# Patient Record
Sex: FEMALE | Race: BLACK OR AFRICAN AMERICAN | NOT HISPANIC OR LATINO
[De-identification: names, ages, dates, MRNs, and addresses within clinical notes are randomized per-mention and may not be internally consistent; named-entity substitution may affect disease eponyms.]

---

## 2015-06-03 RX ORDER — ATORVASTATIN CALCIUM 80 MG/1
1 TABLET, FILM COATED ORAL
Qty: 0 | Refills: 0 | COMMUNITY
Start: 2015-06-03

## 2015-11-05 RX ORDER — METFORMIN HYDROCHLORIDE 850 MG/1
0 TABLET ORAL
Qty: 0 | Refills: 0 | COMMUNITY
Start: 2015-11-05

## 2015-11-05 RX ORDER — ISOSORBIDE MONONITRATE 60 MG/1
1 TABLET, EXTENDED RELEASE ORAL
Qty: 0 | Refills: 0 | COMMUNITY
Start: 2015-11-05

## 2017-03-08 ENCOUNTER — APPOINTMENT (OUTPATIENT)
Dept: ORTHOPEDIC SURGERY | Facility: CLINIC | Age: 62
End: 2017-03-08

## 2017-04-13 ENCOUNTER — APPOINTMENT (OUTPATIENT)
Dept: CARDIOLOGY | Facility: CLINIC | Age: 62
End: 2017-04-13

## 2017-04-29 ENCOUNTER — APPOINTMENT (OUTPATIENT)
Dept: ORTHOPEDIC SURGERY | Facility: CLINIC | Age: 62
End: 2017-04-29

## 2017-04-29 VITALS
WEIGHT: 220 LBS | SYSTOLIC BLOOD PRESSURE: 151 MMHG | DIASTOLIC BLOOD PRESSURE: 78 MMHG | HEIGHT: 65 IN | BODY MASS INDEX: 36.65 KG/M2 | HEART RATE: 70 BPM

## 2017-04-29 DIAGNOSIS — M47.816 SPONDYLOSIS W/OUT MYELOPATHY OR RADICULOPATHY, LUMBAR REGION: ICD-10-CM

## 2017-04-29 DIAGNOSIS — I63.9 CEREBRAL INFARCTION, UNSPECIFIED: ICD-10-CM

## 2017-06-09 ENCOUNTER — APPOINTMENT (OUTPATIENT)
Dept: ORTHOPEDIC SURGERY | Facility: CLINIC | Age: 62
End: 2017-06-09

## 2017-06-23 ENCOUNTER — OTHER (OUTPATIENT)
Age: 62
End: 2017-06-23

## 2017-06-30 ENCOUNTER — APPOINTMENT (OUTPATIENT)
Dept: ORTHOPEDIC SURGERY | Facility: CLINIC | Age: 62
End: 2017-06-30

## 2017-06-30 VITALS
HEIGHT: 65 IN | WEIGHT: 220 LBS | BODY MASS INDEX: 36.65 KG/M2 | DIASTOLIC BLOOD PRESSURE: 93 MMHG | SYSTOLIC BLOOD PRESSURE: 183 MMHG | HEART RATE: 81 BPM

## 2017-06-30 DIAGNOSIS — M48.06 SPINAL STENOSIS, LUMBAR REGION: ICD-10-CM

## 2017-06-30 RX ORDER — ISOPROPYL ALCOHOL 0.7 ML/1
70 SWAB TOPICAL
Qty: 100 | Refills: 0 | Status: ACTIVE | COMMUNITY
Start: 2016-09-30

## 2017-07-23 ENCOUNTER — EMERGENCY (EMERGENCY)
Facility: HOSPITAL | Age: 62
LOS: 1 days | Discharge: HOME | End: 2017-07-23
Attending: EMERGENCY MEDICINE
Payer: COMMERCIAL

## 2017-07-23 VITALS
RESPIRATION RATE: 20 BRPM | OXYGEN SATURATION: 98 % | SYSTOLIC BLOOD PRESSURE: 180 MMHG | WEIGHT: 233.03 LBS | TEMPERATURE: 99 F | HEART RATE: 82 BPM | DIASTOLIC BLOOD PRESSURE: 95 MMHG

## 2017-07-23 PROCEDURE — 96372 THER/PROPH/DIAG INJ SC/IM: CPT

## 2017-07-23 PROCEDURE — 99284 EMERGENCY DEPT VISIT MOD MDM: CPT | Mod: 25

## 2017-07-23 PROCEDURE — 99284 EMERGENCY DEPT VISIT MOD MDM: CPT

## 2017-07-23 RX ORDER — HYDROMORPHONE HYDROCHLORIDE 2 MG/ML
1 INJECTION INTRAMUSCULAR; INTRAVENOUS; SUBCUTANEOUS ONCE
Qty: 0 | Refills: 0 | Status: DISCONTINUED | OUTPATIENT
Start: 2017-07-23 | End: 2017-07-23

## 2017-07-23 RX ADMIN — HYDROMORPHONE HYDROCHLORIDE 1 MILLIGRAM(S): 2 INJECTION INTRAMUSCULAR; INTRAVENOUS; SUBCUTANEOUS at 20:39

## 2017-07-23 NOTE — ED STATDOCS - ATTENDING CONTRIBUTION TO CARE
I, Alejandro Perera, performed the initial face to face bedside interview with this patient regarding history of present illness, review of symptoms and relevant past medical, social and family history.  I completed an independent physical examination.  I was the initial provider who evaluated this patient. I have signed out the follow up of any pending tests (i.e. labs, radiological studies) to the ACP.  I have communicated the patient’s plan of care and disposition with the ACP.

## 2017-07-23 NOTE — ED ADULT NURSE NOTE - OBJECTIVE STATEMENT
Pt axox3 c/o running out of her percocet and states she needs a referral from her pmd to go see her main mgt md. Pt presenting with / upper/lower back pain with radiation to her left leg. Pt ambulates with personal cane which is in her possession in the ED.

## 2017-07-23 NOTE — ED ADULT TRIAGE NOTE - CHIEF COMPLAINT QUOTE
lt side body pain x 2 years from neuropathy and pinched nerve. PMD aware and pt states he is not doing anything.

## 2017-07-23 NOTE — ED STATDOCS - PROGRESS NOTE DETAILS
PA NOTE: Pt seen by intake physician and HPI/ROS/PE/MDM reviewed. Discussed plan and any resulted studies at this time. Pt is feeling much better after pain medications. Pt admits to hx of chronic back pain/sciatica; for which she sees Dr. Colon. She ran out of pain medication and is in the ER today for pain control. She denies new falls, leg weakness, fever, chills, abd pain, urinary symptoms, paresthesias, saddle anesthesia, nausea, vomiting, sob, cp, hp, bb incontinence. MS: pt with left paraspinal lumbar pain and pain to buttock with palpation, negative straight leg raise, no midline tenderness, no step offs, from/strength/sensation of lower extremities; including dorsi flexion and planter flexion against resistance. Gait intact with cane. DP intact. Skin: no rash Plan: Pt feeling better after medications here. F/u with pain management. FS WNL.

## 2017-07-23 NOTE — ED STATDOCS - OBJECTIVE STATEMENT
63 yo F with hx of DM presents to ED c/o worsening left sided back pain radiating to left leg. Pt follows pain mgmt Dr. Colon for chronic back pain. No relief with oxycodone, Lyrica 100mg bid, and Tylenol. Pt ambulates with a cane. Increased pain with walking. Pt also complains "my toes feel like they are going to burst". Denies falls and trauma. BSL was 79 this morning.

## 2017-07-23 NOTE — ED ADULT NURSE NOTE - PMH
Diabetes    Glaucoma    Herniated disc, cervical    History of CEA (carotid endarterectomy)  Right  History of peripheral vascular disease    History of retinal detachment    Hyperlipidemia    Hypertension    Legally blind    PAD (peripheral artery disease)

## 2017-08-11 ENCOUNTER — APPOINTMENT (OUTPATIENT)
Dept: ORTHOPEDIC SURGERY | Facility: CLINIC | Age: 62
End: 2017-08-11

## 2017-08-29 ENCOUNTER — APPOINTMENT (OUTPATIENT)
Dept: CARDIOLOGY | Facility: CLINIC | Age: 62
End: 2017-08-29
Payer: MEDICARE

## 2017-08-29 VITALS — DIASTOLIC BLOOD PRESSURE: 86 MMHG | SYSTOLIC BLOOD PRESSURE: 162 MMHG

## 2017-08-29 VITALS
DIASTOLIC BLOOD PRESSURE: 82 MMHG | BODY MASS INDEX: 39.65 KG/M2 | HEIGHT: 65 IN | SYSTOLIC BLOOD PRESSURE: 155 MMHG | OXYGEN SATURATION: 96 % | WEIGHT: 238 LBS | HEART RATE: 84 BPM

## 2017-08-29 DIAGNOSIS — E11.40 TYPE 2 DIABETES MELLITUS WITH DIABETIC NEUROPATHY, UNSPECIFIED: ICD-10-CM

## 2017-08-29 DIAGNOSIS — M79.605 PAIN IN LEFT LEG: ICD-10-CM

## 2017-08-29 PROCEDURE — 93000 ELECTROCARDIOGRAM COMPLETE: CPT

## 2017-08-29 PROCEDURE — 99215 OFFICE O/P EST HI 40 MIN: CPT

## 2017-09-18 ENCOUNTER — FORM ENCOUNTER (OUTPATIENT)
Age: 62
End: 2017-09-18

## 2017-09-19 ENCOUNTER — OUTPATIENT (OUTPATIENT)
Dept: OUTPATIENT SERVICES | Facility: HOSPITAL | Age: 62
LOS: 1 days | End: 2017-09-19
Payer: COMMERCIAL

## 2017-09-19 DIAGNOSIS — I25.10 ATHEROSCLEROTIC HEART DISEASE OF NATIVE CORONARY ARTERY WITHOUT ANGINA PECTORIS: ICD-10-CM

## 2017-09-19 PROCEDURE — 93306 TTE W/DOPPLER COMPLETE: CPT | Mod: 26

## 2017-09-19 PROCEDURE — 93306 TTE W/DOPPLER COMPLETE: CPT

## 2017-10-18 ENCOUNTER — APPOINTMENT (OUTPATIENT)
Dept: MRI IMAGING | Facility: CLINIC | Age: 62
End: 2017-10-18
Payer: MEDICARE

## 2017-10-18 ENCOUNTER — OUTPATIENT (OUTPATIENT)
Dept: OUTPATIENT SERVICES | Facility: HOSPITAL | Age: 62
LOS: 1 days | End: 2017-10-18
Payer: COMMERCIAL

## 2017-10-18 DIAGNOSIS — M79.605 PAIN IN LEFT LEG: ICD-10-CM

## 2017-10-18 PROCEDURE — C8902: CPT

## 2017-10-18 PROCEDURE — C8914: CPT

## 2017-10-18 PROCEDURE — 82565 ASSAY OF CREATININE: CPT

## 2017-10-18 PROCEDURE — 74185 MRA ABD W OR W/O CNTRST: CPT | Mod: 26

## 2017-10-18 PROCEDURE — 73725 MR ANG LWR EXT W OR W/O DYE: CPT | Mod: 26,50

## 2017-10-18 PROCEDURE — A9585: CPT

## 2017-12-14 ENCOUNTER — APPOINTMENT (OUTPATIENT)
Dept: CARDIOLOGY | Facility: CLINIC | Age: 62
End: 2017-12-14

## 2018-08-20 ENCOUNTER — APPOINTMENT (OUTPATIENT)
Dept: INTERNAL MEDICINE | Facility: CLINIC | Age: 63
End: 2018-08-20

## 2018-11-20 ENCOUNTER — APPOINTMENT (OUTPATIENT)
Dept: CARDIOLOGY | Facility: CLINIC | Age: 63
End: 2018-11-20
Payer: MEDICARE

## 2018-11-20 ENCOUNTER — APPOINTMENT (OUTPATIENT)
Dept: CARDIOLOGY | Facility: CLINIC | Age: 63
End: 2018-11-20

## 2018-11-20 VITALS
RESPIRATION RATE: 16 BRPM | HEIGHT: 65 IN | WEIGHT: 232 LBS | OXYGEN SATURATION: 94 % | HEART RATE: 82 BPM | SYSTOLIC BLOOD PRESSURE: 160 MMHG | DIASTOLIC BLOOD PRESSURE: 91 MMHG | BODY MASS INDEX: 38.65 KG/M2

## 2018-11-20 VITALS — DIASTOLIC BLOOD PRESSURE: 70 MMHG | SYSTOLIC BLOOD PRESSURE: 130 MMHG

## 2018-11-20 DIAGNOSIS — R07.89 OTHER CHEST PAIN: ICD-10-CM

## 2018-11-20 PROCEDURE — 99214 OFFICE O/P EST MOD 30 MIN: CPT

## 2018-11-20 PROCEDURE — 93000 ELECTROCARDIOGRAM COMPLETE: CPT

## 2018-11-20 RX ORDER — PEN NEEDLE, DIABETIC 29 G X1/2"
31G X 8 MM NEEDLE, DISPOSABLE MISCELLANEOUS
Refills: 0 | Status: ACTIVE | COMMUNITY
Start: 2016-09-30

## 2018-11-20 RX ORDER — DIAZEPAM 5 MG/1
5 TABLET ORAL
Qty: 2 | Refills: 0 | Status: DISCONTINUED | COMMUNITY
Start: 2017-02-10 | End: 2018-11-20

## 2018-11-20 RX ORDER — GABAPENTIN 100 MG/1
100 CAPSULE ORAL TWICE DAILY
Refills: 0 | Status: ACTIVE | COMMUNITY
Start: 2018-11-20

## 2018-11-20 RX ORDER — PREGABALIN 100 MG/1
100 CAPSULE ORAL
Qty: 60 | Refills: 0 | Status: DISCONTINUED | COMMUNITY
Start: 2016-11-23 | End: 2018-11-20

## 2018-11-20 RX ORDER — TRIAMCINOLONE ACETONIDE 1 MG/ML
0.1 LOTION TOPICAL
Qty: 60 | Refills: 0 | Status: DISCONTINUED | COMMUNITY
Start: 2017-01-17 | End: 2018-11-20

## 2018-11-20 RX ORDER — INSULIN ASPART 100 [IU]/ML
(70-30) 100 INJECTION, SUSPENSION SUBCUTANEOUS
Refills: 0 | Status: ACTIVE | COMMUNITY
Start: 2017-01-23

## 2018-11-20 RX ORDER — BLOOD SUGAR DIAGNOSTIC
STRIP MISCELLANEOUS
Refills: 0 | Status: ACTIVE | COMMUNITY
Start: 2016-09-30

## 2018-11-25 ENCOUNTER — INPATIENT (INPATIENT)
Facility: HOSPITAL | Age: 63
LOS: 30 days | Discharge: HOME | DRG: 286 | End: 2018-12-26
Attending: HOSPITALIST | Admitting: HOSPITALIST
Payer: COMMERCIAL

## 2018-11-25 VITALS
TEMPERATURE: 98 F | RESPIRATION RATE: 22 BRPM | HEART RATE: 76 BPM | SYSTOLIC BLOOD PRESSURE: 174 MMHG | DIASTOLIC BLOOD PRESSURE: 108 MMHG | HEIGHT: 65 IN | WEIGHT: 214.95 LBS

## 2018-11-25 DIAGNOSIS — I50.33 ACUTE ON CHRONIC DIASTOLIC (CONGESTIVE) HEART FAILURE: ICD-10-CM

## 2018-11-25 DIAGNOSIS — E87.5 HYPERKALEMIA: ICD-10-CM

## 2018-11-25 DIAGNOSIS — I25.10 ATHEROSCLEROTIC HEART DISEASE OF NATIVE CORONARY ARTERY WITHOUT ANGINA PECTORIS: ICD-10-CM

## 2018-11-25 LAB
ALBUMIN SERPL ELPH-MCNC: 3.5 G/DL — SIGNIFICANT CHANGE UP (ref 3.3–5.2)
ALP SERPL-CCNC: 276 U/L — HIGH (ref 40–120)
ALT FLD-CCNC: 49 U/L — HIGH
ANION GAP SERPL CALC-SCNC: 6 MMOL/L — SIGNIFICANT CHANGE UP (ref 5–17)
APTT BLD: 28.9 SEC — SIGNIFICANT CHANGE UP (ref 27.5–36.3)
AST SERPL-CCNC: 41 U/L — HIGH
BILIRUB SERPL-MCNC: 0.9 MG/DL — SIGNIFICANT CHANGE UP (ref 0.4–2)
BUN SERPL-MCNC: 31 MG/DL — HIGH (ref 8–20)
CALCIUM SERPL-MCNC: 8.6 MG/DL — SIGNIFICANT CHANGE UP (ref 8.6–10.2)
CHLORIDE SERPL-SCNC: 96 MMOL/L — LOW (ref 98–107)
CO2 SERPL-SCNC: 30 MMOL/L — HIGH (ref 22–29)
CREAT SERPL-MCNC: 0.98 MG/DL — SIGNIFICANT CHANGE UP (ref 0.5–1.3)
GLUCOSE SERPL-MCNC: 173 MG/DL — HIGH (ref 70–115)
HCT VFR BLD CALC: 31.3 % — LOW (ref 37–47)
HGB BLD-MCNC: 10.7 G/DL — LOW (ref 12–16)
INR BLD: 1.22 RATIO — HIGH (ref 0.88–1.16)
MAGNESIUM SERPL-MCNC: 2.3 MG/DL — SIGNIFICANT CHANGE UP (ref 1.6–2.6)
MCHC RBC-ENTMCNC: 28.2 PG — SIGNIFICANT CHANGE UP (ref 27–31)
MCHC RBC-ENTMCNC: 34.2 G/DL — SIGNIFICANT CHANGE UP (ref 32–36)
MCV RBC AUTO: 82.6 FL — SIGNIFICANT CHANGE UP (ref 81–99)
NT-PROBNP SERPL-SCNC: 3460 PG/ML — HIGH (ref 0–300)
PLATELET # BLD AUTO: 237 K/UL — SIGNIFICANT CHANGE UP (ref 150–400)
POTASSIUM SERPL-MCNC: 5.7 MMOL/L — HIGH (ref 3.5–5.3)
POTASSIUM SERPL-SCNC: 5.7 MMOL/L — HIGH (ref 3.5–5.3)
PROT SERPL-MCNC: 7.8 G/DL — SIGNIFICANT CHANGE UP (ref 6.6–8.7)
PROTHROM AB SERPL-ACNC: 14.1 SEC — HIGH (ref 10–12.9)
RBC # BLD: 3.79 M/UL — LOW (ref 4.4–5.2)
RBC # FLD: 20.4 % — HIGH (ref 11–15.6)
SODIUM SERPL-SCNC: 132 MMOL/L — LOW (ref 135–145)
TROPONIN T SERPL-MCNC: 0.04 NG/ML — SIGNIFICANT CHANGE UP (ref 0–0.06)
WBC # BLD: 7.4 K/UL — SIGNIFICANT CHANGE UP (ref 4.8–10.8)
WBC # FLD AUTO: 7.4 K/UL — SIGNIFICANT CHANGE UP (ref 4.8–10.8)

## 2018-11-25 PROCEDURE — 99285 EMERGENCY DEPT VISIT HI MDM: CPT

## 2018-11-25 PROCEDURE — 99223 1ST HOSP IP/OBS HIGH 75: CPT

## 2018-11-25 PROCEDURE — 71046 X-RAY EXAM CHEST 2 VIEWS: CPT | Mod: 26

## 2018-11-25 PROCEDURE — 74176 CT ABD & PELVIS W/O CONTRAST: CPT | Mod: 26

## 2018-11-25 PROCEDURE — 93010 ELECTROCARDIOGRAM REPORT: CPT

## 2018-11-25 RX ORDER — NITROGLYCERIN 6.5 MG
1 CAPSULE, EXTENDED RELEASE ORAL DAILY
Qty: 0 | Refills: 0 | Status: DISCONTINUED | OUTPATIENT
Start: 2018-11-25 | End: 2018-12-10

## 2018-11-25 RX ORDER — HYDROCHLOROTHIAZIDE 25 MG
12.5 TABLET ORAL ONCE
Qty: 0 | Refills: 0 | Status: COMPLETED | OUTPATIENT
Start: 2018-11-25 | End: 2018-11-25

## 2018-11-25 RX ORDER — ISOSORBIDE MONONITRATE 60 MG/1
60 TABLET, EXTENDED RELEASE ORAL ONCE
Qty: 0 | Refills: 0 | Status: COMPLETED | OUTPATIENT
Start: 2018-11-25 | End: 2018-11-25

## 2018-11-25 RX ORDER — FUROSEMIDE 40 MG
40 TABLET ORAL ONCE
Qty: 0 | Refills: 0 | Status: COMPLETED | OUTPATIENT
Start: 2018-11-25 | End: 2018-11-25

## 2018-11-25 RX ORDER — KETOROLAC TROMETHAMINE 30 MG/ML
15 SYRINGE (ML) INJECTION ONCE
Qty: 0 | Refills: 0 | Status: DISCONTINUED | OUTPATIENT
Start: 2018-11-25 | End: 2018-11-25

## 2018-11-25 RX ORDER — CARVEDILOL PHOSPHATE 80 MG/1
25 CAPSULE, EXTENDED RELEASE ORAL ONCE
Qty: 0 | Refills: 0 | Status: COMPLETED | OUTPATIENT
Start: 2018-11-25 | End: 2018-11-25

## 2018-11-25 RX ADMIN — CARVEDILOL PHOSPHATE 25 MILLIGRAM(S): 80 CAPSULE, EXTENDED RELEASE ORAL at 22:07

## 2018-11-25 RX ADMIN — Medication 15 MILLIGRAM(S): at 22:51

## 2018-11-25 RX ADMIN — ISOSORBIDE MONONITRATE 60 MILLIGRAM(S): 60 TABLET, EXTENDED RELEASE ORAL at 22:07

## 2018-11-25 RX ADMIN — Medication 40 MILLIGRAM(S): at 19:32

## 2018-11-25 RX ADMIN — Medication 12.5 MILLIGRAM(S): at 22:07

## 2018-11-25 RX ADMIN — Medication 1 PATCH: at 19:33

## 2018-11-25 RX ADMIN — Medication 15 MILLIGRAM(S): at 21:04

## 2018-11-25 NOTE — ED PROVIDER NOTE - PSH
After cataract    Atherosclerosis of right carotid artery     delivery delivered    Detached retina    S/P CABG x 1    Uveitic glaucoma of both eyes

## 2018-11-25 NOTE — ED PROVIDER NOTE - PHYSICAL EXAMINATION
VITAL SIGNS: I have reviewed nursing notes and confirm.  CONSTITUTIONAL: Well-developed; well-nourished; in mild respiratory  distress.  SKIN: Skin exam is warm and dry, no acute rash.  HEAD: Normocephalic; atraumatic.  EYES: PERRL, EOM intact; conjunctiva and sclera clear.  ENT: No nasal discharge; airway clear. Throat clear.  NECK: Supple; non tender.  No lymphadenopathy.  CARD: S1, S2 normal; no murmurs, gallops, or rubs. Regular rate and rhythm.  RESP: No wheezes,  (+) b/l crakle of base R>L  ABD: Normal bowel sounds;(+) distended  non-tender; no hepatosplenomegaly.  EXT: Normal ROM. No clubbing, cyanosis or edema.  NEURO: Alert, oriented. Grossly unremarkable. No focal deficits. no facial droop, moves all extremities,    PSYCH: Cooperative, appropriate.

## 2018-11-25 NOTE — ED PROVIDER NOTE - OBJECTIVE STATEMENT
62 yo F hx of CAD s/p CABG, DM, HDL, vascular insufficiency, HTN p/w exertional dyspnea and abdominal swelling, worse over the past 2 weeks, Patient has been complaints with her medication. No fever, no cough, no chest pain. She denies paracentisis of the abdomen.     cards: Dr. Gramajo ([facility])

## 2018-11-25 NOTE — ED ADULT TRIAGE NOTE - CHIEF COMPLAINT QUOTE
pt BIBA with reports of SOB x 2 weeks, states "I am retaining water." pt with no chest pain, AOX3, no obvious resp distress.

## 2018-11-25 NOTE — ED ADULT NURSE NOTE - NSIMPLEMENTINTERV_GEN_ALL_ED
Implemented All Fall Risk Interventions:  [place] to call system. Call bell, personal items and telephone within reach. Instruct patient to call for assistance. Room bathroom lighting operational. Non-slip footwear when patient is off stretcher. Physically safe environment: no spills, clutter or unnecessary equipment. Stretcher in lowest position, wheels locked, appropriate side rails in place. Provide visual cue, wrist band, yellow gown, etc. Monitor gait and stability. Monitor for mental status changes and reorient to person, place, and time. Review medications for side effects contributing to fall risk. Reinforce activity limits and safety measures with patient and family.

## 2018-11-25 NOTE — CONSULT NOTE ADULT - ATTENDING COMMENTS
Unclear etiology for CHF. ? of diabetic, diastolic dysfunction.     Aggressive diuresis needed. 40 mg IV bid is appropriate.     Plan for TTE and nuclear stress test.

## 2018-11-25 NOTE — ED ADULT NURSE NOTE - OBJECTIVE STATEMENT
pt c.o shortness of breath and left flank pain x2 weeks. pt saw primary physician, had blood work but nothing else was done. patient is a&ox3, no apparent respiratory distress, denies chest pain. pt's abdomen is swollen and nontender with the exception of her left flank.  pt reports decreased urination. normal sinus on monitor, hypertensive in 200s, administered nitro patch as ordered.

## 2018-11-25 NOTE — H&P ADULT - ASSESSMENT
64 y/o female with PMHx of CAD s/p CABG x1 Stent placement x2,  PAD s/p balloon angioplasty, DM2, HTN, HLD came to the ED complaining of worsening dyspnea with minimal exertion and increasing abdominal girth over the past 2 weeks. She usually ambulate with walker since R foot heel & 3 toes amputation last December without difficulty but lately has been having difficulty ambulating due to shortness of breath. She also reported generalized pain in her abdomen radiating to the left flank due to swelling in her abdomen. She has no chest pain, palpitation, orthopnea, cough, fever, chills, change in bowel/urinary habit, nausea, vomiting, recent travel, calf pain. Patient endorsed compliance to medication and diet.     Shortness of breath likely due to CHF exacerbation   Admit to telemetry   BNP: 3460   Troponin: 0.04   Lasix 40mg once given in the ED; will continue Lasix 40mg BID  Cardiology on board; recommendation noted   Will continue home medications     Hyperkalemia   K: 5.7 no hemolysis   Kayexalate 15mg once   Monitor BMP     CAD s/p CABG and stent   Continue Plavix 75mg and Aspirin 325mg     PAD s/p balloon angioplasty  Continue Plavix 75mg and Aspirin 325mg     HTN/HLD  Continue home medications   HbA1C and lipid profile in AM     DM-2  On metformin 850mg BID will hold   Insulin sliding scale     Supportive  DVT prophylaxis: Lovenox 40mg daily  GI prophylaxis: PPI 40mg home dose  Diet: DASH/TLC

## 2018-11-25 NOTE — ED PROVIDER NOTE - NS ED ROS FT
Review of Systems  •	CONSTITUTIONAL - no  fever, no diaphoresis, no weight change  •	SKIN - no rash  •	HEMATOLOGIC - no bleeding, no bruising  •	EYES - no eye pain, no blurred vision  •	ENT - no change in hearing, no pain  •	RESPIRATORY -  (+)  shortness of breath, no cough  •	CARDIAC - no chest pain, no palpitations  •	GI -(+) abd pain, no nausea, no vomiting, no diarrhea, no constipation, no bleeding  •	GENITO-URINARY - no discharge, no dysuria; no hematuria,   •	ENDO - no polydypsia, no polyurea, no heat/no cold intolerance  •	MUSCULOSKELETAL - no joint pain, no swelling, no redness  •	NEUROLOGIC - no weakness, no headache, no anesthesia, no paresthesias  •	PSYCH - no anxiety, non suicidal, non homicidal, no hallucination, no depression

## 2018-11-25 NOTE — H&P ADULT - FAMILY HISTORY
Father  Still living? No  Family history of essential hypertension, Age at diagnosis: Age Unknown  Family history of diabetes mellitus, Age at diagnosis: Age Unknown     Mother  Still living? No  Family history of essential hypertension, Age at diagnosis: Age Unknown  Family history of diabetes mellitus, Age at diagnosis: Age Unknown

## 2018-11-25 NOTE — H&P ADULT - HISTORY OF PRESENT ILLNESS
62 y/o female with PMHx of CAD s/p CABG x1 Stent placement x2,  PAD s/p balloon angioplasty, DM2, HTN, HLD came to the ED complaining of worsening dyspnea with minimal exertion and increasing abdominal girth over the past 2 weeks. She usually ambulate with walker since R foot heel & 3 toes amputation last December without difficulty but lately has been having difficulty ambulating due to shortness of breath. She also reported generalized pain in her abdomen radiating to the left flank due to swelling in her abdomen. She has no chest pain, palpitation, orthopnea, cough, fever, chills, change in bowel/urinary habit, nausea, vomiting, recent travel, calf pain. Patient endorsed compliance to medication and diet.

## 2018-11-26 DIAGNOSIS — I50.9 HEART FAILURE, UNSPECIFIED: ICD-10-CM

## 2018-11-26 LAB
ANION GAP SERPL CALC-SCNC: 6 MMOL/L — SIGNIFICANT CHANGE UP (ref 5–17)
BUN SERPL-MCNC: 32 MG/DL — HIGH (ref 8–20)
CALCIUM SERPL-MCNC: 8.6 MG/DL — SIGNIFICANT CHANGE UP (ref 8.6–10.2)
CHLORIDE SERPL-SCNC: 100 MMOL/L — SIGNIFICANT CHANGE UP (ref 98–107)
CHOLEST SERPL-MCNC: 116 MG/DL — SIGNIFICANT CHANGE UP (ref 110–199)
CO2 SERPL-SCNC: 31 MMOL/L — HIGH (ref 22–29)
CREAT SERPL-MCNC: 0.98 MG/DL — SIGNIFICANT CHANGE UP (ref 0.5–1.3)
GLUCOSE BLDC GLUCOMTR-MCNC: 111 MG/DL — HIGH (ref 70–99)
GLUCOSE BLDC GLUCOMTR-MCNC: 116 MG/DL — HIGH (ref 70–99)
GLUCOSE BLDC GLUCOMTR-MCNC: 226 MG/DL — HIGH (ref 70–99)
GLUCOSE BLDC GLUCOMTR-MCNC: 243 MG/DL — HIGH (ref 70–99)
GLUCOSE SERPL-MCNC: 110 MG/DL — SIGNIFICANT CHANGE UP (ref 70–115)
HBA1C BLD-MCNC: 7.3 % — HIGH (ref 4–5.6)
HDLC SERPL-MCNC: 38 MG/DL — LOW
LIPID PNL WITH DIRECT LDL SERPL: 65 MG/DL — SIGNIFICANT CHANGE UP
MAGNESIUM SERPL-MCNC: 2.1 MG/DL — SIGNIFICANT CHANGE UP (ref 1.6–2.6)
PHOSPHATE SERPL-MCNC: 4.3 MG/DL — SIGNIFICANT CHANGE UP (ref 2.4–4.7)
POTASSIUM SERPL-MCNC: 5.3 MMOL/L — SIGNIFICANT CHANGE UP (ref 3.5–5.3)
POTASSIUM SERPL-SCNC: 5.3 MMOL/L — SIGNIFICANT CHANGE UP (ref 3.5–5.3)
SODIUM SERPL-SCNC: 137 MMOL/L — SIGNIFICANT CHANGE UP (ref 135–145)
TOTAL CHOLESTEROL/HDL RATIO MEASUREMENT: 3 RATIO — LOW (ref 3.3–7.1)
TRIGL SERPL-MCNC: 65 MG/DL — SIGNIFICANT CHANGE UP (ref 10–200)
TROPONIN T SERPL-MCNC: 0.06 NG/ML — SIGNIFICANT CHANGE UP (ref 0–0.06)

## 2018-11-26 PROCEDURE — 99233 SBSQ HOSP IP/OBS HIGH 50: CPT

## 2018-11-26 PROCEDURE — 99223 1ST HOSP IP/OBS HIGH 75: CPT

## 2018-11-26 RX ORDER — ASPIRIN/CALCIUM CARB/MAGNESIUM 324 MG
325 TABLET ORAL DAILY
Qty: 0 | Refills: 0 | Status: DISCONTINUED | OUTPATIENT
Start: 2018-11-26 | End: 2018-11-30

## 2018-11-26 RX ORDER — DEXTROSE 50 % IN WATER 50 %
25 SYRINGE (ML) INTRAVENOUS ONCE
Qty: 0 | Refills: 0 | Status: DISCONTINUED | OUTPATIENT
Start: 2018-11-26 | End: 2018-12-26

## 2018-11-26 RX ORDER — KETOROLAC TROMETHAMINE 30 MG/ML
15 SYRINGE (ML) INJECTION ONCE
Qty: 0 | Refills: 0 | Status: DISCONTINUED | OUTPATIENT
Start: 2018-11-26 | End: 2018-11-26

## 2018-11-26 RX ORDER — OXYCODONE HYDROCHLORIDE 5 MG/1
5 TABLET ORAL ONCE
Qty: 0 | Refills: 0 | Status: DISCONTINUED | OUTPATIENT
Start: 2018-11-26 | End: 2018-11-26

## 2018-11-26 RX ORDER — ATORVASTATIN CALCIUM 80 MG/1
80 TABLET, FILM COATED ORAL AT BEDTIME
Qty: 0 | Refills: 0 | Status: DISCONTINUED | OUTPATIENT
Start: 2018-11-26 | End: 2018-12-10

## 2018-11-26 RX ORDER — ISOSORBIDE MONONITRATE 60 MG/1
60 TABLET, EXTENDED RELEASE ORAL DAILY
Qty: 0 | Refills: 0 | Status: DISCONTINUED | OUTPATIENT
Start: 2018-11-26 | End: 2018-12-26

## 2018-11-26 RX ORDER — ACETAMINOPHEN 500 MG
650 TABLET ORAL EVERY 6 HOURS
Qty: 0 | Refills: 0 | Status: DISCONTINUED | OUTPATIENT
Start: 2018-11-26 | End: 2018-11-26

## 2018-11-26 RX ORDER — SODIUM POLYSTYRENE SULFONATE 4.1 MEQ/G
15 POWDER, FOR SUSPENSION ORAL ONCE
Qty: 0 | Refills: 0 | Status: COMPLETED | OUTPATIENT
Start: 2018-11-26 | End: 2018-11-26

## 2018-11-26 RX ORDER — DEXTROSE 50 % IN WATER 50 %
12.5 SYRINGE (ML) INTRAVENOUS ONCE
Qty: 0 | Refills: 0 | Status: DISCONTINUED | OUTPATIENT
Start: 2018-11-26 | End: 2018-12-26

## 2018-11-26 RX ORDER — GABAPENTIN 400 MG/1
100 CAPSULE ORAL THREE TIMES A DAY
Qty: 0 | Refills: 0 | Status: DISCONTINUED | OUTPATIENT
Start: 2018-11-26 | End: 2018-12-26

## 2018-11-26 RX ORDER — CARVEDILOL PHOSPHATE 80 MG/1
25 CAPSULE, EXTENDED RELEASE ORAL EVERY 12 HOURS
Qty: 0 | Refills: 0 | Status: DISCONTINUED | OUTPATIENT
Start: 2018-11-26 | End: 2018-12-26

## 2018-11-26 RX ORDER — FUROSEMIDE 40 MG
40 TABLET ORAL EVERY 12 HOURS
Qty: 0 | Refills: 0 | Status: DISCONTINUED | OUTPATIENT
Start: 2018-11-26 | End: 2018-11-28

## 2018-11-26 RX ORDER — INSULIN LISPRO 100/ML
VIAL (ML) SUBCUTANEOUS
Qty: 0 | Refills: 0 | Status: DISCONTINUED | OUTPATIENT
Start: 2018-11-26 | End: 2018-12-26

## 2018-11-26 RX ORDER — SODIUM CHLORIDE 9 MG/ML
1000 INJECTION, SOLUTION INTRAVENOUS
Qty: 0 | Refills: 0 | Status: DISCONTINUED | OUTPATIENT
Start: 2018-11-26 | End: 2018-12-26

## 2018-11-26 RX ORDER — DEXTROSE 50 % IN WATER 50 %
15 SYRINGE (ML) INTRAVENOUS ONCE
Qty: 0 | Refills: 0 | Status: DISCONTINUED | OUTPATIENT
Start: 2018-11-26 | End: 2018-12-26

## 2018-11-26 RX ORDER — PANTOPRAZOLE SODIUM 20 MG/1
40 TABLET, DELAYED RELEASE ORAL
Qty: 0 | Refills: 0 | Status: DISCONTINUED | OUTPATIENT
Start: 2018-11-26 | End: 2018-12-26

## 2018-11-26 RX ORDER — DULOXETINE HYDROCHLORIDE 30 MG/1
60 CAPSULE, DELAYED RELEASE ORAL DAILY
Qty: 0 | Refills: 0 | Status: DISCONTINUED | OUTPATIENT
Start: 2018-11-26 | End: 2018-12-26

## 2018-11-26 RX ORDER — INFLUENZA VIRUS VACCINE 15; 15; 15; 15 UG/.5ML; UG/.5ML; UG/.5ML; UG/.5ML
0.5 SUSPENSION INTRAMUSCULAR ONCE
Qty: 0 | Refills: 0 | Status: COMPLETED | OUTPATIENT
Start: 2018-11-26 | End: 2018-11-27

## 2018-11-26 RX ORDER — ACETAMINOPHEN 500 MG
650 TABLET ORAL EVERY 6 HOURS
Qty: 0 | Refills: 0 | Status: DISCONTINUED | OUTPATIENT
Start: 2018-11-26 | End: 2018-12-26

## 2018-11-26 RX ORDER — ENOXAPARIN SODIUM 100 MG/ML
40 INJECTION SUBCUTANEOUS DAILY
Qty: 0 | Refills: 0 | Status: DISCONTINUED | OUTPATIENT
Start: 2018-11-26 | End: 2018-12-06

## 2018-11-26 RX ORDER — GLUCAGON INJECTION, SOLUTION 0.5 MG/.1ML
1 INJECTION, SOLUTION SUBCUTANEOUS ONCE
Qty: 0 | Refills: 0 | Status: DISCONTINUED | OUTPATIENT
Start: 2018-11-26 | End: 2018-12-26

## 2018-11-26 RX ORDER — LOSARTAN POTASSIUM 100 MG/1
100 TABLET, FILM COATED ORAL DAILY
Qty: 0 | Refills: 0 | Status: DISCONTINUED | OUTPATIENT
Start: 2018-11-26 | End: 2018-11-28

## 2018-11-26 RX ORDER — HYDROCHLOROTHIAZIDE 25 MG
12.5 TABLET ORAL DAILY
Qty: 0 | Refills: 0 | Status: DISCONTINUED | OUTPATIENT
Start: 2018-11-26 | End: 2018-11-29

## 2018-11-26 RX ORDER — DOCUSATE SODIUM 100 MG
100 CAPSULE ORAL
Qty: 0 | Refills: 0 | Status: DISCONTINUED | OUTPATIENT
Start: 2018-11-26 | End: 2018-12-26

## 2018-11-26 RX ORDER — OXYCODONE HYDROCHLORIDE 5 MG/1
5 TABLET ORAL EVERY 6 HOURS
Qty: 0 | Refills: 0 | Status: DISCONTINUED | OUTPATIENT
Start: 2018-11-26 | End: 2018-12-02

## 2018-11-26 RX ORDER — CLOPIDOGREL BISULFATE 75 MG/1
75 TABLET, FILM COATED ORAL DAILY
Qty: 0 | Refills: 0 | Status: DISCONTINUED | OUTPATIENT
Start: 2018-11-26 | End: 2018-12-26

## 2018-11-26 RX ADMIN — ENOXAPARIN SODIUM 40 MILLIGRAM(S): 100 INJECTION SUBCUTANEOUS at 11:43

## 2018-11-26 RX ADMIN — Medication 1 PATCH: at 08:00

## 2018-11-26 RX ADMIN — GABAPENTIN 100 MILLIGRAM(S): 400 CAPSULE ORAL at 21:19

## 2018-11-26 RX ADMIN — Medication 2: at 17:25

## 2018-11-26 RX ADMIN — Medication 1 PATCH: at 15:03

## 2018-11-26 RX ADMIN — CLOPIDOGREL BISULFATE 75 MILLIGRAM(S): 75 TABLET, FILM COATED ORAL at 11:43

## 2018-11-26 RX ADMIN — Medication 15 MILLIGRAM(S): at 05:47

## 2018-11-26 RX ADMIN — PANTOPRAZOLE SODIUM 40 MILLIGRAM(S): 20 TABLET, DELAYED RELEASE ORAL at 05:11

## 2018-11-26 RX ADMIN — ISOSORBIDE MONONITRATE 60 MILLIGRAM(S): 60 TABLET, EXTENDED RELEASE ORAL at 11:43

## 2018-11-26 RX ADMIN — OXYCODONE HYDROCHLORIDE 5 MILLIGRAM(S): 5 TABLET ORAL at 12:32

## 2018-11-26 RX ADMIN — Medication 325 MILLIGRAM(S): at 11:43

## 2018-11-26 RX ADMIN — GABAPENTIN 100 MILLIGRAM(S): 400 CAPSULE ORAL at 15:03

## 2018-11-26 RX ADMIN — DULOXETINE HYDROCHLORIDE 60 MILLIGRAM(S): 30 CAPSULE, DELAYED RELEASE ORAL at 11:43

## 2018-11-26 RX ADMIN — Medication 40 MILLIGRAM(S): at 05:11

## 2018-11-26 RX ADMIN — Medication 15 MILLIGRAM(S): at 05:17

## 2018-11-26 RX ADMIN — Medication 40 MILLIGRAM(S): at 18:48

## 2018-11-26 RX ADMIN — Medication 12.5 MILLIGRAM(S): at 05:11

## 2018-11-26 RX ADMIN — CARVEDILOL PHOSPHATE 25 MILLIGRAM(S): 80 CAPSULE, EXTENDED RELEASE ORAL at 18:48

## 2018-11-26 RX ADMIN — SODIUM POLYSTYRENE SULFONATE 15 GRAM(S): 4.1 POWDER, FOR SUSPENSION ORAL at 03:54

## 2018-11-26 RX ADMIN — ATORVASTATIN CALCIUM 80 MILLIGRAM(S): 80 TABLET, FILM COATED ORAL at 21:19

## 2018-11-26 RX ADMIN — Medication 1 PATCH: at 07:00

## 2018-11-26 RX ADMIN — GABAPENTIN 100 MILLIGRAM(S): 400 CAPSULE ORAL at 05:10

## 2018-11-26 RX ADMIN — LOSARTAN POTASSIUM 100 MILLIGRAM(S): 100 TABLET, FILM COATED ORAL at 05:11

## 2018-11-26 RX ADMIN — CARVEDILOL PHOSPHATE 25 MILLIGRAM(S): 80 CAPSULE, EXTENDED RELEASE ORAL at 05:11

## 2018-11-26 RX ADMIN — OXYCODONE HYDROCHLORIDE 5 MILLIGRAM(S): 5 TABLET ORAL at 13:32

## 2018-11-26 NOTE — PROGRESS NOTE ADULT - SUBJECTIVE AND OBJECTIVE BOX
JOSEFINA KUN Female 63y [id number]    Patient is a 63y old  Female who presents with a chief complaint of CHF (25 Nov 2018 23:06)      On Service note:  Pt seen/ examined at bedside and charts reviewed, no over night event reported by night staff. Pt reports she came to hospital due to fluid overload- now feeling little better, does reports some lower back pain.     Review of system:  No fever, chills, nausea, vomiting, headache, dizziness.    PHYSICAL EXAM:    Vital Signs Last 24 Hrs  T(C): 36.5 (26 Nov 2018 05:08), Max: 36.7 (25 Nov 2018 18:12)  T(F): 97.7 (26 Nov 2018 05:08), Max: 98 (25 Nov 2018 18:12)  HR: 65 (26 Nov 2018 09:38) (61 - 76)  BP: 139/65 (26 Nov 2018 09:38) (116/54 - 210/85)  BP(mean): --  RR: 18 (26 Nov 2018 05:08) (18 - 22)  SpO2: 100% (26 Nov 2018 05:08) (87% - 100%)    GENERAL: Pt lying comfortably, NAD.  CHEST/LUNG: Clear to auscultation bilaterally; No wheezing.  HEART: S1S2+, Regular rate and rhythm; No murmurs.  ABDOMEN: Soft, Nontender, Nondistended; Bowel sounds present.  Extremities: No pedal edema, right toe amputation.   NEURO: AAOX3, no focal deficits, no motor r sensory loss.  PSYCH: normal mood.          MEDICATIONS  (STANDING):  aspirin 325 milliGRAM(s) Oral daily  atorvastatin 80 milliGRAM(s) Oral at bedtime  carvedilol 25 milliGRAM(s) Oral every 12 hours  clopidogrel Tablet 75 milliGRAM(s) Oral daily  dextrose 5%. 1000 milliLiter(s) (50 mL/Hr) IV Continuous <Continuous>  dextrose 50% Injectable 12.5 Gram(s) IV Push once  dextrose 50% Injectable 25 Gram(s) IV Push once  dextrose 50% Injectable 25 Gram(s) IV Push once  docusate sodium 100 milliGRAM(s) Oral two times a day  DULoxetine 60 milliGRAM(s) Oral daily  enoxaparin Injectable 40 milliGRAM(s) SubCutaneous daily  furosemide   Injectable 40 milliGRAM(s) IV Push every 12 hours  gabapentin 100 milliGRAM(s) Oral three times a day  hydrochlorothiazide 12.5 milliGRAM(s) Oral daily  influenza   Vaccine 0.5 milliLiter(s) IntraMuscular once  insulin lispro (HumaLOG) corrective regimen sliding scale   SubCutaneous three times a day before meals  isosorbide   mononitrate ER Tablet (IMDUR) 60 milliGRAM(s) Oral daily  losartan 100 milliGRAM(s) Oral daily  nitroglycerin    Patch 0.4 mG/Hr(s) 1 patch Transdermal daily  pantoprazole    Tablet 40 milliGRAM(s) Oral before breakfast    MEDICATIONS  (PRN):  acetaminophen   Tablet .. 650 milliGRAM(s) Oral every 6 hours PRN Moderate Pain (4 - 6)  dextrose 40% Gel 15 Gram(s) Oral once PRN Blood Glucose LESS THAN 70 milliGRAM(s)/deciliter  glucagon  Injectable 1 milliGRAM(s) IntraMuscular once PRN Glucose LESS THAN 70 milligrams/deciliter        Labs:  LABS:                        10.7   7.4   )-----------( 237      ( 25 Nov 2018 19:39 )             31.3     11-26    137  |  100  |  32.0<H>  ----------------------------<  110  5.3   |  31.0<H>  |  0.98    Ca    8.6      26 Nov 2018 11:32  Phos  4.3     11-26  Mg     2.1     11-26    TPro  7.8  /  Alb  3.5  /  TBili  0.9  /  DBili  x   /  AST  41<H>  /  ALT  49<H>  /  AlkPhos  276<H>  11-25    PT/INR - ( 25 Nov 2018 19:39 )   PT: 14.1 sec;   INR: 1.22 ratio         PTT - ( 25 Nov 2018 19:39 )  PTT:28.9 sec    LIVER FUNCTIONS - ( 25 Nov 2018 19:39 )  Alb: 3.5 g/dL / Pro: 7.8 g/dL / ALK PHOS: 276 U/L / ALT: 49 U/L / AST: 41 U/L / GGT: x               CARDIAC MARKERS ( 26 Nov 2018 11:32 )  x     / 0.06 ng/mL / x     / x     / x      CARDIAC MARKERS ( 25 Nov 2018 19:39 )  x     / 0.04 ng/mL / x     / x     / x

## 2018-11-26 NOTE — PROGRESS NOTE ADULT - ASSESSMENT
Pt is 62 y/o female with PMHx of CAD s/p CABG x1, Stent placement x2,  PAD s/p balloon angioplasty, DM2, HTN, HLD came to the ED with c/o  worsening dyspnea with minimal exertion, increasing abdominal girth and limited ambulation due to SOB. Found to have elevated proBNP and congestion on CXR- seen by cardiology in ED and admitted to medicine for acute on chronic HFpEF.       Shortness of breath likely due to HFpEF:   - Clinically feeling better.   - C/w CHF pathway, I/Os, daily weight.   - C/w IV lasix and other home meds.  - Cardiology on board; recommendation noted   - TTE pending.      >Hyperkalemia- Resolved.   >H/o CAD s/p CABG and stent- Stable, no CP, C/w ASA/ Plavix/ Statins/ Coreg. Cardio on board- nuclear stress test once volume status better.  >H/o PAD- s/p balloon angioplasty, C/w ASA/ Plavix/ Lipitor.   >H/o HTN /HLD- Continue home medications   >H/o DM-2 - C/w LSS + FS monitoring. Hold home metformin.   >Chronic lower backache- Pain meds prn.  >DVT ppx- Lovenox.

## 2018-11-27 ENCOUNTER — NON-APPOINTMENT (OUTPATIENT)
Age: 63
End: 2018-11-27

## 2018-11-27 DIAGNOSIS — I25.10 ATHEROSCLEROTIC HEART DISEASE OF NATIVE CORONARY ARTERY WITHOUT ANGINA PECTORIS: ICD-10-CM

## 2018-11-27 LAB
GLUCOSE BLDC GLUCOMTR-MCNC: 199 MG/DL — HIGH (ref 70–99)
GLUCOSE BLDC GLUCOMTR-MCNC: 212 MG/DL — HIGH (ref 70–99)
GLUCOSE BLDC GLUCOMTR-MCNC: 232 MG/DL — HIGH (ref 70–99)
HCT VFR BLD CALC: 27.9 % — LOW (ref 37–47)
HGB BLD-MCNC: 9.6 G/DL — LOW (ref 12–16)
MCHC RBC-ENTMCNC: 28.5 PG — SIGNIFICANT CHANGE UP (ref 27–31)
MCHC RBC-ENTMCNC: 34.4 G/DL — SIGNIFICANT CHANGE UP (ref 32–36)
MCV RBC AUTO: 82.8 FL — SIGNIFICANT CHANGE UP (ref 81–99)
PLATELET # BLD AUTO: 208 K/UL — SIGNIFICANT CHANGE UP (ref 150–400)
RBC # BLD: 3.37 M/UL — LOW (ref 4.4–5.2)
RBC # FLD: 19.9 % — HIGH (ref 11–15.6)
WBC # BLD: 4.3 K/UL — LOW (ref 4.8–10.8)
WBC # FLD AUTO: 4.3 K/UL — LOW (ref 4.8–10.8)

## 2018-11-27 PROCEDURE — 93016 CV STRESS TEST SUPVJ ONLY: CPT

## 2018-11-27 PROCEDURE — 78452 HT MUSCLE IMAGE SPECT MULT: CPT | Mod: 26

## 2018-11-27 PROCEDURE — 99232 SBSQ HOSP IP/OBS MODERATE 35: CPT

## 2018-11-27 PROCEDURE — 99233 SBSQ HOSP IP/OBS HIGH 50: CPT

## 2018-11-27 PROCEDURE — 93018 CV STRESS TEST I&R ONLY: CPT

## 2018-11-27 RX ORDER — INSULIN GLARGINE 100 [IU]/ML
7 INJECTION, SOLUTION SUBCUTANEOUS AT BEDTIME
Qty: 0 | Refills: 0 | Status: DISCONTINUED | OUTPATIENT
Start: 2018-11-27 | End: 2018-12-26

## 2018-11-27 RX ADMIN — Medication 325 MILLIGRAM(S): at 11:15

## 2018-11-27 RX ADMIN — GABAPENTIN 100 MILLIGRAM(S): 400 CAPSULE ORAL at 14:30

## 2018-11-27 RX ADMIN — Medication 1 PATCH: at 22:01

## 2018-11-27 RX ADMIN — INFLUENZA VIRUS VACCINE 0.5 MILLILITER(S): 15; 15; 15; 15 SUSPENSION INTRAMUSCULAR at 11:41

## 2018-11-27 RX ADMIN — DULOXETINE HYDROCHLORIDE 60 MILLIGRAM(S): 30 CAPSULE, DELAYED RELEASE ORAL at 11:15

## 2018-11-27 RX ADMIN — CLOPIDOGREL BISULFATE 75 MILLIGRAM(S): 75 TABLET, FILM COATED ORAL at 11:15

## 2018-11-27 RX ADMIN — Medication 40 MILLIGRAM(S): at 05:31

## 2018-11-27 RX ADMIN — OXYCODONE HYDROCHLORIDE 5 MILLIGRAM(S): 5 TABLET ORAL at 20:14

## 2018-11-27 RX ADMIN — OXYCODONE HYDROCHLORIDE 5 MILLIGRAM(S): 5 TABLET ORAL at 21:00

## 2018-11-27 RX ADMIN — CARVEDILOL PHOSPHATE 25 MILLIGRAM(S): 80 CAPSULE, EXTENDED RELEASE ORAL at 05:30

## 2018-11-27 RX ADMIN — GABAPENTIN 100 MILLIGRAM(S): 400 CAPSULE ORAL at 05:30

## 2018-11-27 RX ADMIN — OXYCODONE HYDROCHLORIDE 5 MILLIGRAM(S): 5 TABLET ORAL at 12:20

## 2018-11-27 RX ADMIN — LOSARTAN POTASSIUM 100 MILLIGRAM(S): 100 TABLET, FILM COATED ORAL at 05:30

## 2018-11-27 RX ADMIN — Medication 100 MILLIGRAM(S): at 05:30

## 2018-11-27 RX ADMIN — Medication 12.5 MILLIGRAM(S): at 05:30

## 2018-11-27 RX ADMIN — GABAPENTIN 100 MILLIGRAM(S): 400 CAPSULE ORAL at 22:02

## 2018-11-27 RX ADMIN — Medication 1: at 11:14

## 2018-11-27 RX ADMIN — ATORVASTATIN CALCIUM 80 MILLIGRAM(S): 80 TABLET, FILM COATED ORAL at 22:02

## 2018-11-27 RX ADMIN — ENOXAPARIN SODIUM 40 MILLIGRAM(S): 100 INJECTION SUBCUTANEOUS at 22:03

## 2018-11-27 RX ADMIN — Medication 40 MILLIGRAM(S): at 17:14

## 2018-11-27 RX ADMIN — Medication 1 PATCH: at 05:42

## 2018-11-27 RX ADMIN — PANTOPRAZOLE SODIUM 40 MILLIGRAM(S): 20 TABLET, DELAYED RELEASE ORAL at 05:31

## 2018-11-27 RX ADMIN — Medication 100 MILLIGRAM(S): at 17:13

## 2018-11-27 RX ADMIN — OXYCODONE HYDROCHLORIDE 5 MILLIGRAM(S): 5 TABLET ORAL at 11:39

## 2018-11-27 RX ADMIN — Medication 2: at 17:11

## 2018-11-27 RX ADMIN — Medication 1 PATCH: at 11:42

## 2018-11-27 RX ADMIN — ISOSORBIDE MONONITRATE 60 MILLIGRAM(S): 60 TABLET, EXTENDED RELEASE ORAL at 11:15

## 2018-11-27 RX ADMIN — CARVEDILOL PHOSPHATE 25 MILLIGRAM(S): 80 CAPSULE, EXTENDED RELEASE ORAL at 17:13

## 2018-11-27 RX ADMIN — INSULIN GLARGINE 7 UNIT(S): 100 INJECTION, SOLUTION SUBCUTANEOUS at 22:02

## 2018-11-27 NOTE — PROGRESS NOTE ADULT - ASSESSMENT
Pt is 62 y/o female with PMHx of CAD s/p CABG x1, Stent placement x2, PAD s/p balloon angioplasty, DM2, HTN, HLD came to the ED with c/o  worsening dyspnea with minimal exertion, increasing abdominal girth and limited ambulation due to SOB. Found to have elevated proBNP and congestion on CXR- seen by cardiology in ED and admitted to medicine for acute on chronic HFpEF in consultation with cardiology. Pt kept on IV diuresis.        Shortness of breath likely due to HFpEF:   - Clinically feeling better.   - Normal LV function, poor RV function on TTE  - C/w CHF pathway, I/Os, daily weight.   - C/w IV Lasix and other home meds.  - Cardiology on board; recommendation noted       >Hyperkalemia- Resolved.   >H/o CAD s/p CABG and stent- Stable, no CP, C/w ASA/ Plavix/ Statins/ Coreg. Cardio on board- nuclear stress test done today- result pending. Cardio on board.  >H/o PAD- s/p balloon angioplasty, C/w ASA/ Plavix/ Lipitor.   >H/o HTN /HLD- Continue home medications   >H/o DM-2 - C/w LSS + FS monitoring. Hold home metformin.   >Chronic lower backache- Pain meds prn.  >DVT ppx- Lovenox.

## 2018-11-27 NOTE — REVIEW OF SYSTEMS
[Joint Pain] : joint pain [Negative] : Heme/Lymph [Chest  Pressure] : chest pressure [Lower Ext Edema] : lower extremity edema

## 2018-11-27 NOTE — REASON FOR VISIT
[Follow-Up - Clinic] : a clinic follow-up of [Coronary Artery Disease] : coronary artery disease [Dyspnea] : dyspnea [Hypertension] : hypertension [FreeTextEntry1] : chest pressure

## 2018-11-27 NOTE — PHYSICAL EXAM
[General Appearance - Well Developed] : well developed [Normal Conjunctiva] : the conjunctiva exhibited no abnormalities [Normal Oral Mucosa] : normal oral mucosa [Normal Oropharynx] : normal oropharynx [Normal Jugular Venous V Waves Present] : normal jugular venous V waves present [] : no respiratory distress [Respiration, Rhythm And Depth] : normal respiratory rhythm and effort [Heart Rate And Rhythm] : heart rate and rhythm were normal [Bowel Sounds] : normal bowel sounds [Nail Clubbing] : no clubbing of the fingernails [Skin Color & Pigmentation] : normal skin color and pigmentation [Oriented To Time, Place, And Person] : oriented to person, place, and time [FreeTextEntry1] : Limited gait.

## 2018-11-27 NOTE — PROGRESS NOTE ADULT - SUBJECTIVE AND OBJECTIVE BOX
JOSEFINA KUN Female 63y [id number]    Patient is a 63y old  Female who presents with a chief complaint of Shortness of breath (27 Nov 2018 08:37)      Subjective/objective:  Pt seen and examined at bedside before noon time, no over night event reported by night staff. She complaints of chronic back pain well controlled with current pain medicine, denied any more SOB, she states I feel like water has been taken out of my body.     Review of system:  No fever, chills, nausea, vomiting, headache, dizziness, chest pain,  or palpitation.      PHYSICAL EXAM:    Vital Signs Last 24 Hrs  T(C): 36.4 (27 Nov 2018 05:26), Max: 37.2 (26 Nov 2018 21:49)  T(F): 97.6 (27 Nov 2018 05:26), Max: 98.9 (26 Nov 2018 21:49)  HR: 58 (27 Nov 2018 07:34) (58 - 69)  BP: 152/64 (27 Nov 2018 05:26) (96/63 - 152/64)  BP(mean): --  RR: 16 (27 Nov 2018 07:34) (16 - 18)  SpO2: 97% (27 Nov 2018 07:34) (87% - 98%)      GENERAL: Pt lying comfortably, NAD.  CHEST/LUNG: Clear to auscultation bilaterally; No wheezing.  HEART: S1S2+, Regular rate and rhythm; No murmurs.  ABDOMEN: Soft, Nontender, Nondistended; Bowel sounds present.  Extremities: No pedal edema, right toe amputation.   NEURO: AAOX3, no focal deficits, no motor r sensory loss.  PSYCH: normal mood.      MEDICATIONS  (STANDING):  aspirin 325 milliGRAM(s) Oral daily  atorvastatin 80 milliGRAM(s) Oral at bedtime  carvedilol 25 milliGRAM(s) Oral every 12 hours  clopidogrel Tablet 75 milliGRAM(s) Oral daily  dextrose 5%. 1000 milliLiter(s) (50 mL/Hr) IV Continuous <Continuous>  dextrose 50% Injectable 12.5 Gram(s) IV Push once  dextrose 50% Injectable 25 Gram(s) IV Push once  dextrose 50% Injectable 25 Gram(s) IV Push once  docusate sodium 100 milliGRAM(s) Oral two times a day  DULoxetine 60 milliGRAM(s) Oral daily  enoxaparin Injectable 40 milliGRAM(s) SubCutaneous daily  furosemide   Injectable 40 milliGRAM(s) IV Push every 12 hours  gabapentin 100 milliGRAM(s) Oral three times a day  hydrochlorothiazide 12.5 milliGRAM(s) Oral daily  insulin lispro (HumaLOG) corrective regimen sliding scale   SubCutaneous three times a day before meals  isosorbide   mononitrate ER Tablet (IMDUR) 60 milliGRAM(s) Oral daily  losartan 100 milliGRAM(s) Oral daily  nitroglycerin    Patch 0.4 mG/Hr(s) 1 patch Transdermal daily  pantoprazole    Tablet 40 milliGRAM(s) Oral before breakfast    MEDICATIONS  (PRN):  acetaminophen   Tablet .. 650 milliGRAM(s) Oral every 6 hours PRN Temp greater or equal to 38C (100.4F), Mild Pain (1 - 3)  dextrose 40% Gel 15 Gram(s) Oral once PRN Blood Glucose LESS THAN 70 milliGRAM(s)/deciliter  glucagon  Injectable 1 milliGRAM(s) IntraMuscular once PRN Glucose LESS THAN 70 milligrams/deciliter  oxyCODONE    IR 5 milliGRAM(s) Oral every 6 hours PRN Moderate Pain (4 - 6)        Labs:  LABS:                        9.6    4.3   )-----------( 208      ( 27 Nov 2018 08:34 )             27.9     11-26    137  |  100  |  32.0<H>  ----------------------------<  110  5.3   |  31.0<H>  |  0.98    Ca    8.6      26 Nov 2018 11:32  Phos  4.3     11-26  Mg     2.1     11-26    TPro  7.8  /  Alb  3.5  /  TBili  0.9  /  DBili  x   /  AST  41<H>  /  ALT  49<H>  /  AlkPhos  276<H>  11-25    PT/INR - ( 25 Nov 2018 19:39 )   PT: 14.1 sec;   INR: 1.22 ratio         PTT - ( 25 Nov 2018 19:39 )  PTT:28.9 sec    LIVER FUNCTIONS - ( 25 Nov 2018 19:39 )  Alb: 3.5 g/dL / Pro: 7.8 g/dL / ALK PHOS: 276 U/L / ALT: 49 U/L / AST: 41 U/L / GGT: x               CARDIAC MARKERS ( 26 Nov 2018 11:32 )  x     / 0.06 ng/mL / x     / x     / x      CARDIAC MARKERS ( 25 Nov 2018 19:39 )  x     / 0.04 ng/mL / x     / x     / x

## 2018-11-27 NOTE — PROGRESS NOTE ADULT - SUBJECTIVE AND OBJECTIVE BOX
[place] [facility]                                                        Office: [address] [address]                                                       Telephone: [phone]. Fax: [phone]      CC: Shortness of breath    INTERVAL HISTORY: Improved.     MEDICATIONS  (STANDING):  aspirin 325 milliGRAM(s) Oral daily  atorvastatin 80 milliGRAM(s) Oral at bedtime  carvedilol 25 milliGRAM(s) Oral every 12 hours  clopidogrel Tablet 75 milliGRAM(s) Oral daily  dextrose 5%. 1000 milliLiter(s) (50 mL/Hr) IV Continuous <Continuous>  dextrose 50% Injectable 12.5 Gram(s) IV Push once  dextrose 50% Injectable 25 Gram(s) IV Push once  dextrose 50% Injectable 25 Gram(s) IV Push once  docusate sodium 100 milliGRAM(s) Oral two times a day  DULoxetine 60 milliGRAM(s) Oral daily  enoxaparin Injectable 40 milliGRAM(s) SubCutaneous daily  furosemide   Injectable 40 milliGRAM(s) IV Push every 12 hours  gabapentin 100 milliGRAM(s) Oral three times a day  hydrochlorothiazide 12.5 milliGRAM(s) Oral daily  influenza   Vaccine 0.5 milliLiter(s) IntraMuscular once  insulin lispro (HumaLOG) corrective regimen sliding scale   SubCutaneous three times a day before meals  isosorbide   mononitrate ER Tablet (IMDUR) 60 milliGRAM(s) Oral daily  losartan 100 milliGRAM(s) Oral daily  nitroglycerin    Patch 0.4 mG/Hr(s) 1 patch Transdermal daily  pantoprazole    Tablet 40 milliGRAM(s) Oral before breakfast    ROS: All others negative     PHYSICAL EXAM:  T(C): 36.4 (11-27-18 @ 05:26), Max: 37.2 (11-26-18 @ 21:49)  HR: 58 (11-27-18 @ 07:34) (58 - 69)  BP: 152/64 (11-27-18 @ 05:26) (96/63 - 152/64)  RR: 16 (11-27-18 @ 07:34) (16 - 18)  SpO2: 97% (11-27-18 @ 07:34) (87% - 98%)  Wt(kg): --  I&O's Summary    26 Nov 2018 07:01  -  27 Nov 2018 07:00  --------------------------------------------------------  IN: 0 mL / OUT: 1 mL / NET: -1 mL      Appearance: Normal	  HEENT:   limited vision.   Lymphatic: No lymphadenopathy  Cardiovascular: Normal S1 S2, No JVD, No murmurs, No edema  Respiratory: Lungs clear to auscultation	  Psychiatry: A & O x 3, Mood & affect appropriate  Gastrointestinal:  Soft, Non-tender, + BS	  Skin: No rashes, No ecchymoses, No cyanosis  Neurologic: Non-focal  Extremities: Normal range of motion, No clubbing, cyanosis or edema  Vascular: decreased pulses.     TELEMETRY: 	      LABS:	 	                        10.7   7.4   )-----------( 237      ( 25 Nov 2018 19:39 )             31.3     11-26    137  |  100  |  32.0<H>  ----------------------------<  110  5.3   |  31.0<H>  |  0.98    Ca    8.6      26 Nov 2018 11:32  Phos  4.3     11-26  Mg     2.1     11-26    TPro  7.8  /  Alb  3.5  /  TBili  0.9  /  DBili  x   /  AST  41<H>  /  ALT  49<H>  /  AlkPhos  276<H>  11-25

## 2018-11-27 NOTE — HISTORY OF PRESENT ILLNESS
[FreeTextEntry1] : Patient is a 60-year-old female with history of diabetes, CAD, previous CVA and PAD. Patient initially presented with symptoms of chest pain abnormal EKG and underwent cardiac catheterization at outside hospital. She was noted to have multivessel CAD and underwent bypass. Due to lack of conduits and poor targets patient only underwent LIMA to LAD. She presents today for assessment of residual CAD. She has had episodes of chest pain which are classified as class II. Patient also has significant claudication symptoms with left lower extremity being worse than right lower extremity.\par 10/2015- Underwent PCI to Circumflex. Medical management of RCA. \par 11/2015- PCI to left lower extremity - MINDA used. \par 8/2017- Presents with left hip pain. \par \par 11/20/2018\par Has been having chest pressure (6/10) every time  she walks, relieved with rest, has been going on for quiet a while; associated exertional shortness of breath. Has painful legs (9/10) even at rest with tight skin b/l. Will see Dr. Bone (Vascular) next week.

## 2018-11-27 NOTE — PROGRESS NOTE ADULT - PROBLEM SELECTOR PLAN 1
On lasix 40 mg IV bid. No labs this am yet.   Continue current. Normal LV function. Poor RV function. Most likely related to hypertension/sleep apnea.

## 2018-11-27 NOTE — DISCUSSION/SUMMARY
[___ Month(s)] : [unfilled] month(s) [With ___] : with [unfilled] [FreeTextEntry1] : Has been having chest pressure (6/10) every time  she walks, relieved with rest, has been going on for quiet a while; associated exertional shortness of breath. Has painful legs (9/10) even at rest with tight skin b/l. Will see Dr. Bone (Vascular) next week. \par \par A/P:\par \par 1. CAD, s/p CABG x 1 (LIMA to LAD) 5/2015. Has been exertional chest pressure going on for a while now, relieved with rest. Nonspecific T changes on EKG. On aspirin and plavix. Will add isosorbide ER 30 mg QD. Will schedule for Pharmacological (Lexiscan) Nuclear stress test\par 2. Exertional shortness of breath - will schedule for TTE\par 3. PAD - has painful legs. Will see Dr. Bone next week\par 4. Tight skin of legs, feels hard. Will schedule to US venous b/l LE\par 5. Continue other meds\par 6. Follow up in 1 month. \par \par Raza Memoracion, NP

## 2018-11-28 DIAGNOSIS — R18.8 OTHER ASCITES: ICD-10-CM

## 2018-11-28 LAB
ANION GAP SERPL CALC-SCNC: 10 MMOL/L — SIGNIFICANT CHANGE UP (ref 5–17)
BUN SERPL-MCNC: 44 MG/DL — HIGH (ref 8–20)
CALCIUM SERPL-MCNC: 8.1 MG/DL — LOW (ref 8.6–10.2)
CHLORIDE SERPL-SCNC: 97 MMOL/L — LOW (ref 98–107)
CO2 SERPL-SCNC: 25 MMOL/L — SIGNIFICANT CHANGE UP (ref 22–29)
CREAT SERPL-MCNC: 1.04 MG/DL — SIGNIFICANT CHANGE UP (ref 0.5–1.3)
GLUCOSE BLDC GLUCOMTR-MCNC: 155 MG/DL — HIGH (ref 70–99)
GLUCOSE BLDC GLUCOMTR-MCNC: 167 MG/DL — HIGH (ref 70–99)
GLUCOSE BLDC GLUCOMTR-MCNC: 198 MG/DL — HIGH (ref 70–99)
GLUCOSE BLDC GLUCOMTR-MCNC: 200 MG/DL — HIGH (ref 70–99)
GLUCOSE SERPL-MCNC: 164 MG/DL — HIGH (ref 70–115)
HCT VFR BLD CALC: 26.3 % — LOW (ref 37–47)
HGB BLD-MCNC: 9.3 G/DL — LOW (ref 12–16)
MCHC RBC-ENTMCNC: 27.7 PG — SIGNIFICANT CHANGE UP (ref 27–31)
MCHC RBC-ENTMCNC: 35.4 G/DL — SIGNIFICANT CHANGE UP (ref 32–36)
MCV RBC AUTO: 78.3 FL — LOW (ref 81–99)
PLATELET # BLD AUTO: 201 K/UL — SIGNIFICANT CHANGE UP (ref 150–400)
POTASSIUM SERPL-MCNC: 5.8 MMOL/L — HIGH (ref 3.5–5.3)
POTASSIUM SERPL-SCNC: 5.8 MMOL/L — HIGH (ref 3.5–5.3)
RBC # BLD: 3.36 M/UL — LOW (ref 4.4–5.2)
RBC # FLD: 18.6 % — HIGH (ref 11–15.6)
SODIUM SERPL-SCNC: 132 MMOL/L — LOW (ref 135–145)
WBC # BLD: 4.5 K/UL — LOW (ref 4.8–10.8)
WBC # FLD AUTO: 4.5 K/UL — LOW (ref 4.8–10.8)

## 2018-11-28 PROCEDURE — 99232 SBSQ HOSP IP/OBS MODERATE 35: CPT

## 2018-11-28 PROCEDURE — 93970 EXTREMITY STUDY: CPT | Mod: 26,59

## 2018-11-28 PROCEDURE — 93970 EXTREMITY STUDY: CPT | Mod: 26

## 2018-11-28 RX ORDER — FUROSEMIDE 40 MG
60 TABLET ORAL EVERY 12 HOURS
Qty: 0 | Refills: 0 | Status: DISCONTINUED | OUTPATIENT
Start: 2018-11-28 | End: 2018-11-29

## 2018-11-28 RX ORDER — SODIUM POLYSTYRENE SULFONATE 4.1 MEQ/G
30 POWDER, FOR SUSPENSION ORAL ONCE
Qty: 0 | Refills: 0 | Status: COMPLETED | OUTPATIENT
Start: 2018-11-28 | End: 2018-11-28

## 2018-11-28 RX ORDER — CARVEDILOL PHOSPHATE 80 MG/1
12.5 CAPSULE, EXTENDED RELEASE ORAL ONCE
Qty: 0 | Refills: 0 | Status: COMPLETED | OUTPATIENT
Start: 2018-11-28 | End: 2018-11-28

## 2018-11-28 RX ORDER — LOSARTAN POTASSIUM 100 MG/1
50 TABLET, FILM COATED ORAL DAILY
Qty: 0 | Refills: 0 | Status: DISCONTINUED | OUTPATIENT
Start: 2018-11-28 | End: 2018-12-06

## 2018-11-28 RX ORDER — SODIUM POLYSTYRENE SULFONATE 4.1 MEQ/G
15 POWDER, FOR SUSPENSION ORAL ONCE
Qty: 0 | Refills: 0 | Status: DISCONTINUED | OUTPATIENT
Start: 2018-11-28 | End: 2018-11-28

## 2018-11-28 RX ADMIN — ENOXAPARIN SODIUM 40 MILLIGRAM(S): 100 INJECTION SUBCUTANEOUS at 22:06

## 2018-11-28 RX ADMIN — Medication 1 PATCH: at 15:10

## 2018-11-28 RX ADMIN — GABAPENTIN 100 MILLIGRAM(S): 400 CAPSULE ORAL at 15:10

## 2018-11-28 RX ADMIN — CLOPIDOGREL BISULFATE 75 MILLIGRAM(S): 75 TABLET, FILM COATED ORAL at 12:47

## 2018-11-28 RX ADMIN — Medication 1: at 12:46

## 2018-11-28 RX ADMIN — Medication 60 MILLIGRAM(S): at 16:41

## 2018-11-28 RX ADMIN — INSULIN GLARGINE 7 UNIT(S): 100 INJECTION, SOLUTION SUBCUTANEOUS at 22:07

## 2018-11-28 RX ADMIN — LOSARTAN POTASSIUM 100 MILLIGRAM(S): 100 TABLET, FILM COATED ORAL at 06:10

## 2018-11-28 RX ADMIN — Medication 1 PATCH: at 22:12

## 2018-11-28 RX ADMIN — GABAPENTIN 100 MILLIGRAM(S): 400 CAPSULE ORAL at 06:10

## 2018-11-28 RX ADMIN — Medication 325 MILLIGRAM(S): at 12:47

## 2018-11-28 RX ADMIN — Medication 100 MILLIGRAM(S): at 16:41

## 2018-11-28 RX ADMIN — Medication 40 MILLIGRAM(S): at 06:10

## 2018-11-28 RX ADMIN — ATORVASTATIN CALCIUM 80 MILLIGRAM(S): 80 TABLET, FILM COATED ORAL at 22:06

## 2018-11-28 RX ADMIN — Medication 12.5 MILLIGRAM(S): at 06:10

## 2018-11-28 RX ADMIN — Medication 1: at 17:27

## 2018-11-28 RX ADMIN — Medication 100 MILLIGRAM(S): at 06:10

## 2018-11-28 RX ADMIN — Medication 1 PATCH: at 00:16

## 2018-11-28 RX ADMIN — CARVEDILOL PHOSPHATE 25 MILLIGRAM(S): 80 CAPSULE, EXTENDED RELEASE ORAL at 06:10

## 2018-11-28 RX ADMIN — DULOXETINE HYDROCHLORIDE 60 MILLIGRAM(S): 30 CAPSULE, DELAYED RELEASE ORAL at 12:47

## 2018-11-28 RX ADMIN — OXYCODONE HYDROCHLORIDE 5 MILLIGRAM(S): 5 TABLET ORAL at 06:50

## 2018-11-28 RX ADMIN — PANTOPRAZOLE SODIUM 40 MILLIGRAM(S): 20 TABLET, DELAYED RELEASE ORAL at 06:10

## 2018-11-28 RX ADMIN — GABAPENTIN 100 MILLIGRAM(S): 400 CAPSULE ORAL at 22:07

## 2018-11-28 RX ADMIN — Medication 1: at 08:53

## 2018-11-28 RX ADMIN — CARVEDILOL PHOSPHATE 12.5 MILLIGRAM(S): 80 CAPSULE, EXTENDED RELEASE ORAL at 17:00

## 2018-11-28 RX ADMIN — OXYCODONE HYDROCHLORIDE 5 MILLIGRAM(S): 5 TABLET ORAL at 06:10

## 2018-11-28 RX ADMIN — ISOSORBIDE MONONITRATE 60 MILLIGRAM(S): 60 TABLET, EXTENDED RELEASE ORAL at 12:47

## 2018-11-28 RX ADMIN — SODIUM POLYSTYRENE SULFONATE 30 GRAM(S): 4.1 POWDER, FOR SUSPENSION ORAL at 15:10

## 2018-11-28 NOTE — PROGRESS NOTE ADULT - SUBJECTIVE AND OBJECTIVE BOX
[place] [facility]  [facility]/[place]  [address] [place] [address]  P   F     REASON FOR VISIT: Follow up on chf  UPDATE:    Telemetry monitoring  Sinus batsheva no ectopy  NST done results are pending    ROS:  CV:  + sob with exertion, no chest pain  Resp:  no cough no mucus production  Gi:  No abd pain    Intake 240  Output 375    MEDICATIONS  (STANDING):  aspirin 325 milliGRAM(s) Oral daily  atorvastatin 80 milliGRAM(s) Oral at bedtime  carvedilol 25 milliGRAM(s) Oral every 12 hours  clopidogrel Tablet 75 milliGRAM(s) Oral daily  docusate sodium 100 milliGRAM(s) Oral two times a day  DULoxetine 60 milliGRAM(s) Oral daily  enoxaparin Injectable 40 milliGRAM(s) SubCutaneous daily  furosemide   Injectable 40 milliGRAM(s) IV Push every 12 hours  gabapentin 100 milliGRAM(s) Oral three times a day  hydrochlorothiazide 12.5 milliGRAM(s) Oral daily  insulin glargine Injectable (LANTUS) 7 Unit(s) SubCutaneous at bedtime  insulin lispro (HumaLOG) corrective regimen sliding scale   SubCutaneous three times a day before meals  isosorbide   mononitrate ER Tablet (IMDUR) 60 milliGRAM(s) Oral daily  losartan 100 milliGRAM(s) Oral daily  nitroglycerin    Patch 0.4 mG/Hr(s) 1 patch Transdermal daily  pantoprazole    Tablet 40 milliGRAM(s) Oral before breakfast    Vital Signs Last 24 Hrs  T(C): 36.1 (28 Nov 2018 09:38), Max: 36.7 (27 Nov 2018 15:54)  T(F): 97 (28 Nov 2018 09:38), Max: 98.1 (27 Nov 2018 15:54)  HR: 55 (28 Nov 2018 09:38) (55 - 62)  BP: 113/58 (28 Nov 2018 09:38) (113/58 - 136/74)  BP(mean): --  RR: 18 (28 Nov 2018 06:02) (18 - 18)  SpO2: 98% (28 Nov 2018 09:38) (98% - 100%)    Sitting in chair + Mildly dyspneic with movement  HEENT: No carotid bruitts   CV:  s1&s2 regular  RESP:  crackles in left base  GI:  obese soft non tender  EXT:  ++ edema  NEURO:  Alert and oriented      LABS:  CBC Full  -  ( 28 Nov 2018 06:13 )  WBC Count : 4.5 K/uL  Hemoglobin : 9.3 g/dL  Hematocrit : 26.3 %  Platelet Count - Automated : 201 K/uL        11-28    132<L>  |  97<L>  |  44.0<H>  ----------------------------<  164<H>  5.8<H>   |  25.0  |  1.04    Ca    8.1<L>      28 Nov 2018 06:13  Phos  4.3     11-26  Mg     2.1     11-26      CARDIAC MARKERS ( 26 Nov 2018 11:32 )  x     / 0.06 ng/mL / x     / x     / x        Ct of abdomen  1. No evidence of intestinal obstruction.  2. Diffuse body wall edema. Small to moderate amount of ascites. Findings   are nonspecific and can be seen in the setting of   3rd spacing.   3. Partially visualized left femoral bypass graft. There is a 4.3 x 2.9 x   4.1   cm fluid collection adjacent to the surgical clips. This may represent a   postoperative seroma/hematoma. Please correlate clinically for an   infected   collection.   4. Findings which may be related to pulmonary congestion including   bilateral   pleural effusions, right greater than left. Superimposed infection not   excluded.   5. Other findings as above.      Summary:   1. Left ventricular ejection fraction, by visual estimation, is 65 to   70%.   2. Normal global left ventricular systolic function.   3. There is mild concentric left ventricular hypertrophy.   4. Moderately enlarged right ventricle.   5. Moderately reduced RV systolic function.   6. There is no evidence of pericardial effusion.   7. Moderate mitral valve regurgitation.   8. Thickening of the anterior and posterior mitral valve leaflets.   9. Moderate tricuspid regurgitation.  10. Sclerotic aortic valve with normal opening.  11. Trace pulmonic valve regurgitation.  12. Estimated pulmonary artery systolic pressure is 45.7 mmHg assuming a   right atrial pressure of 8 mmHg, which is consistent with mild pulmonary   hypertension. [place] [facility]  [facility]/[place]  [address] [place] [address]  P   F     REASON FOR VISIT: Follow up on chf  UPDATE:    Telemetry monitoring  Sinus batsheva no ectopy  NST done results are pending    ROS:  CV:  + sob with exertion, no chest pain  Resp:  no cough no mucus production  Gi:  No abd pain    Intake 240  Output 375   this am  WEight today 247  baseline weight not obtained on admission    MEDICATIONS  (STANDING):  aspirin 325 milliGRAM(s) Oral daily  atorvastatin 80 milliGRAM(s) Oral at bedtime  carvedilol 25 milliGRAM(s) Oral every 12 hours  clopidogrel Tablet 75 milliGRAM(s) Oral daily  docusate sodium 100 milliGRAM(s) Oral two times a day  DULoxetine 60 milliGRAM(s) Oral daily  enoxaparin Injectable 40 milliGRAM(s) SubCutaneous daily  furosemide   Injectable 40 milliGRAM(s) IV Push every 12 hours  gabapentin 100 milliGRAM(s) Oral three times a day  hydrochlorothiazide 12.5 milliGRAM(s) Oral daily  insulin glargine Injectable (LANTUS) 7 Unit(s) SubCutaneous at bedtime  insulin lispro (HumaLOG) corrective regimen sliding scale   SubCutaneous three times a day before meals  isosorbide   mononitrate ER Tablet (IMDUR) 60 milliGRAM(s) Oral daily  losartan 100 milliGRAM(s) Oral daily  nitroglycerin    Patch 0.4 mG/Hr(s) 1 patch Transdermal daily  pantoprazole    Tablet 40 milliGRAM(s) Oral before breakfast    Vital Signs Last 24 Hrs  T(C): 36.1 (28 Nov 2018 09:38), Max: 36.7 (27 Nov 2018 15:54)  T(F): 97 (28 Nov 2018 09:38), Max: 98.1 (27 Nov 2018 15:54)  HR: 55 (28 Nov 2018 09:38) (55 - 62)  BP: 113/58 (28 Nov 2018 09:38) (113/58 - 136/74)  BP(mean): --  RR: 18 (28 Nov 2018 06:02) (18 - 18)  SpO2: 98% (28 Nov 2018 09:38) (98% - 100%)    Sitting in chair + Mildly dyspneic with movement  HEENT: No carotid bruitts   CV:  s1&s2 regular  RESP:  crackles in left base  GI:  obese soft non tender  EXT:  ++ edema  NEURO:  Alert and oriented    LABS:  CBC Full  -  ( 28 Nov 2018 06:13 )  WBC Count : 4.5 K/uL  Hemoglobin : 9.3 g/dL  Hematocrit : 26.3 %  Platelet Count - Automated : 201 K/uL    11-28    132<L>  |  97<L>  |  44.0<H>  ----------------------------<  164<H>  5.8<H>   |  25.0  |  1.04    Ca    8.1<L>      28 Nov 2018 06:13  Phos  4.3     11-26  Mg     2.1     11-26      CARDIAC MARKERS ( 26 Nov 2018 11:32 )  x     / 0.06 ng/mL / x     / x     / x        Ct of abdomen  1. No evidence of intestinal obstruction.  2. Diffuse body wall edema. Small to moderate amount of ascites. Findings   are nonspecific and can be seen in the setting of   3rd spacing.   3. Partially visualized left femoral bypass graft. There is a 4.3 x 2.9 x   4.1   cm fluid collection adjacent to the surgical clips. This may represent a   postoperative seroma/hematoma. Please correlate clinically for an   infected   collection.   4. Findings which may be related to pulmonary congestion including   bilateral   pleural effusions, right greater than left. Superimposed infection not   excluded.   5. Other findings as above.      Summary:   1. Left ventricular ejection fraction, by visual estimation, is 65 to   70%.   2. Normal global left ventricular systolic function.   3. There is mild concentric left ventricular hypertrophy.   4. Moderately enlarged right ventricle.   5. Moderately reduced RV systolic function.   6. There is no evidence of pericardial effusion.   7. Moderate mitral valve regurgitation.   8. Thickening of the anterior and posterior mitral valve leaflets.   9. Moderate tricuspid regurgitation.  10. Sclerotic aortic valve with normal opening.  11. Trace pulmonic valve regurgitation.  12. Estimated pulmonary artery systolic pressure is 45.7 mmHg assuming a   right atrial pressure of 8 mmHg, which is consistent with mild pulmonary   hypertension.

## 2018-11-28 NOTE — PROGRESS NOTE ADULT - ASSESSMENT
64 y/o female with PMHx of CAD s/p CABG x1 Stent placement x2,  PAD s/p balloon angioplasty, DM2, HTN, HLD came to the ED complaining of worsening dyspnea with minimal exertion and increasing abdominal girth over the past 2 weeks.  Echo with ef 65%, mild lvh, mod reduced rv function rsvp (mild pulm htn) presents with Cook, leg edema  Cat scan abdomen with small to moderate ascites (liver normal size  albumin normal) 64 y/o female with PMHx of CAD s/p CABG x1 Stent placement x2,  PAD s/p balloon angioplasty, DM2, HTN, HLD came to the ED complaining of worsening dyspnea with minimal exertion and increasing abdominal girth over the past 2 weeks.  Echo with ef 65%, mild lvh, mod reduced rv function rsvp (mild pulm htn) presents with Cook, leg edema  Cat scan abdomen with small to moderate ascites (liver normal size albumin normal).

## 2018-11-28 NOTE — PROGRESS NOTE ADULT - PROBLEM SELECTOR PLAN 1
Await results of NST  c/w  Coreg, asa, plavix and lipitor  Lft just midly elevated  would repeat and if worsens hold lipitor Await results of NST  c/w  Coreg, asa, Plavix and Lipitor  Lft just mildly elevated  would repeat and if worsens hold Lipitor Stress test positive study in inferior wall  Patient will need cath  c/w  Coreg, asa, Plavix and Lipitor  Lft just mildly elevated  would repeat and if worsens hold Lipitor Stress test mildly positive study in inferior wall  Medical treatment for cad  c/w  Coreg, asa, Plavix and Lipitor  Lft just mildly elevated  would repeat and if worsens hold Lipitor

## 2018-11-28 NOTE — PROGRESS NOTE ADULT - SUBJECTIVE AND OBJECTIVE BOX
JOSEFINAKUN Female 63y [id number]    Patient is a 63y old  Female who presents with a chief complaint of Shortness of breath (28 Nov 2018 10:56)      Off Service note:  Pt seen and examined at bedside- here for CHF, no over night event reported by night staff. Pt reports SOB improving, had chronic lower back pain controlled with pain meds.     Review of system:  No fever, chills, nausea, vomiting, headache, dizziness, chest pain, SOB or palpitation.      PHYSICAL EXAM:    Vital Signs Last 24 Hrs  T(C): 36.6 (28 Nov 2018 15:44), Max: 36.7 (27 Nov 2018 20:05)  T(F): 97.8 (28 Nov 2018 15:44), Max: 98.1 (28 Nov 2018 06:02)  HR: 58 (28 Nov 2018 15:44) (55 - 62)  BP: 106/60 (28 Nov 2018 15:44) (106/60 - 136/74)  BP(mean): --  RR: 17 (28 Nov 2018 15:44) (17 - 18)  SpO2: 100% (28 Nov 2018 15:44) (98% - 100%)    GENERAL: Pt lying comfortably, NAD.  CHEST/LUNG: Clear to auscultation bilaterally; No wheezing.  HEART: S1S2+, Regular rate and rhythm; No murmurs.  ABDOMEN: Soft, Nontender, Nondistended; Bowel sounds present.  Extremities: No pedal edema, right toe amputation. old foot wound.  NEURO: AAOX3, no focal deficits, no motor r sensory loss.  PSYCH: normal mood.        MEDICATIONS  (STANDING):  aspirin 325 milliGRAM(s) Oral daily  atorvastatin 80 milliGRAM(s) Oral at bedtime  carvedilol 25 milliGRAM(s) Oral every 12 hours  clopidogrel Tablet 75 milliGRAM(s) Oral daily  dextrose 5%. 1000 milliLiter(s) (50 mL/Hr) IV Continuous <Continuous>  dextrose 50% Injectable 12.5 Gram(s) IV Push once  dextrose 50% Injectable 25 Gram(s) IV Push once  dextrose 50% Injectable 25 Gram(s) IV Push once  docusate sodium 100 milliGRAM(s) Oral two times a day  DULoxetine 60 milliGRAM(s) Oral daily  enoxaparin Injectable 40 milliGRAM(s) SubCutaneous daily  furosemide   Injectable 60 milliGRAM(s) IV Push every 12 hours  gabapentin 100 milliGRAM(s) Oral three times a day  hydrochlorothiazide 12.5 milliGRAM(s) Oral daily  insulin glargine Injectable (LANTUS) 7 Unit(s) SubCutaneous at bedtime  insulin lispro (HumaLOG) corrective regimen sliding scale   SubCutaneous three times a day before meals  isosorbide   mononitrate ER Tablet (IMDUR) 60 milliGRAM(s) Oral daily  losartan 50 milliGRAM(s) Oral daily  nitroglycerin    Patch 0.4 mG/Hr(s) 1 patch Transdermal daily  pantoprazole    Tablet 40 milliGRAM(s) Oral before breakfast  sodium polystyrene sulfonate Suspension 15 Gram(s) Oral once    MEDICATIONS  (PRN):  acetaminophen   Tablet .. 650 milliGRAM(s) Oral every 6 hours PRN Temp greater or equal to 38C (100.4F), Mild Pain (1 - 3)  dextrose 40% Gel 15 Gram(s) Oral once PRN Blood Glucose LESS THAN 70 milliGRAM(s)/deciliter  glucagon  Injectable 1 milliGRAM(s) IntraMuscular once PRN Glucose LESS THAN 70 milligrams/deciliter  oxyCODONE    IR 5 milliGRAM(s) Oral every 6 hours PRN Moderate Pain (4 - 6)        Labs:  LABS:                        9.3    4.5   )-----------( 201      ( 28 Nov 2018 06:13 )             26.3     11-28    132<L>  |  97<L>  |  44.0<H>  ----------------------------<  164<H>  5.8<H>   |  25.0  |  1.04    Ca    8.1<L>      28 Nov 2018 06:13

## 2018-11-28 NOTE — PROGRESS NOTE ADULT - PROBLEM SELECTOR PLAN 3
with normal creat  decrease losartan to 50mg qd  Kayexelate x 1  repeat bun creat in the am with normal creat  decrease losartan to 50mg qd  Kayexalate x 1  repeat bun creat in the am With normal creat  decrease losartan to 50mg qd  Kayexalate x 1  repeat bun creat in the am

## 2018-11-28 NOTE — PROGRESS NOTE ADULT - ASSESSMENT
Pt is 64 y/o female with PMHx of CAD s/p CABG x1, Stent placement x2, PAD s/p balloon angioplasty, DM2, HTN, HLD came to the ED with c/o  worsening dyspnea with minimal exertion, increasing abdominal girth and limited ambulation due to SOB. Found to have elevated proBNP and congestion on CXR- seen by cardiology in ED and admitted to medicine for acute on chronic HFpEF in consultation with cardiology. Pt kept on IV diuresis, clinically improving.        Shortness of breath likely due to acute on chronic HFpEF:   - Clinically feeling better.   - Normal LV function, poor RV function on TTE  - C/w CHF pathway, I/Os, daily weight.   - C/w IV Lasix and other home meds ARBs/ Coreg  - Cardiology on board; recommendation noted       >Hyperkalemia- Resolved.   >H/o CAD s/p CABG and stent- Stable, no CP, nuclear stress positive.  C/w ASA/ Plavix/ Statins/ Imdur/ Coreg. Cardio on board- further plan per cardiology.   >H/o PAD- s/p balloon angioplasty, C/w ASA/ Plavix/ Lipitor.   >H/o HTN /HLD- Continue home medications   >H/o DM-2 - C/w Lantus, LSS + FS monitoring. Hold home metformin.   >Chronic lower backache- Pain meds prn.  >DVT ppx- Lovenox.

## 2018-11-28 NOTE — PROGRESS NOTE ADULT - ATTENDING COMMENTS
Unclear etiology for hyperkalemia. Has increasing BUN but all other indices do not point towards KEITH. Will monitor on higher dose of lasix.   RV dysfunction- most likely related to sleep apnea/obesity.   Nuclear stress test reviewed- inferior ischemia. Coronary angiogram reviewed. Non revascularizable CAD. No intervention. Not the cause of CHF.

## 2018-11-28 NOTE — PROGRESS NOTE ADULT - PROBLEM SELECTOR PLAN 2
Echo with EF 65%  LVH no sig valvular disease mild pulm htn  Diastolic dysfunction with Right sided heart failure  increase lasix and watch bun/creat closely  daily weights and intake and out put  Low na diet  check venous dopplers Echo with EF 65%  LVH no sig valvular disease mild pulm htn  Diastolic dysfunction with Right sided heart failure  increase Lasix and watch bun/creat closely  daily weights and intake and out put  Low na diet  check venous dopplers Echo with EF 65%  LVH no sig valvular disease mild pulm htn  Diastolic dysfunction with Right sided heart failure  increase Lasix and watch bun/creat closely  Cath planned for positive stress test  daily weights and intake and out put  Low na diet  check venous dopplers

## 2018-11-29 LAB
ANION GAP SERPL CALC-SCNC: 8 MMOL/L — SIGNIFICANT CHANGE UP (ref 5–17)
BUN SERPL-MCNC: 49 MG/DL — HIGH (ref 8–20)
CALCIUM SERPL-MCNC: 8.3 MG/DL — LOW (ref 8.6–10.2)
CHLORIDE SERPL-SCNC: 96 MMOL/L — LOW (ref 98–107)
CO2 SERPL-SCNC: 30 MMOL/L — HIGH (ref 22–29)
CREAT SERPL-MCNC: 1.14 MG/DL — SIGNIFICANT CHANGE UP (ref 0.5–1.3)
GLUCOSE BLDC GLUCOMTR-MCNC: 124 MG/DL — HIGH (ref 70–99)
GLUCOSE BLDC GLUCOMTR-MCNC: 174 MG/DL — HIGH (ref 70–99)
GLUCOSE BLDC GLUCOMTR-MCNC: 182 MG/DL — HIGH (ref 70–99)
GLUCOSE BLDC GLUCOMTR-MCNC: 184 MG/DL — HIGH (ref 70–99)
GLUCOSE SERPL-MCNC: 134 MG/DL — HIGH (ref 70–115)
HCT VFR BLD CALC: 27.3 % — LOW (ref 37–47)
HGB BLD-MCNC: 9.4 G/DL — LOW (ref 12–16)
MCHC RBC-ENTMCNC: 27 PG — SIGNIFICANT CHANGE UP (ref 27–31)
MCHC RBC-ENTMCNC: 34.4 G/DL — SIGNIFICANT CHANGE UP (ref 32–36)
MCV RBC AUTO: 78.4 FL — LOW (ref 81–99)
PLATELET # BLD AUTO: 227 K/UL — SIGNIFICANT CHANGE UP (ref 150–400)
POTASSIUM SERPL-MCNC: 4.9 MMOL/L — SIGNIFICANT CHANGE UP (ref 3.5–5.3)
POTASSIUM SERPL-SCNC: 4.9 MMOL/L — SIGNIFICANT CHANGE UP (ref 3.5–5.3)
RBC # BLD: 3.48 M/UL — LOW (ref 4.4–5.2)
RBC # FLD: 18.5 % — HIGH (ref 11–15.6)
SODIUM SERPL-SCNC: 134 MMOL/L — LOW (ref 135–145)
WBC # BLD: 5.3 K/UL — SIGNIFICANT CHANGE UP (ref 4.8–10.8)
WBC # FLD AUTO: 5.3 K/UL — SIGNIFICANT CHANGE UP (ref 4.8–10.8)

## 2018-11-29 PROCEDURE — 99232 SBSQ HOSP IP/OBS MODERATE 35: CPT

## 2018-11-29 RX ADMIN — OXYCODONE HYDROCHLORIDE 5 MILLIGRAM(S): 5 TABLET ORAL at 13:10

## 2018-11-29 RX ADMIN — OXYCODONE HYDROCHLORIDE 5 MILLIGRAM(S): 5 TABLET ORAL at 22:50

## 2018-11-29 RX ADMIN — CARVEDILOL PHOSPHATE 25 MILLIGRAM(S): 80 CAPSULE, EXTENDED RELEASE ORAL at 17:38

## 2018-11-29 RX ADMIN — DULOXETINE HYDROCHLORIDE 60 MILLIGRAM(S): 30 CAPSULE, DELAYED RELEASE ORAL at 13:04

## 2018-11-29 RX ADMIN — GABAPENTIN 100 MILLIGRAM(S): 400 CAPSULE ORAL at 21:46

## 2018-11-29 RX ADMIN — Medication 1 PATCH: at 03:10

## 2018-11-29 RX ADMIN — Medication 1 PATCH: at 21:48

## 2018-11-29 RX ADMIN — Medication 12.5 MILLIGRAM(S): at 06:56

## 2018-11-29 RX ADMIN — ATORVASTATIN CALCIUM 80 MILLIGRAM(S): 80 TABLET, FILM COATED ORAL at 21:46

## 2018-11-29 RX ADMIN — LOSARTAN POTASSIUM 50 MILLIGRAM(S): 100 TABLET, FILM COATED ORAL at 06:56

## 2018-11-29 RX ADMIN — Medication 1: at 17:38

## 2018-11-29 RX ADMIN — Medication 325 MILLIGRAM(S): at 13:04

## 2018-11-29 RX ADMIN — OXYCODONE HYDROCHLORIDE 5 MILLIGRAM(S): 5 TABLET ORAL at 14:00

## 2018-11-29 RX ADMIN — INSULIN GLARGINE 7 UNIT(S): 100 INJECTION, SOLUTION SUBCUTANEOUS at 21:47

## 2018-11-29 RX ADMIN — Medication 1: at 13:03

## 2018-11-29 RX ADMIN — GABAPENTIN 100 MILLIGRAM(S): 400 CAPSULE ORAL at 06:56

## 2018-11-29 RX ADMIN — GABAPENTIN 100 MILLIGRAM(S): 400 CAPSULE ORAL at 13:04

## 2018-11-29 RX ADMIN — ENOXAPARIN SODIUM 40 MILLIGRAM(S): 100 INJECTION SUBCUTANEOUS at 13:04

## 2018-11-29 RX ADMIN — CARVEDILOL PHOSPHATE 25 MILLIGRAM(S): 80 CAPSULE, EXTENDED RELEASE ORAL at 06:56

## 2018-11-29 RX ADMIN — CLOPIDOGREL BISULFATE 75 MILLIGRAM(S): 75 TABLET, FILM COATED ORAL at 13:04

## 2018-11-29 RX ADMIN — Medication 1 PATCH: at 13:04

## 2018-11-29 RX ADMIN — Medication 60 MILLIGRAM(S): at 06:56

## 2018-11-29 RX ADMIN — PANTOPRAZOLE SODIUM 40 MILLIGRAM(S): 20 TABLET, DELAYED RELEASE ORAL at 06:56

## 2018-11-29 RX ADMIN — Medication 100 MILLIGRAM(S): at 06:56

## 2018-11-29 RX ADMIN — ISOSORBIDE MONONITRATE 60 MILLIGRAM(S): 60 TABLET, EXTENDED RELEASE ORAL at 13:04

## 2018-11-29 RX ADMIN — Medication 20 MILLIGRAM(S): at 17:38

## 2018-11-29 RX ADMIN — OXYCODONE HYDROCHLORIDE 5 MILLIGRAM(S): 5 TABLET ORAL at 21:53

## 2018-11-29 RX ADMIN — Medication 100 MILLIGRAM(S): at 17:38

## 2018-11-29 NOTE — PROGRESS NOTE ADULT - SUBJECTIVE AND OBJECTIVE BOX
KUN ROCHA    [id number]    63y      Female    INTERVAL HPI/OVERNIGHT EVENTS:    patient being seen for acute on chronic diastolic hf and med management. patient seen at bedside and states feeling well.       REVIEW OF SYSTEMS:    CONSTITUTIONAL: No fever, weight loss, or fatigue  RESPIRATORY: No cough, wheezing, hemoptysis; No shortness of breath  CARDIOVASCULAR: No chest pain, palpitations  GASTROINTESTINAL: No abdominal or epigastric pain. No nausea, vomiting  NEUROLOGICAL: No headaches, memory loss, loss of strength.  MISCELLANEOUS:      Vital Signs Last 24 Hrs  T(C): 37.2 (29 Nov 2018 10:15), Max: 37.2 (29 Nov 2018 10:15)  T(F): 98.9 (29 Nov 2018 10:15), Max: 98.9 (29 Nov 2018 10:15)  HR: 65 (29 Nov 2018 10:15) (58 - 65)  BP: 121/68 (29 Nov 2018 10:15) (106/60 - 136/56)  BP(mean): --  RR: 16 (29 Nov 2018 10:15) (16 - 17)  SpO2: 96% (29 Nov 2018 10:15) (96% - 100%)    PHYSICAL EXAM:    GENERAL: NAD, well-groomed  HEENT: PERRL, +EOMI  NECK: soft, Supple, No JVD,   CHEST/LUNG: Clear to auscultation bilaterally; No wheezing  HEART: S1S2+, Regular rate and rhythm; No murmurs, rubs, or gallops  ABDOMEN: Soft, Nontender, Nondistended; Bowel sounds present  EXTREMITIES:  2+ Peripheral Pulses, No clubbing, cyanosis, or edema  SKIN: No rashes or lesions  NEURO: AAOX3, no focal deficits, no motor r sensory loss  PSYCH: normal mood      LABS:                        9.4    5.3   )-----------( 227      ( 29 Nov 2018 06:05 )             27.3     11-29    134<L>  |  96<L>  |  49.0<H>  ----------------------------<  134<H>  4.9   |  30.0<H>  |  1.14    Ca    8.3<L>      29 Nov 2018 06:05              MEDICATIONS  (STANDING):  aspirin 325 milliGRAM(s) Oral daily  atorvastatin 80 milliGRAM(s) Oral at bedtime  carvedilol 25 milliGRAM(s) Oral every 12 hours  clopidogrel Tablet 75 milliGRAM(s) Oral daily  dextrose 5%. 1000 milliLiter(s) (50 mL/Hr) IV Continuous <Continuous>  dextrose 50% Injectable 12.5 Gram(s) IV Push once  dextrose 50% Injectable 25 Gram(s) IV Push once  dextrose 50% Injectable 25 Gram(s) IV Push once  docusate sodium 100 milliGRAM(s) Oral two times a day  DULoxetine 60 milliGRAM(s) Oral daily  enoxaparin Injectable 40 milliGRAM(s) SubCutaneous daily  gabapentin 100 milliGRAM(s) Oral three times a day  insulin glargine Injectable (LANTUS) 7 Unit(s) SubCutaneous at bedtime  insulin lispro (HumaLOG) corrective regimen sliding scale   SubCutaneous three times a day before meals  isosorbide   mononitrate ER Tablet (IMDUR) 60 milliGRAM(s) Oral daily  losartan 50 milliGRAM(s) Oral daily  nitroglycerin    Patch 0.4 mG/Hr(s) 1 patch Transdermal daily  pantoprazole    Tablet 40 milliGRAM(s) Oral before breakfast  torsemide 20 milliGRAM(s) Oral every 12 hours    MEDICATIONS  (PRN):  acetaminophen   Tablet .. 650 milliGRAM(s) Oral every 6 hours PRN Temp greater or equal to 38C (100.4F), Mild Pain (1 - 3)  dextrose 40% Gel 15 Gram(s) Oral once PRN Blood Glucose LESS THAN 70 milliGRAM(s)/deciliter  glucagon  Injectable 1 milliGRAM(s) IntraMuscular once PRN Glucose LESS THAN 70 milligrams/deciliter  oxyCODONE    IR 5 milliGRAM(s) Oral every 6 hours PRN Moderate Pain (4 - 6)      RADIOLOGY & ADDITIONAL TESTS: KUN ROCHA    [id number]    63y      Female    INTERVAL HPI/OVERNIGHT EVENTS:    patient being seen for acute on chronic diastolic hf and med management. patient seen at bedside and states feeling well.       REVIEW OF SYSTEMS:    CONSTITUTIONAL: No fever, weight loss, or fatigue  RESPIRATORY: No cough, wheezing, hemoptysis; No shortness of breath  CARDIOVASCULAR: No chest pain, palpitations  GASTROINTESTINAL: No abdominal or epigastric pain. No nausea, vomiting  NEUROLOGICAL: No headaches, memory loss, loss of strength.  MISCELLANEOUS:      Vital Signs Last 24 Hrs  T(C): 37.2 (29 Nov 2018 10:15), Max: 37.2 (29 Nov 2018 10:15)  T(F): 98.9 (29 Nov 2018 10:15), Max: 98.9 (29 Nov 2018 10:15)  HR: 65 (29 Nov 2018 10:15) (58 - 65)  BP: 121/68 (29 Nov 2018 10:15) (106/60 - 136/56)  BP(mean): --  RR: 16 (29 Nov 2018 10:15) (16 - 17)  SpO2: 96% (29 Nov 2018 10:15) (96% - 100%)    PHYSICAL EXAM:      GENERAL: Pt lying comfortably, NAD.  CHEST/LUNG: Clear to auscultation bilaterally; No wheezing.  HEART: S1S2+, Regular rate and rhythm; No murmurs.  ABDOMEN: Soft, Nontender, Nondistended; Bowel sounds present.  Extremities: trace pedal edema, right toe amputation. old foot wound.  NEURO: AAOX3, no focal deficits, no motor r sensory loss.  PSYCH: normal mood.      LABS:                        9.4    5.3   )-----------( 227      ( 29 Nov 2018 06:05 )             27.3     11-29    134<L>  |  96<L>  |  49.0<H>  ----------------------------<  134<H>  4.9   |  30.0<H>  |  1.14    Ca    8.3<L>      29 Nov 2018 06:05              MEDICATIONS  (STANDING):  aspirin 325 milliGRAM(s) Oral daily  atorvastatin 80 milliGRAM(s) Oral at bedtime  carvedilol 25 milliGRAM(s) Oral every 12 hours  clopidogrel Tablet 75 milliGRAM(s) Oral daily  dextrose 5%. 1000 milliLiter(s) (50 mL/Hr) IV Continuous <Continuous>  dextrose 50% Injectable 12.5 Gram(s) IV Push once  dextrose 50% Injectable 25 Gram(s) IV Push once  dextrose 50% Injectable 25 Gram(s) IV Push once  docusate sodium 100 milliGRAM(s) Oral two times a day  DULoxetine 60 milliGRAM(s) Oral daily  enoxaparin Injectable 40 milliGRAM(s) SubCutaneous daily  gabapentin 100 milliGRAM(s) Oral three times a day  insulin glargine Injectable (LANTUS) 7 Unit(s) SubCutaneous at bedtime  insulin lispro (HumaLOG) corrective regimen sliding scale   SubCutaneous three times a day before meals  isosorbide   mononitrate ER Tablet (IMDUR) 60 milliGRAM(s) Oral daily  losartan 50 milliGRAM(s) Oral daily  nitroglycerin    Patch 0.4 mG/Hr(s) 1 patch Transdermal daily  pantoprazole    Tablet 40 milliGRAM(s) Oral before breakfast  torsemide 20 milliGRAM(s) Oral every 12 hours    MEDICATIONS  (PRN):  acetaminophen   Tablet .. 650 milliGRAM(s) Oral every 6 hours PRN Temp greater or equal to 38C (100.4F), Mild Pain (1 - 3)  dextrose 40% Gel 15 Gram(s) Oral once PRN Blood Glucose LESS THAN 70 milliGRAM(s)/deciliter  glucagon  Injectable 1 milliGRAM(s) IntraMuscular once PRN Glucose LESS THAN 70 milligrams/deciliter  oxyCODONE    IR 5 milliGRAM(s) Oral every 6 hours PRN Moderate Pain (4 - 6)      RADIOLOGY & ADDITIONAL TESTS:

## 2018-11-29 NOTE — PROGRESS NOTE ADULT - ASSESSMENT
Pt is 64 y/o female with PMHx of CAD s/p CABG x1, Stent placement x2, PAD s/p balloon angioplasty, DM2, HTN, HLD came to the ED with c/o  worsening dyspnea with minimal exertion, increasing abdominal girth and limited ambulation due to SOB. Found to have elevated proBNP and congestion on CXR- seen by cardiology in ED and admitted to medicine for acute on chronic HFpEF in consultation with cardiology.        1) Shortness of breath likely due to acute on chronic HFpEF:   --> spoke to cardio dc iv lasix and start torsemide bid    2) Ameena with elevated bun --> worsened  --> dc iv lasix , start torsemide    3)_Hyperkalemia- Resolved.     4) H/o CAD s/p CABG and stent- Stable, no CP, nuclear stress positive.  C/w ASA/ Plavix/ Statins/ Imdur/ Coreg. Cardio on board- further plan per cardiology.     5) H/o PAD- s/p balloon angioplasty, C/w ASA/ Plavix/ Lipitor.     6) H/o HTN /HLD- Continue home medications     7) H/o DM-2 - C/w Lantus, LSS + FS monitoring. Hold home metformin.     8) Chronic lower backache- Pain meds prn.    9) DVT ppx- Lovenox.     dispo dc tomorrow

## 2018-11-29 NOTE — PROGRESS NOTE ADULT - SUBJECTIVE AND OBJECTIVE BOX
Patient with improved volume status and symptoms of shortness of breath.     Increasing BUN/creatinine.    Change to torsemide 20 mg po bid.     If labs stable tomorrow, consider dc planning.

## 2018-11-30 ENCOUNTER — TRANSCRIPTION ENCOUNTER (OUTPATIENT)
Age: 63
End: 2018-11-30

## 2018-11-30 LAB
ANION GAP SERPL CALC-SCNC: 6 MMOL/L — SIGNIFICANT CHANGE UP (ref 5–17)
BUN SERPL-MCNC: 48 MG/DL — HIGH (ref 8–20)
CALCIUM SERPL-MCNC: 8.5 MG/DL — LOW (ref 8.6–10.2)
CHLORIDE SERPL-SCNC: 96 MMOL/L — LOW (ref 98–107)
CO2 SERPL-SCNC: 33 MMOL/L — HIGH (ref 22–29)
CREAT SERPL-MCNC: 1.06 MG/DL — SIGNIFICANT CHANGE UP (ref 0.5–1.3)
GLUCOSE BLDC GLUCOMTR-MCNC: 116 MG/DL — HIGH (ref 70–99)
GLUCOSE BLDC GLUCOMTR-MCNC: 155 MG/DL — HIGH (ref 70–99)
GLUCOSE BLDC GLUCOMTR-MCNC: 221 MG/DL — HIGH (ref 70–99)
GLUCOSE SERPL-MCNC: 165 MG/DL — HIGH (ref 70–115)
MAGNESIUM SERPL-MCNC: 2.1 MG/DL — SIGNIFICANT CHANGE UP (ref 1.6–2.6)
POTASSIUM SERPL-MCNC: 4.8 MMOL/L — SIGNIFICANT CHANGE UP (ref 3.5–5.3)
POTASSIUM SERPL-SCNC: 4.8 MMOL/L — SIGNIFICANT CHANGE UP (ref 3.5–5.3)
SODIUM SERPL-SCNC: 135 MMOL/L — SIGNIFICANT CHANGE UP (ref 135–145)

## 2018-11-30 PROCEDURE — 71045 X-RAY EXAM CHEST 1 VIEW: CPT | Mod: 26

## 2018-11-30 PROCEDURE — 99232 SBSQ HOSP IP/OBS MODERATE 35: CPT

## 2018-11-30 RX ORDER — DULOXETINE HYDROCHLORIDE 30 MG/1
1 CAPSULE, DELAYED RELEASE ORAL
Qty: 0 | Refills: 0 | COMMUNITY
Start: 2018-11-30

## 2018-11-30 RX ORDER — PANTOPRAZOLE SODIUM 20 MG/1
1 TABLET, DELAYED RELEASE ORAL
Qty: 0 | Refills: 0 | COMMUNITY

## 2018-11-30 RX ORDER — TELMISARTAN AND HYDROCHLOROTHIAZIDE 40; 12.5 MG/1; MG/1
1 TABLET ORAL
Qty: 0 | Refills: 0 | COMMUNITY

## 2018-11-30 RX ORDER — PANTOPRAZOLE SODIUM 20 MG/1
1 TABLET, DELAYED RELEASE ORAL
Qty: 0 | Refills: 0 | COMMUNITY
Start: 2018-11-30

## 2018-11-30 RX ORDER — DULOXETINE HYDROCHLORIDE 30 MG/1
1 CAPSULE, DELAYED RELEASE ORAL
Qty: 0 | Refills: 0 | COMMUNITY

## 2018-11-30 RX ORDER — ATORVASTATIN CALCIUM 80 MG/1
1 TABLET, FILM COATED ORAL
Qty: 0 | Refills: 0 | COMMUNITY
Start: 2018-11-30

## 2018-11-30 RX ORDER — GABAPENTIN 400 MG/1
1 CAPSULE ORAL
Qty: 0 | Refills: 0 | COMMUNITY
Start: 2018-11-30

## 2018-11-30 RX ORDER — LOSARTAN POTASSIUM 100 MG/1
1 TABLET, FILM COATED ORAL
Qty: 0 | Refills: 0 | COMMUNITY
Start: 2018-11-30

## 2018-11-30 RX ORDER — ISOSORBIDE MONONITRATE 60 MG/1
1 TABLET, EXTENDED RELEASE ORAL
Qty: 0 | Refills: 0 | COMMUNITY

## 2018-11-30 RX ORDER — DOCUSATE SODIUM 100 MG
1 CAPSULE ORAL
Qty: 0 | Refills: 0 | COMMUNITY
Start: 2018-11-30

## 2018-11-30 RX ORDER — CLOPIDOGREL BISULFATE 75 MG/1
1 TABLET, FILM COATED ORAL
Qty: 0 | Refills: 0 | COMMUNITY
Start: 2018-11-30

## 2018-11-30 RX ORDER — LOSARTAN POTASSIUM 100 MG/1
1 TABLET, FILM COATED ORAL
Qty: 30 | Refills: 0 | OUTPATIENT
Start: 2018-11-30 | End: 2018-12-29

## 2018-11-30 RX ORDER — GABAPENTIN 400 MG/1
1 CAPSULE ORAL
Qty: 0 | Refills: 0 | COMMUNITY

## 2018-11-30 RX ORDER — CARVEDILOL PHOSPHATE 80 MG/1
1 CAPSULE, EXTENDED RELEASE ORAL
Qty: 0 | Refills: 0 | COMMUNITY
Start: 2018-11-30

## 2018-11-30 RX ORDER — DOCUSATE SODIUM 100 MG
1 CAPSULE ORAL
Qty: 0 | Refills: 0 | COMMUNITY

## 2018-11-30 RX ORDER — CARVEDILOL PHOSPHATE 80 MG/1
1 CAPSULE, EXTENDED RELEASE ORAL
Qty: 0 | Refills: 0 | COMMUNITY

## 2018-11-30 RX ORDER — ASPIRIN/CALCIUM CARB/MAGNESIUM 324 MG
81 TABLET ORAL DAILY
Qty: 0 | Refills: 0 | Status: DISCONTINUED | OUTPATIENT
Start: 2018-11-30 | End: 2018-12-26

## 2018-11-30 RX ADMIN — CLOPIDOGREL BISULFATE 75 MILLIGRAM(S): 75 TABLET, FILM COATED ORAL at 12:48

## 2018-11-30 RX ADMIN — Medication 100 MILLIGRAM(S): at 05:29

## 2018-11-30 RX ADMIN — OXYCODONE HYDROCHLORIDE 5 MILLIGRAM(S): 5 TABLET ORAL at 21:40

## 2018-11-30 RX ADMIN — PANTOPRAZOLE SODIUM 40 MILLIGRAM(S): 20 TABLET, DELAYED RELEASE ORAL at 05:29

## 2018-11-30 RX ADMIN — GABAPENTIN 100 MILLIGRAM(S): 400 CAPSULE ORAL at 21:20

## 2018-11-30 RX ADMIN — INSULIN GLARGINE 7 UNIT(S): 100 INJECTION, SOLUTION SUBCUTANEOUS at 21:20

## 2018-11-30 RX ADMIN — GABAPENTIN 100 MILLIGRAM(S): 400 CAPSULE ORAL at 05:29

## 2018-11-30 RX ADMIN — Medication 100 MILLIGRAM(S): at 17:45

## 2018-11-30 RX ADMIN — OXYCODONE HYDROCHLORIDE 5 MILLIGRAM(S): 5 TABLET ORAL at 20:47

## 2018-11-30 RX ADMIN — Medication 1 PATCH: at 01:06

## 2018-11-30 RX ADMIN — ISOSORBIDE MONONITRATE 60 MILLIGRAM(S): 60 TABLET, EXTENDED RELEASE ORAL at 12:48

## 2018-11-30 RX ADMIN — Medication 20 MILLIGRAM(S): at 05:29

## 2018-11-30 RX ADMIN — CARVEDILOL PHOSPHATE 25 MILLIGRAM(S): 80 CAPSULE, EXTENDED RELEASE ORAL at 05:29

## 2018-11-30 RX ADMIN — CARVEDILOL PHOSPHATE 25 MILLIGRAM(S): 80 CAPSULE, EXTENDED RELEASE ORAL at 17:45

## 2018-11-30 RX ADMIN — Medication 1: at 12:49

## 2018-11-30 RX ADMIN — DULOXETINE HYDROCHLORIDE 60 MILLIGRAM(S): 30 CAPSULE, DELAYED RELEASE ORAL at 12:48

## 2018-11-30 RX ADMIN — GABAPENTIN 100 MILLIGRAM(S): 400 CAPSULE ORAL at 15:49

## 2018-11-30 RX ADMIN — Medication 325 MILLIGRAM(S): at 12:48

## 2018-11-30 RX ADMIN — ENOXAPARIN SODIUM 40 MILLIGRAM(S): 100 INJECTION SUBCUTANEOUS at 21:20

## 2018-11-30 RX ADMIN — LOSARTAN POTASSIUM 50 MILLIGRAM(S): 100 TABLET, FILM COATED ORAL at 05:29

## 2018-11-30 RX ADMIN — Medication 2: at 17:45

## 2018-11-30 RX ADMIN — ATORVASTATIN CALCIUM 80 MILLIGRAM(S): 80 TABLET, FILM COATED ORAL at 21:21

## 2018-11-30 RX ADMIN — Medication 20 MILLIGRAM(S): at 17:45

## 2018-11-30 NOTE — DISCHARGE NOTE ADULT - MEDICATION SUMMARY - MEDICATIONS TO TAKE
I will START or STAY ON the medications listed below when I get home from the hospital:    aspirin 325 mg oral delayed release tablet  -- 1 tab(s) by mouth once a day  -- Indication: For CAD (coronary artery disease)    losartan 50 mg oral tablet  -- 1 tab(s) by mouth once a day  -- Indication: For Htn    isosorbide mononitrate 60 mg oral tablet, extended release  -- 1 tab(s) by mouth once a day (in the morning)  -- Indication: For Htn    gabapentin 100 mg oral capsule  -- 1 cap(s) by mouth 3 times a day  -- Indication: For pain    DULoxetine 60 mg oral delayed release capsule  -- 1 cap(s) by mouth once a day  -- Indication: For depression    metFORMIN 850 mg oral tablet  -- orally 2 times a day (with meals)  -- Indication: For dm2    atorvastatin 80 mg oral tablet  -- 1 tab(s) by mouth once a day (at bedtime)  -- Indication: For Hyperlipidmiea    clopidogrel 75 mg oral tablet  -- 1 tab(s) by mouth once a day  -- Indication: For CAD (coronary artery disease)    carvedilol 25 mg oral tablet  -- 1 tab(s) by mouth every 12 hours  -- Indication: For CAD (coronary artery disease)    torsemide 20 mg oral tablet  -- 1 tab(s) by mouth every 12 hours  -- Indication: For diastolic hf    docusate sodium 100 mg oral capsule  -- 1 cap(s) by mouth 2 times a day  -- Indication: For Constipaton    magnesium oxide 400 mg (240 mg elemental magnesium) oral tablet  -- 1 tab(s) by mouth once a day  -- Indication: For replacemetns    pantoprazole 40 mg oral delayed release tablet  -- 1 tab(s) by mouth once a day (before a meal)  -- Indication: For gerd I will START or STAY ON the medications listed below when I get home from the hospital:    aspirin 81 mg oral tablet, chewable  -- 1 tab(s) by mouth once a day  -- Indication: For CAD    losartan 50 mg oral tablet  -- 1 tab(s) by mouth once a day  -- Indication: For Htn    isosorbide mononitrate 60 mg oral tablet, extended release  -- 1 tab(s) by mouth once a day (in the morning)  -- Indication: For Htn    gabapentin 100 mg oral capsule  -- 1 cap(s) by mouth 3 times a day  -- Indication: For pain    DULoxetine 60 mg oral delayed release capsule  -- 1 cap(s) by mouth once a day  -- Indication: For depression    metFORMIN 850 mg oral tablet  -- orally 2 times a day (with meals)  -- Indication: For dm2    atorvastatin 80 mg oral tablet  -- 1 tab(s) by mouth once a day (at bedtime)  -- Indication: For Hyperlipidmiea    clopidogrel 75 mg oral tablet  -- 1 tab(s) by mouth once a day  -- Indication: For CAD (coronary artery disease)    carvedilol 25 mg oral tablet  -- 1 tab(s) by mouth every 12 hours  -- Indication: For CAD (coronary artery disease)    torsemide 20 mg oral tablet  -- 1 tab(s) by mouth every 12 hours  -- Indication: For diastolic hf    docusate sodium 100 mg oral capsule  -- 1 cap(s) by mouth 2 times a day  -- Indication: For Constipaton    magnesium oxide 400 mg (240 mg elemental magnesium) oral tablet  -- 1 tab(s) by mouth once a day  -- Indication: For replacemetns    pantoprazole 40 mg oral delayed release tablet  -- 1 tab(s) by mouth once a day (before a meal)  -- Indication: For gerd

## 2018-11-30 NOTE — PROGRESS NOTE ADULT - ASSESSMENT
Pt is 64 y/o female with PMHx of CAD s/p CABG x1, Stent placement x2, PAD s/p balloon angioplasty, DM2, HTN, HLD came to the ED with c/o  worsening dyspnea with minimal exertion, increasing abdominal girth and limited ambulation due to SOB. Found to have elevated proBNP and congestion on CXR- seen by cardiology in ED and admitted to medicine for acute on chronic HFpEF in consultation with cardiology.      1) Shortness of breath likely due to acute on chronic HFpEF:   --> c/ torsemide  --> as per nurse patient deasturated will get xray, hold dc to set up home o2 tomorrow     2) Ameena with elevated bun -->imrpoved    3)_Hyperkalemia- Resolved.     4) H/o CAD s/p CABG and stent- Stable, no CP, nuclear stress positive.  C/w ASA/ Plavix/ Statins/ Imdur/ Coreg. Cardio on board- further plan per cardiology.     5) H/o PAD- s/p balloon angioplasty, C/w ASA/ Plavix/ Lipitor.     6) H/o HTN /HLD- Continue home medications     7) H/o DM-2 - C/w Lantus, LSS + FS monitoring. Hold home metformin.     8) Chronic lower backache- Pain meds prn.    dispo --> hold dc   --> chest xray, may need home o2 on dc

## 2018-11-30 NOTE — DISCHARGE NOTE ADULT - HOME CARE AGENCY
[facility] [phone]  START OF CARE 12/1/2018 [facility] [phone]  START OF CARE 12/27/2018

## 2018-11-30 NOTE — DISCHARGE NOTE ADULT - PATIENT PORTAL LINK FT
You can access the [email or web address], offered by [facility], by registering with the following website: [email or web address]

## 2018-11-30 NOTE — DISCHARGE NOTE ADULT - HOSPITAL COURSE
62 y/o female with PMHx of CAD s/p CABG x1 Stent placement x2,  PAD s/p balloon angioplasty, DM2, HTN, HLD came to the ED complaining of worsening dyspnea with minimal exertion and increasing abdominal girth over the past 2 weeks. She usually ambulate with walker since R foot heel & 3 toes amputation last December without difficulty but lately has been having difficulty ambulating due to shortness of breath.     patient admitted to [facility] and seen by cardio in consult. patient started on iv diuretics and had tte which showed preserved ef and diastolic hf. patient transtioned to po torsemide bid and is now stable for dc home. patient advised better dietary compliance.    time spent on dc 32 mins 64 y/o female with CAD s/p CABG and PCI, PAD s/p balloon angioplasty, diabetes mellitus, hypertension, hyperlipidemia admitted with complaints of worsening exertional shortness of breath. She was evaluated by cardiology and started on IV diuretics, for acute on chronic diastolic CHF. Stress test was done, showed inferior ischemia, medical management advised. She was noted to have CO2 narcosis, placed on NIV. Cardiology planned for right heart catheterisation given RV dysfunction. Dobutamine was given for RV dysfunction. She was noted to have contrast induced nephropathy, diuretics were held. Nephrology consulted, Losartan was held. IR was consulted for pleural effusion, underwent right thoracentesis. RRT was called, for lethargy, related to hypercapnia, improved with NIV. Diuretics were reinstated given worsening symptoms. Her renal function improved. Venofer was added for anemia. PT consulted, advised SINGH. Given improvement in respiratory status, she was started on oral diuretics. Her CXR showed opacification of right lung. Metolazone was added by nephrology for fluid overload. IR consulted for right thoracentesis, she underwent thoracentesis on 12/12, 1500 cc of fluid was removed. Her renal function normalized. 64 y/o female with CAD s/p CABG and PCI, PAD s/p balloon angioplasty, diabetes mellitus, hypertension, hyperlipidemia admitted with complaints of worsening exertional shortness of breath. She was evaluated by cardiology and started on IV diuretics, for acute on chronic diastolic CHF. Stress test was done, showed inferior ischemia, medical management advised. She was noted to have CO2 narcosis, placed on NIV. Cardiology planned for right heart catheterisation given RV dysfunction. Dobutamine was given for RV dysfunction. She was noted to have contrast induced nephropathy, diuretics were held. Nephrology consulted, Losartan was held. IR was consulted for pleural effusion, underwent right thoracentesis. RRT was called, for lethargy, related to hypercapnia, improved with NIV. Diuretics were reinstated given worsening symptoms. Her renal function improved. Venofer was added for anemia. PT consulted, advised SINGH. Given improvement in respiratory status, she was started on oral diuretics. Her CXR showed opacification of right lung. Metolazone was added by nephrology for fluid overload. IR consulted for right thoracentesis, she underwent thoracentesis on 12/12, 1500 cc of fluid was removed. Her renal function normalized.    On 12/17,found unresponsive, code blue was called CPR initiated. Patient regained consciousness.  ABG following the event showed  compensated respiratory acidosis, with severe hypercapnia. Patient again transfer to ICU placed on AVAPs. Syncope work up completed; EEG: indicative of mild generalized background slowing.  No epileptiform or seizure activity during this monitoring period.  CT head: non-remarkable. Clinical correlation advised. Patient respiratory status improved, now downgraded back to medical floor. Hospital course complicated by syncope x 2, Acute on chronic diastolic failure & Hypercapneic/ hypoxic respiratory failure    The patient had improvement in her dyspnea and lower extremity edema with diuresis. She was encouraged to be compliant with the use of Bipap. The chest tube remained in place and output was monitored. Repeat chest Xray noted resolution of the pleural effusion. The patient was seen by Thoracic Surgery and the chest tube was removed. The patient had no further dyspnea. Transfer to a rehabilitation facility was discussed with the patient but she requested returning home upon discharge from the hospital. Instructions were given to follow up with her primary care physician and cardiologist for further management.    39 minutes total time

## 2018-11-30 NOTE — PROGRESS NOTE ADULT - SUBJECTIVE AND OBJECTIVE BOX
KUN ROCHA    [id number]    63y      Female    INTERVAL HPI/OVERNIGHT EVENTS:    patient being seen for acute on chronic diastolic hf and med management. patient seen at bedside and deneis any complaints      REVIEW OF SYSTEMS:    CONSTITUTIONAL: No fever, weight loss, or fatigue  RESPIRATORY: No cough, wheezing, hemoptysis; No shortness of breath  CARDIOVASCULAR: No chest pain, palpitations  GASTROINTESTINAL: No abdominal or epigastric pain. No nausea, vomiting  NEUROLOGICAL: No headaches, memory loss, loss of strength.  MISCELLANEOUS:      Vital Signs Last 24 Hrs  T(C): 36.9 (30 Nov 2018 15:14), Max: 36.9 (30 Nov 2018 15:14)  T(F): 98.4 (30 Nov 2018 15:14), Max: 98.4 (30 Nov 2018 15:14)  HR: 67 (30 Nov 2018 15:14) (60 - 67)  BP: 124/68 (30 Nov 2018 15:14) (122/60 - 140/75)  BP(mean): --  RR: 18 (30 Nov 2018 15:14) (17 - 19)  SpO2: 99% (30 Nov 2018 15:14) (98% - 100%)    PHYSICAL EXAM:    GENERAL: Pt lying comfortably, NAD.  CHEST/LUNG: diminished  HEART: S1S2+, Regular rate and rhythm; No murmurs.  ABDOMEN: Soft, Nontender, Nondistended; Bowel sounds present.  Extremities: trace pedal edema, right toe amputation. old foot wound.  NEURO: AAOX3, no focal deficits, no motor r sensory loss.  PSYCH: normal mood.        LABS:                        9.4    5.3   )-----------( 227      ( 29 Nov 2018 06:05 )             27.3     11-30    135  |  96<L>  |  48.0<H>  ----------------------------<  165<H>  4.8   |  33.0<H>  |  1.06    Ca    8.5<L>      30 Nov 2018 11:20  Mg     2.1     11-30              MEDICATIONS  (STANDING):  aspirin 325 milliGRAM(s) Oral daily  atorvastatin 80 milliGRAM(s) Oral at bedtime  carvedilol 25 milliGRAM(s) Oral every 12 hours  clopidogrel Tablet 75 milliGRAM(s) Oral daily  dextrose 5%. 1000 milliLiter(s) (50 mL/Hr) IV Continuous <Continuous>  dextrose 50% Injectable 12.5 Gram(s) IV Push once  dextrose 50% Injectable 25 Gram(s) IV Push once  dextrose 50% Injectable 25 Gram(s) IV Push once  docusate sodium 100 milliGRAM(s) Oral two times a day  DULoxetine 60 milliGRAM(s) Oral daily  enoxaparin Injectable 40 milliGRAM(s) SubCutaneous daily  gabapentin 100 milliGRAM(s) Oral three times a day  insulin glargine Injectable (LANTUS) 7 Unit(s) SubCutaneous at bedtime  insulin lispro (HumaLOG) corrective regimen sliding scale   SubCutaneous three times a day before meals  isosorbide   mononitrate ER Tablet (IMDUR) 60 milliGRAM(s) Oral daily  losartan 50 milliGRAM(s) Oral daily  nitroglycerin    Patch 0.4 mG/Hr(s) 1 patch Transdermal daily  pantoprazole    Tablet 40 milliGRAM(s) Oral before breakfast  torsemide 20 milliGRAM(s) Oral every 12 hours    MEDICATIONS  (PRN):  acetaminophen   Tablet .. 650 milliGRAM(s) Oral every 6 hours PRN Temp greater or equal to 38C (100.4F), Mild Pain (1 - 3)  dextrose 40% Gel 15 Gram(s) Oral once PRN Blood Glucose LESS THAN 70 milliGRAM(s)/deciliter  glucagon  Injectable 1 milliGRAM(s) IntraMuscular once PRN Glucose LESS THAN 70 milligrams/deciliter  oxyCODONE    IR 5 milliGRAM(s) Oral every 6 hours PRN Moderate Pain (4 - 6)      RADIOLOGY & ADDITIONAL TESTS:    chest xray ordered

## 2018-11-30 NOTE — DISCHARGE NOTE ADULT - CARE PROVIDER_API CALL
[place]), Cardiovascular Disease; Internal Medicine; Interventional Cardiology  [address] [place] [address]  Phone: [phone]  Fax: [phone]    pc[facility],   Phone: (   )    -  Fax: (   )    -

## 2018-11-30 NOTE — DISCHARGE NOTE ADULT - CARE PLAN
Principal Discharge DX:	Acute on chronic diastolic congestive heart failure  Goal:	resolved  Assessment and plan of treatment:	torsemide increased to bid  follow up with cardio  Secondary Diagnosis:	CAD (coronary artery disease)  Goal:	home meds  Secondary Diagnosis:	S/P CABG x 1  Goal:	follow up with cardo  Secondary Diagnosis:	Hypertension  Goal:	home meds  Assessment and plan of treatment:	low salt diet  Secondary Diagnosis:	Hyperlipidemia  Goal:	home meds  Secondary Diagnosis:	Diabetes  Goal:	home metformin  Assessment and plan of treatment:	a1c 7.3  Secondary Diagnosis:	KEITH (acute kidney injury)  Goal:	resolved Principal Discharge DX:	Acute on chronic diastolic congestive heart failure  Goal:	resolved  Assessment and plan of treatment:	Continue on torsemide. Follow up with Cardiology within one week.  Secondary Diagnosis:	CAD (coronary artery disease)  Goal:	.  Assessment and plan of treatment:	Follow up with Cardiology for further management.  Secondary Diagnosis:	S/P CABG x 1  Goal:	follow up with cardo  Secondary Diagnosis:	Hypertension  Goal:	home meds  Assessment and plan of treatment:	low salt diet  Secondary Diagnosis:	Hyperlipidemia  Goal:	home meds  Secondary Diagnosis:	Diabetes  Goal:	home metformin  Assessment and plan of treatment:	a1c 7.3  Secondary Diagnosis:	KEITH (acute kidney injury)  Goal:	resolved

## 2018-11-30 NOTE — DISCHARGE NOTE ADULT - MEDICATION SUMMARY - MEDICATIONS TO CHANGE
I will SWITCH the dose or number of times a day I take the medications listed below when I get home from the hospital:    torsemide 20 mg oral tablet  -- 1 tab(s) by mouth once a day    isosorbide mononitrate 30 mg oral tablet, extended release  -- 1 tab(s) by mouth once a day (in the morning)

## 2018-11-30 NOTE — DISCHARGE NOTE ADULT - MEDICATION SUMMARY - MEDICATIONS TO STOP TAKING
I will STOP taking the medications listed below when I get home from the hospital:    hydrochlorothiazide-telmisartan 12.5 mg-80 mg oral tablet  -- 1 tab(s) by mouth once a day    telmisartan-hydrochlorothiazide 80mg-12.5mg oral tablet  -- 1 tab(s) by mouth once a day

## 2018-11-30 NOTE — DISCHARGE NOTE ADULT - SECONDARY DIAGNOSIS.
CAD (coronary artery disease) S/P CABG x 1 Hypertension Hyperlipidemia Diabetes KEITH (acute kidney injury)

## 2018-12-01 LAB
ANION GAP SERPL CALC-SCNC: 6 MMOL/L — SIGNIFICANT CHANGE UP (ref 5–17)
BUN SERPL-MCNC: 51 MG/DL — HIGH (ref 8–20)
CALCIUM SERPL-MCNC: 8.6 MG/DL — SIGNIFICANT CHANGE UP (ref 8.6–10.2)
CHLORIDE SERPL-SCNC: 96 MMOL/L — LOW (ref 98–107)
CO2 SERPL-SCNC: 33 MMOL/L — HIGH (ref 22–29)
CREAT SERPL-MCNC: 1.03 MG/DL — SIGNIFICANT CHANGE UP (ref 0.5–1.3)
GLUCOSE BLDC GLUCOMTR-MCNC: 109 MG/DL — HIGH (ref 70–99)
GLUCOSE BLDC GLUCOMTR-MCNC: 127 MG/DL — HIGH (ref 70–99)
GLUCOSE BLDC GLUCOMTR-MCNC: 83 MG/DL — SIGNIFICANT CHANGE UP (ref 70–99)
GLUCOSE BLDC GLUCOMTR-MCNC: 93 MG/DL — SIGNIFICANT CHANGE UP (ref 70–99)
GLUCOSE SERPL-MCNC: 129 MG/DL — HIGH (ref 70–115)
MAGNESIUM SERPL-MCNC: 2 MG/DL — SIGNIFICANT CHANGE UP (ref 1.6–2.6)
POTASSIUM SERPL-MCNC: 4.6 MMOL/L — SIGNIFICANT CHANGE UP (ref 3.5–5.3)
POTASSIUM SERPL-SCNC: 4.6 MMOL/L — SIGNIFICANT CHANGE UP (ref 3.5–5.3)
SODIUM SERPL-SCNC: 135 MMOL/L — SIGNIFICANT CHANGE UP (ref 135–145)

## 2018-12-01 PROCEDURE — 99232 SBSQ HOSP IP/OBS MODERATE 35: CPT

## 2018-12-01 PROCEDURE — 71260 CT THORAX DX C+: CPT | Mod: 26

## 2018-12-01 PROCEDURE — 74177 CT ABD & PELVIS W/CONTRAST: CPT | Mod: 26

## 2018-12-01 RX ORDER — FUROSEMIDE 40 MG
40 TABLET ORAL EVERY 12 HOURS
Qty: 0 | Refills: 0 | Status: DISCONTINUED | OUTPATIENT
Start: 2018-12-01 | End: 2018-12-05

## 2018-12-01 RX ADMIN — Medication 100 MILLIGRAM(S): at 19:07

## 2018-12-01 RX ADMIN — Medication 100 MILLIGRAM(S): at 06:40

## 2018-12-01 RX ADMIN — GABAPENTIN 100 MILLIGRAM(S): 400 CAPSULE ORAL at 06:40

## 2018-12-01 RX ADMIN — Medication 1 PATCH: at 12:58

## 2018-12-01 RX ADMIN — LOSARTAN POTASSIUM 50 MILLIGRAM(S): 100 TABLET, FILM COATED ORAL at 06:40

## 2018-12-01 RX ADMIN — CLOPIDOGREL BISULFATE 75 MILLIGRAM(S): 75 TABLET, FILM COATED ORAL at 12:58

## 2018-12-01 RX ADMIN — ISOSORBIDE MONONITRATE 60 MILLIGRAM(S): 60 TABLET, EXTENDED RELEASE ORAL at 12:58

## 2018-12-01 RX ADMIN — DULOXETINE HYDROCHLORIDE 60 MILLIGRAM(S): 30 CAPSULE, DELAYED RELEASE ORAL at 12:58

## 2018-12-01 RX ADMIN — PANTOPRAZOLE SODIUM 40 MILLIGRAM(S): 20 TABLET, DELAYED RELEASE ORAL at 06:40

## 2018-12-01 RX ADMIN — GABAPENTIN 100 MILLIGRAM(S): 400 CAPSULE ORAL at 23:25

## 2018-12-01 RX ADMIN — OXYCODONE HYDROCHLORIDE 5 MILLIGRAM(S): 5 TABLET ORAL at 19:38

## 2018-12-01 RX ADMIN — Medication 40 MILLIGRAM(S): at 14:39

## 2018-12-01 RX ADMIN — GABAPENTIN 100 MILLIGRAM(S): 400 CAPSULE ORAL at 14:38

## 2018-12-01 RX ADMIN — CARVEDILOL PHOSPHATE 25 MILLIGRAM(S): 80 CAPSULE, EXTENDED RELEASE ORAL at 06:40

## 2018-12-01 RX ADMIN — INSULIN GLARGINE 7 UNIT(S): 100 INJECTION, SOLUTION SUBCUTANEOUS at 23:25

## 2018-12-01 RX ADMIN — ATORVASTATIN CALCIUM 80 MILLIGRAM(S): 80 TABLET, FILM COATED ORAL at 23:25

## 2018-12-01 RX ADMIN — OXYCODONE HYDROCHLORIDE 5 MILLIGRAM(S): 5 TABLET ORAL at 06:59

## 2018-12-01 RX ADMIN — Medication 20 MILLIGRAM(S): at 06:40

## 2018-12-01 RX ADMIN — ENOXAPARIN SODIUM 40 MILLIGRAM(S): 100 INJECTION SUBCUTANEOUS at 23:25

## 2018-12-01 RX ADMIN — Medication 81 MILLIGRAM(S): at 12:58

## 2018-12-01 RX ADMIN — CARVEDILOL PHOSPHATE 25 MILLIGRAM(S): 80 CAPSULE, EXTENDED RELEASE ORAL at 19:27

## 2018-12-01 RX ADMIN — Medication 1 PATCH: at 18:27

## 2018-12-01 RX ADMIN — OXYCODONE HYDROCHLORIDE 5 MILLIGRAM(S): 5 TABLET ORAL at 07:42

## 2018-12-01 NOTE — PROGRESS NOTE ADULT - ASSESSMENT
Pt is 62 y/o female with PMHx of CAD s/p CABG x1, Stent placement x2, PAD s/p balloon angioplasty, DM2, HTN, HLD came to the ED with c/o  worsening dyspnea with minimal exertion, increasing abdominal girth and limited ambulation due to SOB. Found to have elevated proBNP and congestion on CXR- seen by cardiology in ED and admitted to medicine for acute on chronic HFpEF in consultation with cardiology.      1) Shortness of breath likely due to acute on chronic HFpEF:   --> xray as above is worsened  --> cardio following  --> will change back to iv lasix as per cardio     2) Ameena with elevated bun -->stable    3) H/o CAD s/p CABG and stent- Stable, no CP, nuclear stress positive.  C/w ASA/ Plavix/ Statins/ Imdur/ Coreg. Cardio on board- further plan per cardiology.     4) H/o PAD- s/p balloon angioplasty, C/w ASA/ Plavix/ Lipitor.     5) H/o HTN /HLD- Continue home medications     6) H/o DM-2 - C/w Lantus, LSS + FS monitoring. Hold home metformin.     7) Chronic lower backache- Pain meds prn.    dispo --> spoke to cardio  --> ct chest and abd pelvis with iv contrast ordered

## 2018-12-01 NOTE — PROGRESS NOTE ADULT - SUBJECTIVE AND OBJECTIVE BOX
KUN ROCHA    [id number]    63y      Female    INTERVAL HPI/OVERNIGHT EVENTS:  patient being seen for acute on chronic diastolic hf and med management. patient seen at bedside and states feeling slightly sob.     patients dc held yesterday due to hypoxia, delaying her discharge     REVIEW OF SYSTEMS:    CONSTITUTIONAL: No fever, weight loss, or fatigue  RESPIRATORY: SOB  CARDIOVASCULAR: No chest pain, palpitations  GASTROINTESTINAL: No abdominal or epigastric pain. No nausea, vomiting  NEUROLOGICAL: No headaches, memory loss, loss of strength.  MISCELLANEOUS:      Vital Signs Last 24 Hrs  T(C): 36.7 (01 Dec 2018 06:34), Max: 36.9 (30 Nov 2018 15:14)  T(F): 98 (01 Dec 2018 06:34), Max: 98.4 (30 Nov 2018 15:14)  HR: 67 (01 Dec 2018 06:34) (64 - 77)  BP: 134/74 (01 Dec 2018 06:34) (124/68 - 142/82)  BP(mean): --  RR: 18 (01 Dec 2018 06:34) (18 - 18)  SpO2: 100% (01 Dec 2018 06:34) (74% - 100%)    PHYSICAL EXAM:    GENERAL: Pt lying comfortably, NAD.  CHEST/LUNG: diminished, crackles  HEART: S1S2+, Regular rate and rhythm; No murmurs.  ABDOMEN: Soft, Nontender, Nondistended; Bowel sounds present.  Extremities: trace pedal edema, right toe amputation. old foot wound.  NEURO: AAOX3, no focal deficits, no motor r sensory loss.  PSYCH: normal mood.        LABS:    12-01    135  |  96<L>  |  51.0<H>  ----------------------------<  129<H>  4.6   |  33.0<H>  |  1.03    Ca    8.6      01 Dec 2018 06:28  Mg     2.0     12-01              MEDICATIONS  (STANDING):  aspirin  chewable 81 milliGRAM(s) Oral daily  atorvastatin 80 milliGRAM(s) Oral at bedtime  carvedilol 25 milliGRAM(s) Oral every 12 hours  clopidogrel Tablet 75 milliGRAM(s) Oral daily  dextrose 5%. 1000 milliLiter(s) (50 mL/Hr) IV Continuous <Continuous>  dextrose 50% Injectable 12.5 Gram(s) IV Push once  dextrose 50% Injectable 25 Gram(s) IV Push once  dextrose 50% Injectable 25 Gram(s) IV Push once  docusate sodium 100 milliGRAM(s) Oral two times a day  DULoxetine 60 milliGRAM(s) Oral daily  enoxaparin Injectable 40 milliGRAM(s) SubCutaneous daily  gabapentin 100 milliGRAM(s) Oral three times a day  insulin glargine Injectable (LANTUS) 7 Unit(s) SubCutaneous at bedtime  insulin lispro (HumaLOG) corrective regimen sliding scale   SubCutaneous three times a day before meals  isosorbide   mononitrate ER Tablet (IMDUR) 60 milliGRAM(s) Oral daily  losartan 50 milliGRAM(s) Oral daily  nitroglycerin    Patch 0.4 mG/Hr(s) 1 patch Transdermal daily  pantoprazole    Tablet 40 milliGRAM(s) Oral before breakfast  torsemide 20 milliGRAM(s) Oral every 12 hours    MEDICATIONS  (PRN):  acetaminophen   Tablet .. 650 milliGRAM(s) Oral every 6 hours PRN Temp greater or equal to 38C (100.4F), Mild Pain (1 - 3)  dextrose 40% Gel 15 Gram(s) Oral once PRN Blood Glucose LESS THAN 70 milliGRAM(s)/deciliter  glucagon  Injectable 1 milliGRAM(s) IntraMuscular once PRN Glucose LESS THAN 70 milligrams/deciliter  oxyCODONE    IR 5 milliGRAM(s) Oral every 6 hours PRN Moderate Pain (4 - 6)      RADIOLOGY & ADDITIONAL TESTS:      chest xray -   IMPRESSION: Lateral pleural effusions noted, right greater than left.   Underlying infiltrate and/or atelectasis cannot be excluded. Status post   CABG.

## 2018-12-01 NOTE — PROGRESS NOTE ADULT - ASSESSMENT
64 y/o female with PMHx of CAD s/p CABG x1 Stent placement x2,  PAD s/p balloon angioplasty, DM2, HTN, HLD came to the ED complaining of worsening dyspnea with minimal exertion and increasing abdominal girth over the past 2 weeks.  Echo with ef 65%, mild lvh, mod reduced rv function rsvp (mild pulm htn) presents with Cook, leg edema  Cat scan abdomen with small to moderate ascites (liver normal size albumin normal).     Problem/Plan - 1:  ·  Problem: Coronary artery disease involving native coronary artery of native heart without angina pectoris.  Plan: Stress test mildly positive study in inferior wall  Medical treatment for cad  c/w  Coreg, asa, Plavix and Lipitor  Lft just mildly elevated  would repeat and if worsens hold Lipitor.      Problem/Plan - 2:  ·  Problem: Acute on chronic diastolic congestive heart failure.  Plan: Echo with EF 65%  LVH no sig valvular disease mild pulm htn  Diastolic dysfunction with Right sided heart failure  increase Lasix and watch bun/creat closely  daily weights and intake and out put  Low na diet  check venous dopplers.      Problem/Plan - 3:  ·  Problem: Hyperkalemia.  Plan: With normal creat  decrease losartan to 50mg qd  Kayexalate x 1  repeat bun creat in the am.      Problem/Plan - 4:  ·  Problem: Ascites.  Plan: Likely secondary to right sided heart failure  consider tap if no improvement to differentiate ? Cardiac cirrhosis vs malignancy.     Hyperkalemia resolved:     RV dysfunction- most likely related to sleep apnea/obesity.   Nuclear stress test reviewed- inferior ischemia. Coronary angiogram reviewed. Non revascularized CAD. No intervention. Not the cause of CHF.    If diuresis limited may benefit from low dose  currently stable

## 2018-12-01 NOTE — PROGRESS NOTE ADULT - SUBJECTIVE AND OBJECTIVE BOX
Events noted: No acute complaints .Sao2 98% on RA bp near goal.      TELE: Not on tele    MEDICATIONS  (STANDING):  aspirin  chewable 81 milliGRAM(s) Oral daily  atorvastatin 80 milliGRAM(s) Oral at bedtime  carvedilol 25 milliGRAM(s) Oral every 12 hours  clopidogrel Tablet 75 milliGRAM(s) Oral daily  dextrose 5%. 1000 milliLiter(s) (50 mL/Hr) IV Continuous <Continuous>  dextrose 50% Injectable 12.5 Gram(s) IV Push once  dextrose 50% Injectable 25 Gram(s) IV Push once  dextrose 50% Injectable 25 Gram(s) IV Push once  docusate sodium 100 milliGRAM(s) Oral two times a day  DULoxetine 60 milliGRAM(s) Oral daily  enoxaparin Injectable 40 milliGRAM(s) SubCutaneous daily  gabapentin 100 milliGRAM(s) Oral three times a day  insulin glargine Injectable (LANTUS) 7 Unit(s) SubCutaneous at bedtime  insulin lispro (HumaLOG) corrective regimen sliding scale   SubCutaneous three times a day before meals  isosorbide   mononitrate ER Tablet (IMDUR) 60 milliGRAM(s) Oral daily  losartan 50 milliGRAM(s) Oral daily  nitroglycerin    Patch 0.4 mG/Hr(s) 1 patch Transdermal daily  pantoprazole    Tablet 40 milliGRAM(s) Oral before breakfast  torsemide 20 milliGRAM(s) Oral every 12 hours    MEDICATIONS  (PRN):  acetaminophen   Tablet .. 650 milliGRAM(s) Oral every 6 hours PRN Temp greater or equal to 38C (100.4F), Mild Pain (1 - 3)  dextrose 40% Gel 15 Gram(s) Oral once PRN Blood Glucose LESS THAN 70 milliGRAM(s)/deciliter  glucagon  Injectable 1 milliGRAM(s) IntraMuscular once PRN Glucose LESS THAN 70 milligrams/deciliter  oxyCODONE    IR 5 milliGRAM(s) Oral every 6 hours PRN Moderate Pain (4 - 6)      Allergies    Accupril (Other)    Intolerances      PAST MEDICAL & SURGICAL HISTORY:  Herniated disc, cervical  PAD (peripheral artery disease)  Legally blind  History of peripheral vascular disease  History of retinal detachment  History of CEA (carotid endarterectomy): Right  Hyperlipidemia  Glaucoma  Hypertension  Diabetes  S/P CABG x 1  After cataract  Uveitic glaucoma of both eyes  Detached retina  Atherosclerosis of right carotid artery   delivery delivered      Vital Signs Last 24 Hrs  T(C): 36.7 (01 Dec 2018 06:34), Max: 36.9 (2018 15:14)  T(F): 98 (01 Dec 2018 06:34), Max: 98.4 (2018 15:14)  HR: 67 (01 Dec 2018 06:34) (64 - 77)  BP: 134/74 (01 Dec 2018 06:34) (124/68 - 142/82)  BP(mean): --  RR: 18 (01 Dec 2018 06:34) (18 - 18)  SpO2: 100% (01 Dec 2018 06:34) (74% - 100%)    Physical Exam:  Constitutional: NAD, AAOx3  Cardiovascular: +S1S2 RRR  Pulmonary: CTA b/l, unlabored  Abd: soft NTND +BS  Groins: C/D/I bilaterally; no bleeding, hematoma, edema  Extremities: no pedal edema, +distal pulses b/l  Neuro: non focal, GAN x4    LABS:        135  |  96<L>  |  51.0<H>  ----------------------------<  129<H>  4.6   |  33.0<H>  |  1.03    Ca    8.6      01 Dec 2018 06:28  Mg     2.0                   Plan:     Access site care and activity limitations reviewed w/ pt.   Outpt f/up in 4-6 weeks.

## 2018-12-02 LAB
ANION GAP SERPL CALC-SCNC: 7 MMOL/L — SIGNIFICANT CHANGE UP (ref 5–17)
BASE EXCESS BLDA CALC-SCNC: 7.8 MMOL/L — HIGH (ref -2–2)
BLOOD GAS COMMENTS ARTERIAL: SIGNIFICANT CHANGE UP
BLOOD GAS COMMENTS ARTERIAL: SIGNIFICANT CHANGE UP
BUN SERPL-MCNC: 48 MG/DL — HIGH (ref 8–20)
CALCIUM SERPL-MCNC: 8.6 MG/DL — SIGNIFICANT CHANGE UP (ref 8.6–10.2)
CHLORIDE SERPL-SCNC: 97 MMOL/L — LOW (ref 98–107)
CO2 SERPL-SCNC: 34 MMOL/L — HIGH (ref 22–29)
CREAT SERPL-MCNC: 0.98 MG/DL — SIGNIFICANT CHANGE UP (ref 0.5–1.3)
GAS PNL BLDA: SIGNIFICANT CHANGE UP
GAS PNL BLDA: SIGNIFICANT CHANGE UP
GLUCOSE BLDC GLUCOMTR-MCNC: 121 MG/DL — HIGH (ref 70–99)
GLUCOSE BLDC GLUCOMTR-MCNC: 133 MG/DL — HIGH (ref 70–99)
GLUCOSE BLDC GLUCOMTR-MCNC: 174 MG/DL — HIGH (ref 70–99)
GLUCOSE BLDC GLUCOMTR-MCNC: 97 MG/DL — SIGNIFICANT CHANGE UP (ref 70–99)
GLUCOSE SERPL-MCNC: 113 MG/DL — SIGNIFICANT CHANGE UP (ref 70–115)
HCO3 BLDA-SCNC: 32 MMOL/L — HIGH (ref 20–26)
HOROWITZ INDEX BLDA+IHG-RTO: 0.21 — SIGNIFICANT CHANGE UP
HOROWITZ INDEX BLDA+IHG-RTO: 0.35 — SIGNIFICANT CHANGE UP
MAGNESIUM SERPL-MCNC: 1.9 MG/DL — SIGNIFICANT CHANGE UP (ref 1.8–2.6)
PCO2 BLDA: 65 MMHG — HIGH (ref 35–45)
PCO2 BLDA: 69 MMHG — HIGH (ref 35–45)
PH BLDA: 7.31 — LOW (ref 7.35–7.45)
PH BLDA: 7.34 — LOW (ref 7.35–7.45)
PHOSPHATE SERPL-MCNC: 3.7 MG/DL — SIGNIFICANT CHANGE UP (ref 2.4–4.7)
PO2 BLDA: 44 MMHG — CRITICAL LOW (ref 83–108)
PO2 BLDA: 91 MMHG — SIGNIFICANT CHANGE UP (ref 83–108)
POTASSIUM SERPL-MCNC: 4.4 MMOL/L — SIGNIFICANT CHANGE UP (ref 3.5–5.3)
POTASSIUM SERPL-SCNC: 4.4 MMOL/L — SIGNIFICANT CHANGE UP (ref 3.5–5.3)
SAO2 % BLDA: 74 % — LOW (ref 95–99)
SAO2 % BLDA: 97 % — SIGNIFICANT CHANGE UP (ref 95–99)
SODIUM SERPL-SCNC: 138 MMOL/L — SIGNIFICANT CHANGE UP (ref 135–145)

## 2018-12-02 PROCEDURE — 99233 SBSQ HOSP IP/OBS HIGH 50: CPT

## 2018-12-02 RX ADMIN — Medication 40 MILLIGRAM(S): at 02:58

## 2018-12-02 RX ADMIN — ISOSORBIDE MONONITRATE 60 MILLIGRAM(S): 60 TABLET, EXTENDED RELEASE ORAL at 15:22

## 2018-12-02 RX ADMIN — CARVEDILOL PHOSPHATE 25 MILLIGRAM(S): 80 CAPSULE, EXTENDED RELEASE ORAL at 06:01

## 2018-12-02 RX ADMIN — CARVEDILOL PHOSPHATE 25 MILLIGRAM(S): 80 CAPSULE, EXTENDED RELEASE ORAL at 17:20

## 2018-12-02 RX ADMIN — GABAPENTIN 100 MILLIGRAM(S): 400 CAPSULE ORAL at 06:01

## 2018-12-02 RX ADMIN — INSULIN GLARGINE 7 UNIT(S): 100 INJECTION, SOLUTION SUBCUTANEOUS at 21:42

## 2018-12-02 RX ADMIN — OXYCODONE HYDROCHLORIDE 5 MILLIGRAM(S): 5 TABLET ORAL at 08:44

## 2018-12-02 RX ADMIN — GABAPENTIN 100 MILLIGRAM(S): 400 CAPSULE ORAL at 21:42

## 2018-12-02 RX ADMIN — GABAPENTIN 100 MILLIGRAM(S): 400 CAPSULE ORAL at 14:29

## 2018-12-02 RX ADMIN — Medication 100 MILLIGRAM(S): at 06:01

## 2018-12-02 RX ADMIN — Medication 1 PATCH: at 19:40

## 2018-12-02 RX ADMIN — LOSARTAN POTASSIUM 50 MILLIGRAM(S): 100 TABLET, FILM COATED ORAL at 06:02

## 2018-12-02 RX ADMIN — CLOPIDOGREL BISULFATE 75 MILLIGRAM(S): 75 TABLET, FILM COATED ORAL at 14:29

## 2018-12-02 RX ADMIN — Medication 1 PATCH: at 12:16

## 2018-12-02 RX ADMIN — Medication 81 MILLIGRAM(S): at 14:29

## 2018-12-02 RX ADMIN — ATORVASTATIN CALCIUM 80 MILLIGRAM(S): 80 TABLET, FILM COATED ORAL at 21:42

## 2018-12-02 RX ADMIN — Medication 40 MILLIGRAM(S): at 14:29

## 2018-12-02 RX ADMIN — DULOXETINE HYDROCHLORIDE 60 MILLIGRAM(S): 30 CAPSULE, DELAYED RELEASE ORAL at 15:22

## 2018-12-02 RX ADMIN — OXYCODONE HYDROCHLORIDE 5 MILLIGRAM(S): 5 TABLET ORAL at 09:44

## 2018-12-02 RX ADMIN — OXYCODONE HYDROCHLORIDE 5 MILLIGRAM(S): 5 TABLET ORAL at 02:58

## 2018-12-02 RX ADMIN — OXYCODONE HYDROCHLORIDE 5 MILLIGRAM(S): 5 TABLET ORAL at 04:00

## 2018-12-02 RX ADMIN — Medication 1 PATCH: at 00:20

## 2018-12-02 RX ADMIN — Medication 100 MILLIGRAM(S): at 17:20

## 2018-12-02 RX ADMIN — PANTOPRAZOLE SODIUM 40 MILLIGRAM(S): 20 TABLET, DELAYED RELEASE ORAL at 06:01

## 2018-12-02 RX ADMIN — ENOXAPARIN SODIUM 40 MILLIGRAM(S): 100 INJECTION SUBCUTANEOUS at 12:16

## 2018-12-02 NOTE — PROGRESS NOTE ADULT - ASSESSMENT
1) Acute metabolic encephalopathy --> secondary to CO2 narcosis  --> will place on bipap   --> repeat abc at 3pm  --> spoke to daughter and was diagnosed with KYREE in the past but never followed up for a sleep study  --> will place pulmonary consult for am   --> may need triology on dc    2) acute on chronic diastolic heart failure --> cardio following  --> iv lasix bid    3) Ameena with elevated bun -->stable    4) H/o CAD s/p CABG and stent- Stable, no CP, nuclear stress positive.  C/w ASA/ Plavix/ Statins/ Imdur/ Coreg. Cardio on board- further plan per cardiology.     5) H/o PAD- s/p balloon angioplasty, C/w ASA/ Plavix/ Lipitor.     6) H/o HTN /HLD- Continue home medications     7) H/o DM-2 - C/w Lantus, LSS + FS monitoring. Hold home metformin.     8) Chronic lower backache- Pain meds prn.    9) dvt prop --> lovenox sub q    spoke to daughter about the plan       3)

## 2018-12-02 NOTE — DIETITIAN INITIAL EVALUATION ADULT. - OTHER INFO
Per EMR pt has been with good po intake, completing 100% of meals however pt is on BIPAP this morning and did not eat her breakfast tray.

## 2018-12-02 NOTE — PROGRESS NOTE ADULT - SUBJECTIVE AND OBJECTIVE BOX
KUN ROCHA    [id number]    63y      Female    INTERVAL HPI/OVERNIGHT EVENTS:    patient being seen for acute on chronic diastolic hf, pleural effusions and med management. patient seen at bedside and is more lethargic but able to answer questions     abg completed and placed on bipap.      REVIEW OF SYSTEMS:    CONSTITUTIONAL: No fever, weight loss, or fatigue  RESPIRATORY: No cough, wheezing, hemoptysis; No shortness of breath  CARDIOVASCULAR: No chest pain, palpitations  GASTROINTESTINAL: No abdominal or epigastric pain. No nausea, vomiting  NEUROLOGICAL: No headaches, memory loss, loss of strength.  MISCELLANEOUS:      Vital Signs Last 24 Hrs  T(C): 36.5 (02 Dec 2018 05:10), Max: 36.8 (01 Dec 2018 16:12)  T(F): 97.7 (02 Dec 2018 05:10), Max: 98.2 (01 Dec 2018 16:12)  HR: 66 (02 Dec 2018 11:32) (64 - 77)  BP: 136/84 (02 Dec 2018 11:32) (100/66 - 172/84)  BP(mean): --  RR: 16 (02 Dec 2018 11:32) (16 - 18)  SpO2: 100% (02 Dec 2018 11:32) (93% - 100%)    PHYSICAL EXAM:    GENERAL: lethargic but awake,   CHEST/LUNG: diminished,   HEART: S1S2+, Regular rate and rhythm; No murmurs.  ABDOMEN: Soft, Nontender, Nondistended; Bowel sounds present.  Extremities: trace pedal edema, right toe amputation. old foot wound.  NEURO: AAOX3,   PSYCH: normal mood.    LABS:    12-02    138  |  97<L>  |  48.0<H>  ----------------------------<  113  4.4   |  34.0<H>  |  0.98    Ca    8.6      02 Dec 2018 08:54  Phos  3.7     12-02  Mg     1.9     12-02        MEDICATIONS  (STANDING):  aspirin  chewable 81 milliGRAM(s) Oral daily  atorvastatin 80 milliGRAM(s) Oral at bedtime  carvedilol 25 milliGRAM(s) Oral every 12 hours  clopidogrel Tablet 75 milliGRAM(s) Oral daily  dextrose 5%. 1000 milliLiter(s) (50 mL/Hr) IV Continuous <Continuous>  dextrose 50% Injectable 12.5 Gram(s) IV Push once  dextrose 50% Injectable 25 Gram(s) IV Push once  dextrose 50% Injectable 25 Gram(s) IV Push once  docusate sodium 100 milliGRAM(s) Oral two times a day  DULoxetine 60 milliGRAM(s) Oral daily  enoxaparin Injectable 40 milliGRAM(s) SubCutaneous daily  furosemide   Injectable 40 milliGRAM(s) IV Push every 12 hours  gabapentin 100 milliGRAM(s) Oral three times a day  insulin glargine Injectable (LANTUS) 7 Unit(s) SubCutaneous at bedtime  insulin lispro (HumaLOG) corrective regimen sliding scale   SubCutaneous three times a day before meals  isosorbide   mononitrate ER Tablet (IMDUR) 60 milliGRAM(s) Oral daily  losartan 50 milliGRAM(s) Oral daily  nitroglycerin    Patch 0.4 mG/Hr(s) 1 patch Transdermal daily  pantoprazole    Tablet 40 milliGRAM(s) Oral before breakfast    MEDICATIONS  (PRN):  acetaminophen   Tablet .. 650 milliGRAM(s) Oral every 6 hours PRN Temp greater or equal to 38C (100.4F), Mild Pain (1 - 3)  dextrose 40% Gel 15 Gram(s) Oral once PRN Blood Glucose LESS THAN 70 milliGRAM(s)/deciliter  glucagon  Injectable 1 milliGRAM(s) IntraMuscular once PRN Glucose LESS THAN 70 milligrams/deciliter  oxyCODONE    IR 5 milliGRAM(s) Oral every 6 hours PRN Moderate Pain (4 - 6)      RADIOLOGY & ADDITIONAL TESTS:    7.31/69/44

## 2018-12-03 LAB
ANION GAP SERPL CALC-SCNC: 8 MMOL/L — SIGNIFICANT CHANGE UP (ref 5–17)
BASE EXCESS BLDA CALC-SCNC: 10.8 MMOL/L — HIGH (ref -2–2)
BLOOD GAS COMMENTS ARTERIAL: SIGNIFICANT CHANGE UP
BUN SERPL-MCNC: 52 MG/DL — HIGH (ref 8–20)
CALCIUM SERPL-MCNC: 8.6 MG/DL — SIGNIFICANT CHANGE UP (ref 8.6–10.2)
CHLORIDE SERPL-SCNC: 97 MMOL/L — LOW (ref 98–107)
CO2 SERPL-SCNC: 34 MMOL/L — HIGH (ref 22–29)
CREAT SERPL-MCNC: 1.06 MG/DL — SIGNIFICANT CHANGE UP (ref 0.5–1.3)
GAS PNL BLDA: SIGNIFICANT CHANGE UP
GLUCOSE BLDC GLUCOMTR-MCNC: 128 MG/DL — HIGH (ref 70–99)
GLUCOSE BLDC GLUCOMTR-MCNC: 174 MG/DL — HIGH (ref 70–99)
GLUCOSE BLDC GLUCOMTR-MCNC: 213 MG/DL — HIGH (ref 70–99)
GLUCOSE BLDC GLUCOMTR-MCNC: 238 MG/DL — HIGH (ref 70–99)
GLUCOSE SERPL-MCNC: 120 MG/DL — HIGH (ref 70–115)
HCO3 BLDA-SCNC: 34 MMOL/L — HIGH (ref 20–26)
HOROWITZ INDEX BLDA+IHG-RTO: SIGNIFICANT CHANGE UP
MAGNESIUM SERPL-MCNC: 2 MG/DL — SIGNIFICANT CHANGE UP (ref 1.8–2.6)
PCO2 BLDA: 66 MMHG — HIGH (ref 35–45)
PH BLDA: 7.37 — SIGNIFICANT CHANGE UP (ref 7.35–7.45)
PO2 BLDA: 76 MMHG — LOW (ref 83–108)
POTASSIUM SERPL-MCNC: 4.8 MMOL/L — SIGNIFICANT CHANGE UP (ref 3.5–5.3)
POTASSIUM SERPL-SCNC: 4.8 MMOL/L — SIGNIFICANT CHANGE UP (ref 3.5–5.3)
SAO2 % BLDA: 95 % — SIGNIFICANT CHANGE UP (ref 95–99)
SODIUM SERPL-SCNC: 139 MMOL/L — SIGNIFICANT CHANGE UP (ref 135–145)

## 2018-12-03 PROCEDURE — 99222 1ST HOSP IP/OBS MODERATE 55: CPT

## 2018-12-03 PROCEDURE — 99232 SBSQ HOSP IP/OBS MODERATE 35: CPT

## 2018-12-03 RX ADMIN — GABAPENTIN 100 MILLIGRAM(S): 400 CAPSULE ORAL at 21:21

## 2018-12-03 RX ADMIN — CARVEDILOL PHOSPHATE 25 MILLIGRAM(S): 80 CAPSULE, EXTENDED RELEASE ORAL at 06:16

## 2018-12-03 RX ADMIN — Medication 100 MILLIGRAM(S): at 06:16

## 2018-12-03 RX ADMIN — Medication 1: at 11:36

## 2018-12-03 RX ADMIN — Medication 100 MILLIGRAM(S): at 17:11

## 2018-12-03 RX ADMIN — Medication 1 PATCH: at 21:19

## 2018-12-03 RX ADMIN — ISOSORBIDE MONONITRATE 60 MILLIGRAM(S): 60 TABLET, EXTENDED RELEASE ORAL at 11:36

## 2018-12-03 RX ADMIN — ENOXAPARIN SODIUM 40 MILLIGRAM(S): 100 INJECTION SUBCUTANEOUS at 11:37

## 2018-12-03 RX ADMIN — INSULIN GLARGINE 7 UNIT(S): 100 INJECTION, SOLUTION SUBCUTANEOUS at 21:21

## 2018-12-03 RX ADMIN — PANTOPRAZOLE SODIUM 40 MILLIGRAM(S): 20 TABLET, DELAYED RELEASE ORAL at 06:16

## 2018-12-03 RX ADMIN — Medication 1 PATCH: at 00:11

## 2018-12-03 RX ADMIN — Medication 1 PATCH: at 12:32

## 2018-12-03 RX ADMIN — Medication 2: at 17:11

## 2018-12-03 RX ADMIN — Medication 40 MILLIGRAM(S): at 14:58

## 2018-12-03 RX ADMIN — CARVEDILOL PHOSPHATE 25 MILLIGRAM(S): 80 CAPSULE, EXTENDED RELEASE ORAL at 17:11

## 2018-12-03 RX ADMIN — ATORVASTATIN CALCIUM 80 MILLIGRAM(S): 80 TABLET, FILM COATED ORAL at 21:20

## 2018-12-03 RX ADMIN — Medication 40 MILLIGRAM(S): at 01:57

## 2018-12-03 RX ADMIN — GABAPENTIN 100 MILLIGRAM(S): 400 CAPSULE ORAL at 14:58

## 2018-12-03 RX ADMIN — LOSARTAN POTASSIUM 50 MILLIGRAM(S): 100 TABLET, FILM COATED ORAL at 06:16

## 2018-12-03 RX ADMIN — GABAPENTIN 100 MILLIGRAM(S): 400 CAPSULE ORAL at 06:16

## 2018-12-03 RX ADMIN — Medication 81 MILLIGRAM(S): at 11:36

## 2018-12-03 RX ADMIN — CLOPIDOGREL BISULFATE 75 MILLIGRAM(S): 75 TABLET, FILM COATED ORAL at 11:36

## 2018-12-03 RX ADMIN — DULOXETINE HYDROCHLORIDE 60 MILLIGRAM(S): 30 CAPSULE, DELAYED RELEASE ORAL at 11:36

## 2018-12-03 NOTE — PROGRESS NOTE ADULT - PROBLEM SELECTOR PLAN 1
RV dysfunction. Unclear volume status. Will plan for RHC tomorrow and measure wedge pressure to get understanding of RV pressures and filling pressures.   Can continue IV lasix for now.

## 2018-12-03 NOTE — PROGRESS NOTE ADULT - ASSESSMENT
1) Acute metabolic encephalopathy --> secondary to CO2 narcosis  -->improved  --> pulm consult appreciated  --> spoke to daughter and was diagnosed with KYREE in the past but never followed up for a sleep study    2) acute on chronic diastolic heart failure --> cardio following  --> iv lasix bid    3) Ameena with elevated bun -->stable    4) H/o CAD s/p CABG and stent- Stable, no CP, nuclear stress positive.  C/w ASA/ Plavix/ Statins/ Imdur/ Coreg. Cardio on board- further plan per cardiology.     5) H/o PAD- s/p balloon angioplasty, C/w ASA/ Plavix/ Lipitor.     6) H/o HTN /HLD- Continue home medications     7) H/o DM-2 - C/w Lantus, LSS + FS monitoring. Hold home metformin.     8) Chronic lower backache- Pain meds prn.    9) dvt prop --> lovenox sub q

## 2018-12-03 NOTE — PROGRESS NOTE ADULT - SUBJECTIVE AND OBJECTIVE BOX
KUN ROCHA    [id number]    63y      Female    INTERVAL HPI/OVERNIGHT EVENTS:    patient being seen for acute on chronic diastolic hf, pleural effusions and med management. patient seen at bedside and is much more awake and alert.     as per nurse, patient used bipap last night and states feeling more energetic and feels less sob.       REVIEW OF SYSTEMS:    CONSTITUTIONAL: No fever, weight loss, or fatigue  RESPIRATORY: No cough, wheezing, hemoptysis; No shortness of breath  CARDIOVASCULAR: No chest pain, palpitations  GASTROINTESTINAL: No abdominal or epigastric pain. No nausea, vomiting  NEUROLOGICAL: No headaches, memory loss, loss of strength.  MISCELLANEOUS:      Vital Signs Last 24 Hrs  T(C): 36.6 (03 Dec 2018 11:34), Max: 37 (02 Dec 2018 14:23)  T(F): 97.9 (03 Dec 2018 11:34), Max: 98.6 (02 Dec 2018 14:23)  HR: 62 (03 Dec 2018 11:34) (61 - 70)  BP: 132/66 (03 Dec 2018 11:34) (122/56 - 145/72)  BP(mean): --  RR: 16 (03 Dec 2018 11:34) (16 - 16)  SpO2: 100% (03 Dec 2018 11:47) (98% - 100%)    PHYSICAL EXAM:    GENERAL: more awake,   CHEST/LUNG: diminished   HEART: S1S2+, Regular rate and rhythm; No murmurs.  ABDOMEN: Soft, Nontender, Nondistended; Bowel sounds present.  Extremities: trace pedal edema, right toe amputation. old foot wound.  NEURO: AAOX3,   PSYCH: normal mood.      LABS:    12-03    139  |  97<L>  |  52.0<H>  ----------------------------<  120<H>  4.8   |  34.0<H>  |  1.06    Ca    8.6      03 Dec 2018 07:10  Phos  3.7     12-02  Mg     2.0     12-03              MEDICATIONS  (STANDING):  aspirin  chewable 81 milliGRAM(s) Oral daily  atorvastatin 80 milliGRAM(s) Oral at bedtime  carvedilol 25 milliGRAM(s) Oral every 12 hours  clopidogrel Tablet 75 milliGRAM(s) Oral daily  dextrose 5%. 1000 milliLiter(s) (50 mL/Hr) IV Continuous <Continuous>  dextrose 50% Injectable 12.5 Gram(s) IV Push once  dextrose 50% Injectable 25 Gram(s) IV Push once  dextrose 50% Injectable 25 Gram(s) IV Push once  docusate sodium 100 milliGRAM(s) Oral two times a day  DULoxetine 60 milliGRAM(s) Oral daily  enoxaparin Injectable 40 milliGRAM(s) SubCutaneous daily  furosemide   Injectable 40 milliGRAM(s) IV Push every 12 hours  gabapentin 100 milliGRAM(s) Oral three times a day  insulin glargine Injectable (LANTUS) 7 Unit(s) SubCutaneous at bedtime  insulin lispro (HumaLOG) corrective regimen sliding scale   SubCutaneous three times a day before meals  isosorbide   mononitrate ER Tablet (IMDUR) 60 milliGRAM(s) Oral daily  losartan 50 milliGRAM(s) Oral daily  nitroglycerin    Patch 0.4 mG/Hr(s) 1 patch Transdermal daily  pantoprazole    Tablet 40 milliGRAM(s) Oral before breakfast    MEDICATIONS  (PRN):  acetaminophen   Tablet .. 650 milliGRAM(s) Oral every 6 hours PRN Temp greater or equal to 38C (100.4F), Mild Pain (1 - 3)  dextrose 40% Gel 15 Gram(s) Oral once PRN Blood Glucose LESS THAN 70 milliGRAM(s)/deciliter  glucagon  Injectable 1 milliGRAM(s) IntraMuscular once PRN Glucose LESS THAN 70 milligrams/deciliter  oxyCODONE    IR 5 milliGRAM(s) Oral every 6 hours PRN Moderate Pain (4 - 6)      RADIOLOGY & ADDITIONAL TESTS:

## 2018-12-03 NOTE — PROGRESS NOTE ADULT - SUBJECTIVE AND OBJECTIVE BOX
[facility]                                                        Office: [address] [address]                                                       Telephone: [phone]. Fax: [phone]      CC: Shortness of breath    INTERVAL HISTORY: improved shortness of breath.     MEDICATIONS  (STANDING):  aspirin  chewable 81 milliGRAM(s) Oral daily  atorvastatin 80 milliGRAM(s) Oral at bedtime  carvedilol 25 milliGRAM(s) Oral every 12 hours  clopidogrel Tablet 75 milliGRAM(s) Oral daily  dextrose 5%. 1000 milliLiter(s) (50 mL/Hr) IV Continuous <Continuous>  dextrose 50% Injectable 12.5 Gram(s) IV Push once  dextrose 50% Injectable 25 Gram(s) IV Push once  dextrose 50% Injectable 25 Gram(s) IV Push once  docusate sodium 100 milliGRAM(s) Oral two times a day  DULoxetine 60 milliGRAM(s) Oral daily  enoxaparin Injectable 40 milliGRAM(s) SubCutaneous daily  furosemide   Injectable 40 milliGRAM(s) IV Push every 12 hours  gabapentin 100 milliGRAM(s) Oral three times a day  insulin glargine Injectable (LANTUS) 7 Unit(s) SubCutaneous at bedtime  insulin lispro (HumaLOG) corrective regimen sliding scale   SubCutaneous three times a day before meals  isosorbide   mononitrate ER Tablet (IMDUR) 60 milliGRAM(s) Oral daily  losartan 50 milliGRAM(s) Oral daily  nitroglycerin    Patch 0.4 mG/Hr(s) 1 patch Transdermal daily  pantoprazole    Tablet 40 milliGRAM(s) Oral before breakfast    ROS: All others negative     PHYSICAL EXAM:  T(C): 36.6 (12-03-18 @ 11:34), Max: 36.6 (12-03-18 @ 11:34)  HR: 62 (12-03-18 @ 11:34) (61 - 66)  BP: 132/66 (12-03-18 @ 11:34) (122/56 - 144/69)  RR: 16 (12-03-18 @ 11:34) (16 - 16)  SpO2: 100% (12-03-18 @ 11:47) (98% - 100%)  Wt(kg): --  I&O's Summary    02 Dec 2018 07:01  -  03 Dec 2018 07:00  --------------------------------------------------------  IN: 200 mL / OUT: 0 mL / NET: 200 mL    03 Dec 2018 07:01  -  03 Dec 2018 14:50  --------------------------------------------------------  IN: 720 mL / OUT: 2 mL / NET: 718 mL      Appearance: Normal	  HEENT:   limited vision.   Lymphatic: No lymphadenopathy  Cardiovascular: Normal S1 S2, No JVD, No murmurs, No edema  Respiratory: Lungs clear to auscultation	  Psychiatry: A & O x 3, Mood & affect appropriate  Gastrointestinal:  Soft, Non-tender, + BS	  Skin: No rashes, No ecchymoses, No cyanosis  Neurologic: Non-focal  Extremities: Normal range of motion, No clubbing, cyanosis or edema  Vascular: Peripheral pulses palpable 2+ bilaterally    TELEMETRY: 	      LABS:	 	    12-03    139  |  97<L>  |  52.0<H>  ----------------------------<  120<H>  4.8   |  34.0<H>  |  1.06    Ca    8.6      03 Dec 2018 07:10  Phos  3.7     12-02  Mg     2.0     12-03

## 2018-12-04 LAB
GLUCOSE BLDC GLUCOMTR-MCNC: 154 MG/DL — HIGH (ref 70–99)
GLUCOSE BLDC GLUCOMTR-MCNC: 171 MG/DL — HIGH (ref 70–99)
GLUCOSE BLDC GLUCOMTR-MCNC: 193 MG/DL — HIGH (ref 70–99)
GLUCOSE BLDC GLUCOMTR-MCNC: 197 MG/DL — HIGH (ref 70–99)

## 2018-12-04 PROCEDURE — 99232 SBSQ HOSP IP/OBS MODERATE 35: CPT | Mod: 25

## 2018-12-04 PROCEDURE — 99232 SBSQ HOSP IP/OBS MODERATE 35: CPT

## 2018-12-04 PROCEDURE — 76937 US GUIDE VASCULAR ACCESS: CPT | Mod: 26

## 2018-12-04 PROCEDURE — 93451 RIGHT HEART CATH: CPT | Mod: 26

## 2018-12-04 RX ORDER — DOBUTAMINE HCL 250MG/20ML
1 VIAL (ML) INTRAVENOUS
Qty: 500 | Refills: 0 | Status: DISCONTINUED | OUTPATIENT
Start: 2018-12-04 | End: 2018-12-07

## 2018-12-04 RX ORDER — DOBUTAMINE HCL 250MG/20ML
3 VIAL (ML) INTRAVENOUS
Qty: 500 | Refills: 0 | Status: DISCONTINUED | OUTPATIENT
Start: 2018-12-04 | End: 2018-12-04

## 2018-12-04 RX ORDER — LACTULOSE 10 G/15ML
10 SOLUTION ORAL THREE TIMES A DAY
Qty: 0 | Refills: 0 | Status: DISCONTINUED | OUTPATIENT
Start: 2018-12-04 | End: 2018-12-26

## 2018-12-04 RX ADMIN — LACTULOSE 10 GRAM(S): 10 SOLUTION ORAL at 10:39

## 2018-12-04 RX ADMIN — CARVEDILOL PHOSPHATE 25 MILLIGRAM(S): 80 CAPSULE, EXTENDED RELEASE ORAL at 05:37

## 2018-12-04 RX ADMIN — LOSARTAN POTASSIUM 50 MILLIGRAM(S): 100 TABLET, FILM COATED ORAL at 05:37

## 2018-12-04 RX ADMIN — Medication 1: at 16:52

## 2018-12-04 RX ADMIN — Medication 1 PATCH: at 21:30

## 2018-12-04 RX ADMIN — Medication 1 PATCH: at 00:30

## 2018-12-04 RX ADMIN — Medication 100 MILLIGRAM(S): at 05:37

## 2018-12-04 RX ADMIN — CLOPIDOGREL BISULFATE 75 MILLIGRAM(S): 75 TABLET, FILM COATED ORAL at 11:34

## 2018-12-04 RX ADMIN — OXYCODONE HYDROCHLORIDE 5 MILLIGRAM(S): 5 TABLET ORAL at 00:47

## 2018-12-04 RX ADMIN — GABAPENTIN 100 MILLIGRAM(S): 400 CAPSULE ORAL at 21:54

## 2018-12-04 RX ADMIN — ISOSORBIDE MONONITRATE 60 MILLIGRAM(S): 60 TABLET, EXTENDED RELEASE ORAL at 11:34

## 2018-12-04 RX ADMIN — CARVEDILOL PHOSPHATE 25 MILLIGRAM(S): 80 CAPSULE, EXTENDED RELEASE ORAL at 17:53

## 2018-12-04 RX ADMIN — DULOXETINE HYDROCHLORIDE 60 MILLIGRAM(S): 30 CAPSULE, DELAYED RELEASE ORAL at 11:34

## 2018-12-04 RX ADMIN — GABAPENTIN 100 MILLIGRAM(S): 400 CAPSULE ORAL at 05:37

## 2018-12-04 RX ADMIN — Medication 1: at 07:58

## 2018-12-04 RX ADMIN — Medication 81 MILLIGRAM(S): at 11:36

## 2018-12-04 RX ADMIN — Medication 40 MILLIGRAM(S): at 02:48

## 2018-12-04 RX ADMIN — INSULIN GLARGINE 7 UNIT(S): 100 INJECTION, SOLUTION SUBCUTANEOUS at 21:54

## 2018-12-04 RX ADMIN — ENOXAPARIN SODIUM 40 MILLIGRAM(S): 100 INJECTION SUBCUTANEOUS at 11:35

## 2018-12-04 RX ADMIN — Medication 1 PATCH: at 11:34

## 2018-12-04 RX ADMIN — Medication 40 MILLIGRAM(S): at 16:42

## 2018-12-04 RX ADMIN — ATORVASTATIN CALCIUM 80 MILLIGRAM(S): 80 TABLET, FILM COATED ORAL at 21:54

## 2018-12-04 RX ADMIN — Medication 1: at 11:34

## 2018-12-04 RX ADMIN — Medication 1 PATCH: at 19:30

## 2018-12-04 RX ADMIN — Medication 2.92 MICROGRAM(S)/KG/MIN: at 18:32

## 2018-12-04 RX ADMIN — PANTOPRAZOLE SODIUM 40 MILLIGRAM(S): 20 TABLET, DELAYED RELEASE ORAL at 05:37

## 2018-12-04 NOTE — PROGRESS NOTE ADULT - SUBJECTIVE AND OBJECTIVE BOX
Nurse Practitioner Progress note:   s/p RHC revealing Wedge pressure 30 and PAP's 70's (verbal report from Dr Recio)      Patient feels well.  Denies chest pain, shortness of breath, dizziness or palpitations at this time    Patient A&O x 3  Lungs distant  S1S2   Tele shows SR  Right IJ site with DSD in place.  Procedure site CDI, no bleeding, no hematoma.      T(C): 36.9 (12-04-18 @ 11:13), Max: 36.9 (12-04-18 @ 11:13)  HR: 63 (12-04-18 @ 13:20) (60 - 89)  BP: 118/60 (12-04-18 @ 13:20) (118/60 - 127/65)  RR: 18 (12-04-18 @ 13:20) (18 - 18)  SpO2: 96% (12-04-18 @ 13:20) (95% - 100%)    12-03    139  |  97<L>  |  52.0<H>  ----------------------------<  120<H>  4.8   |  34.0<H>  |  1.06    Ca    8.6      03 Dec 2018 07:10  Mg     2.0     12-03              MEDICATIONS  (STANDING):  aspirin  chewable 81 milliGRAM(s) Oral daily  atorvastatin 80 milliGRAM(s) Oral at bedtime  carvedilol 25 milliGRAM(s) Oral every 12 hours  clopidogrel Tablet 75 milliGRAM(s) Oral daily  dextrose 5%. 1000 milliLiter(s) (50 mL/Hr) IV Continuous <Continuous>  dextrose 50% Injectable 12.5 Gram(s) IV Push once  dextrose 50% Injectable 25 Gram(s) IV Push once  dextrose 50% Injectable 25 Gram(s) IV Push once  docusate sodium 100 milliGRAM(s) Oral two times a day  DULoxetine 60 milliGRAM(s) Oral daily  enoxaparin Injectable 40 milliGRAM(s) SubCutaneous daily  furosemide   Injectable 40 milliGRAM(s) IV Push every 12 hours  gabapentin 100 milliGRAM(s) Oral three times a day  insulin glargine Injectable (LANTUS) 7 Unit(s) SubCutaneous at bedtime  insulin lispro (HumaLOG) corrective regimen sliding scale   SubCutaneous three times a day before meals  isosorbide   mononitrate ER Tablet (IMDUR) 60 milliGRAM(s) Oral daily  losartan 50 milliGRAM(s) Oral daily  nitroglycerin    Patch 0.4 mG/Hr(s) 1 patch Transdermal daily  pantoprazole    Tablet 40 milliGRAM(s) Oral before breakfast    MEDICATIONS  (PRN):  acetaminophen   Tablet .. 650 milliGRAM(s) Oral every 6 hours PRN Temp greater or equal to 38C (100.4F), Mild Pain (1 - 3)  bisacodyl Suppository 10 milliGRAM(s) Rectal daily PRN Constipation  dextrose 40% Gel 15 Gram(s) Oral once PRN Blood Glucose LESS THAN 70 milliGRAM(s)/deciliter  glucagon  Injectable 1 milliGRAM(s) IntraMuscular once PRN Glucose LESS THAN 70 milligrams/deciliter  lactulose Syrup 10 Gram(s) Oral three times a day PRN colnstipaiton        HPI:  62 y/o female with PMHx of CAD s/p CABG x1 Stent placement x2,  PAD s/p balloon angioplasty, DM2, HTN, HLD came to the ED complaining of worsening dyspnea with minimal exertion and increasing abdominal girth over the past 2 weeks. She usually ambulate with walker since R foot heel & 3 toes amputation last December without difficulty but lately has been having difficulty ambulating due to shortness of breath. She also reported generalized pain in her abdomen radiating to the left flank due to swelling in her abdomen. She has no chest pain, palpitation, orthopnea, cough, fever, chills, change in bowel/urinary habit, nausea, vomiting, recent travel, calf pain. Patient endorsed compliance to medication and diet. (25 Nov 2018 22:56)    now s/p [facility]     ASSESSMENT/PLAN:    CHF   -Admit to 4 tower  -VS, labs, diet, activity as per PCI orders  -Dobutamine at 3 mcg  -Lasix 40 mg BID

## 2018-12-04 NOTE — PROGRESS NOTE ADULT - SUBJECTIVE AND OBJECTIVE BOX
Patient seen at bedside  Patient alert and orientated x 3  Lungs CTA   Mallampati III, ASA 3  S1S2  Tele: Sinus Rhythm    Plan   RHC to assess for right heart pressures

## 2018-12-04 NOTE — PROGRESS NOTE ADULT - ASSESSMENT
Assess    Chronic Hypercarbic Ventilatory failure related to restrictive pulmonary impairment  Inadequate response to Bipap  Related diastolic dysfunction with pulmonary hypertension  The patient will require NIV at home to lower her PCO2 and to avoid hospitalizations    Plan    Bipap for now  02  Discussed NIV with discharge planning  SINGH or Placement likely required so need for home NIV unclear Assess    Chronic Hypercarbic Ventilatory failure related to restrictive pulmonary impairment  Inadequate response to Bipap  Related diastolic dysfunction with pulmonary hypertension  The patient will require NIV at home to lower her PCO2 and to avoid hospitalizations    Plan    Bipap for now  02  Discussed NIV with discharge planning  Cath per cardio  SINGH or Placement likely required so need for home NIV unclear

## 2018-12-04 NOTE — PROGRESS NOTE ADULT - ASSESSMENT
1) Acute metabolic encephalopathy --> secondary to CO2 narcosis  -->resolved  --> pulm following  --> spoke to daughter and was diagnosed with KYREE in the past but never followed up for a sleep study  --> will need triology on dc     2) acute on chronic diastolic heart failure --> cardio following  --> iv lasix bid  --> for rhc today     3) Ameena with elevated bun -->stable    4) H/o CAD s/p CABG and stent- Stable,    5) H/o PAD- s/p balloon angioplasty in the past  --> , C/w ASA/ Plavix/ Lipitor.     6) H/o HTN /HLD- Continue home medications     7) H/o DM-2 - C/w Lantus, LSS + FS monitoring. Hold home metformin.     8) Chronic lower backache- Pain meds prn.    9) dvt prop --> lovenox sub q

## 2018-12-04 NOTE — PROGRESS NOTE ADULT - SUBJECTIVE AND OBJECTIVE BOX
KUN ROCHA    [id number]    63y      Female    INTERVAL HPI/OVERNIGHT EVENTS:    64 y/o female with PMHx of CAD s/p CABG x1 Stent placement x2,  PAD s/p balloon angioplasty, DM2, HTN, HLD came to the ED complaining of worsening dyspnea with minimal exertion and increasing abdominal girth over the past 2 weeks prior to admission. She usually ambulate with walker since R foot heel & 3 toes amputation last December without difficulty but lately has been having difficulty ambulating due to shortness of breath.     patient admitted to [facility] and seen by cardio in consult. patient started on iv diuretics and had tte which showed preserved ef and diastolic hf. patient transitioned to po torsemide bid and was going to be discharged but desaturated and discharge was postponed. Patient then became confused and abg performed which showed elevated co2 and placed on bipap with improvement of mentation. Pulmonary was consulted and sugar calderon need triology on dc    Patient is planned for rhc today. Patient seen at bedside and denies any complaints and states feeling less sob.       REVIEW OF SYSTEMS:    CONSTITUTIONAL: No fever, weight loss, or fatigue  RESPIRATORY: No cough, wheezing, hemoptysis; No shortness of breath  CARDIOVASCULAR: No chest pain, palpitations  GASTROINTESTINAL: No abdominal or epigastric pain. No nausea, vomiting  NEUROLOGICAL: No headaches, memory loss, loss of strength.  MISCELLANEOUS:      Vital Signs Last 24 Hrs  T(C): 36.4 (04 Dec 2018 04:55), Max: 36.6 (03 Dec 2018 11:34)  T(F): 97.6 (04 Dec 2018 04:55), Max: 97.9 (03 Dec 2018 11:34)  HR: 89 (04 Dec 2018 04:55) (60 - 89)  BP: 119/63 (04 Dec 2018 04:55) (119/63 - 132/66)  BP(mean): --  RR: 18 (04 Dec 2018 04:55) (16 - 18)  SpO2: 100% (04 Dec 2018 07:35) (95% - 100%)    PHYSICAL EXAM:    GENERAL: NAD   CHEST/LUNG: diminished   HEART: S1S2+, Regular rate and rhythm; No murmurs.  ABDOMEN: Soft, Nontender, Nondistended; Bowel sounds present.  Extremities: trace pedal edema, right toe amputation. old foot wound.  NEURO: AAOX3,   PSYCH: normal mood.        LABS:    12-03    139  |  97<L>  |  52.0<H>  ----------------------------<  120<H>  4.8   |  34.0<H>  |  1.06    Ca    8.6      03 Dec 2018 07:10  Mg     2.0     12-03              MEDICATIONS  (STANDING):  aspirin  chewable 81 milliGRAM(s) Oral daily  atorvastatin 80 milliGRAM(s) Oral at bedtime  carvedilol 25 milliGRAM(s) Oral every 12 hours  clopidogrel Tablet 75 milliGRAM(s) Oral daily  dextrose 5%. 1000 milliLiter(s) (50 mL/Hr) IV Continuous <Continuous>  dextrose 50% Injectable 12.5 Gram(s) IV Push once  dextrose 50% Injectable 25 Gram(s) IV Push once  dextrose 50% Injectable 25 Gram(s) IV Push once  docusate sodium 100 milliGRAM(s) Oral two times a day  DULoxetine 60 milliGRAM(s) Oral daily  enoxaparin Injectable 40 milliGRAM(s) SubCutaneous daily  furosemide   Injectable 40 milliGRAM(s) IV Push every 12 hours  gabapentin 100 milliGRAM(s) Oral three times a day  insulin glargine Injectable (LANTUS) 7 Unit(s) SubCutaneous at bedtime  insulin lispro (HumaLOG) corrective regimen sliding scale   SubCutaneous three times a day before meals  isosorbide   mononitrate ER Tablet (IMDUR) 60 milliGRAM(s) Oral daily  losartan 50 milliGRAM(s) Oral daily  nitroglycerin    Patch 0.4 mG/Hr(s) 1 patch Transdermal daily  pantoprazole    Tablet 40 milliGRAM(s) Oral before breakfast    MEDICATIONS  (PRN):  acetaminophen   Tablet .. 650 milliGRAM(s) Oral every 6 hours PRN Temp greater or equal to 38C (100.4F), Mild Pain (1 - 3)  bisacodyl Suppository 10 milliGRAM(s) Rectal daily PRN Constipation  dextrose 40% Gel 15 Gram(s) Oral once PRN Blood Glucose LESS THAN 70 milliGRAM(s)/deciliter  glucagon  Injectable 1 milliGRAM(s) IntraMuscular once PRN Glucose LESS THAN 70 milligrams/deciliter  lactulose Syrup 10 Gram(s) Oral three times a day PRN colnstipaiton      RADIOLOGY & ADDITIONAL TESTS:

## 2018-12-04 NOTE — PROGRESS NOTE ADULT - SUBJECTIVE AND OBJECTIVE BOX
PULMONARY Progress NOTE      RANJIT [id number]    Patient is a 63y old  Female who presents with a chief complaint of Shortness of breath (02 Dec 2018 14:09)      HISTORY OF PRESENT ILLNESS:    Hypercarbia  Obesity  Restrictive pulmonary impairment  Diastolic HF  Effusions    MEDICATIONS  (STANDING):  aspirin  chewable 81 milliGRAM(s) Oral daily  atorvastatin 80 milliGRAM(s) Oral at bedtime  carvedilol 25 milliGRAM(s) Oral every 12 hours  clopidogrel Tablet 75 milliGRAM(s) Oral daily  dextrose 5%. 1000 milliLiter(s) (50 mL/Hr) IV Continuous <Continuous>  dextrose 50% Injectable 12.5 Gram(s) IV Push once  dextrose 50% Injectable 25 Gram(s) IV Push once  dextrose 50% Injectable 25 Gram(s) IV Push once  docusate sodium 100 milliGRAM(s) Oral two times a day  DULoxetine 60 milliGRAM(s) Oral daily  enoxaparin Injectable 40 milliGRAM(s) SubCutaneous daily  furosemide   Injectable 40 milliGRAM(s) IV Push every 12 hours  gabapentin 100 milliGRAM(s) Oral three times a day  insulin glargine Injectable (LANTUS) 7 Unit(s) SubCutaneous at bedtime  insulin lispro (HumaLOG) corrective regimen sliding scale   SubCutaneous three times a day before meals  isosorbide   mononitrate ER Tablet (IMDUR) 60 milliGRAM(s) Oral daily  losartan 50 milliGRAM(s) Oral daily  nitroglycerin    Patch 0.4 mG/Hr(s) 1 patch Transdermal daily  pantoprazole    Tablet 40 milliGRAM(s) Oral before breakfast      MEDICATIONS  (PRN):  acetaminophen   Tablet .. 650 milliGRAM(s) Oral every 6 hours PRN Temp greater or equal to 38C (100.4F), Mild Pain (1 - 3)  dextrose 40% Gel 15 Gram(s) Oral once PRN Blood Glucose LESS THAN 70 milliGRAM(s)/deciliter  glucagon  Injectable 1 milliGRAM(s) IntraMuscular once PRN Glucose LESS THAN 70 milligrams/deciliter  oxyCODONE    IR 5 milliGRAM(s) Oral every 6 hours PRN Moderate Pain (4 - 6)      Allergies    Accupril (Other)    Intolerances        PAST MEDICAL & SURGICAL HISTORY:  Herniated disc, cervical  PAD (peripheral artery disease)  Legally blind  History of peripheral vascular disease  History of retinal detachment  History of CEA (carotid endarterectomy): Right  Hyperlipidemia  Glaucoma  Hypertension  Diabetes  S/P CABG x 1  After cataract  Uveitic glaucoma of both eyes  Detached retina  Atherosclerosis of right carotid artery   delivery delivered      FAMILY HISTORY:  Family history of diabetes mellitus (Father, Mother)  Family history of essential hypertension (Father, Mother)      SOCIAL HISTORY  Smoking History:     REVIEW OF SYSTEMS:    CONSTITUTIONAL:  No fevers, chills, sweats    HEENT:  Eyes:  No diplopia or blurred vision. ENT:  No earache, sore throat or runny nose.    CARDIOVASCULAR:  No pressure, squeezing, tightness, or heaviness about the chest; no palpitations.    RESPIRATORY:  No cough,  PND or orthopnea. Mild SOBOE    GASTROINTESTINAL:  No abdominal pain, nausea, vomiting or diarrhea.    GENITOURINARY:  No dysuria, frequency or urgency.    NEUROLOGIC:  No paresthesias, fasciculations, seizures or weakness.    PSYCHIATRIC:  No disorder of thought or mood.    Vital Signs Last 24 Hrs  T(C): 36.6 (03 Dec 2018 11:34), Max: 37 (02 Dec 2018 14:23)  T(F): 97.9 (03 Dec 2018 11:34), Max: 98.6 (02 Dec 2018 14:23)  HR: 62 (03 Dec 2018 11:34) (61 - 70)  BP: 132/66 (03 Dec 2018 11:34) (122/56 - 145/72)  BP(mean): --  RR: 16 (03 Dec 2018 11:34) (16 - 16)  SpO2: 100% (03 Dec 2018 11:47) (98% - 100%)    PHYSICAL EXAMINATION:    GENERAL: The patient is a well-developed, well-nourished _____in no apparent distress.     HEENT: Head is normocephalic and atraumatic. Extraocular muscles are intact. Mucous membranes are moist.     NECK: Supple.     LUNGS: Diminished to auscultation without wheezing, rales, or rhonchi. Respirations unlabored    HEART: Regular rate and rhythm without murmur.    ABDOMEN: Soft, nontender, and nondistended.  No hepatosplenomegaly is noted.    EXTREMITIES: Without any cyanosis, clubbing, rash, lesions or edema.    NEUROLOGIC: Grossly intact.      LABS:        139  |  97<L>  |  52.0<H>  ----------------------------<  120<H>  4.8   |  34.0<H>  |  1.06    Ca    8.6      03 Dec 2018 07:10  Phos  3.7     12-  Mg     2.0     12-          ABG - ( 03 Dec 2018 10:49 )  pH, Arterial: 7.37  pH, Blood: x     /  pCO2: 66    /  pO2: 76    / HCO3: 34    / Base Excess: 10.8  /  SaO2: 95            RADIOLOGY & ADDITIONAL STUDIES:    CTA on   IMPRESSION:   1. Moderate right pleural effusion/atelectasis.Very small left pleural   effusion/atelectasis.   2. Bilateral perihilar groundglass opacities may represent pulmonary   edema.      TATE PRESTON M.D., ATTENDING RADIOLOGIST  This document has been electronically signed. Dec  2 2018  7:59AM      Echo on 18   1. Left ventricular ejection fraction, by visual estimation, is 65 to   70%.   2. Normal global left ventricular systolic function.   3. There is mild concentric left ventricular hypertrophy.   4. Moderately enlarged right ventricle.   5. Moderately reduced RV systolic function.   6. There is no evidence of pericardial effusion.   7. Moderate mitral valve regurgitation.   8. Thickening of the anterior and posterior mitral valve leaflets.   9. Moderate tricuspid regurgitation.  10. Sclerotic aortic valve with normal opening.  11. Trace pulmonic valve regurgitation.  12. Estimated pulmonary artery systolic pressure is 45.7 mmHg assuming a   right atrial pressure of 8 mmHg, which is consistent with mild pulmonary   hypertension.    [id number] Phoenix Alvarado MD, Electronically signed on 2018 at 8:26:51   PM

## 2018-12-05 LAB
ANION GAP SERPL CALC-SCNC: 6 MMOL/L — SIGNIFICANT CHANGE UP (ref 5–17)
ANION GAP SERPL CALC-SCNC: 7 MMOL/L — SIGNIFICANT CHANGE UP (ref 5–17)
APTT BLD: 33.4 SEC — SIGNIFICANT CHANGE UP (ref 27.5–36.3)
BASOPHILS # BLD AUTO: 0 K/UL — SIGNIFICANT CHANGE UP (ref 0–0.2)
BASOPHILS NFR BLD AUTO: 0.2 % — SIGNIFICANT CHANGE UP (ref 0–2)
BUN SERPL-MCNC: 68 MG/DL — HIGH (ref 8–20)
BUN SERPL-MCNC: 73 MG/DL — HIGH (ref 8–20)
CALCIUM SERPL-MCNC: 8.5 MG/DL — LOW (ref 8.6–10.2)
CALCIUM SERPL-MCNC: 8.5 MG/DL — LOW (ref 8.6–10.2)
CHLORIDE SERPL-SCNC: 95 MMOL/L — LOW (ref 98–107)
CHLORIDE SERPL-SCNC: 95 MMOL/L — LOW (ref 98–107)
CO2 SERPL-SCNC: 31 MMOL/L — HIGH (ref 22–29)
CO2 SERPL-SCNC: 32 MMOL/L — HIGH (ref 22–29)
CREAT SERPL-MCNC: 2.1 MG/DL — HIGH (ref 0.5–1.3)
CREAT SERPL-MCNC: 2.59 MG/DL — HIGH (ref 0.5–1.3)
EOSINOPHIL # BLD AUTO: 0.1 K/UL — SIGNIFICANT CHANGE UP (ref 0–0.5)
EOSINOPHIL NFR BLD AUTO: 2.1 % — SIGNIFICANT CHANGE UP (ref 0–6)
GLUCOSE BLDC GLUCOMTR-MCNC: 138 MG/DL — HIGH (ref 70–99)
GLUCOSE BLDC GLUCOMTR-MCNC: 154 MG/DL — HIGH (ref 70–99)
GLUCOSE BLDC GLUCOMTR-MCNC: 178 MG/DL — HIGH (ref 70–99)
GLUCOSE BLDC GLUCOMTR-MCNC: 206 MG/DL — HIGH (ref 70–99)
GLUCOSE BLDC GLUCOMTR-MCNC: 208 MG/DL — HIGH (ref 70–99)
GLUCOSE SERPL-MCNC: 144 MG/DL — HIGH (ref 70–115)
GLUCOSE SERPL-MCNC: 202 MG/DL — HIGH (ref 70–115)
HCT VFR BLD CALC: 25.3 % — LOW (ref 37–47)
HCT VFR BLD CALC: 26.1 % — LOW (ref 37–47)
HGB BLD-MCNC: 8.7 G/DL — LOW (ref 12–16)
HGB BLD-MCNC: 8.9 G/DL — LOW (ref 12–16)
INR BLD: 1.26 RATIO — HIGH (ref 0.88–1.16)
LYMPHOCYTES # BLD AUTO: 1.4 K/UL — SIGNIFICANT CHANGE UP (ref 1–4.8)
LYMPHOCYTES # BLD AUTO: 30.4 % — SIGNIFICANT CHANGE UP (ref 20–55)
MCHC RBC-ENTMCNC: 27.2 PG — SIGNIFICANT CHANGE UP (ref 27–31)
MCHC RBC-ENTMCNC: 27.3 PG — SIGNIFICANT CHANGE UP (ref 27–31)
MCHC RBC-ENTMCNC: 34.1 G/DL — SIGNIFICANT CHANGE UP (ref 32–36)
MCHC RBC-ENTMCNC: 34.4 G/DL — SIGNIFICANT CHANGE UP (ref 32–36)
MCV RBC AUTO: 79.3 FL — LOW (ref 81–99)
MCV RBC AUTO: 79.8 FL — LOW (ref 81–99)
MONOCYTES # BLD AUTO: 0.4 K/UL — SIGNIFICANT CHANGE UP (ref 0–0.8)
MONOCYTES NFR BLD AUTO: 9.4 % — SIGNIFICANT CHANGE UP (ref 3–10)
NEUTROPHILS # BLD AUTO: 2.8 K/UL — SIGNIFICANT CHANGE UP (ref 1.8–8)
NEUTROPHILS NFR BLD AUTO: 57.9 % — SIGNIFICANT CHANGE UP (ref 37–73)
PLATELET # BLD AUTO: 138 K/UL — LOW (ref 150–400)
PLATELET # BLD AUTO: 142 K/UL — LOW (ref 150–400)
POTASSIUM SERPL-MCNC: 5.2 MMOL/L — SIGNIFICANT CHANGE UP (ref 3.5–5.3)
POTASSIUM SERPL-MCNC: 5.8 MMOL/L — HIGH (ref 3.5–5.3)
POTASSIUM SERPL-SCNC: 5.2 MMOL/L — SIGNIFICANT CHANGE UP (ref 3.5–5.3)
POTASSIUM SERPL-SCNC: 5.8 MMOL/L — HIGH (ref 3.5–5.3)
PROTHROM AB SERPL-ACNC: 14.6 SEC — HIGH (ref 10–12.9)
RBC # BLD: 3.19 M/UL — LOW (ref 4.4–5.2)
RBC # BLD: 3.27 M/UL — LOW (ref 4.4–5.2)
RBC # FLD: 18.4 % — HIGH (ref 11–15.6)
RBC # FLD: 18.5 % — HIGH (ref 11–15.6)
SODIUM SERPL-SCNC: 132 MMOL/L — LOW (ref 135–145)
SODIUM SERPL-SCNC: 134 MMOL/L — LOW (ref 135–145)
WBC # BLD: 4.8 K/UL — SIGNIFICANT CHANGE UP (ref 4.8–10.8)
WBC # BLD: 5.6 K/UL — SIGNIFICANT CHANGE UP (ref 4.8–10.8)
WBC # FLD AUTO: 4.8 K/UL — SIGNIFICANT CHANGE UP (ref 4.8–10.8)
WBC # FLD AUTO: 5.6 K/UL — SIGNIFICANT CHANGE UP (ref 4.8–10.8)

## 2018-12-05 PROCEDURE — 99233 SBSQ HOSP IP/OBS HIGH 50: CPT

## 2018-12-05 PROCEDURE — 99232 SBSQ HOSP IP/OBS MODERATE 35: CPT

## 2018-12-05 PROCEDURE — 93010 ELECTROCARDIOGRAM REPORT: CPT

## 2018-12-05 PROCEDURE — 76775 US EXAM ABDO BACK WALL LIM: CPT | Mod: 26

## 2018-12-05 RX ORDER — FUROSEMIDE 40 MG
40 TABLET ORAL DAILY
Qty: 0 | Refills: 0 | Status: DISCONTINUED | OUTPATIENT
Start: 2018-12-05 | End: 2018-12-05

## 2018-12-05 RX ORDER — INSULIN HUMAN 100 [IU]/ML
10 INJECTION, SOLUTION SUBCUTANEOUS ONCE
Qty: 0 | Refills: 0 | Status: COMPLETED | OUTPATIENT
Start: 2018-12-05 | End: 2018-12-05

## 2018-12-05 RX ORDER — DEXTROSE 50 % IN WATER 50 %
50 SYRINGE (ML) INTRAVENOUS ONCE
Qty: 0 | Refills: 0 | Status: COMPLETED | OUTPATIENT
Start: 2018-12-05 | End: 2018-12-05

## 2018-12-05 RX ORDER — SODIUM POLYSTYRENE SULFONATE 4.1 MEQ/G
30 POWDER, FOR SUSPENSION ORAL ONCE
Qty: 0 | Refills: 0 | Status: COMPLETED | OUTPATIENT
Start: 2018-12-05 | End: 2018-12-05

## 2018-12-05 RX ADMIN — ENOXAPARIN SODIUM 40 MILLIGRAM(S): 100 INJECTION SUBCUTANEOUS at 21:41

## 2018-12-05 RX ADMIN — ATORVASTATIN CALCIUM 80 MILLIGRAM(S): 80 TABLET, FILM COATED ORAL at 21:41

## 2018-12-05 RX ADMIN — CARVEDILOL PHOSPHATE 25 MILLIGRAM(S): 80 CAPSULE, EXTENDED RELEASE ORAL at 17:20

## 2018-12-05 RX ADMIN — INSULIN HUMAN 10 UNIT(S): 100 INJECTION, SOLUTION SUBCUTANEOUS at 17:39

## 2018-12-05 RX ADMIN — Medication 81 MILLIGRAM(S): at 12:23

## 2018-12-05 RX ADMIN — INSULIN GLARGINE 7 UNIT(S): 100 INJECTION, SOLUTION SUBCUTANEOUS at 21:41

## 2018-12-05 RX ADMIN — LOSARTAN POTASSIUM 50 MILLIGRAM(S): 100 TABLET, FILM COATED ORAL at 05:28

## 2018-12-05 RX ADMIN — PANTOPRAZOLE SODIUM 40 MILLIGRAM(S): 20 TABLET, DELAYED RELEASE ORAL at 05:28

## 2018-12-05 RX ADMIN — CLOPIDOGREL BISULFATE 75 MILLIGRAM(S): 75 TABLET, FILM COATED ORAL at 12:23

## 2018-12-05 RX ADMIN — Medication 100 MILLIGRAM(S): at 17:20

## 2018-12-05 RX ADMIN — Medication 1: at 12:22

## 2018-12-05 RX ADMIN — GABAPENTIN 100 MILLIGRAM(S): 400 CAPSULE ORAL at 14:32

## 2018-12-05 RX ADMIN — Medication 1 PATCH: at 12:22

## 2018-12-05 RX ADMIN — GABAPENTIN 100 MILLIGRAM(S): 400 CAPSULE ORAL at 05:28

## 2018-12-05 RX ADMIN — Medication 2: at 17:20

## 2018-12-05 RX ADMIN — Medication 40 MILLIGRAM(S): at 05:29

## 2018-12-05 RX ADMIN — Medication 50 MILLILITER(S): at 17:39

## 2018-12-05 RX ADMIN — SODIUM POLYSTYRENE SULFONATE 30 GRAM(S): 4.1 POWDER, FOR SUSPENSION ORAL at 21:42

## 2018-12-05 RX ADMIN — DULOXETINE HYDROCHLORIDE 60 MILLIGRAM(S): 30 CAPSULE, DELAYED RELEASE ORAL at 12:23

## 2018-12-05 RX ADMIN — CARVEDILOL PHOSPHATE 25 MILLIGRAM(S): 80 CAPSULE, EXTENDED RELEASE ORAL at 05:28

## 2018-12-05 RX ADMIN — GABAPENTIN 100 MILLIGRAM(S): 400 CAPSULE ORAL at 21:41

## 2018-12-05 RX ADMIN — Medication 1 PATCH: at 19:44

## 2018-12-05 RX ADMIN — ISOSORBIDE MONONITRATE 60 MILLIGRAM(S): 60 TABLET, EXTENDED RELEASE ORAL at 12:23

## 2018-12-05 RX ADMIN — Medication 100 MILLIGRAM(S): at 05:29

## 2018-12-05 NOTE — PROGRESS NOTE ADULT - SUBJECTIVE AND OBJECTIVE BOX
PULMONARY PROGRESS NOTE      RANJIT [id number]    Patient is a 63y old  Female who presents with a chief complaint of Shortness of breath (05 Dec 2018 11:03)      INTERVAL HPI/OVERNIGHT EVENTS:  No new complaints  On dobutamine>hasn't changed picture  Right heart cath noted>high PCW.  Not diuresing  Review of CT of chest >c/w CHF.  Large right effusion with significant RLL compressive atelectasis.  Small left effusion  Tolerating nocturnal BIPAP    MEDICATIONS  (STANDING):  aspirin  chewable 81 milliGRAM(s) Oral daily  atorvastatin 80 milliGRAM(s) Oral at bedtime  carvedilol 25 milliGRAM(s) Oral every 12 hours  clopidogrel Tablet 75 milliGRAM(s) Oral daily  dextrose 5%. 1000 milliLiter(s) (50 mL/Hr) IV Continuous <Continuous>  dextrose 50% Injectable 12.5 Gram(s) IV Push once  dextrose 50% Injectable 25 Gram(s) IV Push once  dextrose 50% Injectable 25 Gram(s) IV Push once  DOBUTamine Infusion 1 MICROgram(s)/kG/Min (2.925 mL/Hr) IV Continuous <Continuous>  docusate sodium 100 milliGRAM(s) Oral two times a day  DULoxetine 60 milliGRAM(s) Oral daily  enoxaparin Injectable 40 milliGRAM(s) SubCutaneous daily  gabapentin 100 milliGRAM(s) Oral three times a day  insulin glargine Injectable (LANTUS) 7 Unit(s) SubCutaneous at bedtime  insulin lispro (HumaLOG) corrective regimen sliding scale   SubCutaneous three times a day before meals  isosorbide   mononitrate ER Tablet (IMDUR) 60 milliGRAM(s) Oral daily  losartan 50 milliGRAM(s) Oral daily  nitroglycerin    Patch 0.4 mG/Hr(s) 1 patch Transdermal daily  pantoprazole    Tablet 40 milliGRAM(s) Oral before breakfast      MEDICATIONS  (PRN):  acetaminophen   Tablet .. 650 milliGRAM(s) Oral every 6 hours PRN Temp greater or equal to 38C (100.4F), Mild Pain (1 - 3)  bisacodyl Suppository 10 milliGRAM(s) Rectal daily PRN Constipation  dextrose 40% Gel 15 Gram(s) Oral once PRN Blood Glucose LESS THAN 70 milliGRAM(s)/deciliter  glucagon  Injectable 1 milliGRAM(s) IntraMuscular once PRN Glucose LESS THAN 70 milligrams/deciliter  lactulose Syrup 10 Gram(s) Oral three times a day PRN colnstipaiton      Allergies    Accupril (Other)    Intolerances        PAST MEDICAL & SURGICAL HISTORY:  Herniated disc, cervical  PAD (peripheral artery disease)  Legally blind  History of peripheral vascular disease  History of retinal detachment  History of CEA (carotid endarterectomy): Right  Hyperlipidemia  Glaucoma  Hypertension  Diabetes  S/P CABG x 1  After cataract  Uveitic glaucoma of both eyes  Detached retina  Atherosclerosis of right carotid artery   delivery delivered      SOCIAL HISTORY  Smoking History:       REVIEW OF SYSTEMS:    CONSTITUTIONAL:  No distress    HEENT:  Eyes:  No diplopia or blurred vision. ENT:  No earache, sore throat or runny nose.    CARDIOVASCULAR:  No pressure, squeezing, tightness, heaviness or aching about the chest; no palpitations.    RESPIRATORY: +BOONE, denies cough or chest pain    GASTROINTESTINAL:  No nausea, vomiting or diarrhea.    GENITOURINARY:  No dysuria, frequency or urgency.    MUSCULOSKELETAL:  No joint pain    SKIN:  No new lesions.    NEUROLOGIC:  No paresthesias, fasciculations, seizures or weakness.    PSYCHIATRIC:  No disorder of thought or mood.    ENDOCRINE:  No heat or cold intolerance, polyuria or polydipsia.    HEMATOLOGICAL:  No easy bruising or bleeding.     Vital Signs Last 24 Hrs  T(C): 37 (05 Dec 2018 10:34), Max: 37 (05 Dec 2018 10:34)  T(F): 98.6 (05 Dec 2018 10:34), Max: 98.6 (05 Dec 2018 10:34)  HR: 65 (05 Dec 2018 12:35) (63 - 716)  BP: 122/64 (05 Dec 2018 12:35) (110/46 - 202/96)  BP(mean): --  RR: 18 (05 Dec 2018 10:34) (17 - 21)  SpO2: 98% (05 Dec 2018 10:34) (96% - 100%)    PHYSICAL EXAMINATION:    GENERAL: The patient is awake and alert in no apparent distress.     HEENT: Head is normocephalic and atraumatic. Extraocular muscles are intact. Mucous membranes are moist.    NECK: Supple.    LUNGS: Clear to auscultation without wheezing, rales or rhonchi; respirations unlabored; decreased breath sounds RLL approximately 1/3 up    HEART: Regular rate and rhythm without murmur.    ABDOMEN: Soft, nontender, and nondistended.      EXTREMITIES: Without any cyanosis, clubbing, rash, lesions; + edema.    NEUROLOGIC: Grossly intact.    SKIN: No ulceration or induration present.      LABS:                        8.7    4.8   )-----------( 142      ( 05 Dec 2018 06:06 )             25.3     12-05    134<L>  |  95<L>  |  68.0<H>  ----------------------------<  144<H>  5.2   |  32.0<H>  |  2.10<H>    Ca    8.5<L>      05 Dec 2018 06:06      PT/INR - ( 05 Dec 2018 06:03 )   PT: 14.6 sec;   INR: 1.26 ratio         PTT - ( 05 Dec 2018 06:03 )  PTT:33.4 sec                    MICROBIOLOGY:    RADIOLOGY & ADDITIONAL STUDIES:< from: CT Chest w/ IV Cont (18 @ 18:25) >     EXAM:  CT CHEST IC                          PROCEDURE DATE:  2018          INTERPRETATION:  [facility] RADIOLOGIST PRELIMINARY REPORT  Reviewed by VASILIY Fonseca M.D.  EXAM:    CT Chest With Contrast     EXAM DATE/TIME:    2018 6:13 PM     CLINICAL HISTORY:    63 years old, female; Pain; Abdominal pain; Generalized; Chest pain     TECHNIQUE:    Axial computed tomography images of the chest with intravenous contrast.    Coronal reformatted images were created and reviewed.     COMPARISON:   CT Abdomen^ABDOMEN PELVIS NC (Adult) 2018 9:40 PM     FINDINGS:    Lungs:  Bilateral perihilar groundglass opacities may represent   pulmonary   edema.    Pleural space:  Moderate right pleural effusion/atelectasis.Very small   left   pleural effusion/atelectasis.    Heart:  Cardiomegaly.    Aorta: Normal. No aortic aneurysm.    Lymph nodes: Unremarkable. No enlarged lymph nodes.    Bones/joints: Unremarkable. No acute fracture.    Soft tissues: Body wall anasarca.   Liver:  Hepatic steatosis.    Kidneys and ureters:  Right nephrolithiasis.    Intraperitoneal space:  Perihepatic and perisplenic ascites.     IMPRESSION:   1. Moderate right pleural effusion/atelectasis.Very small left pleural   effusion/atelectasis.   2. Bilateral perihilar groundglass opacities may represent pulmonary   edema.                TATE FONSECA M.D., ATTENDING RADIOLOGIST  This document has been electronically signed. Dec  2 2018  7:59AM        < end of copied text >

## 2018-12-05 NOTE — PROGRESS NOTE ADULT - SUBJECTIVE AND OBJECTIVE BOX
SUBJECTIVE:  Cardiology NP F/U note:  SP: RHC revealing Wedge pressure 30 and PAP's 70's  denies complaints of chest pain/sob/dizziness/palps overnight   tolerating diet / OOB chair     	  MEDICATIONS:  carvedilol 25 milliGRAM(s) Oral every 12 hours  DOBUTamine Infusion 1 MICROgram(s)/kG/Min IV Continuous <Continuous>  furosemide   Injectable 40 milliGRAM(s) IV Push every 12 hours  isosorbide   mononitrate ER Tablet (IMDUR) 60 milliGRAM(s) Oral daily  losartan 50 milliGRAM(s) Oral daily  nitroglycerin    Patch 0.4 mG/Hr(s) 1 patch Transdermal daily  acetaminophen   Tablet .. 650 milliGRAM(s) Oral every 6 hours PRN  DULoxetine 60 milliGRAM(s) Oral daily  gabapentin 100 milliGRAM(s) Oral three times a day  bisacodyl Suppository 10 milliGRAM(s) Rectal daily PRN  docusate sodium 100 milliGRAM(s) Oral two times a day  lactulose Syrup 10 Gram(s) Oral three times a day PRN  pantoprazole    Tablet 40 milliGRAM(s) Oral before breakfast  atorvastatin 80 milliGRAM(s) Oral at bedtime  dextrose 40% Gel 15 Gram(s) Oral once PRN  dextrose 50% Injectable 12.5 Gram(s) IV Push once  dextrose 50% Injectable 25 Gram(s) IV Push once  dextrose 50% Injectable 25 Gram(s) IV Push once  glucagon  Injectable 1 milliGRAM(s) IntraMuscular once PRN  insulin glargine Injectable (LANTUS) 7 Unit(s) SubCutaneous at bedtime  insulin lispro (HumaLOG) corrective regimen sliding scale   SubCutaneous three times a day before meals  aspirin  chewable 81 milliGRAM(s) Oral daily  clopidogrel Tablet 75 milliGRAM(s) Oral daily  dextrose 5%. 1000 milliLiter(s) IV Continuous <Continuous>  enoxaparin Injectable 40 milliGRAM(s) SubCutaneous daily        PHYSICAL EXAM:    T(C): 36.3 (18 @ 05:22), Max: 36.9 (18 @ 11:13)  HR: 65 (18 @ 09:05) (63 - 716)  BP: 127/42 (18 @ 05:22) (110/46 - 202/96)  RR: 20 (18 @ 09:05) (17 - 21)  SpO2: 97% (18 @ 09:05) (96% - 100%)  Wt(kg): --    I&O's Summary    04 Dec 2018 07:01  -  05 Dec 2018 07:00  --------------------------------------------------------  IN: 154.8 mL / OUT: 0 mL / NET: 154.8 mL        Daily     Daily Weight in k (05 Dec 2018 04:41)    Appearance: Normal	  HEENT:   Normal oral mucosa, / right neck slight swelling noted no pain or drainage   Lymphatic: No lymphadenopathy  Cardiovascular: Normal S1 S2RRR 60s No JVD, No murmurs, No edema  Respiratory: Lungs clear to auscultation	  Psychiatry: A & O x 3, Mood & affect appropriate  Gastrointestinal:  firm  Non-tender, + BS	  Skin: warm and dry  Neurologic: Non-focal  Extremities: Normal range of motion,:  Right IJ site slight swelling noted no hematoma   Vascular: Peripheral pulses palpable 2+ bilaterally    TELEMETRY: 	RSR 60s no events overnight     ECG:  	  RADIOLOGY:   DIAGNOSTIC TESTING:  [ X] Echocardiogram:  < from: TTE Echo Complete w/Doppler (18 @ 08:52) >  Left ventricular ejection fraction, by visual estimation, is 65 to   70%.   2. Normal global left ventricular systolic function.   3. There is mild concentric left ventricular hypertrophy.   4. Moderately enlarged right ventricle.   5. Moderately reduced RV systolic function.   6. There is no evidence of pericardial effusion.   7. Moderate mitral valve regurgitation.   8. Thickening of the anterior and posterior mitral valve leaflets.   9. Moderate tricuspid regurgitation.  10. Sclerotic aortic valve with normal opening.  11. Trace pulmonic valve regurgitation.  12. Estimated pulmonary artery systolic pressure is 45.7 mmHg assuming a   right atrial pressure of 8 mmHg, which is consistent with mild pulmonary   hypertension.      [ X]  Catheterization:    s/p RHC revealing Wedge pressure 30 and PAP's 70's official pending   [ ] Stress Test:    OTHER: 	    LABS:	 	    CARDIAC MARKERS:                                  8.7    4.8   )-----------( 142      ( 05 Dec 2018 06:06 )             25.3     12-05    134<L>  |  95<L>  |  68.0<H>  ----------------------------<  144<H>  5.2   |  32.0<H>  |  2.10<H>    Ca    8.5<L>      05 Dec 2018 06:06       ASSESSMENT:  63 f admitted with increasing BOONE /abd swelling/   Acute on chronic diastolic CHF- improved with diuresis   HX:DM2/ HTN/Carotid disease/ HLD/ PAD/ CAD/ CABG/ glaucoma/ legally blind  SP RHC: Wedge pressure 30 and PAP's 70's / Pulm HTN/ hypercarbic resp fail requiring BIPAP   maintained on Dobutamine at 1Mcg   pulm F/u noted   creat rising  2.1  /     Plan:  Continue current meds/ ASA Plavix statin coreg/ ARB /Doubutamine  Continue monitoring   decrease lasix to daily   follow labs in am   PT/ ? SINGH SUBJECTIVE:  Cardiology NP F/U note:  SP: RHC revealing Wedge pressure 30 and PAP's 70's  denies complaints of chest pain/sob/dizziness/palps overnight   tolerating diet / OOB chair     	  MEDICATIONS:  carvedilol 25 milliGRAM(s) Oral every 12 hours  DOBUTamine Infusion 1 MICROgram(s)/kG/Min IV Continuous <Continuous>  furosemide   Injectable 40 milliGRAM(s) IV Push every 12 hours  isosorbide   mononitrate ER Tablet (IMDUR) 60 milliGRAM(s) Oral daily  losartan 50 milliGRAM(s) Oral daily  nitroglycerin    Patch 0.4 mG/Hr(s) 1 patch Transdermal daily  acetaminophen   Tablet .. 650 milliGRAM(s) Oral every 6 hours PRN  DULoxetine 60 milliGRAM(s) Oral daily  gabapentin 100 milliGRAM(s) Oral three times a day  bisacodyl Suppository 10 milliGRAM(s) Rectal daily PRN  docusate sodium 100 milliGRAM(s) Oral two times a day  lactulose Syrup 10 Gram(s) Oral three times a day PRN  pantoprazole    Tablet 40 milliGRAM(s) Oral before breakfast  atorvastatin 80 milliGRAM(s) Oral at bedtime  dextrose 40% Gel 15 Gram(s) Oral once PRN  dextrose 50% Injectable 12.5 Gram(s) IV Push once  dextrose 50% Injectable 25 Gram(s) IV Push once  dextrose 50% Injectable 25 Gram(s) IV Push once  glucagon  Injectable 1 milliGRAM(s) IntraMuscular once PRN  insulin glargine Injectable (LANTUS) 7 Unit(s) SubCutaneous at bedtime  insulin lispro (HumaLOG) corrective regimen sliding scale   SubCutaneous three times a day before meals  aspirin  chewable 81 milliGRAM(s) Oral daily  clopidogrel Tablet 75 milliGRAM(s) Oral daily  dextrose 5%. 1000 milliLiter(s) IV Continuous <Continuous>  enoxaparin Injectable 40 milliGRAM(s) SubCutaneous daily        PHYSICAL EXAM:    T(C): 36.3 (18 @ 05:22), Max: 36.9 (18 @ 11:13)  HR: 65 (18 @ 09:05) (63 - 716)  BP: 127/42 (18 @ 05:22) (110/46 - 202/96)  RR: 20 (18 @ 09:05) (17 - 21)  SpO2: 97% (18 @ 09:05) (96% - 100%)  Wt(kg): --    I&O's Summary    04 Dec 2018 07:01  -  05 Dec 2018 07:00  --------------------------------------------------------  IN: 154.8 mL / OUT: 0 mL / NET: 154.8 mL        Daily     Daily Weight in k (05 Dec 2018 04:41)    Appearance: Normal	  HEENT:   Normal oral mucosa, / right neck slight swelling noted no pain or drainage   Lymphatic: No lymphadenopathy  Cardiovascular: Normal S1 S2RRR 60s No JVD, No murmurs, No edema  Respiratory: Lungs clear to auscultation	  Psychiatry: A & O x 3, Mood & affect appropriate  Gastrointestinal:  firm  Non-tender, + BS	  Skin: warm and dry  Neurologic: Non-focal  Extremities: Normal range of motion,:  Right IJ site slight swelling noted no hematoma   Vascular: Peripheral pulses palpable 2+ bilaterally    TELEMETRY: 	RSR 60s no events overnight     ECG:  	  RADIOLOGY:   DIAGNOSTIC TESTING:  [ X] Echocardiogram:  < from: TTE Echo Complete w/Doppler (18 @ 08:52) >  Left ventricular ejection fraction, by visual estimation, is 65 to   70%.   2. Normal global left ventricular systolic function.   3. There is mild concentric left ventricular hypertrophy.   4. Moderately enlarged right ventricle.   5. Moderately reduced RV systolic function.   6. There is no evidence of pericardial effusion.   7. Moderate mitral valve regurgitation.   8. Thickening of the anterior and posterior mitral valve leaflets.   9. Moderate tricuspid regurgitation.  10. Sclerotic aortic valve with normal opening.  11. Trace pulmonic valve regurgitation.  12. Estimated pulmonary artery systolic pressure is 45.7 mmHg assuming a   right atrial pressure of 8 mmHg, which is consistent with mild pulmonary   hypertension.      [ X]  Catheterization:    s/p RHC revealing Wedge pressure 30 and PAP's 70's official pending   [ ] Stress Test:    OTHER: 	    LABS:	 	    CARDIAC MARKERS:                                  8.7    4.8   )-----------( 142      ( 05 Dec 2018 06:06 )             25.3     12-05    134<L>  |  95<L>  |  68.0<H>  ----------------------------<  144<H>  5.2   |  32.0<H>  |  2.10<H>    Ca    8.5<L>      05 Dec 2018 06:06       ASSESSMENT:  63 f admitted with increasing BOONE /abd swelling/   Acute on chronic diastolic CHF- improved with diuresis   HX:DM2/ HTN/Carotid disease/ HLD/ PAD/ CAD/ CABG/ glaucoma/ legally blind  SP RHC: Wedge pressure 30 and PAP's 70's / Pulm HTN/ hypercarbic resp fail requiring BIPAP   maintained on Dobutamine at 1Mcg   pulm F/u noted   creat rising  2.1  /     Plan:  Continue current meds/ ASA Plavix statin coreg/ ARB /Doubutamine  Continue monitoring   D/C Lasix/ ? ARF or MOLLY   follow labs in am   PT/ ? SINGH   Suggest renal eval   D/W Dr. Orozco and Dr. Recio

## 2018-12-05 NOTE — PROGRESS NOTE ADULT - ASSESSMENT
Pt is 64 y/o female with PMHx of CAD s/p CABG x1, Stent placement x2, PAD s/p balloon angioplasty, DM2, HTN, HLD came to the ED with c/o  worsening dyspnea with minimal exertion, increasing abdominal girth and limited ambulation due to SOB. Found to have elevated proBNP and congestion on CXR- seen by cardiology in ED and admitted to medicine for acute on chronic HFpEF in consultation with cardiology. Pt kept on IV diuresis, echo showed diastolic dysfunction.  Course complicated by metabolic encephalopathy, abg performed which showed elevated co2 and placed on bipap with improvement of mentation. Pulmonary on board and will need NIV on discharge.     1) Acute metabolic encephalopathy- Resolved.   --> secondary to CO2 narcosis.   --> pulm following  --> Previous hospitalist spoke to daughter and was diagnosed with KYREE in the past but never followed up for a sleep study  --> will need trilogy on dc     2) acute on chronic diastolic heart failure:  --> Reduced RV function on echo.  --. S/p RHC with elevated pressure  Wedge pressure 30 and PAP's 70's   --> cardio following  --> iv Lasix held by cardio due to jump in creatinine.    --> On dobutamine per cardio.     3) KEITH -->Creat up trend noted, ? lab error- will repeat stat BMP. Lasix held, Monitor BMP. Consider renal consult if not improving.     4) H/o CAD s/p CABG and stent- Stable,  C/w ASA/ Plavix/ Statins/ Imdur/ Coreg. Cardio on board. Had positive nuclear test on this admission       5) H/o PAD- s/p balloon angioplasty in the past  --> , C/w ASA/ Plavix/ Lipitor.     6) H/o HTN /HLD- Continue home medications     7) H/o DM-2 - C/w Lantus, LSS + FS monitoring. Hold home metformin.     8) Chronic lower backache- Pain meds prn.    9) dvt prop --> Lovenox sub q

## 2018-12-05 NOTE — PROGRESS NOTE ADULT - ATTENDING COMMENTS
Dobutamine for right heart dysfunction.   WEdge pressure elevated. Given diuresis but rise in creatinine. Most likely secondary to MOLLY form CT? Do not think this is secondary to poor forward flow.   Hold diuretics.   Consider thoracentesis.

## 2018-12-05 NOTE — PROGRESS NOTE ADULT - SUBJECTIVE AND OBJECTIVE BOX
JOSEFINA KUN Female 63y [id number]    Patient is a 63y old  Female who presents with a chief complaint of Shortness of breath (05 Dec 2018 10:25)      Subjective/objective:  Pt seen and examined at bedside, no over night event reported by night staff. Pt reports having dizziness and feeling weak, she has no SOB or chest pain. Per RN Pt very weak and could not even stand on her own.     Review of system:  No fever, chills, nausea, vomiting, headache, chest pain, SOB or palpitation.      PHYSICAL EXAM:    Vital Signs Last 24 Hrs  T(C): 37 (05 Dec 2018 10:34), Max: 37 (05 Dec 2018 10:34)  T(F): 98.6 (05 Dec 2018 10:34), Max: 98.6 (05 Dec 2018 10:34)  HR: 64 (05 Dec 2018 10:34) (63 - 716)  BP: 123/75 (05 Dec 2018 10:34) (110/46 - 202/96)  BP(mean): --  RR: 18 (05 Dec 2018 10:34) (17 - 21)  SpO2: 98% (05 Dec 2018 10:34) (96% - 100%)    GENERAL: Pt lying comfortably, NAD.  CHEST/LUNG: Clear to auscultation bilaterally; No wheezing.  HEART: S1S2+, Regular rate and rhythm; No murmurs.  ABDOMEN: Soft, Nontender, Nondistended; Bowel sounds present.  Extremities:  Trace pedal edema, right toe amputation. old foot wound.  NEURO: AAOX3, no focal deficits, no motor r sensory loss.  PSYCH: normal mood.          MEDICATIONS  (STANDING):  aspirin  chewable 81 milliGRAM(s) Oral daily  atorvastatin 80 milliGRAM(s) Oral at bedtime  carvedilol 25 milliGRAM(s) Oral every 12 hours  clopidogrel Tablet 75 milliGRAM(s) Oral daily  dextrose 5%. 1000 milliLiter(s) (50 mL/Hr) IV Continuous <Continuous>  dextrose 50% Injectable 12.5 Gram(s) IV Push once  dextrose 50% Injectable 25 Gram(s) IV Push once  dextrose 50% Injectable 25 Gram(s) IV Push once  DOBUTamine Infusion 1 MICROgram(s)/kG/Min (2.925 mL/Hr) IV Continuous <Continuous>  docusate sodium 100 milliGRAM(s) Oral two times a day  DULoxetine 60 milliGRAM(s) Oral daily  enoxaparin Injectable 40 milliGRAM(s) SubCutaneous daily  furosemide   Injectable 40 milliGRAM(s) IV Push daily  gabapentin 100 milliGRAM(s) Oral three times a day  insulin glargine Injectable (LANTUS) 7 Unit(s) SubCutaneous at bedtime  insulin lispro (HumaLOG) corrective regimen sliding scale   SubCutaneous three times a day before meals  isosorbide   mononitrate ER Tablet (IMDUR) 60 milliGRAM(s) Oral daily  losartan 50 milliGRAM(s) Oral daily  nitroglycerin    Patch 0.4 mG/Hr(s) 1 patch Transdermal daily  pantoprazole    Tablet 40 milliGRAM(s) Oral before breakfast    MEDICATIONS  (PRN):  acetaminophen   Tablet .. 650 milliGRAM(s) Oral every 6 hours PRN Temp greater or equal to 38C (100.4F), Mild Pain (1 - 3)  bisacodyl Suppository 10 milliGRAM(s) Rectal daily PRN Constipation  dextrose 40% Gel 15 Gram(s) Oral once PRN Blood Glucose LESS THAN 70 milliGRAM(s)/deciliter  glucagon  Injectable 1 milliGRAM(s) IntraMuscular once PRN Glucose LESS THAN 70 milligrams/deciliter  lactulose Syrup 10 Gram(s) Oral three times a day PRN colnstipaiton        Labs:  LABS:                        8.7    4.8   )-----------( 142      ( 05 Dec 2018 06:06 )             25.3     12-05    134<L>  |  95<L>  |  68.0<H>  ----------------------------<  144<H>  5.2   |  32.0<H>  |  2.10<H>    Ca    8.5<L>      05 Dec 2018 06:06      PT/INR - ( 05 Dec 2018 06:03 )   PT: 14.6 sec;   INR: 1.26 ratio         PTT - ( 05 Dec 2018 06:03 )  PTT:33.4 sec

## 2018-12-05 NOTE — PROGRESS NOTE ADULT - ASSESSMENT
Patient with hypercarbic compensated respiratory failure  Diastolic dysfunction and secondary pulmonary hypertension>additional potential issues include untreated KYREE, renal dysfunction.  RV dysfunction may have a primary component to it but unlikely.      Plan:  Patient may benefit from thoracentesis and recruitment of lung to improve ventilation and thus reduce hypercapnea  Continue nocturnal BIPAP  Discussed with cardiology  Will need outpatient sleep study  F/u ABG prior to d/c.  Have contacted IR re:thoracentesis.

## 2018-12-06 LAB
ANION GAP SERPL CALC-SCNC: 10 MMOL/L — SIGNIFICANT CHANGE UP (ref 5–17)
ANION GAP SERPL CALC-SCNC: 9 MMOL/L — SIGNIFICANT CHANGE UP (ref 5–17)
BUN SERPL-MCNC: 74 MG/DL — HIGH (ref 8–20)
BUN SERPL-MCNC: 77 MG/DL — HIGH (ref 8–20)
CALCIUM SERPL-MCNC: 8 MG/DL — LOW (ref 8.6–10.2)
CALCIUM SERPL-MCNC: 8.2 MG/DL — LOW (ref 8.6–10.2)
CHLORIDE SERPL-SCNC: 94 MMOL/L — LOW (ref 98–107)
CHLORIDE SERPL-SCNC: 95 MMOL/L — LOW (ref 98–107)
CO2 SERPL-SCNC: 27 MMOL/L — SIGNIFICANT CHANGE UP (ref 22–29)
CO2 SERPL-SCNC: 30 MMOL/L — HIGH (ref 22–29)
CREAT SERPL-MCNC: 2.77 MG/DL — HIGH (ref 0.5–1.3)
CREAT SERPL-MCNC: 2.83 MG/DL — HIGH (ref 0.5–1.3)
GLUCOSE BLDC GLUCOMTR-MCNC: 109 MG/DL — HIGH (ref 70–99)
GLUCOSE BLDC GLUCOMTR-MCNC: 110 MG/DL — HIGH (ref 70–99)
GLUCOSE BLDC GLUCOMTR-MCNC: 203 MG/DL — HIGH (ref 70–99)
GLUCOSE BLDC GLUCOMTR-MCNC: 97 MG/DL — SIGNIFICANT CHANGE UP (ref 70–99)
GLUCOSE SERPL-MCNC: 89 MG/DL — SIGNIFICANT CHANGE UP (ref 70–115)
GLUCOSE SERPL-MCNC: 89 MG/DL — SIGNIFICANT CHANGE UP (ref 70–115)
HCT VFR BLD CALC: 24 % — LOW (ref 37–47)
HGB BLD-MCNC: 8.1 G/DL — LOW (ref 12–16)
MAGNESIUM SERPL-MCNC: 2.1 MG/DL — SIGNIFICANT CHANGE UP (ref 1.6–2.6)
MCHC RBC-ENTMCNC: 26.9 PG — LOW (ref 27–31)
MCHC RBC-ENTMCNC: 33.8 G/DL — SIGNIFICANT CHANGE UP (ref 32–36)
MCV RBC AUTO: 79.7 FL — LOW (ref 81–99)
PLATELET # BLD AUTO: 112 K/UL — LOW (ref 150–400)
POTASSIUM SERPL-MCNC: 5.4 MMOL/L — HIGH (ref 3.5–5.3)
POTASSIUM SERPL-MCNC: 6.6 MMOL/L — CRITICAL HIGH (ref 3.5–5.3)
POTASSIUM SERPL-SCNC: 5.4 MMOL/L — HIGH (ref 3.5–5.3)
POTASSIUM SERPL-SCNC: 6.6 MMOL/L — CRITICAL HIGH (ref 3.5–5.3)
RBC # BLD: 3.01 M/UL — LOW (ref 4.4–5.2)
RBC # FLD: 18.6 % — HIGH (ref 11–15.6)
SODIUM SERPL-SCNC: 132 MMOL/L — LOW (ref 135–145)
SODIUM SERPL-SCNC: 133 MMOL/L — LOW (ref 135–145)
WBC # BLD: 5.2 K/UL — SIGNIFICANT CHANGE UP (ref 4.8–10.8)
WBC # FLD AUTO: 5.2 K/UL — SIGNIFICANT CHANGE UP (ref 4.8–10.8)

## 2018-12-06 PROCEDURE — 99232 SBSQ HOSP IP/OBS MODERATE 35: CPT

## 2018-12-06 PROCEDURE — 71045 X-RAY EXAM CHEST 1 VIEW: CPT | Mod: 26

## 2018-12-06 PROCEDURE — 93010 ELECTROCARDIOGRAM REPORT: CPT

## 2018-12-06 RX ORDER — CALCIUM GLUCONATE 100 MG/ML
2 VIAL (ML) INTRAVENOUS ONCE
Qty: 0 | Refills: 0 | Status: COMPLETED | OUTPATIENT
Start: 2018-12-06 | End: 2018-12-06

## 2018-12-06 RX ORDER — SODIUM CHLORIDE 9 MG/ML
1000 INJECTION INTRAMUSCULAR; INTRAVENOUS; SUBCUTANEOUS
Qty: 0 | Refills: 0 | Status: DISCONTINUED | OUTPATIENT
Start: 2018-12-06 | End: 2018-12-09

## 2018-12-06 RX ORDER — SODIUM POLYSTYRENE SULFONATE 4.1 MEQ/G
30 POWDER, FOR SUSPENSION ORAL ONCE
Qty: 0 | Refills: 0 | Status: COMPLETED | OUTPATIENT
Start: 2018-12-06 | End: 2018-12-06

## 2018-12-06 RX ADMIN — Medication 100 MILLIGRAM(S): at 05:31

## 2018-12-06 RX ADMIN — Medication 1 PATCH: at 13:35

## 2018-12-06 RX ADMIN — GABAPENTIN 100 MILLIGRAM(S): 400 CAPSULE ORAL at 05:31

## 2018-12-06 RX ADMIN — GABAPENTIN 100 MILLIGRAM(S): 400 CAPSULE ORAL at 13:41

## 2018-12-06 RX ADMIN — CARVEDILOL PHOSPHATE 25 MILLIGRAM(S): 80 CAPSULE, EXTENDED RELEASE ORAL at 17:30

## 2018-12-06 RX ADMIN — Medication 100 MILLIGRAM(S): at 17:30

## 2018-12-06 RX ADMIN — ISOSORBIDE MONONITRATE 60 MILLIGRAM(S): 60 TABLET, EXTENDED RELEASE ORAL at 11:55

## 2018-12-06 RX ADMIN — SODIUM POLYSTYRENE SULFONATE 30 GRAM(S): 4.1 POWDER, FOR SUSPENSION ORAL at 11:55

## 2018-12-06 RX ADMIN — PANTOPRAZOLE SODIUM 40 MILLIGRAM(S): 20 TABLET, DELAYED RELEASE ORAL at 05:31

## 2018-12-06 RX ADMIN — CARVEDILOL PHOSPHATE 25 MILLIGRAM(S): 80 CAPSULE, EXTENDED RELEASE ORAL at 05:31

## 2018-12-06 RX ADMIN — SODIUM CHLORIDE 100 MILLILITER(S): 9 INJECTION INTRAMUSCULAR; INTRAVENOUS; SUBCUTANEOUS at 12:23

## 2018-12-06 RX ADMIN — DULOXETINE HYDROCHLORIDE 60 MILLIGRAM(S): 30 CAPSULE, DELAYED RELEASE ORAL at 11:55

## 2018-12-06 RX ADMIN — GABAPENTIN 100 MILLIGRAM(S): 400 CAPSULE ORAL at 23:13

## 2018-12-06 RX ADMIN — ATORVASTATIN CALCIUM 80 MILLIGRAM(S): 80 TABLET, FILM COATED ORAL at 23:13

## 2018-12-06 RX ADMIN — INSULIN GLARGINE 7 UNIT(S): 100 INJECTION, SOLUTION SUBCUTANEOUS at 23:13

## 2018-12-06 RX ADMIN — LOSARTAN POTASSIUM 50 MILLIGRAM(S): 100 TABLET, FILM COATED ORAL at 05:31

## 2018-12-06 RX ADMIN — Medication 1 PATCH: at 00:05

## 2018-12-06 RX ADMIN — Medication 200 GRAM(S): at 11:55

## 2018-12-06 NOTE — PROGRESS NOTE ADULT - SUBJECTIVE AND OBJECTIVE BOX
SUBJECTIVE:  Cardiology NP F/U note:  Dr. Recio/  awake alert OOB chair/ zoe meals   denies complaints of chest pain/sob/dizziness/palps comfortable   awaiting thoracentesis today.      	  MEDICATIONS:  carvedilol 25 milliGRAM(s) Oral every 12 hours  DOBUTamine Infusion 1 MICROgram(s)/kG/Min IV Continuous <Continuous>  isosorbide   mononitrate ER Tablet (IMDUR) 60 milliGRAM(s) Oral daily  nitroglycerin    Patch 0.4 mG/Hr(s) 1 patch Transdermal daily  acetaminophen   Tablet .. 650 milliGRAM(s) Oral every 6 hours PRN  DULoxetine 60 milliGRAM(s) Oral daily  gabapentin 100 milliGRAM(s) Oral three times a day  bisacodyl Suppository 10 milliGRAM(s) Rectal daily PRN  docusate sodium 100 milliGRAM(s) Oral two times a day  lactulose Syrup 10 Gram(s) Oral three times a day PRN  pantoprazole    Tablet 40 milliGRAM(s) Oral before breakfast  atorvastatin 80 milliGRAM(s) Oral at bedtime  dextrose 40% Gel 15 Gram(s) Oral once PRN  dextrose 50% Injectable 12.5 Gram(s) IV Push once  dextrose 50% Injectable 25 Gram(s) IV Push once  dextrose 50% Injectable 25 Gram(s) IV Push once  glucagon  Injectable 1 milliGRAM(s) IntraMuscular once PRN  insulin glargine Injectable (LANTUS) 7 Unit(s) SubCutaneous at bedtime  insulin lispro (HumaLOG) corrective regimen sliding scale   SubCutaneous three times a day before meals  aspirin  chewable 81 milliGRAM(s) Oral daily  clopidogrel Tablet 75 milliGRAM(s) Oral daily  dextrose 5%. 1000 milliLiter(s) IV Continuous <Continuous>  enoxaparin Injectable 40 milliGRAM(s) SubCutaneous daily  sodium chloride 0.9%. 1000 milliLiter(s) IV Continuous <Continuous>        PHYSICAL EXAM:    T(C): 36.8 (18 @ 10:18), Max: 37 (18 @ 21:37)  HR: 65 (18 @ 10:18) (65 - 70)  BP: 134/76 (18 @ 10:18) (110/65 - 136/63)  RR: 18 (18 @ 10:18) (18 - 24)  SpO2: 100% (18 @ 10:18) (95% - 100%)  Wt(kg): --    I&O's Summary    05 Dec 2018 07:01  -  06 Dec 2018 07:00  --------------------------------------------------------  IN: 514.6 mL / OUT: 0 mL / NET: 514.6 mL        Daily     Daily Weight in k.5 (06 Dec 2018 05:05)    Appearance: Normal	  HEENT:   Normal oral mucosa,   Lymphatic: No lymphadenopathy  Cardiovascular: Normal S1 S2,RRR 64 No JVD, 3/6 BENTON LSB to neck, No edema  Respiratory: diminished Right base   Psychiatry: A & O x 3, Mood & affect appropriate  Gastrointestinal:  Soft, Non-tender, + BS	  Skin: warm and dry  Neurologic: Non-focal  Extremities: Normal range of motion,:  Vascular: Peripheral pulses palpable 2+ bilaterally    TELEMETRY: 	RSR 60's no events on tele    ECG:  	  RADIOLOGY:   DIAGNOSTIC TESTING:  [X] Echocardiogram:    < from: TTE Echo Complete w/Doppler (18 @ 08:52) >  Left ventricular ejection fraction, by visual estimation, is 65 to   70%.   2. Normal global left ventricular systolic function.   3. There is mild concentric left ventricular hypertrophy.   4. Moderately enlarged right ventricle.   5. Moderately reduced RV systolic function.   6. There is no evidence of pericardial effusion.   7. Moderate mitral valve regurgitation.   8. Thickening of the anterior and posterior mitral valve leaflets.   9. Moderate tricuspid regurgitation.  10. Sclerotic aortic valve with normal opening.  11. Trace pulmonic valve regurgitation.  12. Estimated pulmonary artery systolic pressure is 45.7 mmHg assuming a   right atrial pressure of 8 mmHg, which is consistent with mild pulmonary   hypertension.    < end of copied text >  [ X]  Catheterization:    < from: Cardiac Cath Lab - Adult (18 @ 15:00) >  DIAGNOSTIC IMPRESSIONS: Severe pulmonary hypertension.  Severely elevated wedge pressure.  Poor cardiac output with low mixed venous saturation.    < end of copied text >  [ ] Stress Test:    OTHER: 	    LABS:	 	    CARDIAC MARKERS:                                  8.1    5.2   )-----------( 112      ( 06 Dec 2018 06:41 )             24.0     12-    133<L>  |  94<L>  |  77.0<H>  ----------------------------<  89  5.4<H>   |  30.0<H>  |  2.77<H>    Ca    8.2<L>      06 Dec 2018 09:24  Mg     2.1     12-      ASSESSMENT:  63 f admitted with increasing BOONE /abd swelling/   Acute on chronic diastolic CHF- improved with diuresis   HX:DM2/ HTN/Carotid disease/ HLD/ PAD/ CAD/ CABG/ glaucoma/ legally blind  SP RHC: Wedge pressure 30 and PAP's 70's / Pulm HTN/ hypercarbic resp fail requiring BIPAP   maintained on Dobutamine at 1Mcg   pulm F/u noted   pleural effusions R> L    KEITH/ creat rising despite d/c lasix and ARB  Hyperkalemia 5.4/  renal consult noted       Plan:  Continue current meds/ ASA Plavix statin coreg/ ARB /Doubutamine  Continue monitoring  for Thoracentesis today  IVF / hydration per renal recommendation / Kayexelate / Ca gluc SUBJECTIVE:  Cardiology NP F/U note:  Dr. Recio/  awake alert OOB chair/ zoe meals   denies complaints of chest pain/sob/dizziness/palps comfortable   awaiting thoracentesis today.      	  MEDICATIONS:  carvedilol 25 milliGRAM(s) Oral every 12 hours  DOBUTamine Infusion 1 MICROgram(s)/kG/Min IV Continuous <Continuous>  isosorbide   mononitrate ER Tablet (IMDUR) 60 milliGRAM(s) Oral daily  nitroglycerin    Patch 0.4 mG/Hr(s) 1 patch Transdermal daily  acetaminophen   Tablet .. 650 milliGRAM(s) Oral every 6 hours PRN  DULoxetine 60 milliGRAM(s) Oral daily  gabapentin 100 milliGRAM(s) Oral three times a day  bisacodyl Suppository 10 milliGRAM(s) Rectal daily PRN  docusate sodium 100 milliGRAM(s) Oral two times a day  lactulose Syrup 10 Gram(s) Oral three times a day PRN  pantoprazole    Tablet 40 milliGRAM(s) Oral before breakfast  atorvastatin 80 milliGRAM(s) Oral at bedtime  dextrose 40% Gel 15 Gram(s) Oral once PRN  dextrose 50% Injectable 12.5 Gram(s) IV Push once  dextrose 50% Injectable 25 Gram(s) IV Push once  dextrose 50% Injectable 25 Gram(s) IV Push once  glucagon  Injectable 1 milliGRAM(s) IntraMuscular once PRN  insulin glargine Injectable (LANTUS) 7 Unit(s) SubCutaneous at bedtime  insulin lispro (HumaLOG) corrective regimen sliding scale   SubCutaneous three times a day before meals  aspirin  chewable 81 milliGRAM(s) Oral daily  clopidogrel Tablet 75 milliGRAM(s) Oral daily  dextrose 5%. 1000 milliLiter(s) IV Continuous <Continuous>  enoxaparin Injectable 40 milliGRAM(s) SubCutaneous daily  sodium chloride 0.9%. 1000 milliLiter(s) IV Continuous <Continuous>        PHYSICAL EXAM:    T(C): 36.8 (18 @ 10:18), Max: 37 (18 @ 21:37)  HR: 65 (18 @ 10:18) (65 - 70)  BP: 134/76 (18 @ 10:18) (110/65 - 136/63)  RR: 18 (18 @ 10:18) (18 - 24)  SpO2: 100% (18 @ 10:18) (95% - 100%)  Wt(kg): --    I&O's Summary    05 Dec 2018 07:01  -  06 Dec 2018 07:00  --------------------------------------------------------  IN: 514.6 mL / OUT: 0 mL / NET: 514.6 mL        Daily     Daily Weight in k.5 (06 Dec 2018 05:05)    Appearance: Normal	  HEENT:   Normal oral mucosa,   Lymphatic: No lymphadenopathy  Cardiovascular: Normal S1 S2,RRR 64 No JVD, 3/6 BENT[place] LSB to neck, No edema  Respiratory: diminished Right base   Psychiatry: A & O x 3, Mood & affect appropriate  Gastrointestinal:  Soft, Non-tender, + BS	  Skin: warm and dry  Neurologic: Non-focal  Extremities: Normal range of motion,:  Vascular: Peripheral pulses palpable 2+ bilaterally    TELEMETRY: 	RSR 60's no events on tele    ECG:  	RSR 64/ flipped T's I AVL V4-V6  II II AVF unchanged from previous.   RADIOLOGY:   DIAGNOSTIC TESTING:  [X] Echocardiogram:    < from: TTE Echo Complete w/Doppler (18 @ 08:52) >  Left ventricular ejection fraction, by visual estimation, is 65 to   70%.   2. Normal global left ventricular systolic function.   3. There is mild concentric left ventricular hypertrophy.   4. Moderately enlarged right ventricle.   5. Moderately reduced RV systolic function.   6. There is no evidence of pericardial effusion.   7. Moderate mitral valve regurgitation.   8. Thickening of the anterior and posterior mitral valve leaflets.   9. Moderate tricuspid regurgitation.  10. Sclerotic aortic valve with normal opening.  11. Trace pulmonic valve regurgitation.  12. Estimated pulmonary artery systolic pressure is 45.7 mmHg assuming a   right atrial pressure of 8 mmHg, which is consistent with mild pulmonary   hypertension.    < end of copied text >  [ X]  Catheterization:    < from: Cardiac Cath Lab - Adult (18 @ 15:00) >  DIAGNOSTIC IMPRESSIONS: Severe pulmonary hypertension.  Severely elevated wedge pressure.  Poor cardiac output with low mixed venous saturation.    < end of copied text >  [ ] Stress Test:    OTHER: 	    LABS:	 	    CARDIAC MARKERS:                                  8.1    5.2   )-----------( 112      ( 06 Dec 2018 06:41 )             24.0     12-    133<L>  |  94<L>  |  77.0<H>  ----------------------------<  89  5.4<H>   |  30.0<H>  |  2.77<H>    Ca    8.2<L>      06 Dec 2018 09:24  Mg     2.1     12-      ASSESSMENT:  63 f admitted with increasing BOONE /abd swelling/   Acute on chronic diastolic CHF- improved with diuresis   HX:DM2/ HTN/Carotid disease/ HLD/ PAD/ CAD/ CABG/ glaucoma/ legally blind  SP RHC: Wedge pressure 30 and PAP's 70's / Pulm HTN/ hypercarbic resp fail requiring BIPAP   maintained on Dobutamine at 1Mcg   pulm F/u noted   pleural effusions R> L    KEITH/ creat rising despite d/c lasix and ARB  Hyperkalemia 5.4/ EKG noted  renal consult noted       Plan:  Continue current meds/ ASA Plavix statin coreg/ ARB /Doubutamine  Continue monitoring  for Thoracentesis today  IVF / hydration per renal recommendation / Kayexelate / Ca gluc   continue to monitor labs.

## 2018-12-06 NOTE — PROGRESS NOTE ADULT - SUBJECTIVE AND OBJECTIVE BOX
KUN ROCHA    [id number]    63y      Female    INTERVAL HPI/OVERNIGHT EVENTS:    64 y/o female with PMHx of CAD s/p CABG x1 Stent placement x2,  PAD s/p balloon angioplasty, DM2, HTN, HLD came to the ED complaining of worsening dyspnea with minimal exertion and increasing abdominal girth over the past 2 weeks prior to admission. She usually ambulate with walker since R foot heel & 3 toes amputation last December without difficulty but lately has been having difficulty ambulating due to shortness of breath.     patient admitted to [facility] and seen by cardio in consult. patient started on iv diuretics and had tte which showed preserved ef and diastolic hf. patient transitioned to po torsemide bid and was going to be discharged but desaturated and discharge was postponed. Patient then became confused and abg performed which showed elevated co2 and placed on bipap with improvement of mentation. Pulmonary was consulted and sugar calderon need triology on dc    Patient had a rhc on 12/4 which showed elevated pressures. Patient is now devleoping ARF.     patient seen at bedside and states feeling ok      REVIEW OF SYSTEMS:    CONSTITUTIONAL: No fever, weight loss, or fatigue  RESPIRATORY: No cough, wheezing, hemoptysis; No shortness of breath  CARDIOVASCULAR: No chest pain, palpitations  GASTROINTESTINAL: No abdominal or epigastric pain. No nausea, vomiting  NEUROLOGICAL: No headaches, memory loss, loss of strength.  MISCELLANEOUS:      Vital Signs Last 24 Hrs  T(C): 36.8 (06 Dec 2018 10:18), Max: 37 (05 Dec 2018 21:37)  T(F): 98.3 (06 Dec 2018 10:18), Max: 98.6 (05 Dec 2018 21:37)  HR: 65 (06 Dec 2018 10:18) (65 - 70)  BP: 134/76 (06 Dec 2018 10:18) (110/65 - 136/63)  BP(mean): --  RR: 18 (06 Dec 2018 10:18) (18 - 24)  SpO2: 100% (06 Dec 2018 10:18) (95% - 100%)    PHYSICAL EXAM:    GENERAL: NAD   CHEST/LUNG: diminished   HEART: S1S2+, Regular rate and rhythm; No murmurs.  ABDOMEN: Soft, Nontender, Nondistended; Bowel sounds present.  Extremities: trace pedal edema, right toe amputation. old foot wound.  NEURO: AAOX3,   PSYCH: normal mood.      LABS:                        8.1    5.2   )-----------( 112      ( 06 Dec 2018 06:41 )             24.0     12-06    133<L>  |  94<L>  |  77.0<H>  ----------------------------<  89  5.4<H>   |  30.0<H>  |  2.77<H>    Ca    8.2<L>      06 Dec 2018 09:24  Mg     2.1     12-06      PT/INR - ( 05 Dec 2018 06:03 )   PT: 14.6 sec;   INR: 1.26 ratio         PTT - ( 05 Dec 2018 06:03 )  PTT:33.4 sec        MEDICATIONS  (STANDING):  aspirin  chewable 81 milliGRAM(s) Oral daily  atorvastatin 80 milliGRAM(s) Oral at bedtime  carvedilol 25 milliGRAM(s) Oral every 12 hours  clopidogrel Tablet 75 milliGRAM(s) Oral daily  dextrose 5%. 1000 milliLiter(s) (50 mL/Hr) IV Continuous <Continuous>  dextrose 50% Injectable 12.5 Gram(s) IV Push once  dextrose 50% Injectable 25 Gram(s) IV Push once  dextrose 50% Injectable 25 Gram(s) IV Push once  DOBUTamine Infusion 1 MICROgram(s)/kG/Min (2.925 mL/Hr) IV Continuous <Continuous>  docusate sodium 100 milliGRAM(s) Oral two times a day  DULoxetine 60 milliGRAM(s) Oral daily  enoxaparin Injectable 40 milliGRAM(s) SubCutaneous daily  gabapentin 100 milliGRAM(s) Oral three times a day  insulin glargine Injectable (LANTUS) 7 Unit(s) SubCutaneous at bedtime  insulin lispro (HumaLOG) corrective regimen sliding scale   SubCutaneous three times a day before meals  isosorbide   mononitrate ER Tablet (IMDUR) 60 milliGRAM(s) Oral daily  nitroglycerin    Patch 0.4 mG/Hr(s) 1 patch Transdermal daily  pantoprazole    Tablet 40 milliGRAM(s) Oral before breakfast  sodium chloride 0.9%. 1000 milliLiter(s) (100 mL/Hr) IV Continuous <Continuous>    MEDICATIONS  (PRN):  acetaminophen   Tablet .. 650 milliGRAM(s) Oral every 6 hours PRN Temp greater or equal to 38C (100.4F), Mild Pain (1 - 3)  bisacodyl Suppository 10 milliGRAM(s) Rectal daily PRN Constipation  dextrose 40% Gel 15 Gram(s) Oral once PRN Blood Glucose LESS THAN 70 milliGRAM(s)/deciliter  glucagon  Injectable 1 milliGRAM(s) IntraMuscular once PRN Glucose LESS THAN 70 milligrams/deciliter  lactulose Syrup 10 Gram(s) Oral three times a day PRN colnstipaiton      RADIOLOGY & ADDITIONAL TESTS:

## 2018-12-06 NOTE — PROGRESS NOTE ADULT - SUBJECTIVE AND OBJECTIVE BOX
Interventional Radiology Brief- Operative Note    Procedure: Right thoracentesis    Operators: Julio Rolle M.D.    Anesthesia (type): local    Contrast:  none    EBL: none    Findings/Follow up Plan of Care: moderate to large R effusion. 1260 cc fluid removed.     Specimens Removed: none    Implants: none    Complications: none    Condition/Disposition: to floor    Please call Interventional Radiology with any questions, concerns, or issues.

## 2018-12-06 NOTE — PROGRESS NOTE ADULT - ASSESSMENT
1) Acute metabolic encephalopathy --> secondary to CO2 narcosis  -->resolved  --> pulm following  --> spoke to daughter and was diagnosed with KYREE in the past but never followed up for a sleep study  --> will need triology on dc     2) acute on chronic diastolic heart failure --> cardio following  --> lasix on hold due to arf    3) ARF --> renal consult appreciated  --> hold all nephrotoxic agents    4) H/o CAD s/p CABG and stent- Stable,    5) H/o PAD- s/p balloon angioplasty in the past  --> , C/w ASA/ Plavix/ Lipitor.     6) H/o HTN /HLD- Continue home medications     7) H/o DM-2 - C/w Lantus, LSS + FS monitoring. Hold home metformin.     8) pul htn and right heart dysfunction --> agree with dobutamine  -->c ardio following     9) hyperkalemia -> kayexelate    10) thrombocytopenia --> will send hit panel   --> dc lovenox   --> scds

## 2018-12-06 NOTE — PROGRESS NOTE ADULT - ATTENDING COMMENTS
ARF- most likely MOLLY.   Continue dobutamine for now. Will consider stopping tomorrow. Losartan Dc'ed.

## 2018-12-07 ENCOUNTER — MOBILE ON CALL (OUTPATIENT)
Age: 63
End: 2018-12-07

## 2018-12-07 DIAGNOSIS — N17.9 ACUTE KIDNEY FAILURE, UNSPECIFIED: ICD-10-CM

## 2018-12-07 LAB
ANION GAP SERPL CALC-SCNC: 9 MMOL/L — SIGNIFICANT CHANGE UP (ref 5–17)
BLD GP AB SCN SERPL QL: SIGNIFICANT CHANGE UP
BUN SERPL-MCNC: 77 MG/DL — HIGH (ref 8–20)
CALCIUM SERPL-MCNC: 8.2 MG/DL — LOW (ref 8.6–10.2)
CHLORIDE SERPL-SCNC: 96 MMOL/L — LOW (ref 98–107)
CO2 SERPL-SCNC: 31 MMOL/L — HIGH (ref 22–29)
CREAT SERPL-MCNC: 2.38 MG/DL — HIGH (ref 0.5–1.3)
GAS PNL BLDA: SIGNIFICANT CHANGE UP
GLUCOSE BLDC GLUCOMTR-MCNC: 151 MG/DL — HIGH (ref 70–99)
GLUCOSE BLDC GLUCOMTR-MCNC: 152 MG/DL — HIGH (ref 70–99)
GLUCOSE BLDC GLUCOMTR-MCNC: 158 MG/DL — HIGH (ref 70–99)
GLUCOSE BLDC GLUCOMTR-MCNC: 165 MG/DL — HIGH (ref 70–99)
GLUCOSE BLDC GLUCOMTR-MCNC: 284 MG/DL — HIGH (ref 70–99)
GLUCOSE SERPL-MCNC: 154 MG/DL — HIGH (ref 70–115)
HCT VFR BLD CALC: 24 % — LOW (ref 37–47)
HGB BLD-MCNC: 8.2 G/DL — LOW (ref 12–16)
MAGNESIUM SERPL-MCNC: 2.2 MG/DL — SIGNIFICANT CHANGE UP (ref 1.6–2.6)
MCHC RBC-ENTMCNC: 27.2 PG — SIGNIFICANT CHANGE UP (ref 27–31)
MCHC RBC-ENTMCNC: 34.2 G/DL — SIGNIFICANT CHANGE UP (ref 32–36)
MCV RBC AUTO: 79.5 FL — LOW (ref 81–99)
PF4 HEPARIN CMPLX AB SER-ACNC: NEGATIVE — SIGNIFICANT CHANGE UP
PF4 HEPARIN CMPLX AB SERPL QL IA: 0.19 ABS — SIGNIFICANT CHANGE UP
PLATELET # BLD AUTO: 115 K/UL — LOW (ref 150–400)
POTASSIUM SERPL-MCNC: 4.3 MMOL/L — SIGNIFICANT CHANGE UP (ref 3.5–5.3)
POTASSIUM SERPL-SCNC: 4.3 MMOL/L — SIGNIFICANT CHANGE UP (ref 3.5–5.3)
RBC # BLD: 3.02 M/UL — LOW (ref 4.4–5.2)
RBC # FLD: 18.3 % — HIGH (ref 11–15.6)
SODIUM SERPL-SCNC: 136 MMOL/L — SIGNIFICANT CHANGE UP (ref 135–145)
TYPE + AB SCN PNL BLD: SIGNIFICANT CHANGE UP
WBC # BLD: 4.2 K/UL — LOW (ref 4.8–10.8)
WBC # FLD AUTO: 4.2 K/UL — LOW (ref 4.8–10.8)

## 2018-12-07 PROCEDURE — 99232 SBSQ HOSP IP/OBS MODERATE 35: CPT

## 2018-12-07 PROCEDURE — 76775 US EXAM ABDO BACK WALL LIM: CPT | Mod: 26

## 2018-12-07 PROCEDURE — 93010 ELECTROCARDIOGRAM REPORT: CPT

## 2018-12-07 PROCEDURE — 99291 CRITICAL CARE FIRST HOUR: CPT

## 2018-12-07 PROCEDURE — 71045 X-RAY EXAM CHEST 1 VIEW: CPT | Mod: 26

## 2018-12-07 RX ORDER — ERYTHROPOIETIN 10000 [IU]/ML
20000 INJECTION, SOLUTION INTRAVENOUS; SUBCUTANEOUS ONCE
Qty: 0 | Refills: 0 | Status: COMPLETED | OUTPATIENT
Start: 2018-12-07 | End: 2018-12-07

## 2018-12-07 RX ORDER — FUROSEMIDE 40 MG
40 TABLET ORAL
Qty: 0 | Refills: 0 | Status: DISCONTINUED | OUTPATIENT
Start: 2018-12-07 | End: 2018-12-11

## 2018-12-07 RX ADMIN — Medication 100 MILLIGRAM(S): at 19:53

## 2018-12-07 RX ADMIN — Medication 1 PATCH: at 19:29

## 2018-12-07 RX ADMIN — GABAPENTIN 100 MILLIGRAM(S): 400 CAPSULE ORAL at 13:06

## 2018-12-07 RX ADMIN — Medication 1 PATCH: at 02:00

## 2018-12-07 RX ADMIN — Medication 81 MILLIGRAM(S): at 11:15

## 2018-12-07 RX ADMIN — Medication 1: at 09:43

## 2018-12-07 RX ADMIN — ATORVASTATIN CALCIUM 80 MILLIGRAM(S): 80 TABLET, FILM COATED ORAL at 21:51

## 2018-12-07 RX ADMIN — Medication 40 MILLIGRAM(S): at 19:52

## 2018-12-07 RX ADMIN — CLOPIDOGREL BISULFATE 75 MILLIGRAM(S): 75 TABLET, FILM COATED ORAL at 11:16

## 2018-12-07 RX ADMIN — Medication 1: at 12:56

## 2018-12-07 RX ADMIN — GABAPENTIN 100 MILLIGRAM(S): 400 CAPSULE ORAL at 21:51

## 2018-12-07 RX ADMIN — CARVEDILOL PHOSPHATE 25 MILLIGRAM(S): 80 CAPSULE, EXTENDED RELEASE ORAL at 11:17

## 2018-12-07 RX ADMIN — GABAPENTIN 100 MILLIGRAM(S): 400 CAPSULE ORAL at 11:17

## 2018-12-07 RX ADMIN — DULOXETINE HYDROCHLORIDE 60 MILLIGRAM(S): 30 CAPSULE, DELAYED RELEASE ORAL at 12:56

## 2018-12-07 RX ADMIN — Medication 1 PATCH: at 12:57

## 2018-12-07 RX ADMIN — ISOSORBIDE MONONITRATE 60 MILLIGRAM(S): 60 TABLET, EXTENDED RELEASE ORAL at 11:17

## 2018-12-07 RX ADMIN — ERYTHROPOIETIN 20000 UNIT(S): 10000 INJECTION, SOLUTION INTRAVENOUS; SUBCUTANEOUS at 13:01

## 2018-12-07 RX ADMIN — Medication 100 MILLIGRAM(S): at 11:17

## 2018-12-07 RX ADMIN — INSULIN GLARGINE 7 UNIT(S): 100 INJECTION, SOLUTION SUBCUTANEOUS at 21:51

## 2018-12-07 RX ADMIN — CARVEDILOL PHOSPHATE 25 MILLIGRAM(S): 80 CAPSULE, EXTENDED RELEASE ORAL at 19:48

## 2018-12-07 RX ADMIN — PANTOPRAZOLE SODIUM 40 MILLIGRAM(S): 20 TABLET, DELAYED RELEASE ORAL at 11:16

## 2018-12-07 NOTE — PROGRESS NOTE ADULT - PROBLEM SELECTOR PLAN 1
Now with increasing symptoms of shortness of breath.   KEITH due to MOLLY most likely.   Discussed with Nephrology. Will restart diuretics. Lasix 40 mg IV bid.  Blood pressure elevated now but has been stable for the past few days. No changes in meds at this time.

## 2018-12-07 NOTE — PROGRESS NOTE ADULT - SUBJECTIVE AND OBJECTIVE BOX
KUN ROCHA    [id number]    63y      Female    INTERVAL HPI/OVERNIGHT EVENTS:    off service note:  64 y/o female with PMHx of CAD s/p CABG x1 Stent placement x2,  PAD s/p balloon angioplasty, DM2, HTN, HLD came to the ED complaining of worsening dyspnea with minimal exertion and increasing abdominal girth over the past 2 weeks prior to admission. She usually ambulate with walker since R foot heel & 3 toes amputation last December without difficulty but lately has been having difficulty ambulating due to shortness of breath.     patient admitted to medicine and seen by cardio, renal, pulmonary in consult. patient started on iv diuretics and had tte which showed preserved ef and diastolic hf. patient transitioned to po torsemide bid and was going to be discharged but desaturated and discharge was postponed. Patient then became confused and abg performed which showed elevated co2 and placed on bipap with improvement of mentation. Patient had a ct chest, abd and pelvis with IV contrast which showed pleural effusions.     Patient then had a RHC 12/4 which showed severe rv dysfunction and started on dobutamine. Patient then had a thoracentesis by IR yesterday with removal of 1260 cc fluid removed from the right lung.     Ealry this am patient was a rapid response for unresponsiveness and placed back on bipap. Patient seen at bedside and denies any complaints.       REVIEW OF SYSTEMS:    CONSTITUTIONAL: No fever, weight loss, or fatigue  RESPIRATORY: No cough, wheezing, hemoptysis; No shortness of breath  CARDIOVASCULAR: No chest pain, palpitations  GASTROINTESTINAL: No abdominal or epigastric pain. No nausea, vomiting  NEUROLOGICAL: No headaches, memory loss, loss of strength.  MISCELLANEOUS:      Vital Signs Last 24 Hrs  T(C): 36.4 (07 Dec 2018 10:19), Max: 36.7 (06 Dec 2018 17:17)  T(F): 97.6 (07 Dec 2018 10:19), Max: 98 (06 Dec 2018 17:17)  HR: 77 (07 Dec 2018 11:04) (64 - 77)  BP: 178/76 (07 Dec 2018 10:19) (120/70 - 178/76)  BP(mean): --  RR: 16 (07 Dec 2018 10:19) (16 - 18)  SpO2: 100% (07 Dec 2018 11:04) (96% - 100%)    PHYSICAL EXAM:    GENERAL: NAD, on bipap, awake and alert   NECK: soft, Supple, No JVD,   CHEST/LUNG: diminished, no wheezing   HEART: S1S2+, Regular rate and rhythm; No murmurs  ABDOMEN: Soft, Nontender, obese  Extremities: trace pedal edema, right toe amputation. old foot wound.  NEURO: AAOX3,   PSYCH: normal mood.      LABS:                        8.2    4.2   )-----------( 115      ( 07 Dec 2018 05:48 )             24.0     12-07    136  |  96<L>  |  77.0<H>  ----------------------------<  154<H>  4.3   |  31.0<H>  |  2.38<H>    Ca    8.2<L>      07 Dec 2018 05:48  Mg     2.2     12-07        MEDICATIONS  (STANDING):  aspirin  chewable 81 milliGRAM(s) Oral daily  atorvastatin 80 milliGRAM(s) Oral at bedtime  carvedilol 25 milliGRAM(s) Oral every 12 hours  clopidogrel Tablet 75 milliGRAM(s) Oral daily  dextrose 5%. 1000 milliLiter(s) (50 mL/Hr) IV Continuous <Continuous>  dextrose 50% Injectable 12.5 Gram(s) IV Push once  dextrose 50% Injectable 25 Gram(s) IV Push once  dextrose 50% Injectable 25 Gram(s) IV Push once  DOBUTamine Infusion 1 MICROgram(s)/kG/Min (2.925 mL/Hr) IV Continuous <Continuous>  docusate sodium 100 milliGRAM(s) Oral two times a day  DULoxetine 60 milliGRAM(s) Oral daily  epoetin sara Injectable 30698 Unit(s) SubCutaneous once  gabapentin 100 milliGRAM(s) Oral three times a day  insulin glargine Injectable (LANTUS) 7 Unit(s) SubCutaneous at bedtime  insulin lispro (HumaLOG) corrective regimen sliding scale   SubCutaneous three times a day before meals  isosorbide   mononitrate ER Tablet (IMDUR) 60 milliGRAM(s) Oral daily  nitroglycerin    Patch 0.4 mG/Hr(s) 1 patch Transdermal daily  pantoprazole    Tablet 40 milliGRAM(s) Oral before breakfast  sodium chloride 0.9%. 1000 milliLiter(s) (100 mL/Hr) IV Continuous <Continuous>    MEDICATIONS  (PRN):  acetaminophen   Tablet .. 650 milliGRAM(s) Oral every 6 hours PRN Temp greater or equal to 38C (100.4F), Mild Pain (1 - 3)  bisacodyl Suppository 10 milliGRAM(s) Rectal daily PRN Constipation  dextrose 40% Gel 15 Gram(s) Oral once PRN Blood Glucose LESS THAN 70 milliGRAM(s)/deciliter  glucagon  Injectable 1 milliGRAM(s) IntraMuscular once PRN Glucose LESS THAN 70 milligrams/deciliter  lactulose Syrup 10 Gram(s) Oral three times a day PRN colnstipaiton      RADIOLOGY & ADDITIONAL TESTS:

## 2018-12-07 NOTE — PROGRESS NOTE ADULT - SUBJECTIVE AND OBJECTIVE BOX
[place] CARDIOLOGY-[facility] Faculty Practice                                                        Office: [address] [address]                                                       Telephone: [phone]. Fax: [phone]      CC: Shortness of breath    INTERVAL HISTORY: Improved shortness of breath.     MEDICATIONS  (STANDING):  aspirin  chewable 81 milliGRAM(s) Oral daily  atorvastatin 80 milliGRAM(s) Oral at bedtime  carvedilol 25 milliGRAM(s) Oral every 12 hours  clopidogrel Tablet 75 milliGRAM(s) Oral daily  dextrose 5%. 1000 milliLiter(s) (50 mL/Hr) IV Continuous <Continuous>  dextrose 50% Injectable 12.5 Gram(s) IV Push once  dextrose 50% Injectable 25 Gram(s) IV Push once  dextrose 50% Injectable 25 Gram(s) IV Push once  DOBUTamine Infusion 1 MICROgram(s)/kG/Min (2.925 mL/Hr) IV Continuous <Continuous>  docusate sodium 100 milliGRAM(s) Oral two times a day  DULoxetine 60 milliGRAM(s) Oral daily  gabapentin 100 milliGRAM(s) Oral three times a day  insulin glargine Injectable (LANTUS) 7 Unit(s) SubCutaneous at bedtime  insulin lispro (HumaLOG) corrective regimen sliding scale   SubCutaneous three times a day before meals  isosorbide   mononitrate ER Tablet (IMDUR) 60 milliGRAM(s) Oral daily  nitroglycerin    Patch 0.4 mG/Hr(s) 1 patch Transdermal daily  pantoprazole    Tablet 40 milliGRAM(s) Oral before breakfast  sodium chloride 0.9%. 1000 milliLiter(s) (100 mL/Hr) IV Continuous <Continuous>    ROS: All others negative     PHYSICAL EXAM:  T(C): 36.4 (12-07-18 @ 10:19), Max: 36.7 (12-06-18 @ 17:17)  HR: 77 (12-07-18 @ 11:04) (64 - 77)  BP: 178/76 (12-07-18 @ 10:19) (120/70 - 178/76)  RR: 16 (12-07-18 @ 10:19) (16 - 18)  SpO2: 100% (12-07-18 @ 11:04) (96% - 100%)  Wt(kg): --  I&O's Summary    06 Dec 2018 07:01  -  07 Dec 2018 07:00  --------------------------------------------------------  IN: 0 mL / OUT: 300 mL / NET: -300 mL    07 Dec 2018 07:01  -  07 Dec 2018 14:52  --------------------------------------------------------  IN: 420 mL / OUT: 300 mL / NET: 120 mL      Appearance: Normal	  HEENT:   limited vision.   Lymphatic: No lymphadenopathy  Cardiovascular: Normal S1 S2, No JVD, No murmurs, No edema  Respiratory: decreased bilaterally.   Psychiatry: Alert  Gastrointestinal:  Soft, Non-tender, + BS	  Skin: No rashes, No ecchymoses, No cyanosis  Neurologic: Non-focal  Extremities: Normal range of motion, No clubbing, cyanosis. + edema.   Vascular: Peripheral pulses palpable 2+ bilaterally    TELEMETRY: 	      LABS:	 	                        8.2    4.2   )-----------( 115      ( 07 Dec 2018 05:48 )             24.0     12-07    136  |  96<L>  |  77.0<H>  ----------------------------<  154<H>  4.3   |  31.0<H>  |  2.38<H>    Ca    8.2<L>      07 Dec 2018 05:48  Mg     2.2     12-07

## 2018-12-07 NOTE — PROGRESS NOTE ADULT - SUBJECTIVE AND OBJECTIVE BOX
PULMONARY PROGRESS NOTE      RANJIT ROCHA-[id number]    Patient is a 63y old  Female who presents with a chief complaint of Shortness of breath (06 Dec 2018 17:39)      INTERVAL HPI/OVERNIGHT EVENTS:Events of last night noted.  Currently on BiPAP awake alert.    MEDICATIONS  (STANDING):  aspirin  chewable 81 milliGRAM(s) Oral daily  atorvastatin 80 milliGRAM(s) Oral at bedtime  carvedilol 25 milliGRAM(s) Oral every 12 hours  clopidogrel Tablet 75 milliGRAM(s) Oral daily  dextrose 5%. 1000 milliLiter(s) (50 mL/Hr) IV Continuous <Continuous>  dextrose 50% Injectable 12.5 Gram(s) IV Push once  dextrose 50% Injectable 25 Gram(s) IV Push once  dextrose 50% Injectable 25 Gram(s) IV Push once  DOBUTamine Infusion 1 MICROgram(s)/kG/Min (2.925 mL/Hr) IV Continuous <Continuous>  docusate sodium 100 milliGRAM(s) Oral two times a day  DULoxetine 60 milliGRAM(s) Oral daily  gabapentin 100 milliGRAM(s) Oral three times a day  insulin glargine Injectable (LANTUS) 7 Unit(s) SubCutaneous at bedtime  insulin lispro (HumaLOG) corrective regimen sliding scale   SubCutaneous three times a day before meals  isosorbide   mononitrate ER Tablet (IMDUR) 60 milliGRAM(s) Oral daily  nitroglycerin    Patch 0.4 mG/Hr(s) 1 patch Transdermal daily  pantoprazole    Tablet 40 milliGRAM(s) Oral before breakfast  sodium chloride 0.9%. 1000 milliLiter(s) (100 mL/Hr) IV Continuous <Continuous>      MEDICATIONS  (PRN):  acetaminophen   Tablet .. 650 milliGRAM(s) Oral every 6 hours PRN Temp greater or equal to 38C (100.4F), Mild Pain (1 - 3)  bisacodyl Suppository 10 milliGRAM(s) Rectal daily PRN Constipation  dextrose 40% Gel 15 Gram(s) Oral once PRN Blood Glucose LESS THAN 70 milliGRAM(s)/deciliter  glucagon  Injectable 1 milliGRAM(s) IntraMuscular once PRN Glucose LESS THAN 70 milligrams/deciliter  lactulose Syrup 10 Gram(s) Oral three times a day PRN colnstipaiton      Allergies    Accupril (Other)    Intolerances        PAST MEDICAL & SURGICAL HISTORY:  Herniated disc, cervical  PAD (peripheral artery disease)  Legally blind  History of peripheral vascular disease  History of retinal detachment  History of CEA (carotid endarterectomy): Right  Hyperlipidemia  Glaucoma  Hypertension  Diabetes  S/P CABG x 1  After cataract  Uveitic glaucoma of both eyes  Detached retina  Atherosclerosis of right carotid artery   delivery delivered      SOCIAL HISTORY  Smoking History:       REVIEW OF SYSTEMS:    CONSTITUTIONAL:  No distress    HEENT:  Eyes:  No diplopia or blurred vision. ENT:  No earache, sore throat or runny nose.    CARDIOVASCULAR:  No pressure, squeezing, tightness, heaviness or aching about the chest; no palpitations.    RESPIRATORY:  No cough, shortness of breath, PND or orthopnea. Mild SOBOE    GASTROINTESTINAL:  No nausea, vomiting or diarrhea.    GENITOURINARY:  No dysuria, frequency or urgency.    MUSCULOSKELETAL:  No joint pain    SKIN:  No new lesions.    NEUROLOGIC:  No paresthesias, fasciculations, seizures or weakness.    PSYCHIATRIC:  No disorder of thought or mood.    ENDOCRINE:  No heat or cold intolerance, polyuria or polydipsia.    HEMATOLOGICAL:  No easy bruising or bleeding.     Vital Signs Last 24 Hrs  T(C): 36.6 (06 Dec 2018 22:52), Max: 36.8 (06 Dec 2018 10:18)  T(F): 97.9 (06 Dec 2018 22:52), Max: 98.3 (06 Dec 2018 10:18)  HR: 65 (07 Dec 2018 05:05) (64 - 65)  BP: 142/68 (07 Dec 2018 05:05) (120/70 - 148/70)  BP(mean): --  RR: 17 (07 Dec 2018 05:05) (16 - 18)  SpO2: 99% (07 Dec 2018 05:05) (97% - 100%)    PHYSICAL EXAMINATION:    GENERAL: The patient is awake and alert on BiPAP    HEENT: Head is normocephalic and atraumatic. Extraocular muscles are intact. Mucous membranes are moist.    NECK: Supple.    LUNGS: Clear to auscultation without wheezing, rales or rhonchi; respirations unlabored    HEART: Regular rate and rhythm without murmur.    ABDOMEN: Soft, nontender, and nondistended.      EXTREMITIES: Without any cyanosis, clubbing, rash, lesions or edema.    NEUROLOGIC: Grossly intact.    SKIN: No ulceration or induration present.      LABS:                        8.2    4.2   )-----------( 115      ( 07 Dec 2018 05:48 )             24.0         136  |  96<L>  |  77.0<H>  ----------------------------<  154<H>  4.3   |  31.0<H>  |  2.38<H>    Ca    8.2<L>      07 Dec 2018 05:48  Mg     2.2               ABG - ( 07 Dec 2018 04:52 )  pH, Arterial: 7.36  pH, Blood: x     /  pCO2: 62    /  pO2: 85    / HCO3: 32    / Base Excess: 8.4   /  SaO2: 97    (BiPAP 14/7)                          MICROBIOLOGY:    RADIOLOGY & ADDITIONAL STUDIES:< from: Xray Chest 1 View- PORTABLE-Urgent (18 @ 06:49) >  FINDINGS:    Single frontal view of the chest demonstrates mild to moderate CHF. Small   to moderate right-sided pleural effusion. Mediastinal sternotomy wires.   The cardiomediastinal silhouette is enlarged. No acute osseous   abnormalities. Overlying EKG leads and wires are noted.    IMPRESSION: Mild to moderate CHF.      < end of copied text >  < from: IR US Guided Needle Placement (18 @ 13:45) >  Description of procedure/findings:     Informed consent was obtained. Ultrasound images of the right hemithorax   were obtained and saved to PACS. There is a moderate to large right   pleural effusion. Under direct ultrasound guidance, a 5 Spanish   singlestick catheter was advanced into the ascites. 1260 cc clear reynaldo   fluid was removed.    Impression:    Right thoracentesis (1260 cc fluid removed).      < end of copied text >

## 2018-12-07 NOTE — PROGRESS NOTE ADULT - ASSESSMENT
Imp--resp failure unresponsiveness resolved with BiPAP.  ?due to fluid shifts after thoracentesis  Plan--will need nocturnal BiPAP or NIPPV at home.  Can try off BiPAP for now during day.  Will need sleep studies.

## 2018-12-07 NOTE — PROGRESS NOTE ADULT - ASSESSMENT
ARF: likely renal hypoperfusion due to diuretics  CHF (EF= 70%; mild pulm HTN)==> diastolic dysfunction  Renal sono suboptimal study  - avoid potential nephrotoxins  - check urine studies  - daily weights  - will need diuretics restarted at some point  - repeat renal sonogram  - trend labs    Anemia:   - check Fe stores and serum immunofixation  - add QUENTIN  - trend labs

## 2018-12-07 NOTE — RESPIRATORY CARE EMERGENCY NOTE - [facility] RESPIRATORY COMMENTS
pt reuesting to come off BiPAP at 0400 and wanting to go to the bathroom. Pt noted to become unresponsive about 7 minutes later and was not breathing. Pt given 100% oxygen with ambu bag. Pt started breathing and talking about 30 seconds later. Placed back on BiPAP 14/7 .40 FiO2 as per TAMANNA Skelton. ABG performed and results in [place]..Will continue to monitor pt requesting to come off BiPAP at 0400 and wanting to go to the bathroom. Pt noted to become unresponsive about 7 minutes later and was not breathing. Pt given 100% oxygen with ambu bag. Pt started breathing and talking about 30 seconds later. Placed back on BiPAP 14/7 .40 FiO2 as per TAMANNA Skelton. ABG performed and results in [place]..Will continue to monitor pt requesting to come off BiPAP at 0400 and wanting to go to the bathroom. Pt noted to become lethargic then unresponsive after ambulating to restroom with aid.  Pt given 100% oxygen with ambu bag. Pt started breathing and talking about 30 seconds later. Placed back on BiPAP 14/7 .40 FiO2 as per TAMANNA Skelton. ABG performed and results in [place]..Will continue to monitor

## 2018-12-07 NOTE — PROGRESS NOTE ADULT - SUBJECTIVE AND OBJECTIVE BOX
NEPHROLOGY INTERVAL HPI/OVERNIGHT EVENTS:  pt comfortable when seen earlier  no acute distress    MEDICATIONS  (STANDING):  aspirin  chewable 81 milliGRAM(s) Oral daily  atorvastatin 80 milliGRAM(s) Oral at bedtime  carvedilol 25 milliGRAM(s) Oral every 12 hours  clopidogrel Tablet 75 milliGRAM(s) Oral daily  dextrose 5%. 1000 milliLiter(s) (50 mL/Hr) IV Continuous <Continuous>  dextrose 50% Injectable 12.5 Gram(s) IV Push once  dextrose 50% Injectable 25 Gram(s) IV Push once  dextrose 50% Injectable 25 Gram(s) IV Push once  DOBUTamine Infusion 1 MICROgram(s)/kG/Min (2.925 mL/Hr) IV Continuous <Continuous>  docusate sodium 100 milliGRAM(s) Oral two times a day  DULoxetine 60 milliGRAM(s) Oral daily  gabapentin 100 milliGRAM(s) Oral three times a day  insulin glargine Injectable (LANTUS) 7 Unit(s) SubCutaneous at bedtime  insulin lispro (HumaLOG) corrective regimen sliding scale   SubCutaneous three times a day before meals  isosorbide   mononitrate ER Tablet (IMDUR) 60 milliGRAM(s) Oral daily  nitroglycerin    Patch 0.4 mG/Hr(s) 1 patch Transdermal daily  pantoprazole    Tablet 40 milliGRAM(s) Oral before breakfast  sodium chloride 0.9%. 1000 milliLiter(s) (100 mL/Hr) IV Continuous <Continuous>    MEDICATIONS  (PRN):  acetaminophen   Tablet .. 650 milliGRAM(s) Oral every 6 hours PRN Temp greater or equal to 38C (100.4F), Mild Pain (1 - 3)  bisacodyl Suppository 10 milliGRAM(s) Rectal daily PRN Constipation  dextrose 40% Gel 15 Gram(s) Oral once PRN Blood Glucose LESS THAN 70 milliGRAM(s)/deciliter  glucagon  Injectable 1 milliGRAM(s) IntraMuscular once PRN Glucose LESS THAN 70 milligrams/deciliter  lactulose Syrup 10 Gram(s) Oral three times a day PRN colnstipaiton      Allergies    Accupril (Other)    Intolerances        Vital Signs Last 24 Hrs  T(C): 36.6 (06 Dec 2018 22:52), Max: 36.8 (06 Dec 2018 10:18)  T(F): 97.9 (06 Dec 2018 22:52), Max: 98.3 (06 Dec 2018 10:18)  HR: 65 (07 Dec 2018 05:05) (64 - 65)  BP: 142/68 (07 Dec 2018 05:05) (120/70 - 148/70)  BP(mean): --  RR: 17 (07 Dec 2018 05:05) (16 - 18)  SpO2: 99% (07 Dec 2018 05:05) (97% - 100%)    PHYSICAL EXAM:  GENERAL: NAD  NECK: Supple, +JVD  NERVOUS SYSTEM:  Alert & Oriented X3  CHEST/LUNG: Diminished bilateral BS  HEART: Regular rate and rhythm; no rub  ABDOMEN: Soft, Nontender,+ distention; +BS  EXTREMITIES:  + LE edema    LABS:                        8.2    4.2   )-----------( 115      ( 07 Dec 2018 05:48 )             24.0     Hemoglobin: 8.1 g/dL (..18 @ 06:41)            136  |  96<L>  |  77.0<H>  ----------------------------<  154<H>  4.3   |  31.0<H>  |  2.38<H>    Albumin, Serum: 3.5 g/dL (11.25.18 @ 19:39)    Creatinine, Serum: 1.06 mg/dL (12.03.18 @ 07:10)    Creatinine, Serum: 0.98 mg/dL (12.02.18 @ 08:54)    Ca    8.2<L>      07 Dec 2018 05:48  Mg     2.2               RADIOLOGY & ADDITIONAL TESTS:  < from: Xray Chest 1 View- PORTABLE-Urgent (18 @ 06:49) >   EXAM:  XR CHEST PORTABLE URGENT 1V                          PROCEDURE DATE:  2018          INTERPRETATION:  TECHNIQUE: Single portable view of the chest.    COMPARISON: 2018    CLINICAL HISTORY: Shortness of breath, food with altered mental status.     FINDINGS:    Single frontal view of the chest demonstrates mild to moderate CHF. Small   to moderate right-sided pleural effusion. Mediastinal sternotomy wires.   The cardiomediastinal silhouette is enlarged. No acute osseous   abnormalities. Overlying EKG leads and wires are noted.    IMPRESSION: Mild to moderate CHF.    < end of copied text >      < from: US Renal (18 @ 21:10) >     EXAM:  US KIDNEY(S)                          PROCEDURE DATE:  2018          INTERPRETATION:  Clinical indication: KEITH.    Technique: Ultrasound of the kidneys and bladder was performed.      Comparison:  CT 2018.    Findings: The study was suboptimal due to patient's body habitus, soft   tissue edema and bowel gas.    Right kidney: Measures 10.5 cm in length. No hydronephrosis. Right renal   stones noted on the CT are not well seen on this study.  Left kidney: Not well visualized, especially in the upper pole.   Bladder: Partially distended.  Additional: Bilateral pleural effusions again noted.    Impression:      Suboptimal study as described.    < end of copied text >    < from: TTE Echo Complete w/Doppler (18 @ 08:52) >    EXAM:  ECHO TRANSTHORACIC COMP W DOPP      PROCEDURE DATE:  2018   .      INTERPRETATION:  REPORT:    TRANSTHORACIC ECHOCARDIOGRAM REPORT         Patient Name:   KUN ROCHA Patient Location: Inpatient  Medical Rec #:  [id number]         Accession #:      [id number]  Account #:      [id number]            Height:           65.0 in 165.1 cm  YOB: 1955             Weight:           214.9 lb 97.50 kg  Patient Age:    63 years              BSA:              2.04 m²  Patient Gender: F                     BP:               137/68 mmHg       Date of Exam:        2018 8:52:40 AM  Sonographer:         Say Nayak  Referring Physician: Lilli Durham MD    Procedure:     2D Echo/Doppler/Color Doppler Complete.  Indications:   Dyspnea, unspecified - R06.00  Diagnosis:     Dyspnea, unspecified - R06.00  Study Details: Technically limited study.         2D AND M-MODE MEASUREMENTS (normal ranges within parentheses):  Left                Normal    Aorta/Left     Normal  Ventricle:                    Atrium:  IVSd (2D):    1.20  (0.7-1.1) Left Atrium  4.19 cm (1.9-4.0)                cm              (2D):  LVPWd (2D):   1.11  (0.7-1.1) LA Volume    45.1                cm              Index        ml/m²  LVIDd (2D):   4.43  (3.4-5.7) Right Ventricle:                cm              RVd (2D):        3.70 cm  LVIDs (2D):   2.69            TAPSE:           1.30 cm                cm  LV FS (2D):   39.3  (>25%)                %  Relative Wall 0.50  (<0.42)  Thickness    LV SYSTOLIC FUNCTION BY 2D PLANIMETRY (MOD):  EF-Biplane: 68 %    LV DIASTOLIC FUNCTION:  MV Peak E: 1.03 m/s e', MV Heather: 0.09 m/s  MV Peak A: 0.64 m/s E/e' Ratio: 11.98  E/A Ratio: 1.62     Decel Time: 187 msec    SPECTRAL DOPPLER ANALYSIS (where applicable):  Mitral Valve:  MV P1/2 Time: 54.12 msec  MV Area, PHT: 4.06 cm²    Aortic Valve: AoV Max Marcial: 1.40 m/s AoV Peak P.9 mmHg AoV Mean PG:   3.2 mmHg    LVOT Vmax: 1.04 m/s LVOT VTI: 0.250 m LVOT Diameter:    AoV Area, Vmax:  AoV Area, VTI:  AoV Area, Vmn:  Ao VTI: 0.348  Tricuspid Valve and PA/RV Systolic Pressure: TR Max Velocity: 3.07 m/s RA   Pressure: 8 mmHg RVSP/PASP: 45.7 mmHg       PHYSICIAN INTERPRETATION:  Left Ventricle: The left ventricular internal cavity size is normal.  Global LV systolic function was normal. Left ventricular ejection   fraction, by visual estimation, is 65 to 70%. The interventricular septum   is flattened in systole and diastole, consistent with right ventricular   pressure and volume overload. Spectral Doppler shows normal pattern of LV   diastolic filling.  Right Ventricle: The right ventricular size is moderately enlarged. RV   systolic function is moderately reduced.  Left Atrium: Severely enlarged left atrium.  Right Atrium: Moderately enlarged right atrium.  Pericardium: There is no evidence of pericardial effusion. There is a   small pleural effusion in both left and right lateral regions.  Mitral Valve: Thickening of the anterior and posterior mitral valve   leaflets. Moderate mitral valve regurgitation is seen.  Tricuspid Valve: Moderate tricuspid regurgitation is visualized.   Estimated pulmonary artery systolic pressure is 45.7 mmHg assuming a   right atrial pressure of 8 mmHg, which is consistent with mild pulmonary   hypertension.  Aortic Valve: The aortic valve is trileaflet. Sclerotic aortic valve with   normal opening. Peak Av velocity is 1.40 m/s, mean transaortic gradient   equals 3.2 mmHg. No evidence of aortic valve regurgitation is seen.  Pulmonic Valve: Structurally normal pulmonic valve, with normal leaflet   excursion. Trace pulmonic valve regurgitation.  Aorta: The aortic root is normal in size and structure.  Pulmonary Artery: The main pulmonary artery is normal in size.  Venous: The inferior vena cava was dilated, with respiratory size   variation less than 50%.       Summary:   1. Left ventricular ejection fraction, by visual estimation, is 65 to   70%.   2. Normal global left ventricular systolic function.   3. There is mild concentric left ventricular hypertrophy.   4. Moderately enlarged right ventricle.   5. Moderately reduced RV systolic function.   6. There is no evidence of pericardial effusion.   7. Moderate mitral valve regurgitation.   8. Thickening of the anterior and posterior mitral valve leaflets.   9. Moderate tricuspid regurgitation.  10. Sclerotic aortic valve with normal opening.  11. Trace pulmonic valve regurgitation.  12. Estimated pulmonary artery systolic pressure is 45.7 mmHg assuming a   right atrial pressure of 8 mmHg, which is consistent with mild pulmonary   hypertension.    < end of copied text >

## 2018-12-07 NOTE — PROGRESS NOTE ADULT - ASSESSMENT
1) Acute metabolic encephalopathy --> secondary to CO2 narcosis  -->resolved  --> pulm following  --> spoke to daughter and was diagnosed with KYREE in the past but never followed up for a sleep study  --> will need triology on dc     2) acute on chronic diastolic heart failure --> cardio following  --> lasix on hold due to arf  -> c.w dobutamine     3) ARF --> renal following  --> likley secondary to iv contrast  --> renal us negative  --> hold all nephrotoxic agents  --> fluids    4) H/o CAD s/p CABG and stent- Stable,    5) H/o PAD- s/p balloon angioplasty in the past  --> , C/w ASA/ Plavix/ Lipitor.     6) H/o HTN /HLD- Continue home medications     7) H/o DM-2 - C/w Lantus, LSS + FS monitoring. Hold home metformin.     8) pul htn and right heart dysfunction -->dobutamine   -->c ardio following     9) hyperkalemia -> resolved    10) thrombocytopenia --> improved  --> follow up HIT panel   --> scds    prognosis guarded  --> full code  will need triology on dc

## 2018-12-08 DIAGNOSIS — I10 ESSENTIAL (PRIMARY) HYPERTENSION: ICD-10-CM

## 2018-12-08 DIAGNOSIS — J96.92 RESPIRATORY FAILURE, UNSPECIFIED WITH HYPERCAPNIA: ICD-10-CM

## 2018-12-08 DIAGNOSIS — E11.9 TYPE 2 DIABETES MELLITUS WITHOUT COMPLICATIONS: ICD-10-CM

## 2018-12-08 LAB
ANION GAP SERPL CALC-SCNC: 9 MMOL/L — SIGNIFICANT CHANGE UP (ref 5–17)
APPEARANCE UR: CLEAR — SIGNIFICANT CHANGE UP
BACTERIA # UR AUTO: ABNORMAL
BILIRUB UR-MCNC: NEGATIVE — SIGNIFICANT CHANGE UP
BUN SERPL-MCNC: 75 MG/DL — HIGH (ref 8–20)
CALCIUM SERPL-MCNC: 8.3 MG/DL — LOW (ref 8.6–10.2)
CHLORIDE SERPL-SCNC: 94 MMOL/L — LOW (ref 98–107)
CO2 SERPL-SCNC: 34 MMOL/L — HIGH (ref 22–29)
COLOR SPEC: YELLOW — SIGNIFICANT CHANGE UP
CREAT SERPL-MCNC: 1.89 MG/DL — HIGH (ref 0.5–1.3)
DIFF PNL FLD: ABNORMAL
EPI CELLS # UR: SIGNIFICANT CHANGE UP
FERRITIN SERPL-MCNC: 116 NG/ML — SIGNIFICANT CHANGE UP (ref 15–150)
GLUCOSE BLDC GLUCOMTR-MCNC: 163 MG/DL — HIGH (ref 70–99)
GLUCOSE BLDC GLUCOMTR-MCNC: 163 MG/DL — HIGH (ref 70–99)
GLUCOSE BLDC GLUCOMTR-MCNC: 189 MG/DL — HIGH (ref 70–99)
GLUCOSE BLDC GLUCOMTR-MCNC: 231 MG/DL — HIGH (ref 70–99)
GLUCOSE SERPL-MCNC: 183 MG/DL — HIGH (ref 70–115)
GLUCOSE UR QL: NEGATIVE MG/DL — SIGNIFICANT CHANGE UP
HCT VFR BLD CALC: 24.8 % — LOW (ref 37–47)
HGB BLD-MCNC: 8.5 G/DL — LOW (ref 12–16)
IRON SATN MFR SERPL: 10 % — LOW (ref 14–50)
IRON SATN MFR SERPL: 36 UG/DL — LOW (ref 37–145)
KETONES UR-MCNC: NEGATIVE — SIGNIFICANT CHANGE UP
LEUKOCYTE ESTERASE UR-ACNC: ABNORMAL
MAGNESIUM SERPL-MCNC: 2.3 MG/DL — SIGNIFICANT CHANGE UP (ref 1.6–2.6)
MCHC RBC-ENTMCNC: 27.3 PG — SIGNIFICANT CHANGE UP (ref 27–31)
MCHC RBC-ENTMCNC: 34.3 G/DL — SIGNIFICANT CHANGE UP (ref 32–36)
MCV RBC AUTO: 79.7 FL — LOW (ref 81–99)
NITRITE UR-MCNC: NEGATIVE — SIGNIFICANT CHANGE UP
PH UR: 5 — SIGNIFICANT CHANGE UP (ref 5–8)
PLATELET # BLD AUTO: 135 K/UL — LOW (ref 150–400)
POTASSIUM SERPL-MCNC: 4.2 MMOL/L — SIGNIFICANT CHANGE UP (ref 3.5–5.3)
POTASSIUM SERPL-SCNC: 4.2 MMOL/L — SIGNIFICANT CHANGE UP (ref 3.5–5.3)
PROT UR-MCNC: 30 MG/DL
RBC # BLD: 3.11 M/UL — LOW (ref 4.4–5.2)
RBC # FLD: 18.3 % — HIGH (ref 11–15.6)
RBC CASTS # UR COMP ASSIST: SIGNIFICANT CHANGE UP /HPF (ref 0–4)
SODIUM SERPL-SCNC: 137 MMOL/L — SIGNIFICANT CHANGE UP (ref 135–145)
SP GR SPEC: 1.01 — SIGNIFICANT CHANGE UP (ref 1.01–1.02)
TIBC SERPL-MCNC: 352 UG/DL — SIGNIFICANT CHANGE UP (ref 220–430)
TRANSFERRIN SERPL-MCNC: 246 MG/DL — SIGNIFICANT CHANGE UP (ref 192–382)
UROBILINOGEN FLD QL: 4 MG/DL
WBC # BLD: 4.1 K/UL — LOW (ref 4.8–10.8)
WBC # FLD AUTO: 4.1 K/UL — LOW (ref 4.8–10.8)
WBC UR QL: ABNORMAL

## 2018-12-08 PROCEDURE — 71045 X-RAY EXAM CHEST 1 VIEW: CPT | Mod: 26

## 2018-12-08 PROCEDURE — 99232 SBSQ HOSP IP/OBS MODERATE 35: CPT

## 2018-12-08 PROCEDURE — 99233 SBSQ HOSP IP/OBS HIGH 50: CPT

## 2018-12-08 RX ORDER — ERYTHROPOIETIN 10000 [IU]/ML
40000 INJECTION, SOLUTION INTRAVENOUS; SUBCUTANEOUS
Qty: 0 | Refills: 0 | Status: DISCONTINUED | OUTPATIENT
Start: 2018-12-08 | End: 2018-12-26

## 2018-12-08 RX ADMIN — CARVEDILOL PHOSPHATE 25 MILLIGRAM(S): 80 CAPSULE, EXTENDED RELEASE ORAL at 16:57

## 2018-12-08 RX ADMIN — PANTOPRAZOLE SODIUM 40 MILLIGRAM(S): 20 TABLET, DELAYED RELEASE ORAL at 06:17

## 2018-12-08 RX ADMIN — Medication 100 MILLIGRAM(S): at 06:17

## 2018-12-08 RX ADMIN — Medication 1 PATCH: at 11:38

## 2018-12-08 RX ADMIN — Medication 1: at 08:02

## 2018-12-08 RX ADMIN — Medication 1 PATCH: at 23:10

## 2018-12-08 RX ADMIN — Medication 40 MILLIGRAM(S): at 06:17

## 2018-12-08 RX ADMIN — Medication 100 MILLIGRAM(S): at 16:57

## 2018-12-08 RX ADMIN — Medication 1 PATCH: at 19:18

## 2018-12-08 RX ADMIN — CARVEDILOL PHOSPHATE 25 MILLIGRAM(S): 80 CAPSULE, EXTENDED RELEASE ORAL at 08:02

## 2018-12-08 RX ADMIN — INSULIN GLARGINE 7 UNIT(S): 100 INJECTION, SOLUTION SUBCUTANEOUS at 23:11

## 2018-12-08 RX ADMIN — GABAPENTIN 100 MILLIGRAM(S): 400 CAPSULE ORAL at 11:39

## 2018-12-08 RX ADMIN — ATORVASTATIN CALCIUM 80 MILLIGRAM(S): 80 TABLET, FILM COATED ORAL at 23:11

## 2018-12-08 RX ADMIN — GABAPENTIN 100 MILLIGRAM(S): 400 CAPSULE ORAL at 06:17

## 2018-12-08 RX ADMIN — Medication 1: at 12:05

## 2018-12-08 RX ADMIN — CLOPIDOGREL BISULFATE 75 MILLIGRAM(S): 75 TABLET, FILM COATED ORAL at 11:39

## 2018-12-08 RX ADMIN — Medication 1 PATCH: at 00:02

## 2018-12-08 RX ADMIN — Medication 81 MILLIGRAM(S): at 11:39

## 2018-12-08 RX ADMIN — Medication 2: at 16:56

## 2018-12-08 RX ADMIN — ERYTHROPOIETIN 40000 UNIT(S): 10000 INJECTION, SOLUTION INTRAVENOUS; SUBCUTANEOUS at 12:53

## 2018-12-08 RX ADMIN — ISOSORBIDE MONONITRATE 60 MILLIGRAM(S): 60 TABLET, EXTENDED RELEASE ORAL at 11:38

## 2018-12-08 RX ADMIN — Medication 40 MILLIGRAM(S): at 16:57

## 2018-12-08 RX ADMIN — GABAPENTIN 100 MILLIGRAM(S): 400 CAPSULE ORAL at 23:11

## 2018-12-08 RX ADMIN — DULOXETINE HYDROCHLORIDE 60 MILLIGRAM(S): 30 CAPSULE, DELAYED RELEASE ORAL at 11:39

## 2018-12-08 NOTE — PROGRESS NOTE ADULT - PROBLEM SELECTOR PLAN 1
- SOB improving since lasix has been restarted.   - Urine output recorded only 300 cc since yesterday, may benefit from fontenot insertion for 24 hours to monitor UOP.  - Repeat CXR to evaluate pulmonary vascular congestion,  - Discussed with Nephrology at this time, defer lasix gtt, continue lasix 40mg IV BID.   - KEITH due to MOLLY most likely.   - No changes in meds at this time

## 2018-12-08 NOTE — PROGRESS NOTE ADULT - ASSESSMENT
ARF: likely renal hypoperfusion due to diuretics  CHF (EF= 70%; mild pulm HTN)==> diastolic dysfunction  Renal sono repeated and unremarkable  Kidney function improved off diuretics but now has developed PVC and worsened edema==> diuretics restarted  - avoid potential nephrotoxins  - await urine studies  - daily weights  - will need to achieve and maintain azotemia; may eventually need dialysis    Anemia:   - await Fe stores and serum immunofixation  - added QUENTIN  - trend labs

## 2018-12-08 NOTE — PROGRESS NOTE ADULT - ASSESSMENT
62 y/o female with CAD s/p CABG and PCI, PAD s/p balloon angioplasty, diabetes mellitus, hypertension, hyperlipidemia admitted with complaints of worsening exertional shortness of breath. She was evaluated by cardiology and started on IV diuretics, for acute on chronic diastolic CHF. Stress test was done, showed inferior ischemia, medical management advised. She was noted to have CO2 narcosis, placed on IV. Cardiology planned for right heart catheterisation given RV dysfunction. Dobutamine was given for RV dysfunction. She was noted to have contrast induced nephropathy, diuretics were held. Nephrology consulted, Losartan was held. IR was consulted for pleural effusion, underwent right thoracentesis. RRT was called, for lethargy, related to hypercapnia, improved with NIV. Diuretics were reinstated given worsening symptoms.

## 2018-12-08 NOTE — PROGRESS NOTE ADULT - ASSESSMENT
Imp--resp status improved  Prob KYREE/OHS.  Restrictive disease from obesity.  Plan--needs noct BiPAP at home  Needs sleep studies.

## 2018-12-08 NOTE — PROGRESS NOTE ADULT - SUBJECTIVE AND OBJECTIVE BOX
NEPHROLOGY INTERVAL HPI/OVERNIGHT EVENTS:  pt seated in chair when seen earlier  No acute distress  no cp, sob, n/v/d    MEDICATIONS  (STANDING):  aspirin  chewable 81 milliGRAM(s) Oral daily  atorvastatin 80 milliGRAM(s) Oral at bedtime  carvedilol 25 milliGRAM(s) Oral every 12 hours  clopidogrel Tablet 75 milliGRAM(s) Oral daily  dextrose 5%. 1000 milliLiter(s) (50 mL/Hr) IV Continuous <Continuous>  dextrose 50% Injectable 12.5 Gram(s) IV Push once  dextrose 50% Injectable 25 Gram(s) IV Push once  dextrose 50% Injectable 25 Gram(s) IV Push once  docusate sodium 100 milliGRAM(s) Oral two times a day  DULoxetine 60 milliGRAM(s) Oral daily  furosemide   Injectable 40 milliGRAM(s) IV Push two times a day  gabapentin 100 milliGRAM(s) Oral three times a day  insulin glargine Injectable (LANTUS) 7 Unit(s) SubCutaneous at bedtime  insulin lispro (HumaLOG) corrective regimen sliding scale   SubCutaneous three times a day before meals  isosorbide   mononitrate ER Tablet (IMDUR) 60 milliGRAM(s) Oral daily  nitroglycerin    Patch 0.4 mG/Hr(s) 1 patch Transdermal daily  pantoprazole    Tablet 40 milliGRAM(s) Oral before breakfast  sodium chloride 0.9%. 1000 milliLiter(s) (100 mL/Hr) IV Continuous <Continuous>    MEDICATIONS  (PRN):  acetaminophen   Tablet .. 650 milliGRAM(s) Oral every 6 hours PRN Temp greater or equal to 38C (100.4F), Mild Pain (1 - 3)  bisacodyl Suppository 10 milliGRAM(s) Rectal daily PRN Constipation  dextrose 40% Gel 15 Gram(s) Oral once PRN Blood Glucose LESS THAN 70 milliGRAM(s)/deciliter  glucagon  Injectable 1 milliGRAM(s) IntraMuscular once PRN Glucose LESS THAN 70 milligrams/deciliter  lactulose Syrup 10 Gram(s) Oral three times a day PRN colnstipaiton      Allergies    Accupril (Other)    Intolerances      Vital Signs Last 24 Hrs  T(C): 36.9 (08 Dec 2018 06:31), Max: 36.9 (07 Dec 2018 21:43)  T(F): 98.4 (08 Dec 2018 06:31), Max: 98.4 (07 Dec 2018 21:43)  HR: 66 (08 Dec 2018 06:31) (63 - 77)  BP: 115/78 (08 Dec 2018 06:31) (100/49 - 178/76)  BP(mean): --  RR: 18 (08 Dec 2018 00:56) (16 - 18)  SpO2: 96% (08 Dec 2018 06:31) (96% - 100%)    PHYSICAL EXAM:  GENERAL: Obese  HEENT: opacified R eye  NECK: Supple, +JVD  NERVOUS SYSTEM:  Alert & Oriented X3  CHEST/LUNG: Diminished bilateral BS  HEART: Regular rate and rhythm; no rub  ABDOMEN: Soft, Nontender,+ distention; +BS; + flank edema  EXTREMITIES:  + LE edema    LABS:                        8.2    4.2   )-----------( 115      ( 07 Dec 2018 05:48 )             24.0     Total Hemoglobin, Calculated: 8.8 g/dL (18 @ 04:52)            136  |  96<L>  |  77.0<H>  ----------------------------<  154<H>  4.3   |  31.0<H>  |  2.38<H>    Creatinine, Serum: 2.77 mg/dL (18 @ 09:24)        Ca    8.2<L>      07 Dec 2018 05:48  Mg     2.2                   RADIOLOGY & ADDITIONAL TESTS:  < from: US Renal (18 @ 18:08) >   EXAM:  US KIDNEY(S)                          PROCEDURE DATE:  2018          INTERPRETATION:  CLINICAL HISTORY: Acute renal failure.     TECHNIQUE: Sonogram of the kidneys  COMPARISON: 2018    FINDINGS: The right kidney is normal in echogenicity and size, measuring   12.9 cm in longitudinal dimension. No right hydronephrosis, mass or   calculi is visualized.     The left kidney is normal in echogenicity and size, measuring 11.4 cm in   longitudinal dimension. No left hydronephrosis, mass or calculi is   visualized.       IMPRESSION:  Unremarkable renal sonography.     < end of copied text >      < from: TTE Echo Complete w/Doppler (18 @ 08:52) >    EXAM:  ECHO TRANSTHORACIC COMP W DOPP      PROCEDURE DATE:  2018   .      INTERPRETATION:  REPORT:    TRANSTHORACIC ECHOCARDIOGRAM REPORT         Patient Name:   KUN ROCHA Patient Location: [facility] Rec #:  [id number]         Accession #:      [id number]  Account #:      [id number]            Height:           65.0 in 165.1 cm  YOB: 1955             Weight:           214.9 lb 97.50 kg  Patient Age:    63 years              BSA:              2.04 m²  Patient Gender: F                     BP:               137/68 mmHg       Date of Exam:        2018 8:52:40 AM  Sonographer:         Say Nayak  Referring Physician: Lilli Durham MD    Procedure:     2D Echo/Doppler/Color Doppler Complete.  Indications:   Dyspnea, unspecified - R06.00  Diagnosis:     Dyspnea, unspecified - R06.00  Study Details: Technically limited study.         2D AND M-MODE MEASUREMENTS (normal ranges within parentheses):  Left                Normal    Aorta/Left     Normal  Ventricle:                    Atrium:  IVSd (2D):    1.20  (0.7-1.1) Left Atrium  4.19 cm (1.9-4.0)                cm              (2D):  LVPWd (2D):   1.11  (0.7-1.1) LA Volume    45.1                cm              Index        ml/m²  LVIDd (2D):   4.43  (3.4-5.7) Right Ventricle:                cm              RVd (2D):        3.70 cm  LVIDs (2D):   2.69            TAPSE:           1.30 cm                cm  LV FS (2D):   39.3  (>25%)                %  Relative Wall 0.50  (<0.42)  Thickness    LV SYSTOLIC FUNCTION BY 2D PLANIMETRY (MOD):  EF-Biplane: 68 %    LV DIASTOLIC FUNCTION:  MV Peak E: 1.03 m/s e', MV Heather: 0.09 m/s  MV Peak A: 0.64 m/s E/e' Ratio: 11.98  E/A Ratio: 1.62     Decel Time: 187 msec    SPECTRAL DOPPLER ANALYSIS (where applicable):  Mitral Valve:  MV P1/2 Time: 54.12 msec  MV Area, PHT: 4.06 cm²    Aortic Valve: AoV Max Marcial: 1.40 m/s AoV Peak P.9 mmHg AoV Mean PG:   3.2 mmHg    LVOT Vmax: 1.04 m/s LVOT VTI: 0.250 m LVOT Diameter:    AoV Area, Vmax:  AoV Area, VTI:  AoV Area, Vmn:  Ao VTI: 0.348  Tricuspid Valve and PA/RV Systolic Pressure: TR Max Velocity: 3.07 m/s RA   Pressure: 8 mmHg RVSP/PASP: 45.7 mmHg       PHYSICIAN INTERPRETATION:  Left Ventricle: The left ventricular internal cavity size is normal.  Global LV systolic function was normal. Left ventricular ejection   fraction, by visual estimation, is 65 to 70%. The interventricular septum   is flattened in systole and diastole, consistent with right ventricular   pressure and volume overload. Spectral Doppler shows normal pattern of LV   diastolic filling.  Right Ventricle: The right ventricular size is moderately enlarged. RV   systolic function is moderately reduced.  Left Atrium: Severely enlarged left atrium.  Right Atrium: Moderately enlarged right atrium.  Pericardium: There is no evidence of pericardial effusion. There is a   small pleural effusion in both left and right lateral regions.  Mitral Valve: Thickening of the anterior and posterior mitral valve   leaflets. Moderate mitral valve regurgitation is seen.  Tricuspid Valve: Moderate tricuspid regurgitation is visualized.   Estimated pulmonary artery systolic pressure is 45.7 mmHg assuming a   right atrial pressure of 8 mmHg, which is consistent with mild pulmonary   hypertension.  Aortic Valve: The aortic valve is trileaflet. Sclerotic aortic valve with   normal opening. Peak Av velocity is 1.40 m/s, mean transaortic gradient   equals 3.2 mmHg. No evidence of aortic valve regurgitation is seen.  Pulmonic Valve: Structurally normal pulmonic valve, with normal leaflet   excursion. Trace pulmonic valve regurgitation.  Aorta: The aortic root is normal in size and structure.  Pulmonary Artery: The main pulmonary artery is normal in size.  Venous: The inferior vena cava was dilated, with respiratory size   variation less than 50%.       Summary:   1. Left ventricular ejection fraction, by visual estimation, is 65 to   70%.   2. Normal global left ventricular systolic function.   3. There is mild concentric left ventricular hypertrophy.   4. Moderately enlarged right ventricle.   5. Moderately reduced RV systolic function.   6. There is no evidence of pericardial effusion.   7. Moderate mitral valve regurgitation.   8. Thickening of the anterior and posterior mitral valve leaflets.   9. Moderate tricuspid regurgitation.  10. Sclerotic aortic valve with normal opening.  11. Trace pulmonic valve regurgitation.  12. Estimated pulmonary artery systolic pressure is 45.7 mmHg assuming a   right atrial pressure of 8 mmHg, which is consistent with mild pulmonary   hypertension.    [id number] Phoenix Alvarado MD, Electronically signed on 2018 at 8:26:51   PM    < end of copied text >

## 2018-12-08 NOTE — PROGRESS NOTE ADULT - SUBJECTIVE AND OBJECTIVE BOX
INTERVAL HPI/OVERNIGHT EVENTS:    CC:    Chart and course reviewed. No overnight events, denies complaints.    Vital Signs Last 24 Hrs  T(C): 36.9 (08 Dec 2018 06:31), Max: 36.9 (07 Dec 2018 21:43)  T(F): 98.4 (08 Dec 2018 06:31), Max: 98.4 (07 Dec 2018 21:43)  HR: 66 (08 Dec 2018 06:31) (63 - 73)  BP: 115/78 (08 Dec 2018 06:31) (100/49 - 118/60)  BP(mean): --  RR: 18 (08 Dec 2018 00:56) (18 - 18)  SpO2: 96% (08 Dec 2018 06:31) (96% - 100%)    PHYSICAL EXAM:    GENERAL: Not in distress, obese female, sitting in chair  CHEST/LUNG: b/l air entry, coarse in bases  HEART: Regular   ABDOMEN: Soft, BS+  EXTREMITIES:  no edema, tenderness.    MEDICATIONS  (STANDING):  aspirin  chewable 81 milliGRAM(s) Oral daily  atorvastatin 80 milliGRAM(s) Oral at bedtime  carvedilol 25 milliGRAM(s) Oral every 12 hours  clopidogrel Tablet 75 milliGRAM(s) Oral daily  dextrose 5%. 1000 milliLiter(s) (50 mL/Hr) IV Continuous <Continuous>  dextrose 50% Injectable 12.5 Gram(s) IV Push once  dextrose 50% Injectable 25 Gram(s) IV Push once  dextrose 50% Injectable 25 Gram(s) IV Push once  docusate sodium 100 milliGRAM(s) Oral two times a day  DULoxetine 60 milliGRAM(s) Oral daily  epoetin sara Injectable 51081 Unit(s) SubCutaneous <User Schedule>  furosemide   Injectable 40 milliGRAM(s) IV Push two times a day  gabapentin 100 milliGRAM(s) Oral three times a day  insulin glargine Injectable (LANTUS) 7 Unit(s) SubCutaneous at bedtime  insulin lispro (HumaLOG) corrective regimen sliding scale   SubCutaneous three times a day before meals  isosorbide   mononitrate ER Tablet (IMDUR) 60 milliGRAM(s) Oral daily  nitroglycerin    Patch 0.4 mG/Hr(s) 1 patch Transdermal daily  pantoprazole    Tablet 40 milliGRAM(s) Oral before breakfast  sodium chloride 0.9%. 1000 milliLiter(s) (100 mL/Hr) IV Continuous <Continuous>    MEDICATIONS  (PRN):  acetaminophen   Tablet .. 650 milliGRAM(s) Oral every 6 hours PRN Temp greater or equal to 38C (100.4F), Mild Pain (1 - 3)  bisacodyl Suppository 10 milliGRAM(s) Rectal daily PRN Constipation  dextrose 40% Gel 15 Gram(s) Oral once PRN Blood Glucose LESS THAN 70 milliGRAM(s)/deciliter  glucagon  Injectable 1 milliGRAM(s) IntraMuscular once PRN Glucose LESS THAN 70 milligrams/deciliter  lactulose Syrup 10 Gram(s) Oral three times a day PRN colnstipaiton      Allergies    Accupril (Other)    Intolerances          LABS:                          8.5    4.1   )-----------( 135      ( 08 Dec 2018 09:38 )             24.8     12-08    137  |  94<L>  |  75.0<H>  ----------------------------<  183<H>  4.2   |  34.0<H>  |  1.89<H>    Ca    8.3<L>      08 Dec 2018 09:38  Mg     2.3     12-08            RADIOLOGY & ADDITIONAL TESTS:

## 2018-12-08 NOTE — PROGRESS NOTE ADULT - SUBJECTIVE AND OBJECTIVE BOX
PULMONARY PROGRESS NOTE      RANJIT ROCHA-[id number]    Patient is a 63y old  Female who presents with a chief complaint of Shortness of breath (08 Dec 2018 10:43)      INTERVAL HPI/OVERNIGHT EVENTS:awake alert comfortable.  on noct BiPAP    MEDICATIONS  (STANDING):  aspirin  chewable 81 milliGRAM(s) Oral daily  atorvastatin 80 milliGRAM(s) Oral at bedtime  carvedilol 25 milliGRAM(s) Oral every 12 hours  clopidogrel Tablet 75 milliGRAM(s) Oral daily  dextrose 5%. 1000 milliLiter(s) (50 mL/Hr) IV Continuous <Continuous>  dextrose 50% Injectable 12.5 Gram(s) IV Push once  dextrose 50% Injectable 25 Gram(s) IV Push once  dextrose 50% Injectable 25 Gram(s) IV Push once  docusate sodium 100 milliGRAM(s) Oral two times a day  DULoxetine 60 milliGRAM(s) Oral daily  epoetin sara Injectable 99206 Unit(s) SubCutaneous <User Schedule>  furosemide   Injectable 40 milliGRAM(s) IV Push two times a day  gabapentin 100 milliGRAM(s) Oral three times a day  insulin glargine Injectable (LANTUS) 7 Unit(s) SubCutaneous at bedtime  insulin lispro (HumaLOG) corrective regimen sliding scale   SubCutaneous three times a day before meals  isosorbide   mononitrate ER Tablet (IMDUR) 60 milliGRAM(s) Oral daily  nitroglycerin    Patch 0.4 mG/Hr(s) 1 patch Transdermal daily  pantoprazole    Tablet 40 milliGRAM(s) Oral before breakfast  sodium chloride 0.9%. 1000 milliLiter(s) (100 mL/Hr) IV Continuous <Continuous>      MEDICATIONS  (PRN):  acetaminophen   Tablet .. 650 milliGRAM(s) Oral every 6 hours PRN Temp greater or equal to 38C (100.4F), Mild Pain (1 - 3)  bisacodyl Suppository 10 milliGRAM(s) Rectal daily PRN Constipation  dextrose 40% Gel 15 Gram(s) Oral once PRN Blood Glucose LESS THAN 70 milliGRAM(s)/deciliter  glucagon  Injectable 1 milliGRAM(s) IntraMuscular once PRN Glucose LESS THAN 70 milligrams/deciliter  lactulose Syrup 10 Gram(s) Oral three times a day PRN colnstipaiton      Allergies    Accupril (Other)    Intolerances        PAST MEDICAL & SURGICAL HISTORY:  Herniated disc, cervical  PAD (peripheral artery disease)  Legally blind  History of peripheral vascular disease  History of retinal detachment  History of CEA (carotid endarterectomy): Right  Hyperlipidemia  Glaucoma  Hypertension  Diabetes  S/P CABG x 1  After cataract  Uveitic glaucoma of both eyes  Detached retina  Atherosclerosis of right carotid artery   delivery delivered      SOCIAL HISTORY  Smoking History:       REVIEW OF SYSTEMS:    CONSTITUTIONAL:  No distress    HEENT:  Eyes:  No diplopia or blurred vision. ENT:  No earache, sore throat or runny nose.    CARDIOVASCULAR:  No pressure, squeezing, tightness, heaviness or aching about the chest; no palpitations.    RESPIRATORY:  No cough, shortness of breath, PND or orthopnea. Mild SOBOE    GASTROINTESTINAL:  No nausea, vomiting or diarrhea.    GENITOURINARY:  No dysuria, frequency or urgency.    MUSCULOSKELETAL:  No joint pain    SKIN:  No new lesions.    NEUROLOGIC:  No paresthesias, fasciculations, seizures or weakness.    PSYCHIATRIC:  No disorder of thought or mood.    ENDOCRINE:  No heat or cold intolerance, polyuria or polydipsia.    HEMATOLOGICAL:  No easy bruising or bleeding.     Vital Signs Last 24 Hrs  T(C): 36.9 (08 Dec 2018 06:31), Max: 36.9 (07 Dec 2018 21:43)  T(F): 98.4 (08 Dec 2018 06:31), Max: 98.4 (07 Dec 2018 21:43)  HR: 66 (08 Dec 2018 06:31) (63 - 73)  BP: 115/78 (08 Dec 2018 06:31) (100/49 - 118/60)  BP(mean): --  RR: 18 (08 Dec 2018 00:56) (18 - 18)  SpO2: 96% (08 Dec 2018 06:31) (96% - 100%)    PHYSICAL EXAMINATION:    GENERAL: The patient is awake and alert in no apparent distress.     HEENT: Head is normocephalic and atraumatic. Extraocular muscles are intact. Mucous membranes are moist.    NECK: Supple.    LUNGS: Clear to auscultation without wheezing, rales or rhonchi; respirations unlabored    HEART: Regular rate and rhythm without murmur.    ABDOMEN: Soft, nontender, and nondistended.      EXTREMITIES: Without any cyanosis, clubbing, rash, lesions or edema.    NEUROLOGIC: Grossly intact.    SKIN: No ulceration or induration present.      LABS:                        8.5    4.1   )-----------( 135      ( 08 Dec 2018 09:38 )             24.8         137  |  94<L>  |  75.0<H>  ----------------------------<  183<H>  4.2   |  34.0<H>  |  1.89<H>    Ca    8.3<L>      08 Dec 2018 09:38  Mg     2.3               ABG - ( 07 Dec 2018 04:52 )  pH, Arterial: 7.36  pH, Blood: x     /  pCO2: 62    /  pO2: 85    / HCO3: 32    / Base Excess: 8.4   /  SaO2: 97                              MICROBIOLOGY:    RADIOLOGY & ADDITIONAL STUDIES:

## 2018-12-09 LAB
ANION GAP SERPL CALC-SCNC: 10 MMOL/L — SIGNIFICANT CHANGE UP (ref 5–17)
BUN SERPL-MCNC: 77 MG/DL — HIGH (ref 8–20)
CALCIUM SERPL-MCNC: 8.4 MG/DL — LOW (ref 8.6–10.2)
CHLORIDE SERPL-SCNC: 94 MMOL/L — LOW (ref 98–107)
CO2 SERPL-SCNC: 32 MMOL/L — HIGH (ref 22–29)
CREAT SERPL-MCNC: 1.69 MG/DL — HIGH (ref 0.5–1.3)
GLUCOSE BLDC GLUCOMTR-MCNC: 112 MG/DL — HIGH (ref 70–99)
GLUCOSE BLDC GLUCOMTR-MCNC: 195 MG/DL — HIGH (ref 70–99)
GLUCOSE BLDC GLUCOMTR-MCNC: 235 MG/DL — HIGH (ref 70–99)
GLUCOSE BLDC GLUCOMTR-MCNC: 236 MG/DL — HIGH (ref 70–99)
GLUCOSE SERPL-MCNC: 98 MG/DL — SIGNIFICANT CHANGE UP (ref 70–115)
HCT VFR BLD CALC: 24.7 % — LOW (ref 37–47)
HGB BLD-MCNC: 8.5 G/DL — LOW (ref 12–16)
MAGNESIUM SERPL-MCNC: 2.2 MG/DL — SIGNIFICANT CHANGE UP (ref 1.6–2.6)
MCHC RBC-ENTMCNC: 27.4 PG — SIGNIFICANT CHANGE UP (ref 27–31)
MCHC RBC-ENTMCNC: 34.4 G/DL — SIGNIFICANT CHANGE UP (ref 32–36)
MCV RBC AUTO: 79.7 FL — LOW (ref 81–99)
PLATELET # BLD AUTO: 142 K/UL — LOW (ref 150–400)
POTASSIUM SERPL-MCNC: 4.7 MMOL/L — SIGNIFICANT CHANGE UP (ref 3.5–5.3)
POTASSIUM SERPL-SCNC: 4.7 MMOL/L — SIGNIFICANT CHANGE UP (ref 3.5–5.3)
RBC # BLD: 3.1 M/UL — LOW (ref 4.4–5.2)
RBC # FLD: 18.2 % — HIGH (ref 11–15.6)
SODIUM SERPL-SCNC: 136 MMOL/L — SIGNIFICANT CHANGE UP (ref 135–145)
WBC # BLD: 5.4 K/UL — SIGNIFICANT CHANGE UP (ref 4.8–10.8)
WBC # FLD AUTO: 5.4 K/UL — SIGNIFICANT CHANGE UP (ref 4.8–10.8)

## 2018-12-09 PROCEDURE — 99232 SBSQ HOSP IP/OBS MODERATE 35: CPT

## 2018-12-09 RX ORDER — IRON SUCROSE 20 MG/ML
250 INJECTION, SOLUTION INTRAVENOUS
Qty: 0 | Refills: 0 | Status: COMPLETED | OUTPATIENT
Start: 2018-12-09 | End: 2018-12-12

## 2018-12-09 RX ADMIN — CARVEDILOL PHOSPHATE 25 MILLIGRAM(S): 80 CAPSULE, EXTENDED RELEASE ORAL at 06:01

## 2018-12-09 RX ADMIN — ISOSORBIDE MONONITRATE 60 MILLIGRAM(S): 60 TABLET, EXTENDED RELEASE ORAL at 12:32

## 2018-12-09 RX ADMIN — Medication 1 PATCH: at 18:04

## 2018-12-09 RX ADMIN — GABAPENTIN 100 MILLIGRAM(S): 400 CAPSULE ORAL at 21:38

## 2018-12-09 RX ADMIN — CLOPIDOGREL BISULFATE 75 MILLIGRAM(S): 75 TABLET, FILM COATED ORAL at 12:32

## 2018-12-09 RX ADMIN — IRON SUCROSE 112.5 MILLIGRAM(S): 20 INJECTION, SOLUTION INTRAVENOUS at 12:33

## 2018-12-09 RX ADMIN — GABAPENTIN 100 MILLIGRAM(S): 400 CAPSULE ORAL at 06:01

## 2018-12-09 RX ADMIN — Medication 100 MILLIGRAM(S): at 17:51

## 2018-12-09 RX ADMIN — Medication 2: at 16:35

## 2018-12-09 RX ADMIN — INSULIN GLARGINE 7 UNIT(S): 100 INJECTION, SOLUTION SUBCUTANEOUS at 21:37

## 2018-12-09 RX ADMIN — CARVEDILOL PHOSPHATE 25 MILLIGRAM(S): 80 CAPSULE, EXTENDED RELEASE ORAL at 17:51

## 2018-12-09 RX ADMIN — DULOXETINE HYDROCHLORIDE 60 MILLIGRAM(S): 30 CAPSULE, DELAYED RELEASE ORAL at 12:32

## 2018-12-09 RX ADMIN — PANTOPRAZOLE SODIUM 40 MILLIGRAM(S): 20 TABLET, DELAYED RELEASE ORAL at 06:01

## 2018-12-09 RX ADMIN — GABAPENTIN 100 MILLIGRAM(S): 400 CAPSULE ORAL at 13:29

## 2018-12-09 RX ADMIN — Medication 81 MILLIGRAM(S): at 12:32

## 2018-12-09 RX ADMIN — Medication 1: at 11:27

## 2018-12-09 RX ADMIN — ATORVASTATIN CALCIUM 80 MILLIGRAM(S): 80 TABLET, FILM COATED ORAL at 21:38

## 2018-12-09 RX ADMIN — Medication 40 MILLIGRAM(S): at 06:01

## 2018-12-09 RX ADMIN — Medication 40 MILLIGRAM(S): at 17:51

## 2018-12-09 RX ADMIN — Medication 1 PATCH: at 12:33

## 2018-12-09 RX ADMIN — Medication 100 MILLIGRAM(S): at 06:01

## 2018-12-09 NOTE — PROGRESS NOTE ADULT - SUBJECTIVE AND OBJECTIVE BOX
CARDIOLOGY PROGRESS NOTE   ([facility])                                                                                                        Subjective:  feels better, sitting in the chair, denied CP, walked to BR, feels improved    Vitals:  T(C): 36.8 (12-09-18 @ 08:33), Max: 37 (12-08-18 @ 23:09)  HR: 65 (12-09-18 @ 08:33) (61 - 65)  BP: 114/71 (12-09-18 @ 08:33) (114/71 - 135/78)  RR: 18 (12-09-18 @ 08:33) (18 - 18)  SpO2: 96% (12-09-18 @ 08:33) (96% - 99%)  Wt(kg): --  I&O's Summary        PHYSICAL EXAM:  Appearance: Normal	  HEENT:   Atraumatic  Cardiovascular: Normal S1 S2, No JVD, No murmurs, No edema  Respiratory: Lungs clear to auscultation	  Gastrointestinal:  Soft, Non-tender, + BS	  Skin: No rashes, No ecchymoses, No cyanosis  Neurologic: Alert and awake, able to move extremities  Extremities: +bl ankle edema  	      CURRENT MEDICATIONS:  carvedilol 25 milliGRAM(s) Oral every 12 hours  furosemide   Injectable 40 milliGRAM(s) IV Push two times a day  isosorbide   mononitrate ER Tablet (IMDUR) 60 milliGRAM(s) Oral daily  nitroglycerin    Patch 0.4 mG/Hr(s) 1 patch Transdermal daily        acetaminophen   Tablet .. 650 milliGRAM(s) Oral every 6 hours PRN  DULoxetine 60 milliGRAM(s) Oral daily  gabapentin 100 milliGRAM(s) Oral three times a day    bisacodyl Suppository 10 milliGRAM(s) Rectal daily PRN  docusate sodium 100 milliGRAM(s) Oral two times a day  lactulose Syrup 10 Gram(s) Oral three times a day PRN  pantoprazole    Tablet 40 milliGRAM(s) Oral before breakfast    atorvastatin 80 milliGRAM(s) Oral at bedtime  dextrose 40% Gel 15 Gram(s) Oral once PRN  dextrose 50% Injectable 12.5 Gram(s) IV Push once  dextrose 50% Injectable 25 Gram(s) IV Push once  dextrose 50% Injectable 25 Gram(s) IV Push once  glucagon  Injectable 1 milliGRAM(s) IntraMuscular once PRN  insulin glargine Injectable (LANTUS) 7 Unit(s) SubCutaneous at bedtime  insulin lispro (HumaLOG) corrective regimen sliding scale   SubCutaneous three times a day before meals    aspirin  chewable 81 milliGRAM(s) Oral daily  clopidogrel Tablet 75 milliGRAM(s) Oral daily  dextrose 5%. 1000 milliLiter(s) IV Continuous <Continuous>  epoetin sara Injectable 34698 Unit(s) SubCutaneous <User Schedule>  iron sucrose IVPB 250 milliGRAM(s) IV Intermittent <User Schedule>      LABS:	 	    CARDIAC MARKERS:  Troponin I:   CPK total =  CK MB=   CKMB index=                           8.5    5.4   )-----------( 142      ( 09 Dec 2018 09:05 )             24.7     12-09    136  |  94<L>  |  77.0<H>  ----------------------------<  98  4.7   |  32.0<H>  |  1.69<H>    Ca    8.4<L>      09 Dec 2018 09:05  Mg     2.2     12-09      proBNP:   Lipid Profile:   HgA1c:   TSH:

## 2018-12-09 NOTE — PROGRESS NOTE ADULT - SUBJECTIVE AND OBJECTIVE BOX
NEPHROLOGY INTERVAL HPI/OVERNIGHT EVENTS:  pt lying in bed, comfortable  no acute distress    MEDICATIONS  (STANDING):  aspirin  chewable 81 milliGRAM(s) Oral daily  atorvastatin 80 milliGRAM(s) Oral at bedtime  carvedilol 25 milliGRAM(s) Oral every 12 hours  clopidogrel Tablet 75 milliGRAM(s) Oral daily  dextrose 5%. 1000 milliLiter(s) (50 mL/Hr) IV Continuous <Continuous>  dextrose 50% Injectable 12.5 Gram(s) IV Push once  dextrose 50% Injectable 25 Gram(s) IV Push once  dextrose 50% Injectable 25 Gram(s) IV Push once  docusate sodium 100 milliGRAM(s) Oral two times a day  DULoxetine 60 milliGRAM(s) Oral daily  epoetin sara Injectable 19530 Unit(s) SubCutaneous <User Schedule>  furosemide   Injectable 40 milliGRAM(s) IV Push two times a day  gabapentin 100 milliGRAM(s) Oral three times a day  insulin glargine Injectable (LANTUS) 7 Unit(s) SubCutaneous at bedtime  insulin lispro (HumaLOG) corrective regimen sliding scale   SubCutaneous three times a day before meals  isosorbide   mononitrate ER Tablet (IMDUR) 60 milliGRAM(s) Oral daily  nitroglycerin    Patch 0.4 mG/Hr(s) 1 patch Transdermal daily  pantoprazole    Tablet 40 milliGRAM(s) Oral before breakfast  sodium chloride 0.9%. 1000 milliLiter(s) (100 mL/Hr) IV Continuous <Continuous>    MEDICATIONS  (PRN):  acetaminophen   Tablet .. 650 milliGRAM(s) Oral every 6 hours PRN Temp greater or equal to 38C (100.4F), Mild Pain (1 - 3)  bisacodyl Suppository 10 milliGRAM(s) Rectal daily PRN Constipation  dextrose 40% Gel 15 Gram(s) Oral once PRN Blood Glucose LESS THAN 70 milliGRAM(s)/deciliter  glucagon  Injectable 1 milliGRAM(s) IntraMuscular once PRN Glucose LESS THAN 70 milligrams/deciliter  lactulose Syrup 10 Gram(s) Oral three times a day PRN colnstipaiton      Allergies    Accupril (Other)          Vital Signs Last 24 Hrs  T(C): 36.8 (09 Dec 2018 08:33), Max: 37 (08 Dec 2018 23:09)  T(F): 98.2 (09 Dec 2018 08:33), Max: 98.6 (08 Dec 2018 23:09)  HR: 65 (09 Dec 2018 08:33) (61 - 65)  BP: 114/71 (09 Dec 2018 08:33) (114/71 - 135/78)  BP(mean): --  RR: 18 (09 Dec 2018 08:33) (18 - 18)  SpO2: 96% (09 Dec 2018 08:33) (96% - 99%)    PHYSICAL EXAM:  GENERAL: Obese; comfortable  HEENT: opacified R eye  NECK: Supple, +JVD  NERVOUS SYSTEM:  Alert & Oriented X3  CHEST/LUNG: Diminished bilateral BS  HEART: Regular rate and rhythm; no rub  ABDOMEN: Soft, Nontender,+ distention; +BS; + flank edema  EXTREMITIES:  + LE edema    LABS:                        8.5    5.4   )-----------( 142      ( 09 Dec 2018 09:05 )             24.7     12    137  |  94<L>  |  75.0<H>  ----------------------------<  183<H>  4.2   |  34.0<H>  |  1.89<H>    Creatinine, Serum: 2.38 mg/dL (..18 @ 05:48)    Carbon Dioxide, Serum: 31.0 mmol/L (12..18 @ 05:48)          Ca    8.3<L>      08 Dec 2018 09:38  Mg     2.3               Color: Yellow / Appearance: Clear / S.015 / pH: x  Gluc: x / Ketone: Negative  / Bili: Negative / Urobili: 4 mg/dL   Blood: x / Protein: 30 mg/dL / Nitrite: Negative   Leuk Esterase: Moderate / RBC: 0-2 /HPF / WBC 11-25   Sq Epi: x / Non Sq Epi: Few / Bacteria: Occasional      Magnesium, Serum: 2.3 mg/dL ( @ 09:38)      RADIOLOGY & ADDITIONAL TESTS:

## 2018-12-09 NOTE — PROGRESS NOTE ADULT - ASSESSMENT
62 y/o female with CAD s/p CABG and PCI, PAD s/p balloon angioplasty, diabetes mellitus, hypertension, hyperlipidemia admitted with complaints of worsening exertional shortness of breath. She was evaluated by cardiology and started on IV diuretics, for acute on chronic diastolic CHF. Stress test was done, showed inferior ischemia, medical management advised. She was noted to have CO2 narcosis, placed on IV. Cardiology planned for right heart catheterisation given RV dysfunction. Dobutamine was given for RV dysfunction. She was noted to have contrast induced nephropathy, diuretics were held. Nephrology consulted, Losartan was held. IR was consulted for pleural effusion, underwent right thoracentesis. RRT was called, for lethargy, related to hypercapnia, improved with NIV. Diuretics were reinstated given worsening symptoms. Her renal function improved. Venofer was added for anemia. PT consulted.

## 2018-12-09 NOTE — PROGRESS NOTE ADULT - ASSESSMENT
A/P    Acute on chronic diastolic congestive heart failure  SOB improving since lasix has been restarted.  Discussed with Nephrology, defer lasix gtt, continue lasix 40mg IV BID.  CXR 12/7: Resolution of pulmonary vascular congestion and perihilar pulmonary edema..      ARF  Likely renal hypoperfusion due to diuretics  Renal sono repeated and unremarkable  Kidney function improved off diuretics but developed PVC and worsened edema then diuretics now restarted  Already improved on diuretics  cr 1.69 (12/9)    Anemia   Add Venofer  Follow CBC    CAD  Continue aspirin, plavix.     Hypercapnic respiratory failure  resp status improved  Prob KYREE/OHS.  Restrictive disease from obesity.  noct BiPAP at home, sleep studies    Hypertension  Controlled on current regimen        CARDIO MEDS  carvedilol 25 milliGRAM(s) Oral every 12 hours  furosemide   Injectable 40 milliGRAM(s) IV Push two times a day  isosorbide   mononitrate ER Tablet (IMDUR) 60 milliGRAM(s) Oral daily  nitroglycerin    Patch 0.4 mG/Hr(s) 1 patch Transdermal daily  atorvastatin 80 milliGRAM(s) Oral at bedtime  aspirin  chewable 81 milliGRAM(s) Oral daily  clopidogrel Tablet 75 milliGRAM(s) Oral daily

## 2018-12-09 NOTE — PROGRESS NOTE ADULT - ASSESSMENT
ARF: likely renal hypoperfusion due to diuretics  CHF (EF= 70%; mild pulm HTN)==> diastolic dysfunction  Renal sono repeated and unremarkable  Kidney function improved off diuretics but developed PVC and worsened edema==> diuretics now restarted  - avoid potential nephrotoxins  - daily weights  - will need to tolerate azotemia; monitor alkalosis    Anemia: % Saturation, Iron: 10 % (12.08.18 @ 09:38); Ferritin, Serum: 116 ng/mL (12.08.18 @ 09:38)  - cont QUENTIN  - add Venofer  - trend labs

## 2018-12-09 NOTE — PROGRESS NOTE ADULT - SUBJECTIVE AND OBJECTIVE BOX
INTERVAL HPI/OVERNIGHT EVENTS:    CC: acute on chronic hypercapnic respiratory failure, diastolic heart failure, KEITH improving, diabetes mellitus    No overnight events, denies complaints.    Vital Signs Last 24 Hrs  T(C): 36.8 (09 Dec 2018 08:33), Max: 37 (08 Dec 2018 23:09)  T(F): 98.2 (09 Dec 2018 08:33), Max: 98.6 (08 Dec 2018 23:09)  HR: 65 (09 Dec 2018 08:33) (61 - 65)  BP: 114/71 (09 Dec 2018 08:33) (114/71 - 135/78)  BP(mean): --  RR: 18 (09 Dec 2018 08:33) (18 - 18)  SpO2: 96% (09 Dec 2018 08:33) (96% - 99%)    PHYSICAL EXAM:    GENERAL: Not in distress, sitting in chair  CHEST/LUNG: b/l air entry, decreased in bases  HEART: Regular   ABDOMEN: Soft, BS+  EXTREMITIES: No edema, tenderness.    MEDICATIONS  (STANDING):  aspirin  chewable 81 milliGRAM(s) Oral daily  atorvastatin 80 milliGRAM(s) Oral at bedtime  carvedilol 25 milliGRAM(s) Oral every 12 hours  clopidogrel Tablet 75 milliGRAM(s) Oral daily  dextrose 5%. 1000 milliLiter(s) (50 mL/Hr) IV Continuous <Continuous>  dextrose 50% Injectable 12.5 Gram(s) IV Push once  dextrose 50% Injectable 25 Gram(s) IV Push once  dextrose 50% Injectable 25 Gram(s) IV Push once  docusate sodium 100 milliGRAM(s) Oral two times a day  DULoxetine 60 milliGRAM(s) Oral daily  epoetin sara Injectable 14419 Unit(s) SubCutaneous <User Schedule>  furosemide   Injectable 40 milliGRAM(s) IV Push two times a day  gabapentin 100 milliGRAM(s) Oral three times a day  insulin glargine Injectable (LANTUS) 7 Unit(s) SubCutaneous at bedtime  insulin lispro (HumaLOG) corrective regimen sliding scale   SubCutaneous three times a day before meals  iron sucrose IVPB 250 milliGRAM(s) IV Intermittent <User Schedule>  isosorbide   mononitrate ER Tablet (IMDUR) 60 milliGRAM(s) Oral daily  nitroglycerin    Patch 0.4 mG/Hr(s) 1 patch Transdermal daily  pantoprazole    Tablet 40 milliGRAM(s) Oral before breakfast    MEDICATIONS  (PRN):  acetaminophen   Tablet .. 650 milliGRAM(s) Oral every 6 hours PRN Temp greater or equal to 38C (100.4F), Mild Pain (1 - 3)  bisacodyl Suppository 10 milliGRAM(s) Rectal daily PRN Constipation  dextrose 40% Gel 15 Gram(s) Oral once PRN Blood Glucose LESS THAN 70 milliGRAM(s)/deciliter  glucagon  Injectable 1 milliGRAM(s) IntraMuscular once PRN Glucose LESS THAN 70 milligrams/deciliter  lactulose Syrup 10 Gram(s) Oral three times a day PRN colnstipaiton      Allergies    Accupril (Other)    Intolerances          LABS:                          8.5    5.4   )-----------( 142      ( 09 Dec 2018 09:05 )             24.7     12-    136  |  94<L>  |  77.0<H>  ----------------------------<  98  4.7   |  32.0<H>  |  1.69<H>    Ca    8.4<L>      09 Dec 2018 09:05  Mg     2.2     12-        Urinalysis Basic - ( 08 Dec 2018 15:02 )    Color: Yellow / Appearance: Clear / S.015 / pH: x  Gluc: x / Ketone: Negative  / Bili: Negative / Urobili: 4 mg/dL   Blood: x / Protein: 30 mg/dL / Nitrite: Negative   Leuk Esterase: Moderate / RBC: 0-2 /HPF / WBC 11-25   Sq Epi: x / Non Sq Epi: Few / Bacteria: Occasional        RADIOLOGY & ADDITIONAL TESTS:

## 2018-12-09 NOTE — PROGRESS NOTE ADULT - PROBLEM SELECTOR PLAN 1
Continue IV Lasix, monitor I/O, cardiology and renal following. Respiratory status improving. Cardiology follow up noted.

## 2018-12-10 LAB
ANION GAP SERPL CALC-SCNC: 12 MMOL/L — SIGNIFICANT CHANGE UP (ref 5–17)
BASE EXCESS BLDA CALC-SCNC: 8.7 MMOL/L — HIGH (ref -2–2)
BUN SERPL-MCNC: 77 MG/DL — HIGH (ref 8–20)
CALCIUM SERPL-MCNC: 8.5 MG/DL — LOW (ref 8.6–10.2)
CHLORIDE SERPL-SCNC: 95 MMOL/L — LOW (ref 98–107)
CO2 SERPL-SCNC: 29 MMOL/L — SIGNIFICANT CHANGE UP (ref 22–29)
CREAT SERPL-MCNC: 1.63 MG/DL — HIGH (ref 0.5–1.3)
GAS PNL BLDA: SIGNIFICANT CHANGE UP
GLUCOSE BLDC GLUCOMTR-MCNC: 160 MG/DL — HIGH (ref 70–99)
GLUCOSE BLDC GLUCOMTR-MCNC: 197 MG/DL — HIGH (ref 70–99)
GLUCOSE BLDC GLUCOMTR-MCNC: 209 MG/DL — HIGH (ref 70–99)
GLUCOSE BLDC GLUCOMTR-MCNC: 342 MG/DL — HIGH (ref 70–99)
GLUCOSE SERPL-MCNC: 189 MG/DL — HIGH (ref 70–115)
HCO3 BLDA-SCNC: 32 MMOL/L — HIGH (ref 20–26)
HOROWITZ INDEX BLDA+IHG-RTO: SIGNIFICANT CHANGE UP
IGA FLD-MCNC: 132 MG/DL — SIGNIFICANT CHANGE UP (ref 84–499)
IGG FLD-MCNC: 2659 MG/DL — HIGH (ref 610–1660)
IGM SERPL-MCNC: 38 MG/DL — SIGNIFICANT CHANGE UP (ref 35–242)
KAPPA LC SER QL IFE: 51.96 MG/DL — HIGH (ref 0.33–1.94)
KAPPA/LAMBDA FREE LIGHT CHAIN RATIO, SERUM: 10.19 RATIO — HIGH (ref 0.26–1.65)
LAMBDA LC SER QL IFE: 5.1 MG/DL — HIGH (ref 0.57–2.63)
MAGNESIUM SERPL-MCNC: 2.3 MG/DL — SIGNIFICANT CHANGE UP (ref 1.6–2.6)
PCO2 BLDA: 63 MMHG — HIGH (ref 35–45)
PH BLDA: 7.36 — SIGNIFICANT CHANGE UP (ref 7.35–7.45)
PHOSPHATE SERPL-MCNC: 3.9 MG/DL — SIGNIFICANT CHANGE UP (ref 2.4–4.7)
PO2 BLDA: 71 MMHG — LOW (ref 83–108)
POTASSIUM SERPL-MCNC: 5 MMOL/L — SIGNIFICANT CHANGE UP (ref 3.5–5.3)
POTASSIUM SERPL-SCNC: 5 MMOL/L — SIGNIFICANT CHANGE UP (ref 3.5–5.3)
SAO2 % BLDA: 94 % — LOW (ref 95–99)
SODIUM SERPL-SCNC: 136 MMOL/L — SIGNIFICANT CHANGE UP (ref 135–145)

## 2018-12-10 PROCEDURE — 99232 SBSQ HOSP IP/OBS MODERATE 35: CPT

## 2018-12-10 PROCEDURE — 99233 SBSQ HOSP IP/OBS HIGH 50: CPT

## 2018-12-10 RX ORDER — ATORVASTATIN CALCIUM 80 MG/1
40 TABLET, FILM COATED ORAL AT BEDTIME
Qty: 0 | Refills: 0 | Status: DISCONTINUED | OUTPATIENT
Start: 2018-12-10 | End: 2018-12-26

## 2018-12-10 RX ORDER — INSULIN LISPRO 100/ML
3 VIAL (ML) SUBCUTANEOUS ONCE
Qty: 0 | Refills: 0 | Status: COMPLETED | OUTPATIENT
Start: 2018-12-10 | End: 2018-12-10

## 2018-12-10 RX ADMIN — Medication 1: at 08:07

## 2018-12-10 RX ADMIN — Medication 2: at 11:54

## 2018-12-10 RX ADMIN — DULOXETINE HYDROCHLORIDE 60 MILLIGRAM(S): 30 CAPSULE, DELAYED RELEASE ORAL at 11:54

## 2018-12-10 RX ADMIN — Medication 100 MILLIGRAM(S): at 17:44

## 2018-12-10 RX ADMIN — GABAPENTIN 100 MILLIGRAM(S): 400 CAPSULE ORAL at 06:09

## 2018-12-10 RX ADMIN — CLOPIDOGREL BISULFATE 75 MILLIGRAM(S): 75 TABLET, FILM COATED ORAL at 11:54

## 2018-12-10 RX ADMIN — GABAPENTIN 100 MILLIGRAM(S): 400 CAPSULE ORAL at 22:50

## 2018-12-10 RX ADMIN — Medication 1: at 16:46

## 2018-12-10 RX ADMIN — Medication 40 MILLIGRAM(S): at 06:09

## 2018-12-10 RX ADMIN — CARVEDILOL PHOSPHATE 25 MILLIGRAM(S): 80 CAPSULE, EXTENDED RELEASE ORAL at 17:44

## 2018-12-10 RX ADMIN — Medication 1 PATCH: at 06:10

## 2018-12-10 RX ADMIN — ISOSORBIDE MONONITRATE 60 MILLIGRAM(S): 60 TABLET, EXTENDED RELEASE ORAL at 11:54

## 2018-12-10 RX ADMIN — Medication 81 MILLIGRAM(S): at 11:54

## 2018-12-10 RX ADMIN — Medication 100 MILLIGRAM(S): at 06:09

## 2018-12-10 RX ADMIN — CARVEDILOL PHOSPHATE 25 MILLIGRAM(S): 80 CAPSULE, EXTENDED RELEASE ORAL at 06:09

## 2018-12-10 RX ADMIN — Medication 40 MILLIGRAM(S): at 17:44

## 2018-12-10 RX ADMIN — IRON SUCROSE 112.5 MILLIGRAM(S): 20 INJECTION, SOLUTION INTRAVENOUS at 09:54

## 2018-12-10 RX ADMIN — PANTOPRAZOLE SODIUM 40 MILLIGRAM(S): 20 TABLET, DELAYED RELEASE ORAL at 06:09

## 2018-12-10 RX ADMIN — INSULIN GLARGINE 7 UNIT(S): 100 INJECTION, SOLUTION SUBCUTANEOUS at 22:51

## 2018-12-10 RX ADMIN — GABAPENTIN 100 MILLIGRAM(S): 400 CAPSULE ORAL at 14:33

## 2018-12-10 RX ADMIN — ATORVASTATIN CALCIUM 40 MILLIGRAM(S): 80 TABLET, FILM COATED ORAL at 22:50

## 2018-12-10 RX ADMIN — Medication 3 UNIT(S): at 22:51

## 2018-12-10 NOTE — CHART NOTE - NSCHARTNOTEFT_GEN_A_CORE
Source: Patient [ ]  Family [ ]   other [ ]    Current Diet:   Diet, Consistent Carbohydrate/No Snacks:   DASH/TLC {Sodium & Cholesteral Restricted} (11-27-18 @ 11:35)    Patient reports [ ] nausea  [ ] vomiting [ ] diarrhea [ ] constipation  [ ]chewing problems [ ] swallowing issues  [ ] other:     PO intake:  < 50% [ ]   50-75%  [ ]   %  [X]  other :    Source for PO intake [ ] Patient [ ] family [X] chart [ ] staff [ ] other    Current Weight:   (12/8)  240lbs  (12/2)  215lbs    % Weight Change: 25lb weight gain noted    Pertinent Medications: MEDICATIONS  (STANDING):  aspirin  chewable 81 milliGRAM(s) Oral daily  atorvastatin 40 milliGRAM(s) Oral at bedtime  carvedilol 25 milliGRAM(s) Oral every 12 hours  clopidogrel Tablet 75 milliGRAM(s) Oral daily  dextrose 5%. 1000 milliLiter(s) (50 mL/Hr) IV Continuous <Continuous>  dextrose 50% Injectable 12.5 Gram(s) IV Push once  dextrose 50% Injectable 25 Gram(s) IV Push once  dextrose 50% Injectable 25 Gram(s) IV Push once  docusate sodium 100 milliGRAM(s) Oral two times a day  DULoxetine 60 milliGRAM(s) Oral daily  epoetin sara Injectable 74728 Unit(s) SubCutaneous <User Schedule>  furosemide   Injectable 40 milliGRAM(s) IV Push two times a day  gabapentin 100 milliGRAM(s) Oral three times a day  insulin glargine Injectable (LANTUS) 7 Unit(s) SubCutaneous at bedtime  insulin lispro (HumaLOG) corrective regimen sliding scale   SubCutaneous three times a day before meals  iron sucrose IVPB 250 milliGRAM(s) IV Intermittent <User Schedule>  isosorbide   mononitrate ER Tablet (IMDUR) 60 milliGRAM(s) Oral daily  pantoprazole    Tablet 40 milliGRAM(s) Oral before breakfast    MEDICATIONS  (PRN):  acetaminophen   Tablet .. 650 milliGRAM(s) Oral every 6 hours PRN Temp greater or equal to 38C (100.4F), Mild Pain (1 - 3)  bisacodyl Suppository 10 milliGRAM(s) Rectal daily PRN Constipation  dextrose 40% Gel 15 Gram(s) Oral once PRN Blood Glucose LESS THAN 70 milliGRAM(s)/deciliter  glucagon  Injectable 1 milliGRAM(s) IntraMuscular once PRN Glucose LESS THAN 70 milligrams/deciliter  lactulose Syrup 10 Gram(s) Oral three times a day PRN colnstipaiton    Pertinent Labs: CBC Full  -  ( 09 Dec 2018 09:05 )  WBC Count : 5.4 K/uL  Hemoglobin : 8.5 g/dL  Hematocrit : 24.7 %  Platelet Count - Automated : 142 K/uL  Mean Cell Volume : 79.7 fl  Mean Cell Hemoglobin : 27.4 pg  Mean Cell Hemoglobin Concentration : 34.4 g/dL  12-10 Na136 mmol/L Glu 189 mg/dL<H> K+ 5.0 mmol/L Cr  1.63 mg/dL<H> BUN 77.0 mg/dL<H> Phos 3.9 mg/dL Alb n/a   PAB n/a       Skin: R. heel unstageable; surgical incision to back    Nutrition focused physical exam conducted - found signs of malnutrition [ ]absent [ ]present    Subcutaneous fat loss: [ ] Orbital fat pads region, [ ]Buccal fat region, [ ]Triceps region,  [ ]Ribs region    Muscle wasting: [ ]Temples region, [ ]Clavicle region, [ ]Shoulder region, [ ]Scapula region, [ ]Interosseous region,  [ ]thigh region, [ ]Calf region    Estimated Needs:   [X] no change since previous assessment  [ ] recalculated:     Current Nutrition Diagnosis:    Recommendations:     Monitoring and Evaluation:   [ ] PO intake [ ] Tolerance to diet prescription [X] Weights  [X] Follow up per protocol [X] Labs: Source: Patient [X]  Family [ ]   other [ ]    Current Diet:   Diet, Consistent Carbohydrate/No Snacks:   DASH/TLC {Sodium & Cholesteral Restricted} (11-27-18 @ 11:35)    Patient reports [ ] nausea  [ ] vomiting [ ] diarrhea [ ] constipation  [ ]chewing problems [ ] swallowing issues  [ ] other:     PO intake:  < 50% [ ]   50-75%  [ ]   %  [X]  other :    Source for PO intake [ ] Patient [ ] family [X] chart [ ] staff [ ] other    Current Weight:   (12/8)  240lbs  (12/2)  215lbs    % Weight Change: 25lb weight gain noted; Question accuracy of weights. Pt states UBW PTA is 220lbs, though Pt appears heavier.     Pertinent Medications: MEDICATIONS  (STANDING):  aspirin  chewable 81 milliGRAM(s) Oral daily  atorvastatin 40 milliGRAM(s) Oral at bedtime  carvedilol 25 milliGRAM(s) Oral every 12 hours  clopidogrel Tablet 75 milliGRAM(s) Oral daily  dextrose 5%. 1000 milliLiter(s) (50 mL/Hr) IV Continuous <Continuous>  dextrose 50% Injectable 12.5 Gram(s) IV Push once  dextrose 50% Injectable 25 Gram(s) IV Push once  dextrose 50% Injectable 25 Gram(s) IV Push once  docusate sodium 100 milliGRAM(s) Oral two times a day  DULoxetine 60 milliGRAM(s) Oral daily  epoetin sara Injectable 20903 Unit(s) SubCutaneous <User Schedule>  furosemide   Injectable 40 milliGRAM(s) IV Push two times a day  gabapentin 100 milliGRAM(s) Oral three times a day  insulin glargine Injectable (LANTUS) 7 Unit(s) SubCutaneous at bedtime  insulin lispro (HumaLOG) corrective regimen sliding scale   SubCutaneous three times a day before meals  iron sucrose IVPB 250 milliGRAM(s) IV Intermittent <User Schedule>  isosorbide   mononitrate ER Tablet (IMDUR) 60 milliGRAM(s) Oral daily  pantoprazole    Tablet 40 milliGRAM(s) Oral before breakfast    MEDICATIONS  (PRN):  acetaminophen   Tablet .. 650 milliGRAM(s) Oral every 6 hours PRN Temp greater or equal to 38C (100.4F), Mild Pain (1 - 3)  bisacodyl Suppository 10 milliGRAM(s) Rectal daily PRN Constipation  dextrose 40% Gel 15 Gram(s) Oral once PRN Blood Glucose LESS THAN 70 milliGRAM(s)/deciliter  glucagon  Injectable 1 milliGRAM(s) IntraMuscular once PRN Glucose LESS THAN 70 milligrams/deciliter  lactulose Syrup 10 Gram(s) Oral three times a day PRN colnstipaiton    Pertinent Labs: CBC Full  -  ( 09 Dec 2018 09:05 )  WBC Count : 5.4 K/uL  Hemoglobin : 8.5 g/dL  Hematocrit : 24.7 %  Platelet Count - Automated : 142 K/uL  Mean Cell Volume : 79.7 fl  Mean Cell Hemoglobin : 27.4 pg  Mean Cell Hemoglobin Concentration : 34.4 g/dL  12-10 Na136 mmol/L Glu 189 mg/dL<H> K+ 5.0 mmol/L Cr  1.63 mg/dL<H> BUN 77.0 mg/dL<H> Phos 3.9 mg/dL Alb n/a   PAB n/a       Skin: R. heel unstageable; surgical incision to back    Nutrition focused physical exam conducted - found signs of malnutrition [X]absent [ ]present    Subcutaneous fat loss: [ ] Orbital fat pads region, [ ]Buccal fat region, [ ]Triceps region,  [ ]Ribs region    Muscle wasting: [ ]Temples region, [ ]Clavicle region, [ ]Shoulder region, [ ]Scapula region, [ ]Interosseous region,  [ ]thigh region, [ ]Calf region    Estimated Needs:   [X] no change since previous assessment  [ ] recalculated:     Current Nutrition Diagnosis: Pt remains at nutrition risk secondary to Increased nutrient needs related to heart failure, + BIPAP as evidenced by increased estimated nutrient needs to maintain optimal nutritional status. Pt now with R. heel unstageable wound. Pt reports good appetite at this time, declines nutrition education for weight management at this time. RD to remain available.     Recommendations:   Consider MVI daily to facilitate wound healing     Monitoring and Evaluation:   [X] PO intake [X] Tolerance to diet prescription [X] Weights  [X] Follow up per protocol [X] Labs: Source: Patient [X]  Family [ ]   other [ ]    Current Diet:   Diet, Consistent Carbohydrate/No Snacks:   DASH/TLC {Sodium & Cholesteral Restricted} (11-27-18 @ 11:35)    Patient reports [ ] nausea  [ ] vomiting [ ] diarrhea [ ] constipation  [ ]chewing problems [ ] swallowing issues  [ ] other:     PO intake:  < 50% [ ]   50-75%  [ ]   %  [X]  other :    Source for PO intake [ ] Patient [ ] family [X] chart [ ] staff [ ] other    Current Weight:   (12/8)  240lbs  (12/2)  215lbs    % Weight Change: 25lb weight gain noted; Question accuracy of weights. Pt states UBW PTA is 220lbs, though Pt appears heavier.     Pertinent Medications: MEDICATIONS  (STANDING):  aspirin  chewable 81 milliGRAM(s) Oral daily  atorvastatin 40 milliGRAM(s) Oral at bedtime  carvedilol 25 milliGRAM(s) Oral every 12 hours  clopidogrel Tablet 75 milliGRAM(s) Oral daily  dextrose 5%. 1000 milliLiter(s) (50 mL/Hr) IV Continuous <Continuous>  dextrose 50% Injectable 12.5 Gram(s) IV Push once  dextrose 50% Injectable 25 Gram(s) IV Push once  dextrose 50% Injectable 25 Gram(s) IV Push once  docusate sodium 100 milliGRAM(s) Oral two times a day  DULoxetine 60 milliGRAM(s) Oral daily  epoetin sara Injectable 77460 Unit(s) SubCutaneous <User Schedule>  furosemide   Injectable 40 milliGRAM(s) IV Push two times a day  gabapentin 100 milliGRAM(s) Oral three times a day  insulin glargine Injectable (LANTUS) 7 Unit(s) SubCutaneous at bedtime  insulin lispro (HumaLOG) corrective regimen sliding scale   SubCutaneous three times a day before meals  iron sucrose IVPB 250 milliGRAM(s) IV Intermittent <User Schedule>  isosorbide   mononitrate ER Tablet (IMDUR) 60 milliGRAM(s) Oral daily  pantoprazole    Tablet 40 milliGRAM(s) Oral before breakfast    MEDICATIONS  (PRN):  acetaminophen   Tablet .. 650 milliGRAM(s) Oral every 6 hours PRN Temp greater or equal to 38C (100.4F), Mild Pain (1 - 3)  bisacodyl Suppository 10 milliGRAM(s) Rectal daily PRN Constipation  dextrose 40% Gel 15 Gram(s) Oral once PRN Blood Glucose LESS THAN 70 milliGRAM(s)/deciliter  glucagon  Injectable 1 milliGRAM(s) IntraMuscular once PRN Glucose LESS THAN 70 milligrams/deciliter  lactulose Syrup 10 Gram(s) Oral three times a day PRN colnstipaiton    Pertinent Labs: CBC Full  -  ( 09 Dec 2018 09:05 )  WBC Count : 5.4 K/uL  Hemoglobin : 8.5 g/dL  Hematocrit : 24.7 %  Platelet Count - Automated : 142 K/uL  Mean Cell Volume : 79.7 fl  Mean Cell Hemoglobin : 27.4 pg  Mean Cell Hemoglobin Concentration : 34.4 g/dL  12-10 Na136 mmol/L Glu 189 mg/dL<H> K+ 5.0 mmol/L Cr  1.63 mg/dL<H> BUN 77.0 mg/dL<H> Phos 3.9 mg/dL Alb n/a   PAB n/a       Skin: R. heel unstageable; surgical incision to back    Nutrition focused physical exam conducted - found signs of malnutrition [X]absent [ ]present    Subcutaneous fat loss: [ ] Orbital fat pads region, [ ]Buccal fat region, [ ]Triceps region,  [ ]Ribs region    Muscle wasting: [ ]Temples region, [ ]Clavicle region, [ ]Shoulder region, [ ]Scapula region, [ ]Interosseous region,  [ ]thigh region, [ ]Calf region    Estimated Needs:   [X] no change since previous assessment  [ ] recalculated:     Current Nutrition Diagnosis: Pt remains at nutrition risk secondary to Increased nutrient needs related to heart failure, + BIPAP as evidenced by increased estimated nutrient needs to maintain optimal nutritional status. Pt now with R. heel unstageable wound. Pt reports good appetite at this time, declines nutrition education for weight management at this time. RD to remain available.     Recommendations:   Consider MVI daily to facilitate wound healing   Monitor daily weights    Monitoring and Evaluation:   [X] PO intake [X] Tolerance to diet prescription [X] Weights  [X] Follow up per protocol [X] Labs:

## 2018-12-10 NOTE — PROGRESS NOTE ADULT - SUBJECTIVE AND OBJECTIVE BOX
INTERVAL HPI/OVERNIGHT EVENTS:    CC: acute on chronic hypercapnic respiratory failure, diastolic heart failure, KEITH improving, diabetes mellitus      No overnight events, denies complaints, sitting in chair, improving shortness of breath    Vital Signs Last 24 Hrs  T(C): 36.8 (10 Dec 2018 08:29), Max: 37 (09 Dec 2018 23:35)  T(F): 98.2 (10 Dec 2018 08:29), Max: 98.6 (09 Dec 2018 23:35)  HR: 64 (10 Dec 2018 08:) (59 - 64)  BP: 118/71 (10 Dec 2018 08:29) (115/78 - 129/69)  BP(mean): --  RR: 18 (10 Dec 2018 08:) (18 - 18)  SpO2: 96% (10 Dec 2018 08:) (95% - 98%)    PHYSICAL EXAM:    GENERAL: Not in distress, alert, sitting in chair  CHEST/LUNG: b/l air entry, decreased in bases  HEART: Regular  ABDOMEN: Soft, BS+  EXTREMITIES:  No edema, tenderness.     MEDICATIONS  (STANDING):  aspirin  chewable 81 milliGRAM(s) Oral daily  atorvastatin 40 milliGRAM(s) Oral at bedtime  carvedilol 25 milliGRAM(s) Oral every 12 hours  clopidogrel Tablet 75 milliGRAM(s) Oral daily  dextrose 5%. 1000 milliLiter(s) (50 mL/Hr) IV Continuous <Continuous>  dextrose 50% Injectable 12.5 Gram(s) IV Push once  dextrose 50% Injectable 25 Gram(s) IV Push once  dextrose 50% Injectable 25 Gram(s) IV Push once  docusate sodium 100 milliGRAM(s) Oral two times a day  DULoxetine 60 milliGRAM(s) Oral daily  epoetin sara Injectable 94422 Unit(s) SubCutaneous <User Schedule>  furosemide   Injectable 40 milliGRAM(s) IV Push two times a day  gabapentin 100 milliGRAM(s) Oral three times a day  insulin glargine Injectable (LANTUS) 7 Unit(s) SubCutaneous at bedtime  insulin lispro (HumaLOG) corrective regimen sliding scale   SubCutaneous three times a day before meals  iron sucrose IVPB 250 milliGRAM(s) IV Intermittent <User Schedule>  isosorbide   mononitrate ER Tablet (IMDUR) 60 milliGRAM(s) Oral daily  pantoprazole    Tablet 40 milliGRAM(s) Oral before breakfast    MEDICATIONS  (PRN):  acetaminophen   Tablet .. 650 milliGRAM(s) Oral every 6 hours PRN Temp greater or equal to 38C (100.4F), Mild Pain (1 - 3)  bisacodyl Suppository 10 milliGRAM(s) Rectal daily PRN Constipation  dextrose 40% Gel 15 Gram(s) Oral once PRN Blood Glucose LESS THAN 70 milliGRAM(s)/deciliter  glucagon  Injectable 1 milliGRAM(s) IntraMuscular once PRN Glucose LESS THAN 70 milligrams/deciliter  lactulose Syrup 10 Gram(s) Oral three times a day PRN colnstipaiton      Allergies    Accupril (Other)    Intolerances          LABS:                          8.5    5.4   )-----------( 142      ( 09 Dec 2018 09:05 )             24.7     12-10    136  |  95<L>  |  77.0<H>  ----------------------------<  189<H>  5.0   |  29.0  |  1.63<H>    Ca    8.5<L>      10 Dec 2018 09:32  Phos  3.9     12-10  Mg     2.3     12-10        Urinalysis Basic - ( 08 Dec 2018 15:02 )    Color: Yellow / Appearance: Clear / S.015 / pH: x  Gluc: x / Ketone: Negative  / Bili: Negative / Urobili: 4 mg/dL   Blood: x / Protein: 30 mg/dL / Nitrite: Negative   Leuk Esterase: Moderate / RBC: 0-2 /HPF / WBC 11-25   Sq Epi: x / Non Sq Epi: Few / Bacteria: Occasional        RADIOLOGY & ADDITIONAL TESTS:

## 2018-12-10 NOTE — PROGRESS NOTE ADULT - ASSESSMENT
64 y/o female with CAD s/p CABG and PCI, PAD s/p balloon angioplasty, diabetes mellitus, hypertension, hyperlipidemia admitted with complaints of worsening exertional shortness of breath. She was evaluated by cardiology and started on IV diuretics, for acute on chronic diastolic CHF. Stress test was done, showed inferior ischemia, medical management advised. She was noted to have CO2 narcosis, placed on IV. Cardiology planned for right heart catheterisation given RV dysfunction. Dobutamine was given for RV dysfunction. She was noted to have contrast induced nephropathy, diuretics were held. Nephrology consulted, Losartan was held. IR was consulted for pleural effusion, underwent right thoracentesis. RRT was called, for lethargy, related to hypercapnia, improved with NIV. Diuretics were reinstated given worsening symptoms. Her renal function improved. Venofer was added for anemia. PT consulted.

## 2018-12-10 NOTE — PROGRESS NOTE ADULT - SUBJECTIVE AND OBJECTIVE BOX
[facility]                                                        Office: [address] [address]                                                       Telephone: [phone]. Fax: [phone]      CC: Shortness of breath    INTERVAL HISTORY: Stable shortness of breath.     MEDICATIONS  (STANDING):  aspirin  chewable 81 milliGRAM(s) Oral daily  atorvastatin 80 milliGRAM(s) Oral at bedtime  carvedilol 25 milliGRAM(s) Oral every 12 hours  clopidogrel Tablet 75 milliGRAM(s) Oral daily  dextrose 5%. 1000 milliLiter(s) (50 mL/Hr) IV Continuous <Continuous>  dextrose 50% Injectable 12.5 Gram(s) IV Push once  dextrose 50% Injectable 25 Gram(s) IV Push once  dextrose 50% Injectable 25 Gram(s) IV Push once  docusate sodium 100 milliGRAM(s) Oral two times a day  DULoxetine 60 milliGRAM(s) Oral daily  epoetin sara Injectable 21754 Unit(s) SubCutaneous <User Schedule>  furosemide   Injectable 40 milliGRAM(s) IV Push two times a day  gabapentin 100 milliGRAM(s) Oral three times a day  insulin glargine Injectable (LANTUS) 7 Unit(s) SubCutaneous at bedtime  insulin lispro (HumaLOG) corrective regimen sliding scale   SubCutaneous three times a day before meals  iron sucrose IVPB 250 milliGRAM(s) IV Intermittent <User Schedule>  isosorbide   mononitrate ER Tablet (IMDUR) 60 milliGRAM(s) Oral daily  nitroglycerin    Patch 0.4 mG/Hr(s) 1 patch Transdermal daily  pantoprazole    Tablet 40 milliGRAM(s) Oral before breakfast    ROS: All others negative     PHYSICAL EXAM:  T(C): 36.8 (12-10-18 @ 08:29), Max: 37 (12-09-18 @ 23:35)  HR: 64 (12-10-18 @ 08:29) (59 - 64)  BP: 118/71 (12-10-18 @ 08:29) (115/78 - 129/69)  RR: 18 (12-10-18 @ 08:29) (18 - 18)  SpO2: 96% (12-10-18 @ 08:29) (95% - 98%)  Wt(kg): --  I&O's Summary    09 Dec 2018 07:01  -  10 Dec 2018 07:00  --------------------------------------------------------  IN: 100 mL / OUT: 0 mL / NET: 100 mL      Appearance: Normal	  HEENT:   limited vision.   Lymphatic: No lymphadenopathy  Cardiovascular: Normal S1 S2, No JVD, No murmurs, No edema  Respiratory: Lungs clear to auscultation	  Psychiatry: somnolent  Gastrointestinal:  Soft, Non-tender, + BS	  Skin: No rashes, No ecchymoses, No cyanosis  Neurologic: Non-focal  Extremities: Normal range of motion, No clubbing, cyanosis or edema  Vascular: Peripheral pulses palpable 2+ bilaterally    TELEMETRY: 	      LABS:	 	                        8.5    5.4   )-----------( 142      ( 09 Dec 2018 09:05 )             24.7     12-09    136  |  94<L>  |  77.0<H>  ----------------------------<  98  4.7   |  32.0<H>  |  1.69<H>    Ca    8.4<L>      09 Dec 2018 09:05  Mg     2.2     12-09

## 2018-12-10 NOTE — PROGRESS NOTE ADULT - SUBJECTIVE AND OBJECTIVE BOX
PULMONARY PROGRESS NOTE      RANJIT ROCHA-[id number]    Patient is a 63y old  Female who presents with a chief complaint of Shortness of breath (10 Dec 2018 12:53)  his is 64 y/o female with hx of CAD, s/p CABG x1 (lima->LAD 2015), MADHAVI x2-> LCX & OM1 (2015), PAD s/p balloon angioplasty, DM2, HTN, HLD who presents with worsening dyspnea and increasing abdominal girth over the past 2 weeks. Pt is mostly bedridden, with limited mobility since R foot heel & 3 toes amputation last December. Pt had a prolonged hospital stay last year ([place]), followed by several months in rehab.   She denies chest pain, palpitations or syncope, uses several pillows to sleep. Pt states she's been compliant with her medications and adheres to low-salt diet. She follows up with Dr. Recio and was referred for TTE and stress test on the last visit but hasn't completed these tests yet.   In the Er pt was found to have pro-BNP 3460 and received Lasix 40 mg IVP. Troponin was negative. EKG shows SR with inverted T-waves in inferior leads.      INTERVAL HPI/OVERNIGHT EVENTS:    MEDICATIONS  (STANDING):  aspirin  chewable 81 milliGRAM(s) Oral daily  atorvastatin 40 milliGRAM(s) Oral at bedtime  carvedilol 25 milliGRAM(s) Oral every 12 hours  clopidogrel Tablet 75 milliGRAM(s) Oral daily  dextrose 5%. 1000 milliLiter(s) (50 mL/Hr) IV Continuous <Continuous>  dextrose 50% Injectable 12.5 Gram(s) IV Push once  dextrose 50% Injectable 25 Gram(s) IV Push once  dextrose 50% Injectable 25 Gram(s) IV Push once  docusate sodium 100 milliGRAM(s) Oral two times a day  DULoxetine 60 milliGRAM(s) Oral daily  epoetin sara Injectable 37875 Unit(s) SubCutaneous <User Schedule>  furosemide   Injectable 40 milliGRAM(s) IV Push two times a day  gabapentin 100 milliGRAM(s) Oral three times a day  insulin glargine Injectable (LANTUS) 7 Unit(s) SubCutaneous at bedtime  insulin lispro (HumaLOG) corrective regimen sliding scale   SubCutaneous three times a day before meals  iron sucrose IVPB 250 milliGRAM(s) IV Intermittent <User Schedule>  isosorbide   mononitrate ER Tablet (IMDUR) 60 milliGRAM(s) Oral daily  pantoprazole    Tablet 40 milliGRAM(s) Oral before breakfast      MEDICATIONS  (PRN):  acetaminophen   Tablet .. 650 milliGRAM(s) Oral every 6 hours PRN Temp greater or equal to 38C (100.4F), Mild Pain (1 - 3)  bisacodyl Suppository 10 milliGRAM(s) Rectal daily PRN Constipation  dextrose 40% Gel 15 Gram(s) Oral once PRN Blood Glucose LESS THAN 70 milliGRAM(s)/deciliter  glucagon  Injectable 1 milliGRAM(s) IntraMuscular once PRN Glucose LESS THAN 70 milligrams/deciliter  lactulose Syrup 10 Gram(s) Oral three times a day PRN colnstipaiton      Allergies    Accupril (Other)    Intolerances        PAST MEDICAL & SURGICAL HISTORY:  Herniated disc, cervical  PAD (peripheral artery disease)  Legally blind  History of peripheral vascular disease  History of retinal detachment  History of CEA (carotid endarterectomy): Right  Hyperlipidemia  Glaucoma  Hypertension  Diabetes  S/P CABG x 1  After cataract  Uveitic glaucoma of both eyes  Detached retina  Atherosclerosis of right carotid artery   delivery delivered      SOCIAL HISTORY  Smoking History:       REVIEW OF SYSTEMS:    CONSTITUTIONAL:  No distress    HEENT:  Eyes:  No diplopia or blurred vision. ENT:  No earache, sore throat or runny nose.    CARDIOVASCULAR:  No pressure, squeezing, tightness, heaviness or aching about the chest; no palpitations.    RESPIRATORY:  No cough, shortness of breath, PND or orthopnea. Mild SOBOE    GASTROINTESTINAL:  No nausea, vomiting or diarrhea.    GENITOURINARY:  No dysuria, frequency or urgency.    NEUROLOGIC:  No paresthesias, fasciculations, seizures or weakness.    PSYCHIATRIC:  No disorder of thought or mood.    Vital Signs Last 24 Hrs  T(C): 36.3 (10 Dec 2018 15:48), Max: 37 (09 Dec 2018 23:35)  T(F): 97.4 (10 Dec 2018 15:48), Max: 98.6 (09 Dec 2018 23:35)  HR: 66 (10 Dec 2018 15:48) (59 - 66)  BP: 144/75 (10 Dec 2018 15:48) (115/78 - 144/75)  BP(mean): --  RR: 18 (10 Dec 2018 15:48) (18 - 18)  SpO2: 95% (10 Dec 2018 15:48) (95% - 98%)    PHYSICAL EXAMINATION:    GENERAL: The patient is awake and alert in no apparent distress.     HEENT: Head is normocephalic and atraumatic. Extraocular muscles are intact. Mucous membranes are moist.    NECK: Supple.    LUNGS: Clear to auscultation without wheezing, rales or rhonchi; respirations unlabored    HEART: Regular rate and rhythm without murmur.    ABDOMEN: Soft, nontender, and nondistended.      EXTREMITIES: Without any cyanosis, clubbing, rash, lesions or edema.    NEUROLOGIC: Grossly intact.    LABS:                        8.5    5.4   )-----------( 142      ( 09 Dec 2018 09:05 )             24.7     12-10    136  |  95<L>  |  77.0<H>  ----------------------------<  189<H>  5.0   |  29.0  |  1.63<H>    Ca    8.5<L>      10 Dec 2018 09:32  Phos  3.9     12-10  Mg     2.3     12-10          ABG - ( 10 Dec 2018 12:26 )  pH, Arterial: 7.36  pH, Blood: x     /  pCO2: 63    /  pO2: 71    / HCO3: 32    / Base Excess: 8.7   /  SaO2: 94                              MICROBIOLOGY:    RADIOLOGY & ADDITIONAL STUDIES:\  cxr and CT scan reviewed PULMONARY PROGRESS NOTE      RANJIT ROCHA-[id number]    Patient is a 63y old  Female who presents with a chief complaint of Shortness of breath (10 Dec 2018 12:53)  his is 64 y/o female with hx of CAD, s/p CABG x1 ([place]->LAD 2015), MADHAVI x2-> LCX & OM1 (2015), PAD s/p balloon angioplasty, DM2, HTN, HLD who presents with worsening dyspnea and increasing abdominal girth over the past 2 weeks. Pt is mostly bedridden, with limited mobility since R foot heel & 3 toes amputation last December. Pt had a prolonged hospital stay last year ([place]), followed by several months in rehab.   She denies chest pain, palpitations or syncope, uses several pillows to sleep. Pt states she's been compliant with her medications and adheres to low-salt diet. She follows up with Dr. Recio and was referred for TTE and stress test on the last visit but hasn't completed these tests yet.   In the Er pt was found to have pro-BNP 3460 and received Lasix 40 mg IVP. Troponin was negative. EKG shows SR with inverted T-waves in inferior leads.      INTERVAL HPI/OVERNIGHT EVENTS:  feels better from admission  MEDICATIONS  (STANDING):  aspirin  chewable 81 milliGRAM(s) Oral daily  atorvastatin 40 milliGRAM(s) Oral at bedtime  carvedilol 25 milliGRAM(s) Oral every 12 hours  clopidogrel Tablet 75 milliGRAM(s) Oral daily  dextrose 5%. 1000 milliLiter(s) (50 mL/Hr) IV Continuous <Continuous>  dextrose 50% Injectable 12.5 Gram(s) IV Push once  dextrose 50% Injectable 25 Gram(s) IV Push once  dextrose 50% Injectable 25 Gram(s) IV Push once  docusate sodium 100 milliGRAM(s) Oral two times a day  DULoxetine 60 milliGRAM(s) Oral daily  epoetin sara Injectable 20085 Unit(s) SubCutaneous <User Schedule>  furosemide   Injectable 40 milliGRAM(s) IV Push two times a day  gabapentin 100 milliGRAM(s) Oral three times a day  insulin glargine Injectable (LANTUS) 7 Unit(s) SubCutaneous at bedtime  insulin lispro (HumaLOG) corrective regimen sliding scale   SubCutaneous three times a day before meals  iron sucrose IVPB 250 milliGRAM(s) IV Intermittent <User Schedule>  isosorbide   mononitrate ER Tablet (IMDUR) 60 milliGRAM(s) Oral daily  pantoprazole    Tablet 40 milliGRAM(s) Oral before breakfast      MEDICATIONS  (PRN):  acetaminophen   Tablet .. 650 milliGRAM(s) Oral every 6 hours PRN Temp greater or equal to 38C (100.4F), Mild Pain (1 - 3)  bisacodyl Suppository 10 milliGRAM(s) Rectal daily PRN Constipation  dextrose 40% Gel 15 Gram(s) Oral once PRN Blood Glucose LESS THAN 70 milliGRAM(s)/deciliter  glucagon  Injectable 1 milliGRAM(s) IntraMuscular once PRN Glucose LESS THAN 70 milligrams/deciliter  lactulose Syrup 10 Gram(s) Oral three times a day PRN colnstipaiton      Allergies    Accupril (Other)    Intolerances        PAST MEDICAL & SURGICAL HISTORY:  Herniated disc, cervical  PAD (peripheral artery disease)  Legally blind  History of peripheral vascular disease  History of retinal detachment  History of CEA (carotid endarterectomy): Right  Hyperlipidemia  Glaucoma  Hypertension  Diabetes  S/P CABG x 1  After cataract  Uveitic glaucoma of both eyes  Detached retina  Atherosclerosis of right carotid artery   delivery delivered      SOCIAL HISTORY  Smoking History:       REVIEW OF SYSTEMS:    CONSTITUTIONAL:  No distress    HEENT:  Eyes:  No diplopia or blurred vision. ENT:  No earache, sore throat or runny nose.    CARDIOVASCULAR:  No pressure, squeezing, tightness, heaviness or aching about the chest; no palpitations.    RESPIRATORY:  see hpi    GASTROINTESTINAL:  No nausea, vomiting or diarrhea.    GENITOURINARY:  No dysuria, frequency or urgency.    NEUROLOGIC:  No paresthesias, fasciculations, seizures or weakness.    PSYCHIATRIC:  No disorder of thought or mood.    Vital Signs Last 24 Hrs  T(C): 36.3 (10 Dec 2018 15:48), Max: 37 (09 Dec 2018 23:35)  T(F): 97.4 (10 Dec 2018 15:48), Max: 98.6 (09 Dec 2018 23:35)  HR: 66 (10 Dec 2018 15:48) (59 - 66)  BP: 144/75 (10 Dec 2018 15:48) (115/78 - 144/75)  BP(mean): --  RR: 18 (10 Dec 2018 15:48) (18 - 18)  SpO2: 95% (10 Dec 2018 15:48) (95% - 98%)    PHYSICAL EXAMINATION:    GENERAL: The patient is awake and alert in no apparent distress.     HEENT: Head is normocephalic and atraumatic. Extraocular muscles are intact. Mucous membranes are moist.    NECK: Supple.    LUNGS: moderate  air entry bilat  without wheezing, rales or rhonchi; respirations unlabored    HEART: Regular rate and rhythm without murmur.    ABDOMEN: Soft, nontender, and nondistended.      EXTREMITIES: With 1plus  edema.    NEUROLOGIC: Grossly intact.    LABS:                        8.5    5.4   )-----------( 142      ( 09 Dec 2018 09:05 )             24.7     12-10    136  |  95<L>  |  77.0<H>  ----------------------------<  189<H>  5.0   |  29.0  |  1.63<H>    Ca    8.5<L>      10 Dec 2018 09:32  Phos  3.9     12-10  Mg     2.3     12-10          ABG - ( 10 Dec 2018 12:26 )  pH, Arterial: 7.36  pH, Blood: x     /  pCO2: 63    /  pO2: 71    / HCO3: 32    / Base Excess: 8.7   /  SaO2: 94                              MICROBIOLOGY:    RADIOLOGY & ADDITIONAL STUDIES:\  cxr and CT scan reviewed

## 2018-12-10 NOTE — PROGRESS NOTE ADULT - PROBLEM SELECTOR PLAN 1
Cardiology follow up noted, transition to PO diuretics in am, continue statin. Monitor BMP. PT consulted.

## 2018-12-10 NOTE — CDI QUERY NOTE - NSCDIOTHERTXTBX_GEN_ALL_CORE_HH
Clinical documentation indicates that this patient has KEITH/acute kidney failure due to MOLLY.  Pt. had a CT of the Chest and Abdomen/Pelvis with IV contrast on 12/1/18.    Can the patient's renal status be further clarified?    1.) KEITH likely acute tubular necrosis (ATN) from contrast induced nephropathy  2.) other (please specify)  3.) not clinically significant       Supporting Documentation and/or Clinical Evidence:    12/6/18 CARDIOLOGY NOTE:   KEITH/ creat rising despite d/c lasix and ARB  Attending Attestation:   ARF- most likely MOLLY.     12/8 HOSPITALIST NOTE:  Problem/Plan - 2:  Problem: KEITH (acute kidney injury).  Plan: Improving, likely sec to MOLLY.     CATH REPORT:  < from: Cardiac Cath Lab - Adult (12.04.18 @ 15:00) >  Consider MOLLY after CTA imaging as possible cause of renal dysfunction.    < end of copied text >      Creatinine Trend:  Creatinine, Serum: 1.63 mg/dL <H> [0.50 - 1.30] (12-10-18)  Creatinine, Serum: 1.69 mg/dL <H> [0.50 - 1.30] (12-09-18)  Creatinine, Serum: 1.89 mg/dL <H> [0.50 - 1.30] (12-08-18)  Creatinine, Serum: 2.38 mg/dL <H> [0.50 - 1.30] (12-07-18)  Creatinine, Serum: 2.77 mg/dL <H> [0.50 - 1.30] (12-06-18)  Creatinine, Serum: 2.83 mg/dL <H> [0.50 - 1.30] (12-06-18)  Creatinine, Serum: 2.59 mg/dL <H> [0.50 - 1.30] (12-05-18)  Creatinine, Serum: 2.10 mg/dL <H> [0.50 - 1.30] (12-05-18)  Creatinine, Serum: 1.06 mg/dL [0.50 - 1.30] (12-03-18)  Creatinine, Serum: 0.98 mg/dL [0.50 - 1.30] (12-02-18)

## 2018-12-10 NOTE — PROGRESS NOTE ADULT - PROBLEM SELECTOR PLAN 1
On lasix 40 mg IV bid. No daily weights in chart and urine output not adequately monitored.   - daily weights.   will change lasix iV to torsemide 40 mg po bid tomorrow.   -Unclear why patient is on a NTG patch when she is on imdur. will stop patch. if needs better blood pressure control, can add other agents.

## 2018-12-10 NOTE — PROGRESS NOTE ADULT - SUBJECTIVE AND OBJECTIVE BOX
NEPHROLOGY INTERVAL HPI/OVERNIGHT EVENTS:  pt seated in chair  no acute distres  no cp, sob , n/v/d; tolerated breakfast    MEDICATIONS  (STANDING):  aspirin  chewable 81 milliGRAM(s) Oral daily  atorvastatin 40 milliGRAM(s) Oral at bedtime  carvedilol 25 milliGRAM(s) Oral every 12 hours  clopidogrel Tablet 75 milliGRAM(s) Oral daily  dextrose 5%. 1000 milliLiter(s) (50 mL/Hr) IV Continuous <Continuous>  dextrose 50% Injectable 12.5 Gram(s) IV Push once  dextrose 50% Injectable 25 Gram(s) IV Push once  dextrose 50% Injectable 25 Gram(s) IV Push once  docusate sodium 100 milliGRAM(s) Oral two times a day  DULoxetine 60 milliGRAM(s) Oral daily  epoetin sara Injectable 90845 Unit(s) SubCutaneous <User Schedule>  furosemide   Injectable 40 milliGRAM(s) IV Push two times a day  gabapentin 100 milliGRAM(s) Oral three times a day  insulin glargine Injectable (LANTUS) 7 Unit(s) SubCutaneous at bedtime  insulin lispro (HumaLOG) corrective regimen sliding scale   SubCutaneous three times a day before meals  iron sucrose IVPB 250 milliGRAM(s) IV Intermittent <User Schedule>  isosorbide   mononitrate ER Tablet (IMDUR) 60 milliGRAM(s) Oral daily  pantoprazole    Tablet 40 milliGRAM(s) Oral before breakfast    MEDICATIONS  (PRN):  acetaminophen   Tablet .. 650 milliGRAM(s) Oral every 6 hours PRN Temp greater or equal to 38C (100.4F), Mild Pain (1 - 3)  bisacodyl Suppository 10 milliGRAM(s) Rectal daily PRN Constipation  dextrose 40% Gel 15 Gram(s) Oral once PRN Blood Glucose LESS THAN 70 milliGRAM(s)/deciliter  glucagon  Injectable 1 milliGRAM(s) IntraMuscular once PRN Glucose LESS THAN 70 milligrams/deciliter  lactulose Syrup 10 Gram(s) Oral three times a day PRN colnstipaiton      Allergies    Accupril (Other)          Vital Signs Last 24 Hrs  T(C): 36.8 (10 Dec 2018 08:29), Max: 37 (09 Dec 2018 23:35)  T(F): 98.2 (10 Dec 2018 08:29), Max: 98.6 (09 Dec 2018 23:35)  HR: 64 (10 Dec 2018 08:29) (59 - 64)  BP: 118/71 (10 Dec 2018 08:29) (115/78 - 129/69)  BP(mean): --  RR: 18 (10 Dec 2018 08:29) (18 - 18)  SpO2: 96% (10 Dec 2018 08:29) (95% - 98%)    PHYSICAL EXAM:  GENERAL: Obese  HEENT: opacified R eye  NECK: Supple, +JVD  NERVOUS SYSTEM:  Alert & Oriented X3  CHEST/LUNG: Diminished bilateral BS  HEART: Regular rate and rhythm; no rub  ABDOMEN: Soft, Nontender,+ distention; +BS; + flank edema  EXTREMITIES:  + LE edema    LABS:                        8.5    5.4   )-----------( 142      ( 09 Dec 2018 09:05 )             24.7     12-10    136  |  95<L>  |  77.0<H>  ----------------------------<  189<H>  5.0   |  29.0  |  1.63<H>    Creatinine, Serum: 1.69 mg/dL (18 @ 09:05)        Ca    8.5<L>      10 Dec 2018 09:32  Phos  3.9     1210  Mg     2.3     12-10        Urinalysis Basic - ( 08 Dec 2018 15:02 )    Color: Yellow / Appearance: Clear / S.015 / pH: x  Gluc: x / Ketone: Negative  / Bili: Negative / Urobili: 4 mg/dL   Blood: x / Protein: 30 mg/dL / Nitrite: Negative   Leuk Esterase: Moderate / RBC: 0-2 /HPF / WBC 11-25   Sq Epi: x / Non Sq Epi: Few / Bacteria: Occasional      Phosphorus Level, Serum: 3.9 mg/dL (12-10 @ 09:32)  Magnesium, Serum: 2.3 mg/dL (12-10 @ 09:32)      RADIOLOGY & ADDITIONAL TESTS:

## 2018-12-10 NOTE — PROGRESS NOTE ADULT - PROBLEM SELECTOR PLAN 2
Stable renal function.   End point for this patient is going to be challenging considering very poor physical status. Once volume status is stable, will have to consider dc planning to rehab.

## 2018-12-10 NOTE — PROGRESS NOTE ADULT - ASSESSMENT
ARF: likely renal hypoperfusion due to diuretics  CHF (EF= 70%; mild pulm HTN)==> diastolic dysfunction  Renal sono repeated and unremarkable  CHF now compensated  - continue diuretics to keep azotemic  - daily weights    Anemia: % Saturation, Iron: 10 % (12.08.18 @ 09:38); Ferritin, Serum: 116 ng/mL (12.08.18 @ 09:38)  - cont QUENTIN  - added Venofer  - trend labs; target Hgb=10.0

## 2018-12-10 NOTE — PROGRESS NOTE ADULT - ASSESSMENT
chronic hypercapnic vent failure  OHS/KYREE  CHFpEF, CAD, KEITH  needs nocturnal bipap  repeat CXR  mobilize  02 chronic hypercapnic vent failure, CHFpEF, CAD, KEITH  restrictive physiologic impairment OHS/KYREE  needs nocturnal bipap given persistent hypercapnia to decrease re admissions  and improve quality of life  repeat CXR  mobilize  02 chronic hypercapnic vent failure, CHFpEF, CAD, KEITH  restrictive physiologic impairment OHS/KYREE  despite  nocturnal bipap persistent hypercapnia   Needs AVAPS / trilogy to decrease hypercaapnia,  re admissions  and improve quality of life  repeat CXR  mobilize  02

## 2018-12-11 LAB
ANION GAP SERPL CALC-SCNC: 8 MMOL/L — SIGNIFICANT CHANGE UP (ref 5–17)
BUN SERPL-MCNC: 80 MG/DL — HIGH (ref 8–20)
CALCIUM SERPL-MCNC: 8.5 MG/DL — LOW (ref 8.6–10.2)
CHLORIDE SERPL-SCNC: 96 MMOL/L — LOW (ref 98–107)
CO2 SERPL-SCNC: 32 MMOL/L — HIGH (ref 22–29)
CREAT SERPL-MCNC: 1.68 MG/DL — HIGH (ref 0.5–1.3)
GLUCOSE BLDC GLUCOMTR-MCNC: 151 MG/DL — HIGH (ref 70–99)
GLUCOSE BLDC GLUCOMTR-MCNC: 151 MG/DL — HIGH (ref 70–99)
GLUCOSE BLDC GLUCOMTR-MCNC: 172 MG/DL — HIGH (ref 70–99)
GLUCOSE BLDC GLUCOMTR-MCNC: 174 MG/DL — HIGH (ref 70–99)
GLUCOSE SERPL-MCNC: 153 MG/DL — HIGH (ref 70–115)
NT-PROBNP SERPL-SCNC: 5901 PG/ML — HIGH (ref 0–300)
POTASSIUM SERPL-MCNC: 5.2 MMOL/L — SIGNIFICANT CHANGE UP (ref 3.5–5.3)
POTASSIUM SERPL-SCNC: 5.2 MMOL/L — SIGNIFICANT CHANGE UP (ref 3.5–5.3)
SODIUM SERPL-SCNC: 136 MMOL/L — SIGNIFICANT CHANGE UP (ref 135–145)

## 2018-12-11 PROCEDURE — 99232 SBSQ HOSP IP/OBS MODERATE 35: CPT

## 2018-12-11 PROCEDURE — 71046 X-RAY EXAM CHEST 2 VIEWS: CPT | Mod: 26

## 2018-12-11 RX ADMIN — DULOXETINE HYDROCHLORIDE 60 MILLIGRAM(S): 30 CAPSULE, DELAYED RELEASE ORAL at 11:39

## 2018-12-11 RX ADMIN — CARVEDILOL PHOSPHATE 25 MILLIGRAM(S): 80 CAPSULE, EXTENDED RELEASE ORAL at 06:30

## 2018-12-11 RX ADMIN — GABAPENTIN 100 MILLIGRAM(S): 400 CAPSULE ORAL at 06:30

## 2018-12-11 RX ADMIN — Medication 40 MILLIGRAM(S): at 06:29

## 2018-12-11 RX ADMIN — INSULIN GLARGINE 7 UNIT(S): 100 INJECTION, SOLUTION SUBCUTANEOUS at 22:26

## 2018-12-11 RX ADMIN — Medication 81 MILLIGRAM(S): at 11:39

## 2018-12-11 RX ADMIN — GABAPENTIN 100 MILLIGRAM(S): 400 CAPSULE ORAL at 22:26

## 2018-12-11 RX ADMIN — ATORVASTATIN CALCIUM 40 MILLIGRAM(S): 80 TABLET, FILM COATED ORAL at 22:26

## 2018-12-11 RX ADMIN — GABAPENTIN 100 MILLIGRAM(S): 400 CAPSULE ORAL at 14:59

## 2018-12-11 RX ADMIN — Medication 100 MILLIGRAM(S): at 18:07

## 2018-12-11 RX ADMIN — PANTOPRAZOLE SODIUM 40 MILLIGRAM(S): 20 TABLET, DELAYED RELEASE ORAL at 06:30

## 2018-12-11 RX ADMIN — CLOPIDOGREL BISULFATE 75 MILLIGRAM(S): 75 TABLET, FILM COATED ORAL at 11:39

## 2018-12-11 RX ADMIN — CARVEDILOL PHOSPHATE 25 MILLIGRAM(S): 80 CAPSULE, EXTENDED RELEASE ORAL at 18:07

## 2018-12-11 RX ADMIN — IRON SUCROSE 112.5 MILLIGRAM(S): 20 INJECTION, SOLUTION INTRAVENOUS at 11:42

## 2018-12-11 RX ADMIN — Medication 1: at 07:57

## 2018-12-11 RX ADMIN — Medication 1: at 11:39

## 2018-12-11 RX ADMIN — Medication 100 MILLIGRAM(S): at 06:30

## 2018-12-11 RX ADMIN — Medication 1: at 16:43

## 2018-12-11 RX ADMIN — ISOSORBIDE MONONITRATE 60 MILLIGRAM(S): 60 TABLET, EXTENDED RELEASE ORAL at 11:39

## 2018-12-11 RX ADMIN — Medication 40 MILLIGRAM(S): at 18:07

## 2018-12-11 NOTE — PROGRESS NOTE ADULT - SUBJECTIVE AND OBJECTIVE BOX
INTERVAL HPI/OVERNIGHT EVENTS:    CC: acute on chronic hypercapnic respiratory failure improving, diastolic heart failure, KEITH improving, diabetes mellitus      No overnight events, no short of breath. has cough with white sputum on occasion. No fever.    Vital Signs Last 24 Hrs  T(C): 36.4 (10 Dec 2018 23:50), Max: 36.4 (10 Dec 2018 23:50)  T(F): 97.6 (10 Dec 2018 23:50), Max: 97.6 (10 Dec 2018 23:50)  HR: 67 (11 Dec 2018 11:51) (65 - 67)  BP: 126/69 (10 Dec 2018 23:50) (126/69 - 144/75)  BP(mean): --  RR: 18 (10 Dec 2018 23:50) (18 - 18)  SpO2: 94% (11 Dec 2018 11:51) (94% - 98%)    PHYSICAL EXAM:    GENERAL: morbidly obese, not in distress, alert  CHEST/LUNG: b/l air entry, decreased in bases  HEART: Regular   ABDOMEN: Soft, BS+  EXTREMITIES: No edema, tenderness.    MEDICATIONS  (STANDING):  aspirin  chewable 81 milliGRAM(s) Oral daily  atorvastatin 40 milliGRAM(s) Oral at bedtime  carvedilol 25 milliGRAM(s) Oral every 12 hours  clopidogrel Tablet 75 milliGRAM(s) Oral daily  dextrose 5%. 1000 milliLiter(s) (50 mL/Hr) IV Continuous <Continuous>  dextrose 50% Injectable 12.5 Gram(s) IV Push once  dextrose 50% Injectable 25 Gram(s) IV Push once  dextrose 50% Injectable 25 Gram(s) IV Push once  docusate sodium 100 milliGRAM(s) Oral two times a day  DULoxetine 60 milliGRAM(s) Oral daily  epoetin sara Injectable 89561 Unit(s) SubCutaneous <User Schedule>  gabapentin 100 milliGRAM(s) Oral three times a day  insulin glargine Injectable (LANTUS) 7 Unit(s) SubCutaneous at bedtime  insulin lispro (HumaLOG) corrective regimen sliding scale   SubCutaneous three times a day before meals  iron sucrose IVPB 250 milliGRAM(s) IV Intermittent <User Schedule>  isosorbide   mononitrate ER Tablet (IMDUR) 60 milliGRAM(s) Oral daily  pantoprazole    Tablet 40 milliGRAM(s) Oral before breakfast  torsemide 40 milliGRAM(s) Oral two times a day    MEDICATIONS  (PRN):  acetaminophen   Tablet .. 650 milliGRAM(s) Oral every 6 hours PRN Temp greater or equal to 38C (100.4F), Mild Pain (1 - 3)  bisacodyl Suppository 10 milliGRAM(s) Rectal daily PRN Constipation  dextrose 40% Gel 15 Gram(s) Oral once PRN Blood Glucose LESS THAN 70 milliGRAM(s)/deciliter  glucagon  Injectable 1 milliGRAM(s) IntraMuscular once PRN Glucose LESS THAN 70 milligrams/deciliter  lactulose Syrup 10 Gram(s) Oral three times a day PRN colnstipaiton      Allergies    Accupril (Other)    Intolerances          LABS:      12-11    136  |  96<L>  |  80.0<H>  ----------------------------<  153<H>  5.2   |  32.0<H>  |  1.68<H>    Ca    8.5<L>      11 Dec 2018 11:33  Phos  3.9     12-10  Mg     2.3     12-10            RADIOLOGY & ADDITIONAL TESTS:

## 2018-12-11 NOTE — PROGRESS NOTE ADULT - PROBLEM SELECTOR PLAN 2
Started on oral diuretics.  Monitor renal function. Started on oral diuretics.  Monitor renal function.  Likely etiology ATN secondary to contrast induced nephropathy.

## 2018-12-11 NOTE — PROGRESS NOTE ADULT - ATTENDING COMMENTS
Discussed with patient and RN.  If renal function and respiratory status remains stable, anticipate discharge in 24-48 hrs.

## 2018-12-11 NOTE — PROGRESS NOTE ADULT - ASSESSMENT
62 y/o female with CAD s/p CABG and PCI, PAD s/p balloon angioplasty, diabetes mellitus, hypertension, hyperlipidemia admitted with complaints of worsening exertional shortness of breath. She was evaluated by cardiology and started on IV diuretics, for acute on chronic diastolic CHF. Stress test was done, showed inferior ischemia, medical management advised. She was noted to have CO2 narcosis, placed on IV. Cardiology planned for right heart catheterisation given RV dysfunction. Dobutamine was given for RV dysfunction. She was noted to have contrast induced nephropathy, diuretics were held. Nephrology consulted, Losartan was held. IR was consulted for pleural effusion, underwent right thoracentesis. RRT was called, for lethargy, related to hypercapnia, improved with NIV. Diuretics were reinstated given worsening symptoms. Her renal function improved. Venofer was added for anemia. PT consulted, advised SINGH. Given improvement in respiratory status, she was started on oral diuretics.

## 2018-12-11 NOTE — PROGRESS NOTE ADULT - ASSESSMENT
ARF: likely renal hypoperfusion due to diuretics  CHF (EF= 70%; mild pulm HTN)==> diastolic dysfunction  Renal sono repeated and unremarkable  CHF now compensated  - continue diuretics to keep azotemic  - daily weights  - follow labs    Anemia: % Saturation, Iron: 10 % (12.08.18 @ 09:38); Ferritin, Serum: 116 ng/mL (12.08.18 @ 09:38)  - cont QUENTIN  - cont Venofer  - trend labs; target Hgb=10.0

## 2018-12-11 NOTE — PROGRESS NOTE ADULT - SUBJECTIVE AND OBJECTIVE BOX
NEPHROLOGY INTERVAL HPI/OVERNIGHT EVENTS:  pt comfortable when seen earlier today    MEDICATIONS  (STANDING):  aspirin  chewable 81 milliGRAM(s) Oral daily  atorvastatin 40 milliGRAM(s) Oral at bedtime  carvedilol 25 milliGRAM(s) Oral every 12 hours  clopidogrel Tablet 75 milliGRAM(s) Oral daily  dextrose 5%. 1000 milliLiter(s) (50 mL/Hr) IV Continuous <Continuous>  dextrose 50% Injectable 12.5 Gram(s) IV Push once  dextrose 50% Injectable 25 Gram(s) IV Push once  dextrose 50% Injectable 25 Gram(s) IV Push once  docusate sodium 100 milliGRAM(s) Oral two times a day  DULoxetine 60 milliGRAM(s) Oral daily  epoetin sara Injectable 50320 Unit(s) SubCutaneous <User Schedule>  gabapentin 100 milliGRAM(s) Oral three times a day  insulin glargine Injectable (LANTUS) 7 Unit(s) SubCutaneous at bedtime  insulin lispro (HumaLOG) corrective regimen sliding scale   SubCutaneous three times a day before meals  iron sucrose IVPB 250 milliGRAM(s) IV Intermittent <User Schedule>  isosorbide   mononitrate ER Tablet (IMDUR) 60 milliGRAM(s) Oral daily  pantoprazole    Tablet 40 milliGRAM(s) Oral before breakfast  torsemide 40 milliGRAM(s) Oral two times a day    MEDICATIONS  (PRN):  acetaminophen   Tablet .. 650 milliGRAM(s) Oral every 6 hours PRN Temp greater or equal to 38C (100.4F), Mild Pain (1 - 3)  bisacodyl Suppository 10 milliGRAM(s) Rectal daily PRN Constipation  dextrose 40% Gel 15 Gram(s) Oral once PRN Blood Glucose LESS THAN 70 milliGRAM(s)/deciliter  glucagon  Injectable 1 milliGRAM(s) IntraMuscular once PRN Glucose LESS THAN 70 milligrams/deciliter  lactulose Syrup 10 Gram(s) Oral three times a day PRN colnstipaiton      Allergies    Accupril (Other)        Vital Signs Last 24 Hrs  T(C): 36.4 (10 Dec 2018 23:50), Max: 36.4 (10 Dec 2018 23:50)  T(F): 97.6 (10 Dec 2018 23:50), Max: 97.6 (10 Dec 2018 23:50)  HR: 65 (10 Dec 2018 23:50) (65 - 66)  BP: 126/69 (10 Dec 2018 23:50) (126/69 - 144/75)  BP(mean): --  RR: 18 (10 Dec 2018 23:50) (18 - 18)  SpO2: 98% (11 Dec 2018 03:26) (95% - 98%)    PHYSICAL EXAM:  GENERAL: NAD  HEENT: opacified R eye  NECK: Supple, +JVD  NERVOUS SYSTEM:  Alert & Oriented X3  CHEST/LUNG: Diminished bilateral BS  HEART: Regular rate and rhythm; no rub  ABDOMEN: Soft, Nontender,+ distention; +BS; + flank edema  EXTREMITIES:  + LE edema    LABS:    12-10    136  |  95<L>  |  77.0<H>  ----------------------------<  189<H>  5.0   |  29.0  |  1.63<H>    Ca    8.5<L>      10 Dec 2018 09:32  Phos  3.9     12-10  Mg     2.3     12-10              RADIOLOGY & ADDITIONAL TESTS:

## 2018-12-12 LAB
ANION GAP SERPL CALC-SCNC: 8 MMOL/L — SIGNIFICANT CHANGE UP (ref 5–17)
BUN SERPL-MCNC: 81 MG/DL — HIGH (ref 8–20)
CALCIUM SERPL-MCNC: 8.6 MG/DL — SIGNIFICANT CHANGE UP (ref 8.6–10.2)
CHLORIDE SERPL-SCNC: 94 MMOL/L — LOW (ref 98–107)
CO2 SERPL-SCNC: 34 MMOL/L — HIGH (ref 22–29)
CREAT SERPL-MCNC: 1.58 MG/DL — HIGH (ref 0.5–1.3)
GLUCOSE BLDC GLUCOMTR-MCNC: 144 MG/DL — HIGH (ref 70–99)
GLUCOSE BLDC GLUCOMTR-MCNC: 245 MG/DL — HIGH (ref 70–99)
GLUCOSE BLDC GLUCOMTR-MCNC: 245 MG/DL — HIGH (ref 70–99)
GLUCOSE BLDC GLUCOMTR-MCNC: 257 MG/DL — HIGH (ref 70–99)
GLUCOSE SERPL-MCNC: 129 MG/DL — HIGH (ref 70–115)
HCT VFR BLD CALC: 23.5 % — LOW (ref 37–47)
HGB BLD-MCNC: 8 G/DL — LOW (ref 12–16)
INTERPRETATION SERPL IFE-IMP: SIGNIFICANT CHANGE UP
MAGNESIUM SERPL-MCNC: 2.3 MG/DL — SIGNIFICANT CHANGE UP (ref 1.8–2.6)
MCHC RBC-ENTMCNC: 27.3 PG — SIGNIFICANT CHANGE UP (ref 27–31)
MCHC RBC-ENTMCNC: 34 G/DL — SIGNIFICANT CHANGE UP (ref 32–36)
MCV RBC AUTO: 80.2 FL — LOW (ref 81–99)
PHOSPHATE SERPL-MCNC: 3.5 MG/DL — SIGNIFICANT CHANGE UP (ref 2.4–4.7)
PLATELET # BLD AUTO: 183 K/UL — SIGNIFICANT CHANGE UP (ref 150–400)
POTASSIUM SERPL-MCNC: 4.8 MMOL/L — SIGNIFICANT CHANGE UP (ref 3.5–5.3)
POTASSIUM SERPL-SCNC: 4.8 MMOL/L — SIGNIFICANT CHANGE UP (ref 3.5–5.3)
RBC # BLD: 2.93 M/UL — LOW (ref 4.4–5.2)
RBC # FLD: 18.3 % — HIGH (ref 11–15.6)
SODIUM SERPL-SCNC: 136 MMOL/L — SIGNIFICANT CHANGE UP (ref 135–145)
WBC # BLD: 5.7 K/UL — SIGNIFICANT CHANGE UP (ref 4.8–10.8)
WBC # FLD AUTO: 5.7 K/UL — SIGNIFICANT CHANGE UP (ref 4.8–10.8)

## 2018-12-12 PROCEDURE — 99233 SBSQ HOSP IP/OBS HIGH 50: CPT

## 2018-12-12 PROCEDURE — 32555 ASPIRATE PLEURA W/ IMAGING: CPT | Mod: RT

## 2018-12-12 PROCEDURE — 71045 X-RAY EXAM CHEST 1 VIEW: CPT | Mod: 26

## 2018-12-12 RX ADMIN — Medication 3: at 18:10

## 2018-12-12 RX ADMIN — PANTOPRAZOLE SODIUM 40 MILLIGRAM(S): 20 TABLET, DELAYED RELEASE ORAL at 06:05

## 2018-12-12 RX ADMIN — CARVEDILOL PHOSPHATE 25 MILLIGRAM(S): 80 CAPSULE, EXTENDED RELEASE ORAL at 06:06

## 2018-12-12 RX ADMIN — Medication 100 MILLIGRAM(S): at 06:05

## 2018-12-12 RX ADMIN — DULOXETINE HYDROCHLORIDE 60 MILLIGRAM(S): 30 CAPSULE, DELAYED RELEASE ORAL at 15:18

## 2018-12-12 RX ADMIN — CLOPIDOGREL BISULFATE 75 MILLIGRAM(S): 75 TABLET, FILM COATED ORAL at 15:18

## 2018-12-12 RX ADMIN — INSULIN GLARGINE 7 UNIT(S): 100 INJECTION, SOLUTION SUBCUTANEOUS at 21:31

## 2018-12-12 RX ADMIN — GABAPENTIN 100 MILLIGRAM(S): 400 CAPSULE ORAL at 15:20

## 2018-12-12 RX ADMIN — IRON SUCROSE 112.5 MILLIGRAM(S): 20 INJECTION, SOLUTION INTRAVENOUS at 19:09

## 2018-12-12 RX ADMIN — CARVEDILOL PHOSPHATE 25 MILLIGRAM(S): 80 CAPSULE, EXTENDED RELEASE ORAL at 18:01

## 2018-12-12 RX ADMIN — ATORVASTATIN CALCIUM 40 MILLIGRAM(S): 80 TABLET, FILM COATED ORAL at 21:31

## 2018-12-12 RX ADMIN — Medication 40 MILLIGRAM(S): at 18:00

## 2018-12-12 RX ADMIN — Medication 40 MILLIGRAM(S): at 06:05

## 2018-12-12 RX ADMIN — ISOSORBIDE MONONITRATE 60 MILLIGRAM(S): 60 TABLET, EXTENDED RELEASE ORAL at 15:18

## 2018-12-12 RX ADMIN — Medication 100 MILLIGRAM(S): at 18:01

## 2018-12-12 RX ADMIN — Medication 81 MILLIGRAM(S): at 15:18

## 2018-12-12 RX ADMIN — GABAPENTIN 100 MILLIGRAM(S): 400 CAPSULE ORAL at 21:31

## 2018-12-12 RX ADMIN — GABAPENTIN 100 MILLIGRAM(S): 400 CAPSULE ORAL at 06:05

## 2018-12-12 NOTE — PROGRESS NOTE ADULT - SUBJECTIVE AND OBJECTIVE BOX
INTERVAL HPI/OVERNIGHT EVENTS:    CC: acute on chronic hypercapnic respiratory failure improving, diastolic heart failure, KEITH improving, diabetes mellitus    Feels weak, no shortness of breath. No overnight events.    Vital Signs Last 24 Hrs  T(C): 36.9 (12 Dec 2018 08:17), Max: 36.9 (11 Dec 2018 16:35)  T(F): 98.4 (12 Dec 2018 08:17), Max: 98.5 (11 Dec 2018 23:26)  HR: 72 (12 Dec 2018 08:17) (69 - 78)  BP: 110/65 (12 Dec 2018 08:17) (110/65 - 151/74)  BP(mean): --  RR: 18 (12 Dec 2018 08:17) (18 - 18)  SpO2: 97% (12 Dec 2018 08:17) (94% - 98%)    PHYSICAL EXAM:    GENERAL: Not in distress, alert, obese  CHEST/LUNG: decreased in right, decreased in bases  HEART: Regular  ABDOMEN: Soft, BS+  EXTREMITIES:  2+edema, non tender    MEDICATIONS  (STANDING):  aspirin  chewable 81 milliGRAM(s) Oral daily  atorvastatin 40 milliGRAM(s) Oral at bedtime  carvedilol 25 milliGRAM(s) Oral every 12 hours  clopidogrel Tablet 75 milliGRAM(s) Oral daily  dextrose 5%. 1000 milliLiter(s) (50 mL/Hr) IV Continuous <Continuous>  dextrose 50% Injectable 12.5 Gram(s) IV Push once  dextrose 50% Injectable 25 Gram(s) IV Push once  dextrose 50% Injectable 25 Gram(s) IV Push once  docusate sodium 100 milliGRAM(s) Oral two times a day  DULoxetine 60 milliGRAM(s) Oral daily  epoetin sara Injectable 60179 Unit(s) SubCutaneous <User Schedule>  gabapentin 100 milliGRAM(s) Oral three times a day  insulin glargine Injectable (LANTUS) 7 Unit(s) SubCutaneous at bedtime  insulin lispro (HumaLOG) corrective regimen sliding scale   SubCutaneous three times a day before meals  iron sucrose IVPB 250 milliGRAM(s) IV Intermittent <User Schedule>  isosorbide   mononitrate ER Tablet (IMDUR) 60 milliGRAM(s) Oral daily  metolazone 5 milliGRAM(s) Oral daily  pantoprazole    Tablet 40 milliGRAM(s) Oral before breakfast  torsemide 40 milliGRAM(s) Oral two times a day    MEDICATIONS  (PRN):  acetaminophen   Tablet .. 650 milliGRAM(s) Oral every 6 hours PRN Temp greater or equal to 38C (100.4F), Mild Pain (1 - 3)  bisacodyl Suppository 10 milliGRAM(s) Rectal daily PRN Constipation  dextrose 40% Gel 15 Gram(s) Oral once PRN Blood Glucose LESS THAN 70 milliGRAM(s)/deciliter  glucagon  Injectable 1 milliGRAM(s) IntraMuscular once PRN Glucose LESS THAN 70 milligrams/deciliter  lactulose Syrup 10 Gram(s) Oral three times a day PRN colnstipaiton      Allergies    Accupril (Other)    Intolerances          LABS:                          8.0    5.7   )-----------( 183      ( 12 Dec 2018 08:15 )             23.5     12-12    136  |  94<L>  |  81.0<H>  ----------------------------<  129<H>  4.8   |  34.0<H>  |  1.58<H>    Ca    8.6      12 Dec 2018 08:15  Phos  3.5     12-12  Mg     2.3     12-12            RADIOLOGY & ADDITIONAL TESTS:

## 2018-12-12 NOTE — PROGRESS NOTE ADULT - PROBLEM SELECTOR PLAN 2
Started on oral diuretics. Creatinine better, increasing BUN, continue to monitor. Still with fluid overload, discussed with renal, ? need for HD.

## 2018-12-12 NOTE — PROGRESS NOTE ADULT - SUBJECTIVE AND OBJECTIVE BOX
Vascular & Interventional Radiology Pre-Procedure Note    Procedure Name: right thoracentesis    HPI: 63y Female with recurrent R effusion    Allergies: Accupril (Other)    Medications (Abx/Cardiac/Anticoagulation/Blood Products)  aspirin  chewable: 81 milliGRAM(s) Oral (12-11 @ 11:39)  carvedilol: 25 milliGRAM(s) Oral (12-12 @ 06:06)  clopidogrel Tablet: 75 milliGRAM(s) Oral (12-11 @ 11:39)  furosemide   Injectable: 40 milliGRAM(s) IV Push (12-11 @ 06:29)  isosorbide   mononitrate ER Tablet (IMDUR): 60 milliGRAM(s) Oral (12-11 @ 11:39)  torsemide: 40 milliGRAM(s) Oral (12-12 @ 06:05)    Data:    T(C): 36.9  HR: 72  BP: 110/65  RR: 18  SpO2: 97%    -WBC 5.7 / HgB 8.0 / Hct 23.5 / Plt 183  -Na 136 / Cl 94 / BUN 81.0 / Glucose 129  -K 4.8 / CO2 34.0 / Cr 1.58  -ALT -- / Alk Phos -- / T.Bili --    Imaging: CT and CXRs reviewed demonstrated recurrent R effusion

## 2018-12-12 NOTE — PROGRESS NOTE ADULT - PROBLEM SELECTOR PLAN 1
agree with current therapy. Now on Metolazone.   needs daily weights to assess status.   Patient with very poor long term prognosis.

## 2018-12-12 NOTE — PROGRESS NOTE ADULT - PROBLEM SELECTOR PLAN 1
Started on Metolazone in addition to Torsemide, monitor daily weights, IR consulted for thoracentesis.

## 2018-12-12 NOTE — PROGRESS NOTE ADULT - SUBJECTIVE AND OBJECTIVE BOX
NEPHROLOGY INTERVAL HPI/OVERNIGHT EVENTS:  pt complains of weakness.    MEDICATIONS  (STANDING):  aspirin  chewable 81 milliGRAM(s) Oral daily  atorvastatin 40 milliGRAM(s) Oral at bedtime  carvedilol 25 milliGRAM(s) Oral every 12 hours  clopidogrel Tablet 75 milliGRAM(s) Oral daily  dextrose 5%. 1000 milliLiter(s) (50 mL/Hr) IV Continuous <Continuous>  dextrose 50% Injectable 12.5 Gram(s) IV Push once  dextrose 50% Injectable 25 Gram(s) IV Push once  dextrose 50% Injectable 25 Gram(s) IV Push once  docusate sodium 100 milliGRAM(s) Oral two times a day  DULoxetine 60 milliGRAM(s) Oral daily  epoetin sara Injectable 54999 Unit(s) SubCutaneous <User Schedule>  gabapentin 100 milliGRAM(s) Oral three times a day  insulin glargine Injectable (LANTUS) 7 Unit(s) SubCutaneous at bedtime  insulin lispro (HumaLOG) corrective regimen sliding scale   SubCutaneous three times a day before meals  iron sucrose IVPB 250 milliGRAM(s) IV Intermittent <User Schedule>  isosorbide   mononitrate ER Tablet (IMDUR) 60 milliGRAM(s) Oral daily  pantoprazole    Tablet 40 milliGRAM(s) Oral before breakfast  torsemide 40 milliGRAM(s) Oral two times a day    MEDICATIONS  (PRN):  acetaminophen   Tablet .. 650 milliGRAM(s) Oral every 6 hours PRN Temp greater or equal to 38C (100.4F), Mild Pain (1 - 3)  bisacodyl Suppository 10 milliGRAM(s) Rectal daily PRN Constipation  dextrose 40% Gel 15 Gram(s) Oral once PRN Blood Glucose LESS THAN 70 milliGRAM(s)/deciliter  glucagon  Injectable 1 milliGRAM(s) IntraMuscular once PRN Glucose LESS THAN 70 milligrams/deciliter  lactulose Syrup 10 Gram(s) Oral three times a day PRN colnstipaiton      Allergies    Accupril (Other)        Vital Signs Last 24 Hrs    T(C): 36.9 (12 Dec 2018 08:17), Max: 36.9 (11 Dec 2018 16:35)  T(F): 98.4 (12 Dec 2018 08:17), Max: 98.5 (11 Dec 2018 23:26)  HR: 72 (12 Dec 2018 08:17) (67 - 78)  BP: 110/65 (12 Dec 2018 08:17) (110/65 - 151/74)  BP(mean): --  RR: 18 (12 Dec 2018 08:17) (18 - 18)  SpO2: 97% (12 Dec 2018 08:17) (94% - 98%)  T(C): 36.4 (10 Dec 2018 23:50), Max: 36.4 (10 Dec 2018 23:50)  T(F): 97.6 (10 Dec 2018 23:50), Max: 97.6 (10 Dec 2018 23:50)  HR: 65 (10 Dec 2018 23:50) (65 - 66)  BP: 126/69 (10 Dec 2018 23:50) (126/69 - 144/75)  BP(mean): --  RR: 18 (10 Dec 2018 23:50) (18 - 18)  SpO2: 98% (11 Dec 2018 03:26) (95% - 98%)    PHYSICAL EXAM:  GENERAL: NAD  HEENT: same  NECK: Supple, +JVD  NERVOUS SYSTEM:  Alert & Oriented in bed  CHEST/LUNG: Diminished bilateral BS  HEART: Regular rate and rhythm; no rub  ABDOMEN: Soft, Nontender,+ distention; +BS; + flank edema  EXTREMITIES:  pitting leg edema    LABS:    12-10    136  |  95<L>  |  77.0<H>  ----------------------------<  189<H>  5.0   |  29.0  |  1.63<H>    Ca    8.5<L>      10 Dec 2018 09:32  Phos  3.9     12-10  Mg     2.3     12-10              RADIOLOGY & ADDITIONAL TESTS:

## 2018-12-12 NOTE — PROGRESS NOTE ADULT - SUBJECTIVE AND OBJECTIVE BOX
[place] [facility]                                                        Office: [address] [address]                                                       Telephone: [phone]. Fax: [phone]      CC: Shortness of breath    INTERVAL HISTORY: Stable shortness of breath.     MEDICATIONS  (STANDING):  aspirin  chewable 81 milliGRAM(s) Oral daily  atorvastatin 40 milliGRAM(s) Oral at bedtime  carvedilol 25 milliGRAM(s) Oral every 12 hours  clopidogrel Tablet 75 milliGRAM(s) Oral daily  dextrose 5%. 1000 milliLiter(s) (50 mL/Hr) IV Continuous <Continuous>  dextrose 50% Injectable 12.5 Gram(s) IV Push once  dextrose 50% Injectable 25 Gram(s) IV Push once  dextrose 50% Injectable 25 Gram(s) IV Push once  docusate sodium 100 milliGRAM(s) Oral two times a day  DULoxetine 60 milliGRAM(s) Oral daily  epoetin sara Injectable 87675 Unit(s) SubCutaneous <User Schedule>  gabapentin 100 milliGRAM(s) Oral three times a day  insulin glargine Injectable (LANTUS) 7 Unit(s) SubCutaneous at bedtime  insulin lispro (HumaLOG) corrective regimen sliding scale   SubCutaneous three times a day before meals  iron sucrose IVPB 250 milliGRAM(s) IV Intermittent <User Schedule>  isosorbide   mononitrate ER Tablet (IMDUR) 60 milliGRAM(s) Oral daily  metolazone 5 milliGRAM(s) Oral daily  pantoprazole    Tablet 40 milliGRAM(s) Oral before breakfast  torsemide 40 milliGRAM(s) Oral two times a day    ROS: All others negative     PHYSICAL EXAM:  T(C): 36.9 (12-12-18 @ 08:17), Max: 36.9 (12-11-18 @ 16:35)  HR: 72 (12-12-18 @ 08:17) (69 - 78)  BP: 110/65 (12-12-18 @ 08:17) (110/65 - 151/74)  RR: 18 (12-12-18 @ 08:17) (18 - 18)  SpO2: 97% (12-12-18 @ 08:17) (94% - 98%)  Wt(kg): --  I&O's Summary    11 Dec 2018 07:01  -  12 Dec 2018 07:00  --------------------------------------------------------  IN: 240 mL / OUT: 175 mL / NET: 65 mL      Appearance: Normal	  HEENT:  limited vision.   Lymphatic: No lymphadenopathy  Cardiovascular: Normal S1 S2, No JVD, No murmurs. + edema.   Respiratory: Lungs clear to auscultation	  Psychiatry: A & O x 3, Mood & affect appropriate  Gastrointestinal:  Soft, Non-tender, + BS	  Skin: No rashes, No ecchymoses, No cyanosis  Neurologic: Non-focal  Extremities: + edema.   Vascular: Peripheral pulses palpable 2+ bilaterally    TELEMETRY: 	      LABS:	 	                        8.0    5.7   )-----------( 183      ( 12 Dec 2018 08:15 )             23.5     12-12    136  |  94<L>  |  81.0<H>  ----------------------------<  129<H>  4.8   |  34.0<H>  |  1.58<H>    Ca    8.6      12 Dec 2018 08:15  Phos  3.5     12-12  Mg     2.3     12-12

## 2018-12-12 NOTE — PROGRESS NOTE ADULT - ASSESSMENT
62 y/o female with CAD s/p CABG and PCI, PAD s/p balloon angioplasty, diabetes mellitus, hypertension, hyperlipidemia admitted with complaints of worsening exertional shortness of breath. She was evaluated by cardiology and started on IV diuretics, for acute on chronic diastolic CHF. Stress test was done, showed inferior ischemia, medical management advised. She was noted to have CO2 narcosis, placed on NIV. Cardiology planned for right heart catheterisation given RV dysfunction. Dobutamine was given for RV dysfunction. She was noted to have contrast induced nephropathy, diuretics were held. Nephrology consulted, Losartan was held. IR was consulted for pleural effusion, underwent right thoracentesis. RRT was called, for lethargy, related to hypercapnia, improved with NIV. Diuretics were reinstated given worsening symptoms. Her renal function improved. Venofer was added for anemia. PT consulted, advised SINGH. Given improvement in respiratory status, she was started on oral diuretics. Her CXR showed opacification of right lung. Metolazone was added by nephrology for fluid overload. IR consulted for right thoracentesis.

## 2018-12-13 DIAGNOSIS — J90 PLEURAL EFFUSION, NOT ELSEWHERE CLASSIFIED: ICD-10-CM

## 2018-12-13 LAB
GLUCOSE BLDC GLUCOMTR-MCNC: 191 MG/DL — HIGH (ref 70–99)
GLUCOSE BLDC GLUCOMTR-MCNC: 200 MG/DL — HIGH (ref 70–99)
GLUCOSE BLDC GLUCOMTR-MCNC: 225 MG/DL — HIGH (ref 70–99)
GLUCOSE BLDC GLUCOMTR-MCNC: 245 MG/DL — HIGH (ref 70–99)

## 2018-12-13 PROCEDURE — 99232 SBSQ HOSP IP/OBS MODERATE 35: CPT

## 2018-12-13 PROCEDURE — 99233 SBSQ HOSP IP/OBS HIGH 50: CPT

## 2018-12-13 RX ADMIN — Medication 40 MILLIGRAM(S): at 05:44

## 2018-12-13 RX ADMIN — PANTOPRAZOLE SODIUM 40 MILLIGRAM(S): 20 TABLET, DELAYED RELEASE ORAL at 05:44

## 2018-12-13 RX ADMIN — Medication 40 MILLIGRAM(S): at 18:27

## 2018-12-13 RX ADMIN — Medication 1: at 07:59

## 2018-12-13 RX ADMIN — Medication 100 MILLIGRAM(S): at 05:44

## 2018-12-13 RX ADMIN — ISOSORBIDE MONONITRATE 60 MILLIGRAM(S): 60 TABLET, EXTENDED RELEASE ORAL at 11:43

## 2018-12-13 RX ADMIN — INSULIN GLARGINE 7 UNIT(S): 100 INJECTION, SOLUTION SUBCUTANEOUS at 21:09

## 2018-12-13 RX ADMIN — GABAPENTIN 100 MILLIGRAM(S): 400 CAPSULE ORAL at 05:44

## 2018-12-13 RX ADMIN — GABAPENTIN 100 MILLIGRAM(S): 400 CAPSULE ORAL at 21:09

## 2018-12-13 RX ADMIN — ATORVASTATIN CALCIUM 40 MILLIGRAM(S): 80 TABLET, FILM COATED ORAL at 21:09

## 2018-12-13 RX ADMIN — Medication 81 MILLIGRAM(S): at 11:43

## 2018-12-13 RX ADMIN — Medication 100 MILLIGRAM(S): at 18:27

## 2018-12-13 RX ADMIN — CARVEDILOL PHOSPHATE 25 MILLIGRAM(S): 80 CAPSULE, EXTENDED RELEASE ORAL at 05:44

## 2018-12-13 RX ADMIN — DULOXETINE HYDROCHLORIDE 60 MILLIGRAM(S): 30 CAPSULE, DELAYED RELEASE ORAL at 11:43

## 2018-12-13 RX ADMIN — CARVEDILOL PHOSPHATE 25 MILLIGRAM(S): 80 CAPSULE, EXTENDED RELEASE ORAL at 18:27

## 2018-12-13 RX ADMIN — GABAPENTIN 100 MILLIGRAM(S): 400 CAPSULE ORAL at 14:16

## 2018-12-13 RX ADMIN — Medication 2: at 16:50

## 2018-12-13 RX ADMIN — CLOPIDOGREL BISULFATE 75 MILLIGRAM(S): 75 TABLET, FILM COATED ORAL at 11:43

## 2018-12-13 RX ADMIN — Medication 1: at 11:43

## 2018-12-13 NOTE — PROGRESS NOTE ADULT - ASSESSMENT
62 y/o female with CAD s/p CABG and PCI, PAD s/p balloon angioplasty, diabetes mellitus, hypertension, hyperlipidemia admitted with complaints of worsening exertional shortness of breath. She was evaluated by cardiology and started on IV diuretics, for acute on chronic diastolic CHF. Stress test was done, showed inferior ischemia, medical management advised. She was noted to have CO2 narcosis, placed on NIV. Cardiology planned for right heart catheterisation given RV dysfunction. Dobutamine was given for RV dysfunction. She was noted to have contrast induced nephropathy, diuretics were held. Nephrology consulted, Losartan was held. IR was consulted for pleural effusion, underwent right thoracentesis. RRT was called, for lethargy, related to hypercapnia, improved with NIV. Diuretics were reinstated given worsening symptoms. Her renal function improved. Venofer was added for anemia. PT consulted, advised SINGH. Given improvement in respiratory status, she was started on oral diuretics. Her CXR showed opacification of right lung. Metolazone was added by nephrology for fluid overload. IR consulted for right thoracentesis, she underwent thoracentesis on 12/12, 1500 cc of fluid was removed.

## 2018-12-13 NOTE — PROGRESS NOTE ADULT - SUBJECTIVE AND OBJECTIVE BOX
INTERVAL HPI/OVERNIGHT EVENTS:  63yFemale    Vital Signs Last 24 Hrs  T(C): 36.8 (13 Dec 2018 08:06), Max: 36.8 (13 Dec 2018 08:06)  T(F): 98.2 (13 Dec 2018 08:06), Max: 98.2 (13 Dec 2018 08:06)  HR: 74 (13 Dec 2018 08:06) (68 - 80)  BP: 131/67 (13 Dec 2018 08:06) (126/68 - 131/67)  BP(mean): --  RR: 18 (13 Dec 2018 08:06) (18 - 20)  SpO2: 94% (13 Dec 2018 09:38) (94% - 100%)    PHYSICAL EXAM:    GENERAL: NAD, well-groomed, well-developed  HEAD:  Atraumatic, Normocephalic  EYES: EOMI, PERRLA, conjunctiva and sclera clear  ENMT: Moist mucous membranes  NECK: Supple, No JVD  NERVOUS SYSTEM:  Alert & Oriented X3, Motor Strength 5/5 B/L upper and lower extremities; DTRs 2+ intact and symmetric  CHEST/LUNG: Clear to auscultation bilaterally; No rales, rhonchi, wheezing, or rubs  HEART: Regular rate and rhythm; No murmurs, rubs, or gallops  ABDOMEN: Soft, Nontender, Nondistended; Bowel sounds present  EXTREMITIES:  2+ Peripheral Pulses, No clubbing, cyanosis, or edema    MEDICATIONS  (STANDING):  aspirin  chewable 81 milliGRAM(s) Oral daily  atorvastatin 40 milliGRAM(s) Oral at bedtime  carvedilol 25 milliGRAM(s) Oral every 12 hours  clopidogrel Tablet 75 milliGRAM(s) Oral daily  dextrose 5%. 1000 milliLiter(s) (50 mL/Hr) IV Continuous <Continuous>  dextrose 50% Injectable 12.5 Gram(s) IV Push once  dextrose 50% Injectable 25 Gram(s) IV Push once  dextrose 50% Injectable 25 Gram(s) IV Push once  docusate sodium 100 milliGRAM(s) Oral two times a day  DULoxetine 60 milliGRAM(s) Oral daily  epoetin sara Injectable 83367 Unit(s) SubCutaneous <User Schedule>  gabapentin 100 milliGRAM(s) Oral three times a day  insulin glargine Injectable (LANTUS) 7 Unit(s) SubCutaneous at bedtime  insulin lispro (HumaLOG) corrective regimen sliding scale   SubCutaneous three times a day before meals  isosorbide   mononitrate ER Tablet (IMDUR) 60 milliGRAM(s) Oral daily  metolazone 2.5 milliGRAM(s) Oral daily  pantoprazole    Tablet 40 milliGRAM(s) Oral before breakfast  torsemide 40 milliGRAM(s) Oral two times a day    MEDICATIONS  (PRN):  acetaminophen   Tablet .. 650 milliGRAM(s) Oral every 6 hours PRN Temp greater or equal to 38C (100.4F), Mild Pain (1 - 3)  bisacodyl Suppository 10 milliGRAM(s) Rectal daily PRN Constipation  dextrose 40% Gel 15 Gram(s) Oral once PRN Blood Glucose LESS THAN 70 milliGRAM(s)/deciliter  glucagon  Injectable 1 milliGRAM(s) IntraMuscular once PRN Glucose LESS THAN 70 milligrams/deciliter  lactulose Syrup 10 Gram(s) Oral three times a day PRN colnstipaiton      Allergies    Accupril (Other)    Intolerances          LABS:                          8.0    5.7   )-----------( 183      ( 12 Dec 2018 08:15 )             23.5     12-12    136  |  94<L>  |  81.0<H>  ----------------------------<  129<H>  4.8   |  34.0<H>  |  1.58<H>    Ca    8.6      12 Dec 2018 08:15  Phos  3.5     12-12  Mg     2.3     12-12            RADIOLOGY & ADDITIONAL TESTS: INTERVAL HPI/OVERNIGHT EVENTS:    CC:  acute on chronic hypercapnic respiratory failure improving, diastolic heart failure, KEITH improving, diabetes mellitus          Vital Signs Last 24 Hrs  T(C): 36.8 (13 Dec 2018 08:06), Max: 36.8 (13 Dec 2018 08:06)  T(F): 98.2 (13 Dec 2018 08:06), Max: 98.2 (13 Dec 2018 08:06)  HR: 74 (13 Dec 2018 08:06) (68 - 80)  BP: 131/67 (13 Dec 2018 08:06) (126/68 - 131/67)  BP(mean): --  RR: 18 (13 Dec 2018 08:06) (18 - 20)  SpO2: 94% (13 Dec 2018 09:38) (94% - 100%)    PHYSICAL EXAM:    GENERAL: NAD, well-groomed, well-developed  HEAD:  Atraumatic, Normocephalic  EYES: EOMI, PERRLA, conjunctiva and sclera clear  ENMT: Moist mucous membranes  NECK: Supple, No JVD  NERVOUS SYSTEM:  Alert & Oriented X3, Motor Strength 5/5 B/L upper and lower extremities; DTRs 2+ intact and symmetric  CHEST/LUNG: Clear to auscultation bilaterally; No rales, rhonchi, wheezing, or rubs  HEART: Regular rate and rhythm; No murmurs, rubs, or gallops  ABDOMEN: Soft, Nontender, Nondistended; Bowel sounds present  EXTREMITIES:  2+ Peripheral Pulses, No clubbing, cyanosis, or edema    MEDICATIONS  (STANDING):  aspirin  chewable 81 milliGRAM(s) Oral daily  atorvastatin 40 milliGRAM(s) Oral at bedtime  carvedilol 25 milliGRAM(s) Oral every 12 hours  clopidogrel Tablet 75 milliGRAM(s) Oral daily  dextrose 5%. 1000 milliLiter(s) (50 mL/Hr) IV Continuous <Continuous>  dextrose 50% Injectable 12.5 Gram(s) IV Push once  dextrose 50% Injectable 25 Gram(s) IV Push once  dextrose 50% Injectable 25 Gram(s) IV Push once  docusate sodium 100 milliGRAM(s) Oral two times a day  DULoxetine 60 milliGRAM(s) Oral daily  epoetin sara Injectable 20655 Unit(s) SubCutaneous <User Schedule>  gabapentin 100 milliGRAM(s) Oral three times a day  insulin glargine Injectable (LANTUS) 7 Unit(s) SubCutaneous at bedtime  insulin lispro (HumaLOG) corrective regimen sliding scale   SubCutaneous three times a day before meals  isosorbide   mononitrate ER Tablet (IMDUR) 60 milliGRAM(s) Oral daily  metolazone 2.5 milliGRAM(s) Oral daily  pantoprazole    Tablet 40 milliGRAM(s) Oral before breakfast  torsemide 40 milliGRAM(s) Oral two times a day    MEDICATIONS  (PRN):  acetaminophen   Tablet .. 650 milliGRAM(s) Oral every 6 hours PRN Temp greater or equal to 38C (100.4F), Mild Pain (1 - 3)  bisacodyl Suppository 10 milliGRAM(s) Rectal daily PRN Constipation  dextrose 40% Gel 15 Gram(s) Oral once PRN Blood Glucose LESS THAN 70 milliGRAM(s)/deciliter  glucagon  Injectable 1 milliGRAM(s) IntraMuscular once PRN Glucose LESS THAN 70 milligrams/deciliter  lactulose Syrup 10 Gram(s) Oral three times a day PRN colnstipaiton      Allergies    Accupril (Other)    Intolerances          LABS:                          8.0    5.7   )-----------( 183      ( 12 Dec 2018 08:15 )             23.5     12-12    136  |  94<L>  |  81.0<H>  ----------------------------<  129<H>  4.8   |  34.0<H>  |  1.58<H>    Ca    8.6      12 Dec 2018 08:15  Phos  3.5     12-12  Mg     2.3     12-12            RADIOLOGY & ADDITIONAL TESTS: INTERVAL HPI/OVERNIGHT EVENTS:    CC:  acute on chronic hypercapnic respiratory failure improving, diastolic heart failure, KEITH improving, diabetes mellitus        Feels better since fluid removal, no shortness of breath          Vital Signs Last 24 Hrs  T(C): 36.8 (13 Dec 2018 08:06), Max: 36.8 (13 Dec 2018 08:06)  T(F): 98.2 (13 Dec 2018 08:06), Max: 98.2 (13 Dec 2018 08:06)  HR: 74 (13 Dec 2018 08:06) (68 - 80)  BP: 131/67 (13 Dec 2018 08:06) (126/68 - 131/67)  BP(mean): --  RR: 18 (13 Dec 2018 08:06) (18 - 20)  SpO2: 94% (13 Dec 2018 09:38) (94% - 100%)    PHYSICAL EXAM:    GENERAL: Not in distress, alert  CHEST/LUNG: b/l air entry  HEART: Regular   ABDOMEN: Soft, BS+  EXTREMITIES:  2+ edema, non tender    MEDICATIONS  (STANDING):  aspirin  chewable 81 milliGRAM(s) Oral daily  atorvastatin 40 milliGRAM(s) Oral at bedtime  carvedilol 25 milliGRAM(s) Oral every 12 hours  clopidogrel Tablet 75 milliGRAM(s) Oral daily  dextrose 5%. 1000 milliLiter(s) (50 mL/Hr) IV Continuous <Continuous>  dextrose 50% Injectable 12.5 Gram(s) IV Push once  dextrose 50% Injectable 25 Gram(s) IV Push once  dextrose 50% Injectable 25 Gram(s) IV Push once  docusate sodium 100 milliGRAM(s) Oral two times a day  DULoxetine 60 milliGRAM(s) Oral daily  epoetin sara Injectable 64117 Unit(s) SubCutaneous <User Schedule>  gabapentin 100 milliGRAM(s) Oral three times a day  insulin glargine Injectable (LANTUS) 7 Unit(s) SubCutaneous at bedtime  insulin lispro (HumaLOG) corrective regimen sliding scale   SubCutaneous three times a day before meals  isosorbide   mononitrate ER Tablet (IMDUR) 60 milliGRAM(s) Oral daily  metolazone 2.5 milliGRAM(s) Oral daily  pantoprazole    Tablet 40 milliGRAM(s) Oral before breakfast  torsemide 40 milliGRAM(s) Oral two times a day    MEDICATIONS  (PRN):  acetaminophen   Tablet .. 650 milliGRAM(s) Oral every 6 hours PRN Temp greater or equal to 38C (100.4F), Mild Pain (1 - 3)  bisacodyl Suppository 10 milliGRAM(s) Rectal daily PRN Constipation  dextrose 40% Gel 15 Gram(s) Oral once PRN Blood Glucose LESS THAN 70 milliGRAM(s)/deciliter  glucagon  Injectable 1 milliGRAM(s) IntraMuscular once PRN Glucose LESS THAN 70 milligrams/deciliter  lactulose Syrup 10 Gram(s) Oral three times a day PRN colnstipaiton      Allergies    Accupril (Other)    Intolerances          LABS:                          8.0    5.7   )-----------( 183      ( 12 Dec 2018 08:15 )             23.5     12-12    136  |  94<L>  |  81.0<H>  ----------------------------<  129<H>  4.8   |  34.0<H>  |  1.58<H>    Ca    8.6      12 Dec 2018 08:15  Phos  3.5     12-12  Mg     2.3     12-12            RADIOLOGY & ADDITIONAL TESTS:

## 2018-12-13 NOTE — PROGRESS NOTE ADULT - SUBJECTIVE AND OBJECTIVE BOX
NEPHROLOGY INTERVAL HPI/OVERNIGHT EVENTS:  pt feels better since  thoracentesis.    MEDICATIONS  (STANDING):  aspirin  chewable 81 milliGRAM(s) Oral daily  atorvastatin 40 milliGRAM(s) Oral at bedtime  carvedilol 25 milliGRAM(s) Oral every 12 hours  clopidogrel Tablet 75 milliGRAM(s) Oral daily  dextrose 5%. 1000 milliLiter(s) (50 mL/Hr) IV Continuous <Continuous>  dextrose 50% Injectable 12.5 Gram(s) IV Push once  dextrose 50% Injectable 25 Gram(s) IV Push once  dextrose 50% Injectable 25 Gram(s) IV Push once  docusate sodium 100 milliGRAM(s) Oral two times a day  DULoxetine 60 milliGRAM(s) Oral daily  epoetin sara Injectable 80715 Unit(s) SubCutaneous <User Schedule>  gabapentin 100 milliGRAM(s) Oral three times a day  insulin glargine Injectable (LANTUS) 7 Unit(s) SubCutaneous at bedtime  insulin lispro (HumaLOG) corrective regimen sliding scale   SubCutaneous three times a day before meals  iron sucrose IVPB 250 milliGRAM(s) IV Intermittent <User Schedule>  isosorbide   mononitrate ER Tablet (IMDUR) 60 milliGRAM(s) Oral daily  pantoprazole    Tablet 40 milliGRAM(s) Oral before breakfast  torsemide 40 milliGRAM(s) Oral two times a day    MEDICATIONS  (PRN):  acetaminophen   Tablet .. 650 milliGRAM(s) Oral every 6 hours PRN Temp greater or equal to 38C (100.4F), Mild Pain (1 - 3)  bisacodyl Suppository 10 milliGRAM(s) Rectal daily PRN Constipation  dextrose 40% Gel 15 Gram(s) Oral once PRN Blood Glucose LESS THAN 70 milliGRAM(s)/deciliter  glucagon  Injectable 1 milliGRAM(s) IntraMuscular once PRN Glucose LESS THAN 70 milligrams/deciliter  lactulose Syrup 10 Gram(s) Oral three times a day PRN colnstipaiton      Allergies    Accupril (Other)        Vital Signs Last 24 HrsVital Signs Last 24 Hrs  T(C): 36.8 (13 Dec 2018 08:06), Max: 36.8 (13 Dec 2018 08:06)  T(F): 98.2 (13 Dec 2018 08:06), Max: 98.2 (13 Dec 2018 08:06)  HR: 74 (13 Dec 2018 08:06) (68 - 80)  BP: 131/67 (13 Dec 2018 08:06) (126/68 - 131/67)  BP(mean): --  RR: 18 (13 Dec 2018 08:06) (18 - 20)  SpO2: 99% (13 Dec 2018 08:06) (95% - 100%)    T(C): 36.9 (12 Dec 2018 08:17), Max: 36.9 (11 Dec 2018 16:35)  T(F): 98.4 (12 Dec 2018 08:17), Max: 98.5 (11 Dec 2018 23:26)  HR: 72 (12 Dec 2018 08:17) (67 - 78)  BP: 110/65 (12 Dec 2018 08:17) (110/65 - 151/74)  BP(mean): --  RR: 18 (12 Dec 2018 08:17) (18 - 18)  SpO2: 97% (12 Dec 2018 08:17) (94% - 98%)  T(C): 36.4 (10 Dec 2018 23:50), Max: 36.4 (10 Dec 2018 23:50)  T(F): 97.6 (10 Dec 2018 23:50), Max: 97.6 (10 Dec 2018 23:50)  HR: 65 (10 Dec 2018 23:50) (65 - 66)  BP: 126/69 (10 Dec 2018 23:50) (126/69 - 144/75)  BP(mean): --  RR: 18 (10 Dec 2018 23:50) (18 - 18)  SpO2: 98% (11 Dec 2018 03:26) (95% - 98%)    PHYSICAL EXAM:  GENERAL: NAD  HEENT: same  NECK: Supple, +JVD  NERVOUS SYSTEM:  Alert & Oriented in bed  CHEST/LUNG: Diminished BS right side, no wheezes  HEART: Regular rate and rhythm; no rub  ABDOMEN: Soft, Nontender,+ distention; +BS; + flank edema  EXTREMITIES:   leg edema better   neg    LABS:    12-12    136  |  94<L>  |  81.0<H>  ----------------------------<  129<H>  4.8   |  34.0<H>  |  1.58<H>    Ca    8.6      12 Dec 2018 08:15  Phos  3.5     12-12  Mg     2.3     12-12        12-10    136  |  95<L>  |  77.0<H>  ----------------------------<  189<H>  5.0   |  29.0  |  1.63<H>    Ca    8.5<L>      10 Dec 2018 09:32  Phos  3.9     12-10  Mg     2.3     12-10              RADIOLOGY & ADDITIONAL TESTS:

## 2018-12-13 NOTE — PROGRESS NOTE ADULT - ASSESSMENT
Chronic Hypercapneic vent failure  CHF  Known KYREE/OHS  Requires continued nocturnal vent support  Repeat thor yesterday;   thor was without event    She is OOB in chair without any distress    Baseline pulmonary congestion noted on the CXR    Continue nocturnal vent support, noninvasive

## 2018-12-13 NOTE — PROGRESS NOTE ADULT - SUBJECTIVE AND OBJECTIVE BOX
PULMONARY PROGRESS NOTE      RANJIT [id number]    Patient is a 63y old  Female who presents with a chief complaint of Shortness of breath (13 Dec 2018 09:43)      INTERVAL HPI/OVERNIGHT EVENTS:    MEDICATIONS  (STANDING):  aspirin  chewable 81 milliGRAM(s) Oral daily  atorvastatin 40 milliGRAM(s) Oral at bedtime  carvedilol 25 milliGRAM(s) Oral every 12 hours  clopidogrel Tablet 75 milliGRAM(s) Oral daily  dextrose 5%. 1000 milliLiter(s) (50 mL/Hr) IV Continuous <Continuous>  dextrose 50% Injectable 12.5 Gram(s) IV Push once  dextrose 50% Injectable 25 Gram(s) IV Push once  dextrose 50% Injectable 25 Gram(s) IV Push once  docusate sodium 100 milliGRAM(s) Oral two times a day  DULoxetine 60 milliGRAM(s) Oral daily  epoetin sara Injectable 30738 Unit(s) SubCutaneous <User Schedule>  gabapentin 100 milliGRAM(s) Oral three times a day  insulin glargine Injectable (LANTUS) 7 Unit(s) SubCutaneous at bedtime  insulin lispro (HumaLOG) corrective regimen sliding scale   SubCutaneous three times a day before meals  isosorbide   mononitrate ER Tablet (IMDUR) 60 milliGRAM(s) Oral daily  metolazone 2.5 milliGRAM(s) Oral daily  pantoprazole    Tablet 40 milliGRAM(s) Oral before breakfast  torsemide 40 milliGRAM(s) Oral two times a day      MEDICATIONS  (PRN):  acetaminophen   Tablet .. 650 milliGRAM(s) Oral every 6 hours PRN Temp greater or equal to 38C (100.4F), Mild Pain (1 - 3)  bisacodyl Suppository 10 milliGRAM(s) Rectal daily PRN Constipation  dextrose 40% Gel 15 Gram(s) Oral once PRN Blood Glucose LESS THAN 70 milliGRAM(s)/deciliter  glucagon  Injectable 1 milliGRAM(s) IntraMuscular once PRN Glucose LESS THAN 70 milligrams/deciliter  lactulose Syrup 10 Gram(s) Oral three times a day PRN colnstipaiton      Allergies    Accupril (Other)    Intolerances        PAST MEDICAL & SURGICAL HISTORY:  Herniated disc, cervical  PAD (peripheral artery disease)  Legally blind  History of peripheral vascular disease  History of retinal detachment  History of CEA (carotid endarterectomy): Right  Hyperlipidemia  Glaucoma  Hypertension  Diabetes  S/P CABG x 1  After cataract  Uveitic glaucoma of both eyes  Detached retina  Atherosclerosis of right carotid artery   delivery delivered      SOCIAL HISTORY  Smoking History:       REVIEW OF SYSTEMS:    CONSTITUTIONAL:  No distress    HEENT:  Eyes:  No diplopia or blurred vision. ENT:  No earache, sore throat or runny nose.    CARDIOVASCULAR:  No pressure, squeezing, tightness, heaviness or aching about the chest; no palpitations.    RESPIRATORY:  No cough, shortness of breath, PND or orthopnea. Mild SOBOE    GASTROINTESTINAL:  No nausea, vomiting or diarrhea.    GENITOURINARY:  No dysuria, frequency or urgency.    MUSCULOSKELETAL:  No joint pain    SKIN:  No new lesions.    NEUROLOGIC:  No paresthesias, fasciculations, seizures or weakness.    PSYCHIATRIC:  No disorder of thought or mood.    ENDOCRINE:  No heat or cold intolerance, polyuria or polydipsia.    HEMATOLOGICAL:  No easy bruising or bleeding.     Vital Signs Last 24 Hrs  T(C): 36.8 (13 Dec 2018 08:06), Max: 36.8 (13 Dec 2018 08:06)  T(F): 98.2 (13 Dec 2018 08:06), Max: 98.2 (13 Dec 2018 08:06)  HR: 74 (13 Dec 2018 08:06) (68 - 80)  BP: 131/67 (13 Dec 2018 08:06) (126/68 - 131/67)  BP(mean): --  RR: 18 (13 Dec 2018 08:06) (18 - 20)  SpO2: 94% (13 Dec 2018 09:38) (94% - 100%)    PHYSICAL EXAMINATION:    GENERAL: The patient is awake and alert in no apparent distress.     HEENT: Head is normocephalic and atraumatic. Extraocular muscles are intact. Mucous membranes are moist.    NECK: Supple.    LUNGS: Clear to auscultation without wheezing, rales or rhonchi; respirations unlabored    HEART: Regular rate and rhythm without murmur.    ABDOMEN: Soft, nontender, and nondistended.      EXTREMITIES: Without any cyanosis, clubbing, rash, lesions or edema.    NEUROLOGIC: Grossly intact.    SKIN: No ulceration or induration present.      LABS:                        8.0    5.7   )-----------( 183      ( 12 Dec 2018 08:15 )             23.5     12-12    136  |  94<L>  |  81.0<H>  ----------------------------<  129<H>  4.8   |  34.0<H>  |  1.58<H>    Ca    8.6      12 Dec 2018 08:15  Phos  3.5     -  Mg     2.3                       Serum Pro-Brain Natriuretic Peptide: 5901 pg/mL (18 @ 11:32)          MICROBIOLOGY:    RADIOLOGY & ADDITIONAL STUDIES:< from: Xray Chest 1 View-PORTABLE IMMEDIATE (18 @ 13:16) >    INTERPRETATION:  INDICATION: R effusion s/p thoracentesis  COMPARISON: Prior studies including radiograph dated 2018    COMMENTS:  Portable AP view of the chest    FINDINGS/   IMPRESSION:  Interval marked improvement in aeration of the right lung field with   persistent small right pleural effusion and right basilar atelectasis.   Unchanged pulmonary vascular congestion. No appreciable pneumothorax.    Cardiomediastinal contours are unchanged. Status post median sternotomy.      < end of copied text >

## 2018-12-14 LAB
ANION GAP SERPL CALC-SCNC: 10 MMOL/L — SIGNIFICANT CHANGE UP (ref 5–17)
BUN SERPL-MCNC: 66 MG/DL — HIGH (ref 8–20)
CALCIUM SERPL-MCNC: 8.9 MG/DL — SIGNIFICANT CHANGE UP (ref 8.6–10.2)
CHLORIDE SERPL-SCNC: 95 MMOL/L — LOW (ref 98–107)
CO2 SERPL-SCNC: 35 MMOL/L — HIGH (ref 22–29)
CREAT SERPL-MCNC: 1.21 MG/DL — SIGNIFICANT CHANGE UP (ref 0.5–1.3)
GLUCOSE BLDC GLUCOMTR-MCNC: 177 MG/DL — HIGH (ref 70–99)
GLUCOSE BLDC GLUCOMTR-MCNC: 182 MG/DL — HIGH (ref 70–99)
GLUCOSE BLDC GLUCOMTR-MCNC: 250 MG/DL — HIGH (ref 70–99)
GLUCOSE BLDC GLUCOMTR-MCNC: 279 MG/DL — HIGH (ref 70–99)
GLUCOSE SERPL-MCNC: 153 MG/DL — HIGH (ref 70–115)
HCT VFR BLD CALC: 23.6 % — LOW (ref 37–47)
HGB BLD-MCNC: 8.1 G/DL — LOW (ref 12–16)
MCHC RBC-ENTMCNC: 27.3 PG — SIGNIFICANT CHANGE UP (ref 27–31)
MCHC RBC-ENTMCNC: 34.3 G/DL — SIGNIFICANT CHANGE UP (ref 32–36)
MCV RBC AUTO: 79.5 FL — LOW (ref 81–99)
PLATELET # BLD AUTO: 244 K/UL — SIGNIFICANT CHANGE UP (ref 150–400)
POTASSIUM SERPL-MCNC: 4.2 MMOL/L — SIGNIFICANT CHANGE UP (ref 3.5–5.3)
POTASSIUM SERPL-SCNC: 4.2 MMOL/L — SIGNIFICANT CHANGE UP (ref 3.5–5.3)
RBC # BLD: 2.97 M/UL — LOW (ref 4.4–5.2)
RBC # FLD: 17.7 % — HIGH (ref 11–15.6)
SODIUM SERPL-SCNC: 140 MMOL/L — SIGNIFICANT CHANGE UP (ref 135–145)
WBC # BLD: 6.6 K/UL — SIGNIFICANT CHANGE UP (ref 4.8–10.8)
WBC # FLD AUTO: 6.6 K/UL — SIGNIFICANT CHANGE UP (ref 4.8–10.8)

## 2018-12-14 PROCEDURE — 99232 SBSQ HOSP IP/OBS MODERATE 35: CPT

## 2018-12-14 RX ADMIN — Medication 100 MILLIGRAM(S): at 05:40

## 2018-12-14 RX ADMIN — CARVEDILOL PHOSPHATE 25 MILLIGRAM(S): 80 CAPSULE, EXTENDED RELEASE ORAL at 17:42

## 2018-12-14 RX ADMIN — Medication 100 MILLIGRAM(S): at 17:42

## 2018-12-14 RX ADMIN — Medication 1: at 11:32

## 2018-12-14 RX ADMIN — GABAPENTIN 100 MILLIGRAM(S): 400 CAPSULE ORAL at 05:40

## 2018-12-14 RX ADMIN — ATORVASTATIN CALCIUM 40 MILLIGRAM(S): 80 TABLET, FILM COATED ORAL at 21:24

## 2018-12-14 RX ADMIN — Medication 1: at 07:48

## 2018-12-14 RX ADMIN — ISOSORBIDE MONONITRATE 60 MILLIGRAM(S): 60 TABLET, EXTENDED RELEASE ORAL at 11:33

## 2018-12-14 RX ADMIN — INSULIN GLARGINE 7 UNIT(S): 100 INJECTION, SOLUTION SUBCUTANEOUS at 21:24

## 2018-12-14 RX ADMIN — DULOXETINE HYDROCHLORIDE 60 MILLIGRAM(S): 30 CAPSULE, DELAYED RELEASE ORAL at 11:33

## 2018-12-14 RX ADMIN — Medication 40 MILLIGRAM(S): at 05:40

## 2018-12-14 RX ADMIN — Medication 81 MILLIGRAM(S): at 11:33

## 2018-12-14 RX ADMIN — CARVEDILOL PHOSPHATE 25 MILLIGRAM(S): 80 CAPSULE, EXTENDED RELEASE ORAL at 05:40

## 2018-12-14 RX ADMIN — GABAPENTIN 100 MILLIGRAM(S): 400 CAPSULE ORAL at 21:24

## 2018-12-14 RX ADMIN — PANTOPRAZOLE SODIUM 40 MILLIGRAM(S): 20 TABLET, DELAYED RELEASE ORAL at 05:40

## 2018-12-14 RX ADMIN — Medication 2: at 16:46

## 2018-12-14 RX ADMIN — GABAPENTIN 100 MILLIGRAM(S): 400 CAPSULE ORAL at 13:48

## 2018-12-14 RX ADMIN — Medication 40 MILLIGRAM(S): at 17:42

## 2018-12-14 RX ADMIN — CLOPIDOGREL BISULFATE 75 MILLIGRAM(S): 75 TABLET, FILM COATED ORAL at 11:34

## 2018-12-14 NOTE — PROGRESS NOTE ADULT - SUBJECTIVE AND OBJECTIVE BOX
INTERVAL HPI/OVERNIGHT EVENTS:    CC: acute on chronic hypercapnic respiratory failure improving, diastolic heart failure, KEITH resolved, diabetes mellitus      No overnight events, feels less short of breath. Denies complaints. Wants to go home upon discharge, explained need for SINGH, given deconditioning, multiple medical problems, need for NIV    Vital Signs Last 24 Hrs  T(C): 36.9 (14 Dec 2018 07:43), Max: 36.9 (14 Dec 2018 07:43)  T(F): 98.4 (14 Dec 2018 07:43), Max: 98.4 (14 Dec 2018 07:43)  HR: 58 (14 Dec 2018 07:43) (58 - 66)  BP: 123/62 (14 Dec 2018 07:43) (123/62 - 131/72)  BP(mean): --  RR: 18 (14 Dec 2018 07:43) (18 - 18)  SpO2: 97% (14 Dec 2018 07:43) (97% - 100%)    PHYSICAL EXAM:    GENERAL: Not in distress, alert  CHEST/LUNG: b/l air entry, decreased in bases  HEART: Regular   ABDOMEN: Soft, BS+  EXTREMITIES:  1+ edema, non tender    MEDICATIONS  (STANDING):  aspirin  chewable 81 milliGRAM(s) Oral daily  atorvastatin 40 milliGRAM(s) Oral at bedtime  carvedilol 25 milliGRAM(s) Oral every 12 hours  clopidogrel Tablet 75 milliGRAM(s) Oral daily  dextrose 5%. 1000 milliLiter(s) (50 mL/Hr) IV Continuous <Continuous>  dextrose 50% Injectable 12.5 Gram(s) IV Push once  dextrose 50% Injectable 25 Gram(s) IV Push once  dextrose 50% Injectable 25 Gram(s) IV Push once  docusate sodium 100 milliGRAM(s) Oral two times a day  DULoxetine 60 milliGRAM(s) Oral daily  epoetin sara Injectable 80502 Unit(s) SubCutaneous <User Schedule>  gabapentin 100 milliGRAM(s) Oral three times a day  insulin glargine Injectable (LANTUS) 7 Unit(s) SubCutaneous at bedtime  insulin lispro (HumaLOG) corrective regimen sliding scale   SubCutaneous three times a day before meals  isosorbide   mononitrate ER Tablet (IMDUR) 60 milliGRAM(s) Oral daily  metolazone 2.5 milliGRAM(s) Oral daily  pantoprazole    Tablet 40 milliGRAM(s) Oral before breakfast  torsemide 40 milliGRAM(s) Oral two times a day    MEDICATIONS  (PRN):  acetaminophen   Tablet .. 650 milliGRAM(s) Oral every 6 hours PRN Temp greater or equal to 38C (100.4F), Mild Pain (1 - 3)  bisacodyl Suppository 10 milliGRAM(s) Rectal daily PRN Constipation  dextrose 40% Gel 15 Gram(s) Oral once PRN Blood Glucose LESS THAN 70 milliGRAM(s)/deciliter  glucagon  Injectable 1 milliGRAM(s) IntraMuscular once PRN Glucose LESS THAN 70 milligrams/deciliter  lactulose Syrup 10 Gram(s) Oral three times a day PRN colnstipaiton      Allergies    Accupril (Other)    Intolerances          LABS:                          8.1    6.6   )-----------( 244      ( 14 Dec 2018 08:27 )             23.6     12-14    140  |  95<L>  |  66.0<H>  ----------------------------<  153<H>  4.2   |  35.0<H>  |  1.21    Ca    8.9      14 Dec 2018 08:27            RADIOLOGY & ADDITIONAL TESTS:

## 2018-12-14 NOTE — PROGRESS NOTE ADULT - ASSESSMENT
KEITH: likely renal hypoperfusion due to diuretics  Cr 1.2 improved electrolytes ok  CHF (EF= 70%; mild pulm HTN)==> diastolic dysfunction  Renal sono repeated and unremarkable  CHF now compensated  - would continue diuretics to keep azotemic  - daily weights    Will follow

## 2018-12-14 NOTE — PROGRESS NOTE ADULT - ASSESSMENT
64 y/o female with CAD s/p CABG and PCI, PAD s/p balloon angioplasty, diabetes mellitus, hypertension, hyperlipidemia admitted with complaints of worsening exertional shortness of breath. She was evaluated by cardiology and started on IV diuretics, for acute on chronic diastolic CHF. Stress test was done, showed inferior ischemia, medical management advised. She was noted to have CO2 narcosis, placed on NIV. Cardiology planned for right heart catheterisation given RV dysfunction. Dobutamine was given for RV dysfunction. She was noted to have contrast induced nephropathy, diuretics were held. Nephrology consulted, Losartan was held. IR was consulted for pleural effusion, underwent right thoracentesis. RRT was called, for lethargy, related to hypercapnia, improved with NIV. Diuretics were reinstated given worsening symptoms. Her renal function improved. Venofer was added for anemia. PT consulted, advised SINGH. Given improvement in respiratory status, she was started on oral diuretics. Her CXR showed opacification of right lung. Metolazone was added by nephrology for fluid overload. IR consulted for right thoracentesis, she underwent thoracentesis on 12/12, 1500 cc of fluid was removed. Her renal function normalized.

## 2018-12-14 NOTE — PROGRESS NOTE ADULT - ASSESSMENT
Assess    OHS/KYREE/Diastolic HF with hypercarbia and recurrent effusion  CAD  Restrictive pulmonary impairment with chronic hypercarbic respiratory failure with progressive impairment      Plan    Cardiac regiment  Nocturnal Bipap  02 as needed  Pleurex drainage if effusion continues to recur  NIV on discharge to lower PC02 and to prevent recurrent hospitalization  Bipap was considered but rejected due to the progressive nature of the patient's condition

## 2018-12-14 NOTE — PROGRESS NOTE ADULT - SUBJECTIVE AND OBJECTIVE BOX
PULMONARY PROGRESS NOTE      RANJIT [id number]    Patient is a 63y old  Female who presents with a chief complaint of Shortness of breath (14 Dec 2018 11:37)  Acute on chronic diastolic HF  KEITH  DM  HTN  CAD  OHS/KYREE with hypercarbic respiratory failure  Last IR drainage of pleural effusion on     INTERVAL HPI/OVERNIGHT EVENTS:  Comfortable    MEDICATIONS  (STANDING):  aspirin  chewable 81 milliGRAM(s) Oral daily  atorvastatin 40 milliGRAM(s) Oral at bedtime  carvedilol 25 milliGRAM(s) Oral every 12 hours  clopidogrel Tablet 75 milliGRAM(s) Oral daily  dextrose 5%. 1000 milliLiter(s) (50 mL/Hr) IV Continuous <Continuous>  dextrose 50% Injectable 12.5 Gram(s) IV Push once  dextrose 50% Injectable 25 Gram(s) IV Push once  dextrose 50% Injectable 25 Gram(s) IV Push once  docusate sodium 100 milliGRAM(s) Oral two times a day  DULoxetine 60 milliGRAM(s) Oral daily  epoetin sara Injectable 25439 Unit(s) SubCutaneous <User Schedule>  gabapentin 100 milliGRAM(s) Oral three times a day  insulin glargine Injectable (LANTUS) 7 Unit(s) SubCutaneous at bedtime  insulin lispro (HumaLOG) corrective regimen sliding scale   SubCutaneous three times a day before meals  isosorbide   mononitrate ER Tablet (IMDUR) 60 milliGRAM(s) Oral daily  metolazone 2.5 milliGRAM(s) Oral daily  pantoprazole    Tablet 40 milliGRAM(s) Oral before breakfast  torsemide 40 milliGRAM(s) Oral two times a day      MEDICATIONS  (PRN):  acetaminophen   Tablet .. 650 milliGRAM(s) Oral every 6 hours PRN Temp greater or equal to 38C (100.4F), Mild Pain (1 - 3)  bisacodyl Suppository 10 milliGRAM(s) Rectal daily PRN Constipation  dextrose 40% Gel 15 Gram(s) Oral once PRN Blood Glucose LESS THAN 70 milliGRAM(s)/deciliter  glucagon  Injectable 1 milliGRAM(s) IntraMuscular once PRN Glucose LESS THAN 70 milligrams/deciliter  lactulose Syrup 10 Gram(s) Oral three times a day PRN colnstipaiton      Allergies    Accupril (Other)    Intolerances        PAST MEDICAL & SURGICAL HISTORY:  Herniated disc, cervical  PAD (peripheral artery disease)  Legally blind  History of peripheral vascular disease  History of retinal detachment  History of CEA (carotid endarterectomy): Right  Hyperlipidemia  Glaucoma  Hypertension  Diabetes  S/P CABG x 1  After cataract  Uveitic glaucoma of both eyes  Detached retina  Atherosclerosis of right carotid artery   delivery delivered      SOCIAL HISTORY  Smoking History:       REVIEW OF SYSTEMS:    CONSTITUTIONAL:  No distress    HEENT:  Eyes:  No diplopia or blurred vision. ENT:  No earache, sore throat or runny nose.    CARDIOVASCULAR:  No pressure, squeezing, tightness, heaviness or aching about the chest; no palpitations.    RESPIRATORY:  No cough, shortness of breath, PND or orthopnea. Mild SOBOE    GASTROINTESTINAL:  No nausea, vomiting or diarrhea.    GENITOURINARY:  No dysuria, frequency or urgency.    NEUROLOGIC:  No paresthesias, fasciculations, seizures or weakness.    PSYCHIATRIC:  No disorder of thought or mood.    Vital Signs Last 24 Hrs  T(C): 36.9 (14 Dec 2018 07:43), Max: 36.9 (14 Dec 2018 07:43)  T(F): 98.4 (14 Dec 2018 07:43), Max: 98.4 (14 Dec 2018 07:43)  HR: 58 (14 Dec 2018 07:43) (58 - 66)  BP: 123/62 (14 Dec 2018 07:43) (123/62 - 131/72)  BP(mean): --  RR: 18 (14 Dec 2018 07:43) (18 - 18)  SpO2: 97% (14 Dec 2018 07:43) (97% - 100%)    PHYSICAL EXAMINATION:    GENERAL: The patient is awake and alert in no apparent distress.     HEENT: Head is normocephalic and atraumatic. Extraocular muscles are intact. Mucous membranes are moist.    NECK: Supple.    LUNGS: Clear to auscultation without wheezing, rales or rhonchi; respirations unlabored    HEART: Regular rate and rhythm without murmur.    ABDOMEN: Soft, nontender, and nondistended.      EXTREMITIES: Without any cyanosis, clubbing, rash, lesions or edema.    NEUROLOGIC: Grossly intact.    LABS:                        8.1    6.6   )-----------( 244      ( 14 Dec 2018 08:27 )             23.6     12-14    140  |  95<L>  |  66.0<H>  ----------------------------<  153<H>  4.2   |  35.0<H>  |  1.21    Ca    8.9      14 Dec 2018 08:27        RADIOLOGY & ADDITIONAL STUDIES:    CXR on   IMPRESSION:  Interval marked improvement in aeration of the right lung field with   persistent small right pleural effusion and right basilar atelectasis.   Unchanged pulmonary vascular congestion. No appreciable pneumothorax.    Cardiomediastinal contours are unchanged. Status post median sternotomy.    CHIARA BREWER M.D., ATTENDING RADIOLOGIST  This document has been electronically signed. Dec 12 2018  1:19PM      Echo on    1. Left ventricular ejection fraction, by visual estimation, is 65 to   70%.   2. Normal global left ventricular systolic function.   3. There is mild concentric left ventricular hypertrophy.   4. Moderately enlarged right ventricle.   5. Moderately reduced RV systolic function.   6. There is no evidence of pericardial effusion.   7. Moderate mitral valve regurgitation.   8. Thickening of the anterior and posterior mitral valve leaflets.   9. Moderate tricuspid regurgitation.  10. Sclerotic aortic valve with normal opening.  11. Trace pulmonic valve regurgitation.  12. Estimated pulmonary artery systolic pressure is 45.7 mmHg assuming a   right atrial pressure of 8 mmHg, which is consistent with mild pulmonary   hypertension.    [id number] Phoenix Alvarado MD, Electronically signed on 2018 at 8:26:51   PM

## 2018-12-14 NOTE — PROGRESS NOTE ADULT - SUBJECTIVE AND OBJECTIVE BOX
NEPHROLOGY INTERVAL HPI/OVERNIGHT EVENTS:    Examined earlier    MEDICATIONS  (STANDING):  aspirin  chewable 81 milliGRAM(s) Oral daily  atorvastatin 40 milliGRAM(s) Oral at bedtime  carvedilol 25 milliGRAM(s) Oral every 12 hours  clopidogrel Tablet 75 milliGRAM(s) Oral daily  dextrose 5%. 1000 milliLiter(s) (50 mL/Hr) IV Continuous <Continuous>  dextrose 50% Injectable 12.5 Gram(s) IV Push once  dextrose 50% Injectable 25 Gram(s) IV Push once  dextrose 50% Injectable 25 Gram(s) IV Push once  docusate sodium 100 milliGRAM(s) Oral two times a day  DULoxetine 60 milliGRAM(s) Oral daily  epoetin sara Injectable 71113 Unit(s) SubCutaneous <User Schedule>  gabapentin 100 milliGRAM(s) Oral three times a day  insulin glargine Injectable (LANTUS) 7 Unit(s) SubCutaneous at bedtime  insulin lispro (HumaLOG) corrective regimen sliding scale   SubCutaneous three times a day before meals  isosorbide   mononitrate ER Tablet (IMDUR) 60 milliGRAM(s) Oral daily  metolazone 2.5 milliGRAM(s) Oral daily  pantoprazole    Tablet 40 milliGRAM(s) Oral before breakfast  torsemide 40 milliGRAM(s) Oral two times a day    MEDICATIONS  (PRN):  acetaminophen   Tablet .. 650 milliGRAM(s) Oral every 6 hours PRN Temp greater or equal to 38C (100.4F), Mild Pain (1 - 3)  bisacodyl Suppository 10 milliGRAM(s) Rectal daily PRN Constipation  dextrose 40% Gel 15 Gram(s) Oral once PRN Blood Glucose LESS THAN 70 milliGRAM(s)/deciliter  glucagon  Injectable 1 milliGRAM(s) IntraMuscular once PRN Glucose LESS THAN 70 milligrams/deciliter  lactulose Syrup 10 Gram(s) Oral three times a day PRN colnstipaiton      Allergies    Accupril (Other)    Intolerances        Vital Signs Last 24 Hrs  T(C): 36.7 (14 Dec 2018 16:40), Max: 36.9 (14 Dec 2018 07:43)  T(F): 98 (14 Dec 2018 16:40), Max: 98.4 (14 Dec 2018 07:43)  HR: 66 (14 Dec 2018 16:40) (58 - 66)  BP: 120/61 (14 Dec 2018 16:40) (120/61 - 131/72)  BP(mean): --  RR: 18 (14 Dec 2018 16:40) (18 - 18)  SpO2: 99% (14 Dec 2018 09:12) (97% - 100%)  Daily     Daily     PHYSICAL EXAM:  GENERAL: NAD  HEENT: same  NECK: Supple, +JVD  NERVOUS SYSTEM:  Alert & Oriented in bed  CHEST/LUNG: Diminished BS right side, no wheezes  HEART: Regular rate and rhythm; no rub  ABDOMEN: Soft, Nontender,+ distention; +BS; + flank edema  EXTREMITIES:   leg edema better   neg        LABS:                        8.1    6.6   )-----------( 244      ( 14 Dec 2018 08:27 )             23.6     12-14    140  |  95<L>  |  66.0<H>  ----------------------------<  153<H>  4.2   |  35.0<H>  |  1.21    Ca    8.9      14 Dec 2018 08:27                  RADIOLOGY & ADDITIONAL TESTS:

## 2018-12-15 DIAGNOSIS — J90 PLEURAL EFFUSION, NOT ELSEWHERE CLASSIFIED: ICD-10-CM

## 2018-12-15 DIAGNOSIS — D64.9 ANEMIA, UNSPECIFIED: ICD-10-CM

## 2018-12-15 LAB
GLUCOSE BLDC GLUCOMTR-MCNC: 198 MG/DL — HIGH (ref 70–99)
GLUCOSE BLDC GLUCOMTR-MCNC: 218 MG/DL — HIGH (ref 70–99)
GLUCOSE BLDC GLUCOMTR-MCNC: 224 MG/DL — HIGH (ref 70–99)

## 2018-12-15 PROCEDURE — 99232 SBSQ HOSP IP/OBS MODERATE 35: CPT

## 2018-12-15 PROCEDURE — 99233 SBSQ HOSP IP/OBS HIGH 50: CPT

## 2018-12-15 PROCEDURE — 93971 EXTREMITY STUDY: CPT | Mod: 26,LT

## 2018-12-15 RX ADMIN — DULOXETINE HYDROCHLORIDE 60 MILLIGRAM(S): 30 CAPSULE, DELAYED RELEASE ORAL at 12:03

## 2018-12-15 RX ADMIN — Medication 2: at 17:31

## 2018-12-15 RX ADMIN — CLOPIDOGREL BISULFATE 75 MILLIGRAM(S): 75 TABLET, FILM COATED ORAL at 12:03

## 2018-12-15 RX ADMIN — Medication 100 MILLIGRAM(S): at 05:19

## 2018-12-15 RX ADMIN — Medication 650 MILLIGRAM(S): at 12:05

## 2018-12-15 RX ADMIN — ERYTHROPOIETIN 40000 UNIT(S): 10000 INJECTION, SOLUTION INTRAVENOUS; SUBCUTANEOUS at 15:30

## 2018-12-15 RX ADMIN — Medication 2: at 11:57

## 2018-12-15 RX ADMIN — PANTOPRAZOLE SODIUM 40 MILLIGRAM(S): 20 TABLET, DELAYED RELEASE ORAL at 05:46

## 2018-12-15 RX ADMIN — Medication 100 MILLIGRAM(S): at 17:32

## 2018-12-15 RX ADMIN — CARVEDILOL PHOSPHATE 25 MILLIGRAM(S): 80 CAPSULE, EXTENDED RELEASE ORAL at 05:19

## 2018-12-15 RX ADMIN — ISOSORBIDE MONONITRATE 60 MILLIGRAM(S): 60 TABLET, EXTENDED RELEASE ORAL at 12:03

## 2018-12-15 RX ADMIN — Medication 40 MILLIGRAM(S): at 17:32

## 2018-12-15 RX ADMIN — GABAPENTIN 100 MILLIGRAM(S): 400 CAPSULE ORAL at 15:29

## 2018-12-15 RX ADMIN — Medication 40 MILLIGRAM(S): at 05:19

## 2018-12-15 RX ADMIN — GABAPENTIN 100 MILLIGRAM(S): 400 CAPSULE ORAL at 05:19

## 2018-12-15 RX ADMIN — CARVEDILOL PHOSPHATE 25 MILLIGRAM(S): 80 CAPSULE, EXTENDED RELEASE ORAL at 17:32

## 2018-12-15 RX ADMIN — Medication 650 MILLIGRAM(S): at 12:40

## 2018-12-15 RX ADMIN — Medication 1: at 07:52

## 2018-12-15 RX ADMIN — Medication 81 MILLIGRAM(S): at 12:04

## 2018-12-15 NOTE — PROGRESS NOTE ADULT - PROBLEM SELECTOR PLAN 8
Patient with normal iron and TIBC, ferritin, low iron% saturation, anemia looks like multifactorial could be related to CKD, has been started on epo, some component of anemia of chronic disease as well, will continue to monitor CBC.

## 2018-12-15 NOTE — PROGRESS NOTE ADULT - PROBLEM SELECTOR PLAN 2
torsemide 40 milliGRAM(s) Oral two times a day (was on 20mg bid prior to hospital)  metolazone qd  Low na diet   Fluid restrictions  I< o  diursing well  still 6 pounds heavier than admission weight torsemide 40 milliGRAM(s) Oral two times a day (was on 20mg bid prior to hospital)  metolazone qd  would continue current dose of diuresis  patient is diuresing well  Low na diet   Fluid restrictions  I< o  diursing well  still 6 pounds heavier than admission weight

## 2018-12-15 NOTE — PROGRESS NOTE ADULT - ASSESSMENT
62 y/o female with PMHx of CAD s/p CABG x1 Stent placement x2,  PAD s/p balloon angioplasty, DM2, HTN, HLD came to the ED complaining of worsening dyspnea with minimal exertion and increasing abdominal girth over the past 2 weeks.  Echo with ef 65%, mild lvh, mod reduced rv function rsvp (mild pulm htn) presents with Cook, leg edema  anasarca  Echo ef 65%  right sys pressure 38, stress test c/w inferior ischemic with review of cath poorly vascularized region so no intervention, and right heart cath revealing right and left heart failure.  Patient was diuresed and developed atn.  Diuretics held patient developed hypercapnic resp failure, right pleural effusion s/p thoracentsis  Had repeat thoracentesis today 62 y/o female with PMHx of CAD s/p CABG x1 Stent placement x2,  PAD s/p balloon angioplasty, DM2, HTN, HLD came to the ED complaining of worsening dyspnea with minimal exertion and increasing abdominal girth over the past 2 weeks.  Echo with ef 65%, mild lvh, mod reduced rv function rsvp (mild pulm htn) presents with Cook, leg edema  anasarca  Echo ef 65%  right sys pressure 38, stress test c/w inferior ischemic with review of cath poorly vascularized region so no intervention, and right heart cath revealing right and left heart failure.  Patient was diuresed and developed atn.  Diuretics held patient developed hypercapnic resp failure, right pleural effusion s/p thoracentsis

## 2018-12-15 NOTE — PROGRESS NOTE ADULT - SUBJECTIVE AND OBJECTIVE BOX
PULMONARY PROGRESS NOTE      RANJIT ROCHA-[id number]    Patient is a 63y old  Female who presents with a chief complaint of Shortness of breath (14 Dec 2018 11:37)  Acute on chronic diastolic HF  KEITH  DM  HTN  CAD  OHS/KYREE with hypercarbic respiratory failure  Last IR drainage of pleural effusion on     INTERVAL HPI/OVERNIGHT EVENTS:  Comfortable    MEDICATIONS  (STANDING):  aspirin  chewable 81 milliGRAM(s) Oral daily  atorvastatin 40 milliGRAM(s) Oral at bedtime  carvedilol 25 milliGRAM(s) Oral every 12 hours  clopidogrel Tablet 75 milliGRAM(s) Oral daily  dextrose 5%. 1000 milliLiter(s) (50 mL/Hr) IV Continuous <Continuous>  dextrose 50% Injectable 12.5 Gram(s) IV Push once  dextrose 50% Injectable 25 Gram(s) IV Push once  dextrose 50% Injectable 25 Gram(s) IV Push once  docusate sodium 100 milliGRAM(s) Oral two times a day  DULoxetine 60 milliGRAM(s) Oral daily  epoetin sara Injectable 13957 Unit(s) SubCutaneous <User Schedule>  gabapentin 100 milliGRAM(s) Oral three times a day  insulin glargine Injectable (LANTUS) 7 Unit(s) SubCutaneous at bedtime  insulin lispro (HumaLOG) corrective regimen sliding scale   SubCutaneous three times a day before meals  isosorbide   mononitrate ER Tablet (IMDUR) 60 milliGRAM(s) Oral daily  metolazone 2.5 milliGRAM(s) Oral daily  pantoprazole    Tablet 40 milliGRAM(s) Oral before breakfast  torsemide 40 milliGRAM(s) Oral two times a day      MEDICATIONS  (PRN):  acetaminophen   Tablet .. 650 milliGRAM(s) Oral every 6 hours PRN Temp greater or equal to 38C (100.4F), Mild Pain (1 - 3)  bisacodyl Suppository 10 milliGRAM(s) Rectal daily PRN Constipation  dextrose 40% Gel 15 Gram(s) Oral once PRN Blood Glucose LESS THAN 70 milliGRAM(s)/deciliter  glucagon  Injectable 1 milliGRAM(s) IntraMuscular once PRN Glucose LESS THAN 70 milligrams/deciliter  lactulose Syrup 10 Gram(s) Oral three times a day PRN colnstipaiton      Allergies    Accupril (Other)    Intolerances        PAST MEDICAL & SURGICAL HISTORY:  Herniated disc, cervical  PAD (peripheral artery disease)  Legally blind  History of peripheral vascular disease  History of retinal detachment  History of CEA (carotid endarterectomy): Right  Hyperlipidemia  Glaucoma  Hypertension  Diabetes  S/P CABG x 1  After cataract  Uveitic glaucoma of both eyes  Detached retina  Atherosclerosis of right carotid artery   delivery delivered      SOCIAL HISTORY  Smoking History:       REVIEW OF SYSTEMS:    CONSTITUTIONAL:  No distress    HEENT:  Eyes:  No diplopia or blurred vision. ENT:  No earache, sore throat or runny nose.    CARDIOVASCULAR:  No pressure, squeezing, tightness, heaviness or aching about the chest; no palpitations.    RESPIRATORY:  No cough, shortness of breath, PND or orthopnea. Mild SOBOE    GASTROINTESTINAL:  No nausea, vomiting or diarrhea.    GENITOURINARY:  No dysuria, frequency or urgency.    NEUROLOGIC:  No paresthesias, fasciculations, seizures or weakness.    PSYCHIATRIC:  No disorder of thought or mood.    Vital Signs Last 24 Hrs  T(C): 36.9 (14 Dec 2018 07:43), Max: 36.9 (14 Dec 2018 07:43)  T(F): 98.4 (14 Dec 2018 07:43), Max: 98.4 (14 Dec 2018 07:43)  HR: 58 (14 Dec 2018 07:43) (58 - 66)  BP: 123/62 (14 Dec 2018 07:43) (123/62 - 131/72)  BP(mean): --  RR: 18 (14 Dec 2018 07:43) (18 - 18)  SpO2: 97% (14 Dec 2018 07:43) (97% - 100%)    PHYSICAL EXAMINATION:    GENERAL: The patient is awake and alert in no apparent distress.     HEENT: Head is normocephalic and atraumatic. Extraocular muscles are intact. Mucous membranes are moist.    NECK: Supple.    LUNGS: Clear to auscultation without wheezing, rales or rhonchi; respirations unlabored    HEART: Regular rate and rhythm without murmur.    ABDOMEN: Soft, nontender, and nondistended.      EXTREMITIES: Without any cyanosis, clubbing, rash, lesions or edema.    NEUROLOGIC: Grossly intact.    LABS:                        8.1    6.6   )-----------( 244      ( 14 Dec 2018 08:27 )             23.6     12-14    140  |  95<L>  |  66.0<H>  ----------------------------<  153<H>  4.2   |  35.0<H>  |  1.21    Ca    8.9      14 Dec 2018 08:27        RADIOLOGY & ADDITIONAL STUDIES:    CXR on   IMPRESSION:  Interval marked improvement in aeration of the right lung field with   persistent small right pleural effusion and right basilar atelectasis.   Unchanged pulmonary vascular congestion. No appreciable pneumothorax.    Cardiomediastinal contours are unchanged. Status post median sternotomy.    CHIARA BREWER M.D., ATTENDING RADIOLOGIST  This document has been electronically signed. Dec 12 2018  1:19PM      Echo on    1. Left ventricular ejection fraction, by visual estimation, is 65 to   70%.   2. Normal global left ventricular systolic function.   3. There is mild concentric left ventricular hypertrophy.   4. Moderately enlarged right ventricle.   5. Moderately reduced RV systolic function.   6. There is no evidence of pericardial effusion.   7. Moderate mitral valve regurgitation.   8. Thickening of the anterior and posterior mitral valve leaflets.   9. Moderate tricuspid regurgitation.  10. Sclerotic aortic valve with normal opening.  11. Trace pulmonic valve regurgitation.  12. Estimated pulmonary artery systolic pressure is 45.7 mmHg assuming a   right atrial pressure of 8 mmHg, which is consistent with mild pulmonary   hypertension.    [id number] Phoenix Alvarado MD, Electronically signed on 2018 at 8:26:51   PM

## 2018-12-15 NOTE — PROGRESS NOTE ADULT - PROBLEM SELECTOR PLAN 1
aspirin  chewable 81 milliGRAM(s) Oral daily  atorvastatin 40 milliGRAM(s) Oral at bedtime  carvedilol 25 milliGRAM(s) Oral every 12 hours  clopidogrel Tablet 75 milliGRAM(s) Oral daily  torsemide 40 milliGRAM(s) Oral two times a day

## 2018-12-15 NOTE — PROGRESS NOTE ADULT - SUBJECTIVE AND OBJECTIVE BOX
KUN ROCHA    [id number]    63y      Female    Patient is a 63y old  Female who presents with a chief complaint of Shortness of breath (15 Dec 2018 11:33)      INTERVAL HPI/OVERNIGHT EVENTS:    Patient SOB is better then before still require supplemental oxygen, denies fever, chills, chest pain, nausea, vomiting, she is complaining of left leg pain.     REVIEW OF SYSTEMS:    CONSTITUTIONAL: No fever, has fatigue  RESPIRATORY: No cough, some shortness of breath  CARDIOVASCULAR: No chest pain, palpitations  GASTROINTESTINAL: No abdominal, No nausea, vomiting  NEUROLOGICAL: No headaches,  loss of strength.  MISCELLANEOUS: No joint swelling or pain       Vital Signs Last 24 Hrs  T(C): 36.7 (14 Dec 2018 23:44), Max: 36.7 (14 Dec 2018 16:40)  T(F): 98.1 (14 Dec 2018 23:44), Max: 98.1 (14 Dec 2018 23:44)  HR: 69 (15 Dec 2018 08:11) (63 - 69)  BP: 129/63 (14 Dec 2018 23:44) (120/61 - 129/63)  RR: 18 (14 Dec 2018 23:44) (18 - 18)  SpO2: 99% (15 Dec 2018 08:11) (95% - 100%)    PHYSICAL EXAM:    GENERAL: Middle age female looking comfortable.   HEENT: Right eye Corneal scaring and right eye blindness                                                                             NECK: soft, Supple, No JVD,   CHEST/LUNG: Decrease air entry and some basal crackles bilaterally; No wheezing  HEART: S1S2+, Regular rate and rhythm; No murmurs  ABDOMEN: Soft, Nontender, Nondistended; Bowel sounds present  EXTREMITIES:  1+ Peripheral Pulses, +1 edema b/l, left leg has previous surgical scar.   SKIN: No rashes or lesions  NEURO: AAOX3, no focal deficits, no motor r sensory loss  PSYCH: normal mood      LABS:                        8.1    6.6   )-----------( 244      ( 14 Dec 2018 08:27 )             23.6     12-14    140  |  95<L>  |  66.0<H>  ----------------------------<  153<H>  4.2   |  35.0<H>  |  1.21    Ca    8.9      14 Dec 2018 08:27              I&O's Summary    14 Dec 2018 07:01  -  15 Dec 2018 07:00  --------------------------------------------------------  IN: 360 mL / OUT: 900 mL / NET: -540 mL    15 Dec 2018 07:01  -  15 Dec 2018 11:44  --------------------------------------------------------  IN: 0 mL / OUT: 475 mL / NET: -475 mL        MEDICATIONS  (STANDING):  aspirin  chewable 81 milliGRAM(s) Oral daily  atorvastatin 40 milliGRAM(s) Oral at bedtime  carvedilol 25 milliGRAM(s) Oral every 12 hours  clopidogrel Tablet 75 milliGRAM(s) Oral daily  dextrose 5%. 1000 milliLiter(s) (50 mL/Hr) IV Continuous <Continuous>  dextrose 50% Injectable 12.5 Gram(s) IV Push once  dextrose 50% Injectable 25 Gram(s) IV Push once  dextrose 50% Injectable 25 Gram(s) IV Push once  docusate sodium 100 milliGRAM(s) Oral two times a day  DULoxetine 60 milliGRAM(s) Oral daily  epoetin sara Injectable [id number] Unit(s) SubCutaneous <User Schedule>  gabapentin 100 milliGRAM(s) Oral three times a day  insulin glargine Injectable (LANTUS) 7 Unit(s) SubCutaneous at bedtime  insulin lispro (HumaLOG) corrective regimen sliding scale   SubCutaneous three times a day before meals  isosorbide   mononitrate ER Tablet (IMDUR) 60 milliGRAM(s) Oral daily  metolazone 2.5 milliGRAM(s) Oral daily  pantoprazole    Tablet 40 milliGRAM(s) Oral before breakfast  torsemide 40 milliGRAM(s) Oral two times a day    MEDICATIONS  (PRN):  acetaminophen   Tablet .. 650 milliGRAM(s) Oral every 6 hours PRN Temp greater or equal to 38C (100.4F), Mild Pain (1 - 3)  bisacodyl Suppository 10 milliGRAM(s) Rectal daily PRN Constipation  dextrose 40% Gel 15 Gram(s) Oral once PRN Blood Glucose LESS THAN 70 milliGRAM(s)/deciliter  glucagon  Injectable 1 milliGRAM(s) IntraMuscular once PRN Glucose LESS THAN 70 milligrams/deciliter  lactulose Syrup 10 Gram(s) Oral three times a day PRN colnstipaiton

## 2018-12-15 NOTE — PROGRESS NOTE ADULT - SUBJECTIVE AND OBJECTIVE BOX
[place] [facility]  [facility]/[place]/[facility]  [address] [place] [address]  P   F     REASON FOR VISIT:  Follow up on chf  UPDATE:    MEDICATIONS  (STANDING):  isosorbide   mononitrate ER Tablet (IMDUR) 60 milliGRAM(s) Oral daily  metolazone 2.5 milliGRAM(s) Oral daily  aspirin  chewable 81 milliGRAM(s) Oral daily  atorvastatin 40 milliGRAM(s) Oral at bedtime  carvedilol 25 milliGRAM(s) Oral every 12 hours  clopidogrel Tablet 75 milliGRAM(s) Oral daily  torsemide 40 milliGRAM(s) Oral two times a day    docusate sodium 100 milliGRAM(s) Oral two times a day  DULoxetine 60 milliGRAM(s) Oral daily  epoetin sara Injectable 83569 Unit(s) SubCutaneous <User Schedule>  gabapentin 100 milliGRAM(s) Oral three times a day  insulin glargine Injectable (LANTUS) 7 Unit(s) SubCutaneous at bedtime  insulin lispro (HumaLOG) corrective regimen sliding scale   SubCutaneous three times a day before meals  pantoprazole    Tablet 40 milliGRAM(s) Oral before breakfast    Weight 11/25/2018 214  Now 220      Vital Signs Last 24 Hrs  T(C): 36.7 (15 Dec 2018 15:10), Max: 36.7 (14 Dec 2018 16:40)  T(F): 98.1 (15 Dec 2018 15:10), Max: 98.1 (14 Dec 2018 23:44)  HR: 67 (15 Dec 2018 15:10) (63 - 69)  BP: 131/62 (15 Dec 2018 15:10) (120/61 - 131/62)  BP(mean): --  RR: 18 (15 Dec 2018 15:10) (18 - 18)  SpO2: 97% (15 Dec 2018 15:10) (95% - 100%)    HEENT  CV  RESP  GI  EXT   NEURO      LABS:  CBC Full  -  ( 14 Dec 2018 08:27 )  WBC Count : 6.6 K/uL  Hemoglobin : 8.1 g/dL  Hematocrit : 23.6 %  Platelet Count - Automated : 244 K/uL      12-14    140  |  95<L>  |  66.0<H>  ----------------------------<  153<H>  4.2   |  35.0<H>  |  1.21    Ca    8.9      14 Dec 2018 08:27    Summary:   1. Left ventricular ejection fraction, by visual estimation, is 65 to   70%.   2. Normal global left ventricular systolic function.   3. There is mild concentric left ventricular hypertrophy.   4. Moderately enlarged right ventricle.   5. Moderately reduced RV systolic function.   6. There is no evidence of pericardial effusion.   7. Moderate mitral valve regurgitation.   8. Thickening of the anterior and posterior mitral valve leaflets.   9. Moderate tricuspid regurgitation.  10. Sclerotic aortic valve with normal opening.  11. Trace pulmonic valve regurgitation.  12. Estimated pulmonary artery systolic pressure is 45.7 mmHg assuming a   right atrial pressure of 8 mmHg, which is consistent with mild pulmonary   hypertension.    NUCLEAR FINDINGS:  Review of raw images demonstrated increased gut uptake.  Dense breast tissue noted anterolaterally.  LV cavity size  is normal.  There janae a medium sized mild defect involving  the basal to distal inferior segments on stress imaging.  Rest imaging demonstrated normalization of the inferior  defect.  Gated SPECT imaging demonstrated normal wall  motion with a post stress calculated LVEF of 67%.  ------------------------------------------------------------------------    IMPRESSIONS:    Single vessel, inferior ischemia involving the RCA  territory is present.  Intermediate risk scan.  Normal wall motion, LVEF 67% [place] [facility]  [facility]/[place]/[facility]ACT[facility]  [address] [place] [address]  P   F     REASON FOR VISIT:  Follow up on chf  UPDATE:    MEDICATIONS  (STANDING):  isosorbide   mononitrate ER Tablet (IMDUR) 60 milliGRAM(s) Oral daily  metolazone 2.5 milliGRAM(s) Oral daily  aspirin  chewable 81 milliGRAM(s) Oral daily  atorvastatin 40 milliGRAM(s) Oral at bedtime  carvedilol 25 milliGRAM(s) Oral every 12 hours  clopidogrel Tablet 75 milliGRAM(s) Oral daily  torsemide 40 milliGRAM(s) Oral two times a day    docusate sodium 100 milliGRAM(s) Oral two times a day  DULoxetine 60 milliGRAM(s) Oral daily  epoetin sara Injectable 85206 Unit(s) SubCutaneous <User Schedule>  gabapentin 100 milliGRAM(s) Oral three times a day  insulin glargine Injectable (LANTUS) 7 Unit(s) SubCutaneous at bedtime  insulin lispro (HumaLOG) corrective regimen sliding scale   SubCutaneous three times a day before meals  pantoprazole    Tablet 40 milliGRAM(s) Oral before breakfast    Weight 11/25/2018 214  Now 220      Vital Signs Last 24 Hrs  T(C): 36.7 (15 Dec 2018 15:10), Max: 36.7 (14 Dec 2018 16:40)  T(F): 98.1 (15 Dec 2018 15:10), Max: 98.1 (14 Dec 2018 23:44)  HR: 67 (15 Dec 2018 15:10) (63 - 69)  BP: 131/62 (15 Dec 2018 15:10) (120/61 - 131/62)  BP(mean): --  RR: 18 (15 Dec 2018 15:10) (18 - 18)  SpO2: 97% (15 Dec 2018 15:10) (95% - 100%)    HEENT  CV  RESP  GI  EXT   NEURO      LABS:  CBC Full  -  ( 14 Dec 2018 08:27 )  WBC Count : 6.6 K/uL  Hemoglobin : 8.1 g/dL  Hematocrit : 23.6 %  Platelet Count - Automated : 244 K/uL      12-14    140  |  95<L>  |  66.0<H>  ----------------------------<  153<H>  4.2   |  35.0<H>  |  1.21    Ca    8.9      14 Dec 2018 08:27    Summary:   1. Left ventricular ejection fraction, by visual estimation, is 65 to   70%.   2. Normal global left ventricular systolic function.   3. There is mild concentric left ventricular hypertrophy.   4. Moderately enlarged right ventricle.   5. Moderately reduced RV systolic function.   6. There is no evidence of pericardial effusion.   7. Moderate mitral valve regurgitation.   8. Thickening of the anterior and posterior mitral valve leaflets.   9. Moderate tricuspid regurgitation.  10. Sclerotic aortic valve with normal opening.  11. Trace pulmonic valve regurgitation.  12. Estimated pulmonary artery systolic pressure is 45.7 mmHg assuming a   right atrial pressure of 8 mmHg, which is consistent with mild pulmonary   hypertension.    NUCLEAR FINDINGS:  Review of raw images demonstrated increased gut uptake.  Dense breast tissue noted anterolaterally.  LV cavity size  is normal.  There janae a medium sized mild defect involving  the basal to distal inferior segments on stress imaging.  Rest imaging demonstrated normalization of the inferior  defect.  Gated SPECT imaging demonstrated normal wall  motion with a post stress calculated LVEF of 67%.  ------------------------------------------------------------------------    IMPRESSIONS:    Single vessel, inferior ischemia involving the RCA  territory is present.  Intermediate risk scan.  Normal wall motion, LVEF 67%    right heart cath    Pressures:  -- Pulmonary Artery (S/D/M): 74/32/46  Pressures:  -- Pulmonary Capillary Wedge: 38/41/30  Pressures:  -- Right Atrium (a/v/M): 30/36/26  Pressures:  -- Right Ventricle (s/edp): 79/28/-- [place] [facility]  [facility] PRACT[facility]  [address] [place] [address]  P   F     REASON FOR VISIT:  Follow up on chf  UPDATE:  Left lower sonogram negative for dvt  patient still with edema     Denies any cp sob or palp  Resp  no cough no dyspnea  Gi no abd pain    MEDICATIONS  (STANDING):  isosorbide   mononitrate ER Tablet (IMDUR) 60 milliGRAM(s) Oral daily  metolazone 2.5 milliGRAM(s) Oral daily  aspirin  chewable 81 milliGRAM(s) Oral daily  atorvastatin 40 milliGRAM(s) Oral at bedtime  carvedilol 25 milliGRAM(s) Oral every 12 hours  clopidogrel Tablet 75 milliGRAM(s) Oral daily  torsemide 40 milliGRAM(s) Oral two times a day    docusate sodium 100 milliGRAM(s) Oral two times a day  DULoxetine 60 milliGRAM(s) Oral daily  epoetin sara Injectable 11858 Unit(s) SubCutaneous <User Schedule>  gabapentin 100 milliGRAM(s) Oral three times a day  insulin glargine Injectable (LANTUS) 7 Unit(s) SubCutaneous at bedtime  insulin lispro (HumaLOG) corrective regimen sliding scale   SubCutaneous three times a day before meals  pantoprazole    Tablet 40 milliGRAM(s) Oral before breakfast    Weight 11/25/2018 214  Now 220      Vital Signs Last 24 Hrs  T(C): 36.7 (15 Dec 2018 15:10), Max: 36.7 (14 Dec 2018 16:40)  T(F): 98.1 (15 Dec 2018 15:10), Max: 98.1 (14 Dec 2018 23:44)  HR: 67 (15 Dec 2018 15:10) (63 - 69)  BP: 131/62 (15 Dec 2018 15:10) (120/61 - 131/62)  BP(mean): --  RR: 18 (15 Dec 2018 15:10) (18 - 18)  SpO2: 97% (15 Dec 2018 15:10) (95% - 100%)    HEENT  CV  RESP  GI  EXT   NEURO      LABS:  CBC Full  -  ( 14 Dec 2018 08:27 )  WBC Count : 6.6 K/uL  Hemoglobin : 8.1 g/dL  Hematocrit : 23.6 %  Platelet Count - Automated : 244 K/uL      12-14    140  |  95<L>  |  66.0<H>  ----------------------------<  153<H>  4.2   |  35.0<H>  |  1.21    Ca    8.9      14 Dec 2018 08:27    Summary:   1. Left ventricular ejection fraction, by visual estimation, is 65 to   70%.   2. Normal global left ventricular systolic function.   3. There is mild concentric left ventricular hypertrophy.   4. Moderately enlarged right ventricle.   5. Moderately reduced RV systolic function.   6. There is no evidence of pericardial effusion.   7. Moderate mitral valve regurgitation.   8. Thickening of the anterior and posterior mitral valve leaflets.   9. Moderate tricuspid regurgitation.  10. Sclerotic aortic valve with normal opening.  11. Trace pulmonic valve regurgitation.  12. Estimated pulmonary artery systolic pressure is 45.7 mmHg assuming a   right atrial pressure of 8 mmHg, which is consistent with mild pulmonary   hypertension.    NUCLEAR FINDINGS:  Review of raw images demonstrated increased gut uptake.  Dense breast tissue noted anterolaterally.  LV cavity size  is normal.  There janae a medium sized mild defect involving  the basal to distal inferior segments on stress imaging.  Rest imaging demonstrated normalization of the inferior  defect.  Gated SPECT imaging demonstrated normal wall  motion with a post stress calculated LVEF of 67%.  ------------------------------------------------------------------------    IMPRESSIONS:    Single vessel, inferior ischemia involving the RCA  territory is present.  Intermediate risk scan.  Normal wall motion, LVEF 67%    right heart cath    Pressures:  -- Pulmonary Artery (S/D/M): 74/32/46  Pressures:  -- Pulmonary Capillary Wedge: 38/41/30  Pressures:  -- Right Atrium (a/v/M): 30/36/26  Pressures:  -- Right Ventricle (s/edp): 79/28/-- [place] [facility]  [facility]ACT[facility]  [address] [place] [address]  P   F     REASON FOR VISIT:  Follow up on chf  UPDATE:  Left lower sonogram negative for dvt  patient still with edema     Denies any cp sob or palp  Resp  no cough no dyspnea  Gi no abd pain    MEDICATIONS  (STANDING):  isosorbide   mononitrate ER Tablet (IMDUR) 60 milliGRAM(s) Oral daily  metolazone 2.5 milliGRAM(s) Oral daily  aspirin  chewable 81 milliGRAM(s) Oral daily  atorvastatin 40 milliGRAM(s) Oral at bedtime  carvedilol 25 milliGRAM(s) Oral every 12 hours  clopidogrel Tablet 75 milliGRAM(s) Oral daily  torsemide 40 milliGRAM(s) Oral two times a day    docusate sodium 100 milliGRAM(s) Oral two times a day  DULoxetine 60 milliGRAM(s) Oral daily  epoetin sara Injectable 45514 Unit(s) SubCutaneous <User Schedule>  gabapentin 100 milliGRAM(s) Oral three times a day  insulin glargine Injectable (LANTUS) 7 Unit(s) SubCutaneous at bedtime  insulin lispro (HumaLOG) corrective regimen sliding scale   SubCutaneous three times a day before meals  pantoprazole    Tablet 40 milliGRAM(s) Oral before breakfast    Weight 11/25/2018 214  Now 220      Vital Signs Last 24 Hrs  T(C): 36.7 (15 Dec 2018 15:10), Max: 36.7 (14 Dec 2018 16:40)  T(F): 98.1 (15 Dec 2018 15:10), Max: 98.1 (14 Dec 2018 23:44)  HR: 67 (15 Dec 2018 15:10) (63 - 69)  BP: 131/62 (15 Dec 2018 15:10) (120/61 - 131/62)  BP(mean): --  RR: 18 (15 Dec 2018 15:10) (18 - 18)  SpO2: 97% (15 Dec 2018 15:10) (95% - 100%)    HEENT  Unremarkable  CV s1&s2 regular  RESP  decreased bs  GI  soft non tender  EXT + edema  NEURO alert oriented      LABS:  CBC Full  -  ( 14 Dec 2018 08:27 )  WBC Count : 6.6 K/uL  Hemoglobin : 8.1 g/dL  Hematocrit : 23.6 %  Platelet Count - Automated : 244 K/uL      12-14    140  |  95<L>  |  66.0<H>  ----------------------------<  153<H>  4.2   |  35.0<H>  |  1.21    Ca    8.9      14 Dec 2018 08:27    Summary:   1. Left ventricular ejection fraction, by visual estimation, is 65 to   70%.   2. Normal global left ventricular systolic function.   3. There is mild concentric left ventricular hypertrophy.   4. Moderately enlarged right ventricle.   5. Moderately reduced RV systolic function.   6. There is no evidence of pericardial effusion.   7. Moderate mitral valve regurgitation.   8. Thickening of the anterior and posterior mitral valve leaflets.   9. Moderate tricuspid regurgitation.  10. Sclerotic aortic valve with normal opening.  11. Trace pulmonic valve regurgitation.  12. Estimated pulmonary artery systolic pressure is 45.7 mmHg assuming a   right atrial pressure of 8 mmHg, which is consistent with mild pulmonary   hypertension.    NUCLEAR FINDINGS:  Review of raw images demonstrated increased gut uptake.  Dense breast tissue noted anterolaterally.  LV cavity size  is normal.  There janae a medium sized mild defect involving  the basal to distal inferior segments on stress imaging.  Rest imaging demonstrated normalization of the inferior  defect.  Gated SPECT imaging demonstrated normal wall  motion with a post stress calculated LVEF of 67%.  ------------------------------------------------------------------------    IMPRESSIONS:    Single vessel, inferior ischemia involving the RCA  territory is present.  Intermediate risk scan.  Normal wall motion, LVEF 67%    right heart cath    Pressures:  -- Pulmonary Artery (S/D/M): 74/32/46  Pressures:  -- Pulmonary Capillary Wedge: 38/41/30  Pressures:  -- Right Atrium (a/v/M): 30/36/26  Pressures:  -- Right Ventricle (s/edp): 79/28/--

## 2018-12-15 NOTE — PROGRESS NOTE ADULT - PROBLEM SELECTOR PLAN 1
Continue Metolazone and Torsemide, improved symptoms since fluid removal, she has sever pulmonary HTN on cardiac cath, will continue with diuretics, Renal function stable.

## 2018-12-15 NOTE — PROGRESS NOTE ADULT - SUBJECTIVE AND OBJECTIVE BOX
NEPHROLOGY INTERVAL HPI/OVERNIGHT EVENTS:    Examined earlier    MEDICATIONS  (STANDING):  aspirin  chewable 81 milliGRAM(s) Oral daily  atorvastatin 40 milliGRAM(s) Oral at bedtime  carvedilol 25 milliGRAM(s) Oral every 12 hours  clopidogrel Tablet 75 milliGRAM(s) Oral daily  dextrose 5%. 1000 milliLiter(s) (50 mL/Hr) IV Continuous <Continuous>  dextrose 50% Injectable 12.5 Gram(s) IV Push once  dextrose 50% Injectable 25 Gram(s) IV Push once  dextrose 50% Injectable 25 Gram(s) IV Push once  docusate sodium 100 milliGRAM(s) Oral two times a day  DULoxetine 60 milliGRAM(s) Oral daily  epoetin sara Injectable 76855 Unit(s) SubCutaneous <User Schedule>  gabapentin 100 milliGRAM(s) Oral three times a day  insulin glargine Injectable (LANTUS) 7 Unit(s) SubCutaneous at bedtime  insulin lispro (HumaLOG) corrective regimen sliding scale   SubCutaneous three times a day before meals  isosorbide   mononitrate ER Tablet (IMDUR) 60 milliGRAM(s) Oral daily  metolazone 2.5 milliGRAM(s) Oral daily  pantoprazole    Tablet 40 milliGRAM(s) Oral before breakfast  torsemide 40 milliGRAM(s) Oral two times a day    MEDICATIONS  (PRN):  acetaminophen   Tablet .. 650 milliGRAM(s) Oral every 6 hours PRN Temp greater or equal to 38C (100.4F), Mild Pain (1 - 3)  bisacodyl Suppository 10 milliGRAM(s) Rectal daily PRN Constipation  dextrose 40% Gel 15 Gram(s) Oral once PRN Blood Glucose LESS THAN 70 milliGRAM(s)/deciliter  glucagon  Injectable 1 milliGRAM(s) IntraMuscular once PRN Glucose LESS THAN 70 milligrams/deciliter  lactulose Syrup 10 Gram(s) Oral three times a day PRN colnstipaiton      Allergies    Accupril (Other)    Intolerances        Vital Signs Last 24 Hrs  T(C): 36.7 (14 Dec 2018 23:44), Max: 36.7 (14 Dec 2018 16:40)  T(F): 98.1 (14 Dec 2018 23:44), Max: 98.1 (14 Dec 2018 23:44)  HR: 69 (15 Dec 2018 08:11) (63 - 69)  BP: 129/63 (14 Dec 2018 23:44) (120/61 - 129/63)  BP(mean): --  RR: 18 (14 Dec 2018 23:44) (18 - 18)  SpO2: 99% (15 Dec 2018 08:11) (95% - 100%)  Daily     Daily Weight in k.8 (15 Dec 2018 09:38)    PHYSICAL EXAM:  GENERAL: NAD  HEENT: same  NECK: Supple, +JVD  NERVOUS SYSTEM:  Alert & Oriented in bed  CHEST/LUNG: Diminished BS right side, no wheezes  HEART: Regular rate and rhythm; no rub  ABDOMEN: Soft, Nontender,+ distention; +BS; + flank edema  EXTREMITIES:   leg edema better    LABS:                        8.1    6.6   )-----------( 244      ( 14 Dec 2018 08:27 )             23.6     12-14    140  |  95<L>  |  66.0<H>  ----------------------------<  153<H>  4.2   |  35.0<H>  |  1.21    Ca    8.9      14 Dec 2018 08:27                  RADIOLOGY & ADDITIONAL TESTS:

## 2018-12-15 NOTE — PROGRESS NOTE ADULT - ASSESSMENT
KEITH-  better  Cr 1.2 yest awaiting new labs   Improved electrolytes ok  CHF (EF= 70%; mild pulm HTN)==> diastolic dysfunction  Renal sono repeated and unremarkable  CHF now compensated  - would continue diuretics to keep azotemic  - daily weights  - Pleural effusion may need to be drained   pulm f/u noted    Will follow

## 2018-12-16 DIAGNOSIS — R55 SYNCOPE AND COLLAPSE: ICD-10-CM

## 2018-12-16 LAB
ANION GAP SERPL CALC-SCNC: 12 MMOL/L — SIGNIFICANT CHANGE UP (ref 5–17)
BASE EXCESS BLDA CALC-SCNC: 13.1 MMOL/L — HIGH (ref -2–2)
BLOOD GAS COMMENTS ARTERIAL: SIGNIFICANT CHANGE UP
BUN SERPL-MCNC: 62 MG/DL — HIGH (ref 8–20)
CALCIUM SERPL-MCNC: 9 MG/DL — SIGNIFICANT CHANGE UP (ref 8.6–10.2)
CHLORIDE SERPL-SCNC: 91 MMOL/L — LOW (ref 98–107)
CO2 SERPL-SCNC: 39 MMOL/L — HIGH (ref 22–29)
CREAT SERPL-MCNC: 1.37 MG/DL — HIGH (ref 0.5–1.3)
GAS PNL BLDA: SIGNIFICANT CHANGE UP
GLUCOSE BLDC GLUCOMTR-MCNC: 204 MG/DL — HIGH (ref 70–99)
GLUCOSE BLDC GLUCOMTR-MCNC: 209 MG/DL — HIGH (ref 70–99)
GLUCOSE BLDC GLUCOMTR-MCNC: 234 MG/DL — HIGH (ref 70–99)
GLUCOSE BLDC GLUCOMTR-MCNC: 235 MG/DL — HIGH (ref 70–99)
GLUCOSE BLDC GLUCOMTR-MCNC: 236 MG/DL — HIGH (ref 70–99)
GLUCOSE BLDC GLUCOMTR-MCNC: 258 MG/DL — HIGH (ref 70–99)
GLUCOSE SERPL-MCNC: 204 MG/DL — HIGH (ref 70–115)
HCO3 BLDA-SCNC: 37 MMOL/L — HIGH (ref 20–26)
HCT VFR BLD CALC: 27.3 % — LOW (ref 37–47)
HGB BLD-MCNC: 9.1 G/DL — LOW (ref 12–16)
HOROWITZ INDEX BLDA+IHG-RTO: SIGNIFICANT CHANGE UP
MCHC RBC-ENTMCNC: 27.2 PG — SIGNIFICANT CHANGE UP (ref 27–31)
MCHC RBC-ENTMCNC: 33.3 G/DL — SIGNIFICANT CHANGE UP (ref 32–36)
MCV RBC AUTO: 81.7 FL — SIGNIFICANT CHANGE UP (ref 81–99)
PCO2 BLDA: 66 MMHG — HIGH (ref 35–45)
PH BLDA: 7.39 — SIGNIFICANT CHANGE UP (ref 7.35–7.45)
PLATELET # BLD AUTO: 258 K/UL — SIGNIFICANT CHANGE UP (ref 150–400)
PO2 BLDA: 55 MMHG — LOW (ref 83–108)
POTASSIUM SERPL-MCNC: 4.2 MMOL/L — SIGNIFICANT CHANGE UP (ref 3.5–5.3)
POTASSIUM SERPL-SCNC: 4.2 MMOL/L — SIGNIFICANT CHANGE UP (ref 3.5–5.3)
RBC # BLD: 3.34 M/UL — LOW (ref 4.4–5.2)
RBC # FLD: 20.2 % — HIGH (ref 11–15.6)
SAO2 % BLDA: 86 % — LOW (ref 95–99)
SODIUM SERPL-SCNC: 142 MMOL/L — SIGNIFICANT CHANGE UP (ref 135–145)
WBC # BLD: 12.6 K/UL — HIGH (ref 4.8–10.8)
WBC # FLD AUTO: 12.6 K/UL — HIGH (ref 4.8–10.8)

## 2018-12-16 PROCEDURE — 99232 SBSQ HOSP IP/OBS MODERATE 35: CPT

## 2018-12-16 PROCEDURE — 99233 SBSQ HOSP IP/OBS HIGH 50: CPT

## 2018-12-16 PROCEDURE — 71045 X-RAY EXAM CHEST 1 VIEW: CPT | Mod: 26

## 2018-12-16 PROCEDURE — 93010 ELECTROCARDIOGRAM REPORT: CPT

## 2018-12-16 RX ORDER — ONDANSETRON 8 MG/1
4 TABLET, FILM COATED ORAL ONCE
Qty: 0 | Refills: 0 | Status: COMPLETED | OUTPATIENT
Start: 2018-12-16 | End: 2018-12-16

## 2018-12-16 RX ORDER — IPRATROPIUM/ALBUTEROL SULFATE 18-103MCG
3 AEROSOL WITH ADAPTER (GRAM) INHALATION ONCE
Qty: 0 | Refills: 0 | Status: COMPLETED | OUTPATIENT
Start: 2018-12-16 | End: 2018-12-16

## 2018-12-16 RX ADMIN — Medication 100 MILLIGRAM(S): at 18:10

## 2018-12-16 RX ADMIN — Medication 40 MILLIGRAM(S): at 07:00

## 2018-12-16 RX ADMIN — GABAPENTIN 100 MILLIGRAM(S): 400 CAPSULE ORAL at 00:12

## 2018-12-16 RX ADMIN — Medication 100 MILLIGRAM(S): at 07:01

## 2018-12-16 RX ADMIN — CARVEDILOL PHOSPHATE 25 MILLIGRAM(S): 80 CAPSULE, EXTENDED RELEASE ORAL at 18:10

## 2018-12-16 RX ADMIN — ATORVASTATIN CALCIUM 40 MILLIGRAM(S): 80 TABLET, FILM COATED ORAL at 22:43

## 2018-12-16 RX ADMIN — ONDANSETRON 4 MILLIGRAM(S): 8 TABLET, FILM COATED ORAL at 15:53

## 2018-12-16 RX ADMIN — INSULIN GLARGINE 7 UNIT(S): 100 INJECTION, SOLUTION SUBCUTANEOUS at 00:13

## 2018-12-16 RX ADMIN — GABAPENTIN 100 MILLIGRAM(S): 400 CAPSULE ORAL at 15:53

## 2018-12-16 RX ADMIN — Medication 2: at 18:09

## 2018-12-16 RX ADMIN — ISOSORBIDE MONONITRATE 60 MILLIGRAM(S): 60 TABLET, EXTENDED RELEASE ORAL at 12:53

## 2018-12-16 RX ADMIN — DULOXETINE HYDROCHLORIDE 60 MILLIGRAM(S): 30 CAPSULE, DELAYED RELEASE ORAL at 12:53

## 2018-12-16 RX ADMIN — GABAPENTIN 100 MILLIGRAM(S): 400 CAPSULE ORAL at 22:43

## 2018-12-16 RX ADMIN — PANTOPRAZOLE SODIUM 40 MILLIGRAM(S): 20 TABLET, DELAYED RELEASE ORAL at 07:01

## 2018-12-16 RX ADMIN — Medication 81 MILLIGRAM(S): at 12:53

## 2018-12-16 RX ADMIN — Medication 3: at 08:48

## 2018-12-16 RX ADMIN — Medication 2: at 12:53

## 2018-12-16 RX ADMIN — CARVEDILOL PHOSPHATE 25 MILLIGRAM(S): 80 CAPSULE, EXTENDED RELEASE ORAL at 07:00

## 2018-12-16 RX ADMIN — GABAPENTIN 100 MILLIGRAM(S): 400 CAPSULE ORAL at 07:01

## 2018-12-16 RX ADMIN — ATORVASTATIN CALCIUM 40 MILLIGRAM(S): 80 TABLET, FILM COATED ORAL at 00:12

## 2018-12-16 RX ADMIN — Medication 3 MILLILITER(S): at 10:34

## 2018-12-16 RX ADMIN — CLOPIDOGREL BISULFATE 75 MILLIGRAM(S): 75 TABLET, FILM COATED ORAL at 12:53

## 2018-12-16 RX ADMIN — Medication 40 MILLIGRAM(S): at 18:10

## 2018-12-16 RX ADMIN — INSULIN GLARGINE 7 UNIT(S): 100 INJECTION, SOLUTION SUBCUTANEOUS at 22:43

## 2018-12-16 NOTE — PROGRESS NOTE ADULT - ASSESSMENT
62 y/o female with PMHx of CAD s/p CABG x1 Stent placement x2,  PAD s/p balloon angioplasty, DM2, HTN, HLD came to the ED complaining of worsening dyspnea with minimal exertion and increasing abdominal girth over the past 2 weeks.  Echo with ef 65%, mild lvh, mod reduced rv function rsvp (mild pulm htn) presents with Cook, leg edema  anasarca  Echo ef 65%  right sys pressure 38, stress test c/w inferior ischemic with review of cath poorly vascularized region so no intervention, and right heart cath revealing right and left heart failure.  Patient was diuresed and developed atn.  Diuretics held patient developed hypercapnic resp failure, right pleural effusion s/p thoracentsis

## 2018-12-16 NOTE — PROGRESS NOTE ADULT - SUBJECTIVE AND OBJECTIVE BOX
NEPHROLOGY INTERVAL HPI/OVERNIGHT EVENTS:    Examined earlier    MEDICATIONS  (STANDING):  aspirin  chewable 81 milliGRAM(s) Oral daily  atorvastatin 40 milliGRAM(s) Oral at bedtime  carvedilol 25 milliGRAM(s) Oral every 12 hours  clopidogrel Tablet 75 milliGRAM(s) Oral daily  dextrose 5%. 1000 milliLiter(s) (50 mL/Hr) IV Continuous <Continuous>  dextrose 50% Injectable 12.5 Gram(s) IV Push once  dextrose 50% Injectable 25 Gram(s) IV Push once  dextrose 50% Injectable 25 Gram(s) IV Push once  docusate sodium 100 milliGRAM(s) Oral two times a day  DULoxetine 60 milliGRAM(s) Oral daily  epoetin sara Injectable 33166 Unit(s) SubCutaneous <User Schedule>  gabapentin 100 milliGRAM(s) Oral three times a day  insulin glargine Injectable (LANTUS) 7 Unit(s) SubCutaneous at bedtime  insulin lispro (HumaLOG) corrective regimen sliding scale   SubCutaneous three times a day before meals  isosorbide   mononitrate ER Tablet (IMDUR) 60 milliGRAM(s) Oral daily  metolazone 2.5 milliGRAM(s) Oral daily  ondansetron Injectable 4 milliGRAM(s) IV Push once  pantoprazole    Tablet 40 milliGRAM(s) Oral before breakfast  torsemide 40 milliGRAM(s) Oral two times a day    MEDICATIONS  (PRN):  acetaminophen   Tablet .. 650 milliGRAM(s) Oral every 6 hours PRN Temp greater or equal to 38C (100.4F), Mild Pain (1 - 3)  bisacodyl Suppository 10 milliGRAM(s) Rectal daily PRN Constipation  dextrose 40% Gel 15 Gram(s) Oral once PRN Blood Glucose LESS THAN 70 milliGRAM(s)/deciliter  glucagon  Injectable 1 milliGRAM(s) IntraMuscular once PRN Glucose LESS THAN 70 milligrams/deciliter  lactulose Syrup 10 Gram(s) Oral three times a day PRN colnstipaiton      Allergies    Accupril (Other)    Intolerances        Vital Signs Last 24 Hrs  T(C): 36.9 (16 Dec 2018 10:15), Max: 36.9 (16 Dec 2018 10:15)  T(F): 98.5 (16 Dec 2018 10:15), Max: 98.5 (16 Dec 2018 10:15)  HR: 67 (16 Dec 2018 10:15) (63 - 68)  BP: 141/68 (16 Dec 2018 10:15) (98/59 - 141/68)  BP(mean): --  RR: 17 (16 Dec 2018 10:15) (17 - 19)  SpO2: 100% (16 Dec 2018 10:15) (96% - 100%)  Daily     Daily     PHYSICAL EXAM:  GENERAL: NAD  HEENT: same  NECK: Supple, +JVD  NERVOUS SYSTEM:  Alert & Oriented in bed  CHEST/LUNG: Diminished BS right side, no wheezes  HEART: Regular rate and rhythm; no rub  ABDOMEN: Soft, Nontender,+ distention; +BS; + flank edema  EXTREMITIES:   leg edema better    LABS:                ABG - ( 16 Dec 2018 09:29 )  pH, Arterial: 7.39  pH, Blood: x     /  pCO2: 66    /  pO2: 55    / HCO3: 37    / Base Excess: 13.1  /  SaO2: 86                    RADIOLOGY & ADDITIONAL TESTS:

## 2018-12-16 NOTE — CHART NOTE - NSCHARTNOTEFT_GEN_A_CORE
Rapid Response PGY 3 Note    [id number]  KUN KATEBARTOLOCLARA    Rapid Response was called on a 63y year old Female patient for unresponsiveness.    RRT arrive and assisted Dr. Hendrix who was at bedside.  Patient had a brief episode of syncope followed by bilious non-bloody emesis. Her mental status was returning to baseline as we arrived.      Allergies    Accupril (Other)    Intolerances        PAST MEDICAL & SURGICAL HISTORY:  Herniated disc, cervical  PAD (peripheral artery disease)  Legally blind  History of peripheral vascular disease  History of retinal detachment  History of CEA (carotid endarterectomy): Right  Hyperlipidemia  Glaucoma  Hypertension  Diabetes  S/P CABG x 1  After cataract  Uveitic glaucoma of both eyes  Detached retina  Atherosclerosis of right carotid artery   delivery delivered      Vital Signs Last 24 Hrs  T(C): 36.9 (16 Dec 2018 10:15), Max: 36.9 (16 Dec 2018 10:15)  T(F): 98.5 (16 Dec 2018 10:15), Max: 98.5 (16 Dec 2018 10:15)  HR: 67 (16 Dec 2018 10:15) (63 - 68)  BP: 141/68 (16 Dec 2018 10:15) (98/59 - 141/68)  RR: 17 (16 Dec 2018 10:15) (17 - 19)  SpO2: 100% (16 Dec 2018 10:15) (96% - 100%)          PHYSICAL EXAM:    GENERAL: obese elderly female in NAD  HEAD:  Atraumatic, Normocephalic  EYES: EOMI, PERRLA, conjunctiva and sclera clear  ENMT: patent airway, moist mucous membranes  NECK: Supple, No JVD, Normal thyroid  NERVOUS SYSTEM:  Alert & Oriented X3  CHEST/LUNG: decreased breath sounds at lung bases  HEART: Regular rate and rhythm; No murmurs, rubs, or gallops  ABDOMEN: Soft, Nontender, Nondistended; Bowel sounds present, obese  EXTREMITIES: no calf tenderness      12-15 @ 07:01  -  12-16 @ 07:00  --------------------------------------------------------  IN: 0 mL / OUT: 1600 mL / NET: -1600 mL                ABG - ( 16 Dec 2018 09:29 )  pH, Arterial: 7.39  pH, Blood: x     /  pCO2: 66    /  pO2: 55    / HCO3: 37    / Base Excess: 13.1  /  SaO2: 86                            Vital Signs Last 24 Hrs*       Assessment- Rapid Response called for 63y year old Female for syncope, most likely vasovagal.    Plan-  - ABG and EKG obtained. +hypercarbia, although likely compensated.  - As mental status had returned to baseline and no further episodes of nausea or vomiting, it was felt safe to continue with PRN BIPAP nocturnally and while sleeping  - CBC, CMP, mag, phos ordered  - Dr Hendrix at bedside throughout event

## 2018-12-16 NOTE — PROGRESS NOTE ADULT - PROBLEM SELECTOR PLAN 1
Continue Metolazone and Torsemide, improved symptoms since fluid removal, she has sever pulmonary HTN on cardiac cath, will continue with diuretics, Renal function stable, her repeat CXR is same as before.

## 2018-12-16 NOTE — PROGRESS NOTE ADULT - ASSESSMENT
Assess    OHS/KYREE/Diastolic HF with hypercarbia and recurrent effusion  CAD  Restrictive pulmonary impairment with chronic hypercarbic respiratory failure with progressive impairment  Vasovagal syncopy  Possible aspiration      Plan    Cardiac regiment  Nocturnal and prn Bipap  02 as needed - HiFlo if needed  Pleurex drainage if effusion continues to recur  NIV on discharge to lower PC02 and to prevent recurrent hospitalization  Bipap was considered but rejected due to the progressive nature of the patient's condition

## 2018-12-16 NOTE — PROGRESS NOTE ADULT - PROBLEM SELECTOR PLAN 9
This morning RRT was called as patient was found to have vomitus all over her cloths and she lost consciousness, as per nursing staff she was ok before she vomited, whole team arrived and she was not responsive for few minutes but she was breathing and was noted have pulse and recordable BP and was WNL, she was given sternal rub she woke up and then she started responding to verbal and tactile stimulus, she was noted to have low O2 sat and was put on Venti mask and oxygen level came up, her CXR done showed no change compare to previous XR, her EKG done showed some t waves changes, same as previous EKG, labs order and ABG done abg showed some hypercapnia, she became alert oriented X3 and was started on BiPAP, EKG and patient's situation discuss with cardiology, she is being transferred to monitored bed will cycle trop, will give her zofran PRN for nausea and vomiting, her syncope looks like related to Vasovagal episode. 02-Sep-2018 02:54

## 2018-12-16 NOTE — PROGRESS NOTE ADULT - PROBLEM SELECTOR PLAN 3
torsemide 40 milliGRAM(s) Oral two times a day (was on 20mg bid prior to hospital)  metolazone qd  would continue current dose of diuresis  patient is diuresing well  Low na diet   Fluid restrictions  I< o  diursing well

## 2018-12-16 NOTE — PROGRESS NOTE ADULT - PROBLEM SELECTOR PROBLEM 6
Hypercapnic respiratory failure

## 2018-12-16 NOTE — PROGRESS NOTE ADULT - SUBJECTIVE AND OBJECTIVE BOX
KUN ROCHA    [id number]    63y      Female    Patient is a 63y old  Female who presents with a chief complaint of Shortness of breath (16 Dec 2018 11:00)      INTERVAL HPI/OVERNIGHT EVENTS:    This morning RRT was called as patient was found to have vomtus all over her cloths and she lost conciousness, as per nursing staff she was ok, before she vomited, she was out for few minutes then she started responding to verbal and tactile stimulus, she was noted to have low O2 sat and was put on Venti mask and oxygen level came up, she is alert oriented X3 now, CXR, labs and ekg done, she denies chest pain, palpitation, weakness or numbness of any part of the body, she also denies fever, chills.     REVIEW OF SYSTEMS:    CONSTITUTIONAL: No fever, has fatigue  RESPIRATORY: No cough, some shortness of breath  CARDIOVASCULAR: No chest pain, palpitations  GASTROINTESTINAL: No abdominal, No nausea, vomiting  NEUROLOGICAL: No headaches,  loss of strength.  MISCELLANEOUS: No joint swelling or pain       Vital Signs Last 24 Hrs  T(C): 36.9 (16 Dec 2018 13:10), Max: 36.9 (16 Dec 2018 10:15)  T(F): 98.4 (16 Dec 2018 13:10), Max: 98.5 (16 Dec 2018 10:15)  HR: 70 (16 Dec 2018 13:10) (63 - 70)  BP: 142/70 (16 Dec 2018 13:10) (98/59 - 146/70)  RR: 18 (16 Dec 2018 13:10) (17 - 19)  SpO2: 99% (16 Dec 2018 13:10) (96% - 100%)    PHYSICAL EXAM:    GENERAL: Middle age female looking comfortable.   HEENT: Right eye Corneal scaring and right eye blindness                                                                             NECK: soft, Supple, No JVD,   CHEST/LUNG: Decrease air entry and some basal crackles bilaterally; No wheezing  HEART: S1S2+, Regular rate and rhythm; No murmurs  ABDOMEN: Soft, Nontender, Nondistended; Bowel sounds present  EXTREMITIES:  1+ Peripheral Pulses, +1 edema b/l, both legs has previous surgical scar.   SKIN: No rashes or lesions  NEURO: AAOX3, no focal deficits, no motor r sensory loss  PSYCH: normal mood        LABS:                        9.1    12.6  )-----------( 258      ( 16 Dec 2018 12:13 )             27.3     12-16    142  |  91<L>  |  62.0<H>  ----------------------------<  204<H>  4.2   |  39.0<H>  |  1.37<H>    Ca    9.0      16 Dec 2018 12:13              I&O's Summary    15 Dec 2018 07:01  -  16 Dec 2018 07:00  --------------------------------------------------------  IN: 0 mL / OUT: 1600 mL / NET: -1600 mL        MEDICATIONS  (STANDING):  aspirin  chewable 81 milliGRAM(s) Oral daily  atorvastatin 40 milliGRAM(s) Oral at bedtime  carvedilol 25 milliGRAM(s) Oral every 12 hours  clopidogrel Tablet 75 milliGRAM(s) Oral daily  dextrose 5%. 1000 milliLiter(s) (50 mL/Hr) IV Continuous <Continuous>  dextrose 50% Injectable 12.5 Gram(s) IV Push once  dextrose 50% Injectable 25 Gram(s) IV Push once  dextrose 50% Injectable 25 Gram(s) IV Push once  docusate sodium 100 milliGRAM(s) Oral two times a day  DULoxetine 60 milliGRAM(s) Oral daily  epoetin sara Injectable 29020 Unit(s) SubCutaneous <User Schedule>  gabapentin 100 milliGRAM(s) Oral three times a day  insulin glargine Injectable (LANTUS) 7 Unit(s) SubCutaneous at bedtime  insulin lispro (HumaLOG) corrective regimen sliding scale   SubCutaneous three times a day before meals  isosorbide   mononitrate ER Tablet (IMDUR) 60 milliGRAM(s) Oral daily  metolazone 2.5 milliGRAM(s) Oral daily  ondansetron Injectable 4 milliGRAM(s) IV Push once  pantoprazole    Tablet 40 milliGRAM(s) Oral before breakfast  torsemide 40 milliGRAM(s) Oral two times a day    MEDICATIONS  (PRN):  acetaminophen   Tablet .. 650 milliGRAM(s) Oral every 6 hours PRN Temp greater or equal to 38C (100.4F), Mild Pain (1 - 3)  bisacodyl Suppository 10 milliGRAM(s) Rectal daily PRN Constipation  dextrose 40% Gel 15 Gram(s) Oral once PRN Blood Glucose LESS THAN 70 milliGRAM(s)/deciliter  glucagon  Injectable 1 milliGRAM(s) IntraMuscular once PRN Glucose LESS THAN 70 milligrams/deciliter  lactulose Syrup 10 Gram(s) Oral three times a day PRN colnstipaiton

## 2018-12-16 NOTE — PROGRESS NOTE ADULT - SUBJECTIVE AND OBJECTIVE BOX
[facility]  [facility]  [address] [place] [address]  P   F     REASON FOR VISIT:  Follow up on chf  UPDATE:  Patient had multiple episodes of nausea and vomiting. syncopal episode.       Denies any cp sob or palp  Resp  no cough no dyspnea  Gi no abd pain + nause and vomiting.     MEDICATIONS  (STANDING):  MEDICATIONS  (STANDING):  carvedilol 25 milliGRAM(s) Oral every 12 hours  isosorbide   mononitrate ER Tablet (IMDUR) 60 milliGRAM(s) Oral daily  metolazone 2.5 milliGRAM(s) Oral daily  torsemide 40 milliGRAM(s) Oral two times a day    docusate sodium  pantoprazole    Tablet  aspirin  chewable  atorvastatin  clopidogrel Tablet  dextrose 5%.  dextrose 50% Injectable  dextrose 50% Injectable  dextrose 50% Injectable  DULoxetine  epoetin sara Injectable  gabapentin  insulin glargine Injectable (LANTUS)  insulin lispro (HumaLOG) corrective regimen sliding scale    PRN: acetaminophen   Tablet .. PRN  bisacodyl Suppository PRN  dextrose 40% Gel PRN  glucagon  Injectable PRN  lactulose Syrup PRN    Now 220      Vital Signs Last 24 Hrs  Vital Signs Last 24 Hrs  T(C): 36.8 (12-16-18 @ 16:13), Max: 36.9 (12-16-18 @ 10:15)  T(F): 98.3 (12-16-18 @ 16:13), Max: 98.5 (12-16-18 @ 10:15)  HR: 75 (12-16-18 @ 16:13) (63 - 75)  BP: 133/63 (12-16-18 @ 16:13) (98/59 - 146/70)  BP(mean): --  RR: 20 (12-16-18 @ 16:13) (17 - 20)  SpO2: 99% (12-16-18 @ 13:10) (96% - 100%)    PHYSICAL EXAM:  Appearance: Comfortable. No acute distress  HEENT:  Head and neck: Atraumatic. Normocephalic.  Normal oral mucosa, PERRL, Neck is supple. No JVD, No carotid bruit.   Neurologic: A & O x 3, no focal deficits. EOMI , Cranial nerves are intact.  Lymphatic: No cervical lymphadenopathy  Cardiovascular: Normal S1 S2, No murmur, rubs/gallops. No JVD, No edema  Respiratory: Lungs clear to auscultation  Gastrointestinal:  Soft, Non-tender, + BS  Lower Extremities: No edema  Psychiatry: Patient is calm. No agitation. Mood & affect appropriate  Skin: No rashes/ ecchymoses/cyanosis/ulcers visualized on the face, hands or feet.      LABS:  CBC Full  -  ( 14 Dec 2018 08:27 )  WBC Count : 6.6 K/uL  Hemoglobin : 8.1 g/dL  Hematocrit : 23.6 %  Platelet Count - Automated : 244 K/uL      1                      9.1    12.6  )-----------( 258      ( 16 Dec 2018 12:13 )             27.3   N=x    ; L=x        16 Dec 2018 12:13    142    |  91     |  62.0   ----------------------------<  204    4.2     |  39.0   |  1.37     Ca    9.0        16 Dec 2018 12:13    Ca    8.9      14 Dec 2018 08:27    Summary:   1. Left ventricular ejection fraction, by visual estimation, is 65 to   70%.   2. Normal global left ventricular systolic function.   3. There is mild concentric left ventricular hypertrophy.   4. Moderately enlarged right ventricle.   5. Moderately reduced RV systolic function.   6. There is no evidence of pericardial effusion.   7. Moderate mitral valve regurgitation.   8. Thickening of the anterior and posterior mitral valve leaflets.   9. Moderate tricuspid regurgitation.  10. Sclerotic aortic valve with normal opening.  11. Trace pulmonic valve regurgitation.  12. Estimated pulmonary artery systolic pressure is 45.7 mmHg assuming a   right atrial pressure of 8 mmHg, which is consistent with mild pulmonary   hypertension.    NUCLEAR FINDINGS:  Review of raw images demonstrated increased gut uptake.  Dense breast tissue noted anterolaterally.  LV cavity size  is normal.  There janae a medium sized mild defect involving  the basal to distal inferior segments on stress imaging.  Rest imaging demonstrated normalization of the inferior  defect.  Gated SPECT imaging demonstrated normal wall  motion with a post stress calculated LVEF of 67%.  ------------------------------------------------------------------------    IMPRESSIONS:    Single vessel, inferior ischemia involving the RCA  territory is present.  Intermediate risk scan.  Normal wall motion, LVEF 67%    right heart cath    Pressures:  -- Pulmonary Artery (S/D/M): 74/32/46  Pressures:  -- Pulmonary Capillary Wedge: 38/41/30  Pressures:  -- Right Atrium (a/v/M): 30/36/26  Pressures:  -- Right Ventricle (s/edp): 79/28/--

## 2018-12-16 NOTE — PROGRESS NOTE ADULT - ASSESSMENT
KEITH-  better  Last Cr 1.2 awaiting new labs   Improved electrolytes ok on last set of labs    CHF (EF= 70%; mild pulm HTN)==> diastolic dysfunction  Renal sono repeated and unremarkable  CHF now compensated  - would continue diuretics to keep azotemic  - daily weights  - Pleural effusion may need to be drained     Will follow

## 2018-12-16 NOTE — CHART NOTE - NSCHARTNOTEFT_GEN_A_CORE
RRT Follow-up    Patient seen and examined. She is feeling well. No further episodes of syncope or emesis.    Vital Signs Last 24 Hrs  T(C): 36.9 (16 Dec 2018 13:10), Max: 36.9 (16 Dec 2018 10:15)  T(F): 98.4 (16 Dec 2018 13:10), Max: 98.5 (16 Dec 2018 10:15)  HR: 70 (16 Dec 2018 13:10) (63 - 70)  BP: 142/70 (16 Dec 2018 13:10) (98/59 - 146/70)  RR: 18 (16 Dec 2018 13:10) (17 - 19)  SpO2: 99% (16 Dec 2018 13:10) (96% - 100%)    Gen- NAD, resting comfortably  Neuro- awake, alert, oriented, non-focal    A/P: appears stable at this time  - further care per primary team

## 2018-12-16 NOTE — PROGRESS NOTE ADULT - SUBJECTIVE AND OBJECTIVE BOX
PULMONARY PROGRESS NOTE      RANJIT ROCHA-[id number]    Patient is a 63y old  Female who presents with a chief complaint of Shortness of breath (14 Dec 2018 11:37)  Acute on chronic diastolic HF  KEITH  DM  HTN  CAD  OHS/KYREE with hypercarbic respiratory failure  Last IR drainage of pleural effusion on     INTERVAL HPI/OVERNIGHT EVENTS:  Vasovagal event with vomiting and desat  Rapid response  Back on Bipap and alert  To Step Down for monitoring    MEDICATIONS  (STANDING):  aspirin  chewable 81 milliGRAM(s) Oral daily  atorvastatin 40 milliGRAM(s) Oral at bedtime  carvedilol 25 milliGRAM(s) Oral every 12 hours  clopidogrel Tablet 75 milliGRAM(s) Oral daily  dextrose 5%. 1000 milliLiter(s) (50 mL/Hr) IV Continuous <Continuous>  dextrose 50% Injectable 12.5 Gram(s) IV Push once  dextrose 50% Injectable 25 Gram(s) IV Push once  dextrose 50% Injectable 25 Gram(s) IV Push once  docusate sodium 100 milliGRAM(s) Oral two times a day  DULoxetine 60 milliGRAM(s) Oral daily  epoetin sara Injectable 40689 Unit(s) SubCutaneous <User Schedule>  gabapentin 100 milliGRAM(s) Oral three times a day  insulin glargine Injectable (LANTUS) 7 Unit(s) SubCutaneous at bedtime  insulin lispro (HumaLOG) corrective regimen sliding scale   SubCutaneous three times a day before meals  isosorbide   mononitrate ER Tablet (IMDUR) 60 milliGRAM(s) Oral daily  metolazone 2.5 milliGRAM(s) Oral daily  pantoprazole    Tablet 40 milliGRAM(s) Oral before breakfast  torsemide 40 milliGRAM(s) Oral two times a day      MEDICATIONS  (PRN):  acetaminophen   Tablet .. 650 milliGRAM(s) Oral every 6 hours PRN Temp greater or equal to 38C (100.4F), Mild Pain (1 - 3)  bisacodyl Suppository 10 milliGRAM(s) Rectal daily PRN Constipation  dextrose 40% Gel 15 Gram(s) Oral once PRN Blood Glucose LESS THAN 70 milliGRAM(s)/deciliter  glucagon  Injectable 1 milliGRAM(s) IntraMuscular once PRN Glucose LESS THAN 70 milligrams/deciliter  lactulose Syrup 10 Gram(s) Oral three times a day PRN colnstipaiton      Allergies    Accupril (Other)    Intolerances        PAST MEDICAL & SURGICAL HISTORY:  Herniated disc, cervical  PAD (peripheral artery disease)  Legally blind  History of peripheral vascular disease  History of retinal detachment  History of CEA (carotid endarterectomy): Right  Hyperlipidemia  Glaucoma  Hypertension  Diabetes  S/P CABG x 1  After cataract  Uveitic glaucoma of both eyes  Detached retina  Atherosclerosis of right carotid artery   delivery delivered      SOCIAL HISTORY  Smoking History:       REVIEW OF SYSTEMS:    CONSTITUTIONAL:  No distress    HEENT:  Eyes:  No diplopia or blurred vision. ENT:  No earache, sore throat or runny nose.    CARDIOVASCULAR:  No pressure, squeezing, tightness, heaviness or aching about the chest; no palpitations.    RESPIRATORY:  No cough, shortness of breath, PND or orthopnea. Mild SOBOE    GASTROINTESTINAL:  No nausea, vomiting or diarrhea.    GENITOURINARY:  No dysuria, frequency or urgency.    NEUROLOGIC:  No paresthesias, fasciculations, seizures or weakness.    PSYCHIATRIC:  No disorder of thought or mood.    Vital Signs Last 24 Hrs  T(C): 36.9 (14 Dec 2018 07:43), Max: 36.9 (14 Dec 2018 07:43)  T(F): 98.4 (14 Dec 2018 07:43), Max: 98.4 (14 Dec 2018 07:43)  HR: 58 (14 Dec 2018 07:43) (58 - 66)  BP: 123/62 (14 Dec 2018 07:43) (123/62 - 131/72)  BP(mean): --  RR: 18 (14 Dec 2018 07:43) (18 - 18)  SpO2: 97% (14 Dec 2018 07:43) (97% - 100%)    PHYSICAL EXAMINATION:    GENERAL: The patient is awake and alert in no apparent distress.     HEENT: Head is normocephalic and atraumatic. Extraocular muscles are intact. Mucous membranes are moist.    NECK: Supple.    LUNGS: Clear to auscultation without wheezing, rales or rhonchi; respirations unlabored    HEART: Regular rate and rhythm without murmur.    ABDOMEN: Soft, nontender, and nondistended.      EXTREMITIES: Without any cyanosis, clubbing, rash, lesions or edema.    NEUROLOGIC: Grossly intact.    LABS:                        8.1    6.6   )-----------( 244      ( 14 Dec 2018 08:27 )             23.6     12-14    140  |  95<L>  |  66.0<H>  ----------------------------<  153<H>  4.2   |  35.0<H>  |  1.21    Ca    8.9      14 Dec 2018 08:27        RADIOLOGY & ADDITIONAL STUDIES:    CXR on   IMPRESSION:  Interval marked improvement in aeration of the right lung field with   persistent small right pleural effusion and right basilar atelectasis.   Unchanged pulmonary vascular congestion. No appreciable pneumothorax.    Cardiomediastinal contours are unchanged. Status post median sternotomy.    CHIARA BREWER M.D., ATTENDING RADIOLOGIST  This document has been electronically signed. Dec 12 2018  1:19PM      Echo on    1. Left ventricular ejection fraction, by visual estimation, is 65 to   70%.   2. Normal global left ventricular systolic function.   3. There is mild concentric left ventricular hypertrophy.   4. Moderately enlarged right ventricle.   5. Moderately reduced RV systolic function.   6. There is no evidence of pericardial effusion.   7. Moderate mitral valve regurgitation.   8. Thickening of the anterior and posterior mitral valve leaflets.   9. Moderate tricuspid regurgitation.  10. Sclerotic aortic valve with normal opening.  11. Trace pulmonic valve regurgitation.  12. Estimated pulmonary artery systolic pressure is 45.7 mmHg assuming a   right atrial pressure of 8 mmHg, which is consistent with mild pulmonary   hypertension.    [id number] Phoenix Alvarado MD, Electronically signed on 2018 at 8:26:51   PM

## 2018-12-16 NOTE — PROGRESS NOTE ADULT - PROBLEM SELECTOR PLAN 6
Needs non invasive ventilation at night.
Improving on NIV, will need the same on discharge.
NIV as ordered by pulmonary, acute on chronic.
NIV as ordered by pulmonary, acute on chronic. Will need NIV at home prior to discharge.
NIV as ordered by pulmonary, acute on chronic. Will need NIV at home prior to discharge.
Needs non invasive ventilation at night, will need upon discharge.
Needs non invasive ventilation at night.

## 2018-12-17 DIAGNOSIS — E66.2 MORBID (SEVERE) OBESITY WITH ALVEOLAR HYPOVENTILATION: ICD-10-CM

## 2018-12-17 DIAGNOSIS — I46.9 CARDIAC ARREST, CAUSE UNSPECIFIED: ICD-10-CM

## 2018-12-17 DIAGNOSIS — G47.33 OBSTRUCTIVE SLEEP APNEA (ADULT) (PEDIATRIC): ICD-10-CM

## 2018-12-17 DIAGNOSIS — I27.20 PULMONARY HYPERTENSION, UNSPECIFIED: ICD-10-CM

## 2018-12-17 LAB
ANION GAP SERPL CALC-SCNC: 6 MMOL/L — SIGNIFICANT CHANGE UP (ref 5–17)
ANION GAP SERPL CALC-SCNC: 7 MMOL/L — SIGNIFICANT CHANGE UP (ref 5–17)
ANISOCYTOSIS BLD QL: SLIGHT — SIGNIFICANT CHANGE UP
APTT BLD: 33.8 SEC — SIGNIFICANT CHANGE UP (ref 27.5–36.3)
BASOPHILS # BLD AUTO: 0 K/UL — SIGNIFICANT CHANGE UP (ref 0–0.2)
BASOPHILS NFR BLD AUTO: 0.2 % — SIGNIFICANT CHANGE UP (ref 0–2)
BUN SERPL-MCNC: 61 MG/DL — HIGH (ref 8–20)
BUN SERPL-MCNC: 61 MG/DL — HIGH (ref 8–20)
CALCIUM SERPL-MCNC: 8.4 MG/DL — LOW (ref 8.6–10.2)
CALCIUM SERPL-MCNC: 8.8 MG/DL — SIGNIFICANT CHANGE UP (ref 8.6–10.2)
CHLORIDE SERPL-SCNC: 91 MMOL/L — LOW (ref 98–107)
CHLORIDE SERPL-SCNC: 92 MMOL/L — LOW (ref 98–107)
CK SERPL-CCNC: 40 U/L — SIGNIFICANT CHANGE UP (ref 25–170)
CK SERPL-CCNC: 45 U/L — SIGNIFICANT CHANGE UP (ref 25–170)
CO2 SERPL-SCNC: 42 MMOL/L — HIGH (ref 22–29)
CO2 SERPL-SCNC: 43 MMOL/L — HIGH (ref 22–29)
CREAT SERPL-MCNC: 1.43 MG/DL — HIGH (ref 0.5–1.3)
CREAT SERPL-MCNC: 1.46 MG/DL — HIGH (ref 0.5–1.3)
EOSINOPHIL # BLD AUTO: 0.2 K/UL — SIGNIFICANT CHANGE UP (ref 0–0.5)
EOSINOPHIL NFR BLD AUTO: 1.7 % — SIGNIFICANT CHANGE UP (ref 0–6)
GAS PNL BLDA: SIGNIFICANT CHANGE UP
GAS PNL BLDA: SIGNIFICANT CHANGE UP
GLUCOSE BLDC GLUCOMTR-MCNC: 154 MG/DL — HIGH (ref 70–99)
GLUCOSE BLDC GLUCOMTR-MCNC: 197 MG/DL — HIGH (ref 70–99)
GLUCOSE BLDC GLUCOMTR-MCNC: 211 MG/DL — HIGH (ref 70–99)
GLUCOSE BLDC GLUCOMTR-MCNC: 324 MG/DL — HIGH (ref 70–99)
GLUCOSE SERPL-MCNC: 142 MG/DL — HIGH (ref 70–115)
GLUCOSE SERPL-MCNC: 161 MG/DL — HIGH (ref 70–115)
HCT VFR BLD CALC: 23.6 % — LOW (ref 37–47)
HCT VFR BLD CALC: 24.4 % — LOW (ref 37–47)
HGB BLD-MCNC: 8 G/DL — LOW (ref 12–16)
HGB BLD-MCNC: 8.3 G/DL — LOW (ref 12–16)
HYPOCHROMIA BLD QL: SLIGHT — SIGNIFICANT CHANGE UP
INR BLD: 1.5 RATIO — HIGH (ref 0.88–1.16)
IRON SATN MFR SERPL: 12 % — LOW (ref 14–50)
IRON SATN MFR SERPL: 37 UG/DL — SIGNIFICANT CHANGE UP (ref 37–145)
LYMPHOCYTES # BLD AUTO: 1.1 K/UL — SIGNIFICANT CHANGE UP (ref 1–4.8)
LYMPHOCYTES # BLD AUTO: 10 % — LOW (ref 20–55)
MAGNESIUM SERPL-MCNC: 1.7 MG/DL — SIGNIFICANT CHANGE UP (ref 1.6–2.6)
MCHC RBC-ENTMCNC: 27.8 PG — SIGNIFICANT CHANGE UP (ref 27–31)
MCHC RBC-ENTMCNC: 28.3 PG — SIGNIFICANT CHANGE UP (ref 27–31)
MCHC RBC-ENTMCNC: 33.9 G/DL — SIGNIFICANT CHANGE UP (ref 32–36)
MCHC RBC-ENTMCNC: 34 G/DL — SIGNIFICANT CHANGE UP (ref 32–36)
MCV RBC AUTO: 81.9 FL — SIGNIFICANT CHANGE UP (ref 81–99)
MCV RBC AUTO: 83.3 FL — SIGNIFICANT CHANGE UP (ref 81–99)
MONOCYTES # BLD AUTO: 0.4 K/UL — SIGNIFICANT CHANGE UP (ref 0–0.8)
MONOCYTES NFR BLD AUTO: 3.6 % — SIGNIFICANT CHANGE UP (ref 3–10)
NEUTROPHILS # BLD AUTO: 9.1 K/UL — HIGH (ref 1.8–8)
NEUTROPHILS NFR BLD AUTO: 84.2 % — HIGH (ref 37–73)
NT-PROBNP SERPL-SCNC: 9880 PG/ML — HIGH (ref 0–300)
PHOSPHATE SERPL-MCNC: 2.5 MG/DL — SIGNIFICANT CHANGE UP (ref 2.4–4.7)
PLAT MORPH BLD: NORMAL — SIGNIFICANT CHANGE UP
PLATELET # BLD AUTO: 223 K/UL — SIGNIFICANT CHANGE UP (ref 150–400)
PLATELET # BLD AUTO: 224 K/UL — SIGNIFICANT CHANGE UP (ref 150–400)
POIKILOCYTOSIS BLD QL AUTO: SLIGHT — SIGNIFICANT CHANGE UP
POTASSIUM SERPL-MCNC: 3.9 MMOL/L — SIGNIFICANT CHANGE UP (ref 3.5–5.3)
POTASSIUM SERPL-MCNC: 4 MMOL/L — SIGNIFICANT CHANGE UP (ref 3.5–5.3)
POTASSIUM SERPL-SCNC: 3.9 MMOL/L — SIGNIFICANT CHANGE UP (ref 3.5–5.3)
POTASSIUM SERPL-SCNC: 4 MMOL/L — SIGNIFICANT CHANGE UP (ref 3.5–5.3)
PROTHROM AB SERPL-ACNC: 17.5 SEC — HIGH (ref 10–12.9)
RBC # BLD: 2.88 M/UL — LOW (ref 4.4–5.2)
RBC # BLD: 2.93 M/UL — LOW (ref 4.4–5.2)
RBC # FLD: 20.1 % — HIGH (ref 11–15.6)
RBC # FLD: 20.4 % — HIGH (ref 11–15.6)
RBC BLD AUTO: ABNORMAL
SODIUM SERPL-SCNC: 140 MMOL/L — SIGNIFICANT CHANGE UP (ref 135–145)
SODIUM SERPL-SCNC: 141 MMOL/L — SIGNIFICANT CHANGE UP (ref 135–145)
TARGETS BLD QL SMEAR: SIGNIFICANT CHANGE UP
TIBC SERPL-MCNC: 317 UG/DL — SIGNIFICANT CHANGE UP (ref 220–430)
TRANSFERRIN SERPL-MCNC: 222 MG/DL — SIGNIFICANT CHANGE UP (ref 192–382)
TROPONIN T SERPL-MCNC: 0.08 NG/ML — HIGH (ref 0–0.06)
TROPONIN T SERPL-MCNC: 0.09 NG/ML — HIGH (ref 0–0.06)
TROPONIN T SERPL-MCNC: 0.09 NG/ML — HIGH (ref 0–0.06)
WBC # BLD: 10.6 K/UL — SIGNIFICANT CHANGE UP (ref 4.8–10.8)
WBC # BLD: 10.8 K/UL — SIGNIFICANT CHANGE UP (ref 4.8–10.8)
WBC # FLD AUTO: 10.6 K/UL — SIGNIFICANT CHANGE UP (ref 4.8–10.8)
WBC # FLD AUTO: 10.8 K/UL — SIGNIFICANT CHANGE UP (ref 4.8–10.8)

## 2018-12-17 PROCEDURE — 93010 ELECTROCARDIOGRAM REPORT: CPT

## 2018-12-17 PROCEDURE — 95819 EEG AWAKE AND ASLEEP: CPT | Mod: 26

## 2018-12-17 PROCEDURE — 70450 CT HEAD/BRAIN W/O DYE: CPT | Mod: 26

## 2018-12-17 PROCEDURE — 99233 SBSQ HOSP IP/OBS HIGH 50: CPT

## 2018-12-17 RX ORDER — MAGNESIUM SULFATE 500 MG/ML
2 VIAL (ML) INJECTION ONCE
Qty: 0 | Refills: 0 | Status: COMPLETED | OUTPATIENT
Start: 2018-12-17 | End: 2018-12-17

## 2018-12-17 RX ADMIN — Medication 40 MILLIGRAM(S): at 18:37

## 2018-12-17 RX ADMIN — DULOXETINE HYDROCHLORIDE 60 MILLIGRAM(S): 30 CAPSULE, DELAYED RELEASE ORAL at 13:49

## 2018-12-17 RX ADMIN — CARVEDILOL PHOSPHATE 25 MILLIGRAM(S): 80 CAPSULE, EXTENDED RELEASE ORAL at 18:37

## 2018-12-17 RX ADMIN — ISOSORBIDE MONONITRATE 60 MILLIGRAM(S): 60 TABLET, EXTENDED RELEASE ORAL at 13:49

## 2018-12-17 RX ADMIN — Medication 4: at 16:40

## 2018-12-17 RX ADMIN — GABAPENTIN 100 MILLIGRAM(S): 400 CAPSULE ORAL at 05:47

## 2018-12-17 RX ADMIN — Medication 2: at 12:29

## 2018-12-17 RX ADMIN — Medication 650 MILLIGRAM(S): at 22:48

## 2018-12-17 RX ADMIN — CARVEDILOL PHOSPHATE 25 MILLIGRAM(S): 80 CAPSULE, EXTENDED RELEASE ORAL at 05:47

## 2018-12-17 RX ADMIN — Medication 50 GRAM(S): at 10:29

## 2018-12-17 RX ADMIN — PANTOPRAZOLE SODIUM 40 MILLIGRAM(S): 20 TABLET, DELAYED RELEASE ORAL at 05:47

## 2018-12-17 RX ADMIN — Medication 1: at 09:08

## 2018-12-17 RX ADMIN — Medication 100 MILLIGRAM(S): at 05:47

## 2018-12-17 RX ADMIN — CLOPIDOGREL BISULFATE 75 MILLIGRAM(S): 75 TABLET, FILM COATED ORAL at 13:49

## 2018-12-17 RX ADMIN — Medication 650 MILLIGRAM(S): at 22:28

## 2018-12-17 RX ADMIN — Medication 81 MILLIGRAM(S): at 13:50

## 2018-12-17 RX ADMIN — Medication 40 MILLIGRAM(S): at 05:47

## 2018-12-17 RX ADMIN — INSULIN GLARGINE 7 UNIT(S): 100 INJECTION, SOLUTION SUBCUTANEOUS at 22:29

## 2018-12-17 RX ADMIN — Medication 100 MILLIGRAM(S): at 18:37

## 2018-12-17 RX ADMIN — GABAPENTIN 100 MILLIGRAM(S): 400 CAPSULE ORAL at 13:49

## 2018-12-17 RX ADMIN — GABAPENTIN 100 MILLIGRAM(S): 400 CAPSULE ORAL at 22:28

## 2018-12-17 RX ADMIN — ATORVASTATIN CALCIUM 40 MILLIGRAM(S): 80 TABLET, FILM COATED ORAL at 22:28

## 2018-12-17 NOTE — CHART NOTE - NSCHARTNOTEFT_GEN_A_CORE
Rapid response/Code blue PGY 1 Note    Rapid response team called because the patient was unresponsive   Patient was seen and examined at the bedside by the rapid response team. Nursing team states the patient was responsive, was given 1 unit Insulin, tried to move her up the bed and then she was unresponsive to the aid.   At 09:18 CPR was started  At 09:20 Pulse check without response, CPR resumed  At 09:21 1 mg IV epinephrine was given  At 09:22 Pulse check, pt verbal, CPR resumed for 30 sec more, then stopped with Sinus rhythym on monitor  Pt c/o Lt shoulder pain, no acute chest pain, SOB, palpitations. Pt wanted to update health care proxy  about the plan.    Allergies Accupril (Other)    PAST MEDICAL & SURGICAL HISTORY:  Herniated disc, cervical  PAD (peripheral artery disease)  Legally blind  History of peripheral vascular disease  History of retinal detachment  History of CEA (carotid endarterectomy): Right  Hyperlipidemia  Glaucoma  Hypertension  Diabetes  S/P CABG x 1  After cataract  Uveitic glaucoma of both eyes  Detached retina  Atherosclerosis of right carotid artery   delivery delivered      Vital Signs Last 24 Hrs  VS: HR 69; /75; SpO2 99% 5 L NC,     GENERAL: The patient is awake and alert in no apparent distress.   HEENT: Head is normocephalic and atraumatic. Extraocular muscles are intact. Rt eye cataracts; Lt eye wnl  LUNGS: Clear to auscultation BL without wheezing, rales or rhonchi; respirations unlabored  HEART: Regular rate and rhythm ,+S1/+S2, no murmurs, rubs, gallops  ABDOMEN: Soft, nontender, and nondistended, no rebound, guarding rigidity, bowel sounds in all 4 quadrants  VASCULAR: Radial and Dorsal pedal pulses palpable BL  NEUROLOGIC: Grossly intact, no acute defecits  PSYCH: not agitated    12-16 @ 07:01  -  - @ 07:00  --------------------------------------------------------  IN: 360 mL / OUT: 0 mL / NET: 360 mL                              8.0    10.6  )-----------( 224      ( 17 Dec 2018 08:14 )             23.6     12-17    141  |  92<L>  |  61.0<H>  ----------------------------<  142<H>  3.9   |  43.0<H>  |  1.43<H>    Ca    8.8      17 Dec 2018 08:14  Phos  2.5       Mg     1.7           ABG - ( 16 Dec 2018 09:29 )  pH, Arterial: 7.39  pH, Blood: x     /  pCO2: 66    /  pO2: 55    / HCO3: 37    / Base Excess: 13.1  /  SaO2: 86        PT/INR - ( 17 Dec 2018 03:48 )   PT: 17.5 sec;   INR: 1.50 ratio         PTT - ( 17 Dec 2018 03:48 )  PTT:33.8 sec    MEDICATIONS  (STANDING):  aspirin  chewable 81 milliGRAM(s) Oral daily  atorvastatin 40 milliGRAM(s) Oral at bedtime  carvedilol 25 milliGRAM(s) Oral every 12 hours  clopidogrel Tablet 75 milliGRAM(s) Oral daily  dextrose 5%. 1000 milliLiter(s) (50 mL/Hr) IV Continuous <Continuous>  dextrose 50% Injectable 12.5 Gram(s) IV Push once  dextrose 50% Injectable 25 Gram(s) IV Push once  dextrose 50% Injectable 25 Gram(s) IV Push once  docusate sodium 100 milliGRAM(s) Oral two times a day  DULoxetine 60 milliGRAM(s) Oral daily  epoetin sara Injectable 53842 Unit(s) SubCutaneous <User Schedule>  gabapentin 100 milliGRAM(s) Oral three times a day  insulin glargine Injectable (LANTUS) 7 Unit(s) SubCutaneous at bedtime  insulin lispro (HumaLOG) corrective regimen sliding scale   SubCutaneous three times a day before meals  isosorbide   mononitrate ER Tablet (IMDUR) 60 milliGRAM(s) Oral daily  metolazone 2.5 milliGRAM(s) Oral daily  pantoprazole    Tablet 40 milliGRAM(s) Oral before breakfast  torsemide 40 milliGRAM(s) Oral two times a day    MEDICATIONS  (PRN):  acetaminophen   Tablet .. 650 milliGRAM(s) Oral every 6 hours PRN Temp greater or equal to 38C (100.4F), Mild Pain (1 - 3)  bisacodyl Suppository 10 milliGRAM(s) Rectal daily PRN Constipation  dextrose 40% Gel 15 Gram(s) Oral once PRN Blood Glucose LESS THAN 70 milliGRAM(s)/deciliter  glucagon  Injectable 1 milliGRAM(s) IntraMuscular once PRN Glucose LESS THAN 70 milligrams/deciliter  lactulose Syrup 10 Gram(s) Oral three times a day PRN colnstipaiton      Assessment- Rapid Response called for unresponsiveness on a 64 y/o female with CAD s/p CABG and PCI, PAD s/p balloon angioplasty, diabetes mellitus, hypertension, hyperlipidemia admitted with complaints of worsening exertional shortness of breath.  Plan   - ROSC after CPR x 2 and Epi x 1  - transfer in level of care to ICU per Dr. Anne  - f/u 12 lead EKG  - Mg 2g x 1 ordered  - continue care per primary team  - SROC Dr. Talamantes, ICU Dr. Anen, PMD Dr. Parish aware of the plan  - Debriefing with the medical team done at bedside.

## 2018-12-17 NOTE — PROGRESS NOTE ADULT - ASSESSMENT
KEITH- slight rise likely 2/2 diuretics  would cont torsemide and metolazone  May need to tolerate some degree of azotemia  RR w CPR ROSC being trf to MICU    CHF (EF= 70%; mild pulm HTN)==> diastolic dysfunction  Renal sono repeated and unremarkable  CHF now compensated  - daily weights    Will follow

## 2018-12-17 NOTE — PROGRESS NOTE ADULT - ASSESSMENT
Echo reveals mild pulm HTN, mod MR  OHS/KYREE/Diastolic HF with hypercarbia and recurrent effusion  CAD  Restrictive pulmonary impairment with chronic hypercarbic respiratory failure with progressive impairment  Vasovagal syncopy  Possible aspiration  Tx to ICU    Plan    [place] as able  Optimize cardiac function  Nocturnal and prn Bipap  02 as needed - HiFlo if needed though not required currently  Pleurex drainage if effusion continues to recur  NIV with Trilogy on discharge to lower PC02 and to prevent recurrent hospitalization  Bipap was considered but rejected due to the progressive nature of the patient's condition   Management per ICU for now

## 2018-12-17 NOTE — PROGRESS NOTE ADULT - SUBJECTIVE AND OBJECTIVE BOX
PULMONARY PROGRESS NOTE      KUN ROCHA  MRN-[id number]    Patient is a 63y old  Female who presents with a chief complaint of Shortness of breath/Syncope (17 Dec 2018 18:06)      INTERVAL HPI/OVERNIGHT EVENTS:    63F with KYREE/OHV, Morbi dobesity, Chronic Hypercapnia, Severe Pulmonary HTN, CAD, PVD, S/p CABG, on Home Bipap (Non-compliant) admitted for Syncope. Noted to have a witnessed syncopal event this morning. UNclear if lost pulse. CPR initiated. Upon my arrival CPR was in progress. Pulse detected and rhythm noted on monitor as Sinus Tach.  BP noted to be 170.  Upon review of telemetry strips, the patient did not lose her rhythm. BG normal. O2 sats 95%. EKG without ischemic change and no complaints of Chest pain. The patient re-gained consciousness and is now A+O x 3. ABG following the event showed and compensated respiratory acidosis, with severe hypercapnia. This was immediately following the patient receiving BMV by the RT. Of note, the patient refused wearing her Bipap overnight. She is now significantly improved and does not fully recall the event.    MEDICATIONS  (STANDING):  aspirin  chewable 81 milliGRAM(s) Oral daily  atorvastatin 40 milliGRAM(s) Oral at bedtime  carvedilol 25 milliGRAM(s) Oral every 12 hours  clopidogrel Tablet 75 milliGRAM(s) Oral daily  dextrose 5%. 1000 milliLiter(s) (50 mL/Hr) IV Continuous <Continuous>  dextrose 50% Injectable 12.5 Gram(s) IV Push once  dextrose 50% Injectable 25 Gram(s) IV Push once  dextrose 50% Injectable 25 Gram(s) IV Push once  docusate sodium 100 milliGRAM(s) Oral two times a day  DULoxetine 60 milliGRAM(s) Oral daily  epoetin sara Injectable 04259 Unit(s) SubCutaneous <User Schedule>  gabapentin 100 milliGRAM(s) Oral three times a day  insulin glargine Injectable (LANTUS) 7 Unit(s) SubCutaneous at bedtime  insulin lispro (HumaLOG) corrective regimen sliding scale   SubCutaneous three times a day before meals  isosorbide   mononitrate ER Tablet (IMDUR) 60 milliGRAM(s) Oral daily  metolazone 2.5 milliGRAM(s) Oral daily  pantoprazole    Tablet 40 milliGRAM(s) Oral before breakfast  torsemide 40 milliGRAM(s) Oral two times a day      MEDICATIONS  (PRN):  acetaminophen   Tablet .. 650 milliGRAM(s) Oral every 6 hours PRN Temp greater or equal to 38C (100.4F), Mild Pain (1 - 3)  bisacodyl Suppository 10 milliGRAM(s) Rectal daily PRN Constipation  dextrose 40% Gel 15 Gram(s) Oral once PRN Blood Glucose LESS THAN 70 milliGRAM(s)/deciliter  glucagon  Injectable 1 milliGRAM(s) IntraMuscular once PRN Glucose LESS THAN 70 milligrams/deciliter  lactulose Syrup 10 Gram(s) Oral three times a day PRN colnstipaiton      Allergies    Accupril (Other)    Intolerances        PAST MEDICAL & SURGICAL HISTORY:  Herniated disc, cervical  PAD (peripheral artery disease)  Legally blind  History of peripheral vascular disease  History of retinal detachment  History of CEA (carotid endarterectomy): Right  Hyperlipidemia  Glaucoma  Hypertension  Diabetes  S/P CABG x 1  After cataract  Uveitic glaucoma of both eyes  Detached retina  Atherosclerosis of right carotid artery   delivery delivered        REVIEW OF SYSTEMS:    CONSTITUTIONAL:  No distress    HEENT:  Eyes:  No diplopia or blurred vision. ENT:  No earache, sore throat or runny nose.    CARDIOVASCULAR:  No pressure, squeezing, tightness, heaviness or aching about the chest; no palpitations.    RESPIRATORY:  No cough, improved shortness of breath, PND and orthopnea. Mild SOBOE    GASTROINTESTINAL:  No nausea, vomiting or diarrhea.    GENITOURINARY:  No dysuria, frequency or urgency.    NEUROLOGIC:  No paresthesias, fasciculations, seizures or weakness.    PSYCHIATRIC:  No disorder of thought or mood.    Vital Signs Last 24 Hrs  T(C): 37.1 (17 Dec 2018 18:05), Max: 37.1 (17 Dec 2018 18:05)  T(F): 98.7 (17 Dec 2018 18:05), Max: 98.7 (17 Dec 2018 18:05)  HR: 69 (17 Dec 2018 19:00) (62 - 69)  BP: 140/61 (17 Dec 2018 19:00) (110/53 - 145/63)  BP(mean): 88 (17 Dec 2018 19:00) (77 - 93)  RR: 15 (17 Dec 2018 19:00) (15 - 27)  SpO2: 100% (17 Dec 2018 19:00) (93% - 100%)    PHYSICAL EXAMINATION:    GENERAL: The patient is awake and alert in no apparent distress. Obese    HEENT: Head is normocephalic and atraumatic. Extraocular muscles are intact. Mucous membranes are moist.    NECK: Supple.    LUNGS: Decreased BS b/l otherwise clear to auscultation without wheezing, rales or rhonchi; respirations unlabored    HEART: Regular rate and rhythm without murmur.    ABDOMEN: Soft, nontender, and nondistended.      EXTREMITIES: Without any cyanosis, clubbing, rash, lesions or edema.    NEUROLOGIC: Grossly intact.    LABS:                        8.0    10.6  )-----------( 224      ( 17 Dec 2018 08:14 )             23.6         141  |  92<L>  |  61.0<H>  ----------------------------<  142<H>  3.9   |  43.0<H>  |  1.43<H>    Ca    8.8      17 Dec 2018 08:14  Phos  2.5       Mg     1.7           PT/INR - ( 17 Dec 2018 03:48 )   PT: 17.5 sec;   INR: 1.50 ratio         PTT - ( 17 Dec 2018 03:48 )  PTT:33.8 sec    ABG - ( 17 Dec 2018 14:18 )  pH, Arterial: 7.45  pH, Blood: x     /  pCO2: 70    /  pO2: 62    / HCO3: 46    / Base Excess: 21.4  /  SaO2: 91                CARDIAC MARKERS ( 17 Dec 2018 08:15 )  x     / 0.08 ng/mL / 40 U/L / x     / x      CARDIAC MARKERS ( 17 Dec 2018 08:14 )  x     / 0.09 ng/mL / x     / x     / x      CARDIAC MARKERS ( 17 Dec 2018 03:48 )  x     / 0.09 ng/mL / 45 U/L / x     / x            Serum Pro-Brain Natriuretic Peptide: 9880 pg/mL (18 @ 08:15)      RADIOLOGY & ADDITIONAL STUDIES:       EXAM:  CT BRAIN                          PROCEDURE DATE:  2018          INTERPRETATION:  CLINICAL HISTORY: Syncope    COMPARISON: CT head dated 2011    TECHNIQUE: Noncontrast CT of the head. Multiplanar reformations are   submitted.    FINDINGS:  Old left thalamic lacunar infarct. Old right thalamic lacunar infarct.  There is periventricular and subcortical white matter hypodensity without   mass effect, nonspecific, likely representing moderate chronic   microvascular ischemic changes. There is no compelling evidence for an   acute transcortical infarction. There is no evidence of mass, mass   effect, midline shift or extra-axial fluid collection. The lateral   ventricles and cortical sulci are age-appropriate in size and   configuration.     Status post silicone injection of the bilateral ocular globes. The   patient is status post bilateral ocular lens replacement surgery. Status   post bilateral ocular globe banding. The orbits, mastoid air cells and   visualized paranasal sinuses are otherwise unremarkable. The calvarium is   intact.    IMPRESSION:  Moderate chronic microvascular changes without evidence of   an acute transcortical infarction or hemorrhage. MR is a more sensitive   imaging modality for the evaluation of an acute infarction.       LIZA JIMENEZ M.D., ATTENDING RADIOLOGIST  This document has been electronically signed. Dec 17 2018  1:13PM           EXAM:  XR CHEST PORTABLE IMMED 1V                          PROCEDURE DATE:  2018          INTERPRETATION:  Chest radiograph (one view)     CPT 63745    CLINICAL INFORMATION:       Moderate shortness of breath since today.    TECHNIQUE:  Single frontal view of the chest was obtained.    FINDINGS:  Prior study dated 2018 was available for review.    The lungs demonstrate a moderate-sized right pleural effusion. This is   shown an increase in size from prior study. Underlying infiltrate and/or   atelectasis cannot be excluded. The heart is prominent in size.  Patient   is status post median sternotomy and coronary artery bypass graft.      IMPRESSION: Prominent cardiac silhouette.   Increasing right pleural   effusion when compared to prior study dated 2018.                 MADAN CRAVEN M.D., ATTENDING RADIOLOGIST  This document has been electronically signed. Dec 16 2018 12:01PM        ECHO:      Summary:   1. Left ventricular ejection fraction, by visual estimation, is 65 to   70%.   2. Normal global left ventricular systolic function.   3. There is mild concentric left ventricular hypertrophy.   4. Moderately enlarged right ventricle.   5. Moderately reduced RV systolic function.   6. There is no evidence of pericardial effusion.   7. Moderate mitral valve regurgitation.   8. Thickening of the anterior and posterior mitral valve leaflets.   9. Moderate tricuspid regurgitation.  10. Sclerotic aortic valve with normal opening.  11. Trace pulmonic valve regurgitation.  12. Estimated pulmonary artery systolic pressure is 45.7 mmHg assuming a   right atrial pressure of 8 mmHg, which is consistent with mild pulmonary   hypertension.    [id number] Phoenxi Alvarado MD, Electronically signed on 2018 at 8:26:51   PM

## 2018-12-17 NOTE — PROGRESS NOTE ADULT - SUBJECTIVE AND OBJECTIVE BOX
NEPHROLOGY INTERVAL HPI/OVERNIGHT EVENTS:    Examined earlier  RR noted    MEDICATIONS  (STANDING):  aspirin  chewable 81 milliGRAM(s) Oral daily  atorvastatin 40 milliGRAM(s) Oral at bedtime  carvedilol 25 milliGRAM(s) Oral every 12 hours  clopidogrel Tablet 75 milliGRAM(s) Oral daily  dextrose 5%. 1000 milliLiter(s) (50 mL/Hr) IV Continuous <Continuous>  dextrose 50% Injectable 12.5 Gram(s) IV Push once  dextrose 50% Injectable 25 Gram(s) IV Push once  dextrose 50% Injectable 25 Gram(s) IV Push once  docusate sodium 100 milliGRAM(s) Oral two times a day  DULoxetine 60 milliGRAM(s) Oral daily  epoetin sara Injectable 56291 Unit(s) SubCutaneous <User Schedule>  gabapentin 100 milliGRAM(s) Oral three times a day  insulin glargine Injectable (LANTUS) 7 Unit(s) SubCutaneous at bedtime  insulin lispro (HumaLOG) corrective regimen sliding scale   SubCutaneous three times a day before meals  isosorbide   mononitrate ER Tablet (IMDUR) 60 milliGRAM(s) Oral daily  metolazone 2.5 milliGRAM(s) Oral daily  pantoprazole    Tablet 40 milliGRAM(s) Oral before breakfast  torsemide 40 milliGRAM(s) Oral two times a day    MEDICATIONS  (PRN):  acetaminophen   Tablet .. 650 milliGRAM(s) Oral every 6 hours PRN Temp greater or equal to 38C (100.4F), Mild Pain (1 - 3)  bisacodyl Suppository 10 milliGRAM(s) Rectal daily PRN Constipation  dextrose 40% Gel 15 Gram(s) Oral once PRN Blood Glucose LESS THAN 70 milliGRAM(s)/deciliter  glucagon  Injectable 1 milliGRAM(s) IntraMuscular once PRN Glucose LESS THAN 70 milligrams/deciliter  lactulose Syrup 10 Gram(s) Oral three times a day PRN colnstipaiton      Allergies    Accupril (Other)    Intolerances        Vital Signs Last 24 Hrs  T(C): 36.7 (17 Dec 2018 04:42), Max: 36.8 (16 Dec 2018 16:13)  T(F): 98 (17 Dec 2018 04:42), Max: 98.3 (16 Dec 2018 16:13)  HR: 65 (17 Dec 2018 16:00) (62 - 75)  BP: 125/60 (17 Dec 2018 16:00) (110/53 - 145/63)  BP(mean): 87 (17 Dec 2018 16:00) (77 - 93)  RR: 19 (17 Dec 2018 16:00) (18 - 27)  SpO2: 100% (17 Dec 2018 16:00) (98% - 100%)  Daily     Daily     PHYSICAL EXAM:  GENERAL: NAD  HEENT: same  NECK: Supple, +JVD  NERVOUS SYSTEM:  Alert & Oriented in bed  CHEST/LUNG: Diminished BS right side, no wheezes  HEART: Regular rate and rhythm; no rub  ABDOMEN: Soft, Nontender,+ distention; +BS; + flank edema  EXTREMITIES:   leg edema better    LABS:                        8.0    10.6  )-----------( 224      ( 17 Dec 2018 08:14 )             23.6     12-17    141  |  92<L>  |  61.0<H>  ----------------------------<  142<H>  3.9   |  43.0<H>  |  1.43<H>    Ca    8.8      17 Dec 2018 08:14  Phos  2.5     12-17  Mg     1.7     12-17      PT/INR - ( 17 Dec 2018 03:48 )   PT: 17.5 sec;   INR: 1.50 ratio         PTT - ( 17 Dec 2018 03:48 )  PTT:33.8 sec    Magnesium, Serum: 1.7 mg/dL (12-17 @ 03:48)  Phosphorus Level, Serum: 2.5 mg/dL (12-17 @ 03:48)    ABG - ( 17 Dec 2018 14:18 )  pH, Arterial: 7.45  pH, Blood: x     /  pCO2: 70    /  pO2: 62    / HCO3: 46    / Base Excess: 21.4  /  SaO2: 91                    RADIOLOGY & ADDITIONAL TESTS:

## 2018-12-18 LAB
ANION GAP SERPL CALC-SCNC: 8 MMOL/L — SIGNIFICANT CHANGE UP (ref 5–17)
BUN SERPL-MCNC: 55 MG/DL — HIGH (ref 8–20)
CALCIUM SERPL-MCNC: 8.6 MG/DL — SIGNIFICANT CHANGE UP (ref 8.6–10.2)
CHLORIDE SERPL-SCNC: 91 MMOL/L — LOW (ref 98–107)
CO2 SERPL-SCNC: 43 MMOL/L — HIGH (ref 22–29)
CREAT SERPL-MCNC: 1.36 MG/DL — HIGH (ref 0.5–1.3)
GLUCOSE BLDC GLUCOMTR-MCNC: 208 MG/DL — HIGH (ref 70–99)
GLUCOSE BLDC GLUCOMTR-MCNC: 241 MG/DL — HIGH (ref 70–99)
GLUCOSE BLDC GLUCOMTR-MCNC: 265 MG/DL — HIGH (ref 70–99)
GLUCOSE BLDC GLUCOMTR-MCNC: 312 MG/DL — HIGH (ref 70–99)
GLUCOSE SERPL-MCNC: 173 MG/DL — HIGH (ref 70–115)
HCT VFR BLD CALC: 24.1 % — LOW (ref 37–47)
HGB BLD-MCNC: 8 G/DL — LOW (ref 12–16)
MAGNESIUM SERPL-MCNC: 1.9 MG/DL — SIGNIFICANT CHANGE UP (ref 1.6–2.6)
MCHC RBC-ENTMCNC: 27.6 PG — SIGNIFICANT CHANGE UP (ref 27–31)
MCHC RBC-ENTMCNC: 33.2 G/DL — SIGNIFICANT CHANGE UP (ref 32–36)
MCV RBC AUTO: 83.1 FL — SIGNIFICANT CHANGE UP (ref 81–99)
PHOSPHATE SERPL-MCNC: 2.2 MG/DL — LOW (ref 2.4–4.7)
PLATELET # BLD AUTO: 226 K/UL — SIGNIFICANT CHANGE UP (ref 150–400)
POTASSIUM SERPL-MCNC: 3.8 MMOL/L — SIGNIFICANT CHANGE UP (ref 3.5–5.3)
POTASSIUM SERPL-SCNC: 3.8 MMOL/L — SIGNIFICANT CHANGE UP (ref 3.5–5.3)
RBC # BLD: 2.9 M/UL — LOW (ref 4.4–5.2)
RBC # FLD: 20.5 % — HIGH (ref 11–15.6)
SODIUM SERPL-SCNC: 142 MMOL/L — SIGNIFICANT CHANGE UP (ref 135–145)
WBC # BLD: 6.8 K/UL — SIGNIFICANT CHANGE UP (ref 4.8–10.8)
WBC # FLD AUTO: 6.8 K/UL — SIGNIFICANT CHANGE UP (ref 4.8–10.8)

## 2018-12-18 PROCEDURE — 99232 SBSQ HOSP IP/OBS MODERATE 35: CPT

## 2018-12-18 RX ORDER — POTASSIUM CHLORIDE 20 MEQ
40 PACKET (EA) ORAL ONCE
Qty: 0 | Refills: 0 | Status: COMPLETED | OUTPATIENT
Start: 2018-12-18 | End: 2018-12-18

## 2018-12-18 RX ORDER — MAGNESIUM SULFATE 500 MG/ML
2 VIAL (ML) INJECTION ONCE
Qty: 0 | Refills: 0 | Status: COMPLETED | OUTPATIENT
Start: 2018-12-18 | End: 2018-12-18

## 2018-12-18 RX ORDER — POTASSIUM PHOSPHATE, MONOBASIC POTASSIUM PHOSPHATE, DIBASIC 236; 224 MG/ML; MG/ML
15 INJECTION, SOLUTION INTRAVENOUS ONCE
Qty: 0 | Refills: 0 | Status: COMPLETED | OUTPATIENT
Start: 2018-12-18 | End: 2018-12-18

## 2018-12-18 RX ADMIN — Medication 2: at 07:52

## 2018-12-18 RX ADMIN — CARVEDILOL PHOSPHATE 25 MILLIGRAM(S): 80 CAPSULE, EXTENDED RELEASE ORAL at 17:31

## 2018-12-18 RX ADMIN — GABAPENTIN 100 MILLIGRAM(S): 400 CAPSULE ORAL at 22:18

## 2018-12-18 RX ADMIN — Medication 100 MILLIGRAM(S): at 17:31

## 2018-12-18 RX ADMIN — Medication 2: at 11:02

## 2018-12-18 RX ADMIN — GABAPENTIN 100 MILLIGRAM(S): 400 CAPSULE ORAL at 06:53

## 2018-12-18 RX ADMIN — Medication 40 MILLIGRAM(S): at 06:53

## 2018-12-18 RX ADMIN — DULOXETINE HYDROCHLORIDE 60 MILLIGRAM(S): 30 CAPSULE, DELAYED RELEASE ORAL at 11:05

## 2018-12-18 RX ADMIN — CARVEDILOL PHOSPHATE 25 MILLIGRAM(S): 80 CAPSULE, EXTENDED RELEASE ORAL at 06:54

## 2018-12-18 RX ADMIN — CLOPIDOGREL BISULFATE 75 MILLIGRAM(S): 75 TABLET, FILM COATED ORAL at 11:03

## 2018-12-18 RX ADMIN — Medication 50 GRAM(S): at 10:53

## 2018-12-18 RX ADMIN — Medication 40 MILLIGRAM(S): at 17:31

## 2018-12-18 RX ADMIN — Medication 81 MILLIGRAM(S): at 11:07

## 2018-12-18 RX ADMIN — Medication 100 MILLIGRAM(S): at 06:53

## 2018-12-18 RX ADMIN — POTASSIUM PHOSPHATE, MONOBASIC POTASSIUM PHOSPHATE, DIBASIC 62.5 MILLIMOLE(S): 236; 224 INJECTION, SOLUTION INTRAVENOUS at 10:55

## 2018-12-18 RX ADMIN — ATORVASTATIN CALCIUM 40 MILLIGRAM(S): 80 TABLET, FILM COATED ORAL at 22:18

## 2018-12-18 RX ADMIN — INSULIN GLARGINE 7 UNIT(S): 100 INJECTION, SOLUTION SUBCUTANEOUS at 22:18

## 2018-12-18 RX ADMIN — Medication 40 MILLIEQUIVALENT(S): at 17:31

## 2018-12-18 RX ADMIN — Medication 3: at 17:30

## 2018-12-18 RX ADMIN — GABAPENTIN 100 MILLIGRAM(S): 400 CAPSULE ORAL at 13:34

## 2018-12-18 RX ADMIN — Medication 650 MILLIGRAM(S): at 18:20

## 2018-12-18 RX ADMIN — ISOSORBIDE MONONITRATE 60 MILLIGRAM(S): 60 TABLET, EXTENDED RELEASE ORAL at 11:03

## 2018-12-18 RX ADMIN — PANTOPRAZOLE SODIUM 40 MILLIGRAM(S): 20 TABLET, DELAYED RELEASE ORAL at 06:53

## 2018-12-18 RX ADMIN — Medication 650 MILLIGRAM(S): at 17:31

## 2018-12-18 NOTE — PROGRESS NOTE ADULT - ASSESSMENT
KEITH- cr electrolytes stable  would cont torsemide and metolazone  May need to tolerate some degree of azotemia  yest ->RR w CPR ROSC being trf to MICU-> down grade to 4CTU    CHF (EF= 70%; mild pulm HTN)==> diastolic dysfunction  Renal sono no hydro  CHF now compensated  - daily weights/ I&Os    Will follow

## 2018-12-18 NOTE — PROGRESS NOTE ADULT - SUBJECTIVE AND OBJECTIVE BOX
PULMONARY PROGRESS NOTE      KUN ROCHA  MRN-[id number]    Patient is a 63y old  Female who presents with a chief complaint of Shortness of breath (18 Dec 2018 15:23)      INTERVAL HPI/OVERNIGHT EVENTS:    Patient is awake and alert  Comfortable  Tx'd out of ICU  Willing to use AVAPS/BiPAP    MEDICATIONS  (STANDING):  aspirin  chewable 81 milliGRAM(s) Oral daily  atorvastatin 40 milliGRAM(s) Oral at bedtime  carvedilol 25 milliGRAM(s) Oral every 12 hours  clopidogrel Tablet 75 milliGRAM(s) Oral daily  dextrose 5%. 1000 milliLiter(s) (50 mL/Hr) IV Continuous <Continuous>  dextrose 50% Injectable 12.5 Gram(s) IV Push once  dextrose 50% Injectable 25 Gram(s) IV Push once  dextrose 50% Injectable 25 Gram(s) IV Push once  docusate sodium 100 milliGRAM(s) Oral two times a day  DULoxetine 60 milliGRAM(s) Oral daily  epoetin sara Injectable 50074 Unit(s) SubCutaneous <User Schedule>  gabapentin 100 milliGRAM(s) Oral three times a day  insulin glargine Injectable (LANTUS) 7 Unit(s) SubCutaneous at bedtime  insulin lispro (HumaLOG) corrective regimen sliding scale   SubCutaneous three times a day before meals  isosorbide   mononitrate ER Tablet (IMDUR) 60 milliGRAM(s) Oral daily  metolazone 2.5 milliGRAM(s) Oral daily  pantoprazole    Tablet 40 milliGRAM(s) Oral before breakfast  potassium chloride    Tablet ER 40 milliEquivalent(s) Oral once  torsemide 40 milliGRAM(s) Oral two times a day      MEDICATIONS  (PRN):  acetaminophen   Tablet .. 650 milliGRAM(s) Oral every 6 hours PRN Temp greater or equal to 38C (100.4F), Mild Pain (1 - 3)  bisacodyl Suppository 10 milliGRAM(s) Rectal daily PRN Constipation  dextrose 40% Gel 15 Gram(s) Oral once PRN Blood Glucose LESS THAN 70 milliGRAM(s)/deciliter  glucagon  Injectable 1 milliGRAM(s) IntraMuscular once PRN Glucose LESS THAN 70 milligrams/deciliter  lactulose Syrup 10 Gram(s) Oral three times a day PRN colnstipaiton      Allergies    Accupril (Other)    Intolerances        PAST MEDICAL & SURGICAL HISTORY:  Herniated disc, cervical  PAD (peripheral artery disease)  Legally blind  History of peripheral vascular disease  History of retinal detachment  History of CEA (carotid endarterectomy): Right  Hyperlipidemia  Glaucoma  Hypertension  Diabetes  S/P CABG x 1  After cataract  Uveitic glaucoma of both eyes  Detached retina  Atherosclerosis of right carotid artery   delivery delivered        REVIEW OF SYSTEMS:    CONSTITUTIONAL:  No distress    HEENT:  Eyes:  No diplopia or blurred vision. ENT:  No earache, sore throat or runny nose.    CARDIOVASCULAR:  No pressure, squeezing, tightness, heaviness or aching about the chest; no palpitations.    RESPIRATORY:  No cough, improved shortness of breath, no PND or orthopnea. Mild SOBOE    GASTROINTESTINAL:  No nausea, vomiting or diarrhea.    GENITOURINARY:  No dysuria, frequency or urgency.    NEUROLOGIC:  No paresthesias, fasciculations, seizures or weakness.    PSYCHIATRIC:  No disorder of thought or mood.    Vital Signs Last 24 Hrs  T(C): 36.7 (18 Dec 2018 14:30), Max: 37.1 (17 Dec 2018 18:05)  T(F): 98.1 (18 Dec 2018 14:30), Max: 98.7 (17 Dec 2018 18:05)  HR: 67 (18 Dec 2018 14:30) (62 - 69)  BP: 120/58 (18 Dec 2018 14:30) (101/53 - 150/68)  BP(mean): 82 (18 Dec 2018 13:00) (67 - 98)  RR: 20 (18 Dec 2018 14:30) (12 - 37)  SpO2: 97% (18 Dec 2018 14:30) (92% - 100%)    PHYSICAL EXAMINATION:    GENERAL: The patient is awake and alert in no apparent distress.     HEENT: Head is normocephalic and atraumatic. Extraocular muscles are intact. Mucous membranes are moist.    NECK: Supple.    LUNGS: Clear to auscultation without wheezing, rales or rhonchi; respirations unlabored    HEART: Regular rate and rhythm with mild murmur.    ABDOMEN: Soft, nontender, and nondistended.      EXTREMITIES: Without any cyanosis, clubbing, rash, with trace edema.    NEUROLOGIC: Grossly intact.    LABS:                        8.0    6.8   )-----------( 226      ( 18 Dec 2018 06:12 )             24.1     12-18    142  |  91<L>  |  55.0<H>  ----------------------------<  173<H>  3.8   |  43.0<H>  |  1.36<H>    Ca    8.6      18 Dec 2018 06:12  Phos  2.2       Mg     1.9     18      PT/INR - ( 17 Dec 2018 03:48 )   PT: 17.5 sec;   INR: 1.50 ratio         PTT - ( 17 Dec 2018 03:48 )  PTT:33.8 sec    ABG - ( 17 Dec 2018 14:18 )  pH, Arterial: 7.45  pH, Blood: x     /  pCO2: 70    /  pO2: 62    / HCO3: 46    / Base Excess: 21.4  /  SaO2: 91                CARDIAC MARKERS ( 17 Dec 2018 08:15 )  x     / 0.08 ng/mL / 40 U/L / x     / x      CARDIAC MARKERS ( 17 Dec 2018 08:14 )  x     / 0.09 ng/mL / x     / x     / x      CARDIAC MARKERS ( 17 Dec 2018 03:48 )  x     / 0.09 ng/mL / 45 U/L / x     / x            Serum Pro-Brain Natriuretic Peptide: 9880 pg/mL (18 @ 08:15)      RADIOLOGY & ADDITIONAL STUDIES:       EXAM:  XR CHEST PORTABLE IMMED 1V                          PROCEDURE DATE:  2018          INTERPRETATION:  Chest radiograph (one view)     CPT 79323    CLINICAL INFORMATION:       Moderate shortness of breath since today.    TECHNIQUE:  Single frontal view of the chest was obtained.    FINDINGS:  Prior study dated 2018 was available for review.    The lungs demonstrate a moderate-sized right pleural effusion. This is   shown an increase in size from prior study. Underlying infiltrate and/or   atelectasis cannot be excluded. The heart is prominent in size.  Patient   is status post median sternotomy and coronary artery bypass graft.      IMPRESSION: Prominent cardiac silhouette.   Increasing right pleural   effusion when compared to prior study dated 2018.           MADAN CRAVEN M.D., ATTENDING RADIOLOGIST  This document has been electronically signed. Dec 16 2018 12:01PM            ECHO:      Summary:   1. Left ventricular ejection fraction, by visual estimation, is 65 to   70%.   2. Normal global left ventricular systolic function.   3. There is mild concentric left ventricular hypertrophy.   4. Moderately enlarged right ventricle.   5. Moderately reduced RV systolic function.   6. There is no evidence of pericardial effusion.   7. Moderate mitral valve regurgitation.   8. Thickening of the anterior and posterior mitral valve leaflets.   9. Moderate tricuspid regurgitation.  10. Sclerotic aortic valve with normal opening.  11. Trace pulmonic valve regurgitation.  12. Estimated pulmonary artery systolic pressure is 45.7 mmHg assuming a   right atrial pressure of 8 mmHg, which is consistent with mild pulmonary   hypertension.    [id number] Phoenix Alvarado MD, Electronically signed on 2018 at 8:26:51   PM

## 2018-12-18 NOTE — PROGRESS NOTE ADULT - ASSESSMENT
64 y/o female with CAD s/p CABG and PCI, PAD s/p balloon angioplasty, diabetes mellitus, hypertension, hyperlipidemia admitted with complaints of worsening exertional shortness of breath. She was evaluated by cardiology and started on IV diuretics, for acute on chronic diastolic CHF. Stress test was done, showed inferior ischemia, medical management advised. She was noted to have CO2 narcosis, placed on NIV. Cardiology planned for right heart catheterisation given RV dysfunction. Dobutamine was given for RV dysfunction. She was noted to have contrast induced nephropathy, diuretics were held. Nephrology consulted, Losartan was held. IR was consulted for pleural effusion, underwent right thoracentesis. RRT was called, for lethargy, related to hypercapnia, improved with NIV. Diuretics were reinstated given worsening symptoms. Her renal function improved. Venofer was added for anemia. PT consulted, advised SINGH. Given improvement in respiratory status, she was started on oral diuretics. Her CXR showed opacification of right lung. Metolazone was added by nephrology for fluid overload. IR consulted for right thoracentesis, she underwent thoracentesis on 12/12, 1500 cc of fluid was removed. On 12/17,found unresponsive, code blue was called CPR initiated. Patient regained consciousness.  ABG following the event showed  compensated respiratory acidosis, with severe hypercapnia. Patient again transfer to ICU placed on AVAPs. Syncope work up completed; EEG: indicative of mild generalized background   slowing.  No epileptiform or seizure activity during this monitoring period.  CT head: non-remarkable. Clinical correlation advised. Patient respiratory status improved, now downgraded back to medical floor.     Syncope and collapse   syncope work up done     Hypercapnic respiratory failure.   On  AVAPS  Compliant with nocturnal NIPPV re-enforced   Pulmonary on board     KYREE/ OHS   Plan as above     CAD s/p CABG and stent   Continue Plavix 75mg and Aspirin 325mg     PAD s/p balloon angioplasty  Continue Plavix 75mg and Aspirin 325mg     HTN/HLD  Continue home medications   HbA1C and lipid profile in AM     DM-2  On metformin 850mg BID will hold   Insulin sliding scale     Supportive  DVT prophylaxis: CSD  Diet: DASH/TLC

## 2018-12-18 NOTE — PROGRESS NOTE ADULT - ASSESSMENT
Echo reveals mild pulm HTN, mod MR  OHS/KYREE/Diastolic HF with hypercarbia and recurrent effusion  CAD  Restrictive pulmonary impairment with chronic hypercarbic respiratory failure with progressive impairment  Vasovagal syncope  Possible aspiration  Tx out of ICU    Plan    Wayne as able  Optimize cardiac function  Nocturnal and prn Bipap vs AVAPS  02 as needed - off HFO2; now on NCO2  Pleurex drainage if effusion continues to recur  NIV with Trilogy on discharge to lower PC02 and to prevent recurrent hospitalization  Bipap was considered but rejected due to the progressive nature of the patient's condition   PSG and PFTs as outpt

## 2018-12-18 NOTE — CHART NOTE - NSCHARTNOTEFT_GEN_A_CORE
Source: Patient [x ]  Family [ ]   other [ ]    Current Diet: Diet, Consistent Carbohydrate/No Snacks:   DASH/TLC {Sodium & Cholesteral Restricted} (11-27-18 @ 11:35)      PO intake:  < 50% [ ]   50-75%  [ ]   %  [ x]  other :    Source for PO intake [x ] Patient [ ] family [ ] chart [ ] staff [ ] other    Current Weight:   (12/16) 213lbs   (12/11) 231lbs  (12/8)  240lbs  (12/2)  215lbs    % Weight Change (unsure of accuracy of weights, will continue to monitor trends)     Pertinent Medications: MEDICATIONS  (STANDING):  aspirin  chewable 81 milliGRAM(s) Oral daily  atorvastatin 40 milliGRAM(s) Oral at bedtime  carvedilol 25 milliGRAM(s) Oral every 12 hours  clopidogrel Tablet 75 milliGRAM(s) Oral daily  dextrose 5%. 1000 milliLiter(s) (50 mL/Hr) IV Continuous <Continuous>  dextrose 50% Injectable 12.5 Gram(s) IV Push once  dextrose 50% Injectable 25 Gram(s) IV Push once  dextrose 50% Injectable 25 Gram(s) IV Push once  docusate sodium 100 milliGRAM(s) Oral two times a day  DULoxetine 60 milliGRAM(s) Oral daily  epoetin sara Injectable 11456 Unit(s) SubCutaneous <User Schedule>  gabapentin 100 milliGRAM(s) Oral three times a day  insulin glargine Injectable (LANTUS) 7 Unit(s) SubCutaneous at bedtime  insulin lispro (HumaLOG) corrective regimen sliding scale   SubCutaneous three times a day before meals  isosorbide   mononitrate ER Tablet (IMDUR) 60 milliGRAM(s) Oral daily  magnesium sulfate  IVPB 2 Gram(s) IV Intermittent once  metolazone 2.5 milliGRAM(s) Oral daily  pantoprazole    Tablet 40 milliGRAM(s) Oral before breakfast  potassium phosphate IVPB 15 milliMole(s) IV Intermittent once  torsemide 40 milliGRAM(s) Oral two times a day    MEDICATIONS  (PRN):  acetaminophen   Tablet .. 650 milliGRAM(s) Oral every 6 hours PRN Temp greater or equal to 38C (100.4F), Mild Pain (1 - 3)  bisacodyl Suppository 10 milliGRAM(s) Rectal daily PRN Constipation  dextrose 40% Gel 15 Gram(s) Oral once PRN Blood Glucose LESS THAN 70 milliGRAM(s)/deciliter  glucagon  Injectable 1 milliGRAM(s) IntraMuscular once PRN Glucose LESS THAN 70 milligrams/deciliter  lactulose Syrup 10 Gram(s) Oral three times a day PRN colnstipaiton    Pertinent Labs: CBC Full  -  ( 18 Dec 2018 06:12 )  WBC Count : 6.8 K/uL  Hemoglobin : 8.0 g/dL  Hematocrit : 24.1 %  Platelet Count - Automated : 226 K/uL  Mean Cell Volume : 83.1 fl  Mean Cell Hemoglobin : 27.6 pg  Mean Cell Hemoglobin Concentration : 33.2 g/dL  Auto Neutrophil # : x  Auto Lymphocyte # : x  Auto Monocyte # : x  Auto Eosinophil # : x  Auto Basophil # : x  Auto Neutrophil % : x  Auto Lymphocyte % : x  Auto Monocyte % : x  Auto Eosinophil % : x  Auto Basophil % : x        Skin: R heel unstageable ulcer (Diabetic ulcer)     Nutrition focused physical exam conducted - found signs of malnutrition [ x]absent [ ]present    Subcutaneous fat loss: [ ] Orbital fat pads region, [ ]Buccal fat region, [ ]Triceps region,  [ ]Ribs region    Muscle wasting: [ ]Temples region, [ ]Clavicle region, [ ]Shoulder region, [ ]Scapula region, [ ]Interosseous region,  [ ]thigh region, [ ]Calf region    Estimated Needs:   [ x] no change since previous assessment  [ ] recalculated:     Current Nutrition Diagnosis:  Pt remains at nutrition risk secondary to altered nutrition related lab values related to DM, KEITH, pulmonary HTN as evidenced by low H/H, elevated glucose levels, elevated BUN/Cr. Pt s/p rapid response 12/17 transferred to ICU for being unresponsive. Pt today awake and alert, reports having a good appetite, tolerating diet well. Recommendations below:     Recommendations:   1. Rx: MVI and Vit. C daily (500mg)   2. Check wt daily to monitor trends   3. Continue with diet Rx   4. monitor renal labs     Monitoring and Evaluation:   [x ] PO intake [x ] Tolerance to diet prescription [X] Weights  [X] Follow up per protocol [X] Labs:

## 2018-12-18 NOTE — PROGRESS NOTE ADULT - SUBJECTIVE AND OBJECTIVE BOX
NEPHROLOGY INTERVAL HPI/OVERNIGHT EVENTS:    Examined earlier    MEDICATIONS  (STANDING):  aspirin  chewable 81 milliGRAM(s) Oral daily  atorvastatin 40 milliGRAM(s) Oral at bedtime  carvedilol 25 milliGRAM(s) Oral every 12 hours  clopidogrel Tablet 75 milliGRAM(s) Oral daily  dextrose 5%. 1000 milliLiter(s) (50 mL/Hr) IV Continuous <Continuous>  dextrose 50% Injectable 12.5 Gram(s) IV Push once  dextrose 50% Injectable 25 Gram(s) IV Push once  dextrose 50% Injectable 25 Gram(s) IV Push once  docusate sodium 100 milliGRAM(s) Oral two times a day  DULoxetine 60 milliGRAM(s) Oral daily  epoetin sara Injectable 41671 Unit(s) SubCutaneous <User Schedule>  gabapentin 100 milliGRAM(s) Oral three times a day  insulin glargine Injectable (LANTUS) 7 Unit(s) SubCutaneous at bedtime  insulin lispro (HumaLOG) corrective regimen sliding scale   SubCutaneous three times a day before meals  isosorbide   mononitrate ER Tablet (IMDUR) 60 milliGRAM(s) Oral daily  metolazone 2.5 milliGRAM(s) Oral daily  pantoprazole    Tablet 40 milliGRAM(s) Oral before breakfast  potassium chloride    Tablet ER 40 milliEquivalent(s) Oral once  torsemide 40 milliGRAM(s) Oral two times a day    MEDICATIONS  (PRN):  acetaminophen   Tablet .. 650 milliGRAM(s) Oral every 6 hours PRN Temp greater or equal to 38C (100.4F), Mild Pain (1 - 3)  bisacodyl Suppository 10 milliGRAM(s) Rectal daily PRN Constipation  dextrose 40% Gel 15 Gram(s) Oral once PRN Blood Glucose LESS THAN 70 milliGRAM(s)/deciliter  glucagon  Injectable 1 milliGRAM(s) IntraMuscular once PRN Glucose LESS THAN 70 milligrams/deciliter  lactulose Syrup 10 Gram(s) Oral three times a day PRN colnstipaiton      Allergies    Accupril (Other)    Intolerances        Vital Signs Last 24 Hrs  T(C): 36.7 (18 Dec 2018 14:30), Max: 37.1 (17 Dec 2018 18:05)  T(F): 98.1 (18 Dec 2018 14:30), Max: 98.7 (17 Dec 2018 18:05)  HR: 67 (18 Dec 2018 14:30) (62 - 69)  BP: 120/58 (18 Dec 2018 14:30) (101/53 - 150/68)  BP(mean): 82 (18 Dec 2018 13:00) (67 - 98)  RR: 20 (18 Dec 2018 14:30) (12 - 37)  SpO2: 97% (18 Dec 2018 14:30) (92% - 100%)  Daily     Daily     PHYSICAL EXAM:  GENERAL: NAD  HEENT: same  NECK: Supple, +JVD  NERVOUS SYSTEM:  Alert & Oriented in bed  CHEST/LUNG: Diminished BS right side, no wheezes  HEART: Regular rate and rhythm; no rub  ABDOMEN: Soft, Nontender,+ distention; +BS; + flank edema  EXTREMITIES:   leg edema better    LABS:                        8.0    6.8   )-----------( 226      ( 18 Dec 2018 06:12 )             24.1     12-18    142  |  91<L>  |  55.0<H>  ----------------------------<  173<H>  3.8   |  43.0<H>  |  1.36<H>    Ca    8.6      18 Dec 2018 06:12  Phos  2.2     12-18  Mg     1.9     12-18      PT/INR - ( 17 Dec 2018 03:48 )   PT: 17.5 sec;   INR: 1.50 ratio         PTT - ( 17 Dec 2018 03:48 )  PTT:33.8 sec    Magnesium, Serum: 1.9 mg/dL (12-18 @ 06:12)  Phosphorus Level, Serum: 2.2 mg/dL (12-18 @ 06:12)    ABG - ( 17 Dec 2018 14:18 )  pH, Arterial: 7.45  pH, Blood: x     /  pCO2: 70    /  pO2: 62    / HCO3: 46    / Base Excess: 21.4  /  SaO2: 91                    RADIOLOGY & ADDITIONAL TESTS:

## 2018-12-18 NOTE — PROVIDER CONTACT NOTE (EICU) - SITUATION
As per local ICU electrolytes repletion guidelines, the electrolyte deficiencies were replenished.  Potassium Phosphate 15mmol & magnesium sulfate 2grams IV

## 2018-12-18 NOTE — PROGRESS NOTE ADULT - SUBJECTIVE AND OBJECTIVE BOX
64 y/o female with CAD s/p CABG and PCI, PAD s/p balloon angioplasty, diabetes mellitus, hypertension, hyperlipidemia admitted with complaints of worsening exertional shortness of breath. She was evaluated by cardiology and started on IV diuretics, for acute on chronic diastolic CHF. Stress test was done, showed inferior ischemia, medical management advised. She was noted to have CO2 narcosis, placed on NIV. Cardiology planned for right heart catheterisation given RV dysfunction. Dobutamine was given for RV dysfunction. She was noted to have contrast induced nephropathy, diuretics were held. Nephrology consulted, Losartan was held. IR was consulted for pleural effusion, underwent right thoracentesis. RRT was called, for lethargy, related to hypercapnia, improved with NIV. Diuretics were reinstated given worsening symptoms. Her renal function improved. Venofer was added for anemia. PT consulted, advised SINGH. Given improvement in respiratory status, she was started on oral diuretics. Her CXR showed opacification of right lung. Metolazone was added by nephrology for fluid overload. IR consulted for right thoracentesis, she underwent thoracentesis on , 1500 cc of fluid was removed. On ,found unresponsive, code blue was called CPR initiated. Patient regained consciousness.  ABG following the event showed  compensated respiratory acidosis, with severe hypercapnia. Patient again transfer to ICU placed on AVAPs. Patient respiratory status improved, now downgraded back to medical floor.     PAST MEDICAL & SURGICAL HISTORY:  Herniated disc, cervical  PAD (peripheral artery disease)  Legally blind  History of peripheral vascular disease  History of retinal detachment  History of CEA (carotid endarterectomy): Right  Hyperlipidemia  Glaucoma  Hypertension  Diabetes  S/P CABG x 1  After cataract  Uveitic glaucoma of both eyes  Detached retina  Atherosclerosis of right carotid artery   delivery delivered    Allergies: Accupril     FAMILY HISTORY:  Family history of diabetes mellitus (Father, Mother)  Family history of essential hypertension (Father, Mother)    Social History: FOrmer smoker No ETOH     Medications:  carvedilol 25 milliGRAM(s) Oral every 12 hours  isosorbide   mononitrate ER Tablet (IMDUR) 60 milliGRAM(s) Oral daily  metolazone 2.5 milliGRAM(s) Oral daily  torsemide 40 milliGRAM(s) Oral two times a day  acetaminophen   Tablet .. 650 milliGRAM(s) Oral every 6 hours PRN  DULoxetine 60 milliGRAM(s) Oral daily  gabapentin 100 milliGRAM(s) Oral three times a day  aspirin  chewable 81 milliGRAM(s) Oral daily  clopidogrel Tablet 75 milliGRAM(s) Oral daily  bisacodyl Suppository 10 milliGRAM(s) Rectal daily PRN  docusate sodium 100 milliGRAM(s) Oral two times a day  lactulose Syrup 10 Gram(s) Oral three times a day PRN  pantoprazole    Tablet 40 milliGRAM(s) Oral before breakfast  atorvastatin 40 milliGRAM(s) Oral at bedtime  dextrose 40% Gel 15 Gram(s) Oral once PRN  insulin glargine Injectable (LANTUS) 7 Unit(s) SubCutaneous at bedtime  insulin lispro (HumaLOG) corrective regimen sliding scale   SubCutaneous three times a day before meals  epoetin sara Injectable 13846 Unit(s) SubCutaneous <User Schedule>      Physical Examination:  VS: Temp: 97.9; HR: 66; BP: 146/67; RR: 27   GENERAL: Awake and responsive, not in acute distress   EYES: Pupils equal, reactive to light.  Symmetric.  EARS, NOSE, THROAT: Normal; supple neck, no lymphadenopathy; trachea midline  Lungs: Clear to auscultation bilaterally  CVS: Regular rate and rhythm, s1, s2  GI: Soft, nondistended, nontender, normoactive bowel sounds, no masses, no guarding  EXTREMITIES: No edema  SKIN: Warm and well perfused, no rashes noted.  NEURO: Alert, oriented, respond to verbal command      Labs:   Na: 142; K: 3.8; Cl: 91; CO2: 43.1; BUN: 55; Cr. 1.36; Glu: 173  WBC: 6.8; H/H: 8/24.1; Plt: 226 62 y/o female with CAD s/p CABG and PCI, PAD s/p balloon angioplasty, diabetes mellitus, hypertension, hyperlipidemia admitted with complaints of worsening exertional shortness of breath. She was evaluated by cardiology and started on IV diuretics, for acute on chronic diastolic CHF. Stress test was done, showed inferior ischemia, medical management advised. She was noted to have CO2 narcosis, placed on NIV. Cardiology planned for right heart catheterisation given RV dysfunction. Dobutamine was given for RV dysfunction. She was noted to have contrast induced nephropathy, diuretics were held. Nephrology consulted, Losartan was held. IR was consulted for pleural effusion, underwent right thoracentesis. RRT was called, for lethargy, related to hypercapnia, improved with NIV. Diuretics were reinstated given worsening symptoms. Her renal function improved. Venofer was added for anemia. PT consulted, advised SINGH. Given improvement in respiratory status, she was started on oral diuretics. Her CXR showed opacification of right lung. Metolazone was added by nephrology for fluid overload. IR consulted for right thoracentesis, she underwent thoracentesis on , 1500 cc of fluid was removed. On ,found unresponsive, code blue was called CPR initiated. Patient regained consciousness.  ABG following the event showed  compensated respiratory acidosis, with severe hypercapnia. Patient again transfer to ICU placed on AVAPs. Patient respiratory status improved, now downgraded back to medical floor.     Today, patient seen and examined at bed side. Not in acute distress. She noted improvement in her respiratory status. She has not acute complaint.     PAST MEDICAL & SURGICAL HISTORY:  Herniated disc, cervical  PAD (peripheral artery disease)  Legally blind  History of peripheral vascular disease  History of retinal detachment  History of CEA (carotid endarterectomy): Right  Hyperlipidemia  Glaucoma  Hypertension  Diabetes  S/P CABG x 1  After cataract  Uveitic glaucoma of both eyes  Detached retina  Atherosclerosis of right carotid artery   delivery delivered    Allergies: Accupril     FAMILY HISTORY:  Family history of diabetes mellitus (Father, Mother)  Family history of essential hypertension (Father, Mother)    Social History: FOrmer smoker No ETOH     Medications:  carvedilol 25 milliGRAM(s) Oral every 12 hours  isosorbide   mononitrate ER Tablet (IMDUR) 60 milliGRAM(s) Oral daily  metolazone 2.5 milliGRAM(s) Oral daily  torsemide 40 milliGRAM(s) Oral two times a day  acetaminophen   Tablet .. 650 milliGRAM(s) Oral every 6 hours PRN  DULoxetine 60 milliGRAM(s) Oral daily  gabapentin 100 milliGRAM(s) Oral three times a day  aspirin  chewable 81 milliGRAM(s) Oral daily  clopidogrel Tablet 75 milliGRAM(s) Oral daily  bisacodyl Suppository 10 milliGRAM(s) Rectal daily PRN  docusate sodium 100 milliGRAM(s) Oral two times a day  lactulose Syrup 10 Gram(s) Oral three times a day PRN  pantoprazole    Tablet 40 milliGRAM(s) Oral before breakfast  atorvastatin 40 milliGRAM(s) Oral at bedtime  dextrose 40% Gel 15 Gram(s) Oral once PRN  insulin glargine Injectable (LANTUS) 7 Unit(s) SubCutaneous at bedtime  insulin lispro (HumaLOG) corrective regimen sliding scale   SubCutaneous three times a day before meals  epoetin sara Injectable 01580 Unit(s) SubCutaneous <User Schedule>      Physical Examination:  VS: Temp: 97.9; HR: 66; BP: 146/67; RR: 27   GENERAL: Awake and responsive, not in acute distress   EYES: Pupils equal, reactive to light.  Symmetric.  EARS, NOSE, THROAT: Normal; supple neck, no lymphadenopathy; trachea midline  Lungs: Clear to auscultation bilaterally  CVS: Regular rate and rhythm, s1, s2  GI: Soft, nondistended, nontender, normoactive bowel sounds, no masses, no guarding  EXTREMITIES: No edema  SKIN: Warm and well perfused, no rashes noted.  NEURO: Alert, oriented, respond to verbal command      Labs:   Na: 142; K: 3.8; Cl: 91; CO2: 43.1; BUN: 55; Cr. 1.36; Glu: 173  WBC: 6.8; H/H: 8/24.1; Plt: 226

## 2018-12-19 LAB
ANION GAP SERPL CALC-SCNC: 9 MMOL/L — SIGNIFICANT CHANGE UP (ref 5–17)
BUN SERPL-MCNC: 57 MG/DL — HIGH (ref 8–20)
CALCIUM SERPL-MCNC: 7.6 MG/DL — LOW (ref 8.6–10.2)
CHLORIDE SERPL-SCNC: 91 MMOL/L — LOW (ref 98–107)
CO2 SERPL-SCNC: 42 MMOL/L — HIGH (ref 22–29)
CREAT SERPL-MCNC: 1.23 MG/DL — SIGNIFICANT CHANGE UP (ref 0.5–1.3)
GLUCOSE BLDC GLUCOMTR-MCNC: 217 MG/DL — HIGH (ref 70–99)
GLUCOSE BLDC GLUCOMTR-MCNC: 260 MG/DL — HIGH (ref 70–99)
GLUCOSE BLDC GLUCOMTR-MCNC: 274 MG/DL — HIGH (ref 70–99)
GLUCOSE BLDC GLUCOMTR-MCNC: 279 MG/DL — HIGH (ref 70–99)
GLUCOSE SERPL-MCNC: 242 MG/DL — HIGH (ref 70–115)
POTASSIUM SERPL-MCNC: 3.8 MMOL/L — SIGNIFICANT CHANGE UP (ref 3.5–5.3)
POTASSIUM SERPL-SCNC: 3.8 MMOL/L — SIGNIFICANT CHANGE UP (ref 3.5–5.3)
SODIUM SERPL-SCNC: 142 MMOL/L — SIGNIFICANT CHANGE UP (ref 135–145)

## 2018-12-19 PROCEDURE — 99233 SBSQ HOSP IP/OBS HIGH 50: CPT

## 2018-12-19 PROCEDURE — 99232 SBSQ HOSP IP/OBS MODERATE 35: CPT

## 2018-12-19 RX ORDER — INSULIN LISPRO 100/ML
3 VIAL (ML) SUBCUTANEOUS
Qty: 0 | Refills: 0 | Status: DISCONTINUED | OUTPATIENT
Start: 2018-12-19 | End: 2018-12-26

## 2018-12-19 RX ADMIN — CLOPIDOGREL BISULFATE 75 MILLIGRAM(S): 75 TABLET, FILM COATED ORAL at 12:14

## 2018-12-19 RX ADMIN — CARVEDILOL PHOSPHATE 25 MILLIGRAM(S): 80 CAPSULE, EXTENDED RELEASE ORAL at 05:59

## 2018-12-19 RX ADMIN — Medication 100 MILLIGRAM(S): at 06:01

## 2018-12-19 RX ADMIN — PANTOPRAZOLE SODIUM 40 MILLIGRAM(S): 20 TABLET, DELAYED RELEASE ORAL at 06:08

## 2018-12-19 RX ADMIN — Medication 3: at 12:58

## 2018-12-19 RX ADMIN — GABAPENTIN 100 MILLIGRAM(S): 400 CAPSULE ORAL at 12:59

## 2018-12-19 RX ADMIN — GABAPENTIN 100 MILLIGRAM(S): 400 CAPSULE ORAL at 05:59

## 2018-12-19 RX ADMIN — Medication 40 MILLIGRAM(S): at 05:58

## 2018-12-19 RX ADMIN — Medication 100 MILLIGRAM(S): at 17:20

## 2018-12-19 RX ADMIN — LACTULOSE 10 GRAM(S): 10 SOLUTION ORAL at 06:00

## 2018-12-19 RX ADMIN — DULOXETINE HYDROCHLORIDE 60 MILLIGRAM(S): 30 CAPSULE, DELAYED RELEASE ORAL at 12:14

## 2018-12-19 RX ADMIN — ISOSORBIDE MONONITRATE 60 MILLIGRAM(S): 60 TABLET, EXTENDED RELEASE ORAL at 12:14

## 2018-12-19 RX ADMIN — Medication 3 UNIT(S): at 12:58

## 2018-12-19 RX ADMIN — Medication 650 MILLIGRAM(S): at 13:10

## 2018-12-19 RX ADMIN — Medication 81 MILLIGRAM(S): at 12:14

## 2018-12-19 RX ADMIN — Medication 650 MILLIGRAM(S): at 12:15

## 2018-12-19 RX ADMIN — CARVEDILOL PHOSPHATE 25 MILLIGRAM(S): 80 CAPSULE, EXTENDED RELEASE ORAL at 17:20

## 2018-12-19 RX ADMIN — Medication 40 MILLIGRAM(S): at 17:20

## 2018-12-19 RX ADMIN — ATORVASTATIN CALCIUM 40 MILLIGRAM(S): 80 TABLET, FILM COATED ORAL at 22:14

## 2018-12-19 RX ADMIN — INSULIN GLARGINE 7 UNIT(S): 100 INJECTION, SOLUTION SUBCUTANEOUS at 22:14

## 2018-12-19 RX ADMIN — Medication 3 UNIT(S): at 17:40

## 2018-12-19 RX ADMIN — Medication 3: at 08:50

## 2018-12-19 RX ADMIN — GABAPENTIN 100 MILLIGRAM(S): 400 CAPSULE ORAL at 22:14

## 2018-12-19 RX ADMIN — Medication 2: at 17:40

## 2018-12-19 NOTE — PROGRESS NOTE ADULT - SUBJECTIVE AND OBJECTIVE BOX
CHIEF COMPLAINT/INTERVAL HISTORY:    Patient is a 63y old  Female who presents with a chief complaint of Shortness of breath (19 Dec 2018 10:39)      HPI:  64 y/o female with PMHx of CAD s/p CABG x1 Stent placement x2,  PAD s/p balloon angioplasty, DM2, HTN, HLD came to the ED complaining of worsening dyspnea with minimal exertion and increasing abdominal girth over the past 2 weeks. She usually ambulate with walker since R foot heel & 3 toes amputation last December without difficulty but lately has been having difficulty ambulating due to shortness of breath. She also reported generalized pain in her abdomen radiating to the left flank due to swelling in her abdomen. She has no chest pain, palpitation, orthopnea, cough, fever, chills, change in bowel/urinary habit, nausea, vomiting, recent travel, calf pain. Patient endorsed compliance to medication and diet. (25 Nov 2018 22:56)      SUBJECTIVE & OBJECTIVE/ ROS: Pt seen and examined at bedside. Patient is awake and alert & appears comfortable.  No chest pain, palpitations, sob, light headedness/dizziness, difficulty breathing/cough, fevers/chills, abdominal pain, n/v, diarrhea/constipation, dysuria or increased urinary frequency.     ICU Vital Signs Last 24 Hrs  T(C): 36.7 (19 Dec 2018 10:15), Max: 36.9 (19 Dec 2018 05:54)  T(F): 98.1 (19 Dec 2018 10:15), Max: 98.4 (19 Dec 2018 05:54)  HR: 75 (19 Dec 2018 10:15) (60 - 75)  BP: 144/70 (19 Dec 2018 10:15) (130/58 - 144/70)  BP(mean): --  ABP: --  ABP(mean): --  RR: 16 (19 Dec 2018 05:54) (16 - 18)  SpO2: 94% (19 Dec 2018 10:15) (94% - 98%)        MEDICATIONS  (STANDING):  aspirin  chewable 81 milliGRAM(s) Oral daily  atorvastatin 40 milliGRAM(s) Oral at bedtime  carvedilol 25 milliGRAM(s) Oral every 12 hours  clopidogrel Tablet 75 milliGRAM(s) Oral daily  dextrose 5%. 1000 milliLiter(s) (50 mL/Hr) IV Continuous <Continuous>  dextrose 50% Injectable 12.5 Gram(s) IV Push once  dextrose 50% Injectable 25 Gram(s) IV Push once  dextrose 50% Injectable 25 Gram(s) IV Push once  docusate sodium 100 milliGRAM(s) Oral two times a day  DULoxetine 60 milliGRAM(s) Oral daily  epoetin sara Injectable 63328 Unit(s) SubCutaneous <User Schedule>  gabapentin 100 milliGRAM(s) Oral three times a day  insulin glargine Injectable (LANTUS) 7 Unit(s) SubCutaneous at bedtime  insulin lispro (HumaLOG) corrective regimen sliding scale   SubCutaneous three times a day before meals  insulin lispro Injectable (HumaLOG) 3 Unit(s) SubCutaneous three times a day with meals  isosorbide   mononitrate ER Tablet (IMDUR) 60 milliGRAM(s) Oral daily  metolazone 2.5 milliGRAM(s) Oral daily  pantoprazole    Tablet 40 milliGRAM(s) Oral before breakfast  torsemide 40 milliGRAM(s) Oral two times a day    MEDICATIONS  (PRN):  acetaminophen   Tablet .. 650 milliGRAM(s) Oral every 6 hours PRN Temp greater or equal to 38C (100.4F), Mild Pain (1 - 3)  bisacodyl Suppository 10 milliGRAM(s) Rectal daily PRN Constipation  dextrose 40% Gel 15 Gram(s) Oral once PRN Blood Glucose LESS THAN 70 milliGRAM(s)/deciliter  glucagon  Injectable 1 milliGRAM(s) IntraMuscular once PRN Glucose LESS THAN 70 milligrams/deciliter  lactulose Syrup 10 Gram(s) Oral three times a day PRN colnstipaiton      LABS:                        8.0    6.8   )-----------( 226      ( 18 Dec 2018 06:12 )             24.1     12-19    142  |  91<L>  |  57.0<H>  ----------------------------<  242<H>  3.8   |  42.0<H>  |  1.23    Ca    7.6<L>      19 Dec 2018 06:10  Phos  2.2     12-18  Mg     1.9     12-18            CAPILLARY BLOOD GLUCOSE      POCT Blood Glucose.: 279 mg/dL (19 Dec 2018 12:32)  POCT Blood Glucose.: 260 mg/dL (19 Dec 2018 08:22)  POCT Blood Glucose.: 312 mg/dL (18 Dec 2018 21:53)  POCT Blood Glucose.: 265 mg/dL (18 Dec 2018 16:56)      RECENT CULTURES:      RADIOLOGY & ADDITIONAL TESTS:      PHYSICAL EXAM:    GENERAL: Awake and responsive, not in acute distress, morbidly obese   EYES: Pupils equal, reactive to light.  Symmetric.  EARS, NOSE, THROAT: Normal; supple neck, no lymphadenopathy; trachea midline  Lungs: Clear to auscultation bilaterally  CVS: Regular rate and rhythm, s1, s2  GI: Soft, nondistended, nontender, normoactive bowel sounds, no masses, no guarding  EXTREMITIES: No edema  SKIN: Warm and well perfused, no rashes noted.  NEURO: Alert, oriented, respond to verbal command

## 2018-12-19 NOTE — PROGRESS NOTE ADULT - ASSESSMENT
Improved KEITH   Continue the current diuretics   CHF (EF= 70%; mild pulm HTN)==> diastolic dysfunction  Renal sono no hydro  Renal fcn stable with diuresis

## 2018-12-19 NOTE — PROGRESS NOTE ADULT - ASSESSMENT
64 y/o female with CAD s/p CABG and PCI, PAD s/p balloon angioplasty, diabetes mellitus, hypertension, hyperlipidemia admitted with complaints of worsening exertional shortness of breath. She was evaluated by cardiology and started on IV diuretics, for acute on chronic diastolic CHF. Stress test was done, showed inferior ischemia, medical management advised. She was noted to have CO2 narcosis, placed on NIV. Cardiology planned for right heart catheterisation given RV dysfunction. Dobutamine was given for RV dysfunction. She was noted to have contrast induced nephropathy, diuretics were held. Nephrology consulted, Losartan was held. IR was consulted for pleural effusion, underwent right thoracentesis. RRT was called, for lethargy, related to hypercapnia, improved with NIV. Diuretics were reinstated given worsening symptoms. Her renal function improved. Venofer was added for anemia. PT consulted, advised SINGH. Given improvement in respiratory status, she was started on oral diuretics. Her CXR showed opacification of right lung. Metolazone was added by nephrology for fluid overload. IR consulted for right thoracentesis, she underwent thoracentesis on 12/12, 1500 cc of fluid was removed. On 12/17,found unresponsive, code blue was called CPR initiated. Patient regained consciousness.  ABG following the event showed  compensated respiratory acidosis, with severe hypercapnia. Patient again transfer to ICU placed on AVAPs. Syncope work up completed; EEG: indicative of mild generalized background   slowing.  No epileptiform or seizure activity during this monitoring period.  CT head: non-remarkable. Clinical correlation advised. Patient respiratory status improved, now downgraded back to medical floor.     Syncope and collapse   syncope work up done     Hypercapnic respiratory failure.   Nocturnal and prn Bipap - NIV preferred due to progressive nature of her disease and to lower PCO2 and to avoid recurrent hospitalization  02 as needed - off HFO2; now on NCO2  Pleurex drainage if effusion continues to recur  NIV with Trilogy rather than Bipap on discharge due to the progressive nature of her condition to lower PC02 and to prevent recurrent hospitalization  Bipap was considered but rejected due to the progressive nature of the patient's condition   PSG and PFTs as outpt  Pulmonary on board     KYREE/ OHS   Plan as above     CAD s/p CABG and stent   Continue Plavix 75mg and Aspirin 325mg     PAD s/p balloon angioplasty  Continue Plavix 75mg and Aspirin 325mg     HTN/HLD  Continue home medications   HbA1C and lipid profile in AM     DM-2  On metformin 850mg BID will hold   Insulin sliding scale     Supportive  DVT prophylaxis: CSD  Diet: DASH/TLC

## 2018-12-19 NOTE — PROGRESS NOTE ADULT - ASSESSMENT
Assess    Echo reveals mild pulm HTN, mod MR  OHS/KYREE/Diastolic HF with hypercarbia and recurrent effusion  CAD  Restrictive pulmonary impairment with chronic hypercarbic respiratory failure with progressive impairment  Vasovagal syncope  Possible aspiration  Tx'd out of ICU    Plan    [place] as able  Optimize cardiac function  Nocturnal and prn Bipap - NIV preferred due to progressive nature of her disease and to lower PCO2 and to avoid recurrent hospitalization  02 as needed - off HFO2; now on NCO2  Pleurex drainage if effusion continues to recur  NIV with Trilogy rather than Bipap on discharge due to the progressive nature of her condition to lower PC02 and to prevent recurrent hospitalization  Bipap was considered but rejected due to the progressive nature of the patient's condition   PSG and PFTs as outpt

## 2018-12-19 NOTE — PROGRESS NOTE ADULT - SUBJECTIVE AND OBJECTIVE BOX
NEPHROLOGY INTERVAL HPI/OVERNIGHT EVENTS:    Feels better w/ diuresis   Less edema     MEDICATIONS  (STANDING):  aspirin  chewable 81 milliGRAM(s) Oral daily  atorvastatin 40 milliGRAM(s) Oral at bedtime  carvedilol 25 milliGRAM(s) Oral every 12 hours  clopidogrel Tablet 75 milliGRAM(s) Oral daily  dextrose 5%. 1000 milliLiter(s) (50 mL/Hr) IV Continuous <Continuous>  dextrose 50% Injectable 12.5 Gram(s) IV Push once  dextrose 50% Injectable 25 Gram(s) IV Push once  dextrose 50% Injectable 25 Gram(s) IV Push once  docusate sodium 100 milliGRAM(s) Oral two times a day  DULoxetine 60 milliGRAM(s) Oral daily  epoetin sara Injectable 63333 Unit(s) SubCutaneous <User Schedule>  gabapentin 100 milliGRAM(s) Oral three times a day  insulin glargine Injectable (LANTUS) 7 Unit(s) SubCutaneous at bedtime  insulin lispro (HumaLOG) corrective regimen sliding scale   SubCutaneous three times a day before meals  insulin lispro Injectable (HumaLOG) 3 Unit(s) SubCutaneous three times a day with meals  isosorbide   mononitrate ER Tablet (IMDUR) 60 milliGRAM(s) Oral daily  metolazone 2.5 milliGRAM(s) Oral daily  pantoprazole    Tablet 40 milliGRAM(s) Oral before breakfast  torsemide 40 milliGRAM(s) Oral two times a day    MEDICATIONS  (PRN):  acetaminophen   Tablet .. 650 milliGRAM(s) Oral every 6 hours PRN Temp greater or equal to 38C (100.4F), Mild Pain (1 - 3)  bisacodyl Suppository 10 milliGRAM(s) Rectal daily PRN Constipation  dextrose 40% Gel 15 Gram(s) Oral once PRN Blood Glucose LESS THAN 70 milliGRAM(s)/deciliter  glucagon  Injectable 1 milliGRAM(s) IntraMuscular once PRN Glucose LESS THAN 70 milligrams/deciliter  lactulose Syrup 10 Gram(s) Oral three times a day PRN colnstipaiton      Allergies    Accupril (Other)    Intolerances          Vital Signs Last 24 Hrs  T(C): 36.7 (19 Dec 2018 10:15), Max: 36.9 (19 Dec 2018 05:54)  T(F): 98.1 (19 Dec 2018 10:15), Max: 98.4 (19 Dec 2018 05:54)  HR: 75 (19 Dec 2018 10:15) (60 - 75)  BP: 144/70 (19 Dec 2018 10:15) (130/58 - 144/70)  BP(mean): --  RR: 16 (19 Dec 2018 05:54) (16 - 18)  SpO2: 94% (19 Dec 2018 10:15) (94% - 98%)  Daily     Daily   I&O's Detail    18 Dec 2018 07:01  -  19 Dec 2018 07:00  --------------------------------------------------------  IN:    Oral Fluid: 840 mL    Solution: 125 mL    Solution: 50 mL  Total IN: 1015 mL    OUT:    Voided: 2600 mL  Total OUT: 2600 mL    Total NET: -1585 mL      19 Dec 2018 07:01  -  19 Dec 2018 15:40  --------------------------------------------------------  IN:    Oral Fluid: 480 mL  Total IN: 480 mL    OUT:  Total OUT: 0 mL    Total NET: 480 mL        I&O's Summary    18 Dec 2018 07:01  -  19 Dec 2018 07:00  --------------------------------------------------------  IN: 1015 mL / OUT: 2600 mL / NET: -1585 mL    19 Dec 2018 07:01  -  19 Dec 2018 15:40  --------------------------------------------------------  IN: 480 mL / OUT: 0 mL / NET: 480 mL        PHYSICAL EXAM:  GENERAL: NAD  HEENT: same  NECK: Supple, +JVD  NERVOUS SYSTEM:  Alert & Oriented in bed  CHEST/LUNG: Diminished BS right side, no wheezes  HEART: Regular rate and rhythm; no rub  ABDOMEN: Soft, Nontender,+ distention; +BS; + flank edema  EXTREMITIES:   leg edema better  LABS:                        8.0    6.8   )-----------( 226      ( 18 Dec 2018 06:12 )             24.1     12-19    142  |  91<L>  |  57.0<H>  ----------------------------<  242<H>  3.8   |  42.0<H>  |  1.23    Ca    7.6<L>      19 Dec 2018 06:10  Phos  2.2     12-18  Mg     1.9     12-18                  RADIOLOGY & ADDITIONAL TESTS:

## 2018-12-19 NOTE — PROGRESS NOTE ADULT - SUBJECTIVE AND OBJECTIVE BOX
PULMONARY PROGRESS NOTE      KUN ROCHA  MRN-[id number]    Patient is a 63y old  Female who presents with a chief complaint of Shortness of breath (18 Dec 2018 15:23)  Acute on chronic diastolic HF  KEITH - resolved  DM  HTN  CAD  OHS/KYREE with hypercarbic respiratory failure  Last IR drainage of pleural effusion on       INTERVAL HPI/OVERNIGHT EVENTS:    Patient is awake and alert  Comfortable  Tx'd out of ICU  Willing to use AVAPS/BiPAP - Doesn't like the noise from Bipap    MEDICATIONS  (STANDING):  aspirin  chewable 81 milliGRAM(s) Oral daily  atorvastatin 40 milliGRAM(s) Oral at bedtime  carvedilol 25 milliGRAM(s) Oral every 12 hours  clopidogrel Tablet 75 milliGRAM(s) Oral daily  dextrose 5%. 1000 milliLiter(s) (50 mL/Hr) IV Continuous <Continuous>  dextrose 50% Injectable 12.5 Gram(s) IV Push once  dextrose 50% Injectable 25 Gram(s) IV Push once  dextrose 50% Injectable 25 Gram(s) IV Push once  docusate sodium 100 milliGRAM(s) Oral two times a day  DULoxetine 60 milliGRAM(s) Oral daily  epoetin sara Injectable 08873 Unit(s) SubCutaneous <User Schedule>  gabapentin 100 milliGRAM(s) Oral three times a day  insulin glargine Injectable (LANTUS) 7 Unit(s) SubCutaneous at bedtime  insulin lispro (HumaLOG) corrective regimen sliding scale   SubCutaneous three times a day before meals  isosorbide   mononitrate ER Tablet (IMDUR) 60 milliGRAM(s) Oral daily  metolazone 2.5 milliGRAM(s) Oral daily  pantoprazole    Tablet 40 milliGRAM(s) Oral before breakfast  potassium chloride    Tablet ER 40 milliEquivalent(s) Oral once  torsemide 40 milliGRAM(s) Oral two times a day      MEDICATIONS  (PRN):  acetaminophen   Tablet .. 650 milliGRAM(s) Oral every 6 hours PRN Temp greater or equal to 38C (100.4F), Mild Pain (1 - 3)  bisacodyl Suppository 10 milliGRAM(s) Rectal daily PRN Constipation  dextrose 40% Gel 15 Gram(s) Oral once PRN Blood Glucose LESS THAN 70 milliGRAM(s)/deciliter  glucagon  Injectable 1 milliGRAM(s) IntraMuscular once PRN Glucose LESS THAN 70 milligrams/deciliter  lactulose Syrup 10 Gram(s) Oral three times a day PRN colnstipaiton      Allergies    Accupril (Other)    Intolerances        PAST MEDICAL & SURGICAL HISTORY:  Herniated disc, cervical  PAD (peripheral artery disease)  Legally blind  History of peripheral vascular disease  History of retinal detachment  History of CEA (carotid endarterectomy): Right  Hyperlipidemia  Glaucoma  Hypertension  Diabetes  S/P CABG x 1  After cataract  Uveitic glaucoma of both eyes  Detached retina  Atherosclerosis of right carotid artery   delivery delivered        REVIEW OF SYSTEMS:    CONSTITUTIONAL:  No distress    HEENT:  Eyes:  No diplopia or blurred vision. ENT:  No earache, sore throat or runny nose.    CARDIOVASCULAR:  No pressure, squeezing, tightness, heaviness or aching about the chest; no palpitations.    RESPIRATORY:  No cough, improved shortness of breath, no PND or orthopnea. Mild SOBOE    GASTROINTESTINAL:  No nausea, vomiting or diarrhea.    GENITOURINARY:  No dysuria, frequency or urgency.    NEUROLOGIC:  No paresthesias, fasciculations, seizures or weakness.    PSYCHIATRIC:  No disorder of thought or mood.    Vital Signs Last 24 Hrs  T(C): 36.7 (18 Dec 2018 14:30), Max: 37.1 (17 Dec 2018 18:05)  T(F): 98.1 (18 Dec 2018 14:30), Max: 98.7 (17 Dec 2018 18:05)  HR: 67 (18 Dec 2018 14:30) (62 - 69)  BP: 120/58 (18 Dec 2018 14:30) (101/53 - 150/68)  BP(mean): 82 (18 Dec 2018 13:00) (67 - 98)  RR: 20 (18 Dec 2018 14:30) (12 - 37)  SpO2: 97% (18 Dec 2018 14:30) (92% - 100%)    PHYSICAL EXAMINATION:    GENERAL: The patient is awake and alert in no apparent distress.     HEENT: Head is normocephalic and atraumatic. Extraocular muscles are intact. Mucous membranes are moist.    NECK: Supple.    LUNGS: Clear to auscultation without wheezing, rales or rhonchi; respirations unlabored    HEART: Regular rate and rhythm with mild murmur.    ABDOMEN: Soft, nontender, and nondistended.      EXTREMITIES: Without any cyanosis, clubbing, rash, with trace edema.    NEUROLOGIC: Grossly intact.    LABS:                        8.0    6.8   )-----------( 226      ( 18 Dec 2018 06:12 )             24.1         142  |  91<L>  |  55.0<H>  ----------------------------<  173<H>  3.8   |  43.0<H>  |  1.36<H>    Ca    8.6      18 Dec 2018 06:12  Phos  2.2       Mg     1.9           PT/INR - ( 17 Dec 2018 03:48 )   PT: 17.5 sec;   INR: 1.50 ratio         PTT - ( 17 Dec 2018 03:48 )  PTT:33.8 sec    ABG - ( 17 Dec 2018 14:18 )  pH, Arterial: 7.45  pH, Blood: x     /  pCO2: 70    /  pO2: 62    / HCO3: 46    / Base Excess: 21.4  /  SaO2: 91                CARDIAC MARKERS ( 17 Dec 2018 08:15 )  x     / 0.08 ng/mL / 40 U/L / x     / x      CARDIAC MARKERS ( 17 Dec 2018 08:14 )  x     / 0.09 ng/mL / x     / x     / x      CARDIAC MARKERS ( 17 Dec 2018 03:48 )  x     / 0.09 ng/mL / 45 U/L / x     / x            Serum Pro-Brain Natriuretic Peptide: 9880 pg/mL (18 @ 08:15)      RADIOLOGY & ADDITIONAL STUDIES:       EXAM:  XR CHEST PORTABLE IMMED 1V                          PROCEDURE DATE:  2018          INTERPRETATION:  Chest radiograph (one view)     CPT 85789    CLINICAL INFORMATION:       Moderate shortness of breath since today.    TECHNIQUE:  Single frontal view of the chest was obtained.    FINDINGS:  Prior study dated 2018 was available for review.    The lungs demonstrate a moderate-sized right pleural effusion. This is   shown an increase in size from prior study. Underlying infiltrate and/or   atelectasis cannot be excluded. The heart is prominent in size.  Patient   is status post median sternotomy and coronary artery bypass graft.      IMPRESSION: Prominent cardiac silhouette.   Increasing right pleural   effusion when compared to prior study dated 2018.           MADAN CRAVEN M.D., ATTENDING RADIOLOGIST  This document has been electronically signed. Dec 16 2018 12:01PM            ECHO:      Summary:   1. Left ventricular ejection fraction, by visual estimation, is 65 to   70%.   2. Normal global left ventricular systolic function.   3. There is mild concentric left ventricular hypertrophy.   4. Moderately enlarged right ventricle.   5. Moderately reduced RV systolic function.   6. There is no evidence of pericardial effusion.   7. Moderate mitral valve regurgitation.   8. Thickening of the anterior and posterior mitral valve leaflets.   9. Moderate tricuspid regurgitation.  10. Sclerotic aortic valve with normal opening.  11. Trace pulmonic valve regurgitation.  12. Estimated pulmonary artery systolic pressure is 45.7 mmHg assuming a   right atrial pressure of 8 mmHg, which is consistent with mild pulmonary   hypertension.    [id number] hPoenix Alvarado MD, Electronically signed on 2018 at 8:26:51   PM

## 2018-12-20 ENCOUNTER — APPOINTMENT (OUTPATIENT)
Dept: CARDIOLOGY | Facility: CLINIC | Age: 63
End: 2018-12-20

## 2018-12-20 DIAGNOSIS — I50.9 HEART FAILURE, UNSPECIFIED: ICD-10-CM

## 2018-12-20 DIAGNOSIS — Z51.5 ENCOUNTER FOR PALLIATIVE CARE: ICD-10-CM

## 2018-12-20 LAB
ANION GAP SERPL CALC-SCNC: 6 MMOL/L — SIGNIFICANT CHANGE UP (ref 5–17)
B PERT IGG+IGM PNL SER: ABNORMAL
BASOPHILS # BLD AUTO: 0 K/UL — SIGNIFICANT CHANGE UP (ref 0–0.2)
BASOPHILS NFR BLD AUTO: 0.3 % — SIGNIFICANT CHANGE UP (ref 0–2)
BUN SERPL-MCNC: 51 MG/DL — HIGH (ref 8–20)
CALCIUM SERPL-MCNC: 9 MG/DL — SIGNIFICANT CHANGE UP (ref 8.6–10.2)
CHLORIDE SERPL-SCNC: 90 MMOL/L — LOW (ref 98–107)
CO2 SERPL-SCNC: 46 MMOL/L — CRITICAL HIGH (ref 22–29)
COLOR FLD: ABNORMAL
CREAT SERPL-MCNC: 1.15 MG/DL — SIGNIFICANT CHANGE UP (ref 0.5–1.3)
EOSINOPHIL # BLD AUTO: 0.3 K/UL — SIGNIFICANT CHANGE UP (ref 0–0.5)
EOSINOPHIL NFR BLD AUTO: 4.3 % — SIGNIFICANT CHANGE UP (ref 0–6)
FLUID INTAKE SUBSTANCE CLASS: SIGNIFICANT CHANGE UP
FLUID SEGMENTED GRANULOCYTES: 23 % — SIGNIFICANT CHANGE UP
GAS PNL BLDA: SIGNIFICANT CHANGE UP
GLUCOSE BLDC GLUCOMTR-MCNC: 142 MG/DL — HIGH (ref 70–99)
GLUCOSE BLDC GLUCOMTR-MCNC: 158 MG/DL — HIGH (ref 70–99)
GLUCOSE BLDC GLUCOMTR-MCNC: 180 MG/DL — HIGH (ref 70–99)
GLUCOSE BLDC GLUCOMTR-MCNC: 206 MG/DL — HIGH (ref 70–99)
GLUCOSE SERPL-MCNC: 179 MG/DL — HIGH (ref 70–115)
GRAM STN FLD: SIGNIFICANT CHANGE UP
HCT VFR BLD CALC: 24.9 % — LOW (ref 37–47)
HGB BLD-MCNC: 8.2 G/DL — LOW (ref 12–16)
INR BLD: 1.22 RATIO — HIGH (ref 0.88–1.16)
LYMPHOCYTES # BLD AUTO: 1.2 K/UL — SIGNIFICANT CHANGE UP (ref 1–4.8)
LYMPHOCYTES # BLD AUTO: 18.6 % — LOW (ref 20–55)
LYMPHOCYTES # FLD: 38 % — SIGNIFICANT CHANGE UP
MAGNESIUM SERPL-MCNC: 1.9 MG/DL — SIGNIFICANT CHANGE UP (ref 1.8–2.6)
MCHC RBC-ENTMCNC: 27.6 PG — SIGNIFICANT CHANGE UP (ref 27–31)
MCHC RBC-ENTMCNC: 32.9 G/DL — SIGNIFICANT CHANGE UP (ref 32–36)
MCV RBC AUTO: 83.8 FL — SIGNIFICANT CHANGE UP (ref 81–99)
MESOTHL CELL # FLD: 15 % — SIGNIFICANT CHANGE UP
MONOCYTES # BLD AUTO: 0.5 K/UL — SIGNIFICANT CHANGE UP (ref 0–0.8)
MONOCYTES NFR BLD AUTO: 8 % — SIGNIFICANT CHANGE UP (ref 3–10)
MONOS+MACROS # FLD: 24 % — SIGNIFICANT CHANGE UP
NEUTROPHILS # BLD AUTO: 4.6 K/UL — SIGNIFICANT CHANGE UP (ref 1.8–8)
NEUTROPHILS NFR BLD AUTO: 68.7 % — SIGNIFICANT CHANGE UP (ref 37–73)
PH FLD: 8.5 — SIGNIFICANT CHANGE UP
PHOSPHATE SERPL-MCNC: 2.2 MG/DL — LOW (ref 2.4–4.7)
PLATELET # BLD AUTO: 210 K/UL — SIGNIFICANT CHANGE UP (ref 150–400)
POTASSIUM SERPL-MCNC: 4.1 MMOL/L — SIGNIFICANT CHANGE UP (ref 3.5–5.3)
POTASSIUM SERPL-SCNC: 4.1 MMOL/L — SIGNIFICANT CHANGE UP (ref 3.5–5.3)
PROTHROM AB SERPL-ACNC: 14.1 SEC — HIGH (ref 10–12.9)
RBC # BLD: 2.97 M/UL — LOW (ref 4.4–5.2)
RBC # FLD: 20.6 % — HIGH (ref 11–15.6)
RCV VOL RI: HIGH /UL (ref 0–5)
SODIUM SERPL-SCNC: 142 MMOL/L — SIGNIFICANT CHANGE UP (ref 135–145)
SPECIMEN SOURCE: SIGNIFICANT CHANGE UP
TOTAL NUCLEATED CELL COUNT, BODY FLUID: 60 /UL — HIGH (ref 0–5)
TUBE TYPE: SIGNIFICANT CHANGE UP
WBC # BLD: 6.7 K/UL — SIGNIFICANT CHANGE UP (ref 4.8–10.8)
WBC # FLD AUTO: 6.7 K/UL — SIGNIFICANT CHANGE UP (ref 4.8–10.8)

## 2018-12-20 PROCEDURE — 99223 1ST HOSP IP/OBS HIGH 75: CPT

## 2018-12-20 PROCEDURE — 99497 ADVNCD CARE PLAN 30 MIN: CPT

## 2018-12-20 PROCEDURE — 71045 X-RAY EXAM CHEST 1 VIEW: CPT | Mod: 26,76

## 2018-12-20 PROCEDURE — 32554 ASPIRATE PLEURA W/O IMAGING: CPT | Mod: RT

## 2018-12-20 PROCEDURE — 93010 ELECTROCARDIOGRAM REPORT: CPT

## 2018-12-20 PROCEDURE — 99233 SBSQ HOSP IP/OBS HIGH 50: CPT

## 2018-12-20 PROCEDURE — 99232 SBSQ HOSP IP/OBS MODERATE 35: CPT

## 2018-12-20 PROCEDURE — 99221 1ST HOSP IP/OBS SF/LOW 40: CPT | Mod: 24

## 2018-12-20 RX ORDER — MAGNESIUM SULFATE 500 MG/ML
1 VIAL (ML) INJECTION ONCE
Qty: 0 | Refills: 0 | Status: COMPLETED | OUTPATIENT
Start: 2018-12-20 | End: 2018-12-20

## 2018-12-20 RX ADMIN — Medication 100 MILLIGRAM(S): at 05:15

## 2018-12-20 RX ADMIN — DULOXETINE HYDROCHLORIDE 60 MILLIGRAM(S): 30 CAPSULE, DELAYED RELEASE ORAL at 12:46

## 2018-12-20 RX ADMIN — ISOSORBIDE MONONITRATE 60 MILLIGRAM(S): 60 TABLET, EXTENDED RELEASE ORAL at 12:46

## 2018-12-20 RX ADMIN — Medication 100 GRAM(S): at 10:25

## 2018-12-20 RX ADMIN — INSULIN GLARGINE 7 UNIT(S): 100 INJECTION, SOLUTION SUBCUTANEOUS at 21:12

## 2018-12-20 RX ADMIN — Medication 81 MILLIGRAM(S): at 12:46

## 2018-12-20 RX ADMIN — GABAPENTIN 100 MILLIGRAM(S): 400 CAPSULE ORAL at 21:12

## 2018-12-20 RX ADMIN — ATORVASTATIN CALCIUM 40 MILLIGRAM(S): 80 TABLET, FILM COATED ORAL at 21:12

## 2018-12-20 RX ADMIN — Medication 40 MILLIGRAM(S): at 05:17

## 2018-12-20 RX ADMIN — Medication 3 UNIT(S): at 12:44

## 2018-12-20 RX ADMIN — Medication 2: at 09:17

## 2018-12-20 RX ADMIN — GABAPENTIN 100 MILLIGRAM(S): 400 CAPSULE ORAL at 05:16

## 2018-12-20 RX ADMIN — CARVEDILOL PHOSPHATE 25 MILLIGRAM(S): 80 CAPSULE, EXTENDED RELEASE ORAL at 05:16

## 2018-12-20 RX ADMIN — Medication 1: at 12:45

## 2018-12-20 RX ADMIN — Medication 100 MILLIGRAM(S): at 17:48

## 2018-12-20 RX ADMIN — Medication 3 UNIT(S): at 09:18

## 2018-12-20 RX ADMIN — Medication 3 UNIT(S): at 17:46

## 2018-12-20 RX ADMIN — CARVEDILOL PHOSPHATE 25 MILLIGRAM(S): 80 CAPSULE, EXTENDED RELEASE ORAL at 17:47

## 2018-12-20 RX ADMIN — PANTOPRAZOLE SODIUM 40 MILLIGRAM(S): 20 TABLET, DELAYED RELEASE ORAL at 05:16

## 2018-12-20 RX ADMIN — CLOPIDOGREL BISULFATE 75 MILLIGRAM(S): 75 TABLET, FILM COATED ORAL at 12:46

## 2018-12-20 RX ADMIN — Medication 40 MILLIGRAM(S): at 17:47

## 2018-12-20 NOTE — PROGRESS NOTE ADULT - PROBLEM SELECTOR PLAN 1
Patient most likely volume depleted at this time. Which could be the cause of syncopal event.   Will change to torsemide 40 mg qd.

## 2018-12-20 NOTE — CHART NOTE - NSCHARTNOTEFT_GEN_A_CORE
Rapid Response PGY 1 Note  Patient is a 63y old  Female         admitted for   Rapid response team called because patient was found unresponsive on the toilet. Patient was seen and examined at the bedside by the rapid response team. Nurse reported patient asked to go to the bathroom, was on the commode, found to be unresponsive, then transferred to chair and then bed. Patient was placed on supplemental O2 via bag mask then placed on bipap max pressure 26/6 40% fio2 with . Patient started opening her eyes and responding to verbal commands. patient has no acute complaints. pt denies SOB, CP, palpitations, abd pain.     Allergies: Accupril (Other)    Intolerances    PAST MEDICAL & SURGICAL HISTORY:  Herniated disc, cervical  PAD (peripheral artery disease)  Legally blind  History of peripheral vascular disease  History of retinal detachment  History of CEA (carotid endarterectomy): Right  Hyperlipidemia  Glaucoma  Hypertension  Diabetes  S/P CABG x 1  After cataract  Uveitic glaucoma of both eyes  Detached retina  Atherosclerosis of right carotid artery   delivery delivered      Vital Signs Last 24 Hrs    GENERAL: The patient is awake, alert, oriented to name, place and time.   HEENT: Head is normocephalic and atraumatic. Extraocular muscles are intact. Rt eye cataract. Mucous membranes are moist. No throat erythema/exudates no lymphadenopathy, no JVD,   LUNGS: Clear to auscultation BL without wheezing, rales or rhonchi; respirations unlabored  HEART: Regular rate and rhythm ,+S1/+S2, no murmurs, rubs, gallops  ABDOMEN: Soft, nontender, and nondistended, no rebound, guarding rigidity, bowel sounds in all 4 quadrants  EXTREMITIES: Without any cyanosis, clubbing, rash, lesions or edema.  SKIN: No new rashes or lesions.  MSK: strength equal BL  VASCULAR: Radial and Dorsal pedal pulses palpable BL  NEUROLOGIC: Grossly intact.  PSYCH: No new changes.    - @ 07:01  -  12- @ 07:00  --------------------------------------------------------  IN: 480 mL / OUT: 0 mL / NET: 480 mL                              8.2    6.7   )-----------( 210      ( 20 Dec 2018 06:32 )             24.9     12-20    142  |  90<L>  |  51.0<H>  ----------------------------<  179<H>  4.1   |  46.0<HH>  |  1.15    Ca    9.0      20 Dec 2018 06:32  Phos  2.2     12-20  Mg     1.9     12-20                    MEDICATIONS  (STANDING):  aspirin  chewable 81 milliGRAM(s) Oral daily  atorvastatin 40 milliGRAM(s) Oral at bedtime  carvedilol 25 milliGRAM(s) Oral every 12 hours  clopidogrel Tablet 75 milliGRAM(s) Oral daily  dextrose 5%. 1000 milliLiter(s) (50 mL/Hr) IV Continuous <Continuous>  dextrose 50% Injectable 12.5 Gram(s) IV Push once  dextrose 50% Injectable 25 Gram(s) IV Push once  dextrose 50% Injectable 25 Gram(s) IV Push once  docusate sodium 100 milliGRAM(s) Oral two times a day  DULoxetine 60 milliGRAM(s) Oral daily  epoetin sara Injectable 94512 Unit(s) SubCutaneous <User Schedule>  gabapentin 100 milliGRAM(s) Oral three times a day  insulin glargine Injectable (LANTUS) 7 Unit(s) SubCutaneous at bedtime  insulin lispro (HumaLOG) corrective regimen sliding scale   SubCutaneous three times a day before meals  insulin lispro Injectable (HumaLOG) 3 Unit(s) SubCutaneous three times a day with meals  isosorbide   mononitrate ER Tablet (IMDUR) 60 milliGRAM(s) Oral daily  metolazone 2.5 milliGRAM(s) Oral daily  pantoprazole    Tablet 40 milliGRAM(s) Oral before breakfast  torsemide 40 milliGRAM(s) Oral two times a day    MEDICATIONS  (PRN):  acetaminophen   Tablet .. 650 milliGRAM(s) Oral every 6 hours PRN Temp greater or equal to 38C (100.4F), Mild Pain (1 - 3)  bisacodyl Suppository 10 milliGRAM(s) Rectal daily PRN Constipation  dextrose 40% Gel 15 Gram(s) Oral once PRN Blood Glucose LESS THAN 70 milliGRAM(s)/deciliter  glucagon  Injectable 1 milliGRAM(s) IntraMuscular once PRN Glucose LESS THAN 70 milligrams/deciliter  lactulose Syrup 10 Gram(s) Oral three times a day PRN colnstipaiton      Assessment- Rapid Response called for 63y year old Female with a past medical history of     Plan- Rapid Response PGY 1 Note  Patient is a 63y old female with CAD s/p CABG and PCI, PAD s/p balloon angioplasty, diabetes mellitus, hypertension, hyperlipidemia admitted with complaints of worsening exertional shortness of breath.   Rapid response team called because patient was found unresponsive on the toilet. Patient was seen and examined at the bedside by the rapid response team and Dr. Coronado. Nurse reported patient asked to go to the bathroom, was on the commode, found to be unresponsive, then transferred to chair and then bed. Patient was placed on supplemental O2 via bag mask then placed on bipap max pressure 26/6 40% fio2 with . Patient started opening her eyes and responding to verbal commands. patient has no acute complaints. pt denies SOB, CP, palpitations, abd pain.       Allergies: Accupril (Other)    Intolerances    PAST MEDICAL & SURGICAL HISTORY:  Herniated disc, cervical  PAD (peripheral artery disease)  Legally blind  History of peripheral vascular disease  History of retinal detachment  History of CEA (carotid endarterectomy): Right  Hyperlipidemia  Glaucoma  Hypertension  Diabetes  S/P CABG x 1  After cataract  Uveitic glaucoma of both eyes  Detached retina  Atherosclerosis of right carotid artery   delivery delivered      Vital Signs:  08:15 HR 84; SR; RR 20; 82% O2  08:25 /60  08:30 /59; HR 76; SR; RR 22; 100% O2    GENERAL: The patient is awake, alert, oriented to name, place and time.   HEENT: Head is normocephalic and atraumatic. Rt eye cataract.  LUNGS: Clear to auscultation BL without wheezing, rales or rhonchi; respirations unlabored, difficult exam due to body habitus  HEART: Regular rate and rhythm ,+S1/+S2, no murmurs, rubs, gallops  ABDOMEN: Soft, nontender, and nondistended, no rebound, guarding rigidity, bowel sounds in all 4 quadrants  EXTREMITIES: Without any cyanosis, clubbing, rash, lesions or edema.  SKIN: No new rashes or lesions.  NEUROLOGIC: Grossly intact. not agitated, AAOx3    - @ 07:01  -   @ 07:00  --------------------------------------------------------  IN: 480 mL / OUT: 0 mL / NET: 480 mL                              8.2    6.7   )-----------( 210      ( 20 Dec 2018 06:32 )             24.9     12-20    142  |  90<L>  |  51.0<H>  ----------------------------<  179<H>  4.1   |  46.0<HH>  |  1.15    Ca    9.0      20 Dec 2018 06:32  Phos  2.2     12-20  Mg     1.9     12-20                    MEDICATIONS  (STANDING):  aspirin  chewable 81 milliGRAM(s) Oral daily  atorvastatin 40 milliGRAM(s) Oral at bedtime  carvedilol 25 milliGRAM(s) Oral every 12 hours  clopidogrel Tablet 75 milliGRAM(s) Oral daily  dextrose 5%. 1000 milliLiter(s) (50 mL/Hr) IV Continuous <Continuous>  dextrose 50% Injectable 12.5 Gram(s) IV Push once  dextrose 50% Injectable 25 Gram(s) IV Push once  dextrose 50% Injectable 25 Gram(s) IV Push once  docusate sodium 100 milliGRAM(s) Oral two times a day  DULoxetine 60 milliGRAM(s) Oral daily  epoetin sara Injectable 26733 Unit(s) SubCutaneous <User Schedule>  gabapentin 100 milliGRAM(s) Oral three times a day  insulin glargine Injectable (LANTUS) 7 Unit(s) SubCutaneous at bedtime  insulin lispro (HumaLOG) corrective regimen sliding scale   SubCutaneous three times a day before meals  insulin lispro Injectable (HumaLOG) 3 Unit(s) SubCutaneous three times a day with meals  isosorbide   mononitrate ER Tablet (IMDUR) 60 milliGRAM(s) Oral daily  metolazone 2.5 milliGRAM(s) Oral daily  pantoprazole    Tablet 40 milliGRAM(s) Oral before breakfast  torsemide 40 milliGRAM(s) Oral two times a day    MEDICATIONS  (PRN):  acetaminophen   Tablet .. 650 milliGRAM(s) Oral every 6 hours PRN Temp greater or equal to 38C (100.4F), Mild Pain (1 - 3)  bisacodyl Suppository 10 milliGRAM(s) Rectal daily PRN Constipation  dextrose 40% Gel 15 Gram(s) Oral once PRN Blood Glucose LESS THAN 70 milliGRAM(s)/deciliter  glucagon  Injectable 1 milliGRAM(s) IntraMuscular once PRN Glucose LESS THAN 70 milligrams/deciliter  lactulose Syrup 10 Gram(s) Oral three times a day PRN colnstipaiton      Assessment:  Patient is a 63y old female with CAD s/p CABG and PCI, PAD s/p balloon angioplasty, diabetes mellitus, hypertension, hyperlipidemia admitted with complaints of worsening exertional shortness of breath. Rapid response called for unresponsiveness possibly secondary to vasovagal. Patient is responsive without any acute neurological changes, hemodynamically stable, afebrile without any respiratory distress    Plan  - continue supplemental O2 per primary team  - f/u ABG with lytes, CXR  - EKG unchanged from previous EKG, inverted T-waves  - monitor VS and RR  - continue care per primary team  - c/w SROC Dr. Talamantes, Dr. Coronado  - debriefing done with medical team

## 2018-12-20 NOTE — PROGRESS NOTE ADULT - ASSESSMENT
Assess    Echo reveals mild pulm HTN, mod MR  OHS/KYREE/Diastolic HF and significant MR with hypercarbia and recurrent effusion  CAD  Restrictive pulmonary impairment with chronic hypercarbic respiratory failure with progressive impairment  Vasovagal syncope  Possible aspiration  Tx'd out of ICU      Plan    Avoid xs hydration  Optimize cardiac function  Nocturnal and prn NIV due to progressive nature of her disease and to lower PCO2 and to avoid recurrent hospitalization  02 as needed   Pleurex drainage   NIV with Trilogy rather than Bipap on discharge due to the progressive nature of her condition to lower PC02 and to prevent recurrent hospitalization  Bipap was considered but rejected due to the progressive nature of the patient's condition   PSG and PFTs as outpt Assess    Echo reveals mild pulm HTN, mod MR  OHS/KYREE/Diastolic HF and significant MR with hypercarbia and recurrent effusion  CAD  Restrictive pulmonary impairment with chronic hypercarbic respiratory failure with progressive impairment  Vasovagal syncope  Possible aspiration  Tx'd out of ICU      Plan    Avoid xs hydration  Optimize cardiac function  Nocturnal and prn NIV due to progressive nature of her disease and to lower PCO2 and to avoid recurrent hospitalization  02 as needed   Pleurex drainage evaluation  NIV with Trilogy rather than Bipap on discharge due to the progressive nature of her condition to lower PC02 and to prevent recurrent hospitalization  Bipap was considered but rejected due to the progressive nature of the patient's condition   Palliative input advised  PSG and PFTs as outpt

## 2018-12-20 NOTE — PROGRESS NOTE ADULT - SUBJECTIVE AND OBJECTIVE BOX
PULMONARY PROGRESS NOTE      KUN ROCHA  MRN-[id number]    Patient is a 63y old  Female who presents with a chief complaint of Shortness of breath (18 Dec 2018 15:23)  Acute on chronic diastolic HF  KEITH - resolved  DM  HTN  CAD  OHS/KYREE with hypercarbic respiratory failure  Last IR drainage of pleural effusion on       INTERVAL HPI/OVERNIGHT EVENTS:    Events noted  Recurrent respiratory distress  Recurrent effusions  Willing to use AVAPS/BiPAP - Doesn't like the noise from Bipap  Progressive, recurrent nature of her condition makes Bipap inadequate    MEDICATIONS  (STANDING):  aspirin  chewable 81 milliGRAM(s) Oral daily  atorvastatin 40 milliGRAM(s) Oral at bedtime  carvedilol 25 milliGRAM(s) Oral every 12 hours  clopidogrel Tablet 75 milliGRAM(s) Oral daily  dextrose 5%. 1000 milliLiter(s) (50 mL/Hr) IV Continuous <Continuous>  dextrose 50% Injectable 12.5 Gram(s) IV Push once  dextrose 50% Injectable 25 Gram(s) IV Push once  dextrose 50% Injectable 25 Gram(s) IV Push once  docusate sodium 100 milliGRAM(s) Oral two times a day  DULoxetine 60 milliGRAM(s) Oral daily  epoetin sara Injectable 62606 Unit(s) SubCutaneous <User Schedule>  gabapentin 100 milliGRAM(s) Oral three times a day  insulin glargine Injectable (LANTUS) 7 Unit(s) SubCutaneous at bedtime  insulin lispro (HumaLOG) corrective regimen sliding scale   SubCutaneous three times a day before meals  isosorbide   mononitrate ER Tablet (IMDUR) 60 milliGRAM(s) Oral daily  metolazone 2.5 milliGRAM(s) Oral daily  pantoprazole    Tablet 40 milliGRAM(s) Oral before breakfast  potassium chloride    Tablet ER 40 milliEquivalent(s) Oral once  torsemide 40 milliGRAM(s) Oral two times a day      MEDICATIONS  (PRN):  acetaminophen   Tablet .. 650 milliGRAM(s) Oral every 6 hours PRN Temp greater or equal to 38C (100.4F), Mild Pain (1 - 3)  bisacodyl Suppository 10 milliGRAM(s) Rectal daily PRN Constipation  dextrose 40% Gel 15 Gram(s) Oral once PRN Blood Glucose LESS THAN 70 milliGRAM(s)/deciliter  glucagon  Injectable 1 milliGRAM(s) IntraMuscular once PRN Glucose LESS THAN 70 milligrams/deciliter  lactulose Syrup 10 Gram(s) Oral three times a day PRN colnstipaiton      Allergies    Accupril (Other)    Intolerances        PAST MEDICAL & SURGICAL HISTORY:  Herniated disc, cervical  PAD (peripheral artery disease)  Legally blind  History of peripheral vascular disease  History of retinal detachment  History of CEA (carotid endarterectomy): Right  Hyperlipidemia  Glaucoma  Hypertension  Diabetes  S/P CABG x 1  After cataract  Uveitic glaucoma of both eyes  Detached retina  Atherosclerosis of right carotid artery   delivery delivered        REVIEW OF SYSTEMS:    CONSTITUTIONAL:  No distress    HEENT:  Eyes:  No diplopia or blurred vision. ENT:  No earache, sore throat or runny nose.    CARDIOVASCULAR:  No pressure, squeezing, tightness, heaviness or aching about the chest; no palpitations.    RESPIRATORY:  No cough, improved shortness of breath, no PND or orthopnea. Mild SOBOE    GASTROINTESTINAL:  No nausea, vomiting or diarrhea.    GENITOURINARY:  No dysuria, frequency or urgency.    NEUROLOGIC:  No paresthesias, fasciculations, seizures or weakness.    PSYCHIATRIC:  No disorder of thought or mood.    Vital Signs Last 24 Hrs  T(C): 36.7 (18 Dec 2018 14:30), Max: 37.1 (17 Dec 2018 18:05)  T(F): 98.1 (18 Dec 2018 14:30), Max: 98.7 (17 Dec 2018 18:05)  HR: 67 (18 Dec 2018 14:30) (62 - 69)  BP: 120/58 (18 Dec 2018 14:30) (101/53 - 150/68)  BP(mean): 82 (18 Dec 2018 13:00) (67 - 98)  RR: 20 (18 Dec 2018 14:30) (12 - 37)  SpO2: 97% (18 Dec 2018 14:30) (92% - 100%)    PHYSICAL EXAMINATION:    GENERAL: The patient is awake and alert in no apparent distress.     HEENT: Head is normocephalic and atraumatic. Extraocular muscles are intact. Mucous membranes are moist.    NECK: Supple.    LUNGS: Clear to auscultation without wheezing, rales or rhonchi; respirations unlabored    HEART: Regular rate and rhythm with mild murmur.    ABDOMEN: Soft, nontender, and nondistended.      EXTREMITIES: Without any cyanosis, clubbing, rash, with trace edema.    NEUROLOGIC: Grossly intact.    LABS:                        8.0    6.8   )-----------( 226      ( 18 Dec 2018 06:12 )             24.1     18    142  |  91<L>  |  55.0<H>  ----------------------------<  173<H>  3.8   |  43.0<H>  |  1.36<H>    Ca    8.6      18 Dec 2018 06:12  Phos  2.2       Mg     1.9     18      PT/INR - ( 17 Dec 2018 03:48 )   PT: 17.5 sec;   INR: 1.50 ratio         PTT - ( 17 Dec 2018 03:48 )  PTT:33.8 sec    ABG - ( 17 Dec 2018 14:18 )  pH, Arterial: 7.45  pH, Blood: x     /  pCO2: 70    /  pO2: 62    / HCO3: 46    / Base Excess: 21.4  /  SaO2: 91                CARDIAC MARKERS ( 17 Dec 2018 08:15 )  x     / 0.08 ng/mL / 40 U/L / x     / x      CARDIAC MARKERS ( 17 Dec 2018 08:14 )  x     / 0.09 ng/mL / x     / x     / x      CARDIAC MARKERS ( 17 Dec 2018 03:48 )  x     / 0.09 ng/mL / 45 U/L / x     / x            Serum Pro-Brain Natriuretic Peptide: 9880 pg/mL (18 @ 08:15)      RADIOLOGY & ADDITIONAL STUDIES:       EXAM:  XR CHEST PORTABLE IMMED 1V                          PROCEDURE DATE:  2018          INTERPRETATION:  Chest radiograph (one view)     CPT 03628    CLINICAL INFORMATION:       Moderate shortness of breath since today.    TECHNIQUE:  Single frontal view of the chest was obtained.    FINDINGS:  Prior study dated 2018 was available for review.    The lungs demonstrate a moderate-sized right pleural effusion. This is   shown an increase in size from prior study. Underlying infiltrate and/or   atelectasis cannot be excluded. The heart is prominent in size.  Patient   is status post median sternotomy and coronary artery bypass graft.      IMPRESSION: Prominent cardiac silhouette.   Increasing right pleural   effusion when compared to prior study dated 2018.           MADAN CRAVEN M.D., ATTENDING RADIOLOGIST  This document has been electronically signed. Dec 16 2018 12:01PM            ECHO:      Summary:   1. Left ventricular ejection fraction, by visual estimation, is 65 to   70%.   2. Normal global left ventricular systolic function.   3. There is mild concentric left ventricular hypertrophy.   4. Moderately enlarged right ventricle.   5. Moderately reduced RV systolic function.   6. There is no evidence of pericardial effusion.   7. Moderate mitral valve regurgitation.   8. Thickening of the anterior and posterior mitral valve leaflets.   9. Moderate tricuspid regurgitation.  10. Sclerotic aortic valve with normal opening.  11. Trace pulmonic valve regurgitation.  12. Estimated pulmonary artery systolic pressure is 45.7 mmHg assuming a   right atrial pressure of 8 mmHg, which is consistent with mild pulmonary   hypertension.    [id number] Phoenix Alvarado MD, Electronically signed on 2018 at 8:26:51   PM

## 2018-12-20 NOTE — CONSULT NOTE ADULT - ASSESSMENT
KEITH h/o HF was on HCTZ and toresemide prior to coming to hosp  Had dec appetite as well suspect some degree of hypoperfusion  Will give trial of IVF but she will ultimately need diuretics  suspect some degree of underlying CKD from HTN/ DM   Renal sono noted L kidney not well visualized/ R side no hydronephrosis ( suboptimal study)  HyperKalemia 2/2 KEITH will give kayexalate/ Ca Gluc IV  Pleural effusion plans for thoracentesis    Will follow
63 yr woman,  with CAD s/p CABG and PCI, PAD s/p balloon angioplasty, KYREE on BiPap admitted with hypercapnic respiratory failure related to  CHF, KYREE/OHS.
62 y/o female presents with worsening dyspnea and increased abdominal girth, found to have elevated pro-BNP and CXR with increased vascular markings.
62 y/o female with PMHx of CAD s/p CABG x1 Stent placement x2,  PAD s/p balloon angioplasty, DM2, HTN, HLD came to the ED complaining of worsening dyspnea with minimal exertion and increasing abdominal girth over the past 2 weeks. IR was consulted for pleural effusion, underwent right thoracentesis 12/12, 1500cc removed. On 12/17, found unresponsive, code blue was called CPR initiated. Patient regained consciousness. Patient again transfered to ICU. Syncope work up completed.  CT head: non-remarkable. Pt downgraded back to medical floor. 12/20 RRT called for pt unresponsive on the toilet. Patient placed on supplemental O2 via bag mask then placed on bipap and began responding and following command. Thoracic surgery consulted for drainage of right sided pleural effusion seen on AM CXR.
Assess    Chronic Hypercarbic Ventilatory failure related to restrictive pulmonary impairment  Inadequate response to Bipap  Related diastolic dysfunction with pulmonary hypertension  The patient will require NIV at home to lower her PCO2 and to avoid hospitalizations    Plan    Bipap for now  02  Discussed NIV with discharge planning
63F with multiple co-morbidities, including KYREE, OHV, Severe Pulmonary HTN, Chronic hypercapnia, non-compliance with home Bipap, CAD, no presents after what appears to be another syncopal episode. During this event however, her pulse was difficult to detect and CPR was initiated briefly. Of most concern is the severity of her pulmonary HTN, Cor Pulmonale and severe chronic hypercapnia. This syncopal event may be related to the hypercapnia, especially in the face of not wearing her Bipap. Which the patient refused overnight.  There were no events on the telemetry monitor of note. Will complete syncope evaluation.  with Head CT and EEG today.

## 2018-12-20 NOTE — CONSULT NOTE ADULT - PROBLEM SELECTOR RECOMMENDATION 2
Cardiology consulted.   s/p thoracentesis - plan for Pleurex
-continue ASA/Plavix  -continue Coreg/Lipitor/Imdur  -Hgb A1C, lipid panel in am  -nuclear stress test when euvolemic
Check ABG and monitore ETCO2  Needs to be compliant with nocturnal NIPPV. Would opt for AVAPS now.  Pulmonary Follow-up  Mobilization  Minimize sedating medication

## 2018-12-20 NOTE — CONSULT NOTE ADULT - PROBLEM SELECTOR RECOMMENDATION 5
Called - he will be here later this afternoon. Patient with long hospitalization with 2 RR . It appears, patient is easily tipped given her multiple medical issues, especially related to her pulm/cardiac condition.  Discussed with Dr. Coronado- will try to arrange family meeting tomorrow

## 2018-12-20 NOTE — CONSULT NOTE ADULT - CONSULT REQUESTED DATE/TIME
20-Dec-2018 13:49
03-Dec-2018 13:04
17-Dec-2018 09:30
20-Dec-2018 11:30
25-Nov-2018 21:00
06-Dec-2018 10:52

## 2018-12-20 NOTE — PROGRESS NOTE ADULT - SUBJECTIVE AND OBJECTIVE BOX
CHIEF COMPLAINT/INTERVAL HISTORY:    Patient is a 63y old  Female who presents with a chief complaint of Shortness of breath (20 Dec 2018 14:57)      HPI:  62 y/o female with PMHx of CAD s/p CABG x1 Stent placement x2,  PAD s/p balloon angioplasty, DM2, HTN, HLD came to the ED complaining of worsening dyspnea with minimal exertion and increasing abdominal girth over the past 2 weeks. She usually ambulate with walker since R foot heel & 3 toes amputation last December without difficulty but lately has been having difficulty ambulating due to shortness of breath. She also reported generalized pain in her abdomen radiating to the left flank due to swelling in her abdomen. She has no chest pain, palpitation, orthopnea, cough, fever, chills, change in bowel/urinary habit, nausea, vomiting, recent travel, calf pain. Patient endorsed compliance to medication and diet. (25 Nov 2018 22:56)      SUBJECTIVE & OBJECTIVE/ ROS: Pt seen and examined at bedside. Rapid response this am for syncope during BM  & hypoxia. Pt seen during the rapid. Regained consiousness quickly with no confusion. Currently AAO x 3 moving all extremities. Junctional rhythm prior to the rapid but not during. Pt desaturates to 83% during the event. Rest vitals stable. No chest pain, palpitations, sob, light headedness/dizziness, difficulty breathing/cough, fevers/chills, abdominal pain, n/v, diarrhea/constipation, dysuria or increased urinary frequency.      ICU Vital Signs Last 24 Hrs  T(C): 36.4 (20 Dec 2018 15:09), Max: 37.1 (20 Dec 2018 05:13)  T(F): 97.6 (20 Dec 2018 15:09), Max: 98.7 (20 Dec 2018 05:13)  HR: 69 (20 Dec 2018 15:09) (68 - 85)  BP: 92/53 (20 Dec 2018 15:09) (92/53 - 154/62)  BP(mean): --  ABP: --  ABP(mean): --  RR: 18 (20 Dec 2018 15:09) (16 - 20)  SpO2: 100% (20 Dec 2018 15:09) (97% - 100%)        MEDICATIONS  (STANDING):  aspirin  chewable 81 milliGRAM(s) Oral daily  atorvastatin 40 milliGRAM(s) Oral at bedtime  carvedilol 25 milliGRAM(s) Oral every 12 hours  clopidogrel Tablet 75 milliGRAM(s) Oral daily  dextrose 5%. 1000 milliLiter(s) (50 mL/Hr) IV Continuous <Continuous>  dextrose 50% Injectable 12.5 Gram(s) IV Push once  dextrose 50% Injectable 25 Gram(s) IV Push once  dextrose 50% Injectable 25 Gram(s) IV Push once  docusate sodium 100 milliGRAM(s) Oral two times a day  DULoxetine 60 milliGRAM(s) Oral daily  epoetin sara Injectable [id number] Unit(s) SubCutaneous <User Schedule>  gabapentin 100 milliGRAM(s) Oral three times a day  insulin glargine Injectable (LANTUS) 7 Unit(s) SubCutaneous at bedtime  insulin lispro (HumaLOG) corrective regimen sliding scale   SubCutaneous three times a day before meals  insulin lispro Injectable (HumaLOG) 3 Unit(s) SubCutaneous three times a day with meals  isosorbide   mononitrate ER Tablet (IMDUR) 60 milliGRAM(s) Oral daily  pantoprazole    Tablet 40 milliGRAM(s) Oral before breakfast  torsemide 40 milliGRAM(s) Oral daily    MEDICATIONS  (PRN):  acetaminophen   Tablet .. 650 milliGRAM(s) Oral every 6 hours PRN Temp greater or equal to 38C (100.4F), Mild Pain (1 - 3)  bisacodyl Suppository 10 milliGRAM(s) Rectal daily PRN Constipation  dextrose 40% Gel 15 Gram(s) Oral once PRN Blood Glucose LESS THAN 70 milliGRAM(s)/deciliter  glucagon  Injectable 1 milliGRAM(s) IntraMuscular once PRN Glucose LESS THAN 70 milligrams/deciliter  lactulose Syrup 10 Gram(s) Oral three times a day PRN colnstipaiton      LABS:                        8.2    6.7   )-----------( 210      ( 20 Dec 2018 06:32 )             24.9     12-20    142  |  90<L>  |  51.0<H>  ----------------------------<  179<H>  4.1   |  46.0<HH>  |  1.15    Ca    9.0      20 Dec 2018 06:32  Phos  2.2     12-20  Mg     1.9     12-20      PT/INR - ( 20 Dec 2018 13:35 )   PT: 14.1 sec;   INR: 1.22 ratio               CAPILLARY BLOOD GLUCOSE      POCT Blood Glucose.: 158 mg/dL (20 Dec 2018 12:12)  POCT Blood Glucose.: 206 mg/dL (20 Dec 2018 08:18)  POCT Blood Glucose.: 274 mg/dL (19 Dec 2018 20:49)  POCT Blood Glucose.: 217 mg/dL (19 Dec 2018 17:01)      RECENT CULTURES:      RADIOLOGY & ADDITIONAL TESTS:      PHYSICAL EXAM:    GENERAL: Awake and responsive, not in acute distress, morbidly obese   EYES: Pupils equal, reactive to light.  Symmetric.  EARS, NOSE, THROAT: Normal; supple neck, no lymphadenopathy; trachea midline  Lungs: Clear to auscultation bilaterally  CVS: Regular rate and rhythm, s1, s2  GI: Soft, nondistended, nontender, normoactive bowel sounds, no masses, no guarding  EXTREMITIES: No edema  SKIN: Warm and well perfused, no rashes noted.  NEURO: Alert, oriented, respond to verbal command

## 2018-12-20 NOTE — PROGRESS NOTE ADULT - ASSESSMENT
Improved KEITH   Continue the current diuretics   CHF (EF= 70%; mild pulm HTN)==> diastolic dysfunction  Renal sono no hydro  Renal fcn stable with diuresis   Torsemide reduced   Will d/c Metolazone   Right pigtail for effusion today Improved KEITH   Continue the current diuretics   CHF (EF= 70%; mild pulm HTN)==> diastolic dysfunction  Renal sono no hydro  Renal fcn stable with diuresis   Worsening contraction alkalosis on chronic hypercarbia   Torsemide reduced   Will d/c Metolazone   Right pigtail for effusion today   Labs in am

## 2018-12-20 NOTE — CONSULT NOTE ADULT - SUBJECTIVE AND OBJECTIVE BOX
Palliative Medicine Initial Consultation Note    HPI:  History from chart- patient on Bipap  62 y/o female with PMHx of CAD s/p CABG x1 Stent placement x2,  PAD s/p balloon angioplasty, DM2, HTN, HLD came to the ED complaining of worsening dyspnea with minimal exertion and increasing abdominal girth over the past 2 weeks.   Hospital course:  Patient  admitted with CHF, found to have inferior ischemia on stress test. Issue of CO2 narcosis needing NIV.  Hospital course complicated by Rapid Response felt related to syncope. Patient s/p thoracentesis    PERTINENT PMH REVIEWED:  [x ] YES [ ] NO          Past Medical History:  Diabetes    Glaucoma    Herniated disc, cervical    History of CEA (carotid endarterectomy)  Right  History of peripheral vascular disease    History of retinal detachment    Hyperlipidemia    Hypertension    Legally blind    PAD (peripheral artery disease).     Past Surgical History:  After cataract    Atherosclerosis of right carotid artery     delivery delivered    Detached retina    S/P CABG x 1    Uveitic glaucoma of both eyes.    SOCIAL HISTORY:  EtOH [ ] Yes  [x ] No                              Drugs [ ] Yes [ x] No                             [ ] smoker [ x] nonsmoker                              Admitted from: [ ] home [ ] SNF _________ [ ] [facility] ________    Surrogate/HCP/Guardian: [x] YES [ ] NO                                Phone#:    FAMILY HISTORY:  Family history of diabetes mellitus (Father, Mother)  Family history of essential hypertension (Father, Mother)      Baseline ADLs (prior to admission):  Unable to obtain - patient on Bipap,   Independent [ ] moderately [ ] fully   Dependent   [ ] moderately [ ]fully    MEDICATIONS  (STANDING):  aspirin  chewable 81 milliGRAM(s) Oral daily  atorvastatin 40 milliGRAM(s) Oral at bedtime  carvedilol 25 milliGRAM(s) Oral every 12 hours  clopidogrel Tablet 75 milliGRAM(s) Oral daily  dextrose 5%. 1000 milliLiter(s) (50 mL/Hr) IV Continuous <Continuous>  dextrose 50% Injectable 12.5 Gram(s) IV Push once  dextrose 50% Injectable 25 Gram(s) IV Push once  dextrose 50% Injectable 25 Gram(s) IV Push once  docusate sodium 100 milliGRAM(s) Oral two times a day  DULoxetine 60 milliGRAM(s) Oral daily  epoetin sara Injectable 26992 Unit(s) SubCutaneous <User Schedule>  gabapentin 100 milliGRAM(s) Oral three times a day  insulin glargine Injectable (LANTUS) 7 Unit(s) SubCutaneous at bedtime  insulin lispro (HumaLOG) corrective regimen sliding scale   SubCutaneous three times a day before meals  insulin lispro Injectable (HumaLOG) 3 Unit(s) SubCutaneous three times a day with meals  isosorbide   mononitrate ER Tablet (IMDUR) 60 milliGRAM(s) Oral daily  metolazone 2.5 milliGRAM(s) Oral daily  pantoprazole    Tablet 40 milliGRAM(s) Oral before breakfast  torsemide 40 milliGRAM(s) Oral daily    MEDICATIONS  (PRN):  acetaminophen   Tablet .. 650 milliGRAM(s) Oral every 6 hours PRN Temp greater or equal to 38C (100.4F), Mild Pain (1 - 3)  bisacodyl Suppository 10 milliGRAM(s) Rectal daily PRN Constipation  dextrose 40% Gel 15 Gram(s) Oral once PRN Blood Glucose LESS THAN 70 milliGRAM(s)/deciliter  glucagon  Injectable 1 milliGRAM(s) IntraMuscular once PRN Glucose LESS THAN 70 milligrams/deciliter  lactulose Syrup 10 Gram(s) Oral three times a day PRN colnstipaiton      Allergies    Accupril (Other)    Intolerances      REVIEW OF SYSTEMS   - see HPI    [x ] Unable to obtain due to poor mentation     Karnofsky Performance Score/Palliative Performance Status Version 2: 30  %    Vital Signs Last 24 Hrs  T(C): 36.9 (20 Dec 2018 10:30), Max: 37.1 (20 Dec 2018 05:13)  T(F): 98.5 (20 Dec 2018 10:30), Max: 98.7 (20 Dec 2018 05:13)  HR: 79 (20 Dec 2018 11:40) (68 - 85)  BP: 141/65 (20 Dec 2018 10:30) (137/58 - 154/62)  BP(mean): --  RR: 18 (20 Dec 2018 10:30) (16 - 20)  SpO2: 99% (20 Dec 2018 11:40) (97% - 100%)    PHYSICAL EXAM:    General: Obese woman, on Bipap - sleepy wakes up with communicaiton  HEENT: [x ] normal  [ ] dry mouth  [ ] ET tube/trach    Lungs: [ x] comfortable on Bipap  CV: [ x] normal  [ ] tachycardia    GI: [ x] normal  [ ] distended  [ ] tender  [ ] no BS               [ ] PEG/NG/OG tube    : [x ] normal  [ ] incontinent  [ ] oliguria/anuria  [ ] fontenot    MSK: [ ] normal  [ x] weakness  [ ] edema             [ ] ambulatory  [ ] bedbound/wheelchair bound    Skin: [ ] normal  [ ] pressure ulcers- Stage_____  [x ] no rash    LABS:                        8.2    6.7   )-----------( 210      ( 20 Dec 2018 06:32 )             24.9     12    142  |  90<L>  |  51.0<H>  ----------------------------<  179<H>  4.1   |  46.0<HH>  |  1.15    Ca    9.0      20 Dec 2018 06:32  Phos  2.2     12  Mg     1.9               I&O's Summary    19 Dec 2018 07:  -  20 Dec 2018 07:00  --------------------------------------------------------  IN: 480 mL / OUT: 0 mL / NET: 480 mL    20 Dec 2018 07:  -  20 Dec 2018 13:49  --------------------------------------------------------  IN: 400 mL / OUT: 0 mL / NET: 400 mL    RADIOLOGY & ADDITIONAL STUDIES:  < from: CT Chest w/ IV Cont (18 @ 18:25) >  INTERPRETATION:  [facility] RADIOLOGIST PRELIMINARY REPORT  Reviewed by VASILIY Fonseca M.D.  EXAM:    CT Chest With Contrast     EXAM DATE/TIME:    2018 6:13 PM     CLINICAL HISTORY:    63 years old, female; Pain; Abdominal pain; Generalized; Chest pain     TECHNIQUE:    Axial computed tomography images of the chest with intravenous contrast.    Coronal reformatted images were created and reviewed.     COMPARISON:   CT Abdomen^ABDOMEN PELVIS NC (Adult) 2018 9:40 PM     FINDINGS:    Lungs:  Bilateral perihilar groundglass opacities may represent   pulmonary   edema.    Pleural space:  Moderate right pleural effusion/atelectasis.Very small   left   pleural effusion/atelectasis.    Heart:  Cardiomegaly.    Aorta: Normal. No aortic aneurysm.    Lymph nodes: Unremarkable. No enlarged lymph nodes.    Bones/joints: Unremarkable. No acute fracture.    Soft tissues: Body wall anasarca.   Liver:  Hepatic steatosis.    Kidneys and ureters:  Right nephrolithiasis.    Intraperitoneal space:  Perihepatic and perisplenic ascites.     IMPRESSION:   1. Moderate right pleural effusion/atelectasis.Very small left pleural   effusion/atelectasis.   2. Bilateral perihilar groundglass opacities may represent pulmonary   edema.      < end of copied text >    < from: CT Head No Cont (18 @ 12:59) >   EXAM:  CT BRAIN                          PROCEDURE DATE:  2018          INTERPRETATION:  CLINICAL HISTORY: Syncope    COMPARISON: CT head dated 2011    TECHNIQUE: Noncontrast CT of the head. Multiplanar reformations are   submitted.    FINDINGS:  Old left thalamic lacunar infarct. Old right thalamic lacunar infarct.  There is periventricular and subcortical white matter hypodensity without   mass effect, nonspecific, likely representing moderate chronic   microvascular ischemic changes. There is no compelling evidence for an   acute transcortical infarction. There is no evidence of mass, mass   effect, midline shift or extra-axial fluid collection. The lateral   ventricles and cortical sulci are age-appropriate in size and   configuration.     Status post silicone injection of the bilateral ocular globes. The   patient is status post bilateral ocular lens replacement surgery. Status   post bilateral ocular globe banding. The orbits, mastoid air cells and   visualized paranasal sinuses are otherwise unremarkable. The calvarium is   intact.    IMPRESSION:  Moderate chronic microvascular changes without evidence of   an acute transcortical infarction or hemorrhage. MR is a more sensitive   imaging modality for the evaluation of an acute infarction.             < end of copied text >        ADVANCE DIRECTIVES:  [ ] YES [x ] NO   DNR [ ] YES [ x] NO  Completed on:                     MOLST  [ ] YES [x ] NO   Completed on:  Living Will  [ ] YES [ x] NO   Completed on:    Thank you for the opportunity to assist with the care of this patient.   [facility] Consult [facility] [phone].
History obtained by: Patient and medical record     obtained: No    Chief complaint:  "I can't breathe"    HPI:  This is 64 y/o female with hx of CAD, s/p CABG x1 ([facility]->LAD 2015), MADHAVI x2-> LCX & OM1 (2015), PAD s/p balloon angioplasty, DM2, HTN, HLD who presents with worsening dyspnea and increasing abdominal girth over the past 2 weeks. Pt is mostly bedridden, with limited mobility since R foot heel & 3 toes amputation last December. Pt had a prolonged hospital stay last year ([facility]), followed by several months in rehab.   She denies chest pain, palpitations or syncope, uses several pillows to sleep. Pt states she's been compliant with her medications and adheres to low-salt diet. She follows up with Dr. Recio and was referred for TTE and stress test on the last visit but hasn't completed these tests yet.   In the Er pt was found to have pro-BNP 3460 and received Lasix 40 mg IVP. Troponin was negative. EKG shows SR with inverted T-waves in inferior leads.      REVIEW OF SYMPTOMS:   Cardiovascular: as per HPI  Respiratory:  c/o dyspnea,  denies cough,     Genitourinary:  No dysuria, no hematuria;   Gastrointestinal:   No dark color stool, no melena, no diarrhea, no constipation, no abdominal pain;   Neurological: No headache, no dizziness, no slurred speech;    Psychiatric: No agitation, no anxiety.  ALL OTHER REVIEW OF SYSTEMS ARE NEGATIVE.    MEDICATIONS  (home):  Micardis 80-12.5 mg  ASA 81 mg  Plavix 75 mg  Metformin 850 mg bid  Duloxetine 60 mg  Coreg 25 mg bid  Imdur 30 mg  Magnesium oxide 400 mg  Pantoprazole 40 mg bid  Lipitor 80 mg  Lasix 30 mg  Gabapentin 100 mg tid        PAST MEDICAL & SURGICAL HISTORY:  Herniated disc, cervical  PAD (peripheral artery disease)  Legally blind  History of peripheral vascular disease  History of retinal detachment  History of CEA (carotid endarterectomy): Right  Hyperlipidemia  Glaucoma  Hypertension  Diabetes  S/P CABG x 1  After cataract  Uveitic glaucoma of both eyes  Detached retina  Atherosclerosis of right carotid artery   delivery delivered      FAMILY HISTORY:  No pertinent family history in first degree relatives      SOCIAL HISTORY: lives with family    CIGARETTES: never smoked    ALCOHOL: denies    DRUGS: denies    Vital Signs Last 24 Hrs  T(C): 36.7 (2018 18:12), Max: 36.7 (2018 18:12)  T(F): 98 (2018 18:12), Max: 98 (2018 18:12)  HR: 70 (2018 22:04) (70 - 76)  BP: 162/69 (2018 22:04) (162/69 - 210/85)  BP(mean): --  RR: 20 (2018 19:37) (20 - 22)  SpO2: 100% (2018 21:06) (87% - 100%)    PHYSICAL EXAM:  General: WN/WD NAD  Neurology: A&Ox3, nonfocal, GAN x 4  Eyes: L pupil RRL/ R pupil white, Gross vision intact  ENT/Neck: Neck supple, trachea midline, No JVD, Gross hearing intact  Respiratory: diminished, scattered basilar rales  CV: RRR, S1S2, no murmurs  Abdominal: distended, nontender  Extremities: poor peripheral pulses, R foot with 3 amputated toes  Skin: No Rashes, Hematoma, Ecchymosis        INTERPRETATION OF TELEMETRY: SR 60's-70's    ECG: low voltage, SR 70 bpm, inverted T-waves in inferior leads      I&O's Detail      LABS:                        10.7   7.4   )-----------( 237      ( 2018 19:39 )             31.3     11-    132<L>  |  96<L>  |  31.0<H>  ----------------------------<  173<H>  5.7<H>   |  30.0<H>  |  0.98    Ca    8.6      2018 19:39  Mg     2.3         TPro  7.8  /  Alb  3.5  /  TBili  0.9  /  DBili  x   /  AST  41<H>  /  ALT  49<H>  /  AlkPhos  276<H>      CARDIAC MARKERS ( 2018 19:39 )  x     / 0.04 ng/mL / x     / x     / x          PT/INR - ( 2018 19:39 )   PT: 14.1 sec;   INR: 1.22 ratio         PTT - ( 2018 19:39 )  PTT:28.9 sec    I&O's Summary      RADIOLOGY & ADDITIONAL STUDIES:  X-ray:    PREVIOUS DIAGNOSTIC TESTING:      ECHO  < from: TTE Echo Complete w/Doppler (17 @ 08:32) >   Summary:   1. The left atrium is normal in size.   2. There is mild concentric left ventricular hypertrophy.   3. Left ventricular ejection fraction, by visual estimation, is 70 to   75%. Grade I diastolic dysfunction.   4. The right atrium is normal in size.   5. RV not well visualized. Mildy enlarged RV. Moderately reduced RV   systolic function.   6. Mild mitral valve regurgitation.   7. There is no evidence of pericardial effusion.   8. Repeat study with definity and complete RV evaluation.     Ely Silva MD Electronically signed on 2017 at 10:25:15 PM    < end of copied text >      STRESS        CATHETERIZATION  < from: Cardiac Cath Lab - Adult (08.21.15 @ 09:46) >  VENTRICLES: No LV gram was performed; however, a recent echocardiogram  demonstrated an EF of 55 %.  CORONARY VESSELS: The coronary circulation is right dominant.  LM:   --  Distal left main: There was a 50 % stenosis.  LAD:   --  LAD: There was a 100 % stenosis. With competitive flow noted.  CX:   --  Proximal circumflex: There was a 80 % stenosis.  --  OM1: There was a 80 % stenosis.  RI:   --  Ramus intermedius: There was a 60 % stenosis.  RCA:   --  Mid RCA: There was a 80 % stenosis.  --  Distal RCA: There was a 90 % stenosis.  --  RPDA: Small caliber, diffusely diseased vessel.  LEFT LOWER EXTREMITY VESSELS: Left lower extremity angiography reveals  severe atherosclerosis. Left superficial femoral: There was a diffuse 100  % stenosis. Left anterior tibial: Normal. Left posterior tibial: There was  a 100 % stenosis. Left peroneal: Normal.  COMPLICATIONS: No complications occurred during the cath lab visit.  SUMMARY:  LEFT LOWER EXTREMITY VESSELS: Left lower extremity angiography reveals  severe atherosclerosis.  Summary: Addendum 2015: Case reconfirmedto add Diagnostic Cath  procedures to Diagnostic report and add Dr Recio as Diagnostic  Cardiologist  DIAGNOSTIC IMPRESSIONS: Severe native circumflex and RCA disease. PCI  performed to circumflex. RCA too diffusley diseased with inadequate  outflow.  Severe L SFA disease.  DIAGNOSTIC RECOMMENDATIONS: Medical management of RCA disease.  Plan for staged left leg intervention in 2-3 weeks.  INTERVENTIONAL IMPRESSIONS: Severe native circumflex and RCA disease. PCI  performed to circumflex. RCA too diffusley diseased with inadequate  outflow.  Severe L SFA disease.  INTERVENTIONAL RECOMMENDATIONS: Medical management of RCA disease.  Plan for staged left leg intervention in 2-3 weeks.  Prepared and signed by  Los Recio MD  Signed 2015 12:28:07    < end of copied text >
History of Present Illness:  64 y/o female with PMHx of CAD s/p CABG x1 Stent placement x2,  PAD s/p balloon angioplasty, DM2, HTN, HLD came to the ED complaining of worsening dyspnea with minimal exertion and increasing abdominal girth over the past 2 weeks. She usually ambulates with walker without difficulty but lately has been having difficulty ambulating due to shortness of breath.  Cardiology following patient for acute on chronic diastolic CHF. Stress test, showed inferior ischemia, medical management advised and right heart cath showed RV dysfunction. Nephrology consulted, for contrast induced nephropathy. IR was consulted for pleural effusion, underwent right thoracentesis , 1500cc removed. On , found unresponsive, code blue was called CPR initiated. Patient regained consciousness.  ABG following the event showed compensated respiratory acidosis, with severe hypercapnia. Patient again transfered to ICU. Syncope work up completed. EEG with No epileptiform or seizure activity during this monitoring period. CT head: non-remarkable. Pt downgraded back to medical floor.  RRT called for pt unresponsive on the toilet. Patient placed on supplemental O2 via bag mask then placed on bipap and began responding and following command. Thoracic surgery consulted for drainage of right sided pleural effusion seen on AM CXR.     Upon bedside assessment pt currently lying flat on BiPAP 26/6 40% FiO2% with  tolerating it well. She is AOx3, denies CP, orthopnea, SOB, NVD.     Antiplatelet therapy: None    Past Medical History  Herniated disc, cervical  PAD (peripheral artery disease)  Legally blind  History of peripheral vascular disease  History of retinal detachment  History of CEA (carotid endarterectomy): Right  Hyperlipidemia  Glaucoma  Hypertension  Diabetes      Past Surgical History  S/P CABG x 1  After cataract  Uveitic glaucoma of both eyes  Detached retina  Atherosclerosis of right carotid artery   delivery delivered      MEDICATIONS  (STANDING):  aspirin  chewable 81 milliGRAM(s) Oral daily  atorvastatin 40 milliGRAM(s) Oral at bedtime  carvedilol 25 milliGRAM(s) Oral every 12 hours  clopidogrel Tablet 75 milliGRAM(s) Oral daily  dextrose 5%. 1000 milliLiter(s) (50 mL/Hr) IV Continuous <Continuous>  dextrose 50% Injectable 12.5 Gram(s) IV Push once  dextrose 50% Injectable 25 Gram(s) IV Push once  dextrose 50% Injectable 25 Gram(s) IV Push once  docusate sodium 100 milliGRAM(s) Oral two times a day  DULoxetine 60 milliGRAM(s) Oral daily  epoetin sara Injectable 55735 Unit(s) SubCutaneous <User Schedule>  gabapentin 100 milliGRAM(s) Oral three times a day  insulin glargine Injectable (LANTUS) 7 Unit(s) SubCutaneous at bedtime  insulin lispro (HumaLOG) corrective regimen sliding scale   SubCutaneous three times a day before meals  insulin lispro Injectable (HumaLOG) 3 Unit(s) SubCutaneous three times a day with meals  isosorbide   mononitrate ER Tablet (IMDUR) 60 milliGRAM(s) Oral daily  metolazone 2.5 milliGRAM(s) Oral daily  pantoprazole    Tablet 40 milliGRAM(s) Oral before breakfast  torsemide 40 milliGRAM(s) Oral two times a day    MEDICATIONS  (PRN):  acetaminophen   Tablet .. 650 milliGRAM(s) Oral every 6 hours PRN Temp greater or equal to 38C (100.4F), Mild Pain (1 - 3)  bisacodyl Suppository 10 milliGRAM(s) Rectal daily PRN Constipation  dextrose 40% Gel 15 Gram(s) Oral once PRN Blood Glucose LESS THAN 70 milliGRAM(s)/deciliter  glucagon  Injectable 1 milliGRAM(s) IntraMuscular once PRN Glucose LESS THAN 70 milligrams/deciliter  lactulose Syrup 10 Gram(s) Oral three times a day PRN colnstipaiton                                Allergies: Accupril (Other)      Social History:   Non smoker   Uses walker to ambulate    Relevant Family History  FAMILY HISTORY:  Family history of diabetes mellitus (Father, Mother)  Family history of essential hypertension (Father, Mother)      Review of Systems  GENERAL:  Fevers[] chills[] sweats[] fatigue[] weight loss[] weight gain []                                       [x] NEGATIVE  NEURO:  parathesias[] seizures []  syncope [x]confusion []                                                                 [] NEGATIVE                 EYES: glasses[]  blurry vision[]  discharge[] pain[] glaucoma []                                                           [x] NEGATIVE                            CV:  chest pain[] palpitations[] BOONE [] diaphoresis [] edema[x                                                           [ ] NEGATIVE                                 RESPIRATORY:  wheezing[] SOB[x cough [] sputum[] hemoptysis[]                                                   [ ] NEGATIVE                                           MUSKULOSKELETAL:  arthritis[ ]  joint swelling [ ] muscle weakness [ ]                                           [x] NEGATIVE                     SKIN/BREAST:  rash[ ] itching [ ]  hair loss[ ] masses[ ]                                                                         [x NEGATIVE                                         HEME/LYMPH:  bruises easily[ ] enlarged lymph nodes[ ] tender lymph nodes[ ]                          [ xNEGATIVE                      ENDOCRINE:  cold intolerance[ ] heat intolerance[ ] polydipsia[ ]                                                     [x] NEGATIVE                        Physical Exam:   Vital Signs Last 24 Hrs  T(C): 36.9 (20 Dec 2018 10:30), Max: 37.1 (20 Dec 2018 05:13)  T(F): 98.5 (20 Dec 2018 10:30), Max: 98.7 (20 Dec 2018 05:13)  HR: 79 (20 Dec 2018 11:40) (68 - 85)  BP: 141/65 (20 Dec 2018 10:30) (137/58 - 154/62)  BP(mean): --  RR: 18 (20 Dec 2018 10:30) (16 - 20)  SpO2: 99% (20 Dec 2018 11:40) (97% - 100%)      General: Well nourished, well developed, no acute distress.                                                                      ENT: Normal exam of nasal/oral mucosa with absence of cyanosis.   Neck: Normal exam of jugular veins, trachea & thyroid.   Chest: Right lung base diminished with good air movement bilaterally.  CV:  Regular rate and rhythm, without gallops, clicks or murmurs, no carotid bruits noted.                                                                                                                                                      Extremities: Normal no evidence of cyanosis or deformity, 2+demal.  Lower Extremities: Positive bilateral LE pulses, without varicosities  SKIN : Normal exam to inspection & palpation. Old sternal incision noted.                                                          Labs:                         8.2    6.7   )-----------( 210      ( 20 Dec 2018 06:32 )             24.9     12-20    142  |  90<L>  |  51.0<H>  ----------------------------<  179<H>  4.1   |  46.0<HH>  |  1.15    Ca    9.0      20 Dec 2018 06:32  Phos  2.2     12-20  Mg     1.9     12-20      CXR:   < from: Xray Chest 1 View-PORTABLE IMMEDIATE (.18 @ 11:48) >    IMPRESSION: Prominent cardiac silhouette.   Increasing right pleural   effusion when compared to prior study dated 2018.     < end of copied text >
PULMONARY CONSULT NOTE      RICARDO ROCHACHEPE-[id number]    Patient is a 63y old  Female who presents with a chief complaint of Shortness of breath (02 Dec 2018 14:09)      HISTORY OF PRESENT ILLNESS:    Hypercarbia  Obesity  Restrictive pulmonary impairment  Diastolic HF  Effusions    MEDICATIONS  (STANDING):  aspirin  chewable 81 milliGRAM(s) Oral daily  atorvastatin 80 milliGRAM(s) Oral at bedtime  carvedilol 25 milliGRAM(s) Oral every 12 hours  clopidogrel Tablet 75 milliGRAM(s) Oral daily  dextrose 5%. 1000 milliLiter(s) (50 mL/Hr) IV Continuous <Continuous>  dextrose 50% Injectable 12.5 Gram(s) IV Push once  dextrose 50% Injectable 25 Gram(s) IV Push once  dextrose 50% Injectable 25 Gram(s) IV Push once  docusate sodium 100 milliGRAM(s) Oral two times a day  DULoxetine 60 milliGRAM(s) Oral daily  enoxaparin Injectable 40 milliGRAM(s) SubCutaneous daily  furosemide   Injectable 40 milliGRAM(s) IV Push every 12 hours  gabapentin 100 milliGRAM(s) Oral three times a day  insulin glargine Injectable (LANTUS) 7 Unit(s) SubCutaneous at bedtime  insulin lispro (HumaLOG) corrective regimen sliding scale   SubCutaneous three times a day before meals  isosorbide   mononitrate ER Tablet (IMDUR) 60 milliGRAM(s) Oral daily  losartan 50 milliGRAM(s) Oral daily  nitroglycerin    Patch 0.4 mG/Hr(s) 1 patch Transdermal daily  pantoprazole    Tablet 40 milliGRAM(s) Oral before breakfast      MEDICATIONS  (PRN):  acetaminophen   Tablet .. 650 milliGRAM(s) Oral every 6 hours PRN Temp greater or equal to 38C (100.4F), Mild Pain (1 - 3)  dextrose 40% Gel 15 Gram(s) Oral once PRN Blood Glucose LESS THAN 70 milliGRAM(s)/deciliter  glucagon  Injectable 1 milliGRAM(s) IntraMuscular once PRN Glucose LESS THAN 70 milligrams/deciliter  oxyCODONE    IR 5 milliGRAM(s) Oral every 6 hours PRN Moderate Pain (4 - 6)      Allergies    Accupril (Other)    Intolerances        PAST MEDICAL & SURGICAL HISTORY:  Herniated disc, cervical  PAD (peripheral artery disease)  Legally blind  History of peripheral vascular disease  History of retinal detachment  History of CEA (carotid endarterectomy): Right  Hyperlipidemia  Glaucoma  Hypertension  Diabetes  S/P CABG x 1  After cataract  Uveitic glaucoma of both eyes  Detached retina  Atherosclerosis of right carotid artery   delivery delivered      FAMILY HISTORY:  Family history of diabetes mellitus (Father, Mother)  Family history of essential hypertension (Father, Mother)      SOCIAL HISTORY  Smoking History:     REVIEW OF SYSTEMS:    CONSTITUTIONAL:  No fevers, chills, sweats    HEENT:  Eyes:  No diplopia or blurred vision. ENT:  No earache, sore throat or runny nose.    CARDIOVASCULAR:  No pressure, squeezing, tightness, or heaviness about the chest; no palpitations.    RESPIRATORY:  No cough,  PND or orthopnea. Mild SOBOE    GASTROINTESTINAL:  No abdominal pain, nausea, vomiting or diarrhea.    GENITOURINARY:  No dysuria, frequency or urgency.    NEUROLOGIC:  No paresthesias, fasciculations, seizures or weakness.    PSYCHIATRIC:  No disorder of thought or mood.    Vital Signs Last 24 Hrs  T(C): 36.6 (03 Dec 2018 11:34), Max: 37 (02 Dec 2018 14:23)  T(F): 97.9 (03 Dec 2018 11:34), Max: 98.6 (02 Dec 2018 14:23)  HR: 62 (03 Dec 2018 11:34) (61 - 70)  BP: 132/66 (03 Dec 2018 11:34) (122/56 - 145/72)  BP(mean): --  RR: 16 (03 Dec 2018 11:34) (16 - 16)  SpO2: 100% (03 Dec 2018 11:47) (98% - 100%)    PHYSICAL EXAMINATION:    GENERAL: The patient is a well-developed, well-nourished _____in no apparent distress.     HEENT: Head is normocephalic and atraumatic. Extraocular muscles are intact. Mucous membranes are moist.     NECK: Supple.     LUNGS: Diminished to auscultation without wheezing, rales, or rhonchi. Respirations unlabored    HEART: Regular rate and rhythm without murmur.    ABDOMEN: Soft, nontender, and nondistended.  No hepatosplenomegaly is noted.    EXTREMITIES: Without any cyanosis, clubbing, rash, lesions or edema.    NEUROLOGIC: Grossly intact.      LABS:        139  |  97<L>  |  52.0<H>  ----------------------------<  120<H>  4.8   |  34.0<H>  |  1.06    Ca    8.6      03 Dec 2018 07:10  Phos  3.7     12-  Mg     2.0     12-          ABG - ( 03 Dec 2018 10:49 )  pH, Arterial: 7.37  pH, Blood: x     /  pCO2: 66    /  pO2: 76    / HCO3: 34    / Base Excess: 10.8  /  SaO2: 95            RADIOLOGY & ADDITIONAL STUDIES:    CTA on   IMPRESSION:   1. Moderate right pleural effusion/atelectasis.Very small left pleural   effusion/atelectasis.   2. Bilateral perihilar groundglass opacities may represent pulmonary   edema.      TATE PRESTON M.D., ATTENDING RADIOLOGIST  This document has been electronically signed. Dec  2 2018  7:59AM      Echo on 18   1. Left ventricular ejection fraction, by visual estimation, is 65 to   70%.   2. Normal global left ventricular systolic function.   3. There is mild concentric left ventricular hypertrophy.   4. Moderately enlarged right ventricle.   5. Moderately reduced RV systolic function.   6. There is no evidence of pericardial effusion.   7. Moderate mitral valve regurgitation.   8. Thickening of the anterior and posterior mitral valve leaflets.   9. Moderate tricuspid regurgitation.  10. Sclerotic aortic valve with normal opening.  11. Trace pulmonic valve regurgitation.  12. Estimated pulmonary artery systolic pressure is 45.7 mmHg assuming a   right atrial pressure of 8 mmHg, which is consistent with mild pulmonary   hypertension.    [id number] Phoenix Alvarado MD, Electronically signed on 2018 at 8:26:51   PM
Patient is a 63y old  Female who presents with a chief complaint of Shortness of breath (17 Dec 2018 16:05)      BRIEF HOSPITAL COURSE: 63F with KYREE/OHV, Morbi dobesity, Chronic Hypercapnia, Severe Pulmonary HTN, CAD, PVD, S/p CABG, on Home Bipap (Non-compliant) admitted for Syncope. Noted to have a witnessed syncopal event this morning. UNclear if lost pulse. CPR initiated. Upon my arrival CPR was in progress. Pulse detected and rhythm noted on monitor as Sinus Tach.  BP noted to be 170.  Upon review of telemetry strips, the patient did not lose her rhythm. BG normal.   O2 sats 95%. EKG without ischemic change and no compliants of Chest pain. The patient re-gain consciousness and was oriented x 2. ABG following the event showed and compensated respiratory acidosis, with severe hypercapnia. This was immediately following the patient receiving BMV by the RT. Of note, the patient refused wearing her Bipap overnight.      PAST MEDICAL & SURGICAL HISTORY:  Herniated disc, cervical  PAD (peripheral artery disease)  Legally blind  History of peripheral vascular disease  History of retinal detachment  History of CEA (carotid endarterectomy): Right  Hyperlipidemia  Glaucoma  Hypertension  Diabetes  S/P CABG x 1  After cataract  Uveitic glaucoma of both eyes  Detached retina  Atherosclerosis of right carotid artery   delivery delivered    Allergies    Accupril (Other)    Intolerances      FAMILY HISTORY:  Family history of diabetes mellitus (Father, Mother)  Family history of essential hypertension (Father, Mother)      Family history otherwise noncontributory.    Social History: FOrmer smoker No ETOH   Review of Systems:    ALL OTHER REVIEW OF SYSTEMS EXCEPT PER HPI NEGATIVE.      Medications:    carvedilol 25 milliGRAM(s) Oral every 12 hours  isosorbide   mononitrate ER Tablet (IMDUR) 60 milliGRAM(s) Oral daily  metolazone 2.5 milliGRAM(s) Oral daily  torsemide 40 milliGRAM(s) Oral two times a day      acetaminophen   Tablet .. 650 milliGRAM(s) Oral every 6 hours PRN  DULoxetine 60 milliGRAM(s) Oral daily  gabapentin 100 milliGRAM(s) Oral three times a day      aspirin  chewable 81 milliGRAM(s) Oral daily  clopidogrel Tablet 75 milliGRAM(s) Oral daily    bisacodyl Suppository 10 milliGRAM(s) Rectal daily PRN  docusate sodium 100 milliGRAM(s) Oral two times a day  lactulose Syrup 10 Gram(s) Oral three times a day PRN  pantoprazole    Tablet 40 milliGRAM(s) Oral before breakfast      atorvastatin 40 milliGRAM(s) Oral at bedtime  dextrose 40% Gel 15 Gram(s) Oral once PRN  dextrose 50% Injectable 12.5 Gram(s) IV Push once  dextrose 50% Injectable 25 Gram(s) IV Push once  dextrose 50% Injectable 25 Gram(s) IV Push once  glucagon  Injectable 1 milliGRAM(s) IntraMuscular once PRN  insulin glargine Injectable (LANTUS) 7 Unit(s) SubCutaneous at bedtime  insulin lispro (HumaLOG) corrective regimen sliding scale   SubCutaneous three times a day before meals    dextrose 5%. 1000 milliLiter(s) IV Continuous <Continuous>    epoetin sara Injectable 60079 Unit(s) SubCutaneous <User Schedule>              ICU Vital Signs Last 24 Hrs  T(C): 36.7 (17 Dec 2018 04:42), Max: 36.7 (16 Dec 2018 22:48)  T(F): 98 (17 Dec 2018 04:42), Max: 98 (16 Dec 2018 22:48)  HR: 67 (17 Dec 2018 18:00) (62 - 67)  BP: 129/60 (17 Dec 2018 18:00) (110/53 - 145/63)  BP(mean): 87 (17 Dec 2018 18:00) (77 - 93)  ABP: --  ABP(mean): --  RR: 22 (17 Dec 2018 18:00) (18 - 27)  SpO2: 94% (17 Dec 2018 18:00) (93% - 100%)    Vital Signs Last 24 Hrs  T(C): 36.7 (17 Dec 2018 04:42), Max: 36.7 (16 Dec 2018 22:48)  T(F): 98 (17 Dec 2018 04:42), Max: 98 (16 Dec 2018 22:48)  HR: 67 (17 Dec 2018 18:00) (62 - 67)  BP: 129/60 (17 Dec 2018 18:00) (110/53 - 145/63)  BP(mean): 87 (17 Dec 2018 18:00) (77 - 93)  RR: 22 (17 Dec 2018 18:00) (18 - 27)  SpO2: 94% (17 Dec 2018 18:00) (93% - 100%)    ABG - ( 17 Dec 2018 14:18 )  pH, Arterial: 7.45  pH, Blood: x     /  pCO2: 70    /  pO2: 62    / HCO3: 46    / Base Excess: 21.4  /  SaO2: 91                  I&O's Detail    16 Dec 2018 07:01  -  17 Dec 2018 07:00  --------------------------------------------------------  IN:    Oral Fluid: 360 mL  Total IN: 360 mL    OUT:  Total OUT: 0 mL    Total NET: 360 mL      17 Dec 2018 07:01  -  17 Dec 2018 18:07  --------------------------------------------------------  IN:    Oral Fluid: 240 mL    Solution: 50 mL  Total IN: 290 mL    OUT:  Total OUT: 0 mL    Total NET: 290 mL            LABS:                        8.0    10.6  )-----------( 224      ( 17 Dec 2018 08:14 )             23.6     12-17    141  |  92<L>  |  61.0<H>  ----------------------------<  142<H>  3.9   |  43.0<H>  |  1.43<H>    Ca    8.8      17 Dec 2018 08:14  Phos  2.5     -  Mg     1.7     12-17        CARDIAC MARKERS ( 17 Dec 2018 08:15 )  x     / 0.08 ng/mL / 40 U/L / x     / x      CARDIAC MARKERS ( 17 Dec 2018 08:14 )  x     / 0.09 ng/mL / x     / x     / x      CARDIAC MARKERS ( 17 Dec 2018 03:48 )  x     / 0.09 ng/mL / 45 U/L / x     / x          CAPILLARY BLOOD GLUCOSE      POCT Blood Glucose.: 324 mg/dL (17 Dec 2018 16:37)    PT/INR - ( 17 Dec 2018 03:48 )   PT: 17.5 sec;   INR: 1.50 ratio         PTT - ( 17 Dec 2018 03:48 )  PTT:33.8 sec    CULTURES:      Physical Examination:    GENERAL: Awake and responsive,. breathing comfortably, No acute distress.      EYES: Pupils equal, reactive to light.  Symmetric.    EARS, NOSE, THROAT: Normal; supple neck, no lymphadenopathy; trachea midline    PULM: Clear to auscultation bilaterally, no significant sputum production    CVS: Regular rate and rhythm, no murmurs, rubs, or gallops    GI: Soft, nondistended, nontender, normoactive bowel sounds, no masses, no guarding    EXTREMITIES: No edema    SKIN: Warm and well perfused, no rashes noted.    NEURO: Alert, oriented, interactive, nonfocal    DEVICES:     RADIOLOGY: Head CT:   IMPRESSION:  Moderate chronic microvascular changes without evidence of   an acute transcortical infarction or hemorrhage. MR is a more sensitive   imaging modality for the evaluation of an acute infarction.           CRITICAL CARE TIME SPENT:  45 mins of time was spent from 0940-10:25 evaluating and treating this critical patient, including speaking with the staff, and reviewing all documentation
HPI:  62 y/o female with PMHx of CAD s/p CABG x1 Stent placement x2,  PAD s/p balloon angioplasty, DM2, HTN, HLD came to the ED complaining of worsening dyspnea with minimal exertion and increasing abdominal girth over the past 2 weeks. She usually ambulate with walker since R foot heel & 3 toes amputation last December without difficulty but lately has been having difficulty ambulating due to shortness of breath. She also reported generalized pain in her abdomen radiating to the left flank due to swelling in her abdomen. She has no chest pain, palpitation, orthopnea, cough, fever, chills, change in bowel/urinary habit, nausea, vomiting  ROS as above    PAST MEDICAL & SURGICAL HISTORY:  Herniated disc, cervical  PAD (peripheral artery disease)  Legally blind  History of peripheral vascular disease  History of retinal detachment  History of CEA (carotid endarterectomy): Right  Hyperlipidemia  Glaucoma  Hypertension  Diabetes  S/P CABG x 1  After cataract  Uveitic glaucoma of both eyes  Detached retina  Atherosclerosis of right carotid artery   delivery delivered      FAMILY HISTORY:  Family history of diabetes mellitus (Father, Mother)  Family history of essential hypertension (Father, Mother)  NC    Social History:Non smoker    MEDICATIONS  (STANDING):  aspirin  chewable 81 milliGRAM(s) Oral daily  atorvastatin 80 milliGRAM(s) Oral at bedtime  calcium gluconate IVPB 2 Gram(s) IV Intermittent once  carvedilol 25 milliGRAM(s) Oral every 12 hours  clopidogrel Tablet 75 milliGRAM(s) Oral daily  dextrose 5%. 1000 milliLiter(s) (50 mL/Hr) IV Continuous <Continuous>  dextrose 50% Injectable 12.5 Gram(s) IV Push once  dextrose 50% Injectable 25 Gram(s) IV Push once  dextrose 50% Injectable 25 Gram(s) IV Push once  DOBUTamine Infusion 1 MICROgram(s)/kG/Min (2.925 mL/Hr) IV Continuous <Continuous>  docusate sodium 100 milliGRAM(s) Oral two times a day  DULoxetine 60 milliGRAM(s) Oral daily  enoxaparin Injectable 40 milliGRAM(s) SubCutaneous daily  gabapentin 100 milliGRAM(s) Oral three times a day  insulin glargine Injectable (LANTUS) 7 Unit(s) SubCutaneous at bedtime  insulin lispro (HumaLOG) corrective regimen sliding scale   SubCutaneous three times a day before meals  isosorbide   mononitrate ER Tablet (IMDUR) 60 milliGRAM(s) Oral daily  nitroglycerin    Patch 0.4 mG/Hr(s) 1 patch Transdermal daily  pantoprazole    Tablet 40 milliGRAM(s) Oral before breakfast  sodium polystyrene sulfonate Suspension 30 Gram(s) Oral once    MEDICATIONS  (PRN):  acetaminophen   Tablet .. 650 milliGRAM(s) Oral every 6 hours PRN Temp greater or equal to 38C (100.4F), Mild Pain (1 - 3)  bisacodyl Suppository 10 milliGRAM(s) Rectal daily PRN Constipation  dextrose 40% Gel 15 Gram(s) Oral once PRN Blood Glucose LESS THAN 70 milliGRAM(s)/deciliter  glucagon  Injectable 1 milliGRAM(s) IntraMuscular once PRN Glucose LESS THAN 70 milligrams/deciliter  lactulose Syrup 10 Gram(s) Oral three times a day PRN colnstipaiton   Meds reviewed    Allergies    Accupril (Other)        Vital Signs Last 24 Hrs  T(C): 37 (05 Dec 2018 21:37), Max: 37 (05 Dec 2018 21:37)  T(F): 98.6 (05 Dec 2018 21:37), Max: 98.6 (05 Dec 2018 21:37)  HR: 68 (06 Dec 2018 08:38) (65 - 70)  BP: 110/65 (06 Dec 2018 08:38) (110/65 - 136/63)  BP(mean): --  RR: 18 (06 Dec 2018 05:05) (18 - 24)  SpO2: 100% (06 Dec 2018 05:05) (95% - 100%)  Daily     Daily Weight in k.5 (06 Dec 2018 05:05)    PHYSICAL EXAM:    GENERAL: appears chronically ill  HEAD:  NCAT  EYES: EOMI  NECK: Supple, neck  veins full  NERVOUS SYSTEM:  Alert & Oriented X3  CHEST/LUNG: dec BS B/L R>L  HEART: Regular rate and rhythm  ABDOMEN: Soft, Nontender,+ distention; Bowel sounds present  EXTREMITIES:  + LE edema      LABS:                        8.1    5.2   )-----------( 112      ( 06 Dec 2018 06:41 )             24.0     12-06    133<L>  |  94<L>  |  77.0<H>  ----------------------------<  89  5.4<H>   |  30.0<H>  |  2.77<H>    Ca    8.2<L>      06 Dec 2018 09:24  Mg     2.1           PT/INR - ( 05 Dec 2018 06:03 )   PT: 14.6 sec;   INR: 1.26 ratio         PTT - ( 05 Dec 2018 06:03 )  PTT:33.4 sec    Magnesium, Serum: 2.1 mg/dL ( @ 06:45)          RADIOLOGY & ADDITIONAL TESTS:

## 2018-12-20 NOTE — PROGRESS NOTE ADULT - ASSESSMENT
64 y/o female with CAD s/p CABG and PCI, PAD s/p balloon angioplasty, diabetes mellitus, hypertension, hyperlipidemia admitted with complaints of worsening exertional shortness of breath. She was evaluated by cardiology and started on IV diuretics, for acute on chronic diastolic CHF. Stress test was done, showed inferior ischemia, medical management advised. She was noted to have CO2 narcosis, placed on NIV. Cardiology planned for right heart catheterisation given RV dysfunction. Dobutamine was given for RV dysfunction. She was noted to have contrast induced nephropathy, diuretics were held. Nephrology consulted, Losartan was held. IR was consulted for pleural effusion, underwent right thoracentesis. RRT was called, for lethargy, related to hypercapnia, improved with NIV. Diuretics were reinstated given worsening symptoms. Her renal function improved. Venofer was added for anemia. PT consulted, advised SINGH. Given improvement in respiratory status, she was started on oral diuretics. Her CXR showed opacification of right lung. Metolazone was added by nephrology for fluid overload. IR consulted for right thoracentesis, she underwent thoracentesis on 12/12, 1500 cc of fluid was removed. On 12/17,found unresponsive, code blue was called CPR initiated. Patient regained consciousness.  ABG following the event showed  compensated respiratory acidosis, with severe hypercapnia. Patient again transfer to ICU placed on AVAPs. Syncope work up completed; EEG: indicative of mild generalized background   slowing.  No epileptiform or seizure activity during this monitoring period.  CT head: non-remarkable. Clinical correlation advised. Patient respiratory status improved, now downgraded back to medical floor.       Syncope and collapse x 2  Likely Vasovagal  Will monitor    Acute on chronic diastolic congestive heart failure  Decrease diuretics due to contraction alkalosis  Will change to torsemide 40 mg qd.   CXR with worsening pleural effusion. CTS contacted for possible drainage & pleurex to prevent recurrence  c/w coreg  Add ACE when tolerated  Cardio appreciated    Hypercapnic & Hypoxic respiratory failure/ OHS/ KYREE.   Nocturnal and prn Bipap - NIV preferred due to progressive nature of her disease and to lower PCO2 and to avoid recurrent hospitalization  02 as needed - off HFO2; now on NCO2  Pleurex drainage if effusion continues to recur  NIV with Trilogy rather than Bipap on discharge due to the progressive nature of her condition to lower PC02 and to prevent recurrent hospitalization  Bipap was considered but rejected due to the progressive nature of the patient's condition   PSG and PFTs as outpt  Pulmonary on board   Hypoxia during rapid today. ABG with mixed metabolic alkalosis & resp acidosis. Placed back on BiPAP. Decrease diuretics due to contraction alkalosis  CXR with worsening pleural effusion. CTS contacted for possible drainage & pleurex to prevent recurrence    KYREE/ OHS   Plan as above     CAD s/p CABG and stent   Continue Plavix 75mg and Aspirin 325mg     PAD s/p balloon angioplasty  Continue Plavix 75mg and Aspirin 325mg     HTN/HLD  Continue home medications   HbA1C and lipid profile in AM     DM-2  On metformin 850mg BID will hold   Insulin sliding scale     Supportive  DVT prophylaxis: CSD  Diet: DASH/TLC

## 2018-12-20 NOTE — PROCEDURE NOTE - NSPROCDETAILS_GEN_ALL_CORE
location identified, draped/prepped, sterile technique used, needle inserted/introduced/all materials/supplies accounted for at end of procedure
location identified, draped/prepped, sterile technique used, needle inserted/introduced/Seldinger technique/ultrasound assessment of effusion (localization)

## 2018-12-20 NOTE — CONSULT NOTE ADULT - PROBLEM SELECTOR PROBLEM 1
Hypercapnic respiratory failure
Acute on chronic diastolic congestive heart failure
Pleural effusion, right
Cardiac arrest

## 2018-12-20 NOTE — PROGRESS NOTE ADULT - ATTENDING COMMENTS
Discussed with daughter Cas [phone] about patient guarded prognosis. She states she wants her mom to be full code. WIll talk to pt's HCP

## 2018-12-20 NOTE — PROGRESS NOTE ADULT - SUBJECTIVE AND OBJECTIVE BOX
NEPHROLOGY INTERVAL HPI/OVERNIGHT EVENTS:    Events of am noted   feels better  now   Cardio follow up noted   loop diuretic reduced   net balance (-) 5.8 L   Currently getting right pigtail drain of effusion     MEDICATIONS  (STANDING):  aspirin  chewable 81 milliGRAM(s) Oral daily  atorvastatin 40 milliGRAM(s) Oral at bedtime  carvedilol 25 milliGRAM(s) Oral every 12 hours  clopidogrel Tablet 75 milliGRAM(s) Oral daily  dextrose 5%. 1000 milliLiter(s) (50 mL/Hr) IV Continuous <Continuous>  dextrose 50% Injectable 12.5 Gram(s) IV Push once  dextrose 50% Injectable 25 Gram(s) IV Push once  dextrose 50% Injectable 25 Gram(s) IV Push once  docusate sodium 100 milliGRAM(s) Oral two times a day  DULoxetine 60 milliGRAM(s) Oral daily  epoetin sara Injectable 52948 Unit(s) SubCutaneous <User Schedule>  gabapentin 100 milliGRAM(s) Oral three times a day  insulin glargine Injectable (LANTUS) 7 Unit(s) SubCutaneous at bedtime  insulin lispro (HumaLOG) corrective regimen sliding scale   SubCutaneous three times a day before meals  insulin lispro Injectable (HumaLOG) 3 Unit(s) SubCutaneous three times a day with meals  isosorbide   mononitrate ER Tablet (IMDUR) 60 milliGRAM(s) Oral daily  metolazone 2.5 milliGRAM(s) Oral daily  pantoprazole    Tablet 40 milliGRAM(s) Oral before breakfast  torsemide 40 milliGRAM(s) Oral daily    MEDICATIONS  (PRN):  acetaminophen   Tablet .. 650 milliGRAM(s) Oral every 6 hours PRN Temp greater or equal to 38C (100.4F), Mild Pain (1 - 3)  bisacodyl Suppository 10 milliGRAM(s) Rectal daily PRN Constipation  dextrose 40% Gel 15 Gram(s) Oral once PRN Blood Glucose LESS THAN 70 milliGRAM(s)/deciliter  glucagon  Injectable 1 milliGRAM(s) IntraMuscular once PRN Glucose LESS THAN 70 milligrams/deciliter  lactulose Syrup 10 Gram(s) Oral three times a day PRN colnstipaiton      Allergies    Accupril (Other)    Intolerances          Vital Signs Last 24 Hrs  T(C): 36.9 (20 Dec 2018 10:30), Max: 37.1 (20 Dec 2018 05:13)  T(F): 98.5 (20 Dec 2018 10:30), Max: 98.7 (20 Dec 2018 05:13)  HR: 79 (20 Dec 2018 11:40) (68 - 85)  BP: 141/65 (20 Dec 2018 10:30) (137/58 - 154/62)  BP(mean): --  RR: 18 (20 Dec 2018 10:30) (16 - 20)  SpO2: 99% (20 Dec 2018 11:40) (97% - 100%)  Daily     Daily   I&O's Detail    19 Dec 2018 07:01  -  20 Dec 2018 07:00  --------------------------------------------------------  IN:    Oral Fluid: 480 mL  Total IN: 480 mL    OUT:  Total OUT: 0 mL    Total NET: 480 mL      20 Dec 2018 07:01  -  20 Dec 2018 14:57  --------------------------------------------------------  IN:    Oral Fluid: 800 mL  Total IN: 800 mL    OUT:  Total OUT: 0 mL    Total NET: 800 mL        I&O's Summary    19 Dec 2018 07:01  -  20 Dec 2018 07:00  --------------------------------------------------------  IN: 480 mL / OUT: 0 mL / NET: 480 mL    20 Dec 2018 07:01  -  20 Dec 2018 14:57  --------------------------------------------------------  IN: 800 mL / OUT: 0 mL / NET: 800 mL        PHYSICAL EXAM:  GENERAL: NAD  HEENT: same  NECK: Supple, +JVD  NERVOUS SYSTEM:  Alert & Oriented in bed  CHEST/LUNG: Diminished BS right side, no wheezes  HEART: Regular rate and rhythm; no rub  ABDOMEN: Soft, Nontender,+ distention; +BS; + flank edema  EXTREMITIES:   leg edema better    LABS:                        8.2    6.7   )-----------( 210      ( 20 Dec 2018 06:32 )             24.9     12-20    142  |  90<L>  |  51.0<H>  ----------------------------<  179<H>  4.1   |  46.0<HH>  |  1.15    Ca    9.0      20 Dec 2018 06:32  Phos  2.2     12-20  Mg     1.9     12-20      PT/INR - ( 20 Dec 2018 13:35 )   PT: 14.1 sec;   INR: 1.22 ratio          EXAM:  XR CHEST PORTABLE URGENT 1V                          PROCEDURE DATE:  12/20/2018          INTERPRETATION:  Portable chest radiograph        CLINICAL INFORMATION:   Unresponsive . assess for congestive heart   failure.    TECHNIQUE:  Portable  AP view of the chest was obtained.    COMPARISON: 12/16/2018 available for review.    FINDINGS:   The lungs  show RIGHT lower lobe airspace consolidation and moderate   RIGHT effusion layering along posterior RIGHT thoracic wall. LEFT lung   clear.        The  heart is enlarged in transverse diameter. No hilar mass. Trachea   midline.       . Status post coronary artery bypass graft procedure. Visualized osseous   structures are intact.        IMPRESSION:   Cardiomegaly.  RIGHT lower lobe airspace consolidation and effusion..                  PROCEDURE DATE:  12/01/2018          INTERPRETATION:  CT ABDOMEN AND PELVIS IC    HISTORY:  ascites    Technique: CT of the abdomen and pelvis is performed without oral with   intravenous contrast. Axial images are supplemented with coronal and   sagittal reformations. This study was performed using automatic exposure   control (radiation dose reduction software) to obtain a diagnostic image   quality scan with patient dose as low as reasonably achievable.    Contrast: 95 cc Omnipaque 300    Comparison: CT abdomen and pelvis 11/25/2018    Findings:  LIVER: Heterogeneous enhancement with diffusely low attenuation may be on   the basis of steatosis and/or passive congestion from right-sided heart   failure.  SPLEEN: Normal.  PANCREAS: Normal.  GALLBLADDER/BILIARY TREE: Nondilated. Normal gallbladder.  ADRENALS: Normal.  KIDNEYS: Nonobstructing right renal stones.  LYMPHADENOPATHY/RETROPERITONEUM: No adenopathy.  VASCULATURE: Aortoiliac atherosclerosis without aneurysm. Left   femoral-distal bypass graft is patent in the visualized portions.   Overlying postoperative collection again noted.  BOWEL: No bowel-related abnormality. Specifically, no bowel obstruction.  PELVIC VISCERA: No uterine or adnexal abnormality.  PELVIC LYMPH NODES: No pelvic adenopathy.  PERITONEUM/ABDOMINAL WALL: Small ascites. No free air. Diffuse body wall   edema.  SKELETAL: No acute bony abnormality.  LUNG BASES: Please refer to separately reported chest CT.    IMPRESSION:     Heterogeneous enhancement of the liver with diffusely low attenuation may   be on the basis of steatosis and/or passive congestion from right-sided   heart failure.    Anasarca characterized by small volume ascites, diffusebody wall edema,   and bilateral pleural effusions.            Magnesium, Serum: 1.9 mg/dL (12-20 @ 06:32)  Phosphorus Level, Serum: 2.2 mg/dL (12-20 @ 06:32)    ABG - ( 20 Dec 2018 08:37 )  pH, Arterial: 7.47  pH, Blood: x     /  pCO2: 65    /  pO2: 64    / HCO3: 46    / Base Excess: 21.2  /  SaO2: 93                    RADIOLOGY & ADDITIONAL TESTS:

## 2018-12-20 NOTE — CONSULT NOTE ADULT - REASON FOR ADMISSION
Shortness of breath
Shortness of breath/Syncope
Shortness of breath
Shortness of breath
CHF
Shortness of breath

## 2018-12-20 NOTE — PROGRESS NOTE ADULT - SUBJECTIVE AND OBJECTIVE BOX
[place] [facility] Faculty Practice                                                        Office: [address] [address]                                                       Telephone: [phone]. Fax: [phone]      CC: Shortness of breath.     INTERVAL HISTORY: Had an RRT. Rhythm- Junctional. Then sinus.     MEDICATIONS  (STANDING):  aspirin  chewable 81 milliGRAM(s) Oral daily  atorvastatin 40 milliGRAM(s) Oral at bedtime  carvedilol 25 milliGRAM(s) Oral every 12 hours  clopidogrel Tablet 75 milliGRAM(s) Oral daily  dextrose 5%. 1000 milliLiter(s) (50 mL/Hr) IV Continuous <Continuous>  dextrose 50% Injectable 12.5 Gram(s) IV Push once  dextrose 50% Injectable 25 Gram(s) IV Push once  dextrose 50% Injectable 25 Gram(s) IV Push once  docusate sodium 100 milliGRAM(s) Oral two times a day  DULoxetine 60 milliGRAM(s) Oral daily  epoetin sara Injectable 74048 Unit(s) SubCutaneous <User Schedule>  gabapentin 100 milliGRAM(s) Oral three times a day  insulin glargine Injectable (LANTUS) 7 Unit(s) SubCutaneous at bedtime  insulin lispro (HumaLOG) corrective regimen sliding scale   SubCutaneous three times a day before meals  insulin lispro Injectable (HumaLOG) 3 Unit(s) SubCutaneous three times a day with meals  isosorbide   mononitrate ER Tablet (IMDUR) 60 milliGRAM(s) Oral daily  metolazone 2.5 milliGRAM(s) Oral daily  pantoprazole    Tablet 40 milliGRAM(s) Oral before breakfast  torsemide 40 milliGRAM(s) Oral two times a day    ROS: All others negative     PHYSICAL EXAM:  T(C): 36.9 (12-20-18 @ 10:30), Max: 37.1 (12-20-18 @ 05:13)  HR: 79 (12-20-18 @ 11:40) (68 - 85)  BP: 141/65 (12-20-18 @ 10:30) (137/58 - 154/62)  RR: 18 (12-20-18 @ 10:30) (16 - 20)  SpO2: 99% (12-20-18 @ 11:40) (97% - 100%)  Wt(kg): --  I&O's Summary    19 Dec 2018 07:01  -  20 Dec 2018 07:00  --------------------------------------------------------  IN: 480 mL / OUT: 0 mL / NET: 480 mL    20 Dec 2018 07:01  -  20 Dec 2018 12:18  --------------------------------------------------------  IN: 400 mL / OUT: 0 mL / NET: 400 mL      Appearance: Normal	  HEENT:   Normal oral mucosa, PERRL, EOMI	  Lymphatic: No lymphadenopathy  Cardiovascular: Normal S1 S2, No JVD, No murmurs, No edema  Respiratory: decreased breath sounds bilaterally.   Psychiatry: A & O x 3, Mood & affect appropriate  Gastrointestinal:  Soft, Non-tender, + BS	  Skin: No rashes, No ecchymoses, No cyanosis  Neurologic: Non-focal  Extremities: Normal range of motion, No clubbing, cyanosis or edema  Vascular: Peripheral pulses palpable 2+ bilaterally    TELEMETRY: 	      LABS:	 	                        8.2    6.7   )-----------( 210      ( 20 Dec 2018 06:32 )             24.9     12-20    142  |  90<L>  |  51.0<H>  ----------------------------<  179<H>  4.1   |  46.0<HH>  |  1.15    Ca    9.0      20 Dec 2018 06:32  Phos  2.2     12-20  Mg     1.9     12-20

## 2018-12-20 NOTE — CONSULT NOTE ADULT - PROBLEM SELECTOR RECOMMENDATION 9
Now on Bipap.  Pulmonary following- rec Trilogy
-Lasix 40 mg Q12  -continue Micardis  -daily weights  -strict I&Os  -echocardiogram
Recurrent Right sided pleural effusion s/p thoracentesis by IR 12/12 12/20 CXR  with Right pleural effusion   Pt aox3, on Bipap, oxygen saturation 100%  will do right sided pigtail catheter insertion for symptomatic relief  monitor oxygen saturation  daily CXR   will send fluid cytology, culture  Continue care per primary team  Will continue to follow  Plan discussed with Dr. Escudero
Unlikely cardiac arrest after telemetry review and more likely syncope, related to the severe hypercapnia. EKG is not ischemic and their Is no chest pain.   F/u EKG and cardiology

## 2018-12-20 NOTE — CHART NOTE - NSCHARTNOTEFT_GEN_A_CORE
Pt with right sided pleural effusion, s/p right PTC placement with 1000cc serosanguinous drainage. Post procedure CXR reviewed, no pneumothorax noted with improvement of effusion.

## 2018-12-20 NOTE — PROCEDURE NOTE - NSSITEPREP_SKIN_A_CORE
povidone-iodine ( under 2 weeks of age or 1500 grams)/chlorhexidine/Adherence to aseptic technique: hand hygiene prior to donning barriers (gown, gloves), don cap and mask, sterile drape over patient
alcohol/Adherence to aseptic technique: hand hygiene prior to donning barriers (gown, gloves), don cap and mask, sterile drape over patient

## 2018-12-20 NOTE — CONSULT NOTE ADULT - PROBLEM SELECTOR RECOMMENDATION 3
Plan for Trilogy
-Lasix IV  -Kayexalate 15 mg  -repeat BMP
As above  Check Head CT and EEG, although doubt this is non-convulsive Seizure activity  Neuro evaluation

## 2018-12-20 NOTE — RESPIRATORY CARE EMERGENCY NOTE - CAC CALL TYPE
CAC
rapid response
Normal rate, regular rhythm.  Heart sounds S1, S2.  No murmurs, rubs or gallops.

## 2018-12-21 ENCOUNTER — RESULT REVIEW (OUTPATIENT)
Age: 63
End: 2018-12-21

## 2018-12-21 LAB
ALBUMIN SERPL ELPH-MCNC: 2.6 G/DL — LOW (ref 3.3–5.2)
ANION GAP SERPL CALC-SCNC: 8 MMOL/L — SIGNIFICANT CHANGE UP (ref 5–17)
ANISOCYTOSIS BLD QL: SIGNIFICANT CHANGE UP
BASOPHILS # BLD AUTO: 0 K/UL — SIGNIFICANT CHANGE UP (ref 0–0.2)
BASOPHILS NFR BLD AUTO: 0.3 % — SIGNIFICANT CHANGE UP (ref 0–2)
BUN SERPL-MCNC: 44 MG/DL — HIGH (ref 8–20)
CALCIUM SERPL-MCNC: 8.5 MG/DL — LOW (ref 8.6–10.2)
CHLORIDE SERPL-SCNC: 94 MMOL/L — LOW (ref 98–107)
CO2 SERPL-SCNC: 40 MMOL/L — HIGH (ref 22–29)
CREAT SERPL-MCNC: 0.95 MG/DL — SIGNIFICANT CHANGE UP (ref 0.5–1.3)
ELLIPTOCYTES BLD QL SMEAR: SLIGHT — SIGNIFICANT CHANGE UP
EOSINOPHIL # BLD AUTO: 0.3 K/UL — SIGNIFICANT CHANGE UP (ref 0–0.5)
EOSINOPHIL NFR BLD AUTO: 4.2 % — SIGNIFICANT CHANGE UP (ref 0–6)
GLUCOSE BLDC GLUCOMTR-MCNC: 158 MG/DL — HIGH (ref 70–99)
GLUCOSE BLDC GLUCOMTR-MCNC: 219 MG/DL — HIGH (ref 70–99)
GLUCOSE BLDC GLUCOMTR-MCNC: 224 MG/DL — HIGH (ref 70–99)
GLUCOSE BLDC GLUCOMTR-MCNC: 247 MG/DL — HIGH (ref 70–99)
GLUCOSE BLDC GLUCOMTR-MCNC: 255 MG/DL — HIGH (ref 70–99)
GLUCOSE SERPL-MCNC: 140 MG/DL — HIGH (ref 70–115)
HCT VFR BLD CALC: 25.2 % — LOW (ref 37–47)
HGB BLD-MCNC: 8.4 G/DL — LOW (ref 12–16)
HYPOCHROMIA BLD QL: SLIGHT — SIGNIFICANT CHANGE UP
LDH SERPL L TO P-CCNC: 139 U/L — SIGNIFICANT CHANGE UP
LYMPHOCYTES # BLD AUTO: 1.3 K/UL — SIGNIFICANT CHANGE UP (ref 1–4.8)
LYMPHOCYTES # BLD AUTO: 16.1 % — LOW (ref 20–55)
MACROCYTES BLD QL: SLIGHT — SIGNIFICANT CHANGE UP
MAGNESIUM SERPL-MCNC: 1.9 MG/DL — SIGNIFICANT CHANGE UP (ref 1.6–2.6)
MCHC RBC-ENTMCNC: 27.4 PG — SIGNIFICANT CHANGE UP (ref 27–31)
MCHC RBC-ENTMCNC: 33.3 G/DL — SIGNIFICANT CHANGE UP (ref 32–36)
MCV RBC AUTO: 82.1 FL — SIGNIFICANT CHANGE UP (ref 81–99)
MICROCYTES BLD QL: SLIGHT — SIGNIFICANT CHANGE UP
MONOCYTES # BLD AUTO: 0.5 K/UL — SIGNIFICANT CHANGE UP (ref 0–0.8)
MONOCYTES NFR BLD AUTO: 6.2 % — SIGNIFICANT CHANGE UP (ref 3–10)
NEUTROPHILS # BLD AUTO: 5.8 K/UL — SIGNIFICANT CHANGE UP (ref 1.8–8)
NEUTROPHILS NFR BLD AUTO: 73.2 % — HIGH (ref 37–73)
NT-PROBNP SERPL-SCNC: 4676 PG/ML — HIGH (ref 0–300)
PHOSPHATE SERPL-MCNC: 2.4 MG/DL — SIGNIFICANT CHANGE UP (ref 2.4–4.7)
PLAT MORPH BLD: NORMAL — SIGNIFICANT CHANGE UP
PLATELET # BLD AUTO: 218 K/UL — SIGNIFICANT CHANGE UP (ref 150–400)
POIKILOCYTOSIS BLD QL AUTO: SLIGHT — SIGNIFICANT CHANGE UP
POLYCHROMASIA BLD QL SMEAR: SLIGHT — SIGNIFICANT CHANGE UP
POTASSIUM SERPL-MCNC: 4.3 MMOL/L — SIGNIFICANT CHANGE UP (ref 3.5–5.3)
POTASSIUM SERPL-SCNC: 4.3 MMOL/L — SIGNIFICANT CHANGE UP (ref 3.5–5.3)
PROT FLD-MCNC: 3.4 G/DL — SIGNIFICANT CHANGE UP
RBC # BLD: 3.07 M/UL — LOW (ref 4.4–5.2)
RBC # FLD: 20.6 % — HIGH (ref 11–15.6)
RBC BLD AUTO: ABNORMAL
SODIUM SERPL-SCNC: 142 MMOL/L — SIGNIFICANT CHANGE UP (ref 135–145)
TARGETS BLD QL SMEAR: SIGNIFICANT CHANGE UP
WBC # BLD: 7.9 K/UL — SIGNIFICANT CHANGE UP (ref 4.8–10.8)
WBC # FLD AUTO: 7.9 K/UL — SIGNIFICANT CHANGE UP (ref 4.8–10.8)

## 2018-12-21 PROCEDURE — 99232 SBSQ HOSP IP/OBS MODERATE 35: CPT

## 2018-12-21 PROCEDURE — 88342 IMHCHEM/IMCYTCHM 1ST ANTB: CPT | Mod: 26

## 2018-12-21 PROCEDURE — 88112 CYTOPATH CELL ENHANCE TECH: CPT | Mod: 26

## 2018-12-21 PROCEDURE — 71045 X-RAY EXAM CHEST 1 VIEW: CPT | Mod: 26,76

## 2018-12-21 PROCEDURE — 88305 TISSUE EXAM BY PATHOLOGIST: CPT | Mod: 26

## 2018-12-21 PROCEDURE — 88341 IMHCHEM/IMCYTCHM EA ADD ANTB: CPT | Mod: 26

## 2018-12-21 RX ORDER — ENOXAPARIN SODIUM 100 MG/ML
40 INJECTION SUBCUTANEOUS DAILY
Qty: 0 | Refills: 0 | Status: DISCONTINUED | OUTPATIENT
Start: 2018-12-21 | End: 2018-12-26

## 2018-12-21 RX ADMIN — ENOXAPARIN SODIUM 40 MILLIGRAM(S): 100 INJECTION SUBCUTANEOUS at 22:52

## 2018-12-21 RX ADMIN — ISOSORBIDE MONONITRATE 60 MILLIGRAM(S): 60 TABLET, EXTENDED RELEASE ORAL at 14:46

## 2018-12-21 RX ADMIN — Medication 3 UNIT(S): at 08:37

## 2018-12-21 RX ADMIN — Medication 1: at 08:37

## 2018-12-21 RX ADMIN — PANTOPRAZOLE SODIUM 40 MILLIGRAM(S): 20 TABLET, DELAYED RELEASE ORAL at 05:34

## 2018-12-21 RX ADMIN — GABAPENTIN 100 MILLIGRAM(S): 400 CAPSULE ORAL at 22:52

## 2018-12-21 RX ADMIN — Medication 100 MILLIGRAM(S): at 05:34

## 2018-12-21 RX ADMIN — Medication 2: at 12:42

## 2018-12-21 RX ADMIN — CARVEDILOL PHOSPHATE 25 MILLIGRAM(S): 80 CAPSULE, EXTENDED RELEASE ORAL at 05:34

## 2018-12-21 RX ADMIN — INSULIN GLARGINE 7 UNIT(S): 100 INJECTION, SOLUTION SUBCUTANEOUS at 22:52

## 2018-12-21 RX ADMIN — Medication 81 MILLIGRAM(S): at 12:42

## 2018-12-21 RX ADMIN — Medication 2: at 17:14

## 2018-12-21 RX ADMIN — CARVEDILOL PHOSPHATE 25 MILLIGRAM(S): 80 CAPSULE, EXTENDED RELEASE ORAL at 17:13

## 2018-12-21 RX ADMIN — Medication 3 UNIT(S): at 17:13

## 2018-12-21 RX ADMIN — ATORVASTATIN CALCIUM 40 MILLIGRAM(S): 80 TABLET, FILM COATED ORAL at 22:52

## 2018-12-21 RX ADMIN — GABAPENTIN 100 MILLIGRAM(S): 400 CAPSULE ORAL at 14:46

## 2018-12-21 RX ADMIN — CLOPIDOGREL BISULFATE 75 MILLIGRAM(S): 75 TABLET, FILM COATED ORAL at 12:42

## 2018-12-21 RX ADMIN — DULOXETINE HYDROCHLORIDE 60 MILLIGRAM(S): 30 CAPSULE, DELAYED RELEASE ORAL at 14:46

## 2018-12-21 RX ADMIN — Medication 3 UNIT(S): at 12:42

## 2018-12-21 RX ADMIN — GABAPENTIN 100 MILLIGRAM(S): 400 CAPSULE ORAL at 05:34

## 2018-12-21 RX ADMIN — Medication 40 MILLIGRAM(S): at 05:34

## 2018-12-21 NOTE — PROGRESS NOTE ADULT - SUBJECTIVE AND OBJECTIVE BOX
CHIEF COMPLAINT/INTERVAL HISTORY:    Patient is a 63y old  Female who presents with a chief complaint of Shortness of breath (20 Dec 2018 14:57)      HPI:  62 y/o female with PMHx of CAD s/p CABG x1 Stent placement x2,  PAD s/p balloon angioplasty, DM2, HTN, HLD came to the ED complaining of worsening dyspnea with minimal exertion and increasing abdominal girth over the past 2 weeks. She usually ambulate with walker since R foot heel & 3 toes amputation last December without difficulty but lately has been having difficulty ambulating due to shortness of breath. She also reported generalized pain in her abdomen radiating to the left flank due to swelling in her abdomen. She has no chest pain, palpitation, orthopnea, cough, fever, chills, change in bowel/urinary habit, nausea, vomiting, recent travel, calf pain. Patient endorsed compliance to medication and diet. (25 Nov 2018 22:56)      SUBJECTIVE & OBJECTIVE/ ROS: Pt seen and examined at bedside. Pt doing well today. On 5L NC. No veents on tele. She was on Bipap overnight,. No chest pain, palpitations, sob, light headedness/dizziness, difficulty breathing/cough, fevers/chills, abdominal pain, n/v, diarrhea/constipation, dysuria or increased urinary frequency.      ICU Vital Signs Last 24 Hrs  T(C): 36.4 (20 Dec 2018 15:09), Max: 37.1 (20 Dec 2018 05:13)  T(F): 97.6 (20 Dec 2018 15:09), Max: 98.7 (20 Dec 2018 05:13)  HR: 69 (20 Dec 2018 15:09) (68 - 85)  BP: 92/53 (20 Dec 2018 15:09) (92/53 - 154/62)  BP(mean): --  ABP: --  ABP(mean): --  RR: 18 (20 Dec 2018 15:09) (16 - 20)  SpO2: 100% (20 Dec 2018 15:09) (97% - 100%)        MEDICATIONS  (STANDING):  aspirin  chewable 81 milliGRAM(s) Oral daily  atorvastatin 40 milliGRAM(s) Oral at bedtime  carvedilol 25 milliGRAM(s) Oral every 12 hours  clopidogrel Tablet 75 milliGRAM(s) Oral daily  dextrose 5%. 1000 milliLiter(s) (50 mL/Hr) IV Continuous <Continuous>  dextrose 50% Injectable 12.5 Gram(s) IV Push once  dextrose 50% Injectable 25 Gram(s) IV Push once  dextrose 50% Injectable 25 Gram(s) IV Push once  docusate sodium 100 milliGRAM(s) Oral two times a day  DULoxetine 60 milliGRAM(s) Oral daily  epoetin sara Injectable 56600 Unit(s) SubCutaneous <User Schedule>  gabapentin 100 milliGRAM(s) Oral three times a day  insulin glargine Injectable (LANTUS) 7 Unit(s) SubCutaneous at bedtime  insulin lispro (HumaLOG) corrective regimen sliding scale   SubCutaneous three times a day before meals  insulin lispro Injectable (HumaLOG) 3 Unit(s) SubCutaneous three times a day with meals  isosorbide   mononitrate ER Tablet (IMDUR) 60 milliGRAM(s) Oral daily  pantoprazole    Tablet 40 milliGRAM(s) Oral before breakfast  torsemide 40 milliGRAM(s) Oral daily    MEDICATIONS  (PRN):  acetaminophen   Tablet .. 650 milliGRAM(s) Oral every 6 hours PRN Temp greater or equal to 38C (100.4F), Mild Pain (1 - 3)  bisacodyl Suppository 10 milliGRAM(s) Rectal daily PRN Constipation  dextrose 40% Gel 15 Gram(s) Oral once PRN Blood Glucose LESS THAN 70 milliGRAM(s)/deciliter  glucagon  Injectable 1 milliGRAM(s) IntraMuscular once PRN Glucose LESS THAN 70 milligrams/deciliter  lactulose Syrup 10 Gram(s) Oral three times a day PRN colnstipaiton      LABS:                        8.2    6.7   )-----------( 210      ( 20 Dec 2018 06:32 )             24.9     12-20    142  |  90<L>  |  51.0<H>  ----------------------------<  179<H>  4.1   |  46.0<HH>  |  1.15    Ca    9.0      20 Dec 2018 06:32  Phos  2.2     12-20  Mg     1.9     12-20      PT/INR - ( 20 Dec 2018 13:35 )   PT: 14.1 sec;   INR: 1.22 ratio               CAPILLARY BLOOD GLUCOSE      POCT Blood Glucose.: 158 mg/dL (20 Dec 2018 12:12)  POCT Blood Glucose.: 206 mg/dL (20 Dec 2018 08:18)  POCT Blood Glucose.: 274 mg/dL (19 Dec 2018 20:49)  POCT Blood Glucose.: 217 mg/dL (19 Dec 2018 17:01)      RECENT CULTURES:      RADIOLOGY & ADDITIONAL TESTS:      PHYSICAL EXAM:    GENERAL: not in acute distress, morbidly obese   EYES: Pupils equal, reactive to light.  Symmetric.  EARS, NOSE, THROAT: Normal; supple neck, no lymphadenopathy; trachea midline  Lungs: Clear to auscultation bilaterally  CVS: Regular rate and rhythm, s1, s2  GI: Soft, nondistended, nontender, normoactive bowel sounds, no masses, no guarding  EXTREMITIES: No edema  SKIN: Warm and well perfused, no rashes noted.  NEURO: AAO x 3, moving all extremities CHIEF COMPLAINT/INTERVAL HISTORY:    Patient is a 63y old  Female who presents with a chief complaint of Shortness of breath (20 Dec 2018 14:57)      HPI:  64 y/o female with PMHx of CAD s/p CABG x1 Stent placement x2,  PAD s/p balloon angioplasty, DM2, HTN, HLD came to the ED complaining of worsening dyspnea with minimal exertion and increasing abdominal girth over the past 2 weeks. She usually ambulate with walker since R foot heel & 3 toes amputation last December without difficulty but lately has been having difficulty ambulating due to shortness of breath. She also reported generalized pain in her abdomen radiating to the left flank due to swelling in her abdomen. She has no chest pain, palpitation, orthopnea, cough, fever, chills, change in bowel/urinary habit, nausea, vomiting, recent travel, calf pain. Patient endorsed compliance to medication and diet. (25 Nov 2018 22:56)      SUBJECTIVE & OBJECTIVE/ ROS: Pt seen and examined at bedside. Pt doing well today. On 5L NC. No events on tele. She was on Bipap overnight,. No chest pain, palpitations, sob, light headedness/dizziness, difficulty breathing/cough, fevers/chills, abdominal pain, n/v, diarrhea/constipation, dysuria or increased urinary frequency.      ICU Vital Signs Last 24 Hrs  T(C): 36.4 (20 Dec 2018 15:09), Max: 37.1 (20 Dec 2018 05:13)  T(F): 97.6 (20 Dec 2018 15:09), Max: 98.7 (20 Dec 2018 05:13)  HR: 69 (20 Dec 2018 15:09) (68 - 85)  BP: 92/53 (20 Dec 2018 15:09) (92/53 - 154/62)  BP(mean): --  ABP: --  ABP(mean): --  RR: 18 (20 Dec 2018 15:09) (16 - 20)  SpO2: 100% (20 Dec 2018 15:09) (97% - 100%)        MEDICATIONS  (STANDING):  aspirin  chewable 81 milliGRAM(s) Oral daily  atorvastatin 40 milliGRAM(s) Oral at bedtime  carvedilol 25 milliGRAM(s) Oral every 12 hours  clopidogrel Tablet 75 milliGRAM(s) Oral daily  dextrose 5%. 1000 milliLiter(s) (50 mL/Hr) IV Continuous <Continuous>  dextrose 50% Injectable 12.5 Gram(s) IV Push once  dextrose 50% Injectable 25 Gram(s) IV Push once  dextrose 50% Injectable 25 Gram(s) IV Push once  docusate sodium 100 milliGRAM(s) Oral two times a day  DULoxetine 60 milliGRAM(s) Oral daily  epoetin sara Injectable 55616 Unit(s) SubCutaneous <User Schedule>  gabapentin 100 milliGRAM(s) Oral three times a day  insulin glargine Injectable (LANTUS) 7 Unit(s) SubCutaneous at bedtime  insulin lispro (HumaLOG) corrective regimen sliding scale   SubCutaneous three times a day before meals  insulin lispro Injectable (HumaLOG) 3 Unit(s) SubCutaneous three times a day with meals  isosorbide   mononitrate ER Tablet (IMDUR) 60 milliGRAM(s) Oral daily  pantoprazole    Tablet 40 milliGRAM(s) Oral before breakfast  torsemide 40 milliGRAM(s) Oral daily    MEDICATIONS  (PRN):  acetaminophen   Tablet .. 650 milliGRAM(s) Oral every 6 hours PRN Temp greater or equal to 38C (100.4F), Mild Pain (1 - 3)  bisacodyl Suppository 10 milliGRAM(s) Rectal daily PRN Constipation  dextrose 40% Gel 15 Gram(s) Oral once PRN Blood Glucose LESS THAN 70 milliGRAM(s)/deciliter  glucagon  Injectable 1 milliGRAM(s) IntraMuscular once PRN Glucose LESS THAN 70 milligrams/deciliter  lactulose Syrup 10 Gram(s) Oral three times a day PRN colnstipaiton      LABS:                        8.2    6.7   )-----------( 210      ( 20 Dec 2018 06:32 )             24.9     12-20    142  |  90<L>  |  51.0<H>  ----------------------------<  179<H>  4.1   |  46.0<HH>  |  1.15    Ca    9.0      20 Dec 2018 06:32  Phos  2.2     12-20  Mg     1.9     12-20      PT/INR - ( 20 Dec 2018 13:35 )   PT: 14.1 sec;   INR: 1.22 ratio               CAPILLARY BLOOD GLUCOSE      POCT Blood Glucose.: 158 mg/dL (20 Dec 2018 12:12)  POCT Blood Glucose.: 206 mg/dL (20 Dec 2018 08:18)  POCT Blood Glucose.: 274 mg/dL (19 Dec 2018 20:49)  POCT Blood Glucose.: 217 mg/dL (19 Dec 2018 17:01)      RECENT CULTURES:      RADIOLOGY & ADDITIONAL TESTS:      PHYSICAL EXAM:    GENERAL: not in acute distress, morbidly obese   EYES: Pupils equal, reactive to light.  Symmetric.  EARS, NOSE, THROAT: Normal; supple neck, no lymphadenopathy; trachea midline  Lungs: Clear to auscultation bilaterally  CVS: Regular rate and rhythm, s1, s2  GI: Soft, nondistended, nontender, normoactive bowel sounds, no masses, no guarding  EXTREMITIES: No edema  SKIN: Warm and well perfused, no rashes noted.  NEURO: AAO x 3, moving all extremities

## 2018-12-21 NOTE — PROGRESS NOTE ADULT - ATTENDING COMMENTS
Discussed with daughter Cas [phone] about patient guarded prognosis. She states she wants her mom to be Full code. Tried calling pt's HCP  Pito [phone], unable to reach him. Palliative consult called

## 2018-12-21 NOTE — PROGRESS NOTE ADULT - SUBJECTIVE AND OBJECTIVE BOX
NEPHROLOGY INTERVAL HPI/OVERNIGHT EVENTS:    No new events.    MEDICATIONS  (STANDING):  aspirin  chewable 81 milliGRAM(s) Oral daily  atorvastatin 40 milliGRAM(s) Oral at bedtime  carvedilol 25 milliGRAM(s) Oral every 12 hours  clopidogrel Tablet 75 milliGRAM(s) Oral daily  dextrose 5%. 1000 milliLiter(s) (50 mL/Hr) IV Continuous <Continuous>  dextrose 50% Injectable 12.5 Gram(s) IV Push once  dextrose 50% Injectable 25 Gram(s) IV Push once  dextrose 50% Injectable 25 Gram(s) IV Push once  docusate sodium 100 milliGRAM(s) Oral two times a day  DULoxetine 60 milliGRAM(s) Oral daily  epoetin sara Injectable 93865 Unit(s) SubCutaneous <User Schedule>  gabapentin 100 milliGRAM(s) Oral three times a day  insulin glargine Injectable (LANTUS) 7 Unit(s) SubCutaneous at bedtime  insulin lispro (HumaLOG) corrective regimen sliding scale   SubCutaneous three times a day before meals  insulin lispro Injectable (HumaLOG) 3 Unit(s) SubCutaneous three times a day with meals  isosorbide   mononitrate ER Tablet (IMDUR) 60 milliGRAM(s) Oral daily  metolazone 2.5 milliGRAM(s) Oral daily  pantoprazole    Tablet 40 milliGRAM(s) Oral before breakfast  torsemide 40 milliGRAM(s) Oral daily    MEDICATIONS  (PRN):  acetaminophen   Tablet .. 650 milliGRAM(s) Oral every 6 hours PRN Temp greater or equal to 38C (100.4F), Mild Pain (1 - 3)  bisacodyl Suppository 10 milliGRAM(s) Rectal daily PRN Constipation  dextrose 40% Gel 15 Gram(s) Oral once PRN Blood Glucose LESS THAN 70 milliGRAM(s)/deciliter  glucagon  Injectable 1 milliGRAM(s) IntraMuscular once PRN Glucose LESS THAN 70 milligrams/deciliter  lactulose Syrup 10 Gram(s) Oral three times a day PRN colnstipaiton      Allergies    Accupril (Other)          Vital Signs Last 24 Hrs    T(C): 36.6 (21 Dec 2018 05:26), Max: 36.9 (20 Dec 2018 10:30)  T(F): 97.8 (21 Dec 2018 05:26), Max: 98.5 (20 Dec 2018 10:30)  HR: 76 (21 Dec 2018 05:26) (69 - 80)  BP: 132/68 (21 Dec 2018 05:26) (92/53 - 141/65)  BP(mean): --  RR: 18 (21 Dec 2018 05:26) (16 - 18)  SpO2: 100% (21 Dec 2018 05:26) (98% - 100%)  T(C): 36.9 (20 Dec 2018 10:30), Max: 37.1 (20 Dec 2018 05:13)  T(F): 98.5 (20 Dec 2018 10:30), Max: 98.7 (20 Dec 2018 05:13)  HR: 79 (20 Dec 2018 11:40) (68 - 85)  BP: 141/65 (20 Dec 2018 10:30) (137/58 - 154/62)  BP(mean): --  RR: 18 (20 Dec 2018 10:30) (16 - 20)          PHYSICAL EXAM:    GENERAL: Appears  chronically ill, in bed.  HEENT: same  NECK: Supple, +JVD  NERVOUS SYSTEM:  Alert & Oriented in bed  CHEST/LUNG: Right chest tube with pink drainage  HEART: Regular rate and rhythm; no rub  ABDOMEN: Soft, Nontender,+ distention; +BS; + flank edema  EXTREMITIES:   legs same    LABS:                        8.2    6.7   )-----------( 210      ( 20 Dec 2018 06:32 )             24.9     12-20    142  |  90<L>  |  51.0<H>  ----------------------------<  179<H>  4.1   |  46.0<HH>  |  1.15    Ca    9.0      20 Dec 2018 06:32  Phos  2.2     12-20  Mg     1.9     12-20      PT/INR - ( 20 Dec 2018 13:35 )   PT: 14.1 sec;   INR: 1.22 ratio          EXAM:  XR CHEST PORTABLE URGENT 1V                          PROCEDURE DATE:  12/20/2018          INTERPRETATION:  Portable chest radiograph        CLINICAL INFORMATION:   Unresponsive . assess for congestive heart   failure.    TECHNIQUE:  Portable  AP view of the chest was obtained.    COMPARISON: 12/16/2018 available for review.    FINDINGS:   The lungs  show RIGHT lower lobe airspace consolidation and moderate   RIGHT effusion layering along posterior RIGHT thoracic wall. LEFT lung   clear.        The  heart is enlarged in transverse diameter. No hilar mass. Trachea   midline.       . Status post coronary artery bypass graft procedure. Visualized osseous   structures are intact.        IMPRESSION:   Cardiomegaly.  RIGHT lower lobe airspace consolidation and effusion..                  PROCEDURE DATE:  12/01/2018          INTERPRETATION:  CT ABDOMEN AND PELVIS IC    HISTORY:  ascites    Technique: CT of the abdomen and pelvis is performed without oral with   intravenous contrast. Axial images are supplemented with coronal and   sagittal reformations. This study was performed using automatic exposure   control (radiation dose reduction software) to obtain a diagnostic image   quality scan with patient dose as low as reasonably achievable.    Contrast: 95 cc Omnipaque 300    Comparison: CT abdomen and pelvis 11/25/2018    Findings:  LIVER: Heterogeneous enhancement with diffusely low attenuation may be on   the basis of steatosis and/or passive congestion from right-sided heart   failure.  SPLEEN: Normal.  PANCREAS: Normal.  GALLBLADDER/BILIARY TREE: Nondilated. Normal gallbladder.  ADRENALS: Normal.  KIDNEYS: Nonobstructing right renal stones.  LYMPHADENOPATHY/RETROPERITONEUM: No adenopathy.  VASCULATURE: Aortoiliac atherosclerosis without aneurysm. Left   femoral-distal bypass graft is patent in the visualized portions.   Overlying postoperative collection again noted.  BOWEL: No bowel-related abnormality. Specifically, no bowel obstruction.  PELVIC VISCERA: No uterine or adnexal abnormality.  PELVIC LYMPH NODES: No pelvic adenopathy.  PERITONEUM/ABDOMINAL WALL: Small ascites. No free air. Diffuse body wall   edema.  SKELETAL: No acute bony abnormality.  LUNG BASES: Please refer to separately reported chest CT.    IMPRESSION:     Heterogeneous enhancement of the liver with diffusely low attenuation may   be on the basis of steatosis and/or passive congestion from right-sided   heart failure.    Anasarca characterized by small volume ascites, diffusebody wall edema,   and bilateral pleural effusions.            Magnesium, Serum: 1.9 mg/dL (12-20 @ 06:32)  Phosphorus Level, Serum: 2.2 mg/dL (12-20 @ 06:32)    ABG - ( 20 Dec 2018 08:37 )  pH, Arterial: 7.47  pH, Blood: x     /  pCO2: 65    /  pO2: 64    / HCO3: 46    / Base Excess: 21.2  /  SaO2: 93                    RADIOLOGY & ADDITIONAL TESTS:

## 2018-12-21 NOTE — CHART NOTE - NSCHARTNOTEFT_GEN_A_CORE
Patient was seen at the bedside. Right pigtail cathter to water seal remains in place. No air leak appreciated. Currently, still draining sero-sanguinous fluid. Will repeat CXR in AM.

## 2018-12-21 NOTE — PROGRESS NOTE ADULT - ASSESSMENT
63 yr woman,  with CAD s/p CABG and PCI, PAD s/p balloon angioplasty, KYREE on BiPap admitted with hypercapnic respiratory failure related to  CHF, KYREE/OHS.

## 2018-12-21 NOTE — PROGRESS NOTE ADULT - PROBLEM SELECTOR PLAN 1
Based on exam, BNP and other labs, patient most likely is intravascularly euvolemic at this time.   Would continue current therapy with torsemide.   Her RV function is poor secondary to

## 2018-12-21 NOTE — PROGRESS NOTE ADULT - SUBJECTIVE AND OBJECTIVE BOX
CC:  follow up GOC  INTERVAL HPI/OVERNIGHT EVENTS:  off Bipap   Plan for Trilogy    PRESENT SYMPTOMS: SOURCE:  Patient/Family/Team    PAIN SCALE:  0 = none  1 = mild   2 = moderate  3 = severe    Pain: appears comfortable    Dyspnea:  [ ] YES [x ] NO  Anxiety:  [ ] YES [ x] NO  Fatigue: [ x] YES [ ] NO  Nausea: [ ] YES [ x] NO  Loss of Appetite: [ x] YES [ ] NO  Other symptoms: __________    MEDICATIONS  (STANDING):  aspirin  chewable 81 milliGRAM(s) Oral daily  atorvastatin 40 milliGRAM(s) Oral at bedtime  carvedilol 25 milliGRAM(s) Oral every 12 hours  clopidogrel Tablet 75 milliGRAM(s) Oral daily  dextrose 5%. 1000 milliLiter(s) (50 mL/Hr) IV Continuous <Continuous>  dextrose 50% Injectable 12.5 Gram(s) IV Push once  dextrose 50% Injectable 25 Gram(s) IV Push once  dextrose 50% Injectable 25 Gram(s) IV Push once  docusate sodium 100 milliGRAM(s) Oral two times a day  DULoxetine 60 milliGRAM(s) Oral daily  enoxaparin Injectable 40 milliGRAM(s) SubCutaneous daily  epoetin sara Injectable 55105 Unit(s) SubCutaneous <User Schedule>  gabapentin 100 milliGRAM(s) Oral three times a day  insulin glargine Injectable (LANTUS) 7 Unit(s) SubCutaneous at bedtime  insulin lispro (HumaLOG) corrective regimen sliding scale   SubCutaneous three times a day before meals  insulin lispro Injectable (HumaLOG) 3 Unit(s) SubCutaneous three times a day with meals  isosorbide   mononitrate ER Tablet (IMDUR) 60 milliGRAM(s) Oral daily  pantoprazole    Tablet 40 milliGRAM(s) Oral before breakfast  torsemide 40 milliGRAM(s) Oral daily    MEDICATIONS  (PRN):  acetaminophen   Tablet .. 650 milliGRAM(s) Oral every 6 hours PRN Temp greater or equal to 38C (100.4F), Mild Pain (1 - 3)  bisacodyl Suppository 10 milliGRAM(s) Rectal daily PRN Constipation  dextrose 40% Gel 15 Gram(s) Oral once PRN Blood Glucose LESS THAN 70 milliGRAM(s)/deciliter  glucagon  Injectable 1 milliGRAM(s) IntraMuscular once PRN Glucose LESS THAN 70 milligrams/deciliter  lactulose Syrup 10 Gram(s) Oral three times a day PRN colnstipaiton      Allergies    Accupril (Other)    Intolerances      Karnofsky Performance Score/Palliative Performance Status Version 2:   30   %    Vital Signs Last 24 Hrs  T(C): 37.3 (21 Dec 2018 15:20), Max: 37.3 (21 Dec 2018 15:20)  T(F): 99.2 (21 Dec 2018 15:20), Max: 99.2 (21 Dec 2018 15:20)  HR: 72 (21 Dec 2018 15:20) (71 - 80)  BP: 119/58 (21 Dec 2018 15:20) (102/53 - 132/76)  BP(mean): --  RR: 18 (21 Dec 2018 15:20) (16 - 18)  SpO2: 100% (21 Dec 2018 15:20) (98% - 100%)    PHYSICAL EXAM:    General:  Obese, NAD  HEENT: [ x] normal  [ ] dry mouth  [ ] ET tube/trach    Lungs: [ x] comfortable  - nasal canula  CV: [x ] normal  [ ] tachycardia    GI: [ x] normal  [ ] distended  [ ] tender  [ ] no BS               [ ] PEG/NG/OG tube    : x[ ] normal  [ ] incontinent  [ ] oliguria/anuria  [ ] fontenot    MSK: [ ] normal  [ x] weakness  [ ] edema             [ ] ambulatory  [ ] bedbound/wheelchair bound    Skin: [ ] normal  [ ] pressure ulcers- Stage_____  [x ] no rash    LABS:                        8.4    7.9   )-----------( 218      ( 21 Dec 2018 05:56 )             25.2     12-21    142  |  94<L>  |  44.0<H>  ----------------------------<  140<H>  4.3   |  40.0<H>  |  0.95    Ca    8.5<L>      21 Dec 2018 05:56  Phos  2.4     12-21  Mg     1.9     12-21    TPro  x   /  Alb  2.6<L>  /  TBili  x   /  DBili  x   /  AST  x   /  ALT  x   /  AlkPhos  x   12-21    PT/INR - ( 20 Dec 2018 13:35 )   PT: 14.1 sec;   INR: 1.22 ratio             I&O's Summary    20 Dec 2018 07:01  -  21 Dec 2018 07:00  --------------------------------------------------------  IN: 900 mL / OUT: 1660 mL / NET: -760 mL        Thank you for the opportunity to assist with the care of this patient.   [facility] [phone].

## 2018-12-21 NOTE — PROGRESS NOTE ADULT - NSHPATTENDINGPLANDISCUSS_GEN_ALL_CORE
Pt and RN
pt, rn
pt, rn
pt, rn, cardio, pulm, CTS
pt and family at bedside
RN, Patient
RN, Patient

## 2018-12-21 NOTE — PROGRESS NOTE ADULT - ASSESSMENT
62 y/o female with CAD s/p CABG and PCI, PAD s/p balloon angioplasty, diabetes mellitus, hypertension, hyperlipidemia admitted with complaints of worsening exertional shortness of breath. She was evaluated by cardiology and started on IV diuretics, for acute on chronic diastolic CHF. Stress test was done, showed inferior ischemia, medical management advised. She was noted to have CO2 narcosis, placed on NIV. Cardiology planned for right heart catheterisation given RV dysfunction. Dobutamine was given for RV dysfunction. She was noted to have contrast induced nephropathy, diuretics were held. Nephrology consulted, Losartan was held. IR was consulted for pleural effusion, underwent right thoracentesis. RRT was called, for lethargy, related to hypercapnia, improved with NIV. Diuretics were reinstated given worsening symptoms. Her renal function improved. Venofer was added for anemia. PT consulted, advised SINGH. Given improvement in respiratory status, she was started on oral diuretics. Her CXR showed opacification of right lung. Metolazone was added by nephrology for fluid overload. IR consulted for right thoracentesis, she underwent thoracentesis on 12/12, 1500 cc of fluid was removed. On 12/17,found unresponsive, code blue was called CPR initiated. Patient regained consciousness.  ABG following the event showed  compensated respiratory acidosis, with severe hypercapnia. Patient again transfer to ICU placed on AVAPs. Syncope work up completed; EEG: indicative of mild generalized background   slowing.  No epileptiform or seizure activity during this monitoring period.  CT head: non-remarkable. Clinical correlation advised. Patient respiratory status improved, now downgraded back to medical floor.       Syncope and collapse x 2  Likely Vasovagal  Will monitor    Acute on chronic diastolic congestive heart failure  Decrease diuretics due to contraction alkalosis  Will change to torsemide 40 mg qd from bid  CXR with worsening pleural effusion on 12/20. CTS contacted for possible drainage, s/p right PTC placement with 1000cc serosanguinous drainage. CXR reviewed, no pneumothorax noted with improvement of effusion. CTS to decide about ?pleurex to prevent recurrent effusion  c/w coreg  Add ACE when tolerated  Cardio appreciated    Hypercapnic & Hypoxic respiratory failure/ OHS/ KYREE.   Nocturnal and prn Bipap - NIV preferred due to progressive nature of her disease and to lower PCO2 and to avoid recurrent hospitalization  02 as needed - off HFO2; now on NCO2  Pleurex drainage if effusion continues to recur  NIV with Trilogy rather than Bipap on discharge due to the progressive nature of her condition to lower PC02 and to prevent recurrent hospitalization  Bipap was considered but rejected due to the progressive nature of the patient's condition   PSG and PFTs as outpt  Pulmonary on board   Hypoxia during rapid yesterday. ABG with mixed metabolic alkalosis & resp acidosis. Placed back on BiPAP noct & prn. Decrease diuretics due to contraction alkalosis  CXR with worsening pleural effusion on 12/20. CTS contacted for possible drainage, s/p right PTC placement with 1000cc serosanguinous drainage. CXR reviewed, no pneumothorax noted with improvement of effusion. CTS to decide about ?pleurex to prevent recurrent effusion      KYREE/ OHS   Plan as above     CAD s/p CABG and stent   Continue Plavix 75mg and Aspirin 325mg     PAD s/p balloon angioplasty  Continue Plavix 75mg and Aspirin 325mg     HTN/HLD  Continue home medications   HbA1C and lipid profile in AM     DM-2  On metformin 850mg BID will hold   Insulin sliding scale     Supportive  DVT prophylaxis: CSD  Diet: DASH/TLC 64 y/o female with CAD s/p CABG and PCI, PAD s/p balloon angioplasty, diabetes mellitus, hypertension, hyperlipidemia admitted with complaints of worsening exertional shortness of breath. She was evaluated by cardiology and started on IV diuretics, for acute on chronic diastolic CHF. Stress test was done, showed inferior ischemia, medical management advised. She was noted to have CO2 narcosis, placed on NIV. Cardiology planned for right heart catheterisation given RV dysfunction. Dobutamine was given for RV dysfunction. She was noted to have contrast induced nephropathy, diuretics were held. Nephrology consulted, Losartan was held. IR was consulted for pleural effusion, underwent right thoracentesis. RRT was called, for lethargy, related to hypercapnia, improved with NIV. Diuretics were reinstated given worsening symptoms. Her renal function improved. Venofer was added for anemia. PT consulted, advised SINGH. Given improvement in respiratory status, she was started on oral diuretics. Her CXR showed opacification of right lung. Metolazone was added by nephrology for fluid overload. IR consulted for right thoracentesis, she underwent thoracentesis on 12/12, 1500 cc of fluid was removed. On 12/17,found unresponsive, code blue was called CPR initiated. Patient regained consciousness.  ABG following the event showed  compensated respiratory acidosis, with severe hypercapnia. Patient again transfer to ICU placed on AVAPs. Syncope work up completed; EEG: indicative of mild generalized background   slowing.  No epileptiform or seizure activity during this monitoring period.  CT head: non-remarkable. Clinical correlation advised. Patient respiratory status improved, now downgraded back to medical floor.       Syncope and collapse x 2  Likely Vasovagal  Will monitor    Acute on chronic diastolic congestive heart failure  Decrease diuretics due to contraction alkalosis  Will change to torsemide 40 mg qd from bid  CXR with worsening pleural effusion on 12/20. CTS contacted for possible drainage, s/p right PTC placement with 1000cc serosanguinous drainage. CXR reviewed, no pneumothorax noted with improvement of effusion. CTS to decide about ?pleurex to prevent recurrent effusion  c/w coreg  Add ACE when tolerated  Cardio appreciated    Hypercapnic & Hypoxic respiratory failure/ OHS/ KYREE.   Nocturnal and prn Bipap - NIV preferred due to progressive nature of her disease and to lower PCO2 and to avoid recurrent hospitalization  02 as needed - off HFO2; now on NCO2  Pleurex drainage if effusion continues to recur  NIV with Trilogy rather than Bipap on discharge due to the progressive nature of her condition to lower PC02 and to prevent recurrent hospitalization  Bipap was considered but rejected due to the progressive nature of the patient's condition   PSG and PFTs as outpt  Pulmonary on board   Hypoxia during rapid yesterday. ABG with mixed metabolic alkalosis & resp acidosis. Placed back on BiPAP noct & prn. Decrease diuretics due to contraction alkalosis  CXR with worsening pleural effusion on 12/20. CTS contacted for possible drainage, s/p right PTC placement with 1000cc serosanguinous drainage. CXR reviewed, no pneumothorax noted with improvement of effusion. CTS to decide about ?pleurex to prevent recurrent effusion      KYREE/ OHS   Plan as above     CAD s/p CABG and stent   Continue Plavix 75mg and Aspirin 325mg     PAD s/p balloon angioplasty  Continue Plavix 75mg and Aspirin 325mg     HTN/HLD  Continue home medications   HbA1C and lipid profile in AM     DM-2  On metformin 850mg BID will hold   Insulin sliding scale     Pt complained of "red sputum" but none seen by RN. H/H with improvement since yesterday. Pt on ASA, plavix, DVT ppx. Will not hold until further evidence    Supportive  DVT prophylaxis: CSD  Diet: DASH/TLC 64 y/o female with CAD s/p CABG and PCI, PAD s/p balloon angioplasty, diabetes mellitus, hypertension, hyperlipidemia admitted with complaints of worsening exertional shortness of breath. She was evaluated by cardiology and started on IV diuretics, for acute on chronic diastolic CHF. Stress test was done, showed inferior ischemia, medical management advised. She was noted to have CO2 narcosis, placed on NIV. Cardiology planned for right heart catheterisation given RV dysfunction. Dobutamine was given for RV dysfunction. She was noted to have contrast induced nephropathy, diuretics were held. Nephrology consulted, Losartan was held. IR was consulted for pleural effusion, underwent right thoracentesis. RRT was called, for lethargy, related to hypercapnia, improved with NIV. Diuretics were reinstated given worsening symptoms. Her renal function improved. Venofer was added for anemia. PT consulted, advised SINGH. Given improvement in respiratory status, she was started on oral diuretics. Her CXR showed opacification of right lung. Metolazone was added by nephrology for fluid overload. IR consulted for right thoracentesis, she underwent thoracentesis on 12/12, 1500 cc of fluid was removed. On 12/17,found unresponsive, code blue was called CPR initiated. Patient regained consciousness.  ABG following the event showed  compensated respiratory acidosis, with severe hypercapnia. Patient again transfer to ICU placed on AVAPs. Syncope work up completed; EEG: indicative of mild generalized background   slowing.  No epileptiform or seizure activity during this monitoring period.  CT head: non-remarkable. Clinical correlation advised. Patient respiratory status improved, now downgraded back to medical floor. Hospital course complicated by syncope x 2, Acute on chronic diastolic failure & Hypercapneic/ hypoxic respiratory failure      Syncope and collapse x 2  Likely Vasovagal  Will monitor    Acute on chronic diastolic congestive heart failure  Decrease diuretics due to contraction alkalosis  Will change to torsemide 40 mg qd from bid  CXR with worsening pleural effusion on 12/20. CTS contacted for possible drainage, s/p right PTC placement with 1000cc serosanguinous drainage. CXR reviewed, no pneumothorax noted with improvement of effusion. CTS to decide about ?pleurex to prevent recurrent effusion  c/w coreg  Add ACE when tolerated  Cardio appreciated    Hypercapnic & Hypoxic respiratory failure/ OHS/ KYREE.   Nocturnal and prn Bipap - NIV preferred due to progressive nature of her disease and to lower PCO2 and to avoid recurrent hospitalization  02 as needed - off HFO2; now on NCO2  Pleurex drainage if effusion continues to recur  NIV with Trilogy rather than Bipap on discharge due to the progressive nature of her condition to lower PC02 and to prevent recurrent hospitalization  Bipap was considered but rejected due to the progressive nature of the patient's condition   PSG and PFTs as outpt  Pulmonary on board   Hypoxia during rapid yesterday. ABG with mixed metabolic alkalosis & resp acidosis. Placed back on BiPAP noct & prn. Decrease diuretics due to contraction alkalosis  CXR with worsening pleural effusion on 12/20. CTS contacted for possible drainage, s/p right PTC placement with 1000cc serosanguinous drainage. CXR reviewed, no pneumothorax noted with improvement of effusion. CTS to decide about ?pleurex to prevent recurrent effusion      KYREE/ OHS   Plan as above     CAD s/p CABG and stent   Continue Plavix 75mg and Aspirin 325mg     PAD s/p balloon angioplasty  Continue Plavix 75mg and Aspirin 325mg     HTN/HLD  Continue home medications   HbA1C and lipid profile in AM     DM-2  On metformin 850mg BID will hold   Insulin sliding scale     Pt complained of "red sputum" but none seen by RN. H/H with improvement since yesterday. Pt on ASA, plavix, DVT ppx. Will not hold until further evidence    Supportive  DVT prophylaxis: CSD  Diet: DASH/TLC

## 2018-12-21 NOTE — PROGRESS NOTE ADULT - SUBJECTIVE AND OBJECTIVE BOX
[place] [facility]                                                        Office: [address] [address]                                                       Telephone: [phone]. Fax: [phone]      CC: Shortness of breath    INTERVAL HISTORY: stable shortness of breath.     MEDICATIONS  (STANDING):  aspirin  chewable 81 milliGRAM(s) Oral daily  atorvastatin 40 milliGRAM(s) Oral at bedtime  carvedilol 25 milliGRAM(s) Oral every 12 hours  clopidogrel Tablet 75 milliGRAM(s) Oral daily  dextrose 5%. 1000 milliLiter(s) (50 mL/Hr) IV Continuous <Continuous>  dextrose 50% Injectable 12.5 Gram(s) IV Push once  dextrose 50% Injectable 25 Gram(s) IV Push once  dextrose 50% Injectable 25 Gram(s) IV Push once  docusate sodium 100 milliGRAM(s) Oral two times a day  DULoxetine 60 milliGRAM(s) Oral daily  enoxaparin Injectable 40 milliGRAM(s) SubCutaneous daily  epoetin sara Injectable 10457 Unit(s) SubCutaneous <User Schedule>  gabapentin 100 milliGRAM(s) Oral three times a day  insulin glargine Injectable (LANTUS) 7 Unit(s) SubCutaneous at bedtime  insulin lispro (HumaLOG) corrective regimen sliding scale   SubCutaneous three times a day before meals  insulin lispro Injectable (HumaLOG) 3 Unit(s) SubCutaneous three times a day with meals  isosorbide   mononitrate ER Tablet (IMDUR) 60 milliGRAM(s) Oral daily  pantoprazole    Tablet 40 milliGRAM(s) Oral before breakfast  torsemide 40 milliGRAM(s) Oral daily    ROS: All others negative     PHYSICAL EXAM:  T(C): 37.3 (12-21-18 @ 15:20), Max: 37.3 (12-21-18 @ 15:20)  HR: 72 (12-21-18 @ 17:08) (72 - 80)  BP: 108/49 (12-21-18 @ 17:08) (102/53 - 132/68)  RR: 18 (12-21-18 @ 15:20) (16 - 18)  SpO2: 100% (12-21-18 @ 15:20) (98% - 100%)  Wt(kg): --  I&O's Summary    20 Dec 2018 07:01  -  21 Dec 2018 07:00  --------------------------------------------------------  IN: 900 mL / OUT: 1660 mL / NET: -760 mL    21 Dec 2018 07:01  -  21 Dec 2018 19:14  --------------------------------------------------------  IN: 0 mL / OUT: 160 mL / NET: -160 mL      Appearance: Normal	  HEENT:  limited vision.   Lymphatic: No lymphadenopathy  Cardiovascular: Normal S1 S2, No JVD, No murmurs,   Respiratory: Lungs clear to auscultation	  Psychiatry: not alert.   Gastrointestinal:  Soft, Non-tender, + BS	  Skin: No rashes, No ecchymoses, No cyanosis  Neurologic: Non-focal  Extremities: Normal range of motion, No clubbing, cyanosis.    TELEMETRY: 	      LABS:	 	                        8.4    7.9   )-----------( 218      ( 21 Dec 2018 05:56 )             25.2     12-21    142  |  94<L>  |  44.0<H>  ----------------------------<  140<H>  4.3   |  40.0<H>  |  0.95    Ca    8.5<L>      21 Dec 2018 05:56  Phos  2.4     12-21  Mg     1.9     12-21    TPro  x   /  Alb  2.6<L>  /  TBili  x   /  DBili  x   /  AST  x   /  ALT  x   /  AlkPhos  x   12-21

## 2018-12-22 DIAGNOSIS — I25.118 ATHEROSCLEROTIC HEART DISEASE OF NATIVE CORONARY ARTERY WITH OTHER FORMS OF ANGINA PECTORIS: ICD-10-CM

## 2018-12-22 LAB
ANION GAP SERPL CALC-SCNC: 8 MMOL/L — SIGNIFICANT CHANGE UP (ref 5–17)
ANISOCYTOSIS BLD QL: SIGNIFICANT CHANGE UP
BASOPHILS # BLD AUTO: 0 K/UL — SIGNIFICANT CHANGE UP (ref 0–0.2)
BASOPHILS NFR BLD AUTO: 0.2 % — SIGNIFICANT CHANGE UP (ref 0–2)
BUN SERPL-MCNC: 43 MG/DL — HIGH (ref 8–20)
CALCIUM SERPL-MCNC: 8.1 MG/DL — LOW (ref 8.6–10.2)
CHLORIDE SERPL-SCNC: 94 MMOL/L — LOW (ref 98–107)
CO2 SERPL-SCNC: 39 MMOL/L — HIGH (ref 22–29)
CREAT SERPL-MCNC: 0.97 MG/DL — SIGNIFICANT CHANGE UP (ref 0.5–1.3)
EOSINOPHIL # BLD AUTO: 0.3 K/UL — SIGNIFICANT CHANGE UP (ref 0–0.5)
EOSINOPHIL NFR BLD AUTO: 5.6 % — SIGNIFICANT CHANGE UP (ref 0–6)
GLUCOSE BLDC GLUCOMTR-MCNC: 153 MG/DL — HIGH (ref 70–99)
GLUCOSE BLDC GLUCOMTR-MCNC: 208 MG/DL — HIGH (ref 70–99)
GLUCOSE BLDC GLUCOMTR-MCNC: 282 MG/DL — HIGH (ref 70–99)
GLUCOSE BLDC GLUCOMTR-MCNC: 283 MG/DL — HIGH (ref 70–99)
GLUCOSE SERPL-MCNC: 174 MG/DL — HIGH (ref 70–115)
HCT VFR BLD CALC: 24.3 % — LOW (ref 37–47)
HGB BLD-MCNC: 7.9 G/DL — LOW (ref 12–16)
HYPOCHROMIA BLD QL: SLIGHT — SIGNIFICANT CHANGE UP
LYMPHOCYTES # BLD AUTO: 1.3 K/UL — SIGNIFICANT CHANGE UP (ref 1–4.8)
LYMPHOCYTES # BLD AUTO: 20.8 % — SIGNIFICANT CHANGE UP (ref 20–55)
MACROCYTES BLD QL: SLIGHT — SIGNIFICANT CHANGE UP
MAGNESIUM SERPL-MCNC: 1.8 MG/DL — SIGNIFICANT CHANGE UP (ref 1.6–2.6)
MCHC RBC-ENTMCNC: 27.5 PG — SIGNIFICANT CHANGE UP (ref 27–31)
MCHC RBC-ENTMCNC: 32.5 G/DL — SIGNIFICANT CHANGE UP (ref 32–36)
MCV RBC AUTO: 84.7 FL — SIGNIFICANT CHANGE UP (ref 81–99)
MICROCYTES BLD QL: SLIGHT — SIGNIFICANT CHANGE UP
MONOCYTES # BLD AUTO: 0.4 K/UL — SIGNIFICANT CHANGE UP (ref 0–0.8)
MONOCYTES NFR BLD AUTO: 7.2 % — SIGNIFICANT CHANGE UP (ref 3–10)
NEUTROPHILS # BLD AUTO: 4 K/UL — SIGNIFICANT CHANGE UP (ref 1.8–8)
NEUTROPHILS NFR BLD AUTO: 65.9 % — SIGNIFICANT CHANGE UP (ref 37–73)
NIGHT BLUE STAIN TISS: SIGNIFICANT CHANGE UP
PHOSPHATE SERPL-MCNC: 2.5 MG/DL — SIGNIFICANT CHANGE UP (ref 2.4–4.7)
PLAT MORPH BLD: NORMAL — SIGNIFICANT CHANGE UP
PLATELET # BLD AUTO: 197 K/UL — SIGNIFICANT CHANGE UP (ref 150–400)
POIKILOCYTOSIS BLD QL AUTO: SLIGHT — SIGNIFICANT CHANGE UP
POTASSIUM SERPL-MCNC: 3.8 MMOL/L — SIGNIFICANT CHANGE UP (ref 3.5–5.3)
POTASSIUM SERPL-SCNC: 3.8 MMOL/L — SIGNIFICANT CHANGE UP (ref 3.5–5.3)
RBC # BLD: 2.87 M/UL — LOW (ref 4.4–5.2)
RBC # FLD: 20.6 % — HIGH (ref 11–15.6)
RBC BLD AUTO: ABNORMAL
SODIUM SERPL-SCNC: 141 MMOL/L — SIGNIFICANT CHANGE UP (ref 135–145)
SPECIMEN SOURCE: SIGNIFICANT CHANGE UP
TARGETS BLD QL SMEAR: SIGNIFICANT CHANGE UP
WBC # BLD: 6.1 K/UL — SIGNIFICANT CHANGE UP (ref 4.8–10.8)
WBC # FLD AUTO: 6.1 K/UL — SIGNIFICANT CHANGE UP (ref 4.8–10.8)

## 2018-12-22 PROCEDURE — 99232 SBSQ HOSP IP/OBS MODERATE 35: CPT

## 2018-12-22 RX ADMIN — GABAPENTIN 100 MILLIGRAM(S): 400 CAPSULE ORAL at 05:29

## 2018-12-22 RX ADMIN — GABAPENTIN 100 MILLIGRAM(S): 400 CAPSULE ORAL at 23:46

## 2018-12-22 RX ADMIN — INSULIN GLARGINE 7 UNIT(S): 100 INJECTION, SOLUTION SUBCUTANEOUS at 23:48

## 2018-12-22 RX ADMIN — Medication 650 MILLIGRAM(S): at 06:10

## 2018-12-22 RX ADMIN — DULOXETINE HYDROCHLORIDE 60 MILLIGRAM(S): 30 CAPSULE, DELAYED RELEASE ORAL at 11:34

## 2018-12-22 RX ADMIN — CARVEDILOL PHOSPHATE 25 MILLIGRAM(S): 80 CAPSULE, EXTENDED RELEASE ORAL at 17:29

## 2018-12-22 RX ADMIN — Medication 81 MILLIGRAM(S): at 11:34

## 2018-12-22 RX ADMIN — ERYTHROPOIETIN 40000 UNIT(S): 10000 INJECTION, SOLUTION INTRAVENOUS; SUBCUTANEOUS at 11:35

## 2018-12-22 RX ADMIN — Medication 650 MILLIGRAM(S): at 14:50

## 2018-12-22 RX ADMIN — CARVEDILOL PHOSPHATE 25 MILLIGRAM(S): 80 CAPSULE, EXTENDED RELEASE ORAL at 05:29

## 2018-12-22 RX ADMIN — Medication 3 UNIT(S): at 17:29

## 2018-12-22 RX ADMIN — CLOPIDOGREL BISULFATE 75 MILLIGRAM(S): 75 TABLET, FILM COATED ORAL at 11:34

## 2018-12-22 RX ADMIN — GABAPENTIN 100 MILLIGRAM(S): 400 CAPSULE ORAL at 13:43

## 2018-12-22 RX ADMIN — ISOSORBIDE MONONITRATE 60 MILLIGRAM(S): 60 TABLET, EXTENDED RELEASE ORAL at 11:34

## 2018-12-22 RX ADMIN — ATORVASTATIN CALCIUM 40 MILLIGRAM(S): 80 TABLET, FILM COATED ORAL at 23:47

## 2018-12-22 RX ADMIN — Medication 650 MILLIGRAM(S): at 16:32

## 2018-12-22 RX ADMIN — LACTULOSE 10 GRAM(S): 10 SOLUTION ORAL at 23:47

## 2018-12-22 RX ADMIN — Medication 650 MILLIGRAM(S): at 05:28

## 2018-12-22 RX ADMIN — Medication 100 MILLIGRAM(S): at 05:29

## 2018-12-22 RX ADMIN — Medication 100 MILLIGRAM(S): at 17:29

## 2018-12-22 RX ADMIN — Medication 3 UNIT(S): at 12:47

## 2018-12-22 RX ADMIN — Medication 650 MILLIGRAM(S): at 23:47

## 2018-12-22 RX ADMIN — Medication 40 MILLIGRAM(S): at 05:29

## 2018-12-22 RX ADMIN — Medication 3: at 12:44

## 2018-12-22 RX ADMIN — PANTOPRAZOLE SODIUM 40 MILLIGRAM(S): 20 TABLET, DELAYED RELEASE ORAL at 05:29

## 2018-12-22 RX ADMIN — Medication 3 UNIT(S): at 08:41

## 2018-12-22 RX ADMIN — ENOXAPARIN SODIUM 40 MILLIGRAM(S): 100 INJECTION SUBCUTANEOUS at 23:48

## 2018-12-22 RX ADMIN — Medication 3: at 17:29

## 2018-12-22 RX ADMIN — Medication 1: at 08:42

## 2018-12-22 NOTE — PROGRESS NOTE ADULT - SUBJECTIVE AND OBJECTIVE BOX
[place] [facility]                                                        Office: [address] [address]                                                       Telephone: [phone]. Fax: [phone]      CC: Shortness of breath.     INTERVAL HISTORY: Improved symptoms of shortness of breath.     MEDICATIONS  (STANDING):  aspirin  chewable 81 milliGRAM(s) Oral daily  atorvastatin 40 milliGRAM(s) Oral at bedtime  carvedilol 25 milliGRAM(s) Oral every 12 hours  clopidogrel Tablet 75 milliGRAM(s) Oral daily  dextrose 5%. 1000 milliLiter(s) (50 mL/Hr) IV Continuous <Continuous>  dextrose 50% Injectable 12.5 Gram(s) IV Push once  dextrose 50% Injectable 25 Gram(s) IV Push once  dextrose 50% Injectable 25 Gram(s) IV Push once  docusate sodium 100 milliGRAM(s) Oral two times a day  DULoxetine 60 milliGRAM(s) Oral daily  enoxaparin Injectable 40 milliGRAM(s) SubCutaneous daily  epoetin sara Injectable 90890 Unit(s) SubCutaneous <User Schedule>  gabapentin 100 milliGRAM(s) Oral three times a day  insulin glargine Injectable (LANTUS) 7 Unit(s) SubCutaneous at bedtime  insulin lispro (HumaLOG) corrective regimen sliding scale   SubCutaneous three times a day before meals  insulin lispro Injectable (HumaLOG) 3 Unit(s) SubCutaneous three times a day with meals  isosorbide   mononitrate ER Tablet (IMDUR) 60 milliGRAM(s) Oral daily  pantoprazole    Tablet 40 milliGRAM(s) Oral before breakfast  torsemide 40 milliGRAM(s) Oral daily    ROS: All others negative     PHYSICAL EXAM:  T(C): 36.8 (12-22-18 @ 16:29), Max: 37.1 (12-21-18 @ 22:47)  HR: 70 (12-22-18 @ 17:00) (68 - 78)  BP: 120/58 (12-22-18 @ 17:00) (110/70 - 144/70)  RR: 16 (12-22-18 @ 16:29) (16 - 18)  SpO2: 100% (12-22-18 @ 16:29) (96% - 100%)  Wt(kg): --  I&O's Summary    21 Dec 2018 07:01  -  22 Dec 2018 07:00  --------------------------------------------------------  IN: 0 mL / OUT: 960 mL / NET: -960 mL    22 Dec 2018 07:01  -  22 Dec 2018 17:38  --------------------------------------------------------  IN: 360 mL / OUT: 930 mL / NET: -570 mL      Appearance: Normal	  HEENT:   Limited vision.   Lymphatic: No lymphadenopathy  Cardiovascular: Normal S1 S2, No JVD, No murmurs, No edema  Respiratory: Lungs clear to auscultation	  Psychiatry: A & O x 3, Mood & affect appropriate  Gastrointestinal:  Soft, Non-tender, + BS	  Skin: No rashes, No ecchymoses, No cyanosis  Neurologic: Non-focal  Extremities: Normal range of motion, No clubbing, cyanosis or edema  Vascular: Peripheral pulses palpable 2+ bilaterally    TELEMETRY: 	      LABS:	 	                        7.9    6.1   )-----------( 197      ( 22 Dec 2018 05:36 )             24.3     12-22    141  |  94<L>  |  43.0<H>  ----------------------------<  174<H>  3.8   |  39.0<H>  |  0.97    Ca    8.1<L>      22 Dec 2018 05:36  Phos  2.5     12-22  Mg     1.8     12-22    TPro  x   /  Alb  2.6<L>  /  TBili  x   /  DBili  x   /  AST  x   /  ALT  x   /  AlkPhos  x   12-21

## 2018-12-22 NOTE — PROGRESS NOTE ADULT - SUBJECTIVE AND OBJECTIVE BOX
KUN KATEGINAMBERDELCLARA  ----------------------------------------  The patient was seen and evaluated for heart failure.  The patient is in no acute distress.  Denied any chest pain, palpitations, dyspnea, or abdominal pain.  Reports feeling better.    Vital Signs Last 24 Hrs  T(C): 36.8 (22 Dec 2018 10:00), Max: 37.3 (21 Dec 2018 15:20)  T(F): 98.2 (22 Dec 2018 10:00), Max: 99.2 (21 Dec 2018 15:20)  HR: 74 (22 Dec 2018 10:00) (68 - 78)  BP: 120/60 (22 Dec 2018 11:36) (108/49 - 144/70)  BP(mean): --  RR: 18 (22 Dec 2018 10:00) (16 - 18)  SpO2: 99% (22 Dec 2018 09:23) (96% - 100%)    CAPILLARY BLOOD GLUCOSE  POCT Blood Glucose.: 282 mg/dL (22 Dec 2018 12:06)  POCT Blood Glucose.: 153 mg/dL (22 Dec 2018 08:10)  POCT Blood Glucose.: 224 mg/dL (21 Dec 2018 22:48)  POCT Blood Glucose.: 255 mg/dL (21 Dec 2018 21:30)  POCT Blood Glucose.: 219 mg/dL (21 Dec 2018 17:02)    PHYSICAL EXAMINATION:  ----------------------------------------  General appearance: No acute distress, Awake, Alert  HEENT: Normocephalic, Atraumatic, Conjunctiva clear, EOMI  Neck: Supple, No JVD, No tenderness  Lungs: Clear to auscultation, Breath sound equal bilaterally, No wheezes, No rales, Chest tube in place  Cardiovascular: S1S2, Regular rhythm  Abdomen: Soft, Nontender, Nondistended, No guarding/rebound, Positive bowel sounds  Extremities: No clubbing, No cyanosis, No edema, No calf tenderness  Neuro: Strength equal bilaterally, No tremors  Psychiatric: Appropriate mood, Normal affect    LABORATORY STUDIES:  ----------------------------------------             7.9    6.1   )-----------( 197      ( 22 Dec 2018 05:36 )             24.3     12-22    141  |  94<L>  |  43.0<H>  ----------------------------<  174<H>  3.8   |  39.0<H>  |  0.97    Ca    8.1<L>      22 Dec 2018 05:36  Phos  2.5     12-22  Mg     1.8     12-22    TPro  x   /  Alb  2.6<L>  /  TBili  x   /  DBili  x   /  AST  x   /  ALT  x   /  AlkPhos  x   12-21    LIVER FUNCTIONS - ( 21 Dec 2018 12:03 )  Alb: 2.6 g/dL / Pro: x     / ALK PHOS: x     / ALT: x     / AST: x     / GGT: x           PT/INR - ( 20 Dec 2018 13:35 )   PT: 14.1 sec;   INR: 1.22 ratio      Culture - Acid Fast - Body Fluid w/Smear (collected 21 Dec 2018 17:38)  Source: .Body Fluid    Culture - Body Fluid with Gram Stain (collected 20 Dec 2018 15:33)  Source: .Body Fluid  Gram Stain (20 Dec 2018 16:47):    Few White blood cells    No organisms seen  Preliminary Report (21 Dec 2018 17:27):    No growth at 1 day.    Culture in progress    MEDICATIONS  (STANDING):  aspirin  chewable 81 milliGRAM(s) Oral daily  atorvastatin 40 milliGRAM(s) Oral at bedtime  carvedilol 25 milliGRAM(s) Oral every 12 hours  clopidogrel Tablet 75 milliGRAM(s) Oral daily  dextrose 5%. 1000 milliLiter(s) (50 mL/Hr) IV Continuous <Continuous>  dextrose 50% Injectable 12.5 Gram(s) IV Push once  dextrose 50% Injectable 25 Gram(s) IV Push once  dextrose 50% Injectable 25 Gram(s) IV Push once  docusate sodium 100 milliGRAM(s) Oral two times a day  DULoxetine 60 milliGRAM(s) Oral daily  enoxaparin Injectable 40 milliGRAM(s) SubCutaneous daily  epoetin sara Injectable 47293 Unit(s) SubCutaneous <User Schedule>  gabapentin 100 milliGRAM(s) Oral three times a day  insulin glargine Injectable (LANTUS) 7 Unit(s) SubCutaneous at bedtime  insulin lispro (HumaLOG) corrective regimen sliding scale   SubCutaneous three times a day before meals  insulin lispro Injectable (HumaLOG) 3 Unit(s) SubCutaneous three times a day with meals  isosorbide   mononitrate ER Tablet (IMDUR) 60 milliGRAM(s) Oral daily  pantoprazole    Tablet 40 milliGRAM(s) Oral before breakfast  torsemide 40 milliGRAM(s) Oral daily    MEDICATIONS  (PRN):  acetaminophen   Tablet .. 650 milliGRAM(s) Oral every 6 hours PRN Temp greater or equal to 38C (100.4F), Mild Pain (1 - 3)  bisacodyl Suppository 10 milliGRAM(s) Rectal daily PRN Constipation  dextrose 40% Gel 15 Gram(s) Oral once PRN Blood Glucose LESS THAN 70 milliGRAM(s)/deciliter  glucagon  Injectable 1 milliGRAM(s) IntraMuscular once PRN Glucose LESS THAN 70 milligrams/deciliter  lactulose Syrup 10 Gram(s) Oral three times a day PRN colnstipaiton      ASSESSMENT / PLAN:  ----------------------------------------  Acute on chronic diastolic heart failure - On carvedilol, isosorbide, and torsemide. Edema resolved.    Pleural effusion - Chest tube in place. Monitoring output, total of 560ml yesterday.    Acute on chronic hypercapnic and hypoxic respiratory failure / Obstructive sleep apnea - Supplemental oxygen. Encouraged the use of Bipap.    CAD / PAD - On aspirin, clopidogrel, and atorvastatin.    Diabetes - Insulin coverage, close monitoring of blood glucose levels.    Anemia - On epoetin sara. CBC to be followed.

## 2018-12-22 NOTE — CHART NOTE - NSCHARTNOTEFT_GEN_A_CORE
Thoracic Surgery    Pigtail catheter noted to continue to drain (> 500cc in 24 hours). Plan to keep in for now. S/P increased Demadex dosing and yesterday's xray appears to have increased left side effusion. Will continue to follow. CXR ordered for tomorrow morning. JUVENAL Escudero

## 2018-12-22 NOTE — PROGRESS NOTE ADULT - SUBJECTIVE AND OBJECTIVE BOX
NEPHROLOGY INTERVAL HPI/OVERNIGHT EVENTS:    No new events.    MEDICATIONS  (STANDING):  aspirin  chewable 81 milliGRAM(s) Oral daily  atorvastatin 40 milliGRAM(s) Oral at bedtime  carvedilol 25 milliGRAM(s) Oral every 12 hours  clopidogrel Tablet 75 milliGRAM(s) Oral daily  dextrose 5%. 1000 milliLiter(s) (50 mL/Hr) IV Continuous <Continuous>  dextrose 50% Injectable 12.5 Gram(s) IV Push once  dextrose 50% Injectable 25 Gram(s) IV Push once  dextrose 50% Injectable 25 Gram(s) IV Push once  docusate sodium 100 milliGRAM(s) Oral two times a day  DULoxetine 60 milliGRAM(s) Oral daily  epoetin sara Injectable 21268 Unit(s) SubCutaneous <User Schedule>  gabapentin 100 milliGRAM(s) Oral three times a day  insulin glargine Injectable (LANTUS) 7 Unit(s) SubCutaneous at bedtime  insulin lispro (HumaLOG) corrective regimen sliding scale   SubCutaneous three times a day before meals  insulin lispro Injectable (HumaLOG) 3 Unit(s) SubCutaneous three times a day with meals  isosorbide   mononitrate ER Tablet (IMDUR) 60 milliGRAM(s) Oral daily  metolazone 2.5 milliGRAM(s) Oral daily  pantoprazole    Tablet 40 milliGRAM(s) Oral before breakfast  torsemide 40 milliGRAM(s) Oral daily    MEDICATIONS  (PRN):  acetaminophen   Tablet .. 650 milliGRAM(s) Oral every 6 hours PRN Temp greater or equal to 38C (100.4F), Mild Pain (1 - 3)  bisacodyl Suppository 10 milliGRAM(s) Rectal daily PRN Constipation  dextrose 40% Gel 15 Gram(s) Oral once PRN Blood Glucose LESS THAN 70 milliGRAM(s)/deciliter  glucagon  Injectable 1 milliGRAM(s) IntraMuscular once PRN Glucose LESS THAN 70 milligrams/deciliter  lactulose Syrup 10 Gram(s) Oral three times a day PRN colnstipaiton      Allergies    Accupril (Other)          Vital Signs Last 24 HrsVital Signs Last 24 Hrs  T(C): 36.9 (22 Dec 2018 05:17), Max: 37.3 (21 Dec 2018 15:20)  T(F): 98.5 (22 Dec 2018 05:17), Max: 99.2 (21 Dec 2018 15:20)  HR: 73 (22 Dec 2018 05:17) (72 - 78)  BP: 144/70 (22 Dec 2018 05:17) (102/53 - 144/70)  BP(mean): --  RR: 16 (22 Dec 2018 05:17) (16 - 18)  SpO2: 100% (22 Dec 2018 05:17) (96% - 100%)    T(C): 36.6 (21 Dec 2018 05:26), Max: 36.9 (20 Dec 2018 10:30)  T(F): 97.8 (21 Dec 2018 05:26), Max: 98.5 (20 Dec 2018 10:30)  HR: 76 (21 Dec 2018 05:26) (69 - 80)  BP: 132/68 (21 Dec 2018 05:26) (92/53 - 141/65)  BP(mean): --  RR: 18 (21 Dec 2018 05:26) (16 - 18)  SpO2: 100% (21 Dec 2018 05:26) (98% - 100%)  T(C): 36.9 (20 Dec 2018 10:30), Max: 37.1 (20 Dec 2018 05:13)  T(F): 98.5 (20 Dec 2018 10:30), Max: 98.7 (20 Dec 2018 05:13)  HR: 79 (20 Dec 2018 11:40) (68 - 85)  BP: 141/65 (20 Dec 2018 10:30) (137/58 - 154/62)  BP(mean): --  RR: 18 (20 Dec 2018 10:30) (16 - 20)          PHYSICAL EXAM:    GENERAL: Appears  chronically ill, in bed.  HEENT: same  NECK: Supple, +JVD  NERVOUS SYSTEM:  Alert & Oriented in bed  CHEST/LUNG: Right chest tube with pink drainage; nasal 02  HEART: Regular rate and rhythm; no rub  ABDOMEN: Soft, Nontender,+ distention; +BS; + flank edema  EXTREMITIES:   legs same    LABS:                        8.2    6.7   )-----------( 210      ( 20 Dec 2018 06:32 )             24.9     12-20    142  |  90<L>  |  51.0<H>  ----------------------------<  179<H>  4.1   |  46.0<HH>  |  1.15    Ca    9.0      20 Dec 2018 06:32  Phos  2.2     12-20  Mg     1.9     12-20      PT/INR - ( 20 Dec 2018 13:35 )   PT: 14.1 sec;   INR: 1.22 ratio          EXAM:  XR CHEST PORTABLE URGENT 1V                          PROCEDURE DATE:  12/20/2018          INTERPRETATION:  Portable chest radiograph        CLINICAL INFORMATION:   Unresponsive . assess for congestive heart   failure.    TECHNIQUE:  Portable  AP view of the chest was obtained.    COMPARISON: 12/16/2018 available for review.    FINDINGS:   The lungs  show RIGHT lower lobe airspace consolidation and moderate   RIGHT effusion layering along posterior RIGHT thoracic wall. LEFT lung   clear.        The  heart is enlarged in transverse diameter. No hilar mass. Trachea   midline.       . Status post coronary artery bypass graft procedure. Visualized osseous   structures are intact.        IMPRESSION:   Cardiomegaly.  RIGHT lower lobe airspace consolidation and effusion..                  PROCEDURE DATE:  12/01/2018          INTERPRETATION:  CT ABDOMEN AND PELVIS IC    HISTORY:  ascites    Technique: CT of the abdomen and pelvis is performed without oral with   intravenous contrast. Axial images are supplemented with coronal and   sagittal reformations. This study was performed using automatic exposure   control (radiation dose reduction software) to obtain a diagnostic image   quality scan with patient dose as low as reasonably achievable.    Contrast: 95 cc Omnipaque 300    Comparison: CT abdomen and pelvis 11/25/2018    Findings:  LIVER: Heterogeneous enhancement with diffusely low attenuation may be on   the basis of steatosis and/or passive congestion from right-sided heart   failure.  SPLEEN: Normal.  PANCREAS: Normal.  GALLBLADDER/BILIARY TREE: Nondilated. Normal gallbladder.  ADRENALS: Normal.  KIDNEYS: Nonobstructing right renal stones.  LYMPHADENOPATHY/RETROPERITONEUM: No adenopathy.  VASCULATURE: Aortoiliac atherosclerosis without aneurysm. Left   femoral-distal bypass graft is patent in the visualized portions.   Overlying postoperative collection again noted.  BOWEL: No bowel-related abnormality. Specifically, no bowel obstruction.  PELVIC VISCERA: No uterine or adnexal abnormality.  PELVIC LYMPH NODES: No pelvic adenopathy.  PERITONEUM/ABDOMINAL WALL: Small ascites. No free air. Diffuse body wall   edema.  SKELETAL: No acute bony abnormality.  LUNG BASES: Please refer to separately reported chest CT.    IMPRESSION:     Heterogeneous enhancement of the liver with diffusely low attenuation may   be on the basis of steatosis and/or passive congestion from right-sided   heart failure.    Anasarca characterized by small volume ascites, diffusebody wall edema,   and bilateral pleural effusions.            Magnesium, Serum: 1.9 mg/dL (12-20 @ 06:32)  Phosphorus Level, Serum: 2.2 mg/dL (12-20 @ 06:32)    ABG - ( 20 Dec 2018 08:37 )  pH, Arterial: 7.47  pH, Blood: x     /  pCO2: 65    /  pO2: 64    / HCO3: 46    / Base Excess: 21.2  /  SaO2: 93                    RADIOLOGY & ADDITIONAL TESTS:

## 2018-12-22 NOTE — PROGRESS NOTE ADULT - PROBLEM SELECTOR PLAN 1
Patient with improved volume status. On torsemide. Continue.  No further workup.   Chest tube still draining. Will monitor.

## 2018-12-23 LAB
ALBUMIN SERPL ELPH-MCNC: 2.4 G/DL — LOW (ref 3.3–5.2)
ALP SERPL-CCNC: 146 U/L — HIGH (ref 40–120)
ALT FLD-CCNC: 17 U/L — SIGNIFICANT CHANGE UP
ANION GAP SERPL CALC-SCNC: 6 MMOL/L — SIGNIFICANT CHANGE UP (ref 5–17)
AST SERPL-CCNC: 23 U/L — SIGNIFICANT CHANGE UP
BILIRUB SERPL-MCNC: 0.7 MG/DL — SIGNIFICANT CHANGE UP (ref 0.4–2)
BUN SERPL-MCNC: 42 MG/DL — HIGH (ref 8–20)
CALCIUM SERPL-MCNC: 8.1 MG/DL — LOW (ref 8.6–10.2)
CHLORIDE SERPL-SCNC: 97 MMOL/L — LOW (ref 98–107)
CO2 SERPL-SCNC: 38 MMOL/L — HIGH (ref 22–29)
CREAT SERPL-MCNC: 0.92 MG/DL — SIGNIFICANT CHANGE UP (ref 0.5–1.3)
GLUCOSE BLDC GLUCOMTR-MCNC: 139 MG/DL — HIGH (ref 70–99)
GLUCOSE BLDC GLUCOMTR-MCNC: 172 MG/DL — HIGH (ref 70–99)
GLUCOSE BLDC GLUCOMTR-MCNC: 254 MG/DL — HIGH (ref 70–99)
GLUCOSE BLDC GLUCOMTR-MCNC: 269 MG/DL — HIGH (ref 70–99)
GLUCOSE SERPL-MCNC: 134 MG/DL — HIGH (ref 70–115)
HCT VFR BLD CALC: 23.6 % — LOW (ref 37–47)
HGB BLD-MCNC: 8 G/DL — LOW (ref 12–16)
MCHC RBC-ENTMCNC: 27.7 PG — SIGNIFICANT CHANGE UP (ref 27–31)
MCHC RBC-ENTMCNC: 33.9 G/DL — SIGNIFICANT CHANGE UP (ref 32–36)
MCV RBC AUTO: 81.7 FL — SIGNIFICANT CHANGE UP (ref 81–99)
PLATELET # BLD AUTO: 210 K/UL — SIGNIFICANT CHANGE UP (ref 150–400)
POTASSIUM SERPL-MCNC: 3.8 MMOL/L — SIGNIFICANT CHANGE UP (ref 3.5–5.3)
POTASSIUM SERPL-SCNC: 3.8 MMOL/L — SIGNIFICANT CHANGE UP (ref 3.5–5.3)
PROT SERPL-MCNC: 6.5 G/DL — LOW (ref 6.6–8.7)
RBC # BLD: 2.89 M/UL — LOW (ref 4.4–5.2)
RBC # FLD: 20.3 % — HIGH (ref 11–15.6)
SODIUM SERPL-SCNC: 141 MMOL/L — SIGNIFICANT CHANGE UP (ref 135–145)
WBC # BLD: 6.1 K/UL — SIGNIFICANT CHANGE UP (ref 4.8–10.8)
WBC # FLD AUTO: 6.1 K/UL — SIGNIFICANT CHANGE UP (ref 4.8–10.8)

## 2018-12-23 PROCEDURE — 99232 SBSQ HOSP IP/OBS MODERATE 35: CPT

## 2018-12-23 RX ADMIN — DULOXETINE HYDROCHLORIDE 60 MILLIGRAM(S): 30 CAPSULE, DELAYED RELEASE ORAL at 11:36

## 2018-12-23 RX ADMIN — Medication 1: at 12:20

## 2018-12-23 RX ADMIN — Medication 650 MILLIGRAM(S): at 00:17

## 2018-12-23 RX ADMIN — PANTOPRAZOLE SODIUM 40 MILLIGRAM(S): 20 TABLET, DELAYED RELEASE ORAL at 06:51

## 2018-12-23 RX ADMIN — ENOXAPARIN SODIUM 40 MILLIGRAM(S): 100 INJECTION SUBCUTANEOUS at 11:36

## 2018-12-23 RX ADMIN — CARVEDILOL PHOSPHATE 25 MILLIGRAM(S): 80 CAPSULE, EXTENDED RELEASE ORAL at 06:51

## 2018-12-23 RX ADMIN — Medication 3 UNIT(S): at 12:19

## 2018-12-23 RX ADMIN — Medication 650 MILLIGRAM(S): at 23:17

## 2018-12-23 RX ADMIN — Medication 40 MILLIGRAM(S): at 06:51

## 2018-12-23 RX ADMIN — ISOSORBIDE MONONITRATE 60 MILLIGRAM(S): 60 TABLET, EXTENDED RELEASE ORAL at 11:36

## 2018-12-23 RX ADMIN — CLOPIDOGREL BISULFATE 75 MILLIGRAM(S): 75 TABLET, FILM COATED ORAL at 11:36

## 2018-12-23 RX ADMIN — GABAPENTIN 100 MILLIGRAM(S): 400 CAPSULE ORAL at 06:51

## 2018-12-23 RX ADMIN — Medication 3: at 18:13

## 2018-12-23 RX ADMIN — Medication 100 MILLIGRAM(S): at 06:53

## 2018-12-23 RX ADMIN — INSULIN GLARGINE 7 UNIT(S): 100 INJECTION, SOLUTION SUBCUTANEOUS at 23:17

## 2018-12-23 RX ADMIN — Medication 3 UNIT(S): at 08:38

## 2018-12-23 RX ADMIN — GABAPENTIN 100 MILLIGRAM(S): 400 CAPSULE ORAL at 13:02

## 2018-12-23 RX ADMIN — CARVEDILOL PHOSPHATE 25 MILLIGRAM(S): 80 CAPSULE, EXTENDED RELEASE ORAL at 18:49

## 2018-12-23 RX ADMIN — GABAPENTIN 100 MILLIGRAM(S): 400 CAPSULE ORAL at 23:36

## 2018-12-23 RX ADMIN — Medication 100 MILLIGRAM(S): at 18:14

## 2018-12-23 RX ADMIN — Medication 3 UNIT(S): at 18:13

## 2018-12-23 RX ADMIN — ATORVASTATIN CALCIUM 40 MILLIGRAM(S): 80 TABLET, FILM COATED ORAL at 23:17

## 2018-12-23 RX ADMIN — Medication 81 MILLIGRAM(S): at 11:36

## 2018-12-23 NOTE — PHYSICAL THERAPY INITIAL EVALUATION ADULT - PLANNED THERAPY INTERVENTIONS, PT EVAL
gait training/bed mobility training/transfer training
strengthening/gait training/transfer training/bed mobility training

## 2018-12-23 NOTE — PHYSICAL THERAPY INITIAL EVALUATION ADULT - IMPAIRMENTS FOUND, PT EVAL
gait, locomotion, and balance
gait, locomotion, and balance/aerobic capacity/endurance/muscle strength

## 2018-12-23 NOTE — PHYSICAL THERAPY INITIAL EVALUATION ADULT - GENERAL OBSERVATIONS, REHAB EVAL
Pt. greeted resting OOB in chair (+) O2 via NC, (+) IV lock, (+) cardiac monitor, agreeable to PT
Pt. rec'd in bed (+) O2 via NC, (+) IV lock, (+) cardiac monitor, (+) chest tube and  agreeable to PT

## 2018-12-23 NOTE — PROGRESS NOTE ADULT - SUBJECTIVE AND OBJECTIVE BOX
KUN PERESDELCLARA  ----------------------------------------  The patient was seen and evaluated for pleural effusion.  The patient is in no acute distress.  Denied any chest pain, palpitations, dyspnea, or abdominal pain.  Feels better.    Vital Signs Last 24 Hrs  T(C): 36.7 (23 Dec 2018 10:00), Max: 36.8 (22 Dec 2018 16:29)  T(F): 98 (23 Dec 2018 10:00), Max: 98.3 (22 Dec 2018 16:29)  HR: 66 (23 Dec 2018 10:00) (65 - 72)  BP: 115/62 (23 Dec 2018 10:00) (112/60 - 122/60)  BP(mean): --  RR: 18 (23 Dec 2018 10:00) (16 - 18)  SpO2: 100% (23 Dec 2018 06:00) (100% - 100%)    CAPILLARY BLOOD GLUCOSE  POCT Blood Glucose.: 172 mg/dL (23 Dec 2018 12:04)  POCT Blood Glucose.: 139 mg/dL (23 Dec 2018 08:13)  POCT Blood Glucose.: 208 mg/dL (22 Dec 2018 21:45)  POCT Blood Glucose.: 283 mg/dL (22 Dec 2018 17:14)    PHYSICAL EXAMINATION:  ----------------------------------------  General appearance: No acute distress, Awake, Alert  HEENT: Normocephalic, Atraumatic, Conjunctiva clear, EOMI  Neck: Supple, No JVD, No tenderness  Lungs: Clear to auscultation, Breath sound equal bilaterally, No wheezes, No rales, Chest tube in place  Cardiovascular: S1S2, Regular rhythm  Abdomen: Soft, Nontender, Nondistended, No guarding/rebound, Positive bowel sounds  Extremities: No clubbing, No cyanosis, No edema, No calf tenderness  Neuro: Strength equal bilaterally, No tremors    LABORATORY STUDIES:  ----------------------------------------             8.0    6.1   )-----------( 210      ( 23 Dec 2018 05:45 )             23.6     12-23    141  |  97<L>  |  42.0<H>  ----------------------------<  134<H>  3.8   |  38.0<H>  |  0.92    Ca    8.1<L>      23 Dec 2018 05:45  Phos  2.5     12-22  Mg     1.8     12-22    TPro  6.5<L>  /  Alb  2.4<L>  /  TBili  0.7  /  DBili  x   /  AST  23  /  ALT  17  /  AlkPhos  146<H>  12-23    LIVER FUNCTIONS - ( 23 Dec 2018 05:45 )  Alb: 2.4 g/dL / Pro: 6.5 g/dL / ALK PHOS: 146 U/L / ALT: 17 U/L / AST: 23 U/L / GGT: x           Culture - Acid Fast - Body Fluid w/Smear (collected 21 Dec 2018 17:38)  Source: .Body Fluid    Culture - Body Fluid with Gram Stain (collected 20 Dec 2018 15:33)  Source: .Body Fluid  Gram Stain (20 Dec 2018 16:47):    Few White blood cells    No organisms seen  Preliminary Report (22 Dec 2018 15:25):    No growth at 2 days.    Culture in progress    MEDICATIONS  (STANDING):  aspirin  chewable 81 milliGRAM(s) Oral daily  atorvastatin 40 milliGRAM(s) Oral at bedtime  carvedilol 25 milliGRAM(s) Oral every 12 hours  clopidogrel Tablet 75 milliGRAM(s) Oral daily  dextrose 5%. 1000 milliLiter(s) (50 mL/Hr) IV Continuous <Continuous>  dextrose 50% Injectable 12.5 Gram(s) IV Push once  dextrose 50% Injectable 25 Gram(s) IV Push once  dextrose 50% Injectable 25 Gram(s) IV Push once  docusate sodium 100 milliGRAM(s) Oral two times a day  DULoxetine 60 milliGRAM(s) Oral daily  enoxaparin Injectable 40 milliGRAM(s) SubCutaneous daily  epoetin sara Injectable 62341 Unit(s) SubCutaneous <User Schedule>  gabapentin 100 milliGRAM(s) Oral three times a day  insulin glargine Injectable (LANTUS) 7 Unit(s) SubCutaneous at bedtime  insulin lispro (HumaLOG) corrective regimen sliding scale   SubCutaneous three times a day before meals  insulin lispro Injectable (HumaLOG) 3 Unit(s) SubCutaneous three times a day with meals  isosorbide   mononitrate ER Tablet (IMDUR) 60 milliGRAM(s) Oral daily  pantoprazole    Tablet 40 milliGRAM(s) Oral before breakfast  torsemide 40 milliGRAM(s) Oral daily    MEDICATIONS  (PRN):  acetaminophen   Tablet .. 650 milliGRAM(s) Oral every 6 hours PRN Temp greater or equal to 38C (100.4F), Mild Pain (1 - 3)  bisacodyl Suppository 10 milliGRAM(s) Rectal daily PRN Constipation  dextrose 40% Gel 15 Gram(s) Oral once PRN Blood Glucose LESS THAN 70 milliGRAM(s)/deciliter  glucagon  Injectable 1 milliGRAM(s) IntraMuscular once PRN Glucose LESS THAN 70 milligrams/deciliter  lactulose Syrup 10 Gram(s) Oral three times a day PRN colnstipaiton      ASSESSMENT / PLAN:  ----------------------------------------  Acute on chronic diastolic heart failure - On carvedilol, isosorbide, and torsemide.    Pleural effusion - Chest tube in place. Monitoring output, total of 2700ml yesterday.    Acute on chronic hypercapnic and hypoxic respiratory failure - On supplemental oxygen.    CAD / PAD - On aspirin, clopidogrel, and atorvastatin.    Diabetes - Insulin coverage, close monitoring of blood glucose levels.    Anemia - On epoetin sara.

## 2018-12-23 NOTE — PHYSICAL THERAPY INITIAL EVALUATION ADULT - DISCHARGE DISPOSITION, PT EVAL
home w home PT vs [facility] pending progress/stairs
home c home PT vs. [facility] (pending PT progress, stair assessment)

## 2018-12-23 NOTE — PHYSICAL THERAPY INITIAL EVALUATION ADULT - PERTINENT HX OF CURRENT PROBLEM, REHAB EVAL
Pt is 62 y/o female with PMHx of CAD s/p CABG x1, Stent placement x2, PAD s/p balloon angioplasty, DM2, HTN, HLD came to the ED with c/o  worsening dyspnea with minimal exertion, increasing abdominal girth and limited ambulation due to SOB.
Acute on chronic diastolic heart failure, SOB. Pt is 62 y/o female with PMHx of CAD s/p CABG x1, Stent placement x2, PAD s/p balloon angioplasty, DM2, HTN, HLD came to the ED with c/o  worsening dyspnea with minimal exertion, increasing abdominal girth and limited ambulation due to SOB.

## 2018-12-23 NOTE — PHYSICAL THERAPY INITIAL EVALUATION ADULT - ADDITIONAL COMMENTS
Pt. lives in a house with her family with 3 DAVID and one rail and 10-15 inside to second story with one rail. Pt. reporting nobody is at home with her at all times, but her family is able to assist her on and off throughout the day. Pt. was modified independent with SAC and required assistance on the stairs PTA. Pt. also owns a SAC, RW and shower chair.
Pt. lives in a house with her family with 3 DAVID and one rail and 10-15 inside to second story with one rail. Pt. reporting nobody is at home with her at all times, but her family is able to assist her on and off throughout the day. Pt. reporting they will not be able to set up 24/7 supervision/assist. Pt. was modified independent with RW and required assistance on the stairs PTA. Pt. also owns a SAC and shower chair.

## 2018-12-24 LAB
GLUCOSE BLDC GLUCOMTR-MCNC: 129 MG/DL — HIGH (ref 70–99)
GLUCOSE BLDC GLUCOMTR-MCNC: 172 MG/DL — HIGH (ref 70–99)
GLUCOSE BLDC GLUCOMTR-MCNC: 207 MG/DL — HIGH (ref 70–99)
GLUCOSE BLDC GLUCOMTR-MCNC: 271 MG/DL — HIGH (ref 70–99)

## 2018-12-24 PROCEDURE — 71045 X-RAY EXAM CHEST 1 VIEW: CPT | Mod: 26,77

## 2018-12-24 PROCEDURE — 99231 SBSQ HOSP IP/OBS SF/LOW 25: CPT

## 2018-12-24 PROCEDURE — 99232 SBSQ HOSP IP/OBS MODERATE 35: CPT

## 2018-12-24 PROCEDURE — 71045 X-RAY EXAM CHEST 1 VIEW: CPT | Mod: 26

## 2018-12-24 RX ADMIN — Medication 81 MILLIGRAM(S): at 11:29

## 2018-12-24 RX ADMIN — ENOXAPARIN SODIUM 40 MILLIGRAM(S): 100 INJECTION SUBCUTANEOUS at 11:29

## 2018-12-24 RX ADMIN — GABAPENTIN 100 MILLIGRAM(S): 400 CAPSULE ORAL at 06:55

## 2018-12-24 RX ADMIN — Medication 3: at 12:55

## 2018-12-24 RX ADMIN — DULOXETINE HYDROCHLORIDE 60 MILLIGRAM(S): 30 CAPSULE, DELAYED RELEASE ORAL at 11:29

## 2018-12-24 RX ADMIN — Medication 3 UNIT(S): at 08:58

## 2018-12-24 RX ADMIN — CARVEDILOL PHOSPHATE 25 MILLIGRAM(S): 80 CAPSULE, EXTENDED RELEASE ORAL at 06:55

## 2018-12-24 RX ADMIN — Medication 3 UNIT(S): at 17:39

## 2018-12-24 RX ADMIN — ATORVASTATIN CALCIUM 40 MILLIGRAM(S): 80 TABLET, FILM COATED ORAL at 21:31

## 2018-12-24 RX ADMIN — Medication 1: at 17:39

## 2018-12-24 RX ADMIN — INSULIN GLARGINE 7 UNIT(S): 100 INJECTION, SOLUTION SUBCUTANEOUS at 21:32

## 2018-12-24 RX ADMIN — PANTOPRAZOLE SODIUM 40 MILLIGRAM(S): 20 TABLET, DELAYED RELEASE ORAL at 06:55

## 2018-12-24 RX ADMIN — GABAPENTIN 100 MILLIGRAM(S): 400 CAPSULE ORAL at 12:55

## 2018-12-24 RX ADMIN — GABAPENTIN 100 MILLIGRAM(S): 400 CAPSULE ORAL at 21:31

## 2018-12-24 RX ADMIN — CARVEDILOL PHOSPHATE 25 MILLIGRAM(S): 80 CAPSULE, EXTENDED RELEASE ORAL at 17:39

## 2018-12-24 RX ADMIN — ISOSORBIDE MONONITRATE 60 MILLIGRAM(S): 60 TABLET, EXTENDED RELEASE ORAL at 11:29

## 2018-12-24 RX ADMIN — Medication 100 MILLIGRAM(S): at 06:55

## 2018-12-24 RX ADMIN — CLOPIDOGREL BISULFATE 75 MILLIGRAM(S): 75 TABLET, FILM COATED ORAL at 11:29

## 2018-12-24 RX ADMIN — Medication 40 MILLIGRAM(S): at 06:55

## 2018-12-24 RX ADMIN — Medication 3 UNIT(S): at 12:54

## 2018-12-24 NOTE — PROGRESS NOTE ADULT - PROBLEM SELECTOR PROBLEM 1
Acute on chronic diastolic congestive heart failure
Hypercapnic respiratory failure
Obesity hypoventilation syndrome
Pleural effusion, right
Syncope and collapse
CAD (coronary artery disease)
Coronary artery disease involving native coronary artery of native heart without angina pectoris
Acute on chronic diastolic congestive heart failure

## 2018-12-24 NOTE — PROGRESS NOTE ADULT - SUBJECTIVE AND OBJECTIVE BOX
Pigtail removed.   Patient stable.   Improved volume status.   Stable blood pressure.   No further cardiac workup.     Consider dc planning.

## 2018-12-24 NOTE — PROGRESS NOTE ADULT - SUBJECTIVE AND OBJECTIVE BOX
CC: follow up GOC  INTERVAL HPI/OVERNIGHT EVENTS:  Ct remains  Patient states ambulating to bathroom  Patient  had been in rehab for 10 months, after toe ampuations earlier this year. Home for 2 months before this hospitalization   PRESENT SYMPTOMS: SOURCE:  Patient/Family/Team    PAIN SCALE:  0 = none  1 = mild   2 = moderate  3 = severe    Pain: denies    Dyspnea:  [ ] YES [x ] NO  Anxiety:  [ ] YES [ x] NO  Fatigue: [x ] YES [ ] NO  Nausea: [ ] YES [ x] NO  Loss of Appetite: [ ] YES [ x] NO  Other symptoms: __________    MEDICATIONS  (STANDING):  aspirin  chewable 81 milliGRAM(s) Oral daily  atorvastatin 40 milliGRAM(s) Oral at bedtime  carvedilol 25 milliGRAM(s) Oral every 12 hours  clopidogrel Tablet 75 milliGRAM(s) Oral daily  dextrose 5%. 1000 milliLiter(s) (50 mL/Hr) IV Continuous <Continuous>  dextrose 50% Injectable 12.5 Gram(s) IV Push once  dextrose 50% Injectable 25 Gram(s) IV Push once  dextrose 50% Injectable 25 Gram(s) IV Push once  docusate sodium 100 milliGRAM(s) Oral two times a day  DULoxetine 60 milliGRAM(s) Oral daily  enoxaparin Injectable 40 milliGRAM(s) SubCutaneous daily  epoetin sara Injectable 74236 Unit(s) SubCutaneous <User Schedule>  gabapentin 100 milliGRAM(s) Oral three times a day  insulin glargine Injectable (LANTUS) 7 Unit(s) SubCutaneous at bedtime  insulin lispro (HumaLOG) corrective regimen sliding scale   SubCutaneous three times a day before meals  insulin lispro Injectable (HumaLOG) 3 Unit(s) SubCutaneous three times a day with meals  isosorbide   mononitrate ER Tablet (IMDUR) 60 milliGRAM(s) Oral daily  pantoprazole    Tablet 40 milliGRAM(s) Oral before breakfast  torsemide 40 milliGRAM(s) Oral daily    MEDICATIONS  (PRN):  acetaminophen   Tablet .. 650 milliGRAM(s) Oral every 6 hours PRN Temp greater or equal to 38C (100.4F), Mild Pain (1 - 3)  bisacodyl Suppository 10 milliGRAM(s) Rectal daily PRN Constipation  dextrose 40% Gel 15 Gram(s) Oral once PRN Blood Glucose LESS THAN 70 milliGRAM(s)/deciliter  glucagon  Injectable 1 milliGRAM(s) IntraMuscular once PRN Glucose LESS THAN 70 milligrams/deciliter  lactulose Syrup 10 Gram(s) Oral three times a day PRN colnstipaiton      Allergies    Accupril (Other)    Intolerances      Karnofsky Performance Score/Palliative Performance Status Version 2:   50     %    Vital Signs Last 24 Hrs  T(C): 36.5 (24 Dec 2018 10:26), Max: 36.8 (24 Dec 2018 06:00)  T(F): 97.7 (24 Dec 2018 10:26), Max: 98.3 (24 Dec 2018 06:00)  HR: 69 (24 Dec 2018 11:26) (67 - 74)  BP: 110/46 (24 Dec 2018 11:26) (107/53 - 126/60)  BP(mean): --  RR: 17 (24 Dec 2018 11:26) (16 - 24)  SpO2: 100% (24 Dec 2018 11:26) (98% - 100%)    PHYSICAL EXAM:    General: AO NAD  HEENT: [x ] normal  [ ] dry mouth  [ ] ET tube/trach    Lungs: [x ] comfortable  R CT    CV: [x ] normal  [ ] tachycardia    GI: [x ] normal  [ ] distended  [ ] tender  [ ] no BS               [ ] PEG/NG/OG tube    : [ x] normal  [ ] incontinent  [ ] oliguria/anuria  [ ] fontenot    MSK: [ x] normal  [ ] weakness  [ ] edema             [ ] ambulatory  [ ] bedbound/wheelchair bound    Skin: [ ] normal  [ ] pressure ulcers- Stage_____  [ x] no rash    LABS:                        8.0    6.1   )-----------( 210      ( 23 Dec 2018 05:45 )             23.6     12-23    141  |  97<L>  |  42.0<H>  ----------------------------<  134<H>  3.8   |  38.0<H>  |  0.92    Ca    8.1<L>      23 Dec 2018 05:45    TPro  6.5<L>  /  Alb  2.4<L>  /  TBili  0.7  /  DBili  x   /  AST  23  /  ALT  17  /  AlkPhos  146<H>  12-23        I&O's Summary    23 Dec 2018 07:01  -  24 Dec 2018 07:00  --------------------------------------------------------  IN: 720 mL / OUT: 1270 mL / NET: -550 mL    24 Dec 2018 07:01  -  24 Dec 2018 11:56  --------------------------------------------------------  IN: 420 mL / OUT: 360 mL / NET: 60 mL        ADVANCE DIRECTIVES:  [ ] YES [ x] NO    DNR: [ ] YES [x ] NO      Date Completed:                    SILVIA [ ] YES [x ] NO   Date Completed:         Thank you for the opportunity to assist with the care of this patient.   [facility] Consult [facility] [phone].

## 2018-12-24 NOTE — PROGRESS NOTE ADULT - SUBJECTIVE AND OBJECTIVE BOX
Subjective: no c/o incisional pain at this time. Denies CP, SOB, palpitations, N/V, other c/o.    T(C): 36.5 (18 @ 10:26), Max: 36.8 (18 @ 06:00)  HR: 69 (18 @ 11:26) (67 - 74)  BP: 110/46 (18 @ 11:26) (107/53 - 126/60)  ABP: --  ABP(mean): --  RR: 17 (18 @ 11:26) (16 - 24)  SpO2: 100% (18 @ 11:26) (98% - 100%)  Wt(kg): --  CVP(mm Hg): --  CO: --  CI: --  PA: --       I&O's Detail    23 Dec 2018 07:01  -  24 Dec 2018 07:00  --------------------------------------------------------  IN:    Oral Fluid: 720 mL  Total IN: 720 mL    OUT:    Chest Tube: 70 mL    Voided: 1200 mL  Total OUT: 1270 mL    Total NET: -550 mL      24 Dec 2018 07:01  -  24 Dec 2018 12:11  --------------------------------------------------------  IN:    Oral Fluid: 420 mL  Total IN: 420 mL    OUT:    Chest Tube: 10 mL    Voided: 350 mL  Total OUT: 360 mL    Total NET: 60 mL          LABS: All Lab data reviewed and analyzed                        8.0    6.1   )-----------( 210      ( 23 Dec 2018 05:45 )             23.6     -    141  |  97<L>  |  42.0<H>  ----------------------------<  134<H>  3.8   |  38.0<H>  |  0.92    Ca    8.1<L>      23 Dec 2018 05:45    TPro  6.5<L>  /  Alb  2.4<L>  /  TBili  0.7  /  DBili  x   /  AST  23  /  ALT  17  /  AlkPhos  146<H>  12-23            CAPILLARY BLOOD GLUCOSE      POCT Blood Glucose.: 129 mg/dL (24 Dec 2018 08:05)  POCT Blood Glucose.: 269 mg/dL (23 Dec 2018 21:43)  POCT Blood Glucose.: 254 mg/dL (23 Dec 2018 17:31)           RADIOLOGY: - Reviewed and analyzed   CXR: pending    HOSPITAL MEDICATIONS: All medications reviewed and analyzed  MEDICATIONS  (STANDING):  aspirin  chewable 81 milliGRAM(s) Oral daily  atorvastatin 40 milliGRAM(s) Oral at bedtime  carvedilol 25 milliGRAM(s) Oral every 12 hours  clopidogrel Tablet 75 milliGRAM(s) Oral daily  dextrose 5%. 1000 milliLiter(s) (50 mL/Hr) IV Continuous <Continuous>  dextrose 50% Injectable 12.5 Gram(s) IV Push once  dextrose 50% Injectable 25 Gram(s) IV Push once  dextrose 50% Injectable 25 Gram(s) IV Push once  docusate sodium 100 milliGRAM(s) Oral two times a day  DULoxetine 60 milliGRAM(s) Oral daily  enoxaparin Injectable 40 milliGRAM(s) SubCutaneous daily  epoetin sara Injectable 24778 Unit(s) SubCutaneous <User Schedule>  gabapentin 100 milliGRAM(s) Oral three times a day  insulin glargine Injectable (LANTUS) 7 Unit(s) SubCutaneous at bedtime  insulin lispro (HumaLOG) corrective regimen sliding scale   SubCutaneous three times a day before meals  insulin lispro Injectable (HumaLOG) 3 Unit(s) SubCutaneous three times a day with meals  isosorbide   mononitrate ER Tablet (IMDUR) 60 milliGRAM(s) Oral daily  pantoprazole    Tablet 40 milliGRAM(s) Oral before breakfast  torsemide 40 milliGRAM(s) Oral daily    MEDICATIONS  (PRN):  acetaminophen   Tablet .. 650 milliGRAM(s) Oral every 6 hours PRN Temp greater or equal to 38C (100.4F), Mild Pain (1 - 3)  bisacodyl Suppository 10 milliGRAM(s) Rectal daily PRN Constipation  dextrose 40% Gel 15 Gram(s) Oral once PRN Blood Glucose LESS THAN 70 milliGRAM(s)/deciliter  glucagon  Injectable 1 milliGRAM(s) IntraMuscular once PRN Glucose LESS THAN 70 milligrams/deciliter  lactulose Syrup 10 Gram(s) Oral three times a day PRN colnstipaiton        Physical Exam  Neuro: A+O x 3, non-focal, speech clear and intact  HEENT:  NCAT, PERRL, EOMI. No conjuctival edema or iIcterus, no thrush.  No ETT or NGT/OGT  Neck:  ROM intact, no JVD, no nodes or masses palpable, trachea midline, no tracheostomy  Pulm: CTA, good air entry, equal bilaterally, no rales/rhonchi/wheezing, no accessory muscle use noted  -previous right pigtail cathter dressing site c/d/i   CV: RRR, S1, S2. No murmurs, rubs, or gallops noted.  Abd: +BSx4. Soft, nontender, nondistended.  : normal genitalia   Ext: GAN x 4, 1+ edmea, no cyanosis or clubbing, distal motor/neuro/circ intact  Skin: warm, dry, no rashes          Case including assessment/plan of care discussed with  thoracic surgery attending.        63yFemale with PMH         PAST MEDICAL & SURGICAL HISTORY:  Herniated disc, cervical  PAD (peripheral artery disease)  Legally blind  History of peripheral vascular disease  History of retinal detachment  History of CEA (carotid endarterectomy): Right  Hyperlipidemia  Glaucoma  Hypertension  Diabetes  S/P CABG x 1  After cataract  Uveitic glaucoma of both eyes  Detached retina  Atherosclerosis of right carotid artery   delivery delivered

## 2018-12-24 NOTE — PROGRESS NOTE ADULT - PROBLEM SELECTOR PROBLEM 4
Hypertension
Hypertension
Pleural effusion
Encounter for palliative care
Hypertension
KEITH (acute kidney injury)
Hypercapnic respiratory failure
Ascites

## 2018-12-24 NOTE — PROGRESS NOTE ADULT - PROBLEM SELECTOR PROBLEM 3
Diabetes
Diabetes
Acute on chronic diastolic congestive heart failure
Hypertension
Acute on chronic diastolic congestive heart failure
Diabetes
Pleural effusion
KEITH (acute kidney injury)
Hyperkalemia

## 2018-12-24 NOTE — PROGRESS NOTE ADULT - ASSESSMENT
64 y/o female with PMHx of CAD s/p CABG x1 Stent placement x2,  PAD s/p balloon angioplasty, DM2, HTN, HLD came to the ED complaining of worsening dyspnea with minimal exertion and increasing abdominal girth over the past 2 weeks. IR was consulted for pleural effusion, underwent right thoracentesis 12/12, 1500cc removed. On 12/17, found unresponsive, code blue was called CPR initiated. Patient regained consciousness. Patient again transfered to ICU. Syncope work up completed.  CT head: non-remarkable. Pt downgraded back to medical floor. 12/20 RRT called for pt unresponsive on the toilet. Patient placed on supplemental O2 via bag mask then placed on bipap and began responding and following command. Thoracic surgery consulted for drainage of right sided pleural effusion seen on AM CXR.       12/20 right pigtail chather placed with 1000cc drained  12/24 right pigtail catheter removed

## 2018-12-24 NOTE — PROGRESS NOTE ADULT - PROBLEM SELECTOR PLAN 4
Continue current regimen.
CT in place  monitor output
Continue current regimen as above.
Continue current regimen as above.
Continue current regimen.
Improved
Patient sleepy during my visit.  Bipap off, tolerating nasal canula.   Patient with long hospitalization with 2 RR .  Plan to discuss GOC when patient less sleepy and can participate in discussion.
s/p bipap stable resp status
Likely secondary to right sided heart failure  consider tap if no improvement to differentiate ? Cardiac cirrhosis vs malignancy

## 2018-12-24 NOTE — PROGRESS NOTE ADULT - PROBLEM SELECTOR PLAN 5
Continue aspirin, Plavix.
Plan for Trilogy at home which  could help with  respiratory issues, given Bipap inadequate. Would not deem patient hospice appropriate at this point.  Discussed advance directives.   She names her  as main surrogate.  For now, she would want interventions as CPR.  Patient comfortable, symptoms managed.   PC team to sign off- please call PRN
Continue aspirin, Plavix.
s/p bipap stable resp status

## 2018-12-24 NOTE — PROGRESS NOTE ADULT - PROBLEM SELECTOR PROBLEM 5
CAD (coronary artery disease)
Pleural effusion, right
Encounter for palliative care
CAD (coronary artery disease)
Hypercapnic respiratory failure

## 2018-12-24 NOTE — PROGRESS NOTE ADULT - SUBJECTIVE AND OBJECTIVE BOX
KUN ROCHA  ----------------------------------------  The patient was seen and evaluated for pleural effusion.  The patient is in no acute distress.  Denied any chest pain, palpitations, dyspnea, or abdominal pain.  Offers no complaints.    Vital Signs Last 24 Hrs  T(C): 36.5 (24 Dec 2018 10:26), Max: 36.8 (24 Dec 2018 06:00)  T(F): 97.7 (24 Dec 2018 10:26), Max: 98.3 (24 Dec 2018 06:00)  HR: 70 (24 Dec 2018 10:26) (67 - 74)  BP: 107/53 (24 Dec 2018 10:26) (107/53 - 126/60)  BP(mean): --  RR: 18 (24 Dec 2018 10:26) (16 - 24)  SpO2: 98% (24 Dec 2018 10:26) (98% - 100%)    CAPILLARY BLOOD GLUCOSE  POCT Blood Glucose.: 129 mg/dL (24 Dec 2018 08:05)  POCT Blood Glucose.: 269 mg/dL (23 Dec 2018 21:43)  POCT Blood Glucose.: 254 mg/dL (23 Dec 2018 17:31)  POCT Blood Glucose.: 172 mg/dL (23 Dec 2018 12:04)    PHYSICAL EXAMINATION:  ----------------------------------------  General appearance: No acute distress, Awake, Alert  HEENT: Normocephalic, Atraumatic, Conjunctiva clear, EOMI  Neck: Supple, No JVD, No tenderness  Lungs: Clear to auscultation, Breath sound equal bilaterally, No wheezes, No rales, Chest tube in place  Cardiovascular: S1S2, Regular rhythm  Abdomen: Soft, Nontender, Nondistended, No guarding/rebound, Positive bowel sounds  Extremities: No clubbing, No cyanosis, No edema, No calf tenderness  Neuro: Strength equal bilaterally, No tremors    LABORATORY STUDIES:  ----------------------------------------             8.0    6.1   )-----------( 210      ( 23 Dec 2018 05:45 )             23.6     12-23    141  |  97<L>  |  42.0<H>  ----------------------------<  134<H>  3.8   |  38.0<H>  |  0.92    Ca    8.1<L>      23 Dec 2018 05:45    TPro  6.5<L>  /  Alb  2.4<L>  /  TBili  0.7  /  DBili  x   /  AST  23  /  ALT  17  /  AlkPhos  146<H>  12-23    LIVER FUNCTIONS - ( 23 Dec 2018 05:45 )  Alb: 2.4 g/dL / Pro: 6.5 g/dL / ALK PHOS: 146 U/L / ALT: 17 U/L / AST: 23 U/L / GGT: x           Culture - Acid Fast - Body Fluid w/Smear (collected 21 Dec 2018 17:38)  Source: .Body Fluid    MEDICATIONS  (STANDING):  aspirin  chewable 81 milliGRAM(s) Oral daily  atorvastatin 40 milliGRAM(s) Oral at bedtime  carvedilol 25 milliGRAM(s) Oral every 12 hours  clopidogrel Tablet 75 milliGRAM(s) Oral daily  dextrose 5%. 1000 milliLiter(s) (50 mL/Hr) IV Continuous <Continuous>  dextrose 50% Injectable 12.5 Gram(s) IV Push once  dextrose 50% Injectable 25 Gram(s) IV Push once  dextrose 50% Injectable 25 Gram(s) IV Push once  docusate sodium 100 milliGRAM(s) Oral two times a day  DULoxetine 60 milliGRAM(s) Oral daily  enoxaparin Injectable 40 milliGRAM(s) SubCutaneous daily  epoetin sara Injectable 12085 Unit(s) SubCutaneous <User Schedule>  gabapentin 100 milliGRAM(s) Oral three times a day  insulin glargine Injectable (LANTUS) 7 Unit(s) SubCutaneous at bedtime  insulin lispro (HumaLOG) corrective regimen sliding scale   SubCutaneous three times a day before meals  insulin lispro Injectable (HumaLOG) 3 Unit(s) SubCutaneous three times a day with meals  isosorbide   mononitrate ER Tablet (IMDUR) 60 milliGRAM(s) Oral daily  pantoprazole    Tablet 40 milliGRAM(s) Oral before breakfast  torsemide 40 milliGRAM(s) Oral daily    MEDICATIONS  (PRN):  acetaminophen   Tablet .. 650 milliGRAM(s) Oral every 6 hours PRN Temp greater or equal to 38C (100.4F), Mild Pain (1 - 3)  bisacodyl Suppository 10 milliGRAM(s) Rectal daily PRN Constipation  dextrose 40% Gel 15 Gram(s) Oral once PRN Blood Glucose LESS THAN 70 milliGRAM(s)/deciliter  glucagon  Injectable 1 milliGRAM(s) IntraMuscular once PRN Glucose LESS THAN 70 milligrams/deciliter  lactulose Syrup 10 Gram(s) Oral three times a day PRN colnstipaiton      ASSESSMENT / PLAN:  ----------------------------------------  Acute on chronic diastolic heart failure - On carvedilol, isosorbide, and torsemide.    Pleural effusion - Chest tube in place. Output improved. Chest Xray noted resolution of the pleural effusion.    Acute on chronic hypercapnic and hypoxic respiratory failure - On supplemental oxygen.    CAD / PAD - On aspirin, clopidogrel, and atorvastatin.    Diabetes - Insulin coverage, close monitoring of blood glucose levels.    Anemia - On epoetin sara.

## 2018-12-24 NOTE — PROGRESS NOTE ADULT - PROBLEM SELECTOR PROBLEM 2
Acute on chronic diastolic congestive heart failure
Coronary artery disease involving native coronary artery of native heart without angina pectoris
Coronary artery disease with other form of angina pectoris
KEITH (acute kidney injury)
Vasovagal syncope
CAD (coronary artery disease)
KEITH (acute kidney injury)
KYREE (obstructive sleep apnea)
KYREE (obstructive sleep apnea)
Acute on chronic diastolic congestive heart failure
Acute on chronic diastolic congestive heart failure
KEITH (acute kidney injury)

## 2018-12-25 LAB
ANION GAP SERPL CALC-SCNC: 8 MMOL/L — SIGNIFICANT CHANGE UP (ref 5–17)
BUN SERPL-MCNC: 49 MG/DL — HIGH (ref 8–20)
CALCIUM SERPL-MCNC: 8.2 MG/DL — LOW (ref 8.6–10.2)
CHLORIDE SERPL-SCNC: 97 MMOL/L — LOW (ref 98–107)
CO2 SERPL-SCNC: 33 MMOL/L — HIGH (ref 22–29)
CREAT SERPL-MCNC: 1.22 MG/DL — SIGNIFICANT CHANGE UP (ref 0.5–1.3)
CULTURE RESULTS: SIGNIFICANT CHANGE UP
GLUCOSE BLDC GLUCOMTR-MCNC: 135 MG/DL — HIGH (ref 70–99)
GLUCOSE BLDC GLUCOMTR-MCNC: 208 MG/DL — HIGH (ref 70–99)
GLUCOSE BLDC GLUCOMTR-MCNC: 245 MG/DL — HIGH (ref 70–99)
GLUCOSE BLDC GLUCOMTR-MCNC: 266 MG/DL — HIGH (ref 70–99)
GLUCOSE SERPL-MCNC: 145 MG/DL — HIGH (ref 70–115)
HCT VFR BLD CALC: 22.5 % — LOW (ref 37–47)
HGB BLD-MCNC: 7.7 G/DL — LOW (ref 12–16)
MCHC RBC-ENTMCNC: 27.8 PG — SIGNIFICANT CHANGE UP (ref 27–31)
MCHC RBC-ENTMCNC: 34.2 G/DL — SIGNIFICANT CHANGE UP (ref 32–36)
MCV RBC AUTO: 81.2 FL — SIGNIFICANT CHANGE UP (ref 81–99)
PLATELET # BLD AUTO: 210 K/UL — SIGNIFICANT CHANGE UP (ref 150–400)
POTASSIUM SERPL-MCNC: 4.3 MMOL/L — SIGNIFICANT CHANGE UP (ref 3.5–5.3)
POTASSIUM SERPL-SCNC: 4.3 MMOL/L — SIGNIFICANT CHANGE UP (ref 3.5–5.3)
RBC # BLD: 2.77 M/UL — LOW (ref 4.4–5.2)
RBC # FLD: 20 % — HIGH (ref 11–15.6)
SODIUM SERPL-SCNC: 138 MMOL/L — SIGNIFICANT CHANGE UP (ref 135–145)
SPECIMEN SOURCE: SIGNIFICANT CHANGE UP
WBC # BLD: 5.7 K/UL — SIGNIFICANT CHANGE UP (ref 4.8–10.8)
WBC # FLD AUTO: 5.7 K/UL — SIGNIFICANT CHANGE UP (ref 4.8–10.8)

## 2018-12-25 PROCEDURE — 99232 SBSQ HOSP IP/OBS MODERATE 35: CPT

## 2018-12-25 RX ADMIN — GABAPENTIN 100 MILLIGRAM(S): 400 CAPSULE ORAL at 05:33

## 2018-12-25 RX ADMIN — CLOPIDOGREL BISULFATE 75 MILLIGRAM(S): 75 TABLET, FILM COATED ORAL at 12:41

## 2018-12-25 RX ADMIN — ENOXAPARIN SODIUM 40 MILLIGRAM(S): 100 INJECTION SUBCUTANEOUS at 22:31

## 2018-12-25 RX ADMIN — Medication 650 MILLIGRAM(S): at 22:29

## 2018-12-25 RX ADMIN — CARVEDILOL PHOSPHATE 25 MILLIGRAM(S): 80 CAPSULE, EXTENDED RELEASE ORAL at 18:30

## 2018-12-25 RX ADMIN — Medication 40 MILLIGRAM(S): at 05:32

## 2018-12-25 RX ADMIN — INSULIN GLARGINE 7 UNIT(S): 100 INJECTION, SOLUTION SUBCUTANEOUS at 22:32

## 2018-12-25 RX ADMIN — Medication 650 MILLIGRAM(S): at 23:15

## 2018-12-25 RX ADMIN — Medication 81 MILLIGRAM(S): at 12:40

## 2018-12-25 RX ADMIN — CARVEDILOL PHOSPHATE 25 MILLIGRAM(S): 80 CAPSULE, EXTENDED RELEASE ORAL at 05:33

## 2018-12-25 RX ADMIN — ATORVASTATIN CALCIUM 40 MILLIGRAM(S): 80 TABLET, FILM COATED ORAL at 22:29

## 2018-12-25 RX ADMIN — Medication 3 UNIT(S): at 17:39

## 2018-12-25 RX ADMIN — PANTOPRAZOLE SODIUM 40 MILLIGRAM(S): 20 TABLET, DELAYED RELEASE ORAL at 05:33

## 2018-12-25 RX ADMIN — Medication 3: at 12:41

## 2018-12-25 RX ADMIN — Medication 3 UNIT(S): at 08:00

## 2018-12-25 RX ADMIN — Medication 3 UNIT(S): at 12:41

## 2018-12-25 RX ADMIN — ISOSORBIDE MONONITRATE 60 MILLIGRAM(S): 60 TABLET, EXTENDED RELEASE ORAL at 12:40

## 2018-12-25 RX ADMIN — GABAPENTIN 100 MILLIGRAM(S): 400 CAPSULE ORAL at 12:40

## 2018-12-25 RX ADMIN — Medication 2: at 17:39

## 2018-12-25 RX ADMIN — DULOXETINE HYDROCHLORIDE 60 MILLIGRAM(S): 30 CAPSULE, DELAYED RELEASE ORAL at 12:40

## 2018-12-25 RX ADMIN — GABAPENTIN 100 MILLIGRAM(S): 400 CAPSULE ORAL at 22:29

## 2018-12-25 NOTE — PROGRESS NOTE ADULT - ASSESSMENT
KUN PERESDLECLAAR  ----------------------------------------  The patient was seen and evaluated for heart failure.  The patient is in no acute distress.  Denied any chest pain, palpitations, dyspnea, or abdominal pain.  Feels well.    Vital Signs Last 24 Hrs  T(C): 36.7 (25 Dec 2018 09:56), Max: 36.9 (24 Dec 2018 21:25)  T(F): 98.1 (25 Dec 2018 09:56), Max: 98.4 (24 Dec 2018 21:25)  HR: 67 (25 Dec 2018 09:56) (67 - 73)  BP: 112/49 (25 Dec 2018 09:56) (112/49 - 132/60)  BP(mean): --  RR: 20 (25 Dec 2018 09:56) (17 - 20)  SpO2: 100% (25 Dec 2018 09:56) (97% - 100%)    CAPILLARY BLOOD GLUCOSE  POCT Blood Glucose.: 266 mg/dL (25 Dec 2018 12:10)  POCT Blood Glucose.: 135 mg/dL (25 Dec 2018 08:25)  POCT Blood Glucose.: 207 mg/dL (24 Dec 2018 21:08)  POCT Blood Glucose.: 172 mg/dL (24 Dec 2018 17:14)    PHYSICAL EXAMINATION:  ----------------------------------------  General appearance: No acute distress, Awake, Alert  HEENT: Normocephalic, Atraumatic, Conjunctiva clear, EOMI  Neck: Supple, No JVD, No tenderness  Lungs: Clear to auscultation, Breath sound equal bilaterally, No wheezes, No rales  Cardiovascular: S1S2, Regular rhythm  Abdomen: Soft, Nontender, Nondistended, No guarding/rebound, Positive bowel sounds  Extremities: No clubbing, No cyanosis, No edema, No calf tenderness  Neuro: Strength equal bilaterally, No tremors    LABORATORY STUDIES:  ----------------------------------------             7.7    5.7   )-----------( 210      ( 25 Dec 2018 05:46 )             22.5     12-25    138  |  97<L>  |  49.0<H>  ----------------------------<  145<H>  4.3   |  33.0<H>  |  1.22    Ca    8.2<L>      25 Dec 2018 05:46    MEDICATIONS  (STANDING):  aspirin  chewable 81 milliGRAM(s) Oral daily  atorvastatin 40 milliGRAM(s) Oral at bedtime  carvedilol 25 milliGRAM(s) Oral every 12 hours  clopidogrel Tablet 75 milliGRAM(s) Oral daily  dextrose 5%. 1000 milliLiter(s) (50 mL/Hr) IV Continuous <Continuous>  dextrose 50% Injectable 12.5 Gram(s) IV Push once  dextrose 50% Injectable 25 Gram(s) IV Push once  dextrose 50% Injectable 25 Gram(s) IV Push once  docusate sodium 100 milliGRAM(s) Oral two times a day  DULoxetine 60 milliGRAM(s) Oral daily  enoxaparin Injectable 40 milliGRAM(s) SubCutaneous daily  epoetin sara Injectable 58515 Unit(s) SubCutaneous <User Schedule>  gabapentin 100 milliGRAM(s) Oral three times a day  insulin glargine Injectable (LANTUS) 7 Unit(s) SubCutaneous at bedtime  insulin lispro (HumaLOG) corrective regimen sliding scale   SubCutaneous three times a day before meals  insulin lispro Injectable (HumaLOG) 3 Unit(s) SubCutaneous three times a day with meals  isosorbide   mononitrate ER Tablet (IMDUR) 60 milliGRAM(s) Oral daily  pantoprazole    Tablet 40 milliGRAM(s) Oral before breakfast  torsemide 40 milliGRAM(s) Oral daily    MEDICATIONS  (PRN):  acetaminophen   Tablet .. 650 milliGRAM(s) Oral every 6 hours PRN Temp greater or equal to 38C (100.4F), Mild Pain (1 - 3)  bisacodyl Suppository 10 milliGRAM(s) Rectal daily PRN Constipation  dextrose 40% Gel 15 Gram(s) Oral once PRN Blood Glucose LESS THAN 70 milliGRAM(s)/deciliter  glucagon  Injectable 1 milliGRAM(s) IntraMuscular once PRN Glucose LESS THAN 70 milligrams/deciliter  lactulose Syrup 10 Gram(s) Oral three times a day PRN colnstipaiton      ASSESSMENT / PLAN:  ----------------------------------------  Acute on chronic diastolic heart failure - On carvedilol, isosorbide, and torsemide. Dyspnea improved.    Pleural effusion - Chest Xray noted resolution of the pleural effusion and the chest tube was removed yesterday.    Acute on chronic hypercapnic and hypoxic respiratory failure - On supplemental oxygen.    CAD / PAD - On aspirin, clopidogrel, and atorvastatin.    Diabetes - Insulin coverage, close monitoring of blood glucose levels.    Anemia - On epoetin sara. CBC to be repeated. On iron supplementation.

## 2018-12-26 VITALS
RESPIRATION RATE: 20 BRPM | TEMPERATURE: 98 F | SYSTOLIC BLOOD PRESSURE: 130 MMHG | OXYGEN SATURATION: 98 % | DIASTOLIC BLOOD PRESSURE: 60 MMHG | HEART RATE: 70 BPM

## 2018-12-26 LAB
GLUCOSE BLDC GLUCOMTR-MCNC: 144 MG/DL — HIGH (ref 70–99)
GLUCOSE BLDC GLUCOMTR-MCNC: 253 MG/DL — HIGH (ref 70–99)
HCT VFR BLD CALC: 24 % — LOW (ref 37–47)
HGB BLD-MCNC: 8.1 G/DL — LOW (ref 12–16)
MCHC RBC-ENTMCNC: 27.7 PG — SIGNIFICANT CHANGE UP (ref 27–31)
MCHC RBC-ENTMCNC: 33.8 G/DL — SIGNIFICANT CHANGE UP (ref 32–36)
MCV RBC AUTO: 82.2 FL — SIGNIFICANT CHANGE UP (ref 81–99)
PLATELET # BLD AUTO: 237 K/UL — SIGNIFICANT CHANGE UP (ref 150–400)
RBC # BLD: 2.92 M/UL — LOW (ref 4.4–5.2)
RBC # FLD: 20.3 % — HIGH (ref 11–15.6)
WBC # BLD: 6.8 K/UL — SIGNIFICANT CHANGE UP (ref 4.8–10.8)
WBC # FLD AUTO: 6.8 K/UL — SIGNIFICANT CHANGE UP (ref 4.8–10.8)

## 2018-12-26 PROCEDURE — 85014 HEMATOCRIT: CPT

## 2018-12-26 PROCEDURE — 84157 ASSAY OF PROTEIN OTHER: CPT

## 2018-12-26 PROCEDURE — 81001 URINALYSIS AUTO W/SCOPE: CPT

## 2018-12-26 PROCEDURE — 83880 ASSAY OF NATRIURETIC PEPTIDE: CPT

## 2018-12-26 PROCEDURE — 82803 BLOOD GASES ANY COMBINATION: CPT

## 2018-12-26 PROCEDURE — A9500: CPT

## 2018-12-26 PROCEDURE — 93971 EXTREMITY STUDY: CPT

## 2018-12-26 PROCEDURE — 97110 THERAPEUTIC EXERCISES: CPT

## 2018-12-26 PROCEDURE — 95819 EEG AWAKE AND ASLEEP: CPT

## 2018-12-26 PROCEDURE — 84466 ASSAY OF TRANSFERRIN: CPT

## 2018-12-26 PROCEDURE — 85027 COMPLETE CBC AUTOMATED: CPT

## 2018-12-26 PROCEDURE — 80061 LIPID PANEL: CPT

## 2018-12-26 PROCEDURE — 87075 CULTR BACTERIA EXCEPT BLOOD: CPT

## 2018-12-26 PROCEDURE — 71046 X-RAY EXAM CHEST 2 VIEWS: CPT

## 2018-12-26 PROCEDURE — C1729: CPT

## 2018-12-26 PROCEDURE — 82435 ASSAY OF BLOOD CHLORIDE: CPT

## 2018-12-26 PROCEDURE — 84100 ASSAY OF PHOSPHORUS: CPT

## 2018-12-26 PROCEDURE — 94640 AIRWAY INHALATION TREATMENT: CPT

## 2018-12-26 PROCEDURE — 99239 HOSP IP/OBS DSCHRG MGMT >30: CPT

## 2018-12-26 PROCEDURE — C1894: CPT

## 2018-12-26 PROCEDURE — 93306 TTE W/DOPPLER COMPLETE: CPT

## 2018-12-26 PROCEDURE — 76775 US EXAM ABDO BACK WALL LIM: CPT

## 2018-12-26 PROCEDURE — 83986 ASSAY PH BODY FLUID NOS: CPT

## 2018-12-26 PROCEDURE — 87206 SMEAR FLUORESCENT/ACID STAI: CPT

## 2018-12-26 PROCEDURE — 86850 RBC ANTIBODY SCREEN: CPT

## 2018-12-26 PROCEDURE — 93451 RIGHT HEART CATH: CPT

## 2018-12-26 PROCEDURE — 93017 CV STRESS TEST TRACING ONLY: CPT

## 2018-12-26 PROCEDURE — 99285 EMERGENCY DEPT VISIT HI MDM: CPT | Mod: 25

## 2018-12-26 PROCEDURE — 82330 ASSAY OF CALCIUM: CPT

## 2018-12-26 PROCEDURE — 97530 THERAPEUTIC ACTIVITIES: CPT

## 2018-12-26 PROCEDURE — 85610 PROTHROMBIN TIME: CPT

## 2018-12-26 PROCEDURE — 71045 X-RAY EXAM CHEST 1 VIEW: CPT

## 2018-12-26 PROCEDURE — 84484 ASSAY OF TROPONIN QUANT: CPT

## 2018-12-26 PROCEDURE — 83605 ASSAY OF LACTIC ACID: CPT

## 2018-12-26 PROCEDURE — 36415 COLL VENOUS BLD VENIPUNCTURE: CPT

## 2018-12-26 PROCEDURE — 83036 HEMOGLOBIN GLYCOSYLATED A1C: CPT

## 2018-12-26 PROCEDURE — 97163 PT EVAL HIGH COMPLEX 45 MIN: CPT

## 2018-12-26 PROCEDURE — 85730 THROMBOPLASTIN TIME PARTIAL: CPT

## 2018-12-26 PROCEDURE — 74176 CT ABD & PELVIS W/O CONTRAST: CPT

## 2018-12-26 PROCEDURE — 82040 ASSAY OF SERUM ALBUMIN: CPT

## 2018-12-26 PROCEDURE — 82550 ASSAY OF CK (CPK): CPT

## 2018-12-26 PROCEDURE — 86900 BLOOD TYPING SEROLOGIC ABO: CPT

## 2018-12-26 PROCEDURE — 82962 GLUCOSE BLOOD TEST: CPT

## 2018-12-26 PROCEDURE — 86334 IMMUNOFIX E-PHORESIS SERUM: CPT

## 2018-12-26 PROCEDURE — 82947 ASSAY GLUCOSE BLOOD QUANT: CPT

## 2018-12-26 PROCEDURE — 84295 ASSAY OF SERUM SODIUM: CPT

## 2018-12-26 PROCEDURE — 88341 IMHCHEM/IMCYTCHM EA ADD ANTB: CPT

## 2018-12-26 PROCEDURE — 90686 IIV4 VACC NO PRSV 0.5 ML IM: CPT

## 2018-12-26 PROCEDURE — 88112 CYTOPATH CELL ENHANCE TECH: CPT

## 2018-12-26 PROCEDURE — 83735 ASSAY OF MAGNESIUM: CPT

## 2018-12-26 PROCEDURE — 88342 IMHCHEM/IMCYTCHM 1ST ANTB: CPT

## 2018-12-26 PROCEDURE — 76942 ECHO GUIDE FOR BIOPSY: CPT

## 2018-12-26 PROCEDURE — 84132 ASSAY OF SERUM POTASSIUM: CPT

## 2018-12-26 PROCEDURE — 87070 CULTURE OTHR SPECIMN AEROBIC: CPT

## 2018-12-26 PROCEDURE — C1889: CPT

## 2018-12-26 PROCEDURE — 74177 CT ABD & PELVIS W/CONTRAST: CPT

## 2018-12-26 PROCEDURE — 96375 TX/PRO/DX INJ NEW DRUG ADDON: CPT

## 2018-12-26 PROCEDURE — 87015 SPECIMEN INFECT AGNT CONCNTJ: CPT

## 2018-12-26 PROCEDURE — 80053 COMPREHEN METABOLIC PANEL: CPT

## 2018-12-26 PROCEDURE — 87205 SMEAR GRAM STAIN: CPT

## 2018-12-26 PROCEDURE — 83615 LACTATE (LD) (LDH) ENZYME: CPT

## 2018-12-26 PROCEDURE — 76937 US GUIDE VASCULAR ACCESS: CPT

## 2018-12-26 PROCEDURE — 96374 THER/PROPH/DIAG INJ IV PUSH: CPT

## 2018-12-26 PROCEDURE — 97116 GAIT TRAINING THERAPY: CPT

## 2018-12-26 PROCEDURE — 86022 PLATELET ANTIBODIES: CPT

## 2018-12-26 PROCEDURE — 80048 BASIC METABOLIC PNL TOTAL CA: CPT

## 2018-12-26 PROCEDURE — 83550 IRON BINDING TEST: CPT

## 2018-12-26 PROCEDURE — 94660 CPAP INITIATION&MGMT: CPT

## 2018-12-26 PROCEDURE — 94760 N-INVAS EAR/PLS OXIMETRY 1: CPT

## 2018-12-26 PROCEDURE — 71260 CT THORAX DX C+: CPT

## 2018-12-26 PROCEDURE — 93970 EXTREMITY STUDY: CPT

## 2018-12-26 PROCEDURE — 87116 MYCOBACTERIA CULTURE: CPT

## 2018-12-26 PROCEDURE — 86901 BLOOD TYPING SEROLOGIC RH(D): CPT

## 2018-12-26 PROCEDURE — 93005 ELECTROCARDIOGRAM TRACING: CPT

## 2018-12-26 PROCEDURE — 70450 CT HEAD/BRAIN W/O DYE: CPT

## 2018-12-26 PROCEDURE — 78452 HT MUSCLE IMAGE SPECT MULT: CPT

## 2018-12-26 PROCEDURE — 82728 ASSAY OF FERRITIN: CPT

## 2018-12-26 PROCEDURE — 36600 WITHDRAWAL OF ARTERIAL BLOOD: CPT

## 2018-12-26 PROCEDURE — 88305 TISSUE EXAM BY PATHOLOGIST: CPT

## 2018-12-26 PROCEDURE — 83540 ASSAY OF IRON: CPT

## 2018-12-26 PROCEDURE — 82784 ASSAY IGA/IGD/IGG/IGM EACH: CPT

## 2018-12-26 PROCEDURE — 89051 BODY FLUID CELL COUNT: CPT

## 2018-12-26 RX ORDER — ASPIRIN/CALCIUM CARB/MAGNESIUM 324 MG
1 TABLET ORAL
Qty: 0 | Refills: 0 | COMMUNITY
Start: 2018-12-26

## 2018-12-26 RX ADMIN — ISOSORBIDE MONONITRATE 60 MILLIGRAM(S): 60 TABLET, EXTENDED RELEASE ORAL at 11:37

## 2018-12-26 RX ADMIN — Medication 3 UNIT(S): at 12:36

## 2018-12-26 RX ADMIN — Medication 3 UNIT(S): at 08:48

## 2018-12-26 RX ADMIN — GABAPENTIN 100 MILLIGRAM(S): 400 CAPSULE ORAL at 05:22

## 2018-12-26 RX ADMIN — Medication 3: at 12:36

## 2018-12-26 RX ADMIN — CARVEDILOL PHOSPHATE 25 MILLIGRAM(S): 80 CAPSULE, EXTENDED RELEASE ORAL at 05:22

## 2018-12-26 RX ADMIN — GABAPENTIN 100 MILLIGRAM(S): 400 CAPSULE ORAL at 13:25

## 2018-12-26 RX ADMIN — Medication 40 MILLIGRAM(S): at 05:23

## 2018-12-26 RX ADMIN — DULOXETINE HYDROCHLORIDE 60 MILLIGRAM(S): 30 CAPSULE, DELAYED RELEASE ORAL at 11:38

## 2018-12-26 RX ADMIN — Medication 81 MILLIGRAM(S): at 11:37

## 2018-12-26 RX ADMIN — CLOPIDOGREL BISULFATE 75 MILLIGRAM(S): 75 TABLET, FILM COATED ORAL at 11:38

## 2018-12-26 RX ADMIN — PANTOPRAZOLE SODIUM 40 MILLIGRAM(S): 20 TABLET, DELAYED RELEASE ORAL at 05:23

## 2018-12-26 NOTE — PROGRESS NOTE ADULT - SUBJECTIVE AND OBJECTIVE BOX
KUN ROCHA  ----------------------------------------  The patient was seen and evaluated for heart failure.  The patient is in no acute distress.  Denied any chest pain, palpitations, dyspnea, or abdominal pain.  Reports feeling well.    Vital Signs Last 24 Hrs  T(C): 36.5 (26 Dec 2018 10:08), Max: 36.8 (25 Dec 2018 15:33)  T(F): 97.7 (26 Dec 2018 10:08), Max: 98.3 (25 Dec 2018 15:33)  HR: 69 (26 Dec 2018 10:08) (68 - 72)  BP: 123/53 (26 Dec 2018 10:08) (119/55 - 128/58)  BP(mean): --  RR: 18 (26 Dec 2018 10:08) (17 - 19)  SpO2: 98% (26 Dec 2018 10:08) (94% - 100%)    CAPILLARY BLOOD GLUCOSE  POCT Blood Glucose.: 144 mg/dL (26 Dec 2018 08:15)  POCT Blood Glucose.: 208 mg/dL (25 Dec 2018 22:00)  POCT Blood Glucose.: 245 mg/dL (25 Dec 2018 17:26)  POCT Blood Glucose.: 266 mg/dL (25 Dec 2018 12:10)    PHYSICAL EXAMINATION:  ----------------------------------------  General appearance: No acute distress, Awake, Alert  HEENT: Normocephalic, Atraumatic, Conjunctiva clear, EOMI  Neck: Supple, No JVD, No tenderness  Lungs: Clear to auscultation, Breath sound equal bilaterally, No wheezes, No rales  Cardiovascular: S1S2, Regular rhythm  Abdomen: Soft, Nontender, Nondistended, No guarding/rebound, Positive bowel sounds  Extremities: No clubbing, No cyanosis, No edema, No calf tenderness  Neuro: Strength equal bilaterally, No tremors    LABORATORY STUDIES:  ----------------------------------------             8.1    6.8   )-----------( 237      ( 26 Dec 2018 05:55 )             24.0     12-25    138  |  97<L>  |  49.0<H>  ----------------------------<  145<H>  4.3   |  33.0<H>  |  1.22    Ca    8.2<L>      25 Dec 2018 05:46    MEDICATIONS  (STANDING):  aspirin  chewable 81 milliGRAM(s) Oral daily  atorvastatin 40 milliGRAM(s) Oral at bedtime  carvedilol 25 milliGRAM(s) Oral every 12 hours  clopidogrel Tablet 75 milliGRAM(s) Oral daily  dextrose 5%. 1000 milliLiter(s) (50 mL/Hr) IV Continuous <Continuous>  dextrose 50% Injectable 12.5 Gram(s) IV Push once  dextrose 50% Injectable 25 Gram(s) IV Push once  dextrose 50% Injectable 25 Gram(s) IV Push once  docusate sodium 100 milliGRAM(s) Oral two times a day  DULoxetine 60 milliGRAM(s) Oral daily  enoxaparin Injectable 40 milliGRAM(s) SubCutaneous daily  epoetin sara Injectable 51705 Unit(s) SubCutaneous <User Schedule>  gabapentin 100 milliGRAM(s) Oral three times a day  insulin glargine Injectable (LANTUS) 7 Unit(s) SubCutaneous at bedtime  insulin lispro (HumaLOG) corrective regimen sliding scale   SubCutaneous three times a day before meals  insulin lispro Injectable (HumaLOG) 3 Unit(s) SubCutaneous three times a day with meals  isosorbide   mononitrate ER Tablet (IMDUR) 60 milliGRAM(s) Oral daily  pantoprazole    Tablet 40 milliGRAM(s) Oral before breakfast  torsemide 40 milliGRAM(s) Oral daily    MEDICATIONS  (PRN):  acetaminophen   Tablet .. 650 milliGRAM(s) Oral every 6 hours PRN Temp greater or equal to 38C (100.4F), Mild Pain (1 - 3)  bisacodyl Suppository 10 milliGRAM(s) Rectal daily PRN Constipation  dextrose 40% Gel 15 Gram(s) Oral once PRN Blood Glucose LESS THAN 70 milliGRAM(s)/deciliter  glucagon  Injectable 1 milliGRAM(s) IntraMuscular once PRN Glucose LESS THAN 70 milligrams/deciliter  lactulose Syrup 10 Gram(s) Oral three times a day PRN colnstipaiton      ASSESSMENT / PLAN:  ----------------------------------------  Acute on chronic diastolic heart failure - No dyspnea on examination. On carvedilol, isosorbide, and torsemide.    Pleural effusion - Chest Xray noted resolution of the pleural effusion and the chest tube was removed two days ago.    Acute on chronic hypercapnic and hypoxic respiratory failure - On supplemental oxygen.    CAD / PAD - On aspirin, clopidogrel, and atorvastatin.    Diabetes - Insulin coverage, close monitoring of blood glucose levels.    Anemia - On epoetin sara. Improved on repeat CBC.    The possibility of pursuing transfer to a rehabilitation facility was discussed with the patient, but she requested that she return home upon discharge from the hospital.

## 2018-12-26 NOTE — CHART NOTE - NSCHARTNOTEFT_GEN_A_CORE
Source: Patient [x ]  Family [ ]   other [ ]    Current Diet: Diet, Consistent Carbohydrate/No Snacks:   DASH/TLC {Sodium & Cholesteral Restricted} (11-27-18 @ 11:35)    PO intake:  < 50% [ ]   50-75%  [ ]   %  [x ]  other :    Source for PO intake [x ] Patient [ ] family [ ] chart [ ] staff [ ] other    Current Weight:   (12/24) 180#  (12/16) 213lbs   (12/11) 231lbs  (12/8)  240lbs  (12/2)  215lbs      % Weight Change: 33# wt change in 8 days, unsure of accuracy of wt's will continue to monitor trends      Pertinent Medications: MEDICATIONS  (STANDING):  aspirin  chewable 81 milliGRAM(s) Oral daily  atorvastatin 40 milliGRAM(s) Oral at bedtime  carvedilol 25 milliGRAM(s) Oral every 12 hours  clopidogrel Tablet 75 milliGRAM(s) Oral daily  dextrose 5%. 1000 milliLiter(s) (50 mL/Hr) IV Continuous <Continuous>  dextrose 50% Injectable 12.5 Gram(s) IV Push once  dextrose 50% Injectable 25 Gram(s) IV Push once  dextrose 50% Injectable 25 Gram(s) IV Push once  docusate sodium 100 milliGRAM(s) Oral two times a day  DULoxetine 60 milliGRAM(s) Oral daily  enoxaparin Injectable 40 milliGRAM(s) SubCutaneous daily  epoetin sara Injectable 51265 Unit(s) SubCutaneous <User Schedule>  gabapentin 100 milliGRAM(s) Oral three times a day  insulin glargine Injectable (LANTUS) 7 Unit(s) SubCutaneous at bedtime  insulin lispro (HumaLOG) corrective regimen sliding scale   SubCutaneous three times a day before meals  insulin lispro Injectable (HumaLOG) 3 Unit(s) SubCutaneous three times a day with meals  isosorbide   mononitrate ER Tablet (IMDUR) 60 milliGRAM(s) Oral daily  pantoprazole    Tablet 40 milliGRAM(s) Oral before breakfast  torsemide 40 milliGRAM(s) Oral daily    MEDICATIONS  (PRN):  acetaminophen   Tablet .. 650 milliGRAM(s) Oral every 6 hours PRN Temp greater or equal to 38C (100.4F), Mild Pain (1 - 3)  bisacodyl Suppository 10 milliGRAM(s) Rectal daily PRN Constipation  dextrose 40% Gel 15 Gram(s) Oral once PRN Blood Glucose LESS THAN 70 milliGRAM(s)/deciliter  glucagon  Injectable 1 milliGRAM(s) IntraMuscular once PRN Glucose LESS THAN 70 milligrams/deciliter  lactulose Syrup 10 Gram(s) Oral three times a day PRN colnstipaiton    Pertinent Labs: CBC Full  -  ( 26 Dec 2018 05:55 )  WBC Count : 6.8 K/uL  Hemoglobin : 8.1 g/dL  Hematocrit : 24.0 %  Platelet Count - Automated : 237 K/uL  Mean Cell Volume : 82.2 fl  Mean Cell Hemoglobin : 27.7 pg  Mean Cell Hemoglobin Concentration : 33.8 g/dL  Auto Neutrophil # : x  Auto Lymphocyte # : x  Auto Monocyte # : x  Auto Eosinophil # : x  Auto Basophil # : x  Auto Neutrophil % : x  Auto Lymphocyte % : x  Auto Monocyte % : x  Auto Eosinophil % : x  Auto Basophil % : x        Skin: R heel unstageable     Nutrition focused physical exam conducted - found signs of malnutrition [x ]absent [ ]present    Subcutaneous fat loss: [ ] Orbital fat pads region, [ ]Buccal fat region, [ ]Triceps region,  [ ]Ribs region    Muscle wasting: [ ]Temples region, [ ]Clavicle region, [ ]Shoulder region, [ ]Scapula region, [ ]Interosseous region,  [ ]thigh region, [ ]Calf region    Estimated Needs:   [x ] no change since previous assessment  [ ] recalculated:     Current Nutrition Diagnosis   Pt remains at nutrition risk secondary to altered nutrition related lab values related to DM, KEITH, pulmonary HTN, +Pleural effusion, s/p chest tube as evidenced by low H/H, elevated glucose levels, elevated BUN. Pt reports taking and tolerating diet well, eating % at meals. Recommendations below:     Recommendations:   1. Rx: MVI and Vit. C daily (500mg)   2. Check wt daily to monitor trends  3. Continue with diet Rx   4. monitor renal labs       Monitoring and Evaluation:   [x ] PO intake [ x] Tolerance to diet prescription [X] Weights  [X] Follow up per protocol [X] Labs:

## 2018-12-26 NOTE — PROGRESS NOTE ADULT - ASSESSMENT
CRF: CHF (EF= 70%; mild pulm HTN)==> diastolic dysfunction  Renal sono unremarkable  CHF now compensated  - continue diuretics to keep azotemic; track metabolic alkalosis  - daily weights  - follow labs    Anemia: s/p Venofer  - cont QUENTIN  - trend labs; target Hgb=10.0

## 2018-12-26 NOTE — PROGRESS NOTE ADULT - REASON FOR ADMISSION

## 2018-12-26 NOTE — PROGRESS NOTE ADULT - PROVIDER SPECIALTY LIST ADULT
Cardiology
Hospitalist
Internal Medicine
Intervent Radiology
Intervent Radiology
Nephrology
Palliative Care
Palliative Care
Pulmonology
Thoracic Surgery
Cardiology
Hospitalist
Internal Medicine
Nephrology
Hospitalist
Hospitalist

## 2018-12-26 NOTE — PROGRESS NOTE ADULT - SUBJECTIVE AND OBJECTIVE BOX
NEPHROLOGY INTERVAL HPI/OVERNIGHT EVENTS:  pt comfortable  no acute distress noted    MEDICATIONS  (STANDING):  aspirin  chewable 81 milliGRAM(s) Oral daily  atorvastatin 40 milliGRAM(s) Oral at bedtime  carvedilol 25 milliGRAM(s) Oral every 12 hours  clopidogrel Tablet 75 milliGRAM(s) Oral daily  dextrose 5%. 1000 milliLiter(s) (50 mL/Hr) IV Continuous <Continuous>  dextrose 50% Injectable 12.5 Gram(s) IV Push once  dextrose 50% Injectable 25 Gram(s) IV Push once  dextrose 50% Injectable 25 Gram(s) IV Push once  docusate sodium 100 milliGRAM(s) Oral two times a day  DULoxetine 60 milliGRAM(s) Oral daily  enoxaparin Injectable 40 milliGRAM(s) SubCutaneous daily  epoetin sara Injectable 35941 Unit(s) SubCutaneous <User Schedule>  gabapentin 100 milliGRAM(s) Oral three times a day  insulin glargine Injectable (LANTUS) 7 Unit(s) SubCutaneous at bedtime  insulin lispro (HumaLOG) corrective regimen sliding scale   SubCutaneous three times a day before meals  insulin lispro Injectable (HumaLOG) 3 Unit(s) SubCutaneous three times a day with meals  isosorbide   mononitrate ER Tablet (IMDUR) 60 milliGRAM(s) Oral daily  pantoprazole    Tablet 40 milliGRAM(s) Oral before breakfast  torsemide 40 milliGRAM(s) Oral daily    MEDICATIONS  (PRN):  acetaminophen   Tablet .. 650 milliGRAM(s) Oral every 6 hours PRN Temp greater or equal to 38C (100.4F), Mild Pain (1 - 3)  bisacodyl Suppository 10 milliGRAM(s) Rectal daily PRN Constipation  dextrose 40% Gel 15 Gram(s) Oral once PRN Blood Glucose LESS THAN 70 milliGRAM(s)/deciliter  glucagon  Injectable 1 milliGRAM(s) IntraMuscular once PRN Glucose LESS THAN 70 milligrams/deciliter  lactulose Syrup 10 Gram(s) Oral three times a day PRN colnstipaiton      Allergies    Accupril (Other)    Intolerances      Vital Signs Last 24 Hrs  T(C): 36.5 (26 Dec 2018 10:08), Max: 36.8 (25 Dec 2018 15:33)  T(F): 97.7 (26 Dec 2018 10:08), Max: 98.3 (25 Dec 2018 15:33)  HR: 70 (26 Dec 2018 11:36) (68 - 72)  BP: 124/54 (26 Dec 2018 11:36) (119/55 - 128/58)  BP(mean): --  RR: 18 (26 Dec 2018 11:36) (17 - 19)  SpO2: 98% (26 Dec 2018 11:36) (94% - 100%)    PHYSICAL EXAM:  GENERAL: NAD  HEENT: opacified R eye  NECK: Supple, +JVD  NERVOUS SYSTEM:  Alert & Oriented X3  CHEST/LUNG: Diminished bilateral BS  HEART: Regular rate and rhythm; no rub  ABDOMEN: Soft, Nontender,+ distention; +BS  EXTREMITIES:  + LE edema improved    LABS:                        8.1    6.8   )-----------( 237      ( 26 Dec 2018 05:55 )             24.0     12-25    138  |  97<L>  |  49.0<H>  ----------------------------<  145<H>  4.3   |  33.0<H>  |  1.22    Ca    8.2<L>      25 Dec 2018 05:46              RADIOLOGY & ADDITIONAL TESTS:

## 2018-12-31 LAB — NON-GYNECOLOGICAL CYTOLOGY STUDY: SIGNIFICANT CHANGE UP

## 2019-01-13 ENCOUNTER — INPATIENT (INPATIENT)
Facility: HOSPITAL | Age: 64
LOS: 4 days | Discharge: HOME | DRG: 378 | End: 2019-01-18
Attending: STUDENT IN AN ORGANIZED HEALTH CARE EDUCATION/TRAINING PROGRAM | Admitting: STUDENT IN AN ORGANIZED HEALTH CARE EDUCATION/TRAINING PROGRAM
Payer: MEDICARE

## 2019-01-13 ENCOUNTER — TRANSCRIPTION ENCOUNTER (OUTPATIENT)
Age: 64
End: 2019-01-13

## 2019-01-13 VITALS
OXYGEN SATURATION: 100 % | RESPIRATION RATE: 18 BRPM | WEIGHT: 179.9 LBS | HEART RATE: 70 BPM | HEIGHT: 65 IN | SYSTOLIC BLOOD PRESSURE: 115 MMHG | TEMPERATURE: 98 F | DIASTOLIC BLOOD PRESSURE: 76 MMHG

## 2019-01-13 DIAGNOSIS — K92.2 GASTROINTESTINAL HEMORRHAGE, UNSPECIFIED: ICD-10-CM

## 2019-01-13 DIAGNOSIS — K62.5 HEMORRHAGE OF ANUS AND RECTUM: ICD-10-CM

## 2019-01-13 LAB
ALBUMIN SERPL ELPH-MCNC: 3.1 G/DL — LOW (ref 3.3–5.2)
ALP SERPL-CCNC: 85 U/L — SIGNIFICANT CHANGE UP (ref 40–120)
ALT FLD-CCNC: 20 U/L — SIGNIFICANT CHANGE UP
ANION GAP SERPL CALC-SCNC: 11 MMOL/L — SIGNIFICANT CHANGE UP (ref 5–17)
ANION GAP SERPL CALC-SCNC: 12 MMOL/L — SIGNIFICANT CHANGE UP (ref 5–17)
ANION GAP SERPL CALC-SCNC: 12 MMOL/L — SIGNIFICANT CHANGE UP (ref 5–17)
ANION GAP SERPL CALC-SCNC: 14 MMOL/L — SIGNIFICANT CHANGE UP (ref 5–17)
ANION GAP SERPL CALC-SCNC: 9 MMOL/L — SIGNIFICANT CHANGE UP (ref 5–17)
ANISOCYTOSIS BLD QL: SIGNIFICANT CHANGE UP
AST SERPL-CCNC: 29 U/L — SIGNIFICANT CHANGE UP
BASE EXCESS BLDA CALC-SCNC: -1.4 MMOL/L — SIGNIFICANT CHANGE UP (ref -2–2)
BASOPHILS # BLD AUTO: 0 K/UL — SIGNIFICANT CHANGE UP (ref 0–0.2)
BASOPHILS # BLD AUTO: 0 K/UL — SIGNIFICANT CHANGE UP (ref 0–0.2)
BASOPHILS NFR BLD AUTO: 0.1 % — SIGNIFICANT CHANGE UP (ref 0–2)
BASOPHILS NFR BLD AUTO: 0.3 % — SIGNIFICANT CHANGE UP (ref 0–2)
BILIRUB SERPL-MCNC: 0.6 MG/DL — SIGNIFICANT CHANGE UP (ref 0.4–2)
BLOOD GAS COMMENTS ARTERIAL: SIGNIFICANT CHANGE UP
BUN SERPL-MCNC: 52 MG/DL — HIGH (ref 8–20)
BUN SERPL-MCNC: 53 MG/DL — HIGH (ref 8–20)
BUN SERPL-MCNC: 54 MG/DL — HIGH (ref 8–20)
BUN SERPL-MCNC: 55 MG/DL — HIGH (ref 8–20)
BUN SERPL-MCNC: 56 MG/DL — HIGH (ref 8–20)
CALCIUM SERPL-MCNC: 7.7 MG/DL — LOW (ref 8.6–10.2)
CALCIUM SERPL-MCNC: 7.9 MG/DL — LOW (ref 8.6–10.2)
CALCIUM SERPL-MCNC: 8.1 MG/DL — LOW (ref 8.6–10.2)
CHLORIDE SERPL-SCNC: 101 MMOL/L — SIGNIFICANT CHANGE UP (ref 98–107)
CHLORIDE SERPL-SCNC: 102 MMOL/L — SIGNIFICANT CHANGE UP (ref 98–107)
CHLORIDE SERPL-SCNC: 106 MMOL/L — SIGNIFICANT CHANGE UP (ref 98–107)
CO2 SERPL-SCNC: 19 MMOL/L — LOW (ref 22–29)
CO2 SERPL-SCNC: 20 MMOL/L — LOW (ref 22–29)
CO2 SERPL-SCNC: 22 MMOL/L — SIGNIFICANT CHANGE UP (ref 22–29)
CO2 SERPL-SCNC: 23 MMOL/L — SIGNIFICANT CHANGE UP (ref 22–29)
CO2 SERPL-SCNC: 25 MMOL/L — SIGNIFICANT CHANGE UP (ref 22–29)
CREAT SERPL-MCNC: 1.15 MG/DL — SIGNIFICANT CHANGE UP (ref 0.5–1.3)
CREAT SERPL-MCNC: 1.23 MG/DL — SIGNIFICANT CHANGE UP (ref 0.5–1.3)
CREAT SERPL-MCNC: 1.27 MG/DL — SIGNIFICANT CHANGE UP (ref 0.5–1.3)
CREAT SERPL-MCNC: 1.27 MG/DL — SIGNIFICANT CHANGE UP (ref 0.5–1.3)
CREAT SERPL-MCNC: 1.32 MG/DL — HIGH (ref 0.5–1.3)
ELLIPTOCYTES BLD QL SMEAR: SLIGHT — SIGNIFICANT CHANGE UP
EOSINOPHIL # BLD AUTO: 0 K/UL — SIGNIFICANT CHANGE UP (ref 0–0.5)
EOSINOPHIL # BLD AUTO: 0 K/UL — SIGNIFICANT CHANGE UP (ref 0–0.5)
EOSINOPHIL NFR BLD AUTO: 0.1 % — SIGNIFICANT CHANGE UP (ref 0–6)
EOSINOPHIL NFR BLD AUTO: 0.2 % — SIGNIFICANT CHANGE UP (ref 0–6)
GAS PNL BLDA: SIGNIFICANT CHANGE UP
GLUCOSE SERPL-MCNC: 192 MG/DL — HIGH (ref 70–115)
GLUCOSE SERPL-MCNC: 197 MG/DL — HIGH (ref 70–115)
GLUCOSE SERPL-MCNC: 207 MG/DL — HIGH (ref 70–115)
GLUCOSE SERPL-MCNC: 216 MG/DL — HIGH (ref 70–115)
GLUCOSE SERPL-MCNC: 223 MG/DL — HIGH (ref 70–115)
HCO3 BLDA-SCNC: 23 MMOL/L — SIGNIFICANT CHANGE UP (ref 20–26)
HCT VFR BLD CALC: 18 % — CRITICAL LOW (ref 37–47)
HCT VFR BLD CALC: 19.2 % — CRITICAL LOW (ref 37–47)
HGB BLD-MCNC: 6.4 G/DL — CRITICAL LOW (ref 12–16)
HGB BLD-MCNC: 6.8 G/DL — CRITICAL LOW (ref 12–16)
HOROWITZ INDEX BLDA+IHG-RTO: SIGNIFICANT CHANGE UP
HYPOCHROMIA BLD QL: SLIGHT — SIGNIFICANT CHANGE UP
INR BLD: 1.54 RATIO — HIGH (ref 0.88–1.16)
LYMPHOCYTES # BLD AUTO: 1.4 K/UL — SIGNIFICANT CHANGE UP (ref 1–4.8)
LYMPHOCYTES # BLD AUTO: 1.6 K/UL — SIGNIFICANT CHANGE UP (ref 1–4.8)
LYMPHOCYTES # BLD AUTO: 20.3 % — SIGNIFICANT CHANGE UP (ref 20–55)
LYMPHOCYTES # BLD AUTO: 22 % — SIGNIFICANT CHANGE UP (ref 20–55)
MACROCYTES BLD QL: SLIGHT — SIGNIFICANT CHANGE UP
MAGNESIUM SERPL-MCNC: 2.2 MG/DL — SIGNIFICANT CHANGE UP (ref 1.6–2.6)
MCHC RBC-ENTMCNC: 29.8 PG — SIGNIFICANT CHANGE UP (ref 27–31)
MCHC RBC-ENTMCNC: 30 PG — SIGNIFICANT CHANGE UP (ref 27–31)
MCHC RBC-ENTMCNC: 35.4 G/DL — SIGNIFICANT CHANGE UP (ref 32–36)
MCHC RBC-ENTMCNC: 35.6 G/DL — SIGNIFICANT CHANGE UP (ref 32–36)
MCV RBC AUTO: 84.2 FL — SIGNIFICANT CHANGE UP (ref 81–99)
MCV RBC AUTO: 84.5 FL — SIGNIFICANT CHANGE UP (ref 81–99)
MICROCYTES BLD QL: SLIGHT — SIGNIFICANT CHANGE UP
MONOCYTES # BLD AUTO: 0.5 K/UL — SIGNIFICANT CHANGE UP (ref 0–0.8)
MONOCYTES # BLD AUTO: 0.5 K/UL — SIGNIFICANT CHANGE UP (ref 0–0.8)
MONOCYTES NFR BLD AUTO: 5.8 % — SIGNIFICANT CHANGE UP (ref 3–10)
MONOCYTES NFR BLD AUTO: 7.3 % — SIGNIFICANT CHANGE UP (ref 3–10)
NEUTROPHILS # BLD AUTO: 4.6 K/UL — SIGNIFICANT CHANGE UP (ref 1.8–8)
NEUTROPHILS # BLD AUTO: 5.9 K/UL — SIGNIFICANT CHANGE UP (ref 1.8–8)
NEUTROPHILS NFR BLD AUTO: 70 % — SIGNIFICANT CHANGE UP (ref 37–73)
NEUTROPHILS NFR BLD AUTO: 73.6 % — HIGH (ref 37–73)
PCO2 BLDA: 37 MMHG — SIGNIFICANT CHANGE UP (ref 35–45)
PH BLDA: 7.41 — SIGNIFICANT CHANGE UP (ref 7.35–7.45)
PHOSPHATE SERPL-MCNC: 4.7 MG/DL — SIGNIFICANT CHANGE UP (ref 2.4–4.7)
PLAT MORPH BLD: NORMAL — SIGNIFICANT CHANGE UP
PLATELET # BLD AUTO: 104 K/UL — LOW (ref 150–400)
PLATELET # BLD AUTO: 116 K/UL — LOW (ref 150–400)
PO2 BLDA: 67 MMHG — LOW (ref 83–108)
POIKILOCYTOSIS BLD QL AUTO: SLIGHT — SIGNIFICANT CHANGE UP
POTASSIUM SERPL-MCNC: 5.1 MMOL/L — SIGNIFICANT CHANGE UP (ref 3.5–5.3)
POTASSIUM SERPL-MCNC: 5.6 MMOL/L — HIGH (ref 3.5–5.3)
POTASSIUM SERPL-MCNC: 5.6 MMOL/L — HIGH (ref 3.5–5.3)
POTASSIUM SERPL-MCNC: 6 MMOL/L — HIGH (ref 3.5–5.3)
POTASSIUM SERPL-MCNC: 7.2 MMOL/L — CRITICAL HIGH (ref 3.5–5.3)
POTASSIUM SERPL-SCNC: 5.1 MMOL/L — SIGNIFICANT CHANGE UP (ref 3.5–5.3)
POTASSIUM SERPL-SCNC: 5.6 MMOL/L — HIGH (ref 3.5–5.3)
POTASSIUM SERPL-SCNC: 5.6 MMOL/L — HIGH (ref 3.5–5.3)
POTASSIUM SERPL-SCNC: 6 MMOL/L — HIGH (ref 3.5–5.3)
POTASSIUM SERPL-SCNC: 7.2 MMOL/L — CRITICAL HIGH (ref 3.5–5.3)
PROT SERPL-MCNC: 6.1 G/DL — LOW (ref 6.6–8.7)
PROTHROM AB SERPL-ACNC: 18 SEC — HIGH (ref 10–12.9)
RBC # BLD: 2.13 M/UL — LOW (ref 4.4–5.2)
RBC # BLD: 2.28 M/UL — LOW (ref 4.4–5.2)
RBC # FLD: 16.3 % — HIGH (ref 11–15.6)
RBC # FLD: 17 % — HIGH (ref 11–15.6)
RBC BLD AUTO: ABNORMAL
SAO2 % BLDA: 95 % — SIGNIFICANT CHANGE UP (ref 95–99)
SODIUM SERPL-SCNC: 133 MMOL/L — LOW (ref 135–145)
SODIUM SERPL-SCNC: 135 MMOL/L — SIGNIFICANT CHANGE UP (ref 135–145)
SODIUM SERPL-SCNC: 136 MMOL/L — SIGNIFICANT CHANGE UP (ref 135–145)
SODIUM SERPL-SCNC: 136 MMOL/L — SIGNIFICANT CHANGE UP (ref 135–145)
SODIUM SERPL-SCNC: 140 MMOL/L — SIGNIFICANT CHANGE UP (ref 135–145)
TARGETS BLD QL SMEAR: SIGNIFICANT CHANGE UP
WBC # BLD: 6.6 K/UL — SIGNIFICANT CHANGE UP (ref 4.8–10.8)
WBC # BLD: 8 K/UL — SIGNIFICANT CHANGE UP (ref 4.8–10.8)
WBC # FLD AUTO: 6.6 K/UL — SIGNIFICANT CHANGE UP (ref 4.8–10.8)
WBC # FLD AUTO: 8 K/UL — SIGNIFICANT CHANGE UP (ref 4.8–10.8)

## 2019-01-13 PROCEDURE — 75774 ARTERY X-RAY EACH VESSEL: CPT | Mod: 26,76

## 2019-01-13 PROCEDURE — 71045 X-RAY EXAM CHEST 1 VIEW: CPT | Mod: 26

## 2019-01-13 PROCEDURE — 93010 ELECTROCARDIOGRAM REPORT: CPT

## 2019-01-13 PROCEDURE — 36248 INS CATH ABD/L-EXT ART ADDL: CPT | Mod: 59

## 2019-01-13 PROCEDURE — 71045 X-RAY EXAM CHEST 1 VIEW: CPT | Mod: 26,77

## 2019-01-13 PROCEDURE — 76937 US GUIDE VASCULAR ACCESS: CPT | Mod: 26

## 2019-01-13 PROCEDURE — 99223 1ST HOSP IP/OBS HIGH 75: CPT

## 2019-01-13 PROCEDURE — 36246 INS CATH ABD/L-EXT ART 2ND: CPT

## 2019-01-13 PROCEDURE — 74174 CTA ABD&PLVS W/CONTRAST: CPT | Mod: 26

## 2019-01-13 PROCEDURE — 75726 ARTERY X-RAYS ABDOMEN: CPT | Mod: 26

## 2019-01-13 PROCEDURE — 99291 CRITICAL CARE FIRST HOUR: CPT

## 2019-01-13 RX ORDER — GABAPENTIN 400 MG/1
100 CAPSULE ORAL THREE TIMES A DAY
Qty: 0 | Refills: 0 | Status: DISCONTINUED | OUTPATIENT
Start: 2019-01-13 | End: 2019-01-15

## 2019-01-13 RX ORDER — SODIUM BICARBONATE 1 MEQ/ML
50 SYRINGE (ML) INTRAVENOUS ONCE
Qty: 0 | Refills: 0 | Status: COMPLETED | OUTPATIENT
Start: 2019-01-13 | End: 2019-01-13

## 2019-01-13 RX ORDER — DEXTROSE 50 % IN WATER 50 %
25 SYRINGE (ML) INTRAVENOUS ONCE
Qty: 0 | Refills: 0 | Status: DISCONTINUED | OUTPATIENT
Start: 2019-01-13 | End: 2019-01-13

## 2019-01-13 RX ORDER — DEXTROSE 50 % IN WATER 50 %
50 SYRINGE (ML) INTRAVENOUS ONCE
Qty: 0 | Refills: 0 | Status: COMPLETED | OUTPATIENT
Start: 2019-01-13 | End: 2019-01-13

## 2019-01-13 RX ORDER — SODIUM CHLORIDE 9 MG/ML
1000 INJECTION, SOLUTION INTRAVENOUS
Qty: 0 | Refills: 0 | Status: DISCONTINUED | OUTPATIENT
Start: 2019-01-13 | End: 2019-01-16

## 2019-01-13 RX ORDER — ACETAMINOPHEN 500 MG
1000 TABLET ORAL ONCE
Qty: 0 | Refills: 0 | Status: COMPLETED | OUTPATIENT
Start: 2019-01-13 | End: 2019-01-14

## 2019-01-13 RX ORDER — DEXTROSE 50 % IN WATER 50 %
25 SYRINGE (ML) INTRAVENOUS ONCE
Qty: 0 | Refills: 0 | Status: DISCONTINUED | OUTPATIENT
Start: 2019-01-13 | End: 2019-01-15

## 2019-01-13 RX ORDER — GLUCAGON INJECTION, SOLUTION 0.5 MG/.1ML
1 INJECTION, SOLUTION SUBCUTANEOUS ONCE
Qty: 0 | Refills: 0 | Status: DISCONTINUED | OUTPATIENT
Start: 2019-01-13 | End: 2019-01-15

## 2019-01-13 RX ORDER — INSULIN LISPRO 100/ML
VIAL (ML) SUBCUTANEOUS
Qty: 0 | Refills: 0 | Status: DISCONTINUED | OUTPATIENT
Start: 2019-01-13 | End: 2019-01-14

## 2019-01-13 RX ORDER — INSULIN HUMAN 100 [IU]/ML
10 INJECTION, SOLUTION SUBCUTANEOUS ONCE
Qty: 0 | Refills: 0 | Status: COMPLETED | OUTPATIENT
Start: 2019-01-13 | End: 2019-01-13

## 2019-01-13 RX ORDER — PANTOPRAZOLE SODIUM 20 MG/1
40 TABLET, DELAYED RELEASE ORAL
Qty: 0 | Refills: 0 | Status: DISCONTINUED | OUTPATIENT
Start: 2019-01-13 | End: 2019-01-18

## 2019-01-13 RX ORDER — SODIUM CHLORIDE 9 MG/ML
1000 INJECTION, SOLUTION INTRAVENOUS
Qty: 0 | Refills: 0 | Status: DISCONTINUED | OUTPATIENT
Start: 2019-01-13 | End: 2019-01-15

## 2019-01-13 RX ORDER — MORPHINE SULFATE 50 MG/1
2 CAPSULE, EXTENDED RELEASE ORAL ONCE
Qty: 0 | Refills: 0 | Status: DISCONTINUED | OUTPATIENT
Start: 2019-01-13 | End: 2019-01-13

## 2019-01-13 RX ORDER — DULOXETINE HYDROCHLORIDE 30 MG/1
60 CAPSULE, DELAYED RELEASE ORAL DAILY
Qty: 0 | Refills: 0 | Status: DISCONTINUED | OUTPATIENT
Start: 2019-01-13 | End: 2019-01-15

## 2019-01-13 RX ORDER — DEXTROSE 50 % IN WATER 50 %
50 SYRINGE (ML) INTRAVENOUS ONCE
Qty: 0 | Refills: 0 | Status: DISCONTINUED | OUTPATIENT
Start: 2019-01-13 | End: 2019-01-13

## 2019-01-13 RX ORDER — FUROSEMIDE 40 MG
60 TABLET ORAL ONCE
Qty: 0 | Refills: 0 | Status: COMPLETED | OUTPATIENT
Start: 2019-01-13 | End: 2019-01-13

## 2019-01-13 RX ORDER — SOD SULF/SODIUM/NAHCO3/KCL/PEG
4000 SOLUTION, RECONSTITUTED, ORAL ORAL ONCE
Qty: 0 | Refills: 0 | Status: COMPLETED | OUTPATIENT
Start: 2019-01-13 | End: 2019-01-14

## 2019-01-13 RX ORDER — CHLORHEXIDINE GLUCONATE 213 G/1000ML
1 SOLUTION TOPICAL DAILY
Qty: 0 | Refills: 0 | Status: DISCONTINUED | OUTPATIENT
Start: 2019-01-13 | End: 2019-01-18

## 2019-01-13 RX ORDER — INSULIN LISPRO 100/ML
10 VIAL (ML) SUBCUTANEOUS ONCE
Qty: 0 | Refills: 0 | Status: DISCONTINUED | OUTPATIENT
Start: 2019-01-13 | End: 2019-01-13

## 2019-01-13 RX ORDER — CALCIUM GLUCONATE 100 MG/ML
1 VIAL (ML) INTRAVENOUS ONCE
Qty: 0 | Refills: 0 | Status: COMPLETED | OUTPATIENT
Start: 2019-01-13 | End: 2019-01-13

## 2019-01-13 RX ORDER — SODIUM POLYSTYRENE SULFONATE 4.1 MEQ/G
30 POWDER, FOR SUSPENSION ORAL ONCE
Qty: 0 | Refills: 0 | Status: COMPLETED | OUTPATIENT
Start: 2019-01-13 | End: 2019-01-13

## 2019-01-13 RX ORDER — DEXTROSE 50 % IN WATER 50 %
12.5 SYRINGE (ML) INTRAVENOUS ONCE
Qty: 0 | Refills: 0 | Status: DISCONTINUED | OUTPATIENT
Start: 2019-01-13 | End: 2019-01-15

## 2019-01-13 RX ORDER — CARVEDILOL PHOSPHATE 80 MG/1
25 CAPSULE, EXTENDED RELEASE ORAL EVERY 12 HOURS
Qty: 0 | Refills: 0 | Status: DISCONTINUED | OUTPATIENT
Start: 2019-01-13 | End: 2019-01-18

## 2019-01-13 RX ORDER — SODIUM CHLORIDE 9 MG/ML
1000 INJECTION INTRAMUSCULAR; INTRAVENOUS; SUBCUTANEOUS
Qty: 0 | Refills: 0 | Status: DISCONTINUED | OUTPATIENT
Start: 2019-01-13 | End: 2019-01-13

## 2019-01-13 RX ORDER — INSULIN HUMAN 100 [IU]/ML
6 INJECTION, SOLUTION SUBCUTANEOUS ONCE
Qty: 0 | Refills: 0 | Status: COMPLETED | OUTPATIENT
Start: 2019-01-13 | End: 2019-01-13

## 2019-01-13 RX ORDER — DEXTROSE 50 % IN WATER 50 %
15 SYRINGE (ML) INTRAVENOUS ONCE
Qty: 0 | Refills: 0 | Status: DISCONTINUED | OUTPATIENT
Start: 2019-01-13 | End: 2019-01-15

## 2019-01-13 RX ORDER — INSULIN HUMAN 100 [IU]/ML
6 INJECTION, SOLUTION SUBCUTANEOUS ONCE
Qty: 0 | Refills: 0 | Status: DISCONTINUED | OUTPATIENT
Start: 2019-01-13 | End: 2019-01-13

## 2019-01-13 RX ADMIN — INSULIN HUMAN 10 UNIT(S): 100 INJECTION, SOLUTION SUBCUTANEOUS at 16:42

## 2019-01-13 RX ADMIN — MORPHINE SULFATE 2 MILLIGRAM(S): 50 CAPSULE, EXTENDED RELEASE ORAL at 12:53

## 2019-01-13 RX ADMIN — Medication 50 MILLILITER(S): at 13:36

## 2019-01-13 RX ADMIN — INSULIN HUMAN 6 UNIT(S): 100 INJECTION, SOLUTION SUBCUTANEOUS at 13:37

## 2019-01-13 RX ADMIN — Medication 200 GRAM(S): at 13:37

## 2019-01-13 RX ADMIN — Medication 50 MILLILITER(S): at 16:44

## 2019-01-13 RX ADMIN — Medication 50 MILLIEQUIVALENT(S): at 13:36

## 2019-01-13 RX ADMIN — Medication 60 MILLIGRAM(S): at 15:05

## 2019-01-13 RX ADMIN — SODIUM CHLORIDE 60 MILLILITER(S): 9 INJECTION, SOLUTION INTRAVENOUS at 22:50

## 2019-01-13 RX ADMIN — SODIUM POLYSTYRENE SULFONATE 30 GRAM(S): 4.1 POWDER, FOR SUSPENSION ORAL at 13:52

## 2019-01-13 NOTE — ED PROVIDER NOTE - PHYSICAL EXAMINATION
Constitutional - well-developed; well nourished. Head - NCAT. Airway patent. Eyes - L eye reactive.  R eye unreactive CV - RRR. no murmur. no edema. Pulm - CTAB. Abd - soft, nt. no rebound. no guarding. Neuro - A&Ox3. strength 5/5 x4. sensation intact x4. normal gait. Skin - No rash. MSK - normal ROM.

## 2019-01-13 NOTE — ED PROVIDER NOTE - CARE PLAN
Principal Discharge DX:	Gastrointestinal hemorrhage, unspecified gastrointestinal hemorrhage type  Secondary Diagnosis:	Hyperkalemia

## 2019-01-13 NOTE — PROCEDURE NOTE - NSPROCDETAILS_GEN_ALL_CORE
sterile technique, catheter placed/guidewire recovered/lumen(s) aspirated and flushed/sterile dressing applied/ultrasound guidance
positive blood return obtained via catheter/sutured in place/location identified, draped/prepped, sterile technique used, needle inserted/introduced/connected to a pressurized flush line/all materials/supplies accounted for at end of procedure/Seldinger technique

## 2019-01-13 NOTE — ED ADULT NURSE REASSESSMENT NOTE - NS ED NURSE REASSESS COMMENT FT1
2nd unit of PRBCs completed w/o incident. Pt tolerated well. ICU TAMANNA Kim at bedside inserting triple lumen.

## 2019-01-13 NOTE — PROCEDURE NOTE - NSINDICATIONS_GEN_A_CORE
venous access/critical illness/volume resuscitation/emergency venous access
monitoring purposes/arterial puncture to obtain ABG's/critical patient

## 2019-01-13 NOTE — ED ADULT NURSE REASSESSMENT NOTE - NS ED NURSE REASSESS COMMENT FT1
Assuming care from previous RN, pt A&O x's 4, resp even and unlabored, color good, showing NSR on monitor, Rosa NOLEN at bedside placing radial arterial line in left wrist, pt with right IJ in place receiving third unit of PRBC, pt offers no complaints, denies pain, pt placed on transport cardiac monitor and taken to IR, report called to Silverio SZYMANSKI in SICU

## 2019-01-13 NOTE — CONSULT NOTE ADULT - PROBLEM SELECTOR RECOMMENDATION 9
- + bleed in transverse on CTA  - needs  3 units PRBC  - check post transfusion hemoglobin. WIll need lasix between each unit as pt has hx of CHF.   - will need cardiac and pulmonary clearance for colonoscopy  - needs correction of K  - check ABG- is lethargic- hx of hypercapnea  - Will order MOVI prep. If pt does not drink it all before midnight, she will need NGT placement and prep to be given via NGT  - Surgery has been called and pt is going to be evaluated for ICU  - vital signs are currently stable . IF pt becomes unstable, then please call IR for embolization

## 2019-01-13 NOTE — ED ADULT NURSE REASSESSMENT NOTE - NS ED NURSE REASSESS COMMENT FT1
Dr Lilli Rosenthal GI at bedside, rectal exam performed pt to have colonoscopy/endoscopy tomorrow. Pt made aware.

## 2019-01-13 NOTE — ED PROVIDER NOTE - OBJECTIVE STATEMENT
Pt is a 62 yo F BIBEMS for rectal bleeding.  PMHx significant for htn, hld, cad, chf, dm.  Pt was recenty discharged from the hospital and was doing well. Yesterday patient started to have multiple episodes of black stool.  Pt states that the stool is loose.  no n/v no fever/chills. no abd pain. no cp. no lightheadedness. no modifying factors.  episodes are intermittent and unchanged.

## 2019-01-13 NOTE — ED ADULT NURSE REASSESSMENT NOTE - NS ED NURSE REASSESS COMMENT FT1
First unit of PRBCs transfused w/o incident. Pt tolerated well. 2nd unit on the way from blood bank.

## 2019-01-13 NOTE — ED ADULT TRIAGE NOTE - CHIEF COMPLAINT QUOTE
Pt bought to ED by ambulance from home for c/o bright red blood per rectum x3 days. Pt denies abd pain, denies n/v. Abd tender to palpation of left abd. Pt on Plavix and ASA

## 2019-01-13 NOTE — ED PROVIDER NOTE - NS ED ROS FT
No fever/chills, No photophobia/eye pain/changes in vision, No ear pain/sore throat/dysphagia, No chest pain/palpitations, no SOB/cough/wheeze/stridor, No abdominal pain, No N/V/D, no dysuria/frequency/discharge, No neck/back pain, no rash, no changes in neurological status/function.    +rectal bleeding

## 2019-01-13 NOTE — ED PROVIDER NOTE - MEDICAL DECISION MAKING DETAILS
labs and imaging reviewed. Pt presented with lower GI bleed Sx on plavix and ASA. Hgb came back at 5.0.  3 Units PRBCs ordered immediately. Pt also found to be hyperkalemic to 7.2 with no significant EKG changes and normal renal function. HyperK cocktail ordered and renal consulted.  Dr. Rosenthal to see patient from GI. Surgical team notified for admission and will admit to SICU. IR consulted and will plan to embolize if potassium is below 6.0.  [facility] consulted.

## 2019-01-13 NOTE — H&P ADULT - HISTORY OF PRESENT ILLNESS
ACUTE CARE SURGERY SICU H&P    HPI: 63y Female present to ED with 3 days abdominal pain and bloody red BM. States never had this before. Patient denies fevers/chills, denies lightheadedness/dizziness, denies SOB/chest pain, denies nausea/vomiting, denies constipation/diarrhea. Hungry.    ROS:   General: denies fatigue, unintended weight loss or night sweats  Neuro: denies focal weakness, numbness or tingling  CV: denies chest pain, chest pressure or palpitations  Resp: denies shortness of breath, pleuritic pain, cough, or wheezing  GI: denies changes in bowel movement, melena or BRBPR  : denies dysuria, hematuria, urinary frequency or urinary urgency  MSK: denies myalgias      PAST MEDICAL & SURGICAL HISTORY:  Herniated disc, cervical  PAD (peripheral artery disease)  Legally blind  History of peripheral vascular disease  History of retinal detachment  History of CEA (carotid endarterectomy): Right  Hyperlipidemia  Glaucoma  Hypertension  Diabetes  S/P CABG x 1  After cataract  Uveitic glaucoma of both eyes  Detached retina  Atherosclerosis of right carotid artery   delivery delivered    FAMILY HISTORY:  Family history of diabetes mellitus  Family history of essential hypertension    [x] Family history not pertinent as reviewed with the patient and family    SOCIAL HISTORY:  denies toxic habits    ALLERGIES: NKA Accupril (Other)    Home Medications:  aspirin 81 mg oral tablet, chewable: 1 tab(s) orally once a day (26 Dec 2018 11:22)  atorvastatin 80 mg oral tablet: 1 tab(s) orally once a day (at bedtime) (26 Dec 2018 11:18)  carvedilol 25 mg oral tablet: 1 tab(s) orally every 12 hours (26 Dec 2018 11:18)  clopidogrel 75 mg oral tablet: 1 tab(s) orally once a day (26 Dec 2018 11:18)  docusate sodium 100 mg oral capsule: 1 cap(s) orally 2 times a day (26 Dec 2018 11:18)  DULoxetine 60 mg oral delayed release capsule: 1 cap(s) orally once a day (26 Dec 2018 11:18)  gabapentin 100 mg oral capsule: 1 cap(s) orally 3 times a day (26 Dec 2018 11:18)  isosorbide mononitrate 60 mg oral tablet, extended release: 1 tab(s) orally once a day (in the morning) (26 Dec 2018 11:18)  magnesium oxide 400 mg (240 mg elemental magnesium) oral tablet: 1 tab(s) orally once a day (26 Dec 2018 11:18)  metFORMIN 850 mg oral tablet: orally 2 times a day (with meals) (26 Dec 2018 11:18)  pantoprazole 40 mg oral delayed release tablet: 1 tab(s) orally once a day (before a meal) (26 Dec 2018 11:18)      --------------------------------------------------------------------------------------------    PHYSICAL EXAM:  General: NAD, lethargic  Neuro: A+Ox2  HEENT: NC/AT, EOMI  Neck: Soft, supple  Cardio: RRR, nml S1/S2  Resp: Good effort, CTA b/l  GI/Abd: Soft, NT/ND, no rebound/guarding, no masses palpated  Vascular: All 4 extremities warm.  Skin: Intact, no breakdown  --------------------------------------------------------------------------------------------    LABS                 6.8    6.6    )----------(  104       ( 2019 22:53 )               19.2      140    |  106    |  56.0   ----------------------------<  223        ( 2019 22:53 )  5.1     |  25.0   |  1.15     Ca    8.1        ( 2019 22:53 )  Phos  4.7       ( 2019 22:53 )  Mg     2.2       ( 2019 22:53 )    TPro  6.1    /  Alb  3.1    /  TBili  0.6    /  DBili  x      /  AST  29     /  ALT  20     /  AlkPhos  85     ( 2019 12:19 )    LIVER FUNCTIONS - ( 2019 12:19 )  Alb: 3.1 g/dL / Pro: 6.1 g/dL / ALK PHOS: 85 U/L / ALT: 20 U/L / AST: 29 U/L / GGT: x           PT/INR -  18.0 sec / 1.54 ratio   ( 2019 22:53 )    ABG - ( 2019 15:13 )  pH: 7.41  /  pCO2: 37    /  pO2: 67    / HCO3: 23    / Base Excess: -1.4  /  SaO2: 95          --------------------------------------------------------------------------------------------  IMAGING  CTA active extrav in proximal transverse colon    ASSESSMENT: Patient is a 63y old female with active LGIB and hyperkalemia for less invasive treatment prior to possibility of extended right hemicolectomy    PLAN:    - Admit to SICU  - q6hr CBC, transfuse PRN  - monitor vitals  - IR for embolization  - GI for colonoscopy , bowel prep  - correct hyperkalemia  - cardiac clearance  - pulmonary per GI  - f/u renal  - NPO  - IVF  - pain control  - DVT ppx  - OOB/ambulate  - strict I/Os  - Patient seen/examined with attending.  - Plan discussed with Attending, Dr. Worrell

## 2019-01-13 NOTE — H&P ADULT - ATTENDING COMMENTS
pt's family states bloody bm for 3-4days. presents with a cta +in colon at hepatic flexure . hh was 5 .K+-7.2 with no change in ekg. abd soft on exam but is obese . plan to treat K+, consult IR for embolization and gi or c/scope, transfuse prbc , admit to sicu

## 2019-01-13 NOTE — BRIEF OPERATIVE NOTE - OPERATION/FINDINGS
1) SMA and its branches supplying the ascending and proximal transverse colon were selected and multiple angiographic images were obtained  2) No active arterial extravasation noted  3) R CFA closed

## 2019-01-13 NOTE — ED ADULT NURSE REASSESSMENT NOTE - NS ED NURSE REASSESS COMMENT FT1
MD from IR at bedside pt will have K+ level re-checked before going to IR and will remain in ED until resulted. Pt to go to IR prior to ICU bed contingent on lab results.

## 2019-01-14 ENCOUNTER — RESULT REVIEW (OUTPATIENT)
Age: 64
End: 2019-01-14

## 2019-01-14 DIAGNOSIS — I73.9 PERIPHERAL VASCULAR DISEASE, UNSPECIFIED: ICD-10-CM

## 2019-01-14 DIAGNOSIS — E11.9 TYPE 2 DIABETES MELLITUS WITHOUT COMPLICATIONS: ICD-10-CM

## 2019-01-14 DIAGNOSIS — E66.01 MORBID (SEVERE) OBESITY DUE TO EXCESS CALORIES: ICD-10-CM

## 2019-01-14 DIAGNOSIS — Z98.890 OTHER SPECIFIED POSTPROCEDURAL STATES: ICD-10-CM

## 2019-01-14 DIAGNOSIS — H54.8 LEGAL BLINDNESS, AS DEFINED IN USA: ICD-10-CM

## 2019-01-14 DIAGNOSIS — E87.5 HYPERKALEMIA: ICD-10-CM

## 2019-01-14 DIAGNOSIS — I10 ESSENTIAL (PRIMARY) HYPERTENSION: ICD-10-CM

## 2019-01-14 DIAGNOSIS — K92.2 GASTROINTESTINAL HEMORRHAGE, UNSPECIFIED: ICD-10-CM

## 2019-01-14 LAB
ANION GAP SERPL CALC-SCNC: 10 MMOL/L — SIGNIFICANT CHANGE UP (ref 5–17)
ANION GAP SERPL CALC-SCNC: 12 MMOL/L — SIGNIFICANT CHANGE UP (ref 5–17)
APTT BLD: 27.4 SEC — LOW (ref 27.5–36.3)
APTT BLD: 27.8 SEC — SIGNIFICANT CHANGE UP (ref 27.5–36.3)
BASOPHILS # BLD AUTO: 0 K/UL — SIGNIFICANT CHANGE UP (ref 0–0.2)
BASOPHILS NFR BLD AUTO: 0.2 % — SIGNIFICANT CHANGE UP (ref 0–2)
BUN SERPL-MCNC: 40 MG/DL — HIGH (ref 8–20)
BUN SERPL-MCNC: 47 MG/DL — HIGH (ref 8–20)
CALCIUM SERPL-MCNC: 8.1 MG/DL — LOW (ref 8.6–10.2)
CALCIUM SERPL-MCNC: 8.2 MG/DL — LOW (ref 8.6–10.2)
CHLORIDE SERPL-SCNC: 105 MMOL/L — SIGNIFICANT CHANGE UP (ref 98–107)
CHLORIDE SERPL-SCNC: 106 MMOL/L — SIGNIFICANT CHANGE UP (ref 98–107)
CO2 SERPL-SCNC: 25 MMOL/L — SIGNIFICANT CHANGE UP (ref 22–29)
CO2 SERPL-SCNC: 26 MMOL/L — SIGNIFICANT CHANGE UP (ref 22–29)
CREAT SERPL-MCNC: 1.01 MG/DL — SIGNIFICANT CHANGE UP (ref 0.5–1.3)
CREAT SERPL-MCNC: 1.03 MG/DL — SIGNIFICANT CHANGE UP (ref 0.5–1.3)
EOSINOPHIL # BLD AUTO: 0 K/UL — SIGNIFICANT CHANGE UP (ref 0–0.5)
EOSINOPHIL NFR BLD AUTO: 0.2 % — SIGNIFICANT CHANGE UP (ref 0–6)
GAS PNL BLDA: SIGNIFICANT CHANGE UP
GLUCOSE BLDC GLUCOMTR-MCNC: 157 MG/DL — HIGH (ref 70–99)
GLUCOSE BLDC GLUCOMTR-MCNC: 180 MG/DL — HIGH (ref 70–99)
GLUCOSE BLDC GLUCOMTR-MCNC: 234 MG/DL — HIGH (ref 70–99)
GLUCOSE BLDC GLUCOMTR-MCNC: 93 MG/DL — SIGNIFICANT CHANGE UP (ref 70–99)
GLUCOSE SERPL-MCNC: 178 MG/DL — HIGH (ref 70–115)
GLUCOSE SERPL-MCNC: 92 MG/DL — SIGNIFICANT CHANGE UP (ref 70–115)
HCT VFR BLD CALC: 21.5 % — LOW (ref 37–47)
HCT VFR BLD CALC: 25.8 % — LOW (ref 37–47)
HCT VFR BLD CALC: 26.9 % — LOW (ref 37–47)
HGB BLD-MCNC: 7.5 G/DL — LOW (ref 12–16)
HGB BLD-MCNC: 9 G/DL — LOW (ref 12–16)
HGB BLD-MCNC: 9.1 G/DL — LOW (ref 12–16)
INR BLD: 1.34 RATIO — HIGH (ref 0.88–1.16)
INR BLD: 1.41 RATIO — HIGH (ref 0.88–1.16)
LYMPHOCYTES # BLD AUTO: 1.1 K/UL — SIGNIFICANT CHANGE UP (ref 1–4.8)
LYMPHOCYTES # BLD AUTO: 18.2 % — LOW (ref 20–55)
MAGNESIUM SERPL-MCNC: 2.3 MG/DL — SIGNIFICANT CHANGE UP (ref 1.6–2.6)
MAGNESIUM SERPL-MCNC: 2.3 MG/DL — SIGNIFICANT CHANGE UP (ref 1.6–2.6)
MCHC RBC-ENTMCNC: 29 PG — SIGNIFICANT CHANGE UP (ref 27–31)
MCHC RBC-ENTMCNC: 29.1 PG — SIGNIFICANT CHANGE UP (ref 27–31)
MCHC RBC-ENTMCNC: 29.5 PG — SIGNIFICANT CHANGE UP (ref 27–31)
MCHC RBC-ENTMCNC: 33.8 G/DL — SIGNIFICANT CHANGE UP (ref 32–36)
MCHC RBC-ENTMCNC: 34.9 G/DL — SIGNIFICANT CHANGE UP (ref 32–36)
MCHC RBC-ENTMCNC: 34.9 G/DL — SIGNIFICANT CHANGE UP (ref 32–36)
MCV RBC AUTO: 83.3 FL — SIGNIFICANT CHANGE UP (ref 81–99)
MCV RBC AUTO: 84.6 FL — SIGNIFICANT CHANGE UP (ref 81–99)
MCV RBC AUTO: 85.7 FL — SIGNIFICANT CHANGE UP (ref 81–99)
MONOCYTES # BLD AUTO: 0.6 K/UL — SIGNIFICANT CHANGE UP (ref 0–0.8)
MONOCYTES NFR BLD AUTO: 9.7 % — SIGNIFICANT CHANGE UP (ref 3–10)
NEUTROPHILS # BLD AUTO: 4.3 K/UL — SIGNIFICANT CHANGE UP (ref 1.8–8)
NEUTROPHILS NFR BLD AUTO: 71.5 % — SIGNIFICANT CHANGE UP (ref 37–73)
PHOSPHATE SERPL-MCNC: 3.9 MG/DL — SIGNIFICANT CHANGE UP (ref 2.4–4.7)
PHOSPHATE SERPL-MCNC: 4.3 MG/DL — SIGNIFICANT CHANGE UP (ref 2.4–4.7)
PLATELET # BLD AUTO: 113 K/UL — LOW (ref 150–400)
PLATELET # BLD AUTO: 123 K/UL — LOW (ref 150–400)
PLATELET # BLD AUTO: 99 K/UL — LOW (ref 150–400)
POTASSIUM SERPL-MCNC: 4 MMOL/L — SIGNIFICANT CHANGE UP (ref 3.5–5.3)
POTASSIUM SERPL-MCNC: 4.8 MMOL/L — SIGNIFICANT CHANGE UP (ref 3.5–5.3)
POTASSIUM SERPL-SCNC: 4 MMOL/L — SIGNIFICANT CHANGE UP (ref 3.5–5.3)
POTASSIUM SERPL-SCNC: 4.8 MMOL/L — SIGNIFICANT CHANGE UP (ref 3.5–5.3)
PROTHROM AB SERPL-ACNC: 15.5 SEC — HIGH (ref 10–12.9)
PROTHROM AB SERPL-ACNC: 16.4 SEC — HIGH (ref 10–12.9)
RBC # BLD: 2.58 M/UL — LOW (ref 4.4–5.2)
RBC # BLD: 3.05 M/UL — LOW (ref 4.4–5.2)
RBC # BLD: 3.14 M/UL — LOW (ref 4.4–5.2)
RBC # FLD: 16.4 % — HIGH (ref 11–15.6)
RBC # FLD: 16.8 % — HIGH (ref 11–15.6)
RBC # FLD: 17 % — HIGH (ref 11–15.6)
SODIUM SERPL-SCNC: 141 MMOL/L — SIGNIFICANT CHANGE UP (ref 135–145)
SODIUM SERPL-SCNC: 143 MMOL/L — SIGNIFICANT CHANGE UP (ref 135–145)
WBC # BLD: 6 K/UL — SIGNIFICANT CHANGE UP (ref 4.8–10.8)
WBC # BLD: 6.2 K/UL — SIGNIFICANT CHANGE UP (ref 4.8–10.8)
WBC # BLD: 6.2 K/UL — SIGNIFICANT CHANGE UP (ref 4.8–10.8)
WBC # FLD AUTO: 6 K/UL — SIGNIFICANT CHANGE UP (ref 4.8–10.8)
WBC # FLD AUTO: 6.2 K/UL — SIGNIFICANT CHANGE UP (ref 4.8–10.8)
WBC # FLD AUTO: 6.2 K/UL — SIGNIFICANT CHANGE UP (ref 4.8–10.8)

## 2019-01-14 PROCEDURE — 93306 TTE W/DOPPLER COMPLETE: CPT | Mod: 26

## 2019-01-14 PROCEDURE — 88305 TISSUE EXAM BY PATHOLOGIST: CPT | Mod: 26

## 2019-01-14 PROCEDURE — 99231 SBSQ HOSP IP/OBS SF/LOW 25: CPT

## 2019-01-14 PROCEDURE — 99233 SBSQ HOSP IP/OBS HIGH 50: CPT | Mod: 25

## 2019-01-14 PROCEDURE — 99223 1ST HOSP IP/OBS HIGH 75: CPT

## 2019-01-14 RX ORDER — INSULIN LISPRO 100/ML
VIAL (ML) SUBCUTANEOUS EVERY 6 HOURS
Qty: 0 | Refills: 0 | Status: DISCONTINUED | OUTPATIENT
Start: 2019-01-14 | End: 2019-01-14

## 2019-01-14 RX ORDER — INSULIN LISPRO 100/ML
VIAL (ML) SUBCUTANEOUS EVERY 6 HOURS
Qty: 0 | Refills: 0 | Status: DISCONTINUED | OUTPATIENT
Start: 2019-01-14 | End: 2019-01-15

## 2019-01-14 RX ORDER — INSULIN LISPRO 100/ML
2 VIAL (ML) SUBCUTANEOUS ONCE
Qty: 0 | Refills: 0 | Status: COMPLETED | OUTPATIENT
Start: 2019-01-14 | End: 2019-01-14

## 2019-01-14 RX ORDER — INSULIN LISPRO 100/ML
VIAL (ML) SUBCUTANEOUS
Qty: 0 | Refills: 0 | Status: DISCONTINUED | OUTPATIENT
Start: 2019-01-14 | End: 2019-01-14

## 2019-01-14 RX ORDER — CALCIUM GLUCONATE 100 MG/ML
1 VIAL (ML) INTRAVENOUS ONCE
Qty: 0 | Refills: 0 | Status: COMPLETED | OUTPATIENT
Start: 2019-01-14 | End: 2019-01-14

## 2019-01-14 RX ADMIN — PANTOPRAZOLE SODIUM 40 MILLIGRAM(S): 20 TABLET, DELAYED RELEASE ORAL at 06:13

## 2019-01-14 RX ADMIN — Medication 2 UNIT(S): at 00:52

## 2019-01-14 RX ADMIN — CHLORHEXIDINE GLUCONATE 1 APPLICATION(S): 213 SOLUTION TOPICAL at 11:10

## 2019-01-14 RX ADMIN — GABAPENTIN 100 MILLIGRAM(S): 400 CAPSULE ORAL at 22:34

## 2019-01-14 RX ADMIN — GABAPENTIN 100 MILLIGRAM(S): 400 CAPSULE ORAL at 06:13

## 2019-01-14 RX ADMIN — Medication 1: at 11:34

## 2019-01-14 RX ADMIN — CARVEDILOL PHOSPHATE 25 MILLIGRAM(S): 80 CAPSULE, EXTENDED RELEASE ORAL at 06:13

## 2019-01-14 RX ADMIN — Medication 400 MILLIGRAM(S): at 11:19

## 2019-01-14 RX ADMIN — HYDROMORPHONE HYDROCHLORIDE 1 MILLIGRAM(S): 2 INJECTION INTRAMUSCULAR; INTRAVENOUS; SUBCUTANEOUS at 23:45

## 2019-01-14 RX ADMIN — Medication 200 GRAM(S): at 10:42

## 2019-01-14 RX ADMIN — SODIUM CHLORIDE 60 MILLILITER(S): 9 INJECTION, SOLUTION INTRAVENOUS at 11:35

## 2019-01-14 RX ADMIN — Medication 4000 MILLILITER(S): at 05:30

## 2019-01-14 RX ADMIN — Medication 1000 MILLIGRAM(S): at 11:30

## 2019-01-14 RX ADMIN — Medication 1: at 05:56

## 2019-01-14 NOTE — PROGRESS NOTE ADULT - SUBJECTIVE AND OBJECTIVE BOX
NEPHROLOGY INTERVAL HPI/OVERNIGHT EVENTS:    Examined earlier    MEDICATIONS  (STANDING):  carvedilol 25 milliGRAM(s) Oral every 12 hours  chlorhexidine 2% Cloths 1 Application(s) Topical daily  dextrose 5%. 1000 milliLiter(s) (50 mL/Hr) IV Continuous <Continuous>  dextrose 50% Injectable 12.5 Gram(s) IV Push once  dextrose 50% Injectable 25 Gram(s) IV Push once  dextrose 50% Injectable 25 Gram(s) IV Push once  DULoxetine 60 milliGRAM(s) Oral daily  gabapentin 100 milliGRAM(s) Oral three times a day  insulin lispro (HumaLOG) corrective regimen sliding scale   SubCutaneous every 6 hours  multiple electrolytes Injection Type 1 1000 milliLiter(s) (60 mL/Hr) IV Continuous <Continuous>  pantoprazole    Tablet 40 milliGRAM(s) Oral before breakfast    MEDICATIONS  (PRN):  dextrose 40% Gel 15 Gram(s) Oral once PRN Blood Glucose LESS THAN 70 milliGRAM(s)/deciliter  glucagon  Injectable 1 milliGRAM(s) IntraMuscular once PRN Glucose LESS THAN 70 milligrams/deciliter      Allergies    Accupril (Other)    Intolerances        Vital Signs Last 24 Hrs  T(C): 36.6 (2019 12:00), Max: 37.2 (2019 00:00)  T(F): 97.9 (2019 12:00), Max: 99 (2019 00:00)  HR: 66 (2019 13:00) (64 - 79)  BP: 100/55 (2019 09:00) (100/55 - 131/42)  BP(mean): 71 (2019 09:00) (71 - 71)  RR: 16 (2019 13:00) (12 - 23)  SpO2: 100% (2019 13:00) (92% - 100%)  Daily     Daily Weight in k.8 (2019 13:00)    PHYSICAL EXAM:  Appearance: Normal	  HEENT:  EOMI, + R catarct 	  Lymphatic: No cervical lymphadenopathy  Cardiovascular: Normal S1 S2, No JVD, systolic murmur   Respiratory: Decreased BS at bases 	  Neuro: A & O x 3  Gastrointestinal:  Soft, Non-tender, + BS	  Extremities: trace LE edema    LABS:                        9.0    6.2   )-----------( 113      ( 2019 12:40 )             25.8         141  |  106  |  47.0<H>  ----------------------------<  178<H>  4.8   |  25.0  |  1.01    Ca    8.1<L>      2019 05:33  Phos  4.3       Mg     2.3         TPro  6.1<L>  /  Alb  3.1<L>  /  TBili  0.6  /  DBili  x   /  AST  29  /  ALT  20  /  AlkPhos  85      PT/INR - ( 2019 12:40 )   PT: 15.5 sec;   INR: 1.34 ratio         PTT - ( 2019 12:40 )  PTT:27.8 sec    Magnesium, Serum: 2.3 mg/dL ( @ 05:33)  Phosphorus Level, Serum: 4.3 mg/dL ( @ 05:33)  Magnesium, Serum: 2.2 mg/dL ( @ 22:53)  Phosphorus Level, Serum: 4.7 mg/dL ( @ 22:53)    ABG - ( 2019 08:47 )  pH, Arterial: 7.43  pH, Blood: x     /  pCO2: 41    /  pO2: 124   / HCO3: 27    / Base Excess: 2.7   /  SaO2: 100                   RADIOLOGY & ADDITIONAL TESTS:

## 2019-01-14 NOTE — CONSULT NOTE ADULT - ASSESSMENT
A/P: 64 y/o F PMHx of CAD, s/p CABG x1 (lima->LAD 5/2015), MADHAVI x2-> LCX & OM1 (8/2015), Nuclear stress 11/2018 with mildly positive ischemia in RCA (medically managed), Cardiac arrest due to hypercapnea, HFpEF (11/2018 EF 65-70%), RV dysfunction and severe pulmonary HTN, PAD s/p balloon angioplasty, DM2, HTN, HLD who presents to the ED with abdominal pain and bloody BMs. EKG with unchanged interfolateral T wave inversions.    1. Radha-operative Cardiac Risk Stratification   - RCRI risk of 6.6%  - Recent stress test with mild inferior ischemia  - Severe pulmonary HTN and RV failure  - Patient is at an elevated risk of cardiac complication radha-operatively   - Will discuss clearance with Dr. Recio    2. HFpEF, Severe Pulmonary HTN  - Patient received multiple units of blood products  - Received Lasix 60mg IV yesterday  - On Torsemide 20mg PO BID  -    3. CAD  - Hx of CABG and MADHAVI  - Recent stress test mildly positive for RCA disease  - Medically managed  - ASA and Plavix on hold due to GI bleed, would advise restarting ASA when able  - Restart Losartan and Imdur when BP allows     Preliminary evaluation, please await official recommendations by Dr. Recio A/P: 62 y/o F PMHx of CAD, s/p CABG x1 ([facility]->LAD 5/2015), MADHAVI x2-> LCX & OM1 (8/2015), Nuclear stress 11/2018 with mildly positive ischemia in RCA (medically managed), Cardiac arrest due to hypercapnea, HFpEF (11/2018 EF 65-70%), RV dysfunction and severe pulmonary HTN, PAD s/p balloon angioplasty, DM2, HTN, HLD who presents to the ED with abdominal pain and bloody BMs. EKG with unchanged interfolateral T wave inversions.    1. Radha-operative Cardiac Risk Stratification   - RCRI risk of 6.6%  - Recent stress test with mild inferior ischemia  - Severe pulmonary HTN and RV failure  - Patient is at an elevated risk of cardiac complication radha-operatively, however patient is currently optimized so no absolute cardiac contraindications to the planned procedure.     2. HFpEF, Severe Pulmonary HTN  - Patient received multiple units of blood products  - Received Lasix 60mg IV yesterday  - On Torsemide 20mg PO BID at home.  - Initiate Lasix 40mg IV daily.     3. CAD  - Hx of CABG and MADHAVI  - Recent stress test mildly positive for RCA disease  - Medically managed  - ASA and Plavix on hold due to GI bleed, would advise restarting ASA when able  - Restart Losartan and Imdur when BP allows     Preliminary evaluation, please await official recommendations by Dr. Recio

## 2019-01-14 NOTE — BRIEF OPERATIVE NOTE - OPERATION/FINDINGS
1 cm polyp @ hep flexure   no old blood or active bleeding  colon unremarkable otherwise  Unable to intubate TI but yellow stool seen exiting the IC valve

## 2019-01-14 NOTE — PROGRESS NOTE ADULT - ASSESSMENT
A/p: 63y F admitted for LGIB, found in proximal transverse colon, s/p IR angiography without signs of extrav or active bleeding, plan for scope by GI 1/14    Neuro: Adequate pain A/p: 63y F admitted for LGIB, found in proximal transverse colon, s/p IR angiography without signs of extrav or active bleeding, plan for scope by GI 1/14    Neuro: Adequate pain control.    Card: Continue hemodynamic monitoring via arterial line. F/u repeat cbc for adequate response to PRBC    Pulm: Cont NC, Pulmonary toileting, IS    GI: Plan for bowel prep 1/14 for colonoscopy w/ GI. NPO.  Refusing NGT at this time    : Russo in place, replete labs prn.  Hyperkalemia improved.     Endo: ISS    Hem/DVT: SCDs    ID: None    Lines/Tubes: RIJ cordis, L rad jaswinder    Skin: R groin checks post op    Dispo: SICU

## 2019-01-14 NOTE — PROGRESS NOTE ADULT - SUBJECTIVE AND OBJECTIVE BOX
HISTORY  63y Female    24 HOUR EVENTS: Pt continues to be hemodynamically well, unresponsive to 3 units PRBC given in ED.  Transported to IR for possible embolization however, no signs of active bleeding at that time.  No other bowel movements since admitted to SICU.  Repeat labs hgb unresponsive, given 1 PRBC, 2 FFP, will repeat in AM.  Patient denies N/V/ CP, SOB, dyspnea, hematemsis, melena or BRBPR.  Patient continues to be altered, responds to questions appropriately however, falls asleep mid conversation.  Patient will attempt to take po bowel prep for planned colonoscopy in AM by GI.    SUBJECTIVE/ROS:  [ ] A ten-point review of systems was otherwise negative except as noted.  [ ] Due to altered mental status/intubation, subjective information were not able to be obtained from the patient. History was obtained, to the extent possible, from review of the chart and collateral sources of information.      NEURO  RASS:     GCS:     CAM ICU:  Exam:   Meds: acetaminophen  IVPB .. 1000 milliGRAM(s) IV Intermittent once  DULoxetine 60 milliGRAM(s) Oral daily  gabapentin 100 milliGRAM(s) Oral three times a day    [x] Adequacy of sedation and pain control has been assessed and adjusted      RESPIRATORY  RR: 18 (01-14-19 @ 03:00) (18 - 23)  SpO2: 100% (01-14-19 @ 03:00) (92% - 100%)  Wt(kg): --  Exam: unlabored, clear to auscultation bilaterally  Mechanical Ventilation:   ABG - ( 13 Jan 2019 15:13 )  pH: 7.41  /  pCO2: 37    /  pO2: 67    / HCO3: 23    / Base Excess: -1.4  /  SaO2: 95      Lactate: x                [ ] Extubation Readiness Assessed  Meds:       CARDIOVASCULAR  HR: 68 (01-14-19 @ 03:00) (68 - 82)  BP: 131/42 (01-13-19 @ 19:45) (102/55 - 131/42)  BP(mean): --  ABP: 116/46 (01-14-19 @ 03:00) (116/46 - 143/57)  ABP(mean): 70 (01-14-19 @ 03:00) (70 - 89)  Wt(kg): --  CVP(cm H2O): --      Exam:  Cardiac Rhythm:  Perfusion     [ ]Adequate   [ ]Inadequate  Mentation   [ ]Normal       [ ]Reduced  Extremities  [ ]Warm         [ ]Cool  Volume Status [ ]Hypervolemic [ ]Euvolemic [ ]Hypovolemic  Meds: carvedilol 25 milliGRAM(s) Oral every 12 hours        GI/NUTRITION  Exam:  Diet:  Meds: pantoprazole    Tablet 40 milliGRAM(s) Oral before breakfast  polyethylene glycol/electrolyte Solution. 4000 milliLiter(s) Oral once      GENITOURINARY  I&O's Detail    01-13 @ 07:01  -  01-14 @ 04:31  --------------------------------------------------------  IN:    multiple electrolytes Injection Type 1: 300 mL    Packed Red Blood Cells: 350 mL  Total IN: 650 mL    OUT:    Indwelling Catheter - Urethral: 1005 mL  Total OUT: 1005 mL    Total NET: -355 mL        Weight (kg): 84.9 (01-14 @ 00:00)  01-13    140  |  106  |  56.0<H>  ----------------------------<  223<H>  5.1   |  25.0  |  1.15    Ca    8.1<L>      13 Jan 2019 22:53  Phos  4.7     01-13  Mg     2.2     01-13    TPro  6.1<L>  /  Alb  3.1<L>  /  TBili  0.6  /  DBili  x   /  AST  29  /  ALT  20  /  AlkPhos  85  01-13    [ ] Russo catheter, indication: N/A  Meds: dextrose 5%. 1000 milliLiter(s) IV Continuous <Continuous>  multiple electrolytes Injection Type 1 1000 milliLiter(s) IV Continuous <Continuous>        HEMATOLOGIC  Meds:   [x] VTE Prophylaxis                        6.8    6.6   )-----------( 104      ( 13 Jan 2019 22:53 )             19.2     PT/INR - ( 13 Jan 2019 22:53 )   PT: 18.0 sec;   INR: 1.54 ratio         PTT - ( 13 Jan 2019 09:14 )  PTT:29.9 sec  Transfusion     [ ] PRBC   [ ] Platelets   [ ] FFP   [ ] Cryoprecipitate      INFECTIOUS DISEASES  T(C): 37.2 (01-14-19 @ 02:00), Max: 37.2 (01-14-19 @ 00:00)  Wt(kg): --  WBC Count: 6.6 K/uL (01-13 @ 22:53)  WBC Count: 8.0 K/uL (01-13 @ 18:56)  WBC Count: 7.1 K/uL (01-13 @ 09:14)    Recent Cultures:    Meds:       ENDOCRINE  Capillary Blood Glucose    Meds: dextrose 40% Gel 15 Gram(s) Oral once PRN  dextrose 50% Injectable 12.5 Gram(s) IV Push once  dextrose 50% Injectable 25 Gram(s) IV Push once  dextrose 50% Injectable 25 Gram(s) IV Push once  glucagon  Injectable 1 milliGRAM(s) IntraMuscular once PRN  insulin lispro (HumaLOG) corrective regimen sliding scale   SubCutaneous every 6 hours        ACCESS DEVICES:  [ ] Peripheral IV  [ ] Central Venous Line	[ ] R	[ ] L	[ ] IJ	[ ] Fem	[ ] SC	Placed:   [ ] Arterial Line		[ ] R	[ ] L	[ ] Fem	[ ] Rad	[ ] Ax	Placed:   [ ] PICC:					[ ] Mediport  [ ] Urinary Catheter, Date Placed:   [ ] Necessity of urinary, arterial, and venous catheters discussed    OTHER MEDICATIONS:  chlorhexidine 2% Cloths 1 Application(s) Topical daily      CODE STATUS:     IMAGING: HISTORY  63y Female    24 HOUR EVENTS: Pt continues to be hemodynamically well, unresponsive to 3 units PRBC given in ED.  Transported to IR for possible embolization however, no signs of active bleeding at that time.  No other bowel movements since admitted to SICU.  Repeat labs hgb unresponsive, given 1 PRBC, 2 FFP, will repeat in AM.  Patient denies N/V/ CP, SOB, dyspnea, hematemsis, melena or BRBPR.  Patient continues to be drowsy however, responds to questions appropriately when prompted.  Patient will attempt to take po bowel prep for planned colonoscopy in AM by GI.     SUBJECTIVE/ROS:  [ x] A ten-point review of systems was otherwise negative except as noted.  [ ] Due to altered mental status/intubation, subjective information were not able to be obtained from the patient. History was obtained, to the extent possible, from review of the chart and collateral sources of information.      NEURO  RASS: -1     GCS: 15/14    CAM ICU: neg  Exam: Patient drowsy, AAOx3, waxes and wanes, follows commands, GAN   Meds: acetaminophen  IVPB .. 1000 milliGRAM(s) IV Intermittent once  DULoxetine 60 milliGRAM(s) Oral daily  gabapentin 100 milliGRAM(s) Oral three times a day    [x] Adequacy of sedation and pain control has been assessed and adjusted      RESPIRATORY  RR: 18 (01-14-19 @ 03:00) (18 - 23)  SpO2: 100% (01-14-19 @ 03:00) (92% - 100%)  Wt(kg): --  Exam: unlabored, clear to auscultation bilaterally  Mechanical Ventilation:   ABG - ( 13 Jan 2019 15:13 )  pH: 7.41  /  pCO2: 37    /  pO2: 67    / HCO3: 23    / Base Excess: -1.4  /  SaO2: 95      Lactate: x                [ ] Extubation Readiness Assessed  Meds:       CARDIOVASCULAR  HR: 68 (01-14-19 @ 03:00) (68 - 82)  BP: 131/42 (01-13-19 @ 19:45) (102/55 - 131/42)  BP(mean): --  ABP: 116/46 (01-14-19 @ 03:00) (116/46 - 143/57)  ABP(mean): 70 (01-14-19 @ 03:00) (70 - 89)  Wt(kg): --  CVP(cm H2O): --      Exam: RRR   Cardiac Rhythm: NSR  Perfusion     [x ]Adequate   [ ]Inadequate  Mentation   [x ]Normal       [ ]Reduced  Extremities  [x ]Warm         [ ]Cool  Volume Status [ ]Hypervolemic [x ]Euvolemic [ ]Hypovolemic  Meds: carvedilol 25 milliGRAM(s) Oral every 12 hours        GI/NUTRITION  Exam: obese abd, soft, nttp, nondistended  Diet: NPO   Meds: pantoprazole    Tablet 40 milliGRAM(s) Oral before breakfast  polyethylene glycol/electrolyte Solution. 4000 milliLiter(s) Oral once      GENITOURINARY  I&O's Detail    01-13 @ 07:01  -  01-14 @ 04:31  --------------------------------------------------------  IN:    multiple electrolytes Injection Type 1: 300 mL    Packed Red Blood Cells: 350 mL  Total IN: 650 mL    OUT:    Indwelling Catheter - Urethral: 1005 mL  Total OUT: 1005 mL    Total NET: -355 mL        Weight (kg): 84.9 (01-14 @ 00:00)  01-13    140  |  106  |  56.0<H>  ----------------------------<  223<H>  5.1   |  25.0  |  1.15    Ca    8.1<L>      13 Jan 2019 22:53  Phos  4.7     01-13  Mg     2.2     01-13    TPro  6.1<L>  /  Alb  3.1<L>  /  TBili  0.6  /  DBili  x   /  AST  29  /  ALT  20  /  AlkPhos  85  01-13    [x] Rusos catheter, indication: critically ill, strict i/o's   Meds: dextrose 5%. 1000 milliLiter(s) IV Continuous <Continuous>  multiple electrolytes Injection Type 1 1000 milliLiter(s) IV Continuous <Continuous>        HEMATOLOGIC  Meds:   [x] VTE Prophylaxis                        6.8    6.6   )-----------( 104      ( 13 Jan 2019 22:53 )             19.2     PT/INR - ( 13 Jan 2019 22:53 )   PT: 18.0 sec;   INR: 1.54 ratio         PTT - ( 13 Jan 2019 09:14 )  PTT:29.9 sec  Transfusion     [ ] PRBC   [ ] Platelets   [ ] FFP   [ ] Cryoprecipitate      INFECTIOUS DISEASES  T(C): 37.2 (01-14-19 @ 02:00), Max: 37.2 (01-14-19 @ 00:00)  Wt(kg): --  WBC Count: 6.6 K/uL (01-13 @ 22:53)  WBC Count: 8.0 K/uL (01-13 @ 18:56)  WBC Count: 7.1 K/uL (01-13 @ 09:14)    Recent Cultures:    Meds:       ENDOCRINE  Capillary Blood Glucose    Meds: dextrose 40% Gel 15 Gram(s) Oral once PRN  dextrose 50% Injectable 12.5 Gram(s) IV Push once  dextrose 50% Injectable 25 Gram(s) IV Push once  dextrose 50% Injectable 25 Gram(s) IV Push once  glucagon  Injectable 1 milliGRAM(s) IntraMuscular once PRN  insulin lispro (HumaLOG) corrective regimen sliding scale   SubCutaneous every 6 hours        ACCESS DEVICES:  [x ] Peripheral IV  [x ] Central Venous Line	[x ] R	[ ] L	[x ] IJ	[ ] Fem	[ ] SC	Placed:   [x ] Arterial Line		[ ] R	[ x] L	[ ] Fem	[ x] Rad	[ ] Ax	Placed:   [ ] PICC:					[ ] Mediport  [ ] Urinary Catheter, Date Placed:   [ ] Necessity of urinary, arterial, and venous catheters discussed    OTHER MEDICATIONS:  chlorhexidine 2% Cloths 1 Application(s) Topical daily      CODE STATUS:     IMAGING: HISTORY  63y Female    24 HOUR EVENTS: Pt continues to be hemodynamically well, unresponsive to 3 units PRBC given in ED.  Transported to IR for possible embolization however, no signs of active bleeding at that time.  No other bowel movements since admitted to SICU.  Repeat labs hgb unresponsive, given 1 PRBC, 2 FFP, will repeat in AM.  Patient denies N/V/ CP, SOB, dyspnea, hematemsis, melena or BRBPR.  Patient continues to be drowsy however, responds to questions appropriately when prompted.  Patient will attempt to take po bowel prep for planned colonoscopy in AM by GI. Hyperkalemia improved repeat K 5.1.    SUBJECTIVE/ROS:  [ x] A ten-point review of systems was otherwise negative except as noted.  [ ] Due to altered mental status/intubation, subjective information were not able to be obtained from the patient. History was obtained, to the extent possible, from review of the chart and collateral sources of information.      NEURO  RASS: -1     GCS: 15/14    CAM ICU: neg  Exam: Patient drowsy, AAOx3, waxes and wanes, follows commands, GAN   Meds: acetaminophen  IVPB .. 1000 milliGRAM(s) IV Intermittent once  DULoxetine 60 milliGRAM(s) Oral daily  gabapentin 100 milliGRAM(s) Oral three times a day    [x] Adequacy of sedation and pain control has been assessed and adjusted      RESPIRATORY  RR: 18 (01-14-19 @ 03:00) (18 - 23)  SpO2: 100% (01-14-19 @ 03:00) (92% - 100%)  Wt(kg): --  Exam: unlabored, clear to auscultation bilaterally  Mechanical Ventilation:   ABG - ( 13 Jan 2019 15:13 )  pH: 7.41  /  pCO2: 37    /  pO2: 67    / HCO3: 23    / Base Excess: -1.4  /  SaO2: 95      Lactate: x                [ ] Extubation Readiness Assessed  Meds:       CARDIOVASCULAR  HR: 68 (01-14-19 @ 03:00) (68 - 82)  BP: 131/42 (01-13-19 @ 19:45) (102/55 - 131/42)  BP(mean): --  ABP: 116/46 (01-14-19 @ 03:00) (116/46 - 143/57)  ABP(mean): 70 (01-14-19 @ 03:00) (70 - 89)  Wt(kg): --  CVP(cm H2O): --      Exam: RRR   Cardiac Rhythm: NSR  Perfusion     [x ]Adequate   [ ]Inadequate  Mentation   [x ]Normal       [ ]Reduced  Extremities  [x ]Warm         [ ]Cool  Volume Status [ ]Hypervolemic [x ]Euvolemic [ ]Hypovolemic  Meds: carvedilol 25 milliGRAM(s) Oral every 12 hours        GI/NUTRITION  Exam: obese abd, soft, nttp, nondistended  Diet: NPO   Meds: pantoprazole    Tablet 40 milliGRAM(s) Oral before breakfast  polyethylene glycol/electrolyte Solution. 4000 milliLiter(s) Oral once      GENITOURINARY  I&O's Detail    01-13 @ 07:01  -  01-14 @ 04:31  --------------------------------------------------------  IN:    multiple electrolytes Injection Type 1: 300 mL    Packed Red Blood Cells: 350 mL  Total IN: 650 mL    OUT:    Indwelling Catheter - Urethral: 1005 mL  Total OUT: 1005 mL    Total NET: -355 mL        Weight (kg): 84.9 (01-14 @ 00:00)  01-13    140  |  106  |  56.0<H>  ----------------------------<  223<H>  5.1   |  25.0  |  1.15    Ca    8.1<L>      13 Jan 2019 22:53  Phos  4.7     01-13  Mg     2.2     01-13    TPro  6.1<L>  /  Alb  3.1<L>  /  TBili  0.6  /  DBili  x   /  AST  29  /  ALT  20  /  AlkPhos  85  01-13    [x] Russo catheter, indication: critically ill, strict i/o's   Meds: dextrose 5%. 1000 milliLiter(s) IV Continuous <Continuous>  multiple electrolytes Injection Type 1 1000 milliLiter(s) IV Continuous <Continuous>        HEMATOLOGIC  Meds:   [x] VTE Prophylaxis                        6.8    6.6   )-----------( 104      ( 13 Jan 2019 22:53 )             19.2     PT/INR - ( 13 Jan 2019 22:53 )   PT: 18.0 sec;   INR: 1.54 ratio         PTT - ( 13 Jan 2019 09:14 )  PTT:29.9 sec  Transfusion     [ ] PRBC   [ ] Platelets   [ ] FFP   [ ] Cryoprecipitate      INFECTIOUS DISEASES  T(C): 37.2 (01-14-19 @ 02:00), Max: 37.2 (01-14-19 @ 00:00)  Wt(kg): --  WBC Count: 6.6 K/uL (01-13 @ 22:53)  WBC Count: 8.0 K/uL (01-13 @ 18:56)  WBC Count: 7.1 K/uL (01-13 @ 09:14)    Recent Cultures:    Meds:       ENDOCRINE  Capillary Blood Glucose    Meds: dextrose 40% Gel 15 Gram(s) Oral once PRN  dextrose 50% Injectable 12.5 Gram(s) IV Push once  dextrose 50% Injectable 25 Gram(s) IV Push once  dextrose 50% Injectable 25 Gram(s) IV Push once  glucagon  Injectable 1 milliGRAM(s) IntraMuscular once PRN  insulin lispro (HumaLOG) corrective regimen sliding scale   SubCutaneous every 6 hours        ACCESS DEVICES:  [x ] Peripheral IV  [x ] Central Venous Line	[x ] R	[ ] L	[x ] IJ	[ ] Fem	[ ] SC	Placed:   [x ] Arterial Line		[ ] R	[ x] L	[ ] Fem	[ x] Rad	[ ] Ax	Placed:   [ ] PICC:					[ ] Mediport  [ ] Urinary Catheter, Date Placed:   [ ] Necessity of urinary, arterial, and venous catheters discussed    OTHER MEDICATIONS:  chlorhexidine 2% Cloths 1 Application(s) Topical daily      CODE STATUS:     IMAGING:

## 2019-01-14 NOTE — BRIEF OPERATIVE NOTE - PROCEDURE
<<-----Click on this checkbox to enter Procedure Colonoscopy with polypectomy  01/14/2019    Active  [email or web address]

## 2019-01-14 NOTE — PROGRESS NOTE ADULT - PROBLEM SELECTOR PLAN 1
- bleeding from transverse colon   - pt to have NGT for remaing golytely   - planning for colonoscopy today.   - please get cardiac clearance

## 2019-01-14 NOTE — CONSULT NOTE ADULT - ATTENDING COMMENTS
IV lasix, kayexalate, follow up labs.
Patient stable from cardiac standpoint for planned GI procedures/surgery.   Stop aspirin and plavix.   Restart aspirin after GI procedures.

## 2019-01-14 NOTE — PROVIDER CONTACT NOTE (OTHER) - ACTION/TREATMENT ORDERED:
Transfuse 2 u pRBC stat as fast as possible, 2 u FFP following 2 units of PRBC as fast as possible, continue encouraging PO intake of gavilyte. If she can't tolerate PO intake, insert NG tube.

## 2019-01-14 NOTE — CONSULT NOTE ADULT - SUBJECTIVE AND OBJECTIVE BOX
Patient is a 63y old  Female who presents with a chief complaint of     HPI:   Patient is poor historian- HTN, CAD, CHF, PVD hypercapnia. As per Er note (  was here earlier), Pt had dark, loose stools X 1 day. Unclear if she had abdominal pain, nausea or vomiting. pt denies theses symptoms to me now. Denies hxof PUD, neverhad colonoscopy.  Pt is on ASA and plavix. I reviewed old records from  admission. Patient  has chronic anemia ( baseline is mid 8 range in dec ,  Was 10.7 in  . Today Hgb is 5.0. Kis 7.2. Bun 55. CTA as per Er + bleed  in the transverse colon.       I reviewed last admission in - admitted with CHF. Required chest tube.   Has hx of hypercapnia and hypoxia restrictive pulmonary impairment.     REVIEW OF SYSTEMS:  Constitutional: No fever, weight loss or fatigue  ENMT:  No difficulty hearing, tinnitus, vertigo; No sinus or throat pain  Respiratory: No cough, wheezing, chills or hemoptysis  Cardiovascular: No chest pain, palpitations, dizziness or leg swelling  Gastrointestinal: No abdominal or epigastric pain. No nausea, vomiting or hematemesis; No diarrhea or constipation.+  hematochezia.  Skin: No itching, burning, rashes or lesions   Musculoskeletal: No joint pain or swelling; No muscle, back or extremity pain    PAST MEDICAL & SURGICAL HISTORY:  Herniated disc, cervical  PAD (peripheral artery disease)  Legally blind  History of peripheral vascular disease  History of retinal detachment  History of CEA (carotid endarterectomy): Right  Hyperlipidemia  Glaucoma  Hypertension  Diabetes  S/P CABG x 1  After cataract  Uveitic glaucoma of both eyes  Detached retina  Atherosclerosis of right carotid artery   delivery delivered      FAMILY HISTORY:  Family history of diabetes mellitus (Father, Mother)  Family history of essential hypertension (Father, Mother)      SOCIAL HISTORY:  Smoking Status: [ ] Current, [ ] Former, [ ] Never  Pack Years:  [  ] EtOH-no  [  ] IVDA-no    MEDICATIONS:  MEDICATIONS  (STANDING):  furosemide   Injectable 60 milliGRAM(s) IV Push Once    MEDICATIONS  (PRN):      Allergies    Accupril (Other)    Intolerances        Vital Signs Last 24 Hrs  T(C): 36.7 (2019 13:45), Max: 36.8 (2019 08:09)  T(F): 98.1 (2019 13:45), Max: 98.2 (2019 08:09)  HR: 82 (2019 13:45) (70 - 82)  BP: 112/50 (2019 13:45) (108/51 - 118/56)  BP(mean): --  RR: 20 (2019 13:45) (18 - 22)  SpO2: 100% (2019 13:45) (99% - 100%)        PHYSICAL EXAM:    General: somewhat lethargic  M, conjunctiva and sclera clear  H- RRR  L- CTA  Gastrointestinal: Soft, non-tender non-distended; Normal bowel sounds; No rebound or guarding  Extremities: Normal range of motion, No clubbing, cyanosis or edema  Neurological: Alert and oriented x3  Skin: Warm and dry. No obvious rash  rectal- small amount of blood in rectal vault      LABS:                        5.0    7.1   )-----------( 154      ( 2019 09:14 )             14.6     2019 12:19    133    |  102    |  55.0   ----------------------------<  197    7.2     |  19.0   |  1.27     Ca    7.7        2019 12:19    TPro  6.1    /  Alb  3.1    /  TBili  0.6    /  DBili  x      /  AST  29     /  ALT  20     /  AlkPhos  85     / Amylase x      /Lipase x      2019 12:19              RADIOLOGY & ADDITIONAL STUDIES:     < from: CT Angio Abdomen and Pelvis w/ IV Cont (19 @ 13:30) >  mpression:    Active lower GI bleed with intraluminal extravasation of contrast seen   within the redundant proximal transverse colon as described above. This   was reported to Dr. Flanagan on 2019 at 2:00 PM.    Additional findings as described.      < end of copied text >
Patient is a 63y old  Female who presents with a chief complaint of rectal bleeding (2019 07:38)      HPI: 62 y/o F PMHx of CAD, s/p CABG x1 (lima->LAD 2015), MADHAVI x2-> LCX & OM1 (2015), Nuclear stress 2018 with mildly positive ischemia in RCA (medically managed), Cardiac arrest due to hypercapnea, HFpEF (2018 EF 65-70%), RV dysfunction and severe pulmonary HTN, PAD s/p balloon angioplasty, DM2, HTN, HLD who presents to the ED with abdominal pain and bloody BMs. Patient states that she had been feeling well until 3 days ago when she began having bloody diarrhea with lower abdominal pain. Patient states she had no other symptoms, and was found upon admission with low Hgb, hyperkalemia, and CTA with active LGIB. Patient now s/p IR embolization, with colonoscopy pending. Patient states she is still having some mild abdominal pain, but denies fevers, chills, CP, SOB, LE swelling, orthopnea, N/V/D, abdominal pain, headache, dizziness, syncope, or near syncope.         PAST MEDICAL & SURGICAL HISTORY:  Herniated disc, cervical  PAD (peripheral artery disease)  Legally blind  History of peripheral vascular disease  History of retinal detachment  History of CEA (carotid endarterectomy): Right  Hyperlipidemia  Glaucoma  Hypertension  Diabetes  S/P CABG x 1  After cataract  Uveitic glaucoma of both eyes  Detached retina  Atherosclerosis of right carotid artery   delivery delivered      PREVIOUS DIAGNOSTIC TESTING:      ECHO  FINDINGS:  < from: TTE Echo Complete w/Doppler (18 @ 08:52) >  PHYSICIAN INTERPRETATION:  Left Ventricle: The left ventricular internal cavity size is normal.  Global LV systolic function was normal. Left ventricular ejection   fraction, by visual estimation, is 65 to 70%. The interventricular septum   is flattened in systole and diastole, consistent with right ventricular   pressure and volume overload. Spectral Doppler shows normal pattern of LV   diastolic filling.  Right Ventricle: The right ventricular size is moderately enlarged. RV   systolic function is moderately reduced.  Left Atrium: Severely enlarged left atrium.  Right Atrium: Moderately enlarged right atrium.  Pericardium: There is no evidence of pericardial effusion. There is a   small pleural effusion in both left and right lateral regions.  Mitral Valve: Thickening of the anterior and posterior mitral valve   leaflets. Moderate mitral valve regurgitation is seen.  Tricuspid Valve: Moderate tricuspid regurgitation is visualized.   Estimated pulmonary artery systolic pressure is 45.7 mmHg assuming a   right atrial pressure of 8 mmHg, which is consistent with mild pulmonary   hypertension.  Aortic Valve: The aortic valve is trileaflet. Sclerotic aortic valve with   normal opening. Peak Av velocity is 1.40 m/s, mean transaortic gradient   equals 3.2 mmHg. No evidence of aortic valve regurgitation is seen.  Pulmonic Valve: Structurally normal pulmonic valve, with normal leaflet   excursion. Trace pulmonic valve regurgitation.  Aorta: The aortic root is normal in size and structure.  Pulmonary Artery: The main pulmonary artery is normal in size.  Venous: The inferior vena cava was dilated, with respiratory size   variation less than 50%.       Summary:   1. Left ventricular ejection fraction, by visual estimation, is 65 to   70%.   2. Normal global left ventricular systolic function.   3. There is mild concentric left ventricular hypertrophy.   4. Moderately enlarged right ventricle.   5. Moderately reduced RV systolic function.   6. There is no evidence of pericardial effusion.   7. Moderate mitral valve regurgitation.   8. Thickening of the anterior and posterior mitral valve leaflets.   9. Moderate tricuspid regurgitation.  10. Sclerotic aortic valve with normal opening.  11. Trace pulmonic valve regurgitation.  12. Estimated pulmonary artery systolic pressure is 45.7 mmHg assuming a   right atrial pressure of 8 mmHg, which is consistent with mild pulmonary   hypertension.    [id number] Phoenix Alvarado MD, Electronically signed on 2018 at 8:26:51   PM      STRESS  FINDINGS:  < from: Nuclear Stress Test-Pharmacologic (11.27.18 @ 08:24) >  NUCLEAR FINDINGS:  Review of raw images demonstrated increased gut uptake.  Dense breast tissue noted anterolaterally.  LV cavity size  is normal.  There janae a medium sized mild defect involving  the basal to distal inferior segments on stress imaging.  Rest imaging demonstrated normalization of the inferior  defect.  Gated SPECT imaging demonstrated normal wall  motion with a post stress calculated LVEF of 67%.  ------------------------------------------------------------------------    IMPRESSIONS:  Positive study.    Single vessel, inferior ischemia involving the RCA  territory is present.  Intermediate risk scan.  Normal wall motion, LVEF 67%  ------------------------------------------------------------------------      ------------------------------------------------------------------------    Confirmed on  2018 - 12:15:31 by Em Drew   Cardiology Fellow: Galo LOUPKRISTI  ------------------------------------------------------------------------    < end of copied text >      CATHETERIZATION  FINDINGS:  < from: Cardiac Cath Lab - Adult (18 @ 15:00) >  DIAGNOSTIC IMPRESSIONS: Severe pulmonary hypertension.  Severely elevated wedge pressure.  Poor cardiac output with low mixed venous saturation.  DIAGNOSTIC RECOMMENDATIONS: Dobutamine to support RV.  Diuresis.  Consider MOLLY after CTA imaging as possible cause of renal dysfunction.  Prepared and signed by  Los Recio MD  Signed 2018 14:34:57    < end of copied text >      Allergies    Accupril (Other)    Intolerances        MEDICATIONS  (STANDING):  acetaminophen  IVPB .. 1000 milliGRAM(s) IV Intermittent once  calcium gluconate IVPB 1 Gram(s) IV Intermittent once  carvedilol 25 milliGRAM(s) Oral every 12 hours  chlorhexidine 2% Cloths 1 Application(s) Topical daily  dextrose 5%. 1000 milliLiter(s) (50 mL/Hr) IV Continuous <Continuous>  dextrose 50% Injectable 12.5 Gram(s) IV Push once  dextrose 50% Injectable 25 Gram(s) IV Push once  dextrose 50% Injectable 25 Gram(s) IV Push once  DULoxetine 60 milliGRAM(s) Oral daily  gabapentin 100 milliGRAM(s) Oral three times a day  insulin lispro (HumaLOG) corrective regimen sliding scale   SubCutaneous every 6 hours  multiple electrolytes Injection Type 1 1000 milliLiter(s) (60 mL/Hr) IV Continuous <Continuous>  pantoprazole    Tablet 40 milliGRAM(s) Oral before breakfast    MEDICATIONS  (PRN):  dextrose 40% Gel 15 Gram(s) Oral once PRN Blood Glucose LESS THAN 70 milliGRAM(s)/deciliter  glucagon  Injectable 1 milliGRAM(s) IntraMuscular once PRN Glucose LESS THAN 70 milligrams/deciliter    Home Medications:  aspirin 81 mg oral tablet, chewable: 1 tab(s) orally once a day (26 Dec 2018 11:22)  atorvastatin 80 mg oral tablet: 1 tab(s) orally once a day (at bedtime) (26 Dec 2018 11:18)  carvedilol 25 mg oral tablet: 1 tab(s) orally every 12 hours (26 Dec 2018 11:18)  clopidogrel 75 mg oral tablet: 1 tab(s) orally once a day (26 Dec 2018 11:18)  docusate sodium 100 mg oral capsule: 1 cap(s) orally 2 times a day (26 Dec 2018 11:18)  DULoxetine 60 mg oral delayed release capsule: 1 cap(s) orally once a day (26 Dec 2018 11:18)  gabapentin 100 mg oral capsule: 1 cap(s) orally 3 times a day (26 Dec 2018 11:18)  isosorbide mononitrate 60 mg oral tablet, extended release: 1 tab(s) orally once a day (in the morning) (26 Dec 2018 11:18)  magnesium oxide 400 mg (240 mg elemental magnesium) oral tablet: 1 tab(s) orally once a day (26 Dec 2018 11:18)  metFORMIN 850 mg oral tablet: orally 2 times a day (with meals) (26 Dec 2018 11:18)  pantoprazole 40 mg oral delayed release tablet: 1 tab(s) orally once a day (before a meal) (26 Dec 2018 11:18)  Torsemide 20mg PO BID     FAMILY HISTORY:  Family history of diabetes mellitus  Family history of essential hypertension      SOCIAL HISTORY:    CIGARETTES: Never smoked    ALCOHOL: Denies    REVIEW OF SYMPTOMS:   Cardiovascular:     chest pain,  dyspnea,  syncope,    palpitaitons,      dizziness,    Orthopnea,      Paroxsymal nocturnal dyspnea;    Respiratory:    Dyspnea,   cough,   ;   Genitourinary:  No dysuria, no hematuria;   Gastrointestinal:   No dark color stool, no melena, no diarrhea, no constipation, no abdominal pain;   Neurological: No headache, no dizziness, no slurred speech;    Psychiatric: No agitation, no anxiety.    ALL OTHER REVIEW OF SYSTEMS ARE NEGATIVE.      Vital Signs Last 24 Hrs  T(C): 36.7 (2019 08:00), Max: 37.2 (2019 00:00)  T(F): 98.1 (2019 08:00), Max: 99 (2019 00:00)  HR: 68 (2019 10:00) (64 - 82)  BP: 100/55 (2019 09:00) (100/55 - 131/42)  BP(mean): 71 (2019 09:00) (71 - 71)  RR: 12 (2019 10:00) (12 - 23)  SpO2: 100% (2019 10:00) (92% - 100%)      PHYSICAL EXAM:  Appearance: Normal, well nourished	  HEENT:   Normal oral mucosa, PERRL, EOMI, +right catarct 	  Lymphatic: No cervical lymphadenopathy  Cardiovascular: Normal S1 S2, No JVD, II/VI systolic murmur apex, No carotid bruits, Trace LE edema  Respiratory: Decreased BS at bases anteriorly	  Psychiatry: A & O x 3, Mood & affect appropriate  Gastrointestinal:  Soft, Non-tender, + BS, no bruits	  Skin: No rashes, No ecchymoses, No cyanosis  Neurologic: Grossly non-focal with full strength in all four extremities  Extremities: Normal range of motion, No clubbing, cyanosis, trace LE edema  Vascular: Peripheral pulses palpable 2+ bilaterally      INTERPRETATION OF TELEMETRY: NSR    ECG: NSR with inferolateral t wave inversions    LABS:                        7.5    6.0   )-----------( 99       ( 2019 05:33 )             21.5     01-14    141  |  106  |  47.0<H>  ----------------------------<  178<H>  4.8   |  25.0  |  1.01    Ca    8.1<L>      2019 05:33  Phos  4.3     01-14  Mg     2.3     -14    TPro  6.1<L>  /  Alb  3.1<L>  /  TBili  0.6  /  DBili  x   /  AST  29  /  ALT  20  /  AlkPhos  85          PT/INR - ( 2019 05:33 )   PT: 16.4 sec;   INR: 1.41 ratio         PTT - ( 2019 05:33 )  PTT:27.4 sec    I&O's Summary    2019 07:  -  2019 07:00  --------------------------------------------------------  IN: 1233 mL / OUT: 1180 mL / NET: 53 mL    2019 07:01  -  2019 10:07  --------------------------------------------------------  IN: 2860 mL / OUT: 1620 mL / NET: 1240 mL      BNP    RADIOLOGY & ADDITIONAL STUDIES:  < from: CT Angio Abdomen and Pelvis w/ IV Cont (19 @ 13:30) >  Impression:    Active lower GI bleed with intraluminal extravasation of contrast seen   within the redundant proximal transverse colon as described above. This   was reported to Dr. Flanagan on 2019 at 2:00 PM.    Additional findings as described.      < end of copied text >
Patient is a 63y old  Female who presents with a chief complaint of rectal bleeding, recurrent at home..    HPI:   Recentrly at [facility]. Noted recent onset loose dark stools, felt weak, came to ER. Denies any  issues. Multiple prior medical problems listed below.    PAST MEDICAL & SURGICAL HISTORY:  Herniated disc, cervical  PAD (peripheral artery disease)  Legally blind  History of peripheral vascular disease  History of retinal detachment  History of CEA (carotid endarterectomy): Right  Hyperlipidemia  Glaucoma  Hypertension  Diabetes  S/P CABG x 1  After cataract  Uveitic glaucoma of both eyes  Detached retina  Atherosclerosis of right carotid artery   delivery delivered      FAMILY HISTORY:  Family history of diabetes mellitus (Father, Mother)  Family history of essential hypertension (Father, Mother)  NC    Social History: Non smoker    MEDICATIONS  (STANDING):  furosemide   Injectable 60 milliGRAM(s) IV Push Once    MEDICATIONS  (PRN):   Meds reviewed    Allergies    Accupril (Other)        REVIEW OF SYSTEMS:    CONSTITUTIONAL:   feels weak  EYES: No eye pain, blind  ENMT:  No difficulty hearing, tinnitus, vertigo; No sinus or throat pain  NECK: No pain or stiffness  BREASTS: No pain, masses, or nipple discharge  RESPIRATORY: neg  CARDIOVASCULAR: No chest pain, palpitations,    GASTROINTESTINAL: No abdominal or epigastric pain. No nausea, vomiting, or hematemesis; No diarrhea or constipation. + melena   GENITOURINARY: No dysuria, frequency, hematuria, or incontinence  NEUROLOGICAL: No headaches, memory loss, loss of strength, numbness, or tremors  SKIN: neg  LYMPH NODES: No enlarged glands  ENDOCRINE: No heat or cold intolerance; No hair loss  MUSCULOSKELETAL: Chronic edema legs   PSYCHIATRIC: No depression, anxiety, mood swings, or difficulty sleeping  HEME/LYMPH: No easy bruising, or bleeding gums  ALLERY AND IMMUNOLOGIC: No hives or eczema      Vital Signs Last 24 Hrs  T(C): 36.7 (2019 13:45), Max: 36.8 (2019 08:09)  T(F): 98.1 (2019 13:45), Max: 98.2 (2019 08:09)  HR: 82 (2019 13:45) (70 - 82)  BP: 112/50 (2019 13:45) (108/51 - 118/56)  BP(mean): --  RR: 20 (2019 13:45) (18 - 22)  SpO2: 100% (2019 13:45) (99% - 100%)  Daily Height in cm: 165.1 (2019 08:09)        PHYSICAL EXAM:    GENERAL: appears chronically ill, oriented.  HEAD:  Atraumatic, Normocephalic  EYES: Blind with scar right  ENMT: No tonsillar erythema, exudates, or enlargement; Moist mucous membranes, Good dentition, No lesions  NECK: Supple, neck  veins full  NERVOUS SYSTEM:  Alert & Oriented X3, Good concentration; Motor Strength wnl upper and lower extremities;  CHEST/LUNG: Decreasewd bs bases  HEART: Regular rate and rhythm; 1/6 murmur, rubs, or gallops  ABDOMEN: Soft, Nontender, distended; Bowel sounds present, body wall and flank edema  EXTREMITIES:  + 1 edema  LYMPH: No lymphadenopathy noted  SKIN: No rashes or lesions, pale   neg    LABS:                        5.0    7.1   )-----------( 154      ( 2019 09:14 )             14.6     01-13    133<L>  |  102  |  55.0<H>  ----------------------------<  197<H>  7.2<HH>   |  19.0<L>  |  1.27    Ca    7.7<L>      2019 12:19    TPro  6.1<L>  /  Alb  3.1<L>  /  TBili  0.6  /  DBili  x   /  AST  29  /  ALT  20  /  AlkPhos  85  01-13    PT/INR - ( 2019 09:14 )   PT: 16.2 sec;   INR: 1.39 ratio         PTT - ( 2019 09:14 )  PTT:29.9 sec            RADIOLOGY & ADDITIONAL TESTS:
Vascular & Interventional Radiology Pre-Procedure Note    Procedure Name: Visceral angiography, possible embolization    HPI: 63y Female with multiple comorbidities p/w severe blood-loss anemia on admission and hematochezia, CTA showing active extravasation in proximal transverse colon. Incomplete response to PRBCs. Hyperkalemia, now reduced from admission.     Allergies: Accupril (Other)    Medications (Abx/Cardiac/Anticoagulation/Blood Products)  furosemide   Injectable: 60 milliGRAM(s) IV Push (01-13 @ 15:05)    Data:  165.1  81.6  T(C): 36.9  HR: 77  BP: 106/50  RR: 20  SpO2: 100%    Exam  Deferred    -WBC 8.0 / HgB 6.4 / Hct 18.0 / Plt 116  -Na 136 / Cl 101 / BUN 52.0 / Glucose 216  -K 5.6 / CO2 23.0 / Cr 1.27  -ALT -- / Alk Phos -- / T.Bili --  -INR1.39    Imaging: CTA reviewed by me. active extrav, proxima TV colon.     Assessment:  63y Female with multiple comorbidities p/w severe blood-loss anemia on admission and hematochezia, CTA showing active extravasation in proximal transverse colon. Incomplete response to PRBCs. Hyperkalemia, now reduced from admission. Although hemodynamically stable since admission, +CTA and incomplete response to PRBCs, in addition to Plavix+ASA on board (unclear if can stop), angio + possible embo indicated.     Plan:   -63y Female presents for visceral angiography, possible embolization.  -Risks/Benefits/alternatives explained with the patient and/or healthcare proxy and witnessed informed consent obtained.

## 2019-01-14 NOTE — PROGRESS NOTE ADULT - ASSESSMENT
HyperKalemia resolved w medical management  cont to trend   GIB s/p multiple units prbc  h/o HTN DM CKD  Agree w gentle IVF    Will follow

## 2019-01-14 NOTE — PROGRESS NOTE ADULT - SUBJECTIVE AND OBJECTIVE BOX
Patient is a 63y old  Female who presents with a chief complaint of rectal bleeding, anemia (2019 14:58)      HPI:  ACUTE CARE SURGERY SICU H&P    HPI: 63y Female admitted with rectal bleeding. Hgb 5.  Had  4 units PRBC and hemoglobin is 7.5.  As per PA, only one BM overnight.  CTA was + in transverse colon , but repeat angiogram was negative no embolization was done. Patient had 1 liter Golytely so far.  Pt is awake and altert and answering questions appropriately. Hx of hypercapnea- now on 2liters O2 and pulse ox is 100          REVIEW OF SYSTEMS:  Constitutional: No fever, weight loss or fatigue  ENMT:  No difficulty hearing, tinnitus, vertigo; No sinus or throat pain  Respiratory: No cough, wheezing, chills or hemoptysis  Cardiovascular: No chest pain, palpitations, dizziness or leg swelling  Gastrointestinal: No abdominal or epigastric pain. No nausea, vomiting or hematemesis; No diarrhea or constipation. No melena or hematochezia.  Skin: No itching, burning, rashes or lesions   Musculoskeletal: No joint pain or swelling; No muscle, back or extremity pain    PAST MEDICAL & SURGICAL HISTORY:  Herniated disc, cervical  PAD (peripheral artery disease)  Legally blind  History of peripheral vascular disease  History of retinal detachment  History of CEA (carotid endarterectomy): Right  Hyperlipidemia  Glaucoma  Hypertension  Diabetes  S/P CABG x 1  After cataract  Uveitic glaucoma of both eyes  Detached retina  Atherosclerosis of right carotid artery   delivery delivered      FAMILY HISTORY:  Family history of diabetes mellitus  Family history of essential hypertension      SOCIAL HISTORY:  Smoking Status: [ ] Current, [ ] Former, [ ] Never  Pack Years:  [  ] EtOH  [  ] IVDA    MEDICATIONS:  MEDICATIONS  (STANDING):  acetaminophen  IVPB .. 1000 milliGRAM(s) IV Intermittent once  carvedilol 25 milliGRAM(s) Oral every 12 hours  chlorhexidine 2% Cloths 1 Application(s) Topical daily  dextrose 5%. 1000 milliLiter(s) (50 mL/Hr) IV Continuous <Continuous>  dextrose 50% Injectable 12.5 Gram(s) IV Push once  dextrose 50% Injectable 25 Gram(s) IV Push once  dextrose 50% Injectable 25 Gram(s) IV Push once  DULoxetine 60 milliGRAM(s) Oral daily  gabapentin 100 milliGRAM(s) Oral three times a day  insulin lispro (HumaLOG) corrective regimen sliding scale   SubCutaneous every 6 hours  multiple electrolytes Injection Type 1 1000 milliLiter(s) (60 mL/Hr) IV Continuous <Continuous>  pantoprazole    Tablet 40 milliGRAM(s) Oral before breakfast    MEDICATIONS  (PRN):  dextrose 40% Gel 15 Gram(s) Oral once PRN Blood Glucose LESS THAN 70 milliGRAM(s)/deciliter  glucagon  Injectable 1 milliGRAM(s) IntraMuscular once PRN Glucose LESS THAN 70 milligrams/deciliter      Allergies    Accupril (Other)    Intolerances        Vital Signs Last 24 Hrs  T(C): 36.9 (2019 04:00), Max: 37.2 (2019 00:00)  T(F): 98.4 (2019 04:00), Max: 99 (2019 00:00)  HR: 71 (2019 05:00) (68 - 82)  BP: 131/42 (2019 19:45) (102/55 - 131/42)  BP(mean): --  RR: 15 (2019 05:00) (15 - 23)  SpO2: 100% (2019 05:00) (92% - 100%)    -13 @ 07:01  -  -14 @ 07:00  --------------------------------------------------------  IN: 1173 mL / OUT: 1180 mL / NET: -7 mL          PHYSICAL EXAM:    General: Well developed; well nourished; in no acute distress  HEENT: MMM, conjunctiva and sclera clear  H- RRR  L- CTA  Gastrointestinal: Soft, non-tender non-distended; Normal bowel sounds; No rebound or guarding  Extremities: Normal range of motion, No clubbing, cyanosis or edema  Neurological: Alert and oriented x3  Skin: Warm and dry. No obvious rash      LABS:                        7.5    6.0   )-----------( 99       ( 2019 05:33 )             21.5     2019 05:33    141    |  106    |  47.0   ----------------------------<  178    4.8     |  25.0   |  1.01     Ca    8.1        2019 05:33  Phos  4.3       2019 05:33  Mg     2.3       2019 05:33    TPro  6.1    /  Alb  3.1    /  TBili  0.6    /  DBili  x      /  AST  29     /  ALT  20     /  AlkPhos  85     / Amylase x      /Lipase x      2019 12:19              RADIOLOGY & ADDITIONAL STUDIES:     < from: CT Angio Abdomen and Pelvis w/ IV Cont (19 @ 13:30) >  pression:    Active lower GI bleed with intraluminal extravasation of contrast seen   within the redundant proximal transverse colon as described above. This   was reported to Dr. Flanagan on 2019 at 2:00 PM.      < end of copied text >

## 2019-01-14 NOTE — BRIEF OPERATIVE NOTE - POST-OP DX
Lower GI bleed  01/14/2019    Active  Jimenez Lucas  Polyp of ascending colon, unspecified type  01/14/2019    Active  Ward Zavala

## 2019-01-14 NOTE — PROVIDER CONTACT NOTE (OTHER) - ASSESSMENT
Patient had x2 melana moderate amount with bright red clots. VSS, no complaints, neurologically at baseline. Drank thus far 1/2 of gavilyte solution for colonoscopy and tolerating it well PO

## 2019-01-15 DIAGNOSIS — E11.9 TYPE 2 DIABETES MELLITUS WITHOUT COMPLICATIONS: ICD-10-CM

## 2019-01-15 DIAGNOSIS — I10 ESSENTIAL (PRIMARY) HYPERTENSION: ICD-10-CM

## 2019-01-15 LAB
ANION GAP SERPL CALC-SCNC: 12 MMOL/L — SIGNIFICANT CHANGE UP (ref 5–17)
ANION GAP SERPL CALC-SCNC: 9 MMOL/L — SIGNIFICANT CHANGE UP (ref 5–17)
APTT BLD: 26.3 SEC — LOW (ref 27.5–36.3)
BASOPHILS # BLD AUTO: 0 K/UL — SIGNIFICANT CHANGE UP (ref 0–0.2)
BASOPHILS NFR BLD AUTO: 0.3 % — SIGNIFICANT CHANGE UP (ref 0–2)
BUN SERPL-MCNC: 31 MG/DL — HIGH (ref 8–20)
BUN SERPL-MCNC: 32 MG/DL — HIGH (ref 8–20)
CALCIUM SERPL-MCNC: 6.9 MG/DL — LOW (ref 8.6–10.2)
CALCIUM SERPL-MCNC: 8.3 MG/DL — LOW (ref 8.6–10.2)
CHLORIDE SERPL-SCNC: 107 MMOL/L — SIGNIFICANT CHANGE UP (ref 98–107)
CHLORIDE SERPL-SCNC: 113 MMOL/L — HIGH (ref 98–107)
CO2 SERPL-SCNC: 22 MMOL/L — SIGNIFICANT CHANGE UP (ref 22–29)
CO2 SERPL-SCNC: 27 MMOL/L — SIGNIFICANT CHANGE UP (ref 22–29)
CREAT SERPL-MCNC: 0.78 MG/DL — SIGNIFICANT CHANGE UP (ref 0.5–1.3)
CREAT SERPL-MCNC: 0.93 MG/DL — SIGNIFICANT CHANGE UP (ref 0.5–1.3)
EOSINOPHIL # BLD AUTO: 0.3 K/UL — SIGNIFICANT CHANGE UP (ref 0–0.5)
EOSINOPHIL NFR BLD AUTO: 5.1 % — SIGNIFICANT CHANGE UP (ref 0–6)
GLUCOSE BLDC GLUCOMTR-MCNC: 85 MG/DL — SIGNIFICANT CHANGE UP (ref 70–99)
GLUCOSE BLDC GLUCOMTR-MCNC: 86 MG/DL — SIGNIFICANT CHANGE UP (ref 70–99)
GLUCOSE BLDC GLUCOMTR-MCNC: 86 MG/DL — SIGNIFICANT CHANGE UP (ref 70–99)
GLUCOSE BLDC GLUCOMTR-MCNC: 95 MG/DL — SIGNIFICANT CHANGE UP (ref 70–99)
GLUCOSE SERPL-MCNC: 72 MG/DL — SIGNIFICANT CHANGE UP (ref 70–115)
GLUCOSE SERPL-MCNC: 80 MG/DL — SIGNIFICANT CHANGE UP (ref 70–115)
HCT VFR BLD CALC: 25.9 % — LOW (ref 37–47)
HCT VFR BLD CALC: 26.8 % — LOW (ref 37–47)
HGB BLD-MCNC: 8.9 G/DL — LOW (ref 12–16)
HGB BLD-MCNC: 9.1 G/DL — LOW (ref 12–16)
INR BLD: 1.24 RATIO — HIGH (ref 0.88–1.16)
LYMPHOCYTES # BLD AUTO: 1.2 K/UL — SIGNIFICANT CHANGE UP (ref 1–4.8)
LYMPHOCYTES # BLD AUTO: 18.6 % — LOW (ref 20–55)
MAGNESIUM SERPL-MCNC: 1.9 MG/DL — SIGNIFICANT CHANGE UP (ref 1.6–2.6)
MAGNESIUM SERPL-MCNC: 2.3 MG/DL — SIGNIFICANT CHANGE UP (ref 1.6–2.6)
MCHC RBC-ENTMCNC: 29.9 PG — SIGNIFICANT CHANGE UP (ref 27–31)
MCHC RBC-ENTMCNC: 29.9 PG — SIGNIFICANT CHANGE UP (ref 27–31)
MCHC RBC-ENTMCNC: 34 G/DL — SIGNIFICANT CHANGE UP (ref 32–36)
MCHC RBC-ENTMCNC: 34.4 G/DL — SIGNIFICANT CHANGE UP (ref 32–36)
MCV RBC AUTO: 86.9 FL — SIGNIFICANT CHANGE UP (ref 81–99)
MCV RBC AUTO: 88.2 FL — SIGNIFICANT CHANGE UP (ref 81–99)
MONOCYTES # BLD AUTO: 0.5 K/UL — SIGNIFICANT CHANGE UP (ref 0–0.8)
MONOCYTES NFR BLD AUTO: 8.3 % — SIGNIFICANT CHANGE UP (ref 3–10)
NEUTROPHILS # BLD AUTO: 4.4 K/UL — SIGNIFICANT CHANGE UP (ref 1.8–8)
NEUTROPHILS NFR BLD AUTO: 67.5 % — SIGNIFICANT CHANGE UP (ref 37–73)
PHOSPHATE SERPL-MCNC: 3.2 MG/DL — SIGNIFICANT CHANGE UP (ref 2.4–4.7)
PHOSPHATE SERPL-MCNC: 3.5 MG/DL — SIGNIFICANT CHANGE UP (ref 2.4–4.7)
PLATELET # BLD AUTO: 119 K/UL — LOW (ref 150–400)
PLATELET # BLD AUTO: 123 K/UL — LOW (ref 150–400)
POTASSIUM SERPL-MCNC: 3.3 MMOL/L — LOW (ref 3.5–5.3)
POTASSIUM SERPL-MCNC: 4.8 MMOL/L — SIGNIFICANT CHANGE UP (ref 3.5–5.3)
POTASSIUM SERPL-SCNC: 3.3 MMOL/L — LOW (ref 3.5–5.3)
POTASSIUM SERPL-SCNC: 4.8 MMOL/L — SIGNIFICANT CHANGE UP (ref 3.5–5.3)
PROTHROM AB SERPL-ACNC: 14.4 SEC — HIGH (ref 10–12.9)
RBC # BLD: 2.98 M/UL — LOW (ref 4.4–5.2)
RBC # BLD: 3.04 M/UL — LOW (ref 4.4–5.2)
RBC # FLD: 17.2 % — HIGH (ref 11–15.6)
RBC # FLD: 17.5 % — HIGH (ref 11–15.6)
SODIUM SERPL-SCNC: 143 MMOL/L — SIGNIFICANT CHANGE UP (ref 135–145)
SODIUM SERPL-SCNC: 147 MMOL/L — HIGH (ref 135–145)
WBC # BLD: 6.5 K/UL — SIGNIFICANT CHANGE UP (ref 4.8–10.8)
WBC # BLD: 6.6 K/UL — SIGNIFICANT CHANGE UP (ref 4.8–10.8)
WBC # FLD AUTO: 6.5 K/UL — SIGNIFICANT CHANGE UP (ref 4.8–10.8)
WBC # FLD AUTO: 6.6 K/UL — SIGNIFICANT CHANGE UP (ref 4.8–10.8)

## 2019-01-15 PROCEDURE — 43235 EGD DIAGNOSTIC BRUSH WASH: CPT

## 2019-01-15 PROCEDURE — 99231 SBSQ HOSP IP/OBS SF/LOW 25: CPT

## 2019-01-15 RX ORDER — DEXTROSE 50 % IN WATER 50 %
25 SYRINGE (ML) INTRAVENOUS ONCE
Qty: 0 | Refills: 0 | Status: DISCONTINUED | OUTPATIENT
Start: 2019-01-15 | End: 2019-01-18

## 2019-01-15 RX ORDER — DEXTROSE 50 % IN WATER 50 %
12.5 SYRINGE (ML) INTRAVENOUS ONCE
Qty: 0 | Refills: 0 | Status: DISCONTINUED | OUTPATIENT
Start: 2019-01-15 | End: 2019-01-18

## 2019-01-15 RX ORDER — CALCIUM GLUCONATE 100 MG/ML
1 VIAL (ML) INTRAVENOUS ONCE
Qty: 0 | Refills: 0 | Status: COMPLETED | OUTPATIENT
Start: 2019-01-15 | End: 2019-01-15

## 2019-01-15 RX ORDER — HYDROMORPHONE HYDROCHLORIDE 2 MG/ML
1 INJECTION INTRAMUSCULAR; INTRAVENOUS; SUBCUTANEOUS ONCE
Qty: 0 | Refills: 0 | Status: DISCONTINUED | OUTPATIENT
Start: 2019-01-15 | End: 2019-01-14

## 2019-01-15 RX ORDER — DEXTROSE 50 % IN WATER 50 %
15 SYRINGE (ML) INTRAVENOUS ONCE
Qty: 0 | Refills: 0 | Status: DISCONTINUED | OUTPATIENT
Start: 2019-01-15 | End: 2019-01-18

## 2019-01-15 RX ORDER — INSULIN LISPRO 100/ML
VIAL (ML) SUBCUTANEOUS
Qty: 0 | Refills: 0 | Status: DISCONTINUED | OUTPATIENT
Start: 2019-01-15 | End: 2019-01-18

## 2019-01-15 RX ORDER — GLUCAGON INJECTION, SOLUTION 0.5 MG/.1ML
1 INJECTION, SOLUTION SUBCUTANEOUS ONCE
Qty: 0 | Refills: 0 | Status: DISCONTINUED | OUTPATIENT
Start: 2019-01-15 | End: 2019-01-18

## 2019-01-15 RX ORDER — HYDROMORPHONE HYDROCHLORIDE 2 MG/ML
0.5 INJECTION INTRAMUSCULAR; INTRAVENOUS; SUBCUTANEOUS ONCE
Qty: 0 | Refills: 0 | Status: DISCONTINUED | OUTPATIENT
Start: 2019-01-15 | End: 2019-01-15

## 2019-01-15 RX ORDER — POTASSIUM CHLORIDE 20 MEQ
20 PACKET (EA) ORAL
Qty: 0 | Refills: 0 | Status: COMPLETED | OUTPATIENT
Start: 2019-01-15 | End: 2019-01-15

## 2019-01-15 RX ORDER — ACETAMINOPHEN 500 MG
650 TABLET ORAL EVERY 6 HOURS
Qty: 0 | Refills: 0 | Status: DISCONTINUED | OUTPATIENT
Start: 2019-01-15 | End: 2019-01-18

## 2019-01-15 RX ORDER — SODIUM CHLORIDE 9 MG/ML
1000 INJECTION, SOLUTION INTRAVENOUS
Qty: 0 | Refills: 0 | Status: DISCONTINUED | OUTPATIENT
Start: 2019-01-15 | End: 2019-01-16

## 2019-01-15 RX ADMIN — HYDROMORPHONE HYDROCHLORIDE 1 MILLIGRAM(S): 2 INJECTION INTRAMUSCULAR; INTRAVENOUS; SUBCUTANEOUS at 00:00

## 2019-01-15 RX ADMIN — CARVEDILOL PHOSPHATE 25 MILLIGRAM(S): 80 CAPSULE, EXTENDED RELEASE ORAL at 05:30

## 2019-01-15 RX ADMIN — HYDROMORPHONE HYDROCHLORIDE 0.5 MILLIGRAM(S): 2 INJECTION INTRAMUSCULAR; INTRAVENOUS; SUBCUTANEOUS at 08:00

## 2019-01-15 RX ADMIN — GABAPENTIN 100 MILLIGRAM(S): 400 CAPSULE ORAL at 05:30

## 2019-01-15 RX ADMIN — PANTOPRAZOLE SODIUM 40 MILLIGRAM(S): 20 TABLET, DELAYED RELEASE ORAL at 06:55

## 2019-01-15 RX ADMIN — CHLORHEXIDINE GLUCONATE 1 APPLICATION(S): 213 SOLUTION TOPICAL at 14:24

## 2019-01-15 RX ADMIN — Medication 50 MILLIEQUIVALENT(S): at 06:51

## 2019-01-15 RX ADMIN — HYDROMORPHONE HYDROCHLORIDE 0.5 MILLIGRAM(S): 2 INJECTION INTRAMUSCULAR; INTRAVENOUS; SUBCUTANEOUS at 07:41

## 2019-01-15 RX ADMIN — Medication 200 GRAM(S): at 16:40

## 2019-01-15 RX ADMIN — CARVEDILOL PHOSPHATE 25 MILLIGRAM(S): 80 CAPSULE, EXTENDED RELEASE ORAL at 17:33

## 2019-01-15 RX ADMIN — Medication 50 MILLIEQUIVALENT(S): at 08:59

## 2019-01-15 RX ADMIN — Medication 650 MILLIGRAM(S): at 17:00

## 2019-01-15 RX ADMIN — Medication 50 MILLIEQUIVALENT(S): at 10:46

## 2019-01-15 RX ADMIN — Medication 650 MILLIGRAM(S): at 16:32

## 2019-01-15 NOTE — DIETITIAN INITIAL EVALUATION ADULT. - OTHER INFO
Pt admitted with lower GI bleed, s/p colonoscopy, +polyp of ascending colon, s/p polypectomy. Pt remains NPO, pending EGD this morning. Pt reports intentionally losing 60# over past year by following strict low Na+, low sugar diet.

## 2019-01-15 NOTE — DIETITIAN INITIAL EVALUATION ADULT. - PERTINENT LABORATORY DATA
01-15 Na147 mmol/L<H> Glu 72 mg/dL K+ 3.3 mmol/L<L> Cr  0.78 mg/dL BUN 32.0 mg/dL<H> Phos 3.2 mg/dL Alb n/a   PAB n/a

## 2019-01-15 NOTE — PROGRESS NOTE ADULT - ATTENDING COMMENTS
A/p: 63y F LGIB, found in proximal transverse colon, s/p IR angiography without signs of extrav or active bleeding, Csope 1/14 negative for LGIB, nontoxic, HD nl    Neuro: Adequate pain control.    Card: Continue hemodynamic monitoring via arterial line. F/u repeat cbc for adequate response to PRBC    Pulm: Cont NC, Pulmonary toileting, IS    GI: Plan for EGD today    : Russo in place, replete labs prn.     Endo: ISS    Hem/DVT: SCDs, HSQ    ID: None    Lines/Tubes: RIJ cordis, L rad jaswinder    Skin: R groin checks post op    Dispo: SICU  H/H monitoring, HD and H&H stable.  No further bleeding noted.      EGD today.      Is complaining of mild LUQ abd pain with radiation into back and shoulder.  Abd soft and NT.  Will need to observe closely for potential complication from colonoscopy.  Keep in SICU for close monitoring for recurrent bleeding.

## 2019-01-15 NOTE — PROGRESS NOTE ADULT - ASSESSMENT
A/p: 63y F admitted for LGIB, found in proximal transverse colon, s/p IR angiography without signs of extrav or active bleeding, Csope negative for bleeding source, EGD today    Neuro: Adequate pain control.    Card: Continue hemodynamic monitoring via arterial line. F/u repeat cbc for adequate response to PRBC    Pulm: Cont NC, Pulmonary toileting, IS    GI: EGD today    : Russo in place, replete labs prn.    Endo: ISS    Hem/DVT: SCDs, will restart HSQ when bleeding risk decreases    ID: None    Lines/Tubes: RIJ cordis, L rad jaswinder    Skin: No issue      Dispo: SICU

## 2019-01-15 NOTE — PROGRESS NOTE ADULT - SUBJECTIVE AND OBJECTIVE BOX
NEPHROLOGY INTERVAL HPI/OVERNIGHT EVENTS:  pt comfortable when seen earlier  no acute distress noted    MEDICATIONS  (STANDING):  carvedilol 25 milliGRAM(s) Oral every 12 hours  chlorhexidine 2% Cloths 1 Application(s) Topical daily  dextrose 5%. 1000 milliLiter(s) (50 mL/Hr) IV Continuous <Continuous>  dextrose 50% Injectable 12.5 Gram(s) IV Push once  dextrose 50% Injectable 25 Gram(s) IV Push once  dextrose 50% Injectable 25 Gram(s) IV Push once  DULoxetine 60 milliGRAM(s) Oral daily  gabapentin 100 milliGRAM(s) Oral three times a day  insulin lispro (HumaLOG) corrective regimen sliding scale   SubCutaneous every 6 hours  multiple electrolytes Injection Type 1 1000 milliLiter(s) (60 mL/Hr) IV Continuous <Continuous>  pantoprazole    Tablet 40 milliGRAM(s) Oral before breakfast    MEDICATIONS  (PRN):  dextrose 40% Gel 15 Gram(s) Oral once PRN Blood Glucose LESS THAN 70 milliGRAM(s)/deciliter  glucagon  Injectable 1 milliGRAM(s) IntraMuscular once PRN Glucose LESS THAN 70 milligrams/deciliter      Allergies    Accupril (Other)          Vital Signs Last 24 Hrs  T(C): 36.8 (15 Noah 2019 12:00), Max: 37 (15 Noah 2019 00:00)  T(F): 98.2 (15 Noah 2019 12:00), Max: 98.6 (15 Noah 2019 00:00)  HR: 68 (15 Noah 2019 14:00) (62 - 72)  BP: --  BP(mean): --  RR: 13 (15 Noah 2019 14:00) (12 - 21)  SpO2: 93% (15 Noah 2019 14:00) (93% - 100%)    PHYSICAL EXAM:  NAD	  Cardiovascular: Normal S1 S2, No rub  Respiratory: Decreased BS at bases but clear	  Neuro: A & O x 3; non focal  Gastrointestinal:  Soft, Non-tender, + BS	  Extremities: trace LE edema    LABS:                        9.1    6.6   )-----------( 123      ( 15 Noah 2019 14:22 )             26.8     01-15    147<H>  |  113<H>  |  32.0<H>  ----------------------------<  72  3.3<L>   |  22.0  |  0.78    Ca    6.9<L>      15 Noah 2019 04:31  Phos  3.2     01-15  Mg     1.9     01-15  Albumin, Serum: 3.1 g/dL (01.13.19 @ 12:19)          PT/INR - ( 15 Noah 2019 04:31 )   PT: 14.4 sec;   INR: 1.24 ratio         PTT - ( 15 Noah 2019 04:31 )  PTT:26.3 sec    Magnesium, Serum: 1.9 mg/dL (01-15 @ 04:31)  Phosphorus Level, Serum: 3.2 mg/dL (01-15 @ 04:31)  Magnesium, Serum: 2.3 mg/dL (01-14 @ 20:52)  Phosphorus Level, Serum: 3.9 mg/dL (01-14 @ 20:52)      RADIOLOGY & ADDITIONAL TESTS:

## 2019-01-15 NOTE — PROGRESS NOTE ADULT - SUBJECTIVE AND OBJECTIVE BOX
HISTORY  63y F LGIB, found in proximal transverse colon, s/p IR angiography without signs of extrav or active bleeding, Csope 1/14 negative for LGIB, nontoxic, HD nl    24 HOUR EVENTS: No acute events overnight, no bleeding, remained NSR, normotensive, stable Hb 8.9/9.1    SUBJECTIVE/ROS:  [X ] A ten-point review of systems was otherwise negative except as noted.  [ ] Due to altered mental status/intubation, subjective information were not able to be obtained from the patient. History was obtained, to the extent possible, from review of the chart and collateral sources of information.      NEURO  RASS:  0   GCS: 15    CAM ICU: neg  Exam: nonfocal  Meds: DULoxetine 60 milliGRAM(s) Oral daily  gabapentin 100 milliGRAM(s) Oral three times a day    [x] Adequacy of sedation and pain control has been assessed and adjusted      RESPIRATORY  RR: 13 (01-15-19 @ 08:00) (12 - 23)  SpO2: 100% (01-15-19 @ 08:00) (94% - 100%)  Wt(kg): --  Exam: unlabored, clear to auscultation bilaterally  Mechanical Ventilation:   ABG - ( 14 Jan 2019 08:47 )  pH: 7.43  /  pCO2: 41    /  pO2: 124   / HCO3: 27    / Base Excess: 2.7   /  SaO2: 100     Lactate: x                [ ] Extubation Readiness Assessed  Meds:       CARDIOVASCULAR  HR: 64 (01-15-19 @ 08:00) (62 - 77)  BP: 100/55 (01-14-19 @ 09:00) (100/55 - 100/55)  BP(mean): 71 (01-14-19 @ 09:00) (71 - 71)  ABP: 128/53 (01-15-19 @ 08:00) (104/51 - 159/58)  ABP(mean): 77 (01-15-19 @ 08:00) (60 - 91)  Wt(kg): --  CVP(cm H2O): --      Exam: RRR  Cardiac Rhythm: RRR  Perfusion     [X ]Adequate   [ ]Inadequate  Mentation   [X ]Normal       [ ]Reduced  Extremities  [X ]Warm         [ ]Cool  Volume Status [ ]Hypervolemic [ ]Euvolemic [ ]Hypovolemic  Meds: carvedilol 25 milliGRAM(s) Oral every 12 hours        GI/NUTRITION  Exam: soft, ND, NTTP  Diet: NPO  Meds: pantoprazole    Tablet 40 milliGRAM(s) Oral before breakfast      GENITOURINARY  I&O's Detail    01-14 @ 07:01  -  01-15 @ 07:00  --------------------------------------------------------  IN:    IV PiggyBack: 100 mL    multiple electrolytes Injection Type 1: 1380 mL    Oral Fluid: 1460 mL    Packed Red Blood Cells: 661 mL    Plasma: 401 mL    Platelets - Single Donor: 338 mL  Total IN: 4340 mL    OUT:    Indwelling Catheter - Urethral: 1230 mL    Stool: 1850 mL  Total OUT: 3080 mL    Total NET: 1260 mL      01-15 @ 07:01  -  01-15 @ 08:31  --------------------------------------------------------  IN:    IV PiggyBack: 100 mL    multiple electrolytes Injection Type 1: 60 mL  Total IN: 160 mL    OUT:    Indwelling Catheter - Urethral: 75 mL  Total OUT: 75 mL    Total NET: 85 mL          01-15    147<H>  |  113<H>  |  32.0<H>  ----------------------------<  72  3.3<L>   |  22.0  |  0.78    Ca    6.9<L>      15 Noah 2019 04:31  Phos  3.2     01-15  Mg     1.9     01-15    TPro  6.1<L>  /  Alb  3.1<L>  /  TBili  0.6  /  DBili  x   /  AST  29  /  ALT  20  /  AlkPhos  85  01-13    [ ] Russo catheter, indication: N/A  Meds: dextrose 5%. 1000 milliLiter(s) IV Continuous <Continuous>  multiple electrolytes Injection Type 1 1000 milliLiter(s) IV Continuous <Continuous>  potassium chloride  20 mEq/100 mL IVPB 20 milliEquivalent(s) IV Intermittent every 2 hours        HEMATOLOGIC  Meds:   [x] VTE Prophylaxis                        8.9    6.5   )-----------( 119      ( 15 Noah 2019 04:31 )             25.9     PT/INR - ( 15 Noah 2019 04:31 )   PT: 14.4 sec;   INR: 1.24 ratio         PTT - ( 15 Noah 2019 04:31 )  PTT:26.3 sec  Transfusion     [ ] PRBC   [ ] Platelets   [ ] FFP   [ ] Cryoprecipitate      INFECTIOUS DISEASES  T(C): 36.7 (01-15-19 @ 08:00), Max: 37 (01-15-19 @ 00:00)  Wt(kg): --  WBC Count: 6.5 K/uL (01-15 @ 04:31)  WBC Count: 6.2 K/uL (01-14 @ 20:52)  WBC Count: 6.2 K/uL (01-14 @ 12:40)    Recent Cultures:    Meds:       ENDOCRINE  Capillary Blood Glucose    Meds: dextrose 40% Gel 15 Gram(s) Oral once PRN  dextrose 50% Injectable 12.5 Gram(s) IV Push once  dextrose 50% Injectable 25 Gram(s) IV Push once  dextrose 50% Injectable 25 Gram(s) IV Push once  glucagon  Injectable 1 milliGRAM(s) IntraMuscular once PRN  insulin lispro (HumaLOG) corrective regimen sliding scale   SubCutaneous every 6 hours        ACCESS DEVICES:  [X ] Peripheral IV  [X ] Central Venous Line	[ ] R	[ ] L	[ ] IJ	[ ] Fem	[ ] SC	Placed:   [X ] Arterial Line		[ ] R	[ ] L	[ ] Fem	[ ] Rad	[ ] Ax	Placed:   [ ] PICC:					[ ] Mediport  [ ] Urinary Catheter, Date Placed:   [ ] Necessity of urinary, arterial, and venous catheters discussed    OTHER MEDICATIONS:  chlorhexidine 2% Cloths 1 Application(s) Topical daily      CODE STATUS: FULL    IMAGING:

## 2019-01-15 NOTE — CDI QUERY NOTE - NSCDIOTHERTXTBX_GEN_ALL_CORE_HH
Patient is documented with anemia due to blood loss.  Please clarify the type of anemia to capture the highest level of specificity known.      Documentation:  a/w GIB  IR radiology states "severe blood loss anemia"  s/p 6 units PRBC, 3 units plasma, 1 unit platelets    Hemoglobin levels:  Hemoglobin: 8.9 g/dL <L> [12.0 - 16.0] (01-15-19)  Hemoglobin: 9.1 g/dL <L> [12.0 - 16.0] (01-14-19)  Hemoglobin: 9.0 g/dL <L> [12.0 - 16.0] (01-14-19)  Hemoglobin: 7.5 g/dL <L> [12.0 - 16.0] (01-14-19)  Hemoglobin: 6.8 g/dL <LL> [12.0 - 16.0] (01-13-19)  Hemoglobin: 6.4 g/dL <LL> [12.0 - 16.0] (01-13-19)  Hemoglobin: 5.0 g/dL <LL> [12.0 - 16.0] (01-13-19)          Can the type of anemia be clarified?    1.) Acute post-hemorraghic anemia  2.) Acute blood loss anemia  3.) Other (please specify)  4.) Not clinically significant      Thank you.

## 2019-01-15 NOTE — PROGRESS NOTE ADULT - ASSESSMENT
DM with DM nephropathy + proteinuria  ARF (in past) has resolved  - consider restarting ARB if K+ stable for long term renoprotective effects  - avoid potential nephrotoxins  - trend labs    Hypocalcemia: Mg+ ok  - check iPTH and Vit D  - supplement Ca+    Anemia: +GI loss  - GI w/u in progress  - check Fe stores  - add QUENTIN

## 2019-01-16 LAB
ALBUMIN SERPL ELPH-MCNC: 2.9 G/DL — LOW (ref 3.3–5.2)
ALP SERPL-CCNC: 113 U/L — SIGNIFICANT CHANGE UP (ref 40–120)
ALT FLD-CCNC: 34 U/L — HIGH
ANION GAP SERPL CALC-SCNC: 8 MMOL/L — SIGNIFICANT CHANGE UP (ref 5–17)
APTT BLD: 28.2 SEC — SIGNIFICANT CHANGE UP (ref 27.5–36.3)
AST SERPL-CCNC: 37 U/L — HIGH
BASOPHILS # BLD AUTO: 0 K/UL — SIGNIFICANT CHANGE UP (ref 0–0.2)
BASOPHILS NFR BLD AUTO: 0.2 % — SIGNIFICANT CHANGE UP (ref 0–2)
BILIRUB SERPL-MCNC: 1 MG/DL — SIGNIFICANT CHANGE UP (ref 0.4–2)
BUN SERPL-MCNC: 33 MG/DL — HIGH (ref 8–20)
CALCIUM SERPL-MCNC: 8 MG/DL — LOW (ref 8.6–10.2)
CHLORIDE SERPL-SCNC: 105 MMOL/L — SIGNIFICANT CHANGE UP (ref 98–107)
CO2 SERPL-SCNC: 27 MMOL/L — SIGNIFICANT CHANGE UP (ref 22–29)
CREAT SERPL-MCNC: 0.97 MG/DL — SIGNIFICANT CHANGE UP (ref 0.5–1.3)
EOSINOPHIL # BLD AUTO: 0.2 K/UL — SIGNIFICANT CHANGE UP (ref 0–0.5)
EOSINOPHIL NFR BLD AUTO: 3.3 % — SIGNIFICANT CHANGE UP (ref 0–6)
FERRITIN SERPL-MCNC: 200 NG/ML — HIGH (ref 15–150)
GLUCOSE BLDC GLUCOMTR-MCNC: 118 MG/DL — HIGH (ref 70–99)
GLUCOSE BLDC GLUCOMTR-MCNC: 137 MG/DL — HIGH (ref 70–99)
GLUCOSE BLDC GLUCOMTR-MCNC: 165 MG/DL — HIGH (ref 70–99)
GLUCOSE SERPL-MCNC: 156 MG/DL — HIGH (ref 70–115)
HCT VFR BLD CALC: 25.6 % — LOW (ref 37–47)
HGB BLD-MCNC: 8.5 G/DL — LOW (ref 12–16)
INR BLD: 1.16 RATIO — SIGNIFICANT CHANGE UP (ref 0.88–1.16)
IRON SATN MFR SERPL: 11 % — LOW (ref 14–50)
IRON SATN MFR SERPL: 30 UG/DL — LOW (ref 37–145)
LYMPHOCYTES # BLD AUTO: 0.8 K/UL — LOW (ref 1–4.8)
LYMPHOCYTES # BLD AUTO: 15.6 % — LOW (ref 20–55)
MAGNESIUM SERPL-MCNC: 2.4 MG/DL — SIGNIFICANT CHANGE UP (ref 1.6–2.6)
MCHC RBC-ENTMCNC: 29.8 PG — SIGNIFICANT CHANGE UP (ref 27–31)
MCHC RBC-ENTMCNC: 33.2 G/DL — SIGNIFICANT CHANGE UP (ref 32–36)
MCV RBC AUTO: 89.8 FL — SIGNIFICANT CHANGE UP (ref 81–99)
MONOCYTES # BLD AUTO: 0.4 K/UL — SIGNIFICANT CHANGE UP (ref 0–0.8)
MONOCYTES NFR BLD AUTO: 7.6 % — SIGNIFICANT CHANGE UP (ref 3–10)
NEUTROPHILS # BLD AUTO: 3.9 K/UL — SIGNIFICANT CHANGE UP (ref 1.8–8)
NEUTROPHILS NFR BLD AUTO: 73.1 % — HIGH (ref 37–73)
PHOSPHATE SERPL-MCNC: 3.7 MG/DL — SIGNIFICANT CHANGE UP (ref 2.4–4.7)
PLATELET # BLD AUTO: 121 K/UL — LOW (ref 150–400)
POTASSIUM SERPL-MCNC: 4.3 MMOL/L — SIGNIFICANT CHANGE UP (ref 3.5–5.3)
POTASSIUM SERPL-SCNC: 4.3 MMOL/L — SIGNIFICANT CHANGE UP (ref 3.5–5.3)
PROT SERPL-MCNC: 6 G/DL — LOW (ref 6.6–8.7)
PROTHROM AB SERPL-ACNC: 13.4 SEC — HIGH (ref 10–12.9)
RBC # BLD: 2.85 M/UL — LOW (ref 4.4–5.2)
RBC # FLD: 17.4 % — HIGH (ref 11–15.6)
SODIUM SERPL-SCNC: 140 MMOL/L — SIGNIFICANT CHANGE UP (ref 135–145)
SURGICAL PATHOLOGY STUDY: SIGNIFICANT CHANGE UP
TIBC SERPL-MCNC: 285 UG/DL — SIGNIFICANT CHANGE UP (ref 220–430)
TRANSFERRIN SERPL-MCNC: 199 MG/DL — SIGNIFICANT CHANGE UP (ref 192–382)
WBC # BLD: 5.4 K/UL — SIGNIFICANT CHANGE UP (ref 4.8–10.8)
WBC # FLD AUTO: 5.4 K/UL — SIGNIFICANT CHANGE UP (ref 4.8–10.8)

## 2019-01-16 PROCEDURE — 99233 SBSQ HOSP IP/OBS HIGH 50: CPT

## 2019-01-16 PROCEDURE — 74177 CT ABD & PELVIS W/CONTRAST: CPT | Mod: 26

## 2019-01-16 PROCEDURE — 99231 SBSQ HOSP IP/OBS SF/LOW 25: CPT

## 2019-01-16 RX ORDER — IRON SUCROSE 20 MG/ML
250 INJECTION, SOLUTION INTRAVENOUS
Qty: 0 | Refills: 0 | Status: DISCONTINUED | OUTPATIENT
Start: 2019-01-16 | End: 2019-01-18

## 2019-01-16 RX ORDER — LOSARTAN POTASSIUM 100 MG/1
50 TABLET, FILM COATED ORAL DAILY
Qty: 0 | Refills: 0 | Status: DISCONTINUED | OUTPATIENT
Start: 2019-01-16 | End: 2019-01-18

## 2019-01-16 RX ORDER — HEPARIN SODIUM 5000 [USP'U]/ML
5000 INJECTION INTRAVENOUS; SUBCUTANEOUS EVERY 8 HOURS
Qty: 0 | Refills: 0 | Status: DISCONTINUED | OUTPATIENT
Start: 2019-01-16 | End: 2019-01-18

## 2019-01-16 RX ORDER — DULOXETINE HYDROCHLORIDE 30 MG/1
60 CAPSULE, DELAYED RELEASE ORAL DAILY
Qty: 0 | Refills: 0 | Status: DISCONTINUED | OUTPATIENT
Start: 2019-01-16 | End: 2019-01-18

## 2019-01-16 RX ORDER — ACETAMINOPHEN 500 MG
1000 TABLET ORAL ONCE
Qty: 0 | Refills: 0 | Status: DISCONTINUED | OUTPATIENT
Start: 2019-01-16 | End: 2019-01-16

## 2019-01-16 RX ORDER — SODIUM CHLORIDE 9 MG/ML
1000 INJECTION, SOLUTION INTRAVENOUS
Qty: 0 | Refills: 0 | Status: DISCONTINUED | OUTPATIENT
Start: 2019-01-16 | End: 2019-01-17

## 2019-01-16 RX ORDER — LANOLIN ALCOHOL/MO/W.PET/CERES
3 CREAM (GRAM) TOPICAL AT BEDTIME
Qty: 0 | Refills: 0 | Status: DISCONTINUED | OUTPATIENT
Start: 2019-01-16 | End: 2019-01-18

## 2019-01-16 RX ORDER — GABAPENTIN 400 MG/1
100 CAPSULE ORAL EVERY 8 HOURS
Qty: 0 | Refills: 0 | Status: DISCONTINUED | OUTPATIENT
Start: 2019-01-16 | End: 2019-01-18

## 2019-01-16 RX ORDER — DOCUSATE SODIUM 100 MG
100 CAPSULE ORAL
Qty: 0 | Refills: 0 | Status: DISCONTINUED | OUTPATIENT
Start: 2019-01-16 | End: 2019-01-18

## 2019-01-16 RX ORDER — ERYTHROPOIETIN 10000 [IU]/ML
20000 INJECTION, SOLUTION INTRAVENOUS; SUBCUTANEOUS
Qty: 0 | Refills: 0 | Status: DISCONTINUED | OUTPATIENT
Start: 2019-01-16 | End: 2019-01-18

## 2019-01-16 RX ORDER — ATORVASTATIN CALCIUM 80 MG/1
80 TABLET, FILM COATED ORAL AT BEDTIME
Qty: 0 | Refills: 0 | Status: DISCONTINUED | OUTPATIENT
Start: 2019-01-16 | End: 2019-01-18

## 2019-01-16 RX ADMIN — PANTOPRAZOLE SODIUM 40 MILLIGRAM(S): 20 TABLET, DELAYED RELEASE ORAL at 07:40

## 2019-01-16 RX ADMIN — HEPARIN SODIUM 5000 UNIT(S): 5000 INJECTION INTRAVENOUS; SUBCUTANEOUS at 21:45

## 2019-01-16 RX ADMIN — SODIUM CHLORIDE 75 MILLILITER(S): 9 INJECTION, SOLUTION INTRAVENOUS at 07:41

## 2019-01-16 RX ADMIN — Medication 650 MILLIGRAM(S): at 21:46

## 2019-01-16 RX ADMIN — IRON SUCROSE 112.5 MILLIGRAM(S): 20 INJECTION, SOLUTION INTRAVENOUS at 16:31

## 2019-01-16 RX ADMIN — ATORVASTATIN CALCIUM 80 MILLIGRAM(S): 80 TABLET, FILM COATED ORAL at 21:45

## 2019-01-16 RX ADMIN — Medication 650 MILLIGRAM(S): at 09:42

## 2019-01-16 RX ADMIN — CARVEDILOL PHOSPHATE 25 MILLIGRAM(S): 80 CAPSULE, EXTENDED RELEASE ORAL at 07:40

## 2019-01-16 RX ADMIN — CHLORHEXIDINE GLUCONATE 1 APPLICATION(S): 213 SOLUTION TOPICAL at 11:15

## 2019-01-16 RX ADMIN — Medication 650 MILLIGRAM(S): at 20:32

## 2019-01-16 RX ADMIN — Medication 20 MILLIGRAM(S): at 16:32

## 2019-01-16 RX ADMIN — ERYTHROPOIETIN 20000 UNIT(S): 10000 INJECTION, SOLUTION INTRAVENOUS; SUBCUTANEOUS at 21:45

## 2019-01-16 RX ADMIN — HEPARIN SODIUM 5000 UNIT(S): 5000 INJECTION INTRAVENOUS; SUBCUTANEOUS at 16:31

## 2019-01-16 RX ADMIN — CARVEDILOL PHOSPHATE 25 MILLIGRAM(S): 80 CAPSULE, EXTENDED RELEASE ORAL at 17:22

## 2019-01-16 RX ADMIN — Medication 2: at 07:40

## 2019-01-16 RX ADMIN — Medication 650 MILLIGRAM(S): at 08:44

## 2019-01-16 RX ADMIN — LOSARTAN POTASSIUM 50 MILLIGRAM(S): 100 TABLET, FILM COATED ORAL at 16:31

## 2019-01-16 NOTE — PROGRESS NOTE ADULT - ASSESSMENT
DM with DM nephropathy + proteinuria  ARF (in past) has resolved  - will consider restarting ARB if K+ remains stable  - avoid potential nephrotoxins  - follow labs    Hypocalcemia: Mg+ ok  - await iPTH and Vit D  - supplement Ca+ if necessary    Anemia: +GI loss  Low Fe stores  - GI w/u in progress  - add IV Venofer  - added QUENTIN

## 2019-01-16 NOTE — PROGRESS NOTE ADULT - SUBJECTIVE AND OBJECTIVE BOX
24h Events:  EGD negative for acute bleeding or stigmata of bleeding. CT Enterography ordered. No further evidence of GIB.    Subjective:  Pt offers no new complaints but does continue to complain of left chest/LUQ.     ICU Vital Signs Last 24 Hrs  T(C): 37.2 (16 Jan 2019 08:00), Max: 37.7 (16 Jan 2019 00:00)  T(F): 98.9 (16 Jan 2019 08:00), Max: 99.9 (16 Jan 2019 00:00)  HR: 62 (16 Jan 2019 10:00) (62 - 73)  BP: --  BP(mean): --  ABP: 123/51 (16 Jan 2019 10:00) (106/44 - 173/68)  ABP(mean): 73 (16 Jan 2019 10:00) (65 - 101)  RR: 24 (16 Jan 2019 10:00) (6 - 27)  SpO2: 100% (16 Jan 2019 10:00) (93% - 100%)      I&O's Detail    15 Noah 2019 07:01  -  16 Jan 2019 07:00  --------------------------------------------------------  IN:    IV PiggyBack: 350 mL    multiple electrolytes Injection Type 1multiple electrolytes Injection Type 1: 1470 mL    Oral Fluid: 240 mL  Total IN: 2060 mL    OUT:    Indwelling Catheter - Urethral: 1275 mL  Total OUT: 1275 mL    Total NET: 785 mL      16 Jan 2019 07:01  -  16 Jan 2019 10:50  --------------------------------------------------------  IN:    multiple electrolytes Injection Type 1: 150 mL    multiple electrolytes Injection Type 1multiple electrolytes Injection Type 1: 150 mL  Total IN: 300 mL    OUT:    Indwelling Catheter - Urethral: 120 mL  Total OUT: 120 mL    Total NET: 180 mL              MEDICATIONS  (STANDING):  carvedilol 25 milliGRAM(s) Oral every 12 hours  chlorhexidine 2% Cloths 1 Application(s) Topical daily  dextrose 5%. 1000 milliLiter(s) (50 mL/Hr) IV Continuous <Continuous>  dextrose 50% Injectable 12.5 Gram(s) IV Push once  dextrose 50% Injectable 25 Gram(s) IV Push once  dextrose 50% Injectable 25 Gram(s) IV Push once  heparin  Injectable 5000 Unit(s) SubCutaneous every 8 hours  insulin lispro (HumaLOG) corrective regimen sliding scale   SubCutaneous three times a day before meals  multiple electrolytes Injection Type 1 1000 milliLiter(s) (75 mL/Hr) IV Continuous <Continuous>  pantoprazole    Tablet 40 milliGRAM(s) Oral before breakfast    MEDICATIONS  (PRN):  acetaminophen   Tablet .. 650 milliGRAM(s) Oral every 6 hours PRN Mild Pain (1 - 3)  dextrose 40% Gel 15 Gram(s) Oral once PRN Blood Glucose LESS THAN 70 milliGRAM(s)/deciliter  glucagon  Injectable 1 milliGRAM(s) IntraMuscular once PRN Glucose LESS THAN 70 milligrams/deciliter          Physical Exam:    Gen: Resting comfortably in bed    HEENT: PERRL, EOMI    Neurological: Alert and oriented without focal deficit    Neck: Trachea midline, no evidence of JVD, FROM without pain, neck symmetric    Pulmonary: CTAB with decreased breath sounds at the bases    Cardiovascular: S1S2    Gastrointestinal: Soft, non-tender, non-distended    : Russo in place draining yellow urine    Skin: Intact    Musculoskeletal: FROM without pain, no deformity or areas of tenderness    LABS:  CBC Full  -  ( 16 Jan 2019 04:42 )  WBC Count : 5.4 K/uL  Hemoglobin : 8.5 g/dL  Hematocrit : 25.6 %  Platelet Count - Automated : 121 K/uL  Mean Cell Volume : 89.8 fl  Mean Cell Hemoglobin : 29.8 pg  Mean Cell Hemoglobin Concentration : 33.2 g/dL  Auto Neutrophil # : 3.9 K/uL  Auto Lymphocyte # : 0.8 K/uL  Auto Monocyte # : 0.4 K/uL  Auto Eosinophil # : 0.2 K/uL  Auto Basophil # : 0.0 K/uL  Auto Neutrophil % : 73.1 %  Auto Lymphocyte % : 15.6 %  Auto Monocyte % : 7.6 %  Auto Eosinophil % : 3.3 %  Auto Basophil % : 0.2 %    01-16    140  |  105  |  33.0<H>  ----------------------------<  156<H>  4.3   |  27.0  |  0.97    Ca    8.0<L>      16 Jan 2019 04:42  Phos  3.7     01-16  Mg     2.4     01-16    TPro  6.0<L>  /  Alb  2.9<L>  /  TBili  1.0  /  DBili  x   /  AST  37<H>  /  ALT  34<H>  /  AlkPhos  113  01-16    PT/INR - ( 16 Jan 2019 04:42 )   PT: 13.4 sec;   INR: 1.16 ratio         PTT - ( 16 Jan 2019 04:42 )  PTT:28.2 sec    RECENT CULTURES:      LIVER FUNCTIONS - ( 16 Jan 2019 04:42 )  Alb: 2.9 g/dL / Pro: 6.0 g/dL / ALK PHOS: 113 U/L / ALT: 34 U/L / AST: 37 U/L / GGT: x                   ASSESSMENT/PLAN:  63y Female admitted with GIB of unknown origin. Colonoscopy and EGD negative. Awaiting CT enterography. LUQ/chest pain could be due to splenic injury secondary to colonoscopy, will review CT     Neuro: Pain control, delirium precautions, sleep hygiene     CV: Continue non-invasive blood pressure monitoring     Pulm: Encourage incentive spirometry, OOB as tolerated     GI/Nutrition: CT enterography today. Will resume diet afterwards    /Renal: Russo to be removed today    ID: No issue    Endo: ISS    Skin: Repositioning for DTI prevention while in bed    Heme/DVT Prophylaxis: SCDs. Will start SQH if no acute findings on imaging    Lines/Tubes: Will remove cordis today    Dispo: Floors if no acute findings on CT 24h Events:  EGD negative for acute bleeding or stigmata of bleeding. CT Enterography ordered. No further evidence of GIB.    Subjective:  Pt offers no new complaints but does continue to complain of left chest/LUQ.     ICU Vital Signs Last 24 Hrs  T(C): 37.2 (16 Jan 2019 08:00), Max: 37.7 (16 Jan 2019 00:00)  T(F): 98.9 (16 Jan 2019 08:00), Max: 99.9 (16 Jan 2019 00:00)  HR: 62 (16 Jan 2019 10:00) (62 - 73)  BP: --  BP(mean): --  ABP: 123/51 (16 Jan 2019 10:00) (106/44 - 173/68)  ABP(mean): 73 (16 Jan 2019 10:00) (65 - 101)  RR: 24 (16 Jan 2019 10:00) (6 - 27)  SpO2: 100% (16 Jan 2019 10:00) (93% - 100%)      I&O's Detail    15 Noah 2019 07:01  -  16 Jan 2019 07:00  --------------------------------------------------------  IN:    IV PiggyBack: 350 mL    multiple electrolytes Injection Type 1multiple electrolytes Injection Type 1: 1470 mL    Oral Fluid: 240 mL  Total IN: 2060 mL    OUT:    Indwelling Catheter - Urethral: 1275 mL  Total OUT: 1275 mL    Total NET: 785 mL      16 Jan 2019 07:01  -  16 Jan 2019 10:50  --------------------------------------------------------  IN:    multiple electrolytes Injection Type 1: 150 mL    multiple electrolytes Injection Type 1multiple electrolytes Injection Type 1: 150 mL  Total IN: 300 mL    OUT:    Indwelling Catheter - Urethral: 120 mL  Total OUT: 120 mL    Total NET: 180 mL              MEDICATIONS  (STANDING):  carvedilol 25 milliGRAM(s) Oral every 12 hours  chlorhexidine 2% Cloths 1 Application(s) Topical daily  dextrose 5%. 1000 milliLiter(s) (50 mL/Hr) IV Continuous <Continuous>  dextrose 50% Injectable 12.5 Gram(s) IV Push once  dextrose 50% Injectable 25 Gram(s) IV Push once  dextrose 50% Injectable 25 Gram(s) IV Push once  heparin  Injectable 5000 Unit(s) SubCutaneous every 8 hours  insulin lispro (HumaLOG) corrective regimen sliding scale   SubCutaneous three times a day before meals  multiple electrolytes Injection Type 1 1000 milliLiter(s) (75 mL/Hr) IV Continuous <Continuous>  pantoprazole    Tablet 40 milliGRAM(s) Oral before breakfast    MEDICATIONS  (PRN):  acetaminophen   Tablet .. 650 milliGRAM(s) Oral every 6 hours PRN Mild Pain (1 - 3)  dextrose 40% Gel 15 Gram(s) Oral once PRN Blood Glucose LESS THAN 70 milliGRAM(s)/deciliter  glucagon  Injectable 1 milliGRAM(s) IntraMuscular once PRN Glucose LESS THAN 70 milligrams/deciliter          Physical Exam:    Gen: Resting comfortably in bed    HEENT: PERRL, EOMI    Neurological: Alert and oriented without focal deficit    Neck: Trachea midline, no evidence of JVD, FROM without pain, neck symmetric    Pulmonary: CTAB with decreased breath sounds at the bases    Cardiovascular: S1S2    Gastrointestinal: Soft, non-tender, non-distended    : Russo in place draining yellow urine    Skin: Intact    Musculoskeletal: FROM without pain, no deformity or areas of tenderness    LABS:  CBC Full  -  ( 16 Jan 2019 04:42 )  WBC Count : 5.4 K/uL  Hemoglobin : 8.5 g/dL  Hematocrit : 25.6 %  Platelet Count - Automated : 121 K/uL  Mean Cell Volume : 89.8 fl  Mean Cell Hemoglobin : 29.8 pg  Mean Cell Hemoglobin Concentration : 33.2 g/dL  Auto Neutrophil # : 3.9 K/uL  Auto Lymphocyte # : 0.8 K/uL  Auto Monocyte # : 0.4 K/uL  Auto Eosinophil # : 0.2 K/uL  Auto Basophil # : 0.0 K/uL  Auto Neutrophil % : 73.1 %  Auto Lymphocyte % : 15.6 %  Auto Monocyte % : 7.6 %  Auto Eosinophil % : 3.3 %  Auto Basophil % : 0.2 %    01-16    140  |  105  |  33.0<H>  ----------------------------<  156<H>  4.3   |  27.0  |  0.97    Ca    8.0<L>      16 Jan 2019 04:42  Phos  3.7     01-16  Mg     2.4     01-16    TPro  6.0<L>  /  Alb  2.9<L>  /  TBili  1.0  /  DBili  x   /  AST  37<H>  /  ALT  34<H>  /  AlkPhos  113  01-16    PT/INR - ( 16 Jan 2019 04:42 )   PT: 13.4 sec;   INR: 1.16 ratio         PTT - ( 16 Jan 2019 04:42 )  PTT:28.2 sec    RECENT CULTURES:      LIVER FUNCTIONS - ( 16 Jan 2019 04:42 )  Alb: 2.9 g/dL / Pro: 6.0 g/dL / ALK PHOS: 113 U/L / ALT: 34 U/L / AST: 37 U/L / GGT: x                   ASSESSMENT/PLAN:  63y Female admitted with GIB of unknown origin. Colonoscopy and EGD negative. Awaiting CT enterography. LUQ/chest pain could be due to splenic injury secondary to colonoscopy, will review CT     Neuro: Pain control, delirium precautions, sleep hygiene     CV: Continue non-invasive blood pressure monitoring     Pulm: Encourage incentive spirometry, OOB as tolerated     GI/Nutrition: CT enterography today. Will resume diet afterwards    /Renal: Russo to be removed today    ID: No issue    Endo: ISS    Skin: Repositioning for DTI prevention while in bed    Heme/DVT Prophylaxis: Acute blood loss anemia from GI bleed.  Stable, no current hemorrhage./SCDs. Will start SQH if no acute findings on imaging    Lines/Tubes: Will remove cordis today    Dispo: Floors if no acute findings on CT

## 2019-01-16 NOTE — PROGRESS NOTE ADULT - SUBJECTIVE AND OBJECTIVE BOX
NEPHROLOGY INTERVAL HPI/OVERNIGHT EVENTS:  pt remains well  no acute distress    MEDICATIONS  (STANDING):  carvedilol 25 milliGRAM(s) Oral every 12 hours  chlorhexidine 2% Cloths 1 Application(s) Topical daily  dextrose 5%. 1000 milliLiter(s) (50 mL/Hr) IV Continuous <Continuous>  dextrose 50% Injectable 12.5 Gram(s) IV Push once  dextrose 50% Injectable 25 Gram(s) IV Push once  dextrose 50% Injectable 25 Gram(s) IV Push once  heparin  Injectable 5000 Unit(s) SubCutaneous every 8 hours  insulin lispro (HumaLOG) corrective regimen sliding scale   SubCutaneous three times a day before meals  multiple electrolytes Injection Type 1 1000 milliLiter(s) (75 mL/Hr) IV Continuous <Continuous>  pantoprazole    Tablet 40 milliGRAM(s) Oral before breakfast    MEDICATIONS  (PRN):  acetaminophen   Tablet .. 650 milliGRAM(s) Oral every 6 hours PRN Mild Pain (1 - 3)  dextrose 40% Gel 15 Gram(s) Oral once PRN Blood Glucose LESS THAN 70 milliGRAM(s)/deciliter  glucagon  Injectable 1 milliGRAM(s) IntraMuscular once PRN Glucose LESS THAN 70 milligrams/deciliter      Allergies    Accupril (Other)          Vital Signs Last 24 Hrs  T(C): 37.2 (16 Jan 2019 08:00), Max: 37.7 (16 Jan 2019 00:00)  T(F): 98.9 (16 Jan 2019 08:00), Max: 99.9 (16 Jan 2019 00:00)  HR: 62 (16 Jan 2019 10:00) (62 - 73)  BP: --  BP(mean): --  RR: 24 (16 Jan 2019 10:00) (6 - 27)  SpO2: 100% (16 Jan 2019 10:00) (93% - 100%)    PHYSICAL EXAM:  Comfortable  Cardiovascular: Normal S1 S2, No rub  Respiratory: Decreased BS at bases but clear	  Neuro: A & O x 3; non focal  Gastrointestinal:  Soft, Non-tender, + BS	  Extremities: trace LE edema    LABS:                        8.5    5.4   )-----------( 121      ( 16 Jan 2019 04:42 )             25.6     % Saturation, Iron: 11 % (01.16.19 @ 04:42)    Ferritin, Serum: 200 ng/mL (01.16.19 @ 04:42)        01-16    140  |  105  |  33.0<H>  ----------------------------<  156<H>  4.3   |  27.0  |  0.97        Ca    8.0<L>      16 Jan 2019 04:42  Phos  3.7     01-16  Mg     2.4     01-16    TPro  6.0<L>  /  Alb  2.9<L>  /  TBili  1.0  /  DBili  x   /  AST  37<H>  /  ALT  34<H>  /  AlkPhos  113  01-16    PT/INR - ( 16 Jan 2019 04:42 )   PT: 13.4 sec;   INR: 1.16 ratio         PTT - ( 16 Jan 2019 04:42 )  PTT:28.2 sec    Magnesium, Serum: 2.4 mg/dL (01-16 @ 04:42)  Phosphorus Level, Serum: 3.7 mg/dL (01-16 @ 04:42)  Magnesium, Serum: 2.3 mg/dL (01-15 @ 14:22)  Phosphorus Level, Serum: 3.5 mg/dL (01-15 @ 14:22)      RADIOLOGY & ADDITIONAL TESTS:

## 2019-01-16 NOTE — CHART NOTE - NSCHARTNOTEFT_GEN_A_CORE
SICU Downgrade    63yoF downgraded from the SICU after having a LGIB and hyperkalemia. Patient had a visceral angiography on 1/14/19 which showed no active arterial extravasation and R CFA closed. She then had a colonoscopy on 1/14/19 which showed a 1cm polyp at the hepatic flexure but no blood or active bleeding. Patient was seen and examined at bedside. She is complaining of the same pain in her LUQ region that has not changed from baseline - the pain is tolerable. Patient otherwise had her a-line and fontenot removed prior to coming to the floor.    PE:  Gen: obese, nad, a&ox3  HEENT: R eye opaque haze, L eye: PERRLA, EOMI  CV: rrr  resp: nonlabored breathing  gi: obese, soft, nd, mild tenderness to LUQ. patches of ecchymosis on LLQ.  gu: fontenot out, voided 200 with 204 remaining on bladder scan  msk: no c/c/e    A/P:  63y F LGIB, found in proximal transverse colon, s/p IR angiography without signs of extrav or active bleeding, Csope 1/14 negative for LGIB, nontoxic, HD nl  - pain control prn  - serial abdominal exams for possible splenic injury  - encourage IS and OOB  - f/u plan for restarting ASA/plavix  - diabetic diet  - trend Hgb

## 2019-01-16 NOTE — PROGRESS NOTE ADULT - SUBJECTIVE AND OBJECTIVE BOX
Patient is a 63y old  Female who presents with a chief complaint of rectal bleeding (2019 07:38)      HPI:  ACUTE CARE SURGERY SICU H&P    HPI: 63y Female present to ED with 3 days abdominal pain and bloody red BM.   Hgb stable. No further rectal bleeding. No abdominal pain. COlonoscopy showed hepatic flexure polyps. Egd essentially negative.       PAST MEDICAL & SURGICAL HISTORY:  Herniated disc, cervical  PAD (peripheral artery disease)  Legally blind  History of peripheral vascular disease  History of retinal detachment  History of CEA (carotid endarterectomy): Right  Hyperlipidemia  Glaucoma  Hypertension  Diabetes  S/P CABG x 1  After cataract  Uveitic glaucoma of both eyes  Detached retina  Atherosclerosis of right carotid artery   delivery delivered    FAMILY HISTORY:  Family history of diabetes mellitus  Family history of essential hypertension    [x] Family history not pertinent as reviewed with the patient and family    SOCIAL HISTORY:  denies toxic habits    ALLERGIES: NKA Accupril (Other)    Home Medications:  aspirin 81 mg oral tablet, chewable: 1 tab(s) orally once a day (26 Dec 2018 11:22)  atorvastatin 80 mg oral tablet: 1 tab(s) orally once a day (at bedtime) (26 Dec 2018 11:18)  carvedilol 25 mg oral tablet: 1 tab(s) orally every 12 hours (26 Dec 2018 11:18)  clopidogrel 75 mg oral tablet: 1 tab(s) orally once a day (26 Dec 2018 11:18)  docusate sodium 100 mg oral capsule: 1 cap(s) orally 2 times a day (26 Dec 2018 11:18)  DULoxetine 60 mg oral delayed release capsule: 1 cap(s) orally once a day (26 Dec 2018 11:18)  gabapentin 100 mg oral capsule: 1 cap(s) orally 3 times a day (26 Dec 2018 11:18)  isosorbide mononitrate 60 mg oral tablet, extended release: 1 tab(s) orally once a day (in the morning) (26 Dec 2018 11:18)  magnesium oxide 400 mg (240 mg elemental magnesium) oral tablet: 1 tab(s) orally once a day (26 Dec 2018 11:18)  metFORMIN 850 mg oral tablet: orally 2 times a day (with meals) (26 Dec 2018 11:18)  pantoprazole 40 mg oral delayed release tablet: 1 tab(s) orally once a day (before a meal) (26 Dec 2018 11:18)      --------------------------------------------------------------------------------------------    P--------------------------------------------------------------------------------------------    LABS                 6.8    6.6    )----------(  104       ( 2019 22:53 )               19.2      140    |  106    |  56.0   ----------------------------<  223        ( 2019 22:53 )  5.1     |  25.0   |  1.15     Ca    8.1        ( 2019 22:53 )  Phos  4.7       ( 2019 22:53 )  Mg     2.2       ( 2019 22:53 )    TPro  6.1    /  Alb  3.1    /  TBili  0.6    /  DBili  x      /  AST  29     /  ALT  20     /  AlkPhos  85     ( 2019 12:19 )    LIVER FUNCTIONS - ( 2019 12:19 )  Alb: 3.1 g/dL / Pro: 6.1 g/dL / ALK PHOS: 85 U/L / ALT: 20 U/L / AST: 29 U/L / GGT: x           PT/INR -  18.0 sec / 1.54 ratio   ( 2019 22:53 )    ABG - ( 2019 15:13 )  pH: 7.41  /  pCO2: 37    /  pO2: 67    / HCO3: 23    / Base Excess: -1.4  /  SaO2: 95          --------------------------------------------------------------------------------------------  I-    REVIEW OF SYSTEMS:  Constitutional: +fever, weight loss or fatigue  ENMT:  No difficulty hearing, tinnitus, vertigo; No sinus or throat pain  Respiratory: No cough, wheezing, chills or hemoptysis  Cardiovascular: No chest pain, palpitations, dizziness or leg swelling  Gastrointestinal: No abdominal or epigastric pain. No nausea, vomiting or hematemesis; No diarrhea or constipation. No melena or hematochezia.  Skin: No itching, burning, rashes or lesions   Musculoskeletal: No joint pain or swelling; No muscle, back or extremity pain    PAST MEDICAL & SURGICAL HISTORY:  Herniated disc, cervical  PAD (peripheral artery disease)  Legally blind  History of peripheral vascular disease  History of retinal detachment  History of CEA (carotid endarterectomy): Right  Hyperlipidemia  Glaucoma  Hypertension  Diabetes  S/P CABG x 1  After cataract  Uveitic glaucoma of both eyes  Detached retina  Atherosclerosis of right carotid artery   delivery delivered      FAMILY HISTORY:  Family history of diabetes mellitus  Family history of essential hypertension      SOCIAL HISTORY:  Smoking Status: [ ] Current, [ ] Former, [ ] Never  Pack Years:  [  ] EtOH=no  [  ] IVDA    MEDICATIONS:  MEDICATIONS  (STANDING):  carvedilol 25 milliGRAM(s) Oral every 12 hours  chlorhexidine 2% Cloths 1 Application(s) Topical daily  dextrose 5%. 1000 milliLiter(s) (50 mL/Hr) IV Continuous <Continuous>  dextrose 50% Injectable 12.5 Gram(s) IV Push once  dextrose 50% Injectable 25 Gram(s) IV Push once  dextrose 50% Injectable 25 Gram(s) IV Push once  heparin  Injectable 5000 Unit(s) SubCutaneous every 8 hours  insulin lispro (HumaLOG) corrective regimen sliding scale   SubCutaneous three times a day before meals  multiple electrolytes Injection Type 1 1000 milliLiter(s) (60 mL/Hr) IV Continuous <Continuous>  multiple electrolytes Injection Type 1 1000 milliLiter(s) (75 mL/Hr) IV Continuous <Continuous>  pantoprazole    Tablet 40 milliGRAM(s) Oral before breakfast    MEDICATIONS  (PRN):  acetaminophen   Tablet .. 650 milliGRAM(s) Oral every 6 hours PRN Mild Pain (1 - 3)  dextrose 40% Gel 15 Gram(s) Oral once PRN Blood Glucose LESS THAN 70 milliGRAM(s)/deciliter  glucagon  Injectable 1 milliGRAM(s) IntraMuscular once PRN Glucose LESS THAN 70 milligrams/deciliter      Allergies    Accupril (Other)    Intolerances        Vital Signs Last 24 Hrs  T(C): 37.5 (2019 04:00), Max: 37.7 (2019 00:00)  T(F): 99.5 (2019 04:00), Max: 99.9 (2019 00:00)  HR: 66 (2019 08:00) (63 - 73)  BP: --  BP(mean): --  RR: 23 (2019 08:00) (6 - 27)  SpO2: 100% (2019 08:00) (93% - 100%)    01-15 @ 07:16 @ 07:00  --------------------------------------------------------  IN: 2060 mL / OUT: 1275 mL / NET: 785 mL     @ 07:01  16 @ 08:48  --------------------------------------------------------  IN: 150 mL / OUT: 60 mL / NET: 90 mL          PHYSICAL EXAM:    General: frail   HEENT: MMM, conjunctiva and sclera clear  H- RRR  L- CTA  Gastrointestinal: Soft, non-tender non-distended; Normal bowel sounds; No rebound or guarding  Extremities: Normal range of motion, No clubbing, cyanosis or edema  Neurological: Alert and oriented x3  Skin: Warm and dry. No obvious rash      LABS:                        8.5    5.4   )-----------( 121      ( 2019 04:42 )             25.6     2019 04:42    140    |  105    |  33.0   ----------------------------<  156    4.3     |  27.0   |  0.97     Ca    8.0        2019 04:42  Phos  3.7       2019 04:42  Mg     2.4       2019 04:42    TPro  6.0    /  Alb  2.9    /  TBili  1.0    /  DBili  x      /  AST  37     /  ALT  34     /  AlkPhos  113    / Amylase x      /Lipase x      2019 04:42        Iron - Total Binding Capacity.: 285 ug/dL (19 @ 04:42)  Iron Total, Serum: 30 ug/dL (19 @ 04:42)  Ferritin, Serum: 200 ng/mL (19 @ 04:42)        RADIOLOGY & ADDITIONAL STUDIES:     < from: CT Angio Abdomen and Pelvis w/ IV Cont (19 @ 13:30) >  Impression:    Active lower GI bleed with intraluminal extravasation of contrast seen   within the redundant proximal transverse colon as described above. This   was reported to Dr. Flanagan on 2019 at 2:00 PM.    Additional findings as described.    < end of copied text > Patient is a 63y old  Female who presents with a chief complaint of rectal bleeding (2019 07:38)      HPI:  ACUTE CARE SURGERY SICU H&P- see n in SICU    HPI: 63y Female present to ED with 3 days abdominal pain and bloody red BM.   Hgb stable. No further rectal bleeding. No abdominal pain. COlonoscopy showed hepatic flexure polyps. Egd essentially negative.       PAST MEDICAL & SURGICAL HISTORY:  Herniated disc, cervical  PAD (peripheral artery disease)  Legally blind  History of peripheral vascular disease  History of retinal detachment  History of CEA (carotid endarterectomy): Right  Hyperlipidemia  Glaucoma  Hypertension  Diabetes  S/P CABG x 1  After cataract  Uveitic glaucoma of both eyes  Detached retina  Atherosclerosis of right carotid artery   delivery delivered    FAMILY HISTORY:  Family history of diabetes mellitus  Family history of essential hypertension    [x] Family history not pertinent as reviewed with the patient and family    SOCIAL HISTORY:  denies toxic habits    ALLERGIES: NKA Accupril (Other)    Home Medications:  aspirin 81 mg oral tablet, chewable: 1 tab(s) orally once a day (26 Dec 2018 11:22)  atorvastatin 80 mg oral tablet: 1 tab(s) orally once a day (at bedtime) (26 Dec 2018 11:18)  carvedilol 25 mg oral tablet: 1 tab(s) orally every 12 hours (26 Dec 2018 11:18)  clopidogrel 75 mg oral tablet: 1 tab(s) orally once a day (26 Dec 2018 11:18)  docusate sodium 100 mg oral capsule: 1 cap(s) orally 2 times a day (26 Dec 2018 11:18)  DULoxetine 60 mg oral delayed release capsule: 1 cap(s) orally once a day (26 Dec 2018 11:18)  gabapentin 100 mg oral capsule: 1 cap(s) orally 3 times a day (26 Dec 2018 11:18)  isosorbide mononitrate 60 mg oral tablet, extended release: 1 tab(s) orally once a day (in the morning) (26 Dec 2018 11:18)  magnesium oxide 400 mg (240 mg elemental magnesium) oral tablet: 1 tab(s) orally once a day (26 Dec 2018 11:18)  metFORMIN 850 mg oral tablet: orally 2 times a day (with meals) (26 Dec 2018 11:18)  pantoprazole 40 mg oral delayed release tablet: 1 tab(s) orally once a day (before a meal) (26 Dec 2018 11:18)      --------------------------------------------------------------------------------------------    P--------------------------------------------------------------------------------------------    LABS                 6.8    6.6    )----------(  104       ( 2019 22:53 )               19.2      140    |  106    |  56.0   ----------------------------<  223        ( 2019 22:53 )  5.1     |  25.0   |  1.15     Ca    8.1        ( 2019 22:53 )  Phos  4.7       ( 2019 22:53 )  Mg     2.2       ( 2019 22:53 )    TPro  6.1    /  Alb  3.1    /  TBili  0.6    /  DBili  x      /  AST  29     /  ALT  20     /  AlkPhos  85     ( 2019 12:19 )    LIVER FUNCTIONS - ( 2019 12:19 )  Alb: 3.1 g/dL / Pro: 6.1 g/dL / ALK PHOS: 85 U/L / ALT: 20 U/L / AST: 29 U/L / GGT: x           PT/INR -  18.0 sec / 1.54 ratio   ( 2019 22:53 )    ABG - ( 2019 15:13 )  pH: 7.41  /  pCO2: 37    /  pO2: 67    / HCO3: 23    / Base Excess: -1.4  /  SaO2: 95          --------------------------------------------------------------------------------------------  I-    REVIEW OF SYSTEMS:  Constitutional: +fever, weight loss or fatigue  ENMT:  No difficulty hearing, tinnitus, vertigo; No sinus or throat pain  Respiratory: No cough, wheezing, chills or hemoptysis  Cardiovascular: No chest pain, palpitations, dizziness or leg swelling  Gastrointestinal: No abdominal or epigastric pain. No nausea, vomiting or hematemesis; No diarrhea or constipation. No melena or hematochezia.  Skin: No itching, burning, rashes or lesions   Musculoskeletal: No joint pain or swelling; No muscle, back or extremity pain    PAST MEDICAL & SURGICAL HISTORY:  Herniated disc, cervical  PAD (peripheral artery disease)  Legally blind  History of peripheral vascular disease  History of retinal detachment  History of CEA (carotid endarterectomy): Right  Hyperlipidemia  Glaucoma  Hypertension  Diabetes  S/P CABG x 1  After cataract  Uveitic glaucoma of both eyes  Detached retina  Atherosclerosis of right carotid artery   delivery delivered      FAMILY HISTORY:  Family history of diabetes mellitus  Family history of essential hypertension      SOCIAL HISTORY:  Smoking Status: [ ] Current, [ ] Former, [ ] Never  Pack Years:  [  ] EtOH=no  [  ] IVDA    MEDICATIONS:  MEDICATIONS  (STANDING):  carvedilol 25 milliGRAM(s) Oral every 12 hours  chlorhexidine 2% Cloths 1 Application(s) Topical daily  dextrose 5%. 1000 milliLiter(s) (50 mL/Hr) IV Continuous <Continuous>  dextrose 50% Injectable 12.5 Gram(s) IV Push once  dextrose 50% Injectable 25 Gram(s) IV Push once  dextrose 50% Injectable 25 Gram(s) IV Push once  heparin  Injectable 5000 Unit(s) SubCutaneous every 8 hours  insulin lispro (HumaLOG) corrective regimen sliding scale   SubCutaneous three times a day before meals  multiple electrolytes Injection Type 1 1000 milliLiter(s) (60 mL/Hr) IV Continuous <Continuous>  multiple electrolytes Injection Type 1 1000 milliLiter(s) (75 mL/Hr) IV Continuous <Continuous>  pantoprazole    Tablet 40 milliGRAM(s) Oral before breakfast    MEDICATIONS  (PRN):  acetaminophen   Tablet .. 650 milliGRAM(s) Oral every 6 hours PRN Mild Pain (1 - 3)  dextrose 40% Gel 15 Gram(s) Oral once PRN Blood Glucose LESS THAN 70 milliGRAM(s)/deciliter  glucagon  Injectable 1 milliGRAM(s) IntraMuscular once PRN Glucose LESS THAN 70 milligrams/deciliter      Allergies    Accupril (Other)    Intolerances        Vital Signs Last 24 Hrs  T(C): 37.5 (2019 04:00), Max: 37.7 (2019 00:00)  T(F): 99.5 (2019 04:00), Max: 99.9 (2019 00:00)  HR: 66 (2019 08:00) (63 - 73)  BP: --  BP(mean): --  RR: 23 (2019 08:00) (6 - 27)  SpO2: 100% (2019 08:00) (93% - 100%)    01-15 @ 07:01  16 @ 07:00  --------------------------------------------------------  IN: 2060 mL / OUT: 1275 mL / NET: 785 mL     @ 07:01   @ 08:48  --------------------------------------------------------  IN: 150 mL / OUT: 60 mL / NET: 90 mL          PHYSICAL EXAM:    General: frail   HEENT: MMM, conjunctiva and sclera clear  H- RRR  L- CTA  Gastrointestinal: Soft, non-tender non-distended; Normal bowel sounds; No rebound or guarding  Extremities: Normal range of motion, No clubbing, cyanosis or edema  Neurological: Alert and oriented x3  Skin: Warm and dry. No obvious rash      LABS:                        8.5    5.4   )-----------( 121      ( 2019 04:42 )             25.6     2019 04:42    140    |  105    |  33.0   ----------------------------<  156    4.3     |  27.0   |  0.97     Ca    8.0        2019 04:42  Phos  3.7       2019 04:42  Mg     2.4       2019 04:42    TPro  6.0    /  Alb  2.9    /  TBili  1.0    /  DBili  x      /  AST  37     /  ALT  34     /  AlkPhos  113    / Amylase x      /Lipase x      2019 04:42        Iron - Total Binding Capacity.: 285 ug/dL (19 @ 04:42)  Iron Total, Serum: 30 ug/dL (19 @ 04:42)  Ferritin, Serum: 200 ng/mL (19 @ 04:42)        RADIOLOGY & ADDITIONAL STUDIES:     < from: CT Angio Abdomen and Pelvis w/ IV Cont (19 @ 13:30) >  Impression:    Active lower GI bleed with intraluminal extravasation of contrast seen   within the redundant proximal transverse colon as described above. This   was reported to Dr. Flanagan on 2019 at 2:00 PM.    Additional findings as described.    < end of copied text >

## 2019-01-17 LAB
24R-OH-CALCIDIOL SERPL-MCNC: 25.1 NG/ML — LOW (ref 30–80)
BASOPHILS # BLD AUTO: 0 K/UL — SIGNIFICANT CHANGE UP (ref 0–0.2)
BASOPHILS NFR BLD AUTO: 0.2 % — SIGNIFICANT CHANGE UP (ref 0–2)
CALCIUM SERPL-MCNC: 7.9 MG/DL — LOW (ref 8.4–10.5)
CALCIUM SERPL-MCNC: 8.1 MG/DL — LOW (ref 8.4–10.5)
EOSINOPHIL # BLD AUTO: 0.2 K/UL — SIGNIFICANT CHANGE UP (ref 0–0.5)
EOSINOPHIL NFR BLD AUTO: 4.2 % — SIGNIFICANT CHANGE UP (ref 0–6)
GLUCOSE BLDC GLUCOMTR-MCNC: 172 MG/DL — HIGH (ref 70–99)
GLUCOSE BLDC GLUCOMTR-MCNC: 193 MG/DL — HIGH (ref 70–99)
GLUCOSE BLDC GLUCOMTR-MCNC: 220 MG/DL — HIGH (ref 70–99)
GLUCOSE BLDC GLUCOMTR-MCNC: 232 MG/DL — HIGH (ref 70–99)
HCT VFR BLD CALC: 28.3 % — LOW (ref 37–47)
HGB BLD-MCNC: 9 G/DL — LOW (ref 12–16)
LYMPHOCYTES # BLD AUTO: 0.8 K/UL — LOW (ref 1–4.8)
LYMPHOCYTES # BLD AUTO: 14.2 % — LOW (ref 20–55)
MCHC RBC-ENTMCNC: 28.9 PG — SIGNIFICANT CHANGE UP (ref 27–31)
MCHC RBC-ENTMCNC: 31.8 G/DL — LOW (ref 32–36)
MCV RBC AUTO: 91 FL — SIGNIFICANT CHANGE UP (ref 81–99)
MONOCYTES # BLD AUTO: 0.5 K/UL — SIGNIFICANT CHANGE UP (ref 0–0.8)
MONOCYTES NFR BLD AUTO: 8 % — SIGNIFICANT CHANGE UP (ref 3–10)
NEUTROPHILS # BLD AUTO: 4.2 K/UL — SIGNIFICANT CHANGE UP (ref 1.8–8)
NEUTROPHILS NFR BLD AUTO: 73.4 % — HIGH (ref 37–73)
PLATELET # BLD AUTO: 128 K/UL — LOW (ref 150–400)
PTH-INTACT FLD-MCNC: 148 PG/ML — HIGH (ref 15–65)
PTH-INTACT FLD-MCNC: 175 PG/ML — HIGH (ref 15–65)
RBC # BLD: 3.11 M/UL — LOW (ref 4.4–5.2)
RBC # FLD: 18.2 % — HIGH (ref 11–15.6)
WBC # BLD: 5.8 K/UL — SIGNIFICANT CHANGE UP (ref 4.8–10.8)
WBC # FLD AUTO: 5.8 K/UL — SIGNIFICANT CHANGE UP (ref 4.8–10.8)

## 2019-01-17 PROCEDURE — 99233 SBSQ HOSP IP/OBS HIGH 50: CPT

## 2019-01-17 RX ORDER — ASPIRIN/CALCIUM CARB/MAGNESIUM 324 MG
81 TABLET ORAL DAILY
Qty: 0 | Refills: 0 | Status: DISCONTINUED | OUTPATIENT
Start: 2019-01-17 | End: 2019-01-18

## 2019-01-17 RX ORDER — CLOPIDOGREL BISULFATE 75 MG/1
75 TABLET, FILM COATED ORAL DAILY
Qty: 0 | Refills: 0 | Status: DISCONTINUED | OUTPATIENT
Start: 2019-01-17 | End: 2019-01-17

## 2019-01-17 RX ADMIN — Medication 3 MILLIGRAM(S): at 00:28

## 2019-01-17 RX ADMIN — HEPARIN SODIUM 5000 UNIT(S): 5000 INJECTION INTRAVENOUS; SUBCUTANEOUS at 13:56

## 2019-01-17 RX ADMIN — Medication 20 MILLIGRAM(S): at 17:03

## 2019-01-17 RX ADMIN — Medication 650 MILLIGRAM(S): at 16:14

## 2019-01-17 RX ADMIN — Medication 2: at 17:03

## 2019-01-17 RX ADMIN — CLOPIDOGREL BISULFATE 75 MILLIGRAM(S): 75 TABLET, FILM COATED ORAL at 12:19

## 2019-01-17 RX ADMIN — CARVEDILOL PHOSPHATE 25 MILLIGRAM(S): 80 CAPSULE, EXTENDED RELEASE ORAL at 05:10

## 2019-01-17 RX ADMIN — Medication 20 MILLIGRAM(S): at 05:10

## 2019-01-17 RX ADMIN — DULOXETINE HYDROCHLORIDE 60 MILLIGRAM(S): 30 CAPSULE, DELAYED RELEASE ORAL at 12:19

## 2019-01-17 RX ADMIN — Medication 4: at 12:19

## 2019-01-17 RX ADMIN — CHLORHEXIDINE GLUCONATE 1 APPLICATION(S): 213 SOLUTION TOPICAL at 12:24

## 2019-01-17 RX ADMIN — Medication 650 MILLIGRAM(S): at 13:55

## 2019-01-17 RX ADMIN — LOSARTAN POTASSIUM 50 MILLIGRAM(S): 100 TABLET, FILM COATED ORAL at 05:10

## 2019-01-17 RX ADMIN — IRON SUCROSE 112.5 MILLIGRAM(S): 20 INJECTION, SOLUTION INTRAVENOUS at 17:03

## 2019-01-17 RX ADMIN — Medication 100 MILLIGRAM(S): at 17:03

## 2019-01-17 RX ADMIN — PANTOPRAZOLE SODIUM 40 MILLIGRAM(S): 20 TABLET, DELAYED RELEASE ORAL at 05:11

## 2019-01-17 RX ADMIN — Medication 2: at 08:03

## 2019-01-17 RX ADMIN — CARVEDILOL PHOSPHATE 25 MILLIGRAM(S): 80 CAPSULE, EXTENDED RELEASE ORAL at 17:03

## 2019-01-17 RX ADMIN — HEPARIN SODIUM 5000 UNIT(S): 5000 INJECTION INTRAVENOUS; SUBCUTANEOUS at 05:10

## 2019-01-17 RX ADMIN — GABAPENTIN 100 MILLIGRAM(S): 400 CAPSULE ORAL at 21:04

## 2019-01-17 RX ADMIN — ATORVASTATIN CALCIUM 80 MILLIGRAM(S): 80 TABLET, FILM COATED ORAL at 21:04

## 2019-01-17 RX ADMIN — Medication 81 MILLIGRAM(S): at 12:19

## 2019-01-17 RX ADMIN — HEPARIN SODIUM 5000 UNIT(S): 5000 INJECTION INTRAVENOUS; SUBCUTANEOUS at 21:04

## 2019-01-17 RX ADMIN — Medication 100 MILLIGRAM(S): at 05:10

## 2019-01-17 RX ADMIN — GABAPENTIN 100 MILLIGRAM(S): 400 CAPSULE ORAL at 13:56

## 2019-01-17 NOTE — PROGRESS NOTE ADULT - SUBJECTIVE AND OBJECTIVE BOX
HPI/OVERNIGHT EVENTS: 64yo F with GI blood examined at bedside and found in no distress. Patient had no overnight events. Patient is tolerating her diet, passing gas, having non bloody bowel movements, and voiding appropriately. Patient has no active complaints. Denies fever, chills, headache, dizziness, nausea, vomiting, diarrhea, constipation, chest pain, and shortness of breath.     Vital Signs Last 24 Hrs  T(C): 36.5 (17 Jan 2019 11:00), Max: 37.1 (16 Jan 2019 22:34)  T(F): 97.7 (17 Jan 2019 11:00), Max: 98.8 (16 Jan 2019 22:34)  HR: 63 (17 Jan 2019 11:00) (62 - 68)  BP: 108/67 (17 Jan 2019 11:00) (108/67 - 150/71)  BP(mean): 95 (16 Jan 2019 22:00) (89 - 102)  RR: 18 (17 Jan 2019 11:00) (18 - 30)  SpO2: 99% (16 Jan 2019 22:34) (95% - 100%)    I&O's Detail    16 Jan 2019 07:01  -  17 Jan 2019 07:00  --------------------------------------------------------  IN:    multiple electrolytes Injection Type 1multiple electrolytes Injection Type 1: 900 mL    multiple electrolytes Injection Type 1multiple electrolytes Injection Type 1: 150 mL    Solution: 100 mL  Total IN: 1150 mL    OUT:    Indwelling Catheter - Urethral: 1080 mL    Voided: 275 mL  Total OUT: 1355 mL    Total NET: -205 mL      17 Jan 2019 07:01  -  17 Jan 2019 12:02  --------------------------------------------------------  IN:    Oral Fluid: 360 mL  Total IN: 360 mL    OUT:  Total OUT: 0 mL    Total NET: 360 mL        Constitutional: patient resting comfortably in bed, in no acute distress  HEENT: EOMI / PERRL  Neck: No JVD, full ROM without pain  Respiratory: CTAB, respirations are unlabored, no accessory muscle use, no conversational dyspnea  Cardiovascular: regular rate & rhythm  Gastrointestinal: Abdomen soft, non-tender, non-distended, no rebound tenderness / guarding  Rectal: No bleeding per rectum. No blood on stools.     LABS:                        9.0    5.8   )-----------( 128      ( 17 Jan 2019 10:24 )             28.3     01-16    140  |  105  |  33.0<H>  ----------------------------<  156<H>  4.3   |  27.0  |  0.97    Ca    8.0<L>      16 Jan 2019 04:42  Phos  3.7     01-16  Mg     2.4     01-16    TPro  6.0<L>  /  Alb  2.9<L>  /  TBili  1.0  /  DBili  x   /  AST  37<H>  /  ALT  34<H>  /  AlkPhos  113  01-16    PT/INR - ( 16 Jan 2019 04:42 )   PT: 13.4 sec;   INR: 1.16 ratio         PTT - ( 16 Jan 2019 04:42 )  PTT:28.2 sec      MEDICATIONS  (STANDING):  aspirin  chewable 81 milliGRAM(s) Oral daily  atorvastatin 80 milliGRAM(s) Oral at bedtime  carvedilol 25 milliGRAM(s) Oral every 12 hours  chlorhexidine 2% Cloths 1 Application(s) Topical daily  clopidogrel Tablet 75 milliGRAM(s) Oral daily  dextrose 50% Injectable 12.5 Gram(s) IV Push once  dextrose 50% Injectable 25 Gram(s) IV Push once  dextrose 50% Injectable 25 Gram(s) IV Push once  docusate sodium 100 milliGRAM(s) Oral two times a day  DULoxetine 60 milliGRAM(s) Oral daily  epoetin sara Injectable 08512 Unit(s) SubCutaneous <User Schedule>  gabapentin 100 milliGRAM(s) Oral every 8 hours  heparin  Injectable 5000 Unit(s) SubCutaneous every 8 hours  insulin lispro (HumaLOG) corrective regimen sliding scale   SubCutaneous three times a day before meals  iron sucrose IVPB 250 milliGRAM(s) IV Intermittent <User Schedule>  losartan 50 milliGRAM(s) Oral daily  pantoprazole    Tablet 40 milliGRAM(s) Oral before breakfast  torsemide 20 milliGRAM(s) Oral every 12 hours    MEDICATIONS  (PRN):  acetaminophen   Tablet .. 650 milliGRAM(s) Oral every 6 hours PRN Mild Pain (1 - 3)  dextrose 40% Gel 15 Gram(s) Oral once PRN Blood Glucose LESS THAN 70 milliGRAM(s)/deciliter  glucagon  Injectable 1 milliGRAM(s) IntraMuscular once PRN Glucose LESS THAN 70 milligrams/deciliter  melatonin 3 milliGRAM(s) Oral at bedtime PRN Insomnia

## 2019-01-17 NOTE — PROGRESS NOTE ADULT - ASSESSMENT
64yo F w GI bleed that has improved. Yesterday she had a non bloody bowel movement.     - Trend her H&H, if improved then restart her aspirin 81mg and Plavix 75mg/day.   - D/C her IVF  - monitor bowel function and blood in stools. 64yo F w GI bleed that has improved. Yesterday she had a non bloody bowel movement.     - Trend her H&H, if improved then restart her aspirin 81mg and   - D/C her IVF  - monitor bowel function and blood in stools.

## 2019-01-17 NOTE — PROGRESS NOTE ADULT - ASSESSMENT
KEITH has resolved  DM with DM nephropathy + proteinuria  - OK from renal perspective to restart ARB  - avoid potential nephrotoxins    Hypocalcemia:   - iPTH high - 2 HPT   - Vit D pending  - corrected Ca 8.9    Anemia: +GI loss  Low Fe stores  - GI w/u in progress  - cont IV Venofer  - cont QUENTIN

## 2019-01-17 NOTE — PROGRESS NOTE ADULT - SUBJECTIVE AND OBJECTIVE BOX
NEPHROLOGY INTERVAL HPI/OVERNIGHT EVENTS:    Examined earlier    MEDICATIONS  (STANDING):  aspirin  chewable 81 milliGRAM(s) Oral daily  atorvastatin 80 milliGRAM(s) Oral at bedtime  carvedilol 25 milliGRAM(s) Oral every 12 hours  chlorhexidine 2% Cloths 1 Application(s) Topical daily  clopidogrel Tablet 75 milliGRAM(s) Oral daily  dextrose 50% Injectable 12.5 Gram(s) IV Push once  dextrose 50% Injectable 25 Gram(s) IV Push once  dextrose 50% Injectable 25 Gram(s) IV Push once  docusate sodium 100 milliGRAM(s) Oral two times a day  DULoxetine 60 milliGRAM(s) Oral daily  epoetin sara Injectable 91894 Unit(s) SubCutaneous <User Schedule>  gabapentin 100 milliGRAM(s) Oral every 8 hours  heparin  Injectable 5000 Unit(s) SubCutaneous every 8 hours  insulin lispro (HumaLOG) corrective regimen sliding scale   SubCutaneous three times a day before meals  iron sucrose IVPB 250 milliGRAM(s) IV Intermittent <User Schedule>  losartan 50 milliGRAM(s) Oral daily  pantoprazole    Tablet 40 milliGRAM(s) Oral before breakfast  torsemide 20 milliGRAM(s) Oral every 12 hours    MEDICATIONS  (PRN):  acetaminophen   Tablet .. 650 milliGRAM(s) Oral every 6 hours PRN Mild Pain (1 - 3)  dextrose 40% Gel 15 Gram(s) Oral once PRN Blood Glucose LESS THAN 70 milliGRAM(s)/deciliter  glucagon  Injectable 1 milliGRAM(s) IntraMuscular once PRN Glucose LESS THAN 70 milligrams/deciliter  melatonin 3 milliGRAM(s) Oral at bedtime PRN Insomnia      Allergies    Accupril (Other)    Intolerances        Vital Signs Last 24 Hrs  T(C): 36.5 (17 Jan 2019 11:00), Max: 37.1 (16 Jan 2019 22:34)  T(F): 97.7 (17 Jan 2019 11:00), Max: 98.8 (16 Jan 2019 22:34)  HR: 63 (17 Jan 2019 11:00) (62 - 68)  BP: 108/67 (17 Jan 2019 11:00) (108/67 - 150/71)  BP(mean): 95 (16 Jan 2019 22:00) (89 - 102)  RR: 18 (17 Jan 2019 11:00) (18 - 30)  SpO2: 99% (16 Jan 2019 22:34) (95% - 100%)  Daily     Daily     PHYSICAL EXAM:  Comfortable  Cardiovascular: Normal S1 S2, No rub  Respiratory: Decreased BS at bases but clear	  Neuro: A & O x 3; non focal  Gastrointestinal:  Soft, Non-tender, + BS	  Extremities: trace LE edema    LABS:                        9.0    5.8   )-----------( 128      ( 17 Jan 2019 10:24 )             28.3     01-16    140  |  105  |  33.0<H>  ----------------------------<  156<H>  4.3   |  27.0  |  0.97    Ca    8.0<L>      16 Jan 2019 04:42  Phos  3.7     01-16  Mg     2.4     01-16    TPro  6.0<L>  /  Alb  2.9<L>  /  TBili  1.0  /  DBili  x   /  AST  37<H>  /  ALT  34<H>  /  AlkPhos  113  01-16    PT/INR - ( 16 Jan 2019 04:42 )   PT: 13.4 sec;   INR: 1.16 ratio         PTT - ( 16 Jan 2019 04:42 )  PTT:28.2 sec    Intact PTH: 148 pg/mL (01-16 @ 16:55)          RADIOLOGY & ADDITIONAL TESTS:

## 2019-01-17 NOTE — PROGRESS NOTE ADULT - SUBJECTIVE AND OBJECTIVE BOX
INTERVAL HPI/OVERNIGHT EVENTS: No melena or hematochezia. Eager to go home.     ROS wnl     PAST MEDICAL & SURGICAL HISTORY:  Herniated disc, cervical  PAD (peripheral artery disease)  Legally blind  History of peripheral vascular disease  History of retinal detachment  History of CEA (carotid endarterectomy): Right  Hyperlipidemia  Glaucoma  Hypertension  Diabetes  S/P CABG x 1  After cataract  Uveitic glaucoma of both eyes  Detached retina  Atherosclerosis of right carotid artery   delivery delivered      Home Medications:  aspirin 81 mg oral tablet, chewable: 1 tab(s) orally once a day (26 Dec 2018 11:22)  atorvastatin 80 mg oral tablet: 1 tab(s) orally once a day (at bedtime) (26 Dec 2018 11:18)  carvedilol 25 mg oral tablet: 1 tab(s) orally every 12 hours (26 Dec 2018 11:18)  clopidogrel 75 mg oral tablet: 1 tab(s) orally once a day (26 Dec 2018 11:18)  docusate sodium 100 mg oral capsule: 1 cap(s) orally 2 times a day (26 Dec 2018 11:18)  DULoxetine 60 mg oral delayed release capsule: 1 cap(s) orally once a day (26 Dec 2018 11:18)  gabapentin 100 mg oral capsule: 1 cap(s) orally 3 times a day (26 Dec 2018 11:18)  isosorbide mononitrate 60 mg oral tablet, extended release: 1 tab(s) orally once a day (in the morning) (26 Dec 2018 11:18)  magnesium oxide 400 mg (240 mg elemental magnesium) oral tablet: 1 tab(s) orally once a day (26 Dec 2018 11:18)  metFORMIN 850 mg oral tablet: orally 2 times a day (with meals) (26 Dec 2018 11:18)  pantoprazole 40 mg oral delayed release tablet: 1 tab(s) orally once a day (before a meal) (26 Dec 2018 11:18)      MEDICATIONS  (STANDING):  aspirin  chewable 81 milliGRAM(s) Oral daily  atorvastatin 80 milliGRAM(s) Oral at bedtime  carvedilol 25 milliGRAM(s) Oral every 12 hours  chlorhexidine 2% Cloths 1 Application(s) Topical daily  clopidogrel Tablet 75 milliGRAM(s) Oral daily  dextrose 50% Injectable 12.5 Gram(s) IV Push once  dextrose 50% Injectable 25 Gram(s) IV Push once  dextrose 50% Injectable 25 Gram(s) IV Push once  docusate sodium 100 milliGRAM(s) Oral two times a day  DULoxetine 60 milliGRAM(s) Oral daily  epoetin sara Injectable 98545 Unit(s) SubCutaneous <User Schedule>  gabapentin 100 milliGRAM(s) Oral every 8 hours  heparin  Injectable 5000 Unit(s) SubCutaneous every 8 hours  insulin lispro (HumaLOG) corrective regimen sliding scale   SubCutaneous three times a day before meals  iron sucrose IVPB 250 milliGRAM(s) IV Intermittent <User Schedule>  losartan 50 milliGRAM(s) Oral daily  pantoprazole    Tablet 40 milliGRAM(s) Oral before breakfast  torsemide 20 milliGRAM(s) Oral every 12 hours    MEDICATIONS  (PRN):  acetaminophen   Tablet .. 650 milliGRAM(s) Oral every 6 hours PRN Mild Pain (1 - 3)  dextrose 40% Gel 15 Gram(s) Oral once PRN Blood Glucose LESS THAN 70 milliGRAM(s)/deciliter  glucagon  Injectable 1 milliGRAM(s) IntraMuscular once PRN Glucose LESS THAN 70 milligrams/deciliter  melatonin 3 milliGRAM(s) Oral at bedtime PRN Insomnia      Allergies    Accupril (Other)    Intolerances      PHYSICAL EXAM:   Vital Signs:  Vital Signs Last 24 Hrs  T(C): 36.5 (2019 11:00), Max: 37.1 (2019 22:34)  T(F): 97.7 (2019 11:00), Max: 98.8 (2019 22:34)  HR: 63 (2019 11:00) (62 - 68)  BP: 108/67 (2019 11:00) (108/67 - 150/71)  BP(mean): 95 (2019 22:00) (89 - 102)  RR: 18 (2019 11:00) (18 - 30)  SpO2: 99% (2019 22:34) (95% - 100%)  Daily     Daily     GENERAL:  no distress  HEENT:  NC/AT,  anicteric  CHEST:   no increased effort, breath sounds clear  HEART:  Regular rhythm  ABDOMEN:  Soft, non-tender, non-distended, normoactive bowel sounds,  no masses ,no hepato-splenomegaly, no signs of chronic liver disease  EXTEREMITIES:  no cyanosis      LABS:                        9.0    5.8   )-----------( 128      ( 2019 10:24 )             28.3       Hemoglobin: 9.0 g/dL (19 @ 10:24)  Hemoglobin: 8.5 g/dL (19 @ 04:42)  Hemoglobin: 9.1 g/dL (01-15-19 @ 14:22)  Hemoglobin: 8.9 g/dL (01-15-19 @ 04:31)  Hemoglobin: 9.1 g/dL (19 @ 20:52)          140  |  105  |  33.0<H>  ----------------------------<  156<H>  4.3   |  27.0  |  0.97    Ca    8.0<L>      2019 04:42  Phos  3.7       Mg     2.4         TPro  6.0<L>  /  Alb  2.9<L>  /  TBili  1.0  /  DBili  x   /  AST  37<H>  /  ALT  34<H>  /  AlkPhos  113        INR: 1.16 ratio (19 @ 04:42)  INR: 1.24 ratio (01-15-19 @ 04:31)      Alanine Aminotransferase (ALT/SGPT): 34 U/L (19 @ 04:42)  Aspartate Aminotransferase (AST/SGOT): 37 U/L (19 @ 04:42)  Alkaline Phosphatase, Serum: 113 U/L (19 @ 04:42)

## 2019-01-17 NOTE — PHYSICAL THERAPY INITIAL EVALUATION ADULT - ADDITIONAL COMMENTS
per patient she has ~10-15 steps at home with Golden HRs. pt has family with her all the time reporting she is blind and ambulates with a RW. Family present for supervision when she does the stairs.

## 2019-01-18 ENCOUNTER — TRANSCRIPTION ENCOUNTER (OUTPATIENT)
Age: 64
End: 2019-01-18

## 2019-01-18 VITALS
TEMPERATURE: 98 F | DIASTOLIC BLOOD PRESSURE: 60 MMHG | SYSTOLIC BLOOD PRESSURE: 108 MMHG | RESPIRATION RATE: 20 BRPM | HEART RATE: 61 BPM

## 2019-01-18 LAB
24R-OH-CALCIDIOL SERPL-MCNC: 29.2 NG/ML — LOW (ref 30–80)
ANION GAP SERPL CALC-SCNC: 10 MMOL/L — SIGNIFICANT CHANGE UP (ref 5–17)
BASOPHILS # BLD AUTO: 0 K/UL — SIGNIFICANT CHANGE UP (ref 0–0.2)
BASOPHILS NFR BLD AUTO: 0.3 % — SIGNIFICANT CHANGE UP (ref 0–2)
BUN SERPL-MCNC: 34 MG/DL — HIGH (ref 8–20)
CALCIUM SERPL-MCNC: 8.2 MG/DL — LOW (ref 8.6–10.2)
CHLORIDE SERPL-SCNC: 99 MMOL/L — SIGNIFICANT CHANGE UP (ref 98–107)
CO2 SERPL-SCNC: 27 MMOL/L — SIGNIFICANT CHANGE UP (ref 22–29)
CREAT SERPL-MCNC: 1.23 MG/DL — SIGNIFICANT CHANGE UP (ref 0.5–1.3)
EOSINOPHIL # BLD AUTO: 0.3 K/UL — SIGNIFICANT CHANGE UP (ref 0–0.5)
EOSINOPHIL NFR BLD AUTO: 5.4 % — SIGNIFICANT CHANGE UP (ref 0–6)
GLUCOSE BLDC GLUCOMTR-MCNC: 140 MG/DL — HIGH (ref 70–99)
GLUCOSE BLDC GLUCOMTR-MCNC: 178 MG/DL — HIGH (ref 70–99)
GLUCOSE BLDC GLUCOMTR-MCNC: 246 MG/DL — HIGH (ref 70–99)
GLUCOSE SERPL-MCNC: 135 MG/DL — HIGH (ref 70–115)
HCT VFR BLD CALC: 28.1 % — LOW (ref 37–47)
HGB BLD-MCNC: 8.9 G/DL — LOW (ref 12–16)
LYMPHOCYTES # BLD AUTO: 1.1 K/UL — SIGNIFICANT CHANGE UP (ref 1–4.8)
LYMPHOCYTES # BLD AUTO: 17.9 % — LOW (ref 20–55)
MAGNESIUM SERPL-MCNC: 2.2 MG/DL — SIGNIFICANT CHANGE UP (ref 1.6–2.6)
MCHC RBC-ENTMCNC: 29 PG — SIGNIFICANT CHANGE UP (ref 27–31)
MCHC RBC-ENTMCNC: 31.7 G/DL — LOW (ref 32–36)
MCV RBC AUTO: 91.5 FL — SIGNIFICANT CHANGE UP (ref 81–99)
MONOCYTES # BLD AUTO: 0.5 K/UL — SIGNIFICANT CHANGE UP (ref 0–0.8)
MONOCYTES NFR BLD AUTO: 7.7 % — SIGNIFICANT CHANGE UP (ref 3–10)
NEUTROPHILS # BLD AUTO: 4.3 K/UL — SIGNIFICANT CHANGE UP (ref 1.8–8)
NEUTROPHILS NFR BLD AUTO: 68.7 % — SIGNIFICANT CHANGE UP (ref 37–73)
PHOSPHATE SERPL-MCNC: 3.4 MG/DL — SIGNIFICANT CHANGE UP (ref 2.4–4.7)
PLATELET # BLD AUTO: 131 K/UL — LOW (ref 150–400)
POTASSIUM SERPL-MCNC: 4.9 MMOL/L — SIGNIFICANT CHANGE UP (ref 3.5–5.3)
POTASSIUM SERPL-SCNC: 4.9 MMOL/L — SIGNIFICANT CHANGE UP (ref 3.5–5.3)
RBC # BLD: 3.07 M/UL — LOW (ref 4.4–5.2)
RBC # FLD: 18 % — HIGH (ref 11–15.6)
SODIUM SERPL-SCNC: 136 MMOL/L — SIGNIFICANT CHANGE UP (ref 135–145)
WBC # BLD: 6.3 K/UL — SIGNIFICANT CHANGE UP (ref 4.8–10.8)
WBC # FLD AUTO: 6.3 K/UL — SIGNIFICANT CHANGE UP (ref 4.8–10.8)

## 2019-01-18 PROCEDURE — 85014 HEMATOCRIT: CPT

## 2019-01-18 PROCEDURE — 83540 ASSAY OF IRON: CPT

## 2019-01-18 PROCEDURE — 82306 VITAMIN D 25 HYDROXY: CPT

## 2019-01-18 PROCEDURE — 93306 TTE W/DOPPLER COMPLETE: CPT

## 2019-01-18 PROCEDURE — 96374 THER/PROPH/DIAG INJ IV PUSH: CPT | Mod: XU

## 2019-01-18 PROCEDURE — 82803 BLOOD GASES ANY COMBINATION: CPT

## 2019-01-18 PROCEDURE — P9011: CPT

## 2019-01-18 PROCEDURE — 83550 IRON BINDING TEST: CPT

## 2019-01-18 PROCEDURE — 86923 COMPATIBILITY TEST ELECTRIC: CPT

## 2019-01-18 PROCEDURE — 76942 ECHO GUIDE FOR BIOPSY: CPT

## 2019-01-18 PROCEDURE — C1894: CPT

## 2019-01-18 PROCEDURE — 74177 CT ABD & PELVIS W/CONTRAST: CPT

## 2019-01-18 PROCEDURE — 71045 X-RAY EXAM CHEST 1 VIEW: CPT

## 2019-01-18 PROCEDURE — 74174 CTA ABD&PLVS W/CONTRAST: CPT

## 2019-01-18 PROCEDURE — 86985 SPLIT BLOOD OR PRODUCTS: CPT

## 2019-01-18 PROCEDURE — 82330 ASSAY OF CALCIUM: CPT

## 2019-01-18 PROCEDURE — 99291 CRITICAL CARE FIRST HOUR: CPT | Mod: 25

## 2019-01-18 PROCEDURE — 84295 ASSAY OF SERUM SODIUM: CPT

## 2019-01-18 PROCEDURE — 85027 COMPLETE CBC AUTOMATED: CPT

## 2019-01-18 PROCEDURE — 82728 ASSAY OF FERRITIN: CPT

## 2019-01-18 PROCEDURE — 84100 ASSAY OF PHOSPHORUS: CPT

## 2019-01-18 PROCEDURE — 86900 BLOOD TYPING SEROLOGIC ABO: CPT

## 2019-01-18 PROCEDURE — 83605 ASSAY OF LACTIC ACID: CPT

## 2019-01-18 PROCEDURE — P9059: CPT

## 2019-01-18 PROCEDURE — 86850 RBC ANTIBODY SCREEN: CPT

## 2019-01-18 PROCEDURE — C1769: CPT

## 2019-01-18 PROCEDURE — 82947 ASSAY GLUCOSE BLOOD QUANT: CPT

## 2019-01-18 PROCEDURE — 85730 THROMBOPLASTIN TIME PARTIAL: CPT

## 2019-01-18 PROCEDURE — 83735 ASSAY OF MAGNESIUM: CPT

## 2019-01-18 PROCEDURE — 36430 TRANSFUSION BLD/BLD COMPNT: CPT

## 2019-01-18 PROCEDURE — 85610 PROTHROMBIN TIME: CPT

## 2019-01-18 PROCEDURE — 88305 TISSUE EXAM BY PATHOLOGIST: CPT

## 2019-01-18 PROCEDURE — 36415 COLL VENOUS BLD VENIPUNCTURE: CPT

## 2019-01-18 PROCEDURE — 84132 ASSAY OF SERUM POTASSIUM: CPT

## 2019-01-18 PROCEDURE — 93005 ELECTROCARDIOGRAM TRACING: CPT

## 2019-01-18 PROCEDURE — 82962 GLUCOSE BLOOD TEST: CPT

## 2019-01-18 PROCEDURE — 83970 ASSAY OF PARATHORMONE: CPT

## 2019-01-18 PROCEDURE — 82310 ASSAY OF CALCIUM: CPT

## 2019-01-18 PROCEDURE — 84466 ASSAY OF TRANSFERRIN: CPT

## 2019-01-18 PROCEDURE — P9037: CPT

## 2019-01-18 PROCEDURE — 75894 X-RAYS TRANSCATH THERAPY: CPT

## 2019-01-18 PROCEDURE — 96375 TX/PRO/DX INJ NEW DRUG ADDON: CPT

## 2019-01-18 PROCEDURE — 80053 COMPREHEN METABOLIC PANEL: CPT

## 2019-01-18 PROCEDURE — 82435 ASSAY OF BLOOD CHLORIDE: CPT

## 2019-01-18 PROCEDURE — 96376 TX/PRO/DX INJ SAME DRUG ADON: CPT

## 2019-01-18 PROCEDURE — 86901 BLOOD TYPING SEROLOGIC RH(D): CPT

## 2019-01-18 PROCEDURE — C1760: CPT

## 2019-01-18 PROCEDURE — P9016: CPT

## 2019-01-18 PROCEDURE — 80048 BASIC METABOLIC PNL TOTAL CA: CPT

## 2019-01-18 RX ORDER — ERGOCALCIFEROL 1.25 MG/1
50000 CAPSULE ORAL
Qty: 0 | Refills: 0 | Status: DISCONTINUED | OUTPATIENT
Start: 2019-01-18 | End: 2019-01-18

## 2019-01-18 RX ORDER — LANOLIN ALCOHOL/MO/W.PET/CERES
1 CREAM (GRAM) TOPICAL
Qty: 0 | Refills: 0 | DISCHARGE
Start: 2019-01-18

## 2019-01-18 RX ORDER — SODIUM CHLORIDE 9 MG/ML
250 INJECTION, SOLUTION INTRAVENOUS ONCE
Qty: 0 | Refills: 0 | Status: COMPLETED | OUTPATIENT
Start: 2019-01-18 | End: 2019-01-18

## 2019-01-18 RX ADMIN — LOSARTAN POTASSIUM 50 MILLIGRAM(S): 100 TABLET, FILM COATED ORAL at 05:11

## 2019-01-18 RX ADMIN — Medication 20 MILLIGRAM(S): at 05:11

## 2019-01-18 RX ADMIN — Medication 4: at 11:18

## 2019-01-18 RX ADMIN — Medication 100 MILLIGRAM(S): at 05:11

## 2019-01-18 RX ADMIN — HEPARIN SODIUM 5000 UNIT(S): 5000 INJECTION INTRAVENOUS; SUBCUTANEOUS at 14:20

## 2019-01-18 RX ADMIN — CARVEDILOL PHOSPHATE 25 MILLIGRAM(S): 80 CAPSULE, EXTENDED RELEASE ORAL at 17:31

## 2019-01-18 RX ADMIN — GABAPENTIN 100 MILLIGRAM(S): 400 CAPSULE ORAL at 05:11

## 2019-01-18 RX ADMIN — GABAPENTIN 100 MILLIGRAM(S): 400 CAPSULE ORAL at 14:20

## 2019-01-18 RX ADMIN — Medication 650 MILLIGRAM(S): at 14:20

## 2019-01-18 RX ADMIN — DULOXETINE HYDROCHLORIDE 60 MILLIGRAM(S): 30 CAPSULE, DELAYED RELEASE ORAL at 11:18

## 2019-01-18 RX ADMIN — HEPARIN SODIUM 5000 UNIT(S): 5000 INJECTION INTRAVENOUS; SUBCUTANEOUS at 05:10

## 2019-01-18 RX ADMIN — Medication 81 MILLIGRAM(S): at 11:18

## 2019-01-18 RX ADMIN — PANTOPRAZOLE SODIUM 40 MILLIGRAM(S): 20 TABLET, DELAYED RELEASE ORAL at 05:12

## 2019-01-18 RX ADMIN — Medication 2: at 17:31

## 2019-01-18 RX ADMIN — Medication 100 MILLIGRAM(S): at 17:31

## 2019-01-18 RX ADMIN — CARVEDILOL PHOSPHATE 25 MILLIGRAM(S): 80 CAPSULE, EXTENDED RELEASE ORAL at 05:11

## 2019-01-18 RX ADMIN — IRON SUCROSE 112.5 MILLIGRAM(S): 20 INJECTION, SOLUTION INTRAVENOUS at 18:09

## 2019-01-18 RX ADMIN — ERGOCALCIFEROL 50000 UNIT(S): 1.25 CAPSULE ORAL at 18:09

## 2019-01-18 RX ADMIN — Medication 650 MILLIGRAM(S): at 15:04

## 2019-01-18 RX ADMIN — Medication 20 MILLIGRAM(S): at 17:31

## 2019-01-18 RX ADMIN — SODIUM CHLORIDE 500 MILLILITER(S): 9 INJECTION, SOLUTION INTRAVENOUS at 11:18

## 2019-01-18 NOTE — PROGRESS NOTE ADULT - ASSESSMENT
64yo F with GI bleed that was started on aspirin yesterday and had no blood per rectum nor in her BM.    -possibly restart Plavix today  - Monitor blood in stool.   - possible discharge today.

## 2019-01-18 NOTE — DISCHARGE NOTE ADULT - PLAN OF CARE
return to baseline function Please follow up with your PMD, GI and your Cardiologist.  They will decide when you can resume taking Clopidodgrel.  Please return to the ED if any signs of Bleeding again when you have a bowel movement.

## 2019-01-18 NOTE — PROGRESS NOTE ADULT - PROVIDER SPECIALTY LIST ADULT
Gastroenterology
Nephrology
SICU
Surgery
Surgery
Nephrology

## 2019-01-18 NOTE — DISCHARGE NOTE ADULT - PATIENT PORTAL LINK FT
You can access the [facility] Patient Portal, offered by [facility], by registering with the following website: [email or web address]

## 2019-01-18 NOTE — PROGRESS NOTE ADULT - ATTENDING COMMENTS
Tolerating diet. No further bloody bowel movements. Restarted ASA. A bdomen is soft, ND, NT. Hemodynamically stable. Hgb stable. Dispo planning with plans for patient to follow up with cardiology to reinitiate plavix.

## 2019-01-18 NOTE — PROGRESS NOTE ADULT - SUBJECTIVE AND OBJECTIVE BOX
NEPHROLOGY INTERVAL HPI/OVERNIGHT EVENTS:    Examined earlier    MEDICATIONS  (STANDING):  aspirin  chewable 81 milliGRAM(s) Oral daily  atorvastatin 80 milliGRAM(s) Oral at bedtime  carvedilol 25 milliGRAM(s) Oral every 12 hours  chlorhexidine 2% Cloths 1 Application(s) Topical daily  dextrose 50% Injectable 12.5 Gram(s) IV Push once  dextrose 50% Injectable 25 Gram(s) IV Push once  dextrose 50% Injectable 25 Gram(s) IV Push once  docusate sodium 100 milliGRAM(s) Oral two times a day  DULoxetine 60 milliGRAM(s) Oral daily  epoetin sara Injectable 56510 Unit(s) SubCutaneous <User Schedule>  gabapentin 100 milliGRAM(s) Oral every 8 hours  heparin  Injectable 5000 Unit(s) SubCutaneous every 8 hours  insulin lispro (HumaLOG) corrective regimen sliding scale   SubCutaneous three times a day before meals  iron sucrose IVPB 250 milliGRAM(s) IV Intermittent <User Schedule>  losartan 50 milliGRAM(s) Oral daily  pantoprazole    Tablet 40 milliGRAM(s) Oral before breakfast  torsemide 20 milliGRAM(s) Oral every 12 hours    MEDICATIONS  (PRN):  acetaminophen   Tablet .. 650 milliGRAM(s) Oral every 6 hours PRN Mild Pain (1 - 3)  dextrose 40% Gel 15 Gram(s) Oral once PRN Blood Glucose LESS THAN 70 milliGRAM(s)/deciliter  glucagon  Injectable 1 milliGRAM(s) IntraMuscular once PRN Glucose LESS THAN 70 milligrams/deciliter  melatonin 3 milliGRAM(s) Oral at bedtime PRN Insomnia      Allergies    Accupril (Other)    Intolerances        Vital Signs Last 24 Hrs  T(C): 36.7 (2019 11:00), Max: 36.7 (2019 11:00)  T(F): 98.1 (2019 11:00), Max: 98.1 (2019 11:00)  HR: 61 (2019 11:00) (61 - 62)  BP: 109/62 (2019 11:00) (109/62 - 124/66)  BP(mean): 85 (2019 05:08) (85 - 85)  RR: 18 (2019 11:00) (18 - 18)  SpO2: 99% (2019 05:08) (97% - 99%)  Daily     Daily Weight in k.8 (2019 14:59)    PHYSICAL EXAM:  Comfortable  Cardiovascular: Normal S1 S2, No rub  Respiratory: Decreased BS at bases but clear	  Neuro: A & O x 3; non focal  Gastrointestinal:  Soft, Non-tender, + BS	  Extremities: trace LE edema    LABS:                        8.9    6.3   )-----------( 131      ( 2019 07:55 )             28.1         136  |  99  |  34.0<H>  ----------------------------<  135<H>  4.9   |  27.0  |  1.23    Ca    8.2<L>      2019 07:54  Phos  3.4       Mg     2.2               Phosphorus Level, Serum: 3.4 mg/dL ( @ 07:54)  Magnesium, Serum: 2.2 mg/dL ( @ 07:54)          RADIOLOGY & ADDITIONAL TESTS:

## 2019-01-18 NOTE — DISCHARGE NOTE ADULT - CARE PROVIDER_API CALL
Ward Zavala), Gastroenterology; Internal Medicine  [address] [place] [address]  Phone: [phone]  Fax: [phone]    [place]), Cardiovascular Disease; Internal Medicine; Interventional Cardiology  [address]  [place] [address]  Phone: [phone]  Fax: [phone]

## 2019-01-18 NOTE — DISCHARGE NOTE ADULT - MEDICATION SUMMARY - MEDICATIONS TO TAKE
I will START or STAY ON the medications listed below when I get home from the hospital:    aspirin 81 mg oral tablet, chewable  -- 1 tab(s) by mouth once a day  -- Indication: For PAD (peripheral artery disease)    losartan 50 mg oral tablet  -- 1 tab(s) by mouth once a day  -- Indication: For Hypertension    isosorbide mononitrate 60 mg oral tablet, extended release  -- 1 tab(s) by mouth once a day (in the morning)  -- Indication: For Cardiac    gabapentin 100 mg oral capsule  -- 1 cap(s) by mouth 3 times a day  -- Indication: For Neuropathy    DULoxetine 60 mg oral delayed release capsule  -- 1 cap(s) by mouth once a day  -- Indication: For Depression    metFORMIN 850 mg oral tablet  -- orally 2 times a day (with meals)  -- Indication: For Diabetes    atorvastatin 80 mg oral tablet  -- 1 tab(s) by mouth once a day (at bedtime)  -- Indication: For HLD    carvedilol 25 mg oral tablet  -- 1 tab(s) by mouth every 12 hours  -- Indication: For Cardiac    torsemide 20 mg oral tablet  -- 1 tab(s) by mouth every 12 hours  -- Indication: For Cardiac    ferrous sulfate 325 mg (65 mg elemental iron) oral tablet  -- 1 tab(s) by mouth 2 times a day  -- Indication: For Anemia    docusate sodium 100 mg oral capsule  -- 1 cap(s) by mouth 2 times a day  -- Indication: For constipation    magnesium oxide 400 mg (240 mg elemental magnesium) oral tablet  -- 1 tab(s) by mouth once a day  -- Indication: For constipation    melatonin 3 mg oral tablet  -- 1 tab(s) by mouth once a day (at bedtime), As needed, Insomnia  -- Indication: For insomnia    pantoprazole 40 mg oral delayed release tablet  -- 1 tab(s) by mouth once a day (before a meal)  -- Indication: For GERD

## 2019-01-18 NOTE — DISCHARGE NOTE ADULT - HOSPITAL COURSE
63F PMHx 63F PMHx HTN, CAD s/p CABGx1, PVD, HLD, DM, Blind, Retinal Detachment who was admitted with LGIB.  GI was consulted and pt. underwent EGD and Colonoscopy with no source identified.  HGB has beeen stable.  Pt.'s home aspirin dose has been restarted, Plavix remains on hold.  Cardiology has been notified and they will restart the pt. on it as appropriate when she follows up in the office.  She is to be discharged home with homecare at this time.

## 2019-01-18 NOTE — PROGRESS NOTE ADULT - SUBJECTIVE AND OBJECTIVE BOX
HPI/OVERNIGHT EVENTS: 64yo F with GI bleed examined at bedside and found resting in no acute distress. No overnight events. Patient is tolerating regular diet, passing and voiding. Yesterday she had a nonbloody bowel movements.  Patient was started on aspirin yesterday. Patient has no active complaints. Denies fever, chills, nausea, vomiting, diarrhea, constipation, chest pain, and shortness of breath.     Vital Signs Last 24 Hrs  T(C): 36.6 (18 Jan 2019 05:08), Max: 36.8 (17 Jan 2019 16:00)  T(F): 97.8 (18 Jan 2019 05:08), Max: 98.3 (17 Jan 2019 16:00)  HR: 62 (18 Jan 2019 05:08) (61 - 68)  BP: 124/66 (18 Jan 2019 05:08) (108/67 - 126/75)  BP(mean): 85 (18 Jan 2019 05:08) (85 - 85)  RR: 18 (18 Jan 2019 05:08) (18 - 20)  SpO2: 99% (18 Jan 2019 05:08) (97% - 99%)    I&O's Detail    17 Jan 2019 07:01  -  18 Jan 2019 07:00  --------------------------------------------------------  IN:    Oral Fluid: 720 mL  Total IN: 720 mL    OUT:    Stool: 1 mL    Voided: 330 mL  Total OUT: 331 mL    Total NET: 389 mL          Constitutional: patient resting comfortably in bed, in no acute distress  HEENT: EOMI / PERRL   Neck: No JVD, full ROM without pain  Respiratory: CTAB, respirations are unlabored, no accessory muscle use, no conversational dyspnea  Cardiovascular: regular rate & rhythm  Gastrointestinal: Abdomen soft, non-tender, non-distended, no rebound tenderness / guarding    LABS:                        9.0    5.8   )-----------( 128      ( 17 Jan 2019 10:24 )             28.3                 MEDICATIONS  (STANDING):  aspirin  chewable 81 milliGRAM(s) Oral daily  atorvastatin 80 milliGRAM(s) Oral at bedtime  carvedilol 25 milliGRAM(s) Oral every 12 hours  chlorhexidine 2% Cloths 1 Application(s) Topical daily  dextrose 50% Injectable 12.5 Gram(s) IV Push once  dextrose 50% Injectable 25 Gram(s) IV Push once  dextrose 50% Injectable 25 Gram(s) IV Push once  docusate sodium 100 milliGRAM(s) Oral two times a day  DULoxetine 60 milliGRAM(s) Oral daily  epoetin sara Injectable 66578 Unit(s) SubCutaneous <User Schedule>  gabapentin 100 milliGRAM(s) Oral every 8 hours  heparin  Injectable 5000 Unit(s) SubCutaneous every 8 hours  insulin lispro (HumaLOG) corrective regimen sliding scale   SubCutaneous three times a day before meals  iron sucrose IVPB 250 milliGRAM(s) IV Intermittent <User Schedule>  losartan 50 milliGRAM(s) Oral daily  pantoprazole    Tablet 40 milliGRAM(s) Oral before breakfast  torsemide 20 milliGRAM(s) Oral every 12 hours    MEDICATIONS  (PRN):  acetaminophen   Tablet .. 650 milliGRAM(s) Oral every 6 hours PRN Mild Pain (1 - 3)  dextrose 40% Gel 15 Gram(s) Oral once PRN Blood Glucose LESS THAN 70 milliGRAM(s)/deciliter  glucagon  Injectable 1 milliGRAM(s) IntraMuscular once PRN Glucose LESS THAN 70 milligrams/deciliter  melatonin 3 milliGRAM(s) Oral at bedtime PRN Insomnia

## 2019-01-18 NOTE — DISCHARGE NOTE ADULT - CARE PLAN
Principal Discharge DX:	Gastrointestinal hemorrhage, unspecified gastrointestinal hemorrhage type  Goal:	return to baseline function  Assessment and plan of treatment:	Please follow up with your PMD, GI and your Cardiologist.  They will decide when you can resume taking Clopidodgrel.  Please return to the ED if any signs of Bleeding again when you have a bowel movement.

## 2019-01-18 NOTE — PROGRESS NOTE ADULT - ASSESSMENT
KEITH has resolved  DM with DM nephropathy + proteinuria  - OK from renal perspective to restart ARB  - avoid potential nephrotoxins    Hypocalcemia:   - iPTH high - 2 HPT   - Vit D low  will give ergocalciferol 50K units q Wk for 3 mo    Anemia: +GI loss  Low Fe stores  - GI w/u in progress  - cont IV Venofer  - cont QUENTIN

## 2019-02-09 LAB
CULTURE RESULTS: SIGNIFICANT CHANGE UP
SPECIMEN SOURCE: SIGNIFICANT CHANGE UP

## 2019-02-10 ENCOUNTER — INPATIENT (INPATIENT)
Facility: HOSPITAL | Age: 64
LOS: 43 days | Discharge: STILL ADMITTED OR ADMINISTRATIVE | DRG: 291 | End: 2019-03-26
Attending: INTERNAL MEDICINE | Admitting: HOSPITALIST
Payer: MEDICARE

## 2019-02-10 VITALS
WEIGHT: 201.94 LBS | HEIGHT: 64 IN | SYSTOLIC BLOOD PRESSURE: 123 MMHG | OXYGEN SATURATION: 95 % | HEART RATE: 63 BPM | TEMPERATURE: 97 F | RESPIRATION RATE: 26 BRPM | DIASTOLIC BLOOD PRESSURE: 79 MMHG

## 2019-02-10 LAB
ALBUMIN SERPL ELPH-MCNC: 3 G/DL — LOW (ref 3.3–5.2)
ALBUMIN SERPL ELPH-MCNC: 3.1 G/DL — LOW (ref 3.3–5.2)
ALP SERPL-CCNC: 209 U/L — HIGH (ref 40–120)
ALP SERPL-CCNC: 225 U/L — HIGH (ref 40–120)
ALT FLD-CCNC: 34 U/L — HIGH
ALT FLD-CCNC: 36 U/L — HIGH
ANION GAP SERPL CALC-SCNC: 8 MMOL/L — SIGNIFICANT CHANGE UP (ref 5–17)
ANION GAP SERPL CALC-SCNC: 9 MMOL/L — SIGNIFICANT CHANGE UP (ref 5–17)
APTT BLD: 30.4 SEC — SIGNIFICANT CHANGE UP (ref 27.5–36.3)
AST SERPL-CCNC: 41 U/L — HIGH
AST SERPL-CCNC: 46 U/L — HIGH
BASOPHILS # BLD AUTO: 0 K/UL — SIGNIFICANT CHANGE UP (ref 0–0.2)
BASOPHILS NFR BLD AUTO: 0.2 % — SIGNIFICANT CHANGE UP (ref 0–2)
BILIRUB SERPL-MCNC: 0.5 MG/DL — SIGNIFICANT CHANGE UP (ref 0.4–2)
BILIRUB SERPL-MCNC: 0.6 MG/DL — SIGNIFICANT CHANGE UP (ref 0.4–2)
BLD GP AB SCN SERPL QL: SIGNIFICANT CHANGE UP
BUN SERPL-MCNC: 55 MG/DL — HIGH (ref 8–20)
BUN SERPL-MCNC: 55 MG/DL — HIGH (ref 8–20)
CALCIUM SERPL-MCNC: 8.2 MG/DL — LOW (ref 8.6–10.2)
CALCIUM SERPL-MCNC: 8.3 MG/DL — LOW (ref 8.6–10.2)
CHLORIDE SERPL-SCNC: 103 MMOL/L — SIGNIFICANT CHANGE UP (ref 98–107)
CHLORIDE SERPL-SCNC: 104 MMOL/L — SIGNIFICANT CHANGE UP (ref 98–107)
CO2 SERPL-SCNC: 24 MMOL/L — SIGNIFICANT CHANGE UP (ref 22–29)
CO2 SERPL-SCNC: 24 MMOL/L — SIGNIFICANT CHANGE UP (ref 22–29)
CREAT SERPL-MCNC: 1.55 MG/DL — HIGH (ref 0.5–1.3)
CREAT SERPL-MCNC: 1.7 MG/DL — HIGH (ref 0.5–1.3)
EOSINOPHIL # BLD AUTO: 0.1 K/UL — SIGNIFICANT CHANGE UP (ref 0–0.5)
EOSINOPHIL NFR BLD AUTO: 2.4 % — SIGNIFICANT CHANGE UP (ref 0–6)
GLUCOSE SERPL-MCNC: 126 MG/DL — HIGH (ref 70–115)
GLUCOSE SERPL-MCNC: 128 MG/DL — HIGH (ref 70–115)
HCT VFR BLD CALC: 30.8 % — LOW (ref 37–47)
HGB BLD-MCNC: 10 G/DL — LOW (ref 12–16)
INR BLD: 1.28 RATIO — HIGH (ref 0.88–1.16)
LIDOCAIN IGE QN: 48 U/L — SIGNIFICANT CHANGE UP (ref 22–51)
LYMPHOCYTES # BLD AUTO: 0.8 K/UL — LOW (ref 1–4.8)
LYMPHOCYTES # BLD AUTO: 16.1 % — LOW (ref 20–55)
MCHC RBC-ENTMCNC: 30.3 PG — SIGNIFICANT CHANGE UP (ref 27–31)
MCHC RBC-ENTMCNC: 32.5 G/DL — SIGNIFICANT CHANGE UP (ref 32–36)
MCV RBC AUTO: 93.3 FL — SIGNIFICANT CHANGE UP (ref 81–99)
MONOCYTES # BLD AUTO: 0.4 K/UL — SIGNIFICANT CHANGE UP (ref 0–0.8)
MONOCYTES NFR BLD AUTO: 7.1 % — SIGNIFICANT CHANGE UP (ref 3–10)
NEUTROPHILS # BLD AUTO: 3.8 K/UL — SIGNIFICANT CHANGE UP (ref 1.8–8)
NEUTROPHILS NFR BLD AUTO: 74.2 % — HIGH (ref 37–73)
NT-PROBNP SERPL-SCNC: 7854 PG/ML — HIGH (ref 0–300)
PLATELET # BLD AUTO: 151 K/UL — SIGNIFICANT CHANGE UP (ref 150–400)
POTASSIUM SERPL-MCNC: 7.8 MMOL/L — CRITICAL HIGH (ref 3.5–5.3)
POTASSIUM SERPL-MCNC: 7.9 MMOL/L — CRITICAL HIGH (ref 3.5–5.3)
POTASSIUM SERPL-SCNC: 7.8 MMOL/L — CRITICAL HIGH (ref 3.5–5.3)
POTASSIUM SERPL-SCNC: 7.9 MMOL/L — CRITICAL HIGH (ref 3.5–5.3)
PROT SERPL-MCNC: 7.3 G/DL — SIGNIFICANT CHANGE UP (ref 6.6–8.7)
PROT SERPL-MCNC: 7.6 G/DL — SIGNIFICANT CHANGE UP (ref 6.6–8.7)
PROTHROM AB SERPL-ACNC: 14.8 SEC — HIGH (ref 10–12.9)
RBC # BLD: 3.3 M/UL — LOW (ref 4.4–5.2)
RBC # FLD: 17.5 % — HIGH (ref 11–15.6)
SODIUM SERPL-SCNC: 136 MMOL/L — SIGNIFICANT CHANGE UP (ref 135–145)
SODIUM SERPL-SCNC: 136 MMOL/L — SIGNIFICANT CHANGE UP (ref 135–145)
TROPONIN T SERPL-MCNC: 0.08 NG/ML — HIGH (ref 0–0.06)
TYPE + AB SCN PNL BLD: SIGNIFICANT CHANGE UP
WBC # BLD: 5.1 K/UL — SIGNIFICANT CHANGE UP (ref 4.8–10.8)
WBC # FLD AUTO: 5.1 K/UL — SIGNIFICANT CHANGE UP (ref 4.8–10.8)

## 2019-02-10 PROCEDURE — 99285 EMERGENCY DEPT VISIT HI MDM: CPT

## 2019-02-10 PROCEDURE — 71045 X-RAY EXAM CHEST 1 VIEW: CPT | Mod: 26

## 2019-02-10 NOTE — ED PROVIDER NOTE - OBJECTIVE STATEMENT
64 y/o F with PMHx of DM, glaucoma, PVD, HLD, HTN, legal blindness and PAD presents to ED c/o shortness of breath, onset 2 months ago, becoming worse over the past 2 days, exacerbated with exertion. She states she feels she is unable to speak secondary to the SOB prompting her to come to the ED tonight. Is also having L sided chest pain 9/10 in severity, that radiates to the L upper back, onset 1 month ago and feels she is "filling up with fluid" specifically in her abdomen and JERRY feet. Has gained 40 pounds over the past 2 months unintentionally. Denies fever. Denies HA or neck pain. No cough. No abd pain or n/v/d. No urinary f/u/d. No back pain. No motor or sensory deficits. Denies illicit drug use. No recent travel. No rash. No other acute issues symptoms or concerns. 62 y/o F with PMHx of DM, glaucoma, PVD, HLD, HTN, legal blindness and PAD presents to ED c/o shortness of breath, onset 2 months ago, becoming worse over the past 2 days, exacerbated with exertion. She states she feels she is unable to speak secondary to the SOB prompting her to come to the ED tonight. Is also having L sided chest pain 9/10 in severity, that radiates to the L upper back, onset 1 month ago and feels she is "filling up with fluid" specifically in her abdomen and JERRY feet. Has gained 40 pounds over the past 2 months unintentionally. Last saw her PMD on Friday, was given Lasix. Denies fever. Denies HA or neck pain. No cough. No abd pain or n/v/d. No urinary f/u/d. No back pain. No motor or sensory deficits. Denies illicit drug use. No recent travel. No rash. No other acute issues symptoms or concerns.

## 2019-02-10 NOTE — ED ADULT NURSE NOTE - NSIMPLEMENTINTERV_GEN_ALL_ED
Implemented All Universal Safety Interventions:  [place]oyd to call system. Call bell, personal items and telephone within reach. Instruct patient to call for assistance. Room bathroom lighting operational. Non-slip footwear when patient is off stretcher. Physically safe environment: no spills, clutter or unnecessary equipment. Stretcher in lowest position, wheels locked, appropriate side rails in place.

## 2019-02-10 NOTE — ED ADULT TRIAGE NOTE - CHIEF COMPLAINT QUOTE
Patient brought in by ambulance A/Ox3, c/o SOB and left side body aches for 2 days, states she is retaining fluid.

## 2019-02-10 NOTE — ED ADULT NURSE NOTE - OBJECTIVE STATEMENT
Pt c/o difficulty breathing and SOB x's two days with pain to left flank, denies N/V/D, denies chest pain, pt with firm and distended abd, B/L lower extremities to be found with non-pitting edema

## 2019-02-10 NOTE — ED PROVIDER NOTE - CARDIAC, MLM
Normal rate, regular rhythm.  Heart sounds distant.  No murmurs, rubs or gallops. 4+ pedal edema to JERRY LE

## 2019-02-11 DIAGNOSIS — I25.10 ATHEROSCLEROTIC HEART DISEASE OF NATIVE CORONARY ARTERY WITHOUT ANGINA PECTORIS: ICD-10-CM

## 2019-02-11 DIAGNOSIS — E11.9 TYPE 2 DIABETES MELLITUS WITHOUT COMPLICATIONS: ICD-10-CM

## 2019-02-11 DIAGNOSIS — I50.9 HEART FAILURE, UNSPECIFIED: ICD-10-CM

## 2019-02-11 DIAGNOSIS — I50.31 ACUTE DIASTOLIC (CONGESTIVE) HEART FAILURE: ICD-10-CM

## 2019-02-11 DIAGNOSIS — E87.5 HYPERKALEMIA: ICD-10-CM

## 2019-02-11 LAB
ANION GAP SERPL CALC-SCNC: 10 MMOL/L — SIGNIFICANT CHANGE UP (ref 5–17)
BUN SERPL-MCNC: 56 MG/DL — HIGH (ref 8–20)
CALCIUM SERPL-MCNC: 8.5 MG/DL — LOW (ref 8.6–10.2)
CHLORIDE SERPL-SCNC: 103 MMOL/L — SIGNIFICANT CHANGE UP (ref 98–107)
CO2 SERPL-SCNC: 26 MMOL/L — SIGNIFICANT CHANGE UP (ref 22–29)
CREAT SERPL-MCNC: 1.66 MG/DL — HIGH (ref 0.5–1.3)
GAS PNL BLDA: SIGNIFICANT CHANGE UP
GLUCOSE BLDC GLUCOMTR-MCNC: 121 MG/DL — HIGH (ref 70–99)
GLUCOSE BLDC GLUCOMTR-MCNC: 135 MG/DL — HIGH (ref 70–99)
GLUCOSE BLDC GLUCOMTR-MCNC: 135 MG/DL — HIGH (ref 70–99)
GLUCOSE BLDC GLUCOMTR-MCNC: 92 MG/DL — SIGNIFICANT CHANGE UP (ref 70–99)
GLUCOSE SERPL-MCNC: 120 MG/DL — HIGH (ref 70–115)
HCV AB S/CO SERPL IA: 0.12 S/CO — SIGNIFICANT CHANGE UP
HCV AB SERPL-IMP: SIGNIFICANT CHANGE UP
POTASSIUM SERPL-MCNC: 6.6 MMOL/L — CRITICAL HIGH (ref 3.5–5.3)
POTASSIUM SERPL-SCNC: 6.6 MMOL/L — CRITICAL HIGH (ref 3.5–5.3)
SODIUM SERPL-SCNC: 139 MMOL/L — SIGNIFICANT CHANGE UP (ref 135–145)
TROPONIN T SERPL-MCNC: 0.1 NG/ML — HIGH (ref 0–0.06)

## 2019-02-11 PROCEDURE — 99223 1ST HOSP IP/OBS HIGH 75: CPT

## 2019-02-11 PROCEDURE — 99232 SBSQ HOSP IP/OBS MODERATE 35: CPT

## 2019-02-11 PROCEDURE — 93010 ELECTROCARDIOGRAM REPORT: CPT | Mod: 77

## 2019-02-11 PROCEDURE — 93010 ELECTROCARDIOGRAM REPORT: CPT

## 2019-02-11 PROCEDURE — 12345: CPT | Mod: NC

## 2019-02-11 RX ORDER — SODIUM BICARBONATE 1 MEQ/ML
0.02 SYRINGE (ML) INTRAVENOUS
Qty: 50 | Refills: 0 | Status: DISCONTINUED | OUTPATIENT
Start: 2019-02-11 | End: 2019-02-11

## 2019-02-11 RX ORDER — FLUDROCORTISONE ACETATE 0.1 MG/1
0.1 TABLET ORAL ONCE
Qty: 0 | Refills: 0 | Status: COMPLETED | OUTPATIENT
Start: 2019-02-11 | End: 2019-02-11

## 2019-02-11 RX ORDER — SODIUM POLYSTYRENE SULFONATE 4.1 MEQ/G
30 POWDER, FOR SUSPENSION ORAL ONCE
Qty: 0 | Refills: 0 | Status: COMPLETED | OUTPATIENT
Start: 2019-02-11 | End: 2019-02-11

## 2019-02-11 RX ORDER — DEXTROSE 50 % IN WATER 50 %
15 SYRINGE (ML) INTRAVENOUS ONCE
Qty: 0 | Refills: 0 | Status: DISCONTINUED | OUTPATIENT
Start: 2019-02-11 | End: 2019-03-26

## 2019-02-11 RX ORDER — FUROSEMIDE 40 MG
40 TABLET ORAL EVERY 12 HOURS
Qty: 0 | Refills: 0 | Status: DISCONTINUED | OUTPATIENT
Start: 2019-02-11 | End: 2019-02-14

## 2019-02-11 RX ORDER — ALBUTEROL 90 UG/1
10 AEROSOL, METERED ORAL ONCE
Qty: 0 | Refills: 0 | Status: DISCONTINUED | OUTPATIENT
Start: 2019-02-11 | End: 2019-03-26

## 2019-02-11 RX ORDER — ATORVASTATIN CALCIUM 80 MG/1
80 TABLET, FILM COATED ORAL AT BEDTIME
Qty: 0 | Refills: 0 | Status: DISCONTINUED | OUTPATIENT
Start: 2019-02-11 | End: 2019-03-26

## 2019-02-11 RX ORDER — MORPHINE SULFATE 50 MG/1
4 CAPSULE, EXTENDED RELEASE ORAL ONCE
Qty: 0 | Refills: 0 | Status: DISCONTINUED | OUTPATIENT
Start: 2019-02-11 | End: 2019-02-11

## 2019-02-11 RX ORDER — DULOXETINE HYDROCHLORIDE 30 MG/1
60 CAPSULE, DELAYED RELEASE ORAL DAILY
Qty: 0 | Refills: 0 | Status: DISCONTINUED | OUTPATIENT
Start: 2019-02-11 | End: 2019-03-26

## 2019-02-11 RX ORDER — FERROUS SULFATE 325(65) MG
325 TABLET ORAL DAILY
Qty: 0 | Refills: 0 | Status: DISCONTINUED | OUTPATIENT
Start: 2019-02-11 | End: 2019-02-21

## 2019-02-11 RX ORDER — INSULIN LISPRO 100/ML
VIAL (ML) SUBCUTANEOUS
Qty: 0 | Refills: 0 | Status: DISCONTINUED | OUTPATIENT
Start: 2019-02-11 | End: 2019-03-05

## 2019-02-11 RX ORDER — MAGNESIUM OXIDE 400 MG ORAL TABLET 241.3 MG
400 TABLET ORAL DAILY
Qty: 0 | Refills: 0 | Status: DISCONTINUED | OUTPATIENT
Start: 2019-02-11 | End: 2019-03-26

## 2019-02-11 RX ORDER — PANTOPRAZOLE SODIUM 20 MG/1
40 TABLET, DELAYED RELEASE ORAL
Qty: 0 | Refills: 0 | Status: DISCONTINUED | OUTPATIENT
Start: 2019-02-11 | End: 2019-03-26

## 2019-02-11 RX ORDER — SODIUM CHLORIDE 9 MG/ML
1000 INJECTION, SOLUTION INTRAVENOUS
Qty: 0 | Refills: 0 | Status: DISCONTINUED | OUTPATIENT
Start: 2019-02-11 | End: 2019-03-26

## 2019-02-11 RX ORDER — ALBUTEROL 90 UG/1
2.5 AEROSOL, METERED ORAL ONCE
Qty: 0 | Refills: 0 | Status: COMPLETED | OUTPATIENT
Start: 2019-02-11 | End: 2019-02-11

## 2019-02-11 RX ORDER — GLUCAGON INJECTION, SOLUTION 0.5 MG/.1ML
1 INJECTION, SOLUTION SUBCUTANEOUS ONCE
Qty: 0 | Refills: 0 | Status: DISCONTINUED | OUTPATIENT
Start: 2019-02-11 | End: 2019-03-26

## 2019-02-11 RX ORDER — FUROSEMIDE 40 MG
20 TABLET ORAL ONCE
Qty: 0 | Refills: 0 | Status: DISCONTINUED | OUTPATIENT
Start: 2019-02-11 | End: 2019-02-11

## 2019-02-11 RX ORDER — DEXTROSE 50 % IN WATER 50 %
12.5 SYRINGE (ML) INTRAVENOUS ONCE
Qty: 0 | Refills: 0 | Status: DISCONTINUED | OUTPATIENT
Start: 2019-02-11 | End: 2019-03-26

## 2019-02-11 RX ORDER — SODIUM CHLORIDE 9 MG/ML
1000 INJECTION, SOLUTION INTRAVENOUS
Qty: 0 | Refills: 0 | Status: DISCONTINUED | OUTPATIENT
Start: 2019-02-11 | End: 2019-02-12

## 2019-02-11 RX ORDER — DEXTROSE 50 % IN WATER 50 %
25 SYRINGE (ML) INTRAVENOUS ONCE
Qty: 0 | Refills: 0 | Status: DISCONTINUED | OUTPATIENT
Start: 2019-02-11 | End: 2019-03-26

## 2019-02-11 RX ORDER — GABAPENTIN 400 MG/1
100 CAPSULE ORAL THREE TIMES A DAY
Qty: 0 | Refills: 0 | Status: DISCONTINUED | OUTPATIENT
Start: 2019-02-11 | End: 2019-03-26

## 2019-02-11 RX ORDER — DOCUSATE SODIUM 100 MG
100 CAPSULE ORAL
Qty: 0 | Refills: 0 | Status: DISCONTINUED | OUTPATIENT
Start: 2019-02-11 | End: 2019-03-26

## 2019-02-11 RX ORDER — CALCIUM GLUCONATE 100 MG/ML
1 VIAL (ML) INTRAVENOUS ONCE
Qty: 0 | Refills: 0 | Status: COMPLETED | OUTPATIENT
Start: 2019-02-11 | End: 2019-02-11

## 2019-02-11 RX ORDER — MORPHINE SULFATE 50 MG/1
2 CAPSULE, EXTENDED RELEASE ORAL ONCE
Qty: 0 | Refills: 0 | Status: DISCONTINUED | OUTPATIENT
Start: 2019-02-11 | End: 2019-02-11

## 2019-02-11 RX ORDER — ISOSORBIDE MONONITRATE 60 MG/1
60 TABLET, EXTENDED RELEASE ORAL DAILY
Qty: 0 | Refills: 0 | Status: DISCONTINUED | OUTPATIENT
Start: 2019-02-11 | End: 2019-03-26

## 2019-02-11 RX ORDER — FUROSEMIDE 40 MG
20 TABLET ORAL ONCE
Qty: 0 | Refills: 0 | Status: COMPLETED | OUTPATIENT
Start: 2019-02-11 | End: 2019-02-11

## 2019-02-11 RX ORDER — INSULIN HUMAN 100 [IU]/ML
10 INJECTION, SOLUTION SUBCUTANEOUS ONCE
Qty: 0 | Refills: 0 | Status: COMPLETED | OUTPATIENT
Start: 2019-02-11 | End: 2019-02-11

## 2019-02-11 RX ORDER — CARVEDILOL PHOSPHATE 80 MG/1
25 CAPSULE, EXTENDED RELEASE ORAL EVERY 12 HOURS
Qty: 0 | Refills: 0 | Status: DISCONTINUED | OUTPATIENT
Start: 2019-02-11 | End: 2019-03-09

## 2019-02-11 RX ORDER — DEXTROSE 50 % IN WATER 50 %
50 SYRINGE (ML) INTRAVENOUS ONCE
Qty: 0 | Refills: 0 | Status: COMPLETED | OUTPATIENT
Start: 2019-02-11 | End: 2019-02-11

## 2019-02-11 RX ORDER — ASPIRIN/CALCIUM CARB/MAGNESIUM 324 MG
81 TABLET ORAL DAILY
Qty: 0 | Refills: 0 | Status: DISCONTINUED | OUTPATIENT
Start: 2019-02-11 | End: 2019-02-20

## 2019-02-11 RX ADMIN — GABAPENTIN 100 MILLIGRAM(S): 400 CAPSULE ORAL at 05:47

## 2019-02-11 RX ADMIN — SODIUM POLYSTYRENE SULFONATE 30 GRAM(S): 4.1 POWDER, FOR SUSPENSION ORAL at 22:26

## 2019-02-11 RX ADMIN — FLUDROCORTISONE ACETATE 0.1 MILLIGRAM(S): 0.1 TABLET ORAL at 22:26

## 2019-02-11 RX ADMIN — Medication 100 MILLIGRAM(S): at 05:47

## 2019-02-11 RX ADMIN — MORPHINE SULFATE 2 MILLIGRAM(S): 50 CAPSULE, EXTENDED RELEASE ORAL at 22:07

## 2019-02-11 RX ADMIN — Medication 100 MILLIGRAM(S): at 16:26

## 2019-02-11 RX ADMIN — Medication 50 MILLILITER(S): at 04:12

## 2019-02-11 RX ADMIN — Medication 200 GRAM(S): at 04:22

## 2019-02-11 RX ADMIN — CARVEDILOL PHOSPHATE 25 MILLIGRAM(S): 80 CAPSULE, EXTENDED RELEASE ORAL at 16:26

## 2019-02-11 RX ADMIN — MORPHINE SULFATE 4 MILLIGRAM(S): 50 CAPSULE, EXTENDED RELEASE ORAL at 01:05

## 2019-02-11 RX ADMIN — MORPHINE SULFATE 2 MILLIGRAM(S): 50 CAPSULE, EXTENDED RELEASE ORAL at 22:30

## 2019-02-11 RX ADMIN — SODIUM POLYSTYRENE SULFONATE 30 GRAM(S): 4.1 POWDER, FOR SUSPENSION ORAL at 11:37

## 2019-02-11 RX ADMIN — Medication 325 MILLIGRAM(S): at 11:37

## 2019-02-11 RX ADMIN — INSULIN HUMAN 10 UNIT(S): 100 INJECTION, SOLUTION SUBCUTANEOUS at 04:12

## 2019-02-11 RX ADMIN — Medication 40 MEQ/KG/HR: at 16:27

## 2019-02-11 RX ADMIN — ALBUTEROL 2.5 MILLIGRAM(S): 90 AEROSOL, METERED ORAL at 22:52

## 2019-02-11 RX ADMIN — DULOXETINE HYDROCHLORIDE 60 MILLIGRAM(S): 30 CAPSULE, DELAYED RELEASE ORAL at 11:37

## 2019-02-11 RX ADMIN — GABAPENTIN 100 MILLIGRAM(S): 400 CAPSULE ORAL at 11:40

## 2019-02-11 RX ADMIN — SODIUM POLYSTYRENE SULFONATE 30 GRAM(S): 4.1 POWDER, FOR SUSPENSION ORAL at 04:10

## 2019-02-11 RX ADMIN — ATORVASTATIN CALCIUM 80 MILLIGRAM(S): 80 TABLET, FILM COATED ORAL at 21:32

## 2019-02-11 RX ADMIN — FLUDROCORTISONE ACETATE 0.1 MILLIGRAM(S): 0.1 TABLET ORAL at 16:25

## 2019-02-11 RX ADMIN — Medication 40 MILLIGRAM(S): at 16:26

## 2019-02-11 RX ADMIN — GABAPENTIN 100 MILLIGRAM(S): 400 CAPSULE ORAL at 21:32

## 2019-02-11 RX ADMIN — MORPHINE SULFATE 4 MILLIGRAM(S): 50 CAPSULE, EXTENDED RELEASE ORAL at 01:55

## 2019-02-11 RX ADMIN — Medication 20 MILLIGRAM(S): at 05:47

## 2019-02-11 RX ADMIN — Medication 81 MILLIGRAM(S): at 11:37

## 2019-02-11 RX ADMIN — MAGNESIUM OXIDE 400 MG ORAL TABLET 400 MILLIGRAM(S): 241.3 TABLET ORAL at 11:37

## 2019-02-11 RX ADMIN — SODIUM CHLORIDE 70 MILLILITER(S): 9 INJECTION, SOLUTION INTRAVENOUS at 22:25

## 2019-02-11 RX ADMIN — ISOSORBIDE MONONITRATE 60 MILLIGRAM(S): 60 TABLET, EXTENDED RELEASE ORAL at 11:37

## 2019-02-11 NOTE — PROGRESS NOTE ADULT - ASSESSMENT
Pt is a 64 yo presenting to ED w/ SOB 2/2 diastolic HF exacerbation and noted to have KEITH and hyperkalemia, PMH CAD s/p CABG and PCI, PAD s/p baloon angioplasty, DM2, HTN, HLD, hx of cardiac arrest, recent GI bleed last month, Diastolic CHF, legally blind, pleural effusion requiring thoracentesis in Dec 2018.     Acute on Chronic Diastolic HF: Exacerbation,   Lasix 40mg iv bid   -[facility] if following   -Previous echo  showed pulm HTN and normal LVEF 60%.     Hyperkalemia: Repeated potassiums elevated, I started her treatment immediately after seeing the patient. No EKG changes, patient asymptomatic.   -will hold Losartan  -consult Nephro -Dr. Dunaway.     KEITH: KEITH on CKD, suspect due to her CHF and likely ARB use.   Holding ARB  -will avoid IVF in setting of volume overload.     HTN:   Coreg 25mg po bid      HLD: continue statin    CAD: s/p CABG and MADHAVI  - Recent stress test mildly positive for RCA disease  - Medically managed  -Losartan on hold and plavix on hold due to recent GIB  -ASA was resumed.   Imdur     PAD: s/p baloon angioplasty. Continue ASA. Hold Plavix due to recent GIB.     Anemia: Hgb at baseline, no signs of active bleeding. Continue to monitor.     Transaminitis: etiology unclear, likely 2/2 to her underlying HF. Will need to monitor and ensure resolution.     Depression: Duloxetine 60mg po daily     Legally blind ;  consult for discharge planning     DVT ppx: SCD's, no pharm dvt ppx due to recent GIB.

## 2019-02-11 NOTE — CONSULT NOTE ADULT - SUBJECTIVE AND OBJECTIVE BOX
Patient is a 63y old  Female who presents with a chief complaint of SOB (2019 09:59)  Found with KEITH,  high K    HPI:  Pt is a 62 yo presenting to ED w/ SOB, PMH CAD s/p CABG and PCI, PAD s/p baloon angioplasty, DM2, HTN, HLD, hx of cardiac arrest, recent GI bleed last month, Diastolic CHF, legally blind, pleural effusion requiring thoracentesis in Dec 2018.   Patient states that she has been having increasing SOB over the last week, she lives at home with her family, denies sick contacts, but has been having more exertional dyspnea. She states she walks with a walker but it is taking less activity for her dyspnea to occur. She has been compliant with all of her medications, she does not recall the names, but states that there have been no changes to her medication regimen since her discharge. She denies any associated chest pain or pressure. She has been taking her tosemide she believes. Denies PND or orthopnea, but she has noted increased LE edema.   Denies headaches, nausea, vomiting, chest pain, palpitations, abdominal pain, constipation, diarrhea, melena, hematochezia, dysuria.   Her SOB has since resolved. She is breathing comfortably, has no complaints.  In ED patient was noted to have hyperkalemia on repeat labs, troponin elevated at 0.09. Her creatinine is also elevated. She did not receive any potassium treatment initially so she was ordered Calcium gluc, kayexelate, Insulin, dextrose, Albuterol. EKG without widened QRS or peaked T waves. (2019 03:58)      PAST MEDICAL & SURGICAL HISTORY:  Herniated disc, cervical  PAD (peripheral artery disease)  Legally blind  History of peripheral vascular disease  History of retinal detachment  History of CEA (carotid endarterectomy): Right  Hyperlipidemia  Glaucoma  Hypertension  Diabetes  S/P CABG x 1  After cataract  Uveitic glaucoma of both eyes  Detached retina  Atherosclerosis of right carotid artery   delivery delivered      FAMILY HISTORY:      Social History:   -smoke   - Alcohol   -    Drugs        REVIEW OF SYSTEMS:  CONSTITUTIONAL: No fever, weight loss, or fatigue  EYES: No eye pain, No visual disturbances,   RESPIRATORY: No cough, hemoptysis; + SOB christianne BOONE  CARDIOVASCULAR: No chest pain, No palpitations,   mild leg swelling  GASTROINTESTINAL: No abdominal , NVD or GIB  GENITOURINARY: No UTI sx/hematuria  NEUROLOGICAL: No HA/wk/numbness  MUSCULOSKELETAL: No joint pain, No swelling      Vital Signs Last 24 Hrs  T(C): 36.5 (2019 11:07), Max: 36.5 (2019 11:07)  T(F): 97.7 (2019 11:07), Max: 97.7 (2019 11:07)  HR: 59 (2019 11:07) (56 - 63)  BP: 116/65 (2019 11:07) (105/60 - 149/65)  BP(mean): --  RR: 18 (2019 11:07) (18 - 26)  SpO2: 99% (2019 11:07) (95% - 100%)    PHYSICAL EXAM:    GENERAL: NAD, Obese Pleasant  EYES:  conjunctiva and sclera clear  NECK: Supple, No JVD/Carotid Bruit -ve   NERVOUS SYSTEM:  A/O x3,   Lungs : No rales, No rhonchi,   HEART:  No murmur,  No rub, No gallops  ABDOMEN: Soft, NT/ND BS+Obese  EXTREMITIES:  + Peripheral Pulses, mild edema  SKIN: No rashes or lesions      LABS:                        9.6    4.0   )-----------( 150      ( 2019 09:25 )             30.0     02-11    139  |  103  |  56.0<H>  ----------------------------<  120<H> K was 7.8  6.6<HH>   |  26.0  |  1.66<H>    Ca    8.5<L>      2019 09:25    TPro  7.6  /  Alb  3.1<L>  /  TBili  0.6  /  DBili  x   /  AST  41<H>  /  ALT  36<H>  /  AlkPhos  225<H>  02-10    PT/INR - ( 10 Feb 2019 22:15 )   PT: 14.8 sec;   INR: 1.28 ratio         PTT - ( 10 Feb 2019 22:15 )  PTT:30.4 sec    BNP 7,500    RADIOLOGY & ADDITIONAL TESTS: CXR -CHF + mild R Pl effuion    MEDICATIONS  (STANDING):  ALBUTerol    0.083%  aspirin  chewable  atorvastatin  carvedilol  dextrose 40% Gel PRN  dextrose 5%.  dextrose 50% Injectable  dextrose 50% Injectable  dextrose 50% Injectable  docusate sodium  DULoxetine  ferrous sulfate Oral Tab/Cap - Peds  furosemide   Injectable  furosemide   Injectable  gabapentin  glucagon  Injectable PRN  insulin lispro (HumaLOG) corrective regimen sliding scale  isosorbide   mononitrate ER Tablet (IMDUR)  magnesium oxide  pantoprazole    Tablet

## 2019-02-11 NOTE — CONSULT NOTE ADULT - SUBJECTIVE AND OBJECTIVE BOX
History obtained by: Patient and medical record     obtained: No    Chief complaint:  "I couldn't breathe"    HPI:  This is 62 y/o female with hx of CAD, s/p CABG x1 (lima->LAD 2015), MADHAVI x2-> LCX & OM1 (2015), Nuclear stress 2018 with mildly positive ischemia in RCA (medically managed), Cardiac arrest due to hypercapnea, HFpEF (2018 EF 65-70%), RV dysfunction and severe pulmonary HTN, PAD s/p balloon angioplasty, DM2, HTN, HLD who presents with dyspnea on exertion. Pt has been feeling SOB for about 2 weeks but yesterday it got worse and she decided to come to the hospital. In the ER she was found to have proBNP 7854, troponin 0.08, KEITH on CKD and K 7.2. Hyperkalemia was treated and pt received Lasix 20 mg IVP x2. Pt denies chest pain or palpitations but c/o increased abdominal girth and LE swelling. She also uses more pillows at night. EKG shows SB 57 bpm with inverted T-waves in inferolateral leads (unchanged from previous). Pt was recently admitted for GI bleed and her Plavix was discontinued. She was prescribed Torsemide at discharge but is not sure if she's been taking it at home. Recent echo shows normal EF, grade II DD and mod-sev MR.      REVIEW OF SYMPTOMS:   Cardiovascular:  as per HPI  Respiratory:  c/o dyspnea, denies cough  Genitourinary:  No dysuria, no hematuria;   Gastrointestinal:   No dark color stool, no melena, no diarrhea, no constipation, no abdominal pain;   Neurological: No headache, no dizziness, no slurred speech;    Psychiatric: No agitation, no anxiety.  ALL OTHER REVIEW OF SYSTEMS ARE NEGATIVE.    MEDICATIONS  (STANDING):  ALBUTerol    0.083% 10 milliGRAM(s) Nebulizer once  aspirin  chewable 81 milliGRAM(s) Oral daily  atorvastatin 80 milliGRAM(s) Oral at bedtime  dextrose 5%. 1000 milliLiter(s) (50 mL/Hr) IV Continuous <Continuous>  dextrose 50% Injectable 12.5 Gram(s) IV Push once  dextrose 50% Injectable 25 Gram(s) IV Push once  dextrose 50% Injectable 25 Gram(s) IV Push once  docusate sodium 100 milliGRAM(s) Oral two times a day  DULoxetine 60 milliGRAM(s) Oral daily  ferrous sulfate Oral Tab/Cap - Peds 325 milliGRAM(s) Oral daily  furosemide   Injectable 20 milliGRAM(s) IV Push once  furosemide   Injectable 20 milliGRAM(s) IV Push once  gabapentin 100 milliGRAM(s) Oral three times a day  insulin lispro (HumaLOG) corrective regimen sliding scale   SubCutaneous three times a day before meals  isosorbide   mononitrate ER Tablet (IMDUR) 60 milliGRAM(s) Oral daily  magnesium oxide 400 milliGRAM(s) Oral daily  pantoprazole    Tablet 40 milliGRAM(s) Oral before breakfast    MEDICATIONS  (PRN):  dextrose 40% Gel 15 Gram(s) Oral once PRN Blood Glucose LESS THAN 70 milliGRAM(s)/deciliter  glucagon  Injectable 1 milliGRAM(s) IntraMuscular once PRN Glucose LESS THAN 70 milligrams/deciliter        PAST MEDICAL & SURGICAL HISTORY:  Herniated disc, cervical  PAD (peripheral artery disease)  Legally blind  History of peripheral vascular disease  History of retinal detachment  History of CEA (carotid endarterectomy): Right  Hyperlipidemia  Glaucoma  Hypertension  Diabetes  S/P CABG x 1  After cataract  Uveitic glaucoma of both eyes  Detached retina  Atherosclerosis of right carotid artery   delivery delivered      FAMILY HISTORY:      SOCIAL HISTORY: lives with  and other family members    CIGARETTES: never smoked    ALCOHOL: denies    DRUGS: denies      Vital Signs Last 24 Hrs  T(C): 36.4 (2019 02:22), Max: 36.4 (2019 02:22)  T(F): 97.6 (2019 02:22), Max: 97.6 (2019 02:22)  HR: 57 (2019 02:22) (57 - 63)  BP: 149/65 (2019 02:22) (123/79 - 149/65)  BP(mean): --  RR: 20 (2019 02:22) (20 - 26)  SpO2: 100% (2019 02:22) (95% - 100%)    PHYSICAL EXAM:  General: WN/WD NAD  Neurology: A&Ox3, nonfocal, GAN x 4  Eyes: PERRL/ EOMI left eye, Gross vision intact  ENT/Neck: Neck supple, trachea midline, No JVD, Gross hearing intact  Respiratory: b/l scattered wheezes  CV: RRR, S1S2, 2/6 systolic murmur  Abdominal: Soft, NT, ND +BS,   Extremities: mild edema, + peripheral pulses  Skin: No Rashes, Hematoma, Ecchymosis      INTERPRETATION OF TELEMETRY:    ECG: SR 57 bpm, inverted T-waves in inferolateral leads      I&O's Detail      LABS:                        10.0   5.1   )-----------( 151      ( 10 Feb 2019 22:15 )             30.8     02-10    136  |  103  |  55.0<H>  ----------------------------<  126<H>  7.9<HH>   |  24.0  |  1.55<H>    Ca    8.3<L>      10 Feb 2019 23:09    TPro  7.6  /  Alb  3.1<L>  /  TBili  0.6  /  DBili  x   /  AST  41<H>  /  ALT  36<H>  /  AlkPhos  225<H>  0210    CARDIAC MARKERS ( 10 Feb 2019 23:09 )  x     / 0.08 ng/mL / x     / x     / x      CARDIAC MARKERS ( 10 Feb 2019 22:14 )  x     / 0.09 ng/mL / 139 U/L / x     / x          PT/INR - ( 10 Feb 2019 22:15 )   PT: 14.8 sec;   INR: 1.28 ratio         PTT - ( 10 Feb 2019 22:15 )  PTT:30.4 sec    I&O's Summary      RADIOLOGY & ADDITIONAL STUDIES:  X-ray:    PREVIOUS DIAGNOSTIC TESTING:      ECHO  < from: TTE Echo Complete w/Doppler (19 @ 13:39) >    Summary:   1. Technically difficult study.   2. Left ventricular ejection fraction, by visual estimation, is 55 to   60%.   3. Normal global left ventricular systolic function.   4. Spectral Doppler shows pseudonormal pattern of left ventricular   myocardial filling (Grade II diastolic dysfunction). Elevated mean left   atrial pressure.   5. Normal left ventricular internal cavity size.   6. Limited views of the RV which appears moderately enlarged with   moderately reduced function.   7. Thickening of the anterior and posterior mitral valve leaflets.   8. Mild Mitral valve prolapse.   9. Moderate to severe mitral valve regurgitation.  10. Normal trileaflet aortic valve with normal opening.  11. Moderate-severe tricuspid regurgitation.  12. Estimated pulmonary artery systolic pressure is 42.0 mmHg assuming a   right atrial pressure of 15 mmHg, which is consistent with mild pulmonary   hypertension.  13. There is no evidence of pericardial effusion.    [id number] Pravin Moscoso MD, Electronically signed on 2019 at 3:19:24   PM    < end of copied text >      STRESS    < from: Nuclear Stress Test-Pharmacologic (18 @ 08:24) >  IMPRESSIONS:  Positive study.    Single vessel, inferior ischemia involving the RCA  territory is present.  Intermediate risk scan.  Normal wall motion, LVEF 67%    < end of copied text >      CATHETERIZATION  < from: Cardiac Cath Lab - Adult (18 @ 15:00) >  PROCEDURE:  --  Right heart catheterization.  --  Sonosite.  TECHNIQUE: The risks and alternatives of the procedures and conscious  sedation were explained to the patient and informed consent was obtained.  Local anestheticgiven. Right internal jugular vein access. Right heart  catheterization. The procedure was performed utilizing a catheter.  Sonosite. RADIATION EXPOSURE: 1.6 min.  MEDICATIONS GIVEN: Dobutamine, infusion rate of 3 mcg/kg/min, IV. 1%  Lidocaine, 10 ml, subcutaneously.  HEMODYNAMICS: There is severe left sided failure. There is severe pulmonary  hypertension.  COMPLICATIONS: No complications occurred during the cath lab visit.  DIAGNOSTIC IMPRESSIONS: Severe pulmonary hypertension.  Severely elevated wedge pressure.  Poor cardiac output with low mixed venous saturation.  DIAGNOSTIC RECOMMENDATIONS: Dobutamine to support RV.  Diuresis.  Consider MOLLY after CTA imaging as possible cause of renal dysfunction.  Prepared and signed by  Los Recio MD  Signed 2018 14:34:57    < end of copied text >

## 2019-02-11 NOTE — CHART NOTE - NSCHARTNOTEFT_GEN_A_CORE
Medicine PA- Cd. @ 2125 for pt. c/o CP "pressure". Pt. admitted w/ SOB, +CHF,  hx CAD, CABGx1. CP is dull ache, non-radiating, has had similar pain in past, no palpitations or N/V. On admission pt. found to have hyperkalemia, last K+-6.8 @ 1713, received kayexalate, also had +troponins x3, last 0.10 @ 0925. Seen by cardiology, MBonifacioD. aware of hyperkalemia and +troponins, which are likely secondary to KEITH. On exam VSS- 128/75-60-% 3L- 97.5 Pt. appears comfortable, in NAD. S1S2 ausc. Lungs- clear bilat. w/ distant BS.  Chest- non-tender Abd- soft, non-tender Ext- 1+ edema  DX-R/O MI,  Stat EKG- NSR @60 w/non-spec. T-waves, similar to EKG earlier 2/11.  Reassurance given, MS 2mg IVP x1, repeat BMP, EKG in a.m. Call PA if status changes.

## 2019-02-11 NOTE — H&P ADULT - HISTORY OF PRESENT ILLNESS
Pt is a 64 yo presenting to ED w/ SOB, PMH CAD s/p CABG and PCI, PAD s/p baloon angioplasty, DM2, HTN, HLD, hx of cardiac arrest, recent GI bleed last month, Diastolic CHF, legally blind, pleural effusion requiring thoracentesis in Dec 2018.   Patient states that she has been having increasing SOB over the last week, she lives at home with her family, denies sick contacts, but has been having more exertional dyspnea. She states she walks with a walker but it is taking less activity for her dyspnea to occur. She has been compliant with all of her medications, she does not recall the names, but states that there have been no changes to her medication regimen since her discharge. She denies any associated chest pain or pressure. She has been taking her tosemide she believes. Denies PND or orthopnea, but she has noted increased LE edema.   Denies headaches, nausea, vomiting, chest pain, palpitations, abdominal pain, constipation, diarrhea, melena, hematochezia, dysuria.   In ED patient was noted to have hyperkalemia on repeat labs, troponin elevated at 0.09. Her creatinine is also elevated. She did not receive any potassium treatment initially so I ordered Calcium gluc, kayexelate, Insulin, dextrose, Albuterol. EKG without widened QRS or peaked T waves. Pt is a 62 yo presenting to ED w/ SOB, PMH CAD s/p CABG and PCI, PAD s/p baloon angioplasty, DM2, HTN, HLD, hx of cardiac arrest, recent GI bleed last month, Diastolic CHF, legally blind, pleural effusion requiring thoracentesis in Dec 2018.   Patient states that she has been having increasing SOB over the last week, she lives at home with her family, denies sick contacts, but has been having more exertional dyspnea. She states she walks with a walker but it is taking less activity for her dyspnea to occur. She has been compliant with all of her medications, she does not recall the names, but states that there have been no changes to her medication regimen since her discharge. She denies any associated chest pain or pressure. She has been taking her tosemide she believes. Denies PND or orthopnea, but she has noted increased LE edema.   Denies headaches, nausea, vomiting, chest pain, palpitations, abdominal pain, constipation, diarrhea, melena, hematochezia, dysuria.   Her SOB has since resolved. She is breathing comfortably, has no complaints.  In ED patient was noted to have hyperkalemia on repeat labs, troponin elevated at 0.09. Her creatinine is also elevated. She did not receive any potassium treatment initially so I ordered Calcium gluc, kayexelate, Insulin, dextrose, Albuterol. EKG without widened QRS or peaked T waves.

## 2019-02-11 NOTE — CONSULT NOTE ADULT - ASSESSMENT
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

## 2019-02-11 NOTE — H&P ADULT - ASSESSMENT
Pt is a 62 yo presenting to ED w/ SOB 2/2 diastolic HF exacerbation and noted to have KEITH and hyperkalemia, PMH CAD s/p CABG and PCI, PAD s/p baloon angioplasty, DM2, HTN, HLD, hx of cardiac arrest, recent GI bleed last month, Diastolic CHF, legally blind, pleural effusion requiring thoracentesis in Dec 2018.     Acute on Chronic Diastolic HF: Exacerbation, will give IV lasix 20mg x1, gentle diuresis in setting of her KEITH and monitor renal fxn.   -consult cardiology, [facility].   -Previous echo reviewed and showed pulm HTN and normal LVEF.     Hyperkalemia: Repeated potassiums elevated, I started her treatment immediately after seeing the patient. No EKG changes, patient asymptomatic.   -will hold Losartan  -see treatment regimen above.   -repeat K 1 hour after meds given.   -monitor on telemetry  -consult Nephro -Dr. Dunaway.     KEITH: KEITH on CKD, suspect due to her CHF and likely ARB use.   -see plan above.   -will avoid IVF in setting of volume overload.     HTN: BP stable and at goal.     HLD: continue statin    CAD: s/p CABG and MADHAVI  - Recent stress test mildly positive for RCA disease  - Medically managed  -Losartan on hold and plavix on hold due to recent GIB  -ASA was resumed. Can discuss with cardio and GI when to restart plavix.   -elevated troponin likely in setting of her KEITH, no EKG changes, will repeat troponin in AM.   -cardiology consult called in.     PAD: s/p baloon angioplasty. Continue ASA. Hold Plavix due to recent GIB.     Anemia: Hgb at baseline, no signs of active bleeding. Continue to monitor.     Transaminitis: etiology unclear, likely 2/2 to her underlying HF. Will need to monitor and ensure resolution.     DVT ppx: SCD's, no pharm dvt ppx due to recent GIB.     Full code. Code status discussed with patient at bedside. Pt is a 62 yo presenting to ED w/ SOB 2/2 diastolic HF exacerbation and noted to have KEITH and hyperkalemia, PMH CAD s/p CABG and PCI, PAD s/p baloon angioplasty, DM2, HTN, HLD, hx of cardiac arrest, recent GI bleed last month, Diastolic CHF, legally blind, pleural effusion requiring thoracentesis in Dec 2018.     Acute on Chronic Diastolic HF: Exacerbation, will give IV lasix 20mg x1, gentle diuresis in setting of her KEITH and monitor renal fxn.   -consult cardiology, [facility].   -Previous echo reviewed and showed pulm HTN and normal LVEF.     Hyperkalemia: Repeated potassiums elevated, I started her treatment immediately after seeing the patient. No EKG changes, patient asymptomatic.   -will hold Losartan  -see treatment regimen above.   -repeat K 1 hour after meds given.   -monitor on telemetry  -consult Nephro -Dr. Dunaway.   -if no improvement of K on repeat labs or if she becomes symptomatic or develops EKG/telemetry changes will call nephrology, if no improvement she may need dialysis and i informed the patient this could be a possibility if no improvement    KEITH: KEITH on CKD, suspect due to her CHF and likely ARB use.   -see plan above.   -will avoid IVF in setting of volume overload.     HTN: BP stable and at goal.     HLD: continue statin    CAD: s/p CABG and MADHAVI  - Recent stress test mildly positive for RCA disease  - Medically managed  -Losartan on hold and plavix on hold due to recent GIB  -ASA was resumed. Can discuss with cardio and GI when to restart plavix.   -elevated troponin likely in setting of her KEITH, no EKG changes, will repeat troponin in AM.   -cardiology consult called in.     PAD: s/p baloon angioplasty. Continue ASA. Hold Plavix due to recent GIB.     Anemia: Hgb at baseline, no signs of active bleeding. Continue to monitor.     Transaminitis: etiology unclear, likely 2/2 to her underlying HF. Will need to monitor and ensure resolution.     DVT ppx: SCD's, no pharm dvt ppx due to recent GIB.     Full code. Code status discussed with patient at bedside.

## 2019-02-11 NOTE — H&P ADULT - NSHPPHYSICALEXAM_GEN_ALL_CORE
T(C): 36.4 (02-11-19 @ 02:22), Max: 36.4 (02-11-19 @ 02:22)  HR: 57 (02-11-19 @ 02:22) (57 - 63)  BP: 149/65 (02-11-19 @ 02:22) (123/79 - 149/65)  RR: 20 (02-11-19 @ 02:22) (20 - 26)  SpO2: 100% (02-11-19 @ 02:22) (95% - 100%)  Wt(kg): --    Physical Exam:   GENERAL: morbidly obese, no distress  HEENT: head NC/AT; R eye discolored, hearing grossly intact, moist mucous membranes  NECK: supple, no JVD  RESPIRATORY: CTA B/L, no wheezing, rales, rhonchi or rubs  CARDIOVASCULAR: S1&S2, RRR, no murmurs or gallops  ABDOMEN: soft, non-tender, non-distended, + Bowel sounds x4 quadrants, no guarding, rebound or rigidity  MUSCULOSKELETAL: b/l LE pitting edema 1-2+  LYMPH: no cervical lymphadenopathy  VASCULAR: Radial pulses 2+ bilaterally, no varicose veins   SKIN: warm and dry, color normal  NEUROLOGIC: AA&O X3, CN2-12 intact w/ no focal deficits, no sensory loss, motor Strength 5/5 in UE & LE B/L  Psych: Normal mood and affect, normal behavior

## 2019-02-11 NOTE — PROGRESS NOTE ADULT - SUBJECTIVE AND OBJECTIVE BOX
CC: SOB resolved.  CHF exac . CAD .   HPI:  Pt is a 62 yo presenting to ED w/ SOB, PMH CAD s/p CABG and PCI, PAD s/p baloon angioplasty, DM2, HTN, HLD, hx of cardiac arrest, recent GI bleed last month, Diastolic CHF, legally blind, pleural effusion requiring thoracentesis in Dec 2018.   Patient states that she has been having increasing SOB over the last week, she lives at home with her family, denies sick contacts, but has been having more exertional dyspnea. She states she walks with a walker but it is taking less activity for her dyspnea to occur. She has been compliant with all of her medications, she does not recall the names, but states that there have been no changes to her medication regimen since her discharge. She denies any associated chest pain or pressure. She has been taking her tosemide she believes. Denies PND or orthopnea, but she has noted increased LE edema.   Denies headaches, nausea, vomiting, chest pain, palpitations, abdominal pain, constipation, diarrhea, melena, hematochezia, dysuria.   Her SOB has since resolved. She is breathing comfortably, has no complaints.  In ED patient was noted to have hyperkalemia on repeat labs, troponin elevated at 0.09. Her creatinine is also elevated. She did not receive any potassium treatment initially so I ordered Calcium gluc, kayexelate, Insulin, dextrose, Albuterol. EKG without widened QRS or peaked T waves. (11 Feb 2019 03:58)    REVIEW OF SYSTEMS:    Patient denied fever, chills, abdominal pain, nausea, vomiting, cough, shortness of breath, chest pain or palpitations    Vital Signs Last 24 Hrs  T(C): 36.4 (11 Feb 2019 07:16), Max: 36.4 (11 Feb 2019 02:22)  T(F): 97.6 (11 Feb 2019 07:16), Max: 97.6 (11 Feb 2019 02:22)  HR: 56 (11 Feb 2019 07:16) (56 - 63)  BP: 105/60 (11 Feb 2019 07:16) (105/60 - 149/65)  BP(mean): --  RR: 18 (11 Feb 2019 07:16) (18 - 26)  SpO2: 99% (11 Feb 2019 07:16) (95% - 100%)I&O's Summary    PHYSICAL EXAM:  GENERAL: Obese   HEENT: PERRL, +EOMI, anicteric, no [place]  NECK: Supple, No JVD   CHEST/LUNG: Bilateral rales   HEART: S1S2 Normal intensity, no murmurs, gallops or rubs noted  ABDOMEN: Soft, BS Normoactive, NT, ND, no HSM noted  EXTREMITIES:  2+ radial and DP pulses noted, no clubbing, cyanosis, or edema noted, Limited mobility   SKIN: No rashes or lesions noted  NEURO: A&O, no focal deficits noted, CN II-XII intact  PSYCH: Depressed  mood and affect; insight/judgement appropriate  LABS:                        10.0   5.1   )-----------( 151      ( 10 Feb 2019 22:15 )             30.8     02-10    136  |  103  |  55.0<H>  ----------------------------<  126<H>  7.9<HH>   |  24.0  |  1.55<H>    Ca    8.3<L>      10 Feb 2019 23:09    TPro  7.6  /  Alb  3.1<L>  /  TBili  0.6  /  DBili  x   /  AST  41<H>  /  ALT  36<H>  /  AlkPhos  225<H>  02-10    PT/INR - ( 10 Feb 2019 22:15 )   PT: 14.8 sec;   INR: 1.28 ratio         PTT - ( 10 Feb 2019 22:15 )  PTT:30.4 sec    RADIOLOGY & ADDITIONAL TESTS:    MEDICATIONS:  MEDICATIONS  (STANDING):  ALBUTerol    0.083% 10 milliGRAM(s) Nebulizer once  aspirin  chewable 81 milliGRAM(s) Oral daily  atorvastatin 80 milliGRAM(s) Oral at bedtime  carvedilol 25 milliGRAM(s) Oral every 12 hours  dextrose 5%. 1000 milliLiter(s) (50 mL/Hr) IV Continuous <Continuous>  dextrose 50% Injectable 12.5 Gram(s) IV Push once  dextrose 50% Injectable 25 Gram(s) IV Push once  dextrose 50% Injectable 25 Gram(s) IV Push once  docusate sodium 100 milliGRAM(s) Oral two times a day  DULoxetine 60 milliGRAM(s) Oral daily  ferrous sulfate Oral Tab/Cap - Peds 325 milliGRAM(s) Oral daily  furosemide   Injectable 20 milliGRAM(s) IV Push once  furosemide   Injectable 40 milliGRAM(s) IV Push every 12 hours  gabapentin 100 milliGRAM(s) Oral three times a day  insulin lispro (HumaLOG) corrective regimen sliding scale   SubCutaneous three times a day before meals  isosorbide   mononitrate ER Tablet (IMDUR) 60 milliGRAM(s) Oral daily  magnesium oxide 400 milliGRAM(s) Oral daily  pantoprazole    Tablet 40 milliGRAM(s) Oral before breakfast    MEDICATIONS  (PRN):  dextrose 40% Gel 15 Gram(s) Oral once PRN Blood Glucose LESS THAN 70 milliGRAM(s)/deciliter  glucagon  Injectable 1 milliGRAM(s) IntraMuscular once PRN Glucose LESS THAN 70 milligrams/deciliter

## 2019-02-11 NOTE — CONSULT NOTE ADULT - PROBLEM SELECTOR RECOMMENDATION 9
-Lasix 40 mg IV bid  -daily weights  -strict I&Os Unclear.   BNP elevated but not significantly.   Will hold diuretics at this time.

## 2019-02-11 NOTE — CONSULT NOTE ADULT - ASSESSMENT
64 y/o female with extensive cardiac hx presents with SOB, now resolved. Pt found to have proBNP 7854, trop 0.08 and hyperkalemia.

## 2019-02-12 DIAGNOSIS — I50.813 ACUTE ON CHRONIC RIGHT HEART FAILURE: ICD-10-CM

## 2019-02-12 LAB
ALBUMIN SERPL ELPH-MCNC: 2.7 G/DL — LOW (ref 3.3–5.2)
ALP SERPL-CCNC: 188 U/L — HIGH (ref 40–120)
ALT FLD-CCNC: 28 U/L — SIGNIFICANT CHANGE UP
ANION GAP SERPL CALC-SCNC: 10 MMOL/L — SIGNIFICANT CHANGE UP (ref 5–17)
ANION GAP SERPL CALC-SCNC: 9 MMOL/L — SIGNIFICANT CHANGE UP (ref 5–17)
AST SERPL-CCNC: 29 U/L — SIGNIFICANT CHANGE UP
BILIRUB SERPL-MCNC: 0.6 MG/DL — SIGNIFICANT CHANGE UP (ref 0.4–2)
BUN SERPL-MCNC: 55 MG/DL — HIGH (ref 8–20)
BUN SERPL-MCNC: 59 MG/DL — HIGH (ref 8–20)
CALCIUM SERPL-MCNC: 7.8 MG/DL — LOW (ref 8.6–10.2)
CALCIUM SERPL-MCNC: 7.8 MG/DL — LOW (ref 8.6–10.2)
CHLORIDE SERPL-SCNC: 103 MMOL/L — SIGNIFICANT CHANGE UP (ref 98–107)
CHLORIDE SERPL-SCNC: 99 MMOL/L — SIGNIFICANT CHANGE UP (ref 98–107)
CO2 SERPL-SCNC: 26 MMOL/L — SIGNIFICANT CHANGE UP (ref 22–29)
CO2 SERPL-SCNC: 26 MMOL/L — SIGNIFICANT CHANGE UP (ref 22–29)
CREAT SERPL-MCNC: 1.4 MG/DL — HIGH (ref 0.5–1.3)
CREAT SERPL-MCNC: 1.45 MG/DL — HIGH (ref 0.5–1.3)
FERRITIN SERPL-MCNC: 279 NG/ML — HIGH (ref 15–150)
GLUCOSE BLDC GLUCOMTR-MCNC: 155 MG/DL — HIGH (ref 70–99)
GLUCOSE BLDC GLUCOMTR-MCNC: 173 MG/DL — HIGH (ref 70–99)
GLUCOSE BLDC GLUCOMTR-MCNC: 203 MG/DL — HIGH (ref 70–99)
GLUCOSE BLDC GLUCOMTR-MCNC: 206 MG/DL — HIGH (ref 70–99)
GLUCOSE SERPL-MCNC: 149 MG/DL — HIGH (ref 70–115)
GLUCOSE SERPL-MCNC: 159 MG/DL — HIGH (ref 70–115)
HBA1C BLD-MCNC: 5.6 % — SIGNIFICANT CHANGE UP (ref 4–5.6)
HCT VFR BLD CALC: 28.3 % — LOW (ref 37–47)
HGB BLD-MCNC: 9.2 G/DL — LOW (ref 12–16)
IRON SATN MFR SERPL: 12 % — LOW (ref 14–50)
IRON SATN MFR SERPL: 38 UG/DL — SIGNIFICANT CHANGE UP (ref 37–145)
MCHC RBC-ENTMCNC: 29.7 PG — SIGNIFICANT CHANGE UP (ref 27–31)
MCHC RBC-ENTMCNC: 32.5 G/DL — SIGNIFICANT CHANGE UP (ref 32–36)
MCV RBC AUTO: 91.3 FL — SIGNIFICANT CHANGE UP (ref 81–99)
PLATELET # BLD AUTO: 133 K/UL — LOW (ref 150–400)
POTASSIUM SERPL-MCNC: 4.5 MMOL/L — SIGNIFICANT CHANGE UP (ref 3.5–5.3)
POTASSIUM SERPL-MCNC: 5.5 MMOL/L — HIGH (ref 3.5–5.3)
POTASSIUM SERPL-SCNC: 4.5 MMOL/L — SIGNIFICANT CHANGE UP (ref 3.5–5.3)
POTASSIUM SERPL-SCNC: 5.5 MMOL/L — HIGH (ref 3.5–5.3)
PROT SERPL-MCNC: 6.9 G/DL — SIGNIFICANT CHANGE UP (ref 6.6–8.7)
RBC # BLD: 3.1 M/UL — LOW (ref 4.4–5.2)
RBC # FLD: 17.1 % — HIGH (ref 11–15.6)
SODIUM SERPL-SCNC: 135 MMOL/L — SIGNIFICANT CHANGE UP (ref 135–145)
SODIUM SERPL-SCNC: 138 MMOL/L — SIGNIFICANT CHANGE UP (ref 135–145)
TIBC SERPL-MCNC: 319 UG/DL — SIGNIFICANT CHANGE UP (ref 220–430)
TRANSFERRIN SERPL-MCNC: 223 MG/DL — SIGNIFICANT CHANGE UP (ref 192–382)
WBC # BLD: 4.1 K/UL — LOW (ref 4.8–10.8)
WBC # FLD AUTO: 4.1 K/UL — LOW (ref 4.8–10.8)

## 2019-02-12 PROCEDURE — 99222 1ST HOSP IP/OBS MODERATE 55: CPT

## 2019-02-12 PROCEDURE — 99233 SBSQ HOSP IP/OBS HIGH 50: CPT

## 2019-02-12 PROCEDURE — 93010 ELECTROCARDIOGRAM REPORT: CPT

## 2019-02-12 RX ORDER — SODIUM POLYSTYRENE SULFONATE 4.1 MEQ/G
30 POWDER, FOR SUSPENSION ORAL ONCE
Qty: 0 | Refills: 0 | Status: COMPLETED | OUTPATIENT
Start: 2019-02-12 | End: 2019-02-12

## 2019-02-12 RX ORDER — IRON SUCROSE 20 MG/ML
200 INJECTION, SOLUTION INTRAVENOUS EVERY 24 HOURS
Qty: 0 | Refills: 0 | Status: COMPLETED | OUTPATIENT
Start: 2019-02-12 | End: 2019-02-15

## 2019-02-12 RX ORDER — INSULIN HUMAN 100 [IU]/ML
1 INJECTION, SOLUTION SUBCUTANEOUS
Qty: 100 | Refills: 0 | Status: DISCONTINUED | OUTPATIENT
Start: 2019-02-12 | End: 2019-02-12

## 2019-02-12 RX ORDER — CALCIUM GLUCONATE 100 MG/ML
2 VIAL (ML) INTRAVENOUS ONCE
Qty: 0 | Refills: 0 | Status: DISCONTINUED | OUTPATIENT
Start: 2019-02-12 | End: 2019-02-12

## 2019-02-12 RX ORDER — DEXTROSE 50 % IN WATER 50 %
50 SYRINGE (ML) INTRAVENOUS ONCE
Qty: 0 | Refills: 0 | Status: DISCONTINUED | OUTPATIENT
Start: 2019-02-12 | End: 2019-02-12

## 2019-02-12 RX ORDER — ACETAMINOPHEN 500 MG
650 TABLET ORAL EVERY 6 HOURS
Qty: 0 | Refills: 0 | Status: DISCONTINUED | OUTPATIENT
Start: 2019-02-12 | End: 2019-03-26

## 2019-02-12 RX ORDER — SODIUM POLYSTYRENE SULFONATE 4.1 MEQ/G
30 POWDER, FOR SUSPENSION ORAL ONCE
Qty: 0 | Refills: 0 | Status: DISCONTINUED | OUTPATIENT
Start: 2019-02-12 | End: 2019-02-12

## 2019-02-12 RX ORDER — SODIUM CHLORIDE 9 MG/ML
500 INJECTION, SOLUTION INTRAVENOUS
Qty: 0 | Refills: 0 | Status: DISCONTINUED | OUTPATIENT
Start: 2019-02-12 | End: 2019-02-12

## 2019-02-12 RX ORDER — INSULIN LISPRO 100/ML
10 VIAL (ML) SUBCUTANEOUS ONCE
Qty: 0 | Refills: 0 | Status: DISCONTINUED | OUTPATIENT
Start: 2019-02-12 | End: 2019-02-12

## 2019-02-12 RX ORDER — INSULIN HUMAN 100 [IU]/ML
10 INJECTION, SOLUTION SUBCUTANEOUS ONCE
Qty: 0 | Refills: 0 | Status: DISCONTINUED | OUTPATIENT
Start: 2019-02-12 | End: 2019-02-12

## 2019-02-12 RX ORDER — SODIUM CHLORIDE 9 MG/ML
1000 INJECTION, SOLUTION INTRAVENOUS
Qty: 0 | Refills: 0 | Status: DISCONTINUED | OUTPATIENT
Start: 2019-02-12 | End: 2019-02-12

## 2019-02-12 RX ADMIN — CARVEDILOL PHOSPHATE 25 MILLIGRAM(S): 80 CAPSULE, EXTENDED RELEASE ORAL at 17:11

## 2019-02-12 RX ADMIN — MAGNESIUM OXIDE 400 MG ORAL TABLET 400 MILLIGRAM(S): 241.3 TABLET ORAL at 12:13

## 2019-02-12 RX ADMIN — Medication 100 MILLIGRAM(S): at 17:11

## 2019-02-12 RX ADMIN — Medication 1: at 12:13

## 2019-02-12 RX ADMIN — PANTOPRAZOLE SODIUM 40 MILLIGRAM(S): 20 TABLET, DELAYED RELEASE ORAL at 05:38

## 2019-02-12 RX ADMIN — ISOSORBIDE MONONITRATE 60 MILLIGRAM(S): 60 TABLET, EXTENDED RELEASE ORAL at 12:26

## 2019-02-12 RX ADMIN — DULOXETINE HYDROCHLORIDE 60 MILLIGRAM(S): 30 CAPSULE, DELAYED RELEASE ORAL at 12:26

## 2019-02-12 RX ADMIN — Medication 2: at 17:50

## 2019-02-12 RX ADMIN — CARVEDILOL PHOSPHATE 25 MILLIGRAM(S): 80 CAPSULE, EXTENDED RELEASE ORAL at 05:38

## 2019-02-12 RX ADMIN — ATORVASTATIN CALCIUM 80 MILLIGRAM(S): 80 TABLET, FILM COATED ORAL at 21:33

## 2019-02-12 RX ADMIN — SODIUM POLYSTYRENE SULFONATE 30 GRAM(S): 4.1 POWDER, FOR SUSPENSION ORAL at 12:13

## 2019-02-12 RX ADMIN — GABAPENTIN 100 MILLIGRAM(S): 400 CAPSULE ORAL at 05:38

## 2019-02-12 RX ADMIN — GABAPENTIN 100 MILLIGRAM(S): 400 CAPSULE ORAL at 21:33

## 2019-02-12 RX ADMIN — Medication 40 MILLIGRAM(S): at 05:38

## 2019-02-12 RX ADMIN — IRON SUCROSE 110 MILLIGRAM(S): 20 INJECTION, SOLUTION INTRAVENOUS at 22:46

## 2019-02-12 RX ADMIN — GABAPENTIN 100 MILLIGRAM(S): 400 CAPSULE ORAL at 17:03

## 2019-02-12 RX ADMIN — Medication 100 MILLIGRAM(S): at 05:38

## 2019-02-12 RX ADMIN — Medication 325 MILLIGRAM(S): at 12:13

## 2019-02-12 RX ADMIN — Medication 81 MILLIGRAM(S): at 12:13

## 2019-02-12 RX ADMIN — Medication 40 MILLIGRAM(S): at 17:12

## 2019-02-12 NOTE — PROGRESS NOTE ADULT - SUBJECTIVE AND OBJECTIVE BOX
[facility]                                                        Office: [address] [address]                                                       Telephone: [phone]. Fax: [phone]      CC: Shortness of breath    INTERVAL HISTORY: Improved shortness of breath.     MEDICATIONS  (STANDING):  ALBUTerol    0.083% 10 milliGRAM(s) Nebulizer once  aspirin  chewable 81 milliGRAM(s) Oral daily  atorvastatin 80 milliGRAM(s) Oral at bedtime  carvedilol 25 milliGRAM(s) Oral every 12 hours  dextrose 5%. 1000 milliLiter(s) (50 mL/Hr) IV Continuous <Continuous>  dextrose 50% Injectable 12.5 Gram(s) IV Push once  dextrose 50% Injectable 25 Gram(s) IV Push once  dextrose 50% Injectable 25 Gram(s) IV Push once  docusate sodium 100 milliGRAM(s) Oral two times a day  DULoxetine 60 milliGRAM(s) Oral daily  ferrous sulfate Oral Tab/Cap - Peds 325 milliGRAM(s) Oral daily  furosemide   Injectable 40 milliGRAM(s) IV Push every 12 hours  gabapentin 100 milliGRAM(s) Oral three times a day  insulin lispro (HumaLOG) corrective regimen sliding scale   SubCutaneous three times a day before meals  isosorbide   mononitrate ER Tablet (IMDUR) 60 milliGRAM(s) Oral daily  magnesium oxide 400 milliGRAM(s) Oral daily  pantoprazole    Tablet 40 milliGRAM(s) Oral before breakfast    ROS: All others negative     PHYSICAL EXAM:  T(C): 36.3 (02-12-19 @ 16:34), Max: 36.6 (02-12-19 @ 10:18)  HR: 63 (02-12-19 @ 16:34) (59 - 72)  BP: 136/69 (02-12-19 @ 16:34) (102/57 - 136/69)  RR: 20 (02-12-19 @ 16:34) (18 - 20)  SpO2: 94% (02-12-19 @ 16:34) (94% - 100%)  Wt(kg): --  I&O's Summary    11 Feb 2019 07:01  -  12 Feb 2019 07:00  --------------------------------------------------------  IN: 420 mL / OUT: 0 mL / NET: 420 mL    12 Feb 2019 07:01  -  12 Feb 2019 18:24  --------------------------------------------------------  IN: 0 mL / OUT: 200 mL / NET: -200 mL      Appearance: Normal	  HEENT:   blind right eye.   Lymphatic: No lymphadenopathy  Cardiovascular: Normal S1 S2, No JVD, No murmurs, + edema.   Respiratory: Lungs clear to auscultation	  Psychiatry: A & O x 3, Mood & affect appropriate  Gastrointestinal:  obese abdomen  Skin: No rashes, No ecchymoses, No cyanosis  Neurologic: Non-focal  Extremities: limited.   Vascular: limited ROM.     TELEMETRY: 	      LABS:	 	                        9.2    4.1   )-----------( 133      ( 12 Feb 2019 07:37 )             28.3     02-12    135  |  99  |  55.0<H>  ----------------------------<  159<H>  4.5   |  26.0  |  1.40<H>    Ca    7.8<L>      12 Feb 2019 17:07    TPro  6.9  /  Alb  2.7<L>  /  TBili  0.6  /  DBili  x   /  AST  29  /  ALT  28  /  AlkPhos  188<H>  02-12

## 2019-02-12 NOTE — PROGRESS NOTE ADULT - SUBJECTIVE AND OBJECTIVE BOX
CC: CHF , acute shortness of breath is resolved.  Chest pain overnight.  Resolved. Conjunctival edema bre .  Legally blind. Hyperkalemia.   HPI:  Pt is a 64 yo presenting to ED w/ SOB, PMH CAD s/p CABG and PCI, PAD s/p baloon angioplasty, DM2, HTN, HLD, hx of cardiac arrest, recent GI bleed last month, Diastolic CHF, legally blind, pleural effusion requiring thoracentesis in Dec 2018.   Patient states that she has been having increasing SOB over the last week, she lives at home with her family, denies sick contacts, but has been having more exertional dyspnea. She states she walks with a walker but it is taking less activity for her dyspnea to occur. She has been compliant with all of her medications, she does not recall the names, but states that there have been no changes to her medication regimen since her discharge. She denies any associated chest pain or pressure. She has been taking her tosemide she believes. Denies PND or orthopnea, but she has noted increased LE edema.   Denies headaches, nausea, vomiting, chest pain, palpitations, abdominal pain, constipation, diarrhea, melena, hematochezia, dysuria.   Her SOB has since resolved. She is breathing comfortably, has no complaints.  In ED patient was noted to have hyperkalemia on repeat labs, troponin elevated at 0.09. Her creatinine is also elevated. She did not receive any potassium treatment initially so I ordered Calcium gluc, kayexelate, Insulin, dextrose, Albuterol. EKG without widened QRS or peaked T waves. (11 Feb 2019 03:58)    REVIEW OF SYSTEMS:    Patient denied fever, chills, abdominal pain, nausea, vomiting, cough, shortness of breath, chest pain or palpitations    Vital Signs Last 24 Hrs  T(C): 36.6 (12 Feb 2019 10:18), Max: 36.6 (12 Feb 2019 10:18)  T(F): 97.9 (12 Feb 2019 10:18), Max: 97.9 (12 Feb 2019 10:18)  HR: 60 (12 Feb 2019 10:18) (58 - 72)  BP: 116/64 (12 Feb 2019 10:18) (102/57 - 140/68)  BP(mean): --  RR: 20 (12 Feb 2019 10:18) (18 - 20)  SpO2: 98% (12 Feb 2019 10:18) (97% - 100%)I&O's Summary    11 Feb 2019 07:01  -  12 Feb 2019 07:00  --------------------------------------------------------  IN: 420 mL / OUT: 0 mL / NET: 420 mL      PHYSICAL EXAM:  GENERAL: Extreme obese   HEENT: PERRL, +EOMI, anicteric, no [place]. Conjunctival edema, legally blind.   NECK: Supple, No JVD   CHEST/LUNG: bilateral rale   HEART: S1S2 Normal intensity, no murmurs, gallops or rubs noted  ABDOMEN: Soft, BS Normoactive, NT, ND, no HSM noted  EXTREMITIES:  2+ radial and DP pulses noted, no clubbing, cyanosis, or edema noted, Bedbound   SKIN: No rashes or lesions noted  NEURO: A&O, no focal deficits noted, CN II-XII intact  PSYCH: Depressed mood and affect; insight/judgement appropriate  LABS:                        9.2    4.1   )-----------( 133      ( 12 Feb 2019 07:37 )             28.3     02-12    138  |  103  |  59.0<H>  ----------------------------<  149<H>  5.5<H>   |  26.0  |  1.45<H>    Ca    7.8<L>      12 Feb 2019 07:37    TPro  6.9  /  Alb  2.7<L>  /  TBili  0.6  /  DBili  x   /  AST  29  /  ALT  28  /  AlkPhos  188<H>  02-12    PT/INR - ( 10 Feb 2019 22:15 )   PT: 14.8 sec;   INR: 1.28 ratio         PTT - ( 10 Feb 2019 22:15 )  PTT:30.4 sec    RADIOLOGY & ADDITIONAL TESTS:    MEDICATIONS:  MEDICATIONS  (STANDING):  ALBUTerol    0.083% 10 milliGRAM(s) Nebulizer once  aspirin  chewable 81 milliGRAM(s) Oral daily  atorvastatin 80 milliGRAM(s) Oral at bedtime  carvedilol 25 milliGRAM(s) Oral every 12 hours  dextrose 5%. 1000 milliLiter(s) (50 mL/Hr) IV Continuous <Continuous>  dextrose 50% Injectable 12.5 Gram(s) IV Push once  dextrose 50% Injectable 25 Gram(s) IV Push once  dextrose 50% Injectable 25 Gram(s) IV Push once  docusate sodium 100 milliGRAM(s) Oral two times a day  DULoxetine 60 milliGRAM(s) Oral daily  ferrous sulfate Oral Tab/Cap - Peds 325 milliGRAM(s) Oral daily  furosemide   Injectable 40 milliGRAM(s) IV Push every 12 hours  gabapentin 100 milliGRAM(s) Oral three times a day  insulin lispro (HumaLOG) corrective regimen sliding scale   SubCutaneous three times a day before meals  isosorbide   mononitrate ER Tablet (IMDUR) 60 milliGRAM(s) Oral daily  magnesium oxide 400 milliGRAM(s) Oral daily  pantoprazole    Tablet 40 milliGRAM(s) Oral before breakfast    MEDICATIONS  (PRN):  dextrose 40% Gel 15 Gram(s) Oral once PRN Blood Glucose LESS THAN 70 milliGRAM(s)/deciliter  glucagon  Injectable 1 milliGRAM(s) IntraMuscular once PRN Glucose LESS THAN 70 milligrams/deciliter

## 2019-02-12 NOTE — PROGRESS NOTE ADULT - PROBLEM SELECTOR PLAN 1
History of Severe right sided heart failure.   On lasix now.   Will convert to torsemide once improved volume status.   Patient states that she was not taking diuretics at home? unclear.   Poor long term prognosis.   Will check BNP for am to assess volume status.

## 2019-02-12 NOTE — PROGRESS NOTE ADULT - ASSESSMENT
Pt is a 62 yo presenting to ED w/ SOB 2/2 diastolic HF exacerbation and noted to have KEITH and hyperkalemia, PMH CAD s/p CABG and PCI, PAD s/p baloon angioplasty, DM2, HTN, HLD, hx of cardiac arrest, recent GI bleed last month, Diastolic CHF, legally blind, pleural effusion requiring thoracentesis in Dec 2018.     Acute on Chronic Diastolic HF: Exacerbation,   Lasix 40mg iv bid   -[facility] if following   -Previous echo  showed pulm HTN and normal LVEF 60%.     Hyperkalemia: Repeated potassiums elevated, I started her treatment immediately after seeing the patient. No EKG changes, patient asymptomatic.   -will hold Losartan  -consult Nephro -Dr. Dunaway.     KEITH: KEITH on CKD, suspect due to her CHF and likely ARB use.   Holding ARB  -will avoid IVF in setting of volume overload.     HTN:   Coreg 25mg po bid      HLD: continue statin    CAD: s/p CABG and MADHAVI  - Recent stress test mildly positive for RCA disease  - Medically managed  -Losartan on hold and plavix on hold due to recent GIB  -ASA was resumed.   Imdur     PAD: s/p baloon angioplasty. Continue ASA. Hold Plavix due to recent GIB.     Anemia: Hgb at baseline, no signs of active bleeding. Continue to monitor.     Transaminitis: etiology unclear, likely 2/2 to her underlying HF. Will need to monitor and ensure resolution.     Depression: Duloxetine 60mg po daily     Legally blind ;  consult for discharge planning     DVT ppx: SCD's, no pharm dvt ppx due to recent GIB.           Electronic Signatures:

## 2019-02-12 NOTE — PROGRESS NOTE ADULT - ASSESSMENT
Hyperkalemia - finally normal; Cause unclear; Need to watch  RHF chr/Ac 0n IV Lasix; Cardiology noted  Dm  Htn  Labs am Hyperkalemia - finally normal; Cause unclear; Need to watch  RHF chr/Ac 0n IV Lasix; Cardiology noted  Anemia Tsat low - Add venofer ; FOBT  Dm  Htn  Labs am

## 2019-02-12 NOTE — PROGRESS NOTE ADULT - SUBJECTIVE AND OBJECTIVE BOX
Patient seen and examined    I&O's Summary    11 Feb 2019 07:01  -  12 Feb 2019 07:00  --------------------------------------------------------  IN: 420 mL / OUT: 0 mL / NET: 420 mL    12 Feb 2019 07:01  -  12 Feb 2019 20:24  --------------------------------------------------------  IN: 0 mL / OUT: 625 mL / NET: -625 mL    Lying comfortably  No loose BM despite 3 doses of Kayexalate    REVIEW OF SYSTEMS:    CONSTITUTIONAL: No F/C; Vision poor  RESPIRATORY: No cough,  No SOB  CARDIOVASCULAR: No CP/palpitations,    GASTROINTESTINAL: No abdominal pain  or NVD   GENITOURINARY: No UTI sx  NEUROLOGICAL: No headaches, NO wk/numbness  MUSCULOSKELETAL:   EXTREMITIES : Chr swelling,    Vital Signs Last 24 Hrs  T(C): 36.3 (12 Feb 2019 16:34), Max: 36.6 (12 Feb 2019 10:18)  T(F): 97.4 (12 Feb 2019 16:34), Max: 97.9 (12 Feb 2019 10:18)  HR: 63 (12 Feb 2019 16:34) (59 - 72)  BP: 136/69 (12 Feb 2019 16:34) (102/57 - 136/69)  BP(mean): --  RR: 20 (12 Feb 2019 16:34) (18 - 20)  SpO2: 94% (12 Feb 2019 16:34) (94% - 100%)    PHYSICAL EXAM:    GENERAL: NAD, Obese Pleasant  EYES:  conjunctiva and sclera clear  NECK: Supple, No JVD/Bruit  NERVOUS SYSTEM:  A/O x3,   CHEST:  No rales or rhonchi  HEART:  RRR, No murmur  ABDOMEN: Soft, NT/ND BS+  EXTREMITIES:  mild Edema;  SKIN: No rashes    LABS:                          9.2    4.1   )-----------( 133      ( 12 Feb 2019 07:37 )             28.3     02-12    135  |  99  |  55.0<H>  ----------------------------<  159<H>  4.5   |  26.0  |  1.40<H>    Ca    7.8<L>      12 Feb 2019 17:07    TPro  6.9  /  Alb  2.7<L>  /  TBili  0.6  /  DBili  x   /  AST  29  /  ALT  28  /  AlkPhos  188<H>  02-12      MEDICATIONS  (STANDING):  acetaminophen   Tablet .. PRN  ALBUTerol    0.083%  aspirin  chewable  atorvastatin  carvedilol  dextrose 40% Gel PRN  dextrose 5%.  dextrose 50% Injectable  dextrose 50% Injectable  dextrose 50% Injectable  docusate sodium  DULoxetine  ferrous sulfate Oral Tab/Cap - Peds  furosemide   Injectable  gabapentin  glucagon  Injectable PRN  insulin lispro (HumaLOG) corrective regimen sliding scale  isosorbide   mononitrate ER Tablet (IMDUR)  magnesium oxide  pantoprazole    Tablet

## 2019-02-13 DIAGNOSIS — I50.9 HEART FAILURE, UNSPECIFIED: ICD-10-CM

## 2019-02-13 LAB
ANION GAP SERPL CALC-SCNC: 11 MMOL/L — SIGNIFICANT CHANGE UP (ref 5–17)
ANION GAP SERPL CALC-SCNC: 9 MMOL/L — SIGNIFICANT CHANGE UP (ref 5–17)
BUN SERPL-MCNC: 56 MG/DL — HIGH (ref 8–20)
BUN SERPL-MCNC: 57 MG/DL — HIGH (ref 8–20)
CALCIUM SERPL-MCNC: 7.9 MG/DL — LOW (ref 8.6–10.2)
CALCIUM SERPL-MCNC: 7.9 MG/DL — LOW (ref 8.6–10.2)
CHLORIDE SERPL-SCNC: 101 MMOL/L — SIGNIFICANT CHANGE UP (ref 98–107)
CHLORIDE SERPL-SCNC: 102 MMOL/L — SIGNIFICANT CHANGE UP (ref 98–107)
CO2 SERPL-SCNC: 28 MMOL/L — SIGNIFICANT CHANGE UP (ref 22–29)
CO2 SERPL-SCNC: 30 MMOL/L — HIGH (ref 22–29)
CREAT SERPL-MCNC: 1.29 MG/DL — SIGNIFICANT CHANGE UP (ref 0.5–1.3)
CREAT SERPL-MCNC: 1.37 MG/DL — HIGH (ref 0.5–1.3)
GLUCOSE BLDC GLUCOMTR-MCNC: 128 MG/DL — HIGH (ref 70–99)
GLUCOSE BLDC GLUCOMTR-MCNC: 153 MG/DL — HIGH (ref 70–99)
GLUCOSE BLDC GLUCOMTR-MCNC: 203 MG/DL — HIGH (ref 70–99)
GLUCOSE BLDC GLUCOMTR-MCNC: 207 MG/DL — HIGH (ref 70–99)
GLUCOSE SERPL-MCNC: 153 MG/DL — HIGH (ref 70–115)
GLUCOSE SERPL-MCNC: 191 MG/DL — HIGH (ref 70–115)
HCT VFR BLD CALC: 28.6 % — LOW (ref 37–47)
HGB BLD-MCNC: 9.4 G/DL — LOW (ref 12–16)
MAGNESIUM SERPL-MCNC: 2.5 MG/DL — SIGNIFICANT CHANGE UP (ref 1.6–2.6)
MCHC RBC-ENTMCNC: 30.2 PG — SIGNIFICANT CHANGE UP (ref 27–31)
MCHC RBC-ENTMCNC: 32.9 G/DL — SIGNIFICANT CHANGE UP (ref 32–36)
MCV RBC AUTO: 92 FL — SIGNIFICANT CHANGE UP (ref 81–99)
NT-PROBNP SERPL-SCNC: 5867 PG/ML — HIGH (ref 0–300)
PLATELET # BLD AUTO: 130 K/UL — LOW (ref 150–400)
POTASSIUM SERPL-MCNC: 4.1 MMOL/L — SIGNIFICANT CHANGE UP (ref 3.5–5.3)
POTASSIUM SERPL-MCNC: 4.5 MMOL/L — SIGNIFICANT CHANGE UP (ref 3.5–5.3)
POTASSIUM SERPL-SCNC: 4.1 MMOL/L — SIGNIFICANT CHANGE UP (ref 3.5–5.3)
POTASSIUM SERPL-SCNC: 4.5 MMOL/L — SIGNIFICANT CHANGE UP (ref 3.5–5.3)
RBC # BLD: 3.11 M/UL — LOW (ref 4.4–5.2)
RBC # FLD: 16.9 % — HIGH (ref 11–15.6)
SODIUM SERPL-SCNC: 140 MMOL/L — SIGNIFICANT CHANGE UP (ref 135–145)
SODIUM SERPL-SCNC: 141 MMOL/L — SIGNIFICANT CHANGE UP (ref 135–145)
WBC # BLD: 3.8 K/UL — LOW (ref 4.8–10.8)
WBC # FLD AUTO: 3.8 K/UL — LOW (ref 4.8–10.8)

## 2019-02-13 PROCEDURE — 99232 SBSQ HOSP IP/OBS MODERATE 35: CPT

## 2019-02-13 PROCEDURE — 99233 SBSQ HOSP IP/OBS HIGH 50: CPT

## 2019-02-13 RX ORDER — ERYTHROPOIETIN 10000 [IU]/ML
20000 INJECTION, SOLUTION INTRAVENOUS; SUBCUTANEOUS
Qty: 0 | Refills: 0 | Status: DISCONTINUED | OUTPATIENT
Start: 2019-02-13 | End: 2019-03-26

## 2019-02-13 RX ADMIN — Medication 650 MILLIGRAM(S): at 12:26

## 2019-02-13 RX ADMIN — ERYTHROPOIETIN 20000 UNIT(S): 10000 INJECTION, SOLUTION INTRAVENOUS; SUBCUTANEOUS at 20:39

## 2019-02-13 RX ADMIN — CARVEDILOL PHOSPHATE 25 MILLIGRAM(S): 80 CAPSULE, EXTENDED RELEASE ORAL at 17:54

## 2019-02-13 RX ADMIN — Medication 2: at 12:27

## 2019-02-13 RX ADMIN — Medication 325 MILLIGRAM(S): at 12:27

## 2019-02-13 RX ADMIN — GABAPENTIN 100 MILLIGRAM(S): 400 CAPSULE ORAL at 12:27

## 2019-02-13 RX ADMIN — Medication 40 MILLIGRAM(S): at 05:19

## 2019-02-13 RX ADMIN — GABAPENTIN 100 MILLIGRAM(S): 400 CAPSULE ORAL at 22:09

## 2019-02-13 RX ADMIN — Medication 100 MILLIGRAM(S): at 17:54

## 2019-02-13 RX ADMIN — Medication 100 MILLIGRAM(S): at 05:19

## 2019-02-13 RX ADMIN — DULOXETINE HYDROCHLORIDE 60 MILLIGRAM(S): 30 CAPSULE, DELAYED RELEASE ORAL at 12:27

## 2019-02-13 RX ADMIN — Medication 81 MILLIGRAM(S): at 12:27

## 2019-02-13 RX ADMIN — CARVEDILOL PHOSPHATE 25 MILLIGRAM(S): 80 CAPSULE, EXTENDED RELEASE ORAL at 05:19

## 2019-02-13 RX ADMIN — IRON SUCROSE 110 MILLIGRAM(S): 20 INJECTION, SOLUTION INTRAVENOUS at 22:10

## 2019-02-13 RX ADMIN — Medication 650 MILLIGRAM(S): at 13:26

## 2019-02-13 RX ADMIN — Medication 1: at 17:54

## 2019-02-13 RX ADMIN — GABAPENTIN 100 MILLIGRAM(S): 400 CAPSULE ORAL at 05:19

## 2019-02-13 RX ADMIN — ISOSORBIDE MONONITRATE 60 MILLIGRAM(S): 60 TABLET, EXTENDED RELEASE ORAL at 12:27

## 2019-02-13 RX ADMIN — PANTOPRAZOLE SODIUM 40 MILLIGRAM(S): 20 TABLET, DELAYED RELEASE ORAL at 05:19

## 2019-02-13 RX ADMIN — Medication 40 MILLIGRAM(S): at 17:54

## 2019-02-13 RX ADMIN — ATORVASTATIN CALCIUM 80 MILLIGRAM(S): 80 TABLET, FILM COATED ORAL at 22:09

## 2019-02-13 RX ADMIN — MAGNESIUM OXIDE 400 MG ORAL TABLET 400 MILLIGRAM(S): 241.3 TABLET ORAL at 12:27

## 2019-02-13 NOTE — PROGRESS NOTE ADULT - SUBJECTIVE AND OBJECTIVE BOX
Heart failure    HPI:  Pt is a 62 yo presenting to ED w/ SOB, PMH CAD s/p CABG and PCI, PAD s/p baloon angioplasty, DM2, HTN, HLD, hx of cardiac arrest, recent GI bleed last month, Diastolic CHF, legally blind, pleural effusion requiring thoracentesis in Dec 2018.   Patient states that she has been having increasing SOB over the last week, she lives at home with her family, denies sick contacts, but has been having more exertional dyspnea. She states she walks with a walker but it is taking less activity for her dyspnea to occur. She has been compliant with all of her medications, she does not recall the names, but states that there have been no changes to her medication regimen since her discharge. She denies any associated chest pain or pressure. She has been taking her tosemide she believes. Denies PND or orthopnea, but she has noted increased LE edema.   Denies headaches, nausea, vomiting, chest pain, palpitations, abdominal pain, constipation, diarrhea, melena, hematochezia, dysuria.   Her SOB has since resolved. She is breathing comfortably, has no complaints.  In ED patient was noted to have hyperkalemia on repeat labs, troponin elevated at 0.09. Her creatinine is also elevated. She did not receive any potassium treatment initially so I ordered Calcium gluc, kayexelate, Insulin, dextrose, Albuterol. EKG without widened QRS or peaked T waves. (11 Feb 2019 03:58)    Interval History:  Patient was seen and examined at bedside around 8 am. Feeling better.   Denies chest pain, palpitations, shortness of breath, headache, dizziness, visual symptoms, nausea, vomiting or abdominal pain.  No overnight issues.    ROS:  As per interval history otherwise unremarkable.    PHYSICAL EXAM:  Vital Signs   T(C): 36.4 (13 Feb 2019 16:13), Max: 36.7 (13 Feb 2019 11:00)  T(F): 97.6 (13 Feb 2019 16:13), Max: 98.1 (13 Feb 2019 11:00)  HR: 58 (13 Feb 2019 16:13) (58 - 62)  BP: 113/58 (13 Feb 2019 16:13) (105/51 - 121/57)  RR: 18 (13 Feb 2019 16:13) (18 - 18)  SpO2: 96% (13 Feb 2019 16:13) (95% - 100%)  General: Well developed. Well nourished. No acute distress  HEENT: Opaque right cornea. EOMI. Clear conjunctivae. Moist mucus membrane  Neck: Supple. No JVD.   Chest: CTA bilaterally - no wheezing, rales or rhonchi.   Heart: Normal S1 & S2. RRR. No murmur.   Abdomen: Soft. Non-tender. Non-distended. + BS  Ext: 3+ pedal edema. No calf tenderness. 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    Neuro: AAO x 3. No focal deficit. No speech disorder  Skin: Warm and Dry  Psychiatry: Normal mood and affect    I&O's Summary    12 Feb 2019 07:01  -  13 Feb 2019 07:00  --------------------------------------------------------  IN: 300 mL / OUT: 625 mL / NET: -325 mL    13 Feb 2019 07:01  -  13 Feb 2019 16:57  --------------------------------------------------------  IN: 480 mL / OUT: 0 mL / NET: 480 mL    MEDICATIONS  (STANDING):  ALBUTerol    0.083% 10 milliGRAM(s) Nebulizer once  aspirin  chewable 81 milliGRAM(s) Oral daily  atorvastatin 80 milliGRAM(s) Oral at bedtime  carvedilol 25 milliGRAM(s) Oral every 12 hours  dextrose 5%. 1000 milliLiter(s) (50 mL/Hr) IV Continuous <Continuous>  dextrose 50% Injectable 12.5 Gram(s) IV Push once  dextrose 50% Injectable 25 Gram(s) IV Push once  dextrose 50% Injectable 25 Gram(s) IV Push once  docusate sodium 100 milliGRAM(s) Oral two times a day  DULoxetine 60 milliGRAM(s) Oral daily  epoetin sara Injectable 75026 Unit(s) SubCutaneous <User Schedule>  ferrous sulfate Oral Tab/Cap - Peds 325 milliGRAM(s) Oral daily  furosemide   Injectable 40 milliGRAM(s) IV Push every 12 hours  gabapentin 100 milliGRAM(s) Oral three times a day  insulin lispro (HumaLOG) corrective regimen sliding scale   SubCutaneous three times a day before meals  iron sucrose IVPB 200 milliGRAM(s) IV Intermittent every 24 hours  isosorbide   mononitrate ER Tablet (IMDUR) 60 milliGRAM(s) Oral daily  magnesium oxide 400 milliGRAM(s) Oral daily  pantoprazole    Tablet 40 milliGRAM(s) Oral before breakfast    MEDICATIONS  (PRN):  acetaminophen   Tablet .. 650 milliGRAM(s) Oral every 6 hours PRN Mild Pain (1 - 3)  dextrose 40% Gel 15 Gram(s) Oral once PRN Blood Glucose LESS THAN 70 milliGRAM(s)/deciliter  glucagon  Injectable 1 milliGRAM(s) IntraMuscular once PRN Glucose LESS THAN 70 milligrams/deciliter      LABS:  CAPILLARY BLOOD GLUCOSE  POCT Blood Glucose.: 207 mg/dL (13 Feb 2019 12:04)  POCT Blood Glucose.: 128 mg/dL (13 Feb 2019 08:18)  POCT Blood Glucose.: 206 mg/dL (12 Feb 2019 21:24)  POCT Blood Glucose.: 203 mg/dL (12 Feb 2019 17:26)                          9.4    3.8   )-----------( 130      ( 13 Feb 2019 05:32 )             28.6     02-13    140  |  101  |  57.0<H>  ----------------------------<  153<H>  4.5   |  28.0  |  1.29    Ca    7.9<L>      13 Feb 2019 05:32    TPro  6.9  /  Alb  2.7<L>  /  TBili  0.6  /  DBili  x   /  AST  29  /  ALT  28  /  AlkPhos  188<H>  02-12      RADIOLOGY & ADDITIONAL STUDIES:  Reviewed

## 2019-02-13 NOTE — PROGRESS NOTE ADULT - ASSESSMENT
Pt is a 62 yo presenting to ED w/ SOB 2/2 diastolic HF exacerbation and noted to have KEITH and hyperkalemia, PMH CAD s/p CABG and PCI, PAD s/p baloon angioplasty, DM2, HTN, HLD, hx of cardiac arrest, recent GI bleed last month, Diastolic CHF, legally blind, pleural effusion requiring thoracentesis in Dec 2018.     1) Acute on Chronic Diastolic Heart Failure  - Daily weight  - Strict intake/output  - Lasix 40 mg IVP BID   - Cardiology input following   2) Acute on chronic kidney injury  - Improving  - Avoid nephrotoxic medications  - Renal input appreciated  3) Hyperkalemia  - Improved  - Losartan on hold   4) CAD / PAD / HLD / HTN  - Continue Aspirin, Imdur, Lipitor and Coreg  - Plavix on hold since last admission due to GI Bleed  5) Anemia  - Chronic  - H&H stable  6) Transaminitis  - Likely secondary to liver congestion  - Improving  7) History of Depression  - Continue Duloxetine 60 mg     DVT Prophylaxis -- IPC Pt is a 64 yo presenting to ED w/ SOB 2/2 diastolic HF exacerbation and noted to have KEITH and hyperkalemia, PMH CAD s/p CABG and PCI, PAD s/p baloon angioplasty, DM2, HTN, HLD, hx of cardiac arrest, recent GI bleed last month, Diastolic CHF, legally blind, pleural effusion requiring thoracentesis in Dec 2018.     1) Acute on Chronic Diastolic Heart Failure  - Daily weight  - Strict intake/output  - Lasix 40 mg IVP BID   - Cardiology input following   2) Acute on chronic kidney injury  - Improving  - Avoid nephrotoxic medications  - Renal input appreciated  3) Hyperkalemia  - Improved  - Losartan on hold   4) CAD / PAD / HLD / HTN  - Continue Aspirin, Imdur, Lipitor and Coreg  - Plavix on hold since last admission due to GI Bleed  5) Anemia  - Chronic  - H&H stable  - Continue Venofer and Procrit  6) Transaminitis  - Likely secondary to liver congestion  - Improving  7) History of Depression  - Continue Duloxetine 60 mg     DVT Prophylaxis -- IPC

## 2019-02-13 NOTE — PROGRESS NOTE ADULT - PROBLEM SELECTOR PLAN 1
BNP improved, 2.2kg weight loss overnight, Physical exam still with s/s of failure  Continue IV Lasix   continue coreg, isosorbide  Potassium 4.5 with hemolysis will repeat as pt on IV lasix  Will put patient on 1L fluid restriction diet   Will instruct staff on strict i/o and daily weight

## 2019-02-13 NOTE — PROGRESS NOTE ADULT - ASSESSMENT
ARF: decompensated CHF (R > L)  Pre renal azotemia  - cont IV Lasix for now  - daily weights and adjust dose accordingly  - monitor labs  - check urine prot:creat    Anemia:   - cont IV Fe  - add QUENTIN  - trend H/H

## 2019-02-13 NOTE — PROGRESS NOTE ADULT - SUBJECTIVE AND OBJECTIVE BOX
NEPHROLOGY INTERVAL HPI/OVERNIGHT EVENTS:  pt comfortable when seen earlier  no acute distress noted  no acute sob, cp, n/v/d    MEDICATIONS  (STANDING):  ALBUTerol    0.083% 10 milliGRAM(s) Nebulizer once  aspirin  chewable 81 milliGRAM(s) Oral daily  atorvastatin 80 milliGRAM(s) Oral at bedtime  carvedilol 25 milliGRAM(s) Oral every 12 hours  dextrose 5%. 1000 milliLiter(s) (50 mL/Hr) IV Continuous <Continuous>  dextrose 50% Injectable 12.5 Gram(s) IV Push once  dextrose 50% Injectable 25 Gram(s) IV Push once  dextrose 50% Injectable 25 Gram(s) IV Push once  docusate sodium 100 milliGRAM(s) Oral two times a day  DULoxetine 60 milliGRAM(s) Oral daily  ferrous sulfate Oral Tab/Cap - Peds 325 milliGRAM(s) Oral daily  furosemide   Injectable 40 milliGRAM(s) IV Push every 12 hours  gabapentin 100 milliGRAM(s) Oral three times a day  insulin lispro (HumaLOG) corrective regimen sliding scale   SubCutaneous three times a day before meals  iron sucrose IVPB 200 milliGRAM(s) IV Intermittent every 24 hours  isosorbide   mononitrate ER Tablet (IMDUR) 60 milliGRAM(s) Oral daily  magnesium oxide 400 milliGRAM(s) Oral daily  pantoprazole    Tablet 40 milliGRAM(s) Oral before breakfast    MEDICATIONS  (PRN):  acetaminophen   Tablet .. 650 milliGRAM(s) Oral every 6 hours PRN Mild Pain (1 - 3)  dextrose 40% Gel 15 Gram(s) Oral once PRN Blood Glucose LESS THAN 70 milliGRAM(s)/deciliter  glucagon  Injectable 1 milliGRAM(s) IntraMuscular once PRN Glucose LESS THAN 70 milligrams/deciliter      Allergies    Accupril (Other)        Vital Signs Last 24 Hrs  T(C): 36.3 (13 Feb 2019 05:16), Max: 36.4 (12 Feb 2019 21:30)  T(F): 97.4 (13 Feb 2019 05:16), Max: 97.5 (12 Feb 2019 21:30)  HR: 60 (13 Feb 2019 05:16) (60 - 63)  BP: 121/57 (13 Feb 2019 05:16) (113/66 - 136/69)  BP(mean): --  RR: 18 (13 Feb 2019 05:16) (18 - 20)  SpO2: 100% (13 Feb 2019 05:16) (94% - 100%)    PHYSICAL EXAM:  NAD  Cardiovascular: Normal S1 S2, No rub  Respiratory: Clear; diminished BS at bases	  Neuro: A & O x 3; non focal  Gastrointestinal:  Soft, Non-tender, + BS	  Extremities: trace LE edema    LABS:                        9.4    3.8   )-----------( 130      ( 13 Feb 2019 05:32 )             28.6     02-13    140  |  101  |  57.0<H>  ----------------------------<  153<H>  4.5   |  28.0  |  1.29    Creatinine, Serum: 1.40 mg/dL (02.12.19 @ 17:07)        Ca    7.9<L>      13 Feb 2019 05:32    TPro  6.9  /  Alb  2.7<L>  /  TBili  0.6  /  DBili  x   /  AST  29  /  ALT  28  /  AlkPhos  188<H>  02-12            RADIOLOGY & ADDITIONAL TESTS:  < from: Xray Chest 1 View AP/PA. (02.10.19 @ 20:31) >     EXAM:  XR CHEST AP OR PA 1V                          PROCEDURE DATE:  02/10/2019          INTERPRETATION:  TECHNIQUE: Single portable view of the chest.    COMPARISON: 1/13/2019    CLINICAL HISTORY: Shortness of breath.     FINDINGS:    Single frontal view of the chest demonstrates moderate CHF. Small   right-sided pleural effusion. Mediastinal sternotomy wires. The   cardiomediastinal silhouette is normal. No acute osseous abnormalities.   Overlying EKG leads and wires are noted.    IMPRESSION: Moderate CHF. Small right-sided pleural effusion.    < end of copied text >

## 2019-02-13 NOTE — PROGRESS NOTE ADULT - ASSESSMENT
Pt is a 64 yo presenting to ED w/ SOB, PMH CAD s/p CABG and PCI, PAD s/p baloon angioplasty, DM2, HTN, HLD, hx of cardiac arrest, recent GI bleed last month, Diastolic CHF, legally blind, pleural effusion requiring thoracentesis in Dec 2018. Pt being followed by Cardiology this admission for SOB. Pt with CHF exacerbation receiving IV furosemide 40mg BID. Physical exam is notable for bibasilar coarse crackles. BNP is improved - [id number]. Patient endorses large amounts of urinary output. Daily weight 2.2 Kg weight loss overnight. telemetry with no events

## 2019-02-13 NOTE — PROGRESS NOTE ADULT - SUBJECTIVE AND OBJECTIVE BOX
CC:  Patient is a 63y old  Female who presents with a chief complaint of SOB (13 Feb 2019 11:25)      HPI:  Pt is a 62 yo presenting to ED w/ SOB, PMH CAD s/p CABG and PCI, PAD s/p baloon angioplasty, DM2, HTN, HLD, hx of cardiac arrest, recent GI bleed last month, Diastolic CHF, legally blind, pleural effusion requiring thoracentesis in Dec 2018.   Patient states that she has been having increasing SOB over the last week, she lives at home with her family, denies sick contacts, but has been having more exertional dyspnea. She states she walks with a walker but it is taking less activity for her dyspnea to occur. She has been compliant with all of her medications, she does not recall the names, but states that there have been no changes to her medication regimen since her discharge. She denies any associated chest pain or pressure. She has been taking her tosemide she believes. Denies PND or orthopnea, but she has noted increased LE edema.   Denies headaches, nausea, vomiting, chest pain, palpitations, abdominal pain, constipation, diarrhea, melena, hematochezia, dysuria.   Her SOB has since resolved. She is breathing comfortably, has no complaints.  In ED patient was noted to have hyperkalemia on repeat labs, troponin elevated at 0.09. Her creatinine is also elevated. She did not receive any potassium treatment initially so I ordered Calcium gluc, kayexelate, Insulin, dextrose, Albuterol. EKG without widened QRS or peaked T waves. (11 Feb 2019 03:58)      ROS: All review of systems negative unless indicated otherwise above.     Lab Results:  CBC  CBC Full  -  ( 13 Feb 2019 05:32 )  WBC Count : 3.8 K/uL  Hemoglobin : 9.4 g/dL  Hematocrit : 28.6 %  Platelet Count - Automated : 130 K/uL  Mean Cell Volume : 92.0 fl  Mean Cell Hemoglobin : 30.2 pg  Mean Cell Hemoglobin Concentration : 32.9 g/dL  Auto Neutrophil # : x  Auto Lymphocyte # : x  Auto Monocyte # : x  Auto Eosinophil # : x  Auto Basophil # : x  Auto Neutrophil % : x  Auto Lymphocyte % : x  Auto Monocyte % : x  Auto Eosinophil % : x  Auto Basophil % : x    .		Differential:	[] Automated		[] Manual  Chemistry                        9.4    3.8   )-----------( 130      ( 13 Feb 2019 05:32 )             28.6     02-13    140  |  101  |  57.0<H>  ----------------------------<  153<H>  4.5   |  28.0  |  1.29    Ca    7.9<L>      13 Feb 2019 05:32    TPro  6.9  /  Alb  2.7<L>  /  TBili  0.6  /  DBili  x   /  AST  29  /  ALT  28  /  AlkPhos  188<H>  02-12    LIVER FUNCTIONS - ( 12 Feb 2019 07:37 )  Alb: 2.7 g/dL / Pro: 6.9 g/dL / ALK PHOS: 188 U/L / ALT: 28 U/L / AST: 29 U/L / GGT: x           MEDICATIONS  (STANDING):  ALBUTerol    0.083% 10 milliGRAM(s) Nebulizer once  aspirin  chewable 81 milliGRAM(s) Oral daily  atorvastatin 80 milliGRAM(s) Oral at bedtime  carvedilol 25 milliGRAM(s) Oral every 12 hours  dextrose 5%. 1000 milliLiter(s) IV Continuous <Continuous>  dextrose 50% Injectable 12.5 Gram(s) IV Push once  dextrose 50% Injectable 25 Gram(s) IV Push once  dextrose 50% Injectable 25 Gram(s) IV Push once  docusate sodium 100 milliGRAM(s) Oral two times a day  DULoxetine 60 milliGRAM(s) Oral daily  epoetin sara Injectable 29247 Unit(s) SubCutaneous <User Schedule>  ferrous sulfate Oral Tab/Cap - Peds 325 milliGRAM(s) Oral daily  furosemide   Injectable 40 milliGRAM(s) IV Push every 12 hours  gabapentin 100 milliGRAM(s) Oral three times a day  insulin lispro (HumaLOG) corrective regimen sliding scale   SubCutaneous three times a day before meals  iron sucrose IVPB 200 milliGRAM(s) IV Intermittent every 24 hours  isosorbide   mononitrate ER Tablet (IMDUR) 60 milliGRAM(s) Oral daily  magnesium oxide 400 milliGRAM(s) Oral daily  pantoprazole    Tablet 40 milliGRAM(s) Oral before breakfast    MEDICATIONS  (PRN):  acetaminophen   Tablet .. 650 milliGRAM(s) Oral every 6 hours PRN Mild Pain (1 - 3)  dextrose 40% Gel 15 Gram(s) Oral once PRN Blood Glucose LESS THAN 70 milliGRAM(s)/deciliter  glucagon  Injectable 1 milliGRAM(s) IntraMuscular once PRN Glucose LESS THAN 70 milligrams/deciliter      PHYSICAL EXAM:  Vital Signs Last 24 Hrs  T(C): 36.7 (13 Feb 2019 11:00), Max: 36.7 (13 Feb 2019 11:00)  T(F): 98.1 (13 Feb 2019 11:00), Max: 98.1 (13 Feb 2019 11:00)  HR: 61 (13 Feb 2019 11:00) (60 - 63)  BP: 105/51 (13 Feb 2019 11:00) (105/51 - 136/69)  BP(mean): --  RR: 18 (13 Feb 2019 11:00) (18 - 20)  SpO2: 95% (13 Feb 2019 11:00) (94% - 100%)    GENERAL: NAD, well-groomed, well-developed  HEAD:  Atraumatic, Normocephalic  NECK: Supple, No JVD  NERVOUS SYSTEM:  Alert & Oriented X3, Good concentration; Motor Strength 5/5 B/L upper and lower extremities, sensation intact  CHEST/LUNG: Diminished to auscultation bilaterally, Bibasilar crackles, RML crackles, Upper lobes clear but diminished.  No rales, rhonchi, wheezing, or rubs  HEART: Regular rate and rhythm; s1 and s2 auscultated, No murmurs, rubs, or gallops  ABDOMEN: Obese, Soft, Nontender, Nondistended; Bowel sounds present and normoactive.   EXTREMITIES:  2+ Peripheral Pulses, No clubbing, cyanosis. Trace pedal edema.   SKIN: B/L LE dry flaky skin.

## 2019-02-14 DIAGNOSIS — G47.33 OBSTRUCTIVE SLEEP APNEA (ADULT) (PEDIATRIC): ICD-10-CM

## 2019-02-14 DIAGNOSIS — J96.21 ACUTE AND CHRONIC RESPIRATORY FAILURE WITH HYPOXIA: ICD-10-CM

## 2019-02-14 DIAGNOSIS — I50.9 HEART FAILURE, UNSPECIFIED: ICD-10-CM

## 2019-02-14 DIAGNOSIS — E66.2 MORBID (SEVERE) OBESITY WITH ALVEOLAR HYPOVENTILATION: ICD-10-CM

## 2019-02-14 DIAGNOSIS — J96.02 ACUTE RESPIRATORY FAILURE WITH HYPERCAPNIA: ICD-10-CM

## 2019-02-14 LAB
ANION GAP SERPL CALC-SCNC: 7 MMOL/L — SIGNIFICANT CHANGE UP (ref 5–17)
BASE EXCESS BLDA CALC-SCNC: 5.9 MMOL/L — HIGH (ref -2–2)
BLOOD GAS COMMENTS ARTERIAL: SIGNIFICANT CHANGE UP
BLOOD GAS COMMENTS ARTERIAL: SIGNIFICANT CHANGE UP
BUN SERPL-MCNC: 54 MG/DL — HIGH (ref 8–20)
CALCIUM SERPL-MCNC: 8.1 MG/DL — LOW (ref 8.6–10.2)
CHLORIDE SERPL-SCNC: 100 MMOL/L — SIGNIFICANT CHANGE UP (ref 98–107)
CO2 SERPL-SCNC: 32 MMOL/L — HIGH (ref 22–29)
CREAT SERPL-MCNC: 1.23 MG/DL — SIGNIFICANT CHANGE UP (ref 0.5–1.3)
GAS PNL BLDA: SIGNIFICANT CHANGE UP
GAS PNL BLDA: SIGNIFICANT CHANGE UP
GLUCOSE BLDC GLUCOMTR-MCNC: 158 MG/DL — HIGH (ref 70–99)
GLUCOSE BLDC GLUCOMTR-MCNC: 167 MG/DL — HIGH (ref 70–99)
GLUCOSE BLDC GLUCOMTR-MCNC: 167 MG/DL — HIGH (ref 70–99)
GLUCOSE BLDC GLUCOMTR-MCNC: 172 MG/DL — HIGH (ref 70–99)
GLUCOSE SERPL-MCNC: 147 MG/DL — HIGH (ref 70–115)
HCO3 BLDA-SCNC: 30 MMOL/L — HIGH (ref 20–26)
HOROWITZ INDEX BLDA+IHG-RTO: SIGNIFICANT CHANGE UP
MAGNESIUM SERPL-MCNC: 2.5 MG/DL — SIGNIFICANT CHANGE UP (ref 1.6–2.6)
PCO2 BLDA: 70 MMHG — CRITICAL HIGH (ref 35–45)
PH BLDA: 7.3 — LOW (ref 7.35–7.45)
PO2 BLDA: 59 MMHG — LOW (ref 83–108)
POTASSIUM SERPL-MCNC: 4 MMOL/L — SIGNIFICANT CHANGE UP (ref 3.5–5.3)
POTASSIUM SERPL-SCNC: 4 MMOL/L — SIGNIFICANT CHANGE UP (ref 3.5–5.3)
SAO2 % BLDA: 90 % — LOW (ref 95–99)
SODIUM SERPL-SCNC: 139 MMOL/L — SIGNIFICANT CHANGE UP (ref 135–145)

## 2019-02-14 PROCEDURE — 99233 SBSQ HOSP IP/OBS HIGH 50: CPT

## 2019-02-14 PROCEDURE — 99232 SBSQ HOSP IP/OBS MODERATE 35: CPT

## 2019-02-14 PROCEDURE — 71045 X-RAY EXAM CHEST 1 VIEW: CPT | Mod: 26

## 2019-02-14 PROCEDURE — 99291 CRITICAL CARE FIRST HOUR: CPT

## 2019-02-14 RX ORDER — FUROSEMIDE 40 MG
40 TABLET ORAL ONCE
Qty: 0 | Refills: 0 | Status: DISCONTINUED | OUTPATIENT
Start: 2019-02-14 | End: 2019-02-14

## 2019-02-14 RX ORDER — FUROSEMIDE 40 MG
5 TABLET ORAL
Qty: 500 | Refills: 0 | Status: DISCONTINUED | OUTPATIENT
Start: 2019-02-14 | End: 2019-02-16

## 2019-02-14 RX ORDER — MORPHINE SULFATE 50 MG/1
2 CAPSULE, EXTENDED RELEASE ORAL ONCE
Qty: 0 | Refills: 0 | Status: DISCONTINUED | OUTPATIENT
Start: 2019-02-14 | End: 2019-02-14

## 2019-02-14 RX ADMIN — IRON SUCROSE 110 MILLIGRAM(S): 20 INJECTION, SOLUTION INTRAVENOUS at 21:56

## 2019-02-14 RX ADMIN — Medication 2.5 MG/HR: at 16:26

## 2019-02-14 RX ADMIN — PANTOPRAZOLE SODIUM 40 MILLIGRAM(S): 20 TABLET, DELAYED RELEASE ORAL at 05:44

## 2019-02-14 RX ADMIN — Medication 100 MILLIGRAM(S): at 05:44

## 2019-02-14 RX ADMIN — GABAPENTIN 100 MILLIGRAM(S): 400 CAPSULE ORAL at 05:45

## 2019-02-14 RX ADMIN — CARVEDILOL PHOSPHATE 25 MILLIGRAM(S): 80 CAPSULE, EXTENDED RELEASE ORAL at 05:44

## 2019-02-14 RX ADMIN — ISOSORBIDE MONONITRATE 60 MILLIGRAM(S): 60 TABLET, EXTENDED RELEASE ORAL at 13:04

## 2019-02-14 RX ADMIN — Medication 650 MILLIGRAM(S): at 03:15

## 2019-02-14 RX ADMIN — MORPHINE SULFATE 2 MILLIGRAM(S): 50 CAPSULE, EXTENDED RELEASE ORAL at 04:36

## 2019-02-14 RX ADMIN — Medication 650 MILLIGRAM(S): at 02:17

## 2019-02-14 RX ADMIN — MAGNESIUM OXIDE 400 MG ORAL TABLET 400 MILLIGRAM(S): 241.3 TABLET ORAL at 13:04

## 2019-02-14 RX ADMIN — Medication 325 MILLIGRAM(S): at 13:03

## 2019-02-14 RX ADMIN — GABAPENTIN 100 MILLIGRAM(S): 400 CAPSULE ORAL at 13:03

## 2019-02-14 RX ADMIN — MORPHINE SULFATE 2 MILLIGRAM(S): 50 CAPSULE, EXTENDED RELEASE ORAL at 05:30

## 2019-02-14 RX ADMIN — Medication 1: at 13:03

## 2019-02-14 RX ADMIN — DULOXETINE HYDROCHLORIDE 60 MILLIGRAM(S): 30 CAPSULE, DELAYED RELEASE ORAL at 13:04

## 2019-02-14 RX ADMIN — Medication 40 MILLIGRAM(S): at 05:44

## 2019-02-14 RX ADMIN — Medication 81 MILLIGRAM(S): at 13:03

## 2019-02-14 NOTE — PROGRESS NOTE ADULT - ASSESSMENT
Pt is a 62 yo presenting to ED w/ SOB 2/2 diastolic HF exacerbation and noted to have KEITH and hyperkalemia, PMH CAD s/p CABG and PCI, PAD s/p baloon angioplasty, DM2, HTN, HLD, hx of cardiac arrest, recent GI bleed last month, Diastolic CHF, legally blind, pleural effusion requiring thoracentesis in Dec 2018.     1) Acute on Chronic Diastolic Heart Failure  - Daily weight  - Strict intake/output  - Lasix 40 mg IVP BID   - Cardiology input following   2) Acute on chronic kidney injury  - Improving  - Avoid nephrotoxic medications  - Renal input appreciated  3) Hyperkalemia  - Improved  - Losartan on hold   4) OHS/KYREE  - Supposed to be on Trilogy at home as per Pulmonary Note from previous admission  - ABG was done around 2 pm for persistent lethargy and she was started on BIPAP. Repeat ABG in 2 hours.  - ICU Consulted  - Pulmonary Consult  5) CAD / PAD / HLD / HTN  - Continue Aspirin, Imdur, Lipitor and Coreg  - Plavix on hold since last admission due to GI Bleed  6) Anemia  - Chronic  - H&H stable  - Continue Venofer and Procrit  - Continue Ferrous Sulfate 325 mg daily  7) Transaminitis  - Likely secondary to liver congestion  - Improving  8) History of Depression  - Continue Duloxetine 60 mg     DVT Prophylaxis -- IPC Pt is a 64 yo presenting to ED w/ SOB 2/2 diastolic HF exacerbation and noted to have KEITH and hyperkalemia, PMH CAD s/p CABG and PCI, PAD s/p baloon angioplasty, DM2, HTN, HLD, hx of cardiac arrest, recent GI bleed last month, Diastolic CHF, legally blind, pleural effusion requiring thoracentesis in Dec 2018.     1) Acute on Chronic Diastolic Heart Failure  - Daily weight  - Strict intake/output  - Lasix 40 mg IVP BID   - Cardiology input following   2) Acute on chronic kidney injury  - Improving  - Avoid nephrotoxic medications  - Renal input appreciated  3) Hyperkalemia  - Improved  - Losartan on hold   4) OHS/KYREE  - Supposed to be on Trilogy at home as per Pulmonary Note from previous admission  - ABG was done around 2 pm for persistent lethargy and she was started on BIPAP. Repeat ABG in 2 hours.  - ICU Consulted  - Pulmonary Consult  - Avoid Narcotics for pain  5) CAD / PAD / HLD / HTN  - Continue Aspirin, Imdur, Lipitor and Coreg  - Plavix on hold since last admission due to GI Bleed  6) Anemia  - Chronic  - H&H stable  - Continue Venofer and Procrit  - Continue Ferrous Sulfate 325 mg daily  7) Transaminitis  - Likely secondary to liver congestion  - Improving  8) History of Depression  - Continue Duloxetine 60 mg     DVT Prophylaxis -- IPC Pt is a 64 yo presenting to ED w/ SOB 2/2 diastolic HF exacerbation and noted to have KEITH and hyperkalemia, PMH CAD s/p CABG and PCI, PAD s/p baloon angioplasty, DM2, HTN, HLD, hx of cardiac arrest, recent GI bleed last month, Diastolic CHF, legally blind, pleural effusion requiring thoracentesis in Dec 2018.     1) Acute on Chronic Diastolic Heart Failure  - Daily weight  - Strict intake/output  - Lasix 40 mg IVP BID   - Cardiology input following   2) Acute on chronic kidney injury  - Improving  - Avoid nephrotoxic medications  - Renal input appreciated  3) Hyperkalemia  - Improved  - Losartan on hold   4) Acute on Chronic Hypercapnic Respiratory Failure  - H/O OHS/KYREE  - Supposed to be on Trilogy at home as per Pulmonary Note from previous admission  - ABG was done around 2 pm for persistent lethargy and she was started on BIPAP. Repeat ABG in 2 hours.  - ICU Consulted  - Pulmonary Consult  - Avoid Narcotics for pain  5) CAD / PAD / HLD / HTN  - Continue Aspirin, Imdur, Lipitor and Coreg  - Plavix on hold since last admission due to GI Bleed  6) Anemia  - Chronic  - H&H stable  - Continue Venofer and Procrit  - Continue Ferrous Sulfate 325 mg daily  7) Transaminitis  - Likely secondary to liver congestion  - Improving  8) History of Depression  - Continue Duloxetine 60 mg     DVT Prophylaxis -- IPC

## 2019-02-14 NOTE — PROGRESS NOTE ADULT - SUBJECTIVE AND OBJECTIVE BOX
CC:  Patient is a 63y old  Female who presents with a chief complaint of SOB (14 Feb 2019 11:53)      HPI:  Pt is a 62 yo presenting to ED w/ SOB, PMH CAD s/p CABG and PCI, PAD s/p baloon angioplasty, DM2, HTN, HLD, hx of cardiac arrest, recent GI bleed last month, Diastolic CHF, legally blind, pleural effusion requiring thoracentesis in Dec 2018.   Patient states that she has been having increasing SOB over the last week, she lives at home with her family, denies sick contacts, but has been having more exertional dyspnea. She states she walks with a walker but it is taking less activity for her dyspnea to occur. She has been compliant with all of her medications, she does not recall the names, but states that there have been no changes to her medication regimen since her discharge. She denies any associated chest pain or pressure. She has been taking her tosemide she believes. Denies PND or orthopnea, but she has noted increased LE edema.   Denies headaches, nausea, vomiting, chest pain, palpitations, abdominal pain, constipation, diarrhea, melena, hematochezia, dysuria.   Her SOB has since resolved. She is breathing comfortably, has no complaints.  In ED patient was noted to have hyperkalemia on repeat labs, troponin elevated at 0.09. Her creatinine is also elevated. She did not receive any potassium treatment initially so I ordered Calcium gluc, kayexelate, Insulin, dextrose, Albuterol. EKG without widened QRS or peaked T waves. (11 Feb 2019 03:58)      ROS: All review of systems negative unless indicated otherwise below.     Lab Results:  CBC  CBC Full  -  ( 13 Feb 2019 05:32 )  WBC Count : 3.8 K/uL  Hemoglobin : 9.4 g/dL  Hematocrit : 28.6 %  Platelet Count - Automated : 130 K/uL  Mean Cell Volume : 92.0 fl  Mean Cell Hemoglobin : 30.2 pg  Mean Cell Hemoglobin Concentration : 32.9 g/dL  Auto Neutrophil # : x  Auto Lymphocyte # : x  Auto Monocyte # : x  Auto Eosinophil # : x  Auto Basophil # : x  Auto Neutrophil % : x  Auto Lymphocyte % : x  Auto Monocyte % : x  Auto Eosinophil % : x  Auto Basophil % : x    .		Differential:	[] Automated		[] Manual  Chemistry                        9.4    3.8   )-----------( 130      ( 13 Feb 2019 05:32 )             28.6     02-14    139  |  100  |  54.0<H>  ----------------------------<  147<H>  4.0   |  32.0<H>  |  1.23    Ca    8.1<L>      14 Feb 2019 07:13  Mg     2.5     02-14    ABG - ( 14 Feb 2019 14:04 )  pH, Arterial: 7.27  pH, Blood: x     /  pCO2: 73    /  pO2: 100   / HCO3: 29    / Base Excess: 5.2   /  SaO2: 98          RADIOLOGY RESULTS: Personally visualized and reviewed  < from: Xray Chest 1 View AP/PA. (02.14.19 @ 14:45) >  IMPRESSION: CHF again noted, possibly somewhat increased from February 10.    < end of copied text >    MEDICATIONS  (STANDING):  ALBUTerol    0.083% 10 milliGRAM(s) Nebulizer once  aspirin  chewable 81 milliGRAM(s) Oral daily  atorvastatin 80 milliGRAM(s) Oral at bedtime  carvedilol 25 milliGRAM(s) Oral every 12 hours  dextrose 5%. 1000 milliLiter(s) IV Continuous <Continuous>  dextrose 50% Injectable 12.5 Gram(s) IV Push once  dextrose 50% Injectable 25 Gram(s) IV Push once  dextrose 50% Injectable 25 Gram(s) IV Push once  docusate sodium 100 milliGRAM(s) Oral two times a day  DULoxetine 60 milliGRAM(s) Oral daily  epoetin sara Injectable 50081 Unit(s) SubCutaneous <User Schedule>  ferrous sulfate Oral Tab/Cap - Peds 325 milliGRAM(s) Oral daily  furosemide   Injectable 40 milliGRAM(s) IV Push every 12 hours  gabapentin 100 milliGRAM(s) Oral three times a day  insulin lispro (HumaLOG) corrective regimen sliding scale   SubCutaneous three times a day before meals  iron sucrose IVPB 200 milliGRAM(s) IV Intermittent every 24 hours  isosorbide   mononitrate ER Tablet (IMDUR) 60 milliGRAM(s) Oral daily  magnesium oxide 400 milliGRAM(s) Oral daily  pantoprazole    Tablet 40 milliGRAM(s) Oral before breakfast    MEDICATIONS  (PRN):  acetaminophen   Tablet .. 650 milliGRAM(s) Oral every 6 hours PRN Mild Pain (1 - 3)  dextrose 40% Gel 15 Gram(s) Oral once PRN Blood Glucose LESS THAN 70 milliGRAM(s)/deciliter  glucagon  Injectable 1 milliGRAM(s) IntraMuscular once PRN Glucose LESS THAN 70 milligrams/deciliter      PHYSICAL EXAM:  Vital Signs Last 24 Hrs  T(C): 36.3 (14 Feb 2019 10:30), Max: 36.4 (13 Feb 2019 16:13)  T(F): 97.4 (14 Feb 2019 10:30), Max: 97.6 (13 Feb 2019 16:13)  HR: 58 (14 Feb 2019 14:22) (55 - 58)  BP: 150/68 (14 Feb 2019 13:25) (113/58 - 150/68)  BP(mean): --  RR: 16 (14 Feb 2019 13:25) (16 - 18)  SpO2: 100% (14 Feb 2019 14:22) (94% - 100%)    GENERAL: lethargic, arousable  HEAD:  Atraumatic, Normocephalic  NECK: Supple, No JVD  NERVOUS SYSTEM:  Alert & Oriented X3, poor concentration; Motor Strength 5/5 B/L upper and lower extremities, sensation intact  CHEST/LUNG: crackles bilaterally   HEART: Regular rate and rhythm; s1 and s2 auscultated, No murmurs, rubs, or gallops  ABDOMEN: morbidly obese, Soft, Nontender, Nondistended; Bowel sounds present and normoactive.   EXTREMITIES:  2+ Peripheral Pulses, No clubbing, cyanosis, nonpitting edema b/l LE

## 2019-02-14 NOTE — PROGRESS NOTE ADULT - ASSESSMENT
Pt is a 64 yo presenting to ED w/ SOB, PMH CAD s/p CABG and PCI, PAD s/p baloon angioplasty, DM2, HTN, HLD, hx of cardiac arrest, recent GI bleed last month, Diastolic CHF, legally blind, pleural effusion requiring thoracentesis in Dec 2018. Pt being followed by Cardiology this admission for SOB. Pt with CHF exacerbation receiving IV furosemide 40mg BID. Patient evaluated at bedside for worsening lethargy, AB.27/73/100/29, Bipap ordered 10/5 40%, Chest Xray ordered showing worsening pulmonary edema and physical exam is notable for bilateral crackles. Patient with 5kg weight gain overnight.

## 2019-02-14 NOTE — PROGRESS NOTE ADULT - SUBJECTIVE AND OBJECTIVE BOX
NEPHROLOGY INTERVAL HPI/OVERNIGHT EVENTS:  pt comfortable when seen earlier  no acute distress noted    MEDICATIONS  (STANDING):  ALBUTerol    0.083% 10 milliGRAM(s) Nebulizer once  aspirin  chewable 81 milliGRAM(s) Oral daily  atorvastatin 80 milliGRAM(s) Oral at bedtime  carvedilol 25 milliGRAM(s) Oral every 12 hours  dextrose 5%. 1000 milliLiter(s) (50 mL/Hr) IV Continuous <Continuous>  dextrose 50% Injectable 12.5 Gram(s) IV Push once  dextrose 50% Injectable 25 Gram(s) IV Push once  dextrose 50% Injectable 25 Gram(s) IV Push once  docusate sodium 100 milliGRAM(s) Oral two times a day  DULoxetine 60 milliGRAM(s) Oral daily  epoetin sara Injectable 52172 Unit(s) SubCutaneous <User Schedule>  ferrous sulfate Oral Tab/Cap - Peds 325 milliGRAM(s) Oral daily  furosemide   Injectable 40 milliGRAM(s) IV Push every 12 hours  gabapentin 100 milliGRAM(s) Oral three times a day  insulin lispro (HumaLOG) corrective regimen sliding scale   SubCutaneous three times a day before meals  iron sucrose IVPB 200 milliGRAM(s) IV Intermittent every 24 hours  isosorbide   mononitrate ER Tablet (IMDUR) 60 milliGRAM(s) Oral daily  magnesium oxide 400 milliGRAM(s) Oral daily  pantoprazole    Tablet 40 milliGRAM(s) Oral before breakfast    MEDICATIONS  (PRN):  acetaminophen   Tablet .. 650 milliGRAM(s) Oral every 6 hours PRN Mild Pain (1 - 3)  dextrose 40% Gel 15 Gram(s) Oral once PRN Blood Glucose LESS THAN 70 milliGRAM(s)/deciliter  glucagon  Injectable 1 milliGRAM(s) IntraMuscular once PRN Glucose LESS THAN 70 milligrams/deciliter      Allergies    Accupril (Other)          Vital Signs Last 24 Hrs  T(C): 36.3 (14 Feb 2019 10:30), Max: 36.4 (13 Feb 2019 16:13)  T(F): 97.4 (14 Feb 2019 10:30), Max: 97.6 (13 Feb 2019 16:13)  HR: 56 (14 Feb 2019 10:30) (56 - 58)  BP: 149/63 (14 Feb 2019 10:30) (113/58 - 149/63)  BP(mean): --  RR: 18 (14 Feb 2019 10:30) (18 - 18)  SpO2: 98% (14 Feb 2019 10:30) (96% - 100%)    PHYSICAL EXAM:  Comfortable  Cardiovascular: Normal S1 S2, No rub  Respiratory: Clear; diminished BS at bases	  Neuro: A & O x 3; non focal  Gastrointestinal:  Soft, Non-tender, + BS	  Extremities: trace LE edema    LABS:                        9.4    3.8   )-----------( 130      ( 13 Feb 2019 05:32 )             28.6     02-14    139  |  100  |  54.0<H>  ----------------------------<  147<H>  4.0   |  32.0<H>  |  1.23    Ca    8.1<L>      14 Feb 2019 07:13  Mg     2.5     02-14          Magnesium, Serum: 2.5 mg/dL (02-14 @ 07:13)  Magnesium, Serum: 2.5 mg/dL (02-13 @ 20:16)      RADIOLOGY & ADDITIONAL TESTS:

## 2019-02-14 NOTE — PROGRESS NOTE ADULT - PROBLEM SELECTOR PLAN 1
5kg Weight gain overnight, chest xray worse.   Will switch from lasix BID IVP to Lasix infusion 5mg/hr  Will insert fontenot for critically ill monitoring.   check bmp/mag q12 hours while on lasix infusion.  Plan discussed with Almita JONES/Eyal Birch MD to call ICU consult. 5kg Weight gain overnight, chest xray worse.   Will switch from lasix BID IVP to Lasix infusion 5mg/hr  Will insert fontenot for critically ill monitoring.   check bmp/mag q12 hours while on lasix infusion.  Plan discussed with Almita JONES/Eyal Birch MD to call ICU/Pulmonary consult.   TTE in AM

## 2019-02-14 NOTE — CONSULT NOTE ADULT - PROBLEM SELECTOR RECOMMENDATION 9
Related to KYREE/OHV which is untreated and now exacerbated by the narcotics.  Adjusted to AVAPS and would contineu with nocturnal support.  F/U ABG  Avoid narcotic and sedating agents  Would also optimize body positioning and mobilize OOB as much as possible or Bed in Chair position to alleviate pressure on her diaphragm

## 2019-02-14 NOTE — CONSULT NOTE ADULT - SUBJECTIVE AND OBJECTIVE BOX
PULMONARY CONSULT NOTE      RANJIT ROCHA-[id number]    Patient is a 63y old  Female who presents with a chief complaint of SOB (2019 16:25)  Pt is a 64 yo presenting to ED w/ SOB, PMH CAD s/p CABG and PCI, PAD s/p baloon angioplasty, DM2, HTN, HLD, hx of cardiac arrest, recent GI bleed last month, Diastolic CHF, legally blind, pleural effusion requiring thoracentesis in Dec 2018.   Patient states that she has been having increasing SOB over the last week, she lives at home with her family, denies sick contacts, but has been having more exertional dyspnea. She states she walks with a walker but it is taking less activity for her dyspnea to occur. She has been compliant with all of her medications, she does not recall the names, but states that there have been no changes to her medication regimen since her discharge. She denies any associated chest pain or pressure. She has been taking her tosemide she believes. Denies PND or orthopnea, but she has noted increased LE edema.   Denies headaches, nausea, vomiting, chest pain, palpitations, abdominal pain, constipation, diarrhea, melena, hematochezia, dysuria.       HISTORY OF PRESENT ILLNESS:    MEDICATIONS  (STANDING):  ALBUTerol    0.083% 10 milliGRAM(s) Nebulizer once  aspirin  chewable 81 milliGRAM(s) Oral daily  atorvastatin 80 milliGRAM(s) Oral at bedtime  carvedilol 25 milliGRAM(s) Oral every 12 hours  dextrose 5%. 1000 milliLiter(s) (50 mL/Hr) IV Continuous <Continuous>  dextrose 50% Injectable 12.5 Gram(s) IV Push once  dextrose 50% Injectable 25 Gram(s) IV Push once  dextrose 50% Injectable 25 Gram(s) IV Push once  docusate sodium 100 milliGRAM(s) Oral two times a day  DULoxetine 60 milliGRAM(s) Oral daily  epoetin sara Injectable 18771 Unit(s) SubCutaneous <User Schedule>  ferrous sulfate Oral Tab/Cap - Peds 325 milliGRAM(s) Oral daily  furosemide Infusion 5 mG/Hr (2.5 mL/Hr) IV Continuous <Continuous>  gabapentin 100 milliGRAM(s) Oral three times a day  insulin lispro (HumaLOG) corrective regimen sliding scale   SubCutaneous three times a day before meals  iron sucrose IVPB 200 milliGRAM(s) IV Intermittent every 24 hours  isosorbide   mononitrate ER Tablet (IMDUR) 60 milliGRAM(s) Oral daily  magnesium oxide 400 milliGRAM(s) Oral daily  pantoprazole    Tablet 40 milliGRAM(s) Oral before breakfast      MEDICATIONS  (PRN):  acetaminophen   Tablet .. 650 milliGRAM(s) Oral every 6 hours PRN Mild Pain (1 - 3)  dextrose 40% Gel 15 Gram(s) Oral once PRN Blood Glucose LESS THAN 70 milliGRAM(s)/deciliter  glucagon  Injectable 1 milliGRAM(s) IntraMuscular once PRN Glucose LESS THAN 70 milligrams/deciliter      Allergies    Accupril (Other)    Intolerances        PAST MEDICAL & SURGICAL HISTORY:  Herniated disc, cervical  PAD (peripheral artery disease)  Legally blind  History of peripheral vascular disease  History of retinal detachment  History of CEA (carotid endarterectomy): Right  Hyperlipidemia  Glaucoma  Hypertension  Diabetes  S/P CABG x 1  After cataract  Uveitic glaucoma of both eyes  Detached retina  Atherosclerosis of right carotid artery   delivery delivered      FAMILY HISTORY:      SOCIAL HISTORY  Smoking History:     REVIEW OF SYSTEMS:    CONSTITUTIONAL:  No fevers, chills, sweats    HEENT:  Eyes:  No diplopia or blurred vision. ENT:  No earache, sore throat or runny nose.    CARDIOVASCULAR:  No pressure, squeezing, tightness, or heaviness about the chest; no palpitations.    RESPIRATORY:  No cough, shortness of breath, PND or orthopnea. Mild SOBOE    GASTROINTESTINAL:  No abdominal pain, nausea, vomiting or diarrhea.    GENITOURINARY:  No dysuria, frequency or urgency.    NEUROLOGIC:  No paresthesias, fasciculations, seizures or weakness.    PSYCHIATRIC:  No disorder of thought or mood.    Vital Signs Last 24 Hrs  T(C): 36.3 (2019 10:30), Max: 36.4 (2019 22:07)  T(F): 97.4 (2019 10:30), Max: 97.6 (2019 22:07)  HR: 58 (2019 16:46) (55 - 58)  BP: 124/70 (2019 15:50) (124/70 - 150/68)  BP(mean): --  RR: 18 (2019 15:50) (16 - 18)  SpO2: 100% (2019 16:46) (94% - 100%)    PHYSICAL EXAMINATION:    GENERAL: The patient is a well-developed, well-nourished _____in no apparent distress.     HEENT: Head is normocephalic and atraumatic. Extraocular muscles are intact. Mucous membranes are moist.     NECK: Supple.     LUNGS: Clear to auscultation without wheezing, rales, or rhonchi. Respirations unlabored    HEART: Regular rate and rhythm without murmur.    ABDOMEN: Soft, nontender, and nondistended.  No hepatosplenomegaly is noted.    EXTREMITIES: Without any cyanosis, clubbing, rash, lesions or edema.    NEUROLOGIC: Grossly intact.      LABS:                        9.4    3.8   )-----------( 130      ( 2019 05:32 )             28.6         139  |  100  |  54.0<H>  ----------------------------<  147<H>  4.0   |  32.0<H>  |  1.23    Ca    8.1<L>      2019 07:13  Mg     2.5               ABG - ( 2019 14:04 )  pH, Arterial: 7.27  pH, Blood: x     /  pCO2: 73    /  pO2: 100   / HCO3: 29    / Base Excess: 5.2   /  SaO2: 98                      Serum Pro-Brain Natriuretic Peptide: 5867 pg/mL (19 @ 05:32)          MICROBIOLOGY:    RADIOLOGY & ADDITIONAL STUDIES: PULMONARY CONSULT NOTE      RANJIT ROCHA-[id number]    Patient is a 63y old  Female who presents with a chief complaint of SOB (2019 16:25)  Pt is a 62 yo presenting to ED w/ SOB, PMH CAD s/p CABG and PCI, PAD s/p baloon angioplasty, DM2, HTN, HLD, hx of cardiac arrest, recent GI bleed last month, Diastolic CHF, legally blind, pleural effusion requiring thoracentesis in Dec 2018.   Patient states that she has been having increasing SOB over the last week, she lives at home with her family, denies sick contacts, but has been having more exertional dyspnea. She states she walks with a walker but it is taking less activity for her dyspnea to occur. She has been compliant with all of her medications, she does not recall the names, but states that there have been no changes to her medication regimen since her discharge. She denies any associated chest pain or pressure. She has been taking her tosemide she believes. Denies PND or orthopnea, but she has noted increased LE edema.   Denies headaches, nausea, vomiting, chest pain, palpitations, abdominal pain, constipation, diarrhea, melena, hematochezia, dysuria.       HISTORY OF PRESENT ILLNESS:  alert and responsive on bipap/avaps  denies chest pain or sputum  MEDICATIONS  (STANDING):  ALBUTerol    0.083% 10 milliGRAM(s) Nebulizer once  aspirin  chewable 81 milliGRAM(s) Oral daily  atorvastatin 80 milliGRAM(s) Oral at bedtime  carvedilol 25 milliGRAM(s) Oral every 12 hours  dextrose 5%. 1000 milliLiter(s) (50 mL/Hr) IV Continuous <Continuous>  dextrose 50% Injectable 12.5 Gram(s) IV Push once  dextrose 50% Injectable 25 Gram(s) IV Push once  dextrose 50% Injectable 25 Gram(s) IV Push once  docusate sodium 100 milliGRAM(s) Oral two times a day  DULoxetine 60 milliGRAM(s) Oral daily  epoetin sara Injectable 27668 Unit(s) SubCutaneous <User Schedule>  ferrous sulfate Oral Tab/Cap - Peds 325 milliGRAM(s) Oral daily  furosemide Infusion 5 mG/Hr (2.5 mL/Hr) IV Continuous <Continuous>  gabapentin 100 milliGRAM(s) Oral three times a day  insulin lispro (HumaLOG) corrective regimen sliding scale   SubCutaneous three times a day before meals  iron sucrose IVPB 200 milliGRAM(s) IV Intermittent every 24 hours  isosorbide   mononitrate ER Tablet (IMDUR) 60 milliGRAM(s) Oral daily  magnesium oxide 400 milliGRAM(s) Oral daily  pantoprazole    Tablet 40 milliGRAM(s) Oral before breakfast      MEDICATIONS  (PRN):  acetaminophen   Tablet .. 650 milliGRAM(s) Oral every 6 hours PRN Mild Pain (1 - 3)  dextrose 40% Gel 15 Gram(s) Oral once PRN Blood Glucose LESS THAN 70 milliGRAM(s)/deciliter  glucagon  Injectable 1 milliGRAM(s) IntraMuscular once PRN Glucose LESS THAN 70 milligrams/deciliter      Allergies    Accupril (Other)    Intolerances        PAST MEDICAL & SURGICAL HISTORY:  Herniated disc, cervical  PAD (peripheral artery disease)  Legally blind  History of peripheral vascular disease  History of retinal detachment  History of CEA (carotid endarterectomy): Right  Hyperlipidemia  Glaucoma  Hypertension  Diabetes  S/P CABG x 1  After cataract  Uveitic glaucoma of both eyes  Detached retina  Atherosclerosis of right carotid artery   delivery delivered      FAMILY HISTORY:      SOCIAL HISTORY  Smoking History:     REVIEW OF SYSTEMS:    CONSTITUTIONAL:  No fevers, chills, sweats    HEENT:  Eyes:  No diplopia or blurred vision. ENT:  No earache, sore throat or runny nose.    CARDIOVASCULAR:  No pressure, squeezing, tightness, or heaviness about the chest; no palpitations.    RESPIRATORY:  see HPI    GASTROINTESTINAL:  No abdominal pain, nausea, vomiting or diarrhea.    GENITOURINARY:  No dysuria, frequency or urgency.    NEUROLOGIC:  No paresthesias, fasciculations, seizures or weakness.    PSYCHIATRIC:  No disorder of thought or mood.    Vital Signs Last 24 Hrs  T(C): 36.3 (2019 10:30), Max: 36.4 (2019 22:07)  T(F): 97.4 (2019 10:30), Max: 97.6 (2019 22:07)  HR: 58 (2019 16:46) (55 - 58)  BP: 124/70 (2019 15:50) (124/70 - 150/68)  BP(mean): --  RR: 18 (2019 15:50) (16 - 18)  SpO2: 100% (2019 16:46) (94% - 100%)    PHYSICAL EXAMINATION:    GENERAL: The patient is a well-developed, well-nourished _in no apparent distress on AVAPS    HEENT: Head is normocephalic and atraumatic. Extraocular muscles are intact. Mucous membranes are moist.     NECK: Supple.     LUNGS: diminished air entry bilat without wheezing, rales, or rhonchi. Respirations unlabored    HEART: Regular rate and rhythm without murmur.    ABDOMEN: Soft, nontender, and nondistended.  No hepatosplenomegaly is noted.    EXTREMITIES: With  edema.    NEUROLOGIC: Grossly intact.      LABS:                        9.4    3.8   )-----------( 130      ( 2019 05:32 )             28.6     02-    139  |  100  |  54.0<H>  ----------------------------<  147<H>  4.0   |  32.0<H>  |  1.23    Ca    8.1<L>      2019 07:13  Mg     2.5     02-14          ABG - ( 2019 14:04 )  pH, Arterial: 7.27  pH, Blood: x     /  pCO2: 73    /  pO2: 100   / HCO3: 29    / Base Excess: 5.2   /  SaO2: 98                      Serum Pro-Brain Natriuretic Peptide: 5867 pg/mL (19 @ 05:32)          MICROBIOLOGY:    RADIOLOGY & ADDITIONAL STUDIES:  cxrs and previous R heart cath, echo reviewed

## 2019-02-14 NOTE — CONSULT NOTE ADULT - ASSESSMENT
acute on chronic hypercapnic vent failure  severe diastolic dysfunction /CHFpEF /valvular heart disease severe MR  component of OHS/KYREE clinically  no fever or leucocytosis, denies sputum or chest pain  more alert on AVAPS, continue NIV, repeat ABG, IV lasix  critical ICU has been called  will need nocturnal NIV support as out side  repeat echocardiogram evaluate MR

## 2019-02-14 NOTE — PROGRESS NOTE ADULT - SUBJECTIVE AND OBJECTIVE BOX
Heart failure    HPI:  Pt is a 62 yo presenting to ED w/ SOB, PMH CAD s/p CABG and PCI, PAD s/p baloon angioplasty, DM2, HTN, HLD, hx of cardiac arrest, recent GI bleed last month, Diastolic CHF, legally blind, pleural effusion requiring thoracentesis in Dec 2018.   Patient states that she has been having increasing SOB over the last week, she lives at home with her family, denies sick contacts, but has been having more exertional dyspnea. She states she walks with a walker but it is taking less activity for her dyspnea to occur. She has been compliant with all of her medications, she does not recall the names, but states that there have been no changes to her medication regimen since her discharge. She denies any associated chest pain or pressure. She has been taking her tosemide she believes. Denies PND or orthopnea, but she has noted increased LE edema.   Denies headaches, nausea, vomiting, chest pain, palpitations, abdominal pain, constipation, diarrhea, melena, hematochezia, dysuria.   Her SOB has since resolved. She is breathing comfortably, has no complaints.  In ED patient was noted to have hyperkalemia on repeat labs, troponin elevated at 0.09. Her creatinine is also elevated. She did not receive any potassium treatment initially so I ordered Calcium gluc, kayexelate, Insulin, dextrose, Albuterol. EKG without widened QRS or peaked T waves. (11 Feb 2019 03:58)    Interval History:  Patient was seen and examined at bedside around 8:15 am. She was very sleepy and lethargic this am likely secondary to Morphine 2 mg which was given around 4 am.    Denies chest pain, palpitations, shortness of breath, headache, dizziness, visual symptoms, nausea, vomiting or abdominal pain.      ROS:  As per interval history otherwise unremarkable.    PHYSICAL EXAM:  Vital Signs   T(C): 36.3 (14 Feb 2019 10:30), Max: 36.4 (13 Feb 2019 16:13)  T(F): 97.4 (14 Feb 2019 10:30), Max: 97.6 (13 Feb 2019 16:13)  HR: 58 (14 Feb 2019 14:22) (55 - 58)  BP: 150/68 (14 Feb 2019 13:25) (113/58 - 150/68)  RR: 16 (14 Feb 2019 13:25) (16 - 18)  SpO2: 100% (14 Feb 2019 14:22) (94% - 100%)  General: Well developed. Well nourished. No acute distress  HEENT: Opaque right cornea. EOMI. Clear conjunctivae. Moist mucus membrane  Neck: Supple. No JVD.   Chest: CTA bilaterally - no wheezing, rales or rhonchi.   Heart: Normal S1 & S2. RRR. No murmur.   Abdomen: Soft. Non-tender. Non-distended. + BS. Abdominal wall edema.   Ext: 2+ pedal edema. No calf tenderness. 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    Neuro: Lethargic but AAO x 3. No focal deficit. No speech disorder  Skin: Warm and Dry  Psychiatry: Normal mood and affect    MEDICATIONS  (STANDING):  ALBUTerol    0.083% 10 milliGRAM(s) Nebulizer once  aspirin  chewable 81 milliGRAM(s) Oral daily  atorvastatin 80 milliGRAM(s) Oral at bedtime  carvedilol 25 milliGRAM(s) Oral every 12 hours  dextrose 5%. 1000 milliLiter(s) (50 mL/Hr) IV Continuous <Continuous>  dextrose 50% Injectable 12.5 Gram(s) IV Push once  dextrose 50% Injectable 25 Gram(s) IV Push once  dextrose 50% Injectable 25 Gram(s) IV Push once  docusate sodium 100 milliGRAM(s) Oral two times a day  DULoxetine 60 milliGRAM(s) Oral daily  epoetin sara Injectable 82013 Unit(s) SubCutaneous <User Schedule>  ferrous sulfate Oral Tab/Cap - Peds 325 milliGRAM(s) Oral daily  furosemide   Injectable 40 milliGRAM(s) IV Push every 12 hours  furosemide   Injectable 40 milliGRAM(s) IV Push once  gabapentin 100 milliGRAM(s) Oral three times a day  insulin lispro (HumaLOG) corrective regimen sliding scale   SubCutaneous three times a day before meals  iron sucrose IVPB 200 milliGRAM(s) IV Intermittent every 24 hours  isosorbide   mononitrate ER Tablet (IMDUR) 60 milliGRAM(s) Oral daily  magnesium oxide 400 milliGRAM(s) Oral daily  pantoprazole    Tablet 40 milliGRAM(s) Oral before breakfast    MEDICATIONS  (PRN):  acetaminophen   Tablet .. 650 milliGRAM(s) Oral every 6 hours PRN Mild Pain (1 - 3)  dextrose 40% Gel 15 Gram(s) Oral once PRN Blood Glucose LESS THAN 70 milliGRAM(s)/deciliter  glucagon  Injectable 1 milliGRAM(s) IntraMuscular once PRN Glucose LESS THAN 70 milligrams/deciliter      LABS:  CAPILLARY BLOOD GLUCOSE  POCT Blood Glucose.: 167 mg/dL (14 Feb 2019 12:21)  POCT Blood Glucose.: 167 mg/dL (14 Feb 2019 08:19)  POCT Blood Glucose.: 203 mg/dL (13 Feb 2019 21:36)  POCT Blood Glucose.: 153 mg/dL (13 Feb 2019 17:35)                        9.4    3.8   )-----------( 130      ( 13 Feb 2019 05:32 )             28.6     02-14    139  |  100  |  54.0<H>  ----------------------------<  147<H>  4.0   |  32.0<H>  |  1.23    Ca    8.1<L>      14 Feb 2019 07:13  Mg     2.5     02-14      RADIOLOGY & ADDITIONAL STUDIES:  Reviewed

## 2019-02-14 NOTE — PROGRESS NOTE ADULT - PROBLEM SELECTOR PLAN 2
Pt with increased lethargy  acute hypercapnic resp failure 7.27/73/100/29  Patient placed on bipap 10/5 40%  repeat ABG at 17:30

## 2019-02-14 NOTE — CONSULT NOTE ADULT - ASSESSMENT
63F with CHF, CRF, KYREE (Non compliant with NIPPV). Developed lethargy today after receiving IV Morphine and not having NIPPV.  Initial setting the BIPAP was set on, were inadequate for her and her body habitus.    At this point, the patient does not require admission to [facility] ICU. If she deteriorates, we can re-evaluate her at that time.    I adjusted the Bipap to AVAPS, which will provide better minute ventilation and recommend that she continue with the AVAPS for a few more hours and then be on in nocturnally as well.

## 2019-02-14 NOTE — CONSULT NOTE ADULT - SUBJECTIVE AND OBJECTIVE BOX
Patient is a 63y old  Female who presents with a chief complaint of SOB (2019 15:11)      BRIEF HOSPITAL COURSE: 63F with an extensive medical history including Diastolic CHF, CRF, DM, PVD, Blindness, KYREE, (who is supposed to be on a Trilogy Vent Nocturnally) Admited with CHF. Now asked to evaluate the patient after being found lethargic. Her ABG was 7.27/73/100/29. She was placed on Bipap by the hospitalist. Of NOte the patient has not been wearing Nocturnal NIPPV and she was given IV Morphine this morning at 0400.    On mu arrival, the patient is breathing comfortably in bed, (noted to be laying flat). There is no distress. She is arousable to her name and oriented x 3. She follows commands. Her HR/BP are normal.      PAST MEDICAL & SURGICAL HISTORY:  Herniated disc, cervical  PAD (peripheral artery disease)  Legally blind  History of peripheral vascular disease  History of retinal detachment  History of CEA (carotid endarterectomy): Right  Hyperlipidemia  Glaucoma  Hypertension  Diabetes  S/P CABG x 1  After cataract  Uveitic glaucoma of both eyes  Detached retina  Atherosclerosis of right carotid artery   delivery delivered    Allergies    Accupril (Other)    Intolerances      FAMILY HISTORY:      Family history otherwise noncontributory.    Social History:  NON-smoker, No ETOH   Review of Systems:    +Dyspnea with exertion,  + Lower back pain  + Lower extremity claudication        Medications:    carvedilol 25 milliGRAM(s) Oral every 12 hours  furosemide Infusion 5 mG/Hr IV Continuous <Continuous>  isosorbide   mononitrate ER Tablet (IMDUR) 60 milliGRAM(s) Oral daily    ALBUTerol    0.083% 10 milliGRAM(s) Nebulizer once    acetaminophen   Tablet .. 650 milliGRAM(s) Oral every 6 hours PRN  DULoxetine 60 milliGRAM(s) Oral daily  gabapentin 100 milliGRAM(s) Oral three times a day      aspirin  chewable 81 milliGRAM(s) Oral daily    docusate sodium 100 milliGRAM(s) Oral two times a day  pantoprazole    Tablet 40 milliGRAM(s) Oral before breakfast      atorvastatin 80 milliGRAM(s) Oral at bedtime  dextrose 40% Gel 15 Gram(s) Oral once PRN  dextrose 50% Injectable 12.5 Gram(s) IV Push once  dextrose 50% Injectable 25 Gram(s) IV Push once  dextrose 50% Injectable 25 Gram(s) IV Push once  glucagon  Injectable 1 milliGRAM(s) IntraMuscular once PRN  insulin lispro (HumaLOG) corrective regimen sliding scale   SubCutaneous three times a day before meals    dextrose 5%. 1000 milliLiter(s) IV Continuous <Continuous>  ferrous sulfate Oral Tab/Cap - Peds 325 milliGRAM(s) Oral daily  iron sucrose IVPB 200 milliGRAM(s) IV Intermittent every 24 hours  magnesium oxide 400 milliGRAM(s) Oral daily    epoetin sara Injectable 82205 Unit(s) SubCutaneous <User Schedule>              ICU Vital Signs Last 24 Hrs  T(C): 36.3 (2019 10:30), Max: 36.4 (2019 22:07)  T(F): 97.4 (2019 10:30), Max: 97.6 (2019 22:07)  HR: 57 (2019 15:50) (55 - 58)  BP: 124/70 (2019 15:50) (124/70 - 150/68)  BP(mean): --  ABP: --  ABP(mean): --  RR: 18 (2019 15:50) (16 - 18)  SpO2: 100% (2019 15:50) (94% - 100%)    Vital Signs Last 24 Hrs  T(C): 36.3 (2019 10:30), Max: 36.4 (2019 22:07)  T(F): 97.4 (2019 10:30), Max: 97.6 (2019 22:07)  HR: 57 (2019 15:50) (55 - 58)  BP: 124/70 (2019 15:50) (124/70 - 150/68)  BP(mean): --  RR: 18 (2019 15:50) (16 - 18)  SpO2: 100% (2019 15:50) (94% - 100%)    ABG - ( 2019 14:04 )  pH, Arterial: 7.27  pH, Blood: x     /  pCO2: 73    /  pO2: 100   / HCO3: 29    / Base Excess: 5.2   /  SaO2: 98                  I&O's Detail    2019 07:01  -  2019 07:00  --------------------------------------------------------  IN:    Oral Fluid: 570 mL  Total IN: 570 mL    OUT:    Voided: 500 mL  Total OUT: 500 mL    Total NET: 70 mL      2019 07:01  -  2019 16:25  --------------------------------------------------------  IN:    Oral Fluid: 240 mL  Total IN: 240 mL    OUT:  Total OUT: 0 mL    Total NET: 240 mL            LABS:                        9.4    3.8   )-----------( 130      ( 2019 05:32 )             28.6     02-14    139  |  100  |  54.0<H>  ----------------------------<  147<H>  4.0   |  32.0<H>  |  1.23    Ca    8.1<L>      2019 07:13  Mg     2.5     -            CAPILLARY BLOOD GLUCOSE      POCT Blood Glucose.: 167 mg/dL (2019 12:21)        CULTURES:      Physical Examination:    GENERAL: No acute distress.  Sleepy, but easily awakens to follow commands. Comfortably breathing     EYES: Pupilst.Symmetric, Opacified ( Blindness)    EARS, NOSE, THROAT: Normal; supple neck, no lymphadenopathy; trachea midline No JVD    PULM: Decreased BS at bases, + Basilar rales, No wheezes     CVS: Regular rate and rhythm, no murmurs, rubs, or gallops    GI: + Obese Soft, nondistended, nontender, normoactive bowel sounds, no masses, no guarding    EXTREMITIES: 1+ edema B/L Lower Extremities. + Multiple Toe amputations     SKIN: Warm and well perfused, no rashes noted + CHronic Vascular skin changes noted. .    NEURO: Alert, oriented, interactive, nonfocal    DEVICES: Bipap    RADIOLOGY: CXR: IMPRESSION: CHF again noted, possibly somewhat increased from February 10.

## 2019-02-14 NOTE — PROGRESS NOTE ADULT - ASSESSMENT
ARF: decompensated CHF (R > L)  - cont IV Lasix for now  - daily weights and adjust dose accordingly==> will need to tolerate some degree of azotemia  - monitor labs  - await urine prot:creat    Anemia:   - cont IV Fe  - added QUENTIN  - trend H/H; target Hgb=10.0

## 2019-02-15 DIAGNOSIS — R07.9 CHEST PAIN, UNSPECIFIED: ICD-10-CM

## 2019-02-15 DIAGNOSIS — Z29.9 ENCOUNTER FOR PROPHYLACTIC MEASURES, UNSPECIFIED: ICD-10-CM

## 2019-02-15 LAB
ANION GAP SERPL CALC-SCNC: 10 MMOL/L — SIGNIFICANT CHANGE UP (ref 5–17)
BASE EXCESS BLDA CALC-SCNC: 11.6 MMOL/L — HIGH (ref -2–2)
BASE EXCESS BLDA CALC-SCNC: 8.7 MMOL/L — HIGH (ref -2–2)
BASOPHILS # BLD AUTO: 0 K/UL — SIGNIFICANT CHANGE UP (ref 0–0.2)
BASOPHILS NFR BLD AUTO: 0.5 % — SIGNIFICANT CHANGE UP (ref 0–2)
BLOOD GAS COMMENTS ARTERIAL: SIGNIFICANT CHANGE UP
BLOOD GAS COMMENTS ARTERIAL: SIGNIFICANT CHANGE UP
BUN SERPL-MCNC: 51 MG/DL — HIGH (ref 8–20)
CALCIUM SERPL-MCNC: 8.2 MG/DL — LOW (ref 8.6–10.2)
CHLORIDE SERPL-SCNC: 101 MMOL/L — SIGNIFICANT CHANGE UP (ref 98–107)
CO2 SERPL-SCNC: 30 MMOL/L — HIGH (ref 22–29)
CREAT ?TM UR-MCNC: 37 MG/DL — SIGNIFICANT CHANGE UP
CREAT SERPL-MCNC: 1.01 MG/DL — SIGNIFICANT CHANGE UP (ref 0.5–1.3)
EOSINOPHIL # BLD AUTO: 0.1 K/UL — SIGNIFICANT CHANGE UP (ref 0–0.5)
EOSINOPHIL NFR BLD AUTO: 1.6 % — SIGNIFICANT CHANGE UP (ref 0–6)
GAS PNL BLDA: SIGNIFICANT CHANGE UP
GAS PNL BLDA: SIGNIFICANT CHANGE UP
GLUCOSE BLDC GLUCOMTR-MCNC: 124 MG/DL — HIGH (ref 70–99)
GLUCOSE BLDC GLUCOMTR-MCNC: 126 MG/DL — HIGH (ref 70–99)
GLUCOSE BLDC GLUCOMTR-MCNC: 131 MG/DL — HIGH (ref 70–99)
GLUCOSE BLDC GLUCOMTR-MCNC: 146 MG/DL — HIGH (ref 70–99)
GLUCOSE SERPL-MCNC: 132 MG/DL — HIGH (ref 70–115)
HCO3 BLDA-SCNC: 32 MMOL/L — HIGH (ref 20–26)
HCO3 BLDA-SCNC: 35 MMOL/L — HIGH (ref 20–26)
HCT VFR BLD CALC: 31.4 % — LOW (ref 37–47)
HGB BLD-MCNC: 10 G/DL — LOW (ref 12–16)
HOROWITZ INDEX BLDA+IHG-RTO: 0.4 — SIGNIFICANT CHANGE UP
HOROWITZ INDEX BLDA+IHG-RTO: 4 — SIGNIFICANT CHANGE UP
LYMPHOCYTES # BLD AUTO: 0.7 K/UL — LOW (ref 1–4.8)
LYMPHOCYTES # BLD AUTO: 16.4 % — LOW (ref 20–55)
MAGNESIUM SERPL-MCNC: 2.4 MG/DL — SIGNIFICANT CHANGE UP (ref 1.6–2.6)
MCHC RBC-ENTMCNC: 29.9 PG — SIGNIFICANT CHANGE UP (ref 27–31)
MCHC RBC-ENTMCNC: 31.8 G/DL — LOW (ref 32–36)
MCV RBC AUTO: 94 FL — SIGNIFICANT CHANGE UP (ref 81–99)
MONOCYTES # BLD AUTO: 0.5 K/UL — SIGNIFICANT CHANGE UP (ref 0–0.8)
MONOCYTES NFR BLD AUTO: 10.7 % — HIGH (ref 3–10)
NEUTROPHILS # BLD AUTO: 3.1 K/UL — SIGNIFICANT CHANGE UP (ref 1.8–8)
NEUTROPHILS NFR BLD AUTO: 70.3 % — SIGNIFICANT CHANGE UP (ref 37–73)
PCO2 BLDA: 50 MMHG — HIGH (ref 35–45)
PCO2 BLDA: 66 MMHG — HIGH (ref 35–45)
PH BLDA: 7.34 — LOW (ref 7.35–7.45)
PH BLDA: 7.47 — HIGH (ref 7.35–7.45)
PLATELET # BLD AUTO: 141 K/UL — LOW (ref 150–400)
PO2 BLDA: 142 MMHG — HIGH (ref 83–108)
PO2 BLDA: 77 MMHG — LOW (ref 83–108)
POTASSIUM SERPL-MCNC: 4.4 MMOL/L — SIGNIFICANT CHANGE UP (ref 3.5–5.3)
POTASSIUM SERPL-SCNC: 4.4 MMOL/L — SIGNIFICANT CHANGE UP (ref 3.5–5.3)
PROT ?TM UR-MCNC: 39 MG/DL — HIGH (ref 0–12)
PROT/CREAT UR-RTO: 1.1 RATIO — SIGNIFICANT CHANGE UP
RBC # BLD: 3.34 M/UL — LOW (ref 4.4–5.2)
RBC # FLD: 16.5 % — HIGH (ref 11–15.6)
SAO2 % BLDA: 100 % — HIGH (ref 95–99)
SAO2 % BLDA: 96 % — SIGNIFICANT CHANGE UP (ref 95–99)
SODIUM SERPL-SCNC: 141 MMOL/L — SIGNIFICANT CHANGE UP (ref 135–145)
WBC # BLD: 4.4 K/UL — LOW (ref 4.8–10.8)
WBC # FLD AUTO: 4.4 K/UL — LOW (ref 4.8–10.8)

## 2019-02-15 PROCEDURE — 99232 SBSQ HOSP IP/OBS MODERATE 35: CPT

## 2019-02-15 PROCEDURE — 99291 CRITICAL CARE FIRST HOUR: CPT

## 2019-02-15 PROCEDURE — 99233 SBSQ HOSP IP/OBS HIGH 50: CPT

## 2019-02-15 PROCEDURE — 93010 ELECTROCARDIOGRAM REPORT: CPT

## 2019-02-15 RX ORDER — ACETAMINOPHEN 500 MG
1000 TABLET ORAL ONCE
Qty: 0 | Refills: 0 | Status: DISCONTINUED | OUTPATIENT
Start: 2019-02-15 | End: 2019-02-20

## 2019-02-15 RX ORDER — PANTOPRAZOLE SODIUM 20 MG/1
40 TABLET, DELAYED RELEASE ORAL ONCE
Qty: 0 | Refills: 0 | Status: COMPLETED | OUTPATIENT
Start: 2019-02-15 | End: 2019-02-15

## 2019-02-15 RX ORDER — HEPARIN SODIUM 5000 [USP'U]/ML
5000 INJECTION INTRAVENOUS; SUBCUTANEOUS EVERY 8 HOURS
Qty: 0 | Refills: 0 | Status: DISCONTINUED | OUTPATIENT
Start: 2019-02-15 | End: 2019-02-20

## 2019-02-15 RX ADMIN — Medication 100 MILLIGRAM(S): at 17:16

## 2019-02-15 RX ADMIN — ISOSORBIDE MONONITRATE 60 MILLIGRAM(S): 60 TABLET, EXTENDED RELEASE ORAL at 17:15

## 2019-02-15 RX ADMIN — ATORVASTATIN CALCIUM 80 MILLIGRAM(S): 80 TABLET, FILM COATED ORAL at 23:19

## 2019-02-15 RX ADMIN — CARVEDILOL PHOSPHATE 25 MILLIGRAM(S): 80 CAPSULE, EXTENDED RELEASE ORAL at 17:15

## 2019-02-15 RX ADMIN — Medication 325 MILLIGRAM(S): at 17:16

## 2019-02-15 RX ADMIN — Medication 650 MILLIGRAM(S): at 23:21

## 2019-02-15 RX ADMIN — HEPARIN SODIUM 5000 UNIT(S): 5000 INJECTION INTRAVENOUS; SUBCUTANEOUS at 10:40

## 2019-02-15 RX ADMIN — Medication 650 MILLIGRAM(S): at 23:45

## 2019-02-15 RX ADMIN — HEPARIN SODIUM 5000 UNIT(S): 5000 INJECTION INTRAVENOUS; SUBCUTANEOUS at 23:19

## 2019-02-15 RX ADMIN — IRON SUCROSE 110 MILLIGRAM(S): 20 INJECTION, SOLUTION INTRAVENOUS at 23:20

## 2019-02-15 RX ADMIN — DULOXETINE HYDROCHLORIDE 60 MILLIGRAM(S): 30 CAPSULE, DELAYED RELEASE ORAL at 17:15

## 2019-02-15 RX ADMIN — MAGNESIUM OXIDE 400 MG ORAL TABLET 400 MILLIGRAM(S): 241.3 TABLET ORAL at 17:16

## 2019-02-15 RX ADMIN — Medication 2.5 MG/HR: at 12:08

## 2019-02-15 RX ADMIN — GABAPENTIN 100 MILLIGRAM(S): 400 CAPSULE ORAL at 23:18

## 2019-02-15 RX ADMIN — GABAPENTIN 100 MILLIGRAM(S): 400 CAPSULE ORAL at 17:16

## 2019-02-15 RX ADMIN — Medication 81 MILLIGRAM(S): at 17:15

## 2019-02-15 RX ADMIN — PANTOPRAZOLE SODIUM 40 MILLIGRAM(S): 20 TABLET, DELAYED RELEASE ORAL at 12:08

## 2019-02-15 NOTE — PROGRESS NOTE ADULT - ASSESSMENT
Pt is a 62 yo presenting to ED w/ SOB, PMH CAD s/p CABG and PCI, PAD s/p baloon angioplasty, DM2, HTN, HLD, hx of cardiac arrest, recent GI bleed last month, Diastolic CHF, legally blind, pleural effusion requiring thoracentesis in Dec 2018. Pt being followed by Cardiology this admission for SOB. Pt with CHF exacerbation receiving IV furosemide 40mg BID. Yesterday 19 Patient evaluated at bedside for worsening lethargy, AB.27/73/100/29, Bipap ordered 10/5 40%, Chest Xray ordered showing worsening pulmonary edema and physical exam was notable for bilateral crackles - Patient with 5kg weight gain overnight. Patient clinically is improving with resolved lethargy, improved lung sounds, ABG improving, urinary output measured via fontenot catheter.

## 2019-02-15 NOTE — PROGRESS NOTE ADULT - ASSESSMENT
Echo with mod to severe MR, mild pulm HTN  Acute on chronic hypercapnic respiratory failure  Decreased mental status may be due hypercarbia as she appears to improve on AVAPS  Likely KYREE/OSH  CAD s/p CABG and PCI  PAD s/p baloon angioplasty  H/o cardiac arrest  CHFpEF  Diastolic dysfunction  Prior pleural effusion s/p drainage  Legally blind    Plan:    Continue AVAPS nocturnal and prn though appears to require continually  Consider tx to SDU or ICU if unable to take patient off of AVAPS during the day  Likely will require chronic NIV  Diurese as tolerates  Optimize cardiac function  Follow ABG as needed though last ABG from this AM revealed improved, compensated hypercarbia  On heparin for DVT prophylaxis  On PPI for GI prophylaxis  Albuterol as needed    > 35 min spent in the evaluation and care of this critically ill patient   D/w cardiology NP Echo with mod to severe MR, mild pulm HTN  Acute on chronic hypercapnic respiratory failure  Decreased mental status may be due hypercarbia as she appears to improve on AVAPS  Likely KYREE/OSH  CAD s/p CABG and PCI  PAD s/p baloon angioplasty  H/o cardiac arrest  CHFpEF  Diastolic dysfunction  Prior pleural effusion s/p drainage  Legally blind    Plan:    Continue AVAPS nocturnal and prn though appears to require continually  Consider tx to SDU or ICU if unable to take patient off of AVAPS during the day  Likely will require chronic NIV  Diurese as tolerates  Optimize cardiac function  Follow ABG as needed though last ABG from this AM revealed improved, compensated hypercarbia  On heparin for DVT prophylaxis  On PPI for GI prophylaxis  Albuterol as needed    > 35 min spent in the evaluation and care of this critically ill patient   D/w cardiology NP  D/w medicine

## 2019-02-15 NOTE — PROGRESS NOTE ADULT - PROBLEM SELECTOR PLAN 2
Patient with resolved lethargy, patient is easily aroused.   Blood gas improving  Pulmonary following Patient with resolved lethargy, patient is easily aroused.   Blood gas improving  Pulmonary following  Trial off bipap today

## 2019-02-15 NOTE — PROGRESS NOTE ADULT - ASSESSMENT
Pt is a 62 yo presenting to ED w/ SOB 2/2 diastolic HF exacerbation and noted to have KEITH and hyperkalemia, PMH CAD s/p CABG and PCI, PAD s/p baloon angioplasty, DM2, HTN, HLD, hx of cardiac arrest, recent GI bleed last month, Diastolic CHF, legally blind, pleural effusion requiring thoracentesis in Dec 2018.     1) Acute on Chronic Diastolic Heart Failure  - Daily weight  - Strict intake/output  - Lasix Infusion as per Cardio  - Cardiology input appreciated   - ECHO pending  2) Acute on Chronic Hypercapnic Respiratory Failure  - H/O OHS/KYREE  - Supposed to be on Trilogy at home as per Pulmonary Note from previous admission  - Continue NIV (AVAPS)  - ICU Consult appreciated  - Pulmonary Consult appreciated  - Avoid Narcotics for pain  3) Acute on chronic kidney injury  - Improving  - Avoid nephrotoxic medications  - Renal input appreciated  4) Hyperkalemia  - Improved  - Losartan on hold   5) CAD / PAD / HLD / HTN  - Continue Aspirin, Imdur, Lipitor and Coreg  - Plavix on hold since last admission due to GI Bleed  6) Anemia  - Chronic  - H&H stable  - Continue Venofer and Procrit  - Continue Ferrous Sulfate 325 mg daily  7) Transaminitis  - Likely secondary to liver congestion  - Improving  8) History of Depression  - Continue Duloxetine 60 mg     DVT Prophylaxis -- IPC Pt is a 64 yo presenting to ED w/ SOB 2/2 diastolic HF exacerbation and noted to have KEITH and hyperkalemia, PMH CAD s/p CABG and PCI, PAD s/p baloon angioplasty, DM2, HTN, HLD, hx of cardiac arrest, recent GI bleed last month, Diastolic CHF, legally blind, pleural effusion requiring thoracentesis in Dec 2018.     1) Acute on Chronic Diastolic Heart Failure  - Daily weight  - Strict intake/output  - Lasix Infusion as per Cardio  - Cardiology input appreciated   - ECHO pending  2) Acute on Chronic Hypercapnic Respiratory Failure  - H/O OHS/KYREE  - Supposed to be on Trilogy at home as per Pulmonary Note from previous admission  - Continue NIV (AVAPS)  - ICU Consult appreciated. Will reconsult as patient is requiring continuos AVAPS.  - Pulmonary Consult appreciated  - Avoid Narcotics for pain  3) Acute on chronic kidney injury  - Improving  - Avoid nephrotoxic medications  - Renal input appreciated  4) Hyperkalemia  - Improved  - Losartan on hold   5) CAD / PAD / HLD / HTN  - Continue Aspirin, Imdur, Lipitor and Coreg  - Plavix on hold since last admission due to GI Bleed  6) Anemia  - Chronic  - H&H stable  - Continue Venofer and Procrit  - Continue Ferrous Sulfate 325 mg daily  7) Transaminitis  - Likely secondary to liver congestion  - Improving  8) History of Depression  - Continue Duloxetine 60 mg     DVT Prophylaxis -- IPC and Heparin 5000 Units

## 2019-02-15 NOTE — PROGRESS NOTE ADULT - SUBJECTIVE AND OBJECTIVE BOX
Heart failure    HPI:  Pt is a 62 yo presenting to ED w/ SOB, PMH CAD s/p CABG and PCI, PAD s/p baloon angioplasty, DM2, HTN, HLD, hx of cardiac arrest, recent GI bleed last month, Diastolic CHF, legally blind, pleural effusion requiring thoracentesis in Dec 2018.   Patient states that she has been having increasing SOB over the last week, she lives at home with her family, denies sick contacts, but has been having more exertional dyspnea. She states she walks with a walker but it is taking less activity for her dyspnea to occur. She has been compliant with all of her medications, she does not recall the names, but states that there have been no changes to her medication regimen since her discharge. She denies any associated chest pain or pressure. She has been taking her tosemide she believes. Denies PND or orthopnea, but she has noted increased LE edema.   Denies headaches, nausea, vomiting, chest pain, palpitations, abdominal pain, constipation, diarrhea, melena, hematochezia, dysuria.   Her SOB has since resolved. She is breathing comfortably, has no complaints.  In ED patient was noted to have hyperkalemia on repeat labs, troponin elevated at 0.09. Her creatinine is also elevated. She did not receive any potassium treatment initially so I ordered Calcium gluc, kayexelate, Insulin, dextrose, Albuterol. EKG without widened QRS or peaked T waves. (11 Feb 2019 03:58)    Interval History:  Patient was seen and examined at bedside around 8:30 am. Resting comfortably in bed on AVAPS.   Denies chest pain, palpitations, shortness of breath, headache, dizziness, visual symptoms, nausea, vomiting or abdominal pain.    ROS:  As per interval history otherwise unremarkable.    PHYSICAL EXAM:  Vital Signs  T(C): 36.5 (15 Feb 2019 10:01), Max: 36.6 (15 Feb 2019 06:35)  T(F): 97.7 (15 Feb 2019 10:01), Max: 97.8 (15 Feb 2019 06:35)  HR: 58 (15 Feb 2019 10:45) (52 - 66)  BP: 138/68 (15 Feb 2019 10:45) (110/60 - 171/74)  RR: 18 (15 Feb 2019 10:01) (18 - 18)  SpO2: 100% (15 Feb 2019 10:01) (99% - 100%)  General: Well developed. Well nourished. No acute distress  HEENT: Opaque right cornea. EOMI. Clear conjunctivae. Moist mucus membrane  Neck: Supple. No JVD.   Chest: Decreased air entry at bases. No wheezing, rales or rhonchi.   Heart: Normal S1 & S2. RRR. No murmur.   Abdomen: Soft. Non-tender. Non-distended. + BS. Abdominal wall edema.   Ext: 2+ pedal edema. No calf tenderness. 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    Neuro: Lethargic but AAO x 3. No focal deficit. No speech disorder  Skin: Warm and Dry  Psychiatry: Normal mood and affect    MEDICATIONS  (STANDING):  acetaminophen  IVPB .. 1000 milliGRAM(s) IV Intermittent once  ALBUTerol    0.083% 10 milliGRAM(s) Nebulizer once  aspirin  chewable 81 milliGRAM(s) Oral daily  atorvastatin 80 milliGRAM(s) Oral at bedtime  carvedilol 25 milliGRAM(s) Oral every 12 hours  dextrose 5%. 1000 milliLiter(s) (50 mL/Hr) IV Continuous <Continuous>  dextrose 50% Injectable 12.5 Gram(s) IV Push once  dextrose 50% Injectable 25 Gram(s) IV Push once  dextrose 50% Injectable 25 Gram(s) IV Push once  docusate sodium 100 milliGRAM(s) Oral two times a day  DULoxetine 60 milliGRAM(s) Oral daily  epoetin sara Injectable 82118 Unit(s) SubCutaneous <User Schedule>  ferrous sulfate Oral Tab/Cap - Peds 325 milliGRAM(s) Oral daily  furosemide Infusion 5 mG/Hr (2.5 mL/Hr) IV Continuous <Continuous>  gabapentin 100 milliGRAM(s) Oral three times a day  heparin  Injectable 5000 Unit(s) SubCutaneous every 8 hours  insulin lispro (HumaLOG) corrective regimen sliding scale   SubCutaneous three times a day before meals  iron sucrose IVPB 200 milliGRAM(s) IV Intermittent every 24 hours  isosorbide   mononitrate ER Tablet (IMDUR) 60 milliGRAM(s) Oral daily  magnesium oxide 400 milliGRAM(s) Oral daily  pantoprazole    Tablet 40 milliGRAM(s) Oral before breakfast    MEDICATIONS  (PRN):  acetaminophen   Tablet .. 650 milliGRAM(s) Oral every 6 hours PRN Mild Pain (1 - 3)  dextrose 40% Gel 15 Gram(s) Oral once PRN Blood Glucose LESS THAN 70 milliGRAM(s)/deciliter  glucagon  Injectable 1 milliGRAM(s) IntraMuscular once PRN Glucose LESS THAN 70 milligrams/deciliter      LABS:  CAPILLARY BLOOD GLUCOSE  POCT Blood Glucose.: 131 mg/dL (15 Feb 2019 12:01)  POCT Blood Glucose.: 124 mg/dL (15 Feb 2019 08:31)  POCT Blood Glucose.: 158 mg/dL (14 Feb 2019 22:29)  POCT Blood Glucose.: 172 mg/dL (14 Feb 2019 17:28)                          10.0   4.4   )-----------( 141      ( 15 Feb 2019 06:23 )             31.4     02-15    141  |  101  |  51.0<H>  ----------------------------<  132<H>  4.4   |  30.0<H>  |  1.01    Ca    8.2<L>      15 Feb 2019 06:23  Mg     2.4     02-15      RADIOLOGY & ADDITIONAL STUDIES:  Reviewed

## 2019-02-15 NOTE — PROGRESS NOTE ADULT - ASSESSMENT
KEITH: decompensated CHF (R > L)  cr better  - cont IV Lasix for now  - daily weights and adjust dose accordingly==> will need to tolerate some degree of azotemia  - monitor labs  - await urine prot: creat    Anemia:   - cont IV Fe  - added QUENTIN  - trend H/H; target Hgb=10.0    Will follow

## 2019-02-15 NOTE — PROGRESS NOTE ADULT - SUBJECTIVE AND OBJECTIVE BOX
CC:  Patient is a 63y old  Female who presents with a chief complaint of SOB (14 Feb 2019 17:21)      HPI:  Pt is a 64 yo presenting to ED w/ SOB, PMH CAD s/p CABG and PCI, PAD s/p baloon angioplasty, DM2, HTN, HLD, hx of cardiac arrest, recent GI bleed last month, Diastolic CHF, legally blind, pleural effusion requiring thoracentesis in Dec 2018.   Patient states that she has been having increasing SOB over the last week, she lives at home with her family, denies sick contacts, but has been having more exertional dyspnea. She states she walks with a walker but it is taking less activity for her dyspnea to occur. She has been compliant with all of her medications, she does not recall the names, but states that there have been no changes to her medication regimen since her discharge. She denies any associated chest pain or pressure. She has been taking her tosemide she believes. Denies PND or orthopnea, but she has noted increased LE edema.   Denies headaches, nausea, vomiting, chest pain, palpitations, abdominal pain, constipation, diarrhea, melena, hematochezia, dysuria.   Her SOB has since resolved. She is breathing comfortably, has no complaints.  In ED patient was noted to have hyperkalemia on repeat labs, troponin elevated at 0.09. Her creatinine is also elevated. She did not receive any potassium treatment initially so I ordered Calcium gluc, kayexelate, Insulin, dextrose, Albuterol. EKG without widened QRS or peaked T waves. (11 Feb 2019 03:58)      ROS: All review of systems negative unless indicated otherwise above.     Lab Results:  CBC  CBC Full  -  ( 15 Feb 2019 06:23 )  WBC Count : 4.4 K/uL  Hemoglobin : 10.0 g/dL  Hematocrit : 31.4 %  Platelet Count - Automated : 141 K/uL  Mean Cell Volume : 94.0 fl  Mean Cell Hemoglobin : 29.9 pg  Mean Cell Hemoglobin Concentration : 31.8 g/dL  Auto Neutrophil # : 3.1 K/uL  Auto Lymphocyte # : 0.7 K/uL  Auto Monocyte # : 0.5 K/uL  Auto Eosinophil # : 0.1 K/uL  Auto Basophil # : 0.0 K/uL  Auto Neutrophil % : 70.3 %  Auto Lymphocyte % : 16.4 %  Auto Monocyte % : 10.7 %  Auto Eosinophil % : 1.6 %  Auto Basophil % : 0.5 %    .		Differential:	[] Automated		[] Manual  Chemistry                        10.0   4.4   )-----------( 141      ( 15 Feb 2019 06:23 )             31.4     02-15    141  |  101  |  51.0<H>  ----------------------------<  132<H>  4.4   |  30.0<H>  |  1.01    Ca    8.2<L>      15 Feb 2019 06:23  Mg     2.4     02-15      ABG - ( 15 Feb 2019 06:54 )  pH, Arterial: 7.34  pH, Blood: x     /  pCO2: 66    /  pO2: 77    / HCO3: 32    / Base Excess: 8.7   /  SaO2: 96          RADIOLOGY RESULTS: Personally visualized and reviewed  < from: Xray Chest 1 View AP/PA. (02.14.19 @ 14:45) >  IMPRESSION: CHF again noted, possibly somewhat increased from February 10.    < end of copied text >    MEDICATIONS  (STANDING):  acetaminophen  IVPB .. 1000 milliGRAM(s) IV Intermittent once  ALBUTerol    0.083% 10 milliGRAM(s) Nebulizer once  aspirin  chewable 81 milliGRAM(s) Oral daily  atorvastatin 80 milliGRAM(s) Oral at bedtime  carvedilol 25 milliGRAM(s) Oral every 12 hours  dextrose 5%. 1000 milliLiter(s) IV Continuous <Continuous>  dextrose 50% Injectable 12.5 Gram(s) IV Push once  dextrose 50% Injectable 25 Gram(s) IV Push once  dextrose 50% Injectable 25 Gram(s) IV Push once  docusate sodium 100 milliGRAM(s) Oral two times a day  DULoxetine 60 milliGRAM(s) Oral daily  epoetin sara Injectable 59625 Unit(s) SubCutaneous <User Schedule>  ferrous sulfate Oral Tab/Cap - Peds 325 milliGRAM(s) Oral daily  furosemide Infusion 5 mG/Hr IV Continuous <Continuous>  gabapentin 100 milliGRAM(s) Oral three times a day  heparin  Injectable 5000 Unit(s) SubCutaneous every 8 hours  insulin lispro (HumaLOG) corrective regimen sliding scale   SubCutaneous three times a day before meals  iron sucrose IVPB 200 milliGRAM(s) IV Intermittent every 24 hours  isosorbide   mononitrate ER Tablet (IMDUR) 60 milliGRAM(s) Oral daily  magnesium oxide 400 milliGRAM(s) Oral daily  pantoprazole    Tablet 40 milliGRAM(s) Oral before breakfast    MEDICATIONS  (PRN):  acetaminophen   Tablet .. 650 milliGRAM(s) Oral every 6 hours PRN Mild Pain (1 - 3)  dextrose 40% Gel 15 Gram(s) Oral once PRN Blood Glucose LESS THAN 70 milliGRAM(s)/deciliter  glucagon  Injectable 1 milliGRAM(s) IntraMuscular once PRN Glucose LESS THAN 70 milligrams/deciliter      PHYSICAL EXAM:  Vital Signs Last 24 Hrs  T(C): 36.5 (15 Feb 2019 10:01), Max: 36.6 (15 Feb 2019 06:35)  T(F): 97.7 (15 Feb 2019 10:01), Max: 97.8 (15 Feb 2019 06:35)  HR: 58 (15 Feb 2019 10:45) (52 - 66)  BP: 138/68 (15 Feb 2019 10:45) (110/60 - 171/74)  BP(mean): --  RR: 18 (15 Feb 2019 10:01) (18 - 18)  SpO2: 100% (15 Feb 2019 10:01) (99% - 100%)    GENERAL: NAD, well-groomed, well-developed  HEAD:  Atraumatic, Normocephalic  NECK: Supple, No JVD  NERVOUS SYSTEM:  Alert & Oriented X3, Good and improved concentration; Motor Strength 5/5 B/L upper and lower extremities, sensation intact  CHEST/LUNG: diminished to auscultation bilaterally, bibasilar crackles improved but persistent. No rales, rhonchi, wheezing, or rubs  HEART: Regular rate and rhythm; s1 and s2 auscultated, No murmurs, rubs, or gallops  ABDOMEN: Morbidly obese, Soft, Nontender, Nondistended; Bowel sounds present and normoactive.   EXTREMITIES:  2+ Peripheral Pulses, No clubbing, cyanosis, or edema

## 2019-02-15 NOTE — PROGRESS NOTE ADULT - PROBLEM SELECTOR PLAN 1
Continue Lasix infusion 5mg/hr  Maintain fontenot for critically ill monitoring.   check bmp/mag q12 hours while on lasix infusion.  0.3kg Weight gain overnight, urine output net negative 2.5L  Pending Echo

## 2019-02-15 NOTE — PROGRESS NOTE ADULT - SUBJECTIVE AND OBJECTIVE BOX
NEPHROLOGY INTERVAL HPI/OVERNIGHT EVENTS:    Examined earlier    MEDICATIONS  (STANDING):  acetaminophen  IVPB .. 1000 milliGRAM(s) IV Intermittent once  ALBUTerol    0.083% 10 milliGRAM(s) Nebulizer once  aspirin  chewable 81 milliGRAM(s) Oral daily  atorvastatin 80 milliGRAM(s) Oral at bedtime  carvedilol 25 milliGRAM(s) Oral every 12 hours  dextrose 5%. 1000 milliLiter(s) (50 mL/Hr) IV Continuous <Continuous>  dextrose 50% Injectable 12.5 Gram(s) IV Push once  dextrose 50% Injectable 25 Gram(s) IV Push once  dextrose 50% Injectable 25 Gram(s) IV Push once  docusate sodium 100 milliGRAM(s) Oral two times a day  DULoxetine 60 milliGRAM(s) Oral daily  epoetin sara Injectable 59596 Unit(s) SubCutaneous <User Schedule>  ferrous sulfate Oral Tab/Cap - Peds 325 milliGRAM(s) Oral daily  furosemide Infusion 5 mG/Hr (2.5 mL/Hr) IV Continuous <Continuous>  gabapentin 100 milliGRAM(s) Oral three times a day  heparin  Injectable 5000 Unit(s) SubCutaneous every 8 hours  insulin lispro (HumaLOG) corrective regimen sliding scale   SubCutaneous three times a day before meals  iron sucrose IVPB 200 milliGRAM(s) IV Intermittent every 24 hours  isosorbide   mononitrate ER Tablet (IMDUR) 60 milliGRAM(s) Oral daily  magnesium oxide 400 milliGRAM(s) Oral daily  pantoprazole    Tablet 40 milliGRAM(s) Oral before breakfast    MEDICATIONS  (PRN):  acetaminophen   Tablet .. 650 milliGRAM(s) Oral every 6 hours PRN Mild Pain (1 - 3)  dextrose 40% Gel 15 Gram(s) Oral once PRN Blood Glucose LESS THAN 70 milliGRAM(s)/deciliter  glucagon  Injectable 1 milliGRAM(s) IntraMuscular once PRN Glucose LESS THAN 70 milligrams/deciliter      Allergies    Accupril (Other)    Intolerances        Vital Signs Last 24 Hrs  T(C): 36.5 (15 Feb 2019 10:01), Max: 36.6 (15 Feb 2019 06:35)  T(F): 97.7 (15 Feb 2019 10:01), Max: 97.8 (15 Feb 2019 06:35)  HR: 64 (15 Feb 2019 13:25) (52 - 66)  BP: 132/68 (15 Feb 2019 13:25) (110/60 - 171/74)  BP(mean): --  RR: 18 (15 Feb 2019 10:01) (18 - 18)  SpO2: 100% (15 Feb 2019 10:01) (99% - 100%)  Daily     Daily Weight in k.1 (15 Feb 2019 03:55)    PHYSICAL EXAM:  Comfortable  Cardiovascular: Normal S1 S2, No rub  Respiratory: Clear; diminished BS at bases	  Neuro: A & O x 3; non focal  Gastrointestinal:  Soft, Non-tender, + BS	  Extremities: trace LE edema      LABS:                        10.0   4.4   )-----------( 141      ( 15 Feb 2019 06:23 )             31.4     02-15    141  |  101  |  51.0<H>  ----------------------------<  132<H>  4.4   |  30.0<H>  |  1.01    Ca    8.2<L>      15 Feb 2019 06:23  Mg     2.4     02-15          Magnesium, Serum: 2.4 mg/dL (02-15 @ 06:23)    ABG - ( 15 Feb 2019 06:54 )  pH, Arterial: 7.34  pH, Blood: x     /  pCO2: 66    /  pO2: 77    / HCO3: 32    / Base Excess: 8.7   /  SaO2: 96                    RADIOLOGY & ADDITIONAL TESTS:

## 2019-02-15 NOTE — PROGRESS NOTE ADULT - SUBJECTIVE AND OBJECTIVE BOX
PULMONARY PROGRESS NOTE      KUN ROCHA  MRN-[id number]    Patient is a 63y old  Female who presents with a chief complaint of SOB (2019 17:21)      INTERVAL HPI/OVERNIGHT EVENTS:    Patient with worsening respiratory status earlier today after trying to come off of AVAPS  Patient is now back on AVAPS with improvement in respiratory distress  Patient reportedly with increased somnolence which has now improved as she is easily arousable though does fall back asleep    MEDICATIONS  (STANDING):  acetaminophen  IVPB .. 1000 milliGRAM(s) IV Intermittent once  ALBUTerol    0.083% 10 milliGRAM(s) Nebulizer once  aspirin  chewable 81 milliGRAM(s) Oral daily  atorvastatin 80 milliGRAM(s) Oral at bedtime  carvedilol 25 milliGRAM(s) Oral every 12 hours  dextrose 5%. 1000 milliLiter(s) (50 mL/Hr) IV Continuous <Continuous>  dextrose 50% Injectable 12.5 Gram(s) IV Push once  dextrose 50% Injectable 25 Gram(s) IV Push once  dextrose 50% Injectable 25 Gram(s) IV Push once  docusate sodium 100 milliGRAM(s) Oral two times a day  DULoxetine 60 milliGRAM(s) Oral daily  epoetin sara Injectable 43510 Unit(s) SubCutaneous <User Schedule>  ferrous sulfate Oral Tab/Cap - Peds 325 milliGRAM(s) Oral daily  furosemide Infusion 5 mG/Hr (2.5 mL/Hr) IV Continuous <Continuous>  gabapentin 100 milliGRAM(s) Oral three times a day  heparin  Injectable 5000 Unit(s) SubCutaneous every 8 hours  insulin lispro (HumaLOG) corrective regimen sliding scale   SubCutaneous three times a day before meals  iron sucrose IVPB 200 milliGRAM(s) IV Intermittent every 24 hours  isosorbide   mononitrate ER Tablet (IMDUR) 60 milliGRAM(s) Oral daily  magnesium oxide 400 milliGRAM(s) Oral daily  pantoprazole    Tablet 40 milliGRAM(s) Oral before breakfast      MEDICATIONS  (PRN):  acetaminophen   Tablet .. 650 milliGRAM(s) Oral every 6 hours PRN Mild Pain (1 - 3)  dextrose 40% Gel 15 Gram(s) Oral once PRN Blood Glucose LESS THAN 70 milliGRAM(s)/deciliter  glucagon  Injectable 1 milliGRAM(s) IntraMuscular once PRN Glucose LESS THAN 70 milligrams/deciliter      Allergies    Accupril (Other)    Intolerances        PAST MEDICAL & SURGICAL HISTORY:  Herniated disc, cervical  PAD (peripheral artery disease)  Legally blind  History of peripheral vascular disease  History of retinal detachment  History of CEA (carotid endarterectomy): Right  Hyperlipidemia  Glaucoma  Hypertension  Diabetes  S/P CABG x 1  After cataract  Uveitic glaucoma of both eyes  Detached retina  Atherosclerosis of right carotid artery   delivery delivered        REVIEW OF SYSTEMS: Pt somnolent therefore unable to provide    Vital Signs Last 24 Hrs  T(C): 36.5 (15 Feb 2019 10:01), Max: 36.6 (15 Feb 2019 06:35)  T(F): 97.7 (15 Feb 2019 10:01), Max: 97.8 (15 Feb 2019 06:35)  HR: 58 (15 Feb 2019 10:45) (52 - 66)  BP: 138/68 (15 Feb 2019 10:45) (110/60 - 171/74)  BP(mean): --  RR: 18 (15 Feb 2019 10:01) (18 - 18)  SpO2: 100% (15 Feb 2019 10:01) (99% - 100%)    PHYSICAL EXAMINATION:    GENERAL: The patient is awake and alert in no apparent distress. Morbidly obese    HEENT: Head is normocephalic and atraumatic. Extraocular muscles are intact.     NECK: Supple.    LUNGS: Clear to auscultation without wheezing, rales or rhonchi; respirations unlabored    HEART: Regular rate and rhythm without murmur.    ABDOMEN: Soft, nontender, and nondistended.      EXTREMITIES: Without any cyanosis, clubbing, rash, lesions or edema.    NEUROLOGIC: Grossly intact.    LABS:                        10.0   4.4   )-----------( 141      ( 15 Feb 2019 06:23 )             31.4     02-15    141  |  101  |  51.0<H>  ----------------------------<  132<H>  4.4   |  30.0<H>  |  1.01    Ca    8.2<L>      15 Feb 2019 06:23  Mg     2.4     02-15          ABG - ( 15 Feb 2019 06:54 )  pH, Arterial: 7.34  pH, Blood: x     /  pCO2: 66    /  pO2: 77    / HCO3: 32    / Base Excess: 8.7   /  SaO2: 96          Serum Pro-Brain Natriuretic Peptide: 5867 pg/mL (19 @ 05:32)      RADIOLOGY & ADDITIONAL STUDIES:       EXAM:  XR CHEST AP OR PA 1V                          PROCEDURE DATE:  2019          INTERPRETATION:  AP semierect chest on 2019 at 2:40 PM.   Patient has hypercapnia.    Poor inspiration crowds the chest. Heart suggest enlargement. Sternotomy   again noted.    There is a mild congestive picture in the lungs which may be slightly   increased from February 10.    IMPRESSION: CHF again noted, possibly somewhat increased from February 10.        TATE COX M.D., ATTENDING RADIOLOGIST  This document has been electronically signed. 2019  2:48PM          ECHO:      Summary:   1. Technically difficult study.   2. Left ventricular ejection fraction, by visual estimation, is 55 to   60%.   3. Normal global left ventricular systolic function.   4. Spectral Doppler shows pseudonormal pattern of left ventricular   myocardial filling (Grade II diastolic dysfunction). Elevated mean left   atrial pressure.   5. Normal left ventricular internal cavity size.   6. Limited views of the RV which appears moderately enlarged with   moderately reduced function.   7. Thickening of the anterior and posterior mitral valve leaflets.   8. Mild Mitral valve prolapse.   9. Moderate to severe mitral valve regurgitation.  10. Normal trileaflet aortic valve with normal opening.  11. Moderate-severe tricuspid regurgitation.  12. Estimated pulmonary artery systolic pressure is 42.0 mmHg assuming a   right atrial pressure of 15 mmHg, which is consistent with mild pulmonary   hypertension.  13. There is no evidence of pericardial effusion.    [id number] Pravin Moscoso MD, Electronically signed on 2019 at 3:19:24   PM

## 2019-02-16 DIAGNOSIS — R62.7 ADULT FAILURE TO THRIVE: ICD-10-CM

## 2019-02-16 LAB
ANION GAP SERPL CALC-SCNC: 9 MMOL/L — SIGNIFICANT CHANGE UP (ref 5–17)
BUN SERPL-MCNC: 36 MG/DL — HIGH (ref 8–20)
CALCIUM SERPL-MCNC: 8.6 MG/DL — SIGNIFICANT CHANGE UP (ref 8.6–10.2)
CHLORIDE SERPL-SCNC: 100 MMOL/L — SIGNIFICANT CHANGE UP (ref 98–107)
CO2 SERPL-SCNC: 36 MMOL/L — HIGH (ref 22–29)
CREAT SERPL-MCNC: 0.75 MG/DL — SIGNIFICANT CHANGE UP (ref 0.5–1.3)
GLUCOSE BLDC GLUCOMTR-MCNC: 111 MG/DL — HIGH (ref 70–99)
GLUCOSE BLDC GLUCOMTR-MCNC: 147 MG/DL — HIGH (ref 70–99)
GLUCOSE BLDC GLUCOMTR-MCNC: 222 MG/DL — HIGH (ref 70–99)
GLUCOSE SERPL-MCNC: 121 MG/DL — HIGH (ref 70–115)
HCT VFR BLD CALC: 31.1 % — LOW (ref 37–47)
HGB BLD-MCNC: 10.4 G/DL — LOW (ref 12–16)
MAGNESIUM SERPL-MCNC: 1.9 MG/DL — SIGNIFICANT CHANGE UP (ref 1.6–2.6)
MCHC RBC-ENTMCNC: 30.3 PG — SIGNIFICANT CHANGE UP (ref 27–31)
MCHC RBC-ENTMCNC: 33.4 G/DL — SIGNIFICANT CHANGE UP (ref 32–36)
MCV RBC AUTO: 90.7 FL — SIGNIFICANT CHANGE UP (ref 81–99)
PLATELET # BLD AUTO: 143 K/UL — LOW (ref 150–400)
POTASSIUM SERPL-MCNC: 3.5 MMOL/L — SIGNIFICANT CHANGE UP (ref 3.5–5.3)
POTASSIUM SERPL-SCNC: 3.5 MMOL/L — SIGNIFICANT CHANGE UP (ref 3.5–5.3)
RBC # BLD: 3.43 M/UL — LOW (ref 4.4–5.2)
RBC # FLD: 17 % — HIGH (ref 11–15.6)
SODIUM SERPL-SCNC: 145 MMOL/L — SIGNIFICANT CHANGE UP (ref 135–145)
WBC # BLD: 4.5 K/UL — LOW (ref 4.8–10.8)
WBC # FLD AUTO: 4.5 K/UL — LOW (ref 4.8–10.8)

## 2019-02-16 PROCEDURE — 99233 SBSQ HOSP IP/OBS HIGH 50: CPT

## 2019-02-16 RX ORDER — POTASSIUM CHLORIDE 20 MEQ
40 PACKET (EA) ORAL ONCE
Qty: 0 | Refills: 0 | Status: COMPLETED | OUTPATIENT
Start: 2019-02-16 | End: 2019-02-16

## 2019-02-16 RX ORDER — POTASSIUM CHLORIDE 20 MEQ
20 PACKET (EA) ORAL DAILY
Qty: 0 | Refills: 0 | Status: DISCONTINUED | OUTPATIENT
Start: 2019-02-17 | End: 2019-02-17

## 2019-02-16 RX ORDER — LOSARTAN POTASSIUM 100 MG/1
25 TABLET, FILM COATED ORAL DAILY
Qty: 0 | Refills: 0 | Status: DISCONTINUED | OUTPATIENT
Start: 2019-02-16 | End: 2019-02-20

## 2019-02-16 RX ORDER — AMLODIPINE BESYLATE 2.5 MG/1
5 TABLET ORAL DAILY
Qty: 0 | Refills: 0 | Status: DISCONTINUED | OUTPATIENT
Start: 2019-02-16 | End: 2019-02-18

## 2019-02-16 RX ADMIN — GABAPENTIN 100 MILLIGRAM(S): 400 CAPSULE ORAL at 21:28

## 2019-02-16 RX ADMIN — Medication 100 MILLIGRAM(S): at 18:16

## 2019-02-16 RX ADMIN — AMLODIPINE BESYLATE 5 MILLIGRAM(S): 2.5 TABLET ORAL at 12:30

## 2019-02-16 RX ADMIN — GABAPENTIN 100 MILLIGRAM(S): 400 CAPSULE ORAL at 06:20

## 2019-02-16 RX ADMIN — CARVEDILOL PHOSPHATE 25 MILLIGRAM(S): 80 CAPSULE, EXTENDED RELEASE ORAL at 06:20

## 2019-02-16 RX ADMIN — GABAPENTIN 100 MILLIGRAM(S): 400 CAPSULE ORAL at 18:16

## 2019-02-16 RX ADMIN — MAGNESIUM OXIDE 400 MG ORAL TABLET 400 MILLIGRAM(S): 241.3 TABLET ORAL at 12:29

## 2019-02-16 RX ADMIN — PANTOPRAZOLE SODIUM 40 MILLIGRAM(S): 20 TABLET, DELAYED RELEASE ORAL at 06:20

## 2019-02-16 RX ADMIN — Medication 81 MILLIGRAM(S): at 12:30

## 2019-02-16 RX ADMIN — HEPARIN SODIUM 5000 UNIT(S): 5000 INJECTION INTRAVENOUS; SUBCUTANEOUS at 18:16

## 2019-02-16 RX ADMIN — DULOXETINE HYDROCHLORIDE 60 MILLIGRAM(S): 30 CAPSULE, DELAYED RELEASE ORAL at 12:30

## 2019-02-16 RX ADMIN — CARVEDILOL PHOSPHATE 25 MILLIGRAM(S): 80 CAPSULE, EXTENDED RELEASE ORAL at 18:16

## 2019-02-16 RX ADMIN — Medication 20 MILLIGRAM(S): at 18:16

## 2019-02-16 RX ADMIN — ISOSORBIDE MONONITRATE 60 MILLIGRAM(S): 60 TABLET, EXTENDED RELEASE ORAL at 12:29

## 2019-02-16 RX ADMIN — HEPARIN SODIUM 5000 UNIT(S): 5000 INJECTION INTRAVENOUS; SUBCUTANEOUS at 21:28

## 2019-02-16 RX ADMIN — Medication 2: at 17:50

## 2019-02-16 RX ADMIN — Medication 100 MILLIGRAM(S): at 06:19

## 2019-02-16 RX ADMIN — Medication 40 MILLIEQUIVALENT(S): at 18:16

## 2019-02-16 RX ADMIN — ATORVASTATIN CALCIUM 80 MILLIGRAM(S): 80 TABLET, FILM COATED ORAL at 21:28

## 2019-02-16 RX ADMIN — HEPARIN SODIUM 5000 UNIT(S): 5000 INJECTION INTRAVENOUS; SUBCUTANEOUS at 06:20

## 2019-02-16 RX ADMIN — LOSARTAN POTASSIUM 25 MILLIGRAM(S): 100 TABLET, FILM COATED ORAL at 18:17

## 2019-02-16 RX ADMIN — Medication 325 MILLIGRAM(S): at 12:30

## 2019-02-16 NOTE — DIETITIAN INITIAL EVALUATION ADULT. - NS FNS SUPPLEMENTS
Glucerna Shake®/TID to optimize po intake and provide an additional 220 kcal, 10g protein per serving

## 2019-02-16 NOTE — PROGRESS NOTE ADULT - ASSESSMENT
Pt is a 62 yo presenting to ED w/ SOB 2/2 diastolic HF exacerbation and noted to have KEITH and hyperkalemia, PMH CAD s/p CABG and PCI, PAD s/p baloon angioplasty, DM2, HTN, HLD, hx of cardiac arrest, recent GI bleed last month, Diastolic CHF, legally blind, pleural effusion requiring thoracentesis in Dec 2018.     1) Acute on Chronic Diastolic Heart Failure  - Daily weight  - Strict intake/output  - Lasix Infusion switched to Demadex 20 mg BID   - Losartan was restarted today  - Cardiology input appreciated   - ECHO shows EF of > 75%. Moderate to Severe TR  2) Acute on Chronic Hypercapnic Respiratory Failure  - H/O OHS/KYREE  - Supposed to be on Trilogy at home as per Pulmonary Note from previous admission  - Continue NIV (AVAPS) Nocturnal and PRN  - ICU Consult appreciated.   - Pulmonary input appreciated  - Avoid Narcotics for pain  3) Acute on chronic kidney injury  - Improved  - Avoid nephrotoxic medications  - Renal input appreciated  4) Hyperkalemia  - Improved  5) CAD / PAD / HLD / HTN  - Continue Aspirin, Imdur, Lipitor, Coreg, Losartan and Norvasc  - Plavix on hold since last admission due to GI Bleed  6) Anemia  - Chronic  - H&H stable  - s/p Venofer   - Continue Procrit and Ferrous Sulfate 325 mg   7) Transaminitis  - Likely secondary to liver congestion  - Improved  8) History of Depression  - Continue Duloxetine 60 mg     DVT Prophylaxis -- IPC and Heparin 5000 Units Pt is a 62 yo presenting to ED w/ SOB 2/2 diastolic HF exacerbation and noted to have KEITH and hyperkalemia, PMH CAD s/p CABG and PCI, PAD s/p baloon angioplasty, DM2, HTN, HLD, hx of cardiac arrest, recent GI bleed last month, Diastolic CHF, legally blind, pleural effusion requiring thoracentesis in Dec 2018.     1) Acute on Chronic Diastolic Heart Failure  - Daily weight  - Strict intake/output  - Lasix Infusion switched to Demadex 20 mg BID   - Losartan was restarted today  - Cardiology input appreciated   - ECHO shows EF of > 75%. Moderate to Severe TR  2) Acute on Chronic Hypercapnic Respiratory Failure  - H/O OHS/KYREE  - Supposed to be on Trilogy at home as per Pulmonary Note from previous admission  - Continue NIV (AVAPS) Nocturnal and PRN  - ICU Consult appreciated.   - Pulmonary input appreciated  - Avoid Narcotics for pain  3) Acute on chronic kidney injury  - Improved  - Avoid nephrotoxic medications  - Renal input appreciated  4) Hyperkalemia  - Improved  5) CAD / PAD / HLD / HTN  - Continue Aspirin, Imdur, Lipitor, Coreg, Losartan and Norvasc  - Plavix on hold since last admission due to GI Bleed  6) Anemia  - Chronic  - H&H stable  - s/p Venofer   - Continue Procrit and Ferrous Sulfate 325 mg   7) Transaminitis  - Likely secondary to liver congestion  - Improved  8) History of Depression  - Continue Duloxetine 60 mg     DVT Prophylaxis -- IPC and Heparin 5000 Units    Dispo: [facility]

## 2019-02-16 NOTE — PROGRESS NOTE ADULT - SUBJECTIVE AND OBJECTIVE BOX
PULMONARY PROGRESS NOTE      KUN ROCHA  MRN-[id number]    Patient is a 63y old  Female who presents with a chief complaint of SOB (15 Feb 2019 16:19)      INTERVAL HPI/OVERNIGHT EVENTS:    Patient feels better  Off of BiPAP  Breathing easier    MEDICATIONS  (STANDING):  acetaminophen  IVPB .. 1000 milliGRAM(s) IV Intermittent once  ALBUTerol    0.083% 10 milliGRAM(s) Nebulizer once  amLODIPine   Tablet 5 milliGRAM(s) Oral daily  aspirin  chewable 81 milliGRAM(s) Oral daily  atorvastatin 80 milliGRAM(s) Oral at bedtime  carvedilol 25 milliGRAM(s) Oral every 12 hours  dextrose 5%. 1000 milliLiter(s) (50 mL/Hr) IV Continuous <Continuous>  dextrose 50% Injectable 12.5 Gram(s) IV Push once  dextrose 50% Injectable 25 Gram(s) IV Push once  dextrose 50% Injectable 25 Gram(s) IV Push once  docusate sodium 100 milliGRAM(s) Oral two times a day  DULoxetine 60 milliGRAM(s) Oral daily  epoetin sara Injectable 02544 Unit(s) SubCutaneous <User Schedule>  ferrous sulfate Oral Tab/Cap - Peds 325 milliGRAM(s) Oral daily  furosemide Infusion 5 mG/Hr (2.5 mL/Hr) IV Continuous <Continuous>  gabapentin 100 milliGRAM(s) Oral three times a day  heparin  Injectable 5000 Unit(s) SubCutaneous every 8 hours  insulin lispro (HumaLOG) corrective regimen sliding scale   SubCutaneous three times a day before meals  isosorbide   mononitrate ER Tablet (IMDUR) 60 milliGRAM(s) Oral daily  magnesium oxide 400 milliGRAM(s) Oral daily  pantoprazole    Tablet 40 milliGRAM(s) Oral before breakfast      MEDICATIONS  (PRN):  acetaminophen   Tablet .. 650 milliGRAM(s) Oral every 6 hours PRN Mild Pain (1 - 3)  dextrose 40% Gel 15 Gram(s) Oral once PRN Blood Glucose LESS THAN 70 milliGRAM(s)/deciliter  glucagon  Injectable 1 milliGRAM(s) IntraMuscular once PRN Glucose LESS THAN 70 milligrams/deciliter      Allergies    Accupril (Other)    Intolerances        PAST MEDICAL & SURGICAL HISTORY:  Herniated disc, cervical  PAD (peripheral artery disease)  Legally blind  History of peripheral vascular disease  History of retinal detachment  History of CEA (carotid endarterectomy): Right  Hyperlipidemia  Glaucoma  Hypertension  Diabetes  S/P CABG x 1  After cataract  Uveitic glaucoma of both eyes  Detached retina  Atherosclerosis of right carotid artery   delivery delivered        REVIEW OF SYSTEMS:    CONSTITUTIONAL:  No distress    HEENT:  Eyes:  No diplopia or blurred vision. ENT:  No earache, sore throat or runny nose.    CARDIOVASCULAR:  No pressure, squeezing, tightness, heaviness or aching about the chest; no palpitations.    RESPIRATORY:  Improved cough, shortness of breath, no PND or orthopnea. Mild SOBOE    GASTROINTESTINAL:  No nausea, vomiting or diarrhea.    GENITOURINARY:  No dysuria, frequency or urgency.    NEUROLOGIC:  No paresthesias, fasciculations, seizures or weakness.    PSYCHIATRIC:  No disorder of thought or mood.    Vital Signs Last 24 Hrs  T(C): 36.8 (2019 10:45), Max: 36.8 (15 Feb 2019 21:22)  T(F): 98.2 (2019 10:45), Max: 98.3 (15 Feb 2019 21:22)  HR: 67 (2019 10:45) (62 - 67)  BP: 143/75 (2019 10:45) (132/68 - 171/69)  BP(mean): --  RR: 20 (2019 10:45) (16 - 20)  SpO2: 99% (2019 10:45) (97% - 100%)    PHYSICAL EXAMINATION:    GENERAL: The patient is awake and alert in no apparent distress. Obese    HEENT: Head is normocephalic and atraumatic. Extraocular muscles are intact. Mucous membranes are moist.    NECK: Supple.    LUNGS: Decreased BS otherwise CTA without wheezing, rales or rhonchi; respirations unlabored    HEART: Regular rate and rhythm without murmur.    ABDOMEN: Soft, nontender, and nondistended.      EXTREMITIES: Without any cyanosis, clubbing, rash, lesions or edema.    NEUROLOGIC: Grossly intact.    LABS:                        10.4   4.5   )-----------( 143      ( 2019 07:08 )             31.1     02-16    145  |  100  |  36.0<H>  ----------------------------<  121<H>  3.5   |  36.0<H>  |  0.75    Ca    8.6      2019 07:08  Mg     1.9     02-16          ABG - ( 15 Feb 2019 19:24 )  pH, Arterial: 7.47  pH, Blood: x     /  pCO2: 50    /  pO2: 142   / HCO3: 35    / Base Excess: 11.6  /  SaO2: 100             RADIOLOGY & ADDITIONAL STUDIES:         EXAM:  XR CHEST AP OR PA 1V                          PROCEDURE DATE:  2019          INTERPRETATION:  AP semierect chest on 2019 at 2:40 PM.   Patient has hypercapnia.    Poor inspiration crowds the chest. Heart suggest enlargement. Sternotomy   again noted.    There is a mild congestive picture in the lungs which may be slightly   increased from February 10.    IMPRESSION: CHF again noted, possibly somewhat increased from February 10.        TATE COX M.D., ATTENDING RADIOLOGIST  This document has been electronically signed. 2019  2:48PM        ECHO 2019:      Summary:   1. Technically suboptimal study.   2. Left ventricular ejection fraction, by visual estimation, is >75%.   3. Hyperdynamic global left ventricular systolic function.   4. There is mild concentric left ventricular hypertrophy.   5. Mild mitral annular calcification.   6. Moderate mitral valve regurgitation.   7. Thickening and calcification of the anterior and posterior mitral   valve leaflets.   8. Moderate-severe tricuspid regurgitation.   9. Estimated pulmonary artery systolic pressure is 55.7 mmHg assuming a   right atrial pressure of 5 mmHg, which is consistent with moderate   pulmonary hypertension.  10. Endocardial visualization was enhanced with intravenous echo contrast.    [id number] Andrew Torres MD, Electronically signed on 2/15/2019 at 6:14:21 PM         Echo 2019:    Summary:   1. Technically difficult study.   2. Left ventricular ejection fraction, by visual estimation, is 55 to   60%.   3. Normal global left ventricular systolic function.   4. Spectral Doppler shows pseudonormal pattern of left ventricular   myocardial filling (Grade II diastolic dysfunction). Elevated mean left   atrial pressure.   5. Normal left ventricular internal cavity size.   6. Limited views of the RV which appears moderately enlarged with   moderately reduced function.   7. Thickening of the anterior and posterior mitral valve leaflets.   8. Mild Mitral valve prolapse.   9. Moderate to severe mitral valve regurgitation.  10. Normal trileaflet aortic valve with normal opening.  11. Moderate-severe tricuspid regurgitation.  12. Estimated pulmonary artery systolic pressure is 42.0 mmHg assuming a   right atrial pressure of 15 mmHg, which is consistent with mild pulmonary   hypertension.  13. There is no evidence of pericardial effusion.    [id number] Pravin Moscoso MD, Electronically signed on 2019 at 3:19:24   PM

## 2019-02-16 NOTE — PROGRESS NOTE ADULT - ASSESSMENT
Echo with mod to severe MR, mild to mod pulm HTN based on last 2 echo  Acute on chronic hypercapnic respiratory failure  Decreased mental status may be due hypercarbia as she appears to improve on AVAPS  Likely KYREE/OSH  CAD s/p CABG and PCI  PAD s/p baloon angioplasty  H/o cardiac arrest  CHFpEF  Diastolic dysfunction  Prior pleural effusion s/p drainage  Legally blind    Plan:    Continue AVAPS nocturnal and prn; now doing well of of NIV  Likely will require chronic NIV  Diurese as tolerates  Optimize cardiac function  Follow ABG as needed though last ABG from this AM revealed improved, compensated hypercarbia  On heparin for DVT prophylaxis  On PPI for GI prophylaxis  Albuterol as needed  Mobilize as able  Likely eventual SINGH  Follow CXR for improvement of CHF; clinically has improved

## 2019-02-16 NOTE — PROGRESS NOTE ADULT - SUBJECTIVE AND OBJECTIVE BOX
[facility]  [address] [address]    REASON FOR VISIT: follow up on chf  UPDATE:  Diuresed well 4500 cc on lasix drip  TELEMETRY MONITORING:  Nsr with apcs        02-16    145  |  100  |  36.0<H>  ----------------------------<  121<H>  3.5   |  36.0<H>  |  0.75    Ca    8.6      16 Feb 2019 07:08  Mg     1.9     02-16          MEDICATIONS  (STANDING):  acetaminophen  IVPB .. 1000 milliGRAM(s) IV Intermittent once  ALBUTerol    0.083% 10 milliGRAM(s) Nebulizer once  amLODIPine   Tablet 5 milliGRAM(s) Oral daily  aspirin  chewable 81 milliGRAM(s) Oral daily  atorvastatin 80 milliGRAM(s) Oral at bedtime  carvedilol 25 milliGRAM(s) Oral every 12 hours  docusate sodium 100 milliGRAM(s) Oral two times a day  DULoxetine 60 milliGRAM(s) Oral daily  epoetin sara Injectable 88925 Unit(s) SubCutaneous <User Schedule>  ferrous sulfate Oral Tab/Cap - Peds 325 milliGRAM(s) Oral daily  furosemide Infusion 5 mG/Hr (2.5 mL/Hr) IV Continuous <Continuous>  gabapentin 100 milliGRAM(s) Oral three times a day  heparin  Injectable 5000 Unit(s) SubCutaneous every 8 hours  insulin lispro (HumaLOG) corrective regimen sliding scale   SubCutaneous three times a day before meals  isosorbide   mononitrate ER Tablet (IMDUR) 60 milliGRAM(s) Oral daily  magnesium oxide 400 milliGRAM(s) Oral daily  pantoprazole    Tablet 40 milliGRAM(s) Oral before breakfast    ROS:    No fever chills  Cardiac  No cp sob or palp  Resp  no cough no mucus production  Gi  no abd pain no melana  Ext no c/c/ edema    Vital Signs Last 24 Hrs  T(C): 36.8 (16 Feb 2019 10:45), Max: 36.8 (15 Feb 2019 21:22)  T(F): 98.2 (16 Feb 2019 10:45), Max: 98.3 (15 Feb 2019 21:22)  HR: 67 (16 Feb 2019 10:45) (62 - 67)  BP: 143/75 (16 Feb 2019 10:45) (143/75 - 171/69)  BP(mean): --  RR: 20 (16 Feb 2019 10:45) (16 - 20)  SpO2: 99% (16 Feb 2019 10:45) (97% - 100%)  T(C): 36.8 (02-16-19 @ 10:45), Max: 36.8 (02-15-19 @ 21:22)  HR: 67 (02-16-19 @ 10:45) (62 - 67)  BP: 143/75 (02-16-19 @ 10:45) (143/75 - 171/69)  RR: 20 (02-16-19 @ 10:45) (16 - 20)  SpO2: 99% (02-16-19 @ 10:45) (97% - 100%)    HEENT No jvd  CV s1&s2 regular  RESP  clear lung fields  GI  Soft non tender  EXT + edema upper and lower  NEURO Arousable but lethargic [facility]  [facility]  [address] [address]    REASON FOR VISIT: follow up on chf  UPDATE:  Diuresed well 4500 cc on lasix drip  TELEMETRY MONITORING:  Nsr with apcs    02-16    145  |  100  |  36.0<H>  ----------------------------<  121<H>  3.5   |  36.0<H>  |  0.75    Ca    8.6      16 Feb 2019 07:08  Mg     1.9     02-16      MEDICATIONS  (STANDING):  acetaminophen  IVPB .. 1000 milliGRAM(s) IV Intermittent once  ALBUTerol    0.083% 10 milliGRAM(s) Nebulizer once  amLODIPine   Tablet 5 milliGRAM(s) Oral daily  aspirin  chewable 81 milliGRAM(s) Oral daily  atorvastatin 80 milliGRAM(s) Oral at bedtime  carvedilol 25 milliGRAM(s) Oral every 12 hours  docusate sodium 100 milliGRAM(s) Oral two times a day  DULoxetine 60 milliGRAM(s) Oral daily  epoetin sara Injectable 85935 Unit(s) SubCutaneous <User Schedule>  ferrous sulfate Oral Tab/Cap - Peds 325 milliGRAM(s) Oral daily  furosemide Infusion 5 mG/Hr (2.5 mL/Hr) IV Continuous <Continuous>  gabapentin 100 milliGRAM(s) Oral three times a day  heparin  Injectable 5000 Unit(s) SubCutaneous every 8 hours  insulin lispro (HumaLOG) corrective regimen sliding scale   SubCutaneous three times a day before meals  isosorbide   mononitrate ER Tablet (IMDUR) 60 milliGRAM(s) Oral daily  magnesium oxide 400 milliGRAM(s) Oral daily  pantoprazole    Tablet 40 milliGRAM(s) Oral before breakfast    ROS:    No fever chills  Cardiac  No cp sob or palp  Resp  no cough no mucus production  Gi  no abd pain no melana  Ext no c/c/ edema    Vital Signs Last 24 Hrs  T(C): 36.8 (16 Feb 2019 10:45), Max: 36.8 (15 Feb 2019 21:22)  T(F): 98.2 (16 Feb 2019 10:45), Max: 98.3 (15 Feb 2019 21:22)  HR: 67 (16 Feb 2019 10:45) (62 - 67)  BP: 143/75 (16 Feb 2019 10:45) (143/75 - 171/69)  BP(mean): --  RR: 20 (16 Feb 2019 10:45) (16 - 20)  SpO2: 99% (16 Feb 2019 10:45) (97% - 100%)  T(C): 36.8 (02-16-19 @ 10:45), Max: 36.8 (02-15-19 @ 21:22)  HR: 67 (02-16-19 @ 10:45) (62 - 67)  BP: 143/75 (02-16-19 @ 10:45) (143/75 - 171/69)  RR: 20 (02-16-19 @ 10:45) (16 - 20)  SpO2: 99% (02-16-19 @ 10:45) (97% - 100%)    HEENT No jvd  CV s1&s2 regular  RESP  clear lung fields  GI  Soft non tender  EXT + edema upper and lower  NEURO Arousable but lethargic

## 2019-02-16 NOTE — PROGRESS NOTE ADULT - ASSESSMENT
64y/o female with obesity, htn, hld, cad s/p cabg islas->LAD 5/2015), MADHAVI x2-> LCX & OM1   nst 11/18  c/w single vessel disease, right heart cath c/w: is severe left sided failure. and severe pulmonary hypertension/rv failure  who presents with sob.  diuresed well with iv lasix

## 2019-02-16 NOTE — DIETITIAN INITIAL EVALUATION ADULT. - OTHER INFO
64 yo presents w/ SOB 2/2 HF exacerbation and noted to have KEITH and hyperkalemia. PMH CAD s/p CABG and PCI, PAD s/p baloon angioplasty, DM2, HTN, HLD, hx of cardiac arrest, recent GI bleed, CHF, legally blind, and pleural effusion requiring thoracentesis. Pt reports good appetite/po intake PTA, however, since admission states appetite has been poor. Pt reports weight loss of 60 lbs over the last year or so; per EMR review, weight increase 12 lbs since last admission one month ago. Reports she follows a low Na, low sugar diet at home. Denies chewing/swallowing difficulty. Pt declined diet education at this time. Family member at bedside assisting pt with breakfast- consumed ~50% at time of interview. Pt needs assistance with meals due to vision loss.

## 2019-02-16 NOTE — DIETITIAN INITIAL EVALUATION ADULT. - PERTINENT LABORATORY DATA
02-16 Na145 mmol/L Glu 121 mg/dL<H> K+ 3.5 mmol/L Cr  0.75 mg/dL BUN 36.0 mg/dL<H> Phos n/a   Alb n/a   PAB n/a
No

## 2019-02-16 NOTE — PROGRESS NOTE ADULT - SUBJECTIVE AND OBJECTIVE BOX
Heart failure    HPI:  Pt is a 62 yo presenting to ED w/ SOB, PMH CAD s/p CABG and PCI, PAD s/p baloon angioplasty, DM2, HTN, HLD, hx of cardiac arrest, recent GI bleed last month, Diastolic CHF, legally blind, pleural effusion requiring thoracentesis in Dec 2018.   Patient states that she has been having increasing SOB over the last week, she lives at home with her family, denies sick contacts, but has been having more exertional dyspnea. She states she walks with a walker but it is taking less activity for her dyspnea to occur. She has been compliant with all of her medications, she does not recall the names, but states that there have been no changes to her medication regimen since her discharge. She denies any associated chest pain or pressure. She has been taking her tosemide she believes. Denies PND or orthopnea, but she has noted increased LE edema.   Denies headaches, nausea, vomiting, chest pain, palpitations, abdominal pain, constipation, diarrhea, melena, hematochezia, dysuria.   Her SOB has since resolved. She is breathing comfortably, has no complaints.  In ED patient was noted to have hyperkalemia on repeat labs, troponin elevated at 0.09. Her creatinine is also elevated. She did not receive any potassium treatment initially so I ordered Calcium gluc, kayexelate, Insulin, dextrose, Albuterol. EKG without widened QRS or peaked T waves. (11 Feb 2019 03:58)    Interval History:  Patient was seen and examined at bedside around 9 am. Lying in bed comfortably.    Denies chest pain, palpitations, shortness of breath, headache, dizziness, visual symptoms, nausea, vomiting or abdominal pain.    ROS:  As per interval history otherwise unremarkable.    PHYSICAL EXAM:  Vital Signs   T(C): 36.8 (16 Feb 2019 15:28), Max: 36.8 (15 Feb 2019 21:22)  T(F): 98.2 (16 Feb 2019 15:28), Max: 98.3 (15 Feb 2019 21:22)  HR: 75 (16 Feb 2019 15:28) (62 - 75)  BP: 171/88 (16 Feb 2019 15:28) (143/75 - 171/88)  RR: 20 (16 Feb 2019 15:28) (16 - 20)  SpO2: 100% (16 Feb 2019 15:28) (98% - 100%)  General: Well developed. Well nourished. No acute distress  HEENT: Opaque right cornea. EOMI. Clear conjunctivae. Moist mucus membrane  Neck: Supple. No JVD.   Chest: Decreased air entry at bases. No wheezing, rales or rhonchi.   Heart: Normal S1 & S2. RRR. No murmur.   Abdomen: Soft. Non-tender. Non-distended. + BS. Abdominal wall edema.   Ext: 2+ pedal edema. No calf tenderness. 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    Neuro: Lethargic but AAO x 3. No focal deficit. No speech disorder  Skin: Warm and Dry  Psychiatry: Normal mood and affect    MEDICATIONS  (STANDING):  acetaminophen  IVPB .. 1000 milliGRAM(s) IV Intermittent once  ALBUTerol    0.083% 10 milliGRAM(s) Nebulizer once  amLODIPine   Tablet 5 milliGRAM(s) Oral daily  aspirin  chewable 81 milliGRAM(s) Oral daily  atorvastatin 80 milliGRAM(s) Oral at bedtime  carvedilol 25 milliGRAM(s) Oral every 12 hours  dextrose 5%. 1000 milliLiter(s) (50 mL/Hr) IV Continuous <Continuous>  dextrose 50% Injectable 12.5 Gram(s) IV Push once  dextrose 50% Injectable 25 Gram(s) IV Push once  dextrose 50% Injectable 25 Gram(s) IV Push once  docusate sodium 100 milliGRAM(s) Oral two times a day  DULoxetine 60 milliGRAM(s) Oral daily  epoetin sara Injectable 03339 Unit(s) SubCutaneous <User Schedule>  ferrous sulfate Oral Tab/Cap - Peds 325 milliGRAM(s) Oral daily  gabapentin 100 milliGRAM(s) Oral three times a day  heparin  Injectable 5000 Unit(s) SubCutaneous every 8 hours  insulin lispro (HumaLOG) corrective regimen sliding scale   SubCutaneous three times a day before meals  isosorbide   mononitrate ER Tablet (IMDUR) 60 milliGRAM(s) Oral daily  losartan 25 milliGRAM(s) Oral daily  magnesium oxide 400 milliGRAM(s) Oral daily  pantoprazole    Tablet 40 milliGRAM(s) Oral before breakfast  potassium chloride    Tablet ER 40 milliEquivalent(s) Oral once  torsemide 20 milliGRAM(s) Oral every 12 hours    MEDICATIONS  (PRN):  acetaminophen   Tablet .. 650 milliGRAM(s) Oral every 6 hours PRN Mild Pain (1 - 3)  dextrose 40% Gel 15 Gram(s) Oral once PRN Blood Glucose LESS THAN 70 milliGRAM(s)/deciliter  glucagon  Injectable 1 milliGRAM(s) IntraMuscular once PRN Glucose LESS THAN 70 milligrams/deciliter    LABS:  CAPILLARY BLOOD GLUCOSE  POCT Blood Glucose.: 147 mg/dL (16 Feb 2019 12:42)  POCT Blood Glucose.: 111 mg/dL (16 Feb 2019 08:51)  POCT Blood Glucose.: 146 mg/dL (15 Feb 2019 21:55)  POCT Blood Glucose.: 126 mg/dL (15 Feb 2019 16:52)                          10.4   4.5   )-----------( 143      ( 16 Feb 2019 07:08 )             31.1     02-16    145  |  100  |  36.0<H>  ----------------------------<  121<H>  3.5   |  36.0<H>  |  0.75    Ca    8.6      16 Feb 2019 07:08  Mg     1.9     02-16      RADIOLOGY & ADDITIONAL STUDIES:  Reviewed

## 2019-02-16 NOTE — PROGRESS NOTE ADULT - PROBLEM SELECTOR PLAN 1
will d/c lasix drip and start demadex will d/c lasix drip and start demadex  b/p controlled with norvasc, coreg  restart arb  Imdur added  Low na diet  fluid restriction

## 2019-02-17 LAB
ANION GAP SERPL CALC-SCNC: 8 MMOL/L — SIGNIFICANT CHANGE UP (ref 5–17)
BUN SERPL-MCNC: 30 MG/DL — HIGH (ref 8–20)
CALCIUM SERPL-MCNC: 8.5 MG/DL — LOW (ref 8.6–10.2)
CHLORIDE SERPL-SCNC: 105 MMOL/L — SIGNIFICANT CHANGE UP (ref 98–107)
CO2 SERPL-SCNC: 37 MMOL/L — HIGH (ref 22–29)
CREAT SERPL-MCNC: 0.82 MG/DL — SIGNIFICANT CHANGE UP (ref 0.5–1.3)
GLUCOSE BLDC GLUCOMTR-MCNC: 172 MG/DL — HIGH (ref 70–99)
GLUCOSE BLDC GLUCOMTR-MCNC: 186 MG/DL — HIGH (ref 70–99)
GLUCOSE BLDC GLUCOMTR-MCNC: 198 MG/DL — HIGH (ref 70–99)
GLUCOSE BLDC GLUCOMTR-MCNC: 258 MG/DL — HIGH (ref 70–99)
GLUCOSE SERPL-MCNC: 186 MG/DL — HIGH (ref 70–115)
MAGNESIUM SERPL-MCNC: 1.7 MG/DL — SIGNIFICANT CHANGE UP (ref 1.6–2.6)
POTASSIUM SERPL-MCNC: 3.8 MMOL/L — SIGNIFICANT CHANGE UP (ref 3.5–5.3)
POTASSIUM SERPL-SCNC: 3.8 MMOL/L — SIGNIFICANT CHANGE UP (ref 3.5–5.3)
SODIUM SERPL-SCNC: 150 MMOL/L — HIGH (ref 135–145)

## 2019-02-17 PROCEDURE — 99232 SBSQ HOSP IP/OBS MODERATE 35: CPT

## 2019-02-17 RX ORDER — MAGNESIUM SULFATE 500 MG/ML
2 VIAL (ML) INJECTION ONCE
Qty: 0 | Refills: 0 | Status: COMPLETED | OUTPATIENT
Start: 2019-02-17 | End: 2019-02-17

## 2019-02-17 RX ORDER — POTASSIUM CHLORIDE 20 MEQ
40 PACKET (EA) ORAL ONCE
Qty: 0 | Refills: 0 | Status: COMPLETED | OUTPATIENT
Start: 2019-02-17 | End: 2019-02-17

## 2019-02-17 RX ORDER — ONDANSETRON 8 MG/1
4 TABLET, FILM COATED ORAL EVERY 6 HOURS
Qty: 0 | Refills: 0 | Status: DISCONTINUED | OUTPATIENT
Start: 2019-02-17 | End: 2019-03-26

## 2019-02-17 RX ADMIN — Medication 20 MILLIGRAM(S): at 05:20

## 2019-02-17 RX ADMIN — ONDANSETRON 4 MILLIGRAM(S): 8 TABLET, FILM COATED ORAL at 12:58

## 2019-02-17 RX ADMIN — MAGNESIUM OXIDE 400 MG ORAL TABLET 400 MILLIGRAM(S): 241.3 TABLET ORAL at 11:20

## 2019-02-17 RX ADMIN — GABAPENTIN 100 MILLIGRAM(S): 400 CAPSULE ORAL at 16:09

## 2019-02-17 RX ADMIN — CARVEDILOL PHOSPHATE 25 MILLIGRAM(S): 80 CAPSULE, EXTENDED RELEASE ORAL at 05:19

## 2019-02-17 RX ADMIN — AMLODIPINE BESYLATE 5 MILLIGRAM(S): 2.5 TABLET ORAL at 05:32

## 2019-02-17 RX ADMIN — HEPARIN SODIUM 5000 UNIT(S): 5000 INJECTION INTRAVENOUS; SUBCUTANEOUS at 16:08

## 2019-02-17 RX ADMIN — HEPARIN SODIUM 5000 UNIT(S): 5000 INJECTION INTRAVENOUS; SUBCUTANEOUS at 05:20

## 2019-02-17 RX ADMIN — Medication 100 MILLIGRAM(S): at 05:20

## 2019-02-17 RX ADMIN — Medication 1: at 17:39

## 2019-02-17 RX ADMIN — Medication 100 MILLIGRAM(S): at 17:39

## 2019-02-17 RX ADMIN — GABAPENTIN 100 MILLIGRAM(S): 400 CAPSULE ORAL at 21:46

## 2019-02-17 RX ADMIN — PANTOPRAZOLE SODIUM 40 MILLIGRAM(S): 20 TABLET, DELAYED RELEASE ORAL at 05:20

## 2019-02-17 RX ADMIN — HEPARIN SODIUM 5000 UNIT(S): 5000 INJECTION INTRAVENOUS; SUBCUTANEOUS at 21:46

## 2019-02-17 RX ADMIN — Medication 50 GRAM(S): at 16:08

## 2019-02-17 RX ADMIN — Medication 1: at 08:29

## 2019-02-17 RX ADMIN — Medication 1: at 12:40

## 2019-02-17 RX ADMIN — Medication 325 MILLIGRAM(S): at 11:20

## 2019-02-17 RX ADMIN — ATORVASTATIN CALCIUM 80 MILLIGRAM(S): 80 TABLET, FILM COATED ORAL at 21:46

## 2019-02-17 RX ADMIN — Medication 81 MILLIGRAM(S): at 11:22

## 2019-02-17 RX ADMIN — GABAPENTIN 100 MILLIGRAM(S): 400 CAPSULE ORAL at 05:20

## 2019-02-17 RX ADMIN — LOSARTAN POTASSIUM 25 MILLIGRAM(S): 100 TABLET, FILM COATED ORAL at 05:20

## 2019-02-17 RX ADMIN — DULOXETINE HYDROCHLORIDE 60 MILLIGRAM(S): 30 CAPSULE, DELAYED RELEASE ORAL at 11:20

## 2019-02-17 RX ADMIN — CARVEDILOL PHOSPHATE 25 MILLIGRAM(S): 80 CAPSULE, EXTENDED RELEASE ORAL at 17:39

## 2019-02-17 RX ADMIN — Medication 40 MILLIEQUIVALENT(S): at 11:24

## 2019-02-17 RX ADMIN — ISOSORBIDE MONONITRATE 60 MILLIGRAM(S): 60 TABLET, EXTENDED RELEASE ORAL at 11:20

## 2019-02-17 NOTE — PROGRESS NOTE ADULT - ASSESSMENT
KEITH: decompensated CHF (R > L)  cr better  - cont IV Lasix for now  - daily weights and adjust dose accordingly==> will need to tolerate some degree of azotemia  - monitor labs  - await urine prot: creat    Anemia:   - cont IV Fe  - added QUENTIN  - trend H/H; target Hgb=10.0    Will follow KEITH: decompensated CHF (R > L)  cr better  - off lasix  - Na rising watch  - monitor labs  - urine prot: creat 1.1 gm proteinuria    Anemia:   - cont IV Fe  - added QUENTIN  - trend H/H; target Hgb=10.0    Will follow

## 2019-02-17 NOTE — PROGRESS NOTE ADULT - PROBLEM SELECTOR PLAN 1
Na is rising and patient still with significant amount of fluid overload  Diuretics need ot be decreased  oral hydration  bmp in the am

## 2019-02-17 NOTE — PROGRESS NOTE ADULT - SUBJECTIVE AND OBJECTIVE BOX
[facility]  [address], [place] [address]    REASON FOR VISIT:  Follow up on chf  UPDATE:  Patient looks much better today.  More alert  moving in the bed  Na is starting to drift up    TELEMETRY MONITORING:  Sinus with pvcs        02-17    150<H>  |  105  |  30.0<H>  ----------------------------<  186<H>  3.8   |  37.0<H>  |  0.82    Ca    8.5<L>      17 Feb 2019 06:52  Mg     1.7     02-17          MEDICATIONS  (STANDING):  acetaminophen  IVPB .. 1000 milliGRAM(s) IV Intermittent once  ALBUTerol    0.083% 10 milliGRAM(s) Nebulizer once  amLODIPine   Tablet 5 milliGRAM(s) Oral daily  aspirin  chewable 81 milliGRAM(s) Oral daily  atorvastatin 80 milliGRAM(s) Oral at bedtime  carvedilol 25 milliGRAM(s) Oral every 12 hours  dextrose 5%. 1000 milliLiter(s) (50 mL/Hr) IV Continuous <Continuous>  dextrose 50% Injectable 12.5 Gram(s) IV Push once  dextrose 50% Injectable 25 Gram(s) IV Push once  dextrose 50% Injectable 25 Gram(s) IV Push once  docusate sodium 100 milliGRAM(s) Oral two times a day  DULoxetine 60 milliGRAM(s) Oral daily  epoetin sara Injectable 13949 Unit(s) SubCutaneous <User Schedule>  ferrous sulfate Oral Tab/Cap - Peds 325 milliGRAM(s) Oral daily  gabapentin 100 milliGRAM(s) Oral three times a day  heparin  Injectable 5000 Unit(s) SubCutaneous every 8 hours  insulin lispro (HumaLOG) corrective regimen sliding scale   SubCutaneous three times a day before meals  isosorbide   mononitrate ER Tablet (IMDUR) 60 milliGRAM(s) Oral daily  losartan 25 milliGRAM(s) Oral daily  magnesium oxide 400 milliGRAM(s) Oral daily  pantoprazole    Tablet 40 milliGRAM(s) Oral before breakfast  potassium chloride    Tablet ER 20 milliEquivalent(s) Oral daily  torsemide 40 milliGRAM(s) Oral two times a day    ROS:  Remains very sedentary  No fever chills  Cardiac  No cp sob or palp  Resp  no cough no mucus production  Gi  no abd pain no melana  Ext + edema    Vital Signs Last 24 Hrs  T(C): 36.8 (17 Feb 2019 10:13), Max: 37.1 (16 Feb 2019 21:36)  T(F): 98.3 (17 Feb 2019 10:13), Max: 98.7 (16 Feb 2019 21:36)  HR: 67 (17 Feb 2019 10:13) (66 - 75)  BP: 146/73 (17 Feb 2019 10:13) (139/76 - 171/88)  BP(mean): --  RR: 20 (17 Feb 2019 10:13) (18 - 20)  SpO2: 98% (17 Feb 2019 10:13) (98% - 100%)  T(C): 36.8 (02-17-19 @ 10:13), Max: 37.1 (02-16-19 @ 21:36)  HR: 67 (02-17-19 @ 10:13) (66 - 75)  BP: 146/73 (02-17-19 @ 10:13) (139/76 - 171/88)  RR: 20 (02-17-19 @ 10:13) (18 - 20)  SpO2: 98% (02-17-19 @ 10:13) (98% - 100%)    HEENT  CV  RESP  GI  EXT  NEURO [facility]  [address], [place] [address]    REASON FOR VISIT:  Follow up on chf  UPDATE:  Patient looks much better today.  More alert  moving in the bed  Na is starting to drift up    TELEMETRY MONITORING:  Sinus with pvcs        02-17    150<H>  |  105  |  30.0<H>  ----------------------------<  186<H>  3.8   |  37.0<H>  |  0.82    Ca    8.5<L>      17 Feb 2019 06:52  Mg     1.7     02-17          MEDICATIONS  (STANDING):  acetaminophen  IVPB .. 1000 milliGRAM(s) IV Intermittent once  ALBUTerol    0.083% 10 milliGRAM(s) Nebulizer once  amLODIPine   Tablet 5 milliGRAM(s) Oral daily  aspirin  chewable 81 milliGRAM(s) Oral daily  atorvastatin 80 milliGRAM(s) Oral at bedtime  carvedilol 25 milliGRAM(s) Oral every 12 hours  dextrose 5%. 1000 milliLiter(s) (50 mL/Hr) IV Continuous <Continuous>  a day  DULoxetine 60 milliGRAM(s) Oral daily  epoetin sara Injectable 64770 Unit(s) SubCutaneous <User Schedule>  ferrous sulfate Oral Tab/Cap - Peds 325 milliGRAM(s) Oral daily  gabapentin 100 milliGRAM(s) Oral three times a day  heparin  Injectable 5000 Unit(s) SubCutaneous every 8 hours  insulin lispro (HumaLOG) corrective regimen sliding scale   SubCutaneous three times a day before meals  isosorbide   mononitrate ER Tablet (IMDUR) 60 milliGRAM(s) Oral daily  losartan 25 milliGRAM(s) Oral daily  magnesium oxide 400 milliGRAM(s) Oral daily  pantoprazole    Tablet 40 milliGRAM(s) Oral before breakfast  potassium chloride    Tablet ER 20 milliEquivalent(s) Oral daily  torsemide 40 milliGRAM(s) Oral two times a day    ROS:  Remains very sedentary  No fever chills  Cardiac  No cp sob or palp  Resp  no cough no mucus production  Gi  no abd pain no melana  Ext + edema    Vital Signs Last 24 Hrs  T(C): 36.8 (17 Feb 2019 10:13), Max: 37.1 (16 Feb 2019 21:36)  T(F): 98.3 (17 Feb 2019 10:13), Max: 98.7 (16 Feb 2019 21:36)  HR: 67 (17 Feb 2019 10:13) (66 - 75)  BP: 146/73 (17 Feb 2019 10:13) (139/76 - 171/88)  BP(mean): --  RR: 20 (17 Feb 2019 10:13) (18 - 20)  SpO2: 98% (17 Feb 2019 10:13) (98% - 100%)  T(C): 36.8 (02-17-19 @ 10:13), Max: 37.1 (02-16-19 @ 21:36)  HR: 67 (02-17-19 @ 10:13) (66 - 75)  BP: 146/73 (02-17-19 @ 10:13) (139/76 - 171/88)  RR: 20 (02-17-19 @ 10:13) (18 - 20)  SpO2: 98% (02-17-19 @ 10:13) (98% - 100%)    Sitting in bed in nad  HEENT  opacity of right eye  o jvd  CV  s1&s2 reg  RESP Clear no rales or rhonchi  GI  Obese soft non tender  EXT  compression devices on  Pitting edema to pelvis  NEURO  Alert verbal oscar

## 2019-02-17 NOTE — PROGRESS NOTE ADULT - SUBJECTIVE AND OBJECTIVE BOX
PULMONARY PROGRESS NOTE      KUN ROCHA  MRN-[id number]    Patient is a 63y old  Female who presents with a chief complaint of SOB (2019 11:05)      INTERVAL HPI/OVERNIGHT EVENTS:    Patient is awake and alert  Feels better  OOB in chair  Less SOB; much improved    MEDICATIONS  (STANDING):  acetaminophen  IVPB .. 1000 milliGRAM(s) IV Intermittent once  ALBUTerol    0.083% 10 milliGRAM(s) Nebulizer once  amLODIPine   Tablet 5 milliGRAM(s) Oral daily  aspirin  chewable 81 milliGRAM(s) Oral daily  atorvastatin 80 milliGRAM(s) Oral at bedtime  carvedilol 25 milliGRAM(s) Oral every 12 hours  dextrose 5%. 1000 milliLiter(s) (50 mL/Hr) IV Continuous <Continuous>  dextrose 50% Injectable 12.5 Gram(s) IV Push once  dextrose 50% Injectable 25 Gram(s) IV Push once  dextrose 50% Injectable 25 Gram(s) IV Push once  docusate sodium 100 milliGRAM(s) Oral two times a day  DULoxetine 60 milliGRAM(s) Oral daily  epoetin sara Injectable 65225 Unit(s) SubCutaneous <User Schedule>  ferrous sulfate Oral Tab/Cap - Peds 325 milliGRAM(s) Oral daily  gabapentin 100 milliGRAM(s) Oral three times a day  heparin  Injectable 5000 Unit(s) SubCutaneous every 8 hours  insulin lispro (HumaLOG) corrective regimen sliding scale   SubCutaneous three times a day before meals  isosorbide   mononitrate ER Tablet (IMDUR) 60 milliGRAM(s) Oral daily  losartan 25 milliGRAM(s) Oral daily  magnesium oxide 400 milliGRAM(s) Oral daily  magnesium sulfate  IVPB 2 Gram(s) IV Intermittent once  pantoprazole    Tablet 40 milliGRAM(s) Oral before breakfast      MEDICATIONS  (PRN):  acetaminophen   Tablet .. 650 milliGRAM(s) Oral every 6 hours PRN Mild Pain (1 - 3)  dextrose 40% Gel 15 Gram(s) Oral once PRN Blood Glucose LESS THAN 70 milliGRAM(s)/deciliter  glucagon  Injectable 1 milliGRAM(s) IntraMuscular once PRN Glucose LESS THAN 70 milligrams/deciliter  ondansetron Injectable 4 milliGRAM(s) IV Push every 6 hours PRN Nausea and/or Vomiting      Allergies    Accupril (Other)    Intolerances        PAST MEDICAL & SURGICAL HISTORY:  Herniated disc, cervical  PAD (peripheral artery disease)  Legally blind  History of peripheral vascular disease  History of retinal detachment  History of CEA (carotid endarterectomy): Right  Hyperlipidemia  Glaucoma  Hypertension  Diabetes  S/P CABG x 1  After cataract  Uveitic glaucoma of both eyes  Detached retina  Atherosclerosis of right carotid artery   delivery delivered        REVIEW OF SYSTEMS:    CONSTITUTIONAL:  No distress    HEENT:  Eyes:  No diplopia or blurred vision. ENT:  No earache, sore throat or runny nose.    CARDIOVASCULAR:  No pressure, squeezing, tightness, heaviness or aching about the chest; no palpitations.    RESPIRATORY:  Improved cough, shortness of breath, PND and orthopnea. Mild SOBOE    GASTROINTESTINAL:  No nausea, vomiting or diarrhea.    GENITOURINARY:  No dysuria, frequency or urgency.    NEUROLOGIC:  No paresthesias, fasciculations, seizures or weakness.    PSYCHIATRIC:  No disorder of thought or mood.    Vital Signs Last 24 Hrs  T(C): 36.8 (2019 10:13), Max: 37.1 (2019 21:36)  T(F): 98.3 (2019 10:13), Max: 98.7 (2019 21:36)  HR: 67 (2019 10:14) (66 - 75)  BP: 136/70 (2019 10:14) (136/70 - 171/88)  BP(mean): --  RR: 18 (2019 10:14) (18 - 20)  SpO2: 97% (2019 10:14) (97% - 100%)    PHYSICAL EXAMINATION:    GENERAL: The patient is awake and alert in no apparent distress.     HEENT: Head is normocephalic and atraumatic. Extraocular muscles are intact. Mucous membranes are moist.    NECK: Supple.    LUNGS: Decreased BS b/l with mild bibasilar rales but no wheezing or rhonchi; respirations unlabored    HEART: Regular rate and rhythm without murmur.    ABDOMEN: Soft, nontender, and nondistended.      EXTREMITIES: Without any cyanosis, clubbing, rash, lesions or edema.    NEUROLOGIC: Grossly intact.    LABS:                        10.4   4.5   )-----------( 143      ( 2019 07:08 )             31.1     02-17    150<H>  |  105  |  30.0<H>  ----------------------------<  186<H>  3.8   |  37.0<H>  |  0.82    Ca    8.5<L>      2019 06:52  Mg     1.7               ABG - ( 15 Feb 2019 19:24 )  pH, Arterial: 7.47  pH, Blood: x     /  pCO2: 50    /  pO2: 142   / HCO3: 35    / Base Excess: 11.6  /  SaO2: 100           RADIOLOGY & ADDITIONAL STUDIES:       EXAM:  XR CHEST AP OR PA 1V                          PROCEDURE DATE:  2019          INTERPRETATION:  AP semierect chest on 2019 at 2:40 PM.   Patient has hypercapnia.    Poor inspiration crowds the chest. Heart suggest enlargement. Sternotomy   again noted.    There is a mild congestive picture in the lungs which may be slightly   increased from February 10.    IMPRESSION: CHF again noted, possibly somewhat increased from February 10.      TATE COX M.D., ATTENDING RADIOLOGIST  This document has been electronically signed. 2019  2:48PM      ECHO:      Summary:   1. Technically suboptimal study.   2. Left ventricular ejection fraction, by visual estimation, is >75%.   3. Hyperdynamic global left ventricular systolic function.   4. There is mild concentric left ventricular hypertrophy.   5. Mild mitral annular calcification.   6. Moderate mitral valve regurgitation.   7. Thickening and calcification of the anterior and posterior mitral   valve leaflets.   8. Moderate-severe tricuspid regurgitation.   9. Estimated pulmonary artery systolic pressure is 55.7 mmHg assuming a   right atrial pressure of 5 mmHg, which is consistent with moderate   pulmonary hypertension.  10. Endocardial visualization was enhanced with intravenous echo contrast.    [id number] Andrew Torres MD, Electronically signed on 2/15/2019 at 6:14:21 PM

## 2019-02-17 NOTE — PROGRESS NOTE ADULT - SUBJECTIVE AND OBJECTIVE BOX
NEPHROLOGY INTERVAL HPI/OVERNIGHT EVENTS:    Examined earlier    MEDICATIONS  (STANDING):  acetaminophen  IVPB .. 1000 milliGRAM(s) IV Intermittent once  ALBUTerol    0.083% 10 milliGRAM(s) Nebulizer once  amLODIPine   Tablet 5 milliGRAM(s) Oral daily  aspirin  chewable 81 milliGRAM(s) Oral daily  atorvastatin 80 milliGRAM(s) Oral at bedtime  carvedilol 25 milliGRAM(s) Oral every 12 hours  dextrose 5%. 1000 milliLiter(s) (50 mL/Hr) IV Continuous <Continuous>  dextrose 50% Injectable 12.5 Gram(s) IV Push once  dextrose 50% Injectable 25 Gram(s) IV Push once  dextrose 50% Injectable 25 Gram(s) IV Push once  docusate sodium 100 milliGRAM(s) Oral two times a day  DULoxetine 60 milliGRAM(s) Oral daily  epoetin sara Injectable 18933 Unit(s) SubCutaneous <User Schedule>  ferrous sulfate Oral Tab/Cap - Peds 325 milliGRAM(s) Oral daily  gabapentin 100 milliGRAM(s) Oral three times a day  heparin  Injectable 5000 Unit(s) SubCutaneous every 8 hours  insulin lispro (HumaLOG) corrective regimen sliding scale   SubCutaneous three times a day before meals  isosorbide   mononitrate ER Tablet (IMDUR) 60 milliGRAM(s) Oral daily  losartan 25 milliGRAM(s) Oral daily  magnesium oxide 400 milliGRAM(s) Oral daily  pantoprazole    Tablet 40 milliGRAM(s) Oral before breakfast  potassium chloride    Tablet ER 20 milliEquivalent(s) Oral daily  torsemide 40 milliGRAM(s) Oral two times a day    MEDICATIONS  (PRN):  acetaminophen   Tablet .. 650 milliGRAM(s) Oral every 6 hours PRN Mild Pain (1 - 3)  dextrose 40% Gel 15 Gram(s) Oral once PRN Blood Glucose LESS THAN 70 milliGRAM(s)/deciliter  glucagon  Injectable 1 milliGRAM(s) IntraMuscular once PRN Glucose LESS THAN 70 milligrams/deciliter      Allergies    Accupril (Other)    Intolerances        Vital Signs Last 24 Hrs  T(C): 36.4 (2019 05:15), Max: 37.1 (2019 21:36)  T(F): 97.5 (2019 05:15), Max: 98.7 (2019 21:36)  HR: 68 (2019 05:15) (66 - 75)  BP: 153/77 (2019 05:15) (139/76 - 171/88)  BP(mean): --  RR: 18 (2019 05:15) (18 - 20)  SpO2: 98% (2019 05:15) (98% - 100%)  Daily     Daily Weight in k.5 (2019 04:16)    PHYSICAL EXAM:  Comfortable  Cardiovascular: Normal S1 S2, No rub  Respiratory: Clear; diminished BS at bases	  Neuro: A & O x 3; non focal  Gastrointestinal:  Soft, Non-tender, + BS	  Extremities: trace LE edema    LABS:                        10.4   4.5   )-----------( 143      ( 2019 07:08 )             31.1         150<H>  |  105  |  30.0<H>  ----------------------------<  186<H>  3.8   |  37.0<H>  |  0.82    Ca    8.5<L>      2019 06:52  Mg     1.7               Magnesium, Serum: 1.7 mg/dL ( @ 06:52)    ABG - ( 15 Feb 2019 19:24 )  pH, Arterial: 7.47  pH, Blood: x     /  pCO2: 50    /  pO2: 142   / HCO3: 35    / Base Excess: 11.6  /  SaO2: 100                   RADIOLOGY & ADDITIONAL TESTS:

## 2019-02-17 NOTE — PROGRESS NOTE ADULT - PROBLEM SELECTOR PLAN 2
Patient has had multiple admission  unlikely she will be able to return home  will need SINGH  Pt consulted

## 2019-02-17 NOTE — PROGRESS NOTE ADULT - ASSESSMENT
Pt is a 62 yo presenting to ED w/ SOB 2/2 diastolic HF exacerbation and noted to have KEITH and hyperkalemia, PMH CAD s/p CABG and PCI, PAD s/p baloon angioplasty, DM2, HTN, HLD, hx of cardiac arrest, recent GI bleed last month, Diastolic CHF, legally blind, pleural effusion requiring thoracentesis in Dec 2018.     1) Acute on Chronic Diastolic Heart Failure  - Daily weight  - Strict intake/output  - Lasix Infusion switched to Demadex 20 mg BID (currently on hold due to hypernatremia likely secondary to overdiuresis)   - Losartan was restarted   - Cardiology input appreciated   - ECHO shows EF of > 75%. Moderate to Severe TR  2) Acute on Chronic Hypercapnic Respiratory Failure  - H/O OHS/KYREE  - Supposed to be on Trilogy at home as per Pulmonary Note from previous admission  - Continue NIV (AVAPS) Nocturnal and PRN  - ICU Consult appreciated.   - Pulmonary input appreciated  - Avoid Narcotics for pain  3) Acute on chronic kidney injury  - Improved  - Avoid nephrotoxic medications  - Renal input appreciated  4) Hyperkalemia  - Improved  5) CAD / PAD / HLD / HTN  - Continue Aspirin, Imdur, Lipitor, Coreg, Losartan and Norvasc  - Plavix on hold since last admission due to GI Bleed  6) Anemia  - Chronic  - H&H stable  - s/p Venofer   - Continue Procrit and Ferrous Sulfate 325 mg   7) Transaminitis  - Likely secondary to liver congestion  - Improved  8) History of Depression  - Continue Duloxetine 60 mg     DVT Prophylaxis -- IPC and Heparin 5000 Units    Dispo: SINGH

## 2019-02-17 NOTE — PROGRESS NOTE ADULT - SUBJECTIVE AND OBJECTIVE BOX
Heart failure    HPI:  Pt is a 62 yo presenting to ED w/ SOB, PMH CAD s/p CABG and PCI, PAD s/p baloon angioplasty, DM2, HTN, HLD, hx of cardiac arrest, recent GI bleed last month, Diastolic CHF, legally blind, pleural effusion requiring thoracentesis in Dec 2018.   Patient states that she has been having increasing SOB over the last week, she lives at home with her family, denies sick contacts, but has been having more exertional dyspnea. She states she walks with a walker but it is taking less activity for her dyspnea to occur. She has been compliant with all of her medications, she does not recall the names, but states that there have been no changes to her medication regimen since her discharge. She denies any associated chest pain or pressure. She has been taking her tosemide she believes. Denies PND or orthopnea, but she has noted increased LE edema.   Denies headaches, nausea, vomiting, chest pain, palpitations, abdominal pain, constipation, diarrhea, melena, hematochezia, dysuria.   Her SOB has since resolved. She is breathing comfortably, has no complaints.  In ED patient was noted to have hyperkalemia on repeat labs, troponin elevated at 0.09. Her creatinine is also elevated. She did not receive any potassium treatment initially so I ordered Calcium gluc, kayexelate, Insulin, dextrose, Albuterol. EKG without widened QRS or peaked T waves. (11 Feb 2019 03:58)    Interval History:  Patient was seen and examined at bedside around 9:15 am. Sitting in bed comfortably.    Denies chest pain, palpitations, shortness of breath, headache, dizziness, visual symptoms, nausea, vomiting or abdominal pain.    ROS:  As per interval history otherwise unremarkable.    PHYSICAL EXAM:  Vital Signs   T(C): 36.8 (17 Feb 2019 10:13), Max: 37.1 (16 Feb 2019 21:36)  T(F): 98.3 (17 Feb 2019 10:13), Max: 98.7 (16 Feb 2019 21:36)  HR: 67 (17 Feb 2019 10:14) (66 - 75)  BP: 136/70 (17 Feb 2019 10:14) (136/70 - 171/88)  RR: 18 (17 Feb 2019 10:14) (18 - 20)  SpO2: 97% (17 Feb 2019 10:14) (97% - 100%)  General: Well developed. Well nourished. No acute distress  HEENT: Opaque right cornea. EOMI. Clear conjunctivae. Moist mucus membrane  Neck: Supple. No JVD.   Chest: Decreased air entry at bases. No wheezing, rales or rhonchi.   Heart: Normal S1 & S2. RRR. No murmur.   Abdomen: Soft. Non-tender. Non-distended. + BS. Abdominal wall edema.   Ext: 2+ pedal edema. No calf tenderness. 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 Old healed scars on left leg.   Neuro: AAO x 3. No focal deficit. No speech disorder  Skin: Warm and Dry  Psychiatry: Normal mood and affect    MEDICATIONS  (STANDING):  acetaminophen  IVPB .. 1000 milliGRAM(s) IV Intermittent once  ALBUTerol    0.083% 10 milliGRAM(s) Nebulizer once  amLODIPine   Tablet 5 milliGRAM(s) Oral daily  aspirin  chewable 81 milliGRAM(s) Oral daily  atorvastatin 80 milliGRAM(s) Oral at bedtime  carvedilol 25 milliGRAM(s) Oral every 12 hours  dextrose 5%. 1000 milliLiter(s) (50 mL/Hr) IV Continuous <Continuous>  dextrose 50% Injectable 12.5 Gram(s) IV Push once  dextrose 50% Injectable 25 Gram(s) IV Push once  dextrose 50% Injectable 25 Gram(s) IV Push once  docusate sodium 100 milliGRAM(s) Oral two times a day  DULoxetine 60 milliGRAM(s) Oral daily  epoetin sara Injectable 52115 Unit(s) SubCutaneous <User Schedule>  ferrous sulfate Oral Tab/Cap - Peds 325 milliGRAM(s) Oral daily  gabapentin 100 milliGRAM(s) Oral three times a day  heparin  Injectable 5000 Unit(s) SubCutaneous every 8 hours  insulin lispro (HumaLOG) corrective regimen sliding scale   SubCutaneous three times a day before meals  isosorbide   mononitrate ER Tablet (IMDUR) 60 milliGRAM(s) Oral daily  losartan 25 milliGRAM(s) Oral daily  magnesium oxide 400 milliGRAM(s) Oral daily  magnesium sulfate  IVPB 2 Gram(s) IV Intermittent once  pantoprazole    Tablet 40 milliGRAM(s) Oral before breakfast    MEDICATIONS  (PRN):  acetaminophen   Tablet .. 650 milliGRAM(s) Oral every 6 hours PRN Mild Pain (1 - 3)  dextrose 40% Gel 15 Gram(s) Oral once PRN Blood Glucose LESS THAN 70 milliGRAM(s)/deciliter  glucagon  Injectable 1 milliGRAM(s) IntraMuscular once PRN Glucose LESS THAN 70 milligrams/deciliter  ondansetron Injectable 4 milliGRAM(s) IV Push every 6 hours PRN Nausea and/or Vomiting    LABS:  CAPILLARY BLOOD GLUCOSE  POCT Blood Glucose.: 186 mg/dL (17 Feb 2019 12:03)  POCT Blood Glucose.: 172 mg/dL (17 Feb 2019 08:07)  POCT Blood Glucose.: 222 mg/dL (16 Feb 2019 16:58)                        10.4   4.5   )-----------( 143      ( 16 Feb 2019 07:08 )             31.1     02-17    150<H>  |  105  |  30.0<H>  ----------------------------<  186<H>  3.8   |  37.0<H>  |  0.82    Ca    8.5<L>      17 Feb 2019 06:52  Mg     1.7     02-17      RADIOLOGY & ADDITIONAL STUDIES:  Reviewed

## 2019-02-17 NOTE — PROGRESS NOTE ADULT - ASSESSMENT
64y/o female with obesity, htn, hld, cad s/p cabg islas->LAD 5/2015), MADHAVI x2-> LCX & OM1   nst 11/18  c/w single vessel disease, right heart cath c/w: elevated pcwp. and severe pulmonary hypertension /rv  failure  who presents with sob.  Diuresed well with iv lasix

## 2019-02-18 LAB
ALBUMIN SERPL ELPH-MCNC: 3 G/DL — LOW (ref 3.3–5.2)
ALP SERPL-CCNC: 158 U/L — HIGH (ref 40–120)
ALT FLD-CCNC: 15 U/L — SIGNIFICANT CHANGE UP
ANION GAP SERPL CALC-SCNC: 7 MMOL/L — SIGNIFICANT CHANGE UP (ref 5–17)
AST SERPL-CCNC: 22 U/L — SIGNIFICANT CHANGE UP
BILIRUB DIRECT SERPL-MCNC: 0.4 MG/DL — HIGH (ref 0–0.3)
BILIRUB INDIRECT FLD-MCNC: 0.6 MG/DL — SIGNIFICANT CHANGE UP (ref 0.2–1)
BILIRUB SERPL-MCNC: 1 MG/DL — SIGNIFICANT CHANGE UP (ref 0.4–2)
BUN SERPL-MCNC: 29 MG/DL — HIGH (ref 8–20)
CALCIUM SERPL-MCNC: 8.5 MG/DL — LOW (ref 8.6–10.2)
CHLORIDE SERPL-SCNC: 101 MMOL/L — SIGNIFICANT CHANGE UP (ref 98–107)
CO2 SERPL-SCNC: 38 MMOL/L — HIGH (ref 22–29)
CREAT SERPL-MCNC: 0.88 MG/DL — SIGNIFICANT CHANGE UP (ref 0.5–1.3)
GLUCOSE BLDC GLUCOMTR-MCNC: 160 MG/DL — HIGH (ref 70–99)
GLUCOSE BLDC GLUCOMTR-MCNC: 219 MG/DL — HIGH (ref 70–99)
GLUCOSE BLDC GLUCOMTR-MCNC: 234 MG/DL — HIGH (ref 70–99)
GLUCOSE SERPL-MCNC: 167 MG/DL — HIGH (ref 70–115)
MAGNESIUM SERPL-MCNC: 2.1 MG/DL — SIGNIFICANT CHANGE UP (ref 1.6–2.6)
POTASSIUM SERPL-MCNC: 4.8 MMOL/L — SIGNIFICANT CHANGE UP (ref 3.5–5.3)
POTASSIUM SERPL-SCNC: 4.8 MMOL/L — SIGNIFICANT CHANGE UP (ref 3.5–5.3)
PROT SERPL-MCNC: 7 G/DL — SIGNIFICANT CHANGE UP (ref 6.6–8.7)
SODIUM SERPL-SCNC: 146 MMOL/L — HIGH (ref 135–145)

## 2019-02-18 PROCEDURE — 99232 SBSQ HOSP IP/OBS MODERATE 35: CPT

## 2019-02-18 RX ORDER — AMLODIPINE BESYLATE 2.5 MG/1
5 TABLET ORAL ONCE
Qty: 0 | Refills: 0 | Status: COMPLETED | OUTPATIENT
Start: 2019-02-18 | End: 2019-02-18

## 2019-02-18 RX ORDER — INSULIN GLARGINE 100 [IU]/ML
5 INJECTION, SOLUTION SUBCUTANEOUS AT BEDTIME
Qty: 0 | Refills: 0 | Status: DISCONTINUED | OUTPATIENT
Start: 2019-02-18 | End: 2019-02-18

## 2019-02-18 RX ORDER — AMLODIPINE BESYLATE 2.5 MG/1
10 TABLET ORAL DAILY
Qty: 0 | Refills: 0 | Status: DISCONTINUED | OUTPATIENT
Start: 2019-02-19 | End: 2019-03-09

## 2019-02-18 RX ADMIN — HEPARIN SODIUM 5000 UNIT(S): 5000 INJECTION INTRAVENOUS; SUBCUTANEOUS at 05:25

## 2019-02-18 RX ADMIN — Medication 100 MILLIGRAM(S): at 05:26

## 2019-02-18 RX ADMIN — HEPARIN SODIUM 5000 UNIT(S): 5000 INJECTION INTRAVENOUS; SUBCUTANEOUS at 22:56

## 2019-02-18 RX ADMIN — GABAPENTIN 100 MILLIGRAM(S): 400 CAPSULE ORAL at 05:26

## 2019-02-18 RX ADMIN — CARVEDILOL PHOSPHATE 25 MILLIGRAM(S): 80 CAPSULE, EXTENDED RELEASE ORAL at 05:26

## 2019-02-18 RX ADMIN — MAGNESIUM OXIDE 400 MG ORAL TABLET 400 MILLIGRAM(S): 241.3 TABLET ORAL at 08:50

## 2019-02-18 RX ADMIN — Medication 2: at 12:40

## 2019-02-18 RX ADMIN — Medication 1: at 08:50

## 2019-02-18 RX ADMIN — Medication 2: at 17:40

## 2019-02-18 RX ADMIN — ATORVASTATIN CALCIUM 80 MILLIGRAM(S): 80 TABLET, FILM COATED ORAL at 22:56

## 2019-02-18 RX ADMIN — DULOXETINE HYDROCHLORIDE 60 MILLIGRAM(S): 30 CAPSULE, DELAYED RELEASE ORAL at 08:51

## 2019-02-18 RX ADMIN — CARVEDILOL PHOSPHATE 25 MILLIGRAM(S): 80 CAPSULE, EXTENDED RELEASE ORAL at 17:40

## 2019-02-18 RX ADMIN — Medication 100 MILLIGRAM(S): at 16:15

## 2019-02-18 RX ADMIN — HEPARIN SODIUM 5000 UNIT(S): 5000 INJECTION INTRAVENOUS; SUBCUTANEOUS at 13:48

## 2019-02-18 RX ADMIN — PANTOPRAZOLE SODIUM 40 MILLIGRAM(S): 20 TABLET, DELAYED RELEASE ORAL at 05:25

## 2019-02-18 RX ADMIN — AMLODIPINE BESYLATE 5 MILLIGRAM(S): 2.5 TABLET ORAL at 05:26

## 2019-02-18 RX ADMIN — LOSARTAN POTASSIUM 25 MILLIGRAM(S): 100 TABLET, FILM COATED ORAL at 05:26

## 2019-02-18 RX ADMIN — AMLODIPINE BESYLATE 5 MILLIGRAM(S): 2.5 TABLET ORAL at 08:50

## 2019-02-18 RX ADMIN — ISOSORBIDE MONONITRATE 60 MILLIGRAM(S): 60 TABLET, EXTENDED RELEASE ORAL at 08:51

## 2019-02-18 RX ADMIN — Medication 81 MILLIGRAM(S): at 08:51

## 2019-02-18 RX ADMIN — GABAPENTIN 100 MILLIGRAM(S): 400 CAPSULE ORAL at 13:47

## 2019-02-18 RX ADMIN — GABAPENTIN 100 MILLIGRAM(S): 400 CAPSULE ORAL at 22:56

## 2019-02-18 RX ADMIN — Medication 325 MILLIGRAM(S): at 08:51

## 2019-02-18 NOTE — PROGRESS NOTE ADULT - SUBJECTIVE AND OBJECTIVE BOX
NEPHROLOGY INTERVAL HPI/OVERNIGHT EVENTS:    Examined earlier    MEDICATIONS  (STANDING):  acetaminophen  IVPB .. 1000 milliGRAM(s) IV Intermittent once  ALBUTerol    0.083% 10 milliGRAM(s) Nebulizer once  aspirin  chewable 81 milliGRAM(s) Oral daily  atorvastatin 80 milliGRAM(s) Oral at bedtime  carvedilol 25 milliGRAM(s) Oral every 12 hours  dextrose 5%. 1000 milliLiter(s) (50 mL/Hr) IV Continuous <Continuous>  dextrose 50% Injectable 12.5 Gram(s) IV Push once  dextrose 50% Injectable 25 Gram(s) IV Push once  dextrose 50% Injectable 25 Gram(s) IV Push once  docusate sodium 100 milliGRAM(s) Oral two times a day  DULoxetine 60 milliGRAM(s) Oral daily  epoetin sara Injectable 29523 Unit(s) SubCutaneous <User Schedule>  ferrous sulfate Oral Tab/Cap - Peds 325 milliGRAM(s) Oral daily  gabapentin 100 milliGRAM(s) Oral three times a day  heparin  Injectable 5000 Unit(s) SubCutaneous every 8 hours  insulin lispro (HumaLOG) corrective regimen sliding scale   SubCutaneous three times a day before meals  isosorbide   mononitrate ER Tablet (IMDUR) 60 milliGRAM(s) Oral daily  losartan 25 milliGRAM(s) Oral daily  magnesium oxide 400 milliGRAM(s) Oral daily  pantoprazole    Tablet 40 milliGRAM(s) Oral before breakfast    MEDICATIONS  (PRN):  acetaminophen   Tablet .. 650 milliGRAM(s) Oral every 6 hours PRN Mild Pain (1 - 3)  dextrose 40% Gel 15 Gram(s) Oral once PRN Blood Glucose LESS THAN 70 milliGRAM(s)/deciliter  glucagon  Injectable 1 milliGRAM(s) IntraMuscular once PRN Glucose LESS THAN 70 milligrams/deciliter  ondansetron Injectable 4 milliGRAM(s) IV Push every 6 hours PRN Nausea and/or Vomiting      Allergies    Accupril (Other)    Intolerances        Vital Signs Last 24 Hrs  T(C): 36.8 (2019 10:31), Max: 36.9 (2019 21:45)  T(F): 98.2 (2019 10:31), Max: 98.4 (2019 21:45)  HR: 89 (2019 10:31) (61 - 89)  BP: 129/56 (2019 10:31) (122/68 - 184/84)  BP(mean): --  RR: 18 (2019 10:31) (16 - 18)  SpO2: 100% (2019 10:31) (99% - 100%)  Daily     Daily Weight in k.8 (2019 07:01)    PHYSICAL EXAM:  Comfortable  Cardiovascular: Normal S1 S2, No rub  Respiratory: Clear; diminished BS at bases	  Neuro: A & O x 3; non focal  Gastrointestinal:  Soft, Non-tender, + BS	  Extremities: trace LE edema      LABS:        146<H>  |  101  |  29.0<H>  ----------------------------<  167<H>  4.8   |  38.0<H>  |  0.88    Ca    8.5<L>      2019 06:15  Mg     2.1         TPro  7.0  /  Alb  3.0<L>  /  TBili  1.0  /  DBili  0.4<H>  /  AST  22  /  ALT  15  /  AlkPhos  158<H>          Magnesium, Serum: 2.1 mg/dL ( @ 06:15)          RADIOLOGY & ADDITIONAL TESTS:

## 2019-02-18 NOTE — PROGRESS NOTE ADULT - ASSESSMENT
KEITH: improved  decompensated CHF (R > L)  cr better  - off diuretics briefly, serum Na better would hold diuretics one more day   - Na better  - monitor labs  - urine prot: creat 1.1 gm proteinuria    Anemia:   - cont IV Fe  - added QUENTIN  - trend H/H; target Hgb=10.0    Will follow

## 2019-02-18 NOTE — PROGRESS NOTE ADULT - SUBJECTIVE AND OBJECTIVE BOX
Heart failure    HPI:  Pt is a 62 yo presenting to ED w/ SOB, PMH CAD s/p CABG and PCI, PAD s/p baloon angioplasty, DM2, HTN, HLD, hx of cardiac arrest, recent GI bleed last month, Diastolic CHF, legally blind, pleural effusion requiring thoracentesis in Dec 2018.   Patient states that she has been having increasing SOB over the last week, she lives at home with her family, denies sick contacts, but has been having more exertional dyspnea. She states she walks with a walker but it is taking less activity for her dyspnea to occur. She has been compliant with all of her medications, she does not recall the names, but states that there have been no changes to her medication regimen since her discharge. She denies any associated chest pain or pressure. She has been taking her tosemide she believes. Denies PND or orthopnea, but she has noted increased LE edema.   Denies headaches, nausea, vomiting, chest pain, palpitations, abdominal pain, constipation, diarrhea, melena, hematochezia, dysuria.   Her SOB has since resolved. She is breathing comfortably, has no complaints.  In ED patient was noted to have hyperkalemia on repeat labs, troponin elevated at 0.09. Her creatinine is also elevated. She did not receive any potassium treatment initially so I ordered Calcium gluc, kayexelate, Insulin, dextrose, Albuterol. EKG without widened QRS or peaked T waves. (11 Feb 2019 03:58)    Interval History:  Patient was seen and examined at bedside around 8 am. Feeling better.     Denies chest pain, palpitations, shortness of breath, headache, dizziness, visual symptoms, nausea, vomiting or abdominal pain.    ROS:  As per interval history otherwise unremarkable.    PHYSICAL EXAM:  Vital Signs   T(C): 36.7 (18 Feb 2019 08:48), Max: 36.9 (17 Feb 2019 21:45)  T(F): 98 (18 Feb 2019 08:48), Max: 98.4 (17 Feb 2019 21:45)  HR: 65 (18 Feb 2019 08:48) (61 - 88)  BP: 150/71 (18 Feb 2019 08:48) (122/68 - 184/84)  RR: 16 (18 Feb 2019 08:48) (16 - 18)  SpO2: 100% (18 Feb 2019 08:48) (99% - 100%)  General: Well developed. Well nourished. No acute distress  HEENT: Opaque right cornea. EOMI. Clear conjunctivae. Moist mucus membrane  MEDICATIONS  (STANDING):  acetaminophen  IVPB .. 1000 milliGRAM(s) IV Intermittent once  ALBUTerol    0.083% 10 milliGRAM(s) Nebulizer once  aspirin  chewable 81 milliGRAM(s) Oral daily  atorvastatin 80 milliGRAM(s) Oral at bedtime  carvedilol 25 milliGRAM(s) Oral every 12 hours  dextrose 5%. 1000 milliLiter(s) (50 mL/Hr) IV Continuous <Continuous>  dextrose 50% Injectable 12.5 Gram(s) IV Push once  dextrose 50% Injectable 25 Gram(s) IV Push once  dextrose 50% Injectable 25 Gram(s) IV Push once  docusate sodium 100 milliGRAM(s) Oral two times a day  DULoxetine 60 milliGRAM(s) Oral daily  epoetin sara Injectable 01788 Unit(s) SubCutaneous <User Schedule>  ferrous sulfate Oral Tab/Cap - Peds 325 milliGRAM(s) Oral daily  gabapentin 100 milliGRAM(s) Oral three times a day  heparin  Injectable 5000 Unit(s) SubCutaneous every 8 hours  insulin lispro (HumaLOG) corrective regimen sliding scale   SubCutaneous three times a day before meals  isosorbide   mononitrate ER Tablet (IMDUR) 60 milliGRAM(s) Oral daily  losartan 25 milliGRAM(s) Oral daily  magnesium oxide 400 milliGRAM(s) Oral daily  pantoprazole    Tablet 40 milliGRAM(s) Oral before breakfast    MEDICATIONS  (PRN):  acetaminophen   Tablet .. 650 milliGRAM(s) Oral every 6 hours PRN Mild Pain (1 - 3)  dextrose 40% Gel 15 Gram(s) Oral once PRN Blood Glucose LESS THAN 70 milliGRAM(s)/deciliter  glucagon  Injectable 1 milliGRAM(s) IntraMuscular once PRN Glucose LESS THAN 70 milligrams/deciliter  ondansetron Injectable 4 milliGRAM(s) IV Push every 6 hours PRN Nausea and/or Vomiting  Neck: Supple. No JVD.   Chest: Decreased air entry at bases. No wheezing, rales or rhonchi.   Heart: Normal S1 & S2. RRR. No murmur.   Abdomen: Soft. Non-tender. Non-distended. + BS. Abdominal wall edema.   Ext: 2+ pedal edema. No calf tenderness. 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 Old healed scars on left leg.   Neuro: AAO x 3. No focal deficit. No speech disorder  Skin: Warm and Dry  Psychiatry: Normal mood and affect    LABS:  CAPILLARY BLOOD GLUCOSE  POCT Blood Glucose.: 160 mg/dL (18 Feb 2019 08:36)  POCT Blood Glucose.: 258 mg/dL (17 Feb 2019 22:11)  POCT Blood Glucose.: 198 mg/dL (17 Feb 2019 17:19)  POCT Blood Glucose.: 186 mg/dL (17 Feb 2019 12:03)    02-18    146<H>  |  101  |  29.0<H>  ----------------------------<  167<H>  4.8   |  38.0<H>  |  0.88    Ca    8.5<L>      18 Feb 2019 06:15  Mg     2.1     02-18    TPro  7.0  /  Alb  3.0<L>  /  TBili  1.0  /  DBili  0.4<H>  /  AST  22  /  ALT  15  /  AlkPhos  158<H>  02-18      RADIOLOGY & ADDITIONAL STUDIES:  Reviewed Heart failure    HPI:  Pt is a 62 yo presenting to ED w/ SOB, PMH CAD s/p CABG and PCI, PAD s/p baloon angioplasty, DM2, HTN, HLD, hx of cardiac arrest, recent GI bleed last month, Diastolic CHF, legally blind, pleural effusion requiring thoracentesis in Dec 2018.   Patient states that she has been having increasing SOB over the last week, she lives at home with her family, denies sick contacts, but has been having more exertional dyspnea. She states she walks with a walker but it is taking less activity for her dyspnea to occur. She has been compliant with all of her medications, she does not recall the names, but states that there have been no changes to her medication regimen since her discharge. She denies any associated chest pain or pressure. She has been taking her tosemide she believes. Denies PND or orthopnea, but she has noted increased LE edema.   Denies headaches, nausea, vomiting, chest pain, palpitations, abdominal pain, constipation, diarrhea, melena, hematochezia, dysuria.   Her SOB has since resolved. She is breathing comfortably, has no complaints.  In ED patient was noted to have hyperkalemia on repeat labs, troponin elevated at 0.09. Her creatinine is also elevated. She did not receive any potassium treatment initially so I ordered Calcium gluc, kayexelate, Insulin, dextrose, Albuterol. EKG without widened QRS or peaked T waves. (11 Feb 2019 03:58)    Interval History:  Patient was seen and examined at bedside around 8 am. Feeling better.     Denies chest pain, palpitations, shortness of breath, headache, dizziness, visual symptoms, nausea, vomiting or abdominal pain.    ROS:  As per interval history otherwise unremarkable.    PHYSICAL EXAM:  Vital Signs   T(C): 36.7 (18 Feb 2019 08:48), Max: 36.9 (17 Feb 2019 21:45)  T(F): 98 (18 Feb 2019 08:48), Max: 98.4 (17 Feb 2019 21:45)  HR: 65 (18 Feb 2019 08:48) (61 - 88)  BP: 150/71 (18 Feb 2019 08:48) (122/68 - 184/84)  RR: 16 (18 Feb 2019 08:48) (16 - 18)  SpO2: 100% (18 Feb 2019 08:48) (99% - 100%)  General: Well developed. Well nourished. No acute distress  HEENT: Opaque right cornea. EOMI. Clear conjunctivae. Moist mucus membrane  Neck: Supple. No JVD.   Chest: Decreased air entry at bases. No wheezing, rales or rhonchi.   Heart: Normal S1 & S2. RRR. No murmur.   Abdomen: Soft. Non-tender. Non-distended. + BS. Abdominal wall edema.   Ext: 2+ pedal edema. No calf tenderness. 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 Old healed scars on left leg.   Neuro: AAO x 3. No focal deficit. No speech disorder  Skin: Warm and Dry  Psychiatry: Normal mood and affect    MEDICATIONS  (STANDING):  acetaminophen  IVPB .. 1000 milliGRAM(s) IV Intermittent once  ALBUTerol    0.083% 10 milliGRAM(s) Nebulizer once  aspirin  chewable 81 milliGRAM(s) Oral daily  atorvastatin 80 milliGRAM(s) Oral at bedtime  carvedilol 25 milliGRAM(s) Oral every 12 hours  dextrose 5%. 1000 milliLiter(s) (50 mL/Hr) IV Continuous <Continuous>  dextrose 50% Injectable 12.5 Gram(s) IV Push once  dextrose 50% Injectable 25 Gram(s) IV Push once  dextrose 50% Injectable 25 Gram(s) IV Push once  docusate sodium 100 milliGRAM(s) Oral two times a day  DULoxetine 60 milliGRAM(s) Oral daily  epoetin sara Injectable 12380 Unit(s) SubCutaneous <User Schedule>  ferrous sulfate Oral Tab/Cap - Peds 325 milliGRAM(s) Oral daily  gabapentin 100 milliGRAM(s) Oral three times a day  heparin  Injectable 5000 Unit(s) SubCutaneous every 8 hours  insulin lispro (HumaLOG) corrective regimen sliding scale   SubCutaneous three times a day before meals  isosorbide   mononitrate ER Tablet (IMDUR) 60 milliGRAM(s) Oral daily  losartan 25 milliGRAM(s) Oral daily  magnesium oxide 400 milliGRAM(s) Oral daily  pantoprazole    Tablet 40 milliGRAM(s) Oral before breakfast    MEDICATIONS  (PRN):  acetaminophen   Tablet .. 650 milliGRAM(s) Oral every 6 hours PRN Mild Pain (1 - 3)  dextrose 40% Gel 15 Gram(s) Oral once PRN Blood Glucose LESS THAN 70 milliGRAM(s)/deciliter  glucagon  Injectable 1 milliGRAM(s) IntraMuscular once PRN Glucose LESS THAN 70 milligrams/deciliter  ondansetron Injectable 4 milliGRAM(s) IV Push every 6 hours PRN Nausea and/or Vomiting    LABS:  CAPILLARY BLOOD GLUCOSE  POCT Blood Glucose.: 160 mg/dL (18 Feb 2019 08:36)  POCT Blood Glucose.: 258 mg/dL (17 Feb 2019 22:11)  POCT Blood Glucose.: 198 mg/dL (17 Feb 2019 17:19)  POCT Blood Glucose.: 186 mg/dL (17 Feb 2019 12:03)    02-18    146<H>  |  101  |  29.0<H>  ----------------------------<  167<H>  4.8   |  38.0<H>  |  0.88    Ca    8.5<L>      18 Feb 2019 06:15  Mg     2.1     02-18    TPro  7.0  /  Alb  3.0<L>  /  TBili  1.0  /  DBili  0.4<H>  /  AST  22  /  ALT  15  /  AlkPhos  158<H>  02-18      RADIOLOGY & ADDITIONAL STUDIES:  Reviewed

## 2019-02-18 NOTE — PROGRESS NOTE ADULT - ASSESSMENT
Pt is a 64 yo presenting to ED w/ SOB 2/2 diastolic HF exacerbation and noted to have KEITH and hyperkalemia, PMH CAD s/p CABG and PCI, PAD s/p baloon angioplasty, DM2, HTN, HLD, hx of cardiac arrest, recent GI bleed last month, Diastolic CHF, legally blind, pleural effusion requiring thoracentesis in Dec 2018.     1) Acute on Chronic Diastolic Heart Failure  - Daily weight  - Strict intake/output  - Lasix Infusion switched to Demadex 20 mg BID (currently on hold due to hypernatremia likely secondary to overdiuresis)   - Losartan was restarted   - Cardiology input appreciated   - ECHO shows EF of > 75%. Moderate to Severe TR  2) Acute on Chronic Hypercapnic Respiratory Failure  - H/O OHS/KYREE  - Supposed to be on Trilogy at home as per Pulmonary Note from previous admission  - Continue NIV (AVAPS) Nocturnal and PRN  - ICU Consult appreciated.   - Pulmonary input appreciated  - Avoid Narcotics for pain  3) Acute on chronic kidney injury  - Improved  - Avoid nephrotoxic medications  - Renal input appreciated  4) Hyperkalemia  - Improved  5) CAD / PAD / HLD / HTN  - Continue Aspirin, Imdur, Lipitor, Coreg, Losartan and Norvasc (increased to 10 mg this am)  - Plavix on hold since last admission due to GI Bleed  6) Anemia  - Chronic  - H&H stable  - s/p Venofer   - Continue Procrit and Ferrous Sulfate 325 mg   7) Transaminitis  - Likely secondary to liver congestion  - Improved  8) History of Depression  - Continue Duloxetine 60 mg     DVT Prophylaxis -- IPC and Heparin 5000 Units    Dispo: [facility] in 24-48 hours Pt is a 62 yo presenting to ED w/ SOB 2/2 diastolic HF exacerbation and noted to have KEITH and hyperkalemia, PMH CAD s/p CABG and PCI, PAD s/p baloon angioplasty, DM2, HTN, HLD, hx of cardiac arrest, recent GI bleed last month, Diastolic CHF, legally blind, pleural effusion requiring thoracentesis in Dec 2018.     1) Acute on Chronic Diastolic Heart Failure  - Daily weight  - Strict intake/output  - Lasix Infusion switched to Demadex 20 mg BID (currently on hold due to hypernatremia likely secondary to overdiuresis)   - Losartan was restarted   - Cardiology input appreciated   - ECHO shows EF of > 75%. Moderate to Severe TR  2) Acute on Chronic Hypercapnic Respiratory Failure  - H/O OHS/KYREE  - Supposed to be on Trilogy at home as per Pulmonary Note from previous admission  - Continue NIV (AVAPS) Nocturnal and PRN  - ICU Consult appreciated.   - Pulmonary input appreciated  - Avoid Narcotics for pain  3) Acute on chronic kidney injury  - Improved  - Avoid nephrotoxic medications  - Renal input appreciated  4) Hyperkalemia  - Improved  5) CAD / PAD / HLD / HTN  - Continue Aspirin, Imdur, Lipitor, Coreg, Losartan and Norvasc (increased to 10 mg this am)  - Plavix on hold since last admission due to GI Bleed  6) DM 2  - HbA1c 5.6  - Accu checks and ISS  7) Anemia  - Chronic  - H&H stable  - s/p Venofer   - Continue Procrit and Ferrous Sulfate 325 mg   8) Transaminitis  - Likely secondary to liver congestion  - Improved  9) History of Depression  - Continue Duloxetine 60 mg     DVT Prophylaxis -- IPC and Heparin 5000 Units    Dispo: [facility] in 24-48 hours

## 2019-02-18 NOTE — PROGRESS NOTE ADULT - PROBLEM SELECTOR PLAN 1
Improved volume status.   Agree with holding diuretics today. If improving hyponatremia, start torsemide 20 mg po bid tomorrow.

## 2019-02-18 NOTE — PROGRESS NOTE ADULT - SUBJECTIVE AND OBJECTIVE BOX
[facility]                                                        Office: [address] [address]                                                       Telephone: [phone]. Fax: [phone]      CC:     INTERVAL HISTORY:    MEDICATIONS  (STANDING):  acetaminophen  IVPB .. 1000 milliGRAM(s) IV Intermittent once  ALBUTerol    0.083% 10 milliGRAM(s) Nebulizer once  aspirin  chewable 81 milliGRAM(s) Oral daily  atorvastatin 80 milliGRAM(s) Oral at bedtime  carvedilol 25 milliGRAM(s) Oral every 12 hours  dextrose 5%. 1000 milliLiter(s) (50 mL/Hr) IV Continuous <Continuous>  dextrose 50% Injectable 12.5 Gram(s) IV Push once  dextrose 50% Injectable 25 Gram(s) IV Push once  dextrose 50% Injectable 25 Gram(s) IV Push once  docusate sodium 100 milliGRAM(s) Oral two times a day  DULoxetine 60 milliGRAM(s) Oral daily  epoetin sara Injectable 59775 Unit(s) SubCutaneous <User Schedule>  ferrous sulfate Oral Tab/Cap - Peds 325 milliGRAM(s) Oral daily  gabapentin 100 milliGRAM(s) Oral three times a day  heparin  Injectable 5000 Unit(s) SubCutaneous every 8 hours  insulin lispro (HumaLOG) corrective regimen sliding scale   SubCutaneous three times a day before meals  isosorbide   mononitrate ER Tablet (IMDUR) 60 milliGRAM(s) Oral daily  losartan 25 milliGRAM(s) Oral daily  magnesium oxide 400 milliGRAM(s) Oral daily  pantoprazole    Tablet 40 milliGRAM(s) Oral before breakfast    ROS: All others negative     PHYSICAL EXAM:  T(C): 36.8 (02-18-19 @ 10:31), Max: 36.9 (02-17-19 @ 21:45)  HR: 89 (02-18-19 @ 10:31) (61 - 89)  BP: 129/56 (02-18-19 @ 10:31) (122/68 - 184/84)  RR: 18 (02-18-19 @ 10:31) (16 - 18)  SpO2: 100% (02-18-19 @ 10:31) (99% - 100%)  Wt(kg): --  I&O's Summary    17 Feb 2019 07:01 - 18 Feb 2019 07:00  --------------------------------------------------------  IN: 360 mL / OUT: 1300 mL / NET: -940 mL    18 Feb 2019 07:01  -  18 Feb 2019 16:06  --------------------------------------------------------  IN: 480 mL / OUT: 0 mL / NET: 480 mL      Appearance: Normal	  HEENT:   blind in right eye.   Lymphatic: No lymphadenopathy  Cardiovascular: Normal S1 S2, No JVD, No murmurs, No edema  Respiratory: Lungs clear to auscultation	  Psychiatry: A & O x 3, Mood & affect appropriate  Gastrointestinal:  Soft, Non-tender, + BS	  Skin: No rashes, No ecchymoses, No cyanosis  Neurologic: Non-focal  Extremities: limited ROM.   Vascular: Peripheral pulses palpable 2+ bilaterally    TELEMETRY: 	      LABS:	 	    02-18    146<H>  |  101  |  29.0<H>  ----------------------------<  167<H>  4.8   |  38.0<H>  |  0.88    Ca    8.5<L>      18 Feb 2019 06:15  Mg     2.1     02-18    TPro  7.0  /  Alb  3.0<L>  /  TBili  1.0  /  DBili  0.4<H>  /  AST  22  /  ALT  15  /  AlkPhos  158<H>  02-18

## 2019-02-19 LAB
ANION GAP SERPL CALC-SCNC: 8 MMOL/L — SIGNIFICANT CHANGE UP (ref 5–17)
BUN SERPL-MCNC: 34 MG/DL — HIGH (ref 8–20)
CALCIUM SERPL-MCNC: 8 MG/DL — LOW (ref 8.6–10.2)
CHLORIDE SERPL-SCNC: 100 MMOL/L — SIGNIFICANT CHANGE UP (ref 98–107)
CO2 SERPL-SCNC: 36 MMOL/L — HIGH (ref 22–29)
CREAT SERPL-MCNC: 1.08 MG/DL — SIGNIFICANT CHANGE UP (ref 0.5–1.3)
GLUCOSE BLDC GLUCOMTR-MCNC: 147 MG/DL — HIGH (ref 70–99)
GLUCOSE BLDC GLUCOMTR-MCNC: 214 MG/DL — HIGH (ref 70–99)
GLUCOSE BLDC GLUCOMTR-MCNC: 274 MG/DL — HIGH (ref 70–99)
GLUCOSE BLDC GLUCOMTR-MCNC: 276 MG/DL — HIGH (ref 70–99)
GLUCOSE SERPL-MCNC: 153 MG/DL — HIGH (ref 70–115)
MAGNESIUM SERPL-MCNC: 2.2 MG/DL — SIGNIFICANT CHANGE UP (ref 1.8–2.6)
POTASSIUM SERPL-MCNC: 4.8 MMOL/L — SIGNIFICANT CHANGE UP (ref 3.5–5.3)
POTASSIUM SERPL-SCNC: 4.8 MMOL/L — SIGNIFICANT CHANGE UP (ref 3.5–5.3)
SODIUM SERPL-SCNC: 144 MMOL/L — SIGNIFICANT CHANGE UP (ref 135–145)

## 2019-02-19 PROCEDURE — 99232 SBSQ HOSP IP/OBS MODERATE 35: CPT

## 2019-02-19 RX ORDER — INSULIN GLARGINE 100 [IU]/ML
5 INJECTION, SOLUTION SUBCUTANEOUS AT BEDTIME
Qty: 0 | Refills: 0 | Status: DISCONTINUED | OUTPATIENT
Start: 2019-02-19 | End: 2019-02-22

## 2019-02-19 RX ADMIN — MAGNESIUM OXIDE 400 MG ORAL TABLET 400 MILLIGRAM(S): 241.3 TABLET ORAL at 10:51

## 2019-02-19 RX ADMIN — DULOXETINE HYDROCHLORIDE 60 MILLIGRAM(S): 30 CAPSULE, DELAYED RELEASE ORAL at 10:52

## 2019-02-19 RX ADMIN — LOSARTAN POTASSIUM 25 MILLIGRAM(S): 100 TABLET, FILM COATED ORAL at 05:56

## 2019-02-19 RX ADMIN — Medication 100 MILLIGRAM(S): at 05:56

## 2019-02-19 RX ADMIN — Medication 81 MILLIGRAM(S): at 10:51

## 2019-02-19 RX ADMIN — Medication 100 MILLIGRAM(S): at 17:14

## 2019-02-19 RX ADMIN — GABAPENTIN 100 MILLIGRAM(S): 400 CAPSULE ORAL at 05:56

## 2019-02-19 RX ADMIN — GABAPENTIN 100 MILLIGRAM(S): 400 CAPSULE ORAL at 23:03

## 2019-02-19 RX ADMIN — Medication 325 MILLIGRAM(S): at 10:51

## 2019-02-19 RX ADMIN — Medication 3: at 17:14

## 2019-02-19 RX ADMIN — ATORVASTATIN CALCIUM 80 MILLIGRAM(S): 80 TABLET, FILM COATED ORAL at 23:03

## 2019-02-19 RX ADMIN — HEPARIN SODIUM 5000 UNIT(S): 5000 INJECTION INTRAVENOUS; SUBCUTANEOUS at 23:03

## 2019-02-19 RX ADMIN — Medication 20 MILLIGRAM(S): at 17:14

## 2019-02-19 RX ADMIN — ISOSORBIDE MONONITRATE 60 MILLIGRAM(S): 60 TABLET, EXTENDED RELEASE ORAL at 10:51

## 2019-02-19 RX ADMIN — Medication 2: at 12:49

## 2019-02-19 RX ADMIN — PANTOPRAZOLE SODIUM 40 MILLIGRAM(S): 20 TABLET, DELAYED RELEASE ORAL at 05:56

## 2019-02-19 RX ADMIN — CARVEDILOL PHOSPHATE 25 MILLIGRAM(S): 80 CAPSULE, EXTENDED RELEASE ORAL at 05:56

## 2019-02-19 RX ADMIN — GABAPENTIN 100 MILLIGRAM(S): 400 CAPSULE ORAL at 13:30

## 2019-02-19 RX ADMIN — HEPARIN SODIUM 5000 UNIT(S): 5000 INJECTION INTRAVENOUS; SUBCUTANEOUS at 05:56

## 2019-02-19 RX ADMIN — AMLODIPINE BESYLATE 10 MILLIGRAM(S): 2.5 TABLET ORAL at 05:56

## 2019-02-19 RX ADMIN — INSULIN GLARGINE 5 UNIT(S): 100 INJECTION, SOLUTION SUBCUTANEOUS at 23:03

## 2019-02-19 NOTE — PROGRESS NOTE ADULT - ASSESSMENT
Pt is a 62 yo presenting to ED w/ SOB 2/2 diastolic HF exacerbation and noted to have KEITH and hyperkalemia, PMH CAD s/p CABG and PCI, PAD s/p baloon angioplasty, DM2, HTN, HLD, hx of cardiac arrest, recent GI bleed last month, Diastolic CHF, legally blind, pleural effusion requiring thoracentesis in Dec 2018.     1) Acute on Chronic Diastolic Heart Failure  - Daily weight  - Strict intake/output  - Lasix Infusion switched to Demadex 20 mg BID (restarted today)   - Losartan was restarted   - Cardiology input appreciated   - ECHO shows EF of > 75%. Moderate to Severe TR  2) Acute on Chronic Hypercapnic Respiratory Failure  - H/O OHS/KYREE  - Continue NIV (AVAPS) Nocturnal and PRN (will need on discharge)  - ICU Consult appreciated.   - Pulmonary input appreciated  - Avoid Narcotics for pain  3) Acute on chronic kidney injury  - Improved  - Avoid nephrotoxic medications  - Renal input appreciated  4) Hyperkalemia  - Improved  5) CAD / PAD / HLD / HTN  - Continue Aspirin, Imdur, Lipitor, Coreg, Losartan and Norvasc (increased to 10 mg this am)  - Plavix on hold since last admission due to GI Bleed  6) DM 2  - HbA1c 5.6  - Accu checks and ISS  - Add Lantus 5 units at bedtime  7) Anemia  - Chronic  - H&H stable  - s/p Venofer   - Continue Procrit and Ferrous Sulfate 325 mg   8) Transaminitis  - Likely secondary to liver congestion  - Improved  9) History of Depression  - Continue Duloxetine 60 mg     DVT Prophylaxis -- IPC and Heparin 5000 Units    Dispo: [facility] in 24 hours. Will need NIV. Pt is a 62 yo presenting to ED w/ SOB 2/2 diastolic HF exacerbation and noted to have KEITH and hyperkalemia, PMH CAD s/p CABG and PCI, PAD s/p baloon angioplasty, DM2, HTN, HLD, hx of cardiac arrest, recent GI bleed last month, Diastolic CHF, legally blind, pleural effusion requiring thoracentesis in Dec 2018.     1) Acute on Chronic Diastolic Heart Failure  - Daily weight  - Strict intake/output  - Lasix Infusion switched to Demadex 20 mg BID (restarted today)   - Losartan was restarted   - Cardiology input appreciated   - ECHO shows EF of > 75%. Moderate to Severe TR  2) Acute on Chronic Hypercapnic Respiratory Failure  - H/O OHS/KYREE  - Continue NIV (AVAPS) Nocturnal and PRN (will need on discharge)  - ICU Consult appreciated.   - Pulmonary input appreciated  - Avoid Narcotics for pain  3) Acute on chronic kidney injury  - Improved  - Avoid nephrotoxic medications  - Renal input appreciated  4) Hyperkalemia  - Improved  5) CAD / PAD / HLD / HTN  - Continue Aspirin, Imdur, Lipitor, Coreg, Losartan and Norvasc (increased to 10 mg this am)  - Plavix on hold since last admission due to GI Bleed  6) DM 2  - HbA1c 5.6  - Accu checks and ISS  - Add Lantus 5 units at bedtime  7) Anemia  - Chronic  - H&H stable  - s/p Venofer   - Continue Procrit and Ferrous Sulfate 325 mg   8) Transaminitis  - Likely secondary to liver congestion  - Improved  9) History of Depression  - Continue Duloxetine 60 mg   10) IV infiltration LUE  - Arm elevation  - Warm compresses     DVT Prophylaxis -- IPC and Heparin 5000 Units    Dispo: [facility] in 24 hours. Will need NIV.

## 2019-02-19 NOTE — PROGRESS NOTE ADULT - SUBJECTIVE AND OBJECTIVE BOX
Heart failure    HPI:  Pt is a 64 yo presenting to ED w/ SOB, PMH CAD s/p CABG and PCI, PAD s/p baloon angioplasty, DM2, HTN, HLD, hx of cardiac arrest, recent GI bleed last month, Diastolic CHF, legally blind, pleural effusion requiring thoracentesis in Dec 2018.   Patient states that she has been having increasing SOB over the last week, she lives at home with her family, denies sick contacts, but has been having more exertional dyspnea. She states she walks with a walker but it is taking less activity for her dyspnea to occur. She has been compliant with all of her medications, she does not recall the names, but states that there have been no changes to her medication regimen since her discharge. She denies any associated chest pain or pressure. She has been taking her tosemide she believes. Denies PND or orthopnea, but she has noted increased LE edema.   Denies headaches, nausea, vomiting, chest pain, palpitations, abdominal pain, constipation, diarrhea, melena, hematochezia, dysuria.   Her SOB has since resolved. She is breathing comfortably, has no complaints.  In ED patient was noted to have hyperkalemia on repeat labs, troponin elevated at 0.09. Her creatinine is also elevated. She did not receive any potassium treatment initially so I ordered Calcium gluc, kayexelate, Insulin, dextrose, Albuterol. EKG without widened QRS or peaked T waves. (11 Feb 2019 03:58)    Interval History:  Patient was seen and examined at bedside around 8:30 am. Doing well.     Denies chest pain, palpitations, shortness of breath, headache, dizziness, visual symptoms, nausea, vomiting or abdominal pain.    ROS:  As per interval history otherwise unremarkable.    PHYSICAL EXAM:  Vital Signs   T(C): 36.6 (19 Feb 2019 16:20), Max: 37.1 (19 Feb 2019 10:35)  T(F): 97.9 (19 Feb 2019 16:20), Max: 98.7 (19 Feb 2019 10:35)  HR: 64 (19 Feb 2019 16:20) (63 - 87)  BP: 102/58 (19 Feb 2019 16:20) (102/58 - 141/80)  RR: 18 (19 Feb 2019 16:20) (18 - 19)  SpO2: 100% (19 Feb 2019 16:20) (98% - 100%)  General: Well developed. Well nourished. No acute distress  HEENT: Opaque right cornea. EOMI. Clear conjunctivae. Moist mucus membrane  Neck: Supple. No JVD.   Chest: Decreased air entry at bases. No wheezing, rales or rhonchi.   Heart: Normal S1 & S2. RRR. No murmur.   Abdomen: Soft. Non-tender. Non-distended. + BS. Abdominal wall edema.   Ext: 2+ pedal edema. No calf tenderness. 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 Old healed scars on left leg.   Neuro: AAO x 3. No focal deficit. No speech disorder  Skin: Warm and Dry  Psychiatry: Normal mood and affect    MEDICATIONS  (STANDING):  acetaminophen  IVPB .. 1000 milliGRAM(s) IV Intermittent once  ALBUTerol    0.083% 10 milliGRAM(s) Nebulizer once  amLODIPine   Tablet 10 milliGRAM(s) Oral daily  aspirin  chewable 81 milliGRAM(s) Oral daily  atorvastatin 80 milliGRAM(s) Oral at bedtime  carvedilol 25 milliGRAM(s) Oral every 12 hours  dextrose 5%. 1000 milliLiter(s) (50 mL/Hr) IV Continuous <Continuous>  dextrose 50% Injectable 12.5 Gram(s) IV Push once  dextrose 50% Injectable 25 Gram(s) IV Push once  dextrose 50% Injectable 25 Gram(s) IV Push once  docusate sodium 100 milliGRAM(s) Oral two times a day  DULoxetine 60 milliGRAM(s) Oral daily  epoetin sara Injectable 76888 Unit(s) SubCutaneous <User Schedule>  ferrous sulfate Oral Tab/Cap - Peds 325 milliGRAM(s) Oral daily  gabapentin 100 milliGRAM(s) Oral three times a day  heparin  Injectable 5000 Unit(s) SubCutaneous every 8 hours  insulin lispro (HumaLOG) corrective regimen sliding scale   SubCutaneous three times a day before meals  isosorbide   mononitrate ER Tablet (IMDUR) 60 milliGRAM(s) Oral daily  losartan 25 milliGRAM(s) Oral daily  magnesium oxide 400 milliGRAM(s) Oral daily  pantoprazole    Tablet 40 milliGRAM(s) Oral before breakfast  torsemide 20 milliGRAM(s) Oral every 12 hours    MEDICATIONS  (PRN):  acetaminophen   Tablet .. 650 milliGRAM(s) Oral every 6 hours PRN Mild Pain (1 - 3)  dextrose 40% Gel 15 Gram(s) Oral once PRN Blood Glucose LESS THAN 70 milliGRAM(s)/deciliter  glucagon  Injectable 1 milliGRAM(s) IntraMuscular once PRN Glucose LESS THAN 70 milligrams/deciliter  ondansetron Injectable 4 milliGRAM(s) IV Push every 6 hours PRN Nausea and/or Vomiting      LABS:  CAPILLARY BLOOD GLUCOSE  POCT Blood Glucose.: 274 mg/dL (19 Feb 2019 16:39)  POCT Blood Glucose.: 214 mg/dL (19 Feb 2019 12:14)  POCT Blood Glucose.: 147 mg/dL (19 Feb 2019 08:02)    02-19    144  |  100  |  34.0<H>  ----------------------------<  153<H>  4.8   |  36.0<H>  |  1.08    Ca    8.0<L>      19 Feb 2019 05:48  Mg     2.2     02-19    TPro  7.0  /  Alb  3.0<L>  /  TBili  1.0  /  DBili  0.4<H>  /  AST  22  /  ALT  15  /  AlkPhos  158<H>  02-18      RADIOLOGY & ADDITIONAL STUDIES:  Reviewed Heart failure    HPI:  Pt is a 62 yo presenting to ED w/ SOB, PMH CAD s/p CABG and PCI, PAD s/p baloon angioplasty, DM2, HTN, HLD, hx of cardiac arrest, recent GI bleed last month, Diastolic CHF, legally blind, pleural effusion requiring thoracentesis in Dec 2018.   Patient states that she has been having increasing SOB over the last week, she lives at home with her family, denies sick contacts, but has been having more exertional dyspnea. She states she walks with a walker but it is taking less activity for her dyspnea to occur. She has been compliant with all of her medications, she does not recall the names, but states that there have been no changes to her medication regimen since her discharge. She denies any associated chest pain or pressure. She has been taking her tosemide she believes. Denies PND or orthopnea, but she has noted increased LE edema.   Denies headaches, nausea, vomiting, chest pain, palpitations, abdominal pain, constipation, diarrhea, melena, hematochezia, dysuria.   Her SOB has since resolved. She is breathing comfortably, has no complaints.  In ED patient was noted to have hyperkalemia on repeat labs, troponin elevated at 0.09. Her creatinine is also elevated. She did not receive any potassium treatment initially so I ordered Calcium gluc, kayexelate, Insulin, dextrose, Albuterol. EKG without widened QRS or peaked T waves. (11 Feb 2019 03:58)    Interval History:  Patient was seen and examined at bedside around 8:30 am. Doing well.     Denies chest pain, palpitations, shortness of breath, headache, dizziness, visual symptoms, nausea, vomiting or abdominal pain.    ROS:  As per interval history otherwise unremarkable.    PHYSICAL EXAM:  Vital Signs   T(C): 36.6 (19 Feb 2019 16:20), Max: 37.1 (19 Feb 2019 10:35)  T(F): 97.9 (19 Feb 2019 16:20), Max: 98.7 (19 Feb 2019 10:35)  HR: 64 (19 Feb 2019 16:20) (63 - 87)  BP: 102/58 (19 Feb 2019 16:20) (102/58 - 141/80)  RR: 18 (19 Feb 2019 16:20) (18 - 19)  SpO2: 100% (19 Feb 2019 16:20) (98% - 100%)  General: Well developed. Well nourished. No acute distress  HEENT: Opaque right cornea. EOMI. Clear conjunctivae. Moist mucus membrane  Neck: Supple. No JVD.   Chest: Decreased air entry at bases. No wheezing, rales or rhonchi.   Heart: Normal S1 & S2. RRR. No murmur.   Abdomen: Soft. Non-tender. Non-distended. + BS. Abdominal wall edema.   Ext: 2+ pedal edema. No calf tenderness. 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 Old healed scars on left leg. Swelling in LUE due to IV infiltration.   Neuro: AAO x 3. No focal deficit. No speech disorder  Skin: Warm and Dry  Psychiatry: Normal mood and affect    MEDICATIONS  (STANDING):  acetaminophen  IVPB .. 1000 milliGRAM(s) IV Intermittent once  ALBUTerol    0.083% 10 milliGRAM(s) Nebulizer once  amLODIPine   Tablet 10 milliGRAM(s) Oral daily  aspirin  chewable 81 milliGRAM(s) Oral daily  atorvastatin 80 milliGRAM(s) Oral at bedtime  carvedilol 25 milliGRAM(s) Oral every 12 hours  dextrose 5%. 1000 milliLiter(s) (50 mL/Hr) IV Continuous <Continuous>  dextrose 50% Injectable 12.5 Gram(s) IV Push once  dextrose 50% Injectable 25 Gram(s) IV Push once  dextrose 50% Injectable 25 Gram(s) IV Push once  docusate sodium 100 milliGRAM(s) Oral two times a day  DULoxetine 60 milliGRAM(s) Oral daily  epoetin sara Injectable 53168 Unit(s) SubCutaneous <User Schedule>  ferrous sulfate Oral Tab/Cap - Peds 325 milliGRAM(s) Oral daily  gabapentin 100 milliGRAM(s) Oral three times a day  heparin  Injectable 5000 Unit(s) SubCutaneous every 8 hours  insulin lispro (HumaLOG) corrective regimen sliding scale   SubCutaneous three times a day before meals  isosorbide   mononitrate ER Tablet (IMDUR) 60 milliGRAM(s) Oral daily  losartan 25 milliGRAM(s) Oral daily  magnesium oxide 400 milliGRAM(s) Oral daily  pantoprazole    Tablet 40 milliGRAM(s) Oral before breakfast  torsemide 20 milliGRAM(s) Oral every 12 hours    MEDICATIONS  (PRN):  acetaminophen   Tablet .. 650 milliGRAM(s) Oral every 6 hours PRN Mild Pain (1 - 3)  dextrose 40% Gel 15 Gram(s) Oral once PRN Blood Glucose LESS THAN 70 milliGRAM(s)/deciliter  glucagon  Injectable 1 milliGRAM(s) IntraMuscular once PRN Glucose LESS THAN 70 milligrams/deciliter  ondansetron Injectable 4 milliGRAM(s) IV Push every 6 hours PRN Nausea and/or Vomiting      LABS:  CAPILLARY BLOOD GLUCOSE  POCT Blood Glucose.: 274 mg/dL (19 Feb 2019 16:39)  POCT Blood Glucose.: 214 mg/dL (19 Feb 2019 12:14)  POCT Blood Glucose.: 147 mg/dL (19 Feb 2019 08:02)    02-19    144  |  100  |  34.0<H>  ----------------------------<  153<H>  4.8   |  36.0<H>  |  1.08    Ca    8.0<L>      19 Feb 2019 05:48  Mg     2.2     02-19    TPro  7.0  /  Alb  3.0<L>  /  TBili  1.0  /  DBili  0.4<H>  /  AST  22  /  ALT  15  /  AlkPhos  158<H>  02-18      RADIOLOGY & ADDITIONAL STUDIES:  Reviewed

## 2019-02-19 NOTE — PROGRESS NOTE ADULT - SUBJECTIVE AND OBJECTIVE BOX
NEPHROLOGY INTERVAL HPI/OVERNIGHT EVENTS:    Examined earlier  Feeling better    MEDICATIONS  (STANDING):  acetaminophen  IVPB .. 1000 milliGRAM(s) IV Intermittent once  ALBUTerol    0.083% 10 milliGRAM(s) Nebulizer once  amLODIPine   Tablet 10 milliGRAM(s) Oral daily  aspirin  chewable 81 milliGRAM(s) Oral daily  atorvastatin 80 milliGRAM(s) Oral at bedtime  carvedilol 25 milliGRAM(s) Oral every 12 hours  dextrose 5%. 1000 milliLiter(s) (50 mL/Hr) IV Continuous <Continuous>  dextrose 50% Injectable 12.5 Gram(s) IV Push once  dextrose 50% Injectable 25 Gram(s) IV Push once  dextrose 50% Injectable 25 Gram(s) IV Push once  docusate sodium 100 milliGRAM(s) Oral two times a day  DULoxetine 60 milliGRAM(s) Oral daily  epoetin sara Injectable 84360 Unit(s) SubCutaneous <User Schedule>  ferrous sulfate Oral Tab/Cap - Peds 325 milliGRAM(s) Oral daily  gabapentin 100 milliGRAM(s) Oral three times a day  heparin  Injectable 5000 Unit(s) SubCutaneous every 8 hours  insulin lispro (HumaLOG) corrective regimen sliding scale   SubCutaneous three times a day before meals  isosorbide   mononitrate ER Tablet (IMDUR) 60 milliGRAM(s) Oral daily  losartan 25 milliGRAM(s) Oral daily  magnesium oxide 400 milliGRAM(s) Oral daily  pantoprazole    Tablet 40 milliGRAM(s) Oral before breakfast  torsemide 20 milliGRAM(s) Oral every 12 hours    MEDICATIONS  (PRN):  acetaminophen   Tablet .. 650 milliGRAM(s) Oral every 6 hours PRN Mild Pain (1 - 3)  dextrose 40% Gel 15 Gram(s) Oral once PRN Blood Glucose LESS THAN 70 milliGRAM(s)/deciliter  glucagon  Injectable 1 milliGRAM(s) IntraMuscular once PRN Glucose LESS THAN 70 milligrams/deciliter  ondansetron Injectable 4 milliGRAM(s) IV Push every 6 hours PRN Nausea and/or Vomiting      Allergies    Accupril (Other)    Intolerances        Vital Signs Last 24 Hrs  T(C): 37.1 (2019 10:35), Max: 37.1 (2019 10:35)  T(F): 98.7 (2019 10:35), Max: 98.7 (2019 10:35)  HR: 64 (2019 10:35) (63 - 90)  BP: 125/65 (2019 10:35) (125/65 - 148/-)  BP(mean): --  RR: 18 (2019 10:35) (18 - 19)  SpO2: 99% (2019 05:50) (99% - 100%)  Daily     Daily Weight in k.3 (2019 05:41)    PHYSICAL EXAM:  Comfortable  Cardiovascular: Normal S1 S2, No rub  Respiratory: Clear; diminished BS at bases	  Neuro: A & O x 3; non focal  Gastrointestinal:  Soft, Non-tender, + BS	  Extremities: trace LE edema      LABS:        144  |  100  |  34.0<H>  ----------------------------<  153<H>  4.8   |  36.0<H>  |  1.08    Ca    8.0<L>      2019 05:48  Mg     2.2         TPro  7.0  /  Alb  3.0<L>  /  TBili  1.0  /  DBili  0.4<H>  /  AST  22  /  ALT  15  /  AlkPhos  158<H>          Magnesium, Serum: 2.2 mg/dL ( @ 05:48)          RADIOLOGY & ADDITIONAL TESTS:

## 2019-02-19 NOTE — PROGRESS NOTE ADULT - ASSESSMENT
KEITH: resolved cr 1.0  decompensated CHF (R > L)  hyperNatremia  - off diuretics briefly, serum Na better - agree w restarting toresmide 20mg Q12  - Na better  - monitor labs    Anemia:   - s/p t IV Fe  - cont QUENTIN    Will follow

## 2019-02-20 LAB
ANION GAP SERPL CALC-SCNC: 7 MMOL/L — SIGNIFICANT CHANGE UP (ref 5–17)
BLD GP AB SCN SERPL QL: SIGNIFICANT CHANGE UP
BUN SERPL-MCNC: 46 MG/DL — HIGH (ref 8–20)
CALCIUM SERPL-MCNC: 7.8 MG/DL — LOW (ref 8.6–10.2)
CHLORIDE SERPL-SCNC: 100 MMOL/L — SIGNIFICANT CHANGE UP (ref 98–107)
CO2 SERPL-SCNC: 35 MMOL/L — HIGH (ref 22–29)
CREAT SERPL-MCNC: 1.09 MG/DL — SIGNIFICANT CHANGE UP (ref 0.5–1.3)
GLUCOSE BLDC GLUCOMTR-MCNC: 205 MG/DL — HIGH (ref 70–99)
GLUCOSE BLDC GLUCOMTR-MCNC: 238 MG/DL — HIGH (ref 70–99)
GLUCOSE BLDC GLUCOMTR-MCNC: 248 MG/DL — HIGH (ref 70–99)
GLUCOSE BLDC GLUCOMTR-MCNC: 269 MG/DL — HIGH (ref 70–99)
GLUCOSE SERPL-MCNC: 201 MG/DL — HIGH (ref 70–115)
HCT VFR BLD CALC: 23.6 % — LOW (ref 37–47)
HGB BLD-MCNC: 7.5 G/DL — LOW (ref 12–16)
MCHC RBC-ENTMCNC: 29.5 PG — SIGNIFICANT CHANGE UP (ref 27–31)
MCHC RBC-ENTMCNC: 31.8 G/DL — LOW (ref 32–36)
MCV RBC AUTO: 92.9 FL — SIGNIFICANT CHANGE UP (ref 81–99)
OB PNL STL: POSITIVE
PLATELET # BLD AUTO: 101 K/UL — LOW (ref 150–400)
POTASSIUM SERPL-MCNC: 5.4 MMOL/L — HIGH (ref 3.5–5.3)
POTASSIUM SERPL-MCNC: 5.6 MMOL/L — HIGH (ref 3.5–5.3)
POTASSIUM SERPL-SCNC: 5.4 MMOL/L — HIGH (ref 3.5–5.3)
POTASSIUM SERPL-SCNC: 5.6 MMOL/L — HIGH (ref 3.5–5.3)
RBC # BLD: 2.54 M/UL — LOW (ref 4.4–5.2)
RBC # FLD: 16.5 % — HIGH (ref 11–15.6)
SODIUM SERPL-SCNC: 142 MMOL/L — SIGNIFICANT CHANGE UP (ref 135–145)
TYPE + AB SCN PNL BLD: SIGNIFICANT CHANGE UP
WBC # BLD: 4.5 K/UL — LOW (ref 4.8–10.8)
WBC # FLD AUTO: 4.5 K/UL — LOW (ref 4.8–10.8)

## 2019-02-20 PROCEDURE — 99233 SBSQ HOSP IP/OBS HIGH 50: CPT

## 2019-02-20 PROCEDURE — 93971 EXTREMITY STUDY: CPT | Mod: 26,LT

## 2019-02-20 RX ORDER — ALBUTEROL 90 UG/1
2.5 AEROSOL, METERED ORAL ONCE
Qty: 0 | Refills: 0 | Status: COMPLETED | OUTPATIENT
Start: 2019-02-20 | End: 2019-02-20

## 2019-02-20 RX ORDER — INSULIN HUMAN 100 [IU]/ML
10 INJECTION, SOLUTION SUBCUTANEOUS ONCE
Qty: 0 | Refills: 0 | Status: COMPLETED | OUTPATIENT
Start: 2019-02-20 | End: 2019-02-20

## 2019-02-20 RX ORDER — FLUDROCORTISONE ACETATE 0.1 MG/1
0.1 TABLET ORAL ONCE
Qty: 0 | Refills: 0 | Status: COMPLETED | OUTPATIENT
Start: 2019-02-20 | End: 2019-02-20

## 2019-02-20 RX ORDER — SODIUM POLYSTYRENE SULFONATE 4.1 MEQ/G
15 POWDER, FOR SUSPENSION ORAL ONCE
Qty: 0 | Refills: 0 | Status: DISCONTINUED | OUTPATIENT
Start: 2019-02-20 | End: 2019-02-20

## 2019-02-20 RX ORDER — DEXTROSE 50 % IN WATER 50 %
50 SYRINGE (ML) INTRAVENOUS ONCE
Qty: 0 | Refills: 0 | Status: COMPLETED | OUTPATIENT
Start: 2019-02-20 | End: 2019-02-20

## 2019-02-20 RX ADMIN — ISOSORBIDE MONONITRATE 60 MILLIGRAM(S): 60 TABLET, EXTENDED RELEASE ORAL at 18:22

## 2019-02-20 RX ADMIN — INSULIN GLARGINE 5 UNIT(S): 100 INJECTION, SOLUTION SUBCUTANEOUS at 22:13

## 2019-02-20 RX ADMIN — CARVEDILOL PHOSPHATE 25 MILLIGRAM(S): 80 CAPSULE, EXTENDED RELEASE ORAL at 18:23

## 2019-02-20 RX ADMIN — ERYTHROPOIETIN 20000 UNIT(S): 10000 INJECTION, SOLUTION INTRAVENOUS; SUBCUTANEOUS at 22:13

## 2019-02-20 RX ADMIN — MAGNESIUM OXIDE 400 MG ORAL TABLET 400 MILLIGRAM(S): 241.3 TABLET ORAL at 18:23

## 2019-02-20 RX ADMIN — AMLODIPINE BESYLATE 10 MILLIGRAM(S): 2.5 TABLET ORAL at 06:48

## 2019-02-20 RX ADMIN — Medication 325 MILLIGRAM(S): at 18:21

## 2019-02-20 RX ADMIN — DULOXETINE HYDROCHLORIDE 60 MILLIGRAM(S): 30 CAPSULE, DELAYED RELEASE ORAL at 18:21

## 2019-02-20 RX ADMIN — LOSARTAN POTASSIUM 25 MILLIGRAM(S): 100 TABLET, FILM COATED ORAL at 06:49

## 2019-02-20 RX ADMIN — GABAPENTIN 100 MILLIGRAM(S): 400 CAPSULE ORAL at 22:13

## 2019-02-20 RX ADMIN — GABAPENTIN 100 MILLIGRAM(S): 400 CAPSULE ORAL at 06:48

## 2019-02-20 RX ADMIN — Medication 2: at 12:41

## 2019-02-20 RX ADMIN — Medication 2: at 06:54

## 2019-02-20 RX ADMIN — Medication 50 MILLILITER(S): at 17:23

## 2019-02-20 RX ADMIN — ATORVASTATIN CALCIUM 80 MILLIGRAM(S): 80 TABLET, FILM COATED ORAL at 22:13

## 2019-02-20 RX ADMIN — Medication 100 MILLIGRAM(S): at 06:47

## 2019-02-20 RX ADMIN — INSULIN HUMAN 10 UNIT(S): 100 INJECTION, SOLUTION SUBCUTANEOUS at 18:26

## 2019-02-20 RX ADMIN — CARVEDILOL PHOSPHATE 25 MILLIGRAM(S): 80 CAPSULE, EXTENDED RELEASE ORAL at 06:48

## 2019-02-20 RX ADMIN — HEPARIN SODIUM 5000 UNIT(S): 5000 INJECTION INTRAVENOUS; SUBCUTANEOUS at 06:49

## 2019-02-20 RX ADMIN — Medication 2: at 18:27

## 2019-02-20 RX ADMIN — GABAPENTIN 100 MILLIGRAM(S): 400 CAPSULE ORAL at 18:21

## 2019-02-20 RX ADMIN — PANTOPRAZOLE SODIUM 40 MILLIGRAM(S): 20 TABLET, DELAYED RELEASE ORAL at 06:48

## 2019-02-20 RX ADMIN — Medication 20 MILLIGRAM(S): at 06:48

## 2019-02-20 RX ADMIN — Medication 20 MILLIGRAM(S): at 18:21

## 2019-02-20 RX ADMIN — FLUDROCORTISONE ACETATE 0.1 MILLIGRAM(S): 0.1 TABLET ORAL at 18:24

## 2019-02-20 NOTE — PROGRESS NOTE ADULT - SUBJECTIVE AND OBJECTIVE BOX
Heart failure    HPI:  Pt is a 62 yo presenting to ED w/ SOB, PMH CAD s/p CABG and PCI, PAD s/p baloon angioplasty, DM2, HTN, HLD, hx of cardiac arrest, recent GI bleed last month, Diastolic CHF, legally blind, pleural effusion requiring thoracentesis in Dec 2018.   Patient states that she has been having increasing SOB over the last week, she lives at home with her family, denies sick contacts, but has been having more exertional dyspnea. She states she walks with a walker but it is taking less activity for her dyspnea to occur. She has been compliant with all of her medications, she does not recall the names, but states that there have been no changes to her medication regimen since her discharge. She denies any associated chest pain or pressure. She has been taking her tosemide she believes. Denies PND or orthopnea, but she has noted increased LE edema.   Denies headaches, nausea, vomiting, chest pain, palpitations, abdominal pain, constipation, diarrhea, melena, hematochezia, dysuria.   Her SOB has since resolved. She is breathing comfortably, has no complaints.  In ED patient was noted to have hyperkalemia on repeat labs, troponin elevated at 0.09. Her creatinine is also elevated. She did not receive any potassium treatment initially so I ordered Calcium gluc, kayexelate, Insulin, dextrose, Albuterol. EKG without widened QRS or peaked T waves. (11 Feb 2019 03:58)    Interval History:  Patient was seen and examined at bedside around 10:15 am. Complaining of pain in LUE.      Denies chest pain, palpitations, shortness of breath, headache, dizziness, visual symptoms, nausea, vomiting or abdominal pain.    ROS:  As per interval history otherwise unremarkable.    PHYSICAL EXAM:  Vital Signs   T(C): 37.1 (20 Feb 2019 15:52), Max: 37.1 (20 Feb 2019 15:52)  T(F): 98.8 (20 Feb 2019 15:52), Max: 98.8 (20 Feb 2019 15:52)  HR: 74 (20 Feb 2019 15:52) (64 - 74)  BP: 133/66 (20 Feb 2019 15:52) (102/58 - 133/66)  RR: 18 (20 Feb 2019 15:52) (18 - 19)  SpO2: 97% (20 Feb 2019 08:13) (97% - 100%)  General: Well developed. Well nourished. No acute distress  HEENT: Opaque right cornea. EOMI. Clear conjunctivae. Moist mucus membrane  Neck: Supple. No JVD.   Chest: Decreased air entry at bases. No wheezing, rales or rhonchi.   Heart: Normal S1 & S2. RRR. No murmur.   Abdomen: Soft. Non-tender. Non-distended. + BS. Abdominal wall edema.   Ext: 2+ pedal edema. No calf tenderness. 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 Old healed scars on left leg. Swelling in LUE due to IV infiltration. Neurovascular intact.   Neuro: Active and alert. No focal deficit. No speech disorder  Skin: Warm and Dry  Psychiatry: Normal mood and affect    MEDICATIONS  (STANDING):  ALBUTerol    0.083% 10 milliGRAM(s) Nebulizer once  ALBUTerol    0.083%. 2.5 milliGRAM(s) Nebulizer once  amLODIPine   Tablet 10 milliGRAM(s) Oral daily  atorvastatin 80 milliGRAM(s) Oral at bedtime  carvedilol 25 milliGRAM(s) Oral every 12 hours  dextrose 5%. 1000 milliLiter(s) (50 mL/Hr) IV Continuous <Continuous>  dextrose 50% Injectable 50 milliLiter(s) IV Push once  dextrose 50% Injectable 12.5 Gram(s) IV Push once  dextrose 50% Injectable 25 Gram(s) IV Push once  dextrose 50% Injectable 25 Gram(s) IV Push once  docusate sodium 100 milliGRAM(s) Oral two times a day  DULoxetine 60 milliGRAM(s) Oral daily  epoetin sara Injectable 46725 Unit(s) SubCutaneous <User Schedule>  ferrous sulfate Oral Tab/Cap - Peds 325 milliGRAM(s) Oral daily  fludroCORTISONE 0.1 milliGRAM(s) Oral once  gabapentin 100 milliGRAM(s) Oral three times a day  insulin glargine Injectable (LANTUS) 5 Unit(s) SubCutaneous at bedtime  insulin lispro (HumaLOG) corrective regimen sliding scale   SubCutaneous three times a day before meals  insulin regular  human recombinant 10 Unit(s) IV Push once  isosorbide   mononitrate ER Tablet (IMDUR) 60 milliGRAM(s) Oral daily  magnesium oxide 400 milliGRAM(s) Oral daily  pantoprazole    Tablet 40 milliGRAM(s) Oral before breakfast  torsemide 20 milliGRAM(s) Oral every 12 hours    MEDICATIONS  (PRN):  acetaminophen   Tablet .. 650 milliGRAM(s) Oral every 6 hours PRN Mild Pain (1 - 3)  dextrose 40% Gel 15 Gram(s) Oral once PRN Blood Glucose LESS THAN 70 milliGRAM(s)/deciliter  glucagon  Injectable 1 milliGRAM(s) IntraMuscular once PRN Glucose LESS THAN 70 milligrams/deciliter  ondansetron Injectable 4 milliGRAM(s) IV Push every 6 hours PRN Nausea and/or Vomiting    LABS:  CAPILLARY BLOOD GLUCOSE  POCT Blood Glucose.: 238 mg/dL (20 Feb 2019 12:37)  POCT Blood Glucose.: 205 mg/dL (20 Feb 2019 06:51)  POCT Blood Glucose.: 276 mg/dL (19 Feb 2019 22:45)  POCT Blood Glucose.: 274 mg/dL (19 Feb 2019 16:39)                        7.5    4.5   )-----------( 101      ( 20 Feb 2019 12:03 )             23.6     02-20    x   |  x   |  x   ----------------------------<  x   5.6<H>   |  x   |  x     Ca    7.8<L>      20 Feb 2019 08:04  Mg     2.2     02-19    RADIOLOGY & ADDITIONAL STUDIES:  Reviewed

## 2019-02-20 NOTE — PROGRESS NOTE ADULT - ASSESSMENT
Pt is a 62 yo presenting to ED w/ SOB 2/2 diastolic HF exacerbation and noted to have KEITH and hyperkalemia, PMH CAD s/p CABG and PCI, PAD s/p baloon angioplasty, DM2, HTN, HLD, hx of cardiac arrest, recent GI bleed last month, Diastolic CHF, legally blind, pleural effusion requiring thoracentesis in Dec 2018.     1) Lower GI Bleed  - Type & Screen  - Transfuse 1 unit of PRBCs  - Hold Aspirin and Heparin (for DVT Prophylaxis)  - GI Consult  2) Hyperkalemia  - Hold Losartan  - Insulin & Dextrose  - Albuterol Neb  - Florinef 0.1 mg   3) Acute on Chronic Diastolic Heart Failure  - Improved   - Lasix Infusion switched to Demadex 20 mg BID    - Hold Losartan due to hyperkalemia   - Cardiology input appreciated   - ECHO shows EF of > 75%. Moderate to Severe TR  4) Acute on Chronic Hypercapnic Respiratory Failure  - H/O OHS/KYREE  - Continue NIV (AVAPS) Nocturnal and PRN (will need on discharge)  - ICU Consult appreciated.   - Pulmonary input appreciated  - Avoid Narcotics for pain  5) Acute on chronic kidney injury  - Improved  - Avoid nephrotoxic medications  - Renal input appreciated  6) CAD / PAD / HLD / HTN  - Continue Aspirin, Imdur, Lipitor, Coreg and Norvasc   - Plavix on hold since last admission due to GI Bleed  - Aspirin held today due to recurrent GI Bleed  7) DM 2  - HbA1c 5.6  - Accu checks and ISS  - Added Lantus 5 units at bedtime  8) Anemia  - Multifactorial (chronic, now complicated with acute bleeding)  - Plan as above  - s/p Venofer   - Continue Procrit and Ferrous Sulfate 325 mg   9) Transaminitis  - Likely secondary to liver congestion  - Improved  10) History of Depression  - Continue Duloxetine 60 mg   11) IV infiltration LUE  - Arm elevation  - Warm compresses   - Doppler LUE    DVT Prophylaxis -- IPC     Dispo: [facility]. Will need NIV.

## 2019-02-20 NOTE — CONSULT NOTE ADULT - ASSESSMENT
73y old Female with a past medical history significant for HTN, CAD, CHF, PVD hypercapnia admitted with hyperkalemia now with lower gi bleeding. Possibly secondary to a colonic AVM vs int hemorrhoids.    - monitor cbc  - transfuse as need  - clear liquids tomorrow   - colonoscopy Friday     Thanks

## 2019-02-20 NOTE — CONSULT NOTE ADULT - SUBJECTIVE AND OBJECTIVE BOX
HISTORY OF PRESENT ILLNESS:  This is a 63y old Female with a past medical history significant for HTN, CAD, CHF, PVD hypercapnia admitted with hyperkalemia now with lower gi bleeding. She has had 3-4 BMs with dark maroon blood. Hgb decreased from 10 to 7. Patient was recently admitted with dark stools and a hgb of 5. She had a CTA with bleeding noted at the transverse colon IR angio was performed no active bleed found. Colonoscopy only revealed a 1 cm polyp and a EGD was unremarkable. Ct enterography in 2019 was also unremarkable.       REVIEW OF SYSTEMS:  Constitutional:  No unintentional weight loss, fevers, chills or night sweats	  Eyes: No eye pain, redness, discharge, or proptosis  ENMT: No sore throat, ear pain, mouth sores, or swollen glands in the neck  Respiratory: No dyspnea, cough or wheezing  Cardiovascular: No chest pain, dyspnea on exertion, or orthopnea  Gastrointestinal:	Please see HPI  Genitourinary: No dysuria or hematuria  Neurological:	 No changes in sleep/wake cycle, convulsions, confusion, dizziness or lightheadedness  Psychiatric: No changes in personality or emotional problems   Hematology: No easy bruising   Endocrine: No hot or cold flashes or deepening of voice	  All other review of systems were completed and were otherwise negative save what is reported in the HPI.    PAST MEDICAL/SURGICAL HISTORY:  Herniated disc, cervical  PAD (peripheral artery disease)  Legally blind  History of peripheral vascular disease  History of retinal detachment  History of CEA (carotid endarterectomy): Right  Hyperlipidemia  Glaucoma  Hypertension  Diabetes  S/P CABG x 1  After cataract  Uveitic glaucoma of both eyes  Detached retina  Atherosclerosis of right carotid artery   delivery delivered    SOCIAL HISTORY:  - TOBACCO: none  - ALCOHOL: none  - ILLICIT DRUG USE: Denies    FAMILY HISTORY:  No known history of gastrointestinal or liver disease;      HOME MEDICATIONS:  aspirin 81 mg oral tablet, chewable: 1 tab(s) orally once a day (2019 03:44)  atorvastatin 80 mg oral tablet: 1 tab(s) orally once a day (at bedtime) (2019 03:44)  carvedilol 25 mg oral tablet: 1 tab(s) orally every 12 hours (2019 03:44)  docusate sodium 100 mg oral capsule: 1 cap(s) orally 2 times a day (2019 03:44)  DULoxetine 60 mg oral delayed release capsule: 1 cap(s) orally once a day (2019 03:44)  ferrous sulfate 325 mg (65 mg elemental iron) oral tablet: 1 tab(s) orally 2 times a day (2019 03:44)  gabapentin 100 mg oral capsule: 1 cap(s) orally 3 times a day (2019 03:44)  isosorbide mononitrate 60 mg oral tablet, extended release: 1 tab(s) orally once a day (in the morning) (2019 03:44)  magnesium oxide 400 mg (240 mg elemental magnesium) oral tablet: 1 tab(s) orally once a day (2019 03:44)  melatonin 3 mg oral tablet: 1 tab(s) orally once a day (at bedtime), As needed, Insomnia (:44)  metFORMIN 850 mg oral tablet: orally 2 times a day (with meals) (2019 03:44)  pantoprazole 40 mg oral delayed release tablet: 1 tab(s) orally once a day (before a meal) (2019 03:44)    INPATIENT MEDICATIONS:  MEDICATIONS  (STANDING):  ALBUTerol    0.083% 10 milliGRAM(s) Nebulizer once  ALBUTerol    0.083%. 2.5 milliGRAM(s) Nebulizer once  amLODIPine   Tablet 10 milliGRAM(s) Oral daily  atorvastatin 80 milliGRAM(s) Oral at bedtime  carvedilol 25 milliGRAM(s) Oral every 12 hours  dextrose 5%. 1000 milliLiter(s) (50 mL/Hr) IV Continuous <Continuous>  dextrose 50% Injectable 50 milliLiter(s) IV Push once  dextrose 50% Injectable 12.5 Gram(s) IV Push once  dextrose 50% Injectable 25 Gram(s) IV Push once  dextrose 50% Injectable 25 Gram(s) IV Push once  docusate sodium 100 milliGRAM(s) Oral two times a day  DULoxetine 60 milliGRAM(s) Oral daily  epoetin sara Injectable 90096 Unit(s) SubCutaneous <User Schedule>  ferrous sulfate Oral Tab/Cap - Peds 325 milliGRAM(s) Oral daily  fludroCORTISONE 0.1 milliGRAM(s) Oral once  gabapentin 100 milliGRAM(s) Oral three times a day  insulin glargine Injectable (LANTUS) 5 Unit(s) SubCutaneous at bedtime  insulin lispro (HumaLOG) corrective regimen sliding scale   SubCutaneous three times a day before meals  insulin regular  human recombinant 10 Unit(s) IV Push once  isosorbide   mononitrate ER Tablet (IMDUR) 60 milliGRAM(s) Oral daily  magnesium oxide 400 milliGRAM(s) Oral daily  pantoprazole    Tablet 40 milliGRAM(s) Oral before breakfast  torsemide 20 milliGRAM(s) Oral every 12 hours    MEDICATIONS  (PRN):  acetaminophen   Tablet .. 650 milliGRAM(s) Oral every 6 hours PRN Mild Pain (1 - 3)  dextrose 40% Gel 15 Gram(s) Oral once PRN Blood Glucose LESS THAN 70 milliGRAM(s)/deciliter  glucagon  Injectable 1 milliGRAM(s) IntraMuscular once PRN Glucose LESS THAN 70 milligrams/deciliter  ondansetron Injectable 4 milliGRAM(s) IV Push every 6 hours PRN Nausea and/or Vomiting    ALLERGIES:  Accupril (Other)    VITAL SIGNS LAST 24 HOURS:  T(C): 37.1 (2019 15:52), Max: 37.1 (2019 15:52)  T(F): 98.8 (2019 15:52), Max: 98.8 (2019 15:52)  HR: 74 (2019 15:52) (67 - 74)  BP: 133/66 (2019 15:52) (113/62 - 133/66)  BP(mean): --  RR: 18 (2019 15:52) (18 - 19)  SpO2: 97% (2019 08:13) (97% - 100%)  Mode: NUPUR reeves 3L    19 @ 07:01  -  19 @ 07:00  --------------------------------------------------------  IN: 720 mL / OUT: 0 mL / NET: 720 mL        19 @ 07:01  -  19 @ 07:00  --------------------------------------------------------  IN: 720 mL / OUT: 0 mL / NET: 720 mL      Mode: standby,NC 3L    PHYSICAL EXAM:  Constitutional: Well-developed, well-nourished, in no apparent distress  Eyes: Sclerae anicteric, conjunctivae normal  ENMT: Mucus membranes moist, no oropharyngeal thrush noted  Neck: No thyroid nodules appreciated, no significant cervical or supraclavicular lymphadenopathy  Respiratory: Breathing nonlabored; clear to auscultation  Cardiovascular: Regular rate and rhythm  Gastrointestinal: Soft, obese, nontender, nondistended, normoactive bowel sounds; no hepatosplenomegaly appreciated; no rebound tenderness or involuntary guarding  Rectal: Perianal exam within normal limits; normal sphincter tone; dark brown stool and dark red blood on glove  Extremities: No clubbing, cyanosis or edema  Neurological: Alert and oriented to person, place and time; no asterixis  Skin: No jaundice  Lymph Nodes: No significant lymphadenopathy  Musculoskeletal: No significant peripheral atrophy  Psychiatric: Affect and mood appropriate      LABS:                        7.5    4.5   )-----------( 101      ( 2019 12:03 )             23.6       02-20    x   |  x   |  x   ----------------------------<  x   5.6<H>   |  x   |  x     Ca    7.8<L>      2019 08:04  Mg     2.2     02-19      IMAGING:  < from: CT Angio Abdomen and Pelvis w/ IV Cont (19 @ 13:30) >  Findings: Precontrast helical axial images through the abdomen and pelvis   followed by postcontrast arterial phase and 2 minute delayed portal   venous phase helical axial images through the abdomen and pelvis   following intravenous contrast injection of 95 cc Omnipaque 350. Sagittal   and coronal reformat images as well as MIP reconstructed images also   provided.     Correlation is made with a prior postcontrast CT abdomen/pelvis 2018.    Active intraluminal extravasation of contrast is seen within the   redundant proximal transverse colon (arterial phase axial series 5 images   # 69 - 72 and 77 and on the 2 minute delayed axial series 12 images # 70   - 86 and 2 minute delayed coronal series 14 images # 28-31).    Images through the lung bases reveals small right pleural effusion,   decreased from prior exam. There is a tiny nodule-like density seen along   the right major fissure. Patient has history of sternotomy and CABG.   Atrial enlargement. Coronary artery calcification. On the arterial phase   images, there is reflux of contrast noted into the hepatic veins.    No focal liver mass. Gallbladder is not dilated. No biliary ductal   dilatation. The spleen is not enlarged. Accessory splenule noted.   Pancreas is unremarkable. No adrenal mass. Few small nonobstructing renal   calculi. Approximate 2 cm cyst posterior mid to lower pole left kidney.   No hydronephrosis of either kidney.     Aorto vascular calcification without aneurysmal dilatation of the   abdominal aorta. Patient has history of left femoropopliteal bypass   graft. An approximate 4.2 x 2.7 cm ovoid fluid collection is again seen   left inguinal region.     The unopacified urinary bladder is unremarkable. The uterus and adnexa   are not enlarged.    Evaluation for wall thickening in the stomach and bowel is limited due to   lack of oral contrast administration/opacification. There is a   nonobstructive bowel gas pattern. The appendix is visualized and is   unremarkable. Moderate amount of stool is seen throughout the right   colon. No free air, fluid collection or significant inflammatory change   in the mesentery seen.    Degenerative changes of the spine.     Diffuse soft tissue edematous changes/anasarca improved from prior study.    Impression:    Active lower GI bleed with intraluminal extravasation of contrast seen   within the redundant proximal transverse colon as described above. This   was reported to Dr. Flanagan on 2019 at 2:00 PM.    Additional findings as described.    < end of copied text >

## 2019-02-20 NOTE — PROGRESS NOTE ADULT - ASSESSMENT
Hyperkalemia - Likely from Losartan + GIB  Had loose BM earlier- avoid Kayexalate  Florief/Albuterol  GI eval Pending  BUN higher but creatinine remains normal  to get PRBC

## 2019-02-20 NOTE — PROGRESS NOTE ADULT - SUBJECTIVE AND OBJECTIVE BOX
Patient seen and examined    Feels fine, Lying quietly in bed  no c/o CP SOB NV Had loose BM in bed  Has some swelling feet    Vital Signs Last 24 Hrs  T(C): 37.1 (20 Feb 2019 15:52), Max: 37.1 (20 Feb 2019 15:52)  T(F): 98.8 (20 Feb 2019 15:52), Max: 98.8 (20 Feb 2019 15:52)  HR: 74 (20 Feb 2019 15:52) (64 - 74)  BP: 133/66 (20 Feb 2019 15:52) (102/58 - 133/66)  BP(mean): --  RR: 18 (20 Feb 2019 15:52) (18 - 19)  SpO2: 97% (20 Feb 2019 08:13) (97% - 100%)    PHYSICAL EXAM    GENERAL: NAD, Obese pleasant  NECK: Supple, No JVD/Bruit  NERVOUS SYSTEM:  A/O x3,   CHEST:  No rales, No rhonchi  HEART:  RRR, No murmur  ABDOMEN: Soft, NT/ND BS+  EXTREMITIES:  + Edema;  SKIN: No rashes    20 Feb 2019 12:03    x      |  x      |  x      ----------------------------<  x      5.6     |  x      |  x        Ca    7.8        20 Feb 2019 08:04  Mg     2.2       19 Feb 2019 05:48                            7.5    4.5   )-----------( 101      ( 20 Feb 2019 12:03 )             23.6         Stool OB +

## 2019-02-21 ENCOUNTER — TRANSCRIPTION ENCOUNTER (OUTPATIENT)
Age: 64
End: 2019-02-21

## 2019-02-21 DIAGNOSIS — K62.5 HEMORRHAGE OF ANUS AND RECTUM: ICD-10-CM

## 2019-02-21 LAB
ANION GAP SERPL CALC-SCNC: 5 MMOL/L — SIGNIFICANT CHANGE UP (ref 5–17)
BASOPHILS # BLD AUTO: 0 K/UL — SIGNIFICANT CHANGE UP (ref 0–0.2)
BASOPHILS NFR BLD AUTO: 0.5 % — SIGNIFICANT CHANGE UP (ref 0–2)
BUN SERPL-MCNC: 55 MG/DL — HIGH (ref 8–20)
CALCIUM SERPL-MCNC: 8.1 MG/DL — LOW (ref 8.6–10.2)
CHLORIDE SERPL-SCNC: 102 MMOL/L — SIGNIFICANT CHANGE UP (ref 98–107)
CO2 SERPL-SCNC: 37 MMOL/L — HIGH (ref 22–29)
CREAT SERPL-MCNC: 1.16 MG/DL — SIGNIFICANT CHANGE UP (ref 0.5–1.3)
EOSINOPHIL # BLD AUTO: 0.1 K/UL — SIGNIFICANT CHANGE UP (ref 0–0.5)
EOSINOPHIL NFR BLD AUTO: 1.8 % — SIGNIFICANT CHANGE UP (ref 0–6)
GLUCOSE BLDC GLUCOMTR-MCNC: 140 MG/DL — HIGH (ref 70–99)
GLUCOSE BLDC GLUCOMTR-MCNC: 154 MG/DL — HIGH (ref 70–99)
GLUCOSE BLDC GLUCOMTR-MCNC: 239 MG/DL — HIGH (ref 70–99)
GLUCOSE BLDC GLUCOMTR-MCNC: 279 MG/DL — HIGH (ref 70–99)
GLUCOSE SERPL-MCNC: 121 MG/DL — HIGH (ref 70–115)
HCT VFR BLD CALC: 24.9 % — LOW (ref 37–47)
HGB BLD-MCNC: 8.1 G/DL — LOW (ref 12–16)
INR BLD: 1.22 RATIO — HIGH (ref 0.88–1.16)
LYMPHOCYTES # BLD AUTO: 0.7 K/UL — LOW (ref 1–4.8)
LYMPHOCYTES # BLD AUTO: 17.4 % — LOW (ref 20–55)
MCHC RBC-ENTMCNC: 29.8 PG — SIGNIFICANT CHANGE UP (ref 27–31)
MCHC RBC-ENTMCNC: 32.5 G/DL — SIGNIFICANT CHANGE UP (ref 32–36)
MCV RBC AUTO: 91.5 FL — SIGNIFICANT CHANGE UP (ref 81–99)
MONOCYTES # BLD AUTO: 0.3 K/UL — SIGNIFICANT CHANGE UP (ref 0–0.8)
MONOCYTES NFR BLD AUTO: 7.6 % — SIGNIFICANT CHANGE UP (ref 3–10)
NEUTROPHILS # BLD AUTO: 2.9 K/UL — SIGNIFICANT CHANGE UP (ref 1.8–8)
NEUTROPHILS NFR BLD AUTO: 72.7 % — SIGNIFICANT CHANGE UP (ref 37–73)
PLATELET # BLD AUTO: 118 K/UL — LOW (ref 150–400)
POTASSIUM SERPL-MCNC: 4.9 MMOL/L — SIGNIFICANT CHANGE UP (ref 3.5–5.3)
POTASSIUM SERPL-SCNC: 4.9 MMOL/L — SIGNIFICANT CHANGE UP (ref 3.5–5.3)
PROTHROM AB SERPL-ACNC: 14.1 SEC — HIGH (ref 10–12.9)
RBC # BLD: 2.72 M/UL — LOW (ref 4.4–5.2)
RBC # FLD: 17 % — HIGH (ref 11–15.6)
SODIUM SERPL-SCNC: 144 MMOL/L — SIGNIFICANT CHANGE UP (ref 135–145)
WBC # BLD: 4 K/UL — LOW (ref 4.8–10.8)
WBC # FLD AUTO: 4 K/UL — LOW (ref 4.8–10.8)

## 2019-02-21 PROCEDURE — 99233 SBSQ HOSP IP/OBS HIGH 50: CPT

## 2019-02-21 PROCEDURE — 99232 SBSQ HOSP IP/OBS MODERATE 35: CPT

## 2019-02-21 RX ORDER — SOD SULF/SODIUM/NAHCO3/KCL/PEG
1000 SOLUTION, RECONSTITUTED, ORAL ORAL
Qty: 0 | Refills: 0 | Status: COMPLETED | OUTPATIENT
Start: 2019-02-21 | End: 2019-02-22

## 2019-02-21 RX ADMIN — ISOSORBIDE MONONITRATE 60 MILLIGRAM(S): 60 TABLET, EXTENDED RELEASE ORAL at 11:03

## 2019-02-21 RX ADMIN — GABAPENTIN 100 MILLIGRAM(S): 400 CAPSULE ORAL at 20:54

## 2019-02-21 RX ADMIN — CARVEDILOL PHOSPHATE 25 MILLIGRAM(S): 80 CAPSULE, EXTENDED RELEASE ORAL at 17:13

## 2019-02-21 RX ADMIN — Medication 20 MILLIGRAM(S): at 17:13

## 2019-02-21 RX ADMIN — Medication 3: at 17:12

## 2019-02-21 RX ADMIN — Medication 1000 MILLILITER(S): at 20:42

## 2019-02-21 RX ADMIN — GABAPENTIN 100 MILLIGRAM(S): 400 CAPSULE ORAL at 05:59

## 2019-02-21 RX ADMIN — PANTOPRAZOLE SODIUM 40 MILLIGRAM(S): 20 TABLET, DELAYED RELEASE ORAL at 05:59

## 2019-02-21 RX ADMIN — DULOXETINE HYDROCHLORIDE 60 MILLIGRAM(S): 30 CAPSULE, DELAYED RELEASE ORAL at 11:03

## 2019-02-21 RX ADMIN — CARVEDILOL PHOSPHATE 25 MILLIGRAM(S): 80 CAPSULE, EXTENDED RELEASE ORAL at 05:59

## 2019-02-21 RX ADMIN — Medication 1: at 07:48

## 2019-02-21 RX ADMIN — ATORVASTATIN CALCIUM 80 MILLIGRAM(S): 80 TABLET, FILM COATED ORAL at 20:54

## 2019-02-21 RX ADMIN — GABAPENTIN 100 MILLIGRAM(S): 400 CAPSULE ORAL at 11:03

## 2019-02-21 RX ADMIN — Medication 10 MILLIGRAM(S): at 11:03

## 2019-02-21 RX ADMIN — AMLODIPINE BESYLATE 10 MILLIGRAM(S): 2.5 TABLET ORAL at 05:59

## 2019-02-21 RX ADMIN — INSULIN GLARGINE 5 UNIT(S): 100 INJECTION, SOLUTION SUBCUTANEOUS at 20:54

## 2019-02-21 RX ADMIN — Medication 100 MILLIGRAM(S): at 17:13

## 2019-02-21 RX ADMIN — MAGNESIUM OXIDE 400 MG ORAL TABLET 400 MILLIGRAM(S): 241.3 TABLET ORAL at 11:03

## 2019-02-21 RX ADMIN — Medication 20 MILLIGRAM(S): at 05:59

## 2019-02-21 NOTE — PROGRESS NOTE ADULT - SUBJECTIVE AND OBJECTIVE BOX
Patient seen and examined    Feels fine, Lying quietly in bed  no c/o CP SOB NV Had loose BM in bed  Has some swelling feet    Vital Signs Last 24 Hrs  T(C): 36.7 (02-21-19 @ 15:10), Max: 37 (02-21-19 @ 08:04)  T(F): 98 (02-21-19 @ 15:10), Max: 98.6 (02-21-19 @ 08:04)  HR: 65 (02-21-19 @ 15:10) (65 - 72)  BP: 123/72 (02-21-19 @ 15:10) (111/62 - 124/68)  BP(mean): --  RR: 108 (02-21-19 @ 15:10) (18 - 108)  SpO2: 97% (02-21-19 @ 15:10) (97% - 99%)    PHYSICAL EXAM    GENERAL: NAD, Obese pleasant  NECK: Supple, No JVD/Bruit  NERVOUS SYSTEM:  A/O x3,   CHEST:  No rales, No rhonchi  HEART:  RRR, No murmur  ABDOMEN: Soft, NT/ND BS+  EXTREMITIES:  + Edema;  SKIN: No rashes    20 Feb 2019 12:03    x      |  x      |  x      ----------------------------<  x      5.6     |  x      |  x        Ca    7.8        20 Feb 2019 08:04  Mg     2.2       19 Feb 2019 05:48    02-21    144  |  102  |  55.0<H>  ----------------------------<  121<H>  4.9   |  37.0<H>  |  1.16    Ca    8.1<L>      21 Feb 2019 15:09                            7.5    4.5   )-----------( 101      ( 20 Feb 2019 12:03 )             23.6                         8.1    4.0   )-----------( 118      ( 21 Feb 2019 15:09 )             24.9         Stool OB +

## 2019-02-21 NOTE — PROGRESS NOTE ADULT - ASSESSMENT
Hyperkalemia - Likely from Losartan + GIB - resolved now   RSecd Florief/Albuterol  GI eval noted - for colonoscopy am  BUN high but creatinine remains normal  Hb sl better with PRBC

## 2019-02-21 NOTE — CHART NOTE - NSCHARTNOTEFT_GEN_A_CORE
Source: Patient []  Family [ ]   other [x] EMR; patient asleep, not easily awoken to verbal stimuli    Current Diet: Diet, Clear Liquid (02-21-19 @ 09:32)  Diet, NPO after Midnight:      NPO Start Date: 21-Feb-2019,   NPO Start Time: 23:59 (02-21-19 @ 09:32)    Patient reports [ ] nausea  [ ] vomiting [ ] diarrhea [ ] constipation  [ ]chewing problems [ ] swallowing issues  [ ] other: denies    PO intake:  < 50% [x]   50-75%  [ ]   %  [ ]  other :    Source for PO intake [] Patient [ ] family [x] chart [ ] staff [ ] other    Current Weight:   (2/19) 245.3#  (2/16) 210#    % Weight Change: Question accuracy of weights.    Pertinent Medications: MEDICATIONS  (STANDING):  ALBUTerol    0.083% 10 milliGRAM(s) Nebulizer once  amLODIPine   Tablet 10 milliGRAM(s) Oral daily  atorvastatin 80 milliGRAM(s) Oral at bedtime  carvedilol 25 milliGRAM(s) Oral every 12 hours  dextrose 5%. 1000 milliLiter(s) (50 mL/Hr) IV Continuous <Continuous>  dextrose 50% Injectable 12.5 Gram(s) IV Push once  dextrose 50% Injectable 25 Gram(s) IV Push once  dextrose 50% Injectable 25 Gram(s) IV Push once  docusate sodium 100 milliGRAM(s) Oral two times a day  DULoxetine 60 milliGRAM(s) Oral daily  epoetin sara Injectable 75311 Unit(s) SubCutaneous <User Schedule>  gabapentin 100 milliGRAM(s) Oral three times a day  insulin glargine Injectable (LANTUS) 5 Unit(s) SubCutaneous at bedtime  insulin lispro (HumaLOG) corrective regimen sliding scale   SubCutaneous three times a day before meals  isosorbide   mononitrate ER Tablet (IMDUR) 60 milliGRAM(s) Oral daily  magnesium oxide 400 milliGRAM(s) Oral daily  pantoprazole    Tablet 40 milliGRAM(s) Oral before breakfast  polyethylene glycol/electrolyte Solution 1000 milliLiter(s) Oral two times a day  torsemide 20 milliGRAM(s) Oral every 12 hours    MEDICATIONS  (PRN):  acetaminophen   Tablet .. 650 milliGRAM(s) Oral every 6 hours PRN Mild Pain (1 - 3)  dextrose 40% Gel 15 Gram(s) Oral once PRN Blood Glucose LESS THAN 70 milliGRAM(s)/deciliter  glucagon  Injectable 1 milliGRAM(s) IntraMuscular once PRN Glucose LESS THAN 70 milligrams/deciliter  ondansetron Injectable 4 milliGRAM(s) IV Push every 6 hours PRN Nausea and/or Vomiting    Pertinent Labs: CBC Full  -  ( 20 Feb 2019 12:03 )  WBC Count : 4.5 K/uL  Hemoglobin : 7.5 g/dL  Hematocrit : 23.6 %  Platelet Count - Automated : 101 K/uL  Mean Cell Volume : 92.9 fl  Mean Cell Hemoglobin : 29.5 pg  Mean Cell Hemoglobin Concentration : 31.8 g/dL    , Hgb 7.5, BUN 46, Glu 201, Alb 3.0    Skin: 2+ general edema, 2+R/L arm, right heel diabetic ulcer    Nutrition focused physical exam NOT conducted     Estimated Needs:   [x] no change since previous assessment  [ ] recalculated:     Current Nutrition Diagnosis:  Pt now presents with moderate acute malnutrition related to inadequate protein-energy intake in setting of poor appetite, and medical intervention requiring intermittent NPO/CLD as evidenced by general and localized fluid accumulation (2+ general edema, 2+R/L arm), meeting <75% of protein-energy needs >7 days.     Pt started on clear liquid diet on 2/21; consuming <25% of meals with assistance. Last BM 2/21. Plan for colonoscopy tomorrow for GI. BUN trending high, will continue to monitor. RD to remain available.      Recommendations:   1) When medically feasible, advanced diet to full liquid-- > DASH/TLC  2) RD to remain available for nutrition education when appropriate    Monitoring and Evaluation:   [x] PO intake [x] Tolerance to diet prescription [X] Weights  [X] Follow up per protocol [X] Labs:

## 2019-02-21 NOTE — PROGRESS NOTE ADULT - SUBJECTIVE AND OBJECTIVE BOX
Heart failure    HPI:  Pt is a 64 yo presenting to ED w/ SOB, PMH CAD s/p CABG and PCI, PAD s/p baloon angioplasty, DM2, HTN, HLD, hx of cardiac arrest, recent GI bleed last month, Diastolic CHF, legally blind, pleural effusion requiring thoracentesis in Dec 2018.   Patient states that she has been having increasing SOB over the last week, she lives at home with her family, denies sick contacts, but has been having more exertional dyspnea. She states she walks with a walker but it is taking less activity for her dyspnea to occur. She has been compliant with all of her medications, she does not recall the names, but states that there have been no changes to her medication regimen since her discharge. She denies any associated chest pain or pressure. She has been taking her tosemide she believes. Denies PND or orthopnea, but she has noted increased LE edema.   Denies headaches, nausea, vomiting, chest pain, palpitations, abdominal pain, constipation, diarrhea, melena, hematochezia, dysuria.   Her SOB has since resolved. She is breathing comfortably, has no complaints.  In ED patient was noted to have hyperkalemia on repeat labs, troponin elevated at 0.09. Her creatinine is also elevated. She did not receive any potassium treatment initially so I ordered Calcium gluc, kayexelate, Insulin, dextrose, Albuterol. EKG without widened QRS or peaked T waves. (11 Feb 2019 03:58)    Interval History:  Patient was seen and examined at bedside around 8:30 am. Feeling better.     Denies chest pain, palpitations, shortness of breath, headache, dizziness, visual symptoms, nausea, vomiting or abdominal pain.    ROS:  As per interval history otherwise unremarkable.    PHYSICAL EXAM:  Vital Signs   T(C): 36.7 (21 Feb 2019 15:10), Max: 37 (21 Feb 2019 08:04)  T(F): 98 (21 Feb 2019 15:10), Max: 98.6 (21 Feb 2019 08:04)  HR: 65 (21 Feb 2019 15:10) (65 - 72)  BP: 123/72 (21 Feb 2019 15:10) (111/62 - 124/68)  RR: 108 (21 Feb 2019 15:10) (18 - 108)  SpO2: 97% (21 Feb 2019 15:10) (97% - 99%)  General: Well developed. Well nourished. No acute distress  HEENT: Opaque right cornea. EOMI. Clear conjunctivae. Moist mucus membrane  Neck: Supple. No JVD.   Chest: Decreased air entry at bases. No wheezing, rales or rhonchi.   Heart: Normal S1 & S2. RRR. No murmur.   Abdomen: Soft. Non-tender. Non-distended. + BS. Abdominal wall edema.   Ext: 2+ pedal edema. No calf tenderness. 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 Old healed scars on left leg. Swelling in LUE due to IV infiltration. Neurovascular intact.   Neuro: Active and alert. No focal deficit. No speech disorder  Skin: Warm and Dry  Psychiatry: Normal mood and affect    MEDICATIONS  (STANDING):  ALBUTerol    0.083% 10 milliGRAM(s) Nebulizer once  amLODIPine   Tablet 10 milliGRAM(s) Oral daily  atorvastatin 80 milliGRAM(s) Oral at bedtime  carvedilol 25 milliGRAM(s) Oral every 12 hours  dextrose 5%. 1000 milliLiter(s) (50 mL/Hr) IV Continuous <Continuous>  dextrose 50% Injectable 12.5 Gram(s) IV Push once  dextrose 50% Injectable 25 Gram(s) IV Push once  dextrose 50% Injectable 25 Gram(s) IV Push once  docusate sodium 100 milliGRAM(s) Oral two times a day  DULoxetine 60 milliGRAM(s) Oral daily  epoetin sara Injectable 17063 Unit(s) SubCutaneous <User Schedule>  gabapentin 100 milliGRAM(s) Oral three times a day  insulin glargine Injectable (LANTUS) 5 Unit(s) SubCutaneous at bedtime  insulin lispro (HumaLOG) corrective regimen sliding scale   SubCutaneous three times a day before meals  isosorbide   mononitrate ER Tablet (IMDUR) 60 milliGRAM(s) Oral daily  magnesium oxide 400 milliGRAM(s) Oral daily  pantoprazole    Tablet 40 milliGRAM(s) Oral before breakfast  polyethylene glycol/electrolyte Solution 1000 milliLiter(s) Oral two times a day  torsemide 20 milliGRAM(s) Oral every 12 hours    MEDICATIONS  (PRN):  acetaminophen   Tablet .. 650 milliGRAM(s) Oral every 6 hours PRN Mild Pain (1 - 3)  dextrose 40% Gel 15 Gram(s) Oral once PRN Blood Glucose LESS THAN 70 milliGRAM(s)/deciliter  glucagon  Injectable 1 milliGRAM(s) IntraMuscular once PRN Glucose LESS THAN 70 milligrams/deciliter  ondansetron Injectable 4 milliGRAM(s) IV Push every 6 hours PRN Nausea and/or Vomiting    LABS:  CAPILLARY BLOOD GLUCOSE  POCT Blood Glucose.: 279 mg/dL (21 Feb 2019 17:02)  POCT Blood Glucose.: 140 mg/dL (21 Feb 2019 12:10)  POCT Blood Glucose.: 154 mg/dL (21 Feb 2019 07:44)  POCT Blood Glucose.: 269 mg/dL (20 Feb 2019 22:10)                          8.1    4.0   )-----------( 118      ( 21 Feb 2019 15:09 )             24.9     02-21    144  |  102  |  55.0<H>  ----------------------------<  121<H>  4.9   |  37.0<H>  |  1.16    Ca    8.1<L>      21 Feb 2019 15:09      PT/INR - ( 21 Feb 2019 15:09 )   PT: 14.1 sec;   INR: 1.22 ratio      RADIOLOGY & ADDITIONAL STUDIES:  Reviewed

## 2019-02-21 NOTE — PROGRESS NOTE ADULT - SUBJECTIVE AND OBJECTIVE BOX
Pt seen and examined f/u for recurrant blood in stool    This morning she has no complaints. Nurses report one BM over night with a small amount of blood in it. She got one unit PRBC yesterday with repeat H/H pending.    REVIEW OF SYSTEMS:    CONSTITUTIONAL: No fever, weight loss, or fatigue  EYES: No eye pain, visual disturbances, or discharge  ENMT:  No difficulty hearing, tinnitus, vertigo; No sinus or throat pain  RESPIRATORY: No cough, wheezing, chills or hemoptysis; No shortness of breath  CARDIOVASCULAR: No chest pain, palpitations, dizziness, or leg swelling  GASTROINTESTINAL: as above    MEDICATIONS:  MEDICATIONS  (STANDING):  ALBUTerol    0.083% 10 milliGRAM(s) Nebulizer once  amLODIPine   Tablet 10 milliGRAM(s) Oral daily  atorvastatin 80 milliGRAM(s) Oral at bedtime  bisacodyl 10 milliGRAM(s) Oral once  carvedilol 25 milliGRAM(s) Oral every 12 hours  dextrose 5%. 1000 milliLiter(s) (50 mL/Hr) IV Continuous <Continuous>  dextrose 50% Injectable 12.5 Gram(s) IV Push once  dextrose 50% Injectable 25 Gram(s) IV Push once  dextrose 50% Injectable 25 Gram(s) IV Push once  docusate sodium 100 milliGRAM(s) Oral two times a day  DULoxetine 60 milliGRAM(s) Oral daily  epoetin sara Injectable 90113 Unit(s) SubCutaneous <User Schedule>  gabapentin 100 milliGRAM(s) Oral three times a day  insulin glargine Injectable (LANTUS) 5 Unit(s) SubCutaneous at bedtime  insulin lispro (HumaLOG) corrective regimen sliding scale   SubCutaneous three times a day before meals  isosorbide   mononitrate ER Tablet (IMDUR) 60 milliGRAM(s) Oral daily  magnesium oxide 400 milliGRAM(s) Oral daily  pantoprazole    Tablet 40 milliGRAM(s) Oral before breakfast  polyethylene glycol/electrolyte Solution 1000 milliLiter(s) Oral two times a day  torsemide 20 milliGRAM(s) Oral every 12 hours    MEDICATIONS  (PRN):  acetaminophen   Tablet .. 650 milliGRAM(s) Oral every 6 hours PRN Mild Pain (1 - 3)  dextrose 40% Gel 15 Gram(s) Oral once PRN Blood Glucose LESS THAN 70 milliGRAM(s)/deciliter  glucagon  Injectable 1 milliGRAM(s) IntraMuscular once PRN Glucose LESS THAN 70 milligrams/deciliter  ondansetron Injectable 4 milliGRAM(s) IV Push every 6 hours PRN Nausea and/or Vomiting      Allergies    Accupril (Other)    Intolerances        Vital Signs Last 24 Hrs  T(C): 37 (21 Feb 2019 08:04), Max: 37.1 (20 Feb 2019 15:52)  T(F): 98.6 (21 Feb 2019 08:04), Max: 98.8 (20 Feb 2019 15:52)  HR: 69 (21 Feb 2019 08:04) (66 - 74)  BP: 118/69 (21 Feb 2019 08:04) (111/62 - 133/66)  BP(mean): --  RR: 18 (21 Feb 2019 08:04) (18 - 18)  SpO2: 97% (21 Feb 2019 08:04) (97% - 99%)    02-20 @ 07:01  -  02-21 @ 07:00  --------------------------------------------------------  IN: 0 mL / OUT: 400 mL / NET: -400 mL        PHYSICAL EXAM:    General: morbidly obese black female; in no acute distress, blind  HEENT: MMM, conjunctiva and sclera clear, right corneal opacification  Gastrointestinal:Abdomen: Soft non-tender non-distended; Normal bowel sounds; No hepatosplenomegaly  Extremities: no cyanosis, clubbing or edema.  Skin: Warm and dry. No obvious rash    LABS:      CBC Full  -  ( 20 Feb 2019 12:03 )  WBC Count : 4.5 K/uL  Hemoglobin : 7.5 g/dL  Hematocrit : 23.6 %  Platelet Count - Automated : 101 K/uL  Mean Cell Volume : 92.9 fl  Mean Cell Hemoglobin : 29.5 pg  Mean Cell Hemoglobin Concentration : 31.8 g/dL  Auto Neutrophil # : x  Auto Lymphocyte # : x  Auto Monocyte # : x  Auto Eosinophil # : x  Auto Basophil # : x  Auto Neutrophil % : x  Auto Lymphocyte % : x  Auto Monocyte % : x  Auto Eosinophil % : x  Auto Basophil % : x    02-20    x   |  x   |  x   ----------------------------<  x   5.6<H>   |  x   |  x     Ca    7.8<L>      20 Feb 2019 08:04

## 2019-02-21 NOTE — PROGRESS NOTE ADULT - ASSESSMENT
Pt is a 62 yo presenting to ED w/ SOB 2/2 diastolic HF exacerbation and noted to have KEITH and hyperkalemia, PMH CAD s/p CABG and PCI, PAD s/p baloon angioplasty, DM2, HTN, HLD, hx of cardiac arrest, recent GI bleed last month, Diastolic CHF, legally blind, pleural effusion requiring thoracentesis in Dec 2018.     1) Lower GI Bleed  - s/p 1 unit of PRBCs  - Hold Aspirin and Heparin (for DVT Prophylaxis)  - GI Consult appreciated  - Colonoscopy in am  2) Hyperkalemia  - Hold Losartan  3) Acute on Chronic Diastolic Heart Failure  - Improved   - Lasix Infusion switched to Demadex 20 mg BID    - Hold Losartan due to hyperkalemia   - Cardiology input appreciated   - ECHO shows EF of > 75%. Moderate to Severe TR  4) Acute on Chronic Hypercapnic Respiratory Failure  - H/O OHS/KYREE  - Continue NIV (AVAPS) Nocturnal and PRN (will need on discharge)  - ICU Consult appreciated.   - Pulmonary input appreciated  - Avoid Narcotics for pain  5) Acute on chronic kidney injury  - Improved  - Avoid nephrotoxic medications  - Renal input appreciated  6) CAD / PAD / HLD / HTN  - Continue Aspirin, Imdur, Lipitor, Coreg and Norvasc   - Plavix on hold since last admission due to GI Bleed  - Aspirin held today due to recurrent GI Bleed  7) DM 2  - HbA1c 5.6  - Accu checks and ISS  - Added Lantus 5 units at bedtime  8) Anemia  - Multifactorial (chronic, now complicated with acute bleeding)  - Plan as above  - s/p Venofer   - Continue Procrit and Ferrous Sulfate 325 mg   9) Transaminitis  - Likely secondary to liver congestion  - Improved  10) History of Depression  - Continue Duloxetine 60 mg   11) IV infiltration LUE  - Arm elevation  - Warm compresses   - No DVT     DVT Prophylaxis -- IPC     Dispo: [facility]. Will need NIV on discharge.

## 2019-02-22 LAB
ANION GAP SERPL CALC-SCNC: 6 MMOL/L — SIGNIFICANT CHANGE UP (ref 5–17)
BUN SERPL-MCNC: 52 MG/DL — HIGH (ref 8–20)
CALCIUM SERPL-MCNC: 7.9 MG/DL — LOW (ref 8.6–10.2)
CHLORIDE SERPL-SCNC: 100 MMOL/L — SIGNIFICANT CHANGE UP (ref 98–107)
CO2 SERPL-SCNC: 37 MMOL/L — HIGH (ref 22–29)
CREAT SERPL-MCNC: 1.07 MG/DL — SIGNIFICANT CHANGE UP (ref 0.5–1.3)
GLUCOSE BLDC GLUCOMTR-MCNC: 167 MG/DL — HIGH (ref 70–99)
GLUCOSE BLDC GLUCOMTR-MCNC: 178 MG/DL — HIGH (ref 70–99)
GLUCOSE BLDC GLUCOMTR-MCNC: 244 MG/DL — HIGH (ref 70–99)
GLUCOSE BLDC GLUCOMTR-MCNC: 310 MG/DL — HIGH (ref 70–99)
GLUCOSE SERPL-MCNC: 268 MG/DL — HIGH (ref 70–115)
HCT VFR BLD CALC: 24.6 % — LOW (ref 37–47)
HGB BLD-MCNC: 7.9 G/DL — LOW (ref 12–16)
MAGNESIUM SERPL-MCNC: 2.2 MG/DL — SIGNIFICANT CHANGE UP (ref 1.6–2.6)
MCHC RBC-ENTMCNC: 29.7 PG — SIGNIFICANT CHANGE UP (ref 27–31)
MCHC RBC-ENTMCNC: 32.1 G/DL — SIGNIFICANT CHANGE UP (ref 32–36)
MCV RBC AUTO: 92.5 FL — SIGNIFICANT CHANGE UP (ref 81–99)
PF4 HEPARIN CMPLX AB SER-ACNC: NEGATIVE — SIGNIFICANT CHANGE UP
PF4 HEPARIN CMPLX AB SERPL QL IA: <0.6 U/ML — SIGNIFICANT CHANGE UP (ref 0–0.9)
PLATELET # BLD AUTO: 136 K/UL — LOW (ref 150–400)
POTASSIUM SERPL-MCNC: 4.4 MMOL/L — SIGNIFICANT CHANGE UP (ref 3.5–5.3)
POTASSIUM SERPL-SCNC: 4.4 MMOL/L — SIGNIFICANT CHANGE UP (ref 3.5–5.3)
RBC # BLD: 2.66 M/UL — LOW (ref 4.4–5.2)
RBC # FLD: 16.6 % — HIGH (ref 11–15.6)
SODIUM SERPL-SCNC: 143 MMOL/L — SIGNIFICANT CHANGE UP (ref 135–145)
WBC # BLD: 4.7 K/UL — LOW (ref 4.8–10.8)
WBC # FLD AUTO: 4.7 K/UL — LOW (ref 4.8–10.8)

## 2019-02-22 PROCEDURE — 45378 DIAGNOSTIC COLONOSCOPY: CPT

## 2019-02-22 PROCEDURE — 12345: CPT | Mod: NC

## 2019-02-22 PROCEDURE — 36000 PLACE NEEDLE IN VEIN: CPT | Mod: 59

## 2019-02-22 PROCEDURE — 36569 INSJ PICC 5 YR+ W/O IMAGING: CPT

## 2019-02-22 PROCEDURE — 99232 SBSQ HOSP IP/OBS MODERATE 35: CPT

## 2019-02-22 PROCEDURE — 76937 US GUIDE VASCULAR ACCESS: CPT | Mod: 26,59

## 2019-02-22 RX ORDER — INSULIN GLARGINE 100 [IU]/ML
8 INJECTION, SOLUTION SUBCUTANEOUS AT BEDTIME
Qty: 0 | Refills: 0 | Status: DISCONTINUED | OUTPATIENT
Start: 2019-02-22 | End: 2019-02-23

## 2019-02-22 RX ADMIN — AMLODIPINE BESYLATE 10 MILLIGRAM(S): 2.5 TABLET ORAL at 04:58

## 2019-02-22 RX ADMIN — CARVEDILOL PHOSPHATE 25 MILLIGRAM(S): 80 CAPSULE, EXTENDED RELEASE ORAL at 17:25

## 2019-02-22 RX ADMIN — GABAPENTIN 100 MILLIGRAM(S): 400 CAPSULE ORAL at 14:09

## 2019-02-22 RX ADMIN — Medication 1000 MILLILITER(S): at 04:48

## 2019-02-22 RX ADMIN — Medication 100 MILLIGRAM(S): at 04:58

## 2019-02-22 RX ADMIN — ATORVASTATIN CALCIUM 80 MILLIGRAM(S): 80 TABLET, FILM COATED ORAL at 21:08

## 2019-02-22 RX ADMIN — ISOSORBIDE MONONITRATE 60 MILLIGRAM(S): 60 TABLET, EXTENDED RELEASE ORAL at 14:09

## 2019-02-22 RX ADMIN — Medication 20 MILLIGRAM(S): at 17:25

## 2019-02-22 RX ADMIN — Medication 100 MILLIGRAM(S): at 17:25

## 2019-02-22 RX ADMIN — Medication 2: at 17:24

## 2019-02-22 RX ADMIN — Medication 20 MILLIGRAM(S): at 04:58

## 2019-02-22 RX ADMIN — MAGNESIUM OXIDE 400 MG ORAL TABLET 400 MILLIGRAM(S): 241.3 TABLET ORAL at 14:09

## 2019-02-22 RX ADMIN — GABAPENTIN 100 MILLIGRAM(S): 400 CAPSULE ORAL at 04:58

## 2019-02-22 RX ADMIN — INSULIN GLARGINE 8 UNIT(S): 100 INJECTION, SOLUTION SUBCUTANEOUS at 21:11

## 2019-02-22 RX ADMIN — DULOXETINE HYDROCHLORIDE 60 MILLIGRAM(S): 30 CAPSULE, DELAYED RELEASE ORAL at 14:09

## 2019-02-22 RX ADMIN — PANTOPRAZOLE SODIUM 40 MILLIGRAM(S): 20 TABLET, DELAYED RELEASE ORAL at 04:59

## 2019-02-22 RX ADMIN — GABAPENTIN 100 MILLIGRAM(S): 400 CAPSULE ORAL at 21:08

## 2019-02-22 RX ADMIN — CARVEDILOL PHOSPHATE 25 MILLIGRAM(S): 80 CAPSULE, EXTENDED RELEASE ORAL at 04:58

## 2019-02-22 NOTE — PROCEDURE NOTE - NSPERFORMEDBY_GEN_A_CORE
4th attempt to contact ALICIA Nevarez RN will forward to Ashlie Garcia CNP, as she will be in office on Wednesday. Perhaps she can call him with abnormal lab result from [facility]: 6/30/17.( see earlier RN entry from today regarding blood work from last Friday's appt. ).....................JUAN MANUEL Shelton     PA/Myself

## 2019-02-22 NOTE — PROGRESS NOTE ADULT - SUBJECTIVE AND OBJECTIVE BOX
The patient is a 63y old female who presents with a complaint of SOB.  She is positive for occult  blood in her stool.  She is scheduled to have a COLONOSCOPY.                 (2019 19:56)      PAST MEDICAL HISTORY:  L.V.E.F. = 75+ TTE  2/15/2019  Acute diastolic congestive heart failure  Severe pulmonary hypertension  Severely elevated wedge pressure  Hypertension x 20 years  Diabetes x 20 years  Herniated cervical disc  Hyperlipidemia  Legally blind  History of retinal detachment  Glaucoma  Obese  BMI = 36.1        PAST SURGICAL HISTORY:  CABG x 1    Cataract  Uveitic glaucoma of both eyes   section  Carotid endarterectomy    MEDICATIONS  (STANDING):  ALBUTerol    0.083% 10 milliGRAM(s) Nebulizer once  amLODIPine   Tablet 10 milliGRAM(s) Oral daily  atorvastatin 80 milliGRAM(s) Oral at bedtime  carvedilol 25 milliGRAM(s) Oral every 12 hours  dextrose 5%. 1000 milliLiter(s) (50 mL/Hr) IV Continuous <Continuous>  dextrose 50% Injectable 12.5 Gram(s) IV Push once  dextrose 50% Injectable 25 Gram(s) IV Push once  dextrose 50% Injectable 25 Gram(s) IV Push once  docusate sodium 100 milliGRAM(s) Oral two times a day  DULoxetine 60 milliGRAM(s) Oral daily  epoetin sara Injectable 63230 Unit(s) SubCutaneous <User Schedule>  gabapentin 100 milliGRAM(s) Oral three times a day  insulin glargine Injectable (LANTUS) 5 Unit(s) SubCutaneous at bedtime  insulin lispro (HumaLOG) corrective regimen sliding scale   SubCutaneous three times a day before meals  isosorbide   mononitrate ER Tablet (IMDUR) 60 milliGRAM(s) Oral daily  magnesium oxide 400 milliGRAM(s) Oral daily  pantoprazole    Tablet 40 milliGRAM(s) Oral before breakfast  torsemide 20 milliGRAM(s) Oral every 12 hours    MEDICATIONS  (PRN):  acetaminophen   Tablet .. 650 milliGRAM(s) Oral every 6 hours PRN Mild Pain (1 - 3)  dextrose 40% Gel 15 Gram(s) Oral once PRN Blood Glucose LESS THAN 70 milliGRAM(s)/deciliter  glucagon  Injectable 1 milliGRAM(s) IntraMuscular once PRN Glucose LESS THAN 70 milligrams/deciliter  ondansetron Injectable 4 milliGRAM(s) IV Push every 6 hours PRN Nausea and/or Vomiting      Allergies:    Accupril (Other)      SOCIAL HISTORY:  The patient does not drink alcohol, smoke or use illicit drugs.                        8.1    4.0   )-----------( 118      ( 2019 15:09 )             24.9     PT/INR - ( 2019 15:09 )   PT: 14.1 sec;   INR: 1.22 ratio         PTT - ( 10 Feb 2019 22:15 )  PTT:30.4 sec        144  |  102  |  55.0<H>  ----------------------------<  121<H>  4.9   |  37.0<H>  |  1.16    Ca    8.1<L>      2019 15:09    EKG:  -  2/15/2019  Normal sinus rhythm  Cannot rule out Anterior infarct , age undetermined    TTE  -  2/15/2019  Summary:   1. Technically suboptimal study.   2. Left ventricular ejection fraction, by visual estimation, is >75%.   3. Hyperdynamic global left ventricular systolic function.   4. There is mild concentric left ventricular hypertrophy.   5. Mild mitral annular calcification.   6. Moderate mitral valve regurgitation.   7. Thickening and calcification of the anterior and posterior mitral        valve leaflets.   8. Moderate-severe tricuspid regurgitation.   9. Estimated pulmonary artery systolic pressure is 55.7 mmHg assuming a        right atrial pressure of 5 mmHg, which is consistent with moderate        pulmonary hypertension.  10. Endocardial visualization was enhanced with intravenous echo contrast.    ASA # = 4 Mallampati # = 3          The patients 02 saturation sitting up at 45 degrees on room                                                       air = 85 to 86%.  On 3 liters oxygen per nasal cannula her                                                        sat is 96%.

## 2019-02-22 NOTE — PROCEDURE NOTE - NSPROCDETAILS_GEN_ALL_CORE
secured in place/blood seen on insertion/flushes easily/dressing applied/sterile technique, catheter placed/ultrasound utilization/location identified, draped/prepped, sterile technique used
supine position/ultrasound assessment/sterile technique, catheter placed/ultrasound guidance/location identified, draped/prepped, sterile technique used/sterile dressing applied

## 2019-02-22 NOTE — PROGRESS NOTE ADULT - SUBJECTIVE AND OBJECTIVE BOX
Heart failure    HPI:  Pt is a 64 yo presenting to ED w/ SOB, PMH CAD s/p CABG and PCI, PAD s/p baloon angioplasty, DM2, HTN, HLD, hx of cardiac arrest, recent GI bleed last month, Diastolic CHF, legally blind, pleural effusion requiring thoracentesis in Dec 2018.   Patient states that she has been having increasing SOB over the last week, she lives at home with her family, denies sick contacts, but has been having more exertional dyspnea. She states she walks with a walker but it is taking less activity for her dyspnea to occur. She has been compliant with all of her medications, she does not recall the names, but states that there have been no changes to her medication regimen since her discharge. She denies any associated chest pain or pressure. She has been taking her tosemide she believes. Denies PND or orthopnea, but she has noted increased LE edema.   Denies headaches, nausea, vomiting, chest pain, palpitations, abdominal pain, constipation, diarrhea, melena, hematochezia, dysuria.   Her SOB has since resolved. She is breathing comfortably, has no complaints.  In ED patient was noted to have hyperkalemia on repeat labs, troponin elevated at 0.09. Her creatinine is also elevated. She did not receive any potassium treatment initially so I ordered Calcium gluc, kayexelate, Insulin, dextrose, Albuterol. EKG without widened QRS or peaked T waves. (11 Feb 2019 03:58)    Interval History:  Patient was seen and examined at bedside around 8 am. Doing well.    Denies chest pain, palpitations, shortness of breath, headache, dizziness, visual symptoms, nausea, vomiting or abdominal pain.    ROS:  As per interval history otherwise unremarkable.    PHYSICAL EXAM:  Vital Signs   T(C): 36.6 (22 Feb 2019 15:43), Max: 36.9 (21 Feb 2019 23:58)  T(F): 97.9 (22 Feb 2019 15:43), Max: 98.5 (21 Feb 2019 23:58)  HR: 69 (22 Feb 2019 15:43) (62 - 69)  BP: 132/68 (22 Feb 2019 15:43) (120/63 - 132/68)  RR: 18 (22 Feb 2019 15:43) (18 - 19)  SpO2: 98% (22 Feb 2019 15:43) (96% - 100%)  General: Well developed. Well nourished. No acute distress  HEENT: Opaque right cornea. EOMI. Clear conjunctivae. Moist mucus membrane  Neck: Supple. No JVD.   Chest: Decreased air entry at bases. No wheezing, rales or rhonchi.   Heart: Normal S1 & S2. RRR. No murmur.   Abdomen: Soft. Non-tender. Non-distended. + BS. Abdominal wall edema.   Ext: 2+ pedal edema. No calf tenderness. 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 Dry scab on right heel. Old healed scars on left leg. Swelling in LUE due to IV infiltration. Neurovascular intact.   Neuro: Active and alert. No focal deficit. No speech disorder  Skin: Warm and Dry  Psychiatry: Normal mood and affect    MEDICATIONS  (STANDING):  ALBUTerol    0.083% 10 milliGRAM(s) Nebulizer once  amLODIPine   Tablet 10 milliGRAM(s) Oral daily  atorvastatin 80 milliGRAM(s) Oral at bedtime  carvedilol 25 milliGRAM(s) Oral every 12 hours  dextrose 5%. 1000 milliLiter(s) (50 mL/Hr) IV Continuous <Continuous>  dextrose 50% Injectable 12.5 Gram(s) IV Push once  dextrose 50% Injectable 25 Gram(s) IV Push once  dextrose 50% Injectable 25 Gram(s) IV Push once  docusate sodium 100 milliGRAM(s) Oral two times a day  DULoxetine 60 milliGRAM(s) Oral daily  epoetin sara Injectable 31944 Unit(s) SubCutaneous <User Schedule>  gabapentin 100 milliGRAM(s) Oral three times a day  insulin glargine Injectable (LANTUS) 5 Unit(s) SubCutaneous at bedtime  insulin lispro (HumaLOG) corrective regimen sliding scale   SubCutaneous three times a day before meals  isosorbide   mononitrate ER Tablet (IMDUR) 60 milliGRAM(s) Oral daily  magnesium oxide 400 milliGRAM(s) Oral daily  pantoprazole    Tablet 40 milliGRAM(s) Oral before breakfast  torsemide 20 milliGRAM(s) Oral every 12 hours    MEDICATIONS  (PRN):  acetaminophen   Tablet .. 650 milliGRAM(s) Oral every 6 hours PRN Mild Pain (1 - 3)  dextrose 40% Gel 15 Gram(s) Oral once PRN Blood Glucose LESS THAN 70 milliGRAM(s)/deciliter  glucagon  Injectable 1 milliGRAM(s) IntraMuscular once PRN Glucose LESS THAN 70 milligrams/deciliter  ondansetron Injectable 4 milliGRAM(s) IV Push every 6 hours PRN Nausea and/or Vomiting    LABS:  CAPILLARY BLOOD GLUCOSE  POCT Blood Glucose.: 244 mg/dL (22 Feb 2019 17:23)  POCT Blood Glucose.: 167 mg/dL (22 Feb 2019 11:44)  POCT Blood Glucose.: 178 mg/dL (22 Feb 2019 07:57)  POCT Blood Glucose.: 239 mg/dL (21 Feb 2019 20:53)                        8.1    4.0   )-----------( 118      ( 21 Feb 2019 15:09 )             24.9     02-21    144  |  102  |  55.0<H>  ----------------------------<  121<H>  4.9   |  37.0<H>  |  1.16    Ca    8.1<L>      21 Feb 2019 15:09      PT/INR - ( 21 Feb 2019 15:09 )   PT: 14.1 sec;   INR: 1.22 ratio         RADIOLOGY & ADDITIONAL STUDIES:  Reviewed

## 2019-02-22 NOTE — PROCEDURE NOTE - NSPOSTCAREGUIDE_GEN_A_CORE
Instructed patient/caregiver regarding signs and symptoms of infection/Instructed patient/caregiver to follow-up with primary care physician/Keep the cast/splint/dressing clean and dry/Verbal/written post procedure instructions were given to patient/caregiver/Care for catheter as per unit/ICU protocols
Verbal/written post procedure instructions were given to patient/caregiver/Instructed patient/caregiver to follow-up with primary care physician/Care for catheter as per unit/ICU protocols/Instructed patient/caregiver regarding signs and symptoms of infection

## 2019-02-22 NOTE — PROGRESS NOTE ADULT - SUBJECTIVE AND OBJECTIVE BOX
NEPHROLOGY INTERVAL HPI/OVERNIGHT EVENTS:  pt comfortable when seen earlier  no acute distress noted    MEDICATIONS  (STANDING):  ALBUTerol    0.083% 10 milliGRAM(s) Nebulizer once  amLODIPine   Tablet 10 milliGRAM(s) Oral daily  atorvastatin 80 milliGRAM(s) Oral at bedtime  carvedilol 25 milliGRAM(s) Oral every 12 hours  dextrose 5%. 1000 milliLiter(s) (50 mL/Hr) IV Continuous <Continuous>  dextrose 50% Injectable 12.5 Gram(s) IV Push once  dextrose 50% Injectable 25 Gram(s) IV Push once  dextrose 50% Injectable 25 Gram(s) IV Push once  docusate sodium 100 milliGRAM(s) Oral two times a day  DULoxetine 60 milliGRAM(s) Oral daily  epoetin sara Injectable 67306 Unit(s) SubCutaneous <User Schedule>  gabapentin 100 milliGRAM(s) Oral three times a day  insulin glargine Injectable (LANTUS) 5 Unit(s) SubCutaneous at bedtime  insulin lispro (HumaLOG) corrective regimen sliding scale   SubCutaneous three times a day before meals  isosorbide   mononitrate ER Tablet (IMDUR) 60 milliGRAM(s) Oral daily  magnesium oxide 400 milliGRAM(s) Oral daily  pantoprazole    Tablet 40 milliGRAM(s) Oral before breakfast  torsemide 20 milliGRAM(s) Oral every 12 hours    MEDICATIONS  (PRN):  acetaminophen   Tablet .. 650 milliGRAM(s) Oral every 6 hours PRN Mild Pain (1 - 3)  dextrose 40% Gel 15 Gram(s) Oral once PRN Blood Glucose LESS THAN 70 milliGRAM(s)/deciliter  glucagon  Injectable 1 milliGRAM(s) IntraMuscular once PRN Glucose LESS THAN 70 milligrams/deciliter  ondansetron Injectable 4 milliGRAM(s) IV Push every 6 hours PRN Nausea and/or Vomiting      Allergies    Accupril (Other)    Intolerances          Vital Signs Last 24 Hrs  T(C): 36.6 (22 Feb 2019 08:23), Max: 36.9 (21 Feb 2019 23:58)  T(F): 97.8 (22 Feb 2019 08:23), Max: 98.5 (21 Feb 2019 23:58)  HR: 62 (22 Feb 2019 08:23) (62 - 65)  BP: 127/78 (22 Feb 2019 08:23) (120/63 - 127/78)  BP(mean): --  RR: 18 (22 Feb 2019 08:23) (18 - 108)  SpO2: 96% (22 Feb 2019 08:23) (96% - 100%)    PHYSICAL EXAM:  Comfortable  Cardiovascular: Normal S1 S2, No rub  Respiratory: Clear; diminished BS at bases	  Neuro: A & O x 3; non focal  Gastrointestinal:  Soft, Non-tender, + BS	  Extremities: trace LE edema    LABS:                        8.1    4.0   )-----------( 118      ( 21 Feb 2019 15:09 )             24.9     02-21    144  |  102  |  55.0<H>  ----------------------------<  121<H>  4.9   |  37.0<H>  |  1.16    Ca    8.1<L>      21 Feb 2019 15:09      PT/INR - ( 21 Feb 2019 15:09 )   PT: 14.1 sec;   INR: 1.22 ratio       Protein/Creatinine Ratio Calculation: 1.1 Ratio (02.15.19 @ 06:02)              RADIOLOGY & ADDITIONAL TESTS:

## 2019-02-22 NOTE — PROGRESS NOTE ADULT - ASSESSMENT
ARF: decompensated CHF (R > L)  1.1g proteinuria  - cont Torsemide  - daily weights and adjust dose accordingly==> will need to tolerate azotemia  - monitor labs    Anemia: +CRF +GIB  s/p IV Fe  - cont QUENTIN  - monitor H/H; target Hgb=10.0  - PRBCs as needed  - colonoscopy planned today

## 2019-02-22 NOTE — PROCEDURE NOTE - PROCEDURE
<<-----Click on this checkbox to enter Procedure Vascular access with ultrasound guidance  02/22/2019  Patent left basilic vein  Active  JSTEELE  Peripheral insertion of midline catheter  02/22/2019  4FR  14CM  length  BARD POWER MIDLINE  ns flush good heme back left basilic vein  Active  JSTEELE

## 2019-02-22 NOTE — BRIEF OPERATIVE NOTE - COMMENTS
Poor prep throughout the colon. Diverticulosis noted in left colon. Prep inadequate to exclude AVM's or small polyps.

## 2019-02-23 DIAGNOSIS — K62.5 HEMORRHAGE OF ANUS AND RECTUM: ICD-10-CM

## 2019-02-23 LAB
ANION GAP SERPL CALC-SCNC: 6 MMOL/L — SIGNIFICANT CHANGE UP (ref 5–17)
BASOPHILS # BLD AUTO: 0 K/UL — SIGNIFICANT CHANGE UP (ref 0–0.2)
BASOPHILS NFR BLD AUTO: 0.5 % — SIGNIFICANT CHANGE UP (ref 0–2)
BUN SERPL-MCNC: 54 MG/DL — HIGH (ref 8–20)
CALCIUM SERPL-MCNC: 7.8 MG/DL — LOW (ref 8.6–10.2)
CHLORIDE SERPL-SCNC: 100 MMOL/L — SIGNIFICANT CHANGE UP (ref 98–107)
CO2 SERPL-SCNC: 37 MMOL/L — HIGH (ref 22–29)
CREAT SERPL-MCNC: 1.04 MG/DL — SIGNIFICANT CHANGE UP (ref 0.5–1.3)
EOSINOPHIL # BLD AUTO: 0.1 K/UL — SIGNIFICANT CHANGE UP (ref 0–0.5)
EOSINOPHIL NFR BLD AUTO: 2 % — SIGNIFICANT CHANGE UP (ref 0–6)
GLUCOSE BLDC GLUCOMTR-MCNC: 187 MG/DL — HIGH (ref 70–99)
GLUCOSE BLDC GLUCOMTR-MCNC: 227 MG/DL — HIGH (ref 70–99)
GLUCOSE BLDC GLUCOMTR-MCNC: 276 MG/DL — HIGH (ref 70–99)
GLUCOSE BLDC GLUCOMTR-MCNC: 315 MG/DL — HIGH (ref 70–99)
GLUCOSE BLDC GLUCOMTR-MCNC: 387 MG/DL — HIGH (ref 70–99)
GLUCOSE SERPL-MCNC: 212 MG/DL — HIGH (ref 70–115)
HCT VFR BLD CALC: 24.7 % — LOW (ref 37–47)
HGB BLD-MCNC: 7.9 G/DL — LOW (ref 12–16)
LYMPHOCYTES # BLD AUTO: 0.6 K/UL — LOW (ref 1–4.8)
LYMPHOCYTES # BLD AUTO: 15.6 % — LOW (ref 20–55)
MAGNESIUM SERPL-MCNC: 2.3 MG/DL — SIGNIFICANT CHANGE UP (ref 1.8–2.6)
MCHC RBC-ENTMCNC: 29.4 PG — SIGNIFICANT CHANGE UP (ref 27–31)
MCHC RBC-ENTMCNC: 32 G/DL — SIGNIFICANT CHANGE UP (ref 32–36)
MCV RBC AUTO: 91.8 FL — SIGNIFICANT CHANGE UP (ref 81–99)
MONOCYTES # BLD AUTO: 0.4 K/UL — SIGNIFICANT CHANGE UP (ref 0–0.8)
MONOCYTES NFR BLD AUTO: 8.6 % — SIGNIFICANT CHANGE UP (ref 3–10)
NEUTROPHILS # BLD AUTO: 3 K/UL — SIGNIFICANT CHANGE UP (ref 1.8–8)
NEUTROPHILS NFR BLD AUTO: 73.3 % — HIGH (ref 37–73)
PHOSPHATE SERPL-MCNC: 2.3 MG/DL — LOW (ref 2.4–4.7)
PLATELET # BLD AUTO: 133 K/UL — LOW (ref 150–400)
POTASSIUM SERPL-MCNC: 4.4 MMOL/L — SIGNIFICANT CHANGE UP (ref 3.5–5.3)
POTASSIUM SERPL-SCNC: 4.4 MMOL/L — SIGNIFICANT CHANGE UP (ref 3.5–5.3)
RBC # BLD: 2.69 M/UL — LOW (ref 4.4–5.2)
RBC # FLD: 16.7 % — HIGH (ref 11–15.6)
SODIUM SERPL-SCNC: 143 MMOL/L — SIGNIFICANT CHANGE UP (ref 135–145)
WBC # BLD: 4 K/UL — LOW (ref 4.8–10.8)
WBC # FLD AUTO: 4 K/UL — LOW (ref 4.8–10.8)

## 2019-02-23 PROCEDURE — 99233 SBSQ HOSP IP/OBS HIGH 50: CPT

## 2019-02-23 PROCEDURE — 93010 ELECTROCARDIOGRAM REPORT: CPT

## 2019-02-23 PROCEDURE — 99232 SBSQ HOSP IP/OBS MODERATE 35: CPT

## 2019-02-23 RX ORDER — SODIUM CHLORIDE 9 MG/ML
1000 INJECTION, SOLUTION INTRAVENOUS
Qty: 0 | Refills: 0 | Status: DISCONTINUED | OUTPATIENT
Start: 2019-02-23 | End: 2019-02-24

## 2019-02-23 RX ADMIN — MAGNESIUM OXIDE 400 MG ORAL TABLET 400 MILLIGRAM(S): 241.3 TABLET ORAL at 11:35

## 2019-02-23 RX ADMIN — Medication 20 MILLIGRAM(S): at 05:13

## 2019-02-23 RX ADMIN — Medication 650 MILLIGRAM(S): at 11:36

## 2019-02-23 RX ADMIN — ISOSORBIDE MONONITRATE 60 MILLIGRAM(S): 60 TABLET, EXTENDED RELEASE ORAL at 11:35

## 2019-02-23 RX ADMIN — CARVEDILOL PHOSPHATE 25 MILLIGRAM(S): 80 CAPSULE, EXTENDED RELEASE ORAL at 18:52

## 2019-02-23 RX ADMIN — GABAPENTIN 100 MILLIGRAM(S): 400 CAPSULE ORAL at 14:05

## 2019-02-23 RX ADMIN — Medication 2: at 11:35

## 2019-02-23 RX ADMIN — SODIUM CHLORIDE 65 MILLILITER(S): 9 INJECTION, SOLUTION INTRAVENOUS at 16:49

## 2019-02-23 RX ADMIN — Medication 650 MILLIGRAM(S): at 12:36

## 2019-02-23 RX ADMIN — GABAPENTIN 100 MILLIGRAM(S): 400 CAPSULE ORAL at 21:09

## 2019-02-23 RX ADMIN — Medication 100 MILLIGRAM(S): at 18:51

## 2019-02-23 RX ADMIN — Medication 1: at 07:56

## 2019-02-23 RX ADMIN — PANTOPRAZOLE SODIUM 40 MILLIGRAM(S): 20 TABLET, DELAYED RELEASE ORAL at 05:13

## 2019-02-23 RX ADMIN — AMLODIPINE BESYLATE 10 MILLIGRAM(S): 2.5 TABLET ORAL at 05:12

## 2019-02-23 RX ADMIN — DULOXETINE HYDROCHLORIDE 60 MILLIGRAM(S): 30 CAPSULE, DELAYED RELEASE ORAL at 11:35

## 2019-02-23 RX ADMIN — Medication 3: at 16:49

## 2019-02-23 RX ADMIN — CARVEDILOL PHOSPHATE 25 MILLIGRAM(S): 80 CAPSULE, EXTENDED RELEASE ORAL at 05:12

## 2019-02-23 RX ADMIN — GABAPENTIN 100 MILLIGRAM(S): 400 CAPSULE ORAL at 05:12

## 2019-02-23 RX ADMIN — SODIUM CHLORIDE 65 MILLILITER(S): 9 INJECTION, SOLUTION INTRAVENOUS at 14:05

## 2019-02-23 RX ADMIN — ATORVASTATIN CALCIUM 80 MILLIGRAM(S): 80 TABLET, FILM COATED ORAL at 21:09

## 2019-02-23 RX ADMIN — Medication 100 MILLIGRAM(S): at 05:13

## 2019-02-23 NOTE — CHART NOTE - NSCHARTNOTEFT_GEN_A_CORE
Rapid Response PGY 1 Note  Patient is a 63y old  Female  PMHx: CAD s/p CABG and PCI, PAD s/p baloon angioplasty, DM2, HTN, HLD, hx of cardiac arrest, recent GI bleed last month, Diastolic CHF, legally blind, pleural effusion requiring thoracentesis in Dec 2018. admitted for Dyspnea/GI Bleed    Rapid response team called because pt was sitting and after standing up pt lost strength, became limp and fell into the nurses arms. Patient does not remember what happened. Rapid recovery of consciousness without any irregular movements or seizure-like activity.    Patient was seen and examined at the bedside by the rapid response team.    Allergies    Accupril (Other)    PAST MEDICAL & SURGICAL HISTORY:  Herniated disc, cervical  PAD (peripheral artery disease)  Legally blind  History of peripheral vascular disease  History of retinal detachment  History of CEA (carotid endarterectomy): Right  Hyperlipidemia  Glaucoma  Hypertension  Diabetes  S/P CABG x 1  After cataract  Uveitic glaucoma of both eyes  Detached retina  Atherosclerosis of right carotid artery   delivery delivered    Vital Signs at start of Rapid (8:39 AM)  BP: 112/58 mmHg  HR: 64 bpm  RR: 16 rpm  Temp: 97.5 F  Glucose: 387 mg/dL    GENERAL: The patient is awake, weak appearance  HEENT: Head is normocephalic and atraumatic. Extraocular muscles are intact. Mucous membranes are dry.   LUNGS: Poor inspiratory effort, Has mild expiratory wheezing with mild rales/crackles at bases. No rhonchi; respirations unlabored  HEART: Distant Heart sounds, regular rate and rhythm ,+S1/+S2, no murmurs, rubs, gallops  ABDOMEN: No evidence of anasarca, Soft, nontender, and nondistended, no rebound, guarding rigidity, bowel sounds in all 4 quadrants  EXTREMITIES: Mild pedal edema +1 up to lower shin, Without any cyanosis, clubbing, rash, lesions. Chronic venous stasis changes noted.  Skin: Mild tugor.   VASCULAR: Radial and Dorsal pedal pulses palpable BL, +1 in all 4  NEUROLOGIC: Grossly intact.  PSYCH: AAOx3, speaking in full sentences and coherent, no evidence of post ictal confusion.      @ 07:01  -  - @ 07:00  --------------------------------------------------------  IN: 0 mL / OUT: 1600 mL / NET: -1600 mL                   7.9    4.0   )-----------( 133      ( 2019 10:02 )             24.7     02-23    143  |  100  |  54.0<H>  ----------------------------<  212<H>  4.4   |  37.0<H>  |  1.04    Ca    7.8<L>      2019 10:02  Phos  2.3     02  Mg     2.3          PT/INR - ( 2019 15:09 )   PT: 14.1 sec;   INR: 1.22 ratio      EKG: No interval changes.      MEDICATIONS  (STANDING):  ALBUTerol    0.083% 10 milliGRAM(s) Nebulizer once  amLODIPine   Tablet 10 milliGRAM(s) Oral daily  atorvastatin 80 milliGRAM(s) Oral at bedtime  carvedilol 25 milliGRAM(s) Oral every 12 hours  dextrose 5%. 1000 milliLiter(s) (50 mL/Hr) IV Continuous <Continuous>  dextrose 50% Injectable 12.5 Gram(s) IV Push once  dextrose 50% Injectable 25 Gram(s) IV Push once  dextrose 50% Injectable 25 Gram(s) IV Push once  docusate sodium 100 milliGRAM(s) Oral two times a day  DULoxetine 60 milliGRAM(s) Oral daily  epoetin sara Injectable 88609 Unit(s) SubCutaneous <User Schedule>  gabapentin 100 milliGRAM(s) Oral three times a day  insulin lispro (HumaLOG) corrective regimen sliding scale   SubCutaneous three times a day before meals  isosorbide   mononitrate ER Tablet (IMDUR) 60 milliGRAM(s) Oral daily  magnesium oxide 400 milliGRAM(s) Oral daily  multiple electrolytes Injection Type 1 1000 milliLiter(s) (65 mL/Hr) IV Continuous <Continuous>  pantoprazole    Tablet 40 milliGRAM(s) Oral before breakfast    MEDICATIONS  (PRN):  acetaminophen   Tablet .. 650 milliGRAM(s) Oral every 6 hours PRN Mild Pain (1 - 3)  dextrose 40% Gel 15 Gram(s) Oral once PRN Blood Glucose LESS THAN 70 milliGRAM(s)/deciliter  glucagon  Injectable 1 milliGRAM(s) IntraMuscular once PRN Glucose LESS THAN 70 milligrams/deciliter  ondansetron Injectable 4 milliGRAM(s) IV Push every 6 hours PRN Nausea and/or Vomiting      Assessment- Rapid Response called for 63y year old Female with a past medical history of CAD s/p CABG and PCI, PAD s/p baloon angioplasty, DM2, HTN, HLD, hx of cardiac arrest, recent GI bleed last month, Diastolic CHF, legally blind, pleural effusion requiring thoracentesis in Dec 2018. admitted for Dyspnea/GI Bleed.   Pt stood up, lost strength and consciousness for a few seconds. Caught by nursing staff, without any trauma. Rapid recovery without post-ictal state.  Soft BP and mild dehydration noted on exam. Hx and physical examination appear syncopal in nature.  will administer gentle fluids, orthostatics and EKG    Case discussed with SROC and Dr. Coronado  Will follow up in 1 hour    -Barney Davidson MD;  PGY-1 Rapid Response PGY 1 Note  Patient is a 63y old  Female  PMHx: CAD s/p CABG and PCI, PAD s/p baloon angioplasty, DM2, HTN, HLD, hx of cardiac arrest, recent GI bleed last month, Diastolic CHF, legally blind, pleural effusion requiring thoracentesis in Dec 2018. admitted for Dyspnea/GI Bleed    Rapid response team called because pt was sitting and after standing up pt lost strength, became limp and fell into the nurses arms. Patient does not remember what happened. Rapid recovery of consciousness without any irregular movements or seizure-like activity.    Patient was seen and examined at the bedside by the rapid response team.    Allergies    Accupril (Other)    PAST MEDICAL & SURGICAL HISTORY:  Herniated disc, cervical  PAD (peripheral artery disease)  Legally blind  History of peripheral vascular disease  History of retinal detachment  History of CEA (carotid endarterectomy): Right  Hyperlipidemia  Glaucoma  Hypertension  Diabetes  S/P CABG x 1  After cataract  Uveitic glaucoma of both eyes  Detached retina  Atherosclerosis of right carotid artery   delivery delivered    Vital Signs at start of Rapid (13:30)  BP: 112/58 mmHg  HR: 64 bpm  RR: 16 rpm  Temp: 97.5 F  Glucose: 387 mg/dL    GENERAL: The patient is awake, weak appearance  HEENT: Head is normocephalic and atraumatic. Extraocular muscles are intact. Mucous membranes are dry.   LUNGS: Poor inspiratory effort, Has mild expiratory wheezing with mild rales/crackles at bases. No rhonchi; respirations unlabored  HEART: Distant Heart sounds, regular rate and rhythm ,+S1/+S2, no murmurs, rubs, gallops  ABDOMEN: No evidence of anasarca, Soft, nontender, and nondistended, no rebound, guarding rigidity, bowel sounds in all 4 quadrants  EXTREMITIES: Mild pedal edema +1 up to lower shin, Without any cyanosis, clubbing, rash, lesions. Chronic venous stasis changes noted.  Skin: Mild tugor.   VASCULAR: Radial and Dorsal pedal pulses palpable BL, +1 in all 4  NEUROLOGIC: Grossly intact.  PSYCH: AAOx3, speaking in full sentences and coherent, no evidence of post ictal confusion.      @ 07:01  -  - @ 07:00  --------------------------------------------------------  IN: 0 mL / OUT: 1600 mL / NET: -1600 mL                   7.9    4.0   )-----------( 133      ( 2019 10:02 )             24.7     02-23    143  |  100  |  54.0<H>  ----------------------------<  212<H>  4.4   |  37.0<H>  |  1.04    Ca    7.8<L>      2019 10:02  Phos  2.3     02  Mg     2.3          PT/INR - ( 2019 15:09 )   PT: 14.1 sec;   INR: 1.22 ratio      EKG: No interval changes.      MEDICATIONS  (STANDING):  ALBUTerol    0.083% 10 milliGRAM(s) Nebulizer once  amLODIPine   Tablet 10 milliGRAM(s) Oral daily  atorvastatin 80 milliGRAM(s) Oral at bedtime  carvedilol 25 milliGRAM(s) Oral every 12 hours  dextrose 5%. 1000 milliLiter(s) (50 mL/Hr) IV Continuous <Continuous>  dextrose 50% Injectable 12.5 Gram(s) IV Push once  dextrose 50% Injectable 25 Gram(s) IV Push once  dextrose 50% Injectable 25 Gram(s) IV Push once  docusate sodium 100 milliGRAM(s) Oral two times a day  DULoxetine 60 milliGRAM(s) Oral daily  epoetin sara Injectable 30972 Unit(s) SubCutaneous <User Schedule>  gabapentin 100 milliGRAM(s) Oral three times a day  insulin lispro (HumaLOG) corrective regimen sliding scale   SubCutaneous three times a day before meals  isosorbide   mononitrate ER Tablet (IMDUR) 60 milliGRAM(s) Oral daily  magnesium oxide 400 milliGRAM(s) Oral daily  multiple electrolytes Injection Type 1 1000 milliLiter(s) (65 mL/Hr) IV Continuous <Continuous>  pantoprazole    Tablet 40 milliGRAM(s) Oral before breakfast    MEDICATIONS  (PRN):  acetaminophen   Tablet .. 650 milliGRAM(s) Oral every 6 hours PRN Mild Pain (1 - 3)  dextrose 40% Gel 15 Gram(s) Oral once PRN Blood Glucose LESS THAN 70 milliGRAM(s)/deciliter  glucagon  Injectable 1 milliGRAM(s) IntraMuscular once PRN Glucose LESS THAN 70 milligrams/deciliter  ondansetron Injectable 4 milliGRAM(s) IV Push every 6 hours PRN Nausea and/or Vomiting      Assessment- Rapid Response called for 63y year old Female with a past medical history of CAD s/p CABG and PCI, PAD s/p baloon angioplasty, DM2, HTN, HLD, hx of cardiac arrest, recent GI bleed last month, Diastolic CHF, legally blind, pleural effusion requiring thoracentesis in Dec 2018. admitted for Dyspnea/GI Bleed.   Pt stood up, lost strength and consciousness for a few seconds. Caught by nursing staff, without any trauma. Rapid recovery without post-ictal state.  Soft BP and mild dehydration noted on exam. Hx and physical examination appear syncopal in nature.  will administer gentle fluids, orthostatics and EKG    Case discussed with SROC and Dr. Coronado  Will follow up in 1 hour    -Barney Davidson MD;  PGY-1

## 2019-02-23 NOTE — CHART NOTE - NSCHARTNOTEFT_GEN_A_CORE
Patient seen and examined for rapid response follow up.  Patient found sitting in chair without any distress.  Repeat /65.    Continue management per primary team.    BRAEDEN PGY3

## 2019-02-23 NOTE — PROGRESS NOTE ADULT - SUBJECTIVE AND OBJECTIVE BOX
NEPHROLOGY INTERVAL HPI/OVERNIGHT EVENTS:  pt resting this AM; no acute issues  tolerated colonoscopy yesterday==>diverticuli but otherwise poor prep    MEDICATIONS  (STANDING):  ALBUTerol    0.083% 10 milliGRAM(s) Nebulizer once  amLODIPine   Tablet 10 milliGRAM(s) Oral daily  atorvastatin 80 milliGRAM(s) Oral at bedtime  carvedilol 25 milliGRAM(s) Oral every 12 hours  dextrose 5%. 1000 milliLiter(s) (50 mL/Hr) IV Continuous <Continuous>  dextrose 50% Injectable 12.5 Gram(s) IV Push once  dextrose 50% Injectable 25 Gram(s) IV Push once  dextrose 50% Injectable 25 Gram(s) IV Push once  docusate sodium 100 milliGRAM(s) Oral two times a day  DULoxetine 60 milliGRAM(s) Oral daily  epoetin sara Injectable 97401 Unit(s) SubCutaneous <User Schedule>  gabapentin 100 milliGRAM(s) Oral three times a day  insulin lispro (HumaLOG) corrective regimen sliding scale   SubCutaneous three times a day before meals  isosorbide   mononitrate ER Tablet (IMDUR) 60 milliGRAM(s) Oral daily  magnesium oxide 400 milliGRAM(s) Oral daily  pantoprazole    Tablet 40 milliGRAM(s) Oral before breakfast  torsemide 20 milliGRAM(s) Oral every 12 hours    MEDICATIONS  (PRN):  acetaminophen   Tablet .. 650 milliGRAM(s) Oral every 6 hours PRN Mild Pain (1 - 3)  dextrose 40% Gel 15 Gram(s) Oral once PRN Blood Glucose LESS THAN 70 milliGRAM(s)/deciliter  glucagon  Injectable 1 milliGRAM(s) IntraMuscular once PRN Glucose LESS THAN 70 milligrams/deciliter  ondansetron Injectable 4 milliGRAM(s) IV Push every 6 hours PRN Nausea and/or Vomiting      Allergies    Accupril (Other)          Vital Signs Last 24 Hrs  T(C): 36.6 (23 Feb 2019 07:33), Max: 36.9 (23 Feb 2019 00:29)  T(F): 97.9 (23 Feb 2019 07:33), Max: 98.5 (23 Feb 2019 00:29)  HR: 80 (23 Feb 2019 07:33) (62 - 82)  BP: 113/66 (23 Feb 2019 07:33) (113/66 - 132/68)  BP(mean): --  RR: 18 (23 Feb 2019 07:33) (18 - 18)  SpO2: 98% (23 Feb 2019 07:33) (98% - 99%)    PHYSICAL EXAM:  NAD  Cardiovascular: Normal S1 S2, No rub  Respiratory: Clear; diminished BS at bases	  Neuro: A & O x 3; non focal  Gastrointestinal:  Soft, Non-tender, + BS	  Extremities: trace LE edema    LABS:                        7.9    4.7   )-----------( 136      ( 22 Feb 2019 23:25 )             24.6     02-22    143  |  100  |  52.0<H>  ----------------------------<  268<H>  4.4   |  37.0<H>  |  1.07    Ca    7.9<L>      22 Feb 2019 23:25  Mg     2.2     02-22          PT/INR - ( 21 Feb 2019 15:09 )   PT: 14.1 sec;   INR: 1.22 ratio             Magnesium, Serum: 2.2 mg/dL (02-22 @ 23:25)      RADIOLOGY & ADDITIONAL TESTS:

## 2019-02-23 NOTE — PROGRESS NOTE ADULT - ASSESSMENT
Pt is a 64 yo presenting to ED w/ SOB 2/2 diastolic HF exacerbation and noted to have KEITH and hyperkalemia, PMH CAD s/p CABG and PCI, PAD s/p baloon angioplasty, DM2, HTN, HLD, hx of cardiac arrest, recent GI bleed last month, Diastolic CHF, legally blind, pleural effusion requiring thoracentesis in Dec 2018.     1) Lower GI Bleed  - s/p 1 unit of PRBCs, Hg 7.9 today  - Aspirin & plavix on hold   - GI Consult appreciated  - s/p Colonoscopy   ~ Poor prep throughout the colon. Diverticulosis seen. Unable to visualize colon sufficiently for poor prep  Await GI plan & decision on resuming ASA/ plavix  Advance diet as tolerated if no plans for further GI w/u    2) Hyperkalemia  - Hold Losartan    3) Acute on Chronic Diastolic Heart Failure  - Improved   - Lasix Infusion switched to Demadex 20 mg BID, c/w coreg & imdur  - Hold Losartan due to hyperkalemia   - Cardiology input appreciated   - ECHO shows EF of > 75%. Moderate to Severe TR    4) Acute on Chronic Hypercapnic Respiratory Failure  - H/O OHS/KYREE  - Continue NIV (AVAPS) Nocturnal and PRN (will need on discharge)  - ICU Consult appreciated.   - Pulmonary input appreciated  - Avoid Narcotics for pain    5) Acute on chronic kidney injury  - Improved  - Avoid nephrotoxic medications  - Renal input appreciated    6) CAD stents in 2014 / PAD / HLD / HTN  - Continue, Imdur, Lipitor, Coreg and Norvasc   - Plavix on hold since last admission due to GI Bleed and now Aspirin on hold as well (resume once cleared by GI)    7) DM 2  - HbA1c 5.6  - Accu checks and ISS  - Increase Lantus to 9 units at bedtime    8) Anemia  - Multifactorial (chronic, now complicated with acute bleeding)  - Plan as above  - s/p Venofer   - Continue Procrit    9) Transaminitis  - Likely secondary to liver congestion  - Improved    10) History of Depression  - Continue Duloxetine 60 mg    11) IV infiltration LUE  - Arm elevation  - Warm compresses   - No DVT     DVT Prophylaxis -- IPC     Dispo: [facility]. Will need NIV on discharge (CM & SW aware). GI Clearance to resume Aspirin and Plavix.

## 2019-02-23 NOTE — PROGRESS NOTE ADULT - SUBJECTIVE AND OBJECTIVE BOX
Pt resting comfortably when seen. Status post limited colonoscopy yesterday to the cecum because of sub-optimal bowel prep throughout the colon. No further hematochezia since admission with a stable Hb of 7.9 grams. She is presently on clear liquids.     MEDICATIONS:  MEDICATIONS  (STANDING):  ALBUTerol    0.083% 10 milliGRAM(s) Nebulizer once  amLODIPine   Tablet 10 milliGRAM(s) Oral daily  atorvastatin 80 milliGRAM(s) Oral at bedtime  carvedilol 25 milliGRAM(s) Oral every 12 hours  dextrose 5%. 1000 milliLiter(s) (50 mL/Hr) IV Continuous <Continuous>  dextrose 50% Injectable 12.5 Gram(s) IV Push once  dextrose 50% Injectable 25 Gram(s) IV Push once  dextrose 50% Injectable 25 Gram(s) IV Push once  docusate sodium 100 milliGRAM(s) Oral two times a day  DULoxetine 60 milliGRAM(s) Oral daily  epoetin asra Injectable 06932 Unit(s) SubCutaneous <User Schedule>  gabapentin 100 milliGRAM(s) Oral three times a day  insulin lispro (HumaLOG) corrective regimen sliding scale   SubCutaneous three times a day before meals  isosorbide   mononitrate ER Tablet (IMDUR) 60 milliGRAM(s) Oral daily  magnesium oxide 400 milliGRAM(s) Oral daily  pantoprazole    Tablet 40 milliGRAM(s) Oral before breakfast  torsemide 20 milliGRAM(s) Oral every 12 hours    MEDICATIONS  (PRN):  acetaminophen   Tablet .. 650 milliGRAM(s) Oral every 6 hours PRN Mild Pain (1 - 3)  dextrose 40% Gel 15 Gram(s) Oral once PRN Blood Glucose LESS THAN 70 milliGRAM(s)/deciliter  glucagon  Injectable 1 milliGRAM(s) IntraMuscular once PRN Glucose LESS THAN 70 milligrams/deciliter  ondansetron Injectable 4 milliGRAM(s) IV Push every 6 hours PRN Nausea and/or Vomiting      Allergies    Accupril (Other)    Intolerances        Vital Signs Last 24 Hrs  T(C): 36.6 (23 Feb 2019 07:33), Max: 36.9 (23 Feb 2019 00:29)  T(F): 97.9 (23 Feb 2019 07:33), Max: 98.5 (23 Feb 2019 00:29)  HR: 80 (23 Feb 2019 07:33) (62 - 82)  BP: 113/66 (23 Feb 2019 07:33) (113/66 - 132/68)  BP(mean): --  RR: 18 (23 Feb 2019 07:33) (18 - 18)  SpO2: 98% (23 Feb 2019 07:33) (98% - 99%)    02-22 @ 07:01  -  02-23 @ 07:00  --------------------------------------------------------  IN: 0 mL / OUT: 1600 mL / NET: -1600 mL        PHYSICAL EXAM:    General: Well developed; obese; in no acute distress  HEENT: MMM, conjunctiva pink and sclera anicteric.  Lungs: clear to auscultation bilaterally.  Cor: RRR S1, S2 only.  Gastrointestinal: Abdomen: Soft obese, non-tender non-distended; Normal bowel sounds; No hepatosplenomegaly.      LABS:  CBC Full  -  ( 23 Feb 2019 10:02 )  WBC Count : 4.0 K/uL  Hemoglobin : 7.9 g/dL  Hematocrit : 24.7 %  Platelet Count - Automated : 133 K/uL  Mean Cell Volume : 91.8 fl  Mean Cell Hemoglobin : 29.4 pg  Mean Cell Hemoglobin Concentration : 32.0 g/dL  Auto Neutrophil # : 3.0 K/uL  Auto Lymphocyte # : 0.6 K/uL  Auto Monocyte # : 0.4 K/uL  Auto Eosinophil # : 0.1 K/uL  Auto Basophil # : 0.0 K/uL  Auto Neutrophil % : 73.3 %  Auto Lymphocyte % : 15.6 %  Auto Monocyte % : 8.6 %  Auto Eosinophil % : 2.0 %  Auto Basophil % : 0.5 %    02-23    143  |  100  |  54.0<H>  ----------------------------<  212<H>  4.4   |  37.0<H>  |  1.04    Ca    7.8<L>      23 Feb 2019 10:02  Phos  2.3     02-23  Mg     2.3     02-23      PT/INR - ( 21 Feb 2019 15:09 )   PT: 14.1 sec;   INR: 1.22 ratio                           RADIOLOGY & ADDITIONAL STUDIES (The following images were personally reviewed):

## 2019-02-23 NOTE — PROGRESS NOTE ADULT - ASSESSMENT
ARF: decompensated CHF (R > L)  Prerenal component  1.1g proteinuria  - cont Torsemide  - daily weights and adjust dose accordingly==> will need to tolerate azotemia  - monitor labs    Anemia: +CRF +GIB==> colonoscopy results noted  s/p IV Fe  - cont QUENTIN  - monitor H/H; target Hgb=10.0  - PRBCs as needed

## 2019-02-23 NOTE — PROGRESS NOTE ADULT - PROBLEM SELECTOR PLAN 1
Resolved. Possibly diverticular in etiology. Will advance diet today to solids and observe for re-bleeding with re-feeding. Repeat labs ordered for the AM.

## 2019-02-23 NOTE — PROGRESS NOTE ADULT - SUBJECTIVE AND OBJECTIVE BOX
Heart failure    HPI:  Pt is a 62 yo presenting to ED w/ SOB, PMH CAD s/p CABG and PCI, PAD s/p baloon angioplasty, DM2, HTN, HLD, hx of cardiac arrest, recent GI bleed last month, Diastolic CHF, legally blind, pleural effusion requiring thoracentesis in Dec 2018.   Patient states that she has been having increasing SOB over the last week, she lives at home with her family, denies sick contacts, but has been having more exertional dyspnea. She states she walks with a walker but it is taking less activity for her dyspnea to occur. She has been compliant with all of her medications, she does not recall the names, but states that there have been no changes to her medication regimen since her discharge. She denies any associated chest pain or pressure. She has been taking her tosemide she believes. Denies PND or orthopnea, but she has noted increased LE edema.   Denies headaches, nausea, vomiting, chest pain, palpitations, abdominal pain, constipation, diarrhea, melena, hematochezia, dysuria.   Her SOB has since resolved. She is breathing comfortably, has no complaints.  In ED patient was noted to have hyperkalemia on repeat labs, troponin elevated at 0.09. Her creatinine is also elevated. She did not receive any potassium treatment initially so I ordered Calcium gluc, kayexelate, Insulin, dextrose, Albuterol. EKG without widened QRS or peaked T waves. (11 Feb 2019 03:58)    Interval History:  Patient was seen and examined at bedside. Doing well. No GIB reported overnight as per RN.    Denies chest pain, palpitations, shortness of breath, headache, dizziness, visual symptoms, nausea, vomiting or abdominal pain.    ROS:  As per interval history otherwise unremarkable.    PHYSICAL EXAM:  Vital Signs   T(C): 36.6 (22 Feb 2019 15:43), Max: 36.9 (21 Feb 2019 23:58)  T(F): 97.9 (22 Feb 2019 15:43), Max: 98.5 (21 Feb 2019 23:58)  HR: 69 (22 Feb 2019 15:43) (62 - 69)  BP: 132/68 (22 Feb 2019 15:43) (120/63 - 132/68)  RR: 18 (22 Feb 2019 15:43) (18 - 19)  SpO2: 98% (22 Feb 2019 15:43) (96% - 100%)    General: Well developed. Well nourished. No acute distress, morbidly obese  HEENT: Opaque right cornea. EOMI. Clear conjunctivae. Moist mucus membrane  Neck: Supple. No JVD.   Chest: b/l mild diffuse wheezing  Heart: Normal S1 & S2. RRR. No murmur.   Abdomen: Soft. Non-tender. Non-distended. + BS. Abdominal wall edema.   Ext: 2+ pedal edema. No calf tenderness. 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 Dry scab on right heel. Old healed scars on left leg. Swelling in LUE due to IV infiltration. Neurovascular intact.   Neuro: Active and alert. No focal deficit. No speech disorder  Skin: Warm and Dry  Psychiatry: Normal mood and affect    MEDICATIONS  (STANDING):  ALBUTerol    0.083% 10 milliGRAM(s) Nebulizer once  amLODIPine   Tablet 10 milliGRAM(s) Oral daily  atorvastatin 80 milliGRAM(s) Oral at bedtime  carvedilol 25 milliGRAM(s) Oral every 12 hours  dextrose 5%. 1000 milliLiter(s) (50 mL/Hr) IV Continuous <Continuous>  dextrose 50% Injectable 12.5 Gram(s) IV Push once  dextrose 50% Injectable 25 Gram(s) IV Push once  dextrose 50% Injectable 25 Gram(s) IV Push once  docusate sodium 100 milliGRAM(s) Oral two times a day  DULoxetine 60 milliGRAM(s) Oral daily  epoetin sara Injectable 38271 Unit(s) SubCutaneous <User Schedule>  gabapentin 100 milliGRAM(s) Oral three times a day  insulin glargine Injectable (LANTUS) 5 Unit(s) SubCutaneous at bedtime  insulin lispro (HumaLOG) corrective regimen sliding scale   SubCutaneous three times a day before meals  isosorbide   mononitrate ER Tablet (IMDUR) 60 milliGRAM(s) Oral daily  magnesium oxide 400 milliGRAM(s) Oral daily  pantoprazole    Tablet 40 milliGRAM(s) Oral before breakfast  torsemide 20 milliGRAM(s) Oral every 12 hours    MEDICATIONS  (PRN):  acetaminophen   Tablet .. 650 milliGRAM(s) Oral every 6 hours PRN Mild Pain (1 - 3)  dextrose 40% Gel 15 Gram(s) Oral once PRN Blood Glucose LESS THAN 70 milliGRAM(s)/deciliter  glucagon  Injectable 1 milliGRAM(s) IntraMuscular once PRN Glucose LESS THAN 70 milligrams/deciliter  ondansetron Injectable 4 milliGRAM(s) IV Push every 6 hours PRN Nausea and/or Vomiting    LABS:  CAPILLARY BLOOD GLUCOSE  POCT Blood Glucose.: 244 mg/dL (22 Feb 2019 17:23)  POCT Blood Glucose.: 167 mg/dL (22 Feb 2019 11:44)  POCT Blood Glucose.: 178 mg/dL (22 Feb 2019 07:57)  POCT Blood Glucose.: 239 mg/dL (21 Feb 2019 20:53)                        8.1    4.0   )-----------( 118      ( 21 Feb 2019 15:09 )             24.9     02-21    144  |  102  |  55.0<H>  ----------------------------<  121<H>  4.9   |  37.0<H>  |  1.16    Ca    8.1<L>      21 Feb 2019 15:09      PT/INR - ( 21 Feb 2019 15:09 )   PT: 14.1 sec;   INR: 1.22 ratio         RADIOLOGY & ADDITIONAL STUDIES:  Reviewed

## 2019-02-24 LAB
ANION GAP SERPL CALC-SCNC: 7 MMOL/L — SIGNIFICANT CHANGE UP (ref 5–17)
BASOPHILS # BLD AUTO: 0 K/UL — SIGNIFICANT CHANGE UP (ref 0–0.2)
BASOPHILS NFR BLD AUTO: 0.4 % — SIGNIFICANT CHANGE UP (ref 0–2)
BUN SERPL-MCNC: 63 MG/DL — HIGH (ref 8–20)
CALCIUM SERPL-MCNC: 7.9 MG/DL — LOW (ref 8.6–10.2)
CHLORIDE SERPL-SCNC: 97 MMOL/L — LOW (ref 98–107)
CO2 SERPL-SCNC: 37 MMOL/L — HIGH (ref 22–29)
CREAT SERPL-MCNC: 0.93 MG/DL — SIGNIFICANT CHANGE UP (ref 0.5–1.3)
EOSINOPHIL # BLD AUTO: 0.1 K/UL — SIGNIFICANT CHANGE UP (ref 0–0.5)
EOSINOPHIL NFR BLD AUTO: 1.7 % — SIGNIFICANT CHANGE UP (ref 0–6)
GLUCOSE BLDC GLUCOMTR-MCNC: 214 MG/DL — HIGH (ref 70–99)
GLUCOSE BLDC GLUCOMTR-MCNC: 219 MG/DL — HIGH (ref 70–99)
GLUCOSE BLDC GLUCOMTR-MCNC: 242 MG/DL — HIGH (ref 70–99)
GLUCOSE BLDC GLUCOMTR-MCNC: 254 MG/DL — HIGH (ref 70–99)
GLUCOSE BLDC GLUCOMTR-MCNC: 263 MG/DL — HIGH (ref 70–99)
GLUCOSE SERPL-MCNC: 213 MG/DL — HIGH (ref 70–115)
HCT VFR BLD CALC: 23.9 % — LOW (ref 37–47)
HGB BLD-MCNC: 7.8 G/DL — LOW (ref 12–16)
LYMPHOCYTES # BLD AUTO: 0.6 K/UL — LOW (ref 1–4.8)
LYMPHOCYTES # BLD AUTO: 12.6 % — LOW (ref 20–55)
MAGNESIUM SERPL-MCNC: 2.2 MG/DL — SIGNIFICANT CHANGE UP (ref 1.6–2.6)
MCHC RBC-ENTMCNC: 29.8 PG — SIGNIFICANT CHANGE UP (ref 27–31)
MCHC RBC-ENTMCNC: 32.6 G/DL — SIGNIFICANT CHANGE UP (ref 32–36)
MCV RBC AUTO: 91.2 FL — SIGNIFICANT CHANGE UP (ref 81–99)
MONOCYTES # BLD AUTO: 0.2 K/UL — SIGNIFICANT CHANGE UP (ref 0–0.8)
MONOCYTES NFR BLD AUTO: 5.1 % — SIGNIFICANT CHANGE UP (ref 3–10)
NEUTROPHILS # BLD AUTO: 3.8 K/UL — SIGNIFICANT CHANGE UP (ref 1.8–8)
NEUTROPHILS NFR BLD AUTO: 80 % — HIGH (ref 37–73)
NT-PROBNP SERPL-SCNC: 7475 PG/ML — HIGH (ref 0–300)
PHOSPHATE SERPL-MCNC: 2.3 MG/DL — LOW (ref 2.4–4.7)
PLATELET # BLD AUTO: 137 K/UL — LOW (ref 150–400)
POTASSIUM SERPL-MCNC: 4.1 MMOL/L — SIGNIFICANT CHANGE UP (ref 3.5–5.3)
POTASSIUM SERPL-SCNC: 4.1 MMOL/L — SIGNIFICANT CHANGE UP (ref 3.5–5.3)
RBC # BLD: 2.62 M/UL — LOW (ref 4.4–5.2)
RBC # FLD: 16.6 % — HIGH (ref 11–15.6)
SODIUM SERPL-SCNC: 141 MMOL/L — SIGNIFICANT CHANGE UP (ref 135–145)
WBC # BLD: 4.7 K/UL — LOW (ref 4.8–10.8)
WBC # FLD AUTO: 4.7 K/UL — LOW (ref 4.8–10.8)

## 2019-02-24 PROCEDURE — 71045 X-RAY EXAM CHEST 1 VIEW: CPT | Mod: 26

## 2019-02-24 PROCEDURE — 93970 EXTREMITY STUDY: CPT | Mod: 26

## 2019-02-24 PROCEDURE — 99232 SBSQ HOSP IP/OBS MODERATE 35: CPT

## 2019-02-24 RX ORDER — FUROSEMIDE 40 MG
40 TABLET ORAL EVERY 12 HOURS
Qty: 0 | Refills: 0 | Status: DISCONTINUED | OUTPATIENT
Start: 2019-02-24 | End: 2019-02-26

## 2019-02-24 RX ORDER — INSULIN GLARGINE 100 [IU]/ML
10 INJECTION, SOLUTION SUBCUTANEOUS AT BEDTIME
Qty: 0 | Refills: 0 | Status: DISCONTINUED | OUTPATIENT
Start: 2019-02-24 | End: 2019-03-14

## 2019-02-24 RX ORDER — IPRATROPIUM/ALBUTEROL SULFATE 18-103MCG
3 AEROSOL WITH ADAPTER (GRAM) INHALATION EVERY 6 HOURS
Qty: 0 | Refills: 0 | Status: DISCONTINUED | OUTPATIENT
Start: 2019-02-24 | End: 2019-03-16

## 2019-02-24 RX ORDER — ASPIRIN/CALCIUM CARB/MAGNESIUM 324 MG
81 TABLET ORAL DAILY
Qty: 0 | Refills: 0 | Status: DISCONTINUED | OUTPATIENT
Start: 2019-02-24 | End: 2019-03-12

## 2019-02-24 RX ADMIN — Medication 3 MILLILITER(S): at 16:27

## 2019-02-24 RX ADMIN — CARVEDILOL PHOSPHATE 25 MILLIGRAM(S): 80 CAPSULE, EXTENDED RELEASE ORAL at 05:03

## 2019-02-24 RX ADMIN — INSULIN GLARGINE 10 UNIT(S): 100 INJECTION, SOLUTION SUBCUTANEOUS at 21:54

## 2019-02-24 RX ADMIN — ISOSORBIDE MONONITRATE 60 MILLIGRAM(S): 60 TABLET, EXTENDED RELEASE ORAL at 12:19

## 2019-02-24 RX ADMIN — Medication 2: at 11:55

## 2019-02-24 RX ADMIN — Medication 100 MILLIGRAM(S): at 05:03

## 2019-02-24 RX ADMIN — DULOXETINE HYDROCHLORIDE 60 MILLIGRAM(S): 30 CAPSULE, DELAYED RELEASE ORAL at 12:19

## 2019-02-24 RX ADMIN — MAGNESIUM OXIDE 400 MG ORAL TABLET 400 MILLIGRAM(S): 241.3 TABLET ORAL at 12:19

## 2019-02-24 RX ADMIN — GABAPENTIN 100 MILLIGRAM(S): 400 CAPSULE ORAL at 12:19

## 2019-02-24 RX ADMIN — ATORVASTATIN CALCIUM 80 MILLIGRAM(S): 80 TABLET, FILM COATED ORAL at 21:32

## 2019-02-24 RX ADMIN — PANTOPRAZOLE SODIUM 40 MILLIGRAM(S): 20 TABLET, DELAYED RELEASE ORAL at 05:03

## 2019-02-24 RX ADMIN — Medication 100 MILLIGRAM(S): at 17:10

## 2019-02-24 RX ADMIN — Medication 3 MILLILITER(S): at 21:10

## 2019-02-24 RX ADMIN — Medication 2: at 16:32

## 2019-02-24 RX ADMIN — GABAPENTIN 100 MILLIGRAM(S): 400 CAPSULE ORAL at 21:32

## 2019-02-24 RX ADMIN — Medication 20 MILLIGRAM(S): at 17:10

## 2019-02-24 RX ADMIN — GABAPENTIN 100 MILLIGRAM(S): 400 CAPSULE ORAL at 05:03

## 2019-02-24 RX ADMIN — AMLODIPINE BESYLATE 10 MILLIGRAM(S): 2.5 TABLET ORAL at 05:03

## 2019-02-24 RX ADMIN — Medication 81 MILLIGRAM(S): at 12:20

## 2019-02-24 RX ADMIN — Medication 40 MILLIGRAM(S): at 17:10

## 2019-02-24 RX ADMIN — Medication 40 MILLIGRAM(S): at 18:10

## 2019-02-24 RX ADMIN — CARVEDILOL PHOSPHATE 25 MILLIGRAM(S): 80 CAPSULE, EXTENDED RELEASE ORAL at 17:10

## 2019-02-24 RX ADMIN — Medication 2: at 07:45

## 2019-02-24 RX ADMIN — Medication 40 MILLIGRAM(S): at 12:16

## 2019-02-24 NOTE — PROGRESS NOTE ADULT - ASSESSMENT
Pt is a 62 yo presenting to ED w/ SOB 2/2 diastolic HF exacerbation and noted to have KEITH and hyperkalemia, PMH CAD s/p CABG and PCI, PAD s/p baloon angioplasty, DM2, HTN, HLD, hx of cardiac arrest, recent GI bleed last month, Diastolic CHF, legally blind, pleural effusion requiring thoracentesis in Dec 2018.     1) Lower GI Bleed  - s/p 1 unit of PRBCs, Hg 7.8 today  - GI Consult appreciated  - s/p Colonoscopy   ~ Poor prep throughout the colon. Diverticulosis seen. Unable to visualize colon sufficiently for poor prep  No further GI w/u recommended, ASA resumed as per GI, plavix yet on hold  Advance diet as tolerated if no plans for further GI w/u    2) Hyperkalemia  - Hold Losartan    3) Acute on Chronic Diastolic Heart Failure  - Improved   - Lasix Infusion switched to Demadex 20 mg BID, c/w coreg & imdur. Pt was presyncopal yesterday so was given gentle IVF x 1 L but now better so resumed torsemide & d/em IVF. Await orthostatic vitals. f/u CXR  - Hold Losartan due to hyperkalemia   - Cardiology input appreciated   - ECHO shows EF of > 75%. Moderate to Severe TR    4) Acute on Chronic Hypercapnic Respiratory Failure  - H/O OHS/KYREE  - Continue NIV (AVAPS) Nocturnal and PRN (will need on discharge). Eventual SINGH with NIV   Pulm recommend chronic NIV for chronic hypercarbic respiratory failure to improve symptoms and to decrease hospitalizations; BiPAP was considered and tried without resolution of hypercarbia  - ICU Consult appreciated.   - Pulmonary input appreciated  - Avoid Narcotics for pain  Low dose steroids added for tight airways, taper off quickly    5) Acute on chronic kidney injury  - Improved  - Avoid nephrotoxic medications  - Renal input appreciated    6) CAD stents in 2014 / PAD / HLD / HTN  - Continue, Imdur, Lipitor, Coreg and Norvasc   - Plavix on hold since last admission due to GI Bleed. ASA resumed as per GI, plavix yet on hold    7) DM 2  - HbA1c 5.6  - Accu checks and ISS  - Increase Lantus to 10 units at bedtime    8) Anemia  - Multifactorial (chronic, now complicated with acute bleeding)  - Plan as above  - s/p Venofer   - Continue Procrit    9) Transaminitis  - Likely secondary to liver congestion  - Improved    10) History of Depression  - Continue Duloxetine 60 mg    11) IV infiltration LUE  - Arm elevation  - Warm compresses   - No DVT     DVT Prophylaxis -- IPC     Dispo: SINGH. Will need NIV on discharge (CM & SW aware).

## 2019-02-24 NOTE — CHART NOTE - NSCHARTNOTEFT_GEN_A_CORE
Source: Patient [X]  Family [X]   other [X] EMR reviewed.    Current Diet: Cho Cons, Renal, DASH/TLC, High Fiber.    PO intake:  < 50% [ ]   50-75%  [ ]   %  [X]  other :    Source for PO intake [X] Patient [ ] family [ ] chart [ ] staff [ ] other    Current Weight:   (2/19) 245 lbs  (2/16) 210 lbs    Pertinent Medications: MEDICATIONS  (STANDING):  ALBUTerol    0.083% 10 milliGRAM(s) Nebulizer once  ALBUTerol/ipratropium for Nebulization 3 milliLiter(s) Nebulizer every 6 hours  amLODIPine   Tablet 10 milliGRAM(s) Oral daily  aspirin enteric coated 81 milliGRAM(s) Oral daily  atorvastatin 80 milliGRAM(s) Oral at bedtime  carvedilol 25 milliGRAM(s) Oral every 12 hours  dextrose 5%. 1000 milliLiter(s) (50 mL/Hr) IV Continuous <Continuous>  dextrose 50% Injectable 12.5 Gram(s) IV Push once  dextrose 50% Injectable 25 Gram(s) IV Push once  dextrose 50% Injectable 25 Gram(s) IV Push once  docusate sodium 100 milliGRAM(s) Oral two times a day  DULoxetine 60 milliGRAM(s) Oral daily  epoetin sara Injectable 18414 Unit(s) SubCutaneous <User Schedule>  gabapentin 100 milliGRAM(s) Oral three times a day  insulin glargine Injectable (LANTUS) 10 Unit(s) SubCutaneous at bedtime  insulin lispro (HumaLOG) corrective regimen sliding scale   SubCutaneous three times a day before meals  isosorbide   mononitrate ER Tablet (IMDUR) 60 milliGRAM(s) Oral daily  magnesium oxide 400 milliGRAM(s) Oral daily  methylPREDNISolone sodium succinate Injectable 40 milliGRAM(s) IV Push every 6 hours  multiple electrolytes Injection Type 1 1000 milliLiter(s) (65 mL/Hr) IV Continuous <Continuous>  pantoprazole    Tablet 40 milliGRAM(s) Oral before breakfast  torsemide 20 milliGRAM(s) Oral two times a day    MEDICATIONS  (PRN):  acetaminophen   Tablet .. 650 milliGRAM(s) Oral every 6 hours PRN Mild Pain (1 - 3)  dextrose 40% Gel 15 Gram(s) Oral once PRN Blood Glucose LESS THAN 70 milliGRAM(s)/deciliter  glucagon  Injectable 1 milliGRAM(s) IntraMuscular once PRN Glucose LESS THAN 70 milligrams/deciliter  ondansetron Injectable 4 milliGRAM(s) IV Push every 6 hours PRN Nausea and/or Vomiting    Pertinent Labs: CBC Full  -  (24 Feb 2019 09:20 )  WBC Count : 4.7 K/uL  Hemoglobin : 7.8 g/dL  Hematocrit : 23.9 %  POCT glucose 219, phos 2.3, BUN 63, glucose 213, Ca 7.9    Skin: Right heel diabetic ulcer.  Abdominal wall and +2 pedal extremity edema noted.    Nutrition focused physical exam NOT conducted - found signs of malnutrition [ ]absent [ ]present    Subcutaneous fat loss: [ ] Orbital fat pads region, [ ]Buccal fat region, [ ]Triceps region,  [ ]Ribs region    Muscle wasting: [ ]Temples region, [ ]Clavicle region, [ ]Shoulder region, [ ]Scapula region, [ ]Interosseous region,  [ ]thigh region, [ ]Calf region    Current Nutrition Diagnosis: Pt at nutritional risk secondary to moderate (acute) protein-calorie malnutrition related to inadequate protein-energy intake in the setting of poor appetite and medical intervention requiring intermittent NPO/CLD as evidenced by edema and pt  meeting <75% of protein-energy needs >7 days. Pt now reports improved po intake completing % of meals and tolerating diet well. Food/menu preferences obtained from pt.    Recommendations: Continue current nutrition plan of care.    Monitoring and Evaluation:   [X ] PO intake [X ] Tolerance to diet prescription [X] Daily Weight  [X] Follow up per protocol [X] Labs: glucose and renal labs.

## 2019-02-24 NOTE — PROGRESS NOTE ADULT - PROBLEM SELECTOR PLAN 1
Suboptimal colonoscopy Friday because of suboptimal prep. Rectal bleeding may be diverticular in etiology and appears to have stopped. Would observe pt. in hospital one more day and if no recurrent hematochezia today consider D/C home tomorrow. Would hold Plavix indefinitely if OK with Cardiology but ASA is OK.

## 2019-02-24 NOTE — PROGRESS NOTE ADULT - SUBJECTIVE AND OBJECTIVE BOX
Pt seen and examined. Condition stable w/o recurrent hematochezia with diet advancement to solids yesterday.    REVIEW OF SYSTEMS:  Constitutional: No fever, weight loss or fatigue  Cardiovascular: No chest pain, palpitations, dizziness or leg swelling  Gastrointestinal: As noted above.  Skin: No itching, burning, rashes or lesions       MEDICATIONS:  MEDICATIONS  (STANDING):  ALBUTerol    0.083% 10 milliGRAM(s) Nebulizer once  ALBUTerol/ipratropium for Nebulization 3 milliLiter(s) Nebulizer every 6 hours  amLODIPine   Tablet 10 milliGRAM(s) Oral daily  aspirin enteric coated 81 milliGRAM(s) Oral daily  atorvastatin 80 milliGRAM(s) Oral at bedtime  carvedilol 25 milliGRAM(s) Oral every 12 hours  dextrose 5%. 1000 milliLiter(s) (50 mL/Hr) IV Continuous <Continuous>  dextrose 50% Injectable 12.5 Gram(s) IV Push once  dextrose 50% Injectable 25 Gram(s) IV Push once  dextrose 50% Injectable 25 Gram(s) IV Push once  docusate sodium 100 milliGRAM(s) Oral two times a day  DULoxetine 60 milliGRAM(s) Oral daily  epoetin sara Injectable 79654 Unit(s) SubCutaneous <User Schedule>  gabapentin 100 milliGRAM(s) Oral three times a day  insulin glargine Injectable (LANTUS) 10 Unit(s) SubCutaneous at bedtime  insulin lispro (HumaLOG) corrective regimen sliding scale   SubCutaneous three times a day before meals  isosorbide   mononitrate ER Tablet (IMDUR) 60 milliGRAM(s) Oral daily  magnesium oxide 400 milliGRAM(s) Oral daily  methylPREDNISolone sodium succinate Injectable 40 milliGRAM(s) IV Push every 6 hours  multiple electrolytes Injection Type 1 1000 milliLiter(s) (65 mL/Hr) IV Continuous <Continuous>  pantoprazole    Tablet 40 milliGRAM(s) Oral before breakfast  torsemide 20 milliGRAM(s) Oral two times a day    MEDICATIONS  (PRN):  acetaminophen   Tablet .. 650 milliGRAM(s) Oral every 6 hours PRN Mild Pain (1 - 3)  dextrose 40% Gel 15 Gram(s) Oral once PRN Blood Glucose LESS THAN 70 milliGRAM(s)/deciliter  glucagon  Injectable 1 milliGRAM(s) IntraMuscular once PRN Glucose LESS THAN 70 milligrams/deciliter  ondansetron Injectable 4 milliGRAM(s) IV Push every 6 hours PRN Nausea and/or Vomiting      Allergies    Accupril (Other)    Intolerances        Vital Signs Last 24 Hrs  T(C): 36.4 (24 Feb 2019 09:10), Max: 36.8 (23 Feb 2019 21:14)  T(F): 97.5 (24 Feb 2019 09:10), Max: 98.2 (23 Feb 2019 21:14)  HR: 64 (24 Feb 2019 09:15) (61 - 66)  BP: 116/61 (24 Feb 2019 09:15) (95/57 - 139/63)  BP(mean): --  RR: 18 (23 Feb 2019 21:14) (18 - 18)  SpO2: 93% (24 Feb 2019 09:10) (93% - 99%)    02-23 @ 07:01  -  02-24 @ 07:00  --------------------------------------------------------  IN: 0 mL / OUT: 300 mL / NET: -300 mL        PHYSICAL EXAM:    General: Well developed; morbidly obese; in no acute distress  HEENT: MMM, conjunctiva pink and sclera anicteric.  Lungs: clear to auscultation bilaterally.  Cor: RRR S1, S2 only.  Gastrointestinal: Abdomen: Soft, obese, non-tender non-distended; Normal bowel sounds; No hepatosplenomegaly.  Extremities: no cyanosis, clubbing or edema.  Skin: Warm and dry. No obvious rash  Neuro: Pt. a + o x 3.    LABS:  CBC Full  -  ( 24 Feb 2019 09:20 )  WBC Count : 4.7 K/uL  Hemoglobin : 7.8 g/dL  Hematocrit : 23.9 %  Platelet Count - Automated : 137 K/uL  Mean Cell Volume : 91.2 fl  Mean Cell Hemoglobin : 29.8 pg  Mean Cell Hemoglobin Concentration : 32.6 g/dL  Auto Neutrophil # : 3.8 K/uL  Auto Lymphocyte # : 0.6 K/uL  Auto Monocyte # : 0.2 K/uL  Auto Eosinophil # : 0.1 K/uL  Auto Basophil # : 0.0 K/uL  Auto Neutrophil % : 80.0 %  Auto Lymphocyte % : 12.6 %  Auto Monocyte % : 5.1 %  Auto Eosinophil % : 1.7 %  Auto Basophil % : 0.4 %    02-24    141  |  97<L>  |  63.0<H>  ----------------------------<  213<H>  4.1   |  37.0<H>  |  0.93    Ca    7.9<L>      24 Feb 2019 09:20  Phos  2.3     02-24  Mg     2.2     02-24                        RADIOLOGY & ADDITIONAL STUDIES (The following images were personally reviewed):

## 2019-02-24 NOTE — PROGRESS NOTE ADULT - SUBJECTIVE AND OBJECTIVE BOX
CHIEF COMPLAINT/INTERVAL HISTORY:    Patient is a 63y old  Female who presents with a chief complaint of SOB (23 Feb 2019 12:54)      HPI:  Pt is a 62 yo presenting to ED w/ SOB, PMH CAD s/p CABG and PCI, PAD s/p baloon angioplasty, DM2, HTN, HLD, hx of cardiac arrest, recent GI bleed last month, Diastolic CHF, legally blind, pleural effusion requiring thoracentesis in Dec 2018.   Patient states that she has been having increasing SOB over the last week, she lives at home with her family, denies sick contacts, but has been having more exertional dyspnea. She states she walks with a walker but it is taking less activity for her dyspnea to occur. She has been compliant with all of her medications, she does not recall the names, but states that there have been no changes to her medication regimen since her discharge. She denies any associated chest pain or pressure. She has been taking her tosemide she believes. Denies PND or orthopnea, but she has noted increased LE edema.   Denies headaches, nausea, vomiting, chest pain, palpitations, abdominal pain, constipation, diarrhea, melena, hematochezia, dysuria.   Her SOB has since resolved. She is breathing comfortably, has no complaints.  In ED patient was noted to have hyperkalemia on repeat labs, troponin elevated at 0.09. Her creatinine is also elevated. She did not receive any potassium treatment initially so I ordered Calcium gluc, kayexelate, Insulin, dextrose, Albuterol. EKG without widened QRS or peaked T waves. (11 Feb 2019 03:58)      SUBJECTIVE & OBJECTIVE/ ROS: Pt seen and examined at bedside. Doing well. No GIB reported overnight as per RN. No further presyncopal episodes. SOB improved as per pt.   No chest pain, palpitations,  light headedness/dizziness, fevers/chills, abdominal pain, n/v, diarrhea/constipation, dysuria or increased urinary frequency.     ICU Vital Signs Last 24 Hrs  T(C): 36.4 (24 Feb 2019 09:10), Max: 36.8 (23 Feb 2019 21:14)  T(F): 97.5 (24 Feb 2019 09:10), Max: 98.2 (23 Feb 2019 21:14)  HR: 64 (24 Feb 2019 09:15) (61 - 66)  BP: 116/61 (24 Feb 2019 09:15) (95/57 - 139/63)  BP(mean): --  ABP: --  ABP(mean): --  RR: 18 (23 Feb 2019 21:14) (18 - 18)  SpO2: 93% (24 Feb 2019 09:10) (93% - 99%)        MEDICATIONS  (STANDING):  ALBUTerol    0.083% 10 milliGRAM(s) Nebulizer once  amLODIPine   Tablet 10 milliGRAM(s) Oral daily  aspirin enteric coated 81 milliGRAM(s) Oral daily  atorvastatin 80 milliGRAM(s) Oral at bedtime  carvedilol 25 milliGRAM(s) Oral every 12 hours  dextrose 5%. 1000 milliLiter(s) (50 mL/Hr) IV Continuous <Continuous>  dextrose 50% Injectable 12.5 Gram(s) IV Push once  dextrose 50% Injectable 25 Gram(s) IV Push once  dextrose 50% Injectable 25 Gram(s) IV Push once  docusate sodium 100 milliGRAM(s) Oral two times a day  DULoxetine 60 milliGRAM(s) Oral daily  epoetin sara Injectable 24624 Unit(s) SubCutaneous <User Schedule>  gabapentin 100 milliGRAM(s) Oral three times a day  insulin lispro (HumaLOG) corrective regimen sliding scale   SubCutaneous three times a day before meals  isosorbide   mononitrate ER Tablet (IMDUR) 60 milliGRAM(s) Oral daily  magnesium oxide 400 milliGRAM(s) Oral daily  multiple electrolytes Injection Type 1 1000 milliLiter(s) (65 mL/Hr) IV Continuous <Continuous>  pantoprazole    Tablet 40 milliGRAM(s) Oral before breakfast    MEDICATIONS  (PRN):  acetaminophen   Tablet .. 650 milliGRAM(s) Oral every 6 hours PRN Mild Pain (1 - 3)  dextrose 40% Gel 15 Gram(s) Oral once PRN Blood Glucose LESS THAN 70 milliGRAM(s)/deciliter  glucagon  Injectable 1 milliGRAM(s) IntraMuscular once PRN Glucose LESS THAN 70 milligrams/deciliter  ondansetron Injectable 4 milliGRAM(s) IV Push every 6 hours PRN Nausea and/or Vomiting      LABS:                        7.8    4.7   )-----------( 137      ( 24 Feb 2019 09:20 )             23.9     02-24    141  |  97<L>  |  63.0<H>  ----------------------------<  213<H>  4.1   |  37.0<H>  |  0.93    Ca    7.9<L>      24 Feb 2019 09:20  Phos  2.3     02-24  Mg     2.2     02-24            CAPILLARY BLOOD GLUCOSE      POCT Blood Glucose.: 214 mg/dL (24 Feb 2019 07:24)  POCT Blood Glucose.: 315 mg/dL (23 Feb 2019 21:42)  POCT Blood Glucose.: 276 mg/dL (23 Feb 2019 16:47)  POCT Blood Glucose.: 387 mg/dL (23 Feb 2019 13:27)      RECENT CULTURES:      RADIOLOGY & ADDITIONAL TESTS:      PHYSICAL EXAM:    General: Well developed. Well nourished. No acute distress, morbidly obese  HEENT: Opaque right cornea. EOMI. Clear conjunctivae. Moist mucus membrane  Neck: Supple. No JVD.   Chest: b/l mild diffuse wheezing  Heart: Normal S1 & S2. RRR. No murmur.   Abdomen: Soft. Non-tender. Non-distended. + BS. Abdominal wall edema.   Ext: 2+ pedal edema. No calf tenderness. 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 Dry scab on right heel. Old healed scars on left leg. Swelling in LUE due to IV infiltration. Neurovascular intact.   Neuro: Active and alert. No focal deficit. No speech disorder  Skin: Warm and Dry  Psychiatry: Normal mood and affect

## 2019-02-25 LAB
ANION GAP SERPL CALC-SCNC: 6 MMOL/L — SIGNIFICANT CHANGE UP (ref 5–17)
BUN SERPL-MCNC: 68 MG/DL — HIGH (ref 8–20)
CALCIUM SERPL-MCNC: 8.3 MG/DL — LOW (ref 8.6–10.2)
CHLORIDE SERPL-SCNC: 96 MMOL/L — LOW (ref 98–107)
CO2 SERPL-SCNC: 36 MMOL/L — HIGH (ref 22–29)
CREAT SERPL-MCNC: 1.14 MG/DL — SIGNIFICANT CHANGE UP (ref 0.5–1.3)
EOSINOPHIL # BLD AUTO: 0 K/UL — SIGNIFICANT CHANGE UP (ref 0–0.5)
EOSINOPHIL NFR BLD AUTO: 0 % — SIGNIFICANT CHANGE UP (ref 0–6)
GLUCOSE BLDC GLUCOMTR-MCNC: 244 MG/DL — HIGH (ref 70–99)
GLUCOSE BLDC GLUCOMTR-MCNC: 305 MG/DL — HIGH (ref 70–99)
GLUCOSE BLDC GLUCOMTR-MCNC: 343 MG/DL — HIGH (ref 70–99)
GLUCOSE BLDC GLUCOMTR-MCNC: 389 MG/DL — HIGH (ref 70–99)
GLUCOSE SERPL-MCNC: 259 MG/DL — HIGH (ref 70–115)
HCT VFR BLD CALC: 24.7 % — LOW (ref 37–47)
HGB BLD-MCNC: 8.2 G/DL — LOW (ref 12–16)
LYMPHOCYTES # BLD AUTO: 0.5 K/UL — LOW (ref 1–4.8)
LYMPHOCYTES # BLD AUTO: 9.6 % — LOW (ref 20–55)
MAGNESIUM SERPL-MCNC: 2.1 MG/DL — SIGNIFICANT CHANGE UP (ref 1.6–2.6)
MCHC RBC-ENTMCNC: 29.8 PG — SIGNIFICANT CHANGE UP (ref 27–31)
MCHC RBC-ENTMCNC: 33.2 G/DL — SIGNIFICANT CHANGE UP (ref 32–36)
MCV RBC AUTO: 89.8 FL — SIGNIFICANT CHANGE UP (ref 81–99)
MONOCYTES # BLD AUTO: 0.2 K/UL — SIGNIFICANT CHANGE UP (ref 0–0.8)
MONOCYTES NFR BLD AUTO: 3.2 % — SIGNIFICANT CHANGE UP (ref 3–10)
NEUTROPHILS # BLD AUTO: 4.6 K/UL — SIGNIFICANT CHANGE UP (ref 1.8–8)
NEUTROPHILS NFR BLD AUTO: 87 % — HIGH (ref 37–73)
PHOSPHATE SERPL-MCNC: 2.7 MG/DL — SIGNIFICANT CHANGE UP (ref 2.4–4.7)
PLATELET # BLD AUTO: 153 K/UL — SIGNIFICANT CHANGE UP (ref 150–400)
POTASSIUM SERPL-MCNC: 4.6 MMOL/L — SIGNIFICANT CHANGE UP (ref 3.5–5.3)
POTASSIUM SERPL-SCNC: 4.6 MMOL/L — SIGNIFICANT CHANGE UP (ref 3.5–5.3)
RBC # BLD: 2.75 M/UL — LOW (ref 4.4–5.2)
RBC # FLD: 16.2 % — HIGH (ref 11–15.6)
SODIUM SERPL-SCNC: 138 MMOL/L — SIGNIFICANT CHANGE UP (ref 135–145)
WBC # BLD: 5.3 K/UL — SIGNIFICANT CHANGE UP (ref 4.8–10.8)
WBC # FLD AUTO: 5.3 K/UL — SIGNIFICANT CHANGE UP (ref 4.8–10.8)

## 2019-02-25 PROCEDURE — 99232 SBSQ HOSP IP/OBS MODERATE 35: CPT

## 2019-02-25 PROCEDURE — 99233 SBSQ HOSP IP/OBS HIGH 50: CPT

## 2019-02-25 RX ORDER — INSULIN LISPRO 100/ML
4 VIAL (ML) SUBCUTANEOUS ONCE
Qty: 0 | Refills: 0 | Status: COMPLETED | OUTPATIENT
Start: 2019-02-25 | End: 2019-02-25

## 2019-02-25 RX ADMIN — Medication 100 MILLIGRAM(S): at 17:55

## 2019-02-25 RX ADMIN — Medication 81 MILLIGRAM(S): at 11:35

## 2019-02-25 RX ADMIN — CARVEDILOL PHOSPHATE 25 MILLIGRAM(S): 80 CAPSULE, EXTENDED RELEASE ORAL at 07:03

## 2019-02-25 RX ADMIN — GABAPENTIN 100 MILLIGRAM(S): 400 CAPSULE ORAL at 07:03

## 2019-02-25 RX ADMIN — Medication 4 UNIT(S): at 21:12

## 2019-02-25 RX ADMIN — Medication 4: at 17:08

## 2019-02-25 RX ADMIN — AMLODIPINE BESYLATE 10 MILLIGRAM(S): 2.5 TABLET ORAL at 07:03

## 2019-02-25 RX ADMIN — CARVEDILOL PHOSPHATE 25 MILLIGRAM(S): 80 CAPSULE, EXTENDED RELEASE ORAL at 17:55

## 2019-02-25 RX ADMIN — ATORVASTATIN CALCIUM 80 MILLIGRAM(S): 80 TABLET, FILM COATED ORAL at 21:12

## 2019-02-25 RX ADMIN — MAGNESIUM OXIDE 400 MG ORAL TABLET 400 MILLIGRAM(S): 241.3 TABLET ORAL at 11:36

## 2019-02-25 RX ADMIN — Medication 40 MILLIGRAM(S): at 07:03

## 2019-02-25 RX ADMIN — Medication 3 MILLILITER(S): at 03:57

## 2019-02-25 RX ADMIN — Medication 4: at 11:31

## 2019-02-25 RX ADMIN — Medication 40 MILLIGRAM(S): at 17:56

## 2019-02-25 RX ADMIN — Medication 40 MILLIGRAM(S): at 11:36

## 2019-02-25 RX ADMIN — INSULIN GLARGINE 10 UNIT(S): 100 INJECTION, SOLUTION SUBCUTANEOUS at 21:11

## 2019-02-25 RX ADMIN — Medication 100 MILLIGRAM(S): at 07:03

## 2019-02-25 RX ADMIN — Medication 3 MILLILITER(S): at 15:14

## 2019-02-25 RX ADMIN — PANTOPRAZOLE SODIUM 40 MILLIGRAM(S): 20 TABLET, DELAYED RELEASE ORAL at 07:03

## 2019-02-25 RX ADMIN — GABAPENTIN 100 MILLIGRAM(S): 400 CAPSULE ORAL at 21:12

## 2019-02-25 RX ADMIN — Medication 40 MILLIGRAM(S): at 17:55

## 2019-02-25 RX ADMIN — DULOXETINE HYDROCHLORIDE 60 MILLIGRAM(S): 30 CAPSULE, DELAYED RELEASE ORAL at 11:35

## 2019-02-25 RX ADMIN — Medication 40 MILLIGRAM(S): at 00:35

## 2019-02-25 RX ADMIN — Medication 2: at 07:31

## 2019-02-25 RX ADMIN — GABAPENTIN 100 MILLIGRAM(S): 400 CAPSULE ORAL at 11:35

## 2019-02-25 RX ADMIN — Medication 40 MILLIGRAM(S): at 07:02

## 2019-02-25 RX ADMIN — ISOSORBIDE MONONITRATE 60 MILLIGRAM(S): 60 TABLET, EXTENDED RELEASE ORAL at 11:36

## 2019-02-25 RX ADMIN — Medication 3 MILLILITER(S): at 22:47

## 2019-02-25 NOTE — PROGRESS NOTE ADULT - PROBLEM SELECTOR PLAN 1
- resolved. Pt with poor prep on colonoscopy, + diverticulosis  - check hemoglobin today  - if remains stable, tolerating diet, Will sign off tomorrow

## 2019-02-25 NOTE — PROGRESS NOTE ADULT - SUBJECTIVE AND OBJECTIVE BOX
[place] [facility]                                                        Office: [address] [address]                                                       Telephone: [phone]. Fax: [phone]      CC: Shortness of breath    INTERVAL HISTORY: improved shortness of breath.     MEDICATIONS  (STANDING):  ALBUTerol    0.083% 10 milliGRAM(s) Nebulizer once  ALBUTerol/ipratropium for Nebulization 3 milliLiter(s) Nebulizer every 6 hours  amLODIPine   Tablet 10 milliGRAM(s) Oral daily  aspirin enteric coated 81 milliGRAM(s) Oral daily  atorvastatin 80 milliGRAM(s) Oral at bedtime  carvedilol 25 milliGRAM(s) Oral every 12 hours  dextrose 5%. 1000 milliLiter(s) (50 mL/Hr) IV Continuous <Continuous>  dextrose 50% Injectable 12.5 Gram(s) IV Push once  dextrose 50% Injectable 25 Gram(s) IV Push once  dextrose 50% Injectable 25 Gram(s) IV Push once  docusate sodium 100 milliGRAM(s) Oral two times a day  DULoxetine 60 milliGRAM(s) Oral daily  epoetin sara Injectable 00375 Unit(s) SubCutaneous <User Schedule>  furosemide   Injectable 40 milliGRAM(s) IV Push every 12 hours  gabapentin 100 milliGRAM(s) Oral three times a day  insulin glargine Injectable (LANTUS) 10 Unit(s) SubCutaneous at bedtime  insulin lispro (HumaLOG) corrective regimen sliding scale   SubCutaneous three times a day before meals  isosorbide   mononitrate ER Tablet (IMDUR) 60 milliGRAM(s) Oral daily  magnesium oxide 400 milliGRAM(s) Oral daily  methylPREDNISolone sodium succinate Injectable 40 milliGRAM(s) IV Push every 12 hours  pantoprazole    Tablet 40 milliGRAM(s) Oral before breakfast    ROS: All others negative     PHYSICAL EXAM:  T(C): 37.2 (02-25-19 @ 07:10), Max: 37.2 (02-25-19 @ 07:10)  HR: 63 (02-25-19 @ 15:16) (63 - 66)  BP: 151/71 (02-25-19 @ 07:10) (117/62 - 151/71)  RR: --  SpO2: 100% (02-25-19 @ 15:16) (97% - 100%)  Wt(kg): --  I&O's Summary    24 Feb 2019 07:01  -  25 Feb 2019 07:00  --------------------------------------------------------  IN: 0 mL / OUT: 750 mL / NET: -750 mL      Appearance: Normal	  HEENT:   Normal oral mucosa, PERRL, EOMI	  Lymphatic: No lymphadenopathy  Cardiovascular: Normal S1 S2, No JVD, No murmurs. + edema.  Respiratory: Lungs clear to auscultation	  Psychiatry: not alert.   Gastrointestinal:  Soft, Non-tender, + BS	  Skin: No rashes, No ecchymoses, No cyanosis  Neurologic: Non-focal  Extremities: Normal range of motion, No clubbing, cyanosis or edema  Vascular: Peripheral pulses palpable 2+ bilaterally    TELEMETRY: 	      LABS:	 	                        8.2    5.3   )-----------( 153      ( 25 Feb 2019 08:16 )             24.7     02-25    138  |  96<L>  |  68.0<H>  ----------------------------<  259<H>  4.6   |  36.0<H>  |  1.14    Ca    8.3<L>      25 Feb 2019 08:16  Phos  2.7     02-25  Mg     2.1     02-25

## 2019-02-25 NOTE — PROGRESS NOTE ADULT - ASSESSMENT
Pt is a 62 yo presenting to ED w/ SOB 2/2 diastolic HF exacerbation and noted to have KEITH and hyperkalemia, PMH CAD s/p CABG and PCI, PAD s/p baloon angioplasty, DM2, HTN, HLD, hx of cardiac arrest, recent GI bleed last month, Diastolic CHF, legally blind, pleural effusion requiring thoracentesis in Dec 2018.     1) Lower GI Bleed likely diverticular  - s/p 1 unit of PRBCs, Hg increased from 7.8 to 8.2 today, clear for d/c tomorrow as per GI  - GI Consult appreciated  - s/p Colonoscopy   ~ Poor prep throughout the colon. Diverticulosis seen. Unable to visualize colon sufficiently for poor prep  No further GI w/u recommended, ASA resumed as per GI, plavix yet on hold  Advance diet as tolerated if no plans for further GI w/u    2) Hyperkalemia  - Hold Losartan    3) Acute on Chronic Diastolic Heart Failure with severe PHTN & biventricular HF  - Improved   - off Lasix Infusion,  c/w coreg & imdur.  CXR with worsening pulm vasc congestion, increasing BNP,  Demadex 20 mg BID switched to lasix 40mg IV q12, diuresing well  - Hold Losartan due to hyperkalemia   - Cardiology input appreciated   - ECHO shows EF of > 75%. Moderate to Severe TR, mod-severe PHTN  Cardio Dr. Piña re-called for follow up, await call back    4) Acute on Chronic Hypercapnic Respiratory Failure  - H/O OHS/KYREE  - Continue NIV (AVAPS) Nocturnal and PRN (will need on discharge). Eventual SINGH with NIV   Pulm recommend chronic NIV for chronic hypercarbic respiratory failure to improve symptoms and to decrease hospitalizations; BiPAP was considered and tried without resolution of hypercarbia  - ICU Consult appreciated.   - Pulmonary input appreciated  - Avoid Narcotics for pain  Low dose steroids added for tight airways, taper off quickly    5) Acute on chronic kidney injury  - Improved  - Avoid nephrotoxic medications  - Renal input appreciated    6) CAD stents in 2014 / PAD / HLD / HTN  - Continue, Imdur, Lipitor, Coreg and Norvasc   - Plavix on hold since last admission due to GI Bleed. ASA resumed as per GI, plavix yet on hold    7) DM 2  - HbA1c 5.6  - Accu checks and ISS  - Increase Lantus to 10 units at bedtime    8) Anemia  - Multifactorial (chronic, now complicated with acute bleeding)  - Plan as above  - s/p Venofer   - Continue Procrit    9) Transaminitis  - Likely secondary to liver congestion  - Improved    10) History of Depression  - Continue Duloxetine 60 mg    11) IV infiltration LUE  - Arm elevation  - Warm compresses   - No DVT     Elevated BUN likely due to steroids    Lethargy- new onset, will check ABG, if no improvement then CT head (as per RN pt was awake all night)    DVT Prophylaxis -- IPC     Dispo: SINGH. Will need NIV on discharge (CM & SW aware). Pt is a 62 yo presenting to ED w/ SOB 2/2 diastolic HF exacerbation and noted to have KEITH and hyperkalemia, PMH CAD s/p CABG and PCI, PAD s/p baloon angioplasty, DM2, HTN, HLD, hx of cardiac arrest, recent GI bleed last month, Diastolic CHF, legally blind, pleural effusion requiring thoracentesis in Dec 2018.     1) Lower GI Bleed likely diverticular  - s/p 1 unit of PRBCs, Hg increased from 7.8 to 8.2 today, clear for d/c tomorrow as per GI  - GI Consult appreciated  - s/p Colonoscopy   ~ Poor prep throughout the colon. Diverticulosis seen. Unable to visualize colon sufficiently for poor prep  No further GI w/u recommended, ASA resumed as per GI, plavix yet on hold  Advance diet as tolerated if no plans for further GI w/u    2) Hyperkalemia  - Hold Losartan    3) Acute on Chronic Diastolic Heart Failure with severe PHTN & biventricular HF- now with worsening   - Was initially on Lasix Infusion which is now off,  CXR with worsening pulm vasc congestion, increasing BNP,  Demadex 20 mg BID was switched to lasix 40mg IV q12 yesterday, diuresing well, Strict I & O, daily weights   c/w coreg & imdur.  - Hold Losartan due to hyperkalemia   - ECHO shows EF of > 75%. Moderate to Severe TR, mod-severe PHTN  Cardio Dr. Piña re-called for follow up, await call back    4) Acute on Chronic Hypercapnic Respiratory Failure  - H/O OHS/KYREE  - Continue NIV (AVAPS) Nocturnal and PRN (will need on discharge). Eventual SINGH with NIV   Pulm recommend chronic NIV for chronic hypercarbic respiratory failure to improve symptoms and to decrease hospitalizations; BiPAP was considered and tried without resolution of hypercarbia  - ICU Consult appreciated.   - Pulmonary input appreciated  - Avoid Narcotics for pain  Low dose steroids added for tight airways, taper off quickly    5) Acute on chronic kidney injury  - Improved  - Avoid nephrotoxic medications  - Renal input appreciated    6) CAD stents in 2014 / PAD / HLD / HTN  - Continue, Imdur, Lipitor, Coreg and Norvasc   - Plavix on hold since last admission due to GI Bleed. ASA resumed as per GI, plavix yet on hold    7) DM 2  - HbA1c 5.6  - Accu checks and ISS  - Increase Lantus to 10 units at bedtime    8) Anemia  - Multifactorial (chronic, now complicated with acute bleeding)  - Plan as above  - s/p Venofer   - Continue Procrit    9) Transaminitis  - Likely secondary to liver congestion  - Improved    10) History of Depression  - Continue Duloxetine 60 mg    11) IV infiltration LUE  - Arm elevation  - Warm compresses   - No DVT     Elevated BUN likely due to steroids    Lethargy- new onset, will check ABG, if no improvement then CT head (as per RN pt was awake all night)    DVT Prophylaxis -- IPC     Dispo: SINGH. Will need NIV on discharge (CM & SW aware).

## 2019-02-25 NOTE — PROGRESS NOTE ADULT - SUBJECTIVE AND OBJECTIVE BOX
CHIEF COMPLAINT/INTERVAL HISTORY:    Patient is a 63y old  Female who presents with a chief complaint of SOB (23 Feb 2019 12:54)      HPI:  Pt is a 62 yo presenting to ED w/ SOB, PMH CAD s/p CABG and PCI, PAD s/p baloon angioplasty, DM2, HTN, HLD, hx of cardiac arrest, recent GI bleed last month, Diastolic CHF, legally blind, pleural effusion requiring thoracentesis in Dec 2018.   Patient states that she has been having increasing SOB over the last week, she lives at home with her family, denies sick contacts, but has been having more exertional dyspnea. She states she walks with a walker but it is taking less activity for her dyspnea to occur. She has been compliant with all of her medications, she does not recall the names, but states that there have been no changes to her medication regimen since her discharge. She denies any associated chest pain or pressure. She has been taking her tosemide she believes. Denies PND or orthopnea, but she has noted increased LE edema.   Denies headaches, nausea, vomiting, chest pain, palpitations, abdominal pain, constipation, diarrhea, melena, hematochezia, dysuria.   Her SOB has since resolved. She is breathing comfortably, has no complaints.  In ED patient was noted to have hyperkalemia on repeat labs, troponin elevated at 0.09. Her creatinine is also elevated. She did not receive any potassium treatment initially so I ordered Calcium gluc, kayexelate, Insulin, dextrose, Albuterol. EKG without widened QRS or peaked T waves. (11 Feb 2019 03:58)      SUBJECTIVE & OBJECTIVE/ ROS: Pt seen and examined at bedside. Doing well. No GIB reported overnight as per RN. No further presyncopal episodes. SOB improved as per pt. Pt appears more lethargic today but is arousable  No chest pain, palpitations,  light headedness/dizziness, fevers/chills, abdominal pain, n/v, diarrhea/constipation, dysuria or increased urinary frequency.     ICU Vital Signs Last 24 Hrs  T(C): 36.4 (24 Feb 2019 09:10), Max: 36.8 (23 Feb 2019 21:14)  T(F): 97.5 (24 Feb 2019 09:10), Max: 98.2 (23 Feb 2019 21:14)  HR: 64 (24 Feb 2019 09:15) (61 - 66)  BP: 116/61 (24 Feb 2019 09:15) (95/57 - 139/63)  BP(mean): --  ABP: --  ABP(mean): --  RR: 18 (23 Feb 2019 21:14) (18 - 18)  SpO2: 93% (24 Feb 2019 09:10) (93% - 99%)        MEDICATIONS  (STANDING):  ALBUTerol    0.083% 10 milliGRAM(s) Nebulizer once  amLODIPine   Tablet 10 milliGRAM(s) Oral daily  aspirin enteric coated 81 milliGRAM(s) Oral daily  atorvastatin 80 milliGRAM(s) Oral at bedtime  carvedilol 25 milliGRAM(s) Oral every 12 hours  dextrose 5%. 1000 milliLiter(s) (50 mL/Hr) IV Continuous <Continuous>  dextrose 50% Injectable 12.5 Gram(s) IV Push once  dextrose 50% Injectable 25 Gram(s) IV Push once  dextrose 50% Injectable 25 Gram(s) IV Push once  docusate sodium 100 milliGRAM(s) Oral two times a day  DULoxetine 60 milliGRAM(s) Oral daily  epoetin sara Injectable 45344 Unit(s) SubCutaneous <User Schedule>  gabapentin 100 milliGRAM(s) Oral three times a day  insulin lispro (HumaLOG) corrective regimen sliding scale   SubCutaneous three times a day before meals  isosorbide   mononitrate ER Tablet (IMDUR) 60 milliGRAM(s) Oral daily  magnesium oxide 400 milliGRAM(s) Oral daily  multiple electrolytes Injection Type 1 1000 milliLiter(s) (65 mL/Hr) IV Continuous <Continuous>  pantoprazole    Tablet 40 milliGRAM(s) Oral before breakfast    MEDICATIONS  (PRN):  acetaminophen   Tablet .. 650 milliGRAM(s) Oral every 6 hours PRN Mild Pain (1 - 3)  dextrose 40% Gel 15 Gram(s) Oral once PRN Blood Glucose LESS THAN 70 milliGRAM(s)/deciliter  glucagon  Injectable 1 milliGRAM(s) IntraMuscular once PRN Glucose LESS THAN 70 milligrams/deciliter  ondansetron Injectable 4 milliGRAM(s) IV Push every 6 hours PRN Nausea and/or Vomiting      LABS:                        7.8    4.7   )-----------( 137      ( 24 Feb 2019 09:20 )             23.9     02-24    141  |  97<L>  |  63.0<H>  ----------------------------<  213<H>  4.1   |  37.0<H>  |  0.93    Ca    7.9<L>      24 Feb 2019 09:20  Phos  2.3     02-24  Mg     2.2     02-24            CAPILLARY BLOOD GLUCOSE      POCT Blood Glucose.: 214 mg/dL (24 Feb 2019 07:24)  POCT Blood Glucose.: 315 mg/dL (23 Feb 2019 21:42)  POCT Blood Glucose.: 276 mg/dL (23 Feb 2019 16:47)  POCT Blood Glucose.: 387 mg/dL (23 Feb 2019 13:27)      RECENT CULTURES:      RADIOLOGY & ADDITIONAL TESTS:      PHYSICAL EXAM:    General: No acute distress, morbidly obese  HEENT: Opaque right cornea. EOMI. Clear conjunctivae. Moist mucus membrane  Neck: Supple. No JVD.   Chest: decreased BS b/l, no wheezing  Heart: Normal S1 & S2. RRR. No murmur.   Abdomen: Soft. Non-tender. Non-distended. + BS. Abdominal wall edema.   Ext: 2+ pedal edema. No calf tenderness. 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 Dry scab on right heel. Old healed scars on left leg. Swelling in LUE due to IV infiltration. Neurovascular intact.   Neuro: lethargic but arousable. No focal deficit.   Skin: Warm and Dry  Psychiatry: Normal mood and affect

## 2019-02-25 NOTE — PROGRESS NOTE ADULT - PROBLEM SELECTOR PLAN 1
Patient with right sided failure. Now on lasix IV again.   Will do another 24 hours of IV lasix. Consider changing to higher dose of torsemide tomorrow. 40 mg bid?  patient needs to be in a structured facility after discharge.

## 2019-02-25 NOTE — PROGRESS NOTE ADULT - SUBJECTIVE AND OBJECTIVE BOX
Patient is a 63y old  Female who presents with a chief complaint of SOB (2019 12:06)      HPI:  Pt is a 64 yo presenting to ED w/ SOB, PMH CAD s/p CABG and PCI, PAD s/p baloon angioplasty, DM2, HTN, HLD, hx of cardiac arrest, recent GI bleed last month, Diastolic CHF, legally blind, pleural effusion requiring thoracentesis in Dec 2018.   Nurse at bedside. Both pt and nurse state pt has not had further rectal bleeding. No abdominal pain. Tolerated solid diet    REVIEW OF SYSTEMS:  Constitutional: No fever, weight loss or fatigue  ENMT:  No difficulty hearing, tinnitus, vertigo; No sinus or throat pain  Respiratory: No cough, wheezing, chills or hemoptysis  Cardiovascular: No chest pain, palpitations, dizziness +leg swelling-  Gastrointestinal: No abdominal or epigastric pain. No nausea, vomiting or hematemesis; No diarrhea or constipation. No melena or hematochezia.  Skin: No itching, burning, rashes or lesions   Musculoskeletal: No joint pain or swelling; No muscle, back or extremity pain    PAST MEDICAL & SURGICAL HISTORY:  Herniated disc, cervical  PAD (peripheral artery disease)  Legally blind  History of peripheral vascular disease  History of retinal detachment  History of CEA (carotid endarterectomy): Right  Hyperlipidemia  Glaucoma  Hypertension  Diabetes  S/P CABG x 1  After cataract  Uveitic glaucoma of both eyes  Detached retina  Atherosclerosis of right carotid artery   delivery delivered      FAMILY HISTORY:      SOCIAL HISTORY:  Smoking Status: [ ] Current, [ ] Former, [ ] Never  Pack Years:  [  ] EtOH-no  [  ] IVDA    MEDICATIONS:  MEDICATIONS  (STANDING):  ALBUTerol    0.083% 10 milliGRAM(s) Nebulizer once  ALBUTerol/ipratropium for Nebulization 3 milliLiter(s) Nebulizer every 6 hours  amLODIPine   Tablet 10 milliGRAM(s) Oral daily  aspirin enteric coated 81 milliGRAM(s) Oral daily  atorvastatin 80 milliGRAM(s) Oral at bedtime  carvedilol 25 milliGRAM(s) Oral every 12 hours  dextrose 5%. 1000 milliLiter(s) (50 mL/Hr) IV Continuous <Continuous>  dextrose 50% Injectable 12.5 Gram(s) IV Push once  dextrose 50% Injectable 25 Gram(s) IV Push once  dextrose 50% Injectable 25 Gram(s) IV Push once  docusate sodium 100 milliGRAM(s) Oral two times a day  DULoxetine 60 milliGRAM(s) Oral daily  epoetin sara Injectable 60325 Unit(s) SubCutaneous <User Schedule>  furosemide   Injectable 40 milliGRAM(s) IV Push every 12 hours  gabapentin 100 milliGRAM(s) Oral three times a day  insulin glargine Injectable (LANTUS) 10 Unit(s) SubCutaneous at bedtime  insulin lispro (HumaLOG) corrective regimen sliding scale   SubCutaneous three times a day before meals  isosorbide   mononitrate ER Tablet (IMDUR) 60 milliGRAM(s) Oral daily  magnesium oxide 400 milliGRAM(s) Oral daily  methylPREDNISolone sodium succinate Injectable 40 milliGRAM(s) IV Push every 6 hours  pantoprazole    Tablet 40 milliGRAM(s) Oral before breakfast    MEDICATIONS  (PRN):  acetaminophen   Tablet .. 650 milliGRAM(s) Oral every 6 hours PRN Mild Pain (1 - 3)  dextrose 40% Gel 15 Gram(s) Oral once PRN Blood Glucose LESS THAN 70 milliGRAM(s)/deciliter  glucagon  Injectable 1 milliGRAM(s) IntraMuscular once PRN Glucose LESS THAN 70 milligrams/deciliter  ondansetron Injectable 4 milliGRAM(s) IV Push every 6 hours PRN Nausea and/or Vomiting      Allergies    Accupril (Other)    Intolerances        Vital Signs Last 24 Hrs  T(C): 37.2 (2019 07:10), Max: 37.2 (2019 07:10)  T(F): 99 (2019 07:10), Max: 99 (2019 07:10)  HR: 66 (2019 07:10) (62 - 66)  BP: 151/71 (2019 07:10) (95/57 - 151/71)  BP(mean): --  RR: --  SpO2: 98% (2019 03:57) (93% - 99%)     @ 07:01  -   @ 07:00  --------------------------------------------------------  IN: 0 mL / OUT: 750 mL / NET: -750 mL      Mode: standby      PHYSICAL EXAM:    General: Well developed; well nourished; in no acute distress  HEENT: MMM, conjunctiva and sclera clear  H- RRR  L- CTA  Gastrointestinal: Soft, non-tender non-distended; Normal bowel sounds; No rebound or guarding  Extremities: Normal range of motion, No clubbing, cyanosis +edema of all extremities( anasarca)  Skin: Warm and dry. No obvious rash      LABS:                        7.8    4.7   )-----------( 137      ( 2019 09:20 )             23.9     2019 09:20    141    |  97     |  63.0   ----------------------------<  213    4.1     |  37.0   |  0.93     Ca    7.9        2019 09:20  Phos  2.3       2019 09:20  Mg     2.2       2019 09:20                RADIOLOGY & ADDITIONAL STUDIES:   < from: Xray Chest 1 View-PORTABLE IMMEDIATE (19 @ 10:07) >  IMPRESSION: Increasing CHF.        < end of copied text >

## 2019-02-25 NOTE — PROGRESS NOTE ADULT - ASSESSMENT
ARF: decompensated CHF (R > L)  Elevated BUN also due to steroids  1.1g proteinuria  - now on IV Lasix==> will need to tolerate azotemia  - monitor labs    Anemia: +CRF +GIB==> colonoscopy results noted  s/p IV Fe  - cont QUENTIN  - monitor H/H; target Hgb=10.0  - PRBCs as needed  - GI follow up noted

## 2019-02-25 NOTE — PROGRESS NOTE ADULT - SUBJECTIVE AND OBJECTIVE BOX
NEPHROLOGY INTERVAL HPI/OVERNIGHT EVENTS:  pt clinically stable  no acute issues noted  no active GI bleeding    MEDICATIONS  (STANDING):  ALBUTerol    0.083% 10 milliGRAM(s) Nebulizer once  ALBUTerol/ipratropium for Nebulization 3 milliLiter(s) Nebulizer every 6 hours  amLODIPine   Tablet 10 milliGRAM(s) Oral daily  aspirin enteric coated 81 milliGRAM(s) Oral daily  atorvastatin 80 milliGRAM(s) Oral at bedtime  carvedilol 25 milliGRAM(s) Oral every 12 hours  dextrose 5%. 1000 milliLiter(s) (50 mL/Hr) IV Continuous <Continuous>  dextrose 50% Injectable 12.5 Gram(s) IV Push once  dextrose 50% Injectable 25 Gram(s) IV Push once  dextrose 50% Injectable 25 Gram(s) IV Push once  docusate sodium 100 milliGRAM(s) Oral two times a day  DULoxetine 60 milliGRAM(s) Oral daily  epoetin sara Injectable 20051 Unit(s) SubCutaneous <User Schedule>  furosemide   Injectable 40 milliGRAM(s) IV Push every 12 hours  gabapentin 100 milliGRAM(s) Oral three times a day  insulin glargine Injectable (LANTUS) 10 Unit(s) SubCutaneous at bedtime  insulin lispro (HumaLOG) corrective regimen sliding scale   SubCutaneous three times a day before meals  isosorbide   mononitrate ER Tablet (IMDUR) 60 milliGRAM(s) Oral daily  magnesium oxide 400 milliGRAM(s) Oral daily  methylPREDNISolone sodium succinate Injectable 40 milliGRAM(s) IV Push every 6 hours  pantoprazole    Tablet 40 milliGRAM(s) Oral before breakfast    MEDICATIONS  (PRN):  acetaminophen   Tablet .. 650 milliGRAM(s) Oral every 6 hours PRN Mild Pain (1 - 3)  dextrose 40% Gel 15 Gram(s) Oral once PRN Blood Glucose LESS THAN 70 milliGRAM(s)/deciliter  glucagon  Injectable 1 milliGRAM(s) IntraMuscular once PRN Glucose LESS THAN 70 milligrams/deciliter  ondansetron Injectable 4 milliGRAM(s) IV Push every 6 hours PRN Nausea and/or Vomiting      Allergies    Accupril (Other)    Intolerances            Vital Signs Last 24 Hrs  T(C): 37.2 (25 Feb 2019 07:10), Max: 37.2 (25 Feb 2019 07:10)  T(F): 99 (25 Feb 2019 07:10), Max: 99 (25 Feb 2019 07:10)  HR: 66 (25 Feb 2019 07:10) (63 - 66)  BP: 151/71 (25 Feb 2019 07:10) (117/62 - 151/71)  BP(mean): --  RR: --  SpO2: 98% (25 Feb 2019 03:57) (97% - 99%)    PHYSICAL EXAM:  Obese  Cardiovascular: Normal S1 S2, No rub  Respiratory: Clear; diminished BS at bases	  Neuro: A & O x 3; non focal  Gastrointestinal:  Soft, Non-tender, + BS	  Extremities: trace LE edema    LABS:                        8.2    5.3   )-----------( 153      ( 25 Feb 2019 08:16 )             24.7     02-25    138  |  96<L>  |  68.0<H>  ----------------------------<  259<H>  4.6   |  36.0<H>  |  1.14    Ca    8.3<L>      25 Feb 2019 08:16  Phos  2.7     02-25  Mg     2.1     02-25          Magnesium, Serum: 2.1 mg/dL (02-25 @ 08:16)  Phosphorus Level, Serum: 2.7 mg/dL (02-25 @ 08:16)      RADIOLOGY & ADDITIONAL TESTS:

## 2019-02-26 LAB
ANION GAP SERPL CALC-SCNC: 6 MMOL/L — SIGNIFICANT CHANGE UP (ref 5–17)
BUN SERPL-MCNC: 77 MG/DL — HIGH (ref 8–20)
CALCIUM SERPL-MCNC: 8.3 MG/DL — LOW (ref 8.6–10.2)
CHLORIDE SERPL-SCNC: 97 MMOL/L — LOW (ref 98–107)
CO2 SERPL-SCNC: 35 MMOL/L — HIGH (ref 22–29)
CREAT SERPL-MCNC: 1.41 MG/DL — HIGH (ref 0.5–1.3)
EOSINOPHIL # BLD AUTO: 0 K/UL — SIGNIFICANT CHANGE UP (ref 0–0.5)
EOSINOPHIL NFR BLD AUTO: 0 % — SIGNIFICANT CHANGE UP (ref 0–6)
GLUCOSE BLDC GLUCOMTR-MCNC: 303 MG/DL — HIGH (ref 70–99)
GLUCOSE BLDC GLUCOMTR-MCNC: 326 MG/DL — HIGH (ref 70–99)
GLUCOSE BLDC GLUCOMTR-MCNC: 338 MG/DL — HIGH (ref 70–99)
GLUCOSE SERPL-MCNC: 288 MG/DL — HIGH (ref 70–115)
HCT VFR BLD CALC: 24.3 % — LOW (ref 37–47)
HGB BLD-MCNC: 8 G/DL — LOW (ref 12–16)
LYMPHOCYTES # BLD AUTO: 0.4 K/UL — LOW (ref 1–4.8)
LYMPHOCYTES # BLD AUTO: 6 % — LOW (ref 20–55)
MAGNESIUM SERPL-MCNC: 2.2 MG/DL — SIGNIFICANT CHANGE UP (ref 1.6–2.6)
MCHC RBC-ENTMCNC: 29.6 PG — SIGNIFICANT CHANGE UP (ref 27–31)
MCHC RBC-ENTMCNC: 32.9 G/DL — SIGNIFICANT CHANGE UP (ref 32–36)
MCV RBC AUTO: 90 FL — SIGNIFICANT CHANGE UP (ref 81–99)
MONOCYTES # BLD AUTO: 0.5 K/UL — SIGNIFICANT CHANGE UP (ref 0–0.8)
MONOCYTES NFR BLD AUTO: 7.5 % — SIGNIFICANT CHANGE UP (ref 3–10)
NEUTROPHILS # BLD AUTO: 5.3 K/UL — SIGNIFICANT CHANGE UP (ref 1.8–8)
NEUTROPHILS NFR BLD AUTO: 86.3 % — HIGH (ref 37–73)
PHOSPHATE SERPL-MCNC: 2.6 MG/DL — SIGNIFICANT CHANGE UP (ref 2.4–4.7)
PLATELET # BLD AUTO: 163 K/UL — SIGNIFICANT CHANGE UP (ref 150–400)
POTASSIUM SERPL-MCNC: 5.1 MMOL/L — SIGNIFICANT CHANGE UP (ref 3.5–5.3)
POTASSIUM SERPL-SCNC: 5.1 MMOL/L — SIGNIFICANT CHANGE UP (ref 3.5–5.3)
RBC # BLD: 2.7 M/UL — LOW (ref 4.4–5.2)
RBC # FLD: 16.5 % — HIGH (ref 11–15.6)
SODIUM SERPL-SCNC: 138 MMOL/L — SIGNIFICANT CHANGE UP (ref 135–145)
WBC # BLD: 6.2 K/UL — SIGNIFICANT CHANGE UP (ref 4.8–10.8)
WBC # FLD AUTO: 6.2 K/UL — SIGNIFICANT CHANGE UP (ref 4.8–10.8)

## 2019-02-26 PROCEDURE — 99233 SBSQ HOSP IP/OBS HIGH 50: CPT

## 2019-02-26 PROCEDURE — 71045 X-RAY EXAM CHEST 1 VIEW: CPT | Mod: 26

## 2019-02-26 PROCEDURE — 71250 CT THORAX DX C-: CPT | Mod: 26

## 2019-02-26 RX ORDER — POLYETHYLENE GLYCOL 3350 17 G/17G
17 POWDER, FOR SOLUTION ORAL DAILY
Qty: 0 | Refills: 0 | Status: DISCONTINUED | OUTPATIENT
Start: 2019-02-26 | End: 2019-03-26

## 2019-02-26 RX ORDER — SENNA PLUS 8.6 MG/1
2 TABLET ORAL AT BEDTIME
Qty: 0 | Refills: 0 | Status: DISCONTINUED | OUTPATIENT
Start: 2019-02-26 | End: 2019-03-26

## 2019-02-26 RX ADMIN — Medication 40 MILLIGRAM(S): at 05:13

## 2019-02-26 RX ADMIN — MAGNESIUM OXIDE 400 MG ORAL TABLET 400 MILLIGRAM(S): 241.3 TABLET ORAL at 11:51

## 2019-02-26 RX ADMIN — Medication 3 MILLILITER(S): at 20:24

## 2019-02-26 RX ADMIN — CARVEDILOL PHOSPHATE 25 MILLIGRAM(S): 80 CAPSULE, EXTENDED RELEASE ORAL at 17:25

## 2019-02-26 RX ADMIN — Medication 100 MILLIGRAM(S): at 17:25

## 2019-02-26 RX ADMIN — POLYETHYLENE GLYCOL 3350 17 GRAM(S): 17 POWDER, FOR SOLUTION ORAL at 11:51

## 2019-02-26 RX ADMIN — Medication 3 MILLILITER(S): at 08:36

## 2019-02-26 RX ADMIN — ATORVASTATIN CALCIUM 80 MILLIGRAM(S): 80 TABLET, FILM COATED ORAL at 21:39

## 2019-02-26 RX ADMIN — INSULIN GLARGINE 10 UNIT(S): 100 INJECTION, SOLUTION SUBCUTANEOUS at 21:39

## 2019-02-26 RX ADMIN — DULOXETINE HYDROCHLORIDE 60 MILLIGRAM(S): 30 CAPSULE, DELAYED RELEASE ORAL at 11:45

## 2019-02-26 RX ADMIN — ISOSORBIDE MONONITRATE 60 MILLIGRAM(S): 60 TABLET, EXTENDED RELEASE ORAL at 11:45

## 2019-02-26 RX ADMIN — Medication 81 MILLIGRAM(S): at 11:45

## 2019-02-26 RX ADMIN — Medication 3 MILLILITER(S): at 15:07

## 2019-02-26 RX ADMIN — Medication 100 MILLIGRAM(S): at 05:13

## 2019-02-26 RX ADMIN — Medication 4: at 07:49

## 2019-02-26 RX ADMIN — Medication 4: at 11:44

## 2019-02-26 RX ADMIN — SENNA PLUS 2 TABLET(S): 8.6 TABLET ORAL at 21:40

## 2019-02-26 RX ADMIN — Medication 40 MILLIGRAM(S): at 17:24

## 2019-02-26 RX ADMIN — AMLODIPINE BESYLATE 10 MILLIGRAM(S): 2.5 TABLET ORAL at 05:13

## 2019-02-26 RX ADMIN — Medication 3 MILLILITER(S): at 03:07

## 2019-02-26 RX ADMIN — PANTOPRAZOLE SODIUM 40 MILLIGRAM(S): 20 TABLET, DELAYED RELEASE ORAL at 05:13

## 2019-02-26 RX ADMIN — CARVEDILOL PHOSPHATE 25 MILLIGRAM(S): 80 CAPSULE, EXTENDED RELEASE ORAL at 05:13

## 2019-02-26 RX ADMIN — GABAPENTIN 100 MILLIGRAM(S): 400 CAPSULE ORAL at 14:12

## 2019-02-26 RX ADMIN — GABAPENTIN 100 MILLIGRAM(S): 400 CAPSULE ORAL at 21:40

## 2019-02-26 RX ADMIN — Medication 4: at 16:15

## 2019-02-26 RX ADMIN — Medication 40 MILLIGRAM(S): at 05:12

## 2019-02-26 RX ADMIN — GABAPENTIN 100 MILLIGRAM(S): 400 CAPSULE ORAL at 05:13

## 2019-02-26 NOTE — PROGRESS NOTE ADULT - ASSESSMENT
63-year-old for whom GI was consulted for rectal bleeding.  Underwent colonoscopy with suboptimal prep, but no bleeding was seen.  Given that she has not moved her bowels and her hemoglobin has been stable, it is very unlikely that she is still bleeding.  No further workup planned.    GI will sign off.  Please reconsult as needed and call with any questions or concerns.

## 2019-02-26 NOTE — PROGRESS NOTE ADULT - SUBJECTIVE AND OBJECTIVE BOX
NEPHROLOGY INTERVAL HPI/OVERNIGHT EVENTS:  pt comfortable lying flat  no acute distress noted    MEDICATIONS  (STANDING):  ALBUTerol    0.083% 10 milliGRAM(s) Nebulizer once  ALBUTerol/ipratropium for Nebulization 3 milliLiter(s) Nebulizer every 6 hours  amLODIPine   Tablet 10 milliGRAM(s) Oral daily  aspirin enteric coated 81 milliGRAM(s) Oral daily  atorvastatin 80 milliGRAM(s) Oral at bedtime  carvedilol 25 milliGRAM(s) Oral every 12 hours  dextrose 5%. 1000 milliLiter(s) (50 mL/Hr) IV Continuous <Continuous>  dextrose 50% Injectable 12.5 Gram(s) IV Push once  dextrose 50% Injectable 25 Gram(s) IV Push once  dextrose 50% Injectable 25 Gram(s) IV Push once  docusate sodium 100 milliGRAM(s) Oral two times a day  DULoxetine 60 milliGRAM(s) Oral daily  epoetin sara Injectable 59913 Unit(s) SubCutaneous <User Schedule>  furosemide   Injectable 40 milliGRAM(s) IV Push every 12 hours  gabapentin 100 milliGRAM(s) Oral three times a day  insulin glargine Injectable (LANTUS) 10 Unit(s) SubCutaneous at bedtime  insulin lispro (HumaLOG) corrective regimen sliding scale   SubCutaneous three times a day before meals  isosorbide   mononitrate ER Tablet (IMDUR) 60 milliGRAM(s) Oral daily  magnesium oxide 400 milliGRAM(s) Oral daily  methylPREDNISolone sodium succinate Injectable 40 milliGRAM(s) IV Push every 12 hours  pantoprazole    Tablet 40 milliGRAM(s) Oral before breakfast    MEDICATIONS  (PRN):  acetaminophen   Tablet .. 650 milliGRAM(s) Oral every 6 hours PRN Mild Pain (1 - 3)  dextrose 40% Gel 15 Gram(s) Oral once PRN Blood Glucose LESS THAN 70 milliGRAM(s)/deciliter  glucagon  Injectable 1 milliGRAM(s) IntraMuscular once PRN Glucose LESS THAN 70 milligrams/deciliter  ondansetron Injectable 4 milliGRAM(s) IV Push every 6 hours PRN Nausea and/or Vomiting      Allergies    Accupril (Other)          Vital Signs Last 24 Hrs  T(C): 36.9 (26 Feb 2019 07:52), Max: 36.9 (26 Feb 2019 07:52)  T(F): 98.4 (26 Feb 2019 07:52), Max: 98.4 (26 Feb 2019 07:52)  HR: 60 (26 Feb 2019 09:02) (60 - 70)  BP: 135/76 (26 Feb 2019 07:52) (116/60 - 140/76)  BP(mean): --  RR: 20 (26 Feb 2019 07:52) (19 - 20)  SpO2: 98% (26 Feb 2019 09:02) (96% - 100%)    PHYSICAL EXAM:  Comfortable  Cardiovascular: Normal S1 S2, No rub  Respiratory: Clear lungs with decreased basilar BS 	  Neuro: A & O x 3; non focal  Gastrointestinal:  Soft, Non-tender, + BS	  Extremities: trace LE edema    LABS:                        8.0    6.2   )-----------( 163      ( 26 Feb 2019 08:13 )             24.3     02-26    138  |  97<L>  |  77.0<H>  ----------------------------<  288<H>  5.1   |  35.0<H>  |  1.41<H>    Ca    8.3<L>      26 Feb 2019 08:13  Phos  2.6     02-26  Mg     2.2     02-26          Magnesium, Serum: 2.2 mg/dL (02-26 @ 08:13)  Phosphorus Level, Serum: 2.6 mg/dL (02-26 @ 08:13)      RADIOLOGY & ADDITIONAL TESTS:  < from: Xray Chest 1 View- PORTABLE-Routine (02.26.19 @ 05:38) >     EXAM:  XR CHEST PORTABLE ROUTINE 1V                          PROCEDURE DATE:  02/26/2019          INTERPRETATION:  CLINICAL STATEMENT: Follow-up chest pain.    TECHNIQUE: AP view of the chest.    COMPARISON: 2/24/2019    FINDINGS/  IMPRESSION:  Sternotomy wires again noted. Mild increased interstitial lung markings   without significant change. Opacity right lung which may represent right   pleural effusion and/or airspace opacity without significant change given   differences in technique. Nonspecific lucencies overlies the right chest.   Repeat chest x-ray recommended.    Heart size cannot be accurately assessed in this projection, but appear   enlarged.    < end of copied text >

## 2019-02-26 NOTE — PROGRESS NOTE ADULT - SUBJECTIVE AND OBJECTIVE BOX
CHIEF COMPLAINT/INTERVAL HISTORY:    Patient is a 63y old  Female who presents with a chief complaint of SOB (23 Feb 2019 12:54)      HPI:  Pt is a 64 yo presenting to ED w/ SOB, PMH CAD s/p CABG and PCI, PAD s/p baloon angioplasty, DM2, HTN, HLD, hx of cardiac arrest, recent GI bleed last month, Diastolic CHF, legally blind, pleural effusion requiring thoracentesis in Dec 2018.   Patient states that she has been having increasing SOB over the last week, she lives at home with her family, denies sick contacts, but has been having more exertional dyspnea. She states she walks with a walker but it is taking less activity for her dyspnea to occur. She has been compliant with all of her medications, she does not recall the names, but states that there have been no changes to her medication regimen since her discharge. She denies any associated chest pain or pressure. She has been taking her tosemide she believes. Denies PND or orthopnea, but she has noted increased LE edema.   Denies headaches, nausea, vomiting, chest pain, palpitations, abdominal pain, constipation, diarrhea, melena, hematochezia, dysuria.   Her SOB has since resolved. She is breathing comfortably, has no complaints.  In ED patient was noted to have hyperkalemia on repeat labs, troponin elevated at 0.09. Her creatinine is also elevated. She did not receive any potassium treatment initially so I ordered Calcium gluc, kayexelate, Insulin, dextrose, Albuterol. EKG without widened QRS or peaked T waves. (11 Feb 2019 03:58)      SUBJECTIVE & OBJECTIVE/ ROS: Pt seen and examined at bedside. Doing well. No GIB reported overnight as per RN. No further presyncopal episodes. SOB improved as per pt. Lethargy resolved. No chest pain, palpitations,  light headedness/dizziness, fevers/chills, abdominal pain, n/v, diarrhea/constipation, dysuria or increased urinary frequency.     ICU Vital Signs Last 24 Hrs  T(C): 36.4 (24 Feb 2019 09:10), Max: 36.8 (23 Feb 2019 21:14)  T(F): 97.5 (24 Feb 2019 09:10), Max: 98.2 (23 Feb 2019 21:14)  HR: 64 (24 Feb 2019 09:15) (61 - 66)  BP: 116/61 (24 Feb 2019 09:15) (95/57 - 139/63)  BP(mean): --  ABP: --  ABP(mean): --  RR: 18 (23 Feb 2019 21:14) (18 - 18)  SpO2: 93% (24 Feb 2019 09:10) (93% - 99%)        MEDICATIONS  (STANDING):  ALBUTerol    0.083% 10 milliGRAM(s) Nebulizer once  amLODIPine   Tablet 10 milliGRAM(s) Oral daily  aspirin enteric coated 81 milliGRAM(s) Oral daily  atorvastatin 80 milliGRAM(s) Oral at bedtime  carvedilol 25 milliGRAM(s) Oral every 12 hours  dextrose 5%. 1000 milliLiter(s) (50 mL/Hr) IV Continuous <Continuous>  dextrose 50% Injectable 12.5 Gram(s) IV Push once  dextrose 50% Injectable 25 Gram(s) IV Push once  dextrose 50% Injectable 25 Gram(s) IV Push once  docusate sodium 100 milliGRAM(s) Oral two times a day  DULoxetine 60 milliGRAM(s) Oral daily  epoetin sara Injectable 46196 Unit(s) SubCutaneous <User Schedule>  gabapentin 100 milliGRAM(s) Oral three times a day  insulin lispro (HumaLOG) corrective regimen sliding scale   SubCutaneous three times a day before meals  isosorbide   mononitrate ER Tablet (IMDUR) 60 milliGRAM(s) Oral daily  magnesium oxide 400 milliGRAM(s) Oral daily  multiple electrolytes Injection Type 1 1000 milliLiter(s) (65 mL/Hr) IV Continuous <Continuous>  pantoprazole    Tablet 40 milliGRAM(s) Oral before breakfast    MEDICATIONS  (PRN):  acetaminophen   Tablet .. 650 milliGRAM(s) Oral every 6 hours PRN Mild Pain (1 - 3)  dextrose 40% Gel 15 Gram(s) Oral once PRN Blood Glucose LESS THAN 70 milliGRAM(s)/deciliter  glucagon  Injectable 1 milliGRAM(s) IntraMuscular once PRN Glucose LESS THAN 70 milligrams/deciliter  ondansetron Injectable 4 milliGRAM(s) IV Push every 6 hours PRN Nausea and/or Vomiting      LABS:                        7.8    4.7   )-----------( 137      ( 24 Feb 2019 09:20 )             23.9     02-24    141  |  97<L>  |  63.0<H>  ----------------------------<  213<H>  4.1   |  37.0<H>  |  0.93    Ca    7.9<L>      24 Feb 2019 09:20  Phos  2.3     02-24  Mg     2.2     02-24            CAPILLARY BLOOD GLUCOSE      POCT Blood Glucose.: 214 mg/dL (24 Feb 2019 07:24)  POCT Blood Glucose.: 315 mg/dL (23 Feb 2019 21:42)  POCT Blood Glucose.: 276 mg/dL (23 Feb 2019 16:47)  POCT Blood Glucose.: 387 mg/dL (23 Feb 2019 13:27)      RECENT CULTURES:      RADIOLOGY & ADDITIONAL TESTS:      PHYSICAL EXAM:    General: No acute distress, morbidly obese  HEENT: Opaque right cornea. EOMI. Clear conjunctivae. Moist mucus membrane  Neck: Supple. No JVD.   Chest: decreased BS b/l, no wheezing  Heart: Normal S1 & S2. RRR. No murmur.   Abdomen: Soft. Non-tender. Non-distended. + BS. Abdominal wall edema.   Ext: 2+ pedal edema. No calf tenderness. 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 Dry scab on right heel. Old healed scars on left leg. Swelling in LUE due to IV infiltration. Neurovascular intact.   Neuro: AAO x 3. No focal deficit.   Skin: Warm and Dry  Psychiatry: Normal mood and affect

## 2019-02-26 NOTE — PROGRESS NOTE ADULT - SUBJECTIVE AND OBJECTIVE BOX
Patient with increasing creatinine. Most likely intravascular depletion. Will hold lasix today. based on labs in am, will restart on torsemide 20 mg po bid.

## 2019-02-26 NOTE — PROGRESS NOTE ADULT - ASSESSMENT
ARF: decompensated CHF (R > L)==>pre renal   Elevated BUN also due to steroids  1.1g proteinuria  - continue IV Lasix and adjust dose as needed  - monitor labs; will need to keep azotemic    Anemia: +CRF +GIB==> s/p colonoscopy   s/p IV Fe  - cont QUENTIN  - monitor H/H; target Hgb=10.0  - may need PRBCs

## 2019-02-26 NOTE — PROGRESS NOTE ADULT - ASSESSMENT
Pt is a 62 yo presenting to ED w/ SOB 2/2 diastolic HF exacerbation and noted to have KEITH and hyperkalemia, PMH CAD s/p CABG and PCI, PAD s/p baloon angioplasty, DM2, HTN, HLD, hx of cardiac arrest, recent GI bleed last month, Diastolic CHF, legally blind, pleural effusion requiring thoracentesis in Dec 2018.     1) Lower GI Bleed likely diverticular  - s/p 1 unit of PRBC  - GI Consult appreciated  - s/p Colonoscopy   ~ Poor prep throughout the colon. Diverticulosis seen. Unable to visualize colon sufficiently for poor prep  No further GI w/u recommended, ASA resumed as per GI, plavix yet on hold  Hg increased from 7.8 to 8.0 today, no further reported bleeding. Clear for d/c as per GI  Advanced diet     2) Hyperkalemia  - Hold Losartan    3) Acute on Chronic Diastolic Heart Failure with severe PHTN  & right ventricular failure- now with worsening   - Was initially on Lasix Infusion which is now off,  CXR on 2/24 with worsening pulm vasc congestion, increasing BNP,  Demadex 20 mg BID was switched to lasix 40mg IV q12 with wheezing improved, urine output was 850cc since yesterday, Strict I & O, daily weights. Repeat CXR  on 2/26 with slight improvement in PVC. Opacity right lung which may represent right pleural effusion and/or airspace opacity. Nonspecific lucencies overlies the right chest. CT chest ordered  c/w coreg & imdur.  - Hold Losartan due to hyperkalemia   - ECHO shows EF of > 75%. Moderate to Severe TR, mod-severe PHTN  Cardio following    4) Acute on Chronic Hypercapnic Respiratory Failure  - H/O OHS/KYREE  - Continue NIV (AVAPS) Nocturnal and PRN (will need on discharge). Eventual [facility] with NIV (pt has AVAPS at home but settings need to be changed, CM working on it)   Pulm recommend chronic NIV for chronic hypercarbic respiratory failure to improve symptoms and to decrease hospitalizations; BiPAP was considered and tried without resolution of hypercarbia  - ICU Consult appreciated.   - Pulmonary input appreciated  - Avoid Narcotics for pain  Low dose steroids added for tight airways, taper off quickly    5) Acute on chronic kidney injury  - Improved  - Avoid nephrotoxic medications  - Renal input appreciated    6) CAD stents in 2014 / PAD / HLD / HTN  - Continue, Imdur, Lipitor, Coreg and Norvasc   - Plavix on hold since last admission due to GI Bleed. ASA resumed as per GI, plavix yet on hold    7) DM 2  - HbA1c 5.6  - Accu checks and ISS  - Increase Lantus to 10 units at bedtime    8) Anemia  - Multifactorial (chronic, now complicated with acute bleeding)  - Plan as above  - s/p Venofer   - Continue Procrit    9) Transaminitis  - Likely secondary to liver congestion  - Improved    10) History of Depression  - Continue Duloxetine 60 mg    11) IV infiltration LUE  - Arm elevation  - Warm compresses   - No DVT     Elevated BUN likely due to steroids    Lethargy- improved. ABG ordered but not done yesterday    DVT Prophylaxis -- IPC     Dispo: [facility]. Will need NIV on discharge (CM & SW aware).

## 2019-02-26 NOTE — PROGRESS NOTE ADULT - SUBJECTIVE AND OBJECTIVE BOX
Chief Complaint: This is a 63y old Female patient being seen for follow-up consultation for rectal bleeding.    HPI:  Patient denies any further rectal bleeding or any bowel movements over the last few days.    ROS: A 14-point review of systems was reviewed and was otherwise negative save what was reported in the HPI.    PAST MEDICAL/SURGICAL HISTORY:  Herniated disc, cervical  PAD (peripheral artery disease)  Legally blind  History of peripheral vascular disease  History of retinal detachment  History of CEA (carotid endarterectomy): Right  Hyperlipidemia  Glaucoma  Hypertension  Diabetes  S/P CABG x 1  After cataract  Uveitic glaucoma of both eyes  Detached retina  Atherosclerosis of right carotid artery   delivery delivered    MEDICATIONS  (STANDING):  ALBUTerol    0.083% 10 milliGRAM(s) Nebulizer once  ALBUTerol/ipratropium for Nebulization 3 milliLiter(s) Nebulizer every 6 hours  amLODIPine   Tablet 10 milliGRAM(s) Oral daily  aspirin enteric coated 81 milliGRAM(s) Oral daily  atorvastatin 80 milliGRAM(s) Oral at bedtime  carvedilol 25 milliGRAM(s) Oral every 12 hours  dextrose 5%. 1000 milliLiter(s) (50 mL/Hr) IV Continuous <Continuous>  dextrose 50% Injectable 12.5 Gram(s) IV Push once  dextrose 50% Injectable 25 Gram(s) IV Push once  dextrose 50% Injectable 25 Gram(s) IV Push once  docusate sodium 100 milliGRAM(s) Oral two times a day  DULoxetine 60 milliGRAM(s) Oral daily  epoetin sara Injectable 25158 Unit(s) SubCutaneous <User Schedule>  furosemide   Injectable 40 milliGRAM(s) IV Push every 12 hours  gabapentin 100 milliGRAM(s) Oral three times a day  insulin glargine Injectable (LANTUS) 10 Unit(s) SubCutaneous at bedtime  insulin lispro (HumaLOG) corrective regimen sliding scale   SubCutaneous three times a day before meals  isosorbide   mononitrate ER Tablet (IMDUR) 60 milliGRAM(s) Oral daily  magnesium oxide 400 milliGRAM(s) Oral daily  methylPREDNISolone sodium succinate Injectable 40 milliGRAM(s) IV Push every 12 hours  pantoprazole    Tablet 40 milliGRAM(s) Oral before breakfast    MEDICATIONS  (PRN):  acetaminophen   Tablet .. 650 milliGRAM(s) Oral every 6 hours PRN Mild Pain (1 - 3)  dextrose 40% Gel 15 Gram(s) Oral once PRN Blood Glucose LESS THAN 70 milliGRAM(s)/deciliter  glucagon  Injectable 1 milliGRAM(s) IntraMuscular once PRN Glucose LESS THAN 70 milligrams/deciliter  ondansetron Injectable 4 milliGRAM(s) IV Push every 6 hours PRN Nausea and/or Vomiting    VITAL SIGNS LAST 24 HOURS:  T(C): 36.9 (2019 07:52), Max: 36.9 (2019 07:52)  T(F): 98.4 (2019 07:52), Max: 98.4 (2019 07:52)  HR: 60 (2019 09:02) (60 - 70)  BP: 135/76 (2019 07:52) (116/60 - 140/76)  BP(mean): --  RR: 20 (2019 07:52) (19 - 20)  SpO2: 98% (2019 09:02) (96% - 100%)  Mode: [facility]    19 @ 07:01  -  19 @ 07:00  --------------------------------------------------------  IN: 0 mL / OUT: 500 mL / NET: -500 mL      PHYSICAL EXAM:  Constitutional: Obese, -American woman in no distress  Eyes: Sclerae anicteric  ENMT: Mucus membranes moist, no oropharyngeal thrush noted  Neck: No thyroid nodules appreciated, no significant cervical or supraclavicular lymphadenopathy  Respiratory: Breathing nonlabored; clear to auscultation  Cardiovascular: Regular rate and rhythm  Gastrointestinal: Soft, nontender, nondistended, normoactive bowel sounds; no hepatosplenomegaly appreciated; no rebound tenderness or involuntary guarding  Extremities: No clubbing, cyanosis or edema  Neurological: Alert and oriented to person, place and time; no asterixis  Skin: No jaundice  Lymph Nodes: No significant lymphadenopathy  Musculoskeletal: No significant peripheral atrophy  Psychiatric: Affect and mood appropriate                            8.0    6.2   )-----------( 163      ( 2019 08:13 )             24.3     02-    138  |  97<L>  |  77.0<H>  ----------------------------<  288<H>  5.1   |  35.0<H>  |  1.41<H>    Ca    8.3<L>      2019 08:13  Phos  2.6       Mg     2.2

## 2019-02-27 DIAGNOSIS — J90 PLEURAL EFFUSION, NOT ELSEWHERE CLASSIFIED: ICD-10-CM

## 2019-02-27 DIAGNOSIS — N17.9 ACUTE KIDNEY FAILURE, UNSPECIFIED: ICD-10-CM

## 2019-02-27 DIAGNOSIS — Z51.5 ENCOUNTER FOR PALLIATIVE CARE: ICD-10-CM

## 2019-02-27 DIAGNOSIS — I10 ESSENTIAL (PRIMARY) HYPERTENSION: ICD-10-CM

## 2019-02-27 DIAGNOSIS — I50.33 ACUTE ON CHRONIC DIASTOLIC (CONGESTIVE) HEART FAILURE: ICD-10-CM

## 2019-02-27 LAB
ANION GAP SERPL CALC-SCNC: 8 MMOL/L — SIGNIFICANT CHANGE UP (ref 5–17)
BASE EXCESS BLDA CALC-SCNC: 10.4 MMOL/L — HIGH (ref -2–2)
BUN SERPL-MCNC: 87 MG/DL — HIGH (ref 8–20)
CALCIUM SERPL-MCNC: 8.3 MG/DL — LOW (ref 8.6–10.2)
CHLORIDE SERPL-SCNC: 96 MMOL/L — LOW (ref 98–107)
CO2 SERPL-SCNC: 35 MMOL/L — HIGH (ref 22–29)
CREAT SERPL-MCNC: 1.72 MG/DL — HIGH (ref 0.5–1.3)
EOSINOPHIL # BLD AUTO: 0.1 K/UL — SIGNIFICANT CHANGE UP (ref 0–0.5)
EOSINOPHIL NFR BLD AUTO: 1.6 % — SIGNIFICANT CHANGE UP (ref 0–6)
GAS PNL BLDA: SIGNIFICANT CHANGE UP
GLUCOSE BLDC GLUCOMTR-MCNC: 209 MG/DL — HIGH (ref 70–99)
GLUCOSE BLDC GLUCOMTR-MCNC: 257 MG/DL — HIGH (ref 70–99)
GLUCOSE BLDC GLUCOMTR-MCNC: 273 MG/DL — HIGH (ref 70–99)
GLUCOSE BLDC GLUCOMTR-MCNC: 284 MG/DL — HIGH (ref 70–99)
GLUCOSE SERPL-MCNC: 188 MG/DL — HIGH (ref 70–115)
HCO3 BLDA-SCNC: 34 MMOL/L — HIGH (ref 20–26)
HCT VFR BLD CALC: 24.4 % — LOW (ref 37–47)
HGB BLD-MCNC: 8.1 G/DL — LOW (ref 12–16)
HOROWITZ INDEX BLDA+IHG-RTO: SIGNIFICANT CHANGE UP
LYMPHOCYTES # BLD AUTO: 0.5 K/UL — LOW (ref 1–4.8)
LYMPHOCYTES # BLD AUTO: 7.5 % — LOW (ref 20–55)
MAGNESIUM SERPL-MCNC: 2.6 MG/DL — SIGNIFICANT CHANGE UP (ref 1.6–2.6)
MCHC RBC-ENTMCNC: 29.8 PG — SIGNIFICANT CHANGE UP (ref 27–31)
MCHC RBC-ENTMCNC: 33.2 G/DL — SIGNIFICANT CHANGE UP (ref 32–36)
MCV RBC AUTO: 89.7 FL — SIGNIFICANT CHANGE UP (ref 81–99)
MONOCYTES # BLD AUTO: 0.5 K/UL — SIGNIFICANT CHANGE UP (ref 0–0.8)
MONOCYTES NFR BLD AUTO: 7.7 % — SIGNIFICANT CHANGE UP (ref 3–10)
NEUTROPHILS # BLD AUTO: 5.1 K/UL — SIGNIFICANT CHANGE UP (ref 1.8–8)
NEUTROPHILS NFR BLD AUTO: 82.9 % — HIGH (ref 37–73)
NT-PROBNP SERPL-SCNC: 8895 PG/ML — HIGH (ref 0–300)
PCO2 BLDA: 66 MMHG — HIGH (ref 35–45)
PH BLDA: 7.36 — SIGNIFICANT CHANGE UP (ref 7.35–7.45)
PHOSPHATE SERPL-MCNC: 2.8 MG/DL — SIGNIFICANT CHANGE UP (ref 2.4–4.7)
PLATELET # BLD AUTO: 181 K/UL — SIGNIFICANT CHANGE UP (ref 150–400)
PO2 BLDA: 76 MMHG — LOW (ref 83–108)
POTASSIUM SERPL-MCNC: 4.9 MMOL/L — SIGNIFICANT CHANGE UP (ref 3.5–5.3)
POTASSIUM SERPL-SCNC: 4.9 MMOL/L — SIGNIFICANT CHANGE UP (ref 3.5–5.3)
RBC # BLD: 2.72 M/UL — LOW (ref 4.4–5.2)
RBC # FLD: 16.4 % — HIGH (ref 11–15.6)
SAO2 % BLDA: 96 % — SIGNIFICANT CHANGE UP (ref 95–99)
SODIUM SERPL-SCNC: 139 MMOL/L — SIGNIFICANT CHANGE UP (ref 135–145)
TROPONIN T SERPL-MCNC: 0.09 NG/ML — HIGH (ref 0–0.06)
WBC # BLD: 6.1 K/UL — SIGNIFICANT CHANGE UP (ref 4.8–10.8)
WBC # FLD AUTO: 6.1 K/UL — SIGNIFICANT CHANGE UP (ref 4.8–10.8)

## 2019-02-27 PROCEDURE — 99233 SBSQ HOSP IP/OBS HIGH 50: CPT

## 2019-02-27 PROCEDURE — 99223 1ST HOSP IP/OBS HIGH 75: CPT

## 2019-02-27 PROCEDURE — 93010 ELECTROCARDIOGRAM REPORT: CPT

## 2019-02-27 PROCEDURE — 71045 X-RAY EXAM CHEST 1 VIEW: CPT | Mod: 26

## 2019-02-27 PROCEDURE — 99497 ADVNCD CARE PLAN 30 MIN: CPT

## 2019-02-27 PROCEDURE — 99232 SBSQ HOSP IP/OBS MODERATE 35: CPT

## 2019-02-27 RX ORDER — SODIUM CHLORIDE 9 MG/ML
250 INJECTION INTRAMUSCULAR; INTRAVENOUS; SUBCUTANEOUS
Qty: 0 | Refills: 0 | Status: COMPLETED | OUTPATIENT
Start: 2019-02-27 | End: 2019-02-27

## 2019-02-27 RX ORDER — HEPARIN SODIUM 5000 [USP'U]/ML
5000 INJECTION INTRAVENOUS; SUBCUTANEOUS EVERY 12 HOURS
Qty: 0 | Refills: 0 | Status: DISCONTINUED | OUTPATIENT
Start: 2019-02-27 | End: 2019-03-12

## 2019-02-27 RX ORDER — INSULIN LISPRO 100/ML
3 VIAL (ML) SUBCUTANEOUS
Qty: 0 | Refills: 0 | Status: DISCONTINUED | OUTPATIENT
Start: 2019-02-27 | End: 2019-03-01

## 2019-02-27 RX ORDER — ALBUMIN HUMAN 25 %
100 VIAL (ML) INTRAVENOUS ONCE
Qty: 0 | Refills: 0 | Status: COMPLETED | OUTPATIENT
Start: 2019-02-27 | End: 2019-02-27

## 2019-02-27 RX ADMIN — Medication 2: at 07:40

## 2019-02-27 RX ADMIN — SODIUM CHLORIDE 30 MILLILITER(S): 9 INJECTION INTRAMUSCULAR; INTRAVENOUS; SUBCUTANEOUS at 12:51

## 2019-02-27 RX ADMIN — Medication 3 MILLILITER(S): at 08:51

## 2019-02-27 RX ADMIN — Medication 3: at 16:23

## 2019-02-27 RX ADMIN — GABAPENTIN 100 MILLIGRAM(S): 400 CAPSULE ORAL at 05:18

## 2019-02-27 RX ADMIN — Medication 100 MILLIGRAM(S): at 17:30

## 2019-02-27 RX ADMIN — Medication 3 UNIT(S): at 16:24

## 2019-02-27 RX ADMIN — ATORVASTATIN CALCIUM 80 MILLIGRAM(S): 80 TABLET, FILM COATED ORAL at 20:56

## 2019-02-27 RX ADMIN — ERYTHROPOIETIN 20000 UNIT(S): 10000 INJECTION, SOLUTION INTRAVENOUS; SUBCUTANEOUS at 20:56

## 2019-02-27 RX ADMIN — MAGNESIUM OXIDE 400 MG ORAL TABLET 400 MILLIGRAM(S): 241.3 TABLET ORAL at 11:42

## 2019-02-27 RX ADMIN — ISOSORBIDE MONONITRATE 60 MILLIGRAM(S): 60 TABLET, EXTENDED RELEASE ORAL at 14:31

## 2019-02-27 RX ADMIN — Medication 100 MILLIGRAM(S): at 05:18

## 2019-02-27 RX ADMIN — GABAPENTIN 100 MILLIGRAM(S): 400 CAPSULE ORAL at 11:44

## 2019-02-27 RX ADMIN — HEPARIN SODIUM 5000 UNIT(S): 5000 INJECTION INTRAVENOUS; SUBCUTANEOUS at 17:29

## 2019-02-27 RX ADMIN — PANTOPRAZOLE SODIUM 40 MILLIGRAM(S): 20 TABLET, DELAYED RELEASE ORAL at 05:19

## 2019-02-27 RX ADMIN — Medication 40 MILLIGRAM(S): at 05:18

## 2019-02-27 RX ADMIN — AMLODIPINE BESYLATE 10 MILLIGRAM(S): 2.5 TABLET ORAL at 05:18

## 2019-02-27 RX ADMIN — Medication 3 MILLILITER(S): at 21:30

## 2019-02-27 RX ADMIN — GABAPENTIN 100 MILLIGRAM(S): 400 CAPSULE ORAL at 20:56

## 2019-02-27 RX ADMIN — CARVEDILOL PHOSPHATE 25 MILLIGRAM(S): 80 CAPSULE, EXTENDED RELEASE ORAL at 17:30

## 2019-02-27 RX ADMIN — Medication 3: at 11:39

## 2019-02-27 RX ADMIN — POLYETHYLENE GLYCOL 3350 17 GRAM(S): 17 POWDER, FOR SOLUTION ORAL at 11:43

## 2019-02-27 RX ADMIN — DULOXETINE HYDROCHLORIDE 60 MILLIGRAM(S): 30 CAPSULE, DELAYED RELEASE ORAL at 11:42

## 2019-02-27 RX ADMIN — SENNA PLUS 2 TABLET(S): 8.6 TABLET ORAL at 20:57

## 2019-02-27 RX ADMIN — Medication 3 UNIT(S): at 11:40

## 2019-02-27 RX ADMIN — Medication 3 MILLILITER(S): at 15:27

## 2019-02-27 RX ADMIN — Medication 50 MILLILITER(S): at 15:02

## 2019-02-27 RX ADMIN — Medication 81 MILLIGRAM(S): at 11:42

## 2019-02-27 RX ADMIN — Medication 100 MILLIGRAM(S): at 07:44

## 2019-02-27 RX ADMIN — INSULIN GLARGINE 10 UNIT(S): 100 INJECTION, SOLUTION SUBCUTANEOUS at 21:14

## 2019-02-27 RX ADMIN — CARVEDILOL PHOSPHATE 25 MILLIGRAM(S): 80 CAPSULE, EXTENDED RELEASE ORAL at 05:18

## 2019-02-27 NOTE — CONSULT NOTE ADULT - SUBJECTIVE AND OBJECTIVE BOX
Palliative Medicine Initial Consultation Note    HPI:  History from chart- patient poor historian  Pt is a 64 yo presenting to ED w/ SOB, PMH CAD s/p CABG and PCI, PAD s/p balloon angioplasty, DM2, HTN, HLD, hx of cardiac arrest, recent GI bleed last month, Diastolic CHF, legally blind, hx  pleural effusion requiring thoracentesis in Dec 2018.   Patient states that she has been having increasing SOB over the last week, she lives at home with her family, denies sick contacts, but has been having more exertional dyspnea. She states she walks with a walker but it is taking less activity for her dyspnea to occur. She has been compliant with all of her medications, she does not recall the names, but states that there have been no changes to her medication regimen since her discharge. She denies any associated chest pain or pressure. She has been taking her tosemide she believes. Denies PND or orthopnea, but she has noted increased LE edema.   Denies headaches, nausea, vomiting, chest pain, palpitations, abdominal pain, constipation, diarrhea, melena, hematochezia, dysuria.    PERTINENT PMH REVIEWED:  [ x] YES [ ] NO          Past Medical History:  Diabetes    Glaucoma    Herniated disc, cervical    History of CEA (carotid endarterectomy)  Right  History of peripheral vascular disease    History of retinal detachment    Hyperlipidemia    Hypertension    Legally blind    PAD (peripheral artery disease).     Past Surgical History:  After cataract    Atherosclerosis of right carotid artery     delivery delivered    Detached retina    S/P CABG x 1    Uveitic glaucoma of both eyes.  SOCIAL HISTORY:  EtOH [ ] Yes  [x ] No                                    Drugs [ ] Yes [ x] No                                   [ ] smoker [ x] nonsmoker                                    Admitted from: [ x] home [ ] SNF _________ [ ] [facility] ________    Surrogate/HCP/Guardian:   surrogate  FAMILY HISTORY:  unable to obtain-  poor MS      Baseline ADLs (prior to admission):  unknown - poor historian  Independent [ ] moderately [ ] fully   Dependent   [ ] moderately [ ]fully    MEDICATIONS  (STANDING):  albumin human 25% IVPB 100 milliLiter(s) IV Intermittent once  ALBUTerol    0.083% 10 milliGRAM(s) Nebulizer once  ALBUTerol/ipratropium for Nebulization 3 milliLiter(s) Nebulizer every 6 hours  amLODIPine   Tablet 10 milliGRAM(s) Oral daily  aspirin enteric coated 81 milliGRAM(s) Oral daily  atorvastatin 80 milliGRAM(s) Oral at bedtime  carvedilol 25 milliGRAM(s) Oral every 12 hours  dextrose 5%. 1000 milliLiter(s) (50 mL/Hr) IV Continuous <Continuous>  dextrose 50% Injectable 12.5 Gram(s) IV Push once  dextrose 50% Injectable 25 Gram(s) IV Push once  dextrose 50% Injectable 25 Gram(s) IV Push once  docusate sodium 100 milliGRAM(s) Oral two times a day  DULoxetine 60 milliGRAM(s) Oral daily  epoetin sara Injectable 41499 Unit(s) SubCutaneous <User Schedule>  gabapentin 100 milliGRAM(s) Oral three times a day  heparin  Injectable 5000 Unit(s) SubCutaneous every 12 hours  insulin glargine Injectable (LANTUS) 10 Unit(s) SubCutaneous at bedtime  insulin lispro (HumaLOG) corrective regimen sliding scale   SubCutaneous three times a day before meals  insulin lispro Injectable (HumaLOG) 3 Unit(s) SubCutaneous three times a day with meals  isosorbide   mononitrate ER Tablet (IMDUR) 60 milliGRAM(s) Oral daily  magnesium oxide 400 milliGRAM(s) Oral daily  pantoprazole    Tablet 40 milliGRAM(s) Oral before breakfast  polyethylene glycol 3350 17 Gram(s) Oral daily  predniSONE   Tablet 40 milliGRAM(s) Oral daily  predniSONE   Tablet   Oral   senna 2 Tablet(s) Oral at bedtime  sodium chloride 0.9%. 250 milliLiter(s) (30 mL/Hr) IV Continuous <Continuous>    MEDICATIONS  (PRN):  acetaminophen   Tablet .. 650 milliGRAM(s) Oral every 6 hours PRN Mild Pain (1 - 3)  dextrose 40% Gel 15 Gram(s) Oral once PRN Blood Glucose LESS THAN 70 milliGRAM(s)/deciliter  glucagon  Injectable 1 milliGRAM(s) IntraMuscular once PRN Glucose LESS THAN 70 milligrams/deciliter  guaiFENesin    Syrup 100 milliGRAM(s) Oral every 6 hours PRN Cough  ondansetron Injectable 4 milliGRAM(s) IV Push every 6 hours PRN Nausea and/or Vomiting      Allergies    Accupril (Other)    Intolerances        REVIEW OF SYSTEMS     See HPI-      [ x] Unable to obtain due to poor historian      Karnofsky Performance Score/Palliative Performance Status Version 2:  30 %    Vital Signs Last 24 Hrs  T(C): 36.8 (2019 07:59), Max: 37.1 (2019 23:48)  T(F): 98.2 (2019 07:59), Max: 98.8 (2019 23:48)  HR: 58 (2019 09:15) (58 - 68)  BP: 130/74 (2019 07:59) (128/71 - 134/76)  BP(mean): --  RR: 19 (2019 07:59) (18 - 19)  SpO2: 95% (2019 09:15) (95% - 95%)    PHYSICAL EXAM:    General: Obese woman, NAD, awake oriented x3   HEENT: [ x] normal  [ ] dry mouth  [ ] ET tube/trach    Lungs: [x ] comfortable -nasal canula  CV: [x ] normal  [ ] tachycardia    GI: [ x] normal  [ ] distended  [ ] tender  [ ] no BS               [ ] PEG/NG/OG tube    : [x ] normal  [ ] incontinent  [ ] oliguria/anuria  [ ] fontenot    MSK: [ ] normal  [ x] weakness  [ ] edema             [ ] ambulatory  [ ] bedbound/wheelchair bound    Skin: [ ] normal  [ ] pressure ulcers- Stage_____  [x ] no rash    Neuro awake, oriented x3, - not engaging in conversation   answers "yes"  "no"     LABS:                        8.1    6.1   )-----------( 181      ( 2019 08:40 )             24.4         139  |  96<L>  |  87.0<H>  ----------------------------<  188<H>  4.9   |  35.0<H>  |  1.72<H>    Ca    8.3<L>      2019 08:40  Phos  2.8       Mg     2.6               I&O's Summary    2019 07:01  -  2019 07:00  --------------------------------------------------------  IN: 0 mL / OUT: 400 mL / NET: -400 mL        RADIOLOGY & ADDITIONAL STUDIES:    < from: CT Chest No Cont (19 @ 11:39) >  IMPRESSION:     Moderate to large right and small left pleural effusions with almost   complete atelectasis of the right lower lobe secondary to the pleural   effusion; right pleural effusion has increased in sizeand left pleural   effusion is new since 2019.    Patchy groundglass opacities in the left lower lobe, either   infectious/inflammatory or secondary to pulmonary edema.    Additional ground glass attenuation in the lungs, likely pulmonary edema.    Diffuse anasarca.    Small amount of ascites within the imaged upper abdomen.              < end of copied text >      < from: TTE Echo Complete w/Doppler (02.15.19 @ 15:29) >    Summary:   1. Technically suboptimal study.   2. Left ventricular ejection fraction, by visual estimation, is >75%.   3. Hyperdynamic global left ventricular systolic function.   4. There is mild concentric left ventricular hypertrophy.   5. Mild mitral annular calcification.   6. Moderate mitral valve regurgitation.   7. Thickening and calcification of the anterior and posterior mitral   valve leaflets.   8. Moderate-severe tricuspid regurgitation.   9. Estimated pulmonary artery systolic pressure is 55.7 mmHg assuming a   right atrial pressure of 5 mmHg, which is consistent with moderate   pulmonary hypertension.  10. Endocardial visualization was enhanced with intravenous echo contrast.      < end of copied text >      ADVANCE DIRECTIVES:  [ ] YES [x ] NO   DNR [ ] YES [x ] NO  Completed on:                     MOLST  [ ] YES x[ ] NO   Completed on:  Living Will  [ ] YES [ x] NO   Completed on:    Thank you for the opportunity to assist with the care of this patient.   [facility] Consult [facility] [phone].

## 2019-02-27 NOTE — PROGRESS NOTE ADULT - SUBJECTIVE AND OBJECTIVE BOX
CHIEF COMPLAINT/INTERVAL HISTORY:    Patient is a 63y old  Female who presents with a chief complaint of SOB (27 Feb 2019 09:44)      HPI:  Pt is a 64 yo presenting to ED w/ SOB, PMH CAD s/p CABG and PCI, PAD s/p baloon angioplasty, DM2, HTN, HLD, hx of cardiac arrest, recent GI bleed last month, Diastolic CHF, legally blind, pleural effusion requiring thoracentesis in Dec 2018.   Patient states that she has been having increasing SOB over the last week, she lives at home with her family, denies sick contacts, but has been having more exertional dyspnea. She states she walks with a walker but it is taking less activity for her dyspnea to occur. She has been compliant with all of her medications, she does not recall the names, but states that there have been no changes to her medication regimen since her discharge. She denies any associated chest pain or pressure. She has been taking her tosemide she believes. Denies PND or orthopnea, but she has noted increased LE edema.   Denies headaches, nausea, vomiting, chest pain, palpitations, abdominal pain, constipation, diarrhea, melena, hematochezia, dysuria.   Her SOB has since resolved. She is breathing comfortably, has no complaints.  In ED patient was noted to have hyperkalemia on repeat labs, troponin elevated at 0.09. Her creatinine is also elevated. She did not receive any potassium treatment initially so I ordered Calcium gluc, kayexelate, Insulin, dextrose, Albuterol. EKG without widened QRS or peaked T waves. (11 Feb 2019 03:58)      SUBJECTIVE & OBJECTIVE/ ROS: Pt seen and examined at bedside. Pt yet on 3L NC (does not user O2 at home) with AVAPS at night. Pt feels her breathing is "better". Appears lethargic today with clinical worsening of her resp status.      ICU Vital Signs Last 24 Hrs  T(C): 36.8 (27 Feb 2019 07:59), Max: 37.1 (26 Feb 2019 23:48)  T(F): 98.2 (27 Feb 2019 07:59), Max: 98.8 (26 Feb 2019 23:48)  HR: 58 (27 Feb 2019 09:15) (58 - 68)  BP: 130/74 (27 Feb 2019 07:59) (128/71 - 134/76)  BP(mean): --  ABP: --  ABP(mean): --  RR: 19 (27 Feb 2019 07:59) (18 - 19)  SpO2: 95% (27 Feb 2019 09:15) (95% - 95%)        MEDICATIONS  (STANDING):  ALBUTerol    0.083% 10 milliGRAM(s) Nebulizer once  ALBUTerol/ipratropium for Nebulization 3 milliLiter(s) Nebulizer every 6 hours  amLODIPine   Tablet 10 milliGRAM(s) Oral daily  aspirin enteric coated 81 milliGRAM(s) Oral daily  atorvastatin 80 milliGRAM(s) Oral at bedtime  carvedilol 25 milliGRAM(s) Oral every 12 hours  dextrose 5%. 1000 milliLiter(s) (50 mL/Hr) IV Continuous <Continuous>  dextrose 50% Injectable 12.5 Gram(s) IV Push once  dextrose 50% Injectable 25 Gram(s) IV Push once  dextrose 50% Injectable 25 Gram(s) IV Push once  docusate sodium 100 milliGRAM(s) Oral two times a day  DULoxetine 60 milliGRAM(s) Oral daily  epoetin sara Injectable 50755 Unit(s) SubCutaneous <User Schedule>  gabapentin 100 milliGRAM(s) Oral three times a day  heparin  Injectable 5000 Unit(s) SubCutaneous every 12 hours  insulin glargine Injectable (LANTUS) 10 Unit(s) SubCutaneous at bedtime  insulin lispro (HumaLOG) corrective regimen sliding scale   SubCutaneous three times a day before meals  insulin lispro Injectable (HumaLOG) 3 Unit(s) SubCutaneous three times a day with meals  isosorbide   mononitrate ER Tablet (IMDUR) 60 milliGRAM(s) Oral daily  magnesium oxide 400 milliGRAM(s) Oral daily  pantoprazole    Tablet 40 milliGRAM(s) Oral before breakfast  polyethylene glycol 3350 17 Gram(s) Oral daily  predniSONE   Tablet 40 milliGRAM(s) Oral daily  predniSONE   Tablet   Oral   senna 2 Tablet(s) Oral at bedtime  sodium chloride 0.9%. 250 milliLiter(s) (30 mL/Hr) IV Continuous <Continuous>    MEDICATIONS  (PRN):  acetaminophen   Tablet .. 650 milliGRAM(s) Oral every 6 hours PRN Mild Pain (1 - 3)  dextrose 40% Gel 15 Gram(s) Oral once PRN Blood Glucose LESS THAN 70 milliGRAM(s)/deciliter  glucagon  Injectable 1 milliGRAM(s) IntraMuscular once PRN Glucose LESS THAN 70 milligrams/deciliter  guaiFENesin    Syrup 100 milliGRAM(s) Oral every 6 hours PRN Cough  ondansetron Injectable 4 milliGRAM(s) IV Push every 6 hours PRN Nausea and/or Vomiting      LABS:                        8.1    6.1   )-----------( 181      ( 27 Feb 2019 08:40 )             24.4     02-27    139  |  96<L>  |  87.0<H>  ----------------------------<  188<H>  4.9   |  35.0<H>  |  1.72<H>    Ca    8.3<L>      27 Feb 2019 08:40  Phos  2.8     02-27  Mg     2.6     02-27            CAPILLARY BLOOD GLUCOSE      POCT Blood Glucose.: 209 mg/dL (27 Feb 2019 07:32)  POCT Blood Glucose.: 338 mg/dL (26 Feb 2019 16:05)  POCT Blood Glucose.: 303 mg/dL (26 Feb 2019 11:17)      RECENT CULTURES:      RADIOLOGY & ADDITIONAL TESTS:      PHYSICAL EXAM:    GENERAL: mild resp distress, 3L NC, , morbidly obese  HEAD:  Atraumatic, Normocephalic  EYES: EOMI,  Opaque right cornea.  ENMT: Moist mucous membranes  NECK: Supple, No JVD  NERVOUS SYSTEM:  Alert & Oriented X3, Motor Strength 5/5 B/L upper and lower extremities; DTRs 2+ intact and symmetric  CHEST/LUNG: decreased BS b/l; No rales, rhonchi, wheezing, or rubs  HEART: Regular rate and rhythm; No murmurs, rubs, or gallops  Ext: 2+ pedal edema. No calf tenderness. 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 Dry scab on right heel. Old healed scars on left leg. Swelling in LUE due to IV infiltration. Neurovascular intact.   Neuro: slightly lethargic but arousable AAO x 3 when awake. No focal deficit.   Skin: Warm and Dry  Psychiatry: Normal mood and affect

## 2019-02-27 NOTE — PROGRESS NOTE ADULT - SUBJECTIVE AND OBJECTIVE BOX
NEPHROLOGY INTERVAL HPI/OVERNIGHT EVENTS:    Interim noted   Meds reviewed   balance  (-) 14   off Cozaar   + prednisone     MEDICATIONS  (STANDING):  albumin human 25% IVPB 100 milliLiter(s) IV Intermittent once  ALBUTerol    0.083% 10 milliGRAM(s) Nebulizer once  ALBUTerol/ipratropium for Nebulization 3 milliLiter(s) Nebulizer every 6 hours  amLODIPine   Tablet 10 milliGRAM(s) Oral daily  aspirin enteric coated 81 milliGRAM(s) Oral daily  atorvastatin 80 milliGRAM(s) Oral at bedtime  carvedilol 25 milliGRAM(s) Oral every 12 hours  dextrose 5%. 1000 milliLiter(s) (50 mL/Hr) IV Continuous <Continuous>  dextrose 50% Injectable 12.5 Gram(s) IV Push once  dextrose 50% Injectable 25 Gram(s) IV Push once  dextrose 50% Injectable 25 Gram(s) IV Push once  docusate sodium 100 milliGRAM(s) Oral two times a day  DULoxetine 60 milliGRAM(s) Oral daily  epoetin sara Injectable 66096 Unit(s) SubCutaneous <User Schedule>  gabapentin 100 milliGRAM(s) Oral three times a day  heparin  Injectable 5000 Unit(s) SubCutaneous every 12 hours  insulin glargine Injectable (LANTUS) 10 Unit(s) SubCutaneous at bedtime  insulin lispro (HumaLOG) corrective regimen sliding scale   SubCutaneous three times a day before meals  insulin lispro Injectable (HumaLOG) 3 Unit(s) SubCutaneous three times a day with meals  isosorbide   mononitrate ER Tablet (IMDUR) 60 milliGRAM(s) Oral daily  magnesium oxide 400 milliGRAM(s) Oral daily  pantoprazole    Tablet 40 milliGRAM(s) Oral before breakfast  polyethylene glycol 3350 17 Gram(s) Oral daily  predniSONE   Tablet 40 milliGRAM(s) Oral daily  predniSONE   Tablet   Oral   senna 2 Tablet(s) Oral at bedtime    MEDICATIONS  (PRN):  acetaminophen   Tablet .. 650 milliGRAM(s) Oral every 6 hours PRN Mild Pain (1 - 3)  dextrose 40% Gel 15 Gram(s) Oral once PRN Blood Glucose LESS THAN 70 milliGRAM(s)/deciliter  glucagon  Injectable 1 milliGRAM(s) IntraMuscular once PRN Glucose LESS THAN 70 milligrams/deciliter  guaiFENesin    Syrup 100 milliGRAM(s) Oral every 6 hours PRN Cough  ondansetron Injectable 4 milliGRAM(s) IV Push every 6 hours PRN Nausea and/or Vomiting      Allergies    Accupril (Other)    Intolerances            Vital Signs Last 24 Hrs  T(C): 36.8 (27 Feb 2019 07:59), Max: 37.1 (26 Feb 2019 23:48)  T(F): 98.2 (27 Feb 2019 07:59), Max: 98.8 (26 Feb 2019 23:48)  HR: 58 (27 Feb 2019 09:15) (58 - 68)  BP: 130/74 (27 Feb 2019 07:59) (128/71 - 134/76)  BP(mean): --  RR: 19 (27 Feb 2019 07:59) (18 - 19)  SpO2: 95% (27 Feb 2019 09:15) (95% - 95%)  Daily     Daily   I&O's Detail    26 Feb 2019 07:01  -  27 Feb 2019 07:00  --------------------------------------------------------  IN:  Total IN: 0 mL    OUT:    Voided: 400 mL  Total OUT: 400 mL    Total NET: -400 mL        I&O's Summary    26 Feb 2019 07:01  -  27 Feb 2019 07:00  --------------------------------------------------------  IN: 0 mL / OUT: 400 mL / NET: -400 mL        PHYSICAL EXAM:  Cardiovascular: Normal S1 S2, No rub  Respiratory: Clear lungs with decreased basilar BS 	  Neuro: A & O x 3; non focal  Gastrointestinal:  Soft, Non-tender, + BS	  Extremities: ++ edema     LABS:                        8.1    6.1   )-----------( 181      ( 27 Feb 2019 08:40 )             24.4     02-27    139  |  96<L>  |  87.0<H>  ----------------------------<  188<H>  4.9   |  35.0<H>  |  1.72<H>    Ca    8.3<L>      27 Feb 2019 08:40  Phos  2.8     02-27  Mg     2.6     02-27      INTERPRETATION:  CLINICAL INFORMATION: Indeterminate chest x-ray.    COMPARISON: Chest x-ray of the same day and CT abdomen/pelvis 1/13/2019    PROCEDURE:   CT of the Chest was performed without intravenous contrast.  Sagittal and coronal reformats were performed.   The examination is mildly degraded by streak artifact secondary to the   patient's body habitus.    FINDINGS:    CHEST:     LUNGS AND LARGE AIRWAYS: Patent central airways. Almost complete   atelectasis of the right lower lobe secondary to a pleural effusion.   Linear atelectasis or scarring is seen in the left lower lobe. There are   patchy ground glass opacities in the left lower lobe, likely either  infectious infectious/inflammatory in etiology or secondary to edema.   There is additional underlying groundglass attenuation within the   remainder of the lungs which is likely secondary to pulmonary edema.  PLEURA: Moderate to large right and small left pleural effusions.  VESSELS: Thoracic aorta normal in caliber with mild calcified plaque.   There are coronary artery calcifications.  HEART: Enlarged. No pericardial effusion.  MEDIASTINUM AND YAMILEX: Limited secondary to artifact. No enlargedlymph   nodes are seen.  CHEST WALL AND LOWER NECK: There is diffuse anasarca. No enlarged   axillary lymph nodes.  VISUALIZED UPPER ABDOMEN: Small amount of upper abdominal ascites within   the imaged upper abdomen, greatest in the perihepatic region.  BONES: Status post sternotomy.    IMPRESSION:     Moderate to large right and small left pleural effusions with almost   complete atelectasis of the right lower lobe secondary to the pleural   effusion; right pleural effusion has increased in sizeand left pleural   effusion is new since 1/13/2019.    Patchy groundglass opacities in the left lower lobe, either   infectious/inflammatory or secondary to pulmonary edema.    Additional ground glass attenuation in the lungs, likely pulmonary edema.    Diffuse anasarca.    Small amount of ascites within the imaged upper abdomen.                  SAPNA GRACE   This document has been electronically signed. Feb 26 2019  1:34PM            Magnesium, Serum: 2.6 mg/dL (02-27 @ 08:40)  Phosphorus Level, Serum: 2.8 mg/dL (02-27 @ 08:40)    ABG - ( 27 Feb 2019 12:01 )  pH, Arterial: 7.36  pH, Blood: x     /  pCO2: 66    /  pO2: 76    / HCO3: 34    / Base Excess: 10.4  /  SaO2: 96                 PHYSICIAN INTERPRETATION:  Left Ventricle: Endocardial visualization was enhanced with intravenous   echo contrast. The left ventricular internal cavity size is normal.  Global LV systolic function was hyperdynamic. Left ventricular ejection   fraction, by visual estimation, is >75%.  Mitral Valve: Thickening and calcification of the anterior and posterior   mitral valve leaflets. There is mild mitral annular calcification.   Moderate mitral valve regurgitation is seen.  Tricuspid Valve: The tricuspid valve is normal. Moderate-severe tricuspid   regurgitation is visualized. Estimated pulmonary artery systolic pressure   is 55.7 mmHg assuming a right atrial pressure of 5 mmHg, which is   consistent with moderate pulmonary hypertension. Unable to obtain   subcostal views.       Summary:   1. Technically suboptimal study.   2. Left ventricular ejection fraction, by visual estimation, is >75%.   3. Hyperdynamic global left ventricular systolic function.   4. There is mild concentric left ventricular hypertrophy.   5. Mild mitral annular calcification.   6. Moderate mitral valve regurgitation.   7. Thickening and calcification of the anterior and posterior mitral   valve leaflets.   8. Moderate-severe tricuspid regurgitation.   9. Estimated pulmonary artery systolic pressure is 55.7 mmHg assuming a   right atrial pressure of 5 mmHg, which is consistent with moderate   pulmonary hypertension.  10. Endocardial visualization was enhanced with intravenous echo contrast.    [id number] Andrew Torres MD, Electronically signed on 2/15/2019 at 6:14:21 PM              *** Final ***            RADIOLOGY & ADDITIONAL TESTS:

## 2019-02-27 NOTE — CONSULT NOTE ADULT - SUBJECTIVE AND OBJECTIVE BOX
Surgeon: Kena    Consult requesting by: Ivonne    HISTORY OF PRESENT ILLNESS:  63y Female with PMH PAD, legally blind right eye, PVD, Right CEA, HTN, HLD, DM, CAD s/p CABG, and glaucoma.  Presented to the ER / c/o SOB and LE edema.  Hyperkalemia on admission, now stable.  Pt also was found to be anemic with rectal bleeding.  Colonoscopy done and no active bleeding noted.  + Diverticulosis.  No further GI workup warranted per GI.  She has persistent oxygen requirement and desaturated this AM to the point where a rapid response was almost called.  CT chest lastnight reveals persistent pleural effusions.  Diuresis attempted per primary team, but creatinine is now elevated so diuresis was stopped.   She has history of thoracentesis in 2018 per medical record.  Pt has anasarca. Pt is  DNR/DNI per medical record.     Upon examination, pt is lying in bed on 3 liters nasal O2.  Lethargic, but arousable to verbal command.  Answers "yes" and "no" but seems disoriented/ a poor historian.  No family at bedside.  Reports that her breathing is "better".  NAD noted.    PAST MEDICAL & SURGICAL HISTORY:  Herniated disc, cervical  PAD (peripheral artery disease)  Legally blind  History of peripheral vascular disease  History of retinal detachment  History of CEA (carotid endarterectomy): Right  Hyperlipidemia  Glaucoma  Hypertension  Diabetes  S/P CABG x 1  After cataract  Uveitic glaucoma of both eyes  Detached retina  Atherosclerosis of right carotid artery   delivery delivered      MEDICATIONS  (STANDING):  ALBUTerol    0.083% 10 milliGRAM(s) Nebulizer once  ALBUTerol/ipratropium for Nebulization 3 milliLiter(s) Nebulizer every 6 hours  amLODIPine   Tablet 10 milliGRAM(s) Oral daily  aspirin enteric coated 81 milliGRAM(s) Oral daily  atorvastatin 80 milliGRAM(s) Oral at bedtime  carvedilol 25 milliGRAM(s) Oral every 12 hours  dextrose 5%. 1000 milliLiter(s) (50 mL/Hr) IV Continuous <Continuous>  dextrose 50% Injectable 12.5 Gram(s) IV Push once  dextrose 50% Injectable 25 Gram(s) IV Push once  dextrose 50% Injectable 25 Gram(s) IV Push once  docusate sodium 100 milliGRAM(s) Oral two times a day  DULoxetine 60 milliGRAM(s) Oral daily  epoetin sara Injectable 19549 Unit(s) SubCutaneous <User Schedule>  gabapentin 100 milliGRAM(s) Oral three times a day  heparin  Injectable 5000 Unit(s) SubCutaneous every 12 hours  insulin glargine Injectable (LANTUS) 10 Unit(s) SubCutaneous at bedtime  insulin lispro (HumaLOG) corrective regimen sliding scale   SubCutaneous three times a day before meals  insulin lispro Injectable (HumaLOG) 3 Unit(s) SubCutaneous three times a day with meals  isosorbide   mononitrate ER Tablet (IMDUR) 60 milliGRAM(s) Oral daily  magnesium oxide 400 milliGRAM(s) Oral daily  pantoprazole    Tablet 40 milliGRAM(s) Oral before breakfast  polyethylene glycol 3350 17 Gram(s) Oral daily  predniSONE   Tablet 40 milliGRAM(s) Oral daily  predniSONE   Tablet   Oral   senna 2 Tablet(s) Oral at bedtime  sodium chloride 0.9%. 250 milliLiter(s) (30 mL/Hr) IV Continuous <Continuous>    MEDICATIONS  (PRN):  acetaminophen   Tablet .. 650 milliGRAM(s) Oral every 6 hours PRN Mild Pain (1 - 3)  dextrose 40% Gel 15 Gram(s) Oral once PRN Blood Glucose LESS THAN 70 milliGRAM(s)/deciliter  glucagon  Injectable 1 milliGRAM(s) IntraMuscular once PRN Glucose LESS THAN 70 milligrams/deciliter  guaiFENesin    Syrup 100 milliGRAM(s) Oral every 6 hours PRN Cough  ondansetron Injectable 4 milliGRAM(s) IV Push every 6 hours PRN Nausea and/or Vomiting    Antiplatelet therapy:                           Last dose/amt:    Allergies    Accupril (Other)    Intolerances        SOCIAL HISTORY:  Smoker: [ ] Yes  [x ] No        PACK YEARS:                         WHEN QUIT?  ETOH use: [ ] Yes  [x ] No              FREQUENCY / QUANTITY:  Ilicit Drug use:  [ ] Yes  [x ] No    Uses a cane per medical record       FAMILY HISTORY:      Review of Systems     Pt is a poor historian> information mostly obtained from medical record.  CONSTITUTIONAL:  Fevers[ ] chills[ ] sweats[ ] fatigue[ ] weight loss[ ] weight gain [x ]                                     NEGATIVE [ ]   NEURO:  parathesias[ ] seizures [ ]  syncope [ ]  confusion [x ]                                                                                NEGATIVE[ ]   EYES: glasses[ ]  blurry vision[ ]  discharge[ ] pain[ ] glaucoma [ ]   Right eye blindness.                                        NEGATIVE[ ]   ENMT:  difficulty hearing [ ]  vertigo[ ]  dysphagia[ ] epistaxis[ ] recent dental work [ ]                                    NEGATIVE[x ]   CV:  chest pain[ ] palpitations[ ] BOONE [ ] diaphoresis [ ]                                                                                           NEGATIVE[x ]   RESPIRATORY:  wheezing[ ] SOB[x ] cough [ ] sputum[ ] hemoptysis[ ]                                                                  NEGATIVE[ ]   GI:  nausea[ ]  vomiting [ ]  diarrhea[ ] constipation [ ] melena [ ]                                                                      NEGATIVE[ x]   : hematuria[ ]  dysuria[ ] urgency[ ] incontinence[ ]                                                                                            NEGATIVE[x ]   MUSKULOSKELETAL:  arthritis[ ]  joint swelling [ ] muscle weakness [ ]                                                                NEGATIVE[x ]   SKIN/BREAST:  rash[ ] itching [ ]  hair loss[ ] masses[ ]                                                                                              NEGATIVE[x ]   PSYCH:  dementia [ ] depresion [ ] anxiety[ ]                                                                                                               NEGATIVE[x ]   HEME/LYMPH:  bruises easily[ ] enlarged lymph nodes[ ] tender lymph nodes[ ]                                               NEGATIVE[x ]   ENDOCRINE:  cold intolerance[ ] heat intolerance[ ] polydipsia[ ]                                                                          NEGATIVE[x ]     PHYSICAL EXAM  Vital Signs Last 24 Hrs  T(C): 36.8 (2019 07:59), Max: 37.1 (2019 23:48)  T(F): 98.2 (2019 07:59), Max: 98.8 (2019 23:48)  HR: 58 (2019 09:15) (58 - 68)  BP: 130/74 (2019 07:59) (128/71 - 134/76)  BP(mean): --  RR: 19 (2019 07:59) (18 - 19)  SpO2: 95% (2019 09:15) (95% - 95%)    CONSTITUTIONAL:                                                                           Neuro: WNL[ ] Normal exam oriented to person/place & time with no focal motor or sensory  deficits. Other  Lethargic but arousable to verbal command.  Falls back to sleep easily.  Oriented to person.  Answers "yes" and "no" but seems to be disoriented.                   Eyes: WNL[ ]   Normal exam of conjunctiva & lids, pupils equally reactive. Other   Right eye blindness  ENT: WNL[x ]    Normal exam of nasal/oral mucosa with absence of cyanosis. Other  Neck: WNL[x ]  Normal exam of jugular veins, trachea & thyroid. Other  Chest: WNL[ ] Normal lung exam with good air movement absence of wheezes, rales, or rhonchi: Other   Scar of OHS noted.  Diminished BLL                                                                             CV:  Auscultation: normal [x ] S3[ ] S4[ ] Irregular [ ] Rub[ ] Clicks[ ]    Murmurs none:[ x]systolic [ ]  diastolic [ ] holosystolic [ ]  Carotids: No Bruits[x ] Other                                                                                               GI:           WNL[ ] Normal exam of abdomen, liver & spleen with no noted masses or tenderness. Other       Obese/softly distended/NT.  +BS.                                                                                                  Extremities: WNL[ ] Normal no evidence of cyanosis or deformity Edema: none[ ]trace[ ]1+[ ]2+[ ]3+[ ]4+[ ]  Lower Extremity Pulses: Right[ ] Left[ ]Varicosities[ ]  Anasarca.  SKIN :WNL[ ] Normal exam to inspection & palation. Other:  Dry                                                          LABS:                        8.1    6.1   )-----------( 181      ( 2019 08:40 )             24.4         139  |  96<L>  |  87.0<H>  ----------------------------<  188<H>  4.9   |  35.0<H>  |  1.72<H>    Ca    8.3<L>      2019 08:40  Phos  2.8       Mg     2.6               < from: Xray Chest 1 View- PORTABLE-Routine (19 @ 05:38) >  EXAM:  XR CHEST PORTABLE ROUTINE 1V                          PROCEDURE DATE:  2019      INTERPRETATION:  CLINICAL STATEMENT: Follow-up chest pain.    TECHNIQUE: AP view of the chest.    COMPARISON: 2019    FINDINGS/  IMPRESSION:  Sternotomy wires again noted. Mild increased interstitial lung markings   without significant change. Opacity right lung which may represent right   pleural effusion and/or airspace opacity without significant change given   differences in technique. Nonspecific lucencies overlies the right chest.   Repeat chest x-ray recommended.    Heart size cannot be accurately assessed in this projection, but appear   enlarged.    BRANDYN WAGGONER M.D., ATTENDING RADIOLOGIST  This document has been electronically signed. 2019  7:11AM    < end of copied text >    < from: TTE Echo Complete w/Doppler (02.15.19 @ 15:29) >  Summary:   1. Technically suboptimal study.   2. Left ventricular ejection fraction, by visual estimation, is >75%.   3. Hyperdynamic global left ventricular systolic function.   4. There is mild concentric left ventricular hypertrophy.   5. Mild mitral annular calcification.   6. Moderate mitral valve regurgitation.   7. Thickening and calcification of the anterior and posterior mitral   valve leaflets.   8. Moderate-severe tricuspid regurgitation.   9. Estimated pulmonary artery systolic pressure is 55.7 mmHg assuming a   right atrial pressure of 5 mmHg, which is consistent with moderate   pulmonary hypertension.  10. Endocardial visualization was enhanced with intravenous echo contrast.    [id number] Mahendra Grey MD, Electronically signed on 2/15/2019 at 6:14:21 PM         *** Final ***    MAHENDRA GREY   This document has beenelectronically signed. Feb 15 2019  3:29PM          < end of copied text >          < from: CT Chest No Cont (19 @ 11:39) >  IMPRESSION:     Moderate to large right and small left pleural effusions with almost   complete atelectasis of the right lower lobe secondary to the pleural   effusion; right pleural effusion has increased in sizeand left pleural   effusion is new since 2019.    Patchy groundglass opacities in the left lower lobe, either   infectious/inflammatory or secondary to pulmonary edema.    Additional ground glass attenuation in the lungs, likely pulmonary edema.    Diffuse anasarca.    Small amount of ascites within the imaged upper abdomen.      SAPNA GRACE   This document has been electronically signed. 2019  1:34PM    < end of copied text >

## 2019-02-27 NOTE — CONSULT NOTE ADULT - ASSESSMENT
63 year old female with PMh as above.  Presented with SOB and LE edema with hyperkalemia and anemia.  Now with persistent pleural effusions and oxygen requirement.  + KEITH.  Diuretics being held at this time.  CTS consulted for possible pigtail placement.

## 2019-02-27 NOTE — PROGRESS NOTE ADULT - SUBJECTIVE AND OBJECTIVE BOX
[facility]                                                        Office: [address] [address]                                                       Telephone: [phone]. Fax: [phone]      CC: I have chest pain when I cough.     INTERVAL HISTORY: Patient with CT chest yesterday that showed moderate to large right pleural effusion, new small left pleural effusion, pulmonary edema, diffuse anasarca, and ascites. Patient desaturated this morning after oxygen was removed and almost required a rapid response. Patient states her breathing is actually better today. BUN/Cr uptrending.     MEDICATIONS  (STANDING):  ALBUTerol    0.083% 10 milliGRAM(s) Nebulizer once  ALBUTerol/ipratropium for Nebulization 3 milliLiter(s) Nebulizer every 6 hours  amLODIPine   Tablet 10 milliGRAM(s) Oral daily  aspirin enteric coated 81 milliGRAM(s) Oral daily  atorvastatin 80 milliGRAM(s) Oral at bedtime  carvedilol 25 milliGRAM(s) Oral every 12 hours  dextrose 5%. 1000 milliLiter(s) (50 mL/Hr) IV Continuous <Continuous>  dextrose 50% Injectable 12.5 Gram(s) IV Push once  dextrose 50% Injectable 25 Gram(s) IV Push once  dextrose 50% Injectable 25 Gram(s) IV Push once  docusate sodium 100 milliGRAM(s) Oral two times a day  DULoxetine 60 milliGRAM(s) Oral daily  epoetin sara Injectable 25832 Unit(s) SubCutaneous <User Schedule>  gabapentin 100 milliGRAM(s) Oral three times a day  insulin glargine Injectable (LANTUS) 10 Unit(s) SubCutaneous at bedtime  insulin lispro (HumaLOG) corrective regimen sliding scale   SubCutaneous three times a day before meals  insulin lispro Injectable (HumaLOG) 3 Unit(s) SubCutaneous three times a day with meals  isosorbide   mononitrate ER Tablet (IMDUR) 60 milliGRAM(s) Oral daily  magnesium oxide 400 milliGRAM(s) Oral daily  pantoprazole    Tablet 40 milliGRAM(s) Oral before breakfast  polyethylene glycol 3350 17 Gram(s) Oral daily  predniSONE   Tablet 40 milliGRAM(s) Oral daily  predniSONE   Tablet   Oral   senna 2 Tablet(s) Oral at bedtime    ROS: All others negative     PHYSICAL EXAM:  T(C): 36.8 (02-27-19 @ 07:59), Max: 37.1 (02-26-19 @ 23:48)  HR: 58 (02-27-19 @ 09:15) (58 - 68)  BP: 130/74 (02-27-19 @ 07:59) (128/71 - 134/76)  RR: 19 (02-27-19 @ 07:59) (18 - 19)  SpO2: 95% (02-27-19 @ 09:15) (95% - 95%)  Wt(kg): --  I&O's Summary    26 Feb 2019 07:01  -  27 Feb 2019 07:00  --------------------------------------------------------  IN: 0 mL / OUT: 400 mL / NET: -400 mL      Appearance: Normal	  HEENT:   Normal oral mucosa, PERRL, EOMI	  Lymphatic: No lymphadenopathy  Cardiovascular: Normal S1 S2, No JVD, No murmurs, trace LE edema  Respiratory: Decreased BS at right base and rales along left side   Psychiatry: A & O x 3, Mood & affect appropriate  Gastrointestinal:  Soft, Non-tender, + BS	  Skin: No rashes, No ecchymoses, No cyanosis  Neurologic: Non-focal  Extremities: Normal range of motion, No clubbing, cyanosis, trace edema     TELEMETRY: 	  Not on telemetry     LABS:	 	                        8.1    6.1   )-----------( 181      ( 27 Feb 2019 08:40 )             24.4     02-27    139  |  96<L>  |  87.0<H>  ----------------------------<  188<H>  4.9   |  35.0<H>  |  1.72<H>    Ca    8.3<L>      27 Feb 2019 08:40  Phos  2.8     02-27  Mg     2.6     02-27

## 2019-02-27 NOTE — CONSULT NOTE ADULT - ASSESSMENT
63 year old woman, with  multiple medical issues- CAD, CABG,  PAD s/p balloon angioplasty, diastolic HF, DM admitted with  with SOB and LE edema with hyperkalemia and anemia.  Patient found to have persistent pleural effusions, with KEITH

## 2019-02-27 NOTE — CONSULT NOTE ADULT - PROBLEM SELECTOR RECOMMENDATION 9
Pt likely intravascularly depleted.  Recommending albumin and diuresis.  Would recommend nephrology follow up/recommendations with elevated creatinine vs diuresis.  Repeat CXR in Am.  Possible IR pigtail in AM if no improvement. (Dr. Coronado to speak to IR)  Discussed above with Dr. Escudero, as well as Dr. Coronado.

## 2019-02-27 NOTE — PROGRESS NOTE ADULT - ASSESSMENT
ARF: decompensated CHF (R > L)==>pre renal   Elevated BUN also due to steroids  1.1g proteinuria  - Lasix held for now   - monitor labs; will need to keep azotemic  Component of increased BUN related to GIB and steroids   Losartan on hold     Anemia: +CRF +GIB==> s/p colonoscopy   s/p IV Fe  - cont QUENTIN  - monitor H/H; target Hgb=10.0

## 2019-02-27 NOTE — CONSULT NOTE ADULT - PROBLEM SELECTOR RECOMMENDATION 5
Consulted for goals of care.  Patient with multiple medical issues with recurrent hospitalizations. Home with  [facility].   Met with patient- answers only " yes " and "no", not engaging in conversation.  Spoke with  on phone. Plan to meet later this afternoon about 3:3O.

## 2019-02-27 NOTE — CONSULT NOTE ADULT - PROBLEM SELECTOR PROBLEM 4
KYREE (obstructive sleep apnea)
KEITH (acute kidney injury)
Obesity hypoventilation syndrome
Type 2 diabetes mellitus without complication, without long-term current use of insulin

## 2019-02-27 NOTE — CONSULT NOTE ADULT - PROBLEM SELECTOR PROBLEM 1
Acute on chronic respiratory failure with hypoxia and hypercapnia
Acute respiratory failure with hypercapnia
Heart failure, acute diastolic
Pleural effusion
Acute on chronic respiratory failure with hypoxia and hypercapnia

## 2019-02-27 NOTE — CONSULT NOTE ADULT - PROBLEM SELECTOR PROBLEM 2
Acute on chronic diastolic (congestive) heart failure
CAD (coronary artery disease)
CHF (congestive heart failure)
Pleural effusion
Acute on chronic congestive heart failure, unspecified heart failure type

## 2019-02-27 NOTE — PROGRESS NOTE ADULT - ASSESSMENT
Pt is a 64 yo presenting to ED w/ SOB 2/2 diastolic HF exacerbation and noted to have KEITH and hyperkalemia, PMH CAD s/p CABG and PCI, PAD s/p baloon angioplasty, DM2, HTN, HLD, hx of cardiac arrest, recent GI bleed last month, Diastolic CHF, legally blind, pleural effusion requiring thoracentesis in Dec 2018.         >Chest pain with hx of CAD & CABG/PCI- check EKG & TnI. Off note, pt known to have chronic angina. c/w Imdur, BB, statin, ASA. Plavix on hold since last admission due to GI Bleed.      1) Lower GI Bleed likely diverticular  - s/p 1 unit of PRBC  - GI Consult appreciated  - s/p Colonoscopy   ~ Poor prep throughout the colon. Diverticulosis seen. Unable to visualize colon sufficiently for poor prep  No further GI w/u recommended, ASA resumed as per GI, plavix yet on hold  Hg increased from 7.8 to 8.0 today, no further reported bleeding. Clear for d/c as per GI  Advanced diet     2) Hyperkalemia  - Hold Losartan    3) Acute on Chronic Diastolic Heart Failure with severe PHTN  & right ventricular failure- now with worsening   - Was initially on Lasix Infusion which is now off  CXR on 2/24 with worsening pulm vasc congestion, increasing BNP,  Demadex 20 mg BID was switched to lasix 40mg IV q12 but is now on hold due to worsening kidney function & metabolic alkalosis from diuresis.   Currently intravascularly volume depleted with 3rd spacing. Will give 250cc IVF & f/u BMP in am as discussed with  Dr. Recio.    Urine output not accurate as pt is incontinent, Strict I & O, daily weights.   Repeat CXR  on 2/26 with no significant improvement in PVC. Opacity right lung which may represent right pleural effusion and/or airspace opacity. Nonspecific lucencies overlies the right chest.     CT chest ordered: Moderate to large right and small left pleural effusions with almost complete atelectasis of the right lower lobe secondary to the pleural effusion; right pleural effusion has increased in size and left pleural effusion is new since 1/13/2019.Patchy groundglass opacities in the left lower lobe, either infectious/inflammatory or secondary to pulmonary edema. Additional ground glass attenuation in the lungs, likely pulmonary edema.Diffuse anasarca.Small amount of ascites within the imaged upper abdomen.    CTS consult called for possible thoracentesis  Afebrile, no leukocytosis. No PNA suspected.   c/w coreg & imdur.  - Hold Losartan due to hyperkalemia & now KEITH  - ECHO shows EF of > 75%. Moderate to Severe TR, mod-severe PHTN  Cardio follow up appreciated  Pt answers "no" when asked about breathing tube & resuscitation but is lethargic so cannot make a decision based on her mental status. Tried reaching  at [phone], left VM. Await call back. Palliative consult called for further [facility] assistance.     4) Acute on Chronic Hypercapnic Respiratory Failure  - H/O OHS/KYREE   Pulm recommend chronic NIV for chronic hypercarbic respiratory failure to improve symptoms and to decrease hospitalizations; BiPAP was considered and tried without resolution of hypercarbia  - Continue NIV (AVAPS) Nocturnal and PRN. Eventual [facility] with NIV (pt has AVAPS at home which will be taken to [facility] on d/c)  - ICU Consult appreciated.   - Pulmonary input appreciated  - Avoid Narcotics for pain  Low dose steroids added for tight airways, taper off quickly    5) Acute on chronic kidney injury- worsened  Hold lasix, ct hold losartan  Gentle IVF as discussed with cardio  f/u BMP in am  - Avoid nephrotoxic medications  - Renal input appreciated    6) CAD stents in 2014 / PAD / HLD / HTN  - Continue, Imdur, Lipitor, Coreg and Norvasc   - Plavix on hold since last admission due to GI Bleed. ASA resumed as per GI, plavix yet on hold    7) DM 2- uncontrolled due to steroids  - HbA1c 5.6  - Accu checks and ISS  - Increase Lantus to 10 units at bedtime, add humalog 3U    8) Anemia  - Multifactorial (chronic, now complicated with acute bleeding)  - Plan as above  - s/p Venofer   - Continue Procrit    9) Transaminitis  - Likely secondary to liver congestion  - Improved    10) History of Depression  - Continue Duloxetine 60 mg    11) IV infiltration LUE  - Arm elevation  - Warm compresses   - No DVT     Elevated BUN- likely due to steroids    Lethargy- ABG ordered, will do CT head if no improvement    DVT Prophylaxis -- heoarin sc resumed, f/u H/H    Dispo: [facility] with AVAPS (has at home) when resp stable

## 2019-02-27 NOTE — PROGRESS NOTE ADULT - PROBLEM SELECTOR PLAN 1
Off IV lasix due to KEITH  CT Chest with worsening pleural effusions and edema  Will discuss with Dr. Recio, but likely restart torsemide 20mg PO BID again.  May need a thoracentesis vs. chest tube  patient needs to be in a structured facility after discharge. Off IV lasix due to KEITH  CT Chest with worsening pleural effusions and edema  Patient third spacing, hold diuretics at this time.   May give gentle hydration as patient is intravascularly depleted.  May need a thoracentesis vs. chest tube by CT surgery vs. IR  Patient needs to be in a structured facility after discharge. Off IV lasix due to KEITH  CT Chest with worsening pleural effusions and edema  Patient third spacing, hold diuretics at this time.   May give gentle hydration as patient is intravascularly depleted.  May need a thoracentesis vs. chest tube by CT surgery vs. IR

## 2019-02-28 ENCOUNTER — RESULT REVIEW (OUTPATIENT)
Age: 64
End: 2019-02-28

## 2019-02-28 LAB
ANION GAP SERPL CALC-SCNC: 7 MMOL/L — SIGNIFICANT CHANGE UP (ref 5–17)
B PERT IGG+IGM PNL SER: ABNORMAL
BUN SERPL-MCNC: 96 MG/DL — HIGH (ref 8–20)
CALCIUM SERPL-MCNC: 8.3 MG/DL — LOW (ref 8.6–10.2)
CHLORIDE SERPL-SCNC: 95 MMOL/L — LOW (ref 98–107)
CO2 SERPL-SCNC: 35 MMOL/L — HIGH (ref 22–29)
COLOR FLD: ABNORMAL
CREAT SERPL-MCNC: 1.79 MG/DL — HIGH (ref 0.5–1.3)
EOSINOPHIL # BLD AUTO: 0.1 K/UL — SIGNIFICANT CHANGE UP (ref 0–0.5)
EOSINOPHIL NFR BLD AUTO: 2.2 % — SIGNIFICANT CHANGE UP (ref 0–6)
FLUID INTAKE SUBSTANCE CLASS: SIGNIFICANT CHANGE UP
FLUID SEGMENTED GRANULOCYTES: 62 % — SIGNIFICANT CHANGE UP
GLUCOSE BLDC GLUCOMTR-MCNC: 150 MG/DL — HIGH (ref 70–99)
GLUCOSE BLDC GLUCOMTR-MCNC: 153 MG/DL — HIGH (ref 70–99)
GLUCOSE BLDC GLUCOMTR-MCNC: 220 MG/DL — HIGH (ref 70–99)
GLUCOSE SERPL-MCNC: 192 MG/DL — HIGH (ref 70–115)
GRAM STN FLD: SIGNIFICANT CHANGE UP
HCT VFR BLD CALC: 24.8 % — LOW (ref 37–47)
HGB BLD-MCNC: 8.1 G/DL — LOW (ref 12–16)
LDH SERPL L TO P-CCNC: 220 U/L — HIGH (ref 98–192)
LYMPHOCYTES # BLD AUTO: 0.6 K/UL — LOW (ref 1–4.8)
LYMPHOCYTES # BLD AUTO: 12.3 % — LOW (ref 20–55)
LYMPHOCYTES # FLD: 35 % — SIGNIFICANT CHANGE UP
MAGNESIUM SERPL-MCNC: 2.7 MG/DL — HIGH (ref 1.6–2.6)
MCHC RBC-ENTMCNC: 29.2 PG — SIGNIFICANT CHANGE UP (ref 27–31)
MCHC RBC-ENTMCNC: 32.7 G/DL — SIGNIFICANT CHANGE UP (ref 32–36)
MCV RBC AUTO: 89.5 FL — SIGNIFICANT CHANGE UP (ref 81–99)
MONOCYTES # BLD AUTO: 0.6 K/UL — SIGNIFICANT CHANGE UP (ref 0–0.8)
MONOCYTES NFR BLD AUTO: 12.1 % — HIGH (ref 3–10)
MONOS+MACROS # FLD: 3 % — SIGNIFICANT CHANGE UP
NEUTROPHILS # BLD AUTO: 3.3 K/UL — SIGNIFICANT CHANGE UP (ref 1.8–8)
NEUTROPHILS NFR BLD AUTO: 73.2 % — HIGH (ref 37–73)
PH FLD: 8 — SIGNIFICANT CHANGE UP
PHOSPHATE SERPL-MCNC: 3.2 MG/DL — SIGNIFICANT CHANGE UP (ref 2.4–4.7)
PLATELET # BLD AUTO: 166 K/UL — SIGNIFICANT CHANGE UP (ref 150–400)
POTASSIUM SERPL-MCNC: 5.2 MMOL/L — SIGNIFICANT CHANGE UP (ref 3.5–5.3)
POTASSIUM SERPL-SCNC: 5.2 MMOL/L — SIGNIFICANT CHANGE UP (ref 3.5–5.3)
PROT SERPL-MCNC: 7 G/DL — SIGNIFICANT CHANGE UP (ref 6.6–8.7)
RBC # BLD: 2.77 M/UL — LOW (ref 4.4–5.2)
RBC # FLD: 16.3 % — HIGH (ref 11–15.6)
RCV VOL RI: 2882 /UL — HIGH (ref 0–5)
SODIUM SERPL-SCNC: 137 MMOL/L — SIGNIFICANT CHANGE UP (ref 135–145)
SPECIMEN SOURCE FLD: SIGNIFICANT CHANGE UP
SPECIMEN SOURCE: SIGNIFICANT CHANGE UP
TOTAL NUCLEATED CELL COUNT, BODY FLUID: 77 /UL — HIGH (ref 0–5)
TUBE TYPE: SIGNIFICANT CHANGE UP
WBC # BLD: 4.5 K/UL — LOW (ref 4.8–10.8)
WBC # FLD AUTO: 4.5 K/UL — LOW (ref 4.8–10.8)

## 2019-02-28 PROCEDURE — 71045 X-RAY EXAM CHEST 1 VIEW: CPT | Mod: 26

## 2019-02-28 PROCEDURE — 99231 SBSQ HOSP IP/OBS SF/LOW 25: CPT

## 2019-02-28 PROCEDURE — 99358 PROLONG SERVICE W/O CONTACT: CPT

## 2019-02-28 PROCEDURE — 99233 SBSQ HOSP IP/OBS HIGH 50: CPT

## 2019-02-28 PROCEDURE — 99232 SBSQ HOSP IP/OBS MODERATE 35: CPT

## 2019-02-28 PROCEDURE — 93010 ELECTROCARDIOGRAM REPORT: CPT

## 2019-02-28 PROCEDURE — 32557 INSERT CATH PLEURA W/ IMAGE: CPT | Mod: RT

## 2019-02-28 RX ORDER — BUMETANIDE 0.25 MG/ML
1 INJECTION INTRAMUSCULAR; INTRAVENOUS EVERY 12 HOURS
Qty: 0 | Refills: 0 | Status: DISCONTINUED | OUTPATIENT
Start: 2019-02-28 | End: 2019-03-01

## 2019-02-28 RX ORDER — ALBUMIN HUMAN 25 %
50 VIAL (ML) INTRAVENOUS EVERY 12 HOURS
Qty: 0 | Refills: 0 | Status: DISCONTINUED | OUTPATIENT
Start: 2019-02-28 | End: 2019-03-01

## 2019-02-28 RX ADMIN — PANTOPRAZOLE SODIUM 40 MILLIGRAM(S): 20 TABLET, DELAYED RELEASE ORAL at 05:00

## 2019-02-28 RX ADMIN — ATORVASTATIN CALCIUM 80 MILLIGRAM(S): 80 TABLET, FILM COATED ORAL at 20:15

## 2019-02-28 RX ADMIN — Medication 650 MILLIGRAM(S): at 21:14

## 2019-02-28 RX ADMIN — AMLODIPINE BESYLATE 10 MILLIGRAM(S): 2.5 TABLET ORAL at 05:01

## 2019-02-28 RX ADMIN — Medication 3 MILLILITER(S): at 04:02

## 2019-02-28 RX ADMIN — Medication 100 MILLIGRAM(S): at 05:00

## 2019-02-28 RX ADMIN — Medication 81 MILLIGRAM(S): at 13:12

## 2019-02-28 RX ADMIN — ONDANSETRON 4 MILLIGRAM(S): 8 TABLET, FILM COATED ORAL at 19:50

## 2019-02-28 RX ADMIN — GABAPENTIN 100 MILLIGRAM(S): 400 CAPSULE ORAL at 05:02

## 2019-02-28 RX ADMIN — Medication 3 UNIT(S): at 15:52

## 2019-02-28 RX ADMIN — ISOSORBIDE MONONITRATE 60 MILLIGRAM(S): 60 TABLET, EXTENDED RELEASE ORAL at 13:13

## 2019-02-28 RX ADMIN — INSULIN GLARGINE 10 UNIT(S): 100 INJECTION, SOLUTION SUBCUTANEOUS at 20:15

## 2019-02-28 RX ADMIN — Medication 650 MILLIGRAM(S): at 20:14

## 2019-02-28 RX ADMIN — Medication 3 MILLILITER(S): at 20:44

## 2019-02-28 RX ADMIN — Medication 2: at 07:49

## 2019-02-28 RX ADMIN — Medication 3 UNIT(S): at 13:11

## 2019-02-28 RX ADMIN — Medication 50 MILLILITER(S): at 17:49

## 2019-02-28 RX ADMIN — GABAPENTIN 100 MILLIGRAM(S): 400 CAPSULE ORAL at 13:12

## 2019-02-28 RX ADMIN — Medication 3 MILLILITER(S): at 14:58

## 2019-02-28 RX ADMIN — POLYETHYLENE GLYCOL 3350 17 GRAM(S): 17 POWDER, FOR SOLUTION ORAL at 13:12

## 2019-02-28 RX ADMIN — GABAPENTIN 100 MILLIGRAM(S): 400 CAPSULE ORAL at 20:15

## 2019-02-28 RX ADMIN — MAGNESIUM OXIDE 400 MG ORAL TABLET 400 MILLIGRAM(S): 241.3 TABLET ORAL at 13:12

## 2019-02-28 RX ADMIN — BUMETANIDE 1 MILLIGRAM(S): 0.25 INJECTION INTRAMUSCULAR; INTRAVENOUS at 20:14

## 2019-02-28 RX ADMIN — HEPARIN SODIUM 5000 UNIT(S): 5000 INJECTION INTRAVENOUS; SUBCUTANEOUS at 15:51

## 2019-02-28 RX ADMIN — Medication 1: at 13:11

## 2019-02-28 RX ADMIN — DULOXETINE HYDROCHLORIDE 60 MILLIGRAM(S): 30 CAPSULE, DELAYED RELEASE ORAL at 13:13

## 2019-02-28 RX ADMIN — Medication 3 UNIT(S): at 07:48

## 2019-02-28 RX ADMIN — Medication 100 MILLIGRAM(S): at 15:53

## 2019-02-28 RX ADMIN — Medication 40 MILLIGRAM(S): at 05:01

## 2019-02-28 NOTE — BRIEF OPERATIVE NOTE - OPERATION/FINDINGS
10 Fr R chest tube placed
Poor prep throughout the colon. Diverticulosis seen. Unable to visualize colon sufficiently for poor prep

## 2019-02-28 NOTE — GOALS OF CARE CONVERSATION - PERSONAL ADVANCE DIRECTIVE - CONVERSATION DETAILS
Sw and palliative physician met with patients spouse and dgt. Patients sister and patients son was on speaker phone. Education regarding medical issues and prognosis of disease addressed. Pts dgt crying at times with reality of severity of condition and states that she ia aware of problems they have been faced with over last few years. Patients son needed clarification regarding cardiac issues and was hopeful that cardiac procedures could be offered. Questions clarified and increase understanding of disease progression identified. Patients sister who is nurse seemed to believe that more could be done and stated patient is young and asked about what we could do to get her better. Palliative physician clarified patients heart failure, lung issues and impact on kidneys. Physician also explained comfort measures to family and education regarding Hospice services. Family initially shocked. Support provided to family to facilitate coping with all news advised in meeting and to consider Hospice care for management of patient needs. Patients spouse states he works and patient would need care at home. Patients dgt reports just buying house and getting . Advance care planning addressed with dgt and spouse. Dgt reports that patient had verbalized to her in past that she would want natural death and not be placed on life support. Patients spouse concurs and states he wants everything possible to be done to help in patients care but if heart/lungs stop allow for natural death. MOLST completed.

## 2019-02-28 NOTE — PROGRESS NOTE ADULT - SUBJECTIVE AND OBJECTIVE BOX
Patient with worsening renal function.     Continue to hold diuretic therapy.     Agree with thoracentesis/pigtail for symptomatic relief.     Very poor prognosis considering RV failure secondary to obesity/pulm hypertension.

## 2019-02-28 NOTE — PROGRESS NOTE ADULT - SUBJECTIVE AND OBJECTIVE BOX
CC:  follow up GOC  INTERVAL HPI/OVERNIGHT EVENTS:    PRESENT SYMPTOMS: SOURCE:  Patient/Family/Team    PAIN SCALE:  0 = none  1 = mild   2 = moderate  3 = severe    Pain:     Dyspnea:  [x ] YES [ ] NO  Anxiety:  [ ] YES [ x] NO  Fatigue: [x ] YES [ ] NO  Nausea: [ ] YES [ ] NO  na  Loss of Appetite: [ x] YES [ ] NO  Other symptoms: __________    MEDICATIONS  (STANDING):  ALBUTerol    0.083% 10 milliGRAM(s) Nebulizer once  ALBUTerol/ipratropium for Nebulization 3 milliLiter(s) Nebulizer every 6 hours  amLODIPine   Tablet 10 milliGRAM(s) Oral daily  aspirin enteric coated 81 milliGRAM(s) Oral daily  atorvastatin 80 milliGRAM(s) Oral at bedtime  carvedilol 25 milliGRAM(s) Oral every 12 hours  dextrose 5%. 1000 milliLiter(s) (50 mL/Hr) IV Continuous <Continuous>  dextrose 50% Injectable 12.5 Gram(s) IV Push once  dextrose 50% Injectable 25 Gram(s) IV Push once  dextrose 50% Injectable 25 Gram(s) IV Push once  docusate sodium 100 milliGRAM(s) Oral two times a day  DULoxetine 60 milliGRAM(s) Oral daily  epoetin sara Injectable 26438 Unit(s) SubCutaneous <User Schedule>  gabapentin 100 milliGRAM(s) Oral three times a day  heparin  Injectable 5000 Unit(s) SubCutaneous every 12 hours  insulin glargine Injectable (LANTUS) 10 Unit(s) SubCutaneous at bedtime  insulin lispro (HumaLOG) corrective regimen sliding scale   SubCutaneous three times a day before meals  insulin lispro Injectable (HumaLOG) 3 Unit(s) SubCutaneous three times a day with meals  isosorbide   mononitrate ER Tablet (IMDUR) 60 milliGRAM(s) Oral daily  magnesium oxide 400 milliGRAM(s) Oral daily  pantoprazole    Tablet 40 milliGRAM(s) Oral before breakfast  polyethylene glycol 3350 17 Gram(s) Oral daily  predniSONE   Tablet   Oral   senna 2 Tablet(s) Oral at bedtime    MEDICATIONS  (PRN):  acetaminophen   Tablet .. 650 milliGRAM(s) Oral every 6 hours PRN Mild Pain (1 - 3)  dextrose 40% Gel 15 Gram(s) Oral once PRN Blood Glucose LESS THAN 70 milliGRAM(s)/deciliter  glucagon  Injectable 1 milliGRAM(s) IntraMuscular once PRN Glucose LESS THAN 70 milligrams/deciliter  guaiFENesin    Syrup 100 milliGRAM(s) Oral every 6 hours PRN Cough  ondansetron Injectable 4 milliGRAM(s) IV Push every 6 hours PRN Nausea and/or Vomiting      Allergies    Accupril (Other)    Intolerances      Karnofsky Performance Score/Palliative Performance Status Version 2:  30 %    Vital Signs Last 24 Hrs  T(C): 36.9 (28 Feb 2019 07:48), Max: 36.9 (28 Feb 2019 07:48)  T(F): 98.4 (28 Feb 2019 07:48), Max: 98.4 (28 Feb 2019 07:48)  HR: 62 (28 Feb 2019 07:48) (55 - 62)  BP: 130/74 (28 Feb 2019 07:48) (127/56 - 138/68)  BP(mean): --  RR: 19 (28 Feb 2019 07:48) (18 - 19)  SpO2: 98% (28 Feb 2019 08:36) (96% - 100%)    PHYSICAL EXAM:    General:   NAD    HEENT: [xormal  [ ] dry mouth  [ ] ET tube/trach    Lungs: [x ] comfortable [ ] tachypnea/labored breathing  [ ] excessive secretions    CV: [x ] normal  [ ] tachycardia    GI: [x ] normal  [ ] distended  [ ] tender  [ ] no BS               [ ] PEG/NG/OG tube    : [ x] normal  [ ] incontinent  [ ] oliguria/anuria  [ ] fontenot    MSK: [ ] normal  [x ] weakness  [ ] edema             [ ] ambulatory  [x ] bedbound/wheelchair bound    Skin: [ ] normal  [ ] pressure ulcers- Stage_____  [x ] no rash    LABS:                        8.1    4.5   )-----------( 166      ( 28 Feb 2019 08:27 )             24.8     02-28    137  |  95<L>  |  96.0<H>  ----------------------------<  192<H>  5.2   |  35.0<H>  |  1.79<H>    Ca    8.3<L>      28 Feb 2019 08:27  Phos  3.2     02-28  Mg     2.7     02-28    TPro  7.0  /  Alb  x   /  TBili  x   /  DBili  x   /  AST  x   /  ALT  x   /  AlkPhos  x   02-28        I&O's Summary    27 Feb 2019 07:01  -  28 Feb 2019 07:00  --------------------------------------------------------  IN: 210 mL / OUT: 300 mL / NET: -90 mL        RADIOLOGY & ADDITIONAL STUDIES:  < from: Xray Chest 1 View- PORTABLE-Urgent (02.27.19 @ 11:11) >     EXAM:  XR CHEST PORTABLE URGENT 1V                          PROCEDURE DATE:  02/27/2019          INTERPRETATION:  TECHNIQUE: Single portable view of the chest.    COMPARISON: 2/26/2019    CLINICAL HISTORY: Pleural effusion, heart failure.     FINDINGS:    Single frontal view of the chest demonstrates moderate to severe CHF.   Moderate right-sided pleural effusion. The cardiomediastinal silhouette   is enlarged. Mediastinal sternotomy wires. No acute osseous   abnormalities. Overlying EKG leadsand wires are noted.    IMPRESSION: Moderate to severe CHF. Moderate right-sided pleural   effusion. Findings are unchanged.                LIZA JIMENEZ M.D., ATTENDING RADIOLOGIST  This document has been electronically signed. Feb 27 2019 12:55PM          < end of copied text >        Thank you for the opportunity to assist with the care of this patient.   [facility] Consult [facility] [phone].

## 2019-02-28 NOTE — PROGRESS NOTE ADULT - ASSESSMENT
63 year old woman, with  multiple medical issues-  OHS, CAD, CABG,  PAD s/p balloon angioplasty, diastolic HF, DM admitted with  with SOB and LE edema with hyperkalemia and anemia.  Patient found to have persistent pleural effusions 2/2 right sided HF

## 2019-02-28 NOTE — PROGRESS NOTE ADULT - SUBJECTIVE AND OBJECTIVE BOX
NEPHROLOGY INTERVAL HPI/OVERNIGHT EVENTS:    Seen earlier   S/P pigtail drainage of effusion   Cardio follow up noted   Diuretics on hold   Worsening Anasarca   now DNR / DNI       MEDICATIONS  (STANDING):  ALBUTerol    0.083% 10 milliGRAM(s) Nebulizer once  ALBUTerol/ipratropium for Nebulization 3 milliLiter(s) Nebulizer every 6 hours  amLODIPine   Tablet 10 milliGRAM(s) Oral daily  aspirin enteric coated 81 milliGRAM(s) Oral daily  atorvastatin 80 milliGRAM(s) Oral at bedtime  carvedilol 25 milliGRAM(s) Oral every 12 hours  dextrose 5%. 1000 milliLiter(s) (50 mL/Hr) IV Continuous <Continuous>  dextrose 50% Injectable 12.5 Gram(s) IV Push once  dextrose 50% Injectable 25 Gram(s) IV Push once  dextrose 50% Injectable 25 Gram(s) IV Push once  docusate sodium 100 milliGRAM(s) Oral two times a day  DULoxetine 60 milliGRAM(s) Oral daily  epoetin sara Injectable 45757 Unit(s) SubCutaneous <User Schedule>  gabapentin 100 milliGRAM(s) Oral three times a day  heparin  Injectable 5000 Unit(s) SubCutaneous every 12 hours  insulin glargine Injectable (LANTUS) 10 Unit(s) SubCutaneous at bedtime  insulin lispro (HumaLOG) corrective regimen sliding scale   SubCutaneous three times a day before meals  insulin lispro Injectable (HumaLOG) 3 Unit(s) SubCutaneous three times a day with meals  isosorbide   mononitrate ER Tablet (IMDUR) 60 milliGRAM(s) Oral daily  magnesium oxide 400 milliGRAM(s) Oral daily  pantoprazole    Tablet 40 milliGRAM(s) Oral before breakfast  polyethylene glycol 3350 17 Gram(s) Oral daily  predniSONE   Tablet   Oral   senna 2 Tablet(s) Oral at bedtime    MEDICATIONS  (PRN):  acetaminophen   Tablet .. 650 milliGRAM(s) Oral every 6 hours PRN Mild Pain (1 - 3)  dextrose 40% Gel 15 Gram(s) Oral once PRN Blood Glucose LESS THAN 70 milliGRAM(s)/deciliter  glucagon  Injectable 1 milliGRAM(s) IntraMuscular once PRN Glucose LESS THAN 70 milligrams/deciliter  guaiFENesin    Syrup 100 milliGRAM(s) Oral every 6 hours PRN Cough  ondansetron Injectable 4 milliGRAM(s) IV Push every 6 hours PRN Nausea and/or Vomiting      Allergies    Accupril (Other)    Intolerances          Vital Signs Last 24 Hrs  T(C): 36.9 (28 Feb 2019 07:48), Max: 36.9 (28 Feb 2019 07:48)  T(F): 98.4 (28 Feb 2019 07:48), Max: 98.4 (28 Feb 2019 07:48)  HR: 62 (28 Feb 2019 07:48) (55 - 62)  BP: 130/74 (28 Feb 2019 07:48) (127/56 - 138/68)  BP(mean): --  RR: 19 (28 Feb 2019 07:48) (18 - 19)  SpO2: 98% (28 Feb 2019 08:36) (96% - 100%)  Daily     Daily   I&O's Detail    27 Feb 2019 07:01  -  28 Feb 2019 07:00  --------------------------------------------------------  IN:    sodium chloride 0.9%: 210 mL  Total IN: 210 mL    OUT:    Voided: 300 mL  Total OUT: 300 mL    Total NET: -90 mL        I&O's Summary    27 Feb 2019 07:01  -  28 Feb 2019 07:00  --------------------------------------------------------  IN: 210 mL / OUT: 300 mL / NET: -90 mL        PHYSICAL EXAM:    CVS : Normal S1 S2, No rub  Respiratory: Dec BS at bases 	  Gastrointestinal:  Soft, Non-tender, + BS	  Extremities: ++ Anasarca   LABS:                        8.1    4.5   )-----------( 166      ( 28 Feb 2019 08:27 )             24.8     02-28    137  |  95<L>  |  96.0<H>  ----------------------------<  192<H>  5.2   |  35.0<H>  |  1.79<H>    Ca    8.3<L>      28 Feb 2019 08:27  Phos  3.2     02-28  Mg     2.7     02-28    TPro  7.0  /  Alb  x   /  TBili  x   /  DBili  x   /  AST  x   /  ALT  x   /  AlkPhos  x   02-28        Magnesium, Serum: 2.7 mg/dL (02-28 @ 08:27)  Phosphorus Level, Serum: 3.2 mg/dL (02-28 @ 08:27)    ABG - ( 27 Feb 2019 12:01 )  pH, Arterial: 7.36  pH, Blood: x     /  pCO2: 66    /  pO2: 76    / HCO3: 34    / Base Excess: 10.4  /  SaO2: 96                    RADIOLOGY & ADDITIONAL TESTS:

## 2019-02-28 NOTE — PROGRESS NOTE ADULT - ASSESSMENT
ARF: decompensated CHF (R > L)==>pre renal   Elevated BUN also due to steroids  1.1g proteinuria  - Lasix held for now   - monitor labs; will need to keep azotemic  Component of increased BUN related to GIB and steroids   Losartan on hold   Worsening Anasarca ---> Will add SPA / Bumex ( see orders )   Palliative Care working with family   Strict I/O     Anemia: +CRF +GIB==> s/p colonoscopy   s/p IV Fe  - cont QUENTIN  - monitor H/H; target Hgb=10.0

## 2019-02-28 NOTE — PROGRESS NOTE ADULT - SUBJECTIVE AND OBJECTIVE BOX
CHIEF COMPLAINT/INTERVAL HISTORY:    Patient is a 63y old  Female who presents with a chief complaint of SOB (27 Feb 2019 13:54)      HPI:  Pt is a 64 yo presenting to ED w/ SOB, PMH CAD s/p CABG and PCI, PAD s/p baloon angioplasty, DM2, HTN, HLD, hx of cardiac arrest, recent GI bleed last month, Diastolic CHF, legally blind, pleural effusion requiring thoracentesis in Dec 2018.   Patient states that she has been having increasing SOB over the last week, she lives at home with her family, denies sick contacts, but has been having more exertional dyspnea. She states she walks with a walker but it is taking less activity for her dyspnea to occur. She has been compliant with all of her medications, she does not recall the names, but states that there have been no changes to her medication regimen since her discharge. She denies any associated chest pain or pressure. She has been taking her tosemide she believes. Denies PND or orthopnea, but she has noted increased LE edema.   Denies headaches, nausea, vomiting, chest pain, palpitations, abdominal pain, constipation, diarrhea, melena, hematochezia, dysuria.   Her SOB has since resolved. She is breathing comfortably, has no complaints.  In ED patient was noted to have hyperkalemia on repeat labs, troponin elevated at 0.09. Her creatinine is also elevated. She did not receive any potassium treatment initially so I ordered Calcium gluc, kayexelate, Insulin, dextrose, Albuterol. EKG without widened QRS or peaked T waves. (11 Feb 2019 03:58)      SUBJECTIVE & OBJECTIVE/ ROS: Pt seen and examined at bedside. Pt yet on 3L NC (does not user O2 at home) with AVAPS at night. Saturating 97% on 3L. Pt feels her breathing is "better". Appears awake today. Await IR for pigtail placement. No other overnight issues reported      ICU Vital Signs Last 24 Hrs  T(C): 36.9 (28 Feb 2019 07:48), Max: 36.9 (28 Feb 2019 07:48)  T(F): 98.4 (28 Feb 2019 07:48), Max: 98.4 (28 Feb 2019 07:48)  HR: 62 (28 Feb 2019 07:48) (55 - 62)  BP: 130/74 (28 Feb 2019 07:48) (127/56 - 138/68)  BP(mean): --  ABP: --  ABP(mean): --  RR: 19 (28 Feb 2019 07:48) (18 - 19)  SpO2: 98% (28 Feb 2019 08:36) (96% - 100%)        MEDICATIONS  (STANDING):  ALBUTerol    0.083% 10 milliGRAM(s) Nebulizer once  ALBUTerol/ipratropium for Nebulization 3 milliLiter(s) Nebulizer every 6 hours  amLODIPine   Tablet 10 milliGRAM(s) Oral daily  aspirin enteric coated 81 milliGRAM(s) Oral daily  atorvastatin 80 milliGRAM(s) Oral at bedtime  carvedilol 25 milliGRAM(s) Oral every 12 hours  dextrose 5%. 1000 milliLiter(s) (50 mL/Hr) IV Continuous <Continuous>  dextrose 50% Injectable 12.5 Gram(s) IV Push once  dextrose 50% Injectable 25 Gram(s) IV Push once  dextrose 50% Injectable 25 Gram(s) IV Push once  docusate sodium 100 milliGRAM(s) Oral two times a day  DULoxetine 60 milliGRAM(s) Oral daily  epoetin sara Injectable 30777 Unit(s) SubCutaneous <User Schedule>  gabapentin 100 milliGRAM(s) Oral three times a day  heparin  Injectable 5000 Unit(s) SubCutaneous every 12 hours  insulin glargine Injectable (LANTUS) 10 Unit(s) SubCutaneous at bedtime  insulin lispro (HumaLOG) corrective regimen sliding scale   SubCutaneous three times a day before meals  insulin lispro Injectable (HumaLOG) 3 Unit(s) SubCutaneous three times a day with meals  isosorbide   mononitrate ER Tablet (IMDUR) 60 milliGRAM(s) Oral daily  magnesium oxide 400 milliGRAM(s) Oral daily  pantoprazole    Tablet 40 milliGRAM(s) Oral before breakfast  polyethylene glycol 3350 17 Gram(s) Oral daily  predniSONE   Tablet   Oral   senna 2 Tablet(s) Oral at bedtime    MEDICATIONS  (PRN):  acetaminophen   Tablet .. 650 milliGRAM(s) Oral every 6 hours PRN Mild Pain (1 - 3)  dextrose 40% Gel 15 Gram(s) Oral once PRN Blood Glucose LESS THAN 70 milliGRAM(s)/deciliter  glucagon  Injectable 1 milliGRAM(s) IntraMuscular once PRN Glucose LESS THAN 70 milligrams/deciliter  guaiFENesin    Syrup 100 milliGRAM(s) Oral every 6 hours PRN Cough  ondansetron Injectable 4 milliGRAM(s) IV Push every 6 hours PRN Nausea and/or Vomiting      LABS:                        8.1    4.5   )-----------( 166      ( 28 Feb 2019 08:27 )             24.8     02-28    137  |  95<L>  |  96.0<H>  ----------------------------<  192<H>  5.2   |  35.0<H>  |  1.79<H>    Ca    8.3<L>      28 Feb 2019 08:27  Phos  3.2     02-28  Mg     2.7     02-28    TPro  7.0  /  Alb  x   /  TBili  x   /  DBili  x   /  AST  x   /  ALT  x   /  AlkPhos  x   02-28          CAPILLARY BLOOD GLUCOSE      POCT Blood Glucose.: 220 mg/dL (28 Feb 2019 07:32)  POCT Blood Glucose.: 273 mg/dL (27 Feb 2019 21:13)  POCT Blood Glucose.: 284 mg/dL (27 Feb 2019 16:15)  POCT Blood Glucose.: 257 mg/dL (27 Feb 2019 11:24)      RECENT CULTURES:      RADIOLOGY & ADDITIONAL TESTS:      PHYSICAL EXAM:    GENERAL: NAD, 3L NC, , morbidly obese  HEAD:  Atraumatic, Normocephalic  EYES: EOMI,  Opaque right cornea.  ENMT: Moist mucous membranes  NECK: Supple, No JVD  NERVOUS SYSTEM:  Alert & Oriented X3, Motor Strength 5/5 B/L upper and lower extremities; DTRs 2+ intact and symmetric  CHEST/LUNG: decreased BS b/l; No rales, rhonchi, wheezing, or rubs  HEART: Regular rate and rhythm; No murmurs, rubs, or gallops  Ext: 2+ pedal edema. No calf tenderness. 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 Dry scab on right heel. Old healed scars on left leg. Swelling in b/l UE   Neuro:  AAO x 3. No focal deficit. Moving all extremities  Skin: Warm and Dry  Psychiatry: Normal mood and affect CHIEF COMPLAINT/INTERVAL HISTORY:    Patient is a 63y old  Female who presents with a chief complaint of SOB (27 Feb 2019 13:54)      HPI:  Pt is a 62 yo presenting to ED w/ SOB, PMH CAD s/p CABG and PCI, PAD s/p baloon angioplasty, DM2, HTN, HLD, hx of cardiac arrest, recent GI bleed last month, Diastolic CHF, legally blind, pleural effusion requiring thoracentesis in Dec 2018.   Patient states that she has been having increasing SOB over the last week, she lives at home with her family, denies sick contacts, but has been having more exertional dyspnea. She states she walks with a walker but it is taking less activity for her dyspnea to occur. She has been compliant with all of her medications, she does not recall the names, but states that there have been no changes to her medication regimen since her discharge. She denies any associated chest pain or pressure. She has been taking her tosemide she believes. Denies PND or orthopnea, but she has noted increased LE edema.   Denies headaches, nausea, vomiting, chest pain, palpitations, abdominal pain, constipation, diarrhea, melena, hematochezia, dysuria.   Her SOB has since resolved. She is breathing comfortably, has no complaints.  In ED patient was noted to have hyperkalemia on repeat labs, troponin elevated at 0.09. Her creatinine is also elevated. She did not receive any potassium treatment initially so I ordered Calcium gluc, kayexelate, Insulin, dextrose, Albuterol. EKG without widened QRS or peaked T waves. (11 Feb 2019 03:58)      SUBJECTIVE & OBJECTIVE/ ROS: Pt seen and examined at bedside. Pt yet on 3L NC (does not user O2 at home) with AVAPS at night. Saturating 97% on 3L. Pt feels her breathing is "better". Appears awake today. Await IR for pigtail placement. No other overnight issues reported      ICU Vital Signs Last 24 Hrs  T(C): 36.9 (28 Feb 2019 07:48), Max: 36.9 (28 Feb 2019 07:48)  T(F): 98.4 (28 Feb 2019 07:48), Max: 98.4 (28 Feb 2019 07:48)  HR: 62 (28 Feb 2019 07:48) (55 - 62)  BP: 130/74 (28 Feb 2019 07:48) (127/56 - 138/68)  BP(mean): --  ABP: --  ABP(mean): --  RR: 19 (28 Feb 2019 07:48) (18 - 19)  SpO2: 98% (28 Feb 2019 08:36) (96% - 100%)        MEDICATIONS  (STANDING):  ALBUTerol    0.083% 10 milliGRAM(s) Nebulizer once  ALBUTerol/ipratropium for Nebulization 3 milliLiter(s) Nebulizer every 6 hours  amLODIPine   Tablet 10 milliGRAM(s) Oral daily  aspirin enteric coated 81 milliGRAM(s) Oral daily  atorvastatin 80 milliGRAM(s) Oral at bedtime  carvedilol 25 milliGRAM(s) Oral every 12 hours  dextrose 5%. 1000 milliLiter(s) (50 mL/Hr) IV Continuous <Continuous>  dextrose 50% Injectable 12.5 Gram(s) IV Push once  dextrose 50% Injectable 25 Gram(s) IV Push once  dextrose 50% Injectable 25 Gram(s) IV Push once  docusate sodium 100 milliGRAM(s) Oral two times a day  DULoxetine 60 milliGRAM(s) Oral daily  epoetin sara Injectable 60593 Unit(s) SubCutaneous <User Schedule>  gabapentin 100 milliGRAM(s) Oral three times a day  heparin  Injectable 5000 Unit(s) SubCutaneous every 12 hours  insulin glargine Injectable (LANTUS) 10 Unit(s) SubCutaneous at bedtime  insulin lispro (HumaLOG) corrective regimen sliding scale   SubCutaneous three times a day before meals  insulin lispro Injectable (HumaLOG) 3 Unit(s) SubCutaneous three times a day with meals  isosorbide   mononitrate ER Tablet (IMDUR) 60 milliGRAM(s) Oral daily  magnesium oxide 400 milliGRAM(s) Oral daily  pantoprazole    Tablet 40 milliGRAM(s) Oral before breakfast  polyethylene glycol 3350 17 Gram(s) Oral daily  predniSONE   Tablet   Oral   senna 2 Tablet(s) Oral at bedtime    MEDICATIONS  (PRN):  acetaminophen   Tablet .. 650 milliGRAM(s) Oral every 6 hours PRN Mild Pain (1 - 3)  dextrose 40% Gel 15 Gram(s) Oral once PRN Blood Glucose LESS THAN 70 milliGRAM(s)/deciliter  glucagon  Injectable 1 milliGRAM(s) IntraMuscular once PRN Glucose LESS THAN 70 milligrams/deciliter  guaiFENesin    Syrup 100 milliGRAM(s) Oral every 6 hours PRN Cough  ondansetron Injectable 4 milliGRAM(s) IV Push every 6 hours PRN Nausea and/or Vomiting      LABS:                        8.1    4.5   )-----------( 166      ( 28 Feb 2019 08:27 )             24.8     02-28    137  |  95<L>  |  96.0<H>  ----------------------------<  192<H>  5.2   |  35.0<H>  |  1.79<H>    Ca    8.3<L>      28 Feb 2019 08:27  Phos  3.2     02-28  Mg     2.7     02-28    TPro  7.0  /  Alb  x   /  TBili  x   /  DBili  x   /  AST  x   /  ALT  x   /  AlkPhos  x   02-28          CAPILLARY BLOOD GLUCOSE      POCT Blood Glucose.: 220 mg/dL (28 Feb 2019 07:32)  POCT Blood Glucose.: 273 mg/dL (27 Feb 2019 21:13)  POCT Blood Glucose.: 284 mg/dL (27 Feb 2019 16:15)  POCT Blood Glucose.: 257 mg/dL (27 Feb 2019 11:24)      RECENT CULTURES:      RADIOLOGY & ADDITIONAL TESTS:      PHYSICAL EXAM:    GENERAL: NAD, 3L NC, , morbidly obese  HEAD:  Atraumatic, Normocephalic  EYES: EOMI,  Opaque right cornea.  ENMT: Moist mucous membranes  NECK: Supple, No JVD  NERVOUS SYSTEM:  Alert & Oriented X3, Motor Strength 5/5 B/L upper and lower extremities; DTRs 2+ intact and symmetric  CHEST/LUNG: decreased BS b/l; No rales, rhonchi, wheezing, or rubs  HEART: Regular rate and rhythm; No murmurs, rubs, or gallops  Abd: sodt, NT, ND, +BS, +Anasarca  Ext: 2+ pedal edema. No calf tenderness. 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 Dry scab on right heel. Old healed scars on left leg.  b/l UE swelling  Neuro:  AAO x 3. No focal deficit. Moving all extremities  Skin: Warm and Dry  Psychiatry: Normal mood and affect

## 2019-02-28 NOTE — BRIEF OPERATIVE NOTE - PRE-OP DX
Pleural effusion  02/28/2019    Active  Hilario Perez  Rectal bleeding  02/22/2019    Active  Maxwell Nava
Rectal bleeding  02/22/2019    Active  Maxwell Nava

## 2019-02-28 NOTE — PROGRESS NOTE ADULT - SUBJECTIVE AND OBJECTIVE BOX
Subjective: "I had to vomit after the tube went in" NAD no current nausea or vomiting. Reports improved breathing. Denies cp.    T(C): 36.8 (02-28-19 @ 18:30), Max: 36.9 (02-28-19 @ 07:48)  HR: 58 (02-28-19 @ 18:30) (55 - 62)  BP: 118/62 (02-28-19 @ 18:30) (114/64 - 138/68)  RR: 18 (02-28-19 @ 18:30) (18 - 19)  SpO2: 98% (02-28-19 @ 18:30) (94% - 100%)    I&O's Detail    27 Feb 2019 07:01  -  28 Feb 2019 07:00  --------------------------------------------------------  IN:    sodium chloride 0.9%: 210 mL  Total IN: 210 mL    OUT:    Voided: 300 mL  Total OUT: 300 mL    Total NET: -90 mL      28 Feb 2019 07:01  -  28 Feb 2019 18:42  --------------------------------------------------------  IN:  Total IN: 0 mL    OUT:    Chest Tube: 450 mL    Voided: 800 mL  Total OUT: 1250 mL    Total NET: -1250 mL          Patient's  laboratory results and current medications reviewed.    Physical Exam:  Gen: obese/WD NAD  Neuro: AAOx3, nonfocal  Pulm: diminished right base  CV: RRR  Tube: draining sersang fluid    Today's CXR:     < from: Xray Chest 1 View- PORTABLE-Urgent (02.28.19 @ 14:46) >    Interval placement of right chest tube with an interval decrease in size   of the previously seen right pleural effusion.    Pulmonary edema.      < end of copied text >

## 2019-02-28 NOTE — PROGRESS NOTE ADULT - PROBLEM SELECTOR PLAN 5
See [facility] note  In summary:  1.  and daughter agreeable to DNR/I. MOLST completed  2. Discussed consideration for hospice.  Family will think about it.

## 2019-02-28 NOTE — PROGRESS NOTE ADULT - ASSESSMENT
63 year old female with PMh as above.  Presented with SOB and LE edema with hyperkalemia and anemia.  Now with persistent pleural effusions and oxygen requirement.  + KEITH.  Diuretics being held at this time.  CTS consulted for pigtail placement which was completed today in IR with 1400+ serosang fluid drained so far. CXR shows good placement with improved aeration. Maintain tube for now.

## 2019-02-28 NOTE — GOALS OF CARE CONVERSATION - PERSONAL ADVANCE DIRECTIVE - TREATMENT GUIDELINE COMMENT
Cont current treatments.  Patient DNR/I.  MOLST done.  Family to think about hospice    Time Start 1:10pm  Time end: 1:55pm
DNR/DNI

## 2019-02-28 NOTE — PROGRESS NOTE ADULT - ASSESSMENT
Pt is a 64 yo presenting to ED w/ SOB 2/2 diastolic HF exacerbation and noted to have KEITH and hyperkalemia, PMH CAD s/p CABG and PCI, PAD s/p baloon angioplasty, DM2, HTN, HLD, hx of cardiac arrest, recent GI bleed last month, Diastolic CHF, legally blind, pleural effusion requiring thoracentesis in Dec 2018.         >Chest pain with hx of CAD & CABG/PCI-  mild TnI elevation likely from demand ischemia & poor clearence. Disccussed with cardio. Will not follow further TnI. c/w Imdur, BB, statin, ASA. Plavix on hold since last admission due to GI Bleed.      1) Lower GI Bleed likely diverticular  - s/p 1 unit of PRBC  - GI Consult appreciated  - s/p Colonoscopy   ~ Poor prep throughout the colon. Diverticulosis seen. Unable to visualize colon sufficiently for poor prep  No further GI w/u recommended, ASA resumed as per GI, plavix yet on hold  Hg increased from 7.8 to 8.0 today, no further reported bleeding. Clear for d/c as per GI  Advanced diet     2) Hyperkalemia  - Hold Losartan    3) Acute on Chronic Diastolic Heart Failure with severe PHTN  & right ventricular failure- now with clinical worsening   - Was initially on Lasix Infusion which is now off  CXR on 2/24 with worsening pulm vasc congestion, increasing BNP,  Demadex 20 mg BID was switched to lasix 40mg IV q12 but is now on hold due to worsening kidney function & metabolic alkalosis from diuresis.   Currently intravascularly volume depleted with 3rd spacing. Will give 250cc IVF & f/u BMP in am as discussed with  Dr. Recio.    Urine output not accurate as pt is incontinent, Strict I & O, daily weights.   Repeat CXR  on 2/26 with no significant improvement in PVC. Opacity right lung which may represent right pleural effusion and/or airspace opacity. Nonspecific lucencies overlies the right chest.     CT chest ordered: Moderate to large right and small left pleural effusions with almost complete atelectasis of the right lower lobe secondary to the pleural effusion; right pleural effusion has increased in size and left pleural effusion is new since 1/13/2019.Patchy groundglass opacities in the left lower lobe, either infectious/inflammatory or secondary to pulmonary edema. Additional ground glass attenuation in the lungs, likely pulmonary edema.Diffuse anasarca.Small amount of ascites within the imaged upper abdomen.    CTS consult called for possible thoracentesis, deferred to IR, IR was called for right sided pigtail to be placed today. NPO for the procedure. Thoracentesis labs ordered  Afebrile, no leukocytosis. No PNA suspected.   c/w coreg & imdur.  - Hold Losartan due to hyperkalemia & now KEITH  - ECHO shows EF of > 75%. Moderate to Severe TR, mod-severe PHTN  Cardio follow up appreciated  Pt answers "no" when asked about breathing tube & resuscitation but is intermittently lethargic so cannot make a decision based on her mental status. Tried reaching  at [phone], left VM. Spoke with daughter who first agreed for DNR/DNI but pt's  is her HCP & is unable to make a decision currently. Palliative to have a family meeting today to assist in GOC. Will meet with family    4) Acute on Chronic Hypercapnic Respiratory Failure  - H/O OHS/KYREE   Pulm recommend chronic NIV for chronic hypercarbic respiratory failure to improve symptoms and to decrease hospitalizations; BiPAP was considered and tried without resolution of hypercarbia  - Continue NIV (AVAPS) Nocturnal and PRN. Eventual SINGH with NIV (pt has AVAPS at home which will be taken to SINGH on d/c)  - ICU Consult appreciated.   - Pulmonary input appreciated  - Avoid Narcotics for pain  Low dose steroids added for tight airways, taper off quickly    5) Acute on chronic kidney injury- worsened  Cr 1.1-->1.71--> 1.79 with worsening GFR from 51--->30  ct hold losartan & lasix  NS 250cc given yesterday as discussed with cardio. Will give another 250 today. Need to tolerate some degree of azotemia  f/u BMP in am  - Avoid nephrotoxic medications  - Renal input appreciated    6) CAD stents in 2014 / PAD / HLD / HTN  - Continue, Imdur, Lipitor, Coreg and Norvasc   - Plavix on hold since last admission due to GI Bleed. ASA resumed as per GI, plavix yet on hold    7) DM 2- uncontrolled due to steroids  - HbA1c 5.6  - Accu checks and ISS  - Increase Lantus to 10 units at bedtime, add humalog 3U    8) Anemia  - Multifactorial (chronic, now complicated with acute bleeding)  - Plan as above  - s/p Venofer   - Continue Procrit    9) Transaminitis  - Likely secondary to liver congestion  - Improved    10) History of Depression  - Continue Duloxetine 60 mg    11) IV infiltration LUE  - Arm elevation  - Warm compresses   - No DVT     Elevated BUN- likely due to steroids    Lethargy- ABG ordered, will do CT head if no improvement    DVT Prophylaxis -- heoarin sc resumed, f/u H/H    Dispo: SINGH with THERESA (has at home) when resp stable Pt is a 62 yo presenting to ED w/ SOB 2/2 diastolic HF exacerbation and noted to have KEITH and hyperkalemia, PMH CAD s/p CABG and PCI, PAD s/p baloon angioplasty, DM2, HTN, HLD, hx of cardiac arrest, recent GI bleed last month, Diastolic CHF, legally blind, pleural effusion requiring thoracentesis in Dec 2018.         >Chest pain with hx of CAD & CABG/PCI-  mild TnI elevation likely from demand ischemia & poor clearence. Disccussed with cardio. Will not follow further TnI. c/w Imdur, BB, statin, ASA. Plavix on hold since last admission due to GI Bleed.      1) Lower GI Bleed likely diverticular  - s/p 1 unit of PRBC  - GI Consult appreciated  - s/p Colonoscopy   ~ Poor prep throughout the colon. Diverticulosis seen. Unable to visualize colon sufficiently for poor prep  No further GI w/u recommended, ASA resumed as per GI, plavix yet on hold  Hg increased from 7.8 to 8.0 today, no further reported bleeding. Clear for d/c as per GI  Advanced diet     2) Hyperkalemia  - Hold Losartan    3) Acute on Chronic Diastolic Heart Failure with severe PHTN  & right ventricular failure- now with clinical worsening   - Was initially on Lasix Infusion which is now off  CXR on 2/24 with worsening pulm vasc congestion, increasing BNP,  Demadex 20 mg BID was switched to lasix 40mg IV q12 but is now on hold due to worsening kidney function & metabolic alkalosis from diuresis.   Currently intravascularly volume depleted with 3rd spacing. Will give 250cc IVF & f/u BMP in am as discussed with  Dr. Recio.    Urine output not accurate as pt is incontinent, Strict I & O, daily weights.   Repeat CXR  on 2/26 with no significant improvement in PVC. Opacity right lung which may represent right pleural effusion and/or airspace opacity. Nonspecific lucencies overlies the right chest.     CT chest ordered: Moderate to large right and small left pleural effusions with almost complete atelectasis of the right lower lobe secondary to the pleural effusion; right pleural effusion has increased in size and left pleural effusion is new since 1/13/2019.Patchy groundglass opacities in the left lower lobe, either infectious/inflammatory or secondary to pulmonary edema. Additional ground glass attenuation in the lungs, likely pulmonary edema.Diffuse anasarca.Small amount of ascites within the imaged upper abdomen.    CTS consult called for possible thoracentesis, deferred to IR, IR was called for right sided pigtail to be placed today. NPO for the procedure. Thoracentesis labs ordered  Afebrile, no leukocytosis. No PNA suspected.   c/w coreg & imdur.  - Hold Losartan due to hyperkalemia & now KEITH  - ECHO shows EF of > 75%. Moderate to Severe TR, mod-severe PHTN  Cardio follow up appreciated  Pt answers "no" when asked about breathing tube & resuscitation but is intermittently lethargic so cannot make a decision based on her mental status. Tried reaching  at [phone], left VM. Spoke with daughter who first agreed for DNR/DNI but pt's  is her HCP & is unable to make a decision currently. Palliative to have a family meeting today to assist in GOC. Will meet with family    4) Acute on Chronic Hypercapnic Respiratory Failure  - H/O OHS/KYREE   Pulm recommend chronic NIV for chronic hypercarbic respiratory failure to improve symptoms and to decrease hospitalizations; BiPAP was considered and tried without resolution of hypercarbia  - Continue NIV (AVAPS) Nocturnal and PRN. Eventual SINGH with NIV (pt has AVAPS at home which will be taken to SING[facility] on d/c)  - ICU Consult appreciated.   - Pulmonary input appreciated  - Avoid Narcotics for pain  Low dose steroids added for tight airways, taper off quickly    5) Acute on chronic kidney injury- worsened  Cr 1.1-->1.71--> 1.79 with worsening GFR from 51--->30  ct hold losartan & lasix  NS 250cc given yesterday without improvement. ?Consider Albumin with lasix. Disccussed with renal & cardio, await plan  f/u BMP in am  - Avoid nephrotoxic medications  - Renal input appreciated    6) CAD stents in 2014 / PAD / HLD / HTN  - Continue, Imdur, Lipitor, Coreg and Norvasc   - Plavix on hold since last admission due to GI Bleed. ASA resumed as per GI, plavix yet on hold    7) DM 2- uncontrolled due to steroids  - HbA1c 5.6  - Accu checks and ISS  - Increase Lantus to 10 units at bedtime, add humalog 3U    8) Anemia  - Multifactorial (chronic, now complicated with acute bleeding)  - Plan as above  - s/p Venofer   - Continue Procrit    9) Transaminitis  - Likely secondary to liver congestion  - Improved    10) History of Depression  - Continue Duloxetine 60 mg    11) IV infiltration LUE  - Arm elevation  - Warm compresses   - No DVT     Elevated BUN- likely due to steroids    Lethargy- ABG ordered, will do CT head if no improvement    DVT Prophylaxis -- heoarin sc resumed, f/u H/H    Dispo: SINGH with AVAPS (has at home) when resp stable

## 2019-02-28 NOTE — BRIEF OPERATIVE NOTE - PROCEDURE
<<-----Click on this checkbox to enter Procedure Chest tube placement with US guidance  02/28/2019    Active  SSEQUEIRA

## 2019-02-28 NOTE — GOALS OF CARE CONVERSATION - PERSONAL ADVANCE DIRECTIVE - CONVERSATION DETAILS
Patient with poor mental status- Does NOT have capacity to make decisions about her health.   Discussed current condition - issue of recurrent pleural effusion from heart failure stemming from pulmonary issues of KYREE/OHS.  Likelihood of recurrent pleural effusion, need for pigtail to offload fluid as medical management with diuretics unsuccessful and worsening renal functions.   Daughter states mother has been  through a lot in the last few years. Acknowledged her multiple hospitalizations for same issue and decline.  She states mother will need more help at home with ADLs.  Discussed consideration for hospice services given patient significantly functionally debilitated. Family to think about it.  Discussed advance directives risks and benefits. Daughter states mother had always said  she would NOT want to be resuscitated or be intubated and placed on machines.    and Daughter agree to DNR/I.    All questions answered. Advised  family to speak to  hospitalist and cardiology if any further questions status. Patient with poor mental status- Does NOT have capacity to make decisions about her health.   Discussed current condition - issue of recurrent pleural effusion from heart failure stemming from pulmonary issues of KYREE/OHS.  Likelihood of recurrent pleural effusion, need for pigtail to offload fluid as medical management with diuretics unsuccessful and worsening renal functions.   Daughter states mother has been  through a lot in the last few years. Acknowledged her multiple hospitalizations for same issue and decline.  She states mother will need more help at home with ADLs.  Discussed consideration for hospice services given patient significantly functionally debilitated. Family to think about it.  Discussed advance directives risks and benefits. Daughter states mother had always said  she would NOT want to be resuscitated or be intubated and placed on machines.    and Daughter agree to DNR/I.    All questions answered. Advised  family to speak to  hospitalist and cardiology if any further questions.

## 2019-02-28 NOTE — BRIEF OPERATIVE NOTE - POST-OP DX
Pleural effusion  02/28/2019    Active  Ana, Hilario MCKEON
Colon, diverticulosis  02/22/2019  Poor prep throughout the colon  Active  Maxwell Nava

## 2019-03-01 DIAGNOSIS — R53.2 FUNCTIONAL QUADRIPLEGIA: ICD-10-CM

## 2019-03-01 LAB
ALBUMIN FLD-MCNC: 0.9 G/DL — SIGNIFICANT CHANGE UP
ANION GAP SERPL CALC-SCNC: 8 MMOL/L — SIGNIFICANT CHANGE UP (ref 5–17)
ANISOCYTOSIS BLD QL: SLIGHT — SIGNIFICANT CHANGE UP
APPEARANCE UR: CLEAR — SIGNIFICANT CHANGE UP
BACTERIA # UR AUTO: ABNORMAL
BASE EXCESS BLDA CALC-SCNC: 11 MMOL/L — HIGH (ref -2–2)
BASE EXCESS BLDA CALC-SCNC: 8.1 MMOL/L — HIGH (ref -2–2)
BILIRUB UR-MCNC: NEGATIVE — SIGNIFICANT CHANGE UP
BLOOD GAS COMMENTS ARTERIAL: SIGNIFICANT CHANGE UP
BLOOD GAS COMMENTS ARTERIAL: SIGNIFICANT CHANGE UP
BUN SERPL-MCNC: 101 MG/DL — HIGH (ref 8–20)
CALCIUM SERPL-MCNC: 7.8 MG/DL — LOW (ref 8.6–10.2)
CHLORIDE SERPL-SCNC: 96 MMOL/L — LOW (ref 98–107)
CO2 SERPL-SCNC: 34 MMOL/L — HIGH (ref 22–29)
COLOR SPEC: YELLOW — SIGNIFICANT CHANGE UP
CREAT SERPL-MCNC: 1.84 MG/DL — HIGH (ref 0.5–1.3)
DIFF PNL FLD: ABNORMAL
ELLIPTOCYTES BLD QL SMEAR: SLIGHT — SIGNIFICANT CHANGE UP
EPI CELLS # UR: SIGNIFICANT CHANGE UP
GAS PNL BLDA: SIGNIFICANT CHANGE UP
GLUCOSE BLDC GLUCOMTR-MCNC: 111 MG/DL — HIGH (ref 70–99)
GLUCOSE BLDC GLUCOMTR-MCNC: 111 MG/DL — HIGH (ref 70–99)
GLUCOSE BLDC GLUCOMTR-MCNC: 114 MG/DL — HIGH (ref 70–99)
GLUCOSE BLDC GLUCOMTR-MCNC: 118 MG/DL — HIGH (ref 70–99)
GLUCOSE BLDC GLUCOMTR-MCNC: 126 MG/DL — HIGH (ref 70–99)
GLUCOSE BLDC GLUCOMTR-MCNC: 161 MG/DL — HIGH (ref 70–99)
GLUCOSE BLDC GLUCOMTR-MCNC: 76 MG/DL — SIGNIFICANT CHANGE UP (ref 70–99)
GLUCOSE FLD-MCNC: 182 MG/DL — SIGNIFICANT CHANGE UP
GLUCOSE SERPL-MCNC: 106 MG/DL — SIGNIFICANT CHANGE UP (ref 70–115)
GLUCOSE UR QL: NEGATIVE MG/DL — SIGNIFICANT CHANGE UP
HCO3 BLDA-SCNC: 32 MMOL/L — HIGH (ref 20–26)
HCO3 BLDA-SCNC: 35 MMOL/L — HIGH (ref 20–26)
HCT VFR BLD CALC: 26.2 % — LOW (ref 37–47)
HGB BLD-MCNC: 8.7 G/DL — LOW (ref 12–16)
HOROWITZ INDEX BLDA+IHG-RTO: 0.5 — SIGNIFICANT CHANGE UP
HOROWITZ INDEX BLDA+IHG-RTO: SIGNIFICANT CHANGE UP
HYPOCHROMIA BLD QL: SLIGHT — SIGNIFICANT CHANGE UP
KETONES UR-MCNC: NEGATIVE — SIGNIFICANT CHANGE UP
LDH SERPL L TO P-CCNC: 102 U/L — SIGNIFICANT CHANGE UP
LEUKOCYTE ESTERASE UR-ACNC: ABNORMAL
LYMPHOCYTES # BLD AUTO: 0.3 K/UL — LOW (ref 1–4.8)
LYMPHOCYTES # BLD AUTO: 5 % — LOW (ref 20–55)
MAGNESIUM SERPL-MCNC: 2.7 MG/DL — HIGH (ref 1.8–2.6)
MCHC RBC-ENTMCNC: 29.6 PG — SIGNIFICANT CHANGE UP (ref 27–31)
MCHC RBC-ENTMCNC: 33.2 G/DL — SIGNIFICANT CHANGE UP (ref 32–36)
MCV RBC AUTO: 89.1 FL — SIGNIFICANT CHANGE UP (ref 81–99)
METAMYELOCYTES # FLD: 1 % — HIGH (ref 0–0)
MONOCYTES # BLD AUTO: 0.2 K/UL — SIGNIFICANT CHANGE UP (ref 0–0.8)
MONOCYTES NFR BLD AUTO: 3 % — SIGNIFICANT CHANGE UP (ref 3–10)
NEUTROPHILS # BLD AUTO: 5.6 K/UL — SIGNIFICANT CHANGE UP (ref 1.8–8)
NEUTROPHILS NFR BLD AUTO: 84 % — HIGH (ref 37–73)
NEUTS BAND # BLD: 7 % — SIGNIFICANT CHANGE UP (ref 0–8)
NIGHT BLUE STAIN TISS: SIGNIFICANT CHANGE UP
NITRITE UR-MCNC: NEGATIVE — SIGNIFICANT CHANGE UP
OVALOCYTES BLD QL SMEAR: SLIGHT — SIGNIFICANT CHANGE UP
PCO2 BLDA: 56 MMHG — HIGH (ref 35–45)
PCO2 BLDA: 59 MMHG — HIGH (ref 35–45)
PH BLDA: 7.38 — SIGNIFICANT CHANGE UP (ref 7.35–7.45)
PH BLDA: 7.42 — SIGNIFICANT CHANGE UP (ref 7.35–7.45)
PH UR: 7 — SIGNIFICANT CHANGE UP (ref 5–8)
PHOSPHATE SERPL-MCNC: 3.7 MG/DL — SIGNIFICANT CHANGE UP (ref 2.4–4.7)
PLAT MORPH BLD: NORMAL — SIGNIFICANT CHANGE UP
PLATELET # BLD AUTO: 176 K/UL — SIGNIFICANT CHANGE UP (ref 150–400)
PO2 BLDA: 105 MMHG — SIGNIFICANT CHANGE UP (ref 83–108)
PO2 BLDA: 64 MMHG — LOW (ref 83–108)
POIKILOCYTOSIS BLD QL AUTO: SLIGHT — SIGNIFICANT CHANGE UP
POTASSIUM SERPL-MCNC: 5.3 MMOL/L — SIGNIFICANT CHANGE UP (ref 3.5–5.3)
POTASSIUM SERPL-SCNC: 5.3 MMOL/L — SIGNIFICANT CHANGE UP (ref 3.5–5.3)
PROT FLD-MCNC: 2.2 G/DL — SIGNIFICANT CHANGE UP
PROT UR-MCNC: 100 MG/DL
RBC # BLD: 2.94 M/UL — LOW (ref 4.4–5.2)
RBC # FLD: 16.4 % — HIGH (ref 11–15.6)
RBC BLD AUTO: ABNORMAL
RBC CASTS # UR COMP ASSIST: ABNORMAL /HPF (ref 0–4)
SAO2 % BLDA: 93 % — LOW (ref 95–99)
SAO2 % BLDA: 99 % — SIGNIFICANT CHANGE UP (ref 95–99)
SCHISTOCYTES BLD QL AUTO: SLIGHT — SIGNIFICANT CHANGE UP
SODIUM SERPL-SCNC: 138 MMOL/L — SIGNIFICANT CHANGE UP (ref 135–145)
SP GR SPEC: 1.01 — SIGNIFICANT CHANGE UP (ref 1.01–1.02)
SPECIMEN SOURCE: SIGNIFICANT CHANGE UP
TARGETS BLD QL SMEAR: SIGNIFICANT CHANGE UP
TROPONIN T SERPL-MCNC: 0.1 NG/ML — HIGH (ref 0–0.06)
UROBILINOGEN FLD QL: 1 MG/DL
WBC # BLD: 6.2 K/UL — SIGNIFICANT CHANGE UP (ref 4.8–10.8)
WBC # FLD AUTO: 6.2 K/UL — SIGNIFICANT CHANGE UP (ref 4.8–10.8)
WBC UR QL: >50

## 2019-03-01 PROCEDURE — 71045 X-RAY EXAM CHEST 1 VIEW: CPT | Mod: 26

## 2019-03-01 PROCEDURE — 99233 SBSQ HOSP IP/OBS HIGH 50: CPT

## 2019-03-01 PROCEDURE — 99223 1ST HOSP IP/OBS HIGH 75: CPT

## 2019-03-01 PROCEDURE — 99232 SBSQ HOSP IP/OBS MODERATE 35: CPT

## 2019-03-01 PROCEDURE — 93010 ELECTROCARDIOGRAM REPORT: CPT

## 2019-03-01 RX ORDER — CEFTRIAXONE 500 MG/1
INJECTION, POWDER, FOR SOLUTION INTRAMUSCULAR; INTRAVENOUS
Qty: 0 | Refills: 0 | Status: DISCONTINUED | OUTPATIENT
Start: 2019-03-01 | End: 2019-03-04

## 2019-03-01 RX ORDER — MIDODRINE HYDROCHLORIDE 2.5 MG/1
5 TABLET ORAL EVERY 8 HOURS
Qty: 0 | Refills: 0 | Status: DISCONTINUED | OUTPATIENT
Start: 2019-03-01 | End: 2019-03-02

## 2019-03-01 RX ORDER — MIDODRINE HYDROCHLORIDE 2.5 MG/1
10 TABLET ORAL EVERY 8 HOURS
Qty: 0 | Refills: 0 | Status: DISCONTINUED | OUTPATIENT
Start: 2019-03-01 | End: 2019-03-01

## 2019-03-01 RX ORDER — BUMETANIDE 0.25 MG/ML
0.5 INJECTION INTRAMUSCULAR; INTRAVENOUS
Qty: 20 | Refills: 0 | Status: DISCONTINUED | OUTPATIENT
Start: 2019-03-01 | End: 2019-03-03

## 2019-03-01 RX ORDER — CEFTRIAXONE 500 MG/1
1 INJECTION, POWDER, FOR SOLUTION INTRAMUSCULAR; INTRAVENOUS EVERY 24 HOURS
Qty: 0 | Refills: 0 | Status: DISCONTINUED | OUTPATIENT
Start: 2019-03-02 | End: 2019-03-04

## 2019-03-01 RX ORDER — CEFTRIAXONE 500 MG/1
1 INJECTION, POWDER, FOR SOLUTION INTRAMUSCULAR; INTRAVENOUS ONCE
Qty: 0 | Refills: 0 | Status: COMPLETED | OUTPATIENT
Start: 2019-03-01 | End: 2019-03-01

## 2019-03-01 RX ADMIN — Medication 125 MILLIGRAM(S): at 10:35

## 2019-03-01 RX ADMIN — Medication 3 MILLILITER(S): at 15:02

## 2019-03-01 RX ADMIN — Medication 3 MILLILITER(S): at 20:53

## 2019-03-01 RX ADMIN — HEPARIN SODIUM 5000 UNIT(S): 5000 INJECTION INTRAVENOUS; SUBCUTANEOUS at 09:46

## 2019-03-01 RX ADMIN — BUMETANIDE 2.5 MG/HR: 0.25 INJECTION INTRAMUSCULAR; INTRAVENOUS at 10:48

## 2019-03-01 RX ADMIN — INSULIN GLARGINE 10 UNIT(S): 100 INJECTION, SOLUTION SUBCUTANEOUS at 21:44

## 2019-03-01 RX ADMIN — CEFTRIAXONE 100 GRAM(S): 500 INJECTION, POWDER, FOR SOLUTION INTRAMUSCULAR; INTRAVENOUS at 12:24

## 2019-03-01 RX ADMIN — Medication 3 MILLILITER(S): at 08:21

## 2019-03-01 RX ADMIN — HEPARIN SODIUM 5000 UNIT(S): 5000 INJECTION INTRAVENOUS; SUBCUTANEOUS at 18:00

## 2019-03-01 RX ADMIN — Medication 3 MILLILITER(S): at 05:55

## 2019-03-01 NOTE — PROGRESS NOTE ADULT - SUBJECTIVE AND OBJECTIVE BOX
NEPHROLOGY INTERVAL HPI/OVERNIGHT EVENTS:    Transferred to MICU after rapid response with lethargy.      MEDICATIONS  (STANDING):  ALBUTerol    0.083% 10 milliGRAM(s) Nebulizer once  ALBUTerol/ipratropium for Nebulization 3 milliLiter(s) Nebulizer every 6 hours  amLODIPine   Tablet 10 milliGRAM(s) Oral daily  aspirin enteric coated 81 milliGRAM(s) Oral daily  atorvastatin 80 milliGRAM(s) Oral at bedtime  carvedilol 25 milliGRAM(s) Oral every 12 hours  dextrose 5%. 1000 milliLiter(s) (50 mL/Hr) IV Continuous <Continuous>  dextrose 50% Injectable 12.5 Gram(s) IV Push once  dextrose 50% Injectable 25 Gram(s) IV Push once  dextrose 50% Injectable 25 Gram(s) IV Push once  docusate sodium 100 milliGRAM(s) Oral two times a day  DULoxetine 60 milliGRAM(s) Oral daily  epoetin sara Injectable 42314 Unit(s) SubCutaneous <User Schedule>  gabapentin 100 milliGRAM(s) Oral three times a day  heparin  Injectable 5000 Unit(s) SubCutaneous every 12 hours  insulin glargine Injectable (LANTUS) 10 Unit(s) SubCutaneous at bedtime  insulin lispro (HumaLOG) corrective regimen sliding scale   SubCutaneous three times a day before meals  insulin lispro Injectable (HumaLOG) 3 Unit(s) SubCutaneous three times a day with meals  isosorbide   mononitrate ER Tablet (IMDUR) 60 milliGRAM(s) Oral daily  magnesium oxide 400 milliGRAM(s) Oral daily  pantoprazole    Tablet 40 milliGRAM(s) Oral before breakfast  polyethylene glycol 3350 17 Gram(s) Oral daily  predniSONE   Tablet   Oral   senna 2 Tablet(s) Oral at bedtime    MEDICATIONS  (PRN):  acetaminophen   Tablet .. 650 milliGRAM(s) Oral every 6 hours PRN Mild Pain (1 - 3)  dextrose 40% Gel 15 Gram(s) Oral once PRN Blood Glucose LESS THAN 70 milliGRAM(s)/deciliter  glucagon  Injectable 1 milliGRAM(s) IntraMuscular once PRN Glucose LESS THAN 70 milligrams/deciliter  guaiFENesin    Syrup 100 milliGRAM(s) Oral every 6 hours PRN Cough  ondansetron Injectable 4 milliGRAM(s) IV Push every 6 hours PRN Nausea and/or Vomiting      Allergies    Accupril (Other)          Vital Signs Last 24 HrsICU     T(C): 35.7 (01 Mar 2019 08:00), Max: 36.9 (28 Feb 2019 23:24)  T(F): 96.3 (01 Mar 2019 08:00), Max: 98.5 (28 Feb 2019 23:24)  HR: 62 (01 Mar 2019 09:44) (56 - 65)  BP: 129/65 (01 Mar 2019 09:27) (100/- - 139/63)  BP(mean): 91 (01 Mar 2019 09:27) (76 - 91)  ABP: --  ABP(mean): --  RR: 17 (01 Mar 2019 09:44) (14 - 20)  SpO2: 93% (01 Mar 2019 09:44) (83% - 100%)    T(C): 36.9 (28 Feb 2019 07:48), Max: 36.9 (28 Feb 2019 07:48)  T(F): 98.4 (28 Feb 2019 07:48), Max: 98.4 (28 Feb 2019 07:48)  HR: 62 (28 Feb 2019 07:48) (55 - 62)  BP: 130/74 (28 Feb 2019 07:48) (127/56 - 138/68)  BP(mean): --  RR: 19 (28 Feb 2019 07:48) (18 - 19)  SpO2: 98% (28 Feb 2019 08:36) (96% - 100%)          PHYSICAL EXAM:    HEENT blind right eye  02 by mask  Lungs right chest tube, poor air motion, no wheeze  CVS no rub  Abdomin obese  Ext. Edema   no fontenot but bladder scan shows 700 cc urine in bladder    LABS: today's labs pending.                        8.1    4.5   )-----------( 166      ( 28 Feb 2019 08:27 )             24.8     02-28    137  |  95<L>  |  96.0<H>  ----------------------------<  192<H>  5.2   |  35.0<H>  |  1.79<H>    Ca    8.3<L>      28 Feb 2019 08:27  Phos  3.2     02-28  Mg     2.7     02-28    TPro  7.0  /  Alb  x   /  TBili  x   /  DBili  x   /  AST  x   /  ALT  x   /  AlkPhos  x   02-28        Magnesium, Serum: 2.7 mg/dL (02-28 @ 08:27)  Phosphorus Level, Serum: 3.2 mg/dL (02-28 @ 08:27)    ABG - ( 27 Feb 2019 12:01 )  pH, Arterial: 7.36  pH, Blood: x     /  pCO2: 66    /  pO2: 76    / HCO3: 34    / Base Excess: 10.4  /  SaO2: 96                    RADIOLOGY & ADDITIONAL TESTS:

## 2019-03-01 NOTE — CHART NOTE - NSCHARTNOTEFT_GEN_A_CORE
Patient was moved to CT ICU for boarding as there is no 4BRK beds.  Patient BP responded after 2L IVF resuscitation.      BP is now 119/63  HR 62    Patient is more awake and responsive.  Patient was likely intravascularly depleted which led to decreased cerebral perfusion and eventual hypoventilation. Will continue on BiPAP at this time.   arrived and confirmed DNR/DNI status but is open to pressors if necessary.  MICU was consulted to evaluate the patient as well if decompensates.    Continue management per primary team.    BRAEDEN PGY3

## 2019-03-01 NOTE — PROGRESS NOTE ADULT - SUBJECTIVE AND OBJECTIVE BOX
PULMONARY PROGRESS NOTE      RANJIT ROCHA-[id number]    Patient is a 63y old  Female who presents with a chief complaint of SOB (01 Mar 2019 10:18)      INTERVAL HPI/OVERNIGHT EVENTS: Above events noted. Now on BPAP; currently continuous. Awake and alert. No resp distress. Pigtail chest tube in R.     MEDICATIONS  (STANDING):  ALBUTerol    0.083% 10 milliGRAM(s) Nebulizer once  ALBUTerol/ipratropium for Nebulization 3 milliLiter(s) Nebulizer every 6 hours  amLODIPine   Tablet 10 milliGRAM(s) Oral daily  aspirin enteric coated 81 milliGRAM(s) Oral daily  atorvastatin 80 milliGRAM(s) Oral at bedtime  buMETAnide Infusion 0.5 mG/Hr (2.5 mL/Hr) IV Continuous <Continuous>  carvedilol 25 milliGRAM(s) Oral every 12 hours  cefTRIAXone   IVPB      cefTRIAXone   IVPB 1 Gram(s) IV Intermittent once  dextrose 5%. 1000 milliLiter(s) (50 mL/Hr) IV Continuous <Continuous>  dextrose 50% Injectable 12.5 Gram(s) IV Push once  dextrose 50% Injectable 25 Gram(s) IV Push once  dextrose 50% Injectable 25 Gram(s) IV Push once  docusate sodium 100 milliGRAM(s) Oral two times a day  DULoxetine 60 milliGRAM(s) Oral daily  epoetin sara Injectable 25471 Unit(s) SubCutaneous <User Schedule>  gabapentin 100 milliGRAM(s) Oral three times a day  heparin  Injectable 5000 Unit(s) SubCutaneous every 12 hours  insulin glargine Injectable (LANTUS) 10 Unit(s) SubCutaneous at bedtime  insulin lispro (HumaLOG) corrective regimen sliding scale   SubCutaneous three times a day before meals  isosorbide   mononitrate ER Tablet (IMDUR) 60 milliGRAM(s) Oral daily  magnesium oxide 400 milliGRAM(s) Oral daily  pantoprazole    Tablet 40 milliGRAM(s) Oral before breakfast  polyethylene glycol 3350 17 Gram(s) Oral daily  senna 2 Tablet(s) Oral at bedtime      MEDICATIONS  (PRN):  acetaminophen   Tablet .. 650 milliGRAM(s) Oral every 6 hours PRN Mild Pain (1 - 3)  dextrose 40% Gel 15 Gram(s) Oral once PRN Blood Glucose LESS THAN 70 milliGRAM(s)/deciliter  glucagon  Injectable 1 milliGRAM(s) IntraMuscular once PRN Glucose LESS THAN 70 milligrams/deciliter  guaiFENesin    Syrup 100 milliGRAM(s) Oral every 6 hours PRN Cough  ondansetron Injectable 4 milliGRAM(s) IV Push every 6 hours PRN Nausea and/or Vomiting      Allergies    Accupril (Other)    Intolerances        PAST MEDICAL & SURGICAL HISTORY:  Herniated disc, cervical  PAD (peripheral artery disease)  Legally blind  History of peripheral vascular disease  History of retinal detachment  History of CEA (carotid endarterectomy): Right  Hyperlipidemia  Glaucoma  Hypertension  Diabetes  S/P CABG x 1  After cataract  Uveitic glaucoma of both eyes  Detached retina  Atherosclerosis of right carotid artery   delivery delivered             REVIEW OF SYSTEMS:    CONSTITUTIONAL:  see above    HEENT:  Eyes:  No diplopia or blurred vision. ENT:  No earache, sore throat or runny nose.    CARDIOVASCULAR:  No pressure, squeezing, tightness, heaviness or aching about the chest; no palpitations.    RESPIRATORY:  per HPI     GASTROINTESTINAL:  No nausea, vomiting or diarrhea.    GENITOURINARY:  No dysuria, frequency or urgency.    NEUROLOGIC:  No paresthesias, fasciculations, seizures or weakness.    PSYCHIATRIC:  anxious    Vital Signs Last 24 Hrs  T(C): 36.1 (01 Mar 2019 11:00), Max: 36.9 (2019 23:24)  T(F): 97 (01 Mar 2019 11:00), Max: 98.5 (2019 23:24)  HR: 64 (01 Mar 2019 11:09) (56 - 65)  BP: 121/60 (01 Mar 2019 11:00) (100/- - 139/63)  BP(mean): 84 (01 Mar 2019 11:00) (76 - 91)  RR: 15 (01 Mar 2019 11:09) (14 - 20)  SpO2: 93% (01 Mar 2019 11:09) (83% - 100%)    PHYSICAL EXAMINATION:    GENERAL: The patient is awake and alert; on bpap.     HEENT: Head is normocephalic and atraumatic.      NECK: Supple.    LUNGS: b/l rales, wheeze, respirations unlabored    HEART: Regular rate and rhythm      ABDOMEN: Soft, nontender, and obese      EXTREMITIES: Without any cyanosis, clubbing, rash, lesions or edema.    NEUROLOGIC: Grossly intact.    LABS:                        8.7    6.2   )-----------( 176      ( 01 Mar 2019 09:30 )             26.2         138  |  96<L>  |  101.0<H>  ----------------------------<  106  5.3   |  34.0<H>  |  1.84<H>    Ca    7.8<L>      01 Mar 2019 09:30  Phos  3.7       Mg     2.7         TPro  7.0  /  Alb  x   /  TBili  x   /  DBili  x   /  AST  x   /  ALT  x   /  AlkPhos  x              ABG - ( 01 Mar 2019 10:46 )  pH, Arterial: 7.38  pH, Blood: x     /  pCO2: 59    /  pO2: 64    / HCO3: 32    / Base Excess: 8.1   /  SaO2: 93                CARDIAC MARKERS ( 01 Mar 2019 09:30 )  x     / 0.10 ng/mL / x     / x     / x      CARDIAC MARKERS ( 2019 13:02 )  x     / 0.09 ng/mL / x     / x     / x            Serum Pro-Brain Natriuretic Peptide: 8895 pg/mL (19 @ 13:02)          MICROBIOLOGY:  Culture - Body Fluid with Gram Stain (19 @ 12:36)    Gram Stain:   No White blood cells  No organisms seen    Specimen Source: .Body Fluid    Culture Results:   No growth at 1 day.  Culture in progress        RADIOLOGY & ADDITIONAL STUDIES:  < from: Xray Chest 1 View- PORTABLE-Routine (19 @ 05:46) >     EXAM:  XR CHEST PORTABLE ROUTINE 1V                          PROCEDURE DATE:  2019          INTERPRETATION:  History: Follow-up of pleural effusion and right chest   tube    Technique:  AP portable    Comparisons:  Chest x-ray dated 2019    Findings:     Diffuse increase in bilateral pulmonary edema. No   significant pleural fluid pigtail catheter is coiled in the right lower   lung field.  . Cardiomegaly. Cannot exclude pericardial effusion. Median   sternotomy.    Impression: Diffuse increase in pulmonary edema.    Enlarged cardiac silhouette. Cannot exclude pericardial effusion                ROSA OLIVEROS M.D., ATTENDING RADIOLOGIST    < end of copied text >

## 2019-03-01 NOTE — CONSULT NOTE ADULT - ATTENDING COMMENTS
Extensive time spent counseling family and coordinating care with other team members.
Pt seen and imaging reviewed.  Rt effusion s/p chest tube.  Will follow

## 2019-03-01 NOTE — PROGRESS NOTE ADULT - SUBJECTIVE AND OBJECTIVE BOX
CHIEF COMPLAINT/INTERVAL HISTORY:    Patient is a 63y old  Female who presents with a chief complaint of SOB (01 Mar 2019 10:06)      HPI:  Pt is a 62 yo presenting to ED w/ SOB, PMH CAD s/p CABG and PCI, PAD s/p baloon angioplasty, DM2, HTN, HLD, hx of cardiac arrest, recent GI bleed last month, Diastolic CHF, legally blind, pleural effusion requiring thoracentesis in Dec 2018.   Patient states that she has been having increasing SOB over the last week, she lives at home with her family, denies sick contacts, but has been having more exertional dyspnea. She states she walks with a walker but it is taking less activity for her dyspnea to occur. She has been compliant with all of her medications, she does not recall the names, but states that there have been no changes to her medication regimen since her discharge. She denies any associated chest pain or pressure. She has been taking her tosemide she believes. Denies PND or orthopnea, but she has noted increased LE edema.   Denies headaches, nausea, vomiting, chest pain, palpitations, abdominal pain, constipation, diarrhea, melena, hematochezia, dysuria.   Her SOB has since resolved. She is breathing comfortably, has no complaints.  In ED patient was noted to have hyperkalemia on repeat labs, troponin elevated at 0.09. Her creatinine is also elevated. She did not receive any potassium treatment initially so I ordered Calcium gluc, kayexelate, Insulin, dextrose, Albuterol. EKG without widened QRS or peaked T waves. (11 Feb 2019 03:58)      SUBJECTIVE & OBJECTIVE/ ROS: Pt seen and examined at bedside.     ICU Vital Signs Last 24 Hrs  T(C): 35.7 (01 Mar 2019 08:00), Max: 36.9 (28 Feb 2019 23:24)  T(F): 96.3 (01 Mar 2019 08:00), Max: 98.5 (28 Feb 2019 23:24)  HR: 62 (01 Mar 2019 09:44) (56 - 65)  BP: 129/65 (01 Mar 2019 09:27) (100/- - 139/63)  BP(mean): 91 (01 Mar 2019 09:27) (76 - 91)  ABP: --  ABP(mean): --  RR: 17 (01 Mar 2019 09:44) (14 - 20)  SpO2: 93% (01 Mar 2019 09:44) (83% - 100%)        MEDICATIONS  (STANDING):  ALBUTerol    0.083% 10 milliGRAM(s) Nebulizer once  ALBUTerol/ipratropium for Nebulization 3 milliLiter(s) Nebulizer every 6 hours  amLODIPine   Tablet 10 milliGRAM(s) Oral daily  aspirin enteric coated 81 milliGRAM(s) Oral daily  atorvastatin 80 milliGRAM(s) Oral at bedtime  buMETAnide Infusion 0.5 mG/Hr (2.5 mL/Hr) IV Continuous <Continuous>  carvedilol 25 milliGRAM(s) Oral every 12 hours  dextrose 5%. 1000 milliLiter(s) (50 mL/Hr) IV Continuous <Continuous>  dextrose 50% Injectable 12.5 Gram(s) IV Push once  dextrose 50% Injectable 25 Gram(s) IV Push once  dextrose 50% Injectable 25 Gram(s) IV Push once  docusate sodium 100 milliGRAM(s) Oral two times a day  DULoxetine 60 milliGRAM(s) Oral daily  epoetin sara Injectable 12394 Unit(s) SubCutaneous <User Schedule>  gabapentin 100 milliGRAM(s) Oral three times a day  heparin  Injectable 5000 Unit(s) SubCutaneous every 12 hours  insulin glargine Injectable (LANTUS) 10 Unit(s) SubCutaneous at bedtime  insulin lispro (HumaLOG) corrective regimen sliding scale   SubCutaneous three times a day before meals  isosorbide   mononitrate ER Tablet (IMDUR) 60 milliGRAM(s) Oral daily  magnesium oxide 400 milliGRAM(s) Oral daily  methylPREDNISolone sodium succinate Injectable 125 milliGRAM(s) IV Push once  pantoprazole    Tablet 40 milliGRAM(s) Oral before breakfast  polyethylene glycol 3350 17 Gram(s) Oral daily  senna 2 Tablet(s) Oral at bedtime    MEDICATIONS  (PRN):  acetaminophen   Tablet .. 650 milliGRAM(s) Oral every 6 hours PRN Mild Pain (1 - 3)  dextrose 40% Gel 15 Gram(s) Oral once PRN Blood Glucose LESS THAN 70 milliGRAM(s)/deciliter  glucagon  Injectable 1 milliGRAM(s) IntraMuscular once PRN Glucose LESS THAN 70 milligrams/deciliter  guaiFENesin    Syrup 100 milliGRAM(s) Oral every 6 hours PRN Cough  ondansetron Injectable 4 milliGRAM(s) IV Push every 6 hours PRN Nausea and/or Vomiting      LABS:                        8.7    6.2   )-----------( 176      ( 01 Mar 2019 09:30 )             26.2     03-01    138  |  96<L>  |  101.0<H>  ----------------------------<  106  5.3   |  34.0<H>  |  1.84<H>    Ca    7.8<L>      01 Mar 2019 09:30  Phos  3.7     03-01  Mg     2.7     03-01    TPro  7.0  /  Alb  x   /  TBili  x   /  DBili  x   /  AST  x   /  ALT  x   /  AlkPhos  x   02-28          CAPILLARY BLOOD GLUCOSE      POCT Blood Glucose.: 111 mg/dL (01 Mar 2019 08:48)  POCT Blood Glucose.: 161 mg/dL (01 Mar 2019 04:56)  POCT Blood Glucose.: 126 mg/dL (01 Mar 2019 04:31)  POCT Blood Glucose.: 76 mg/dL (01 Mar 2019 03:58)  POCT Blood Glucose.: 150 mg/dL (28 Feb 2019 15:50)  POCT Blood Glucose.: 153 mg/dL (28 Feb 2019 13:10)      RECENT CULTURES:      RADIOLOGY & ADDITIONAL TESTS:      PHYSICAL EXAM:    GENERAL: NAD, well-groomed, well-developed  HEAD:  Atraumatic, Normocephalic  EYES: EOMI, PERRLA, conjunctiva and sclera clear  ENMT: Moist mucous membranes  NECK: Supple, No JVD  NERVOUS SYSTEM:  Alert & Oriented X3, Motor Strength 5/5 B/L upper and lower extremities; DTRs 2+ intact and symmetric  CHEST/LUNG: Clear to auscultation bilaterally; No rales, rhonchi, wheezing, or rubs  HEART: Regular rate and rhythm; No murmurs, rubs, or gallops  ABDOMEN: Soft, Nontender, Nondistended; Bowel sounds present  EXTREMITIES:  2+ Peripheral Pulses, No clubbing, cyanosis, or edema        DVT/GI ppx  Discussed with pt @ bedside CHIEF COMPLAINT/INTERVAL HISTORY:    Patient is a 63y old  Female who presents with a chief complaint of SOB (01 Mar 2019 10:06)      HPI:  Pt is a 62 yo presenting to ED w/ SOB, PMH CAD s/p CABG and PCI, PAD s/p baloon angioplasty, DM2, HTN, HLD, hx of cardiac arrest, recent GI bleed last month, Diastolic CHF, legally blind, pleural effusion requiring thoracentesis in Dec 2018.   Patient states that she has been having increasing SOB over the last week, she lives at home with her family, denies sick contacts, but has been having more exertional dyspnea. She states she walks with a walker but it is taking less activity for her dyspnea to occur. She has been compliant with all of her medications, she does not recall the names, but states that there have been no changes to her medication regimen since her discharge. She denies any associated chest pain or pressure. She has been taking her tosemide she believes. Denies PND or orthopnea, but she has noted increased LE edema.   Denies headaches, nausea, vomiting, chest pain, palpitations, abdominal pain, constipation, diarrhea, melena, hematochezia, dysuria.   Her SOB has since resolved. She is breathing comfortably, has no complaints.  In ED patient was noted to have hyperkalemia on repeat labs, troponin elevated at 0.09. Her creatinine is also elevated. She did not receive any potassium treatment initially so I ordered Calcium gluc, kayexelate, Insulin, dextrose, Albuterol. EKG without widened QRS or peaked T waves. (11 Feb 2019 03:58)      SUBJECTIVE & OBJECTIVE/ ROS: Pt seen and examined at bedside. RRT noted this am. Pt became hypotensive after bumex with albumin was given. Pt became increasingly lethargic with ABG showing resp acidosis with hypoxia. CXR with worsening plum edema b/l. Chest tube on right draining 1.1L overnight. Bladder scan showing 700cc urine. Russo placed.  at bedside. Pt more awake now but on BiPAP. BP SBP in 110's. Temp 96.6. Warming blanket in place.     ICU Vital Signs Last 24 Hrs  T(C): 35.7 (01 Mar 2019 08:00), Max: 36.9 (28 Feb 2019 23:24)  T(F): 96.3 (01 Mar 2019 08:00), Max: 98.5 (28 Feb 2019 23:24)  HR: 62 (01 Mar 2019 09:44) (56 - 65)  BP: 129/65 (01 Mar 2019 09:27) (100/- - 139/63)  BP(mean): 91 (01 Mar 2019 09:27) (76 - 91)  ABP: --  ABP(mean): --  RR: 17 (01 Mar 2019 09:44) (14 - 20)  SpO2: 93% (01 Mar 2019 09:44) (83% - 100%)        MEDICATIONS  (STANDING):  ALBUTerol    0.083% 10 milliGRAM(s) Nebulizer once  ALBUTerol/ipratropium for Nebulization 3 milliLiter(s) Nebulizer every 6 hours  amLODIPine   Tablet 10 milliGRAM(s) Oral daily  aspirin enteric coated 81 milliGRAM(s) Oral daily  atorvastatin 80 milliGRAM(s) Oral at bedtime  buMETAnide Infusion 0.5 mG/Hr (2.5 mL/Hr) IV Continuous <Continuous>  carvedilol 25 milliGRAM(s) Oral every 12 hours  dextrose 5%. 1000 milliLiter(s) (50 mL/Hr) IV Continuous <Continuous>  dextrose 50% Injectable 12.5 Gram(s) IV Push once  dextrose 50% Injectable 25 Gram(s) IV Push once  dextrose 50% Injectable 25 Gram(s) IV Push once  docusate sodium 100 milliGRAM(s) Oral two times a day  DULoxetine 60 milliGRAM(s) Oral daily  epoetin sara Injectable 57119 Unit(s) SubCutaneous <User Schedule>  gabapentin 100 milliGRAM(s) Oral three times a day  heparin  Injectable 5000 Unit(s) SubCutaneous every 12 hours  insulin glargine Injectable (LANTUS) 10 Unit(s) SubCutaneous at bedtime  insulin lispro (HumaLOG) corrective regimen sliding scale   SubCutaneous three times a day before meals  isosorbide   mononitrate ER Tablet (IMDUR) 60 milliGRAM(s) Oral daily  magnesium oxide 400 milliGRAM(s) Oral daily  methylPREDNISolone sodium succinate Injectable 125 milliGRAM(s) IV Push once  pantoprazole    Tablet 40 milliGRAM(s) Oral before breakfast  polyethylene glycol 3350 17 Gram(s) Oral daily  senna 2 Tablet(s) Oral at bedtime    MEDICATIONS  (PRN):  acetaminophen   Tablet .. 650 milliGRAM(s) Oral every 6 hours PRN Mild Pain (1 - 3)  dextrose 40% Gel 15 Gram(s) Oral once PRN Blood Glucose LESS THAN 70 milliGRAM(s)/deciliter  glucagon  Injectable 1 milliGRAM(s) IntraMuscular once PRN Glucose LESS THAN 70 milligrams/deciliter  guaiFENesin    Syrup 100 milliGRAM(s) Oral every 6 hours PRN Cough  ondansetron Injectable 4 milliGRAM(s) IV Push every 6 hours PRN Nausea and/or Vomiting      LABS:                        8.7    6.2   )-----------( 176      ( 01 Mar 2019 09:30 )             26.2     03-01    138  |  96<L>  |  101.0<H>  ----------------------------<  106  5.3   |  34.0<H>  |  1.84<H>    Ca    7.8<L>      01 Mar 2019 09:30  Phos  3.7     03-01  Mg     2.7     03-01    TPro  7.0  /  Alb  x   /  TBili  x   /  DBili  x   /  AST  x   /  ALT  x   /  AlkPhos  x   02-28          CAPILLARY BLOOD GLUCOSE      POCT Blood Glucose.: 111 mg/dL (01 Mar 2019 08:48)  POCT Blood Glucose.: 161 mg/dL (01 Mar 2019 04:56)  POCT Blood Glucose.: 126 mg/dL (01 Mar 2019 04:31)  POCT Blood Glucose.: 76 mg/dL (01 Mar 2019 03:58)  POCT Blood Glucose.: 150 mg/dL (28 Feb 2019 15:50)  POCT Blood Glucose.: 153 mg/dL (28 Feb 2019 13:10)      RECENT CULTURES:      RADIOLOGY & ADDITIONAL TESTS:      PHYSICAL EXAM:    GENERAL: mod resp distress, on BiPAP , morbidly obese  HEAD:  Atraumatic, Normocephalic  EYES: EOMI,  Opaque right cornea.  ENMT: Moist mucous membranes  NECK: Supple, No JVD  NERVOUS SYSTEM:  lethargic but arousable, answers questions, non focal  CHEST/LUNG: decreased BS b/l; No rales, rhonchi, wheezing, or rubs  HEART: Regular rate and rhythm; No murmurs, rubs, or gallops  Abd: soft, NT, ND, +BS, +Anasarca  Ext: 2+ pedal edema. No calf tenderness. 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 Dry scab on right heel. Old healed scars on left leg.  b/l ++UE swelling, LUE midline  Skin: Warm and Dry  Psychiatry: anxious

## 2019-03-01 NOTE — PROGRESS NOTE ADULT - ASSESSMENT
Pt is a 62 yo presenting to ED w/ SOB 2/2 diastolic HF exacerbation and noted to have KEITH and hyperkalemia, PMH CAD s/p CABG and PCI, PAD s/p baloon angioplasty, DM2, HTN, HLD, hx of cardiac arrest, recent GI bleed last month, Diastolic CHF, legally blind, pleural effusion requiring thoracentesis in Dec 2018.         >Chest pain with hx of CAD & CABG/PCI-  mild TnI elevation likely from demand ischemia & poor clearence. Disccussed with cardio. Will not follow further TnI. c/w Imdur, BB, statin, ASA. Plavix on hold since last admission due to GI Bleed.      1) Lower GI Bleed likely diverticular  - s/p 1 unit of PRBC  - GI Consult appreciated  - s/p Colonoscopy   ~ Poor prep throughout the colon. Diverticulosis seen. Unable to visualize colon sufficiently for poor prep  No further GI w/u recommended, ASA resumed as per GI, plavix yet on hold  Hg increased from 7.8 to 8.0 today, no further reported bleeding. Clear for d/c as per GI  Advanced diet     2) Hyperkalemia  - Hold Losartan    3) Acute on Chronic Diastolic Heart Failure with severe PHTN  & right ventricular failure from OHS/ KYREE- now with pulm edema & pleural effusions  - Was initially on Lasix Infusion which is now off  c/w coreg & imdur.  - Hold Losartan due to hyperkalemia & now KEITH  - ECHO shows EF of > 75%. Moderate to Severe TR, mod-severe PHTN  Cardio follow up appreciated  CXR on 2/24 with worsening pulm vasc congestion, increasing BNP,  Demadex 20 mg BID was switched to lasix 40mg IV q12 but was held for few days due to worsening kidney function & metabolic alkalosis from diuresis.     Repeat CXR  on 2/26 with no significant improvement in PVC. Opacity right lung which may represent right pleural effusion and/or airspace opacity. Nonspecific lucencies overlies the right chest.     CT chest ordered: Moderate to large right and small left pleural effusions with almost complete atelectasis of the right lower lobe secondary to the pleural effusion; right pleural effusion has increased in size and left pleural effusion is new since 1/13/2019.Patchy groundglass opacities in the left lower lobe, either infectious/inflammatory or secondary to pulmonary edema. Additional ground glass attenuation in the lungs, likely pulmonary edema.Diffuse anasarca.Small amount of ascites within the imaged upper abdomen.    Lasix yet on hold as per cardio due to worsening kidney function    CTS consult called for possible thoracentesis, s/p right sided pigtail by IR. Fluid analysis consistent with transudate      Currently intravascularly volume depleted with 3rd spacing. Extremely labile to fluid shifts. Becomes hypotensive when given diuretics with albumin but also with worsening pulm edema & pleural effusions.     RRT noted on 3/1 for hypotension & lethargy. Pt became hypotensive after bumex with albumin was given. Bumex on hold with IVF given by RRT team. SBP now in 110's so IVF d/em. CXR with worsening plum edema b/l. BiPAP placed. Bumex gtt ordered by renal. ICU consult called for vasopressor support during IV diuresis.   She became increasingly lethargic with ABG showing resp acidosis with hypoxia. Pt has been refusing AVAPS at night as per RT.  PO steroids changed to IV steroids.  Pulm called for re-consult. Pt has been on 4hrs of BiPAP. Repeat ABG ordered.   Chest tube on right draining 1.1L overnight. Maintain chest tube per CTS  Bladder scan showing 700cc urine. Russo placed.    at bedside, updated about recent events & pt's worsening cardiopulmonary status with guarded prognosis. Pt to remain DNR/DNI as per him but everything else to be done. Disccussed with pt's daughter about pt's status & medical futility. Cardio & Palliative to have further discussion with family.     No leukocytosis. No PNA suspected.   Temp 96.6. Warming blanket in place.  UA, Ucx, Blood cx sent      4) Acute on Chronic Hypercapnic Respiratory Failure due to OHS/KYREE   BiPAP was considered and tried without resolution of hypercarbia on admission   Pulm recommend chronic NIV for chronic hypercarbic respiratory failure to improve symptoms and to decrease hospitalizations;- Pt refusing NIV (AVAPS) Nocturnal and PRN as per RT. Eventual SINGH with NIV (pt has AVAPS at home which will be taken to [facility] on d/c)  Pt placed back on BiPAP after RRT on 3/1. ABG showing resp acidosis with hypoxia. Pt has been refusing AVAPS at night as per RT.  PO steroids changed to IV steroids.  Pulm called for re-consult. Pt has been on 4hrs of BiPAP. Repeat ABG ordered.   - Pulmonary consult re-called  - Avoid Narcotics for pain      5) Acute on chronic kidney injury- worsened  Cr 1.1-->1.71--> 1.79-->1.84 with worsening GFR from 51--->30-->22  Will need to tolerate some degree of azotemia  ct hold losartan   lasix infusion as per renal but will need pressor support prior, ICU called  f/u BMP   - Avoid nephrotoxic medications  - Renal input appreciated    6) CAD stents in 2014 / PAD / HLD / HTN  - Continue, Imdur, Lipitor, Coreg and Norvasc   - Plavix on hold since last admission due to GI Bleed. ASA resumed as per GI, plavix yet on hold    7) DM 2- uncontrolled due to steroids  - HbA1c 5.6  - Accu checks and ISS  - Increase Lantus to 10 units at bedtime, add humalog 3U    8) Anemia  - Multifactorial (chronic, now complicated with acute bleeding)  - Plan as above  - s/p Venofer   - Continue Procrit    9) Transaminitis  - Likely secondary to liver congestion  - Improved    10) History of Depression  - Continue Duloxetine 60 mg    11) B/L UE swelling  - Arm elevation  - Warm compresses   - No DVT     Elevated BUN- likely due to steroids    Encephalopathy due to resp acidosis- ABG noted. On BiPAP with improvement in lethargy.     DVT Prophylaxis -- heparin sc resumed, f/u H/H    Guarded prognosis Pt is a 62 yo presenting to ED w/ SOB 2/2 diastolic HF exacerbation and noted to have KEITH and hyperkalemia, PMH CAD s/p CABG and PCI, PAD s/p baloon angioplasty, DM2, HTN, HLD, hx of cardiac arrest, recent GI bleed last month, Diastolic CHF, legally blind, pleural effusion requiring thoracentesis in Dec 2018.         >Chest pain with hx of CAD & CABG/PCI-  mild TnI elevation likely from demand ischemia & poor clearence. Disccussed with cardio. Will not follow further TnI. c/w Imdur, BB, statin, ASA. Plavix on hold since last admission due to GI Bleed.      1) Lower GI Bleed likely diverticular  - s/p 1 unit of PRBC  - GI Consult appreciated  - s/p Colonoscopy   ~ Poor prep throughout the colon. Diverticulosis seen. Unable to visualize colon sufficiently for poor prep  No further GI w/u recommended, ASA resumed as per GI, plavix yet on hold  Hg increased from 7.8 to 8.0 today, no further reported bleeding. Clear for d/c as per GI  Advanced diet     2) Hyperkalemia  - Hold Losartan    3) Acute on Chronic Diastolic Heart Failure with severe PHTN  & right ventricular failure from OHS/ KYREE- now with pulm edema & pleural effusions  - Was initially on Lasix Infusion which is now off  c/w coreg & imdur.  - Hold Losartan due to hyperkalemia & now KEITH  - ECHO shows EF of > 75%. Moderate to Severe TR, mod-severe PHTN  Cardio follow up appreciated  CXR on 2/24 with worsening pulm vasc congestion, increasing BNP,  Demadex 20 mg BID was switched to lasix 40mg IV q12 but was held for few days due to worsening kidney function & metabolic alkalosis from diuresis.     Repeat CXR  on 2/26 with no significant improvement in PVC. Opacity right lung which may represent right pleural effusion and/or airspace opacity. Nonspecific lucencies overlies the right chest.     CT chest ordered: Moderate to large right and small left pleural effusions with almost complete atelectasis of the right lower lobe secondary to the pleural effusion; right pleural effusion has increased in size and left pleural effusion is new since 1/13/2019.Patchy groundglass opacities in the left lower lobe, either infectious/inflammatory or secondary to pulmonary edema. Additional ground glass attenuation in the lungs, likely pulmonary edema.Diffuse anasarca.Small amount of ascites within the imaged upper abdomen.    Lasix yet on hold as per cardio due to worsening kidney function    CTS consult called for possible thoracentesis, s/p right sided pigtail by IR. Fluid analysis consistent with transudate      Currently intravascularly volume depleted with 3rd spacing. Extremely labile to fluid shifts. Becomes hypotensive when given diuretics with albumin but also with worsening pulm edema & pleural effusions.     RRT noted on 3/1 for hypotension & lethargy. Pt became hypotensive after bumex with albumin was given. Bumex on hold with IVF given by RRT team. SBP now in 110's so IVF d/em. CXR with worsening plum edema b/l. BiPAP placed. Bumex gtt ordered by renal. ICU consult called for vasopressor support during IV diuresis. Midodrine ordered  She became increasingly lethargic with ABG showing resp acidosis with hypoxia. Pt has been refusing AVAPS at night as per RT.  PO steroids changed to IV steroids.  Pulm called for re-consult. Pt has been on 4hrs of BiPAP. Repeat ABG ordered.   Chest tube on right draining 1.1L overnight. Maintain chest tube per CTS  Bladder scan showing 700cc urine. Russo placed.    at bedside, updated about recent events & pt's worsening cardiopulmonary status with guarded prognosis. Pt to remain DNR/DNI as per him but everything else to be done. Disccussed with pt's daughter about pt's status & medical futility. Cardio & Palliative to have further discussion with family.         Hypothermia-  No leukocytosis.  No PNA suspected.   Temp 96.6. Warming blanket in place.  UA +ve, Ucx sent, Blood cx sent  Rocephin started      4) Acute on Chronic Hypercapnic Respiratory Failure due to OHS/KYREE   BiPAP was considered and tried without resolution of hypercarbia on admission   Pulm recommend chronic NIV for chronic hypercarbic respiratory failure to improve symptoms and to decrease hospitalizations;- Pt refusing NIV (AVAPS) Nocturnal and PRN as per RT. Eventual SINGH with NIV (pt has AVAPS at home which will be taken to [facility] on d/c)  Pt placed back on BiPAP after RRT on 3/1. ABG showing resp acidosis with hypoxia. Pt has been refusing AVAPS at night as per RT.  PO steroids changed to IV steroids.  Pulm called for re-consult. Pt has been on 4hrs of BiPAP. Repeat ABG ordered.   - Pulmonary consult re-called  - Avoid Narcotics for pain      5) Acute on chronic kidney injury- worsened  Cr 1.1-->1.71--> 1.79-->1.84 with worsening GFR from 51--->30-->22  Will need to tolerate some degree of azotemia  ct hold losartan   lasix infusion as per renal but will need pressor support prior, ICU called  f/u BMP   - Avoid nephrotoxic medications  - Renal input appreciated    6) CAD stents in 2014 / PAD / HLD / HTN  - Continue, Imdur, Lipitor, Coreg and Norvasc   - Plavix on hold since last admission due to GI Bleed. ASA resumed as per GI, plavix yet on hold    7) DM 2- uncontrolled due to steroids  - HbA1c 5.6  - Accu checks and ISS  - Increase Lantus to 10 units at bedtime, add humalog 3U    8) Anemia  - Multifactorial (chronic, now complicated with acute bleeding)  - Plan as above  - s/p Venofer   - Continue Procrit    9) Transaminitis  - Likely secondary to liver congestion  - Improved    10) History of Depression  - Continue Duloxetine 60 mg    11) B/L UE swelling  - Arm elevation  - Warm compresses   - No DVT     Elevated BUN- likely due to steroids    Encephalopathy due to resp acidosis- ABG noted. On BiPAP with improvement in lethargy.     DVT Prophylaxis -- heparin sc resumed, f/u H/H    Guarded prognosis

## 2019-03-01 NOTE — CONSULT NOTE ADULT - ASSESSMENT
64 yo female with morbid obesity, CAD s/p CABG and PCI, PVD, DM, HTN, HLD, prior cardiac arrest, CHFpEF, cor pulmonale, admitted Feb 11 with dyspnea and diastolic CHF exacerbation.  Patient is now on continuous bipap, unable to wean.  Started on bumex infusion.     I spoke with the patient's  Fabio today regarding the seriousness of the patient's condition and goals of care.  He understands how ill the patient is and hopes she improves with conservative therapy, but does not want aggressive life support measures such as intubation, CPR, vasopressors, or dialysis, reiterating to me that he doesn't want her to suffer.    Palliative care team has been heavily involved in her care as well.     At the moment there are no additional interventions we can offer her in the intensive care unit.  Should circumstances change please feel free to re-consult us.

## 2019-03-01 NOTE — PROGRESS NOTE ADULT - PROBLEM SELECTOR PLAN 6
Patient transferred to SDU earlier 2/2 worsening cardio and  respiratory status.   Patient DNR/I, continuing with medical interventions.  Overall prognosis poor given multiple comorbidities.  GOC discussion with family yesterday 2/28.  Hospice was discussed.   Met with patient's brother- he states patient's  was in  hospital earlier, likely be back this afternoon.  PC to follow up with family.

## 2019-03-01 NOTE — PROGRESS NOTE ADULT - ASSESSMENT
-MO with OHS; was doing well on AVAPS  -Pulm edema  -Pleural effusion; s/p pigtail; low albumin, protein.  -Diastolic CHF  -Pulm HTN  -Renal failure  -GI bleed with anemia  -Episode of hypotension  -Hypercarbic resp failure; latest ABG compensated pH; see above for etiologies  -Chronic hypoxic resp failure; see above    Pt DNR    RECC:  Continue bpap for now. Chest tube to drainage. Diurese as BP allows. Renal f/u. Cardiology f/u. ICU eval.    Prognosis poor.

## 2019-03-01 NOTE — PROGRESS NOTE ADULT - PROBLEM SELECTOR PLAN 1
Patient with right sided failure. Pulmonary hypertension. Continues to have waxing and waning.   Agree with bumex drip for now. Adequate urine output.   Discussed at length with patient's  over the phone.

## 2019-03-01 NOTE — CHART NOTE - NSCHARTNOTEFT_GEN_A_CORE
Rapid Response PGY 2/ PGY 3 Note  Patient is a 63y old  Female admitted for acute on chronic hypercapnic respiratory failure, multifactorial from acute on chronic HFpEF in addition to OHS/KYREE poorly compliant with NIPPV at home, complicated with KEITH from diuretic therapy and GI bleed, she is s/p chest tube placement yesterday.  Rapid response team called because bedside nurse was to change the draining system from chest tube and she noticed patient lethargic when her baseline mentation is Alert and oriented x 3.  Patient was seen and examined at the bedside by the rapid response team, patient is unable to give a history due to her clinical condition.    Allergies  Accupril (Other)    PAST MEDICAL & SURGICAL HISTORY:  Herniated disc, cervical  PAD (peripheral artery disease)  Legally blind  History of peripheral vascular disease  History of retinal detachment  History of CEA (carotid endarterectomy): Right  Hyperlipidemia  Glaucoma  Hypertension  Diabetes  S/P CABG x 1  After cataract  Uveitic glaucoma of both eyes  Detached retina  Atherosclerosis of right carotid artery   delivery delivered    Vital Signs Last 24 Hrs  T(C): 36.9 (2019 23:24), Max: 36.9 (2019 07:48)  T(F): 98.5 (2019 23:24), Max: 98.5 (2019 23:24)  HR: 65 (2019 23:24) (56 - 65)  BP: 112/64 (2019 18:47) (112/64 - 138/68)  RR: 18 (2019 23:24) (18 - 19)  SpO2: 99% (2019 23:24) (94% - 100%)    GENERAL: The patient is lethargic in moderate respiratory distress.   HEENT: Head is Normocephalic, Atraumatic; there is a corneal scar noted on the right eye, left eye with round pupil which is reactive to light, dry mucous membranes,   RESPIRATORY: Decreased breath sounds bilaterally with mild expiratory wheezing.  HEART: Regular rate and rhythm ,+S1/+S2, no murmurs, rubs, gallops  GI: Abdomen is obese, soft, nontender, and nondistended.  EXTREMITIES: Distal limbs are noted cold, no cyanosis, no edema.  NEUROLOGIC: Lethargic, patient say yes when asked her name, she intermittently follows commands and therefore difficult to evaluate if there is focalization.    I&O's Summary  2019 07:01  -  01 Mar 2019 04:47  --------------------------------------------------------  IN: 0 mL / OUT: 1250 mL / NET: -1250 mL                        8.1    4.5   )-----------( 166      ( 2019 08:27 )             24.8       137  |  95<L>  |  96.0<H>  ----------------------------<  192<H>  5.2   |  35.0<H>  |  1.79<H>    Ca    8.3<L>      2019 08:27  Phos  3.2       Mg     2.7         TPro  7.0  /  Alb  x   /  TBili  x   /  DBili  x   /  AST  x   /  ALT  x   /  AlkPhos  x       Blood Gas Profile - Arterial (19 @ 12:01)    pH, Arterial: 7.36    pCO2, Arterial: 66 mmHg    pO2, Arterial: 76 mmHg    HCO3, Arterial: 34 mmoL/L    Base Excess, Arterial: 10.4 mmol/L    Oxygen Saturation, Arterial: 96 %    FIO2, Arterial: 4l nc    Blood Gas Source Arterial: Arterial    MEDICATIONS  (STANDING):  albumin human 25% IVPB 50 milliLiter(s) IV Intermittent every 12 hours  ALBUTerol    0.083% 10 milliGRAM(s) Nebulizer once  ALBUTerol/ipratropium for Nebulization 3 milliLiter(s) Nebulizer every 6 hours  amLODIPine   Tablet 10 milliGRAM(s) Oral daily  aspirin enteric coated 81 milliGRAM(s) Oral daily  atorvastatin 80 milliGRAM(s) Oral at bedtime  buMETAnide Injectable 1 milliGRAM(s) IV Push every 12 hours  carvedilol 25 milliGRAM(s) Oral every 12 hours  dextrose 5%. 1000 milliLiter(s) (50 mL/Hr) IV Continuous <Continuous>  dextrose 50% Injectable 12.5 Gram(s) IV Push once  dextrose 50% Injectable 25 Gram(s) IV Push once  dextrose 50% Injectable 25 Gram(s) IV Push once  docusate sodium 100 milliGRAM(s) Oral two times a day  DULoxetine 60 milliGRAM(s) Oral daily  epoetin sara Injectable 19249 Unit(s) SubCutaneous <User Schedule>  gabapentin 100 milliGRAM(s) Oral three times a day  heparin  Injectable 5000 Unit(s) SubCutaneous every 12 hours  insulin glargine Injectable (LANTUS) 10 Unit(s) SubCutaneous at bedtime  insulin lispro (HumaLOG) corrective regimen sliding scale   SubCutaneous three times a day before meals  insulin lispro Injectable (HumaLOG) 3 Unit(s) SubCutaneous three times a day with meals  isosorbide   mononitrate ER Tablet (IMDUR) 60 milliGRAM(s) Oral daily  magnesium oxide 400 milliGRAM(s) Oral daily  pantoprazole    Tablet 40 milliGRAM(s) Oral before breakfast  polyethylene glycol 3350 17 Gram(s) Oral daily  predniSONE   Tablet   Oral   predniSONE   Tablet 30 milliGRAM(s) Oral daily  senna 2 Tablet(s) Oral at bedtime    MEDICATIONS  (PRN):  acetaminophen   Tablet .. 650 milliGRAM(s) Oral every 6 hours PRN Mild Pain (1 - 3)  dextrose 40% Gel 15 Gram(s) Oral once PRN Blood Glucose LESS THAN 70 milliGRAM(s)/deciliter  glucagon  Injectable 1 milliGRAM(s) IntraMuscular once PRN Glucose LESS THAN 70 milligrams/deciliter  guaiFENesin    Syrup 100 milliGRAM(s) Oral every 6 hours PRN Cough  ondansetron Injectable 4 milliGRAM(s) IV Push every 6 hours PRN Nausea and/or Vomiting      Assessment- Rapid Response called for 63y year old Female with history of pulmonary hypertension, HFpEF and KYREE/OHS due to AMS    Plan-  AMS is most likely secondary to hypercapnia from a combination of pulmonary edema, evidenced by acidosis and CO2 accumulation compared with her baseline, will reinstate BiPAP despite AMS since patient is DNI/DNR. Will hold from obtaining new chest imaging, last Chest X-Ray from yesterday afternoon showing pulmonary edema and chest tube is draining appropriately.  Other causes such as infection and a neurological event are less likely. No sedatives given recently. Patient's capillary blood glucose was 76 mg/dL (01 Mar 2019 03:58) which is low for her, she was given half an amp of DW50% with not much improvement.  Patient's blood pressure during the code was low with a SBP in the 60s, this could be due to extensive fluid shift after the chest tube placement, will give IV fluids in the interim and consider vasopressors.  Patient's prognosis is guarded, I contacted the daughter (Cas Alas [phone]) who stated that patient's DNR/DNI status was not definitive but she acknowledge that the person to go for medical decisions if patient is unable to make her own would be patient's  (Fabio Toribio [phone]) who spoke with attending and reiterated the DNR/DNI status, it was also verified that he gave verbal consent for DNI/DNR on the MOLTS for in the patient's chart. He is agreeable to IVF and to BiPAP, he was informed that patient's prognosis is guarded.  Will upgrade to Step down unit and maintain on NIPPV  SROC Dr. Escalera and attending on call Dr. Matta at bedside and agree with plan.

## 2019-03-01 NOTE — PROGRESS NOTE ADULT - SUBJECTIVE AND OBJECTIVE BOX
[place] [facility]                                                        Office: [address] [address]                                                       Telephone: [phone]. Fax: [phone]      CC: Shortness of breath.     INTERVAL HISTORY: Patient now in ICU. BIPAP. Poor mental status.     MEDICATIONS  (STANDING):  ALBUTerol    0.083% 10 milliGRAM(s) Nebulizer once  ALBUTerol/ipratropium for Nebulization 3 milliLiter(s) Nebulizer every 6 hours  amLODIPine   Tablet 10 milliGRAM(s) Oral daily  aspirin enteric coated 81 milliGRAM(s) Oral daily  atorvastatin 80 milliGRAM(s) Oral at bedtime  buMETAnide Infusion 0.5 mG/Hr (2.5 mL/Hr) IV Continuous <Continuous>  carvedilol 25 milliGRAM(s) Oral every 12 hours  cefTRIAXone   IVPB      dextrose 5%. 1000 milliLiter(s) (50 mL/Hr) IV Continuous <Continuous>  dextrose 50% Injectable 12.5 Gram(s) IV Push once  dextrose 50% Injectable 25 Gram(s) IV Push once  dextrose 50% Injectable 25 Gram(s) IV Push once  docusate sodium 100 milliGRAM(s) Oral two times a day  DULoxetine 60 milliGRAM(s) Oral daily  epoetin sara Injectable 82769 Unit(s) SubCutaneous <User Schedule>  gabapentin 100 milliGRAM(s) Oral three times a day  heparin  Injectable 5000 Unit(s) SubCutaneous every 12 hours  insulin glargine Injectable (LANTUS) 10 Unit(s) SubCutaneous at bedtime  insulin lispro (HumaLOG) corrective regimen sliding scale   SubCutaneous three times a day before meals  isosorbide   mononitrate ER Tablet (IMDUR) 60 milliGRAM(s) Oral daily  magnesium oxide 400 milliGRAM(s) Oral daily  midodrine 5 milliGRAM(s) Oral every 8 hours  pantoprazole    Tablet 40 milliGRAM(s) Oral before breakfast  polyethylene glycol 3350 17 Gram(s) Oral daily  senna 2 Tablet(s) Oral at bedtime    ROS: All others negative     PHYSICAL EXAM:  T(C): 36.6 (03-01-19 @ 18:52), Max: 36.9 (02-28-19 @ 23:24)  HR: 65 (03-01-19 @ 18:52) (58 - 65)  BP: 169/70 (03-01-19 @ 18:52) (100/- - 169/70)  RR: 17 (03-01-19 @ 18:52) (14 - 20)  SpO2: 100% (03-01-19 @ 18:52) (78% - 100%)  Wt(kg): --  I&O's Summary    28 Feb 2019 07:01  -  01 Mar 2019 07:00  --------------------------------------------------------  IN: 0 mL / OUT: 1900 mL / NET: -1900 mL    01 Mar 2019 07:01  -  01 Mar 2019 19:03  --------------------------------------------------------  IN: 72.5 mL / OUT: 2275 mL / NET: -2202.5 mL      Appearance: Normal	  HEENT:   BIPAP in place.   Lymphatic: No lymphadenopathy  Cardiovascular: Normal S1 S2, No JVD, No murmurs, No edema  Respiratory: Lungs clear to auscultation	  Psychiatry: not alert.   Gastrointestinal:  Soft, Non-tender, + BS	  Skin: No rashes, No ecchymoses, No cyanosis  Neurologic: Non-focal  Extremities: LROM.   Vascular: Peripheral pulses palpable 2+ bilaterally    TELEMETRY: 	      LABS:	 	                        8.7    6.2   )-----------( 176      ( 01 Mar 2019 09:30 )             26.2     03-01    138  |  96<L>  |  101.0<H>  ----------------------------<  106  5.3   |  34.0<H>  |  1.84<H>    Ca    7.8<L>      01 Mar 2019 09:30  Phos  3.7     03-01  Mg     2.7     03-01    TPro  7.0  /  Alb  x   /  TBili  x   /  DBili  x   /  AST  x   /  ALT  x   /  AlkPhos  x   02-28

## 2019-03-01 NOTE — PROGRESS NOTE ADULT - SUBJECTIVE AND OBJECTIVE BOX
CC:  follow up GOC  INTERVAL HPI/OVERNIGHT EVENTS:    PRESENT SYMPTOMS: SOURCE:  Patient/Family/Team    PAIN SCALE:  0 = none  1 = mild   2 = moderate  3 = severe    Pain: appears comfortable    Dyspnea:  [x ] YES [ ] NO placed on Bipap  Anxiety:  [ ] YES [x ] NO  Fatigue: [ x] YES [ ] NO  Nausea: [ ] YES [ ] NO  na  Loss of Appetite: [ ] YES [ ] NO  na NPO  Other symptoms: __________    MEDICATIONS  (STANDING):  ALBUTerol    0.083% 10 milliGRAM(s) Nebulizer once  ALBUTerol/ipratropium for Nebulization 3 milliLiter(s) Nebulizer every 6 hours  amLODIPine   Tablet 10 milliGRAM(s) Oral daily  aspirin enteric coated 81 milliGRAM(s) Oral daily  atorvastatin 80 milliGRAM(s) Oral at bedtime  buMETAnide Infusion 0.5 mG/Hr (2.5 mL/Hr) IV Continuous <Continuous>  carvedilol 25 milliGRAM(s) Oral every 12 hours  cefTRIAXone   IVPB      cefTRIAXone   IVPB 1 Gram(s) IV Intermittent once  dextrose 5%. 1000 milliLiter(s) (50 mL/Hr) IV Continuous <Continuous>  dextrose 50% Injectable 12.5 Gram(s) IV Push once  dextrose 50% Injectable 25 Gram(s) IV Push once  dextrose 50% Injectable 25 Gram(s) IV Push once  docusate sodium 100 milliGRAM(s) Oral two times a day  DULoxetine 60 milliGRAM(s) Oral daily  epoetin sara Injectable 33358 Unit(s) SubCutaneous <User Schedule>  gabapentin 100 milliGRAM(s) Oral three times a day  heparin  Injectable 5000 Unit(s) SubCutaneous every 12 hours  insulin glargine Injectable (LANTUS) 10 Unit(s) SubCutaneous at bedtime  insulin lispro (HumaLOG) corrective regimen sliding scale   SubCutaneous three times a day before meals  isosorbide   mononitrate ER Tablet (IMDUR) 60 milliGRAM(s) Oral daily  magnesium oxide 400 milliGRAM(s) Oral daily  midodrine 10 milliGRAM(s) Oral every 8 hours  pantoprazole    Tablet 40 milliGRAM(s) Oral before breakfast  polyethylene glycol 3350 17 Gram(s) Oral daily  senna 2 Tablet(s) Oral at bedtime    MEDICATIONS  (PRN):  acetaminophen   Tablet .. 650 milliGRAM(s) Oral every 6 hours PRN Mild Pain (1 - 3)  dextrose 40% Gel 15 Gram(s) Oral once PRN Blood Glucose LESS THAN 70 milliGRAM(s)/deciliter  glucagon  Injectable 1 milliGRAM(s) IntraMuscular once PRN Glucose LESS THAN 70 milligrams/deciliter  guaiFENesin    Syrup 100 milliGRAM(s) Oral every 6 hours PRN Cough  ondansetron Injectable 4 milliGRAM(s) IV Push every 6 hours PRN Nausea and/or Vomiting      Allergies    Accupril (Other)    Intolerances      Karnofsky Performance Score/Palliative Performance Status Version 2: 20  %    Vital Signs Last 24 Hrs  T(C): 36.1 (01 Mar 2019 11:00), Max: 36.9 (2019 23:24)  T(F): 97 (01 Mar 2019 11:00), Max: 98.5 (2019 23:24)  HR: 64 (01 Mar 2019 11:09) (56 - 65)  BP: 121/60 (01 Mar 2019 11:00) (100/- - 139/63)  BP(mean): 84 (01 Mar 2019 11:00) (76 - 91)  RR: 15 (01 Mar 2019 11:09) (14 - 20)  SpO2: 93% (01 Mar 2019 11:09) (83% - 100%)    PHYSICAL EXAM:    General: WD woman, awakens to verbal communication, on Bipap    HEENT: [x ] normal  [ ] dry mouth  [ ] ET tube/trach    Lungs: [x ] comfortable - on Bipap    CV: [x ] normal  [ ] tachycardia    GI: [ ] normal  [ ] distended  [ ] tender  [ ] no BS               [ ] PEG/NG/OG tube    : [x ] normal  [ ] incontinent  [ ] oliguria/anuria  [ ] fontenot    MSK: [ ] normal  [x ] weakness  [ ] edema             [ ] ambulatory  [ x] bedbound/wheelchair bound    Skin: [ ] normal  [ ] pressure ulcers- Stage_____  [x ] no rash    LABS:                        8.7    6.2   )-----------( 176      ( 01 Mar 2019 09:30 )             26.2     03-01    138  |  96<L>  |  101.0<H>  ----------------------------<  106  5.3   |  34.0<H>  |  1.84<H>    Ca    7.8<L>      01 Mar 2019 09:30  Phos  3.7       Mg     2.7         TPro  7.0  /  Alb  x   /  TBili  x   /  DBili  x   /  AST  x   /  ALT  x   /  AlkPhos  x         Urinalysis Basic - ( 01 Mar 2019 10:41 )    Color: Yellow / Appearance: Clear / S.010 / pH: x  Gluc: x / Ketone: Negative  / Bili: Negative / Urobili: 1 mg/dL   Blood: x / Protein: 100 mg/dL / Nitrite: Negative   Leuk Esterase: Moderate / RBC: 6-10 /HPF / WBC >50   Sq Epi: x / Non Sq Epi: Occasional / Bacteria: TNTC      I&O's Summary    2019 07:  -  01 Mar 2019 07:00  --------------------------------------------------------  IN: 0 mL / OUT: 1900 mL / NET: -1900 mL    01 Mar 2019 07:  -  01 Mar 2019 12:11  --------------------------------------------------------  IN: 0 mL / OUT: 610 mL / NET: -610 mL        RADIOLOGY & ADDITIONAL STUDIES:  < from: Xray Chest 1 View- PORTABLE-Routine (19 @ 05:46) >   EXAM:  XR CHEST PORTABLE ROUTINE 1V                          PROCEDURE DATE:  2019          INTERPRETATION:  History: Follow-up of pleural effusion and right chest   tube    Technique:  AP portable    Comparisons:  Chest x-ray dated 2019    Findings:     Diffuse increase in bilateral pulmonary edema. No   significant pleural fluid pigtail catheter is coiled in the right lower   lung field.  . Cardiomegaly. Cannot exclude pericardial effusion. Median   sternotomy.    Impression: Diffuse increase in pulmonary edema.    Enlarged cardiac silhouette. Cannot exclude pericardial effusion    < end of copied text >      ADVANCE DIRECTIVES:  [ x] YES [ ] NO    DNR: [ x] YES [ ] NO      Date Completed:                    MOLST [ x] YES [ ] NO   Date Completed:     COUNSELING:  Advanced Care Planning Discussion - Time Spent ______Minutes.   More than 50% time spent in counseling and coordinating care. ______ Minutes.     Thank you for the opportunity to assist with the care of this patient.   [facility] [phone].

## 2019-03-02 LAB
ALBUMIN SERPL ELPH-MCNC: 2.5 G/DL — LOW (ref 3.3–5.2)
ALP SERPL-CCNC: 123 U/L — HIGH (ref 40–120)
ALT FLD-CCNC: 17 U/L — SIGNIFICANT CHANGE UP
ANION GAP SERPL CALC-SCNC: 10 MMOL/L — SIGNIFICANT CHANGE UP (ref 5–17)
AST SERPL-CCNC: 17 U/L — SIGNIFICANT CHANGE UP
BILIRUB SERPL-MCNC: 1.2 MG/DL — SIGNIFICANT CHANGE UP (ref 0.4–2)
BUN SERPL-MCNC: 95 MG/DL — HIGH (ref 8–20)
CALCIUM SERPL-MCNC: 8.3 MG/DL — LOW (ref 8.6–10.2)
CHLORIDE SERPL-SCNC: 96 MMOL/L — LOW (ref 98–107)
CO2 SERPL-SCNC: 34 MMOL/L — HIGH (ref 22–29)
CREAT SERPL-MCNC: 1.69 MG/DL — HIGH (ref 0.5–1.3)
EOSINOPHIL # BLD AUTO: 0 K/UL — SIGNIFICANT CHANGE UP (ref 0–0.5)
EOSINOPHIL NFR BLD AUTO: 0 % — SIGNIFICANT CHANGE UP (ref 0–6)
GLUCOSE BLDC GLUCOMTR-MCNC: 115 MG/DL — HIGH (ref 70–99)
GLUCOSE BLDC GLUCOMTR-MCNC: 121 MG/DL — HIGH (ref 70–99)
GLUCOSE BLDC GLUCOMTR-MCNC: 242 MG/DL — HIGH (ref 70–99)
GLUCOSE BLDC GLUCOMTR-MCNC: 248 MG/DL — HIGH (ref 70–99)
GLUCOSE SERPL-MCNC: 113 MG/DL — SIGNIFICANT CHANGE UP (ref 70–115)
HCT VFR BLD CALC: 25 % — LOW (ref 37–47)
HGB BLD-MCNC: 8.6 G/DL — LOW (ref 12–16)
LYMPHOCYTES # BLD AUTO: 0.4 K/UL — LOW (ref 1–4.8)
LYMPHOCYTES # BLD AUTO: 4.5 % — LOW (ref 20–55)
MAGNESIUM SERPL-MCNC: 2.6 MG/DL — SIGNIFICANT CHANGE UP (ref 1.6–2.6)
MCHC RBC-ENTMCNC: 30 PG — SIGNIFICANT CHANGE UP (ref 27–31)
MCHC RBC-ENTMCNC: 34.4 G/DL — SIGNIFICANT CHANGE UP (ref 32–36)
MCV RBC AUTO: 87.1 FL — SIGNIFICANT CHANGE UP (ref 81–99)
MONOCYTES # BLD AUTO: 0.3 K/UL — SIGNIFICANT CHANGE UP (ref 0–0.8)
MONOCYTES NFR BLD AUTO: 3 % — SIGNIFICANT CHANGE UP (ref 3–10)
NEUTROPHILS # BLD AUTO: 8.9 K/UL — HIGH (ref 1.8–8)
NEUTROPHILS NFR BLD AUTO: 92.1 % — HIGH (ref 37–73)
PHOSPHATE SERPL-MCNC: 3.3 MG/DL — SIGNIFICANT CHANGE UP (ref 2.4–4.7)
PLATELET # BLD AUTO: 185 K/UL — SIGNIFICANT CHANGE UP (ref 150–400)
POTASSIUM SERPL-MCNC: 5 MMOL/L — SIGNIFICANT CHANGE UP (ref 3.5–5.3)
POTASSIUM SERPL-SCNC: 5 MMOL/L — SIGNIFICANT CHANGE UP (ref 3.5–5.3)
PROT SERPL-MCNC: 6.1 G/DL — LOW (ref 6.6–8.7)
RBC # BLD: 2.87 M/UL — LOW (ref 4.4–5.2)
RBC # FLD: 16.6 % — HIGH (ref 11–15.6)
SODIUM SERPL-SCNC: 140 MMOL/L — SIGNIFICANT CHANGE UP (ref 135–145)
WBC # BLD: 9.7 K/UL — SIGNIFICANT CHANGE UP (ref 4.8–10.8)
WBC # FLD AUTO: 9.7 K/UL — SIGNIFICANT CHANGE UP (ref 4.8–10.8)

## 2019-03-02 PROCEDURE — 99233 SBSQ HOSP IP/OBS HIGH 50: CPT

## 2019-03-02 PROCEDURE — 70450 CT HEAD/BRAIN W/O DYE: CPT | Mod: 26

## 2019-03-02 PROCEDURE — 71045 X-RAY EXAM CHEST 1 VIEW: CPT | Mod: 26

## 2019-03-02 RX ORDER — MIDODRINE HYDROCHLORIDE 2.5 MG/1
2.5 TABLET ORAL THREE TIMES A DAY
Qty: 0 | Refills: 0 | Status: DISCONTINUED | OUTPATIENT
Start: 2019-03-02 | End: 2019-03-09

## 2019-03-02 RX ADMIN — Medication 60 MILLIGRAM(S): at 22:32

## 2019-03-02 RX ADMIN — Medication 3 MILLILITER(S): at 04:01

## 2019-03-02 RX ADMIN — HEPARIN SODIUM 5000 UNIT(S): 5000 INJECTION INTRAVENOUS; SUBCUTANEOUS at 05:25

## 2019-03-02 RX ADMIN — ISOSORBIDE MONONITRATE 60 MILLIGRAM(S): 60 TABLET, EXTENDED RELEASE ORAL at 13:16

## 2019-03-02 RX ADMIN — MAGNESIUM OXIDE 400 MG ORAL TABLET 400 MILLIGRAM(S): 241.3 TABLET ORAL at 13:15

## 2019-03-02 RX ADMIN — INSULIN GLARGINE 10 UNIT(S): 100 INJECTION, SOLUTION SUBCUTANEOUS at 22:33

## 2019-03-02 RX ADMIN — Medication 81 MILLIGRAM(S): at 13:15

## 2019-03-02 RX ADMIN — CEFTRIAXONE 100 GRAM(S): 500 INJECTION, POWDER, FOR SOLUTION INTRAMUSCULAR; INTRAVENOUS at 13:16

## 2019-03-02 RX ADMIN — Medication 60 MILLIGRAM(S): at 13:15

## 2019-03-02 RX ADMIN — GABAPENTIN 100 MILLIGRAM(S): 400 CAPSULE ORAL at 13:15

## 2019-03-02 RX ADMIN — DULOXETINE HYDROCHLORIDE 60 MILLIGRAM(S): 30 CAPSULE, DELAYED RELEASE ORAL at 13:16

## 2019-03-02 RX ADMIN — Medication 3 MILLILITER(S): at 08:40

## 2019-03-02 RX ADMIN — Medication 2: at 16:38

## 2019-03-02 RX ADMIN — Medication 3 MILLILITER(S): at 20:57

## 2019-03-02 RX ADMIN — Medication 3 MILLILITER(S): at 14:37

## 2019-03-02 RX ADMIN — Medication 60 MILLIGRAM(S): at 05:25

## 2019-03-02 RX ADMIN — HEPARIN SODIUM 5000 UNIT(S): 5000 INJECTION INTRAVENOUS; SUBCUTANEOUS at 18:40

## 2019-03-02 RX ADMIN — BUMETANIDE 2.5 MG/HR: 0.25 INJECTION INTRAMUSCULAR; INTRAVENOUS at 22:33

## 2019-03-02 NOTE — PROGRESS NOTE ADULT - ASSESSMENT
62 yo presenting to ED w/ SOB 2/2 diastolic HF exacerbation and noted to have KEITH and hyperkalemia, PMH CAD s/p CABG and PCI, PAD s/p baloon angioplasty, DM2, HTN, HLD, hx of cardiac arrest, recent GI bleed last month, Diastolic CHF, legally blind, pleural effusion requiring thoracentesis in Dec 2018.         >Chest pain with hx of CAD & CABG/PCI-  mild TnI elevation likely from demand ischemia & poor clearence. Disccussed with cardio. Will not follow further TnI. c/w Imdur, BB, statin, ASA. Plavix on hold since last admission due to GI Bleed.  1) Lower GI Bleed likely diverticular  - s/p 1 unit of PRBC  - GI Consult appreciated  - s/p Colonoscopy   ~ Poor prep throughout the colon. Diverticulosis seen. Unable to visualize colon sufficiently for poor prep  No further GI w/u recommended, ASA resumed as per GI, plavix yet on hold  Hg increased from 7.8 to 8.0 today, no further reported bleeding. Clear for d/c as per GI  Advanced diet     2) Hyperkalemia  - Hold Losartan    3) Acute on Chronic Diastolic Heart Failure with severe PHTN  & right ventricular failure from OHS/ KYREE- now with pulm edema & pleural effusions  - Was initially on Lasix Infusion which is now off  c/w coreg & imdur.  - Hold Losartan due to hyperkalemia & now KEITH  - ECHO shows EF of > 75%. Moderate to Severe TR, mod-severe PHTN  Cardio follow up appreciated  CXR on 2/24 with worsening pulm vasc congestion, increasing BNP,  Demadex 20 mg BID was switched to lasix 40mg IV q12 but was held for few days due to worsening kidney function & metabolic alkalosis from diuresis.     RRT noted on 3/1 for hypotension & lethargy. Pt became hypotensive after bumex with albumin was given. Bumex on hold with IVF given by RRT team. SBP now in 110's so IVF d/em. CXR with worsening plum edema b/l. BiPAP placed. Bumex gtt ordered by renal. ICU consult called for vasopressor support during IV diuresis. Midodrine ordered  She became increasingly lethargic with ABG showing resp acidosis with hypoxia. Pt has been refusing AVAPS at night as per RT.  PO steroids changed to IV steroids.  Pulm called for re-consult. Pt has been on 4hrs of BiPAP. Repeat ABG ordered. Chest tube on right draining 1.1L overnight. Maintain chest tube per CTS  Bladder scan showing 700cc urine. Russo placed. Pt to remain DNR/DNI as per him but everything else to be done. Discussed with pt's daughter about pt's status & medical futility. I had a long discussion with family.     Hypothermia-  No leukocytosis.  No PNA suspected.   Temp 96.6. Warming blanket in place.  UA +ve, Ucx sent, Blood cx sent  Rocephin started  4) Acute on Chronic Hypercapnic Respiratory Failure due to OHS/KYREE   BiPAP was considered and tried without resolution of hypercarbia on admission   Pulm recommend chronic NIV for chronic hypercarbic respiratory failure to improve symptoms and to decrease hospitalizations;- Pt refusing NIV (AVAPS) Nocturnal and PRN as per RT. Eventual SINGH with NIV (pt has AVAPS at home which will be taken to SINGH on d/c)  Pt placed back on BiPAP after RRT on 3/1. ABG showing resp acidosis with hypoxia. Pt has been refusing AVAPS at night as per RT.  PO steroids changed to IV steroids.  Pulm called for re-consult. Pt has been on 4hrs of BiPAP. Repeat ABG ordered.   - Pulmonary consult appreciated  - Avoid Narcotics for pain    5) Acute on chronic kidney injury-  ARF with prerenal on steroids; pulmonary Hypertension; Anemia; Chest  tube. Bladder retention. with UTI.  ct hold losartan   f/u BMP     6) CAD stents in 2014 / PAD / HLD / HTN  - Continue, Imdur, Lipitor, Coreg and Norvasc   - Plavix on hold since last admission due to GI Bleed. ASA resumed as per GI, plavix yet on hold    7) DM 2- elevated sugar due to steroids  - HbA1c 5.6  - Accu checks and ISS  - Increase Lantus to 10 units at bedtime, add Humalog 3U    8) Anemia  - Multifactorial (chronic, now complicated with acute bleeding)  - s/p Venofer   - Continue Procrit    9) Transaminitis- improved  - Likely secondary to liver congestion      10) History of Depression  - Continue Duloxetine 60 mg    11) B/L UE swelling  - Arm elevation  - Warm compresses   - No DVT     Elevated BUN- likely due to steroids    Encephalopathy due to resp acidosis- ABG noted. On BiPAP with improvement in lethargy.     DVT Prophylaxis -- heparin sc resumed, f/u H/H    Guarded prognosis

## 2019-03-02 NOTE — PROGRESS NOTE ADULT - SUBJECTIVE AND OBJECTIVE BOX
[facility] RO[facility] Patient is a 63y old  Female who presents with a chief complaint of SOB (02 Mar 2019 12:30)     HPI:  Pt is a 64 yo presenting to ED w/ SOB, PMH CAD s/p CABG and PCI, PAD s/p baloon angioplasty, DM2, HTN, HLD, hx of cardiac arrest, recent GI bleed last month, Diastolic CHF, legally blind, pleural effusion requiring thoracentesis in Dec 2018.   Patient states that she has been having increasing SOB over the last week, she lives at home with her family, denies sick contacts, but has been having more exertional dyspnea. She states she walks with a walker but it is taking less activity for her dyspnea to occur. She has been compliant with all of her medications, she does not recall the names, but states that there have been no changes to her medication regimen since her discharge. She denies any associated chest pain or pressure. She has been taking her tosemide she believes. Denies PND or orthopnea, but she has noted increased LE edema.   Denies headaches, nausea, vomiting, chest pain, palpitations, abdominal pain, constipation, diarrhea, melena, hematochezia, dysuria.   Her SOB has since resolved. She is breathing comfortably, has no complaints.  In ED patient was noted to have hyperkalemia on repeat labs, troponin elevated at 0.09. Her creatinine is also elevated. She did not receive any potassium treatment initially so I ordered Calcium gluc, kayexelate, Insulin, dextrose, Albuterol. EKG without widened QRS or peaked T waves. (2019 03:58)    The patient was seen and evaluated   The patient is in no acute distress.  Denied any fever chest pain, palpitations,  fever, dysuria, cough, edema   Complains of SOB , with BIPAP mask on seen by pulmonary discussed with     I&O's Summary    01 Mar 2019 07:01  -  02 Mar 2019 07:00  --------------------------------------------------------  IN: 102.5 mL / OUT: 5400 mL / NET: -5297.5 mL    02 Mar 2019 07:01  -  02 Mar 2019 17:41  --------------------------------------------------------  IN: 0 mL / OUT: 3000 mL / NET: -3000 mL      Allergies    Accupril (Other)    Intolerances      HEALTH ISSUES - PROBLEM Dx:  Functional quadriplegia: Functional quadriplegia  Encounter for palliative care: Encounter for palliative care  KEITH (acute kidney injury): KEITH (acute kidney injury)  Acute on chronic diastolic (congestive) heart failure: Acute on chronic diastolic (congestive) heart failure  Pleural effusion: Pleural effusion  Hypertension: Hypertension  Rectal bleeding: Rectal bleeding  Blood per rectum: Blood per rectum  Failure to thrive in adult: Failure to thrive in adult  Chest pain: Chest pain  Prophylactic measure: Prophylactic measure  Acute on chronic congestive heart failure, unspecified heart failure type: Acute on chronic congestive heart failure, unspecified heart failure type  Acute on chronic respiratory failure with hypoxia and hypercapnia: Acute on chronic respiratory failure with hypoxia and hypercapnia  Obesity hypoventilation syndrome: Obesity hypoventilation syndrome  KYREE (obstructive sleep apnea): KYREE (obstructive sleep apnea)  Acute respiratory failure with hypercapnia: Acute respiratory failure with hypercapnia  CHF (congestive heart failure): CHF (congestive heart failure)  Acute on chronic right-sided congestive heart failure: Acute on chronic right-sided congestive heart failure  Type 2 diabetes mellitus without complication, without long-term current use of insulin: Type 2 diabetes mellitus without complication, without long-term current use of insulin  Hyperkalemia: Hyperkalemia  CAD (coronary artery disease): CAD (coronary artery disease)  Heart failure, acute diastolic: Heart failure, acute diastolic        PAST MEDICAL & SURGICAL HISTORY:  Herniated disc, cervical  PAD (peripheral artery disease)  Legally blind  History of peripheral vascular disease  History of retinal detachment  History of CEA (carotid endarterectomy): Right  Hyperlipidemia  Glaucoma  Hypertension  Diabetes  S/P CABG x 1  After cataract  Uveitic glaucoma of both eyes  Detached retina  Atherosclerosis of right carotid artery   delivery delivered          Vital Signs Last 24 Hrs  T(C): 36.6 (02 Mar 2019 16:16), Max: 37.1 (02 Mar 2019 05:58)  T(F): 97.9 (02 Mar 2019 16:16), Max: 98.8 (02 Mar 2019 05:58)  HR: 75 (02 Mar 2019 14:37) (63 - 88)  BP: 149/57 (02 Mar 2019 11:43) (143/63 - 169/70)  BP(mean): 88 (02 Mar 2019 05:58) (88 - 121)  RR: 23 (02 Mar 2019 07:42) (7 - 31)  SpO2: 95% (02 Mar 2019 14:37) (95% - 100%)T(C): 36.6 (19 @ 16:16), Max: 37.1 (19 @ 05:58)  HR: 75 (19 @ 14:37) (63 - 88)  BP: 149/57 (19 @ 11:43) (143/63 - 169/70)  RR: 23 (19 @ 07:42) (7 - 31)  SpO2: 95% (19 @ 14:37) (95% - 100%)  Wt(kg): --    PHYSICAL EXAM:    GENERAL: NAD, on BIPAP morbidly obese  HEAD:  Atraumatic, Normocephalic  EYES: EOMI, PERRL  NERVOUS SYSTEM:  Arousable and barely  Moves upper and lower extremities; CNS-II-XII  CHEST/LUNG: Clear to auscultation bilaterally; No rales, rhonchi, wheezing,   HEART: Regular rate and rhythm; No murmurs,   ABDOMEN: Soft, Nontender, Nondistended; Bowel sounds present  EXTREMITIES:  Peripheral Pulses, No  cyanosis, or edema  Psychiatry-cant assess Insight and judgement intact     acetaminophen   Tablet .. 650 milliGRAM(s) Oral every 6 hours PRN  ALBUTerol    0.083% 10 milliGRAM(s) Nebulizer once  ALBUTerol/ipratropium for Nebulization 3 milliLiter(s) Nebulizer every 6 hours  amLODIPine   Tablet 10 milliGRAM(s) Oral daily  aspirin enteric coated 81 milliGRAM(s) Oral daily  atorvastatin 80 milliGRAM(s) Oral at bedtime  buMETAnide Infusion 0.5 mG/Hr IV Continuous <Continuous>  carvedilol 25 milliGRAM(s) Oral every 12 hours  cefTRIAXone   IVPB      cefTRIAXone   IVPB 1 Gram(s) IV Intermittent every 24 hours  dextrose 40% Gel 15 Gram(s) Oral once PRN  dextrose 5%. 1000 milliLiter(s) IV Continuous <Continuous>  dextrose 50% Injectable 12.5 Gram(s) IV Push once  dextrose 50% Injectable 25 Gram(s) IV Push once  dextrose 50% Injectable 25 Gram(s) IV Push once  docusate sodium 100 milliGRAM(s) Oral two times a day  DULoxetine 60 milliGRAM(s) Oral daily  epoetin sara Injectable 46127 Unit(s) SubCutaneous <User Schedule>  gabapentin 100 milliGRAM(s) Oral three times a day  glucagon  Injectable 1 milliGRAM(s) IntraMuscular once PRN  guaiFENesin    Syrup 100 milliGRAM(s) Oral every 6 hours PRN  heparin  Injectable 5000 Unit(s) SubCutaneous every 12 hours  insulin glargine Injectable (LANTUS) 10 Unit(s) SubCutaneous at bedtime  insulin lispro (HumaLOG) corrective regimen sliding scale   SubCutaneous three times a day before meals  isosorbide   mononitrate ER Tablet (IMDUR) 60 milliGRAM(s) Oral daily  magnesium oxide 400 milliGRAM(s) Oral daily  methylPREDNISolone sodium succinate Injectable 60 milliGRAM(s) IV Push every 8 hours  midodrine 2.5 milliGRAM(s) Oral three times a day PRN  ondansetron Injectable 4 milliGRAM(s) IV Push every 6 hours PRN  pantoprazole    Tablet 40 milliGRAM(s) Oral before breakfast  polyethylene glycol 3350 17 Gram(s) Oral daily  senna 2 Tablet(s) Oral at bedtime      LABS:  ABG - ( 01 Mar 2019 16:02 )  pH, Arterial: 7.42  pH, Blood: x     /  pCO2: 56    /  pO2: 105   / HCO3: 35    / Base Excess: 11.0  /  SaO2: 99                                      8.6    9.7   )-----------( 185      ( 02 Mar 2019 03:04 )             25.0     03-02    140  |  96<L>  |  95.0<H>  ----------------------------<  113  5.0   |  34.0<H>  |  1.69<H>    Ca    8.3<L>      02 Mar 2019 03:04  Phos  3.3     03-02  Mg     2.6     03-02    TPro  6.1<L>  /  Alb  2.5<L>  /  TBili  1.2  /  DBili  x   /  AST  17  /  ALT  17  /  AlkPhos  123<H>  03-02    LIVER FUNCTIONS - ( 02 Mar 2019 03:04 )  Alb: 2.5 g/dL / Pro: 6.1 g/dL / ALK PHOS: 123 U/L / ALT: 17 U/L / AST: 17 U/L / GGT: x             CARDIAC MARKERS ( 01 Mar 2019 09:30 )  x     / 0.10 ng/mL / x     / x     / x          Urinalysis Basic - ( 01 Mar 2019 10:41 )    Color: Yellow / Appearance: Clear / S.010 / pH: x  Gluc: x / Ketone: Negative  / Bili: Negative / Urobili: 1 mg/dL   Blood: x / Protein: 100 mg/dL / Nitrite: Negative   Leuk Esterase: Moderate / RBC: 6-10 /HPF / WBC >50   Sq Epi: x / Non Sq Epi: Occasional / Bacteria: TNTC      CAPILLARY BLOOD GLUCOSE      POCT Blood Glucose.: 242 mg/dL (02 Mar 2019 16:35)  POCT Blood Glucose.: 121 mg/dL (02 Mar 2019 11:15)  POCT Blood Glucose.: 115 mg/dL (02 Mar 2019 08:49)  POCT Blood Glucose.: 118 mg/dL (01 Mar 2019 21:39)      RADIOLOGY & ADDITIONAL TESTS:      Consultant notes reviewed    Case discussed with consultant/provider/ family /patient

## 2019-03-02 NOTE — PROGRESS NOTE ADULT - SUBJECTIVE AND OBJECTIVE BOX
RANJIT [id number]    Patient is a 63y old  Female who presents with a chief complaint of SOB (02 Mar 2019 08:20)      HISTORY OF PRESENT ILLNESS: Feeling improved. Less SOB. CT still draining. Off BPAP since this am.     MEDICATIONS  (STANDING):  ALBUTerol    0.083% 10 milliGRAM(s) Nebulizer once  ALBUTerol/ipratropium for Nebulization 3 milliLiter(s) Nebulizer every 6 hours  amLODIPine   Tablet 10 milliGRAM(s) Oral daily  aspirin enteric coated 81 milliGRAM(s) Oral daily  atorvastatin 80 milliGRAM(s) Oral at bedtime  buMETAnide Infusion 0.5 mG/Hr (2.5 mL/Hr) IV Continuous <Continuous>  carvedilol 25 milliGRAM(s) Oral every 12 hours  cefTRIAXone   IVPB      cefTRIAXone   IVPB 1 Gram(s) IV Intermittent every 24 hours  dextrose 5%. 1000 milliLiter(s) (50 mL/Hr) IV Continuous <Continuous>  dextrose 50% Injectable 12.5 Gram(s) IV Push once  dextrose 50% Injectable 25 Gram(s) IV Push once  dextrose 50% Injectable 25 Gram(s) IV Push once  docusate sodium 100 milliGRAM(s) Oral two times a day  DULoxetine 60 milliGRAM(s) Oral daily  epoetin sara Injectable 04054 Unit(s) SubCutaneous <User Schedule>  gabapentin 100 milliGRAM(s) Oral three times a day  heparin  Injectable 5000 Unit(s) SubCutaneous every 12 hours  insulin glargine Injectable (LANTUS) 10 Unit(s) SubCutaneous at bedtime  insulin lispro (HumaLOG) corrective regimen sliding scale   SubCutaneous three times a day before meals  isosorbide   mononitrate ER Tablet (IMDUR) 60 milliGRAM(s) Oral daily  magnesium oxide 400 milliGRAM(s) Oral daily  methylPREDNISolone sodium succinate Injectable 60 milliGRAM(s) IV Push every 8 hours  pantoprazole    Tablet 40 milliGRAM(s) Oral before breakfast  polyethylene glycol 3350 17 Gram(s) Oral daily  senna 2 Tablet(s) Oral at bedtime      MEDICATIONS  (PRN):  acetaminophen   Tablet .. 650 milliGRAM(s) Oral every 6 hours PRN Mild Pain (1 - 3)  dextrose 40% Gel 15 Gram(s) Oral once PRN Blood Glucose LESS THAN 70 milliGRAM(s)/deciliter  glucagon  Injectable 1 milliGRAM(s) IntraMuscular once PRN Glucose LESS THAN 70 milligrams/deciliter  guaiFENesin    Syrup 100 milliGRAM(s) Oral every 6 hours PRN Cough  midodrine 2.5 milliGRAM(s) Oral three times a day PRN systolic < 110  ondansetron Injectable 4 milliGRAM(s) IV Push every 6 hours PRN Nausea and/or Vomiting      Allergies    Accupril (Other)    Intolerances        PAST MEDICAL & SURGICAL HISTORY:  Herniated disc, cervical  PAD (peripheral artery disease)  Legally blind  History of peripheral vascular disease  History of retinal detachment  History of CEA (carotid endarterectomy): Right  Hyperlipidemia  Glaucoma  Hypertension  Diabetes  S/P CABG x 1  After cataract  Uveitic glaucoma of both eyes  Detached retina  Atherosclerosis of right carotid artery   delivery delivered                  REVIEW OF SYSTEMS:    CONSTITUTIONAL:  No fevers, chills, sweats    HEENT:  Eyes:  No diplopia or blurred vision. ENT:  No earache, sore throat or runny nose.    CARDIOVASCULAR:  No pressure, squeezing, tightness, or heaviness about the chest; no palpitations.    RESPIRATORY: per HPI      GASTROINTESTINAL:  No abdominal pain, nausea, vomiting or diarrhea.    GENITOURINARY:  No dysuria, frequency or urgency.    NEUROLOGIC:  No paresthesias, fasciculations, seizures or weakness.    PSYCHIATRIC:  No disorder of thought or mood.    Vital Signs Last 24 Hrs  T(C): 36.8 (02 Mar 2019 08:23), Max: 37.1 (02 Mar 2019 05:58)  T(F): 98.3 (02 Mar 2019 08:23), Max: 98.8 (02 Mar 2019 05:58)  HR: 74 (02 Mar 2019 11:43) (62 - 88)  BP: 149/57 (02 Mar 2019 11:43) (138/64 - 169/70)  BP(mean): 88 (02 Mar 2019 05:58) (88 - 121)  RR: 23 (02 Mar 2019 07:42) (7 - 31)  SpO2: 98% (02 Mar 2019 08:53) (78% - 100%)    PHYSICAL EXAMINATION:    GENERAL: The patient is in no apparent distress.     HEENT: Head is normocephalic and atraumatic.  Mucous membranes are moist.     NECK: Supple.     LUNGS: difficult exam, decreased at bases, rales, no wheeze, respirations unlabored    HEART: Regular rate and rhythm      ABDOMEN: Soft, nontender, and nondistended.      EXTREMITIES: Without any cyanosis, clubbing, rash, lesions or edema.    NEUROLOGIC: Grossly intact.      LABS:                        8.6    9.7   )-----------( 185      ( 02 Mar 2019 03:04 )             25.0     03-02    140  |  96<L>  |  95.0<H>  ----------------------------<  113  5.0   |  34.0<H>  |  1.69<H>    Ca    8.3<L>      02 Mar 2019 03:04  Phos  3.3     03-02  Mg     2.6     03-02    TPro  6.1<L>  /  Alb  2.5<L>  /  TBili  1.2  /  DBili  x   /  AST  17  /  ALT  17  /  AlkPhos  123<H>  03-      Urinalysis Basic - ( 01 Mar 2019 10:41 )    Color: Yellow / Appearance: Clear / S.010 / pH: x  Gluc: x / Ketone: Negative  / Bili: Negative / Urobili: 1 mg/dL   Blood: x / Protein: 100 mg/dL / Nitrite: Negative   Leuk Esterase: Moderate / RBC: 6-10 /HPF / WBC >50   Sq Epi: x / Non Sq Epi: Occasional / Bacteria: TNTC      ABG - ( 01 Mar 2019 16:02 )  pH, Arterial: 7.42  pH, Blood: x     /  pCO2: 56    /  pO2: 105   / HCO3: 35    / Base Excess: 11.0  /  SaO2: 99                CARDIAC MARKERS ( 01 Mar 2019 09:30 )  x     / 0.10 ng/mL / x     / x     / x               RADIOLOGY & ADDITIONAL STUDIES:  < from: Xray Chest 1 View-PORTABLE IMMEDIATE (19 @ 09:54) >     EXAM:  XR CHEST PORTABLE IMMED 1V                          PROCEDURE DATE:  2019          INTERPRETATION:  TECHNIQUE: Single portable view of the chest.    COMPARISON: 3/1/2019    CLINICAL HISTORY: Shortness of breath, bypass.     FINDINGS:    Single frontal view of the chest demonstrates right lower lobe pigtail   catheter. Improved CHF. No large pneumothorax. Mediastinal sternotomy   wires. The cardiomediastinal silhouette is enlarged. No acute osseous   abnormalities. Overlying EKG leads and wires are noted.    IMPRESSION: Improved CHF.                LIZA JIMENEZ M.D., ATTENDING RADIOLOGIST    < end of copied text >

## 2019-03-02 NOTE — PROGRESS NOTE ADULT - ASSESSMENT
-MO with OHS   -Pulm edema; improving  -Pleural effusion; s/p pigtail; low albumin, protein.  -Diastolic CHF  -Pulm HTN  -Renal failure  -GI bleed with anemia  -Episode of hypotension  -Hypercarbic resp failure; latest ABG compensated pH; see above for etiologies  -Chronic hypoxic resp failure; see above    Pt DNR    RECC:  Continue bpap QHS and prn.. Chest tube to drainage. Diurese as BP allows. Renal f/u. Cardiology f/u.      Prognosis poor.

## 2019-03-02 NOTE — PROGRESS NOTE ADULT - SUBJECTIVE AND OBJECTIVE BOX
NEPHROLOGY INTERVAL HPI/OVERNIGHT EVENTS:    Now on medical floor with bipap still. family at bedside.      MEDICATIONS  (STANDING):  ALBUTerol    0.083% 10 milliGRAM(s) Nebulizer once  ALBUTerol/ipratropium for Nebulization 3 milliLiter(s) Nebulizer every 6 hours  amLODIPine   Tablet 10 milliGRAM(s) Oral daily  aspirin enteric coated 81 milliGRAM(s) Oral daily  atorvastatin 80 milliGRAM(s) Oral at bedtime  carvedilol 25 milliGRAM(s) Oral every 12 hours  dextrose 5%. 1000 milliLiter(s) (50 mL/Hr) IV Continuous <Continuous>  dextrose 50% Injectable 12.5 Gram(s) IV Push once  dextrose 50% Injectable 25 Gram(s) IV Push once  dextrose 50% Injectable 25 Gram(s) IV Push once  docusate sodium 100 milliGRAM(s) Oral two times a day  DULoxetine 60 milliGRAM(s) Oral daily  epoetin sara Injectable 42976 Unit(s) SubCutaneous <User Schedule>  gabapentin 100 milliGRAM(s) Oral three times a day  heparin  Injectable 5000 Unit(s) SubCutaneous every 12 hours  insulin glargine Injectable (LANTUS) 10 Unit(s) SubCutaneous at bedtime  insulin lispro (HumaLOG) corrective regimen sliding scale   SubCutaneous three times a day before meals  insulin lispro Injectable (HumaLOG) 3 Unit(s) SubCutaneous three times a day with meals  isosorbide   mononitrate ER Tablet (IMDUR) 60 milliGRAM(s) Oral daily  magnesium oxide 400 milliGRAM(s) Oral daily  pantoprazole    Tablet 40 milliGRAM(s) Oral before breakfast  polyethylene glycol 3350 17 Gram(s) Oral daily  predniSONE   Tablet   Oral   senna 2 Tablet(s) Oral at bedtime    MEDICATIONS  (PRN):  acetaminophen   Tablet .. 650 milliGRAM(s) Oral every 6 hours PRN Mild Pain (1 - 3)  dextrose 40% Gel 15 Gram(s) Oral once PRN Blood Glucose LESS THAN 70 milliGRAM(s)/deciliter  glucagon  Injectable 1 milliGRAM(s) IntraMuscular once PRN Glucose LESS THAN 70 milligrams/deciliter  guaiFENesin    Syrup 100 milliGRAM(s) Oral every 6 hours PRN Cough  ondansetron Injectable 4 milliGRAM(s) IV Push every 6 hours PRN Nausea and/or Vomiting      Allergies    Accupril (Other)          Vital Signs Last 24 Hrs   T(C): 37.1 (02 Mar 2019 05:58), Max: 37.1 (02 Mar 2019 05:58)  T(F): 98.8 (02 Mar 2019 05:58), Max: 98.8 (02 Mar 2019 05:58)  HR: 78 (02 Mar 2019 07:42) (62 - 78)  BP: 146/47 (02 Mar 2019 07:42) (110/60 - 169/70)  BP(mean): 88 (02 Mar 2019 05:58) (79 - 121)  RR: 23 (02 Mar 2019 07:42) (7 - 31)  SpO2: 100% (02 Mar 2019 07:42) (78% - 100%)    T(C): 35.7 (01 Mar 2019 08:00), Max: 36.9 (28 Feb 2019 23:24)  T(F): 96.3 (01 Mar 2019 08:00), Max: 98.5 (28 Feb 2019 23:24)  HR: 62 (01 Mar 2019 09:44) (56 - 65)  BP: 129/65 (01 Mar 2019 09:27) (100/- - 139/63)  BP(mean): 91 (01 Mar 2019 09:27) (76 - 91)  ABP: --  ABP(mean): --  RR: 17 (01 Mar 2019 09:44) (14 - 20)  SpO2: 93% (01 Mar 2019 09:44) (83% - 100%)    T(C): 36.9 (28 Feb 2019 07:48), Max: 36.9 (28 Feb 2019 07:48)  T(F): 98.4 (28 Feb 2019 07:48), Max: 98.4 (28 Feb 2019 07:48)  HR: 62 (28 Feb 2019 07:48) (55 - 62)  BP: 130/74 (28 Feb 2019 07:48) (127/56 - 138/68)  BP(mean): --  RR: 19 (28 Feb 2019 07:48) (18 - 19)  SpO2: 98% (28 Feb 2019 08:36) (96% - 100%)          PHYSICAL EXAM:    HEENT blind right eye  02 by mask  Lungs right chest tube, no wheeze  CVS no rub  Abdomin obese  Ext. Edema ext and body wall   po with I&O noted  Neuro alert, verbal      LABS:     03-02    140  |  96<L>  |  95.0<H>  ----------------------------<  113  5.0   |  34.0<H>  |  1.69<H>    Ca    8.3<L>      02 Mar 2019 03:04  Phos  3.3     03-02  Mg     2.6     03-02    TPro  6.1<L>  /  Alb  2.5<L>  /  TBili  1.2  /  DBili  x   /  AST  17  /  ALT  17  /  AlkPhos  123<H>  03-02                          8.1    4.5   )-----------( 166      ( 28 Feb 2019 08:27 )             24.8     02-28    137  |  95<L>  |  96.0<H>  ----------------------------<  192<H>  5.2   |  35.0<H>  |  1.79<H>    Ca    8.3<L>      28 Feb 2019 08:27  Phos  3.2     02-28  Mg     2.7     02-28    TPro  7.0  /  Alb  x   /  TBili  x   /  DBili  x   /  AST  x   /  ALT  x   /  AlkPhos  x   02-28        Magnesium, Serum: 2.7 mg/dL (02-28 @ 08:27)  Phosphorus Level, Serum: 3.2 mg/dL (02-28 @ 08:27)    ABG - ( 27 Feb 2019 12:01 )  pH, Arterial: 7.36  pH, Blood: x     /  pCO2: 66    /  pO2: 76    / HCO3: 34    / Base Excess: 10.4  /  SaO2: 96                    RADIOLOGY & ADDITIONAL TESTS:

## 2019-03-02 NOTE — PROGRESS NOTE ADULT - ASSESSMENT
ARF with prerenal on steroids; pulmonary Hypertension; Anemia; Chest  tube. Bladder retention. with UTI.

## 2019-03-02 NOTE — PROVIDER CONTACT NOTE (OTHER) - RECOMMENDATIONS
notified MD of changes, and she approved.  also asked if can try on n/c today if patient can tolerate it.  CO2 on abg greatly improved.  Initially was in 90's, last abg in 50's with full compensation.

## 2019-03-02 NOTE — PROVIDER CONTACT NOTE (OTHER) - ASSESSMENT
ST very uncomfortable for patient, with high leakage, on high IPAP settings.  Adjusted mask to small from large, decreased IPAP 20 24.  Leakage improved, Vt's 450-550, as desired.

## 2019-03-03 LAB
-  AMIKACIN: SIGNIFICANT CHANGE UP
-  AMPICILLIN/SULBACTAM: SIGNIFICANT CHANGE UP
-  AMPICILLIN: SIGNIFICANT CHANGE UP
-  AZTREONAM: SIGNIFICANT CHANGE UP
-  CEFAZOLIN: SIGNIFICANT CHANGE UP
-  CEFEPIME: SIGNIFICANT CHANGE UP
-  CEFOXITIN: SIGNIFICANT CHANGE UP
-  CEFTRIAXONE: SIGNIFICANT CHANGE UP
-  CIPROFLOXACIN: SIGNIFICANT CHANGE UP
-  ERTAPENEM: SIGNIFICANT CHANGE UP
-  GENTAMICIN: SIGNIFICANT CHANGE UP
-  IMIPENEM: SIGNIFICANT CHANGE UP
-  LEVOFLOXACIN: SIGNIFICANT CHANGE UP
-  MEROPENEM: SIGNIFICANT CHANGE UP
-  NITROFURANTOIN: SIGNIFICANT CHANGE UP
-  PIPERACILLIN/TAZOBACTAM: SIGNIFICANT CHANGE UP
-  TIGECYCLINE: SIGNIFICANT CHANGE UP
-  TOBRAMYCIN: SIGNIFICANT CHANGE UP
-  TRIMETHOPRIM/SULFAMETHOXAZOLE: SIGNIFICANT CHANGE UP
ALBUMIN SERPL ELPH-MCNC: 2.9 G/DL — LOW (ref 3.3–5.2)
ALP SERPL-CCNC: 180 U/L — HIGH (ref 40–120)
ALT FLD-CCNC: 20 U/L — SIGNIFICANT CHANGE UP
ANION GAP SERPL CALC-SCNC: 12 MMOL/L — SIGNIFICANT CHANGE UP (ref 5–17)
AST SERPL-CCNC: 22 U/L — SIGNIFICANT CHANGE UP
BILIRUB SERPL-MCNC: 1.5 MG/DL — SIGNIFICANT CHANGE UP (ref 0.4–2)
BUN SERPL-MCNC: 95 MG/DL — HIGH (ref 8–20)
CALCIUM SERPL-MCNC: 8.9 MG/DL — SIGNIFICANT CHANGE UP (ref 8.6–10.2)
CHLORIDE SERPL-SCNC: 92 MMOL/L — LOW (ref 98–107)
CO2 SERPL-SCNC: 36 MMOL/L — HIGH (ref 22–29)
CREAT SERPL-MCNC: 1.73 MG/DL — HIGH (ref 0.5–1.3)
CULTURE RESULTS: SIGNIFICANT CHANGE UP
EOSINOPHIL # BLD AUTO: 0 K/UL — SIGNIFICANT CHANGE UP (ref 0–0.5)
EOSINOPHIL NFR BLD AUTO: 0 % — SIGNIFICANT CHANGE UP (ref 0–6)
GLUCOSE BLDC GLUCOMTR-MCNC: 279 MG/DL — HIGH (ref 70–99)
GLUCOSE BLDC GLUCOMTR-MCNC: 281 MG/DL — HIGH (ref 70–99)
GLUCOSE BLDC GLUCOMTR-MCNC: 366 MG/DL — HIGH (ref 70–99)
GLUCOSE BLDC GLUCOMTR-MCNC: 379 MG/DL — HIGH (ref 70–99)
GLUCOSE SERPL-MCNC: 255 MG/DL — HIGH (ref 70–115)
HCT VFR BLD CALC: 25.1 % — LOW (ref 37–47)
HGB BLD-MCNC: 8.6 G/DL — LOW (ref 12–16)
LYMPHOCYTES # BLD AUTO: 0.4 K/UL — LOW (ref 1–4.8)
LYMPHOCYTES # BLD AUTO: 3.3 % — LOW (ref 20–55)
MAGNESIUM SERPL-MCNC: 2.6 MG/DL — SIGNIFICANT CHANGE UP (ref 1.6–2.6)
MCHC RBC-ENTMCNC: 30.6 PG — SIGNIFICANT CHANGE UP (ref 27–31)
MCHC RBC-ENTMCNC: 34.3 G/DL — SIGNIFICANT CHANGE UP (ref 32–36)
MCV RBC AUTO: 89.3 FL — SIGNIFICANT CHANGE UP (ref 81–99)
METHOD TYPE: SIGNIFICANT CHANGE UP
MONOCYTES # BLD AUTO: 0.2 K/UL — SIGNIFICANT CHANGE UP (ref 0–0.8)
MONOCYTES NFR BLD AUTO: 1.6 % — LOW (ref 3–10)
NEUTROPHILS # BLD AUTO: 10.6 K/UL — HIGH (ref 1.8–8)
NEUTROPHILS NFR BLD AUTO: 94.7 % — HIGH (ref 37–73)
ORGANISM # SPEC MICROSCOPIC CNT: SIGNIFICANT CHANGE UP
ORGANISM # SPEC MICROSCOPIC CNT: SIGNIFICANT CHANGE UP
PHOSPHATE SERPL-MCNC: 3.9 MG/DL — SIGNIFICANT CHANGE UP (ref 2.4–4.7)
PLATELET # BLD AUTO: 214 K/UL — SIGNIFICANT CHANGE UP (ref 150–400)
POTASSIUM SERPL-MCNC: 4.7 MMOL/L — SIGNIFICANT CHANGE UP (ref 3.5–5.3)
POTASSIUM SERPL-SCNC: 4.7 MMOL/L — SIGNIFICANT CHANGE UP (ref 3.5–5.3)
PROT SERPL-MCNC: 7 G/DL — SIGNIFICANT CHANGE UP (ref 6.6–8.7)
RBC # BLD: 2.81 M/UL — LOW (ref 4.4–5.2)
RBC # FLD: 16.9 % — HIGH (ref 11–15.6)
SODIUM SERPL-SCNC: 140 MMOL/L — SIGNIFICANT CHANGE UP (ref 135–145)
SPECIMEN SOURCE: SIGNIFICANT CHANGE UP
WBC # BLD: 11.2 K/UL — HIGH (ref 4.8–10.8)
WBC # FLD AUTO: 11.2 K/UL — HIGH (ref 4.8–10.8)

## 2019-03-03 PROCEDURE — 99233 SBSQ HOSP IP/OBS HIGH 50: CPT

## 2019-03-03 PROCEDURE — 99232 SBSQ HOSP IP/OBS MODERATE 35: CPT

## 2019-03-03 RX ORDER — ROBINUL 0.2 MG/ML
0.1 INJECTION INTRAMUSCULAR; INTRAVENOUS EVERY 8 HOURS
Qty: 0 | Refills: 0 | Status: DISCONTINUED | OUTPATIENT
Start: 2019-03-03 | End: 2019-03-26

## 2019-03-03 RX ADMIN — GABAPENTIN 100 MILLIGRAM(S): 400 CAPSULE ORAL at 05:25

## 2019-03-03 RX ADMIN — PANTOPRAZOLE SODIUM 40 MILLIGRAM(S): 20 TABLET, DELAYED RELEASE ORAL at 05:25

## 2019-03-03 RX ADMIN — MAGNESIUM OXIDE 400 MG ORAL TABLET 400 MILLIGRAM(S): 241.3 TABLET ORAL at 11:58

## 2019-03-03 RX ADMIN — ISOSORBIDE MONONITRATE 60 MILLIGRAM(S): 60 TABLET, EXTENDED RELEASE ORAL at 11:58

## 2019-03-03 RX ADMIN — Medication 60 MILLIGRAM(S): at 05:24

## 2019-03-03 RX ADMIN — GABAPENTIN 100 MILLIGRAM(S): 400 CAPSULE ORAL at 13:44

## 2019-03-03 RX ADMIN — SENNA PLUS 2 TABLET(S): 8.6 TABLET ORAL at 21:32

## 2019-03-03 RX ADMIN — Medication 3 MILLILITER(S): at 20:36

## 2019-03-03 RX ADMIN — Medication 100 MILLIGRAM(S): at 05:25

## 2019-03-03 RX ADMIN — HEPARIN SODIUM 5000 UNIT(S): 5000 INJECTION INTRAVENOUS; SUBCUTANEOUS at 05:25

## 2019-03-03 RX ADMIN — Medication 3: at 11:52

## 2019-03-03 RX ADMIN — Medication 3: at 08:08

## 2019-03-03 RX ADMIN — Medication 60 MILLIGRAM(S): at 13:45

## 2019-03-03 RX ADMIN — Medication 60 MILLIGRAM(S): at 21:32

## 2019-03-03 RX ADMIN — Medication 3 MILLILITER(S): at 02:54

## 2019-03-03 RX ADMIN — Medication 650 MILLIGRAM(S): at 18:51

## 2019-03-03 RX ADMIN — Medication 100 MILLIGRAM(S): at 18:21

## 2019-03-03 RX ADMIN — CEFTRIAXONE 100 GRAM(S): 500 INJECTION, POWDER, FOR SOLUTION INTRAMUSCULAR; INTRAVENOUS at 11:53

## 2019-03-03 RX ADMIN — Medication 40 MILLIGRAM(S): at 11:52

## 2019-03-03 RX ADMIN — CARVEDILOL PHOSPHATE 25 MILLIGRAM(S): 80 CAPSULE, EXTENDED RELEASE ORAL at 05:25

## 2019-03-03 RX ADMIN — INSULIN GLARGINE 10 UNIT(S): 100 INJECTION, SOLUTION SUBCUTANEOUS at 21:33

## 2019-03-03 RX ADMIN — CARVEDILOL PHOSPHATE 25 MILLIGRAM(S): 80 CAPSULE, EXTENDED RELEASE ORAL at 18:21

## 2019-03-03 RX ADMIN — Medication 3 MILLILITER(S): at 15:06

## 2019-03-03 RX ADMIN — AMLODIPINE BESYLATE 10 MILLIGRAM(S): 2.5 TABLET ORAL at 05:25

## 2019-03-03 RX ADMIN — DULOXETINE HYDROCHLORIDE 60 MILLIGRAM(S): 30 CAPSULE, DELAYED RELEASE ORAL at 11:57

## 2019-03-03 RX ADMIN — HEPARIN SODIUM 5000 UNIT(S): 5000 INJECTION INTRAVENOUS; SUBCUTANEOUS at 18:21

## 2019-03-03 RX ADMIN — ATORVASTATIN CALCIUM 80 MILLIGRAM(S): 80 TABLET, FILM COATED ORAL at 21:32

## 2019-03-03 RX ADMIN — GABAPENTIN 100 MILLIGRAM(S): 400 CAPSULE ORAL at 21:32

## 2019-03-03 RX ADMIN — Medication 81 MILLIGRAM(S): at 11:53

## 2019-03-03 RX ADMIN — POLYETHYLENE GLYCOL 3350 17 GRAM(S): 17 POWDER, FOR SOLUTION ORAL at 11:58

## 2019-03-03 RX ADMIN — Medication 3 MILLILITER(S): at 08:27

## 2019-03-03 RX ADMIN — Medication 5: at 16:39

## 2019-03-03 RX ADMIN — Medication 650 MILLIGRAM(S): at 18:21

## 2019-03-03 NOTE — CHART NOTE - NSCHARTNOTEFT_GEN_A_CORE
Source: Patient [ ]  Family [ x]   other [ ]    Current Diet: Diet, Consistent Carbohydrate Renal/No Snacks:   High Fiber  DASH/TLC {Sodium & Cholesteral Restricted} (02-23-19 @ 13:01)      Patient reports [ ] nausea  [ ] vomiting [ ] diarrhea [ ] constipation  [ ]chewing problems [ ] swallowing issues  [ ] other:     PO intake:  < 50% [ ]   50-75%  [x ]   %  [ ]  other :    Source for PO intake [ ] Patient [x ] family [ ] chart [ ] staff [ ] other    Enteral /Parenteral Nutrition:     Current Weight:     % Weight Change     Pertinent Medications: MEDICATIONS  (STANDING):  ALBUTerol    0.083% 10 milliGRAM(s) Nebulizer once  ALBUTerol/ipratropium for Nebulization 3 milliLiter(s) Nebulizer every 6 hours  amLODIPine   Tablet 10 milliGRAM(s) Oral daily  aspirin enteric coated 81 milliGRAM(s) Oral daily  atorvastatin 80 milliGRAM(s) Oral at bedtime  carvedilol 25 milliGRAM(s) Oral every 12 hours  cefTRIAXone   IVPB      cefTRIAXone   IVPB 1 Gram(s) IV Intermittent every 24 hours  dextrose 5%. 1000 milliLiter(s) (50 mL/Hr) IV Continuous <Continuous>  dextrose 50% Injectable 12.5 Gram(s) IV Push once  dextrose 50% Injectable 25 Gram(s) IV Push once  dextrose 50% Injectable 25 Gram(s) IV Push once  docusate sodium 100 milliGRAM(s) Oral two times a day  DULoxetine 60 milliGRAM(s) Oral daily  epoetin sara Injectable 02686 Unit(s) SubCutaneous <User Schedule>  gabapentin 100 milliGRAM(s) Oral three times a day  heparin  Injectable 5000 Unit(s) SubCutaneous every 12 hours  insulin glargine Injectable (LANTUS) 10 Unit(s) SubCutaneous at bedtime  insulin lispro (HumaLOG) corrective regimen sliding scale   SubCutaneous three times a day before meals  isosorbide   mononitrate ER Tablet (IMDUR) 60 milliGRAM(s) Oral daily  magnesium oxide 400 milliGRAM(s) Oral daily  methylPREDNISolone sodium succinate Injectable 60 milliGRAM(s) IV Push every 8 hours  pantoprazole    Tablet 40 milliGRAM(s) Oral before breakfast  polyethylene glycol 3350 17 Gram(s) Oral daily  senna 2 Tablet(s) Oral at bedtime  torsemide 40 milliGRAM(s) Oral daily    MEDICATIONS  (PRN):  acetaminophen   Tablet .. 650 milliGRAM(s) Oral every 6 hours PRN Mild Pain (1 - 3)  dextrose 40% Gel 15 Gram(s) Oral once PRN Blood Glucose LESS THAN 70 milliGRAM(s)/deciliter  glucagon  Injectable 1 milliGRAM(s) IntraMuscular once PRN Glucose LESS THAN 70 milligrams/deciliter  glycopyrrolate Injectable 0.02 milliGRAM(s) IV Push every 8 hours PRN secreations  guaiFENesin    Syrup 100 milliGRAM(s) Oral every 6 hours PRN Cough  midodrine 2.5 milliGRAM(s) Oral three times a day PRN systolic < 110  ondansetron Injectable 4 milliGRAM(s) IV Push every 6 hours PRN Nausea and/or Vomiting    Pertinent Labs: CBC Full  -  ( 03 Mar 2019 05:58 )  WBC Count : 11.2 K/uL  Hemoglobin : 8.6 g/dL  Hematocrit : 25.1 %  Platelet Count - Automated : 214 K/uL  Mean Cell Volume : 89.3 fl  Mean Cell Hemoglobin : 30.6 pg  Mean Cell Hemoglobin Concentration : 34.3 g/dL  Auto Neutrophil # : 10.6 K/uL  Auto Lymphocyte # : 0.4 K/uL  Auto Monocyte # : 0.2 K/uL  Auto Eosinophil # : 0.0 K/uL  Auto Basophil # : x  Auto Neutrophil % : 94.7 %  Auto Lymphocyte % : 3.3 %  Auto Monocyte % : 1.6 %  Auto Eosinophil % : 0.0 %  Auto Basophil % : x          Skin:     Nutrition focused physical exam conducted - found signs of malnutrition [ ]absent [ ]present    Subcutaneous fat loss: [ ] Orbital fat pads region, [ ]Buccal fat region, [ ]Triceps region,  [ ]Ribs region    Muscle wasting: [ ]Temples region, [ ]Clavicle region, [ ]Shoulder region, [ ]Scapula region, [ ]Interosseous region,  [ ]thigh region, [ ]Calf region    Estimated Needs:   [x ] no change since previous assessment  [ ] recalculated:     Current Nutrition Diagnosis:  Pt at nutritional risk secondary to moderate (acute) protein-calorie malnutrition related to inadequate protein-energy intake in the setting of poor appetite and medical intervention requiring intermittent NPO/CLD as evidenced by edema and pt  meeting <75% of protein-energy needs >7 days. Pt now reports improved po intake completing % of meals and tolerating diet well.   Recommendations:     Monitoring and Evaluation:   [ ] PO intake [ ] Tolerance to diet prescription [X] Weights  [X] Follow up per protocol [X] Labs:

## 2019-03-03 NOTE — PROGRESS NOTE ADULT - SUBJECTIVE AND OBJECTIVE BOX
PULMONARY PROGRESS NOTE      RANJIT [id number]    Patient is a 63y old  Female who presents with a chief complaint of SOB (03 Mar 2019 08:25)      INTERVAL HPI/OVERNIGHT EVENTS: Feeling better. On NCO2. Using bpap QHS. Less SOB.     MEDICATIONS  (STANDING):  ALBUTerol    0.083% 10 milliGRAM(s) Nebulizer once  ALBUTerol/ipratropium for Nebulization 3 milliLiter(s) Nebulizer every 6 hours  amLODIPine   Tablet 10 milliGRAM(s) Oral daily  aspirin enteric coated 81 milliGRAM(s) Oral daily  atorvastatin 80 milliGRAM(s) Oral at bedtime  carvedilol 25 milliGRAM(s) Oral every 12 hours  cefTRIAXone   IVPB      cefTRIAXone   IVPB 1 Gram(s) IV Intermittent every 24 hours  dextrose 5%. 1000 milliLiter(s) (50 mL/Hr) IV Continuous <Continuous>  dextrose 50% Injectable 12.5 Gram(s) IV Push once  dextrose 50% Injectable 25 Gram(s) IV Push once  dextrose 50% Injectable 25 Gram(s) IV Push once  docusate sodium 100 milliGRAM(s) Oral two times a day  DULoxetine 60 milliGRAM(s) Oral daily  epoetin sara Injectable 42121 Unit(s) SubCutaneous <User Schedule>  gabapentin 100 milliGRAM(s) Oral three times a day  heparin  Injectable 5000 Unit(s) SubCutaneous every 12 hours  insulin glargine Injectable (LANTUS) 10 Unit(s) SubCutaneous at bedtime  insulin lispro (HumaLOG) corrective regimen sliding scale   SubCutaneous three times a day before meals  isosorbide   mononitrate ER Tablet (IMDUR) 60 milliGRAM(s) Oral daily  magnesium oxide 400 milliGRAM(s) Oral daily  methylPREDNISolone sodium succinate Injectable 60 milliGRAM(s) IV Push every 8 hours  pantoprazole    Tablet 40 milliGRAM(s) Oral before breakfast  polyethylene glycol 3350 17 Gram(s) Oral daily  senna 2 Tablet(s) Oral at bedtime  torsemide 40 milliGRAM(s) Oral daily      MEDICATIONS  (PRN):  acetaminophen   Tablet .. 650 milliGRAM(s) Oral every 6 hours PRN Mild Pain (1 - 3)  dextrose 40% Gel 15 Gram(s) Oral once PRN Blood Glucose LESS THAN 70 milliGRAM(s)/deciliter  glucagon  Injectable 1 milliGRAM(s) IntraMuscular once PRN Glucose LESS THAN 70 milligrams/deciliter  guaiFENesin    Syrup 100 milliGRAM(s) Oral every 6 hours PRN Cough  midodrine 2.5 milliGRAM(s) Oral three times a day PRN systolic < 110  ondansetron Injectable 4 milliGRAM(s) IV Push every 6 hours PRN Nausea and/or Vomiting      Allergies    Accupril (Other)    Intolerances        PAST MEDICAL & SURGICAL HISTORY:  Herniated disc, cervical  PAD (peripheral artery disease)  Legally blind  History of peripheral vascular disease  History of retinal detachment  History of CEA (carotid endarterectomy): Right  Hyperlipidemia  Glaucoma  Hypertension  Diabetes  S/P CABG x 1  After cataract  Uveitic glaucoma of both eyes  Detached retina  Atherosclerosis of right carotid artery   delivery delivered                  Vital Signs Last 24 Hrs  T(C): 36.6 (03 Mar 2019 08:41), Max: 36.9 (03 Mar 2019 00:00)  T(F): 97.9 (03 Mar 2019 08:41), Max: 98.4 (03 Mar 2019 00:00)  HR: 68 (03 Mar 2019 08:29) (66 - 91)  BP: 114/60 (03 Mar 2019 07:33) (114/60 - 149/57)  BP(mean): 101 (03 Mar 2019 04:00) (78 - 101)  RR: 20 (03 Mar 2019 07:33) (13 - 21)  SpO2: 93% (03 Mar 2019 08:29) (93% - 98%)    PHYSICAL EXAMINATION:    GENERAL: The patient is awake and alert in no apparent distress.     HEENT: Head is normocephalic and atraumatic. Mucous membranes are moist.    NECK: Supple.    LUNGS: decreased bases, no rales, no wheeze, respirations unlabored    HEART: Regular rate and rhythm      ABDOMEN: Soft, nontender, and obese    EXTREMITIES: Without any cyanosis, clubbing, rash, lesions     NEUROLOGIC: Grossly intact.    LABS:                        8.6    11.2  )-----------( 214      ( 03 Mar 2019 05:58 )             25.1     03-03    140  |  92<L>  |  95.0<H>  ----------------------------<  255<H>  4.7   |  36.0<H>  |  1.73<H>    Ca    8.9      03 Mar 2019 05:58  Phos  3.9     03-03  Mg     2.6     03-03    TPro  7.0  /  Alb  2.9<L>  /  TBili  1.5  /  DBili  x   /  AST  22  /  ALT  20  /  AlkPhos  180<H>  03-        ABG - ( 01 Mar 2019 16:02 )  pH, Arterial: 7.42  pH, Blood: x     /  pCO2: 56    /  pO2: 105   / HCO3: 35    / Base Excess: 11.0  /  SaO2: 99

## 2019-03-03 NOTE — PROGRESS NOTE ADULT - SUBJECTIVE AND OBJECTIVE BOX
NEPHROLOGY INTERVAL HPI/OVERNIGHT EVENTS:    No new events.    MEDICATIONS  (STANDING):  ALBUTerol    0.083% 10 milliGRAM(s) Nebulizer once  ALBUTerol/ipratropium for Nebulization 3 milliLiter(s) Nebulizer every 6 hours  amLODIPine   Tablet 10 milliGRAM(s) Oral daily  aspirin enteric coated 81 milliGRAM(s) Oral daily  atorvastatin 80 milliGRAM(s) Oral at bedtime  carvedilol 25 milliGRAM(s) Oral every 12 hours  dextrose 5%. 1000 milliLiter(s) (50 mL/Hr) IV Continuous <Continuous>  dextrose 50% Injectable 12.5 Gram(s) IV Push once  dextrose 50% Injectable 25 Gram(s) IV Push once  dextrose 50% Injectable 25 Gram(s) IV Push once  docusate sodium 100 milliGRAM(s) Oral two times a day  DULoxetine 60 milliGRAM(s) Oral daily  epoetin sara Injectable 31509 Unit(s) SubCutaneous <User Schedule>  gabapentin 100 milliGRAM(s) Oral three times a day  heparin  Injectable 5000 Unit(s) SubCutaneous every 12 hours  insulin glargine Injectable (LANTUS) 10 Unit(s) SubCutaneous at bedtime  insulin lispro (HumaLOG) corrective regimen sliding scale   SubCutaneous three times a day before meals  insulin lispro Injectable (HumaLOG) 3 Unit(s) SubCutaneous three times a day with meals  isosorbide   mononitrate ER Tablet (IMDUR) 60 milliGRAM(s) Oral daily  magnesium oxide 400 milliGRAM(s) Oral daily  pantoprazole    Tablet 40 milliGRAM(s) Oral before breakfast  polyethylene glycol 3350 17 Gram(s) Oral daily  predniSONE   Tablet   Oral   senna 2 Tablet(s) Oral at bedtime    MEDICATIONS  (PRN):  acetaminophen   Tablet .. 650 milliGRAM(s) Oral every 6 hours PRN Mild Pain (1 - 3)  dextrose 40% Gel 15 Gram(s) Oral once PRN Blood Glucose LESS THAN 70 milliGRAM(s)/deciliter  glucagon  Injectable 1 milliGRAM(s) IntraMuscular once PRN Glucose LESS THAN 70 milligrams/deciliter  guaiFENesin    Syrup 100 milliGRAM(s) Oral every 6 hours PRN Cough  ondansetron Injectable 4 milliGRAM(s) IV Push every 6 hours PRN Nausea and/or Vomiting      Allergies    Accupril (Other)          Vital Signs Last 24 Hrs   T(C): 36.9 (03 Mar 2019 05:00), Max: 36.9 (03 Mar 2019 00:00)  T(F): 98.4 (03 Mar 2019 05:00), Max: 98.4 (03 Mar 2019 00:00)  HR: 66 (03 Mar 2019 07:33) (66 - 91)  BP: 114/60 (03 Mar 2019 07:33) (114/60 - 149/57)  BP(mean): 101 (03 Mar 2019 04:00) (78 - 101)  RR: 20 (03 Mar 2019 07:33) (13 - 21)  SpO2: 96% (03 Mar 2019 07:33) (94% - 98%)  T(C): 37.1 (02 Mar 2019 05:58), Max: 37.1 (02 Mar 2019 05:58)  T(F): 98.8 (02 Mar 2019 05:58), Max: 98.8 (02 Mar 2019 05:58)  HR: 78 (02 Mar 2019 07:42) (62 - 78)  BP: 146/47 (02 Mar 2019 07:42) (110/60 - 169/70)  BP(mean): 88 (02 Mar 2019 05:58) (79 - 121)  RR: 23 (02 Mar 2019 07:42) (7 - 31)  SpO2: 100% (02 Mar 2019 07:42) (78% - 100%)    T(C): 35.7 (01 Mar 2019 08:00), Max: 36.9 (28 Feb 2019 23:24)  T(F): 96.3 (01 Mar 2019 08:00), Max: 98.5 (28 Feb 2019 23:24)  HR: 62 (01 Mar 2019 09:44) (56 - 65)  BP: 129/65 (01 Mar 2019 09:27) (100/- - 139/63)  BP(mean): 91 (01 Mar 2019 09:27) (76 - 91)  ABP: --  ABP(mean): --  RR: 17 (01 Mar 2019 09:44) (14 - 20)  SpO2: 93% (01 Mar 2019 09:44) (83% - 100%)    T(C): 36.9 (28 Feb 2019 07:48), Max: 36.9 (28 Feb 2019 07:48)  T(F): 98.4 (28 Feb 2019 07:48), Max: 98.4 (28 Feb 2019 07:48)  HR: 62 (28 Feb 2019 07:48) (55 - 62)  BP: 130/74 (28 Feb 2019 07:48) (127/56 - 138/68)  BP(mean): --  RR: 19 (28 Feb 2019 07:48) (18 - 19)  SpO2: 98% (28 Feb 2019 08:36) (96% - 100%)          PHYSICAL EXAM:    HEENT blind right eye  02 by nasal canula  Lungs  no wheeze  CVS no rub, murmur same  Abdomin obese, not tender  Ext. Edema ext and body wall better  ALICIA fontenot with I&O noted  Neuro alert, verbal      LABS:     03-03    140  |  92<L>  |  95.0<H>  ----------------------------<  255<H>  4.7   |  36.0<H>  |  1.73<H>    Ca    8.9      03 Mar 2019 05:58  Phos  3.9     03-03  Mg     2.6     03-03    TPro  7.0  /  Alb  2.9<L>  /  TBili  1.5  /  DBili  x   /  AST  22  /  ALT  20  /  AlkPhos  180<H>  03-03      03-02    140  |  96<L>  |  95.0<H>  ----------------------------<  113  5.0   |  34.0<H>  |  1.69<H>    Ca    8.3<L>      02 Mar 2019 03:04  Phos  3.3     03-02  Mg     2.6     03-02    TPro  6.1<L>  /  Alb  2.5<L>  /  TBili  1.2  /  DBili  x   /  AST  17  /  ALT  17  /  AlkPhos  123<H>  03-02                          8.1    4.5   )-----------( 166      ( 28 Feb 2019 08:27 )             24.8     02-28    137  |  95<L>  |  96.0<H>  ----------------------------<  192<H>  5.2   |  35.0<H>  |  1.79<H>    Ca    8.3<L>      28 Feb 2019 08:27  Phos  3.2     02-28  Mg     2.7     02-28    TPro  7.0  /  Alb  x   /  TBili  x   /  DBili  x   /  AST  x   /  ALT  x   /  AlkPhos  x   02-28        Magnesium, Serum: 2.7 mg/dL (02-28 @ 08:27)  Phosphorus Level, Serum: 3.2 mg/dL (02-28 @ 08:27)    ABG - ( 27 Feb 2019 12:01 )  pH, Arterial: 7.36  pH, Blood: x     /  pCO2: 66    /  pO2: 76    / HCO3: 34    / Base Excess: 10.4  /  SaO2: 96                    RADIOLOGY & ADDITIONAL TESTS:

## 2019-03-03 NOTE — PROGRESS NOTE ADULT - ASSESSMENT
64 yo presenting to ED w/ SOB 2/2 diastolic HF exacerbation and noted to have KEITH and hyperkalemia, PMH CAD s/p CABG and PCI, PAD s/p baloon angioplasty, DM2, HTN, HLD, hx of cardiac arrest, recent GI bleed last month, Diastolic CHF, legally blind, pleural effusion requiring thoracentesis in Dec 2018.         >Chest pain with hx of CAD & CABG/PCI-  mild TnI elevation likely from demand ischemia & poor clearence. Disccussed with cardio. Will not follow further TnI. c/w Imdur, BB, statin, ASA. Plavix on hold since last admission due to GI Bleed.  1) Lower GI Bleed likely diverticular  - s/p 1 unit of PRBC  - GI Consult appreciated  - s/p Colonoscopy   ~ Poor prep throughout the colon. Diverticulosis seen. Unable to visualize colon sufficiently for poor prep  No further GI w/u recommended, ASA resumed as per GI, Plavix yet on hold  Hg increased from 7.8 to 8.0 today, no further reported bleeding. Clear for d/c as per GI  Advanced diet     2) Hyperkalemia  - Hold Losartan    3) Acute on Chronic Diastolic Heart Failure with severe PHTN  & right ventricular failure from OHS/ KYREE- now with pulm edema & pleural effusions  - Was initially on Lasix Infusion which is now off  c/w coreg & imdur.  - Hold Losartan due to hyperkalemia & now KEITH  - ECHO shows EF of > 75%. Moderate to Severe TR, mod-severe PHTN  RRT noted on 3/1 for hypotension & lethargy.  S/P 2/28 R pigtail  DNR/DNI    3/3 Maintain pigtail w/ increased output  Will continue to follow 64 yo presenting to ED w/ SOB 2/2 diastolic HF exacerbation and noted to have KEITH and hyperkalemia, PMH CAD s/p CABG and PCI, PAD s/p baloon angioplasty, DM2, HTN, HLD, hx of cardiac arrest, recent GI bleed last month, Diastolic CHF, legally blind, pleural effusion requiring thoracentesis in Dec 2018.      Acute on Chronic Diastolic Heart Failure with severe PHTN  & right ventricular failure from OHS/ KYREE- now with pulm edema & pleural effusions   S/P 2/28 R pigtail  DNR/DNI    3/3 Maintain pigtail w/ increased output  Will continue to follow

## 2019-03-03 NOTE — PROGRESS NOTE ADULT - SUBJECTIVE AND OBJECTIVE BOX
Subjective:  "Hello"  Multiple family members at bedside      V/S  T(C): 37 (19 @ 13:29), Max: 37 (19 @ 13:29)  HR: 73 (19 @ 15:07) (66 - 73)  BP: 112/80 (19 @ 12:00) (112/80 - 145/54)  RR: 18 (19 @ 12:00) (13 - 21)  SpO2: 94% (19 @ 15:07) (93% - 98%)      CHEST TUBE:          R posterior pigtail                 OUTPUT:  200  ml           per 24 hours     Drainage:  serosang        MEDICATIONS  (STANDING):  ALBUTerol    0.083% 10 milliGRAM(s) Nebulizer once  ALBUTerol/ipratropium for Nebulization 3 milliLiter(s) Nebulizer every 6 hours  amLODIPine   Tablet 10 milliGRAM(s) Oral daily  aspirin enteric coated 81 milliGRAM(s) Oral daily  atorvastatin 80 milliGRAM(s) Oral at bedtime  carvedilol 25 milliGRAM(s) Oral every 12 hours  cefTRIAXone   IVPB      cefTRIAXone   IVPB 1 Gram(s) IV Intermittent every 24 hours  dextrose 5%. 1000 milliLiter(s) (50 mL/Hr) IV Continuous <Continuous>  dextrose 50% Injectable 12.5 Gram(s) IV Push once  dextrose 50% Injectable 25 Gram(s) IV Push once  dextrose 50% Injectable 25 Gram(s) IV Push once  docusate sodium 100 milliGRAM(s) Oral two times a day  DULoxetine 60 milliGRAM(s) Oral daily  epoetin sara Injectable 11185 Unit(s) SubCutaneous <User Schedule>  gabapentin 100 milliGRAM(s) Oral three times a day  heparin  Injectable 5000 Unit(s) SubCutaneous every 12 hours  insulin glargine Injectable (LANTUS) 10 Unit(s) SubCutaneous at bedtime  insulin lispro (HumaLOG) corrective regimen sliding scale   SubCutaneous three times a day before meals  isosorbide   mononitrate ER Tablet (IMDUR) 60 milliGRAM(s) Oral daily  magnesium oxide 400 milliGRAM(s) Oral daily  methylPREDNISolone sodium succinate Injectable 60 milliGRAM(s) IV Push every 8 hours  pantoprazole    Tablet 40 milliGRAM(s) Oral before breakfast  polyethylene glycol 3350 17 Gram(s) Oral daily  senna 2 Tablet(s) Oral at bedtime  torsemide 40 milliGRAM(s) Oral daily          140  |  92<L>  |  95.0<H>  ----------------------------<  255<H>  4.7   |  36.0<H>  |  1.73<H>    Ca    8.9      03 Mar 2019 05:58  Phos  3.9       Mg     2.6         TPro  7.0  /  Alb  2.9<L>  /  TBili  1.5  /  DBili  x   /  AST  22  /  ALT  20  /  AlkPhos  180<H>                                 8.6    11.2  )-----------( 214      ( 03 Mar 2019 05:58 )             25.1                 CAPILLARY BLOOD GLUCOSE      POCT Blood Glucose.: 281 mg/dL (03 Mar 2019 11:49)  POCT Blood Glucose.: 279 mg/dL (03 Mar 2019 08:03)  POCT Blood Glucose.: 248 mg/dL (02 Mar 2019 22:30)  POCT Blood Glucose.: 242 mg/dL (02 Mar 2019 16:35)           CXR:      Physical Exam:    Neuro:     Pulm:     CV:    Abd:     Extremities:     Incision:              PAST MEDICAL & SURGICAL HISTORY:  Herniated disc, cervical  PAD (peripheral artery disease)  Legally blind  History of peripheral vascular disease  History of retinal detachment  History of CEA (carotid endarterectomy): Right  Hyperlipidemia  Glaucoma  Hypertension  Diabetes  S/P CABG x 1  After cataract  Uveitic glaucoma of both eyes  Detached retina  Atherosclerosis of right carotid artery   delivery delivered Subjective:  "Hello"  Multiple family members at bedside      V/S  T(C): 37 (19 @ 13:29), Max: 37 (19 @ 13:29)  HR: 73 (19 @ 15:07) (66 - 73)  BP: 112/80 (19 @ 12:00) (112/80 - 145/54)  RR: 18 (19 @ 12:00) (13 - 21)  SpO2: 94% (19 @ 15:07) (93% - 98%)      CHEST TUBE:          R posterior pigtail                 OUTPUT:  200  ml           per 24 hours     Drainage:  serosang        MEDICATIONS  (STANDING):  ALBUTerol    0.083% 10 milliGRAM(s) Nebulizer once  ALBUTerol/ipratropium for Nebulization 3 milliLiter(s) Nebulizer every 6 hours  amLODIPine   Tablet 10 milliGRAM(s) Oral daily  aspirin enteric coated 81 milliGRAM(s) Oral daily  atorvastatin 80 milliGRAM(s) Oral at bedtime  carvedilol 25 milliGRAM(s) Oral every 12 hours  cefTRIAXone   IVPB      cefTRIAXone   IVPB 1 Gram(s) IV Intermittent every 24 hours  dextrose 5%. 1000 milliLiter(s) (50 mL/Hr) IV Continuous <Continuous>  dextrose 50% Injectable 12.5 Gram(s) IV Push once  dextrose 50% Injectable 25 Gram(s) IV Push once  dextrose 50% Injectable 25 Gram(s) IV Push once  docusate sodium 100 milliGRAM(s) Oral two times a day  DULoxetine 60 milliGRAM(s) Oral daily  epoetin sara Injectable 58092 Unit(s) SubCutaneous <User Schedule>  gabapentin 100 milliGRAM(s) Oral three times a day  heparin  Injectable 5000 Unit(s) SubCutaneous every 12 hours  insulin glargine Injectable (LANTUS) 10 Unit(s) SubCutaneous at bedtime  insulin lispro (HumaLOG) corrective regimen sliding scale   SubCutaneous three times a day before meals  isosorbide   mononitrate ER Tablet (IMDUR) 60 milliGRAM(s) Oral daily  magnesium oxide 400 milliGRAM(s) Oral daily  methylPREDNISolone sodium succinate Injectable 60 milliGRAM(s) IV Push every 8 hours  pantoprazole    Tablet 40 milliGRAM(s) Oral before breakfast  polyethylene glycol 3350 17 Gram(s) Oral daily  senna 2 Tablet(s) Oral at bedtime  torsemide 40 milliGRAM(s) Oral daily          140  |  92<L>  |  95.0<H>  ----------------------------<  255<H>  4.7   |  36.0<H>  |  1.73<H>    Ca    8.9      03 Mar 2019 05:58  Phos  3.9       Mg     2.6         TPro  7.0  /  Alb  2.9<L>  /  TBili  1.5  /  DBili  x   /  AST  22  /  ALT  20  /  AlkPhos  180<H>                                 8.6    11.2  )-----------( 214      ( 03 Mar 2019 05:58 )             25.1                 CAPILLARY BLOOD GLUCOSE      POCT Blood Glucose.: 281 mg/dL (03 Mar 2019 11:49)  POCT Blood Glucose.: 279 mg/dL (03 Mar 2019 08:03)  POCT Blood Glucose.: 248 mg/dL (02 Mar 2019 22:30)  POCT Blood Glucose.: 242 mg/dL (02 Mar 2019 16:35)             Physical Exam:    Neuro: alert, answers questions    Pulm: diminished at bilateral lung fields  , supplemental O2 in use   R pigtail insitu> mod amt serousang fluid draining    CV: S1 S2   RRR    Abd: soft  non tender, + anasarca      Extremities: 1+ edema thighs to pedal              PAST MEDICAL & SURGICAL HISTORY:  Herniated disc, cervical  PAD (peripheral artery disease)  Legally blind  History of peripheral vascular disease  History of retinal detachment  History of CEA (carotid endarterectomy): Right  Hyperlipidemia  Glaucoma  Hypertension  Diabetes  S/P CABG x 1  After cataract  Uveitic glaucoma of both eyes  Detached retina  Atherosclerosis of right carotid artery   delivery delivered

## 2019-03-03 NOTE — PROGRESS NOTE ADULT - ASSESSMENT
62 yo presenting to ED w/ SOB 2/2 diastolic HF exacerbation and noted to have KEITH and hyperkalemia, PMH CAD s/p CABG and PCI, PAD s/p baloon angioplasty, DM2, HTN, HLD, hx of cardiac arrest, recent GI bleed last month, Diastolic CHF, legally blind, pleural effusion requiring thoracentesis in Dec 2018.         >Chest pain with hx of CAD & CABG/PCI-  mild TnI elevation likely from demand ischemia & poor clearence. Disccussed with cardio. Will not follow further TnI. c/w Imdur, BB, statin, ASA. Plavix on hold since last admission due to GI Bleed.  1) Lower GI Bleed likely diverticular  - s/p 1 unit of PRBC  - GI Consult appreciated  - s/p Colonoscopy   ~ Poor prep throughout the colon. Diverticulosis seen. Unable to visualize colon sufficiently for poor prep  No further GI w/u recommended, ASA resumed as per GI, Plavix yet on hold  Hg increased from 7.8 to 8.0 today, no further reported bleeding. Clear for d/c as per GI  Advanced diet     2) Hyperkalemia  - Hold Losartan    3) Acute on Chronic Diastolic Heart Failure with severe PHTN  & right ventricular failure from OHS/ KYREE- now with pulm edema & pleural effusions  - Was initially on Lasix Infusion which is now off  c/w coreg & imdur.  - Hold Losartan due to hyperkalemia & now KEITH  - ECHO shows EF of > 75%. Moderate to Severe TR, mod-severe PHTN  Cardio follow up appreciated  CXR on 2/24 with worsening pulm vasc congestion, increasing BNP,  Demadex 20 mg BID was switched to lasix 40mg IV q12 but was held for few days due to worsening kidney function & metabolic alkalosis from diuresis.     RRT noted on 3/1 for hypotension & lethargy. Pt became hypotensive after Bumex with albumin was given. Bumex on hold with IVF given by RRT team. SBP now in 110's so IVF d/em. CXR with worsening plum edema b/l. BiPAP placed. Bumex gtt ordered by renal. ICU consult called for vasopressor support during IV diuresis. Midodrine ordered  She became increasingly lethargic with ABG showing resp acidosis with hypoxia. Pt has been refusing AVAPS at night as per RT.  PO steroids changed to IV steroids.  Pulm called for re-consult. Pt has been on 4hrs of BiPAP. Repeat ABG ordered. Chest tube on right draining. Maintain chest tube per CTS  Bladder scan showing 700cc urine. Russo placed. Pt to remain DNR/DNI as per him but everything else to be done. Discussed with pt's daughter about pt's status & medical futility. I had a long discussion with family yesterday     Hypothermia-resolved- possible sepsis with UTI- on Rocephin   leukocytosis.    4) Acute on Chronic Hypercapnic Respiratory Failure due to OHS/KYREE   BiPAP was considered and tried without resolution of hypercarbia on admission   Pulm recommend chronic NIV for chronic hypercarbic respiratory failure to improve symptoms and to decrease hospitalizations;- Pt refusing NIV at times Nocturnal and PRN as per RT. Eventual SINGH with NIV  - Pulmonary consult appreciated  - Avoid Narcotics for pain    5) Acute on chronic kidney injury-stabel S creatinine around 1.75  ARF with prerenal on steroids; pulmonary Hypertension; Anemia; Chest  tube. Bladder retention. with UTI.  ct hold losartan   f/u BMP     6) CAD stents in 2014 / PAD / HLD / HTN  - Continue, Imdur, Lipitor, Coreg and Norvasc   - Plavix on hold since last admission due to GI Bleed. ASA resumed as per GI, plavix yet on hold    7) DM 2- elevated sugar due to steroids  - HbA1c 5.6  - Accu checks and ISS  - Increase Lantus to 10 units at bedtime, add Humalog 3U    8) Anemia  - Multifactorial (chronic, now complicated with acute bleeding)  - s/p Venofer   - Continue Procrit    9) Transaminitis- improved  - Likely secondary to liver congestion      10) History of Depression  - Continue Duloxetine 60 mg    11) B/L UE swelling  - Arm elevation  - Warm compresses   - No DVT     Elevated BUN- likely due to steroids    Encephalopathy due to resp acidosis- ABG noted. On BiPAP with improvement in lethargy.     DVT Prophylaxis -- heparin sc resumed, f/u H/H    Guarded prognosis

## 2019-03-03 NOTE — PROVIDER CONTACT NOTE (OTHER) - ACTION/TREATMENT ORDERED:
Dr Marcano -call thoracic and make aware  Thoracic NP- Aware, expecting large amounts
ok'd to adjust IPAP setting as above, and ok to trial patient off st.

## 2019-03-03 NOTE — PROGRESS NOTE ADULT - SUBJECTIVE AND OBJECTIVE BOX
[facility] RO[facility] Patient is a 63y old  Female who presents with a chief complaint of SOB (03 Mar 2019 10:45)     HPI:  Pt is a 62 yo presenting to ED w/ SOB, PMH CAD s/p CABG and PCI, PAD s/p baloon angioplasty, DM2, HTN, HLD, hx of cardiac arrest, recent GI bleed last month, Diastolic CHF, legally blind, pleural effusion requiring thoracentesis in Dec 2018.   Patient states that she has been having increasing SOB over the last week, she lives at home with her family, denies sick contacts, but has been having more exertional dyspnea. She states she walks with a walker but it is taking less activity for her dyspnea to occur. She has been compliant with all of her medications, she does not recall the names, but states that there have been no changes to her medication regimen since her discharge. She denies any associated chest pain or pressure. She has been taking her tosemide she believes. Denies PND or orthopnea, but she has noted increased LE edema.   Denies headaches, nausea, vomiting, chest pain, palpitations, abdominal pain, constipation, diarrhea, melena, hematochezia, dysuria.   Her SOB has since resolved. She is breathing comfortably, has no complaints.  In ED patient was noted to have hyperkalemia on repeat labs, troponin elevated at 0.09. Her creatinine is also elevated. She did not receive any potassium treatment initially so I ordered Calcium gluc, kayexelate, Insulin, dextrose, Albuterol. EKG without widened QRS or peaked T waves. (2019 03:58)    The patient was seen and evaluated   The patient is in no acute distress.  Denied any fever chest pain, palpitations, shortness of breath, abdominal pain, fever, dysuria, cough, edema   Complains of POST BIPAP, with OHS  now sitting up in bed eating eggs and bread with assistant at bedside     I&O's Summary    02 Mar 2019 07:01  -  03 Mar 2019 07:00  --------------------------------------------------------  IN: 795 mL / OUT: 7675 mL / NET: -6880 mL      Allergies    Accupril (Other)    Intolerances      HEALTH ISSUES - PROBLEM Dx:  Functional quadriplegia: Functional quadriplegia  Encounter for palliative care: Encounter for palliative care  KEITH (acute kidney injury): KEITH (acute kidney injury)  Acute on chronic diastolic (congestive) heart failure: Acute on chronic diastolic (congestive) heart failure  Pleural effusion: Pleural effusion  Hypertension: Hypertension  Rectal bleeding: Rectal bleeding  Blood per rectum: Blood per rectum  Failure to thrive in adult: Failure to thrive in adult  Chest pain: Chest pain  Prophylactic measure: Prophylactic measure  Acute on chronic congestive heart failure, unspecified heart failure type: Acute on chronic congestive heart failure, unspecified heart failure type  Acute on chronic respiratory failure with hypoxia and hypercapnia: Acute on chronic respiratory failure with hypoxia and hypercapnia  Obesity hypoventilation syndrome: Obesity hypoventilation syndrome  KYREE (obstructive sleep apnea): KYREE (obstructive sleep apnea)  Acute respiratory failure with hypercapnia: Acute respiratory failure with hypercapnia  CHF (congestive heart failure): CHF (congestive heart failure)  Acute on chronic right-sided congestive heart failure: Acute on chronic right-sided congestive heart failure  Type 2 diabetes mellitus without complication, without long-term current use of insulin: Type 2 diabetes mellitus without complication, without long-term current use of insulin  Hyperkalemia: Hyperkalemia  CAD (coronary artery disease): CAD (coronary artery disease)  Heart failure, acute diastolic: Heart failure, acute diastolic        PAST MEDICAL & SURGICAL HISTORY:  Herniated disc, cervical  PAD (peripheral artery disease)  Legally blind  History of peripheral vascular disease  History of retinal detachment  History of CEA (carotid endarterectomy): Right  Hyperlipidemia  Glaucoma  Hypertension  Diabetes  S/P CABG x 1  After cataract  Uveitic glaucoma of both eyes  Detached retina  Atherosclerosis of right carotid artery   delivery delivered          Vital Signs Last 24 Hrs  T(C): 36.6 (03 Mar 2019 08:41), Max: 36.9 (03 Mar 2019 00:00)  T(F): 97.9 (03 Mar 2019 08:41), Max: 98.4 (03 Mar 2019 00:00)  HR: 68 (03 Mar 2019 08:29) (66 - 91)  BP: 114/60 (03 Mar 2019 07:33) (114/60 - 149/57)  BP(mean): 101 (03 Mar 2019 04:00) (78 - 101)  RR: 20 (03 Mar 2019 07:33) (13 - 21)  SpO2: 93% (03 Mar 2019 08:29) (93% - 98%)T(C): 36.6 (19 @ 08:41), Max: 36.9 (19 @ 00:00)  HR: 68 (19 @ 08:29) (66 - 91)  BP: 114/60 (19 @ 07:33) (114/60 - 149/57)  RR: 20 (19 @ 07:33) (13 - 21)  SpO2: 93% (19 @ 08:29) (93% - 98%)  Wt(kg): --    PHYSICAL EXAM:    GENERAL: NAD, OBESE   HEAD:  Atraumatic, Normocephalic  EYES: EOMI, PERRLA,   NERVOUS SYSTEM:  Alert & Oriented X3,  Moves upper and lower extremities; CNS-II-XII  CHEST/LUNG: Clear to auscultation bilaterally; No rales, rhonchi, wheezing,   HEART: Regular rate and rhythm; No murmurs,   ABDOMEN: Soft, Nontender, Nondistended; Bowel sounds present  EXTREMITIES:  Peripheral Pulses, No  cyanosis, or edema    psychiatry- mood and affect appropriate Insight and judgement intact     acetaminophen   Tablet .. 650 milliGRAM(s) Oral every 6 hours PRN  ALBUTerol    0.083% 10 milliGRAM(s) Nebulizer once  ALBUTerol/ipratropium for Nebulization 3 milliLiter(s) Nebulizer every 6 hours  amLODIPine   Tablet 10 milliGRAM(s) Oral daily  aspirin enteric coated 81 milliGRAM(s) Oral daily  atorvastatin 80 milliGRAM(s) Oral at bedtime  carvedilol 25 milliGRAM(s) Oral every 12 hours  cefTRIAXone   IVPB      cefTRIAXone   IVPB 1 Gram(s) IV Intermittent every 24 hours  dextrose 40% Gel 15 Gram(s) Oral once PRN  dextrose 5%. 1000 milliLiter(s) IV Continuous <Continuous>  dextrose 50% Injectable 12.5 Gram(s) IV Push once  dextrose 50% Injectable 25 Gram(s) IV Push once  dextrose 50% Injectable 25 Gram(s) IV Push once  docusate sodium 100 milliGRAM(s) Oral two times a day  DULoxetine 60 milliGRAM(s) Oral daily  epoetin sara Injectable 42355 Unit(s) SubCutaneous <User Schedule>  gabapentin 100 milliGRAM(s) Oral three times a day  glucagon  Injectable 1 milliGRAM(s) IntraMuscular once PRN  guaiFENesin    Syrup 100 milliGRAM(s) Oral every 6 hours PRN  heparin  Injectable 5000 Unit(s) SubCutaneous every 12 hours  insulin glargine Injectable (LANTUS) 10 Unit(s) SubCutaneous at bedtime  insulin lispro (HumaLOG) corrective regimen sliding scale   SubCutaneous three times a day before meals  isosorbide   mononitrate ER Tablet (IMDUR) 60 milliGRAM(s) Oral daily  magnesium oxide 400 milliGRAM(s) Oral daily  methylPREDNISolone sodium succinate Injectable 60 milliGRAM(s) IV Push every 8 hours  midodrine 2.5 milliGRAM(s) Oral three times a day PRN  ondansetron Injectable 4 milliGRAM(s) IV Push every 6 hours PRN  pantoprazole    Tablet 40 milliGRAM(s) Oral before breakfast  polyethylene glycol 3350 17 Gram(s) Oral daily  senna 2 Tablet(s) Oral at bedtime  torsemide 40 milliGRAM(s) Oral daily      LABS:  ABG - ( 01 Mar 2019 16:02 )  pH, Arterial: 7.42  pH, Blood: x     /  pCO2: 56    /  pO2: 105   / HCO3: 35    / Base Excess: 11.0  /  SaO2: 99                                      8.6    11.2  )-----------( 214      ( 03 Mar 2019 05:58 )             25.1     03-03    140  |  92<L>  |  95.0<H>  ----------------------------<  255<H>  4.7   |  36.0<H>  |  1.73<H>    Ca    8.9      03 Mar 2019 05:58  Phos  3.9     03-03  Mg     2.6     03-03    TPro  7.0  /  Alb  2.9<L>  /  TBili  1.5  /  DBili  x   /  AST  22  /  ALT  20  /  AlkPhos  180<H>  -    LIVER FUNCTIONS - ( 03 Mar 2019 05:58 )  Alb: 2.9 g/dL / Pro: 7.0 g/dL / ALK PHOS: 180 U/L / ALT: 20 U/L / AST: 22 U/L / GGT: x                   CAPILLARY BLOOD GLUCOSE      POCT Blood Glucose.: 279 mg/dL (03 Mar 2019 08:03)  POCT Blood Glucose.: 248 mg/dL (02 Mar 2019 22:30)  POCT Blood Glucose.: 242 mg/dL (02 Mar 2019 16:35)      RADIOLOGY & ADDITIONAL TESTS:      Consultant notes reviewed    Case discussed with consultant/provider/ family /patient

## 2019-03-04 LAB
ALBUMIN SERPL ELPH-MCNC: 2.8 G/DL — LOW (ref 3.3–5.2)
ALP SERPL-CCNC: 155 U/L — HIGH (ref 40–120)
ALT FLD-CCNC: 16 U/L — SIGNIFICANT CHANGE UP
ANION GAP SERPL CALC-SCNC: 10 MMOL/L — SIGNIFICANT CHANGE UP (ref 5–17)
ANISOCYTOSIS BLD QL: SLIGHT — SIGNIFICANT CHANGE UP
AST SERPL-CCNC: 13 U/L — SIGNIFICANT CHANGE UP
BILIRUB SERPL-MCNC: 1.3 MG/DL — SIGNIFICANT CHANGE UP (ref 0.4–2)
BLD GP AB SCN SERPL QL: SIGNIFICANT CHANGE UP
BUN SERPL-MCNC: 95 MG/DL — HIGH (ref 8–20)
CALCIUM SERPL-MCNC: 8.8 MG/DL — SIGNIFICANT CHANGE UP (ref 8.6–10.2)
CHLORIDE SERPL-SCNC: 92 MMOL/L — LOW (ref 98–107)
CO2 SERPL-SCNC: 39 MMOL/L — HIGH (ref 22–29)
CREAT SERPL-MCNC: 1.85 MG/DL — HIGH (ref 0.5–1.3)
ELLIPTOCYTES BLD QL SMEAR: SLIGHT — SIGNIFICANT CHANGE UP
GLUCOSE BLDC GLUCOMTR-MCNC: 373 MG/DL — HIGH (ref 70–99)
GLUCOSE BLDC GLUCOMTR-MCNC: 384 MG/DL — HIGH (ref 70–99)
GLUCOSE BLDC GLUCOMTR-MCNC: 399 MG/DL — HIGH (ref 70–99)
GLUCOSE BLDC GLUCOMTR-MCNC: 407 MG/DL — HIGH (ref 70–99)
GLUCOSE SERPL-MCNC: 383 MG/DL — HIGH (ref 70–115)
HCT VFR BLD CALC: 24.3 % — LOW (ref 37–47)
HGB BLD-MCNC: 8.1 G/DL — LOW (ref 12–16)
HYPOCHROMIA BLD QL: SLIGHT — SIGNIFICANT CHANGE UP
LYMPHOCYTES # BLD AUTO: 2 % — LOW (ref 20–55)
MACROCYTES BLD QL: SLIGHT — SIGNIFICANT CHANGE UP
MCHC RBC-ENTMCNC: 29.2 PG — SIGNIFICANT CHANGE UP (ref 27–31)
MCHC RBC-ENTMCNC: 33.3 G/DL — SIGNIFICANT CHANGE UP (ref 32–36)
MCV RBC AUTO: 87.7 FL — SIGNIFICANT CHANGE UP (ref 81–99)
MICROCYTES BLD QL: SLIGHT — SIGNIFICANT CHANGE UP
MONOCYTES NFR BLD AUTO: 1 % — LOW (ref 3–10)
NEUTROPHILS NFR BLD AUTO: 97 % — HIGH (ref 37–73)
NON-GYNECOLOGICAL CYTOLOGY STUDY: SIGNIFICANT CHANGE UP
OVALOCYTES BLD QL SMEAR: SLIGHT — SIGNIFICANT CHANGE UP
PLAT MORPH BLD: NORMAL — SIGNIFICANT CHANGE UP
PLATELET # BLD AUTO: 196 K/UL — SIGNIFICANT CHANGE UP (ref 150–400)
POIKILOCYTOSIS BLD QL AUTO: SLIGHT — SIGNIFICANT CHANGE UP
POTASSIUM SERPL-MCNC: 4.1 MMOL/L — SIGNIFICANT CHANGE UP (ref 3.5–5.3)
POTASSIUM SERPL-SCNC: 4.1 MMOL/L — SIGNIFICANT CHANGE UP (ref 3.5–5.3)
PROT SERPL-MCNC: 6.6 G/DL — SIGNIFICANT CHANGE UP (ref 6.6–8.7)
RBC # BLD: 2.77 M/UL — LOW (ref 4.4–5.2)
RBC # FLD: 16.8 % — HIGH (ref 11–15.6)
RBC BLD AUTO: ABNORMAL
SODIUM SERPL-SCNC: 141 MMOL/L — SIGNIFICANT CHANGE UP (ref 135–145)
TYPE + AB SCN PNL BLD: SIGNIFICANT CHANGE UP
WBC # BLD: 9.5 K/UL — SIGNIFICANT CHANGE UP (ref 4.8–10.8)
WBC # FLD AUTO: 9.5 K/UL — SIGNIFICANT CHANGE UP (ref 4.8–10.8)

## 2019-03-04 PROCEDURE — 99232 SBSQ HOSP IP/OBS MODERATE 35: CPT

## 2019-03-04 PROCEDURE — 99233 SBSQ HOSP IP/OBS HIGH 50: CPT

## 2019-03-04 PROCEDURE — 99222 1ST HOSP IP/OBS MODERATE 55: CPT

## 2019-03-04 PROCEDURE — 71045 X-RAY EXAM CHEST 1 VIEW: CPT | Mod: 26

## 2019-03-04 RX ORDER — INSULIN LISPRO 100/ML
3 VIAL (ML) SUBCUTANEOUS
Qty: 0 | Refills: 0 | Status: DISCONTINUED | OUTPATIENT
Start: 2019-03-04 | End: 2019-03-17

## 2019-03-04 RX ORDER — PIPERACILLIN AND TAZOBACTAM 4; .5 G/20ML; G/20ML
3.38 INJECTION, POWDER, LYOPHILIZED, FOR SOLUTION INTRAVENOUS EVERY 12 HOURS
Qty: 0 | Refills: 0 | Status: DISCONTINUED | OUTPATIENT
Start: 2019-03-04 | End: 2019-03-04

## 2019-03-04 RX ORDER — PIPERACILLIN AND TAZOBACTAM 4; .5 G/20ML; G/20ML
3.38 INJECTION, POWDER, LYOPHILIZED, FOR SOLUTION INTRAVENOUS ONCE
Qty: 0 | Refills: 0 | Status: COMPLETED | OUTPATIENT
Start: 2019-03-04 | End: 2019-03-04

## 2019-03-04 RX ORDER — ALBUMIN HUMAN 25 %
50 VIAL (ML) INTRAVENOUS DAILY
Qty: 0 | Refills: 0 | Status: COMPLETED | OUTPATIENT
Start: 2019-03-04 | End: 2019-03-06

## 2019-03-04 RX ORDER — ERTAPENEM SODIUM 1 G/1
1000 INJECTION, POWDER, LYOPHILIZED, FOR SOLUTION INTRAMUSCULAR; INTRAVENOUS EVERY 24 HOURS
Qty: 0 | Refills: 0 | Status: DISCONTINUED | OUTPATIENT
Start: 2019-03-04 | End: 2019-03-11

## 2019-03-04 RX ADMIN — Medication 60 MILLIGRAM(S): at 16:16

## 2019-03-04 RX ADMIN — Medication 3 MILLILITER(S): at 09:07

## 2019-03-04 RX ADMIN — POLYETHYLENE GLYCOL 3350 17 GRAM(S): 17 POWDER, FOR SOLUTION ORAL at 11:23

## 2019-03-04 RX ADMIN — PIPERACILLIN AND TAZOBACTAM 200 GRAM(S): 4; .5 INJECTION, POWDER, LYOPHILIZED, FOR SOLUTION INTRAVENOUS at 16:15

## 2019-03-04 RX ADMIN — INSULIN GLARGINE 10 UNIT(S): 100 INJECTION, SOLUTION SUBCUTANEOUS at 22:06

## 2019-03-04 RX ADMIN — Medication 3 MILLILITER(S): at 15:21

## 2019-03-04 RX ADMIN — GABAPENTIN 100 MILLIGRAM(S): 400 CAPSULE ORAL at 15:05

## 2019-03-04 RX ADMIN — CEFTRIAXONE 100 GRAM(S): 500 INJECTION, POWDER, FOR SOLUTION INTRAMUSCULAR; INTRAVENOUS at 11:28

## 2019-03-04 RX ADMIN — Medication 100 MILLIGRAM(S): at 05:29

## 2019-03-04 RX ADMIN — Medication 3 MILLILITER(S): at 21:23

## 2019-03-04 RX ADMIN — Medication 50 MILLILITER(S): at 17:56

## 2019-03-04 RX ADMIN — PANTOPRAZOLE SODIUM 40 MILLIGRAM(S): 20 TABLET, DELAYED RELEASE ORAL at 05:30

## 2019-03-04 RX ADMIN — ERTAPENEM SODIUM 120 MILLIGRAM(S): 1 INJECTION, POWDER, LYOPHILIZED, FOR SOLUTION INTRAMUSCULAR; INTRAVENOUS at 22:06

## 2019-03-04 RX ADMIN — CARVEDILOL PHOSPHATE 25 MILLIGRAM(S): 80 CAPSULE, EXTENDED RELEASE ORAL at 17:57

## 2019-03-04 RX ADMIN — Medication 60 MILLIGRAM(S): at 22:05

## 2019-03-04 RX ADMIN — ISOSORBIDE MONONITRATE 60 MILLIGRAM(S): 60 TABLET, EXTENDED RELEASE ORAL at 11:23

## 2019-03-04 RX ADMIN — Medication 100 MILLIGRAM(S): at 17:58

## 2019-03-04 RX ADMIN — DULOXETINE HYDROCHLORIDE 60 MILLIGRAM(S): 30 CAPSULE, DELAYED RELEASE ORAL at 11:23

## 2019-03-04 RX ADMIN — Medication 60 MILLIGRAM(S): at 05:29

## 2019-03-04 RX ADMIN — Medication 3 MILLILITER(S): at 03:40

## 2019-03-04 RX ADMIN — Medication 81 MILLIGRAM(S): at 11:25

## 2019-03-04 RX ADMIN — HEPARIN SODIUM 5000 UNIT(S): 5000 INJECTION INTRAVENOUS; SUBCUTANEOUS at 05:30

## 2019-03-04 RX ADMIN — Medication 5: at 11:23

## 2019-03-04 RX ADMIN — MAGNESIUM OXIDE 400 MG ORAL TABLET 400 MILLIGRAM(S): 241.3 TABLET ORAL at 11:25

## 2019-03-04 RX ADMIN — Medication 6: at 16:19

## 2019-03-04 RX ADMIN — HEPARIN SODIUM 5000 UNIT(S): 5000 INJECTION INTRAVENOUS; SUBCUTANEOUS at 17:58

## 2019-03-04 RX ADMIN — GABAPENTIN 100 MILLIGRAM(S): 400 CAPSULE ORAL at 05:30

## 2019-03-04 RX ADMIN — Medication 5: at 08:03

## 2019-03-04 RX ADMIN — Medication 40 MILLIGRAM(S): at 05:30

## 2019-03-04 NOTE — PROGRESS NOTE ADULT - PROBLEM SELECTOR PLAN 6
Some improvement in mental status, though still frail with multiple medical issues.  Continuing to monitor hospital course.  Family has been discussed hospice services upon discharge given her hx of decline, frailty and medical issues. They wished to think about it. No definitive decisions currently.   Cont support.

## 2019-03-04 NOTE — PROGRESS NOTE ADULT - SUBJECTIVE AND OBJECTIVE BOX
Patient seen and examined    I&O's Summary    03 Mar 2019 07:01  -  04 Mar 2019 07:00  --------------------------------------------------------  IN: 1002.5 mL / OUT: 3900 mL / NET: -2897.5 mL    04 Mar 2019 07:01  -  04 Mar 2019 19:21  --------------------------------------------------------  IN: 530 mL / OUT: 1005 mL / NET: -475 mL    Feels better; on NCO2; Noct bipap    REVIEW OF SYSTEMS:    CONSTITUTIONAL: No F/C  RESPIRATORY: No cough,  No SOB at rest on NC  CARDIOVASCULAR: No CP/palpitations,    GASTROINTESTINAL: No abdominal pain  or NVD   GENITOURINARY: No UTI sx  NEUROLOGICAL: No headaches, NO wk/numbness  MUSCULOSKELETAL:   EXTREMITIES : Has chr swelling,    Vital Signs Last 24 Hrs  T(C): 36.3 (04 Mar 2019 16:13), Max: 37.1 (04 Mar 2019 05:00)  T(F): 97.3 (04 Mar 2019 16:13), Max: 98.8 (04 Mar 2019 05:00)  HR: 65 (04 Mar 2019 17:57) (61 - 70)  BP: 148/60 (04 Mar 2019 17:57) (91/66 - 148/60)  BP(mean): 74 (04 Mar 2019 04:00) (65 - 74)  RR: 12 (04 Mar 2019 17:57) (10 - 20)  SpO2: 100% (04 Mar 2019 17:57) (93% - 100%)    PHYSICAL EXAM:    GENERAL: NAD, Obese Pleasant  EYES:  conjunctiva and sclera clear  NECK: Supple, No JVD/Bruit  NERVOUS SYSTEM:  A/O x3,   CHEST:  No rales Scatt rhonchi  HEART:  RRR, No murmur  ABDOMEN: Soft, NT/ND BS+  EXTREMITIES:  + Edema;  SKIN: No rashes/stasis changes ++    LABS:                          8.1    9.5   )-----------( 196      ( 04 Mar 2019 07:29 )             24.3     03-04    141  |  92<L>  |  95.0<H>  ----------------------------<  383<H>  4.1   |  39.0<H>  |  1.85<H>    Ca    8.8      04 Mar 2019 07:29  Phos  3.9     03-03  Mg     2.6     03-03    TPro  6.6  /  Alb  2.8<L>  /  TBili  1.3  /  DBili  x   /  AST  13  /  ALT  16  /  AlkPhos  155<H>  03-04      MEDICATIONS  (STANDING):  acetaminophen   Tablet .. PRN  albumin human 25% IVPB  ALBUTerol    0.083%  ALBUTerol/ipratropium for Nebulization  amLODIPine   Tablet  aspirin enteric coated  atorvastatin  carvedilol  dextrose 40% Gel PRN  dextrose 5%.  dextrose 50% Injectable  dextrose 50% Injectable  dextrose 50% Injectable  docusate sodium  DULoxetine  epoetin sara Injectable  ertapenem  IVPB  gabapentin  glucagon  Injectable PRN  glycopyrrolate Injectable PRN  guaiFENesin    Syrup PRN  heparin  Injectable  insulin glargine Injectable (LANTUS)  insulin lispro (HumaLOG) corrective regimen sliding scale  insulin lispro Injectable (HumaLOG)  isosorbide   mononitrate ER Tablet (IMDUR)  magnesium oxide  methylPREDNISolone sodium succinate Injectable  midodrine PRN  ondansetron Injectable PRN  pantoprazole    Tablet  polyethylene glycol 3350  senna  torsemide

## 2019-03-04 NOTE — PROGRESS NOTE ADULT - SUBJECTIVE AND OBJECTIVE BOX
CC:  follow up GOC  INTERVAL HPI/OVERNIGHT EVENTS:  off Bipap  PRESENT SYMPTOMS: SOURCE:  Patient/Family/Team    PAIN SCALE:  0 = none  1 = mild   2 = moderate  3 = severe    Pain: denies    Dyspnea:  [ ] YES [ x] NO  Anxiety:  [ ] YES x[ ] NO  Fatigue: [ x] YES [ ] NO  Nausea: [ ] YES x[ ] NO  Loss of Appetite: [ x] YES [ ] NO  Other symptoms: __________    MEDICATIONS  (STANDING):  albumin human 25% IVPB 50 milliLiter(s) IV Intermittent daily  ALBUTerol    0.083% 10 milliGRAM(s) Nebulizer once  ALBUTerol/ipratropium for Nebulization 3 milliLiter(s) Nebulizer every 6 hours  amLODIPine   Tablet 10 milliGRAM(s) Oral daily  aspirin enteric coated 81 milliGRAM(s) Oral daily  atorvastatin 80 milliGRAM(s) Oral at bedtime  carvedilol 25 milliGRAM(s) Oral every 12 hours  dextrose 5%. 1000 milliLiter(s) (50 mL/Hr) IV Continuous <Continuous>  dextrose 50% Injectable 12.5 Gram(s) IV Push once  dextrose 50% Injectable 25 Gram(s) IV Push once  dextrose 50% Injectable 25 Gram(s) IV Push once  docusate sodium 100 milliGRAM(s) Oral two times a day  DULoxetine 60 milliGRAM(s) Oral daily  epoetin sara Injectable 48459 Unit(s) SubCutaneous <User Schedule>  gabapentin 100 milliGRAM(s) Oral three times a day  heparin  Injectable 5000 Unit(s) SubCutaneous every 12 hours  insulin glargine Injectable (LANTUS) 10 Unit(s) SubCutaneous at bedtime  insulin lispro (HumaLOG) corrective regimen sliding scale   SubCutaneous three times a day before meals  isosorbide   mononitrate ER Tablet (IMDUR) 60 milliGRAM(s) Oral daily  magnesium oxide 400 milliGRAM(s) Oral daily  methylPREDNISolone sodium succinate Injectable 60 milliGRAM(s) IV Push every 8 hours  pantoprazole    Tablet 40 milliGRAM(s) Oral before breakfast  piperacillin/tazobactam IVPB. 3.375 Gram(s) IV Intermittent once  piperacillin/tazobactam IVPB. 3.375 Gram(s) IV Intermittent every 12 hours  polyethylene glycol 3350 17 Gram(s) Oral daily  senna 2 Tablet(s) Oral at bedtime  torsemide 40 milliGRAM(s) Oral daily    MEDICATIONS  (PRN):  acetaminophen   Tablet .. 650 milliGRAM(s) Oral every 6 hours PRN Mild Pain (1 - 3)  dextrose 40% Gel 15 Gram(s) Oral once PRN Blood Glucose LESS THAN 70 milliGRAM(s)/deciliter  glucagon  Injectable 1 milliGRAM(s) IntraMuscular once PRN Glucose LESS THAN 70 milligrams/deciliter  glycopyrrolate Injectable 0.1 milliGRAM(s) IV Push every 8 hours PRN secreations  guaiFENesin    Syrup 100 milliGRAM(s) Oral every 6 hours PRN Cough  midodrine 2.5 milliGRAM(s) Oral three times a day PRN systolic < 110  ondansetron Injectable 4 milliGRAM(s) IV Push every 6 hours PRN Nausea and/or Vomiting      Allergies    Accupril (Other)    Intolerances    Karnofsky Performance Score/Palliative Performance Status Version 2: 30   %    Vital Signs Last 24 Hrs  T(C): 36.4 (04 Mar 2019 11:58), Max: 37.1 (04 Mar 2019 05:00)  T(F): 97.6 (04 Mar 2019 11:58), Max: 98.8 (04 Mar 2019 05:00)  HR: 64 (04 Mar 2019 15:22) (61 - 98)  BP: 118/60 (04 Mar 2019 11:22) (91/66 - 119/57)  BP(mean): 74 (04 Mar 2019 04:00) (65 - 74)  RR: 11 (04 Mar 2019 11:22) (10 - 20)  SpO2: 99% (04 Mar 2019 15:22) (93% - 99%)    PHYSICAL EXAM:    General: more awake and alert  NAD    HEENT: [ x] normal  [ ] dry mouth  [ ] ET tube/trach    Lungs: [x ] comfortable + pleurex  CV: [ x] normal  [ ] tachycardia    GI: [ x] normal  [ ] distended  [ ] tender  [ ] no BS               [ ] PEG/NG/OG tube    : [ x] normal  [ ] incontinent  [ ] oliguria/anuria  [ ] fontenot    MSK: [ ] normal  [ x weakness  [ ] edema             [ ] ambulatory  [x] bedbound/wheelchair bound    Skin: [ ] normal  [ ] pressure ulcers- Stage_____  [x] no rash    LABS:                        8.1    9.5   )-----------( 196      ( 04 Mar 2019 07:29 )             24.3     03-04    141  |  92<L>  |  95.0<H>  ----------------------------<  383<H>  4.1   |  39.0<H>  |  1.85<H>    Ca    8.8      04 Mar 2019 07:29  Phos  3.9     03-03  Mg     2.6     03-03    TPro  6.6  /  Alb  2.8<L>  /  TBili  1.3  /  DBili  x   /  AST  13  /  ALT  16  /  AlkPhos  155<H>  03-04        I&O's Summary    03 Mar 2019 07:01  -  04 Mar 2019 07:00  --------------------------------------------------------  IN: 1002.5 mL / OUT: 3900 mL / NET: -2897.5 mL    04 Mar 2019 07:01  -  04 Mar 2019 15:24  --------------------------------------------------------  IN: 530 mL / OUT: 1005 mL / NET: -475 mL        RADIOLOGY & ADDITIONAL STUDIES:  < from: CT Head No Cont (03.02.19 @ 12:42) >   EXAM:  CT BRAIN                          PROCEDURE DATE:  03/02/2019          INTERPRETATION:  CLINICAL HISTORY: Altered mental status    COMPARISON: CT head dated 12/17/2018    TECHNIQUE: Noncontrast CT of the head. Multiplanar reformations are   submitted.    FINDINGS:  There is periventricular and subcortical white matter hypodensity without   mass effect, nonspecific, likely representing moderate  chronic   microvascular ischemic changes. There is no compelling evidence for an   acute transcortical infarction. There is no evidence of mass, mass   effect, midline shift or extra-axial fluid collection. The lateral   ventricles and cortical sulci are age-appropriate in size and   configuration. Status post bilateral ocular globe silicone injection   banding, unchanged. The orbits, mastoid air cells and visualized   paranasal sinuses are otherwise unremarkable. The calvarium is intact.    IMPRESSION:  Moderate chronic microvascular changes without evidence of   an acute transcortical infarction or hemorrhage. MR is a more sensitive   imaging modality for the evaluation of an acute infarction.       < end of copied text >    Thank you for the opportunity to assist with the care of this patient.   [facility] [phone].

## 2019-03-04 NOTE — PROGRESS NOTE ADULT - PROBLEM SELECTOR PLAN 1
maintain pleural tube   CXR am  recommend replenishing albumin stores> attempts reduce pleural drainage  CXR am

## 2019-03-04 NOTE — PROGRESS NOTE ADULT - ASSESSMENT
62 yo presenting to ED w/ SOB 2/2 diastolic HF exacerbation and noted to have KEITH and hyperkalemia, PMH CAD s/p CABG and PCI, PAD s/p baloon angioplasty, DM2, HTN, HLD, hx of cardiac arrest, recent GI bleed last month, Diastolic CHF, legally blind, pleural effusion requiring thoracentesis in Dec 2018.     Has CKD - creat around 1.75 - watch on diuretics ; Off ARBS; K also improved    >Chest pain with hx of CAD & CABG/PCI-  mild TnI elevation likely from demand ischemia & poor clearence. Disccussed with cardio. Will not follow further TnI. c/w Imdur, BB, statin, ASA. Plavix on hold since last admission due to GI Bleed.  > Lower GI Bleed likely diverticular  - s/p 1 unit of PRBC  - s/p Colonoscopy     Acute on Chronic Diastolic Heart Failure with severe PHTN  & right ventricular failure from OHS/ KYREE- now with pulm edema & pleural effusions  - Was initially on Lasix Infusion which is now off  c/w coreg & imdur.  - ECHO shows EF of > 75%. Moderate to Severe TR, mod-severe PHTN  Cardio follow up appreciated  CXR on 2/24 with worsening pulm vasc congestion- lasix 40mg IV q12 but was held for few days due to worsening kidney function & metabolic alkalosis from diuresis.     Cont to watch Creat CO2 Lytes on diuretics

## 2019-03-04 NOTE — PROGRESS NOTE ADULT - SUBJECTIVE AND OBJECTIVE BOX
CC:   HPI:  Pt is a 64 yo presenting to ED w/ SOB, PMH CAD s/p CABG and PCI, PAD s/p baloon angioplasty, DM2, HTN, HLD, hx of cardiac arrest, recent GI bleed last month, Diastolic CHF, legally blind, pleural effusion requiring thoracentesis in Dec 2018.   Patient states that she has been having increasing SOB over the last week, she lives at home with her family, denies sick contacts, but has been having more exertional dyspnea. She states she walks with a walker but it is taking less activity for her dyspnea to occur. She has been compliant with all of her medications, she does not recall the names, but states that there have been no changes to her medication regimen since her discharge. She denies any associated chest pain or pressure. She has been taking her tosemide she believes. Denies PND or orthopnea, but she has noted increased LE edema.   Denies headaches, nausea, vomiting, chest pain, palpitations, abdominal pain, constipation, diarrhea, melena, hematochezia, dysuria.   Her SOB has since resolved. She is breathing comfortably, has no complaints.  In ED patient was noted to have hyperkalemia on repeat labs, troponin elevated at 0.09. Her creatinine is also elevated. She did not receive any potassium treatment initially so I ordered Calcium gluc, kayexelate, Insulin, dextrose, Albuterol. EKG without widened QRS or peaked T waves. (11 Feb 2019 03:58)    REVIEW OF SYSTEMS:    Patient denied fever, chills, abdominal pain, nausea, vomiting, cough, shortness of breath, chest pain or palpitations    Vital Signs Last 24 Hrs  T(C): 36.6 (04 Mar 2019 08:09), Max: 37.1 (04 Mar 2019 05:00)  T(F): 97.8 (04 Mar 2019 08:09), Max: 98.8 (04 Mar 2019 05:00)  HR: 63 (04 Mar 2019 11:22) (61 - 98)  BP: 118/60 (04 Mar 2019 11:22) (91/66 - 119/57)  BP(mean): 74 (04 Mar 2019 04:00) (65 - 74)  RR: 11 (04 Mar 2019 11:22) (10 - 20)  SpO2: 98% (04 Mar 2019 11:22) (93% - 98%)I&O's Summary    03 Mar 2019 07:01  -  04 Mar 2019 07:00  --------------------------------------------------------  IN: 1002.5 mL / OUT: 3900 mL / NET: -2897.5 mL    04 Mar 2019 07:01  -  04 Mar 2019 12:26  --------------------------------------------------------  IN: 530 mL / OUT: 1005 mL / NET: -475 mL    CAPILLARY BLOOD GLUCOSE    PHYSICAL EXAM:  GENERAL: NAD, well-groomed  HEENT: PERRL, +EOMI, anicteric, no [place]  NECK: Supple, No JVD   CHEST/LUNG: CTA bilaterally; Normal effort  HEART: S1S2 Normal intensity, no murmurs, gallops or rubs noted  ABDOMEN: Soft, BS Normoactive, NT, ND, no HSM noted  EXTREMITIES:  2+ radial and DP pulses noted, no clubbing, cyanosis, or edema noted, FROM x 4  SKIN: No rashes or lesions noted  NEURO: A&Ox3, no focal deficits noted, CN II-XII intact  PSYCH: normal mood and affect; insight/judgement appropriate  LABS:                        8.1    9.5   )-----------( 196      ( 04 Mar 2019 07:29 )             24.3     03-04    141  |  92<L>  |  95.0<H>  ----------------------------<  383<H>  4.1   |  39.0<H>  |  1.85<H>    Ca    8.8      04 Mar 2019 07:29  Phos  3.9     03-03  Mg     2.6     03-03    TPro  6.6  /  Alb  2.8<L>  /  TBili  1.3  /  DBili  x   /  AST  13  /  ALT  16  /  AlkPhos  155<H>  03-04        RADIOLOGY & ADDITIONAL TESTS:    MEDICATIONS:  MEDICATIONS  (STANDING):  ALBUTerol    0.083% 10 milliGRAM(s) Nebulizer once  ALBUTerol/ipratropium for Nebulization 3 milliLiter(s) Nebulizer every 6 hours  amLODIPine   Tablet 10 milliGRAM(s) Oral daily  aspirin enteric coated 81 milliGRAM(s) Oral daily  atorvastatin 80 milliGRAM(s) Oral at bedtime  carvedilol 25 milliGRAM(s) Oral every 12 hours  cefTRIAXone   IVPB      cefTRIAXone   IVPB 1 Gram(s) IV Intermittent every 24 hours  dextrose 5%. 1000 milliLiter(s) (50 mL/Hr) IV Continuous <Continuous>  dextrose 50% Injectable 12.5 Gram(s) IV Push once  dextrose 50% Injectable 25 Gram(s) IV Push once  dextrose 50% Injectable 25 Gram(s) IV Push once  docusate sodium 100 milliGRAM(s) Oral two times a day  DULoxetine 60 milliGRAM(s) Oral daily  epoetin sara Injectable 09767 Unit(s) SubCutaneous <User Schedule>  gabapentin 100 milliGRAM(s) Oral three times a day  heparin  Injectable 5000 Unit(s) SubCutaneous every 12 hours  insulin glargine Injectable (LANTUS) 10 Unit(s) SubCutaneous at bedtime  insulin lispro (HumaLOG) corrective regimen sliding scale   SubCutaneous three times a day before meals  isosorbide   mononitrate ER Tablet (IMDUR) 60 milliGRAM(s) Oral daily  magnesium oxide 400 milliGRAM(s) Oral daily  methylPREDNISolone sodium succinate Injectable 60 milliGRAM(s) IV Push every 8 hours  pantoprazole    Tablet 40 milliGRAM(s) Oral before breakfast  polyethylene glycol 3350 17 Gram(s) Oral daily  senna 2 Tablet(s) Oral at bedtime  torsemide 40 milliGRAM(s) Oral daily    MEDICATIONS  (PRN):  acetaminophen   Tablet .. 650 milliGRAM(s) Oral every 6 hours PRN Mild Pain (1 - 3)  dextrose 40% Gel 15 Gram(s) Oral once PRN Blood Glucose LESS THAN 70 milliGRAM(s)/deciliter  glucagon  Injectable 1 milliGRAM(s) IntraMuscular once PRN Glucose LESS THAN 70 milligrams/deciliter  glycopyrrolate Injectable 0.1 milliGRAM(s) IV Push every 8 hours PRN secreations  guaiFENesin    Syrup 100 milliGRAM(s) Oral every 6 hours PRN Cough  midodrine 2.5 milliGRAM(s) Oral three times a day PRN systolic < 110  ondansetron Injectable 4 milliGRAM(s) IV Push every 6 hours PRN Nausea and/or Vomiting Interval: No acute events. Chart and telemetry reviewed. Pt seen/examined by Attending and PA. NAD noted, sitting up in bed, only complaint is " I am coughing a lot."	         Vital Signs Last 24 Hrs  T(C): 36.6 (04 Mar 2019 08:09), Max: 37.1 (04 Mar 2019 05:00)  T(F): 97.8 (04 Mar 2019 08:09), Max: 98.8 (04 Mar 2019 05:00)  HR: 63 (04 Mar 2019 11:22) (61 - 98)  BP: 118/60 (04 Mar 2019 11:22) (91/66 - 119/57)  BP(mean): 74 (04 Mar 2019 04:00) (65 - 74)  RR: 11 (04 Mar 2019 11:22) (10 - 20)  SpO2: 98% (04 Mar 2019 11:22) (93% - 98%)I&O's Summary    03 Mar 2019 07:01  -  04 Mar 2019 07:00  --------------------------------------------------------  IN: 1002.5 mL / OUT: 3900 mL / NET: -2897.5 mL    04 Mar 2019 07:01  -  04 Mar 2019 12:26  --------------------------------------------------------  IN: 530 mL / OUT: 1005 mL / NET: -475 mL    CAPILLARY BLOOD GLUCOSE    PHYSICAL EXAM:  GENERAL: NAD, well-groomed  HEENT: PERRL, +EOMI, anicteric, no [place]  NECK: Supple, No JVD   CHEST/LUNG: CTA bilaterally; Normal effort  HEART: S1S2 Normal intensity, no murmurs, gallops or rubs noted  ABDOMEN: Soft, BS Normoactive, NT, ND, no HSM noted  EXTREMITIES:  2+ radial and DP pulses noted, no clubbing, cyanosis, or edema noted, FROM x 4  SKIN: No rashes or lesions noted  NEURO: A&Ox3, no focal deficits noted, CN II-XII intact  PSYCH: normal mood and affect; insight/judgement appropriate  LABS:                        8.1    9.5   )-----------( 196      ( 04 Mar 2019 07:29 )             24.3     03-04    141  |  92<L>  |  95.0<H>  ----------------------------<  383<H>  4.1   |  39.0<H>  |  1.85<H>    Ca    8.8      04 Mar 2019 07:29  Phos  3.9     03-03  Mg     2.6     03-03    TPro  6.6  /  Alb  2.8<L>  /  TBili  1.3  /  DBili  x   /  AST  13  /  ALT  16  /  AlkPhos  155<H>  03-04        RADIOLOGY & ADDITIONAL TESTS:    MEDICATIONS:  MEDICATIONS  (STANDING):  ALBUTerol    0.083% 10 milliGRAM(s) Nebulizer once  ALBUTerol/ipratropium for Nebulization 3 milliLiter(s) Nebulizer every 6 hours  amLODIPine   Tablet 10 milliGRAM(s) Oral daily  aspirin enteric coated 81 milliGRAM(s) Oral daily  atorvastatin 80 milliGRAM(s) Oral at bedtime  carvedilol 25 milliGRAM(s) Oral every 12 hours  cefTRIAXone   IVPB      cefTRIAXone   IVPB 1 Gram(s) IV Intermittent every 24 hours  dextrose 5%. 1000 milliLiter(s) (50 mL/Hr) IV Continuous <Continuous>  dextrose 50% Injectable 12.5 Gram(s) IV Push once  dextrose 50% Injectable 25 Gram(s) IV Push once  dextrose 50% Injectable 25 Gram(s) IV Push once  docusate sodium 100 milliGRAM(s) Oral two times a day  DULoxetine 60 milliGRAM(s) Oral daily  epoetin sara Injectable 46963 Unit(s) SubCutaneous <User Schedule>  gabapentin 100 milliGRAM(s) Oral three times a day  heparin  Injectable 5000 Unit(s) SubCutaneous every 12 hours  insulin glargine Injectable (LANTUS) 10 Unit(s) SubCutaneous at bedtime  insulin lispro (HumaLOG) corrective regimen sliding scale   SubCutaneous three times a day before meals  isosorbide   mononitrate ER Tablet (IMDUR) 60 milliGRAM(s) Oral daily  magnesium oxide 400 milliGRAM(s) Oral daily  methylPREDNISolone sodium succinate Injectable 60 milliGRAM(s) IV Push every 8 hours  pantoprazole    Tablet 40 milliGRAM(s) Oral before breakfast  polyethylene glycol 3350 17 Gram(s) Oral daily  senna 2 Tablet(s) Oral at bedtime  torsemide 40 milliGRAM(s) Oral daily    MEDICATIONS  (PRN):  acetaminophen   Tablet .. 650 milliGRAM(s) Oral every 6 hours PRN Mild Pain (1 - 3)  dextrose 40% Gel 15 Gram(s) Oral once PRN Blood Glucose LESS THAN 70 milliGRAM(s)/deciliter  glucagon  Injectable 1 milliGRAM(s) IntraMuscular once PRN Glucose LESS THAN 70 milligrams/deciliter  glycopyrrolate Injectable 0.1 milliGRAM(s) IV Push every 8 hours PRN secreations  guaiFENesin    Syrup 100 milliGRAM(s) Oral every 6 hours PRN Cough  midodrine 2.5 milliGRAM(s) Oral three times a day PRN systolic < 110  ondansetron Injectable 4 milliGRAM(s) IV Push every 6 hours PRN Nausea and/or Vomiting

## 2019-03-04 NOTE — PHARMACOTHERAPY INTERVENTION NOTE - COMMENTS
Patient on ceftriaxone for UTI. Urine culture results revealed E. coli resistant to ceftriaxone. Recommend discontinuation of ceftriaxone and initiation of Zosyn.

## 2019-03-04 NOTE — PROGRESS NOTE ADULT - ASSESSMENT
62 yo presenting to ED w/ SOB 2/2 diastolic HF exacerbation and noted to have KEITH and hyperkalemia, PMH CAD s/p CABG and PCI, PAD s/p baloon angioplasty, DM2, HTN, HLD, hx of cardiac arrest, recent GI bleed last month, Diastolic CHF, legally blind, pleural effusion requiring thoracentesis in Dec 2018.         >Chest pain with hx of CAD & CABG/PCI-  mild TnI elevation likely from demand ischemia & poor clearence. Disccussed with cardio. Will not follow further TnI. c/w Imdur, BB, statin, ASA. Plavix on hold since last admission due to GI Bleed.  1) Lower GI Bleed likely diverticular  - s/p 1 unit of PRBC  - GI Consult appreciated  - s/p Colonoscopy   ~ Poor prep throughout the colon. Diverticulosis seen. Unable to visualize colon sufficiently for poor prep  No further GI w/u recommended, ASA resumed as per GI, Plavix yet on hold  Hg increased from 7.8 to 8.0 today, no further reported bleeding. Clear for d/c as per GI  Advanced diet     2) Hyperkalemia  - Hold Losartan    3) Acute on Chronic Diastolic Heart Failure with severe PHTN  & right ventricular failure from OHS/ KYREE- now with pulm edema & pleural effusions  - Was initially on Lasix Infusion which is now off  c/w coreg & imdur.  - Hold Losartan due to hyperkalemia & now KEITH  - ECHO shows EF of > 75%. Moderate to Severe TR, mod-severe PHTN  Cardio follow up appreciated  CXR on 2/24 with worsening pulm vasc congestion, increasing BNP,  Demadex 20 mg BID was switched to lasix 40mg IV q12 but was held for few days due to worsening kidney function & metabolic alkalosis from diuresis.     RRT noted on 3/1 for hypotension & lethargy. Pt became hypotensive after Bumex with albumin was given. Bumex on hold with IVF given by RRT team. SBP now in 110's so IVF d/em. CXR with worsening plum edema b/l. BiPAP placed. Bumex gtt ordered by renal. ICU consult called for vasopressor support during IV diuresis. Midodrine ordered  She became increasingly lethargic with ABG showing resp acidosis with hypoxia. Pt has been refusing AVAPS at night as per RT.  PO steroids changed to IV steroids.  Pulm called for re-consult. Pt has been on 4hrs of BiPAP. Repeat ABG ordered. Chest tube on right draining. Maintain chest tube per CTS  Bladder scan showing 700cc urine. Russo placed. Pt to remain DNR/DNI as per him but everything else to be done. Discussed with pt's daughter about pt's status & medical futility. I had a long discussion with family yesterday     Hypothermia-resolved- possible sepsis with UTI- on Rocephin   leukocytosis.    4) Acute on Chronic Hypercapnic Respiratory Failure due to OHS/KYREE   BiPAP was considered and tried without resolution of hypercarbia on admission   Pulm recommend chronic NIV for chronic hypercarbic respiratory failure to improve symptoms and to decrease hospitalizations;- Pt refusing NIV at times Nocturnal and PRN as per RT. Eventual SINGH with NIV  - Pulmonary consult appreciated  - Avoid Narcotics for pain    5) Acute on chronic kidney injury-stabel S creatinine around 1.75  ARF with prerenal on steroids; pulmonary Hypertension; Anemia; Chest  tube. Bladder retention. with UTI.  ct hold losartan   f/u BMP     6) CAD stents in 2014 / PAD / HLD / HTN  - Continue, Imdur, Lipitor, Coreg and Norvasc   - Plavix on hold since last admission due to GI Bleed. ASA resumed as per GI, plavix yet on hold    7) DM 2- elevated sugar due to steroids  - HbA1c 5.6  - Accu checks and ISS  - Increase Lantus to 10 units at bedtime, add Humalog 3U    8) Anemia  - Multifactorial (chronic, now complicated with acute bleeding)  - s/p Venofer   - Continue Procrit    9) Transaminitis- improved  - Likely secondary to liver congestion      10) History of Depression  - Continue Duloxetine 60 mg    11) B/L UE swelling  - Arm elevation  - Warm compresses   - No DVT     Elevated BUN- likely due to steroids    Encephalopathy due to resp acidosis- ABG noted. On BiPAP with improvement in lethargy.     DVT Prophylaxis -- heparin sc resumed, f/u H/H    Guarded prognosis 64 yo presenting to ED w/ SOB 2/2 diastolic HF exacerbation and noted to have KEITH and hyperkalemia, PMH CAD s/p CABG and PCI, PAD s/p baloon angioplasty, DM2, HTN, HLD, hx of cardiac arrest, recent GI bleed last month, Diastolic CHF, legally blind, pleural effusion requiring thoracentesis in Dec 2018.         >Chest pain with hx of CAD & CABG/PCI-  mild TnI elevation likely from demand ischemia & poor clearence. Disccussed with cardio. Will not follow further TnI. c/w Imdur, BB, statin, ASA. Plavix on hold since last admission due to GI Bleed.  1) Lower GI Bleed likely diverticular  - s/p 1 unit of PRBC  - GI Consult appreciated  - s/p Colonoscopy   ~ Poor prep throughout the colon. Diverticulosis seen. Unable to visualize colon sufficiently for poor prep  No further GI w/u recommended, ASA resumed as per GI, Plavix yet on hold  Hg has remained stable, no further reported bleeding. Clear for d/c as per GI  Advanced diet     2) Hyperkalemia  - Hold Losartan    3) Acute on Chronic Diastolic Heart Failure with severe PHTN  & right ventricular failure from OHS/ KYREE- now with pulm edema & pleural effusions  - Was initially on Lasix Infusion which is now off  c/w coreg & imdur.  - Hold Losartan due to hyperkalemia & now KEITH  - ECHO shows EF of > 75%. Moderate to Severe TR, mod-severe PHTN  - Cardio follow up appreciated  - CXR on 2/24 with worsening pulm vasc congestion, increasing BNP,  Demadex 20 mg BID was switched to lasix 40mg IV q12 but is now held for few days due to worsening kidney function & metabolic alkalosis from diuresis. Now on   - Demadex 40 mg qd.  - Replenish Albumin stores     RRT noted on 3/1 for hypotension & lethargy. Pt became hypotensive after Bumex with albumin was given. Bumex on hold with IVF given by RRT team. SBP now in 110's so IVF d/em. CXR with worsening plum edema b/l. BiPAP placed. Bumex gtt ordered by renal. ICU consult called for vasopressor support during IV diuresis. Midodrine ordered  She became increasingly lethargic with ABG showing resp acidosis with hypoxia. Pt has been refusing AVAPS at night as per RT.  PO steroids changed to IV steroids.  Pulm called for re-consult. Pt has been on 4hrs of BiPAP. Repeat ABG ordered. Chest tube on right draining. Maintain chest tube per CTS  Bladder scan showing 700cc urine. Russo placed. Pt to remain DNR/DNI  but everything else to be done. Discussed with pt's daughter about pt's status & medical futility. Attending had a long discussion with family a few days ago.       4) Acute on Chronic Hypercapnic Respiratory Failure due to OHS/KYREE   BiPAP was considered and tried without resolution of hypercarbia on admission   Pulm recommend chronic NIV for chronic hypercarbic respiratory failure to improve symptoms and to decrease hospitalizations;- Pt refusing NIV at times Nocturnal and PRN as per RT. Eventual SINGH with NIV  - Pulmonary consult appreciated  - Avoid Narcotics for pain    5) Acute on chronic kidney injury  ARF with prerenal on steroids; pulmonary Hypertension; Anemia; Chest  tube. Bladder retention. with UTI.  ct hold losartan   f/u BMP     6) CAD stents in 2014 / PAD / HLD / HTN  - Continue, Imdur, Lipitor, Coreg and Norvasc   - Plavix on hold since last admission due to GI Bleed. ASA resumed as per GI, plavix yet on hold    7) DM 2- elevated sugar due to steroids  - HbA1c 5.6  - Accu checks and ISS  - Increase Lantus to 10 units at bedtime, add Humalog 3U    8) Anemia  - Multifactorial (chronic, now complicated with acute bleeding)  - s/p Venofer   - Continue Procrit    9) Transaminitis- improved  - Likely secondary to liver congestion    10) History of Depression  - Continue Duloxetine 60 mg    11) B/L UE swelling  - Arm elevation  - Warm compresses   - No DVT     Encephalopathy due to resp acidosis- ABG noted. Improves w AVAPS but again refused overnight     DVT Prophylaxis -- heparin sc resumed, f/u H/H    Guarded prognosis    Dispo: ABX changed from Ceftriaxone to Zosyn based on sensitivity, ID called to consult. CTS following, Pigtail to remain for now- increased drainage. Pleur-evac to remain for now. Pulmonary and Nephrology following. Bumex gtt now d/c'd, on Torsemide po, cont diuresis as BP and renal function allows. Albumin ordered to replenish stores

## 2019-03-04 NOTE — CONSULT NOTE ADULT - SUBJECTIVE AND OBJECTIVE BOX
[facility]  INFECTIOUS DISEASES AND INTERNAL MEDICINE at [place]  =======================================================  Matt De Jesus MD  Diplomates American Board of Internal Medicine and Infectious Diseases  =======================================================    MRN-[id number]  KUN ROCHA     CC:  SOB     HPI:  64 y/o woman was admitted with SOB on  to [facility]. She has diastolic HF, KEITH, HTN, DM2. CAD s/p CABG, PAD and persistent pleural effusion s/p chest tube, with recent positive urine culture. She was on ceftriaxone but due to R to ceftriaxone she was switched to zosyn. She is completely asymptomatic Feels better since chest was placed, SOB is better. Since has Fontenot catheter, doesn't have any urinary symptoms.     PAST MEDICAL & SURGICAL HISTORY:  Herniated disc, cervical  PAD (peripheral artery disease)  Legally blind  History of peripheral vascular disease  History of retinal detachment  History of CEA (carotid endarterectomy): Right  Hyperlipidemia  Glaucoma  Hypertension  Diabetes  S/P CABG x 1  After cataract  Uveitic glaucoma of both eyes  Detached retina  Atherosclerosis of right carotid artery   delivery delivered    Allergies  Accupril (Other)    Antibiotics:  piperacillin/tazobactam IVPB. 3.375 Gram(s) IV Intermittent every 12 hours     REVIEW OF SYSTEMS:  CONSTITUTIONAL:  No Fever or chills  HEENT:  No diplopia or blurred vision.  No sore throat or runny nose.  CARDIOVASCULAR:  No chest pain or SOB.  RESPIRATORY:  No cough, shortness of breath, PND or orthopnea.  GASTROINTESTINAL:  No nausea, vomiting or diarrhea.  GENITOURINARY:  No dysuria, frequency or urgency. No Blood in urine  MUSCULOSKELETAL:  no joint aches, no muscle pain  SKIN:  No change in skin, hair or nails.  NEUROLOGIC:  No paresthesias, fasciculations, seizures or weakness.  PSYCHIATRIC:  No disorder of thought or mood.  ENDOCRINE:  No heat or cold intolerance, polyuria or polydipsia.  HEMATOLOGICAL:  No easy bruising or bleeding.     Physical Exam:  Vital Signs Last 24 Hrs  T(C): 36.3 (04 Mar 2019 16:13), Max: 37.1 (04 Mar 2019 05:00)  T(F): 97.3 (04 Mar 2019 16:13), Max: 98.8 (04 Mar 2019 05:00)  HR: 64 (04 Mar 2019 15:22) (61 - 66)  BP: 118/60 (04 Mar 2019 11:22) (91/66 - 119/57)  BP(mean): 74 (04 Mar 2019 04:00) (65 - 74)  RR: 11 (04 Mar 2019 11:22) (10 - 20)  SpO2: 99% (04 Mar 2019 15:22) (93% - 99%)  GEN: NAD on O2 with NC  HEENT: normocephalic and atraumatic. Right eye blind   NECK: Supple.  No lymphadenopathy   LUNGS: decreased BS bilaterally more in R side with scattered crackles   HEART: Regular rate and rhythm   ABDOMEN: Soft, nontender, and nondistended.  Positive bowel sounds.    : No CVA tenderness, fontenot +   EXTREMITIES: No edema.  NEUROLOGIC: grossly intact.  PSYCHIATRIC: Appropriate affect .  SKIN: No ulceration or induration present.    Labs:      141  |  92<L>  |  95.0<H>  ----------------------------<  383<H>  4.1   |  39.0<H>  |  1.85<H>    Ca    8.8      04 Mar 2019 07:29  Phos  3.9       Mg     2.6         TPro  6.6  /  Alb  2.8<L>  /  TBili  1.3  /  DBili  x   /  AST  13  /  ALT  16  /  AlkPhos  155<H>                     8.1    9.5   )-----------( 196      ( 04 Mar 2019 07:29 )             24.3     LIVER FUNCTIONS - ( 04 Mar 2019 07:29 )  Alb: 2.8 g/dL / Pro: 6.6 g/dL / ALK PHOS: 155 U/L / ALT: 16 U/L / AST: 13 U/L / GGT: x           RECENT CULTURES:   @ 10:41 .Urine Escherichia coli    >100,000 CFU/ml Escherichia coli     @ 09:30 .Blood     No growth at 48 hours     @ 23:47 .Body Fluid     Culture is being performed.     @ 12:36 .Body Fluid     No growth at 4 days.  Culture in progress    No White blood cells  No organisms seen      All imaging and other data have been reviewed.

## 2019-03-04 NOTE — PROGRESS NOTE ADULT - SUBJECTIVE AND OBJECTIVE BOX
Subjective:      V/S  T(C): 36.6 (19 @ 08:09), Max: 37.1 (19 @ 05:00)  HR: 63 (19 @ 11:22) (61 - 98)  BP: 118/60 (19 @ 11:22) (91/66 - 119/57)  ABP: --  ABP(mean): --  RR: 11 (19 @ 11:22) (10 - 20)  SpO2: 98% (19 @ 11:22) (93% - 98%)  Wt(kg): --  CVP(mm Hg): --  CO: --  CI: --  PA: --                                                Tele:    CHEST TUBE:                           OUTPUT:             per 24 hours     Drainage:    AIR LEAKS:  [ ] YES [ ] NO      MEDICATIONS  (STANDING):  ALBUTerol    0.083% 10 milliGRAM(s) Nebulizer once  ALBUTerol/ipratropium for Nebulization 3 milliLiter(s) Nebulizer every 6 hours  amLODIPine   Tablet 10 milliGRAM(s) Oral daily  aspirin enteric coated 81 milliGRAM(s) Oral daily  atorvastatin 80 milliGRAM(s) Oral at bedtime  carvedilol 25 milliGRAM(s) Oral every 12 hours  cefTRIAXone   IVPB      cefTRIAXone   IVPB 1 Gram(s) IV Intermittent every 24 hours  dextrose 5%. 1000 milliLiter(s) (50 mL/Hr) IV Continuous <Continuous>  dextrose 50% Injectable 12.5 Gram(s) IV Push once  dextrose 50% Injectable 25 Gram(s) IV Push once  dextrose 50% Injectable 25 Gram(s) IV Push once  docusate sodium 100 milliGRAM(s) Oral two times a day  DULoxetine 60 milliGRAM(s) Oral daily  epoetin sara Injectable 01126 Unit(s) SubCutaneous <User Schedule>  gabapentin 100 milliGRAM(s) Oral three times a day  heparin  Injectable 5000 Unit(s) SubCutaneous every 12 hours  insulin glargine Injectable (LANTUS) 10 Unit(s) SubCutaneous at bedtime  insulin lispro (HumaLOG) corrective regimen sliding scale   SubCutaneous three times a day before meals  isosorbide   mononitrate ER Tablet (IMDUR) 60 milliGRAM(s) Oral daily  magnesium oxide 400 milliGRAM(s) Oral daily  methylPREDNISolone sodium succinate Injectable 60 milliGRAM(s) IV Push every 8 hours  pantoprazole    Tablet 40 milliGRAM(s) Oral before breakfast  polyethylene glycol 3350 17 Gram(s) Oral daily  senna 2 Tablet(s) Oral at bedtime  torsemide 40 milliGRAM(s) Oral daily          141  |  92<L>  |  95.0<H>  ----------------------------<  383<H>  4.1   |  39.0<H>  |  1.85<H>    Ca    8.8      04 Mar 2019 07:29  Phos  3.9       Mg     2.6         TPro  6.6  /  Alb  2.8<L>  /  TBili  1.3  /  DBili  x   /  AST  13  /  ALT  16  /  AlkPhos  155<H>                                 8.1    9.5   )-----------( 196      ( 04 Mar 2019 07:29 )             24.3                 CAPILLARY BLOOD GLUCOSE      POCT Blood Glucose.: 399 mg/dL (04 Mar 2019 08:02)  POCT Blood Glucose.: 379 mg/dL (03 Mar 2019 21:31)  POCT Blood Glucose.: 366 mg/dL (03 Mar 2019 16:36)  POCT Blood Glucose.: 281 mg/dL (03 Mar 2019 11:49)           CXR:      Physical Exam:    Neuro: alert, answers questions    Pulm: diminished at bilateral lung fields  , supplemental O2 in use   R pigtail insitu> mod amt serousang fluid draining    CV: S1 S2   RRR    Abd: soft  non tender, + anasarca      Extremities: 1+ edema thighs to pedal                PAST MEDICAL & SURGICAL HISTORY:  Herniated disc, cervical  PAD (peripheral artery disease)  Legally blind  History of peripheral vascular disease  History of retinal detachment  History of CEA (carotid endarterectomy): Right  Hyperlipidemia  Glaucoma  Hypertension  Diabetes  S/P CABG x 1  After cataract  Uveitic glaucoma of both eyes  Detached retina  Atherosclerosis of right carotid artery   delivery delivered Subjective: "Hello"  Supine in bed      V/S  T(C): 36.6 (19 @ 08:09), Max: 37.1 (19 @ 05:00)  HR: 63 (19 @ 11:22) (61 - 98)  BP: 118/60 (19 @ 11:22) (91/66 - 119/57)  RR: 11 (19 @ 11:22) (10 - 20)  SpO2: 98% (19 @ 11:22) (93% - 98%)      CHEST TUBE:      R post pigtail     OUTPUT:    700 ml         per 24 hours     Drainage:  serosang  AIR LEAKS:  [ ] YES [x ] NO      MEDICATIONS  (STANDING):  ALBUTerol    0.083% 10 milliGRAM(s) Nebulizer once  ALBUTerol/ipratropium for Nebulization 3 milliLiter(s) Nebulizer every 6 hours  amLODIPine   Tablet 10 milliGRAM(s) Oral daily  aspirin enteric coated 81 milliGRAM(s) Oral daily  atorvastatin 80 milliGRAM(s) Oral at bedtime  carvedilol 25 milliGRAM(s) Oral every 12 hours  cefTRIAXone   IVPB      cefTRIAXone   IVPB 1 Gram(s) IV Intermittent every 24 hours  dextrose 5%. 1000 milliLiter(s) (50 mL/Hr) IV Continuous <Continuous>  dextrose 50% Injectable 12.5 Gram(s) IV Push once  dextrose 50% Injectable 25 Gram(s) IV Push once  dextrose 50% Injectable 25 Gram(s) IV Push once  docusate sodium 100 milliGRAM(s) Oral two times a day  DULoxetine 60 milliGRAM(s) Oral daily  epoetin sara Injectable 47245 Unit(s) SubCutaneous <User Schedule>  gabapentin 100 milliGRAM(s) Oral three times a day  heparin  Injectable 5000 Unit(s) SubCutaneous every 12 hours  insulin glargine Injectable (LANTUS) 10 Unit(s) SubCutaneous at bedtime  insulin lispro (HumaLOG) corrective regimen sliding scale   SubCutaneous three times a day before meals  isosorbide   mononitrate ER Tablet (IMDUR) 60 milliGRAM(s) Oral daily  magnesium oxide 400 milliGRAM(s) Oral daily  methylPREDNISolone sodium succinate Injectable 60 milliGRAM(s) IV Push every 8 hours  pantoprazole    Tablet 40 milliGRAM(s) Oral before breakfast  polyethylene glycol 3350 17 Gram(s) Oral daily  senna 2 Tablet(s) Oral at bedtime  torsemide 40 milliGRAM(s) Oral daily          141  |  92<L>  |  95.0<H>  ----------------------------<  383<H>  4.1   |  39.0<H>  |  1.85<H>    Ca    8.8      04 Mar 2019 07:29  Phos  3.9       Mg     2.6         TPro  6.6  /  Alb  2.8<L>  /  TBili  1.3  /  DBili  x   /  AST  13  /  ALT  16  /  AlkPhos  155<H>                                 8.1    9.5   )-----------( 196      ( 04 Mar 2019 07:29 )             24.3                 CAPILLARY BLOOD GLUCOSE      POCT Blood Glucose.: 399 mg/dL (04 Mar 2019 08:02)  POCT Blood Glucose.: 379 mg/dL (03 Mar 2019 21:31)  POCT Blood Glucose.: 366 mg/dL (03 Mar 2019 16:36)  POCT Blood Glucose.: 281 mg/dL (03 Mar 2019 11:49)           CXR:      Physical Exam:    Neuro: alert, answers questions    Pulm: diminished at bilateral lung fields  , supplemental O2 in use   R pigtail insitu> mod amt serousang fluid draining    CV: S1 S2   RRR    Abd: soft  non tender, + anasarca      Extremities: 1+ edema thighs to pedal                PAST MEDICAL & SURGICAL HISTORY:  Herniated disc, cervical  PAD (peripheral artery disease)  Legally blind  History of peripheral vascular disease  History of retinal detachment  History of CEA (carotid endarterectomy): Right  Hyperlipidemia  Glaucoma  Hypertension  Diabetes  S/P CABG x 1  After cataract  Uveitic glaucoma of both eyes  Detached retina  Atherosclerosis of right carotid artery   delivery delivered

## 2019-03-04 NOTE — PROGRESS NOTE ADULT - ASSESSMENT
64 yo presenting to ED w/ SOB 2/2 diastolic HF exacerbation and noted to have KEITH and hyperkalemia, PMH CAD s/p CABG and PCI, PAD s/p baloon angioplasty, DM2, HTN, HLD, hx of cardiac arrest, recent GI bleed last month, Diastolic CHF, legally blind, pleural effusion requiring thoracentesis in Dec 2018.    Acute on Chronic Diastolic Heart Failure with severe PHTN  & right ventricular failure from OHS/ KYREE- now with pulm edema & pleural effusion     3/3 Maintain pigtail w/ increased output  Will continue to follow

## 2019-03-04 NOTE — CONSULT NOTE ADULT - ASSESSMENT
64 y/o woman was admitted with SOB on 2/11 to [facility]. She has diastolic HF, KEITH, HTN, DM2. CAD s/p CABG, PAD and persistent pleural effusion s/p chest tube, with recent positive urine culture with Ecoli that is resistant to ceftriaxone.     UTI  Pleural effusion, based on pleural fluid analysis transudate    - Blood culture neg  - UA with high WBC  - UC with Ecoli   - Even though S to zosyn, will switch to ertapenem since it is R to other betalactam/betalactamse combinations.   - Ertapenem 1gm daily (crcl>30)    Will follow up.

## 2019-03-05 DIAGNOSIS — N39.0 URINARY TRACT INFECTION, SITE NOT SPECIFIED: ICD-10-CM

## 2019-03-05 LAB
ANION GAP SERPL CALC-SCNC: 13 MMOL/L — SIGNIFICANT CHANGE UP (ref 5–17)
BUN SERPL-MCNC: 82 MG/DL — HIGH (ref 8–20)
CALCIUM SERPL-MCNC: 8.7 MG/DL — SIGNIFICANT CHANGE UP (ref 8.6–10.2)
CHLORIDE SERPL-SCNC: 92 MMOL/L — LOW (ref 98–107)
CO2 SERPL-SCNC: 37 MMOL/L — HIGH (ref 22–29)
CREAT SERPL-MCNC: 1.72 MG/DL — HIGH (ref 0.5–1.3)
CULTURE RESULTS: SIGNIFICANT CHANGE UP
FERRITIN SERPL-MCNC: 505 NG/ML — HIGH (ref 15–150)
GLUCOSE BLDC GLUCOMTR-MCNC: 223 MG/DL — HIGH (ref 70–99)
GLUCOSE BLDC GLUCOMTR-MCNC: 309 MG/DL — HIGH (ref 70–99)
GLUCOSE BLDC GLUCOMTR-MCNC: 372 MG/DL — HIGH (ref 70–99)
GLUCOSE BLDC GLUCOMTR-MCNC: 396 MG/DL — HIGH (ref 70–99)
GLUCOSE SERPL-MCNC: 373 MG/DL — HIGH (ref 70–115)
HCT VFR BLD CALC: 26.7 % — LOW (ref 37–47)
HGB BLD-MCNC: 8.9 G/DL — LOW (ref 12–16)
IRON SATN MFR SERPL: 27 % — SIGNIFICANT CHANGE UP (ref 14–50)
IRON SATN MFR SERPL: 66 UG/DL — SIGNIFICANT CHANGE UP (ref 37–145)
MCHC RBC-ENTMCNC: 29.3 PG — SIGNIFICANT CHANGE UP (ref 27–31)
MCHC RBC-ENTMCNC: 33.3 G/DL — SIGNIFICANT CHANGE UP (ref 32–36)
MCV RBC AUTO: 87.8 FL — SIGNIFICANT CHANGE UP (ref 81–99)
OB PNL STL: POSITIVE
PLATELET # BLD AUTO: 193 K/UL — SIGNIFICANT CHANGE UP (ref 150–400)
POTASSIUM SERPL-MCNC: 4.1 MMOL/L — SIGNIFICANT CHANGE UP (ref 3.5–5.3)
POTASSIUM SERPL-SCNC: 4.1 MMOL/L — SIGNIFICANT CHANGE UP (ref 3.5–5.3)
RBC # BLD: 3.04 M/UL — LOW (ref 4.4–5.2)
RBC # FLD: 16.5 % — HIGH (ref 11–15.6)
SODIUM SERPL-SCNC: 142 MMOL/L — SIGNIFICANT CHANGE UP (ref 135–145)
SPECIMEN SOURCE: SIGNIFICANT CHANGE UP
TIBC SERPL-MCNC: 247 UG/DL — SIGNIFICANT CHANGE UP (ref 220–430)
TRANSFERRIN SERPL-MCNC: 173 MG/DL — LOW (ref 192–382)
WBC # BLD: 8.6 K/UL — SIGNIFICANT CHANGE UP (ref 4.8–10.8)
WBC # FLD AUTO: 8.6 K/UL — SIGNIFICANT CHANGE UP (ref 4.8–10.8)

## 2019-03-05 PROCEDURE — 99232 SBSQ HOSP IP/OBS MODERATE 35: CPT

## 2019-03-05 PROCEDURE — 99231 SBSQ HOSP IP/OBS SF/LOW 25: CPT

## 2019-03-05 PROCEDURE — 99233 SBSQ HOSP IP/OBS HIGH 50: CPT

## 2019-03-05 RX ORDER — INSULIN LISPRO 100/ML
VIAL (ML) SUBCUTANEOUS
Qty: 0 | Refills: 0 | Status: DISCONTINUED | OUTPATIENT
Start: 2019-03-05 | End: 2019-03-15

## 2019-03-05 RX ORDER — INSULIN LISPRO 100/ML
VIAL (ML) SUBCUTANEOUS AT BEDTIME
Qty: 0 | Refills: 0 | Status: DISCONTINUED | OUTPATIENT
Start: 2019-03-05 | End: 2019-03-16

## 2019-03-05 RX ADMIN — PANTOPRAZOLE SODIUM 40 MILLIGRAM(S): 20 TABLET, DELAYED RELEASE ORAL at 05:16

## 2019-03-05 RX ADMIN — Medication 60 MILLIGRAM(S): at 05:17

## 2019-03-05 RX ADMIN — ISOSORBIDE MONONITRATE 60 MILLIGRAM(S): 60 TABLET, EXTENDED RELEASE ORAL at 12:09

## 2019-03-05 RX ADMIN — Medication 5: at 08:10

## 2019-03-05 RX ADMIN — Medication 8: at 17:36

## 2019-03-05 RX ADMIN — Medication 40 MILLIGRAM(S): at 05:16

## 2019-03-05 RX ADMIN — MAGNESIUM OXIDE 400 MG ORAL TABLET 400 MILLIGRAM(S): 241.3 TABLET ORAL at 12:08

## 2019-03-05 RX ADMIN — SENNA PLUS 2 TABLET(S): 8.6 TABLET ORAL at 21:13

## 2019-03-05 RX ADMIN — ATORVASTATIN CALCIUM 80 MILLIGRAM(S): 80 TABLET, FILM COATED ORAL at 21:13

## 2019-03-05 RX ADMIN — Medication 50 MILLILITER(S): at 17:35

## 2019-03-05 RX ADMIN — AMLODIPINE BESYLATE 10 MILLIGRAM(S): 2.5 TABLET ORAL at 05:16

## 2019-03-05 RX ADMIN — ATORVASTATIN CALCIUM 80 MILLIGRAM(S): 80 TABLET, FILM COATED ORAL at 05:16

## 2019-03-05 RX ADMIN — Medication 3 MILLILITER(S): at 20:36

## 2019-03-05 RX ADMIN — CARVEDILOL PHOSPHATE 25 MILLIGRAM(S): 80 CAPSULE, EXTENDED RELEASE ORAL at 17:37

## 2019-03-05 RX ADMIN — Medication 81 MILLIGRAM(S): at 12:08

## 2019-03-05 RX ADMIN — INSULIN GLARGINE 10 UNIT(S): 100 INJECTION, SOLUTION SUBCUTANEOUS at 21:14

## 2019-03-05 RX ADMIN — Medication 3 UNIT(S): at 12:08

## 2019-03-05 RX ADMIN — CARVEDILOL PHOSPHATE 25 MILLIGRAM(S): 80 CAPSULE, EXTENDED RELEASE ORAL at 05:16

## 2019-03-05 RX ADMIN — HEPARIN SODIUM 5000 UNIT(S): 5000 INJECTION INTRAVENOUS; SUBCUTANEOUS at 05:16

## 2019-03-05 RX ADMIN — Medication 40 MILLIGRAM(S): at 17:52

## 2019-03-05 RX ADMIN — GABAPENTIN 100 MILLIGRAM(S): 400 CAPSULE ORAL at 21:13

## 2019-03-05 RX ADMIN — Medication 3 UNIT(S): at 08:11

## 2019-03-05 RX ADMIN — POLYETHYLENE GLYCOL 3350 17 GRAM(S): 17 POWDER, FOR SOLUTION ORAL at 12:08

## 2019-03-05 RX ADMIN — Medication 100 MILLIGRAM(S): at 05:16

## 2019-03-05 RX ADMIN — ERTAPENEM SODIUM 120 MILLIGRAM(S): 1 INJECTION, POWDER, LYOPHILIZED, FOR SOLUTION INTRAMUSCULAR; INTRAVENOUS at 21:13

## 2019-03-05 RX ADMIN — Medication 3 MILLILITER(S): at 02:52

## 2019-03-05 RX ADMIN — DULOXETINE HYDROCHLORIDE 60 MILLIGRAM(S): 30 CAPSULE, DELAYED RELEASE ORAL at 12:08

## 2019-03-05 RX ADMIN — Medication 3 UNIT(S): at 17:36

## 2019-03-05 RX ADMIN — GABAPENTIN 100 MILLIGRAM(S): 400 CAPSULE ORAL at 13:25

## 2019-03-05 RX ADMIN — SENNA PLUS 2 TABLET(S): 8.6 TABLET ORAL at 05:17

## 2019-03-05 RX ADMIN — Medication 10: at 12:07

## 2019-03-05 RX ADMIN — HEPARIN SODIUM 5000 UNIT(S): 5000 INJECTION INTRAVENOUS; SUBCUTANEOUS at 17:37

## 2019-03-05 RX ADMIN — Medication 3 MILLILITER(S): at 08:38

## 2019-03-05 RX ADMIN — GABAPENTIN 100 MILLIGRAM(S): 400 CAPSULE ORAL at 05:16

## 2019-03-05 NOTE — PROGRESS NOTE ADULT - SUBJECTIVE AND OBJECTIVE BOX
Patient is a 63y old  Female who presents with a chief complaint of SOB (05 Mar 2019 12:23)      HPI:  Pt is a 64 yo presenting to ED w/ SOB, PMH CAD s/p CABG and PCI, PAD s/p baloon angioplasty, DM2, HTN, HLD, hx of cardiac arrest, recent GI bleed last month, Diastolic CHF, legally blind, pleural effusion requiring thoracentesis in Dec 2018.   Patient states that she has been having increasing SOB over the last week, she lives at home with her family, denies sick contacts, but has been having more exertional dyspnea. She states she walks with a walker but it is taking less activity for her dyspnea to occur. She has been compliant with all of her medications, she does not recall the names, but states that there have been no changes to her medication regimen since her discharge. She denies any associated chest pain or pressure. She has been taking her tosemide she believes. Denies PND or orthopnea, but she has noted increased LE edema.   Denies headaches, nausea, vomiting, chest pain, palpitations, abdominal pain, constipation, diarrhea, melena, hematochezia, dysuria.   Her SOB has since resolved. She is breathing comfortably, has no complaints.  In ED patient was noted to have hyperkalemia on repeat labs, troponin elevated at 0.09. Her creatinine is also elevated. She did not receive any potassium treatment initially so I ordered Calcium gluc, kayexelate, Insulin, dextrose, Albuterol. EKG without widened QRS or peaked T waves. (2019 03:58)    FAMILY HISTORY:      INTERVAL HPI/OVERNIGHT EVENTS:  Pt seen/examined by Attending and PA.  Pt. coughs occasionally, denies SOB, CP, abd.pain, N/V  ID on board  Palliative following      PAST MEDICAL & SURGICAL HISTORY:  Herniated disc, cervical  PAD (peripheral artery disease)  Legally blind  History of peripheral vascular disease  History of retinal detachment  History of CEA (carotid endarterectomy): Right  Hyperlipidemia  Glaucoma  Hypertension  Diabetes  S/P CABG x 1  After cataract  Uveitic glaucoma of both eyes  Detached retina  Atherosclerosis of right carotid artery   delivery delivered      Allergies    Accupril (Other)    Intolerances        Vital Signs Last 24 Hrs  T(C): 36.5 (05 Mar 2019 14:09), Max: 36.8 (05 Mar 2019 08:05)  T(F): 97.7 (05 Mar 2019 14:09), Max: 98.3 (05 Mar 2019 08:05)  HR: 64 (05 Mar 2019 12:05) (56 - 70)  BP: 113/98 (05 Mar 2019 12:05) (113/98 - 148/60)  BP(mean): 76 (05 Mar 2019 04:00) (76 - 80)  RR: 16 (05 Mar 2019 12:05) (4 - 22)  SpO2: 97% (05 Mar 2019 12:05) (96% - 100%)                                8.9    8.6   )-----------( 193      ( 05 Mar 2019 04:42 )             26.7     LIVER FUNCTIONS - ( 04 Mar 2019 07:29 )  Alb: 2.8 g/dL / Pro: 6.6 g/dL / ALK PHOS: 155 U/L / ALT: 16 U/L / AST: 13 U/L / GGT: x                 RADIOLOGY & ADDITIONAL STUDIES:    MEDICATIONS:  acetaminophen   Tablet .. 650 milliGRAM(s) Oral every 6 hours PRN  albumin human 25% IVPB 50 milliLiter(s) IV Intermittent daily  ALBUTerol    0.083% 10 milliGRAM(s) Nebulizer once  ALBUTerol/ipratropium for Nebulization 3 milliLiter(s) Nebulizer every 6 hours  amLODIPine   Tablet 10 milliGRAM(s) Oral daily  aspirin enteric coated 81 milliGRAM(s) Oral daily  atorvastatin 80 milliGRAM(s) Oral at bedtime  carvedilol 25 milliGRAM(s) Oral every 12 hours  dextrose 40% Gel 15 Gram(s) Oral once PRN  dextrose 5%. 1000 milliLiter(s) IV Continuous <Continuous>  dextrose 50% Injectable 12.5 Gram(s) IV Push once  dextrose 50% Injectable 25 Gram(s) IV Push once  dextrose 50% Injectable 25 Gram(s) IV Push once  docusate sodium 100 milliGRAM(s) Oral two times a day  DULoxetine 60 milliGRAM(s) Oral daily  epoetin sara Injectable 86625 Unit(s) SubCutaneous <User Schedule>  ertapenem  IVPB 1000 milliGRAM(s) IV Intermittent every 24 hours  gabapentin 100 milliGRAM(s) Oral three times a day  glucagon  Injectable 1 milliGRAM(s) IntraMuscular once PRN  glycopyrrolate Injectable 0.1 milliGRAM(s) IV Push every 8 hours PRN  guaiFENesin    Syrup 100 milliGRAM(s) Oral every 6 hours PRN  heparin  Injectable 5000 Unit(s) SubCutaneous every 12 hours  insulin glargine Injectable (LANTUS) 10 Unit(s) SubCutaneous at bedtime  insulin lispro (HumaLOG) corrective regimen sliding scale   SubCutaneous three times a day before meals  insulin lispro (HumaLOG) corrective regimen sliding scale   SubCutaneous at bedtime  insulin lispro Injectable (HumaLOG) 3 Unit(s) SubCutaneous three times a day before meals  isosorbide   mononitrate ER Tablet (IMDUR) 60 milliGRAM(s) Oral daily  magnesium oxide 400 milliGRAM(s) Oral daily  methylPREDNISolone sodium succinate Injectable 40 milliGRAM(s) IV Push two times a day  midodrine 2.5 milliGRAM(s) Oral three times a day PRN  ondansetron Injectable 4 milliGRAM(s) IV Push every 6 hours PRN  pantoprazole    Tablet 40 milliGRAM(s) Oral before breakfast  polyethylene glycol 3350 17 Gram(s) Oral daily  senna 2 Tablet(s) Oral at bedtime  torsemide 40 milliGRAM(s) Oral daily

## 2019-03-05 NOTE — PROGRESS NOTE ADULT - SUBJECTIVE AND OBJECTIVE BOX
Patient seen and examined    I&O's Summary    I&O's Summary    04 Mar 2019 07:01  -  05 Mar 2019 07:00  --------------------------------------------------------  IN: 870 mL / OUT: 3399 mL / NET: -2529 mL    05 Mar 2019 07:01  -  05 Mar 2019 15:04  --------------------------------------------------------  IN: 555 mL / OUT: 1180 mL / NET: -625 mL    Feels better; on NCO2; Noct bipap    REVIEW OF SYSTEMS:    CONSTITUTIONAL: No F/C  RESPIRATORY: No cough,  No SOB at rest on NC  CARDIOVASCULAR: No CP/palpitations,    GASTROINTESTINAL: No abdominal pain  or NVD   GENITOURINARY: No UTI sx  NEUROLOGICAL: No headaches, NO wk/numbness  MUSCULOSKELETAL:   EXTREMITIES : Has chr swelling,    Vital Signs Last 24 Hrs  T(C): 36.5 (05 Mar 2019 14:09), Max: 36.8 (05 Mar 2019 08:05)  T(F): 97.7 (05 Mar 2019 14:09), Max: 98.3 (05 Mar 2019 08:05)  HR: 64 (05 Mar 2019 12:05) (56 - 70)  BP: 113/98 (05 Mar 2019 12:05) (113/98 - 148/60)  BP(mean): 76 (05 Mar 2019 04:00) (76 - 80)  RR: 16 (05 Mar 2019 12:05) (4 - 22)  SpO2: 97% (05 Mar 2019 12:05) (96% - 100%)    PHYSICAL EXAM:    GENERAL: NAD, Obese Pleasant  EYES:  conjunctiva and sclera clear L eye; no vision R eye  NECK: Supple, No JVD/Bruit  NERVOUS SYSTEM:  A/O x3,   CHEST:  No rales Scatt rhonchi  HEART:  RRR, No murmur  ABDOMEN: Soft, NT/ND BS+  EXTREMITIES:  no Edema;  SKIN: No rashes/stasis changes ++    LABS:                          8.1    9.5   )-----------( 196      ( 04 Mar 2019 07:29 )             24.3     03-04                        8.9    8.6   )-----------( 193      ( 05 Mar 2019 04:42 )             26.7     141  |  92<L>  |  95.0<H>  ----------------------------<  383<H>  4.1   |  39.0<H>  |  1.85<H>    Ca    8.8      04 Mar 2019 07:29  Phos  3.9     03-03  Mg     2.6     03-03    TPro  6.6  /  Alb  2.8<L>  /  TBili  1.3  /  DBili  x   /  AST  13  /  ALT  16  /  AlkPhos  155<H>  03-04 03-05    142  |  92<L>  |  82.0<H>  ----------------------------<  373<H>  4.1   |  37.0<H>  |  1.72<H>    Ca    8.7      05 Mar 2019 04:42    TPro  6.6  /  Alb  2.8<L>  /  TBili  1.3  /  DBili  x   /  AST  13  /  ALT  16  /  AlkPhos  155<H>  03-04    MEDICATIONS  (STANDING):  acetaminophen   Tablet .. PRN  albumin human 25% IVPB  ALBUTerol    0.083%  ALBUTerol/ipratropium for Nebulization  amLODIPine   Tablet  aspirin enteric coated  atorvastatin  carvedilol  dextrose 40% Gel PRN  dextrose 5%.  dextrose 50% Injectable  dextrose 50% Injectable  dextrose 50% Injectable  docusate sodium  DULoxetine  epoetin sara Injectable  ertapenem  IVPB  gabapentin  glucagon  Injectable PRN  glycopyrrolate Injectable PRN  guaiFENesin    Syrup PRN  heparin  Injectable  insulin glargine Injectable (LANTUS)  insulin lispro (HumaLOG) corrective regimen sliding scale  insulin lispro Injectable (HumaLOG)  isosorbide   mononitrate ER Tablet (IMDUR)  magnesium oxide  methylPREDNISolone sodium succinate Injectable  midodrine PRN  ondansetron Injectable PRN  pantoprazole    Tablet  polyethylene glycol 3350  senna  torsemide

## 2019-03-05 NOTE — PROGRESS NOTE ADULT - ASSESSMENT
62 yo presenting to ED w/ SOB 2/2 diastolic HF exacerbation and noted to have KEITH and hyperkalemia, PMH CAD s/p CABG and PCI, PAD s/p baloon angioplasty, DM2, HTN, HLD, hx of cardiac arrest, recent GI bleed last month, Diastolic CHF, legally blind, pleural effusion requiring thoracentesis in Dec 2018.         >Chest pain with hx of CAD & CABG/PCI-  mild TnI elevation likely from demand ischemia & poor clearence. Disccussed with cardio. Will not follow further TnI. c/w Imdur, BB, statin, ASA. Plavix on hold since last admission due to GI Bleed.  1) Lower GI Bleed likely diverticular  - s/p 1 unit of PRBC  - GI Consult appreciated  - s/p Colonoscopy   ~ Poor prep throughout the colon. Diverticulosis seen. Unable to visualize colon sufficiently for poor prep  No further GI w/u recommended, ASA resumed as per GI, Plavix yet on hold  Hg has remained stable, no further reported bleeding. Clear for d/c as per GI  Advanced diet     2) Hyperkalemia  - Hold Losartan    3) Acute on Chronic Diastolic Heart Failure with severe PHTN  & right ventricular failure from OHS/ KYREE- now with pulm edema & pleural effusions  - Was initially on Lasix Infusion which is now off  c/w coreg & imdur.  - Hold Losartan due to hyperkalemia & now KEITH  - ECHO shows EF of > 75%. Moderate to Severe TR, mod-severe PHTN  - Cardio follow up appreciated  - CXR on 2/24 with worsening pulm vasc congestion, increasing BNP,  Demadex 20 mg BID was switched to lasix 40mg IV q12 but is now held for few days due to worsening kidney function & metabolic alkalosis from diuresis. Now on   - Demadex 40 mg qd.  - Replenish Albumin stores     RRT noted on 3/1 for hypotension & lethargy. Pt became hypotensive after Bumex with albumin was given. Bumex on hold with IVF given by RRT team. SBP now in 110's so IVF d/em. CXR with worsening plum edema b/l. BiPAP placed. Bumex gtt ordered by renal. ICU consult called for vasopressor support during IV diuresis. Midodrine ordered  She became increasingly lethargic with ABG showing resp acidosis with hypoxia. Pt has been refusing AVAPS at night as per RT.  PO steroids changed to IV steroids.  Pulm called for re-consult. Pt has been on 4hrs of BiPAP. Repeat ABG ordered. Chest tube on right draining. Maintain chest tube per CTS  Bladder scan showing 700cc urine. Russo placed. Pt to remain DNR/DNI  but everything else to be done. Discussed with pt's daughter about pt's status & medical futility. Attending had a long discussion with family a few days ago.       4) Acute on Chronic Hypercapnic Respiratory Failure due to OHS/KYREE   BiPAP was considered and tried without resolution of hypercarbia on admission   Pulm recommend chronic NIV for chronic hypercarbic respiratory failure to improve symptoms and to decrease hospitalizations;- Pt refusing NIV at times Nocturnal and PRN as per RT. Eventual SINGH with NIV  - Pulmonary consult appreciated  - Avoid Narcotics for pain    5) Acute on chronic kidney injury  ARF with prerenal on steroids; pulmonary Hypertension; Anemia; Chest  tube. Bladder retention. with UTI.  ct hold losartan   f/u BMP     6) CAD stents in 2014 / PAD / HLD / HTN  - Continue, Imdur, Lipitor, Coreg and Norvasc   - Plavix on hold since last admission due to GI Bleed. ASA resumed as per GI, plavix yet on hold    7) DM 2- elevated sugar due to steroids  - HbA1c 5.6  - Accu checks and ISS  - Increase Lantus to 10 units at bedtime, add Humalog 3U    8) Anemia  - Multifactorial (chronic, now complicated with acute bleeding)  - s/p Venofer   - Continue Procrit    9) Transaminitis- improved  - Likely secondary to liver congestion    10) History of Depression  - Continue Duloxetine 60 mg    11) B/L UE swelling  - Arm elevation  - Warm compresses   - No DVT     Encephalopathy due to resp acidosis- ABG noted. Improves w AVAPS but again refused overnight     DVT Prophylaxis -- heparin sc resumed, f/u H/H    Guarded prognosis 64 yo presenting to ED w/ SOB 2/2 diastolic HF exacerbation and noted to have KEITH and hyperkalemia, PMH CAD s/p CABG and PCI, PAD s/p baloon angioplasty, DM2, HTN, HLD, hx of cardiac arrest, recent GI bleed last month, Diastolic CHF, legally blind, pleural effusion requiring thoracentesis in Dec 2018.         >Chest pain with hx of CAD & CABG/PCI-  mild TnI elevation likely from demand ischemia & poor clearence. Disccussed with cardio. Will not follow further TnI. c/w Imdur, BB, statin, ASA. Plavix on hold since last admission due to GI Bleed.  1) Lower GI Bleed likely diverticular  - s/p 1 unit of PRBC  - GI Consult appreciated  - s/p Colonoscopy   ~ Poor prep throughout the colon. Diverticulosis seen. Unable to visualize colon sufficiently for poor prep  No further GI w/u recommended, ASA resumed as per GI, Plavix yet on hold  Hg has remained stable, no further reported bleeding. Clear for d/c as per GI  Advanced diet     2) Hyperkalemia  - Hold Losartan    3) Acute on Chronic Diastolic Heart Failure with severe PHTN  & right ventricular failure from OHS/ KYREE- now with pulm edema & pleural effusions  - Was initially on Lasix Infusion which is now off  c/w coreg & imdur.  - Hold Losartan due to hyperkalemia & now KEITH  - ECHO shows EF of > 75%. Moderate to Severe TR, mod-severe PHTN  - Cardio follow up appreciated  - CXR on 2/24 with worsening pulm vasc congestion, increasing BNP,  Demadex 20 mg BID was switched to lasix 40mg IV q12 but is now held for few days due to worsening kidney function & metabolic alkalosis from diuresis. Now on   - Demadex 40 mg qd.  - Replenish Albumin stores     RRT noted on 3/1 for hypotension & lethargy. Pt became hypotensive after Bumex with albumin was given. Bumex on hold with IVF given by RRT team. SBP now in 110's so IVF d/em. CXR with worsening plum edema b/l. BiPAP placed. Bumex gtt ordered by renal. ICU consult called for vasopressor support during IV diuresis. Midodrine ordered  She became increasingly lethargic with ABG showing resp acidosis with hypoxia. Pt has been refusing AVAPS at night as per RT.  PO steroids changed to IV steroids.  Pulm called for re-consult. Pt has been on 4hrs of BiPAP. Repeat ABG ordered. Chest tube on right draining. Maintain chest tube per CTS  Bladder scan showing 700cc urine. Russo placed. Pt to remain DNR/DNI  but everything else to be done. Discussed with pt's daughter about pt's status & medical futility. Attending had a long discussion with family a few days ago.       4) Acute on Chronic Hypercapnic Respiratory Failure due to OHS/KYREE   BiPAP was considered and tried without resolution of hypercarbia on admission   Pulm recommend chronic NIV for chronic hypercarbic respiratory failure to improve symptoms and to decrease hospitalizations;- Pt refusing NIV at times Nocturnal and PRN as per RT. Eventual SINGH with NIV  - Pulmonary consult appreciated  - Avoid Narcotics for pain    5) Acute on chronic kidney injury  ARF with prerenal on steroids; pulmonary Hypertension; Anemia; Chest  tube. Bladder retention. with UTI.  ct hold losartan   f/u BMP     6) CAD stents in 2014 / PAD / HLD / HTN  - Continue, Imdur, Lipitor, Coreg and Norvasc   - Plavix on hold since last admission due to GI Bleed. ASA resumed as per GI, plavix yet on hold    7) DM 2- elevated sugar due to steroids    - HbA1c 5.6  - Accu checks and ISS = BS still elevated in high 300s  - Lantus at 10 units at bedtime  - Humalog 3U was added yesterday  - Sliding scale changed to moderate coverage  - Steroids titrated to 40mg Q12H    8) Anemia  - Multifactorial (chronic, now complicated with acute bleeding)  - s/p Venofer   - Continue Procrit  + stool for occult blood (will need GI work-up as outpt)      9) Transaminitis- improved  - Likely secondary to liver congestion    10) History of Depression  - Continue Duloxetine 60 mg    11) B/L UE swelling  - Arm elevation  - Warm compresses   - No DVT     Encephalopathy due to resp acidosis- ABG noted. Improves w AVAPS but again refused overnight     DVT Prophylaxis -- heparin sc resumed, f/u H/H    Guarded prognosis  Will downgrade to 4T/telemetry  Nutritional supplement added 64 yo presenting to ED w/ SOB 2/2 diastolic HF exacerbation and noted to have KEITH and hyperkalemia, PMH CAD s/p CABG and PCI, PAD s/p baloon angioplasty, DM2, HTN, HLD, hx of cardiac arrest, recent GI bleed last month, Diastolic CHF, legally blind, pleural effusion requiring thoracentesis in Dec 2018, admitted for SOB, noted to have pleural effusion, started on bumex, s/p RRT noted on 3/1 for hypotension & lethargy. Pt became hypotensive after Bumex with albumin was given.  CXR with worsening plum edema b/l. BiPAP placed. ICU consult called for vasopressor support during IV diuresis. Midodrine ordered. She became increasingly lethargic with ABG showing resp acidosis with hypoxia. Pulmonary consult appreciated- started on IV steroids.     A/p    >Acute on Chronic Hypercapnic Respiratory Failure due to OHS/KYREE   Pulm recommend chronic NIV for chronic hypercarbic respiratory failure to improve symptoms and to decrease hospitalizations;- Pt refusing NIV at times Nocturnal and PRN as per RT. Eventual SINGH with NIV  - Pulmonary consult appreciated - taper steroid  - Avoid Narcotics for pain      >Chest pain with hx of CAD & CABG/PCI-  mild TnI elevation likely from demand ischemia & poor clearence. Disccussed with cardio. Will not follow further TnI. c/w Imdur, BB, statin, ASA. Plavix on hold since last admission due to GI Bleed.      > Lower GI Bleed likely diverticular  - s/p 1 unit of PRBC  - GI Consult appreciated  - s/p Colonoscopy   ~ Poor prep throughout the colon. Diverticulosis seen. Unable to visualize colon sufficiently for poor prep  No further GI w/u recommended, ASA resumed as per GI, Plavix yet on hold  Hg has remained stable, no further reported bleeding. Clear for d/c as per GI  Advanced diet     >Hyperkalemia  - Hold Losartan    > Acute on Chronic Diastolic Heart Failure with severe PHTN  & right ventricular failure from OHS/ KYREE- now with pulm edema & pleural effusions  - Was initially on Lasix Infusion which is now off  c/w coreg & imdur.  - Hold Losartan due to hyperkalemia & now KEITH  - ECHO shows EF of > 75%. Moderate to Severe TR, mod-severe PHTN  - Cardio follow up appreciated  - Demadex 40 mg qd.  - Replenish Albumin stores     >Pleural effusion  Chest tube on right draining. Maintain chest tube per CTS    > Acute on chronic kidney injury  ARF with prerenal on steroids; pulmonary Hypertension; Anemia; Chest  tube. Bladder retention. with UTI.  ct hold losartan   f/u BMP     > CAD stents in 2014 / PAD / HLD / HTN  - Continue, Imdur, Lipitor, Coreg and Norvasc   - Plavix on hold since last admission due to GI Bleed. ASA resumed as per GI, plavix yet on hold    > DM 2- elevated sugar due to steroids    - HbA1c 5.6  - Accu checks and ISS = BS still elevated in high 300s  - Lantus at 10 units at bedtime  - Humalog 3U was added yesterday  - Sliding scale changed to moderate coverage  - Steroids titrated to 40mg Q12H    > Anemia  - Multifactorial (chronic, now complicated with acute bleeding)  - s/p Venofer   - Continue Procrit  + stool for occult blood (will need GI work-up as outpt)      > Transaminitis- improved  - Likely secondary to liver congestion    > History of Depression  - Continue Duloxetine 60 mg    >B/L UE swelling  - Arm elevation  - Warm compresses   - No DVT     Encephalopathy due to resp acidosis- ABG noted. Improves w AVAPS but again refused overnight     DVT Prophylaxis -- heparin sc resumed, f/u H/H    Guarded prognosis  Will downgrade to 4T/telemetry  Nutritional supplement added 62 yo presenting to ED w/ SOB 2/2 diastolic HF exacerbation and noted to have KEITH and hyperkalemia, PMH CAD s/p CABG and PCI, PAD s/p baloon angioplasty, DM2, HTN, HLD, hx of cardiac arrest, recent GI bleed last month, Diastolic CHF, legally blind, pleural effusion requiring thoracentesis in Dec 2018, admitted for SOB, noted to have pleural effusion, started on bumex, s/p RRT noted on 3/1 for hypotension & lethargy. Pt became hypotensive after Bumex with albumin was given.  CXR with worsening plum edema b/l. BiPAP placed. ICU consult called for vasopressor support during IV diuresis. Midodrine ordered. She became increasingly lethargic with ABG showing resp acidosis with hypoxia. Pulmonary consult appreciated- started on IV steroids.     A/p    >Acute on Chronic Hypercapnic Respiratory Failure due to OHS/KYREE   Pulm recommend chronic NIV for chronic hypercarbic respiratory failure to improve symptoms and to decrease hospitalizations;- Pt refusing NIV at times Nocturnal and PRN as per RT. Eventual SINGH with NIV  - Pulmonary consult appreciated - taper steroid  - Avoid Narcotics for pain      >Chest pain with hx of CAD & CABG/PCI-  mild TnI elevation likely from demand ischemia & poor clearence. Disccussed with cardio. Will not follow further TnI. c/w Imdur, BB, statin, ASA. Plavix on hold since last admission due to GI Bleed.      > Lower GI Bleed likely diverticular  - s/p 1 unit of PRBC  - GI Consult appreciated  - s/p Colonoscopy   ~ Poor prep throughout the colon. Diverticulosis seen. Unable to visualize colon sufficiently for poor prep  No further GI w/u recommended, ASA resumed as per GI, Plavix yet on hold  Hg has remained stable, no further reported bleeding. Clear for d/c as per GI  Advanced diet     >Hyperkalemia  - Hold Losartan    > Acute on Chronic Diastolic Heart Failure with severe PHTN  & right ventricular failure from OHS/ KYREE- now with pulm edema & pleural effusions  - Was initially on Lasix Infusion which is now off  c/w coreg & imdur.  - Hold Losartan due to hyperkalemia & now KEITH  - ECHO shows EF of > 75%. Moderate to Severe TR, mod-severe PHTN  - Cardio follow up appreciated  - Demadex 40 mg qd.  - Replenish Albumin stores     >Pleural effusion  Chest tube on right draining. Maintain chest tube per CTS    > Acute on chronic kidney injury  ARF with prerenal on steroids; pulmonary Hypertension; Anemia; Chest  tube. Bladder retention. with UTI.  ct hold losartan   f/u BMP     > CAD stents in 2014 / PAD / HLD / HTN  - Continue, Imdur, Lipitor, Coreg and Norvasc   - Plavix on hold since last admission due to GI Bleed. ASA resumed as per GI, plavix yet on hold    > DM 2- elevated sugar due to steroids    - HbA1c 5.6  - Accu checks and ISS = BS still elevated in high 300s  - Lantus at 10 units at bedtime  - Humalog 3U was added yesterday  - Sliding scale changed to moderate coverage  - Steroids titrated to 40mg Q12H    > Anemia  - Multifactorial (chronic, now complicated with acute bleeding)  - s/p Venofer   - Continue Procrit  + stool for occult blood (will need GI work-up as outpt)  - Monitor CBC      > Transaminitis- improved  - Likely secondary to liver congestion    > History of Depression  - Continue Duloxetine 60 mg    >B/L UE swelling  - Arm elevation  - Warm compresses   - No DVT     Encephalopathy due to resp acidosis- ABG noted. Improves w AVAPS but again refused overnight     DVT Prophylaxis -- heparin sc resumed, f/u H/H    Guarded prognosis  Will downgrade to 4T/telemetry  Nutritional supplement added  Discussed with pt's sister at length, she is traveling back to [place].  Reminded and re-educated pt. on necessity of using BiPAP consistently

## 2019-03-05 NOTE — PROGRESS NOTE ADULT - PROBLEM SELECTOR PLAN 7
Improvement in mental and respiratory status  However, patient still with underlying pulmonary/cardiological issues for which she is at risk for decline.   Family was discussed consideration for home hospice services. They were encouraged by her improvements for which they may not be receptive to hospice services. PT recommending SINGH  Will cont to follow. Cont support

## 2019-03-05 NOTE — PROGRESS NOTE ADULT - ASSESSMENT
62 yo presenting to ED w/ SOB 2/2 diastolic HF exacerbation and noted to have KEITH and hyperkalemia, PMH CAD s/p CABG and PCI, PAD s/p baloon angioplasty, DM2, HTN, HLD, hx of cardiac arrest, recent GI bleed last month, Diastolic CHF, legally blind, pleural effusion requiring thoracentesis in Dec 2018.     Has CKD - creat around 1.62 - watch on diuretics ; Off ARBS; K also improved 4.1    >Chest pain with hx of CAD & CABG/PCI-  mild TnI elevation likely from demand ischemia & poor clearence. Disccussed with cardio. Will not follow further TnI. c/w Imdur, BB, statin, ASA. Plavix on hold since last admission due to GI Bleed.  > Lower GI Bleed likely diverticular  - s/p 1 unit of PRBC  - s/p Colonoscopy     Acute on Chronic Diastolic Heart Failure with severe PHTN  & right ventricular failure from OHS/ KYREE- now with pulm edema & pleural effusions  - Was initially on Lasix Infusion which is now off  c/w coreg & imdur.  - ECHO shows EF of > 75%. Moderate to Severe TR, mod-severe PHTN  Cardio follow up appreciated  CXR on 2/24 with worsening pulm vasc congestion- lasix 40mg IV q12 but was held for few days due to worsening kidney function & metabolic alkalosis from diuresis.     Cont to watch Creat CO2 Lytes on diuretics

## 2019-03-05 NOTE — PROGRESS NOTE ADULT - SUBJECTIVE AND OBJECTIVE BOX
Subjective: "I'm doing ok." Patient seen and examined at bedside. States she is still short of breath.    PAST MEDICAL & SURGICAL HISTORY:  Herniated disc, cervical  PAD (peripheral artery disease)  Legally blind  History of peripheral vascular disease  History of retinal detachment  History of CEA (carotid endarterectomy): Right  Hyperlipidemia  Glaucoma  Hypertension  Diabetes  S/P CABG x 1  After cataract  Uveitic glaucoma of both eyes  Detached retina  Atherosclerosis of right carotid artery   delivery delivered  Vital Signs:  Vital Signs Last 24 Hrs  T(C): 36.8 (19 @ 08:05), Max: 36.8 (19 @ 08:05)  T(F): 98.3 (19 @ 08:05), Max: 98.3 (19 @ 08:05)  HR: 59 (19 @ 08:45) (56 - 70)  BP: 122/82 (19 @ 08:00) (118/60 - 148/60)  RR: 4 (19 @ 08:00) (4 - 22)  SpO2: 97% (19 @ 08:45) (96% - 100%) on (O2)    Medications  acetaminophen   Tablet .. 650 milliGRAM(s) Oral every 6 hours PRN  albumin human 25% IVPB 50 milliLiter(s) IV Intermittent daily  ALBUTerol    0.083% 10 milliGRAM(s) Nebulizer once  ALBUTerol/ipratropium for Nebulization 3 milliLiter(s) Nebulizer every 6 hours  amLODIPine   Tablet 10 milliGRAM(s) Oral daily  aspirin enteric coated 81 milliGRAM(s) Oral daily  atorvastatin 80 milliGRAM(s) Oral at bedtime  carvedilol 25 milliGRAM(s) Oral every 12 hours  dextrose 40% Gel 15 Gram(s) Oral once PRN  dextrose 5%. 1000 milliLiter(s) IV Continuous <Continuous>  dextrose 50% Injectable 12.5 Gram(s) IV Push once  dextrose 50% Injectable 25 Gram(s) IV Push once  dextrose 50% Injectable 25 Gram(s) IV Push once  docusate sodium 100 milliGRAM(s) Oral two times a day  DULoxetine 60 milliGRAM(s) Oral daily  epoetin sara Injectable 06045 Unit(s) SubCutaneous <User Schedule>  ertapenem  IVPB 1000 milliGRAM(s) IV Intermittent every 24 hours  gabapentin 100 milliGRAM(s) Oral three times a day  glucagon  Injectable 1 milliGRAM(s) IntraMuscular once PRN  glycopyrrolate Injectable 0.1 milliGRAM(s) IV Push every 8 hours PRN  guaiFENesin    Syrup 100 milliGRAM(s) Oral every 6 hours PRN  heparin  Injectable 5000 Unit(s) SubCutaneous every 12 hours  insulin glargine Injectable (LANTUS) 10 Unit(s) SubCutaneous at bedtime  insulin lispro (HumaLOG) corrective regimen sliding scale   SubCutaneous three times a day before meals  insulin lispro Injectable (HumaLOG) 3 Unit(s) SubCutaneous three times a day before meals  isosorbide   mononitrate ER Tablet (IMDUR) 60 milliGRAM(s) Oral daily  magnesium oxide 400 milliGRAM(s) Oral daily  methylPREDNISolone sodium succinate Injectable 60 milliGRAM(s) IV Push every 8 hours  midodrine 2.5 milliGRAM(s) Oral three times a day PRN  ondansetron Injectable 4 milliGRAM(s) IV Push every 6 hours PRN  pantoprazole    Tablet 40 milliGRAM(s) Oral before breakfast  polyethylene glycol 3350 17 Gram(s) Oral daily  senna 2 Tablet(s) Oral at bedtime  torsemide 40 milliGRAM(s) Oral daily      Physical Exam  General: WN/WD NAD  Neurology: A&Ox3, nonfocal, GAN x 4  Respiratory: CTA B/L, diminished right basilar breath sounds.  CV: RRR, S1S2, no murmurs, rubs or gallops  Abdominal: Soft, NT, ND +BS,   Extremities: edema, + peripheral pulses  Tubes: right ct without airleak, dressing C/D/I.        @ 07:01  -  05 @ 07:00  --------------------------------------------------------  IN:    Oral Fluid: 720 mL    Solution: 50 mL    Solution: 50 mL    Solution: 50 mL  Total IN: 870 mL    OUT:    Chest Tube: 374 mL for 24hrs.     Indwelling Catheter - Urethral: 3025 mL  Total OUT: 3399 mL    Total NET: -2529 mL        Labs      142  |  92<L>  |  82.0<H>  ----------------------------<  373<H>  4.1   |  37.0<H>  |  1.72<H>    Ca    8.7      05 Mar 2019 04:42    TPro  6.6  /  Alb  2.8<L>  /  TBili  1.3  /  DBili  x   /  AST  13  /  ALT  16  /  AlkPhos  155<H>                            8.9    8.6   )-----------( 193      ( 05 Mar 2019 04:42 )             26.7       Today's CXR:   < from: Xray Chest 1 View- PORTABLE-Routine (19 @ 05:06) >  Comparison:  Chest x-ray 3/2/2019    Right chest tube againseen.    Prominence of the pulmonary vasculature, slightly increased in prominence.    The heart is enlarged.    Status post sternotomy.    IMPRESSION:     Right chest tube.    Pulmonary edema, slightly increased since 3/2/2019.        Assessment  63y Female admitted with complaints of SOB with persistent pleural effusion s/p IR pigtail placement on  with continued high output drainage with continued shortness of breath      PLAN  As per Dr. Escudero, plan is to leave chest tube in place to suction at this time due to continued high output state. Currently without airleak. Pending palliative care and goals of care meeting. CTS will continue to manage tube. CXR ordered for tomorrow morning.

## 2019-03-05 NOTE — PROGRESS NOTE ADULT - ASSESSMENT
62 y/o woman was admitted with SOB on 2/11 to [facility]. She has diastolic HF, KEITH, HTN, DM2. CAD s/p CABG, PAD and persistent pleural effusion s/p chest tube, with recent positive urine culture with Ecoli that is resistant to ceftriaxone.     UTI  Pleural effusion, based on pleural fluid analysis transudate    - Blood culture neg  - UA with high WBC  - UC with Ecoli   - Even though S to zosyn, will switch to ertapenem since it is R to other betalactam/betalactamse combinations.   - Ertapenem 1gm daily (crcl>30)  - last day would be 3/10.     Will sign off, call ID with any question.

## 2019-03-05 NOTE — PROGRESS NOTE ADULT - SUBJECTIVE AND OBJECTIVE BOX
[facility]  INFECTIOUS DISEASES AND INTERNAL MEDICINE at [place]  =======================================================  Matt De Jesus MD  Diplomates American Board of Internal Medicine and Infectious Diseases  =======================================================    MRN-[id number]  KUN ROCHA     Follow up: UTI     No complaint, doing fine, no abdominal pain, no fever. No SOB but has some cough.     PAST MEDICAL & SURGICAL HISTORY:  Herniated disc, cervical  PAD (peripheral artery disease)  Legally blind  History of peripheral vascular disease  History of retinal detachment  History of CEA (carotid endarterectomy): Right  Hyperlipidemia  Glaucoma  Hypertension  Diabetes  S/P CABG x 1  After cataract  Uveitic glaucoma of both eyes  Detached retina  Atherosclerosis of right carotid artery   delivery delivered    Allergies  Accupril (Other)    Antibiotics:  piperacillin/tazobactam IVPB. 3.375 Gram(s) IV Intermittent every 12 hours     REVIEW OF SYSTEMS:  CONSTITUTIONAL:  No Fever or chills  HEENT:  No diplopia or blurred vision.  No sore throat or runny nose.  CARDIOVASCULAR:  No chest pain or SOB.  RESPIRATORY:  No cough, shortness of breath, PND or orthopnea.  GASTROINTESTINAL:  No nausea, vomiting or diarrhea.  GENITOURINARY:  No dysuria, frequency or urgency. No Blood in urine  MUSCULOSKELETAL:  no joint aches, no muscle pain  SKIN:  No change in skin, hair or nails.  NEUROLOGIC:  No paresthesias, fasciculations, seizures or weakness.  PSYCHIATRIC:  No disorder of thought or mood.  ENDOCRINE:  No heat or cold intolerance, polyuria or polydipsia.  HEMATOLOGICAL:  No easy bruising or bleeding.     Physical Exam:  Vital Signs Last 24 Hrs  T(C): 36.8 (05 Mar 2019 08:05), Max: 36.8 (05 Mar 2019 08:05)  T(F): 98.3 (05 Mar 2019 08:05), Max: 98.3 (05 Mar 2019 08:05)  HR: 64 (05 Mar 2019 12:05) (56 - 70)  BP: 113/98 (05 Mar 2019 12:05) (113/98 - 148/60)  BP(mean): 76 (05 Mar 2019 04:00) (76 - 80)  RR: 16 (05 Mar 2019 12:05) (4 - 22)  SpO2: 97% (05 Mar 2019 12:05) (96% - 100%)  GEN: NAD on O2 with NC  HEENT: normocephalic and atraumatic. Right eye blind   NECK: Supple.  No lymphadenopathy   LUNGS: decreased BS bilaterally more in R side with scattered crackles   HEART: Regular rate and rhythm   ABDOMEN: Soft, nontender, and nondistended.  Positive bowel sounds.    : No CVA tenderness, fontenot +   EXTREMITIES: No edema.  NEUROLOGIC: grossly intact.  PSYCHIATRIC: Appropriate affect .  SKIN: No ulceration or induration present.    Labs:      142  |  92<L>  |  82.0<H>  ----------------------------<  373<H>  4.1   |  37.0<H>  |  1.72<H>    Ca    8.7      05 Mar 2019 04:42    TPro  6.6  /  Alb  2.8<L>  /  TBili  1.3  /  DBili  x   /  AST  13  /  ALT  16  /  AlkPhos  155<H>                          8.9    8.6   )-----------( 193      ( 05 Mar 2019 04:42 )             26.7     LIVER FUNCTIONS - ( 04 Mar 2019 07:29 )  Alb: 2.8 g/dL / Pro: 6.6 g/dL / ALK PHOS: 155 U/L / ALT: 16 U/L / AST: 13 U/L / GGT: x           RECENT CULTURES:   @ 10:41 .Urine Escherichia coli    >100,000 CFU/ml Escherichia coli     @ 09:30 .Blood     No growth at 48 hours     @ 23:47 .Body Fluid     Culture is being performed.     @ 12:36 .Body Fluid     No growth at 5 days.    No White blood cells  No organisms seen      All imaging and other data have been reviewed.

## 2019-03-05 NOTE — PROGRESS NOTE ADULT - SUBJECTIVE AND OBJECTIVE BOX
CC:  follow up GOC, symptoms  INTERVAL HPI/OVERNIGHT EVENTS:  no overnight events noted  PRESENT SYMPTOMS: SOURCE:  Patient/Family/Team    PAIN SCALE:  0 = none  1 = mild   2 = moderate  3 = severe    Pain: denies    Dyspnea:  [ ] YES [x ] NO  Anxiety:  [ ] YES [x ] NO  Fatigue: [x ] YES [ ] NO  Nausea: [ ] YES [x ] NO  Loss of Appetite: [x ] YES [ ] NO  Other symptoms: __________    MEDICATIONS  (STANDING):  albumin human 25% IVPB 50 milliLiter(s) IV Intermittent daily  ALBUTerol    0.083% 10 milliGRAM(s) Nebulizer once  ALBUTerol/ipratropium for Nebulization 3 milliLiter(s) Nebulizer every 6 hours  amLODIPine   Tablet 10 milliGRAM(s) Oral daily  aspirin enteric coated 81 milliGRAM(s) Oral daily  atorvastatin 80 milliGRAM(s) Oral at bedtime  carvedilol 25 milliGRAM(s) Oral every 12 hours  dextrose 5%. 1000 milliLiter(s) (50 mL/Hr) IV Continuous <Continuous>  dextrose 50% Injectable 12.5 Gram(s) IV Push once  dextrose 50% Injectable 25 Gram(s) IV Push once  dextrose 50% Injectable 25 Gram(s) IV Push once  docusate sodium 100 milliGRAM(s) Oral two times a day  DULoxetine 60 milliGRAM(s) Oral daily  epoetin sara Injectable 92980 Unit(s) SubCutaneous <User Schedule>  ertapenem  IVPB 1000 milliGRAM(s) IV Intermittent every 24 hours  gabapentin 100 milliGRAM(s) Oral three times a day  heparin  Injectable 5000 Unit(s) SubCutaneous every 12 hours  insulin glargine Injectable (LANTUS) 10 Unit(s) SubCutaneous at bedtime  insulin lispro (HumaLOG) corrective regimen sliding scale   SubCutaneous three times a day before meals  insulin lispro (HumaLOG) corrective regimen sliding scale   SubCutaneous at bedtime  insulin lispro Injectable (HumaLOG) 3 Unit(s) SubCutaneous three times a day before meals  isosorbide   mononitrate ER Tablet (IMDUR) 60 milliGRAM(s) Oral daily  magnesium oxide 400 milliGRAM(s) Oral daily  methylPREDNISolone sodium succinate Injectable 40 milliGRAM(s) IV Push two times a day  pantoprazole    Tablet 40 milliGRAM(s) Oral before breakfast  polyethylene glycol 3350 17 Gram(s) Oral daily  senna 2 Tablet(s) Oral at bedtime  torsemide 40 milliGRAM(s) Oral daily    MEDICATIONS  (PRN):  acetaminophen   Tablet .. 650 milliGRAM(s) Oral every 6 hours PRN Mild Pain (1 - 3)  dextrose 40% Gel 15 Gram(s) Oral once PRN Blood Glucose LESS THAN 70 milliGRAM(s)/deciliter  glucagon  Injectable 1 milliGRAM(s) IntraMuscular once PRN Glucose LESS THAN 70 milligrams/deciliter  glycopyrrolate Injectable 0.1 milliGRAM(s) IV Push every 8 hours PRN secreations  guaiFENesin    Syrup 100 milliGRAM(s) Oral every 6 hours PRN Cough  midodrine 2.5 milliGRAM(s) Oral three times a day PRN systolic < 110  ondansetron Injectable 4 milliGRAM(s) IV Push every 6 hours PRN Nausea and/or Vomiting      Allergies    Accupril (Other)    Intolerances    Karnofsky Performance Score/Palliative Performance Status Version 2: 30 %    Vital Signs Last 24 Hrs  T(C): 36.8 (05 Mar 2019 08:05), Max: 36.8 (05 Mar 2019 08:05)  T(F): 98.3 (05 Mar 2019 08:05), Max: 98.3 (05 Mar 2019 08:05)  HR: 59 (05 Mar 2019 08:45) (56 - 70)  BP: 122/82 (05 Mar 2019 08:00) (122/82 - 148/60)  BP(mean): 76 (05 Mar 2019 04:00) (76 - 80)  RR: 4 (05 Mar 2019 08:00) (4 - 22)  SpO2: 97% (05 Mar 2019 08:45) (96% - 100%)    PHYSICAL EXAM:    General: Alert NAD    HEENT: [x ] normal  [ ] dry mouth  [ ] ET tube/trach    Lungs: [x ] comfortable - comfortable    CV: [x ] normal  [ ] tachycardia    GI: [ x] normal  [ ] distended  [ ] tender  [ ] no BS               [ ] PEG/NG/OG tube    : [x ] normal  [ ] incontinent  [ ] oliguria/anuria  [ ] fontenot    MSK: [ ] normal  [x weakness  [ ] edema             [ ] ambulatory  [ x] bedbound/wheelchair bound    Skin: [ ] normal  [ ] pressure ulcers- Stage_____  [x ] no rash    LABS:                        8.9    8.6   )-----------( 193      ( 05 Mar 2019 04:42 )             26.7     03-05    142  |  92<L>  |  82.0<H>  ----------------------------<  373<H>  4.1   |  37.0<H>  |  1.72<H>    Ca    8.7      05 Mar 2019 04:42    TPro  6.6  /  Alb  2.8<L>  /  TBili  1.3  /  DBili  x   /  AST  13  /  ALT  16  /  AlkPhos  155<H>  03-04        I&O's Summary    04 Mar 2019 07:01  -  05 Mar 2019 07:00  --------------------------------------------------------  IN: 870 mL / OUT: 3399 mL / NET: -2529 mL        RADIOLOGY & ADDITIONAL STUDIES:    < from: Xray Chest 1 View- PORTABLE-Routine (03.04.19 @ 05:06) >   EXAM:  XR CHEST PORTABLE ROUTINE 1V                          PROCEDURE DATE:  03/04/2019          INTERPRETATION:  XR CHEST PORTABLE ROUTINE 1V    Single AP view    HISTORY:  Effusion.    Comparison:  Chest x-ray 3/2/2019    Right chest tube againseen.    Prominence of the pulmonary vasculature, slightly increased in prominence.    The heart is enlarged.    Status post sternotomy.    IMPRESSION:     Right chest tube.    Pulmonary edema, slightly increased since 3/2/2019.      < end of copied text >      Thank you for the opportunity to assist with the care of this patient.   [facility] [facility] [phone].

## 2019-03-06 LAB
ALBUMIN SERPL ELPH-MCNC: 2.7 G/DL — LOW (ref 3.3–5.2)
ALP SERPL-CCNC: 122 U/L — HIGH (ref 40–120)
ALT FLD-CCNC: 12 U/L — SIGNIFICANT CHANGE UP
ANION GAP SERPL CALC-SCNC: 10 MMOL/L — SIGNIFICANT CHANGE UP (ref 5–17)
AST SERPL-CCNC: 15 U/L — SIGNIFICANT CHANGE UP
BILIRUB SERPL-MCNC: 1.1 MG/DL — SIGNIFICANT CHANGE UP (ref 0.4–2)
BUN SERPL-MCNC: 75 MG/DL — HIGH (ref 8–20)
CALCIUM SERPL-MCNC: 8.9 MG/DL — SIGNIFICANT CHANGE UP (ref 8.6–10.2)
CHLORIDE SERPL-SCNC: 94 MMOL/L — LOW (ref 98–107)
CO2 SERPL-SCNC: 39 MMOL/L — HIGH (ref 22–29)
CREAT SERPL-MCNC: 1.46 MG/DL — HIGH (ref 0.5–1.3)
CULTURE RESULTS: SIGNIFICANT CHANGE UP
GLUCOSE BLDC GLUCOMTR-MCNC: 164 MG/DL — HIGH (ref 70–99)
GLUCOSE BLDC GLUCOMTR-MCNC: 227 MG/DL — HIGH (ref 70–99)
GLUCOSE BLDC GLUCOMTR-MCNC: 291 MG/DL — HIGH (ref 70–99)
GLUCOSE BLDC GLUCOMTR-MCNC: 329 MG/DL — HIGH (ref 70–99)
GLUCOSE SERPL-MCNC: 167 MG/DL — HIGH (ref 70–115)
HCT VFR BLD CALC: 24.2 % — LOW (ref 37–47)
HGB BLD-MCNC: 8.1 G/DL — LOW (ref 12–16)
MCHC RBC-ENTMCNC: 29.1 PG — SIGNIFICANT CHANGE UP (ref 27–31)
MCHC RBC-ENTMCNC: 33.5 G/DL — SIGNIFICANT CHANGE UP (ref 32–36)
MCV RBC AUTO: 87.1 FL — SIGNIFICANT CHANGE UP (ref 81–99)
PLATELET # BLD AUTO: 178 K/UL — SIGNIFICANT CHANGE UP (ref 150–400)
POTASSIUM SERPL-MCNC: 3.9 MMOL/L — SIGNIFICANT CHANGE UP (ref 3.5–5.3)
POTASSIUM SERPL-SCNC: 3.9 MMOL/L — SIGNIFICANT CHANGE UP (ref 3.5–5.3)
PROT SERPL-MCNC: 5.7 G/DL — LOW (ref 6.6–8.7)
RBC # BLD: 2.78 M/UL — LOW (ref 4.4–5.2)
RBC # FLD: 16.4 % — HIGH (ref 11–15.6)
SODIUM SERPL-SCNC: 143 MMOL/L — SIGNIFICANT CHANGE UP (ref 135–145)
SPECIMEN SOURCE: SIGNIFICANT CHANGE UP
WBC # BLD: 10.6 K/UL — SIGNIFICANT CHANGE UP (ref 4.8–10.8)
WBC # FLD AUTO: 10.6 K/UL — SIGNIFICANT CHANGE UP (ref 4.8–10.8)

## 2019-03-06 PROCEDURE — 99232 SBSQ HOSP IP/OBS MODERATE 35: CPT

## 2019-03-06 PROCEDURE — 99231 SBSQ HOSP IP/OBS SF/LOW 25: CPT

## 2019-03-06 PROCEDURE — 71045 X-RAY EXAM CHEST 1 VIEW: CPT | Mod: 26

## 2019-03-06 PROCEDURE — 99233 SBSQ HOSP IP/OBS HIGH 50: CPT

## 2019-03-06 RX ADMIN — HEPARIN SODIUM 5000 UNIT(S): 5000 INJECTION INTRAVENOUS; SUBCUTANEOUS at 05:54

## 2019-03-06 RX ADMIN — Medication 3 MILLILITER(S): at 09:41

## 2019-03-06 RX ADMIN — Medication 2: at 21:48

## 2019-03-06 RX ADMIN — Medication 81 MILLIGRAM(S): at 12:07

## 2019-03-06 RX ADMIN — ATORVASTATIN CALCIUM 80 MILLIGRAM(S): 80 TABLET, FILM COATED ORAL at 21:42

## 2019-03-06 RX ADMIN — Medication 3 UNIT(S): at 12:06

## 2019-03-06 RX ADMIN — HEPARIN SODIUM 5000 UNIT(S): 5000 INJECTION INTRAVENOUS; SUBCUTANEOUS at 17:52

## 2019-03-06 RX ADMIN — AMLODIPINE BESYLATE 10 MILLIGRAM(S): 2.5 TABLET ORAL at 05:54

## 2019-03-06 RX ADMIN — DULOXETINE HYDROCHLORIDE 60 MILLIGRAM(S): 30 CAPSULE, DELAYED RELEASE ORAL at 12:07

## 2019-03-06 RX ADMIN — Medication 40 MILLIGRAM(S): at 05:53

## 2019-03-06 RX ADMIN — Medication 650 MILLIGRAM(S): at 19:46

## 2019-03-06 RX ADMIN — Medication 8: at 17:51

## 2019-03-06 RX ADMIN — Medication 650 MILLIGRAM(S): at 20:40

## 2019-03-06 RX ADMIN — GABAPENTIN 100 MILLIGRAM(S): 400 CAPSULE ORAL at 21:42

## 2019-03-06 RX ADMIN — Medication 2: at 09:06

## 2019-03-06 RX ADMIN — PANTOPRAZOLE SODIUM 40 MILLIGRAM(S): 20 TABLET, DELAYED RELEASE ORAL at 05:54

## 2019-03-06 RX ADMIN — Medication 3 UNIT(S): at 09:06

## 2019-03-06 RX ADMIN — GABAPENTIN 100 MILLIGRAM(S): 400 CAPSULE ORAL at 05:54

## 2019-03-06 RX ADMIN — Medication 40 MILLIGRAM(S): at 17:50

## 2019-03-06 RX ADMIN — Medication 100 MILLIGRAM(S): at 05:54

## 2019-03-06 RX ADMIN — Medication 3 MILLILITER(S): at 03:04

## 2019-03-06 RX ADMIN — GABAPENTIN 100 MILLIGRAM(S): 400 CAPSULE ORAL at 12:07

## 2019-03-06 RX ADMIN — Medication 3 MILLILITER(S): at 16:01

## 2019-03-06 RX ADMIN — Medication 3 MILLILITER(S): at 21:14

## 2019-03-06 RX ADMIN — MAGNESIUM OXIDE 400 MG ORAL TABLET 400 MILLIGRAM(S): 241.3 TABLET ORAL at 12:05

## 2019-03-06 RX ADMIN — Medication 3 UNIT(S): at 17:51

## 2019-03-06 RX ADMIN — ERYTHROPOIETIN 20000 UNIT(S): 10000 INJECTION, SOLUTION INTRAVENOUS; SUBCUTANEOUS at 18:37

## 2019-03-06 RX ADMIN — Medication 50 MILLILITER(S): at 13:08

## 2019-03-06 RX ADMIN — ERTAPENEM SODIUM 120 MILLIGRAM(S): 1 INJECTION, POWDER, LYOPHILIZED, FOR SOLUTION INTRAMUSCULAR; INTRAVENOUS at 21:42

## 2019-03-06 RX ADMIN — CARVEDILOL PHOSPHATE 25 MILLIGRAM(S): 80 CAPSULE, EXTENDED RELEASE ORAL at 05:54

## 2019-03-06 RX ADMIN — ISOSORBIDE MONONITRATE 60 MILLIGRAM(S): 60 TABLET, EXTENDED RELEASE ORAL at 12:05

## 2019-03-06 RX ADMIN — INSULIN GLARGINE 10 UNIT(S): 100 INJECTION, SOLUTION SUBCUTANEOUS at 21:48

## 2019-03-06 RX ADMIN — CARVEDILOL PHOSPHATE 25 MILLIGRAM(S): 80 CAPSULE, EXTENDED RELEASE ORAL at 17:52

## 2019-03-06 RX ADMIN — Medication 4: at 12:06

## 2019-03-06 NOTE — PROGRESS NOTE ADULT - SUBJECTIVE AND OBJECTIVE BOX
PULMONARY PROGRESS NOTE      RANJIT ROCHA-[id number]    Patient is a 63y old  Female who presents with a chief complaint of SOB (06 Mar 2019 11:33)      INTERVAL HPI/OVERNIGHT EVENTS: Says she is feeling better. Chest tube still draining. On NCO2.     MEDICATIONS  (STANDING):  albumin human 25% IVPB 50 milliLiter(s) IV Intermittent daily  ALBUTerol    0.083% 10 milliGRAM(s) Nebulizer once  ALBUTerol/ipratropium for Nebulization 3 milliLiter(s) Nebulizer every 6 hours  amLODIPine   Tablet 10 milliGRAM(s) Oral daily  aspirin enteric coated 81 milliGRAM(s) Oral daily  atorvastatin 80 milliGRAM(s) Oral at bedtime  carvedilol 25 milliGRAM(s) Oral every 12 hours  dextrose 5%. 1000 milliLiter(s) (50 mL/Hr) IV Continuous <Continuous>  dextrose 50% Injectable 12.5 Gram(s) IV Push once  dextrose 50% Injectable 25 Gram(s) IV Push once  dextrose 50% Injectable 25 Gram(s) IV Push once  docusate sodium 100 milliGRAM(s) Oral two times a day  DULoxetine 60 milliGRAM(s) Oral daily  epoetin sara Injectable 44926 Unit(s) SubCutaneous <User Schedule>  ertapenem  IVPB 1000 milliGRAM(s) IV Intermittent every 24 hours  gabapentin 100 milliGRAM(s) Oral three times a day  heparin  Injectable 5000 Unit(s) SubCutaneous every 12 hours  insulin glargine Injectable (LANTUS) 10 Unit(s) SubCutaneous at bedtime  insulin lispro (HumaLOG) corrective regimen sliding scale   SubCutaneous three times a day before meals  insulin lispro (HumaLOG) corrective regimen sliding scale   SubCutaneous at bedtime  insulin lispro Injectable (HumaLOG) 3 Unit(s) SubCutaneous three times a day before meals  isosorbide   mononitrate ER Tablet (IMDUR) 60 milliGRAM(s) Oral daily  magnesium oxide 400 milliGRAM(s) Oral daily  methylPREDNISolone sodium succinate Injectable 40 milliGRAM(s) IV Push two times a day  pantoprazole    Tablet 40 milliGRAM(s) Oral before breakfast  polyethylene glycol 3350 17 Gram(s) Oral daily  senna 2 Tablet(s) Oral at bedtime  torsemide 40 milliGRAM(s) Oral daily      MEDICATIONS  (PRN):  acetaminophen   Tablet .. 650 milliGRAM(s) Oral every 6 hours PRN Mild Pain (1 - 3)  dextrose 40% Gel 15 Gram(s) Oral once PRN Blood Glucose LESS THAN 70 milliGRAM(s)/deciliter  glucagon  Injectable 1 milliGRAM(s) IntraMuscular once PRN Glucose LESS THAN 70 milligrams/deciliter  glycopyrrolate Injectable 0.1 milliGRAM(s) IV Push every 8 hours PRN secreations  guaiFENesin    Syrup 100 milliGRAM(s) Oral every 6 hours PRN Cough  midodrine 2.5 milliGRAM(s) Oral three times a day PRN systolic < 110  ondansetron Injectable 4 milliGRAM(s) IV Push every 6 hours PRN Nausea and/or Vomiting      Allergies    Accupril (Other)    Intolerances        PAST MEDICAL & SURGICAL HISTORY:  Herniated disc, cervical  PAD (peripheral artery disease)  Legally blind  History of peripheral vascular disease  History of retinal detachment  History of CEA (carotid endarterectomy): Right  Hyperlipidemia  Glaucoma  Hypertension  Diabetes  S/P CABG x 1  After cataract  Uveitic glaucoma of both eyes  Detached retina  Atherosclerosis of right carotid artery   delivery delivered             REVIEW OF SYSTEMS:    CONSTITUTIONAL:  No distress    HEENT:  Eyes:  No diplopia or blurred vision. ENT:  No earache, sore throat or runny nose.    CARDIOVASCULAR:  No pressure, squeezing, tightness, heaviness or aching about the chest; no palpitations.    RESPIRATORY:  per HPI     GASTROINTESTINAL:  No nausea, vomiting or diarrhea.    GENITOURINARY:  No dysuria, frequency or urgency.    NEUROLOGIC:  No paresthesias, fasciculations, seizures or weakness.    PSYCHIATRIC:  No disorder of thought or mood.    Vital Signs Last 24 Hrs  T(C): 36.7 (06 Mar 2019 09:57), Max: 36.7 (06 Mar 2019 09:57)  T(F): 98 (06 Mar 2019 09:57), Max: 98 (06 Mar 2019 09:57)  HR: 65 (06 Mar 2019 09:57) (58 - 73)  BP: 114/57 (06 Mar 2019 09:57) (110/48 - 132/60)  BP(mean): 78 (05 Mar 2019 20:00) (78 - 78)  RR: 16 (06 Mar 2019 09:57) (16 - 19)  SpO2: 93% (06 Mar 2019 09:43) (93% - 99%)    PHYSICAL EXAMINATION:    GENERAL: The patient is awake and alert in no apparent distress.     HEENT: Head is normocephalic and atraumatic.  Mucous membranes are moist.    NECK: Supple.    LUNGS: crackles, respirations unlabored    HEART: Regular rate and rhythm     ABDOMEN: Soft, nontender, and obese      EXTREMITIES: Without any cyanosis, clubbing, rash, lesions or edema.    NEUROLOGIC: Grossly intact.    LABS:                        8.1    10.6  )-----------( 178      ( 06 Mar 2019 06:14 )             24.2     03    143  |  94<L>  |  75.0<H>  ----------------------------<  167<H>  3.9   |  39.0<H>  |  1.46<H>    Ca    8.9      06 Mar 2019 06:14    TPro  5.7<L>  /  Alb  2.7<L>  /  TBili  1.1  /  DBili  x   /  AST  15  /  ALT  12  /  AlkPhos  122<H>           RADIOLOGY & ADDITIONAL STUDIES:  < from: Xray Chest 1 View- PORTABLE-Routine (19 @ 06:53) >   EXAM:  XR CHEST PORTABLE ROUTINE 1V                          PROCEDURE DATE:  2019          INTERPRETATION:  CHEST AP PORTABLE:    History: Shortness of Breath.     Date and time of exam: 3/6/2019 6:36 AM.    Technique: A single AP view of the chest was obtained.    Comparison exam: 3/4/2019 4:22 AM.    Findings:  Cardiomegaly. Pulmonary vascular congestion. Small bilateral pleural   effusions. Poststernotomy changes. No evidence of pneumothorax. A right   pigtail chest tube is again noted, unchanged. Decreasing atelectasis at   the left lung base since the prior study..    Impression:  Findings consistent with congestive heart failure. Decreasing atelectasis   at the left lung base since the prior study..                TIA JOHNSTON M.D., ATTENDING RADIOLOGIST    < end of copied text >

## 2019-03-06 NOTE — PROGRESS NOTE ADULT - SUBJECTIVE AND OBJECTIVE BOX
Patient seen and examined.  Denies CP, SOB, N/V.  Reports feeling much better today.    T(C): 36.7 (03-06-19 @ 09:57)  T(F): 98 (03-06-19 @ 09:57)  HR: 65 (03-06-19 @ 09:57)  BP: 114/57 (03-06-19 @ 09:57)  BP(mean): 78 (03-05-19 @ 20:00)  ABP: --  ABP(mean): --  RR: 16 (03-06-19 @ 09:57)  SpO2: 93% (03-06-19 @ 09:43)  Wt(kg): --  CVP(mm Hg): --  CI: --  PA: --  PA(mean): --  PA(direct): --  SVRI: --      Physical Exam:  Gen: A&Ox3  Pulm:  CTA b/l, no r/r/w  CV:  S1S2, RRR, no m/r/g  Abd: +BS, soft, NT, ND obese  Ext:  +DP b/l, no c/c/e   CT to suction no airleak serosanguinous drainage    I&O's Detail    05 Mar 2019 07:01  -  06 Mar 2019 07:00  --------------------------------------------------------  IN:    Oral Fluid: 1275 mL    Solution: 50 mL    Solution: 50 mL  Total IN: 1375 mL    OUT:    Chest Tube: 660 mL    Indwelling Catheter - Urethral: 2525 mL  Total OUT: 3185 mL    Total NET: -1810 mL      06 Mar 2019 07:01  -  06 Mar 2019 11:56  --------------------------------------------------------  IN:    Oral Fluid: 240 mL  Total IN: 240 mL    OUT:  Total OUT: 0 mL    Total NET: 240 mL                              8.1    10.6  )-----------( 178      ( 06 Mar 2019 06:14 )             24.2   03-06    143  |  94<L>  |  75.0<H>  ----------------------------<  167<H>  3.9   |  39.0<H>  |  1.46<H>    Ca    8.9      06 Mar 2019 06:14    TPro  5.7<L>  /  Alb  2.7<L>  /  TBili  1.1  /  DBili  x   /  AST  15  /  ALT  12  /  AlkPhos  122<H>  03-06      CAPILLARY BLOOD GLUCOSE      POCT Blood Glucose.: 164 mg/dL (06 Mar 2019 08:08)        Medications:  acetaminophen   Tablet .. 650 milliGRAM(s) Oral every 6 hours PRN  albumin human 25% IVPB 50 milliLiter(s) IV Intermittent daily  ALBUTerol    0.083% 10 milliGRAM(s) Nebulizer once  ALBUTerol/ipratropium for Nebulization 3 milliLiter(s) Nebulizer every 6 hours  amLODIPine   Tablet 10 milliGRAM(s) Oral daily  aspirin enteric coated 81 milliGRAM(s) Oral daily  atorvastatin 80 milliGRAM(s) Oral at bedtime  carvedilol 25 milliGRAM(s) Oral every 12 hours  dextrose 40% Gel 15 Gram(s) Oral once PRN  dextrose 5%. 1000 milliLiter(s) IV Continuous <Continuous>  dextrose 50% Injectable 12.5 Gram(s) IV Push once  dextrose 50% Injectable 25 Gram(s) IV Push once  dextrose 50% Injectable 25 Gram(s) IV Push once  docusate sodium 100 milliGRAM(s) Oral two times a day  DULoxetine 60 milliGRAM(s) Oral daily  epoetin sara Injectable 25135 Unit(s) SubCutaneous <User Schedule>  ertapenem  IVPB 1000 milliGRAM(s) IV Intermittent every 24 hours  gabapentin 100 milliGRAM(s) Oral three times a day  glucagon  Injectable 1 milliGRAM(s) IntraMuscular once PRN  glycopyrrolate Injectable 0.1 milliGRAM(s) IV Push every 8 hours PRN  guaiFENesin    Syrup 100 milliGRAM(s) Oral every 6 hours PRN  heparin  Injectable 5000 Unit(s) SubCutaneous every 12 hours  insulin glargine Injectable (LANTUS) 10 Unit(s) SubCutaneous at bedtime  insulin lispro (HumaLOG) corrective regimen sliding scale   SubCutaneous three times a day before meals  insulin lispro (HumaLOG) corrective regimen sliding scale   SubCutaneous at bedtime  insulin lispro Injectable (HumaLOG) 3 Unit(s) SubCutaneous three times a day before meals  isosorbide   mononitrate ER Tablet (IMDUR) 60 milliGRAM(s) Oral daily  magnesium oxide 400 milliGRAM(s) Oral daily  methylPREDNISolone sodium succinate Injectable 40 milliGRAM(s) IV Push two times a day  midodrine 2.5 milliGRAM(s) Oral three times a day PRN  ondansetron Injectable 4 milliGRAM(s) IV Push every 6 hours PRN  pantoprazole    Tablet 40 milliGRAM(s) Oral before breakfast  polyethylene glycol 3350 17 Gram(s) Oral daily  senna 2 Tablet(s) Oral at bedtime  torsemide 40 milliGRAM(s) Oral daily      CXR: no ptx          Patent currently stable, NAD.    Plan:

## 2019-03-06 NOTE — PROGRESS NOTE ADULT - ASSESSMENT
ARF: decompensated CHF (R > L)  Elevated BUN also due to steroids  1.1g proteinuria  - cont Torsemide ==> will need to tolerate azotemia  - monitor labs    Anemia: +CRF +GIB  s/p IV Fe==> Tsat=27%  - cont QUENTIN  - monitor H/H  -  target Hgb=10.0  - PRBCs as needed

## 2019-03-06 NOTE — PROGRESS NOTE ADULT - ASSESSMENT
Assessment:  Pt is a 63y year old Female  s/p Chest tube placement with US guidance  Colonoscopy  Peripheral insertion of midline catheter  Vascular access with ultrasound guidance    Pt stable, NAD

## 2019-03-06 NOTE — PROGRESS NOTE ADULT - ASSESSMENT
-MO with OHS   -Pulm edema   -Pleural effusion; s/p pigtail; low albumin, protein. Still draining.   -Diastolic CHF  -Pulm HTN  -Renal failure  -GI bleed with anemia  -Hypercarbic resp failure; latest ABG compensated pH; see above for etiologies  -Chronic hypoxic resp failure; see above    Pt DNR    RECC:  Continue bpap QHS and prn.. Chest tube to drainage. Diurese as BP allows. Renal f/u. Cardiology f/u.      Prognosis poor.

## 2019-03-06 NOTE — PROGRESS NOTE ADULT - ASSESSMENT
64 yo presenting to ED w/ SOB 2/2 diastolic HF exacerbation and noted to have KEITH and hyperkalemia, PMH CAD s/p CABG and PCI, PAD s/p baloon angioplasty, DM2, HTN, HLD, hx of cardiac arrest, recent GI bleed last month, Diastolic CHF, legally blind, pleural effusion requiring thoracentesis in Dec 2018, admitted for SOB, noted to have pleural effusion, started on bumex, s/p RRT noted on 3/1 for hypotension & lethargy. Pt became hypotensive after Bumex with albumin was given.  CXR with worsening plum edema b/l. BiPAP placed. ICU consult called for vasopressor support during IV diuresis. Midodrine ordered. She became increasingly lethargic with ABG showing resp acidosis with hypoxia. Pulmonary consult appreciated- started on IV steroids.     A/p    >Acute on Chronic Hypercapnic Respiratory Failure due to OHS/KYREE   Pulm recommend chronic NIV for chronic hypercarbic respiratory failure to improve symptoms and to decrease hospitalizations  non compliant with Bipap- counseled - used bipap last ngiht  Eventual SINGH with NIV  taper steroid per pulmonary  - Avoid Narcotics for pain    >UTI - appreciate ID input - c/w IN invanz - duration per ID    >Chest pain with hx of CAD & CABG/PCI-  mild TnI elevation likely from demand ischemia & poor clearence. Disccussed with cardio. Will not follow further TnI. c/w Imdur, BB, statin, ASA. Plavix on hold since last admission due to GI Bleed.      > Lower GI Bleed likely diverticular  - s/p 1 unit of PRBC  - GI Consult appreciated  - s/p Colonoscopy   ~ Poor prep throughout the colon. Diverticulosis seen. Unable to visualize colon sufficiently for poor prep  No further GI w/u recommended, ASA resumed as per GI, Plavix yet on hold  Hg has remained stable, no further reported bleeding. Clear for d/c as per GI  Advanced diet     >Hyperkalemia  - Hold Losartan    > Acute on Chronic Diastolic Heart Failure with severe PHTN  & right ventricular failure from OHS/ KYREE- now with pulm edema & pleural effusions  - Was initially on Lasix Infusion which is now off  c/w coreg & imdur.  - Hold Losartan due to hyperkalemia & now KEITH  - ECHO shows EF of > 75%. Moderate to Severe TR, mod-severe PHTN  - Cardio follow up appreciated  - Demadex 40 mg qd.  - Replenish Albumin stores     >Pleural effusion  Chest tube on right draining. Maintain chest tube per CTS    > Acute on chronic kidney injury - improving  appreciate renal input - - cont Torsemide ==> will need to tolerate azotemia  ct hold losartan   f/u BMP     > CAD stents in 2014 / PAD / HLD / HTN  - Continue, Imdur, Lipitor, Coreg and Norvasc   - Plavix on hold since last admission due to GI Bleed. ASA resumed as per GI, plavix yet on hold    > DM 2  - HbA1c 5.6  - FS improving   - Lantus at 10 units at bedtime plus Humalog 3U   - Sliding scale changed to moderate coverage  - Steroids taper    > Anemia  - Multifactorial (chronic, now complicated with acute bleeding)  - s/p Venofer   - Continue Procrit  + stool for occult blood (will need GI work-up as outpt)  - Monitor CBC      > Transaminitis- improved  - Likely secondary to liver congestion    > History of Depression  - Continue Duloxetine 60 mg    >B/L UE swelling  - Arm elevation  - Warm compresses   - No DVT     Encephalopathy due to resp acidosis- ABG noted. Improves w AVAPS but again refused overnight     DVT Prophylaxis -- heparin sc resumed, f/u H/H

## 2019-03-06 NOTE — PROGRESS NOTE ADULT - SUBJECTIVE AND OBJECTIVE BOX
NEPHROLOGY INTERVAL HPI/OVERNIGHT EVENTS:  Pt essentially the same and comfortable  no acute distress noted    MEDICATIONS  (STANDING):  albumin human 25% IVPB 50 milliLiter(s) IV Intermittent daily  ALBUTerol    0.083% 10 milliGRAM(s) Nebulizer once  ALBUTerol/ipratropium for Nebulization 3 milliLiter(s) Nebulizer every 6 hours  amLODIPine   Tablet 10 milliGRAM(s) Oral daily  aspirin enteric coated 81 milliGRAM(s) Oral daily  atorvastatin 80 milliGRAM(s) Oral at bedtime  carvedilol 25 milliGRAM(s) Oral every 12 hours  dextrose 5%. 1000 milliLiter(s) (50 mL/Hr) IV Continuous <Continuous>  dextrose 50% Injectable 12.5 Gram(s) IV Push once  dextrose 50% Injectable 25 Gram(s) IV Push once  dextrose 50% Injectable 25 Gram(s) IV Push once  docusate sodium 100 milliGRAM(s) Oral two times a day  DULoxetine 60 milliGRAM(s) Oral daily  epoetin sara Injectable 86140 Unit(s) SubCutaneous <User Schedule>  ertapenem  IVPB 1000 milliGRAM(s) IV Intermittent every 24 hours  gabapentin 100 milliGRAM(s) Oral three times a day  heparin  Injectable 5000 Unit(s) SubCutaneous every 12 hours  insulin glargine Injectable (LANTUS) 10 Unit(s) SubCutaneous at bedtime  insulin lispro (HumaLOG) corrective regimen sliding scale   SubCutaneous three times a day before meals  insulin lispro (HumaLOG) corrective regimen sliding scale   SubCutaneous at bedtime  insulin lispro Injectable (HumaLOG) 3 Unit(s) SubCutaneous three times a day before meals  isosorbide   mononitrate ER Tablet (IMDUR) 60 milliGRAM(s) Oral daily  magnesium oxide 400 milliGRAM(s) Oral daily  methylPREDNISolone sodium succinate Injectable 40 milliGRAM(s) IV Push two times a day  pantoprazole    Tablet 40 milliGRAM(s) Oral before breakfast  polyethylene glycol 3350 17 Gram(s) Oral daily  senna 2 Tablet(s) Oral at bedtime  torsemide 40 milliGRAM(s) Oral daily    MEDICATIONS  (PRN):  acetaminophen   Tablet .. 650 milliGRAM(s) Oral every 6 hours PRN Mild Pain (1 - 3)  dextrose 40% Gel 15 Gram(s) Oral once PRN Blood Glucose LESS THAN 70 milliGRAM(s)/deciliter  glucagon  Injectable 1 milliGRAM(s) IntraMuscular once PRN Glucose LESS THAN 70 milligrams/deciliter  glycopyrrolate Injectable 0.1 milliGRAM(s) IV Push every 8 hours PRN secreations  guaiFENesin    Syrup 100 milliGRAM(s) Oral every 6 hours PRN Cough  midodrine 2.5 milliGRAM(s) Oral three times a day PRN systolic < 110  ondansetron Injectable 4 milliGRAM(s) IV Push every 6 hours PRN Nausea and/or Vomiting      Allergies    Accupril (Other)        Vital Signs Last 24 Hrs  T(C): 36.7 (06 Mar 2019 09:57), Max: 36.7 (06 Mar 2019 09:57)  T(F): 98 (06 Mar 2019 09:57), Max: 98 (06 Mar 2019 09:57)  HR: 65 (06 Mar 2019 09:57) (58 - 73)  BP: 114/57 (06 Mar 2019 09:57) (110/48 - 132/60)  BP(mean): 78 (05 Mar 2019 20:00) (78 - 78)  RR: 16 (06 Mar 2019 09:57) (16 - 19)  SpO2: 93% (06 Mar 2019 09:43) (93% - 99%)    PHYSICAL EXAM:  Comfortable  Cardiovascular: Normal S1 S2, No rub  Respiratory: Clear lungs with decreased BS at bases	  Neuro: A & O x 3; non focal  Gastrointestinal:  Soft, Non-tender, + BS	  Extremities: + LE edema    LABS:                        8.1    10.6  )-----------( 178      ( 06 Mar 2019 06:14 )             24.2     % Saturation, Iron: 27 % (03.05.19 @ 04:42)        03-06    143  |  94<L>  |  75.0<H>  ----------------------------<  167<H>  3.9   |  39.0<H>  |  1.46<H>    Ca    8.9      06 Mar 2019 06:14    TPro  5.7<L>  /  Alb  2.7<L>  /  TBili  1.1  /  DBili  x   /  AST  15  /  ALT  12  /  AlkPhos  122<H>  03-06            RADIOLOGY & ADDITIONAL TESTS:  < from: Xray Chest 1 View- PORTABLE-Routine (03.06.19 @ 06:53) >   EXAM:  XR CHEST PORTABLE ROUTINE 1V                          PROCEDURE DATE:  03/06/2019          INTERPRETATION:  CHEST AP PORTABLE:    History: Shortness of Breath.     Date and time of exam: 3/6/2019 6:36 AM.    Technique: A single AP view of the chest was obtained.    Comparison exam: 3/4/2019 4:22 AM.    Findings:  Cardiomegaly. Pulmonary vascular congestion. Small bilateral pleural   effusions. Poststernotomy changes. No evidence of pneumothorax. A right   pigtail chest tube is again noted, unchanged. Decreasing atelectasis at   the left lung base since the prior study..    Impression:  Findings consistent with congestive heart failure. Decreasing atelectasis   at the left lung base since the prior study..    < end of copied text >

## 2019-03-06 NOTE — PROGRESS NOTE ADULT - SUBJECTIVE AND OBJECTIVE BOX
Internal Medicine Hospitalist - Dr. Lobo ROCHA    [id number]    63y      Female    Patient is a 63y old  Female who presents with a chief complaint of SOB (06 Mar 2019 09:57)    INTERVAL HPI/ OVERNIGHT EVENTS: Patient pat is seen and examined,  feeling better, afebrile, denied chest pain, SOB, abd. pain, nausea and vomiting    REVIEW OF SYSTEMS:    Denied fever, chills, abd. pain, nausea, vomiting, chest pain, headache, dizziness    PHYSICAL EXAM:    Vital Signs Last 24 Hrs  T(C): 36.7 (06 Mar 2019 09:57), Max: 36.7 (06 Mar 2019 09:57)  T(F): 98 (06 Mar 2019 09:57), Max: 98 (06 Mar 2019 09:57)  HR: 65 (06 Mar 2019 09:57) (58 - 73)  BP: 114/57 (06 Mar 2019 09:57) (110/48 - 132/60)  BP(mean): 78 (05 Mar 2019 20:00) (78 - 78)  RR: 16 (06 Mar 2019 09:57) (16 - 19)  SpO2: 93% (06 Mar 2019 09:43) (93% - 99%)    GENERAL: NAD  CHEST/LUNG: CTA b/l   HEART: S1S2+ audible  ABDOMEN: Soft, Nontender, obese; Bowel sounds present  EXTREMITIES:  no edema  CNS: AAO X 3  Psychiatry: normal mood    LABS:                        8.1    10.6  )-----------( 178      ( 06 Mar 2019 06:14 )             24.2     03-06    143  |  94<L>  |  75.0<H>  ----------------------------<  167<H>  3.9   |  39.0<H>  |  1.46<H>    Ca    8.9      06 Mar 2019 06:14    TPro  5.7<L>  /  Alb  2.7<L>  /  TBili  1.1  /  DBili  x   /  AST  15  /  ALT  12  /  AlkPhos  122<H>  03-06      MEDICATIONS  (STANDING):  albumin human 25% IVPB 50 milliLiter(s) IV Intermittent daily  ALBUTerol    0.083% 10 milliGRAM(s) Nebulizer once  ALBUTerol/ipratropium for Nebulization 3 milliLiter(s) Nebulizer every 6 hours  amLODIPine   Tablet 10 milliGRAM(s) Oral daily  aspirin enteric coated 81 milliGRAM(s) Oral daily  atorvastatin 80 milliGRAM(s) Oral at bedtime  carvedilol 25 milliGRAM(s) Oral every 12 hours  dextrose 5%. 1000 milliLiter(s) (50 mL/Hr) IV Continuous <Continuous>  dextrose 50% Injectable 12.5 Gram(s) IV Push once  dextrose 50% Injectable 25 Gram(s) IV Push once  dextrose 50% Injectable 25 Gram(s) IV Push once  docusate sodium 100 milliGRAM(s) Oral two times a day  DULoxetine 60 milliGRAM(s) Oral daily  epoetin sara Injectable 10959 Unit(s) SubCutaneous <User Schedule>  ertapenem  IVPB 1000 milliGRAM(s) IV Intermittent every 24 hours  gabapentin 100 milliGRAM(s) Oral three times a day  heparin  Injectable 5000 Unit(s) SubCutaneous every 12 hours  insulin glargine Injectable (LANTUS) 10 Unit(s) SubCutaneous at bedtime  insulin lispro (HumaLOG) corrective regimen sliding scale   SubCutaneous three times a day before meals  insulin lispro (HumaLOG) corrective regimen sliding scale   SubCutaneous at bedtime  insulin lispro Injectable (HumaLOG) 3 Unit(s) SubCutaneous three times a day before meals  isosorbide   mononitrate ER Tablet (IMDUR) 60 milliGRAM(s) Oral daily  magnesium oxide 400 milliGRAM(s) Oral daily  methylPREDNISolone sodium succinate Injectable 40 milliGRAM(s) IV Push two times a day  pantoprazole    Tablet 40 milliGRAM(s) Oral before breakfast  polyethylene glycol 3350 17 Gram(s) Oral daily  senna 2 Tablet(s) Oral at bedtime  torsemide 40 milliGRAM(s) Oral daily    MEDICATIONS  (PRN):  acetaminophen   Tablet .. 650 milliGRAM(s) Oral every 6 hours PRN Mild Pain (1 - 3)  dextrose 40% Gel 15 Gram(s) Oral once PRN Blood Glucose LESS THAN 70 milliGRAM(s)/deciliter  glucagon  Injectable 1 milliGRAM(s) IntraMuscular once PRN Glucose LESS THAN 70 milligrams/deciliter  glycopyrrolate Injectable 0.1 milliGRAM(s) IV Push every 8 hours PRN secreations  guaiFENesin    Syrup 100 milliGRAM(s) Oral every 6 hours PRN Cough  midodrine 2.5 milliGRAM(s) Oral three times a day PRN systolic < 110  ondansetron Injectable 4 milliGRAM(s) IV Push every 6 hours PRN Nausea and/or Vomiting      RADIOLOGY & ADDITIONAL TEST

## 2019-03-07 LAB
ANION GAP SERPL CALC-SCNC: 9 MMOL/L — SIGNIFICANT CHANGE UP (ref 5–17)
BLD GP AB SCN SERPL QL: SIGNIFICANT CHANGE UP
BUN SERPL-MCNC: 80 MG/DL — HIGH (ref 8–20)
CALCIUM SERPL-MCNC: 8.8 MG/DL — SIGNIFICANT CHANGE UP (ref 8.6–10.2)
CHLORIDE SERPL-SCNC: 95 MMOL/L — LOW (ref 98–107)
CO2 SERPL-SCNC: 40 MMOL/L — HIGH (ref 22–29)
CREAT SERPL-MCNC: 1.62 MG/DL — HIGH (ref 0.5–1.3)
GLUCOSE BLDC GLUCOMTR-MCNC: 253 MG/DL — HIGH (ref 70–99)
GLUCOSE BLDC GLUCOMTR-MCNC: 302 MG/DL — HIGH (ref 70–99)
GLUCOSE BLDC GLUCOMTR-MCNC: 363 MG/DL — HIGH (ref 70–99)
GLUCOSE BLDC GLUCOMTR-MCNC: 378 MG/DL — HIGH (ref 70–99)
GLUCOSE SERPL-MCNC: 240 MG/DL — HIGH (ref 70–115)
HCT VFR BLD CALC: 21.5 % — LOW (ref 37–47)
HGB BLD-MCNC: 7.4 G/DL — LOW (ref 12–16)
MCHC RBC-ENTMCNC: 29.7 PG — SIGNIFICANT CHANGE UP (ref 27–31)
MCHC RBC-ENTMCNC: 34.4 G/DL — SIGNIFICANT CHANGE UP (ref 32–36)
MCV RBC AUTO: 86.3 FL — SIGNIFICANT CHANGE UP (ref 81–99)
PLATELET # BLD AUTO: 171 K/UL — SIGNIFICANT CHANGE UP (ref 150–400)
POTASSIUM SERPL-MCNC: 4.5 MMOL/L — SIGNIFICANT CHANGE UP (ref 3.5–5.3)
POTASSIUM SERPL-SCNC: 4.5 MMOL/L — SIGNIFICANT CHANGE UP (ref 3.5–5.3)
RBC # BLD: 2.49 M/UL — LOW (ref 4.4–5.2)
RBC # FLD: 16.3 % — HIGH (ref 11–15.6)
SODIUM SERPL-SCNC: 144 MMOL/L — SIGNIFICANT CHANGE UP (ref 135–145)
TYPE + AB SCN PNL BLD: SIGNIFICANT CHANGE UP
WBC # BLD: 9.2 K/UL — SIGNIFICANT CHANGE UP (ref 4.8–10.8)
WBC # FLD AUTO: 9.2 K/UL — SIGNIFICANT CHANGE UP (ref 4.8–10.8)

## 2019-03-07 PROCEDURE — 99233 SBSQ HOSP IP/OBS HIGH 50: CPT

## 2019-03-07 PROCEDURE — 71045 X-RAY EXAM CHEST 1 VIEW: CPT | Mod: 26

## 2019-03-07 PROCEDURE — 99232 SBSQ HOSP IP/OBS MODERATE 35: CPT

## 2019-03-07 RX ADMIN — DULOXETINE HYDROCHLORIDE 60 MILLIGRAM(S): 30 CAPSULE, DELAYED RELEASE ORAL at 13:01

## 2019-03-07 RX ADMIN — Medication 3 UNIT(S): at 12:13

## 2019-03-07 RX ADMIN — Medication 3 MILLILITER(S): at 03:28

## 2019-03-07 RX ADMIN — GABAPENTIN 100 MILLIGRAM(S): 400 CAPSULE ORAL at 05:43

## 2019-03-07 RX ADMIN — Medication 8: at 12:13

## 2019-03-07 RX ADMIN — Medication 40 MILLIGRAM(S): at 05:43

## 2019-03-07 RX ADMIN — Medication 10: at 17:47

## 2019-03-07 RX ADMIN — MAGNESIUM OXIDE 400 MG ORAL TABLET 400 MILLIGRAM(S): 241.3 TABLET ORAL at 13:01

## 2019-03-07 RX ADMIN — HEPARIN SODIUM 5000 UNIT(S): 5000 INJECTION INTRAVENOUS; SUBCUTANEOUS at 17:47

## 2019-03-07 RX ADMIN — Medication 3 MILLILITER(S): at 10:45

## 2019-03-07 RX ADMIN — ISOSORBIDE MONONITRATE 60 MILLIGRAM(S): 60 TABLET, EXTENDED RELEASE ORAL at 13:01

## 2019-03-07 RX ADMIN — ATORVASTATIN CALCIUM 80 MILLIGRAM(S): 80 TABLET, FILM COATED ORAL at 21:22

## 2019-03-07 RX ADMIN — Medication 3 UNIT(S): at 08:46

## 2019-03-07 RX ADMIN — GABAPENTIN 100 MILLIGRAM(S): 400 CAPSULE ORAL at 21:21

## 2019-03-07 RX ADMIN — CARVEDILOL PHOSPHATE 25 MILLIGRAM(S): 80 CAPSULE, EXTENDED RELEASE ORAL at 05:43

## 2019-03-07 RX ADMIN — Medication 100 MILLIGRAM(S): at 17:46

## 2019-03-07 RX ADMIN — SENNA PLUS 2 TABLET(S): 8.6 TABLET ORAL at 21:21

## 2019-03-07 RX ADMIN — CARVEDILOL PHOSPHATE 25 MILLIGRAM(S): 80 CAPSULE, EXTENDED RELEASE ORAL at 17:46

## 2019-03-07 RX ADMIN — INSULIN GLARGINE 10 UNIT(S): 100 INJECTION, SOLUTION SUBCUTANEOUS at 21:21

## 2019-03-07 RX ADMIN — AMLODIPINE BESYLATE 10 MILLIGRAM(S): 2.5 TABLET ORAL at 05:43

## 2019-03-07 RX ADMIN — Medication 3 MILLILITER(S): at 14:55

## 2019-03-07 RX ADMIN — Medication 3 MILLILITER(S): at 21:13

## 2019-03-07 RX ADMIN — PANTOPRAZOLE SODIUM 40 MILLIGRAM(S): 20 TABLET, DELAYED RELEASE ORAL at 05:43

## 2019-03-07 RX ADMIN — GABAPENTIN 100 MILLIGRAM(S): 400 CAPSULE ORAL at 13:01

## 2019-03-07 RX ADMIN — Medication 6: at 08:46

## 2019-03-07 RX ADMIN — Medication 3 UNIT(S): at 17:47

## 2019-03-07 RX ADMIN — HEPARIN SODIUM 5000 UNIT(S): 5000 INJECTION INTRAVENOUS; SUBCUTANEOUS at 05:43

## 2019-03-07 RX ADMIN — Medication 6: at 21:22

## 2019-03-07 RX ADMIN — Medication 81 MILLIGRAM(S): at 13:01

## 2019-03-07 NOTE — PROGRESS NOTE ADULT - SUBJECTIVE AND OBJECTIVE BOX
KUN ROCHA Patient is a 63y old  Female who presents with a chief complaint of SOB (07 Mar 2019 11:33)     HPI:  Pt is a 62 yo presenting to ED w/ SOB, PMH CAD s/p CABG and PCI, PAD s/p baloon angioplasty, DM2, HTN, HLD, hx of cardiac arrest, recent GI bleed last month, Diastolic CHF, legally blind, pleural effusion requiring thoracentesis in Dec 2018.   Patient states that she has been having increasing SOB over the last week, she lives at home with her family, denies sick contacts, but has been having more exertional dyspnea. She states she walks with a walker but it is taking less activity for her dyspnea to occur. She has been compliant with all of her medications, she does not recall the names, but states that there have been no changes to her medication regimen since her discharge. She denies any associated chest pain or pressure. She has been taking her tosemide she believes. Denies PND or orthopnea, but she has noted increased LE edema.   Denies headaches, nausea, vomiting, chest pain, palpitations, abdominal pain, constipation, diarrhea, melena, hematochezia, dysuria.   Her SOB has since resolved. She is breathing comfortably, has no complaints.  In ED patient was noted to have hyperkalemia on repeat labs, troponin elevated at 0.09. Her creatinine is also elevated. She did not receive any potassium treatment initially so I ordered Calcium gluc, kayexelate, Insulin, dextrose, Albuterol. EKG without widened QRS or peaked T waves. (2019 03:58)    The patient was seen and evaluated known CAD - anemia- pRBC ordered   The patient is in no acute distress.  Denied any fever chest pain, palpitations, shortness of breath, abdominal pain, fever, dysuria, cough, edema   Complains of being tired     I&O's Summary    06 Mar 2019 07:  -  07 Mar 2019 07:00  --------------------------------------------------------  IN: 1270 mL / OUT: 2170 mL / NET: -900 mL    07 Mar 2019 07:01  -  07 Mar 2019 16:14  --------------------------------------------------------  IN: 240 mL / OUT: 450 mL / NET: -210 mL      Allergies    Accupril (Other)    Intolerances      HEALTH ISSUES - PROBLEM Dx:  UTI (urinary tract infection): UTI (urinary tract infection)  Functional quadriplegia: Functional quadriplegia  Encounter for palliative care: Encounter for palliative care  KEITH (acute kidney injury): KEITH (acute kidney injury)  Acute on chronic diastolic (congestive) heart failure: Acute on chronic diastolic (congestive) heart failure  Pleural effusion: Pleural effusion  Hypertension: Hypertension  Rectal bleeding: Rectal bleeding  Blood per rectum: Blood per rectum  Failure to thrive in adult: Failure to thrive in adult  Chest pain: Chest pain  Prophylactic measure: Prophylactic measure  Acute on chronic congestive heart failure, unspecified heart failure type: Acute on chronic congestive heart failure, unspecified heart failure type  Acute on chronic respiratory failure with hypoxia and hypercapnia: Acute on chronic respiratory failure with hypoxia and hypercapnia  Obesity hypoventilation syndrome: Obesity hypoventilation syndrome  KYREE (obstructive sleep apnea): KYREE (obstructive sleep apnea)  Acute respiratory failure with hypercapnia: Acute respiratory failure with hypercapnia  CHF (congestive heart failure): CHF (congestive heart failure)  Acute on chronic right-sided congestive heart failure: Acute on chronic right-sided congestive heart failure  Type 2 diabetes mellitus without complication, without long-term current use of insulin: Type 2 diabetes mellitus without complication, without long-term current use of insulin  Hyperkalemia: Hyperkalemia  CAD (coronary artery disease): CAD (coronary artery disease)  Heart failure, acute diastolic: Heart failure, acute diastolic        PAST MEDICAL & SURGICAL HISTORY:  Herniated disc, cervical  PAD (peripheral artery disease)  Legally blind  History of peripheral vascular disease  History of retinal detachment  History of CEA (carotid endarterectomy): Right  Hyperlipidemia  Glaucoma  Hypertension  Diabetes  S/P CABG x 1  After cataract  Uveitic glaucoma of both eyes  Detached retina  Atherosclerosis of right carotid artery   delivery delivered          Vital Signs Last 24 Hrs  T(C): 36.6 (07 Mar 2019 15:40), Max: 37.3 (06 Mar 2019 21:39)  T(F): 97.8 (07 Mar 2019 15:40), Max: 99.1 (06 Mar 2019 21:39)  HR: 67 (07 Mar 2019 15:40) (62 - 70)  BP: 119/55 (07 Mar 2019 15:40) (116/66 - 130/68)  BP(mean): --  RR: 18 (07 Mar 2019 15:40) (17 - 19)  SpO2: 96% (07 Mar 2019 15:40) (93% - 100%)T(C): 36.6 (19 @ 15:40), Max: 37.3 (19 @ 21:39)  HR: 67 (19 @ 15:40) (62 - 70)  BP: 119/55 (19 @ 15:40) (116/66 - 130/68)  RR: 18 (19 @ 15:40) (17 - 19)  SpO2: 96% (19 @ 15:40) (93% - 100%)  Wt(kg): --    PHYSICAL EXAM:    GENERAL: NAD, OBESE , sitting up in chair looks tired   HEAD:  Atraumatic, Normocephalic  EYES: EOMI, PERRLA,   NERVOUS SYSTEM:  Alert & Oriented X3, in chair can Move upper and lower extremities; CNS-II-XII  CHEST/LUNG: Clear to auscultation bilaterally; No rales, rhonchi, wheezing,   HEART: Regular rate and rhythm; No murmurs,   ABDOMEN: Soft, Nontender, Nondistended; Bowel sounds present  EXTREMITIES:  Peripheral  edema    acetaminophen   Tablet .. 650 milliGRAM(s) Oral every 6 hours PRN  ALBUTerol    0.083% 10 milliGRAM(s) Nebulizer once  ALBUTerol/ipratropium for Nebulization 3 milliLiter(s) Nebulizer every 6 hours  amLODIPine   Tablet 10 milliGRAM(s) Oral daily  aspirin enteric coated 81 milliGRAM(s) Oral daily  atorvastatin 80 milliGRAM(s) Oral at bedtime  carvedilol 25 milliGRAM(s) Oral every 12 hours  dextrose 40% Gel 15 Gram(s) Oral once PRN  dextrose 5%. 1000 milliLiter(s) IV Continuous <Continuous>  dextrose 50% Injectable 12.5 Gram(s) IV Push once  dextrose 50% Injectable 25 Gram(s) IV Push once  dextrose 50% Injectable 25 Gram(s) IV Push once  docusate sodium 100 milliGRAM(s) Oral two times a day  DULoxetine 60 milliGRAM(s) Oral daily  epoetin sara Injectable 75838 Unit(s) SubCutaneous <User Schedule>  ertapenem  IVPB 1000 milliGRAM(s) IV Intermittent every 24 hours  gabapentin 100 milliGRAM(s) Oral three times a day  glucagon  Injectable 1 milliGRAM(s) IntraMuscular once PRN  glycopyrrolate Injectable 0.1 milliGRAM(s) IV Push every 8 hours PRN  guaiFENesin    Syrup 100 milliGRAM(s) Oral every 6 hours PRN  heparin  Injectable 5000 Unit(s) SubCutaneous every 12 hours  insulin glargine Injectable (LANTUS) 10 Unit(s) SubCutaneous at bedtime  insulin lispro (HumaLOG) corrective regimen sliding scale   SubCutaneous three times a day before meals  insulin lispro (HumaLOG) corrective regimen sliding scale   SubCutaneous at bedtime  insulin lispro Injectable (HumaLOG) 3 Unit(s) SubCutaneous three times a day before meals  isosorbide   mononitrate ER Tablet (IMDUR) 60 milliGRAM(s) Oral daily  magnesium oxide 400 milliGRAM(s) Oral daily  midodrine 2.5 milliGRAM(s) Oral three times a day PRN  ondansetron Injectable 4 milliGRAM(s) IV Push every 6 hours PRN  pantoprazole    Tablet 40 milliGRAM(s) Oral before breakfast  polyethylene glycol 3350 17 Gram(s) Oral daily  predniSONE   Tablet 40 milliGRAM(s) Oral daily  senna 2 Tablet(s) Oral at bedtime  torsemide 40 milliGRAM(s) Oral daily      LABS:                          7.4    9.2   )-----------( 171      ( 07 Mar 2019 06:12 )             21.5     03-07    144  |  95<L>  |  80.0<H>  ----------------------------<  240<H>  4.5   |  40.0<H>  |  1.62<H>    Ca    8.8      07 Mar 2019 06:12    TPro  5.7<L>  /  Alb  2.7<L>  /  TBili  1.1  /  DBili  x   /  AST  15  /  ALT  12  /  AlkPhos  122<H>  03-06    LIVER FUNCTIONS - ( 06 Mar 2019 06:14 )  Alb: 2.7 g/dL / Pro: 5.7 g/dL / ALK PHOS: 122 U/L / ALT: 12 U/L / AST: 15 U/L / GGT: x                   CAPILLARY BLOOD GLUCOSE      POCT Blood Glucose.: 302 mg/dL (07 Mar 2019 12:06)  POCT Blood Glucose.: 253 mg/dL (07 Mar 2019 08:08)  POCT Blood Glucose.: 291 mg/dL (06 Mar 2019 21:46)  POCT Blood Glucose.: 329 mg/dL (06 Mar 2019 17:08)      RADIOLOGY & ADDITIONAL TESTS:      Consultant notes reviewed    Case discussed with consultant/provider/ family /patient

## 2019-03-07 NOTE — PROGRESS NOTE ADULT - ASSESSMENT
CKD(III): ARF w/ decompensated CHF (R > L)  Elevated BUN also due to steroids  1.1g proteinuria  - cont Torsemide ==> will need to tolerate azotemia  - monitor labs  - avoid potential nephrotoxins    Anemia: +CRF +GIB  s/p IV Fe==> Tsat=27%  - cont QUENTIN  - monitor H/H  -  target Hgb=10.0  - consider PRBCs to maximize O2 carrying capacity

## 2019-03-07 NOTE — PROGRESS NOTE ADULT - SUBJECTIVE AND OBJECTIVE BOX
NEPHROLOGY INTERVAL HPI/OVERNIGHT EVENTS:  pt lying flat, comfortably  no acute distress noted    MEDICATIONS  (STANDING):  ALBUTerol    0.083% 10 milliGRAM(s) Nebulizer once  ALBUTerol/ipratropium for Nebulization 3 milliLiter(s) Nebulizer every 6 hours  amLODIPine   Tablet 10 milliGRAM(s) Oral daily  aspirin enteric coated 81 milliGRAM(s) Oral daily  atorvastatin 80 milliGRAM(s) Oral at bedtime  carvedilol 25 milliGRAM(s) Oral every 12 hours  dextrose 5%. 1000 milliLiter(s) (50 mL/Hr) IV Continuous <Continuous>  dextrose 50% Injectable 12.5 Gram(s) IV Push once  dextrose 50% Injectable 25 Gram(s) IV Push once  dextrose 50% Injectable 25 Gram(s) IV Push once  docusate sodium 100 milliGRAM(s) Oral two times a day  DULoxetine 60 milliGRAM(s) Oral daily  epoetin sara Injectable 83762 Unit(s) SubCutaneous <User Schedule>  ertapenem  IVPB 1000 milliGRAM(s) IV Intermittent every 24 hours  gabapentin 100 milliGRAM(s) Oral three times a day  heparin  Injectable 5000 Unit(s) SubCutaneous every 12 hours  insulin glargine Injectable (LANTUS) 10 Unit(s) SubCutaneous at bedtime  insulin lispro (HumaLOG) corrective regimen sliding scale   SubCutaneous three times a day before meals  insulin lispro (HumaLOG) corrective regimen sliding scale   SubCutaneous at bedtime  insulin lispro Injectable (HumaLOG) 3 Unit(s) SubCutaneous three times a day before meals  isosorbide   mononitrate ER Tablet (IMDUR) 60 milliGRAM(s) Oral daily  magnesium oxide 400 milliGRAM(s) Oral daily  pantoprazole    Tablet 40 milliGRAM(s) Oral before breakfast  polyethylene glycol 3350 17 Gram(s) Oral daily  predniSONE   Tablet 40 milliGRAM(s) Oral daily  senna 2 Tablet(s) Oral at bedtime  torsemide 40 milliGRAM(s) Oral daily    MEDICATIONS  (PRN):  acetaminophen   Tablet .. 650 milliGRAM(s) Oral every 6 hours PRN Mild Pain (1 - 3)  dextrose 40% Gel 15 Gram(s) Oral once PRN Blood Glucose LESS THAN 70 milliGRAM(s)/deciliter  glucagon  Injectable 1 milliGRAM(s) IntraMuscular once PRN Glucose LESS THAN 70 milligrams/deciliter  glycopyrrolate Injectable 0.1 milliGRAM(s) IV Push every 8 hours PRN secreations  guaiFENesin    Syrup 100 milliGRAM(s) Oral every 6 hours PRN Cough  midodrine 2.5 milliGRAM(s) Oral three times a day PRN systolic < 110  ondansetron Injectable 4 milliGRAM(s) IV Push every 6 hours PRN Nausea and/or Vomiting      Allergies    Accupril (Other)          Vital Signs Last 24 Hrs  T(C): 36.4 (07 Mar 2019 05:39), Max: 37.3 (06 Mar 2019 21:39)  T(F): 97.5 (07 Mar 2019 05:39), Max: 99.1 (06 Mar 2019 21:39)  HR: 62 (07 Mar 2019 09:00) (62 - 73)  BP: 116/66 (07 Mar 2019 09:00) (114/57 - 130/68)  BP(mean): --  RR: 18 (07 Mar 2019 09:00) (16 - 19)  SpO2: 100% (07 Mar 2019 09:00) (93% - 100%)    PHYSICAL EXAM:  Appears chronically ill  Cardiovascular: Normal S1 S2, No rub  Respiratory: Clear lungs with decreased BS at bases; R chest tube noted	  Neuro: A & O x 3; non focal  Gastrointestinal:  Soft, Non-tender, + BS	  Extremities: + LE edema    LABS:                        7.4    9.2   )-----------( 171      ( 07 Mar 2019 06:12 )             21.5     Hemoglobin: 8.1 g/dL (03.06.19 @ 06:14)        03-07    144  |  95<L>  |  80.0<H>  ----------------------------<  240<H>  4.5   |  40.0<H>  |  1.62<H>    Ca    8.8      07 Mar 2019 06:12    TPro  5.7<L>  /  Alb  2.7<L>  /  TBili  1.1  /  DBili  x   /  AST  15  /  ALT  12  /  AlkPhos  122<H>  03-06            RADIOLOGY & ADDITIONAL TESTS:  < from: Xray Chest 1 View- PORTABLE-Routine (03.07.19 @ 05:59) >   EXAM:  XR CHEST PORTABLE ROUTINE 1V                          PROCEDURE DATE:  03/07/2019          INTERPRETATION:  CHEST AP PORTABLE:    History: Abnormal Chest Sounds.     Date and time of exam: 3/7/2019 3:00 AM.    Technique: A single AP view ofthe chest was obtained.    Comparison exam: 3/6/2019 6:36 AM.    Findings:  Again noted is a right pigtail chest tube, unchanged. No evidence of   pneumothorax. Persistent cardiomegaly. Persistent airspace disease in the   left lung, unchanged..    Impression:  Stable exam without significant change since the previous study..      < end of copied text >

## 2019-03-07 NOTE — PROGRESS NOTE ADULT - ASSESSMENT
64 yo presenting to ED w/ SOB 2/2 diastolic HF exacerbation and noted to have KEITH and hyperkalemia, PMH CAD s/p CABG and PCI, PAD s/p baloon angioplasty, DM2, HTN, HLD, hx of cardiac arrest, recent GI bleed last month, Diastolic CHF, legally blind, pleural effusion requiring thoracentesis in Dec 2018, admitted for SOB, noted to have pleural effusion, started on bumex, s/p RRT noted on 3/1 for hypotension & lethargy. Pt became hypotensive after Bumex with albumin was given.  CXR with worsening plum edema b/l. BiPAP placed. ICU consult called for vasopressor support during IV diuresis. Midodrine ordered. She became increasingly lethargic with ABG showing resp acidosis with hypoxia. Pulmonary consult appreciated- started on IV steroids.     A/p    >Acute on Chronic Hypercapnic Respiratory Failure due to OHS/KYREE   Pulm recommend chronic NIV for chronic hypercarbic respiratory failure to improve symptoms and to decrease hospitalizations  non compliant with Bipap- counseled - used bipap last ngiht  Eventual SINGH with NIV  taper-- steroid  oral 40  - Avoid Narcotics for pain    >UTI - appreciate ID input - c/w IN invanz - duration per ID    >Chest pain with hx of CAD & CABG/PCI-  PRBC to improve oxygen to myocardium- goal around 10 . c/w Imdur, BB, statin, ASA. Plavix on hold since last admission due to GI Bleed.      > Lower GI Bleed likely diverticular-   - s/p 1 unit of PRBC  - s/p Colonoscopy -~ Poor prep throughout the colon. Diverticulosis seen. Unable to visualize colon sufficiently for poor prep  No further GI w/u recommended, ASA resumed as per GI, Plavix yet on hold  Advanced diet     >Hyperkalemia  - Hold Losartan    > Acute on Chronic Diastolic Heart Failure with severe PHTN  & right ventricular failure from OHS/ KYREE- now with pulm edema & pleural effusions  - not on lasix  c/w coreg & Imdur  - Hold Losartan due to hyperkalemia & now KEITH  - ECHO shows EF of > 75%. Moderate to Severe TR, mod-severe PHTN  - Demadex 40 mg qd.    >Pleural effusion  Chest tube on right draining. Maintain chest tube per CTS    > Acute on chronic kidney injury - improving  appreciate renal input - - cont Torsemide ==> will need to tolerate azotemia  ct hold losartan   f/u BMP     > CAD stents in 2014 / PAD / HLD / HTN  - Continue, Imdur, Lipitor, Coreg and Norvasc   - Plavix on hold since last admission due to GI Bleed. ASA resumed as per GI, plavix yet on hold    > DM 2  - HbA1c 5.6  - FS improving   - Lantus at 10 units at bedtime plus Humalog 3U   - Sliding scale changed to moderate coverage  - Steroids taper    > Anemia  - Multifactorial (chronic, now complicated with acute bleeding)  - s/p Venofer   - Continue Procrit  + stool for occult blood (will need GI work-up as outpt)  - Monitor CBC      > Transaminitis- improved  - Likely secondary to liver congestion    > History of Depression  - Continue Duloxetine 60 mg    >B/L UE swelling  - Arm elevation  - Warm compresses   - No DVT     Encephalopathy due to resp acidosis- ABG noted. Improves w AVAPS but again refused overnight     DVT Prophylaxis -- heparin sc resumed, f/u H/H

## 2019-03-07 NOTE — PROGRESS NOTE ADULT - ASSESSMENT
Pt is a 63y year old Female with past medical history pf PAD, PVD, legally blind, obese, right CEA, glaucoma, HTN, DM, CABG, was admitted 2/11 with SOB/LE edema, complicated inpatient course including lower GI bleed. Thoracic surgery was consulted for right pleural effusion, had right pleural pigtail placed in IR on 2/28/19. Chest tube was placed to waterseal on 3/6/19 with continued drainage. At this time, will maintain chest tube in place with repeat CXR in AM. Continue to record drainage every shift.

## 2019-03-07 NOTE — PROGRESS NOTE ADULT - SUBJECTIVE AND OBJECTIVE BOX
Subjective: "No, my breathing has been okay." Denies SOB, chest pain.    Vital Signs:  Vital Signs Last 24 Hrs  T(C): 36.6 (03-07-19 @ 10:15), Max: 37.3 (03-06-19 @ 21:39)  T(F): 97.9 (03-07-19 @ 10:15), Max: 99.1 (03-06-19 @ 21:39)  HR: 68 (03-07-19 @ 10:15) (62 - 71)  BP: 121/59 (03-07-19 @ 10:15) (116/66 - 130/68)  RR: 18 (03-07-19 @ 10:15) (17 - 19)  SpO2: 95% (03-07-19 @ 10:15) (93% - 100%) on (O2)    Telemetry/Alarms: telemetry reviewed, accelerated jxnl overnight    Relevant labs, radiology and Medications reviewed    Pertinent Physical Exam  Neuro: AxO x3  Cardiac: S1S2, no murmurs    03-06 @ 07:01  -  03-07 @ 07:00  --------------------------------------------------------  IN:    Oral Fluid: 720 mL    Solution: 50 mL    Solution: 500 mL  Total IN: 1270 mL    OUT:    Chest Tube: 370 mL    Indwelling Catheter - Urethral: 1500 mL    Voided: 300 mL  Total OUT: 2170 mL    Total NET: -900 mL      03-07 @ 07:01  -  03-07 @ 11:33  --------------------------------------------------------  IN:    Oral Fluid: 240 mL  Total IN: 240 mL    OUT:  Total OUT: 0 mL    Total NET: 240 mL

## 2019-03-08 LAB
ALBUMIN SERPL ELPH-MCNC: 2.8 G/DL — LOW (ref 3.3–5.2)
ALP SERPL-CCNC: 156 U/L — HIGH (ref 40–120)
ALT FLD-CCNC: 14 U/L — SIGNIFICANT CHANGE UP
ANION GAP SERPL CALC-SCNC: 10 MMOL/L — SIGNIFICANT CHANGE UP (ref 5–17)
AST SERPL-CCNC: 13 U/L — SIGNIFICANT CHANGE UP
BILIRUB SERPL-MCNC: 1.1 MG/DL — SIGNIFICANT CHANGE UP (ref 0.4–2)
BUN SERPL-MCNC: 82 MG/DL — HIGH (ref 8–20)
CALCIUM SERPL-MCNC: 8.6 MG/DL — SIGNIFICANT CHANGE UP (ref 8.6–10.2)
CHLORIDE SERPL-SCNC: 94 MMOL/L — LOW (ref 98–107)
CO2 SERPL-SCNC: 38 MMOL/L — HIGH (ref 22–29)
CREAT SERPL-MCNC: 1.51 MG/DL — HIGH (ref 0.5–1.3)
EOSINOPHIL # BLD AUTO: 0.2 K/UL — SIGNIFICANT CHANGE UP (ref 0–0.5)
EOSINOPHIL NFR BLD AUTO: 2.2 % — SIGNIFICANT CHANGE UP (ref 0–6)
GAS PNL BLDA: SIGNIFICANT CHANGE UP
GLUCOSE BLDC GLUCOMTR-MCNC: 199 MG/DL — HIGH (ref 70–99)
GLUCOSE BLDC GLUCOMTR-MCNC: 302 MG/DL — HIGH (ref 70–99)
GLUCOSE BLDC GLUCOMTR-MCNC: 313 MG/DL — HIGH (ref 70–99)
GLUCOSE BLDC GLUCOMTR-MCNC: 339 MG/DL — HIGH (ref 70–99)
GLUCOSE SERPL-MCNC: 271 MG/DL — HIGH (ref 70–115)
HCT VFR BLD CALC: 27.9 % — LOW (ref 37–47)
HGB BLD-MCNC: 9.5 G/DL — LOW (ref 12–16)
LYMPHOCYTES # BLD AUTO: 0.4 K/UL — LOW (ref 1–4.8)
LYMPHOCYTES # BLD AUTO: 5 % — LOW (ref 20–55)
MCHC RBC-ENTMCNC: 29.8 PG — SIGNIFICANT CHANGE UP (ref 27–31)
MCHC RBC-ENTMCNC: 34.1 G/DL — SIGNIFICANT CHANGE UP (ref 32–36)
MCV RBC AUTO: 87.5 FL — SIGNIFICANT CHANGE UP (ref 81–99)
MONOCYTES # BLD AUTO: 0.4 K/UL — SIGNIFICANT CHANGE UP (ref 0–0.8)
MONOCYTES NFR BLD AUTO: 4.4 % — SIGNIFICANT CHANGE UP (ref 3–10)
NEUTROPHILS # BLD AUTO: 7.6 K/UL — SIGNIFICANT CHANGE UP (ref 1.8–8)
NEUTROPHILS NFR BLD AUTO: 88.3 % — HIGH (ref 37–73)
PLATELET # BLD AUTO: 145 K/UL — LOW (ref 150–400)
POTASSIUM SERPL-MCNC: 3.8 MMOL/L — SIGNIFICANT CHANGE UP (ref 3.5–5.3)
POTASSIUM SERPL-SCNC: 3.8 MMOL/L — SIGNIFICANT CHANGE UP (ref 3.5–5.3)
PROT SERPL-MCNC: 5.7 G/DL — LOW (ref 6.6–8.7)
RBC # BLD: 3.19 M/UL — LOW (ref 4.4–5.2)
RBC # FLD: 15.9 % — HIGH (ref 11–15.6)
SODIUM SERPL-SCNC: 142 MMOL/L — SIGNIFICANT CHANGE UP (ref 135–145)
WBC # BLD: 8.6 K/UL — SIGNIFICANT CHANGE UP (ref 4.8–10.8)
WBC # FLD AUTO: 8.6 K/UL — SIGNIFICANT CHANGE UP (ref 4.8–10.8)

## 2019-03-08 PROCEDURE — 99233 SBSQ HOSP IP/OBS HIGH 50: CPT

## 2019-03-08 PROCEDURE — 99232 SBSQ HOSP IP/OBS MODERATE 35: CPT

## 2019-03-08 PROCEDURE — 71045 X-RAY EXAM CHEST 1 VIEW: CPT | Mod: 26,77

## 2019-03-08 PROCEDURE — 71250 CT THORAX DX C-: CPT | Mod: 26

## 2019-03-08 PROCEDURE — 71045 X-RAY EXAM CHEST 1 VIEW: CPT | Mod: 26

## 2019-03-08 RX ADMIN — CARVEDILOL PHOSPHATE 25 MILLIGRAM(S): 80 CAPSULE, EXTENDED RELEASE ORAL at 18:13

## 2019-03-08 RX ADMIN — INSULIN GLARGINE 10 UNIT(S): 100 INJECTION, SOLUTION SUBCUTANEOUS at 21:43

## 2019-03-08 RX ADMIN — Medication 40 MILLIGRAM(S): at 06:12

## 2019-03-08 RX ADMIN — ISOSORBIDE MONONITRATE 60 MILLIGRAM(S): 60 TABLET, EXTENDED RELEASE ORAL at 12:23

## 2019-03-08 RX ADMIN — Medication 3 MILLILITER(S): at 13:55

## 2019-03-08 RX ADMIN — Medication 3 MILLILITER(S): at 03:24

## 2019-03-08 RX ADMIN — AMLODIPINE BESYLATE 10 MILLIGRAM(S): 2.5 TABLET ORAL at 06:12

## 2019-03-08 RX ADMIN — HEPARIN SODIUM 5000 UNIT(S): 5000 INJECTION INTRAVENOUS; SUBCUTANEOUS at 06:12

## 2019-03-08 RX ADMIN — Medication 100 MILLIGRAM(S): at 18:13

## 2019-03-08 RX ADMIN — HEPARIN SODIUM 5000 UNIT(S): 5000 INJECTION INTRAVENOUS; SUBCUTANEOUS at 18:13

## 2019-03-08 RX ADMIN — Medication 4: at 21:43

## 2019-03-08 RX ADMIN — CARVEDILOL PHOSPHATE 25 MILLIGRAM(S): 80 CAPSULE, EXTENDED RELEASE ORAL at 06:12

## 2019-03-08 RX ADMIN — Medication 3 MILLILITER(S): at 08:17

## 2019-03-08 RX ADMIN — Medication 81 MILLIGRAM(S): at 12:23

## 2019-03-08 RX ADMIN — DULOXETINE HYDROCHLORIDE 60 MILLIGRAM(S): 30 CAPSULE, DELAYED RELEASE ORAL at 12:23

## 2019-03-08 RX ADMIN — Medication 2: at 12:23

## 2019-03-08 RX ADMIN — ERTAPENEM SODIUM 120 MILLIGRAM(S): 1 INJECTION, POWDER, LYOPHILIZED, FOR SOLUTION INTRAMUSCULAR; INTRAVENOUS at 00:11

## 2019-03-08 RX ADMIN — GABAPENTIN 100 MILLIGRAM(S): 400 CAPSULE ORAL at 06:12

## 2019-03-08 RX ADMIN — ATORVASTATIN CALCIUM 80 MILLIGRAM(S): 80 TABLET, FILM COATED ORAL at 21:44

## 2019-03-08 RX ADMIN — MAGNESIUM OXIDE 400 MG ORAL TABLET 400 MILLIGRAM(S): 241.3 TABLET ORAL at 12:23

## 2019-03-08 RX ADMIN — Medication 100 MILLIGRAM(S): at 06:12

## 2019-03-08 RX ADMIN — GABAPENTIN 100 MILLIGRAM(S): 400 CAPSULE ORAL at 12:23

## 2019-03-08 RX ADMIN — SENNA PLUS 2 TABLET(S): 8.6 TABLET ORAL at 21:44

## 2019-03-08 RX ADMIN — Medication 8: at 18:02

## 2019-03-08 RX ADMIN — Medication 3 UNIT(S): at 08:28

## 2019-03-08 RX ADMIN — ERTAPENEM SODIUM 120 MILLIGRAM(S): 1 INJECTION, POWDER, LYOPHILIZED, FOR SOLUTION INTRAMUSCULAR; INTRAVENOUS at 21:42

## 2019-03-08 RX ADMIN — GABAPENTIN 100 MILLIGRAM(S): 400 CAPSULE ORAL at 21:44

## 2019-03-08 RX ADMIN — Medication 3 UNIT(S): at 12:24

## 2019-03-08 RX ADMIN — PANTOPRAZOLE SODIUM 40 MILLIGRAM(S): 20 TABLET, DELAYED RELEASE ORAL at 06:12

## 2019-03-08 RX ADMIN — Medication 8: at 08:27

## 2019-03-08 RX ADMIN — Medication 3 MILLILITER(S): at 20:29

## 2019-03-08 RX ADMIN — Medication 3 UNIT(S): at 18:05

## 2019-03-08 NOTE — PROGRESS NOTE ADULT - SUBJECTIVE AND OBJECTIVE BOX
Internal Medicine Hospitalist - Dr. Lobo ROCHA    [id number]    63y      Female    Patient is a 63y old  Female who presents with a chief complaint of SOB (08 Mar 2019 10:51)    INTERVAL HPI/ OVERNIGHT EVENTS: Patient is seen and examined, pt is s/p RR today for possible aspiration, afebrile, denied chest pain, abd. pain, nausea and vomiting    REVIEW OF SYSTEMS:    Denied fever, chills, abd. pain, nausea, vomiting, chest pain, headache, dizziness    PHYSICAL EXAM:    Vital Signs Last 24 Hrs  T(C): 37.1 (08 Mar 2019 10:00), Max: 37.2 (08 Mar 2019 00:09)  T(F): 98.7 (08 Mar 2019 10:00), Max: 98.9 (08 Mar 2019 00:09)  HR: 68 (08 Mar 2019 10:00) (64 - 97)  BP: 117/65 (08 Mar 2019 10:00) (96/56 - 143/65)  BP(mean): --  RR: 17 (08 Mar 2019 10:00) (14 - 18)  SpO2: 67% (08 Mar 2019 09:35) (67% - 97%)    GENERAL: NAD  CHEST/LUNG: positive few crackles over bases  HEART: S1S2+ audible  ABDOMEN: Soft, Nontender, Nondistended; Bowel sounds present  EXTREMITIES:  no edema  CNS: AAO X 3  Psychiatry: normal mood    LABS:                        9.5    8.6   )-----------( 145      ( 08 Mar 2019 05:50 )             27.9     03-08    142  |  94<L>  |  82.0<H>  ----------------------------<  271<H>  3.8   |  38.0<H>  |  1.51<H>    Ca    8.6      08 Mar 2019 05:50    TPro  5.7<L>  /  Alb  2.8<L>  /  TBili  1.1  /  DBili  x   /  AST  13  /  ALT  14  /  AlkPhos  156<H>  03-08            MEDICATIONS  (STANDING):  ALBUTerol    0.083% 10 milliGRAM(s) Nebulizer once  ALBUTerol/ipratropium for Nebulization 3 milliLiter(s) Nebulizer every 6 hours  amLODIPine   Tablet 10 milliGRAM(s) Oral daily  aspirin enteric coated 81 milliGRAM(s) Oral daily  atorvastatin 80 milliGRAM(s) Oral at bedtime  carvedilol 25 milliGRAM(s) Oral every 12 hours  dextrose 5%. 1000 milliLiter(s) (50 mL/Hr) IV Continuous <Continuous>  dextrose 50% Injectable 12.5 Gram(s) IV Push once  dextrose 50% Injectable 25 Gram(s) IV Push once  dextrose 50% Injectable 25 Gram(s) IV Push once  docusate sodium 100 milliGRAM(s) Oral two times a day  DULoxetine 60 milliGRAM(s) Oral daily  epoetin sara Injectable 29363 Unit(s) SubCutaneous <User Schedule>  ertapenem  IVPB 1000 milliGRAM(s) IV Intermittent every 24 hours  gabapentin 100 milliGRAM(s) Oral three times a day  heparin  Injectable 5000 Unit(s) SubCutaneous every 12 hours  insulin glargine Injectable (LANTUS) 10 Unit(s) SubCutaneous at bedtime  insulin lispro (HumaLOG) corrective regimen sliding scale   SubCutaneous three times a day before meals  insulin lispro (HumaLOG) corrective regimen sliding scale   SubCutaneous at bedtime  insulin lispro Injectable (HumaLOG) 3 Unit(s) SubCutaneous three times a day before meals  isosorbide   mononitrate ER Tablet (IMDUR) 60 milliGRAM(s) Oral daily  magnesium oxide 400 milliGRAM(s) Oral daily  pantoprazole    Tablet 40 milliGRAM(s) Oral before breakfast  polyethylene glycol 3350 17 Gram(s) Oral daily  predniSONE   Tablet 40 milliGRAM(s) Oral daily  senna 2 Tablet(s) Oral at bedtime  torsemide 40 milliGRAM(s) Oral daily    MEDICATIONS  (PRN):  acetaminophen   Tablet .. 650 milliGRAM(s) Oral every 6 hours PRN Mild Pain (1 - 3)  dextrose 40% Gel 15 Gram(s) Oral once PRN Blood Glucose LESS THAN 70 milliGRAM(s)/deciliter  glucagon  Injectable 1 milliGRAM(s) IntraMuscular once PRN Glucose LESS THAN 70 milligrams/deciliter  glycopyrrolate Injectable 0.1 milliGRAM(s) IV Push every 8 hours PRN secreations  guaiFENesin    Syrup 100 milliGRAM(s) Oral every 6 hours PRN Cough  midodrine 2.5 milliGRAM(s) Oral three times a day PRN systolic < 110  ondansetron Injectable 4 milliGRAM(s) IV Push every 6 hours PRN Nausea and/or Vomiting      RADIOLOGY & ADDITIONAL TEST

## 2019-03-08 NOTE — CHART NOTE - NSCHARTNOTEFT_GEN_A_CORE
Rapid Response PGY1 Note  Patient is a 63y old  Female PMH CAD s/p CABG and PCI, PAD s/p balloon angioplasty, DM2, HTN, HLD, hx of cardiac arrest, recent GI bleed last month, Diastolic CHF, legally blind, pleural effusion requiring thoracentesis in Dec 2018 admitted for sob 2/2 CHF-pEF exacerbation.   Rapid response team called because patient was unresponsive for a few seconds and desaturated to 76% on 2L NC after choking on alexander and pancakes. Per nurse at bedside, patient reports that she didn't chew her food and swallowed quickly but shortly after became responsive again.   Upon arrival nursing staff and respiratory therapist were at bedside. ABG was requested.  Patient was seen and examined at the bedside by the rapid response team. Pt was AAO X3, speaking clearly in full sentences. Denies fever, n/v, CP, sob, abdominal pain, calf tenderness.    Allergies  Accupril (Other)    Vital signs (Initial rapid response at 9:09)  BP: 96/54 (soft bp since admission)  HR: 64  RR: 20   O2sat: 76% on 2L O2 NC  Blood glucose: 313 (pt received insulin dose at 8:30)    GENERAL: Obese, AAOX3, laying in bed comfortably, in no apparent distress.   HEENT: NC/AT, EOMI, Mucous membranes are moist. Cataract in R eye. No JVD.  NECK: Supple  LUNGS: CTA b/l, no wheezing, rales or rhonchi; respirations unlabored, right chest tube draining serosanguinous fluid   HEART: RRR, Normal +S1/+S2, no murmurs, rubs, gallops  GI: Soft, NT, ND, BS +4  EXTREMITIES: No edema or cyanosis, +2 DP b/l  SKIN: warm, dry  MSK: FROM  NEUROLOGIC: No focal deficits, no pronator drift       03-07 @ 07:01  -  03-08 @ 07:00  --------------------------------------------------------  IN: 890 mL / OUT: 1620 mL / NET: -730 mL               9.5    8.6   )-----------( 145      ( 08 Mar 2019 05:50 )             27.9     03-08    142  |  94<L>  |  82.0<H>  ----------------------------<  271<H>  3.8   |  38.0<H>  |  1.51<H>    Ca    8.6      08 Mar 2019 05:50    TPro  5.7<L>  /  Alb  2.8<L>  /  TBili  1.1  /  DBili  x   /  AST  13  /  ALT  14  /  AlkPhos  156<H>  03-08         LIVER FUNCTIONS - ( 08 Mar 2019 05:50 )  Alb: 2.8 g/dL / Pro: 5.7 g/dL / ALK PHOS: 156 U/L / ALT: 14 U/L / AST: 13 U/L / GGT: x            MEDICATIONS  (STANDING):  ALBUTerol    0.083% 10 milliGRAM(s) Nebulizer once  ALBUTerol/ipratropium for Nebulization 3 milliLiter(s) Nebulizer every 6 hours  amLODIPine   Tablet 10 milliGRAM(s) Oral daily  aspirin enteric coated 81 milliGRAM(s) Oral daily  atorvastatin 80 milliGRAM(s) Oral at bedtime  carvedilol 25 milliGRAM(s) Oral every 12 hours  dextrose 5%. 1000 milliLiter(s) (50 mL/Hr) IV Continuous <Continuous>  dextrose 50% Injectable 12.5 Gram(s) IV Push once  dextrose 50% Injectable 25 Gram(s) IV Push once  dextrose 50% Injectable 25 Gram(s) IV Push once  docusate sodium 100 milliGRAM(s) Oral two times a day  DULoxetine 60 milliGRAM(s) Oral daily  epoetin sara Injectable 59325 Unit(s) SubCutaneous <User Schedule>  ertapenem  IVPB 1000 milliGRAM(s) IV Intermittent every 24 hours  gabapentin 100 milliGRAM(s) Oral three times a day  heparin  Injectable 5000 Unit(s) SubCutaneous every 12 hours  insulin glargine Injectable (LANTUS) 10 Unit(s) SubCutaneous at bedtime  insulin lispro (HumaLOG) corrective regimen sliding scale   SubCutaneous three times a day before meals  insulin lispro (HumaLOG) corrective regimen sliding scale   SubCutaneous at bedtime  insulin lispro Injectable (HumaLOG) 3 Unit(s) SubCutaneous three times a day before meals  isosorbide   mononitrate ER Tablet (IMDUR) 60 milliGRAM(s) Oral daily  magnesium oxide 400 milliGRAM(s) Oral daily  pantoprazole    Tablet 40 milliGRAM(s) Oral before breakfast  polyethylene glycol 3350 17 Gram(s) Oral daily  predniSONE   Tablet 40 milliGRAM(s) Oral daily  senna 2 Tablet(s) Oral at bedtime  torsemide 40 milliGRAM(s) Oral daily    MEDICATIONS  (PRN):  acetaminophen   Tablet .. 650 milliGRAM(s) Oral every 6 hours PRN Mild Pain (1 - 3)  dextrose 40% Gel 15 Gram(s) Oral once PRN Blood Glucose LESS THAN 70 milliGRAM(s)/deciliter  glucagon  Injectable 1 milliGRAM(s) IntraMuscular once PRN Glucose LESS THAN 70 milligrams/deciliter  glycopyrrolate Injectable 0.1 milliGRAM(s) IV Push every 8 hours PRN secreations  guaiFENesin    Syrup 100 milliGRAM(s) Oral every 6 hours PRN Cough  midodrine 2.5 milliGRAM(s) Oral three times a day PRN systolic < 110  ondansetron Injectable 4 milliGRAM(s) IV Push every 6 hours PRN Nausea and/or Vomiting      Assessment and Plan- Rapid Response called for 63y year old Female with PMH CAD s/p CABG and PCI, PAD s/p balloon angioplasty, DM2, HTN, HLD, hx of cardiac arrest, recent GI bleed last month, Diastolic CHF, legally blind, pleural effusion requiring thoracentesis in Dec 2018 admitted for sob 2/2 CHF-pEF exacerbation. Rapid response team called because patient was unresponsive for a few seconds and desaturated to 76% after choking on alexander and pancakes. Attending, Dr. Diamond at bedside. Patient was evaluated. ABG and CXR ordered. With concerns for aspiration, speech and swallow eval ordered and patient was made NPO with aspiration precautions. Patient maintained on 5L NC with goal 88-92%. Dr. Diamond at bedside agrees with plan.     -ABG w/ lytes  -CXR   -Labs am noted, no leukocytosis, no need to repeat at this time  -Speech and swallow eval   -NPO except medications  -Aspiration and fall precautions     At end of rapid response  O2 sat: 93L on 6L NC, was lowered to 5.5L for now     Continue with management as per primary team. Will reassess and follow up labs. Attending, Dr. Diamond at bedside, agrees with plan. Rapid Response PGY1 Note  Patient is a 63y old  Female PMH CAD s/p CABG and PCI, PAD s/p balloon angioplasty, DM2, HTN, HLD, hx of cardiac arrest, recent GI bleed last month, Diastolic CHF, legally blind, pleural effusion requiring thoracentesis in Dec 2018 admitted for sob 2/2 CHF-pEF exacerbation.   Rapid response team called because patient was unresponsive for a few seconds and desaturated to 76% on 2L NC after choking on alexander and pancakes. Per nurse at bedside, patient reports that she didn't chew her food and swallowed quickly but shortly after became responsive again.   Upon arrival nursing staff and respiratory therapist were at bedside. ABG was requested.  Patient was seen and examined at the bedside by the rapid response team. Pt was AAO X3, speaking clearly in full sentences. Denies fever, n/v, CP, sob, abdominal pain, calf tenderness.    Allergies  Accupril (Other)    Vital signs (Initial rapid response at 9:09)  BP: 96/54 (soft bp since admission)  HR: 64  RR: 20   O2sat: 76% on 2L O2 NC  Blood glucose: 313 (pt received insulin dose at 8:30)    GENERAL: Obese, AAOX3, laying in bed comfortably, in no apparent distress.   HEENT: NC/AT, EOMI, Mucous membranes are moist. Cataract in R eye. No JVD.  NECK: Supple  LUNGS: CTA b/l, no wheezing, rales or rhonchi; respirations unlabored, right chest tube draining serosanguinous fluid   HEART: RRR, Normal +S1/+S2, no murmurs, rubs, gallops  GI: Soft, NT, ND, BS +4  EXTREMITIES: No edema or cyanosis, +2 DP b/l  SKIN: warm, dry  MSK: FROM  NEUROLOGIC: No focal deficits, no pronator drift       03-07 @ 07:01  -  03-08 @ 07:00  --------------------------------------------------------  IN: 890 mL / OUT: 1620 mL / NET: -730 mL               9.5    8.6   )-----------( 145      ( 08 Mar 2019 05:50 )             27.9     03-08    142  |  94<L>  |  82.0<H>  ----------------------------<  271<H>  3.8   |  38.0<H>  |  1.51<H>    Ca    8.6      08 Mar 2019 05:50    TPro  5.7<L>  /  Alb  2.8<L>  /  TBili  1.1  /  DBili  x   /  AST  13  /  ALT  14  /  AlkPhos  156<H>  03-08         LIVER FUNCTIONS - ( 08 Mar 2019 05:50 )  Alb: 2.8 g/dL / Pro: 5.7 g/dL / ALK PHOS: 156 U/L / ALT: 14 U/L / AST: 13 U/L / GGT: x            MEDICATIONS  (STANDING):  ALBUTerol    0.083% 10 milliGRAM(s) Nebulizer once  ALBUTerol/ipratropium for Nebulization 3 milliLiter(s) Nebulizer every 6 hours  amLODIPine   Tablet 10 milliGRAM(s) Oral daily  aspirin enteric coated 81 milliGRAM(s) Oral daily  atorvastatin 80 milliGRAM(s) Oral at bedtime  carvedilol 25 milliGRAM(s) Oral every 12 hours  dextrose 5%. 1000 milliLiter(s) (50 mL/Hr) IV Continuous <Continuous>  dextrose 50% Injectable 12.5 Gram(s) IV Push once  dextrose 50% Injectable 25 Gram(s) IV Push once  dextrose 50% Injectable 25 Gram(s) IV Push once  docusate sodium 100 milliGRAM(s) Oral two times a day  DULoxetine 60 milliGRAM(s) Oral daily  epoetin asra Injectable 13637 Unit(s) SubCutaneous <User Schedule>  ertapenem  IVPB 1000 milliGRAM(s) IV Intermittent every 24 hours  gabapentin 100 milliGRAM(s) Oral three times a day  heparin  Injectable 5000 Unit(s) SubCutaneous every 12 hours  insulin glargine Injectable (LANTUS) 10 Unit(s) SubCutaneous at bedtime  insulin lispro (HumaLOG) corrective regimen sliding scale   SubCutaneous three times a day before meals  insulin lispro (HumaLOG) corrective regimen sliding scale   SubCutaneous at bedtime  insulin lispro Injectable (HumaLOG) 3 Unit(s) SubCutaneous three times a day before meals  isosorbide   mononitrate ER Tablet (IMDUR) 60 milliGRAM(s) Oral daily  magnesium oxide 400 milliGRAM(s) Oral daily  pantoprazole    Tablet 40 milliGRAM(s) Oral before breakfast  polyethylene glycol 3350 17 Gram(s) Oral daily  predniSONE   Tablet 40 milliGRAM(s) Oral daily  senna 2 Tablet(s) Oral at bedtime  torsemide 40 milliGRAM(s) Oral daily    MEDICATIONS  (PRN):  acetaminophen   Tablet .. 650 milliGRAM(s) Oral every 6 hours PRN Mild Pain (1 - 3)  dextrose 40% Gel 15 Gram(s) Oral once PRN Blood Glucose LESS THAN 70 milliGRAM(s)/deciliter  glucagon  Injectable 1 milliGRAM(s) IntraMuscular once PRN Glucose LESS THAN 70 milligrams/deciliter  glycopyrrolate Injectable 0.1 milliGRAM(s) IV Push every 8 hours PRN secreations  guaiFENesin    Syrup 100 milliGRAM(s) Oral every 6 hours PRN Cough  midodrine 2.5 milliGRAM(s) Oral three times a day PRN systolic < 110  ondansetron Injectable 4 milliGRAM(s) IV Push every 6 hours PRN Nausea and/or Vomiting      Assessment and Plan- Rapid Response called for 63y year old Female with PMH CAD s/p CABG and PCI, PAD s/p balloon angioplasty, DM2, HTN, HLD, hx of cardiac arrest, recent GI bleed last month, Diastolic CHF, legally blind, pleural effusion requiring thoracentesis in Dec 2018 admitted for sob 2/2 CHF-pEF exacerbation. Rapid response team called because patient was unresponsive for a few seconds and desaturated to 76% after choking on alexander and pancakes. Attending, Dr. Diamond at bedside. Patient was evaluated. ABG and CXR ordered. With concerns for aspiration, speech and swallow eval ordered and patient was made NPO with aspiration precautions. Patient maintained on 5L NC with goal 88-92%. Dr. Diamond at bedside agrees with plan.     -ABG w/ lytes  -CXR   -Labs am noted, no leukocytosis, no need to repeat at this time  -Speech and swallow eval   -NPO except medications  -Aspiration and fall precautions     At end of rapid response  O2 sat: 93L on 6L NC, was lowered to 5.5L for now     Continue with management as per primary team. Will reassess and follow up labs. Attending, Dr. Diamond at bedside, agrees with plan.    ____________________________________________________________  Rapid Response Follow up     Patient seen and examined at bedside, pt was sitting in chair, speaking comfortably and in no acute distress. Family member at bedside. Patient's O2 sat 97% on 5.5L via NC. Nurse titrated down O2 to 4.5L. ABG reviewed, hypercapnia noted (increased from last ABG analysis). CXR noted, no change from prior done this morning. Speech and swallow consult noted and appreciated - recommends regular diet with thin feeds, crush medications if feasible. Attending, Dr. Diamond aware of findings.

## 2019-03-08 NOTE — SWALLOW BEDSIDE ASSESSMENT ADULT - SWALLOW EVAL: RECOMMENDED FEEDING/EATING TECHNIQUES
oral hygiene/small sips/bites/hard swallow w/ each bite or sip/position upright (90 degrees)/crush medication (when feasible)/maintain upright posture during/after eating for 30 mins

## 2019-03-08 NOTE — PROGRESS NOTE ADULT - ASSESSMENT
KEITH on CKD stage III in setting of decompensated CHF (R > L)  Cr cont slow daily improvement  Elevated BUN also due to steroids  1.1g proteinuria  - cont Torsemide ==> will need to tolerate azotemia  - monitor labs  - avoid potential nephrotoxins    Anemia: +CRF +GIB  s/p IV Fe==> Tsat=27%  - cont UQENTIN  - monitor H/H  -  target Hgb=10.0  - consider PRBCs to maximize O2 carrying capacity    Will follow

## 2019-03-08 NOTE — SWALLOW BEDSIDE ASSESSMENT ADULT - SLP PERTINENT HISTORY OF CURRENT PROBLEM
As per h&p:  presenting to ED w/ SOB 2/2 diastolic HF exacerbation and noted to have KEITH and hyperkalemia, PMH CAD s/p CABG and PCI, PAD s/p baloon angioplasty, DM2, HTN, HLD, hx of cardiac arrest, recent GI bleed last month, Diastolic CHF, legally blind, pleural effusion requiring thoracentesis in Dec 2018. On 3/8/19:pt had choking episode w/breakfast. Rapid response team called because patient was unresponsive for a few seconds & desaturated to 76% on 2L NC after choking on alexander and pancakes. Per nurse at bedside, patient reports that she didn't chew her food & swallowed quickly but shortly after became responsive again.

## 2019-03-08 NOTE — PROGRESS NOTE ADULT - SUBJECTIVE AND OBJECTIVE BOX
NEPHROLOGY INTERVAL HPI/OVERNIGHT EVENTS:    Examined earlier    MEDICATIONS  (STANDING):  ALBUTerol    0.083% 10 milliGRAM(s) Nebulizer once  ALBUTerol/ipratropium for Nebulization 3 milliLiter(s) Nebulizer every 6 hours  amLODIPine   Tablet 10 milliGRAM(s) Oral daily  aspirin enteric coated 81 milliGRAM(s) Oral daily  atorvastatin 80 milliGRAM(s) Oral at bedtime  carvedilol 25 milliGRAM(s) Oral every 12 hours  dextrose 5%. 1000 milliLiter(s) (50 mL/Hr) IV Continuous <Continuous>  dextrose 50% Injectable 12.5 Gram(s) IV Push once  dextrose 50% Injectable 25 Gram(s) IV Push once  dextrose 50% Injectable 25 Gram(s) IV Push once  docusate sodium 100 milliGRAM(s) Oral two times a day  DULoxetine 60 milliGRAM(s) Oral daily  epoetin sara Injectable 32719 Unit(s) SubCutaneous <User Schedule>  ertapenem  IVPB 1000 milliGRAM(s) IV Intermittent every 24 hours  gabapentin 100 milliGRAM(s) Oral three times a day  heparin  Injectable 5000 Unit(s) SubCutaneous every 12 hours  insulin glargine Injectable (LANTUS) 10 Unit(s) SubCutaneous at bedtime  insulin lispro (HumaLOG) corrective regimen sliding scale   SubCutaneous three times a day before meals  insulin lispro (HumaLOG) corrective regimen sliding scale   SubCutaneous at bedtime  insulin lispro Injectable (HumaLOG) 3 Unit(s) SubCutaneous three times a day before meals  isosorbide   mononitrate ER Tablet (IMDUR) 60 milliGRAM(s) Oral daily  magnesium oxide 400 milliGRAM(s) Oral daily  pantoprazole    Tablet 40 milliGRAM(s) Oral before breakfast  polyethylene glycol 3350 17 Gram(s) Oral daily  predniSONE   Tablet 40 milliGRAM(s) Oral daily  senna 2 Tablet(s) Oral at bedtime  torsemide 40 milliGRAM(s) Oral daily    MEDICATIONS  (PRN):  acetaminophen   Tablet .. 650 milliGRAM(s) Oral every 6 hours PRN Mild Pain (1 - 3)  dextrose 40% Gel 15 Gram(s) Oral once PRN Blood Glucose LESS THAN 70 milliGRAM(s)/deciliter  glucagon  Injectable 1 milliGRAM(s) IntraMuscular once PRN Glucose LESS THAN 70 milligrams/deciliter  glycopyrrolate Injectable 0.1 milliGRAM(s) IV Push every 8 hours PRN secreations  guaiFENesin    Syrup 100 milliGRAM(s) Oral every 6 hours PRN Cough  midodrine 2.5 milliGRAM(s) Oral three times a day PRN systolic < 110  ondansetron Injectable 4 milliGRAM(s) IV Push every 6 hours PRN Nausea and/or Vomiting      Allergies    Accupril (Other)    Intolerances        Vital Signs Last 24 Hrs  T(C): 36.7 (08 Mar 2019 15:46), Max: 37.2 (08 Mar 2019 00:09)  T(F): 98 (08 Mar 2019 15:46), Max: 98.9 (08 Mar 2019 00:09)  HR: 67 (08 Mar 2019 16:40) (64 - 97)  BP: 118/56 (08 Mar 2019 15:46) (96/56 - 143/65)  BP(mean): --  RR: 18 (08 Mar 2019 16:40) (14 - 18)  SpO2: 95% (08 Mar 2019 16:40) (67% - 98%)  Daily     Daily Weight in k.5 (08 Mar 2019 06:05)    PHYSICAL EXAM:  Appears chronically ill  Cardiovascular: Normal S1 S2, No rub  Respiratory: Clear lungs with decreased BS at bases; R chest tube noted	  Neuro: A & O x 3; non focal  Gastrointestinal:  Soft, Non-tender, + BS	  Extremities: + LE edema    LABS:                        9.5    8.6   )-----------( 145      ( 08 Mar 2019 05:50 )             27.9     03-08    142  |  94<L>  |  82.0<H>  ----------------------------<  271<H>  3.8   |  38.0<H>  |  1.51<H>    Ca    8.6      08 Mar 2019 05:50    TPro  5.7<L>  /  Alb  2.8<L>  /  TBili  1.1  /  DBili  x   /  AST  13  /  ALT  14  /  AlkPhos  156<H>  03-08          ABG - ( 08 Mar 2019 09:45 )  pH, Arterial: 7.47  pH, Blood: x     /  pCO2: 61    /  pO2: 59    / HCO3: 42    / Base Excess: 17.7  /  SaO2: 92                    RADIOLOGY & ADDITIONAL TESTS:

## 2019-03-08 NOTE — PROGRESS NOTE ADULT - ASSESSMENT
Pt is a 63y year old Female with past medical history pf PAD, PVD, legally blind, obese, right CEA, glaucoma, HTN, DM, CABG, was admitted 2/11 with SOB/LE edema, complicated inpatient course including lower GI bleed. Thoracic surgery was consulted for right pleural effusion, had right pleural pigtail placed in IR on 2/28/19. Chest tube was placed to waterseal on 3/6/19 with continued drainage. RRT event 2/2 ?aspiration on 3/8 noted. At this time, will maintain chest tube in place with repeat CXR in AM. Continue to record drainage every shift.

## 2019-03-08 NOTE — PROGRESS NOTE ADULT - ASSESSMENT
62 yo presenting to ED w/ SOB 2/2 diastolic HF exacerbation and noted to have KEITH and hyperkalemia, PMH CAD s/p CABG and PCI, PAD s/p baloon angioplasty, DM2, HTN, HLD, hx of cardiac arrest, recent GI bleed last month, Diastolic CHF, legally blind, pleural effusion requiring thoracentesis in Dec 2018, admitted for SOB, noted to have pleural effusion, started on bumex, s/p RRT noted on 3/1 for hypotension & lethargy. Pt became hypotensive after Bumex with albumin was given.  CXR with worsening plum edema b/l. BiPAP placed. ICU consult called for vasopressor support during IV diuresis. Midodrine ordered. She became increasingly lethargic with ABG showing resp acidosis with hypoxia. Pulmonary consult appreciated- started on IV steroids, feeling better, started on steroid taper.     A/p    >Acute on Chronic Hypercapnic Respiratory Failure due to OHS/KYREE   Pulm recommend chronic NIV for chronic hypercarbic respiratory failure to improve symptoms and to decrease hospitalizations  non compliant with Bipap- counseled - using bipap now  Eventual SINGH with NIV  taper-- steroid  oral 40 and taper as per pulmonary  - Avoid Narcotics for pain    >UTI - appreciate ID input - c/w IN invanz - end date 03/10    >Dysphagia - had a RR due to aspiration, speech eval appreciated - recommend regular diet with thin liquid    >Chest pain with hx of CAD & CABG/PCI-  PRBC to improve oxygen to myocardium- goal around 10 . c/w Imdur, BB, statin, ASA. Plavix on hold since last admission due to GI Bleed.      > Lower GI Bleed likely diverticular-   - s/p 1 unit of PRBC  - s/p Colonoscopy -~ Poor prep throughout the colon. Diverticulosis seen. Unable to visualize colon sufficiently for poor prep  No further GI w/u recommended, ASA resumed as per GI, Plavix yet on hold  Advanced diet     >Hyperkalemia  - Hold Losartan    > Acute on Chronic Diastolic Heart Failure with severe PHTN  & right ventricular failure from OHS/ KYREE- now with pulm edema & pleural effusions  - not on lasix  c/w coreg & Imdur  - Hold Losartan due to hyperkalemia & now KEITH  - ECHO shows EF of > 75%. Moderate to Severe TR, mod-severe PHTN  - Demadex 40 mg qd.    >Pleural effusion  Chest tube on right draining. Maintain chest tube per CTS    > Acute on chronic kidney injury - improving  appreciate renal input - - cont Torsemide ==> will need to tolerate azotemia  ct hold losartan   f/u BMP     > CAD stents in 2014 / PAD / HLD / HTN  - Continue, Imdur, Lipitor, Coreg and Norvasc   - Plavix on hold since last admission due to GI Bleed. ASA resumed as per GI, plavix yet on hold    > DM 2  - HbA1c 5.6  - FS improving   - Lantus at 10 units at bedtime plus Humalog 3U   - Sliding scale changed to moderate coverage  - Steroids taper    > Anemia  - Multifactorial (chronic, now complicated with acute bleeding)  - s/p Venofer   - Continue Procrit  + stool for occult blood (will need GI work-up as outpt)  - Monitor CBC      > Transaminitis- improved  - Likely secondary to liver congestion    > History of Depression  - Continue Duloxetine 60 mg    >B/L UE swelling  - Arm elevation  - Warm compresses   - No DVT     Encephalopathy due to resp acidosis- ABG noted. Improves w AVAPS but again refused overnight     DVT Prophylaxis -- heparin sc resumed,

## 2019-03-08 NOTE — PROGRESS NOTE ADULT - SUBJECTIVE AND OBJECTIVE BOX
PULMONARY PROGRESS NOTE      RANJIT [id number]    Patient is a 63y old  Female who presents with a chief complaint of SOB (08 Mar 2019 13:49)      INTERVAL HPI/OVERNIGHT EVENTS:  Patient on nasal O2   No distress  Drainage from chest tube variable>was 300 cc yesterday per nurse, today only 10cc  CXR done reviewed>remains with what looks to be significant effusion on right.  NO pulmonary edema per report>significant over interpretation of soft tissue attenuation related to obesity.   MEDICATIONS  (STANDING):  ALBUTerol    0.083% 10 milliGRAM(s) Nebulizer once  ALBUTerol/ipratropium for Nebulization 3 milliLiter(s) Nebulizer every 6 hours  amLODIPine   Tablet 10 milliGRAM(s) Oral daily  aspirin enteric coated 81 milliGRAM(s) Oral daily  atorvastatin 80 milliGRAM(s) Oral at bedtime  carvedilol 25 milliGRAM(s) Oral every 12 hours  dextrose 5%. 1000 milliLiter(s) (50 mL/Hr) IV Continuous <Continuous>  dextrose 50% Injectable 12.5 Gram(s) IV Push once  dextrose 50% Injectable 25 Gram(s) IV Push once  dextrose 50% Injectable 25 Gram(s) IV Push once  docusate sodium 100 milliGRAM(s) Oral two times a day  DULoxetine 60 milliGRAM(s) Oral daily  epoetin sara Injectable 15506 Unit(s) SubCutaneous <User Schedule>  ertapenem  IVPB 1000 milliGRAM(s) IV Intermittent every 24 hours  gabapentin 100 milliGRAM(s) Oral three times a day  heparin  Injectable 5000 Unit(s) SubCutaneous every 12 hours  insulin glargine Injectable (LANTUS) 10 Unit(s) SubCutaneous at bedtime  insulin lispro (HumaLOG) corrective regimen sliding scale   SubCutaneous three times a day before meals  insulin lispro (HumaLOG) corrective regimen sliding scale   SubCutaneous at bedtime  insulin lispro Injectable (HumaLOG) 3 Unit(s) SubCutaneous three times a day before meals  isosorbide   mononitrate ER Tablet (IMDUR) 60 milliGRAM(s) Oral daily  magnesium oxide 400 milliGRAM(s) Oral daily  pantoprazole    Tablet 40 milliGRAM(s) Oral before breakfast  polyethylene glycol 3350 17 Gram(s) Oral daily  predniSONE   Tablet 40 milliGRAM(s) Oral daily  senna 2 Tablet(s) Oral at bedtime  torsemide 40 milliGRAM(s) Oral daily      MEDICATIONS  (PRN):  acetaminophen   Tablet .. 650 milliGRAM(s) Oral every 6 hours PRN Mild Pain (1 - 3)  dextrose 40% Gel 15 Gram(s) Oral once PRN Blood Glucose LESS THAN 70 milliGRAM(s)/deciliter  glucagon  Injectable 1 milliGRAM(s) IntraMuscular once PRN Glucose LESS THAN 70 milligrams/deciliter  glycopyrrolate Injectable 0.1 milliGRAM(s) IV Push every 8 hours PRN secreations  guaiFENesin    Syrup 100 milliGRAM(s) Oral every 6 hours PRN Cough  midodrine 2.5 milliGRAM(s) Oral three times a day PRN systolic < 110  ondansetron Injectable 4 milliGRAM(s) IV Push every 6 hours PRN Nausea and/or Vomiting      Allergies    Accupril (Other)    Intolerances        PAST MEDICAL & SURGICAL HISTORY:  Herniated disc, cervical  PAD (peripheral artery disease)  Legally blind  History of peripheral vascular disease  History of retinal detachment  History of CEA (carotid endarterectomy): Right  Hyperlipidemia  Glaucoma  Hypertension  Diabetes  S/P CABG x 1  After cataract  Uveitic glaucoma of both eyes  Detached retina  Atherosclerosis of right carotid artery   delivery delivered      SOCIAL HISTORY  Smoking History: Nonsmoker      REVIEW OF SYSTEMS:    CONSTITUTIONAL:  No distress    HEENT:  Eyes:  No diplopia or blurred vision. ENT:  No earache, sore throat or runny nose.    CARDIOVASCULAR:  No pressure, squeezing, tightness, heaviness or aching about the chest; no palpitations.    RESPIRATORY:  No cough, shortness of breath, PND or orthopnea. Mild SOBOE    GASTROINTESTINAL:  No nausea, vomiting or diarrhea.    GENITOURINARY:  No dysuria, frequency or urgency.    MUSCULOSKELETAL:  No joint pain    SKIN:  No new lesions.    NEUROLOGIC:  No paresthesias, fasciculations, seizures or weakness.    PSYCHIATRIC:  No disorder of thought or mood.    ENDOCRINE:  No heat or cold intolerance, polyuria or polydipsia.    HEMATOLOGICAL:  No easy bruising or bleeding.     Vital Signs Last 24 Hrs  T(C): 37.1 (08 Mar 2019 10:00), Max: 37.2 (08 Mar 2019 00:09)  T(F): 98.7 (08 Mar 2019 10:00), Max: 98.9 (08 Mar 2019 00:09)  HR: 77 (08 Mar 2019 13:55) (64 - 97)  BP: 117/65 (08 Mar 2019 10:00) (96/56 - 143/65)  BP(mean): --  RR: 17 (08 Mar 2019 10:00) (14 - 18)  SpO2: 92% (08 Mar 2019 13:55) (67% - 97%)    PHYSICAL EXAMINATION:    GENERAL: The patient is awake and alert in no apparent distress.     HEENT: Head is normocephalic and atraumatic. Extraocular muscles are intact. Mucous membranes are moist.    NECK: Supple.    LUNGS: Clear to auscultation without wheezing, rales or rhonchi; respirations unlabored; decreased bases bilaterally; decreased significantly RLL    HEART: Regular rate and rhythm without murmur.    ABDOMEN: Soft, nontender, and nondistended.      EXTREMITIES: Without any cyanosis, clubbing, rash, lesions; NO edema.    NEUROLOGIC: Grossly intact.    SKIN: No ulceration or induration present.      LABS:                        9.5    8.6   )-----------( 145      ( 08 Mar 2019 05:50 )             27.9     03    142  |  94<L>  |  82.0<H>  ----------------------------<  271<H>  3.8   |  38.0<H>  |  1.51<H>    Ca    8.6      08 Mar 2019 05:50    TPro  5.7<L>  /  Alb  2.8<L>  /  TBili  1.1  /  DBili  x   /  AST  13  /  ALT  14  /  AlkPhos  156<H>  03-08        ABG - ( 08 Mar 2019 09:45 )  pH, Arterial: 7.47  pH, Blood: x     /  pCO2: 61    /  pO2: 59    / HCO3: 42    / Base Excess: 17.7  /  SaO2: 92                              MICROBIOLOGY:    RADIOLOGY & ADDITIONAL STUDIES:

## 2019-03-08 NOTE — PROGRESS NOTE ADULT - ASSESSMENT
Patient with severe pulmonary hypertension (secondary) with persistent effusion despite drainage tube based on CXR evaluation.  No pulmonary edema.  Effusion will be persistent related to PAH.  Not clear if chest tube is adequately positioned at this point to drain completely.  Not on suction at this time.    OHS/KYREE on nocturnal BIPAP>reportedly compliant  No good evidence of LV dysfunction as cause for current process      Plan:  1.Followup CT of chest noncontrast  2.Continue nocturnal BIPAP  3.Diuretics per cardiology

## 2019-03-08 NOTE — PROGRESS NOTE ADULT - SUBJECTIVE AND OBJECTIVE BOX
CC:  follow up GOC  INTERVAL HPI/OVERNIGHT EVENTS:    PRESENT SYMPTOMS: SOURCE:  Patient/Family/Team    PAIN SCALE:  0 = none  1 = mild   2 = moderate  3 = severe    Pain: denies    Dyspnea:  [ ] YES [x ] NO  Anxiety:  [ ] YES [ x] NO  Fatigue: [x ] YES [ ] NO  Nausea: [ ] YES [ x] NO  Loss of Appetite: [ ] YES [ ]x NO  Other symptoms: __________    MEDICATIONS  (STANDING):  ALBUTerol    0.083% 10 milliGRAM(s) Nebulizer once  ALBUTerol/ipratropium for Nebulization 3 milliLiter(s) Nebulizer every 6 hours  amLODIPine   Tablet 10 milliGRAM(s) Oral daily  aspirin enteric coated 81 milliGRAM(s) Oral daily  atorvastatin 80 milliGRAM(s) Oral at bedtime  carvedilol 25 milliGRAM(s) Oral every 12 hours  dextrose 5%. 1000 milliLiter(s) (50 mL/Hr) IV Continuous <Continuous>  dextrose 50% Injectable 12.5 Gram(s) IV Push once  dextrose 50% Injectable 25 Gram(s) IV Push once  dextrose 50% Injectable 25 Gram(s) IV Push once  docusate sodium 100 milliGRAM(s) Oral two times a day  DULoxetine 60 milliGRAM(s) Oral daily  epoetin sara Injectable 32826 Unit(s) SubCutaneous <User Schedule>  ertapenem  IVPB 1000 milliGRAM(s) IV Intermittent every 24 hours  gabapentin 100 milliGRAM(s) Oral three times a day  heparin  Injectable 5000 Unit(s) SubCutaneous every 12 hours  insulin glargine Injectable (LANTUS) 10 Unit(s) SubCutaneous at bedtime  insulin lispro (HumaLOG) corrective regimen sliding scale   SubCutaneous three times a day before meals  insulin lispro (HumaLOG) corrective regimen sliding scale   SubCutaneous at bedtime  insulin lispro Injectable (HumaLOG) 3 Unit(s) SubCutaneous three times a day before meals  isosorbide   mononitrate ER Tablet (IMDUR) 60 milliGRAM(s) Oral daily  magnesium oxide 400 milliGRAM(s) Oral daily  pantoprazole    Tablet 40 milliGRAM(s) Oral before breakfast  polyethylene glycol 3350 17 Gram(s) Oral daily  predniSONE   Tablet 40 milliGRAM(s) Oral daily  senna 2 Tablet(s) Oral at bedtime  torsemide 40 milliGRAM(s) Oral daily    MEDICATIONS  (PRN):  acetaminophen   Tablet .. 650 milliGRAM(s) Oral every 6 hours PRN Mild Pain (1 - 3)  dextrose 40% Gel 15 Gram(s) Oral once PRN Blood Glucose LESS THAN 70 milliGRAM(s)/deciliter  glucagon  Injectable 1 milliGRAM(s) IntraMuscular once PRN Glucose LESS THAN 70 milligrams/deciliter  glycopyrrolate Injectable 0.1 milliGRAM(s) IV Push every 8 hours PRN secreations  guaiFENesin    Syrup 100 milliGRAM(s) Oral every 6 hours PRN Cough  midodrine 2.5 milliGRAM(s) Oral three times a day PRN systolic < 110  ondansetron Injectable 4 milliGRAM(s) IV Push every 6 hours PRN Nausea and/or Vomiting      Allergies    Accupril (Other)    Intolerances      Karnofsky Performance Score/Palliative Performance Status Version 2: 30 %    Vital Signs Last 24 Hrs  T(C): 37.1 (08 Mar 2019 10:00), Max: 37.2 (08 Mar 2019 00:09)  T(F): 98.7 (08 Mar 2019 10:00), Max: 98.9 (08 Mar 2019 00:09)  HR: 77 (08 Mar 2019 13:55) (64 - 97)  BP: 117/65 (08 Mar 2019 10:00) (96/56 - 143/65)  BP(mean): --  RR: 17 (08 Mar 2019 10:00) (14 - 18)  SpO2: 92% (08 Mar 2019 13:55) (67% - 97%)    PHYSICAL EXAM:    General: Obese, alert NAD    HEENT: [x ] normal  [ ] dry mouth  [ ] ET tube/trach    Lungs: [ x] comfortable + CT    CV: [ x] normal  [ ] tachycardia    GI: [x ] normal  [ ] distended  [ ] tender  [ ] no BS               [ ] PEG/NG/OG tube    : [x ] normal  [ ] incontinent  [ ] oliguria/anuria  [ ] fontenot    MSK: [ ] normal  [x ] weakness  [ ] edema             [ ] ambulatory  [x ]chairbound  Skin: [ ] normal  [ ] pressure ulcers- Stage_____  [x ] no rash    LABS:                        9.5    8.6   )-----------( 145      ( 08 Mar 2019 05:50 )             27.9     03-08    142  |  94<L>  |  82.0<H>  ----------------------------<  271<H>  3.8   |  38.0<H>  |  1.51<H>    Ca    8.6      08 Mar 2019 05:50    TPro  5.7<L>  /  Alb  2.8<L>  /  TBili  1.1  /  DBili  x   /  AST  13  /  ALT  14  /  AlkPhos  156<H>  03-08        I&O's Summary    07 Mar 2019 07:01  -  08 Mar 2019 07:00  --------------------------------------------------------  IN: 890 mL / OUT: 1620 mL / NET: -730 mL    08 Mar 2019 07:01  -  08 Mar 2019 15:28  --------------------------------------------------------  IN: 360 mL / OUT: 900 mL / NET: -540 mL        RADIOLOGY & ADDITIONAL STUDIES:    < from: Xray Chest 1 View- PORTABLE-Urgent (03.08.19 @ 09:57) >   EXAM:  XR CHEST PORTABLE URGENT 1V                          PROCEDURE DATE:  03/08/2019          INTERPRETATION:  XR CHEST PORTABLE URGENT 1V    Single AP view    HISTORY:  rapid response    Comparison:  3/8/2019 at 3:52 AM    Right-sided pigtail catheter again seen.    Prominence of the pulmonary vasculature, unchanged, likely pulmonary   edema.    Right pleural effusion. Previously seen left pleural effusion not seen on   the current study, possibly positional.    The heart is enlarged.    Status post sternotomy.    IMPRESSION:     Pulmonary edema, unchanged since 3:52 AM.    Right pleural effusion, also unchanged.    < end of copied text >    Thank you for the opportunity to assist with the care of this patient.   [facility] [phone].

## 2019-03-08 NOTE — PROGRESS NOTE ADULT - SUBJECTIVE AND OBJECTIVE BOX
Subjective: "I'm ok." Patient in bed s/p RRT.    Vital Signs:  Vital Signs Last 24 Hrs  T(C): 37.1 (03-08-19 @ 10:00), Max: 37.2 (03-08-19 @ 00:09)  T(F): 98.7 (03-08-19 @ 10:00), Max: 98.9 (03-08-19 @ 00:09)  HR: 68 (03-08-19 @ 10:00) (64 - 97)  BP: 117/65 (03-08-19 @ 10:00) (96/56 - 143/65)  RR: 17 (03-08-19 @ 10:00) (14 - 18)  SpO2: 67% (03-08-19 @ 09:35) (67% - 97%) on (O2)    Relevant labs, radiology and Medications reviewed    Pertinent Physical Exam  General: Pleasantly confused, flat affect  Cardiac: S1S2, no murmurs  Pulm: CTA /bl, no wheezing or rales  Abdomen: Soft, NT, ND  Peripheral: no peripheral edema    03-07 @ 07:01  -  03-08 @ 07:00  --------------------------------------------------------  IN:    Oral Fluid: 240 mL    Packed Red Blood Cells: 650 mL  Total IN: 890 mL    OUT:    Chest Tube: 370 mL    Indwelling Catheter - Urethral: 1250 mL  Total OUT: 1620 mL    Total NET: -730 mL

## 2019-03-09 LAB
ANION GAP SERPL CALC-SCNC: 9 MMOL/L — SIGNIFICANT CHANGE UP (ref 5–17)
BUN SERPL-MCNC: 84 MG/DL — HIGH (ref 8–20)
CALCIUM SERPL-MCNC: 8.6 MG/DL — SIGNIFICANT CHANGE UP (ref 8.6–10.2)
CHLORIDE SERPL-SCNC: 95 MMOL/L — LOW (ref 98–107)
CO2 SERPL-SCNC: 41 MMOL/L — HIGH (ref 22–29)
CREAT SERPL-MCNC: 1.47 MG/DL — HIGH (ref 0.5–1.3)
GLUCOSE BLDC GLUCOMTR-MCNC: 204 MG/DL — HIGH (ref 70–99)
GLUCOSE BLDC GLUCOMTR-MCNC: 257 MG/DL — HIGH (ref 70–99)
GLUCOSE BLDC GLUCOMTR-MCNC: 263 MG/DL — HIGH (ref 70–99)
GLUCOSE BLDC GLUCOMTR-MCNC: 297 MG/DL — HIGH (ref 70–99)
GLUCOSE SERPL-MCNC: 212 MG/DL — HIGH (ref 70–115)
HCT VFR BLD CALC: 30.7 % — LOW (ref 37–47)
HGB BLD-MCNC: 10.2 G/DL — LOW (ref 12–16)
MCHC RBC-ENTMCNC: 29.4 PG — SIGNIFICANT CHANGE UP (ref 27–31)
MCHC RBC-ENTMCNC: 33.2 G/DL — SIGNIFICANT CHANGE UP (ref 32–36)
MCV RBC AUTO: 88.5 FL — SIGNIFICANT CHANGE UP (ref 81–99)
PLATELET # BLD AUTO: 141 K/UL — LOW (ref 150–400)
POTASSIUM SERPL-MCNC: 3.8 MMOL/L — SIGNIFICANT CHANGE UP (ref 3.5–5.3)
POTASSIUM SERPL-SCNC: 3.8 MMOL/L — SIGNIFICANT CHANGE UP (ref 3.5–5.3)
RBC # BLD: 3.47 M/UL — LOW (ref 4.4–5.2)
RBC # FLD: 16.2 % — HIGH (ref 11–15.6)
SODIUM SERPL-SCNC: 145 MMOL/L — SIGNIFICANT CHANGE UP (ref 135–145)
WBC # BLD: 8.5 K/UL — SIGNIFICANT CHANGE UP (ref 4.8–10.8)
WBC # FLD AUTO: 8.5 K/UL — SIGNIFICANT CHANGE UP (ref 4.8–10.8)

## 2019-03-09 PROCEDURE — 99232 SBSQ HOSP IP/OBS MODERATE 35: CPT

## 2019-03-09 PROCEDURE — 99231 SBSQ HOSP IP/OBS SF/LOW 25: CPT

## 2019-03-09 RX ORDER — CARVEDILOL PHOSPHATE 80 MG/1
12.5 CAPSULE, EXTENDED RELEASE ORAL EVERY 12 HOURS
Qty: 0 | Refills: 0 | Status: DISCONTINUED | OUTPATIENT
Start: 2019-03-09 | End: 2019-03-10

## 2019-03-09 RX ORDER — AMLODIPINE BESYLATE 2.5 MG/1
5 TABLET ORAL DAILY
Qty: 0 | Refills: 0 | Status: DISCONTINUED | OUTPATIENT
Start: 2019-03-10 | End: 2019-03-26

## 2019-03-09 RX ADMIN — Medication 3 MILLILITER(S): at 08:12

## 2019-03-09 RX ADMIN — Medication 40 MILLIGRAM(S): at 06:03

## 2019-03-09 RX ADMIN — Medication 3 UNIT(S): at 12:11

## 2019-03-09 RX ADMIN — Medication 3 MILLILITER(S): at 13:40

## 2019-03-09 RX ADMIN — SENNA PLUS 2 TABLET(S): 8.6 TABLET ORAL at 22:11

## 2019-03-09 RX ADMIN — ERTAPENEM SODIUM 120 MILLIGRAM(S): 1 INJECTION, POWDER, LYOPHILIZED, FOR SOLUTION INTRAMUSCULAR; INTRAVENOUS at 22:11

## 2019-03-09 RX ADMIN — DULOXETINE HYDROCHLORIDE 60 MILLIGRAM(S): 30 CAPSULE, DELAYED RELEASE ORAL at 12:46

## 2019-03-09 RX ADMIN — Medication 2: at 22:11

## 2019-03-09 RX ADMIN — ISOSORBIDE MONONITRATE 60 MILLIGRAM(S): 60 TABLET, EXTENDED RELEASE ORAL at 12:46

## 2019-03-09 RX ADMIN — CARVEDILOL PHOSPHATE 12.5 MILLIGRAM(S): 80 CAPSULE, EXTENDED RELEASE ORAL at 18:12

## 2019-03-09 RX ADMIN — Medication 81 MILLIGRAM(S): at 12:45

## 2019-03-09 RX ADMIN — Medication 6: at 17:28

## 2019-03-09 RX ADMIN — Medication 100 MILLIGRAM(S): at 06:03

## 2019-03-09 RX ADMIN — HEPARIN SODIUM 5000 UNIT(S): 5000 INJECTION INTRAVENOUS; SUBCUTANEOUS at 18:13

## 2019-03-09 RX ADMIN — PANTOPRAZOLE SODIUM 40 MILLIGRAM(S): 20 TABLET, DELAYED RELEASE ORAL at 06:03

## 2019-03-09 RX ADMIN — Medication 3 UNIT(S): at 08:32

## 2019-03-09 RX ADMIN — AMLODIPINE BESYLATE 10 MILLIGRAM(S): 2.5 TABLET ORAL at 06:03

## 2019-03-09 RX ADMIN — Medication 3 MILLILITER(S): at 20:18

## 2019-03-09 RX ADMIN — GABAPENTIN 100 MILLIGRAM(S): 400 CAPSULE ORAL at 22:11

## 2019-03-09 RX ADMIN — Medication 3 MILLILITER(S): at 03:17

## 2019-03-09 RX ADMIN — GABAPENTIN 100 MILLIGRAM(S): 400 CAPSULE ORAL at 06:02

## 2019-03-09 RX ADMIN — HEPARIN SODIUM 5000 UNIT(S): 5000 INJECTION INTRAVENOUS; SUBCUTANEOUS at 06:02

## 2019-03-09 RX ADMIN — INSULIN GLARGINE 10 UNIT(S): 100 INJECTION, SOLUTION SUBCUTANEOUS at 22:10

## 2019-03-09 RX ADMIN — MAGNESIUM OXIDE 400 MG ORAL TABLET 400 MILLIGRAM(S): 241.3 TABLET ORAL at 12:46

## 2019-03-09 RX ADMIN — ATORVASTATIN CALCIUM 80 MILLIGRAM(S): 80 TABLET, FILM COATED ORAL at 22:11

## 2019-03-09 RX ADMIN — Medication 3 UNIT(S): at 17:28

## 2019-03-09 RX ADMIN — GABAPENTIN 100 MILLIGRAM(S): 400 CAPSULE ORAL at 15:22

## 2019-03-09 RX ADMIN — Medication 4: at 08:31

## 2019-03-09 RX ADMIN — Medication 6: at 12:10

## 2019-03-09 NOTE — PROGRESS NOTE ADULT - ASSESSMENT
62 yo presenting to ED w/ SOB 2/2 diastolic HF exacerbation and noted to have KEITH and hyperkalemia, PMH CAD s/p CABG and PCI, PAD s/p baloon angioplasty, DM2, HTN, HLD, hx of cardiac arrest, recent GI bleed last month, Diastolic CHF, legally blind, pleural effusion requiring thoracentesis in Dec 2018, admitted for SOB, noted to have pleural effusion, started on bumex, s/p RRT noted on 3/1 for hypotension & lethargy. Pt became hypotensive after Bumex with albumin was given.  CXR with worsening plum edema b/l. BiPAP placed. ICU consult called for vasopressor support during IV diuresis. Midodrine ordered. She became increasingly lethargic with ABG showing resp acidosis with hypoxia. Pulmonary consult appreciated- started on IV steroids, feeling better, started on steroid taper.     A/p    >Acute on Chronic Hypercapnic Respiratory Failure due to OHS/KYREE   Pulm recommend chronic NIV for chronic hypercarbic respiratory failure to improve symptoms and to decrease hospitalizations  c/w Bipap qhs  Eventual SINGH with NIV  c/w steroid taper   - Avoid Narcotics for pain    >UTI - appreciate ID input - c/w IN invanz - end date 03/10    >Dysphagia - had a RR due to aspiration, speech eval appreciated - recommend regular diet with thin liquid    >Chest pain with hx of CAD & CABG/PCI-  PRBC to improve oxygen to myocardium- goal around 10 . c/w Imdur, BB, statin, ASA. Plavix on hold since last admission due to GI Bleed.      > Lower GI Bleed likely diverticular-   - s/p 1 unit of PRBC  - s/p Colonoscopy -~ Poor prep throughout the colon. Diverticulosis seen. Unable to visualize colon sufficiently for poor prep  No further GI w/u recommended, ASA resumed as per GI, Plavix yet on hold  Advanced diet     >Hyperkalemia  - Hold Losartan    > Acute on Chronic Diastolic Heart Failure with severe PHTN  & right ventricular failure from OHS/ KYREE- now with pulm edema & pleural effusions  - c/w Torsemide  c/w coreg & Imdur  - Hold Losartan due to hyperkalemia  - ECHO shows EF of > 75%. Moderate to Severe TR, mod-severe PHTN    >Pleural effusion  Chest tube on right draining. Maintain chest tube per CTS    > Acute on chronic kidney injury - improving  appreciate renal input - - cont Torsemide ==> will need to tolerate azotemia  ct hold losartan   f/u BMP     > CAD stents in 2014 / PAD / HLD / HTN  - Continue, Imdur, Lipitor, Coreg and Norvasc   - Plavix on hold since last admission due to GI Bleed. ASA resumed as per GI, plavix yet on hold    > DM 2  - HbA1c 5.6  - Lantus at 10 units at bedtime plus Humalog 3U   - c/w moderate sliding scale  - Steroids taper    > Anemia  - Multifactorial (chronic, now complicated with acute bleeding)  - s/p Venofer   - Continue Procrit  + stool for occult blood (will need GI work-up as outpt)  - Monitor CBC      > Transaminitis- improved  - Likely secondary to liver congestion    > History of Depression  - Continue Duloxetine 60 mg    >B/L UE swelling  - Arm elevation  - Warm compresses   - No DVT       DVT Prophylaxis -- heparin sc resumed, 64 yo presenting to ED w/ SOB 2/2 diastolic HF exacerbation and noted to have KEITH and hyperkalemia, PMH CAD s/p CABG and PCI, PAD s/p baloon angioplasty, DM2, HTN, HLD, hx of cardiac arrest, recent GI bleed last month, Diastolic CHF, legally blind, pleural effusion requiring thoracentesis in Dec 2018, admitted for SOB, noted to have pleural effusion, started on bumex, s/p RRT noted on 3/1 for hypotension & lethargy. Pt became hypotensive after Bumex with albumin was given.  CXR with worsening plum edema b/l. BiPAP placed. ICU consult called for vasopressor support during IV diuresis. Midodrine ordered. She became increasingly lethargic with ABG showing resp acidosis with hypoxia. Pulmonary consult appreciated- started on IV steroids, feeling better, started on steroid taper.     A/p    >Acute on Chronic Hypercapnic Respiratory Failure due to OHS/KYREE   Pulm recommend chronic NIV for chronic hypercarbic respiratory failure to improve symptoms and to decrease hospitalizations  c/w Bipap qhs  Eventual SINGH with NIV  c/w steroid taper   - Avoid Narcotics for pain    >UTI - appreciate ID input - c/w IN invanz - end date 03/10    >Dysphagia - had a RR due to aspiration, speech eval appreciated - recommend regular diet with thin liquid    >Chest pain with hx of CAD & CABG/PCI-  PRBC to improve oxygen to myocardium- goal around 10 . c/w Imdur, BB, statin, ASA. Plavix on hold since last admission due to GI Bleed.      > Lower GI Bleed likely diverticular-   - s/p 1 unit of PRBC  - s/p Colonoscopy -~ Poor prep throughout the colon. Diverticulosis seen. Unable to visualize colon sufficiently for poor prep  No further GI w/u recommended, ASA resumed as per GI, Plavix yet on hold  Advanced diet     >Hyperkalemia  - Hold Losartan    > Acute on Chronic Diastolic Heart Failure with severe PHTN  & right ventricular failure from OHS/ KYREE- now with pulm edema & pleural effusions  - c/w Torsemide  c/w coreg & Imdur  - Hold Losartan due to hyperkalemia  - ECHO shows EF of > 75%. Moderate to Severe TR, mod-severe PHTN    >Pleural effusion  Chest tube on right draining. Maintain chest tube per CTS    > Acute on chronic kidney injury - improving  appreciate renal input - - cont Torsemide ==> will need to tolerate azotemia  ct hold losartan   f/u BMP     > CAD stents in 2014 / PAD / HLD / HTN  - Continue, Imdur, Lipitor, Coreg and Norvasc   - Plavix on hold since last admission due to GI Bleed. ASA resumed as per GI, plavix yet on hold    > DM 2  - HbA1c 5.6  - Lantus at 10 units at bedtime plus Humalog 3U   - c/w moderate sliding scale  - Steroids taper    > Anemia  - Multifactorial (chronic, now complicated with acute bleeding)  - s/p Venofer   - Continue Procrit  + stool for occult blood (will need GI work-up as outpt)  - Monitor CBC      > Transaminitis- improved  - Likely secondary to liver congestion    > History of Depression  - Continue Duloxetine 60 mg    >B/L UE swelling  - Arm elevation  - Warm compresses   - No DVT       DVT Prophylaxis -- heparin sc resumed,     >Goals of care - palliative on board   DNR  prognosis guarded

## 2019-03-09 NOTE — PROGRESS NOTE ADULT - SUBJECTIVE AND OBJECTIVE BOX
Patient seen and examined.  Denies CP, SOB, N/V.  Reports feeling much better than yesterday.      T(C): 36.4 (03-09-19 @ 08:20)  T(F): 97.6 (03-09-19 @ 08:20)  HR: 57 (03-09-19 @ 08:20)  BP: 120/58 (03-09-19 @ 08:20)  BP(mean): --  ABP: --  ABP(mean): --  RR: 14 (03-09-19 @ 08:20)  SpO2: 91% (03-09-19 @ 09:36)  Wt(kg): --  CVP(mm Hg): --  CI: --  PA: --  PA(mean): --  PA(direct): --  SVRI: --      Physical Exam:  Gen: A&Ox2  Pulm:  CTA b/l, no r/r/w  CV:  S1S2, no m/r/g  Abd: +BS, soft, NT, ND  Ext:  +DP b/l, no c/c/e  Incision: dressing c/d/i    I&O's Detail    08 Mar 2019 07:01  -  09 Mar 2019 07:00  --------------------------------------------------------  IN:    Oral Fluid: 600 mL  Total IN: 600 mL    OUT:    Chest Tube: 245 mL    Indwelling Catheter - Urethral: 1650 mL  Total OUT: 1895 mL    Total NET: -1295 mL      09 Mar 2019 06:01  -  09 Mar 2019 11:33  --------------------------------------------------------  IN:    Oral Fluid: 120 mL  Total IN: 120 mL    OUT:  Total OUT: 0 mL    Total NET: 120 mL                              10.2   8.5   )-----------( 141      ( 09 Mar 2019 06:09 )             30.7   03-09    145  |  95<L>  |  84.0<H>  ----------------------------<  212<H>  3.8   |  41.0<H>  |  1.47<H>    Ca    8.6      09 Mar 2019 06:09    TPro  5.7<L>  /  Alb  2.8<L>  /  TBili  1.1  /  DBili  x   /  AST  13  /  ALT  14  /  AlkPhos  156<H>  03-08    ABG - ( 08 Mar 2019 09:45 )  pH: 7.47  /  pCO2: 61    /  pO2: 59    / HCO3: 42    / Base Excess: 17.7  /  SaO2: 92   /  Lactate: x        CAPILLARY BLOOD GLUCOSE      POCT Blood Glucose.: 204 mg/dL (09 Mar 2019 08:15)        Medications:  acetaminophen   Tablet .. 650 milliGRAM(s) Oral every 6 hours PRN  ALBUTerol    0.083% 10 milliGRAM(s) Nebulizer once  ALBUTerol/ipratropium for Nebulization 3 milliLiter(s) Nebulizer every 6 hours  aspirin enteric coated 81 milliGRAM(s) Oral daily  atorvastatin 80 milliGRAM(s) Oral at bedtime  carvedilol 12.5 milliGRAM(s) Oral every 12 hours  dextrose 40% Gel 15 Gram(s) Oral once PRN  dextrose 5%. 1000 milliLiter(s) IV Continuous <Continuous>  dextrose 50% Injectable 12.5 Gram(s) IV Push once  dextrose 50% Injectable 25 Gram(s) IV Push once  dextrose 50% Injectable 25 Gram(s) IV Push once  docusate sodium 100 milliGRAM(s) Oral two times a day  DULoxetine 60 milliGRAM(s) Oral daily  epoetin sara Injectable 79452 Unit(s) SubCutaneous <User Schedule>  ertapenem  IVPB 1000 milliGRAM(s) IV Intermittent every 24 hours  gabapentin 100 milliGRAM(s) Oral three times a day  glucagon  Injectable 1 milliGRAM(s) IntraMuscular once PRN  glycopyrrolate Injectable 0.1 milliGRAM(s) IV Push every 8 hours PRN  guaiFENesin    Syrup 100 milliGRAM(s) Oral every 6 hours PRN  heparin  Injectable 5000 Unit(s) SubCutaneous every 12 hours  insulin glargine Injectable (LANTUS) 10 Unit(s) SubCutaneous at bedtime  insulin lispro (HumaLOG) corrective regimen sliding scale   SubCutaneous three times a day before meals  insulin lispro (HumaLOG) corrective regimen sliding scale   SubCutaneous at bedtime  insulin lispro Injectable (HumaLOG) 3 Unit(s) SubCutaneous three times a day before meals  isosorbide   mononitrate ER Tablet (IMDUR) 60 milliGRAM(s) Oral daily  magnesium oxide 400 milliGRAM(s) Oral daily  ondansetron Injectable 4 milliGRAM(s) IV Push every 6 hours PRN  pantoprazole    Tablet 40 milliGRAM(s) Oral before breakfast  polyethylene glycol 3350 17 Gram(s) Oral daily  senna 2 Tablet(s) Oral at bedtime  torsemide 40 milliGRAM(s) Oral daily      CXR: R effusion    Assessment:  Pt is a 63y year old Female  s/p Chest tube placement with US guidance  Colonoscopy  Peripheral insertion of midline catheter  Vascular access with ultrasound guidance      Patent currently stable, NAD.    Plan:

## 2019-03-09 NOTE — PROGRESS NOTE ADULT - ASSESSMENT
63F medical admission f/w pleural effusion s/p R pigtail placement in IR, drained 245cc last 24 hrs while on waterseal.  serous drainage in pleurovac.  Patient NAD.  CT scan reviewed CT In minor fissure.  Care per primary team

## 2019-03-09 NOTE — PROGRESS NOTE ADULT - PROBLEM SELECTOR PLAN 1
maintain CT to [place].    No need to reposition based on CT scan findings per Dr. Escudero   repeat Cxr in AM  care per primary team

## 2019-03-09 NOTE — CHART NOTE - NSCHARTNOTEFT_GEN_A_CORE
Source: Patient [ ]  Family [ ]   other [x ] Pt is currently sleeping    Pt with worsening pulmonary edema. BiPAP placed. ICU consult called for vasopressor support during IV diuresis. Pt became increasingly lethargic, +respiratory acidosis with hypoxia. Pt started on IV steroids, feeling better, started on steroid taper.     Current Diet: Diet, Consistent Carbohydrate Renal/No Snacks:   High Fiber  DASH/TLC {Sodium & Cholesteral Restricted}  Supplement Feeding Modality:  Oral  Nepro Cans or Servings Per Day:  1       Frequency:  Three Times a day (03-08-19 @ 12:22)    PO intake: Pt with good po intake per RN flowsheets- 100% consumed at most meals    Current Weight:   (3/8) 228#  (3/5) 246#  (2/19) 245#  (2/16) 210#    % Weight Change - aware wt fluctuations, will continue to monitor    Pertinent Medications: MEDICATIONS  (STANDING):  ALBUTerol    0.083% 10 milliGRAM(s) Nebulizer once  ALBUTerol/ipratropium for Nebulization 3 milliLiter(s) Nebulizer every 6 hours  aspirin enteric coated 81 milliGRAM(s) Oral daily  atorvastatin 80 milliGRAM(s) Oral at bedtime  carvedilol 12.5 milliGRAM(s) Oral every 12 hours  dextrose 5%. 1000 milliLiter(s) (50 mL/Hr) IV Continuous <Continuous>  dextrose 50% Injectable 12.5 Gram(s) IV Push once  dextrose 50% Injectable 25 Gram(s) IV Push once  dextrose 50% Injectable 25 Gram(s) IV Push once  docusate sodium 100 milliGRAM(s) Oral two times a day  DULoxetine 60 milliGRAM(s) Oral daily  epoetin sara Injectable 72357 Unit(s) SubCutaneous <User Schedule>  ertapenem  IVPB 1000 milliGRAM(s) IV Intermittent every 24 hours  gabapentin 100 milliGRAM(s) Oral three times a day  heparin  Injectable 5000 Unit(s) SubCutaneous every 12 hours  insulin glargine Injectable (LANTUS) 10 Unit(s) SubCutaneous at bedtime  insulin lispro (HumaLOG) corrective regimen sliding scale   SubCutaneous three times a day before meals  insulin lispro (HumaLOG) corrective regimen sliding scale   SubCutaneous at bedtime  insulin lispro Injectable (HumaLOG) 3 Unit(s) SubCutaneous three times a day before meals  isosorbide   mononitrate ER Tablet (IMDUR) 60 milliGRAM(s) Oral daily  magnesium oxide 400 milliGRAM(s) Oral daily  pantoprazole    Tablet 40 milliGRAM(s) Oral before breakfast  polyethylene glycol 3350 17 Gram(s) Oral daily  senna 2 Tablet(s) Oral at bedtime  torsemide 40 milliGRAM(s) Oral daily    MEDICATIONS  (PRN):  acetaminophen   Tablet .. 650 milliGRAM(s) Oral every 6 hours PRN Mild Pain (1 - 3)  dextrose 40% Gel 15 Gram(s) Oral once PRN Blood Glucose LESS THAN 70 milliGRAM(s)/deciliter  glucagon  Injectable 1 milliGRAM(s) IntraMuscular once PRN Glucose LESS THAN 70 milligrams/deciliter  glycopyrrolate Injectable 0.1 milliGRAM(s) IV Push every 8 hours PRN secreations  guaiFENesin    Syrup 100 milliGRAM(s) Oral every 6 hours PRN Cough  ondansetron Injectable 4 milliGRAM(s) IV Push every 6 hours PRN Nausea and/or Vomiting    Pertinent Labs: CBC Full  -  ( 09 Mar 2019 06:09 )  WBC Count : 8.5 K/uL  Hemoglobin : 10.2 g/dL  Hematocrit : 30.7 %  Platelet Count - Automated : 141 K/uL  Mean Cell Volume : 88.5 fl  Mean Cell Hemoglobin : 29.4 pg  Mean Cell Hemoglobin Concentration : 33.2 g/dL  03-09 Na145 mmol/L Glu 212 mg/dL<H> K+ 3.8 mmol/L Cr  1.47 mg/dL<H> BUN 84.0 mg/dL<H> Phos n/a   Alb n/a   PAB n/a       Skin: right heel unstageable.  Aware generalized 1+ trace edema    Nutrition focused physical exam NOT conducted - found signs of malnutrition [ ]absent [ ]present  -- Pt sleeping  Subcutaneous fat loss: [ ] Orbital fat pads region, [ ]Buccal fat region, [ ]Triceps region,  [ ]Ribs region    Muscle wasting: [ ]Temples region, [ ]Clavicle region, [ ]Shoulder region, [ ]Scapula region, [ ]Interosseous region,  [ ]thigh region, [ ]Calf region    Estimated Needs:   [x ] no change since previous assessment  [ ] recalculated:     Current Nutrition Diagnosis: Pt remains at nutrition risk secondary to moderate (acute) protein-calorie malnutrition related to inadequate protein-energy intake in the setting of poor appetite and previous medical intervention requiring intermittent NPO/clear liquid diet as evidenced by generalized trace edema and pt meeting <75% of protein-energy needs >7 days.  Pt with much improved po intake at meals, RD to remain available.    Recommendations:   Continue with Nepro TID  RX: Neprovite and Vit C 500mg daily  Strict glu control while receiving corticosteroids  Monitor daily wt for accuracy    Monitoring and Evaluation:   [x ] PO intake [x ] Tolerance to diet prescription [X] Weights  [X] Follow up per protocol [X] Labs:

## 2019-03-09 NOTE — PROGRESS NOTE ADULT - SUBJECTIVE AND OBJECTIVE BOX
Internal Medicine Hospitalist - Dr. Lobo ROCHA    [id number]    63y      Female    Patient is a 63y old  Female who presents with a chief complaint of SOB (08 Mar 2019 16:54)    INTERVAL HPI/ OVERNIGHT EVENTS: Patient is seen and examined, denied chest pain, SOB, abd. pain, nausea and vomiting    REVIEW OF SYSTEMS:    Denied fever, chills, abd. pain, nausea, vomiting, chest pain, SOB, headache, dizziness    PHYSICAL EXAM:    Vital Signs Last 24 Hrs  T(C): 36.4 (09 Mar 2019 08:20), Max: 37.1 (08 Mar 2019 10:00)  T(F): 97.6 (09 Mar 2019 08:20), Max: 98.7 (08 Mar 2019 10:00)  HR: 57 (09 Mar 2019 08:20) (56 - 77)  BP: 120/58 (09 Mar 2019 08:20) (96/56 - 142/66)  BP(mean): --  RR: 14 (09 Mar 2019 08:20) (14 - 18)  SpO2: 93% (09 Mar 2019 08:20) (67% - 98%)    GENERAL: NAD  CHEST/LUNG: decrease breath sounds over bases  HEART: S1S2+ audible  ABDOMEN: Soft, Nontender, Nondistended; Bowel sounds present  EXTREMITIES:  no edema  CNS: AAO X 3  Psychiatry: normal mood    LABS:                        10.2   8.5   )-----------( 141      ( 09 Mar 2019 06:09 )             30.7     03-09    145  |  95<L>  |  84.0<H>  ----------------------------<  212<H>  3.8   |  41.0<H>  |  1.47<H>    Ca    8.6      09 Mar 2019 06:09    TPro  5.7<L>  /  Alb  2.8<L>  /  TBili  1.1  /  DBili  x   /  AST  13  /  ALT  14  /  AlkPhos  156<H>  03-08            MEDICATIONS  (STANDING):  ALBUTerol    0.083% 10 milliGRAM(s) Nebulizer once  ALBUTerol/ipratropium for Nebulization 3 milliLiter(s) Nebulizer every 6 hours  aspirin enteric coated 81 milliGRAM(s) Oral daily  atorvastatin 80 milliGRAM(s) Oral at bedtime  carvedilol 12.5 milliGRAM(s) Oral every 12 hours  dextrose 5%. 1000 milliLiter(s) (50 mL/Hr) IV Continuous <Continuous>  dextrose 50% Injectable 12.5 Gram(s) IV Push once  dextrose 50% Injectable 25 Gram(s) IV Push once  dextrose 50% Injectable 25 Gram(s) IV Push once  docusate sodium 100 milliGRAM(s) Oral two times a day  DULoxetine 60 milliGRAM(s) Oral daily  epoetin sara Injectable 68942 Unit(s) SubCutaneous <User Schedule>  ertapenem  IVPB 1000 milliGRAM(s) IV Intermittent every 24 hours  gabapentin 100 milliGRAM(s) Oral three times a day  heparin  Injectable 5000 Unit(s) SubCutaneous every 12 hours  insulin glargine Injectable (LANTUS) 10 Unit(s) SubCutaneous at bedtime  insulin lispro (HumaLOG) corrective regimen sliding scale   SubCutaneous three times a day before meals  insulin lispro (HumaLOG) corrective regimen sliding scale   SubCutaneous at bedtime  insulin lispro Injectable (HumaLOG) 3 Unit(s) SubCutaneous three times a day before meals  isosorbide   mononitrate ER Tablet (IMDUR) 60 milliGRAM(s) Oral daily  magnesium oxide 400 milliGRAM(s) Oral daily  pantoprazole    Tablet 40 milliGRAM(s) Oral before breakfast  polyethylene glycol 3350 17 Gram(s) Oral daily  senna 2 Tablet(s) Oral at bedtime  torsemide 40 milliGRAM(s) Oral daily    MEDICATIONS  (PRN):  acetaminophen   Tablet .. 650 milliGRAM(s) Oral every 6 hours PRN Mild Pain (1 - 3)  dextrose 40% Gel 15 Gram(s) Oral once PRN Blood Glucose LESS THAN 70 milliGRAM(s)/deciliter  glucagon  Injectable 1 milliGRAM(s) IntraMuscular once PRN Glucose LESS THAN 70 milligrams/deciliter  glycopyrrolate Injectable 0.1 milliGRAM(s) IV Push every 8 hours PRN secreations  guaiFENesin    Syrup 100 milliGRAM(s) Oral every 6 hours PRN Cough  ondansetron Injectable 4 milliGRAM(s) IV Push every 6 hours PRN Nausea and/or Vomiting      RADIOLOGY & ADDITIONAL TEST

## 2019-03-09 NOTE — PROGRESS NOTE ADULT - ASSESSMENT
Patient with chronic effusion secondary to pulmonary hypertension.  Complicated by renal failure and persistent marked uremia.  Tube position at this time not ideal but ultimately patient will likely benefit from a pleurex as the process will be ongoing.    Plan:  Call out to thoracic re: current chest tube and thoughts re: pleurex.    Current tube will still drain though intermittently and will be positional.

## 2019-03-09 NOTE — PROGRESS NOTE ADULT - SUBJECTIVE AND OBJECTIVE BOX
PULMONARY PROGRESS NOTE      RANJIT ROCHA-[id number]    Patient is a 63y old  Female who presents with a chief complaint of SOB (09 Mar 2019 09:12)      INTERVAL HPI/OVERNIGHT EVENTS:  No new issues  Had CT as ordered and reviewed>tube is in fissure  Effusion is small to molderate    MEDICATIONS  (STANDING):  ALBUTerol    0.083% 10 milliGRAM(s) Nebulizer once  ALBUTerol/ipratropium for Nebulization 3 milliLiter(s) Nebulizer every 6 hours  aspirin enteric coated 81 milliGRAM(s) Oral daily  atorvastatin 80 milliGRAM(s) Oral at bedtime  carvedilol 12.5 milliGRAM(s) Oral every 12 hours  dextrose 5%. 1000 milliLiter(s) (50 mL/Hr) IV Continuous <Continuous>  dextrose 50% Injectable 12.5 Gram(s) IV Push once  dextrose 50% Injectable 25 Gram(s) IV Push once  dextrose 50% Injectable 25 Gram(s) IV Push once  docusate sodium 100 milliGRAM(s) Oral two times a day  DULoxetine 60 milliGRAM(s) Oral daily  epoetin sara Injectable 25141 Unit(s) SubCutaneous <User Schedule>  ertapenem  IVPB 1000 milliGRAM(s) IV Intermittent every 24 hours  gabapentin 100 milliGRAM(s) Oral three times a day  heparin  Injectable 5000 Unit(s) SubCutaneous every 12 hours  insulin glargine Injectable (LANTUS) 10 Unit(s) SubCutaneous at bedtime  insulin lispro (HumaLOG) corrective regimen sliding scale   SubCutaneous three times a day before meals  insulin lispro (HumaLOG) corrective regimen sliding scale   SubCutaneous at bedtime  insulin lispro Injectable (HumaLOG) 3 Unit(s) SubCutaneous three times a day before meals  isosorbide   mononitrate ER Tablet (IMDUR) 60 milliGRAM(s) Oral daily  magnesium oxide 400 milliGRAM(s) Oral daily  pantoprazole    Tablet 40 milliGRAM(s) Oral before breakfast  polyethylene glycol 3350 17 Gram(s) Oral daily  senna 2 Tablet(s) Oral at bedtime  torsemide 40 milliGRAM(s) Oral daily      MEDICATIONS  (PRN):  acetaminophen   Tablet .. 650 milliGRAM(s) Oral every 6 hours PRN Mild Pain (1 - 3)  dextrose 40% Gel 15 Gram(s) Oral once PRN Blood Glucose LESS THAN 70 milliGRAM(s)/deciliter  glucagon  Injectable 1 milliGRAM(s) IntraMuscular once PRN Glucose LESS THAN 70 milligrams/deciliter  glycopyrrolate Injectable 0.1 milliGRAM(s) IV Push every 8 hours PRN secreations  guaiFENesin    Syrup 100 milliGRAM(s) Oral every 6 hours PRN Cough  ondansetron Injectable 4 milliGRAM(s) IV Push every 6 hours PRN Nausea and/or Vomiting      Allergies    Accupril (Other)    Intolerances        PAST MEDICAL & SURGICAL HISTORY:  Herniated disc, cervical  PAD (peripheral artery disease)  Legally blind  History of peripheral vascular disease  History of retinal detachment  History of CEA (carotid endarterectomy): Right  Hyperlipidemia  Glaucoma  Hypertension  Diabetes  S/P CABG x 1  After cataract  Uveitic glaucoma of both eyes  Detached retina  Atherosclerosis of right carotid artery   delivery delivered      SOCIAL HISTORY  Smoking History:       REVIEW OF SYSTEMS:    CONSTITUTIONAL:  No distress    HEENT:  Eyes:  No diplopia or blurred vision. ENT:  No earache, sore throat or runny nose.    CARDIOVASCULAR:  No pressure, squeezing, tightness, heaviness or aching about the chest; no palpitations.    RESPIRATORY:  No cough, shortness of breath, PND or orthopnea. Mild SOBOE    GASTROINTESTINAL:  No nausea, vomiting or diarrhea.    GENITOURINARY:  No dysuria, frequency or urgency.    MUSCULOSKELETAL:  No joint pain    SKIN:  No new lesions.    NEUROLOGIC:  No paresthesias, fasciculations, seizures or weakness.    PSYCHIATRIC:  No disorder of thought or mood.    ENDOCRINE:  No heat or cold intolerance, polyuria or polydipsia.    HEMATOLOGICAL:  No easy bruising or bleeding.     Vital Signs Last 24 Hrs  T(C): 36.3 (09 Mar 2019 11:57), Max: 36.7 (08 Mar 2019 15:46)  T(F): 97.4 (09 Mar 2019 11:57), Max: 98 (08 Mar 2019 15:46)  HR: 60 (09 Mar 2019 13:41) (56 - 77)  BP: 132/69 (09 Mar 2019 11:57) (110/58 - 142/66)  BP(mean): --  RR: 16 (09 Mar 2019 11:57) (14 - 18)  SpO2: 91% (09 Mar 2019 13:41) (90% - 98%)    PHYSICAL EXAMINATION:    GENERAL: The patient is awake and alert in no apparent distress.     HEENT: Head is normocephalic and atraumatic. Extraocular muscles are intact. Mucous membranes are moist.    NECK: Supple.    LUNGS: Decreased right base 1/4 up    HEART: Regular rate and rhythm without murmur.    ABDOMEN: Soft, nontender, and nondistended.      EXTREMITIES: Without any cyanosis, clubbing, rash, lesions or edema.    NEUROLOGIC: Grossly intact.    SKIN: No ulceration or induration present.      LABS:                        10.2   8.5   )-----------( 141      ( 09 Mar 2019 06:09 )             30.7     03    145  |  95<L>  |  84.0<H>  ----------------------------<  212<H>  3.8   |  41.0<H>  |  1.47<H>    Ca    8.6      09 Mar 2019 06:09    TPro  5.7<L>  /  Alb  2.8<L>  /  TBili  1.1  /  DBili  x   /  AST  13  /  ALT  14  /  AlkPhos  156<H>  03-08        ABG - ( 08 Mar 2019 09:45 )  pH, Arterial: 7.47  pH, Blood: x     /  pCO2: 61    /  pO2: 59    / HCO3: 42    / Base Excess: 17.7  /  SaO2: 92                              MICROBIOLOGY:    RADIOLOGY & ADDITIONAL STUDIES:

## 2019-03-10 LAB
ANION GAP SERPL CALC-SCNC: 12 MMOL/L — SIGNIFICANT CHANGE UP (ref 5–17)
BUN SERPL-MCNC: 86 MG/DL — HIGH (ref 8–20)
CALCIUM SERPL-MCNC: 8.4 MG/DL — LOW (ref 8.6–10.2)
CHLORIDE SERPL-SCNC: 96 MMOL/L — LOW (ref 98–107)
CO2 SERPL-SCNC: 38 MMOL/L — HIGH (ref 22–29)
CREAT SERPL-MCNC: 1.7 MG/DL — HIGH (ref 0.5–1.3)
GLUCOSE BLDC GLUCOMTR-MCNC: 166 MG/DL — HIGH (ref 70–99)
GLUCOSE BLDC GLUCOMTR-MCNC: 225 MG/DL — HIGH (ref 70–99)
GLUCOSE BLDC GLUCOMTR-MCNC: 308 MG/DL — HIGH (ref 70–99)
GLUCOSE BLDC GLUCOMTR-MCNC: 315 MG/DL — HIGH (ref 70–99)
GLUCOSE SERPL-MCNC: 201 MG/DL — HIGH (ref 70–115)
POTASSIUM SERPL-MCNC: 3.9 MMOL/L — SIGNIFICANT CHANGE UP (ref 3.5–5.3)
POTASSIUM SERPL-SCNC: 3.9 MMOL/L — SIGNIFICANT CHANGE UP (ref 3.5–5.3)
SODIUM SERPL-SCNC: 146 MMOL/L — HIGH (ref 135–145)

## 2019-03-10 PROCEDURE — 99231 SBSQ HOSP IP/OBS SF/LOW 25: CPT

## 2019-03-10 PROCEDURE — 99232 SBSQ HOSP IP/OBS MODERATE 35: CPT

## 2019-03-10 PROCEDURE — 71045 X-RAY EXAM CHEST 1 VIEW: CPT | Mod: 26

## 2019-03-10 RX ORDER — CARVEDILOL PHOSPHATE 80 MG/1
6.25 CAPSULE, EXTENDED RELEASE ORAL EVERY 12 HOURS
Qty: 0 | Refills: 0 | Status: DISCONTINUED | OUTPATIENT
Start: 2019-03-10 | End: 2019-03-26

## 2019-03-10 RX ADMIN — SENNA PLUS 2 TABLET(S): 8.6 TABLET ORAL at 22:48

## 2019-03-10 RX ADMIN — Medication 4: at 22:47

## 2019-03-10 RX ADMIN — Medication 3 MILLILITER(S): at 09:48

## 2019-03-10 RX ADMIN — Medication 3 UNIT(S): at 17:30

## 2019-03-10 RX ADMIN — Medication 100 MILLIGRAM(S): at 22:47

## 2019-03-10 RX ADMIN — HEPARIN SODIUM 5000 UNIT(S): 5000 INJECTION INTRAVENOUS; SUBCUTANEOUS at 18:01

## 2019-03-10 RX ADMIN — Medication 8: at 17:29

## 2019-03-10 RX ADMIN — Medication 100 MILLIGRAM(S): at 18:01

## 2019-03-10 RX ADMIN — CARVEDILOL PHOSPHATE 12.5 MILLIGRAM(S): 80 CAPSULE, EXTENDED RELEASE ORAL at 06:21

## 2019-03-10 RX ADMIN — ATORVASTATIN CALCIUM 80 MILLIGRAM(S): 80 TABLET, FILM COATED ORAL at 22:48

## 2019-03-10 RX ADMIN — Medication 650 MILLIGRAM(S): at 14:30

## 2019-03-10 RX ADMIN — ERTAPENEM SODIUM 120 MILLIGRAM(S): 1 INJECTION, POWDER, LYOPHILIZED, FOR SOLUTION INTRAMUSCULAR; INTRAVENOUS at 22:48

## 2019-03-10 RX ADMIN — Medication 30 MILLIGRAM(S): at 06:21

## 2019-03-10 RX ADMIN — Medication 3 MILLILITER(S): at 20:50

## 2019-03-10 RX ADMIN — Medication 3 UNIT(S): at 12:37

## 2019-03-10 RX ADMIN — GABAPENTIN 100 MILLIGRAM(S): 400 CAPSULE ORAL at 14:48

## 2019-03-10 RX ADMIN — Medication 40 MILLIGRAM(S): at 06:20

## 2019-03-10 RX ADMIN — GABAPENTIN 100 MILLIGRAM(S): 400 CAPSULE ORAL at 06:21

## 2019-03-10 RX ADMIN — Medication 650 MILLIGRAM(S): at 13:28

## 2019-03-10 RX ADMIN — Medication 81 MILLIGRAM(S): at 12:39

## 2019-03-10 RX ADMIN — Medication 100 MILLIGRAM(S): at 06:21

## 2019-03-10 RX ADMIN — GABAPENTIN 100 MILLIGRAM(S): 400 CAPSULE ORAL at 22:48

## 2019-03-10 RX ADMIN — AMLODIPINE BESYLATE 5 MILLIGRAM(S): 2.5 TABLET ORAL at 06:21

## 2019-03-10 RX ADMIN — MAGNESIUM OXIDE 400 MG ORAL TABLET 400 MILLIGRAM(S): 241.3 TABLET ORAL at 12:40

## 2019-03-10 RX ADMIN — CARVEDILOL PHOSPHATE 6.25 MILLIGRAM(S): 80 CAPSULE, EXTENDED RELEASE ORAL at 18:02

## 2019-03-10 RX ADMIN — Medication 2: at 08:34

## 2019-03-10 RX ADMIN — HEPARIN SODIUM 5000 UNIT(S): 5000 INJECTION INTRAVENOUS; SUBCUTANEOUS at 06:21

## 2019-03-10 RX ADMIN — Medication 4: at 12:36

## 2019-03-10 RX ADMIN — Medication 3 MILLILITER(S): at 03:29

## 2019-03-10 RX ADMIN — INSULIN GLARGINE 10 UNIT(S): 100 INJECTION, SOLUTION SUBCUTANEOUS at 22:48

## 2019-03-10 RX ADMIN — Medication 3 UNIT(S): at 08:35

## 2019-03-10 RX ADMIN — ISOSORBIDE MONONITRATE 60 MILLIGRAM(S): 60 TABLET, EXTENDED RELEASE ORAL at 12:41

## 2019-03-10 RX ADMIN — DULOXETINE HYDROCHLORIDE 60 MILLIGRAM(S): 30 CAPSULE, DELAYED RELEASE ORAL at 12:40

## 2019-03-10 RX ADMIN — PANTOPRAZOLE SODIUM 40 MILLIGRAM(S): 20 TABLET, DELAYED RELEASE ORAL at 06:21

## 2019-03-10 RX ADMIN — Medication 3 MILLILITER(S): at 14:40

## 2019-03-10 NOTE — PROGRESS NOTE ADULT - SUBJECTIVE AND OBJECTIVE BOX
NEPHROLOGY INTERVAL HPI/OVERNIGHT EVENTS:    Examined earlier    MEDICATIONS  (STANDING):  ALBUTerol    0.083% 10 milliGRAM(s) Nebulizer once  ALBUTerol/ipratropium for Nebulization 3 milliLiter(s) Nebulizer every 6 hours  amLODIPine   Tablet 5 milliGRAM(s) Oral daily  aspirin enteric coated 81 milliGRAM(s) Oral daily  atorvastatin 80 milliGRAM(s) Oral at bedtime  carvedilol 12.5 milliGRAM(s) Oral every 12 hours  dextrose 5%. 1000 milliLiter(s) (50 mL/Hr) IV Continuous <Continuous>  dextrose 50% Injectable 12.5 Gram(s) IV Push once  dextrose 50% Injectable 25 Gram(s) IV Push once  dextrose 50% Injectable 25 Gram(s) IV Push once  docusate sodium 100 milliGRAM(s) Oral two times a day  DULoxetine 60 milliGRAM(s) Oral daily  epoetin sara Injectable 10000 Unit(s) SubCutaneous <User Schedule>  ertapenem  IVPB 1000 milliGRAM(s) IV Intermittent every 24 hours  gabapentin 100 milliGRAM(s) Oral three times a day  heparin  Injectable 5000 Unit(s) SubCutaneous every 12 hours  insulin glargine Injectable (LANTUS) 10 Unit(s) SubCutaneous at bedtime  insulin lispro (HumaLOG) corrective regimen sliding scale   SubCutaneous three times a day before meals  insulin lispro (HumaLOG) corrective regimen sliding scale   SubCutaneous at bedtime  insulin lispro Injectable (HumaLOG) 3 Unit(s) SubCutaneous three times a day before meals  isosorbide   mononitrate ER Tablet (IMDUR) 60 milliGRAM(s) Oral daily  magnesium oxide 400 milliGRAM(s) Oral daily  pantoprazole    Tablet 40 milliGRAM(s) Oral before breakfast  polyethylene glycol 3350 17 Gram(s) Oral daily  predniSONE   Tablet 30 milliGRAM(s) Oral daily  senna 2 Tablet(s) Oral at bedtime  torsemide 40 milliGRAM(s) Oral daily    MEDICATIONS  (PRN):  acetaminophen   Tablet .. 650 milliGRAM(s) Oral every 6 hours PRN Mild Pain (1 - 3)  dextrose 40% Gel 15 Gram(s) Oral once PRN Blood Glucose LESS THAN 70 milliGRAM(s)/deciliter  glucagon  Injectable 1 milliGRAM(s) IntraMuscular once PRN Glucose LESS THAN 70 milligrams/deciliter  glycopyrrolate Injectable 0.1 milliGRAM(s) IV Push every 8 hours PRN secreations  guaiFENesin    Syrup 100 milliGRAM(s) Oral every 6 hours PRN Cough  ondansetron Injectable 4 milliGRAM(s) IV Push every 6 hours PRN Nausea and/or Vomiting      Allergies    Accupril (Other)    Intolerances        Vital Signs Last 24 Hrs  T(C): 36.7 (10 Mar 2019 09:59), Max: 36.8 (09 Mar 2019 22:17)  T(F): 98 (10 Mar 2019 09:59), Max: 98.2 (09 Mar 2019 22:17)  HR: 61 (10 Mar 2019 12:30) (58 - 65)  BP: 118/58 (10 Mar 2019 12:30) (106/54 - 143/72)  BP(mean): --  RR: 16 (10 Mar 2019 09:59) (16 - 20)  SpO2: 95% (10 Mar 2019 09:59) (87% - 99%)  Daily     Daily Weight in k.1 (10 Mar 2019 05:15)    PHYSICAL EXAM:  Appears chronically ill  Cardiovascular: Normal S1 S2, No rub  Respiratory: Clear lungs with decreased BS at bases; R chest tube noted	  Neuro: A & O x 3; non focal  Gastrointestinal:  Soft, Non-tender, + BS	  Extremities: + LE edema    LABS:                        10.2   8.5   )-----------( 141      ( 09 Mar 2019 06:09 )             30.7     03-10    146<H>  |  96<L>  |  86.0<H>  ----------------------------<  201<H>  3.9   |  38.0<H>  |  1.70<H>    Ca    8.4<L>      10 Mar 2019 05:59                  RADIOLOGY & ADDITIONAL TESTS:

## 2019-03-10 NOTE — PROGRESS NOTE ADULT - ASSESSMENT
KEITH on CKD stage III in setting of decompensated CHF (R > L)  Cr stable  Elevated BUN also due to steroids  1.1g proteinuria  - cont Torsemide ==> will need to tolerate azotemia  - monitor labs  - avoid potential nephrotoxins    Anemia: +CRF +GIB  s/p IV Fe==> Tsat=27%  - cont QUENTIN  - monitor H/H  -  target Hgb=10.0  - consider PRBCs to maximize O2 carrying capacity    Will follow

## 2019-03-10 NOTE — PROGRESS NOTE ADULT - SUBJECTIVE AND OBJECTIVE BOX
Internal Medicine Hospitalist - Dr. Lobo ROCHA    [id number]    63y      Female    Patient is a 63y old  Female who presents with a chief complaint of SOB (10 Mar 2019 14:02)    INTERVAL HPI/ OVERNIGHT EVENTS: Patient is seen and examined, report mild SOB, denied chest pain, abd. pain, nausea and vomiting    REVIEW OF SYSTEMS:    Denied fever, chills, abd. pain, nausea, vomiting, chest pain,  headache, dizziness    PHYSICAL EXAM:    Vital Signs Last 24 Hrs  T(C): 36.7 (10 Mar 2019 09:59), Max: 36.8 (09 Mar 2019 22:17)  T(F): 98 (10 Mar 2019 09:59), Max: 98.2 (09 Mar 2019 22:17)  HR: 63 (10 Mar 2019 14:41) (58 - 65)  BP: 118/58 (10 Mar 2019 12:30) (106/54 - 143/72)  BP(mean): --  RR: 16 (10 Mar 2019 09:59) (16 - 20)  SpO2: 92% (10 Mar 2019 14:41) (87% - 99%)    GENERAL: NAD  CHEST/LUNG: positive crackles over bases  HEART: S1S2+ audible  ABDOMEN: Soft, Nontender, Nondistended; Bowel sounds present  EXTREMITIES:  no edema  CNS: AAO X 3  Psychiatry: normal mood    LABS:                        10.2   8.5   )-----------( 141      ( 09 Mar 2019 06:09 )             30.7     03-10    146<H>  |  96<L>  |  86.0<H>  ----------------------------<  201<H>  3.9   |  38.0<H>  |  1.70<H>    Ca    8.4<L>      10 Mar 2019 05:59              MEDICATIONS  (STANDING):  ALBUTerol    0.083% 10 milliGRAM(s) Nebulizer once  ALBUTerol/ipratropium for Nebulization 3 milliLiter(s) Nebulizer every 6 hours  amLODIPine   Tablet 5 milliGRAM(s) Oral daily  aspirin enteric coated 81 milliGRAM(s) Oral daily  atorvastatin 80 milliGRAM(s) Oral at bedtime  carvedilol 6.25 milliGRAM(s) Oral every 12 hours  dextrose 5%. 1000 milliLiter(s) (50 mL/Hr) IV Continuous <Continuous>  dextrose 50% Injectable 12.5 Gram(s) IV Push once  dextrose 50% Injectable 25 Gram(s) IV Push once  dextrose 50% Injectable 25 Gram(s) IV Push once  docusate sodium 100 milliGRAM(s) Oral two times a day  DULoxetine 60 milliGRAM(s) Oral daily  epoetin sara Injectable 91387 Unit(s) SubCutaneous <User Schedule>  ertapenem  IVPB 1000 milliGRAM(s) IV Intermittent every 24 hours  gabapentin 100 milliGRAM(s) Oral three times a day  heparin  Injectable 5000 Unit(s) SubCutaneous every 12 hours  insulin glargine Injectable (LANTUS) 10 Unit(s) SubCutaneous at bedtime  insulin lispro (HumaLOG) corrective regimen sliding scale   SubCutaneous three times a day before meals  insulin lispro (HumaLOG) corrective regimen sliding scale   SubCutaneous at bedtime  insulin lispro Injectable (HumaLOG) 3 Unit(s) SubCutaneous three times a day before meals  isosorbide   mononitrate ER Tablet (IMDUR) 60 milliGRAM(s) Oral daily  magnesium oxide 400 milliGRAM(s) Oral daily  pantoprazole    Tablet 40 milliGRAM(s) Oral before breakfast  polyethylene glycol 3350 17 Gram(s) Oral daily  predniSONE   Tablet 30 milliGRAM(s) Oral daily  senna 2 Tablet(s) Oral at bedtime  torsemide 40 milliGRAM(s) Oral daily    MEDICATIONS  (PRN):  acetaminophen   Tablet .. 650 milliGRAM(s) Oral every 6 hours PRN Mild Pain (1 - 3)  dextrose 40% Gel 15 Gram(s) Oral once PRN Blood Glucose LESS THAN 70 milliGRAM(s)/deciliter  glucagon  Injectable 1 milliGRAM(s) IntraMuscular once PRN Glucose LESS THAN 70 milligrams/deciliter  glycopyrrolate Injectable 0.1 milliGRAM(s) IV Push every 8 hours PRN secreations  guaiFENesin    Syrup 100 milliGRAM(s) Oral every 6 hours PRN Cough  ondansetron Injectable 4 milliGRAM(s) IV Push every 6 hours PRN Nausea and/or Vomiting      RADIOLOGY & ADDITIONAL TEST

## 2019-03-10 NOTE — PROGRESS NOTE ADULT - ASSESSMENT
62 yo presenting to ED w/ SOB 2/2 diastolic HF exacerbation and noted to have KEITH and hyperkalemia, PMH CAD s/p CABG and PCI, PAD s/p baloon angioplasty, DM2, HTN, HLD, hx of cardiac arrest, recent GI bleed last month, Diastolic CHF, legally blind, pleural effusion requiring thoracentesis in Dec 2018, admitted for SOB, noted to have pleural effusion, started on bumex, s/p RRT noted on 3/1 for hypotension & lethargy. Pt became hypotensive after Bumex with albumin was given.  CXR with worsening plum edema b/l. BiPAP placed. ICU consult called for vasopressor support during IV diuresis. Midodrine ordered. She became increasingly lethargic with ABG showing resp acidosis with hypoxia. Pulmonary consult appreciated- started on IV steroids, feeling better, started on steroid taper.     A/p    >Acute on Chronic Hypercapnic Respiratory Failure due to OHS/KYREE   Pulm recommend chronic NIV for chronic hypercarbic respiratory failure to improve symptoms and to decrease hospitalizations  c/w Bipap qhs  Eventual SINGH with NIV  c/w steroid taper   - Avoid Narcotics for pain    >UTI - appreciate ID input - c/w IN invanz - end date 03/10    >Dysphagia - had a RR due to aspiration, speech eval appreciated - recommend regular diet with thin liquid    >Chest pain with hx of CAD & CABG/PCI-  PRBC to improve oxygen to myocardium- goal around 10 . c/w Imdur, BB, statin, ASA. Plavix on hold since last admission due to GI Bleed.      > Lower GI Bleed likely diverticular-   - s/p 1 unit of PRBC  - s/p Colonoscopy -~ Poor prep throughout the colon. Diverticulosis seen. Unable to visualize colon sufficiently for poor prep  No further GI w/u recommended, ASA resumed as per GI, Plavix yet on hold  Advanced diet     >Hyperkalemia  - Hold Losartan    > Acute on Chronic Diastolic Heart Failure with severe PHTN  & right ventricular failure from OHS/ KYREE- now with pulm edema & pleural effusions  - c/w Torsemide  c/w coreg & Imdur  - Hold Losartan due to hyperkalemia  - ECHO shows EF of > 75%. Moderate to Severe TR, mod-severe PHTN    >Pleural effusion  appreciate Ct surgery - repeat CXR in AM.  maintain chest tube to waterseal at this time.    > Acute on chronic kidney injury - improving  appreciate renal input - - cont Torsemide ==> will need to tolerate azotemia  ct hold losartan   f/u BMP     > CAD stents in 2014 / PAD / HLD / HTN  - Continue, Imdur, Lipitor, Coreg and Norvasc   - Plavix on hold since last admission due to GI Bleed. ASA resumed as per GI, plavix yet on hold    > DM 2  - HbA1c 5.6  - Lantus at 10 units at bedtime plus Humalog 3U   - c/w moderate sliding scale  - Steroids taper    > Anemia  - Multifactorial (chronic, now complicated with acute bleeding)  - s/p Venofer   - Continue Procrit  + stool for occult blood (will need GI work-up as outpt)  - Monitor CBC      > Transaminitis- improved  - Likely secondary to liver congestion    > History of Depression  - Continue Duloxetine 60 mg    >B/L UE swelling  - Arm elevation  - Warm compresses   - No DVT       DVT Prophylaxis -- heparin sc resumed,     >Goals of care - palliative on board   DNR  prognosis guarded

## 2019-03-10 NOTE — PROGRESS NOTE ADULT - SUBJECTIVE AND OBJECTIVE BOX
Subjective: Pt lying in bed sleeping.  NAD noted.    Vital Signs:  Vital Signs Last 24 Hrs  T(C): 36.7 (03-10-19 @ 09:59), Max: 36.8 (03-09-19 @ 22:17)  T(F): 98 (03-10-19 @ 09:59), Max: 98.2 (03-09-19 @ 22:17)  HR: 60 (03-10-19 @ 09:59) (58 - 66)  BP: 106/54 (03-10-19 @ 09:59) (106/54 - 143/72)  RR: 16 (03-10-19 @ 09:59) (16 - 20)  SpO2: 95% (03-10-19 @ 09:59) (87% - 99%)       Relevant labs, radiology and Medications reviewed    Neurology: A&Ox2   Respiratory: Diminished BLL R>L. +R CT to waterseal.  No crepitus.  CV: RRR, S1S2.  Abdominal: Soft, NT, ND +BS.  Extremities: Trace edema.     03-09 @ 06:01  -  03-10 @ 07:00  --------------------------------------------------------  IN:    Oral Fluid: 360 mL  Total IN: 360 mL    OUT:    Chest Tube: 205 mL    Indwelling Catheter - Urethral: 1700 mL  Total OUT: 1905 mL    Total NET: -1545 mL      03-10 @ 07:01  -  03-10 @ 12:14  --------------------------------------------------------  IN:    Oral Fluid: 260 mL  Total IN: 260 mL    OUT:  Total OUT: 0 mL    Total NET: 260 mL

## 2019-03-11 LAB
ANION GAP SERPL CALC-SCNC: 10 MMOL/L — SIGNIFICANT CHANGE UP (ref 5–17)
BUN SERPL-MCNC: 85 MG/DL — HIGH (ref 8–20)
CALCIUM SERPL-MCNC: 8.2 MG/DL — LOW (ref 8.6–10.2)
CHLORIDE SERPL-SCNC: 97 MMOL/L — LOW (ref 98–107)
CO2 SERPL-SCNC: 38 MMOL/L — HIGH (ref 22–29)
CREAT SERPL-MCNC: 1.42 MG/DL — HIGH (ref 0.5–1.3)
GLUCOSE BLDC GLUCOMTR-MCNC: 139 MG/DL — HIGH (ref 70–99)
GLUCOSE BLDC GLUCOMTR-MCNC: 207 MG/DL — HIGH (ref 70–99)
GLUCOSE BLDC GLUCOMTR-MCNC: 328 MG/DL — HIGH (ref 70–99)
GLUCOSE BLDC GLUCOMTR-MCNC: 374 MG/DL — HIGH (ref 70–99)
GLUCOSE SERPL-MCNC: 168 MG/DL — HIGH (ref 70–115)
HCT VFR BLD CALC: 26.5 % — LOW (ref 37–47)
HGB BLD-MCNC: 9 G/DL — LOW (ref 12–16)
MAGNESIUM SERPL-MCNC: 2.7 MG/DL — HIGH (ref 1.6–2.6)
MCHC RBC-ENTMCNC: 29.8 PG — SIGNIFICANT CHANGE UP (ref 27–31)
MCHC RBC-ENTMCNC: 34 G/DL — SIGNIFICANT CHANGE UP (ref 32–36)
MCV RBC AUTO: 87.7 FL — SIGNIFICANT CHANGE UP (ref 81–99)
PLATELET # BLD AUTO: 135 K/UL — LOW (ref 150–400)
POTASSIUM SERPL-MCNC: 3.8 MMOL/L — SIGNIFICANT CHANGE UP (ref 3.5–5.3)
POTASSIUM SERPL-SCNC: 3.8 MMOL/L — SIGNIFICANT CHANGE UP (ref 3.5–5.3)
RBC # BLD: 3.02 M/UL — LOW (ref 4.4–5.2)
RBC # FLD: 16 % — HIGH (ref 11–15.6)
SODIUM SERPL-SCNC: 145 MMOL/L — SIGNIFICANT CHANGE UP (ref 135–145)
WBC # BLD: 8.1 K/UL — SIGNIFICANT CHANGE UP (ref 4.8–10.8)
WBC # FLD AUTO: 8.1 K/UL — SIGNIFICANT CHANGE UP (ref 4.8–10.8)

## 2019-03-11 PROCEDURE — 99232 SBSQ HOSP IP/OBS MODERATE 35: CPT

## 2019-03-11 PROCEDURE — 71045 X-RAY EXAM CHEST 1 VIEW: CPT | Mod: 26,76

## 2019-03-11 RX ORDER — AMLODIPINE BESYLATE 2.5 MG/1
1 TABLET ORAL
Qty: 0 | Refills: 0 | DISCHARGE
Start: 2019-03-11

## 2019-03-11 RX ORDER — DOCUSATE SODIUM 100 MG
1 CAPSULE ORAL
Qty: 0 | Refills: 0 | DISCHARGE
Start: 2019-03-11

## 2019-03-11 RX ORDER — ISOSORBIDE MONONITRATE 60 MG/1
1 TABLET, EXTENDED RELEASE ORAL
Qty: 0 | Refills: 0 | DISCHARGE
Start: 2019-03-11

## 2019-03-11 RX ORDER — ASPIRIN/CALCIUM CARB/MAGNESIUM 324 MG
1 TABLET ORAL
Qty: 0 | Refills: 0 | COMMUNITY
Start: 2019-03-11

## 2019-03-11 RX ORDER — DULOXETINE HYDROCHLORIDE 30 MG/1
1 CAPSULE, DELAYED RELEASE ORAL
Qty: 0 | Refills: 0 | DISCHARGE
Start: 2019-03-11

## 2019-03-11 RX ORDER — ASCORBIC ACID 60 MG
500 TABLET,CHEWABLE ORAL DAILY
Qty: 0 | Refills: 0 | Status: DISCONTINUED | OUTPATIENT
Start: 2019-03-11 | End: 2019-03-26

## 2019-03-11 RX ORDER — ATORVASTATIN CALCIUM 80 MG/1
1 TABLET, FILM COATED ORAL
Qty: 0 | Refills: 0 | DISCHARGE
Start: 2019-03-11

## 2019-03-11 RX ORDER — POLYETHYLENE GLYCOL 3350 17 G/17G
17 POWDER, FOR SOLUTION ORAL
Qty: 0 | Refills: 0 | DISCHARGE
Start: 2019-03-11

## 2019-03-11 RX ORDER — MAGNESIUM OXIDE 400 MG ORAL TABLET 241.3 MG
1 TABLET ORAL
Qty: 0 | Refills: 0 | DISCHARGE
Start: 2019-03-11

## 2019-03-11 RX ORDER — ALBUTEROL 90 UG/1
2.5 AEROSOL, METERED ORAL
Qty: 0 | Refills: 0 | DISCHARGE
Start: 2019-03-11

## 2019-03-11 RX ORDER — SENNA PLUS 8.6 MG/1
2 TABLET ORAL
Qty: 0 | Refills: 0 | DISCHARGE
Start: 2019-03-11

## 2019-03-11 RX ORDER — PANTOPRAZOLE SODIUM 20 MG/1
1 TABLET, DELAYED RELEASE ORAL
Qty: 0 | Refills: 0 | DISCHARGE
Start: 2019-03-11

## 2019-03-11 RX ORDER — IPRATROPIUM/ALBUTEROL SULFATE 18-103MCG
3 AEROSOL WITH ADAPTER (GRAM) INHALATION
Qty: 0 | Refills: 0 | COMMUNITY
Start: 2019-03-11

## 2019-03-11 RX ORDER — GABAPENTIN 400 MG/1
1 CAPSULE ORAL
Qty: 0 | Refills: 0 | DISCHARGE
Start: 2019-03-11

## 2019-03-11 RX ORDER — ASCORBIC ACID 60 MG
1 TABLET,CHEWABLE ORAL
Qty: 0 | Refills: 0 | DISCHARGE
Start: 2019-03-11

## 2019-03-11 RX ORDER — CARVEDILOL PHOSPHATE 80 MG/1
1 CAPSULE, EXTENDED RELEASE ORAL
Qty: 0 | Refills: 0 | DISCHARGE
Start: 2019-03-11

## 2019-03-11 RX ADMIN — GABAPENTIN 100 MILLIGRAM(S): 400 CAPSULE ORAL at 21:53

## 2019-03-11 RX ADMIN — Medication 3 MILLILITER(S): at 09:45

## 2019-03-11 RX ADMIN — CARVEDILOL PHOSPHATE 6.25 MILLIGRAM(S): 80 CAPSULE, EXTENDED RELEASE ORAL at 06:30

## 2019-03-11 RX ADMIN — CARVEDILOL PHOSPHATE 6.25 MILLIGRAM(S): 80 CAPSULE, EXTENDED RELEASE ORAL at 18:07

## 2019-03-11 RX ADMIN — HEPARIN SODIUM 5000 UNIT(S): 5000 INJECTION INTRAVENOUS; SUBCUTANEOUS at 10:30

## 2019-03-11 RX ADMIN — MAGNESIUM OXIDE 400 MG ORAL TABLET 400 MILLIGRAM(S): 241.3 TABLET ORAL at 10:30

## 2019-03-11 RX ADMIN — INSULIN GLARGINE 10 UNIT(S): 100 INJECTION, SOLUTION SUBCUTANEOUS at 21:52

## 2019-03-11 RX ADMIN — PANTOPRAZOLE SODIUM 40 MILLIGRAM(S): 20 TABLET, DELAYED RELEASE ORAL at 06:30

## 2019-03-11 RX ADMIN — Medication 500 MILLIGRAM(S): at 18:06

## 2019-03-11 RX ADMIN — Medication 30 MILLIGRAM(S): at 06:30

## 2019-03-11 RX ADMIN — GABAPENTIN 100 MILLIGRAM(S): 400 CAPSULE ORAL at 06:30

## 2019-03-11 RX ADMIN — Medication 3 UNIT(S): at 08:29

## 2019-03-11 RX ADMIN — Medication 3 UNIT(S): at 18:01

## 2019-03-11 RX ADMIN — SENNA PLUS 2 TABLET(S): 8.6 TABLET ORAL at 21:52

## 2019-03-11 RX ADMIN — Medication 3 MILLILITER(S): at 15:40

## 2019-03-11 RX ADMIN — ISOSORBIDE MONONITRATE 60 MILLIGRAM(S): 60 TABLET, EXTENDED RELEASE ORAL at 10:30

## 2019-03-11 RX ADMIN — Medication 81 MILLIGRAM(S): at 10:30

## 2019-03-11 RX ADMIN — Medication 10: at 18:00

## 2019-03-11 RX ADMIN — Medication 40 MILLIGRAM(S): at 06:30

## 2019-03-11 RX ADMIN — GABAPENTIN 100 MILLIGRAM(S): 400 CAPSULE ORAL at 13:45

## 2019-03-11 RX ADMIN — Medication 3 MILLILITER(S): at 21:18

## 2019-03-11 RX ADMIN — Medication 3 MILLILITER(S): at 03:06

## 2019-03-11 RX ADMIN — ATORVASTATIN CALCIUM 80 MILLIGRAM(S): 80 TABLET, FILM COATED ORAL at 21:52

## 2019-03-11 RX ADMIN — Medication 100 MILLIGRAM(S): at 06:48

## 2019-03-11 RX ADMIN — DULOXETINE HYDROCHLORIDE 60 MILLIGRAM(S): 30 CAPSULE, DELAYED RELEASE ORAL at 10:30

## 2019-03-11 RX ADMIN — Medication 4: at 21:52

## 2019-03-11 RX ADMIN — Medication 4: at 12:25

## 2019-03-11 RX ADMIN — HEPARIN SODIUM 5000 UNIT(S): 5000 INJECTION INTRAVENOUS; SUBCUTANEOUS at 21:53

## 2019-03-11 RX ADMIN — Medication 3 UNIT(S): at 12:25

## 2019-03-11 NOTE — PROGRESS NOTE ADULT - SUBJECTIVE AND OBJECTIVE BOX
PULMONARY PROGRESS NOTE      RANJIT ROCHA-[id number]    Patient is a 63y old  Female who presents with a chief complaint of SOB (11 Mar 2019 08:29)  64 y/o woman was admitted with SOB on  to [facility]. She has diastolic HF, KEITH, HTN, DM2. CAD s/p CABG, PAD and persistent pleural effusion s/p chest tube      INTERVAL HPI/OVERNIGHT EVENTS:    No interval change    MEDICATIONS  (STANDING):  ALBUTerol    0.083% 10 milliGRAM(s) Nebulizer once  ALBUTerol/ipratropium for Nebulization 3 milliLiter(s) Nebulizer every 6 hours  amLODIPine   Tablet 5 milliGRAM(s) Oral daily  aspirin enteric coated 81 milliGRAM(s) Oral daily  atorvastatin 80 milliGRAM(s) Oral at bedtime  carvedilol 6.25 milliGRAM(s) Oral every 12 hours  dextrose 5%. 1000 milliLiter(s) (50 mL/Hr) IV Continuous <Continuous>  dextrose 50% Injectable 12.5 Gram(s) IV Push once  dextrose 50% Injectable 25 Gram(s) IV Push once  dextrose 50% Injectable 25 Gram(s) IV Push once  docusate sodium 100 milliGRAM(s) Oral two times a day  DULoxetine 60 milliGRAM(s) Oral daily  epoetin sara Injectable 69869 Unit(s) SubCutaneous <User Schedule>  ertapenem  IVPB 1000 milliGRAM(s) IV Intermittent every 24 hours  gabapentin 100 milliGRAM(s) Oral three times a day  heparin  Injectable 5000 Unit(s) SubCutaneous every 12 hours  insulin glargine Injectable (LANTUS) 10 Unit(s) SubCutaneous at bedtime  insulin lispro (HumaLOG) corrective regimen sliding scale   SubCutaneous three times a day before meals  insulin lispro (HumaLOG) corrective regimen sliding scale   SubCutaneous at bedtime  insulin lispro Injectable (HumaLOG) 3 Unit(s) SubCutaneous three times a day before meals  isosorbide   mononitrate ER Tablet (IMDUR) 60 milliGRAM(s) Oral daily  magnesium oxide 400 milliGRAM(s) Oral daily  pantoprazole    Tablet 40 milliGRAM(s) Oral before breakfast  polyethylene glycol 3350 17 Gram(s) Oral daily  predniSONE   Tablet   Oral   senna 2 Tablet(s) Oral at bedtime  torsemide 40 milliGRAM(s) Oral daily      MEDICATIONS  (PRN):  acetaminophen   Tablet .. 650 milliGRAM(s) Oral every 6 hours PRN Mild Pain (1 - 3)  dextrose 40% Gel 15 Gram(s) Oral once PRN Blood Glucose LESS THAN 70 milliGRAM(s)/deciliter  glucagon  Injectable 1 milliGRAM(s) IntraMuscular once PRN Glucose LESS THAN 70 milligrams/deciliter  glycopyrrolate Injectable 0.1 milliGRAM(s) IV Push every 8 hours PRN secreations  guaiFENesin    Syrup 100 milliGRAM(s) Oral every 6 hours PRN Cough  ondansetron Injectable 4 milliGRAM(s) IV Push every 6 hours PRN Nausea and/or Vomiting      Allergies    Accupril (Other)    Intolerances        PAST MEDICAL & SURGICAL HISTORY:  Herniated disc, cervical  PAD (peripheral artery disease)  Legally blind  History of peripheral vascular disease  History of retinal detachment  History of CEA (carotid endarterectomy): Right  Hyperlipidemia  Glaucoma  Hypertension  Diabetes  S/P CABG x 1  After cataract  Uveitic glaucoma of both eyes  Detached retina  Atherosclerosis of right carotid artery   delivery delivered      SOCIAL HISTORY  Smoking History:       REVIEW OF SYSTEMS:    CONSTITUTIONAL:  No distress    HEENT:  Eyes:  No diplopia or blurred vision. ENT:  No earache, sore throat or runny nose.    CARDIOVASCULAR:  No pressure, squeezing, tightness, heaviness or aching about the chest; no palpitations.    RESPIRATORY:  No cough, shortness of breath, PND or orthopnea. Mild SOBOE    GASTROINTESTINAL:  No nausea, vomiting or diarrhea.    GENITOURINARY:  No dysuria, frequency or urgency.    NEUROLOGIC:  No paresthesias, fasciculations, seizures or weakness.    PSYCHIATRIC:  No disorder of thought or mood.    Vital Signs Last 24 Hrs  T(C): 37.2 (11 Mar 2019 10:00), Max: 37.2 (11 Mar 2019 10:00)  T(F): 99 (11 Mar 2019 10:00), Max: 99 (11 Mar 2019 10:00)  HR: 60 (11 Mar 2019 10:00) (56 - 67)  BP: 112/71 (11 Mar 2019 10:00) (100/50 - 130/54)  BP(mean): --  RR: 18 (11 Mar 2019 10:00) (18 - 22)  SpO2: 91% (11 Mar 2019 09:46) (91% - 100%)    PHYSICAL EXAMINATION:    GENERAL: The patient is awake and alert in no apparent distress.     HEENT: Head is normocephalic and atraumatic. Extraocular muscles are intact. Mucous membranes are moist.    NECK: Supple.    LUNGS: Clear to auscultation without wheezing, rales or rhonchi; respirations unlabored    HEART: Regular rate and rhythm without murmur.    ABDOMEN: Soft, nontender, and nondistended.      EXTREMITIES: Without any cyanosis, clubbing, rash, lesions or edema.    NEUROLOGIC: Grossly intact.    LABS:                        9.0    8.1   )-----------( 135      ( 11 Mar 2019 06:20 )             26.5     -    145  |  97<L>  |  85.0<H>  ----------------------------<  168<H>  3.8   |  38.0<H>  |  1.42<H>    Ca    8.2<L>      11 Mar 2019 06:20  Mg     2.7               RADIOLOGY & ADDITIONAL STUDIES:    CXR on 3/11/19  Findings:  Improved aeration at the left lung base with decreasing left pleural   effusion and left basilar atelectasis. Persistent diffuse airspace   disease throughout the right lung, unchanged. Right pigtail drainage   catheter, unchanged. No evidence of pneumothorax..    Impression:  Improved aeration at the left lung base, otherwise stable.    TIA JOHNSTON M.D., ATTENDING RADIOLOGIST  This document has been electronically signed. Mar 11 2019  8:27AM

## 2019-03-11 NOTE — PROGRESS NOTE ADULT - ASSESSMENT
Assess    Acute on Chronic Diastolic HF with severe PHTN and RV failure from OHS/KYREE with pleural effusions  KEITH on CKD III    Rec    Continue Rx  Consider pleurex catheter

## 2019-03-11 NOTE — PROGRESS NOTE ADULT - ASSESSMENT
63 year old female with PMH of PAD, PVD, legally blind, obese, right CEA, glaucoma, HTN, DM, CABG, was admitted 2/11 with SOB/LE edema, complicated inpatient course including lower GI bleed. Thoracic surgery was consulted for right pleural effusion, had right pleural pigtail placed in IR on 2/28/19.   3/6/19 Chest tube to water seal on  with continued drainage.  questionable aspiration on 3/8 noted.   3/11/19 Right pleural chest tube removed

## 2019-03-11 NOTE — PROGRESS NOTE ADULT - SUBJECTIVE AND OBJECTIVE BOX
Subjective: "I feel okay, my breathing is better."    VITAL SIGNS  Vital Signs Last 24 Hrs  T(C): 36.8 (03-10-19 @ 22:38), Max: 36.8 (03-10-19 @ 22:38)  T(F): 98.2 (03-10-19 @ 22:38), Max: 98.2 (03-10-19 @ 22:38)  HR: 56 (19 @ 06:26) (56 - 67)  BP: 100/50 (19 @ 06:26) (100/50 - 130/54)  RR: 18 (19 @ 06:26) (16 - 22)  SpO2: 96% (19 @ 06:26) (87% - 100%)  on (O2)              Telemetry:    LVEF:     MEDICATIONS  acetaminophen   Tablet .. 650 milliGRAM(s) Oral every 6 hours PRN  ALBUTerol    0.083% 10 milliGRAM(s) Nebulizer once  ALBUTerol/ipratropium for Nebulization 3 milliLiter(s) Nebulizer every 6 hours  amLODIPine   Tablet 5 milliGRAM(s) Oral daily  aspirin enteric coated 81 milliGRAM(s) Oral daily  atorvastatin 80 milliGRAM(s) Oral at bedtime  carvedilol 6.25 milliGRAM(s) Oral every 12 hours  dextrose 40% Gel 15 Gram(s) Oral once PRN  dextrose 5%. 1000 milliLiter(s) IV Continuous <Continuous>  dextrose 50% Injectable 12.5 Gram(s) IV Push once  dextrose 50% Injectable 25 Gram(s) IV Push once  dextrose 50% Injectable 25 Gram(s) IV Push once  docusate sodium 100 milliGRAM(s) Oral two times a day  DULoxetine 60 milliGRAM(s) Oral daily  epoetin sara Injectable 38355 Unit(s) SubCutaneous <User Schedule>  ertapenem  IVPB 1000 milliGRAM(s) IV Intermittent every 24 hours  gabapentin 100 milliGRAM(s) Oral three times a day  glucagon  Injectable 1 milliGRAM(s) IntraMuscular once PRN  glycopyrrolate Injectable 0.1 milliGRAM(s) IV Push every 8 hours PRN  guaiFENesin    Syrup 100 milliGRAM(s) Oral every 6 hours PRN  heparin  Injectable 5000 Unit(s) SubCutaneous every 12 hours  insulin glargine Injectable (LANTUS) 10 Unit(s) SubCutaneous at bedtime  insulin lispro (HumaLOG) corrective regimen sliding scale   SubCutaneous three times a day before meals  insulin lispro (HumaLOG) corrective regimen sliding scale   SubCutaneous at bedtime  insulin lispro Injectable (HumaLOG) 3 Unit(s) SubCutaneous three times a day before meals  isosorbide   mononitrate ER Tablet (IMDUR) 60 milliGRAM(s) Oral daily  magnesium oxide 400 milliGRAM(s) Oral daily  ondansetron Injectable 4 milliGRAM(s) IV Push every 6 hours PRN  pantoprazole    Tablet 40 milliGRAM(s) Oral before breakfast  polyethylene glycol 3350 17 Gram(s) Oral daily  predniSONE   Tablet   Oral   senna 2 Tablet(s) Oral at bedtime  torsemide 40 milliGRAM(s) Oral daily      PHYSICAL EXAM  General: well nourished, well developed, no acute distress  Neurology: alert and oriented x 3, nonfocal, no gross deficits  Respiratory: clear to auscultation bilaterally  CV: regular rate and rhythm, normal S1, S2  Abdomen: soft, nontender, nondistended, positive bowel sounds, last bowel movement   Extremities: warm, well perfused. no edema. + DP pulses  Incisions: midline sternal incision, + mepilex, c/d/i. sternum stable.  Chest tubes:   Epicardial Wires:    > EPM (settings) / isolated    I&O's Detail    10 Mar 2019 07:01  -  11 Mar 2019 07:00  --------------------------------------------------------  IN:    Oral Fluid: 480 mL  Total IN: 480 mL    OUT:    Chest Tube: 100 mL    Indwelling Catheter - Urethral: 1450 mL  Total OUT: 1550 mL    Total NET: -1070 mL          Weights:  Daily     Daily Weight in k.1 (11 Mar 2019 05:18)  Admit Wt: Drug Dosing Weight  Height (cm): 162.56 (10 Feb 2019 18:12)  Weight (kg): 95.3 (2019 06:15)  BMI (kg/m2): 36.1 (2019 06:15)  BSA (m2): 2 (2019 06:15)    LABS  03-11    145  |  97<L>  |  85.0<H>  ----------------------------<  168<H>  3.8   |  38.0<H>  |  1.42<H>    Ca    8.2<L>      11 Mar 2019 06:20  Mg     2.7                                        9.0    8.1   )-----------( 135      ( 11 Mar 2019 06:20 )             26.5                  CAPILLARY BLOOD GLUCOSE      POCT Blood Glucose.: 139 mg/dL (11 Mar 2019 08:07)  POCT Blood Glucose.: 308 mg/dL (10 Mar 2019 21:49)  POCT Blood Glucose.: 315 mg/dL (10 Mar 2019 17:07)  POCT Blood Glucose.: 225 mg/dL (10 Mar 2019 12:04)           Today's CXR:    Today's EKG:    PAST MEDICAL & SURGICAL HISTORY:  Herniated disc, cervical  PAD (peripheral artery disease)  Legally blind  History of peripheral vascular disease  History of retinal detachment  History of CEA (carotid endarterectomy): Right  Hyperlipidemia  Glaucoma  Hypertension  Diabetes  S/P CABG x 1  After cataract  Uveitic glaucoma of both eyes  Detached retina  Atherosclerosis of right carotid artery   delivery delivered       ASSESSMENT  63y Female was admitted on (Date) from (home/other facility) with    Preop course:    On (Date), pt underwent Chest tube placement with US guidance  Colonoscopy  Peripheral insertion of midline catheter  Vascular access with ultrasound guidance  .    Postoperative Course/Issues:     PLAN  Neuro: Pain management  Pulm: Encourage coughing, deep breathing and use of incentive spirometry. Wean off supplemental oxygen as able. Daily CXR.   Cardio: Continue Aspirin, Lipitor. (BB)  GI: Tolerating diet. Continue stool softeners.  Renal: Keep negative fluid balance. (Diuretics) Trend BUN/Cr. Supplement electrolytes prn.   :   Vasc: Lovenox/SCDs for DVT prophylaxis  Heme: Stable H/H. Trend CBC daily.   Endo:  ID: Off antibiotics. Stable.  Therapy: PT daily as tolerated.  Tubes/Wires:   Disposition: Aim to D/C to home on  Discussed with Cardiothoracic Team at AM rounds. Subjective: "I feel okay, my breathing is better."  Pt. lying in bed, denies any SOB or chest pain, NAD noted.    VITAL SIGNS  Vital Signs Last 24 Hrs  T(C): 36.8 (03-10-19 @ 22:38), Max: 36.8 (03-10-19 @ 22:38)  T(F): 98.2 (03-10-19 @ 22:38), Max: 98.2 (03-10-19 @ 22:38)  HR: 56 (19 @ 06:26) (56 - 67)  BP: 100/50 (19 @ 06:26) (100/50 - 130/54)  RR: 18 (19 @ 06:26) (16 - 22)  SpO2: 96% (19 @ 06:26) (87% - 100%)  on (O2)              Telemetry: Sinus rhythm     MEDICATIONS  acetaminophen   Tablet .. 650 milliGRAM(s) Oral every 6 hours PRN  ALBUTerol    0.083% 10 milliGRAM(s) Nebulizer once  ALBUTerol/ipratropium for Nebulization 3 milliLiter(s) Nebulizer every 6 hours  amLODIPine   Tablet 5 milliGRAM(s) Oral daily  aspirin enteric coated 81 milliGRAM(s) Oral daily  atorvastatin 80 milliGRAM(s) Oral at bedtime  carvedilol 6.25 milliGRAM(s) Oral every 12 hours  dextrose 40% Gel 15 Gram(s) Oral once PRN  dextrose 5%. 1000 milliLiter(s) IV Continuous <Continuous>  dextrose 50% Injectable 12.5 Gram(s) IV Push once  dextrose 50% Injectable 25 Gram(s) IV Push once  dextrose 50% Injectable 25 Gram(s) IV Push once  docusate sodium 100 milliGRAM(s) Oral two times a day  DULoxetine 60 milliGRAM(s) Oral daily  epoetin sara Injectable 72263 Unit(s) SubCutaneous <User Schedule>  ertapenem  IVPB 1000 milliGRAM(s) IV Intermittent every 24 hours  gabapentin 100 milliGRAM(s) Oral three times a day  glucagon  Injectable 1 milliGRAM(s) IntraMuscular once PRN  glycopyrrolate Injectable 0.1 milliGRAM(s) IV Push every 8 hours PRN  guaiFENesin    Syrup 100 milliGRAM(s) Oral every 6 hours PRN  heparin  Injectable 5000 Unit(s) SubCutaneous every 12 hours  insulin glargine Injectable (LANTUS) 10 Unit(s) SubCutaneous at bedtime  insulin lispro (HumaLOG) corrective regimen sliding scale   SubCutaneous three times a day before meals  insulin lispro (HumaLOG) corrective regimen sliding scale   SubCutaneous at bedtime  insulin lispro Injectable (HumaLOG) 3 Unit(s) SubCutaneous three times a day before meals  isosorbide   mononitrate ER Tablet (IMDUR) 60 milliGRAM(s) Oral daily  magnesium oxide 400 milliGRAM(s) Oral daily  ondansetron Injectable 4 milliGRAM(s) IV Push every 6 hours PRN  pantoprazole    Tablet 40 milliGRAM(s) Oral before breakfast  polyethylene glycol 3350 17 Gram(s) Oral daily  predniSONE   Tablet   Oral   senna 2 Tablet(s) Oral at bedtime  torsemide 40 milliGRAM(s) Oral daily      PHYSICAL EXAM  General: well nourished, well developed, no acute distress  Neurology: alert and oriented x 3, nonfocal, no gross deficits  Respiratory: Slightly diminished at the bases.   CV: regular rate and rhythm, normal S1, S2  Abdomen: soft, nontender, nondistended, positive bowel sounds, last bowel movement   Extremities: warm, well perfused. no edema. + DP pulses  Incisions: midline sternal incision, + mepilex, c/d/i. sternum stable.      I&O's Detail    10 Mar 2019 07:01  -  11 Mar 2019 07:00  --------------------------------------------------------  IN:    Oral Fluid: 480 mL  Total IN: 480 mL    OUT:    Chest Tube: 100 mL    Indwelling Catheter - Urethral: 1450 mL  Total OUT: 1550 mL    Total NET: -1070 mL          Weights:  Daily     Daily Weight in k.1 (11 Mar 2019 05:18)  Admit Wt: Drug Dosing Weight  Height (cm): 162.56 (10 Feb 2019 18:12)  Weight (kg): 95.3 (2019 06:15)  BMI (kg/m2): 36.1 (2019 06:15)  BSA (m2): 2 (2019 06:15)    LABS  03-    145  |  97<L>  |  85.0<H>  ----------------------------<  168<H>  3.8   |  38.0<H>  |  1.42<H>    Ca    8.2<L>      11 Mar 2019 06:20  Mg     2.7                                        9.0    8.1   )-----------( 135      ( 11 Mar 2019 06:20 )             26.5                  CAPILLARY BLOOD GLUCOSE      POCT Blood Glucose.: 139 mg/dL (11 Mar 2019 08:07)  POCT Blood Glucose.: 308 mg/dL (10 Mar 2019 21:49)  POCT Blood Glucose.: 315 mg/dL (10 Mar 2019 17:07)  POCT Blood Glucose.: 225 mg/dL (10 Mar 2019 12:04)           Today's CXR:    Today's EKG:    PAST MEDICAL & SURGICAL HISTORY:  Herniated disc, cervical  PAD (peripheral artery disease)  Legally blind  History of peripheral vascular disease  History of retinal detachment  History of CEA (carotid endarterectomy): Right  Hyperlipidemia  Glaucoma  Hypertension  Diabetes  S/P CABG x 1  After cataract  Uveitic glaucoma of both eyes  Detached retina  Atherosclerosis of right carotid artery   delivery delivered       ASSESSMENT  63y Female was admitted on (Date) from (home/other facility) with    Preop course:    On (Date), pt underwent Chest tube placement with US guidance  Colonoscopy  Peripheral insertion of midline catheter  Vascular access with ultrasound guidance  .    Postoperative Course/Issues:     PLAN  Neuro: Pain management  Pulm: Encourage coughing, deep breathing and use of incentive spirometry. Wean off supplemental oxygen as able. Daily CXR.   Cardio: Continue Aspirin, Lipitor. (BB)  GI: Tolerating diet. Continue stool softeners.  Renal: Keep negative fluid balance. (Diuretics) Trend BUN/Cr. Supplement electrolytes prn.   :   Vasc: Lovenox/SCDs for DVT prophylaxis  Heme: Stable H/H. Trend CBC daily.   Endo:  ID: Off antibiotics. Stable.  Therapy: PT daily as tolerated.  Tubes/Wires:   Disposition: Aim to D/C to home on  Discussed with Cardiothoracic Team at AM rounds. Subjective: "I feel okay, my breathing is better."  Pt. lying in bed, denies any SOB or chest pain, NAD noted.    VITAL SIGNS  Vital Signs Last 24 Hrs  T(C): 36.8 (03-10-19 @ 22:38), Max: 36.8 (03-10-19 @ 22:38)  T(F): 98.2 (03-10-19 @ 22:38), Max: 98.2 (03-10-19 @ 22:38)  HR: 56 (19 @ 06:26) (56 - 67)  BP: 100/50 (19 @ 06:26) (100/50 - 130/54)  RR: 18 (19 @ 06:26) (16 - 22)  SpO2: 96% (19 @ 06:26) (87% - 100%)  on (O2)              Telemetry: Sinus rhythm     MEDICATIONS  acetaminophen   Tablet .. 650 milliGRAM(s) Oral every 6 hours PRN  ALBUTerol    0.083% 10 milliGRAM(s) Nebulizer once  ALBUTerol/ipratropium for Nebulization 3 milliLiter(s) Nebulizer every 6 hours  amLODIPine   Tablet 5 milliGRAM(s) Oral daily  aspirin enteric coated 81 milliGRAM(s) Oral daily  atorvastatin 80 milliGRAM(s) Oral at bedtime  carvedilol 6.25 milliGRAM(s) Oral every 12 hours  dextrose 40% Gel 15 Gram(s) Oral once PRN  dextrose 5%. 1000 milliLiter(s) IV Continuous <Continuous>  dextrose 50% Injectable 12.5 Gram(s) IV Push once  dextrose 50% Injectable 25 Gram(s) IV Push once  dextrose 50% Injectable 25 Gram(s) IV Push once  docusate sodium 100 milliGRAM(s) Oral two times a day  DULoxetine 60 milliGRAM(s) Oral daily  epoetin sara Injectable 29176 Unit(s) SubCutaneous <User Schedule>  ertapenem  IVPB 1000 milliGRAM(s) IV Intermittent every 24 hours  gabapentin 100 milliGRAM(s) Oral three times a day  glucagon  Injectable 1 milliGRAM(s) IntraMuscular once PRN  glycopyrrolate Injectable 0.1 milliGRAM(s) IV Push every 8 hours PRN  guaiFENesin    Syrup 100 milliGRAM(s) Oral every 6 hours PRN  heparin  Injectable 5000 Unit(s) SubCutaneous every 12 hours  insulin glargine Injectable (LANTUS) 10 Unit(s) SubCutaneous at bedtime  insulin lispro (HumaLOG) corrective regimen sliding scale   SubCutaneous three times a day before meals  insulin lispro (HumaLOG) corrective regimen sliding scale   SubCutaneous at bedtime  insulin lispro Injectable (HumaLOG) 3 Unit(s) SubCutaneous three times a day before meals  isosorbide   mononitrate ER Tablet (IMDUR) 60 milliGRAM(s) Oral daily  magnesium oxide 400 milliGRAM(s) Oral daily  ondansetron Injectable 4 milliGRAM(s) IV Push every 6 hours PRN  pantoprazole    Tablet 40 milliGRAM(s) Oral before breakfast  polyethylene glycol 3350 17 Gram(s) Oral daily  predniSONE   Tablet   Oral   senna 2 Tablet(s) Oral at bedtime  torsemide 40 milliGRAM(s) Oral daily      PHYSICAL EXAM  General: well nourished, well developed, no acute distress  Neurology: alert and oriented x 3, nonfocal, no gross deficits  Respiratory: Clear, slightly diminished at the bases.   CV: regular rate and rhythm, normal S1, S2  Abdomen: soft, nontender, obese, nondistended, positive bowel sounds, last bowel movement 3/9/19  : Russo patent draining clear yellow urine  Extremities: warm, well perfused. no edema noted. + DP pulses.        I&O's Detail    10 Mar 2019 07:01  -  11 Mar 2019 07:00  --------------------------------------------------------  IN:    Oral Fluid: 480 mL  Total IN: 480 mL    OUT:    Chest Tube: 100 mL    Indwelling Catheter - Urethral: 1450 mL  Total OUT: 1550 mL    Total NET: -1070 mL          Weights:  Daily     Daily Weight in k.1 (11 Mar 2019 05:18)  Admit Wt: Drug Dosing Weight  Height (cm): 162.56 (10 Feb 2019 18:12)  Weight (kg): 95.3 (2019 06:15)  BMI (kg/m2): 36.1 (2019 06:15)  BSA (m2): 2 (2019 06:15)    LABS  03-11    145  |  97<L>  |  85.0<H>  ----------------------------<  168<H>  3.8   |  38.0<H>  |  1.42<H>    Ca    8.2<L>      11 Mar 2019 06:20  Mg     2.7                                        9.0    8.1   )-----------( 135      ( 11 Mar 2019 06:20 )             26.5                  CAPILLARY BLOOD GLUCOSE      POCT Blood Glucose.: 139 mg/dL (11 Mar 2019 08:07)  POCT Blood Glucose.: 308 mg/dL (10 Mar 2019 21:49)  POCT Blood Glucose.: 315 mg/dL (10 Mar 2019 17:07)  POCT Blood Glucose.: 225 mg/dL (10 Mar 2019 12:04)           Today's CXR:< from: Xray Chest 1 View- PORTABLE-Routine (19 @ 05:13) >  EXAM:  XR CHEST PORTABLE ROUTINE 1V                          PROCEDURE DATE:  2019          INTERPRETATION:  CHEST AP PORTABLE:    History: Cough.     Date and time of exam: 3/11/2019 4:49 AM.    Technique: A single AP view of the chest was obtained.    Comparison exam: 3/10/2019 5:12 AM.    Findings:  Improved aeration at the left lung base with decreasing left pleural   effusion and left basilar atelectasis. Persistent diffuse airspace   disease throughout the right lung, unchanged. Right pigtail drainage   catheter, unchanged. No evidence of pneumothorax..    Impression:  Improved aeration at the left lung base, otherwise stable.    < end of copied text >    PAST MEDICAL & SURGICAL HISTORY:  Herniated disc, cervical  PAD (peripheral artery disease)  Legally blind  History of peripheral vascular disease  History of retinal detachment  History of CEA (carotid endarterectomy): Right  Hyperlipidemia  Glaucoma  Hypertension  Diabetes  S/P CABG x 1  After cataract  Uveitic glaucoma of both eyes  Detached retina  Atherosclerosis of right carotid artery   delivery delivered

## 2019-03-11 NOTE — PROGRESS NOTE ADULT - SUBJECTIVE AND OBJECTIVE BOX
NEPHROLOGY INTERVAL HPI/OVERNIGHT EVENTS:    Examined earlier    MEDICATIONS  (STANDING):  ALBUTerol    0.083% 10 milliGRAM(s) Nebulizer once  ALBUTerol/ipratropium for Nebulization 3 milliLiter(s) Nebulizer every 6 hours  amLODIPine   Tablet 5 milliGRAM(s) Oral daily  ascorbic acid 500 milliGRAM(s) Oral daily  aspirin enteric coated 81 milliGRAM(s) Oral daily  atorvastatin 80 milliGRAM(s) Oral at bedtime  carvedilol 6.25 milliGRAM(s) Oral every 12 hours  dextrose 5%. 1000 milliLiter(s) (50 mL/Hr) IV Continuous <Continuous>  dextrose 50% Injectable 12.5 Gram(s) IV Push once  dextrose 50% Injectable 25 Gram(s) IV Push once  dextrose 50% Injectable 25 Gram(s) IV Push once  docusate sodium 100 milliGRAM(s) Oral two times a day  DULoxetine 60 milliGRAM(s) Oral daily  epoetin sara Injectable 93080 Unit(s) SubCutaneous <User Schedule>  gabapentin 100 milliGRAM(s) Oral three times a day  heparin  Injectable 5000 Unit(s) SubCutaneous every 12 hours  insulin glargine Injectable (LANTUS) 10 Unit(s) SubCutaneous at bedtime  insulin lispro (HumaLOG) corrective regimen sliding scale   SubCutaneous three times a day before meals  insulin lispro (HumaLOG) corrective regimen sliding scale   SubCutaneous at bedtime  insulin lispro Injectable (HumaLOG) 3 Unit(s) SubCutaneous three times a day before meals  isosorbide   mononitrate ER Tablet (IMDUR) 60 milliGRAM(s) Oral daily  magnesium oxide 400 milliGRAM(s) Oral daily  Nephro-riya 1 Tablet(s) Oral daily  pantoprazole    Tablet 40 milliGRAM(s) Oral before breakfast  polyethylene glycol 3350 17 Gram(s) Oral daily  predniSONE   Tablet   Oral   senna 2 Tablet(s) Oral at bedtime  torsemide 40 milliGRAM(s) Oral daily    MEDICATIONS  (PRN):  acetaminophen   Tablet .. 650 milliGRAM(s) Oral every 6 hours PRN Mild Pain (1 - 3)  dextrose 40% Gel 15 Gram(s) Oral once PRN Blood Glucose LESS THAN 70 milliGRAM(s)/deciliter  glucagon  Injectable 1 milliGRAM(s) IntraMuscular once PRN Glucose LESS THAN 70 milligrams/deciliter  glycopyrrolate Injectable 0.1 milliGRAM(s) IV Push every 8 hours PRN secreations  guaiFENesin    Syrup 100 milliGRAM(s) Oral every 6 hours PRN Cough  ondansetron Injectable 4 milliGRAM(s) IV Push every 6 hours PRN Nausea and/or Vomiting      Allergies    Accupril (Other)    Intolerances        Vital Signs Last 24 Hrs  T(C): 36.6 (11 Mar 2019 15:40), Max: 37.2 (11 Mar 2019 10:00)  T(F): 97.8 (11 Mar 2019 15:40), Max: 99 (11 Mar 2019 10:00)  HR: 67 (11 Mar 2019 15:41) (56 - 69)  BP: 140/65 (11 Mar 2019 15:40) (100/50 - 140/65)  BP(mean): --  RR: 18 (11 Mar 2019 15:40) (18 - 20)  SpO2: 96% (11 Mar 2019 15:41) (91% - 100%)  Daily     Daily Weight in k.1 (11 Mar 2019 05:18)    PHYSICAL EXAM:  Appears chronically ill  Cardiovascular: Normal S1 S2, No rub  Respiratory: Clear lungs with decreased BS at bases; R chest tube noted	  Neuro: A & O x 3; non focal  Gastrointestinal:  Soft, Non-tender, + BS	  Extremities: + LE edema  LABS:                        9.0    8.1   )-----------( 135      ( 11 Mar 2019 06:20 )             26.5     11    145  |  97<L>  |  85.0<H>  ----------------------------<  168<H>  3.8   |  38.0<H>  |  1.42<H>    Ca    8.2<L>      11 Mar 2019 06:20  Mg     2.7     11          Magnesium, Serum: 2.7 mg/dL ( @ 06:20)          RADIOLOGY & ADDITIONAL TESTS:

## 2019-03-11 NOTE — PROGRESS NOTE ADULT - ASSESSMENT
KEITH on CKD stage III in setting of decompensated CHF (R > L)  Cr dec cont to improve daily  Elevated BUN also due to steroids  1.1g proteinuria  - cont Torsemide ==> will need to tolerate azotemia  - monitor labs  - avoid potential nephrotoxins    Anemia: +CRF +GIB  s/p IV Fe==> Tsat=27%  - cont QUENTIN  - monitor H/H  -  target Hgb=10.0  - consider PRBCs to maximize O2 carrying capacity    Will follow

## 2019-03-11 NOTE — PROGRESS NOTE ADULT - PROBLEM SELECTOR PLAN 1
Right chest tube removed.  Post removal chest tube obtained.  Thoracic surgery to sign off, no other interventions at this time.  Discussed plan with Dr. Escudero & CTS in morning rounds.

## 2019-03-11 NOTE — PROGRESS NOTE ADULT - SUBJECTIVE AND OBJECTIVE BOX
CC: hypoxia    Patient seen and examined at the bedside. No acute overnight events. no events yesterday. Complaining of nothing this AM. Denies fever/chills, headache, lightheadedness, dizziness, chest pain, palpitations, cough, abd pain, nausea/vomiting/diarrhea, muscle pain.      =========================================================================================  T(C): 36.6 (19 @ 15:40), Max: 37.2 (19 @ 10:00)  HR: 67 (19 @ 15:41) (56 - 69)  BP: 140/65 (19 @ 15:40) (100/50 - 140/65)  RR: 18 (19 @ 15:40) (18 - 20)  SpO2: 96% (19 @ 15:41) (91% - 100%)    PHYSICAL EXAM.    GEN - appears age appropriate.obese. pleasant. no distress.   HEENT - NCAT, EOMI, GABBY  RESP - CTA BL, no wheeze/stridor/rhonchi/crackles. not on supplemental O2. able to speak in full sentences without distress.   CARDIO - NS1S2, RRR. No murmurs/rubs/gallops.  ABD - Soft/Non tender/Non distended. Normal BS x4 quadrants. no guarding/rebound tenderness.  Ext - No BISI.  MSK - BL 5/5 strength on upper and lower extremities.   Neuro - AAOx3. cn 2-12 grossly intact  Psych - normal affect  Skin - c/d/i. no rashes/lesions      I&O's Summary    10 Mar 2019 07:01  -  11 Mar 2019 07:00  --------------------------------------------------------  IN: 480 mL / OUT: 1550 mL / NET: -1070 mL    11 Mar 2019 07:01  -  11 Mar 2019 17:31  --------------------------------------------------------  IN: 240 mL / OUT: 800 mL / NET: -560 mL      Daily     Daily Weight in k.1 (11 Mar 2019 05:18)    =========================================================================================  LABS.    0311    145  |  97<L>  |  85.0<H>  ----------------------------<  168<H>  3.8   |  38.0<H>  |  1.42<H>    Ca    8.2<L>      11 Mar 2019 06:20  Mg     2.7     0311                            9.0    8.1   )-----------( 135      ( 11 Mar 2019 06:20 )             26.5                   =========================================================================================  IMAGING.     =========================================================================================    HOME MEDS.    Home Medications:  aspirin 81 mg oral tablet, chewable: 1 tab(s) orally once a day (05 Mar 2019 10:57)  atorvastatin 80 mg oral tablet: 1 tab(s) orally once a day (at bedtime) (05 Mar 2019 10:57)  carvedilol 25 mg oral tablet: 1 tab(s) orally every 12 hours (05 Mar 2019 10:57)  docusate sodium 100 mg oral capsule: 1 cap(s) orally 2 times a day (05 Mar 2019 10:57)  DULoxetine 60 mg oral delayed release capsule: 1 cap(s) orally once a day (05 Mar 2019 10:57)  ferrous sulfate 325 mg (65 mg elemental iron) oral tablet: 1 tab(s) orally 2 times a day (05 Mar 2019 10:57)  gabapentin 100 mg oral capsule: 1 cap(s) orally 3 times a day (05 Mar 2019 10:57)  isosorbide mononitrate 60 mg oral tablet, extended release: 1 tab(s) orally once a day (in the morning) (05 Mar 2019 10:57)  magnesium oxide 400 mg (240 mg elemental magnesium) oral tablet: 1 tab(s) orally once a day (05 Mar 2019 10:57)  melatonin 3 mg oral tablet: 1 tab(s) orally once a day (at bedtime), As needed, Insomnia (05 Mar 2019 10:57)  metFORMIN 850 mg oral tablet: orally 2 times a day (with meals) (05 Mar 2019 10:56)  pantoprazole 40 mg oral delayed release tablet: 1 tab(s) orally once a day (before a meal) (05 Mar 2019 10:57)      =========================================================================================    HOSPITAL MEDS.    MEDICATIONS  (STANDING):  ALBUTerol    0.083% 10 milliGRAM(s) Nebulizer once  ALBUTerol/ipratropium for Nebulization 3 milliLiter(s) Nebulizer every 6 hours  amLODIPine   Tablet 5 milliGRAM(s) Oral daily  aspirin enteric coated 81 milliGRAM(s) Oral daily  atorvastatin 80 milliGRAM(s) Oral at bedtime  carvedilol 6.25 milliGRAM(s) Oral every 12 hours  dextrose 5%. 1000 milliLiter(s) (50 mL/Hr) IV Continuous <Continuous>  dextrose 50% Injectable 12.5 Gram(s) IV Push once  dextrose 50% Injectable 25 Gram(s) IV Push once  dextrose 50% Injectable 25 Gram(s) IV Push once  docusate sodium 100 milliGRAM(s) Oral two times a day  DULoxetine 60 milliGRAM(s) Oral daily  epoetin sara Injectable 33590 Unit(s) SubCutaneous <User Schedule>  ertapenem  IVPB 1000 milliGRAM(s) IV Intermittent every 24 hours  gabapentin 100 milliGRAM(s) Oral three times a day  heparin  Injectable 5000 Unit(s) SubCutaneous every 12 hours  insulin glargine Injectable (LANTUS) 10 Unit(s) SubCutaneous at bedtime  insulin lispro (HumaLOG) corrective regimen sliding scale   SubCutaneous three times a day before meals  insulin lispro (HumaLOG) corrective regimen sliding scale   SubCutaneous at bedtime  insulin lispro Injectable (HumaLOG) 3 Unit(s) SubCutaneous three times a day before meals  isosorbide   mononitrate ER Tablet (IMDUR) 60 milliGRAM(s) Oral daily  magnesium oxide 400 milliGRAM(s) Oral daily  pantoprazole    Tablet 40 milliGRAM(s) Oral before breakfast  polyethylene glycol 3350 17 Gram(s) Oral daily  predniSONE   Tablet   Oral   senna 2 Tablet(s) Oral at bedtime  torsemide 40 milliGRAM(s) Oral daily    MEDICATIONS  (PRN):  acetaminophen   Tablet .. 650 milliGRAM(s) Oral every 6 hours PRN Mild Pain (1 - 3)  dextrose 40% Gel 15 Gram(s) Oral once PRN Blood Glucose LESS THAN 70 milliGRAM(s)/deciliter  glucagon  Injectable 1 milliGRAM(s) IntraMuscular once PRN Glucose LESS THAN 70 milligrams/deciliter  glycopyrrolate Injectable 0.1 milliGRAM(s) IV Push every 8 hours PRN secreations  guaiFENesin    Syrup 100 milliGRAM(s) Oral every 6 hours PRN Cough  ondansetron Injectable 4 milliGRAM(s) IV Push every 6 hours PRN Nausea and/or Vomiting

## 2019-03-11 NOTE — PROGRESS NOTE ADULT - ASSESSMENT
64 yo presenting to ED w/ SOB 2/2 diastolic HF exacerbation and noted to have KEITH and hyperkalemia, PMH CAD s/p CABG and PCI, PAD s/p baloon angioplasty, DM2, HTN, HLD, hx of cardiac arrest, recent GI bleed last month, Diastolic CHF, legally blind, pleural effusion requiring thoracentesis in Dec 2018, admitted for SOB, noted to have pleural effusion, started on bumex, s/p RRT noted on 3/1 for hypotension & lethargy. Pt became hypotensive after Bumex with albumin was given.  CXR with worsening plum edema b/l. BiPAP placed. ICU consult called for vasopressor support during IV diuresis. Midodrine ordered. She became increasingly lethargic with ABG showing resp acidosis with hypoxia. Pulmonary consult appreciated- started on IV steroids, feeling better, started on steroid taper.     A/p    >Acute on Chronic Hypercapnic Respiratory Failure due to OHS/KYREE  stable  Pulm recommend chronic NIV for chronic hypercarbic respiratory failure to improve symptoms and to decrease hospitalizations  c/w Bipap qhs  Eventual MICHELLE with NIV  c/w steroid taper   Avoid Narcotics for pain    > Acute on Chronic Diastolic Heart Failure with severe PHTN  & right ventricular failure from OHS/ KYREE complicated by pulm edema & pleural effusions  - stable, exac resolved  - sp chest tube w/ drainage, removed by CT sx on 3/12, uncomplicated  - c/w Torsemide  c/w coreg & Imdur  - losartan as below  - ECHO shows EF of > 75%. Moderate to Severe TR, mod-severe PHTN  - repeat CXR in am, if stable can consider dc to [facility]    >UTI - appreciate ID input, sp completed course of invanz, resolved    >Dysphagia - had a RR due to aspiration, speech eval appreciated - recommend regular diet with thin liquid.   aspiration precautions    >Chest pain with hx of CAD & CABG/PCI-  sp PRBC to improve oxygen to myocardium  c/w Imdur, BB, statin, ASA.   Plavix on hold since last admission due to GI Bleed.    > Lower GI Bleed likely diverticular-   - s/p 1 unit of PRBC, well tolerated, uncomplicated  - s/p Colonoscopy -~ Poor prep throughout the colon. Diverticulosis seen. Unable to visualize colon sufficiently  No further GI w/u recommended, ASA resumed as per GI, Plavix yet on hold, outpt fu    >Hyperkalemia  - resolved  - Holding Losartan, resume as needed for HF + bp control    > Acute on chronic kidney injury - resolved  appreciate renal input - - cont Torsemide ==> will need to tolerate azotemia  ct hold losartan   f/u BMP     > CAD complicated by stents in 2014 / PAD / HLD / HTN  - Continue, Imdur, Lipitor, Coreg and Norvasc   - Plavix on hold since last admission due to GI Bleed. ASA resumed as per GI, plavix on hold    > DM 2 not on long term insulin complicated by asymptomatic hyperglycemia  - HbA1c 5.6, goal < 8, controlled  - Lantus at 10 units at bedtime plus Humalog 3U   - c/w moderate sliding scale  - Steroids taper    > Anemia  - Multifactorial (chronic, complicated with acute gi bleeding during admit)  - s/p Venofer   - Continue Procrit  + stool for occult blood (will need GI work-up as outpt)  - Monitor CBC  - per renal goal hgb 10    > Transaminitis- resolved  - Likely secondary to liver congestion    > History of Depression  - chronic, stable, uncomplicated  - Continue Duloxetine 60 mg    >B/L UE swelling  - Arm elevation  - Warm compresses   - No DVT     >Obesity in setting of Acute Moderate Protein Calorie Malnutrition  bmi 36.1  Nepro TID  Neprovite and Vit C 500mg daily  daily wt    DVT Prophylaxis -- heparin sc    >Goals of care - palliative on board   DNR  prognosis guarded    >dispo. PT [facility] appreciated[facility]

## 2019-03-12 LAB
ANION GAP SERPL CALC-SCNC: 10 MMOL/L — SIGNIFICANT CHANGE UP (ref 5–17)
BUN SERPL-MCNC: 88 MG/DL — HIGH (ref 8–20)
CALCIUM SERPL-MCNC: 8 MG/DL — LOW (ref 8.6–10.2)
CHLORIDE SERPL-SCNC: 97 MMOL/L — LOW (ref 98–107)
CO2 SERPL-SCNC: 38 MMOL/L — HIGH (ref 22–29)
CREAT SERPL-MCNC: 1.39 MG/DL — HIGH (ref 0.5–1.3)
GLUCOSE BLDC GLUCOMTR-MCNC: 156 MG/DL — HIGH (ref 70–99)
GLUCOSE BLDC GLUCOMTR-MCNC: 256 MG/DL — HIGH (ref 70–99)
GLUCOSE BLDC GLUCOMTR-MCNC: 278 MG/DL — HIGH (ref 70–99)
GLUCOSE BLDC GLUCOMTR-MCNC: 294 MG/DL — HIGH (ref 70–99)
GLUCOSE SERPL-MCNC: 154 MG/DL — HIGH (ref 70–115)
HCT VFR BLD CALC: 26 % — LOW (ref 37–47)
HGB BLD-MCNC: 8.6 G/DL — LOW (ref 12–16)
MAGNESIUM SERPL-MCNC: 2.7 MG/DL — HIGH (ref 1.6–2.6)
MCHC RBC-ENTMCNC: 29.3 PG — SIGNIFICANT CHANGE UP (ref 27–31)
MCHC RBC-ENTMCNC: 33.1 G/DL — SIGNIFICANT CHANGE UP (ref 32–36)
MCV RBC AUTO: 88.4 FL — SIGNIFICANT CHANGE UP (ref 81–99)
PHOSPHATE SERPL-MCNC: 2.5 MG/DL — SIGNIFICANT CHANGE UP (ref 2.4–4.7)
PLATELET # BLD AUTO: 142 K/UL — LOW (ref 150–400)
POTASSIUM SERPL-MCNC: 3.9 MMOL/L — SIGNIFICANT CHANGE UP (ref 3.5–5.3)
POTASSIUM SERPL-SCNC: 3.9 MMOL/L — SIGNIFICANT CHANGE UP (ref 3.5–5.3)
RBC # BLD: 2.94 M/UL — LOW (ref 4.4–5.2)
RBC # FLD: 16.1 % — HIGH (ref 11–15.6)
SODIUM SERPL-SCNC: 145 MMOL/L — SIGNIFICANT CHANGE UP (ref 135–145)
WBC # BLD: 7.8 K/UL — SIGNIFICANT CHANGE UP (ref 4.8–10.8)
WBC # FLD AUTO: 7.8 K/UL — SIGNIFICANT CHANGE UP (ref 4.8–10.8)

## 2019-03-12 PROCEDURE — 71250 CT THORAX DX C-: CPT | Mod: 26

## 2019-03-12 PROCEDURE — 99233 SBSQ HOSP IP/OBS HIGH 50: CPT

## 2019-03-12 PROCEDURE — 99232 SBSQ HOSP IP/OBS MODERATE 35: CPT

## 2019-03-12 RX ADMIN — Medication 650 MILLIGRAM(S): at 10:40

## 2019-03-12 RX ADMIN — Medication 40 MILLIGRAM(S): at 06:15

## 2019-03-12 RX ADMIN — Medication 3 MILLILITER(S): at 21:01

## 2019-03-12 RX ADMIN — Medication 6: at 12:12

## 2019-03-12 RX ADMIN — CARVEDILOL PHOSPHATE 6.25 MILLIGRAM(S): 80 CAPSULE, EXTENDED RELEASE ORAL at 17:49

## 2019-03-12 RX ADMIN — Medication 2: at 08:51

## 2019-03-12 RX ADMIN — Medication 20 MILLIGRAM(S): at 06:15

## 2019-03-12 RX ADMIN — Medication 3 UNIT(S): at 12:12

## 2019-03-12 RX ADMIN — Medication 3 MILLILITER(S): at 02:40

## 2019-03-12 RX ADMIN — Medication 2: at 21:42

## 2019-03-12 RX ADMIN — SENNA PLUS 2 TABLET(S): 8.6 TABLET ORAL at 21:42

## 2019-03-12 RX ADMIN — Medication 6: at 17:51

## 2019-03-12 RX ADMIN — PANTOPRAZOLE SODIUM 40 MILLIGRAM(S): 20 TABLET, DELAYED RELEASE ORAL at 06:14

## 2019-03-12 RX ADMIN — Medication 100 MILLIGRAM(S): at 17:50

## 2019-03-12 RX ADMIN — GABAPENTIN 100 MILLIGRAM(S): 400 CAPSULE ORAL at 17:49

## 2019-03-12 RX ADMIN — CARVEDILOL PHOSPHATE 6.25 MILLIGRAM(S): 80 CAPSULE, EXTENDED RELEASE ORAL at 06:14

## 2019-03-12 RX ADMIN — Medication 3 UNIT(S): at 17:51

## 2019-03-12 RX ADMIN — Medication 100 MILLIGRAM(S): at 06:14

## 2019-03-12 RX ADMIN — Medication 650 MILLIGRAM(S): at 10:10

## 2019-03-12 RX ADMIN — Medication 1 TABLET(S): at 12:10

## 2019-03-12 RX ADMIN — HEPARIN SODIUM 5000 UNIT(S): 5000 INJECTION INTRAVENOUS; SUBCUTANEOUS at 12:10

## 2019-03-12 RX ADMIN — ISOSORBIDE MONONITRATE 60 MILLIGRAM(S): 60 TABLET, EXTENDED RELEASE ORAL at 12:10

## 2019-03-12 RX ADMIN — GABAPENTIN 100 MILLIGRAM(S): 400 CAPSULE ORAL at 06:14

## 2019-03-12 RX ADMIN — Medication 81 MILLIGRAM(S): at 12:10

## 2019-03-12 RX ADMIN — ATORVASTATIN CALCIUM 80 MILLIGRAM(S): 80 TABLET, FILM COATED ORAL at 21:42

## 2019-03-12 RX ADMIN — Medication 3 MILLILITER(S): at 10:11

## 2019-03-12 RX ADMIN — Medication 3 MILLILITER(S): at 16:15

## 2019-03-12 RX ADMIN — MAGNESIUM OXIDE 400 MG ORAL TABLET 400 MILLIGRAM(S): 241.3 TABLET ORAL at 12:11

## 2019-03-12 RX ADMIN — Medication 500 MILLIGRAM(S): at 12:10

## 2019-03-12 RX ADMIN — Medication 3 UNIT(S): at 08:50

## 2019-03-12 RX ADMIN — DULOXETINE HYDROCHLORIDE 60 MILLIGRAM(S): 30 CAPSULE, DELAYED RELEASE ORAL at 12:11

## 2019-03-12 RX ADMIN — INSULIN GLARGINE 10 UNIT(S): 100 INJECTION, SOLUTION SUBCUTANEOUS at 21:43

## 2019-03-12 RX ADMIN — GABAPENTIN 100 MILLIGRAM(S): 400 CAPSULE ORAL at 21:42

## 2019-03-12 NOTE — PROGRESS NOTE ADULT - SUBJECTIVE AND OBJECTIVE BOX
CC: hypoxia    Patient seen and examined at the bedside. No acute overnight events. no events yesterday. Complaining of nothing this AM but later during the afternoon noted to have ep of large amount of hemoptysis. Denies fever/chills, headache, lightheadedness, dizziness, chest pain, palpitations, cough, abd pain, nausea/vomiting/diarrhea, muscle pain.      =========================================================================================    PHYSICAL EXAM.    GEN - appears age appropriate. obese. pleasant. no distress.   HEENT - NCAT, EOMI, GABBY  RESP - course breath sounds sec to supplemetal o2. no wheeze/stridor. on supplemental O2. able to speak in full sentences without distress.   CARDIO - NS1S2, RRR. No murmurs/rubs/gallops.  ABD - Soft/Non tender/Non distended. Normal BS x4 quadrants. no guarding/rebound tenderness.  Ext - No BISI.  Psych - normal affect  Skin - c/d/i. no rashes/lesions      VITAL SIGNS.    Vital Signs Last 24 Hrs  T(C): 36.8 (12 Mar 2019 16:02), Max: 36.8 (12 Mar 2019 16:02)  T(F): 98.2 (12 Mar 2019 16:02), Max: 98.2 (12 Mar 2019 16:02)  HR: 65 (12 Mar 2019 18:21) (61 - 82)  BP: 120/67 (12 Mar 2019 18:21) (106/60 - 130/63)  BP(mean): --  RR: 20 (12 Mar 2019 18:21) (18 - 20)  SpO2: 96% (12 Mar 2019 18:21) (90% - 98%)        =================================================    LABS.                          8.6    7.8   )-----------( 142      ( 12 Mar 2019 05:42 )             26.0     03-12    145  |  97<L>  |  88.0<H>  ----------------------------<  154<H>  3.9   |  38.0<H>  |  1.39<H>    Ca    8.0<L>      12 Mar 2019 05:42  Phos  2.5     03-12  Mg     2.7     03-12          I&O's Summary    11 Mar 2019 07:01  -  12 Mar 2019 07:00  --------------------------------------------------------  IN: 240 mL / OUT: 1700 mL / NET: -1460 mL    12 Mar 2019 07:01  -  12 Mar 2019 19:08  --------------------------------------------------------  IN: 480 mL / OUT: 850 mL / NET: -370 mL          ================================================    IMAGING.      ================================================    HOME MEDS.    Home Medications:  albuterol 2.5 mg/3 mL (0.083%) inhalation solution: 2.5 milligram(s) inhaled every 4 hours, As Needed sob or wheeze (11 Mar 2019 17:51)  amLODIPine 5 mg oral tablet: 1 tab(s) orally once a day (11 Mar 2019 17:51)  ascorbic acid 500 mg oral tablet: 1 tab(s) orally once a day (11 Mar 2019 17:51)  Aspi-Cor 81 mg oral delayed release tablet: 1 tab(s) orally once a day (11 Mar 2019 17:51)  aspirin 81 mg oral tablet, chewable: 1 tab(s) orally once a day (05 Mar 2019 10:57)  atorvastatin 80 mg oral tablet: 1 tab(s) orally once a day (at bedtime) (05 Mar 2019 10:57)  carvedilol 25 mg oral tablet: 1 tab(s) orally every 12 hours (05 Mar 2019 10:57)  Colace 100 mg oral capsule: 1 cap(s) orally 2 times a day (11 Mar 2019 17:51)  Coreg 6.25 mg oral tablet: 1 tab(s) orally every 12 hours (11 Mar 2019 17:51)  Cymbalta 60 mg oral delayed release capsule: 1 cap(s) orally once a day (11 Mar 2019 17:51)  docusate sodium 100 mg oral capsule: 1 cap(s) orally 2 times a day (05 Mar 2019 10:57)  DULoxetine 60 mg oral delayed release capsule: 1 cap(s) orally once a day (05 Mar 2019 10:57)  ferrous sulfate 325 mg (65 mg elemental iron) oral tablet: 1 tab(s) orally 2 times a day (05 Mar 2019 10:57)  gabapentin 100 mg oral capsule: 1 cap(s) orally 3 times a day (05 Mar 2019 10:57)  guaiFENesin 100 mg/5 mL oral liquid: 5 milliliter(s) orally every 6 hours, As needed, Cough (11 Mar 2019 17:51)  ipratropium-albuterol 0.5 mg-2.5 mg/3 mLinhalation solution: 3 milliliter(s) inhaled every 6 hours (11 Mar 2019 17:51)  isosorbide mononitrate 60 mg oral tablet, extended release: 1 tab(s) orally once a day (in the morning) (05 Mar 2019 10:57)  isosorbide mononitrate 60 mg oral tablet, extended release: 1 tab(s) orally once a day (11 Mar 2019 17:51)  Lipitor 80 mg oral tablet: 1 tab(s) orally once a day (at bedtime) (11 Mar 2019 17:51)  Mag-Ox 400 oral tablet: 1 tab(s) orally once a day (11 Mar 2019 17:51)  magnesium oxide 400 mg (240 mg elemental magnesium) oral tablet: 1 tab(s) orally once a day (05 Mar 2019 10:57)  melatonin 3 mg oral tablet: 1 tab(s) orally once a day (at bedtime), As needed, Insomnia (05 Mar 2019 10:57)  metFORMIN 850 mg oral tablet: orally 2 times a day (with meals) (05 Mar 2019 10:56)  multivitamin: 1 tab(s) orally once a day (11 Mar 2019 17:51)  Neurontin 100 mg oral capsule: 1 cap(s) orally 3 times a day (11 Mar 2019 17:51)  pantoprazole 40 mg oral delayed release tablet: 1 tab(s) orally once a day (before a meal) (05 Mar 2019 10:57)  polyethylene glycol 3350 oral powder for reconstitution: 17 gram(s) orally once a day (11 Mar 2019 17:51)  Protonix 40 mg oral delayed release tablet: 1 tab(s) orally once a day (before a meal) (11 Mar 2019 17:51)  senna oral tablet: 2 tab(s) orally once a day (at bedtime) (11 Mar 2019 17:51)  torsemide 20 mg oral tablet: 2 tab(s) orally once a day (11 Mar 2019 17:51)      ================================================    HOSPITAL MEDS.    MEDICATIONS  (STANDING):  ALBUTerol    0.083% 10 milliGRAM(s) Nebulizer once  ALBUTerol/ipratropium for Nebulization 3 milliLiter(s) Nebulizer every 6 hours  amLODIPine   Tablet 5 milliGRAM(s) Oral daily  ascorbic acid 500 milliGRAM(s) Oral daily  aspirin enteric coated 81 milliGRAM(s) Oral daily  atorvastatin 80 milliGRAM(s) Oral at bedtime  carvedilol 6.25 milliGRAM(s) Oral every 12 hours  dextrose 5%. 1000 milliLiter(s) (50 mL/Hr) IV Continuous <Continuous>  dextrose 50% Injectable 12.5 Gram(s) IV Push once  dextrose 50% Injectable 25 Gram(s) IV Push once  dextrose 50% Injectable 25 Gram(s) IV Push once  docusate sodium 100 milliGRAM(s) Oral two times a day  DULoxetine 60 milliGRAM(s) Oral daily  epoetin sara Injectable 89438 Unit(s) SubCutaneous <User Schedule>  gabapentin 100 milliGRAM(s) Oral three times a day  heparin  Injectable 5000 Unit(s) SubCutaneous every 12 hours  insulin glargine Injectable (LANTUS) 10 Unit(s) SubCutaneous at bedtime  insulin lispro (HumaLOG) corrective regimen sliding scale   SubCutaneous three times a day before meals  insulin lispro (HumaLOG) corrective regimen sliding scale   SubCutaneous at bedtime  insulin lispro Injectable (HumaLOG) 3 Unit(s) SubCutaneous three times a day before meals  isosorbide   mononitrate ER Tablet (IMDUR) 60 milliGRAM(s) Oral daily  magnesium oxide 400 milliGRAM(s) Oral daily  Nephro-riya 1 Tablet(s) Oral daily  pantoprazole    Tablet 40 milliGRAM(s) Oral before breakfast  polyethylene glycol 3350 17 Gram(s) Oral daily  predniSONE   Tablet 20 milliGRAM(s) Oral daily  predniSONE   Tablet   Oral   senna 2 Tablet(s) Oral at bedtime  torsemide 40 milliGRAM(s) Oral daily    MEDICATIONS  (PRN):  acetaminophen   Tablet .. 650 milliGRAM(s) Oral every 6 hours PRN Mild Pain (1 - 3)  dextrose 40% Gel 15 Gram(s) Oral once PRN Blood Glucose LESS THAN 70 milliGRAM(s)/deciliter  glucagon  Injectable 1 milliGRAM(s) IntraMuscular once PRN Glucose LESS THAN 70 milligrams/deciliter  glycopyrrolate Injectable 0.1 milliGRAM(s) IV Push every 8 hours PRN secreations  guaiFENesin    Syrup 100 milliGRAM(s) Oral every 6 hours PRN Cough  ondansetron Injectable 4 milliGRAM(s) IV Push every 6 hours PRN Nausea and/or Vomiting

## 2019-03-12 NOTE — PROGRESS NOTE ADULT - ASSESSMENT
KEITH on CKD stage III in setting of decompensated CHF (R > L)  Cr dec cont to improve daily  Elevated BUN also due to steroids  1.1g proteinuria  cont Torsemide ==> will need to tolerate azotemia    Anemia: +CKD +GIB  s/p IV Fe==> Tsat=27%  - cont QUENTIN  - monitor H/H    Will follow

## 2019-03-12 NOTE — PROGRESS NOTE ADULT - ASSESSMENT
Assess    Acute on Chronic Diastolic HF with severe PHTN and RV failure from OHS/KYREE with pleural effusions  KEITH on CKD III  DNR    Rec    Continue Rx  Consider pleurex catheter   Toward SINGH

## 2019-03-12 NOTE — PROGRESS NOTE ADULT - ASSESSMENT
62 yo presenting to ED w/ SOB 2/2 diastolic HF exacerbation and noted to have KEITH and hyperkalemia, PMH CAD s/p CABG and PCI, PAD s/p baloon angioplasty, DM2, HTN, HLD, hx of cardiac arrest, recent GI bleed last month, Diastolic CHF, legally blind, pleural effusion requiring thoracentesis in Dec 2018, admitted for SOB, noted to have pleural effusion, started on bumex, s/p RRT noted on 3/1 for hypotension & lethargy. Pt became hypotensive after Bumex with albumin was given.  CXR with worsening plum edema b/l. BiPAP placed. ICU consult called for vasopressor support during IV diuresis. Midodrine ordered. She became increasingly lethargic with ABG showing resp acidosis with hypoxia. Pulmonary consult appreciated    course complicated by hemoptysis    A/p    > Hemoptysis  per pt, several day hx, reported by pt for the first time on 3/12  CT chest 3/8 reviewed, @ that time pt noted to have some development of BL groundglass opacities but no clear adenopathy, nodules, cavitations, granulomas  ? related to superficial lac in trachea from use of around the clock o2  h/h stable  hold asa + dvt ppx for now  serial h/h  repeat ct chest, avoid contrast given recent keith to avoid recurrence, will reconsider use if absolutely needed  pulm cs called    > Acute on Chronic Hypercapnic Respiratory Failure due to OHS/KYREE  stable  Pulm recommend chronic NIV for chronic hypercarbic respiratory failure to improve symptoms and to decrease hospitalizations  c/w Bipap qhs  Eventual SINGH with NIV  c/w steroid taper, last day 3/16  Avoid Narcotics for pain    > Acute on Chronic Diastolic Heart Failure with severe PHTN  & right ventricular failure from OHS/ KYREE complicated by pulm edema & pleural effusions  - stable, exac resolved  - sp chest tube w/ drainage, removed by CT sx on 3/12, uncomplicated  - c/w Torsemide  c/w coreg & Imdur  - losartan as below  - ECHO shows EF of > 75%. Moderate to Severe TR, mod-severe PHTN    > RT PTX  - small lesion, noted on CT  - sp chest tube placement + removal  - resolved    >UTI - appreciate ID input, sp completed course of invanz, resolved    >Dysphagia - had a RR due to aspiration, speech eval appreciated - recommend regular diet with thin liquid.   aspiration precautions    >Chest pain with hx of CAD & CABG/PCI-  sp PRBC to improve oxygen to myocardium  c/w Imdur, BB, statin  ASA as above   Plavix on hold since last admission due to GI Bleed.    > Lower GI Bleed likely diverticular-   - s/p 1 unit of PRBC, well tolerated, uncomplicated  - s/p Colonoscopy - Poor prep throughout the colon. Diverticulosis seen. Unable to visualize colon sufficiently  No further GI w/u recommended,   cleared by GI to use asa but current use as above  Plavix yet on hold, outpt fu    >Hyperkalemia  - resolved  - Holding Losartan, resume as needed for HF + bp control    > Acute on chronic kidney injury - resolved  appreciate renal input - - cont Torsemide ==> will need to tolerate azotemia  ct hold losartan   f/u BMP     > CAD complicated by stents in 2014 / PAD / HLD / HTN  - Continue, Imdur, Lipitor, Coreg and Norvasc   - Plavix on hold since last admission due to GI Bleed.     > DM 2 not on long term insulin complicated by asymptomatic hyperglycemia  - HbA1c 5.6, goal < 8, controlled  - Lantus at 10 units at bedtime plus Humalog 3U   - c/w moderate sliding scale    > Anemia  - Multifactorial (chronic, complicated with acute gi bleeding during admit)  - s/p Venofer   - Continue Procrit  + stool for occult blood (will need GI work-up as outpt)  - Monitor CBC  - per renal goal hgb 10    > Transaminitis- resolved  - Likely secondary to liver congestion    > History of Depression  - chronic, stable, uncomplicated  - Continue Duloxetine 60 mg    >B/L UE swelling  - Arm elevation  - Warm compresses   - No DVT     >Obesity in setting of Acute Moderate Protein Calorie Malnutrition  bmi 36.1  Nepro TID  Neprovite and Vit C 500mg daily  daily wt    DVT Prophylaxis -- hold in light of hemoptysis, may resume depending on outcome of w/u    >Goals of care - palliative on board   DNR  prognosis guarded    >dispo. PT eval appreciated, transfer to [facility] when medically stable after w/u of hemoptysis completed. pt in agreeance for transfer

## 2019-03-12 NOTE — PROGRESS NOTE ADULT - SUBJECTIVE AND OBJECTIVE BOX
NEPHROLOGY INTERVAL HPI/OVERNIGHT EVENTS:    Examined earlier    MEDICATIONS  (STANDING):  ALBUTerol    0.083% 10 milliGRAM(s) Nebulizer once  ALBUTerol/ipratropium for Nebulization 3 milliLiter(s) Nebulizer every 6 hours  amLODIPine   Tablet 5 milliGRAM(s) Oral daily  ascorbic acid 500 milliGRAM(s) Oral daily  aspirin enteric coated 81 milliGRAM(s) Oral daily  atorvastatin 80 milliGRAM(s) Oral at bedtime  carvedilol 6.25 milliGRAM(s) Oral every 12 hours  dextrose 5%. 1000 milliLiter(s) (50 mL/Hr) IV Continuous <Continuous>  dextrose 50% Injectable 12.5 Gram(s) IV Push once  dextrose 50% Injectable 25 Gram(s) IV Push once  dextrose 50% Injectable 25 Gram(s) IV Push once  docusate sodium 100 milliGRAM(s) Oral two times a day  DULoxetine 60 milliGRAM(s) Oral daily  epoetin sara Injectable 69826 Unit(s) SubCutaneous <User Schedule>  gabapentin 100 milliGRAM(s) Oral three times a day  heparin  Injectable 5000 Unit(s) SubCutaneous every 12 hours  insulin glargine Injectable (LANTUS) 10 Unit(s) SubCutaneous at bedtime  insulin lispro (HumaLOG) corrective regimen sliding scale   SubCutaneous three times a day before meals  insulin lispro (HumaLOG) corrective regimen sliding scale   SubCutaneous at bedtime  insulin lispro Injectable (HumaLOG) 3 Unit(s) SubCutaneous three times a day before meals  isosorbide   mononitrate ER Tablet (IMDUR) 60 milliGRAM(s) Oral daily  magnesium oxide 400 milliGRAM(s) Oral daily  Nephro-riya 1 Tablet(s) Oral daily  pantoprazole    Tablet 40 milliGRAM(s) Oral before breakfast  polyethylene glycol 3350 17 Gram(s) Oral daily  predniSONE   Tablet 20 milliGRAM(s) Oral daily  predniSONE   Tablet   Oral   senna 2 Tablet(s) Oral at bedtime  torsemide 40 milliGRAM(s) Oral daily    MEDICATIONS  (PRN):  acetaminophen   Tablet .. 650 milliGRAM(s) Oral every 6 hours PRN Mild Pain (1 - 3)  dextrose 40% Gel 15 Gram(s) Oral once PRN Blood Glucose LESS THAN 70 milliGRAM(s)/deciliter  glucagon  Injectable 1 milliGRAM(s) IntraMuscular once PRN Glucose LESS THAN 70 milligrams/deciliter  glycopyrrolate Injectable 0.1 milliGRAM(s) IV Push every 8 hours PRN secreations  guaiFENesin    Syrup 100 milliGRAM(s) Oral every 6 hours PRN Cough  ondansetron Injectable 4 milliGRAM(s) IV Push every 6 hours PRN Nausea and/or Vomiting      Allergies    Accupril (Other)    Intolerances        Vital Signs Last 24 Hrs  T(C): 36.7 (12 Mar 2019 09:51), Max: 36.7 (12 Mar 2019 09:51)  T(F): 98 (12 Mar 2019 09:51), Max: 98 (12 Mar 2019 09:51)  HR: 82 (12 Mar 2019 10:15) (61 - 82)  BP: 118/57 (12 Mar 2019 09:51) (106/60 - 130/63)  BP(mean): --  RR: 19 (12 Mar 2019 09:51) (18 - 20)  SpO2: 90% (12 Mar 2019 10:15) (90% - 98%)  Daily     Daily Weight in k (12 Mar 2019 05:18)    PHYSICAL EXAM:  Appears chronically ill  Cardiovascular: Normal S1 S2, No rub  Respiratory: Clear lungs with decreased BS at bases	  Neuro: A & O x 3; non focal  Gastrointestinal:  Soft, Non-tender, + BS	  Extremities: + LE edema    LABS:                        8.6    7.8   )-----------( 142      ( 12 Mar 2019 05:42 )             26.0     03-12    145  |  97<L>  |  88.0<H>  ----------------------------<  154<H>  3.9   |  38.0<H>  |  1.39<H>    Ca    8.0<L>      12 Mar 2019 05:42  Phos  2.5     03-12  Mg     2.7     12          Magnesium, Serum: 2.7 mg/dL ( @ 05:42)  Phosphorus Level, Serum: 2.5 mg/dL ( @ 05:42)          RADIOLOGY & ADDITIONAL TESTS:

## 2019-03-12 NOTE — PROGRESS NOTE ADULT - SUBJECTIVE AND OBJECTIVE BOX
PULMONARY PROGRESS NOTE      RANJIT ROCHA-[id number]    Patient is a 63y old  Female who presents with a chief complaint of SOB (11 Mar 2019 08:29)  64 y/o woman was admitted with SOB on  to [facility]. She has diastolic HF, KEITH, HTN, DM2. CAD s/p CABG, PAD and persistent pleural effusion s/p chest tube      INTERVAL HPI/OVERNIGHT EVENTS:    No interval change  Comfortable - eating breakfast on NC02    MEDICATIONS  (STANDING):  ALBUTerol    0.083% 10 milliGRAM(s) Nebulizer once  ALBUTerol/ipratropium for Nebulization 3 milliLiter(s) Nebulizer every 6 hours  amLODIPine   Tablet 5 milliGRAM(s) Oral daily  aspirin enteric coated 81 milliGRAM(s) Oral daily  atorvastatin 80 milliGRAM(s) Oral at bedtime  carvedilol 6.25 milliGRAM(s) Oral every 12 hours  dextrose 5%. 1000 milliLiter(s) (50 mL/Hr) IV Continuous <Continuous>  dextrose 50% Injectable 12.5 Gram(s) IV Push once  dextrose 50% Injectable 25 Gram(s) IV Push once  dextrose 50% Injectable 25 Gram(s) IV Push once  docusate sodium 100 milliGRAM(s) Oral two times a day  DULoxetine 60 milliGRAM(s) Oral daily  epoetin sara Injectable 12877 Unit(s) SubCutaneous <User Schedule>  ertapenem  IVPB 1000 milliGRAM(s) IV Intermittent every 24 hours  gabapentin 100 milliGRAM(s) Oral three times a day  heparin  Injectable 5000 Unit(s) SubCutaneous every 12 hours  insulin glargine Injectable (LANTUS) 10 Unit(s) SubCutaneous at bedtime  insulin lispro (HumaLOG) corrective regimen sliding scale   SubCutaneous three times a day before meals  insulin lispro (HumaLOG) corrective regimen sliding scale   SubCutaneous at bedtime  insulin lispro Injectable (HumaLOG) 3 Unit(s) SubCutaneous three times a day before meals  isosorbide   mononitrate ER Tablet (IMDUR) 60 milliGRAM(s) Oral daily  magnesium oxide 400 milliGRAM(s) Oral daily  pantoprazole    Tablet 40 milliGRAM(s) Oral before breakfast  polyethylene glycol 3350 17 Gram(s) Oral daily  predniSONE   Tablet   Oral   senna 2 Tablet(s) Oral at bedtime  torsemide 40 milliGRAM(s) Oral daily      MEDICATIONS  (PRN):  acetaminophen   Tablet .. 650 milliGRAM(s) Oral every 6 hours PRN Mild Pain (1 - 3)  dextrose 40% Gel 15 Gram(s) Oral once PRN Blood Glucose LESS THAN 70 milliGRAM(s)/deciliter  glucagon  Injectable 1 milliGRAM(s) IntraMuscular once PRN Glucose LESS THAN 70 milligrams/deciliter  glycopyrrolate Injectable 0.1 milliGRAM(s) IV Push every 8 hours PRN secreations  guaiFENesin    Syrup 100 milliGRAM(s) Oral every 6 hours PRN Cough  ondansetron Injectable 4 milliGRAM(s) IV Push every 6 hours PRN Nausea and/or Vomiting      Allergies    Accupril (Other)    Intolerances        PAST MEDICAL & SURGICAL HISTORY:  Herniated disc, cervical  PAD (peripheral artery disease)  Legally blind  History of peripheral vascular disease  History of retinal detachment  History of CEA (carotid endarterectomy): Right  Hyperlipidemia  Glaucoma  Hypertension  Diabetes  S/P CABG x 1  After cataract  Uveitic glaucoma of both eyes  Detached retina  Atherosclerosis of right carotid artery   delivery delivered      SOCIAL HISTORY  Smoking History:       REVIEW OF SYSTEMS:    CONSTITUTIONAL:  No distress    HEENT:  Eyes:  No diplopia or blurred vision. ENT:  No earache, sore throat or runny nose.    CARDIOVASCULAR:  No pressure, squeezing, tightness, heaviness or aching about the chest; no palpitations.    RESPIRATORY:  No cough, shortness of breath, PND or orthopnea. Mild SOBOE    GASTROINTESTINAL:  No nausea, vomiting or diarrhea.    GENITOURINARY:  No dysuria, frequency or urgency.    NEUROLOGIC:  No paresthesias, fasciculations, seizures or weakness.    PSYCHIATRIC:  No disorder of thought or mood.    Vital Signs Last 24 Hrs  T(C): 37.2 (11 Mar 2019 10:00), Max: 37.2 (11 Mar 2019 10:00)  T(F): 99 (11 Mar 2019 10:00), Max: 99 (11 Mar 2019 10:00)  HR: 60 (11 Mar 2019 10:00) (56 - 67)  BP: 112/71 (11 Mar 2019 10:00) (100/50 - 130/54)  BP(mean): --  RR: 18 (11 Mar 2019 10:00) (18 - 22)  SpO2: 91% (11 Mar 2019 09:46) (91% - 100%)    PHYSICAL EXAMINATION:    GENERAL: The patient is awake and alert in no apparent distress.     HEENT: Head is normocephalic and atraumatic. Extraocular muscles are intact. Mucous membranes are moist.    NECK: Supple.    LUNGS: Clear to auscultation without wheezing, rales or rhonchi; respirations unlabored    HEART: Regular rate and rhythm without murmur.    ABDOMEN: Soft, nontender, and nondistended.      EXTREMITIES: Without any cyanosis, clubbing, rash, lesions or edema.    NEUROLOGIC: Grossly intact.    LABS:                        9.0    8.1   )-----------( 135      ( 11 Mar 2019 06:20 )             26.5     03-    145  |  97<L>  |  85.0<H>  ----------------------------<  168<H>  3.8   |  38.0<H>  |  1.42<H>    Ca    8.2<L>      11 Mar 2019 06:20  Mg     2.7     -          RADIOLOGY & ADDITIONAL STUDIES:    CXR on 3/11/19  Findings:  Improved aeration at the left lung base with decreasing left pleural   effusion and left basilar atelectasis. Persistent diffuse airspace   disease throughout the right lung, unchanged. Right pigtail drainage   catheter, unchanged. No evidence of pneumothorax..    Impression:  Improved aeration at the left lung base, otherwise stable.    TIA JOHNSTON M.D., ATTENDING RADIOLOGIST  This document has been electronically signed. Mar 11 2019  8:27AM

## 2019-03-13 LAB
ANION GAP SERPL CALC-SCNC: 8 MMOL/L — SIGNIFICANT CHANGE UP (ref 5–17)
BUN SERPL-MCNC: 92 MG/DL — HIGH (ref 8–20)
CALCIUM SERPL-MCNC: 8.4 MG/DL — LOW (ref 8.6–10.2)
CHLORIDE SERPL-SCNC: 96 MMOL/L — LOW (ref 98–107)
CO2 SERPL-SCNC: 38 MMOL/L — HIGH (ref 22–29)
CREAT SERPL-MCNC: 1.5 MG/DL — HIGH (ref 0.5–1.3)
GLUCOSE BLDC GLUCOMTR-MCNC: 193 MG/DL — HIGH (ref 70–99)
GLUCOSE BLDC GLUCOMTR-MCNC: 270 MG/DL — HIGH (ref 70–99)
GLUCOSE BLDC GLUCOMTR-MCNC: 339 MG/DL — HIGH (ref 70–99)
GLUCOSE BLDC GLUCOMTR-MCNC: 349 MG/DL — HIGH (ref 70–99)
GLUCOSE SERPL-MCNC: 185 MG/DL — HIGH (ref 70–115)
HCT VFR BLD CALC: 26.5 % — LOW (ref 37–47)
HGB BLD-MCNC: 8.7 G/DL — LOW (ref 12–16)
INR BLD: 1.17 RATIO — HIGH (ref 0.88–1.16)
MAGNESIUM SERPL-MCNC: 2.5 MG/DL — SIGNIFICANT CHANGE UP (ref 1.6–2.6)
MCHC RBC-ENTMCNC: 29.5 PG — SIGNIFICANT CHANGE UP (ref 27–31)
MCHC RBC-ENTMCNC: 32.8 G/DL — SIGNIFICANT CHANGE UP (ref 32–36)
MCV RBC AUTO: 89.8 FL — SIGNIFICANT CHANGE UP (ref 81–99)
NT-PROBNP SERPL-SCNC: HIGH PG/ML (ref 0–300)
PHOSPHATE SERPL-MCNC: 2.4 MG/DL — SIGNIFICANT CHANGE UP (ref 2.4–4.7)
PLATELET # BLD AUTO: 139 K/UL — LOW (ref 150–400)
POTASSIUM SERPL-MCNC: 4.2 MMOL/L — SIGNIFICANT CHANGE UP (ref 3.5–5.3)
POTASSIUM SERPL-SCNC: 4.2 MMOL/L — SIGNIFICANT CHANGE UP (ref 3.5–5.3)
PROCALCITONIN SERPL-MCNC: 0.21 NG/ML — HIGH (ref 0–0.04)
PROTHROM AB SERPL-ACNC: 13.5 SEC — HIGH (ref 10–12.9)
RBC # BLD: 2.95 M/UL — LOW (ref 4.4–5.2)
RBC # FLD: 16.4 % — HIGH (ref 11–15.6)
SODIUM SERPL-SCNC: 142 MMOL/L — SIGNIFICANT CHANGE UP (ref 135–145)
WBC # BLD: 8.4 K/UL — SIGNIFICANT CHANGE UP (ref 4.8–10.8)
WBC # FLD AUTO: 8.4 K/UL — SIGNIFICANT CHANGE UP (ref 4.8–10.8)

## 2019-03-13 PROCEDURE — 99232 SBSQ HOSP IP/OBS MODERATE 35: CPT

## 2019-03-13 PROCEDURE — 99233 SBSQ HOSP IP/OBS HIGH 50: CPT

## 2019-03-13 RX ORDER — ENOXAPARIN SODIUM 100 MG/ML
40 INJECTION SUBCUTANEOUS DAILY
Qty: 0 | Refills: 0 | Status: DISCONTINUED | OUTPATIENT
Start: 2019-03-13 | End: 2019-03-26

## 2019-03-13 RX ORDER — FUROSEMIDE 40 MG
60 TABLET ORAL EVERY 12 HOURS
Qty: 0 | Refills: 0 | Status: DISCONTINUED | OUTPATIENT
Start: 2019-03-13 | End: 2019-03-21

## 2019-03-13 RX ORDER — ASPIRIN/CALCIUM CARB/MAGNESIUM 324 MG
81 TABLET ORAL DAILY
Qty: 0 | Refills: 0 | Status: DISCONTINUED | OUTPATIENT
Start: 2019-03-13 | End: 2019-03-26

## 2019-03-13 RX ADMIN — ISOSORBIDE MONONITRATE 60 MILLIGRAM(S): 60 TABLET, EXTENDED RELEASE ORAL at 12:10

## 2019-03-13 RX ADMIN — Medication 100 MILLIGRAM(S): at 05:29

## 2019-03-13 RX ADMIN — Medication 3 MILLILITER(S): at 21:26

## 2019-03-13 RX ADMIN — GABAPENTIN 100 MILLIGRAM(S): 400 CAPSULE ORAL at 12:22

## 2019-03-13 RX ADMIN — Medication 20 MILLIGRAM(S): at 05:29

## 2019-03-13 RX ADMIN — Medication 2: at 08:40

## 2019-03-13 RX ADMIN — Medication 8: at 12:09

## 2019-03-13 RX ADMIN — Medication 3 UNIT(S): at 08:40

## 2019-03-13 RX ADMIN — Medication 3 UNIT(S): at 12:09

## 2019-03-13 RX ADMIN — SENNA PLUS 2 TABLET(S): 8.6 TABLET ORAL at 22:06

## 2019-03-13 RX ADMIN — ATORVASTATIN CALCIUM 80 MILLIGRAM(S): 80 TABLET, FILM COATED ORAL at 22:06

## 2019-03-13 RX ADMIN — CARVEDILOL PHOSPHATE 6.25 MILLIGRAM(S): 80 CAPSULE, EXTENDED RELEASE ORAL at 05:28

## 2019-03-13 RX ADMIN — Medication 8: at 17:57

## 2019-03-13 RX ADMIN — Medication 60 MILLIGRAM(S): at 18:43

## 2019-03-13 RX ADMIN — AMLODIPINE BESYLATE 5 MILLIGRAM(S): 2.5 TABLET ORAL at 05:28

## 2019-03-13 RX ADMIN — CARVEDILOL PHOSPHATE 6.25 MILLIGRAM(S): 80 CAPSULE, EXTENDED RELEASE ORAL at 18:40

## 2019-03-13 RX ADMIN — Medication 500 MILLIGRAM(S): at 12:10

## 2019-03-13 RX ADMIN — Medication 3 UNIT(S): at 17:58

## 2019-03-13 RX ADMIN — Medication 2: at 22:07

## 2019-03-13 RX ADMIN — ERYTHROPOIETIN 20000 UNIT(S): 10000 INJECTION, SOLUTION INTRAVENOUS; SUBCUTANEOUS at 20:01

## 2019-03-13 RX ADMIN — Medication 60 MILLIGRAM(S): at 12:20

## 2019-03-13 RX ADMIN — Medication 40 MILLIGRAM(S): at 05:29

## 2019-03-13 RX ADMIN — Medication 1 TABLET(S): at 12:09

## 2019-03-13 RX ADMIN — MAGNESIUM OXIDE 400 MG ORAL TABLET 400 MILLIGRAM(S): 241.3 TABLET ORAL at 12:10

## 2019-03-13 RX ADMIN — ENOXAPARIN SODIUM 40 MILLIGRAM(S): 100 INJECTION SUBCUTANEOUS at 22:06

## 2019-03-13 RX ADMIN — INSULIN GLARGINE 10 UNIT(S): 100 INJECTION, SOLUTION SUBCUTANEOUS at 22:06

## 2019-03-13 RX ADMIN — Medication 3 MILLILITER(S): at 02:09

## 2019-03-13 RX ADMIN — PANTOPRAZOLE SODIUM 40 MILLIGRAM(S): 20 TABLET, DELAYED RELEASE ORAL at 05:28

## 2019-03-13 RX ADMIN — DULOXETINE HYDROCHLORIDE 60 MILLIGRAM(S): 30 CAPSULE, DELAYED RELEASE ORAL at 12:10

## 2019-03-13 RX ADMIN — Medication 100 MILLIGRAM(S): at 12:20

## 2019-03-13 RX ADMIN — Medication 3 MILLILITER(S): at 09:27

## 2019-03-13 RX ADMIN — GABAPENTIN 100 MILLIGRAM(S): 400 CAPSULE ORAL at 22:06

## 2019-03-13 RX ADMIN — GABAPENTIN 100 MILLIGRAM(S): 400 CAPSULE ORAL at 05:29

## 2019-03-13 RX ADMIN — Medication 3 MILLILITER(S): at 15:54

## 2019-03-13 RX ADMIN — Medication 650 MILLIGRAM(S): at 18:44

## 2019-03-13 RX ADMIN — Medication 650 MILLIGRAM(S): at 12:06

## 2019-03-13 NOTE — PROGRESS NOTE ADULT - SUBJECTIVE AND OBJECTIVE BOX
[place] [facility]                                                        Office: [address] [address]                                                       Telephone: [phone]. Fax: [phone]      CC: Shortness of breath    INTERVAL HISTORY: now improved.     MEDICATIONS  (STANDING):  ALBUTerol    0.083% 10 milliGRAM(s) Nebulizer once  ALBUTerol/ipratropium for Nebulization 3 milliLiter(s) Nebulizer every 6 hours  amLODIPine   Tablet 5 milliGRAM(s) Oral daily  ascorbic acid 500 milliGRAM(s) Oral daily  atorvastatin 80 milliGRAM(s) Oral at bedtime  carvedilol 6.25 milliGRAM(s) Oral every 12 hours  dextrose 5%. 1000 milliLiter(s) (50 mL/Hr) IV Continuous <Continuous>  dextrose 50% Injectable 12.5 Gram(s) IV Push once  dextrose 50% Injectable 25 Gram(s) IV Push once  dextrose 50% Injectable 25 Gram(s) IV Push once  docusate sodium 100 milliGRAM(s) Oral two times a day  DULoxetine 60 milliGRAM(s) Oral daily  epoetin sara Injectable 37941 Unit(s) SubCutaneous <User Schedule>  furosemide   Injectable 60 milliGRAM(s) IV Push every 12 hours  gabapentin 100 milliGRAM(s) Oral three times a day  insulin glargine Injectable (LANTUS) 10 Unit(s) SubCutaneous at bedtime  insulin lispro (HumaLOG) corrective regimen sliding scale   SubCutaneous three times a day before meals  insulin lispro (HumaLOG) corrective regimen sliding scale   SubCutaneous at bedtime  insulin lispro Injectable (HumaLOG) 3 Unit(s) SubCutaneous three times a day before meals  isosorbide   mononitrate ER Tablet (IMDUR) 60 milliGRAM(s) Oral daily  magnesium oxide 400 milliGRAM(s) Oral daily  Nephro-riya 1 Tablet(s) Oral daily  pantoprazole    Tablet 40 milliGRAM(s) Oral before breakfast  polyethylene glycol 3350 17 Gram(s) Oral daily  predniSONE   Tablet   Oral   senna 2 Tablet(s) Oral at bedtime    ROS: All others negative     PHYSICAL EXAM:  T(C): 36.4 (03-13-19 @ 10:13), Max: 36.8 (03-12-19 @ 16:02)  HR: 70 (03-13-19 @ 12:28) (63 - 75)  BP: 114/60 (03-13-19 @ 12:28) (104/51 - 129/65)  RR: 19 (03-13-19 @ 12:28) (19 - 20)  SpO2: 93% (03-13-19 @ 12:28) (93% - 97%)  Wt(kg): --  I&O's Summary    12 Mar 2019 07:01  -  13 Mar 2019 07:00  --------------------------------------------------------  IN: 480 mL / OUT: 1350 mL / NET: -870 mL    13 Mar 2019 07:01  -  13 Mar 2019 15:23  --------------------------------------------------------  IN: 480 mL / OUT: 700 mL / NET: -220 mL      Appearance: Normal	  HEENT:   blind in one eye.   Lymphatic: No lymphadenopathy  Cardiovascular: Normal S1 S2, No JVD, No murmurs, No edema  Respiratory: Lungs clear to auscultation	  Psychiatry: Alert  Gastrointestinal:  Soft, Non-tender, + BS	  Skin: No rashes, No ecchymoses, No cyanosis  Neurologic: Non-focal  Extremities: limited.   Vascular: Peripheral pulses palpable 2+ bilaterally    TELEMETRY: 	      LABS:	 	                        8.7    8.4   )-----------( 139      ( 13 Mar 2019 06:20 )             26.5     03-13    142  |  96<L>  |  92.0<H>  ----------------------------<  185<H>  4.2   |  38.0<H>  |  1.50<H>    Ca    8.4<L>      13 Mar 2019 06:20  Phos  2.4     03-13  Mg     2.5     03-13

## 2019-03-13 NOTE — PROGRESS NOTE ADULT - SUBJECTIVE AND OBJECTIVE BOX
NEPHROLOGY INTERVAL HPI/OVERNIGHT EVENTS:    No new events. In bed ,ate.    MEDICATIONS  (STANDING):  ALBUTerol    0.083% 10 milliGRAM(s) Nebulizer once  ALBUTerol/ipratropium for Nebulization 3 milliLiter(s) Nebulizer every 6 hours  amLODIPine   Tablet 5 milliGRAM(s) Oral daily  ascorbic acid 500 milliGRAM(s) Oral daily  aspirin enteric coated 81 milliGRAM(s) Oral daily  atorvastatin 80 milliGRAM(s) Oral at bedtime  carvedilol 6.25 milliGRAM(s) Oral every 12 hours  dextrose 5%. 1000 milliLiter(s) (50 mL/Hr) IV Continuous <Continuous>  dextrose 50% Injectable 12.5 Gram(s) IV Push once  dextrose 50% Injectable 25 Gram(s) IV Push once  dextrose 50% Injectable 25 Gram(s) IV Push once  docusate sodium 100 milliGRAM(s) Oral two times a day  DULoxetine 60 milliGRAM(s) Oral daily  epoetin sara Injectable 04682 Unit(s) SubCutaneous <User Schedule>  gabapentin 100 milliGRAM(s) Oral three times a day  heparin  Injectable 5000 Unit(s) SubCutaneous every 12 hours  insulin glargine Injectable (LANTUS) 10 Unit(s) SubCutaneous at bedtime  insulin lispro (HumaLOG) corrective regimen sliding scale   SubCutaneous three times a day before meals  insulin lispro (HumaLOG) corrective regimen sliding scale   SubCutaneous at bedtime  insulin lispro Injectable (HumaLOG) 3 Unit(s) SubCutaneous three times a day before meals  isosorbide   mononitrate ER Tablet (IMDUR) 60 milliGRAM(s) Oral daily  magnesium oxide 400 milliGRAM(s) Oral daily  Nephro-riya 1 Tablet(s) Oral daily  pantoprazole    Tablet 40 milliGRAM(s) Oral before breakfast  polyethylene glycol 3350 17 Gram(s) Oral daily  predniSONE   Tablet 20 milliGRAM(s) Oral daily  predniSONE   Tablet   Oral   senna 2 Tablet(s) Oral at bedtime  torsemide 40 milliGRAM(s) Oral daily    MEDICATIONS  (PRN):  acetaminophen   Tablet .. 650 milliGRAM(s) Oral every 6 hours PRN Mild Pain (1 - 3)  dextrose 40% Gel 15 Gram(s) Oral once PRN Blood Glucose LESS THAN 70 milliGRAM(s)/deciliter  glucagon  Injectable 1 milliGRAM(s) IntraMuscular once PRN Glucose LESS THAN 70 milligrams/deciliter  glycopyrrolate Injectable 0.1 milliGRAM(s) IV Push every 8 hours PRN secreations  guaiFENesin    Syrup 100 milliGRAM(s) Oral every 6 hours PRN Cough  ondansetron Injectable 4 milliGRAM(s) IV Push every 6 hours PRN Nausea and/or Vomiting      Allergies    Accupril (Other)          Vital Signs Last 24 Hrs    T(C): 36.4 (13 Mar 2019 10:13), Max: 36.8 (12 Mar 2019 16:02)  T(F): 97.6 (13 Mar 2019 10:13), Max: 98.2 (12 Mar 2019 16:02)  HR: 67 (13 Mar 2019 10:13) (63 - 75)  BP: 104/51 (13 Mar 2019 10:13) (104/51 - 129/65)  BP(mean): --  RR: 20 (13 Mar 2019 10:13) (20 - 20)  SpO2: 93% (13 Mar 2019 09:30) (93% - 97%)  T(C): 36.7 (12 Mar 2019 09:51), Max: 36.7 (12 Mar 2019 09:51)  T(F): 98 (12 Mar 2019 09:51), Max: 98 (12 Mar 2019 09:51)  HR: 82 (12 Mar 2019 10:15) (61 - 82)  BP: 118/57 (12 Mar 2019 09:51) (106/60 - 130/63)  BP(mean): --  RR: 19 (12 Mar 2019 09:51) (18 - 20)  SpO2: 90% (12 Mar 2019 10:15) (90% - 98%)  Daily     Daily Weight in k (12 Mar 2019 05:18)    PHYSICAL EXAM:  Appears chronically ill  Cardiovascular: Normal S1 S2, No rub  Respiratory: Clear lungs with decreased BS at bases	  Neuro: A & O x 3; non focal  Gastrointestinal:  Soft, Non-tender, + BS	  Extremities: + LE edema   fontenot  with yellow urine    LABS:                        8.6    7.8   )-----------( 142      ( 12 Mar 2019 05:42 )             26.0     03-12    145  |  97<L>  |  88.0<H>  ----------------------------<  154<H>  3.9   |  38.0<H>  |  1.39<H>    Ca    8.0<L>      12 Mar 2019 05:42  Phos  2.5     -  Mg     2.7               Magnesium, Serum: 2.7 mg/dL ( @ 05:42)  Phosphorus Level, Serum: 2.5 mg/dL ( @ 05:42)          RADIOLOGY & ADDITIONAL TESTS:

## 2019-03-13 NOTE — PROGRESS NOTE ADULT - SUBJECTIVE AND OBJECTIVE BOX
PULMONARY PROGRESS NOTE      RANJIT ROCHA-[id number]    Patient is a 63y old  Female who presents with a chief complaint of SOB (13 Mar 2019 11:09)      INTERVAL HPI/OVERNIGHT EVENTS:  Chest tube out  Hemoptysis last night  CT done>diffuse interstitial infiltrates  Remains with right moderate sized loculated effusion    MEDICATIONS  (STANDING):  ALBUTerol    0.083% 10 milliGRAM(s) Nebulizer once  ALBUTerol/ipratropium for Nebulization 3 milliLiter(s) Nebulizer every 6 hours  amLODIPine   Tablet 5 milliGRAM(s) Oral daily  ascorbic acid 500 milliGRAM(s) Oral daily  atorvastatin 80 milliGRAM(s) Oral at bedtime  carvedilol 6.25 milliGRAM(s) Oral every 12 hours  dextrose 5%. 1000 milliLiter(s) (50 mL/Hr) IV Continuous <Continuous>  dextrose 50% Injectable 12.5 Gram(s) IV Push once  dextrose 50% Injectable 25 Gram(s) IV Push once  dextrose 50% Injectable 25 Gram(s) IV Push once  docusate sodium 100 milliGRAM(s) Oral two times a day  DULoxetine 60 milliGRAM(s) Oral daily  epoetin sara Injectable 09293 Unit(s) SubCutaneous <User Schedule>  furosemide   Injectable 60 milliGRAM(s) IV Push every 12 hours  gabapentin 100 milliGRAM(s) Oral three times a day  insulin glargine Injectable (LANTUS) 10 Unit(s) SubCutaneous at bedtime  insulin lispro (HumaLOG) corrective regimen sliding scale   SubCutaneous three times a day before meals  insulin lispro (HumaLOG) corrective regimen sliding scale   SubCutaneous at bedtime  insulin lispro Injectable (HumaLOG) 3 Unit(s) SubCutaneous three times a day before meals  isosorbide   mononitrate ER Tablet (IMDUR) 60 milliGRAM(s) Oral daily  magnesium oxide 400 milliGRAM(s) Oral daily  Nephro-riya 1 Tablet(s) Oral daily  pantoprazole    Tablet 40 milliGRAM(s) Oral before breakfast  polyethylene glycol 3350 17 Gram(s) Oral daily  predniSONE   Tablet   Oral   senna 2 Tablet(s) Oral at bedtime      MEDICATIONS  (PRN):  acetaminophen   Tablet .. 650 milliGRAM(s) Oral every 6 hours PRN Mild Pain (1 - 3)  dextrose 40% Gel 15 Gram(s) Oral once PRN Blood Glucose LESS THAN 70 milliGRAM(s)/deciliter  glucagon  Injectable 1 milliGRAM(s) IntraMuscular once PRN Glucose LESS THAN 70 milligrams/deciliter  glycopyrrolate Injectable 0.1 milliGRAM(s) IV Push every 8 hours PRN secreations  guaiFENesin    Syrup 100 milliGRAM(s) Oral every 6 hours PRN Cough  ondansetron Injectable 4 milliGRAM(s) IV Push every 6 hours PRN Nausea and/or Vomiting      Allergies    Accupril (Other)    Intolerances        PAST MEDICAL & SURGICAL HISTORY:  Herniated disc, cervical  PAD (peripheral artery disease)  Legally blind  History of peripheral vascular disease  History of retinal detachment  History of CEA (carotid endarterectomy): Right  Hyperlipidemia  Glaucoma  Hypertension  Diabetes  S/P CABG x 1  After cataract  Uveitic glaucoma of both eyes  Detached retina  Atherosclerosis of right carotid artery   delivery delivered      SOCIAL HISTORY  Smoking History:       REVIEW OF SYSTEMS:    CONSTITUTIONAL:  No distress    HEENT:  Eyes:  No diplopia or blurred vision. ENT:  No earache, sore throat or runny nose.    CARDIOVASCULAR:  No pressure, squeezing, tightness, heaviness or aching about the chest; no palpitations.    RESPIRATORY:  Per HPI; +BOONE    GASTROINTESTINAL:  No nausea, vomiting or diarrhea.    GENITOURINARY:  No dysuria, frequency or urgency.    MUSCULOSKELETAL:  No joint pain    SKIN:  No new lesions.    NEUROLOGIC:  No paresthesias, fasciculations, seizures or weakness.    PSYCHIATRIC:  No disorder of thought or mood.    ENDOCRINE:  No heat or cold intolerance, polyuria or polydipsia.    HEMATOLOGICAL:  No easy bruising or bleeding.     Vital Signs Last 24 Hrs  T(C): 36.4 (13 Mar 2019 10:13), Max: 36.8 (12 Mar 2019 16:02)  T(F): 97.6 (13 Mar 2019 10:13), Max: 98.2 (12 Mar 2019 16:02)  HR: 67 (13 Mar 2019 10:13) (63 - 75)  BP: 104/51 (13 Mar 2019 10:13) (104/51 - 129/65)  BP(mean): --  RR: 20 (13 Mar 2019 10:13) (20 - 20)  SpO2: 93% (13 Mar 2019 09:30) (93% - 97%)    PHYSICAL EXAMINATION:    GENERAL: The patient is awake and alert in no apparent distress.     HEENT: Head is normocephalic and atraumatic. Extraocular muscles are intact. Mucous membranes are moist.    NECK: Supple.    LUNGS: Decreased bilaterally R more so than L    HEART: Regular rate and rhythm without murmur.    ABDOMEN: Soft, nontender, and nondistended.      EXTREMITIES: Without any cyanosis, clubbing, rash, lesions; no leg edema.    NEUROLOGIC: Grossly intact.    SKIN: No ulceration or induration present.      LABS:                        8.7    8.4   )-----------( 139      ( 13 Mar 2019 06:20 )             26.5     03-13    142  |  96<L>  |  92.0<H>  ----------------------------<  185<H>  4.2   |  38.0<H>  |  1.50<H>    Ca    8.4<L>      13 Mar 2019 06:20  Phos  2.4       Mg     2.5     -      PT/INR - ( 13 Mar 2019 06:20 )   PT: 13.5 sec;   INR: 1.17 ratio                             MICROBIOLOGY:    RADIOLOGY & ADDITIONAL STUDIES:< from: CT Chest No Cont (19 @ 22:53) >   EXAM:  CT CHEST                          PROCEDURE DATE:  2019          INTERPRETATION:  FINAL REPORT:    CLINICAL INFORMATION: New onset hemoptysis.    COMPARISON: CT chest 3/8/2019 and 2019 and CT abdomen/pelvis   2019    PROCEDURE:   CT of the Chest was performed without intravenous contrast.  Sagittal and coronal reformats were performed.      FINDINGS:    CHEST:     LUNGS AND LARGE AIRWAYS: Patent central airways.  Groundglass opacities   throughout the the left upper and lower lobes and right middle lobe with   a more dense consolidation with peripheral groundglass attenuation in the   right upper lobe with no significant change in the right upper lobe   consolidation since 3/8/2019 and the additional consolidations slightly   increased in density. There is compressive atelectasis in the right lower   lobe secondary to an effusion and minimal compressive atelectasis in the   left lower lobe.  PLEURA: Moderate right and small left pleural effusions, unchanged. There   has been interval removal of the right chest tube. A previously seen   right pneumothorax has resolved.  VESSELS: Thoracic aorta normal in caliber with mild calcified plaque.   There are coronary artery calcifications.  HEART: Enlarged. No pericardial effusion.  MEDIASTINUM AND YAMILEX: No lymphadenopathy.  CHEST WALL AND LOWER NECK: There is anasarca. No enlarged axillary lymph   nodes.  VISUALIZED UPPER ABDOMEN: Small to moderate amount of upper abdominal   ascites, not significantly changed since 3/8/2019. 4 mm nonobstructing   calculus in the right kidney.  BONES: Status post sternotomy. Degenerative changes in the spine    IMPRESSION:     Bilateral lung opacities including groundglass opacities in the left   upper and lower lobes and right middle lobe which have increased in   density since 3/8/2019 and a mixed density right upper lobe   consolidation, which has not significantly changed with the differential   including pulmonary edema, however underlying pneumonia cannot be   excluded in the right upper lobe.    Small to moderate right and small left pleural effusions, unchanged.    Anasarca and small to moderate upper abdominal ascites, unchanged.    Interval removal of right chest tube.                SAPNA GRACE   Thisdocument has been electronically signed. Mar 13 2019 10:13AM              < end of copied text >

## 2019-03-13 NOTE — PROGRESS NOTE ADULT - ASSESSMENT
62 yo presenting to ED w/ SOB 2/2 diastolic HF exacerbation and noted to have KEITH and hyperkalemia, PMH CAD s/p CABG and PCI, PAD s/p baloon angioplasty, DM2, HTN, HLD, hx of cardiac arrest, recent GI bleed last month, Diastolic CHF, legally blind, pleural effusion requiring thoracentesis in Dec 2018, admitted for SOB, noted to have pleural effusion, started on bumex, s/p RRT noted on 3/1 for hypotension & lethargy. Pt became hypotensive after Bumex with albumin was given.  CXR with worsening plum edema b/l. BiPAP placed. ICU consult called for vasopressor support during IV diuresis. Midodrine ordered. She became increasingly lethargic with ABG showing resp acidosis with hypoxia. Pulmonary consult appreciated    course complicated by hemoptysis    course complicated by recurrent hf exac.     A/p    > Acute on Chronic Diastolic Heart Failure with severe PHTN  & right ventricular failure from OHS/ KYREE complicated by pulm edema & pleural effusions  -prior exac resolved, now with recurrence  - sp chest tube w/ drainage, removed by CT sx on 3/12, uncomplicated  - hold torsemide, resume lasix 60mg iv bid, strict i/o  c/w coreg & Imdur  - losartan as below  - ECHO shows EF of > 75%. Moderate to Severe TR, mod-severe PHTN  - cardio fu appreciated    > Hemoptysis  per pt present for several days, no recurrence noted on 3/13   ? related to superficial lac in trachea from use of around the clock o2  h/h stable, resume asa + dvt ppx for now  serial h/h  repeat ct chest reviewed, no acute findings that require intervention noted  pulm fu appreciated    > Acute on Chronic Hypercapnic Respiratory Failure due to OHS/KYREE  stable  Pulm recommend chronic NIV for chronic hypercarbic respiratory failure to improve symptoms and to decrease hospitalizations  c/w Bipap qhs  Eventual SINGH with NIV  c/w steroid taper, last day 3/16  Avoid Narcotics for pain    > RT PTX  - small lesion, noted on CT  - sp chest tube placement + removal  - resolved    >UTI - appreciate ID input, sp completed course of invanz, resolved    >Dysphagia - had a RR due to aspiration, speech eval appreciated - recommend regular diet with thin liquid.   aspiration precautions    >Chest pain with hx of CAD & CABG/PCI-  sp PRBC to improve oxygen to myocardium  c/w Imdur, BB, statin  ASA as above   Plavix on hold since last admission due to GI Bleed.    > Lower GI Bleed likely diverticular-   - s/p 1 unit of PRBC, well tolerated, uncomplicated  - s/p Colonoscopy - Poor prep throughout the colon. Diverticulosis seen. Unable to visualize colon sufficiently  No further GI w/u recommended,   cleared by GI to use asa but current use as above  Plavix yet on hold, outpt fu    >Hyperkalemia  - resolved  - Holding Losartan, resume as needed for HF + bp control    > Acute on chronic kidney injury - resolved  appreciate renal input - - cont Torsemide ==> will need to tolerate azotemia  ct hold losartan   f/u BMP     > CAD complicated by stents in 2014 / PAD / HLD / HTN  - Continue, Imdur, Lipitor, Coreg and Norvasc   - Plavix on hold since last admission due to GI Bleed.     > DM 2 not on long term insulin complicated by asymptomatic hyperglycemia  - HbA1c 5.6, goal < 8, controlled  - Lantus at 10 units at bedtime plus Humalog 3U   - c/w moderate sliding scale    > Anemia  - Multifactorial (chronic, complicated with acute gi bleeding during admit)  - s/p Venofer   - Continue Procrit  + stool for occult blood (will need GI work-up as outpt)  - Monitor CBC  - per renal goal hgb 10    > Transaminitis- resolved  - Likely secondary to liver congestion    > History of Depression  - chronic, stable, uncomplicated  - Continue Duloxetine 60 mg    >B/L UE swelling  - Arm elevation  - Warm compresses   - No DVT     >Obesity in setting of Acute Moderate Protein Calorie Malnutrition  bmi 36.1  Nepro TID  Neprovite and Vit C 500mg daily  daily wt    DVT Prophylaxis -- hold in light of hemoptysis, may resume depending on outcome of w/u    >Goals of care - palliative on board   DNR  prognosis guarded, case discussed w/ palliative, will see if as time progresses if pt would like further info regarding hospice    >dispo. PT eval appreciated, transfer to [facility] when medically stable after w/u of hemoptysis completed. pt in agreeance for transfer 64 yo presenting to ED w/ SOB 2/2 diastolic HF exacerbation and noted to have KEITH and hyperkalemia, PMH CAD s/p CABG and PCI, PAD s/p baloon angioplasty, DM2, HTN, HLD, hx of cardiac arrest, recent GI bleed last month, Diastolic CHF, legally blind, pleural effusion requiring thoracentesis in Dec 2018, admitted for SOB, noted to have pleural effusion, started on bumex, s/p RRT noted on 3/1 for hypotension & lethargy. Pt became hypotensive after Bumex with albumin was given.  CXR with worsening plum edema b/l. BiPAP placed. ICU consult called for vasopressor support during IV diuresis. Midodrine ordered. She became increasingly lethargic with ABG showing resp acidosis with hypoxia. Pulmonary consult appreciated    course complicated by hemoptysis    course complicated by recurrent hf exac.     A/p    > Acute on Chronic Diastolic Heart Failure with severe PHTN  & right ventricular failure from OHS/ KYREE complicated by pulm edema & pleural effusions  -prior exac resolved, now with recurrence  - sp chest tube w/ drainage, removed by CT sx on 3/12, uncomplicated  - hold torsemide, resume lasix 60mg iv bid, strict i/o  c/w coreg & Imdur  - losartan as below  - ECHO shows EF of > 75%. Moderate to Severe TR, mod-severe PHTN  - cardio fu appreciated  -if/when pt stabilizes, can consider IR follow up to assess for pleurex catheter placement    > Hemoptysis  per pt present for several days, no recurrence noted on 3/13   ? related to superficial lac in trachea from use of around the clock o2  h/h stable, resume asa + dvt ppx for now  serial h/h  repeat ct chest reviewed, no acute findings that require intervention noted  pulm fu appreciated    > Acute on Chronic Hypercapnic Respiratory Failure due to OHS/KYREE  stable  Pulm recommend chronic NIV for chronic hypercarbic respiratory failure to improve symptoms and to decrease hospitalizations  c/w Bipap qhs  Eventual SINGH with NIV  c/w steroid taper, last day 3/16  Avoid Narcotics for pain    > RT PTX  - small lesion, noted on CT  - sp chest tube placement + removal  - resolved    >UTI - appreciate ID input, sp completed course of invanz, resolved    >Dysphagia - had a RR due to aspiration, speech eval appreciated - recommend regular diet with thin liquid.   aspiration precautions    >Chest pain with hx of CAD & CABG/PCI-  sp PRBC to improve oxygen to myocardium  c/w Imdur, BB, statin  ASA as above   Plavix on hold since last admission due to GI Bleed.    > Lower GI Bleed likely diverticular-   - s/p 1 unit of PRBC, well tolerated, uncomplicated  - s/p Colonoscopy - Poor prep throughout the colon. Diverticulosis seen. Unable to visualize colon sufficiently  No further GI w/u recommended,   cleared by GI to use asa but current use as above  Plavix yet on hold, outpt fu    >Hyperkalemia  - resolved  - Holding Losartan, resume as needed for HF + bp control    > Acute on chronic kidney injury - resolved  appreciate renal input - - cont Torsemide ==> will need to tolerate azotemia  ct hold losartan   f/u BMP     > CAD complicated by stents in 2014 / PAD / HLD / HTN  - Continue, Imdur, Lipitor, Coreg and Norvasc   - Plavix on hold since last admission due to GI Bleed.     > DM 2 not on long term insulin complicated by asymptomatic hyperglycemia  - HbA1c 5.6, goal < 8, controlled  - Lantus at 10 units at bedtime plus Humalog 3U   - c/w moderate sliding scale    > Anemia  - Multifactorial (chronic, complicated with acute gi bleeding during admit)  - s/p Venofer   - Continue Procrit  + stool for occult blood (will need GI work-up as outpt)  - Monitor CBC  - per renal goal hgb 10    > Transaminitis- resolved  - Likely secondary to liver congestion    > History of Depression  - chronic, stable, uncomplicated  - Continue Duloxetine 60 mg    >B/L UE swelling  - Arm elevation  - Warm compresses   - No DVT     >Obesity in setting of Acute Moderate Protein Calorie Malnutrition  bmi 36.1  Nepro TID  Neprovite and Vit C 500mg daily  daily wt    DVT Prophylaxis -- hold in light of hemoptysis, may resume depending on outcome of w/u    >Goals of care - palliative on board   DNR  prognosis guarded, case discussed w/ palliative, will see if as time progresses if pt would like further info regarding hospice    >dispo. PT eval appreciated, transfer to [facility] when medically stable after w/u of hemoptysis completed. pt in agreeance for transfer

## 2019-03-13 NOTE — PROGRESS NOTE ADULT - SUBJECTIVE AND OBJECTIVE BOX
CC: hypoxia    Patient seen and examined at the bedside. No acute overnight events. hemoptysis yesterday. Complaining of nothing this AMDenies fever/chills, headache, lightheadedness, dizziness, chest pain, palpitations, cough, abd pain, nausea/vomiting/diarrhea, muscle pain.      =========================================================================================    PHYSICAL EXAM.    GEN - appears age appropriate. obese. pleasant. no distress.   HEENT - NCAT, EOMI, GABBY  RESP - course breath sounds sec to supplemetal o2. no wheeze/stridor. on supplemental O2. able to speak in full sentences but now w/ onset of mild distress.   CARDIO - NS1S2, RRR. No murmurs/rubs/gallops.  ABD - Soft/Non tender/Non distended. Normal BS x4 quadrants. no guarding/rebound tenderness.  Ext - No BISI.  Psych - normal affect  Skin - c/d/i. no rashes/lesions      VITAL SIGNS.    Vital Signs Last 24 Hrs  T(C): 37.1 (13 Mar 2019 15:40), Max: 37.1 (13 Mar 2019 15:40)  T(F): 98.8 (13 Mar 2019 15:40), Max: 98.8 (13 Mar 2019 15:40)  HR: 76 (13 Mar 2019 18:34) (63 - 76)  BP: 128/70 (13 Mar 2019 18:34) (104/51 - 145/70)  BP(mean): --  RR: 20 (13 Mar 2019 18:34) (19 - 20)  SpO2: 98% (13 Mar 2019 15:55) (93% - 98%)        =================================================    LABS.                          8.7    8.4   )-----------( 139      ( 13 Mar 2019 06:20 )             26.5     03-13    142  |  96<L>  |  92.0<H>  ----------------------------<  185<H>  4.2   |  38.0<H>  |  1.50<H>    Ca    8.4<L>      13 Mar 2019 06:20  Phos  2.4     03-13  Mg     2.5     03-13        PT/INR - ( 13 Mar 2019 06:20 )   PT: 13.5 sec;   INR: 1.17 ratio           I&O's Summary    12 Mar 2019 07:01  -  13 Mar 2019 07:00  --------------------------------------------------------  IN: 480 mL / OUT: 1350 mL / NET: -870 mL    13 Mar 2019 07:01  -  13 Mar 2019 19:39  --------------------------------------------------------  IN: 480 mL / OUT: 700 mL / NET: -220 mL          ================================================    IMAGING.    < from: CT Chest No Cont (03.12.19 @ 22:53) >  FINDINGS:    CHEST:     LUNGS AND LARGE AIRWAYS: Patent central airways.  Groundglass opacities   throughout the the left upper and lower lobes and right middle lobe with   a more dense consolidation with peripheral groundglass attenuation in the   right upper lobe with no significant change in the right upper lobe   consolidation since 3/8/2019 and the additional consolidations slightly   increased in density. There is compressive atelectasis in the right lower   lobe secondary to an effusion and minimal compressive atelectasis in the   left lower lobe.  PLEURA: Moderate right and small left pleural effusions, unchanged. There   has been interval removal of the right chest tube. A previously seen   right pneumothorax has resolved.  VESSELS: Thoracic aorta normal in caliber with mild calcified plaque.   There are coronary artery calcifications.  HEART: Enlarged. No pericardial effusion.  MEDIASTINUM AND YAMILEX: No lymphadenopathy.  CHEST WALL AND LOWER NECK: There is anasarca. No enlarged axillary lymph   nodes.  VISUALIZED UPPER ABDOMEN: Small to moderate amount of upper abdominal   ascites, not significantly changed since 3/8/2019. 4 mm nonobstructing   calculus in the right kidney.  BONES: Status post sternotomy. Degenerative changes in the spine    IMPRESSION:     Bilateral lung opacities including groundglass opacities in the left   upper and lower lobes and right middle lobe which have increased in   density since 3/8/2019 and a mixed density right upper lobe   consolidation, which has not significantly changed with the differential   including pulmonary edema, however underlying pneumonia cannot be   excluded in the right upper lobe.    Small to moderate right and small left pleural effusions, unchanged.    Anasarca and small to moderate upper abdominal ascites, unchanged.    Interval removal of right chest tube.      < end of copied text >    ================================================    HOME MEDS.    Home Medications:  albuterol 2.5 mg/3 mL (0.083%) inhalation solution: 2.5 milligram(s) inhaled every 4 hours, As Needed sob or wheeze (11 Mar 2019 17:51)  amLODIPine 5 mg oral tablet: 1 tab(s) orally once a day (11 Mar 2019 17:51)  ascorbic acid 500 mg oral tablet: 1 tab(s) orally once a day (11 Mar 2019 17:51)  Aspi-Cor 81 mg oral delayed release tablet: 1 tab(s) orally once a day (11 Mar 2019 17:51)  aspirin 81 mg oral tablet, chewable: 1 tab(s) orally once a day (05 Mar 2019 10:57)  atorvastatin 80 mg oral tablet: 1 tab(s) orally once a day (at bedtime) (05 Mar 2019 10:57)  carvedilol 25 mg oral tablet: 1 tab(s) orally every 12 hours (05 Mar 2019 10:57)  Colace 100 mg oral capsule: 1 cap(s) orally 2 times a day (11 Mar 2019 17:51)  Coreg 6.25 mg oral tablet: 1 tab(s) orally every 12 hours (11 Mar 2019 17:51)  Cymbalta 60 mg oral delayed release capsule: 1 cap(s) orally once a day (11 Mar 2019 17:51)  docusate sodium 100 mg oral capsule: 1 cap(s) orally 2 times a day (05 Mar 2019 10:57)  DULoxetine 60 mg oral delayed release capsule: 1 cap(s) orally once a day (05 Mar 2019 10:57)  ferrous sulfate 325 mg (65 mg elemental iron) oral tablet: 1 tab(s) orally 2 times a day (05 Mar 2019 10:57)  gabapentin 100 mg oral capsule: 1 cap(s) orally 3 times a day (05 Mar 2019 10:57)  guaiFENesin 100 mg/5 mL oral liquid: 5 milliliter(s) orally every 6 hours, As needed, Cough (11 Mar 2019 17:51)  ipratropium-albuterol 0.5 mg-2.5 mg/3 mLinhalation solution: 3 milliliter(s) inhaled every 6 hours (11 Mar 2019 17:51)  isosorbide mononitrate 60 mg oral tablet, extended release: 1 tab(s) orally once a day (in the morning) (05 Mar 2019 10:57)  isosorbide mononitrate 60 mg oral tablet, extended release: 1 tab(s) orally once a day (11 Mar 2019 17:51)  Lipitor 80 mg oral tablet: 1 tab(s) orally once a day (at bedtime) (11 Mar 2019 17:51)  Mag-Ox 400 oral tablet: 1 tab(s) orally once a day (11 Mar 2019 17:51)  magnesium oxide 400 mg (240 mg elemental magnesium) oral tablet: 1 tab(s) orally once a day (05 Mar 2019 10:57)  melatonin 3 mg oral tablet: 1 tab(s) orally once a day (at bedtime), As needed, Insomnia (05 Mar 2019 10:57)  metFORMIN 850 mg oral tablet: orally 2 times a day (with meals) (05 Mar 2019 10:56)  multivitamin: 1 tab(s) orally once a day (11 Mar 2019 17:51)  Neurontin 100 mg oral capsule: 1 cap(s) orally 3 times a day (11 Mar 2019 17:51)  pantoprazole 40 mg oral delayed release tablet: 1 tab(s) orally once a day (before a meal) (05 Mar 2019 10:57)  polyethylene glycol 3350 oral powder for reconstitution: 17 gram(s) orally once a day (11 Mar 2019 17:51)  Protonix 40 mg oral delayed release tablet: 1 tab(s) orally once a day (before a meal) (11 Mar 2019 17:51)  senna oral tablet: 2 tab(s) orally once a day (at bedtime) (11 Mar 2019 17:51)  torsemide 20 mg oral tablet: 2 tab(s) orally once a day (11 Mar 2019 17:51)      ================================================    HOSPITAL MEDS.    MEDICATIONS  (STANDING):  ALBUTerol    0.083% 10 milliGRAM(s) Nebulizer once  ALBUTerol/ipratropium for Nebulization 3 milliLiter(s) Nebulizer every 6 hours  amLODIPine   Tablet 5 milliGRAM(s) Oral daily  ascorbic acid 500 milliGRAM(s) Oral daily  atorvastatin 80 milliGRAM(s) Oral at bedtime  carvedilol 6.25 milliGRAM(s) Oral every 12 hours  dextrose 5%. 1000 milliLiter(s) (50 mL/Hr) IV Continuous <Continuous>  dextrose 50% Injectable 12.5 Gram(s) IV Push once  dextrose 50% Injectable 25 Gram(s) IV Push once  dextrose 50% Injectable 25 Gram(s) IV Push once  docusate sodium 100 milliGRAM(s) Oral two times a day  DULoxetine 60 milliGRAM(s) Oral daily  epoetin sara Injectable 11795 Unit(s) SubCutaneous <User Schedule>  furosemide   Injectable 60 milliGRAM(s) IV Push every 12 hours  gabapentin 100 milliGRAM(s) Oral three times a day  insulin glargine Injectable (LANTUS) 10 Unit(s) SubCutaneous at bedtime  insulin lispro (HumaLOG) corrective regimen sliding scale   SubCutaneous three times a day before meals  insulin lispro (HumaLOG) corrective regimen sliding scale   SubCutaneous at bedtime  insulin lispro Injectable (HumaLOG) 3 Unit(s) SubCutaneous three times a day before meals  isosorbide   mononitrate ER Tablet (IMDUR) 60 milliGRAM(s) Oral daily  magnesium oxide 400 milliGRAM(s) Oral daily  Nephro-riya 1 Tablet(s) Oral daily  pantoprazole    Tablet 40 milliGRAM(s) Oral before breakfast  polyethylene glycol 3350 17 Gram(s) Oral daily  predniSONE   Tablet   Oral   senna 2 Tablet(s) Oral at bedtime    MEDICATIONS  (PRN):  acetaminophen   Tablet .. 650 milliGRAM(s) Oral every 6 hours PRN Mild Pain (1 - 3)  dextrose 40% Gel 15 Gram(s) Oral once PRN Blood Glucose LESS THAN 70 milliGRAM(s)/deciliter  glucagon  Injectable 1 milliGRAM(s) IntraMuscular once PRN Glucose LESS THAN 70 milligrams/deciliter  glycopyrrolate Injectable 0.1 milliGRAM(s) IV Push every 8 hours PRN secreations  guaiFENesin    Syrup 100 milliGRAM(s) Oral every 6 hours PRN Cough  ondansetron Injectable 4 milliGRAM(s) IV Push every 6 hours PRN Nausea and/or Vomiting

## 2019-03-13 NOTE — PROGRESS NOTE ADULT - ASSESSMENT
Patient with OHS/KYREE, persistent right effusion, bilateral interstitial infiltrates c/w with fluid likely related to known severe PAH.    Hemoptysis likely secondary to increased PAP.     Plan:  1.Agree with increase in IV lasix thought patient significantly uremic (prerenal)  2.Consider increasing steroids again but higher doses did not necessarily alter process  3.Continue nocturnal BIPAP  4.May need to replace chest tube.

## 2019-03-14 LAB
GLUCOSE BLDC GLUCOMTR-MCNC: 102 MG/DL — HIGH (ref 70–99)
GLUCOSE BLDC GLUCOMTR-MCNC: 167 MG/DL — HIGH (ref 70–99)
GLUCOSE BLDC GLUCOMTR-MCNC: 177 MG/DL — HIGH (ref 70–99)
GLUCOSE BLDC GLUCOMTR-MCNC: 221 MG/DL — HIGH (ref 70–99)

## 2019-03-14 PROCEDURE — 99232 SBSQ HOSP IP/OBS MODERATE 35: CPT

## 2019-03-14 RX ORDER — INSULIN GLARGINE 100 [IU]/ML
20 INJECTION, SOLUTION SUBCUTANEOUS AT BEDTIME
Qty: 0 | Refills: 0 | Status: DISCONTINUED | OUTPATIENT
Start: 2019-03-14 | End: 2019-03-17

## 2019-03-14 RX ADMIN — MAGNESIUM OXIDE 400 MG ORAL TABLET 400 MILLIGRAM(S): 241.3 TABLET ORAL at 12:12

## 2019-03-14 RX ADMIN — Medication 3 MILLILITER(S): at 03:26

## 2019-03-14 RX ADMIN — POLYETHYLENE GLYCOL 3350 17 GRAM(S): 17 POWDER, FOR SOLUTION ORAL at 21:25

## 2019-03-14 RX ADMIN — Medication 500 MILLIGRAM(S): at 12:12

## 2019-03-14 RX ADMIN — DULOXETINE HYDROCHLORIDE 60 MILLIGRAM(S): 30 CAPSULE, DELAYED RELEASE ORAL at 12:12

## 2019-03-14 RX ADMIN — ISOSORBIDE MONONITRATE 60 MILLIGRAM(S): 60 TABLET, EXTENDED RELEASE ORAL at 12:12

## 2019-03-14 RX ADMIN — Medication 100 MILLIGRAM(S): at 06:03

## 2019-03-14 RX ADMIN — Medication 2: at 12:11

## 2019-03-14 RX ADMIN — Medication 1 TABLET(S): at 12:12

## 2019-03-14 RX ADMIN — Medication 650 MILLIGRAM(S): at 13:34

## 2019-03-14 RX ADMIN — Medication 3 UNIT(S): at 09:00

## 2019-03-14 RX ADMIN — CARVEDILOL PHOSPHATE 6.25 MILLIGRAM(S): 80 CAPSULE, EXTENDED RELEASE ORAL at 17:25

## 2019-03-14 RX ADMIN — INSULIN GLARGINE 20 UNIT(S): 100 INJECTION, SOLUTION SUBCUTANEOUS at 23:11

## 2019-03-14 RX ADMIN — GABAPENTIN 100 MILLIGRAM(S): 400 CAPSULE ORAL at 13:34

## 2019-03-14 RX ADMIN — GABAPENTIN 100 MILLIGRAM(S): 400 CAPSULE ORAL at 21:25

## 2019-03-14 RX ADMIN — GABAPENTIN 100 MILLIGRAM(S): 400 CAPSULE ORAL at 06:04

## 2019-03-14 RX ADMIN — CARVEDILOL PHOSPHATE 6.25 MILLIGRAM(S): 80 CAPSULE, EXTENDED RELEASE ORAL at 06:03

## 2019-03-14 RX ADMIN — Medication 60 MILLIGRAM(S): at 17:26

## 2019-03-14 RX ADMIN — Medication 3 MILLILITER(S): at 15:28

## 2019-03-14 RX ADMIN — Medication 60 MILLIGRAM(S): at 06:04

## 2019-03-14 RX ADMIN — PANTOPRAZOLE SODIUM 40 MILLIGRAM(S): 20 TABLET, DELAYED RELEASE ORAL at 06:04

## 2019-03-14 RX ADMIN — Medication 81 MILLIGRAM(S): at 12:12

## 2019-03-14 RX ADMIN — Medication 10 MILLIGRAM(S): at 06:04

## 2019-03-14 RX ADMIN — Medication 3 UNIT(S): at 12:11

## 2019-03-14 RX ADMIN — Medication 3 MILLILITER(S): at 09:30

## 2019-03-14 RX ADMIN — Medication 3 MILLILITER(S): at 20:57

## 2019-03-14 RX ADMIN — Medication 4: at 17:25

## 2019-03-14 RX ADMIN — ENOXAPARIN SODIUM 40 MILLIGRAM(S): 100 INJECTION SUBCUTANEOUS at 21:25

## 2019-03-14 RX ADMIN — SENNA PLUS 2 TABLET(S): 8.6 TABLET ORAL at 21:25

## 2019-03-14 RX ADMIN — ATORVASTATIN CALCIUM 80 MILLIGRAM(S): 80 TABLET, FILM COATED ORAL at 21:25

## 2019-03-14 RX ADMIN — Medication 3 UNIT(S): at 17:25

## 2019-03-14 NOTE — PROGRESS NOTE ADULT - ASSESSMENT
Intractable cor pulmonale  Patient has not been compliant with recommendations  She is DNR and there is no obvious remediable process here    Plan:  1.Advise palliative care/hospice  2.BIPAP nocturnally and PRN  3.Per cardiology>currently on IV lasix.

## 2019-03-14 NOTE — PROGRESS NOTE ADULT - ASSESSMENT
62 yo presenting to ED w/ SOB 2/2 diastolic HF exacerbation and noted to have KEITH and hyperkalemia, PMH CAD s/p CABG and PCI, PAD s/p baloon angioplasty, DM2, HTN, HLD, hx of cardiac arrest, recent GI bleed last month, Diastolic CHF, legally blind, pleural effusion requiring thoracentesis in Dec 2018, admitted for SOB, noted to have pleural effusion, started on bumex, s/p RRT noted on 3/1 for hypotension & lethargy. Pt became hypotensive after Bumex with albumin was given.  CXR with worsening plum edema b/l. BiPAP placed. ICU consult called for vasopressor support during IV diuresis. Midodrine ordered. She became increasingly lethargic with ABG showing resp acidosis with hypoxia. Pulmonary consult appreciated    course complicated by hemoptysis    course complicated by recurrent hf exac.     A/p    > Acute on Chronic Diastolic Heart Failure with severe PHTN  & right ventricular failure from OHS/ KYREE complicated by pulm edema & pleural effusions  -prior exac resolved, now with recurrence,  sp chest tube w/ drainage, removed by CT sx on 3/12, uncomplicated  - hold torsemide, resume lasix 60mg iv bid, strict i/o, will continue with lasix IV.   c/w coreg & Imdur, losartan as below  - ECHO shows EF of > 75%. Moderate to Severe TR, mod-severe PHTN  - cardio fu appreciated, can consider IR follow up to assess for pleurex catheter placement  patient is noncompliant with BIPAP, will get CXR in am.      > Hemoptysis:   per pt present for several days, no recurrence noted on 3/13   ? related to superficial lac in trachea from use of around the clock o2  h/h stable, resume asa + dvt ppx for now  serial h/h  repeat ct chest reviewed, no acute findings that require intervention noted  pulm fu appreciated    > Acute on Chronic Hypercapnic Respiratory Failure due to OHS/KYREE.   stable, Pulm recommend chronic NIV for chronic hypercarbic respiratory failure to improve symptoms and to decrease hospitalizations  c/w Bipap qhs  Eventual SINGH with NIV, c/w steroid taper, last day 3/16  Avoid Narcotics for pain.     > RT PTX  - small lesion, noted on CT  - sp chest tube placement + removal  - resolved    >UTI - appreciate ID input, sp completed course of invanz, resolved    >Dysphagia - had a RR due to aspiration, speech eval appreciated - recommend regular diet with thin liquid.   aspiration precautions    >Chest pain with hx of CAD & CABG/PCI-  sp PRBC to improve oxygen to myocardium  c/w Imdur, BB, statin  ASA as above   Plavix on hold since last admission due to GI Bleed.    > Lower GI Bleed likely diverticular-   - s/p 1 unit of PRBC, well tolerated, uncomplicated  - s/p Colonoscopy - Poor prep throughout the colon. Diverticulosis seen. Unable to visualize colon sufficiently  No further GI w/u recommended,   cleared by GI to use asa but current use as above  Plavix yet on hold, outpt F/U    >Hyperkalemia  - resolved  - Holding Losartan, resume as needed for HF + bp control    > Acute on chronic kidney injury - resolved  appreciate renal input - - cont Torsemide ==> will need to tolerate azotemia  ct hold losartan   f/u BMP     > CAD complicated by stents in 2014 / PAD / HLD / HTN  - Continue, Imdur, Lipitor, Coreg and Norvasc   - Plavix on hold since last admission due to GI Bleed.     > DM 2 not on long term insulin complicated by asymptomatic hyperglycemia  - HbA1c 5.6, goal < 8, controlled  - Lantus at 10 units at bedtime plus Humalog 3U   - c/w moderate sliding scale    > Anemia  - Multifactorial (chronic, complicated with acute gi bleeding during admit)  - s/p Venofer   - Continue Procrit  + stool for occult blood (will need GI work-up as outpt)  - Monitor CBC  - per renal goal hgb 10.     > Transaminitis- resolved  - Likely secondary to liver congestion    > History of Depression  - chronic, stable, uncomplicated  - Continue Duloxetine 60 mg    >B/L UE swelling  - Arm elevation  - Warm compresses   - No DVT     >Obesity in setting of Acute Moderate Protein Calorie Malnutrition  bmi 36.1  Nepro TID  Neprovite and Vit C 500mg daily  daily wt    DVT Prophylaxis -- hold in light of hemoptysis, may resume depending on outcome of w/u    >Goals of care - palliative on board   DNR  prognosis guarded, case discussed w/ palliative, will see if as time progresses if pt would like further info regarding hospice.     >dispo. PT eval appreciated, transfer to [facility] when medically stable after w/u of hemoptysis completed.

## 2019-03-14 NOTE — PROGRESS NOTE ADULT - SUBJECTIVE AND OBJECTIVE BOX
KUN ORCHA    [id number]    63y      Female    Patient is a 63y old  Female who presents with a chief complaint of SOB (14 Mar 2019 14:50)      INTERVAL HPI/OVERNIGHT EVENTS:    Patient is still SOB, denies fever, chills, chest pain, nausea, vomiting, dizziness     REVIEW OF SYSTEMS:    CONSTITUTIONAL: No fever, some fatigue  RESPIRATORY: No cough, has shortness of breath  CARDIOVASCULAR: No chest pain, palpitations  GASTROINTESTINAL: No abdominal, No nausea, vomiting  NEUROLOGICAL: No headaches,  loss of strength.  MISCELLANEOUS: No joint swelling or pain       Vital Signs Last 24 Hrs  T(C): 36.4 (14 Mar 2019 15:31), Max: 36.7 (13 Mar 2019 21:59)  T(F): 97.6 (14 Mar 2019 15:31), Max: 98 (13 Mar 2019 21:59)  HR: 66 (14 Mar 2019 17:26) (18 - 76)  BP: 115/66 (14 Mar 2019 17:26) (95/45 - 132/58)  RR: 16 (14 Mar 2019 17:26) (16 - 20)  SpO2: 100% (14 Mar 2019 17:26) (90% - 100%)    PHYSICAL EXAM:    GENERAL: Middle age male looking comfortable   HEENT: Legally blind   NECK: soft, Supple, No JVD,   CHEST/LUNG: decrease air entry bilaterally; No wheezing  HEART: S1S2+, Regular rate and rhythm; No murmurs  ABDOMEN: Soft, Nontender, Nondistended; Bowel sounds present  EXTREMITIES:  1+ Peripheral Pulses, has edema  SKIN: No rashes or lesions  NEURO: AAOX3, no focal deficits, no motor r sensory loss  PSYCH: normal mood      LABS:                        8.7    8.4   )-----------( 139      ( 13 Mar 2019 06:20 )             26.5     03-13    142  |  96<L>  |  92.0<H>  ----------------------------<  185<H>  4.2   |  38.0<H>  |  1.50<H>    Ca    8.4<L>      13 Mar 2019 06:20  Phos  2.4     03-13  Mg     2.5     03-13      PT/INR - ( 13 Mar 2019 06:20 )   PT: 13.5 sec;   INR: 1.17 ratio                 I&O's Summary    13 Mar 2019 07:01  -  14 Mar 2019 07:00  --------------------------------------------------------  IN: 630 mL / OUT: 900 mL / NET: -270 mL    14 Mar 2019 07:01  -  14 Mar 2019 17:57  --------------------------------------------------------  IN: 0 mL / OUT: 1125 mL / NET: -1125 mL        MEDICATIONS  (STANDING):  ALBUTerol    0.083% 10 milliGRAM(s) Nebulizer once  ALBUTerol/ipratropium for Nebulization 3 milliLiter(s) Nebulizer every 6 hours  amLODIPine   Tablet 5 milliGRAM(s) Oral daily  ascorbic acid 500 milliGRAM(s) Oral daily  aspirin  chewable 81 milliGRAM(s) Oral daily  atorvastatin 80 milliGRAM(s) Oral at bedtime  carvedilol 6.25 milliGRAM(s) Oral every 12 hours  dextrose 5%. 1000 milliLiter(s) (50 mL/Hr) IV Continuous <Continuous>  dextrose 50% Injectable 12.5 Gram(s) IV Push once  dextrose 50% Injectable 25 Gram(s) IV Push once  dextrose 50% Injectable 25 Gram(s) IV Push once  docusate sodium 100 milliGRAM(s) Oral two times a day  DULoxetine 60 milliGRAM(s) Oral daily  enoxaparin Injectable 40 milliGRAM(s) SubCutaneous daily  epoetin sara Injectable 75195 Unit(s) SubCutaneous <User Schedule>  furosemide   Injectable 60 milliGRAM(s) IV Push every 12 hours  gabapentin 100 milliGRAM(s) Oral three times a day  insulin glargine Injectable (LANTUS) 10 Unit(s) SubCutaneous at bedtime  insulin lispro (HumaLOG) corrective regimen sliding scale   SubCutaneous three times a day before meals  insulin lispro (HumaLOG) corrective regimen sliding scale   SubCutaneous at bedtime  insulin lispro Injectable (HumaLOG) 3 Unit(s) SubCutaneous three times a day before meals  isosorbide   mononitrate ER Tablet (IMDUR) 60 milliGRAM(s) Oral daily  magnesium oxide 400 milliGRAM(s) Oral daily  Nephro-riya 1 Tablet(s) Oral daily  pantoprazole    Tablet 40 milliGRAM(s) Oral before breakfast  polyethylene glycol 3350 17 Gram(s) Oral daily  predniSONE   Tablet 10 milliGRAM(s) Oral daily  predniSONE   Tablet   Oral   senna 2 Tablet(s) Oral at bedtime    MEDICATIONS  (PRN):  acetaminophen   Tablet .. 650 milliGRAM(s) Oral every 6 hours PRN Mild Pain (1 - 3)  dextrose 40% Gel 15 Gram(s) Oral once PRN Blood Glucose LESS THAN 70 milliGRAM(s)/deciliter  glucagon  Injectable 1 milliGRAM(s) IntraMuscular once PRN Glucose LESS THAN 70 milligrams/deciliter  glycopyrrolate Injectable 0.1 milliGRAM(s) IV Push every 8 hours PRN secreations  guaiFENesin    Syrup 100 milliGRAM(s) Oral every 6 hours PRN Cough  ondansetron Injectable 4 milliGRAM(s) IV Push every 6 hours PRN Nausea and/or Vomiting

## 2019-03-14 NOTE — PROGRESS NOTE ADULT - SUBJECTIVE AND OBJECTIVE BOX
PULMONARY PROGRESS NOTE      RANJIT ROCHA-[id number]    Patient is a 63y old  Female who presents with a chief complaint of SOB (13 Mar 2019 19:38)      INTERVAL HPI/OVERNIGHT EVENTS:  Patient doing poorly  Increased O2 requirements  Has not been compliant with nocturnal BIPAP  Currently in NAD on 98 GIN    MEDICATIONS  (STANDING):  ALBUTerol    0.083% 10 milliGRAM(s) Nebulizer once  ALBUTerol/ipratropium for Nebulization 3 milliLiter(s) Nebulizer every 6 hours  amLODIPine   Tablet 5 milliGRAM(s) Oral daily  ascorbic acid 500 milliGRAM(s) Oral daily  aspirin  chewable 81 milliGRAM(s) Oral daily  atorvastatin 80 milliGRAM(s) Oral at bedtime  carvedilol 6.25 milliGRAM(s) Oral every 12 hours  dextrose 5%. 1000 milliLiter(s) (50 mL/Hr) IV Continuous <Continuous>  dextrose 50% Injectable 12.5 Gram(s) IV Push once  dextrose 50% Injectable 25 Gram(s) IV Push once  dextrose 50% Injectable 25 Gram(s) IV Push once  docusate sodium 100 milliGRAM(s) Oral two times a day  DULoxetine 60 milliGRAM(s) Oral daily  enoxaparin Injectable 40 milliGRAM(s) SubCutaneous daily  epoetin sara Injectable 00610 Unit(s) SubCutaneous <User Schedule>  furosemide   Injectable 60 milliGRAM(s) IV Push every 12 hours  gabapentin 100 milliGRAM(s) Oral three times a day  insulin glargine Injectable (LANTUS) 10 Unit(s) SubCutaneous at bedtime  insulin lispro (HumaLOG) corrective regimen sliding scale   SubCutaneous three times a day before meals  insulin lispro (HumaLOG) corrective regimen sliding scale   SubCutaneous at bedtime  insulin lispro Injectable (HumaLOG) 3 Unit(s) SubCutaneous three times a day before meals  isosorbide   mononitrate ER Tablet (IMDUR) 60 milliGRAM(s) Oral daily  magnesium oxide 400 milliGRAM(s) Oral daily  Nephro-riya 1 Tablet(s) Oral daily  pantoprazole    Tablet 40 milliGRAM(s) Oral before breakfast  polyethylene glycol 3350 17 Gram(s) Oral daily  predniSONE   Tablet 10 milliGRAM(s) Oral daily  predniSONE   Tablet   Oral   senna 2 Tablet(s) Oral at bedtime      MEDICATIONS  (PRN):  acetaminophen   Tablet .. 650 milliGRAM(s) Oral every 6 hours PRN Mild Pain (1 - 3)  dextrose 40% Gel 15 Gram(s) Oral once PRN Blood Glucose LESS THAN 70 milliGRAM(s)/deciliter  glucagon  Injectable 1 milliGRAM(s) IntraMuscular once PRN Glucose LESS THAN 70 milligrams/deciliter  glycopyrrolate Injectable 0.1 milliGRAM(s) IV Push every 8 hours PRN secreations  guaiFENesin    Syrup 100 milliGRAM(s) Oral every 6 hours PRN Cough  ondansetron Injectable 4 milliGRAM(s) IV Push every 6 hours PRN Nausea and/or Vomiting      Allergies    Accupril (Other)    Intolerances        PAST MEDICAL & SURGICAL HISTORY:  Herniated disc, cervical  PAD (peripheral artery disease)  Legally blind  History of peripheral vascular disease  History of retinal detachment  History of CEA (carotid endarterectomy): Right  Hyperlipidemia  Glaucoma  Hypertension  Diabetes  S/P CABG x 1  After cataract  Uveitic glaucoma of both eyes  Detached retina  Atherosclerosis of right carotid artery   delivery delivered      SOCIAL HISTORY  Smoking History:       REVIEW OF SYSTEMS:    CONSTITUTIONAL:  No distress    HEENT:  Eyes:  No diplopia or blurred vision. ENT:  No earache, sore throat or runny nose.    CARDIOVASCULAR:  No pressure, squeezing, tightness, heaviness or aching about the chest; no palpitations.    RESPIRATORY:  Per HPI    GASTROINTESTINAL:  No nausea, vomiting or diarrhea.    GENITOURINARY:  No dysuria, frequency or urgency.    MUSCULOSKELETAL:  No joint pain    SKIN:  No new lesions.    NEUROLOGIC:  No paresthesias, fasciculations, seizures or weakness.    PSYCHIATRIC:  No disorder of thought or mood.    ENDOCRINE:  No heat or cold intolerance, polyuria or polydipsia.    HEMATOLOGICAL:  No easy bruising or bleeding.     Vital Signs Last 24 Hrs  T(C): 36.7 (14 Mar 2019 05:55), Max: 37.1 (13 Mar 2019 15:40)  T(F): 98 (14 Mar 2019 05:55), Max: 98.8 (13 Mar 2019 15:40)  HR: 67 (14 Mar 2019 10:05) (18 - 76)  BP: 95/45 (14 Mar 2019 10:05) (95/45 - 145/70)  BP(mean): --  RR: 16 (14 Mar 2019 10:05) (16 - 20)  SpO2: 91% (14 Mar 2019 10:05) (90% - 99%)    PHYSICAL EXAMINATION:    GENERAL: The patient is awake and alert in no apparent distress.     HEENT: Head is normocephalic and atraumatic. Extraocular muscles are intact. Mucous membranes are moist.    NECK: Supple.    LUNGS: Clear to auscultation without wheezing, rales or rhonchi; respirations unlabored; diminished right     HEART: Regular rate and rhythm without murmur.    ABDOMEN: Soft, nontender, and nondistended.      EXTREMITIES: Without any cyanosis, clubbing, rash, lesions or edema.    NEUROLOGIC: Grossly intact.    SKIN: No ulceration or induration present.      LABS:                        8.7    8.4   )-----------( 139      ( 13 Mar 2019 06:20 )             26.5         142  |  96<L>  |  92.0<H>  ----------------------------<  185<H>  4.2   |  38.0<H>  |  1.50<H>    Ca    8.4<L>      13 Mar 2019 06:20  Phos  2.4       Mg     2.5           PT/INR - ( 13 Mar 2019 06:20 )   PT: 13.5 sec;   INR: 1.17 ratio                     Serum Pro-Brain Natriuretic Peptide: 86998 pg/mL (19 @ 17:35)      Procalcitonin, Serum: 0.21 ng/mL (19 @ 12:55)      MICROBIOLOGY:    RADIOLOGY & ADDITIONAL STUDIES:

## 2019-03-15 LAB
ANION GAP SERPL CALC-SCNC: 8 MMOL/L — SIGNIFICANT CHANGE UP (ref 5–17)
APPEARANCE UR: ABNORMAL
BACTERIA # UR AUTO: ABNORMAL
BASE EXCESS BLDA CALC-SCNC: 19.9 MMOL/L — HIGH (ref -2–2)
BILIRUB UR-MCNC: NEGATIVE — SIGNIFICANT CHANGE UP
BLOOD GAS COMMENTS ARTERIAL: SIGNIFICANT CHANGE UP
BUN SERPL-MCNC: 99 MG/DL — HIGH (ref 8–20)
CALCIUM SERPL-MCNC: 8 MG/DL — LOW (ref 8.6–10.2)
CHLORIDE SERPL-SCNC: 97 MMOL/L — LOW (ref 98–107)
CO2 SERPL-SCNC: 38 MMOL/L — HIGH (ref 22–29)
COLOR SPEC: YELLOW — SIGNIFICANT CHANGE UP
CREAT SERPL-MCNC: 1.46 MG/DL — HIGH (ref 0.5–1.3)
DIFF PNL FLD: ABNORMAL
EPI CELLS # UR: SIGNIFICANT CHANGE UP
GAS PNL BLDA: SIGNIFICANT CHANGE UP
GLUCOSE BLDC GLUCOMTR-MCNC: 140 MG/DL — HIGH (ref 70–99)
GLUCOSE BLDC GLUCOMTR-MCNC: 168 MG/DL — HIGH (ref 70–99)
GLUCOSE BLDC GLUCOMTR-MCNC: 81 MG/DL — SIGNIFICANT CHANGE UP (ref 70–99)
GLUCOSE BLDC GLUCOMTR-MCNC: 94 MG/DL — SIGNIFICANT CHANGE UP (ref 70–99)
GLUCOSE SERPL-MCNC: 155 MG/DL — HIGH (ref 70–115)
GLUCOSE UR QL: NEGATIVE MG/DL — SIGNIFICANT CHANGE UP
HCO3 BLDA-SCNC: 44 MMOL/L — HIGH (ref 20–26)
HCT VFR BLD CALC: 24.1 % — LOW (ref 37–47)
HGB BLD-MCNC: 8.1 G/DL — LOW (ref 12–16)
HOROWITZ INDEX BLDA+IHG-RTO: 60 — SIGNIFICANT CHANGE UP
KETONES UR-MCNC: NEGATIVE — SIGNIFICANT CHANGE UP
LEUKOCYTE ESTERASE UR-ACNC: ABNORMAL
MCHC RBC-ENTMCNC: 29.9 PG — SIGNIFICANT CHANGE UP (ref 27–31)
MCHC RBC-ENTMCNC: 33.6 G/DL — SIGNIFICANT CHANGE UP (ref 32–36)
MCV RBC AUTO: 88.9 FL — SIGNIFICANT CHANGE UP (ref 81–99)
NITRITE UR-MCNC: NEGATIVE — SIGNIFICANT CHANGE UP
PCO2 BLDA: 57 MMHG — HIGH (ref 35–45)
PH BLDA: 7.51 — HIGH (ref 7.35–7.45)
PH UR: 8 — SIGNIFICANT CHANGE UP (ref 5–8)
PLATELET # BLD AUTO: 125 K/UL — LOW (ref 150–400)
PO2 BLDA: 53 MMHG — LOW (ref 83–108)
POTASSIUM SERPL-MCNC: 4.2 MMOL/L — SIGNIFICANT CHANGE UP (ref 3.5–5.3)
POTASSIUM SERPL-SCNC: 4.2 MMOL/L — SIGNIFICANT CHANGE UP (ref 3.5–5.3)
PROT UR-MCNC: 30 MG/DL
RBC # BLD: 2.71 M/UL — LOW (ref 4.4–5.2)
RBC # FLD: 16.7 % — HIGH (ref 11–15.6)
RBC CASTS # UR COMP ASSIST: >50 /HPF (ref 0–4)
SAO2 % BLDA: 90 % — LOW (ref 95–99)
SODIUM SERPL-SCNC: 143 MMOL/L — SIGNIFICANT CHANGE UP (ref 135–145)
SP GR SPEC: 1.01 — SIGNIFICANT CHANGE UP (ref 1.01–1.02)
UROBILINOGEN FLD QL: 1 MG/DL
WBC # BLD: 5.9 K/UL — SIGNIFICANT CHANGE UP (ref 4.8–10.8)
WBC # FLD AUTO: 5.9 K/UL — SIGNIFICANT CHANGE UP (ref 4.8–10.8)
WBC UR QL: ABNORMAL

## 2019-03-15 PROCEDURE — 99233 SBSQ HOSP IP/OBS HIGH 50: CPT

## 2019-03-15 PROCEDURE — 99497 ADVNCD CARE PLAN 30 MIN: CPT

## 2019-03-15 PROCEDURE — 12345: CPT | Mod: NC

## 2019-03-15 PROCEDURE — 71045 X-RAY EXAM CHEST 1 VIEW: CPT | Mod: 26

## 2019-03-15 PROCEDURE — 99221 1ST HOSP IP/OBS SF/LOW 40: CPT

## 2019-03-15 RX ORDER — FUROSEMIDE 40 MG
40 TABLET ORAL ONCE
Qty: 0 | Refills: 0 | Status: COMPLETED | OUTPATIENT
Start: 2019-03-15 | End: 2019-03-15

## 2019-03-15 RX ORDER — HYDRALAZINE HCL 50 MG
10 TABLET ORAL EVERY 4 HOURS
Qty: 0 | Refills: 0 | Status: DISCONTINUED | OUTPATIENT
Start: 2019-03-15 | End: 2019-03-26

## 2019-03-15 RX ADMIN — Medication 10 MILLIGRAM(S): at 06:16

## 2019-03-15 RX ADMIN — Medication 2: at 08:33

## 2019-03-15 RX ADMIN — GABAPENTIN 100 MILLIGRAM(S): 400 CAPSULE ORAL at 06:16

## 2019-03-15 RX ADMIN — Medication 650 MILLIGRAM(S): at 11:24

## 2019-03-15 RX ADMIN — Medication 100 MILLIGRAM(S): at 06:16

## 2019-03-15 RX ADMIN — Medication 3 UNIT(S): at 08:33

## 2019-03-15 RX ADMIN — INSULIN GLARGINE 20 UNIT(S): 100 INJECTION, SOLUTION SUBCUTANEOUS at 22:28

## 2019-03-15 RX ADMIN — Medication 125 MILLIGRAM(S): at 20:04

## 2019-03-15 RX ADMIN — Medication 3 MILLILITER(S): at 07:44

## 2019-03-15 RX ADMIN — Medication 650 MILLIGRAM(S): at 11:22

## 2019-03-15 RX ADMIN — Medication 3 MILLILITER(S): at 13:58

## 2019-03-15 RX ADMIN — CARVEDILOL PHOSPHATE 6.25 MILLIGRAM(S): 80 CAPSULE, EXTENDED RELEASE ORAL at 06:17

## 2019-03-15 RX ADMIN — Medication 3 MILLILITER(S): at 03:40

## 2019-03-15 RX ADMIN — PANTOPRAZOLE SODIUM 40 MILLIGRAM(S): 20 TABLET, DELAYED RELEASE ORAL at 06:16

## 2019-03-15 RX ADMIN — Medication 60 MILLIGRAM(S): at 06:17

## 2019-03-15 RX ADMIN — Medication 40 MILLIGRAM(S): at 14:23

## 2019-03-15 RX ADMIN — Medication 60 MILLIGRAM(S): at 17:33

## 2019-03-15 RX ADMIN — ENOXAPARIN SODIUM 40 MILLIGRAM(S): 100 INJECTION SUBCUTANEOUS at 22:28

## 2019-03-15 RX ADMIN — Medication 3 MILLILITER(S): at 20:11

## 2019-03-15 RX ADMIN — Medication 650 MILLIGRAM(S): at 11:21

## 2019-03-15 NOTE — PROGRESS NOTE ADULT - NSICDXPROBLEM_GEN_ALL_CORE_FT
PROBLEM DIAGNOSES  Problem: Functional quadriplegia  Assessment and Plan: Assist [facility] care    Problem: Encounter for palliative care  Assessment and Plan: Discussed with Dr. Hendrix  Patient requiring continuous Bipap -poor overall outlook.   not pressent - Dr. Hendrix called ,unable to be reached.  Called daughter - informed of current poor condition.  Urged her to  come to hospital to further discuss plan of care with patient's .  She cannot right now because she is at work.   Family has been discussed HOPSICE in previous meeting 2/28/19.     Problem: Acute on chronic diastolic (congestive) heart failure  Assessment and Plan:     Problem: Pleural effusion  Assessment and Plan: s/p CT - removed    Problem: Acute on chronic respiratory failure with hypoxia and hypercapnia  Assessment and Plan:     Problem: Obesity hypoventilation syndrome  Assessment and Plan: PROBLEM DIAGNOSES  Problem: Functional quadriplegia  Assessment and Plan: Assist [facility] care    Problem: Encounter for palliative care  Assessment and Plan: Discussed with Dr. Hendrix  Patient requiring continuous Bipap -poor overall outlook.   not pressent - Dr. Hendrix called ,unable to be reached.  Called daughter - informed of current poor condition.  Urged her to  come to hospital to further discuss plan of care with patient's .  She cannot right now because she is at work.   Family has been discussed HOPSICE in previous meeting 2/28/19.     Problem: Acute on chronic diastolic (congestive) heart failure  Assessment and Plan: cont Lasix    Problem: Pleural effusion  Assessment and Plan: s/p CT - removed    Problem: Acute on chronic respiratory failure with hypoxia and hypercapnia  Assessment and Plan: On Bipap  MICU consult    Problem: Obesity hypoventilation syndrome  Assessment and Plan: O2 support    Problem: Acute respiratory failure with hypercapnia  Assessment and Plan: on continuous Bipap  ICU consult called

## 2019-03-15 NOTE — CONSULT NOTE ADULT - PROVIDER SPECIALTY LIST ADULT
CT Surgery
Cardiology
Critical Care
Critical Care
Nephrology
Palliative Care
Pulmonology
Urology
Critical Care
Gastroenterology
Infectious Disease

## 2019-03-15 NOTE — CONSULT NOTE ADULT - ASSESSMENT
62 yo female with morbid obesity, CAD s/p CABG and PCI, PVD, DM, HTN, HLD, prior cardiac arrest, CHFpEF, cor pulmonale, admitted Feb 11 with dyspnea and diastolic CHF exacerbation.  Patient is now on continuous bipap, unable to wean.  Initially DNR/DNI but family changed their mind.  The CT chest shows worsening airspace opacities compared to prior CT chest, her CXR also seems to be worsening.  These changes seem to coincide with tapering of steroids.  She may have a cryptogenic organizing pneumonia or eosinophilic pneumonia given the absence of signs of infection (no fever, leukocytosis, or sputum).      Plan:  - restart steroids  - if worsens then will intubate and perform bronchoscopy to rule out infection  - check procalcitonin   - bedside US to determine size of pleural effusion  Patient informed that if she gets intubated there is high probability of requiring tracheostomy. 64 yo female with morbid obesity, CAD s/p CABG and PCI, PVD, DM, HTN, HLD, prior cardiac arrest, CHFpEF, cor pulmonale, admitted Feb 11 with dyspnea and diastolic CHF exacerbation.  Patient is now on continuous bipap with worsening acute on chronic respiratory failure, unable to wean.  Initially DNR/DNI but family changed their mind.  The CT chest shows worsening airspace opacities compared to prior CT chest, her CXR also seems to be worsening.  These changes seem to coincide with tapering of steroids.  She may have a cryptogenic organizing pneumonia or eosinophilic pneumonia given the absence of signs of infection (no fever, leukocytosis, or sputum).      Plan:  - restart steroids  - if worsens then will intubate and perform bronchoscopy to rule out infection  - check procalcitonin   - bedside US to determine size of pleural effusion  Patient informed that if she gets intubated there is high probability of requiring tracheostomy.

## 2019-03-15 NOTE — CONSULT NOTE ADULT - SUBJECTIVE AND OBJECTIVE BOX
KUN ROCHA  [id number]  63y, Female    Heart failure      Patient is a 63y old  Female who presents with a chief complaint of SOB (15 Mar 2019 14:08)      HPI:  Pt is a 64 yo presenting to ED w/ SOB, PMH CAD s/p CABG and PCI, PAD s/p baloon angioplasty, DM2, HTN, HLD, hx of cardiac arrest, recent GI bleed last month, Diastolic CHF, legally blind, pleural effusion requiring thoracentesis in Dec 2018.   Patient states that she has been having increasing SOB over the last week, she lives at home with her family, denies sick contacts, but has been having more exertional dyspnea. She states she walks with a walker but it is taking less activity for her dyspnea to occur. She has been compliant with all of her medications, she does not recall the names, but states that there have been no changes to her medication regimen since her discharge. She denies any associated chest pain or pressure. She has been taking her tosemide she believes. Denies PND or orthopnea, but she has noted increased LE edema.   Denies headaches, nausea, vomiting, chest pain, palpitations, abdominal pain, constipation, diarrhea, melena, hematochezia, dysuria.   Her SOB has since resolved. She is breathing comfortably, has no complaints.  In ED patient was noted to have hyperkalemia on repeat labs, troponin elevated at 0.09. Her creatinine is also elevated. She did not receive any potassium treatment initially so I ordered Calcium gluc, kayexelate, Insulin, dextrose, Albuterol. EKG without widened QRS or peaked T waves. (2019 03:58)    3/15/2019  Patient seen at bedside. We were called due to gross hematuria. patient now DNR and on BIPAP with severe respiratory issues. Noted to have hematuria and has some mile left flank discomfort. no feelng of bladder fullness. no drainage around fontenot.  Was noted to have history of stones on CT from January of this year.     Allergies    Accupril (Other)    Intolerances        MEDICATIONS  (STANDING):  ALBUTerol    0.083% 10 milliGRAM(s) Nebulizer once  ALBUTerol/ipratropium for Nebulization 3 milliLiter(s) Nebulizer every 6 hours  amLODIPine   Tablet 5 milliGRAM(s) Oral daily  ascorbic acid 500 milliGRAM(s) Oral daily  aspirin  chewable 81 milliGRAM(s) Oral daily  atorvastatin 80 milliGRAM(s) Oral at bedtime  carvedilol 6.25 milliGRAM(s) Oral every 12 hours  dextrose 5%. 1000 milliLiter(s) (50 mL/Hr) IV Continuous <Continuous>  dextrose 50% Injectable 12.5 Gram(s) IV Push once  dextrose 50% Injectable 25 Gram(s) IV Push once  dextrose 50% Injectable 25 Gram(s) IV Push once  docusate sodium 100 milliGRAM(s) Oral two times a day  DULoxetine 60 milliGRAM(s) Oral daily  enoxaparin Injectable 40 milliGRAM(s) SubCutaneous daily  epoetin sara Injectable 45613 Unit(s) SubCutaneous <User Schedule>  furosemide   Injectable 60 milliGRAM(s) IV Push every 12 hours  gabapentin 100 milliGRAM(s) Oral three times a day  insulin glargine Injectable (LANTUS) 20 Unit(s) SubCutaneous at bedtime  insulin lispro (HumaLOG) corrective regimen sliding scale   SubCutaneous three times a day before meals  insulin lispro (HumaLOG) corrective regimen sliding scale   SubCutaneous at bedtime  insulin lispro Injectable (HumaLOG) 3 Unit(s) SubCutaneous three times a day before meals  isosorbide   mononitrate ER Tablet (IMDUR) 60 milliGRAM(s) Oral daily  magnesium oxide 400 milliGRAM(s) Oral daily  Nephro-riya 1 Tablet(s) Oral daily  pantoprazole    Tablet 40 milliGRAM(s) Oral before breakfast  polyethylene glycol 3350 17 Gram(s) Oral daily  senna 2 Tablet(s) Oral at bedtime    MEDICATIONS  (PRN):  acetaminophen   Tablet .. 650 milliGRAM(s) Oral every 6 hours PRN Mild Pain (1 - 3)  dextrose 40% Gel 15 Gram(s) Oral once PRN Blood Glucose LESS THAN 70 milliGRAM(s)/deciliter  glucagon  Injectable 1 milliGRAM(s) IntraMuscular once PRN Glucose LESS THAN 70 milligrams/deciliter  glycopyrrolate Injectable 0.1 milliGRAM(s) IV Push every 8 hours PRN secreations  guaiFENesin    Syrup 100 milliGRAM(s) Oral every 6 hours PRN Cough  ondansetron Injectable 4 milliGRAM(s) IV Push every 6 hours PRN Nausea and/or Vomiting      PAST MEDICAL & SURGICAL HISTORY:  Herniated disc, cervical  PAD (peripheral artery disease)  Legally blind  History of peripheral vascular disease  History of retinal detachment  History of CEA (carotid endarterectomy): Right  Hyperlipidemia  Glaucoma  Hypertension  Diabetes  S/P CABG x 1  After cataract  Uveitic glaucoma of both eyes  Detached retina  Atherosclerosis of right carotid artery   delivery delivered      REVIEW OF SYSTEMS      General: appears uncomfortable	  	  Ophthalmologic: blind in right eye    Respiratory and Thorax: difficulty  with SOB  	  Cardiovascular:	 no chest pain    Gastrointestinal:	left flank pain    Genitourinary:	has po    Tob:                              EtOH:    I&O's Detail    14 Mar 2019 07:01  -  15 Mar 2019 07:00  --------------------------------------------------------  IN:  Total IN: 0 mL    OUT:    Indwelling Catheter - Urethral: 2975 mL  Total OUT: 2975 mL    Total NET: -2975 mL      15 Mar 2019 07:01  -  15 Mar 2019 15:01  --------------------------------------------------------  IN:  Total IN: 0 mL    OUT:    Voided: 1000 mL  Total OUT: 1000 mL    Total NET: -1000 mL          PE:    Vital Signs Last 24 Hrs  T(C): 36.7 (15 Mar 2019 10:00), Max: 37 (14 Mar 2019 21:23)  T(F): 98 (15 Mar 2019 10:00), Max: 98.6 (14 Mar 2019 21:23)  HR: 62 (15 Mar 2019 14:24) (62 - 68)  BP: 106/62 (15 Mar 2019 14:24) (106/62 - 121/62)  BP(mean): --  RR: 19 (15 Mar 2019 14:24) (12 - 19)  SpO2: 93% (15 Mar 2019 14:24) (68% - 100%)    Daily     Daily Weight in k.1 (15 Mar 2019 05:01)    PHYSICAL EXAM:      Constitutional:    Eyes: clouded cornea onright and no conjugate gaze    ENMT: normal nares, BIPAP in place    Respiratory: using accessory muscles    Cardiovascular: slow cap refill    Genitourinary: fontenot in place, urine pink    Skin: no jaundice    Psychiatric: alert and pleasant        Labs:                          8.1    5.9   )-----------( 125      ( 15 Mar 2019 09:25 )             24.1       -15    143  |  97<L>  |  99.0<H>  ----------------------------<  155<H>  4.2   |  38.0<H>  |  1.46<H>    Ca    8.0<L>      15 Mar 2019 09:25            Impression:    gross hematuria, minimal  Patient was admitted with fontenot which means at the very least she had bladder colonization at time of admission.      Plan:    urine culture  consider culture appropriate Abx if urine does not clear.   Hand irrigate fontenot with normal saline PRN    Gaston Garcia MD  03-15-19 @ 15:01

## 2019-03-15 NOTE — PROGRESS NOTE ADULT - ASSESSMENT
62 yo presenting to ED w/ SOB 2/2 diastolic HF exacerbation and noted to have KEITH and hyperkalemia, PMH CAD s/p CABG and PCI, PAD s/p baloon angioplasty, DM2, HTN, HLD, hx of cardiac arrest, recent GI bleed last month, Diastolic CHF, legally blind, pleural effusion requiring thoracentesis in Dec 2018, admitted for SOB, noted to have pleural effusion, started on bumex, s/p RRT noted on 3/1 for hypotension & lethargy. Pt became hypotensive after Bumex with albumin was given.  CXR with worsening plum edema b/l. BiPAP placed. ICU consult called for vasopressor support during IV diuresis. Midodrine ordered. She became increasingly lethargic with ABG showing resp acidosis with hypoxia. Pulmonary consult appreciated    course complicated by hemoptysis    course complicated by recurrent hf exac.     A/p    > Acute on Chronic Diastolic Heart Failure with severe PHTN  & right ventricular failure from OHS/ KYREE complicated by pulm edema & pleural effusions  -prior exac resolved, now with recurrence,  sp chest tube w/ drainage, removed by CT sx on 3/12, uncomplicated  - held torsemide, resume lasix 60mg iv bid, strict i/o, continued with lasix IV, will give additional 40mg of Lasix, c/w coreg & Imdur, losartan, ECHO shows EF of > 75%. Moderate to Severe TR, mod-severe PHTN, Cardio on board, can consider IR follow up to assess for pleurex catheter placement, CXR repeated looks like worsening of effusion, she require 100% oxygen, spoke with CT surgery to see if she needs tube again, as well as tried to call the family all the number no one picked up, also discuss with palliative care team    > Hemoptysis:   per pt present for several days, no recurrence noted on 3/13   ? related to superficial lac in trachea from use of around the clock o2  h/h stable, resume asa + dvt ppx for now  serial h/h  repeat ct chest reviewed, no acute findings that require intervention noted  pulm fu appreciated, no more episode.     > Acute on Chronic Hypercapnic Respiratory Failure due to OHS/KYREE.   stable, Pulm recommend chronic NIV for chronic hypercarbic respiratory failure to improve symptoms and to decrease hospitalizations  c/w Bipap, she require all the time, Eventual SINGH with NIV, c/w steroid taper, last day 3/16  Avoid Narcotics for pain.    > RT PTX  - small lesion, noted on CT  - sp chest tube placement + removal  - resolved    >UTI - appreciate ID input, sp completed course of invanz, resolved    >Dysphagia - had a RR due to aspiration, speech eval appreciated - recommend regular diet with thin liquid.   aspiration precautions    >Chest pain with hx of CAD & CABG/PCI-  sp PRBC to improve oxygen to myocardium  c/w Imdur, BB, statin  ASA as above   Plavix on hold since last admission due to GI Bleed.    > Lower GI Bleed likely diverticular-   - s/p 1 unit of PRBC, well tolerated, uncomplicated  - s/p Colonoscopy - Poor prep throughout the colon. Diverticulosis seen. Unable to visualize colon sufficiently  No further GI w/u recommended,   cleared by GI to use asa but current use as above  Plavix yet on hold, outpt F/U    >Hyperkalemia  - resolved  - Holding Losartan, resume as needed for HF + bp control    > Acute on chronic kidney injury - resolved  appreciate renal input - - cont Torsemide ==> will need to tolerate azotemia  ct hold losartan   f/u BMP     > CAD complicated by stents in 2014 / PAD / HLD / HTN  - Continue, Imdur, Lipitor, Coreg and Norvasc   - Plavix on hold since last admission due to GI Bleed.     > DM 2 not on long term insulin complicated by asymptomatic hyperglycemia  - HbA1c 5.6, goal < 8, controlled  - Lantus at 10 units at bedtime plus Humalog 3U   - c/w moderate sliding scale    > Anemia  - Multifactorial (chronic, complicated with acute gi bleeding during admit)  - s/p Venofer   - Continue Procrit  + stool for occult blood (will need GI work-up as outpt)  - Monitor CBC  - per renal goal hgb 10.     > Transaminitis- resolved  - Likely secondary to liver congestion    > History of Depression  - chronic, stable, uncomplicated  - Continue Duloxetine 60 mg    >B/L UE swelling  - Arm elevation  - Warm compresses   - No DVT     >Obesity in setting of Acute Moderate Protein Calorie Malnutrition  bmi 36.1  Nepro TID  Neprovite and Vit C 500mg daily  daily wt.     hematuria: Urology consulted, patient has fontenot's cathter, will follow urology.     DVT Prophylaxis -- hold in light of hemoptysis, may resume depending on outcome of w/u    >Goals of care - palliative on board   DNR  prognosis guarded, case discussed w/ palliative, will see if as time progresses if pt would like further info regarding hospice.     >dispo. PT eval appreciated, transfer to [facility] when medically stable after w/u of hemoptysis completed.

## 2019-03-15 NOTE — CONSULT NOTE ADULT - SUBJECTIVE AND OBJECTIVE BOX
Critical care evaluation  We were called to evaluate this patient known to me from prior consultation due to increasing fio2 requirements, inability to come off bipap in 3 days.      INTERVAL HPI: Patient was recently tapered off steroids and since then has been having increasing dyspnea, worsening airspace opacities, now requiring bipap around the clock.  Hospitalist spoke with family who rescinded the DNR and requested that "everything be done" to prolong her life.  I was unable to reach family but spoke with the patient who seems agreeable to intubation.    On Admission  19 (32d)  HPI:  Pt is a 64 yo presenting to ED w/ SOB, PMH CAD s/p CABG and PCI, PAD s/p baloon angioplasty, DM2, HTN, HLD, hx of cardiac arrest, recent GI bleed last month, Diastolic CHF, legally blind, pleural effusion requiring thoracentesis in Dec 2018.   Patient states that she has been having increasing SOB over the last week, she lives at home with her family, denies sick contacts, but has been having more exertional dyspnea. She states she walks with a walker but it is taking less activity for her dyspnea to occur. She has been compliant with all of her medications, she does not recall the names, but states that there have been no changes to her medication regimen since her discharge. She denies any associated chest pain or pressure. She has been taking her tosemide she believes. Denies PND or orthopnea, but she has noted increased LE edema.   Denies headaches, nausea, vomiting, chest pain, palpitations, abdominal pain, constipation, diarrhea, melena, hematochezia, dysuria.   Her SOB has since resolved. She is breathing comfortably, has no complaints.  In ED patient was noted to have hyperkalemia on repeat labs, troponin elevated at 0.09. Her creatinine is also elevated. She did not receive any potassium treatment initially so I ordered Calcium gluc, kayexelate, Insulin, dextrose, Albuterol. EKG without widened QRS or peaked T waves. (2019 03:58)    PAST MEDICAL & SURGICAL HISTORY:  Herniated disc, cervical  PAD (peripheral artery disease)  Legally blind  History of peripheral vascular disease  History of retinal detachment  History of CEA (carotid endarterectomy): Right  Hyperlipidemia  Glaucoma  Hypertension  Diabetes  S/P CABG x 1  After cataract  Uveitic glaucoma of both eyes  Detached retina  Atherosclerosis of right carotid artery   delivery delivered      Antimicrobial:    Cardiovascular:  amLODIPine   Tablet 5 milliGRAM(s) Oral daily  carvedilol 6.25 milliGRAM(s) Oral every 12 hours  furosemide   Injectable 60 milliGRAM(s) IV Push every 12 hours  isosorbide   mononitrate ER Tablet (IMDUR) 60 milliGRAM(s) Oral daily    Pulmonary:  ALBUTerol    0.083% 10 milliGRAM(s) Nebulizer once  ALBUTerol/ipratropium for Nebulization 3 milliLiter(s) Nebulizer every 6 hours  guaiFENesin    Syrup 100 milliGRAM(s) Oral every 6 hours PRN    Hematalogic:  aspirin  chewable 81 milliGRAM(s) Oral daily  enoxaparin Injectable 40 milliGRAM(s) SubCutaneous daily    Other:  acetaminophen   Tablet .. 650 milliGRAM(s) Oral every 6 hours PRN  ascorbic acid 500 milliGRAM(s) Oral daily  atorvastatin 80 milliGRAM(s) Oral at bedtime  dextrose 40% Gel 15 Gram(s) Oral once PRN  dextrose 5%. 1000 milliLiter(s) IV Continuous <Continuous>  dextrose 50% Injectable 12.5 Gram(s) IV Push once  dextrose 50% Injectable 25 Gram(s) IV Push once  dextrose 50% Injectable 25 Gram(s) IV Push once  docusate sodium 100 milliGRAM(s) Oral two times a day  DULoxetine 60 milliGRAM(s) Oral daily  epoetin sara Injectable 34466 Unit(s) SubCutaneous <User Schedule>  gabapentin 100 milliGRAM(s) Oral three times a day  glucagon  Injectable 1 milliGRAM(s) IntraMuscular once PRN  glycopyrrolate Injectable 0.1 milliGRAM(s) IV Push every 8 hours PRN  insulin glargine Injectable (LANTUS) 20 Unit(s) SubCutaneous at bedtime  insulin lispro (HumaLOG) corrective regimen sliding scale   SubCutaneous at bedtime  insulin lispro Injectable (HumaLOG) 3 Unit(s) SubCutaneous three times a day before meals  magnesium oxide 400 milliGRAM(s) Oral daily  Nephro-riya 1 Tablet(s) Oral daily  ondansetron Injectable 4 milliGRAM(s) IV Push every 6 hours PRN  pantoprazole    Tablet 40 milliGRAM(s) Oral before breakfast  polyethylene glycol 3350 17 Gram(s) Oral daily  senna 2 Tablet(s) Oral at bedtime      Drug Dosing Weight  Height (cm): 162.56 (10 Feb 2019 18:12)  Weight (kg): 95.3 (2019 06:15)  BMI (kg/m2): 36.1 (2019 06:15)  BSA (m2): 2 (2019 06:15)    T(C): 36.3 (03-15-19 @ 16:30), Max: 37 (19 @ 21:23)  HR: 63 (03-15-19 @ 20:40)  BP: 137/75 (03-15-19 @ 20:40)  BP(mean): 99 (03-15-19 @ 20:40)  ABP: --  ABP(mean): --  RR: 32 (03-15-19 @ 20:40)  SpO2: 95% (03-15-19 @ 20:40)    ABG - ( 15 Mar 2019 16:58 )  pH, Arterial: 7.51  pH, Blood: x     /  pCO2: 57    /  pO2: 53    / HCO3: 44    / Base Excess: 19.9  /  SaO2: 90                     @ 07:01  -  03-15 @ 07:00  --------------------------------------------------------  IN: 0 mL / OUT: 2975 mL / NET: -2975 mL            PHYSICAL EXAM:    GENERAL: NAD, on bipap  HEAD:  Atraumatic  EYES: EOMI, right eye opacified  ENMT:  MMM,  NECK:   trachea midline  NERVOUS SYSTEM:  Alert & Oriented  CHEST/LUNG: Bilateral air entry, distant breath sounds   HEART: Regular rate, regular rhythm; systolic murmur   ABDOMEN: Soft, Nontender, no rebound  EXTREMITIES:  greatly improved edema  LYMPH:  No lymphadenopathy noted  SKIN:  , good capillary refill  PSYCH: appropriate affect and behavior    LABS:  CBC Full  -  ( 15 Mar 2019 09:25 )  WBC Count : 5.9 K/uL  Hemoglobin : 8.1 g/dL  Hematocrit : 24.1 %  Platelet Count - Automated : 125 K/uL  Mean Cell Volume : 88.9 fl  Mean Cell Hemoglobin : 29.9 pg  Mean Cell Hemoglobin Concentration : 33.6 g/dL  Auto Neutrophil # : x  Auto Lymphocyte # : x  Auto Monocyte # : x  Auto Eosinophil # : x  Auto Basophil # : x  Auto Neutrophil % : x  Auto Lymphocyte % : x  Auto Monocyte % : x  Auto Eosinophil % : x  Auto Basophil % : x    03-15    143  |  97<L>  |  99.0<H>  ----------------------------<  155<H>  4.2   |  38.0<H>  |  1.46<H>    Ca    8.0<L>      15 Mar 2019 09:25        Urinalysis Basic - ( 15 Mar 2019 14:46 )    Color: Yellow / Appearance: Cloudy / S.010 / pH: x  Gluc: x / Ketone: Negative  / Bili: Negative / Urobili: 1 mg/dL   Blood: x / Protein: 30 mg/dL / Nitrite: Negative   Leuk Esterase: Moderate / RBC: >50 /HPF / WBC 6-10   Sq Epi: x / Non Sq Epi: Occasional / Bacteria: Occasional          RADIOLOGY personally reviewed Recent CT chest - bilateral airspace opacities along with ground glass opacities, worse compared to prior, moderate right pleural effusion    GOALS OF CARE DISCUSSION WITH PATIENT/FAMILY/PROXY: unable to reach family

## 2019-03-15 NOTE — CHART NOTE - NSCHARTNOTEFT_GEN_A_CORE
patient was noted to have worsening SOB and she require 80% of oxygen on BiPAP, ABG obtained and was continued with BiPAP, gave her extra 40mg of lasix IV, called the family, left voice msges, did not get call back, later evening time around 6 pm,  came in and spoke with him in detail in presence of patient and he called his tow daughter they joined meeting on the phone, all details provided about the patient worsening SOB and respiratory failure, they were told she has very poor prognosis, given she has sever heart failure as well as sever pulmonary HTN and we went over the code status, as she was DNR and DNI with Molestrellita in the chart, patient expressed her wishes that she wanted to have every thing done to keep her alive and she wanted to fight with her illness and as per her she have unfavorable heart and lung condition but still wanted to fight, DNR and DNI was revoked and ICU consult was called.   total time spent with family meeting 50 minutes.

## 2019-03-15 NOTE — PROGRESS NOTE ADULT - SUBJECTIVE AND OBJECTIVE BOX
KUN ROCHA    [id number]    63y      Female    Patient is a 63y old  Female who presents with a chief complaint of SOB (15 Mar 2019 14:08)      INTERVAL HPI/OVERNIGHT EVENTS:    Patient is still SOB looks like maxed out with her BiPAP setting, CXR shows worsening or effusion, denies fever, chills, chest pain, nausea, vomiting, dizziness     REVIEW OF SYSTEMS:    CONSTITUTIONAL: No fever, some fatigue  RESPIRATORY: No cough, has shortness of breath  CARDIOVASCULAR: No chest pain, palpitations  GASTROINTESTINAL: No abdominal, No nausea, vomiting  NEUROLOGICAL: No headaches,  loss of strength.  MISCELLANEOUS: No joint swelling or pain       Vital Signs Last 24 Hrs  T(C): 36.7 (15 Mar 2019 10:00), Max: 37 (14 Mar 2019 21:23)  T(F): 98 (15 Mar 2019 10:00), Max: 98.6 (14 Mar 2019 21:23)  HR: 62 (15 Mar 2019 14:24) (62 - 68)  BP: 106/62 (15 Mar 2019 14:24) (106/62 - 121/62)  RR: 19 (15 Mar 2019 14:24) (12 - 19)  SpO2: 93% (15 Mar 2019 14:24) (68% - 100%)    PHYSICAL EXAM:    GENERAL: Middle age male looking comfortable   HEENT: Legally blind, has bipap mass on the face  NECK: soft, Supple, No JVD,   CHEST/LUNG: decrease air entry bilaterally; No wheezing  HEART: S1S2+, Regular rate and rhythm; No murmurs  ABDOMEN: Soft, Nontender, Nondistended; Bowel sounds present  EXTREMITIES:  1+ Peripheral Pulses, has edema  SKIN: No rashes or lesions  NEURO: AAOX3, no focal deficits, no motor r sensory loss  PSYCH: normal mood      LABS:                        8.1    5.9   )-----------( 125      ( 15 Mar 2019 09:25 )             24.1     03-15    143  |  97<L>  |  99.0<H>  ----------------------------<  155<H>  4.2   |  38.0<H>  |  1.46<H>    Ca    8.0<L>      15 Mar 2019 09:25              I&O's Summary    14 Mar 2019 07:01  -  15 Mar 2019 07:00  --------------------------------------------------------  IN: 0 mL / OUT: 2975 mL / NET: -2975 mL    15 Mar 2019 07:01  -  15 Mar 2019 14:57  --------------------------------------------------------  IN: 0 mL / OUT: 1000 mL / NET: -1000 mL        MEDICATIONS  (STANDING):  ALBUTerol    0.083% 10 milliGRAM(s) Nebulizer once  ALBUTerol/ipratropium for Nebulization 3 milliLiter(s) Nebulizer every 6 hours  amLODIPine   Tablet 5 milliGRAM(s) Oral daily  ascorbic acid 500 milliGRAM(s) Oral daily  aspirin  chewable 81 milliGRAM(s) Oral daily  atorvastatin 80 milliGRAM(s) Oral at bedtime  carvedilol 6.25 milliGRAM(s) Oral every 12 hours  dextrose 5%. 1000 milliLiter(s) (50 mL/Hr) IV Continuous <Continuous>  dextrose 50% Injectable 12.5 Gram(s) IV Push once  dextrose 50% Injectable 25 Gram(s) IV Push once  dextrose 50% Injectable 25 Gram(s) IV Push once  docusate sodium 100 milliGRAM(s) Oral two times a day  DULoxetine 60 milliGRAM(s) Oral daily  enoxaparin Injectable 40 milliGRAM(s) SubCutaneous daily  epoetin sara Injectable 62591 Unit(s) SubCutaneous <User Schedule>  furosemide   Injectable 60 milliGRAM(s) IV Push every 12 hours  gabapentin 100 milliGRAM(s) Oral three times a day  insulin glargine Injectable (LANTUS) 20 Unit(s) SubCutaneous at bedtime  insulin lispro (HumaLOG) corrective regimen sliding scale   SubCutaneous three times a day before meals  insulin lispro (HumaLOG) corrective regimen sliding scale   SubCutaneous at bedtime  insulin lispro Injectable (HumaLOG) 3 Unit(s) SubCutaneous three times a day before meals  isosorbide   mononitrate ER Tablet (IMDUR) 60 milliGRAM(s) Oral daily  magnesium oxide 400 milliGRAM(s) Oral daily  Nephro-riya 1 Tablet(s) Oral daily  pantoprazole    Tablet 40 milliGRAM(s) Oral before breakfast  polyethylene glycol 3350 17 Gram(s) Oral daily  senna 2 Tablet(s) Oral at bedtime    MEDICATIONS  (PRN):  acetaminophen   Tablet .. 650 milliGRAM(s) Oral every 6 hours PRN Mild Pain (1 - 3)  dextrose 40% Gel 15 Gram(s) Oral once PRN Blood Glucose LESS THAN 70 milliGRAM(s)/deciliter  glucagon  Injectable 1 milliGRAM(s) IntraMuscular once PRN Glucose LESS THAN 70 milligrams/deciliter  glycopyrrolate Injectable 0.1 milliGRAM(s) IV Push every 8 hours PRN secreations  guaiFENesin    Syrup 100 milliGRAM(s) Oral every 6 hours PRN Cough  ondansetron Injectable 4 milliGRAM(s) IV Push every 6 hours PRN Nausea and/or Vomiting

## 2019-03-15 NOTE — PROGRESS NOTE ADULT - SUBJECTIVE AND OBJECTIVE BOX
CC:  follow up GOC  INTERVAL HPI/OVERNIGHT EVENTS:    PRESENT SYMPTOMS: SOURCE:  Patient/Family/Team    PAIN SCALE:  0 = none  1 = mild   2 = moderate  3 = severe    Pain: denies    Dyspnea:  [x ] YES [ ] NO  on Bipap  Anxiety:  [ x] YES [ ] NO  Fatigue: [ x] YES [ ] NO  Nausea: [ ] YES [ x] NO  Loss of Appetite: [ ] YES [ ] NO  na NPO  Other symptoms: __________    MEDICATIONS  (STANDING):  ALBUTerol    0.083% 10 milliGRAM(s) Nebulizer once  ALBUTerol/ipratropium for Nebulization 3 milliLiter(s) Nebulizer every 6 hours  amLODIPine   Tablet 5 milliGRAM(s) Oral daily  ascorbic acid 500 milliGRAM(s) Oral daily  aspirin  chewable 81 milliGRAM(s) Oral daily  atorvastatin 80 milliGRAM(s) Oral at bedtime  carvedilol 6.25 milliGRAM(s) Oral every 12 hours  dextrose 5%. 1000 milliLiter(s) (50 mL/Hr) IV Continuous <Continuous>  dextrose 50% Injectable 12.5 Gram(s) IV Push once  dextrose 50% Injectable 25 Gram(s) IV Push once  dextrose 50% Injectable 25 Gram(s) IV Push once  docusate sodium 100 milliGRAM(s) Oral two times a day  DULoxetine 60 milliGRAM(s) Oral daily  enoxaparin Injectable 40 milliGRAM(s) SubCutaneous daily  epoetin sara Injectable 16612 Unit(s) SubCutaneous <User Schedule>  furosemide   Injectable 60 milliGRAM(s) IV Push every 12 hours  gabapentin 100 milliGRAM(s) Oral three times a day  insulin glargine Injectable (LANTUS) 20 Unit(s) SubCutaneous at bedtime  insulin lispro (HumaLOG) corrective regimen sliding scale   SubCutaneous three times a day before meals  insulin lispro (HumaLOG) corrective regimen sliding scale   SubCutaneous at bedtime  insulin lispro Injectable (HumaLOG) 3 Unit(s) SubCutaneous three times a day before meals  isosorbide   mononitrate ER Tablet (IMDUR) 60 milliGRAM(s) Oral daily  magnesium oxide 400 milliGRAM(s) Oral daily  Nephro-riya 1 Tablet(s) Oral daily  pantoprazole    Tablet 40 milliGRAM(s) Oral before breakfast  polyethylene glycol 3350 17 Gram(s) Oral daily  senna 2 Tablet(s) Oral at bedtime    MEDICATIONS  (PRN):  acetaminophen   Tablet .. 650 milliGRAM(s) Oral every 6 hours PRN Mild Pain (1 - 3)  dextrose 40% Gel 15 Gram(s) Oral once PRN Blood Glucose LESS THAN 70 milliGRAM(s)/deciliter  glucagon  Injectable 1 milliGRAM(s) IntraMuscular once PRN Glucose LESS THAN 70 milligrams/deciliter  glycopyrrolate Injectable 0.1 milliGRAM(s) IV Push every 8 hours PRN secreations  guaiFENesin    Syrup 100 milliGRAM(s) Oral every 6 hours PRN Cough  ondansetron Injectable 4 milliGRAM(s) IV Push every 6 hours PRN Nausea and/or Vomiting      Allergies    Accupril (Other)    Intolerances      Karnofsky Performance Score/Palliative Performance Status Version 2:   30  %    Vital Signs Last 24 Hrs  T(C): 36.7 (15 Mar 2019 10:00), Max: 37 (14 Mar 2019 21:23)  T(F): 98 (15 Mar 2019 10:00), Max: 98.6 (14 Mar 2019 21:23)  HR: 62 (15 Mar 2019 14:24) (62 - 68)  BP: 106/62 (15 Mar 2019 14:24) (106/62 - 121/62)  BP(mean): --  RR: 19 (15 Mar 2019 14:24) (12 - 19)  SpO2: 93% (15 Mar 2019 14:24) (68% - 100%)    PHYSICAL EXAM:    General: Obese woman, alert  NAD  HEENT: [ x] normal  [ ] dry mouth  [ ] ET tube/trach    Lungs: [ x] comfortable - on Bipap    CV: [x normal  [ ] tachycardia    GI: [ x] normal  [ ] distended  [ ] tender  [ ] no BS               [ ] PEG/NG/OG tube    : [ x] normal  [ ] incontinent  [ ] oliguria/anuria  [ ] fontenot    MSK: [ ] normal  [ x] weakness  [ ] edema             [ ] ambulatory  [ x] bedbound/wheelchair bound    Skin: [ ] normal  [ ] pressure ulcers- Stage_____  [x ] no rash    LABS:                        8.1    5.9   )-----------( 125      ( 15 Mar 2019 09:25 )             24.1     03-15    143  |  97<L>  |  99.0<H>  ----------------------------<  155<H>  4.2   |  38.0<H>  |  1.46<H>    Ca    8.0<L>      15 Mar 2019 09:25        Urinalysis Basic - ( 15 Mar 2019 14:46 )    Color: Yellow / Appearance: Cloudy / S.010 / pH: x  Gluc: x / Ketone: Negative  / Bili: Negative / Urobili: 1 mg/dL   Blood: x / Protein: 30 mg/dL / Nitrite: Negative   Leuk Esterase: Moderate / RBC: >50 /HPF / WBC 6-10   Sq Epi: x / Non Sq Epi: Occasional / Bacteria: Occasional      I&O's Summary    14 Mar 2019 07:  -  15 Mar 2019 07:00  --------------------------------------------------------  IN: 0 mL / OUT: 2975 mL / NET: -2975 mL    15 Mar 2019 07:  -  15 Mar 2019 15:55  --------------------------------------------------------  IN: 0 mL / OUT: 1000 mL / NET: -1000 mL        RADIOLOGY & ADDITIONAL STUDIES:  < from: CT Chest No Cont (19 @ 22:53) >  IMPRESSION:     Bilateral lung opacities including groundglass opacities in the left   upper and lower lobes and right middle lobe which have increased in   density since 3/8/2019 and a mixed density right upper lobe   consolidation, which has not significantly changed with the differential   including pulmonary edema, however underlying pneumonia cannot be   excluded in the right upper lobe.    Small to moderate right and small left pleural effusions, unchanged.    Anasarca and small to moderate upper abdominal ascites, unchanged.    Interval removal of right chest tube.      < end of copied text >      < from: Xray Chest 1 View- PORTABLE-Routine (03.15.19 @ 05:32) >   EXAM:  XR CHEST PORTABLE ROUTINE 1V                          PROCEDURE DATE:  03/15/2019          INTERPRETATION:  CHEST AP PORTABLE:    History: SOB.     Date and time of exam: 3/15/2019 4:46 AM.    Technique: A single AP view of the chest was obtained.    Comparison exam: 3/11/2019 11:35 AM.    Findings:  Cardiomegaly. Extensive bilateral consolidation demonstrating no   significant change since the prior examination..    Impression:  Extensive bilateral pulmonary consolidation with almost complete   opacification of both lungs. No significant change since 3/11/2019..    < end of copied text >      Thank you for the opportunity to assist with the care of this patient.   [facility] [phone].

## 2019-03-15 NOTE — PROGRESS NOTE ADULT - SUBJECTIVE AND OBJECTIVE BOX
NEPHROLOGY INTERVAL HPI/OVERNIGHT EVENTS:      No new events   Comfortable flat   Meds noted   Remains on IV Lasix       MEDICATIONS  (STANDING):  ALBUTerol    0.083% 10 milliGRAM(s) Nebulizer once  ALBUTerol/ipratropium for Nebulization 3 milliLiter(s) Nebulizer every 6 hours  amLODIPine   Tablet 5 milliGRAM(s) Oral daily  ascorbic acid 500 milliGRAM(s) Oral daily  aspirin  chewable 81 milliGRAM(s) Oral daily  atorvastatin 80 milliGRAM(s) Oral at bedtime  carvedilol 6.25 milliGRAM(s) Oral every 12 hours  dextrose 5%. 1000 milliLiter(s) (50 mL/Hr) IV Continuous <Continuous>  dextrose 50% Injectable 12.5 Gram(s) IV Push once  dextrose 50% Injectable 25 Gram(s) IV Push once  dextrose 50% Injectable 25 Gram(s) IV Push once  docusate sodium 100 milliGRAM(s) Oral two times a day  DULoxetine 60 milliGRAM(s) Oral daily  enoxaparin Injectable 40 milliGRAM(s) SubCutaneous daily  epoetin sara Injectable 22237 Unit(s) SubCutaneous <User Schedule>  furosemide   Injectable 60 milliGRAM(s) IV Push every 12 hours  gabapentin 100 milliGRAM(s) Oral three times a day  insulin glargine Injectable (LANTUS) 20 Unit(s) SubCutaneous at bedtime  insulin lispro (HumaLOG) corrective regimen sliding scale   SubCutaneous three times a day before meals  insulin lispro (HumaLOG) corrective regimen sliding scale   SubCutaneous at bedtime  insulin lispro Injectable (HumaLOG) 3 Unit(s) SubCutaneous three times a day before meals  isosorbide   mononitrate ER Tablet (IMDUR) 60 milliGRAM(s) Oral daily  magnesium oxide 400 milliGRAM(s) Oral daily  Nephro-riya 1 Tablet(s) Oral daily  pantoprazole    Tablet 40 milliGRAM(s) Oral before breakfast  polyethylene glycol 3350 17 Gram(s) Oral daily  senna 2 Tablet(s) Oral at bedtime    MEDICATIONS  (PRN):  acetaminophen   Tablet .. 650 milliGRAM(s) Oral every 6 hours PRN Mild Pain (1 - 3)  dextrose 40% Gel 15 Gram(s) Oral once PRN Blood Glucose LESS THAN 70 milliGRAM(s)/deciliter  glucagon  Injectable 1 milliGRAM(s) IntraMuscular once PRN Glucose LESS THAN 70 milligrams/deciliter  glycopyrrolate Injectable 0.1 milliGRAM(s) IV Push every 8 hours PRN secreations  guaiFENesin    Syrup 100 milliGRAM(s) Oral every 6 hours PRN Cough  ondansetron Injectable 4 milliGRAM(s) IV Push every 6 hours PRN Nausea and/or Vomiting      Allergies    Accupril (Other)    Intolerances        Vital Signs Last 24 Hrs  T(C): 36.7 (15 Mar 2019 10:00), Max: 37 (14 Mar 2019 21:23)  T(F): 98 (15 Mar 2019 10:00), Max: 98.6 (14 Mar 2019 21:23)  HR: 63 (15 Mar 2019 14:02) (62 - 68)  BP: 121/62 (15 Mar 2019 10:00) (107/61 - 121/62)  BP(mean): --  RR: 16 (15 Mar 2019 10:00) (12 - 18)  SpO2: 93% (15 Mar 2019 14:02) (68% - 100%)  Daily     Daily Weight in k.1 (15 Mar 2019 05:01)  I&O's Detail    14 Mar 2019 07:  -  15 Mar 2019 07:00  --------------------------------------------------------  IN:  Total IN: 0 mL    OUT:    Indwelling Catheter - Urethral: 2975 mL  Total OUT: 2975 mL    Total NET: -2975 mL      15 Mar 2019 07:  -  15 Mar 2019 14:08  --------------------------------------------------------  IN:  Total IN: 0 mL    OUT:    Voided: 1000 mL  Total OUT: 1000 mL    Total NET: -1000 mL        I&O's Summary    14 Mar 2019 07:  -  15 Mar 2019 07:00  --------------------------------------------------------  IN: 0 mL / OUT: 2975 mL / NET: -2975 mL    15 Mar 2019 07:01  -  15 Mar 2019 14:08  --------------------------------------------------------  IN: 0 mL / OUT: 1000 mL / NET: -1000 mL        PHYSICAL EXAM:  Appears chronically ill  Cardiovascular: Normal S1 S2, No rub  Respiratory: Clear lungs with decreased BS at bases	  Gastrointestinal:  Soft, Non-tender, + BS	  Extremities: + LE edema      LABS:                        8.1    5.9   )-----------( 125      ( 15 Mar 2019 09:25 )             24.1     03-15    143  |  97<L>  |  99.0<H>  ----------------------------<  155<H>  4.2   |  38.0<H>  |  1.46<H>    Ca    8.0<L>      15 Mar 2019 09:25                  RADIOLOGY & ADDITIONAL TESTS:

## 2019-03-15 NOTE — PROGRESS NOTE ADULT - ASSESSMENT
KEITH on CKD stage III in setting of decompensated CHF (R > L)  Cr dec cont to improve daily  Elevated BUN also due to steroids  1.1g proteinuria  cont Lasix  ==> will need to tolerate azotemia    Anemia: +CKD +GIB  s/p IV Fe==> Tsat=27%  - cont QUENTIN  - monitor H/H

## 2019-03-15 NOTE — CONSULT NOTE ADULT - CONSULT REQUESTED BY NAME
Dr Coronado
Dr. Acosta
Dr. Coronado
ER Physician
Eyal
Ivonne
Ruma
med team
Dr Hnedrix
Dr. Marcano
ivy

## 2019-03-15 NOTE — CONSULT NOTE ADULT - CONSULT REQUESTED DATE/TIME
01-Mar-2019 11:00
11-Feb-2019 05:00
11-Feb-2019 11:37
14-Feb-2019 16:00
14-Feb-2019 17:21
15-Mar-2019 15:01
27-Feb-2019 12:01
27-Feb-2019 12:42
04-Mar-2019 17:36
15-Mar-2019 20:00
20-Feb-2019 16:40

## 2019-03-16 DIAGNOSIS — I50.9 HEART FAILURE, UNSPECIFIED: ICD-10-CM

## 2019-03-16 DIAGNOSIS — J96.01 ACUTE RESPIRATORY FAILURE WITH HYPOXIA: ICD-10-CM

## 2019-03-16 DIAGNOSIS — J90 PLEURAL EFFUSION, NOT ELSEWHERE CLASSIFIED: ICD-10-CM

## 2019-03-16 LAB
ANION GAP SERPL CALC-SCNC: 12 MMOL/L — SIGNIFICANT CHANGE UP (ref 5–17)
BUN SERPL-MCNC: 86 MG/DL — HIGH (ref 8–20)
CALCIUM SERPL-MCNC: 8.7 MG/DL — SIGNIFICANT CHANGE UP (ref 8.6–10.2)
CHLORIDE SERPL-SCNC: 101 MMOL/L — SIGNIFICANT CHANGE UP (ref 98–107)
CO2 SERPL-SCNC: 36 MMOL/L — HIGH (ref 22–29)
CREAT SERPL-MCNC: 1.18 MG/DL — SIGNIFICANT CHANGE UP (ref 0.5–1.3)
CULTURE RESULTS: SIGNIFICANT CHANGE UP
GLUCOSE BLDC GLUCOMTR-MCNC: 120 MG/DL — HIGH (ref 70–99)
GLUCOSE BLDC GLUCOMTR-MCNC: 220 MG/DL — HIGH (ref 70–99)
GLUCOSE SERPL-MCNC: 93 MG/DL — SIGNIFICANT CHANGE UP (ref 70–115)
HCT VFR BLD CALC: 26.8 % — LOW (ref 37–47)
HGB BLD-MCNC: 8.8 G/DL — LOW (ref 12–16)
INR BLD: 1.29 RATIO — HIGH (ref 0.88–1.16)
MAGNESIUM SERPL-MCNC: 2.5 MG/DL — SIGNIFICANT CHANGE UP (ref 1.6–2.6)
MCHC RBC-ENTMCNC: 29.3 PG — SIGNIFICANT CHANGE UP (ref 27–31)
MCHC RBC-ENTMCNC: 32.8 G/DL — SIGNIFICANT CHANGE UP (ref 32–36)
MCV RBC AUTO: 89.3 FL — SIGNIFICANT CHANGE UP (ref 81–99)
PLATELET # BLD AUTO: 147 K/UL — LOW (ref 150–400)
POTASSIUM SERPL-MCNC: 4.3 MMOL/L — SIGNIFICANT CHANGE UP (ref 3.5–5.3)
POTASSIUM SERPL-SCNC: 4.3 MMOL/L — SIGNIFICANT CHANGE UP (ref 3.5–5.3)
PROCALCITONIN SERPL-MCNC: 0.17 NG/ML — HIGH (ref 0–0.04)
PROTHROM AB SERPL-ACNC: 14.9 SEC — HIGH (ref 10–12.9)
RBC # BLD: 3 M/UL — LOW (ref 4.4–5.2)
RBC # FLD: 17 % — HIGH (ref 11–15.6)
SODIUM SERPL-SCNC: 149 MMOL/L — HIGH (ref 135–145)
SPECIMEN SOURCE: SIGNIFICANT CHANGE UP
WBC # BLD: 4.9 K/UL — SIGNIFICANT CHANGE UP (ref 4.8–10.8)
WBC # FLD AUTO: 4.9 K/UL — SIGNIFICANT CHANGE UP (ref 4.8–10.8)

## 2019-03-16 PROCEDURE — 99291 CRITICAL CARE FIRST HOUR: CPT

## 2019-03-16 RX ORDER — INSULIN LISPRO 100/ML
VIAL (ML) SUBCUTANEOUS
Qty: 0 | Refills: 0 | Status: DISCONTINUED | OUTPATIENT
Start: 2019-03-16 | End: 2019-03-26

## 2019-03-16 RX ORDER — INSULIN LISPRO 100/ML
VIAL (ML) SUBCUTANEOUS EVERY 6 HOURS
Qty: 0 | Refills: 0 | Status: DISCONTINUED | OUTPATIENT
Start: 2019-03-16 | End: 2019-03-16

## 2019-03-16 RX ORDER — IPRATROPIUM/ALBUTEROL SULFATE 18-103MCG
3 AEROSOL WITH ADAPTER (GRAM) INHALATION EVERY 6 HOURS
Qty: 0 | Refills: 0 | Status: DISCONTINUED | OUTPATIENT
Start: 2019-03-16 | End: 2019-03-26

## 2019-03-16 RX ADMIN — MAGNESIUM OXIDE 400 MG ORAL TABLET 400 MILLIGRAM(S): 241.3 TABLET ORAL at 11:53

## 2019-03-16 RX ADMIN — Medication 81 MILLIGRAM(S): at 11:53

## 2019-03-16 RX ADMIN — Medication 40 MILLIGRAM(S): at 05:48

## 2019-03-16 RX ADMIN — DULOXETINE HYDROCHLORIDE 60 MILLIGRAM(S): 30 CAPSULE, DELAYED RELEASE ORAL at 11:53

## 2019-03-16 RX ADMIN — CARVEDILOL PHOSPHATE 6.25 MILLIGRAM(S): 80 CAPSULE, EXTENDED RELEASE ORAL at 18:15

## 2019-03-16 RX ADMIN — Medication 1 TABLET(S): at 11:54

## 2019-03-16 RX ADMIN — Medication 3 MILLILITER(S): at 03:15

## 2019-03-16 RX ADMIN — ISOSORBIDE MONONITRATE 60 MILLIGRAM(S): 60 TABLET, EXTENDED RELEASE ORAL at 11:55

## 2019-03-16 RX ADMIN — Medication 3 UNIT(S): at 16:48

## 2019-03-16 RX ADMIN — Medication 10 MILLIGRAM(S): at 11:48

## 2019-03-16 RX ADMIN — GABAPENTIN 100 MILLIGRAM(S): 400 CAPSULE ORAL at 14:40

## 2019-03-16 RX ADMIN — Medication 4: at 16:48

## 2019-03-16 RX ADMIN — Medication 40 MILLIGRAM(S): at 14:40

## 2019-03-16 RX ADMIN — Medication 60 MILLIGRAM(S): at 05:48

## 2019-03-16 RX ADMIN — Medication 500 MILLIGRAM(S): at 11:53

## 2019-03-16 RX ADMIN — Medication 60 MILLIGRAM(S): at 18:15

## 2019-03-16 RX ADMIN — Medication 3 MILLILITER(S): at 09:10

## 2019-03-16 NOTE — PROGRESS NOTE ADULT - SUBJECTIVE AND OBJECTIVE BOX
NEPHROLOGY INTERVAL HPI/OVERNIGHT EVENTS:    Transferred to ICU   IV Lasix   IV steroids   Feels better   UO 4.2 L with fontenot       MEDICATIONS  (STANDING):  ALBUTerol    0.083% 10 milliGRAM(s) Nebulizer once  amLODIPine   Tablet 5 milliGRAM(s) Oral daily  ascorbic acid 500 milliGRAM(s) Oral daily  aspirin  chewable 81 milliGRAM(s) Oral daily  atorvastatin 80 milliGRAM(s) Oral at bedtime  carvedilol 6.25 milliGRAM(s) Oral every 12 hours  dextrose 5%. 1000 milliLiter(s) (50 mL/Hr) IV Continuous <Continuous>  dextrose 50% Injectable 12.5 Gram(s) IV Push once  dextrose 50% Injectable 25 Gram(s) IV Push once  dextrose 50% Injectable 25 Gram(s) IV Push once  docusate sodium 100 milliGRAM(s) Oral two times a day  DULoxetine 60 milliGRAM(s) Oral daily  enoxaparin Injectable 40 milliGRAM(s) SubCutaneous daily  epoetin sara Injectable 26336 Unit(s) SubCutaneous <User Schedule>  furosemide   Injectable 60 milliGRAM(s) IV Push every 12 hours  gabapentin 100 milliGRAM(s) Oral three times a day  insulin glargine Injectable (LANTUS) 20 Unit(s) SubCutaneous at bedtime  insulin lispro (HumaLOG) corrective regimen sliding scale   SubCutaneous every 6 hours  insulin lispro Injectable (HumaLOG) 3 Unit(s) SubCutaneous three times a day before meals  isosorbide   mononitrate ER Tablet (IMDUR) 60 milliGRAM(s) Oral daily  magnesium oxide 400 milliGRAM(s) Oral daily  methylPREDNISolone sodium succinate Injectable 40 milliGRAM(s) IV Push every 8 hours  Nephro-riya 1 Tablet(s) Oral daily  pantoprazole    Tablet 40 milliGRAM(s) Oral before breakfast  polyethylene glycol 3350 17 Gram(s) Oral daily  senna 2 Tablet(s) Oral at bedtime    MEDICATIONS  (PRN):  acetaminophen   Tablet .. 650 milliGRAM(s) Oral every 6 hours PRN Mild Pain (1 - 3)  ALBUTerol/ipratropium for Nebulization 3 milliLiter(s) Nebulizer every 6 hours PRN Shortness of Breath and/or Wheezing  dextrose 40% Gel 15 Gram(s) Oral once PRN Blood Glucose LESS THAN 70 milliGRAM(s)/deciliter  glucagon  Injectable 1 milliGRAM(s) IntraMuscular once PRN Glucose LESS THAN 70 milligrams/deciliter  glycopyrrolate Injectable 0.1 milliGRAM(s) IV Push every 8 hours PRN secreations  guaiFENesin    Syrup 100 milliGRAM(s) Oral every 6 hours PRN Cough  hydrALAZINE Injectable 10 milliGRAM(s) IV Push every 4 hours PRN sbp> 180  ondansetron Injectable 4 milliGRAM(s) IV Push every 6 hours PRN Nausea and/or Vomiting      Allergies    Accupril (Other)    Intolerances      ALLERY AND IMMUNOLOGIC: No hives or eczema      Vital Signs Last 24 Hrs  T(C): 36.3 (16 Mar 2019 12:00), Max: 37.1 (15 Mar 2019 20:40)  T(F): 97.4 (16 Mar 2019 12:00), Max: 98.8 (16 Mar 2019 00:00)  HR: 70 (16 Mar 2019 14:00) (59 - 98)  BP: 168/74 (16 Mar 2019 14:00) (126/59 - 178/81)  BP(mean): 106 (16 Mar 2019 14:00) (92 - 116)  RR: 16 (16 Mar 2019 14:00) (9 - 32)  SpO2: 94% (16 Mar 2019 14:00) (88% - 100%)  Daily     Daily Weight in k.2 (16 Mar 2019 04:00)  I&O's Detail    15 Mar 2019 07:  -  16 Mar 2019 07:00  --------------------------------------------------------  IN:  Total IN: 0 mL    OUT:    Indwelling Catheter - Urethral: 4275 mL    Voided: 1000 mL  Total OUT: 5275 mL    Total NET: -5275 mL      16 Mar 2019 07:  -  16 Mar 2019 15:55  --------------------------------------------------------  IN:  Total IN: 0 mL    OUT:    Indwelling Catheter - Urethral: 1700 mL  Total OUT: 1700 mL    Total NET: -1700 mL        I&O's Summary    15 Mar 2019 07:  -  16 Mar 2019 07:00  --------------------------------------------------------  IN: 0 mL / OUT: 5275 mL / NET: -5275 mL    16 Mar 2019 07:  -  16 Mar 2019 15:55  --------------------------------------------------------  IN: 0 mL / OUT: 1700 mL / NET: -1700 mL        PHYSICAL EXAM:    OOB to chair   Off Bipap now   Cardiovascular: Normal S1 S2, No rub  Respiratory: Clear lungs with decreased BS at bases	  Gastrointestinal:  Soft, Non-tender, + BS	  Extremities: + LE edema      LABS:                        8.8    4.9   )-----------( 147      ( 16 Mar 2019 04:47 )             26.8     03-16    149<H>  |  101  |  86.0<H>  ----------------------------<  93  4.3   |  36.0<H>  |  1.18    Ca    8.7      16 Mar 2019 04:47  Mg     2.5     03-16      PT/INR - ( 16 Mar 2019 04:47 )   PT: 14.9 sec;   INR: 1.29 ratio           Urinalysis Basic - ( 15 Mar 2019 14:46 )    Color: Yellow / Appearance: Cloudy / S.010 / pH: x  Gluc: x / Ketone: Negative  / Bili: Negative / Urobili: 1 mg/dL   Blood: x / Protein: 30 mg/dL / Nitrite: Negative   Leuk Esterase: Moderate / RBC: >50 /HPF / WBC 6-10   Sq Epi: x / Non Sq Epi: Occasional / Bacteria: Occasional      Magnesium, Serum: 2.5 mg/dL ( @ 04:47)    ABG - ( 15 Mar 2019 16:58 )  pH, Arterial: 7.51  pH, Blood: x     /  pCO2: 57    /  pO2: 53    / HCO3: 44    / Base Excess: 19.9  /  SaO2: 90                    RADIOLOGY & ADDITIONAL TESTS:

## 2019-03-16 NOTE — PROGRESS NOTE ADULT - SUBJECTIVE AND OBJECTIVE BOX
PULMONARY PROGRESS NOTE      RANJIT ROCHA-[id number]    Patient is a 63y old  Female who presents with a chief complaint of SOB (16 Mar 2019 01:17)      INTERVAL HPI/OVERNIGHT EVENTS:Pt moved to MICU due to worsening hypoxemia, diffuse bre inf.  Now on BiPAP--unable to wean to HFNC.    MEDICATIONS  (STANDING):  ALBUTerol    0.083% 10 milliGRAM(s) Nebulizer once  ALBUTerol/ipratropium for Nebulization 3 milliLiter(s) Nebulizer every 6 hours  amLODIPine   Tablet 5 milliGRAM(s) Oral daily  ascorbic acid 500 milliGRAM(s) Oral daily  aspirin  chewable 81 milliGRAM(s) Oral daily  atorvastatin 80 milliGRAM(s) Oral at bedtime  carvedilol 6.25 milliGRAM(s) Oral every 12 hours  dextrose 5%. 1000 milliLiter(s) (50 mL/Hr) IV Continuous <Continuous>  dextrose 50% Injectable 12.5 Gram(s) IV Push once  dextrose 50% Injectable 25 Gram(s) IV Push once  dextrose 50% Injectable 25 Gram(s) IV Push once  docusate sodium 100 milliGRAM(s) Oral two times a day  DULoxetine 60 milliGRAM(s) Oral daily  enoxaparin Injectable 40 milliGRAM(s) SubCutaneous daily  epoetin sara Injectable 11661 Unit(s) SubCutaneous <User Schedule>  furosemide   Injectable 60 milliGRAM(s) IV Push every 12 hours  gabapentin 100 milliGRAM(s) Oral three times a day  insulin glargine Injectable (LANTUS) 20 Unit(s) SubCutaneous at bedtime  insulin lispro (HumaLOG) corrective regimen sliding scale   SubCutaneous at bedtime  insulin lispro Injectable (HumaLOG) 3 Unit(s) SubCutaneous three times a day before meals  isosorbide   mononitrate ER Tablet (IMDUR) 60 milliGRAM(s) Oral daily  magnesium oxide 400 milliGRAM(s) Oral daily  methylPREDNISolone sodium succinate Injectable 40 milliGRAM(s) IV Push every 8 hours  Nephro-riya 1 Tablet(s) Oral daily  pantoprazole    Tablet 40 milliGRAM(s) Oral before breakfast  polyethylene glycol 3350 17 Gram(s) Oral daily  senna 2 Tablet(s) Oral at bedtime      MEDICATIONS  (PRN):  acetaminophen   Tablet .. 650 milliGRAM(s) Oral every 6 hours PRN Mild Pain (1 - 3)  dextrose 40% Gel 15 Gram(s) Oral once PRN Blood Glucose LESS THAN 70 milliGRAM(s)/deciliter  glucagon  Injectable 1 milliGRAM(s) IntraMuscular once PRN Glucose LESS THAN 70 milligrams/deciliter  glycopyrrolate Injectable 0.1 milliGRAM(s) IV Push every 8 hours PRN secreations  guaiFENesin    Syrup 100 milliGRAM(s) Oral every 6 hours PRN Cough  hydrALAZINE Injectable 10 milliGRAM(s) IV Push every 4 hours PRN sbp> 180  ondansetron Injectable 4 milliGRAM(s) IV Push every 6 hours PRN Nausea and/or Vomiting      Allergies    Accupril (Other)    Intolerances        PAST MEDICAL & SURGICAL HISTORY:  Herniated disc, cervical  PAD (peripheral artery disease)  Legally blind  History of peripheral vascular disease  History of retinal detachment  History of CEA (carotid endarterectomy): Right  Hyperlipidemia  Glaucoma  Hypertension  Diabetes  S/P CABG x 1  After cataract  Uveitic glaucoma of both eyes  Detached retina  Atherosclerosis of right carotid artery   delivery delivered      SOCIAL HISTORY  Smoking History:       REVIEW OF SYSTEMS:unable  Vital Signs Last 24 Hrs  T(C): 36.5 (16 Mar 2019 08:00), Max: 37.1 (15 Mar 2019 20:40)  T(F): 97.7 (16 Mar 2019 08:00), Max: 98.8 (16 Mar 2019 00:00)  HR: 61 (16 Mar 2019 09:11) (59 - 98)  BP: 167/79 (16 Mar 2019 09:00) (106/62 - 178/81)  BP(mean): 113 (16 Mar 2019 09:00) (92 - 116)  RR: 9 (16 Mar 2019 09:00) (9 - 32)  SpO2: 100% (16 Mar 2019 09:11) (90% - 100%)    PHYSICAL EXAMINATION:    GENERAL: The patient is awake and alert in no apparent distress.     HEENT: Head is normocephalic and atraumatic. Extraocular muscles are intact. Mucous membranes are moist.    NECK: Supple.    LUNGS: bilat crackles.    HEART: Regular rate and rhythm without murmur.    ABDOMEN: Soft, nontender, and nondistended.      EXTREMITIES: Without any cyanosis, clubbing, rash, lesions or edema.    NEUROLOGIC: Grossly intact.    SKIN: No ulceration or induration present.      LABS:                        8.8    4.9   )-----------( 147      ( 16 Mar 2019 04:47 )             26.8     -    149<H>  |  101  |  86.0<H>  ----------------------------<  93  4.3   |  36.0<H>  |  1.18    Ca    8.7      16 Mar 2019 04:47  Mg     2.5           PT/INR - ( 16 Mar 2019 04:47 )   PT: 14.9 sec;   INR: 1.29 ratio       Procalcitonin, Serum (19 @ 04:47)    Procalcitonin, Serum: 0.17: Procalcitonin (PCT) Interpretation (ng/mL) - Diagnosis of systemic  bacterial infection/sepsis  PCT < 0.5: Systemic infection (sepsis) is not likely and risk for  progression to severe systemic infection is low. Local bacterial  infection is possible. If early sepsis is suspected clinically, PCT  should be re-assessed in 6-24 hours.  PCT >/= 0.5 but < 2.0: Systemic infection (sepsis) is possible, but other  conditions are known to elevate PCT as well. Moderate risk for  progression to severe systemic infection. The patient should be closely  monitored both clinically and by re-assessing PCT within 6-24 hours.  PCT >/= 2.0 but < 10.0: Systemic infection (sepsis) is likely, unless  other causes are known. High risk of progression to severe systemic  infection (severe sepsis/septic shock).  PCT >/= 10.0: Important systemic inflammatory response, almost  exclusively due to severe bacterial sepsis or septic shock. High  likelihood of severe sepsis or septic shock. ng/mL        Urinalysis Basic - ( 15 Mar 2019 14:46 )    Color: Yellow / Appearance: Cloudy / S.010 / pH: x  Gluc: x / Ketone: Negative  / Bili: Negative / Urobili: 1 mg/dL   Blood: x / Protein: 30 mg/dL / Nitrite: Negative   Leuk Esterase: Moderate / RBC: >50 /HPF / WBC 6-10   Sq Epi: x / Non Sq Epi: Occasional / Bacteria: Occasional    Blood Gas Profile - Arterial (03.15.19 @ 16:58)    pH, Arterial: 7.51    pCO2, Arterial: 57 mmHg    pO2, Arterial: 53 mmHg    HCO3, Arterial: 44 mmoL/L    Base Excess, Arterial: 19.9 mmol/L    Oxygen Saturation, Arterial: 90 %    FIO2, Arterial: 60    Blood Gas Comments Arterial: bipap 24/8/ 60%    Blood Gas Source Arterial: Arterial                       Serum Pro-Brain Natriuretic Peptide: 98735 pg/mL (19 @ 17:35)      Procalcitonin, Serum: 0.17 ng/mL (19 @ 04:47)  Procalcitonin, Serum: 0.21 ng/mL (19 @ 12:55)      MICROBIOLOGY:    RADIOLOGY & ADDITIONAL STUDIES:< from: Xray Chest 1 View- PORTABLE-Routine (03.15.19 @ 05:32) >    Impression:  Extensive bilateral pulmonary consolidation with almost complete   opacification of both lungs. No significant change since 3/11/2019..    < end of copied text >

## 2019-03-16 NOTE — PROGRESS NOTE ADULT - SUBJECTIVE AND OBJECTIVE BOX
Patient is a 63y old  Female who presents with a chief complaint of SOB (16 Mar 2019 10:16)      BRIEF HOSPITAL COURSE: 64 y/o F with a h/o CAD s/p CABG and PCI, PAD s/p balloon angioplasty, DM2, HTN, HLD, hx of cardiac arrest, recent GIB, Diastolic CHF with RV failure, mod-severe TR, and moderate pulmonary hypertension, legally blind, admitted on  with acute on chronic dCHF exacerbation and has had a prolonged complicated hospital course.  She was recently tapered off steroids and since then has been having increasing dyspnea, increasing fio2 requirements, worsening airspace opacities, now requiring bipap around the clock.  Hospitalist spoke with family who rescinded the DNR last night. Pt transferred to MICU.     Events last 24 hours: titrated off BIPAP to nasal oxygen.    PAST MEDICAL & SURGICAL HISTORY:  Herniated disc, cervical  PAD (peripheral artery disease)  Legally blind  History of peripheral vascular disease  History of retinal detachment  History of CEA (carotid endarterectomy): Right  Hyperlipidemia  Glaucoma  Hypertension  Diabetes  S/P CABG x 1  After cataract  Uveitic glaucoma of both eyes  Detached retina  Atherosclerosis of right carotid artery   delivery delivered        Medications:    amLODIPine   Tablet 5 milliGRAM(s) Oral daily  carvedilol 6.25 milliGRAM(s) Oral every 12 hours  furosemide   Injectable 60 milliGRAM(s) IV Push every 12 hours  hydrALAZINE Injectable 10 milliGRAM(s) IV Push every 4 hours PRN  isosorbide   mononitrate ER Tablet (IMDUR) 60 milliGRAM(s) Oral daily    ALBUTerol    0.083% 10 milliGRAM(s) Nebulizer once  ALBUTerol/ipratropium for Nebulization 3 milliLiter(s) Nebulizer every 6 hours PRN  guaiFENesin    Syrup 100 milliGRAM(s) Oral every 6 hours PRN    acetaminophen   Tablet .. 650 milliGRAM(s) Oral every 6 hours PRN  DULoxetine 60 milliGRAM(s) Oral daily  gabapentin 100 milliGRAM(s) Oral three times a day  ondansetron Injectable 4 milliGRAM(s) IV Push every 6 hours PRN      aspirin  chewable 81 milliGRAM(s) Oral daily  enoxaparin Injectable 40 milliGRAM(s) SubCutaneous daily    docusate sodium 100 milliGRAM(s) Oral two times a day  glycopyrrolate Injectable 0.1 milliGRAM(s) IV Push every 8 hours PRN  pantoprazole    Tablet 40 milliGRAM(s) Oral before breakfast  polyethylene glycol 3350 17 Gram(s) Oral daily  senna 2 Tablet(s) Oral at bedtime      atorvastatin 80 milliGRAM(s) Oral at bedtime  dextrose 40% Gel 15 Gram(s) Oral once PRN  dextrose 50% Injectable 12.5 Gram(s) IV Push once  dextrose 50% Injectable 25 Gram(s) IV Push once  dextrose 50% Injectable 25 Gram(s) IV Push once  glucagon  Injectable 1 milliGRAM(s) IntraMuscular once PRN  insulin glargine Injectable (LANTUS) 20 Unit(s) SubCutaneous at bedtime  insulin lispro (HumaLOG) corrective regimen sliding scale   SubCutaneous every 6 hours  insulin lispro Injectable (HumaLOG) 3 Unit(s) SubCutaneous three times a day before meals  methylPREDNISolone sodium succinate Injectable 40 milliGRAM(s) IV Push every 8 hours    ascorbic acid 500 milliGRAM(s) Oral daily  dextrose 5%. 1000 milliLiter(s) IV Continuous <Continuous>  magnesium oxide 400 milliGRAM(s) Oral daily  Nephro-riya 1 Tablet(s) Oral daily    epoetin sara Injectable 38805 Unit(s) SubCutaneous <User Schedule>              ICU Vital Signs Last 24 Hrs  T(C): 36.3 (16 Mar 2019 12:00), Max: 37.1 (15 Mar 2019 20:40)  T(F): 97.4 (16 Mar 2019 12:00), Max: 98.8 (16 Mar 2019 00:00)  HR: 63 (16 Mar 2019 12:00) (59 - 98)  BP: 156/70 (16 Mar 2019 12:00) (106/62 - 178/81)  BP(mean): 100 (16 Mar 2019 12:00) (92 - 116)  ABP: --  ABP(mean): --  RR: 21 (16 Mar 2019 12:00) (9 - 32)  SpO2: 94% (16 Mar 2019 12:00) (93% - 100%)      ABG - ( 15 Mar 2019 16:58 )  pH, Arterial: 7.51  pH, Blood: x     /  pCO2: 57    /  pO2: 53    / HCO3: 44    / Base Excess: 19.9  /  SaO2: 90                  I&O's Detail    15 Mar 2019 07:01  -  16 Mar 2019 07:00  --------------------------------------------------------  IN:  Total IN: 0 mL    OUT:    Indwelling Catheter - Urethral: 4275 mL    Voided: 1000 mL  Total OUT: 5275 mL    Total NET: -5275 mL      16 Mar 2019 07:01  -  16 Mar 2019 13:36  --------------------------------------------------------  IN:  Total IN: 0 mL    OUT:    Indwelling Catheter - Urethral: 1350 mL  Total OUT: 1350 mL    Total NET: -1350 mL            LABS:                        8.8    4.9   )-----------( 147      ( 16 Mar 2019 04:47 )             26.8     03-16    149<H>  |  101  |  86.0<H>  ----------------------------<  93  4.3   |  36.0<H>  |  1.18    Ca    8.7      16 Mar 2019 04:47  Mg     2.5     03-16            CAPILLARY BLOOD GLUCOSE      POCT Blood Glucose.: 120 mg/dL (16 Mar 2019 12:21)    PT/INR - ( 16 Mar 2019 04:47 )   PT: 14.9 sec;   INR: 1.29 ratio           Urinalysis Basic - ( 15 Mar 2019 14:46 )    Color: Yellow / Appearance: Cloudy / S.010 / pH: x  Gluc: x / Ketone: Negative  / Bili: Negative / Urobili: 1 mg/dL   Blood: x / Protein: 30 mg/dL / Nitrite: Negative   Leuk Esterase: Moderate / RBC: >50 /HPF / WBC 6-10   Sq Epi: x / Non Sq Epi: Occasional / Bacteria: Occasional      CULTURES:      Physical Examination:    General: No acute distress.      HEENT: Pupils equal, reactive to light.  Symmetric.    PULM: Coarse dminished to auscultation bilaterally, no significant sputum production    NECK: Supple, no lymphadenopathy, trachea midline    CVS: Regular rate and rhythm, systolic murmur    GI: Soft, nondistended, nontender, normoactive bowel sounds, no masses    EXT: No edema, nontender    SKIN: Warm and well perfused, no rashes noted.    NEURO: Alert, oriented, interactive, nonfocal- asking for food    DEVICES: fontenot    RADIOLOGY: < from: Xray Chest 1 View- PORTABLE-Routine (03.15.19 @ 05:32) >    Findings:  Cardiomegaly. Extensive bilateral consolidation demonstrating no   significant change since the prior examination..    Impression:  Extensive bilateral pulmonary consolidation with almost complete   opacification of both lungs. No significant change since 3/11/2019..                TIA JOHNSTON M.D., ATTENDING RADIOLOGIST  This document has been electronically signed. Mar 15 2019  8:38AM        < end of copied text >    CRITICAL CARE TIME SPENT: 35 minutes

## 2019-03-16 NOTE — CHART NOTE - NSCHARTNOTEFT_GEN_A_CORE
Source: Patient [x ]  Family [ ]   other [ ]    Current Diet: Diet, Consistent Carbohydrate Renal/No Snacks:   High Fiber  DASH/TLC {Sodium & Cholesteral Restricted}  Supplement Feeding Modality:  Oral  Nepro Cans or Servings Per Day:  1       Frequency:  Three Times a day (03-16-19 @ 13:38)      Patient reports [ ] nausea  [ ] vomiting [ ] diarrhea [ ] constipation  [ ]chewing problems [ ] swallowing issues  [x ] other:  "hungry, has not eaten in 4 days)     Source for PO intake [x ] Patient [ ] family [ ] chart [ ] staff [ ] other    Current Weight:   (3/16) 206#  (3/8) 228#  (3/5) 246#  (2/19) 245#  (2/16) 210#    % Weight Change:  aware wt fluctuations, +edema noted, will continue to monitor trends     Pertinent Medications: MEDICATIONS  (STANDING):  ALBUTerol    0.083% 10 milliGRAM(s) Nebulizer once  amLODIPine   Tablet 5 milliGRAM(s) Oral daily  ascorbic acid 500 milliGRAM(s) Oral daily  aspirin  chewable 81 milliGRAM(s) Oral daily  atorvastatin 80 milliGRAM(s) Oral at bedtime  carvedilol 6.25 milliGRAM(s) Oral every 12 hours  dextrose 5%. 1000 milliLiter(s) (50 mL/Hr) IV Continuous <Continuous>  dextrose 50% Injectable 12.5 Gram(s) IV Push once  dextrose 50% Injectable 25 Gram(s) IV Push once  dextrose 50% Injectable 25 Gram(s) IV Push once  docusate sodium 100 milliGRAM(s) Oral two times a day  DULoxetine 60 milliGRAM(s) Oral daily  enoxaparin Injectable 40 milliGRAM(s) SubCutaneous daily  epoetin sara Injectable 61337 Unit(s) SubCutaneous <User Schedule>  furosemide   Injectable 60 milliGRAM(s) IV Push every 12 hours  gabapentin 100 milliGRAM(s) Oral three times a day  insulin glargine Injectable (LANTUS) 20 Unit(s) SubCutaneous at bedtime  insulin lispro (HumaLOG) corrective regimen sliding scale   SubCutaneous every 6 hours  insulin lispro Injectable (HumaLOG) 3 Unit(s) SubCutaneous three times a day before meals  isosorbide   mononitrate ER Tablet (IMDUR) 60 milliGRAM(s) Oral daily  magnesium oxide 400 milliGRAM(s) Oral daily  methylPREDNISolone sodium succinate Injectable 40 milliGRAM(s) IV Push every 8 hours  Nephro-riya 1 Tablet(s) Oral daily  pantoprazole    Tablet 40 milliGRAM(s) Oral before breakfast  polyethylene glycol 3350 17 Gram(s) Oral daily  senna 2 Tablet(s) Oral at bedtime    MEDICATIONS  (PRN):  acetaminophen   Tablet .. 650 milliGRAM(s) Oral every 6 hours PRN Mild Pain (1 - 3)  ALBUTerol/ipratropium for Nebulization 3 milliLiter(s) Nebulizer every 6 hours PRN Shortness of Breath and/or Wheezing  dextrose 40% Gel 15 Gram(s) Oral once PRN Blood Glucose LESS THAN 70 milliGRAM(s)/deciliter  glucagon  Injectable 1 milliGRAM(s) IntraMuscular once PRN Glucose LESS THAN 70 milligrams/deciliter  glycopyrrolate Injectable 0.1 milliGRAM(s) IV Push every 8 hours PRN secreations  guaiFENesin    Syrup 100 milliGRAM(s) Oral every 6 hours PRN Cough  hydrALAZINE Injectable 10 milliGRAM(s) IV Push every 4 hours PRN sbp> 180  ondansetron Injectable 4 milliGRAM(s) IV Push every 6 hours PRN Nausea and/or Vomiting    Pertinent Labs: CBC Full  -  ( 16 Mar 2019 04:47 )  WBC Count : 4.9 K/uL  Hemoglobin : 8.8 g/dL  Hematocrit : 26.8 %  Platelet Count - Automated : 147 K/uL  Mean Cell Volume : 89.3 fl  Mean Cell Hemoglobin : 29.3 pg  Mean Cell Hemoglobin Concentration : 32.8 g/dL  Auto Neutrophil # : x  Auto Lymphocyte # : x  Auto Monocyte # : x  Auto Eosinophil # : x  Auto Basophil # : x  Auto Neutrophil % : x  Auto Lymphocyte % : x  Auto Monocyte % : x  Auto Eosinophil % : x  Auto Basophil % : x        Skin: R foot ulcer, Coccyx-healing stage II     Nutrition focused physical exam conducted - found signs of malnutrition [ ]absent [x ]present    Subcutaneous fat loss: [ ] Orbital fat pads region, [ ]Buccal fat region, [ ]Triceps region,  [ ]Ribs region    Muscle wasting: [x ]Temples region, [ ]Clavicle region, [ ]Shoulder region, [ ]Scapula region, [ ]Interosseous region,  [ ]thigh region, [ ]Calf region    Estimated Needs:   [ x] no change since previous assessment  [ ] recalculated:     Current Nutrition Diagnosis:  Pt remains at high nutrition risk secondary to moderate (acute) protein-calorie malnutrition related to inadequate protein-energy intake in the setting of worsening B/L lung infiltrates and reaccumulation of pleural effusion and CHF/pulmonary edema (on and off NPO status as evidenced by +fluid accumulation, Meeting less then 50% estimated nutrition needs >5 days and physical signs of  muscle mass loss in temple region. Pt has been NPO, reports being hungry and would like to eat, reports not eating in 4 days. Recommendations below:     Recommendations:   1. Rx: Nephro-Riya, folic acid and Vit. C daily   2. check wt daily to monitor trends.   3. Continue with Nepro TID   4. Assist with meals as needed.     Monitoring and Evaluation:   [x ] PO intake [x ] Tolerance to diet prescription [X] Weights  [X] Follow up per protocol [X] Labs:

## 2019-03-16 NOTE — PROGRESS NOTE ADULT - SUBJECTIVE AND OBJECTIVE BOX
Patient is a 63y old  Female who presents with a chief complaint of SOB (15 Mar 2019 21:07)      BRIEF HOSPITAL COURSE: 64 y/o F with a h/o CAD s/p CABG and PCI, PAD s/p balloon angioplasty, DM2, HTN, HLD, hx of cardiac arrest, recent GIB, Diastolic CHF with RV failure, mod-severe TR, and moderate pulmonary hypertension, legally blind, admitted on  with acute on chronic dCHF exacerbation and has had a prolonged complicated hospital course.     Events last 24 hours: Patient experienced recurrent respiratory failure and is again NIPPV-dependent with high FiO2 requirement. Worsening interstitial lung infiltrates with almost complete opacification of both lung fields on CXR. DNR/DNI directives have been rescinded by patient and her family. Attempted to wean patient to HFNC in order to provide oral care as she had been BiPAP-dependent for quite some time now, but she did not tolerate.      PAST MEDICAL & SURGICAL HISTORY:  Herniated disc, cervical  PAD (peripheral artery disease)  Legally blind  History of peripheral vascular disease  History of retinal detachment  History of CEA (carotid endarterectomy): Right  Hyperlipidemia  Glaucoma  Hypertension  Diabetes  S/P CABG x 1  After cataract  Uveitic glaucoma of both eyes  Detached retina  Atherosclerosis of right carotid artery   delivery delivered    Allergies    Accupril (Other)    Intolerances      FAMILY HISTORY:      Review of Systems:  CONSTITUTIONAL: No fever, chills, or fatigue  EYES: No eye pain, visual disturbances, or discharge  ENMT:  No difficulty hearing, tinnitus, vertigo; No sinus or throat pain  NECK: No pain or stiffness  RESPIRATORY: No cough, wheezing, chills or hemoptysis;(+) shortness of breath  CARDIOVASCULAR: No chest pain, palpitations, dizziness, (+) leg swelling  GASTROINTESTINAL: No abdominal or epigastric pain. No nausea, vomiting, or hematemesis; No diarrhea or constipation. No melena or hematochezia.  GENITOURINARY: No dysuria, frequency, hematuria, or incontinence  NEUROLOGICAL: No headaches, memory loss, loss of strength, numbness, or tremors  SKIN: No itching, burning, rashes, or lesions   MUSCULOSKELETAL: No joint pain or swelling; No muscle, back, or extremity pain  PSYCHIATRIC: No depression, anxiety, mood swings, or difficulty sleeping        Medications:    amLODIPine   Tablet 5 milliGRAM(s) Oral daily  carvedilol 6.25 milliGRAM(s) Oral every 12 hours  furosemide   Injectable 60 milliGRAM(s) IV Push every 12 hours  hydrALAZINE Injectable 10 milliGRAM(s) IV Push every 4 hours PRN  isosorbide   mononitrate ER Tablet (IMDUR) 60 milliGRAM(s) Oral daily  ALBUTerol    0.083% 10 milliGRAM(s) Nebulizer once  ALBUTerol/ipratropium for Nebulization 3 milliLiter(s) Nebulizer every 6 hours  guaiFENesin    Syrup 100 milliGRAM(s) Oral every 6 hours PRN  acetaminophen   Tablet .. 650 milliGRAM(s) Oral every 6 hours PRN  DULoxetine 60 milliGRAM(s) Oral daily  gabapentin 100 milliGRAM(s) Oral three times a day  ondansetron Injectable 4 milliGRAM(s) IV Push every 6 hours PRN  aspirin  chewable 81 milliGRAM(s) Oral daily  enoxaparin Injectable 40 milliGRAM(s) SubCutaneous daily  docusate sodium 100 milliGRAM(s) Oral two times a day  glycopyrrolate Injectable 0.1 milliGRAM(s) IV Push every 8 hours PRN  pantoprazole    Tablet 40 milliGRAM(s) Oral before breakfast  polyethylene glycol 3350 17 Gram(s) Oral daily  senna 2 Tablet(s) Oral at bedtime  atorvastatin 80 milliGRAM(s) Oral at bedtime  dextrose 40% Gel 15 Gram(s) Oral once PRN  dextrose 50% Injectable 12.5 Gram(s) IV Push once  dextrose 50% Injectable 25 Gram(s) IV Push once  dextrose 50% Injectable 25 Gram(s) IV Push once  glucagon  Injectable 1 milliGRAM(s) IntraMuscular once PRN  insulin glargine Injectable (LANTUS) 20 Unit(s) SubCutaneous at bedtime  insulin lispro (HumaLOG) corrective regimen sliding scale   SubCutaneous at bedtime  insulin lispro Injectable (HumaLOG) 3 Unit(s) SubCutaneous three times a day before meals  methylPREDNISolone sodium succinate Injectable 40 milliGRAM(s) IV Push every 8 hours  ascorbic acid 500 milliGRAM(s) Oral daily  dextrose 5%. 1000 milliLiter(s) IV Continuous <Continuous>  magnesium oxide 400 milliGRAM(s) Oral daily  Nephro-riya 1 Tablet(s) Oral daily  epoetin sara Injectable 99118 Unit(s) SubCutaneous <User Schedule>          ICU Vital Signs Last 24 Hrs  T(C): 37.1 (16 Mar 2019 00:00), Max: 37.1 (15 Mar 2019 20:40)  T(F): 98.8 (16 Mar 2019 00:00), Max: 98.8 (16 Mar 2019 00:00)  HR: 61 (16 Mar 2019 01:00) (60 - 68)  BP: 152/70 (16 Mar 2019 01:00) (106/62 - 178/81)  BP(mean): 100 (16 Mar 2019 01:) (99 - 116)  ABP: --  ABP(mean): --  RR: 16 (16 Mar 2019 01:00) (12 - 32)  SpO2: 96% (16 Mar 2019 01:) (68% - 100%)    Vital Signs Last 24 Hrs  T(C): 37.1 (16 Mar 2019 00:00), Max: 37.1 (15 Mar 2019 20:40)  T(F): 98.8 (16 Mar 2019 00:00), Max: 98.8 (16 Mar 2019 00:00)  HR: 61 (16 Mar 2019 01:00) (60 - 68)  BP: 152/70 (16 Mar 2019 01:00) (106/62 - 178/81)  BP(mean): 100 (16 Mar 2019 01:00) (99 - 116)  RR: 16 (16 Mar 2019 01:00) (12 - 32)  SpO2: 96% (16 Mar 2019 01:00) (68% - 100%)    ABG - ( 15 Mar 2019 16:58 )  pH, Arterial: 7.51  pH, Blood: x     /  pCO2: 57    /  pO2: 53    / HCO3: 44    / Base Excess: 19.9  /  SaO2: 90                  I&O's Detail    14 Mar 2019 07:01  -  15 Mar 2019 07:00  --------------------------------------------------------  IN:  Total IN: 0 mL    OUT:    Indwelling Catheter - Urethral: 2975 mL  Total OUT: 2975 mL    Total NET: -2975 mL      15 Mar 2019 07:01  -  16 Mar 2019 01:18  --------------------------------------------------------  IN:  Total IN: 0 mL    OUT:    Indwelling Catheter - Urethral: 2975 mL    Voided: 1000 mL  Total OUT: 3975 mL    Total NET: -3975 mL            LABS:                        8.1    5.9   )-----------( 125      ( 15 Mar 2019 09:25 )             24.1     03-15    143  |  97<L>  |  99.0<H>  ----------------------------<  155<H>  4.2   |  38.0<H>  |  1.46<H>    Ca    8.0<L>      15 Mar 2019 09:25            CAPILLARY BLOOD GLUCOSE      POCT Blood Glucose.: 81 mg/dL (15 Mar 2019 22:26)      Urinalysis Basic - ( 15 Mar 2019 14:46 )    Color: Yellow / Appearance: Cloudy / S.010 / pH: x  Gluc: x / Ketone: Negative  / Bili: Negative / Urobili: 1 mg/dL   Blood: x / Protein: 30 mg/dL / Nitrite: Negative   Leuk Esterase: Moderate / RBC: >50 /HPF / WBC 6-10   Sq Epi: x / Non Sq Epi: Occasional / Bacteria: Occasional      CULTURES:        Physical Examination:    General: mild resp distress.  Alert, oriented, interactive, nonfocal, on NIPPV    HEENT: right eye opacified, blind    PULM: scattered rhonchi/rales + diminished bilaterally, no significant sputum production    CVS: Regular rate and rhythm, no murmurs, rubs, or gallops    ABD: Soft, nondistended, nontender, normoactive bowel sounds, no masses    EXT: b/l LE edema, nontender    SKIN: Warm and well perfused, no rashes noted.    NEURO: A&Ox3, strength 5/5 all extremities, cranial nerves grossly intact, no focal deficits      RADIOLOGY:     < from: Xray Chest 1 View- PORTABLE-Routine (03.15.19 @ 05:32) >  Findings:  Cardiomegaly. Extensive bilateral consolidation demonstrating no   significant change since the prior examination..    Impression:  Extensive bilateral pulmonary consolidation with almost complete   opacification of both lungs. No significant change since 3/11/2019..        < from: CT Chest No Cont (19 @ 22:53) >  FINDINGS:    CHEST:     LUNGS AND LARGE AIRWAYS: Patent central airways.  Groundglass opacities   throughout the the left upper and lower lobes and right middle lobe with   a more dense consolidation with peripheral groundglass attenuation in the   right upper lobe with no significant change in the right upper lobe   consolidation since 3/8/2019 and the additional consolidations slightly   increased in density. There is compressive atelectasis in the right lower   lobe secondary to an effusion and minimal compressive atelectasis in the   left lower lobe.  PLEURA: Moderate right and small left pleural effusions, unchanged. There   has been interval removal of the right chest tube. A previously seen   right pneumothorax has resolved.  VESSELS: Thoracic aorta normal in caliber with mild calcified plaque.   There are coronary artery calcifications.  HEART: Enlarged. No pericardial effusion.  MEDIASTINUM AND YAMILEX: No lymphadenopathy.  CHEST WALL AND LOWER NECK: There is anasarca. No enlarged axillary lymph   nodes.  VISUALIZED UPPER ABDOMEN: Small to moderate amount of upper abdominal   ascites, not significantly changed since 3/8/2019. 4 mm nonobstructing   calculus in the right kidney.  BONES: Status post sternotomy. Degenerative changes in the spine    IMPRESSION:     Bilateral lung opacities including groundglass opacities in the left   upper and lower lobes and right middle lobe which have increased in   density since 3/8/2019 and a mixed density right upper lobe   consolidation, which has not significantly changed with the differential   including pulmonary edema, however underlying pneumonia cannot be   excluded in the right upper lobe.    Small to moderate right and small left pleural effusions, unchanged.    Anasarca and small to moderate upper abdominal ascites, unchanged.    Interval removal of right chest tube.            CRITICAL CARE TIME SPENT: 37 mins  Time spent evaluating/treating patient with medical issues that pose a high risk for life threatening deterioration and/or end-organ damage, reviewing data/labs/imaging, discussing case with multidisciplinary team, discussing plan/goals of care with patient/family. Non-inclusive of procedure time.

## 2019-03-16 NOTE — PROGRESS NOTE ADULT - NSICDXPROBLEM_GEN_ALL_CORE_FT
PROBLEM DIAGNOSES  Problem: Acute hypoxemic respiratory failure  Assessment and Plan: Secondary to worsening b/l lung infiltrates and reaccumulation of pleural effusion with pulmonary edema confounding the situation. Pt appeared to decompensate when steroids completed- steroids resumed with the hope this is a steroid responsive condition like .  Continue BiPAP as needed, at present she has been titrated off to nasal oxygen, would benefit from nocturnal BIPAP suport. Overall prognosis is extremely poor.    Problem: Acute decompensated heart failure  Assessment and Plan: Continue routine IV diuresis. Look to maintain net neg fluid balance. At present, don't believe inotrope supported diuresis is needed.     Problem: Pleural effusion  Assessment and Plan: beside ultrasond eval by PA with small pocket not big enough to drain PROBLEM DIAGNOSES  Problem: Acute hypoxemic respiratory failure  Assessment and Plan: Secondary to worsening b/l lung infiltrates and reaccumulation of pleural effusion with pulmonary edema confounding the situation. Pt appeared to decompensate when steroids completed- steroids resumed with the hope this is a steroid responsive condition like .  Continue BiPAP as needed, at present she has been titrated off to nasal oxygen, would benefit from nocturnal BIPAP support. Overall prognosis is extremely poor.    Problem: Acute decompensated heart failure  Assessment and Plan: Continue routine IV diuresis. Look to maintain net neg fluid balance. At present, don't believe inotrope supported diuresis is needed.     Problem: Pleural effusion  Assessment and Plan: beside ultrasond eval by PA with small pocket not big enough to drain

## 2019-03-16 NOTE — PROGRESS NOTE ADULT - ASSESSMENT
64 y/o F with a h/o CAD s/p CABG and PCI, PAD s/p balloon angioplasty, DM2, HTN, HLD, hx of cardiac arrest, recent GIB, Diastolic CHF with RV failure, mod-severe TR, and moderate pulmonary hypertension, legally blind, with acute hypoxemic respiratory failure, acute decompensated heart failure, pleural effusion

## 2019-03-16 NOTE — PROGRESS NOTE ADULT - ASSESSMENT
Imp--Diffuse bilat inf with resp failure.  Infection unlikely.  ?fluid overload.  Plan  biPAP support, suggest diuresis  DNR rescinded  diurese.

## 2019-03-16 NOTE — CHART NOTE - NSCHARTNOTEFT_GEN_A_CORE
patient maintained during the shift  on continuous bipap with no significant changes. pt desaturates when removed from bipap

## 2019-03-16 NOTE — PROGRESS NOTE ADULT - ASSESSMENT
Improved KEITH on CKD stage III in setting of decompensated CHF (R > L)  Elevated BUN also due to steroids  1.1g proteinuria  cont Lasix  ==> will need to tolerate azotemia    Anemia: +CKD +GIB  s/p IV Fe==> Tsat=27%  - cont QUENTIN

## 2019-03-16 NOTE — PROGRESS NOTE ADULT - NSICDXPROBLEM_GEN_ALL_CORE_FT
PROBLEM DIAGNOSES  Problem: Acute hypoxemic respiratory failure  Assessment and Plan: Secondary to worsening b/l lung infiltrates and reaccumulation of pleural effusion. There is certainly an acute CHF/pulmonary edema component given her fragile cardiac status, but there is now some concern for the possibility of a cryptogenic organizing pneumonia. It seems that her symptoms began to worsen as her steroids were tapered off. IV steroid regimen restarted. Continue BiPAP therapy, actively titrating to maintain SaO2 > 92%. Currently with high FiO2 requirement. Failed attempted transition to HFNC. Given her prolonged NIPPV-dependence, she is at high risk for intubation. Will have low threshold to place ETT as this could allow for bronchoscopy and further investigation into infectious etiology. Bedside POCUS showed relatively small fluid pocket in right pleural space. Holding off on thoracentesis at this time. Unfortunately, her prognosis is very poor.     Problem: Acute decompensated heart failure  Assessment and Plan: Continue routine IV diuresis. Look to maintain net neg fluid balance. Will consider milrinone for pulmonary vasculature vasodilation and assistance with R --> L flow if she continues to deteriorate. HR and BP control as necessary. Respiratory support as above.    Problem: Pleural effusion  Assessment and Plan: Manage as above. May eventually require drainage, but did not appear significant enough for stat thoracentesis.

## 2019-03-16 NOTE — PROGRESS NOTE ADULT - ASSESSMENT
62 y/o F with a h/o CAD s/p CABG and PCI, PAD s/p balloon angioplasty, DM2, HTN, HLD, hx of cardiac arrest, recent GIB, Diastolic CHF with RV failure, mod-severe TR, and moderate pulmonary hypertension, legally blind, with acute hypoxemic respiratory failure, acute decompensated heart failure, pleural effusion, possible cryptogenic organizing pneumonia.

## 2019-03-17 LAB
ANION GAP SERPL CALC-SCNC: 12 MMOL/L — SIGNIFICANT CHANGE UP (ref 5–17)
BUN SERPL-MCNC: 87 MG/DL — HIGH (ref 8–20)
CALCIUM SERPL-MCNC: 8.6 MG/DL — SIGNIFICANT CHANGE UP (ref 8.6–10.2)
CHLORIDE SERPL-SCNC: 102 MMOL/L — SIGNIFICANT CHANGE UP (ref 98–107)
CO2 SERPL-SCNC: 35 MMOL/L — HIGH (ref 22–29)
CREAT SERPL-MCNC: 1.21 MG/DL — SIGNIFICANT CHANGE UP (ref 0.5–1.3)
GLUCOSE BLDC GLUCOMTR-MCNC: 207 MG/DL — HIGH (ref 70–99)
GLUCOSE BLDC GLUCOMTR-MCNC: 216 MG/DL — HIGH (ref 70–99)
GLUCOSE BLDC GLUCOMTR-MCNC: 225 MG/DL — HIGH (ref 70–99)
GLUCOSE BLDC GLUCOMTR-MCNC: 258 MG/DL — HIGH (ref 70–99)
GLUCOSE BLDC GLUCOMTR-MCNC: 336 MG/DL — HIGH (ref 70–99)
GLUCOSE SERPL-MCNC: 225 MG/DL — HIGH (ref 70–115)
HCT VFR BLD CALC: 24.7 % — LOW (ref 37–47)
HGB BLD-MCNC: 8.2 G/DL — LOW (ref 12–16)
MAGNESIUM SERPL-MCNC: 2.6 MG/DL — SIGNIFICANT CHANGE UP (ref 1.6–2.6)
MCHC RBC-ENTMCNC: 29.7 PG — SIGNIFICANT CHANGE UP (ref 27–31)
MCHC RBC-ENTMCNC: 33.2 G/DL — SIGNIFICANT CHANGE UP (ref 32–36)
MCV RBC AUTO: 89.5 FL — SIGNIFICANT CHANGE UP (ref 81–99)
PHOSPHATE SERPL-MCNC: 3.2 MG/DL — SIGNIFICANT CHANGE UP (ref 2.4–4.7)
PLATELET # BLD AUTO: 144 K/UL — LOW (ref 150–400)
POTASSIUM SERPL-MCNC: 4.4 MMOL/L — SIGNIFICANT CHANGE UP (ref 3.5–5.3)
POTASSIUM SERPL-SCNC: 4.4 MMOL/L — SIGNIFICANT CHANGE UP (ref 3.5–5.3)
RBC # BLD: 2.76 M/UL — LOW (ref 4.4–5.2)
RBC # FLD: 17.3 % — HIGH (ref 11–15.6)
SODIUM SERPL-SCNC: 149 MMOL/L — HIGH (ref 135–145)
WBC # BLD: 5.2 K/UL — SIGNIFICANT CHANGE UP (ref 4.8–10.8)
WBC # FLD AUTO: 5.2 K/UL — SIGNIFICANT CHANGE UP (ref 4.8–10.8)

## 2019-03-17 PROCEDURE — 99233 SBSQ HOSP IP/OBS HIGH 50: CPT

## 2019-03-17 PROCEDURE — 99232 SBSQ HOSP IP/OBS MODERATE 35: CPT

## 2019-03-17 RX ORDER — INSULIN GLARGINE 100 [IU]/ML
10 INJECTION, SOLUTION SUBCUTANEOUS ONCE
Qty: 0 | Refills: 0 | Status: DISCONTINUED | OUTPATIENT
Start: 2019-03-17 | End: 2019-03-17

## 2019-03-17 RX ORDER — INSULIN GLARGINE 100 [IU]/ML
10 INJECTION, SOLUTION SUBCUTANEOUS ONCE
Qty: 0 | Refills: 0 | Status: COMPLETED | OUTPATIENT
Start: 2019-03-17 | End: 2019-03-17

## 2019-03-17 RX ORDER — INSULIN GLARGINE 100 [IU]/ML
30 INJECTION, SOLUTION SUBCUTANEOUS AT BEDTIME
Qty: 0 | Refills: 0 | Status: DISCONTINUED | OUTPATIENT
Start: 2019-03-17 | End: 2019-03-25

## 2019-03-17 RX ORDER — INSULIN LISPRO 100/ML
5 VIAL (ML) SUBCUTANEOUS
Qty: 0 | Refills: 0 | Status: DISCONTINUED | OUTPATIENT
Start: 2019-03-17 | End: 2019-03-26

## 2019-03-17 RX ADMIN — Medication 4: at 17:41

## 2019-03-17 RX ADMIN — Medication 1 TABLET(S): at 11:34

## 2019-03-17 RX ADMIN — ISOSORBIDE MONONITRATE 60 MILLIGRAM(S): 60 TABLET, EXTENDED RELEASE ORAL at 11:35

## 2019-03-17 RX ADMIN — Medication 60 MILLIGRAM(S): at 17:40

## 2019-03-17 RX ADMIN — ATORVASTATIN CALCIUM 80 MILLIGRAM(S): 80 TABLET, FILM COATED ORAL at 00:16

## 2019-03-17 RX ADMIN — Medication 4: at 21:28

## 2019-03-17 RX ADMIN — ATORVASTATIN CALCIUM 80 MILLIGRAM(S): 80 TABLET, FILM COATED ORAL at 21:28

## 2019-03-17 RX ADMIN — CARVEDILOL PHOSPHATE 6.25 MILLIGRAM(S): 80 CAPSULE, EXTENDED RELEASE ORAL at 17:40

## 2019-03-17 RX ADMIN — Medication 3 UNIT(S): at 11:39

## 2019-03-17 RX ADMIN — INSULIN GLARGINE 10 UNIT(S): 100 INJECTION, SOLUTION SUBCUTANEOUS at 14:01

## 2019-03-17 RX ADMIN — PANTOPRAZOLE SODIUM 40 MILLIGRAM(S): 20 TABLET, DELAYED RELEASE ORAL at 08:36

## 2019-03-17 RX ADMIN — Medication 40 MILLIGRAM(S): at 21:28

## 2019-03-17 RX ADMIN — ENOXAPARIN SODIUM 40 MILLIGRAM(S): 100 INJECTION SUBCUTANEOUS at 00:17

## 2019-03-17 RX ADMIN — SENNA PLUS 2 TABLET(S): 8.6 TABLET ORAL at 00:20

## 2019-03-17 RX ADMIN — GABAPENTIN 100 MILLIGRAM(S): 400 CAPSULE ORAL at 14:02

## 2019-03-17 RX ADMIN — ENOXAPARIN SODIUM 40 MILLIGRAM(S): 100 INJECTION SUBCUTANEOUS at 21:36

## 2019-03-17 RX ADMIN — INSULIN GLARGINE 30 UNIT(S): 100 INJECTION, SOLUTION SUBCUTANEOUS at 22:26

## 2019-03-17 RX ADMIN — Medication 500 MILLIGRAM(S): at 11:38

## 2019-03-17 RX ADMIN — Medication 40 MILLIGRAM(S): at 14:00

## 2019-03-17 RX ADMIN — Medication 40 MILLIGRAM(S): at 05:56

## 2019-03-17 RX ADMIN — Medication 40 MILLIGRAM(S): at 00:19

## 2019-03-17 RX ADMIN — DULOXETINE HYDROCHLORIDE 60 MILLIGRAM(S): 30 CAPSULE, DELAYED RELEASE ORAL at 11:34

## 2019-03-17 RX ADMIN — POLYETHYLENE GLYCOL 3350 17 GRAM(S): 17 POWDER, FOR SOLUTION ORAL at 00:20

## 2019-03-17 RX ADMIN — Medication 100 MILLIGRAM(S): at 17:39

## 2019-03-17 RX ADMIN — Medication 8: at 11:39

## 2019-03-17 RX ADMIN — INSULIN GLARGINE 20 UNIT(S): 100 INJECTION, SOLUTION SUBCUTANEOUS at 00:17

## 2019-03-17 RX ADMIN — CARVEDILOL PHOSPHATE 6.25 MILLIGRAM(S): 80 CAPSULE, EXTENDED RELEASE ORAL at 05:55

## 2019-03-17 RX ADMIN — GABAPENTIN 100 MILLIGRAM(S): 400 CAPSULE ORAL at 00:17

## 2019-03-17 RX ADMIN — Medication 4: at 07:00

## 2019-03-17 RX ADMIN — MAGNESIUM OXIDE 400 MG ORAL TABLET 400 MILLIGRAM(S): 241.3 TABLET ORAL at 11:34

## 2019-03-17 RX ADMIN — AMLODIPINE BESYLATE 5 MILLIGRAM(S): 2.5 TABLET ORAL at 05:55

## 2019-03-17 RX ADMIN — GABAPENTIN 100 MILLIGRAM(S): 400 CAPSULE ORAL at 06:11

## 2019-03-17 RX ADMIN — Medication 5 UNIT(S): at 17:40

## 2019-03-17 RX ADMIN — Medication 60 MILLIGRAM(S): at 06:07

## 2019-03-17 RX ADMIN — Medication 6: at 00:18

## 2019-03-17 RX ADMIN — Medication 3 UNIT(S): at 07:00

## 2019-03-17 RX ADMIN — Medication 100 MILLIGRAM(S): at 05:56

## 2019-03-17 RX ADMIN — Medication 81 MILLIGRAM(S): at 11:34

## 2019-03-17 RX ADMIN — GABAPENTIN 100 MILLIGRAM(S): 400 CAPSULE ORAL at 21:28

## 2019-03-17 NOTE — PROGRESS NOTE ADULT - SUBJECTIVE AND OBJECTIVE BOX
PULMONARY PROGRESS NOTE      RANJIT [id number]    Patient is a 63y old  Female who presents with a chief complaint of SOB (17 Mar 2019 04:09)      INTERVAL HPI/OVERNIGHT EVENTS:Pt more alert this AM, weaned to NCO2    MEDICATIONS  (STANDING):  ALBUTerol    0.083% 10 milliGRAM(s) Nebulizer once  amLODIPine   Tablet 5 milliGRAM(s) Oral daily  ascorbic acid 500 milliGRAM(s) Oral daily  aspirin  chewable 81 milliGRAM(s) Oral daily  atorvastatin 80 milliGRAM(s) Oral at bedtime  carvedilol 6.25 milliGRAM(s) Oral every 12 hours  dextrose 5%. 1000 milliLiter(s) (50 mL/Hr) IV Continuous <Continuous>  dextrose 50% Injectable 12.5 Gram(s) IV Push once  dextrose 50% Injectable 25 Gram(s) IV Push once  dextrose 50% Injectable 25 Gram(s) IV Push once  docusate sodium 100 milliGRAM(s) Oral two times a day  DULoxetine 60 milliGRAM(s) Oral daily  enoxaparin Injectable 40 milliGRAM(s) SubCutaneous daily  epoetin sara Injectable 68517 Unit(s) SubCutaneous <User Schedule>  furosemide   Injectable 60 milliGRAM(s) IV Push every 12 hours  gabapentin 100 milliGRAM(s) Oral three times a day  insulin glargine Injectable (LANTUS) 20 Unit(s) SubCutaneous at bedtime  insulin lispro (HumaLOG) corrective regimen sliding scale   SubCutaneous Before meals and at bedtime  insulin lispro Injectable (HumaLOG) 3 Unit(s) SubCutaneous three times a day before meals  isosorbide   mononitrate ER Tablet (IMDUR) 60 milliGRAM(s) Oral daily  magnesium oxide 400 milliGRAM(s) Oral daily  methylPREDNISolone sodium succinate Injectable 40 milliGRAM(s) IV Push every 8 hours  Nephro-riya 1 Tablet(s) Oral daily  pantoprazole    Tablet 40 milliGRAM(s) Oral before breakfast  polyethylene glycol 3350 17 Gram(s) Oral daily  senna 2 Tablet(s) Oral at bedtime      MEDICATIONS  (PRN):  acetaminophen   Tablet .. 650 milliGRAM(s) Oral every 6 hours PRN Mild Pain (1 - 3)  ALBUTerol/ipratropium for Nebulization 3 milliLiter(s) Nebulizer every 6 hours PRN Shortness of Breath and/or Wheezing  dextrose 40% Gel 15 Gram(s) Oral once PRN Blood Glucose LESS THAN 70 milliGRAM(s)/deciliter  glucagon  Injectable 1 milliGRAM(s) IntraMuscular once PRN Glucose LESS THAN 70 milligrams/deciliter  glycopyrrolate Injectable 0.1 milliGRAM(s) IV Push every 8 hours PRN secreations  guaiFENesin    Syrup 100 milliGRAM(s) Oral every 6 hours PRN Cough  hydrALAZINE Injectable 10 milliGRAM(s) IV Push every 4 hours PRN sbp> 180  ondansetron Injectable 4 milliGRAM(s) IV Push every 6 hours PRN Nausea and/or Vomiting      Allergies    Accupril (Other)    Intolerances        PAST MEDICAL & SURGICAL HISTORY:  Herniated disc, cervical  PAD (peripheral artery disease)  Legally blind  History of peripheral vascular disease  History of retinal detachment  History of CEA (carotid endarterectomy): Right  Hyperlipidemia  Glaucoma  Hypertension  Diabetes  S/P CABG x 1  After cataract  Uveitic glaucoma of both eyes  Detached retina  Atherosclerosis of right carotid artery   delivery delivered      SOCIAL HISTORY  Smoking History:       REVIEW OF SYSTEMS:    CONSTITUTIONAL:  No distress    HEENT:  Eyes:  No diplopia or blurred vision. ENT:  No earache, sore throat or runny nose.    CARDIOVASCULAR:  No pressure, squeezing, tightness, heaviness or aching about the chest; no palpitations.    RESPIRATORY: per hpi    GASTROINTESTINAL:  No nausea, vomiting or diarrhea.    GENITOURINARY:  No dysuria, frequency or urgency.    MUSCULOSKELETAL:  No joint pain    SKIN:  No new lesions.    NEUROLOGIC:  No paresthesias, fasciculations, seizures or weakness.    PSYCHIATRIC:  No disorder of thought or mood.    ENDOCRINE:  No heat or cold intolerance, polyuria or polydipsia.    HEMATOLOGICAL:  No easy bruising or bleeding.     Vital Signs Last 24 Hrs  T(C): 36.9 (17 Mar 2019 08:00), Max: 36.9 (16 Mar 2019 16:00)  T(F): 98.4 (17 Mar 2019 08:00), Max: 98.4 (16 Mar 2019 16:00)  HR: 66 (17 Mar 2019 10:00) (58 - 75)  BP: 105/47 (17 Mar 2019 10:00) (96/51 - 177/81)  BP(mean): 68 (17 Mar 2019 10:00) (68 - 116)  RR: 20 (17 Mar 2019 10:00) (10 - 33)  SpO2: 100% (17 Mar 2019 10:00) (88% - 100%)    PHYSICAL EXAMINATION:    GENERAL: The patient is awake and alert in no apparent distress.     HEENT: Head is normocephalic and atraumatic. Extraocular muscles are intact. Mucous membranes are moist.    NECK: Supple.    LUNGS:diminished bs clear.    HEART: Regular rate and rhythm without murmur.    ABDOMEN: Soft, nontender, and nondistended.      EXTREMITIES: Without any cyanosis, clubbing, rash, lesions or edema.    NEUROLOGIC: Grossly intact.    SKIN: No ulceration or induration present.      LABS:                        8.2    5.2   )-----------( 144      ( 17 Mar 2019 05:10 )             24.7     03-17    149<H>  |  102  |  87.0<H>  ----------------------------<  225<H>  4.4   |  35.0<H>  |  1.21    Ca    8.6      17 Mar 2019 05:10  Phos  3.2     03-  Mg     2.6     03-17      PT/INR - ( 16 Mar 2019 04:47 )   PT: 14.9 sec;   INR: 1.29 ratio           Urinalysis Basic - ( 15 Mar 2019 14:46 )    Color: Yellow / Appearance: Cloudy / S.010 / pH: x  Gluc: x / Ketone: Negative  / Bili: Negative / Urobili: 1 mg/dL   Blood: x / Protein: 30 mg/dL / Nitrite: Negative   Leuk Esterase: Moderate / RBC: >50 /HPF / WBC 6-10   Sq Epi: x / Non Sq Epi: Occasional / Bacteria: Occasional      ABG - ( 15 Mar 2019 16:58 )  pH, Arterial: 7.51  pH, Blood: x     /  pCO2: 57    /  pO2: 53    / HCO3: 44    / Base Excess: 19.9  /  SaO2: 90                          Procalcitonin, Serum: 0.17 ng/mL (19 @ 04:47)      MICROBIOLOGY:    RADIOLOGY & ADDITIONAL STUDIES:< from: Xray Chest 1 View- PORTABLE-Routine (03.15.19 @ 05:32) >    Impression:  Extensive bilateral pulmonary consolidation with almost complete   opacification of both lungs. No significant change since 3/11/2019..    < end of copied text >

## 2019-03-17 NOTE — PROGRESS NOTE ADULT - ASSESSMENT
62 y/o F with a h/o CAD s/p CABG and PCI, PAD s/p balloon angioplasty, DM2, HTN, HLD, hx of cardiac arrest, recent GIB, Diastolic CHF with RV failure, mod-severe TR, and moderate pulmonary hypertension, legally blind, with acute hypoxemic respiratory failure, acute decompensated heart failure, pleural effusion, possible cryptogenic organizing pneumonia.      1) Acute hypoxemic respiratory failure  - Secondary to worsening b/l lung infiltrates and reaccumulation of pleural effusion. There is certainly an acute CHF/pulmonary edema component given her fragile cardiac status, but there is now some concern for the possibility of a cryptogenic organizing pneumonia. It seems that her symptoms began to worsen as her steroids were tapered off. IV steroid regimen restarted. Continue BiPAP therapy, actively titrating to maintain SaO2 > 92%. Currently with high FiO2 requirement. Failed attempted transition to HFNC. Given her prolonged NIPPV-dependence, she is at high risk for intubation. Will have low threshold to place ETT as this could allow for bronchoscopy and further investigation into infectious etiology. Bedside POCUS showed relatively small fluid pocket in right pleural space. Holding off on thoracentesis at this time. Unfortunately, her prognosis is very poor.     2) Acute decompensated heart failure  -    3) Pleural effusion  - 62 y/o F with a h/o CAD s/p CABG and PCI, PAD s/p balloon angioplasty, DM2, HTN, HLD, hx of cardiac arrest, recent GIB, Diastolic CHF with RV failure, mod-severe TR, and moderate pulmonary hypertension, legally blind, with acute hypoxemic respiratory failure, acute decompensated heart failure, pleural effusion, possible cryptogenic organizing pneumonia.      1) Acute hypoxemic respiratory failure  - Secondary to worsening b/l lung infiltrates and reaccumulation of pleural effusion. There is certainly an acute CHF/pulmonary edema component given her fragile cardiac status, but there is now some concern for the possibility of a cryptogenic organizing pneumonia as it seemed that her symptoms began to worsen as her steroids were tapered off. IV steroid regimen restarted yesterday and she has already improved significantly and was able to be liberated from NIPPV. Back on BiPAP as she experienced some BOONE yesterday after being out of bed Continue BiPAP therapy, actively titrating to maintain SaO2 > 92%. Currently with high FiO2 requirement. Failed attempted transition to HFNC. Given her prolonged NIPPV-dependence, she is at high risk for intubation. Will have low threshold to place ETT as this could allow for bronchoscopy and further investigation into infectious etiology. Bedside POCUS showed relatively small fluid pocket in right pleural space. Holding off on thoracentesis at this time. Unfortunately, her prognosis is very poor.     2) Acute decompensated heart failure  -    3) Pleural effusion  - 64 y/o F with a h/o CAD s/p CABG and PCI, PAD s/p balloon angioplasty, DM2, HTN, HLD, hx of cardiac arrest, recent GIB, Diastolic CHF with RV failure, mod-severe TR, and moderate pulmonary hypertension, legally blind, with acute hypoxemic respiratory failure, acute decompensated heart failure, pleural effusion, possible cryptogenic organizing pneumonia.      1) Acute hypoxemic respiratory failure  - Secondary to worsening b/l lung infiltrates and reaccumulation of pleural effusion. There is certainly an acute CHF/pulmonary edema component given her fragile cardiac status, but there is now some concern for the possibility of a cryptogenic organizing pneumonia as it seemed that her symptoms began to worsen as her steroids were tapered off. IV steroid regimen restarted yesterday and she has already improved significantly and was able to be liberated from NIPPV. Back on BiPAP as she experienced some BOONE yesterday while out of bed. Continue BiPAP therapy, actively titrating to maintain SaO2 > 92%. Wean to NC as tolerated. Would keep BiPAP in room for PRN use and qHS. Respiratory status is very tenuous and she remains at high risk for further decompensation. Unfortunately, her prognosis is very poor.     2) Acute decompensated heart failure  -    3) Pleural effusion  - 62 y/o F with a h/o CAD s/p CABG and PCI, PAD s/p balloon angioplasty, DM2, HTN, HLD, hx of cardiac arrest, recent GIB, Diastolic CHF with RV failure, mod-severe TR, and moderate pulmonary hypertension, legally blind, with acute hypoxemic respiratory failure, acute decompensated heart failure, pleural effusion, possible cryptogenic organizing pneumonia.      1) Acute hypoxemic respiratory failure  - Secondary to worsening b/l lung infiltrates and reaccumulation of pleural effusion. There is certainly an acute CHF/pulmonary edema component given her fragile cardiac status, but there is now concern for the possibility of a cryptogenic organizing pneumonia as it seemed that her symptoms began to worsen as her steroids were tapered off. IV steroid regimen restarted yesterday and she has already improved significantly and was able to be liberated from NIPPV. Back on BiPAP as she experienced some BOONE yesterday while out of bed. Continue BiPAP therapy, actively titrating to maintain SaO2 > 92%. Wean to NC as tolerated. Would keep BiPAP in room for PRN use and qHS. Respiratory status is very tenuous and she remains at high risk for further decompensation. Unfortunately, her prognosis is very poor.     2) Acute decompensated heart failure  - Continue routine IV diuresis. Look to maintain net neg fluid balance. Does not appear to need any inotropic support. KEITH is actually improving and serum Cr has normalized. HR and BP control as necessary. Respiratory support as above.    3) Pleural effusion  - Right pleural effusion did not appear significant enough to require urgent thoracentesis. Will likely continue to build though and will need need to be drained at some point.

## 2019-03-17 NOTE — PROGRESS NOTE ADULT - SUBJECTIVE AND OBJECTIVE BOX
Patient is a 63y old  Female who presents with a chief complaint of SOB (17 Mar 2019 15:20)      BRIEF HOSPITAL COURSE: 64 y/o F with a h/o CAD s/p CABG and PCI, PAD s/p balloon angioplasty, DM2, HTN, HLD, hx of cardiac arrest, recent GIB, Diastolic CHF with RV failure, mod-severe TR, and moderate pulmonary hypertension, legally blind, admitted on  with acute on chronic dCHF exacerbation and has had a prolonged complicated hospital course. Patient experienced recurrent respiratory failure and again became NIPPV-dependent with high FiO2 requirement. Worsening interstitial lung infiltrates with almost complete opacification of both lung fields on CXR. DNR/DNI directives have been rescinded by patient and her family. Transferred to MICU on 3/15.    Events last 24 hours: Tolerated BiPAP overnight, now on NC doing well. Able to tolerate PO diet. Glucose elevated will adjust insulin.     PAST MEDICAL & SURGICAL HISTORY:  Herniated disc, cervical  PAD (peripheral artery disease)  Legally blind  History of peripheral vascular disease  History of retinal detachment  History of CEA (carotid endarterectomy): Right  Hyperlipidemia  Glaucoma  Hypertension  Diabetes  S/P CABG x 1  After cataract  Uveitic glaucoma of both eyes  Detached retina  Atherosclerosis of right carotid artery   delivery delivered      Review of Systems:  CONSTITUTIONAL: No fever, chills, or fatigue  EYES: No eye pain, visual disturbances, or discharge  ENMT:  No difficulty hearing, tinnitus, vertigo; No sinus or throat pain  NECK: No pain or stiffness  RESPIRATORY: No cough, wheezing, chills or hemoptysis; No shortness of breath  CARDIOVASCULAR: No chest pain, palpitations, dizziness, or leg swelling  GASTROINTESTINAL: No abdominal or epigastric pain. No nausea, vomiting, or hematemesis; No diarrhea or constipation. No melena or hematochezia.  GENITOURINARY: No dysuria, frequency, hematuria, or incontinence  NEUROLOGICAL: No headaches, memory loss, loss of strength, numbness, or tremors  SKIN: No itching, burning, rashes, or lesions   MUSCULOSKELETAL: No joint pain or swelling; No muscle, back, or extremity pain  PSYCHIATRIC: No depression, anxiety, mood swings, or difficulty sleeping      Medications:    amLODIPine   Tablet 5 milliGRAM(s) Oral daily  carvedilol 6.25 milliGRAM(s) Oral every 12 hours  furosemide   Injectable 60 milliGRAM(s) IV Push every 12 hours  hydrALAZINE Injectable 10 milliGRAM(s) IV Push every 4 hours PRN  isosorbide   mononitrate ER Tablet (IMDUR) 60 milliGRAM(s) Oral daily    ALBUTerol    0.083% 10 milliGRAM(s) Nebulizer once  ALBUTerol/ipratropium for Nebulization 3 milliLiter(s) Nebulizer every 6 hours PRN  guaiFENesin    Syrup 100 milliGRAM(s) Oral every 6 hours PRN    acetaminophen   Tablet .. 650 milliGRAM(s) Oral every 6 hours PRN  DULoxetine 60 milliGRAM(s) Oral daily  gabapentin 100 milliGRAM(s) Oral three times a day  ondansetron Injectable 4 milliGRAM(s) IV Push every 6 hours PRN      aspirin  chewable 81 milliGRAM(s) Oral daily  enoxaparin Injectable 40 milliGRAM(s) SubCutaneous daily    docusate sodium 100 milliGRAM(s) Oral two times a day  glycopyrrolate Injectable 0.1 milliGRAM(s) IV Push every 8 hours PRN  pantoprazole    Tablet 40 milliGRAM(s) Oral before breakfast  polyethylene glycol 3350 17 Gram(s) Oral daily  senna 2 Tablet(s) Oral at bedtime      atorvastatin 80 milliGRAM(s) Oral at bedtime  dextrose 40% Gel 15 Gram(s) Oral once PRN  dextrose 50% Injectable 12.5 Gram(s) IV Push once  dextrose 50% Injectable 25 Gram(s) IV Push once  dextrose 50% Injectable 25 Gram(s) IV Push once  glucagon  Injectable 1 milliGRAM(s) IntraMuscular once PRN  insulin glargine Injectable (LANTUS) 30 Unit(s) SubCutaneous at bedtime  insulin lispro (HumaLOG) corrective regimen sliding scale   SubCutaneous Before meals and at bedtime  insulin lispro Injectable (HumaLOG) 5 Unit(s) SubCutaneous three times a day before meals  methylPREDNISolone sodium succinate Injectable 40 milliGRAM(s) IV Push every 8 hours    ascorbic acid 500 milliGRAM(s) Oral daily  dextrose 5%. 1000 milliLiter(s) IV Continuous <Continuous>  magnesium oxide 400 milliGRAM(s) Oral daily  Nephro-riya 1 Tablet(s) Oral daily    epoetin sara Injectable 81121 Unit(s) SubCutaneous <User Schedule>              ICU Vital Signs Last 24 Hrs  T(C): 37 (17 Mar 2019 20:40), Max: 37 (17 Mar 2019 20:40)  T(F): 98.6 (17 Mar 2019 20:40), Max: 98.6 (17 Mar 2019 20:40)  HR: 61 (17 Mar 2019 20:40) (58 - 68)  BP: 120/76 (17 Mar 2019 20:40) (95/46 - 151/70)  BP(mean): 91 (17 Mar 2019 20:40) (67 - 100)  ABP: --  ABP(mean): --  RR: 16 (17 Mar 2019 20:40) (14 - 33)  SpO2: 98% (17 Mar 2019 20:40) (91% - 100%)          I&O's Detail    16 Mar 2019 07:01  -  17 Mar 2019 07:00  --------------------------------------------------------  IN:    Oral Fluid: 300 mL  Total IN: 300 mL    OUT:    Indwelling Catheter - Urethral: 4075 mL  Total OUT: 4075 mL    Total NET: -3775 mL      17 Mar 2019 07:01  -  17 Mar 2019 21:03  --------------------------------------------------------  IN:    Oral Fluid: 240 mL  Total IN: 240 mL    OUT:    Indwelling Catheter - Urethral: 1050 mL  Total OUT: 1050 mL    Total NET: -810 mL            LABS:                        8.2    5.2   )-----------( 144      ( 17 Mar 2019 05:10 )             24.7     03-17    149<H>  |  102  |  87.0<H>  ----------------------------<  225<H>  4.4   |  35.0<H>  |  1.21    Ca    8.6      17 Mar 2019 05:10  Phos  3.2     03-17  Mg     2.6     03-17            CAPILLARY BLOOD GLUCOSE      POCT Blood Glucose.: 207 mg/dL (17 Mar 2019 16:55)    PT/INR - ( 16 Mar 2019 04:47 )   PT: 14.9 sec;   INR: 1.29 ratio             CULTURES:  Culture Results:   50,000 - 99,000 CFU/mL Candida albicans (03-15-19 @ 17:55)      Physical Examination:    General: No acute distress.  Alert, oriented, interactive, nonfocal    HEENT: Pupils equal, reactive to light.  Symmetric.    PULM: mild rales b/l bases    CVS: Regular rate and rhythm, no murmurs, rubs, or gallops    ABD: Soft, nondistended, nontender, normoactive bowel sounds, no masses    EXT: No edema, nontender    SKIN: Warm and well perfused, no rashes noted.    NEURO: A&Ox3, strength 5/5 all extremities, cranial nerves grossly intact, no focal deficits    TIME SPENT: 32min   Evaluating/treating patient, reviewing data/labs/imaging, discussing case with multidisciplinary team, discussing plan/goals of care with patient/family. Non-inclusive of procedure time.

## 2019-03-17 NOTE — PROGRESS NOTE ADULT - ASSESSMENT
62 y/o F with a h/o CAD s/p CABG and PCI, PAD s/p balloon angioplasty, DM2, HTN, HLD, hx of cardiac arrest, recent GIB, Diastolic CHF with RV failure, mod-severe TR, and moderate pulmonary hypertension, legally blind, with acute hypoxemic respiratory failure, acute decompensated heart failure, pleural effusion, possible cryptogenic organizing pneumonia.    1) Acute hypoxemic respiratory failure  - Most likely a combination of acute decompensated heart failure from RV failure and severe pulm HTN along with ? cryptogenic organizing PNA. She appears to be doing better now that steroids have been restarted, which goes along with . Tolerating NC duringt he day. Tolerating PO diet. Continue lasix daily, steroids, OOB to chair. BIPAP nocturnally.     2) Acute decompensated heart failure  - Continue routine IV diuresis. Look to maintain net neg fluid balance.    3)DM  - Elevated blood sugar, increase lantus and premeal insulin     Stable for transfer to 4T tele monitoring

## 2019-03-17 NOTE — PROGRESS NOTE ADULT - SUBJECTIVE AND OBJECTIVE BOX
NEPHROLOGY INTERVAL HPI/OVERNIGHT EVENTS:    feels better   OOB to chair   Comfortable  UO 4 L with Lasix     MEDICATIONS  (STANDING):  ALBUTerol    0.083% 10 milliGRAM(s) Nebulizer once  amLODIPine   Tablet 5 milliGRAM(s) Oral daily  ascorbic acid 500 milliGRAM(s) Oral daily  aspirin  chewable 81 milliGRAM(s) Oral daily  atorvastatin 80 milliGRAM(s) Oral at bedtime  carvedilol 6.25 milliGRAM(s) Oral every 12 hours  dextrose 5%. 1000 milliLiter(s) (50 mL/Hr) IV Continuous <Continuous>  dextrose 50% Injectable 12.5 Gram(s) IV Push once  dextrose 50% Injectable 25 Gram(s) IV Push once  dextrose 50% Injectable 25 Gram(s) IV Push once  docusate sodium 100 milliGRAM(s) Oral two times a day  DULoxetine 60 milliGRAM(s) Oral daily  enoxaparin Injectable 40 milliGRAM(s) SubCutaneous daily  epoetin sara Injectable 30341 Unit(s) SubCutaneous <User Schedule>  furosemide   Injectable 60 milliGRAM(s) IV Push every 12 hours  gabapentin 100 milliGRAM(s) Oral three times a day  insulin glargine Injectable (LANTUS) 30 Unit(s) SubCutaneous at bedtime  insulin lispro (HumaLOG) corrective regimen sliding scale   SubCutaneous Before meals and at bedtime  insulin lispro Injectable (HumaLOG) 5 Unit(s) SubCutaneous three times a day before meals  isosorbide   mononitrate ER Tablet (IMDUR) 60 milliGRAM(s) Oral daily  magnesium oxide 400 milliGRAM(s) Oral daily  methylPREDNISolone sodium succinate Injectable 40 milliGRAM(s) IV Push every 8 hours  Nephro-riya 1 Tablet(s) Oral daily  pantoprazole    Tablet 40 milliGRAM(s) Oral before breakfast  polyethylene glycol 3350 17 Gram(s) Oral daily  senna 2 Tablet(s) Oral at bedtime    MEDICATIONS  (PRN):  acetaminophen   Tablet .. 650 milliGRAM(s) Oral every 6 hours PRN Mild Pain (1 - 3)  ALBUTerol/ipratropium for Nebulization 3 milliLiter(s) Nebulizer every 6 hours PRN Shortness of Breath and/or Wheezing  dextrose 40% Gel 15 Gram(s) Oral once PRN Blood Glucose LESS THAN 70 milliGRAM(s)/deciliter  glucagon  Injectable 1 milliGRAM(s) IntraMuscular once PRN Glucose LESS THAN 70 milligrams/deciliter  glycopyrrolate Injectable 0.1 milliGRAM(s) IV Push every 8 hours PRN secreations  guaiFENesin    Syrup 100 milliGRAM(s) Oral every 6 hours PRN Cough  hydrALAZINE Injectable 10 milliGRAM(s) IV Push every 4 hours PRN sbp> 180  ondansetron Injectable 4 milliGRAM(s) IV Push every 6 hours PRN Nausea and/or Vomiting      Allergies    Accupril (Other)    Intolerances        Vital Signs Last 24 Hrs  T(C): 36.9 (17 Mar 2019 08:00), Max: 36.9 (16 Mar 2019 16:00)  T(F): 98.4 (17 Mar 2019 08:00), Max: 98.4 (16 Mar 2019 16:00)  HR: 66 (17 Mar 2019 14:00) (58 - 71)  BP: 143/61 (17 Mar 2019 14:00) (96/51 - 151/70)  BP(mean): 88 (17 Mar 2019 14:00) (68 - 100)  RR: 18 (17 Mar 2019 14:00) (10 - 33)  SpO2: 95% (17 Mar 2019 14:00) (89% - 100%)  Daily     Daily Weight in k.7 (17 Mar 2019 03:19)  I&O's Detail    16 Mar 2019 07:  -  17 Mar 2019 07:00  --------------------------------------------------------  IN:    Oral Fluid: 300 mL  Total IN: 300 mL    OUT:    Indwelling Catheter - Urethral: 4075 mL  Total OUT: 4075 mL    Total NET: -3775 mL      17 Mar 2019 07:01  -  17 Mar 2019 15:20  --------------------------------------------------------  IN:    Oral Fluid: 240 mL  Total IN: 240 mL    OUT:    Indwelling Catheter - Urethral: 1050 mL  Total OUT: 1050 mL    Total NET: -810 mL        I&O's Summary    16 Mar 2019 07:  -  17 Mar 2019 07:00  --------------------------------------------------------  IN: 300 mL / OUT: 4075 mL / NET: -3775 mL    17 Mar 2019 07:  -  17 Mar 2019 15:20  --------------------------------------------------------  IN: 240 mL / OUT: 1050 mL / NET: -810 mL        PHYSICAL EXAM:  Off Bipap now   Cardiovascular: Normal S1 S2, No rub  Respiratory: Clear lungs with decreased BS at bases	  Gastrointestinal:  Soft, Non-tender, + BS	  Extremities: + LE edema    LABS:                        8.2    5.2   )-----------( 144      ( 17 Mar 2019 05:10 )             24.7         149<H>  |  102  |  87.0<H>  ----------------------------<  225<H>  4.4   |  35.0<H>  |  1.21    Ca    8.6      17 Mar 2019 05:10  Phos  3.2       Mg     2.6           PT/INR - ( 16 Mar 2019 04:47 )   PT: 14.9 sec;   INR: 1.29 ratio             Magnesium, Serum: 2.6 mg/dL ( @ 05:10)  Phosphorus Level, Serum: 3.2 mg/dL ( @ 05:10)    ABG - ( 15 Mar 2019 16:58 )  pH, Arterial: 7.51  pH, Blood: x     /  pCO2: 57    /  pO2: 53    / HCO3: 44    / Base Excess: 19.9  /  SaO2: 90                    RADIOLOGY & ADDITIONAL TESTS:

## 2019-03-17 NOTE — PROGRESS NOTE ADULT - SUBJECTIVE AND OBJECTIVE BOX
Patient is a 63y old  Female who presents with a chief complaint of SOB (16 Mar 2019 15:55)      BRIEF HOSPITAL COURSE: 64 y/o F with a h/o CAD s/p CABG and PCI, PAD s/p balloon angioplasty, DM2, HTN, HLD, hx of cardiac arrest, recent GIB, Diastolic CHF with RV failure, mod-severe TR, and moderate pulmonary hypertension, legally blind, admitted on  with acute on chronic dCHF exacerbation and has had a prolonged complicated hospital course. Patient experienced recurrent respiratory failure and again became NIPPV-dependent with high FiO2 requirement. Worsening interstitial lung infiltrates with almost complete opacification of both lung fields on CXR. DNR/DNI directives have been rescinded by patient and her family.     Events last 24 hours: Patient was weaned from NIPPV to NC yesterday and was able to get out of bed to chair. Developed some BOONE and was restarted on BiPAP. Reports her breathing feels better today. Currently without complaints.        PAST MEDICAL & SURGICAL HISTORY:  Herniated disc, cervical  PAD (peripheral artery disease)  Legally blind  History of peripheral vascular disease  History of retinal detachment  History of CEA (carotid endarterectomy): Right  Hyperlipidemia  Glaucoma  Hypertension  Diabetes  S/P CABG x 1  After cataract  Uveitic glaucoma of both eyes  Detached retina  Atherosclerosis of right carotid artery   delivery delivered      Review of Systems:  CONSTITUTIONAL: No fever, chills, or fatigue  EYES: No eye pain, visual disturbances, or discharge  ENMT:  No difficulty hearing, tinnitus, vertigo; No sinus or throat pain  NECK: No pain or stiffness  RESPIRATORY: No cough, wheezing, chills or hemoptysis; No shortness of breath  CARDIOVASCULAR: No chest pain, palpitations, dizziness, or leg swelling  GASTROINTESTINAL: No abdominal or epigastric pain. No nausea, vomiting, or hematemesis; No diarrhea or constipation. No melena or hematochezia.  GENITOURINARY: No dysuria, frequency, hematuria, or incontinence  NEUROLOGICAL: No headaches, memory loss, loss of strength, numbness, or tremors  SKIN: No itching, burning, rashes, or lesions   MUSCULOSKELETAL: No joint pain or swelling; No muscle, back, or extremity pain  PSYCHIATRIC: No depression, anxiety, mood swings, or difficulty sleeping      Medications:    amLODIPine   Tablet 5 milliGRAM(s) Oral daily  carvedilol 6.25 milliGRAM(s) Oral every 12 hours  furosemide   Injectable 60 milliGRAM(s) IV Push every 12 hours  hydrALAZINE Injectable 10 milliGRAM(s) IV Push every 4 hours PRN  isosorbide   mononitrate ER Tablet (IMDUR) 60 milliGRAM(s) Oral daily  ALBUTerol    0.083% 10 milliGRAM(s) Nebulizer once  ALBUTerol/ipratropium for Nebulization 3 milliLiter(s) Nebulizer every 6 hours PRN  guaiFENesin    Syrup 100 milliGRAM(s) Oral every 6 hours PRN  acetaminophen   Tablet .. 650 milliGRAM(s) Oral every 6 hours PRN  DULoxetine 60 milliGRAM(s) Oral daily  gabapentin 100 milliGRAM(s) Oral three times a day  ondansetron Injectable 4 milliGRAM(s) IV Push every 6 hours PRN  aspirin  chewable 81 milliGRAM(s) Oral daily  enoxaparin Injectable 40 milliGRAM(s) SubCutaneous daily  docusate sodium 100 milliGRAM(s) Oral two times a day  glycopyrrolate Injectable 0.1 milliGRAM(s) IV Push every 8 hours PRN  pantoprazole    Tablet 40 milliGRAM(s) Oral before breakfast  polyethylene glycol 3350 17 Gram(s) Oral daily  senna 2 Tablet(s) Oral at bedtime  atorvastatin 80 milliGRAM(s) Oral at bedtime  dextrose 40% Gel 15 Gram(s) Oral once PRN  dextrose 50% Injectable 12.5 Gram(s) IV Push once  dextrose 50% Injectable 25 Gram(s) IV Push once  dextrose 50% Injectable 25 Gram(s) IV Push once  glucagon  Injectable 1 milliGRAM(s) IntraMuscular once PRN  insulin glargine Injectable (LANTUS) 20 Unit(s) SubCutaneous at bedtime  insulin lispro (HumaLOG) corrective regimen sliding scale   SubCutaneous Before meals and at bedtime  insulin lispro Injectable (HumaLOG) 3 Unit(s) SubCutaneous three times a day before meals  methylPREDNISolone sodium succinate Injectable 40 milliGRAM(s) IV Push every 8 hours  ascorbic acid 500 milliGRAM(s) Oral daily  dextrose 5%. 1000 milliLiter(s) IV Continuous <Continuous>  magnesium oxide 400 milliGRAM(s) Oral daily  Nephro-riya 1 Tablet(s) Oral daily  epoetin sara Injectable 20539 Unit(s) SubCutaneous <User Schedule>              ICU Vital Signs Last 24 Hrs  T(C): 36.8 (17 Mar 2019 03:19), Max: 36.9 (16 Mar 2019 16:00)  T(F): 98.2 (17 Mar 2019 03:19), Max: 98.4 (16 Mar 2019 16:00)  HR: 62 (17 Mar 2019 03:44) (59 - 75)  BP: 107/53 (17 Mar 2019 01:00) (107/53 - 177/81)  BP(mean): 77 (17 Mar 2019 01:00) (77 - 116)  ABP: --  ABP(mean): --  RR: 14 (17 Mar 2019 01:00) (9 - 33)  SpO2: 96% (17 Mar 2019 03:44) (88% - 100%)      ABG - ( 15 Mar 2019 16:58 )  pH, Arterial: 7.51  pH, Blood: x     /  pCO2: 57    /  pO2: 53    / HCO3: 44    / Base Excess: 19.9  /  SaO2: 90                  I&O's Detail    15 Mar 2019 07:01  -  16 Mar 2019 07:00  --------------------------------------------------------  IN:  Total IN: 0 mL    OUT:    Indwelling Catheter - Urethral: 4275 mL    Voided: 1000 mL  Total OUT: 5275 mL    Total NET: -5275 mL      16 Mar 2019 07:01  -  17 Mar 2019 04:10  --------------------------------------------------------  IN:    Oral Fluid: 200 mL  Total IN: 200 mL    OUT:    Indwelling Catheter - Urethral: 3400 mL  Total OUT: 3400 mL    Total NET: -3200 mL            LABS:                        8.8    4.9   )-----------( 147      ( 16 Mar 2019 04:47 )             26.8     03-16    149<H>  |  101  |  86.0<H>  ----------------------------<  93  4.3   |  36.0<H>  |  1.18    Ca    8.7      16 Mar 2019 04:47  Mg     2.5     03            CAPILLARY BLOOD GLUCOSE      POCT Blood Glucose.: 258 mg/dL (17 Mar 2019 00:15)    PT/INR - ( 16 Mar 2019 04:47 )   PT: 14.9 sec;   INR: 1.29 ratio           Urinalysis Basic - ( 15 Mar 2019 14:46 )    Color: Yellow / Appearance: Cloudy / S.010 / pH: x  Gluc: x / Ketone: Negative  / Bili: Negative / Urobili: 1 mg/dL   Blood: x / Protein: 30 mg/dL / Nitrite: Negative   Leuk Esterase: Moderate / RBC: >50 /HPF / WBC 6-10   Sq Epi: x / Non Sq Epi: Occasional / Bacteria: Occasional      CULTURES:  Culture Results:   50,000 - 99,000 CFU/mL Candida albicans (03-15-19 @ 17:55)        Physical Examination:    General: no acute distress.  Alert, oriented, interactive, nonfocal, on NIPPV    HEENT: right eye opacified, blind    PULM: scattered rhonchi/rales + diminished bilaterally, no significant sputum production    CVS: Regular rate and rhythm, no murmurs, rubs, or gallops    ABD: Soft, nondistended, nontender, normoactive bowel sounds, no masses    EXT: b/l LE edema, nontender    SKIN: Warm and well perfused, no rashes noted.    NEURO: A&Ox3, strength 5/5 all extremities, cranial nerves grossly intact, no focal deficits        RADIOLOGY:     < from: Xray Chest 1 View- PORTABLE-Routine (03.15.19 @ 05:32) >  Findings:  Cardiomegaly. Extensive bilateral consolidation demonstrating no   significant change since the prior examination..    Impression:  Extensive bilateral pulmonary consolidation with almost complete   opacification of both lungs. No significant change since 3/11/2019.. Patient is a 63y old  Female who presents with a chief complaint of SOB (16 Mar 2019 15:55)      BRIEF HOSPITAL COURSE: 64 y/o F with a h/o CAD s/p CABG and PCI, PAD s/p balloon angioplasty, DM2, HTN, HLD, hx of cardiac arrest, recent GIB, Diastolic CHF with RV failure, mod-severe TR, and moderate pulmonary hypertension, legally blind, admitted on  with acute on chronic dCHF exacerbation and has had a prolonged complicated hospital course. Patient experienced recurrent respiratory failure and again became NIPPV-dependent with high FiO2 requirement. Worsening interstitial lung infiltrates with almost complete opacification of both lung fields on CXR. DNR/DNI directives have been rescinded by patient and her family. Transferred to MICU on 3/15.    Events last 24 hours: Patient was weaned from NIPPV to NC yesterday and was able to get out of bed to chair. Developed some BOONE and was restarted on BiPAP. Reports her breathing feels better today. Currently without complaints.        PAST MEDICAL & SURGICAL HISTORY:  Herniated disc, cervical  PAD (peripheral artery disease)  Legally blind  History of peripheral vascular disease  History of retinal detachment  History of CEA (carotid endarterectomy): Right  Hyperlipidemia  Glaucoma  Hypertension  Diabetes  S/P CABG x 1  After cataract  Uveitic glaucoma of both eyes  Detached retina  Atherosclerosis of right carotid artery   delivery delivered      Review of Systems:  CONSTITUTIONAL: No fever, chills, or fatigue  EYES: No eye pain, visual disturbances, or discharge  ENMT:  No difficulty hearing, tinnitus, vertigo; No sinus or throat pain  NECK: No pain or stiffness  RESPIRATORY: No cough, wheezing, chills or hemoptysis; No shortness of breath  CARDIOVASCULAR: No chest pain, palpitations, dizziness, or leg swelling  GASTROINTESTINAL: No abdominal or epigastric pain. No nausea, vomiting, or hematemesis; No diarrhea or constipation. No melena or hematochezia.  GENITOURINARY: No dysuria, frequency, hematuria, or incontinence  NEUROLOGICAL: No headaches, memory loss, loss of strength, numbness, or tremors  SKIN: No itching, burning, rashes, or lesions   MUSCULOSKELETAL: No joint pain or swelling; No muscle, back, or extremity pain  PSYCHIATRIC: No depression, anxiety, mood swings, or difficulty sleeping      Medications:    amLODIPine   Tablet 5 milliGRAM(s) Oral daily  carvedilol 6.25 milliGRAM(s) Oral every 12 hours  furosemide   Injectable 60 milliGRAM(s) IV Push every 12 hours  hydrALAZINE Injectable 10 milliGRAM(s) IV Push every 4 hours PRN  isosorbide   mononitrate ER Tablet (IMDUR) 60 milliGRAM(s) Oral daily  ALBUTerol    0.083% 10 milliGRAM(s) Nebulizer once  ALBUTerol/ipratropium for Nebulization 3 milliLiter(s) Nebulizer every 6 hours PRN  guaiFENesin    Syrup 100 milliGRAM(s) Oral every 6 hours PRN  acetaminophen   Tablet .. 650 milliGRAM(s) Oral every 6 hours PRN  DULoxetine 60 milliGRAM(s) Oral daily  gabapentin 100 milliGRAM(s) Oral three times a day  ondansetron Injectable 4 milliGRAM(s) IV Push every 6 hours PRN  aspirin  chewable 81 milliGRAM(s) Oral daily  enoxaparin Injectable 40 milliGRAM(s) SubCutaneous daily  docusate sodium 100 milliGRAM(s) Oral two times a day  glycopyrrolate Injectable 0.1 milliGRAM(s) IV Push every 8 hours PRN  pantoprazole    Tablet 40 milliGRAM(s) Oral before breakfast  polyethylene glycol 3350 17 Gram(s) Oral daily  senna 2 Tablet(s) Oral at bedtime  atorvastatin 80 milliGRAM(s) Oral at bedtime  dextrose 40% Gel 15 Gram(s) Oral once PRN  dextrose 50% Injectable 12.5 Gram(s) IV Push once  dextrose 50% Injectable 25 Gram(s) IV Push once  dextrose 50% Injectable 25 Gram(s) IV Push once  glucagon  Injectable 1 milliGRAM(s) IntraMuscular once PRN  insulin glargine Injectable (LANTUS) 20 Unit(s) SubCutaneous at bedtime  insulin lispro (HumaLOG) corrective regimen sliding scale   SubCutaneous Before meals and at bedtime  insulin lispro Injectable (HumaLOG) 3 Unit(s) SubCutaneous three times a day before meals  methylPREDNISolone sodium succinate Injectable 40 milliGRAM(s) IV Push every 8 hours  ascorbic acid 500 milliGRAM(s) Oral daily  dextrose 5%. 1000 milliLiter(s) IV Continuous <Continuous>  magnesium oxide 400 milliGRAM(s) Oral daily  Nephro-riya 1 Tablet(s) Oral daily  epoetin sara Injectable 68689 Unit(s) SubCutaneous <User Schedule>              ICU Vital Signs Last 24 Hrs  T(C): 36.8 (17 Mar 2019 03:19), Max: 36.9 (16 Mar 2019 16:00)  T(F): 98.2 (17 Mar 2019 03:19), Max: 98.4 (16 Mar 2019 16:00)  HR: 62 (17 Mar 2019 03:44) (59 - 75)  BP: 107/53 (17 Mar 2019 01:00) (107/53 - 177/81)  BP(mean): 77 (17 Mar 2019 01:00) (77 - 116)  ABP: --  ABP(mean): --  RR: 14 (17 Mar 2019 01:00) (9 - 33)  SpO2: 96% (17 Mar 2019 03:44) (88% - 100%)      ABG - ( 15 Mar 2019 16:58 )  pH, Arterial: 7.51  pH, Blood: x     /  pCO2: 57    /  pO2: 53    / HCO3: 44    / Base Excess: 19.9  /  SaO2: 90                  I&O's Detail    15 Mar 2019 07:01  -  16 Mar 2019 07:00  --------------------------------------------------------  IN:  Total IN: 0 mL    OUT:    Indwelling Catheter - Urethral: 4275 mL    Voided: 1000 mL  Total OUT: 5275 mL    Total NET: -5275 mL      16 Mar 2019 07:01  -  17 Mar 2019 04:10  --------------------------------------------------------  IN:    Oral Fluid: 200 mL  Total IN: 200 mL    OUT:    Indwelling Catheter - Urethral: 3400 mL  Total OUT: 3400 mL    Total NET: -3200 mL            LABS:                        8.8    4.9   )-----------( 147      ( 16 Mar 2019 04:47 )             26.8     03-16    149<H>  |  101  |  86.0<H>  ----------------------------<  93  4.3   |  36.0<H>  |  1.18    Ca    8.7      16 Mar 2019 04:47  Mg     2.5                 CAPILLARY BLOOD GLUCOSE      POCT Blood Glucose.: 258 mg/dL (17 Mar 2019 00:15)    PT/INR - ( 16 Mar 2019 04:47 )   PT: 14.9 sec;   INR: 1.29 ratio           Urinalysis Basic - ( 15 Mar 2019 14:46 )    Color: Yellow / Appearance: Cloudy / S.010 / pH: x  Gluc: x / Ketone: Negative  / Bili: Negative / Urobili: 1 mg/dL   Blood: x / Protein: 30 mg/dL / Nitrite: Negative   Leuk Esterase: Moderate / RBC: >50 /HPF / WBC 6-10   Sq Epi: x / Non Sq Epi: Occasional / Bacteria: Occasional      CULTURES:  Culture Results:   50,000 - 99,000 CFU/mL Candida albicans (03-15-19 @ 17:55)        Physical Examination:    General: no acute distress.  Alert, oriented, interactive, nonfocal, on NIPPV    HEENT: right eye opacified, blind    PULM: scattered rhonchi/rales + diminished bilaterally, no significant sputum production    CVS: Regular rate and rhythm, no murmurs, rubs, or gallops    ABD: Soft, nondistended, nontender, normoactive bowel sounds, no masses    EXT: b/l LE edema, nontender    SKIN: Warm and well perfused, no rashes noted.    NEURO: A&Ox3, strength 5/5 all extremities, cranial nerves grossly intact, no focal deficits        RADIOLOGY:     < from: Xray Chest 1 View- PORTABLE-Routine (03.15.19 @ 05:32) >  Findings:  Cardiomegaly. Extensive bilateral consolidation demonstrating no   significant change since the prior examination..    Impression:  Extensive bilateral pulmonary consolidation with almost complete   opacification of both lungs. No significant change since 3/11/2019..

## 2019-03-17 NOTE — CHART NOTE - NSCHARTNOTEFT_GEN_A_CORE
· Subjective and Objective:   Patient is a 63y old  Female who presents with a chief complaint of SOB (17 Mar 2019 15:20)      BRIEF HOSPITAL COURSE: Pt is a 64 y/o Female who was admitted on 2019 for with acute on chronic dCHF exacerbation who has a h/o CAD s/p CABG and PCI, PAD s/p balloon angioplasty, DM2, HTN, cardiac arrest, GIB, Diastolic CHF with RV failure, mod-severe TR, moderate pulmonary hypertension, who is legally blind, and has had a prolonged complicated hospital course due to recurrent respiratory failure requiring NIPPV and was dependent on high FiO2 requirement with worsening interstitial lung infiltrates and almost complete opacification of both lung fields on CXR. Pt is now able to tolerate BiPAP prn and overnight without complaints. Of note, fontenot placed again and hematuria with light pink urine noted. Pt denies cp, n/v/d, abdominal pain, HA, cough, fever.     Events last 24 hours: Tolerated BiPAP overnight, now on NC doing well. Able to tolerate PO diet. Glucose elevated will adjust insulin.     PAST MEDICAL & SURGICAL HISTORY:  Herniated disc, cervical  PAD (peripheral artery disease)  Legally blind  History of peripheral vascular disease  History of retinal detachment  History of CEA (carotid endarterectomy): Right  Hyperlipidemia  Glaucoma  Hypertension  Diabetes  S/P CABG x 1  After cataract  Uveitic glaucoma of both eyes  Detached retina  Atherosclerosis of right carotid artery   delivery delivered      Review of Systems:  CONSTITUTIONAL: No fever, chills, or fatigue  EYES: No eye pain, visual disturbances, or discharge  ENMT:  No difficulty hearing, tinnitus, vertigo; No sinus or throat pain  NECK: No pain or stiffness  RESPIRATORY: No cough, wheezing, chills or hemoptysis; No shortness of breath  CARDIOVASCULAR: No chest pain, palpitations, dizziness, or leg swelling  GASTROINTESTINAL: No abdominal or epigastric pain. No nausea, vomiting, or hematemesis; No diarrhea or constipation. No melena or hematochezia.  GENITOURINARY: No dysuria, frequency, hematuria, or incontinence  NEUROLOGICAL: No headaches, memory loss, loss of strength, numbness, or tremors  SKIN: No itching, burning, rashes, or lesions   MUSCULOSKELETAL: No joint pain or swelling; No muscle, back, or extremity pain  PSYCHIATRIC: No depression, anxiety, mood swings, or difficulty sleeping      Medications:    amLODIPine   Tablet 5 milliGRAM(s) Oral daily  carvedilol 6.25 milliGRAM(s) Oral every 12 hours  furosemide   Injectable 60 milliGRAM(s) IV Push every 12 hours  hydrALAZINE Injectable 10 milliGRAM(s) IV Push every 4 hours PRN  isosorbide   mononitrate ER Tablet (IMDUR) 60 milliGRAM(s) Oral daily    ALBUTerol    0.083% 10 milliGRAM(s) Nebulizer once  ALBUTerol/ipratropium for Nebulization 3 milliLiter(s) Nebulizer every 6 hours PRN  guaiFENesin    Syrup 100 milliGRAM(s) Oral every 6 hours PRN    acetaminophen   Tablet .. 650 milliGRAM(s) Oral every 6 hours PRN  DULoxetine 60 milliGRAM(s) Oral daily  gabapentin 100 milliGRAM(s) Oral three times a day  ondansetron Injectable 4 milliGRAM(s) IV Push every 6 hours PRN      aspirin  chewable 81 milliGRAM(s) Oral daily  enoxaparin Injectable 40 milliGRAM(s) SubCutaneous daily    docusate sodium 100 milliGRAM(s) Oral two times a day  glycopyrrolate Injectable 0.1 milliGRAM(s) IV Push every 8 hours PRN  pantoprazole    Tablet 40 milliGRAM(s) Oral before breakfast  polyethylene glycol 3350 17 Gram(s) Oral daily  senna 2 Tablet(s) Oral at bedtime      atorvastatin 80 milliGRAM(s) Oral at bedtime  dextrose 40% Gel 15 Gram(s) Oral once PRN  dextrose 50% Injectable 12.5 Gram(s) IV Push once  dextrose 50% Injectable 25 Gram(s) IV Push once  dextrose 50% Injectable 25 Gram(s) IV Push once  glucagon  Injectable 1 milliGRAM(s) IntraMuscular once PRN  insulin glargine Injectable (LANTUS) 30 Unit(s) SubCutaneous at bedtime  insulin lispro (HumaLOG) corrective regimen sliding scale   SubCutaneous Before meals and at bedtime  insulin lispro Injectable (HumaLOG) 5 Unit(s) SubCutaneous three times a day before meals  methylPREDNISolone sodium succinate Injectable 40 milliGRAM(s) IV Push every 8 hours    ascorbic acid 500 milliGRAM(s) Oral daily  dextrose 5%. 1000 milliLiter(s) IV Continuous <Continuous>  magnesium oxide 400 milliGRAM(s) Oral daily  Nephro-riya 1 Tablet(s) Oral daily    epoetin sara Injectable 85103 Unit(s) SubCutaneous <User Schedule>              ICU Vital Signs Last 24 Hrs  T(C): 37 (17 Mar 2019 20:40), Max: 37 (17 Mar 2019 20:40)  T(F): 98.6 (17 Mar 2019 20:40), Max: 98.6 (17 Mar 2019 20:40)  HR: 61 (17 Mar 2019 20:40) (58 - 68)  BP: 120/76 (17 Mar 2019 20:40) (95/46 - 151/70)  BP(mean): 91 (17 Mar 2019 20:40) (67 - 100)  ABP: --  ABP(mean): --  RR: 16 (17 Mar 2019 20:40) (14 - 33)  SpO2: 98% (17 Mar 2019 20:40) (91% - 100%)          I&O's Detail    16 Mar 2019 07:01  -  17 Mar 2019 07:00  --------------------------------------------------------  IN:    Oral Fluid: 300 mL  Total IN: 300 mL    OUT:    Indwelling Catheter - Urethral: 4075 mL  Total OUT: 4075 mL    Total NET: -3775 mL      17 Mar 2019 07:01  -  17 Mar 2019 21:03  --------------------------------------------------------  IN:    Oral Fluid: 240 mL  Total IN: 240 mL    OUT:    Indwelling Catheter - Urethral: 1050 mL  Total OUT: 1050 mL    Total NET: -810 mL            LABS:                        8.2    5.2   )-----------( 144      ( 17 Mar 2019 05:10 )             24.7     03-17    149<H>  |  102  |  87.0<H>  ----------------------------<  225<H>  4.4   |  35.0<H>  |  1.21    Ca    8.6      17 Mar 2019 05:10  Phos  3.2     03-17  Mg     2.6     -            CAPILLARY BLOOD GLUCOSE      POCT Blood Glucose.: 207 mg/dL (17 Mar 2019 16:55)    PT/INR - ( 16 Mar 2019 04:47 )   PT: 14.9 sec;   INR: 1.29 ratio             CULTURES:  Culture Results:   50,000 - 99,000 CFU/mL Candida albicans (03-15-19 @ 17:55)      Physical Examination:    General: No acute distress.  Alert, oriented, interactive, nonfocal    HEENT: Pupils equal, reactive to light.  Symmetric.    PULM: mild rales b/l bases    CVS: Regular rate and rhythm, no murmurs, rubs, or gallops    ABD: Soft, nondistended, nontender, normoactive bowel sounds, no masses    EXT: No edema, nontender    SKIN: Warm and well perfused, no rashes noted.    NEURO: A&Ox3, strength 5/5 all extremities, cranial nerves grossly intact, no focal deficits    TIME SPENT: 32min   Evaluating/treating patient, reviewing data/labs/imaging, discussing case with multidisciplinary team, discussing plan/goals of care with patient/family. Non-inclusive of procedure time.      Assessment and Plan:   · Assessment	  64 y/o F with a h/o CAD s/p CABG and PCI, PAD s/p balloon angioplasty, DM2, HTN, HLD, hx of cardiac arrest, recent GIB, Diastolic CHF with RV failure, mod-severe TR, and moderate pulmonary hypertension, legally blind, with acute hypoxemic respiratory failure, acute decompensated heart failure, pleural effusion, possible cryptogenic organizing pneumonia.    1) Acute hypoxemic respiratory failure  - Most likely a combination of acute decompensated heart failure from RV failure and severe pulm HTN along with ? cryptogenic organizing PNA. She appears to be doing better now that steroids have been restarted, which goes along with . Tolerating NC duringt he day. Tolerating PO diet. Continue lasix daily, steroids, OOB to chair. BIPAP nocturnally.     2) Acute decompensated heart failure  - Continue routine IV diuresis. Look to maintain net neg fluid balance.    3)DM  - Elevated blood sugar, increase lantus and premeal insulin     Stable for transfer to  tele monitoring BRIEF HOSPITAL COURSE: Pt is a 62 y/o Female who was admitted on 2019 for with acute on chronic dCHF exacerbation who has a h/o CAD s/p CABG and PCI, PAD s/p balloon angioplasty, DM2, HTN, cardiac arrest, GIB, Diastolic CHF with RV failure, mod-severe TR, moderate pulmonary hypertension, who is legally blind, and has had a prolonged complicated hospital course due to recurrent respiratory failure requiring NIPPV and was dependent on high FiO2 requirement with worsening interstitial lung infiltrates and almost complete opacification of both lung fields on CXR. Pt is now able to tolerate BiPAP prn and overnight and tolerate PO diet without complaints. Of note, fontenot placed again and hematuria with light pink urine noted. Pt denies cp, n/v/d, abdominal pain, HA, cough, fever.     PAST MEDICAL & SURGICAL HISTORY:  Herniated disc, cervical  PAD (peripheral artery disease)  Legally blind  History of peripheral vascular disease  History of retinal detachment  History of CEA (carotid endarterectomy): Right  Hyperlipidemia  Glaucoma  Hypertension  Diabetes  S/P CABG x 1  After cataract  Uveitic glaucoma of both eyes  Detached retina  Atherosclerosis of right carotid artery   delivery delivered    FAMILY HISTORY: not able to obtain, patient unsure of parents history    SOCIAL HISTORY: No etoh/smoking/drug abuse, full code as family rescinded MOLST    Review of Systems:  CONSTITUTIONAL: No fever, chills, or fatigue  EYES: No eye pain, visual disturbances, or discharge  ENMT:  No difficulty hearing, tinnitus, vertigo; No sinus or throat pain  NECK: No pain or stiffness  RESPIRATORY: No cough, wheezing, chills or hemoptysis; No shortness of breath  CARDIOVASCULAR: No chest pain, palpitations, dizziness, or leg swelling  GASTROINTESTINAL: No abdominal or epigastric pain. No nausea, vomiting, or hematemesis; No diarrhea or constipation. No melena or hematochezia.  GENITOURINARY: No dysuria, frequency, hematuria, or incontinence  NEUROLOGICAL: No headaches, memory loss, loss of strength, numbness, or tremors  SKIN: No itching, burning, rashes, or lesions   MUSCULOSKELETAL: No joint pain or swelling; No muscle, back, or extremity pain  PSYCHIATRIC: No depression, anxiety, mood swings, or difficulty sleeping    MEDICATIONS  (STANDING):  ALBUTerol    0.083% 10 milliGRAM(s) Nebulizer once  amLODIPine   Tablet 5 milliGRAM(s) Oral daily  ascorbic acid 500 milliGRAM(s) Oral daily  aspirin  chewable 81 milliGRAM(s) Oral daily  atorvastatin 80 milliGRAM(s) Oral at bedtime  carvedilol 6.25 milliGRAM(s) Oral every 12 hours  dextrose 5%. 1000 milliLiter(s) (50 mL/Hr) IV Continuous <Continuous>  dextrose 50% Injectable 12.5 Gram(s) IV Push once  dextrose 50% Injectable 25 Gram(s) IV Push once  dextrose 50% Injectable 25 Gram(s) IV Push once  docusate sodium 100 milliGRAM(s) Oral two times a day  DULoxetine 60 milliGRAM(s) Oral daily  enoxaparin Injectable 40 milliGRAM(s) SubCutaneous daily  epoetin sara Injectable 74012 Unit(s) SubCutaneous <User Schedule>  furosemide   Injectable 60 milliGRAM(s) IV Push every 12 hours  gabapentin 100 milliGRAM(s) Oral three times a day  insulin glargine Injectable (LANTUS) 30 Unit(s) SubCutaneous at bedtime  insulin lispro (HumaLOG) corrective regimen sliding scale   SubCutaneous Before meals and at bedtime  insulin lispro Injectable (HumaLOG) 5 Unit(s) SubCutaneous three times a day before meals  isosorbide   mononitrate ER Tablet (IMDUR) 60 milliGRAM(s) Oral daily  magnesium oxide 400 milliGRAM(s) Oral daily  methylPREDNISolone sodium succinate Injectable 40 milliGRAM(s) IV Push every 8 hours  Nephro-riya 1 Tablet(s) Oral daily  pantoprazole    Tablet 40 milliGRAM(s) Oral before breakfast  polyethylene glycol 3350 17 Gram(s) Oral daily  senna 2 Tablet(s) Oral at bedtime    MEDICATIONS  (PRN):  acetaminophen   Tablet .. 650 milliGRAM(s) Oral every 6 hours PRN Mild Pain (1 - 3)  ALBUTerol/ipratropium for Nebulization 3 milliLiter(s) Nebulizer every 6 hours PRN Shortness of Breath and/or Wheezing  dextrose 40% Gel 15 Gram(s) Oral once PRN Blood Glucose LESS THAN 70 milliGRAM(s)/deciliter  glucagon  Injectable 1 milliGRAM(s) IntraMuscular once PRN Glucose LESS THAN 70 milligrams/deciliter  glycopyrrolate Injectable 0.1 milliGRAM(s) IV Push every 8 hours PRN secreations  guaiFENesin    Syrup 100 milliGRAM(s) Oral every 6 hours PRN Cough  hydrALAZINE Injectable 10 milliGRAM(s) IV Push every 4 hours PRN sbp> 180  ondansetron Injectable 4 milliGRAM(s) IV Push every 6 hours PRN Nausea and/or Vomiting    CBC Full  -  ( 17 Mar 2019 05:10 )  WBC Count : 5.2 K/uL  Hemoglobin : 8.2 g/dL  Hematocrit : 24.7 %  Platelet Count - Automated : 144 K/uL  Mean Cell Volume : 89.5 fl  Mean Cell Hemoglobin : 29.7 pg  Mean Cell Hemoglobin Concentration : 33.2 g/dL  Auto Neutrophil # : x  Auto Lymphocyte # : x  Auto Monocyte # : x  Auto Eosinophil # : x  Auto Basophil # : x  Auto Neutrophil % : x  Auto Lymphocyte % : x  Auto Monocyte % : x  Auto Eosinophil % : x  Auto Basophil % : x    03-    149<H>  |  102  |  87.0<H>  ----------------------------<  225<H>  4.4   |  35.0<H>  |  1.21    Ca    8.6      17 Mar 2019 05:10  Phos  3.2       Mg     2.6     -17    PT/INR - ( 16 Mar 2019 04:47 )   PT: 14.9 sec;   INR: 1.29 ratio      CULTURES:  Culture Results:   50,000 - 99,000 CFU/mL Candida albicans (03-15-19 @ 17:55)    Physical Examination:  General: NAD, resting comfortably in bed   HEENT: PERRLA  PULM: b/l rales, decreased air entry   CVS: RRR  GI: Soft, nondistended, nontender, normoactive bowel sounds  EXT: No edema, nontender, pulses difficult to appreciate  NEURO: A&Ox3, strength 5/5 all extremities, cranial nerves grossly intact, no focal deficits  : Fontenot with light pink urine    Assessment and Plan:   62 y/o Female with a h/o CAD s/p CABG and PCI, PAD s/p balloon angioplasty, DM2, HTN, HLD, hx of cardiac arrest, recent GIB, Diastolic CHF with RV failure, mod-severe TR, and moderate pulmonary hypertension, admitted with acute hypoxemic respiratory failure and acute decompensated CHF complicated by pleural effusion concerning for possible cryptogenic organizing pneumonia.    1) Acute hypoxemic respiratory failure- resolving, in setting of severe pulmonary HTN and CHF  -concerning for cryptogenic organizing PNA  -cont steroids as has had significant improvement and as discussed with ICU PA, is consistent with this type of pneumonia.   -cont lasix  -cont BIPAP prn      2) Acute decompensated heart failure  - Continue routine IV diuresis.  - daily weight  - strict i/o's    3)DM II  - HISS  - cont premeal  - cont lantus    4) Hematuria  - consider urology consult if does not resolve, urine very light pink and clear  - fontenot placed for retention

## 2019-03-18 DIAGNOSIS — J84.116 CRYPTOGENIC ORGANIZING PNEUMONIA: ICD-10-CM

## 2019-03-18 LAB
ANION GAP SERPL CALC-SCNC: 10 MMOL/L — SIGNIFICANT CHANGE UP (ref 5–17)
BUN SERPL-MCNC: 73 MG/DL — HIGH (ref 8–20)
CALCIUM SERPL-MCNC: 9 MG/DL — SIGNIFICANT CHANGE UP (ref 8.6–10.2)
CHLORIDE SERPL-SCNC: 102 MMOL/L — SIGNIFICANT CHANGE UP (ref 98–107)
CO2 SERPL-SCNC: 38 MMOL/L — HIGH (ref 22–29)
CREAT SERPL-MCNC: 1.06 MG/DL — SIGNIFICANT CHANGE UP (ref 0.5–1.3)
GLUCOSE BLDC GLUCOMTR-MCNC: 115 MG/DL — HIGH (ref 70–99)
GLUCOSE BLDC GLUCOMTR-MCNC: 165 MG/DL — HIGH (ref 70–99)
GLUCOSE BLDC GLUCOMTR-MCNC: 198 MG/DL — HIGH (ref 70–99)
GLUCOSE BLDC GLUCOMTR-MCNC: 286 MG/DL — HIGH (ref 70–99)
GLUCOSE SERPL-MCNC: 143 MG/DL — HIGH (ref 70–115)
HCT VFR BLD CALC: 26.5 % — LOW (ref 37–47)
HGB BLD-MCNC: 8.8 G/DL — LOW (ref 12–16)
MAGNESIUM SERPL-MCNC: 2.5 MG/DL — SIGNIFICANT CHANGE UP (ref 1.6–2.6)
MCHC RBC-ENTMCNC: 29.6 PG — SIGNIFICANT CHANGE UP (ref 27–31)
MCHC RBC-ENTMCNC: 33.2 G/DL — SIGNIFICANT CHANGE UP (ref 32–36)
MCV RBC AUTO: 89.2 FL — SIGNIFICANT CHANGE UP (ref 81–99)
PHOSPHATE SERPL-MCNC: 3.3 MG/DL — SIGNIFICANT CHANGE UP (ref 2.4–4.7)
PLATELET # BLD AUTO: 148 K/UL — LOW (ref 150–400)
POTASSIUM SERPL-MCNC: 4 MMOL/L — SIGNIFICANT CHANGE UP (ref 3.5–5.3)
POTASSIUM SERPL-SCNC: 4 MMOL/L — SIGNIFICANT CHANGE UP (ref 3.5–5.3)
RBC # BLD: 2.97 M/UL — LOW (ref 4.4–5.2)
RBC # FLD: 17.5 % — HIGH (ref 11–15.6)
SODIUM SERPL-SCNC: 150 MMOL/L — HIGH (ref 135–145)
WBC # BLD: 5.8 K/UL — SIGNIFICANT CHANGE UP (ref 4.8–10.8)
WBC # FLD AUTO: 5.8 K/UL — SIGNIFICANT CHANGE UP (ref 4.8–10.8)

## 2019-03-18 PROCEDURE — 99231 SBSQ HOSP IP/OBS SF/LOW 25: CPT

## 2019-03-18 PROCEDURE — 99233 SBSQ HOSP IP/OBS HIGH 50: CPT

## 2019-03-18 PROCEDURE — 99232 SBSQ HOSP IP/OBS MODERATE 35: CPT

## 2019-03-18 RX ADMIN — Medication 500 MILLIGRAM(S): at 12:19

## 2019-03-18 RX ADMIN — Medication 5 UNIT(S): at 17:59

## 2019-03-18 RX ADMIN — Medication 100 MILLIGRAM(S): at 17:58

## 2019-03-18 RX ADMIN — Medication 2: at 13:04

## 2019-03-18 RX ADMIN — ISOSORBIDE MONONITRATE 60 MILLIGRAM(S): 60 TABLET, EXTENDED RELEASE ORAL at 12:19

## 2019-03-18 RX ADMIN — MAGNESIUM OXIDE 400 MG ORAL TABLET 400 MILLIGRAM(S): 241.3 TABLET ORAL at 12:19

## 2019-03-18 RX ADMIN — ATORVASTATIN CALCIUM 80 MILLIGRAM(S): 80 TABLET, FILM COATED ORAL at 22:35

## 2019-03-18 RX ADMIN — Medication 5 UNIT(S): at 08:10

## 2019-03-18 RX ADMIN — ENOXAPARIN SODIUM 40 MILLIGRAM(S): 100 INJECTION SUBCUTANEOUS at 22:25

## 2019-03-18 RX ADMIN — Medication 60 MILLIGRAM(S): at 17:58

## 2019-03-18 RX ADMIN — Medication 60 MILLIGRAM(S): at 06:47

## 2019-03-18 RX ADMIN — Medication 40 MILLIGRAM(S): at 06:47

## 2019-03-18 RX ADMIN — GABAPENTIN 100 MILLIGRAM(S): 400 CAPSULE ORAL at 06:47

## 2019-03-18 RX ADMIN — Medication 2: at 17:58

## 2019-03-18 RX ADMIN — INSULIN GLARGINE 30 UNIT(S): 100 INJECTION, SOLUTION SUBCUTANEOUS at 22:35

## 2019-03-18 RX ADMIN — Medication 5 UNIT(S): at 13:06

## 2019-03-18 RX ADMIN — Medication 81 MILLIGRAM(S): at 12:19

## 2019-03-18 RX ADMIN — PANTOPRAZOLE SODIUM 40 MILLIGRAM(S): 20 TABLET, DELAYED RELEASE ORAL at 06:47

## 2019-03-18 RX ADMIN — CARVEDILOL PHOSPHATE 6.25 MILLIGRAM(S): 80 CAPSULE, EXTENDED RELEASE ORAL at 17:58

## 2019-03-18 RX ADMIN — Medication 40 MILLIGRAM(S): at 13:07

## 2019-03-18 RX ADMIN — CARVEDILOL PHOSPHATE 6.25 MILLIGRAM(S): 80 CAPSULE, EXTENDED RELEASE ORAL at 06:47

## 2019-03-18 RX ADMIN — Medication 6: at 22:26

## 2019-03-18 RX ADMIN — GABAPENTIN 100 MILLIGRAM(S): 400 CAPSULE ORAL at 13:04

## 2019-03-18 RX ADMIN — DULOXETINE HYDROCHLORIDE 60 MILLIGRAM(S): 30 CAPSULE, DELAYED RELEASE ORAL at 12:19

## 2019-03-18 RX ADMIN — Medication 1 TABLET(S): at 13:04

## 2019-03-18 RX ADMIN — GABAPENTIN 100 MILLIGRAM(S): 400 CAPSULE ORAL at 22:25

## 2019-03-18 RX ADMIN — Medication 40 MILLIGRAM(S): at 22:26

## 2019-03-18 RX ADMIN — AMLODIPINE BESYLATE 5 MILLIGRAM(S): 2.5 TABLET ORAL at 06:47

## 2019-03-18 NOTE — PROGRESS NOTE ADULT - NSICDXPROBLEM_GEN_ALL_CORE_FT
PROBLEM DIAGNOSES  Problem: Cryptogenic organizing pneumonia  Assessment and Plan: cont steroids    Problem: Acute hypoxemic respiratory failure  Assessment and Plan: Tolerating nasal canula  bipap PRN    Problem: Acute decompensated heart failure  Assessment and Plan: Cont cardiac meds    Problem: Functional quadriplegia  Assessment and Plan: Assist wtih care    Problem: Encounter for palliative care  Assessment and Plan: Patient clinically better with addition  of steroids.  Though still has underlying DHF, Pulm HTN.  Will cont to monitor hospital course  Family rescinded DNR/i      Problem: Acute on chronic diastolic (congestive) heart failure  Assessment and Plan: cont Lasix    Problem: Pleural effusion  Assessment and Plan: s/p CT - removed    Problem: Acute on chronic respiratory failure with hypoxia and hypercapnia  Assessment and Plan: On Bipap  MICU consult    Problem: Obesity hypoventilation syndrome  Assessment and Plan: O2 support    Problem: Acute respiratory failure with hypercapnia  Assessment and Plan: on continuous Bipap  ICU consult called

## 2019-03-18 NOTE — PROGRESS NOTE ADULT - SUBJECTIVE AND OBJECTIVE BOX
CC: follow up GOC  INTERVAL HPI/OVERNIGHT EVENTS:  off Bipap  on steroids for   PRESENT SYMPTOMS: SOURCE:  Patient/Family/Team    PAIN SCALE:  0 = none  1 = mild   2 = moderate  3 = severe    Pain: denies    Dyspnea:  [ ] YES [x ] NO  Anxiety:  [ ] YES [x ] NO  Fatigue: [x ] YES [ ] NO  Nausea: [ ] YES [ x] NO  Loss of Appetite: x[x ] YES [ ] NO  Other symptoms: __________    MEDICATIONS  (STANDING):  ALBUTerol    0.083% 10 milliGRAM(s) Nebulizer once  amLODIPine   Tablet 5 milliGRAM(s) Oral daily  ascorbic acid 500 milliGRAM(s) Oral daily  aspirin  chewable 81 milliGRAM(s) Oral daily  atorvastatin 80 milliGRAM(s) Oral at bedtime  carvedilol 6.25 milliGRAM(s) Oral every 12 hours  dextrose 5%. 1000 milliLiter(s) (50 mL/Hr) IV Continuous <Continuous>  dextrose 50% Injectable 12.5 Gram(s) IV Push once  dextrose 50% Injectable 25 Gram(s) IV Push once  dextrose 50% Injectable 25 Gram(s) IV Push once  docusate sodium 100 milliGRAM(s) Oral two times a day  DULoxetine 60 milliGRAM(s) Oral daily  enoxaparin Injectable 40 milliGRAM(s) SubCutaneous daily  epoetin sara Injectable 67768 Unit(s) SubCutaneous <User Schedule>  furosemide   Injectable 60 milliGRAM(s) IV Push every 12 hours  gabapentin 100 milliGRAM(s) Oral three times a day  insulin glargine Injectable (LANTUS) 30 Unit(s) SubCutaneous at bedtime  insulin lispro (HumaLOG) corrective regimen sliding scale   SubCutaneous Before meals and at bedtime  insulin lispro Injectable (HumaLOG) 5 Unit(s) SubCutaneous three times a day before meals  isosorbide   mononitrate ER Tablet (IMDUR) 60 milliGRAM(s) Oral daily  magnesium oxide 400 milliGRAM(s) Oral daily  methylPREDNISolone sodium succinate Injectable 40 milliGRAM(s) IV Push every 8 hours  Nephro-riya 1 Tablet(s) Oral daily  pantoprazole    Tablet 40 milliGRAM(s) Oral before breakfast  polyethylene glycol 3350 17 Gram(s) Oral daily  senna 2 Tablet(s) Oral at bedtime    MEDICATIONS  (PRN):  acetaminophen   Tablet .. 650 milliGRAM(s) Oral every 6 hours PRN Mild Pain (1 - 3)  ALBUTerol/ipratropium for Nebulization 3 milliLiter(s) Nebulizer every 6 hours PRN Shortness of Breath and/or Wheezing  dextrose 40% Gel 15 Gram(s) Oral once PRN Blood Glucose LESS THAN 70 milliGRAM(s)/deciliter  glucagon  Injectable 1 milliGRAM(s) IntraMuscular once PRN Glucose LESS THAN 70 milligrams/deciliter  glycopyrrolate Injectable 0.1 milliGRAM(s) IV Push every 8 hours PRN secreations  guaiFENesin    Syrup 100 milliGRAM(s) Oral every 6 hours PRN Cough  hydrALAZINE Injectable 10 milliGRAM(s) IV Push every 4 hours PRN sbp> 180  ondansetron Injectable 4 milliGRAM(s) IV Push every 6 hours PRN Nausea and/or Vomiting      Allergies    Accupril (Other)    Intolerances        Karnofsky Performance Score/Palliative Performance Status Version 2:  30       %    Vital Signs Last 24 Hrs  T(C): 36.3 (18 Mar 2019 06:38), Max: 37 (17 Mar 2019 20:40)  T(F): 97.4 (18 Mar 2019 06:38), Max: 98.6 (17 Mar 2019 20:40)  HR: 63 (18 Mar 2019 12:12) (59 - 65)  BP: 132/55 (18 Mar 2019 12:12) (95/46 - 132/55)  BP(mean): 91 (17 Mar 2019 20:40) (67 - 91)  RR: 18 (18 Mar 2019 12:12) (14 - 22)  SpO2: 100% (18 Mar 2019 12:12) (93% - 100%)    PHYSICAL EXAM:    General: Obese alert NAD    HEENT: [ x] normal  [ ] dry mouth  [ ] ET tube/trach    Lungs: [ x] comfortable nasal canula  CV: [x  GI: [x ] normal  [ ] distended  [ ] tender  [ ] no BS               [ ] PEG/NG/OG tube    : [ x] normal  [ ] incontinent  [ ] oliguria/anuria  [x] fontenot    MSK: [ ] normal  [ xweakness  [ ] edema             [ ] ambulatory  [ ]x bedbound/wheelchair bound    Skin: [ ] normal  [ ] pressure ulcers- Stage_____  [ x] no rash    LABS:                        8.8    5.8   )-----------( 148      ( 18 Mar 2019 06:52 )             26.5     03-18    150<H>  |  102  |  73.0<H>  ----------------------------<  143<H>  4.0   |  38.0<H>  |  1.06    Ca    9.0      18 Mar 2019 06:52  Phos  3.3     03-18  Mg     2.5     03-18          I&O's Summary    17 Mar 2019 07:01  -  18 Mar 2019 07:00  --------------------------------------------------------  IN: 420 mL / OUT: 2700 mL / NET: -2280 mL    18 Mar 2019 07:01  -  18 Mar 2019 14:43  --------------------------------------------------------  IN: 120 mL / OUT: 0 mL / NET: 120 mL        RADIOLOGY & ADDITIONAL STUDIES:  < from: Xray Chest 1 View- PORTABLE-Routine (03.15.19 @ 05:32) >   EXAM:  XR CHEST PORTABLE ROUTINE 1V                          PROCEDURE DATE:  03/15/2019          INTERPRETATION:  CHEST AP PORTABLE:    History: SOB.     Date and time of exam: 3/15/2019 4:46 AM.    Technique: A single AP view of the chest was obtained.    Comparison exam: 3/11/2019 11:35 AM.    Findings:  Cardiomegaly. Extensive bilateral consolidation demonstrating no   significant change since the prior examination..    Impression:  Extensive bilateral pulmonary consolidation with almost complete   opacification of both lungs. No significant change since 3/11/2019..      < end of copied text >      Thank you for the opportunity to assist with the care of this patient.   [facility] [phone].

## 2019-03-18 NOTE — PROGRESS NOTE ADULT - ASSESSMENT
Improved  continue bipap nocturnal ans prn  taper medrol- wean to prednisone  follow CXR  gentle diuresis, follow lytes  prognosis guarded, now DNR

## 2019-03-18 NOTE — PROGRESS NOTE ADULT - SUBJECTIVE AND OBJECTIVE BOX
PULMONARY PROGRESS NOTE      RANJIT ROCHA-[id number]    Patient is a 63y old  Female who presents with a chief complaint of SOB (18 Mar 2019 14:43)      INTERVAL HPI/OVERNIGHT EVENTS:  feels better  off bipap  no chest pain  MEDICATIONS  (STANDING):  ALBUTerol    0.083% 10 milliGRAM(s) Nebulizer once  amLODIPine   Tablet 5 milliGRAM(s) Oral daily  ascorbic acid 500 milliGRAM(s) Oral daily  aspirin  chewable 81 milliGRAM(s) Oral daily  atorvastatin 80 milliGRAM(s) Oral at bedtime  carvedilol 6.25 milliGRAM(s) Oral every 12 hours  dextrose 5%. 1000 milliLiter(s) (50 mL/Hr) IV Continuous <Continuous>  dextrose 50% Injectable 12.5 Gram(s) IV Push once  dextrose 50% Injectable 25 Gram(s) IV Push once  dextrose 50% Injectable 25 Gram(s) IV Push once  docusate sodium 100 milliGRAM(s) Oral two times a day  DULoxetine 60 milliGRAM(s) Oral daily  enoxaparin Injectable 40 milliGRAM(s) SubCutaneous daily  epoetin sara Injectable 95047 Unit(s) SubCutaneous <User Schedule>  furosemide   Injectable 60 milliGRAM(s) IV Push every 12 hours  gabapentin 100 milliGRAM(s) Oral three times a day  insulin glargine Injectable (LANTUS) 30 Unit(s) SubCutaneous at bedtime  insulin lispro (HumaLOG) corrective regimen sliding scale   SubCutaneous Before meals and at bedtime  insulin lispro Injectable (HumaLOG) 5 Unit(s) SubCutaneous three times a day before meals  isosorbide   mononitrate ER Tablet (IMDUR) 60 milliGRAM(s) Oral daily  magnesium oxide 400 milliGRAM(s) Oral daily  methylPREDNISolone sodium succinate Injectable 40 milliGRAM(s) IV Push every 8 hours  Nephro-riya 1 Tablet(s) Oral daily  pantoprazole    Tablet 40 milliGRAM(s) Oral before breakfast  polyethylene glycol 3350 17 Gram(s) Oral daily  senna 2 Tablet(s) Oral at bedtime      MEDICATIONS  (PRN):  acetaminophen   Tablet .. 650 milliGRAM(s) Oral every 6 hours PRN Mild Pain (1 - 3)  ALBUTerol/ipratropium for Nebulization 3 milliLiter(s) Nebulizer every 6 hours PRN Shortness of Breath and/or Wheezing  dextrose 40% Gel 15 Gram(s) Oral once PRN Blood Glucose LESS THAN 70 milliGRAM(s)/deciliter  glucagon  Injectable 1 milliGRAM(s) IntraMuscular once PRN Glucose LESS THAN 70 milligrams/deciliter  glycopyrrolate Injectable 0.1 milliGRAM(s) IV Push every 8 hours PRN secreations  guaiFENesin    Syrup 100 milliGRAM(s) Oral every 6 hours PRN Cough  hydrALAZINE Injectable 10 milliGRAM(s) IV Push every 4 hours PRN sbp> 180  ondansetron Injectable 4 milliGRAM(s) IV Push every 6 hours PRN Nausea and/or Vomiting      Allergies    Accupril (Other)    Intolerances        PAST MEDICAL & SURGICAL HISTORY:  Herniated disc, cervical  PAD (peripheral artery disease)  Legally blind  History of peripheral vascular disease  History of retinal detachment  History of CEA (carotid endarterectomy): Right  Hyperlipidemia  Glaucoma  Hypertension  Diabetes  S/P CABG x 1  After cataract  Uveitic glaucoma of both eyes  Detached retina  Atherosclerosis of right carotid artery   delivery delivered      SOCIAL HISTORY  Smoking History:       REVIEW OF SYSTEMS:    CONSTITUTIONAL:  No distress    HEENT:  Eyes:  No diplopia or blurred vision. ENT:  No earache, sore throat or runny nose.    CARDIOVASCULAR:  No pressure, squeezing, tightness, heaviness or aching about the chest; no palpitations.    RESPIRATORY:  see HPI    GASTROINTESTINAL:  No nausea, vomiting or diarrhea.    GENITOURINARY:  No dysuria, frequency or urgency.    NEUROLOGIC:  No paresthesias, fasciculations, seizures or weakness.    PSYCHIATRIC:  No disorder of thought or mood.    Vital Signs Last 24 Hrs  T(C): 36.4 (18 Mar 2019 15:52), Max: 37 (17 Mar 2019 20:40)  T(F): 97.5 (18 Mar 2019 15:52), Max: 98.6 (17 Mar 2019 20:40)  HR: 64 (18 Mar 2019 15:52) (59 - 64)  BP: 143/70 (18 Mar 2019 15:52) (114/56 - 143/70)  BP(mean): 91 (17 Mar 2019 20:40) (80 - 91)  RR: 18 (18 Mar 2019 15:52) (14 - 22)  SpO2: 94% (18 Mar 2019 15:52) (93% - 100%)    PHYSICAL EXAMINATION:    GENERAL: The patient is awake and alert in no apparent distress.     HEENT: Head is normocephalic and atraumatic. Extraocular muscles are intact. Mucous membranes are moist.    NECK: Supple.    LUNGS: moderate air entry bilat  without wheezing, rales or rhonchi; respirations unlabored    HEART: Regular rate and rhythm without murmur.    ABDOMEN: Soft, nontender, and nondistended.      EXTREMITIES: Without any cyanosis, clubbing, rash, lesions or edema.    NEUROLOGIC: Grossly intact.    LABS:                        8.8    5.8   )-----------( 148      ( 18 Mar 2019 06:52 )             26.5     03-18    150<H>  |  102  |  73.0<H>  ----------------------------<  143<H>  4.0   |  38.0<H>  |  1.06    Ca    9.0      18 Mar 2019 06:52  Phos  3.3     03-18  Mg     2.5     -18                      Procalcitonin, Serum: 0.17 ng/mL (19 @ 04:47)      MICROBIOLOGY:    RADIOLOGY & ADDITIONAL STUDIES:  cxr and CT scans reviewed

## 2019-03-18 NOTE — PROGRESS NOTE ADULT - ASSESSMENT
63 year old woman, with  multiple medical issues-  OHS, CAD, CABG,  PAD s/p balloon angioplasty, diastolic HF, DM admitted with  with SOB and LE edema with hyperkalemia and anemia.   S/p Acute hypoxic respiratory failure, Cryptogenic organizing PNA.

## 2019-03-18 NOTE — PROGRESS NOTE ADULT - ASSESSMENT
62 yo presenting to ED w/ SOB 2/2 diastolic HF exacerbation and noted to have KEITH and hyperkalemia, PMH CAD s/p CABG and PCI, PAD s/p baloon angioplasty, DM2, HTN, HLD, hx of cardiac arrest, recent GI bleed last month, Diastolic CHF, legally blind, pleural effusion requiring thoracentesis in Dec 2018, admitted for SOB, noted to have pleural effusion, started on bumex, s/p RRT noted on 3/1 for hypotension & lethargy. Pt became hypotensive after Bumex with albumin was given.  CXR with worsening plum edema b/l. BiPAP placed. ICU consult called for vasopressor support during IV diuresis. Midodrine ordered. She became increasingly lethargic with ABG showing resp acidosis with hypoxia. Pulmonary consult appreciated    course complicated by hemoptysis    course complicated by recurrent hf exac.     A/p    > Acute on Chronic Diastolic Heart Failure with severe PHTN  & right ventricular failure from OHS/ KYREE complicated by pulm edema & pleural effusions  -prior exac resolved, now with recurrence,  sp chest tube w/ drainage, removed by CT sx on 3/12, uncomplicated  - held torsemide, resume lasix 60mg iv bid, strict i/o, continued with lasix IV, will give additional 40mg of Lasix, c/w coreg & Imdur, losartan, ECHO shows EF of > 75%. Moderate to Severe TR, mod-severe PHTN, Cardio on board, can consider IR follow up to assess for pleurex catheter placement, CXR repeated looks like worsening of effusion, she require 100% oxygen, spoke with CT surgery to see if she needs tube again, as well as tried to call the family all the number no one picked up, also discuss with palliative care team    > Hemoptysis:   per pt present for several days, no recurrence noted on 3/13   ? related to superficial lac in trachea from use of around the clock o2  h/h stable, resume asa + dvt ppx for now  serial h/h  repeat ct chest reviewed, no acute findings that require intervention noted  pulm fu appreciated, no more episode.     > Acute on Chronic Hypercapnic Respiratory Failure due to OHS/KYREE.   stable, Pulm recommend chronic NIV for chronic hypercarbic respiratory failure to improve symptoms and to decrease hospitalizations  c/w Bipap, she require all the time, Eventual SINGH with NIV, c/w steroid taper, last day 3/16  Avoid Narcotics for pain.    > RT PTX  - small lesion, noted on CT  - sp chest tube placement + removal  - resolved    >UTI - appreciate ID input, sp completed course of invanz, resolved    >Dysphagia - had a RR due to aspiration, speech eval appreciated - recommend regular diet with thin liquid.   aspiration precautions    >Chest pain with hx of CAD & CABG/PCI-  sp PRBC to improve oxygen to myocardium  c/w Imdur, BB, statin  ASA as above   Plavix on hold since last admission due to GI Bleed.    > Lower GI Bleed likely diverticular-   - s/p 1 unit of PRBC, well tolerated, uncomplicated  - s/p Colonoscopy - Poor prep throughout the colon. Diverticulosis seen. Unable to visualize colon sufficiently  No further GI w/u recommended,   cleared by GI to use asa but current use as above  Plavix yet on hold, outpt F/U    >Hyperkalemia  - resolved  - Holding Losartan, resume as needed for HF + bp control    > Acute on chronic kidney injury - resolved  appreciate renal input - - cont Torsemide ==> will need to tolerate azotemia  ct hold losartan   f/u BMP     > CAD complicated by stents in 2014 / PAD / HLD / HTN  - Continue, Imdur, Lipitor, Coreg and Norvasc   - Plavix on hold since last admission due to GI Bleed.     > DM 2 not on long term insulin complicated by asymptomatic hyperglycemia  - HbA1c 5.6, goal < 8, controlled  - Lantus at 10 units at bedtime plus Humalog 3U   - c/w moderate sliding scale    > Anemia  - Multifactorial (chronic, complicated with acute gi bleeding during admit)  - s/p Venofer   - Continue Procrit  + stool for occult blood (will need GI work-up as outpt)  - Monitor CBC  - per renal goal hgb 10.     > Transaminitis- resolved  - Likely secondary to liver congestion    > History of Depression  - chronic, stable, uncomplicated  - Continue Duloxetine 60 mg    >B/L UE swelling  - Arm elevation  - Warm compresses   - No DVT     >Obesity in setting of Acute Moderate Protein Calorie Malnutrition  bmi 36.1  Nepro TID  Neprovite and Vit C 500mg daily  daily wt.     hematuria: Urology consulted, patient has fontenot's cathter, will follow urology.     DVT Prophylaxis -- hold in light of hemoptysis, may resume depending on outcome of w/u    >Goals of care - palliative on board   DNR  prognosis guarded, case discussed w/ palliative, will see if as time progresses if pt would like further info regarding hospice.     >dispo. PT eval appreciated, transfer to [facility] when medically stable after w/u of hemoptysis completed. 64 yo presenting to ED w/ SOB 2/2 diastolic HF exacerbation and noted to have KEITH and hyperkalemia, PMH CAD s/p CABG and PCI, PAD s/p baloon angioplasty, DM2, HTN, HLD, hx of cardiac arrest, recent GI bleed last month, Diastolic CHF, legally blind, pleural effusion requiring thoracentesis in Dec 2018, admitted for SOB, noted to have pleural effusion, started on bumex, s/p RRT noted on 3/1 for hypotension & lethargy. Pt became hypotensive after Bumex with albumin was given.  CXR with worsening plum edema b/l. BiPAP placed. ICU consult called for vasopressor support during IV diuresis. Midodrine ordered. She became increasingly lethargic with ABG showing resp acidosis with hypoxia. Pulmonary team has been following, she was transferred in ICU as she was having worsening respiratory failure, in ICU she was given High dose steroids as there is possibility of cryptogenic Pneumonia and she was also continued with IV lasix, she improved and was weaned off BiPAP and transfered under Hospitalist service.         A/p    > Acute on Chronic Diastolic Heart Failure with severe PHTN  & right ventricular failure from OHS/ KYREE complicated by pulm edema & pleural effusions  -prior exac resolved, now with recurrence,  sp chest tube w/ drainage, removed by CT sx on 3/12, uncomplicated,  held torsemide, resume lasix 60mg iv bid, strict i/o, continued with lasix IV along with coreg & Imdur, losartan, ECHO shows EF of > 75%. Moderate to Severe TR, mod-severe PHTN, Cardio on board, can consider IR follow up to assess for pleurex catheter placement, CXR repeated looks like worsening of effusion, she required 100% oxygen, due to worsening respiratory failure, ICU was consulted and she was taken to the ICU she was continued initially with BiPAP and Lasix and they added high dose steriods, as there was ? cryptogenic organizing PNA, she improved and was weaned off BiPAP and transferred under Hospitalist service, currently she on solumedrole 40mg Q8 hours, will taper down slowly, will follow pulmonary recommendations      She appears to be doing better now that steroids have been restarted, which goes along with . Tolerating NC duringt he day. Tolerating PO diet. Continue lasix daily, steroids, OOB to chair. BIPAP nocturnally.           > Hemoptysis:   per pt present for several days, no recurrence noted on 3/13   ? related to superficial lac in trachea from use of around the clock o2  h/h stable, resume asa + dvt ppx for now  serial h/h  repeat ct chest reviewed, no acute findings that require intervention noted  pulm fu appreciated, no more episode.     > Acute on Chronic Hypercapnic Respiratory Failure due to OHS/KYREE.   stable, Pulm recommend chronic NIV for chronic hypercarbic respiratory failure to improve symptoms and to decrease hospitalizations  c/w Bipap as needed and nocturnal, sEventual SINGH with NIV, c/w steroid taper to PO.   Avoid Narcotics for pain.    > RT PTX  - small lesion, noted on CT  - sp chest tube placement + removal  - resolved    >UTI - appreciate ID input, sp completed course of invanz, resolved    >Dysphagia - had a RR due to aspiration, speech eval appreciated - recommend regular diet with thin liquid.   aspiration precautions    >Chest pain with hx of CAD & CABG/PCI-  sp PRBC to improve oxygen to myocardium  c/w Imdur, BB, statin  ASA as above   Plavix on hold since last admission due to GI Bleed.    > Lower GI Bleed likely diverticular-   - s/p 1 unit of PRBC, well tolerated, uncomplicated  - s/p Colonoscopy - Poor prep throughout the colon. Diverticulosis seen. Unable to visualize colon sufficiently  No further GI w/u recommended,   cleared by GI to use asa but current use as above  Plavix yet on hold, outpt F/U    >Hyperkalemia  - resolved  - Holding Losartan, resume as needed for HF + bp control    > Acute on chronic kidney injury - resolved  appreciate renal input - - cont Torsemide ==> will need to tolerate azotemia,   will resume losartan, repeat BMP in am.   f/u BMP     > CAD complicated by stents in 2014 / PAD / HLD / HTN  - Continue, Imdur, Lipitor, Coreg and Norvasc   - Plavix on hold since last admission due to GI Bleed.     > DM 2 not on long term insulin complicated by asymptomatic hyperglycemia  - HbA1c 5.6, goal < 8, her lantus was increase to 30units and on humlog 5 along with sliding scale, previous she was on   - Lantus at 10 units at bedtime plus Humalog 3U, it was increase due to high dose steriods.   - c/w moderate sliding scale    > Anemia  - Multifactorial (chronic, complicated with acute gi bleeding during admit)  - s/p Venofer   - Continue Procrit  + stool for occult blood (will need GI work-up as outpt)  - Monitor CBC  - per renal goal hgb 10.     > Transaminitis- resolved  - Likely secondary to liver congestion    > History of Depression  - chronic, stable, uncomplicated  - Continue Duloxetine 60 mg    >B/L UE swelling  - Arm elevation  - Warm compresses   - No DVT     >Obesity in setting of Acute Moderate Protein Calorie Malnutrition  bmi 36.1  Nepro TID  Neprovite and Vit C 500mg daily  daily wt.     hematuria: Urology consulted, patient has fontenot's cathter, will follow urology.     DVT Prophylaxis -- hold in light of hemoptysis, may resume depending on outcome of w/u    >Goals of care - palliative on board   DNR  prognosis guarded, case discussed w/ palliative    >dispo. PT eval appreciated, transfer to [facility] when medically stable after w/u of hemoptysis completed.

## 2019-03-18 NOTE — PROGRESS NOTE ADULT - SUBJECTIVE AND OBJECTIVE BOX
NEPHROLOGY INTERVAL HPI/OVERNIGHT EVENTS:    Feels better   moved out of ICU   Urology follow up noted   UO 2.7 L with Lasix     MEDICATIONS  (STANDING):  ALBUTerol    0.083% 10 milliGRAM(s) Nebulizer once  amLODIPine   Tablet 5 milliGRAM(s) Oral daily  ascorbic acid 500 milliGRAM(s) Oral daily  aspirin  chewable 81 milliGRAM(s) Oral daily  atorvastatin 80 milliGRAM(s) Oral at bedtime  carvedilol 6.25 milliGRAM(s) Oral every 12 hours  dextrose 5%. 1000 milliLiter(s) (50 mL/Hr) IV Continuous <Continuous>  dextrose 50% Injectable 12.5 Gram(s) IV Push once  dextrose 50% Injectable 25 Gram(s) IV Push once  dextrose 50% Injectable 25 Gram(s) IV Push once  docusate sodium 100 milliGRAM(s) Oral two times a day  DULoxetine 60 milliGRAM(s) Oral daily  enoxaparin Injectable 40 milliGRAM(s) SubCutaneous daily  epoetin sara Injectable 06593 Unit(s) SubCutaneous <User Schedule>  furosemide   Injectable 60 milliGRAM(s) IV Push every 12 hours  gabapentin 100 milliGRAM(s) Oral three times a day  insulin glargine Injectable (LANTUS) 30 Unit(s) SubCutaneous at bedtime  insulin lispro (HumaLOG) corrective regimen sliding scale   SubCutaneous Before meals and at bedtime  insulin lispro Injectable (HumaLOG) 5 Unit(s) SubCutaneous three times a day before meals  isosorbide   mononitrate ER Tablet (IMDUR) 60 milliGRAM(s) Oral daily  magnesium oxide 400 milliGRAM(s) Oral daily  methylPREDNISolone sodium succinate Injectable 40 milliGRAM(s) IV Push every 8 hours  Nephro-riya 1 Tablet(s) Oral daily  pantoprazole    Tablet 40 milliGRAM(s) Oral before breakfast  polyethylene glycol 3350 17 Gram(s) Oral daily  senna 2 Tablet(s) Oral at bedtime    MEDICATIONS  (PRN):  acetaminophen   Tablet .. 650 milliGRAM(s) Oral every 6 hours PRN Mild Pain (1 - 3)  ALBUTerol/ipratropium for Nebulization 3 milliLiter(s) Nebulizer every 6 hours PRN Shortness of Breath and/or Wheezing  dextrose 40% Gel 15 Gram(s) Oral once PRN Blood Glucose LESS THAN 70 milliGRAM(s)/deciliter  glucagon  Injectable 1 milliGRAM(s) IntraMuscular once PRN Glucose LESS THAN 70 milligrams/deciliter  glycopyrrolate Injectable 0.1 milliGRAM(s) IV Push every 8 hours PRN secreations  guaiFENesin    Syrup 100 milliGRAM(s) Oral every 6 hours PRN Cough  hydrALAZINE Injectable 10 milliGRAM(s) IV Push every 4 hours PRN sbp> 180  ondansetron Injectable 4 milliGRAM(s) IV Push every 6 hours PRN Nausea and/or Vomiting      Allergies    Accupril (Other)    Intolerances        Vital Signs Last 24 Hrs  T(C): 36.3 (18 Mar 2019 06:38), Max: 37 (17 Mar 2019 20:40)  T(F): 97.4 (18 Mar 2019 06:38), Max: 98.6 (17 Mar 2019 20:40)  HR: 63 (18 Mar 2019 12:12) (59 - 65)  BP: 132/55 (18 Mar 2019 12:12) (95/46 - 132/55)  BP(mean): 91 (17 Mar 2019 20:40) (67 - 91)  RR: 18 (18 Mar 2019 12:12) (14 - 22)  SpO2: 100% (18 Mar 2019 12:12) (93% - 100%)  Daily     Daily Weight in k.1 (18 Mar 2019 06:24)  I&O's Detail    17 Mar 2019 07:01  -  18 Mar 2019 07:00  --------------------------------------------------------  IN:    Oral Fluid: 420 mL  Total IN: 420 mL    OUT:    Indwelling Catheter - Urethral: 2700 mL  Total OUT: 2700 mL    Total NET: -2280 mL      18 Mar 2019 07:01  -  18 Mar 2019 14:24  --------------------------------------------------------  IN:    Oral Fluid: 120 mL  Total IN: 120 mL    OUT:  Total OUT: 0 mL    Total NET: 120 mL        I&O's Summary    17 Mar 2019 07:  -  18 Mar 2019 07:00  --------------------------------------------------------  IN: 420 mL / OUT: 2700 mL / NET: -2280 mL    18 Mar 2019 07:  -  18 Mar 2019 14:24  --------------------------------------------------------  IN: 120 mL / OUT: 0 mL / NET: 120 mL        PHYSICAL EXAM:  Off Bipap now   Cardiovascular: Normal S1 S2, No rub  Respiratory: Clear lungs with decreased BS at bases	  Gastrointestinal:  Soft, Non-tender, + BS	  Extremities: + LE edema  Still with fontenot , clearing     LABS:                        8.8    5.8   )-----------( 148      ( 18 Mar 2019 06:52 )             26.5     18    150<H>  |  102  |  73.0<H>  ----------------------------<  143<H>  4.0   |  38.0<H>  |  1.06    Ca    9.0      18 Mar 2019 06:52  Phos  3.3       Mg     2.5               Magnesium, Serum: 2.5 mg/dL ( @ 06:52)  Phosphorus Level, Serum: 3.3 mg/dL ( @ 06:52)          RADIOLOGY & ADDITIONAL TESTS:

## 2019-03-18 NOTE — PROGRESS NOTE ADULT - SUBJECTIVE AND OBJECTIVE BOX
Subjective: 63yFemale with urinary retention, hematuria, diastolic CHF.  Pt downgraded from ICU yesterday.  PT resting comfortable in bad, on nasal cannula, fontenot cath in place. No c/o at this time.      Fontenot: urine with some sediment, no hematuria, concentrated    Vital Signs Last 24 Hrs  T(C): 36.3 (18 Mar 2019 06:38), Max: 37 (17 Mar 2019 20:40)  T(F): 97.4 (18 Mar 2019 06:38), Max: 98.6 (17 Mar 2019 20:40)  HR: 59 (18 Mar 2019 06:38) (59 - 67)  BP: 121/54 (18 Mar 2019 06:38) (95/46 - 143/61)  BP(mean): 91 (17 Mar 2019 20:40) (67 - 91)  RR: 18 (18 Mar 2019 06:38) (14 - 24)  SpO2: 100% (18 Mar 2019 06:38) (93% - 100%)  I&O's Detail    17 Mar 2019 07:01  -  18 Mar 2019 07:00  --------------------------------------------------------  IN:    Oral Fluid: 420 mL  Total IN: 420 mL    OUT:    Indwelling Catheter - Urethral: 2700 mL  Total OUT: 2700 mL    Total NET: -2280 mL          Labs:                        8.8    5.8   )-----------( 148      ( 18 Mar 2019 06:52 )             26.5     03-18    150<H>  |  102  |  73.0<H>  ----------------------------<  143<H>  4.0   |  38.0<H>  |  1.06    Ca    9.0      18 Mar 2019 06:52  Phos  3.3     03-18  Mg     2.5     03-18        Culture - Urine (collected 15 Mar 2019 17:55)  Source: .Urine  Final Report (16 Mar 2019 16:31):    50,000 - 99,000 CFU/mL Candida albicans

## 2019-03-18 NOTE — PROGRESS NOTE ADULT - ASSESSMENT
Improved KEITH on CKD stage III in setting of decompensated CHF (R > L)  Elevated BUN also due to steroids  1.1g proteinuria  cont Lasix  ==> will need to tolerate azotemia    Anemia: +CKD +GIB  s/p IV Fe==> Tsat=27%  Continue Epogen

## 2019-03-18 NOTE — PROGRESS NOTE ADULT - ASSESSMENT
62 yo female with multiple medical issues, with urinary retention and hematuria.  Hematuria resolving.  - cont fontenot cath for UO monitoring.  - OOB to chair as per medicine/cardiology

## 2019-03-18 NOTE — PROGRESS NOTE ADULT - SUBJECTIVE AND OBJECTIVE BOX
KUN ROCHA    [id number]    63y      Female    Patient is a 63y old  Female who presents with a chief complaint of SOB (18 Mar 2019 17:30)      INTERVAL HPI/OVERNIGHT EVENTS:      Patient is still SOB looks like maxed out with her BiPAP setting, CXR shows worsening or effusion, denies fever, chills, chest pain, nausea, vomiting, dizziness     REVIEW OF SYSTEMS:    CONSTITUTIONAL: No fever, some fatigue  RESPIRATORY: No cough, has shortness of breath  CARDIOVASCULAR: No chest pain, palpitations  GASTROINTESTINAL: No abdominal, No nausea, vomiting  NEUROLOGICAL: No headaches,  loss of strength.  MISCELLANEOUS: No joint swelling or pain       Vital Signs Last 24 Hrs  T(C): 36.4 (18 Mar 2019 17:50), Max: 37 (17 Mar 2019 20:40)  T(F): 97.6 (18 Mar 2019 17:50), Max: 98.6 (17 Mar 2019 20:40)  HR: 77 (18 Mar 2019 17:50) (59 - 77)  BP: 126/70 (18 Mar 2019 17:50) (118/62 - 143/70)  BP(mean): 91 (17 Mar 2019 20:40) (84 - 91)  RR: 19 (18 Mar 2019 17:50) (16 - 20)  SpO2: 92% (18 Mar 2019 17:50) (92% - 100%)    PHYSICAL EXAM:    GENERAL: Middle age male looking comfortable   HEENT: Legally blind, has bipap mass on the face  NECK: soft, Supple, No JVD,   CHEST/LUNG: decrease air entry bilaterally; No wheezing  HEART: S1S2+, Regular rate and rhythm; No murmurs  ABDOMEN: Soft, Nontender, Nondistended; Bowel sounds present  EXTREMITIES:  1+ Peripheral Pulses, has edema  SKIN: No rashes or lesions  NEURO: AAOX3, no focal deficits, no motor r sensory loss  PSYCH: normal mood      LABS:                        8.8    5.8   )-----------( 148      ( 18 Mar 2019 06:52 )             26.5     03-18    150<H>  |  102  |  73.0<H>  ----------------------------<  143<H>  4.0   |  38.0<H>  |  1.06    Ca    9.0      18 Mar 2019 06:52  Phos  3.3     03-18  Mg     2.5     03-18              I&O's Summary    17 Mar 2019 07:01  -  18 Mar 2019 07:00  --------------------------------------------------------  IN: 420 mL / OUT: 2700 mL / NET: -2280 mL    18 Mar 2019 07:01  -  18 Mar 2019 19:47  --------------------------------------------------------  IN: 240 mL / OUT: 1400 mL / NET: -1160 mL        MEDICATIONS  (STANDING):  ALBUTerol    0.083% 10 milliGRAM(s) Nebulizer once  amLODIPine   Tablet 5 milliGRAM(s) Oral daily  ascorbic acid 500 milliGRAM(s) Oral daily  aspirin  chewable 81 milliGRAM(s) Oral daily  atorvastatin 80 milliGRAM(s) Oral at bedtime  carvedilol 6.25 milliGRAM(s) Oral every 12 hours  dextrose 5%. 1000 milliLiter(s) (50 mL/Hr) IV Continuous <Continuous>  dextrose 50% Injectable 12.5 Gram(s) IV Push once  dextrose 50% Injectable 25 Gram(s) IV Push once  dextrose 50% Injectable 25 Gram(s) IV Push once  docusate sodium 100 milliGRAM(s) Oral two times a day  DULoxetine 60 milliGRAM(s) Oral daily  enoxaparin Injectable 40 milliGRAM(s) SubCutaneous daily  epoetin sara Injectable 04416 Unit(s) SubCutaneous <User Schedule>  furosemide   Injectable 60 milliGRAM(s) IV Push every 12 hours  gabapentin 100 milliGRAM(s) Oral three times a day  insulin glargine Injectable (LANTUS) 30 Unit(s) SubCutaneous at bedtime  insulin lispro (HumaLOG) corrective regimen sliding scale   SubCutaneous Before meals and at bedtime  insulin lispro Injectable (HumaLOG) 5 Unit(s) SubCutaneous three times a day before meals  isosorbide   mononitrate ER Tablet (IMDUR) 60 milliGRAM(s) Oral daily  magnesium oxide 400 milliGRAM(s) Oral daily  methylPREDNISolone sodium succinate Injectable 40 milliGRAM(s) IV Push every 8 hours  Nephro-riya 1 Tablet(s) Oral daily  pantoprazole    Tablet 40 milliGRAM(s) Oral before breakfast  polyethylene glycol 3350 17 Gram(s) Oral daily  senna 2 Tablet(s) Oral at bedtime    MEDICATIONS  (PRN):  acetaminophen   Tablet .. 650 milliGRAM(s) Oral every 6 hours PRN Mild Pain (1 - 3)  ALBUTerol/ipratropium for Nebulization 3 milliLiter(s) Nebulizer every 6 hours PRN Shortness of Breath and/or Wheezing  dextrose 40% Gel 15 Gram(s) Oral once PRN Blood Glucose LESS THAN 70 milliGRAM(s)/deciliter  glucagon  Injectable 1 milliGRAM(s) IntraMuscular once PRN Glucose LESS THAN 70 milligrams/deciliter  glycopyrrolate Injectable 0.1 milliGRAM(s) IV Push every 8 hours PRN secreations  guaiFENesin    Syrup 100 milliGRAM(s) Oral every 6 hours PRN Cough  hydrALAZINE Injectable 10 milliGRAM(s) IV Push every 4 hours PRN sbp> 180  ondansetron Injectable 4 milliGRAM(s) IV Push every 6 hours PRN Nausea and/or Vomiting KUN ROCHA    [id number]    63y      Female    Patient is a 63y old  Female who presents with a chief complaint of SOB (18 Mar 2019 17:30)      INTERVAL HPI/OVERNIGHT EVENTS:      Patient is feeling much better off BIPAP on nasal cannula, she feel SOB has improved, she denies any fever, chills, hest pain, nausea, vomiting, dizziness    REVIEW OF SYSTEMS:    CONSTITUTIONAL: No fever, some fatigue  RESPIRATORY: No cough, improving shortness of breath  CARDIOVASCULAR: No chest pain, palpitations  GASTROINTESTINAL: No abdominal, No nausea, vomiting  NEUROLOGICAL: No headaches,  loss of strength.  MISCELLANEOUS: No joint swelling or pain       Vital Signs Last 24 Hrs  T(C): 36.4 (18 Mar 2019 17:50), Max: 37 (17 Mar 2019 20:40)  T(F): 97.6 (18 Mar 2019 17:50), Max: 98.6 (17 Mar 2019 20:40)  HR: 77 (18 Mar 2019 17:50) (59 - 77)  BP: 126/70 (18 Mar 2019 17:50) (118/62 - 143/70)  BP(mean): 91 (17 Mar 2019 20:40) (84 - 91)  RR: 19 (18 Mar 2019 17:50) (16 - 20)  SpO2: 92% (18 Mar 2019 17:50) (92% - 100%)    PHYSICAL EXAM:    GENERAL: Middle age male looking comfortable   HEENT: Legally blind, nasal cannula on  NECK: soft, Supple, No JVD,   CHEST/LUNG: decrease air entry bilaterally; No wheezing  HEART: S1S2+, Regular rate and rhythm; No murmurs  ABDOMEN: Soft, Nontender, Nondistended; Bowel sounds present  EXTREMITIES:  1+ Peripheral Pulses, has edema  SKIN: No rashes or lesions  NEURO: AAOX3, no focal deficits, no motor r sensory loss  PSYCH: normal mood      LABS:                        8.8    5.8   )-----------( 148      ( 18 Mar 2019 06:52 )             26.5     03-18    150<H>  |  102  |  73.0<H>  ----------------------------<  143<H>  4.0   |  38.0<H>  |  1.06    Ca    9.0      18 Mar 2019 06:52  Phos  3.3     03-18  Mg     2.5     03-18              I&O's Summary    17 Mar 2019 07:01  -  18 Mar 2019 07:00  --------------------------------------------------------  IN: 420 mL / OUT: 2700 mL / NET: -2280 mL    18 Mar 2019 07:01  -  18 Mar 2019 19:47  --------------------------------------------------------  IN: 240 mL / OUT: 1400 mL / NET: -1160 mL        MEDICATIONS  (STANDING):  ALBUTerol    0.083% 10 milliGRAM(s) Nebulizer once  amLODIPine   Tablet 5 milliGRAM(s) Oral daily  ascorbic acid 500 milliGRAM(s) Oral daily  aspirin  chewable 81 milliGRAM(s) Oral daily  atorvastatin 80 milliGRAM(s) Oral at bedtime  carvedilol 6.25 milliGRAM(s) Oral every 12 hours  dextrose 5%. 1000 milliLiter(s) (50 mL/Hr) IV Continuous <Continuous>  dextrose 50% Injectable 12.5 Gram(s) IV Push once  dextrose 50% Injectable 25 Gram(s) IV Push once  dextrose 50% Injectable 25 Gram(s) IV Push once  docusate sodium 100 milliGRAM(s) Oral two times a day  DULoxetine 60 milliGRAM(s) Oral daily  enoxaparin Injectable 40 milliGRAM(s) SubCutaneous daily  epoetin sara Injectable 47581 Unit(s) SubCutaneous <User Schedule>  furosemide   Injectable 60 milliGRAM(s) IV Push every 12 hours  gabapentin 100 milliGRAM(s) Oral three times a day  insulin glargine Injectable (LANTUS) 30 Unit(s) SubCutaneous at bedtime  insulin lispro (HumaLOG) corrective regimen sliding scale   SubCutaneous Before meals and at bedtime  insulin lispro Injectable (HumaLOG) 5 Unit(s) SubCutaneous three times a day before meals  isosorbide   mononitrate ER Tablet (IMDUR) 60 milliGRAM(s) Oral daily  magnesium oxide 400 milliGRAM(s) Oral daily  methylPREDNISolone sodium succinate Injectable 40 milliGRAM(s) IV Push every 8 hours  Nephro-riya 1 Tablet(s) Oral daily  pantoprazole    Tablet 40 milliGRAM(s) Oral before breakfast  polyethylene glycol 3350 17 Gram(s) Oral daily  senna 2 Tablet(s) Oral at bedtime    MEDICATIONS  (PRN):  acetaminophen   Tablet .. 650 milliGRAM(s) Oral every 6 hours PRN Mild Pain (1 - 3)  ALBUTerol/ipratropium for Nebulization 3 milliLiter(s) Nebulizer every 6 hours PRN Shortness of Breath and/or Wheezing  dextrose 40% Gel 15 Gram(s) Oral once PRN Blood Glucose LESS THAN 70 milliGRAM(s)/deciliter  glucagon  Injectable 1 milliGRAM(s) IntraMuscular once PRN Glucose LESS THAN 70 milligrams/deciliter  glycopyrrolate Injectable 0.1 milliGRAM(s) IV Push every 8 hours PRN secreations  guaiFENesin    Syrup 100 milliGRAM(s) Oral every 6 hours PRN Cough  hydrALAZINE Injectable 10 milliGRAM(s) IV Push every 4 hours PRN sbp> 180  ondansetron Injectable 4 milliGRAM(s) IV Push every 6 hours PRN Nausea and/or Vomiting

## 2019-03-19 LAB
ANION GAP SERPL CALC-SCNC: 11 MMOL/L — SIGNIFICANT CHANGE UP (ref 5–17)
BUN SERPL-MCNC: 70 MG/DL — HIGH (ref 8–20)
CALCIUM SERPL-MCNC: 8.6 MG/DL — SIGNIFICANT CHANGE UP (ref 8.6–10.2)
CHLORIDE SERPL-SCNC: 100 MMOL/L — SIGNIFICANT CHANGE UP (ref 98–107)
CO2 SERPL-SCNC: 35 MMOL/L — HIGH (ref 22–29)
CREAT SERPL-MCNC: 1.17 MG/DL — SIGNIFICANT CHANGE UP (ref 0.5–1.3)
GLUCOSE BLDC GLUCOMTR-MCNC: 144 MG/DL — HIGH (ref 70–99)
GLUCOSE BLDC GLUCOMTR-MCNC: 282 MG/DL — HIGH (ref 70–99)
GLUCOSE BLDC GLUCOMTR-MCNC: 358 MG/DL — HIGH (ref 70–99)
GLUCOSE BLDC GLUCOMTR-MCNC: 362 MG/DL — HIGH (ref 70–99)
GLUCOSE SERPL-MCNC: 182 MG/DL — HIGH (ref 70–115)
HCT VFR BLD CALC: 26.8 % — LOW (ref 37–47)
HGB BLD-MCNC: 8.9 G/DL — LOW (ref 12–16)
INR BLD: 1.22 RATIO — HIGH (ref 0.88–1.16)
MAGNESIUM SERPL-MCNC: 2.5 MG/DL — SIGNIFICANT CHANGE UP (ref 1.6–2.6)
MCHC RBC-ENTMCNC: 29.7 PG — SIGNIFICANT CHANGE UP (ref 27–31)
MCHC RBC-ENTMCNC: 33.2 G/DL — SIGNIFICANT CHANGE UP (ref 32–36)
MCV RBC AUTO: 89.3 FL — SIGNIFICANT CHANGE UP (ref 81–99)
PHOSPHATE SERPL-MCNC: 3.1 MG/DL — SIGNIFICANT CHANGE UP (ref 2.4–4.7)
PLATELET # BLD AUTO: 142 K/UL — LOW (ref 150–400)
POTASSIUM SERPL-MCNC: 4.4 MMOL/L — SIGNIFICANT CHANGE UP (ref 3.5–5.3)
POTASSIUM SERPL-SCNC: 4.4 MMOL/L — SIGNIFICANT CHANGE UP (ref 3.5–5.3)
PROTHROM AB SERPL-ACNC: 14.1 SEC — HIGH (ref 10–12.9)
RBC # BLD: 3 M/UL — LOW (ref 4.4–5.2)
RBC # FLD: 17.7 % — HIGH (ref 11–15.6)
SODIUM SERPL-SCNC: 146 MMOL/L — HIGH (ref 135–145)
WBC # BLD: 5.4 K/UL — SIGNIFICANT CHANGE UP (ref 4.8–10.8)
WBC # FLD AUTO: 5.4 K/UL — SIGNIFICANT CHANGE UP (ref 4.8–10.8)

## 2019-03-19 PROCEDURE — 99233 SBSQ HOSP IP/OBS HIGH 50: CPT

## 2019-03-19 PROCEDURE — 71045 X-RAY EXAM CHEST 1 VIEW: CPT | Mod: 26

## 2019-03-19 PROCEDURE — 99232 SBSQ HOSP IP/OBS MODERATE 35: CPT

## 2019-03-19 RX ORDER — TAMSULOSIN HYDROCHLORIDE 0.4 MG/1
0.4 CAPSULE ORAL AT BEDTIME
Qty: 0 | Refills: 0 | Status: DISCONTINUED | OUTPATIENT
Start: 2019-03-19 | End: 2019-03-26

## 2019-03-19 RX ADMIN — GABAPENTIN 100 MILLIGRAM(S): 400 CAPSULE ORAL at 21:15

## 2019-03-19 RX ADMIN — Medication 60 MILLIGRAM(S): at 06:21

## 2019-03-19 RX ADMIN — MAGNESIUM OXIDE 400 MG ORAL TABLET 400 MILLIGRAM(S): 241.3 TABLET ORAL at 08:57

## 2019-03-19 RX ADMIN — GABAPENTIN 100 MILLIGRAM(S): 400 CAPSULE ORAL at 06:22

## 2019-03-19 RX ADMIN — Medication 60 MILLIGRAM(S): at 17:34

## 2019-03-19 RX ADMIN — DULOXETINE HYDROCHLORIDE 60 MILLIGRAM(S): 30 CAPSULE, DELAYED RELEASE ORAL at 08:58

## 2019-03-19 RX ADMIN — ISOSORBIDE MONONITRATE 60 MILLIGRAM(S): 60 TABLET, EXTENDED RELEASE ORAL at 08:59

## 2019-03-19 RX ADMIN — Medication 40 MILLIGRAM(S): at 13:41

## 2019-03-19 RX ADMIN — Medication 5 UNIT(S): at 12:47

## 2019-03-19 RX ADMIN — Medication 5 UNIT(S): at 17:33

## 2019-03-19 RX ADMIN — Medication 100 MILLIGRAM(S): at 17:34

## 2019-03-19 RX ADMIN — Medication 40 MILLIGRAM(S): at 06:22

## 2019-03-19 RX ADMIN — Medication 6: at 12:47

## 2019-03-19 RX ADMIN — GABAPENTIN 100 MILLIGRAM(S): 400 CAPSULE ORAL at 13:41

## 2019-03-19 RX ADMIN — Medication 100 MILLIGRAM(S): at 06:22

## 2019-03-19 RX ADMIN — CARVEDILOL PHOSPHATE 6.25 MILLIGRAM(S): 80 CAPSULE, EXTENDED RELEASE ORAL at 17:34

## 2019-03-19 RX ADMIN — Medication 10: at 21:16

## 2019-03-19 RX ADMIN — ENOXAPARIN SODIUM 40 MILLIGRAM(S): 100 INJECTION SUBCUTANEOUS at 21:16

## 2019-03-19 RX ADMIN — Medication 81 MILLIGRAM(S): at 08:57

## 2019-03-19 RX ADMIN — Medication 10: at 17:33

## 2019-03-19 RX ADMIN — PANTOPRAZOLE SODIUM 40 MILLIGRAM(S): 20 TABLET, DELAYED RELEASE ORAL at 06:21

## 2019-03-19 RX ADMIN — Medication 5 UNIT(S): at 08:58

## 2019-03-19 RX ADMIN — INSULIN GLARGINE 30 UNIT(S): 100 INJECTION, SOLUTION SUBCUTANEOUS at 21:50

## 2019-03-19 RX ADMIN — Medication 500 MILLIGRAM(S): at 08:57

## 2019-03-19 RX ADMIN — AMLODIPINE BESYLATE 5 MILLIGRAM(S): 2.5 TABLET ORAL at 06:21

## 2019-03-19 RX ADMIN — SENNA PLUS 2 TABLET(S): 8.6 TABLET ORAL at 21:15

## 2019-03-19 RX ADMIN — Medication 1 TABLET(S): at 08:59

## 2019-03-19 RX ADMIN — ATORVASTATIN CALCIUM 80 MILLIGRAM(S): 80 TABLET, FILM COATED ORAL at 21:15

## 2019-03-19 RX ADMIN — TAMSULOSIN HYDROCHLORIDE 0.4 MILLIGRAM(S): 0.4 CAPSULE ORAL at 21:15

## 2019-03-19 RX ADMIN — CARVEDILOL PHOSPHATE 6.25 MILLIGRAM(S): 80 CAPSULE, EXTENDED RELEASE ORAL at 06:21

## 2019-03-19 NOTE — PROGRESS NOTE ADULT - SUBJECTIVE AND OBJECTIVE BOX
CC:  follow up GOC  INTERVAL HPI/OVERNIGHT EVENTS:    PRESENT SYMPTOMS: SOURCE:  Patient/Family/Team    PAIN SCALE:  0 = none  1 = mild   2 = moderate  3 = severe    Pain: denies    Dyspnea:  [ ] YES [x ] NO  Anxiety:  [ ] YES [x ] NO  Fatigue: [x ] YES [ ] NO  Nausea: [ ] YES [x ] NO  Loss of Appetite: x[x ] YES [ ] NO  Other symptoms: __________    MEDICATIONS  (STANDING):  ALBUTerol    0.083% 10 milliGRAM(s) Nebulizer once  amLODIPine   Tablet 5 milliGRAM(s) Oral daily  ascorbic acid 500 milliGRAM(s) Oral daily  aspirin  chewable 81 milliGRAM(s) Oral daily  atorvastatin 80 milliGRAM(s) Oral at bedtime  carvedilol 6.25 milliGRAM(s) Oral every 12 hours  dextrose 5%. 1000 milliLiter(s) (50 mL/Hr) IV Continuous <Continuous>  dextrose 50% Injectable 12.5 Gram(s) IV Push once  dextrose 50% Injectable 25 Gram(s) IV Push once  dextrose 50% Injectable 25 Gram(s) IV Push once  docusate sodium 100 milliGRAM(s) Oral two times a day  DULoxetine 60 milliGRAM(s) Oral daily  enoxaparin Injectable 40 milliGRAM(s) SubCutaneous daily  epoetin sara Injectable 46071 Unit(s) SubCutaneous <User Schedule>  furosemide   Injectable 60 milliGRAM(s) IV Push every 12 hours  gabapentin 100 milliGRAM(s) Oral three times a day  insulin glargine Injectable (LANTUS) 30 Unit(s) SubCutaneous at bedtime  insulin lispro (HumaLOG) corrective regimen sliding scale   SubCutaneous Before meals and at bedtime  insulin lispro Injectable (HumaLOG) 5 Unit(s) SubCutaneous three times a day before meals  isosorbide   mononitrate ER Tablet (IMDUR) 60 milliGRAM(s) Oral daily  magnesium oxide 400 milliGRAM(s) Oral daily  methylPREDNISolone sodium succinate Injectable 40 milliGRAM(s) IV Push every 8 hours  Nephro-riya 1 Tablet(s) Oral daily  pantoprazole    Tablet 40 milliGRAM(s) Oral before breakfast  polyethylene glycol 3350 17 Gram(s) Oral daily  senna 2 Tablet(s) Oral at bedtime    MEDICATIONS  (PRN):  acetaminophen   Tablet .. 650 milliGRAM(s) Oral every 6 hours PRN Mild Pain (1 - 3)  ALBUTerol/ipratropium for Nebulization 3 milliLiter(s) Nebulizer every 6 hours PRN Shortness of Breath and/or Wheezing  dextrose 40% Gel 15 Gram(s) Oral once PRN Blood Glucose LESS THAN 70 milliGRAM(s)/deciliter  glucagon  Injectable 1 milliGRAM(s) IntraMuscular once PRN Glucose LESS THAN 70 milligrams/deciliter  glycopyrrolate Injectable 0.1 milliGRAM(s) IV Push every 8 hours PRN secreations  guaiFENesin    Syrup 100 milliGRAM(s) Oral every 6 hours PRN Cough  hydrALAZINE Injectable 10 milliGRAM(s) IV Push every 4 hours PRN sbp> 180  ondansetron Injectable 4 milliGRAM(s) IV Push every 6 hours PRN Nausea and/or Vomiting      Allergies    Accupril (Other)    Intolerances    Karnofsky Performance Score/Palliative Performance Status Version 2:  40  %    Vital Signs Last 24 Hrs  T(C): 36.7 (19 Mar 2019 09:50), Max: 36.7 (19 Mar 2019 09:50)  T(F): 98 (19 Mar 2019 09:50), Max: 98 (19 Mar 2019 09:50)  HR: 63 (19 Mar 2019 09:50) (62 - 77)  BP: 122/57 (19 Mar 2019 09:50) (110/54 - 143/70)  BP(mean): --  RR: 16 (19 Mar 2019 09:50) (16 - 19)  SpO2: 96% (19 Mar 2019 09:50) (92% - 97%)    PHYSICAL EXAM:    General: AOx3 NAD    HEENT: [ x] normal  [ ] dry mouth  [ ] ET tube/trach    Lungs: [x ] comfortable + nasal canula  CV: [x ] normal  [ ] tachycardia    GI: [x ] normal  [ ] distended  [ ] tender  [ ] no BS               [ ] PEG/NG/OG tube    : [x ] normal  [ ] incontinent  [ ] oliguria/anuria  [ ] fontenot    MSK: [ ] normal  [x ] weakness  [ ] edema             [ ] ambulatory  [ x] chair bound    Skin: [ ] normal  [ ] pressure ulcers- Stage_____  [ x] no rash    LABS:                        8.9    5.4   )-----------( 142      ( 19 Mar 2019 06:15 )             26.8     03-19    146<H>  |  100  |  70.0<H>  ----------------------------<  182<H>  4.4   |  35.0<H>  |  1.17    Ca    8.6      19 Mar 2019 06:15  Phos  3.1     03-19  Mg     2.5     03-19      PT/INR - ( 19 Mar 2019 06:15 )   PT: 14.1 sec;   INR: 1.22 ratio             I&O's Summary    18 Mar 2019 07:01  -  19 Mar 2019 07:00  --------------------------------------------------------  IN: 360 mL / OUT: 2975 mL / NET: -2615 mL    19 Mar 2019 07:01  -  19 Mar 2019 14:03  --------------------------------------------------------  IN: 180 mL / OUT: 1200 mL / NET: -1020 mL        Thank you for the opportunity to assist with the care of this patient.   [facility] [phone].

## 2019-03-19 NOTE — PROGRESS NOTE ADULT - ASSESSMENT
64 yo female with multiple medical issues, recently treated for a UTI, off abx now, hematuria resolved, remains afebrile.  Pt is mobile to chair with assistance  - recommend TOV now that pt can get oob to chair  - PT oob to chair, ambulate, transfers  - discussed with pt that if she has urinary retention again, she will need the fontenot cath to be replaced.  - recommend bladder scanner 6hrs after fontenot d/c'd or after first void to check PVR's

## 2019-03-19 NOTE — PROGRESS NOTE ADULT - SUBJECTIVE AND OBJECTIVE BOX
Subjective: 63yFemale resting comfortable in chair, tolerating regular diet. Appears better today, alert.  Pt has no c/o SOB or CP, on nasal canula breathing comfortably.  Pt states she is able to get into chair with assistance and can take a few steps with a walker and assistance.    [place]: in place, urine yellow, small amt of sediment, no hematuria    Vital Signs Last 24 Hrs  T(C): 36.7 (19 Mar 2019 09:50), Max: 36.7 (19 Mar 2019 09:50)  T(F): 98 (19 Mar 2019 09:50), Max: 98 (19 Mar 2019 09:50)  HR: 63 (19 Mar 2019 09:50) (62 - 77)  BP: 122/57 (19 Mar 2019 09:50) (110/54 - 143/70)  BP(mean): --  RR: 16 (19 Mar 2019 09:50) (16 - 19)  SpO2: 96% (19 Mar 2019 09:50) (92% - 100%)  I&O's Detail    18 Mar 2019 07:01  -  19 Mar 2019 07:00  --------------------------------------------------------  IN:    Oral Fluid: 360 mL  Total IN: 360 mL    OUT:    Indwelling Catheter - Urethral: 2975 mL  Total OUT: 2975 mL    Total NET: -2615 mL          Labs:                        8.9    5.4   )-----------( 142      ( 19 Mar 2019 06:15 )             26.8     03-19    146<H>  |  100  |  70.0<H>  ----------------------------<  182<H>  4.4   |  35.0<H>  |  1.17    Ca    8.6      19 Mar 2019 06:15  Phos  3.1     03-19  Mg     2.5     03-19      PT/INR - ( 19 Mar 2019 06:15 )   PT: 14.1 sec;   INR: 1.22 ratio

## 2019-03-19 NOTE — PROGRESS NOTE ADULT - ASSESSMENT
Improved, cxr sig better with decreased eff and consolidation  continue bipap nocturnal ans prn  taper medrol- wean to prednisone with slow taper  gentle diuresis, follow lytes  prognosis guarded, now DNR  mobilize Improved, cxr sig better with decreased eff and consolidation  ? of  has been raised, also was on diuresis  classic  not associated with pleural eff  taper medrol- wean to prednisone with slow taper and re evaluate  gentle diuresis, follow lytes  continue nocturnal bipap  prognosis guarded, now DNR  mobilize Improved, cxr sig better with decreased eff and consolidation  ? of  has been raised, also was on diuresis  classic  not associated with pleural eff  taper medrol- wean to prednisone with slow taper and re evaluate  gentle diuresis, follow lytes  continue nocturnal bipap  prognosis guarded, now DNR  mobilize  repeat ABG prior to d/c for nocturnal non invasive at home

## 2019-03-19 NOTE — PROGRESS NOTE ADULT - NSICDXPROBLEM_GEN_ALL_CORE_FT
PROBLEM DIAGNOSES  Problem: Cryptogenic organizing pneumonia  Assessment and Plan: cont steroids    Problem: Acute hypoxemic respiratory failure  Assessment and Plan: Tolerating nasal canula  bipap PRN    Problem: Acute decompensated heart failure  Assessment and Plan: Cont cardiac meds    Problem: Functional quadriplegia  Assessment and Plan: Assist wtih care    Problem: Encounter for palliative care  Assessment and Plan: Patient clinically better with addition  of steroids.  Though still has underlying DHF, Pulm HTN.  Will cont to monitor hospital course  Family rescinded DNR/i      Problem: Acute on chronic diastolic (congestive) heart failure  Assessment and Plan: cont Lasix    Problem: Pleural effusion  Assessment and Plan: s/p CT - removed    Problem: Acute on chronic respiratory failure with hypoxia and hypercapnia  Assessment and Plan: On Bipap  MICU consult    Problem: Obesity hypoventilation syndrome  Assessment and Plan: O2 support    Problem: Acute respiratory failure with hypercapnia  Assessment and Plan: on continuous Bipap  ICU consult called PROBLEM DIAGNOSES  Problem: Cryptogenic organizing pneumonia  Assessment and Plan: cont steroids    Problem: Acute hypoxemic respiratory failure  Assessment and Plan: Tolerating nasal canula  bipap PRN    Problem: Acute decompensated heart failure  Assessment and Plan: Cont cardiac meds    Problem: Functional quadriplegia  Assessment and Plan: Assist wt care    Problem: Encounter for palliative care  Assessment and Plan: Patient clinically better with addition  of steroids.  Though still has underlying DHF, Pulm HTN.  Will cont to monitor hospital course  Family RESCINDED  DNR/I     Patient confirms WANTS CPR and Intubation.   Order has been DISCONTINUED in [place]      Problem: Acute on chronic diastolic (congestive) heart failure  Assessment and Plan: cont Lasix    Problem: Pleural effusion  Assessment and Plan: s/p CT - removed    Problem: Obesity hypoventilation syndrome  Assessment and Plan: O2 support

## 2019-03-19 NOTE — PROGRESS NOTE ADULT - ASSESSMENT
64 yo presenting to ED w/ SOB 2/2 diastolic HF exacerbation and noted to have KEITH and hyperkalemia, PMH CAD s/p CABG and PCI, PAD s/p baloon angioplasty, DM2, HTN, HLD, hx of cardiac arrest, recent GI bleed last month, Diastolic CHF, legally blind, pleural effusion requiring thoracentesis in Dec 2018, admitted for SOB, noted to have pleural effusion, started on bumex, s/p RRT noted on 3/1 for hypotension & lethargy. Pt became hypotensive after Bumex with albumin was given.  CXR with worsening plum edema b/l. BiPAP placed. ICU consult called for vasopressor support during IV diuresis. Midodrine ordered. She became increasingly lethargic with ABG showing resp acidosis with hypoxia. Pulmonary team has been following, she was transferred in ICU as she was having worsening respiratory failure, in ICU she was given High dose steroids as there is possibility of cryptogenic Pneumonia and she was also continued with IV lasix, she improved and was weaned off BiPAP and transfered under Hospitalist service.         A/p    > Acute on Chronic Diastolic Heart Failure with severe PHTN  & right ventricular failure from OHS/ KYREE complicated by pulm edema & pleural effusions  -prior exac resolved, now with recurrence,  sp chest tube w/ drainage, removed by CT sx on 3/12, uncomplicated,  held torsemide, resume lasix 60mg iv bid, strict i/o, continued with lasix IV along with coreg & Imdur, losartan, ECHO shows EF of > 75%. Moderate to Severe TR, mod-severe PHTN, Cardio on board, can consider IR follow up to assess for pleurex catheter placement, CXR repeated looks like worsening of effusion, she required 100% oxygen, due to worsening respiratory failure, ICU was consulted and she was taken to the ICU she was continued initially with BiPAP and Lasix and they added high dose steriods, as there was ? cryptogenic organizing PNA, she improved and was weaned off BiPAP and transferred under Hospitalist service, currently she on solumedrole 40mg Q8 hours, will taper down slowly, pulmonary is following, she appears to be doing better now that steroids have been restarted and is on being, repeat CXR portable is looking pretty much the same, will get PA lateral view of the chest and will get better view of the chest, tolerating NC during the day, Tolerating PO diet. Continue lasix daily, steroids, OOB to chair. BIPAP nocturnally.       > Hemoptysis:   per pt present for several days, no recurrence noted on 3/13, ? related to superficial lac in trachea from use of around the clock o2  h/h stable, resume asa + dvt ppx for now, serial h/h, repeat ct chest reviewed, no acute findings that require intervention noted  pulm fu appreciated, no more episode.     > Acute on Chronic Hypercapnic Respiratory Failure due to OHS/KYREE.   stable, Pulm recommend chronic NIV for chronic hypercarbic respiratory failure to improve symptoms and to decrease hospitalizations  c/w Bipap as needed and nocturnal, Eventual SINGH with NIV, c/w steroid taper to PO, avoid Narcotics for pain.    > RT PTX  - small lesion, noted on CT  - sp chest tube placement + removal  - resolved    >UTI - appreciate ID input, sp completed course of invanz, resolved    >Dysphagia - had a RR due to aspiration, speech eval appreciated - recommend regular diet with thin liquid, aspiration precautions    >Chest pain with hx of CAD & CABG/PCI-  sp PRBC to improve oxygen to myocardium  c/w Imdur, BB, statin  ASA as above   Plavix on hold since last admission due to GI Bleed.     > Lower GI Bleed likely diverticular:   - s/p 1 unit of PRBC, well tolerated, uncomplicated  - s/p Colonoscopy - Poor prep throughout the colon. Diverticulosis seen. Unable to visualize colon sufficiently  No further GI w/u recommended,   cleared by GI to use asa but current use as above  Plavix yet on hold, outpt F/U    >Hyperkalemia  - resolved  - Holding Losartan, resume as needed for HF + bp control    > Acute on chronic kidney injury - resolved  appreciate renal input - - cont Torsemide ==> will need to tolerate azotemia,   will resume losartan, repeat BMP in am.   f/u BMP     > CAD complicated by stents in 2014 / PAD / HLD / HTN  - Continue, Imdur, Lipitor, Coreg and Norvasc   - Plavix on hold since last admission due to GI Bleed.     > DM 2 not on long term insulin complicated by asymptomatic hyperglycemia  - HbA1c 5.6, goal < 8, her lantus was increase to 30units and on humlog 5 along with sliding scale, previous she was on   - Lantus at 10 units at bedtime plus Humalog 3U, it was increase due to high dose steriods.   - c/w moderate sliding scale    > Anemia  - Multifactorial (chronic, complicated with acute gi bleeding during admit)  - s/p Venofer   - Continue Procrit  + stool for occult blood (will need GI work-up as outpt)  - Monitor CBC  - per renal goal hgb 10.     > Transaminitis- resolved  - Likely secondary to liver congestion    > History of Depression  - chronic, stable, uncomplicated  - Continue Duloxetine 60 mg    >B/L UE swelling  - Arm elevation  - Warm compresses   - No DVT     >Obesity in setting of Acute Moderate Protein Calorie Malnutrition  bmi 36.1  Nepro TID  Neprovite and Vit C 500mg daily  daily wt.     hematuria: Urology consulted, patient has fontenot's cathter, hematuria resolved, voiding trial, will follow urology.     DVT Prophylaxis -- hold in light of hemoptysis, may resume depending on outcome of w/u    >Goals of care - palliative on board   DNR  prognosis guarded, case discussed w/ palliative    >dispo. PT darius appreciated, transfer to [facility] when medically stable after w/u of hemoptysis completed.

## 2019-03-19 NOTE — PROGRESS NOTE ADULT - SUBJECTIVE AND OBJECTIVE BOX
NEPHROLOGY INTERVAL HPI/OVERNIGHT EVENTS:    Seen earlier   Feels better   Urology follow up noted     MEDICATIONS  (STANDING):  ALBUTerol    0.083% 10 milliGRAM(s) Nebulizer once  amLODIPine   Tablet 5 milliGRAM(s) Oral daily  ascorbic acid 500 milliGRAM(s) Oral daily  aspirin  chewable 81 milliGRAM(s) Oral daily  atorvastatin 80 milliGRAM(s) Oral at bedtime  carvedilol 6.25 milliGRAM(s) Oral every 12 hours  dextrose 5%. 1000 milliLiter(s) (50 mL/Hr) IV Continuous <Continuous>  dextrose 50% Injectable 12.5 Gram(s) IV Push once  dextrose 50% Injectable 25 Gram(s) IV Push once  dextrose 50% Injectable 25 Gram(s) IV Push once  docusate sodium 100 milliGRAM(s) Oral two times a day  DULoxetine 60 milliGRAM(s) Oral daily  enoxaparin Injectable 40 milliGRAM(s) SubCutaneous daily  epoetin sara Injectable 51825 Unit(s) SubCutaneous <User Schedule>  furosemide   Injectable 60 milliGRAM(s) IV Push every 12 hours  gabapentin 100 milliGRAM(s) Oral three times a day  insulin glargine Injectable (LANTUS) 30 Unit(s) SubCutaneous at bedtime  insulin lispro (HumaLOG) corrective regimen sliding scale   SubCutaneous Before meals and at bedtime  insulin lispro Injectable (HumaLOG) 5 Unit(s) SubCutaneous three times a day before meals  isosorbide   mononitrate ER Tablet (IMDUR) 60 milliGRAM(s) Oral daily  magnesium oxide 400 milliGRAM(s) Oral daily  methylPREDNISolone sodium succinate Injectable 40 milliGRAM(s) IV Push every 8 hours  Nephro-riya 1 Tablet(s) Oral daily  pantoprazole    Tablet 40 milliGRAM(s) Oral before breakfast  polyethylene glycol 3350 17 Gram(s) Oral daily  senna 2 Tablet(s) Oral at bedtime    MEDICATIONS  (PRN):  acetaminophen   Tablet .. 650 milliGRAM(s) Oral every 6 hours PRN Mild Pain (1 - 3)  ALBUTerol/ipratropium for Nebulization 3 milliLiter(s) Nebulizer every 6 hours PRN Shortness of Breath and/or Wheezing  dextrose 40% Gel 15 Gram(s) Oral once PRN Blood Glucose LESS THAN 70 milliGRAM(s)/deciliter  glucagon  Injectable 1 milliGRAM(s) IntraMuscular once PRN Glucose LESS THAN 70 milligrams/deciliter  glycopyrrolate Injectable 0.1 milliGRAM(s) IV Push every 8 hours PRN secreations  guaiFENesin    Syrup 100 milliGRAM(s) Oral every 6 hours PRN Cough  hydrALAZINE Injectable 10 milliGRAM(s) IV Push every 4 hours PRN sbp> 180  ondansetron Injectable 4 milliGRAM(s) IV Push every 6 hours PRN Nausea and/or Vomiting      Allergies    Accupril (Other)    Intolerances          Vital Signs Last 24 Hrs  T(C): 36.7 (19 Mar 2019 09:50), Max: 36.7 (19 Mar 2019 09:50)  T(F): 98 (19 Mar 2019 09:50), Max: 98 (19 Mar 2019 09:50)  HR: 63 (19 Mar 2019 09:50) (62 - 77)  BP: 122/57 (19 Mar 2019 09:50) (110/54 - 143/70)  BP(mean): --  RR: 16 (19 Mar 2019 09:50) (16 - 19)  SpO2: 96% (19 Mar 2019 09:50) (92% - 100%)  Daily     Daily Weight in k.4 (19 Mar 2019 06:14)  I&O's Detail    18 Mar 2019 07:  -  19 Mar 2019 07:00  --------------------------------------------------------  IN:    Oral Fluid: 360 mL  Total IN: 360 mL    OUT:    Indwelling Catheter - Urethral: 2975 mL  Total OUT: 2975 mL    Total NET: -2615 mL      19 Mar 2019 07:  -  19 Mar 2019 11:41  --------------------------------------------------------  IN:    Oral Fluid: 180 mL  Total IN: 180 mL    OUT:  Total OUT: 0 mL    Total NET: 180 mL        I&O's Summary    18 Mar 2019 07:  -  19 Mar 2019 07:00  --------------------------------------------------------  IN: 360 mL / OUT: 2975 mL / NET: -2615 mL    19 Mar 2019 07:01  -  19 Mar 2019 11:41  --------------------------------------------------------  IN: 180 mL / OUT: 0 mL / NET: 180 mL        PHYSICAL EXAM:  Cardiovascular: Normal S1 S2, No rub  Respiratory: Clear lungs with decreased BS at bases	  Gastrointestinal:  Soft, Non-tender, + BS	  Extremities: + LE edema  Still with fontenot , clearing       LABS:                        8.9    5.4   )-----------( 142      ( 19 Mar 2019 06:15 )             26.8         146<H>  |  100  |  70.0<H>  ----------------------------<  182<H>  4.4   |  35.0<H>  |  1.17    Ca    8.6      19 Mar 2019 06:15  Phos  3.1       Mg     2.5           PT/INR - ( 19 Mar 2019 06:15 )   PT: 14.1 sec;   INR: 1.22 ratio             Magnesium, Serum: 2.5 mg/dL ( @ 06:15)  Phosphorus Level, Serum: 3.1 mg/dL ( @ 06:15)          RADIOLOGY & ADDITIONAL TESTS:

## 2019-03-19 NOTE — PROGRESS NOTE ADULT - ASSESSMENT
Improved KEITH on CKD stage III in setting of decompensated CHF (R > L)  Elevated BUN also due to steroids  1.1g proteinuria  cont Lasix  ==> will need to tolerate azotemia  Urology to do TOV today with monitoring of PVR     Anemia: +CKD +GIB  s/p IV Fe==> Tsat=27%  Continue Epogen

## 2019-03-19 NOTE — PROGRESS NOTE ADULT - SUBJECTIVE AND OBJECTIVE BOX
PULMONARY PROGRESS NOTE      RANJIT ROCHA-[id number]    Patient is a 63y old  Female who presents with a chief complaint of SOB (19 Mar 2019 14:55)      INTERVAL HPI/OVERNIGHT EVENTS:  feels sig better  no sob or chest pain  MEDICATIONS  (STANDING):  ALBUTerol    0.083% 10 milliGRAM(s) Nebulizer once  amLODIPine   Tablet 5 milliGRAM(s) Oral daily  ascorbic acid 500 milliGRAM(s) Oral daily  aspirin  chewable 81 milliGRAM(s) Oral daily  atorvastatin 80 milliGRAM(s) Oral at bedtime  carvedilol 6.25 milliGRAM(s) Oral every 12 hours  dextrose 5%. 1000 milliLiter(s) (50 mL/Hr) IV Continuous <Continuous>  dextrose 50% Injectable 12.5 Gram(s) IV Push once  dextrose 50% Injectable 25 Gram(s) IV Push once  dextrose 50% Injectable 25 Gram(s) IV Push once  docusate sodium 100 milliGRAM(s) Oral two times a day  DULoxetine 60 milliGRAM(s) Oral daily  enoxaparin Injectable 40 milliGRAM(s) SubCutaneous daily  epoetin sara Injectable 12044 Unit(s) SubCutaneous <User Schedule>  furosemide   Injectable 60 milliGRAM(s) IV Push every 12 hours  gabapentin 100 milliGRAM(s) Oral three times a day  insulin glargine Injectable (LANTUS) 30 Unit(s) SubCutaneous at bedtime  insulin lispro (HumaLOG) corrective regimen sliding scale   SubCutaneous Before meals and at bedtime  insulin lispro Injectable (HumaLOG) 5 Unit(s) SubCutaneous three times a day before meals  isosorbide   mononitrate ER Tablet (IMDUR) 60 milliGRAM(s) Oral daily  magnesium oxide 400 milliGRAM(s) Oral daily  methylPREDNISolone sodium succinate Injectable 40 milliGRAM(s) IV Push every 8 hours  Nephro-riya 1 Tablet(s) Oral daily  pantoprazole    Tablet 40 milliGRAM(s) Oral before breakfast  polyethylene glycol 3350 17 Gram(s) Oral daily  senna 2 Tablet(s) Oral at bedtime      MEDICATIONS  (PRN):  acetaminophen   Tablet .. 650 milliGRAM(s) Oral every 6 hours PRN Mild Pain (1 - 3)  ALBUTerol/ipratropium for Nebulization 3 milliLiter(s) Nebulizer every 6 hours PRN Shortness of Breath and/or Wheezing  dextrose 40% Gel 15 Gram(s) Oral once PRN Blood Glucose LESS THAN 70 milliGRAM(s)/deciliter  glucagon  Injectable 1 milliGRAM(s) IntraMuscular once PRN Glucose LESS THAN 70 milligrams/deciliter  glycopyrrolate Injectable 0.1 milliGRAM(s) IV Push every 8 hours PRN secreations  guaiFENesin    Syrup 100 milliGRAM(s) Oral every 6 hours PRN Cough  hydrALAZINE Injectable 10 milliGRAM(s) IV Push every 4 hours PRN sbp> 180  ondansetron Injectable 4 milliGRAM(s) IV Push every 6 hours PRN Nausea and/or Vomiting      Allergies    Accupril (Other)    Intolerances        PAST MEDICAL & SURGICAL HISTORY:  Herniated disc, cervical  PAD (peripheral artery disease)  Legally blind  History of peripheral vascular disease  History of retinal detachment  History of CEA (carotid endarterectomy): Right  Hyperlipidemia  Glaucoma  Hypertension  Diabetes  S/P CABG x 1  After cataract  Uveitic glaucoma of both eyes  Detached retina  Atherosclerosis of right carotid artery   delivery delivered      SOCIAL HISTORY  Smoking History:       REVIEW OF SYSTEMS:    CONSTITUTIONAL:  No distress    HEENT:  Eyes:  No diplopia or blurred vision. ENT:  No earache, sore throat or runny nose.    CARDIOVASCULAR:  No pressure, squeezing, tightness, heaviness or aching about the chest; no palpitations.    RESPIRATORY:  see hpi    GASTROINTESTINAL:  No nausea, vomiting or diarrhea.    GENITOURINARY:  No dysuria, frequency or urgency.    NEUROLOGIC:  No paresthesias, fasciculations, seizures or weakness.    PSYCHIATRIC:  No disorder of thought or mood.    Vital Signs Last 24 Hrs  T(C): 36.4 (19 Mar 2019 15:46), Max: 36.7 (19 Mar 2019 09:50)  T(F): 97.5 (19 Mar 2019 15:46), Max: 98 (19 Mar 2019 09:50)  HR: 68 (19 Mar 2019 15:46) (62 - 77)  BP: 129/62 (19 Mar 2019 15:46) (110/54 - 129/62)  BP(mean): --  RR: 16 (19 Mar 2019 15:46) (16 - 19)  SpO2: 95% (19 Mar 2019 15:46) (92% - 97%)    PHYSICAL EXAMINATION:    GENERAL: The patient is awake and alert in no apparent distress.     HEENT: Head is normocephalic and atraumatic. Extraocular muscles are intact. Mucous membranes are moist.    NECK: Supple.    LUNGS: moderate air entry bilaterally  without wheezing, rales or rhonchi; respirations unlabored    HEART: Regular rate and rhythm without murmur.    ABDOMEN: Soft, nontender, and nondistended.      EXTREMITIES: Without any cyanosis, clubbing, rash, lesions or edema.    NEUROLOGIC: Grossly intact.    LABS:                        8.9    5.4   )-----------( 142      ( 19 Mar 2019 06:15 )             26.8     -    146<H>  |  100  |  70.0<H>  ----------------------------<  182<H>  4.4   |  35.0<H>  |  1.17    Ca    8.6      19 Mar 2019 06:15  Phos  3.1     -  Mg     2.5     -      PT/INR - ( 19 Mar 2019 06:15 )   PT: 14.1 sec;   INR: 1.22 ratio                             MICROBIOLOGY:    RADIOLOGY & ADDITIONAL STUDIES:  cxr reviewed and compared

## 2019-03-20 LAB
ANION GAP SERPL CALC-SCNC: 11 MMOL/L — SIGNIFICANT CHANGE UP (ref 5–17)
BUN SERPL-MCNC: 69 MG/DL — HIGH (ref 8–20)
CALCIUM SERPL-MCNC: 8.4 MG/DL — LOW (ref 8.6–10.2)
CHLORIDE SERPL-SCNC: 102 MMOL/L — SIGNIFICANT CHANGE UP (ref 98–107)
CO2 SERPL-SCNC: 34 MMOL/L — HIGH (ref 22–29)
CREAT SERPL-MCNC: 1.03 MG/DL — SIGNIFICANT CHANGE UP (ref 0.5–1.3)
GLUCOSE BLDC GLUCOMTR-MCNC: 113 MG/DL — HIGH (ref 70–99)
GLUCOSE BLDC GLUCOMTR-MCNC: 155 MG/DL — HIGH (ref 70–99)
GLUCOSE BLDC GLUCOMTR-MCNC: 276 MG/DL — HIGH (ref 70–99)
GLUCOSE BLDC GLUCOMTR-MCNC: 76 MG/DL — SIGNIFICANT CHANGE UP (ref 70–99)
GLUCOSE SERPL-MCNC: 111 MG/DL — SIGNIFICANT CHANGE UP (ref 70–115)
HCT VFR BLD CALC: 24.8 % — LOW (ref 37–47)
HGB BLD-MCNC: 8.3 G/DL — LOW (ref 12–16)
MAGNESIUM SERPL-MCNC: 2.2 MG/DL — SIGNIFICANT CHANGE UP (ref 1.6–2.6)
MCHC RBC-ENTMCNC: 29.9 PG — SIGNIFICANT CHANGE UP (ref 27–31)
MCHC RBC-ENTMCNC: 33.5 G/DL — SIGNIFICANT CHANGE UP (ref 32–36)
MCV RBC AUTO: 89.2 FL — SIGNIFICANT CHANGE UP (ref 81–99)
PLATELET # BLD AUTO: 128 K/UL — LOW (ref 150–400)
POTASSIUM SERPL-MCNC: 3.6 MMOL/L — SIGNIFICANT CHANGE UP (ref 3.5–5.3)
POTASSIUM SERPL-SCNC: 3.6 MMOL/L — SIGNIFICANT CHANGE UP (ref 3.5–5.3)
RBC # BLD: 2.78 M/UL — LOW (ref 4.4–5.2)
RBC # FLD: 17.5 % — HIGH (ref 11–15.6)
SODIUM SERPL-SCNC: 147 MMOL/L — HIGH (ref 135–145)
WBC # BLD: 5 K/UL — SIGNIFICANT CHANGE UP (ref 4.8–10.8)
WBC # FLD AUTO: 5 K/UL — SIGNIFICANT CHANGE UP (ref 4.8–10.8)

## 2019-03-20 PROCEDURE — 99232 SBSQ HOSP IP/OBS MODERATE 35: CPT

## 2019-03-20 RX ORDER — POTASSIUM CHLORIDE 20 MEQ
40 PACKET (EA) ORAL ONCE
Qty: 0 | Refills: 0 | Status: COMPLETED | OUTPATIENT
Start: 2019-03-20 | End: 2019-03-20

## 2019-03-20 RX ORDER — LOSARTAN POTASSIUM 100 MG/1
25 TABLET, FILM COATED ORAL DAILY
Qty: 0 | Refills: 0 | Status: DISCONTINUED | OUTPATIENT
Start: 2019-03-20 | End: 2019-03-26

## 2019-03-20 RX ADMIN — CARVEDILOL PHOSPHATE 6.25 MILLIGRAM(S): 80 CAPSULE, EXTENDED RELEASE ORAL at 05:09

## 2019-03-20 RX ADMIN — Medication 81 MILLIGRAM(S): at 12:14

## 2019-03-20 RX ADMIN — ISOSORBIDE MONONITRATE 60 MILLIGRAM(S): 60 TABLET, EXTENDED RELEASE ORAL at 12:14

## 2019-03-20 RX ADMIN — AMLODIPINE BESYLATE 5 MILLIGRAM(S): 2.5 TABLET ORAL at 05:09

## 2019-03-20 RX ADMIN — Medication 2: at 12:12

## 2019-03-20 RX ADMIN — TAMSULOSIN HYDROCHLORIDE 0.4 MILLIGRAM(S): 0.4 CAPSULE ORAL at 21:21

## 2019-03-20 RX ADMIN — Medication 5 UNIT(S): at 12:11

## 2019-03-20 RX ADMIN — GABAPENTIN 100 MILLIGRAM(S): 400 CAPSULE ORAL at 05:09

## 2019-03-20 RX ADMIN — Medication 650 MILLIGRAM(S): at 13:46

## 2019-03-20 RX ADMIN — Medication 1 TABLET(S): at 12:13

## 2019-03-20 RX ADMIN — INSULIN GLARGINE 30 UNIT(S): 100 INJECTION, SOLUTION SUBCUTANEOUS at 21:18

## 2019-03-20 RX ADMIN — PANTOPRAZOLE SODIUM 40 MILLIGRAM(S): 20 TABLET, DELAYED RELEASE ORAL at 05:09

## 2019-03-20 RX ADMIN — Medication 5 UNIT(S): at 17:45

## 2019-03-20 RX ADMIN — GABAPENTIN 100 MILLIGRAM(S): 400 CAPSULE ORAL at 21:21

## 2019-03-20 RX ADMIN — GABAPENTIN 100 MILLIGRAM(S): 400 CAPSULE ORAL at 14:08

## 2019-03-20 RX ADMIN — ENOXAPARIN SODIUM 40 MILLIGRAM(S): 100 INJECTION SUBCUTANEOUS at 21:19

## 2019-03-20 RX ADMIN — Medication 650 MILLIGRAM(S): at 21:19

## 2019-03-20 RX ADMIN — CARVEDILOL PHOSPHATE 6.25 MILLIGRAM(S): 80 CAPSULE, EXTENDED RELEASE ORAL at 17:47

## 2019-03-20 RX ADMIN — SENNA PLUS 2 TABLET(S): 8.6 TABLET ORAL at 21:21

## 2019-03-20 RX ADMIN — Medication 6: at 21:18

## 2019-03-20 RX ADMIN — Medication 100 MILLIGRAM(S): at 17:47

## 2019-03-20 RX ADMIN — Medication 500 MILLIGRAM(S): at 12:14

## 2019-03-20 RX ADMIN — DULOXETINE HYDROCHLORIDE 60 MILLIGRAM(S): 30 CAPSULE, DELAYED RELEASE ORAL at 12:13

## 2019-03-20 RX ADMIN — Medication 60 MILLIGRAM(S): at 05:09

## 2019-03-20 RX ADMIN — ERYTHROPOIETIN 20000 UNIT(S): 10000 INJECTION, SOLUTION INTRAVENOUS; SUBCUTANEOUS at 21:43

## 2019-03-20 RX ADMIN — Medication 40 MILLIGRAM(S): at 17:46

## 2019-03-20 RX ADMIN — Medication 650 MILLIGRAM(S): at 22:10

## 2019-03-20 RX ADMIN — Medication 650 MILLIGRAM(S): at 12:12

## 2019-03-20 RX ADMIN — Medication 40 MILLIGRAM(S): at 05:10

## 2019-03-20 RX ADMIN — Medication 40 MILLIEQUIVALENT(S): at 21:20

## 2019-03-20 RX ADMIN — ATORVASTATIN CALCIUM 80 MILLIGRAM(S): 80 TABLET, FILM COATED ORAL at 21:21

## 2019-03-20 RX ADMIN — Medication 60 MILLIGRAM(S): at 17:46

## 2019-03-20 RX ADMIN — Medication 5 UNIT(S): at 09:03

## 2019-03-20 RX ADMIN — Medication 100 MILLIGRAM(S): at 05:09

## 2019-03-20 RX ADMIN — MAGNESIUM OXIDE 400 MG ORAL TABLET 400 MILLIGRAM(S): 241.3 TABLET ORAL at 12:14

## 2019-03-20 NOTE — PROGRESS NOTE ADULT - SUBJECTIVE AND OBJECTIVE BOX
KUN ROCHA    [id number]    63y      Female    Patient is a 63y old  Female who presents with a chief complaint of SOB (20 Mar 2019 16:38)      INTERVAL HPI/OVERNIGHT EVENTS:    Patient is feeling much better off BIPAP on nasal cannula, she is sitting on the chair, she denies any fever, chills, hest pain, nausea, vomiting, dizziness    REVIEW OF SYSTEMS:    CONSTITUTIONAL: No fever, some fatigue  RESPIRATORY: No cough, improving shortness of breath  CARDIOVASCULAR: No chest pain, palpitations  GASTROINTESTINAL: No abdominal, No nausea, vomiting  NEUROLOGICAL: No headaches,  loss of strength.  MISCELLANEOUS: No joint swelling or pain        Vital Signs Last 24 Hrs  T(C): 36.9 (20 Mar 2019 15:22), Max: 36.9 (20 Mar 2019 09:40)  T(F): 98.5 (20 Mar 2019 15:22), Max: 98.5 (20 Mar 2019 15:22)  HR: 71 (20 Mar 2019 15:22) (62 - 88)  BP: 116/53 (20 Mar 2019 15:22) (116/53 - 128/56)  RR: 16 (20 Mar 2019 15:22) (15 - 19)  SpO2: 94% (20 Mar 2019 15:22) (94% - 98%)    PHYSICAL EXAM:      GENERAL: Middle age male looking comfortable   HEENT: Legally blind, nasal cannula on  NECK: soft, Supple, No JVD,   CHEST/LUNG: decrease air entry bilaterally; mostly decrease right lower, No wheezing  HEART: S1S2+, Regular rate and rhythm; No murmurs  ABDOMEN: Soft, Nontender, Nondistended; Bowel sounds present  EXTREMITIES:  1+ Peripheral Pulses, has edema  SKIN: No rashes or lesions  NEURO: AAOX3, no focal deficits, no motor r sensory loss  PSYCH: normal mood      LABS:                        8.3    5.0   )-----------( 128      ( 20 Mar 2019 06:38 )             24.8     03-20    147<H>  |  102  |  69.0<H>  ----------------------------<  111  3.6   |  34.0<H>  |  1.03    Ca    8.4<L>      20 Mar 2019 06:38  Phos  3.1     03-19  Mg     2.2     03-20      PT/INR - ( 19 Mar 2019 06:15 )   PT: 14.1 sec;   INR: 1.22 ratio                 I&O's Summary    19 Mar 2019 07:01  -  20 Mar 2019 07:00  --------------------------------------------------------  IN: 560 mL / OUT: 3100 mL / NET: -2540 mL    20 Mar 2019 07:01  -  20 Mar 2019 17:13  --------------------------------------------------------  IN: 580 mL / OUT: 500 mL / NET: 80 mL        MEDICATIONS  (STANDING):  ALBUTerol    0.083% 10 milliGRAM(s) Nebulizer once  amLODIPine   Tablet 5 milliGRAM(s) Oral daily  ascorbic acid 500 milliGRAM(s) Oral daily  aspirin  chewable 81 milliGRAM(s) Oral daily  atorvastatin 80 milliGRAM(s) Oral at bedtime  carvedilol 6.25 milliGRAM(s) Oral every 12 hours  dextrose 5%. 1000 milliLiter(s) (50 mL/Hr) IV Continuous <Continuous>  dextrose 50% Injectable 12.5 Gram(s) IV Push once  dextrose 50% Injectable 25 Gram(s) IV Push once  dextrose 50% Injectable 25 Gram(s) IV Push once  docusate sodium 100 milliGRAM(s) Oral two times a day  DULoxetine 60 milliGRAM(s) Oral daily  enoxaparin Injectable 40 milliGRAM(s) SubCutaneous daily  epoetin sara Injectable 38804 Unit(s) SubCutaneous <User Schedule>  furosemide   Injectable 60 milliGRAM(s) IV Push every 12 hours  gabapentin 100 milliGRAM(s) Oral three times a day  insulin glargine Injectable (LANTUS) 30 Unit(s) SubCutaneous at bedtime  insulin lispro (HumaLOG) corrective regimen sliding scale   SubCutaneous Before meals and at bedtime  insulin lispro Injectable (HumaLOG) 5 Unit(s) SubCutaneous three times a day before meals  isosorbide   mononitrate ER Tablet (IMDUR) 60 milliGRAM(s) Oral daily  magnesium oxide 400 milliGRAM(s) Oral daily  methylPREDNISolone sodium succinate Injectable 40 milliGRAM(s) IV Push every 12 hours  Nephro-riya 1 Tablet(s) Oral daily  pantoprazole    Tablet 40 milliGRAM(s) Oral before breakfast  polyethylene glycol 3350 17 Gram(s) Oral daily  senna 2 Tablet(s) Oral at bedtime  tamsulosin 0.4 milliGRAM(s) Oral at bedtime    MEDICATIONS  (PRN):  acetaminophen   Tablet .. 650 milliGRAM(s) Oral every 6 hours PRN Mild Pain (1 - 3)  ALBUTerol/ipratropium for Nebulization 3 milliLiter(s) Nebulizer every 6 hours PRN Shortness of Breath and/or Wheezing  dextrose 40% Gel 15 Gram(s) Oral once PRN Blood Glucose LESS THAN 70 milliGRAM(s)/deciliter  glucagon  Injectable 1 milliGRAM(s) IntraMuscular once PRN Glucose LESS THAN 70 milligrams/deciliter  glycopyrrolate Injectable 0.1 milliGRAM(s) IV Push every 8 hours PRN secreations  guaiFENesin    Syrup 100 milliGRAM(s) Oral every 6 hours PRN Cough  hydrALAZINE Injectable 10 milliGRAM(s) IV Push every 4 hours PRN sbp> 180  ondansetron Injectable 4 milliGRAM(s) IV Push every 6 hours PRN Nausea and/or Vomiting KUN ROCHA    [id number]    63y      Female    Patient is a 63y old  Female who presents with a chief complaint of SOB (20 Mar 2019 16:38)      INTERVAL HPI/OVERNIGHT EVENTS:    Patient is feeling much better off BIPAP on nasal cannula, she is sitting on the chair having her lunch, she denies any fever, chills, hest pain, nausea, vomiting, dizziness    REVIEW OF SYSTEMS:    CONSTITUTIONAL: No fever, some fatigue  RESPIRATORY: No cough, shortness of breath much better.  CARDIOVASCULAR: No chest pain, palpitations  GASTROINTESTINAL: No abdominal, No nausea, vomiting  NEUROLOGICAL: No headaches,  loss of strength.  MISCELLANEOUS: No joint swelling or pain        Vital Signs Last 24 Hrs  T(C): 36.9 (20 Mar 2019 15:22), Max: 36.9 (20 Mar 2019 09:40)  T(F): 98.5 (20 Mar 2019 15:22), Max: 98.5 (20 Mar 2019 15:22)  HR: 71 (20 Mar 2019 15:22) (62 - 88)  BP: 116/53 (20 Mar 2019 15:22) (116/53 - 128/56)  RR: 16 (20 Mar 2019 15:22) (15 - 19)  SpO2: 94% (20 Mar 2019 15:22) (94% - 98%)    PHYSICAL EXAM:      GENERAL: Middle age male looking comfortable   HEENT: Legally blind, nasal cannula on  NECK: soft, Supple, No JVD,   CHEST/LUNG: decrease air entry bilaterally; mostly decrease right lower, No wheezing  HEART: S1S2+, Regular rate and rhythm; No murmurs  ABDOMEN: Soft, Nontender, Nondistended; Bowel sounds present  EXTREMITIES:  1+ Peripheral Pulses, has edema  SKIN: No rashes or lesions  NEURO: AAOX3, no focal deficits, no motor r sensory loss  PSYCH: normal mood      LABS:                        8.3    5.0   )-----------( 128      ( 20 Mar 2019 06:38 )             24.8     03-20    147<H>  |  102  |  69.0<H>  ----------------------------<  111  3.6   |  34.0<H>  |  1.03    Ca    8.4<L>      20 Mar 2019 06:38  Phos  3.1     03-19  Mg     2.2     03-20      PT/INR - ( 19 Mar 2019 06:15 )   PT: 14.1 sec;   INR: 1.22 ratio                 I&O's Summary    19 Mar 2019 07:01  -  20 Mar 2019 07:00  --------------------------------------------------------  IN: 560 mL / OUT: 3100 mL / NET: -2540 mL    20 Mar 2019 07:01  -  20 Mar 2019 17:13  --------------------------------------------------------  IN: 580 mL / OUT: 500 mL / NET: 80 mL        MEDICATIONS  (STANDING):  ALBUTerol    0.083% 10 milliGRAM(s) Nebulizer once  amLODIPine   Tablet 5 milliGRAM(s) Oral daily  ascorbic acid 500 milliGRAM(s) Oral daily  aspirin  chewable 81 milliGRAM(s) Oral daily  atorvastatin 80 milliGRAM(s) Oral at bedtime  carvedilol 6.25 milliGRAM(s) Oral every 12 hours  dextrose 5%. 1000 milliLiter(s) (50 mL/Hr) IV Continuous <Continuous>  dextrose 50% Injectable 12.5 Gram(s) IV Push once  dextrose 50% Injectable 25 Gram(s) IV Push once  dextrose 50% Injectable 25 Gram(s) IV Push once  docusate sodium 100 milliGRAM(s) Oral two times a day  DULoxetine 60 milliGRAM(s) Oral daily  enoxaparin Injectable 40 milliGRAM(s) SubCutaneous daily  epoetin sara Injectable 44279 Unit(s) SubCutaneous <User Schedule>  furosemide   Injectable 60 milliGRAM(s) IV Push every 12 hours  gabapentin 100 milliGRAM(s) Oral three times a day  insulin glargine Injectable (LANTUS) 30 Unit(s) SubCutaneous at bedtime  insulin lispro (HumaLOG) corrective regimen sliding scale   SubCutaneous Before meals and at bedtime  insulin lispro Injectable (HumaLOG) 5 Unit(s) SubCutaneous three times a day before meals  isosorbide   mononitrate ER Tablet (IMDUR) 60 milliGRAM(s) Oral daily  magnesium oxide 400 milliGRAM(s) Oral daily  methylPREDNISolone sodium succinate Injectable 40 milliGRAM(s) IV Push every 12 hours  Nephro-riya 1 Tablet(s) Oral daily  pantoprazole    Tablet 40 milliGRAM(s) Oral before breakfast  polyethylene glycol 3350 17 Gram(s) Oral daily  senna 2 Tablet(s) Oral at bedtime  tamsulosin 0.4 milliGRAM(s) Oral at bedtime    MEDICATIONS  (PRN):  acetaminophen   Tablet .. 650 milliGRAM(s) Oral every 6 hours PRN Mild Pain (1 - 3)  ALBUTerol/ipratropium for Nebulization 3 milliLiter(s) Nebulizer every 6 hours PRN Shortness of Breath and/or Wheezing  dextrose 40% Gel 15 Gram(s) Oral once PRN Blood Glucose LESS THAN 70 milliGRAM(s)/deciliter  glucagon  Injectable 1 milliGRAM(s) IntraMuscular once PRN Glucose LESS THAN 70 milligrams/deciliter  glycopyrrolate Injectable 0.1 milliGRAM(s) IV Push every 8 hours PRN secreations  guaiFENesin    Syrup 100 milliGRAM(s) Oral every 6 hours PRN Cough  hydrALAZINE Injectable 10 milliGRAM(s) IV Push every 4 hours PRN sbp> 180  ondansetron Injectable 4 milliGRAM(s) IV Push every 6 hours PRN Nausea and/or Vomiting

## 2019-03-20 NOTE — PROGRESS NOTE ADULT - ASSESSMENT
Improved KEITH on CKD stage III in setting of decompensated CHF (R > L)  cr 1.0 today  Elevated BUN also due to steroids  1.1g proteinuria  cont Lasix  ==> will need to tolerate azotemia  Urology to do TOV today with monitoring of PVR     Anemia: +CKD +GIB  s/p IV Fe==> Tsat=27%  Continue Epogen     Will follow

## 2019-03-20 NOTE — PROGRESS NOTE ADULT - ASSESSMENT
64yo female with multiple medical issues, urinary retention, improving  - TOV this am  - bladder scan 6 hrs after fontenot cath removed or after first void  - cont oob to chair/ambulate

## 2019-03-20 NOTE — PROGRESS NOTE ADULT - ASSESSMENT
62 yo presenting to ED w/ SOB 2/2 diastolic HF exacerbation and noted to have KEITH and hyperkalemia, PMH CAD s/p CABG and PCI, PAD s/p baloon angioplasty, DM2, HTN, HLD, hx of cardiac arrest, recent GI bleed last month, Diastolic CHF, legally blind, pleural effusion requiring thoracentesis in Dec 2018, admitted for SOB, noted to have pleural effusion, started on bumex, s/p RRT noted on 3/1 for hypotension & lethargy. Pt became hypotensive after Bumex with albumin was given.  CXR with worsening plum edema b/l. BiPAP placed. ICU consult called for vasopressor support during IV diuresis. Midodrine ordered. She became increasingly lethargic with ABG showing resp acidosis with hypoxia. Pulmonary team has been following, she was transferred in ICU as she was having worsening respiratory failure, in ICU she was given High dose steroids as there is possibility of cryptogenic Pneumonia and she was also continued with IV lasix, she improved and was weaned off BiPAP and transfered under Hospitalist service.         A/p    > Acute on Chronic Diastolic Heart Failure with severe PHTN  & right ventricular failure from OHS/ KYREE complicated by pulm edema & pleural effusions  -prior exac resolved, now with recurrence,  sp chest tube w/ drainage, removed by CT sx on 3/12, uncomplicated,  held torsemide, resume lasix 60mg iv bid, strict i/o, continued with lasix IV along with coreg & Imdur, losartan, ECHO shows EF of > 75%. Moderate to Severe TR, mod-severe PHTN, Cardio on board, can consider IR follow up to assess for pleurex catheter placement, CXR repeated looks like worsening of effusion, she required 100% oxygen, due to worsening respiratory failure, ICU was consulted and she was taken to the ICU she was continued initially with BiPAP and Lasix and they added high dose steriods, as there was ? cryptogenic organizing PNA, she improved and was weaned off BiPAP and transferred under Hospitalist service, currently she on solumedrole 40mg Q8 hours, will taper down slowly, pulmonary is following, she appears to be doing better now that steroids have been restarted and is on being, repeat CXR portable is looking pretty much the same, will get PA lateral view of the chest and will get better view of the chest, tolerating NC during the day, Tolerating PO diet. Continue lasix daily, steroids, OOB to chair. BIPAP nocturnally.       > Hemoptysis:   per pt present for several days, no recurrence noted on 3/13, ? related to superficial lac in trachea from use of around the clock o2  h/h stable, resume asa + dvt ppx for now, serial h/h, repeat ct chest reviewed, no acute findings that require intervention noted  pulm fu appreciated, no more episode.     > Acute on Chronic Hypercapnic Respiratory Failure due to OHS/KYREE.   stable, Pulm recommend chronic NIV for chronic hypercarbic respiratory failure to improve symptoms and to decrease hospitalizations  c/w Bipap as needed and nocturnal, Eventual SINGH with NIV, c/w steroid taper to PO, avoid Narcotics for pain.    > RT PTX  - small lesion, noted on CT  - sp chest tube placement + removal  - resolved    >UTI - appreciate ID input, sp completed course of invanz, resolved    >Dysphagia - had a RR due to aspiration, speech eval appreciated - recommend regular diet with thin liquid, aspiration precautions    >Chest pain with hx of CAD & CABG/PCI-  sp PRBC to improve oxygen to myocardium  c/w Imdur, BB, statin  ASA as above   Plavix on hold since last admission due to GI Bleed.     > Lower GI Bleed likely diverticular:   - s/p 1 unit of PRBC, well tolerated, uncomplicated  - s/p Colonoscopy - Poor prep throughout the colon. Diverticulosis seen. Unable to visualize colon sufficiently  No further GI w/u recommended,   cleared by GI to use asa but current use as above  Plavix yet on hold, outpt F/U    >Hyperkalemia  - resolved  - Holding Losartan, resume as needed for HF + bp control    > Acute on chronic kidney injury - resolved  appreciate renal input - - cont Torsemide ==> will need to tolerate azotemia,   will resume losartan, repeat BMP in am.   f/u BMP     > CAD complicated by stents in 2014 / PAD / HLD / HTN  - Continue, Imdur, Lipitor, Coreg and Norvasc   - Plavix on hold since last admission due to GI Bleed.     > DM 2 not on long term insulin complicated by asymptomatic hyperglycemia  - HbA1c 5.6, goal < 8, her lantus was increase to 30units and on humlog 5 along with sliding scale, previous she was on   - Lantus at 10 units at bedtime plus Humalog 3U, it was increase due to high dose steriods.   - c/w moderate sliding scale    > Anemia  - Multifactorial (chronic, complicated with acute gi bleeding during admit)  - s/p Venofer   - Continue Procrit  + stool for occult blood (will need GI work-up as outpt)  - Monitor CBC  - per renal goal hgb 10.     > Transaminitis- resolved  - Likely secondary to liver congestion    > History of Depression  - chronic, stable, uncomplicated  - Continue Duloxetine 60 mg    >B/L UE swelling  - Arm elevation  - Warm compresses   - No DVT     >Obesity in setting of Acute Moderate Protein Calorie Malnutrition  bmi 36.1  Nepro TID  Neprovite and Vit C 500mg daily  daily wt.     hematuria: Urology consulted, patient has fontenot's cathter, hematuria resolved, voiding trial, will follow urology.     DVT Prophylaxis -- hold in light of hemoptysis, may resume depending on outcome of w/u    >Goals of care - palliative on board   DNR  prognosis guarded, case discussed w/ palliative    >dispo. PT darius appreciated, transfer to [facility] when medically stable after w/u of hemoptysis completed. 62 yo presenting to ED w/ SOB 2/2 diastolic HF exacerbation and noted to have KEITH and hyperkalemia, PMH CAD s/p CABG and PCI, PAD s/p baloon angioplasty, DM2, HTN, HLD, hx of cardiac arrest, recent GI bleed last month, Diastolic CHF, legally blind, pleural effusion requiring thoracentesis in Dec 2018, admitted for SOB, noted to have pleural effusion, started on bumex, s/p RRT noted on 3/1 for hypotension & lethargy. Pt became hypotensive after Bumex with albumin was given.  CXR with worsening plum edema b/l. BiPAP placed. ICU consult called for vasopressor support during IV diuresis. Midodrine ordered. She became increasingly lethargic with ABG showing resp acidosis with hypoxia. Pulmonary team has been following, she was transferred in ICU as she was having worsening respiratory failure, in ICU she was given High dose steroids as there is possibility of cryptogenic Pneumonia and she was also continued with IV lasix, she improved and was weaned off BiPAP and transfered under Hospitalist service.         A/p    > Acute on Chronic Diastolic Heart Failure with severe PHTN  & right ventricular failure from OHS/ KYREE complicated by pulm edema & pleural effusions  -prior exac resolved, now with recurrence,  sp chest tube w/ drainage, removed by CT sx on 3/12, uncomplicated,  held torsemide, resume lasix 60mg iv bid, strict i/o, continued with lasix IV along with coreg & Imdur, losartan, ECHO shows EF of > 75%. Moderate to Severe TR, mod-severe PHTN, Cardio saw the patient, CXR repeated looked like worsening of effusion, she required 100% oxygen, due to worsening respiratory failure, ICU was consulted and she was taken to the ICU she was continued initially with BiPAP and Lasix and they added high dose steriods, as there was ? cryptogenic organizing PNA, she improved and was weaned off BiPAP and transferred under Hospitalist service, she on solumedrole 40mg Q8 hours, tapering down slowly, pulmonary is following, chnaged to 40 Q12h, she appears to be doing better now that steroids have been restarted and is on being, tolerating NC during the day, Tolerating PO diet. Continue lasix IV BID, will discuss with cardiology for changing her IV lasix to po torsamide or lasix, she was torsamide 20mg BID, OOB to chair. BIPAP nocturnally, will get PT eval again.       > Hemoptysis:   per pt present for several days, no recurrence noted on 3/13, ? related to superficial lac in trachea from use of around the clock o2  h/h stable, resume asa + dvt ppx for now, serial h/h, repeat ct chest reviewed, no acute findings that require intervention noted  pulm fu appreciated, no more episode.     > Acute on Chronic Hypercapnic Respiratory Failure due to OHS/KYREE.   stable, Pulm recommend chronic NIV for chronic hypercarbic respiratory failure to improve symptoms and to decrease hospitalizations  c/w Bipap as needed and nocturnal, Eventual SINGH with NIV, c/w steroid taper to PO, avoid Narcotics for pain, improving now.     > RT PTX  - small lesion, noted on CT  - sp chest tube placement + removal  - resolved    >UTI - appreciate ID input, sp completed course of invanz, resolved    >Dysphagia - had a RR due to aspiration, speech eval appreciated - recommend regular diet with thin liquid, aspiration precautions    >Chest pain with hx of CAD & CABG/PCI-  sp PRBC to improve oxygen to myocardium  c/w Imdur, BB, statin  ASA as above   Plavix on hold since last admission due to GI Bleed.     > Lower GI Bleed likely diverticular:   - s/p 1 unit of PRBC, well tolerated, uncomplicated  - s/p Colonoscopy - Poor prep throughout the colon. Diverticulosis seen. Unable to visualize colon sufficiently  No further GI w/u recommended,   cleared by GI to use asa but current use as above  Plavix yet on hold, outpt F/U    >Hyperkalemia  - resolved  - Holding Losartan, resume as needed for HF + bp control    > Acute on chronic kidney injury - resolved  appreciate renal input - - cont Torsemide ==> will need to tolerate azotemia,   will resume losartan, repeat BMP in am.   f/u BMP     > CAD complicated by stents in 2014 / PAD / HLD / HTN  - Continue, Imdur, Lipitor, Coreg and Norvasc   - Plavix on hold since last admission due to GI Bleed.     > DM 2 not on long term insulin complicated by asymptomatic hyperglycemia  - HbA1c 5.6, goal < 8, her lantus was increase to 30units and on humlog 5 along with sliding scale, previous she was on   - Lantus at 10 units at bedtime plus Humalog 3U, it was increase due to high dose steriods.   - c/w moderate sliding scale    > Anemia  - Multifactorial (chronic, complicated with acute gi bleeding during admit)  - s/p Venofer   - Continue Procrit  + stool for occult blood (will need GI work-up as outpt)  - Monitor CBC  - per renal goal hgb 10.     > Transaminitis- resolved  - Likely secondary to liver congestion    > History of Depression  - chronic, stable, uncomplicated  - Continue Duloxetine 60 mg    >B/L UE swelling  - Arm elevation  - Warm compresses   - No DVT, resolved.      >Obesity in setting of Acute Moderate Protein Calorie Malnutrition  bmi 36.1  Nepro TID  Neprovite and Vit C 500mg daily  daily wt.     hematuria: Urology consulted, patient has fontenot's cathter, hematuria resolved, getting voiding trial, will follow urology.     DVT Prophylaxis -- hold in light of hemoptysis, may resume depending on outcome of w/u    >Goals of care - palliative on boardm, she is full code.     prognosis guarded, case discussed w/ palliative    >dispo. PT eval appreciated, transfer to [facility] when medically stable

## 2019-03-20 NOTE — PROGRESS NOTE ADULT - SUBJECTIVE AND OBJECTIVE BOX
Subjective:63yFemale resting comfortably in chair.  No SOB, feeling well.  Russo catheter was removed this am.      Vital Signs Last 24 Hrs  T(C): 36.9 (20 Mar 2019 09:40), Max: 36.9 (20 Mar 2019 09:40)  T(F): 98.4 (20 Mar 2019 09:40), Max: 98.4 (20 Mar 2019 09:40)  HR: 68 (20 Mar 2019 09:40) (62 - 88)  BP: 122/66 (20 Mar 2019 09:40) (122/66 - 129/62)  BP(mean): --  RR: 15 (20 Mar 2019 09:40) (15 - 19)  SpO2: 95% (20 Mar 2019 09:40) (94% - 98%)    I&O's Detail    19 Mar 2019 07:01  -  20 Mar 2019 07:00  --------------------------------------------------------  IN:    Oral Fluid: 560 mL  Total IN: 560 mL    OUT:    Indwelling Catheter - Urethral: 3100 mL  Total OUT: 3100 mL    Total NET: -2540 mL      20 Mar 2019 07:01  -  20 Mar 2019 11:04  --------------------------------------------------------  IN:    Oral Fluid: 240 mL  Total IN: 240 mL    OUT:    Indwelling Catheter - Urethral: 500 mL  Total OUT: 500 mL    Total NET: -260 mL          Labs:                        8.3    5.0   )-----------( 128      ( 20 Mar 2019 06:38 )             24.8     03-20    147<H>  |  102  |  69.0<H>  ----------------------------<  111  3.6   |  34.0<H>  |  1.03    Ca    8.4<L>      20 Mar 2019 06:38  Phos  3.1     03-19  Mg     2.2     03-20      PT/INR - ( 19 Mar 2019 06:15 )   PT: 14.1 sec;   INR: 1.22 ratio

## 2019-03-20 NOTE — PROGRESS NOTE ADULT - SUBJECTIVE AND OBJECTIVE BOX
NEPHROLOGY INTERVAL HPI/OVERNIGHT EVENTS:    Examined earlier    MEDICATIONS  (STANDING):  ALBUTerol    0.083% 10 milliGRAM(s) Nebulizer once  amLODIPine   Tablet 5 milliGRAM(s) Oral daily  ascorbic acid 500 milliGRAM(s) Oral daily  aspirin  chewable 81 milliGRAM(s) Oral daily  atorvastatin 80 milliGRAM(s) Oral at bedtime  carvedilol 6.25 milliGRAM(s) Oral every 12 hours  dextrose 5%. 1000 milliLiter(s) (50 mL/Hr) IV Continuous <Continuous>  dextrose 50% Injectable 12.5 Gram(s) IV Push once  dextrose 50% Injectable 25 Gram(s) IV Push once  dextrose 50% Injectable 25 Gram(s) IV Push once  docusate sodium 100 milliGRAM(s) Oral two times a day  DULoxetine 60 milliGRAM(s) Oral daily  enoxaparin Injectable 40 milliGRAM(s) SubCutaneous daily  epoetin sara Injectable 00102 Unit(s) SubCutaneous <User Schedule>  furosemide   Injectable 60 milliGRAM(s) IV Push every 12 hours  gabapentin 100 milliGRAM(s) Oral three times a day  insulin glargine Injectable (LANTUS) 30 Unit(s) SubCutaneous at bedtime  insulin lispro (HumaLOG) corrective regimen sliding scale   SubCutaneous Before meals and at bedtime  insulin lispro Injectable (HumaLOG) 5 Unit(s) SubCutaneous three times a day before meals  isosorbide   mononitrate ER Tablet (IMDUR) 60 milliGRAM(s) Oral daily  magnesium oxide 400 milliGRAM(s) Oral daily  methylPREDNISolone sodium succinate Injectable 40 milliGRAM(s) IV Push every 12 hours  Nephro-riya 1 Tablet(s) Oral daily  pantoprazole    Tablet 40 milliGRAM(s) Oral before breakfast  polyethylene glycol 3350 17 Gram(s) Oral daily  senna 2 Tablet(s) Oral at bedtime  tamsulosin 0.4 milliGRAM(s) Oral at bedtime    MEDICATIONS  (PRN):  acetaminophen   Tablet .. 650 milliGRAM(s) Oral every 6 hours PRN Mild Pain (1 - 3)  ALBUTerol/ipratropium for Nebulization 3 milliLiter(s) Nebulizer every 6 hours PRN Shortness of Breath and/or Wheezing  dextrose 40% Gel 15 Gram(s) Oral once PRN Blood Glucose LESS THAN 70 milliGRAM(s)/deciliter  glucagon  Injectable 1 milliGRAM(s) IntraMuscular once PRN Glucose LESS THAN 70 milligrams/deciliter  glycopyrrolate Injectable 0.1 milliGRAM(s) IV Push every 8 hours PRN secreations  guaiFENesin    Syrup 100 milliGRAM(s) Oral every 6 hours PRN Cough  hydrALAZINE Injectable 10 milliGRAM(s) IV Push every 4 hours PRN sbp> 180  ondansetron Injectable 4 milliGRAM(s) IV Push every 6 hours PRN Nausea and/or Vomiting      Allergies    Accupril (Other)    Intolerances        Vital Signs Last 24 Hrs  T(C): 36.9 (20 Mar 2019 15:22), Max: 36.9 (20 Mar 2019 09:40)  T(F): 98.5 (20 Mar 2019 15:22), Max: 98.5 (20 Mar 2019 15:22)  HR: 71 (20 Mar 2019 15:22) (62 - 88)  BP: 116/53 (20 Mar 2019 15:22) (116/53 - 128/56)  BP(mean): --  RR: 16 (20 Mar 2019 15:22) (15 - 19)  SpO2: 94% (20 Mar 2019 15:22) (94% - 98%)  Daily     Daily Weight in k.6 (20 Mar 2019 05:50)    PHYSICAL EXAM:  Cardiovascular: Normal S1 S2, No rub  Respiratory: Clear lungs with decreased BS at bases	  Gastrointestinal:  Soft, Non-tender, + BS	  Extremities: + LE edema  Still with fontenot , clearing     LABS:                        8.3    5.0   )-----------( 128      ( 20 Mar 2019 06:38 )             24.8     03-20    147<H>  |  102  |  69.0<H>  ----------------------------<  111  3.6   |  34.0<H>  |  1.03    Ca    8.4<L>      20 Mar 2019 06:38  Phos  3.1     03-19  Mg     2.2     03-20      PT/INR - ( 19 Mar 2019 06:15 )   PT: 14.1 sec;   INR: 1.22 ratio             Magnesium, Serum: 2.2 mg/dL ( @ 06:38)          RADIOLOGY & ADDITIONAL TESTS:

## 2019-03-21 LAB
ANION GAP SERPL CALC-SCNC: 11 MMOL/L — SIGNIFICANT CHANGE UP (ref 5–17)
BUN SERPL-MCNC: 71 MG/DL — HIGH (ref 8–20)
CALCIUM SERPL-MCNC: 8.5 MG/DL — LOW (ref 8.6–10.2)
CHLORIDE SERPL-SCNC: 100 MMOL/L — SIGNIFICANT CHANGE UP (ref 98–107)
CO2 SERPL-SCNC: 33 MMOL/L — HIGH (ref 22–29)
CREAT SERPL-MCNC: 1.12 MG/DL — SIGNIFICANT CHANGE UP (ref 0.5–1.3)
GLUCOSE BLDC GLUCOMTR-MCNC: 117 MG/DL — HIGH (ref 70–99)
GLUCOSE BLDC GLUCOMTR-MCNC: 147 MG/DL — HIGH (ref 70–99)
GLUCOSE BLDC GLUCOMTR-MCNC: 154 MG/DL — HIGH (ref 70–99)
GLUCOSE BLDC GLUCOMTR-MCNC: 197 MG/DL — HIGH (ref 70–99)
GLUCOSE BLDC GLUCOMTR-MCNC: 203 MG/DL — HIGH (ref 70–99)
GLUCOSE BLDC GLUCOMTR-MCNC: 77 MG/DL — SIGNIFICANT CHANGE UP (ref 70–99)
GLUCOSE SERPL-MCNC: 106 MG/DL — SIGNIFICANT CHANGE UP (ref 70–115)
HCT VFR BLD CALC: 23.8 % — LOW (ref 37–47)
HGB BLD-MCNC: 8 G/DL — LOW (ref 12–16)
MAGNESIUM SERPL-MCNC: 2.2 MG/DL — SIGNIFICANT CHANGE UP (ref 1.6–2.6)
MCHC RBC-ENTMCNC: 30 PG — SIGNIFICANT CHANGE UP (ref 27–31)
MCHC RBC-ENTMCNC: 33.6 G/DL — SIGNIFICANT CHANGE UP (ref 32–36)
MCV RBC AUTO: 89.1 FL — SIGNIFICANT CHANGE UP (ref 81–99)
NT-PROBNP SERPL-SCNC: 6562 PG/ML — HIGH (ref 0–300)
PHOSPHATE SERPL-MCNC: 2.1 MG/DL — LOW (ref 2.4–4.7)
PLATELET # BLD AUTO: 125 K/UL — LOW (ref 150–400)
POTASSIUM SERPL-MCNC: 4.5 MMOL/L — SIGNIFICANT CHANGE UP (ref 3.5–5.3)
POTASSIUM SERPL-SCNC: 4.5 MMOL/L — SIGNIFICANT CHANGE UP (ref 3.5–5.3)
RBC # BLD: 2.67 M/UL — LOW (ref 4.4–5.2)
RBC # FLD: 18.1 % — HIGH (ref 11–15.6)
SODIUM SERPL-SCNC: 144 MMOL/L — SIGNIFICANT CHANGE UP (ref 135–145)
WBC # BLD: 5.6 K/UL — SIGNIFICANT CHANGE UP (ref 4.8–10.8)
WBC # FLD AUTO: 5.6 K/UL — SIGNIFICANT CHANGE UP (ref 4.8–10.8)

## 2019-03-21 PROCEDURE — 12345: CPT | Mod: NC

## 2019-03-21 PROCEDURE — 71045 X-RAY EXAM CHEST 1 VIEW: CPT | Mod: 26

## 2019-03-21 PROCEDURE — 99232 SBSQ HOSP IP/OBS MODERATE 35: CPT

## 2019-03-21 RX ORDER — FUROSEMIDE 40 MG
60 TABLET ORAL EVERY 12 HOURS
Qty: 0 | Refills: 0 | Status: DISCONTINUED | OUTPATIENT
Start: 2019-03-21 | End: 2019-03-22

## 2019-03-21 RX ADMIN — ISOSORBIDE MONONITRATE 60 MILLIGRAM(S): 60 TABLET, EXTENDED RELEASE ORAL at 12:04

## 2019-03-21 RX ADMIN — ATORVASTATIN CALCIUM 80 MILLIGRAM(S): 80 TABLET, FILM COATED ORAL at 22:07

## 2019-03-21 RX ADMIN — ENOXAPARIN SODIUM 40 MILLIGRAM(S): 100 INJECTION SUBCUTANEOUS at 22:07

## 2019-03-21 RX ADMIN — Medication 40 MILLIGRAM(S): at 05:05

## 2019-03-21 RX ADMIN — Medication 3 MILLILITER(S): at 15:38

## 2019-03-21 RX ADMIN — INSULIN GLARGINE 30 UNIT(S): 100 INJECTION, SOLUTION SUBCUTANEOUS at 23:20

## 2019-03-21 RX ADMIN — Medication 40 MILLIGRAM(S): at 18:19

## 2019-03-21 RX ADMIN — Medication 650 MILLIGRAM(S): at 22:09

## 2019-03-21 RX ADMIN — Medication 5 UNIT(S): at 12:43

## 2019-03-21 RX ADMIN — AMLODIPINE BESYLATE 5 MILLIGRAM(S): 2.5 TABLET ORAL at 05:06

## 2019-03-21 RX ADMIN — MAGNESIUM OXIDE 400 MG ORAL TABLET 400 MILLIGRAM(S): 241.3 TABLET ORAL at 12:04

## 2019-03-21 RX ADMIN — TAMSULOSIN HYDROCHLORIDE 0.4 MILLIGRAM(S): 0.4 CAPSULE ORAL at 22:08

## 2019-03-21 RX ADMIN — DULOXETINE HYDROCHLORIDE 60 MILLIGRAM(S): 30 CAPSULE, DELAYED RELEASE ORAL at 12:04

## 2019-03-21 RX ADMIN — SENNA PLUS 2 TABLET(S): 8.6 TABLET ORAL at 22:09

## 2019-03-21 RX ADMIN — Medication 2: at 22:08

## 2019-03-21 RX ADMIN — PANTOPRAZOLE SODIUM 40 MILLIGRAM(S): 20 TABLET, DELAYED RELEASE ORAL at 05:06

## 2019-03-21 RX ADMIN — Medication 81 MILLIGRAM(S): at 12:04

## 2019-03-21 RX ADMIN — Medication 60 MILLIGRAM(S): at 18:19

## 2019-03-21 RX ADMIN — Medication 5 UNIT(S): at 09:52

## 2019-03-21 RX ADMIN — LOSARTAN POTASSIUM 25 MILLIGRAM(S): 100 TABLET, FILM COATED ORAL at 05:05

## 2019-03-21 RX ADMIN — Medication 500 MILLIGRAM(S): at 12:04

## 2019-03-21 RX ADMIN — Medication 5 UNIT(S): at 18:50

## 2019-03-21 RX ADMIN — Medication 650 MILLIGRAM(S): at 23:00

## 2019-03-21 RX ADMIN — CARVEDILOL PHOSPHATE 6.25 MILLIGRAM(S): 80 CAPSULE, EXTENDED RELEASE ORAL at 05:06

## 2019-03-21 RX ADMIN — GABAPENTIN 100 MILLIGRAM(S): 400 CAPSULE ORAL at 15:26

## 2019-03-21 RX ADMIN — POLYETHYLENE GLYCOL 3350 17 GRAM(S): 17 POWDER, FOR SOLUTION ORAL at 22:12

## 2019-03-21 RX ADMIN — Medication 60 MILLIGRAM(S): at 05:06

## 2019-03-21 RX ADMIN — GABAPENTIN 100 MILLIGRAM(S): 400 CAPSULE ORAL at 22:08

## 2019-03-21 RX ADMIN — GABAPENTIN 100 MILLIGRAM(S): 400 CAPSULE ORAL at 05:05

## 2019-03-21 RX ADMIN — Medication 100 MILLIGRAM(S): at 05:05

## 2019-03-21 RX ADMIN — Medication 1 TABLET(S): at 12:31

## 2019-03-21 RX ADMIN — Medication 100 MILLIGRAM(S): at 18:19

## 2019-03-21 NOTE — CHART NOTE - NSCHARTNOTEFT_GEN_A_CORE
patient again desaturating, switched back to Lasix and continued with IV steroids, it could be worsening of CHF or some silent aspiration or could be COOP  as we decrease her dose of steriod now she is worsening aging, mary put her on BiPAP, switch her back on lasix and will discuss with pulmonary as well as get repeat speech therapy consult for swallowing.

## 2019-03-21 NOTE — PROGRESS NOTE ADULT - SUBJECTIVE AND OBJECTIVE BOX
62yo female with CHF, SOB,  urinary retention, hematuria resolving.  Pt resting comfortably. Pt passed her TOV yesterday. Pt voiding well.

## 2019-03-21 NOTE — PROGRESS NOTE ADULT - PROBLEM SELECTOR PLAN 6
Patient clinically better with addition  of steroids.  Though still has underlying DHF, Pulm HTN. amily has been discussed consideration for home hospice services  Family RESCINDED  DNR/I     Patient confirms WANTS CPR and Intubation  Palliative  Care to sign off. Please call if any changes Patient clinically better with addition  of steroids.  Though still has underlying DHF, Pulm HTN. Family has been discussed consideration for home hospice services  Family RESCINDED  DNR/I     Patient confirms WANTS CPR and Intubation  Palliative  Care to sign off. Please call if any changes

## 2019-03-21 NOTE — PROGRESS NOTE ADULT - PROBLEM SELECTOR PLAN 4
Assist with care
Assist with care
O2 support
monitor, Lasix held
patient c/o chest pain  EKG ordered, no changes   S/s not consistent with PE as wells score/EKG normal   Pt given pantoprazole IV and symptoms improved rapidly consistent with GERD  Plan discussed with Eyal JONES.

## 2019-03-21 NOTE — PROGRESS NOTE ADULT - SUBJECTIVE AND OBJECTIVE BOX
CC:  follow up GOC  INTERVAL HPI/OVERNIGHT EVENTS:    PRESENT SYMPTOMS: SOURCE:  Patient/Family/Team    PAIN SCALE:  0 = none  1 = mild   2 = moderate  3 = severe    Pain: denies    Dyspnea:  [ ] YES [x ] NO  Anxiety:  [ ] YES [ x] NO  Fatigue: [x ] YES [ ] NO  Nausea: [ ] YES [ ]x NO  Loss of Appetite: [x ] YES [ ] NO  Other symptoms: __________    MEDICATIONS  (STANDING):  ALBUTerol    0.083% 10 milliGRAM(s) Nebulizer once  amLODIPine   Tablet 5 milliGRAM(s) Oral daily  ascorbic acid 500 milliGRAM(s) Oral daily  aspirin  chewable 81 milliGRAM(s) Oral daily  atorvastatin 80 milliGRAM(s) Oral at bedtime  carvedilol 6.25 milliGRAM(s) Oral every 12 hours  dextrose 5%. 1000 milliLiter(s) (50 mL/Hr) IV Continuous <Continuous>  dextrose 50% Injectable 12.5 Gram(s) IV Push once  dextrose 50% Injectable 25 Gram(s) IV Push once  dextrose 50% Injectable 25 Gram(s) IV Push once  docusate sodium 100 milliGRAM(s) Oral two times a day  DULoxetine 60 milliGRAM(s) Oral daily  enoxaparin Injectable 40 milliGRAM(s) SubCutaneous daily  epoetin sara Injectable 01500 Unit(s) SubCutaneous <User Schedule>  gabapentin 100 milliGRAM(s) Oral three times a day  insulin glargine Injectable (LANTUS) 30 Unit(s) SubCutaneous at bedtime  insulin lispro (HumaLOG) corrective regimen sliding scale   SubCutaneous Before meals and at bedtime  insulin lispro Injectable (HumaLOG) 5 Unit(s) SubCutaneous three times a day before meals  isosorbide   mononitrate ER Tablet (IMDUR) 60 milliGRAM(s) Oral daily  losartan 25 milliGRAM(s) Oral daily  magnesium oxide 400 milliGRAM(s) Oral daily  methylPREDNISolone sodium succinate Injectable 40 milliGRAM(s) IV Push every 12 hours  Nephro-riya 1 Tablet(s) Oral daily  pantoprazole    Tablet 40 milliGRAM(s) Oral before breakfast  polyethylene glycol 3350 17 Gram(s) Oral daily  senna 2 Tablet(s) Oral at bedtime  tamsulosin 0.4 milliGRAM(s) Oral at bedtime  torsemide 40 milliGRAM(s) Oral two times a day    MEDICATIONS  (PRN):  acetaminophen   Tablet .. 650 milliGRAM(s) Oral every 6 hours PRN Mild Pain (1 - 3)  ALBUTerol/ipratropium for Nebulization 3 milliLiter(s) Nebulizer every 6 hours PRN Shortness of Breath and/or Wheezing  dextrose 40% Gel 15 Gram(s) Oral once PRN Blood Glucose LESS THAN 70 milliGRAM(s)/deciliter  glucagon  Injectable 1 milliGRAM(s) IntraMuscular once PRN Glucose LESS THAN 70 milligrams/deciliter  glycopyrrolate Injectable 0.1 milliGRAM(s) IV Push every 8 hours PRN secreations  guaiFENesin    Syrup 100 milliGRAM(s) Oral every 6 hours PRN Cough  hydrALAZINE Injectable 10 milliGRAM(s) IV Push every 4 hours PRN sbp> 180  ondansetron Injectable 4 milliGRAM(s) IV Push every 6 hours PRN Nausea and/or Vomiting      Allergies    Accupril (Other)    Intolerances        Karnofsky Performance Score/Palliative Performance Status Version 2:         %    Vital Signs Last 24 Hrs  T(C): 36.7 (21 Mar 2019 09:50), Max: 36.9 (20 Mar 2019 15:22)  T(F): 98 (21 Mar 2019 09:50), Max: 98.5 (20 Mar 2019 15:22)  HR: 70 (21 Mar 2019 09:50) (68 - 74)  BP: 93/55 (21 Mar 2019 09:50) (93/55 - 144/64)  BP(mean): --  RR: 18 (21 Mar 2019 09:50) (16 - 18)  SpO2: 94% (21 Mar 2019 05:03) (91% - 94%)    PHYSICAL EXAM:    General: Obese woman, NAD AO    HEENT: [x ] normal  [ ] dry mouth  [ ] ET tube/trach    Lungs: [ x] comfortable [ ] tachypnea/labored breathing  [ ] excessive secretions    CV: [ x] normal  [ ] tachycardia    GI: [x normal  [ ] distended  [ ] tender  [ ] no BS               [ ] PEG/NG/OG tube    : [ x] normal  [ ] incontinent  [ ] oliguria/anuria  [ ] fontenot    MSK: [ ] normal  [ x] weakness  [ ] edema             [ ] ambulatory  [ ]x bedbound/chair bound    Skin: [ ] normal  [ ] pressure ulcers- Stage_____  [x ] no rash    LABS:                        8.0    5.6   )-----------( 125      ( 21 Mar 2019 05:53 )             23.8     03-21    144  |  100  |  71.0<H>  ----------------------------<  106  4.5   |  33.0<H>  |  1.12    Ca    8.5<L>      21 Mar 2019 05:53  Phos  2.1     03-21  Mg     2.2     03-21          I&O's Summary    20 Mar 2019 07:01  -  21 Mar 2019 07:00  --------------------------------------------------------  IN: 700 mL / OUT: 1350 mL / NET: -650 mL    21 Mar 2019 07:01  -  21 Mar 2019 12:45  --------------------------------------------------------  IN: 0 mL / OUT: 150 mL / NET: -150 mL        RADIOLOGY & ADDITIONAL STUDIES:      ADVANCE DIRECTIVES:  [ ] YES [ ] NO    DNR: [ ] YES [ ] NO      Date Completed:                    SILVIA [ ] YES [ ] NO   Date Completed:     COUNSELING:  Advanced Care Planning Discussion - Time Spent ______Minutes.   More than 50% time spent in counseling and coordinating care. ______ Minutes.     Thank you for the opportunity to assist with the care of this patient.   [facility] Consult [facility] [phone].

## 2019-03-21 NOTE — PROGRESS NOTE ADULT - ASSESSMENT
62 yo presenting to ED w/ SOB 2/2 diastolic HF exacerbation and noted to have KEITH and hyperkalemia, PMH CAD s/p CABG and PCI, PAD s/p baloon angioplasty, DM2, HTN, HLD, hx of cardiac arrest, recent GI bleed last month, Diastolic CHF, legally blind, pleural effusion requiring thoracentesis in Dec 2018, admitted for SOB, noted to have pleural effusion, started on bumex, s/p RRT noted on 3/1 for hypotension & lethargy. Pt became hypotensive after Bumex with albumin was given.  CXR with worsening plum edema b/l. BiPAP placed. ICU consult called for vasopressor support during IV diuresis. Midodrine ordered. She became increasingly lethargic with ABG showing resp acidosis with hypoxia. Pulmonary team has been following, she was transferred in ICU as she was having worsening respiratory failure, in ICU she was given High dose steroids as there is possibility of cryptogenic Pneumonia and she was also continued with IV lasix, she improved and was weaned off BiPAP and transfered under Hospitalist service again, her steroids has been tapered down, would require slow taper as per pulmonary, discussed with cardiology will change her lasix 60mg IV BID to torsemide 40mg BID, will wean her off from the supplemental oxygen, she has an episode of hemoptysis early during the course of hospitalization, no more episode, she has UTI and she has completed course of invanz, seen and followed by ID, she has Hx of dysphagia, speech eval appreciated, recommend regular diet with thin liquid, continue with aspiration precautions.   During the course she was also noted to have urine retention, she was given fontenot's cathter and was started on flomax, there was some blood in the urine as well, she was seen by urology, UA was unremarkable, she was given voiding trial and she passed and is able to void, she has no more hematuria.     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 Moderate to Severe TR, mod-severe PHTN, Cardio saw the patient, CXR repeated looked like worsening of effusion, she required 100% oxygen, due to worsening respiratory failure, ICU was consulted and she was taken to the ICU she was continued initially with BiPAP and Lasix and they added high dose steriods, as there was ? cryptogenic organizing PNA, she improved and was weaned off BiPAP and transferred under Hospitalist service, she on solumedrole 40mg Q8 hours, tapering down slowly, pulmonary is following, chnaged to 40 Q12h, she appears to be doing better now that steroids have been restarted and is on being, tolerating NC during the day, Tolerating PO diet, discussed with Dr Peraza changed her lasix 60mg IV BID, to 40mg torsamide BID, OOB to chair. BIPAP nocturnally, will wean off  supplemental oxygen, will get PT eval again       > Hemoptysis:   per pt present for several days, no recurrence noted on 3/13, ? related to superficial lac in trachea from use of around the clock o2  h/h stable, resume asa + dvt ppx for now, serial h/h, repeat ct chest reviewed, no acute findings that require intervention noted  pulm fu appreciated, no more episode.     > Acute on Chronic Hypercapnic Respiratory Failure due to OHS/KYREE.   stable, Pulm recommend chronic NIV for chronic hypercarbic respiratory failure to improve symptoms and to decrease hospitalizations  c/w Bipap as needed and nocturnal, Eventual SINGH with NIV, c/w steroid taper to PO, avoid Narcotics for pain, improving now, will wean off supplemental oxygen    > RT PTX  - small lesion, noted on CT  - sp chest tube placement + removal  - resolved    >UTI - appreciate ID input, sp completed course of invanz, resolved    >Dysphagia - had a RR due to aspiration, speech eval appreciated - recommend regular diet with thin liquid, aspiration precautions    >Chest pain with hx of CAD & CABG/PCI-  sp PRBC to improve oxygen to myocardium  c/w Imdur, BB, statin  ASA as above   Plavix on hold since last admission due to GI Bleed.     > Lower GI Bleed likely diverticular:   - s/p 1 unit of PRBC, well tolerated, uncomplicated  - s/p Colonoscopy - Poor prep throughout the colon. Diverticulosis seen. Unable to visualize colon sufficiently  No further GI w/u recommended,   cleared by GI to use asa but current use as above  Plavix yet on hold, outpt F/U    >Hyperkalemia  - resolved  - Holding Losartan, resume as needed for HF + bp control    > Acute on chronic kidney injury - resolved  appreciate renal input - - cont Torsemide ==> will need to tolerate azotemia,   resumed small dose of losartan, repeat BMP is stable   f/u BMP     > CAD complicated by stents in 2014 / PAD / HLD / HTN  - Continue, Imdur, Lipitor, Coreg and Norvasc   - Plavix on hold since last admission due to GI Bleed.     > DM 2 not on long term insulin complicated by asymptomatic hyperglycemia  - HbA1c 5.6, goal < 8, her lantus was increase to 30units and on humlog 5 along with sliding scale, previous she was on   - Lantus at 10 units at bedtime plus Humalog 3U, it was increase due to high dose steriods, will decrease with tappering down of steriods.   - c/w moderate sliding scale    > Anemia  - Multifactorial (chronic, complicated with acute gi bleeding during admit)  - s/p Venofer   - Continue Procrit  + stool for occult blood (will need GI work-up as outpt)  - Monitor CBC  - per renal goal hgb 10.     > Transaminitis- resolved  - Likely secondary to liver congestion    > History of Depression  - chronic, stable, uncomplicated  - Continue Duloxetine 60 mg    >B/L UE swelling  - Arm elevation  - Warm compresses   - No DVT, resolved.      >Obesity in setting of Acute Moderate Protein Calorie Malnutrition  bmi 36.1  Nepro TID  Neprovite and Vit C 500mg daily  daily wt.     hematuria: Urology consulted, patient has fontenot's cathter, hematuria resolved, she passed voiding trial,     DVT Prophylaxis -- hold in light of hemoptysis, may resume depending on outcome of w/u    >Goals of care - palliative on board, she is full code.     prognosis guarded, case discussed w/ palliative    >dispo. PT eval appreciated, transfer to [facility] when medically stable

## 2019-03-21 NOTE — PROGRESS NOTE ADULT - PROBLEM SELECTOR PROBLEM 4
Functional quadriplegia
Functional quadriplegia
KEITH (acute kidney injury)
Obesity hypoventilation syndrome
Chest pain

## 2019-03-21 NOTE — PROGRESS NOTE ADULT - SUBJECTIVE AND OBJECTIVE BOX
KUN ROCHA    [id number]    63y      Female    Patient is a 63y old  Female who presents with a chief complaint of SOB (21 Mar 2019 08:51)      INTERVAL HPI/OVERNIGHT EVENTS:    Patient is feeling much better off BIPAP on nasal cannula, she is sitting on the chair having her lunch, he oxygen is being weaned off, she denies any fever, chills, hest pain, nausea, vomiting, dizziness    REVIEW OF SYSTEMS:    CONSTITUTIONAL: No fever, some fatigue  RESPIRATORY: No cough, shortness of breath much better.  CARDIOVASCULAR: No chest pain, palpitations  GASTROINTESTINAL: No abdominal, No nausea, vomiting  NEUROLOGICAL: No headaches,  loss of strength.  MISCELLANEOUS: No joint swelling or pain      Vital Signs Last 24 Hrs  T(C): 36.7 (21 Mar 2019 09:50), Max: 36.9 (20 Mar 2019 15:22)  T(F): 98 (21 Mar 2019 09:50), Max: 98.5 (20 Mar 2019 15:22)  HR: 70 (21 Mar 2019 09:50) (68 - 74)  BP: 93/55 (21 Mar 2019 09:50) (93/55 - 144/64)  RR: 18 (21 Mar 2019 09:50) (16 - 18)  SpO2: 94% (21 Mar 2019 05:03) (91% - 94%)    PHYSICAL EXAM:    GENERAL: Middle age male looking comfortable   HEENT: Legally blind, nasal cannula on  NECK: soft, Supple, No JVD,   CHEST/LUNG: decrease air entry bilaterally; mostly decrease right lower, No wheezing  HEART: S1S2+, Regular rate and rhythm; No murmurs  ABDOMEN: Soft, Nontender, Nondistended; Bowel sounds present  EXTREMITIES:  1+ Peripheral Pulses, has edema  SKIN: No rashes or lesions  NEURO: AAOX3, no focal deficits, no motor r sensory loss  PSYCH: normal mood        LABS:                        8.0    5.6   )-----------( 125      ( 21 Mar 2019 05:53 )             23.8     03-21    144  |  100  |  71.0<H>  ----------------------------<  106  4.5   |  33.0<H>  |  1.12    Ca    8.5<L>      21 Mar 2019 05:53  Phos  2.1     03-21  Mg     2.2     03-21              I&O's Summary    20 Mar 2019 07:01  -  21 Mar 2019 07:00  --------------------------------------------------------  IN: 700 mL / OUT: 1350 mL / NET: -650 mL    21 Mar 2019 07:01  -  21 Mar 2019 11:49  --------------------------------------------------------  IN: 0 mL / OUT: 150 mL / NET: -150 mL        MEDICATIONS  (STANDING):  ALBUTerol    0.083% 10 milliGRAM(s) Nebulizer once  amLODIPine   Tablet 5 milliGRAM(s) Oral daily  ascorbic acid 500 milliGRAM(s) Oral daily  aspirin  chewable 81 milliGRAM(s) Oral daily  atorvastatin 80 milliGRAM(s) Oral at bedtime  carvedilol 6.25 milliGRAM(s) Oral every 12 hours  dextrose 5%. 1000 milliLiter(s) (50 mL/Hr) IV Continuous <Continuous>  dextrose 50% Injectable 12.5 Gram(s) IV Push once  dextrose 50% Injectable 25 Gram(s) IV Push once  dextrose 50% Injectable 25 Gram(s) IV Push once  docusate sodium 100 milliGRAM(s) Oral two times a day  DULoxetine 60 milliGRAM(s) Oral daily  enoxaparin Injectable 40 milliGRAM(s) SubCutaneous daily  epoetin sara Injectable 09984 Unit(s) SubCutaneous <User Schedule>  furosemide   Injectable 60 milliGRAM(s) IV Push every 12 hours  gabapentin 100 milliGRAM(s) Oral three times a day  insulin glargine Injectable (LANTUS) 30 Unit(s) SubCutaneous at bedtime  insulin lispro (HumaLOG) corrective regimen sliding scale   SubCutaneous Before meals and at bedtime  insulin lispro Injectable (HumaLOG) 5 Unit(s) SubCutaneous three times a day before meals  isosorbide   mononitrate ER Tablet (IMDUR) 60 milliGRAM(s) Oral daily  losartan 25 milliGRAM(s) Oral daily  magnesium oxide 400 milliGRAM(s) Oral daily  methylPREDNISolone sodium succinate Injectable 40 milliGRAM(s) IV Push every 12 hours  Nephro-riya 1 Tablet(s) Oral daily  pantoprazole    Tablet 40 milliGRAM(s) Oral before breakfast  polyethylene glycol 3350 17 Gram(s) Oral daily  senna 2 Tablet(s) Oral at bedtime  tamsulosin 0.4 milliGRAM(s) Oral at bedtime    MEDICATIONS  (PRN):  acetaminophen   Tablet .. 650 milliGRAM(s) Oral every 6 hours PRN Mild Pain (1 - 3)  ALBUTerol/ipratropium for Nebulization 3 milliLiter(s) Nebulizer every 6 hours PRN Shortness of Breath and/or Wheezing  dextrose 40% Gel 15 Gram(s) Oral once PRN Blood Glucose LESS THAN 70 milliGRAM(s)/deciliter  glucagon  Injectable 1 milliGRAM(s) IntraMuscular once PRN Glucose LESS THAN 70 milligrams/deciliter  glycopyrrolate Injectable 0.1 milliGRAM(s) IV Push every 8 hours PRN secreations  guaiFENesin    Syrup 100 milliGRAM(s) Oral every 6 hours PRN Cough  hydrALAZINE Injectable 10 milliGRAM(s) IV Push every 4 hours PRN sbp> 180  ondansetron Injectable 4 milliGRAM(s) IV Push every 6 hours PRN Nausea and/or Vomiting

## 2019-03-21 NOTE — PROGRESS NOTE ADULT - SUBJECTIVE AND OBJECTIVE BOX
NEPHROLOGY INTERVAL HPI/OVERNIGHT EVENTS:    Examined earlier    MEDICATIONS  (STANDING):  ALBUTerol    0.083% 10 milliGRAM(s) Nebulizer once  amLODIPine   Tablet 5 milliGRAM(s) Oral daily  ascorbic acid 500 milliGRAM(s) Oral daily  aspirin  chewable 81 milliGRAM(s) Oral daily  atorvastatin 80 milliGRAM(s) Oral at bedtime  carvedilol 6.25 milliGRAM(s) Oral every 12 hours  dextrose 5%. 1000 milliLiter(s) (50 mL/Hr) IV Continuous <Continuous>  dextrose 50% Injectable 12.5 Gram(s) IV Push once  dextrose 50% Injectable 25 Gram(s) IV Push once  dextrose 50% Injectable 25 Gram(s) IV Push once  docusate sodium 100 milliGRAM(s) Oral two times a day  DULoxetine 60 milliGRAM(s) Oral daily  enoxaparin Injectable 40 milliGRAM(s) SubCutaneous daily  epoetin sara Injectable 77988 Unit(s) SubCutaneous <User Schedule>  furosemide   Injectable 60 milliGRAM(s) IV Push every 12 hours  gabapentin 100 milliGRAM(s) Oral three times a day  insulin glargine Injectable (LANTUS) 30 Unit(s) SubCutaneous at bedtime  insulin lispro (HumaLOG) corrective regimen sliding scale   SubCutaneous Before meals and at bedtime  insulin lispro Injectable (HumaLOG) 5 Unit(s) SubCutaneous three times a day before meals  isosorbide   mononitrate ER Tablet (IMDUR) 60 milliGRAM(s) Oral daily  losartan 25 milliGRAM(s) Oral daily  magnesium oxide 400 milliGRAM(s) Oral daily  methylPREDNISolone sodium succinate Injectable 40 milliGRAM(s) IV Push every 12 hours  Nephro-riya 1 Tablet(s) Oral daily  pantoprazole    Tablet 40 milliGRAM(s) Oral before breakfast  polyethylene glycol 3350 17 Gram(s) Oral daily  senna 2 Tablet(s) Oral at bedtime  tamsulosin 0.4 milliGRAM(s) Oral at bedtime    MEDICATIONS  (PRN):  acetaminophen   Tablet .. 650 milliGRAM(s) Oral every 6 hours PRN Mild Pain (1 - 3)  ALBUTerol/ipratropium for Nebulization 3 milliLiter(s) Nebulizer every 6 hours PRN Shortness of Breath and/or Wheezing  dextrose 40% Gel 15 Gram(s) Oral once PRN Blood Glucose LESS THAN 70 milliGRAM(s)/deciliter  glucagon  Injectable 1 milliGRAM(s) IntraMuscular once PRN Glucose LESS THAN 70 milligrams/deciliter  glycopyrrolate Injectable 0.1 milliGRAM(s) IV Push every 8 hours PRN secreations  guaiFENesin    Syrup 100 milliGRAM(s) Oral every 6 hours PRN Cough  hydrALAZINE Injectable 10 milliGRAM(s) IV Push every 4 hours PRN sbp> 180  ondansetron Injectable 4 milliGRAM(s) IV Push every 6 hours PRN Nausea and/or Vomiting      Allergies    Accupril (Other)    Intolerances        Vital Signs Last 24 Hrs  T(C): 36.6 (21 Mar 2019 15:25), Max: 36.9 (20 Mar 2019 21:14)  T(F): 97.9 (21 Mar 2019 15:25), Max: 98.4 (20 Mar 2019 21:14)  HR: 78 (21 Mar 2019 15:38) (68 - 78)  BP: 110/58 (21 Mar 2019 15:25) (93/55 - 144/64)  BP(mean): --  RR: 18 (21 Mar 2019 15:25) (16 - 19)  SpO2: 89% (21 Mar 2019 15:38) (87% - 94%)  Daily     Daily Weight in k (21 Mar 2019 05:06)    PHYSICAL EXAM:  Cardiovascular: Normal S1 S2, No rub  Respiratory: Clear lungs with decreased BS at bases	  Gastrointestinal:  Soft, Non-tender, + BS	  Extremities: + LE edema  Still with fontenot , clearing       LABS:                        8.0    5.6   )-----------( 125      ( 21 Mar 2019 05:53 )             23.8         144  |  100  |  71.0<H>  ----------------------------<  106  4.5   |  33.0<H>  |  1.12    Ca    8.5<L>      21 Mar 2019 05:53  Phos  2.1       Mg     2.2               Magnesium, Serum: 2.2 mg/dL ( @ 05:53)  Phosphorus Level, Serum: 2.1 mg/dL ( @ 05:53)          RADIOLOGY & ADDITIONAL TESTS:

## 2019-03-21 NOTE — PROGRESS NOTE ADULT - PROBLEM SELECTOR PROBLEM 2
Acute on chronic diastolic (congestive) heart failure
Acute respiratory failure with hypercapnia
CAD (coronary artery disease)
Acute decompensated heart failure
Acute on chronic congestive heart failure, unspecified heart failure type
Acute on chronic diastolic (congestive) heart failure
Acute on chronic diastolic (congestive) heart failure
Acute respiratory failure with hypercapnia
Obesity hypoventilation syndrome
Obesity hypoventilation syndrome
Acute on chronic diastolic (congestive) heart failure
Obesity hypoventilation syndrome
Acute on chronic congestive heart failure, unspecified heart failure type
Failure to thrive in adult

## 2019-03-21 NOTE — PROGRESS NOTE ADULT - ASSESSMENT
Improved KEITH on CKD stage III in setting of decompensated CHF (R > L)  cr 1.1 today  Elevated BUN also due to steroids  1.1g proteinuria  Agree w Lasix  ==> will need to tolerate azotemia    Anemia: +CKD +GIB  s/p IV Fe==> Tsat=27%  Continue Epogen     Will follow

## 2019-03-21 NOTE — PROGRESS NOTE ADULT - PROBLEM SELECTOR PROBLEM 3
KEITH (acute kidney injury)
Hypertension
Cryptogenic organizing pneumonia
Pleural effusion
Prophylactic measure
Failure to thrive in adult

## 2019-03-21 NOTE — PROGRESS NOTE ADULT - ASSESSMENT
64 yo female with resolved urinary retention  - cont present care as per medicine  - no further urologic intervention required

## 2019-03-21 NOTE — PROGRESS NOTE ADULT - PROBLEM SELECTOR PLAN 3
Well controlled  Cont current regimen
+ CT  Pleurex?
Cont CT given continued drainage - CTS following
Cont steroids
Lasix held  2/2 to renal functions  IR consulted for pigtail
s/p pigtail
s/p pigtail  monitor
History of GIB, Hgb stable, plt stable this admission  High risk for DVT given lethargy and immobility  heparin 5000 units SQ q8h started
Patient need course of inpatient rehab  PT evaluation

## 2019-03-22 LAB
ANION GAP SERPL CALC-SCNC: 8 MMOL/L — SIGNIFICANT CHANGE UP (ref 5–17)
BUN SERPL-MCNC: 72 MG/DL — HIGH (ref 8–20)
CALCIUM SERPL-MCNC: 8.5 MG/DL — LOW (ref 8.6–10.2)
CHLORIDE SERPL-SCNC: 99 MMOL/L — SIGNIFICANT CHANGE UP (ref 98–107)
CO2 SERPL-SCNC: 35 MMOL/L — HIGH (ref 22–29)
CREAT SERPL-MCNC: 1.24 MG/DL — SIGNIFICANT CHANGE UP (ref 0.5–1.3)
GLUCOSE BLDC GLUCOMTR-MCNC: 164 MG/DL — HIGH (ref 70–99)
GLUCOSE BLDC GLUCOMTR-MCNC: 186 MG/DL — HIGH (ref 70–99)
GLUCOSE BLDC GLUCOMTR-MCNC: 273 MG/DL — HIGH (ref 70–99)
GLUCOSE BLDC GLUCOMTR-MCNC: 97 MG/DL — SIGNIFICANT CHANGE UP (ref 70–99)
GLUCOSE SERPL-MCNC: 73 MG/DL — SIGNIFICANT CHANGE UP (ref 70–115)
HCT VFR BLD CALC: 26.1 % — LOW (ref 37–47)
HGB BLD-MCNC: 8.8 G/DL — LOW (ref 12–16)
MCHC RBC-ENTMCNC: 30 PG — SIGNIFICANT CHANGE UP (ref 27–31)
MCHC RBC-ENTMCNC: 33.7 G/DL — SIGNIFICANT CHANGE UP (ref 32–36)
MCV RBC AUTO: 89.1 FL — SIGNIFICANT CHANGE UP (ref 81–99)
NT-PROBNP SERPL-SCNC: 8357 PG/ML — HIGH (ref 0–300)
PLATELET # BLD AUTO: 136 K/UL — LOW (ref 150–400)
POTASSIUM SERPL-MCNC: 4.2 MMOL/L — SIGNIFICANT CHANGE UP (ref 3.5–5.3)
POTASSIUM SERPL-SCNC: 4.2 MMOL/L — SIGNIFICANT CHANGE UP (ref 3.5–5.3)
RBC # BLD: 2.93 M/UL — LOW (ref 4.4–5.2)
RBC # FLD: 18.4 % — HIGH (ref 11–15.6)
SODIUM SERPL-SCNC: 142 MMOL/L — SIGNIFICANT CHANGE UP (ref 135–145)
WBC # BLD: 6.5 K/UL — SIGNIFICANT CHANGE UP (ref 4.8–10.8)
WBC # FLD AUTO: 6.5 K/UL — SIGNIFICANT CHANGE UP (ref 4.8–10.8)

## 2019-03-22 PROCEDURE — 71250 CT THORAX DX C-: CPT | Mod: 26

## 2019-03-22 PROCEDURE — 99233 SBSQ HOSP IP/OBS HIGH 50: CPT

## 2019-03-22 PROCEDURE — 99232 SBSQ HOSP IP/OBS MODERATE 35: CPT

## 2019-03-22 RX ADMIN — GABAPENTIN 100 MILLIGRAM(S): 400 CAPSULE ORAL at 06:43

## 2019-03-22 RX ADMIN — ATORVASTATIN CALCIUM 80 MILLIGRAM(S): 80 TABLET, FILM COATED ORAL at 22:42

## 2019-03-22 RX ADMIN — AMLODIPINE BESYLATE 5 MILLIGRAM(S): 2.5 TABLET ORAL at 06:41

## 2019-03-22 RX ADMIN — Medication 81 MILLIGRAM(S): at 11:16

## 2019-03-22 RX ADMIN — DULOXETINE HYDROCHLORIDE 60 MILLIGRAM(S): 30 CAPSULE, DELAYED RELEASE ORAL at 13:26

## 2019-03-22 RX ADMIN — PANTOPRAZOLE SODIUM 40 MILLIGRAM(S): 20 TABLET, DELAYED RELEASE ORAL at 06:44

## 2019-03-22 RX ADMIN — Medication 100 MILLIGRAM(S): at 06:42

## 2019-03-22 RX ADMIN — Medication 40 MILLIGRAM(S): at 06:43

## 2019-03-22 RX ADMIN — Medication 100 MILLIGRAM(S): at 17:21

## 2019-03-22 RX ADMIN — Medication 2: at 11:02

## 2019-03-22 RX ADMIN — ENOXAPARIN SODIUM 40 MILLIGRAM(S): 100 INJECTION SUBCUTANEOUS at 22:42

## 2019-03-22 RX ADMIN — POLYETHYLENE GLYCOL 3350 17 GRAM(S): 17 POWDER, FOR SOLUTION ORAL at 22:42

## 2019-03-22 RX ADMIN — TAMSULOSIN HYDROCHLORIDE 0.4 MILLIGRAM(S): 0.4 CAPSULE ORAL at 22:49

## 2019-03-22 RX ADMIN — MAGNESIUM OXIDE 400 MG ORAL TABLET 400 MILLIGRAM(S): 241.3 TABLET ORAL at 11:16

## 2019-03-22 RX ADMIN — CARVEDILOL PHOSPHATE 6.25 MILLIGRAM(S): 80 CAPSULE, EXTENDED RELEASE ORAL at 06:41

## 2019-03-22 RX ADMIN — INSULIN GLARGINE 30 UNIT(S): 100 INJECTION, SOLUTION SUBCUTANEOUS at 22:43

## 2019-03-22 RX ADMIN — Medication 5 UNIT(S): at 11:03

## 2019-03-22 RX ADMIN — LOSARTAN POTASSIUM 25 MILLIGRAM(S): 100 TABLET, FILM COATED ORAL at 06:43

## 2019-03-22 RX ADMIN — GABAPENTIN 100 MILLIGRAM(S): 400 CAPSULE ORAL at 14:10

## 2019-03-22 RX ADMIN — Medication 60 MILLIGRAM(S): at 06:43

## 2019-03-22 RX ADMIN — Medication 2: at 22:43

## 2019-03-22 RX ADMIN — Medication 40 MILLIGRAM(S): at 17:21

## 2019-03-22 RX ADMIN — ISOSORBIDE MONONITRATE 60 MILLIGRAM(S): 60 TABLET, EXTENDED RELEASE ORAL at 13:25

## 2019-03-22 RX ADMIN — Medication 5 UNIT(S): at 07:58

## 2019-03-22 RX ADMIN — Medication 500 MILLIGRAM(S): at 11:16

## 2019-03-22 RX ADMIN — Medication 6: at 17:22

## 2019-03-22 RX ADMIN — Medication 5 UNIT(S): at 17:22

## 2019-03-22 RX ADMIN — Medication 1 TABLET(S): at 11:08

## 2019-03-22 RX ADMIN — SENNA PLUS 2 TABLET(S): 8.6 TABLET ORAL at 22:42

## 2019-03-22 RX ADMIN — GABAPENTIN 100 MILLIGRAM(S): 400 CAPSULE ORAL at 22:42

## 2019-03-22 RX ADMIN — CARVEDILOL PHOSPHATE 6.25 MILLIGRAM(S): 80 CAPSULE, EXTENDED RELEASE ORAL at 17:21

## 2019-03-22 NOTE — PHYSICAL THERAPY INITIAL EVALUATION ADULT - CRITERIA FOR SKILLED THERAPEUTIC INTERVENTIONS
rehab potential/therapy frequency/anticipated discharge recommendation/impairments found/functional limitations in following categories/predicted duration of therapy intervention
functional limitations in following categories/anticipated discharge recommendation/impairments found/therapy frequency/predicted duration of therapy intervention/rehab potential
SINGH/anticipated discharge recommendation/impairments found

## 2019-03-22 NOTE — PHYSICAL THERAPY INITIAL EVALUATION ADULT - DIAGNOSIS, PT EVAL
decreased strength and balance
functional debility 2*2 deconditioning
decreased strength and balance

## 2019-03-22 NOTE — PHYSICAL THERAPY INITIAL EVALUATION ADULT - RANGE OF MOTION EXAMINATION, REHAB EVAL
no ROM deficits were identified
no ROM deficits were identified
bilateral upper extremity ROM was WFL (within functional limits)/bilateral lower extremity ROM was WFL (within functional limits)

## 2019-03-22 NOTE — PROGRESS NOTE ADULT - ASSESSMENT
62 yo presenting to ED w/ SOB 2/2 diastolic HF exacerbation and noted to have KEITH and hyperkalemia, PMH CAD s/p CABG and PCI, PAD s/p baloon angioplasty, DM2, HTN, HLD, hx of cardiac arrest, recent GI bleed last month, Diastolic CHF, legally blind, pleural effusion requiring thoracentesis in Dec 2018, admitted for SOB, noted to have pleural effusion, started on bumex, s/p RRT noted on 3/1 for hypotension & lethargy. Pt became hypotensive after Bumex with albumin was given.  CXR with worsening plum edema b/l. BiPAP placed. ICU consult called for vasopressor support during IV diuresis. Midodrine ordered. She became increasingly lethargic with ABG showing resp acidosis with hypoxia. Pulmonary team has been following, she was transferred in ICU as she was having worsening respiratory failure, in ICU she was given High dose steroids as there is possibility of cryptogenic Pneumonia and she was also continued with IV lasix, she improved and was weaned off BiPAP and transfered under Hospitalist service again, her steroids has been tapered down, would require slow taper as per pulmonary, discussed with cardiology will change her lasix 60mg IV BID to torsemide 40mg BID, will wean her off from the supplemental oxygen, she has an episode of hemoptysis early during the course of hospitalization, no more episode, she has UTI and she has completed course of invanz, seen and followed by ID, she has Hx of dysphagia, speech eval appreciated, recommend regular diet with thin liquid, continue with aspiration precautions.   During the course she was also noted to have urine retention, she was given fontenot's cathter and was started on flomax, there was some blood in the urine as well, she was seen by urology, UA was unremarkable, she was given voiding trial and she passed and is able to void, she has no more hematuria.     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 Moderate to Severe TR, mod-severe PHTN, Cardio saw the patient, CXR repeated looked like worsening of effusion, she required 100% oxygen, due to worsening respiratory failure, ICU was consulted and she was taken to the ICU she was continued initially with BiPAP and Lasix and they added high dose steriods, as there was ? cryptogenic organizing PNA, she improved and was weaned off BiPAP and transferred under Hospitalist service, she on solumedrole 40mg Q8 hours, tapering down slowly, pulmonary is following, chnaged to 40 Q12h, she appears to be doing better now that steroids have been restarted and is on being, tolerating NC during the day, Tolerating PO diet, discussed with Dr Recio changed her lasix 60mg IV BID, to 40mg torsemide BID, OOB to chair. BIPAP nocturnally, will wean off  supplemental oxygen, will get PT eval again   repeat CT chest ordered     > Hemoptysis:   per pt present for several days, no recurrence noted on 3/13, ? related to superficial lac in trachea from use of around the clock o2  h/h stable, resume asa + dvt ppx for now, serial h/h, repeat ct chest reviewed, no acute findings that require intervention noted  pulm fu appreciated, no more episode.     > Acute on Chronic Hypercapnic Respiratory Failure due to OHS/KYREE.   stable, Pulm recommend chronic NIV for chronic hypercarbic respiratory failure to improve symptoms and to decrease hospitalizations  c/w Bipap as needed and nocturnal, Eventual SINGH with NIV, c/w steroid taper to PO, avoid Narcotics for pain, improving now, will wean off supplemental oxygen    > RT PTX  - small lesion, noted on CT  - sp chest tube placement + removal  - resolved    >UTI - appreciate ID input, sp completed course of invanz, resolved    >Dysphagia - had RRT due to aspiration, speech eval appreciated - recommend regular diet with thin liquid, aspiration precautions    >Chest pain with hx of CAD & CABG/PCI-  sp PRBC to improve oxygen to myocardium  c/w Imdur, BB, statin  ASA as above   Plavix on hold since last admission due to GI Bleed.     > Lower GI Bleed likely diverticular:   - s/p 1 unit of PRBC, well tolerated, uncomplicated  - s/p Colonoscopy - Poor prep throughout the colon. Diverticulosis seen. Unable to visualize colon sufficiently  No further GI w/u recommended,   cleared by GI to use asa but current use as above  Plavix yet on hold, outpt F/U    >Hyperkalemia  - resolved  - Holding Losartan, resume as needed for HF + bp control    > Acute on chronic kidney injury - resolved  appreciate renal input - - cont Torsemide ==> will need to tolerate azotemia,   resumed small dose of losartan, repeat BMP is stable   f/u BMP     > CAD complicated by stents in 2014 / PAD / HLD / HTN  - Continue, Imdur, Lipitor, Coreg and Norvasc   - Plavix on hold since last admission due to GI Bleed.     > DM 2 not on long term insulin complicated by asymptomatic hyperglycemia  - HbA1c 5.6, goal < 8, her lantus was increase to 30units and on humlog 5 along with sliding scale, previous she was on   - Lantus at 10 units at bedtime plus Humalog 3U, it was increase due to high dose steriods, will decrease with tappering down of steriods.   - c/w moderate sliding scale    > Anemia  - Multifactorial (chronic, complicated with acute gi bleeding during admit)  - s/p Venofer   - Continue Procrit  + stool for occult blood (will need GI work-up as outpt)  - Monitor CBC  - per renal goal hgb 10.     > Transaminitis- resolved  - Likely secondary to liver congestion    > History of Depression  - chronic, stable, uncomplicated  - Continue Duloxetine 60 mg    >B/L UE swelling  - Arm elevation  - Warm compresses   - No DVT, resolved.      >Obesity in setting of Acute Moderate Protein Calorie Malnutrition  bmi 36.1  Nepro TID  Neprovite and Vit C 500mg daily  daily wt.     hematuria: Urology consulted, patient has fontenot's catheter, hematuria resolved, she passed voiding trial,     DVT Prophylaxis -- hold in light of hemoptysis, may resume depending on outcome of w/u    >Goals of care - palliative on board, she is full code.     prognosis guarded, case discussed w/ palliative    >dispo. PT eval appreciated, ultimately SINGH

## 2019-03-22 NOTE — PHYSICAL THERAPY INITIAL EVALUATION ADULT - PLANNED THERAPY INTERVENTIONS, PT EVAL
gait training/strengthening/transfer training/balance training/bed mobility training
gait training/strengthening/transfer training/balance training/bed mobility training
wheelchair management/propulsion training/strengthening/balance training/gait training/bed mobility training/transfer training

## 2019-03-22 NOTE — PHYSICAL THERAPY INITIAL EVALUATION ADULT - ADDITIONAL COMMENTS
as per pt: owns and uses a RW, states walks around home independently at times, has 3 steps 1 rail to enter home, receives assist prn
as per pt: owns and uses a RW, states walks around home independently at times, has 3 steps 1 rail to enter home, receives assist prn
Pt lives in a private home with her spouse and son. 15 steps to enter with handrails, no steps inside. Pt was ambulatory PTA with assist device. Pt owns RW and SAC.

## 2019-03-22 NOTE — PHYSICAL THERAPY INITIAL EVALUATION ADULT - PERTINENT HX OF CURRENT PROBLEM, REHAB EVAL
pt presents to [facility] due to CHF, hx of vision deficits
63 c/o SOB 2/2 diastolic HF exacerbation and KEITH and hyperkalemia, PMH CAD s/p CABG and PCI, PAD s/p baloon angioplasty, DM2, HTN, HLD, hx of cardiac arrest, recent GIB, Diastolic CHF, legally blind, pleural effusion requiring thoracentesis 12/18 p/w SOB, s/p RRT noted on 3/1 for hypotension & lethargy. Admitted for Acute on Chronic Diastolic Heart Failure with severe PHTN  & right ventricular failure from OHS/ KYREE complicated by pulm edema & pleural effusions
pt presents to [facility] due to CHF, lower GI bleed, encephalopathy, rapid response 3/1 for hypotension, pt medically upgraded for safety,

## 2019-03-22 NOTE — PROGRESS NOTE ADULT - SUBJECTIVE AND OBJECTIVE BOX
PULMONARY PROGRESS NOTE      RANJIT [id number]    Patient is a 63y old  Female who presents with a chief complaint of SOB (22 Mar 2019 12:21)  Hypoxic respiratory failure  CHF  Functional Quadriplegia  Pleural effusion  Presumed     INTERVAL HPI/OVERNIGHT EVENTS:  New plateau on steroid RX    MEDICATIONS  (STANDING):  ALBUTerol    0.083% 10 milliGRAM(s) Nebulizer once  amLODIPine   Tablet 5 milliGRAM(s) Oral daily  ascorbic acid 500 milliGRAM(s) Oral daily  aspirin  chewable 81 milliGRAM(s) Oral daily  atorvastatin 80 milliGRAM(s) Oral at bedtime  carvedilol 6.25 milliGRAM(s) Oral every 12 hours  dextrose 5%. 1000 milliLiter(s) (50 mL/Hr) IV Continuous <Continuous>  dextrose 50% Injectable 12.5 Gram(s) IV Push once  dextrose 50% Injectable 25 Gram(s) IV Push once  dextrose 50% Injectable 25 Gram(s) IV Push once  docusate sodium 100 milliGRAM(s) Oral two times a day  DULoxetine 60 milliGRAM(s) Oral daily  enoxaparin Injectable 40 milliGRAM(s) SubCutaneous daily  epoetin sara Injectable 43845 Unit(s) SubCutaneous <User Schedule>  gabapentin 100 milliGRAM(s) Oral three times a day  insulin glargine Injectable (LANTUS) 30 Unit(s) SubCutaneous at bedtime  insulin lispro (HumaLOG) corrective regimen sliding scale   SubCutaneous Before meals and at bedtime  insulin lispro Injectable (HumaLOG) 5 Unit(s) SubCutaneous three times a day before meals  isosorbide   mononitrate ER Tablet (IMDUR) 60 milliGRAM(s) Oral daily  losartan 25 milliGRAM(s) Oral daily  magnesium oxide 400 milliGRAM(s) Oral daily  methylPREDNISolone sodium succinate Injectable 40 milliGRAM(s) IV Push every 12 hours  Nephro-riya 1 Tablet(s) Oral daily  pantoprazole    Tablet 40 milliGRAM(s) Oral before breakfast  polyethylene glycol 3350 17 Gram(s) Oral daily  senna 2 Tablet(s) Oral at bedtime  tamsulosin 0.4 milliGRAM(s) Oral at bedtime  torsemide 40 milliGRAM(s) Oral two times a day      MEDICATIONS  (PRN):  acetaminophen   Tablet .. 650 milliGRAM(s) Oral every 6 hours PRN Mild Pain (1 - 3)  ALBUTerol/ipratropium for Nebulization 3 milliLiter(s) Nebulizer every 6 hours PRN Shortness of Breath and/or Wheezing  dextrose 40% Gel 15 Gram(s) Oral once PRN Blood Glucose LESS THAN 70 milliGRAM(s)/deciliter  glucagon  Injectable 1 milliGRAM(s) IntraMuscular once PRN Glucose LESS THAN 70 milligrams/deciliter  glycopyrrolate Injectable 0.1 milliGRAM(s) IV Push every 8 hours PRN secreations  guaiFENesin    Syrup 100 milliGRAM(s) Oral every 6 hours PRN Cough  hydrALAZINE Injectable 10 milliGRAM(s) IV Push every 4 hours PRN sbp> 180  ondansetron Injectable 4 milliGRAM(s) IV Push every 6 hours PRN Nausea and/or Vomiting      Allergies    Accupril (Other)    Intolerances        PAST MEDICAL & SURGICAL HISTORY:  Herniated disc, cervical  PAD (peripheral artery disease)  Legally blind  History of peripheral vascular disease  History of retinal detachment  History of CEA (carotid endarterectomy): Right  Hyperlipidemia  Glaucoma  Hypertension  Diabetes  S/P CABG x 1  After cataract  Uveitic glaucoma of both eyes  Detached retina  Atherosclerosis of right carotid artery   delivery delivered      SOCIAL HISTORY  Smoking History:       REVIEW OF SYSTEMS:    CONSTITUTIONAL:  No distress    HEENT:  Eyes:  No diplopia or blurred vision. ENT:  No earache, sore throat or runny nose.    CARDIOVASCULAR:  No pressure, squeezing, tightness, heaviness or aching about the chest; no palpitations.    RESPIRATORY:  No cough, shortness of breath, PND or orthopnea. Mild SOBOE    GASTROINTESTINAL:  No nausea, vomiting or diarrhea.    GENITOURINARY:  No dysuria, frequency or urgency.      PSYCHIATRIC:  No disorder of thought or mood.    Vital Signs Last 24 Hrs  T(C): 37 (22 Mar 2019 07:47), Max: 37 (22 Mar 2019 07:47)  T(F): 98.6 (22 Mar 2019 07:47), Max: 98.6 (22 Mar 2019 07:47)  HR: 70 (22 Mar 2019 07:47) (67 - 82)  BP: 115/56 (22 Mar 2019 07:47) (99/63 - 138/72)  BP(mean): --  RR: 19 (22 Mar 2019 07:47) (18 - 20)  SpO2: 96% (22 Mar 2019 07:47) (87% - 100%)    PHYSICAL EXAMINATION:    GENERAL: The patient is awake and alert in no apparent distress.     HEENT: Head is normocephalic and atraumatic. Extraocular muscles are intact. Mucous membranes are moist.    NECK: Supple.    LUNGS: Clear to auscultation without wheezing, rales or rhonchi; respirations unlabored    HEART: Regular rate and rhythm without murmur.    ABDOMEN: Soft, nontender, and nondistended.      EXTREMITIES: Without any cyanosis, clubbing, rash, lesions or edema.      LABS:                        8.8    6.5   )-----------( 136      ( 22 Mar 2019 10:13 )             26.1     -    142  |  99  |  72.0<H>  ----------------------------<  73  4.2   |  35.0<H>  |  1.24    Ca    8.5<L>      22 Mar 2019 10:13  Phos  2.1     -  Mg     2.2                       Serum Pro-Brain Natriuretic Peptide: 6562 pg/mL (19 @ 05:53)          MICROBIOLOGY:    RADIOLOGY & ADDITIONAL STUDIES:

## 2019-03-22 NOTE — PROGRESS NOTE ADULT - PROBLEM SELECTOR PLAN 1
patient's symptoms are multifactorial. Difficult to discern. BNP is lower than a few days prior, lungs with no significant crackles.   ? of aspiration. Will repeat chest Ct without contrast.   Change lasix to torsemide 40 mg po bid. (at home was on 20 mg bid).

## 2019-03-22 NOTE — PHYSICAL THERAPY INITIAL EVALUATION ADULT - IMPAIRMENTS FOUND, PT EVAL
aerobic capacity/endurance/muscle strength/gait, locomotion, and balance
muscle strength/gait, locomotion, and balance/aerobic capacity/endurance
ventilation and respiration/gas exchange/gait, locomotion, and balance/muscle strength/aerobic capacity/endurance

## 2019-03-22 NOTE — PHYSICAL THERAPY INITIAL EVALUATION ADULT - LEVEL OF INDEPENDENCE: SUPINE/SIT, REHAB EVAL
moderate assist (50% patients effort)
maximum assist (25% patients effort)
maximum assist (25% patients effort)

## 2019-03-22 NOTE — PROGRESS NOTE ADULT - ASSESSMENT
Assess    Obesity  KYREE  CHF  Pulm HTN  CKD  Presumptive     Rec    Prednisone, 30 mg daily and decrease by 5 mg each month  Cardiac regiment  Nocturnal Bipap  02  Placement?

## 2019-03-22 NOTE — PHYSICAL THERAPY INITIAL EVALUATION ADULT - ASSISTIVE DEVICE FOR TRANSFER: GAIT, REHAB EVAL
Date of Office Visit: 10/08/2018  Encounter Provider: Israel Lanza MD  Place of Service: [facility] CARD[facility]  Patient Name: Ayla Castellano  :1941    Chief complaint: Follow-up for syncope secondary to medication induced bradycardia,  hypertension.     History of Present Illness:    Dear Dr. Morales:      I again had the pleasure of seeing your patient in cardiology office on 10/08/2018.  As   you well know, she is a very pleasant 77 year-old Azeri female who presents for  follow-up. The patient was admitted in 2015 after a syncopal episode. Upon  presentation in the emergency department, she was found to have severe bradycardia   with what appeared to be sinus arrest with junctional escape beats. Her heart rate was   as low as the 20s at times, and she was having very significant pauses. She was   actually given atropine in the emergency room initially. She was on both clonidine and   metoprolol at the time and these were discontinued. She was briefly placed on a   Dopamine drip, and her heart rate actually improved. She ultimately did not require a   permanent pacemaker. An echocardiogram obtained on 2015 showed an   ejection fraction of 60% to 65% and no significant valvular disease. She also has a   history of hypertension,      The patient presents today for follow-up.  Her main issue recently has been pain on   urination, and she is seeing urology for this.  Her blood pressure has been good, and   is 118/62 today.  She has not had any significant chest pain or shortness of breath.    Past Medical History:   Diagnosis Date   • Colon cancer (CMS/HCC)     s/p partial colectomy in    • Diabetes mellitus (CMS/HCC)    • Gallbladder disease    • Hyperlipidemia    • Hypertension    • Left arm pain    • Syncope     Secondary to medications (clonidine and metoprolol) in 3/15.  Had severe bradycardia (sinus arrest with junctional escape beats).  Resolved  after medications discontinued.       Past Surgical History:   Procedure Laterality Date   • COLECTOMY PARTIAL / TOTAL      2011 for colon cancer   • UTERINE FIBROID SURGERY      s/p uterine fibroma resection       Current Outpatient Prescriptions on File Prior to Visit   Medication Sig Dispense Refill   • ACCU-CHEK FASTCLIX LANCETS misc USE TO TEST TID  8   • amLODIPine (NORVASC) 5 MG tablet TAKE 1 TABLET BY MOUTH EVERY DAY 30 tablet 4   • aspirin 81 MG tablet Take by mouth.     • fenofibrate (TRICOR) 145 MG tablet Take 1 tablet by mouth daily.     • isosorbide mononitrate (IMDUR) 60 MG 24 hr tablet Take 1 tablet by mouth Daily. 90 tablet 3   • metFORMIN (GLUCOPHAGE) 500 MG tablet Take 1 tablet by mouth daily.     • ofloxacin (OCUFLOX) 0.3 % ophthalmic solution INSTILL 1 TO 2 GTS AEY Q 2-3 HOURS DURING DAY  1   • ONE TOUCH ULTRA TEST test strip U TO TEST BLOOD SUGAR QD UTD  2   • oxybutynin (DITROPAN) 5 MG tablet TK 1/2 - 1 T PO 1-2 TIMES DAILY  5   • valsartan-hydrochlorothiazide (DIOVAN-HCT) 320-25 MG per tablet Take 1 tablet by mouth daily.       No current facility-administered medications on file prior to visit.      Allergies as of 10/08/2018   • (No Known Allergies)     Social History     Social History   • Marital status:      Spouse name: N/A   • Number of children: N/A   • Years of education: N/A     Occupational History   • Not on file.     Social History Main Topics   • Smoking status: Never Smoker   • Smokeless tobacco: Never Used   • Alcohol use No   • Drug use: No   • Sexual activity: Not on file     Other Topics Concern   • Not on file     Social History Narrative   • No narrative on file     Family History   Problem Relation Age of Onset   • Heart disease Mother         pacemaker placement   • Colon cancer Brother        Review of Systems   Genitourinary: Positive for dysuria.   All other systems reviewed and are negative.     Objective:     Vitals:    10/08/18 1328   BP: 118/62   Pulse: 71  "  SpO2: 99%   Weight: 68.8 kg (151 lb 9.6 oz)   Height: 151 cm (59.45\")     Body mass index is 30.16 kg/m².    Physical Exam   Constitutional: She is oriented to person, place, and time. She appears well-developed and well-nourished.   HENT:   Head: Normocephalic and atraumatic.   Eyes: Conjunctivae are normal.   Neck: Neck supple.   Cardiovascular: Normal rate and regular rhythm.  Exam reveals no gallop and no friction rub.    No murmur heard.  Pulmonary/Chest: Effort normal and breath sounds normal.   Abdominal: Soft. There is no tenderness.   Musculoskeletal: She exhibits no edema.   Neurological: She is alert and oriented to person, place, and time.   Skin: Skin is warm.   Psychiatric: She has a normal mood and affect. Her behavior is normal.     Lab Review:   Procedures    Cardiac Procedures:  1. Echocardiogram on 03/23/2015 revealed an ejection fraction of 60-65%. There   was no significant valvular disease noted.   2. Lexiscan Myoview stress test on 10/05/2015 was normal with no evidence of   ischemia or infarction.     Assessment:       Diagnosis Plan   1. Essential hypertension     2. Adverse effect of beta-blocker, subsequent encounter       Plan:       Other than dysuria, the patient seems to be doing well.  She has not had any chest pain   or shortness of breath.  Her blood pressure has been excellent, and is 118/62 today.    She is going to remain on the amlodipine at 5 mg per day, Imdur 60 mg per day, and   Diovan /25 mg per day.  She will continue on the aspirin empirically.  She has   been on this for many years given her diabetes.  She did have significant bradycardia   with beta blockers in the past, and she is not a good candidate for AV abby blocking   agents or beta blockers in the future.  I will plan on seeing her back in the office in 6   months unless other issues arise.      " rolling walker

## 2019-03-22 NOTE — PHYSICAL THERAPY INITIAL EVALUATION ADULT - LEVEL OF INDEPENDENCE, REHAB EVAL
minimum assist (75% patients effort)
maximum assist (25% patients effort)
minimum assist (75% patients effort)

## 2019-03-22 NOTE — PHYSICAL THERAPY INITIAL EVALUATION ADULT - TRANSFER SAFETY CONCERNS NOTED: SIT/STAND, REHAB EVAL
poor standing balance, max verbal cues for sequencing, unsafe to ambulate at this time
decreased balance during turns/decreased weight-shifting ability

## 2019-03-22 NOTE — PHYSICAL THERAPY INITIAL EVALUATION ADULT - LEVEL OF INDEPENDENCE: STAND/SIT, REHAB EVAL
moderate assist (50% patients effort)
maximum assist (25% patients effort)
moderate assist (50% patients effort)

## 2019-03-22 NOTE — PHYSICAL THERAPY INITIAL EVALUATION ADULT - REHAB POTENTIAL, PT EVAL
good, to achieve stated therapy goals
good, to achieve stated therapy goals
fair, will monitor progress closely

## 2019-03-22 NOTE — PROGRESS NOTE ADULT - SUBJECTIVE AND OBJECTIVE BOX
seen for resp failure    no acute complaints/events  tolerating bipap overnight  ros otherwise negative     MEDICATIONS  (STANDING):  ALBUTerol    0.083% 10 milliGRAM(s) Nebulizer once  amLODIPine   Tablet 5 milliGRAM(s) Oral daily  ascorbic acid 500 milliGRAM(s) Oral daily  aspirin  chewable 81 milliGRAM(s) Oral daily  atorvastatin 80 milliGRAM(s) Oral at bedtime  carvedilol 6.25 milliGRAM(s) Oral every 12 hours  dextrose 5%. 1000 milliLiter(s) (50 mL/Hr) IV Continuous <Continuous>  dextrose 50% Injectable 12.5 Gram(s) IV Push once  dextrose 50% Injectable 25 Gram(s) IV Push once  dextrose 50% Injectable 25 Gram(s) IV Push once  docusate sodium 100 milliGRAM(s) Oral two times a day  DULoxetine 60 milliGRAM(s) Oral daily  enoxaparin Injectable 40 milliGRAM(s) SubCutaneous daily  epoetin sara Injectable 83128 Unit(s) SubCutaneous <User Schedule>  gabapentin 100 milliGRAM(s) Oral three times a day  insulin glargine Injectable (LANTUS) 30 Unit(s) SubCutaneous at bedtime  insulin lispro (HumaLOG) corrective regimen sliding scale   SubCutaneous Before meals and at bedtime  insulin lispro Injectable (HumaLOG) 5 Unit(s) SubCutaneous three times a day before meals  isosorbide   mononitrate ER Tablet (IMDUR) 60 milliGRAM(s) Oral daily  losartan 25 milliGRAM(s) Oral daily  magnesium oxide 400 milliGRAM(s) Oral daily  Nephro-riya 1 Tablet(s) Oral daily  pantoprazole    Tablet 40 milliGRAM(s) Oral before breakfast  polyethylene glycol 3350 17 Gram(s) Oral daily  predniSONE   Tablet 20 milliGRAM(s) Oral daily  senna 2 Tablet(s) Oral at bedtime  tamsulosin 0.4 milliGRAM(s) Oral at bedtime  torsemide 40 milliGRAM(s) Oral two times a day    MEDICATIONS  (PRN):  acetaminophen   Tablet .. 650 milliGRAM(s) Oral every 6 hours PRN Mild Pain (1 - 3)  ALBUTerol/ipratropium for Nebulization 3 milliLiter(s) Nebulizer every 6 hours PRN Shortness of Breath and/or Wheezing  dextrose 40% Gel 15 Gram(s) Oral once PRN Blood Glucose LESS THAN 70 milliGRAM(s)/deciliter  glucagon  Injectable 1 milliGRAM(s) IntraMuscular once PRN Glucose LESS THAN 70 milligrams/deciliter  glycopyrrolate Injectable 0.1 milliGRAM(s) IV Push every 8 hours PRN secreations  guaiFENesin    Syrup 100 milliGRAM(s) Oral every 6 hours PRN Cough  hydrALAZINE Injectable 10 milliGRAM(s) IV Push every 4 hours PRN sbp> 180  ondansetron Injectable 4 milliGRAM(s) IV Push every 6 hours PRN Nausea and/or Vomiting      Allergies    Accupril (Other)      Vital Signs Last 24 Hrs  T(C): 37 (22 Mar 2019 07:47), Max: 37 (22 Mar 2019 07:47)  T(F): 98.6 (22 Mar 2019 07:47), Max: 98.6 (22 Mar 2019 07:47)  HR: 72 (22 Mar 2019 13:23) (67 - 82)  BP: 132/66 (22 Mar 2019 13:23) (99/63 - 138/72)  BP(mean): --  RR: 19 (22 Mar 2019 07:47) (18 - 20)  SpO2: 96% (22 Mar 2019 07:47) (89% - 100%)    PHYSICAL EXAM:    GENERAL: NAD obese   CHEST/LUNG: Clear to percussion bilaterally  HEART: Regular rate and rhythm; S1 S2  ABDOMEN: Soft, ,Bowel sounds present  EXTREMITIES:  no edema   NERVOUS SYSTEM:  Alert & Oriented X3, nonfocal    LABS:                        8.8    6.5   )-----------( 136      ( 22 Mar 2019 10:13 )             26.1     03-22    142  |  99  |  72.0<H>  ----------------------------<  73  4.2   |  35.0<H>  |  1.24    Ca    8.5<L>      22 Mar 2019 10:13  Phos  2.1     03-21  Mg     2.2     03-21            CAPILLARY BLOOD GLUCOSE      POCT Blood Glucose.: 186 mg/dL (22 Mar 2019 10:59)  POCT Blood Glucose.: 97 mg/dL (22 Mar 2019 07:56)  POCT Blood Glucose.: 197 mg/dL (21 Mar 2019 22:05)  POCT Blood Glucose.: 203 mg/dL (21 Mar 2019 18:46)  POCT Blood Glucose.: 147 mg/dL (21 Mar 2019 17:01)        RADIOLOGY & ADDITIONAL TESTS:

## 2019-03-22 NOTE — PHYSICAL THERAPY INITIAL EVALUATION ADULT - GENERAL OBSERVATIONS, REHAB EVAL
pt received supine in bed, NAD, RN present, cardiac monitor, O2 line
Pt received supine in bed + , + O2nc + z float boots, no c/o pain, pt agreeable to PT
pt received semi medley position in bed, NAD, sister present, chest tube, fontenot, cardiac monitor, O2 line

## 2019-03-22 NOTE — PROGRESS NOTE ADULT - PROBLEM SELECTOR PROBLEM 1
Acute on chronic right-sided congestive heart failure
Pleural effusion
Acute on chronic congestive heart failure, unspecified heart failure type
Acute on chronic diastolic (congestive) heart failure
Acute on chronic right-sided congestive heart failure
Blood per rectum
CHF (congestive heart failure)
Rectal bleeding
Acute hypoxemic respiratory failure
Acute on chronic diastolic (congestive) heart failure
Acute on chronic diastolic (congestive) heart failure
Acute on chronic respiratory failure with hypoxia and hypercapnia
Acute on chronic right-sided congestive heart failure
Pleural effusion
Acute on chronic congestive heart failure, unspecified heart failure type
Pleural effusion
Acute on chronic right-sided congestive heart failure
Pleural effusion
Pleural effusion

## 2019-03-22 NOTE — PROGRESS NOTE ADULT - SUBJECTIVE AND OBJECTIVE BOX
[place] [facility]                                                        Office: [address] [address]                                                       Telephone: [phone]. Fax: [phone]      CC: Shortness of breath    INTERVAL HISTORY: Improved symptoms.     MEDICATIONS  (STANDING):  ALBUTerol    0.083% 10 milliGRAM(s) Nebulizer once  amLODIPine   Tablet 5 milliGRAM(s) Oral daily  ascorbic acid 500 milliGRAM(s) Oral daily  aspirin  chewable 81 milliGRAM(s) Oral daily  atorvastatin 80 milliGRAM(s) Oral at bedtime  carvedilol 6.25 milliGRAM(s) Oral every 12 hours  dextrose 5%. 1000 milliLiter(s) (50 mL/Hr) IV Continuous <Continuous>  dextrose 50% Injectable 12.5 Gram(s) IV Push once  dextrose 50% Injectable 25 Gram(s) IV Push once  dextrose 50% Injectable 25 Gram(s) IV Push once  docusate sodium 100 milliGRAM(s) Oral two times a day  DULoxetine 60 milliGRAM(s) Oral daily  enoxaparin Injectable 40 milliGRAM(s) SubCutaneous daily  epoetin sara Injectable 81194 Unit(s) SubCutaneous <User Schedule>  furosemide   Injectable 60 milliGRAM(s) IV Push every 12 hours  gabapentin 100 milliGRAM(s) Oral three times a day  insulin glargine Injectable (LANTUS) 30 Unit(s) SubCutaneous at bedtime  insulin lispro (HumaLOG) corrective regimen sliding scale   SubCutaneous Before meals and at bedtime  insulin lispro Injectable (HumaLOG) 5 Unit(s) SubCutaneous three times a day before meals  isosorbide   mononitrate ER Tablet (IMDUR) 60 milliGRAM(s) Oral daily  losartan 25 milliGRAM(s) Oral daily  magnesium oxide 400 milliGRAM(s) Oral daily  methylPREDNISolone sodium succinate Injectable 40 milliGRAM(s) IV Push every 12 hours  Nephro-riya 1 Tablet(s) Oral daily  pantoprazole    Tablet 40 milliGRAM(s) Oral before breakfast  polyethylene glycol 3350 17 Gram(s) Oral daily  senna 2 Tablet(s) Oral at bedtime  tamsulosin 0.4 milliGRAM(s) Oral at bedtime    ROS: All others negative     PHYSICAL EXAM:  T(C): 37 (03-22-19 @ 07:47), Max: 37 (03-22-19 @ 07:47)  HR: 70 (03-22-19 @ 07:47) (67 - 82)  BP: 115/56 (03-22-19 @ 07:47) (99/63 - 138/72)  RR: 19 (03-22-19 @ 07:47) (18 - 20)  SpO2: 96% (03-22-19 @ 07:47) (87% - 100%)  Wt(kg): --  I&O's Summary    21 Mar 2019 07:01  -  22 Mar 2019 07:00  --------------------------------------------------------  IN: 0 mL / OUT: 1700 mL / NET: -1700 mL      Appearance: Normal	  HEENT:   blind in one eye.   Lymphatic: No lymphadenopathy  Cardiovascular: Normal S1 S2, No JVD, No murmurs, No edema  Respiratory: Lungs clear to auscultation	  Psychiatry: A & O x 3, Mood & affect appropriate  Gastrointestinal:  Soft, Non-tender, + BS	  Skin: No rashes, No ecchymoses, No cyanosis  Neurologic: Non-focal  Extremities: limited range of motion.   Vascular: Peripheral pulses palpable 2+ bilaterally    TELEMETRY: 	      LABS:	 	                        8.0    5.6   )-----------( 125      ( 21 Mar 2019 05:53 )             23.8     03-21    144  |  100  |  71.0<H>  ----------------------------<  106  4.5   |  33.0<H>  |  1.12    Ca    8.5<L>      21 Mar 2019 05:53  Phos  2.1     03-21  Mg     2.2     03-21

## 2019-03-22 NOTE — PROGRESS NOTE ADULT - SUBJECTIVE AND OBJECTIVE BOX
NEPHROLOGY INTERVAL HPI/OVERNIGHT EVENTS:    No new events.    MEDICATIONS  (STANDING):  ALBUTerol    0.083% 10 milliGRAM(s) Nebulizer once  amLODIPine   Tablet 5 milliGRAM(s) Oral daily  ascorbic acid 500 milliGRAM(s) Oral daily  aspirin  chewable 81 milliGRAM(s) Oral daily  atorvastatin 80 milliGRAM(s) Oral at bedtime  carvedilol 6.25 milliGRAM(s) Oral every 12 hours  dextrose 5%. 1000 milliLiter(s) (50 mL/Hr) IV Continuous <Continuous>  dextrose 50% Injectable 12.5 Gram(s) IV Push once  dextrose 50% Injectable 25 Gram(s) IV Push once  dextrose 50% Injectable 25 Gram(s) IV Push once  docusate sodium 100 milliGRAM(s) Oral two times a day  DULoxetine 60 milliGRAM(s) Oral daily  enoxaparin Injectable 40 milliGRAM(s) SubCutaneous daily  epoetin sara Injectable 61752 Unit(s) SubCutaneous <User Schedule>  furosemide   Injectable 60 milliGRAM(s) IV Push every 12 hours  gabapentin 100 milliGRAM(s) Oral three times a day  insulin glargine Injectable (LANTUS) 30 Unit(s) SubCutaneous at bedtime  insulin lispro (HumaLOG) corrective regimen sliding scale   SubCutaneous Before meals and at bedtime  insulin lispro Injectable (HumaLOG) 5 Unit(s) SubCutaneous three times a day before meals  isosorbide   mononitrate ER Tablet (IMDUR) 60 milliGRAM(s) Oral daily  losartan 25 milliGRAM(s) Oral daily  magnesium oxide 400 milliGRAM(s) Oral daily  methylPREDNISolone sodium succinate Injectable 40 milliGRAM(s) IV Push every 12 hours  Nephro-riya 1 Tablet(s) Oral daily  pantoprazole    Tablet 40 milliGRAM(s) Oral before breakfast  polyethylene glycol 3350 17 Gram(s) Oral daily  senna 2 Tablet(s) Oral at bedtime  tamsulosin 0.4 milliGRAM(s) Oral at bedtime    MEDICATIONS  (PRN):  acetaminophen   Tablet .. 650 milliGRAM(s) Oral every 6 hours PRN Mild Pain (1 - 3)  ALBUTerol/ipratropium for Nebulization 3 milliLiter(s) Nebulizer every 6 hours PRN Shortness of Breath and/or Wheezing  dextrose 40% Gel 15 Gram(s) Oral once PRN Blood Glucose LESS THAN 70 milliGRAM(s)/deciliter  glucagon  Injectable 1 milliGRAM(s) IntraMuscular once PRN Glucose LESS THAN 70 milligrams/deciliter  glycopyrrolate Injectable 0.1 milliGRAM(s) IV Push every 8 hours PRN secreations  guaiFENesin    Syrup 100 milliGRAM(s) Oral every 6 hours PRN Cough  hydrALAZINE Injectable 10 milliGRAM(s) IV Push every 4 hours PRN sbp> 180  ondansetron Injectable 4 milliGRAM(s) IV Push every 6 hours PRN Nausea and/or Vomiting      Allergies    Accupril (Other)      Vital Signs Last 24 Hrs  T(C): 37 (22 Mar 2019 07:47), Max: 37 (22 Mar 2019 07:47)  T(F): 98.6 (22 Mar 2019 07:47), Max: 98.6 (22 Mar 2019 07:47)  HR: 70 (22 Mar 2019 07:47) (67 - 82)  BP: 115/56 (22 Mar 2019 07:47) (99/63 - 138/72)  BP(mean): --  RR: 19 (22 Mar 2019 07:47) (18 - 20)  SpO2: 96% (22 Mar 2019 07:47) (87% - 100%)  T(C): 36.6 (21 Mar 2019 15:25), Max: 36.9 (20 Mar 2019 21:14)  T(F): 97.9 (21 Mar 2019 15:25), Max: 98.4 (20 Mar 2019 21:14)  HR: 78 (21 Mar 2019 15:38) (68 - 78)  BP: 110/58 (21 Mar 2019 15:25) (93/55 - 144/64)  BP(mean): --  RR: 18 (21 Mar 2019 15:25) (16 - 19)  SpO2: 89% (21 Mar 2019 15:38) (87% - 94%)      PHYSICAL EXAM:  Cardiovascular: Normal S1 S2, No rub, sternal scar same  Respiratory: Clear lungs with decreased BS at bases	  Gastrointestinal:  Soft, Non-tender, + BS	  Extremities: no lower leg edema but has stasis changes  Still with fontenot , clearing , I&O noted      LABS:                        8.0    5.6   )-----------( 125      ( 21 Mar 2019 05:53 )             23.8     03-21    144  |  100  |  71.0<H>  ----------------------------<  106  4.5   |  33.0<H>  |  1.12    Ca    8.5<L>      21 Mar 2019 05:53  Phos  2.1     03-21  Mg     2.2     03-21          Magnesium, Serum: 2.2 mg/dL (03-21 @ 05:53)  Phosphorus Level, Serum: 2.1 mg/dL (03-21 @ 05:53)          RADIOLOGY & ADDITIONAL TESTS:

## 2019-03-22 NOTE — PHYSICAL THERAPY INITIAL EVALUATION ADULT - MANUAL MUSCLE TESTING RESULTS, REHAB EVAL
except bre LE: grossly 3+/5 throughout/no strength deficits were identified
except bre LE: grossly 4-/5 throughout/no strength deficits were identified
BLE grossly weak but WFL

## 2019-03-23 LAB
ANION GAP SERPL CALC-SCNC: 7 MMOL/L — SIGNIFICANT CHANGE UP (ref 5–17)
BUN SERPL-MCNC: 70 MG/DL — HIGH (ref 8–20)
CALCIUM SERPL-MCNC: 8.3 MG/DL — LOW (ref 8.6–10.2)
CHLORIDE SERPL-SCNC: 100 MMOL/L — SIGNIFICANT CHANGE UP (ref 98–107)
CO2 SERPL-SCNC: 36 MMOL/L — HIGH (ref 22–29)
CREAT SERPL-MCNC: 1.14 MG/DL — SIGNIFICANT CHANGE UP (ref 0.5–1.3)
GLUCOSE BLDC GLUCOMTR-MCNC: 128 MG/DL — HIGH (ref 70–99)
GLUCOSE BLDC GLUCOMTR-MCNC: 168 MG/DL — HIGH (ref 70–99)
GLUCOSE BLDC GLUCOMTR-MCNC: 185 MG/DL — HIGH (ref 70–99)
GLUCOSE BLDC GLUCOMTR-MCNC: 234 MG/DL — HIGH (ref 70–99)
GLUCOSE BLDC GLUCOMTR-MCNC: 52 MG/DL — LOW (ref 70–99)
GLUCOSE SERPL-MCNC: 96 MG/DL — SIGNIFICANT CHANGE UP (ref 70–115)
HCT VFR BLD CALC: 23.1 % — LOW (ref 37–47)
HGB BLD-MCNC: 7.8 G/DL — LOW (ref 12–16)
MAGNESIUM SERPL-MCNC: 2.2 MG/DL — SIGNIFICANT CHANGE UP (ref 1.6–2.6)
MCHC RBC-ENTMCNC: 30.4 PG — SIGNIFICANT CHANGE UP (ref 27–31)
MCHC RBC-ENTMCNC: 33.8 G/DL — SIGNIFICANT CHANGE UP (ref 32–36)
MCV RBC AUTO: 89.9 FL — SIGNIFICANT CHANGE UP (ref 81–99)
PLATELET # BLD AUTO: 141 K/UL — LOW (ref 150–400)
POTASSIUM SERPL-MCNC: 4.3 MMOL/L — SIGNIFICANT CHANGE UP (ref 3.5–5.3)
POTASSIUM SERPL-SCNC: 4.3 MMOL/L — SIGNIFICANT CHANGE UP (ref 3.5–5.3)
RBC # BLD: 2.57 M/UL — LOW (ref 4.4–5.2)
RBC # FLD: 19 % — HIGH (ref 11–15.6)
SODIUM SERPL-SCNC: 143 MMOL/L — SIGNIFICANT CHANGE UP (ref 135–145)
WBC # BLD: 5.4 K/UL — SIGNIFICANT CHANGE UP (ref 4.8–10.8)
WBC # FLD AUTO: 5.4 K/UL — SIGNIFICANT CHANGE UP (ref 4.8–10.8)

## 2019-03-23 PROCEDURE — 99232 SBSQ HOSP IP/OBS MODERATE 35: CPT

## 2019-03-23 RX ORDER — MAGNESIUM HYDROXIDE 400 MG/1
30 TABLET, CHEWABLE ORAL DAILY
Qty: 0 | Refills: 0 | Status: DISCONTINUED | OUTPATIENT
Start: 2019-03-23 | End: 2019-03-26

## 2019-03-23 RX ORDER — DEXTROSE 50 % IN WATER 50 %
25 SYRINGE (ML) INTRAVENOUS ONCE
Qty: 0 | Refills: 0 | Status: COMPLETED | OUTPATIENT
Start: 2019-03-23 | End: 2019-03-23

## 2019-03-23 RX ADMIN — CARVEDILOL PHOSPHATE 6.25 MILLIGRAM(S): 80 CAPSULE, EXTENDED RELEASE ORAL at 06:37

## 2019-03-23 RX ADMIN — Medication 100 MILLIGRAM(S): at 17:22

## 2019-03-23 RX ADMIN — CARVEDILOL PHOSPHATE 6.25 MILLIGRAM(S): 80 CAPSULE, EXTENDED RELEASE ORAL at 17:22

## 2019-03-23 RX ADMIN — Medication 2: at 11:42

## 2019-03-23 RX ADMIN — INSULIN GLARGINE 30 UNIT(S): 100 INJECTION, SOLUTION SUBCUTANEOUS at 22:49

## 2019-03-23 RX ADMIN — Medication 40 MILLIGRAM(S): at 18:25

## 2019-03-23 RX ADMIN — Medication 25 GRAM(S): at 08:14

## 2019-03-23 RX ADMIN — Medication 2: at 22:50

## 2019-03-23 RX ADMIN — DULOXETINE HYDROCHLORIDE 60 MILLIGRAM(S): 30 CAPSULE, DELAYED RELEASE ORAL at 11:44

## 2019-03-23 RX ADMIN — Medication 81 MILLIGRAM(S): at 11:44

## 2019-03-23 RX ADMIN — TAMSULOSIN HYDROCHLORIDE 0.4 MILLIGRAM(S): 0.4 CAPSULE ORAL at 22:49

## 2019-03-23 RX ADMIN — LOSARTAN POTASSIUM 25 MILLIGRAM(S): 100 TABLET, FILM COATED ORAL at 06:37

## 2019-03-23 RX ADMIN — Medication 20 MILLIGRAM(S): at 06:37

## 2019-03-23 RX ADMIN — POLYETHYLENE GLYCOL 3350 17 GRAM(S): 17 POWDER, FOR SOLUTION ORAL at 22:49

## 2019-03-23 RX ADMIN — GABAPENTIN 100 MILLIGRAM(S): 400 CAPSULE ORAL at 22:49

## 2019-03-23 RX ADMIN — Medication 500 MILLIGRAM(S): at 11:43

## 2019-03-23 RX ADMIN — Medication 5 UNIT(S): at 11:42

## 2019-03-23 RX ADMIN — PANTOPRAZOLE SODIUM 40 MILLIGRAM(S): 20 TABLET, DELAYED RELEASE ORAL at 06:37

## 2019-03-23 RX ADMIN — GABAPENTIN 100 MILLIGRAM(S): 400 CAPSULE ORAL at 13:25

## 2019-03-23 RX ADMIN — MAGNESIUM OXIDE 400 MG ORAL TABLET 400 MILLIGRAM(S): 241.3 TABLET ORAL at 11:43

## 2019-03-23 RX ADMIN — ISOSORBIDE MONONITRATE 60 MILLIGRAM(S): 60 TABLET, EXTENDED RELEASE ORAL at 11:43

## 2019-03-23 RX ADMIN — ATORVASTATIN CALCIUM 80 MILLIGRAM(S): 80 TABLET, FILM COATED ORAL at 22:49

## 2019-03-23 RX ADMIN — Medication 4: at 16:17

## 2019-03-23 RX ADMIN — Medication 1 TABLET(S): at 11:43

## 2019-03-23 RX ADMIN — GABAPENTIN 100 MILLIGRAM(S): 400 CAPSULE ORAL at 06:37

## 2019-03-23 RX ADMIN — AMLODIPINE BESYLATE 5 MILLIGRAM(S): 2.5 TABLET ORAL at 06:37

## 2019-03-23 RX ADMIN — Medication 40 MILLIGRAM(S): at 06:37

## 2019-03-23 RX ADMIN — ENOXAPARIN SODIUM 40 MILLIGRAM(S): 100 INJECTION SUBCUTANEOUS at 22:49

## 2019-03-23 RX ADMIN — Medication 5 UNIT(S): at 16:18

## 2019-03-23 RX ADMIN — SENNA PLUS 2 TABLET(S): 8.6 TABLET ORAL at 22:49

## 2019-03-23 RX ADMIN — Medication 100 MILLIGRAM(S): at 06:37

## 2019-03-23 NOTE — PROGRESS NOTE ADULT - SUBJECTIVE AND OBJECTIVE BOX
seen for resp failure    no acute complaints/events  + constipation  ros negative     MEDICATIONS  (STANDING):  ALBUTerol    0.083% 10 milliGRAM(s) Nebulizer once  amLODIPine   Tablet 5 milliGRAM(s) Oral daily  ascorbic acid 500 milliGRAM(s) Oral daily  aspirin  chewable 81 milliGRAM(s) Oral daily  atorvastatin 80 milliGRAM(s) Oral at bedtime  carvedilol 6.25 milliGRAM(s) Oral every 12 hours  dextrose 5%. 1000 milliLiter(s) (50 mL/Hr) IV Continuous <Continuous>  dextrose 50% Injectable 12.5 Gram(s) IV Push once  dextrose 50% Injectable 25 Gram(s) IV Push once  dextrose 50% Injectable 25 Gram(s) IV Push once  docusate sodium 100 milliGRAM(s) Oral two times a day  DULoxetine 60 milliGRAM(s) Oral daily  enoxaparin Injectable 40 milliGRAM(s) SubCutaneous daily  epoetin sara Injectable 34824 Unit(s) SubCutaneous <User Schedule>  gabapentin 100 milliGRAM(s) Oral three times a day  insulin glargine Injectable (LANTUS) 30 Unit(s) SubCutaneous at bedtime  insulin lispro (HumaLOG) corrective regimen sliding scale   SubCutaneous Before meals and at bedtime  insulin lispro Injectable (HumaLOG) 5 Unit(s) SubCutaneous three times a day before meals  isosorbide   mononitrate ER Tablet (IMDUR) 60 milliGRAM(s) Oral daily  losartan 25 milliGRAM(s) Oral daily  magnesium oxide 400 milliGRAM(s) Oral daily  Nephro-riya 1 Tablet(s) Oral daily  pantoprazole    Tablet 40 milliGRAM(s) Oral before breakfast  polyethylene glycol 3350 17 Gram(s) Oral daily  predniSONE   Tablet 20 milliGRAM(s) Oral daily  senna 2 Tablet(s) Oral at bedtime  tamsulosin 0.4 milliGRAM(s) Oral at bedtime  torsemide 40 milliGRAM(s) Oral two times a day    MEDICATIONS  (PRN):  acetaminophen   Tablet .. 650 milliGRAM(s) Oral every 6 hours PRN Mild Pain (1 - 3)  ALBUTerol/ipratropium for Nebulization 3 milliLiter(s) Nebulizer every 6 hours PRN Shortness of Breath and/or Wheezing  bisacodyl 5 milliGRAM(s) Oral every 12 hours PRN Constipation  dextrose 40% Gel 15 Gram(s) Oral once PRN Blood Glucose LESS THAN 70 milliGRAM(s)/deciliter  glucagon  Injectable 1 milliGRAM(s) IntraMuscular once PRN Glucose LESS THAN 70 milligrams/deciliter  glycopyrrolate Injectable 0.1 milliGRAM(s) IV Push every 8 hours PRN secreations  guaiFENesin    Syrup 100 milliGRAM(s) Oral every 6 hours PRN Cough  hydrALAZINE Injectable 10 milliGRAM(s) IV Push every 4 hours PRN sbp> 180  magnesium hydroxide Suspension 30 milliLiter(s) Oral daily PRN Constipation  ondansetron Injectable 4 milliGRAM(s) IV Push every 6 hours PRN Nausea and/or Vomiting      Allergies    Accupril (Other)      Vital Signs Last 24 Hrs  T(C): 36.7 (23 Mar 2019 16:32), Max: 36.7 (23 Mar 2019 16:32)  T(F): 98.1 (23 Mar 2019 16:32), Max: 98.1 (23 Mar 2019 16:32)  HR: 75 (23 Mar 2019 16:32) (68 - 75)  BP: 131/54 (23 Mar 2019 16:32) (116/56 - 131/54)  BP(mean): --  RR: 18 (23 Mar 2019 16:32) (18 - 18)  SpO2: 99% (23 Mar 2019 16:32) (92% - 99%)    PHYSICAL EXAM:    GENERAL: NAD  CHEST/LUNG: Clear to percussion bilaterally  HEART: Regular rate and rhythm; S1 S2  ABDOMEN: Soft,; Bowel sounds present  EXTREMITIES:  no edema   NERVOUS SYSTEM:  nonfocal    LABS:                        7.8    5.4   )-----------( 141      ( 23 Mar 2019 08:58 )             23.1     03-23    143  |  100  |  70.0<H>  ----------------------------<  96  4.3   |  36.0<H>  |  1.14    Ca    8.3<L>      23 Mar 2019 08:58  Mg     2.2     03-23            CAPILLARY BLOOD GLUCOSE      POCT Blood Glucose.: 234 mg/dL (23 Mar 2019 16:16)  POCT Blood Glucose.: 168 mg/dL (23 Mar 2019 11:40)  POCT Blood Glucose.: 128 mg/dL (23 Mar 2019 08:15)  POCT Blood Glucose.: 52 mg/dL (23 Mar 2019 07:50)  POCT Blood Glucose.: 164 mg/dL (22 Mar 2019 22:40)  POCT Blood Glucose.: 273 mg/dL (22 Mar 2019 17:20)        RADIOLOGY & ADDITIONAL TESTS:

## 2019-03-23 NOTE — PROGRESS NOTE ADULT - ASSESSMENT
64 yo presenting to ED w/ SOB 2/2 diastolic HF exacerbation and noted to have KEITH and hyperkalemia, PMH CAD s/p CABG and PCI, PAD s/p baloon angioplasty, DM2, HTN, HLD, hx of cardiac arrest, recent GI bleed last month, Diastolic CHF, legally blind, pleural effusion requiring thoracentesis in Dec 2018, admitted for SOB, noted to have pleural effusion, started on bumex, s/p RRT noted on 3/1 for hypotension & lethargy. Pt became hypotensive after Bumex with albumin was given.  CXR with worsening plum edema b/l. BiPAP placed. ICU consult called for vasopressor support during IV diuresis. Midodrine ordered. She became increasingly lethargic with ABG showing resp acidosis with hypoxia. Pulmonary team has been following, she was transferred in ICU as she was having worsening respiratory failure, in ICU she was given High dose steroids as there is possibility of cryptogenic Pneumonia and she was also continued with IV lasix, she improved and was weaned off BiPAP and transfered under Hospitalist service again, her steroids has been tapered down, would require slow taper as per pulmonary, discussed with cardiology will change her lasix 60mg IV BID to torsemide 40mg BID, will wean her off from the supplemental oxygen, she has an episode of hemoptysis early during the course of hospitalization, no more episode, she has UTI and she has completed course of invanz, seen and followed by ID, she has Hx of dysphagia, speech eval appreciated, recommend regular diet with thin liquid, continue with aspiration precautions.   During the course she was also noted to have urine retention, she was given fontenot's cathter and was started on flomax, there was some blood in the urine as well, she was seen by urology, UA was unremarkable, she was given voiding trial and she passed and is able to void, she has no more hematuria.     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 Moderate to Severe TR, mod-severe PHTN, Cardio saw the patient, CXR repeated looked like worsening of effusion, she required 100% oxygen, due to worsening respiratory failure, ICU was consulted and she was taken to the ICU she was continued initially with BiPAP and Lasix and they added high dose steriods, as there was ? cryptogenic organizing PNA, she improved and was weaned off BiPAP and transferred under Hospitalist service, she on solumedrole 40mg Q8 hours, tapering down slowly, pulmonary is following, chnaged to 40 Q12h, she appears to be doing better now that steroids have been restarted and is on being, tolerating NC during the day, Tolerating PO diet, discussed with Dr Recio changed her lasix 60mg IV BID, to 40mg torsemide BID, OOB to chair. BIPAP nocturnally, will wean off  supplemental oxygen, will get PT eval again   repeat CT chest with diffuse ground glass with mod-large right pleural effusion    > Hemoptysis:   per pt present for several days, no recurrence noted on 3/13, ? related to superficial lac in trachea from use of around the clock o2  h/h stable, resume asa + dvt ppx for now, serial h/h, repeat ct chest reviewed, no acute findings that require intervention noted  pulm fu appreciated, no more episode.     > Acute on Chronic Hypercapnic Respiratory Failure due to OHS/KYREE.   stable, Pulm recommend chronic NIV for chronic hypercarbic respiratory failure to improve symptoms and to decrease hospitalizations  c/w Bipap as needed and nocturnal, Eventual SINGH with NIV, c/w steroid taper to PO, avoid Narcotics for pain, improving now, will wean off supplemental oxygen    > RT PTX  - small lesion, noted on CT  - sp chest tube placement + removal  - resolved    >UTI - appreciate ID input, sp completed course of invanz, resolved    >Dysphagia - had RRT due to aspiration, speech eval appreciated - recommend regular diet with thin liquid, aspiration precautions    >Chest pain with hx of CAD & CABG/PCI-  sp PRBC to improve oxygen to myocardium  c/w Imdur, BB, statin  ASA as above   Plavix on hold since last admission due to GI Bleed.     > Lower GI Bleed likely diverticular:   - s/p 1 unit of PRBC, well tolerated, uncomplicated  - s/p Colonoscopy - Poor prep throughout the colon. Diverticulosis seen. Unable to visualize colon sufficiently  No further GI w/u recommended,   cleared by GI to use asa but current use as above  Plavix yet on hold, outpt F/U    >Hyperkalemia  - resolved  - Holding Losartan, resume as needed for HF + bp control    > Acute on chronic kidney injury - resolved  appreciate renal input - - cont Torsemide ==> will need to tolerate azotemia,   resumed small dose of losartan, repeat BMP is stable   f/u BMP     > CAD complicated by stents in 2014 / PAD / HLD / HTN  - Continue, Imdur, Lipitor, Coreg and Norvasc   - Plavix on hold since last admission due to GI Bleed.     > DM 2 not on long term insulin complicated by asymptomatic hyperglycemia  - HbA1c 5.6, goal < 8, her lantus was increase to 30units and on humlog 5 along with sliding scale, previous she was on   - Lantus at 10 units at bedtime plus Humalog 3U, it was increase due to high dose steriods, will decrease with tappering down of steriods.   - c/w moderate sliding scale    > Anemia  - Multifactorial (chronic, complicated with acute gi bleeding during admit)  - s/p Venofer   - Continue Procrit  + stool for occult blood (will need GI work-up as outpt)  - Monitor CBC  - per renal goal hgb 10.     > Transaminitis- resolved  - Likely secondary to liver congestion    > History of Depression  - chronic, stable, uncomplicated  - Continue Duloxetine 60 mg    >B/L UE swelling  - Arm elevation  - Warm compresses   - No DVT, resolved.      >Obesity in setting of Acute Moderate Protein Calorie Malnutrition  bmi 36.1  Nepro TID  Neprovite and Vit C 500mg daily  daily wt.     hematuria: Urology consulted, patient has fontenot's catheter, hematuria resolved, she passed voiding trial,     DVT Prophylaxis -- hold in light of hemoptysis, may resume depending on outcome of w/u    >Goals of care - palliative on board, she is full code.     prognosis guarded, case discussed w/ palliative    >dispo. PT eval appreciated, ultimately SINGH

## 2019-03-23 NOTE — PROGRESS NOTE ADULT - ASSESSMENT
Assess    Obesity  KYREE  CHF  Pulm HTN  CKD  Presumptive  - without radiographic response nor biopsy evidence  Dismal outlook    Rec    Prednisone, 30 mg daily and decrease by 5 mg each month  Cardiac regiment  Nocturnal Bipap  02  HD per renal  Placement?  Revisit Palliation after a brief interval Assess    Obesity  KYREE  CHF  Pulm HTN  CKD  Presumptive  - without radiographic response nor biopsy evidence  Poor outlook    Rec    Prednisone, 30 mg daily and decrease by 5 mg each month  Cardiac regiment  Nocturnal Bipap  02  Placement?  Revisit Palliation after a brief interval Assess    Obesity  KYREE  CHF  Pulm HTN  CKD   being Rx's - without radiographic response, biopsy evidence, and with unusual presence of effusion  Poor outlook    Rec    Prednisone, 30 mg daily and decrease by 5 mg each month  Cardiac regiment  Nocturnal Bipap  02  Placement?  Revisit Palliation after a brief interval

## 2019-03-23 NOTE — PROGRESS NOTE ADULT - SUBJECTIVE AND OBJECTIVE BOX
PULMONARY PROGRESS NOTE      RANJIT ROCHA-[id number]    Patient is a 63y old  Female who presents with a chief complaint of SOB (22 Mar 2019 12:21)  Hypoxic respiratory failure  CHF  Functional Quadriplegia  Pleural effusion  Presumed     INTERVAL HPI/OVERNIGHT EVENTS:  New plateau on steroid RX  Still marginal despite HD yesterday    MEDICATIONS  (STANDING):  ALBUTerol    0.083% 10 milliGRAM(s) Nebulizer once  amLODIPine   Tablet 5 milliGRAM(s) Oral daily  ascorbic acid 500 milliGRAM(s) Oral daily  aspirin  chewable 81 milliGRAM(s) Oral daily  atorvastatin 80 milliGRAM(s) Oral at bedtime  carvedilol 6.25 milliGRAM(s) Oral every 12 hours  dextrose 5%. 1000 milliLiter(s) (50 mL/Hr) IV Continuous <Continuous>  dextrose 50% Injectable 12.5 Gram(s) IV Push once  dextrose 50% Injectable 25 Gram(s) IV Push once  dextrose 50% Injectable 25 Gram(s) IV Push once  docusate sodium 100 milliGRAM(s) Oral two times a day  DULoxetine 60 milliGRAM(s) Oral daily  enoxaparin Injectable 40 milliGRAM(s) SubCutaneous daily  epoetin sara Injectable 34072 Unit(s) SubCutaneous <User Schedule>  gabapentin 100 milliGRAM(s) Oral three times a day  insulin glargine Injectable (LANTUS) 30 Unit(s) SubCutaneous at bedtime  insulin lispro (HumaLOG) corrective regimen sliding scale   SubCutaneous Before meals and at bedtime  insulin lispro Injectable (HumaLOG) 5 Unit(s) SubCutaneous three times a day before meals  isosorbide   mononitrate ER Tablet (IMDUR) 60 milliGRAM(s) Oral daily  losartan 25 milliGRAM(s) Oral daily  magnesium oxide 400 milliGRAM(s) Oral daily  methylPREDNISolone sodium succinate Injectable 40 milliGRAM(s) IV Push every 12 hours  Nephro-riya 1 Tablet(s) Oral daily  pantoprazole    Tablet 40 milliGRAM(s) Oral before breakfast  polyethylene glycol 3350 17 Gram(s) Oral daily  senna 2 Tablet(s) Oral at bedtime  tamsulosin 0.4 milliGRAM(s) Oral at bedtime  torsemide 40 milliGRAM(s) Oral two times a day      MEDICATIONS  (PRN):  acetaminophen   Tablet .. 650 milliGRAM(s) Oral every 6 hours PRN Mild Pain (1 - 3)  ALBUTerol/ipratropium for Nebulization 3 milliLiter(s) Nebulizer every 6 hours PRN Shortness of Breath and/or Wheezing  dextrose 40% Gel 15 Gram(s) Oral once PRN Blood Glucose LESS THAN 70 milliGRAM(s)/deciliter  glucagon  Injectable 1 milliGRAM(s) IntraMuscular once PRN Glucose LESS THAN 70 milligrams/deciliter  glycopyrrolate Injectable 0.1 milliGRAM(s) IV Push every 8 hours PRN secreations  guaiFENesin    Syrup 100 milliGRAM(s) Oral every 6 hours PRN Cough  hydrALAZINE Injectable 10 milliGRAM(s) IV Push every 4 hours PRN sbp> 180  ondansetron Injectable 4 milliGRAM(s) IV Push every 6 hours PRN Nausea and/or Vomiting      Allergies    Accupril (Other)    Intolerances        PAST MEDICAL & SURGICAL HISTORY:  Herniated disc, cervical  PAD (peripheral artery disease)  Legally blind  History of peripheral vascular disease  History of retinal detachment  History of CEA (carotid endarterectomy): Right  Hyperlipidemia  Glaucoma  Hypertension  Diabetes  S/P CABG x 1  After cataract  Uveitic glaucoma of both eyes  Detached retina  Atherosclerosis of right carotid artery   delivery delivered      SOCIAL HISTORY  Smoking History:       REVIEW OF SYSTEMS:    CONSTITUTIONAL:  No distress    HEENT:  Eyes:  No diplopia or blurred vision. ENT:  No earache, sore throat or runny nose.    CARDIOVASCULAR:  No pressure, squeezing, tightness, heaviness or aching about the chest; no palpitations.    RESPIRATORY:  No cough, shortness of breath, PND or orthopnea. Mild SOBOE    GASTROINTESTINAL:  No nausea, vomiting or diarrhea.    GENITOURINARY:  No dysuria, frequency or urgency.      PSYCHIATRIC:  No disorder of thought or mood.    Vital Signs Last 24 Hrs  T(C): 37 (22 Mar 2019 07:47), Max: 37 (22 Mar 2019 07:47)  T(F): 98.6 (22 Mar 2019 07:47), Max: 98.6 (22 Mar 2019 07:47)  HR: 70 (22 Mar 2019 07:47) (67 - 82)  BP: 115/56 (22 Mar 2019 07:47) (99/63 - 138/72)  BP(mean): --  RR: 19 (22 Mar 2019 07:47) (18 - 20)  SpO2: 96% (22 Mar 2019 07:47) (87% - 100%)    PHYSICAL EXAMINATION:    GENERAL: The patient is awake and alert in no apparent distress.     HEENT: Head is normocephalic and atraumatic. Extraocular muscles are intact. Mucous membranes are moist.    NECK: Supple.    LUNGS: Clear to auscultation without wheezing, rales or rhonchi; respirations unlabored    HEART: Regular rate and rhythm without murmur.    ABDOMEN: Soft, nontender, and nondistended.      EXTREMITIES: Without any cyanosis, clubbing, rash, lesions or edema.      LABS:                        8.8    6.5   )-----------( 136      ( 22 Mar 2019 10:13 )             26.1     -    142  |  99  |  72.0<H>  ----------------------------<  73  4.2   |  35.0<H>  |  1.24    Ca    8.5<L>      22 Mar 2019 10:13  Phos  2.1     -  Mg     2.2     -                  Serum Pro-Brain Natriuretic Peptide: 6562 pg/mL (19 @ 05:53)          MICROBIOLOGY: Non-contrib    RADIOLOGY & ADDITIONAL STUDIES:    CT Chest on 3/22/19  COMPARISON: Chest x-ray 3/19/2019. And chest CT scan 3/8/2019  CLINICAL INFORMATION: Diffuse pulmonary opacities/ARDS. Assess for   underlying malignancy or effusion..  TECHNIQUE: Contiguous axial 2.5 mm slice thickness images of the chest   were obtained without intravenous contrast administration.  FINDINGS:  There is a moderate-large RIGHT pleural effusion with RIGHT lower lobe of   compression basilar atelectasis. Fluid tracks along the major fissure to   the apex of lung. A diffuse of bilateral groundglass opacities are   throughout both lung parenchyma sparing the superior segment of the RIGHT   lower lobe.  There is gross cardiomegaly with coronary artery calcifications. No   pericardial effusion.  .    There are no mediastinal lymphadenopathy or masses.    .    Visualized upper abdominal viscera unremarkable.    Marginal osteophytes are noted at multiple disc spaces in the spine.     IMPRESSION:  No significant change compared to 3/8/2019 except for a diffuse   involvement of lung parenchyma with groundglass opacities.  Moderate-large RIGHT pleural effusion. Compression atelectasis RIGHT lung   base . Diffuse groundglass opacities . Gross cardiomegaly.  Differential diagnosis includes hypersensitivity pneumonitis. Alveolar   proteinosis, ARDS or atypical presentation of a chronic pulmonary edema   of cardiac or noncardiac origin with RIGHT effusion.    TATE PRESTON M.D., ATTENDING RADIOLOGIST  This document has been electronically signed. Mar 22 2019  5:06PM PULMONARY PROGRESS NOTE      RANJIT ROCHA-[id number]    Patient is a 63y old  Female who presents with a chief complaint of SOB (22 Mar 2019 12:21)  Hypoxic respiratory failure  CHF  Functional Quadriplegia  Pleural effusion  Presumed     INTERVAL HPI/OVERNIGHT EVENTS:  New plateau on steroid RX  Still marginal despite steroid Rx    MEDICATIONS  (STANDING):  ALBUTerol    0.083% 10 milliGRAM(s) Nebulizer once  amLODIPine   Tablet 5 milliGRAM(s) Oral daily  ascorbic acid 500 milliGRAM(s) Oral daily  aspirin  chewable 81 milliGRAM(s) Oral daily  atorvastatin 80 milliGRAM(s) Oral at bedtime  carvedilol 6.25 milliGRAM(s) Oral every 12 hours  dextrose 5%. 1000 milliLiter(s) (50 mL/Hr) IV Continuous <Continuous>  dextrose 50% Injectable 12.5 Gram(s) IV Push once  dextrose 50% Injectable 25 Gram(s) IV Push once  dextrose 50% Injectable 25 Gram(s) IV Push once  docusate sodium 100 milliGRAM(s) Oral two times a day  DULoxetine 60 milliGRAM(s) Oral daily  enoxaparin Injectable 40 milliGRAM(s) SubCutaneous daily  epoetin sara Injectable 09550 Unit(s) SubCutaneous <User Schedule>  gabapentin 100 milliGRAM(s) Oral three times a day  insulin glargine Injectable (LANTUS) 30 Unit(s) SubCutaneous at bedtime  insulin lispro (HumaLOG) corrective regimen sliding scale   SubCutaneous Before meals and at bedtime  insulin lispro Injectable (HumaLOG) 5 Unit(s) SubCutaneous three times a day before meals  isosorbide   mononitrate ER Tablet (IMDUR) 60 milliGRAM(s) Oral daily  losartan 25 milliGRAM(s) Oral daily  magnesium oxide 400 milliGRAM(s) Oral daily  methylPREDNISolone sodium succinate Injectable 40 milliGRAM(s) IV Push every 12 hours  Nephro-riya 1 Tablet(s) Oral daily  pantoprazole    Tablet 40 milliGRAM(s) Oral before breakfast  polyethylene glycol 3350 17 Gram(s) Oral daily  senna 2 Tablet(s) Oral at bedtime  tamsulosin 0.4 milliGRAM(s) Oral at bedtime  torsemide 40 milliGRAM(s) Oral two times a day      MEDICATIONS  (PRN):  acetaminophen   Tablet .. 650 milliGRAM(s) Oral every 6 hours PRN Mild Pain (1 - 3)  ALBUTerol/ipratropium for Nebulization 3 milliLiter(s) Nebulizer every 6 hours PRN Shortness of Breath and/or Wheezing  dextrose 40% Gel 15 Gram(s) Oral once PRN Blood Glucose LESS THAN 70 milliGRAM(s)/deciliter  glucagon  Injectable 1 milliGRAM(s) IntraMuscular once PRN Glucose LESS THAN 70 milligrams/deciliter  glycopyrrolate Injectable 0.1 milliGRAM(s) IV Push every 8 hours PRN secreations  guaiFENesin    Syrup 100 milliGRAM(s) Oral every 6 hours PRN Cough  hydrALAZINE Injectable 10 milliGRAM(s) IV Push every 4 hours PRN sbp> 180  ondansetron Injectable 4 milliGRAM(s) IV Push every 6 hours PRN Nausea and/or Vomiting      Allergies    Accupril (Other)    Intolerances        PAST MEDICAL & SURGICAL HISTORY:  Herniated disc, cervical  PAD (peripheral artery disease)  Legally blind  History of peripheral vascular disease  History of retinal detachment  History of CEA (carotid endarterectomy): Right  Hyperlipidemia  Glaucoma  Hypertension  Diabetes  S/P CABG x 1  After cataract  Uveitic glaucoma of both eyes  Detached retina  Atherosclerosis of right carotid artery   delivery delivered      SOCIAL HISTORY  Smoking History:       REVIEW OF SYSTEMS:    CONSTITUTIONAL:  No distress    HEENT:  Eyes:  No diplopia or blurred vision. ENT:  No earache, sore throat or runny nose.    CARDIOVASCULAR:  No pressure, squeezing, tightness, heaviness or aching about the chest; no palpitations.    RESPIRATORY:  No cough, shortness of breath, PND or orthopnea. Mild SOBOE    GASTROINTESTINAL:  No nausea, vomiting or diarrhea.    GENITOURINARY:  No dysuria, frequency or urgency.      PSYCHIATRIC:  No disorder of thought or mood.    Vital Signs Last 24 Hrs  T(C): 37 (22 Mar 2019 07:47), Max: 37 (22 Mar 2019 07:47)  T(F): 98.6 (22 Mar 2019 07:47), Max: 98.6 (22 Mar 2019 07:47)  HR: 70 (22 Mar 2019 07:47) (67 - 82)  BP: 115/56 (22 Mar 2019 07:47) (99/63 - 138/72)  BP(mean): --  RR: 19 (22 Mar 2019 07:47) (18 - 20)  SpO2: 96% (22 Mar 2019 07:47) (87% - 100%)    PHYSICAL EXAMINATION:    GENERAL: The patient is awake and alert in no apparent distress.     HEENT: Head is normocephalic and atraumatic. Extraocular muscles are intact. Mucous membranes are moist.    NECK: Supple.    LUNGS: Clear to auscultation without wheezing, rales or rhonchi; respirations unlabored    HEART: Regular rate and rhythm without murmur.    ABDOMEN: Soft, nontender, and nondistended.      EXTREMITIES: Without any cyanosis, clubbing, rash, lesions or edema.      LABS:                        8.8    6.5   )-----------( 136      ( 22 Mar 2019 10:13 )             26.1     -    142  |  99  |  72.0<H>  ----------------------------<  73  4.2   |  35.0<H>  |  1.24    Ca    8.5<L>      22 Mar 2019 10:13  Phos  2.1     -  Mg     2.2     -                  Serum Pro-Brain Natriuretic Peptide: 6562 pg/mL (19 @ 05:53)          MICROBIOLOGY: Non-contrib    RADIOLOGY & ADDITIONAL STUDIES:    CT Chest on 3/22/19  COMPARISON: Chest x-ray 3/19/2019. And chest CT scan 3/8/2019  CLINICAL INFORMATION: Diffuse pulmonary opacities/ARDS. Assess for   underlying malignancy or effusion..  TECHNIQUE: Contiguous axial 2.5 mm slice thickness images of the chest   were obtained without intravenous contrast administration.  FINDINGS:  There is a moderate-large RIGHT pleural effusion with RIGHT lower lobe of   compression basilar atelectasis. Fluid tracks along the major fissure to   the apex of lung. A diffuse of bilateral groundglass opacities are   throughout both lung parenchyma sparing the superior segment of the RIGHT   lower lobe.  There is gross cardiomegaly with coronary artery calcifications. No   pericardial effusion.  .    There are no mediastinal lymphadenopathy or masses.    .    Visualized upper abdominal viscera unremarkable.    Marginal osteophytes are noted at multiple disc spaces in the spine.     IMPRESSION:  No significant change compared to 3/8/2019 except for a diffuse   involvement of lung parenchyma with groundglass opacities.  Moderate-large RIGHT pleural effusion. Compression atelectasis RIGHT lung   base . Diffuse groundglass opacities . Gross cardiomegaly.  Differential diagnosis includes hypersensitivity pneumonitis. Alveolar   proteinosis, ARDS or atypical presentation of a chronic pulmonary edema   of cardiac or noncardiac origin with RIGHT effusion.    TATE PRESTON M.D., ATTENDING RADIOLOGIST  This document has been electronically signed. Mar 22 2019  5:06PM PULMONARY PROGRESS NOTE      RANJIT ROCHA-[id number]    Patient is a 63y old  Female who presents with a chief complaint of SOB (22 Mar 2019 12:21)  Hypoxic respiratory failure  CHF  Functional Quadriplegia  Pleural effusion  Presumed  - not generally associated with effusion    INTERVAL HPI/OVERNIGHT EVENTS:  New plateau on steroid RX  Still marginal despite steroid Rx    MEDICATIONS  (STANDING):  ALBUTerol    0.083% 10 milliGRAM(s) Nebulizer once  amLODIPine   Tablet 5 milliGRAM(s) Oral daily  ascorbic acid 500 milliGRAM(s) Oral daily  aspirin  chewable 81 milliGRAM(s) Oral daily  atorvastatin 80 milliGRAM(s) Oral at bedtime  carvedilol 6.25 milliGRAM(s) Oral every 12 hours  dextrose 5%. 1000 milliLiter(s) (50 mL/Hr) IV Continuous <Continuous>  dextrose 50% Injectable 12.5 Gram(s) IV Push once  dextrose 50% Injectable 25 Gram(s) IV Push once  dextrose 50% Injectable 25 Gram(s) IV Push once  docusate sodium 100 milliGRAM(s) Oral two times a day  DULoxetine 60 milliGRAM(s) Oral daily  enoxaparin Injectable 40 milliGRAM(s) SubCutaneous daily  epoetin sara Injectable 04736 Unit(s) SubCutaneous <User Schedule>  gabapentin 100 milliGRAM(s) Oral three times a day  insulin glargine Injectable (LANTUS) 30 Unit(s) SubCutaneous at bedtime  insulin lispro (HumaLOG) corrective regimen sliding scale   SubCutaneous Before meals and at bedtime  insulin lispro Injectable (HumaLOG) 5 Unit(s) SubCutaneous three times a day before meals  isosorbide   mononitrate ER Tablet (IMDUR) 60 milliGRAM(s) Oral daily  losartan 25 milliGRAM(s) Oral daily  magnesium oxide 400 milliGRAM(s) Oral daily  methylPREDNISolone sodium succinate Injectable 40 milliGRAM(s) IV Push every 12 hours  Nephro-riya 1 Tablet(s) Oral daily  pantoprazole    Tablet 40 milliGRAM(s) Oral before breakfast  polyethylene glycol 3350 17 Gram(s) Oral daily  senna 2 Tablet(s) Oral at bedtime  tamsulosin 0.4 milliGRAM(s) Oral at bedtime  torsemide 40 milliGRAM(s) Oral two times a day      MEDICATIONS  (PRN):  acetaminophen   Tablet .. 650 milliGRAM(s) Oral every 6 hours PRN Mild Pain (1 - 3)  ALBUTerol/ipratropium for Nebulization 3 milliLiter(s) Nebulizer every 6 hours PRN Shortness of Breath and/or Wheezing  dextrose 40% Gel 15 Gram(s) Oral once PRN Blood Glucose LESS THAN 70 milliGRAM(s)/deciliter  glucagon  Injectable 1 milliGRAM(s) IntraMuscular once PRN Glucose LESS THAN 70 milligrams/deciliter  glycopyrrolate Injectable 0.1 milliGRAM(s) IV Push every 8 hours PRN secreations  guaiFENesin    Syrup 100 milliGRAM(s) Oral every 6 hours PRN Cough  hydrALAZINE Injectable 10 milliGRAM(s) IV Push every 4 hours PRN sbp> 180  ondansetron Injectable 4 milliGRAM(s) IV Push every 6 hours PRN Nausea and/or Vomiting      Allergies    Accupril (Other)    Intolerances        PAST MEDICAL & SURGICAL HISTORY:  Herniated disc, cervical  PAD (peripheral artery disease)  Legally blind  History of peripheral vascular disease  History of retinal detachment  History of CEA (carotid endarterectomy): Right  Hyperlipidemia  Glaucoma  Hypertension  Diabetes  S/P CABG x 1  After cataract  Uveitic glaucoma of both eyes  Detached retina  Atherosclerosis of right carotid artery   delivery delivered      SOCIAL HISTORY  Smoking History:       REVIEW OF SYSTEMS:    CONSTITUTIONAL:  No distress    HEENT:  Eyes:  No diplopia or blurred vision. ENT:  No earache, sore throat or runny nose.    CARDIOVASCULAR:  No pressure, squeezing, tightness, heaviness or aching about the chest; no palpitations.    RESPIRATORY:  No cough, shortness of breath, PND or orthopnea. Mild SOBOE    GASTROINTESTINAL:  No nausea, vomiting or diarrhea.    GENITOURINARY:  No dysuria, frequency or urgency.      PSYCHIATRIC:  No disorder of thought or mood.    Vital Signs Last 24 Hrs  T(C): 37 (22 Mar 2019 07:47), Max: 37 (22 Mar 2019 07:47)  T(F): 98.6 (22 Mar 2019 07:47), Max: 98.6 (22 Mar 2019 07:47)  HR: 70 (22 Mar 2019 07:47) (67 - 82)  BP: 115/56 (22 Mar 2019 07:47) (99/63 - 138/72)  BP(mean): --  RR: 19 (22 Mar 2019 07:47) (18 - 20)  SpO2: 96% (22 Mar 2019 07:47) (87% - 100%)    PHYSICAL EXAMINATION:    GENERAL: The patient is awake and alert in no apparent distress.     HEENT: Head is normocephalic and atraumatic. Extraocular muscles are intact. Mucous membranes are moist.    NECK: Supple.    LUNGS: Clear to auscultation without wheezing, rales or rhonchi; respirations unlabored    HEART: Regular rate and rhythm without murmur.    ABDOMEN: Soft, nontender, and nondistended.      EXTREMITIES: Without any cyanosis, clubbing, rash, lesions or edema.      LABS:                        8.8    6.5   )-----------( 136      ( 22 Mar 2019 10:13 )             26.1     -    142  |  99  |  72.0<H>  ----------------------------<  73  4.2   |  35.0<H>  |  1.24    Ca    8.5<L>      22 Mar 2019 10:13  Phos  2.1     -  Mg     2.2     -                  Serum Pro-Brain Natriuretic Peptide: 6562 pg/mL (19 @ 05:53)          MICROBIOLOGY: Non-contrib    RADIOLOGY & ADDITIONAL STUDIES:    CT Chest on 3/22/19  COMPARISON: Chest x-ray 3/19/2019. And chest CT scan 3/8/2019  CLINICAL INFORMATION: Diffuse pulmonary opacities/ARDS. Assess for   underlying malignancy or effusion..  TECHNIQUE: Contiguous axial 2.5 mm slice thickness images of the chest   were obtained without intravenous contrast administration.  FINDINGS:  There is a moderate-large RIGHT pleural effusion with RIGHT lower lobe of   compression basilar atelectasis. Fluid tracks along the major fissure to   the apex of lung. A diffuse of bilateral groundglass opacities are   throughout both lung parenchyma sparing the superior segment of the RIGHT   lower lobe.  There is gross cardiomegaly with coronary artery calcifications. No   pericardial effusion.  .    There are no mediastinal lymphadenopathy or masses.    .    Visualized upper abdominal viscera unremarkable.    Marginal osteophytes are noted at multiple disc spaces in the spine.     IMPRESSION:  No significant change compared to 3/8/2019 except for a diffuse   involvement of lung parenchyma with groundglass opacities.  Moderate-large RIGHT pleural effusion. Compression atelectasis RIGHT lung   base . Diffuse groundglass opacities . Gross cardiomegaly.  Differential diagnosis includes hypersensitivity pneumonitis. Alveolar   proteinosis, ARDS or atypical presentation of a chronic pulmonary edema   of cardiac or noncardiac origin with RIGHT effusion.    TATE PRESTON M.D., ATTENDING RADIOLOGIST  This document has been electronically signed. Mar 22 2019  5:06PM

## 2019-03-24 LAB
GLUCOSE BLDC GLUCOMTR-MCNC: 156 MG/DL — HIGH (ref 70–99)
GLUCOSE BLDC GLUCOMTR-MCNC: 216 MG/DL — HIGH (ref 70–99)
GLUCOSE BLDC GLUCOMTR-MCNC: 232 MG/DL — HIGH (ref 70–99)
GLUCOSE BLDC GLUCOMTR-MCNC: 236 MG/DL — HIGH (ref 70–99)
GLUCOSE BLDC GLUCOMTR-MCNC: 77 MG/DL — SIGNIFICANT CHANGE UP (ref 70–99)

## 2019-03-24 PROCEDURE — 99232 SBSQ HOSP IP/OBS MODERATE 35: CPT

## 2019-03-24 RX ADMIN — Medication 1 TABLET(S): at 11:45

## 2019-03-24 RX ADMIN — ISOSORBIDE MONONITRATE 60 MILLIGRAM(S): 60 TABLET, EXTENDED RELEASE ORAL at 11:46

## 2019-03-24 RX ADMIN — Medication 40 MILLIGRAM(S): at 19:00

## 2019-03-24 RX ADMIN — Medication 5 UNIT(S): at 16:23

## 2019-03-24 RX ADMIN — CARVEDILOL PHOSPHATE 6.25 MILLIGRAM(S): 80 CAPSULE, EXTENDED RELEASE ORAL at 06:49

## 2019-03-24 RX ADMIN — ENOXAPARIN SODIUM 40 MILLIGRAM(S): 100 INJECTION SUBCUTANEOUS at 21:15

## 2019-03-24 RX ADMIN — Medication 20 MILLIGRAM(S): at 06:49

## 2019-03-24 RX ADMIN — TAMSULOSIN HYDROCHLORIDE 0.4 MILLIGRAM(S): 0.4 CAPSULE ORAL at 21:16

## 2019-03-24 RX ADMIN — INSULIN GLARGINE 30 UNIT(S): 100 INJECTION, SOLUTION SUBCUTANEOUS at 21:16

## 2019-03-24 RX ADMIN — Medication 81 MILLIGRAM(S): at 11:45

## 2019-03-24 RX ADMIN — Medication 4: at 16:23

## 2019-03-24 RX ADMIN — Medication 100 MILLIGRAM(S): at 06:49

## 2019-03-24 RX ADMIN — MAGNESIUM OXIDE 400 MG ORAL TABLET 400 MILLIGRAM(S): 241.3 TABLET ORAL at 11:45

## 2019-03-24 RX ADMIN — GABAPENTIN 100 MILLIGRAM(S): 400 CAPSULE ORAL at 06:49

## 2019-03-24 RX ADMIN — DULOXETINE HYDROCHLORIDE 60 MILLIGRAM(S): 30 CAPSULE, DELAYED RELEASE ORAL at 11:44

## 2019-03-24 RX ADMIN — Medication 4: at 11:44

## 2019-03-24 RX ADMIN — AMLODIPINE BESYLATE 5 MILLIGRAM(S): 2.5 TABLET ORAL at 06:49

## 2019-03-24 RX ADMIN — Medication 100 MILLIGRAM(S): at 17:20

## 2019-03-24 RX ADMIN — GABAPENTIN 100 MILLIGRAM(S): 400 CAPSULE ORAL at 21:16

## 2019-03-24 RX ADMIN — ATORVASTATIN CALCIUM 80 MILLIGRAM(S): 80 TABLET, FILM COATED ORAL at 21:16

## 2019-03-24 RX ADMIN — POLYETHYLENE GLYCOL 3350 17 GRAM(S): 17 POWDER, FOR SOLUTION ORAL at 21:15

## 2019-03-24 RX ADMIN — LOSARTAN POTASSIUM 25 MILLIGRAM(S): 100 TABLET, FILM COATED ORAL at 06:49

## 2019-03-24 RX ADMIN — PANTOPRAZOLE SODIUM 40 MILLIGRAM(S): 20 TABLET, DELAYED RELEASE ORAL at 06:51

## 2019-03-24 RX ADMIN — CARVEDILOL PHOSPHATE 6.25 MILLIGRAM(S): 80 CAPSULE, EXTENDED RELEASE ORAL at 17:20

## 2019-03-24 RX ADMIN — Medication 40 MILLIGRAM(S): at 06:50

## 2019-03-24 RX ADMIN — Medication 5 UNIT(S): at 11:44

## 2019-03-24 RX ADMIN — SENNA PLUS 2 TABLET(S): 8.6 TABLET ORAL at 21:16

## 2019-03-24 RX ADMIN — Medication 4: at 21:15

## 2019-03-24 NOTE — PROGRESS NOTE ADULT - SUBJECTIVE AND OBJECTIVE BOX
seen for pulm edema, pulm HTN    no acute complaints  comfortable on nc  ros otherwise negative     MEDICATIONS  (STANDING):  ALBUTerol    0.083% 10 milliGRAM(s) Nebulizer once  amLODIPine   Tablet 5 milliGRAM(s) Oral daily  ascorbic acid 500 milliGRAM(s) Oral daily  aspirin  chewable 81 milliGRAM(s) Oral daily  atorvastatin 80 milliGRAM(s) Oral at bedtime  carvedilol 6.25 milliGRAM(s) Oral every 12 hours  dextrose 5%. 1000 milliLiter(s) (50 mL/Hr) IV Continuous <Continuous>  dextrose 50% Injectable 12.5 Gram(s) IV Push once  dextrose 50% Injectable 25 Gram(s) IV Push once  dextrose 50% Injectable 25 Gram(s) IV Push once  docusate sodium 100 milliGRAM(s) Oral two times a day  DULoxetine 60 milliGRAM(s) Oral daily  enoxaparin Injectable 40 milliGRAM(s) SubCutaneous daily  epoetin sara Injectable 79583 Unit(s) SubCutaneous <User Schedule>  gabapentin 100 milliGRAM(s) Oral three times a day  insulin glargine Injectable (LANTUS) 30 Unit(s) SubCutaneous at bedtime  insulin lispro (HumaLOG) corrective regimen sliding scale   SubCutaneous Before meals and at bedtime  insulin lispro Injectable (HumaLOG) 5 Unit(s) SubCutaneous three times a day before meals  isosorbide   mononitrate ER Tablet (IMDUR) 60 milliGRAM(s) Oral daily  losartan 25 milliGRAM(s) Oral daily  magnesium oxide 400 milliGRAM(s) Oral daily  Nephro-riya 1 Tablet(s) Oral daily  pantoprazole    Tablet 40 milliGRAM(s) Oral before breakfast  polyethylene glycol 3350 17 Gram(s) Oral daily  predniSONE   Tablet 20 milliGRAM(s) Oral daily  senna 2 Tablet(s) Oral at bedtime  tamsulosin 0.4 milliGRAM(s) Oral at bedtime  torsemide 40 milliGRAM(s) Oral two times a day    MEDICATIONS  (PRN):  acetaminophen   Tablet .. 650 milliGRAM(s) Oral every 6 hours PRN Mild Pain (1 - 3)  ALBUTerol/ipratropium for Nebulization 3 milliLiter(s) Nebulizer every 6 hours PRN Shortness of Breath and/or Wheezing  bisacodyl 5 milliGRAM(s) Oral every 12 hours PRN Constipation  dextrose 40% Gel 15 Gram(s) Oral once PRN Blood Glucose LESS THAN 70 milliGRAM(s)/deciliter  glucagon  Injectable 1 milliGRAM(s) IntraMuscular once PRN Glucose LESS THAN 70 milligrams/deciliter  glycopyrrolate Injectable 0.1 milliGRAM(s) IV Push every 8 hours PRN secreations  guaiFENesin    Syrup 100 milliGRAM(s) Oral every 6 hours PRN Cough  hydrALAZINE Injectable 10 milliGRAM(s) IV Push every 4 hours PRN sbp> 180  magnesium hydroxide Suspension 30 milliLiter(s) Oral daily PRN Constipation  ondansetron Injectable 4 milliGRAM(s) IV Push every 6 hours PRN Nausea and/or Vomiting      Allergies    Accupril (Other)        Vital Signs Last 24 Hrs  T(C): 37.1 (24 Mar 2019 08:00), Max: 37.1 (23 Mar 2019 23:54)  T(F): 98.7 (24 Mar 2019 08:00), Max: 98.8 (23 Mar 2019 23:54)  HR: 70 (24 Mar 2019 08:00) (70 - 75)  BP: 121/68 (24 Mar 2019 08:00) (121/68 - 131/54)  BP(mean): --  RR: 20 (24 Mar 2019 08:00) (18 - 20)  SpO2: 92% (24 Mar 2019 08:00) (92% - 99%)    PHYSICAL EXAM:    GENERAL: NAD, obese   CHEST/LUNG: ctab anteriorly   HEART: Regular rate and rhythm; S1 S2  ABDOMEN: Soft,  Bowel sounds present  EXTREMITIES:  no edema   NERVOUS SYSTEM:  Alert & Oriented X3 nonfocal  gen weakness    LABS:                        7.8    5.4   )-----------( 141      ( 23 Mar 2019 08:58 )             23.1     03-23    143  |  100  |  70.0<H>  ----------------------------<  96  4.3   |  36.0<H>  |  1.14    Ca    8.3<L>      23 Mar 2019 08:58  Mg     2.2     03-23            CAPILLARY BLOOD GLUCOSE      POCT Blood Glucose.: 236 mg/dL (24 Mar 2019 11:43)  POCT Blood Glucose.: 156 mg/dL (24 Mar 2019 09:48)  POCT Blood Glucose.: 77 mg/dL (24 Mar 2019 07:33)  POCT Blood Glucose.: 185 mg/dL (23 Mar 2019 22:43)  POCT Blood Glucose.: 234 mg/dL (23 Mar 2019 16:16)        RADIOLOGY & ADDITIONAL TESTS:

## 2019-03-24 NOTE — PROGRESS NOTE ADULT - ASSESSMENT
62 yo presenting to ED w/ SOB 2/2 diastolic HF exacerbation and noted to have KEITH and hyperkalemia, PMH CAD s/p CABG and PCI, PAD s/p baloon angioplasty, DM2, HTN, HLD, hx of cardiac arrest, recent GI bleed last month, Diastolic CHF, legally blind, pleural effusion requiring thoracentesis in Dec 2018, admitted for SOB, noted to have pleural effusion, started on bumex, s/p RRT noted on 3/1 for hypotension & lethargy. Pt became hypotensive after Bumex with albumin was given.  CXR with worsening plum edema b/l. BiPAP placed. ICU consult called for vasopressor support during IV diuresis. Midodrine ordered. She became increasingly lethargic with ABG showing resp acidosis with hypoxia. Pulmonary team has been following, she was transferred in ICU as she was having worsening respiratory failure, in ICU she was given High dose steroids as there is possibility of cryptogenic Pneumonia and she was also continued with IV lasix, she improved and was weaned off BiPAP and transfered under Hospitalist service again, her steroids has been tapered down, would require slow taper as per pulmonary, discussed with cardiology will change her lasix 60mg IV BID to torsemide 40mg BID, will wean her off from the supplemental oxygen, she has an episode of hemoptysis early during the course of hospitalization, no more episode, she has UTI and she has completed course of invanz, seen and followed by ID, she has Hx of dysphagia, speech eval appreciated, recommend regular diet with thin liquid, continue with aspiration precautions.   During the course she was also noted to have urine retention, she was given fontenot's cathter and was started on flomax, there was some blood in the urine as well, she was seen by urology, UA was unremarkable, she was given voiding trial and she passed and is able to void, she has no more hematuria.     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      possible component of cryptogenic organizing pna--treated with steroids, currently on taper.      c/w BIPAP      cardio/pulm following.    > Hemoptysis: resolved.   per pt present for several days, no recurrence noted on 3/13, ? related to superficial lac in trachea from use of around the clock o2  h/h stable, resume asa + dvt ppx for now, serial h/h, repeat ct chest reviewed, no acute findings that require intervention noted  pulm fu appreciated, no more episode.     > Acute on Chronic Hypercapnic Respiratory Failure due to OHS/KYREE.   stable, Pulm recommend chronic NIV for chronic hypercarbic respiratory failure to improve symptoms and to decrease hospitalizations  c/w Bipap as needed and nocturnal, Eventual SINGH with NIV, c/w steroid taper to PO, avoid Narcotics for pain, improving now, will wean off supplemental oxygen    > RT PTX  - small lesion, noted on CT  - sp chest tube placement + removal  - resolved    >UTI - appreciate ID input, sp completed course of invanz, resolved    >Dysphagia - had RRT due to aspiration, speech eval appreciated - recommend regular diet with thin liquid, aspiration precautions    >Chest pain with hx of CAD & CABG/PCI-  sp PRBC to improve oxygen to myocardium  c/w Imdur, BB, statin  ASA as above   Plavix on hold since last admission due to GI Bleed.     > Lower GI Bleed likely diverticular:   - s/p 1 unit of PRBC, well tolerated, uncomplicated  - s/p Colonoscopy - Poor prep throughout the colon. Diverticulosis seen. Unable to visualize colon sufficiently  No further GI w/u recommended,   cleared by GI to use asa but current use as above  Plavix yet on hold, outpt F/U    >Hyperkalemia  - resolved  - Holding Losartan, resume as needed for HF + bp control    > Acute on chronic kidney injury - resolved  appreciate renal input - - cont Torsemide ==> will need to tolerate azotemia,   resumed small dose of losartan, repeat BMP is stable   f/u BMP     > CAD complicated by stents in 2014 / PAD / HLD / HTN  - Continue, Imdur, Lipitor, Coreg and Norvasc   - Plavix on hold since last admission due to GI Bleed.     > DM 2 not on long term insulin complicated by asymptomatic hyperglycemia  - HbA1c 5.6, goal < 8, her lantus was increase to 30units and on humlog 5 along with sliding scale, previous she was on   - Lantus at 10 units at bedtime plus Humalog 3U, it was increase due to high dose steriods, will decrease with tappering down of steriods.   - c/w moderate sliding scale    > Anemia  - Multifactorial (chronic, complicated with acute gi bleeding during admit)  - s/p Venofer   - Continue Procrit  + stool for occult blood (will need GI work-up as outpt)  - Monitor CBC  - per renal goal hgb 10.   check h/h in am--may need transfusion     > Transaminitis- resolved  - Likely secondary to liver congestion    > History of Depression  - chronic, stable, uncomplicated  - Continue Duloxetine 60 mg    >B/L UE swelling  - Arm elevation  - Warm compresses   - No DVT, resolved.      >Obesity in setting of Acute Moderate Protein Calorie Malnutrition  bmi 36.1  Nepro TID  Neprovite and Vit C 500mg daily  daily wt.     hematuria: Urology consulted, patient has fontenot's catheter, hematuria resolved, she passed voiding trial,     >Goals of care - palliative on board, she is full code.     prognosis guarded, case discussed w/ palliative    >dispo. PT darius appreciated, ultimately SINGH

## 2019-03-25 LAB
ANION GAP SERPL CALC-SCNC: 11 MMOL/L — SIGNIFICANT CHANGE UP (ref 5–17)
BLD GP AB SCN SERPL QL: SIGNIFICANT CHANGE UP
BUN SERPL-MCNC: 65 MG/DL — HIGH (ref 8–20)
CALCIUM SERPL-MCNC: 8.4 MG/DL — LOW (ref 8.6–10.2)
CHLORIDE SERPL-SCNC: 98 MMOL/L — SIGNIFICANT CHANGE UP (ref 98–107)
CO2 SERPL-SCNC: 34 MMOL/L — HIGH (ref 22–29)
CREAT SERPL-MCNC: 1.14 MG/DL — SIGNIFICANT CHANGE UP (ref 0.5–1.3)
GLUCOSE BLDC GLUCOMTR-MCNC: 176 MG/DL — HIGH (ref 70–99)
GLUCOSE BLDC GLUCOMTR-MCNC: 224 MG/DL — HIGH (ref 70–99)
GLUCOSE BLDC GLUCOMTR-MCNC: 267 MG/DL — HIGH (ref 70–99)
GLUCOSE BLDC GLUCOMTR-MCNC: 297 MG/DL — HIGH (ref 70–99)
GLUCOSE BLDC GLUCOMTR-MCNC: 58 MG/DL — LOW (ref 70–99)
GLUCOSE BLDC GLUCOMTR-MCNC: 61 MG/DL — LOW (ref 70–99)
GLUCOSE BLDC GLUCOMTR-MCNC: 69 MG/DL — LOW (ref 70–99)
GLUCOSE SERPL-MCNC: 47 MG/DL — CRITICAL LOW (ref 70–115)
HCT VFR BLD CALC: 23.3 % — LOW (ref 37–47)
HGB BLD-MCNC: 7.8 G/DL — LOW (ref 12–16)
MCHC RBC-ENTMCNC: 30.2 PG — SIGNIFICANT CHANGE UP (ref 27–31)
MCHC RBC-ENTMCNC: 33.5 G/DL — SIGNIFICANT CHANGE UP (ref 32–36)
MCV RBC AUTO: 90.3 FL — SIGNIFICANT CHANGE UP (ref 81–99)
PLATELET # BLD AUTO: 146 K/UL — LOW (ref 150–400)
POTASSIUM SERPL-MCNC: 4.2 MMOL/L — SIGNIFICANT CHANGE UP (ref 3.5–5.3)
POTASSIUM SERPL-SCNC: 4.2 MMOL/L — SIGNIFICANT CHANGE UP (ref 3.5–5.3)
RBC # BLD: 2.58 M/UL — LOW (ref 4.4–5.2)
RBC # FLD: 18.7 % — HIGH (ref 11–15.6)
SODIUM SERPL-SCNC: 143 MMOL/L — SIGNIFICANT CHANGE UP (ref 135–145)
TYPE + AB SCN PNL BLD: SIGNIFICANT CHANGE UP
WBC # BLD: 6.1 K/UL — SIGNIFICANT CHANGE UP (ref 4.8–10.8)
WBC # FLD AUTO: 6.1 K/UL — SIGNIFICANT CHANGE UP (ref 4.8–10.8)

## 2019-03-25 PROCEDURE — 99232 SBSQ HOSP IP/OBS MODERATE 35: CPT

## 2019-03-25 RX ORDER — INSULIN GLARGINE 100 [IU]/ML
20 INJECTION, SOLUTION SUBCUTANEOUS AT BEDTIME
Qty: 0 | Refills: 0 | Status: DISCONTINUED | OUTPATIENT
Start: 2019-03-25 | End: 2019-03-26

## 2019-03-25 RX ORDER — DEXTROSE 50 % IN WATER 50 %
15 SYRINGE (ML) INTRAVENOUS ONCE
Qty: 0 | Refills: 0 | Status: COMPLETED | OUTPATIENT
Start: 2019-03-25 | End: 2019-03-25

## 2019-03-25 RX ORDER — FUROSEMIDE 40 MG
60 TABLET ORAL ONCE
Qty: 0 | Refills: 0 | Status: COMPLETED | OUTPATIENT
Start: 2019-03-25 | End: 2019-03-25

## 2019-03-25 RX ADMIN — DULOXETINE HYDROCHLORIDE 60 MILLIGRAM(S): 30 CAPSULE, DELAYED RELEASE ORAL at 11:13

## 2019-03-25 RX ADMIN — Medication 20 MILLIGRAM(S): at 05:17

## 2019-03-25 RX ADMIN — Medication 500 MILLIGRAM(S): at 11:13

## 2019-03-25 RX ADMIN — Medication 1 TABLET(S): at 11:13

## 2019-03-25 RX ADMIN — SENNA PLUS 2 TABLET(S): 8.6 TABLET ORAL at 21:07

## 2019-03-25 RX ADMIN — ATORVASTATIN CALCIUM 80 MILLIGRAM(S): 80 TABLET, FILM COATED ORAL at 21:07

## 2019-03-25 RX ADMIN — Medication 40 MILLIGRAM(S): at 05:16

## 2019-03-25 RX ADMIN — Medication 20 MILLIGRAM(S): at 17:15

## 2019-03-25 RX ADMIN — AMLODIPINE BESYLATE 5 MILLIGRAM(S): 2.5 TABLET ORAL at 05:16

## 2019-03-25 RX ADMIN — Medication 15 GRAM(S): at 07:36

## 2019-03-25 RX ADMIN — LOSARTAN POTASSIUM 25 MILLIGRAM(S): 100 TABLET, FILM COATED ORAL at 05:16

## 2019-03-25 RX ADMIN — ENOXAPARIN SODIUM 40 MILLIGRAM(S): 100 INJECTION SUBCUTANEOUS at 21:07

## 2019-03-25 RX ADMIN — CARVEDILOL PHOSPHATE 6.25 MILLIGRAM(S): 80 CAPSULE, EXTENDED RELEASE ORAL at 17:14

## 2019-03-25 RX ADMIN — TAMSULOSIN HYDROCHLORIDE 0.4 MILLIGRAM(S): 0.4 CAPSULE ORAL at 21:07

## 2019-03-25 RX ADMIN — MAGNESIUM OXIDE 400 MG ORAL TABLET 400 MILLIGRAM(S): 241.3 TABLET ORAL at 11:13

## 2019-03-25 RX ADMIN — Medication 5 UNIT(S): at 12:35

## 2019-03-25 RX ADMIN — Medication 6: at 12:35

## 2019-03-25 RX ADMIN — Medication 6: at 17:12

## 2019-03-25 RX ADMIN — ISOSORBIDE MONONITRATE 60 MILLIGRAM(S): 60 TABLET, EXTENDED RELEASE ORAL at 11:13

## 2019-03-25 RX ADMIN — GABAPENTIN 100 MILLIGRAM(S): 400 CAPSULE ORAL at 21:07

## 2019-03-25 RX ADMIN — Medication 81 MILLIGRAM(S): at 11:13

## 2019-03-25 RX ADMIN — Medication 5 UNIT(S): at 17:12

## 2019-03-25 RX ADMIN — INSULIN GLARGINE 20 UNIT(S): 100 INJECTION, SOLUTION SUBCUTANEOUS at 21:07

## 2019-03-25 RX ADMIN — Medication 100 MILLIGRAM(S): at 05:16

## 2019-03-25 RX ADMIN — CARVEDILOL PHOSPHATE 6.25 MILLIGRAM(S): 80 CAPSULE, EXTENDED RELEASE ORAL at 05:17

## 2019-03-25 RX ADMIN — Medication 650 MILLIGRAM(S): at 12:29

## 2019-03-25 RX ADMIN — POLYETHYLENE GLYCOL 3350 17 GRAM(S): 17 POWDER, FOR SOLUTION ORAL at 21:07

## 2019-03-25 RX ADMIN — Medication 60 MILLIGRAM(S): at 17:15

## 2019-03-25 RX ADMIN — GABAPENTIN 100 MILLIGRAM(S): 400 CAPSULE ORAL at 05:17

## 2019-03-25 RX ADMIN — PANTOPRAZOLE SODIUM 40 MILLIGRAM(S): 20 TABLET, DELAYED RELEASE ORAL at 05:17

## 2019-03-25 RX ADMIN — GABAPENTIN 100 MILLIGRAM(S): 400 CAPSULE ORAL at 11:13

## 2019-03-25 RX ADMIN — Medication 4: at 21:05

## 2019-03-25 RX ADMIN — Medication 650 MILLIGRAM(S): at 11:29

## 2019-03-25 RX ADMIN — Medication 100 MILLIGRAM(S): at 17:14

## 2019-03-25 NOTE — PROGRESS NOTE ADULT - SUBJECTIVE AND OBJECTIVE BOX
seen for pulm HTN, anemia    no acute complaints/events  hypoglycemic this am, lantus dose decrease  tolerated bipap overnight  ros negative     MEDICATIONS  (STANDING):  ALBUTerol    0.083% 10 milliGRAM(s) Nebulizer once  amLODIPine   Tablet 5 milliGRAM(s) Oral daily  ascorbic acid 500 milliGRAM(s) Oral daily  aspirin  chewable 81 milliGRAM(s) Oral daily  atorvastatin 80 milliGRAM(s) Oral at bedtime  carvedilol 6.25 milliGRAM(s) Oral every 12 hours  dextrose 5%. 1000 milliLiter(s) (50 mL/Hr) IV Continuous <Continuous>  dextrose 50% Injectable 12.5 Gram(s) IV Push once  dextrose 50% Injectable 25 Gram(s) IV Push once  dextrose 50% Injectable 25 Gram(s) IV Push once  docusate sodium 100 milliGRAM(s) Oral two times a day  DULoxetine 60 milliGRAM(s) Oral daily  enoxaparin Injectable 40 milliGRAM(s) SubCutaneous daily  epoetin sara Injectable 97496 Unit(s) SubCutaneous <User Schedule>  furosemide   Injectable 60 milliGRAM(s) IV Push once  gabapentin 100 milliGRAM(s) Oral three times a day  insulin glargine Injectable (LANTUS) 20 Unit(s) SubCutaneous at bedtime  insulin lispro (HumaLOG) corrective regimen sliding scale   SubCutaneous Before meals and at bedtime  insulin lispro Injectable (HumaLOG) 5 Unit(s) SubCutaneous three times a day before meals  isosorbide   mononitrate ER Tablet (IMDUR) 60 milliGRAM(s) Oral daily  losartan 25 milliGRAM(s) Oral daily  magnesium oxide 400 milliGRAM(s) Oral daily  Nephro-riya 1 Tablet(s) Oral daily  pantoprazole    Tablet 40 milliGRAM(s) Oral before breakfast  polyethylene glycol 3350 17 Gram(s) Oral daily  predniSONE   Tablet 20 milliGRAM(s) Oral daily  senna 2 Tablet(s) Oral at bedtime  tamsulosin 0.4 milliGRAM(s) Oral at bedtime  torsemide 20 milliGRAM(s) Oral two times a day    MEDICATIONS  (PRN):  acetaminophen   Tablet .. 650 milliGRAM(s) Oral every 6 hours PRN Mild Pain (1 - 3)  ALBUTerol/ipratropium for Nebulization 3 milliLiter(s) Nebulizer every 6 hours PRN Shortness of Breath and/or Wheezing  bisacodyl 5 milliGRAM(s) Oral every 12 hours PRN Constipation  dextrose 40% Gel 15 Gram(s) Oral once PRN Blood Glucose LESS THAN 70 milliGRAM(s)/deciliter  glucagon  Injectable 1 milliGRAM(s) IntraMuscular once PRN Glucose LESS THAN 70 milligrams/deciliter  glycopyrrolate Injectable 0.1 milliGRAM(s) IV Push every 8 hours PRN secreations  guaiFENesin    Syrup 100 milliGRAM(s) Oral every 6 hours PRN Cough  hydrALAZINE Injectable 10 milliGRAM(s) IV Push every 4 hours PRN sbp> 180  magnesium hydroxide Suspension 30 milliLiter(s) Oral daily PRN Constipation  ondansetron Injectable 4 milliGRAM(s) IV Push every 6 hours PRN Nausea and/or Vomiting      Allergies    Accupril (Other)        Vital Signs Last 24 Hrs  T(C): 37.3 (25 Mar 2019 07:55), Max: 37.3 (24 Mar 2019 15:00)  T(F): 99.2 (25 Mar 2019 07:55), Max: 99.2 (24 Mar 2019 15:00)  HR: 67 (25 Mar 2019 07:55) (64 - 75)  BP: 101/52 (25 Mar 2019 07:55) (101/52 - 132/80)  BP(mean): --  RR: 18 (25 Mar 2019 07:55) (18 - 20)  SpO2: 95% (25 Mar 2019 07:55) (90% - 98%)    PHYSICAL EXAM:    GENERAL: NAD  CHEST/LUNG: dec bs right base otherwise clear   HEART: Regular rate and rhythm; S1 S2  ABDOMEN: Soft,+BS   EXTREMITIES: no edema   NERVOUS SYSTEM:  Alert & Oriented X3 nonfocal.    LABS:                        7.8    6.1   )-----------( 146      ( 25 Mar 2019 08:28 )             23.3     03-25    143  |  98  |  65.0<H>  ----------------------------<  47<LL>  4.2   |  34.0<H>  |  1.14    Ca    8.4<L>      25 Mar 2019 08:28            CAPILLARY BLOOD GLUCOSE      POCT Blood Glucose.: 297 mg/dL (25 Mar 2019 12:34)  POCT Blood Glucose.: 176 mg/dL (25 Mar 2019 11:11)  POCT Blood Glucose.: 69 mg/dL (25 Mar 2019 08:03)  POCT Blood Glucose.: 61 mg/dL (25 Mar 2019 07:24)  POCT Blood Glucose.: 58 mg/dL (25 Mar 2019 07:23)  POCT Blood Glucose.: 232 mg/dL (24 Mar 2019 21:11)  POCT Blood Glucose.: 216 mg/dL (24 Mar 2019 16:19)        RADIOLOGY & ADDITIONAL TESTS:

## 2019-03-25 NOTE — PROGRESS NOTE ADULT - SUBJECTIVE AND OBJECTIVE BOX
NEPHROLOGY INTERVAL HPI/OVERNIGHT EVENTS:    No new events.    MEDICATIONS  (STANDING):  ALBUTerol    0.083% 10 milliGRAM(s) Nebulizer once  amLODIPine   Tablet 5 milliGRAM(s) Oral daily  ascorbic acid 500 milliGRAM(s) Oral daily  aspirin  chewable 81 milliGRAM(s) Oral daily  atorvastatin 80 milliGRAM(s) Oral at bedtime  carvedilol 6.25 milliGRAM(s) Oral every 12 hours  dextrose 5%. 1000 milliLiter(s) (50 mL/Hr) IV Continuous <Continuous>  dextrose 50% Injectable 12.5 Gram(s) IV Push once  dextrose 50% Injectable 25 Gram(s) IV Push once  dextrose 50% Injectable 25 Gram(s) IV Push once  docusate sodium 100 milliGRAM(s) Oral two times a day  DULoxetine 60 milliGRAM(s) Oral daily  enoxaparin Injectable 40 milliGRAM(s) SubCutaneous daily  epoetin sara Injectable 99866 Unit(s) SubCutaneous <User Schedule>  furosemide   Injectable 60 milliGRAM(s) IV Push every 12 hours  gabapentin 100 milliGRAM(s) Oral three times a day  insulin glargine Injectable (LANTUS) 30 Unit(s) SubCutaneous at bedtime  insulin lispro (HumaLOG) corrective regimen sliding scale   SubCutaneous Before meals and at bedtime  insulin lispro Injectable (HumaLOG) 5 Unit(s) SubCutaneous three times a day before meals  isosorbide   mononitrate ER Tablet (IMDUR) 60 milliGRAM(s) Oral daily  losartan 25 milliGRAM(s) Oral daily  magnesium oxide 400 milliGRAM(s) Oral daily  methylPREDNISolone sodium succinate Injectable 40 milliGRAM(s) IV Push every 12 hours  Nephro-riya 1 Tablet(s) Oral daily  pantoprazole    Tablet 40 milliGRAM(s) Oral before breakfast  polyethylene glycol 3350 17 Gram(s) Oral daily  senna 2 Tablet(s) Oral at bedtime  tamsulosin 0.4 milliGRAM(s) Oral at bedtime    MEDICATIONS  (PRN):  acetaminophen   Tablet .. 650 milliGRAM(s) Oral every 6 hours PRN Mild Pain (1 - 3)  ALBUTerol/ipratropium for Nebulization 3 milliLiter(s) Nebulizer every 6 hours PRN Shortness of Breath and/or Wheezing  dextrose 40% Gel 15 Gram(s) Oral once PRN Blood Glucose LESS THAN 70 milliGRAM(s)/deciliter  glucagon  Injectable 1 milliGRAM(s) IntraMuscular once PRN Glucose LESS THAN 70 milligrams/deciliter  glycopyrrolate Injectable 0.1 milliGRAM(s) IV Push every 8 hours PRN secreations  guaiFENesin    Syrup 100 milliGRAM(s) Oral every 6 hours PRN Cough  hydrALAZINE Injectable 10 milliGRAM(s) IV Push every 4 hours PRN sbp> 180  ondansetron Injectable 4 milliGRAM(s) IV Push every 6 hours PRN Nausea and/or Vomiting      Allergies    Accupril (Other)      Vital Signs Last 24 Hrs  T(C): 37.3 (25 Mar 2019 07:55), Max: 37.3 (24 Mar 2019 15:00)  T(F): 99.2 (25 Mar 2019 07:55), Max: 99.2 (24 Mar 2019 15:00)  HR: 67 (25 Mar 2019 07:55) (64 - 75)  BP: 101/52 (25 Mar 2019 07:55) (101/52 - 132/80)  BP(mean): --  RR: 18 (25 Mar 2019 07:55) (18 - 20)  SpO2: 95% (25 Mar 2019 07:55) (90% - 98%)  T(C): 37 (22 Mar 2019 07:47), Max: 37 (22 Mar 2019 07:47)  T(F): 98.6 (22 Mar 2019 07:47), Max: 98.6 (22 Mar 2019 07:47)  HR: 70 (22 Mar 2019 07:47) (67 - 82)  BP: 115/56 (22 Mar 2019 07:47) (99/63 - 138/72)  BP(mean): --  RR: 19 (22 Mar 2019 07:47) (18 - 20)  SpO2: 96% (22 Mar 2019 07:47) (87% - 100%)  T(C): 36.6 (21 Mar 2019 15:25), Max: 36.9 (20 Mar 2019 21:14)  T(F): 97.9 (21 Mar 2019 15:25), Max: 98.4 (20 Mar 2019 21:14)  HR: 78 (21 Mar 2019 15:38) (68 - 78)  BP: 110/58 (21 Mar 2019 15:25) (93/55 - 144/64)  BP(mean): --  RR: 18 (21 Mar 2019 15:25) (16 - 19)  SpO2: 89% (21 Mar 2019 15:38) (87% - 94%)      PHYSICAL EXAM:  Cardiovascular:  No rub, sternal scar   Respiratory: Clear lungs with decreased BS at bases	  Gastrointestinal:  Soft, Non-tender, + BS	  Extremities: no lower leg edema but has stasis changes   neg      LABS:    03-25    143  |  98  |  65.0<H>  ----------------------------<  47<LL>  4.2   |  34.0<H>  |  1.14    Ca    8.4<L>      25 Mar 2019 08:28                            8.0    5.6   )-----------( 125      ( 21 Mar 2019 05:53 )             23.8     03-21    144  |  100  |  71.0<H>  ----------------------------<  106  4.5   |  33.0<H>  |  1.12    Ca    8.5<L>      21 Mar 2019 05:53  Phos  2.1     03-21  Mg     2.2     03-21          Magnesium, Serum: 2.2 mg/dL (03-21 @ 05:53)  Phosphorus Level, Serum: 2.1 mg/dL (03-21 @ 05:53)          RADIOLOGY & ADDITIONAL TESTS:

## 2019-03-25 NOTE — PROGRESS NOTE ADULT - ASSESSMENT
Chest CT with R>L effusions with b/l GGO  Echo with mod MR with mod to severe TR and mod pulm HTN  Chronic hypercapnic respiratory failure  CRI  Pulm HTN likely related to CAD and VHD  Likely KYREE/OHS  CAD s/p CABG and PCI  PAD s/p baloon angioplasty  H/o cardiac arrest  CHFpEF  Diastolic dysfunction  Prior pleural effusion s/p drainage  Legally blind    Plan:    Nocturnal BiPAP  Diurese as tolerates  Optimize cardiac function  On heparin for DVT prophylaxis  On PPI for GI prophylaxis  Albuterol as needed  Mobilize as able  Likely eventual SINGH with NIV  Follow CXR for improvement of CHF; clinically has improved  Pulm f/u with PFTs as outpt

## 2019-03-25 NOTE — PROGRESS NOTE ADULT - ATTENDING COMMENTS
CHF- right sided- now on lasix 60 mg IV bid.   Continued poor prognosis.   On appropriate meds otherwise.   Will check BNP.   Will follow.
Continue present meds, ambulate with PT.
D/C bumex drip start po torsemide , follow up labs.
Epogen, rehab.
IV antibiotic, continue diuretic iv, chest x-ray, midodrine adjusted. Follow up labs in am.
IVANNA today and tomorrow. PT.
Russo, diuretic, labs.
follow post Tx PRBC in am
Pt seen and examined by [facility] PA, I have seen and evaluated this patient with PA and independently and agree with the above findings except as follows: Right heart failure admitted last month to hospital and has complicated hospital course. Pt was on continuous BIPAP for 3 days and family rescinded directives for DNR/I. Pt restarted on steroids and it appears she will rally though, has been able to come off BIPAP and wear nasal cannula all day. Continued management of heart failure, Overall prognosis extremely poor. Pt has improved to the point of moving out of ICU.
> Goals of care - I had a lengthy conversation with pt's  at bedside.  We discussed the comorbid conditions, hospital care, and prognosis.  Total time spent on patient care discussion = 20 minutes. Pt to remain DNR/DNI with everything else done including pressors
Hand irrigation PRN
Problem List:   Severe right sided heart failure and severe pulmonary hypertension 2ry to [facility] ( PCWP 40 mmHg on last RHC)   obesity  CAD patent LIMA to LAD , MADHAVI x 2 to CX and OM1     Plan:   hypernatremic on torsemide  hold diuretics, oral free water.
Problem List:   Severe right sided heart failure and severe pulmonary hypertension 2ry to [facility] ( PCWP 40 mmHg on last RHC)   obesity  CAD patent LIMA to LAD , MADHAVI x 2 to CX and OM1     Plan:   will switch from lasix drip to torsemide 40 mg twice daily.
Improved.   Continue diuretics with lasix drip.   Consider stopping tomorrow. Change to po torsemide.
Pt Is seen and examined, feeling better, used Bipap last night, couseled to use bipap  qhs and prn , decrease solumedrol 40 q12   chest tube management as per Ct surgery   appreciate ID - c/w abx per ID   FS uncontrolled due to Steroid, tapering, c/w Lantus, humalog, change sliding scale as moderate   Gi bleeding - GI appreciated - s/p colonoscopy   f/u labs

## 2019-03-25 NOTE — CHART NOTE - NSCHARTNOTEFT_GEN_A_CORE
Source: Patient [X]  Family [X]   other [ ]    Current Diet:   Diet, Consistent Carbohydrate Renal/No Snacks:   High Fiber  DASH/TLC {Sodium & Cholesteral Restricted}  1200mL Fluid Restriction ([id number])  Supplement Feeding Modality:  Oral  Nepro Cans or Servings Per Day:  1       Frequency:  Three Times a day (03-22-19 @ 14:23)    Patient reports [ ] nausea  [ ] vomiting [ ] diarrhea [ ] constipation  [ ]chewing problems [ ] swallowing issues  [ ] other: denies    PO intake:  < 50% [ ]   50-75%  [ ]   %  [x]  other :    Source for PO intake [X] Patient [X] family [x] chart [x] staff [ ] other    Current Weight:   (3/23)   224lbs  (3/21)   187.3lbs  (3/20)   197.5lbs  (3/16)   210lbs  (3/8)     228lbs  (3/5)     246lbs  (2/19)   245lbs  (2/16)   210lbs    % Weight Change: unclear accuracy of weights, will continue to monitor. 1+ general edema noted    Pertinent Medications: MEDICATIONS  (STANDING):  ALBUTerol    0.083% 10 milliGRAM(s) Nebulizer once  amLODIPine   Tablet 5 milliGRAM(s) Oral daily  ascorbic acid 500 milliGRAM(s) Oral daily  aspirin  chewable 81 milliGRAM(s) Oral daily  atorvastatin 80 milliGRAM(s) Oral at bedtime  carvedilol 6.25 milliGRAM(s) Oral every 12 hours  dextrose 5%. 1000 milliLiter(s) (50 mL/Hr) IV Continuous <Continuous>  dextrose 50% Injectable 12.5 Gram(s) IV Push once  dextrose 50% Injectable 25 Gram(s) IV Push once  dextrose 50% Injectable 25 Gram(s) IV Push once  docusate sodium 100 milliGRAM(s) Oral two times a day  DULoxetine 60 milliGRAM(s) Oral daily  enoxaparin Injectable 40 milliGRAM(s) SubCutaneous daily  epoetin sara Injectable 76028 Unit(s) SubCutaneous <User Schedule>  gabapentin 100 milliGRAM(s) Oral three times a day  insulin glargine Injectable (LANTUS) 20 Unit(s) SubCutaneous at bedtime  insulin lispro (HumaLOG) corrective regimen sliding scale   SubCutaneous Before meals and at bedtime  insulin lispro Injectable (HumaLOG) 5 Unit(s) SubCutaneous three times a day before meals  isosorbide   mononitrate ER Tablet (IMDUR) 60 milliGRAM(s) Oral daily  losartan 25 milliGRAM(s) Oral daily  magnesium oxide 400 milliGRAM(s) Oral daily  Nephro-riya 1 Tablet(s) Oral daily  pantoprazole    Tablet 40 milliGRAM(s) Oral before breakfast  polyethylene glycol 3350 17 Gram(s) Oral daily  predniSONE   Tablet 20 milliGRAM(s) Oral daily  senna 2 Tablet(s) Oral at bedtime  tamsulosin 0.4 milliGRAM(s) Oral at bedtime  torsemide 40 milliGRAM(s) Oral two times a day    MEDICATIONS  (PRN):  acetaminophen   Tablet .. 650 milliGRAM(s) Oral every 6 hours PRN Mild Pain (1 - 3)  ALBUTerol/ipratropium for Nebulization 3 milliLiter(s) Nebulizer every 6 hours PRN Shortness of Breath and/or Wheezing  bisacodyl 5 milliGRAM(s) Oral every 12 hours PRN Constipation  dextrose 40% Gel 15 Gram(s) Oral once PRN Blood Glucose LESS THAN 70 milliGRAM(s)/deciliter  glucagon  Injectable 1 milliGRAM(s) IntraMuscular once PRN Glucose LESS THAN 70 milligrams/deciliter  glycopyrrolate Injectable 0.1 milliGRAM(s) IV Push every 8 hours PRN secreations  guaiFENesin    Syrup 100 milliGRAM(s) Oral every 6 hours PRN Cough  hydrALAZINE Injectable 10 milliGRAM(s) IV Push every 4 hours PRN sbp> 180  magnesium hydroxide Suspension 30 milliLiter(s) Oral daily PRN Constipation  ondansetron Injectable 4 milliGRAM(s) IV Push every 6 hours PRN Nausea and/or Vomiting    Pertinent Labs: CBC Full  -  ( 25 Mar 2019 08:28 )  WBC Count : 6.1 K/uL  Hemoglobin : 7.8 g/dL  Hematocrit : 23.3 %  Platelet Count - Automated : 146 K/uL  Mean Cell Volume : 90.3 fl  Mean Cell Hemoglobin : 30.2 pg  Mean Cell Hemoglobin Concentration : 33.5 g/dL  03-25 Na143 mmol/L Glu 47 mg/dL<LL> K+ 4.2 mmol/L Cr  1.14 mg/dL BUN 65.0 mg/dL<H> Phos n/a   Alb n/a   PAB n/a       Skin: R heel diabetic ulcer, healing Stage II coccyx IAD    Nutrition focused physical exam conducted - found signs of malnutrition [ ]absent [x ]present    Subcutaneous fat loss: [ ] Orbital fat pads region, [ ]Buccal fat region, [ ]Triceps region,  [ ]Ribs region    Muscle wasting: [x ]Temples region, [ ]Clavicle region, [ ]Shoulder region, [ ]Scapula region, [ ]Interosseous region,  [ ]thigh region, [ ]Calf region    Estimated Needs:   [X] no change since previous assessment- will continue to monitor weights  [ ] recalculated:     Current Nutrition Diagnosis: Pt remains at high nutrition risk secondary to moderate (acute) protein-calorie malnutrition related to inadequate protein-energy intake in the setting of worsening B/L lung infiltrates and reaccumulation of pleural effusion and CHF/pulmonary edema (on and off NPO status as evidenced by +fluid accumulation, Meeting less then 50% estimated nutrition needs >5 days and physical signs of  muscle mass loss in temple region. Pt reports appetite is good, reports being hungry for lunch. This AM Pt with episode of hypoglycemia, per chart critical value SMBG 47mg/dL. Per RN, Lantus was adjusted, RD Rx to provide bedtime snack to prevent further episodes. RD to remain available.      Recommendations:   1) Change diet to cons CHO Renal w/evening snack, 1200mL fluid restriction per MD, Nepro TID   2) Continue with Nepro-riya   3) Provide encouragement/assistance as needed during mealtimes to inc PO   4) Monitor weights daily for trend    Monitoring and Evaluation:   [X] PO intake [X] Tolerance to diet prescription [X] Weights  [X] Follow up per protocol [X] Labs: Source: Patient [X]  Family [X]   other [ ]    Current Diet:   Diet, Consistent Carbohydrate Renal/No Snacks:   High Fiber  DASH/TLC {Sodium & Cholesteral Restricted}  1200mL Fluid Restriction ([id number])  Supplement Feeding Modality:  Oral  Nepro Cans or Servings Per Day:  1       Frequency:  Three Times a day (03-22-19 @ 14:23)    Patient reports [ ] nausea  [ ] vomiting [ ] diarrhea [ ] constipation  [ ]chewing problems [ ] swallowing issues  [ ] other: denies    PO intake:  < 50% [ ]   50-75%  [ ]   %  [x]  other :    Source for PO intake [X] Patient [X] family [x] chart [x] staff [ ] other    Current Weight:   (3/23)   224lbs  (3/21)   187.3lbs  (3/20)   197.5lbs  (3/16)   210lbs  (3/8)     228lbs  (3/5)     246lbs  (2/19)   245lbs  (2/16)   210lbs    % Weight Change: unclear accuracy of weights, will continue to monitor. 1+ general edema noted    Pertinent Medications: MEDICATIONS  (STANDING):  ALBUTerol    0.083% 10 milliGRAM(s) Nebulizer once  amLODIPine   Tablet 5 milliGRAM(s) Oral daily  ascorbic acid 500 milliGRAM(s) Oral daily  aspirin  chewable 81 milliGRAM(s) Oral daily  atorvastatin 80 milliGRAM(s) Oral at bedtime  carvedilol 6.25 milliGRAM(s) Oral every 12 hours  dextrose 5%. 1000 milliLiter(s) (50 mL/Hr) IV Continuous <Continuous>  dextrose 50% Injectable 12.5 Gram(s) IV Push once  dextrose 50% Injectable 25 Gram(s) IV Push once  dextrose 50% Injectable 25 Gram(s) IV Push once  docusate sodium 100 milliGRAM(s) Oral two times a day  DULoxetine 60 milliGRAM(s) Oral daily  enoxaparin Injectable 40 milliGRAM(s) SubCutaneous daily  epoetin sara Injectable 88469 Unit(s) SubCutaneous <User Schedule>  gabapentin 100 milliGRAM(s) Oral three times a day  insulin glargine Injectable (LANTUS) 20 Unit(s) SubCutaneous at bedtime  insulin lispro (HumaLOG) corrective regimen sliding scale   SubCutaneous Before meals and at bedtime  insulin lispro Injectable (HumaLOG) 5 Unit(s) SubCutaneous three times a day before meals  isosorbide   mononitrate ER Tablet (IMDUR) 60 milliGRAM(s) Oral daily  losartan 25 milliGRAM(s) Oral daily  magnesium oxide 400 milliGRAM(s) Oral daily  Nephro-riya 1 Tablet(s) Oral daily  pantoprazole    Tablet 40 milliGRAM(s) Oral before breakfast  polyethylene glycol 3350 17 Gram(s) Oral daily  predniSONE   Tablet 20 milliGRAM(s) Oral daily  senna 2 Tablet(s) Oral at bedtime  tamsulosin 0.4 milliGRAM(s) Oral at bedtime  torsemide 40 milliGRAM(s) Oral two times a day    MEDICATIONS  (PRN):  acetaminophen   Tablet .. 650 milliGRAM(s) Oral every 6 hours PRN Mild Pain (1 - 3)  ALBUTerol/ipratropium for Nebulization 3 milliLiter(s) Nebulizer every 6 hours PRN Shortness of Breath and/or Wheezing  bisacodyl 5 milliGRAM(s) Oral every 12 hours PRN Constipation  dextrose 40% Gel 15 Gram(s) Oral once PRN Blood Glucose LESS THAN 70 milliGRAM(s)/deciliter  glucagon  Injectable 1 milliGRAM(s) IntraMuscular once PRN Glucose LESS THAN 70 milligrams/deciliter  glycopyrrolate Injectable 0.1 milliGRAM(s) IV Push every 8 hours PRN secreations  guaiFENesin    Syrup 100 milliGRAM(s) Oral every 6 hours PRN Cough  hydrALAZINE Injectable 10 milliGRAM(s) IV Push every 4 hours PRN sbp> 180  magnesium hydroxide Suspension 30 milliLiter(s) Oral daily PRN Constipation  ondansetron Injectable 4 milliGRAM(s) IV Push every 6 hours PRN Nausea and/or Vomiting    Pertinent Labs: CBC Full  -  ( 25 Mar 2019 08:28 )  WBC Count : 6.1 K/uL  Hemoglobin : 7.8 g/dL  Hematocrit : 23.3 %  Platelet Count - Automated : 146 K/uL  Mean Cell Volume : 90.3 fl  Mean Cell Hemoglobin : 30.2 pg  Mean Cell Hemoglobin Concentration : 33.5 g/dL  03-25 Na143 mmol/L Glu 47 mg/dL<LL> K+ 4.2 mmol/L Cr  1.14 mg/dL BUN 65.0 mg/dL<H> Phos n/a   Alb n/a   PAB n/a       Skin: R heel diabetic ulcer, healing Stage II coccyx IAD    Nutrition focused physical exam conducted - found signs of malnutrition [ ]absent [x ]present    Subcutaneous fat loss: [ ] Orbital fat pads region, [ ]Buccal fat region, [ ]Triceps region,  [ ]Ribs region    Muscle wasting: [x ]Temples region, [ ]Clavicle region, [ ]Shoulder region, [ ]Scapula region, [ ]Interosseous region,  [ ]thigh region, [ ]Calf region    Estimated Needs:   [X] no change since previous assessment- will continue to monitor weights  [ ] recalculated:     Current Nutrition Diagnosis: Pt remains at high nutrition risk secondary to moderate (acute) protein-calorie malnutrition related to inadequate protein-energy intake in the setting of worsening B/L lung infiltrates and reaccumulation of pleural effusion and CHF/pulmonary edema (on and off NPO status as evidenced by +fluid accumulation, Meeting less then 50% estimated nutrition needs >5 days and physical signs of  muscle mass loss in temple region. Pt reports appetite is good, reports being hungry for lunch. This AM Pt with episode of hypoglycemia, per chart critical value SMBG 47mg/dL. Per RN, Lantus was adjusted, RD Rx to provide bedtime snack to prevent further episodes. RD to remain available.      Recommendations:   1) Change diet to cons CHO w/evening snack., DASH/TLC, 1200mL fluid restriction per MD, Glucerna TID   2) Continue with Nepro-riya   3) Provide encouragement/assistance as needed during mealtimes to inc PO   4) Monitor weights daily for trend    Monitoring and Evaluation:   [X] PO intake [X] Tolerance to diet prescription [X] Weights  [X] Follow up per protocol [X] Labs:

## 2019-03-25 NOTE — PROGRESS NOTE ADULT - ASSESSMENT
64 yo presenting to ED w/ SOB 2/2 diastolic HF exacerbation and noted to have KEITH and hyperkalemia, PMH CAD s/p CABG and PCI, PAD s/p baloon angioplasty, DM2, HTN, HLD, hx of cardiac arrest, recent GI bleed last month, Diastolic CHF, legally blind, pleural effusion requiring thoracentesis in Dec 2018, admitted for SOB, noted to have pleural effusion, started on bumex, s/p RRT noted on 3/1 for hypotension & lethargy. Pt became hypotensive after Bumex with albumin was given.  CXR with worsening plum edema b/l. BiPAP placed. ICU consult called for vasopressor support during IV diuresis. Midodrine ordered. She became increasingly lethargic with ABG showing resp acidosis with hypoxia. Pulmonary team has been following, she was transferred in ICU as she was having worsening respiratory failure, in ICU she was given High dose steroids as there is possibility of cryptogenic Pneumonia and she was also continued with IV lasix, she improved and was weaned off BiPAP and transfered under Hospitalist service again, her steroids has been tapered down, would require slow taper as per pulmonary, discussed with cardiology will change her lasix 60mg IV BID to torsemide 40mg BID, will wean her off from the supplemental oxygen, she has an episode of hemoptysis early during the course of hospitalization, no more episode, she has UTI and she has completed course of invanz, seen and followed by ID, she has Hx of dysphagia, speech eval appreciated, recommend regular diet with thin liquid, continue with aspiration precautions.   During the course she was also noted to have urine retention, she was given fontenot's cathter and was started on flomax, there was some blood in the urine as well, she was seen by urology, UA was unremarkable, she was given voiding trial and she passed and is able to void, she has no more hematuria.     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      possible component of cryptogenic organizing pna--treated with steroids, currently on taper.      c/w BIPAP      cardio/pulm following.    > Hemoptysis: resolved.   per pt present for several days, no recurrence noted on 3/13, ? related to superficial lac in trachea from use of around the clock o2  h/h stable, resume asa + dvt ppx for now, serial h/h, repeat ct chest reviewed, no acute findings that require intervention noted  pulm fu appreciated, no more episode.     > Acute on Chronic Hypercapnic Respiratory Failure due to OHS/KYREE.   stable, Pulm recommend chronic NIV for chronic hypercarbic respiratory failure to improve symptoms and to decrease hospitalizations  c/w Bipap as needed and nocturnal, Eventual SINGH with NIV, c/w steroid taper to PO, avoid Narcotics for pain, improving now, will wean off supplemental oxygen    > RT PTX  - small lesion, noted on CT  - sp chest tube placement + removal  - resolved    >UTI - appreciate ID input, sp completed course of invanz, resolved    >Dysphagia - had RRT due to aspiration, speech eval appreciated - recommend regular diet with thin liquid, aspiration precautions    >Chest pain with hx of CAD & CABG/PCI-  sp PRBC to improve oxygen to myocardium  c/w Imdur, BB, statin  ASA as above   Plavix on hold since last admission due to GI Bleed.     > Lower GI Bleed likely diverticular:   - s/p 1 unit of PRBC, well tolerated, uncomplicated  - s/p Colonoscopy - Poor prep throughout the colon. Diverticulosis seen. Unable to visualize colon sufficiently  No further GI w/u recommended,   cleared by GI to use asa but current use as above  Plavix yet on hold, outpt F/U    >Hyperkalemia  - resolved  - Holding Losartan, resume as needed for HF + bp control    > Acute on chronic kidney injury - resolved  appreciate renal input - - cont Torsemide ==> will need to tolerate azotemia,   resumed small dose of losartan, repeat BMP is stable   f/u BMP     > CAD complicated by stents in 2014 / PAD / HLD / HTN  - Continue, Imdur, Lipitor, Coreg and Norvasc   - Plavix on hold since last admission due to GI Bleed.     > DM 2 not on long term insulin complicated by asymptomatic hyperglycemia  - HbA1c 5.6, goal < 8, her lantus was increase to 30units and on humlog 5 along with sliding scale, previous she was on   - Lantus at 10 units at bedtime plus Humalog 3U, it was increase due to high dose steriods, will decrease with tappering down of steriods.   - c/w moderate sliding scale    > Anemia  - Multifactorial (chronic, complicated with acute gi bleeding during admit)  - s/p Venofer   - Continue Procrit  +1 U PRBC today with LAsix   - Monitor CBC  - per renal goal hgb 10.   check h/h in am--may need transfusion     > Transaminitis- resolved  - Likely secondary to liver congestion    > History of Depression  - chronic, stable, uncomplicated  - Continue Duloxetine 60 mg    >B/L UE swelling  - Arm elevation  - Warm compresses   - No DVT, resolved.      >Obesity in setting of Acute Moderate Protein Calorie Malnutrition  bmi 36.1  Nepro TID  Neprovite and Vit C 500mg daily  daily wt.     hematuria: Urology consulted, patient has fontenot's catheter, hematuria resolved, she passed voiding trial,     >Goals of care - palliative on board, she is full code.     prognosis guarded, case discussed w/ palliative      DISCHARGE ANANTH IF APPROPRIATE RESPONSE TO PRBC

## 2019-03-25 NOTE — PROGRESS NOTE ADULT - SUBJECTIVE AND OBJECTIVE BOX
PULMONARY PROGRESS NOTE      KUN ROCHA  MRN-[id number]    Patient is a 63y old  Female who presents with a chief complaint of SOB (25 Mar 2019 13:28)      INTERVAL HPI/OVERNIGHT EVENTS:    Patient is awake and alert  Feels better  Less SOB    MEDICATIONS  (STANDING):  ALBUTerol    0.083% 10 milliGRAM(s) Nebulizer once  amLODIPine   Tablet 5 milliGRAM(s) Oral daily  ascorbic acid 500 milliGRAM(s) Oral daily  aspirin  chewable 81 milliGRAM(s) Oral daily  atorvastatin 80 milliGRAM(s) Oral at bedtime  carvedilol 6.25 milliGRAM(s) Oral every 12 hours  dextrose 5%. 1000 milliLiter(s) (50 mL/Hr) IV Continuous <Continuous>  dextrose 50% Injectable 12.5 Gram(s) IV Push once  dextrose 50% Injectable 25 Gram(s) IV Push once  dextrose 50% Injectable 25 Gram(s) IV Push once  docusate sodium 100 milliGRAM(s) Oral two times a day  DULoxetine 60 milliGRAM(s) Oral daily  enoxaparin Injectable 40 milliGRAM(s) SubCutaneous daily  epoetin sara Injectable 64629 Unit(s) SubCutaneous <User Schedule>  gabapentin 100 milliGRAM(s) Oral three times a day  insulin glargine Injectable (LANTUS) 20 Unit(s) SubCutaneous at bedtime  insulin lispro (HumaLOG) corrective regimen sliding scale   SubCutaneous Before meals and at bedtime  insulin lispro Injectable (HumaLOG) 5 Unit(s) SubCutaneous three times a day before meals  isosorbide   mononitrate ER Tablet (IMDUR) 60 milliGRAM(s) Oral daily  losartan 25 milliGRAM(s) Oral daily  magnesium oxide 400 milliGRAM(s) Oral daily  Nephro-riya 1 Tablet(s) Oral daily  pantoprazole    Tablet 40 milliGRAM(s) Oral before breakfast  polyethylene glycol 3350 17 Gram(s) Oral daily  predniSONE   Tablet 20 milliGRAM(s) Oral daily  senna 2 Tablet(s) Oral at bedtime  tamsulosin 0.4 milliGRAM(s) Oral at bedtime  torsemide 20 milliGRAM(s) Oral two times a day      MEDICATIONS  (PRN):  acetaminophen   Tablet .. 650 milliGRAM(s) Oral every 6 hours PRN Mild Pain (1 - 3)  ALBUTerol/ipratropium for Nebulization 3 milliLiter(s) Nebulizer every 6 hours PRN Shortness of Breath and/or Wheezing  bisacodyl 5 milliGRAM(s) Oral every 12 hours PRN Constipation  dextrose 40% Gel 15 Gram(s) Oral once PRN Blood Glucose LESS THAN 70 milliGRAM(s)/deciliter  glucagon  Injectable 1 milliGRAM(s) IntraMuscular once PRN Glucose LESS THAN 70 milligrams/deciliter  glycopyrrolate Injectable 0.1 milliGRAM(s) IV Push every 8 hours PRN secreations  guaiFENesin    Syrup 100 milliGRAM(s) Oral every 6 hours PRN Cough  hydrALAZINE Injectable 10 milliGRAM(s) IV Push every 4 hours PRN sbp> 180  magnesium hydroxide Suspension 30 milliLiter(s) Oral daily PRN Constipation  ondansetron Injectable 4 milliGRAM(s) IV Push every 6 hours PRN Nausea and/or Vomiting      Allergies    Accupril (Other)    Intolerances        PAST MEDICAL & SURGICAL HISTORY:  Herniated disc, cervical  PAD (peripheral artery disease)  Legally blind  History of peripheral vascular disease  History of retinal detachment  History of CEA (carotid endarterectomy): Right  Hyperlipidemia  Glaucoma  Hypertension  Diabetes  S/P CABG x 1  After cataract  Uveitic glaucoma of both eyes  Detached retina  Atherosclerosis of right carotid artery   delivery delivered        REVIEW OF SYSTEMS:    CONSTITUTIONAL:  No distress    HEENT:  Eyes:  No diplopia or blurred vision. ENT:  No earache, sore throat or runny nose.    CARDIOVASCULAR:  No pressure, squeezing, tightness, heaviness or aching about the chest; no palpitations.    RESPIRATORY:  Improved cough, shortness of breath, no PND or orthopnea. Mild SOBOE    GASTROINTESTINAL:  No nausea, vomiting or diarrhea.    GENITOURINARY:  No dysuria, frequency or urgency.    NEUROLOGIC:  No paresthesias, fasciculations, seizures or weakness.    PSYCHIATRIC:  No disorder of thought or mood.    Vital Signs Last 24 Hrs  T(C): 36.8 (25 Mar 2019 14:01), Max: 37.3 (25 Mar 2019 07:55)  T(F): 98.3 (25 Mar 2019 14:01), Max: 99.2 (25 Mar 2019 07:55)  HR: 72 (25 Mar 2019 14:01) (64 - 72)  BP: 118/64 (25 Mar 2019 14:01) (101/52 - 132/80)  BP(mean): --  RR: 18 (25 Mar 2019 14:01) (18 - 18)  SpO2: 95% (25 Mar 2019 14:01) (94% - 98%)    PHYSICAL EXAMINATION:    GENERAL: The patient is awake and alert in no apparent distress.     HEENT: Head is normocephalic and atraumatic. Extraocular muscles are intact. Mucous membranes are moist.    NECK: Supple.    LUNGS: Clear to auscultation without wheezing, rales or rhonchi; respirations unlabored    HEART: Regular rate and rhythm without murmur.    ABDOMEN: Soft, nontender, and nondistended.      EXTREMITIES: Without any cyanosis, clubbing, rash, with mild edema.    NEUROLOGIC: Grossly intact.    LABS:                        7.8    6.1   )-----------( 146      ( 25 Mar 2019 08:28 )             23.3     -    143  |  98  |  65.0<H>  ----------------------------<  47<LL>  4.2   |  34.0<H>  |  1.14    Ca    8.4<L>      25 Mar 2019 08:28        RADIOLOGY & ADDITIONAL STUDIES:       EXAM:  CT CHEST                          PROCEDURE DATE:  2019          INTERPRETATION:  Chest CT without contrast .  COMPARISON: Chest x-ray 3/19/2019. And chest CT scan 3/8/2019  CLINICAL INFORMATION: Diffuse pulmonary opacities/ARDS. Assess for   underlying malignancy or effusion..  TECHNIQUE: Contiguous axial 2.5 mm slice thickness images of the chest   were obtained without intravenous contrast administration.  FINDINGS:  There is a moderate-large RIGHT pleural effusion with RIGHT lower lobe of   compression basilar atelectasis. Fluid tracks along the major fissure to   the apex of lung. A diffuse of bilateral groundglass opacities are   throughout both lung parenchyma sparing the superior segment of the RIGHT   lower lobe.  There is gross cardiomegaly with coronary artery calcifications. No   pericardial effusion.  .    There are no mediastinal lymphadenopathy or masses.    .    Visualized upper abdominal viscera unremarkable.    Marginal osteophytes are noted at multiple disc spaces in the spine.     IMPRESSION:  No significant change compared to 3/8/2019 except for a diffuse   involvement of lung parenchyma with groundglass opacities.  Moderate-large RIGHT pleural effusion. Compression atelectasis RIGHT lung   base . Diffuse groundglass opacities . Gross cardiomegaly.  Differential diagnosis includes hypersensitivity pneumonitis. Alveolar   proteinosis, ARDS or atypical presentation of a chronic pulmonary edema   of cardiac or noncardiac origin with RIGHT effusion.        TATE PRESTON M.D., ATTENDING RADIOLOGIST  This document has been electronically signed. Mar 22 2019  5:06PM            ECHO:      Summary:   1. Technically suboptimal study.   2. Left ventricular ejection fraction, by visual estimation, is >75%.   3. Hyperdynamic global left ventricular systolic function.   4. There is mild concentric left ventricular hypertrophy.   5. Mild mitral annular calcification.   6. Moderate mitral valve regurgitation.   7. Thickening and calcification of the anterior and posterior mitral   valve leaflets.   8. Moderate-severe tricuspid regurgitation.   9. Estimated pulmonary artery systolic pressure is 55.7 mmHg assuming a   right atrial pressure of 5 mmHg, which is consistent with moderate   pulmonary hypertension.  10. Endocardial visualization was enhanced with intravenous echo contrast.    [id number] Andrew Torres MD, Electronically signed on 2/15/2019 at 6:14:21 PM

## 2019-03-26 ENCOUNTER — TRANSCRIPTION ENCOUNTER (OUTPATIENT)
Age: 64
End: 2019-03-26

## 2019-03-26 VITALS
SYSTOLIC BLOOD PRESSURE: 116 MMHG | RESPIRATION RATE: 19 BRPM | TEMPERATURE: 98 F | HEART RATE: 68 BPM | DIASTOLIC BLOOD PRESSURE: 56 MMHG

## 2019-03-26 LAB
ANION GAP SERPL CALC-SCNC: 9 MMOL/L — SIGNIFICANT CHANGE UP (ref 5–17)
BUN SERPL-MCNC: 65 MG/DL — HIGH (ref 8–20)
CALCIUM SERPL-MCNC: 8.6 MG/DL — SIGNIFICANT CHANGE UP (ref 8.6–10.2)
CHLORIDE SERPL-SCNC: 95 MMOL/L — LOW (ref 98–107)
CO2 SERPL-SCNC: 34 MMOL/L — HIGH (ref 22–29)
CREAT SERPL-MCNC: 1.01 MG/DL — SIGNIFICANT CHANGE UP (ref 0.5–1.3)
CULTURE RESULTS: SIGNIFICANT CHANGE UP
GLUCOSE BLDC GLUCOMTR-MCNC: 243 MG/DL — HIGH (ref 70–99)
GLUCOSE BLDC GLUCOMTR-MCNC: 74 MG/DL — SIGNIFICANT CHANGE UP (ref 70–99)
GLUCOSE SERPL-MCNC: 118 MG/DL — HIGH (ref 70–115)
HCT VFR BLD CALC: 28.1 % — LOW (ref 37–47)
HGB BLD-MCNC: 9.6 G/DL — LOW (ref 12–16)
MCHC RBC-ENTMCNC: 30.7 PG — SIGNIFICANT CHANGE UP (ref 27–31)
MCHC RBC-ENTMCNC: 34.2 G/DL — SIGNIFICANT CHANGE UP (ref 32–36)
MCV RBC AUTO: 89.8 FL — SIGNIFICANT CHANGE UP (ref 81–99)
PLATELET # BLD AUTO: 181 K/UL — SIGNIFICANT CHANGE UP (ref 150–400)
POTASSIUM SERPL-MCNC: 4.6 MMOL/L — SIGNIFICANT CHANGE UP (ref 3.5–5.3)
POTASSIUM SERPL-SCNC: 4.6 MMOL/L — SIGNIFICANT CHANGE UP (ref 3.5–5.3)
RBC # BLD: 3.13 M/UL — LOW (ref 4.4–5.2)
RBC # FLD: 17.8 % — HIGH (ref 11–15.6)
SODIUM SERPL-SCNC: 138 MMOL/L — SIGNIFICANT CHANGE UP (ref 135–145)
SPECIMEN SOURCE: SIGNIFICANT CHANGE UP
WBC # BLD: 7.2 K/UL — SIGNIFICANT CHANGE UP (ref 4.8–10.8)
WBC # FLD AUTO: 7.2 K/UL — SIGNIFICANT CHANGE UP (ref 4.8–10.8)

## 2019-03-26 PROCEDURE — 87086 URINE CULTURE/COLONY COUNT: CPT

## 2019-03-26 PROCEDURE — 85610 PROTHROMBIN TIME: CPT

## 2019-03-26 PROCEDURE — 86022 PLATELET ANTIBODIES: CPT

## 2019-03-26 PROCEDURE — P9047: CPT

## 2019-03-26 PROCEDURE — 84132 ASSAY OF SERUM POTASSIUM: CPT

## 2019-03-26 PROCEDURE — 96374 THER/PROPH/DIAG INJ IV PUSH: CPT

## 2019-03-26 PROCEDURE — 70450 CT HEAD/BRAIN W/O DYE: CPT

## 2019-03-26 PROCEDURE — 97530 THERAPEUTIC ACTIVITIES: CPT

## 2019-03-26 PROCEDURE — 87186 SC STD MICRODIL/AGAR DIL: CPT

## 2019-03-26 PROCEDURE — 80053 COMPREHEN METABOLIC PANEL: CPT

## 2019-03-26 PROCEDURE — 82272 OCCULT BLD FECES 1-3 TESTS: CPT

## 2019-03-26 PROCEDURE — 82550 ASSAY OF CK (CPK): CPT

## 2019-03-26 PROCEDURE — 82947 ASSAY GLUCOSE BLOOD QUANT: CPT

## 2019-03-26 PROCEDURE — 94640 AIRWAY INHALATION TREATMENT: CPT

## 2019-03-26 PROCEDURE — 87075 CULTR BACTERIA EXCEPT BLOOD: CPT

## 2019-03-26 PROCEDURE — 83880 ASSAY OF NATRIURETIC PEPTIDE: CPT

## 2019-03-26 PROCEDURE — 82803 BLOOD GASES ANY COMBINATION: CPT

## 2019-03-26 PROCEDURE — 83690 ASSAY OF LIPASE: CPT

## 2019-03-26 PROCEDURE — 87206 SMEAR FLUORESCENT/ACID STAI: CPT

## 2019-03-26 PROCEDURE — 86923 COMPATIBILITY TEST ELECTRIC: CPT

## 2019-03-26 PROCEDURE — 97116 GAIT TRAINING THERAPY: CPT

## 2019-03-26 PROCEDURE — 97110 THERAPEUTIC EXERCISES: CPT

## 2019-03-26 PROCEDURE — 84155 ASSAY OF PROTEIN SERUM: CPT

## 2019-03-26 PROCEDURE — 97163 PT EVAL HIGH COMPLEX 45 MIN: CPT

## 2019-03-26 PROCEDURE — 86803 HEPATITIS C AB TEST: CPT

## 2019-03-26 PROCEDURE — 76942 ECHO GUIDE FOR BIOPSY: CPT

## 2019-03-26 PROCEDURE — 82435 ASSAY OF BLOOD CHLORIDE: CPT

## 2019-03-26 PROCEDURE — 83615 LACTATE (LD) (LDH) ENZYME: CPT

## 2019-03-26 PROCEDURE — 87015 SPECIMEN INFECT AGNT CONCNTJ: CPT

## 2019-03-26 PROCEDURE — 84145 PROCALCITONIN (PCT): CPT

## 2019-03-26 PROCEDURE — 71045 X-RAY EXAM CHEST 1 VIEW: CPT

## 2019-03-26 PROCEDURE — 36415 COLL VENOUS BLD VENIPUNCTURE: CPT

## 2019-03-26 PROCEDURE — 82728 ASSAY OF FERRITIN: CPT

## 2019-03-26 PROCEDURE — 89051 BODY FLUID CELL COUNT: CPT

## 2019-03-26 PROCEDURE — 82570 ASSAY OF URINE CREATININE: CPT

## 2019-03-26 PROCEDURE — 83735 ASSAY OF MAGNESIUM: CPT

## 2019-03-26 PROCEDURE — 93971 EXTREMITY STUDY: CPT

## 2019-03-26 PROCEDURE — 87205 SMEAR GRAM STAIN: CPT

## 2019-03-26 PROCEDURE — 83550 IRON BINDING TEST: CPT

## 2019-03-26 PROCEDURE — 83540 ASSAY OF IRON: CPT

## 2019-03-26 PROCEDURE — 84156 ASSAY OF PROTEIN URINE: CPT

## 2019-03-26 PROCEDURE — 85014 HEMATOCRIT: CPT

## 2019-03-26 PROCEDURE — 84466 ASSAY OF TRANSFERRIN: CPT

## 2019-03-26 PROCEDURE — 87116 MYCOBACTERIA CULTURE: CPT

## 2019-03-26 PROCEDURE — 80076 HEPATIC FUNCTION PANEL: CPT

## 2019-03-26 PROCEDURE — 87102 FUNGUS ISOLATION CULTURE: CPT

## 2019-03-26 PROCEDURE — P9016: CPT

## 2019-03-26 PROCEDURE — 93970 EXTREMITY STUDY: CPT

## 2019-03-26 PROCEDURE — 82962 GLUCOSE BLOOD TEST: CPT

## 2019-03-26 PROCEDURE — 81001 URINALYSIS AUTO W/SCOPE: CPT

## 2019-03-26 PROCEDURE — 84157 ASSAY OF PROTEIN OTHER: CPT

## 2019-03-26 PROCEDURE — C1729: CPT

## 2019-03-26 PROCEDURE — 86901 BLOOD TYPING SEROLOGIC RH(D): CPT

## 2019-03-26 PROCEDURE — 84100 ASSAY OF PHOSPHORUS: CPT

## 2019-03-26 PROCEDURE — 84295 ASSAY OF SERUM SODIUM: CPT

## 2019-03-26 PROCEDURE — 92610 EVALUATE SWALLOWING FUNCTION: CPT

## 2019-03-26 PROCEDURE — 82945 GLUCOSE OTHER FLUID: CPT

## 2019-03-26 PROCEDURE — 87040 BLOOD CULTURE FOR BACTERIA: CPT

## 2019-03-26 PROCEDURE — 99285 EMERGENCY DEPT VISIT HI MDM: CPT | Mod: 25

## 2019-03-26 PROCEDURE — 84484 ASSAY OF TROPONIN QUANT: CPT

## 2019-03-26 PROCEDURE — 83605 ASSAY OF LACTIC ACID: CPT

## 2019-03-26 PROCEDURE — 36600 WITHDRAWAL OF ARTERIAL BLOOD: CPT

## 2019-03-26 PROCEDURE — 94660 CPAP INITIATION&MGMT: CPT

## 2019-03-26 PROCEDURE — 87070 CULTURE OTHR SPECIMN AEROBIC: CPT

## 2019-03-26 PROCEDURE — 85027 COMPLETE CBC AUTOMATED: CPT

## 2019-03-26 PROCEDURE — 93005 ELECTROCARDIOGRAM TRACING: CPT

## 2019-03-26 PROCEDURE — 36430 TRANSFUSION BLD/BLD COMPNT: CPT

## 2019-03-26 PROCEDURE — 99239 HOSP IP/OBS DSCHRG MGMT >30: CPT

## 2019-03-26 PROCEDURE — 86900 BLOOD TYPING SEROLOGIC ABO: CPT

## 2019-03-26 PROCEDURE — 93306 TTE W/DOPPLER COMPLETE: CPT

## 2019-03-26 PROCEDURE — 83986 ASSAY PH BODY FLUID NOS: CPT

## 2019-03-26 PROCEDURE — 80048 BASIC METABOLIC PNL TOTAL CA: CPT

## 2019-03-26 PROCEDURE — 86850 RBC ANTIBODY SCREEN: CPT

## 2019-03-26 PROCEDURE — 82330 ASSAY OF CALCIUM: CPT

## 2019-03-26 PROCEDURE — 83036 HEMOGLOBIN GLYCOSYLATED A1C: CPT

## 2019-03-26 PROCEDURE — 99232 SBSQ HOSP IP/OBS MODERATE 35: CPT

## 2019-03-26 PROCEDURE — 71250 CT THORAX DX C-: CPT

## 2019-03-26 PROCEDURE — 85730 THROMBOPLASTIN TIME PARTIAL: CPT

## 2019-03-26 PROCEDURE — 82042 OTHER SOURCE ALBUMIN QUAN EA: CPT

## 2019-03-26 RX ORDER — LOSARTAN POTASSIUM 100 MG/1
1 TABLET, FILM COATED ORAL
Qty: 0 | Refills: 0 | DISCHARGE
Start: 2019-03-26

## 2019-03-26 RX ORDER — MAGNESIUM OXIDE 400 MG ORAL TABLET 241.3 MG
1 TABLET ORAL
Qty: 0 | Refills: 0 | COMMUNITY

## 2019-03-26 RX ORDER — INSULIN GLARGINE 100 [IU]/ML
18 INJECTION, SOLUTION SUBCUTANEOUS
Qty: 0 | Refills: 0 | DISCHARGE
Start: 2019-03-26

## 2019-03-26 RX ORDER — ACETAMINOPHEN 500 MG
2 TABLET ORAL
Qty: 0 | Refills: 0 | DISCHARGE
Start: 2019-03-26

## 2019-03-26 RX ORDER — ASPIRIN/CALCIUM CARB/MAGNESIUM 324 MG
1 TABLET ORAL
Qty: 0 | Refills: 0 | DISCHARGE
Start: 2019-03-26

## 2019-03-26 RX ORDER — INSULIN LISPRO 100/ML
8 VIAL (ML) SUBCUTANEOUS
Qty: 0 | Refills: 0 | DISCHARGE
Start: 2019-03-26

## 2019-03-26 RX ORDER — TAMSULOSIN HYDROCHLORIDE 0.4 MG/1
1 CAPSULE ORAL
Qty: 0 | Refills: 0 | DISCHARGE
Start: 2019-03-26

## 2019-03-26 RX ORDER — ENOXAPARIN SODIUM 100 MG/ML
40 INJECTION SUBCUTANEOUS
Qty: 0 | Refills: 0 | DISCHARGE
Start: 2019-03-26

## 2019-03-26 RX ADMIN — ISOSORBIDE MONONITRATE 60 MILLIGRAM(S): 60 TABLET, EXTENDED RELEASE ORAL at 12:56

## 2019-03-26 RX ADMIN — AMLODIPINE BESYLATE 5 MILLIGRAM(S): 2.5 TABLET ORAL at 05:27

## 2019-03-26 RX ADMIN — LOSARTAN POTASSIUM 25 MILLIGRAM(S): 100 TABLET, FILM COATED ORAL at 05:27

## 2019-03-26 RX ADMIN — MAGNESIUM OXIDE 400 MG ORAL TABLET 400 MILLIGRAM(S): 241.3 TABLET ORAL at 12:56

## 2019-03-26 RX ADMIN — Medication 20 MILLIGRAM(S): at 05:27

## 2019-03-26 RX ADMIN — Medication 4: at 12:55

## 2019-03-26 RX ADMIN — DULOXETINE HYDROCHLORIDE 60 MILLIGRAM(S): 30 CAPSULE, DELAYED RELEASE ORAL at 12:54

## 2019-03-26 RX ADMIN — Medication 100 MILLIGRAM(S): at 05:27

## 2019-03-26 RX ADMIN — PANTOPRAZOLE SODIUM 40 MILLIGRAM(S): 20 TABLET, DELAYED RELEASE ORAL at 05:27

## 2019-03-26 RX ADMIN — GABAPENTIN 100 MILLIGRAM(S): 400 CAPSULE ORAL at 05:27

## 2019-03-26 RX ADMIN — Medication 1 TABLET(S): at 12:57

## 2019-03-26 RX ADMIN — Medication 81 MILLIGRAM(S): at 12:54

## 2019-03-26 RX ADMIN — CARVEDILOL PHOSPHATE 6.25 MILLIGRAM(S): 80 CAPSULE, EXTENDED RELEASE ORAL at 05:27

## 2019-03-26 RX ADMIN — Medication 5 UNIT(S): at 12:56

## 2019-03-26 RX ADMIN — Medication 500 MILLIGRAM(S): at 12:54

## 2019-03-26 RX ADMIN — GABAPENTIN 100 MILLIGRAM(S): 400 CAPSULE ORAL at 13:00

## 2019-03-26 NOTE — PROGRESS NOTE ADULT - PROVIDER SPECIALTY LIST ADULT
Anesthesia
CT Surgery
Cardiology
Critical Care
Gastroenterology
Hospitalist
Infectious Disease
Nephrology
Palliative Care
Pulmonology
Thoracic Surgery
Urology
Thoracic Surgery
CT Surgery
Hospitalist
Nephrology
Pulmonology
Pulmonology
Thoracic Surgery
Cardiology
Critical Care
Hospitalist
Palliative Care
Pulmonology
Pulmonology
Thoracic Surgery
Hospitalist
Hospitalist
Cardiology
Cardiology
Urology
Hospitalist

## 2019-03-26 NOTE — PROGRESS NOTE ADULT - REASON FOR ADMISSION
SOB

## 2019-03-26 NOTE — DISCHARGE NOTE PROVIDER - CARE PROVIDER_API CALL
[place])  Cardiovascular Disease; Interventional Cardiology  [address], [address]  [place] [address]  Phone: [phone]  Fax: [phone]  Follow Up Time:     Mayco Templeton)  Critical Care Medicine; Internal Medicine; Pulmonary Disease; Sleep Medicine  [address], [address]  [place] [address]  Phone: [phone]  Fax: [phone]  Follow Up Time:     Dylon Sawyer)  Internal Medicine; Nephrology  [address] [place] [address], [facility]  [place], [place] [id number]  Phone: [phone]  Fax: [phone]  Follow Up Time:

## 2019-03-26 NOTE — DISCHARGE NOTE NURSING/CASE MANAGEMENT/SOCIAL WORK - NSDCPEPT PROEDHF_GEN_ALL_CORE
Report signs and symptoms to primary care provider/Activities as tolerated/Call primary care provider for follow up after discharge/Low salt diet/Monitor weight daily

## 2019-03-26 NOTE — PROGRESS NOTE ADULT - ASSESSMENT
Chest CT with R>L effusions with b/l GGO  Echo with mod MR with mod to severe TR and mod pulm HTN  Chronic hypercapnic respiratory failure  CRI  Pulm HTN likely related to CAD and VHD  Likely KYREE/OHS  CAD s/p CABG and PCI  PAD s/p baloon angioplasty  H/o cardiac arrest  CHFpEF  Diastolic dysfunction  Prior pleural effusion s/p drainage  Legally blind    Plan:    Nocturnal BiPAP  Diurese as tolerates  Optimize cardiac function  On heparin for DVT prophylaxis  On PPI for GI prophylaxis  On drug nebs as needed  Taper prednisone as able  Mobilize as able  Likely eventual SINGH with NIV  Follow CXR for improvement of CHF; clinically has improved  Pulm f/u with PFTs as outpt

## 2019-03-26 NOTE — DISCHARGE NOTE PROVIDER - HOSPITAL COURSE
62 yo F with PMH CAD s/p CABG and PCI, PAD s/p balloon angioplasty, cardiac arrest, recent GI bleed last month, Diastolic CHF, pulmonary HTN legally blind, pleural effusion requiring thoracentesis in Dec 2018 presents to ER for shortness of breath.    patient underwent diuresis, decompensated and transferred to ICU for vasopressor support while diuresed and NIPPV. Improved and transferred to general medical floor.    Patient treated for suspected cryptogenic organizing pneumonia with high dose steroids and transitioned to oral prednisone with slow taper (5mg every month).  Continue BIPAP 24/8 nocturnal and PRN.  Plavix held since last admission due to GI bleed. to be restarted as outpatient by cardiology after evaluation.  Renal following patient for bipin and advised some degree of azotemia should be accepted.     Fingersticks elevated and improved with Lantus/premeal insulin.  Underwent aggressive diuresis and right pleural effusion drained with pigtail catheter per CT surgery.      Hospital course complicated by dysphagia, speech pathology recommending regular diet with thin liquids with aspiration precautions.  Treated for UTI with invanz. Also with episode of urinary retention s/p fontenot catheter and successful trial of void.  Episode of rectal bleeding, underwent colonoscopy with poor prep but diverticuli noted. hemoglobin stabilized after transfusion.    Initially patient was DNR/DNI but family rescinded and current full code, palliative care was great involved in patient's care.        stable for discharge to subacute rehabilitation.     dc planning 48 minutes.    vitals stable no acute complaints/events tolerating bipap.     aaox 3 nad rrr s1s2  dec bs right base soft abdomen. no LE edema.    nonfocal neuro. obese.        high risk for decompensation and re admission.

## 2019-03-26 NOTE — DISCHARGE NOTE PROVIDER - PROVIDER TOKENS
PROVIDER:[TOKEN:[[id number]]],PROVIDER:[TOKEN:[[id number]]],PROVIDER:[TOKEN:[[id number]]]

## 2019-03-26 NOTE — DISCHARGE NOTE PROVIDER - CARE PROVIDERS DIRECT ADDRESSES
[email or web address]_Unknown

## 2019-03-26 NOTE — DISCHARGE NOTE PROVIDER - NSDCCPCAREPLAN_GEN_ALL_CORE_FT
PRINCIPAL DISCHARGE DIAGNOSIS  Diagnosis: Acute on chronic diastolic heart failure  Assessment and Plan of Treatment: continue diuretics and BIPAP   follow up with cardiology and pulmonology within 2 week  follow up with nephrology in 1 weeks.   BUN/Cr at discharge 65/1.01      SECONDARY DISCHARGE DIAGNOSES  Diagnosis: Acute respiratory failure with hypoxia and hypercapnia  Assessment and Plan of Treatment:     Diagnosis: Pulmonary hypertension, moderate to severe  Assessment and Plan of Treatment:     Diagnosis: Cryptogenic organizing pneumonia  Assessment and Plan of Treatment:     Diagnosis: UTI (urinary tract infection)  Assessment and Plan of Treatment: UTI (urinary tract infection)    Diagnosis: KEITH (acute kidney injury)  Assessment and Plan of Treatment: KEITH (acute kidney injury)    Diagnosis: Pleural effusion  Assessment and Plan of Treatment: Pleural effusion    Diagnosis: Rectal bleeding  Assessment and Plan of Treatment: Rectal bleeding

## 2019-03-26 NOTE — DISCHARGE NOTE NURSING/CASE MANAGEMENT/SOCIAL WORK - NSDCDPATPORTLINK_GEN_ALL_CORE
You can access the [facility] Patient Portal, offered by [facility], by registering with the following website: [email or web address]

## 2019-03-26 NOTE — PROGRESS NOTE ADULT - SUBJECTIVE AND OBJECTIVE BOX
PULMONARY PROGRESS NOTE      KUN ROCHA  MRN-[id number]    Patient is a 63y old  Female who presents with a chief complaint of shortness of breath (26 Mar 2019 10:48)      INTERVAL HPI/OVERNIGHT EVENTS:    Patient is awake and alert  Feels better    MEDICATIONS  (STANDING):  ALBUTerol    0.083% 10 milliGRAM(s) Nebulizer once  amLODIPine   Tablet 5 milliGRAM(s) Oral daily  ascorbic acid 500 milliGRAM(s) Oral daily  aspirin  chewable 81 milliGRAM(s) Oral daily  atorvastatin 80 milliGRAM(s) Oral at bedtime  carvedilol 6.25 milliGRAM(s) Oral every 12 hours  dextrose 5%. 1000 milliLiter(s) (50 mL/Hr) IV Continuous <Continuous>  dextrose 50% Injectable 12.5 Gram(s) IV Push once  dextrose 50% Injectable 25 Gram(s) IV Push once  dextrose 50% Injectable 25 Gram(s) IV Push once  docusate sodium 100 milliGRAM(s) Oral two times a day  DULoxetine 60 milliGRAM(s) Oral daily  enoxaparin Injectable 40 milliGRAM(s) SubCutaneous daily  epoetin sara Injectable 30634 Unit(s) SubCutaneous <User Schedule>  gabapentin 100 milliGRAM(s) Oral three times a day  insulin glargine Injectable (LANTUS) 20 Unit(s) SubCutaneous at bedtime  insulin lispro (HumaLOG) corrective regimen sliding scale   SubCutaneous Before meals and at bedtime  insulin lispro Injectable (HumaLOG) 5 Unit(s) SubCutaneous three times a day before meals  isosorbide   mononitrate ER Tablet (IMDUR) 60 milliGRAM(s) Oral daily  losartan 25 milliGRAM(s) Oral daily  magnesium oxide 400 milliGRAM(s) Oral daily  Nephro-riya 1 Tablet(s) Oral daily  pantoprazole    Tablet 40 milliGRAM(s) Oral before breakfast  polyethylene glycol 3350 17 Gram(s) Oral daily  predniSONE   Tablet 20 milliGRAM(s) Oral daily  senna 2 Tablet(s) Oral at bedtime  tamsulosin 0.4 milliGRAM(s) Oral at bedtime  torsemide 20 milliGRAM(s) Oral two times a day      MEDICATIONS  (PRN):  acetaminophen   Tablet .. 650 milliGRAM(s) Oral every 6 hours PRN Mild Pain (1 - 3)  ALBUTerol/ipratropium for Nebulization 3 milliLiter(s) Nebulizer every 6 hours PRN Shortness of Breath and/or Wheezing  bisacodyl 5 milliGRAM(s) Oral every 12 hours PRN Constipation  dextrose 40% Gel 15 Gram(s) Oral once PRN Blood Glucose LESS THAN 70 milliGRAM(s)/deciliter  glucagon  Injectable 1 milliGRAM(s) IntraMuscular once PRN Glucose LESS THAN 70 milligrams/deciliter  glycopyrrolate Injectable 0.1 milliGRAM(s) IV Push every 8 hours PRN secreations  guaiFENesin    Syrup 100 milliGRAM(s) Oral every 6 hours PRN Cough  hydrALAZINE Injectable 10 milliGRAM(s) IV Push every 4 hours PRN sbp> 180  magnesium hydroxide Suspension 30 milliLiter(s) Oral daily PRN Constipation  ondansetron Injectable 4 milliGRAM(s) IV Push every 6 hours PRN Nausea and/or Vomiting      Allergies    Accupril (Other)    Intolerances        PAST MEDICAL & SURGICAL HISTORY:  Herniated disc, cervical  PAD (peripheral artery disease)  Legally blind  History of peripheral vascular disease  History of retinal detachment  History of CEA (carotid endarterectomy): Right  Hyperlipidemia  Glaucoma  Hypertension  Diabetes  S/P CABG x 1  After cataract  Uveitic glaucoma of both eyes  Detached retina  Atherosclerosis of right carotid artery   delivery delivered        REVIEW OF SYSTEMS:    CONSTITUTIONAL:  No distress    HEENT:  Eyes:  No diplopia or blurred vision. ENT:  No earache, sore throat or runny nose.    CARDIOVASCULAR:  No pressure, squeezing, tightness, heaviness or aching about the chest; no palpitations.    RESPIRATORY:  Improved cough, shortness of breath, no PND or orthopnea. Mild SOBOE    GASTROINTESTINAL:  No nausea, vomiting or diarrhea.    GENITOURINARY:  No dysuria, frequency or urgency.    NEUROLOGIC:  No paresthesias, fasciculations, seizures or weakness.    PSYCHIATRIC:  No disorder of thought or mood.    Vital Signs Last 24 Hrs  T(C): 37.2 (26 Mar 2019 08:09), Max: 37.2 (26 Mar 2019 08:09)  T(F): 99 (26 Mar 2019 08:09), Max: 99 (26 Mar 2019 08:09)  HR: 68 (26 Mar 2019 08:09) (65 - 72)  BP: 116/56 (26 Mar 2019 08:09) (111/51 - 127/64)  BP(mean): --  RR: 19 (26 Mar 2019 08:09) (18 - 20)  SpO2: 100% (26 Mar 2019 03:20) (94% - 100%)    PHYSICAL EXAMINATION:    GENERAL: The patient is awake and alert in no apparent distress.     HEENT: Head is normocephalic and atraumatic. Extraocular muscles are intact. Mucous membranes are moist.    NECK: Supple.    LUNGS: Clear to auscultation without wheezing, rales or rhonchi; respirations unlabored    HEART: Regular rate and rhythm without murmur.    ABDOMEN: Soft, nontender, and nondistended.      EXTREMITIES: Without any cyanosis, clubbing, with mild LE edema.    NEUROLOGIC: Grossly intact.    LABS:                        9.6    7.2   )-----------( 181      ( 26 Mar 2019 08:52 )             28.1     03-    138  |  95<L>  |  65.0<H>  ----------------------------<  118<H>  4.6   |  34.0<H>  |  1.01    Ca    8.6      26 Mar 2019 08:52        RADIOLOGY & ADDITIONAL STUDIES:       EXAM:  CT CHEST                          PROCEDURE DATE:  2019          INTERPRETATION:  Chest CT without contrast .  COMPARISON: Chest x-ray 3/19/2019. And chest CT scan 3/8/2019  CLINICAL INFORMATION: Diffuse pulmonary opacities/ARDS. Assess for   underlying malignancy or effusion..  TECHNIQUE: Contiguous axial 2.5 mm slice thickness images of the chest   were obtained without intravenous contrast administration.  FINDINGS:  There is a moderate-large RIGHT pleural effusion with RIGHT lower lobe of   compression basilar atelectasis. Fluid tracks along the major fissure to   the apex of lung. A diffuse of bilateral groundglass opacities are   throughout both lung parenchyma sparing the superior segment of the RIGHT   lower lobe.  There is gross cardiomegaly with coronary artery calcifications. No   pericardial effusion.  .    There are no mediastinal lymphadenopathy or masses.    .    Visualized upper abdominal viscera unremarkable.    Marginal osteophytes are noted at multiple disc spaces in the spine.     IMPRESSION:  No significant change compared to 3/8/2019 except for a diffuse   involvement of lung parenchyma with groundglass opacities.  Moderate-large RIGHT pleural effusion. Compression atelectasis RIGHT lung   base . Diffuse groundglass opacities . Gross cardiomegaly.  Differential diagnosis includes hypersensitivity pneumonitis. Alveolar   proteinosis, ARDS or atypical presentation of a chronic pulmonary edema   of cardiac or noncardiac origin with RIGHT effusion.          TATE PRESTON M.D., ATTENDING RADIOLOGIST  This document has been electronically signed. Mar 22 2019  5:06PM          ECHO:      Summary:   1. Technically suboptimal study.   2. Left ventricular ejection fraction, by visual estimation, is >75%.   3. Hyperdynamic global left ventricular systolic function.   4. There is mild concentric left ventricular hypertrophy.   5. Mild mitral annular calcification.   6. Moderate mitral valve regurgitation.   7. Thickening and calcification of the anterior and posterior mitral   valve leaflets.   8. Moderate-severe tricuspid regurgitation.   9. Estimated pulmonary artery systolic pressure is 55.7 mmHg assuming a   right atrial pressure of 5 mmHg, which is consistent with moderate   pulmonary hypertension.  10. Endocardial visualization was enhanced with intravenous echo contrast.    [id number] Andrew Torres MD, Electronically signed on 2/15/2019 at 6:14:21 PM

## 2019-04-20 LAB
CULTURE RESULTS: SIGNIFICANT CHANGE UP
SPECIMEN SOURCE: SIGNIFICANT CHANGE UP

## 2019-04-27 ENCOUNTER — EMERGENCY (EMERGENCY)
Facility: HOSPITAL | Age: 64
LOS: 1 days | Discharge: HOME | End: 2019-04-27
Attending: EMERGENCY MEDICINE
Payer: MEDICARE

## 2019-04-27 VITALS
DIASTOLIC BLOOD PRESSURE: 67 MMHG | OXYGEN SATURATION: 98 % | HEART RATE: 78 BPM | SYSTOLIC BLOOD PRESSURE: 143 MMHG | RESPIRATION RATE: 16 BRPM | HEIGHT: 64 IN | TEMPERATURE: 98 F | WEIGHT: 169.98 LBS

## 2019-04-27 PROCEDURE — 12011 RPR F/E/E/N/L/M 2.5 CM/<: CPT

## 2019-04-27 PROCEDURE — 90715 TDAP VACCINE 7 YRS/> IM: CPT

## 2019-04-27 PROCEDURE — 90471 IMMUNIZATION ADMIN: CPT

## 2019-04-27 PROCEDURE — 99283 EMERGENCY DEPT VISIT LOW MDM: CPT | Mod: 25

## 2019-04-27 RX ORDER — LIDOCAINE HYDROCHLORIDE AND EPINEPHRINE 10; 10 MG/ML; UG/ML
10 INJECTION, SOLUTION INFILTRATION; PERINEURAL ONCE
Qty: 0 | Refills: 0 | Status: DISCONTINUED | OUTPATIENT
Start: 2019-04-27 | End: 2019-05-02

## 2019-04-27 RX ORDER — TETANUS TOXOID, REDUCED DIPHTHERIA TOXOID AND ACELLULAR PERTUSSIS VACCINE, ADSORBED 5; 2.5; 8; 8; 2.5 [IU]/.5ML; [IU]/.5ML; UG/.5ML; UG/.5ML; UG/.5ML
0.5 SUSPENSION INTRAMUSCULAR ONCE
Qty: 0 | Refills: 0 | Status: COMPLETED | OUTPATIENT
Start: 2019-04-27 | End: 2019-04-27

## 2019-04-27 RX ADMIN — TETANUS TOXOID, REDUCED DIPHTHERIA TOXOID AND ACELLULAR PERTUSSIS VACCINE, ADSORBED 0.5 MILLILITER(S): 5; 2.5; 8; 8; 2.5 SUSPENSION INTRAMUSCULAR at 12:29

## 2019-04-27 NOTE — ED STATDOCS - OBJECTIVE STATEMENT
64 y/o F pt with significant PMHx of DM, HTN, HLD, and CAD (2 stents placed) presents to the ED c/o sudden fall that occurred today. Pt states she was in the bathroom and when she went to flush the toiltet she tripped and fell and hit her chin. Pt was not able to get up herself. Denies hitting head, LOC, fever, chills, nausea, vomiting, diarrhea, CP, SOB, or HA. Pt is on Blood thinners. She is unaware of her last Tetanus shot. No further complaints at this time.

## 2019-04-27 NOTE — ED STATDOCS - SKIN, MLM
skin normal color for race, warm, dry and intact except for lac to chin will need 2 stitches skin normal color for race, warm, dry and intact except for lac to chin  1 cm midl gappign and midl bleeding,will need 2 stitches

## 2019-04-27 NOTE — ED ADULT TRIAGE NOTE - CHIEF COMPLAINT QUOTE
pt reports trip and fall in shower. - loc. on plavix. lac to chin. a and o x3. breathing even and unlabored.

## 2019-04-27 NOTE — ED STATDOCS - CLINICAL SUMMARY MEDICAL DECISION MAKING FREE TEXT BOX
Lac repair and tetanus. Lac repair and tetanus update and discharge with wound care instructions,  agreed to watch her for next 24 hrs an dgiven strict concussion instructions

## 2019-04-27 NOTE — ED STATDOCS - EYES, MLM
clear bilaterally.  Pupils equal, round, and reactive to light. left pupil  normal, rt eye is opque at baseline

## 2019-04-27 NOTE — ED STATDOCS - PROGRESS NOTE DETAILS
Laceration repaired. D/w Dr. Burkett- no further testing necessary at this time. Pt stable for d/c. I performed the initial face to face bedside interview with this patient regarding history of present illness, review of symptoms and past medical, social and family history.  I completed an independent physical examination.  I was the initial provider who evaluated this patient.  The history, review of symptoms and examination was documented by the scribe in my presence and I attest to the accuracy of the documentation.  I have signed out the follow up of any pending tests (i.e. labs, radiological studies) to the PA.  I have discussed the patient’s plan of care and disposition with the PA.

## 2019-05-17 ENCOUNTER — INPATIENT (INPATIENT)
Facility: HOSPITAL | Age: 64
LOS: 5 days | Discharge: HOME | DRG: 264 | End: 2019-05-23
Attending: INTERNAL MEDICINE | Admitting: HOSPITALIST
Payer: MEDICARE

## 2019-05-17 ENCOUNTER — APPOINTMENT (OUTPATIENT)
Dept: CARDIOLOGY | Facility: CLINIC | Age: 64
End: 2019-05-17

## 2019-05-17 VITALS
SYSTOLIC BLOOD PRESSURE: 145 MMHG | HEART RATE: 68 BPM | RESPIRATION RATE: 14 BRPM | TEMPERATURE: 98 F | OXYGEN SATURATION: 98 % | DIASTOLIC BLOOD PRESSURE: 75 MMHG

## 2019-05-17 DIAGNOSIS — J81.0 ACUTE PULMONARY EDEMA: ICD-10-CM

## 2019-05-17 DIAGNOSIS — I25.10 ATHEROSCLEROTIC HEART DISEASE OF NATIVE CORONARY ARTERY WITHOUT ANGINA PECTORIS: ICD-10-CM

## 2019-05-17 DIAGNOSIS — I27.20 PULMONARY HYPERTENSION, UNSPECIFIED: ICD-10-CM

## 2019-05-17 DIAGNOSIS — I10 ESSENTIAL (PRIMARY) HYPERTENSION: ICD-10-CM

## 2019-05-17 DIAGNOSIS — J90 PLEURAL EFFUSION, NOT ELSEWHERE CLASSIFIED: ICD-10-CM

## 2019-05-17 LAB
ALBUMIN SERPL ELPH-MCNC: 3.3 G/DL — SIGNIFICANT CHANGE UP (ref 3.3–5.2)
ALP SERPL-CCNC: 248 U/L — HIGH (ref 40–120)
ALT FLD-CCNC: 132 U/L — HIGH
ANION GAP SERPL CALC-SCNC: 10 MMOL/L — SIGNIFICANT CHANGE UP (ref 5–17)
ANION GAP SERPL CALC-SCNC: 12 MMOL/L — SIGNIFICANT CHANGE UP (ref 5–17)
ANISOCYTOSIS BLD QL: SLIGHT — SIGNIFICANT CHANGE UP
APPEARANCE UR: CLEAR — SIGNIFICANT CHANGE UP
APTT BLD: 24.8 SEC — LOW (ref 27.5–36.3)
AST SERPL-CCNC: 98 U/L — HIGH
BACTERIA # UR AUTO: ABNORMAL
BASOPHILS # BLD AUTO: 0 K/UL — SIGNIFICANT CHANGE UP (ref 0–0.2)
BILIRUB SERPL-MCNC: 1.2 MG/DL — SIGNIFICANT CHANGE UP (ref 0.4–2)
BILIRUB UR-MCNC: NEGATIVE — SIGNIFICANT CHANGE UP
BUN SERPL-MCNC: 53 MG/DL — HIGH (ref 8–20)
BUN SERPL-MCNC: 55 MG/DL — HIGH (ref 8–20)
CALCIUM SERPL-MCNC: 8.5 MG/DL — LOW (ref 8.6–10.2)
CALCIUM SERPL-MCNC: 8.6 MG/DL — SIGNIFICANT CHANGE UP (ref 8.6–10.2)
CHLORIDE SERPL-SCNC: 97 MMOL/L — LOW (ref 98–107)
CHLORIDE SERPL-SCNC: 99 MMOL/L — SIGNIFICANT CHANGE UP (ref 98–107)
CO2 SERPL-SCNC: 22 MMOL/L — SIGNIFICANT CHANGE UP (ref 22–29)
CO2 SERPL-SCNC: 24 MMOL/L — SIGNIFICANT CHANGE UP (ref 22–29)
COLOR SPEC: YELLOW — SIGNIFICANT CHANGE UP
COMMENT - URINE: SIGNIFICANT CHANGE UP
CREAT SERPL-MCNC: 1.64 MG/DL — HIGH (ref 0.5–1.3)
CREAT SERPL-MCNC: 1.8 MG/DL — HIGH (ref 0.5–1.3)
DIFF PNL FLD: ABNORMAL
ELLIPTOCYTES BLD QL SMEAR: SLIGHT — SIGNIFICANT CHANGE UP
EOSINOPHIL # BLD AUTO: 0 K/UL — SIGNIFICANT CHANGE UP (ref 0–0.5)
EPI CELLS # UR: SIGNIFICANT CHANGE UP
GLUCOSE SERPL-MCNC: 142 MG/DL — HIGH (ref 70–115)
GLUCOSE SERPL-MCNC: 225 MG/DL — HIGH (ref 70–115)
GLUCOSE UR QL: NEGATIVE MG/DL — SIGNIFICANT CHANGE UP
HCT VFR BLD CALC: 27.1 % — LOW (ref 37–47)
HGB BLD-MCNC: 9.2 G/DL — LOW (ref 12–16)
HYPOCHROMIA BLD QL: SLIGHT — SIGNIFICANT CHANGE UP
INR BLD: 1.38 RATIO — HIGH (ref 0.88–1.16)
KETONES UR-MCNC: ABNORMAL
LEUKOCYTE ESTERASE UR-ACNC: ABNORMAL
LYMPHOCYTES # BLD AUTO: 1.3 K/UL — SIGNIFICANT CHANGE UP (ref 1–4.8)
LYMPHOCYTES # BLD AUTO: 11 % — LOW (ref 20–55)
MACROCYTES BLD QL: SLIGHT — SIGNIFICANT CHANGE UP
MCHC RBC-ENTMCNC: 29.2 PG — SIGNIFICANT CHANGE UP (ref 27–31)
MCHC RBC-ENTMCNC: 33.9 G/DL — SIGNIFICANT CHANGE UP (ref 32–36)
MCV RBC AUTO: 86 FL — SIGNIFICANT CHANGE UP (ref 81–99)
MICROCYTES BLD QL: SLIGHT — SIGNIFICANT CHANGE UP
MONOCYTES # BLD AUTO: 0.7 K/UL — SIGNIFICANT CHANGE UP (ref 0–0.8)
MONOCYTES NFR BLD AUTO: 7 % — SIGNIFICANT CHANGE UP (ref 3–10)
NEUTROPHILS # BLD AUTO: 8 K/UL — SIGNIFICANT CHANGE UP (ref 1.8–8)
NEUTROPHILS NFR BLD AUTO: 82 % — HIGH (ref 37–73)
NITRITE UR-MCNC: NEGATIVE — SIGNIFICANT CHANGE UP
NT-PROBNP SERPL-SCNC: 7026 PG/ML — HIGH (ref 0–300)
OVALOCYTES BLD QL SMEAR: SLIGHT — SIGNIFICANT CHANGE UP
PH UR: 5 — SIGNIFICANT CHANGE UP (ref 5–8)
PLAT MORPH BLD: NORMAL — SIGNIFICANT CHANGE UP
PLATELET # BLD AUTO: 194 K/UL — SIGNIFICANT CHANGE UP (ref 150–400)
POIKILOCYTOSIS BLD QL AUTO: SLIGHT — SIGNIFICANT CHANGE UP
POLYCHROMASIA BLD QL SMEAR: SLIGHT — SIGNIFICANT CHANGE UP
POTASSIUM SERPL-MCNC: 6.4 MMOL/L — CRITICAL HIGH (ref 3.5–5.3)
POTASSIUM SERPL-MCNC: 6.6 MMOL/L — CRITICAL HIGH (ref 3.5–5.3)
POTASSIUM SERPL-MCNC: 6.9 MMOL/L — CRITICAL HIGH (ref 3.5–5.3)
POTASSIUM SERPL-SCNC: 6.4 MMOL/L — CRITICAL HIGH (ref 3.5–5.3)
POTASSIUM SERPL-SCNC: 6.6 MMOL/L — CRITICAL HIGH (ref 3.5–5.3)
POTASSIUM SERPL-SCNC: 6.9 MMOL/L — CRITICAL HIGH (ref 3.5–5.3)
PROT SERPL-MCNC: 6.8 G/DL — SIGNIFICANT CHANGE UP (ref 6.6–8.7)
PROT UR-MCNC: 100 MG/DL
PROTHROM AB SERPL-ACNC: 16 SEC — HIGH (ref 10–12.9)
RBC # BLD: 3.15 M/UL — LOW (ref 4.4–5.2)
RBC # FLD: 17.5 % — HIGH (ref 11–15.6)
RBC BLD AUTO: ABNORMAL
RBC CASTS # UR COMP ASSIST: ABNORMAL /HPF (ref 0–4)
SCHISTOCYTES BLD QL AUTO: SLIGHT — SIGNIFICANT CHANGE UP
SODIUM SERPL-SCNC: 131 MMOL/L — LOW (ref 135–145)
SODIUM SERPL-SCNC: 133 MMOL/L — LOW (ref 135–145)
SP GR SPEC: 1.02 — SIGNIFICANT CHANGE UP (ref 1.01–1.02)
TROPONIN T SERPL-MCNC: 0.1 NG/ML — HIGH (ref 0–0.06)
TROPONIN T SERPL-MCNC: 0.12 NG/ML — HIGH (ref 0–0.06)
TSH SERPL-MCNC: 1.1 UIU/ML — SIGNIFICANT CHANGE UP (ref 0.27–4.2)
UROBILINOGEN FLD QL: 4 MG/DL
WBC # BLD: 9.6 K/UL — SIGNIFICANT CHANGE UP (ref 4.8–10.8)
WBC # FLD AUTO: 9.6 K/UL — SIGNIFICANT CHANGE UP (ref 4.8–10.8)
WBC UR QL: ABNORMAL

## 2019-05-17 PROCEDURE — 70450 CT HEAD/BRAIN W/O DYE: CPT | Mod: 26

## 2019-05-17 PROCEDURE — 99291 CRITICAL CARE FIRST HOUR: CPT

## 2019-05-17 PROCEDURE — 93010 ELECTROCARDIOGRAM REPORT: CPT

## 2019-05-17 PROCEDURE — 99223 1ST HOSP IP/OBS HIGH 75: CPT

## 2019-05-17 PROCEDURE — 71045 X-RAY EXAM CHEST 1 VIEW: CPT | Mod: 26

## 2019-05-17 RX ORDER — SODIUM CHLORIDE 9 MG/ML
1000 INJECTION, SOLUTION INTRAVENOUS
Refills: 0 | Status: DISCONTINUED | OUTPATIENT
Start: 2019-05-17 | End: 2019-05-23

## 2019-05-17 RX ORDER — GLUCAGON INJECTION, SOLUTION 0.5 MG/.1ML
1 INJECTION, SOLUTION SUBCUTANEOUS ONCE
Refills: 0 | Status: DISCONTINUED | OUTPATIENT
Start: 2019-05-17 | End: 2019-05-23

## 2019-05-17 RX ORDER — ATORVASTATIN CALCIUM 80 MG/1
80 TABLET, FILM COATED ORAL AT BEDTIME
Refills: 0 | Status: DISCONTINUED | OUTPATIENT
Start: 2019-05-17 | End: 2019-05-23

## 2019-05-17 RX ORDER — INSULIN HUMAN 100 [IU]/ML
10 INJECTION, SOLUTION SUBCUTANEOUS ONCE
Refills: 0 | Status: COMPLETED | OUTPATIENT
Start: 2019-05-17 | End: 2019-05-17

## 2019-05-17 RX ORDER — SODIUM CHLORIDE 9 MG/ML
1000 INJECTION INTRAMUSCULAR; INTRAVENOUS; SUBCUTANEOUS ONCE
Refills: 0 | Status: COMPLETED | OUTPATIENT
Start: 2019-05-17 | End: 2019-05-17

## 2019-05-17 RX ORDER — DEXTROSE 50 % IN WATER 50 %
25 SYRINGE (ML) INTRAVENOUS ONCE
Refills: 0 | Status: DISCONTINUED | OUTPATIENT
Start: 2019-05-17 | End: 2019-05-23

## 2019-05-17 RX ORDER — SODIUM POLYSTYRENE SULFONATE 4.1 MEQ/G
15 POWDER, FOR SUSPENSION ORAL ONCE
Refills: 0 | Status: COMPLETED | OUTPATIENT
Start: 2019-05-17 | End: 2019-05-17

## 2019-05-17 RX ORDER — ALBUTEROL 90 UG/1
2.5 AEROSOL, METERED ORAL ONCE
Refills: 0 | Status: COMPLETED | OUTPATIENT
Start: 2019-05-17 | End: 2019-05-17

## 2019-05-17 RX ORDER — TAMSULOSIN HYDROCHLORIDE 0.4 MG/1
0.4 CAPSULE ORAL AT BEDTIME
Refills: 0 | Status: DISCONTINUED | OUTPATIENT
Start: 2019-05-17 | End: 2019-05-23

## 2019-05-17 RX ORDER — DEXTROSE 50 % IN WATER 50 %
50 SYRINGE (ML) INTRAVENOUS ONCE
Refills: 0 | Status: COMPLETED | OUTPATIENT
Start: 2019-05-17 | End: 2019-05-17

## 2019-05-17 RX ORDER — DEXTROSE 50 % IN WATER 50 %
12.5 SYRINGE (ML) INTRAVENOUS ONCE
Refills: 0 | Status: DISCONTINUED | OUTPATIENT
Start: 2019-05-17 | End: 2019-05-23

## 2019-05-17 RX ORDER — FUROSEMIDE 40 MG
40 TABLET ORAL ONCE
Refills: 0 | Status: COMPLETED | OUTPATIENT
Start: 2019-05-17 | End: 2019-05-17

## 2019-05-17 RX ORDER — GABAPENTIN 400 MG/1
100 CAPSULE ORAL THREE TIMES A DAY
Refills: 0 | Status: DISCONTINUED | OUTPATIENT
Start: 2019-05-17 | End: 2019-05-23

## 2019-05-17 RX ORDER — ISOSORBIDE MONONITRATE 60 MG/1
60 TABLET, EXTENDED RELEASE ORAL DAILY
Refills: 0 | Status: DISCONTINUED | OUTPATIENT
Start: 2019-05-17 | End: 2019-05-23

## 2019-05-17 RX ORDER — ASPIRIN/CALCIUM CARB/MAGNESIUM 324 MG
81 TABLET ORAL DAILY
Refills: 0 | Status: DISCONTINUED | OUTPATIENT
Start: 2019-05-17 | End: 2019-05-23

## 2019-05-17 RX ORDER — CARVEDILOL PHOSPHATE 80 MG/1
6.25 CAPSULE, EXTENDED RELEASE ORAL EVERY 12 HOURS
Refills: 0 | Status: DISCONTINUED | OUTPATIENT
Start: 2019-05-17 | End: 2019-05-23

## 2019-05-17 RX ORDER — FUROSEMIDE 40 MG
40 TABLET ORAL
Refills: 0 | Status: DISCONTINUED | OUTPATIENT
Start: 2019-05-17 | End: 2019-05-22

## 2019-05-17 RX ORDER — ACETAMINOPHEN 500 MG
975 TABLET ORAL ONCE
Refills: 0 | Status: COMPLETED | OUTPATIENT
Start: 2019-05-17 | End: 2019-05-17

## 2019-05-17 RX ORDER — INSULIN LISPRO 100/ML
VIAL (ML) SUBCUTANEOUS
Refills: 0 | Status: DISCONTINUED | OUTPATIENT
Start: 2019-05-17 | End: 2019-05-23

## 2019-05-17 RX ORDER — LANOLIN ALCOHOL/MO/W.PET/CERES
3 CREAM (GRAM) TOPICAL AT BEDTIME
Refills: 0 | Status: DISCONTINUED | OUTPATIENT
Start: 2019-05-17 | End: 2019-05-23

## 2019-05-17 RX ORDER — DEXTROSE 50 % IN WATER 50 %
15 SYRINGE (ML) INTRAVENOUS ONCE
Refills: 0 | Status: DISCONTINUED | OUTPATIENT
Start: 2019-05-17 | End: 2019-05-23

## 2019-05-17 RX ORDER — PANTOPRAZOLE SODIUM 20 MG/1
40 TABLET, DELAYED RELEASE ORAL
Refills: 0 | Status: DISCONTINUED | OUTPATIENT
Start: 2019-05-17 | End: 2019-05-23

## 2019-05-17 RX ORDER — DULOXETINE HYDROCHLORIDE 30 MG/1
60 CAPSULE, DELAYED RELEASE ORAL DAILY
Refills: 0 | Status: DISCONTINUED | OUTPATIENT
Start: 2019-05-17 | End: 2019-05-23

## 2019-05-17 RX ADMIN — ALBUTEROL 2.5 MILLIGRAM(S): 90 AEROSOL, METERED ORAL at 14:44

## 2019-05-17 RX ADMIN — INSULIN HUMAN 10 UNIT(S): 100 INJECTION, SOLUTION SUBCUTANEOUS at 14:45

## 2019-05-17 RX ADMIN — Medication 50 MILLILITER(S): at 14:45

## 2019-05-17 RX ADMIN — SODIUM POLYSTYRENE SULFONATE 15 GRAM(S): 4.1 POWDER, FOR SUSPENSION ORAL at 17:51

## 2019-05-17 RX ADMIN — Medication 3 MILLIGRAM(S): at 21:39

## 2019-05-17 RX ADMIN — TAMSULOSIN HYDROCHLORIDE 0.4 MILLIGRAM(S): 0.4 CAPSULE ORAL at 21:39

## 2019-05-17 RX ADMIN — Medication 975 MILLIGRAM(S): at 16:04

## 2019-05-17 RX ADMIN — GABAPENTIN 100 MILLIGRAM(S): 400 CAPSULE ORAL at 21:39

## 2019-05-17 RX ADMIN — ATORVASTATIN CALCIUM 80 MILLIGRAM(S): 80 TABLET, FILM COATED ORAL at 21:39

## 2019-05-17 RX ADMIN — SODIUM CHLORIDE 1000 MILLILITER(S): 9 INJECTION INTRAMUSCULAR; INTRAVENOUS; SUBCUTANEOUS at 11:35

## 2019-05-17 RX ADMIN — SODIUM CHLORIDE 1000 MILLILITER(S): 9 INJECTION INTRAMUSCULAR; INTRAVENOUS; SUBCUTANEOUS at 15:59

## 2019-05-17 RX ADMIN — CARVEDILOL PHOSPHATE 6.25 MILLIGRAM(S): 80 CAPSULE, EXTENDED RELEASE ORAL at 17:51

## 2019-05-17 RX ADMIN — Medication 975 MILLIGRAM(S): at 14:44

## 2019-05-17 RX ADMIN — Medication 40 MILLIGRAM(S): at 12:49

## 2019-05-17 NOTE — CONSULT NOTE ADULT - PROBLEM SELECTOR RECOMMENDATION 9
- Tele monitoring  - EKG ordered  - HFpEF  - c/w furosemide 40mg IVP BID  - Strict I/O and daily standing weights (if possible)  - Keep K > 4, Mg > 2    - Monitor renal function with ongoing diuresis  - c/w coreg and Imdur - Tele monitoring  - EKG ordered  - 2/2 HFpEF  - c/w furosemide 40mg IVP BID  - Strict I/O and daily standing weights (if possible)  - Keep K > 4, Mg > 2    - Monitor renal function with ongoing diuresis  - c/w coreg and Imdur

## 2019-05-17 NOTE — H&P ADULT - NSHPPHYSICALEXAM_GEN_ALL_CORE
Vital Signs Last 24 Hrs  T(C): 36.7 (17 May 2019 09:50), Max: 36.7 (17 May 2019 09:50)  T(F): 98 (17 May 2019 09:50), Max: 98 (17 May 2019 09:50)  HR: 65 (17 May 2019 11:35) (65 - 68)  BP: 141/71 (17 May 2019 11:35) (141/71 - 145/75)  BP(mean): --  RR: 15 (17 May 2019 11:35) (14 - 15)  SpO2: 97% (17 May 2019 11:35) (97% - 98%)    General appearance: No acute distress, Awake, Alert  HEENT: Normocephalic, Atraumatic, Conjunctiva clear, EOMI, Right corneal opacity  Neck: Supple, No JVD, No tenderness  Lungs: Breath sound equal bilaterally, No wheezes, Mild rales  Cardiovascular: S1S2, Regular rhythm  Abdomen: Soft, Nontender, Nondistended, No guarding/rebound, Positive bowel sounds  Extremities: No clubbing, No cyanosis, No calf tenderness, Lower extremity edema  Neuro: Strength equal bilaterally, No tremors  Psychiatric: Appropriate mood, Normal affect

## 2019-05-17 NOTE — ED ADULT NURSE NOTE - PMH
CAD (coronary artery disease)    Depression    Diabetes    GERD (gastroesophageal reflux disease)    Glaucoma    Hypertension, unspecified type    Insomnia    Peripheral neuropathy    Retinal detachment    Urinary retention

## 2019-05-17 NOTE — CONSULT NOTE ADULT - ASSESSMENT
A/P:  63 yo F presented with hx of CAD, s/p CABG x1 (lima->LAD 5/2015), MADHAVI x2-> LCX & OM1 (8/2015), nuclear stress 11/2018 with mildly positive ischemia in RCA (medically managed), cardiac arrest due to hypercapnea, HFpEF (11/2018 EF 65-70%), moderate RV dysfunction and severe pulmonary HTN, PAD s/p balloon angioplasty, DM2, HTN, and HLD who presents with worsening dyspnea on exertion. She recently was discharged from a 2-month hospitalization as she was being treated for ADHF, KEITH, , GIB, and right pleural effusion s/p thoracentesis. The patient reports that her shortness of breath started a few days ago and was associated with significant fatigue. As per the emergency department notes, the patient was noted to be "slow to respond" two days prior. The patient denied any associated chest pain or palpitations but admits to increased lower extremity swelling. She denied any orthopnea or increased urination. The patient denied any recent changes in her diet or medications and reports being compliant with her medications. She is extremely lethargic and arousable to tactile stimuli, falls asleep during conversation.  No appearance of increased wob, maintaining spO2 >94% via NC. 65 yo F presented with hx of CAD, s/p CABG x1 (lima->LAD 5/2015), MADHAVI x2-> LCX & OM1 (8/2015), nuclear stress 11/2018 with mildly positive ischemia in RCA (medically managed), cardiac arrest due to hypercapnea, HFpEF (11/2018 EF 65-70%), moderate RV dysfunction and severe pulmonary HTN, PAD s/p balloon angioplasty, DM2, HTN, and HLD who presents with worsening dyspnea on exertion. She recently was discharged from a 2-month hospitalization as she was being treated for ADHF, KEITH, , GIB, and right pleural effusion s/p thoracentesis. The patient reports that her shortness of breath started a few days ago and was associated with significant fatigue. As per the emergency department notes, the patient was noted to be "slow to respond" two days prior. The patient denied any associated chest pain or palpitations but admits to increased lower extremity swelling. She denied any orthopnea or increased urination. The patient denied any recent changes in her diet or medications and reports being compliant with her medications. She is extremely lethargic and arousable to tactile stimuli, falls asleep during conversation.  No appearance of increased wob, maintaining spO2 >94% via NC.

## 2019-05-17 NOTE — H&P ADULT - HISTORY OF PRESENT ILLNESS
64F presented with increasing dyspnea. The patient reported that the shortness of breath started yesterday and was associated with significant fatigue. As per the emergency department notes, the patient was noted to be "slow to respond" two days prior. The patient denied any associated chest pain or palpitations but admits to increased lower extremity swelling. She denied any orthopnea or increased urination. The patient denied any recent changes in her diet or medications and reports being compliant with her medications. She reports similar symptoms in the past for which she was admitted to the hospital a few months prior. She is unaware of any aggravating or relieving factors. She denied any associated fevers or chills. There was no audible wheezing.

## 2019-05-17 NOTE — ED PROVIDER NOTE - OBJECTIVE STATEMENT
Pt is a 64 year old female with history of HTN presenting with worsening generalized weakness for the past 2 days. No fevers, chills, nausea, vomiting. No AMS. No cough or sob. + left sided chest pain. No focal weakness or numbness. No changes in meds. No trauma or recent illness. No urinary complaints, diarrhea, constipation. Pt unable to elaborate further except that she feels too weak to do anything. Pt is a 64 year old female with history of HTN, CHF, DM presenting with worsening generalized weakness for the past 2 days. No fevers, chills, nausea, vomiting. No AMS. No cough or sob. + left sided chest pain. No focal weakness or numbness. No changes in meds. No trauma or recent illness. No urinary complaints, diarrhea, constipation. Pt unable to elaborate further except that she feels too weak to do anything.

## 2019-05-17 NOTE — ED PROVIDER NOTE - CARE PLAN
Principal Discharge DX:	Acute pulmonary edema  Secondary Diagnosis:	Pleural effusion  Secondary Diagnosis:	KEITH (acute kidney injury)  Secondary Diagnosis:	Hyperkalemia

## 2019-05-17 NOTE — ED PROVIDER NOTE - PMH
Hypertension, unspecified type CAD (coronary artery disease)    Congestive heart failure, unspecified HF chronicity, unspecified heart failure type    Depression    Diabetes    GERD (gastroesophageal reflux disease)    Glaucoma    Hypertension, unspecified type    Insomnia    Peripheral neuropathy    Retinal detachment    Urinary retention

## 2019-05-17 NOTE — H&P ADULT - NSHPSOCIALHISTORY_GEN_ALL_CORE
Denied tobacco, alcohol, or illicit drug use  Lives at home with her family    Family History - Denied coronary artery disease in her parents

## 2019-05-17 NOTE — ED ADULT TRIAGE NOTE - CHIEF COMPLAINT QUOTE
per EMS pt was "slow to respond yesterday" but refused to come to the hospital. family called EMS this am for same complaints. pt presents to ED lethargic but responds to verbal stimuli. ems reports pt non-compliant with her insulin  NAD

## 2019-05-17 NOTE — ED ADULT NURSE REASSESSMENT NOTE - NS ED NURSE REASSESS COMMENT FT1
Report received from offgoing nurse AM. Pt received AOx4, resting in bed comfortably at this time, no apparent distress noted. pt safety maintained. Pt denies any complaints at this time. pt educated on plan of care, plan of care taught back to RN. plan of care education deemed proficient through successful teach back. will continue to reeducate pt regarding plan of care.

## 2019-05-17 NOTE — CONSULT NOTE ADULT - SUBJECTIVE AND OBJECTIVE BOX
Ms. Coley is a very pleasant but sickly 63 yo F presented with hx of CAD, s/p CABG x1 (lima->LAD 2015), MADHAVI x2-> LCX & OM1 (2015), nuclear stress 2018 with mildly positive ischemia in RCA (medically managed), cardiac arrest due to hypercapnea, HFpEF (2018 EF 65-70%), moderate RV dysfunction and severe pulmonary HTN, PAD s/p balloon angioplasty, DM2, HTN, and HLD who presents with worsening dyspnea on exertion. She recently was discharged from a 2-month hospitalization for severe The patient reported that the shortness of breath started yesterday and was associated with significant fatigue. As per the emergency department notes, the patient was noted to be "slow to respond" two days prior. The patient denied any associated chest pain or palpitations but admits to increased lower extremity swelling. She denied any orthopnea or increased urination. The patient denied any recent changes in her diet or medications and reports being compliant with her medications. She reports similar symptoms in the past for which she was admitted to the hospital a few months prior. She is unaware of any aggravating or relieving factors. She denied any associated fevers or chills.    Cardiology consultation was requested        PAST MEDICAL & SURGICAL HISTORY:  Congestive heart failure, unspecified HF chronicity, unspecified heart failure type  Insomnia  Retinal detachment  CAD (coronary artery disease)  Glaucoma  Urinary retention  Peripheral neuropathy  GERD (gastroesophageal reflux disease)  Depression  Diabetes  Hypertension, unspecified type  No significant past surgical history        FAMILY HISTORY:    negative for any inherited arrhythmias, cardiomyopathies, or SCD    SOCIAL HISTORY:  denies tobacco/EtOH/IVDU      REVIEW OF SYSTEMS:  Constitutional- No fever, weight loss, or fatigue  HEENT- No eye pain, visual disturbances, or discharge; no difficulty hearing, tinnitus, vertigo; No sinus or throat pain; no pain or stiffness  Breasts- No pain, masses, or nipple discharge  Pulmonary- No cough, wheezing, chills or hemoptysis; No shortness of breath  Cardiovascular- as per HPI  Gastrointestinal- No abdominal or epigastric pain. No nausea, vomiting, or hematemesis; No diarrhea or constipation. No melena or hematochezia.  Genitourinary- No dysuria, frequency, hematuria, or incontinence  Neurological- No headaches, memory loss, loss of strength, numbness, or tremors  Dermatological- No itching, burning, rashes, or lesions   Lymphatics- No enlarged glands  Endocrine- No heat or cold intolerance; No hair loss  Musculoskeletal- No joint pain or swelling; No muscle, back, or extremity pain  Psychiatric- No depression, anxiety, mood swings, or difficulty sleeping  Hematologic- No easy bruising, or bleeding gums  Allergy & Immunology- No hives or eczema      Allergies    Allergy Status Unknown    Intolerances          MEDICATIONS  (STANDING):  aspirin enteric coated 81 milliGRAM(s) Oral daily  atorvastatin 80 milliGRAM(s) Oral at bedtime  carvedilol 6.25 milliGRAM(s) Oral every 12 hours  dextrose 5%. 1000 milliLiter(s) (50 mL/Hr) IV Continuous <Continuous>  dextrose 50% Injectable 12.5 Gram(s) IV Push once  dextrose 50% Injectable 25 Gram(s) IV Push once  dextrose 50% Injectable 25 Gram(s) IV Push once  DULoxetine 60 milliGRAM(s) Oral daily  furosemide   Injectable 40 milliGRAM(s) IV Push two times a day  gabapentin 100 milliGRAM(s) Oral three times a day  insulin lispro (HumaLOG) corrective regimen sliding scale   SubCutaneous three times a day before meals  isosorbide   mononitrate ER Tablet (IMDUR) 60 milliGRAM(s) Oral daily  melatonin 3 milliGRAM(s) Oral at bedtime  pantoprazole    Tablet 40 milliGRAM(s) Oral before breakfast  sodium polystyrene sulfonate Suspension 15 Gram(s) Oral once  tamsulosin 0.4 milliGRAM(s) Oral at bedtime    MEDICATIONS  (PRN):  dextrose 40% Gel 15 Gram(s) Oral once PRN Blood Glucose LESS THAN 70 milliGRAM(s)/deciliter  glucagon  Injectable 1 milliGRAM(s) IntraMuscular once PRN Glucose LESS THAN 70 milligrams/deciliter        Vital Signs Last 24 Hrs  T(C): 36.8 (17 May 2019 16:25), Max: 36.8 (17 May 2019 16:25)  T(F): 98.2 (17 May 2019 16:25), Max: 98.2 (17 May 2019 16:25)  HR: 63 (17 May 2019 16:25) (63 - 68)  BP: 142/67 (17 May 2019 16:25) (141/71 - 145/75)  BP(mean): --  RR: 15 (17 May 2019 16:25) (14 - 15)  SpO2: 98% (17 May 2019 16:25) (97% - 98%)      PHYSICAL EXAM:  Constitutional- AAOx3, NAD  Neck: supple, No JVD  Cardiovascular: +S1S2 RRR, no murmurs, rubs, gallops   Pulmonary: CTA b/l, unlabored, no wheezes, rales, or rhonchi  Abdomen: +BS, soft NTND  Extremities: no edema b/l, +distal pulses b/l  Neuro: non focal, speech clear, GAN x 4  Psych: appropriate mood & affect      LABS:                        9.2    9.6   )-----------( 194      ( 17 May 2019 11:38 )             27.1         x   |  x   |  x   ----------------------------<  x   6.4<HH>   |  x   |  x     Ca    8.6      17 May 2019 12:21    TPro  6.8  /  Alb  3.3  /  TBili  1.2  /  DBili  x   /  AST  98<H>  /  ALT  132<H>  /  AlkPhos  248<H>      LIVER FUNCTIONS - ( 17 May 2019 12:21 )  Alb: 3.3 g/dL / Pro: 6.8 g/dL / ALK PHOS: 248 U/L / ALT: 132 U/L / AST: 98 U/L / GGT: x           PT/INR - ( 17 May 2019 11:38 )   PT: 16.0 sec;   INR: 1.38 ratio         PTT - ( 17 May 2019 11:38 )  PTT:24.8 sec  CARDIAC MARKERS ( 17 May 2019 12:21 )  x     / 0.10 ng/mL / x     / x     / x          Urinalysis Basic - ( 17 May 2019 12:37 )    Color: Yellow / Appearance: Clear / S.020 / pH: x  Gluc: x / Ketone: Trace  / Bili: Negative / Urobili: 4 mg/dL   Blood: x / Protein: 100 mg/dL / Nitrite: Negative   Leuk Esterase: Moderate / RBC: 6-10 /HPF / WBC 6-10   Sq Epi: x / Non Sq Epi: Occasional / Bacteria: Occasional          CARDIAC IMAGING/TESTING:      EP DATA: Ms. Coley is a very pleasant but sickly 63 yo F presented with hx of CAD, s/p CABG x1 (lima->LAD 2015), MADHAVI x2-> LCX & OM1 (2015), nuclear stress 2018 with mildly positive ischemia in RCA (medically managed), cardiac arrest due to hypercapnea, HFpEF (2018 EF 65-70%), moderate RV dysfunction and severe pulmonary HTN, PAD s/p balloon angioplasty, DM2, HTN, and HLD who presents with worsening dyspnea on exertion. She recently was discharged from a 2-month hospitalization as she was being treated for ADHF, KEITH, , GIB, and right pleural effusion s/p thoracentesis.    The patient reports that her shortness of breath started a few days ago and was associated with significant fatigue. As per the emergency department notes, the patient was noted to be "slow to respond" two days prior. The patient denied any associated chest pain or palpitations but admits to increased lower extremity swelling. She denied any orthopnea or increased urination. The patient denied any recent changes in her diet or medications and reports being compliant with her medications. She reports similar symptoms in the past for which she was admitted to the hospital a few months prior. She is unaware of any aggravating or relieving factors. She denied any associated fevers or chills.    Cardiology consultation was requested as she is well known to the service and follows with Dr. Recio as an outpatient.       PAST MEDICAL & SURGICAL HISTORY:  Congestive heart failure, unspecified HF chronicity, unspecified heart failure type  Insomnia  Retinal detachment  CAD (coronary artery disease)  Glaucoma  Urinary retention  Peripheral neuropathy  GERD (gastroesophageal reflux disease)  Depression  Diabetes  Hypertension, unspecified type  No significant past surgical history        FAMILY HISTORY:    negative for any inherited arrhythmias, cardiomyopathies, or SCD    SOCIAL HISTORY:  denies tobacco/EtOH/IVDU      REVIEW OF SYSTEMS:  Constitutional- No fever, weight loss, or fatigue  HEENT- No eye pain, visual disturbances, or discharge; no difficulty hearing, tinnitus, vertigo; No sinus or throat pain; no pain or stiffness  Breasts- No pain, masses, or nipple discharge  Pulmonary- No cough, wheezing, chills or hemoptysis; No shortness of breath  Cardiovascular- as per HPI  Gastrointestinal- No abdominal or epigastric pain. No nausea, vomiting, or hematemesis; No diarrhea or constipation. No melena or hematochezia.  Genitourinary- No dysuria, frequency, hematuria, or incontinence  Neurological- No headaches, memory loss, loss of strength, numbness, or tremors  Dermatological- No itching, burning, rashes, or lesions   Lymphatics- No enlarged glands  Endocrine- No heat or cold intolerance; No hair loss  Musculoskeletal- No joint pain or swelling; No muscle, back, or extremity pain  Psychiatric- No depression, anxiety, mood swings, or difficulty sleeping  Hematologic- No easy bruising, or bleeding gums  Allergy & Immunology- No hives or eczema      Allergies    Allergy Status Unknown    Intolerances          MEDICATIONS  (STANDING):  aspirin enteric coated 81 milliGRAM(s) Oral daily  atorvastatin 80 milliGRAM(s) Oral at bedtime  carvedilol 6.25 milliGRAM(s) Oral every 12 hours  dextrose 5%. 1000 milliLiter(s) (50 mL/Hr) IV Continuous <Continuous>  dextrose 50% Injectable 12.5 Gram(s) IV Push once  dextrose 50% Injectable 25 Gram(s) IV Push once  dextrose 50% Injectable 25 Gram(s) IV Push once  DULoxetine 60 milliGRAM(s) Oral daily  furosemide   Injectable 40 milliGRAM(s) IV Push two times a day  gabapentin 100 milliGRAM(s) Oral three times a day  insulin lispro (HumaLOG) corrective regimen sliding scale   SubCutaneous three times a day before meals  isosorbide   mononitrate ER Tablet (IMDUR) 60 milliGRAM(s) Oral daily  melatonin 3 milliGRAM(s) Oral at bedtime  pantoprazole    Tablet 40 milliGRAM(s) Oral before breakfast  sodium polystyrene sulfonate Suspension 15 Gram(s) Oral once  tamsulosin 0.4 milliGRAM(s) Oral at bedtime    MEDICATIONS  (PRN):  dextrose 40% Gel 15 Gram(s) Oral once PRN Blood Glucose LESS THAN 70 milliGRAM(s)/deciliter  glucagon  Injectable 1 milliGRAM(s) IntraMuscular once PRN Glucose LESS THAN 70 milligrams/deciliter        Vital Signs Last 24 Hrs  T(C): 36.8 (17 May 2019 16:25), Max: 36.8 (17 May 2019 16:25)  T(F): 98.2 (17 May 2019 16:25), Max: 98.2 (17 May 2019 16:25)  HR: 63 (17 May 2019 16:25) (63 - 68)  BP: 142/67 (17 May 2019 16:25) (141/71 - 145/75)  BP(mean): --  RR: 15 (17 May 2019 16:25) (14 - 15)  SpO2: 98% (17 May 2019 16:25) (97% - 98%)      PHYSICAL EXAM:  Constitutional- AAOx3, NAD  Neck: supple, No JVD  Cardiovascular: +S1S2 RRR, no murmurs, rubs, gallops   Pulmonary: CTA b/l, unlabored, no wheezes, rales, or rhonchi  Abdomen: +BS, soft NTND  Extremities: no edema b/l, +distal pulses b/l  Neuro: non focal, speech clear, GAN x 4  Psych: appropriate mood & affect      LABS:                        9.2    9.6   )-----------( 194      ( 17 May 2019 11:38 )             27.1         x   |  x   |  x   ----------------------------<  x   6.4<HH>   |  x   |  x     Ca    8.6      17 May 2019 12:21    TPro  6.8  /  Alb  3.3  /  TBili  1.2  /  DBili  x   /  AST  98<H>  /  ALT  132<H>  /  AlkPhos  248<H>      LIVER FUNCTIONS - ( 17 May 2019 12:21 )  Alb: 3.3 g/dL / Pro: 6.8 g/dL / ALK PHOS: 248 U/L / ALT: 132 U/L / AST: 98 U/L / GGT: x           PT/INR - ( 17 May 2019 11:38 )   PT: 16.0 sec;   INR: 1.38 ratio         PTT - ( 17 May 2019 11:38 )  PTT:24.8 sec  CARDIAC MARKERS ( 17 May 2019 12:21 )  x     / 0.10 ng/mL / x     / x     / x          Urinalysis Basic - ( 17 May 2019 12:37 )    Color: Yellow / Appearance: Clear / S.020 / pH: x  Gluc: x / Ketone: Trace  / Bili: Negative / Urobili: 4 mg/dL   Blood: x / Protein: 100 mg/dL / Nitrite: Negative   Leuk Esterase: Moderate / RBC: 6-10 /HPF / WBC 6-10   Sq Epi: x / Non Sq Epi: Occasional / Bacteria: Occasional          CARDIAC IMAGING/TESTING:      EP DATA: Ms. Coley is a very pleasant but sickly 63 yo F presented with hx of CAD, s/p CABG x1 (lima->LAD 2015), MADHAVI x2-> LCX & OM1 (2015), nuclear stress 2018 with mildly positive ischemia in RCA (medically managed), cardiac arrest due to hypercapnea, HFpEF (2018 EF 65-70%), moderate RV dysfunction and severe pulmonary HTN, PAD s/p balloon angioplasty, DM2, HTN, and HLD who presents with worsening dyspnea on exertion. She recently was discharged from a 2-month hospitalization as she was being treated for ADHF, KEITH, , GIB, and right pleural effusion s/p thoracentesis.    The patient reports that her shortness of breath started a few days ago and was associated with significant fatigue. As per the emergency department notes, the patient was noted to be "slow to respond" two days prior. The patient denied any associated chest pain or palpitations but admits to increased lower extremity swelling. She denied any orthopnea or increased urination. The patient denied any recent changes in her diet or medications and reports being compliant with her medications. She reports similar symptoms in the past for which she was admitted to the hospital a few months prior. She is unaware of any aggravating or relieving factors. She denied any associated fevers or chills. Denies chest pain/pressure, palpitations, irregular and/or rapid heart beat, syncope/near syncope, dizziness, orthopnea, PND, cough, edema, n/v/d, hematuria, or hematochezia.     Cardiology consultation was requested as she is well known to the service and follows with Dr. Recio as an outpatient.       PAST MEDICAL & SURGICAL HISTORY:  Congestive heart failure, unspecified HF chronicity, unspecified heart failure type  Insomnia  Retinal detachment  CAD (coronary artery disease)  Glaucoma  Urinary retention  Peripheral neuropathy  GERD (gastroesophageal reflux disease)  Depression  Diabetes  Hypertension, unspecified type  No significant past surgical history        FAMILY HISTORY:    negative for any inherited arrhythmias, cardiomyopathies, or SCD    SOCIAL HISTORY:  denies tobacco/EtOH/IVDU      REVIEW OF SYSTEMS:  Constitutional- No fever, weight loss, or fatigue  HEENT- No eye pain, visual disturbances, or discharge; no difficulty hearing, tinnitus, vertigo; No sinus or throat pain; no pain or stiffness  Breasts- No pain, masses, or nipple discharge  Pulmonary- as per HPI  Cardiovascular- as per HPI  Gastrointestinal- No abdominal or epigastric pain. No nausea, vomiting, or hematemesis; No diarrhea or constipation. No melena or hematochezia.  Genitourinary- No dysuria, frequency, hematuria, or incontinence  Neurological- No headaches, memory loss, loss of strength, numbness, or tremors  Dermatological- No itching, burning, rashes, or lesions   Lymphatics- No enlarged glands  Endocrine- No heat or cold intolerance; No hair loss  Musculoskeletal- No joint pain or swelling; No muscle, back, or extremity pain  Psychiatric- No depression, anxiety, mood swings, or difficulty sleeping  Hematologic- No easy bruising, or bleeding gums  Allergy & Immunology- No hives or eczema      Allergies    Allergy Status Unknown    Intolerances          MEDICATIONS  (STANDING):  aspirin enteric coated 81 milliGRAM(s) Oral daily  atorvastatin 80 milliGRAM(s) Oral at bedtime  carvedilol 6.25 milliGRAM(s) Oral every 12 hours  dextrose 5%. 1000 milliLiter(s) (50 mL/Hr) IV Continuous <Continuous>  dextrose 50% Injectable 12.5 Gram(s) IV Push once  dextrose 50% Injectable 25 Gram(s) IV Push once  dextrose 50% Injectable 25 Gram(s) IV Push once  DULoxetine 60 milliGRAM(s) Oral daily  furosemide   Injectable 40 milliGRAM(s) IV Push two times a day  gabapentin 100 milliGRAM(s) Oral three times a day  insulin lispro (HumaLOG) corrective regimen sliding scale   SubCutaneous three times a day before meals  isosorbide   mononitrate ER Tablet (IMDUR) 60 milliGRAM(s) Oral daily  melatonin 3 milliGRAM(s) Oral at bedtime  pantoprazole    Tablet 40 milliGRAM(s) Oral before breakfast  sodium polystyrene sulfonate Suspension 15 Gram(s) Oral once  tamsulosin 0.4 milliGRAM(s) Oral at bedtime    MEDICATIONS  (PRN):  dextrose 40% Gel 15 Gram(s) Oral once PRN Blood Glucose LESS THAN 70 milliGRAM(s)/deciliter  glucagon  Injectable 1 milliGRAM(s) IntraMuscular once PRN Glucose LESS THAN 70 milligrams/deciliter    Home Medications:  albuterol 2.5 mg/3 mL (0.083%) inhalation solution: 3 milliliter(s) inhaled every 4 hours, As Needed (17 May 2019 15:01)  amLODIPine 5 mg oral tablet: 2 tab(s) orally once a day (17 May 2019 15:)  Aspir 81 oral delayed release tablet: 1 tab(s) orally once a day (17 May 2019 15:)  Coreg 6.25 mg oral tablet: 1 tab(s) orally 2 times a day (17 May 2019 15:)  Cymbalta 60 mg oral delayed release capsule: 1 cap(s) orally once a day (17 May 2019 15:)  furosemide 20 mg oral tablet: 1 tab(s) orally once a day (17 May 2019 15:)  HumaLOG 100 units/mL subcutaneous solution: 8 unit(s) subcutaneous 3 times a day (before meals) (17 May 2019 15:)  isosorbide mononitrate 60 mg oral tablet, extended release: 1 tab(s) orally once a day (in the morning) (17 May 2019 15:)  Lipitor 80 mg oral tablet: 1 tab(s) orally once a day (17 May 2019 15:)  losartan 50 mg oral tablet: 1 tab(s) orally once a day (17 May 2019 15:)  Lumigan 0.01% ophthalmic solution: 1 drop(s) to each affected eye once a day (in the evening) (17 May 2019 15:)  magnesium oxide 400 mg (241.3 mg elemental magnesium) oral tablet: 1 tab(s) orally once a day (17 May 2019 15:01)  Melatonin 3 mg oral tablet: 1 tab(s) orally once (at bedtime) (17 May 2019 15:)  Neurontin 100 mg oral capsule: 1 cap(s) orally 3 times a day (17 May 2019 15:)  Percocet 5/325 oral tablet: 1 tab(s) orally every 6 hours (17 May 2019 15:)  pilocarpine 4% ophthalmic solution: 2 drop(s) to each affected eye 4 times a day (17 May 2019 15:01)  predniSONE 20 mg oral tablet: 1 tab(s) orally once a day (17 May 2019 15:01)  Protonix 40 mg oral delayed release tablet: 1 tab(s) orally once a day (17 May 2019 15:01)  tamsulosin 0.4 mg oral capsule: 1 cap(s) orally once a day (at bedtime) (17 May 2019 15:01)  torsemide 20 mg oral tablet: 1 tab(s) orally once a day (17 May 2019 15:01)      Vital Signs Last 24 Hrs  T(C): 36.8 (17 May 2019 16:25), Max: 36.8 (17 May 2019 16:25)  T(F): 98.2 (17 May 2019 16:25), Max: 98.2 (17 May 2019 16:25)  HR: 63 (17 May 2019 16:25) (63 - 68)  BP: 142/67 (17 May 2019 16:25) (141/71 - 145/75)  BP(mean): --  RR: 15 (17 May 2019 16:25) (14 - 15)  SpO2: 98% (17 May 2019 16:25) (97% - 98%)      PHYSICAL EXAM:  Constitutional- AAOx3, NAD  Neck: supple, No JVD  Cardiovascular: +S1S2 RRR, no murmurs, rubs, gallops   Pulmonary: CTA b/l, unlabored, no wheezes, rales, or rhonchi  Abdomen: +BS, soft NTND  Extremities: no edema b/l, +distal pulses b/l  Neuro: non focal, speech clear, GAN x 4  Psych: appropriate mood & affect      LABS:                        9.2    9.6   )-----------( 194      ( 17 May 2019 11:38 )             27.1         x   |  x   |  x   ----------------------------<  x   6.4<HH>   |  x   |  x     Ca    8.6      17 May 2019 12:21    TPro  6.8  /  Alb  3.3  /  TBili  1.2  /  DBili  x   /  AST  98<H>  /  ALT  132<H>  /  AlkPhos  248<H>      LIVER FUNCTIONS - ( 17 May 2019 12:21 )  Alb: 3.3 g/dL / Pro: 6.8 g/dL / ALK PHOS: 248 U/L / ALT: 132 U/L / AST: 98 U/L / GGT: x           PT/INR - ( 17 May 2019 11:38 )   PT: 16.0 sec;   INR: 1.38 ratio         PTT - ( 17 May 2019 11:38 )  PTT:24.8 sec  CARDIAC MARKERS ( 17 May 2019 12:21 )  x     / 0.10 ng/mL / x     / x     / x          Urinalysis Basic - ( 17 May 2019 12:37 )    Color: Yellow / Appearance: Clear / S.020 / pH: x  Gluc: x / Ketone: Trace  / Bili: Negative / Urobili: 4 mg/dL   Blood: x / Protein: 100 mg/dL / Nitrite: Negative   Leuk Esterase: Moderate / RBC: 6-10 /HPF / WBC 6-10   Sq Epi: x / Non Sq Epi: Occasional / Bacteria: Occasional          CARDIAC IMAGING/TESTING:  < from: Xray Chest 1 View- PORTABLE-Urgent (19 @ 11:51) >  EXAM:  XR CHEST PORTABLE URGENT 1V                          PROCEDURE DATE:  2019          INTERPRETATION:  TECHNIQUE: Single portable view of the chest.    COMPARISON: None.    CLINICAL HISTORY: lethargy    FINDINGS:    Single frontal view of the chest demonstrates moderate CHF with a small   right-sided pleural effusion. Mediastinal sternotomy wires. The   cardiomediastinal silhouette is enlarged. No acute osseous abnormalities.    IMPRESSION: Moderate CHF. Small right-sided pleural effusion.      < end of copied text > Ms. Coley is a very pleasant but sickly 65 yo F presented with hx of CAD, s/p CABG x1 (lima->LAD 2015), MADHAVI x2-> LCX & OM1 (2015), nuclear stress 2018 with mildly positive ischemia in RCA (medically managed), cardiac arrest due to hypercapnea, HFpEF (2018 EF 65-70%), moderate RV dysfunction and severe pulmonary HTN, PAD s/p balloon angioplasty, DM2, HTN, and HLD who presents with worsening dyspnea on exertion. She recently was discharged from a 2-month hospitalization as she was being treated for ADHF, KEITH, , GIB, and right pleural effusion s/p thoracentesis.    The patient reports that her shortness of breath started a few days ago and was associated with significant fatigue. As per the emergency department notes, the patient was noted to be "slow to respond" two days prior. The patient denied any associated chest pain or palpitations but admits to increased lower extremity swelling. She denied any orthopnea or increased urination. The patient denied any recent changes in her diet or medications and reports being compliant with her medications. She reports similar symptoms in the past for which she was admitted to the hospital a few months prior. She is unaware of any aggravating or relieving factors. She denied any associated fevers or chills. Denies chest pain/pressure, palpitations, irregular and/or rapid heart beat, syncope/near syncope, dizziness, orthopnea, PND, cough, edema, n/v/d, hematuria, or hematochezia.     Cardiology consultation was requested as she is well known to the service and follows with Dr. Recio as an outpatient.       PAST MEDICAL & SURGICAL HISTORY:  Congestive heart failure, unspecified HF chronicity, unspecified heart failure type  Insomnia  Retinal detachment  CAD (coronary artery disease)  Glaucoma  Urinary retention  Peripheral neuropathy  GERD (gastroesophageal reflux disease)  Depression  Diabetes  Hypertension, unspecified type  No significant past surgical history        FAMILY HISTORY:    negative for any inherited arrhythmias, cardiomyopathies, or SCD    SOCIAL HISTORY:  denies tobacco/EtOH/IVDU      REVIEW OF SYSTEMS:  Constitutional- No fever, weight loss, or fatigue  HEENT- No eye pain, visual disturbances, or discharge; no difficulty hearing, tinnitus, vertigo; No sinus or throat pain; no pain or stiffness  Breasts- No pain, masses, or nipple discharge  Pulmonary- as per HPI  Cardiovascular- as per HPI  Gastrointestinal- No abdominal or epigastric pain. No nausea, vomiting, or hematemesis; No diarrhea or constipation. No melena or hematochezia.  Genitourinary- No dysuria, frequency, hematuria, or incontinence  Neurological- No headaches, memory loss, loss of strength, numbness, or tremors  Dermatological- No itching, burning, rashes, or lesions   Lymphatics- No enlarged glands  Endocrine- No heat or cold intolerance; No hair loss  Musculoskeletal- No joint pain or swelling; No muscle, back, or extremity pain  Psychiatric- No depression, anxiety, mood swings, or difficulty sleeping  Hematologic- No easy bruising, or bleeding gums  Allergy & Immunology- No hives or eczema      Allergies    Allergy Status Unknown    Intolerances          MEDICATIONS  (STANDING):  aspirin enteric coated 81 milliGRAM(s) Oral daily  atorvastatin 80 milliGRAM(s) Oral at bedtime  carvedilol 6.25 milliGRAM(s) Oral every 12 hours  dextrose 5%. 1000 milliLiter(s) (50 mL/Hr) IV Continuous <Continuous>  dextrose 50% Injectable 12.5 Gram(s) IV Push once  dextrose 50% Injectable 25 Gram(s) IV Push once  dextrose 50% Injectable 25 Gram(s) IV Push once  DULoxetine 60 milliGRAM(s) Oral daily  furosemide   Injectable 40 milliGRAM(s) IV Push two times a day  gabapentin 100 milliGRAM(s) Oral three times a day  insulin lispro (HumaLOG) corrective regimen sliding scale   SubCutaneous three times a day before meals  isosorbide   mononitrate ER Tablet (IMDUR) 60 milliGRAM(s) Oral daily  melatonin 3 milliGRAM(s) Oral at bedtime  pantoprazole    Tablet 40 milliGRAM(s) Oral before breakfast  sodium polystyrene sulfonate Suspension 15 Gram(s) Oral once  tamsulosin 0.4 milliGRAM(s) Oral at bedtime    MEDICATIONS  (PRN):  dextrose 40% Gel 15 Gram(s) Oral once PRN Blood Glucose LESS THAN 70 milliGRAM(s)/deciliter  glucagon  Injectable 1 milliGRAM(s) IntraMuscular once PRN Glucose LESS THAN 70 milligrams/deciliter    Home Medications:  albuterol 2.5 mg/3 mL (0.083%) inhalation solution: 3 milliliter(s) inhaled every 4 hours, As Needed (17 May 2019 15:01)  amLODIPine 5 mg oral tablet: 2 tab(s) orally once a day (17 May 2019 15:)  Aspir 81 oral delayed release tablet: 1 tab(s) orally once a day (17 May 2019 15:)  Coreg 6.25 mg oral tablet: 1 tab(s) orally 2 times a day (17 May 2019 15:)  Cymbalta 60 mg oral delayed release capsule: 1 cap(s) orally once a day (17 May 2019 15:)  furosemide 20 mg oral tablet: 1 tab(s) orally once a day (17 May 2019 15:)  HumaLOG 100 units/mL subcutaneous solution: 8 unit(s) subcutaneous 3 times a day (before meals) (17 May 2019 15:)  isosorbide mononitrate 60 mg oral tablet, extended release: 1 tab(s) orally once a day (in the morning) (17 May 2019 15:)  Lipitor 80 mg oral tablet: 1 tab(s) orally once a day (17 May 2019 15:)  losartan 50 mg oral tablet: 1 tab(s) orally once a day (17 May 2019 15:)  Lumigan 0.01% ophthalmic solution: 1 drop(s) to each affected eye once a day (in the evening) (17 May 2019 15:)  magnesium oxide 400 mg (241.3 mg elemental magnesium) oral tablet: 1 tab(s) orally once a day (17 May 2019 15:01)  Melatonin 3 mg oral tablet: 1 tab(s) orally once (at bedtime) (17 May 2019 15:)  Neurontin 100 mg oral capsule: 1 cap(s) orally 3 times a day (17 May 2019 15:)  Percocet 5/325 oral tablet: 1 tab(s) orally every 6 hours (17 May 2019 15:)  pilocarpine 4% ophthalmic solution: 2 drop(s) to each affected eye 4 times a day (17 May 2019 15:01)  predniSONE 20 mg oral tablet: 1 tab(s) orally once a day (17 May 2019 15:01)  Protonix 40 mg oral delayed release tablet: 1 tab(s) orally once a day (17 May 2019 15:01)  tamsulosin 0.4 mg oral capsule: 1 cap(s) orally once a day (at bedtime) (17 May 2019 15:01)  torsemide 20 mg oral tablet: 1 tab(s) orally once a day (17 May 2019 15:01)      Vital Signs Last 24 Hrs  T(C): 36.8 (17 May 2019 16:25), Max: 36.8 (17 May 2019 16:25)  T(F): 98.2 (17 May 2019 16:25), Max: 98.2 (17 May 2019 16:25)  HR: 63 (17 May 2019 16:25) (63 - 68)  BP: 142/67 (17 May 2019 16:25) (141/71 - 145/75)  BP(mean): --  RR: 15 (17 May 2019 16:25) (14 - 15)  SpO2: 98% (17 May 2019 16:25) (97% - 98%)      PHYSICAL EXAM:  Constitutional: lethargic, but responsive, sleeping in bed  Neck: supple, No JVD  Cardiovascular: +S1S2 RRR, +BENTON   Pulmonary: CTA b/l, unlabored, no wheezes, rales, or rhonchi, absent BS at right base  Abdomen: +BS, soft NTND  Extremities: no edema b/l, +distal pulses b/l  Neuro: non focal, speech clear, GAN x 4  Psych: slow mentation, appears altered      LABS:                        9.2    9.6   )-----------( 194      ( 17 May 2019 11:38 )             27.1         x   |  x   |  x   ----------------------------<  x   6.4<HH>   |  x   |  x     Ca    8.6      17 May 2019 12:21    TPro  6.8  /  Alb  3.3  /  TBili  1.2  /  DBili  x   /  AST  98<H>  /  ALT  132<H>  /  AlkPhos  248<H>    PT/INR - ( 17 May 2019 11:38 )   PT: 16.0 sec;   INR: 1.38 ratio    PTT - ( 17 May 2019 11:38 )  PTT:24.8 sec  CARDIAC MARKERS ( 17 May 2019 12:21 )  x     / 0.10 ng/mL / x     / x     / x        Urinalysis Basic - ( 17 May 2019 12:37 )    Color: Yellow / Appearance: Clear / S.020 / pH: x  Gluc: x / Ketone: Trace  / Bili: Negative / Urobili: 4 mg/dL   Blood: x / Protein: 100 mg/dL / Nitrite: Negative   Leuk Esterase: Moderate / RBC: 6-10 /HPF / WBC 6-10   Sq Epi: x / Non Sq Epi: Occasional / Bacteria: Occasional        CARDIAC IMAGING/TESTING:  < from: Xray Chest 1 View- PORTABLE-Urgent (19 @ 11:51) >  EXAM:  XR CHEST PORTABLE URGENT 1V                          PROCEDURE DATE:  2019          INTERPRETATION:  TECHNIQUE: Single portable view of the chest.    COMPARISON: None.    CLINICAL HISTORY: lethargy    FINDINGS:    Single frontal view of the chest demonstrates moderate CHF with a small   right-sided pleural effusion. Mediastinal sternotomy wires. The   cardiomediastinal silhouette is enlarged. No acute osseous abnormalities.    IMPRESSION: Moderate CHF. Small right-sided pleural effusion.      < end of copied text > Ms. Coley is a very pleasant but sickly 65 yo F presented with hx of CAD, s/p CABG x1 (lima->LAD 2015), MADHAVI x2-> LCX & OM1 (2015), nuclear stress 2018 with mildly positive ischemia in RCA (medically managed), cardiac arrest due to hypercapnea, HFpEF (2018 EF 65-70%), moderate RV dysfunction and severe pulmonary HTN, PAD s/p balloon angioplasty, DM2, HTN, and HLD who presents with worsening dyspnea on exertion. She recently was discharged from a 2-month hospitalization as she was being treated for ADHF, KEITH, , GIB, and right pleural effusion s/p thoracentesis.    The patient reports that her shortness of breath started a few days ago and was associated with significant fatigue. As per the emergency department notes, the patient was noted to be "slow to respond" two days prior. The patient denied any associated chest pain or palpitations but admits to increased lower extremity swelling. She denied any orthopnea or increased urination. The patient denied any recent changes in her diet or medications and reports being compliant with her medications. She reports similar symptoms in the past for which she was admitted to the hospital a few months prior. She is unaware of any aggravating or relieving factors. She denied any associated fevers or chills. Denies chest pain/pressure, palpitations, irregular and/or rapid heart beat, syncope/near syncope, dizziness, orthopnea, PND, cough, edema, n/v/d, hematuria, or hematochezia.     Cardiology consultation was requested as she is well known to the service and follows with Dr. Recio as an outpatient.       PAST MEDICAL & SURGICAL HISTORY:  Congestive heart failure, unspecified HF chronicity, unspecified heart failure type  Insomnia  Retinal detachment  CAD (coronary artery disease)  Glaucoma  Urinary retention  Peripheral neuropathy  GERD (gastroesophageal reflux disease)  Depression  Diabetes  Hypertension, unspecified type  No significant past surgical history        FAMILY HISTORY:    negative for any inherited arrhythmias, cardiomyopathies, or SCD    SOCIAL HISTORY:  denies tobacco/EtOH/IVDU      REVIEW OF SYSTEMS:  Constitutional- No fever, weight loss, or fatigue  HEENT- No eye pain, visual disturbances, or discharge; no difficulty hearing, tinnitus, vertigo; No sinus or throat pain; no pain or stiffness  Breasts- No pain, masses, or nipple discharge  Pulmonary- as per HPI  Cardiovascular- as per HPI  Gastrointestinal- No abdominal or epigastric pain. No nausea, vomiting, or hematemesis; No diarrhea or constipation. No melena or hematochezia.  Genitourinary- No dysuria, frequency, hematuria, or incontinence  Neurological- No headaches, memory loss, loss of strength, numbness, or tremors  Dermatological- No itching, burning, rashes, or lesions   Lymphatics- No enlarged glands  Endocrine- No heat or cold intolerance; No hair loss  Musculoskeletal- No joint pain or swelling; No muscle, back, or extremity pain  Psychiatric- No depression, anxiety, mood swings, or difficulty sleeping  Hematologic- No easy bruising, or bleeding gums  Allergy & Immunology- No hives or eczema      Allergies    Allergy Status Unknown    Intolerances          MEDICATIONS  (STANDING):  aspirin enteric coated 81 milliGRAM(s) Oral daily  atorvastatin 80 milliGRAM(s) Oral at bedtime  carvedilol 6.25 milliGRAM(s) Oral every 12 hours  dextrose 5%. 1000 milliLiter(s) (50 mL/Hr) IV Continuous <Continuous>  dextrose 50% Injectable 12.5 Gram(s) IV Push once  dextrose 50% Injectable 25 Gram(s) IV Push once  dextrose 50% Injectable 25 Gram(s) IV Push once  DULoxetine 60 milliGRAM(s) Oral daily  furosemide   Injectable 40 milliGRAM(s) IV Push two times a day  gabapentin 100 milliGRAM(s) Oral three times a day  insulin lispro (HumaLOG) corrective regimen sliding scale   SubCutaneous three times a day before meals  isosorbide   mononitrate ER Tablet (IMDUR) 60 milliGRAM(s) Oral daily  melatonin 3 milliGRAM(s) Oral at bedtime  pantoprazole    Tablet 40 milliGRAM(s) Oral before breakfast  sodium polystyrene sulfonate Suspension 15 Gram(s) Oral once  tamsulosin 0.4 milliGRAM(s) Oral at bedtime    MEDICATIONS  (PRN):  dextrose 40% Gel 15 Gram(s) Oral once PRN Blood Glucose LESS THAN 70 milliGRAM(s)/deciliter  glucagon  Injectable 1 milliGRAM(s) IntraMuscular once PRN Glucose LESS THAN 70 milligrams/deciliter    Home Medications:  albuterol 2.5 mg/3 mL (0.083%) inhalation solution: 3 milliliter(s) inhaled every 4 hours, As Needed (17 May 2019 15:01)  amLODIPine 5 mg oral tablet: 2 tab(s) orally once a day (17 May 2019 15:)  Aspir 81 oral delayed release tablet: 1 tab(s) orally once a day (17 May 2019 15:)  Coreg 6.25 mg oral tablet: 1 tab(s) orally 2 times a day (17 May 2019 15:)  Cymbalta 60 mg oral delayed release capsule: 1 cap(s) orally once a day (17 May 2019 15:)  furosemide 20 mg oral tablet: 1 tab(s) orally once a day (17 May 2019 15:)  HumaLOG 100 units/mL subcutaneous solution: 8 unit(s) subcutaneous 3 times a day (before meals) (17 May 2019 15:)  isosorbide mononitrate 60 mg oral tablet, extended release: 1 tab(s) orally once a day (in the morning) (17 May 2019 15:)  Lipitor 80 mg oral tablet: 1 tab(s) orally once a day (17 May 2019 15:)  losartan 50 mg oral tablet: 1 tab(s) orally once a day (17 May 2019 15:)  Lumigan 0.01% ophthalmic solution: 1 drop(s) to each affected eye once a day (in the evening) (17 May 2019 15:)  magnesium oxide 400 mg (241.3 mg elemental magnesium) oral tablet: 1 tab(s) orally once a day (17 May 2019 15:01)  Melatonin 3 mg oral tablet: 1 tab(s) orally once (at bedtime) (17 May 2019 15:)  Neurontin 100 mg oral capsule: 1 cap(s) orally 3 times a day (17 May 2019 15:)  Percocet 5/325 oral tablet: 1 tab(s) orally every 6 hours (17 May 2019 15:)  pilocarpine 4% ophthalmic solution: 2 drop(s) to each affected eye 4 times a day (17 May 2019 15:01)  predniSONE 20 mg oral tablet: 1 tab(s) orally once a day (17 May 2019 15:01)  Protonix 40 mg oral delayed release tablet: 1 tab(s) orally once a day (17 May 2019 15:01)  tamsulosin 0.4 mg oral capsule: 1 cap(s) orally once a day (at bedtime) (17 May 2019 15:01)  torsemide 20 mg oral tablet: 1 tab(s) orally once a day (17 May 2019 15:01)      Vital Signs Last 24 Hrs  T(C): 36.8 (17 May 2019 16:25), Max: 36.8 (17 May 2019 16:25)  T(F): 98.2 (17 May 2019 16:25), Max: 98.2 (17 May 2019 16:25)  HR: 63 (17 May 2019 16:25) (63 - 68)  BP: 142/67 (17 May 2019 16:25) (141/71 - 145/75)  BP(mean): --  RR: 15 (17 May 2019 16:25) (14 - 15)  SpO2: 98% (17 May 2019 16:25) (97% - 98%)      PHYSICAL EXAM:  Constitutional: lethargic, but responsive, sleeping in bed  Neck: supple, No JVD  Cardiovascular: +S1S2 RRR, +BENTON   Pulmonary: CTA b/l, unlabored, no wheezes, rales, or rhonchi, absent BS at right base  Abdomen: +BS, soft NTND  Extremities: no edema b/l, +distal pulses b/l  Neuro: non focal, speech clear, GAN x 4  Psych: slow mentation, appears altered      LABS:                        9.2    9.6   )-----------( 194      ( 17 May 2019 11:38 )             27.1         x   |  x   |  x   ----------------------------<  x   6.4<HH>   |  x   |  x     Ca    8.6      17 May 2019 12:21    TPro  6.8  /  Alb  3.3  /  TBili  1.2  /  DBili  x   /  AST  98<H>  /  ALT  132<H>  /  AlkPhos  248<H>    PT/INR - ( 17 May 2019 11:38 )   PT: 16.0 sec;   INR: 1.38 ratio    PTT - ( 17 May 2019 11:38 )  PTT:24.8 sec  CARDIAC MARKERS ( 17 May 2019 12:21 )  x     / 0.10 ng/mL / x     / x     / x        Urinalysis Basic - ( 17 May 2019 12:37 )    Color: Yellow / Appearance: Clear / S.020 / pH: x  Gluc: x / Ketone: Trace  / Bili: Negative / Urobili: 4 mg/dL   Blood: x / Protein: 100 mg/dL / Nitrite: Negative   Leuk Esterase: Moderate / RBC: 6-10 /HPF / WBC 6-10   Sq Epi: x / Non Sq Epi: Occasional / Bacteria: Occasional        CARDIAC IMAGING/TESTING:  < from: Xray Chest 1 View- PORTABLE-Urgent (19 @ 11:51) >  EXAM:  XR CHEST PORTABLE URGENT 1V                          PROCEDURE DATE:  2019          INTERPRETATION:  TECHNIQUE: Single portable view of the chest.    COMPARISON: None.    CLINICAL HISTORY: lethargy    FINDINGS:    Single frontal view of the chest demonstrates moderate CHF with a small   right-sided pleural effusion. Mediastinal sternotomy wires. The   cardiomediastinal silhouette is enlarged. No acute osseous abnormalities.    IMPRESSION: Moderate CHF. Small right-sided pleural effusion.      < end of copied text >    RHC 18 DIAGNOSTIC IMPRESSIONS:  Severe pulmonary hypertension.  Severely elevated wedge pressure.  Poor cardiac output with low mixed venous saturation.    TTE 2/15/19 Summary:   1. Technically suboptimal study.   2. Left ventricular ejection fraction, by visual estimation, is >75%.   3. Hyperdynamic global left ventricular systolic function.   4. There is mild concentric left ventricular hypertrophy.   5. Mild mitral annular calcification.   6. Moderate mitral valve regurgitation.   7. Thickening and calcification of the anterior and posterior mitral valve leaflets.   8. Moderate-severe tricuspid regurgitation.   9. Estimated pulmonary artery systolic pressure is 55.7 mmHg assuming a right atrial pressure of 5 mmHg, which is consistent with moderate pulmonary hypertension.  10. Endocardial visualization was enhanced with intravenous echo contrast.

## 2019-05-17 NOTE — ED PROVIDER NOTE - PROGRESS NOTE DETAILS
AJM: + renal failure, hyperK. treatment given. + pulm edema. lasix given. admit and signed out to dr greene

## 2019-05-17 NOTE — ED ADULT NURSE NOTE - NSIMPLEMENTINTERV_GEN_ALL_ED
Implemented All Fall Risk Interventions:  [place] to call system. Call bell, personal items and telephone within reach. Instruct patient to call for assistance. Room bathroom lighting operational. Non-slip footwear when patient is off stretcher. Physically safe environment: no spills, clutter or unnecessary equipment. Stretcher in lowest position, wheels locked, appropriate side rails in place. Provide visual cue, wrist band, yellow gown, etc. Monitor gait and stability. Monitor for mental status changes and reorient to person, place, and time. Review medications for side effects contributing to fall risk. Reinforce activity limits and safety measures with patient and family.

## 2019-05-17 NOTE — H&P ADULT - NSICDXPASTMEDICALHX_GEN_ALL_CORE_FT
PAST MEDICAL HISTORY:  CAD (coronary artery disease)     Congestive heart failure, unspecified HF chronicity, unspecified heart failure type     Depression     Diabetes     GERD (gastroesophageal reflux disease)     Glaucoma     Hypertension, unspecified type     Insomnia     Peripheral neuropathy     Retinal detachment     Urinary retention

## 2019-05-17 NOTE — H&P ADULT - ASSESSMENT
64F with a history of CABG, CHF, diabetes, GERD with prior GI bleed, peripheral vascular disease and neuropathy presented with increased dyspnea and fatigue. She was found to have hypoxia(88% on ambient air), lower extremity edema, and chest Xray consistent with pulmonary edema. Laboratory studies also noted acute kidney injury and hyperkalemia.    Acute on chronic diastolic heart failure - Prior echocardiogram(under different chart MR#[id number]) noted preserved ventricular function and moderate pulmonary hypertension. Intravenous furosemide initiated for diuresis. On carvedilol and isosorbide. Losartan to be discontinued due to acute kidney injury and hyperkalemia. Cardiology consultation pending.    Acute kidney injury / Hyperkalemia - Losartan held on admission. Kayexalate for hyperkalemia. No distended bladder on examination and the patient denied any change in urination. Continue on tamsulosin for history of urine retention. Repeat laboratory studies ordered to monitor.    Coronary artery disease - No chest pain. Initial troponin elevated in the setting of acute kidney injury and heart failure. Serial troponin levels to be followed. On aspirin and atorvastatin.    Diabetes - Insulin coverage, close monitoring of blood glucose levels. Gabapentin for neuropathy.    GERD / History of GI bleed - On pantoprazole. Diet as tolerated.

## 2019-05-17 NOTE — CONSULT NOTE ADULT - PROBLEM SELECTOR RECOMMENDATION 5
- diurese as above  - avoid volume depletion secondary to preload dependent state in setting of RV failure  - at this time unclear if TR is secondary to RV dysfunction or serves as etiology for dysfunction

## 2019-05-18 DIAGNOSIS — I50.9 HEART FAILURE, UNSPECIFIED: ICD-10-CM

## 2019-05-18 DIAGNOSIS — E87.5 HYPERKALEMIA: ICD-10-CM

## 2019-05-18 LAB
ANION GAP SERPL CALC-SCNC: 10 MMOL/L — SIGNIFICANT CHANGE UP (ref 5–17)
BUN SERPL-MCNC: 52 MG/DL — HIGH (ref 8–20)
CALCIUM SERPL-MCNC: 8.8 MG/DL — SIGNIFICANT CHANGE UP (ref 8.6–10.2)
CHLORIDE SERPL-SCNC: 100 MMOL/L — SIGNIFICANT CHANGE UP (ref 98–107)
CO2 SERPL-SCNC: 26 MMOL/L — SIGNIFICANT CHANGE UP (ref 22–29)
CREAT SERPL-MCNC: 1.52 MG/DL — HIGH (ref 0.5–1.3)
GLUCOSE BLDC GLUCOMTR-MCNC: 153 MG/DL — HIGH (ref 70–99)
GLUCOSE BLDC GLUCOMTR-MCNC: 225 MG/DL — HIGH (ref 70–99)
GLUCOSE BLDC GLUCOMTR-MCNC: 226 MG/DL — HIGH (ref 70–99)
GLUCOSE BLDC GLUCOMTR-MCNC: 246 MG/DL — HIGH (ref 70–99)
GLUCOSE SERPL-MCNC: 149 MG/DL — HIGH (ref 70–115)
HBA1C BLD-MCNC: 7.7 % — HIGH (ref 4–5.6)
POTASSIUM SERPL-MCNC: 5.2 MMOL/L — SIGNIFICANT CHANGE UP (ref 3.5–5.3)
POTASSIUM SERPL-MCNC: 5.8 MMOL/L — HIGH (ref 3.5–5.3)
POTASSIUM SERPL-SCNC: 5.2 MMOL/L — SIGNIFICANT CHANGE UP (ref 3.5–5.3)
POTASSIUM SERPL-SCNC: 5.8 MMOL/L — HIGH (ref 3.5–5.3)
SODIUM SERPL-SCNC: 136 MMOL/L — SIGNIFICANT CHANGE UP (ref 135–145)
TROPONIN T SERPL-MCNC: 0.1 NG/ML — HIGH (ref 0–0.06)

## 2019-05-18 PROCEDURE — 93010 ELECTROCARDIOGRAM REPORT: CPT

## 2019-05-18 PROCEDURE — 99233 SBSQ HOSP IP/OBS HIGH 50: CPT

## 2019-05-18 RX ORDER — INSULIN GLARGINE 100 [IU]/ML
15 INJECTION, SOLUTION SUBCUTANEOUS AT BEDTIME
Refills: 0 | Status: DISCONTINUED | OUTPATIENT
Start: 2019-05-18 | End: 2019-05-23

## 2019-05-18 RX ADMIN — GABAPENTIN 100 MILLIGRAM(S): 400 CAPSULE ORAL at 14:30

## 2019-05-18 RX ADMIN — INSULIN GLARGINE 15 UNIT(S): 100 INJECTION, SOLUTION SUBCUTANEOUS at 21:46

## 2019-05-18 RX ADMIN — PANTOPRAZOLE SODIUM 40 MILLIGRAM(S): 20 TABLET, DELAYED RELEASE ORAL at 05:26

## 2019-05-18 RX ADMIN — Medication 40 MILLIGRAM(S): at 17:45

## 2019-05-18 RX ADMIN — ATORVASTATIN CALCIUM 80 MILLIGRAM(S): 80 TABLET, FILM COATED ORAL at 21:46

## 2019-05-18 RX ADMIN — CARVEDILOL PHOSPHATE 6.25 MILLIGRAM(S): 80 CAPSULE, EXTENDED RELEASE ORAL at 17:45

## 2019-05-18 RX ADMIN — Medication 3 MILLIGRAM(S): at 21:46

## 2019-05-18 RX ADMIN — CARVEDILOL PHOSPHATE 6.25 MILLIGRAM(S): 80 CAPSULE, EXTENDED RELEASE ORAL at 08:13

## 2019-05-18 RX ADMIN — DULOXETINE HYDROCHLORIDE 60 MILLIGRAM(S): 30 CAPSULE, DELAYED RELEASE ORAL at 11:22

## 2019-05-18 RX ADMIN — Medication 40 MILLIGRAM(S): at 08:13

## 2019-05-18 RX ADMIN — Medication 2: at 08:14

## 2019-05-18 RX ADMIN — GABAPENTIN 100 MILLIGRAM(S): 400 CAPSULE ORAL at 21:46

## 2019-05-18 RX ADMIN — Medication 4: at 17:45

## 2019-05-18 RX ADMIN — Medication 4: at 11:22

## 2019-05-18 RX ADMIN — Medication 81 MILLIGRAM(S): at 11:22

## 2019-05-18 RX ADMIN — TAMSULOSIN HYDROCHLORIDE 0.4 MILLIGRAM(S): 0.4 CAPSULE ORAL at 21:46

## 2019-05-18 RX ADMIN — GABAPENTIN 100 MILLIGRAM(S): 400 CAPSULE ORAL at 05:26

## 2019-05-18 RX ADMIN — ISOSORBIDE MONONITRATE 60 MILLIGRAM(S): 60 TABLET, EXTENDED RELEASE ORAL at 11:22

## 2019-05-18 NOTE — PROGRESS NOTE ADULT - SUBJECTIVE AND OBJECTIVE BOX
seen for shortness of breath, lethargy    improved sob. almost at baseline  ros negative    MEDICATIONS  (STANDING):  aspirin enteric coated 81 milliGRAM(s) Oral daily  atorvastatin 80 milliGRAM(s) Oral at bedtime  carvedilol 6.25 milliGRAM(s) Oral every 12 hours  dextrose 5%. 1000 milliLiter(s) (50 mL/Hr) IV Continuous <Continuous>  dextrose 50% Injectable 12.5 Gram(s) IV Push once  dextrose 50% Injectable 25 Gram(s) IV Push once  dextrose 50% Injectable 25 Gram(s) IV Push once  DULoxetine 60 milliGRAM(s) Oral daily  furosemide   Injectable 40 milliGRAM(s) IV Push two times a day  gabapentin 100 milliGRAM(s) Oral three times a day  insulin glargine Injectable (LANTUS) 15 Unit(s) SubCutaneous at bedtime  insulin lispro (HumaLOG) corrective regimen sliding scale   SubCutaneous three times a day before meals  isosorbide   mononitrate ER Tablet (IMDUR) 60 milliGRAM(s) Oral daily  melatonin 3 milliGRAM(s) Oral at bedtime  pantoprazole    Tablet 40 milliGRAM(s) Oral before breakfast  tamsulosin 0.4 milliGRAM(s) Oral at bedtime    MEDICATIONS  (PRN):  dextrose 40% Gel 15 Gram(s) Oral once PRN Blood Glucose LESS THAN 70 milliGRAM(s)/deciliter  glucagon  Injectable 1 milliGRAM(s) IntraMuscular once PRN Glucose LESS THAN 70 milligrams/deciliter      Allergies    Allergy Status Unknown    Vital Signs Last 24 Hrs  T(C): 36.7 (18 May 2019 15:58), Max: 36.8 (17 May 2019 16:25)  T(F): 98.1 (18 May 2019 15:58), Max: 98.2 (17 May 2019 16:25)  HR: 69 (18 May 2019 15:58) (62 - 69)  BP: 144/71 (18 May 2019 15:58) (100/65 - 144/71)  BP(mean): --  RR: 18 (18 May 2019 15:58) (15 - 18)  SpO2: 98% (18 May 2019 15:58) (92% - 98%)    PHYSICAL EXAM:    GENERAL: NAD  CHEST/LUNG: dec bs right 1/3 chest wall, clear otherwise no wheeze   HEART: Regular rate and rhythm; S1 S2  ABDOMEN: Soft, Bowel sounds present  EXTREMITIES: +2 tense edema   NERVOUS SYSTEM:  Alert & Oriented X3, nonfocal    LABS:                        9.2    9.6   )-----------( 194      ( 17 May 2019 11:38 )             27.1     05-    136  |  100  |  52.0<H>  ----------------------------<  149<H>  5.8<H>   |  26.0  |  1.52<H>    Ca    8.8      18 May 2019 06:26    TPro  6.8  /  Alb  3.3  /  TBili  1.2  /  DBili  x   /  AST  98<H>  /  ALT  132<H>  /  AlkPhos  248<H>      PT/INR - ( 17 May 2019 11:38 )   PT: 16.0 sec;   INR: 1.38 ratio         PTT - ( 17 May 2019 11:38 )  PTT:24.8 sec  Urinalysis Basic - ( 17 May 2019 12:37 )    Color: Yellow / Appearance: Clear / S.020 / pH: x  Gluc: x / Ketone: Trace  / Bili: Negative / Urobili: 4 mg/dL   Blood: x / Protein: 100 mg/dL / Nitrite: Negative   Leuk Esterase: Moderate / RBC: 6-10 /HPF / WBC 6-10   Sq Epi: x / Non Sq Epi: Occasional / Bacteria: Occasional        CAPILLARY BLOOD GLUCOSE      POCT Blood Glucose.: 246 mg/dL (18 May 2019 11:21)  POCT Blood Glucose.: 153 mg/dL (18 May 2019 08:12)        RADIOLOGY & ADDITIONAL TESTS:

## 2019-05-18 NOTE — PROGRESS NOTE ADULT - PROBLEM SELECTOR PLAN 4
- Patient's K 5.8 this morning, received kayexalate yesterday with resolve to 5.2  - EKG today with no changes c/w hyperkalemia  - IV lasix at this time  - Check BMP this afternoon

## 2019-05-18 NOTE — PROGRESS NOTE ADULT - PROBLEM SELECTOR PLAN 2
- Large re-accumulating right pleural effusion  - Recommend thoracic surgery evaluation for thoracentesis vs. chest tube.  - Continue diuresis as above - Large re-accumulating right pleural effusion  - Continue diuresis as above  -t/c chest tube if symptomatic or not improving

## 2019-05-18 NOTE — PROGRESS NOTE ADULT - SUBJECTIVE AND OBJECTIVE BOX
[facility]                                                        Office: [address] [address]                                                       Telephone: [phone]. Fax:[phone]                                                                             PROGRESS NOTE   Reason for follow up: Acute on chronic HFpEF                              Overnight: No new events.   Update: Patient states that she is feeling better, and denies ever experiencing any shortness of breath on exertion prior to coming in. Patient states that she has actually been experiencing dizziness with movement. Denies dizziness at this time.     Subjective: " I feel ok."   Complains of: Dizziness  Review of symptoms: Cardiac:  No chest pain. No dyspnea. No palpitations.  Respiratory:no cough. No dyspnea  Gastrointestinal: No diarrhea. No abdominal pain. No bleeding.     Past medical history: No updates.   Chronic conditions:  Hypertension: controlled. CHF: Stable. CAD: Stable ischemic heart disease.  	  Vitals:  T(C): 36.4 (05-18-19 @ 07:55), Max: 36.8 (05-17-19 @ 16:25)  HR: 69 (05-18-19 @ 07:55) (62 - 69)  BP: 135/72 (05-18-19 @ 07:55) (100/65 - 145/75)  RR: 17 (05-18-19 @ 07:55) (14 - 17)  SpO2: 96% (05-18-19 @ 07:55) (92% - 98%)  Wt(kg): --  I&O's Summary        PHYSICAL EXAM:  Appearance: Comfortable. No acute distress  HEENT:  Head and neck: Atraumatic. Normocephalic.  Normal oral mucosa, PERRL, Neck is supple. No JVD, No carotid bruit.   Neurologic: A & O x 3, no focal deficits. EOMI , Cranial nerves are intact.  Lymphatic: No cervical lymphadenopathy  Cardiovascular: Normal S1 S2, III/VI systolic murmur lsb, rubs/gallops. No JVD, trace edema  Respiratory: Trace rales at b/l bases  Gastrointestinal:  Soft, Non-tender, + BS  Lower Extremities: No edema  Psychiatry: Patient is calm. No agitation. Mood & affect appropriate  Skin: No rashes/ ecchymoses/cyanosis/ulcers visualized on the face, hands or feet.    CURRENT MEDICATIONS:  carvedilol 6.25 milliGRAM(s) Oral every 12 hours  furosemide   Injectable 40 milliGRAM(s) IV Push two times a day  isosorbide   mononitrate ER Tablet (IMDUR) 60 milliGRAM(s) Oral daily  tamsulosin 0.4 milliGRAM(s) Oral at bedtime    DULoxetine  gabapentin  melatonin  pantoprazole    Tablet  atorvastatin  dextrose 50% Injectable  dextrose 50% Injectable  dextrose 50% Injectable  insulin lispro (HumaLOG) corrective regimen sliding scale  aspirin enteric coated  dextrose 5%.      LABS:	 	  CARDIAC MARKERS ( 18 May 2019 06:26 )  x     / 0.10 ng/mL / x     / x     / x      p-BNP 18 May 2019 06:26: x    , CARDIAC MARKERS ( 17 May 2019 19:28 )  x     / 0.12 ng/mL / x     / x     / x      p-BNP 17 May 2019 19:28: x    , CARDIAC MARKERS ( 17 May 2019 12:21 )  x     / 0.10 ng/mL / x     / x     / x      p-BNP 17 May 2019 12:21: x    , CARDIAC MARKERS ( 17 May 2019 11:39 )  x     / x     / x     / x     / x      p-BNP 17 May 2019 11:39: 7026 pg/mL                            9.2    9.6   )-----------( 194      ( 17 May 2019 11:38 )             27.1     05-18    136  |  100  |  52.0<H>  ----------------------------<  149<H>  5.8<H>   |  26.0  |  1.52<H>    Ca    8.8      18 May 2019 06:26    TPro  6.8  /  Alb  3.3  /  TBili  1.2  /  DBili  x   /  AST  98<H>  /  ALT  132<H>  /  AlkPhos  248<H>  05-17    proBNP: Serum Pro-Brain Natriuretic Peptide: 7026 pg/mL (05-17 @ 11:39)    Lipid Profile:   HgA1c: Hemoglobin A1C, Whole Blood: 7.7 %  TSH: Thyroid Stimulating Hormone, Serum: 1.10 uIU/mL      TELEMETRY: Reviewed  NSR  ECG:  Reviewed by me. 	NSR, t wave inversions inferiorlaterally, unchanged from previous.     DIAGNOSTIC TESTING:    [facility] 12/4/18 DIAGNOSTIC IMPRESSIONS:  Severe pulmonary hypertension.  Severely elevated wedge pressure.  Poor cardiac output with low mixed venous saturation.    TTE 2/15/19 Summary:   1. Technically suboptimal study.   2. Left ventricular ejection fraction, by visual estimation, is >75%.   3. Hyperdynamic global left ventricular systolic function.   4. There is mild concentric left ventricular hypertrophy.   5. Mild mitral annular calcification.   6. Moderate mitral valve regurgitation.   7. Thickening and calcification of the anterior and posterior mitral valve leaflets.   8. Moderate-severe tricuspid regurgitation.   9. Estimated pulmonary artery systolic pressure is 55.7 mmHg assuming a right atrial pressure of 5 mmHg, which is consistent with moderate pulmonary hypertension.  10. Endocardial visualization was enhanced with intravenous echo contrast.    OTHER:   < from: Xray Chest 1 View- PORTABLE-Urgent (05.17.19 @ 11:51) >   EXAM:  XR CHEST PORTABLE URGENT 1V                          PROCEDURE DATE:  05/17/2019          INTERPRETATION:  TECHNIQUE: Single portable view of the chest.    COMPARISON: None.    CLINICAL HISTORY: lethargy    FINDINGS:    Single frontal view of the chest demonstrates moderate CHF with a small   right-sided pleural effusion. Mediastinal sternotomy wires. The   cardiomediastinal silhouette is enlarged. No acute osseous abnormalities.    IMPRESSION: Moderate CHF. Small right-sided pleural effusion.    < end of copied text >

## 2019-05-18 NOTE — PROGRESS NOTE ADULT - ASSESSMENT
64F with a history of CABG, Diastolic HF, diabetes, GERD with prior GI bleed, peripheral vascular disease and neuropathy presented with increased dyspnea and fatigue. She was found to have hypoxia (88% on ambient air), lower extremity edema, and chest Xray consistent with pulmonary edema. Laboratory studies also noted acute kidney injury and hyperkalemia.    Acute on chronic diastolic heart failure - Prior echocardiogram(under different chart MR#[id number]) noted preserved ventricular function and moderate pulmonary hypertension. Intravenous furosemide initiated for diuresis. On carvedilol and isosorbide. Losartan to be discontinued due to acute kidney injury and hyperkalemia. Cardiology following    Acute kidney injury / Hyperkalemia - Losartan held on admission. Kayexalate for hyperkalemia.  improving. Continue on tamsulosin for history of urine retention. Repeat laboratory studies ordered to monitor.    Coronary artery disease - No chest pain. serial troponin elevated but flat in the setting of acute kidney injury and heart failure. On aspirin and atorvastatin.    Diabetes - Insulin coverage, close monitoring of blood glucose levels. Gabapentin for neuropathy.    GERD / History of GI bleed - On pantoprazole. Diet as tolerated.    right pleural effusion: no respiratory compromise. repeat CXR post IV diuresis.    anemia of chronic disease: iron panel.     PT evaluation

## 2019-05-19 DIAGNOSIS — N28.9 DISORDER OF KIDNEY AND URETER, UNSPECIFIED: ICD-10-CM

## 2019-05-19 DIAGNOSIS — D64.9 ANEMIA, UNSPECIFIED: ICD-10-CM

## 2019-05-19 LAB
-  AMIKACIN: SIGNIFICANT CHANGE UP
-  AMPICILLIN/SULBACTAM: SIGNIFICANT CHANGE UP
-  AMPICILLIN: SIGNIFICANT CHANGE UP
-  AZTREONAM: SIGNIFICANT CHANGE UP
-  CEFAZOLIN: SIGNIFICANT CHANGE UP
-  CEFEPIME: SIGNIFICANT CHANGE UP
-  CEFOXITIN: SIGNIFICANT CHANGE UP
-  CEFTRIAXONE: SIGNIFICANT CHANGE UP
-  CIPROFLOXACIN: SIGNIFICANT CHANGE UP
-  ERTAPENEM: SIGNIFICANT CHANGE UP
-  GENTAMICIN: SIGNIFICANT CHANGE UP
-  IMIPENEM: SIGNIFICANT CHANGE UP
-  LEVOFLOXACIN: SIGNIFICANT CHANGE UP
-  MEROPENEM: SIGNIFICANT CHANGE UP
-  NITROFURANTOIN: SIGNIFICANT CHANGE UP
-  PIPERACILLIN/TAZOBACTAM: SIGNIFICANT CHANGE UP
-  TIGECYCLINE: SIGNIFICANT CHANGE UP
-  TOBRAMYCIN: SIGNIFICANT CHANGE UP
-  TRIMETHOPRIM/SULFAMETHOXAZOLE: SIGNIFICANT CHANGE UP
ALBUMIN SERPL ELPH-MCNC: 3.1 G/DL — LOW (ref 3.3–5.2)
ALP SERPL-CCNC: 190 U/L — HIGH (ref 40–120)
ALT FLD-CCNC: 99 U/L — HIGH
ANION GAP SERPL CALC-SCNC: 11 MMOL/L — SIGNIFICANT CHANGE UP (ref 5–17)
AST SERPL-CCNC: 58 U/L — HIGH
BILIRUB SERPL-MCNC: 0.9 MG/DL — SIGNIFICANT CHANGE UP (ref 0.4–2)
BUN SERPL-MCNC: 49 MG/DL — HIGH (ref 8–20)
CALCIUM SERPL-MCNC: 8.5 MG/DL — LOW (ref 8.6–10.2)
CHLORIDE SERPL-SCNC: 98 MMOL/L — SIGNIFICANT CHANGE UP (ref 98–107)
CO2 SERPL-SCNC: 26 MMOL/L — SIGNIFICANT CHANGE UP (ref 22–29)
CREAT SERPL-MCNC: 1.56 MG/DL — HIGH (ref 0.5–1.3)
CULTURE RESULTS: SIGNIFICANT CHANGE UP
FERRITIN SERPL-MCNC: 592 NG/ML — HIGH (ref 15–150)
GLUCOSE BLDC GLUCOMTR-MCNC: 171 MG/DL — HIGH (ref 70–99)
GLUCOSE BLDC GLUCOMTR-MCNC: 177 MG/DL — HIGH (ref 70–99)
GLUCOSE BLDC GLUCOMTR-MCNC: 230 MG/DL — HIGH (ref 70–99)
GLUCOSE BLDC GLUCOMTR-MCNC: 258 MG/DL — HIGH (ref 70–99)
GLUCOSE SERPL-MCNC: 168 MG/DL — HIGH (ref 70–115)
HCT VFR BLD CALC: 23.5 % — LOW (ref 37–47)
HCV AB S/CO SERPL IA: 0.07 S/CO — SIGNIFICANT CHANGE UP (ref 0–0.99)
HCV AB SERPL-IMP: SIGNIFICANT CHANGE UP
HGB BLD-MCNC: 8.4 G/DL — LOW (ref 12–16)
IRON SATN MFR SERPL: 13 % — LOW (ref 14–50)
IRON SATN MFR SERPL: 37 UG/DL — SIGNIFICANT CHANGE UP (ref 37–145)
MCHC RBC-ENTMCNC: 31 PG — SIGNIFICANT CHANGE UP (ref 27–31)
MCHC RBC-ENTMCNC: 35.7 G/DL — SIGNIFICANT CHANGE UP (ref 32–36)
MCV RBC AUTO: 86.7 FL — SIGNIFICANT CHANGE UP (ref 81–99)
METHOD TYPE: SIGNIFICANT CHANGE UP
ORGANISM # SPEC MICROSCOPIC CNT: SIGNIFICANT CHANGE UP
ORGANISM # SPEC MICROSCOPIC CNT: SIGNIFICANT CHANGE UP
PLATELET # BLD AUTO: 188 K/UL — SIGNIFICANT CHANGE UP (ref 150–400)
POTASSIUM SERPL-MCNC: 4.2 MMOL/L — SIGNIFICANT CHANGE UP (ref 3.5–5.3)
POTASSIUM SERPL-SCNC: 4.2 MMOL/L — SIGNIFICANT CHANGE UP (ref 3.5–5.3)
PROT SERPL-MCNC: 6.6 G/DL — SIGNIFICANT CHANGE UP (ref 6.6–8.7)
RBC # BLD: 2.71 M/UL — LOW (ref 4.4–5.2)
RBC # FLD: 17.8 % — HIGH (ref 11–15.6)
SODIUM SERPL-SCNC: 135 MMOL/L — SIGNIFICANT CHANGE UP (ref 135–145)
SPECIMEN SOURCE: SIGNIFICANT CHANGE UP
TIBC SERPL-MCNC: 287 UG/DL — SIGNIFICANT CHANGE UP (ref 220–430)
TRANSFERRIN SERPL-MCNC: 201 MG/DL — SIGNIFICANT CHANGE UP (ref 192–382)
WBC # BLD: 6.6 K/UL — SIGNIFICANT CHANGE UP (ref 4.8–10.8)
WBC # FLD AUTO: 6.6 K/UL — SIGNIFICANT CHANGE UP (ref 4.8–10.8)

## 2019-05-19 PROCEDURE — 71250 CT THORAX DX C-: CPT | Mod: 26

## 2019-05-19 PROCEDURE — 99233 SBSQ HOSP IP/OBS HIGH 50: CPT

## 2019-05-19 PROCEDURE — 99232 SBSQ HOSP IP/OBS MODERATE 35: CPT

## 2019-05-19 PROCEDURE — 71045 X-RAY EXAM CHEST 1 VIEW: CPT | Mod: 26

## 2019-05-19 RX ORDER — NYSTATIN CREAM 100000 [USP'U]/G
1 CREAM TOPICAL
Refills: 0 | Status: DISCONTINUED | OUTPATIENT
Start: 2019-05-19 | End: 2019-05-23

## 2019-05-19 RX ADMIN — DULOXETINE HYDROCHLORIDE 60 MILLIGRAM(S): 30 CAPSULE, DELAYED RELEASE ORAL at 12:08

## 2019-05-19 RX ADMIN — Medication 81 MILLIGRAM(S): at 12:08

## 2019-05-19 RX ADMIN — Medication 40 MILLIGRAM(S): at 18:13

## 2019-05-19 RX ADMIN — Medication 2: at 09:11

## 2019-05-19 RX ADMIN — TAMSULOSIN HYDROCHLORIDE 0.4 MILLIGRAM(S): 0.4 CAPSULE ORAL at 21:42

## 2019-05-19 RX ADMIN — PANTOPRAZOLE SODIUM 40 MILLIGRAM(S): 20 TABLET, DELAYED RELEASE ORAL at 06:39

## 2019-05-19 RX ADMIN — ISOSORBIDE MONONITRATE 60 MILLIGRAM(S): 60 TABLET, EXTENDED RELEASE ORAL at 12:10

## 2019-05-19 RX ADMIN — Medication 2: at 13:14

## 2019-05-19 RX ADMIN — GABAPENTIN 100 MILLIGRAM(S): 400 CAPSULE ORAL at 21:42

## 2019-05-19 RX ADMIN — INSULIN GLARGINE 15 UNIT(S): 100 INJECTION, SOLUTION SUBCUTANEOUS at 21:43

## 2019-05-19 RX ADMIN — GABAPENTIN 100 MILLIGRAM(S): 400 CAPSULE ORAL at 06:39

## 2019-05-19 RX ADMIN — Medication 6: at 18:12

## 2019-05-19 RX ADMIN — ATORVASTATIN CALCIUM 80 MILLIGRAM(S): 80 TABLET, FILM COATED ORAL at 21:43

## 2019-05-19 RX ADMIN — CARVEDILOL PHOSPHATE 6.25 MILLIGRAM(S): 80 CAPSULE, EXTENDED RELEASE ORAL at 06:39

## 2019-05-19 RX ADMIN — Medication 3 MILLIGRAM(S): at 21:42

## 2019-05-19 RX ADMIN — CARVEDILOL PHOSPHATE 6.25 MILLIGRAM(S): 80 CAPSULE, EXTENDED RELEASE ORAL at 18:13

## 2019-05-19 RX ADMIN — GABAPENTIN 100 MILLIGRAM(S): 400 CAPSULE ORAL at 13:21

## 2019-05-19 RX ADMIN — Medication 40 MILLIGRAM(S): at 06:39

## 2019-05-19 NOTE — PROGRESS NOTE ADULT - ASSESSMENT
64F with a history of CABG, Diastolic HF, diabetes, GERD with prior GI bleed, peripheral vascular disease and neuropathy presented with increased dyspnea and fatigue. She was found to have hypoxia (88% on ambient air), lower extremity edema, and chest Xray consistent with pulmonary edema. Laboratory studies also noted acute kidney injury and hyperkalemia.    Acute on chronic diastolic heart failure - Prior echocardiogram(under different chart MR#[id number]) noted preserved ventricular function and moderate pulmonary hypertension. Intravenous furosemide initiated for diuresis. On carvedilol and isosorbide. Losartan to be discontinued due to acute kidney injury and hyperkalemia. Cardiology following    Acute kidney injury / Hyperkalemia - Losartan held on admission. Kayexalate for hyperkalemia.  improving. Continue on tamsulosin for history of urine retention. Repeat laboratory studies ordered to monitor.    Coronary artery disease - No chest pain. serial troponin elevated but flat in the setting of acute kidney injury and heart failure. On aspirin and atorvastatin.    Diabetes - Insulin coverage, close monitoring of blood glucose levels. Gabapentin for neuropathy.    improved FS on lantus.    GERD / History of GI bleed - On pantoprazole. Diet as tolerated.    right pleural effusion: no respiratory compromise. repeat CXR today per cardiology    anemia of chronic disease: iron panel.     asymptomatic bacteriuria: monitor. poor sample as epithelial cells noted     PT evaluation

## 2019-05-19 NOTE — PROGRESS NOTE ADULT - SUBJECTIVE AND OBJECTIVE BOX
[facility]  [address], [place] [address]    REASON FOR VISIT:   Follow up on chf  UPDATE:   States she feels better    TELEMETRY MONITORING:  NSR  no arrhythmias    CARDIAC MARKERS ( 18 May 2019 06:26 )  x     / 0.10 ng/mL / x     / x     / x      CARDIAC MARKERS ( 17 May 2019 19:28 )  x     / 0.12 ng/mL / x     / x     / x      CARDIAC MARKERS ( 17 May 2019 12:21 )  x     / 0.10 ng/mL / x     / x     / x          05-19    135  |  98  |  49.0<H>  ----------------------------<  168<H>  4.2   |  26.0  |  1.56<H>    Ca    8.5<L>      19 May 2019 06:48    TPro  6.6  /  Alb  3.1<L>  /  TBili  0.9  /  DBili  x   /  AST  58<H>  /  ALT  99<H>  /  AlkPhos  190<H>  05-19    LIVER FUNCTIONS - ( 19 May 2019 06:48 )  Alb: 3.1 g/dL / Pro: 6.6 g/dL / ALK PHOS: 190 U/L / ALT: 99 U/L / AST: 58 U/L / GGT: x             05-18-19 @ 07:01  -  05-19-19 @ 07:00  --------------------------------------------------------  IN: 200 mL / O: 800 mL / NET: -600 mL  Output last 12 hours 850      MEDICATIONS  (STANDING):  aspirin enteric coated 81 milliGRAM(s) Oral daily  atorvastatin 80 milliGRAM(s) Oral at bedtime  carvedilol 6.25 milliGRAM(s) Oral every 12 hours  DULoxetine 60 milliGRAM(s) Oral daily  furosemide   Injectable 40 milliGRAM(s) IV Push two times a day  gabapentin 100 milliGRAM(s) Oral three times a day  insulin glargine Injectable (LANTUS) 15 Unit(s) SubCutaneous at bedtime  insulin lispro (HumaLOG) corrective regimen sliding scale   SubCutaneous three times a day before meals  isosorbide   mononitrate ER Tablet (IMDUR) 60 milliGRAM(s) Oral daily  melatonin 3 milliGRAM(s) Oral at bedtime  pantoprazole    Tablet 40 milliGRAM(s) Oral before breakfast  tamsulosin 0.4 milliGRAM(s) Oral at bedtime    Vital Signs Last 24 Hrs  T(C): 36.8 (19 May 2019 06:34), Max: 36.8 (19 May 2019 06:34)  T(F): 98.2 (19 May 2019 06:34), Max: 98.2 (19 May 2019 06:34)  HR: 69 (19 May 2019 06:34) (67 - 72)  BP: 125/56 (19 May 2019 06:34) (114/61 - 144/71)  BP(mean): --  RR: 18 (19 May 2019 06:34) (17 - 18)  SpO2: 94% (19 May 2019 06:34) (94% - 98%)  T(C): 36.8 (05-19-19 @ 06:34), Max: 36.8 (05-19-19 @ 06:34)  HR: 69 (05-19-19 @ 06:34) (67 - 72)  BP: 125/56 (05-19-19 @ 06:34) (114/61 - 144/71)  RR: 18 (05-19-19 @ 06:34) (17 - 18)  SpO2: 94% (05-19-19 @ 06:34) (94% - 98%)    HEENT Head atraumatic eomi, oral mucosa moist  CV S1&S2 decreased       RESP  decreased bs bilaterally  GI  Soft active bowel sounds non tender  EXT  No clubbing/Cyanosis / trace edema  NEURO  Alert oriented  No gross motor or sensory deficits

## 2019-05-19 NOTE — PROGRESS NOTE ADULT - SUBJECTIVE AND OBJECTIVE BOX
seen for fluid overload    no acute complaints/events  denies urinary complaints.  ros otherwise negative     MEDICATIONS  (STANDING):  aspirin enteric coated 81 milliGRAM(s) Oral daily  atorvastatin 80 milliGRAM(s) Oral at bedtime  carvedilol 6.25 milliGRAM(s) Oral every 12 hours  dextrose 5%. 1000 milliLiter(s) (50 mL/Hr) IV Continuous <Continuous>  dextrose 50% Injectable 12.5 Gram(s) IV Push once  dextrose 50% Injectable 25 Gram(s) IV Push once  dextrose 50% Injectable 25 Gram(s) IV Push once  DULoxetine 60 milliGRAM(s) Oral daily  furosemide   Injectable 40 milliGRAM(s) IV Push two times a day  gabapentin 100 milliGRAM(s) Oral three times a day  insulin glargine Injectable (LANTUS) 15 Unit(s) SubCutaneous at bedtime  insulin lispro (HumaLOG) corrective regimen sliding scale   SubCutaneous three times a day before meals  isosorbide   mononitrate ER Tablet (IMDUR) 60 milliGRAM(s) Oral daily  melatonin 3 milliGRAM(s) Oral at bedtime  nystatin Powder 1 Application(s) Topical two times a day  pantoprazole    Tablet 40 milliGRAM(s) Oral before breakfast  tamsulosin 0.4 milliGRAM(s) Oral at bedtime    MEDICATIONS  (PRN):  dextrose 40% Gel 15 Gram(s) Oral once PRN Blood Glucose LESS THAN 70 milliGRAM(s)/deciliter  glucagon  Injectable 1 milliGRAM(s) IntraMuscular once PRN Glucose LESS THAN 70 milligrams/deciliter      Allergies    Allergy Status Unknown      Vital Signs Last 24 Hrs  T(C): 36.8 (19 May 2019 06:34), Max: 36.8 (19 May 2019 06:34)  T(F): 98.2 (19 May 2019 06:34), Max: 98.2 (19 May 2019 06:34)  HR: 69 (19 May 2019 06:34) (67 - 72)  BP: 125/56 (19 May 2019 06:34) (114/61 - 144/71)  BP(mean): --  RR: 18 (19 May 2019 06:34) (17 - 18)  SpO2: 94% (19 May 2019 06:34) (94% - 98%)    PHYSICAL EXAM:    GENERAL: NAD  CHEST/LUNG: dec bs at bases R>L  HEART: Regular rate and rhythm; S1 S2  ABDOMEN: Soft, Bowel sounds present  EXTREMITIES:  trace/+1 edema b/l   NERVOUS SYSTEM:  Alert & Oriented X3, nonfocal    LABS:                        8.4    6.6   )-----------( 188      ( 19 May 2019 06:48 )             23.5         135  |  98  |  49.0<H>  ----------------------------<  168<H>  4.2   |  26.0  |  1.56<H>    Ca    8.5<L>      19 May 2019 06:48    TPro  6.6  /  Alb  3.1<L>  /  TBili  0.9  /  DBili  x   /  AST  58<H>  /  ALT  99<H>  /  AlkPhos  190<H>        Urinalysis Basic - ( 17 May 2019 12:37 )    Color: Yellow / Appearance: Clear / S.020 / pH: x  Gluc: x / Ketone: Trace  / Bili: Negative / Urobili: 4 mg/dL   Blood: x / Protein: 100 mg/dL / Nitrite: Negative   Leuk Esterase: Moderate / RBC: 6-10 /HPF / WBC 6-10   Sq Epi: x / Non Sq Epi: Occasional / Bacteria: Occasional        CAPILLARY BLOOD GLUCOSE      POCT Blood Glucose.: 171 mg/dL (19 May 2019 08:37)  POCT Blood Glucose.: 226 mg/dL (18 May 2019 21:44)  POCT Blood Glucose.: 225 mg/dL (18 May 2019 17:42)        RADIOLOGY & ADDITIONAL TESTS:

## 2019-05-19 NOTE — PROGRESS NOTE ADULT - PROBLEM SELECTOR PLAN 3
- Patient's BP low normal at this time  - Hold Losartan for KEITH  - Check orthostatics due to complaints of dizziness  - Continue to monitor
Iron sat low  consider iron

## 2019-05-19 NOTE — PROGRESS NOTE ADULT - ASSESSMENT
65 yo F presented with hx of CAD, s/p CABG x1 (lima->LAD 5/2015), MADHAVI x2-> LCX & OM1 (8/2015), nuclear stress 11/2018 with mildly positive ischemia in RCA (medically managed), cardiac arrest due to hypercapnea, HFpEF (11/2018 EF 65-70%), moderate RV dysfunction and severe pulmonary HTN, PAD s/p balloon angioplasty, DM2, HTN, and HLD who presents with worsening dyspnea on exertion. She recently was discharged from a 2-month hospitalization as she was being treated for ADHF, KEITH, , GIB, and right pleural effusion s/p thoracentesis. 4 yo F presented with hx of CAD, s/p CABG x1 (lima->LAD 5/2015), MADHAVI x2-> LCX & OM1 (8/2015), nuclear stress 11/2018 with mildly positive ischemia in RCA (medically managed), cardiac arrest due to hypercapnea, HFpEF (11/2018 EF 65-70%), moderate RV dysfunction and severe pulmonary HTN, PAD s/p balloon angioplasty, DM2, HTN, and HLD who presents with worsening dyspnea on exertion. She recently was discharged from a 2-month hospitalization as she was being treated for ADHF, KEITH, , GIB, and right pleural effusion s/p thoracentesis. The patient reports that her shortness of breath started a few days ago and was associated with significant fatigue. As per the emergency department notes, the patient was noted to be "slow to respond" two days prior. The patient denied any associated chest pain or palpitations but admits to increased lower extremity swelling. She denied any orthopnea or increased urination. The patient denied any recent changes in her diet or medications and reports being compliant with her medications. She is extremely lethargic and arousable to tactile stimuli, falls asleep during conversation.  No appearance of increased wob, maintaining spO2 >94% via NC.

## 2019-05-20 LAB
ANION GAP SERPL CALC-SCNC: 8 MMOL/L — SIGNIFICANT CHANGE UP (ref 5–17)
BUN SERPL-MCNC: 39 MG/DL — HIGH (ref 8–20)
CALCIUM SERPL-MCNC: 9.1 MG/DL — SIGNIFICANT CHANGE UP (ref 8.6–10.2)
CHLORIDE SERPL-SCNC: 101 MMOL/L — SIGNIFICANT CHANGE UP (ref 98–107)
CO2 SERPL-SCNC: 31 MMOL/L — HIGH (ref 22–29)
CREAT SERPL-MCNC: 1.12 MG/DL — SIGNIFICANT CHANGE UP (ref 0.5–1.3)
GLUCOSE BLDC GLUCOMTR-MCNC: 104 MG/DL — HIGH (ref 70–99)
GLUCOSE BLDC GLUCOMTR-MCNC: 148 MG/DL — HIGH (ref 70–99)
GLUCOSE BLDC GLUCOMTR-MCNC: 174 MG/DL — HIGH (ref 70–99)
GLUCOSE BLDC GLUCOMTR-MCNC: 218 MG/DL — HIGH (ref 70–99)
GLUCOSE SERPL-MCNC: 152 MG/DL — HIGH (ref 70–115)
MAGNESIUM SERPL-MCNC: 2 MG/DL — SIGNIFICANT CHANGE UP (ref 1.6–2.6)
POTASSIUM SERPL-MCNC: 4.1 MMOL/L — SIGNIFICANT CHANGE UP (ref 3.5–5.3)
POTASSIUM SERPL-SCNC: 4.1 MMOL/L — SIGNIFICANT CHANGE UP (ref 3.5–5.3)
SODIUM SERPL-SCNC: 140 MMOL/L — SIGNIFICANT CHANGE UP (ref 135–145)

## 2019-05-20 PROCEDURE — 99233 SBSQ HOSP IP/OBS HIGH 50: CPT

## 2019-05-20 PROCEDURE — 99232 SBSQ HOSP IP/OBS MODERATE 35: CPT

## 2019-05-20 PROCEDURE — 99223 1ST HOSP IP/OBS HIGH 75: CPT

## 2019-05-20 RX ORDER — LOSARTAN POTASSIUM 100 MG/1
50 TABLET, FILM COATED ORAL DAILY
Refills: 0 | Status: DISCONTINUED | OUTPATIENT
Start: 2019-05-20 | End: 2019-05-21

## 2019-05-20 RX ORDER — FERROUS SULFATE 325(65) MG
325 TABLET ORAL DAILY
Refills: 0 | Status: DISCONTINUED | OUTPATIENT
Start: 2019-05-20 | End: 2019-05-23

## 2019-05-20 RX ORDER — SODIUM CHLORIDE 0.65 %
1 AEROSOL, SPRAY (ML) NASAL
Refills: 0 | Status: DISCONTINUED | OUTPATIENT
Start: 2019-05-20 | End: 2019-05-23

## 2019-05-20 RX ADMIN — INSULIN GLARGINE 15 UNIT(S): 100 INJECTION, SOLUTION SUBCUTANEOUS at 22:18

## 2019-05-20 RX ADMIN — Medication 4: at 17:39

## 2019-05-20 RX ADMIN — NYSTATIN CREAM 1 APPLICATION(S): 100000 CREAM TOPICAL at 17:40

## 2019-05-20 RX ADMIN — CARVEDILOL PHOSPHATE 6.25 MILLIGRAM(S): 80 CAPSULE, EXTENDED RELEASE ORAL at 17:39

## 2019-05-20 RX ADMIN — GABAPENTIN 100 MILLIGRAM(S): 400 CAPSULE ORAL at 05:37

## 2019-05-20 RX ADMIN — CARVEDILOL PHOSPHATE 6.25 MILLIGRAM(S): 80 CAPSULE, EXTENDED RELEASE ORAL at 05:37

## 2019-05-20 RX ADMIN — DULOXETINE HYDROCHLORIDE 60 MILLIGRAM(S): 30 CAPSULE, DELAYED RELEASE ORAL at 12:27

## 2019-05-20 RX ADMIN — GABAPENTIN 100 MILLIGRAM(S): 400 CAPSULE ORAL at 14:11

## 2019-05-20 RX ADMIN — NYSTATIN CREAM 1 APPLICATION(S): 100000 CREAM TOPICAL at 05:37

## 2019-05-20 RX ADMIN — GABAPENTIN 100 MILLIGRAM(S): 400 CAPSULE ORAL at 22:12

## 2019-05-20 RX ADMIN — Medication 3 MILLIGRAM(S): at 22:12

## 2019-05-20 RX ADMIN — Medication 81 MILLIGRAM(S): at 12:27

## 2019-05-20 RX ADMIN — PANTOPRAZOLE SODIUM 40 MILLIGRAM(S): 20 TABLET, DELAYED RELEASE ORAL at 05:38

## 2019-05-20 RX ADMIN — ATORVASTATIN CALCIUM 80 MILLIGRAM(S): 80 TABLET, FILM COATED ORAL at 22:12

## 2019-05-20 RX ADMIN — Medication 325 MILLIGRAM(S): at 12:28

## 2019-05-20 RX ADMIN — Medication 40 MILLIGRAM(S): at 05:37

## 2019-05-20 RX ADMIN — Medication 40 MILLIGRAM(S): at 17:39

## 2019-05-20 RX ADMIN — TAMSULOSIN HYDROCHLORIDE 0.4 MILLIGRAM(S): 0.4 CAPSULE ORAL at 22:12

## 2019-05-20 RX ADMIN — ISOSORBIDE MONONITRATE 60 MILLIGRAM(S): 60 TABLET, EXTENDED RELEASE ORAL at 12:27

## 2019-05-20 NOTE — CONSULT NOTE ADULT - ATTENDING COMMENTS
Pt seen and examined.  Known to service.  Now rt pleural effusion.  No significant complaints of SOB.  Will hold on drainage for now.

## 2019-05-20 NOTE — PROGRESS NOTE ADULT - SUBJECTIVE AND OBJECTIVE BOX
seen for heart failure  no acute complains/events  comfortable.  no sob.  ros otherwise negative     MEDICATIONS  (STANDING):  aspirin enteric coated 81 milliGRAM(s) Oral daily  atorvastatin 80 milliGRAM(s) Oral at bedtime  carvedilol 6.25 milliGRAM(s) Oral every 12 hours  dextrose 5%. 1000 milliLiter(s) (50 mL/Hr) IV Continuous <Continuous>  dextrose 50% Injectable 12.5 Gram(s) IV Push once  dextrose 50% Injectable 25 Gram(s) IV Push once  dextrose 50% Injectable 25 Gram(s) IV Push once  DULoxetine 60 milliGRAM(s) Oral daily  furosemide   Injectable 40 milliGRAM(s) IV Push two times a day  gabapentin 100 milliGRAM(s) Oral three times a day  insulin glargine Injectable (LANTUS) 15 Unit(s) SubCutaneous at bedtime  insulin lispro (HumaLOG) corrective regimen sliding scale   SubCutaneous three times a day before meals  isosorbide   mononitrate ER Tablet (IMDUR) 60 milliGRAM(s) Oral daily  melatonin 3 milliGRAM(s) Oral at bedtime  nystatin Powder 1 Application(s) Topical two times a day  pantoprazole    Tablet 40 milliGRAM(s) Oral before breakfast  tamsulosin 0.4 milliGRAM(s) Oral at bedtime    MEDICATIONS  (PRN):  dextrose 40% Gel 15 Gram(s) Oral once PRN Blood Glucose LESS THAN 70 milliGRAM(s)/deciliter  glucagon  Injectable 1 milliGRAM(s) IntraMuscular once PRN Glucose LESS THAN 70 milligrams/deciliter  sodium chloride 0.65% Nasal 1 Spray(s) Both Nostrils four times a day PRN Nasal Congestion or dry nares      Allergies    Allergy Status Unknown    Vital Signs Last 24 Hrs  T(C): 36.7 (20 May 2019 05:50), Max: 36.7 (19 May 2019 15:26)  T(F): 98 (20 May 2019 05:50), Max: 98.1 (20 May 2019 05:33)  HR: 72 (20 May 2019 05:50) (71 - 74)  BP: 128/64 (20 May 2019 05:50) (128/64 - 130/65)  BP(mean): --  RR: 18 (20 May 2019 05:50) (18 - 18)  SpO2: 98% (20 May 2019 05:50) (97% - 100%)    PHYSICAL EXAM:    GENERAL: NAD  CHEST/LUNG: dec bs right base   HEART: Regular rate and rhythm; S1 S2  ABDOMEN: Soft, distended; Bowel sounds present  EXTREMITIES:  +edema   NERVOUS SYSTEM:  Alert & Oriented X3 nonfocal.    LABS:                        8.4    6.6   )-----------( 188      ( 19 May 2019 06:48 )             23.5     05-19    135  |  98  |  49.0<H>  ----------------------------<  168<H>  4.2   |  26.0  |  1.56<H>    Ca    8.5<L>      19 May 2019 06:48    TPro  6.6  /  Alb  3.1<L>  /  TBili  0.9  /  DBili  x   /  AST  58<H>  /  ALT  99<H>  /  AlkPhos  190<H>  05-19          CAPILLARY BLOOD GLUCOSE      POCT Blood Glucose.: 104 mg/dL (20 May 2019 08:34)  POCT Blood Glucose.: 230 mg/dL (19 May 2019 21:39)  POCT Blood Glucose.: 258 mg/dL (19 May 2019 17:26)  POCT Blood Glucose.: 177 mg/dL (19 May 2019 12:15)        RADIOLOGY & ADDITIONAL TESTS:

## 2019-05-20 NOTE — PROGRESS NOTE ADULT - ATTENDING COMMENTS
discussed with patient al length. recurrent CHF admissions. Will plan for RHC and cardiomems on wednesday for closer monitoring and to decrease CHF hospitalizations.
PT feeling well today, without complaints. BP has improved, and K+ stable.   CXR with RLL opacity- I suspect may be elevated hemidiaphragm rather than effusion. suggest noncontract Chest CT to evaluate.   can continue IV lasix, with transition to po.
pt feeling well today, denies SOB or other complaints. reports she was dizzy, now improving.   continue IV diuresis for now.   Was hyperkalemic, improving. monitor lytes bid.  likely large right pleural effusion. per primary team will defer for now given lack of symptoms.  monitor BP off ARB for now.

## 2019-05-20 NOTE — PROGRESS NOTE ADULT - SUBJECTIVE AND OBJECTIVE BOX
SUBJECTIVE:  Cardiology NP F/U note:  CHF/ ex SOB/ OOB chair tolerating diet/ ambulating with PT  denies complaints of chest pain/sob/dizziness/palps  claims breathing is improved      	  MEDICATIONS  (STANDING):  aspirin enteric coated 81 milliGRAM(s) Oral daily  atorvastatin 80 milliGRAM(s) Oral at bedtime  carvedilol 6.25 milliGRAM(s) Oral every 12 hours  dextrose 5%. 1000 milliLiter(s) IV Continuous <Continuous>  dextrose 50% Injectable 12.5 Gram(s) IV Push once  dextrose 50% Injectable 25 Gram(s) IV Push once  dextrose 50% Injectable 25 Gram(s) IV Push once  DULoxetine 60 milliGRAM(s) Oral daily  ferrous    sulfate 325 milliGRAM(s) Oral daily  furosemide   Injectable 40 milliGRAM(s) IV Push two times a day  gabapentin 100 milliGRAM(s) Oral three times a day  insulin glargine Injectable (LANTUS) 15 Unit(s) SubCutaneous at bedtime  insulin lispro (HumaLOG) corrective regimen sliding scale   SubCutaneous three times a day before meals  isosorbide   mononitrate ER Tablet (IMDUR) 60 milliGRAM(s) Oral daily  melatonin 3 milliGRAM(s) Oral at bedtime  nystatin Powder 1 Application(s) Topical two times a day  pantoprazole    Tablet 40 milliGRAM(s) Oral before breakfast  tamsulosin 0.4 milliGRAM(s) Oral at bedtime    MEDICATIONS  (PRN):  dextrose 40% Gel 15 Gram(s) Oral once PRN Blood Glucose LESS THAN 70 milliGRAM(s)/deciliter  glucagon  Injectable 1 milliGRAM(s) IntraMuscular once PRN Glucose LESS THAN 70 milligrams/deciliter  sodium chloride 0.65% Nasal 1 Spray(s) Both Nostrils four times a day PRN Nasal Congestion or dry nares      PHYSICAL EXAM:    T(C): 37.1 (19 @ 10:14), Max: 37.1 (19 @ 10:14)  HR: 70 (19 @ 10:14) (70 - 74)  BP: 113/59 (19 @ 10:14) (113/59 - 130/65)  RR: 18 (19 @ 10:14) (18 - 18)  SpO2: 100% (19 @ 10:14) (97% - 100%)  Wt(kg): --    I&O's Summary    19 May 2019 07:01  -  20 May 2019 07:00  --------------------------------------------------------  IN: 660 mL / OUT: 0 mL / NET: 660 mL        Daily     Daily Weight in k.2 (20 May 2019 05:19)    Appearance: Normal	  Cardiovascular: Normal S1 S2, RRR 72  No JVD, 3/6 BENTON LSB to the neck  Respiratory: Lungs clear to auscultation	  Psychiatry: A & O x 3, Mood & affect appropriate  Gastrointestinal:  Soft, Non-tender, + BS	  Skin: warm and dry  Neurologic: Non-focal  Extremities: Normal range of motion,: 1-2+ edema bilat  Vascular: Peripheral pulses palpable 2+ bilaterally    TELEMETRY: 	RSR 70's no arrthymias overnight.    	  RADIOLOGY:   DIAGNOSTIC TESTING:  [ XEchocardiogram:  [ ]  Catheterization:  	  CT CHEST:  < from: CT Chest No Cont (19 @ 17:07) >  IMPRESSION:     Moderate loculated right pleural effusion and trace left pleural effusion.    Faint groundglass opacities compatible mild pulmonary vascular congestion.    Questionable heterogeneity at the dome of the liver, although evaluation   is limited due to streak artifact. Small upper abdominal ascites.   Consider further evaluation with nonemergent CT of the abdomen pelvis.                                    8.4    6.6   )-----------( 188      ( 19 May 2019 06:48 )             23.5         140  |  101  |  39.0<H>  ----------------------------<  152<H>  4.1   |  31.0<H>  |  1.12    Ca    9.1      20 May 2019 12:13  Mg     2.0         TPro  6.6  /  Alb  3.1<L>  /  TBili  0.9  /  DBili  x   /  AST  58<H>  /  ALT  99<H>  /  AlkPhos  190<H>       ASSESSMENT:  64 F admitted for worsening exertional dyspnea/ fatigue/low ext edema for 2 days PTA/  Acute on chronic diastolic CHF/ R pleural effusion   HX: CAD /CABG (lima->LAD 2015), MADHAVI x2-> LCX & OM1 (2015), nuclear stress 2018 with mildly positive ischemia in RCA (medically managed), cardiac arrest due to hypercapnea, HFpEF (2018 EF 65-70%), moderate RV dysfunction and severe pulmonary HTN, PAD s/p balloon angioplasty, DM2, HTN, and HLD GIB and recent R thoracentesis     Dyspnea is improved / CT shows MOD Right loculated effusion / ultrasound performed shows small R loculated effusion   CT surgery eval noted and discussed/  no indication to drain at this time      Plan:  Continue current meds and management ASA/ Coreg /Statin  continue IV diuresis consider PO in am   continue to increase activity   d/w Dr. Recio SUBJECTIVE:  Cardiology NP F/U note:  CHF/ ex SOB/ OOB chair tolerating diet/ ambulating with PT  denies complaints of chest pain/sob/dizziness/palps  claims breathing is improved      	  MEDICATIONS  (STANDING):  aspirin enteric coated 81 milliGRAM(s) Oral daily  atorvastatin 80 milliGRAM(s) Oral at bedtime  carvedilol 6.25 milliGRAM(s) Oral every 12 hours  dextrose 5%. 1000 milliLiter(s) IV Continuous <Continuous>  dextrose 50% Injectable 12.5 Gram(s) IV Push once  dextrose 50% Injectable 25 Gram(s) IV Push once  dextrose 50% Injectable 25 Gram(s) IV Push once  DULoxetine 60 milliGRAM(s) Oral daily  ferrous    sulfate 325 milliGRAM(s) Oral daily  furosemide   Injectable 40 milliGRAM(s) IV Push two times a day  gabapentin 100 milliGRAM(s) Oral three times a day  insulin glargine Injectable (LANTUS) 15 Unit(s) SubCutaneous at bedtime  insulin lispro (HumaLOG) corrective regimen sliding scale   SubCutaneous three times a day before meals  isosorbide   mononitrate ER Tablet (IMDUR) 60 milliGRAM(s) Oral daily  melatonin 3 milliGRAM(s) Oral at bedtime  nystatin Powder 1 Application(s) Topical two times a day  pantoprazole    Tablet 40 milliGRAM(s) Oral before breakfast  tamsulosin 0.4 milliGRAM(s) Oral at bedtime    MEDICATIONS  (PRN):  dextrose 40% Gel 15 Gram(s) Oral once PRN Blood Glucose LESS THAN 70 milliGRAM(s)/deciliter  glucagon  Injectable 1 milliGRAM(s) IntraMuscular once PRN Glucose LESS THAN 70 milligrams/deciliter  sodium chloride 0.65% Nasal 1 Spray(s) Both Nostrils four times a day PRN Nasal Congestion or dry nares      PHYSICAL EXAM:    T(C): 37.1 (19 @ 10:14), Max: 37.1 (19 @ 10:14)  HR: 70 (19 @ 10:14) (70 - 74)  BP: 113/59 (19 @ 10:14) (113/59 - 130/65)  RR: 18 (19 @ 10:14) (18 - 18)  SpO2: 100% (19 @ 10:14) (97% - 100%)  Wt(kg): --    I&O's Summary    19 May 2019 07:01  -  20 May 2019 07:00  --------------------------------------------------------  IN: 660 mL / OUT: 0 mL / NET: 660 mL        Daily     Daily Weight in k.2 (20 May 2019 05:19)    Appearance: Normal	  Cardiovascular: Normal S1 S2, RRR 72  No JVD, 3/6 B[place]ON LSB to the neck  Respiratory: Lungs clear to auscultation	  Psychiatry: A & O x 3, Mood & affect appropriate  Gastrointestinal:  Soft, Non-tender, + BS	  Skin: warm and dry  Neurologic: Non-focal  Extremities: Normal range of motion,: 1-2+ edema bilat  Vascular: Peripheral pulses palpable 2+ bilaterally    TELEMETRY: 	RSR 70's no arrthymias overnight.    	  RADIOLOGY:   DIAGNOSTIC TESTING:  [ XEchocardiogram:  [ ]  Catheterization:  	  CT CHEST:  < from: CT Chest No Cont (19 @ 17:07) >  IMPRESSION:     Moderate loculated right pleural effusion and trace left pleural effusion.    Faint groundglass opacities compatible mild pulmonary vascular congestion.    Questionable heterogeneity at the dome of the liver, although evaluation   is limited due to streak artifact. Small upper abdominal ascites.   Consider further evaluation with nonemergent CT of the abdomen pelvis.                                    8.4    6.6   )-----------( 188      ( 19 May 2019 06:48 )             23.5         140  |  101  |  39.0<H>  ----------------------------<  152<H>  4.1   |  31.0<H>  |  1.12    Ca    9.1      20 May 2019 12:13  Mg     2.0         TPro  6.6  /  Alb  3.1<L>  /  TBili  0.9  /  DBili  x   /  AST  58<H>  /  ALT  99<H>  /  AlkPhos  190<H>       ASSESSMENT:  64 F admitted for worsening exertional dyspnea/ fatigue/low ext edema for 2 days PTA/  Acute on chronic diastolic CHF/ R pleural effusion   HX: CAD /CABG (lima->LAD 2015), MADHAVI x2-> LCX & OM1 (2015), nuclear stress 2018 with mildly positive ischemia in RCA (medically managed), cardiac arrest due to hypercapnea, HFpEF (2018 EF 65-70%), moderate RV dysfunction and severe pulmonary HTN, PAD s/p balloon angioplasty, DM2, HTN, and HLD GIB and recent R thoracentesis     Dyspnea is improved / CT shows MOD Right loculated effusion / ultrasound performed shows small R loculated effusion   CT surgery eval noted and discussed/  no indication to drain at this time   Renal fx improved      Plan:  Continue current meds and management ASA/ Coreg /Statin  continue IV diuresis consider PO in am/ resume ARB /    continue to increase activity   d/w Dr. Recio SUBJECTIVE:  Cardiology NP F/U note:  CHF/ ex SOB/ OOB chair tolerating diet/ ambulating with PT  denies complaints of chest pain/sob/dizziness/palps  claims breathing is improved      	  MEDICATIONS  (STANDING):  aspirin enteric coated 81 milliGRAM(s) Oral daily  atorvastatin 80 milliGRAM(s) Oral at bedtime  carvedilol 6.25 milliGRAM(s) Oral every 12 hours  dextrose 5%. 1000 milliLiter(s) IV Continuous <Continuous>  dextrose 50% Injectable 12.5 Gram(s) IV Push once  dextrose 50% Injectable 25 Gram(s) IV Push once  dextrose 50% Injectable 25 Gram(s) IV Push once  DULoxetine 60 milliGRAM(s) Oral daily  ferrous    sulfate 325 milliGRAM(s) Oral daily  furosemide   Injectable 40 milliGRAM(s) IV Push two times a day  gabapentin 100 milliGRAM(s) Oral three times a day  insulin glargine Injectable (LANTUS) 15 Unit(s) SubCutaneous at bedtime  insulin lispro (HumaLOG) corrective regimen sliding scale   SubCutaneous three times a day before meals  isosorbide   mononitrate ER Tablet (IMDUR) 60 milliGRAM(s) Oral daily  melatonin 3 milliGRAM(s) Oral at bedtime  nystatin Powder 1 Application(s) Topical two times a day  pantoprazole    Tablet 40 milliGRAM(s) Oral before breakfast  tamsulosin 0.4 milliGRAM(s) Oral at bedtime    MEDICATIONS  (PRN):  dextrose 40% Gel 15 Gram(s) Oral once PRN Blood Glucose LESS THAN 70 milliGRAM(s)/deciliter  glucagon  Injectable 1 milliGRAM(s) IntraMuscular once PRN Glucose LESS THAN 70 milligrams/deciliter  sodium chloride 0.65% Nasal 1 Spray(s) Both Nostrils four times a day PRN Nasal Congestion or dry nares      PHYSICAL EXAM:    T(C): 37.1 (19 @ 10:14), Max: 37.1 (19 @ 10:14)  HR: 70 (19 @ 10:14) (70 - 74)  BP: 113/59 (19 @ 10:14) (113/59 - 130/65)  RR: 18 (19 @ 10:14) (18 - 18)  SpO2: 100% (19 @ 10:14) (97% - 100%)  Wt(kg): --    I&O's Summary    19 May 2019 07:01  -  20 May 2019 07:00  --------------------------------------------------------  IN: 660 mL / OUT: 0 mL / NET: 660 mL        Daily     Daily Weight in k.2 (20 May 2019 05:19)    Appearance: Normal	  Cardiovascular: Normal S1 S2, RRR 72  No JVD, 3/6 BENTON LSB to the neck  Respiratory: Lungs clear to auscultation	  Psychiatry: A & O x 3, Mood & affect appropriate  Gastrointestinal:  Soft, Non-tender, + BS	  Skin: warm and dry  Neurologic: Non-focal  Extremities: Normal range of motion,: 1-2+ edema bilat  Vascular: Peripheral pulses palpable 2+ bilaterally    TELEMETRY: 	RSR 70's no arrthymias overnight.    	  RADIOLOGY:   DIAGNOSTIC TESTING:  [ XEchocardiogram:  [ ]  Catheterization:  	  CT CHEST:  < from: CT Chest No Cont (19 @ 17:07) >  IMPRESSION:     Moderate loculated right pleural effusion and trace left pleural effusion.    Faint groundglass opacities compatible mild pulmonary vascular congestion.    Questionable heterogeneity at the dome of the liver, although evaluation   is limited due to streak artifact. Small upper abdominal ascites.   Consider further evaluation with nonemergent CT of the abdomen pelvis.                                    8.4    6.6   )-----------( 188      ( 19 May 2019 06:48 )             23.5         140  |  101  |  39.0<H>  ----------------------------<  152<H>  4.1   |  31.0<H>  |  1.12    Ca    9.1      20 May 2019 12:13  Mg     2.0         TPro  6.6  /  Alb  3.1<L>  /  TBili  0.9  /  DBili  x   /  AST  58<H>  /  ALT  99<H>  /  AlkPhos  190<H>       ASSESSMENT:  64 F admitted for worsening exertional dyspnea/ fatigue/low ext edema for 2 days PTA/  Acute on chronic diastolic CHF/ R pleural effusion   HX: CAD /CABG (lima->LAD 2015), MADHAVI x2-> LCX & OM1 (2015), nuclear stress 2018 with mildly positive ischemia in RCA (medically managed), cardiac arrest due to hypercapnea, HFpEF (2018 EF 65-70%), moderate RV dysfunction and severe pulmonary HTN, PAD s/p balloon angioplasty, DM2, HTN, and HLD GIB and recent R thoracentesis     Dyspnea is improved / CT shows MOD Right loculated effusion / ultrasound performed shows small R loculated effusion   CT surgery eval noted and discussed/  no indication to drain at this time   Renal fx improved      Plan:  Continue current meds and management ASA/ Coreg /Statin  continue IV diuresis consider PO in am/ resume ARB /  will D/C home on Toresemide at prior to admission   continue to increase activity as tolerated   d/w Dr. Recio will plan for Cardiomems on Wednesday

## 2019-05-20 NOTE — PHYSICAL THERAPY INITIAL EVALUATION ADULT - PERTINENT HX OF CURRENT PROBLEM, REHAB EVAL
64F with a history of CABG, Diastolic HF, diabetes, GERD with prior GI bleed, peripheral vascular disease and neuropathy presented with increased dyspnea and fatigue. She was found to have hypoxia (88% on ambient air), lower extremity edema, and chest X Ray consistent with pulmonary edema. Laboratory studies also noted acute kidney injury and hyperkalemia.

## 2019-05-20 NOTE — PROGRESS NOTE ADULT - ASSESSMENT
64F with a history of CABG, Diastolic HF, diabetes, GERD with prior GI bleed, peripheral vascular disease and neuropathy presented with increased dyspnea and fatigue. She was found to have hypoxia (88% on ambient air), lower extremity edema, and chest Xray consistent with pulmonary edema. Laboratory studies also noted acute kidney injury and hyperkalemia.    Acute on chronic diastolic heart failure - Prior echocardiogram(under different chart MR#[id number]) noted preserved ventricular function and moderate pulmonary hypertension. Intravenous furosemide initiated for diuresis. On carvedilol and isosorbide. Losartan to be discontinued due to acute kidney injury and hyperkalemia. Cardiology following    Acute kidney injury / Hyperkalemia - Losartan held on admission. Kayexalate for hyperkalemia.  improving. Continue on tamsulosin for history of urine retention. Repeat laboratory studies ordered to monitor.    Coronary artery disease - No chest pain. serial troponin elevated but flat in the setting of acute kidney injury and heart failure. On aspirin and atorvastatin.    Diabetes - Insulin coverage, close monitoring of blood glucose levels. Gabapentin for neuropathy.    improved FS on lantus.    GERD / History of GI bleed - On pantoprazole. Diet as tolerated.    right pleural effusion: no respiratory compromise. CT chest with Moderate loculated right pleural effusion and trace left pleural effusion.    d/w CT surgery likely no intervention     mild iron def anemia: start ferrous sulfate     asymptomatic bacteriuria: monitor. poor sample as epithelial cells noted     PT evaluation    dc planning?

## 2019-05-20 NOTE — CONSULT NOTE ADULT - SUBJECTIVE AND OBJECTIVE BOX
HPI:  64F presented with increasing dyspnea. The patient reported that the shortness of breath started day prior to admission and was associated with significant fatigue. As per the emergency department notes, the patient was noted to be "slow to respond" two days prior. The patient denied any associated chest pain or palpitations but admits to increased lower extremity swelling. She denied any orthopnea or increased urination. The patient denied any recent changes in her diet or medications and reports being compliant with her medications. She reports similar symptoms in the past for which she was admitted to the hospital a few months prior. She is unaware of any aggravating or relieving factors. She denied any associated fevers or chills. There was no audible wheezing. (17 May 2019 15:52)      PAST MEDICAL & SURGICAL HISTORY:  Congestive heart failure, unspecified HF chronicity, unspecified heart failure type  Insomnia  Retinal detachment  CAD (coronary artery disease)  Glaucoma  Urinary retention  Peripheral neuropathy  GERD (gastroesophageal reflux disease)  Depression  Diabetes  Hypertension, unspecified type  No significant past surgical history      REVIEW OF SYSTEMS      General:No Weight change/+  Fatigue/ HA/Dizzy	    Skin/Breast: No Rashes/ Lesions/ Masses  	  Ophthalmologic: R eye blindness d/t retinal detachment x 10 years  	  ENMT: No Hearing loss/ Drainage/ Lesions	    Respiratory and Thorax: No Cough/ Wheezing/+  SOB/ Hemoptysis/ Sputum production  	  Cardiovascular: No Chest pain/ Palpitations/ Diaphoresis	    Gastrointestinal: No Nausea/ Vomiting/ Constipation/ Appetite Change	    Genitourinary: No Heamturia/ Dysuria/ Frequency change/ Impotence	    Musculoskeletal: No Pain/ Weakness/ Claudication	    Neurological: No Seizures/ TIA/CVA/ Parastesias	    Psychiatric: No Dementia/ Depression/ SI/HI	    Hematology/Lymphatics: No hx of bleeding/ Edema	    Endocrine:	No Hyperglycemia/ Hypoglycemia    Allergic/Immunologic:	 No Anaphylaxis/ Intolerance/ Recent illnesses    MEDICATIONS  (STANDING):  aspirin enteric coated 81 milliGRAM(s) Oral daily  atorvastatin 80 milliGRAM(s) Oral at bedtime  carvedilol 6.25 milliGRAM(s) Oral every 12 hours  dextrose 5%. 1000 milliLiter(s) (50 mL/Hr) IV Continuous <Continuous>  dextrose 50% Injectable 12.5 Gram(s) IV Push once  dextrose 50% Injectable 25 Gram(s) IV Push once  dextrose 50% Injectable 25 Gram(s) IV Push once  DULoxetine 60 milliGRAM(s) Oral daily  ferrous    sulfate 325 milliGRAM(s) Oral daily  furosemide   Injectable 40 milliGRAM(s) IV Push two times a day  gabapentin 100 milliGRAM(s) Oral three times a day  insulin glargine Injectable (LANTUS) 15 Unit(s) SubCutaneous at bedtime  insulin lispro (HumaLOG) corrective regimen sliding scale   SubCutaneous three times a day before meals  isosorbide   mononitrate ER Tablet (IMDUR) 60 milliGRAM(s) Oral daily  melatonin 3 milliGRAM(s) Oral at bedtime  nystatin Powder 1 Application(s) Topical two times a day  pantoprazole    Tablet 40 milliGRAM(s) Oral before breakfast  tamsulosin 0.4 milliGRAM(s) Oral at bedtime    MEDICATIONS  (PRN):  dextrose 40% Gel 15 Gram(s) Oral once PRN Blood Glucose LESS THAN 70 milliGRAM(s)/deciliter  glucagon  Injectable 1 milliGRAM(s) IntraMuscular once PRN Glucose LESS THAN 70 milligrams/deciliter  sodium chloride 0.65% Nasal 1 Spray(s) Both Nostrils four times a day PRN Nasal Congestion or dry nares      Allergies    Allergy Status Unknown    Intolerances      Vital Signs Last 24 Hrs  T(C): 37.1 (20 May 2019 10:14), Max: 37.1 (20 May 2019 10:14)  T(F): 98.7 (20 May 2019 10:14), Max: 98.7 (20 May 2019 10:14)  HR: 70 (20 May 2019 10:14) (70 - 74)  BP: 113/59 (20 May 2019 10:14) (113/59 - 130/65)  BP(mean): --  RR: 18 (20 May 2019 10:14) (18 - 18)  SpO2: 100% (20 May 2019 10:14) (97% - 100%)    General: WN/WD NAD  Neurology: Awake, nonfocal, GAN x 4  Eyes: Scleras R opaque + blindness d/t retinal detachment x 10 years  Neck: Neck supple, trachea midline, No JVD,   Respiratory: Clear on left, markedly diminished on right, Supplemental O2  CV: RRR, S1S2, no murmurs, rubs or gallops  Abdominal: Soft, NT, ND +BS,   Extremities: No edema, + peripheral pulses  Skin: No Rashes, Hematoma, Ecchymosis  Lymphatic: No Neck, axilla, groin LAD  Psych: Oriented x 3, normal affect    LABS:                        8.4    6.6   )-----------( 188      ( 19 May 2019 06:48 )             23.5     05-20    140  |  101  |  39.0<H>  ----------------------------<  152<H>  4.1   |  31.0<H>  |  1.12    Ca    9.1      20 May 2019 12:13  Mg     2.0     05-20    TPro  6.6  /  Alb  3.1<L>  /  TBili  0.9  /  DBili  x   /  AST  58<H>  /  ALT  99<H>  /  AlkPhos  190<H>  05-19          RADIOLOGY & ADDITIONAL STUDIES:  CT: < from: CT Chest No Cont (05.19.19 @ 17:07) >  Moderate loculated right pleural effusion and trace left pleural effusion.    Faint groundglass opacities compatible mild pulmonary vascular congestion.    Questionable heterogeneity at the dome of the liver, although evaluation   is limited due to streak artifact. Small upper abdominal ascites.   Consider further evaluation with nonemergent CT of the abdomen pelvis.    < end of copied text >    ASSESSMENT:   64yFemalePAST MEDICAL & SURGICAL HISTORY:  Congestive heart failure, unspecified HF chronicity, unspecified heart failure type  Insomnia  Retinal detachment  CAD (coronary artery disease)  Glaucoma  Urinary retention  Peripheral neuropathy  GERD (gastroesophageal reflux disease)  Depression  Diabetes  Hypertension, unspecified type  No significant past surgical history  HEALTH ISSUES - PROBLEM Dx:  Anemia: Anemia  Renal insufficiency: Renal insufficiency  Hyperkalemia: Hyperkalemia  Congestive heart failure, unspecified HF chronicity, unspecified heart failure type: Congestive heart failure, unspecified HF chronicity, unspecified heart failure type  Pulmonary hypertension: Pulmonary hypertension  CAD (coronary artery disease): CAD (coronary artery disease)  Hypertension, unspecified type: Hypertension, unspecified type  Pleural effusion: Pleural effusion  Acute pulmonary edema: Acute pulmonary edema            PLAN:    R effusion by CT ? loculated  Will obtain bedside U/S possible pigtail if drainable  Discussed with Dr Escudero HPI:  64F presented with increasing dyspnea. The patient reported that the shortness of breath started day prior to admission and was associated with significant fatigue. As per the emergency department notes, the patient was noted to be "slow to respond" two days prior. The patient denied any associated chest pain or palpitations but admits to increased lower extremity swelling. She denied any orthopnea or increased urination. The patient denied any recent changes in her diet or medications and reports being compliant with her medications. She reports similar symptoms in the past for which she was admitted to the hospital a few months prior. She is unaware of any aggravating or relieving factors. She denied any associated fevers or chills. There was no audible wheezing. (17 May 2019 15:52)      PAST MEDICAL & SURGICAL HISTORY:  Congestive heart failure, unspecified HF chronicity, unspecified heart failure type  Insomnia  Retinal detachment  CAD (coronary artery disease)  Glaucoma  Urinary retention  Peripheral neuropathy  GERD (gastroesophageal reflux disease)  Depression  Diabetes  Hypertension, unspecified type  No significant past surgical history      REVIEW OF SYSTEMS      General:No Weight change/+  Fatigue/ HA/Dizzy	    Skin/Breast: No Rashes/ Lesions/ Masses  	  Ophthalmologic: R eye blindness d/t retinal detachment x 10 years  	  ENMT: No Hearing loss/ Drainage/ Lesions	    Respiratory and Thorax: No Cough/ Wheezing/+  SOB/ Hemoptysis/ Sputum production  	  Cardiovascular: No Chest pain/ Palpitations/ Diaphoresis	    Gastrointestinal: No Nausea/ Vomiting/ Constipation/ Appetite Change	    Genitourinary: No Heamturia/ Dysuria/ Frequency change/ Impotence	    Musculoskeletal: No Pain/ Weakness/ Claudication	    Neurological: No Seizures/ TIA/CVA/ Parastesias	    Psychiatric: No Dementia/ Depression/ SI/HI	    Hematology/Lymphatics: No hx of bleeding/ Edema	    Endocrine:	No Hyperglycemia/ Hypoglycemia    Allergic/Immunologic:	 No Anaphylaxis/ Intolerance/ Recent illnesses    MEDICATIONS  (STANDING):  aspirin enteric coated 81 milliGRAM(s) Oral daily  atorvastatin 80 milliGRAM(s) Oral at bedtime  carvedilol 6.25 milliGRAM(s) Oral every 12 hours  dextrose 5%. 1000 milliLiter(s) (50 mL/Hr) IV Continuous <Continuous>  dextrose 50% Injectable 12.5 Gram(s) IV Push once  dextrose 50% Injectable 25 Gram(s) IV Push once  dextrose 50% Injectable 25 Gram(s) IV Push once  DULoxetine 60 milliGRAM(s) Oral daily  ferrous    sulfate 325 milliGRAM(s) Oral daily  furosemide   Injectable 40 milliGRAM(s) IV Push two times a day  gabapentin 100 milliGRAM(s) Oral three times a day  insulin glargine Injectable (LANTUS) 15 Unit(s) SubCutaneous at bedtime  insulin lispro (HumaLOG) corrective regimen sliding scale   SubCutaneous three times a day before meals  isosorbide   mononitrate ER Tablet (IMDUR) 60 milliGRAM(s) Oral daily  melatonin 3 milliGRAM(s) Oral at bedtime  nystatin Powder 1 Application(s) Topical two times a day  pantoprazole    Tablet 40 milliGRAM(s) Oral before breakfast  tamsulosin 0.4 milliGRAM(s) Oral at bedtime    MEDICATIONS  (PRN):  dextrose 40% Gel 15 Gram(s) Oral once PRN Blood Glucose LESS THAN 70 milliGRAM(s)/deciliter  glucagon  Injectable 1 milliGRAM(s) IntraMuscular once PRN Glucose LESS THAN 70 milligrams/deciliter  sodium chloride 0.65% Nasal 1 Spray(s) Both Nostrils four times a day PRN Nasal Congestion or dry nares      Allergies    Allergy Status Unknown    Intolerances      Vital Signs Last 24 Hrs  T(C): 37.1 (20 May 2019 10:14), Max: 37.1 (20 May 2019 10:14)  T(F): 98.7 (20 May 2019 10:14), Max: 98.7 (20 May 2019 10:14)  HR: 70 (20 May 2019 10:14) (70 - 74)  BP: 113/59 (20 May 2019 10:14) (113/59 - 130/65)  BP(mean): --  RR: 18 (20 May 2019 10:14) (18 - 18)  SpO2: 100% (20 May 2019 10:14) (97% - 100%)    General: WN/WD NAD  Neurology: Awake, nonfocal, GAN x 4  Eyes: Scleras R opaque + blindness d/t retinal detachment x 10 years  Neck: Neck supple, trachea midline, No JVD,   Respiratory: Clear on left, markedly diminished on right, Supplemental O2  CV: RRR, S1S2, no murmurs, rubs or gallops  Abdominal: Soft, NT, ND +BS,   Extremities: No edema, + peripheral pulses  Skin: No Rashes, Hematoma, Ecchymosis  Lymphatic: No Neck, axilla, groin LAD  Psych: Oriented x 3, normal affect    LABS:                        8.4    6.6   )-----------( 188      ( 19 May 2019 06:48 )             23.5     05-20    140  |  101  |  39.0<H>  ----------------------------<  152<H>  4.1   |  31.0<H>  |  1.12    Ca    9.1      20 May 2019 12:13  Mg     2.0     05-20    TPro  6.6  /  Alb  3.1<L>  /  TBili  0.9  /  DBili  x   /  AST  58<H>  /  ALT  99<H>  /  AlkPhos  190<H>  05-19          RADIOLOGY & ADDITIONAL STUDIES:  CT: < from: CT Chest No Cont (05.19.19 @ 17:07) >  Moderate loculated right pleural effusion and trace left pleural effusion.    Faint groundglass opacities compatible mild pulmonary vascular congestion.    Questionable heterogeneity at the dome of the liver, although evaluation   is limited due to streak artifact. Small upper abdominal ascites.   Consider further evaluation with nonemergent CT of the abdomen pelvis.    < end of copied text >    ASSESSMENT:   64yFemalePAST MEDICAL & SURGICAL HISTORY:  Congestive heart failure, unspecified HF chronicity, unspecified heart failure type  Insomnia  Retinal detachment  CAD (coronary artery disease)  Glaucoma  Urinary retention  Peripheral neuropathy  GERD (gastroesophageal reflux disease)  Depression  Diabetes  Hypertension, unspecified type  No significant past surgical history  HEALTH ISSUES - PROBLEM Dx:  Anemia: Anemia  Renal insufficiency: Renal insufficiency  Hyperkalemia: Hyperkalemia  Congestive heart failure, unspecified HF chronicity, unspecified heart failure type: Congestive heart failure, unspecified HF chronicity, unspecified heart failure type  Pulmonary hypertension: Pulmonary hypertension  CAD (coronary artery disease): CAD (coronary artery disease)  Hypertension, unspecified type: Hypertension, unspecified type  Pleural effusion: Pleural effusion  Acute pulmonary edema: Acute pulmonary edema            PLAN:    R effusion by CT ? loculated  Will obtain bedside U/S possible pigtail if drainable  Discussed with Dr Escudero    Addendum  TAMANNA Barton U/S r chest>  no appreciable fluid to tap> discussed with dr Escudero

## 2019-05-21 LAB
ANION GAP SERPL CALC-SCNC: 9 MMOL/L — SIGNIFICANT CHANGE UP (ref 5–17)
BUN SERPL-MCNC: 33 MG/DL — HIGH (ref 8–20)
CALCIUM SERPL-MCNC: 9 MG/DL — SIGNIFICANT CHANGE UP (ref 8.6–10.2)
CHLORIDE SERPL-SCNC: 101 MMOL/L — SIGNIFICANT CHANGE UP (ref 98–107)
CO2 SERPL-SCNC: 30 MMOL/L — HIGH (ref 22–29)
CREAT SERPL-MCNC: 1.24 MG/DL — SIGNIFICANT CHANGE UP (ref 0.5–1.3)
GLUCOSE BLDC GLUCOMTR-MCNC: 134 MG/DL — HIGH (ref 70–99)
GLUCOSE BLDC GLUCOMTR-MCNC: 199 MG/DL — HIGH (ref 70–99)
GLUCOSE BLDC GLUCOMTR-MCNC: 200 MG/DL — HIGH (ref 70–99)
GLUCOSE BLDC GLUCOMTR-MCNC: 270 MG/DL — HIGH (ref 70–99)
GLUCOSE SERPL-MCNC: 127 MG/DL — HIGH (ref 70–115)
HCT VFR BLD CALC: 25.3 % — LOW (ref 37–47)
HGB BLD-MCNC: 8.5 G/DL — LOW (ref 12–16)
MAGNESIUM SERPL-MCNC: 1.9 MG/DL — SIGNIFICANT CHANGE UP (ref 1.6–2.6)
MCHC RBC-ENTMCNC: 29.7 PG — SIGNIFICANT CHANGE UP (ref 27–31)
MCHC RBC-ENTMCNC: 33.6 G/DL — SIGNIFICANT CHANGE UP (ref 32–36)
MCV RBC AUTO: 88.5 FL — SIGNIFICANT CHANGE UP (ref 81–99)
PLATELET # BLD AUTO: 187 K/UL — SIGNIFICANT CHANGE UP (ref 150–400)
POTASSIUM SERPL-MCNC: 4 MMOL/L — SIGNIFICANT CHANGE UP (ref 3.5–5.3)
POTASSIUM SERPL-SCNC: 4 MMOL/L — SIGNIFICANT CHANGE UP (ref 3.5–5.3)
RBC # BLD: 2.86 M/UL — LOW (ref 4.4–5.2)
RBC # FLD: 19.2 % — HIGH (ref 11–15.6)
SODIUM SERPL-SCNC: 140 MMOL/L — SIGNIFICANT CHANGE UP (ref 135–145)
WBC # BLD: 5.4 K/UL — SIGNIFICANT CHANGE UP (ref 4.8–10.8)
WBC # FLD AUTO: 5.4 K/UL — SIGNIFICANT CHANGE UP (ref 4.8–10.8)

## 2019-05-21 PROCEDURE — 99232 SBSQ HOSP IP/OBS MODERATE 35: CPT

## 2019-05-21 RX ADMIN — GABAPENTIN 100 MILLIGRAM(S): 400 CAPSULE ORAL at 13:43

## 2019-05-21 RX ADMIN — Medication 40 MILLIGRAM(S): at 05:52

## 2019-05-21 RX ADMIN — DULOXETINE HYDROCHLORIDE 60 MILLIGRAM(S): 30 CAPSULE, DELAYED RELEASE ORAL at 12:23

## 2019-05-21 RX ADMIN — CARVEDILOL PHOSPHATE 6.25 MILLIGRAM(S): 80 CAPSULE, EXTENDED RELEASE ORAL at 18:11

## 2019-05-21 RX ADMIN — Medication 2: at 17:50

## 2019-05-21 RX ADMIN — Medication 2: at 12:26

## 2019-05-21 RX ADMIN — GABAPENTIN 100 MILLIGRAM(S): 400 CAPSULE ORAL at 21:05

## 2019-05-21 RX ADMIN — LOSARTAN POTASSIUM 50 MILLIGRAM(S): 100 TABLET, FILM COATED ORAL at 05:52

## 2019-05-21 RX ADMIN — NYSTATIN CREAM 1 APPLICATION(S): 100000 CREAM TOPICAL at 05:52

## 2019-05-21 RX ADMIN — TAMSULOSIN HYDROCHLORIDE 0.4 MILLIGRAM(S): 0.4 CAPSULE ORAL at 21:05

## 2019-05-21 RX ADMIN — PANTOPRAZOLE SODIUM 40 MILLIGRAM(S): 20 TABLET, DELAYED RELEASE ORAL at 05:52

## 2019-05-21 RX ADMIN — Medication 325 MILLIGRAM(S): at 12:23

## 2019-05-21 RX ADMIN — ATORVASTATIN CALCIUM 80 MILLIGRAM(S): 80 TABLET, FILM COATED ORAL at 21:05

## 2019-05-21 RX ADMIN — NYSTATIN CREAM 1 APPLICATION(S): 100000 CREAM TOPICAL at 18:11

## 2019-05-21 RX ADMIN — CARVEDILOL PHOSPHATE 6.25 MILLIGRAM(S): 80 CAPSULE, EXTENDED RELEASE ORAL at 05:52

## 2019-05-21 RX ADMIN — Medication 40 MILLIGRAM(S): at 18:12

## 2019-05-21 RX ADMIN — ISOSORBIDE MONONITRATE 60 MILLIGRAM(S): 60 TABLET, EXTENDED RELEASE ORAL at 12:23

## 2019-05-21 RX ADMIN — Medication 81 MILLIGRAM(S): at 12:26

## 2019-05-21 RX ADMIN — GABAPENTIN 100 MILLIGRAM(S): 400 CAPSULE ORAL at 05:52

## 2019-05-21 RX ADMIN — INSULIN GLARGINE 15 UNIT(S): 100 INJECTION, SOLUTION SUBCUTANEOUS at 21:32

## 2019-05-21 NOTE — PROGRESS NOTE ADULT - ASSESSMENT
64F with a history of CABG, Diastolic HF, diabetes, GERD with prior GI bleed, peripheral vascular disease and neuropathy presented with increased dyspnea and fatigue. She was found to have hypoxia (88% on ambient air), lower extremity edema, and chest Xray consistent with pulmonary edema. Laboratory studies also noted acute kidney injury and hyperkalemia.    Acute on chronic diastolic heart failure - Prior echocardiogram(under different chart MR#[id number]) noted preserved ventricular function and moderate pulmonary hypertension. Intravenous furosemide initiated for diuresis. On carvedilol and isosorbide. Losartan to be discontinued due to acute kidney injury and hyperkalemia. Cardiology following and planning on Right heart cath and cardiomem on 5/22    Acute kidney injury / Hyperkalemia - Losartan held on admission. Kayexalate for hyperkalemia.  improving. Continue on tamsulosin for history of urine retention. Repeat laboratory studies ordered to monitor.    improving     Coronary artery disease - No chest pain. serial troponin elevated but flat in the setting of acute kidney injury and heart failure. On aspirin and atorvastatin.    Diabetes - Insulin coverage, close monitoring of blood glucose levels. Gabapentin for neuropathy.    improved FS on lantus.    GERD / History of GI bleed - On pantoprazole. Diet as tolerated.    right pleural effusion: no respiratory compromise. CT chest with Moderate loculated right pleural effusion and trace left pleural effusion.    d/w CT surgery likely no intervention     mild iron def anemia: start ferrous sulfate     asymptomatic bacteriuria: monitor. poor sample as epithelial cells noted     PT evaluation--[facility] vs home

## 2019-05-21 NOTE — CDI QUERY NOTE - NSCDIOTHERTXTBX_GEN_ALL_CORE_HH
64 year old female noted with HTN, DM, KEITH, acute on chronic diastolic heart failure, and renal insufficiency.     Can the acuity of renal insufficiency be further specified if known?  A) Likely chronic renal insufficiency  B) renal insufficiency unspecified  C) Other, please specify  D) Not clinically significant

## 2019-05-21 NOTE — PROGRESS NOTE ADULT - SUBJECTIVE AND OBJECTIVE BOX
seen for HF exac    no acute complaints  comfortable in chair  ros negative     MEDICATIONS  (STANDING):  aspirin enteric coated 81 milliGRAM(s) Oral daily  atorvastatin 80 milliGRAM(s) Oral at bedtime  carvedilol 6.25 milliGRAM(s) Oral every 12 hours  dextrose 5%. 1000 milliLiter(s) (50 mL/Hr) IV Continuous <Continuous>  dextrose 50% Injectable 12.5 Gram(s) IV Push once  dextrose 50% Injectable 25 Gram(s) IV Push once  dextrose 50% Injectable 25 Gram(s) IV Push once  DULoxetine 60 milliGRAM(s) Oral daily  ferrous    sulfate 325 milliGRAM(s) Oral daily  furosemide   Injectable 40 milliGRAM(s) IV Push two times a day  gabapentin 100 milliGRAM(s) Oral three times a day  insulin glargine Injectable (LANTUS) 15 Unit(s) SubCutaneous at bedtime  insulin lispro (HumaLOG) corrective regimen sliding scale   SubCutaneous three times a day before meals  isosorbide   mononitrate ER Tablet (IMDUR) 60 milliGRAM(s) Oral daily  losartan 50 milliGRAM(s) Oral daily  melatonin 3 milliGRAM(s) Oral at bedtime  nystatin Powder 1 Application(s) Topical two times a day  pantoprazole    Tablet 40 milliGRAM(s) Oral before breakfast  tamsulosin 0.4 milliGRAM(s) Oral at bedtime    MEDICATIONS  (PRN):  dextrose 40% Gel 15 Gram(s) Oral once PRN Blood Glucose LESS THAN 70 milliGRAM(s)/deciliter  glucagon  Injectable 1 milliGRAM(s) IntraMuscular once PRN Glucose LESS THAN 70 milligrams/deciliter  sodium chloride 0.65% Nasal 1 Spray(s) Both Nostrils four times a day PRN Nasal Congestion or dry nares      Allergies    Allergy Status Unknown      Vital Signs Last 24 Hrs  T(C): 36.6 (21 May 2019 10:24), Max: 36.9 (21 May 2019 05:15)  T(F): 97.8 (21 May 2019 10:24), Max: 98.4 (21 May 2019 05:15)  HR: 72 (21 May 2019 12:04) (71 - 76)  BP: 141/70 (21 May 2019 12:04) (123/58 - 149/72)  BP(mean): --  RR: 16 (21 May 2019 10:24) (16 - 18)  SpO2: 99% (21 May 2019 10:24) (97% - 99%)    PHYSICAL EXAM:    GENERAL: NAD  CHEST/LUNG: dec bs right base   HEART: Regular rate and rhythm; S1 S2  ABDOMEN: Soft, Bowel sounds present  EXTREMITIES: edema  NERVOUS SYSTEM:  Alert & Oriented X3, nonfocal    LABS:                        8.5    5.4   )-----------( 187      ( 21 May 2019 06:18 )             25.3     05-21    140  |  101  |  33.0<H>  ----------------------------<  127<H>  4.0   |  30.0<H>  |  1.24    Ca    9.0      21 May 2019 06:18  Mg     1.9     05-21            CAPILLARY BLOOD GLUCOSE      POCT Blood Glucose.: 200 mg/dL (21 May 2019 12:23)  POCT Blood Glucose.: 134 mg/dL (21 May 2019 08:29)  POCT Blood Glucose.: 174 mg/dL (20 May 2019 22:17)  POCT Blood Glucose.: 218 mg/dL (20 May 2019 17:37)        RADIOLOGY & ADDITIONAL TESTS:

## 2019-05-22 LAB
ANION GAP SERPL CALC-SCNC: 9 MMOL/L — SIGNIFICANT CHANGE UP (ref 5–17)
BUN SERPL-MCNC: 30 MG/DL — HIGH (ref 8–20)
CALCIUM SERPL-MCNC: 8.8 MG/DL — SIGNIFICANT CHANGE UP (ref 8.6–10.2)
CHLORIDE SERPL-SCNC: 100 MMOL/L — SIGNIFICANT CHANGE UP (ref 98–107)
CO2 SERPL-SCNC: 31 MMOL/L — HIGH (ref 22–29)
CREAT SERPL-MCNC: 1.07 MG/DL — SIGNIFICANT CHANGE UP (ref 0.5–1.3)
GLUCOSE BLDC GLUCOMTR-MCNC: 135 MG/DL — HIGH (ref 70–99)
GLUCOSE BLDC GLUCOMTR-MCNC: 153 MG/DL — HIGH (ref 70–99)
GLUCOSE BLDC GLUCOMTR-MCNC: 187 MG/DL — HIGH (ref 70–99)
GLUCOSE BLDC GLUCOMTR-MCNC: 269 MG/DL — HIGH (ref 70–99)
GLUCOSE SERPL-MCNC: 181 MG/DL — HIGH (ref 70–115)
MAGNESIUM SERPL-MCNC: 1.8 MG/DL — SIGNIFICANT CHANGE UP (ref 1.6–2.6)
POTASSIUM SERPL-MCNC: 4.3 MMOL/L — SIGNIFICANT CHANGE UP (ref 3.5–5.3)
POTASSIUM SERPL-SCNC: 4.3 MMOL/L — SIGNIFICANT CHANGE UP (ref 3.5–5.3)
SODIUM SERPL-SCNC: 140 MMOL/L — SIGNIFICANT CHANGE UP (ref 135–145)

## 2019-05-22 PROCEDURE — 33289 TCAT IMPL WRLS P-ART PRS SNR: CPT

## 2019-05-22 PROCEDURE — 99152 MOD SED SAME PHYS/QHP 5/>YRS: CPT

## 2019-05-22 PROCEDURE — 99231 SBSQ HOSP IP/OBS SF/LOW 25: CPT | Mod: 25

## 2019-05-22 PROCEDURE — 99232 SBSQ HOSP IP/OBS MODERATE 35: CPT

## 2019-05-22 RX ORDER — FUROSEMIDE 40 MG
40 TABLET ORAL ONCE
Refills: 0 | Status: COMPLETED | OUTPATIENT
Start: 2019-05-22 | End: 2019-05-22

## 2019-05-22 RX ORDER — CLOPIDOGREL BISULFATE 75 MG/1
75 TABLET, FILM COATED ORAL DAILY
Refills: 0 | Status: DISCONTINUED | OUTPATIENT
Start: 2019-05-23 | End: 2019-05-23

## 2019-05-22 RX ADMIN — Medication 40 MILLIGRAM(S): at 05:50

## 2019-05-22 RX ADMIN — NYSTATIN CREAM 1 APPLICATION(S): 100000 CREAM TOPICAL at 17:54

## 2019-05-22 RX ADMIN — GABAPENTIN 100 MILLIGRAM(S): 400 CAPSULE ORAL at 21:01

## 2019-05-22 RX ADMIN — ISOSORBIDE MONONITRATE 60 MILLIGRAM(S): 60 TABLET, EXTENDED RELEASE ORAL at 08:23

## 2019-05-22 RX ADMIN — NYSTATIN CREAM 1 APPLICATION(S): 100000 CREAM TOPICAL at 05:50

## 2019-05-22 RX ADMIN — CARVEDILOL PHOSPHATE 6.25 MILLIGRAM(S): 80 CAPSULE, EXTENDED RELEASE ORAL at 17:53

## 2019-05-22 RX ADMIN — GABAPENTIN 100 MILLIGRAM(S): 400 CAPSULE ORAL at 17:53

## 2019-05-22 RX ADMIN — ATORVASTATIN CALCIUM 80 MILLIGRAM(S): 80 TABLET, FILM COATED ORAL at 21:02

## 2019-05-22 RX ADMIN — TAMSULOSIN HYDROCHLORIDE 0.4 MILLIGRAM(S): 0.4 CAPSULE ORAL at 21:01

## 2019-05-22 RX ADMIN — INSULIN GLARGINE 15 UNIT(S): 100 INJECTION, SOLUTION SUBCUTANEOUS at 22:22

## 2019-05-22 RX ADMIN — Medication 40 MILLIGRAM(S): at 17:54

## 2019-05-22 RX ADMIN — Medication 2: at 17:54

## 2019-05-22 RX ADMIN — PANTOPRAZOLE SODIUM 40 MILLIGRAM(S): 20 TABLET, DELAYED RELEASE ORAL at 05:50

## 2019-05-22 RX ADMIN — DULOXETINE HYDROCHLORIDE 60 MILLIGRAM(S): 30 CAPSULE, DELAYED RELEASE ORAL at 08:23

## 2019-05-22 RX ADMIN — CARVEDILOL PHOSPHATE 6.25 MILLIGRAM(S): 80 CAPSULE, EXTENDED RELEASE ORAL at 05:50

## 2019-05-22 RX ADMIN — Medication 81 MILLIGRAM(S): at 08:22

## 2019-05-22 RX ADMIN — Medication 325 MILLIGRAM(S): at 08:23

## 2019-05-22 NOTE — PROGRESS NOTE ADULT - ASSESSMENT
64F with a history of CABG, Diastolic HF, diabetes, GERD with prior GI bleed, peripheral vascular disease and neuropathy presented with increased dyspnea and fatigue. She was found to have hypoxia (88% on ambient air), lower extremity edema, and chest Xray consistent with pulmonary edema. Laboratory studies also noted acute kidney injury and hyperkalemia.    Acute on chronic diastolic heart failure - Prior echocardiogram(under different chart MR#[id number]) noted preserved ventricular function and moderate pulmonary hypertension. Intravenous furosemide initiated for diuresis. On carvedilol and isosorbide. Losartan to be discontinued due to acute kidney injury and hyperkalemia. Cardiology following and underwent RHC and cardiomem placement on 5/22   IV lasix today, torsemide PO tomorrow     Acute kidney injury / Hyperkalemia - Losartan held on admission. Kayexalate for hyperkalemia.  improving. Continue on tamsulosin for history of urine retention. Repeat laboratory studies ordered to monitor.    improving     Coronary artery disease - No chest pain. serial troponin elevated but flat in the setting of acute kidney injury and heart failure. On aspirin and atorvastatin.    plavix x1 month    Diabetes - Insulin coverage, close monitoring of blood glucose levels. Gabapentin for neuropathy.    improved FS on lantus.    GERD / History of GI bleed - On pantoprazole. Diet as tolerated.    right pleural effusion: no respiratory compromise. CT chest with Moderate loculated right pleural effusion and trace left pleural effusion.    d/w CT surgery no intervention     mild iron def anemia: start ferrous sulfate     asymptomatic bacteriuria: monitor. poor sample as epithelial cells noted     PT evaluation--[facility] vs home     dc planning. d/w patient and cardiology. patient would like to be discharged home.

## 2019-05-22 NOTE — DIETITIAN INITIAL EVALUATION ADULT. - NS AS NUTRI INTERV ED CONTENT
Nutrition relationship to health/disease/Recommended modifications/Literature provided at bedside- RD to follow up with verbal education as feasible/Purpose of the nutrition education

## 2019-05-22 NOTE — PROGRESS NOTE ADULT - SUBJECTIVE AND OBJECTIVE BOX
Department of Cardiology                                                                  [facility]/[place] [address] [place] [address]                                                            Telephone: [phone]. Fax:[phone]                                                                                 Cardiac Cath NP Note      Narrative:  64F presented with increasing dyspnea. The patient reported that the shortness of breath started yesterday and was associated with significant fatigue. As per the emergency department notes, the patient was noted to be "slow to respond" two days prior. The patient denied any associated chest pain or palpitations but admits to increased lower extremity swelling. She denied any orthopnea or increased urination. The patient denied any recent changes in her diet or medications and reports being compliant with her medications. She reports similar symptoms in the past for which she was admitted to the hospital a few months prior. She is unaware of any aggravating or relieving factors. She denied any associated fevers or chills. There was no audible wheezing.    Pt presents today for RHC/cardiomems placement with Dr. Recio.    ASA: 3  Mallampati: 2  Bleeding Risk: 6.0%  Creatinine: 1.07  GFR: 41  BNP: 7026      MEDICATIONS:  carvedilol 6.25 milliGRAM(s) Oral every 12 hours  furosemide   Injectable 40 milliGRAM(s) IV Push two times a day  isosorbide   mononitrate ER Tablet (IMDUR) 60 milliGRAM(s) Oral daily  tamsulosin 0.4 milliGRAM(s) Oral at bedtime  DULoxetine 60 milliGRAM(s) Oral daily  gabapentin 100 milliGRAM(s) Oral three times a day  melatonin 3 milliGRAM(s) Oral at bedtime  pantoprazole    Tablet 40 milliGRAM(s) Oral before breakfast  atorvastatin 80 milliGRAM(s) Oral at bedtime  dextrose 40% Gel 15 Gram(s) Oral once PRN  dextrose 50% Injectable 12.5 Gram(s) IV Push once  dextrose 50% Injectable 25 Gram(s) IV Push once  dextrose 50% Injectable 25 Gram(s) IV Push once  glucagon  Injectable 1 milliGRAM(s) IntraMuscular once PRN  insulin glargine Injectable (LANTUS) 15 Unit(s) SubCutaneous at bedtime  insulin lispro (HumaLOG) corrective regimen sliding scale   SubCutaneous three times a day before meals  aspirin enteric coated 81 milliGRAM(s) Oral daily  dextrose 5%. 1000 milliLiter(s) IV Continuous <Continuous>  ferrous    sulfate 325 milliGRAM(s) Oral daily  nystatin Powder 1 Application(s) Topical two times a day  sodium chloride 0.65% Nasal 1 Spray(s) Both Nostrils four times a day PRN        PHYSICAL EXAM:  T(C): 36.7 (19 @ 09:36), Max: 36.8 (19 @ 05:33)  HR: 73 (19 @ 09:36) (71 - 77)  BP: 137/60 (19 @ 09:36) (123/58 - 162/64)  RR: 18 (19 @ 09:36) (16 - 20)  SpO2: 96% (19 @ 09:36) (92% - 100%)  Wt(kg): --    I&O's Summary    21 May 2019 07:01  -  22 May 2019 07:00  --------------------------------------------------------  IN: 780 mL / OUT: 0 mL / NET: 780 mL    Daily Weight in k.3 (22 May 2019 09:28)    Constitutional: A & O x 3  HEENT:   Normal oral mucosa, PERRL, EOMI	  Cardiovascular: Normal S1 S2, No JVD, No murmurs, No edema  Respiratory: Lungs clear to auscultation	  Gastrointestinal:  Soft, Non-tender, + BS	  Skin: No rashes, No ecchymoses, No cyanosis  Neurologic: Non-focal  Extremities: Normal range of motion, No clubbing, cyanosis   Vascular: Peripheral pulses palpable 2+ bilaterally, BLLE 2+edema      LABS:	                         8.5    5.4   )-----------( 187      ( 21 May 2019 06:18 )             25.3         140  |  100  |  30.0<H>  ----------------------------<  181<H>  4.3   |  31.0<H>  |  1.07    Ca    8.8      22 May 2019 06:10  Mg     1.8           proBNP: 7026  HgA1c: 7.7      ASSESSMENT: 63 y/o Black F presents today for RHC with Dr. Recio; cardiomems to be placed.    -consent to be obtained  -procedure discussed with patient; risks and benefits explained, questions answered  -labs and ECG reviewed; will assess need for IVF post procedure given setting of CHF and RHC (minimal dye used for cardiomems placement)

## 2019-05-22 NOTE — PROGRESS NOTE ADULT - SUBJECTIVE AND OBJECTIVE BOX
Severe pulmonary hypertension.     Wedge pressure 26 mmHg.     Cardiomems placed.     Continue lasix iv bid tonight.     Start home dose torsemide tomorrow.     Follow up in office.

## 2019-05-22 NOTE — DIETITIAN INITIAL EVALUATION ADULT. - OTHER INFO
64F with a history of CABG, Diastolic HF, diabetes, GERD with prior GI bleed, peripheral vascular disease and neuropathy presents with dyspnea and fatigue, found to have hypoxia, lower extremity edema, and chest Xray consistent with pulmonary edema. Attempted to interview x 2, pt sleeping then off unit at cath lab. Per documentation, pt has been with good po intake (% of meals). Takes Insulin at home per outpatient med review. Provided with heart failure and diabetes nutrition therapy literature at bedside.

## 2019-05-22 NOTE — PROGRESS NOTE ADULT - SUBJECTIVE AND OBJECTIVE BOX
seen for HF    seen post cath  no acute complaints. laying flat in bed without distress  ros otherwise negative     MEDICATIONS  (STANDING):  aspirin enteric coated 81 milliGRAM(s) Oral daily  atorvastatin 80 milliGRAM(s) Oral at bedtime  carvedilol 6.25 milliGRAM(s) Oral every 12 hours  dextrose 5%. 1000 milliLiter(s) (50 mL/Hr) IV Continuous <Continuous>  dextrose 50% Injectable 12.5 Gram(s) IV Push once  dextrose 50% Injectable 25 Gram(s) IV Push once  dextrose 50% Injectable 25 Gram(s) IV Push once  DULoxetine 60 milliGRAM(s) Oral daily  ferrous    sulfate 325 milliGRAM(s) Oral daily  furosemide   Injectable 40 milliGRAM(s) IV Push once  gabapentin 100 milliGRAM(s) Oral three times a day  insulin glargine Injectable (LANTUS) 15 Unit(s) SubCutaneous at bedtime  insulin lispro (HumaLOG) corrective regimen sliding scale   SubCutaneous three times a day before meals  isosorbide   mononitrate ER Tablet (IMDUR) 60 milliGRAM(s) Oral daily  melatonin 3 milliGRAM(s) Oral at bedtime  nystatin Powder 1 Application(s) Topical two times a day  pantoprazole    Tablet 40 milliGRAM(s) Oral before breakfast  tamsulosin 0.4 milliGRAM(s) Oral at bedtime    MEDICATIONS  (PRN):  dextrose 40% Gel 15 Gram(s) Oral once PRN Blood Glucose LESS THAN 70 milliGRAM(s)/deciliter  glucagon  Injectable 1 milliGRAM(s) IntraMuscular once PRN Glucose LESS THAN 70 milligrams/deciliter  sodium chloride 0.65% Nasal 1 Spray(s) Both Nostrils four times a day PRN Nasal Congestion or dry nares      Allergies    Allergy Status Unknown      Vital Signs Last 24 Hrs  T(C): 36.7 (22 May 2019 09:36), Max: 36.8 (22 May 2019 05:33)  T(F): 98.1 (22 May 2019 09:36), Max: 98.2 (22 May 2019 05:33)  HR: 74 (22 May 2019 12:45) (63 - 77)  BP: 112/57 (22 May 2019 12:45) (112/57 - 162/64)  BP(mean): --  RR: 18 (22 May 2019 12:45) (18 - 20)  SpO2: 97% (22 May 2019 12:45) (92% - 100%)    PHYSICAL EXAM:    GENERAL: NAD  CHEST/LUNG: dec bs right base   HEART: Regular rate and rhythm; S1 S2  ABDOMEN: Soft, Bowel sounds present  EXTREMITIES:  trace/+1 edema   NERVOUS SYSTEM:  Alert & Oriented X3, nonfocal    LABS:                        8.5    5.4   )-----------( 187      ( 21 May 2019 06:18 )             25.3     05-22    140  |  100  |  30.0<H>  ----------------------------<  181<H>  4.3   |  31.0<H>  |  1.07    Ca    8.8      22 May 2019 06:10  Mg     1.8     05-22            CAPILLARY BLOOD GLUCOSE      POCT Blood Glucose.: 135 mg/dL (22 May 2019 12:48)  POCT Blood Glucose.: 153 mg/dL (22 May 2019 08:00)  POCT Blood Glucose.: 270 mg/dL (21 May 2019 21:04)  POCT Blood Glucose.: 199 mg/dL (21 May 2019 16:51)        RADIOLOGY & ADDITIONAL TESTS:

## 2019-05-22 NOTE — PROGRESS NOTE ADULT - SUBJECTIVE AND OBJECTIVE BOX
Cath Holding called by Nursing staff on 4 tower at 1920 for this patient stating that the Right groin was bleeding.   This NP went up to the unit and assessed the patient.    Patient A&O x 3  VSS /66, HR 66  Right groin site ALISON, saturated d-stat dressing on bed, site with slow continuous ooz.  Manual pressure applied for 15 minutes and pressure dressing and sandbag applied.  Right pedal pulse positive by doppler.      Situation reported off to Yasmani Sawyer, who will check site again at 2030.

## 2019-05-22 NOTE — DIETITIAN INITIAL EVALUATION ADULT. - PERTINENT LABORATORY DATA
05-22 Na140 mmol/L Glu 181 mg/dL<H> K+ 4.3 mmol/L Cr  1.07 mg/dL BUN 30.0 mg/dL<H> Phos n/a   Alb n/a   PAB n/a   HgA1c 7.7%

## 2019-05-22 NOTE — PROGRESS NOTE ADULT - SUBJECTIVE AND OBJECTIVE BOX
Department of Cardiology                                                                  [facility]/[place] [address] [place] [address]                                                            Telephone: [phone]. Fax:[phone]                                                                                      Cardiac Cath NP Note       Narrative:  64y  Female is now s/p right heart catheterization/ Cardiomems placement via right groin approach with Dr. Recio which showed:  PAWP 24  Cardiomems placed successfully  Plavix 75mg given in lab (to be cont'd for one month         PAST MEDICAL & SURGICAL HISTORY:  Congestive heart failure, unspecified HF chronicity, unspecified heart failure type  Insomnia  Retinal detachment  CAD (coronary artery disease)  Glaucoma  Urinary retention  Peripheral neuropathy  GERD (gastroesophageal reflux disease)  Depression  Diabetes  Hypertension, unspecified type  No significant past surgical history    FAMILY HISTORY:    Home Medications:  albuterol 2.5 mg/3 mL (0.083%) inhalation solution: 3 milliliter(s) inhaled every 4 hours, As Needed (17 May 2019 15:01)  amLODIPine 5 mg oral tablet: 2 tab(s) orally once a day (17 May 2019 15:01)  Aspir 81 oral delayed release tablet: 1 tab(s) orally once a day (17 May 2019 15:01)  Coreg 6.25 mg oral tablet: 1 tab(s) orally 2 times a day (17 May 2019 15:01)  Cymbalta 60 mg oral delayed release capsule: 1 cap(s) orally once a day (17 May 2019 15:01)  furosemide 20 mg oral tablet: 1 tab(s) orally once a day (17 May 2019 15:01)  HumaLOG 100 units/mL subcutaneous solution: 8 unit(s) subcutaneous 3 times a day (before meals) (17 May 2019 15:01)  isosorbide mononitrate 60 mg oral tablet, extended release: 1 tab(s) orally once a day (in the morning) (17 May 2019 15:01)  Lipitor 80 mg oral tablet: 1 tab(s) orally once a day (17 May 2019 15:01)  losartan 50 mg oral tablet: 1 tab(s) orally once a day (17 May 2019 15:01)  Lumigan 0.01% ophthalmic solution: 1 drop(s) to each affected eye once a day (in the evening) (17 May 2019 15:01)  magnesium oxide 400 mg (241.3 mg elemental magnesium) oral tablet: 1 tab(s) orally once a day (17 May 2019 15:01)  Melatonin 3 mg oral tablet: 1 tab(s) orally once (at bedtime) (17 May 2019 15:01)  Neurontin 100 mg oral capsule: 1 cap(s) orally 3 times a day (17 May 2019 15:01)  Percocet 5/325 oral tablet: 1 tab(s) orally every 6 hours (17 May 2019 15:01)  pilocarpine 4% ophthalmic solution: 2 drop(s) to each affected eye 4 times a day (17 May 2019 15:01)  predniSONE 20 mg oral tablet: 1 tab(s) orally once a day (17 May 2019 15:01)  Protonix 40 mg oral delayed release tablet: 1 tab(s) orally once a day (17 May 2019 15:01)  tamsulosin 0.4 mg oral capsule: 1 cap(s) orally once a day (at bedtime) (17 May 2019 15:01)  torsemide 20 mg oral tablet: 1 tab(s) orally once a day (17 May 2019 15:01)    Patient is a 64y old  Female who presents with a chief complaint of RHC/ Cardiomems (22 May 2019 09:49)    HEALTH ISSUES - PROBLEM Dx:  Anemia: Anemia  Renal insufficiency: Renal insufficiency  Hyperkalemia: Hyperkalemia  Congestive heart failure, unspecified HF chronicity, unspecified heart failure type: Congestive heart failure, unspecified HF chronicity, unspecified heart failure type  Pulmonary hypertension: Pulmonary hypertension  CAD (coronary artery disease): CAD (coronary artery disease)  Hypertension, unspecified type: Hypertension, unspecified type  Pleural effusion: Pleural effusion  Acute pulmonary edema: Acute pulmonary edema        HPI:  64F presented with increasing dyspnea. The patient reported that the shortness of breath started yesterday and was associated with significant fatigue. As per the emergency department notes, the patient was noted to be "slow to respond" two days prior. The patient denied any associated chest pain or palpitations but admits to increased lower extremity swelling. She denied any orthopnea or increased urination. The patient denied any recent changes in her diet or medications and reports being compliant with her medications. She reports similar symptoms in the past for which she was admitted to the hospital a few months prior. She is unaware of any aggravating or relieving factors. She denied any associated fevers or chills. There was no audible wheezing. (17 May 2019 15:52)    General: No fatigue, no fevers/chills  Respiratory: No dyspnea, no cough, no wheeze  CV: No chest pain, no palpitations  Abd: No nausea  Neuro: No headache, no dizziness  Allergy Status Unknown      Objective:  Vital Signs Last 24 Hrs  T(C): 36.7 (22 May 2019 09:36), Max: 36.8 (22 May 2019 05:33)  T(F): 98.1 (22 May 2019 09:36), Max: 98.2 (22 May 2019 05:33)  HR: 73 (22 May 2019 09:36) (72 - 77)  BP: 137/60 (22 May 2019 09:36) (137/60 - 162/64)  BP(mean): --  RR: 18 (22 May 2019 09:36) (18 - 20)  SpO2: 96% (22 May 2019 09:36) (92% - 100%)    CM: SR  Neuro: A&OX3, CN 2-12 intact  HEENT: NC, AT  Lungs: CTA B/L  CV: S1, S2, no murmur, RRR  Abd: Soft  Right Groin: Soft, no bleeding, no hematoma  Extremity: + distal pulses                          8.5    5.4   )-----------( 187      ( 21 May 2019 06:18 )             25.3     05-22    140  |  100  |  30.0<H>  ----------------------------<  181<H>  4.3   |  31.0<H>  |  1.07    Ca    8.8      22 May 2019 06:10  Mg     1.8     05-22          -post cardiac cath orders  -right groin precautions  -continue current medical therapy  -DAPT (ASA and plavix); plavix will be for one month   -continue furosemide today; please resume torsemide tomorrow (home dose)  -follow up outpt in 2-3 weeks with Dr. Recio

## 2019-05-22 NOTE — CHART NOTE - NSCHARTNOTEFT_GEN_A_CORE
Right groin bleeding s/p RHC.   x20 minutes hemostasis applied and Dstat dressing applied  when patient was reevaluated the bleed was not contained  epi-lidocaine injection 20mL was administered subcutaneously and manual hemostasis was applied x 25 minutes and D STAT dressing was applied   skin in immediate area is excoriated. Sandbag was applied for extra reinforcement.   No hematoma noted.

## 2019-05-23 ENCOUNTER — TRANSCRIPTION ENCOUNTER (OUTPATIENT)
Age: 64
End: 2019-05-23

## 2019-05-23 VITALS
OXYGEN SATURATION: 98 % | SYSTOLIC BLOOD PRESSURE: 146 MMHG | HEART RATE: 78 BPM | TEMPERATURE: 98 F | RESPIRATION RATE: 17 BRPM | DIASTOLIC BLOOD PRESSURE: 69 MMHG

## 2019-05-23 DIAGNOSIS — I50.9 HEART FAILURE, UNSPECIFIED: ICD-10-CM

## 2019-05-23 LAB
ANION GAP SERPL CALC-SCNC: 9 MMOL/L — SIGNIFICANT CHANGE UP (ref 5–17)
BUN SERPL-MCNC: 25 MG/DL — HIGH (ref 8–20)
CALCIUM SERPL-MCNC: 9.1 MG/DL — SIGNIFICANT CHANGE UP (ref 8.6–10.2)
CHLORIDE SERPL-SCNC: 99 MMOL/L — SIGNIFICANT CHANGE UP (ref 98–107)
CO2 SERPL-SCNC: 31 MMOL/L — HIGH (ref 22–29)
CREAT SERPL-MCNC: 0.86 MG/DL — SIGNIFICANT CHANGE UP (ref 0.5–1.3)
GLUCOSE BLDC GLUCOMTR-MCNC: 119 MG/DL — HIGH (ref 70–99)
GLUCOSE BLDC GLUCOMTR-MCNC: 175 MG/DL — HIGH (ref 70–99)
GLUCOSE SERPL-MCNC: 157 MG/DL — HIGH (ref 70–115)
HCT VFR BLD CALC: 25.6 % — LOW (ref 37–47)
HGB BLD-MCNC: 8.6 G/DL — LOW (ref 12–16)
MCHC RBC-ENTMCNC: 29.7 PG — SIGNIFICANT CHANGE UP (ref 27–31)
MCHC RBC-ENTMCNC: 33.6 G/DL — SIGNIFICANT CHANGE UP (ref 32–36)
MCV RBC AUTO: 88.3 FL — SIGNIFICANT CHANGE UP (ref 81–99)
PLATELET # BLD AUTO: 189 K/UL — SIGNIFICANT CHANGE UP (ref 150–400)
POTASSIUM SERPL-MCNC: 4.5 MMOL/L — SIGNIFICANT CHANGE UP (ref 3.5–5.3)
POTASSIUM SERPL-SCNC: 4.5 MMOL/L — SIGNIFICANT CHANGE UP (ref 3.5–5.3)
RBC # BLD: 2.9 M/UL — LOW (ref 4.4–5.2)
RBC # FLD: 18.4 % — HIGH (ref 11–15.6)
SODIUM SERPL-SCNC: 139 MMOL/L — SIGNIFICANT CHANGE UP (ref 135–145)
WBC # BLD: 4.8 K/UL — SIGNIFICANT CHANGE UP (ref 4.8–10.8)
WBC # FLD AUTO: 4.8 K/UL — SIGNIFICANT CHANGE UP (ref 4.8–10.8)

## 2019-05-23 PROCEDURE — 87186 SC STD MICRODIL/AGAR DIL: CPT

## 2019-05-23 PROCEDURE — 99285 EMERGENCY DEPT VISIT HI MDM: CPT | Mod: 25

## 2019-05-23 PROCEDURE — 83036 HEMOGLOBIN GLYCOSYLATED A1C: CPT

## 2019-05-23 PROCEDURE — 80048 BASIC METABOLIC PNL TOTAL CA: CPT

## 2019-05-23 PROCEDURE — 96375 TX/PRO/DX INJ NEW DRUG ADDON: CPT

## 2019-05-23 PROCEDURE — 81001 URINALYSIS AUTO W/SCOPE: CPT

## 2019-05-23 PROCEDURE — 97163 PT EVAL HIGH COMPLEX 45 MIN: CPT

## 2019-05-23 PROCEDURE — 83550 IRON BINDING TEST: CPT

## 2019-05-23 PROCEDURE — 84484 ASSAY OF TROPONIN QUANT: CPT

## 2019-05-23 PROCEDURE — 99239 HOSP IP/OBS DSCHRG MGMT >30: CPT

## 2019-05-23 PROCEDURE — 94640 AIRWAY INHALATION TREATMENT: CPT

## 2019-05-23 PROCEDURE — C1769: CPT

## 2019-05-23 PROCEDURE — 83735 ASSAY OF MAGNESIUM: CPT

## 2019-05-23 PROCEDURE — C1889: CPT

## 2019-05-23 PROCEDURE — 85610 PROTHROMBIN TIME: CPT

## 2019-05-23 PROCEDURE — C1894: CPT

## 2019-05-23 PROCEDURE — 86803 HEPATITIS C AB TEST: CPT

## 2019-05-23 PROCEDURE — 83880 ASSAY OF NATRIURETIC PEPTIDE: CPT

## 2019-05-23 PROCEDURE — 70450 CT HEAD/BRAIN W/O DYE: CPT

## 2019-05-23 PROCEDURE — 80053 COMPREHEN METABOLIC PANEL: CPT

## 2019-05-23 PROCEDURE — 93005 ELECTROCARDIOGRAM TRACING: CPT

## 2019-05-23 PROCEDURE — 99232 SBSQ HOSP IP/OBS MODERATE 35: CPT

## 2019-05-23 PROCEDURE — 99152 MOD SED SAME PHYS/QHP 5/>YRS: CPT

## 2019-05-23 PROCEDURE — 96374 THER/PROPH/DIAG INJ IV PUSH: CPT

## 2019-05-23 PROCEDURE — 84466 ASSAY OF TRANSFERRIN: CPT

## 2019-05-23 PROCEDURE — 99153 MOD SED SAME PHYS/QHP EA: CPT

## 2019-05-23 PROCEDURE — 83540 ASSAY OF IRON: CPT

## 2019-05-23 PROCEDURE — 84443 ASSAY THYROID STIM HORMONE: CPT

## 2019-05-23 PROCEDURE — 82728 ASSAY OF FERRITIN: CPT

## 2019-05-23 PROCEDURE — C2624: CPT

## 2019-05-23 PROCEDURE — 85027 COMPLETE CBC AUTOMATED: CPT

## 2019-05-23 PROCEDURE — 82962 GLUCOSE BLOOD TEST: CPT

## 2019-05-23 PROCEDURE — 97116 GAIT TRAINING THERAPY: CPT

## 2019-05-23 PROCEDURE — 36415 COLL VENOUS BLD VENIPUNCTURE: CPT

## 2019-05-23 PROCEDURE — 71250 CT THORAX DX C-: CPT

## 2019-05-23 PROCEDURE — 85730 THROMBOPLASTIN TIME PARTIAL: CPT

## 2019-05-23 PROCEDURE — 84132 ASSAY OF SERUM POTASSIUM: CPT

## 2019-05-23 PROCEDURE — 87086 URINE CULTURE/COLONY COUNT: CPT

## 2019-05-23 PROCEDURE — 71045 X-RAY EXAM CHEST 1 VIEW: CPT

## 2019-05-23 RX ORDER — INSULIN GLARGINE 100 [IU]/ML
15 INJECTION, SOLUTION SUBCUTANEOUS
Qty: 0 | Refills: 0 | DISCHARGE
Start: 2019-05-23

## 2019-05-23 RX ORDER — CARVEDILOL PHOSPHATE 80 MG/1
1 CAPSULE, EXTENDED RELEASE ORAL
Qty: 0 | Refills: 0 | DISCHARGE
Start: 2019-05-23

## 2019-05-23 RX ORDER — CLOPIDOGREL BISULFATE 75 MG/1
1 TABLET, FILM COATED ORAL
Qty: 30 | Refills: 0
Start: 2019-05-23 | End: 2019-06-21

## 2019-05-23 RX ORDER — FERROUS SULFATE 325(65) MG
1 TABLET ORAL
Qty: 30 | Refills: 0
Start: 2019-05-23 | End: 2019-06-21

## 2019-05-23 RX ADMIN — ISOSORBIDE MONONITRATE 60 MILLIGRAM(S): 60 TABLET, EXTENDED RELEASE ORAL at 13:05

## 2019-05-23 RX ADMIN — GABAPENTIN 100 MILLIGRAM(S): 400 CAPSULE ORAL at 05:46

## 2019-05-23 RX ADMIN — Medication 2: at 12:59

## 2019-05-23 RX ADMIN — GABAPENTIN 100 MILLIGRAM(S): 400 CAPSULE ORAL at 13:06

## 2019-05-23 RX ADMIN — DULOXETINE HYDROCHLORIDE 60 MILLIGRAM(S): 30 CAPSULE, DELAYED RELEASE ORAL at 13:06

## 2019-05-23 RX ADMIN — Medication 81 MILLIGRAM(S): at 13:07

## 2019-05-23 RX ADMIN — Medication 20 MILLIGRAM(S): at 05:46

## 2019-05-23 RX ADMIN — CARVEDILOL PHOSPHATE 6.25 MILLIGRAM(S): 80 CAPSULE, EXTENDED RELEASE ORAL at 05:46

## 2019-05-23 RX ADMIN — PANTOPRAZOLE SODIUM 40 MILLIGRAM(S): 20 TABLET, DELAYED RELEASE ORAL at 05:46

## 2019-05-23 RX ADMIN — Medication 325 MILLIGRAM(S): at 13:06

## 2019-05-23 RX ADMIN — CLOPIDOGREL BISULFATE 75 MILLIGRAM(S): 75 TABLET, FILM COATED ORAL at 13:07

## 2019-05-23 RX ADMIN — NYSTATIN CREAM 1 APPLICATION(S): 100000 CREAM TOPICAL at 05:47

## 2019-05-23 NOTE — PROGRESS NOTE ADULT - PROBLEM SELECTOR PLAN 1
- Pt denies symptoms of acute decompensated HF at this time  - Lung exam still with rales b/l  - Continue Lasix 40mg IV BID  - Continue coreg and Imdur  - Losartan on hold for KEITH  - Strict Is and Os  - Monitor on telemetry.  Keep K > 4, Mg > 2.  Monitor renal function with ongoing diuresis.
S/p RHC - cardiomems placed successfully.   PAWP 24  Continue Torsemide, coreg, isosorbide, lipitor, ASA, Plavix.   no s/s of volume overload on exam.   2.6 kg weight loss, urine output inaccurate.   Strict I/o, daily weights   electrolytes wnl  Follow up outpatient with Dr. Recio within 2-3 weeks.
Continue diuresis with lasix bid  check cxr today if effusion persists will refer for chest tube

## 2019-05-23 NOTE — PROGRESS NOTE ADULT - SUBJECTIVE AND OBJECTIVE BOX
CC:  Patient is a 64y old  Female who presents with a chief complaint of shortness of breath (22 May 2019 17:43)    HPI:  64F presented with increasing dyspnea. The patient reported that the shortness of breath started yesterday and was associated with significant fatigue. As per the emergency department notes, the patient was noted to be "slow to respond" two days prior. The patient denied any associated chest pain or palpitations but admits to increased lower extremity swelling. She denied any orthopnea or increased urination. The patient denied any recent changes in her diet or medications and reports being compliant with her medications. She reports similar symptoms in the past for which she was admitted to the hospital a few months prior. She is unaware of any aggravating or relieving factors. She denied any associated fevers or chills. There was no audible wheezing. (17 May 2019 15:52)    ROS: All review of systems negative unless indicated otherwise below.     Lab Results:  CBC  CBC Full  -  ( 23 May 2019 05:40 )  WBC Count : 4.8 K/uL  RBC Count : 2.90 M/uL  Hemoglobin : 8.6 g/dL  Hematocrit : 25.6 %  Platelet Count - Automated : 189 K/uL  Mean Cell Volume : 88.3 fl  Mean Cell Hemoglobin : 29.7 pg  Mean Cell Hemoglobin Concentration : 33.6 g/dL  Auto Neutrophil # : x  Auto Lymphocyte # : x  Auto Monocyte # : x  Auto Eosinophil # : x  Auto Basophil # : x  Auto Neutrophil % : x  Auto Lymphocyte % : x  Auto Monocyte % : x  Auto Eosinophil % : x  Auto Basophil % : x                     8.6    4.8   )-----------( 189      ( 23 May 2019 05:40 )             25.6     05-23    139  |  99  |  25.0<H>  ----------------------------<  157<H>  4.5   |  31.0<H>  |  0.86    Ca    9.1      23 May 2019 05:40  Mg     1.8     05-22    MICROBIOLOGY/CULTURES:  Culture Results:   >100,000 CFU/ml Escherichia coli (05-17 @ 12:37)    CATH REPORT: Preliminary Verbal Report  5/22/19 RHC - cardiomems placed succesfully - PAWP 24    MEDICATIONS  (STANDING):  aspirin enteric coated 81 milliGRAM(s) Oral daily  atorvastatin 80 milliGRAM(s) Oral at bedtime  carvedilol 6.25 milliGRAM(s) Oral every 12 hours  clopidogrel Tablet 75 milliGRAM(s) Oral daily  dextrose 5%. 1000 milliLiter(s) IV Continuous <Continuous>  dextrose 50% Injectable 12.5 Gram(s) IV Push once  dextrose 50% Injectable 25 Gram(s) IV Push once  dextrose 50% Injectable 25 Gram(s) IV Push once  DULoxetine 60 milliGRAM(s) Oral daily  ferrous    sulfate 325 milliGRAM(s) Oral daily  gabapentin 100 milliGRAM(s) Oral three times a day  insulin glargine Injectable (LANTUS) 15 Unit(s) SubCutaneous at bedtime  insulin lispro (HumaLOG) corrective regimen sliding scale   SubCutaneous three times a day before meals  isosorbide   mononitrate ER Tablet (IMDUR) 60 milliGRAM(s) Oral daily  melatonin 3 milliGRAM(s) Oral at bedtime  nystatin Powder 1 Application(s) Topical two times a day  pantoprazole    Tablet 40 milliGRAM(s) Oral before breakfast  tamsulosin 0.4 milliGRAM(s) Oral at bedtime  torsemide 20 milliGRAM(s) Oral daily    MEDICATIONS  (PRN):  dextrose 40% Gel 15 Gram(s) Oral once PRN Blood Glucose LESS THAN 70 milliGRAM(s)/deciliter  glucagon  Injectable 1 milliGRAM(s) IntraMuscular once PRN Glucose LESS THAN 70 milligrams/deciliter  sodium chloride 0.65% Nasal 1 Spray(s) Both Nostrils four times a day PRN Nasal Congestion or dry nares      PHYSICAL EXAM:  Vital Signs Last 24 Hrs  T(C): 37.1 (23 May 2019 05:33), Max: 37.1 (23 May 2019 05:33)  T(F): 98.7 (23 May 2019 05:33), Max: 98.7 (23 May 2019 05:33)  HR: 81 (23 May 2019 05:33) (63 - 84)  BP: 131/58 (23 May 2019 05:33) (112/57 - 161/78)  BP(mean): --  RR: 17 (23 May 2019 05:33) (17 - 18)  SpO2: 96% (23 May 2019 05:33) (95% - 100%)  GENERAL: NAD, well-groomed, well-developed  HEAD:  Atraumatic, Normocephalic  NECK: Supple, No JVD  NERVOUS SYSTEM:  Alert & Oriented X3, Good concentration; Motor Strength 5/5 B/L upper and lower extremities, sensation intact  CHEST/LUNG: Clear to auscultation bilaterally, No rales, rhonchi, wheezing, or rubs  HEART: Regular rate and rhythm; s1 and s2 auscultated, No murmurs, rubs, or gallops  ABDOMEN: Soft, Nontender, Nondistended; Bowel sounds present and normoactive.   EXTREMITIES:  2+ Peripheral Pulses, No clubbing, cyanosis, or edema  SKIN: No rashes or lesions  CATH SITE: Right groin benign s/p cath.  No bleeding, no ecchymosis, no hematoma. Extremity Warm to touch, with palpable distal pulses, and brisk capillary refill.

## 2019-05-23 NOTE — DISCHARGE NOTE PROVIDER - HOSPITAL COURSE
64F with a history of CABG, Diastolic HF, diabetes, GERD with prior GI bleed, peripheral vascular disease and neuropathy presented with increased dyspnea and fatigue. She was found to have hypoxia (88% on ambient air), lower extremity edema, and chest Xray consistent with pulmonary edema. Laboratory studies also noted acute kidney injury and hyperkalemia.    Losartan held, underwent IV diuresis with improvement and evaluated by cardiology.    underwent right heart catheterization and received cardiomem placement.    cardiology advised plavix therapy for 1 month in addition to aspirin.  seen by ct surgery and deemed no significant right pleural effusion for drainage.        cleared by cardiology for discharge.    patient requires home o2 as o2 sat at rest oxygen sat 78%,on RA. o2 2lpm sat at 99%        previous MR#[id number]            dc planning 45 minutes.

## 2019-05-23 NOTE — PROGRESS NOTE ADULT - ASSESSMENT
64F presented with increasing dyspnea. The patient reported that the shortness of breath started yesterday and was associated with significant fatigue. As per the emergency department notes, the patient was noted to be "slow to respond" two days prior. The patient denied any associated chest pain or palpitations but admits to increased lower extremity swelling. She denied any orthopnea or increased urination. The patient denied any recent changes in her diet or medications and reports being compliant with her medications. She reports similar symptoms in the past for which she was admitted to the hospital a few months prior. She is unaware of any aggravating or relieving factors. She denied any associated fevers or chills. There was no audible wheezing.  now s/p right heart catheterization/ Cardiomems placement via right groin approach with Dr. Recio which showed:  PAWP 24 Cardiomems placed successfully.

## 2019-05-23 NOTE — DISCHARGE NOTE PROVIDER - NSDCHHNEEDSERVICE_GEN_ALL_CORE
Rehabilitation services/Teaching and training/Medication teaching and assessment/Observation and assessment

## 2019-05-23 NOTE — DISCHARGE NOTE PROVIDER - NSDCCPCAREPLAN_GEN_ALL_CORE_FT
PRINCIPAL DISCHARGE DIAGNOSIS  Diagnosis: Acute on chronic diastolic heart failure  Assessment and Plan of Treatment: improved after diuresis.  continue torsemide.  follow up with cardiology in 1 week.   take plavix (clopidogrel) for 1 month.      SECONDARY DISCHARGE DIAGNOSES  Diagnosis: Failure respiratory  Assessment and Plan of Treatment: acute hypoxic resp failure.  Home oxygen supplementation required.    Diagnosis: Hyperkalemia  Assessment and Plan of Treatment: resolved    Diagnosis: KEITH (acute kidney injury)  Assessment and Plan of Treatment: resolved    Diagnosis: Pleural effusion  Assessment and Plan of Treatment: improved. no need for drainage per CT surgery

## 2019-05-23 NOTE — DISCHARGE NOTE NURSING/CASE MANAGEMENT/SOCIAL WORK - NSDCDPATPORTLINK_GEN_ALL_CORE
You can access the [facility] Patient Portal, offered by [facility], by registering with the following website: [email or web address]

## 2019-05-23 NOTE — PROGRESS NOTE ADULT - PROVIDER SPECIALTY LIST ADULT
Cardiology
Hospitalist
Intervent Cardiology
Cardiology
Hospitalist

## 2019-05-23 NOTE — PROGRESS NOTE ADULT - PROBLEM SELECTOR PROBLEM 1
Congestive heart failure, unspecified HF chronicity, unspecified heart failure type
Heart failure
Congestive heart failure, unspecified HF chronicity, unspecified heart failure type

## 2019-05-23 NOTE — DISCHARGE NOTE PROVIDER - CARE PROVIDER_API CALL
[place])  Cardiovascular Disease; Interventional Cardiology  [address] [facility], [address] [place] [address]  Phone: [phone]  Fax: [phone]  Follow Up Time:

## 2019-05-24 RX ORDER — CARVEDILOL PHOSPHATE 80 MG/1
1 CAPSULE, EXTENDED RELEASE ORAL
Qty: 0 | Refills: 0 | DISCHARGE

## 2019-05-24 RX ORDER — LOSARTAN POTASSIUM 100 MG/1
1 TABLET, FILM COATED ORAL
Qty: 0 | Refills: 0 | DISCHARGE

## 2019-05-24 RX ORDER — FUROSEMIDE 40 MG
1 TABLET ORAL
Qty: 0 | Refills: 0 | DISCHARGE

## 2019-05-24 RX ORDER — AMLODIPINE BESYLATE 2.5 MG/1
2 TABLET ORAL
Qty: 0 | Refills: 0 | DISCHARGE

## 2019-05-29 ENCOUNTER — INPATIENT (INPATIENT)
Facility: HOSPITAL | Age: 64
LOS: 4 days | Discharge: HOME | DRG: 378 | End: 2019-06-03
Attending: HOSPITALIST | Admitting: HOSPITALIST
Payer: MEDICARE

## 2019-05-29 VITALS
HEART RATE: 79 BPM | SYSTOLIC BLOOD PRESSURE: 139 MMHG | DIASTOLIC BLOOD PRESSURE: 79 MMHG | RESPIRATION RATE: 18 BRPM | OXYGEN SATURATION: 100 % | TEMPERATURE: 98 F

## 2019-05-29 DIAGNOSIS — K62.5 HEMORRHAGE OF ANUS AND RECTUM: ICD-10-CM

## 2019-05-29 DIAGNOSIS — K92.2 GASTROINTESTINAL HEMORRHAGE, UNSPECIFIED: ICD-10-CM

## 2019-05-29 PROBLEM — H40.9 UNSPECIFIED GLAUCOMA: Chronic | Status: ACTIVE | Noted: 2019-05-17

## 2019-05-29 PROBLEM — G62.9 POLYNEUROPATHY, UNSPECIFIED: Chronic | Status: ACTIVE | Noted: 2019-05-17

## 2019-05-29 PROBLEM — H33.20 SEROUS RETINAL DETACHMENT, UNSPECIFIED EYE: Chronic | Status: ACTIVE | Noted: 2019-05-17

## 2019-05-29 PROBLEM — I50.9 HEART FAILURE, UNSPECIFIED: Chronic | Status: ACTIVE | Noted: 2019-05-17

## 2019-05-29 PROBLEM — G47.00 INSOMNIA, UNSPECIFIED: Chronic | Status: ACTIVE | Noted: 2019-05-17

## 2019-05-29 PROBLEM — K21.9 GASTRO-ESOPHAGEAL REFLUX DISEASE WITHOUT ESOPHAGITIS: Chronic | Status: ACTIVE | Noted: 2019-05-17

## 2019-05-29 PROBLEM — E11.9 TYPE 2 DIABETES MELLITUS WITHOUT COMPLICATIONS: Chronic | Status: ACTIVE | Noted: 2019-05-17

## 2019-05-29 PROBLEM — R33.9 RETENTION OF URINE, UNSPECIFIED: Chronic | Status: ACTIVE | Noted: 2019-05-17

## 2019-05-29 PROBLEM — F32.9 MAJOR DEPRESSIVE DISORDER, SINGLE EPISODE, UNSPECIFIED: Chronic | Status: ACTIVE | Noted: 2019-05-17

## 2019-05-29 PROBLEM — I10 ESSENTIAL (PRIMARY) HYPERTENSION: Chronic | Status: ACTIVE | Noted: 2019-05-17

## 2019-05-29 PROBLEM — I25.10 ATHEROSCLEROTIC HEART DISEASE OF NATIVE CORONARY ARTERY WITHOUT ANGINA PECTORIS: Chronic | Status: ACTIVE | Noted: 2019-05-17

## 2019-05-29 LAB
ALBUMIN SERPL ELPH-MCNC: 3.2 G/DL — LOW (ref 3.3–5.2)
ALP SERPL-CCNC: 187 U/L — HIGH (ref 40–120)
ALT FLD-CCNC: 51 U/L — HIGH
ANION GAP SERPL CALC-SCNC: 11 MMOL/L — SIGNIFICANT CHANGE UP (ref 5–17)
APTT BLD: 31.4 SEC — SIGNIFICANT CHANGE UP (ref 27.5–36.3)
AST SERPL-CCNC: 40 U/L — HIGH
BASOPHILS # BLD AUTO: 0 K/UL — SIGNIFICANT CHANGE UP (ref 0–0.2)
BASOPHILS NFR BLD AUTO: 0.5 % — SIGNIFICANT CHANGE UP (ref 0–2)
BILIRUB SERPL-MCNC: 0.6 MG/DL — SIGNIFICANT CHANGE UP (ref 0.4–2)
BUN SERPL-MCNC: 52 MG/DL — HIGH (ref 8–20)
CALCIUM SERPL-MCNC: 8.7 MG/DL — SIGNIFICANT CHANGE UP (ref 8.6–10.2)
CHLORIDE SERPL-SCNC: 96 MMOL/L — LOW (ref 98–107)
CO2 SERPL-SCNC: 30 MMOL/L — HIGH (ref 22–29)
CREAT SERPL-MCNC: 1.65 MG/DL — HIGH (ref 0.5–1.3)
EOSINOPHIL # BLD AUTO: 0.2 K/UL — SIGNIFICANT CHANGE UP (ref 0–0.5)
EOSINOPHIL NFR BLD AUTO: 3.2 % — SIGNIFICANT CHANGE UP (ref 0–6)
FERRITIN SERPL-MCNC: 245 NG/ML — HIGH (ref 15–150)
GLUCOSE BLDC GLUCOMTR-MCNC: 84 MG/DL — SIGNIFICANT CHANGE UP (ref 70–99)
GLUCOSE SERPL-MCNC: 176 MG/DL — HIGH (ref 70–115)
HCT VFR BLD CALC: 22.9 % — LOW (ref 37–47)
HGB BLD-MCNC: 7.6 G/DL — LOW (ref 12–16)
INR BLD: 1.24 RATIO — HIGH (ref 0.88–1.16)
IRON SATN MFR SERPL: 14 % — SIGNIFICANT CHANGE UP (ref 14–50)
IRON SATN MFR SERPL: 47 UG/DL — SIGNIFICANT CHANGE UP (ref 37–145)
LYMPHOCYTES # BLD AUTO: 1.5 K/UL — SIGNIFICANT CHANGE UP (ref 1–4.8)
LYMPHOCYTES # BLD AUTO: 19.7 % — LOW (ref 20–55)
MCHC RBC-ENTMCNC: 29 PG — SIGNIFICANT CHANGE UP (ref 27–31)
MCHC RBC-ENTMCNC: 33.2 G/DL — SIGNIFICANT CHANGE UP (ref 32–36)
MCV RBC AUTO: 87.4 FL — SIGNIFICANT CHANGE UP (ref 81–99)
MONOCYTES # BLD AUTO: 0.7 K/UL — SIGNIFICANT CHANGE UP (ref 0–0.8)
MONOCYTES NFR BLD AUTO: 9.5 % — SIGNIFICANT CHANGE UP (ref 3–10)
NEUTROPHILS # BLD AUTO: 5.1 K/UL — SIGNIFICANT CHANGE UP (ref 1.8–8)
NEUTROPHILS NFR BLD AUTO: 67 % — SIGNIFICANT CHANGE UP (ref 37–73)
OB PNL STL: POSITIVE
PLATELET # BLD AUTO: 271 K/UL — SIGNIFICANT CHANGE UP (ref 150–400)
POTASSIUM SERPL-MCNC: 6.1 MMOL/L — CRITICAL HIGH (ref 3.5–5.3)
POTASSIUM SERPL-SCNC: 6.1 MMOL/L — CRITICAL HIGH (ref 3.5–5.3)
PROT SERPL-MCNC: 7 G/DL — SIGNIFICANT CHANGE UP (ref 6.6–8.7)
PROTHROM AB SERPL-ACNC: 14.3 SEC — HIGH (ref 10–12.9)
RBC # BLD: 2.62 M/UL — LOW (ref 4.4–5.2)
RBC # FLD: 17.7 % — HIGH (ref 11–15.6)
SODIUM SERPL-SCNC: 137 MMOL/L — SIGNIFICANT CHANGE UP (ref 135–145)
TIBC SERPL-MCNC: 326 UG/DL — SIGNIFICANT CHANGE UP (ref 220–430)
TRANSFERRIN SERPL-MCNC: 228 MG/DL — SIGNIFICANT CHANGE UP (ref 192–382)
TYPE + AB SCN PNL BLD: SIGNIFICANT CHANGE UP
VIT B12 SERPL-MCNC: 1451 PG/ML — HIGH (ref 232–1245)
WBC # BLD: 7.6 K/UL — SIGNIFICANT CHANGE UP (ref 4.8–10.8)
WBC # FLD AUTO: 7.6 K/UL — SIGNIFICANT CHANGE UP (ref 4.8–10.8)

## 2019-05-29 PROCEDURE — 99291 CRITICAL CARE FIRST HOUR: CPT

## 2019-05-29 PROCEDURE — 76770 US EXAM ABDO BACK WALL COMP: CPT | Mod: 26

## 2019-05-29 PROCEDURE — 93010 ELECTROCARDIOGRAM REPORT: CPT

## 2019-05-29 PROCEDURE — 74176 CT ABD & PELVIS W/O CONTRAST: CPT | Mod: 26

## 2019-05-29 PROCEDURE — 99223 1ST HOSP IP/OBS HIGH 75: CPT

## 2019-05-29 RX ORDER — GABAPENTIN 400 MG/1
100 CAPSULE ORAL THREE TIMES A DAY
Refills: 0 | Status: DISCONTINUED | OUTPATIENT
Start: 2019-05-29 | End: 2019-06-03

## 2019-05-29 RX ORDER — SODIUM CHLORIDE 9 MG/ML
500 INJECTION INTRAMUSCULAR; INTRAVENOUS; SUBCUTANEOUS ONCE
Refills: 0 | Status: COMPLETED | OUTPATIENT
Start: 2019-05-29 | End: 2019-05-29

## 2019-05-29 RX ORDER — LATANOPROST 0.05 MG/ML
1 SOLUTION/ DROPS OPHTHALMIC; TOPICAL AT BEDTIME
Refills: 0 | Status: DISCONTINUED | OUTPATIENT
Start: 2019-05-29 | End: 2019-06-03

## 2019-05-29 RX ORDER — DEXTROSE 50 % IN WATER 50 %
25 SYRINGE (ML) INTRAVENOUS ONCE
Refills: 0 | Status: DISCONTINUED | OUTPATIENT
Start: 2019-05-29 | End: 2019-06-03

## 2019-05-29 RX ORDER — INSULIN LISPRO 100/ML
VIAL (ML) SUBCUTANEOUS
Refills: 0 | Status: DISCONTINUED | OUTPATIENT
Start: 2019-05-29 | End: 2019-06-03

## 2019-05-29 RX ORDER — PILOCARPINE HCL 4 %
1 DROPS OPHTHALMIC (EYE)
Refills: 0 | Status: DISCONTINUED | OUTPATIENT
Start: 2019-05-29 | End: 2019-06-03

## 2019-05-29 RX ORDER — DULOXETINE HYDROCHLORIDE 30 MG/1
60 CAPSULE, DELAYED RELEASE ORAL DAILY
Refills: 0 | Status: DISCONTINUED | OUTPATIENT
Start: 2019-05-29 | End: 2019-06-03

## 2019-05-29 RX ORDER — GABAPENTIN 400 MG/1
1 CAPSULE ORAL
Qty: 0 | Refills: 0 | DISCHARGE

## 2019-05-29 RX ORDER — ASCORBIC ACID 60 MG
500 TABLET,CHEWABLE ORAL DAILY
Refills: 0 | Status: DISCONTINUED | OUTPATIENT
Start: 2019-05-29 | End: 2019-06-03

## 2019-05-29 RX ORDER — INSULIN HUMAN 100 [IU]/ML
10 INJECTION, SOLUTION SUBCUTANEOUS ONCE
Refills: 0 | Status: COMPLETED | OUTPATIENT
Start: 2019-05-29 | End: 2019-05-29

## 2019-05-29 RX ORDER — DEXTROSE 50 % IN WATER 50 %
12.5 SYRINGE (ML) INTRAVENOUS ONCE
Refills: 0 | Status: DISCONTINUED | OUTPATIENT
Start: 2019-05-29 | End: 2019-06-03

## 2019-05-29 RX ORDER — FUROSEMIDE 40 MG
10 TABLET ORAL
Qty: 500 | Refills: 0 | Status: DISCONTINUED | OUTPATIENT
Start: 2019-05-29 | End: 2019-05-30

## 2019-05-29 RX ORDER — DEXTROSE 50 % IN WATER 50 %
15 SYRINGE (ML) INTRAVENOUS ONCE
Refills: 0 | Status: DISCONTINUED | OUTPATIENT
Start: 2019-05-29 | End: 2019-06-03

## 2019-05-29 RX ORDER — CARVEDILOL PHOSPHATE 80 MG/1
6.25 CAPSULE, EXTENDED RELEASE ORAL EVERY 12 HOURS
Refills: 0 | Status: DISCONTINUED | OUTPATIENT
Start: 2019-05-29 | End: 2019-06-03

## 2019-05-29 RX ORDER — INSULIN GLARGINE 100 [IU]/ML
15 INJECTION, SOLUTION SUBCUTANEOUS AT BEDTIME
Refills: 0 | Status: DISCONTINUED | OUTPATIENT
Start: 2019-05-29 | End: 2019-06-03

## 2019-05-29 RX ORDER — LOSARTAN POTASSIUM 100 MG/1
25 TABLET, FILM COATED ORAL DAILY
Refills: 0 | Status: DISCONTINUED | OUTPATIENT
Start: 2019-05-29 | End: 2019-06-03

## 2019-05-29 RX ORDER — ALBUTEROL 90 UG/1
2.5 AEROSOL, METERED ORAL ONCE
Refills: 0 | Status: COMPLETED | OUTPATIENT
Start: 2019-05-29 | End: 2019-05-29

## 2019-05-29 RX ORDER — ASPIRIN/CALCIUM CARB/MAGNESIUM 324 MG
1 TABLET ORAL
Qty: 0 | Refills: 0 | DISCHARGE

## 2019-05-29 RX ORDER — GLUCAGON INJECTION, SOLUTION 0.5 MG/.1ML
1 INJECTION, SOLUTION SUBCUTANEOUS ONCE
Refills: 0 | Status: DISCONTINUED | OUTPATIENT
Start: 2019-05-29 | End: 2019-06-03

## 2019-05-29 RX ORDER — ASPIRIN/CALCIUM CARB/MAGNESIUM 324 MG
60 TABLET ORAL DAILY
Refills: 0 | Status: DISCONTINUED | OUTPATIENT
Start: 2019-05-29 | End: 2019-06-03

## 2019-05-29 RX ORDER — SODIUM CHLORIDE 9 MG/ML
1000 INJECTION, SOLUTION INTRAVENOUS
Refills: 0 | Status: DISCONTINUED | OUTPATIENT
Start: 2019-05-29 | End: 2019-06-03

## 2019-05-29 RX ORDER — AMLODIPINE BESYLATE 2.5 MG/1
5 TABLET ORAL DAILY
Refills: 0 | Status: DISCONTINUED | OUTPATIENT
Start: 2019-05-29 | End: 2019-06-03

## 2019-05-29 RX ORDER — TAMSULOSIN HYDROCHLORIDE 0.4 MG/1
1 CAPSULE ORAL
Qty: 0 | Refills: 0 | DISCHARGE

## 2019-05-29 RX ORDER — MAGNESIUM OXIDE 400 MG ORAL TABLET 241.3 MG
1 TABLET ORAL
Qty: 0 | Refills: 0 | DISCHARGE

## 2019-05-29 RX ORDER — DEXTROSE 50 % IN WATER 50 %
25 SYRINGE (ML) INTRAVENOUS ONCE
Refills: 0 | Status: COMPLETED | OUTPATIENT
Start: 2019-05-29 | End: 2019-05-29

## 2019-05-29 RX ORDER — IPRATROPIUM/ALBUTEROL SULFATE 18-103MCG
3 AEROSOL WITH ADAPTER (GRAM) INHALATION EVERY 6 HOURS
Refills: 0 | Status: DISCONTINUED | OUTPATIENT
Start: 2019-05-29 | End: 2019-05-30

## 2019-05-29 RX ORDER — SODIUM BICARBONATE 1 MEQ/ML
50 SYRINGE (ML) INTRAVENOUS ONCE
Refills: 0 | Status: COMPLETED | OUTPATIENT
Start: 2019-05-29 | End: 2019-05-29

## 2019-05-29 RX ORDER — SODIUM CHLORIDE 9 MG/ML
1000 INJECTION INTRAMUSCULAR; INTRAVENOUS; SUBCUTANEOUS
Refills: 0 | Status: DISCONTINUED | OUTPATIENT
Start: 2019-05-29 | End: 2019-05-29

## 2019-05-29 RX ADMIN — SODIUM CHLORIDE 500 MILLILITER(S): 9 INJECTION INTRAMUSCULAR; INTRAVENOUS; SUBCUTANEOUS at 15:36

## 2019-05-29 RX ADMIN — ALBUTEROL 2.5 MILLIGRAM(S): 90 AEROSOL, METERED ORAL at 15:35

## 2019-05-29 RX ADMIN — Medication 25 GRAM(S): at 15:44

## 2019-05-29 RX ADMIN — GABAPENTIN 100 MILLIGRAM(S): 400 CAPSULE ORAL at 21:59

## 2019-05-29 RX ADMIN — Medication 5 MG/HR: at 17:19

## 2019-05-29 RX ADMIN — Medication 3 MILLILITER(S): at 20:47

## 2019-05-29 RX ADMIN — INSULIN HUMAN 10 UNIT(S): 100 INJECTION, SOLUTION SUBCUTANEOUS at 15:44

## 2019-05-29 RX ADMIN — SODIUM CHLORIDE 500 MILLILITER(S): 9 INJECTION INTRAMUSCULAR; INTRAVENOUS; SUBCUTANEOUS at 16:36

## 2019-05-29 RX ADMIN — Medication 50 MILLIEQUIVALENT(S): at 15:44

## 2019-05-29 RX ADMIN — LATANOPROST 1 DROP(S): 0.05 SOLUTION/ DROPS OPHTHALMIC; TOPICAL at 21:59

## 2019-05-29 NOTE — H&P ADULT - NSHPLABSRESULTS_GEN_ALL_CORE
7.6    7.6   )-----------( 271      ( 29 May 2019 13:52 )             22.9   05-29    137  |  96<L>  |  52.0<H>  ----------------------------<  176<H>  6.1<HH>   |  30.0<H>  |  1.65<H>    Ca    8.7      29 May 2019 13:52    TPro  7.0  /  Alb  3.2<L>  /  TBili  0.6  /  DBili  x   /  AST  40<H>  /  ALT  51<H>  /  AlkPhos  187<H>  05-29

## 2019-05-29 NOTE — H&P ADULT - RS GEN PE MLT RESP DETAILS PC
breath sounds equal/good air movement/respirations non-labored/diminished breath sounds, R/clear to auscultation bilaterally/airway patent

## 2019-05-29 NOTE — ED PROVIDER NOTE - DISPOSITION TYPE
Nursing Note  Yarelis Penny presents today to [facility] for: No chief complaint on file.    During today's [facility] appointment, orders from Dr. Sophy Mckeon were completed.  Frequency: monthly    Progress note:  Patient identification verified by name and date of birth.  Assessment completed.  Vitals recorded in Doc Flowsheets.  Patient was provided with education regarding infusion and possible side effects.  Patient verbalized understanding.      needed: No  Premedications: were not ordered.  Infusion Rates: 560 ml/hr.  Approximate Infusion length:30 minutes.   Labs: were not ordered for this appointment.  Vascular access: peripheral IV placed today.  Treatment Conditions:   ~~~ NOTE: If the patient answers yes to any of the questions below, hold the infusion and contact ordering provider or on-call provider.    1. Have you recently had an elevated temperature, fever, chills, productive cough, coughing for 3 weeks or longer or hemoptysis,  abnormal vital signs, night sweats,  chest pain or have you noticed a decrease in your appetite, unexplained weight loss or fatigue? No  2. Do you have any open wounds or new incisions? No  3. Do you have any recent or upcoming hospitalizations, surgeries or dental procedures? No  4. Do you currently have or recently have had any signs of illness or infection or are you on any antibiotics? No  5. Have you had any new, sudden or worsening abdominal pain? No  6. Have you or anyone in your household received a live vaccination in the past 4 weeks? Please note:  No live vaccines while on biologic/chemotherapy until 6 months after the last treatment.  Patient can receive the flu vaccine (shot only) and the pneumovax.  It is optimal for the patient to get these vaccines mid cycle, but they can be given at any time as long as it is not on the day of the infusion. No  7. Have you recently been diagnosed with any  new nervous system diseases (ie. Multiple sclerosis, Guillain [place], seizures, neurological changes) or cancer diagnosis? Are you on any form of radiation or chemotherapy? No  8. Are you pregnant or breast feeding or do you have plans of pregnancy in the future? No  9. Have you been having any signs of worsening depression or suicidal ideations?  (benlysta only) No  10. Have there been any other new onset medical symptoms? No    Patient tolerated infusion: well.    Drug Waste Record? No     Discharge Plan:   Follow up plan of care with: ongoing infusions at [facility]. and primary medical doctor.  Discharge instructions were reviewed with patient.  Patient/representative verbalized understanding of discharge instructions and all questions answered.  Patient discharged from [facility] in stable condition.    Avis Gregg RN       Administrations This Visit     vedolizumab (ENTYVIO) 300 mg in sodium chloride 0.9 % 280 mL infusion     Admin Date Action Dose Rate Route Administered By          11/27/2018 New Bag 300 mg 560 mL/hr Intravenous Avis Gregg RN                           /81  Pulse 79  Temp 97.6  F (36.4  C) (Oral)  SpO2 99%     ADMIT

## 2019-05-29 NOTE — ED PROVIDER NOTE - CARE PLAN
Principal Discharge DX:	GI bleed  Secondary Diagnosis:	Hyperkalemia  Secondary Diagnosis:	Acute renal failure

## 2019-05-29 NOTE — CONSULT NOTE ADULT - SUBJECTIVE AND OBJECTIVE BOX
Patient is a 64y old  Female who presents with a chief complaint of rectal bleed (29 May 2019 16:16)      HPI:  63 yo female with multiple medical problems recently started on aspirin /plavix presented to the ED with c/o red blood from rectum . Hx of CAD, CABG, PTCA, PAD- angioplasty, DM HTN, CHF. Admitted last week with CHF.      Poor historian- leagally blind.  State she had one normal BM this am, then had several Bm with blood. NO abdominal pain . No diarrhea, no constipation, no nausea, no vomiting. N OCP, no SOB.  Patient with GI Bleed in 2019. EGD hsoed duodenitis, antral gastritis. Colonoscopy in 2019 showed left sided diverticulosis. No masses.      This admission, pt has creatitin 1.6, incresaed K- 6.0, INR 1.2, hemoglobin 7.6, CT showing loculated right pleural effusion ( had thoracentesis).     REVIEW OF SYSTEMS:  Constitutional: No fever, weight loss or fatigue  ENMT:  No difficulty hearing, tinnitus, vertigo; No sinus or throat pain  Respiratory: No cough, wheezing, chills or hemoptysis  Cardiovascular: No chest pain, palpitations, dizziness or leg swelling  Gastrointestinal: No abdominal or epigastric pain. No nausea, vomiting or hematemesis; No diarrhea or constipation. + hematochezia.  Skin: No itching, burning, rashes or lesions   Musculoskeletal: No joint pain or swelling; No muscle, back or extremity pain    PAST MEDICAL & SURGICAL HISTORY:  Congestive heart failure, unspecified HF chronicity, unspecified heart failure type  Insomnia  Retinal detachment  CAD (coronary artery disease)  Glaucoma  Urinary retention  Peripheral neuropathy  GERD (gastroesophageal reflux disease)  Depression  Diabetes  Hypertension, unspecified type  Herniated disc, cervical  PAD (peripheral artery disease)  Legally blind  History of peripheral vascular disease  History of retinal detachment  History of CEA (carotid endarterectomy): Right  Hyperlipidemia  Glaucoma  Hypertension  Diabetes  No significant past surgical history  S/P CABG x 1  After cataract  Uveitic glaucoma of both eyes  Detached retina  Atherosclerosis of right carotid artery   delivery delivered      FAMILY HISTORY:  No pertinent family history in first degree relatives      SOCIAL HISTORY:  Smoking Status: [ ] Current, [ ] Former, [ ] Never  Pack Years:  [  ] EtOH-no  [  ] IVDA-no    MEDICATIONS:  MEDICATIONS  (STANDING):  carvedilol 6.25 milliGRAM(s) Oral every 12 hours  dextrose 5%. 1000 milliLiter(s) (50 mL/Hr) IV Continuous <Continuous>  dextrose 50% Injectable 12.5 Gram(s) IV Push once  dextrose 50% Injectable 25 Gram(s) IV Push once  dextrose 50% Injectable 25 Gram(s) IV Push once  furosemide Infusion 10 mG/Hr (5 mL/Hr) IV Continuous <Continuous>  insulin lispro (HumaLOG) corrective regimen sliding scale   SubCutaneous three times a day before meals  losartan 25 milliGRAM(s) Oral daily    MEDICATIONS  (PRN):  dextrose 40% Gel 15 Gram(s) Oral once PRN Blood Glucose LESS THAN 70 milliGRAM(s)/deciliter  glucagon  Injectable 1 milliGRAM(s) IntraMuscular once PRN Glucose LESS THAN 70 milligrams/deciliter      Allergies    Allergy Status Unknown  No Known Allergies    Intolerances        Vital Signs Last 24 Hrs  T(C): 36.9 (29 May 2019 12:23), Max: 36.9 (29 May 2019 12:23)  T(F): 98.4 (29 May 2019 12:23), Max: 98.4 (29 May 2019 12:23)  HR: 80 (29 May 2019 16:00) (79 - 80)  BP: 146/72 (29 May 2019 16:00) (139/79 - 146/72)  BP(mean): --  RR: 19 (29 May 2019 16:00) (18 - 19)  SpO2: 100% (29 May 2019 16:00) (76% - 100%)        PHYSICAL EXAM:    General: Well developed; well nourished; in no acute distress  HEENT: MMM, conjunctiva and sclera clear  H- RRR  L- CTA  Gastrointestinal: Soft, non-tender non-distended; Normal bowel sounds; No rebound or guarding  Extremities: Normal range of motion, No clubbing, cyanosis or edema  Neurological: Alert and oriented x3  Skin: Warm and dry. No obvious rash  rectal- dark clotted blood      LABS:                        7.6    7.6   )-----------( 271      ( 29 May 2019 13:52 )             22.9     29 May 2019 13:52    137    |  96     |  52.0   ----------------------------<  176    6.1     |  30.0   |  1.65     Ca    8.7        29 May 2019 13:52    TPro  7.0    /  Alb  3.2    /  TBili  0.6    /  DBili  x      /  AST  40     /  ALT  51     /  AlkPhos  187    / Amylase x      /Lipase x      29 May 2019 13:52              RADIOLOGY & ADDITIONAL STUDIES:     < from: CT Chest No Cont (19 @ 17:07) >  MPRESSION:     Moderate loculated right pleural effusion and trace left pleural effusion.    Faint groundglass opacities compatible mild pulmonary vascular congestion.    Questionable heterogeneity at the dome of the liver, although evaluation   is limited due to streak artifact. Small upper abdominal ascites.   Consider further evaluation with nonemergent CT of the abdomen pelvis.    Additional findings as above.    < end of copied text >
Patient is a 64y old  Female who presents with a chief complaint of     HPI:  65 YO  AA F presented with increasing dyspnea. The patient reported that the shortness of breath started yesterday and was associated with significant fatigue. As per the emergency department notes, the patient was noted to be "slow to respond" two days prior. The patient denied any associated chest pain or palpitations but admits to increased lower extremity swelling. She denied any orthopnea or increased urination. The patient denied any recent changes in her diet or medications and reports being compliant with her medications. She reports similar symptoms in the past for which she was admitted to the hospital a few months prior. She is unaware of any aggravating or relieving factors. She denied any associated fevers or chills. There was no audible wheezing.    Now w. GI Bleed, Hyperkalemia & KEITH,    PAST MEDICAL & SURGICAL HISTORY:  Congestive heart failure, unspecified HF chronicity, unspecified heart failure type  Retinal detachment  CAD (coronary artery disease)  Urinary retention  Peripheral neuropathy  Diabetes  Hypertension, unspecified type  Herniated disc, cervical  PAD (peripheral artery disease)  Legally blind  History of peripheral vascular disease  History of retinal detachment  History of CEA (carotid endarterectomy): Right    S/P CABG x 1   delivery delivered    FAMILY HISTORY:  No pertinent family history in first degree relatives    Social History: Non Smoker, , Healthy children, Ambulates w. walker,     MEDICATIONS  (STANDING):  dextrose 5%. 1000 milliLiter(s) (50 mL/Hr) IV Continuous <Continuous>  dextrose 50% Injectable 12.5 Gram(s) IV Push once  dextrose 50% Injectable 25 Gram(s) IV Push once  dextrose 50% Injectable 25 Gram(s) IV Push once  insulin lispro (HumaLOG) corrective regimen sliding scale   SubCutaneous three times a day before meals  sodium chloride 0.9%. 1000 milliLiter(s) (85 mL/Hr) IV Continuous <Continuous>    MEDICATIONS  (PRN):  dextrose 40% Gel 15 Gram(s) Oral once PRN Blood Glucose LESS THAN 70 milliGRAM(s)/deciliter  glucagon  Injectable 1 milliGRAM(s) IntraMuscular once PRN Glucose LESS THAN 70 milligrams/deciliter    Allergies    Allergy Status Unknown  No Known Allergies    REVIEW OF SYSTEMS:    CONSTITUTIONAL: No fever, weight loss, + fatigue  EYES: Legally Blind, Corneal opacity -  R ;   ENMT:  No difficulty hearing, tinnitus, vertigo; No sinus or throat pain  NECK: No pain or stiffness  BREASTS: No pain, masses, or nipple discharge  RESPIRATORY: No cough, wheezing, chills or hemoptysis; + shortness of breath  CARDIOVASCULAR: No chest pain, palpitations, dizziness, + leg swelling  GASTROINTESTINAL: No abdominal or epigastric pain. No nausea, vomiting, or hematemesis; No diarrhea or constipation. No melena , +  hematochezia.  GENITOURINARY: No dysuria, frequency, hematuria, + Occ. incontinence  NEUROLOGICAL: No headaches, memory loss, loss of strength, + numbness, or tremors  SKIN: No itching, burning, rashes, or lesions   LYMPH NODES: No enlarged glands  ENDOCRINE: No heat or cold intolerance; No hair loss  MUSCULOSKELETAL: No joint pain or swelling; No muscle, back, or extremity pain  PSYCHIATRIC: No depression, anxiety, mood swings, or difficulty sleeping  HEME/LYMPH: No easy bruising, or bleeding gums  ALLERGY AND IMMUNOLOGIC: No hives or eczema      Vital Signs Last 24 Hrs  T(C): 36.9 (29 May 2019 12:23), Max: 36.9 (29 May 2019 12:23)  T(F): 98.4 (29 May 2019 12:23), Max: 98.4 (29 May 2019 12:23)  HR: 80 (29 May 2019 16:00) (79 - 80)  BP: 146/72 (29 May 2019 16:00) (139/79 - 146/72)  BP(mean): --  RR: 19 (29 May 2019 16:00) (18 - 19)  SpO2: 100% (29 May 2019 16:00) (76% - 100%)    PHYSICAL EXAM:    GENERAL: Dyspneic, well-groomed, well-developed, Pale,   HEAD:  Atraumatic, Normocephalic  EYES: EOMI, PERRLA, conjunctiva and sclera clear  ENMT: No tonsillar erythema, exudates, or enlargement; Moist mucous membranes, Good dentition, No lesions  NECK: Supple, No JVD, Normal thyroid  NERVOUS SYSTEM:  Alert & Oriented X3, Good concentration; Motor Strength 5/5 B/L upper and lower extremities; DTRs 2+ intact and symmetric  CHEST/LUNG: Dull to percussion bilaterally; + rales, rhonchi, wheezing, No  rubs, Midline chest scar,   HEART: Regular rate and rhythm; No murmurs, rubs, or gallops  ABDOMEN: Soft, Nontender, distended; Bowel sounds present  EXTREMITIES:  2+ Peripheral Pulses, No clubbing, cyanosis, 1+ edema  LYMPH: No lymphadenopathy noted  SKIN: No rashes or lesions  S/P Toe amputations,       LABS:                        7.6    7.6   )-----------( 271      ( 29 May 2019 13:52 )             22.9     05-    137  |  96<L>  |  52.0<H>  ----------------------------<  176<H>  6.1<HH>   |  30.0<H>  |  1.65<H>    Ca    8.7      29 May 2019 13:52    TPro  7.0  /  Alb  3.2<L>  /  TBili  0.6  /  DBili  x   /  AST  40<H>  /  ALT  51<H>  /  AlkPhos  187<H>      PT/INR - ( 29 May 2019 13:52 )   PT: 14.3 sec;   INR: 1.24 ratio      PTT - ( 29 May 2019 13:52 )  PTT:31.4 sec    RADIOLOGY & ADDITIONAL TESTS:    CT Chest No Cont     PROCEDURE DATE:  2019      INTERPRETATION:  CLINICAL INFORMATION: Pleural effusion    COMPARISON: None.    PROCEDURE:   CT of the Chest was performed without intravenous contrast.  Sagittal and coronal reformats were performed.  MIP reformats were performed    FINDINGS:    LUNGS AND AIRWAYS: Patent central airways.  The bilateral ground glass   opacities. Right basilar compressive atelectasis.    PLEURA: Moderate loculated right pleural effusion and trace left pleural   effusion.    MEDIASTINUM AND YAMILEX: No lymphadenopathy.    VESSELS: Within normal limits.    HEART: Mildly enlarged. Status post sternotomy and CABG. Coronary artery   calcifications. No pericardial effusion.    CHEST WALL AND LOWER NECK: Mild diffuse anasarca    VISUALIZED UPPER ABDOMEN: Small upper abdominal ascites. Questionable   heterogeneity at the dome of the liver, although evaluation is limited by   streak artifact. Vascular calcifications.    BONES: Status post sternotomy.    IMPRESSION:     Moderate loculated right pleural effusion and trace left pleural effusion.    Faint ground glass opacities compatible mild pulmonary vascular congestion.    Questionable heterogeneity at the dome of the liver, although evaluation   is limited due to streak artifact. Small upper abdominal ascites.       LUBNA GOMEZ M.D., ATTENDING RADIOLOGIST  This document has been electronically signed. May 19 2019  5:22PM

## 2019-05-29 NOTE — ED ADULT TRIAGE NOTE - CHIEF COMPLAINT QUOTE
pt c/o rectal bleeding that began this morning pt states to have been D'C this Thursday for CHF. Recently placed on plavix and aspirin 81mg. no further complaints at this time

## 2019-05-29 NOTE — ED ADULT NURSE NOTE - OBJECTIVE STATEMENT
at bedside reports he noted pt bleeding with BM since this morning. red blood noted in diaper pt arrives in with dark stool. pt without any complaints. A&Ox3. cleaned of incontinent stool, resting in stretcher. old healed over wound noted to coccyx.

## 2019-05-29 NOTE — CONSULT NOTE ADULT - ASSESSMENT
Principal Discharge DX:	GI bleed, Anemia,     Secondary Diagnosis:	Hyperkalemia,    Secondary Diagnosis:	Acute renal failure, CKD - DMN -Stage ? Nephrotic ?      P : IVF, Hold ARBs, Control DM,     PRBC Transfusion,     Low K+ Diet,     W.U,

## 2019-05-29 NOTE — ED PROVIDER NOTE - OBJECTIVE STATEMENT
This patient is a 64 year old woman with hx of HTM, PVD, DM, CAD with 1 stent and CHF who presents to the ER c/o rectal bleeding.  Patient was recently discharged from [facility] 6 days ago for CHF.  The bleeding began today and she reports more than 6 episodes of dark stools.  She denies abdominal pain, SOB, and palpitations.

## 2019-05-29 NOTE — ED ADULT NURSE NOTE - NSIMPLEMENTINTERV_GEN_ALL_ED
Implemented All Fall with Harm Risk Interventions:  [place] to call system. Call bell, personal items and telephone within reach. Instruct patient to call for assistance. Room bathroom lighting operational. Non-slip footwear when patient is off stretcher. Physically safe environment: no spills, clutter or unnecessary equipment. Stretcher in lowest position, wheels locked, appropriate side rails in place. Provide visual cue, wrist band, yellow gown, etc. Monitor gait and stability. Monitor for mental status changes and reorient to person, place, and time. Review medications for side effects contributing to fall risk. Reinforce activity limits and safety measures with patient and family. Provide visual clues: red socks.

## 2019-05-29 NOTE — CONSULT NOTE ADULT - PROBLEM SELECTOR RECOMMENDATION 9
Hx of diverticulosis- most likely diverticular bleed  - NPO  - transfuse 2 units PRBC and check post transfusion Hgb  - If pt does not repond to tx , will consider EGD as pt recently started plavix and ASA as per primary MD note. Will need cardiac clearance Hx of diverticulosis- most likely diverticular bleed  - NPO  - transfuse 2 units PRBC and check post transfusion Hgb  - If pt does not repond to tx , will consider nuclear bleeding scan ( CReatinine too elevated for CTA )and  EGD as pt recently started plavix and ASA as per primary MD note. Will need cardiac clearance

## 2019-05-29 NOTE — H&P ADULT - NSICDXPASTSURGICALHX_GEN_ALL_CORE_FT
PAST SURGICAL HISTORY:  After cataract     Atherosclerosis of right carotid artery      delivery delivered     Detached retina     No significant past surgical history     S/P CABG x 1     Uveitic glaucoma of both eyes

## 2019-05-29 NOTE — H&P ADULT - NSICDXPASTMEDICALHX_GEN_ALL_CORE_FT
PAST MEDICAL HISTORY:  CAD (coronary artery disease)     Congestive heart failure, unspecified HF chronicity, unspecified heart failure type     Depression     Diabetes     Diabetes     GERD (gastroesophageal reflux disease)     Glaucoma     Glaucoma     Herniated disc, cervical     History of CEA (carotid endarterectomy) Right    History of peripheral vascular disease     History of retinal detachment     Hyperlipidemia     Hypertension     Hypertension, unspecified type     Insomnia     Legally blind     PAD (peripheral artery disease)     Peripheral neuropathy     Retinal detachment     Urinary retention

## 2019-05-29 NOTE — ED ADULT NURSE NOTE - CCCP TRG CHIEF CMPLNT
Reason For Visit    APRYL CANNON presents for an INR check A@[facility].   Patient accompanied by: daughter Tree.   A home health visit was done on 09/26/18.   INR Follow-Up. [facility] Kirsten JACK ([phone]);** Pt's daughter, Tree, is CMA here at clinic works w/ Dr. Quezada.     Referred By   Indication: A Fib; CHADS2= 2 (HTN,DM); CHADSVASC = 4 (HTN,Age,DM,female) Goal INR Range: 2.0 - 2.5   Duration of therapy: long term     04/02- pt started using home monitor.   Pt referred to [facility] per PCP; pt has been taking 4mg warfarin daily for ~ 10 years for A Fib; recently transferred to [facility] for healthcare and management of warfarin therapy.      History of Present Illness    Symptoms:.   The patient is currently asymptomatic.   Additional Screening:.   No. Missed dose(s).    No: Extra dose(s).   No: Significant medication changes since last visit .   Yes: Vitamin K dietary changes.    Goal Vitamin K intake: 1-2. cup(s)/week. 09/26- Per  RN patient reports drinking \"protein shake\" at dialysis. Unsure what is in it, advised patient to ask if there is vitamin K in this shake.    No: S/Sx(s) of bleeding or bruising.    No: ETOH Intake. Does not drink ETOH.    No: Falls/Injuries.    No: Acute Illness.    Yes: Procedure/Hospital/ER Visit. 09/07- Patient hospitalzed at [facility] 8/28-9/5. Permacath placed and patient started on dialysis.   Other: Hospitalized 3/18-3/26/16 for ADHF/Acute COPD. Thoracentesis performed requiring warfarin hold.     Discharged from [facility] for SOB on 1/13/16     Pt was recently admitted to [facility] 10/2015 for massive hemoptysis, which started with a cough and this was associated with some respiratory distress. Patient's INR on admission was 2.3. A high-resolution CT suggested she had focal bronchiectasis. She underwent bronchoscopy 10/15/2015. No active bleeding or endobronchial lesions were visualized. It was thought that she had bronchiectasis and the mucosa was inflamed and   to bleeding. Her hemoptysis had resolved prior to the bronchoscopy, as her anticoagulation was reversed and it has improved. The patient has opted to continue Coumadin. So, she was discharged back on warfarin.      Current Meds   1. Accu-Chek Multiclix Lancets; USE TWICE DAILY AS DIRECTED;   Therapy: 78Uns7096 to (Evaluate:64Uaf2826)  Requested for: 74Wzh2104; Last   Rx:[id number] Ordered   2. Albuterol Sulfate (2.5 MG/3ML) 0.083% Inhalation Nebulization Solution; USE 1 UNIT   DOSE IN NEBULIZER EVERY 4 HOURS AS NEEDED;   Therapy: 52Mdd6755 to (Evaluate:36Sot0462)  Requested for: 46Wml2727; Last   Rx:31Mop5589 Ordered   3. Allopurinol 100 MG Oral Tablet; TAKE 1 TABLET BY MOUTH EVERY DAY;   Therapy: 73Lzp7342 to (Evaluate:22Atk1079)  Requested for: 69Xgx7916; Last   Rx:40Rez2125 Ordered   4. AmLODIPine Besylate 10 MG Oral Tablet; TAKE 1 TABLET BY MOUTH DAILY;   Therapy: 12Mar2016 to (Evaluate:10Mar2019)  Requested for: 09Ezp3071; Last   Rx:[id number] Ordered   5. Atorvastatin Calcium 40 MG Oral Tablet; TAKE 1 TABLET BY MOUTH EVERY NIGHT AT   BEDTIME;   Therapy: 80Sbs7014 to (Evaluate:63Qmo6384)  Requested for: 34Goy4582; Last   Rx:[id number] Ordered   6. Calcitriol 0.25 MCG Oral Capsule; TAKE ONE CAPSULE BY MOUTH THREE TIMES A   WEEK;   Therapy: 69Vpg9962 to (Evaluate:03Ync3749)  Requested for: 72Czr1052; Last   Rx:95Mdw0985 Ordered   7. Cefaclor 500 MG Oral Capsule;   Therapy: 70Buu7868 to Recorded   8. Clotrimazole-Betamethasone 1-0.05 % External Cream; APPLY SPARINGLY TO   AFFECTED AREA(S) 3 TIMES A DAY;   Therapy: 03Oct2016 to (Evaluate:77Tff6324)  Requested for: 48Pta5660; Last   Rx:11Qwe4049 Ordered   9. EpiPen 2-Richie 0.3 MG/0.3ML Injection Solution Auto-injector; Inject in mid-outer thigh in   case of tongue swelling, difficulty swallowing or breathing, acute onset hoarseness,or   severe allergic reaction;   Therapy: 13Apr2018 to (Evaluate:12Jun2018)  Requested for: 22Nml0925; Last   Rx:13Apr2018 Ordered   10.  Escitalopram Oxalate 10 MG Oral Tablet; TAKE 1 TABLET BEDTIME;    Therapy: 54Wrc4253 to (Evaluate:10Nov2018)  Requested for: 93Fdn3250; Last    Rx:65Wup2741 Ordered   11. Ferrous Sulfate 325 (65 Fe) MG Oral Tablet; TAKE 1 TABLET BY MOUTH THREE TIMES    DAILY;    Therapy: 09Znr8311 to (Evaluate:15Bon2138)  Requested for: 76Fmb4031; Last    Rx:34Whn9897 Ordered   12. Fexofenadine HCl - 180 MG Oral Tablet; TAKE 1 TABLET DAILY;    Therapy: 09Jun2017 to (Evaluate:73Dpo2413)  Requested for: 19Oxr3055; Last    Rx:09Jun2017 Ordered   13. Flovent  MCG/ACT Inhalation Aerosol; INHALE 2 PUFFS BY MOUTH TWICE    DAILY;    Therapy: 19Jun2015 to (Evaluate:17Oct2015)  Requested for: 88Gps4085; Last    Rx:19Jun2015 Ordered   14. Furosemide 40 MG Oral Tablet; 1 pill on monday, wed/friday and sat.  once a day;    Therapy: 21Jan2016 to (Evaluate:72Haj3054)  Requested for: 93Cgk1520; Last    Rx:19Yob6976 Ordered   15. HumaLOG KwikPen 100 UNIT/ML Subcutaneous Solution Pen-injector; INJECT 10    UNITS SUBCUTANEOUSLY THREE TIMES DAILY WITH MEALS AS DIRECTED;    Therapy: 05Jan2016 to (Evaluate:05Mar2016)  Requested for: 05Jan2016; Last    Rx:05Jan2016 Ordered   16. HydrALAZINE HCl - 50 MG Oral Tablet; TAKE 1 TABLET BY MOUTH THREE TIMES DAILY;    Therapy: 17Zmu2905 to (Evaluate:95Rlk3210)  Requested for: 86Cct8400; Last    Rx:[id number] Ordered   17. Klor-Con 20 MEQ Oral Packet;    Therapy: 52Ukp4329 to Recorded   18. Levothyroxine Sodium 100 MCG Oral Tablet; TAKE 1 TABLET BY MOUTH DAILY;    Therapy: 01May2014 to (Evaluate:21Oct2018)  Requested for: 77Rkg3998; Last    Rx:44Iti9624 Ordered   19. Metoprolol Succinate ER 50 MG Oral Tablet Extended Release 24 Hour; TAKE 1 TABLET    BY MOUTH TWICE DAILY;    Therapy: 02Oct2017 to (Evaluate:06Jan2019)  Requested for: 78Bkt0385; Last    Rx:97Ofg7885 Ordered   20. Montelukast Sodium 10 MG Oral Tablet; TAKE 1 TABLET BY MOUTH DAILY;    Therapy: 04Jan2014 to (Evaluate:06Nov2018)  Requested for:  79Yse3764; Last    Rx:03Jwk0490 Ordered   21. NovoFine 32G X 6 MM; use four times a day;    Therapy: 33Wpx7797 to (Evaluate:26Urx9327)  Requested for: 49Twx4449; Last    Rx:16Flo1025 Ordered   22. ProAir  (90 Base) MCG/ACT Inhalation Aerosol Solution; INHALE 1 TO 2 PUFFS    BY MOUTH EVERY 4 TO 6 HOURS AS NEEDED AS DIRECTED;    Therapy: 18Oct2014 to (Evaluate:94Hfz8330)  Requested for: 00Qvm9573; Last    Rx:25Ndc4634 Ordered   23. Senna Laxative 25 MG Oral Tablet; TAKE 1 TABLET After meals;    Therapy: 36Cxj4669 to (Evaluate:09Nov2018)  Requested for: 81Aaq8871; Last    Rx:54Pke5402 Ordered   24. True Metrix Blood Glucose Test In Vitro Strip; test blood sugar twice daily;    Therapy: 75Mbx4174 to (Evaluate:82Gnl5329)  Requested for: 12Jun2018; Last    Rx:12Jun2018 Ordered   25. TRUEtest Test In Vitro Strip; TEST 2 TIMES A DAY;    Therapy: 98Lxt3411 to (Evaluate:85Rhe9989)  Requested for: 38Sft6864; Last    Rx:[id number] Ordered   26. Warfarin Sodium 4 MG Oral Tablet; TAKE 1 TABLET BY MOUTH DAILY, AS DIRECTED BY    COUMADIN CLINIC;    Therapy: 23Jan2015 to (Evaluate:06Nov2018)  Requested for: 08Zuz8309; Last    Rx:[id number] Ordered    Results/Data  DATE CURRENT DOSE(4mg tabs) INR(2.0 - 2.5) PLAN(PM)  09/26/18 4mg daily 1.7 6mgx1(W) then CPM  09/19/18 6gmx1,4mgx4 1.9 Start 4mg Daily   09/14/18 6mgx1, 4mgx3 1.7 6mgx1(F) then 4mgx4(Sa,Paul,M,Tu)  09/10/18 4mgx3 (F,Sa,Paul) 1.4 6mgx1(M) then 4mgx3 (Tu,W,Th)  09/07/18 2mgF;4mgROW(26mg/wk) 1.4 4mgx1(F) then CPM  08/28/18 2mgF;4mgROW(26mg/wk) 2.8 2mgx1(Tu) then CPM; Etio: decreased Vit K intake, acute illness?  08/06/18 2mgF;4mgROW(26mg/wk) 2.2 CPM  07/20/18 4mgdaily(28mg/wk) 2.8 DECR 2mgF;4mgROW(26mg/wk;7%); Etio: unknown  07/06/18 4mgdaily(28mg/wk) 2.7 CPM; Note: Patient to have extra Vit K today  06/21/18 4mgDaily(28mg/wk) 2.5 CPM  06/07/18 4mgDaily(28mg/wk) 2.2 CPM; Note: Patient did not follow dose DECR  05/24/18 4mgDaily(28mg/wk) 3.1 DECR 2mgTh;4mgROW (26mg/wk;7%);  Etio: decreased Vit K intake  05/10/18 4mgDaily(28mg/wk) 3.2 Holdx1(Th) then CPM; Etio: decreased Vit K intake  04/25/18 4mgDaily(28mg/wk) 2.8 2mgx1(W) then CPM; Etio: decreased Vit K intake  04/12/18 4mgDaily(28mg/wk) 2.2 CPM  04/02/18 4mgDaily(28mg/wk) 2.3 CPM  03/14/18 4mgDaily(28mg/wk) 2.1 CPM  02/16/18 4mgDaily(28mg/wk) 2.1 CPM   01/26/18 4mgDaily(28mg/wk) 2.9 2mgx1(M) then CPM(etio:unknown)   01/02/18 4mgDaily(28mg/wk) 2.0 CPM  11/29/17 4mgDaily(28mg/wk) 2.2 CPM  10/27/17 4mgDaily(28mg/wk) 2.2 CPM  09/27/17 4mgDaily(28mg/wk) 2.5 CPM  08/29/17 4mgDaily(28mg/wk) 1.8 6mgx1(Tu):then CPM  07/26/17 4mgDaily(28mg/wk) 2.3 CPM   06/28/17 4mgDaily(28mg/wk) 2.1 CPM  06/09/17 4mgDaily(28mg/wk) 2.8 2mgx1(F):then CPM   05/16/17 4mgDaily(28mg/wk) 2.0 CPM  04/26/17 4mgDaily(28mg/wk) 1.8 6mgx1(W), then CPM; Etio: unknown   03/29/17 4mgDaily(28mg/wk) 2.2 CPM  02/28/17 4mgDaily(28mg/wk) 2.0 CPM  01/31/17 4mgDaily(28mg/wk) 2.6 CPM  01/16/17 4mgDaily(28mg/wk) 2.9 2mgx1(M), then CPM; Etio: unknown  12/20/16 4mgDaily(28mg/wk) 2.5 CPM  11/21/16 4mgDaily(28mg/wk) 2.6 CPM  11/09/16 6mgSu;4mgROW(30mg/wk) 2.9 DECR 4mgDaily(28mg/wk; 6%); Etio: unknown  10/26/16 6mg SuTh; 4mg ROW(32mg/wk) 3.9 Holdx1(W), then DECR 6mgSu;4mgROW(30mg/wk; 6%); Etio: decr vit k  10/12/16 6mg SuTh; 4mg ROW(32mg/wk) 3.8 Holdx1(W), then CPM; Etio: decreased vitamin k  09/14/16 6mg SuTh; 4mg ROW(32mg/wk) 2.2 CPM   08/25/16 6mg SuTh; 4mg ROW(32mg/wk) 2.5 CPM  08/10/16 6mgTh;4mgDaily(30mg/wk) 1.9 INCR 6mg SuTh; 4mg ROW(32mg/wk;7%);   Etio: CoQ10?; unknown   07/28/16 4mgDaily(28mg/wk) 1.6 INCR 3ghJw1qhKojks(30mg/wk;7%); Etio: Conenzyme Q 10?  07/15/16 2mgM;4mgROW (26mg/wk) 1.3 INCR 4mgDaily(28mg/wk;7%); Etio: Coenzyme Q 10?   06/29/16 2mgM;4mgROW (26mg/wk) 1.7 6mgx1(W), then CPM; Etio: unknown  05/31/16 2mgM;4mgROW (26mg/wk) 2.7 CPM  05/05/16 2mgM;4mgROW (26mg/wk) 2.7 CPM  04/21/16 2mgM;4mgROW (26mg/wk) 2.0 CPM  04/14/16 2mgM;4mgROW (26mg/wk) 2.6 CPM  04/07/16  2mgM;4mgROW (26mg/wk) 2.0 CPM  03/31/16 2mgM;4mgROW (26mg/wk) + INPT? 1.2 8mgx1(Th); then Restart 2mgM;4mgROW (26mg/wk); etio: re-establishing dose post hospitalization   03/28/16 2mgM;4mgROW (26mg/wk) + INPT? 1.3 Restart 2mgM;4mgROW (26mg/wk)   03/15/16 2mgM;4mgROW (26mg/wk) 2.7 CPM  03/08/16 2mgM;4mgROW (26mg/wk) 2.2 CPM  03/01/16 2mgMF, 4mgROW (24mg/wk) 1.9 INCR 2mgM;4mgROW (26mg/wk)  02/23/16 2mgMF, 4mgROW (24mg/wk) 1.7 6mgx1(Tu); then CPM  02/16/16 2 mg MWF;4mgROW (22mg/wk) 1.7 INCR 2mgMF, 4mgROW (24mg/wk)  02/09/16 2 mg MWF;4mgROW (22mg/wk) 1.9 CPM  02/02/16 2mgMF, 4mgROW (24mg/wk) 2.5 Start 2 mg MWF;4mgROW (22mg/wk)  01/26/16 2mgMF, 4mgROW (24mg/wk) 3.0 2mgx1(Tu); then CPM; etio: re-establishing dose  01/20/16 2mgMF, 4mgROW (24mg/wk) 1.6 6mgx1(W); then CPM; etio: re-establishing dose post-hospitalization  12/22/15 2mgMF, 4mgROW (24mg/wk) 2.7 CPM; pt to take extra greens today   11/19/15 2mgMF, 4mgROW (24mg/wk) 1.9 6mgx1(Th); then CPM; etio: missed dose  11/05/15 2mgMF, 4mgROW (24mg/wk) 2.1 CPM  10/22/15 2mgMF, 4mgROW (24mg/wk) 1.8 CPM  09/18/15 2mgMF, 4mgROW (24mg/wk) 2.2 CPM   08/14/15 2mgMF, 4mgROW (24mg/wk) 2.2 CPM   07/17/15 2mgMF, 4mgROW (24mg/wk) 2.9 CPM  06/19/15 2mgMF, 4mgROW (24mg/wk) 2.3 CPM  05/29/15 2mgMF, 4mgROW (24mg/wk) 2.8 CPM  05/15/15 2mgF, 4mgROW(26mg/wk) 3.3 HOLDx1(F), DECR 2mgMF, 4mgROW (24mg/wk) Etio: unknown  04/17/15 2mgF, 4mgROW(26mg/wk) 3.0 CPM  04/03/15 2mgF, 4mgROW(26mg/wk) 3.8 HOLDx1(F), then CPM; Etio: decr vitamin k  03/06/15 4mg daily (28mg/wk) 2.5 Decr 2mgF, 4mgROW(26mg/wk)  02/18/15 2mgF; 4mg ROW((26mg/wk) 3.5 Holdx1(F), then CPM; Etio: unknown/maint dose not well estab'd  02/06/15 4mg daily(28mg/wk) 3.2 DECR 2mgF; 4mg ROW((26mg/wk;9%); Etio: maint dose not well estab'd  01/23/15 4mg daily(28mg/wk) 3.3 2mg x1(F); then CPM; Etio: unknown; suspect inconsistent w/ high vit k foods; may need to estab goal  12/05/14 4mg daily 2.7 CPMCoumadin New    ** Printed in Appendix #1 below.    Assessment                                                                                                                                Atrial fibrillation (427.31) (I48.91)                                                                                                                                   Atrial fibrillation (427.31) (I48.91)          Indication: A Fib; CHADS2= 2 (HTN,DM); CHADSVASC = 4 (HTN,Age,DM,female).   Goal INR Range: 2.0 - 2.5.   Estimated Duration: Long term.   Anticoagulation Clinic Order Date: 16.   INR is subtherapeutic today.      Orders      Follow-up: 7 days - 10/3/18.     Provided verbal dosing instructions.      Counseling/Education    Stressed the importance of compliance with EXACT recommended dosage regimen:   Instructed patient to notify clinic if any medication changes and/or health status changes occur prior to next appointment:   Instructed to notify clinic if any future procedures are scheduled, especially if anticoagulation treatment must be withheld:   Discussed the risk of thrombotic and bleeding complications with inappropriate use of anticoagulant:   Patient agrees to all of the above       Signatures   Electronically signed by : Sneha Leonard R.N.; Sep 26 2018  1:04PM CST    Appendix #1     Coumadin New   Patient: APRYL CANNON; : 1943; MRN: [id number]      [id number] 45Adx7486 86Oju0215 68Ees0176 49Kri3116 96Kex5797 16Epe7824 55Evh8929 23Wvf7977   PROTHROMBIN TIME       20.9 sec 21.7 sec     INR      2.8  2.1  2.2  2.8  2.7    INR, FINGERSTICK 1.7  1.9  1.7  1.4  1.4         COMMENT HH  [facility]          rectal bleeding

## 2019-05-29 NOTE — CDI QUERY NOTE - NSCDIOTHERTXTBX_GEN_ALL_CORE_HH
Documentation noted patient had CardioMEMS inserted.     Can you please specify site and laterality of CardioMEMS device insertion?    A. Left pulmonary artery  B. Right pulmonary Artery  C. Other, please specify  D. Not clinically significant    Supporting Documentation:   PROCEDURE:  --  Right heart catheterization.  --  Right heart angiography.  --  Sonosite.  --  CardioMEMS device implant.  --  Sheath Exchange for Intervention.  TECHNIQUE: The risks and alternatives of the procedures and conscious  sedation were explained to the patient and informed consent was obtained.  Local anesthetic given. Right femoral vein access. Right heart  catheterization. The procedure was performed utilizing a catheter. Right  heart angiography. A catheter was advanced to the. Contrast was injected.  Images were obtained. Sonosite. RADIATION EXPOSURE: 9.5 min. CardioMEMS  device implant. Sheath Exchange for Intervention.  DIAGNOSTIC IMPRESSIONS: Severe pulmonary hypertension.  Mild to moderate elevated wedge pressure.  Left pulmonary angiogram performed and CardioMems device deployed.  DIAGNOSTIC RECOMMENDATIONS: Continue current diuretic therapy.

## 2019-05-29 NOTE — ED PROVIDER NOTE - PMH
CAD (coronary artery disease)    Congestive heart failure, unspecified HF chronicity, unspecified heart failure type    Depression    Diabetes    Diabetes    GERD (gastroesophageal reflux disease)    Glaucoma    Glaucoma    Herniated disc, cervical    History of CEA (carotid endarterectomy)  Right  History of peripheral vascular disease    History of retinal detachment    Hyperlipidemia    Hypertension    Hypertension, unspecified type    Insomnia    Legally blind    PAD (peripheral artery disease)    Peripheral neuropathy    Retinal detachment    Urinary retention

## 2019-05-29 NOTE — H&P ADULT - HISTORY OF PRESENT ILLNESS
63 yo female with multiple medical problems recently started on aspirin /plavix presented to the ED with c/o red blood from rectum . Pt had noticed loose BM this am with red and dark color blood per rectum with BM , had few since this morning , she denies any abdominal pain , no nausea , she was at [facility] discharge ed on 5/24 for CHF exacerbation , she has COPd oxygen dependent .   OLd records reviewed , denies chest pain , no dizziness

## 2019-05-29 NOTE — H&P ADULT - ASSESSMENT
63 yo female with CAD , COPD oxygen dependent , CHF admitted for rectal bleed , anemia     1- Rectal bleed   monitor had abdominal  tenderness on the left   CT of abdomen , start PPI   GI consult called   pt had rectal bleed in Feb records reviewed colonoscopy  done then poor prep , diverticulosis seen   monitor h/h closely   2- Anemia of blood loss on croinc anemia   last hb 8.4   tx to keep hb over 8.5 given cardiac history status   will check iron studies     3- CAD - asymptomatic   cont home meds , hold aspirin lasix for now   4-- ARF baseline cr 1.01 in march 2019   cont to monitor   5- Diabetes Mellitus type 2   cont lantus , insulin sliding scale

## 2019-05-29 NOTE — ED ADULT NURSE NOTE - PSH
After cataract    Atherosclerosis of right carotid artery     delivery delivered    Detached retina    No significant past surgical history    S/P CABG x 1    Uveitic glaucoma of both eyes

## 2019-05-30 ENCOUNTER — TRANSCRIPTION ENCOUNTER (OUTPATIENT)
Age: 64
End: 2019-05-30

## 2019-05-30 LAB
ALBUMIN SERPL ELPH-MCNC: 3 G/DL — LOW (ref 3.3–5.2)
ALBUMIN SERPL ELPH-MCNC: 3 G/DL — LOW (ref 3.3–5.2)
ALP SERPL-CCNC: 153 U/L — HIGH (ref 40–120)
ALT FLD-CCNC: 43 U/L — HIGH
ANION GAP SERPL CALC-SCNC: 13 MMOL/L — SIGNIFICANT CHANGE UP (ref 5–17)
ANION GAP SERPL CALC-SCNC: 13 MMOL/L — SIGNIFICANT CHANGE UP (ref 5–17)
AST SERPL-CCNC: 34 U/L — HIGH
BILIRUB SERPL-MCNC: 0.7 MG/DL — SIGNIFICANT CHANGE UP (ref 0.4–2)
BUN SERPL-MCNC: 47 MG/DL — HIGH (ref 8–20)
BUN SERPL-MCNC: 47 MG/DL — HIGH (ref 8–20)
CALCIUM SERPL-MCNC: 9 MG/DL — SIGNIFICANT CHANGE UP (ref 8.6–10.2)
CALCIUM SERPL-MCNC: 9 MG/DL — SIGNIFICANT CHANGE UP (ref 8.6–10.2)
CALCIUM SERPL-MCNC: 9.1 MG/DL — SIGNIFICANT CHANGE UP (ref 8.4–10.5)
CHLORIDE SERPL-SCNC: 97 MMOL/L — LOW (ref 98–107)
CHLORIDE SERPL-SCNC: 97 MMOL/L — LOW (ref 98–107)
CO2 SERPL-SCNC: 30 MMOL/L — HIGH (ref 22–29)
CO2 SERPL-SCNC: 30 MMOL/L — HIGH (ref 22–29)
CREAT SERPL-MCNC: 1.18 MG/DL — SIGNIFICANT CHANGE UP (ref 0.5–1.3)
CREAT SERPL-MCNC: 1.18 MG/DL — SIGNIFICANT CHANGE UP (ref 0.5–1.3)
GLUCOSE BLDC GLUCOMTR-MCNC: 103 MG/DL — HIGH (ref 70–99)
GLUCOSE BLDC GLUCOMTR-MCNC: 115 MG/DL — HIGH (ref 70–99)
GLUCOSE BLDC GLUCOMTR-MCNC: 251 MG/DL — HIGH (ref 70–99)
GLUCOSE BLDC GLUCOMTR-MCNC: 364 MG/DL — HIGH (ref 70–99)
GLUCOSE BLDC GLUCOMTR-MCNC: 410 MG/DL — HIGH (ref 70–99)
GLUCOSE SERPL-MCNC: 87 MG/DL — SIGNIFICANT CHANGE UP (ref 70–115)
GLUCOSE SERPL-MCNC: 87 MG/DL — SIGNIFICANT CHANGE UP (ref 70–115)
HCT VFR BLD CALC: 23.6 % — LOW (ref 37–47)
HCT VFR BLD CALC: 26.3 % — LOW (ref 37–47)
HGB BLD-MCNC: 8.1 G/DL — LOW (ref 12–16)
HGB BLD-MCNC: 8.7 G/DL — LOW (ref 12–16)
INR BLD: 1.18 RATIO — HIGH (ref 0.88–1.16)
MCHC RBC-ENTMCNC: 29.3 PG — SIGNIFICANT CHANGE UP (ref 27–31)
MCHC RBC-ENTMCNC: 30.1 PG — SIGNIFICANT CHANGE UP (ref 27–31)
MCHC RBC-ENTMCNC: 33.1 G/DL — SIGNIFICANT CHANGE UP (ref 32–36)
MCHC RBC-ENTMCNC: 34.3 G/DL — SIGNIFICANT CHANGE UP (ref 32–36)
MCV RBC AUTO: 87.7 FL — SIGNIFICANT CHANGE UP (ref 81–99)
MCV RBC AUTO: 88.6 FL — SIGNIFICANT CHANGE UP (ref 81–99)
NT-PROBNP SERPL-SCNC: 5670 PG/ML — HIGH (ref 0–300)
PHOSPHATE SERPL-MCNC: 3.9 MG/DL — SIGNIFICANT CHANGE UP (ref 2.4–4.7)
PLATELET # BLD AUTO: 284 K/UL — SIGNIFICANT CHANGE UP (ref 150–400)
PLATELET # BLD AUTO: 300 K/UL — SIGNIFICANT CHANGE UP (ref 150–400)
POTASSIUM SERPL-MCNC: 4.6 MMOL/L — SIGNIFICANT CHANGE UP (ref 3.5–5.3)
POTASSIUM SERPL-MCNC: 4.6 MMOL/L — SIGNIFICANT CHANGE UP (ref 3.5–5.3)
POTASSIUM SERPL-SCNC: 4.6 MMOL/L — SIGNIFICANT CHANGE UP (ref 3.5–5.3)
POTASSIUM SERPL-SCNC: 4.6 MMOL/L — SIGNIFICANT CHANGE UP (ref 3.5–5.3)
PROT SERPL-MCNC: 7 G/DL — SIGNIFICANT CHANGE UP (ref 6.6–8.7)
PROTHROM AB SERPL-ACNC: 13.6 SEC — HIGH (ref 10–12.9)
PTH-INTACT FLD-MCNC: 107 PG/ML — HIGH (ref 15–65)
RBC # BLD: 2.69 M/UL — LOW (ref 4.4–5.2)
RBC # BLD: 2.97 M/UL — LOW (ref 4.4–5.2)
RBC # FLD: 16.9 % — HIGH (ref 11–15.6)
RBC # FLD: 17.1 % — HIGH (ref 11–15.6)
SODIUM SERPL-SCNC: 140 MMOL/L — SIGNIFICANT CHANGE UP (ref 135–145)
SODIUM SERPL-SCNC: 140 MMOL/L — SIGNIFICANT CHANGE UP (ref 135–145)
WBC # BLD: 7.8 K/UL — SIGNIFICANT CHANGE UP (ref 4.8–10.8)
WBC # BLD: 8.1 K/UL — SIGNIFICANT CHANGE UP (ref 4.8–10.8)
WBC # FLD AUTO: 7.8 K/UL — SIGNIFICANT CHANGE UP (ref 4.8–10.8)
WBC # FLD AUTO: 8.1 K/UL — SIGNIFICANT CHANGE UP (ref 4.8–10.8)

## 2019-05-30 PROCEDURE — 99233 SBSQ HOSP IP/OBS HIGH 50: CPT

## 2019-05-30 PROCEDURE — 78278 ACUTE GI BLOOD LOSS IMAGING: CPT | Mod: 26

## 2019-05-30 RX ORDER — IRON SUCROSE 20 MG/ML
100 INJECTION, SOLUTION INTRAVENOUS EVERY 24 HOURS
Refills: 0 | Status: COMPLETED | OUTPATIENT
Start: 2019-05-30 | End: 2019-06-01

## 2019-05-30 RX ORDER — FOLIC ACID 0.8 MG
1 TABLET ORAL DAILY
Refills: 0 | Status: DISCONTINUED | OUTPATIENT
Start: 2019-05-30 | End: 2019-06-03

## 2019-05-30 RX ORDER — PANTOPRAZOLE SODIUM 20 MG/1
40 TABLET, DELAYED RELEASE ORAL
Refills: 0 | Status: DISCONTINUED | OUTPATIENT
Start: 2019-05-30 | End: 2019-06-03

## 2019-05-30 RX ORDER — SOD SULF/SODIUM/NAHCO3/KCL/PEG
4000 SOLUTION, RECONSTITUTED, ORAL ORAL ONCE
Refills: 0 | Status: COMPLETED | OUTPATIENT
Start: 2019-05-30 | End: 2019-05-30

## 2019-05-30 RX ORDER — INSULIN LISPRO 100/ML
8 VIAL (ML) SUBCUTANEOUS ONCE
Refills: 0 | Status: COMPLETED | OUTPATIENT
Start: 2019-05-30 | End: 2019-05-30

## 2019-05-30 RX ADMIN — IRON SUCROSE 210 MILLIGRAM(S): 20 INJECTION, SOLUTION INTRAVENOUS at 14:06

## 2019-05-30 RX ADMIN — INSULIN GLARGINE 15 UNIT(S): 100 INJECTION, SOLUTION SUBCUTANEOUS at 22:08

## 2019-05-30 RX ADMIN — Medication 5 MG/HR: at 02:26

## 2019-05-30 RX ADMIN — Medication 1 DROP(S): at 05:24

## 2019-05-30 RX ADMIN — CARVEDILOL PHOSPHATE 6.25 MILLIGRAM(S): 80 CAPSULE, EXTENDED RELEASE ORAL at 17:21

## 2019-05-30 RX ADMIN — GABAPENTIN 100 MILLIGRAM(S): 400 CAPSULE ORAL at 05:24

## 2019-05-30 RX ADMIN — Medication 500 MILLIGRAM(S): at 14:07

## 2019-05-30 RX ADMIN — GABAPENTIN 100 MILLIGRAM(S): 400 CAPSULE ORAL at 14:12

## 2019-05-30 RX ADMIN — GABAPENTIN 100 MILLIGRAM(S): 400 CAPSULE ORAL at 22:03

## 2019-05-30 RX ADMIN — DULOXETINE HYDROCHLORIDE 60 MILLIGRAM(S): 30 CAPSULE, DELAYED RELEASE ORAL at 13:59

## 2019-05-30 RX ADMIN — Medication 8 UNIT(S): at 22:51

## 2019-05-30 RX ADMIN — Medication 60 MILLIGRAM(S): at 14:02

## 2019-05-30 RX ADMIN — Medication 3 MILLILITER(S): at 03:21

## 2019-05-30 RX ADMIN — Medication 1 DROP(S): at 14:02

## 2019-05-30 RX ADMIN — Medication 4000 MILLILITER(S): at 18:32

## 2019-05-30 RX ADMIN — AMLODIPINE BESYLATE 5 MILLIGRAM(S): 2.5 TABLET ORAL at 05:24

## 2019-05-30 RX ADMIN — Medication 1 TABLET(S): at 14:06

## 2019-05-30 RX ADMIN — Medication 1 MILLIGRAM(S): at 14:01

## 2019-05-30 RX ADMIN — LOSARTAN POTASSIUM 25 MILLIGRAM(S): 100 TABLET, FILM COATED ORAL at 05:24

## 2019-05-30 RX ADMIN — CARVEDILOL PHOSPHATE 6.25 MILLIGRAM(S): 80 CAPSULE, EXTENDED RELEASE ORAL at 05:24

## 2019-05-30 RX ADMIN — Medication 1 DROP(S): at 00:49

## 2019-05-30 RX ADMIN — Medication 6: at 17:20

## 2019-05-30 RX ADMIN — Medication 3 MILLILITER(S): at 08:39

## 2019-05-30 RX ADMIN — PANTOPRAZOLE SODIUM 40 MILLIGRAM(S): 20 TABLET, DELAYED RELEASE ORAL at 17:21

## 2019-05-30 NOTE — PROGRESS NOTE ADULT - ASSESSMENT
63 yo female with CAD , CRF, Diabetes Mellitus type and HTN admitted for rectal bleed anemia     1- Rectal Bleed   bleeding scan ordered GI input appreciated   cont PPI , hold aspirin plavix   monitor Hb closely     2- Anemia of acute blood loss on cronic disease anemia   s/p blood tx 1 units   add iv venofer   monitor hb q6h   3- H/O CHD diastolic cronic   cont lasix   4- HTN - controlled   cont home meds   5- CRF stage 3   renal input appreciated   stable   4- Diabetes Mellitus type 2   cont sliding scale insulin coverage

## 2019-05-30 NOTE — PROGRESS NOTE ADULT - ASSESSMENT
1. KEITH on CKD (DMN)  2. GI Bleed  3. Anemia  4. Hyperkalemia  5. DM       IVF  Hold ARBs  Control DM  PRBC Transfusion   Low K+ Diet   IV Lasix  Continue current management

## 2019-05-30 NOTE — PROGRESS NOTE ADULT - ASSESSMENT
At this time, please check CBC q 6 hourly. Call cardiology evaluation. Add PPI bid. Schedule NM bleeding scan. Gentle IV fluid hydration. She is on lasix drip, please titrate the fluid and diuretic balance  Transfuse PRBC as needed to keep Hct >24  Hold antiplatelets or anticoagulation

## 2019-05-31 ENCOUNTER — TRANSCRIPTION ENCOUNTER (OUTPATIENT)
Age: 64
End: 2019-05-31

## 2019-05-31 LAB
24R-OH-CALCIDIOL SERPL-MCNC: 31.1 NG/ML — SIGNIFICANT CHANGE UP (ref 30–80)
ANION GAP SERPL CALC-SCNC: 13 MMOL/L — SIGNIFICANT CHANGE UP (ref 5–17)
APPEARANCE UR: ABNORMAL
BACTERIA # UR AUTO: ABNORMAL
BILIRUB UR-MCNC: NEGATIVE — SIGNIFICANT CHANGE UP
BUN SERPL-MCNC: 42 MG/DL — HIGH (ref 8–20)
CALCIUM SERPL-MCNC: 8.6 MG/DL — SIGNIFICANT CHANGE UP (ref 8.6–10.2)
CHLORIDE SERPL-SCNC: 97 MMOL/L — LOW (ref 98–107)
CO2 SERPL-SCNC: 33 MMOL/L — HIGH (ref 22–29)
COLOR SPEC: YELLOW — SIGNIFICANT CHANGE UP
CREAT ?TM UR-MCNC: 56 MG/DL — SIGNIFICANT CHANGE UP
CREAT SERPL-MCNC: 0.98 MG/DL — SIGNIFICANT CHANGE UP (ref 0.5–1.3)
DIFF PNL FLD: ABNORMAL
EPI CELLS # UR: SIGNIFICANT CHANGE UP
GLUCOSE BLDC GLUCOMTR-MCNC: 133 MG/DL — HIGH (ref 70–99)
GLUCOSE BLDC GLUCOMTR-MCNC: 180 MG/DL — HIGH (ref 70–99)
GLUCOSE BLDC GLUCOMTR-MCNC: 309 MG/DL — HIGH (ref 70–99)
GLUCOSE BLDC GLUCOMTR-MCNC: 87 MG/DL — SIGNIFICANT CHANGE UP (ref 70–99)
GLUCOSE SERPL-MCNC: 111 MG/DL — SIGNIFICANT CHANGE UP (ref 70–115)
GLUCOSE UR QL: NEGATIVE MG/DL — SIGNIFICANT CHANGE UP
HCT VFR BLD CALC: 21.3 % — LOW (ref 37–47)
HCT VFR BLD CALC: 25 % — LOW (ref 37–47)
HGB BLD-MCNC: 7 G/DL — CRITICAL LOW (ref 12–16)
HGB BLD-MCNC: 8.3 G/DL — LOW (ref 12–16)
KETONES UR-MCNC: NEGATIVE — SIGNIFICANT CHANGE UP
LEUKOCYTE ESTERASE UR-ACNC: ABNORMAL
MCHC RBC-ENTMCNC: 29 PG — SIGNIFICANT CHANGE UP (ref 27–31)
MCHC RBC-ENTMCNC: 29.2 PG — SIGNIFICANT CHANGE UP (ref 27–31)
MCHC RBC-ENTMCNC: 32.9 G/DL — SIGNIFICANT CHANGE UP (ref 32–36)
MCHC RBC-ENTMCNC: 33.2 G/DL — SIGNIFICANT CHANGE UP (ref 32–36)
MCV RBC AUTO: 88 FL — SIGNIFICANT CHANGE UP (ref 81–99)
MCV RBC AUTO: 88.4 FL — SIGNIFICANT CHANGE UP (ref 81–99)
NITRITE UR-MCNC: NEGATIVE — SIGNIFICANT CHANGE UP
PH UR: 7 — SIGNIFICANT CHANGE UP (ref 5–8)
PLATELET # BLD AUTO: 291 K/UL — SIGNIFICANT CHANGE UP (ref 150–400)
PLATELET # BLD AUTO: 319 K/UL — SIGNIFICANT CHANGE UP (ref 150–400)
POTASSIUM SERPL-MCNC: 3.6 MMOL/L — SIGNIFICANT CHANGE UP (ref 3.5–5.3)
POTASSIUM SERPL-SCNC: 3.6 MMOL/L — SIGNIFICANT CHANGE UP (ref 3.5–5.3)
PROT ?TM UR-MCNC: 17 MG/DL — HIGH (ref 0–12)
PROT UR-MCNC: 30 MG/DL
PROT/CREAT UR-RTO: 0.3 RATIO — HIGH
RBC # BLD: 2.41 M/UL — LOW (ref 4.4–5.2)
RBC # BLD: 2.84 M/UL — LOW (ref 4.4–5.2)
RBC # FLD: 17.2 % — HIGH (ref 11–15.6)
RBC # FLD: 17.4 % — HIGH (ref 11–15.6)
RBC CASTS # UR COMP ASSIST: ABNORMAL /HPF (ref 0–4)
SODIUM SERPL-SCNC: 143 MMOL/L — SIGNIFICANT CHANGE UP (ref 135–145)
SP GR SPEC: 1.01 — SIGNIFICANT CHANGE UP (ref 1.01–1.02)
UROBILINOGEN FLD QL: 1 MG/DL
WBC # BLD: 6.2 K/UL — SIGNIFICANT CHANGE UP (ref 4.8–10.8)
WBC # BLD: 7 K/UL — SIGNIFICANT CHANGE UP (ref 4.8–10.8)
WBC # FLD AUTO: 6.2 K/UL — SIGNIFICANT CHANGE UP (ref 4.8–10.8)
WBC # FLD AUTO: 7 K/UL — SIGNIFICANT CHANGE UP (ref 4.8–10.8)
WBC UR QL: >50

## 2019-05-31 PROCEDURE — 99232 SBSQ HOSP IP/OBS MODERATE 35: CPT

## 2019-05-31 PROCEDURE — 99231 SBSQ HOSP IP/OBS SF/LOW 25: CPT

## 2019-05-31 PROCEDURE — 45378 DIAGNOSTIC COLONOSCOPY: CPT

## 2019-05-31 RX ORDER — SODIUM CHLORIDE 9 MG/ML
1000 INJECTION, SOLUTION INTRAVENOUS
Refills: 0 | Status: DISCONTINUED | OUTPATIENT
Start: 2019-05-31 | End: 2019-05-31

## 2019-05-31 RX ORDER — MULTIVIT WITH MIN/MFOLATE/K2 340-15/3 G
1 POWDER (GRAM) ORAL ONCE
Refills: 0 | Status: COMPLETED | OUTPATIENT
Start: 2019-05-31 | End: 2019-05-31

## 2019-05-31 RX ADMIN — INSULIN GLARGINE 15 UNIT(S): 100 INJECTION, SOLUTION SUBCUTANEOUS at 22:17

## 2019-05-31 RX ADMIN — CARVEDILOL PHOSPHATE 6.25 MILLIGRAM(S): 80 CAPSULE, EXTENDED RELEASE ORAL at 16:29

## 2019-05-31 RX ADMIN — Medication 60 MILLIGRAM(S): at 11:08

## 2019-05-31 RX ADMIN — Medication 1 BOTTLE: at 08:43

## 2019-05-31 RX ADMIN — LOSARTAN POTASSIUM 25 MILLIGRAM(S): 100 TABLET, FILM COATED ORAL at 05:33

## 2019-05-31 RX ADMIN — SODIUM CHLORIDE 50 MILLILITER(S): 9 INJECTION, SOLUTION INTRAVENOUS at 06:27

## 2019-05-31 RX ADMIN — PANTOPRAZOLE SODIUM 40 MILLIGRAM(S): 20 TABLET, DELAYED RELEASE ORAL at 16:30

## 2019-05-31 RX ADMIN — PANTOPRAZOLE SODIUM 40 MILLIGRAM(S): 20 TABLET, DELAYED RELEASE ORAL at 05:33

## 2019-05-31 RX ADMIN — IRON SUCROSE 210 MILLIGRAM(S): 20 INJECTION, SOLUTION INTRAVENOUS at 16:30

## 2019-05-31 RX ADMIN — Medication 2: at 16:31

## 2019-05-31 RX ADMIN — AMLODIPINE BESYLATE 5 MILLIGRAM(S): 2.5 TABLET ORAL at 05:34

## 2019-05-31 RX ADMIN — GABAPENTIN 100 MILLIGRAM(S): 400 CAPSULE ORAL at 22:17

## 2019-05-31 RX ADMIN — CARVEDILOL PHOSPHATE 6.25 MILLIGRAM(S): 80 CAPSULE, EXTENDED RELEASE ORAL at 05:33

## 2019-05-31 NOTE — BRIEF OPERATIVE NOTE - NSICDXBRIEFPOSTOP_GEN_ALL_CORE_FT
POST-OP DIAGNOSIS:  Internal hemorrhoids 31-May-2019 14:43:36  Tin Honeycutt  Colon, diverticulosis 31-May-2019 14:43:30  Tin Honeycutt

## 2019-05-31 NOTE — BRIEF OPERATIVE NOTE - COMMENTS
Monitor CBC  Advance diet  Antiplatelet medication resumption discussed with the primary hospitalist team

## 2019-05-31 NOTE — PROGRESS NOTE ADULT - ASSESSMENT
1. KEITH on CKD (DMN)  2. GI Bleed  3. Anemia  4. Hyperkalemia  5. DM       IVF  Control DM  Low K+ Diet   Venofer, Folic Acid  Continue current management  Stable from a renal standpoint - will sign off at this time  May recall if the need arises

## 2019-05-31 NOTE — CDI QUERY NOTE - NSCDIOTHERTXTBX_GEN_ALL_CORE_HH
Clinical documentation indicates that this patient has CHF.  Please include more specific documentation of the acuity and, type  of Heart Failure in your Progress Note and/or Discharge Summary.    SUPPORTING DOCUMENTATION AND/OR CLINICAL EVIDENCE:  Adm. with GIB  recent H admission for CHF exac, discharged 5/24  hx of diastolic CHF documented      ECHO RESULTS:      CT:  < from: CT Abdomen and Pelvis w/ Oral Cont (05.29.19 @ 20:07) >    2.  Moderate right and small left pleural effusion, similar to the prior   study with atelectasis/consolidation in the right lower lobe.   Superimposed infectious process is not excluded.      BNP:  Serum Pro-Brain Natriuretic Peptide: 5670 pg/mL <H> [0 - 300] (05-30-19)      MEDICATIONS:  IV Lasix 10mg/hr on 5/29 & 5/30      Please clarify, in progress notes and dc summary, the acuity of CHF patient treated for.     - acute on chronic diast CHF   - chronic diastolic CHF   - other   - not clinically significant      Thank you

## 2019-05-31 NOTE — DISCHARGE NOTE NURSING/CASE MANAGEMENT/SOCIAL WORK - NSDCDPATPORTLINK_GEN_ALL_CORE
You can access the [facility] Patient Portal, offered by [facility], by registering with the following website: [email or web address]

## 2019-05-31 NOTE — PROGRESS NOTE ADULT - ASSESSMENT
63 yo female with CAD , CRF, Diabetes Mellitus type and HTN admitted for rectal bleed and blood loss anemia     1- Rectal Bleed   +bleeding scan, for colonoscopy today   cont PPI , hold aspirin plavix    Hb ordered q6h yesterday called lab this am no blood drawn   stat HB still pending   s/p prep overnight . NPO     2- Anemia of acute blood loss on cronic disease anemia   s/p blood tx 1 units   cont iv venofer , hb pending     3- H/O CHD diastolic cronic   cont lasix home dose     4- HTN - controlled   cont home meds   5- CRF stage 3 with prerenal azotemia   renal input appreciated   stable   4- Diabetes Mellitus type 2   cont sliding scale insulin coverage   NPO , lantus when starts to take po 65 yo female with CAD , CRF, Diabetes Mellitus type and HTN admitted for rectal bleed and blood loss anemia     1- Rectal Bleed   +bleeding scan, for colonoscopy today   cont PPI , hold aspirin plavix    Hb ordered q6h yesterday called lab this am no blood drawn   stat HB still pending   s/p prep overnight . NPO     2- Anemia of acute blood loss on cronic disease anemia   s/p blood tx 1 units   cont iv venofer , hb pending     3- H/O CHF , diastolic cronic   cont coreg , losartan   will start lasix home dose     4- HTN - controlled   cont home meds     5- CRF stage 3 with prerenal azotemia   renal input appreciated   stable   4- Diabetes Mellitus type 2   cont sliding scale insulin coverage , lantus   controlled

## 2019-05-31 NOTE — BRIEF OPERATIVE NOTE - OPERATION/FINDINGS
Colonoscopy: Old blood noted in the colon. Colonoscopy upto cecum. Terminal visualized. No signs of active bleeding   Internal hemorrhoids

## 2019-06-01 LAB
GLUCOSE BLDC GLUCOMTR-MCNC: 127 MG/DL — HIGH (ref 70–99)
GLUCOSE BLDC GLUCOMTR-MCNC: 199 MG/DL — HIGH (ref 70–99)
GLUCOSE BLDC GLUCOMTR-MCNC: 206 MG/DL — HIGH (ref 70–99)
GLUCOSE BLDC GLUCOMTR-MCNC: 222 MG/DL — HIGH (ref 70–99)
HCT VFR BLD CALC: 29.5 % — LOW (ref 37–47)
HCT VFR BLD CALC: 30.5 % — LOW (ref 37–47)
HGB BLD-MCNC: 10.1 G/DL — LOW (ref 12–16)
HGB BLD-MCNC: 9.7 G/DL — LOW (ref 12–16)

## 2019-06-01 PROCEDURE — 99232 SBSQ HOSP IP/OBS MODERATE 35: CPT

## 2019-06-01 PROCEDURE — 99233 SBSQ HOSP IP/OBS HIGH 50: CPT

## 2019-06-01 RX ORDER — FUROSEMIDE 40 MG
40 TABLET ORAL ONCE
Refills: 0 | Status: COMPLETED | OUTPATIENT
Start: 2019-06-01 | End: 2019-06-01

## 2019-06-01 RX ADMIN — LOSARTAN POTASSIUM 25 MILLIGRAM(S): 100 TABLET, FILM COATED ORAL at 06:03

## 2019-06-01 RX ADMIN — PANTOPRAZOLE SODIUM 40 MILLIGRAM(S): 20 TABLET, DELAYED RELEASE ORAL at 17:10

## 2019-06-01 RX ADMIN — CARVEDILOL PHOSPHATE 6.25 MILLIGRAM(S): 80 CAPSULE, EXTENDED RELEASE ORAL at 06:03

## 2019-06-01 RX ADMIN — PANTOPRAZOLE SODIUM 40 MILLIGRAM(S): 20 TABLET, DELAYED RELEASE ORAL at 06:03

## 2019-06-01 RX ADMIN — Medication 2: at 17:10

## 2019-06-01 RX ADMIN — INSULIN GLARGINE 15 UNIT(S): 100 INJECTION, SOLUTION SUBCUTANEOUS at 21:38

## 2019-06-01 RX ADMIN — Medication 4: at 12:37

## 2019-06-01 RX ADMIN — GABAPENTIN 100 MILLIGRAM(S): 400 CAPSULE ORAL at 06:03

## 2019-06-01 RX ADMIN — CARVEDILOL PHOSPHATE 6.25 MILLIGRAM(S): 80 CAPSULE, EXTENDED RELEASE ORAL at 17:09

## 2019-06-01 RX ADMIN — Medication 60 MILLIGRAM(S): at 11:19

## 2019-06-01 RX ADMIN — DULOXETINE HYDROCHLORIDE 60 MILLIGRAM(S): 30 CAPSULE, DELAYED RELEASE ORAL at 11:19

## 2019-06-01 RX ADMIN — AMLODIPINE BESYLATE 5 MILLIGRAM(S): 2.5 TABLET ORAL at 06:03

## 2019-06-01 RX ADMIN — Medication 1 TABLET(S): at 11:21

## 2019-06-01 RX ADMIN — Medication 40 MILLIGRAM(S): at 17:13

## 2019-06-01 RX ADMIN — Medication 1 TABLET(S): at 17:14

## 2019-06-01 RX ADMIN — Medication 500 MILLIGRAM(S): at 11:19

## 2019-06-01 RX ADMIN — Medication 1 MILLIGRAM(S): at 11:19

## 2019-06-01 RX ADMIN — Medication 1 TABLET(S): at 07:02

## 2019-06-01 RX ADMIN — Medication 1 DROP(S): at 11:21

## 2019-06-01 RX ADMIN — GABAPENTIN 100 MILLIGRAM(S): 400 CAPSULE ORAL at 13:23

## 2019-06-01 RX ADMIN — IRON SUCROSE 210 MILLIGRAM(S): 20 INJECTION, SOLUTION INTRAVENOUS at 17:13

## 2019-06-01 RX ADMIN — GABAPENTIN 100 MILLIGRAM(S): 400 CAPSULE ORAL at 21:38

## 2019-06-01 NOTE — PROGRESS NOTE ADULT - ASSESSMENT
65 yo female with CAD , CRF, Diabetes Mellitus type and HTN admitted for rectal bleed and blood loss anemia     1- Rectal Bleed   +bleeding scan in cecum , colonoscopy performed no active bleed per GI     cont PPI , hold aspirin plavix , cardiology agree with the plan   tolerating diet     2- Anemia of acute blood loss on cronic disease anemia   s/p blood tx  further for low hb keep hb over 8.5 given her cardiac history   discharge home with oral ferrous sulfate supplement     3- Diastolic CHF cronic   cont coreg , losartan   will start lasix home dose today     4- HTN - controlled   cont home meds    5- CRF stage 3 with prerenal azotemia   renal input appreciated   stable   4- Diabetes Mellitus type 2   cont sliding scale insulin coverage , lantus   controlled     Pt ambulate pending   discharge in 48 hours if HB and bleed stable

## 2019-06-01 NOTE — CHART NOTE - NSCHARTNOTEFT_GEN_A_CORE
Notified by nursing of Hgb 7.0 Hct 21.3. No acute complaints at this time.   PHYSICAL EXAM:    Vital Signs Last 24 Hrs  T(C): 36.7 (31 May 2019 23:33), Max: 36.7 (31 May 2019 01:06)  T(F): 98.1 (31 May 2019 23:33), Max: 98.1 (31 May 2019 01:06)  HR: 78 (31 May 2019 23:33) (72 - 78)  BP: 146/64 (31 May 2019 23:33) (119/53 - 146/64)  BP(mean): --  RR: 18 (31 May 2019 23:33) (18 - 18)  SpO2: 99% (31 May 2019 23:33) (98% - 100%)            Hemoglobin: 7.0 g/dL (05-31 @ 22:52)  Hemoglobin: 8.3 g/dL (05-31 @ 09:53)  Hemoglobin: 8.7 g/dL (05-30 @ 09:23)  Hemoglobin: 8.1 g/dL (05-30 @ 07:25)  Hemoglobin: 7.6 g/dL (05-29 @ 13:52)    Hematocrit Trend: 21.3 %<--, 25.0 %<--, 26.3 %<--, 23.6 %<--    Colonoscopy today per op note: Old blood noted in the colon. Colonoscopy up to cecum. Terminal visualized. No signs of active bleeding. Internal hemorrhoids    Assessment  63 yo female with CAD , CRF, Diabetes Mellitus type and HTN admitted for rectal bleed and blood loss anemia     1- Rectal Bleed   +bleeding scan, colonoscopy today no active bleeding noted  cont PPI , hold aspirin plavix    Hb/Hct decreased sig from earlier today       2- Anemia of acute blood loss on chronic disease anemia   s/p blood tx 1 units on 5/29, per GI note 5/20 transfuse to maint Hct >24  Will transfuse 2 units now. reviewed w Dr Matta  cont iv venofer , hb pending     3- H/O CHF , diastolic cronic   cont coreg , losartan, lasix Notified by nursing of Hgb 7.0 Hct 21.3.   Denies dyspnea, lightheadedness, dizziness, chest pain, abd pain, flank pain.      PHYSICAL EXAM:    Vital Signs Last 24 Hrs  T(C): 36.7 (31 May 2019 23:33), Max: 36.7 (31 May 2019 01:06)  T(F): 98.1 (31 May 2019 23:33), Max: 98.1 (31 May 2019 01:06)  HR: 78 (31 May 2019 23:33) (72 - 78)  BP: 146/64 (31 May 2019 23:33) (119/53 - 146/64)  BP(mean): --  RR: 18 (31 May 2019 23:33) (18 - 18)  SpO2: 99% (31 May 2019 23:33) (98% - 100%)    GENERAL: Pt lying comfortably, NAD.  NECK: soft, Supple, No JVD,   CHEST/LUNG: Clear to auscultation bilaterally; No crackles, wheezing.  HEART: S1S2+, Regular rate and rhythm; No murmurs.  ABDOMEN: Soft, Nontender, Nondistended; Bowel sounds present.  EXT: no edema, cyanosis  SKIN: warm, dry  NEURO: AAOX3  BACK: no CVA tenderness    Hemoglobin: 7.0 g/dL (05-31 @ 22:52)  Hemoglobin: 8.3 g/dL (05-31 @ 09:53)  Hemoglobin: 8.7 g/dL (05-30 @ 09:23)  Hemoglobin: 8.1 g/dL (05-30 @ 07:25)  Hemoglobin: 7.6 g/dL (05-29 @ 13:52)    Hematocrit Trend: 21.3 %<--, 25.0 %<--, 26.3 %<--, 23.6 %<--    Colonoscopy today per op note: Old blood noted in the colon. Colonoscopy up to cecum. Terminal visualized. No signs of active bleeding. Internal hemorrhoids    Assessment  65 yo female with CAD , CRF, Diabetes Mellitus type and HTN admitted for rectal bleed and blood loss anemia     1- Rectal Bleed   +bleeding scan, colonoscopy today no active bleeding noted  cont PPI , hold aspirin plavix    Hb/Hct decreased sig from earlier today       2- Anemia of acute blood loss on chronic disease anemia   s/p blood tx 1 units on 5/29, per GI note 5/20 transfuse to maint Hct >24  Will transfuse 2 units now. reviewed w Dr Matta  cont iv venofer , repeat labs in AM    3- H/O CHF , diastolic chronic   cont coreg , losartan, lasix

## 2019-06-01 NOTE — PROGRESS NOTE ADULT - ASSESSMENT
Patient with most likely diverticular bleeding which has stopped spontaneously. Now doing better. Monitor CBC daily.  Hold off antiplatelets at this time  Will follow

## 2019-06-02 LAB
ANION GAP SERPL CALC-SCNC: 9 MMOL/L — SIGNIFICANT CHANGE UP (ref 5–17)
BUN SERPL-MCNC: 32 MG/DL — HIGH (ref 8–20)
CALCIUM SERPL-MCNC: 8.1 MG/DL — LOW (ref 8.6–10.2)
CHLORIDE SERPL-SCNC: 101 MMOL/L — SIGNIFICANT CHANGE UP (ref 98–107)
CO2 SERPL-SCNC: 28 MMOL/L — SIGNIFICANT CHANGE UP (ref 22–29)
CREAT SERPL-MCNC: 1.25 MG/DL — SIGNIFICANT CHANGE UP (ref 0.5–1.3)
GLUCOSE BLDC GLUCOMTR-MCNC: 118 MG/DL — HIGH (ref 70–99)
GLUCOSE BLDC GLUCOMTR-MCNC: 156 MG/DL — HIGH (ref 70–99)
GLUCOSE BLDC GLUCOMTR-MCNC: 206 MG/DL — HIGH (ref 70–99)
GLUCOSE BLDC GLUCOMTR-MCNC: 233 MG/DL — HIGH (ref 70–99)
GLUCOSE SERPL-MCNC: 165 MG/DL — HIGH (ref 70–115)
HCT VFR BLD CALC: 28.8 % — LOW (ref 37–47)
HGB BLD-MCNC: 9.5 G/DL — LOW (ref 12–16)
PHOSPHATE SERPL-MCNC: 3.1 MG/DL — SIGNIFICANT CHANGE UP (ref 2.4–4.7)
POTASSIUM SERPL-MCNC: 4 MMOL/L — SIGNIFICANT CHANGE UP (ref 3.5–5.3)
POTASSIUM SERPL-SCNC: 4 MMOL/L — SIGNIFICANT CHANGE UP (ref 3.5–5.3)
SODIUM SERPL-SCNC: 138 MMOL/L — SIGNIFICANT CHANGE UP (ref 135–145)

## 2019-06-02 PROCEDURE — 99233 SBSQ HOSP IP/OBS HIGH 50: CPT

## 2019-06-02 PROCEDURE — 99232 SBSQ HOSP IP/OBS MODERATE 35: CPT

## 2019-06-02 RX ORDER — FERROUS SULFATE 325(65) MG
325 TABLET ORAL DAILY
Refills: 0 | Status: DISCONTINUED | OUTPATIENT
Start: 2019-06-02 | End: 2019-06-03

## 2019-06-02 RX ADMIN — AMLODIPINE BESYLATE 5 MILLIGRAM(S): 2.5 TABLET ORAL at 05:25

## 2019-06-02 RX ADMIN — PANTOPRAZOLE SODIUM 40 MILLIGRAM(S): 20 TABLET, DELAYED RELEASE ORAL at 05:24

## 2019-06-02 RX ADMIN — Medication 1 TABLET(S): at 17:12

## 2019-06-02 RX ADMIN — INSULIN GLARGINE 15 UNIT(S): 100 INJECTION, SOLUTION SUBCUTANEOUS at 21:53

## 2019-06-02 RX ADMIN — Medication 1 MILLIGRAM(S): at 12:27

## 2019-06-02 RX ADMIN — LOSARTAN POTASSIUM 25 MILLIGRAM(S): 100 TABLET, FILM COATED ORAL at 05:24

## 2019-06-02 RX ADMIN — GABAPENTIN 100 MILLIGRAM(S): 400 CAPSULE ORAL at 05:25

## 2019-06-02 RX ADMIN — Medication 325 MILLIGRAM(S): at 15:33

## 2019-06-02 RX ADMIN — DULOXETINE HYDROCHLORIDE 60 MILLIGRAM(S): 30 CAPSULE, DELAYED RELEASE ORAL at 12:27

## 2019-06-02 RX ADMIN — GABAPENTIN 100 MILLIGRAM(S): 400 CAPSULE ORAL at 15:32

## 2019-06-02 RX ADMIN — Medication 60 MILLIGRAM(S): at 12:28

## 2019-06-02 RX ADMIN — Medication 1 DROP(S): at 17:12

## 2019-06-02 RX ADMIN — Medication 2: at 12:26

## 2019-06-02 RX ADMIN — PANTOPRAZOLE SODIUM 40 MILLIGRAM(S): 20 TABLET, DELAYED RELEASE ORAL at 17:14

## 2019-06-02 RX ADMIN — Medication 1 TABLET(S): at 05:25

## 2019-06-02 RX ADMIN — CARVEDILOL PHOSPHATE 6.25 MILLIGRAM(S): 80 CAPSULE, EXTENDED RELEASE ORAL at 17:13

## 2019-06-02 RX ADMIN — Medication 500 MILLIGRAM(S): at 12:27

## 2019-06-02 RX ADMIN — Medication 10 MILLIGRAM(S): at 05:24

## 2019-06-02 RX ADMIN — Medication 4: at 17:16

## 2019-06-02 RX ADMIN — Medication 1 TABLET(S): at 12:27

## 2019-06-02 RX ADMIN — GABAPENTIN 100 MILLIGRAM(S): 400 CAPSULE ORAL at 21:53

## 2019-06-02 RX ADMIN — CARVEDILOL PHOSPHATE 6.25 MILLIGRAM(S): 80 CAPSULE, EXTENDED RELEASE ORAL at 05:25

## 2019-06-02 RX ADMIN — LATANOPROST 1 DROP(S): 0.05 SOLUTION/ DROPS OPHTHALMIC; TOPICAL at 21:55

## 2019-06-02 NOTE — PROGRESS NOTE ADULT - ASSESSMENT
65 yo female with CAD , CRF, Diabetes Mellitus type and HTN admitted for rectal bleed and blood loss anemia     1- Rectal Bleed   +bleeding scan in cecum , colonoscopy performed no active bleed per GI   cont PPI , hold aspirin plavix , cardiology agree with the plan   tolerating diet   H/H stable    2- Anemia of acute blood loss on cronic disease anemia   s/p 2 PRBCs blood tx  further for low hb keep hb over 8.5 given her cardiac history   discharge home with oral ferrous sulfate supplement     3- Diastolic CHF cronic   cont coreg , losartan   will start torsemide home dose    4- HTN - controlled   cont home meds    5- CRF stage 3 with prerenal azotemia   renal input appreciated   stable   4- Diabetes Mellitus type 2   cont sliding scale insulin coverage , lantus   controlled     UTI/ cystitis- weakly positive UA, Ucx not done, RN notified, c/w bactrim x 3 days    Pt ambulate pending   discharge in 24 hours if HB and bleed stable

## 2019-06-02 NOTE — PROGRESS NOTE ADULT - ASSESSMENT
GI bleeding has stopped. Most likely diverticular bleeding. Resumption of the antiplatelet therapy as per the cardiology team. Call GI prn

## 2019-06-03 ENCOUNTER — TRANSCRIPTION ENCOUNTER (OUTPATIENT)
Age: 64
End: 2019-06-03

## 2019-06-03 VITALS — OXYGEN SATURATION: 94 % | RESPIRATION RATE: 20 BRPM

## 2019-06-03 LAB
ANION GAP SERPL CALC-SCNC: 9 MMOL/L — SIGNIFICANT CHANGE UP (ref 5–17)
BASOPHILS # BLD AUTO: 0 K/UL — SIGNIFICANT CHANGE UP (ref 0–0.2)
BASOPHILS NFR BLD AUTO: 0.5 % — SIGNIFICANT CHANGE UP (ref 0–2)
BUN SERPL-MCNC: 29 MG/DL — HIGH (ref 8–20)
CALCIUM SERPL-MCNC: 8.2 MG/DL — LOW (ref 8.6–10.2)
CHLORIDE SERPL-SCNC: 102 MMOL/L — SIGNIFICANT CHANGE UP (ref 98–107)
CO2 SERPL-SCNC: 27 MMOL/L — SIGNIFICANT CHANGE UP (ref 22–29)
CREAT SERPL-MCNC: 1.33 MG/DL — HIGH (ref 0.5–1.3)
EOSINOPHIL # BLD AUTO: 0.5 K/UL — SIGNIFICANT CHANGE UP (ref 0–0.5)
EOSINOPHIL NFR BLD AUTO: 6.4 % — HIGH (ref 0–6)
GLUCOSE BLDC GLUCOMTR-MCNC: 112 MG/DL — HIGH (ref 70–99)
GLUCOSE BLDC GLUCOMTR-MCNC: 152 MG/DL — HIGH (ref 70–99)
GLUCOSE SERPL-MCNC: 108 MG/DL — SIGNIFICANT CHANGE UP (ref 70–115)
HCT VFR BLD CALC: 26.4 % — LOW (ref 37–47)
HGB BLD-MCNC: 8.9 G/DL — LOW (ref 12–16)
LYMPHOCYTES # BLD AUTO: 1.6 K/UL — SIGNIFICANT CHANGE UP (ref 1–4.8)
LYMPHOCYTES # BLD AUTO: 20.3 % — SIGNIFICANT CHANGE UP (ref 20–55)
MAGNESIUM SERPL-MCNC: 2.2 MG/DL — SIGNIFICANT CHANGE UP (ref 1.6–2.6)
MCHC RBC-ENTMCNC: 29.3 PG — SIGNIFICANT CHANGE UP (ref 27–31)
MCHC RBC-ENTMCNC: 33.7 G/DL — SIGNIFICANT CHANGE UP (ref 32–36)
MCV RBC AUTO: 86.8 FL — SIGNIFICANT CHANGE UP (ref 81–99)
MONOCYTES # BLD AUTO: 0.5 K/UL — SIGNIFICANT CHANGE UP (ref 0–0.8)
MONOCYTES NFR BLD AUTO: 6.7 % — SIGNIFICANT CHANGE UP (ref 3–10)
NEUTROPHILS # BLD AUTO: 5.4 K/UL — SIGNIFICANT CHANGE UP (ref 1.8–8)
NEUTROPHILS NFR BLD AUTO: 66 % — SIGNIFICANT CHANGE UP (ref 37–73)
PHOSPHATE SERPL-MCNC: 2.9 MG/DL — SIGNIFICANT CHANGE UP (ref 2.4–4.7)
PLATELET # BLD AUTO: 258 K/UL — SIGNIFICANT CHANGE UP (ref 150–400)
POTASSIUM SERPL-MCNC: 4.6 MMOL/L — SIGNIFICANT CHANGE UP (ref 3.5–5.3)
POTASSIUM SERPL-SCNC: 4.6 MMOL/L — SIGNIFICANT CHANGE UP (ref 3.5–5.3)
RBC # BLD: 3.04 M/UL — LOW (ref 4.4–5.2)
RBC # FLD: 17 % — HIGH (ref 11–15.6)
SODIUM SERPL-SCNC: 138 MMOL/L — SIGNIFICANT CHANGE UP (ref 135–145)
WBC # BLD: 8.1 K/UL — SIGNIFICANT CHANGE UP (ref 4.8–10.8)
WBC # FLD AUTO: 8.1 K/UL — SIGNIFICANT CHANGE UP (ref 4.8–10.8)

## 2019-06-03 PROCEDURE — 82310 ASSAY OF CALCIUM: CPT

## 2019-06-03 PROCEDURE — 83970 ASSAY OF PARATHORMONE: CPT

## 2019-06-03 PROCEDURE — P9016: CPT

## 2019-06-03 PROCEDURE — 87086 URINE CULTURE/COLONY COUNT: CPT

## 2019-06-03 PROCEDURE — 99239 HOSP IP/OBS DSCHRG MGMT >30: CPT

## 2019-06-03 PROCEDURE — 82607 VITAMIN B-12: CPT

## 2019-06-03 PROCEDURE — 96375 TX/PRO/DX INJ NEW DRUG ADDON: CPT

## 2019-06-03 PROCEDURE — 85730 THROMBOPLASTIN TIME PARTIAL: CPT

## 2019-06-03 PROCEDURE — 36430 TRANSFUSION BLD/BLD COMPNT: CPT

## 2019-06-03 PROCEDURE — 99285 EMERGENCY DEPT VISIT HI MDM: CPT | Mod: 25

## 2019-06-03 PROCEDURE — 82306 VITAMIN D 25 HYDROXY: CPT

## 2019-06-03 PROCEDURE — 78278 ACUTE GI BLOOD LOSS IMAGING: CPT

## 2019-06-03 PROCEDURE — 85027 COMPLETE CBC AUTOMATED: CPT

## 2019-06-03 PROCEDURE — 82570 ASSAY OF URINE CREATININE: CPT

## 2019-06-03 PROCEDURE — 83735 ASSAY OF MAGNESIUM: CPT

## 2019-06-03 PROCEDURE — 84156 ASSAY OF PROTEIN URINE: CPT

## 2019-06-03 PROCEDURE — 86901 BLOOD TYPING SEROLOGIC RH(D): CPT

## 2019-06-03 PROCEDURE — 85018 HEMOGLOBIN: CPT

## 2019-06-03 PROCEDURE — 85014 HEMATOCRIT: CPT

## 2019-06-03 PROCEDURE — 84100 ASSAY OF PHOSPHORUS: CPT

## 2019-06-03 PROCEDURE — 93005 ELECTROCARDIOGRAM TRACING: CPT

## 2019-06-03 PROCEDURE — 99232 SBSQ HOSP IP/OBS MODERATE 35: CPT

## 2019-06-03 PROCEDURE — 80048 BASIC METABOLIC PNL TOTAL CA: CPT

## 2019-06-03 PROCEDURE — 74176 CT ABD & PELVIS W/O CONTRAST: CPT

## 2019-06-03 PROCEDURE — 82272 OCCULT BLD FECES 1-3 TESTS: CPT

## 2019-06-03 PROCEDURE — 83550 IRON BINDING TEST: CPT

## 2019-06-03 PROCEDURE — 94640 AIRWAY INHALATION TREATMENT: CPT

## 2019-06-03 PROCEDURE — 82728 ASSAY OF FERRITIN: CPT

## 2019-06-03 PROCEDURE — 76770 US EXAM ABDO BACK WALL COMP: CPT

## 2019-06-03 PROCEDURE — 86900 BLOOD TYPING SEROLOGIC ABO: CPT

## 2019-06-03 PROCEDURE — 82962 GLUCOSE BLOOD TEST: CPT

## 2019-06-03 PROCEDURE — 83880 ASSAY OF NATRIURETIC PEPTIDE: CPT

## 2019-06-03 PROCEDURE — 96374 THER/PROPH/DIAG INJ IV PUSH: CPT

## 2019-06-03 PROCEDURE — 80069 RENAL FUNCTION PANEL: CPT

## 2019-06-03 PROCEDURE — 87186 SC STD MICRODIL/AGAR DIL: CPT

## 2019-06-03 PROCEDURE — 36415 COLL VENOUS BLD VENIPUNCTURE: CPT

## 2019-06-03 PROCEDURE — 85610 PROTHROMBIN TIME: CPT

## 2019-06-03 PROCEDURE — 86850 RBC ANTIBODY SCREEN: CPT

## 2019-06-03 PROCEDURE — 97163 PT EVAL HIGH COMPLEX 45 MIN: CPT

## 2019-06-03 PROCEDURE — 81001 URINALYSIS AUTO W/SCOPE: CPT

## 2019-06-03 PROCEDURE — 83540 ASSAY OF IRON: CPT

## 2019-06-03 PROCEDURE — 84466 ASSAY OF TRANSFERRIN: CPT

## 2019-06-03 PROCEDURE — 86923 COMPATIBILITY TEST ELECTRIC: CPT

## 2019-06-03 PROCEDURE — A9560: CPT

## 2019-06-03 PROCEDURE — 80053 COMPREHEN METABOLIC PANEL: CPT

## 2019-06-03 RX ADMIN — Medication 500 MILLIGRAM(S): at 12:09

## 2019-06-03 RX ADMIN — AMLODIPINE BESYLATE 5 MILLIGRAM(S): 2.5 TABLET ORAL at 05:13

## 2019-06-03 RX ADMIN — Medication 1 TABLET(S): at 05:25

## 2019-06-03 RX ADMIN — Medication 325 MILLIGRAM(S): at 12:09

## 2019-06-03 RX ADMIN — Medication 2: at 12:08

## 2019-06-03 RX ADMIN — DULOXETINE HYDROCHLORIDE 60 MILLIGRAM(S): 30 CAPSULE, DELAYED RELEASE ORAL at 12:09

## 2019-06-03 RX ADMIN — PANTOPRAZOLE SODIUM 40 MILLIGRAM(S): 20 TABLET, DELAYED RELEASE ORAL at 05:14

## 2019-06-03 RX ADMIN — Medication 10 MILLIGRAM(S): at 05:13

## 2019-06-03 RX ADMIN — CARVEDILOL PHOSPHATE 6.25 MILLIGRAM(S): 80 CAPSULE, EXTENDED RELEASE ORAL at 05:13

## 2019-06-03 RX ADMIN — GABAPENTIN 100 MILLIGRAM(S): 400 CAPSULE ORAL at 12:09

## 2019-06-03 RX ADMIN — LOSARTAN POTASSIUM 25 MILLIGRAM(S): 100 TABLET, FILM COATED ORAL at 05:13

## 2019-06-03 RX ADMIN — Medication 1 MILLIGRAM(S): at 12:09

## 2019-06-03 RX ADMIN — Medication 1 TABLET(S): at 12:09

## 2019-06-03 RX ADMIN — Medication 60 MILLIGRAM(S): at 12:08

## 2019-06-03 RX ADMIN — GABAPENTIN 100 MILLIGRAM(S): 400 CAPSULE ORAL at 05:13

## 2019-06-03 NOTE — DISCHARGE NOTE PROVIDER - HOSPITAL COURSE
65 yo female with CAD (on DAPT) CRF, Diabetes Mellitus type and HTN admitted for rectal bleed and blood loss anemia. Received 2 units PRBC. CT abdomen without contrast performed which showed large amount of stool in the colon. Seen by GI. s/p colonoscopy, no visualized active bleeding, some internal hemorrhoids. Most likely diverticular bleed, bleeding had ceased spontaneously during hospital stay. Pt was seen by cardiology who recommended to hold aspirn/plavix until follow up in the office. Hemoglobin has been stable.  Pt is cleared for discharge home. 63 yo female with CAD (on DAPT) CRF, Diabetes Mellitus type and HTN admitted for rectal bleed and blood loss anemia. Received 2 units PRBC. CT abdomen without contrast performed which showed large amount of stool in the colon. Seen by GI. s/p colonoscopy, no visualized active bleeding, some internal hemorrhoids. Most likely diverticular bleed, bleeding had ceased spontaneously during hospital stay. Pt was seen by cardiology who recommended to hold aspirn/plavix until follow up in the office as per cardiology. Hemoglobin has been stable.  Pt is cleared for discharge home.             PHYSICAL EXAM:            Constitutional: awake alert , no distress         Neck: supple , no JVD             Respiratory: CTA bilateral anteriorly         Cardiovascular: regular rate S1 /S2         Gastrointestinal: soft no tenderness , BS positive         Extremities: no pretibial edema

## 2019-06-03 NOTE — PROGRESS NOTE ADULT - ASSESSMENT
This is a 64-year-old woman for whom GI was consulted for rectal bleeding.  Patient underwent a colonoscopy on Friday that revealed only old blood and no signs of active bleeding.  Terminal ileum intubated and was clear.  Bleeding thought to most likely be secondary to diverticulosis.  Hemoglobin has been stable.  No objection to antiplatelet therapy.  No further workup planned from GI perspective.    GI will sign off.  Please reconsult as needed and call with any questions or concerns.     RABIA Thompson MD  [facility]  Gastroenterology at [place]  [phone]

## 2019-06-03 NOTE — PHYSICAL THERAPY INITIAL EVALUATION ADULT - LEVEL OF INDEPENDENCE: STAIR NEGOTIATION, REHAB EVAL
(pt requires assist for RW and O2 tank management due to need for 2 hand support on rails)/supervision

## 2019-06-03 NOTE — PHYSICAL THERAPY INITIAL EVALUATION ADULT - LIGHT TOUCH SENSATION, RLE, REHAB EVAL
able to locate dorsal and plantar aspect of foot. unable to locate on medial/lateral malleoli or toes/moderate impairment

## 2019-06-03 NOTE — PHYSICAL THERAPY INITIAL EVALUATION ADULT - ADDITIONAL COMMENTS
pt states she lives with her , daughter, and sister in a high ranch style house with 15 steps to enter. (2 rails on outside steps, left rail inside). pt states she uses a RW for amb and has home O2(2l/min). pt states she is alone when family is at work, and that she does not go out alone. states family assists with O2 and RW for stairs.

## 2019-06-03 NOTE — DISCHARGE NOTE PROVIDER - PROVIDER TOKENS
PROVIDER:[TOKEN:[[id number]]],FREE:[LAST:[Donna],FIRST:[Bernardo],PHONE:[(   )    -],FAX:[(   )    -],ADDRESS:[[phone]  Gastroenterology]] PROVIDER:[TOKEN:[[id number]],FOLLOWUP:[1 week]],FREE:[LAST:[Donna],FIRST:[Bernardo],PHONE:[(   )    -],FAX:[(   )    -],ADDRESS:[[phone]  Gastroenterology],FOLLOWUP:[2 weeks]]

## 2019-06-03 NOTE — DISCHARGE NOTE PROVIDER - CARE PROVIDER_API CALL
[place])  Cardiovascular Disease; Interventional Cardiology  [address] [facility] [address] [facility], [address]  [place] [address]  Phone: [phone]  Fax: [phone]  Follow Up Time:     Bernardo Thompson  [phone]  Gastroenterology  Phone: (   )    -  Fax: (   )    -  Follow Up Time: [place])  Cardiovascular Disease; Interventional Cardiology  [address], [address]  [place] [address]  Phone: [phone]  Fax: [phone]  Follow Up Time: 1 week    Bernardo Thompson  [phone]  Gastroenterology  Phone: (   )    -  Fax: (   )    -  Follow Up Time: 2 weeks

## 2019-06-03 NOTE — PHYSICAL THERAPY INITIAL EVALUATION ADULT - PERTINENT HX OF CURRENT PROBLEM, REHAB EVAL
65 yo female with multiple medical problems recently started on aspirin /plavix presented to the ED with c/o red blood from rectum . Pt had noticed loose BM this am with red and dark color blood per rectum with BM , had few since this morning , she denies any abdominal pain , no nausea , she was at [facility] discharge ed on 5/24 for CHF exacerbation , she has [employer] oxygen dependent .

## 2019-06-03 NOTE — PROGRESS NOTE ADULT - REASON FOR ADMISSION
rectal bleed

## 2019-06-03 NOTE — PROGRESS NOTE ADULT - SUBJECTIVE AND OBJECTIVE BOX
Chief Complaint: This is a 64y old Female patient being seen for follow-up consultation for rectal bleeding.    HPI / 24H events:  Patient denies any further episodes of rectal bleeding.  No bowel movements in two days.  Has no complaints.    ROS: A 14-point review of systems was reviewed and was otherwise negative save what was reported in the HPI.    PAST MEDICAL/SURGICAL HISTORY:  Congestive heart failure, unspecified HF chronicity, unspecified heart failure type  Insomnia  Retinal detachment  CAD (coronary artery disease)  Glaucoma  Urinary retention  Peripheral neuropathy  GERD (gastroesophageal reflux disease)  Depression  Diabetes  Hypertension, unspecified type  Herniated disc, cervical  PAD (peripheral artery disease)  Legally blind  History of peripheral vascular disease  History of retinal detachment  History of CEA (carotid endarterectomy): Right  Hyperlipidemia  Glaucoma  Hypertension  Diabetes  No significant past surgical history  S/P CABG x 1  After cataract  Uveitic glaucoma of both eyes  Detached retina  Atherosclerosis of right carotid artery   delivery delivered    MEDICATIONS  (STANDING):  amLODIPine   Tablet 5 milliGRAM(s) Oral daily  ascorbic acid 500 milliGRAM(s) Oral daily  carvedilol 6.25 milliGRAM(s) Oral every 12 hours  dextrose 5%. 1000 milliLiter(s) (50 mL/Hr) IV Continuous <Continuous>  dextrose 50% Injectable 12.5 Gram(s) IV Push once  dextrose 50% Injectable 25 Gram(s) IV Push once  dextrose 50% Injectable 25 Gram(s) IV Push once  DULoxetine 60 milliGRAM(s) Oral daily  ferrous    sulfate 325 milliGRAM(s) Oral daily  folic acid 1 milliGRAM(s) Oral daily  gabapentin 100 milliGRAM(s) Oral three times a day  insulin glargine Injectable (LANTUS) 15 Unit(s) SubCutaneous at bedtime  insulin lispro (HumaLOG) corrective regimen sliding scale   SubCutaneous three times a day before meals  isosorbide   mononitrate ER Tablet (IMDUR) 60 milliGRAM(s) Oral daily  latanoprost 0.005% Ophthalmic Solution 1 Drop(s) Left EYE at bedtime  losartan 25 milliGRAM(s) Oral daily  multivitamin 1 Tablet(s) Oral daily  pantoprazole  Injectable 40 milliGRAM(s) IV Push two times a day  pilocarpine 4% Solution 1 Drop(s) Both EYES four times a day  torsemide 10 milliGRAM(s) Oral daily  trimethoprim  160 mG/sulfamethoxazole 800 mG 1 Tablet(s) Oral two times a day    MEDICATIONS  (PRN):  dextrose 40% Gel 15 Gram(s) Oral once PRN Blood Glucose LESS THAN 70 milliGRAM(s)/deciliter  glucagon  Injectable 1 milliGRAM(s) IntraMuscular once PRN Glucose LESS THAN 70 milligrams/deciliter    VITAL SIGNS LAST 24 HOURS:  T(C): 36.6 (2019 05:10), Max: 36.6 (2019 00:14)  T(F): 97.9 (2019 05:10), Max: 97.9 (2019 05:10)  HR: 78 (2019 05:10) (74 - 78)  BP: 138/73 (2019 05:10) (130/61 - 139/75)  BP(mean): --  RR: 18 (2019 05:10) (18 - 19)  SpO2: 98% (2019 05:10) (98% - 99%)      19 @ 07:01  -  19 @ 07:00  --------------------------------------------------------  IN: 240 mL / OUT: 900 mL / NET: -660 mL      PHYSICAL EXAM:  Constitutional: Well-developed, well-nourished, in no apparent distress  Eyes: Sclerae anicteric, conjunctivae normal  ENMT: Mucus membranes moist, no oropharyngeal thrush noted  Neck: No thyroid nodules appreciated, no significant cervical or supraclavicular lymphadenopathy  Respiratory: Breathing nonlabored; clear to auscultation  Cardiovascular: Regular rate and rhythm  Gastrointestinal: Soft, nontender, nondistended, normoactive bowel sounds; no hepatosplenomegaly appreciated; no rebound tenderness or involuntary guarding  Extremities: No clubbing, cyanosis or edema  Neurological: Alert and oriented to person, place and time; no asterixis  Skin: No jaundice  Lymph Nodes: No significant lymphadenopathy  Musculoskeletal: No significant peripheral atrophy  Psychiatric: Affect and mood appropriate                            9.5    x     )-----------( x        ( 2019 05:47 )             28.8         138  |  101  |  32.0<H>  ----------------------------<  165<H>  4.0   |  28.0  |  1.25    Ca    8.1<L>      2019 05:47  Phos  3.1     06-02
Full note to follow.     Patient with GI bleeding. History of CAD and CHF.   Has been euvolumic. Will check cardiomems numbers today.     Hold any diuretics today. Restart home diuretic regimen tomorrow.     Patient low risk for planned GI procedures.     Hold aspirin and plavix.     Follow up outpatient for restarting aspirin.
INTERVAL HPI/OVERNIGHT EVENTS:FU after colonoscopy. No additional complaints. No more bleeding reported.     MEDICATIONS  (STANDING):  amLODIPine   Tablet 5 milliGRAM(s) Oral daily  ascorbic acid 500 milliGRAM(s) Oral daily  carvedilol 6.25 milliGRAM(s) Oral every 12 hours  dextrose 5%. 1000 milliLiter(s) (50 mL/Hr) IV Continuous <Continuous>  dextrose 50% Injectable 12.5 Gram(s) IV Push once  dextrose 50% Injectable 25 Gram(s) IV Push once  dextrose 50% Injectable 25 Gram(s) IV Push once  DULoxetine 60 milliGRAM(s) Oral daily  folic acid 1 milliGRAM(s) Oral daily  gabapentin 100 milliGRAM(s) Oral three times a day  insulin glargine Injectable (LANTUS) 15 Unit(s) SubCutaneous at bedtime  insulin lispro (HumaLOG) corrective regimen sliding scale   SubCutaneous three times a day before meals  iron sucrose IVPB 100 milliGRAM(s) IV Intermittent every 24 hours  isosorbide   mononitrate ER Tablet (IMDUR) 60 milliGRAM(s) Oral daily  latanoprost 0.005% Ophthalmic Solution 1 Drop(s) Left EYE at bedtime  losartan 25 milliGRAM(s) Oral daily  multivitamin 1 Tablet(s) Oral daily  pantoprazole  Injectable 40 milliGRAM(s) IV Push two times a day  pilocarpine 4% Solution 1 Drop(s) Both EYES four times a day  trimethoprim  160 mG/sulfamethoxazole 800 mG 1 Tablet(s) Oral two times a day    MEDICATIONS  (PRN):  dextrose 40% Gel 15 Gram(s) Oral once PRN Blood Glucose LESS THAN 70 milliGRAM(s)/deciliter  glucagon  Injectable 1 milliGRAM(s) IntraMuscular once PRN Glucose LESS THAN 70 milligrams/deciliter      Allergies    Allergy Status Unknown  No Known Allergies    Intolerances        Vital Signs Last 24 Hrs  T(C): 36.5 (2019 08:19), Max: 36.8 (2019 05:57)  T(F): 97.7 (2019 08:19), Max: 98.2 (2019 05:57)  HR: 74 (2019 08:19) (74 - 81)  BP: 125/72 (2019 08:19) (109/65 - 146/64)  BP(mean): --  RR: 18 (2019 08:19) (17 - 18)  SpO2: 97% (2019 08:19) (97% - 99%)    LABS:                        10.1   x     )-----------( x        ( 2019 11:46 )             30.5     05-31    143  |  97<L>  |  42.0<H>  ----------------------------<  111  3.6   |  33.0<H>  |  0.98    Ca    8.6      31 May 2019 09:53        Urinalysis Basic - ( 31 May 2019 21:02 )    Color: Yellow / Appearance: very cloudy / S.010 / pH: x  Gluc: x / Ketone: Negative  / Bili: Negative / Urobili: 1 mg/dL   Blood: x / Protein: 30 mg/dL / Nitrite: Negative   Leuk Esterase: Moderate / RBC: 6-10 /HPF / WBC >50   Sq Epi: x / Non Sq Epi: Occasional / Bacteria: Few        RADIOLOGY & ADDITIONAL TESTS:
INTERVAL HPI/OVERNIGHT EVENTS:FU for GI bleeding. Most likely diverticular bleeding. NO more bleeding. hct stable now.     MEDICATIONS  (STANDING):  amLODIPine   Tablet 5 milliGRAM(s) Oral daily  ascorbic acid 500 milliGRAM(s) Oral daily  carvedilol 6.25 milliGRAM(s) Oral every 12 hours  dextrose 5%. 1000 milliLiter(s) (50 mL/Hr) IV Continuous <Continuous>  dextrose 50% Injectable 12.5 Gram(s) IV Push once  dextrose 50% Injectable 25 Gram(s) IV Push once  dextrose 50% Injectable 25 Gram(s) IV Push once  DULoxetine 60 milliGRAM(s) Oral daily  ferrous    sulfate 325 milliGRAM(s) Oral daily  folic acid 1 milliGRAM(s) Oral daily  gabapentin 100 milliGRAM(s) Oral three times a day  insulin glargine Injectable (LANTUS) 15 Unit(s) SubCutaneous at bedtime  insulin lispro (HumaLOG) corrective regimen sliding scale   SubCutaneous three times a day before meals  isosorbide   mononitrate ER Tablet (IMDUR) 60 milliGRAM(s) Oral daily  latanoprost 0.005% Ophthalmic Solution 1 Drop(s) Left EYE at bedtime  losartan 25 milliGRAM(s) Oral daily  multivitamin 1 Tablet(s) Oral daily  pantoprazole  Injectable 40 milliGRAM(s) IV Push two times a day  pilocarpine 4% Solution 1 Drop(s) Both EYES four times a day  torsemide 10 milliGRAM(s) Oral daily  trimethoprim  160 mG/sulfamethoxazole 800 mG 1 Tablet(s) Oral two times a day    MEDICATIONS  (PRN):  dextrose 40% Gel 15 Gram(s) Oral once PRN Blood Glucose LESS THAN 70 milliGRAM(s)/deciliter  glucagon  Injectable 1 milliGRAM(s) IntraMuscular once PRN Glucose LESS THAN 70 milligrams/deciliter      Allergies    Allergy Status Unknown  No Known Allergies    Intolerances        Vital Signs Last 24 Hrs  T(C): 36.5 (2019 08:12), Max: 36.9 (2019 00:00)  T(F): 97.7 (2019 08:12), Max: 98.4 (2019 00:00)  HR: 74 (2019 08:12) (74 - 78)  BP: 130/61 (2019 08:12) (118/56 - 145/71)  BP(mean): --  RR: 19 (2019 08:12) (17 - 19)  SpO2: 98% (2019 08:12) (95% - 98%)    LABS:                        9.5    x     )-----------( x        ( 2019 05:47 )             28.8     06-02    138  |  101  |  32.0<H>  ----------------------------<  165<H>  4.0   |  28.0  |  1.25    Ca    8.1<L>      2019 05:47  Phos  3.1     06-02        Urinalysis Basic - ( 31 May 2019 21:02 )    Color: Yellow / Appearance: very cloudy / S.010 / pH: x  Gluc: x / Ketone: Negative  / Bili: Negative / Urobili: 1 mg/dL   Blood: x / Protein: 30 mg/dL / Nitrite: Negative   Leuk Esterase: Moderate / RBC: 6-10 /HPF / WBC >50   Sq Epi: x / Non Sq Epi: Occasional / Bacteria: Few        RADIOLOGY & ADDITIONAL TESTS:
INTERVAL HPI/OVERNIGHT EVENTS:FU for GI bleeding. She had 1 unit of PRBC transfused overnight. She is on lasix. Had 2-3 bloody BM overnight. CT abdomen without contrast performed which showed large amount of stool in the colon. Breathing is better. vital signs are stable.    MEDICATIONS  (STANDING):  ALBUTerol/ipratropium for Nebulization 3 milliLiter(s) Nebulizer every 6 hours  amLODIPine   Tablet 5 milliGRAM(s) Oral daily  ascorbic acid 500 milliGRAM(s) Oral daily  carvedilol 6.25 milliGRAM(s) Oral every 12 hours  dextrose 5%. 1000 milliLiter(s) (50 mL/Hr) IV Continuous <Continuous>  dextrose 50% Injectable 12.5 Gram(s) IV Push once  dextrose 50% Injectable 25 Gram(s) IV Push once  dextrose 50% Injectable 25 Gram(s) IV Push once  DULoxetine 60 milliGRAM(s) Oral daily  furosemide Infusion 10 mG/Hr (5 mL/Hr) IV Continuous <Continuous>  gabapentin 100 milliGRAM(s) Oral three times a day  insulin glargine Injectable (LANTUS) 15 Unit(s) SubCutaneous at bedtime  insulin lispro (HumaLOG) corrective regimen sliding scale   SubCutaneous three times a day before meals  isosorbide   mononitrate ER Tablet (IMDUR) 60 milliGRAM(s) Oral daily  latanoprost 0.005% Ophthalmic Solution 1 Drop(s) Left EYE at bedtime  losartan 25 milliGRAM(s) Oral daily  multivitamin 1 Tablet(s) Oral daily  pilocarpine 4% Solution 1 Drop(s) Both EYES four times a day    MEDICATIONS  (PRN):  dextrose 40% Gel 15 Gram(s) Oral once PRN Blood Glucose LESS THAN 70 milliGRAM(s)/deciliter  glucagon  Injectable 1 milliGRAM(s) IntraMuscular once PRN Glucose LESS THAN 70 milligrams/deciliter      Allergies    Allergy Status Unknown  No Known Allergies    Intolerances        Vital Signs Last 24 Hrs  T(C): 36.7 (30 May 2019 02:15), Max: 37.4 (29 May 2019 22:39)  T(F): 98 (30 May 2019 02:15), Max: 99.4 (29 May 2019 22:39)  HR: 77 (30 May 2019 04:44) (76 - 80)  BP: 131/72 (30 May 2019 04:44) (127/70 - 151/67)  BP(mean): --  RR: 20 (30 May 2019 02:15) (18 - 22)  SpO2: 98% (30 May 2019 03:25) (76% - 100%)    LABS:                        7.6    7.6   )-----------( 271      ( 29 May 2019 13:52 )             22.9     05-29    137  |  96<L>  |  52.0<H>  ----------------------------<  176<H>  6.1<HH>   |  30.0<H>  |  1.65<H>    Ca    8.7      29 May 2019 13:52    TPro  7.0  /  Alb  3.2<L>  /  TBili  0.6  /  DBili  x   /  AST  40<H>  /  ALT  51<H>  /  AlkPhos  187<H>  05-29    PT/INR - ( 29 May 2019 13:52 )   PT: 14.3 sec;   INR: 1.24 ratio         PTT - ( 29 May 2019 13:52 )  PTT:31.4 sec      RADIOLOGY & ADDITIONAL TESTS:  < from: CT Abdomen and Pelvis w/ Oral Cont (05.29.19 @ 20:07) >     EXAM:  CT ABDOMEN AND PELVIS OC                          PROCEDURE DATE:  05/29/2019          INTERPRETATION:  CT ABDOMEN AND PELVIS WITHOUT IV CONTRAST (ORAL ONLY)    Clinical Indication: Rectal bleeding with saw bowel movement r/o colitis    Technique: Axial CT images of the abdomen and pelvis were obtained from   the lung bases to the pubic symphysis without IV contrast. Oral contrast   was administered. Coronal and sagittal reformatted images were created   and reviewed.       Comparison:Limited correlation is made to a CT of the chest from   5/19/2019. Ultrasound of the kidneys and bladder from earlier today,   5/29/2019. CT of the abdomen and pelvis from 11/25/2018.    Findings:  The included lung bases demonstrate a moderate rightand small left   pleural effusion, similar to the prior study with   atelectasis/consolidation in the right lower lobe.  Coronary artery   calcifications are noted. The heart is mildly enlarged.    The unenhanced liver, spleen, pancreas, and adrenal glands are   unremarkable. A 1 cm accessory splenule is stable. The left kidney is   unremarkable aside for renovascular calcifications and a 0.8 cm exophytic   hypodensity at the anterior interpolar region which is not characterized   on the current exam but probably represents a cyst.. There are a few 3 to   4 mm nonobstructing right renal calculi and/or renovascular   calcifications. A 3 mm phlebolith within a vein adjacent to the right   ureter is stable. The gallbladder has a grossly unremarkable CT   appearance. Somewhat limited evaluation of the bowel without IV contrast.   Within this limitation, there no evidence for bowel inflammation. There   is no evidence for bowel obstruction. Abundant stool is seen in the right   colon and transverse colon. A normal appendix is identified.    There is no abdominopelvic lymphadenopathy, free fluid or pelvic mass.   Moderate to severe aortoiliac atherosclerotic disease is seen without   aneurysm. Vascular calcifications extend to visceral branches. There is a   small fat-containing umbilical hernia. The urinary bladder is markedly   distended but has a grossly unremarkable CT appearance.    No aggressive osseous lesion is identified. Median sternotomy wires are   partially visualized.    Multiple nonspecific soft tissue nodules are seen in the anterior   abdominal subcutaneous fat, which could represent sequelae of   subcutaneous injections. There is diffuse anasarca.    There is a 3.8 cm fluid collection in the left groin with surrounding   surgical clips (image 151, series 3), which is slightly smaller from 2018   when it measured up to 4.3 cm, and probably represents a postoperative   seroma; pseudoaneurysm is considered less likely however can be excluded   sonographically. Cannot reliably exclude the possibility of   pseudoaneurysm from endovascular procedure. Consider sonographic   correlation.    IMPRESSION:  1.  Somewhat limited evaluation of the bowel without IV contrast. Within   this limitation, there no evidence forbowel inflammation. There is no   evidence for bowel obstruction. Abundant stool is seen in the right colon   and transverse colon. Clinical correlation to determine the need for   additional imaging, possibly with IV contrast and/or further evaluation   with colonoscopy.    2.  Moderate right and small left pleural effusion, similar to the prior   study with atelectasis/consolidation in the right lower lobe.   Superimposed infectious process is not excluded.    3.  Coronary artery calcificationsare noted.     4.  Renal vascular calcifications and/or nonobstructing nephrolithiasis.    5.  Normal appendix.    6.  Markedly distended urinary bladder.      7.  A  3.8 cm fluid collection in the left groin with surrounding   surgical clips (image 151, series 3), which is slightly smaller from 2018   when it measured up to 4.3 cm, and probably represents a postoperative   seroma; pseudoaneurysm is considered less likely however can be excluded   sonographically.     8.  Other findings, as above.                    GEORGIANA BOSS   This document has been electronically signed. May 29 2019  8:46PM                  < end of copied text >
Internal Medicine Hospitalist Progress Note      follow up for rectal bleed , bleeding scan positive   still having BM bloody . for colonoscopy today   denies shortness of breath , no chest pain , abdominal pain       ROS: as above, all remaining ROS are negative.   Vital Signs Last 24 Hrs  T(C): 36.5 (31 May 2019 07:56), Max: 36.7 (31 May 2019 01:06)  T(F): 97.7 (31 May 2019 07:56), Max: 98.1 (31 May 2019 01:06)  HR: 72 (31 May 2019 07:56) (72 - 78)  BP: 119/53 (31 May 2019 07:56) (119/53 - 153/78)  BP(mean): --  RR: 18 (31 May 2019 07:56) (18 - 18)  SpO2: 99% (31 May 2019 07:56) (98% - 100%)      PHYSICAL EXAM:      Constitutional: awake alert , no distress     Neck: supple , no JVD       Respiratory: CTA bilateral anteriorly     Cardiovascular: regular rate S1 /S2     Gastrointestinal: soft no tenderness , BS positive     Extremities: no pretibial edema     CAPILLARY BLOOD GLUCOSE      POCT Blood Glucose.: 87 mg/dL (31 May 2019 05:45)  POCT Blood Glucose.: 364 mg/dL (30 May 2019 22:25)  POCT Blood Glucose.: 410 mg/dL (30 May 2019 22:05)  POCT Blood Glucose.: 251 mg/dL (30 May 2019 17:19)  POCT Blood Glucose.: 115 mg/dL (30 May 2019 13:58)          LABS:                                        8.7    7.8   )-----------( 300      ( 30 May 2019 09:23 )             26.3   05-30    140  |  97<L>  |  47.0<H>  ----------------------------<  87  4.6   |  30.0<H>  |  1.18    Ca    9.0      30 May 2019 07:21  Phos  3.9     05-30    TPro  7.0  /  Alb  3.0<L>  /  TBili  0.7  /  DBili  x   /  AST  34<H>  /  ALT  43<H>  /  AlkPhos  153<H>  05-30     PTT - ( 29 May 2019 13:52 )  PTT:31.4 sec    Radiology :
Internal Medicine Hospitalist Progress Note      follow up for rectal bleed , s/p colonoscopy no active bleed   pt is with no further rectal bleed BM since yesterday  overnight labs hb is low transfused 2 unit bloods   pt is tolerating the diet   d/w GI cont current therapy monitoring     Vital Signs Last 24 Hrs  T(C): 36.5 (01 Jun 2019 08:19), Max: 36.8 (01 Jun 2019 05:57)  T(F): 97.7 (01 Jun 2019 08:19), Max: 98.2 (01 Jun 2019 05:57)  HR: 74 (01 Jun 2019 08:19) (74 - 81)  BP: 125/72 (01 Jun 2019 08:19) (109/65 - 146/64)  BP(mean): --  RR: 18 (01 Jun 2019 08:19) (17 - 18)  SpO2: 97% (01 Jun 2019 08:19) (97% - 99%)    PHYSICAL EXAM:      Constitutional: awake alert , no distress     Neck: supple , no JVD       Respiratory: CTA bilateral anteriorly     Cardiovascular: regular rate S1 /S2     Gastrointestinal: soft no tenderness , BS positive     Extremities: no pretibial edema     CAPILLARY BLOOD GLUCOSE      POCT Blood Glucose.: 127 mg/dL (01 Jun 2019 08:02)  POCT Blood Glucose.: 309 mg/dL (31 May 2019 22:11)  POCT Blood Glucose.: 180 mg/dL (31 May 2019 16:24)  POCT Blood Glucose.: 133 mg/dL (31 May 2019 11:00)          LABS:                             7.0    6.2   )-----------( 291      ( 31 May 2019 22:52 )             21.3   05-31    143  |  97<L>  |  42.0<H>  ----------------------------<  111  3.6   |  33.0<H>  |  0.98    Ca    8.6      31 May 2019 09:53
Internal Medicine Hospitalist Progress Note      follow up for rectal bleed , s/p colonoscopy no active bleed   pt is with no further rectal bleed BM since yesterday  pt is tolerating the diet   d/w GI cont current therapy monitoring     Vital Signs Last 24 Hrs  T(C): 36.5 (01 Jun 2019 08:19), Max: 36.8 (01 Jun 2019 05:57)  T(F): 97.7 (01 Jun 2019 08:19), Max: 98.2 (01 Jun 2019 05:57)  HR: 74 (01 Jun 2019 08:19) (74 - 81)  BP: 125/72 (01 Jun 2019 08:19) (109/65 - 146/64)  BP(mean): --  RR: 18 (01 Jun 2019 08:19) (17 - 18)  SpO2: 97% (01 Jun 2019 08:19) (97% - 99%)    PHYSICAL EXAM:      Constitutional: awake alert , no distress     Neck: supple , no JVD       Respiratory: CTA bilateral anteriorly     Cardiovascular: regular rate S1 /S2     Gastrointestinal: soft no tenderness , BS positive     Extremities: no pretibial edema     CAPILLARY BLOOD GLUCOSE      POCT Blood Glucose.: 127 mg/dL (01 Jun 2019 08:02)  POCT Blood Glucose.: 309 mg/dL (31 May 2019 22:11)  POCT Blood Glucose.: 180 mg/dL (31 May 2019 16:24)  POCT Blood Glucose.: 133 mg/dL (31 May 2019 11:00)          LABS:                             7.0    6.2   )-----------( 291      ( 31 May 2019 22:52 )             21.3   05-31    143  |  97<L>  |  42.0<H>  ----------------------------<  111  3.6   |  33.0<H>  |  0.98    Ca    8.6      31 May 2019 09:53
[facility] DIVISION OF KIDNEY DISEASES AND HYPERTENSION -- FOLLOW UP NOTE  --------------------------------------------------------------------------------  Chief Complaint:  KEITH    24 hour events/subjective:  Pt seen and examined  NAD  KEITH resolved      PAST HISTORY  --------------------------------------------------------------------------------  No significant changes to PMH, PSH, FHx, SHx, unless otherwise noted    ALLERGIES & MEDICATIONS  --------------------------------------------------------------------------------  Allergies    Allergy Status Unknown  No Known Allergies    Intolerances      Standing Inpatient Medications  amLODIPine   Tablet 5 milliGRAM(s) Oral daily  ascorbic acid 500 milliGRAM(s) Oral daily  carvedilol 6.25 milliGRAM(s) Oral every 12 hours  dextrose 5% + sodium chloride 0.9%. 1000 milliLiter(s) IV Continuous <Continuous>  dextrose 5%. 1000 milliLiter(s) IV Continuous <Continuous>  dextrose 50% Injectable 12.5 Gram(s) IV Push once  dextrose 50% Injectable 25 Gram(s) IV Push once  dextrose 50% Injectable 25 Gram(s) IV Push once  DULoxetine 60 milliGRAM(s) Oral daily  folic acid 1 milliGRAM(s) Oral daily  gabapentin 100 milliGRAM(s) Oral three times a day  insulin glargine Injectable (LANTUS) 15 Unit(s) SubCutaneous at bedtime  insulin lispro (HumaLOG) corrective regimen sliding scale   SubCutaneous three times a day before meals  iron sucrose IVPB 100 milliGRAM(s) IV Intermittent every 24 hours  isosorbide   mononitrate ER Tablet (IMDUR) 60 milliGRAM(s) Oral daily  latanoprost 0.005% Ophthalmic Solution 1 Drop(s) Left EYE at bedtime  losartan 25 milliGRAM(s) Oral daily  multivitamin 1 Tablet(s) Oral daily  pantoprazole  Injectable 40 milliGRAM(s) IV Push two times a day  pilocarpine 4% Solution 1 Drop(s) Both EYES four times a day    PRN Inpatient Medications  dextrose 40% Gel 15 Gram(s) Oral once PRN  glucagon  Injectable 1 milliGRAM(s) IntraMuscular once PRN      REVIEW OF SYSTEMS  --------------------------------------------------------------------------------  CONSTITUTIONAL: No fever, weight loss, + fatigue  EYES: Legally Blind, Corneal opacity -  R ;   ENMT:  No difficulty hearing, tinnitus, vertigo; No sinus or throat pain  NECK: No pain or stiffness  BREASTS: No pain, masses, or nipple discharge  RESPIRATORY: No cough, wheezing, chills or hemoptysis; + shortness of breath  CARDIOVASCULAR: No chest pain, palpitations, dizziness, + leg swelling  GASTROINTESTINAL: No abdominal or epigastric pain. No nausea, vomiting, or hematemesis; No diarrhea or constipation. No melena , +  hematochezia.  GENITOURINARY: No dysuria, frequency, hematuria, + Occ. incontinence  NEUROLOGICAL: No headaches, memory loss, loss of strength, + numbness, or tremors  SKIN: No itching, burning, rashes, or lesions   LYMPH NODES: No enlarged glands  ENDOCRINE: No heat or cold intolerance; No hair loss  MUSCULOSKELETAL: No joint pain or swelling; No muscle, back, or extremity pain  PSYCHIATRIC: No depression, anxiety, mood swings, or difficulty sleeping  HEME/LYMPH: No easy bruising, or bleeding gums  ALLERGY AND IMMUNOLOGIC: No hives or eczema      VITALS/PHYSICAL EXAM  --------------------------------------------------------------------------------  T(C): 36.5 (05-31-19 @ 07:56), Max: 36.7 (05-31-19 @ 01:06)  HR: 72 (05-31-19 @ 07:56) (72 - 78)  BP: 119/53 (05-31-19 @ 07:56) (119/53 - 153/78)  RR: 18 (05-31-19 @ 07:56) (18 - 18)  SpO2: 99% (05-31-19 @ 07:56) (98% - 100%)  Wt(kg): --  Height (cm): 162.56 (05-30-19 @ 00:31)  Weight (kg): 85.1 (05-30-19 @ 00:31)  BMI (kg/m2): 32.2 (05-30-19 @ 00:31)  BSA (m2): 1.9 (05-30-19 @ 00:31)      05-30-19 @ 07:01  -  05-31-19 @ 07:00  --------------------------------------------------------  IN: 100 mL / OUT: 0 mL / NET: 100 mL      Physical Exam:  	Gen: NAD, pale  	HEENT: PERRL, supple neck, clear oropharynx  	Pulm: + rales, no rhonchi or wheeze  	CV: RRR, S1S2; no rubs, gallops or murmurs, midline chest scar  	Back: No spinal or CVA tenderness; no sacral edema  	Abd: +BS, soft, nontender/nondistended  	: No suprapubic tenderness  	UE: Warm, FROM, no clubbing, intact strength;  no edema; no asterixis  	LE: Warm, FROM, no clubbing, intact strength; + edema, Toe amputations  	Neuro: No focal deficits  	Psych: Normal affect and mood  	Skin: Warm, without rashes  	Vascular access:  N/A    LABS/STUDIES  --------------------------------------------------------------------------------              8.3    7.0   >-----------<  319      [05-31-19 @ 09:53]              25.0     143  |  97  |  42.0  ----------------------------<  111      [05-31-19 @ 09:53]  3.6   |  33.0  |  0.98        Ca     8.6     [05-31-19 @ 09:53]      Phos  3.9     [05-30-19 @ 07:21]    TPro  7.0  /  Alb  3.0  /  TBili  0.7  /  DBili  x   /  AST  34  /  ALT  43  /  AlkPhos  153  [05-30-19 @ 07:21]    PT/INR: PT 13.6 , INR 1.18       [05-30-19 @ 09:23]  PTT: 31.4       [05-29-19 @ 13:52]      Creatinine Trend:  SCr 0.98 [05-31 @ 09:53]  SCr 1.18 [05-30 @ 07:21]  SCr 1.65 [05-29 @ 13:52]  SCr 0.86 [05-23 @ 05:40]  SCr 1.07 [05-22 @ 06:10]    Urinalysis - [05-17-19 @ 12:37]      Color Yellow / Appearance Clear / SG 1.020 / pH 5.0      Gluc Negative / Ketone Trace  / Bili Negative / Urobili 4       Blood Moderate / Protein 100 / Leuk Est Moderate / Nitrite Negative      RBC 6-10 / WBC 6-10 / Hyaline  / Gran  / Sq Epi  / Non Sq Epi Occasional / Bacteria Occasional      Iron 47, TIBC 326, %sat 14      [05-29-19 @ 13:52]  Ferritin 245      [05-29-19 @ 13:52]  PTH -- (Ca 9.1)      [05-30-19 @ 18:09]   107  PTH -- (Ca 7.9)      [01-17-19 @ 16:26]   175  PTH -- (Ca 8.1)      [01-16-19 @ 16:55]   148  Vitamin D (25OH) 31.1      [05-30-19 @ 18:17]  HbA1c 7.7      [05-18-19 @ 06:26]  TSH 1.10      [05-17-19 @ 12:21]  Lipid: chol 116, TG 65, HDL 38, LDL 65      [11-26-18 @ 11:32]    HCV 0.07, Nonreact      [05-18-19 @ 20:37]
Patient with rectal bleeding on DAPT.     Will plan to hold both till follow up office.     Cardiomems numbers reviewed. Patient with significantly lower PADP. No further IV diuretics. Restart po torsemide at discharge.     Follow up in office.
[facility] DIVISION OF KIDNEY DISEASES AND HYPERTENSION -- FOLLOW UP NOTE  --------------------------------------------------------------------------------  Chief Complaint:  KEITH/GI Bleed    24 hour events/subjective:  Pt seen and examined   NAD  Lasix gtt      PAST HISTORY  --------------------------------------------------------------------------------  No significant changes to PMH, PSH, FHx, SHx, unless otherwise noted    ALLERGIES & MEDICATIONS  --------------------------------------------------------------------------------  Allergies    Allergy Status Unknown  No Known Allergies    Intolerances      Standing Inpatient Medications  amLODIPine   Tablet 5 milliGRAM(s) Oral daily  ascorbic acid 500 milliGRAM(s) Oral daily  carvedilol 6.25 milliGRAM(s) Oral every 12 hours  dextrose 5%. 1000 milliLiter(s) IV Continuous <Continuous>  dextrose 50% Injectable 12.5 Gram(s) IV Push once  dextrose 50% Injectable 25 Gram(s) IV Push once  dextrose 50% Injectable 25 Gram(s) IV Push once  DULoxetine 60 milliGRAM(s) Oral daily  folic acid 1 milliGRAM(s) Oral daily  furosemide Infusion 10 mG/Hr IV Continuous <Continuous>  gabapentin 100 milliGRAM(s) Oral three times a day  insulin glargine Injectable (LANTUS) 15 Unit(s) SubCutaneous at bedtime  insulin lispro (HumaLOG) corrective regimen sliding scale   SubCutaneous three times a day before meals  iron sucrose IVPB 100 milliGRAM(s) IV Intermittent every 24 hours  isosorbide   mononitrate ER Tablet (IMDUR) 60 milliGRAM(s) Oral daily  latanoprost 0.005% Ophthalmic Solution 1 Drop(s) Left EYE at bedtime  losartan 25 milliGRAM(s) Oral daily  multivitamin 1 Tablet(s) Oral daily  pantoprazole  Injectable 40 milliGRAM(s) IV Push two times a day  pilocarpine 4% Solution 1 Drop(s) Both EYES four times a day  polyethylene glycol/electrolyte Solution. 4000 milliLiter(s) Oral once    PRN Inpatient Medications  dextrose 40% Gel 15 Gram(s) Oral once PRN  glucagon  Injectable 1 milliGRAM(s) IntraMuscular once PRN      REVIEW OF SYSTEMS  --------------------------------------------------------------------------------  CONSTITUTIONAL: No fever, weight loss, + fatigue  EYES: Legally Blind, Corneal opacity -  R ;   ENMT:  No difficulty hearing, tinnitus, vertigo; No sinus or throat pain  NECK: No pain or stiffness  BREASTS: No pain, masses, or nipple discharge  RESPIRATORY: No cough, wheezing, chills or hemoptysis; + shortness of breath  CARDIOVASCULAR: No chest pain, palpitations, dizziness, + leg swelling  GASTROINTESTINAL: No abdominal or epigastric pain. No nausea, vomiting, or hematemesis; No diarrhea or constipation. No melena , +  hematochezia.  GENITOURINARY: No dysuria, frequency, hematuria, + Occ. incontinence  NEUROLOGICAL: No headaches, memory loss, loss of strength, + numbness, or tremors  SKIN: No itching, burning, rashes, or lesions   LYMPH NODES: No enlarged glands  ENDOCRINE: No heat or cold intolerance; No hair loss  MUSCULOSKELETAL: No joint pain or swelling; No muscle, back, or extremity pain  PSYCHIATRIC: No depression, anxiety, mood swings, or difficulty sleeping  HEME/LYMPH: No easy bruising, or bleeding gums  ALLERGY AND IMMUNOLOGIC: No hives or eczema      VITALS/PHYSICAL EXAM  --------------------------------------------------------------------------------  T(C): 36.6 (05-30-19 @ 08:11), Max: 37.4 (05-29-19 @ 22:39)  HR: 70 (05-30-19 @ 08:41) (68 - 80)  BP: 121/58 (05-30-19 @ 08:11) (121/58 - 151/67)  RR: 18 (05-30-19 @ 08:11) (18 - 22)  SpO2: 99% (05-30-19 @ 08:41) (76% - 100%)  Wt(kg): --  Height (cm): 162.56 (05-30-19 @ 00:31)  Weight (kg): 85.1 (05-30-19 @ 00:31)  BMI (kg/m2): 32.2 (05-30-19 @ 00:31)  BSA (m2): 1.9 (05-30-19 @ 00:31)      05-29-19 @ 07:01  -  05-30-19 @ 07:00  --------------------------------------------------------  IN: 20 mL / OUT: 0 mL / NET: 20 mL      Physical Exam:  	Gen: NAD, pale  	HEENT: PERRL, supple neck, clear oropharynx  	Pulm: + rales, no rhonchi or wheeze  	CV: RRR, S1S2; no rubs, gallops or murmurs, midline chest scar  	Back: No spinal or CVA tenderness; no sacral edema  	Abd: +BS, soft, nontender/nondistended  	: No suprapubic tenderness  	UE: Warm, FROM, no clubbing, intact strength; 1+ edema; no asterixis  	LE: Warm, FROM, no clubbing, intact strength; 1+ edema, Toe amputations  	Neuro: No focal deficits  	Psych: Normal affect and mood  	Skin: Warm, without rashes  	Vascular access:  N/A    LABS/STUDIES  --------------------------------------------------------------------------------              8.7    7.8   >-----------<  300      [05-30-19 @ 09:23]              26.3     140  |  97  |  47.0  ----------------------------<  87      [05-30-19 @ 07:21]  4.6   |  30.0  |  1.18        Ca     9.0     [05-30-19 @ 07:21]      Phos  3.9     [05-30-19 @ 07:21]    TPro  7.0  /  Alb  3.0  /  TBili  0.7  /  DBili  x   /  AST  34  /  ALT  43  /  AlkPhos  153  [05-30-19 @ 07:21]    PT/INR: PT 13.6 , INR 1.18       [05-30-19 @ 09:23]  PTT: 31.4       [05-29-19 @ 13:52]      Creatinine Trend:  SCr 1.18 [05-30 @ 07:21]  SCr 1.65 [05-29 @ 13:52]  SCr 0.86 [05-23 @ 05:40]  SCr 1.07 [05-22 @ 06:10]  SCr 1.24 [05-21 @ 06:18]    Urinalysis - [05-17-19 @ 12:37]      Color Yellow / Appearance Clear / SG 1.020 / pH 5.0      Gluc Negative / Ketone Trace  / Bili Negative / Urobili 4       Blood Moderate / Protein 100 / Leuk Est Moderate / Nitrite Negative      RBC 6-10 / WBC 6-10 / Hyaline  / Gran  / Sq Epi  / Non Sq Epi Occasional / Bacteria Occasional      Iron 47, TIBC 326, %sat 14      [05-29-19 @ 13:52]  Ferritin 245      [05-29-19 @ 13:52]  PTH -- (Ca 7.9)      [01-17-19 @ 16:26]   175  PTH -- (Ca 8.1)      [01-16-19 @ 16:55]   148  Vitamin D (25OH) 25.1      [01-17-19 @ 16:25]  HbA1c 7.7      [05-18-19 @ 06:26]  TSH 1.10      [05-17-19 @ 12:21]  Lipid: chol 116, TG 65, HDL 38, LDL 65      [11-26-18 @ 11:32]    HCV 0.07, Nonreact      [05-18-19 @ 20:37]
Internal Medicine Hospitalist Progress Note      follow up for rectal bleed , pt continues to have bloody BM   no abdominal pain , denies nausea   GI consult input appreciated               ROS: as above, all remaining ROS are negative.       BACKGROUND:  MEDICATIONS  (STANDING):  ALBUTerol/ipratropium for Nebulization 3 milliLiter(s) Nebulizer every 6 hours  amLODIPine   Tablet 5 milliGRAM(s) Oral daily  ascorbic acid 500 milliGRAM(s) Oral daily  carvedilol 6.25 milliGRAM(s) Oral every 12 hours  dextrose 5%. 1000 milliLiter(s) (50 mL/Hr) IV Continuous <Continuous>  dextrose 50% Injectable 12.5 Gram(s) IV Push once  dextrose 50% Injectable 25 Gram(s) IV Push once  dextrose 50% Injectable 25 Gram(s) IV Push once  DULoxetine 60 milliGRAM(s) Oral daily  furosemide Infusion 10 mG/Hr (5 mL/Hr) IV Continuous <Continuous>  gabapentin 100 milliGRAM(s) Oral three times a day  insulin glargine Injectable (LANTUS) 15 Unit(s) SubCutaneous at bedtime  insulin lispro (HumaLOG) corrective regimen sliding scale   SubCutaneous three times a day before meals  isosorbide   mononitrate ER Tablet (IMDUR) 60 milliGRAM(s) Oral daily  latanoprost 0.005% Ophthalmic Solution 1 Drop(s) Left EYE at bedtime  losartan 25 milliGRAM(s) Oral daily  multivitamin 1 Tablet(s) Oral daily  pantoprazole  Injectable 40 milliGRAM(s) IV Push two times a day  pilocarpine 4% Solution 1 Drop(s) Both EYES four times a day    MEDICATIONS  (PRN):  dextrose 40% Gel 15 Gram(s) Oral once PRN Blood Glucose LESS THAN 70 milliGRAM(s)/deciliter  glucagon  Injectable 1 milliGRAM(s) IntraMuscular once PRN Glucose LESS THAN 70 milligrams/deciliter    Allergies    Allergy Status Unknown  No Known Allergies    Intolerances            VITALS:  Vital Signs Last 24 Hrs  T(C): 36.6 (30 May 2019 08:11), Max: 37.4 (29 May 2019 22:39)  T(F): 97.9 (30 May 2019 08:11), Max: 99.4 (29 May 2019 22:39)  HR: 70 (30 May 2019 08:41) (68 - 80)  BP: 121/58 (30 May 2019 08:11) (121/58 - 151/67)  BP(mean): --  RR: 18 (30 May 2019 08:11) (18 - 22)  SpO2: 99% (30 May 2019 08:41) (76% - 100%) Daily Height in cm: 162.56 (30 May 2019 00:31)    Daily   CAPILLARY BLOOD GLUCOSE      POCT Blood Glucose.: 103 mg/dL (30 May 2019 08:11)  POCT Blood Glucose.: 84 mg/dL (29 May 2019 21:57)    I&O's Summary    29 May 2019 07:01  -  30 May 2019 07:00  --------------------------------------------------------  IN: 20 mL / OUT: 0 mL / NET: 20 mL        PHYSICAL EXAM:      Constitutional: awake alert , no distress     Neck: supple , no JVD       Respiratory: CTA bilateral anteriorly     Cardiovascular: regular rate S1 /S2     Gastrointestinal: soft no tenderness , BS postive       Extremities: no pretibial edema             LABS:                        8.1    8.1   )-----------( 284      ( 30 May 2019 07:25 )             23.6     05-30    140  |  97<L>  |  47.0<H>  ----------------------------<  87  4.6   |  30.0<H>  |  1.18    Ca    9.0      30 May 2019 07:21  Phos  3.9     05-30    TPro  7.0  /  Alb  3.0<L>  /  TBili  0.7  /  DBili  x   /  AST  34<H>  /  ALT  43<H>  /  AlkPhos  153<H>  05-30    PT/INR - ( 29 May 2019 13:52 )   PT: 14.3 sec;   INR: 1.24 ratio     Iron with Total Binding Capacity (05.29.19 @ 13:52)    Iron - Total Binding Capacity.: 326 ug/dL    % Saturation, Iron: 14 %    Iron Total, Serum: 47 ug/dL            PTT - ( 29 May 2019 13:52 )  PTT:31.4 sec    Radiology :

## 2019-06-03 NOTE — DISCHARGE NOTE PROVIDER - NSDCCPCAREPLAN_GEN_ALL_CORE_FT
PRINCIPAL DISCHARGE DIAGNOSIS  Diagnosis: GI bleed  Assessment and Plan of Treatment: Secondary to diverticular bleed. A colonoscopy was performed, no active bleeding was found. You should stop taking your aspirin and plavix unitl follow up appointment with your cardiologist on discharge. Follow up with GI as an outpatient.      SECONDARY DISCHARGE DIAGNOSES  Diagnosis: UTI (urinary tract infection)  Assessment and Plan of Treatment: You were started on abx during your hospital stay. Your last dose of antibiotics is tonight 6/3/19 at 6pm.  Please follow up with your PMD if you should have any further symptoms.    Diagnosis: Diabetes mellitus  Assessment and Plan of Treatment: To resume home meds, follow up wtih PMD    Diagnosis: CAD (coronary artery disease)  Assessment and Plan of Treatment: You were seen by the cardiologist during you hospital stay. You should stop taking your aspirin and plavix unitl follow up appointment with your cardiologist on discharge.    Diagnosis: Chronic diastolic heart failure  Assessment and Plan of Treatment: Resume home meds. Follow up with cardiology on discharge.    Diagnosis: Anemia  Assessment and Plan of Treatment: You recived a blood transfussion during your hospital stay. Your hemoglobin / blood levels are now normal. Please continue taking iron supplement as prescribed. Follow up with you PMD as needed.    Diagnosis: Acute renal failure  Assessment and Plan of Treatment: Resolved    Diagnosis: Hyperkalemia  Assessment and Plan of Treatment: Resolved PRINCIPAL DISCHARGE DIAGNOSIS  Diagnosis: GI bleed  Assessment and Plan of Treatment: Secondary to diverticular bleed. A colonoscopy was performed, no active bleeding was found. You should stop taking your aspirin and plavix unitl follow up appointment with your cardiologist within 1 week of discharge. Follow up with GI as an outpatient.      SECONDARY DISCHARGE DIAGNOSES  Diagnosis: UTI (urinary tract infection)  Assessment and Plan of Treatment: You were started on abx during your hospital stay.  Please follow up with your PMD if you should have any further symptoms.    Diagnosis: Diabetes mellitus  Assessment and Plan of Treatment: To resume home meds, follow up wtih PMD    Diagnosis: CAD (coronary artery disease)  Assessment and Plan of Treatment: You were seen by the cardiologist during you hospital stay. You should stop taking your aspirin and plavix unitl follow up appointment with your cardiologist on discharge.    Diagnosis: Chronic diastolic heart failure  Assessment and Plan of Treatment: Resume home meds. Follow up with cardiology on discharge.    Diagnosis: Anemia  Assessment and Plan of Treatment: You recived a blood transfussion during your hospital stay. Your hemoglobin / blood levels are now normal. Please continue taking iron supplement as prescribed. Follow up with you PMD as needed.    Diagnosis: Acute renal failure  Assessment and Plan of Treatment: Resolved    Diagnosis: Hyperkalemia  Assessment and Plan of Treatment: Resolved

## 2019-06-03 NOTE — PHYSICAL THERAPY INITIAL EVALUATION ADULT - CRITERIA FOR SKILLED THERAPEUTIC INTERVENTIONS
impairments found/risk reduction/prevention/rehab potential/functional limitations in following categories/therapy frequency/anticipated equipment needs at discharge/anticipated discharge recommendation

## 2019-06-03 NOTE — PROGRESS NOTE ADULT - PROVIDER SPECIALTY LIST ADULT
Cardiology
Cardiology
Gastroenterology
Hospitalist
Nephrology
Nephrology
Hospitalist

## 2019-06-05 LAB
-  AMIKACIN: SIGNIFICANT CHANGE UP
-  AMPICILLIN/SULBACTAM: SIGNIFICANT CHANGE UP
-  AMPICILLIN: SIGNIFICANT CHANGE UP
-  AZTREONAM: SIGNIFICANT CHANGE UP
-  CEFAZOLIN: SIGNIFICANT CHANGE UP
-  CEFEPIME: SIGNIFICANT CHANGE UP
-  CEFOXITIN: SIGNIFICANT CHANGE UP
-  CEFTRIAXONE: SIGNIFICANT CHANGE UP
-  CIPROFLOXACIN: SIGNIFICANT CHANGE UP
-  ERTAPENEM: SIGNIFICANT CHANGE UP
-  GENTAMICIN: SIGNIFICANT CHANGE UP
-  IMIPENEM: SIGNIFICANT CHANGE UP
-  LEVOFLOXACIN: SIGNIFICANT CHANGE UP
-  MEROPENEM: SIGNIFICANT CHANGE UP
-  NITROFURANTOIN: SIGNIFICANT CHANGE UP
-  PIPERACILLIN/TAZOBACTAM: SIGNIFICANT CHANGE UP
-  TIGECYCLINE: SIGNIFICANT CHANGE UP
-  TOBRAMYCIN: SIGNIFICANT CHANGE UP
-  TRIMETHOPRIM/SULFAMETHOXAZOLE: SIGNIFICANT CHANGE UP
METHOD TYPE: SIGNIFICANT CHANGE UP

## 2019-06-06 LAB
CULTURE RESULTS: SIGNIFICANT CHANGE UP
ORGANISM # SPEC MICROSCOPIC CNT: SIGNIFICANT CHANGE UP
ORGANISM # SPEC MICROSCOPIC CNT: SIGNIFICANT CHANGE UP
SPECIMEN SOURCE: SIGNIFICANT CHANGE UP

## 2019-06-20 ENCOUNTER — APPOINTMENT (OUTPATIENT)
Dept: CARDIOLOGY | Facility: CLINIC | Age: 64
End: 2019-06-20

## 2019-06-28 ENCOUNTER — APPOINTMENT (OUTPATIENT)
Dept: CARDIOLOGY | Facility: CLINIC | Age: 64
End: 2019-06-28
Payer: MEDICARE

## 2019-06-28 PROCEDURE — 93264 REM MNTR WRLS P-ART PRS SNR: CPT

## 2019-07-10 ENCOUNTER — APPOINTMENT (OUTPATIENT)
Dept: CARDIOLOGY | Facility: CLINIC | Age: 64
End: 2019-07-10
Payer: MEDICARE

## 2019-07-10 VITALS
BODY MASS INDEX: 32.32 KG/M2 | OXYGEN SATURATION: 92 % | SYSTOLIC BLOOD PRESSURE: 121 MMHG | WEIGHT: 194 LBS | DIASTOLIC BLOOD PRESSURE: 70 MMHG | HEIGHT: 65 IN | HEART RATE: 83 BPM

## 2019-07-10 DIAGNOSIS — I50.30 UNSPECIFIED DIASTOLIC (CONGESTIVE) HEART FAILURE: ICD-10-CM

## 2019-07-10 DIAGNOSIS — E78.00 PURE HYPERCHOLESTEROLEMIA, UNSPECIFIED: ICD-10-CM

## 2019-07-10 DIAGNOSIS — R06.02 SHORTNESS OF BREATH: ICD-10-CM

## 2019-07-10 DIAGNOSIS — R60.0 LOCALIZED EDEMA: ICD-10-CM

## 2019-07-10 DIAGNOSIS — I25.10 ATHEROSCLEROTIC HEART DISEASE OF NATIVE CORONARY ARTERY W/OUT ANGINA PECTORIS: ICD-10-CM

## 2019-07-10 DIAGNOSIS — I50.33 ACUTE ON CHRONIC DIASTOLIC (CONGESTIVE) HEART FAILURE: ICD-10-CM

## 2019-07-10 DIAGNOSIS — I73.9 PERIPHERAL VASCULAR DISEASE, UNSPECIFIED: ICD-10-CM

## 2019-07-10 PROCEDURE — 99214 OFFICE O/P EST MOD 30 MIN: CPT

## 2019-07-10 PROCEDURE — 93000 ELECTROCARDIOGRAM COMPLETE: CPT

## 2019-07-10 RX ORDER — CARVEDILOL 6.25 MG/1
6.25 TABLET, FILM COATED ORAL
Qty: 60 | Refills: 0 | Status: ACTIVE | COMMUNITY
Start: 2019-07-10

## 2019-07-10 RX ORDER — ISOSORBIDE MONONITRATE 60 MG/1
60 TABLET, EXTENDED RELEASE ORAL DAILY
Qty: 30 | Refills: 3 | Status: ACTIVE | COMMUNITY
Start: 2018-11-20

## 2019-07-10 RX ORDER — ASPIRIN ENTERIC COATED TABLETS 81 MG 81 MG/1
81 TABLET, DELAYED RELEASE ORAL
Refills: 0 | Status: ACTIVE | COMMUNITY
Start: 2019-07-10

## 2019-07-13 ENCOUNTER — RESULT REVIEW (OUTPATIENT)
Age: 64
End: 2019-07-13

## 2019-07-13 DIAGNOSIS — E87.5 HYPERKALEMIA: ICD-10-CM

## 2019-07-13 LAB
ANION GAP SERPL CALC-SCNC: 8 MMOL/L
BUN SERPL-MCNC: 24 MG/DL
CALCIUM SERPL-MCNC: 9.1 MG/DL
CHLORIDE SERPL-SCNC: 102 MMOL/L
CO2 SERPL-SCNC: 30 MMOL/L
CREAT SERPL-MCNC: 1.59 MG/DL
GLUCOSE SERPL-MCNC: 106 MG/DL
POTASSIUM SERPL-SCNC: 6 MMOL/L
SODIUM SERPL-SCNC: 140 MMOL/L

## 2019-07-16 ENCOUNTER — NON-APPOINTMENT (OUTPATIENT)
Age: 64
End: 2019-07-16

## 2019-07-18 ENCOUNTER — RESULT REVIEW (OUTPATIENT)
Age: 64
End: 2019-07-18

## 2019-07-18 LAB
ANION GAP SERPL CALC-SCNC: 7 MMOL/L
BUN SERPL-MCNC: 18 MG/DL
CALCIUM SERPL-MCNC: 9.2 MG/DL
CHLORIDE SERPL-SCNC: 102 MMOL/L
CO2 SERPL-SCNC: 33 MMOL/L
CREAT SERPL-MCNC: 1.25 MG/DL
GLUCOSE SERPL-MCNC: 157 MG/DL
POTASSIUM SERPL-SCNC: 5 MMOL/L
SODIUM SERPL-SCNC: 142 MMOL/L

## 2019-08-06 ENCOUNTER — INPATIENT (INPATIENT)
Facility: HOSPITAL | Age: 64
LOS: 0 days | Discharge: HOME | DRG: 812 | End: 2019-08-07
Attending: INTERNAL MEDICINE | Admitting: INTERNAL MEDICINE
Payer: MEDICARE

## 2019-08-06 VITALS
OXYGEN SATURATION: 95 % | SYSTOLIC BLOOD PRESSURE: 118 MMHG | HEIGHT: 65 IN | RESPIRATION RATE: 20 BRPM | WEIGHT: 197.09 LBS | HEART RATE: 60 BPM | DIASTOLIC BLOOD PRESSURE: 65 MMHG | TEMPERATURE: 97 F

## 2019-08-06 DIAGNOSIS — K92.2 GASTROINTESTINAL HEMORRHAGE, UNSPECIFIED: ICD-10-CM

## 2019-08-06 LAB
ALBUMIN SERPL ELPH-MCNC: 3.4 G/DL — SIGNIFICANT CHANGE UP (ref 3.3–5.2)
ALP SERPL-CCNC: 132 U/L — HIGH (ref 40–120)
ALT FLD-CCNC: 19 U/L — SIGNIFICANT CHANGE UP
ANION GAP SERPL CALC-SCNC: 9 MMOL/L — SIGNIFICANT CHANGE UP (ref 5–17)
APTT BLD: 28.3 SEC — SIGNIFICANT CHANGE UP (ref 27.5–36.3)
AST SERPL-CCNC: 32 U/L — HIGH
BILIRUB SERPL-MCNC: 0.6 MG/DL — SIGNIFICANT CHANGE UP (ref 0.4–2)
BLD GP AB SCN SERPL QL: SIGNIFICANT CHANGE UP
BUN SERPL-MCNC: 51 MG/DL — HIGH (ref 8–20)
CALCIUM SERPL-MCNC: 8.6 MG/DL — SIGNIFICANT CHANGE UP (ref 8.6–10.2)
CHLORIDE SERPL-SCNC: 102 MMOL/L — SIGNIFICANT CHANGE UP (ref 98–107)
CO2 SERPL-SCNC: 29 MMOL/L — SIGNIFICANT CHANGE UP (ref 22–29)
CREAT SERPL-MCNC: 1.65 MG/DL — HIGH (ref 0.5–1.3)
FERRITIN SERPL-MCNC: 102 NG/ML — SIGNIFICANT CHANGE UP (ref 15–150)
GLUCOSE BLDC GLUCOMTR-MCNC: 105 MG/DL — HIGH (ref 70–99)
GLUCOSE BLDC GLUCOMTR-MCNC: 53 MG/DL — LOW (ref 70–99)
GLUCOSE BLDC GLUCOMTR-MCNC: 53 MG/DL — LOW (ref 70–99)
GLUCOSE BLDC GLUCOMTR-MCNC: 54 MG/DL — LOW (ref 70–99)
GLUCOSE BLDC GLUCOMTR-MCNC: 85 MG/DL — SIGNIFICANT CHANGE UP (ref 70–99)
GLUCOSE SERPL-MCNC: 75 MG/DL — SIGNIFICANT CHANGE UP (ref 70–115)
HCT VFR BLD CALC: 22.8 % — LOW (ref 34.5–45)
HGB BLD-MCNC: 7.3 G/DL — LOW (ref 11.5–15.5)
INR BLD: 1.34 RATIO — HIGH (ref 0.88–1.16)
IRON SATN MFR SERPL: 335 UG/DL — HIGH (ref 37–145)
IRON SATN MFR SERPL: 335 UG/DL — HIGH (ref 37–145)
IRON SATN MFR SERPL: 80 % — HIGH (ref 14–50)
MCHC RBC-ENTMCNC: 27.9 PG — SIGNIFICANT CHANGE UP (ref 27–34)
MCHC RBC-ENTMCNC: 32 GM/DL — SIGNIFICANT CHANGE UP (ref 32–36)
MCV RBC AUTO: 87 FL — SIGNIFICANT CHANGE UP (ref 80–100)
OB PNL STL: POSITIVE
PLATELET # BLD AUTO: 264 K/UL — SIGNIFICANT CHANGE UP (ref 150–400)
POTASSIUM SERPL-MCNC: 5.8 MMOL/L — HIGH (ref 3.5–5.3)
POTASSIUM SERPL-SCNC: 5.8 MMOL/L — HIGH (ref 3.5–5.3)
PROT SERPL-MCNC: 7.1 G/DL — SIGNIFICANT CHANGE UP (ref 6.6–8.7)
PROTHROM AB SERPL-ACNC: 15.6 SEC — HIGH (ref 10–12.9)
RBC # BLD: 2.62 M/UL — LOW (ref 3.8–5.2)
RBC # FLD: 17.2 % — HIGH (ref 10.3–14.5)
RETICS #: 114 K/UL — SIGNIFICANT CHANGE UP (ref 25–125)
RETICS/RBC NFR: 4.5 % — HIGH (ref 0.5–2.5)
SODIUM SERPL-SCNC: 140 MMOL/L — SIGNIFICANT CHANGE UP (ref 135–145)
TIBC SERPL-MCNC: 419 UG/DL — SIGNIFICANT CHANGE UP (ref 220–430)
TRANSFERRIN SERPL-MCNC: 293 MG/DL — SIGNIFICANT CHANGE UP (ref 192–382)
TROPONIN T SERPL-MCNC: 0.14 NG/ML — HIGH (ref 0–0.06)
WBC # BLD: 5.38 K/UL — SIGNIFICANT CHANGE UP (ref 3.8–10.5)
WBC # FLD AUTO: 5.38 K/UL — SIGNIFICANT CHANGE UP (ref 3.8–10.5)

## 2019-08-06 PROCEDURE — 99285 EMERGENCY DEPT VISIT HI MDM: CPT

## 2019-08-06 PROCEDURE — 99223 1ST HOSP IP/OBS HIGH 75: CPT | Mod: GC

## 2019-08-06 PROCEDURE — 71250 CT THORAX DX C-: CPT | Mod: 26

## 2019-08-06 PROCEDURE — 93010 ELECTROCARDIOGRAM REPORT: CPT

## 2019-08-06 PROCEDURE — 71046 X-RAY EXAM CHEST 2 VIEWS: CPT | Mod: 26

## 2019-08-06 RX ORDER — DEXTROSE 50 % IN WATER 50 %
25 SYRINGE (ML) INTRAVENOUS ONCE
Refills: 0 | Status: DISCONTINUED | OUTPATIENT
Start: 2019-08-06 | End: 2019-08-06

## 2019-08-06 RX ORDER — DEXTROSE 50 % IN WATER 50 %
12.5 SYRINGE (ML) INTRAVENOUS ONCE
Refills: 0 | Status: DISCONTINUED | OUTPATIENT
Start: 2019-08-06 | End: 2019-08-07

## 2019-08-06 RX ORDER — INSULIN LISPRO 100/ML
VIAL (ML) SUBCUTANEOUS
Refills: 0 | Status: DISCONTINUED | OUTPATIENT
Start: 2019-08-06 | End: 2019-08-06

## 2019-08-06 RX ORDER — PILOCARPINE HCL 4 %
2 DROPS OPHTHALMIC (EYE)
Qty: 0 | Refills: 0 | DISCHARGE

## 2019-08-06 RX ORDER — GLUCAGON INJECTION, SOLUTION 0.5 MG/.1ML
1 INJECTION, SOLUTION SUBCUTANEOUS ONCE
Refills: 0 | Status: DISCONTINUED | OUTPATIENT
Start: 2019-08-06 | End: 2019-08-06

## 2019-08-06 RX ORDER — SODIUM CHLORIDE 9 MG/ML
1000 INJECTION, SOLUTION INTRAVENOUS
Refills: 0 | Status: DISCONTINUED | OUTPATIENT
Start: 2019-08-06 | End: 2019-08-06

## 2019-08-06 RX ORDER — ISOSORBIDE MONONITRATE 60 MG/1
60 TABLET, EXTENDED RELEASE ORAL DAILY
Refills: 0 | Status: DISCONTINUED | OUTPATIENT
Start: 2019-08-06 | End: 2019-08-06

## 2019-08-06 RX ORDER — DEXTROSE 50 % IN WATER 50 %
12.5 SYRINGE (ML) INTRAVENOUS ONCE
Refills: 0 | Status: DISCONTINUED | OUTPATIENT
Start: 2019-08-06 | End: 2019-08-06

## 2019-08-06 RX ORDER — LANOLIN ALCOHOL/MO/W.PET/CERES
1 CREAM (GRAM) TOPICAL
Qty: 0 | Refills: 0 | DISCHARGE

## 2019-08-06 RX ORDER — GLUCAGON INJECTION, SOLUTION 0.5 MG/.1ML
1 INJECTION, SOLUTION SUBCUTANEOUS ONCE
Refills: 0 | Status: DISCONTINUED | OUTPATIENT
Start: 2019-08-06 | End: 2019-08-07

## 2019-08-06 RX ORDER — DEXTROSE 50 % IN WATER 50 %
25 SYRINGE (ML) INTRAVENOUS ONCE
Refills: 0 | Status: DISCONTINUED | OUTPATIENT
Start: 2019-08-06 | End: 2019-08-07

## 2019-08-06 RX ORDER — INSULIN LISPRO 100/ML
8 VIAL (ML) SUBCUTANEOUS
Qty: 0 | Refills: 0 | DISCHARGE

## 2019-08-06 RX ORDER — BIMATOPROST 0.3 MG/ML
1 SOLUTION/ DROPS OPHTHALMIC
Qty: 0 | Refills: 0 | DISCHARGE

## 2019-08-06 RX ORDER — FERROUS SULFATE 325(65) MG
1 TABLET ORAL
Qty: 0 | Refills: 0 | DISCHARGE

## 2019-08-06 RX ORDER — ACETAMINOPHEN 500 MG
650 TABLET ORAL EVERY 6 HOURS
Refills: 0 | Status: DISCONTINUED | OUTPATIENT
Start: 2019-08-06 | End: 2019-08-07

## 2019-08-06 RX ORDER — FUROSEMIDE 40 MG
20 TABLET ORAL DAILY
Refills: 0 | Status: DISCONTINUED | OUTPATIENT
Start: 2019-08-06 | End: 2019-08-06

## 2019-08-06 RX ORDER — ALBUTEROL 90 UG/1
3 AEROSOL, METERED ORAL
Qty: 0 | Refills: 0 | DISCHARGE

## 2019-08-06 RX ORDER — PANTOPRAZOLE SODIUM 20 MG/1
40 TABLET, DELAYED RELEASE ORAL
Refills: 0 | Status: DISCONTINUED | OUTPATIENT
Start: 2019-08-06 | End: 2019-08-07

## 2019-08-06 RX ORDER — SODIUM CHLORIDE 9 MG/ML
1000 INJECTION, SOLUTION INTRAVENOUS
Refills: 0 | Status: DISCONTINUED | OUTPATIENT
Start: 2019-08-06 | End: 2019-08-07

## 2019-08-06 RX ORDER — INSULIN LISPRO 100/ML
VIAL (ML) SUBCUTANEOUS
Refills: 0 | Status: DISCONTINUED | OUTPATIENT
Start: 2019-08-06 | End: 2019-08-07

## 2019-08-06 RX ORDER — DEXTROSE 50 % IN WATER 50 %
15 SYRINGE (ML) INTRAVENOUS ONCE
Refills: 0 | Status: COMPLETED | OUTPATIENT
Start: 2019-08-06 | End: 2019-08-06

## 2019-08-06 RX ORDER — DULOXETINE HYDROCHLORIDE 30 MG/1
1 CAPSULE, DELAYED RELEASE ORAL
Qty: 0 | Refills: 0 | DISCHARGE

## 2019-08-06 RX ORDER — DEXTROSE 50 % IN WATER 50 %
15 SYRINGE (ML) INTRAVENOUS ONCE
Refills: 0 | Status: DISCONTINUED | OUTPATIENT
Start: 2019-08-06 | End: 2019-08-06

## 2019-08-06 RX ORDER — FERROUS SULFATE 325(65) MG
325 TABLET ORAL DAILY
Refills: 0 | Status: DISCONTINUED | OUTPATIENT
Start: 2019-08-06 | End: 2019-08-06

## 2019-08-06 RX ORDER — CARVEDILOL PHOSPHATE 80 MG/1
6.25 CAPSULE, EXTENDED RELEASE ORAL EVERY 12 HOURS
Refills: 0 | Status: DISCONTINUED | OUTPATIENT
Start: 2019-08-06 | End: 2019-08-07

## 2019-08-06 RX ORDER — DEXTROSE 50 % IN WATER 50 %
15 SYRINGE (ML) INTRAVENOUS ONCE
Refills: 0 | Status: DISCONTINUED | OUTPATIENT
Start: 2019-08-06 | End: 2019-08-07

## 2019-08-06 RX ORDER — ATORVASTATIN CALCIUM 80 MG/1
80 TABLET, FILM COATED ORAL AT BEDTIME
Refills: 0 | Status: DISCONTINUED | OUTPATIENT
Start: 2019-08-06 | End: 2019-08-07

## 2019-08-06 RX ORDER — GABAPENTIN 400 MG/1
100 CAPSULE ORAL THREE TIMES A DAY
Refills: 0 | Status: DISCONTINUED | OUTPATIENT
Start: 2019-08-06 | End: 2019-08-07

## 2019-08-06 RX ADMIN — GABAPENTIN 100 MILLIGRAM(S): 400 CAPSULE ORAL at 21:57

## 2019-08-06 RX ADMIN — PANTOPRAZOLE SODIUM 40 MILLIGRAM(S): 20 TABLET, DELAYED RELEASE ORAL at 19:38

## 2019-08-06 RX ADMIN — Medication 15 GRAM(S): at 21:44

## 2019-08-06 RX ADMIN — Medication 15 GRAM(S): at 21:13

## 2019-08-06 RX ADMIN — CARVEDILOL PHOSPHATE 6.25 MILLIGRAM(S): 80 CAPSULE, EXTENDED RELEASE ORAL at 19:38

## 2019-08-06 RX ADMIN — ATORVASTATIN CALCIUM 80 MILLIGRAM(S): 80 TABLET, FILM COATED ORAL at 21:57

## 2019-08-06 NOTE — H&P ADULT - ASSESSMENT
64 F with pmh of CAD, HTN, diastolic HF, and DM2 on metformin presents to ED after being sent for 64 F with pmh of CAD, HTN, diastolic HF, and DM2 on metformin presents to ED after being sent for abnormal lab result. Pt told his hb was 6 and needed to go to hospital for blood transfusion. Pt currently admitted for acute on chronic anemia likley 2/2 GI bleed and ROSAURA.    Admit to: Medicine  Monitored Bed  Ambulate as tolerated  VS per routine  Diet: NPO    Acute on chronic symptomatic anemia 2/2 suspected GI bleed and ROSAURA  Hb 7.3 on admission Melanotic stool on ER physical exam  started 2 units prbcs in ER  Protonix 40 IV BID  Pending iron study results, will replete as necessary  Hold ASA and plavix  Monitor H/H  F/u aemia panel   Pt w/ recent admission in May for hematochezia. Bleeding scan + in cecum at that time. Colonoscopy + for internal hemorrhoids. Bleeding had resolved. Suspected diverticular bleed.  GI consulted. Will likely perform endoscopy/colonoscopy in AM  Pt is NPO for GI procedure in AM. 64 F with pmh of CAD, HTN, diastolic HF, and DM2 on metformin presents to ED after being sent for abnormal lab result. Pt told his hb was 6 and needed to go to hospital for blood transfusion. Pt currently admitted for acute on chronic anemia likley 2/2 GI bleed and ROSAURA.    Admit to: Medicine  Monitored Bed  Ambulate as tolerated  VS per routine  Diet: NPO    Acute on chronic symptomatic anemia 2/2 suspected GI bleed and ROSAURA  Hb 7.3 on admission Melanotic stool on ER physical exam  Started 2 units prbcs in ER  Protonix 40 IV BID  Pending iron study results, will replete as necessary  Hold ASA and plavix  Monitor H/H  F/u anemia panel    Diastolic HF   Currently not in acute exacerbation  Previous echo in Feb w/ EF 75%  Continue coreg 6.25 BID (home dose w parameters)  Will hold home meds, Imdur, Torsamide and Lasix   Will monitor BP and add as needed. Held in setting of possible GI bleed.    HTN  currently stable  C/w coreg 6.25 BID (home med)  Pt w/ recent admission in May for hematochezia. Bleeding scan + in cecum at that time. Colonoscopy + for internal hemorrhoids. Bleeding had resolved. Suspected diverticular bleed.  GI consulted. Likely endoscopy/colonoscopy in AM  Pt is NPO for GI procedure in AM.    CKD Stage 3  Cr currently 1.65  monitor Cr  avoid nephrotoxic agents    DM 2  held home dose of novolog 10 units BID  continue ISS  f/u HbA1C    DVT PPX  VCD Boots 64 F with pmh of CAD, HTN, diastolic HF, and DM2 on metformin presents to ED after being sent for abnormal lab result. Pt told his hb was 6 and needed to go to hospital for blood transfusion. Pt currently admitted for acute on chronic anemia likley 2/2 GI bleed and ROSAURA.    Admit to: Medicine  Monitored Bed  Ambulate as tolerated  VS per routine  Diet: NPO    Acute on chronic symptomatic anemia 2/2 suspected GI bleed and ROSAURA  Hb 7.3 on admission Melanotic stool on ER physical exam  Started 2 units prbcs in ER  Protonix 40 IV BID  Pending iron study results, will replete as necessary  Will hold any antiplatelet agents  Monitor H/H  F/u anemia panel  GI recs appreciated. Will likely perform endoscopy/colonoscopy in AM.  NPO at this time.   c/w D5LR @75 ml/hr.      HFpEF with elevated Troponins. (Not in exacerbation at this time)   Initial Troponin 0.14. (Noted to have elevated troponin in past chart. Suspect due to inadequate clearance in setting of CKD3)  F/u repeat troponin  F/u EKG  Previous echo in Feb w/ EF 75%  Continue coreg 6.25 BID (home dose w parameters)  Will hold home meds, Imdur, Torsamide and Lasix   Will monitor BP and add as needed. Held in setting of possible GI bleed.    HTN  BP currently soft. Will trend pressures and adjust accordingly.   C/w coreg 6.25 BID (home med w/ parameters)  Pt w/ recent admission in May for hematochezia. Bleeding scan + in cecum at that time. Colonoscopy + for internal hemorrhoids. Bleeding had resolved. Suspected diverticular bleed.  GI consulted. Likely endoscopy/colonoscopy in AM  Pt is NPO for GI procedure in AM.    KEITH on CKD Stage 3  Cr currently 1.65.   Possibly due to prerenal sd in setting of possible GI Bleed  will monitor Cr  avoid nephrotoxic agents    DM 2  held home dose of novolog 10 units BID  continue ISS  f/u HbA1C    DVT PPX  VCD Boots 64 F with pmh of CAD, HTN, diastolic HF, and DM2 on metformin presents to ED after being sent for abnormal lab result. Pt told his hb was 6 and needed to go to hospital for blood transfusion. Pt currently admitted for acute on chronic anemia likley 2/2 GI bleed and ROSAURA.    Admit to: Medicine  Monitored Bed  Ambulate as tolerated  VS per routine  Diet: NPO    Acute on chronic symptomatic anemia 2/2 suspected GI bleed and ROSAURA  Hb 7.3 on admission Melanotic stool on ER physical exam  Started 2 units prbcs in ER  Protonix 40 IV BID  Pending iron study results, will replete as necessary  Will hold any antiplatelet agents  Monitor H/H  F/u anemia panel  GI recs appreciated. Will likely perform endoscopy/colonoscopy in AM.  NPO at this time.   c/w D5LR @75 ml/hr.      HFpEF with elevated Troponins. (Not in exacerbation at this time)   Initial Troponin 0.14. (Noted to have elevated troponin in past chart. Suspect due to inadequate clearance in setting of CKD3)  F/u repeat troponin  F/u EKG  Previous echo in Feb w/ EF 75%  Continue coreg 6.25 BID (home dose w parameters)  Will hold home meds, Imdur, Torsamide and Lasix   Will monitor BP and add as needed. Held in setting of possible GI bleed.    R Pleural Effusion  pt currently asymptomatic, hx of cough prior to admission  Seen by CT surgery on prior admission in May. At that time pt had no complaints/asymptomatic. Drainage held at that time.  Currently saturating appropriately on room air  CXR notes moderately sized R pleural effusion w/o change. Underlying parenchymal infiltrate, consolidation or atelectasis cannot be   excluded.  F/u CT Chest    HTN  BP currently soft. Will trend pressures and adjust accordingly.   C/w coreg 6.25 BID (home med w/ parameters)  Pt w/ recent admission in May for hematochezia. Bleeding scan + in cecum at that time. Colonoscopy + for internal hemorrhoids. Bleeding had resolved. Suspected diverticular bleed.  GI consulted. Likely endoscopy/colonoscopy in AM  Pt is NPO for GI procedure in AM.    Hyperkalemia   K 5.8, likely hemolyzed sample  f/u EKG  f/u repeat AM labs    KEITH on CKD Stage 3  Cr currently 1.65.   Possibly due to prerenal sd in setting of possible GI Bleed  will monitor Cr  avoid nephrotoxic agents    DM 2  held home dose of novolog 10 units BID  continue ISS  f/u HbA1C    DVT PPX  VCD Boots 64 F with pmh of CAD, HTN, diastolic HF, and DM2 on metformin presents to ED after being sent for abnormal lab result. Pt told his hb was 6 and needed to go to hospital for blood transfusion. Pt currently admitted for acute on chronic anemia likley 2/2 GI bleed and ROSAURA.    Admit to: Medicine  Monitored Bed  Ambulate as tolerated  VS per routine  Diet: NPO    Acute on chronic symptomatic anemia 2/2 suspected GI bleed and ROSAURA  Hb 7.3 on admission Melanotic stool on ER physical exam  Started 2 units prbcs in ER  Protonix 40 IV BID  Pending iron study results, will replete as necessary  Will hold any antiplatelet agents  Monitor H/H  Anemia panel: Total Iron 335 (elevated), Ferritin 102 (N), TIBC 419 (N), % Sat 80% ( elevated), Transferrin 293 (N)  GI recs appreciated. Will likely perform endoscopy/colonoscopy in AM.  NPO at this time.   c/w D5LR @75 ml/hr.    R Pleural Effusion  pt currently asymptomatic, hx of cough prior to admission  Seen by CT surgery on prior admission in May. At that time pt had no complaints/asymptomatic. Drainage held at that time.  Currently saturating appropriately on room air  CXR notes moderately sized R pleural effusion w/o change. Underlying parenchymal infiltrate, consolidation or atelectasis cannot be   excluded.  F/u CT Chest    HFpEF with elevated Troponins. (Not in exacerbation at this time)   Initial Troponin 0.14. (Noted to have elevated troponin in past chart. Suspect due to inadequate clearance in setting of CKD3)  F/u repeat troponin  F/u EKG  Previous echo in Feb w/ EF 75%  Continue coreg 6.25 BID (home dose w parameters)  Will hold home meds, Imdur, Torsamide and Lasix   Will monitor BP and add as needed. Held in setting of possible GI bleed.    History of CAD  c/w Lipitor 80mg PO qd  Held ASA and Plavix due to suspected GI bleed    HTN  BP currently soft. Will trend pressures and adjust accordingly.   C/w coreg 6.25 BID (home med w/ parameters)  Pt w/ recent admission in May for hematochezia. Bleeding scan + in cecum at that time. Colonoscopy + for internal hemorrhoids. Bleeding had resolved. Suspected diverticular bleed.  GI consulted. Likely endoscopy/colonoscopy in AM  Pt is NPO for GI procedure in AM.    Hyperkalemia   K 5.8, likely hemolyzed sample  f/u EKG  f/u repeat AM labs    KEITH on CKD Stage 3  Cr currently 1.65.   Likely prerenal  in setting of possible GI Bleed  will monitor Cr  avoid nephrotoxic agents    DM 2  held home dose of Novolog 10 units BID  continue ISS  f/u HbA1C    DVT PPX  VCD Boots ( No chem ppx due to suspected bleed) 64 F with pmh of CAD, HTN, diastolic HF, and DM2 on metformin presents to ED after being sent for abnormal lab result. Pt told his hb was 6 and needed to go to hospital for blood transfusion. Pt currently admitted for acute on chronic anemia likley 2/2 GI bleed and ROSAURA.    Admit to: Medicine  Monitored Bed  Ambulate as tolerated  VS per routine  Diet: NPO    Acute on chronic symptomatic anemia 2/2 suspected GI bleed and ROSAURA  Hb 7.3 on admission Melanotic stool on ER physical exam  Started 2 units prbcs in ER  Protonix 40 IV BID  Pending iron study results, will replete as necessary  Will hold any antiplatelet agents  Monitor H/H  Anemia panel: Total Iron 335 (elevated), Ferritin 102 (N), TIBC 419 (N), % Sat 80% ( elevated), Transferrin 293 (N)  GI recs appreciated. Will likely perform endoscopy/colonoscopy in AM.  NPO at this time.   c/w D5LR @75 ml/hr.    R Pleural Effusion  pt currently asymptomatic, hx of cough prior to admission  Seen by CT surgery on prior admission in May. At that time pt had no complaints/asymptomatic. Drainage held at that time.  Currently saturating appropriately on room air  CXR notes moderately sized R pleural effusion w/o change. Underlying parenchymal infiltrate, consolidation or atelectasis cannot be   excluded.  F/u CT Chest    HFpEF with elevated Troponins. (Not in exacerbation at this time)   Initial Troponin 0.14. (Noted to have elevated troponin in past chart. Suspect due to inadequate clearance in setting of CKD3)  F/u repeat troponin  F/u EKG  Previous echo in Feb w/ EF 75%  Continue coreg 6.25 BID (home dose w parameters)  Will hold home meds, Imdur, Torsamide and Lasix   Will monitor BP and add as needed. Held in setting of possible GI bleed.    History of CAD  c/w Lipitor 80mg PO qd  Held ASA and Plavix due to suspected GI bleed    HTN  BP currently soft. Will trend pressures and adjust accordingly.   C/w coreg 6.25 BID (home med w/ parameters)  Pt w/ recent admission in May for hematochezia. Bleeding scan + in cecum at that time. Colonoscopy + for internal hemorrhoids. Bleeding had resolved. Suspected diverticular bleed.  GI consulted. Likely endoscopy/colonoscopy in AM  Pt is NPO for GI procedure in AM.    Hyperkalemia   K 5.8, likely hemolyzed sample  f/u EKG  f/u repeat AM labs    KEITH on CKD Stage 3  Cr currently 1.65.   Likely prerenal  in setting of possible GI Bleed  will monitor Cr  avoid nephrotoxic agents    DM 2  held home dose of Novolog 10 units BID  continue ISS  f/u HbA1C    Chronic back pain  Prescribed oxycodone use x 1 year in past  c/w Tylenol 650mg PO prn for pain    DVT PPX  VCD Boots ( No chem ppx due to suspected bleed) 64 F with pmh of CAD, HTN, diastolic HF, and DM2 on metformin presents to ED after being sent for abnormal lab result. Pt told his hb was 6 and needed to go to hospital for blood transfusion. Pt currently admitted for acute on chronic anemia likley 2/2 GI bleed and ROSAURA.    Admit to: Medicine  Monitored Bed  Ambulate as tolerated  VS per routine  Diet: NPO    Acute on chronic symptomatic anemia 2/2 suspected GI bleed and ROSAURA  Hb 7.3 on admission Melanotic stool on ER doctor's physical exam  + FOBT  Started 2 units pRBCs in ER  Protonix 40 IV BID  Pending iron study results, will replete as necessary  Will hold any antiplatelet agents  Monitor H/H  Anemia panel: Total Iron 335 (elevated), Ferritin 102 (N), TIBC 419 (N), % Sat 80% ( elevated), Transferrin 293 (N)  GI recs appreciated. Will likely perform endoscopy/colonoscopy in AM.  NPO at this time.   c/w D5LR @75 ml/hr.  f/u AM labs    R Pleural Effusion  pt currently asymptomatic, hx of cough prior to admission  Seen by CT surgery on prior admission in May. At that time pt had no complaints/asymptomatic. Drainage held at that time.  Currently saturating appropriately on room air  CXR notes moderately sized R pleural effusion w/o change. Underlying parenchymal infiltrate, consolidation or atelectasis cannot be   excluded.  F/u CT Chest    HFpEF with elevated Troponins. (Not in exacerbation at this time)   Initial Troponin 0.14. (Noted to have elevated troponin in past chart. Suspect due to inadequate clearance in setting of CKD3)  F/u repeat troponin  F/u EKG  Previous echo in Feb w/ EF 75%  Continue coreg 6.25 BID (home dose w parameters)  Will hold home meds, Imdur, Torsamide and Lasix   Will monitor BP and add as needed. Held in setting of possible GI bleed.    History of CAD  c/w Lipitor 80mg PO qd  Held ASA and Plavix due to suspected GI bleed    HTN  BP currently soft. Will trend pressures and adjust accordingly.   C/w coreg 6.25 BID (home med w/ parameters)  Pt w/ recent admission in May for hematochezia. Bleeding scan + in cecum at that time. Colonoscopy + for internal hemorrhoids. Bleeding had resolved. Suspected diverticular bleed.  GI consulted. Likely endoscopy/colonoscopy in AM  Pt is NPO for GI procedure in AM.    Hyperkalemia   K 5.8, likely hemolyzed sample  f/u EKG  f/u repeat AM labs    KEITH on CKD Stage 3  Cr currently 1.65.   Likely prerenal  in setting of possible GI Bleed  will monitor Cr  avoid nephrotoxic agents    DM 2  held home dose of Novolog 10 units BID  continue ISS  f/u HbA1C    Chronic back pain  Prescribed oxycodone use x 1 year in past  c/w Tylenol 650mg PO prn for pain    DVT PPX  VCD Boots ( No chem ppx due to suspected bleed) 64 F with pmh of CAD, HTN, diastolic HF, and DM2 on metformin presents to ED after being sent for abnormal lab result. Pt told his hb was 6 and needed to go to hospital for blood transfusion. Pt currently admitted for acute on chronic anemia likley 2/2 GI bleed and ROSAURA.    Admit to: Medicine  Monitored Bed  Ambulate as tolerated  VS per routine  Diet: NPO    Acute on chronic symptomatic anemia 2/2 suspected GI bleed and ROSAURA  Hb 7.3 on admission Melanotic stool on ER doctor's physical exam  + FOBT  Started 2 units pRBCs in ER  Protonix 40 IV BID  Pending iron study results, will replete as necessary  Will hold any antiplatelet agents  Monitor H/H  Anemia panel: Total Iron 335 (elevated), Ferritin 102 (N), TIBC 419 (N), % Sat 80% ( elevated), Transferrin 293 (N)  GI recs appreciated. Will likely perform endoscopy/colonoscopy in AM.  NPO at this time.   c/w D5LR @75 ml/hr.  f/u AM labs    R Pleural Effusion  pt currently asymptomatic, hx of cough prior to admission  Seen by CT surgery on prior admission in May. At that time pt had no complaints/asymptomatic. Drainage held at that time.  Currently saturating appropriately on room air  CXR notes moderately sized R pleural effusion w/o change. Underlying parenchymal infiltrate, consolidation or atelectasis cannot be   excluded.  F/u CT Chest    HFpEF/ chronic diastolic chf / with elevated Troponins. (Not in exacerbation at this time)   Initial Troponin 0.14. (Noted to have elevated troponin in past chart. Suspect due to inadequate clearance in setting of CKD3)  F/u repeat troponin  F/u EKG  Previous echo in Feb w/ EF 75%  Continue coreg 6.25 BID (home dose w parameters)  Will hold home meds, Imdur, Torsamide and Lasix   Will monitor BP and add as needed. Held in setting of possible GI bleed.    History of CAD  c/w Lipitor 80mg PO qd  Held ASA and Plavix due to suspected GI bleed    HTN  BP currently soft. Will trend pressures and adjust accordingly.   C/w coreg 6.25 BID (home med w/ parameters)  Pt w/ recent admission in May for hematochezia. Bleeding scan + in cecum at that time. Colonoscopy + for internal hemorrhoids. Bleeding had resolved. Suspected diverticular bleed.  GI consulted. Likely endoscopy/colonoscopy in AM  Pt is NPO for GI procedure in AM.    Hyperkalemia   K 5.8, likely hemolyzed sample  f/u EKG  f/u repeat AM labs    KEITH on CKD Stage 3  Cr currently 1.65.   Likely prerenal  in setting of possible GI Bleed  will monitor Cr  avoid nephrotoxic agents    DM 2  held home dose of Novolog 10 units BID  continue ISS  f/u HbA1C    Chronic back pain  Prescribed oxycodone use x 1 year in past  c/w Tylenol 650mg PO prn for pain    DVT PPX  VCD Boots ( No chem ppx due to suspected bleed)

## 2019-08-06 NOTE — ED STATDOCS - CLINICAL SUMMARY MEDICAL DECISION MAKING FREE TEXT BOX
Chronic cough, will obtain CXR. Reported anemia with history of transfusion secondary to GI bleed. Lab, possible obs for transfusion.

## 2019-08-06 NOTE — ED STATDOCS - OBJECTIVE STATEMENT
Pt is a 65 y/o F, with PMHx of CAD s/p CABG and stents x2, CHF, depression, DM, GERD, HLD, HTN, PAD, anemia requiring transfusion, presenting to the ED with abnormal lab results. Pt states she had routine blood work for eval of a intermittently productive cough she has had for the last 3 months. She was called earlier today with a result of low hgb and advised to come to the ED for transfusion. She admits to feeling occasional palpitations and SOB that is worse with exertion. Denies CP, fever, abd pain, N/V, melena, and BRBPR. Last transfusion was several months ago related to GI bleed. Last colonoscopy was 5 months ago. Pt is on iron supplements.   Lissette-

## 2019-08-06 NOTE — H&P ADULT - NSHPPHYSICALEXAM_GEN_ALL_CORE
General: NAD, awake alert, obese female on stretcher  HEENT: atraumatic, normocephalic, R eye PERRLA, L eye with severe cataracts/blind, EOMI, Mucous membranes are moist.  NECK: Supple  CVR: Regular rate and rhythm, Normal s1, s2 , no murmurs, rubs or gallops.  LUNG: Clear to auscultation bilaterally. No wheezing, crackles, ronchi.  ABDOMEN: + Bowel Sounds x4, soft nondistended, not tender to palpation, no garding or rigidity.  EXT: 2+ Peripheral Pulses, No clubbing, cyanosis, or edema. Chronic surgical scar on R medial aspect of knee approx 6 inches. 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 Strength and sensation 5/5 in BL upper and lower ext.   SKIN: Intact, Warm, Dry

## 2019-08-06 NOTE — ED ADULT TRIAGE NOTE - CHIEF COMPLAINT QUOTE
sent in for low hemoglobin. Sates last transfusion was March.  received call yesterday. No active bleeding, no chest pain, no dyspnea

## 2019-08-06 NOTE — H&P ADULT - NSHPLABSRESULTS_GEN_ALL_CORE
7.3    5.38  )-----------( 264      ( 06 Aug 2019 14:56 )             22.8     08-06    140  |  102  |  51.0<H>  ----------------------------<  75  5.8<H>   |  29.0  |  1.65<H>    Ca    8.6      06 Aug 2019 14:55    TPro  7.1  /  Alb  3.4  /  TBili  0.6  /  DBili  x   /  AST  32<H>  /  ALT  19  /  AlkPhos  132<H>  08-06    Trop 0.14 7.3    5.38  )-----------( 264      ( 06 Aug 2019 14:56 )             22.8     08-06    140  |  102  |  51.0<H>  ----------------------------<  75  5.8<H>   |  29.0  |  1.65<H>    Ca    8.6      06 Aug 2019 14:55    TPro  7.1  /  Alb  3.4  /  TBili  0.6  /  DBili  x   /  AST  32<H>  /  ALT  19  /  AlkPhos  132<H>  08-06    Trop 0.14      CXR IMPRESSION: Moderately sized right pleural effusion, without change.   Underlying parenchymal infiltrate, consolidation or atelectasis cannot be   excluded. 7.3    5.38  )-----------( 264      ( 06 Aug 2019 14:56 )             22.8     08-06    140  |  102  |  51.0<H>  ----------------------------<  75  5.8<H>   |  29.0  |  1.65<H>    Ca    8.6      06 Aug 2019 14:55    TPro  7.1  /  Alb  3.4  /  TBili  0.6  /  DBili  x   /  AST  32<H>  /  ALT  19  /  AlkPhos  132<H>  08-06    PT/INR - ( 06 Aug 2019 16:03 )   PT: 15.6 sec;   INR: 1.34 ratio      PTT - ( 06 Aug 2019 16:03 )  PTT:28.3 sec    Trop 0.14    Total Iron 335 (elevated)  Ferritin 102 (N)  TIBC 419 (N)  % Sat 80% ( elevated)  Transferrin 293 (N)    CXR IMPRESSION: Moderately sized right pleural effusion, without change.   Underlying parenchymal infiltrate, consolidation or atelectasis cannot be   excluded.

## 2019-08-06 NOTE — ED STATDOCS - GASTROINTESTINAL, MLM
abdomen soft, non-tender, and non-distended. Bowel sounds present. abdomen soft, non-tender, and non-distended. Bowel sounds present. Rectal exam: no tenderness, no gross blood, no obvious external hemorrhoids abdomen soft, non-tender, and non-distended. Bowel sounds present. Rectal exam: no tenderness, gross melanotic appearing stool, no obvious external hemorrhoids

## 2019-08-06 NOTE — H&P ADULT - HISTORY OF PRESENT ILLNESS
64 F with pmh of CAD, HTN, diastolic HF, and DM2 on metformin presents to ED after being sent for abnormal lab result. 3 weeks prior pt was having a cough. Lab results were ordered at that time. Yesterday PMD called pt because Hb was noted to be 6 and instructed to go to ER for blood transfusion. Admits to feeling SOB when walking recently. Denies chest pain, palpitations, dizziness, feeling light headed, or visual disturbances. Had similar ER visit in May when she was bleeding bright red blood per rectum. At that time she received prbc's and had a bowel positive bleeding scan in cecum. Colonoscopy at that time was negative for GI bleed but noted some internal hemorrhoids. Presumed to be due to diverticular bleeds. Currently denies any melena or hematochezia. No colonoscopy prior to may admission. No family history of Colon Ca or other malignancies. States she has BMs daily. Denies any bloody stools. Takes one baby aspirin daily.(Med rec notes Plavix 75mg qd)

## 2019-08-07 ENCOUNTER — TRANSCRIPTION ENCOUNTER (OUTPATIENT)
Age: 64
End: 2019-08-07

## 2019-08-07 VITALS
RESPIRATION RATE: 18 BRPM | HEART RATE: 66 BPM | SYSTOLIC BLOOD PRESSURE: 123 MMHG | OXYGEN SATURATION: 98 % | TEMPERATURE: 98 F | DIASTOLIC BLOOD PRESSURE: 69 MMHG

## 2019-08-07 LAB
ANION GAP SERPL CALC-SCNC: 9 MMOL/L — SIGNIFICANT CHANGE UP (ref 5–17)
BLD GP AB SCN SERPL QL: SIGNIFICANT CHANGE UP
BUN SERPL-MCNC: 48 MG/DL — HIGH (ref 8–20)
CALCIUM SERPL-MCNC: 8.3 MG/DL — LOW (ref 8.6–10.2)
CHLORIDE SERPL-SCNC: 101 MMOL/L — SIGNIFICANT CHANGE UP (ref 98–107)
CO2 SERPL-SCNC: 26 MMOL/L — SIGNIFICANT CHANGE UP (ref 22–29)
CREAT SERPL-MCNC: 1.62 MG/DL — HIGH (ref 0.5–1.3)
GLUCOSE BLDC GLUCOMTR-MCNC: 153 MG/DL — HIGH (ref 70–99)
GLUCOSE BLDC GLUCOMTR-MCNC: 197 MG/DL — HIGH (ref 70–99)
GLUCOSE BLDC GLUCOMTR-MCNC: 209 MG/DL — HIGH (ref 70–99)
GLUCOSE BLDC GLUCOMTR-MCNC: 79 MG/DL — SIGNIFICANT CHANGE UP (ref 70–99)
GLUCOSE SERPL-MCNC: 152 MG/DL — HIGH (ref 70–115)
HBA1C BLD-MCNC: 5.9 % — HIGH (ref 4–5.6)
HCT VFR BLD CALC: 26.1 % — LOW (ref 34.5–45)
HGB BLD-MCNC: 8.7 G/DL — LOW (ref 11.5–15.5)
MCHC RBC-ENTMCNC: 27.9 PG — SIGNIFICANT CHANGE UP (ref 27–34)
MCHC RBC-ENTMCNC: 33.3 GM/DL — SIGNIFICANT CHANGE UP (ref 32–36)
MCV RBC AUTO: 83.7 FL — SIGNIFICANT CHANGE UP (ref 80–100)
NRBC # BLD: 1 /100 WBCS — HIGH (ref 0–0)
PLATELET # BLD AUTO: 243 K/UL — SIGNIFICANT CHANGE UP (ref 150–400)
POTASSIUM SERPL-MCNC: 5.2 MMOL/L — SIGNIFICANT CHANGE UP (ref 3.5–5.3)
POTASSIUM SERPL-SCNC: 5.2 MMOL/L — SIGNIFICANT CHANGE UP (ref 3.5–5.3)
RBC # BLD: 3.12 M/UL — LOW (ref 3.8–5.2)
RBC # FLD: 16 % — HIGH (ref 10.3–14.5)
SODIUM SERPL-SCNC: 136 MMOL/L — SIGNIFICANT CHANGE UP (ref 135–145)
TROPONIN T SERPL-MCNC: 0.11 NG/ML — HIGH (ref 0–0.06)
WBC # BLD: 6.03 K/UL — SIGNIFICANT CHANGE UP (ref 3.8–10.5)
WBC # FLD AUTO: 6.03 K/UL — SIGNIFICANT CHANGE UP (ref 3.8–10.5)

## 2019-08-07 PROCEDURE — 99239 HOSP IP/OBS DSCHRG MGMT >30: CPT | Mod: GC

## 2019-08-07 PROCEDURE — 86850 RBC ANTIBODY SCREEN: CPT

## 2019-08-07 PROCEDURE — 82272 OCCULT BLD FECES 1-3 TESTS: CPT

## 2019-08-07 PROCEDURE — 99285 EMERGENCY DEPT VISIT HI MDM: CPT | Mod: 25

## 2019-08-07 PROCEDURE — 83550 IRON BINDING TEST: CPT

## 2019-08-07 PROCEDURE — 36415 COLL VENOUS BLD VENIPUNCTURE: CPT

## 2019-08-07 PROCEDURE — 85730 THROMBOPLASTIN TIME PARTIAL: CPT

## 2019-08-07 PROCEDURE — 84484 ASSAY OF TROPONIN QUANT: CPT

## 2019-08-07 PROCEDURE — 71046 X-RAY EXAM CHEST 2 VIEWS: CPT

## 2019-08-07 PROCEDURE — 93005 ELECTROCARDIOGRAM TRACING: CPT

## 2019-08-07 PROCEDURE — 80048 BASIC METABOLIC PNL TOTAL CA: CPT

## 2019-08-07 PROCEDURE — 86923 COMPATIBILITY TEST ELECTRIC: CPT

## 2019-08-07 PROCEDURE — 85610 PROTHROMBIN TIME: CPT

## 2019-08-07 PROCEDURE — 82962 GLUCOSE BLOOD TEST: CPT

## 2019-08-07 PROCEDURE — 80053 COMPREHEN METABOLIC PANEL: CPT

## 2019-08-07 PROCEDURE — 97163 PT EVAL HIGH COMPLEX 45 MIN: CPT

## 2019-08-07 PROCEDURE — 71250 CT THORAX DX C-: CPT

## 2019-08-07 PROCEDURE — 85045 AUTOMATED RETICULOCYTE COUNT: CPT

## 2019-08-07 PROCEDURE — P9016: CPT

## 2019-08-07 PROCEDURE — 36430 TRANSFUSION BLD/BLD COMPNT: CPT

## 2019-08-07 PROCEDURE — 83540 ASSAY OF IRON: CPT

## 2019-08-07 PROCEDURE — 86900 BLOOD TYPING SEROLOGIC ABO: CPT

## 2019-08-07 PROCEDURE — 84466 ASSAY OF TRANSFERRIN: CPT

## 2019-08-07 PROCEDURE — 85027 COMPLETE CBC AUTOMATED: CPT

## 2019-08-07 PROCEDURE — 82728 ASSAY OF FERRITIN: CPT

## 2019-08-07 PROCEDURE — 86901 BLOOD TYPING SEROLOGIC RH(D): CPT

## 2019-08-07 PROCEDURE — 83036 HEMOGLOBIN GLYCOSYLATED A1C: CPT

## 2019-08-07 PROCEDURE — 99223 1ST HOSP IP/OBS HIGH 75: CPT

## 2019-08-07 RX ORDER — ASPIRIN/CALCIUM CARB/MAGNESIUM 324 MG
1 TABLET ORAL
Qty: 0 | Refills: 0 | DISCHARGE

## 2019-08-07 RX ORDER — PANTOPRAZOLE SODIUM 20 MG/1
40 TABLET, DELAYED RELEASE ORAL
Refills: 0 | Status: DISCONTINUED | OUTPATIENT
Start: 2019-08-07 | End: 2019-08-07

## 2019-08-07 RX ORDER — FERROUS SULFATE 325(65) MG
1 TABLET ORAL
Qty: 0 | Refills: 0 | DISCHARGE

## 2019-08-07 RX ADMIN — Medication 650 MILLIGRAM(S): at 13:45

## 2019-08-07 RX ADMIN — Medication 1: at 12:31

## 2019-08-07 RX ADMIN — CARVEDILOL PHOSPHATE 6.25 MILLIGRAM(S): 80 CAPSULE, EXTENDED RELEASE ORAL at 17:36

## 2019-08-07 RX ADMIN — Medication 650 MILLIGRAM(S): at 02:28

## 2019-08-07 RX ADMIN — Medication 650 MILLIGRAM(S): at 17:37

## 2019-08-07 RX ADMIN — PANTOPRAZOLE SODIUM 40 MILLIGRAM(S): 20 TABLET, DELAYED RELEASE ORAL at 17:36

## 2019-08-07 RX ADMIN — Medication 650 MILLIGRAM(S): at 03:00

## 2019-08-07 RX ADMIN — GABAPENTIN 100 MILLIGRAM(S): 400 CAPSULE ORAL at 13:45

## 2019-08-07 RX ADMIN — Medication 1: at 15:06

## 2019-08-07 RX ADMIN — GABAPENTIN 100 MILLIGRAM(S): 400 CAPSULE ORAL at 06:04

## 2019-08-07 RX ADMIN — Medication 650 MILLIGRAM(S): at 14:20

## 2019-08-07 RX ADMIN — PANTOPRAZOLE SODIUM 40 MILLIGRAM(S): 20 TABLET, DELAYED RELEASE ORAL at 06:04

## 2019-08-07 NOTE — PROCEDURE NOTE - NSPROCDETAILS_GEN_ALL_CORE
flushes easily/location identified, draped/prepped, sterile technique used/blood seen on insertion/secured in place/dressing applied/sterile technique, catheter placed

## 2019-08-07 NOTE — DISCHARGE NOTE PROVIDER - HOSPITAL COURSE
64 F with pmh of CAD, HTN, diastolic HF, and DM2 on metformin presents to ED after being sent by PMD for hemoglobin of 6 and BOONE. Patient was found to have hemoglobin of 7.3 in ED, s/p 2 units PRBC transfusion with good response. patient also noted to have grossly melanotic stool, +FOBT patient was admitted for acute on chronic anemia likely 2/2 to GIB due to grossly melanotic stool found on exam in ED as well as +FOBT. Patient was placed on IVF, IV protonix, GI was consulted who stated patient to be kept NPO for endoscopy/colonoscopy in am. Home aspirin and plavix also due to suspected GIB. Patient h/h also monitored closely, which showed appropriate response to transfusion. Patient also refused endoscopy/colonoscopy in am stating she had it done in the past and it did not show any definitive results, patient counselled at length about need for proper workup to evaluate cause of anemia, but patient still refused inpatient workup. GI states patient can followup outpatient within 1 week for capsule endoscopy, need to stop iron 7 days prior to procedure, patient understands and agrees with current plan to followup outpatient with GI. Patient also made aware of right sided pleural effusion on imaging, no change from previous scans, need to monitor, follow up with PMD Dr. Connors within 1 week to discuss current hospitalization, medication changes. Patient advised to follow up with cardiology in regards to restarting aspirin and plavix.         Patient medically optimized for discharge.     Time spent: 45 minutes                    Vital Signs Last 24 Hrs    T(F): 97.7 (07 Aug 2019 11:33), Max: 98.7 (07 Aug 2019 05:00)    HR: 68 (07 Aug 2019 11:33) (60 - 84)    BP: 162/80 (07 Aug 2019 11:33) (103/62 - 162/80)    RR: 18 (07 Aug 2019 11:33) (18 - 20)    SpO2: 97% (07 Aug 2019 11:33) (90% - 97%)            Physical Exam:     General: NAD, awake alert, obese female on stretcher    	HEENT: atraumatic, normocephalic, R eye PERRLA, L eye with severe cataracts/blind, EOMI, Mucous membranes are moist.    	NECK: Supple    	CVR: Regular rate and rhythm, Normal s1, s2 , no murmurs, rubs or gallops.    	LUNG: Clear to auscultation bilaterally. No wheezing, crackles, ronchi.    	ABDOMEN: + Bowel Sounds x4, soft nondistended, not tender to palpation, no garding or rigidity.    	EXT: 2+ Peripheral Pulses, No clubbing, cyanosis, or edema. Chronic surgical scar on R medial aspect of knee approx 6 inches. 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 Patient was found to have hemoglobin of 7.3 in ED, s/p 2 units PRBC transfusion with good response. patient also noted to have grossly melanotic stool, +FOBT patient was admitted for acute on chronic anemia likely 2/2 to GIB due to grossly melanotic stool found on exam in ED as well as +FOBT. Patient was placed on IVF, IV protonix, GI was consulted who stated patient to be kept NPO for endoscopy/colonoscopy in am. Home aspirin and plavix also held due to suspected GIB. Patient h/h also monitored closely, which showed increase in hemoglobin to 8.7 after transfusion, patient given 3rd unit PRBC prior to dc for better response. Patient also refused endoscopy/colonoscopy during admission, stating she had it done in the past and it did not show any definitive results, patient counselled at length about need for proper workup to evaluate cause of anemia, but patient still refused inpatient workup. GI states patient can followup outpatient within 1 week for capsule endoscopy, patient to hold iron 7 days prior to procedure, patient understands and agrees with current plan to follow up outpatient with GI. Patient also made aware of right sided pleural effusion on imaging, currently asymptomatic, no change from previous scans, need to monitor, follow up with PMD Dr. Connors within 1 week to discuss current hospitalization, medication changes. Patient advised to follow up with CT surgery also in regards to pleural effusion.             Patient medically optimized for discharge.     Time spent: 45 minutes            Vital Signs Last 24 Hrs    T(F): 97.7 (07 Aug 2019 11:33), Max: 98.7 (07 Aug 2019 05:00)    HR: 68 (07 Aug 2019 11:33) (60 - 84)    BP: 162/80 (07 Aug 2019 11:33) (103/62 - 162/80)    RR: 18 (07 Aug 2019 11:33) (18 - 20)    SpO2: 97% (07 Aug 2019 11:33) (90% - 97%)            Physical Exam:     General: NAD, awake alert, obese female on stretcher    	HEENT: atraumatic, normocephalic, R eye PERRLA, L eye with severe cataracts/blind, EOMI, Mucous membranes are moist.    	NECK: Supple    	CVR: Regular rate and rhythm, Normal s1, s2 , no murmurs, rubs or gallops.    	LUNG: Clear to auscultation bilaterally. No wheezing, crackles, ronchi.    	ABDOMEN: + Bowel Sounds x4, soft nondistended, not tender to palpation, no garding or rigidity.    	EXT: 2+ Peripheral Pulses, No clubbing, cyanosis, or edema. Chronic surgical scar on R medial aspect of knee approx 6 inches. 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 Patient was found to have hemoglobin of 7.3 in ED, s/p 2 units PRBC transfusion with good response. patient also noted to have grossly melanotic stool, +FOBT patient was admitted for acute on chronic anemia likely 2/2 to GIB due to grossly melanotic stool found on exam in ED as well as +FOBT. Patient was placed on IVF, IV protonix, GI was consulted who stated patient to be kept NPO for endoscopy/colonoscopy in am. Home aspirin and plavix also held due to suspected GIB. Patient h/h also monitored closely, which showed increase in hemoglobin to 8.7 after transfusion, patient given 3rd unit PRBC prior to dc for better response. Patient also refused endoscopy/colonoscopy during admission, stating she had it done in the past and it did not show any definitive results, patient counselled at length about need for proper workup to evaluate cause of anemia, but patient still refused inpatient workup. GI states patient can followup outpatient within 1 week for capsule endoscopy, patient to hold iron 7 days prior to procedure, patient understands and agrees with current plan to follow up outpatient with GI. Patient also made aware of right sided pleural effusion on imaging, currently asymptomatic, no change from previous scans, need to monitor, follow up with PMD Dr. Connors within 1 week to discuss current hospitalization, medication changes. Patient advised to follow up with CT surgery also in regards to pleural effusion.         Vital Signs Last 24 Hrs    T(F): 97.7 (07 Aug 2019 11:33), Max: 98.7 (07 Aug 2019 05:00)    HR: 68 (07 Aug 2019 11:33) (60 - 84)    BP: 162/80 (07 Aug 2019 11:33) (103/62 - 162/80)    RR: 18 (07 Aug 2019 11:33) (18 - 20)    SpO2: 97% (07 Aug 2019 11:33) (90% - 97%)            Physical Exam:     General: NAD, awake alert, obese female on stretcher    	HEENT: atraumatic, normocephalic, R eye PERRLA, L eye with severe cataracts/blind, EOMI, Mucous membranes are moist.    	NECK: Supple    	CVR: Regular rate and rhythm, Normal s1, s2 , no murmurs, rubs or gallops.    	LUNG: Clear to auscultation bilaterally. No wheezing, crackles, ronchi.    	ABDOMEN: + Bowel Sounds x4, soft nondistended, not tender to palpation, no garding or rigidity.    	EXT: 2+ Peripheral Pulses, No clubbing, cyanosis, or edema. Chronic surgical scar on R medial aspect of knee approx 6 inches. 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     Time spent: 45 minutes

## 2019-08-07 NOTE — DISCHARGE NOTE PROVIDER - CARE PROVIDERS DIRECT ADDRESSES
,[email or web address]_Unknown ,[email or web address],DirectAddress_Unknown,[email or web address]

## 2019-08-07 NOTE — CONSULT NOTE ADULT - SUBJECTIVE AND OBJECTIVE BOX
HPI:  64 F with pmh of CAD, 2 stents, on chronic aspirin, HTN, diastolic HF, and DM2 on metformin presents to ED after being sent in for admission for abnormal lab result.  Yesterday PMD called pt because Hb was noted to be 6 and instructed to go to ER for blood transfusion. Admits to feeling SOB when walking recently. Denies chest pain, palpitations, dizziness, feeling light headed, or visual disturbances. Had similar ER visit in May when she was bleeding bright red blood per rectum. At that time she received prbc's and had a bowel positive bleeding scan in cecum. Colonoscopy, 2019,  at that time was negative for GI bleed but noted some internal hemorrhoids. Presumed to be due to diverticular bleeds.  During a  prior admission for symptomatic anemia in 2019, ccolonoscopy showed a 1 cm TA which was removed from the Hepatic flexure.  Study was otherwise negative.  EGD was normal as well.  No varices or PHG.    Currently denies any melena or hematochezia. Stools are dark while on Iron supplements.  Recently discontinued from the Plavix due to recurrent anemia.  Cardiology:  Dr. Recio.   No family history of Colon Ca or other malignancies.  Denies weight loss or abdominal pain.  Denies heartburn or regurgitation; has recently been on Pantoprazole along with the aspirin;  no other anticoagulants.   States she has BMs daily. Denies any bloody stools or diarrhea.  No weight loss or abdominal pain.    In ED tranmsfused 2 units of PRBC's, uneventfully.        PAST MEDICAL & SURGICAL HISTORY:  Congestive heart failure, unspecified HF chronicity, unspecified heart failure type  Insomnia  Retinal detachment  CAD (coronary artery disease)  Glaucoma  Urinary retention  Peripheral neuropathy  GERD (gastroesophageal reflux disease)  Depression  Diabetes  Hypertension, unspecified type  Herniated disc, cervical  PAD (peripheral artery disease)  Legally blind  History of peripheral vascular disease  History of retinal detachment  History of CEA (carotid endarterectomy): Right  Hyperlipidemia  Glaucoma  Hypertension  Diabetes  No significant past surgical history  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  No dyspepsia  No abdominal pain.    No Nausea, vomiting.  No Bleeding.  No hematemesis.   No diarrhea.    No hematochezia.  No weight loss, anorexia.  No edema.      MEDICATIONS  (STANDING):  atorvastatin 80 milliGRAM(s) Oral at bedtime  carvedilol 6.25 milliGRAM(s) Oral every 12 hours  dextrose 5% + lactated ringers. 1000 milliLiter(s) (75 mL/Hr) IV Continuous <Continuous>  dextrose 5%. 1000 milliLiter(s) (50 mL/Hr) IV Continuous <Continuous>  dextrose 50% Injectable 12.5 Gram(s) IV Push once  dextrose 50% Injectable 25 Gram(s) IV Push once  dextrose 50% Injectable 25 Gram(s) IV Push once  gabapentin 100 milliGRAM(s) Oral three times a day  insulin lispro (HumaLOG) corrective regimen sliding scale   SubCutaneous Before meals and at bedtime  pantoprazole    Tablet 40 milliGRAM(s) Oral two times a day  Torsemide 20  Imdur 60 mg po qd  FeSO4:  325 mg po  Novalog  Humalog    MEDICATIONS  (PRN):  acetaminophen   Tablet .. 650 milliGRAM(s) Oral every 6 hours PRN Temp greater or equal to 38C (100.4F), Mild Pain (1 - 3)  acetaminophen   Tablet .. 650 milliGRAM(s) Oral every 6 hours PRN Mild Pain (1 - 3), Moderate Pain (4 - 6)  dextrose 40% Gel 15 Gram(s) Oral once PRN Blood Glucose LESS THAN 70 milliGRAM(s)/deciliter  glucagon  Injectable 1 milliGRAM(s) IntraMuscular once PRN Glucose LESS THAN 70 milligrams/deciliter      Allergies  ACE inhibitors (Other (Mild))        SOCIAL HISTORY:No TOB, ETOH or IVDU    FAMILY HISTORY:  FH: type 2 diabetes: mom and dad      Vital Signs Last 24 Hrs  T(C): 36.4 (07 Aug 2019 16:43), Max: 37.1 (07 Aug 2019 05:00)  T(F): 97.6 (07 Aug 2019 16:43), Max: 98.7 (07 Aug 2019 05:00)  HR: 66 (07 Aug 2019 16:43) (62 - 84)  BP: 123/69 (07 Aug 2019 16:43) (103/62 - 162/80)  BP(mean): --  RR: 18 (07 Aug 2019 16:43) (18 - 18)  SpO2: 98% (07 Aug 2019 16:43) (90% - 98%)    PHYSICAL EXAM:    GENERAL: NAD, well-groomed, well-developed; obese.   HEAD:  Atraumatic, Normocephalic  EYES: EOMI, PERRLA, conjunctiva and sclera clear  ENMT: No tonsillar erythema, exudates, or enlargement; Moist mucous membranes, Good dentition, No lesions  NECK: Supple, No JVD, Normal thyroid  CHEST/LUNG: Clear to percussion bilaterally; No rales, rhonchi, wheezing, or rubs  HEART: Regular rate and rhythm; No murmurs, rubs, or gallops  ABDOMEN: Soft, Nontender, Nondistended; Bowel sounds present;  No scars or hernias.  no HSM.  no MTR.    WEI:  no lesions  No hemorrhoids.  Brown stool, + FOBT.   EXTREMITIES:  2+ Peripheral Pulses, No clubbing, cyanosis, or edema  LYMPH: No lymphadenopathy noted  SKIN: No rashes or lesions      LABS:                        8.7    6.03  )-----------( 243      ( 07 Aug 2019 12:37 )             26.1     08-    136  |  101  |  48.0<H>  ----------------------------<  152<H>  5.2   |  26.0  |  1.62<H>    Ca    8.3<L>      07 Aug 2019 12:37    TPro  7.1  /  Alb  3.4  /  TBili  0.6  /  DBili  x   /  AST  32<H>  /  ALT  19  /  AlkPhos  132<H>  08-06    PT/INR - ( 06 Aug 2019 16:03 )   PT: 15.6 sec;   INR: 1.34 ratio         PTT - ( 06 Aug 2019 16:03 )  PTT:28.3 sec       LIVER FUNCTIONS - ( 06 Aug 2019 14:55 )  Alb: 3.4 g/dL / Pro: 7.1 g/dL / ALK PHOS: 132 U/L / ALT: 19 U/L / AST: 32 U/L / GGT: x           FE:  335;  % SAT:  80.   [id number].     RADIOLOGY & ADDITIONAL STUDIES:  CXR:  Moderate right pleural effusion.

## 2019-08-07 NOTE — PHYSICAL THERAPY INITIAL EVALUATION ADULT - ADDITIONAL COMMENTS
Pt lives in a high [place] house with max 7 stairs at once with railing. She lives with her , daughters, and sisters who are able to help, but she reports there are times when she's alone. She was IPTA with SAC or RW. Pt reports ownership of SAC, RW, commode, shower chair.

## 2019-08-07 NOTE — PHYSICAL THERAPY INITIAL EVALUATION ADULT - CRITERIA FOR SKILLED THERAPEUTIC INTERVENTIONS
Pt is functionally independent and at her baseline. She needs no skilled PT while in house, PT will not follow.

## 2019-08-07 NOTE — CONSULT NOTE ADULT - ASSESSMENT
Anemia; + FOBT with 3rd admission in past 7 months for recurrent anemia.  1 prior colon polyp and 2 recent colonoscopy exams.  No other significant colon pathology.,  Prior EGD in 1/2019 was negative.  Recurrent anemia has occurred despite the deletion of Plavix.  R/o SB source for blood loss.    Suggest: Adequate transfusion of blood; DC Fe supplements.  GI office follow up for Capsule endoscopy next week.  No further inpatient GI evaluation needed.      Cirrhosis noted on recent CT scan, Unclear etiology.  Prior Hepatitis profiles negative.  Diabetic and Obese, likely etiologies.  Compensated cirrhosis.  Currrently normal platelets and coags.  No PHG on recent EGD in 1/2019.  Continue with periodic BW and Imaging.

## 2019-08-07 NOTE — DISCHARGE NOTE PROVIDER - NSDCCPCAREPLAN_GEN_ALL_CORE_FT
PRINCIPAL DISCHARGE DIAGNOSIS  Diagnosis: Symptomatic anemia  Assessment and Plan of Treatment: Follow up with your Primary Care Doctor to review your blood work regularly. Be sure to alert a medical professional immediately if you have symptoms of acute or worsening anemia including but not limited to the following: lightheadedness, dizziness, paleness, palpitations, shortness of breath, abnormal bleeding. Due to suspected GI bleed, Please do not take your aspirin or plavix medication until you follow up your cardiologist and gastroenterologist within 1 week after discharge. PRINCIPAL DISCHARGE DIAGNOSIS  Diagnosis: Symptomatic anemia  Assessment and Plan of Treatment: Follow up with your Primary Care Doctor to review your blood work regularly. Be sure to alert a medical professional immediately if you have symptoms of acute or worsening anemia including but not limited to the following: lightheadedness, dizziness, paleness, palpitations, shortness of breath, abnormal bleeding. Due to suspected GI bleed, Please do not take your aspirin or plavix medication until you follow up your cardiologist and gastroenterologist within 1 week after discharge.      SECONDARY DISCHARGE DIAGNOSES  Diagnosis: Pleural effusion on right  Assessment and Plan of Treatment: Please followup with CT surgery. PRINCIPAL DISCHARGE DIAGNOSIS  Diagnosis: Symptomatic anemia  Assessment and Plan of Treatment: Follow up with your Primary Care Doctor to review your blood work regularly. Be sure to alert a medical professional immediately if you have symptoms of acute or worsening anemia including but not limited to the following: lightheadedness, dizziness, paleness, palpitations, shortness of breath, abnormal bleeding. You can continue taking your aspirin and plavix daily. Please follow up with Gastroenterologist within  3 to 5 days for your capsule endoscopy. Please do not take your iron supplement until you follow up with the gastroenterologist.         SECONDARY DISCHARGE DIAGNOSES  Diagnosis: Pleural effusion on right  Assessment and Plan of Treatment: You have a pleural effusion, which means you have excess fluid buildup in the space between the layers of the pleura. You were asymptomatic. Deep breathing and coughing will decrease your risk for a lung infection. On imaging during this hospitalization it was shown to be the same size as previous imaging in the past. Call your doctor if you are coughing up blood, have chest pain, shortness of breath or a fever of 100.4. Please follow up with your PMD within 1 week after discharge. If you become short of breath or have difficulty breathing please follow up with your CT surgeon to evaluate for tap.

## 2019-08-07 NOTE — DISCHARGE NOTE PROVIDER - CARE PROVIDER_API CALL
Silverio Vasquez)  Gastroenterology; Internal Medicine  [address], [address]  [place] [address]  Phone: [phone]  Fax: [phone]  Follow Up Time: 1-3 days    Dr. Connors, PMD  Phone: (   )    -  Fax: (   )    -  Follow Up Time: Silverio Vasquez)  Gastroenterology; Internal Medicine  [address], [address]  [place] [address]  Phone: [phone]  Fax: [phone]  Follow Up Time: 1-3 days    Los Recio)  Cardiovascular Disease; Interventional Cardiology  [address], [address]  [place] [address]  Phone: [phone]  Fax: [phone]  Follow Up Time: 1 week    Dr. Connors, PMD  Phone: (   )    -  Fax: (   )    -  Follow Up Time:     Sal Escudero)  Surgery; Thoracic Surgery  [address] [place] [address]  Phone: [phone]  Fax: [phone]  Follow Up Time:

## 2019-08-07 NOTE — DISCHARGE NOTE PROVIDER - PROVIDER TOKENS
PROVIDER:[TOKEN:[[id number]],FOLLOWUP:[1-3 days]],FREE:[LAST:[Dr. Connors],FIRST:[PMD],PHONE:[(   )    -],FAX:[(   )    -]] PROVIDER:[TOKEN:[[id number]],FOLLOWUP:[1-3 days]],PROVIDER:[TOKEN:[[id number]],FOLLOWUP:[1 week]],FREE:[LAST:[Dr. Connors],FIRST:[PMD],PHONE:[(   )    -],FAX:[(   )    -]],PROVIDER:[TOKEN:[[id number]]]

## 2019-08-07 NOTE — DISCHARGE NOTE NURSING/CASE MANAGEMENT/SOCIAL WORK - NSDCDPATPORTLINK_GEN_ALL_CORE
You can access the [facility] Patient Portal, offered by [facility], by registering with the following website: [email or web address]

## 2019-08-09 ENCOUNTER — APPOINTMENT (OUTPATIENT)
Dept: CARDIOLOGY | Facility: CLINIC | Age: 64
End: 2019-08-09
Payer: MEDICARE

## 2019-08-09 PROCEDURE — 93264 REM MNTR WRLS P-ART PRS SNR: CPT

## 2019-08-18 ENCOUNTER — INPATIENT (INPATIENT)
Facility: HOSPITAL | Age: 64
LOS: 4 days | Discharge: HOME | DRG: 378 | End: 2019-08-23
Admitting: HOSPITALIST
Payer: MEDICARE

## 2019-08-18 VITALS
SYSTOLIC BLOOD PRESSURE: 106 MMHG | HEART RATE: 74 BPM | DIASTOLIC BLOOD PRESSURE: 65 MMHG | RESPIRATION RATE: 16 BRPM | WEIGHT: 225.09 LBS | TEMPERATURE: 98 F | OXYGEN SATURATION: 97 % | HEIGHT: 65 IN

## 2019-08-18 DIAGNOSIS — Z95.1 PRESENCE OF AORTOCORONARY BYPASS GRAFT: Chronic | ICD-10-CM

## 2019-08-18 DIAGNOSIS — K92.2 GASTROINTESTINAL HEMORRHAGE, UNSPECIFIED: ICD-10-CM

## 2019-08-18 DIAGNOSIS — Z98.890 OTHER SPECIFIED POSTPROCEDURAL STATES: Chronic | ICD-10-CM

## 2019-08-18 LAB
ALBUMIN SERPL ELPH-MCNC: 2.9 G/DL — LOW (ref 3.3–5.2)
ALP SERPL-CCNC: 82 U/L — SIGNIFICANT CHANGE UP (ref 40–120)
ALT FLD-CCNC: 15 U/L — SIGNIFICANT CHANGE UP
ANION GAP SERPL CALC-SCNC: 13 MMOL/L — SIGNIFICANT CHANGE UP (ref 5–17)
APTT BLD: 28.3 SEC — SIGNIFICANT CHANGE UP (ref 27.5–36.3)
AST SERPL-CCNC: 24 U/L — SIGNIFICANT CHANGE UP
BASOPHILS # BLD AUTO: 0.07 K/UL — SIGNIFICANT CHANGE UP (ref 0–0.2)
BASOPHILS NFR BLD AUTO: 1.3 % — SIGNIFICANT CHANGE UP (ref 0–2)
BILIRUB SERPL-MCNC: 0.5 MG/DL — SIGNIFICANT CHANGE UP (ref 0.4–2)
BUN SERPL-MCNC: 47 MG/DL — HIGH (ref 8–20)
CALCIUM SERPL-MCNC: 8.1 MG/DL — LOW (ref 8.6–10.2)
CHLORIDE SERPL-SCNC: 101 MMOL/L — SIGNIFICANT CHANGE UP (ref 98–107)
CO2 SERPL-SCNC: 26 MMOL/L — SIGNIFICANT CHANGE UP (ref 22–29)
CREAT SERPL-MCNC: 1.36 MG/DL — HIGH (ref 0.5–1.3)
EOSINOPHIL # BLD AUTO: 0.07 K/UL — SIGNIFICANT CHANGE UP (ref 0–0.5)
EOSINOPHIL NFR BLD AUTO: 1.3 % — SIGNIFICANT CHANGE UP (ref 0–6)
GLUCOSE SERPL-MCNC: 201 MG/DL — HIGH (ref 70–115)
HCT VFR BLD CALC: 15.1 % — CRITICAL LOW (ref 34.5–45)
HGB BLD-MCNC: 4.8 G/DL — CRITICAL LOW (ref 11.5–15.5)
INR BLD: 1.38 RATIO — HIGH (ref 0.88–1.16)
LYMPHOCYTES # BLD AUTO: 1.07 K/UL — SIGNIFICANT CHANGE UP (ref 1–3.3)
LYMPHOCYTES # BLD AUTO: 19.2 % — SIGNIFICANT CHANGE UP (ref 13–44)
MCHC RBC-ENTMCNC: 27.7 PG — SIGNIFICANT CHANGE UP (ref 27–34)
MCHC RBC-ENTMCNC: 31.8 GM/DL — LOW (ref 32–36)
MCV RBC AUTO: 87.3 FL — SIGNIFICANT CHANGE UP (ref 80–100)
MONOCYTES # BLD AUTO: 0.32 K/UL — SIGNIFICANT CHANGE UP (ref 0–0.9)
MONOCYTES NFR BLD AUTO: 5.7 % — SIGNIFICANT CHANGE UP (ref 2–14)
NEUTROPHILS # BLD AUTO: 4.05 K/UL — SIGNIFICANT CHANGE UP (ref 1.8–7.4)
NEUTROPHILS NFR BLD AUTO: 72.5 % — SIGNIFICANT CHANGE UP (ref 43–77)
PLATELET # BLD AUTO: 158 K/UL — SIGNIFICANT CHANGE UP (ref 150–400)
POTASSIUM SERPL-MCNC: 5.5 MMOL/L — HIGH (ref 3.5–5.3)
POTASSIUM SERPL-SCNC: 5.5 MMOL/L — HIGH (ref 3.5–5.3)
PROT SERPL-MCNC: 6.1 G/DL — LOW (ref 6.6–8.7)
PROTHROM AB SERPL-ACNC: 16 SEC — HIGH (ref 10–12.9)
RBC # BLD: 1.73 M/UL — LOW (ref 3.8–5.2)
RBC # FLD: 17.3 % — HIGH (ref 10.3–14.5)
SODIUM SERPL-SCNC: 140 MMOL/L — SIGNIFICANT CHANGE UP (ref 135–145)
WBC # BLD: 5.58 K/UL — SIGNIFICANT CHANGE UP (ref 3.8–10.5)
WBC # FLD AUTO: 5.58 K/UL — SIGNIFICANT CHANGE UP (ref 3.8–10.5)

## 2019-08-18 PROCEDURE — 99223 1ST HOSP IP/OBS HIGH 75: CPT

## 2019-08-18 PROCEDURE — 99222 1ST HOSP IP/OBS MODERATE 55: CPT

## 2019-08-18 PROCEDURE — 99291 CRITICAL CARE FIRST HOUR: CPT

## 2019-08-18 PROCEDURE — 93010 ELECTROCARDIOGRAM REPORT: CPT

## 2019-08-18 PROCEDURE — 74174 CTA ABD&PLVS W/CONTRAST: CPT | Mod: 26

## 2019-08-18 PROCEDURE — 71045 X-RAY EXAM CHEST 1 VIEW: CPT | Mod: 26

## 2019-08-18 RX ORDER — INSULIN LISPRO 100/ML
VIAL (ML) SUBCUTANEOUS
Refills: 0 | Status: DISCONTINUED | OUTPATIENT
Start: 2019-08-18 | End: 2019-08-23

## 2019-08-18 RX ORDER — PANTOPRAZOLE SODIUM 20 MG/1
40 TABLET, DELAYED RELEASE ORAL
Refills: 0 | Status: DISCONTINUED | OUTPATIENT
Start: 2019-08-18 | End: 2019-08-23

## 2019-08-18 RX ORDER — DULOXETINE HYDROCHLORIDE 30 MG/1
60 CAPSULE, DELAYED RELEASE ORAL DAILY
Refills: 0 | Status: DISCONTINUED | OUTPATIENT
Start: 2019-08-18 | End: 2019-08-23

## 2019-08-18 RX ORDER — GLUCAGON INJECTION, SOLUTION 0.5 MG/.1ML
1 INJECTION, SOLUTION SUBCUTANEOUS ONCE
Refills: 0 | Status: DISCONTINUED | OUTPATIENT
Start: 2019-08-18 | End: 2019-08-23

## 2019-08-18 RX ORDER — DEXTROSE 50 % IN WATER 50 %
15 SYRINGE (ML) INTRAVENOUS ONCE
Refills: 0 | Status: DISCONTINUED | OUTPATIENT
Start: 2019-08-18 | End: 2019-08-23

## 2019-08-18 RX ORDER — DEXTROSE 50 % IN WATER 50 %
25 SYRINGE (ML) INTRAVENOUS ONCE
Refills: 0 | Status: DISCONTINUED | OUTPATIENT
Start: 2019-08-18 | End: 2019-08-23

## 2019-08-18 RX ORDER — ISOSORBIDE MONONITRATE 60 MG/1
60 TABLET, EXTENDED RELEASE ORAL DAILY
Refills: 0 | Status: DISCONTINUED | OUTPATIENT
Start: 2019-08-18 | End: 2019-08-23

## 2019-08-18 RX ORDER — FUROSEMIDE 40 MG
40 TABLET ORAL ONCE
Refills: 0 | Status: DISCONTINUED | OUTPATIENT
Start: 2019-08-18 | End: 2019-08-18

## 2019-08-18 RX ORDER — DEXTROSE 50 % IN WATER 50 %
12.5 SYRINGE (ML) INTRAVENOUS ONCE
Refills: 0 | Status: DISCONTINUED | OUTPATIENT
Start: 2019-08-18 | End: 2019-08-23

## 2019-08-18 RX ORDER — ATORVASTATIN CALCIUM 80 MG/1
80 TABLET, FILM COATED ORAL AT BEDTIME
Refills: 0 | Status: DISCONTINUED | OUTPATIENT
Start: 2019-08-18 | End: 2019-08-23

## 2019-08-18 RX ORDER — CARVEDILOL PHOSPHATE 80 MG/1
6.25 CAPSULE, EXTENDED RELEASE ORAL EVERY 12 HOURS
Refills: 0 | Status: DISCONTINUED | OUTPATIENT
Start: 2019-08-18 | End: 2019-08-23

## 2019-08-18 RX ORDER — FUROSEMIDE 40 MG
40 TABLET ORAL ONCE
Refills: 0 | Status: COMPLETED | OUTPATIENT
Start: 2019-08-18 | End: 2019-08-18

## 2019-08-18 RX ORDER — SODIUM CHLORIDE 9 MG/ML
1000 INJECTION, SOLUTION INTRAVENOUS
Refills: 0 | Status: DISCONTINUED | OUTPATIENT
Start: 2019-08-18 | End: 2019-08-23

## 2019-08-18 RX ORDER — MORPHINE SULFATE 50 MG/1
2 CAPSULE, EXTENDED RELEASE ORAL ONCE
Refills: 0 | Status: DISCONTINUED | OUTPATIENT
Start: 2019-08-18 | End: 2019-08-18

## 2019-08-18 RX ADMIN — CARVEDILOL PHOSPHATE 6.25 MILLIGRAM(S): 80 CAPSULE, EXTENDED RELEASE ORAL at 18:47

## 2019-08-18 RX ADMIN — Medication 40 MILLIGRAM(S): at 18:00

## 2019-08-18 NOTE — H&P ADULT - NSHPPHYSICALEXAM_GEN_ALL_CORE
PHYSICAL EXAM:    GENERAL: NAD, well-developed, lethargic, sleepy but arousable , obese  HEAD:  Atraumatic, Normocephalic  EYES: EOMI, PERRLA, conjunctiva and sclera clear  ENMT: DRY mucous membranes  NECK: Supple, No JVD, Normal thyroid  NERVOUS SYSTEM:  Alert & Oriented X3, Good concentration  CHEST/LUNG: Clear to percussion bilaterally; No rales, rhonchi  HEART: Regular rate and rhythm; No murmurs  ABDOMEN: Soft, Nontender, Nondistended; Bowel sounds present  EXTREMITIES: , No clubbing, cyanosis, or edema. Lt sole tenderness near the heal   SKIN: No rashes or lesions

## 2019-08-18 NOTE — ED PROVIDER NOTE - CRITICAL CARE PROVIDED
interpretation of diagnostic studies/consultation with other physicians/documentation/additional history taking/direct patient care (not related to procedure)/consult w/ pt's family directly relating to pts condition

## 2019-08-18 NOTE — H&P ADULT - NSICDXPASTSURGICALHX_GEN_ALL_CORE_FT
PAST SURGICAL HISTORY:  S/P CABG (coronary artery bypass graft) PAST SURGICAL HISTORY:  H/O carotid endarterectomy     S/P CABG (coronary artery bypass graft)

## 2019-08-18 NOTE — CONSULT NOTE ADULT - PROBLEM SELECTOR RECOMMENDATION 9
possibly recurrant right sided diverticular bleed but ? if tics on right during prior colonoscopy. ? AVM. Also possibly small bowel bleeding. Suggest clear liquid diet, transfuse to Hgb of at least 8 and needs capsule endo as outpatient. Would consider repeat EGD after cardiac clearrance.

## 2019-08-18 NOTE — ED PROVIDER NOTE - PHYSICAL EXAMINATION
Constitutional - well-developed; well nourished. Head - NCAT. Airway patent. CV - RRR. no murmur. no edema. Pulm - CTAB. Abd - soft, nt. no rebound. no guarding. Neuro - A&Ox3. strength 5/5 x4. sensation intact x4. normal gait. Skin - No rash. MSK - normal ROM.   Rectal chaperone STEPHON Murray - gross hematocheziaon exam

## 2019-08-18 NOTE — H&P ADULT - HISTORY OF PRESENT ILLNESS
64yr Female with PMH of DM, HTN, CAD s/p CABG, MADHAVI x 2 , HFpEF, with rt pleural effusion, obesity presented to ER with recurrent Lower GI bleeding. She started having otoniel red blood per rectum since last 3 days,. Today she felt very weak and tired, hence sought medical care. In the ER< she was found to have Hgb 4, other vitals stable. Feels tired, denies any other symptoms at this time, last time she had a BM was this am. denies any nausea/vomiting or Abdominal pain. She was started on Blood transfusion, GI was consulted. and is being admitted for further care.   Of note, she has had similar presentations multiple times since Jan and has had EGD in Jan 2019 - negative for any abnormal findings, Colonscopy x 3 with one tubular adenoma removal  in Rt hepatic flexure in 5/2019. She was advised to stop her plavix per cardio and GI in previous admissions and follow up in GI office for capsule endoscopy, she had appointment to see GI tomorrow but had to present to ER due to ongoing bleeding.

## 2019-08-18 NOTE — H&P ADULT - ASSESSMENT
64yr Female with PMH of DM, HTN, CAD s/p CABG, MADHAVI x 2 , HFpEF, with rt pleural effusion, obesity presented to ER with recurrent Lower GI bleeding.    # Lower Gi bleeding with obscure cause - Possibly Diverticular bleed  known to GI service  Has had colonoscopies x 3 and EGD in jan 2019 - without any significant findings  Ct angio - negative for any active bleed  Discussed with Dr Carvalho - needs to follow up outpt for capsule endoscopy after blood transfusion  no need for IR consult as no active bleed seen on CT angio  consider Nuclear GI bleed scan in am   Liquid diet   ct pantoprazole  plavix has been discontinued  hold asa for now  admit to stepdown - Q2 vitals       # Acute blood loss anemia 2/2 lower gi bleed - Transfuse to keep Hgb around 8  recheck H&H post transfusion  and in am     # KEITH - baseline creatinine 0.9-1.1 - repeat BMP in am after volume resuscitation     # DM - takes Humulin and humalog at home - however is unsure of dosage of Humulin - Hulamlog 8-0-8units   SSI for now, CC diet   confirm dose on discharge    # CAD s/p CABG - asa on hold, continue coreg, imdur with holding parameters and lipitor    # HFpEF with chronic Rt pleural effusion - pt asymptomatic - per her - she has thoracentesis in 3/2019   hold diuretics po for now   Lasix in between transfusion - monitor I/O    # DVT px - IPCs for now - as pt active bleeding 64yr Female with PMH of DM, HTN, CAD s/p CABG, MADHAVI x 2 , HFpEF, with rt pleural effusion, obesity presented to ER with recurrent Lower GI bleeding.    # Lower Gi bleeding with obscure cause - Possibly Diverticular bleed  known to GI service  Has had colonoscopies x 3 and EGD in jan 2019 - without any significant findings  Ct angio - negative for any active bleed  Discussed with Dr Carvalho - needs to follow up outpt for capsule endoscopy after blood transfusion  no need for IR consult as no active bleed seen on CT angio  consider Nuclear GI bleed scan in am   Liquid diet   ct pantoprazole  plavix has been discontinued  hold asa for now  admit to stepdown - Q2 vitals       # Acute blood loss anemia 2/2 lower gi bleed - Transfuse to keep Hgb around 8  recheck H&H post transfusion  and in am     # KEITH - baseline creatinine 0.9-1.1 - repeat BMP in am after volume resuscitation     # DM - takes Humulin and humalog at home - however is unsure of dosage of Humulin - Hulamlog 8-0-8units   SSI for now, CC diet   confirm dose on discharge    # CAD s/p CABG - asa on hold, continue coreg, imdur with holding parameters and lipitor    # HFpEF with chronic Rt pleural effusion - pt asymptomatic - per her - she has thoracentesis in 3/2019   hold diuretics po for now   Lasix in between transfusion - monitor I/O    # DVT px - IPCs for now - as pt active bleeding         Medication reconciled from previous charts - will need to be reconfirmed hen patient able to provide information

## 2019-08-18 NOTE — H&P ADULT - NSICDXPASTMEDICALHX_GEN_ALL_CORE_FT
PAST MEDICAL HISTORY:  CAD in native artery     Chronic CHF     DM (diabetes mellitus), type 2     GERD (gastroesophageal reflux disease)     HTN (hypertension)     Hyperlipidemia PAST MEDICAL HISTORY:  CAD in native artery     Chronic CHF     DM (diabetes mellitus), type 2     GERD (gastroesophageal reflux disease)     HTN (hypertension)     Hyperlipidemia     Legally blind in right eye, as defined in [place]

## 2019-08-18 NOTE — ED ADULT NURSE NOTE - NSIMPLEMENTINTERV_GEN_ALL_ED
Implemented All Universal Safety Interventions:  [place] to call system. Call bell, personal items and telephone within reach. Instruct patient to call for assistance. Room bathroom lighting operational. Non-slip footwear when patient is off stretcher. Physically safe environment: no spills, clutter or unnecessary equipment. Stretcher in lowest position, wheels locked, appropriate side rails in place.

## 2019-08-18 NOTE — ED ADULT TRIAGE NOTE - NS ED NURSE BANDS TYPE
PA denied.  Will send denial forms for Nadia to complete.  Demi Hodges, [facility]  August 7, 2017 1:57 PM       Name band;

## 2019-08-18 NOTE — ED PROVIDER NOTE - OBJECTIVE STATEMENT
Pt is a 63 yo F co rectal bleeding.  Pt states that for the past 3 d she has noticed that she has been passing dark tarry stools.  Pt states that she has had several similar episodes in the past.  Pt states that this morning she woke up feeling very weak and unable to get up. no n/v. no abd pain. no fever/chills. no other complaints.

## 2019-08-18 NOTE — CONSULT NOTE ADULT - SUBJECTIVE AND OBJECTIVE BOX
Patient is a 64y old  Female who presents with a chief complaint of GI bleeding (18 Aug 2019 16:49)      HPI:  64yr Female with PMH of DM, HTN, CAD s/p CABG, MADHAVI x 2(last 2015) , HFpEF, with rt pleural effusion, obesity presented to ER with recurrent  GI bleeding. She started having very dark stools 3-4 days ago. Today she felt very weak and tired, hence sought medical care. In the ER< she was found to have Hgb 4, other vitals stable. Feels tired, denies any other symptoms at this time. last time she had a BM was this am. denies any nausea/vomiting or Abdominal pain. She was started on Blood transfusion, GI was consulted. and is being admitted for further care.   Of note, she has had similar presentations multiple times since Jan and has had EGD in Jan 2019 - negative for any abnormal findings except for some bulbar duodenitis and some gastritis in the antrum and body which would not have been responsible for bleeding. Colonscopy x 3, In January, February and then late May with one tubular adenoma removal  in Rt hepatic flexure in 5/2019. At the time of her last colonoscopy there was old blood scattered throughout the colon with left sided tics. An initial CTA in January or February showed active bleed in the area of the proximal transverse colon. She was advised to stop her plavix per cardio and GI in previous admissions and follow up in GI office for capsule endoscopy, she had appointment to see GI tomorrow but had to present to ER due to ongoing bleeding. (18 Aug 2019 16:49) Not on plavix and last in hospital August 7 for same reason and refused EGD and colonoscopy. CTA today negative for active GI bleed. Moderate right pleural effusion and mild to moderate cardiomegaly.      REVIEW OF SYSTEMS:    CONSTITUTIONAL: unable to ambulate today with marked weakness  EYES: No eye pain, visual disturbances, or discharge  ENMT:  No difficulty hearing, tinnitus, vertigo; No sinus or throat pain  NECK: No pain or stiffness  RESPIRATORY: No cough, wheezing, chills or hemoptysis; No shortness of breath  CARDIOVASCULAR: No chest pain, palpitations, dizziness, or leg swelling  GASTROINTESTINAL: as above  NEUROLOGICAL: No headaches, memory loss, loss of strength, numbness, or tremors  SKIN: No itching, burning, rashes, or lesions   LYMPH NODES: No enlarged glands  MUSCULOSKELETAL: No joint pain or swelling; No muscle, back, or extremity pain  PSYCHIATRIC: No depression, anxiety, mood swings, or difficulty sleeping  HEME/LYMPH: No easy bruising, or bleeding gums  ALLERY AND IMMUNOLOGIC: No hives or eczema      PAST MEDICAL & SURGICAL HISTORY:  CAD in native artery  Chronic CHF  DM (diabetes mellitus), type 2  GERD (gastroesophageal reflux disease)  Hyperlipidemia  HTN (hypertension)  S/P CABG (coronary artery bypass graft)      FAMILY HISTORY:      SOCIAL HISTORY:  Smoking Status: [ ] Current, [ ] Former, [ ] Never  Pack Years:  Alcohol Use:    Home Medications:  atorvastatin 80 mg oral tablet: 1 tab(s) orally once a day (18 Aug 2019 16:47)  Coreg 6.25 mg oral tablet: 1 tab(s) orally 2 times a day (18 Aug 2019 16:47)  ferrous sulfate 324 mg (65 mg elemental iron) oral tablet:  (18 Aug 2019 16:47)  gabapentin 100 mg oral capsule: 1 cap(s) orally 3 times a day (18 Aug 2019 16:47)  isosorbide mononitrate 60 mg oral tablet, extended release: 1 tab(s) orally once a day (in the morning) (18 Aug 2019 16:47)  pantoprazole 40 mg oral delayed release tablet: 1 tab(s) orally once a day (18 Aug 2019 16:47)  torsemide 20 mg oral tablet:  (18 Aug 2019 16:47)      MEDICATIONS:  MEDICATIONS  (STANDING):  atorvastatin 80 milliGRAM(s) Oral at bedtime  carvedilol 6.25 milliGRAM(s) Oral every 12 hours  dextrose 5%. 1000 milliLiter(s) (50 mL/Hr) IV Continuous <Continuous>  dextrose 50% Injectable 12.5 Gram(s) IV Push once  dextrose 50% Injectable 25 Gram(s) IV Push once  dextrose 50% Injectable 25 Gram(s) IV Push once  furosemide   Injectable 40 milliGRAM(s) IV Push once  insulin lispro (HumaLOG) corrective regimen sliding scale   SubCutaneous three times a day before meals  isosorbide   mononitrate ER Tablet (IMDUR) 60 milliGRAM(s) Oral daily  pantoprazole    Tablet 40 milliGRAM(s) Oral before breakfast    MEDICATIONS  (PRN):  dextrose 40% Gel 15 Gram(s) Oral once PRN Blood Glucose LESS THAN 70 milliGRAM(s)/deciliter  glucagon  Injectable 1 milliGRAM(s) IntraMuscular once PRN Glucose LESS THAN 70 milligrams/deciliter      Allergies    No Known Allergies    Intolerances        Vital Signs Last 24 Hrs  T(C): 36.9 (18 Aug 2019 10:55), Max: 36.9 (18 Aug 2019 10:55)  T(F): 98.5 (18 Aug 2019 10:55), Max: 98.5 (18 Aug 2019 10:55)  HR: 73 (18 Aug 2019 16:04) (73 - 74)  BP: 119/56 (18 Aug 2019 16:04) (106/65 - 119/56)  BP(mean): --  RR: 16 (18 Aug 2019 10:55) (16 - 16)  SpO2: 97% (18 Aug 2019 10:55) (97% - 97%)        PHYSICAL EXAM:    General: overweight female, well nourished; in no acute distress  HEENT: MMM, conjunctiva and sclera clear, right corneal opacifiaction, poor dentition  Lungs: Clear except for some basilar rales  Heart: Rhythm Regular, 2/6 systolc murmur at the base  Gastrointestinal: Soft, non-tender non-distended; Normal bowel sounds; No rebound or guarding; No Organomegaly & No Masses  Extremities: Normal range of motion, No clubbing, cyanosis or edema  Neurological: Alert and oriented x3, Non-focal  Skin: Warm and dry. No obvious rash  Rectal: No Masses, very soft dark brown stool with red blood admixed      LABS:                        4.8    5.58  )-----------( 158      ( 18 Aug 2019 12:06 )             15.1     08-18    140  |  101  |  47.0<H>  ----------------------------<  201<H>  5.5<H>   |  26.0  |  1.36<H>    Ca    8.1<L>      18 Aug 2019 12:06    TPro  6.1<L>  /  Alb  2.9<L>  /  TBili  0.5  /  DBili  x   /  AST  24  /  ALT  15  /  AlkPhos  82  08-18          RADIOLOGY & ADDITIONAL STUDIES:     < from: CT Angio Abdomen and Pelvis w/ IV Cont (08.18.19 @ 13:23) >   EXAM:  CT ANGIO ABD PELV (W)AW IC                          PROCEDURE DATE:  08/18/2019          INTERPRETATION:  CLINICAL INFORMATION: GI bleed    COMPARISON: None.    PROCEDURE:   CT of the Abdomen and Pelvis was performed with and without intravenous   contrast.  Precontrast, Arterial and Delayed phases were performed.  Intravenous contrast: 90 ml Omnipaque 350. 10 ml discarded.  Oral contrast: None.  Sagittal and coronal reformats were performed.    FINDINGS:    LOWER CHEST: Moderate right pleural effusion and probable secondary   compression atelectasis involving the right lower lobe. Coronary artery   calcification. Prior sternotomy. Mild to moderate cardiomegaly.    LIVER: Probable transient diminished density of the right hepatic lobe.   Nonspecific periportal edema.  BILE DUCTS: Normal caliber.  GALLBLADDER: Within normal limits.  SPLEEN: Within normal limits.  PANCREAS: Within normal limits.  ADRENALS: Within normal limits.  KIDNEYS/URETERS: 2 cm sized cyst lower left kidney. Probable renal   vascular calcifications bilaterally. Definite exclusion of a few punctate   nonobstructing right renal calculi cannot be made.    BLADDER: Within normal limits.  REPRODUCTIVE ORGANS: Unremarkable uterus. No adnexal mass.    BOWEL: No bowel obstruction. Appendix is normal. No arterial phase   extravasation of contrast or pooling of the contrast on delayed phase   imaging to suggest GI bleed.  PERITONEUM: No ascites.  VESSELS: Atherosclerotic changes.  RETROPERITONEUM/LYMPH NODES: No lymphadenopathy.    ABDOMINAL WALL: Haziness and stranding involving the subcutaneous soft   tissues indicating anasarca.  BONES: Within normal limits.    IMPRESSION:     No CT evidence of GI bleed at this time.    Right pleural effusion and probable anasarca.                    JEREMI VALIENTE M.D., ATTENDING RADIOLOGIST  This document has been electronically signed. Aug 18 2019  2:19PM                  < end of copied text >

## 2019-08-18 NOTE — ED ADULT TRIAGE NOTE - O2 FLOW (L/MIN)
Was the patient seen in the last year in this department? Yes    Does patient have an active prescription for medications requested? No     Received Request Via: Patient   2

## 2019-08-18 NOTE — ED ADULT TRIAGE NOTE - CHIEF COMPLAINT QUOTE
C/O black stools x 3 days. Abdomen soft, non tender, no distended. Denies CP or SOB. On home O2 PRN.

## 2019-08-18 NOTE — ED PROVIDER NOTE - CLINICAL SUMMARY MEDICAL DECISION MAKING FREE TEXT BOX
labs and imaging reviewed.  blood xfusion started.  Case d/w Dr. Valencia who will admit.  MARTHA paged for consult. labs and imaging reviewed.  blood xfusion started.  Case d/w Dr. Valencia who will admit.  Dr. Carvalho to see patient.

## 2019-08-19 ENCOUNTER — APPOINTMENT (OUTPATIENT)
Dept: GASTROENTEROLOGY | Facility: CLINIC | Age: 64
End: 2019-08-19

## 2019-08-19 ENCOUNTER — TRANSCRIPTION ENCOUNTER (OUTPATIENT)
Age: 64
End: 2019-08-19

## 2019-08-19 DIAGNOSIS — D50.0 IRON DEFICIENCY ANEMIA SECONDARY TO BLOOD LOSS (CHRONIC): ICD-10-CM

## 2019-08-19 DIAGNOSIS — I25.10 ATHEROSCLEROTIC HEART DISEASE OF NATIVE CORONARY ARTERY WITHOUT ANGINA PECTORIS: ICD-10-CM

## 2019-08-19 DIAGNOSIS — E78.5 HYPERLIPIDEMIA, UNSPECIFIED: ICD-10-CM

## 2019-08-19 DIAGNOSIS — I50.9 HEART FAILURE, UNSPECIFIED: ICD-10-CM

## 2019-08-19 DIAGNOSIS — E11.9 TYPE 2 DIABETES MELLITUS WITHOUT COMPLICATIONS: ICD-10-CM

## 2019-08-19 DIAGNOSIS — I10 ESSENTIAL (PRIMARY) HYPERTENSION: ICD-10-CM

## 2019-08-19 LAB
ANION GAP SERPL CALC-SCNC: 10 MMOL/L — SIGNIFICANT CHANGE UP (ref 5–17)
BUN SERPL-MCNC: 48 MG/DL — HIGH (ref 8–20)
CALCIUM SERPL-MCNC: 8.3 MG/DL — LOW (ref 8.6–10.2)
CHLORIDE SERPL-SCNC: 105 MMOL/L — SIGNIFICANT CHANGE UP (ref 98–107)
CO2 SERPL-SCNC: 28 MMOL/L — SIGNIFICANT CHANGE UP (ref 22–29)
CREAT SERPL-MCNC: 1.31 MG/DL — HIGH (ref 0.5–1.3)
GLUCOSE BLDC GLUCOMTR-MCNC: 164 MG/DL — HIGH (ref 70–99)
GLUCOSE BLDC GLUCOMTR-MCNC: 173 MG/DL — HIGH (ref 70–99)
GLUCOSE BLDC GLUCOMTR-MCNC: 340 MG/DL — HIGH (ref 70–99)
GLUCOSE BLDC GLUCOMTR-MCNC: 357 MG/DL — HIGH (ref 70–99)
GLUCOSE BLDC GLUCOMTR-MCNC: 374 MG/DL — HIGH (ref 70–99)
GLUCOSE SERPL-MCNC: 168 MG/DL — HIGH (ref 70–115)
HBA1C BLD-MCNC: 5.5 % — SIGNIFICANT CHANGE UP (ref 4–5.6)
HCT VFR BLD CALC: 22.1 % — LOW (ref 34.5–45)
HCT VFR BLD CALC: 23.4 % — LOW (ref 34.5–45)
HCV AB S/CO SERPL IA: 0.07 S/CO — SIGNIFICANT CHANGE UP (ref 0–0.99)
HCV AB SERPL-IMP: SIGNIFICANT CHANGE UP
HGB BLD-MCNC: 7.4 G/DL — LOW (ref 11.5–15.5)
HGB BLD-MCNC: 7.7 G/DL — LOW (ref 11.5–15.5)
INR BLD: 1.21 RATIO — HIGH (ref 0.88–1.16)
MAGNESIUM SERPL-MCNC: 2.5 MG/DL — SIGNIFICANT CHANGE UP (ref 1.6–2.6)
MCHC RBC-ENTMCNC: 28.5 PG — SIGNIFICANT CHANGE UP (ref 27–34)
MCHC RBC-ENTMCNC: 32.9 GM/DL — SIGNIFICANT CHANGE UP (ref 32–36)
MCV RBC AUTO: 86.7 FL — SIGNIFICANT CHANGE UP (ref 80–100)
PLATELET # BLD AUTO: 119 K/UL — LOW (ref 150–400)
POTASSIUM SERPL-MCNC: 4.7 MMOL/L — SIGNIFICANT CHANGE UP (ref 3.5–5.3)
POTASSIUM SERPL-SCNC: 4.7 MMOL/L — SIGNIFICANT CHANGE UP (ref 3.5–5.3)
PROTHROM AB SERPL-ACNC: 14 SEC — HIGH (ref 10–12.9)
RBC # BLD: 2.7 M/UL — LOW (ref 3.8–5.2)
RBC # FLD: 16.3 % — HIGH (ref 10.3–14.5)
SODIUM SERPL-SCNC: 143 MMOL/L — SIGNIFICANT CHANGE UP (ref 135–145)
WBC # BLD: 5.19 K/UL — SIGNIFICANT CHANGE UP (ref 3.8–10.5)
WBC # FLD AUTO: 5.19 K/UL — SIGNIFICANT CHANGE UP (ref 3.8–10.5)

## 2019-08-19 PROCEDURE — 99223 1ST HOSP IP/OBS HIGH 75: CPT

## 2019-08-19 PROCEDURE — 99233 SBSQ HOSP IP/OBS HIGH 50: CPT

## 2019-08-19 RX ORDER — OXYCODONE AND ACETAMINOPHEN 5; 325 MG/1; MG/1
2 TABLET ORAL EVERY 6 HOURS
Refills: 0 | Status: DISCONTINUED | OUTPATIENT
Start: 2019-08-19 | End: 2019-08-23

## 2019-08-19 RX ORDER — FUROSEMIDE 40 MG
40 TABLET ORAL ONCE
Refills: 0 | Status: COMPLETED | OUTPATIENT
Start: 2019-08-19 | End: 2019-08-19

## 2019-08-19 RX ADMIN — ATORVASTATIN CALCIUM 80 MILLIGRAM(S): 80 TABLET, FILM COATED ORAL at 01:53

## 2019-08-19 RX ADMIN — CARVEDILOL PHOSPHATE 6.25 MILLIGRAM(S): 80 CAPSULE, EXTENDED RELEASE ORAL at 16:40

## 2019-08-19 RX ADMIN — Medication 40 MILLIGRAM(S): at 13:55

## 2019-08-19 RX ADMIN — Medication 10: at 16:39

## 2019-08-19 RX ADMIN — OXYCODONE AND ACETAMINOPHEN 2 TABLET(S): 5; 325 TABLET ORAL at 16:39

## 2019-08-19 RX ADMIN — DULOXETINE HYDROCHLORIDE 60 MILLIGRAM(S): 30 CAPSULE, DELAYED RELEASE ORAL at 11:51

## 2019-08-19 RX ADMIN — ATORVASTATIN CALCIUM 80 MILLIGRAM(S): 80 TABLET, FILM COATED ORAL at 21:25

## 2019-08-19 RX ADMIN — Medication 2: at 11:51

## 2019-08-19 RX ADMIN — Medication 40 MILLIGRAM(S): at 16:39

## 2019-08-19 RX ADMIN — OXYCODONE AND ACETAMINOPHEN 2 TABLET(S): 5; 325 TABLET ORAL at 17:30

## 2019-08-19 RX ADMIN — ISOSORBIDE MONONITRATE 60 MILLIGRAM(S): 60 TABLET, EXTENDED RELEASE ORAL at 11:51

## 2019-08-19 NOTE — PATIENT PROFILE ADULT - NSPROPTRIGHTNOTIFY_GEN_A_NUR
Called patient to introduce lung navigator role with no answer. Will continue to attempt to reach patient.    declines

## 2019-08-19 NOTE — CONSULT NOTE ADULT - ASSESSMENT
A/P:  63 y/o F with a PMHx of hx of CAD, s/p CABG x1 (lima->LAD 5/2015), MADHAVI x2-> LCX & OM1 (8/2015), nuclear stress 11/2018 with mildly positive ischemia in RCA (medically managed), cardiac arrest due to hypercapnea, HFpEF (11/2018 EF 65-70%) s/p Cardiomems 05/2019, moderate RV dysfunction and severe pulmonary HTN, PAD s/p balloon angioplasty, KEITH/CKD, COPD, frequent GI bleeding, right pleural effusions s/p thoracentesis, DM2, HTN, and HLD who presents  to the ED with otoniel BRBPR. Patient was recently admitted for similar symptoms, and was planned to have a capsule study as an outpatient. Patient states that starting 4 days ago she began experiencing otoniel red blood per rectum on top of melena as well. Patient states that she became concerned and came to the ED, and was found to have a Hgb of 4.0 s/p PRBC transfusion. GI following, planning for possible endoscopy if cardiology provides clearance for the procedure. Patient states that she is having left sided neck pain and has baseline orthopnea and leg swelling, but denies fevers, chills, CP, SOB, palpitations, abdominal pain, N/V/D, headache, vision changes, or weakness.     1. Tati-operative Cardiac Risk Stratication  - RCRI risk 11%  - Please transfuse to Hgb >9.0 due to CAD and mild ischemia.   - Will give Lasix 40mg IV dose now, and please give additional Lasix 40mg IV between PRBCs   - Will interrogate Cardiomems  - Patient is at elevated risk for cardiac events tati-procedurally, but is on optimized cardiac regimen at this time, therefore no absolute cardiac contraindication to the planned surgical procedure.  - Please continue current cardiac regimen    2. HFpEF with RV dysfunction and Pulmonary HTN  - Will check CArdiomems (goal PA diastolic 20-28)  - Last reading 08/17 24 mmHg  - Lasix as above  - Continue Coreg and Imdur    3. CAD  - Hx of CAD with mild ischemia on stress testing  - ASA and plavix on hold due to GI bleeding, restart when cleared by GI  - Continue beta blockade, statin, and Imdur

## 2019-08-19 NOTE — PROGRESS NOTE ADULT - ASSESSMENT
64 yr old female with hypertension, hyperlipidemia, diabetes mellitus, HFpEF, CABG, obesity presented with complaints of recurrent lower GI bleeding. She was noted to have Hb 4, PRBC transfusions were given. She has had multiple admissions for the same, undergone EGD and colonoscopy, and was scheduled for a capsule endoscopy. GI was consulted. 64 yr old female with hypertension, hyperlipidemia, diabetes mellitus, HFpEF, CABG, obesity presented with complaints of recurrent lower GI bleeding. She was noted to have Hb 4, PRBC transfusions were given. She has had multiple admissions for the same, undergone EGD and colonoscopy, and was scheduled for a capsule endoscopy. GI was consulted, advised EGD pending cardiac clearance.

## 2019-08-19 NOTE — PROGRESS NOTE ADULT - SUBJECTIVE AND OBJECTIVE BOX
INTERVAL HPI/OVERNIGHT EVENTS:    CC:        Vital Signs Last 24 Hrs  T(C): 36.9 (19 Aug 2019 04:24), Max: 36.9 (18 Aug 2019 10:55)  T(F): 98.4 (19 Aug 2019 04:24), Max: 98.5 (18 Aug 2019 10:55)  HR: 69 (19 Aug 2019 04:24) (66 - 74)  BP: 127/57 (19 Aug 2019 04:24) (106/65 - 127/57)  BP(mean): --  RR: 18 (19 Aug 2019 04:24) (16 - 18)  SpO2: 100% (19 Aug 2019 04:24) (97% - 100%)    PHYSICAL EXAM:    GENERAL:   CHEST/LUNG:   HEART:   ABDOMEN:   EXTREMITIES:     MEDICATIONS  (STANDING):  atorvastatin 80 milliGRAM(s) Oral at bedtime  carvedilol 6.25 milliGRAM(s) Oral every 12 hours  dextrose 5%. 1000 milliLiter(s) (50 mL/Hr) IV Continuous <Continuous>  dextrose 50% Injectable 12.5 Gram(s) IV Push once  dextrose 50% Injectable 25 Gram(s) IV Push once  dextrose 50% Injectable 25 Gram(s) IV Push once  DULoxetine 60 milliGRAM(s) Oral daily  insulin lispro (HumaLOG) corrective regimen sliding scale   SubCutaneous three times a day before meals  isosorbide   mononitrate ER Tablet (IMDUR) 60 milliGRAM(s) Oral daily  pantoprazole    Tablet 40 milliGRAM(s) Oral before breakfast    MEDICATIONS  (PRN):  dextrose 40% Gel 15 Gram(s) Oral once PRN Blood Glucose LESS THAN 70 milliGRAM(s)/deciliter  glucagon  Injectable 1 milliGRAM(s) IntraMuscular once PRN Glucose LESS THAN 70 milligrams/deciliter      Allergies    No Known Allergies    Intolerances          LABS:                          7.4    x     )-----------( x        ( 19 Aug 2019 02:27 )             22.1     08-18    140  |  101  |  47.0<H>  ----------------------------<  201<H>  5.5<H>   |  26.0  |  1.36<H>    Ca    8.1<L>      18 Aug 2019 12:06    TPro  6.1<L>  /  Alb  2.9<L>  /  TBili  0.5  /  DBili  x   /  AST  24  /  ALT  15  /  AlkPhos  82  08-18    PT/INR - ( 18 Aug 2019 12:06 )   PT: 16.0 sec;   INR: 1.38 ratio         PTT - ( 18 Aug 2019 12:06 )  PTT:28.3 sec      RADIOLOGY & ADDITIONAL TESTS: INTERVAL HPI/OVERNIGHT EVENTS:    CC: symptomatic anemia, GI bleed, CABG, diabetes mellitus, hypertension, hyperlipidemia    Chart and course reviewed. No overnight events, chronic pain, denies chest pain, shortness of breath.        Vital Signs Last 24 Hrs  T(C): 36.9 (19 Aug 2019 04:24), Max: 36.9 (18 Aug 2019 10:55)  T(F): 98.4 (19 Aug 2019 04:24), Max: 98.5 (18 Aug 2019 10:55)  HR: 69 (19 Aug 2019 04:24) (66 - 74)  BP: 127/57 (19 Aug 2019 04:24) (106/65 - 127/57)  BP(mean): --  RR: 18 (19 Aug 2019 04:24) (16 - 18)  SpO2: 100% (19 Aug 2019 04:24) (97% - 100%)    PHYSICAL EXAM:    GENERAL: alert, not in distress  CHEST/LUNG: b/l air entry, clear  HEART: regular  ABDOMEN: soft, BS+  EXTREMITIES: no edema, tenderness.    MEDICATIONS  (STANDING):  atorvastatin 80 milliGRAM(s) Oral at bedtime  carvedilol 6.25 milliGRAM(s) Oral every 12 hours  dextrose 5%. 1000 milliLiter(s) (50 mL/Hr) IV Continuous <Continuous>  dextrose 50% Injectable 12.5 Gram(s) IV Push once  dextrose 50% Injectable 25 Gram(s) IV Push once  dextrose 50% Injectable 25 Gram(s) IV Push once  DULoxetine 60 milliGRAM(s) Oral daily  insulin lispro (HumaLOG) corrective regimen sliding scale   SubCutaneous three times a day before meals  isosorbide   mononitrate ER Tablet (IMDUR) 60 milliGRAM(s) Oral daily  pantoprazole    Tablet 40 milliGRAM(s) Oral before breakfast    MEDICATIONS  (PRN):  dextrose 40% Gel 15 Gram(s) Oral once PRN Blood Glucose LESS THAN 70 milliGRAM(s)/deciliter  glucagon  Injectable 1 milliGRAM(s) IntraMuscular once PRN Glucose LESS THAN 70 milligrams/deciliter      Allergies    No Known Allergies    Intolerances          LABS:                          7.4    x     )-----------( x        ( 19 Aug 2019 02:27 )             22.1     08-18    140  |  101  |  47.0<H>  ----------------------------<  201<H>  5.5<H>   |  26.0  |  1.36<H>    Ca    8.1<L>      18 Aug 2019 12:06    TPro  6.1<L>  /  Alb  2.9<L>  /  TBili  0.5  /  DBili  x   /  AST  24  /  ALT  15  /  AlkPhos  82  08-18    PT/INR - ( 18 Aug 2019 12:06 )   PT: 16.0 sec;   INR: 1.38 ratio         PTT - ( 18 Aug 2019 12:06 )  PTT:28.3 sec      RADIOLOGY & ADDITIONAL TESTS:

## 2019-08-19 NOTE — CONSULT NOTE ADULT - ATTENDING COMMENTS
congestive heart failure and anemia . for pre-operative cardiovascular risk evaluation and management   Need Hb > 9 to avoid any ischemia.   Diursis. Cardiomem check showed 63/27    need more IV diuresis. ct PO diuresis.   ok for EGD.  benefits outweigh the risk. discussed with patient.

## 2019-08-19 NOTE — PROGRESS NOTE ADULT - ASSESSMENT
In short, this is a 64-year-old woman with obscure GI bleeding.  She has undergone evaluation with EGD and colonoscopy in the recent past.  An EGD in Jan 2019 was negative for any abnormal findings save bulbar duodenitis and gastritis in the antrum and body,  which would not have been responsible for bleeding.  She has undergone colonoscopy on three occasions this year - January, February and then late May with one tubular adenoma resection.  At the time of her last colonoscopy there was old blood scattered throughout the colon with left sided diverticula.  A CTA from early this year, showed active bleed in the area of the proximal transverse colon.  She was advised to stop clopidogrel per cardiology and GI in previous admissions and follow up in GI office for capsule endoscopy, for which she had appointment on 8/19/2019.  Her CTA from presentation was negative for any bleeding but revealed a moderate right pleural effusion and mild to moderate cardiomegaly.    - In short, this is a 64-year-old woman with obscure GI bleeding.  She has undergone evaluation with EGD and colonoscopy in the recent past.  An EGD in Jan 2019 was negative for any abnormal findings save bulbar duodenitis and gastritis in the antrum and body,  which would not have been responsible for bleeding.  She has undergone colonoscopy on three occasions this year - January, February and then late May with one tubular adenoma resection.  At the time of her last colonoscopy there was old blood scattered throughout the colon with left sided diverticula.  A CTA from early this year, showed active bleed in the area of the proximal transverse colon.  She was advised to stop clopidogrel per cardiology and GI in previous admissions and follow up in GI office for capsule endoscopy, for which she had appointment on 8/19/2019.  Her CTA from presentation was negative for any bleeding but revealed a moderate right pleural effusion and mild to moderate cardiomegaly.    Overall, after the patient has been evaluated by cardiology and has been optimized from a cardiopulmonary standpoint, we can consider repeating an EGD.  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    - OK for patient to have clear liquids - no plan for EGD today  - Please have cardiology evaluate patient for possible EGD  - Continue PPI  - Monitor CBC closely and maintain hemoglobin >8 gm/dL    Will continue to follow.  Thank you for the consult.  Please do not hesitate to call with any questions or concerns.    RABIA Thompson MD  [facility]  Division of Gastroenterology  Tel [phone]  Fax [phone]  08-19-19 @ 09:41

## 2019-08-19 NOTE — CONSULT NOTE ADULT - SUBJECTIVE AND OBJECTIVE BOX
Consult requested by:  Dr. Veliz    Reason for Consultation: Tati-operative Cardiac Risk Stratification     History obtained by: Patient and medical record     obtained: No    Chief complaint:  "I've been bleeding."    HPI: 65 y/o F with a PMHx of hx of CAD, s/p CABG x1 (lima->LAD 5/2015), MADHAVI x2-> LCX & OM1 (8/2015), nuclear stress 11/2018 with mildly positive ischemia in RCA (medically managed), cardiac arrest due to hypercapnea, HFpEF (11/2018 EF 65-70%) s/p Cardiomems 05/2019, moderate RV dysfunction and severe pulmonary HTN, PAD s/p balloon angioplasty, KEITH/CKD, COPD, frequent GI bleeding, right pleural effusions s/p thoracentesis, DM2, HTN, and HLD who presents  to the ED with otoniel BRBPR.       64yr Female with PMH of DM, HTN, CAD s/p CABG, MADHAVI x 2 , HFpEF, with rt pleural effusion, obesity presented to ER with recurrent Lower GI bleeding. She started having otoniel red blood per rectum since last 3 days,. Today she felt very weak and tired, hence sought medical care. In the ER< she was found to have Hgb 4, other vitals stable. Feels tired, denies any other symptoms at this time, last time she had a BM was this am. denies any nausea/vomiting or Abdominal pain. She was started on Blood transfusion, GI was consulted. and is being admitted for further care.   Of note, she has had similar presentations multiple times since Jan and has had EGD in Jan 2019 - negative for any abnormal findings, Colonscopy x 3 with one tubular adenoma removal  in Rt hepatic flexure in 5/2019. She was advised to stop her plavix per cardio and GI in previous admissions and follow up in GI office for capsule endoscopy, she had appointment to see GI tomorrow but had to present to ER due to ongoing bleeding. (18 Aug 2019 16:49)        REVIEW OF SYMPTOMS: Cardiovascular:     chest pain,  dyspnea,  syncope,    palpitaitons,      dizziness,    Orthopnea,      Paroxsymal nocturnal dyspnea;  Respiratory:    Dyspnea,   cough,   ;   Genitourinary:  No dysuria, no hematuria; Gastrointestinal:   No dark color stool, no melena, no diarrhea, no constipation, no abdominal pain; Neurological: No headache, no dizziness, no slurred speech;  Psychiatric: No agitation, no anxiety.  ALL OTHER REVIEW OF SYSTEMS ARE NEGATIVE.    MEDICATIONS  (STANDING):  atorvastatin 80 milliGRAM(s) Oral at bedtime  carvedilol 6.25 milliGRAM(s) Oral every 12 hours  dextrose 5%. 1000 milliLiter(s) (50 mL/Hr) IV Continuous <Continuous>  dextrose 50% Injectable 12.5 Gram(s) IV Push once  dextrose 50% Injectable 25 Gram(s) IV Push once  dextrose 50% Injectable 25 Gram(s) IV Push once  DULoxetine 60 milliGRAM(s) Oral daily  insulin lispro (HumaLOG) corrective regimen sliding scale   SubCutaneous three times a day before meals  isosorbide   mononitrate ER Tablet (IMDUR) 60 milliGRAM(s) Oral daily  pantoprazole    Tablet 40 milliGRAM(s) Oral before breakfast    MEDICATIONS  (PRN):  dextrose 40% Gel 15 Gram(s) Oral once PRN Blood Glucose LESS THAN 70 milliGRAM(s)/deciliter  glucagon  Injectable 1 milliGRAM(s) IntraMuscular once PRN Glucose LESS THAN 70 milligrams/deciliter        PAST MEDICAL & SURGICAL HISTORY:  Legally blind in right eye, as defined in [place]  CAD in native artery  Chronic CHF  DM (diabetes mellitus), type 2  GERD (gastroesophageal reflux disease)  Hyperlipidemia  HTN (hypertension)  H/O carotid endarterectomy  S/P CABG (coronary artery bypass graft)      FAMILY HISTORY:  No pertinent family history in first degree relatives      SOCIAL HISTORY:    CIGARETTES:       ALCOHOL:    DRUGS:    Vital Signs Last 24 Hrs  T(C): 36.7 (19 Aug 2019 11:40), Max: 36.9 (19 Aug 2019 04:24)  T(F): 98 (19 Aug 2019 11:40), Max: 98.4 (19 Aug 2019 04:24)  HR: 67 (19 Aug 2019 11:40) (66 - 73)  BP: 112/57 (19 Aug 2019 11:40) (112/57 - 127/57)  BP(mean): --  RR: 18 (19 Aug 2019 11:40) (18 - 21)  SpO2: 100% (19 Aug 2019 11:40) (100% - 100%)      PHYSICAL EXAM:  Appearance: Normal, well nourished	  HEENT:   Normal oral mucosa, PERRL, EOMI, sclera non-icteric	  Lymphatic: No cervical lymphadenopathy  Cardiovascular: Normal S1 S2, No JVD, No cardiac murmurs, No carotid bruits, No peripheral edema  Respiratory: Lungs clear to auscultation	  Psychiatry: A & O x 3, Mood & affect appropriate  Gastrointestinal:  Soft, Non-tender, + BS, no bruits	  Skin: No rashes, No ecchymoses, No cyanosis  Neurologic: Grossly non-focal with full strength in all four extremities  Extremities: Normal range of motion, No clubbing, cyanosis or edema  Vascular: Peripheral pulses palpable 2+ bilaterally  INTERPRETATION OF TELEMETRY:    ECG:    I&O's Detail      LABS:                        7.7    5.19  )-----------( 119      ( 19 Aug 2019 08:33 )             23.4     08-19    143  |  105  |  48.0<H>  ----------------------------<  168<H>  4.7   |  28.0  |  1.31<H>    Ca    8.3<L>      19 Aug 2019 08:33  Mg     2.5     08-19    TPro  6.1<L>  /  Alb  2.9<L>  /  TBili  0.5  /  DBili  x   /  AST  24  /  ALT  15  /  AlkPhos  82  08-18        PT/INR - ( 19 Aug 2019 08:33 )   PT: 14.0 sec;   INR: 1.21 ratio         PTT - ( 18 Aug 2019 12:06 )  PTT:28.3 sec    I&O's Summary      RADIOLOGY & ADDITIONAL STUDIES:  X-ray:    < from: Xray Chest 1 View- PORTABLE-Urgent (08.18.19 @ 11:47) >  EXAM:  XR CHEST PORTABLE URGENT 1V                          PROCEDURE DATE:  08/18/2019          INTERPRETATION:  Portable chest radiograph        CLINICAL INFORMATION:   Weakness. Assess for pneumonia.    TECHNIQUE:  Portable  AP view of the chest was obtained.    COMPARISON: No previous examinations are available for review.    FINDINGS:   The lungs  show RIGHT lower lobe airspace consolidation and moderate   RIGHT effusion. LEFT lung clear.    The  heart is enlarged in transverse diameter. No hilar mass. Trachea   midline.  Status post coronary artery bypass graft procedure.   Visualized osseous structures are intact.        IMPRESSION:   Cardiomegaly. Moderate RIGHT effusion and/or RIGHT lower   lobe atelectasis/.    < end of copied text >    PREVIOUS DIAGNOSTIC TESTING:      ECHO  FINDINGS:  TTE 02/2019: EF >75%, hyperdynamic LV, mild concentril LVH, mild MAC, mod MR, mod-severe TR, PA systolic 55.7, mod pulm HTN    STRESS    FINDINGS:  Nuclear Stress 11/2018 with mild RCA ischemia Consult requested by:  Dr. Veliz    Reason for Consultation: Tati-operative Cardiac Risk Stratification     History obtained by: Patient and medical record     obtained: No    Chief complaint:  "I've been bleeding."    HPI: 63 y/o F with a PMHx of hx of CAD, s/p CABG x1 (lima->LAD 5/2015), MADHAVI x2-> LCX & OM1 (8/2015), nuclear stress 11/2018 with mildly positive ischemia in RCA (medically managed), cardiac arrest due to hypercapnea, HFpEF (11/2018 EF 65-70%) s/p Cardiomems 05/2019, moderate RV dysfunction and severe pulmonary HTN, PAD s/p balloon angioplasty, KEITH/CKD, COPD, frequent GI bleeding, right pleural effusions s/p thoracentesis, DM2, HTN, and HLD who presents  to the ED with otoniel BRBPR. Patient was recently admitted for similar symptoms, and was planned to have a capsule study as an outpatient. Patient states that starting 4 days ago she began experiencing otoniel red blood per rectum on top of melena as well. Patient states that she became concerned and came to the ED, and was found to have a Hgb of 4.0 s/p PRBC transfusion. GI following, planning for possible endoscopy if cardiology provides clearance for the procedure. Patient states that she is having left sided neck pain and has baseline orthopnea and leg swelling, but denies fevers, chills, CP, SOB, palpitations, abdominal pain, N/V/D, headache, vision changes, or weakness.     REVIEW OF SYMPTOMS:   Cardiovascular:  AS PER HPI  Respiratory: AS PER HPI  Genitourinary:  No dysuria, no hematuria;   Gastrointestinal:   AS PER HPI  Neurological: No headache, no dizziness, no slurred speech;    Psychiatric: No agitation, no anxiety.    ALL OTHER REVIEW OF SYSTEMS ARE NEGATIVE.    MEDICATIONS  (STANDING):  atorvastatin 80 milliGRAM(s) Oral at bedtime  carvedilol 6.25 milliGRAM(s) Oral every 12 hours  dextrose 5%. 1000 milliLiter(s) (50 mL/Hr) IV Continuous <Continuous>  dextrose 50% Injectable 12.5 Gram(s) IV Push once  dextrose 50% Injectable 25 Gram(s) IV Push once  dextrose 50% Injectable 25 Gram(s) IV Push once  DULoxetine 60 milliGRAM(s) Oral daily  insulin lispro (HumaLOG) corrective regimen sliding scale   SubCutaneous three times a day before meals  isosorbide   mononitrate ER Tablet (IMDUR) 60 milliGRAM(s) Oral daily  pantoprazole    Tablet 40 milliGRAM(s) Oral before breakfast    MEDICATIONS  (PRN):  dextrose 40% Gel 15 Gram(s) Oral once PRN Blood Glucose LESS THAN 70 milliGRAM(s)/deciliter  glucagon  Injectable 1 milliGRAM(s) IntraMuscular once PRN Glucose LESS THAN 70 milligrams/deciliter      Home Medications:  atorvastatin 80 mg oral tablet: 1 tab(s) orally once a day (18 Aug 2019 16:47)  Coreg 6.25 mg oral tablet: 1 tab(s) orally 2 times a day (18 Aug 2019 16:47)  Cymbalta 60 mg oral delayed release capsule: 1 cap(s) orally once a day (18 Aug 2019 17:14)  ferrous sulfate 324 mg (65 mg elemental iron) oral tablet:  (18 Aug 2019 16:47)  gabapentin 100 mg oral capsule: 1 cap(s) orally 3 times a day (18 Aug 2019 16:47)  HumaLOG KwikPen 100 units/mL injectable solution: 8 unit(s) injectable 3 times a day with meals (18 Aug 2019 17:13)  HumuLIN N Pen 100 units/mL subcutaneous suspension: 15 unit(s) subcutaneous once a day (at bedtime) (18 Aug 2019 17:13)  isosorbide mononitrate 60 mg oral tablet, extended release: 1 tab(s) orally once a day (in the morning) (18 Aug 2019 16:47)  losartan 25 mg oral tablet: 1 tab(s) orally once a day (18 Aug 2019 17:14)  pantoprazole 40 mg oral delayed release tablet: 1 tab(s) orally once a day (18 Aug 2019 16:47)  torsemide 20 mg oral tablet:  (18 Aug 2019 16:47)    PAST MEDICAL & SURGICAL HISTORY:  Legally blind in right [place], as defined in [place]  CAD in native artery  Chronic CHF  DM (diabetes mellitus), type 2  GERD (gastroesophageal reflux disease)  Hyperlipidemia  HTN (hypertension)  H/O carotid endarterectomy  S/P CABG (coronary artery bypass graft)      FAMILY HISTORY:  No pertinent family history in first degree relatives      SOCIAL HISTORY:    CIGARETTES:       ALCOHOL:    DRUGS:    Vital Signs Last 24 Hrs  T(C): 36.7 (19 Aug 2019 11:40), Max: 36.9 (19 Aug 2019 04:24)  T(F): 98 (19 Aug 2019 11:40), Max: 98.4 (19 Aug 2019 04:24)  HR: 67 (19 Aug 2019 11:40) (66 - 73)  BP: 112/57 (19 Aug 2019 11:40) (112/57 - 127/57)  BP(mean): --  RR: 18 (19 Aug 2019 11:40) (18 - 21)  SpO2: 100% (19 Aug 2019 11:40) (100% - 100%)      PHYSICAL EXAM:  Appearance: Normal, well nourished	  HEENT:   Normal oral mucosa, PERRL, EOMI, sclera non-icteric	  Lymphatic: No cervical lymphadenopathy  Cardiovascular: Normal S1 S2, No JVD, No cardiac murmurs, No carotid bruits, No peripheral edema  Respiratory: Lungs clear to auscultation	  Psychiatry: A & O x 3, Mood & affect appropriate  Gastrointestinal:  Soft, Non-tender, + BS, no bruits	  Skin: No rashes, No ecchymoses, No cyanosis  Neurologic: Grossly non-focal with full strength in all four extremities  Extremities: Normal range of motion, No clubbing, cyanosis or edema  Vascular: Peripheral pulses palpable 2+ bilaterally  INTERPRETATION OF TELEMETRY:    ECG:    I&O's Detail      LABS:                        7.7    5.19  )-----------( 119      ( 19 Aug 2019 08:33 )             23.4     08-19    143  |  105  |  48.0<H>  ----------------------------<  168<H>  4.7   |  28.0  |  1.31<H>    Ca    8.3<L>      19 Aug 2019 08:33  Mg     2.5     08-19    TPro  6.1<L>  /  Alb  2.9<L>  /  TBili  0.5  /  DBili  x   /  AST  24  /  ALT  15  /  AlkPhos  82  08-18        PT/INR - ( 19 Aug 2019 08:33 )   PT: 14.0 sec;   INR: 1.21 ratio         PTT - ( 18 Aug 2019 12:06 )  PTT:28.3 sec    I&O's Summary      RADIOLOGY & ADDITIONAL STUDIES:  X-ray:    < from: Xray Chest 1 View- PORTABLE-Urgent (08.18.19 @ 11:47) >  EXAM:  XR CHEST PORTABLE URGENT 1V                          PROCEDURE DATE:  08/18/2019          INTERPRETATION:  Portable chest radiograph        CLINICAL INFORMATION:   Weakness. Assess for pneumonia.    TECHNIQUE:  Portable  AP view of the chest was obtained.    COMPARISON: No previous examinations are available for review.    FINDINGS:   The lungs  show RIGHT lower lobe airspace consolidation and moderate   RIGHT effusion. LEFT lung clear.    The  heart is enlarged in transverse diameter. No hilar mass. Trachea   midline.  Status post coronary artery bypass graft procedure.   Visualized osseous structures are intact.        IMPRESSION:   Cardiomegaly. Moderate RIGHT effusion and/or RIGHT lower   lobe atelectasis/.    < end of copied text >    PREVIOUS DIAGNOSTIC TESTING:      ECHO  FINDINGS:  TTE 02/2019: EF >75%, hyperdynamic LV, mild concentril LVH, mild MAC, mod MR, mod-severe TR, PA systolic 55.7, mod pulm HTN    STRESS    FINDINGS:  Nuclear Stress 11/2018 with mild RCA ischemia Consult requested by:  Dr. Veliz    Reason for Consultation: Tati-operative Cardiac Risk Stratification     History obtained by: Patient and medical record     obtained: No    Chief complaint:  "I've been bleeding."    HPI: 63 y/o F with a PMHx of hx of CAD, s/p CABG x1 (lima->LAD 5/2015), MADHAVI x2-> LCX & OM1 (8/2015), nuclear stress 11/2018 with mildly positive ischemia in RCA (medically managed), cardiac arrest due to hypercapnea, HFpEF (11/2018 EF 65-70%) s/p Cardiomems 05/2019, moderate RV dysfunction and severe pulmonary HTN, PAD s/p balloon angioplasty, KEITH/CKD, COPD, frequent GI bleeding, right pleural effusions s/p thoracentesis, DM2, HTN, and HLD who presents  to the ED with otoniel BRBPR. Patient was recently admitted for similar symptoms, and was planned to have a capsule study as an outpatient. Patient states that starting 4 days ago she began experiencing otoniel red blood per rectum on top of melena as well. Patient states that she became concerned and came to the ED, and was found to have a Hgb of 4.0 s/p PRBC transfusion. GI following, planning for possible endoscopy if cardiology provides clearance for the procedure. Patient states that she is having left sided neck pain and has baseline orthopnea and leg swelling, but denies fevers, chills, CP, SOB, palpitations, abdominal pain, N/V/D, headache, vision changes, or weakness.     REVIEW OF SYMPTOMS:   Cardiovascular:  AS PER HPI  Respiratory: AS PER HPI  Genitourinary:  No dysuria, no hematuria;   Gastrointestinal:   AS PER HPI  Neurological: No headache, no dizziness, no slurred speech;    Psychiatric: No agitation, no anxiety.    ALL OTHER REVIEW OF SYSTEMS ARE NEGATIVE.    MEDICATIONS  (STANDING):  atorvastatin 80 milliGRAM(s) Oral at bedtime  carvedilol 6.25 milliGRAM(s) Oral every 12 hours  dextrose 5%. 1000 milliLiter(s) (50 mL/Hr) IV Continuous <Continuous>  dextrose 50% Injectable 12.5 Gram(s) IV Push once  dextrose 50% Injectable 25 Gram(s) IV Push once  dextrose 50% Injectable 25 Gram(s) IV Push once  DULoxetine 60 milliGRAM(s) Oral daily  insulin lispro (HumaLOG) corrective regimen sliding scale   SubCutaneous three times a day before meals  isosorbide   mononitrate ER Tablet (IMDUR) 60 milliGRAM(s) Oral daily  pantoprazole    Tablet 40 milliGRAM(s) Oral before breakfast    MEDICATIONS  (PRN):  dextrose 40% Gel 15 Gram(s) Oral once PRN Blood Glucose LESS THAN 70 milliGRAM(s)/deciliter  glucagon  Injectable 1 milliGRAM(s) IntraMuscular once PRN Glucose LESS THAN 70 milligrams/deciliter      Home Medications:  atorvastatin 80 mg oral tablet: 1 tab(s) orally once a day (18 Aug 2019 16:47)  Coreg 6.25 mg oral tablet: 1 tab(s) orally 2 times a day (18 Aug 2019 16:47)  Cymbalta 60 mg oral delayed release capsule: 1 cap(s) orally once a day (18 Aug 2019 17:14)  ferrous sulfate 324 mg (65 mg elemental iron) oral tablet:  (18 Aug 2019 16:47)  gabapentin 100 mg oral capsule: 1 cap(s) orally 3 times a day (18 Aug 2019 16:47)  HumaLOG KwikPen 100 units/mL injectable solution: 8 unit(s) injectable 3 times a day with meals (18 Aug 2019 17:13)  HumuLIN N Pen 100 units/mL subcutaneous suspension: 15 unit(s) subcutaneous once a day (at bedtime) (18 Aug 2019 17:13)  isosorbide mononitrate 60 mg oral tablet, extended release: 1 tab(s) orally once a day (in the morning) (18 Aug 2019 16:47)  losartan 25 mg oral tablet: 1 tab(s) orally once a day (18 Aug 2019 17:14)  pantoprazole 40 mg oral delayed release tablet: 1 tab(s) orally once a day (18 Aug 2019 16:47)  torsemide 20 mg oral tablet:  (18 Aug 2019 16:47)    PAST MEDICAL & SURGICAL HISTORY:  Legally blind in right eye, as defined in [place]  CAD in native artery  Chronic CHF  DM (diabetes mellitus), type 2  GERD (gastroesophageal reflux disease)  Hyperlipidemia  HTN (hypertension)  H/O carotid endarterectomy  S/P CABG (coronary artery bypass graft)      FAMILY HISTORY:  No pertinent family history in first degree relatives      SOCIAL HISTORY: Lives with family    CIGARETTES:   Never smoker    ALCOHOL: Denies    DRUGS: Denies    Vital Signs Last 24 Hrs  T(C): 36.7 (19 Aug 2019 11:40), Max: 36.9 (19 Aug 2019 04:24)  T(F): 98 (19 Aug 2019 11:40), Max: 98.4 (19 Aug 2019 04:24)  HR: 67 (19 Aug 2019 11:40) (66 - 73)  BP: 112/57 (19 Aug 2019 11:40) (112/57 - 127/57)  RR: 18 (19 Aug 2019 11:40) (18 - 21)  SpO2: 100% (19 Aug 2019 11:40) (100% - 100%)      PHYSICAL EXAM:  Appearance: Normal, well nourished	  HEENT:   Normal oral mucosa, PERRL, EOMI, sclera non-icteric	  Lymphatic: No cervical lymphadenopathy  Cardiovascular: Normal S1 S2, + JVD, III/VI systolic murmur lsb, No carotid bruits, 1+ peripheral edema  Respiratory: Decreased breath sounds on the right side   Psychiatry: A & O x 3, Mood & affect appropriate  Gastrointestinal:  Soft, Non-tender, + BS, no bruits	  Skin: No rashes, No ecchymoses, No cyanosis  Neurologic: Grossly non-focal with full strength in all four extremities  Extremities: Normal range of motion, No clubbing, cyanosis or edema  Vascular: Peripheral pulses palpable 2+ bilaterally    INTERPRETATION OF TELEMETRY: SR    ECG: SR, T wave inversions diffusely inferiorly, anterolaterally     I&O's Detail      LABS:                        7.7    5.19  )-----------( 119      ( 19 Aug 2019 08:33 )             23.4     08-19    143  |  105  |  48.0<H>  ----------------------------<  168<H>  4.7   |  28.0  |  1.31<H>    Ca    8.3<L>      19 Aug 2019 08:33  Mg     2.5     08-19    TPro  6.1<L>  /  Alb  2.9<L>  /  TBili  0.5  /  DBili  x   /  AST  24  /  ALT  15  /  AlkPhos  82  08-18        PT/INR - ( 19 Aug 2019 08:33 )   PT: 14.0 sec;   INR: 1.21 ratio         PTT - ( 18 Aug 2019 12:06 )  PTT:28.3 sec    I&O's Summary      RADIOLOGY & ADDITIONAL STUDIES:  X-ray:    < from: Xray Chest 1 View- PORTABLE-Urgent (08.18.19 @ 11:47) >  EXAM:  XR CHEST PORTABLE URGENT 1V                          PROCEDURE DATE:  08/18/2019          INTERPRETATION:  Portable chest radiograph        CLINICAL INFORMATION:   Weakness. Assess for pneumonia.    TECHNIQUE:  Portable  AP view of the chest was obtained.    COMPARISON: No previous examinations are available for review.    FINDINGS:   The lungs  show RIGHT lower lobe airspace consolidation and moderate   RIGHT effusion. LEFT lung clear.    The  heart is enlarged in transverse diameter. No hilar mass. Trachea   midline.  Status post coronary artery bypass graft procedure.   Visualized osseous structures are intact.        IMPRESSION:   Cardiomegaly. Moderate RIGHT effusion and/or RIGHT lower   lobe atelectasis/.    < end of copied text >    PREVIOUS DIAGNOSTIC TESTING:      ECHO  FINDINGS:  TTE 02/2019: EF >75%, hyperdynamic LV, mild concentril LVH, mild MAC, mod MR, mod-severe TR, PA systolic 55.7, mod pulm HTN    STRESS    FINDINGS:  Nuclear Stress 11/2018 with mild RCA ischemia       < from: CT Angio Abdomen and Pelvis w/ IV Cont (08.18.19 @ 13:23) >  FINDINGS:    LOWER CHEST: Moderate right pleural effusion and probable secondary   compression atelectasis involving the right lower lobe. Coronary artery   calcification. Prior sternotomy. Mild to moderate cardiomegaly.    LIVER: Probable transient diminished density of the right hepatic lobe.   Nonspecific periportal edema.  BILE DUCTS: Normal caliber.  GALLBLADDER: Within normal limits.  SPLEEN: Within normal limits.  PANCREAS: Within normal limits.  ADRENALS: Within normal limits.  KIDNEYS/URETERS: 2 cm sized cyst lower left kidney. Probable renal   vascular calcifications bilaterally. Definite exclusion of a few punctate   nonobstructing right renal calculi cannot be made.    BLADDER: Within normal limits.  REPRODUCTIVE ORGANS: Unremarkable uterus. No adnexal mass.    BOWEL: No bowel obstruction. Appendix is normal. No arterial phase   extravasation of contrast or pooling of the contrast on delayed phase   imaging to suggest GI bleed.  PERITONEUM: No ascites.  VESSELS: Atherosclerotic changes.  RETROPERITONEUM/LYMPH NODES: No lymphadenopathy.    ABDOMINAL WALL: Haziness and stranding involving the subcutaneous soft   tissues indicating anasarca.  BONES: Within normal limits.    IMPRESSION:     No CT evidence of GI bleed at this time.    Right pleural effusion and probable anasarca.                    JEREMI VALIENTE M.D., ATTENDING RADIOLOGIST    < end of copied text >

## 2019-08-19 NOTE — ED ADULT NURSE REASSESSMENT NOTE - NS ED NURSE REASSESS COMMENT FT1
assumed care of pt pt changed of episode of urinary incontinence.  pt c/o rlq abd pain denies n/v.
pt c/o increase in rlq abdominal pain, called medicine PA, awaiting further orders.
Patient received awake and alert x4.  Patient GAN.  Patient on cardiac monitor reads 66 nSR.  Patient has no labored breathing, is in no acute distress.  Patient on 3L NC.

## 2019-08-19 NOTE — PROGRESS NOTE ADULT - SUBJECTIVE AND OBJECTIVE BOX
Chief Complaint: This is a 64y old Female patient being seen for follow-up consultation for anemia, melena.    HPI / 24H events:  Patient denies any bowel movements overnight.  Complains of chronic pain on her whole left side.  Denies nausea or vomiting.    ROS: A 14-point review of systems was reviewed and was otherwise negative save what was reported in the HPI.    PAST MEDICAL/SURGICAL HISTORY:  Legally blind in right eye, as defined in [place]  CAD in native artery  Chronic CHF  DM (diabetes mellitus), type 2  GERD (gastroesophageal reflux disease)  Hyperlipidemia  HTN (hypertension)  H/O carotid endarterectomy  S/P CABG (coronary artery bypass graft)    MEDICATIONS  (STANDING):  atorvastatin 80 milliGRAM(s) Oral at bedtime  carvedilol 6.25 milliGRAM(s) Oral every 12 hours  dextrose 5%. 1000 milliLiter(s) (50 mL/Hr) IV Continuous <Continuous>  dextrose 50% Injectable 12.5 Gram(s) IV Push once  dextrose 50% Injectable 25 Gram(s) IV Push once  dextrose 50% Injectable 25 Gram(s) IV Push once  DULoxetine 60 milliGRAM(s) Oral daily  insulin lispro (HumaLOG) corrective regimen sliding scale   SubCutaneous three times a day before meals  isosorbide   mononitrate ER Tablet (IMDUR) 60 milliGRAM(s) Oral daily  pantoprazole    Tablet 40 milliGRAM(s) Oral before breakfast    MEDICATIONS  (PRN):  dextrose 40% Gel 15 Gram(s) Oral once PRN Blood Glucose LESS THAN 70 milliGRAM(s)/deciliter  glucagon  Injectable 1 milliGRAM(s) IntraMuscular once PRN Glucose LESS THAN 70 milligrams/deciliter    VITAL SIGNS LAST 24 HOURS:  T(C): 36.4 (19 Aug 2019 08:07), Max: 36.9 (18 Aug 2019 10:55)  T(F): 97.6 (19 Aug 2019 08:07), Max: 98.5 (18 Aug 2019 10:55)  HR: 66 (19 Aug 2019 08:07) (66 - 74)  BP: 127/57 (19 Aug 2019 08:07) (106/65 - 127/57)  BP(mean): --  RR: 21 (19 Aug 2019 08:07) (16 - 21)  SpO2: 100% (19 Aug 2019 08:07) (97% - 100%)    PHYSICAL EXAM:  Constitutional: Well-developed, well-nourished, in no apparent distress  Eyes: Sclerae anicteric, conjunctivae normal  ENMT: Mucus membranes moist, no oropharyngeal thrush noted  Neck: No thyroid nodules appreciated, no significant cervical or supraclavicular lymphadenopathy  Respiratory: Breathing nonlabored; clear to auscultation  Cardiovascular: Regular rate and rhythm, holosystolic murmur  Gastrointestinal: Soft, nontender, nondistended, normoactive bowel sounds; no hepatosplenomegaly appreciated; no rebound tenderness or involuntary guarding  Extremities: No clubbing, cyanosis or edema  Neurological: Alert and oriented to person, place and time; no asterixis  Skin: No jaundice  Lymph Nodes: No significant lymphadenopathy  Musculoskeletal: No significant peripheral atrophy  Psychiatric: Affect and mood appropriate                            7.7    5.19  )-----------( 119      ( 19 Aug 2019 08:33 )             23.4     08-19    143  |  105  |  48.0<H>  ----------------------------<  168<H>  4.7   |  28.0  |  1.31<H>    Ca    8.3<L>      19 Aug 2019 08:33  Mg     2.5     08-19    TPro  6.1<L>  /  Alb  2.9<L>  /  TBili  0.5  /  DBili  x   /  AST  24  /  ALT  15  /  AlkPhos  82  08-18    LIVER FUNCTIONS - ( 18 Aug 2019 12:06 )  Alb: 2.9 g/dL / Pro: 6.1 g/dL / ALK PHOS: 82 U/L / ALT: 15 U/L / AST: 24 U/L / GGT: x           PT/INR - ( 19 Aug 2019 08:33 )   PT: 14.0 sec;   INR: 1.21 ratio         PTT - ( 18 Aug 2019 12:06 )  PTT:28.3 sec    IMAGING: I personally reviewed the CTA, and I agree with the radiologist's interpretation.

## 2019-08-20 ENCOUNTER — APPOINTMENT (OUTPATIENT)
Dept: CARDIOLOGY | Facility: CLINIC | Age: 64
End: 2019-08-20

## 2019-08-20 ENCOUNTER — RESULT REVIEW (OUTPATIENT)
Age: 64
End: 2019-08-20

## 2019-08-20 LAB
ANION GAP SERPL CALC-SCNC: 10 MMOL/L — SIGNIFICANT CHANGE UP (ref 5–17)
BLD GP AB SCN SERPL QL: SIGNIFICANT CHANGE UP
BUN SERPL-MCNC: 50 MG/DL — HIGH (ref 8–20)
CALCIUM SERPL-MCNC: 8.6 MG/DL — SIGNIFICANT CHANGE UP (ref 8.6–10.2)
CHLORIDE SERPL-SCNC: 101 MMOL/L — SIGNIFICANT CHANGE UP (ref 98–107)
CO2 SERPL-SCNC: 28 MMOL/L — SIGNIFICANT CHANGE UP (ref 22–29)
CREAT SERPL-MCNC: 1.44 MG/DL — HIGH (ref 0.5–1.3)
GLUCOSE BLDC GLUCOMTR-MCNC: 167 MG/DL — HIGH (ref 70–99)
GLUCOSE BLDC GLUCOMTR-MCNC: 167 MG/DL — HIGH (ref 70–99)
GLUCOSE BLDC GLUCOMTR-MCNC: 171 MG/DL — HIGH (ref 70–99)
GLUCOSE BLDC GLUCOMTR-MCNC: 216 MG/DL — HIGH (ref 70–99)
GLUCOSE BLDC GLUCOMTR-MCNC: 221 MG/DL — HIGH (ref 70–99)
GLUCOSE BLDC GLUCOMTR-MCNC: 231 MG/DL — HIGH (ref 70–99)
GLUCOSE SERPL-MCNC: 210 MG/DL — HIGH (ref 70–115)
HCT VFR BLD CALC: 29.2 % — LOW (ref 34.5–45)
HGB BLD-MCNC: 9.5 G/DL — LOW (ref 11.5–15.5)
MAGNESIUM SERPL-MCNC: 2.5 MG/DL — SIGNIFICANT CHANGE UP (ref 1.6–2.6)
MCHC RBC-ENTMCNC: 28.4 PG — SIGNIFICANT CHANGE UP (ref 27–34)
MCHC RBC-ENTMCNC: 32.5 GM/DL — SIGNIFICANT CHANGE UP (ref 32–36)
MCV RBC AUTO: 87.2 FL — SIGNIFICANT CHANGE UP (ref 80–100)
PHOSPHATE SERPL-MCNC: 5 MG/DL — HIGH (ref 2.4–4.7)
PLATELET # BLD AUTO: 111 K/UL — LOW (ref 150–400)
POTASSIUM SERPL-MCNC: 4.5 MMOL/L — SIGNIFICANT CHANGE UP (ref 3.5–5.3)
POTASSIUM SERPL-SCNC: 4.5 MMOL/L — SIGNIFICANT CHANGE UP (ref 3.5–5.3)
RBC # BLD: 3.35 M/UL — LOW (ref 3.8–5.2)
RBC # FLD: 16.4 % — HIGH (ref 10.3–14.5)
SODIUM SERPL-SCNC: 139 MMOL/L — SIGNIFICANT CHANGE UP (ref 135–145)
WBC # BLD: 4.91 K/UL — SIGNIFICANT CHANGE UP (ref 3.8–10.5)
WBC # FLD AUTO: 4.91 K/UL — SIGNIFICANT CHANGE UP (ref 3.8–10.5)

## 2019-08-20 PROCEDURE — 99232 SBSQ HOSP IP/OBS MODERATE 35: CPT

## 2019-08-20 PROCEDURE — 43239 EGD BIOPSY SINGLE/MULTIPLE: CPT

## 2019-08-20 PROCEDURE — 88305 TISSUE EXAM BY PATHOLOGIST: CPT | Mod: 26

## 2019-08-20 PROCEDURE — 88342 IMHCHEM/IMCYTCHM 1ST ANTB: CPT | Mod: 26

## 2019-08-20 RX ORDER — INSULIN GLARGINE 100 [IU]/ML
10 INJECTION, SOLUTION SUBCUTANEOUS AT BEDTIME
Refills: 0 | Status: DISCONTINUED | OUTPATIENT
Start: 2019-08-20 | End: 2019-08-20

## 2019-08-20 RX ORDER — INSULIN GLARGINE 100 [IU]/ML
15 INJECTION, SOLUTION SUBCUTANEOUS AT BEDTIME
Refills: 0 | Status: DISCONTINUED | OUTPATIENT
Start: 2019-08-20 | End: 2019-08-23

## 2019-08-20 RX ADMIN — Medication 4: at 07:26

## 2019-08-20 RX ADMIN — ATORVASTATIN CALCIUM 80 MILLIGRAM(S): 80 TABLET, FILM COATED ORAL at 21:26

## 2019-08-20 RX ADMIN — Medication 2: at 16:38

## 2019-08-20 RX ADMIN — OXYCODONE AND ACETAMINOPHEN 2 TABLET(S): 5; 325 TABLET ORAL at 20:29

## 2019-08-20 RX ADMIN — OXYCODONE AND ACETAMINOPHEN 2 TABLET(S): 5; 325 TABLET ORAL at 21:31

## 2019-08-20 RX ADMIN — Medication 2: at 13:45

## 2019-08-20 RX ADMIN — INSULIN GLARGINE 15 UNIT(S): 100 INJECTION, SOLUTION SUBCUTANEOUS at 21:26

## 2019-08-20 NOTE — BRIEF OPERATIVE NOTE - OPERATION/FINDINGS
normal esophagus  Patchy erythema in the fundus and body of the stomach c/w portal HTN gastropathy   normal duodenum

## 2019-08-20 NOTE — PROGRESS NOTE ADULT - ASSESSMENT
64 yr old female with hypertension, hyperlipidemia, diabetes mellitus, HFpEF, CABG, obesity presented with complaints of recurrent lower GI bleeding. She was noted to have Hb 4, PRBC transfusions were given. She has had multiple admissions for the same, undergone EGD and colonoscopy, and was scheduled for a capsule endoscopy. GI was consulted, advised EGD pending cardiac clearance. Cardiology cleared patient. She received 2 additional units of PRBC on 8/19. Hb improved to 9.4

## 2019-08-20 NOTE — PROGRESS NOTE ADULT - SUBJECTIVE AND OBJECTIVE BOX
INTERVAL HPI/OVERNIGHT EVENTS:    CC: symptomatic anemia, GI bleed, CABG, diabetes mellitus, hypertension, hyperlipidemia    No overnight events, awaiting EGD.     Vital Signs Last 24 Hrs  T(C): 36.4 (20 Aug 2019 07:28), Max: 36.7 (19 Aug 2019 11:40)  T(F): 97.6 (20 Aug 2019 07:28), Max: 98 (19 Aug 2019 11:40)  HR: 66 (20 Aug 2019 08:00) (62 - 71)  BP: 113/60 (20 Aug 2019 07:28) (100/52 - 145/71)  BP(mean): --  RR: 18 (20 Aug 2019 07:28) (18 - 18)  SpO2: 100% (20 Aug 2019 07:28) (98% - 100%)    PHYSICAL EXAM:    GENERAL: Not in distress, alert  CHEST/LUNG: b/l air entry  HEART: Regular   ABDOMEN: Soft, BS+  EXTREMITIES:  No edema, tenderness.     MEDICATIONS  (STANDING):  atorvastatin 80 milliGRAM(s) Oral at bedtime  carvedilol 6.25 milliGRAM(s) Oral every 12 hours  dextrose 5%. 1000 milliLiter(s) (50 mL/Hr) IV Continuous <Continuous>  dextrose 50% Injectable 12.5 Gram(s) IV Push once  dextrose 50% Injectable 25 Gram(s) IV Push once  dextrose 50% Injectable 25 Gram(s) IV Push once  DULoxetine 60 milliGRAM(s) Oral daily  insulin glargine Injectable (LANTUS) 10 Unit(s) SubCutaneous at bedtime  insulin lispro (HumaLOG) corrective regimen sliding scale   SubCutaneous three times a day before meals  isosorbide   mononitrate ER Tablet (IMDUR) 60 milliGRAM(s) Oral daily  pantoprazole    Tablet 40 milliGRAM(s) Oral before breakfast    MEDICATIONS  (PRN):  dextrose 40% Gel 15 Gram(s) Oral once PRN Blood Glucose LESS THAN 70 milliGRAM(s)/deciliter  glucagon  Injectable 1 milliGRAM(s) IntraMuscular once PRN Glucose LESS THAN 70 milligrams/deciliter  oxyCODONE    5 mG/acetaminophen 325 mG 2 Tablet(s) Oral every 6 hours PRN Moderate Pain (4 - 6)      Allergies    No Known Allergies    Intolerances          LABS:                          9.5    4.91  )-----------( 111      ( 20 Aug 2019 07:53 )             29.2     08-20    139  |  101  |  50.0<H>  ----------------------------<  210<H>  4.5   |  28.0  |  1.44<H>    Ca    8.6      20 Aug 2019 07:53  Phos  5.0     08-20  Mg     2.5     08-20    TPro  6.1<L>  /  Alb  2.9<L>  /  TBili  0.5  /  DBili  x   /  AST  24  /  ALT  15  /  AlkPhos  82  08-18    PT/INR - ( 19 Aug 2019 08:33 )   PT: 14.0 sec;   INR: 1.21 ratio         PTT - ( 18 Aug 2019 12:06 )  PTT:28.3 sec      RADIOLOGY & ADDITIONAL TESTS:

## 2019-08-20 NOTE — BRIEF OPERATIVE NOTE - NSICDXBRIEFPOSTOP_GEN_ALL_CORE_FT
POST-OP DIAGNOSIS:  Gastritis 20-Aug-2019 13:01:45  Ward Zavala  Portal hypertensive gastropathy 20-Aug-2019 13:01:35  Ward Zavala

## 2019-08-21 ENCOUNTER — TRANSCRIPTION ENCOUNTER (OUTPATIENT)
Age: 64
End: 2019-08-21

## 2019-08-21 LAB
ANION GAP SERPL CALC-SCNC: 11 MMOL/L — SIGNIFICANT CHANGE UP (ref 5–17)
BUN SERPL-MCNC: 54 MG/DL — HIGH (ref 8–20)
CALCIUM SERPL-MCNC: 8.4 MG/DL — LOW (ref 8.6–10.2)
CHLORIDE SERPL-SCNC: 101 MMOL/L — SIGNIFICANT CHANGE UP (ref 98–107)
CO2 SERPL-SCNC: 27 MMOL/L — SIGNIFICANT CHANGE UP (ref 22–29)
CREAT SERPL-MCNC: 1.82 MG/DL — HIGH (ref 0.5–1.3)
GLUCOSE BLDC GLUCOMTR-MCNC: 144 MG/DL — HIGH (ref 70–99)
GLUCOSE BLDC GLUCOMTR-MCNC: 170 MG/DL — HIGH (ref 70–99)
GLUCOSE BLDC GLUCOMTR-MCNC: 188 MG/DL — HIGH (ref 70–99)
GLUCOSE BLDC GLUCOMTR-MCNC: 225 MG/DL — HIGH (ref 70–99)
GLUCOSE SERPL-MCNC: 135 MG/DL — HIGH (ref 70–115)
HCT VFR BLD CALC: 31.9 % — LOW (ref 34.5–45)
HGB BLD-MCNC: 10 G/DL — LOW (ref 11.5–15.5)
MAGNESIUM SERPL-MCNC: 2.6 MG/DL — SIGNIFICANT CHANGE UP (ref 1.6–2.6)
MCHC RBC-ENTMCNC: 28.1 PG — SIGNIFICANT CHANGE UP (ref 27–34)
MCHC RBC-ENTMCNC: 31.3 GM/DL — LOW (ref 32–36)
MCV RBC AUTO: 89.6 FL — SIGNIFICANT CHANGE UP (ref 80–100)
PHOSPHATE SERPL-MCNC: 4.8 MG/DL — HIGH (ref 2.4–4.7)
PLATELET # BLD AUTO: 111 K/UL — LOW (ref 150–400)
POTASSIUM SERPL-MCNC: 4.7 MMOL/L — SIGNIFICANT CHANGE UP (ref 3.5–5.3)
POTASSIUM SERPL-SCNC: 4.7 MMOL/L — SIGNIFICANT CHANGE UP (ref 3.5–5.3)
RBC # BLD: 3.56 M/UL — LOW (ref 3.8–5.2)
RBC # FLD: 16.9 % — HIGH (ref 10.3–14.5)
SODIUM SERPL-SCNC: 139 MMOL/L — SIGNIFICANT CHANGE UP (ref 135–145)
WBC # BLD: 4.8 K/UL — SIGNIFICANT CHANGE UP (ref 3.8–10.5)
WBC # FLD AUTO: 4.8 K/UL — SIGNIFICANT CHANGE UP (ref 3.8–10.5)

## 2019-08-21 PROCEDURE — 99232 SBSQ HOSP IP/OBS MODERATE 35: CPT

## 2019-08-21 PROCEDURE — 76700 US EXAM ABDOM COMPLETE: CPT | Mod: 26

## 2019-08-21 PROCEDURE — 99233 SBSQ HOSP IP/OBS HIGH 50: CPT

## 2019-08-21 RX ORDER — ACETAMINOPHEN 500 MG
650 TABLET ORAL EVERY 6 HOURS
Refills: 0 | Status: DISCONTINUED | OUTPATIENT
Start: 2019-08-21 | End: 2019-08-23

## 2019-08-21 RX ADMIN — OXYCODONE AND ACETAMINOPHEN 2 TABLET(S): 5; 325 TABLET ORAL at 11:46

## 2019-08-21 RX ADMIN — OXYCODONE AND ACETAMINOPHEN 2 TABLET(S): 5; 325 TABLET ORAL at 11:17

## 2019-08-21 RX ADMIN — OXYCODONE AND ACETAMINOPHEN 2 TABLET(S): 5; 325 TABLET ORAL at 18:07

## 2019-08-21 RX ADMIN — PANTOPRAZOLE SODIUM 40 MILLIGRAM(S): 20 TABLET, DELAYED RELEASE ORAL at 05:28

## 2019-08-21 RX ADMIN — CARVEDILOL PHOSPHATE 6.25 MILLIGRAM(S): 80 CAPSULE, EXTENDED RELEASE ORAL at 17:33

## 2019-08-21 RX ADMIN — INSULIN GLARGINE 15 UNIT(S): 100 INJECTION, SOLUTION SUBCUTANEOUS at 21:28

## 2019-08-21 RX ADMIN — DULOXETINE HYDROCHLORIDE 60 MILLIGRAM(S): 30 CAPSULE, DELAYED RELEASE ORAL at 11:17

## 2019-08-21 RX ADMIN — OXYCODONE AND ACETAMINOPHEN 2 TABLET(S): 5; 325 TABLET ORAL at 17:36

## 2019-08-21 RX ADMIN — Medication 2: at 12:54

## 2019-08-21 RX ADMIN — ATORVASTATIN CALCIUM 80 MILLIGRAM(S): 80 TABLET, FILM COATED ORAL at 21:28

## 2019-08-21 RX ADMIN — Medication 2: at 17:33

## 2019-08-21 RX ADMIN — ISOSORBIDE MONONITRATE 60 MILLIGRAM(S): 60 TABLET, EXTENDED RELEASE ORAL at 11:17

## 2019-08-21 NOTE — DISCHARGE NOTE PROVIDER - CARE PROVIDER_API CALL
Lilli Rosenthal (DO)  Gastroenterology  [address], [address]  [place] [address]  Phone: [phone]  Fax: [phone]  Follow Up Time:     Ely Silva)  Cardiology; Internal Medicine  [address], [address]  [place], [place] [id number]  Phone: [phone]  Fax: [phone]  Follow Up Time:

## 2019-08-21 NOTE — DISCHARGE NOTE PROVIDER - HOSPITAL COURSE
64 yr old female with hypertension, hyperlipidemia, diabetes mellitus, HFpEF, CABG, obesity presented with complaints of recurrent lower GI bleeding. She was noted to have Hb 4, PRBC transfusions were given. She has had multiple admissions for the same, undergone EGD and colonoscopy, and was scheduled for a capsule endoscopy. GI was consulted, advised EGD pending cardiac clearance. Cardiology cleared patient. She received 2 additional units of PRBC on 8/19. Hb improved to 9.4. EGD was done, showed portal gastropathy. US abdomen was ordered, showed cirrhosis. Hb remained stable. PT was consulted. She is stable for discharge home, advised close outpatient follow up with GI for capsule endoscopy.        Spent > 35 mins in discharge plan and documentation. 64 yr old female with hypertension, hyperlipidemia, diabetes mellitus, HFpEF, CABG, obesity presented with complaints of recurrent lower GI bleeding. She was noted to have Hb 4, PRBC transfusions were given. She has had multiple admissions for the same, undergone EGD and colonoscopy, and was scheduled for a capsule endoscopy. GI was consulted, advised EGD pending cardiac clearance. Cardiology cleared patient. She received 2 additional units of PRBC on 8/19. Hb improved to 9.4. EGD was done, showed portal gastropathy. US abdomen was ordered, showed cirrhosis. Hb remained stable. PT was consulted. She is stable for discharge home, advised close outpatient follow up with GI for capsule endoscopy. She agreed for SINGH. Stable for discharge.        Spent > 35 mins in discharge plan and documentation.

## 2019-08-21 NOTE — DISCHARGE NOTE PROVIDER - NSDCFUADDINST_GEN_ALL_CORE_FT
Continue medications as instructed, follow up with PMD and cardiology. Follow up with GI for capsule endoscopy. Return to ED for worsening complaints.

## 2019-08-21 NOTE — DISCHARGE NOTE PROVIDER - CARE PROVIDERS DIRECT ADDRESSES
[email or web address] ([email or web address]

## 2019-08-21 NOTE — DISCHARGE NOTE PROVIDER - NSDCCPCAREPLAN_GEN_ALL_CORE_FT
PRINCIPAL DISCHARGE DIAGNOSIS  Diagnosis: Gastrointestinal hemorrhage, unspecified gastrointestinal hemorrhage type  Assessment and Plan of Treatment:

## 2019-08-21 NOTE — PROGRESS NOTE ADULT - ASSESSMENT
64 yr old female with hypertension, hyperlipidemia, diabetes mellitus, HFpEF, CABG, obesity presented with complaints of recurrent lower GI bleeding. She was noted to have Hb 4, PRBC transfusions were given. She has had multiple admissions for the same, undergone EGD and colonoscopy, and was scheduled for a capsule endoscopy. GI was consulted, advised EGD pending cardiac clearance. Cardiology cleared patient. She received 2 additional units of PRBC on 8/19. Hb improved to 9.4. EGD was done, showed portal gastropathy. US abdomen was ordered, showed cirrhosis. Hb remained stable. PT was consulted.

## 2019-08-21 NOTE — PROGRESS NOTE ADULT - SUBJECTIVE AND OBJECTIVE BOX
INTERVAL HPI/OVERNIGHT EVENTS:    CC:  symptomatic anemia, GI bleed, CABG, diabetes mellitus, hypertension, hyperlipidemia    No overnight events, tolerating diet, Hb stable. No new complaints.    Vital Signs Last 24 Hrs  T(C): 36.6 (21 Aug 2019 08:02), Max: 36.6 (20 Aug 2019 16:12)  T(F): 97.8 (21 Aug 2019 08:02), Max: 97.8 (20 Aug 2019 16:12)  HR: 70 (21 Aug 2019 08:02) (68 - 72)  BP: 134/73 (21 Aug 2019 08:02) (103/57 - 134/73)  BP(mean): --  RR: 18 (21 Aug 2019 08:02) (18 - 20)  SpO2: 99% (21 Aug 2019 08:02) (96% - 99%)    PHYSICAL EXAM:    GENERAL: Not in distress, alert  CHEST/LUNG: b/l air entry  HEART: Regular   ABDOMEN: Soft, BS+  EXTREMITIES:  no edema, tenderness.    MEDICATIONS  (STANDING):  atorvastatin 80 milliGRAM(s) Oral at bedtime  carvedilol 6.25 milliGRAM(s) Oral every 12 hours  dextrose 5%. 1000 milliLiter(s) (50 mL/Hr) IV Continuous <Continuous>  dextrose 50% Injectable 12.5 Gram(s) IV Push once  dextrose 50% Injectable 25 Gram(s) IV Push once  dextrose 50% Injectable 25 Gram(s) IV Push once  DULoxetine 60 milliGRAM(s) Oral daily  insulin glargine Injectable (LANTUS) 15 Unit(s) SubCutaneous at bedtime  insulin lispro (HumaLOG) corrective regimen sliding scale   SubCutaneous three times a day before meals  isosorbide   mononitrate ER Tablet (IMDUR) 60 milliGRAM(s) Oral daily  pantoprazole    Tablet 40 milliGRAM(s) Oral before breakfast    MEDICATIONS  (PRN):  dextrose 40% Gel 15 Gram(s) Oral once PRN Blood Glucose LESS THAN 70 milliGRAM(s)/deciliter  glucagon  Injectable 1 milliGRAM(s) IntraMuscular once PRN Glucose LESS THAN 70 milligrams/deciliter  oxyCODONE    5 mG/acetaminophen 325 mG 2 Tablet(s) Oral every 6 hours PRN Moderate Pain (4 - 6)      Allergies    No Known Allergies    Intolerances          LABS:                          10.0   4.80  )-----------( 111      ( 21 Aug 2019 08:28 )             31.9     08-21    139  |  101  |  54.0<H>  ----------------------------<  135<H>  4.7   |  27.0  |  1.82<H>    Ca    8.4<L>      21 Aug 2019 08:28  Phos  4.8     08-21  Mg     2.6     08-21            RADIOLOGY & ADDITIONAL TESTS:

## 2019-08-21 NOTE — PROGRESS NOTE ADULT - SUBJECTIVE AND OBJECTIVE BOX
Pt seen and examined :F/U recurant GI bleeding    This AM patient seen in radiology where she is gettting anupper abdominal sonogra. Yesterday EGD showed gastritis with appearance possibly c/w portal hypertensive gastropathy but no hx of cirrhosis. No BM for two days. H/H is up. No abdominal pain, nausea or vomiting.    REVIEW OF SYSTEMS:    CONSTITUTIONAL: No fever, weight loss, or fatigue  EYES: No eye pain, visual disturbances, or discharge  ENMT:  No difficulty hearing, tinnitus, vertigo; No sinus or throat pain  RESPIRATORY: No cough, wheezing, chills or hemoptysis; No shortness of breath  CARDIOVASCULAR: No chest pain, palpitations, dizziness, or leg swelling  GASTROINTESTINAL: No abdominal or epigastric pain. No nausea, vomiting, or hematemesis; No diarrhea or constipation. No melena or hematochezia.    MEDICATIONS:  MEDICATIONS  (STANDING):  atorvastatin 80 milliGRAM(s) Oral at bedtime  carvedilol 6.25 milliGRAM(s) Oral every 12 hours  dextrose 5%. 1000 milliLiter(s) (50 mL/Hr) IV Continuous <Continuous>  dextrose 50% Injectable 12.5 Gram(s) IV Push once  dextrose 50% Injectable 25 Gram(s) IV Push once  dextrose 50% Injectable 25 Gram(s) IV Push once  DULoxetine 60 milliGRAM(s) Oral daily  insulin glargine Injectable (LANTUS) 15 Unit(s) SubCutaneous at bedtime  insulin lispro (HumaLOG) corrective regimen sliding scale   SubCutaneous three times a day before meals  isosorbide   mononitrate ER Tablet (IMDUR) 60 milliGRAM(s) Oral daily  pantoprazole    Tablet 40 milliGRAM(s) Oral before breakfast    MEDICATIONS  (PRN):  dextrose 40% Gel 15 Gram(s) Oral once PRN Blood Glucose LESS THAN 70 milliGRAM(s)/deciliter  glucagon  Injectable 1 milliGRAM(s) IntraMuscular once PRN Glucose LESS THAN 70 milligrams/deciliter  oxyCODONE    5 mG/acetaminophen 325 mG 2 Tablet(s) Oral every 6 hours PRN Moderate Pain (4 - 6)      Allergies    No Known Allergies    Intolerances        Vital Signs Last 24 Hrs  T(C): 36.6 (21 Aug 2019 08:02), Max: 36.6 (20 Aug 2019 16:12)  T(F): 97.8 (21 Aug 2019 08:02), Max: 97.8 (20 Aug 2019 16:12)  HR: 70 (21 Aug 2019 08:02) (68 - 72)  BP: 134/73 (21 Aug 2019 08:02) (103/57 - 134/73)  BP(mean): --  RR: 18 (21 Aug 2019 08:02) (18 - 20)  SpO2: 99% (21 Aug 2019 08:02) (96% - 100%)    08-20 @ 07:01  -  08-21 @ 07:00  --------------------------------------------------------  IN: 600 mL / OUT: 500 mL / NET: 100 mL        PHYSICAL EXAM:    General: obese female,; in no acute distress  HEENT: MMM, conjunctiva and sclera clear  Gastrointestinal:Abdomen: Soft non-tender non-distended; Normal bowel sounds; No hepatosplenomegaly  Extremities: no cyanosis, clubbing or edema.  Skin: Warm and dry. No obvious rash    LABS:      CBC Full  -  ( 21 Aug 2019 08:28 )  WBC Count : 4.80 K/uL  RBC Count : 3.56 M/uL  Hemoglobin : 10.0 g/dL  Hematocrit : 31.9 %  Platelet Count - Automated : 111 K/uL  Mean Cell Volume : 89.6 fl  Mean Cell Hemoglobin : 28.1 pg  Mean Cell Hemoglobin Concentration : 31.3 gm/dL  Auto Neutrophil # : x  Auto Lymphocyte # : x  Auto Monocyte # : x  Auto Eosinophil # : x  Auto Basophil # : x  Auto Neutrophil % : x  Auto Lymphocyte % : x  Auto Monocyte % : x  Auto Eosinophil % : x  Auto Basophil % : x    08-21    139  |  101  |  54.0<H>  ----------------------------<  135<H>  4.7   |  27.0  |  1.82<H>    Ca    8.4<L>      21 Aug 2019 08:28  Phos  4.8     08-21  Mg     2.6     08-21

## 2019-08-22 DIAGNOSIS — K74.60 UNSPECIFIED CIRRHOSIS OF LIVER: ICD-10-CM

## 2019-08-22 LAB
GLUCOSE BLDC GLUCOMTR-MCNC: 129 MG/DL — HIGH (ref 70–99)
GLUCOSE BLDC GLUCOMTR-MCNC: 155 MG/DL — HIGH (ref 70–99)
GLUCOSE BLDC GLUCOMTR-MCNC: 171 MG/DL — HIGH (ref 70–99)
GLUCOSE BLDC GLUCOMTR-MCNC: 179 MG/DL — HIGH (ref 70–99)
SURGICAL PATHOLOGY STUDY: SIGNIFICANT CHANGE UP

## 2019-08-22 PROCEDURE — 99232 SBSQ HOSP IP/OBS MODERATE 35: CPT

## 2019-08-22 PROCEDURE — 99233 SBSQ HOSP IP/OBS HIGH 50: CPT

## 2019-08-22 RX ADMIN — Medication 650 MILLIGRAM(S): at 13:42

## 2019-08-22 RX ADMIN — CARVEDILOL PHOSPHATE 6.25 MILLIGRAM(S): 80 CAPSULE, EXTENDED RELEASE ORAL at 05:57

## 2019-08-22 RX ADMIN — DULOXETINE HYDROCHLORIDE 60 MILLIGRAM(S): 30 CAPSULE, DELAYED RELEASE ORAL at 12:30

## 2019-08-22 RX ADMIN — Medication 650 MILLIGRAM(S): at 14:27

## 2019-08-22 RX ADMIN — ATORVASTATIN CALCIUM 80 MILLIGRAM(S): 80 TABLET, FILM COATED ORAL at 21:24

## 2019-08-22 RX ADMIN — PANTOPRAZOLE SODIUM 40 MILLIGRAM(S): 20 TABLET, DELAYED RELEASE ORAL at 05:57

## 2019-08-22 RX ADMIN — CARVEDILOL PHOSPHATE 6.25 MILLIGRAM(S): 80 CAPSULE, EXTENDED RELEASE ORAL at 17:02

## 2019-08-22 RX ADMIN — INSULIN GLARGINE 15 UNIT(S): 100 INJECTION, SOLUTION SUBCUTANEOUS at 21:23

## 2019-08-22 RX ADMIN — ISOSORBIDE MONONITRATE 60 MILLIGRAM(S): 60 TABLET, EXTENDED RELEASE ORAL at 12:29

## 2019-08-22 RX ADMIN — Medication 2: at 12:28

## 2019-08-22 RX ADMIN — Medication 2: at 08:17

## 2019-08-22 NOTE — PROGRESS NOTE ADULT - PROBLEM SELECTOR PLAN 2
child beltre A and probably due to [place]. child beltre B (barely) and probably due to [place].

## 2019-08-22 NOTE — PROGRESS NOTE ADULT - SUBJECTIVE AND OBJECTIVE BOX
Pt seen and examined :F/U recurrant GI bleeding    This morning she feels well, tolerating solid food with no complaints. The sono was c/w cirrhosis. H/H stable.    REVIEW OF SYSTEMS:    CONSTITUTIONAL: No fever, weight loss, or fatigue  EYES: No eye pain, visual disturbances, or discharge  ENMT:  No difficulty hearing, tinnitus, vertigo; No sinus or throat pain  RESPIRATORY: No cough, wheezing, chills or hemoptysis; No shortness of breath  CARDIOVASCULAR: No chest pain, palpitations, dizziness, or leg swelling  GASTROINTESTINAL: No abdominal or epigastric pain. No nausea, vomiting, or hematemesis; No diarrhea or constipation. No melena or hematochezia.    MEDICATIONS:  MEDICATIONS  (STANDING):  atorvastatin 80 milliGRAM(s) Oral at bedtime  carvedilol 6.25 milliGRAM(s) Oral every 12 hours  dextrose 5%. 1000 milliLiter(s) (50 mL/Hr) IV Continuous <Continuous>  dextrose 50% Injectable 12.5 Gram(s) IV Push once  dextrose 50% Injectable 25 Gram(s) IV Push once  dextrose 50% Injectable 25 Gram(s) IV Push once  DULoxetine 60 milliGRAM(s) Oral daily  insulin glargine Injectable (LANTUS) 15 Unit(s) SubCutaneous at bedtime  insulin lispro (HumaLOG) corrective regimen sliding scale   SubCutaneous three times a day before meals  isosorbide   mononitrate ER Tablet (IMDUR) 60 milliGRAM(s) Oral daily  pantoprazole    Tablet 40 milliGRAM(s) Oral before breakfast    MEDICATIONS  (PRN):  acetaminophen   Tablet .. 650 milliGRAM(s) Oral every 6 hours PRN Moderate Pain (4 - 6)  dextrose 40% Gel 15 Gram(s) Oral once PRN Blood Glucose LESS THAN 70 milliGRAM(s)/deciliter  glucagon  Injectable 1 milliGRAM(s) IntraMuscular once PRN Glucose LESS THAN 70 milligrams/deciliter  oxyCODONE    5 mG/acetaminophen 325 mG 2 Tablet(s) Oral every 6 hours PRN Moderate Pain (4 - 6)      Allergies    No Known Allergies    Intolerances        Vital Signs Last 24 Hrs  T(C): 36.7 (22 Aug 2019 07:57), Max: 36.8 (21 Aug 2019 16:19)  T(F): 98 (22 Aug 2019 07:57), Max: 98.3 (21 Aug 2019 16:19)  HR: 73 (22 Aug 2019 07:57) (67 - 74)  BP: 118/68 (22 Aug 2019 07:57) (118/68 - 132/73)  BP(mean): --  RR: 18 (22 Aug 2019 07:57) (18 - 19)  SpO2: 96% (22 Aug 2019 07:57) (96% - 100%)    08-21 @ 07:01  -  08-22 @ 07:00  --------------------------------------------------------  IN: 550 mL / OUT: 350 mL / NET: 200 mL        PHYSICAL EXAM:    General: obese female in no acute distress  HEENT: MMM, conjunctiva and sclera clear, right corneal opacification  Gastrointestinal:Abdomen: Soft non-tender non-distended; Normal bowel sounds; No hepatosplenomegaly  Extremities: no cyanosis, clubbing or edema.  Skin: Warm and dry. No obvious rash    LABS:      CBC Full  -  ( 21 Aug 2019 08:28 )  WBC Count : 4.80 K/uL  RBC Count : 3.56 M/uL  Hemoglobin : 10.0 g/dL  Hematocrit : 31.9 %  Platelet Count - Automated : 111 K/uL  Mean Cell Volume : 89.6 fl  Mean Cell Hemoglobin : 28.1 pg  Mean Cell Hemoglobin Concentration : 31.3 gm/dL  Auto Neutrophil # : x  Auto Lymphocyte # : x  Auto Monocyte # : x  Auto Eosinophil # : x  Auto Basophil # : x  Auto Neutrophil % : x  Auto Lymphocyte % : x  Auto Monocyte % : x  Auto Eosinophil % : x  Auto Basophil % : x    08-21    139  |  101  |  54.0<H>  ----------------------------<  135<H>  4.7   |  27.0  |  1.82<H>    Ca    8.4<L>      21 Aug 2019 08:28  Phos  4.8     08-21  Mg     2.6     08-21                        RADIOLOGY & ADDITIONAL STUDIES (The following images were personally reviewed): Pt seen and examined :F/U recurrant GI bleeding    This morning she feels well, tolerating solid food with no complaints. The sono was c/w cirrhosis. H/H stable.    REVIEW OF SYSTEMS:    CONSTITUTIONAL: No fever, weight loss, or fatigue  EYES: No eye pain, visual disturbances, or discharge  ENMT:  No difficulty hearing, tinnitus, vertigo; No sinus or throat pain  RESPIRATORY: No cough, wheezing, chills or hemoptysis; No shortness of breath  CARDIOVASCULAR: No chest pain, palpitations, dizziness, or leg swelling  GASTROINTESTINAL: No abdominal or epigastric pain. No nausea, vomiting, or hematemesis; No diarrhea or constipation. No melena or hematochezia.    MEDICATIONS:  MEDICATIONS  (STANDING):  atorvastatin 80 milliGRAM(s) Oral at bedtime  carvedilol 6.25 milliGRAM(s) Oral every 12 hours  dextrose 5%. 1000 milliLiter(s) (50 mL/Hr) IV Continuous <Continuous>  dextrose 50% Injectable 12.5 Gram(s) IV Push once  dextrose 50% Injectable 25 Gram(s) IV Push once  dextrose 50% Injectable 25 Gram(s) IV Push once  DULoxetine 60 milliGRAM(s) Oral daily  insulin glargine Injectable (LANTUS) 15 Unit(s) SubCutaneous at bedtime  insulin lispro (HumaLOG) corrective regimen sliding scale   SubCutaneous three times a day before meals  isosorbide   mononitrate ER Tablet (IMDUR) 60 milliGRAM(s) Oral daily  pantoprazole    Tablet 40 milliGRAM(s) Oral before breakfast    MEDICATIONS  (PRN):  acetaminophen   Tablet .. 650 milliGRAM(s) Oral every 6 hours PRN Moderate Pain (4 - 6)  dextrose 40% Gel 15 Gram(s) Oral once PRN Blood Glucose LESS THAN 70 milliGRAM(s)/deciliter  glucagon  Injectable 1 milliGRAM(s) IntraMuscular once PRN Glucose LESS THAN 70 milligrams/deciliter  oxyCODONE    5 mG/acetaminophen 325 mG 2 Tablet(s) Oral every 6 hours PRN Moderate Pain (4 - 6)      Allergies    No Known Allergies    Intolerances        Vital Signs Last 24 Hrs  T(C): 36.7 (22 Aug 2019 07:57), Max: 36.8 (21 Aug 2019 16:19)  T(F): 98 (22 Aug 2019 07:57), Max: 98.3 (21 Aug 2019 16:19)  HR: 73 (22 Aug 2019 07:57) (67 - 74)  BP: 118/68 (22 Aug 2019 07:57) (118/68 - 132/73)  BP(mean): --  RR: 18 (22 Aug 2019 07:57) (18 - 19)  SpO2: 96% (22 Aug 2019 07:57) (96% - 100%)    08-21 @ 07:01  -  08-22 @ 07:00  --------------------------------------------------------  IN: 550 mL / OUT: 350 mL / NET: 200 mL        PHYSICAL EXAM:    General: obese female in no acute distress  HEENT: MMM, conjunctiva and sclera clear, right corneal opacification  Gastrointestinal:Abdomen: Soft non-tender non-distended; Normal bowel sounds; No hepatosplenomegaly  Extremities: no cyanosis, clubbing or edema.  Skin: Warm and dry. No obvious rash    LABS:      CBC Full  -  ( 21 Aug 2019 08:28 )  WBC Count : 4.80 K/uL  RBC Count : 3.56 M/uL  Hemoglobin : 10.0 g/dL  Hematocrit : 31.9 %  Platelet Count - Automated : 111 K/uL  Mean Cell Volume : 89.6 fl  Mean Cell Hemoglobin : 28.1 pg  Mean Cell Hemoglobin Concentration : 31.3 gm/dL  Auto Neutrophil # : x  Auto Lymphocyte # : x  Auto Monocyte # : x  Auto Eosinophil # : x  Auto Basophil # : x  Auto Neutrophil % : x  Auto Lymphocyte % : x  Auto Monocyte % : x  Auto Eosinophil % : x  Auto Basophil % : x    08-21    139  |  101  |  54.0<H>  ----------------------------<  135<H>  4.7   |  27.0  |  1.82<H>    Ca    8.4<L>      21 Aug 2019 08:28  Phos  4.8     08-21  Mg     2.6     08-21                        RADIOLOGY & ADDITIONAL STUDIES (The following images were personally reviewed):  < from: US Abdomen Complete (08.21.19 @ 09:41) >   EXAM:  US ABDOMEN COMPLETE                          PROCEDURE DATE:  08/21/2019          INTERPRETATION:  CLINICAL INFORMATION: GI bleed. Portal gastropathy on   LAUREN.    COMPARISON: CT chest/abdomen/pelvis 8/18/2019    TECHNIQUE: Sonography of theabdomen.     FINDINGS:    Liver: Slightly lobular liver contour. Slightly coarsened echotexture.   Patent main portal vein with proper direction of flow.    Bile ducts: Normal caliber. Common bile duct measures 2 mm.     Gallbladder: No gallstones. Minimal wall thickening which can be   secondary to underlying liver disease.        Pancreas: Visualized portions are within normal limits.    Spleen: 9.1 cm. Within normal limits.    Right kidney: 10.9 cm. No hydronephrosis.    Left kidney: 11.2 cm.No hydronephrosis.    Ascites: There is a small amount of perihepatic ascites. Note is made of   a right pleural effusion as seen on the prior CT and a small left pleural   effusion, larger on the right.    Aorta and IVC: Visualized portions are within normal limits.    IMPRESSION:     Slightly lobular surface contour of the liver which can be seen in the   setting of cirrhosis.    Slightly coarsened echotexture of the liver which likely reflects   underlying hepatocellular disease.    Small amount of perihepatic ascites and bilateral pleural effusions,   larger on the right.                SAPNA GRACE   This document has been electronically signed. Aug 21 2019 10:00AM                  < end of copied text >

## 2019-08-22 NOTE — PHYSICAL THERAPY INITIAL EVALUATION ADULT - ADDITIONAL COMMENTS
PLOF unclear. Pt is a poor historian. States she lives in a house with 15 DAVID and that her sister's live there too. Unable to obtain any other information at this time. Reached out to Nickie AMANDA for more information which is pending.

## 2019-08-22 NOTE — PHYSICAL THERAPY INITIAL EVALUATION ADULT - ACTIVE RANGE OF MOTION EXAMINATION, REHAB EVAL
pt with difficulty following command for ROM assessment./bilateral upper extremity Active ROM was WFL (within functional limits)

## 2019-08-22 NOTE — PHYSICAL THERAPY INITIAL EVALUATION ADULT - GAIT DEVIATIONS NOTED, PT EVAL
decreased guillermo/decreased step length/pt requires tactile cueing for direction and PT to navigate RW for her

## 2019-08-22 NOTE — PROGRESS NOTE ADULT - ASSESSMENT
64 yr old female with hypertension, hyperlipidemia, diabetes mellitus, HFpEF, CABG, obesity presented with complaints of recurrent lower GI bleeding. She was noted to have Hb 4, PRBC transfusions were given. She has had multiple admissions for the same, undergone EGD and colonoscopy, and was scheduled for a capsule endoscopy. GI was consulted, advised EGD pending cardiac clearance. Cardiology cleared patient. She received 2 additional units of PRBC on 8/19. Hb improved to 9.4. EGD was done, showed portal gastropathy. US abdomen was ordered, showed cirrhosis. Hb remained stable. PT was consulted, advised home with 24/7  supervision vs [facility]. Patient wants to discuss with social work regarding discharge disposition.

## 2019-08-22 NOTE — PROGRESS NOTE ADULT - SUBJECTIVE AND OBJECTIVE BOX
INTERVAL HPI/OVERNIGHT EVENTS:    CC: symptomatic anemia, GI bleed, CABG, diabetes mellitus, hypertension, hyperlipidemia    No overnight events, denies complaints. Unsure if she wants to go home or [facility].    Vital Signs Last 24 Hrs  T(C): 36.7 (22 Aug 2019 07:57), Max: 36.8 (21 Aug 2019 16:19)  T(F): 98 (22 Aug 2019 07:57), Max: 98.3 (21 Aug 2019 16:19)  HR: 73 (22 Aug 2019 07:57) (67 - 74)  BP: 118/68 (22 Aug 2019 07:57) (118/68 - 132/73)  BP(mean): --  RR: 18 (22 Aug 2019 07:57) (18 - 19)  SpO2: 96% (22 Aug 2019 07:57) (96% - 100%)    PHYSICAL EXAM:    GENERAL: Not in distress, alert  CHEST/LUNG: b/l air entry  HEART: Regular   ABDOMEN: Soft, BS+  EXTREMITIES:  No edema, tenderness    MEDICATIONS  (STANDING):  atorvastatin 80 milliGRAM(s) Oral at bedtime  carvedilol 6.25 milliGRAM(s) Oral every 12 hours  dextrose 5%. 1000 milliLiter(s) (50 mL/Hr) IV Continuous <Continuous>  dextrose 50% Injectable 12.5 Gram(s) IV Push once  dextrose 50% Injectable 25 Gram(s) IV Push once  dextrose 50% Injectable 25 Gram(s) IV Push once  DULoxetine 60 milliGRAM(s) Oral daily  insulin glargine Injectable (LANTUS) 15 Unit(s) SubCutaneous at bedtime  insulin lispro (HumaLOG) corrective regimen sliding scale   SubCutaneous three times a day before meals  isosorbide   mononitrate ER Tablet (IMDUR) 60 milliGRAM(s) Oral daily  pantoprazole    Tablet 40 milliGRAM(s) Oral before breakfast    MEDICATIONS  (PRN):  acetaminophen   Tablet .. 650 milliGRAM(s) Oral every 6 hours PRN Moderate Pain (4 - 6)  dextrose 40% Gel 15 Gram(s) Oral once PRN Blood Glucose LESS THAN 70 milliGRAM(s)/deciliter  glucagon  Injectable 1 milliGRAM(s) IntraMuscular once PRN Glucose LESS THAN 70 milligrams/deciliter  oxyCODONE    5 mG/acetaminophen 325 mG 2 Tablet(s) Oral every 6 hours PRN Moderate Pain (4 - 6)      Allergies    No Known Allergies    Intolerances          LABS:                          10.0   4.80  )-----------( 111      ( 21 Aug 2019 08:28 )             31.9     08-21    139  |  101  |  54.0<H>  ----------------------------<  135<H>  4.7   |  27.0  |  1.82<H>    Ca    8.4<L>      21 Aug 2019 08:28  Phos  4.8     08-21  Mg     2.6     08-21            RADIOLOGY & ADDITIONAL TESTS:

## 2019-08-23 ENCOUNTER — TRANSCRIPTION ENCOUNTER (OUTPATIENT)
Age: 64
End: 2019-08-23

## 2019-08-23 VITALS
TEMPERATURE: 98 F | DIASTOLIC BLOOD PRESSURE: 77 MMHG | HEART RATE: 66 BPM | SYSTOLIC BLOOD PRESSURE: 144 MMHG | RESPIRATION RATE: 18 BRPM | OXYGEN SATURATION: 100 %

## 2019-08-23 LAB
GLUCOSE BLDC GLUCOMTR-MCNC: 119 MG/DL — HIGH (ref 70–99)
GLUCOSE BLDC GLUCOMTR-MCNC: 120 MG/DL — HIGH (ref 70–99)

## 2019-08-23 PROCEDURE — 85610 PROTHROMBIN TIME: CPT

## 2019-08-23 PROCEDURE — 85730 THROMBOPLASTIN TIME PARTIAL: CPT

## 2019-08-23 PROCEDURE — 88305 TISSUE EXAM BY PATHOLOGIST: CPT

## 2019-08-23 PROCEDURE — 85027 COMPLETE CBC AUTOMATED: CPT

## 2019-08-23 PROCEDURE — 84100 ASSAY OF PHOSPHORUS: CPT

## 2019-08-23 PROCEDURE — 80053 COMPREHEN METABOLIC PANEL: CPT

## 2019-08-23 PROCEDURE — 86923 COMPATIBILITY TEST ELECTRIC: CPT

## 2019-08-23 PROCEDURE — 93005 ELECTROCARDIOGRAM TRACING: CPT

## 2019-08-23 PROCEDURE — P9016: CPT

## 2019-08-23 PROCEDURE — 82962 GLUCOSE BLOOD TEST: CPT

## 2019-08-23 PROCEDURE — 85018 HEMOGLOBIN: CPT

## 2019-08-23 PROCEDURE — 99285 EMERGENCY DEPT VISIT HI MDM: CPT | Mod: 25

## 2019-08-23 PROCEDURE — 86850 RBC ANTIBODY SCREEN: CPT

## 2019-08-23 PROCEDURE — 88342 IMHCHEM/IMCYTCHM 1ST ANTB: CPT

## 2019-08-23 PROCEDURE — 80048 BASIC METABOLIC PNL TOTAL CA: CPT

## 2019-08-23 PROCEDURE — 71045 X-RAY EXAM CHEST 1 VIEW: CPT

## 2019-08-23 PROCEDURE — 99233 SBSQ HOSP IP/OBS HIGH 50: CPT

## 2019-08-23 PROCEDURE — 86900 BLOOD TYPING SEROLOGIC ABO: CPT

## 2019-08-23 PROCEDURE — 83036 HEMOGLOBIN GLYCOSYLATED A1C: CPT

## 2019-08-23 PROCEDURE — 36430 TRANSFUSION BLD/BLD COMPNT: CPT

## 2019-08-23 PROCEDURE — 86803 HEPATITIS C AB TEST: CPT

## 2019-08-23 PROCEDURE — 76700 US EXAM ABDOM COMPLETE: CPT

## 2019-08-23 PROCEDURE — P9059: CPT

## 2019-08-23 PROCEDURE — 36415 COLL VENOUS BLD VENIPUNCTURE: CPT

## 2019-08-23 PROCEDURE — 74174 CTA ABD&PLVS W/CONTRAST: CPT

## 2019-08-23 PROCEDURE — 99239 HOSP IP/OBS DSCHRG MGMT >30: CPT

## 2019-08-23 PROCEDURE — 86901 BLOOD TYPING SEROLOGIC RH(D): CPT

## 2019-08-23 PROCEDURE — 85014 HEMATOCRIT: CPT

## 2019-08-23 PROCEDURE — 83735 ASSAY OF MAGNESIUM: CPT

## 2019-08-23 RX ORDER — ASPIRIN/CALCIUM CARB/MAGNESIUM 324 MG
81 TABLET ORAL DAILY
Refills: 0 | Status: DISCONTINUED | OUTPATIENT
Start: 2019-08-23 | End: 2019-08-23

## 2019-08-23 RX ADMIN — ISOSORBIDE MONONITRATE 60 MILLIGRAM(S): 60 TABLET, EXTENDED RELEASE ORAL at 12:54

## 2019-08-23 RX ADMIN — Medication 650 MILLIGRAM(S): at 04:43

## 2019-08-23 RX ADMIN — Medication 650 MILLIGRAM(S): at 03:43

## 2019-08-23 RX ADMIN — CARVEDILOL PHOSPHATE 6.25 MILLIGRAM(S): 80 CAPSULE, EXTENDED RELEASE ORAL at 05:11

## 2019-08-23 RX ADMIN — DULOXETINE HYDROCHLORIDE 60 MILLIGRAM(S): 30 CAPSULE, DELAYED RELEASE ORAL at 12:54

## 2019-08-23 RX ADMIN — PANTOPRAZOLE SODIUM 40 MILLIGRAM(S): 20 TABLET, DELAYED RELEASE ORAL at 05:11

## 2019-08-23 NOTE — DISCHARGE NOTE NURSING/CASE MANAGEMENT/SOCIAL WORK - NSDCDPATPORTLINK_GEN_ALL_CORE
You can access the [facility], offered by [facility], by registering with the following website: [email or web address]

## 2019-08-23 NOTE — PROGRESS NOTE ADULT - PROBLEM SELECTOR PROBLEM 3
Chronic CHF
DM (diabetes mellitus), type 2
Gastrointestinal hemorrhage, unspecified gastrointestinal hemorrhage type
Chronic CHF

## 2019-08-23 NOTE — PROGRESS NOTE ADULT - PROVIDER SPECIALTY LIST ADULT
Electrophysiology
Gastroenterology
Hospitalist

## 2019-08-23 NOTE — DISCHARGE NOTE NURSING/CASE MANAGEMENT/SOCIAL WORK - NSDCPEPT PROEDHF_GEN_ALL_CORE
Activities as tolerated/Low salt diet/Call primary care provider for follow up after discharge/Report signs and symptoms to primary care provider/Monitor weight daily

## 2019-08-23 NOTE — PROGRESS NOTE ADULT - PROBLEM SELECTOR PLAN 3
Stable, will give additional Lasix with transfusion.
Continue Lasix, no evidence of fluid overload.
Monitor FS, continue Lantus.
Stabilized hgb 10  No further bleeding
Continue Lasix, no evidence of fluid overload.
Continue Lasix, no evidence of fluid overload.

## 2019-08-23 NOTE — PROGRESS NOTE ADULT - PROBLEM SELECTOR PROBLEM 5
CAD in native artery
HTN (hypertension)
CAD in native artery

## 2019-08-23 NOTE — PROGRESS NOTE ADULT - PROBLEM SELECTOR PLAN 4
Monitor fingersticks, sliding scale coverage.
Monitor FS, continue Lantus.
Continue Aspirin on discharge.
Monitor fingersticks, sliding scale coverage. On Lantus 10 units, will increase to 15 units. Will add pre meal insulin once EGD is done.
Monitor FS, continue Lantus.

## 2019-08-23 NOTE — PROGRESS NOTE ADULT - PROBLEM SELECTOR PLAN 1
possibly due to recurrant right sided diverticular bleeding or small bowel AVMs. Would continue to withhold anticoagulatns except asa and d/c on pantoprazole 40mg PO qAM and iron. She should f/u with Radu Rosenthal as outpatient for capsule endoscopy. Ok to d/c from GI standoint. Will see only prn your request while in hospital.
recurrant-possibly small bowel AVMs. She should f/u with Dr. Rosenthal for capsule endoscopy. She should ming ue on pantoprazole, off all anticoagulants except ASA and on iron supplements.
HFpEF  Cardiomens in place  pressure 27  continue diuresis  ? cardiac cirrhosis Patient has chronic renal insuff  resume demadex  recheck bun/creat
Portal gastropathy on EGD, outpatient capsule endoscopy. Hemodynamically stable.
Plan for EGD today, Hb improved. Monitor CBC.
Portal gastropathy on EGD, US noted. Outpatient GI follow up for capsule endoscopy.
Hb improved, 2 units of PRBC today with Lasix in between transfusions. GI eval noted, plan for EGD tomorrow. Cardiology consulted.
Portal gastropathy on EGD, US noted. Outpatient GI follow up for capsule endoscopy. Gi follow up noted.

## 2019-08-23 NOTE — PROGRESS NOTE ADULT - PROBLEM SELECTOR PLAN 5
Resume Aspirin on discharge.
Aspirin and Plavix on hold. Continue Imdur, statin and Coreg.
Continue Aspirin on discharge.
Continue medications.
Aspirin and Plavix on hold. Continue Imdur, statin and Coreg.

## 2019-08-23 NOTE — PROGRESS NOTE ADULT - REASON FOR ADMISSION
GI bleeding

## 2019-08-23 NOTE — PROGRESS NOTE ADULT - PROBLEM SELECTOR PROBLEM 2
Gastrointestinal hemorrhage, unspecified gastrointestinal hemorrhage type
Cirrhosis of liver
CAD in native artery
Chronic CHF
Gastrointestinal hemorrhage, unspecified gastrointestinal hemorrhage type

## 2019-08-23 NOTE — PROGRESS NOTE ADULT - ASSESSMENT
64 yr old female with hypertension, hyperlipidemia, diabetes mellitus, HFpEF, CABG, obesity presented with complaints of recurrent lower GI bleeding. She was noted to have Hb 4, PRBC transfusions were given. She has had multiple admissions for the same, undergone EGD and colonoscopy, and was scheduled for a capsule endoscopy. GI was consulted, advised EGD pending cardiac clearance. Cardiology cleared patient. She received 2 additional units of PRBC on 8/19. Hb improved to 9.4. EGD was done, showed portal gastropathy. US abdomen was ordered, showed cirrhosis. Hb remained stable. PT was consulted, advised home with 24/7  supervision vs [facility]. Patient wants to discuss with social work regarding discharge disposition. She agreed for [facility].

## 2019-08-23 NOTE — PROGRESS NOTE ADULT - ATTENDING COMMENTS
Discussed with patient and RN.
Discharge home today pending PT evaluation.   Discussed with patient and RN.
Discussed with patient and cardiology.
Discharge planning pending patient preference, [facility] vs home care, discussed with social work.
Stable for discharge to [facility]

## 2019-08-23 NOTE — PROGRESS NOTE ADULT - ASSESSMENT
65 y/o F with a PMHx of hx of CAD, s/p CABG x1 (lima->LAD 5/2015), MADHAVI x2-> LCX & OM1 (8/2015), nuclear stress 11/2018 with mildly positive ischemia in RCA (medically managed), cardiac arrest due to hypercapnea, HFpEF (11/2018 EF 65-70%) s/p Cardiomems 05/2019, moderate RV dysfunction and severe pulmonary HTN, PAD s/p balloon angioplasty, KEITH/CKD, COPD, frequent GI bleeding, right pleural effusions s/p thoracentesis, DM2, HTN, and HLD who presents  to the ED with otoniel BRBPR.  Endo c/w c/w portal hypertensive gastropathy Hgb has stabilized

## 2019-08-23 NOTE — PROGRESS NOTE ADULT - PROBLEM SELECTOR PROBLEM 4
DM (diabetes mellitus), type 2
DM (diabetes mellitus), type 2
CAD in native artery
DM (diabetes mellitus), type 2
DM (diabetes mellitus), type 2

## 2019-08-23 NOTE — PROGRESS NOTE ADULT - SUBJECTIVE AND OBJECTIVE BOX
INTERVAL HPI/OVERNIGHT EVENTS:    CC:symptomatic anemia, GI bleed, CABG, diabetes mellitus, hypertension, hyperlipidemia     No overnight events, denies complaints. Agreed for [facility].    Vital Signs Last 24 Hrs  T(C): 36.4 (23 Aug 2019 08:08), Max: 36.5 (23 Aug 2019 01:13)  T(F): 97.6 (23 Aug 2019 08:08), Max: 97.7 (23 Aug 2019 01:13)  HR: 65 (23 Aug 2019 08:08) (65 - 68)  BP: 143/81 (23 Aug 2019 08:08) (113/61 - 148/79)  BP(mean): --  RR: 19 (23 Aug 2019 08:08) (18 - 19)  SpO2: 98% (23 Aug 2019 08:08) (96% - 100%)    PHYSICAL EXAM:    GENERAL: Not in distress, alert  CHEST/LUNG: b/l air entry  HEART: Regular   ABDOMEN: Soft, BS+  EXTREMITIES:  no edema, tenderness.     MEDICATIONS  (STANDING):  atorvastatin 80 milliGRAM(s) Oral at bedtime  carvedilol 6.25 milliGRAM(s) Oral every 12 hours  dextrose 5%. 1000 milliLiter(s) (50 mL/Hr) IV Continuous <Continuous>  dextrose 50% Injectable 12.5 Gram(s) IV Push once  dextrose 50% Injectable 25 Gram(s) IV Push once  dextrose 50% Injectable 25 Gram(s) IV Push once  DULoxetine 60 milliGRAM(s) Oral daily  insulin glargine Injectable (LANTUS) 15 Unit(s) SubCutaneous at bedtime  insulin lispro (HumaLOG) corrective regimen sliding scale   SubCutaneous three times a day before meals  isosorbide   mononitrate ER Tablet (IMDUR) 60 milliGRAM(s) Oral daily  pantoprazole    Tablet 40 milliGRAM(s) Oral before breakfast    MEDICATIONS  (PRN):  acetaminophen   Tablet .. 650 milliGRAM(s) Oral every 6 hours PRN Moderate Pain (4 - 6)  dextrose 40% Gel 15 Gram(s) Oral once PRN Blood Glucose LESS THAN 70 milliGRAM(s)/deciliter  glucagon  Injectable 1 milliGRAM(s) IntraMuscular once PRN Glucose LESS THAN 70 milligrams/deciliter  oxyCODONE    5 mG/acetaminophen 325 mG 2 Tablet(s) Oral every 6 hours PRN Moderate Pain (4 - 6)      Allergies    No Known Allergies    Intolerances          LABS:                  RADIOLOGY & ADDITIONAL TESTS:

## 2019-08-23 NOTE — PROGRESS NOTE ADULT - SUBJECTIVE AND OBJECTIVE BOX
[facility]  [address], [place] [address]    REASON FOR VISIT: Follow up on multiple co morbidites  UPDATE:  Looking well. Denies any sob but sedentary   Last hgb 10  No further bleeding  EGD showed gastritis with appearance possibly c/w portal hypertensive gastropathy but no hx of cirrhosis.  TELEMETRY MONITORING: Off monitor    ROS:  No fever chills  Cardiac  No cp sob or palp  Resp  no cough no mucus production  Gi  no abd pain no melana  Ext No calf tenderness, no edema    08-22-19 @ 07:01  -  08-23-19 @ 07:00  --------------------------------------------------------  IN: 0 mL / OUT: 50 mL / NET: -50 mL    MEDICATIONS  (STANDING):  atorvastatin 80 milliGRAM(s) Oral at bedtime  carvedilol 6.25 milliGRAM(s) Oral every 12 hours  isosorbide   mononitrate ER Tablet (IMDUR) 60 milliGRAM(s) Oral daily  DULoxetine 60 milliGRAM(s) Oral daily  insulin glargine Injectable (LANTUS) 15 Unit(s) SubCutaneous at bedtime  insulin lispro (HumaLOG) corrective regimen sliding scale   SubCutaneous three times a day before meals  pantoprazole    Tablet 40 milliGRAM(s) Oral before breakfast      Vital Signs Last 24 Hrs  T(C): 36.4 (23 Aug 2019 08:08), Max: 36.5 (23 Aug 2019 01:13)  T(F): 97.6 (23 Aug 2019 08:08), Max: 97.7 (23 Aug 2019 01:13)  HR: 65 (23 Aug 2019 08:08) (65 - 68)  BP: 143/81 (23 Aug 2019 08:08) (113/61 - 148/79)  BP(mean): --  RR: 19 (23 Aug 2019 08:08) (18 - 19)  SpO2: 98% (23 Aug 2019 08:08) (96% - 100%)  T(C): 36.4 (08-23-19 @ 08:08), Max: 36.5 (08-23-19 @ 01:13)  HR: 65 (08-23-19 @ 08:08) (65 - 68)  BP: 143/81 (08-23-19 @ 08:08) (113/61 - 148/79)  RR: 19 (08-23-19 @ 08:08) (18 - 19)  SpO2: 98% (08-23-19 @ 08:08) (96% - 100%)    HEENT Head atraumatic eomi, oral mucosa moist  needs assistance to sit up in bed  CV S1&S2 Regular 1/6 citlaly  RESP  CTA no rales no rhonchi  GI  Soft active bowel sounds non tender  EXT  No clubbing/Cyanosis Trace edema pre tibial  NEURO  Alert oriented  No gross motor or sensory deficits    ECHO  FINDINGS:  TTE 02/2019: EF >75%, hyperdynamic LV, mild concentril LVH, mild MAC, mod MR, mod-severe TR, PA systolic 55.7, mod pulm HTN    STRESS    FINDINGS:  Nuclear Stress 11/2018 with mild RCA ischemia

## 2019-08-23 NOTE — PROGRESS NOTE ADULT - PROBLEM SELECTOR PROBLEM 1
Gastrointestinal hemorrhage, unspecified gastrointestinal hemorrhage type
Gastrointestinal hemorrhage, unspecified gastrointestinal hemorrhage type
Anemia due to blood loss
Chronic CHF
Anemia due to blood loss

## 2019-08-23 NOTE — PROGRESS NOTE ADULT - PROBLEM SELECTOR PLAN 6
Has had rash on his penis for one month.  Pt has seen PCP and was given Nystatin ointment but this is not helping.  Pt states rash is itchy, pt is not sleeping due to irritation.   
Continue medications.
On statin.

## 2019-08-27 RX ORDER — DULOXETINE HYDROCHLORIDE 30 MG/1
1 CAPSULE, DELAYED RELEASE ORAL
Qty: 0 | Refills: 0 | DISCHARGE

## 2019-08-27 RX ORDER — INSULIN LISPRO 100/ML
8 VIAL (ML) SUBCUTANEOUS
Qty: 0 | Refills: 0 | DISCHARGE

## 2019-09-03 ENCOUNTER — MOBILE ON CALL (OUTPATIENT)
Age: 64
End: 2019-09-03

## 2019-09-25 PROBLEM — E78.5 HYPERLIPIDEMIA, UNSPECIFIED: Chronic | Status: ACTIVE | Noted: 2019-08-18

## 2019-09-25 PROBLEM — K21.9 GASTRO-ESOPHAGEAL REFLUX DISEASE WITHOUT ESOPHAGITIS: Chronic | Status: ACTIVE | Noted: 2019-08-18

## 2019-09-25 PROBLEM — I10 ESSENTIAL (PRIMARY) HYPERTENSION: Chronic | Status: ACTIVE | Noted: 2019-08-18

## 2019-09-25 PROBLEM — H54.8 LEGAL BLINDNESS, AS DEFINED IN USA: Chronic | Status: ACTIVE | Noted: 2019-08-18

## 2019-09-25 PROBLEM — E11.9 TYPE 2 DIABETES MELLITUS WITHOUT COMPLICATIONS: Chronic | Status: ACTIVE | Noted: 2019-08-18

## 2019-09-25 PROBLEM — I50.9 HEART FAILURE, UNSPECIFIED: Chronic | Status: ACTIVE | Noted: 2019-08-18

## 2019-09-25 PROBLEM — I25.10 ATHEROSCLEROTIC HEART DISEASE OF NATIVE CORONARY ARTERY WITHOUT ANGINA PECTORIS: Chronic | Status: ACTIVE | Noted: 2019-08-18

## 2019-09-27 ENCOUNTER — APPOINTMENT (OUTPATIENT)
Dept: CARDIOLOGY | Facility: CLINIC | Age: 64
End: 2019-09-27
Payer: MEDICARE

## 2019-09-27 PROCEDURE — 93264 REM MNTR WRLS P-ART PRS SNR: CPT

## 2019-10-18 RX ORDER — TORSEMIDE 20 MG/1
20 TABLET ORAL DAILY
Qty: 60 | Refills: 5 | Status: ACTIVE | COMMUNITY
Start: 2019-07-10

## 2019-11-05 NOTE — PROGRESS NOTE ADULT - ASSESSMENT
Faculty Medicine Attending Addendum:  I have directly reviewed the clinical findings, labs, imaging studies and management of this patient in detail.  I agree with the documentation as recorded by Mari Londono,  with the any exceptions/additions noted below.      Merrick Cartagena DO  Clinical  of Medicine  Internal Medicine Residency Program  [facility]        A/P: 63 yo F presented with hx of CAD, s/p CABG x1 (lima->LAD 5/2015), MADHAVI x2-> LCX & OM1 (8/2015), nuclear stress 11/2018 with mildly positive ischemia in RCA (medically managed), cardiac arrest due to hypercapnea, HFpEF (11/2018 EF 65-70%), moderate RV dysfunction and severe pulmonary HTN, PAD s/p balloon angioplasty, DM2, HTN, and HLD who presents with worsening dyspnea on exertion. She recently was discharged from a 2-month hospitalization as she was being treated for ADHF, KEITH, , GIB, and right pleural effusion s/p thoracentesis. The patient reports that her shortness of breath started a few days ago and was associated with significant fatigue. As per the emergency department notes, the patient was noted to be "slow to respond" two days prior. The patient denied any associated chest pain or palpitations but admits to increased lower extremity swelling. She denied any orthopnea or increased urination. The patient denied any recent changes in her diet or medications and reports being compliant with her medications. She is extremely lethargic and arousable to tactile stimuli, falls asleep during conversation.  No appearance of increased wob, maintaining spO2 >94% via NC.

## 2019-11-21 ENCOUNTER — APPOINTMENT (OUTPATIENT)
Dept: CARDIOLOGY | Facility: CLINIC | Age: 64
End: 2019-11-21

## 2019-12-26 ENCOUNTER — INPATIENT (INPATIENT)
Facility: HOSPITAL | Age: 64
LOS: 38 days | Discharge: NURSING, REHAB OR LONG-TERM CARE | DRG: 853 | End: 2020-02-03
Attending: HOSPITALIST | Admitting: INTERNAL MEDICINE
Payer: MEDICARE

## 2019-12-26 VITALS — DIASTOLIC BLOOD PRESSURE: 62 MMHG | SYSTOLIC BLOOD PRESSURE: 144 MMHG | HEART RATE: 86 BPM

## 2019-12-26 DIAGNOSIS — J96.01 ACUTE RESPIRATORY FAILURE WITH HYPOXIA: ICD-10-CM

## 2019-12-26 DIAGNOSIS — N39.0 URINARY TRACT INFECTION, SITE NOT SPECIFIED: ICD-10-CM

## 2019-12-26 DIAGNOSIS — K92.2 GASTROINTESTINAL HEMORRHAGE, UNSPECIFIED: ICD-10-CM

## 2019-12-26 DIAGNOSIS — I10 ESSENTIAL (PRIMARY) HYPERTENSION: ICD-10-CM

## 2019-12-26 DIAGNOSIS — E87.2 ACIDOSIS: ICD-10-CM

## 2019-12-26 DIAGNOSIS — G93.41 METABOLIC ENCEPHALOPATHY: ICD-10-CM

## 2019-12-26 DIAGNOSIS — N17.9 ACUTE KIDNEY FAILURE, UNSPECIFIED: ICD-10-CM

## 2019-12-26 DIAGNOSIS — D62 ACUTE POSTHEMORRHAGIC ANEMIA: ICD-10-CM

## 2019-12-26 DIAGNOSIS — E87.5 HYPERKALEMIA: ICD-10-CM

## 2019-12-26 DIAGNOSIS — Z95.1 PRESENCE OF AORTOCORONARY BYPASS GRAFT: Chronic | ICD-10-CM

## 2019-12-26 DIAGNOSIS — Z98.890 OTHER SPECIFIED POSTPROCEDURAL STATES: Chronic | ICD-10-CM

## 2019-12-26 DIAGNOSIS — E11.9 TYPE 2 DIABETES MELLITUS WITHOUT COMPLICATIONS: ICD-10-CM

## 2019-12-26 LAB
ACANTHOCYTES BLD QL SMEAR: SIGNIFICANT CHANGE UP
ALBUMIN SERPL ELPH-MCNC: 2.4 G/DL — LOW (ref 3.3–5.2)
ALBUMIN SERPL ELPH-MCNC: 2.5 G/DL — LOW (ref 3.3–5.2)
ALP SERPL-CCNC: 112 U/L — SIGNIFICANT CHANGE UP (ref 40–120)
ALP SERPL-CCNC: 117 U/L — SIGNIFICANT CHANGE UP (ref 40–120)
ALT FLD-CCNC: 13 U/L — SIGNIFICANT CHANGE UP
ALT FLD-CCNC: 13 U/L — SIGNIFICANT CHANGE UP
AMMONIA BLD-MCNC: 59 UMOL/L — HIGH (ref 11–55)
ANION GAP SERPL CALC-SCNC: 18 MMOL/L — HIGH (ref 5–17)
ANION GAP SERPL CALC-SCNC: 18 MMOL/L — HIGH (ref 5–17)
ANION GAP SERPL CALC-SCNC: 19 MMOL/L — HIGH (ref 5–17)
ANION GAP SERPL CALC-SCNC: 21 MMOL/L — HIGH (ref 5–17)
ANISOCYTOSIS BLD QL: SIGNIFICANT CHANGE UP
APPEARANCE UR: ABNORMAL
APTT BLD: 34.3 SEC — SIGNIFICANT CHANGE UP (ref 27.5–36.3)
AST SERPL-CCNC: 40 U/L — HIGH
AST SERPL-CCNC: 41 U/L — HIGH
BACTERIA # UR AUTO: ABNORMAL
BASOPHILS # BLD AUTO: 0 K/UL — SIGNIFICANT CHANGE UP (ref 0–0.2)
BASOPHILS NFR BLD AUTO: 0 % — SIGNIFICANT CHANGE UP (ref 0–2)
BILIRUB SERPL-MCNC: 0.6 MG/DL — SIGNIFICANT CHANGE UP (ref 0.4–2)
BILIRUB SERPL-MCNC: 1 MG/DL — SIGNIFICANT CHANGE UP (ref 0.4–2)
BILIRUB UR-MCNC: NEGATIVE — SIGNIFICANT CHANGE UP
BLD GP AB SCN SERPL QL: SIGNIFICANT CHANGE UP
BUN SERPL-MCNC: 105 MG/DL — HIGH (ref 8–20)
BUN SERPL-MCNC: 108 MG/DL — HIGH (ref 8–20)
BUN SERPL-MCNC: 108 MG/DL — HIGH (ref 8–20)
BUN SERPL-MCNC: 77 MG/DL — HIGH (ref 8–20)
CALCIUM SERPL-MCNC: 7.1 MG/DL — LOW (ref 8.6–10.2)
CALCIUM SERPL-MCNC: 7.4 MG/DL — LOW (ref 8.6–10.2)
CALCIUM SERPL-MCNC: 7.8 MG/DL — LOW (ref 8.6–10.2)
CALCIUM SERPL-MCNC: 8.1 MG/DL — LOW (ref 8.6–10.2)
CHLORIDE SERPL-SCNC: 95 MMOL/L — LOW (ref 98–107)
CHLORIDE SERPL-SCNC: 97 MMOL/L — LOW (ref 98–107)
CHLORIDE SERPL-SCNC: 97 MMOL/L — LOW (ref 98–107)
CHLORIDE SERPL-SCNC: 98 MMOL/L — SIGNIFICANT CHANGE UP (ref 98–107)
CK MB CFR SERPL CALC: 6.3 NG/ML — SIGNIFICANT CHANGE UP (ref 0–6.7)
CK SERPL-CCNC: 336 U/L — HIGH (ref 25–170)
CO2 SERPL-SCNC: 17 MMOL/L — LOW (ref 22–29)
CO2 SERPL-SCNC: 17 MMOL/L — LOW (ref 22–29)
CO2 SERPL-SCNC: 18 MMOL/L — LOW (ref 22–29)
CO2 SERPL-SCNC: 21 MMOL/L — LOW (ref 22–29)
COLOR SPEC: ABNORMAL
CREAT SERPL-MCNC: 3.67 MG/DL — HIGH (ref 0.5–1.3)
CREAT SERPL-MCNC: 4.84 MG/DL — HIGH (ref 0.5–1.3)
CREAT SERPL-MCNC: 5.03 MG/DL — HIGH (ref 0.5–1.3)
CREAT SERPL-MCNC: 5.05 MG/DL — HIGH (ref 0.5–1.3)
DACRYOCYTES BLD QL SMEAR: SLIGHT — SIGNIFICANT CHANGE UP
DIFF PNL FLD: ABNORMAL
ELLIPTOCYTES BLD QL SMEAR: SLIGHT — SIGNIFICANT CHANGE UP
EOSINOPHIL # BLD AUTO: 0.09 K/UL — SIGNIFICANT CHANGE UP (ref 0–0.5)
EOSINOPHIL NFR BLD AUTO: 0.9 % — SIGNIFICANT CHANGE UP (ref 0–6)
EPI CELLS # UR: SIGNIFICANT CHANGE UP
GAS PNL BLDA: SIGNIFICANT CHANGE UP
GLUCOSE BLDC GLUCOMTR-MCNC: 119 MG/DL — HIGH (ref 70–99)
GLUCOSE BLDC GLUCOMTR-MCNC: 125 MG/DL — HIGH (ref 70–99)
GLUCOSE BLDC GLUCOMTR-MCNC: 138 MG/DL — HIGH (ref 70–99)
GLUCOSE BLDC GLUCOMTR-MCNC: 84 MG/DL — SIGNIFICANT CHANGE UP (ref 70–99)
GLUCOSE SERPL-MCNC: 118 MG/DL — HIGH (ref 70–115)
GLUCOSE SERPL-MCNC: 123 MG/DL — HIGH (ref 70–115)
GLUCOSE SERPL-MCNC: 66 MG/DL — LOW (ref 70–115)
GLUCOSE SERPL-MCNC: 96 MG/DL — SIGNIFICANT CHANGE UP (ref 70–115)
GLUCOSE UR QL: NEGATIVE MG/DL — SIGNIFICANT CHANGE UP
HBA1C BLD-MCNC: 5.8 % — HIGH (ref 4–5.6)
HCT VFR BLD CALC: 17.3 % — CRITICAL LOW (ref 34.5–45)
HCT VFR BLD CALC: 22 % — LOW (ref 34.5–45)
HCT VFR BLD CALC: 23 % — LOW (ref 34.5–45)
HCT VFR BLD CALC: 23.5 % — LOW (ref 34.5–45)
HGB BLD-MCNC: 5.8 G/DL — CRITICAL LOW (ref 11.5–15.5)
HGB BLD-MCNC: 7.5 G/DL — LOW (ref 11.5–15.5)
HGB BLD-MCNC: 8.2 G/DL — LOW (ref 11.5–15.5)
HGB BLD-MCNC: 8.2 G/DL — LOW (ref 11.5–15.5)
INR BLD: 1.34 RATIO — HIGH (ref 0.88–1.16)
KETONES UR-MCNC: ABNORMAL
LACTATE BLDV-MCNC: 2.7 MMOL/L — HIGH (ref 0.5–2)
LACTATE SERPL-SCNC: 1.2 MMOL/L — SIGNIFICANT CHANGE UP (ref 0.5–2)
LEUKOCYTE ESTERASE UR-ACNC: ABNORMAL
LYMPHOCYTES # BLD AUTO: 0.64 K/UL — LOW (ref 1–3.3)
LYMPHOCYTES # BLD AUTO: 6.2 % — LOW (ref 13–44)
MACROCYTES BLD QL: SIGNIFICANT CHANGE UP
MAGNESIUM SERPL-MCNC: 2.1 MG/DL — SIGNIFICANT CHANGE UP (ref 1.8–2.6)
MAGNESIUM SERPL-MCNC: 2.2 MG/DL — SIGNIFICANT CHANGE UP (ref 1.6–2.6)
MANUAL SMEAR VERIFICATION: SIGNIFICANT CHANGE UP
MCHC RBC-ENTMCNC: 28.2 PG — SIGNIFICANT CHANGE UP (ref 27–34)
MCHC RBC-ENTMCNC: 29 PG — SIGNIFICANT CHANGE UP (ref 27–34)
MCHC RBC-ENTMCNC: 29.2 PG — SIGNIFICANT CHANGE UP (ref 27–34)
MCHC RBC-ENTMCNC: 29.3 PG — SIGNIFICANT CHANGE UP (ref 27–34)
MCHC RBC-ENTMCNC: 33.5 GM/DL — SIGNIFICANT CHANGE UP (ref 32–36)
MCHC RBC-ENTMCNC: 34.1 GM/DL — SIGNIFICANT CHANGE UP (ref 32–36)
MCHC RBC-ENTMCNC: 34.9 GM/DL — SIGNIFICANT CHANGE UP (ref 32–36)
MCHC RBC-ENTMCNC: 35.7 GM/DL — SIGNIFICANT CHANGE UP (ref 32–36)
MCV RBC AUTO: 82.1 FL — SIGNIFICANT CHANGE UP (ref 80–100)
MCV RBC AUTO: 83 FL — SIGNIFICANT CHANGE UP (ref 80–100)
MCV RBC AUTO: 84 FL — SIGNIFICANT CHANGE UP (ref 80–100)
MCV RBC AUTO: 85.6 FL — SIGNIFICANT CHANGE UP (ref 80–100)
MICROCYTES BLD QL: SLIGHT — SIGNIFICANT CHANGE UP
MONOCYTES # BLD AUTO: 0.45 K/UL — SIGNIFICANT CHANGE UP (ref 0–0.9)
MONOCYTES NFR BLD AUTO: 4.4 % — SIGNIFICANT CHANGE UP (ref 2–14)
NEUTROPHILS # BLD AUTO: 9.1 K/UL — HIGH (ref 1.8–7.4)
NEUTROPHILS NFR BLD AUTO: 88.5 % — HIGH (ref 43–77)
NITRITE UR-MCNC: NEGATIVE — SIGNIFICANT CHANGE UP
OVALOCYTES BLD QL SMEAR: SLIGHT — SIGNIFICANT CHANGE UP
PH UR: 6 — SIGNIFICANT CHANGE UP (ref 5–8)
PHOSPHATE SERPL-MCNC: 6 MG/DL — HIGH (ref 2.4–4.7)
PHOSPHATE SERPL-MCNC: 8 MG/DL — HIGH (ref 2.4–4.7)
PLAT MORPH BLD: NORMAL — SIGNIFICANT CHANGE UP
PLATELET # BLD AUTO: 171 K/UL — SIGNIFICANT CHANGE UP (ref 150–400)
PLATELET # BLD AUTO: 185 K/UL — SIGNIFICANT CHANGE UP (ref 150–400)
PLATELET # BLD AUTO: 223 K/UL — SIGNIFICANT CHANGE UP (ref 150–400)
PLATELET # BLD AUTO: 265 K/UL — SIGNIFICANT CHANGE UP (ref 150–400)
POIKILOCYTOSIS BLD QL AUTO: SIGNIFICANT CHANGE UP
POLYCHROMASIA BLD QL SMEAR: SLIGHT — SIGNIFICANT CHANGE UP
POTASSIUM SERPL-MCNC: 4.9 MMOL/L — SIGNIFICANT CHANGE UP (ref 3.5–5.3)
POTASSIUM SERPL-MCNC: 5.6 MMOL/L — HIGH (ref 3.5–5.3)
POTASSIUM SERPL-MCNC: 6.2 MMOL/L — CRITICAL HIGH (ref 3.5–5.3)
POTASSIUM SERPL-MCNC: 6.3 MMOL/L — CRITICAL HIGH (ref 3.5–5.3)
POTASSIUM SERPL-SCNC: 4.9 MMOL/L — SIGNIFICANT CHANGE UP (ref 3.5–5.3)
POTASSIUM SERPL-SCNC: 5.6 MMOL/L — HIGH (ref 3.5–5.3)
POTASSIUM SERPL-SCNC: 6.2 MMOL/L — CRITICAL HIGH (ref 3.5–5.3)
POTASSIUM SERPL-SCNC: 6.3 MMOL/L — CRITICAL HIGH (ref 3.5–5.3)
PROT SERPL-MCNC: 6.7 G/DL — SIGNIFICANT CHANGE UP (ref 6.6–8.7)
PROT SERPL-MCNC: 7 G/DL — SIGNIFICANT CHANGE UP (ref 6.6–8.7)
PROT UR-MCNC: 500 MG/DL
PROTHROM AB SERPL-ACNC: 15.5 SEC — HIGH (ref 10–12.9)
RBC # BLD: 2.06 M/UL — LOW (ref 3.8–5.2)
RBC # BLD: 2.57 M/UL — LOW (ref 3.8–5.2)
RBC # BLD: 2.8 M/UL — LOW (ref 3.8–5.2)
RBC # BLD: 2.83 M/UL — LOW (ref 3.8–5.2)
RBC # FLD: 17.2 % — HIGH (ref 10.3–14.5)
RBC # FLD: 17.6 % — HIGH (ref 10.3–14.5)
RBC # FLD: 18.4 % — HIGH (ref 10.3–14.5)
RBC # FLD: 21.2 % — HIGH (ref 10.3–14.5)
RBC BLD AUTO: ABNORMAL
RBC CASTS # UR COMP ASSIST: >50 /HPF (ref 0–4)
SCHISTOCYTES BLD QL AUTO: SIGNIFICANT CHANGE UP
SODIUM SERPL-SCNC: 133 MMOL/L — LOW (ref 135–145)
SODIUM SERPL-SCNC: 134 MMOL/L — LOW (ref 135–145)
SODIUM SERPL-SCNC: 134 MMOL/L — LOW (ref 135–145)
SODIUM SERPL-SCNC: 135 MMOL/L — SIGNIFICANT CHANGE UP (ref 135–145)
SP GR SPEC: 1.02 — SIGNIFICANT CHANGE UP (ref 1.01–1.02)
TARGETS BLD QL SMEAR: SLIGHT — SIGNIFICANT CHANGE UP
TROPONIN T SERPL-MCNC: 0.52 NG/ML — HIGH (ref 0–0.06)
UROBILINOGEN FLD QL: NEGATIVE MG/DL — SIGNIFICANT CHANGE UP
WBC # BLD: 10.28 K/UL — SIGNIFICANT CHANGE UP (ref 3.8–10.5)
WBC # BLD: 11.45 K/UL — HIGH (ref 3.8–10.5)
WBC # BLD: 12.11 K/UL — HIGH (ref 3.8–10.5)
WBC # BLD: 12.82 K/UL — HIGH (ref 3.8–10.5)
WBC # FLD AUTO: 10.28 K/UL — SIGNIFICANT CHANGE UP (ref 3.8–10.5)
WBC # FLD AUTO: 11.45 K/UL — HIGH (ref 3.8–10.5)
WBC # FLD AUTO: 12.11 K/UL — HIGH (ref 3.8–10.5)
WBC # FLD AUTO: 12.82 K/UL — HIGH (ref 3.8–10.5)
WBC UR QL: ABNORMAL

## 2019-12-26 PROCEDURE — 99223 1ST HOSP IP/OBS HIGH 75: CPT | Mod: 25

## 2019-12-26 PROCEDURE — 93010 ELECTROCARDIOGRAM REPORT: CPT

## 2019-12-26 PROCEDURE — 76775 US EXAM ABDO BACK WALL LIM: CPT | Mod: 26

## 2019-12-26 PROCEDURE — 71045 X-RAY EXAM CHEST 1 VIEW: CPT | Mod: 26,77

## 2019-12-26 PROCEDURE — 36556 INSERT NON-TUNNEL CV CATH: CPT

## 2019-12-26 PROCEDURE — 90937 HEMODIALYSIS REPEATED EVAL: CPT

## 2019-12-26 PROCEDURE — 99223 1ST HOSP IP/OBS HIGH 75: CPT

## 2019-12-26 PROCEDURE — 99292 CRITICAL CARE ADDL 30 MIN: CPT | Mod: 25

## 2019-12-26 PROCEDURE — 71045 X-RAY EXAM CHEST 1 VIEW: CPT | Mod: 26

## 2019-12-26 PROCEDURE — 99291 CRITICAL CARE FIRST HOUR: CPT | Mod: 25

## 2019-12-26 RX ORDER — FERROUS SULFATE 325(65) MG
0 TABLET ORAL
Qty: 0 | Refills: 0 | DISCHARGE

## 2019-12-26 RX ORDER — CEFTRIAXONE 500 MG/1
INJECTION, POWDER, FOR SOLUTION INTRAMUSCULAR; INTRAVENOUS
Refills: 0 | Status: DISCONTINUED | OUTPATIENT
Start: 2019-12-26 | End: 2019-12-27

## 2019-12-26 RX ORDER — GABAPENTIN 400 MG/1
1 CAPSULE ORAL
Qty: 0 | Refills: 0 | DISCHARGE

## 2019-12-26 RX ORDER — DEXTROSE 50 % IN WATER 50 %
12.5 SYRINGE (ML) INTRAVENOUS ONCE
Refills: 0 | Status: DISCONTINUED | OUTPATIENT
Start: 2019-12-26 | End: 2020-01-23

## 2019-12-26 RX ORDER — PANTOPRAZOLE SODIUM 20 MG/1
80 TABLET, DELAYED RELEASE ORAL ONCE
Refills: 0 | Status: COMPLETED | OUTPATIENT
Start: 2019-12-26 | End: 2019-12-26

## 2019-12-26 RX ORDER — ISOSORBIDE MONONITRATE 60 MG/1
1 TABLET, EXTENDED RELEASE ORAL
Qty: 0 | Refills: 0 | DISCHARGE

## 2019-12-26 RX ORDER — HUMAN INSULIN 100 [IU]/ML
15 INJECTION, SUSPENSION SUBCUTANEOUS
Qty: 0 | Refills: 0 | DISCHARGE

## 2019-12-26 RX ORDER — GLUCAGON INJECTION, SOLUTION 0.5 MG/.1ML
1 INJECTION, SOLUTION SUBCUTANEOUS ONCE
Refills: 0 | Status: DISCONTINUED | OUTPATIENT
Start: 2019-12-26 | End: 2020-01-23

## 2019-12-26 RX ORDER — DEXTROSE 50 % IN WATER 50 %
50 SYRINGE (ML) INTRAVENOUS ONCE
Refills: 0 | Status: COMPLETED | OUTPATIENT
Start: 2019-12-26 | End: 2019-12-26

## 2019-12-26 RX ORDER — INSULIN LISPRO 100/ML
VIAL (ML) SUBCUTANEOUS EVERY 6 HOURS
Refills: 0 | Status: DISCONTINUED | OUTPATIENT
Start: 2019-12-26 | End: 2020-01-23

## 2019-12-26 RX ORDER — CEFTRIAXONE 500 MG/1
1000 INJECTION, POWDER, FOR SOLUTION INTRAMUSCULAR; INTRAVENOUS ONCE
Refills: 0 | Status: COMPLETED | OUTPATIENT
Start: 2019-12-26 | End: 2019-12-26

## 2019-12-26 RX ORDER — CHLORHEXIDINE GLUCONATE 213 G/1000ML
1 SOLUTION TOPICAL
Refills: 0 | Status: DISCONTINUED | OUTPATIENT
Start: 2019-12-26 | End: 2019-12-30

## 2019-12-26 RX ORDER — LOSARTAN POTASSIUM 100 MG/1
1 TABLET, FILM COATED ORAL
Qty: 0 | Refills: 0 | DISCHARGE

## 2019-12-26 RX ORDER — CARVEDILOL PHOSPHATE 80 MG/1
1 CAPSULE, EXTENDED RELEASE ORAL
Qty: 0 | Refills: 0 | DISCHARGE

## 2019-12-26 RX ORDER — TRANEXAMIC ACID 100 MG/ML
1000 INJECTION, SOLUTION INTRAVENOUS ONCE
Refills: 0 | Status: COMPLETED | OUTPATIENT
Start: 2019-12-26 | End: 2019-12-26

## 2019-12-26 RX ORDER — DEXTROSE 50 % IN WATER 50 %
25 SYRINGE (ML) INTRAVENOUS ONCE
Refills: 0 | Status: DISCONTINUED | OUTPATIENT
Start: 2019-12-26 | End: 2020-01-23

## 2019-12-26 RX ORDER — ASPIRIN/CALCIUM CARB/MAGNESIUM 324 MG
1 TABLET ORAL
Qty: 0 | Refills: 0 | DISCHARGE

## 2019-12-26 RX ORDER — INSULIN HUMAN 100 [IU]/ML
10 INJECTION, SOLUTION SUBCUTANEOUS ONCE
Refills: 0 | Status: DISCONTINUED | OUTPATIENT
Start: 2019-12-26 | End: 2019-12-26

## 2019-12-26 RX ORDER — INSULIN ASPART 100 [IU]/ML
10 INJECTION, SUSPENSION SUBCUTANEOUS
Qty: 0 | Refills: 0 | DISCHARGE

## 2019-12-26 RX ORDER — CEFTRIAXONE 500 MG/1
1000 INJECTION, POWDER, FOR SOLUTION INTRAMUSCULAR; INTRAVENOUS EVERY 24 HOURS
Refills: 0 | Status: DISCONTINUED | OUTPATIENT
Start: 2019-12-27 | End: 2019-12-27

## 2019-12-26 RX ORDER — FUROSEMIDE 40 MG
80 TABLET ORAL ONCE
Refills: 0 | Status: COMPLETED | OUTPATIENT
Start: 2019-12-26 | End: 2019-12-26

## 2019-12-26 RX ORDER — CLOPIDOGREL BISULFATE 75 MG/1
1 TABLET, FILM COATED ORAL
Qty: 0 | Refills: 0 | DISCHARGE

## 2019-12-26 RX ORDER — DEXTROSE 50 % IN WATER 50 %
15 SYRINGE (ML) INTRAVENOUS ONCE
Refills: 0 | Status: DISCONTINUED | OUTPATIENT
Start: 2019-12-26 | End: 2020-01-23

## 2019-12-26 RX ORDER — CHLORHEXIDINE GLUCONATE 213 G/1000ML
1 SOLUTION TOPICAL
Refills: 0 | Status: DISCONTINUED | OUTPATIENT
Start: 2019-12-26 | End: 2020-01-23

## 2019-12-26 RX ORDER — ALBUTEROL 90 UG/1
2.5 AEROSOL, METERED ORAL
Refills: 0 | Status: COMPLETED | OUTPATIENT
Start: 2019-12-26 | End: 2019-12-26

## 2019-12-26 RX ORDER — FUROSEMIDE 40 MG
1 TABLET ORAL
Qty: 0 | Refills: 0 | DISCHARGE

## 2019-12-26 RX ORDER — SODIUM CHLORIDE 9 MG/ML
10 INJECTION INTRAMUSCULAR; INTRAVENOUS; SUBCUTANEOUS
Refills: 0 | Status: DISCONTINUED | OUTPATIENT
Start: 2019-12-26 | End: 2019-12-28

## 2019-12-26 RX ORDER — PANTOPRAZOLE SODIUM 20 MG/1
8 TABLET, DELAYED RELEASE ORAL
Qty: 80 | Refills: 0 | Status: DISCONTINUED | OUTPATIENT
Start: 2019-12-26 | End: 2019-12-28

## 2019-12-26 RX ORDER — SODIUM CHLORIDE 9 MG/ML
1000 INJECTION, SOLUTION INTRAVENOUS
Refills: 0 | Status: DISCONTINUED | OUTPATIENT
Start: 2019-12-26 | End: 2020-01-23

## 2019-12-26 RX ORDER — CALCIUM CHLORIDE
1000 POWDER (GRAM) MISCELLANEOUS ONCE
Refills: 0 | Status: COMPLETED | OUTPATIENT
Start: 2019-12-26 | End: 2019-12-26

## 2019-12-26 RX ORDER — PANTOPRAZOLE SODIUM 20 MG/1
1 TABLET, DELAYED RELEASE ORAL
Qty: 0 | Refills: 0 | DISCHARGE

## 2019-12-26 RX ORDER — ATORVASTATIN CALCIUM 80 MG/1
1 TABLET, FILM COATED ORAL
Qty: 0 | Refills: 0 | DISCHARGE

## 2019-12-26 RX ORDER — OCTREOTIDE ACETATE 200 UG/ML
50 INJECTION, SOLUTION INTRAVENOUS; SUBCUTANEOUS
Qty: 500 | Refills: 0 | Status: COMPLETED | OUTPATIENT
Start: 2019-12-26 | End: 2019-12-29

## 2019-12-26 RX ORDER — INSULIN HUMAN 100 [IU]/ML
10 INJECTION, SOLUTION SUBCUTANEOUS ONCE
Refills: 0 | Status: COMPLETED | OUTPATIENT
Start: 2019-12-26 | End: 2019-12-26

## 2019-12-26 RX ADMIN — Medication 1000 MILLIGRAM(S): at 03:01

## 2019-12-26 RX ADMIN — PANTOPRAZOLE SODIUM 10 MG/HR: 20 TABLET, DELAYED RELEASE ORAL at 12:59

## 2019-12-26 RX ADMIN — ALBUTEROL 2.5 MILLIGRAM(S): 90 AEROSOL, METERED ORAL at 03:02

## 2019-12-26 RX ADMIN — CHLORHEXIDINE GLUCONATE 1 APPLICATION(S): 213 SOLUTION TOPICAL at 05:22

## 2019-12-26 RX ADMIN — PANTOPRAZOLE SODIUM 10 MG/HR: 20 TABLET, DELAYED RELEASE ORAL at 05:19

## 2019-12-26 RX ADMIN — Medication 50 MILLILITER(S): at 03:01

## 2019-12-26 RX ADMIN — CEFTRIAXONE 100 MILLIGRAM(S): 500 INJECTION, POWDER, FOR SOLUTION INTRAMUSCULAR; INTRAVENOUS at 05:20

## 2019-12-26 RX ADMIN — SODIUM CHLORIDE 10 MILLILITER(S): 9 INJECTION INTRAMUSCULAR; INTRAVENOUS; SUBCUTANEOUS at 23:32

## 2019-12-26 RX ADMIN — Medication 50 MILLILITER(S): at 01:11

## 2019-12-26 RX ADMIN — INSULIN HUMAN 10 UNIT(S): 100 INJECTION, SOLUTION SUBCUTANEOUS at 03:18

## 2019-12-26 RX ADMIN — TRANEXAMIC ACID 220 MILLIGRAM(S): 100 INJECTION, SOLUTION INTRAVENOUS at 02:58

## 2019-12-26 RX ADMIN — PANTOPRAZOLE SODIUM 10 MG/HR: 20 TABLET, DELAYED RELEASE ORAL at 23:31

## 2019-12-26 RX ADMIN — TRANEXAMIC ACID 1000 MILLIGRAM(S): 100 INJECTION, SOLUTION INTRAVENOUS at 03:51

## 2019-12-26 RX ADMIN — OCTREOTIDE ACETATE 10 MICROGRAM(S)/HR: 200 INJECTION, SOLUTION INTRAVENOUS; SUBCUTANEOUS at 23:31

## 2019-12-26 RX ADMIN — OCTREOTIDE ACETATE 10 MICROGRAM(S)/HR: 200 INJECTION, SOLUTION INTRAVENOUS; SUBCUTANEOUS at 12:59

## 2019-12-26 RX ADMIN — PANTOPRAZOLE SODIUM 80 MILLIGRAM(S): 20 TABLET, DELAYED RELEASE ORAL at 05:19

## 2019-12-26 RX ADMIN — Medication 80 MILLIGRAM(S): at 09:00

## 2019-12-26 NOTE — H&P ADULT - PROBLEM SELECTOR PLAN 2
This seems to be a recurrent issue for her. Last EGD in August revealed portal gastropathy, no intervention was performed. Load with IV protonix and start infusion. Actively transfusing 3u PRBC and had received tranexamic acid in ED. Hold all a/c. Maintain a MAP > 65. Keep NPO. GI consult for potential EGD.

## 2019-12-26 NOTE — ED PROVIDER NOTE - PSH
After cataract    Atherosclerosis of right carotid artery     delivery delivered    Detached retina    H/O carotid endarterectomy    No significant past surgical history    S/P CABG (coronary artery bypass graft)    S/P CABG x 1    Uveitic glaucoma of both eyes

## 2019-12-26 NOTE — H&P ADULT - NSICDXFAMILYHX_GEN_ALL_CORE_FT
FAMILY HISTORY:  FH: type 2 diabetes, mom and dad  No pertinent family history in first degree relatives

## 2019-12-26 NOTE — H&P ADULT - PROBLEM SELECTOR PLAN 5
Secondary to KEITH. Refractory to medical treatment at this point. If unable to correct with further treatment may require urgent hemodialysis. At high risk for cardiac arrhythmias.

## 2019-12-26 NOTE — CONSULT NOTE ADULT - ASSESSMENT
1) KEITH on CKD  2) Hyperkalemia  3) Metabolic encephalopathy  4) Acute blood loss anemia      Pt with KEITH on CKD; required RRT in the recent past  Scr 1.3 in 08/2019;  recent baseline kidney function unknown  Pt now with oliguric KEITH ; likely hemodynamic ATN, hyperkalemia refractory to medical management  Needs urgent HD; consent obtained from  at bedside and sister Bria over the phone  Initiate HD once access obtained  UF 0.5 kg as tolerated  Monitor lytes, BP and UOP  Will follow  Discussed with Dr. WARDU team and HD RN 1) KEITH on CKD  2) Hyperkalemia  3) Metabolic encephalopathy  4) Acute blood loss anemia  5) metabolic acidosis      Pt with KEITH on CKD; required RRT in the recent past  Scr 1.3 in 08/2019;  recent baseline kidney function unknown  Pt now with oliguric KEITH ; likely hemodynamic ATN, hyperkalemia refractory to medical management  Needs urgent HD; consent obtained from  at bedside and sister Bria over the phone  Initiate HD once access obtained  UF 0.5 kg as tolerated  Monitor lytes, BP and UOP  Renal US when stable  Will follow  Discussed with Dr. ADAME team and HD RN

## 2019-12-26 NOTE — H&P ADULT - PROBLEM SELECTOR PLAN 1
May be secondary to atelectasis and splinting from pain. CXR with RLL atelectasis/pleural effusion. Actively titrating ventilator settings to maintain SaO2 > 92%. Increase IPAP given body habitus. Check blood gas. Keep HOB elevated > 30 degrees.

## 2019-12-26 NOTE — ED PROVIDER NOTE - PMH
CAD (coronary artery disease)    CAD in native artery    Chronic CHF    Congestive heart failure, unspecified HF chronicity, unspecified heart failure type    Depression    Diabetes    Diabetes    DM (diabetes mellitus), type 2    GERD (gastroesophageal reflux disease)    GERD (gastroesophageal reflux disease)    Glaucoma    Glaucoma    Herniated disc, cervical    History of CEA (carotid endarterectomy)  Right  History of peripheral vascular disease    History of retinal detachment    HTN (hypertension)    Hyperlipidemia    Hyperlipidemia    Hypertension    Hypertension, unspecified type    Insomnia    Legally blind    Legally blind in right eye, as defined in [place]    PAD (peripheral artery disease)    Peripheral neuropathy    Retinal detachment    Urinary retention

## 2019-12-26 NOTE — H&P ADULT - PROBLEM SELECTOR PLAN 4
KEITH on CKD. BUN/Cr significantly elevated from baseline. Suspect relation to acute blood loss and renal hypoperfusion. Optimize end-organ perfusion with volume resuscitation. Trend BUN/Cr. Monitor lytes and UOP. No indications for urgent hemodialysis at this time. Avoid nephrotoxic agents and renally dose medications.

## 2019-12-26 NOTE — ED PROVIDER NOTE - OBJECTIVE STATEMENT
pt with hx CAD, CHF, DM, HTN presents to ED BIBA due to unresponsiveness. As per nursing home, at baseline pt is alert and verbal, but confused. EMS states pt was hypotensive with BGL of 70 on transport. Nursing home also states they attempted to place a midline, but failed. pt with hx CAD, CHF, DM, HTN presents to ED BIBA due to unresponsiveness. As per nursing home, at baseline pt is alert and verbal, but confused. EMS states pt was hypotensive (90/60) with BGL of 70 on transport. Nursing home also states they attempted to place a midline, but failed.

## 2019-12-26 NOTE — CONSULT NOTE ADULT - SUBJECTIVE AND OBJECTIVE BOX
[facility] DIVISION OF KIDNEY DISEASES AND HYPERTENSION -- INITIAL CONSULT NOTE  --------------------------------------------------------------------------------  HPI:        PAST HISTORY  --------------------------------------------------------------------------------  PAST MEDICAL & SURGICAL HISTORY:  Legally blind in right eye, as defined in [place]  CAD in native artery  Chronic CHF  DM (diabetes mellitus), type 2  GERD (gastroesophageal reflux disease)  Hyperlipidemia  HTN (hypertension)  Congestive heart failure, unspecified HF chronicity, unspecified heart failure type  Insomnia  Retinal detachment  CAD (coronary artery disease)  Glaucoma  Urinary retention  Peripheral neuropathy  GERD (gastroesophageal reflux disease)  Depression  Diabetes  Hypertension, unspecified type  Herniated disc, cervical  PAD (peripheral artery disease)  Legally blind  History of peripheral vascular disease  History of retinal detachment  History of CEA (carotid endarterectomy): Right  Hyperlipidemia  Glaucoma  Hypertension  Diabetes  H/O carotid endarterectomy  S/P CABG (coronary artery bypass graft)  No significant past surgical history  S/P CABG x 1  After cataract  Uveitic glaucoma of both eyes  Detached retina  Atherosclerosis of right carotid artery   delivery delivered    FAMILY HISTORY:  No pertinent family history in first degree relatives  FH: type 2 diabetes: mom and dad    PAST SOCIAL HISTORY:    ALLERGIES & MEDICATIONS  --------------------------------------------------------------------------------  Allergies    ACE inhibitors (Other (Mild))    Intolerances      Standing Inpatient Medications  ALBUTerol    0.083%.. 2.5 milliGRAM(s) Nebulizer every 20 minutes  cefTRIAXone   IVPB      chlorhexidine 2% Cloths 1 Application(s) Topical <User Schedule>  dextrose 5%. 1000 milliLiter(s) IV Continuous <Continuous>  dextrose 50% Injectable 12.5 Gram(s) IV Push once  dextrose 50% Injectable 25 Gram(s) IV Push once  dextrose 50% Injectable 25 Gram(s) IV Push once  insulin lispro (HumaLOG) corrective regimen sliding scale   SubCutaneous every 6 hours  octreotide  Infusion 50 MICROgram(s)/Hr IV Continuous <Continuous>  pantoprazole Infusion 8 mG/Hr IV Continuous <Continuous>    PRN Inpatient Medications  dextrose 40% Gel 15 Gram(s) Oral once PRN  glucagon  Injectable 1 milliGRAM(s) IntraMuscular once PRN      REVIEW OF SYSTEMS  --------------------------------------------------------------------------------  Gen: No weight changes, fatigue, fevers/chills, weakness  Skin: No rashes  Head/Eyes/Ears/Mouth: No headache; Normal hearing; Normal vision w/o blurriness; No sinus pain/discomfort, sore throat  Respiratory: No dyspnea, cough, wheezing, hemoptysis  CV: No chest pain, PND, orthopnea  GI: No abdominal pain, diarrhea, constipation, nausea, vomiting, melena, hematochezia  : No increased frequency, dysuria, hematuria, nocturia  MSK: No joint pain/swelling; no back pain; no edema  Neuro: No dizziness/lightheadedness, weakness, seizures, numbness, tingling  Heme: No easy bruising or bleeding  Endo: No heat/cold intolerance  Psych: No significant nervousness, anxiety, stress, depression    All other systems were reviewed and are negative, except as noted.    VITALS/PHYSICAL EXAM  --------------------------------------------------------------------------------  T(C): 37.1 (19 @ 11:39), Max: 37.1 (19 @ 11:39)  HR: 79 (19 @ 12:00) (76 - 92)  BP: 112/56 (19 @ 12:00) (96/66 - 144/62)  RR: 27 (19 @ 12:00) (13 - 27)  SpO2: 100% (19 @ 12:00) (95% - 100%)  Wt(kg): --    Weight (kg): 97.5 (19 @ 04:52)      19 @ 07:01  -  19 @ 07:00  --------------------------------------------------------  IN: 80 mL / OUT: 40 mL / NET: 40 mL      Physical Exam:  	Gen: altered elderly woman  	HEENT: on BIPAP  	Pulm: CTA B/L  	CV: RRR, S1S2; no rub  	Back: unable to examine  	Abd: +BS, soft, nontender  	: fontenot with minimal urine  	UE: Warm, no edema  	LE: Warm, no edema  	Neuro: doesn't respond to commands  	Psych: unable to assess  	Skin: Warm  	Vascular access: none    LABS/STUDIES  --------------------------------------------------------------------------------              8.2    12.11 >-----------<  185      [19 @ 09:03]              23.5     134  |  98  |  105.0  ----------------------------<  96      [19 @ 09:03]  5.6   |  17.0  |  5.03        Ca     7.8     [19 @ 09:03]      Mg     2.2     [19 09:03]      Phos  8.0     [19 09:03]    TPro  6.7  /  Alb  2.5  /  TBili  0.6  /  DBili  x   /  AST  40  /  ALT  13  /  AlkPhos  112  [19 00:47]    PT/INR: PT 15.5 , INR 1.34       [19:47]  PTT: 34.3       [19:47]    Troponin 0.52      [19:47]        [19:47]    Creatinine Trend:  SCr 5.03 [:03]  SCr 4.84 [:47]  SCr 5.05 [:47]    Urinalysis - [19 02:27]      Color Red / Appearance Cloudy / SG 1.020 / pH 6.0      Gluc Negative / Ketone Trace  / Bili Negative / Urobili Negative       Blood Large / Protein 500 / Leuk Est Moderate / Nitrite Negative      RBC >50 / WBC 6-10 / Hyaline  / Gran  / Sq Epi  / Non Sq Epi Occasional / Bacteria Few      Iron 335, TIBC 419, %sat 80      [19 @ 14:55]  Ferritin 102      [19 14:55]  PTH -- (Ca 9.1)      [19 @ 18:09]   107  PTH -- (Ca 7.9)      [19 @ 16:26]   175  PTH -- (Ca 8.1)      [19 @ 16:55]   148  Vitamin D (25OH) 31.1      [19 @ 18:17]  HbA1c 5.8      [19 09:04]  TSH 1.10      [19 @ 12:21]    HCV 0.07, Nonreact      [19 19:18]    Free Light Chains: kappa 51.96, lambda 5.10, ratio = 10.19      [:34]  Immunofixation Serum:   One IgG Kappa Band and One Weak Free Kappa Light Chains Identified (No  IgD or IgE Bands Identified).    Reference Range: None Detected      [18 19:34] [facility] DIVISION OF KIDNEY DISEASES AND HYPERTENSION -- INITIAL CONSULT NOTE  --------------------------------------------------------------------------------  HPI:    65 y/o F with h/o CHF, moderate pHTN, CAD, CKD, HTN, DM, cirrhosis, portal gastropathy, recurrent GIB, blind, large sacral wound, presents to the ED from NH with AMS, hypoxemia, and hypotension. Patient had large melanotic BM in the ED. H&H: 5.8/17. Lactate: 2, hyperkalemia and KEITH on CKD. Volume resuscitation began with 3u PRBC. Patient minimally responsive and moaning in pain. UA(+) with thick tan urine in fontenot. Developed progressive respiratory distress and hypoxemia requiring NIPPV. Pt now being in MICU. Nephrology consulted for initiation of RRT. Pt seen and examined; she is minimally responsive; history obtained from  at bedside who called sister in law Fraser (who is a RN) over the phone to join the conversation. As per family, pt was recently discharged to Rehab ~2 weeks ago after a month long hospitalization at [facility] for heart failure. Pt also had required 2-3 sessions of HD as per family. Family unaware what her Scr was on discharge.  Sister reports pt was AAOx3 at baseline and had last spoken to her over the phone a week ago. However, when they saw her yesterday at the NH, pt was altered, family asleep and was unable to recognize the family members. Family was told she had an infection of sacral wound. Pt also reportedly has bloody urine.    PAST HISTORY  --------------------------------------------------------------------------------  PAST MEDICAL & SURGICAL HISTORY:  Legally blind in right eye, as defined in [place]  CAD in native artery  Chronic CHF  DM (diabetes mellitus), type 2  GERD (gastroesophageal reflux disease)  Hyperlipidemia  HTN (hypertension)  Congestive heart failure, unspecified HF chronicity, unspecified heart failure type  Insomnia  Retinal detachment  CAD (coronary artery disease)  Glaucoma  Urinary retention  Peripheral neuropathy  GERD (gastroesophageal reflux disease)  Depression  Diabetes  Hypertension, unspecified type  Herniated disc, cervical  PAD (peripheral artery disease)  Legally blind  History of peripheral vascular disease  History of retinal detachment  History of CEA (carotid endarterectomy): Right  Hyperlipidemia  Glaucoma  Hypertension  Diabetes  H/O carotid endarterectomy  S/P CABG (coronary artery bypass graft)  No significant past surgical history  S/P CABG x 1  After cataract  Uveitic glaucoma of both eyes  Detached retina  Atherosclerosis of right carotid artery   delivery delivered    FAMILY HISTORY:  No pertinent family history in first degree relatives  FH: type 2 diabetes: mom and dad    PAST SOCIAL HISTORY:    ALLERGIES & MEDICATIONS  --------------------------------------------------------------------------------  Allergies    ACE inhibitors (Other (Mild))    Intolerances      Standing Inpatient Medications  ALBUTerol    0.083%.. 2.5 milliGRAM(s) Nebulizer every 20 minutes  cefTRIAXone   IVPB      chlorhexidine 2% Cloths 1 Application(s) Topical <User Schedule>  dextrose 5%. 1000 milliLiter(s) IV Continuous <Continuous>  dextrose 50% Injectable 12.5 Gram(s) IV Push once  dextrose 50% Injectable 25 Gram(s) IV Push once  dextrose 50% Injectable 25 Gram(s) IV Push once  insulin lispro (HumaLOG) corrective regimen sliding scale   SubCutaneous every 6 hours  octreotide  Infusion 50 MICROgram(s)/Hr IV Continuous <Continuous>  pantoprazole Infusion 8 mG/Hr IV Continuous <Continuous>    PRN Inpatient Medications  dextrose 40% Gel 15 Gram(s) Oral once PRN  glucagon  Injectable 1 milliGRAM(s) IntraMuscular once PRN      REVIEW OF SYSTEMS  --------------------------------------------------------------------------------  unable to obtain    VITALS/PHYSICAL EXAM  --------------------------------------------------------------------------------  T(C): 37.1 (19 @ 11:39), Max: 37.1 (19 @ 11:39)  HR: 79 (19 @ 12:00) (76 - 92)  BP: 112/56 (19 @ 12:00) (96/66 - 144/62)  RR: 27 (19 @ 12:00) (13 - 27)  SpO2: 100% (19 @ 12:00) (95% - 100%)  Wt(kg): --    Weight (kg): 97.5 (19 @ 04:52)      19 @ 07:01  -  19 @ 07:00  --------------------------------------------------------  IN: 80 mL / OUT: 40 mL / NET: 40 mL      Physical Exam:  	Gen:  elderly woman, minimally responsive  	HEENT: on BIPAP  	Pulm: CTA B/L  	CV: RRR, S1S2; no rub  	Back: unable to examine  	Abd: +BS, soft, nontender  	: fontenot with minimal urine  	UE: Warm, no edema  	LE: Warm, no edema  	Neuro: doesn't respond to verbal stimuli  	Psych: unable to assess  	Skin: Warm  	Vascular access: none    LABS/STUDIES  --------------------------------------------------------------------------------              8.2    12.11 >-----------<  185      [19 09:03]              23.5     134  |  98  |  105.0  ----------------------------<  96      [19 09:03]  5.6   |  17.0  |  5.03        Ca     7.8     [19 09:03]      Mg     2.2     [19 09:03]      Phos  8.0     [19 09:03]    TPro  6.7  /  Alb  2.5  /  TBili  0.6  /  DBili  x   /  AST  40  /  ALT  13  /  AlkPhos  112  [19 00:47]    PT/INR: PT 15.5 , INR 1.34       [19 00:47]  PTT: 34.3       [19 00:47]    Troponin 0.52      [19 00:47]        [19 00:47]    Creatinine Trend:  SCr 5.03 [:03]  SCr 4.84 [:47]  SCr 5.05 [ 00:47]    Urinalysis - [19 02:27]      Color Red / Appearance Cloudy / SG 1.020 / pH 6.0      Gluc Negative / Ketone Trace  / Bili Negative / Urobili Negative       Blood Large / Protein 500 / Leuk Est Moderate / Nitrite Negative      RBC >50 / WBC 6-10 / Hyaline  / Gran  / Sq Epi  / Non Sq Epi Occasional / Bacteria Few      Iron 335, TIBC 419, %sat 80      [19 @ 14:55]  Ferritin 102      [19 @ 14:55]  PTH -- (Ca 9.1)      [19 @ 18:09]   107  PTH -- (Ca 7.9)      [19 @ 16:26]   175  PTH -- (Ca 8.1)      [19 @ 16:55]   148  Vitamin D (25OH) 31.1      [19 @ 18:17]  HbA1c 5.8      [19 @ 09:04]  TSH 1.10      [19 @ 12:21]    HCV 0.07, Nonreact      [19 @ 19:18]    Free Light Chains: kappa 51.96, lambda 5.10, ratio = 10.19      [ @ 19:34]  Immunofixation Serum:   One IgG Kappa Band and One Weak Free Kappa Light Chains Identified (No  IgD or IgE Bands Identified).    Reference Range: None Detected      [18 @ 19:34]

## 2019-12-26 NOTE — ED ADULT TRIAGE NOTE - CHIEF COMPLAINT QUOTE
pt sent from facility for being unresponsive, pt at baseline alert and oriented. pt nonverbal at this time. pt sent from facility for being unresponsive, pt at baseline alert and oriented. pt nonverbal at this time.  code team called.

## 2019-12-26 NOTE — H&P ADULT - PROBLEM SELECTOR PLAN 6
Worsening. Secondary to lactemia and uremia. Continue to treat underlying issues as above. Check blood gas.

## 2019-12-26 NOTE — H&P ADULT - ASSESSMENT
65 y/o F with a h/o dCHF, moderate pHTN, CAD, CKD, HTN, DM, cirrhosis, portal gastropathy, recurrent GIB, blind, large sacral wound, with acute hypoxemic respiratory failure, GIB, acute blood loss anemia, KEITH, hyperkalemia, metabolic acidosis.    Case discussed in detail with eICU attending, Dr. Nolan.    Total critical care time spent on encounter: 48 mins 63 y/o F with a h/o dCHF, moderate pHTN, CAD, CKD, HTN, DM, cirrhosis, portal gastropathy, recurrent GIB, blind, large sacral wound, with acute hypoxemic respiratory failure, GIB, acute blood loss anemia, KEITH, hyperkalemia, metabolic acidosis, UTI, metabolic encephalopathy.    Case discussed in detail with eICU attending, Dr. Nolan.    Total critical care time spent on encounter: 48 mins

## 2019-12-26 NOTE — H&P ADULT - ATTENDING COMMENTS
63 yo female with hx of CHFpEF, CAD, CKD, HTN, DM, cirrhosis, portal gastropathy, recurrent GI bleed, blind, sacral decub ulcer, presented to the ED from nursing home with encephalopathy, hypoxemia, hypotension, found to have anemia with Hb 5, possible UTI with sepsis, KEITH, hyperkalemia.      Plan     Anemia   Responded to blood transfusion, no signs of active bleeding at this time.    - protonix drip, octreotide, ceftriaxone    KEITH  Possibly due to sepsis  - renal US to rule out hydronephrosis    Acute on chronic diastolic CHF/ Acute respiratory failure  - will attempt diuresis    Possible UTI  - empiric abx pending cultures    Encephalopathy  Septic vs hepatic  - ammonia level pending

## 2019-12-26 NOTE — H&P ADULT - NSICDXPASTSURGICALHX_GEN_ALL_CORE_FT
PAST SURGICAL HISTORY:  After cataract     Atherosclerosis of right carotid artery      delivery delivered     Detached retina     H/O carotid endarterectomy     No significant past surgical history     S/P CABG (coronary artery bypass graft)     S/P CABG x 1     Uveitic glaucoma of both eyes

## 2019-12-26 NOTE — ED PROCEDURE NOTE - CPROC ED INFUS LINE DETAIL1
All lumen(s) aspirated and flushed without difficulty./The location was identified, and the area was draped and prepped./The catheter was placed using sterile technique./The guidewire was recovered.

## 2019-12-26 NOTE — H&P ADULT - PROBLEM SELECTOR PLAN 8
Start coverage with sliding scale insulin and Q 6 hour accu-checks while NPO. Likely multifactorial: acidosis, blood loss, infection. Continue to treat underlying issues as above. Monitor for acute changes in mental status. Will consider CT head if this does not improve as underlying issues improve.

## 2019-12-26 NOTE — ED PROVIDER NOTE - CLINICAL SUMMARY MEDICAL DECISION MAKING FREE TEXT BOX
pt with acute gi bleed anemiac resp failure chronic plueral effusion chf high risk detioration if sent to floor blood being replaced on bipap for hypoxemia mental status improving hyperkalemia treated admitted to icu pts  updated onc ritical nature of patient

## 2019-12-26 NOTE — ED PROVIDER NOTE - SKIN, MLM
Skin normal color for race, warm, dry and intact. + chornic sacral decubitus ulcer no signs nec fasc

## 2019-12-26 NOTE — ED PROVIDER NOTE - PROGRESS NOTE DETAILS
Pt presented to ED minimally responsive with an active melanotic GI bleed, morbidly obese, unable to obtain reliable peripheral IV access. Emergency subclavian central line placed, emergent blood ordered. Pt full code. continuing to monitor closely. Consulted ICU PA, coming to evaluate pt.

## 2019-12-26 NOTE — ED PROVIDER NOTE - CRITICAL CARE PROVIDED
consult w/ pt's family directly relating to pts condition/consultation with other physicians/documentation/direct patient care (not related to procedure)/additional history taking/interpretation of diagnostic studies

## 2019-12-26 NOTE — ED ADULT NURSE REASSESSMENT NOTE - NS ED NURSE REASSESS COMMENT FT1
pt presents with sacral ulcer un stageable pt has necrotic boarders wound is approx.  5 inch by 4 inches.  wound bed appeared yellow with wet to dry dressing in place, dressing was saturated with bloody stool and removed replaced by dry guaze.  MD aware.

## 2019-12-26 NOTE — H&P ADULT - MENTAL STATUS
persistently moaning in pain, not responding to questioning, intermittently follows simple commands, moving all extremities spontaneously

## 2019-12-26 NOTE — ED PROVIDER NOTE - CARE PLAN
Principal Discharge DX:	Acute GI bleeding  Secondary Diagnosis:	Anemia  Secondary Diagnosis:	Hyperkalemia  Secondary Diagnosis:	Respiratory failure with hypoxia

## 2019-12-26 NOTE — H&P ADULT - HISTORY OF PRESENT ILLNESS
63 y/o F with a h/o dCHF, moderate pHTN, CAD, CKD, HTN, DM, cirrhosis, portal gastropathy, recurrent GIB, blind, large sacral wound, presents to the ED from NH with AMS, hypoxemia, and hypotension. Patient had large melenotic BM in the ED. H&H: 5.8/17. Lactate: 2.7. KEITH on CKD. Hyperkalemic. Volume resuscitation began with 3u PRBC. Patient minimally responsive and moaning in pain. UA(+) with thick tan urine in fontenot. Developed progressive respiratory distress and hypoxemia requiring NIPPV.

## 2019-12-26 NOTE — H&P ADULT - PROBLEM SELECTOR PLAN 7
Hold antihypertensives for now given acute blood loss and multi-system organ failure. Thick tan urine in fontenot. UA(+). Start course of ceftriaxone. Pancultures pending.

## 2019-12-26 NOTE — H&P ADULT - NSICDXPASTMEDICALHX_GEN_ALL_CORE_FT
PAST MEDICAL HISTORY:  CAD (coronary artery disease)     CAD in native artery     Chronic CHF     Congestive heart failure, unspecified HF chronicity, unspecified heart failure type     Depression     Diabetes     Diabetes     DM (diabetes mellitus), type 2     GERD (gastroesophageal reflux disease)     GERD (gastroesophageal reflux disease)     Glaucoma     Glaucoma     Herniated disc, cervical     History of CEA (carotid endarterectomy) Right    History of peripheral vascular disease     History of retinal detachment     HTN (hypertension)     Hyperlipidemia     Hyperlipidemia     Hypertension     Hypertension, unspecified type     Insomnia     Legally blind     Legally blind in right eye, as defined in [place]     PAD (peripheral artery disease)     Peripheral neuropathy     Retinal detachment     Urinary retention

## 2019-12-27 LAB
ANION GAP SERPL CALC-SCNC: 20 MMOL/L — HIGH (ref 5–17)
BUN SERPL-MCNC: 81 MG/DL — HIGH (ref 8–20)
CALCIUM SERPL-MCNC: 8.1 MG/DL — LOW (ref 8.6–10.2)
CHLORIDE SERPL-SCNC: 98 MMOL/L — SIGNIFICANT CHANGE UP (ref 98–107)
CO2 SERPL-SCNC: 19 MMOL/L — LOW (ref 22–29)
CREAT SERPL-MCNC: 4.2 MG/DL — HIGH (ref 0.5–1.3)
CULTURE RESULTS: SIGNIFICANT CHANGE UP
GLUCOSE BLDC GLUCOMTR-MCNC: 138 MG/DL — HIGH (ref 70–99)
GLUCOSE BLDC GLUCOMTR-MCNC: 144 MG/DL — HIGH (ref 70–99)
GLUCOSE BLDC GLUCOMTR-MCNC: 94 MG/DL — SIGNIFICANT CHANGE UP (ref 70–99)
GLUCOSE SERPL-MCNC: 144 MG/DL — HIGH (ref 70–115)
HBV CORE AB SER-ACNC: SIGNIFICANT CHANGE UP
HBV SURFACE AB SER-ACNC: 18 MIU/ML — SIGNIFICANT CHANGE UP
HBV SURFACE AG SER-ACNC: SIGNIFICANT CHANGE UP
HCT VFR BLD CALC: 23.1 % — LOW (ref 34.5–45)
HCT VFR BLD CALC: 23.2 % — LOW (ref 34.5–45)
HCV AB S/CO SERPL IA: 0.2 S/CO — SIGNIFICANT CHANGE UP (ref 0–0.99)
HCV AB SERPL-IMP: SIGNIFICANT CHANGE UP
HGB BLD-MCNC: 7.9 G/DL — LOW (ref 11.5–15.5)
HGB BLD-MCNC: 8 G/DL — LOW (ref 11.5–15.5)
LEGIONELLA AG UR QL: NEGATIVE — SIGNIFICANT CHANGE UP
MAGNESIUM SERPL-MCNC: 2.1 MG/DL — SIGNIFICANT CHANGE UP (ref 1.8–2.6)
MCHC RBC-ENTMCNC: 28.8 PG — SIGNIFICANT CHANGE UP (ref 27–34)
MCHC RBC-ENTMCNC: 29.4 PG — SIGNIFICANT CHANGE UP (ref 27–34)
MCHC RBC-ENTMCNC: 34.2 GM/DL — SIGNIFICANT CHANGE UP (ref 32–36)
MCHC RBC-ENTMCNC: 34.5 GM/DL — SIGNIFICANT CHANGE UP (ref 32–36)
MCV RBC AUTO: 84.3 FL — SIGNIFICANT CHANGE UP (ref 80–100)
MCV RBC AUTO: 85.3 FL — SIGNIFICANT CHANGE UP (ref 80–100)
PHOSPHATE SERPL-MCNC: 7 MG/DL — HIGH (ref 2.4–4.7)
PLATELET # BLD AUTO: 173 K/UL — SIGNIFICANT CHANGE UP (ref 150–400)
PLATELET # BLD AUTO: 182 K/UL — SIGNIFICANT CHANGE UP (ref 150–400)
POTASSIUM SERPL-MCNC: 5 MMOL/L — SIGNIFICANT CHANGE UP (ref 3.5–5.3)
POTASSIUM SERPL-SCNC: 5 MMOL/L — SIGNIFICANT CHANGE UP (ref 3.5–5.3)
RBC # BLD: 2.72 M/UL — LOW (ref 3.8–5.2)
RBC # BLD: 2.74 M/UL — LOW (ref 3.8–5.2)
RBC # FLD: 18.2 % — HIGH (ref 10.3–14.5)
RBC # FLD: 18.4 % — HIGH (ref 10.3–14.5)
SODIUM SERPL-SCNC: 137 MMOL/L — SIGNIFICANT CHANGE UP (ref 135–145)
SPECIMEN SOURCE: SIGNIFICANT CHANGE UP
WBC # BLD: 14.23 K/UL — HIGH (ref 3.8–10.5)
WBC # BLD: 14.85 K/UL — HIGH (ref 3.8–10.5)
WBC # FLD AUTO: 14.23 K/UL — HIGH (ref 3.8–10.5)
WBC # FLD AUTO: 14.85 K/UL — HIGH (ref 3.8–10.5)

## 2019-12-27 PROCEDURE — 99233 SBSQ HOSP IP/OBS HIGH 50: CPT

## 2019-12-27 PROCEDURE — 99223 1ST HOSP IP/OBS HIGH 75: CPT

## 2019-12-27 PROCEDURE — 71250 CT THORAX DX C-: CPT | Mod: 26

## 2019-12-27 PROCEDURE — 70450 CT HEAD/BRAIN W/O DYE: CPT | Mod: 26

## 2019-12-27 PROCEDURE — 90937 HEMODIALYSIS REPEATED EVAL: CPT

## 2019-12-27 PROCEDURE — 99231 SBSQ HOSP IP/OBS SF/LOW 25: CPT

## 2019-12-27 PROCEDURE — 95819 EEG AWAKE AND ASLEEP: CPT | Mod: 26

## 2019-12-27 RX ORDER — ISOSORBIDE MONONITRATE 60 MG/1
1 TABLET, EXTENDED RELEASE ORAL
Qty: 0 | Refills: 0 | DISCHARGE

## 2019-12-27 RX ORDER — MEROPENEM 1 G/30ML
500 INJECTION INTRAVENOUS EVERY 24 HOURS
Refills: 0 | Status: COMPLETED | OUTPATIENT
Start: 2019-12-27 | End: 2019-12-31

## 2019-12-27 RX ORDER — PIPERACILLIN AND TAZOBACTAM 4; .5 G/20ML; G/20ML
3.38 INJECTION, POWDER, LYOPHILIZED, FOR SOLUTION INTRAVENOUS ONCE
Refills: 0 | Status: COMPLETED | OUTPATIENT
Start: 2019-12-27 | End: 2019-12-27

## 2019-12-27 RX ORDER — PIPERACILLIN AND TAZOBACTAM 4; .5 G/20ML; G/20ML
3.38 INJECTION, POWDER, LYOPHILIZED, FOR SOLUTION INTRAVENOUS EVERY 12 HOURS
Refills: 0 | Status: DISCONTINUED | OUTPATIENT
Start: 2019-12-27 | End: 2019-12-27

## 2019-12-27 RX ORDER — ATORVASTATIN CALCIUM 80 MG/1
1 TABLET, FILM COATED ORAL
Qty: 0 | Refills: 0 | DISCHARGE

## 2019-12-27 RX ORDER — COLLAGENASE CLOSTRIDIUM HIST. 250 UNIT/G
1 OINTMENT (GRAM) TOPICAL DAILY
Refills: 0 | Status: DISCONTINUED | OUTPATIENT
Start: 2019-12-27 | End: 2020-01-23

## 2019-12-27 RX ORDER — IPRATROPIUM/ALBUTEROL SULFATE 18-103MCG
3 AEROSOL WITH ADAPTER (GRAM) INHALATION
Qty: 0 | Refills: 0 | DISCHARGE

## 2019-12-27 RX ORDER — FERROUS SULFATE 325(65) MG
1 TABLET ORAL
Qty: 0 | Refills: 0 | DISCHARGE

## 2019-12-27 RX ORDER — INSULIN LISPRO 100/ML
0 VIAL (ML) SUBCUTANEOUS
Qty: 0 | Refills: 0 | DISCHARGE

## 2019-12-27 RX ORDER — HEPARIN SODIUM 5000 [USP'U]/ML
1 INJECTION INTRAVENOUS; SUBCUTANEOUS
Qty: 0 | Refills: 0 | DISCHARGE

## 2019-12-27 RX ADMIN — PANTOPRAZOLE SODIUM 10 MG/HR: 20 TABLET, DELAYED RELEASE ORAL at 18:50

## 2019-12-27 RX ADMIN — PANTOPRAZOLE SODIUM 10 MG/HR: 20 TABLET, DELAYED RELEASE ORAL at 08:58

## 2019-12-27 RX ADMIN — CHLORHEXIDINE GLUCONATE 1 APPLICATION(S): 213 SOLUTION TOPICAL at 06:15

## 2019-12-27 RX ADMIN — OCTREOTIDE ACETATE 10 MICROGRAM(S)/HR: 200 INJECTION, SOLUTION INTRAVENOUS; SUBCUTANEOUS at 18:49

## 2019-12-27 RX ADMIN — PIPERACILLIN AND TAZOBACTAM 200 GRAM(S): 4; .5 INJECTION, POWDER, LYOPHILIZED, FOR SOLUTION INTRAVENOUS at 11:05

## 2019-12-27 RX ADMIN — MEROPENEM 100 MILLIGRAM(S): 1 INJECTION INTRAVENOUS at 13:41

## 2019-12-27 RX ADMIN — CEFTRIAXONE 100 MILLIGRAM(S): 500 INJECTION, POWDER, FOR SOLUTION INTRAMUSCULAR; INTRAVENOUS at 06:18

## 2019-12-27 RX ADMIN — OCTREOTIDE ACETATE 10 MICROGRAM(S)/HR: 200 INJECTION, SOLUTION INTRAVENOUS; SUBCUTANEOUS at 08:57

## 2019-12-27 NOTE — PROGRESS NOTE ADULT - ASSESSMENT
1) KEITH on CKD  2) Hyperkalemia  3) Metabolic encephalopathy  4) Acute blood loss anemia  5) metabolic acidosis      Pt with KEITH on CKD; required RRT in the recent past  Scr 1.3 in 08/2019;  recent baseline kidney function unknown  Pt now with oliguric KEITH ; likely hemodynamic ATN, hyperkalemia refractory to medical management  Renal US with no hydronephrosis  HD 2nd session today; tolerated 1 kg UF  Start QUENTIN with HD  Monitor lytes, BP and UOP  Will follow

## 2019-12-27 NOTE — EEG REPORT - NS EEG TEXT BOX
KUN ROCHA MRN-[id number]     Study Date: 		12-27-19    ROUTINE EEG    Technical Information:			  		  Placement and Labeling of Electrodes:  The EEG was performed utilizing 20 channels referential EEG connections (coronal over temporal over parasagittal montage) using all standard 10-20 electrode placements with EKG.  Recording was at a sampling rate of 256 samples per second per channel.  Time synchronized digital video recording was done simultaneously with EEG recording.  A low light infrared camera was used for low light recording.  Martínez and seizure detection algorithms were utilized.    CSA Technical Component:  Quantitative EEG analysis using a separate Compressed Spectral Array (CSA) software package was conducted in real-time and run at bedside after set up by the technician, digitally displaying the power of electrographic frequencies included in the 1-30Hz band using a graded color map.  This data was reviewed and interpreted independently, and is reported in a separate section below.    --------------------------------------------------------------------------------------------------  History:  CC/ HPI Patient is a 64y old  Female who presents with a chief complaint of Acute hypoxemic respiratory failure, GIB (27 Dec 2019 12:59)    MEDICATIONS  (STANDING):  ALBUTerol    0.083%.. 2.5 milliGRAM(s) Nebulizer every 20 minutes  chlorhexidine 2% Cloths 1 Application(s) Topical <User Schedule>  chlorhexidine 4% Liquid 1 Application(s) Topical <User Schedule>  collagenase Ointment 1 Application(s) Topical daily  dextrose 5%. 1000 milliLiter(s) (50 mL/Hr) IV Continuous <Continuous>  dextrose 50% Injectable 12.5 Gram(s) IV Push once  dextrose 50% Injectable 25 Gram(s) IV Push once  dextrose 50% Injectable 25 Gram(s) IV Push once  insulin lispro (HumaLOG) corrective regimen sliding scale   SubCutaneous every 6 hours  meropenem  IVPB 500 milliGRAM(s) IV Intermittent every 24 hours  octreotide  Infusion 50 MICROgram(s)/Hr (10 mL/Hr) IV Continuous <Continuous>  pantoprazole Infusion 8 mG/Hr (10 mL/Hr) IV Continuous <Continuous>    --------------------------------------------------------------------------------------------------  Study Interpretation:    [[[Abbreviation Key:  PDR=alpha rhythm/posterior dominant rhythm. A-P=anterior posterior gradient.  Amplitude: ‘very low’:<20; ‘low’:20-50; ‘medium’:; ‘high’:>200uV.  Persistence for periodic/rhythmic patterns (% of epoch) ‘rare’:<1%; ‘occasional’:1-10%; ‘frequent’:10-50%; ‘abundant’:50-90%; ‘continuous’:>90%.  Persistence for sporadic discharges: ‘rare’:<1/hr; ‘occasional’:1/min-1/hr; ‘frequent’:>1/min; ‘abundant’:>1/10 sec.  GRDA=generalized rhythmic delta activity, LRDA=lateralized rhythmic delta activity, TIRDA=temporal intermittent rhythmic delta activity, FIRDA=frontal intermittent rhythmic activity. LPD=PLED=lateralized periodic discharges, GPD=generalized periodic discharges, BiPDs=BiPLEDs=bilateral independent periodic epileptiform discharges, SIRPID=stimulus induced rhythmic, periodic, or ictal appearing discharges.  Modifiers: +F=with fast component, +S=with spike component, +R=with rhythmic component.  S-B=burst suppression pattern.  Max=maximal. N1-drowsy, N2-stage II sleep, N3-slow wave sleep.  HV=hyperventilation, PS=photic stimulation]]]    FINDINGS:  The background was continuous, spontaneously variable and reactive.  During wakefulness, the posteriorly dominant rhythm was not seen.      There was more diffuse irregular theta and delta activity present.    Sleep Background:  Stage II sleep transients were not recorded.    Epileptiform Activity:   No epileptiform discharges were present.    Events:  No clinical events were recorded.  No seizures were recorded.    Activation Procedures:   -Hyperventilation was not performed.    -Photic stimulation was not performed.    Artifacts:  Intermittent myogenic and movement artifacts were noted.    ECG:  The heart rate on single channel ECG at baseline was predominantly near BPM = 60-70  -----------------------------------------------------------------------------------------------------    EEG Classification / Summary:  Abnormal EEG study  Moderate background slowing    -----------------------------------------------------------------------------------------------------  ***PRELIM REPORT  Clinical Impression:  Moderate diffuse or multifocal cerebral dysfunction, not specific as to etiology.  There were no epileptiform abnormalities recorded.      -------------------------------------------------------------------------------------------------------  Khang Karimi DO  Epilepsy Fellow, [facility] [facility] Epilepsy [place] KUN ROCHA [id number]     Study Date: 		12-27-19    ROUTINE EEG    Technical Information:			  		  Placement and Labeling of Electrodes:  The EEG was performed utilizing 20 channels referential EEG connections (coronal over temporal over parasagittal montage) using all standard 10-20 electrode placements with EKG.  Recording was at a sampling rate of 256 samples per second per channel.  Time synchronized digital video recording was done simultaneously with EEG recording.  A low light infrared camera was used for low light recording.  Martínez and seizure detection algorithms were utilized.    CSA Technical Component:  Quantitative EEG analysis using a separate Compressed Spectral Array (CSA) software package was conducted in real-time and run at bedside after set up by the technician, digitally displaying the power of electrographic frequencies included in the 1-30Hz band using a graded color map.  This data was reviewed and interpreted independently, and is reported in a separate section below.    --------------------------------------------------------------------------------------------------  History:  CC/ HPI Patient is a 64y old  Female who presents with a chief complaint of Acute hypoxemic respiratory failure, GIB (27 Dec 2019 12:59)    MEDICATIONS  (STANDING):  ALBUTerol    0.083%.. 2.5 milliGRAM(s) Nebulizer every 20 minutes  chlorhexidine 2% Cloths 1 Application(s) Topical <User Schedule>  chlorhexidine 4% Liquid 1 Application(s) Topical <User Schedule>  collagenase Ointment 1 Application(s) Topical daily  dextrose 5%. 1000 milliLiter(s) (50 mL/Hr) IV Continuous <Continuous>  dextrose 50% Injectable 12.5 Gram(s) IV Push once  dextrose 50% Injectable 25 Gram(s) IV Push once  dextrose 50% Injectable 25 Gram(s) IV Push once  insulin lispro (HumaLOG) corrective regimen sliding scale   SubCutaneous every 6 hours  meropenem  IVPB 500 milliGRAM(s) IV Intermittent every 24 hours  octreotide  Infusion 50 MICROgram(s)/Hr (10 mL/Hr) IV Continuous <Continuous>  pantoprazole Infusion 8 mG/Hr (10 mL/Hr) IV Continuous <Continuous>    --------------------------------------------------------------------------------------------------  Study Interpretation:    [[[Abbreviation Key:  PDR=alpha rhythm/posterior dominant rhythm. A-P=anterior posterior gradient.  Amplitude: ‘very low’:<20; ‘low’:20-50; ‘medium’:; ‘high’:>200uV.  Persistence for periodic/rhythmic patterns (% of epoch) ‘rare’:<1%; ‘occasional’:1-10%; ‘frequent’:10-50%; ‘abundant’:50-90%; ‘continuous’:>90%.  Persistence for sporadic discharges: ‘rare’:<1/hr; ‘occasional’:1/min-1/hr; ‘frequent’:>1/min; ‘abundant’:>1/10 sec.  GRDA=generalized rhythmic delta activity, LRDA=lateralized rhythmic delta activity, TIRDA=temporal intermittent rhythmic delta activity, FIRDA=frontal intermittent rhythmic activity. LPD=PLED=lateralized periodic discharges, GPD=generalized periodic discharges, BiPDs=BiPLEDs=bilateral independent periodic epileptiform discharges, SIRPID=stimulus induced rhythmic, periodic, or ictal appearing discharges.  Modifiers: +F=with fast component, +S=with spike component, +R=with rhythmic component.  S-B=burst suppression pattern.  Max=maximal. N1-drowsy, N2-stage II sleep, N3-slow wave sleep.  HV=hyperventilation, PS=photic stimulation]]]    FINDINGS:  The background was continuous, spontaneously variable and reactive.  During wakefulness, the posteriorly dominant rhythm was not seen.      There was more diffuse irregular theta and delta activity present.    Sleep Background:  Stage II sleep transients were not recorded.    Epileptiform Activity:   No epileptiform discharges were present.    Events:  No clinical events were recorded.  No seizures were recorded.    Activation Procedures:   -Hyperventilation was not performed.    -Photic stimulation was not performed.    Artifacts:  Intermittent myogenic and movement artifacts were noted.    ECG:  The heart rate on single channel ECG at baseline was predominantly near BPM = 60-70  -----------------------------------------------------------------------------------------------------    EEG Classification / Summary:  Abnormal EEG study  Moderate background slowing    -----------------------------------------------------------------------------------------------------  Clinical Impression:  Moderate diffuse or multifocal cerebral dysfunction, not specific as to etiology.  There were no epileptiform abnormalities recorded.      -------------------------------------------------------------------------------------------------------  Khang Karimi DO  Epilepsy Fellow, [facility] Epilepsy [place]

## 2019-12-27 NOTE — CONSULT NOTE ADULT - SUBJECTIVE AND OBJECTIVE BOX
ACUTE CARE SURGERY CONSULT    HPI: 64y Female with PMH of CHF, CKD, CAD, HTN, DM, cirrhosis, and recurrent GIB who was admitted yesterday to the MICU for hypoxemia and hypovolemic shock suspected to be from UGIB because of past history, melanotic stool, and Hgb of 5.8 on admission. Patient received 3U of PRBC. Patient was also found to the acidotic, have a UTI, encephalopathic, and KEITH over CKD. Had HD yesterday and today for hyperkalemia, encephalopathy and anuria overnight.     General surgery was consulted for an un-stageable sacral ulcer.     ROS: 10-system review is otherwise negative except HPI above.      PAST MEDICAL & SURGICAL HISTORY:  Legally blind in right eye, as defined in [place]  CAD in native artery  Chronic CHF  DM (diabetes mellitus), type 2  GERD (gastroesophageal reflux disease)  Hyperlipidemia  HTN (hypertension)  Congestive heart failure, unspecified HF chronicity, unspecified heart failure type  Insomnia  Retinal detachment  CAD (coronary artery disease)  Glaucoma  Urinary retention  Peripheral neuropathy  GERD (gastroesophageal reflux disease)  Depression  Diabetes  Hypertension, unspecified type  Herniated disc, cervical  PAD (peripheral artery disease)  Legally blind  History of peripheral vascular disease  History of retinal detachment  History of CEA (carotid endarterectomy): Right  Hyperlipidemia  Glaucoma  Hypertension  Diabetes  H/O carotid endarterectomy  S/P CABG (coronary artery bypass graft)  No significant past surgical history  S/P CABG x 1  After cataract  Uveitic glaucoma of both eyes  Detached retina  Atherosclerosis of right carotid artery   delivery delivered    FAMILY HISTORY:  No pertinent family history in first degree relatives  FH: type 2 diabetes: mom and dad    [] Family history not pertinent as reviewed with the patient and family    SOCIAL HISTORY:  Unable to obtain    ALLERGIES: NKA ACE inhibitors (Other (Mild))    HOME MEDICATIONS:  acetaminophen 325 mg oral tablet: 3 tab(s) orally every 6 hours, As Needed (27 Dec 2019 12:12)  Aquaphor topical ointment: Apply topically to B/L foot every day and evening shift for dry skin (27 Dec 2019 12:12)  Aspercreme with Lidocaine 4% topical cream: Apply topically to affected area once a day (1 patch to left posterior arm and 1 patch to lower back) for pain (27 Dec 2019 12:12)  atorvastatin 80 mg oral tablet: 1 tab(s) orally once a day (at bedtime) for HLD (27 Dec 2019 12:12)  budesonide 0.5 mg/2 mL inhalation suspension: 2 milliliter(s) inhaled 2 times a day for SOB (27 Dec 2019 12:12)  carvedilol 3.125 mg oral tablet: 1 tab(s) orally 2 times a day for hypertension (27 Dec 2019 12:12)  clopidogrel 75 mg oral tablet: 1 tab(s) orally once a day for blood clot prevention (27 Dec 2019 12:12)  Dakins Half Strength 0.25% topical solution: Apply to sacral wound topically every day and evening shift to pressure injury. Cleanse wound with dakins, pack with avani moistened with dakins, cover with foam and bg twice daily and as needed (27 Dec 2019 12:12)  ferrous sulfate 325 mg (65 mg elemental iron) oral tablet: 1 tab(s) orally once a day for anemia (27 Dec 2019 12:12)  gabapentin 300 mg oral capsule: 1 cap(s) orally 3 times a day for neuropathy (27 Dec 2019 12:12)  heparin 5000 units/mL injectable solution: 1 milliliter(s) subcutaneous every 8 hours for blood clot prevention (27 Dec 2019 12:12)  HumaLOG 100 units/mL subcutaneous solution: Inject SQ before meals for diabetes as per sliding scale if  101-150 = 2 units  150-200 = 4 units  201-250 = 6 units  251-300 = 8 units  301-350 = 10 units  351-400 = 12 units  BS&gt;401, Give 18 units and Call MD  BS&lt;60, Call MD (27 Dec 2019 12:12)  ipratropium-albuterol 0.5 mg-2.5 mg/3 mLinhalation solution: 3 milliliter(s) inhaled every 6 hours for SOB (27 Dec 2019 12:12)  isosorbide mononitrate 30 mg oral tablet, extended release: 1 tab(s) orally once a day for HTN (27 Dec 2019 12:12)  Lidoderm 5% topical film: Apply topically to lower back area and lower posterior arm once a day (27 Dec 2019 12:12)  Maalox Plus 225 mg-200 mg-25 mg/5 mL oral suspension: 30 milliliter(s) orally every 6 hours, As Needed - for heartburn (27 Dec 2019 12:12)  Melatonin 3 mg oral tablet: 1 tab(s) orally once a day (at bedtime) (27 Dec 2019 12:12)  menthol-zinc oxide 0.44%-20.625% topical ointment: Apply topically to inner buttocks topically every day and evening shift for skin excoriation (27 Dec 2019 12:12)  menthol-zinc oxide 0.44%-20.625% topical ointment: Apply topically to buttock wound 2 times a day (27 Dec 2019 12:12)  miconazole 2% topical cream: Apply topically to left breast wound  2 times a day (27 Dec 2019 12:12)  octreotide 20 mg intramuscular injection, extended release: 20 milligram(s) intramuscular every 28 days (27 Dec 2019 12:12)  pantoprazole 40 mg oral delayed release tablet: 1 tab(s) orally every 12 hours for GERD (27 Dec 2019 12:12)  traMADol 50 mg oral tablet: 1 tab(s) orally every 8 hours, As Needed - for moderate pain, for severe pain (27 Dec 2019 12:12)  Zosyn 3 g-0.375 g/50 mL intravenous solution: 3.375 gram(s) intravenous every 12 hours  started 12/21 x 7 days (27 Dec 2019 12:12)      --------------------------------------------------------------------------------------------    PHYSICAL EXAM:  General: Mild distress.   Neuro: Confused, responds to physical stimualtion  HEENT: NIPPS. R IJ shiley catheter, L subclavian central vein catheter.  Neck: Soft, supple  Cardio: RRR  Resp: Non invasive positive pressure ventilation.   GI/Abd: Soft, NT/ND, no rebound/guarding, no masses palpated  Back: 6x6cm un-stageable sacral ulcer with necrotic skin borders and fibrinous tissue overlying sacrum. Pus extracted from lower right aspect of wound. Cleaned and dressing applied.   Musculoskeletal: All 4 extremities moving spontaneously, no limitations  --------------------------------------------------------------------------------------------    LABS                 8.0    14.23  )----------(  182       ( 27 Dec 2019 06:09 )               23.2      137    |  98     |  81.0   ----------------------------<  144        ( 27 Dec 2019 06:09 )  5.0     |  19.0   |  4.20     Ca    8.1        ( 27 Dec 2019 06:09 )  Phos  7.0       ( 27 Dec 2019 06:09 )  Mg     2.1       ( 27 Dec 2019 06:09 )    TPro  7.0    /  Alb  2.4    /  TBili  1.0    /  DBili  x      /  AST  41     /  ALT  13     /  AlkPhos  117    ( 26 Dec 2019 18:42 )    LIVER FUNCTIONS - ( 26 Dec 2019 18:42 )  Alb: 2.4 g/dL / Pro: 7.0 g/dL / ALK PHOS: 117 U/L / ALT: 13 U/L / AST: 41 U/L / GGT: x               CAPILLARY BLOOD GLUCOSE          ABG - ( 26 Dec 2019 08:04 )  pH: 7.32  /  pCO2: 40    /  pO2: 146   / HCO3: 20    / Base Excess: -5.4  /  SaO2: 99                  --------------------------------------------------------------------------------------------  IMAGING  CT head: No evidence of stroke/hemorrhage

## 2019-12-27 NOTE — CONSULT NOTE ADULT - ASSESSMENT
ASSESSMENT: Patient is a 64y old female with hypovolemic shock for possible upper GI bleed, hypoxemia, blood loss anemia, metabolic acidosis, UTI, and KEITH on CKD consulted for an un-stageable sacral ulcer. Ulcer was debrided at bedside, pus extracted from lower right portion of wound.     PLAN:    - Daily dressing changes with santyl and bedside debridement.  - Possible OR debridement in the futures when patient is more stable.   - Surgery will follow patient.   - Rest of care per primary team.

## 2019-12-27 NOTE — PROGRESS NOTE ADULT - SUBJECTIVE AND OBJECTIVE BOX
[facility] DIVISION OF KIDNEY DISEASES AND HYPERTENSION -- FOLLOW UP NOTE  --------------------------------------------------------------------------------  Chief Complaint: KEITH on CKD; now requiring RRT    24 hour events/subjective:  s/p RIJ non tunneled HD catheter yesterday; tolerated first HD session with 0.5 kg UF  off bipap now; more awake        PAST HISTORY  --------------------------------------------------------------------------------  No significant changes to PMH, PSH, FHx, SHx, unless otherwise noted    ALLERGIES & MEDICATIONS  --------------------------------------------------------------------------------  Allergies    ACE inhibitors (Other (Mild))    Intolerances      Standing Inpatient Medications  ALBUTerol    0.083%.. 2.5 milliGRAM(s) Nebulizer every 20 minutes  chlorhexidine 2% Cloths 1 Application(s) Topical <User Schedule>  chlorhexidine 4% Liquid 1 Application(s) Topical <User Schedule>  dextrose 5%. 1000 milliLiter(s) IV Continuous <Continuous>  dextrose 50% Injectable 12.5 Gram(s) IV Push once  dextrose 50% Injectable 25 Gram(s) IV Push once  dextrose 50% Injectable 25 Gram(s) IV Push once  insulin lispro (HumaLOG) corrective regimen sliding scale   SubCutaneous every 6 hours  meropenem  IVPB 500 milliGRAM(s) IV Intermittent every 24 hours  octreotide  Infusion 50 MICROgram(s)/Hr IV Continuous <Continuous>  pantoprazole Infusion 8 mG/Hr IV Continuous <Continuous>    PRN Inpatient Medications  dextrose 40% Gel 15 Gram(s) Oral once PRN  glucagon  Injectable 1 milliGRAM(s) IntraMuscular once PRN  sodium chloride 0.9% lock flush 10 milliLiter(s) IV Push every 1 hour PRN      REVIEW OF SYSTEMS  --------------------------------------------------------------------------------  unable to obtain    VITALS/PHYSICAL EXAM  --------------------------------------------------------------------------------  T(C): 37.2 (12-27-19 @ 12:00), Max: 37.8 (12-26-19 @ 23:32)  HR: 82 (12-27-19 @ 12:00) (77 - 99)  BP: 128/57 (12-27-19 @ 12:00) (108/69 - 150/67)  RR: 12 (12-27-19 @ 12:00) (12 - 26)  SpO2: 99% (12-27-19 @ 12:00) (96% - 99%)  Wt(kg): --    Weight (kg): 97.5 (12-26-19 @ 04:52)      12-26-19 @ 07:01  -  12-27-19 @ 07:00  --------------------------------------------------------  IN: 950 mL / OUT: 1445 mL / NET: -495 mL    12-27-19 @ 07:01  -  12-27-19 @ 12:28  --------------------------------------------------------  IN: 200 mL / OUT: 45 mL / NET: 155 mL      Physical Exam:  	Gen:  elderly woman, awake  	HEENT: on NC  	Pulm: CTA B/L  	CV: RRR, S1S2; no rub  	Back: unable to examine  	Abd: +BS, soft, nontender  	: fontenot with minimal urine  	UE: Warm, no edema  	LE: Warm, no edema  	Neuro: awake, non communicative  	Psych: unable to assess  	Skin: Warm  	Vascular access: RIJ non tunneled HD catheter      LABS/STUDIES  --------------------------------------------------------------------------------              8.0    14.23 >-----------<  182      [12-27-19 @ 06:09]              23.2     137  |  98  |  81.0  ----------------------------<  144      [12-27-19 @ 06:09]  5.0   |  19.0  |  4.20        Ca     8.1     [12-27-19 @ 06:09]      Mg     2.1     [12-27-19 @ 06:09]      Phos  7.0     [12-27-19 @ 06:09]    TPro  7.0  /  Alb  2.4  /  TBili  1.0  /  DBili  x   /  AST  41  /  ALT  13  /  AlkPhos  117  [12-26-19 @ 18:42]    PT/INR: PT 15.5 , INR 1.34       [12-26-19 @ 00:47]  PTT: 34.3       [12-26-19 @ 00:47]    Troponin 0.52      [12-26-19 @ 00:47]        [12-26-19 @ 00:47]    Creatinine Trend:  SCr 4.20 [12-27 @ 06:09]  SCr 3.67 [12-26 @ 18:42]  SCr 5.03 [12-26 @ 09:03]  SCr 4.84 [12-26 @ 01:47]  SCr 5.05 [12-26 @ 00:47]    Urinalysis - [12-26-19 @ 02:27]      Color Red / Appearance Cloudy / SG 1.020 / pH 6.0      Gluc Negative / Ketone Trace  / Bili Negative / Urobili Negative       Blood Large / Protein 500 / Leuk Est Moderate / Nitrite Negative      RBC >50 / WBC 6-10 / Hyaline  / Gran  / Sq Epi  / Non Sq Epi Occasional / Bacteria Few      Iron 335, TIBC 419, %sat 80      [08-06-19 @ 14:55]  Ferritin 102      [08-06-19 @ 14:55]  PTH -- (Ca 9.1)      [05-30-19 @ 18:09]   107  PTH -- (Ca 7.9)      [01-17-19 @ 16:26]   175  PTH -- (Ca 8.1)      [01-16-19 @ 16:55]   148  Vitamin D (25OH) 31.1      [05-30-19 @ 18:17]  HbA1c 5.8      [12-26-19 @ 09:04]  TSH 1.10      [05-17-19 @ 12:21]    HBsAb 18.0      [12-26-19 @ 22:01]  HBsAg Nonreact      [12-26-19 @ 22:01]  HBcAb Nonreact      [12-26-19 @ 22:01]  HCV 0.20, Nonreact      [12-26-19 @ 22:01]

## 2019-12-27 NOTE — PROGRESS NOTE ADULT - ASSESSMENT
65 yo female with hx of CHFpEF, CAD, CKD, HTN, DM, cirrhosis, portal gastropathy, recurrent GI bleed, blind, sacral decub ulcer, presented to the ED from nursing home with encephalopathy, hypoxemia, hypotension, found to have anemia with Hb 5, possible UTI with sepsis, KEITH, hyperkalemia.      Plan     Anemia   Responded to blood transfusion, no signs of active bleeding at this time.    - protonix drip, octreotide, ceftriaxone    KEITH on CKD  Was briefly on dialysis in the past  Renal US showed no hydronephrosis  - Temporary HD cath placed, s/p HD 12/26    Acute on chronic diastolic CHF/ Acute respiratory failure  - weaning from bibpap to high flow    Possible UTI  - empiric abx pending cultures    Encephalopathy  Possibly due to sepsis.  Ammonia level in 50s.  - CT head, EEG 63 yo female with hx of CHFpEF, CAD, CKD, HTN, DM, cirrhosis, portal gastropathy, recurrent GI bleed, blind, sacral decub ulcer, presented to the ED from nursing home with encephalopathy, hypoxemia, hypotension, found to have anemia with Hb 5, possible UTI with sepsis, KEITH, hyperkalemia.      Plan   Anemia   Responded to blood transfusion, no signs of active bleeding at this time.    - protonix drip, octreotide, abx for SBP prophylaxis    KEITH on CKD  Was briefly on dialysis in the past  Renal US showed no hydronephrosis  - Temporary HD cath placed, s/p HD 12/26, 12/27  - will likely need long term HD    Acute on chronic diastolic CHF/ Acute respiratory failure  - weaning from bibpap to high flow    Sepsis  - right sided infiltrate on CXR, was on zosyn at nursing home, will broaden to meropenem x 5 days  - may also have infected decub ulcer, surgery called to see patient for debridement     Encephalopathy  Possibly due to sepsis.  Ammonia level in 50s.  - CT head negative, awaiting EEG

## 2019-12-27 NOTE — PROGRESS NOTE ADULT - SUBJECTIVE AND OBJECTIVE BOX
INTERVAL HPI/OVERNIGHT EVENTS: No major events    PHYSICAL EXAM:  encephalopathic, awake but doesn't follow commands, occasional myoclonic jerks  on high flow NC  reg rhythm  bilat air entry  abd soft, nontender  mild edema  good capillary refill     --------------------------------------------------------------------------------------------------------------------------------------------------------------  On Admission  19 (1d)  HPI:  65 y/o F with a h/o dCHF, moderate pHTN, CAD, CKD, HTN, DM, cirrhosis, portal gastropathy, recurrent GIB, blind, large sacral wound, presents to the ED from NH with AMS, hypoxemia, and hypotension. Patient had large melenotic BM in the ED. H&H: 5.8/17. Lactate: 2.7. KEITH on CKD. Hyperkalemic. Volume resuscitation began with 3u PRBC. Patient minimally responsive and moaning in pain. UA(+) with thick tan urine in fontenot. Developed progressive respiratory distress and hypoxemia requiring NIPPV. (26 Dec 2019 03:58)    PAST MEDICAL & SURGICAL HISTORY:  Legally blind in right eye, as defined in [place]  CAD in native artery  Chronic CHF  DM (diabetes mellitus), type 2  GERD (gastroesophageal reflux disease)  Hyperlipidemia  HTN (hypertension)  Congestive heart failure, unspecified HF chronicity, unspecified heart failure type  Insomnia  Retinal detachment  CAD (coronary artery disease)  Glaucoma  Urinary retention  Peripheral neuropathy  GERD (gastroesophageal reflux disease)  Depression  Diabetes  Hypertension, unspecified type  Herniated disc, cervical  PAD (peripheral artery disease)  Legally blind  History of peripheral vascular disease  History of retinal detachment  History of CEA (carotid endarterectomy): Right  Hyperlipidemia  Glaucoma  Hypertension  Diabetes  H/O carotid endarterectomy  S/P CABG (coronary artery bypass graft)  No significant past surgical history  S/P CABG x 1  After cataract  Uveitic glaucoma of both eyes  Detached retina  Atherosclerosis of right carotid artery   delivery delivered      Antimicrobial:  cefTRIAXone   IVPB 1000 milliGRAM(s) IV Intermittent every 24 hours  cefTRIAXone   IVPB        Cardiovascular:    Pulmonary:  ALBUTerol    0.083%.. 2.5 milliGRAM(s) Nebulizer every 20 minutes    Hematalogic:    Other:  chlorhexidine 2% Cloths 1 Application(s) Topical <User Schedule>  chlorhexidine 4% Liquid 1 Application(s) Topical <User Schedule>  dextrose 40% Gel 15 Gram(s) Oral once PRN  dextrose 5%. 1000 milliLiter(s) IV Continuous <Continuous>  dextrose 50% Injectable 12.5 Gram(s) IV Push once  dextrose 50% Injectable 25 Gram(s) IV Push once  dextrose 50% Injectable 25 Gram(s) IV Push once  glucagon  Injectable 1 milliGRAM(s) IntraMuscular once PRN  insulin lispro (HumaLOG) corrective regimen sliding scale   SubCutaneous every 6 hours  octreotide  Infusion 50 MICROgram(s)/Hr IV Continuous <Continuous>  pantoprazole Infusion 8 mG/Hr IV Continuous <Continuous>  sodium chloride 0.9% lock flush 10 milliLiter(s) IV Push every 1 hour PRN      Drug Dosing Weight  Height (cm): 165.1 (18 Aug 2019 10:55)  Weight (kg): 97.5 (26 Dec 2019 04:52)  BMI (kg/m2): 35.8 (26 Dec 2019 04:52)  BSA (m2): 2.04 (26 Dec 2019 04:52)    T(C): 37.6 (19 @ 08:00), Max: 37.8 (19 @ 23:32)  HR: 81 (19 @ 08:00)  BP: 114/65 (19 @ 08:00)  BP(mean): 83 (19 @ 08:00)  ABP: --  ABP(mean): --  RR: 15 (19 @ 08:00)  SpO2: 97% (19 @ 08:00)    ABG - ( 26 Dec 2019 08:04 )  pH, Arterial: 7.32  pH, Blood: x     /  pCO2: 40    /  pO2: 146   / HCO3: 20    / Base Excess: -5.4  /  SaO2: 99                     @ 07:01  -   @ 07:00  --------------------------------------------------------  IN: 950 mL / OUT: 1445 mL / NET: -495 mL              LABS:  CBC Full  -  ( 27 Dec 2019 06:09 )  WBC Count : 14.23 K/uL  RBC Count : 2.72 M/uL  Hemoglobin : 8.0 g/dL  Hematocrit : 23.2 %  Platelet Count - Automated : 182 K/uL  Mean Cell Volume : 85.3 fl  Mean Cell Hemoglobin : 29.4 pg  Mean Cell Hemoglobin Concentration : 34.5 gm/dL  Auto Neutrophil # : x  Auto Lymphocyte # : x  Auto Monocyte # : x  Auto Eosinophil # : x  Auto Basophil # : x  Auto Neutrophil % : x  Auto Lymphocyte % : x  Auto Monocyte % : x  Auto Eosinophil % : x  Auto Basophil % : x        137  |  98  |  81.0<H>  ----------------------------<  144<H>  5.0   |  19.0<L>  |  4.20<H>    Ca    8.1<L>      27 Dec 2019 06:09  Phos  7.0       Mg     2.1         TPro  7.0  /  Alb  2.4<L>  /  TBili  1.0  /  DBili  x   /  AST  41<H>  /  ALT  13  /  AlkPhos  117      PT/INR - ( 26 Dec 2019 00:47 )   PT: 15.5 sec;   INR: 1.34 ratio         PTT - ( 26 Dec 2019 00:47 )  PTT:34.3 sec  Urinalysis Basic - ( 26 Dec 2019 02:27 )    Color: Red / Appearance: Cloudy / S.020 / pH: x  Gluc: x / Ketone: Trace  / Bili: Negative / Urobili: Negative mg/dL   Blood: x / Protein: 500 mg/dL / Nitrite: Negative   Leuk Esterase: Moderate / RBC: >50 /HPF / WBC 6-10   Sq Epi: x / Non Sq Epi: Occasional / Bacteria: Few

## 2019-12-27 NOTE — CONSULT NOTE ADULT - ATTENDING COMMENTS
unstaged sacral decubitus ulcer   unroofed at bedside.  will need surgical debridement on this admission .

## 2019-12-27 NOTE — CONSULT NOTE ADULT - ASSESSMENT
64y old Female with a past medical history significant for dCHF, moderate pHTN, CAD s/p MADHAVI, CKD, HTN, DM, cirrhosis, portal gastropathy, recurrent GIB, blind, large sacral wound, presents to the ED from NH with AMS, hypoxemia, and hypotension found to have urosepsis. Had episode of melena in the ED. Hgb down to 5. Improve after transfusions. No active bleeding currently.   GI bleeding possibly secondary to portal gastropathy or right sided diverticula given history of + CTA  Currently on NIPPV    - NPO  - monitor cbc closely   - currently on ceftriaxone for UTI  - cont octreotide gtt  - cont PPI gtt  - Consider NM bleeding scan if active melena and patient is able to be transported    Thanks

## 2019-12-27 NOTE — CONSULT NOTE ADULT - SUBJECTIVE AND OBJECTIVE BOX
HISTORY OF PRESENT ILLNESS:  This is a 64y old Female with a past medical history significant for dCHF, moderate pHTN, CAD s/p MADHAVI, CKD, HTN, DM, cirrhosis, portal gastropathy, recurrent GIB, blind, large sacral wound, presents to the ED from NH with AMS, hypoxemia, and hypotension found to have urosepsis. Patient had melena in the ED. She had a hgb of 5.8. She was transfused with 3U PRBCs. Patient minimally responsive and moaning in pain.  Developed progressive respiratory distress and hypoxemia requiring NIPPV in the ICU. Has not had any overt bleeding in 24 hours.  She was admitted for melena in 2019. She had an EGD with portal gastropathy. She has had similar presentations multiple times since .   She had an EGD earlier in the year with gastritis.  She has had three colonoscopies this year with diverticulosis and one tubular adenoma removal.  She had a CTA in January or February showed active bleed in the area of the proximal transverse colon but colonoscopy at that time was unrevealing. She was taken off plavix earlier this year due to these recurrent bleeds but it is currently listed on her med list.     REVIEW OF SYSTEMS:  Constitutional:  No unintentional weight loss, fevers, chills or night sweats	  Eyes: No eye pain, redness, discharge, or proptosis  ENMT: No sore throat, ear pain, mouth sores, or swollen glands in the neck  Respiratory: No dyspnea, cough or wheezing  Cardiovascular: No chest pain, dyspnea on exertion, or orthopnea  Gastrointestinal:	Please see HPI  Genitourinary: No dysuria or hematuria  Neurological:	 No changes in sleep/wake cycle, convulsions, confusion, dizziness or lightheadedness  Psychiatric: No changes in personality or emotional problems   Hematology: No easy bruising   Endocrine: No hot or cold flashes or deepening of voice	  All other review of systems were completed and were otherwise negative save what is reported in the HPI.    PAST MEDICAL/SURGICAL HISTORY:  Legally blind in right eye, as defined in [place]  CAD in native artery  Chronic CHF  DM (diabetes mellitus), type 2  GERD (gastroesophageal reflux disease)  Hyperlipidemia  HTN (hypertension)  Congestive heart failure, unspecified HF chronicity, unspecified heart failure type  Insomnia  Retinal detachment  CAD (coronary artery disease)  Glaucoma  Urinary retention  Peripheral neuropathy  GERD (gastroesophageal reflux disease)  Depression  Diabetes  Hypertension, unspecified type  Herniated disc, cervical  PAD (peripheral artery disease)  Legally blind  History of peripheral vascular disease  History of retinal detachment  History of CEA (carotid endarterectomy): Right  Hyperlipidemia  Glaucoma  Hypertension  Diabetes  H/O carotid endarterectomy  S/P CABG (coronary artery bypass graft)  No significant past surgical history  S/P CABG x 1  After cataract  Uveitic glaucoma of both eyes  Detached retina  Atherosclerosis of right carotid artery   delivery delivered    SOCIAL HISTORY:  - ILLICIT DRUG USE: Denies    FAMILY HISTORY:  No known history of gastrointestinal or liver disease;  No pertinent family history in first degree relatives  FH: type 2 diabetes: mom and dad      HOME MEDICATIONS:  acetaminophen 325 mg oral tablet: 3 tab(s) orally every 6 hours, As Needed (26 Dec 2019 11:28)  Aquaphor topical ointment: Apply topically to B/L foot every day and evening shift for dry skin (26 Dec 2019 11:28)  Aspercreme with Lidocaine 4% topical cream: Apply topically to affected area once a day (1 patch to left posterior arm and 1 patch to lower back) for pain (26 Dec 2019 11:28)  atorvastatin 80 mg oral tablet: 1 tab(s) orally once a day (at bedtime) for HLD (26 Dec 2019 11:28)  budesonide 0.5 mg/2 mL inhalation suspension: 2 milliliter(s) inhaled 2 times a day for SOB (26 Dec 2019 11:28)  carvedilol 3.125 mg oral tablet: 1 tab(s) orally 2 times a day for hypertension (26 Dec 2019 11:28)  clopidogrel 75 mg oral tablet: 1 tab(s) orally once a day for blood clot prevention (26 Dec 2019 11:28)  Dakins Half Strength 0.25% topical solution: Apply to sacral wound topically every day and evening shift to pressure injury. Cleanse wound with dakins, pack with avani moistened with dakins, cover with foam and bg twice daily and as needed (26 Dec 2019 11:28)  ferrous sulfate 325 mg (65 mg elemental iron) oral tablet: 1 tab(s) orally once a day for anemia (26 Dec 2019 11:28)  gabapentin 300 mg oral capsule: 1 cap(s) orally 3 times a day for neuropathy (26 Dec 2019 11:28)  heparin 5000 units/mL injectable solution: 1 milliliter(s) subcutaneous every 8 hours for blood clot prevention (26 Dec 2019 11:28)  HumaLOG 100 units/mL subcutaneous solution: Inject SQ before meals for diabetes as per sliding scale if  101-150 = 2 units  150-200 = 4 units  201-250 = 6 units  251-300 = 8 units  301-350 = 10 units  351-400 = 12 units  BS&gt;401, Give 18 units and Call MD  BS&lt;60, Call MD (26 Dec 2019 11:28)  ipratropium-albuterol 0.5 mg-2.5 mg/3 mLinhalation solution: 3 milliliter(s) inhaled every 6 hours for SOB (26 Dec 2019 11:28)  isosorbide mononitrate 30 mg oral tablet, extended release: 1 tab(s) orally once a day for HTN (26 Dec 2019 11:28)  Maalox Plus 225 mg-200 mg-25 mg/5 mL oral suspension: 30 milliliter(s) orally every 6 hours, As Needed - for heartburn (26 Dec 2019 11:28)  Melatonin 3 mg oral tablet: 1 tab(s) orally once a day (at bedtime) (26 Dec 2019 11:28)  menthol-zinc oxide 0.44%-20.625% topical ointment: Apply topically to inner buttocks topically every day and evening shift for skin excoriation (26 Dec 2019 11:28)  octreotide 20 mg intramuscular injection, extended release: 20 milligram(s) intramuscular every 28 days (26 Dec 2019 11:28)  pantoprazole 40 mg oral delayed release tablet: 1 tab(s) orally every 12 hours for GERD (26 Dec 2019 11:28)  traMADol 50 mg oral tablet: 1 tab(s) orally every 8 hours, As Needed - for moderate pain, for severe pain (26 Dec 2019 11:28)    INPATIENT MEDICATIONS:  MEDICATIONS  (STANDING):  ALBUTerol    0.083%.. 2.5 milliGRAM(s) Nebulizer every 20 minutes  cefTRIAXone   IVPB 1000 milliGRAM(s) IV Intermittent every 24 hours  cefTRIAXone   IVPB      chlorhexidine 2% Cloths 1 Application(s) Topical <User Schedule>  chlorhexidine 4% Liquid 1 Application(s) Topical <User Schedule>  dextrose 5%. 1000 milliLiter(s) (50 mL/Hr) IV Continuous <Continuous>  dextrose 50% Injectable 12.5 Gram(s) IV Push once  dextrose 50% Injectable 25 Gram(s) IV Push once  dextrose 50% Injectable 25 Gram(s) IV Push once  insulin lispro (HumaLOG) corrective regimen sliding scale   SubCutaneous every 6 hours  octreotide  Infusion 50 MICROgram(s)/Hr (10 mL/Hr) IV Continuous <Continuous>  pantoprazole Infusion 8 mG/Hr (10 mL/Hr) IV Continuous <Continuous>    MEDICATIONS  (PRN):  dextrose 40% Gel 15 Gram(s) Oral once PRN Blood Glucose LESS THAN 70 milliGRAM(s)/deciLiter  glucagon  Injectable 1 milliGRAM(s) IntraMuscular once PRN Glucose <70 milliGRAM(s)/deciLiter  sodium chloride 0.9% lock flush 10 milliLiter(s) IV Push every 1 hour PRN Pre/post blood products, medications, blood draw, and to maintain line patency    ALLERGIES:  ACE inhibitors (Other (Mild))    VITAL SIGNS LAST 24 HOURS:  T(C): 36.6 (27 Dec 2019 07:51), Max: 37.8 (26 Dec 2019 23:32)  T(F): 97.9 (27 Dec 2019 07:51), Max: 100 (26 Dec 2019 23:32)  HR: 81 (27 Dec 2019 07:00) (77 - 99)  BP: 121/54 (27 Dec 2019 07:00) (108/69 - 150/67)  BP(mean): 78 (27 Dec 2019 07:00) (68 - 96)  RR: 17 (27 Dec 2019 07:00) (12 - 27)  SpO2: 97% (27 Dec 2019 07:00) (96% - 100%)      19 @ 07:  -  19 @ 07:00  --------------------------------------------------------  IN: 950 mL / OUT: 1445 mL / NET: -495 mL        19 @ 07:01  -  19 @ 07:00  --------------------------------------------------------  IN: 950 mL / OUT: 1445 mL / NET: -495 mL          PHYSICAL EXAM:  Constitutional: obese,  on NIPPV, moaning  Eyes: Sclerae anicteric, + rt eye cataract conjunctivae normal  ENMT: Mucus membranes moist, no oropharyngeal thrush noted  Neck: No thyroid nodules appreciated, no significant cervical or supraclavicular lymphadenopathy  Respiratory: anteriorly, clear to auscultation  Cardiovascular: Regular rate and rhythm  Gastrointestinal: Soft, nontender, nondistended, normoactive bowel sounds  Extremities: RLE toe amputations, No clubbing, cyanosis or edema  Neurological: not alert or oriented  Skin: No jaundice  Lymph Nodes: No significant lymphadenopathy  Musculoskeletal: No significant peripheral atrophy  Psychiatric: Affect and mood appropriate      LABS:                        8.0    14.23 )-----------( 182      ( 27 Dec 2019 06:09 )             23.2     PT/INR - ( 26 Dec 2019 00:47 )   PT: 15.5 sec;   INR: 1.34 ratio         PTT - ( 26 Dec 2019 00:47 )  PTT:34.3 sec      137  |  98  |  81.0<H>  ----------------------------<  144<H>  5.0   |  19.0<L>  |  4.20<H>    Ca    8.1<L>      27 Dec 2019 06:09  Phos  7.0       Mg     2.1         TPro  7.0  /  Alb  2.4<L>  /  TBili  1.0  /  DBili  x   /  AST  41<H>  /  ALT  13  /  AlkPhos  117      LIVER FUNCTIONS - ( 26 Dec 2019 18:42 )  Alb: 2.4 g/dL / Pro: 7.0 g/dL / ALK PHOS: 117 U/L / ALT: 13 U/L / AST: 41 U/L / GGT: x           Urinalysis Basic - ( 26 Dec 2019 02:27 )    Color: Red / Appearance: Cloudy / S.020 / pH: x  Gluc: x / Ketone: Trace  / Bili: Negative / Urobili: Negative mg/dL   Blood: x / Protein: 500 mg/dL / Nitrite: Negative   Leuk Esterase: Moderate / RBC: >50 /HPF / WBC 6-10   Sq Epi: x / Non Sq Epi: Occasional / Bacteria: Few    IMAGING:  < from: US Abdomen Complete (19 @ 09:41) >  FINDINGS:    Liver: Slightly lobular liver contour. Slightly coarsened echotexture.   Patent main portal vein with proper direction of flow.    Bile ducts: Normal caliber. Common bile duct measures 2 mm.     Gallbladder: No gallstones. Minimal wall thickening which can be   secondary to underlying liver disease.        Pancreas: Visualized portions are within normal limits.    Spleen: 9.1 cm. Within normal limits.    Right kidney: 10.9 cm. No hydronephrosis.    Left kidney: 11.2 cm.No hydronephrosis.    Ascites: There is a small amount of perihepatic ascites. Note is made of   a right pleural effusion as seen on the prior CT and a small left pleural   effusion, larger on the right.    Aorta and IVC: Visualized portions are within normal limits.    IMPRESSION:   Slightly lobular surface contour of the liver which can be seen in the   setting of cirrhosis.  Slightly coarsened echotexture of the liver which likely reflects   underlying hepatocellular disease.  Small amount of perihepatic ascites and bilateral pleural effusions,   larger on the right.    < end of copied text >    < from: CT Angio Abdomen and Pelvis w/ IV Cont (19 @ 13:23) >  FINDINGS:    LOWER CHEST: Moderate right pleural effusion and probable secondary   compression atelectasis involving the right lower lobe. Coronary artery   calcification. Prior sternotomy. Mild to moderate cardiomegaly.    LIVER: Probable transient diminished density of the right hepatic lobe.   Nonspecific periportal edema.  BILE DUCTS: Normal caliber.  GALLBLADDER: Within normal limits.  SPLEEN: Within normal limits.  PANCREAS: Within normal limits.  ADRENALS: Within normal limits.  KIDNEYS/URETERS: 2 cm sized cyst lower left kidney. Probable renal   vascular calcifications bilaterally. Definite exclusion of a few punctate   nonobstructing right renal calculi cannot be made.    BLADDER: Within normal limits.  REPRODUCTIVE ORGANS: Unremarkable uterus. No adnexal mass.    BOWEL: No bowel obstruction. Appendix is normal. No arterial phase   extravasation of contrast or pooling of the contrast on delayed phase   imaging to suggest GI bleed.  PERITONEUM: No ascites.  VESSELS: Atherosclerotic changes.  RETROPERITONEUM/LYMPH NODES: No lymphadenopathy.    ABDOMINAL WALL: Haziness and stranding involving the subcutaneous soft   tissues indicating anasarca.  BONES: Within normal limits.    IMPRESSION:     No CT evidence of GI bleed at this time.    Right pleural effusion and probable anasarca.    < end of copied text >

## 2019-12-28 LAB
ANION GAP SERPL CALC-SCNC: 21 MMOL/L — HIGH (ref 5–17)
BUN SERPL-MCNC: 58 MG/DL — HIGH (ref 8–20)
CALCIUM SERPL-MCNC: 7.9 MG/DL — LOW (ref 8.6–10.2)
CHLORIDE SERPL-SCNC: 98 MMOL/L — SIGNIFICANT CHANGE UP (ref 98–107)
CO2 SERPL-SCNC: 18 MMOL/L — LOW (ref 22–29)
CREAT SERPL-MCNC: 3.48 MG/DL — HIGH (ref 0.5–1.3)
GLUCOSE BLDC GLUCOMTR-MCNC: 100 MG/DL — HIGH (ref 70–99)
GLUCOSE BLDC GLUCOMTR-MCNC: 146 MG/DL — HIGH (ref 70–99)
GLUCOSE BLDC GLUCOMTR-MCNC: 151 MG/DL — HIGH (ref 70–99)
GLUCOSE BLDC GLUCOMTR-MCNC: 159 MG/DL — HIGH (ref 70–99)
GLUCOSE BLDC GLUCOMTR-MCNC: 87 MG/DL — SIGNIFICANT CHANGE UP (ref 70–99)
GLUCOSE SERPL-MCNC: 160 MG/DL — HIGH (ref 70–115)
HCT VFR BLD CALC: 23.6 % — LOW (ref 34.5–45)
HCT VFR BLD CALC: 27.1 % — LOW (ref 34.5–45)
HGB BLD-MCNC: 7.9 G/DL — LOW (ref 11.5–15.5)
HGB BLD-MCNC: 9.5 G/DL — LOW (ref 11.5–15.5)
MAGNESIUM SERPL-MCNC: 2 MG/DL — SIGNIFICANT CHANGE UP (ref 1.6–2.6)
MCHC RBC-ENTMCNC: 28.2 PG — SIGNIFICANT CHANGE UP (ref 27–34)
MCHC RBC-ENTMCNC: 30 PG — SIGNIFICANT CHANGE UP (ref 27–34)
MCHC RBC-ENTMCNC: 33.5 GM/DL — SIGNIFICANT CHANGE UP (ref 32–36)
MCHC RBC-ENTMCNC: 35.1 GM/DL — SIGNIFICANT CHANGE UP (ref 32–36)
MCV RBC AUTO: 84.3 FL — SIGNIFICANT CHANGE UP (ref 80–100)
MCV RBC AUTO: 85.5 FL — SIGNIFICANT CHANGE UP (ref 80–100)
PHOSPHATE SERPL-MCNC: 6.2 MG/DL — HIGH (ref 2.4–4.7)
PLATELET # BLD AUTO: 171 K/UL — SIGNIFICANT CHANGE UP (ref 150–400)
PLATELET # BLD AUTO: 176 K/UL — SIGNIFICANT CHANGE UP (ref 150–400)
POTASSIUM SERPL-MCNC: 4.5 MMOL/L — SIGNIFICANT CHANGE UP (ref 3.5–5.3)
POTASSIUM SERPL-SCNC: 4.5 MMOL/L — SIGNIFICANT CHANGE UP (ref 3.5–5.3)
RBC # BLD: 2.8 M/UL — LOW (ref 3.8–5.2)
RBC # BLD: 3.17 M/UL — LOW (ref 3.8–5.2)
RBC # FLD: 18.2 % — HIGH (ref 10.3–14.5)
RBC # FLD: 18.6 % — HIGH (ref 10.3–14.5)
SODIUM SERPL-SCNC: 137 MMOL/L — SIGNIFICANT CHANGE UP (ref 135–145)
WBC # BLD: 14.55 K/UL — HIGH (ref 3.8–10.5)
WBC # BLD: 14.93 K/UL — HIGH (ref 3.8–10.5)
WBC # FLD AUTO: 14.55 K/UL — HIGH (ref 3.8–10.5)
WBC # FLD AUTO: 14.93 K/UL — HIGH (ref 3.8–10.5)

## 2019-12-28 PROCEDURE — 90937 HEMODIALYSIS REPEATED EVAL: CPT

## 2019-12-28 PROCEDURE — 99233 SBSQ HOSP IP/OBS HIGH 50: CPT

## 2019-12-28 PROCEDURE — 95951: CPT | Mod: 26

## 2019-12-28 PROCEDURE — 99223 1ST HOSP IP/OBS HIGH 75: CPT

## 2019-12-28 PROCEDURE — 71045 X-RAY EXAM CHEST 1 VIEW: CPT | Mod: 26

## 2019-12-28 RX ORDER — IPRATROPIUM/ALBUTEROL SULFATE 18-103MCG
3 AEROSOL WITH ADAPTER (GRAM) INHALATION EVERY 6 HOURS
Refills: 0 | Status: DISCONTINUED | OUTPATIENT
Start: 2019-12-28 | End: 2020-01-23

## 2019-12-28 RX ORDER — TRANEXAMIC ACID 100 MG/ML
1000 INJECTION, SOLUTION INTRAVENOUS ONCE
Refills: 0 | Status: DISCONTINUED | OUTPATIENT
Start: 2019-12-28 | End: 2019-12-28

## 2019-12-28 RX ORDER — PANTOPRAZOLE SODIUM 20 MG/1
40 TABLET, DELAYED RELEASE ORAL
Refills: 0 | Status: DISCONTINUED | OUTPATIENT
Start: 2019-12-28 | End: 2019-12-31

## 2019-12-28 RX ORDER — ERYTHROPOIETIN 10000 [IU]/ML
10000 INJECTION, SOLUTION INTRAVENOUS; SUBCUTANEOUS
Refills: 0 | Status: DISCONTINUED | OUTPATIENT
Start: 2019-12-28 | End: 2020-01-09

## 2019-12-28 RX ORDER — LACTULOSE 10 G/15ML
20 SOLUTION ORAL
Refills: 0 | Status: DISCONTINUED | OUTPATIENT
Start: 2019-12-28 | End: 2019-12-30

## 2019-12-28 RX ADMIN — Medication 3 MILLILITER(S): at 08:34

## 2019-12-28 RX ADMIN — OCTREOTIDE ACETATE 10 MICROGRAM(S)/HR: 200 INJECTION, SOLUTION INTRAVENOUS; SUBCUTANEOUS at 05:13

## 2019-12-28 RX ADMIN — Medication 3 MILLILITER(S): at 20:32

## 2019-12-28 RX ADMIN — Medication 3 MILLILITER(S): at 04:52

## 2019-12-28 RX ADMIN — ERYTHROPOIETIN 10000 UNIT(S): 10000 INJECTION, SOLUTION INTRAVENOUS; SUBCUTANEOUS at 15:31

## 2019-12-28 RX ADMIN — Medication 2: at 05:13

## 2019-12-28 RX ADMIN — LACTULOSE 20 GRAM(S): 10 SOLUTION ORAL at 18:37

## 2019-12-28 RX ADMIN — CHLORHEXIDINE GLUCONATE 1 APPLICATION(S): 213 SOLUTION TOPICAL at 03:49

## 2019-12-28 RX ADMIN — CHLORHEXIDINE GLUCONATE 1 APPLICATION(S): 213 SOLUTION TOPICAL at 03:50

## 2019-12-28 RX ADMIN — Medication 2: at 11:28

## 2019-12-28 RX ADMIN — PANTOPRAZOLE SODIUM 40 MILLIGRAM(S): 20 TABLET, DELAYED RELEASE ORAL at 17:01

## 2019-12-28 RX ADMIN — OCTREOTIDE ACETATE 10 MICROGRAM(S)/HR: 200 INJECTION, SOLUTION INTRAVENOUS; SUBCUTANEOUS at 16:49

## 2019-12-28 RX ADMIN — MEROPENEM 100 MILLIGRAM(S): 1 INJECTION INTRAVENOUS at 14:57

## 2019-12-28 RX ADMIN — Medication 3 MILLILITER(S): at 15:25

## 2019-12-28 RX ADMIN — Medication 1 APPLICATION(S): at 03:50

## 2019-12-28 RX ADMIN — ALBUTEROL 2.5 MILLIGRAM(S): 90 AEROSOL, METERED ORAL at 08:31

## 2019-12-28 RX ADMIN — PANTOPRAZOLE SODIUM 10 MG/HR: 20 TABLET, DELAYED RELEASE ORAL at 05:13

## 2019-12-28 NOTE — DIETITIAN INITIAL EVALUATION ADULT. - OTHER INFO
Pt with hypovolemic shock for possible upper GI bleed, hypoxemia, blood loss anemia, metabolic acidosis, UTI, KEITH on CKD with an un-stageable sacral ulcer - s/p bedside debridement, may require additional debridement in the future.  Aware pt on NCS diet per NH record.  Pt currently on HFNC 30%, sleeping at this time- does not follow commands per RN notes.  Pt remains NPO.  Unable to obtain full nutrition hx at this time.

## 2019-12-28 NOTE — PROGRESS NOTE ADULT - ASSESSMENT
Patient with ESRD, small bowel AVMs, portal gastropathy, sacral decubti and anemia. Continues to have melena. Continue PPI infusion and octreotide infusion. If continue to drop hct, will need endoscopic evaluation. She had multiple GI procedures before this.

## 2019-12-28 NOTE — PROGRESS NOTE ADULT - ASSESSMENT
64yoF with hypovolemic shock for possible upper GI bleed, hypoxemia, blood loss anemia, metabolic acidosis, UTI, KEITH on CKD with an un-stageable sacral ulcer - s/p bedside debridement  - daily dressing changes with santyl and concurrent bedside debridement per surgery  - possible OR debridement in future when pt more stable

## 2019-12-28 NOTE — PROGRESS NOTE ADULT - SUBJECTIVE AND OBJECTIVE BOX
HPI/OVERNIGHT EVENTS:  No acute events overnight. Pt received an EEG overnight. s/p ulcer debrided at bedside with pus being extracted from the lower R portion of wound. Denies f/c/n/v/cp/sob.    MEDICATIONS  (STANDING):  ALBUTerol    0.083%.. 2.5 milliGRAM(s) Nebulizer every 20 minutes  chlorhexidine 2% Cloths 1 Application(s) Topical <User Schedule>  chlorhexidine 4% Liquid 1 Application(s) Topical <User Schedule>  collagenase Ointment 1 Application(s) Topical daily  dextrose 5%. 1000 milliLiter(s) (50 mL/Hr) IV Continuous <Continuous>  dextrose 50% Injectable 12.5 Gram(s) IV Push once  dextrose 50% Injectable 25 Gram(s) IV Push once  dextrose 50% Injectable 25 Gram(s) IV Push once  insulin lispro (HumaLOG) corrective regimen sliding scale   SubCutaneous every 6 hours  meropenem  IVPB 500 milliGRAM(s) IV Intermittent every 24 hours  octreotide  Infusion 50 MICROgram(s)/Hr (10 mL/Hr) IV Continuous <Continuous>  pantoprazole Infusion 8 mG/Hr (10 mL/Hr) IV Continuous <Continuous>    MEDICATIONS  (PRN):  dextrose 40% Gel 15 Gram(s) Oral once PRN Blood Glucose LESS THAN 70 milliGRAM(s)/deciLiter  glucagon  Injectable 1 milliGRAM(s) IntraMuscular once PRN Glucose <70 milliGRAM(s)/deciLiter  sodium chloride 0.9% lock flush 10 milliLiter(s) IV Push every 1 hour PRN Pre/post blood products, medications, blood draw, and to maintain line patency      Vital Signs Last 24 Hrs  T(C): 37 (27 Dec 2019 23:52), Max: 37.7 (27 Dec 2019 09:55)  T(F): 98.6 (27 Dec 2019 23:52), Max: 99.9 (27 Dec 2019 09:55)  HR: 85 (28 Dec 2019 01:00) (77 - 103)  BP: 98/63 (28 Dec 2019 01:00) (90/55 - 150/57)  BP(mean): 75 (28 Dec 2019 01:00) (66 - 96)  RR: 15 (28 Dec 2019 01:00) (12 - 26)  SpO2: 98% (28 Dec 2019 01:00) (93% - 99%)    gen: nad  heent: eeg nodes in place  cv: rrr  resp: non-invasive positive pressure ventilation  gi: soft, nd, nttp  back: 6x6 sacral ulcer with necrotic skin borders w/p fibrinous tissue overlying sacrum.     I&O's Detail    26 Dec 2019 07:01  -  27 Dec 2019 07:00  --------------------------------------------------------  IN:    octreotide  Infusion: 210 mL    Other: 500 mL    pantoprazole Infusion: 240 mL  Total IN: 950 mL    OUT:    Indwelling Catheter - Urethral: 445 mL    Other: 1000 mL  Total OUT: 1445 mL    Total NET: -495 mL      27 Dec 2019 07:01  -  28 Dec 2019 01:57  --------------------------------------------------------  IN:    octreotide  Infusion: 180 mL    pantoprazole Infusion: 180 mL    Solution: 100 mL    Solution: 100 mL  Total IN: 560 mL    OUT:    Indwelling Catheter - Urethral: 55 mL    Other: 1000 mL  Total OUT: 1055 mL    Total NET: -495 mL          LABS:                        7.9    14.85 )-----------( 173      ( 27 Dec 2019 18:05 )             23.1         137  |  98  |  81.0<H>  ----------------------------<  144<H>  5.0   |  19.0<L>  |  4.20<H>    Ca    8.1<L>      27 Dec 2019 06:09  Phos  7.0       Mg     2.1         TPro  7.0  /  Alb  2.4<L>  /  TBili  1.0  /  DBili  x   /  AST  41<H>  /  ALT  13  /  AlkPhos  117        Urinalysis Basic - ( 26 Dec 2019 02:27 )    Color: Red / Appearance: Cloudy / S.020 / pH: x  Gluc: x / Ketone: Trace  / Bili: Negative / Urobili: Negative mg/dL   Blood: x / Protein: 500 mg/dL / Nitrite: Negative   Leuk Esterase: Moderate / RBC: >50 /HPF / WBC 6-10   Sq Epi: x / Non Sq Epi: Occasional / Bacteria: Few

## 2019-12-28 NOTE — PROGRESS NOTE ADULT - SUBJECTIVE AND OBJECTIVE BOX
INTERVAL HPI/OVERNIGHT EVENTS: No major events    PHYSICAL EXAM:  encephalopathic, awake but doesn't follow commands, occasional myoclonic jerks  on high flow NC  reg rhythm  bilat air entry  abd soft, nontender  mild edema  good capillary refill     family updated at bedside  --------------------------------------------------------------------------------------------------------------------------------------------------------------  On Admission  19 (2d)  HPI:  65 y/o F with a h/o dCHF, moderate pHTN, CAD, CKD, HTN, DM, cirrhosis, portal gastropathy, recurrent GIB, blind, large sacral wound, presents to the ED from NH with AMS, hypoxemia, and hypotension. Patient had large melenotic BM in the ED. H&H: 5.8/17. Lactate: 2.7. KEITH on CKD. Hyperkalemic. Volume resuscitation began with 3u PRBC. Patient minimally responsive and moaning in pain. UA(+) with thick tan urine in fontenot. Developed progressive respiratory distress and hypoxemia requiring NIPPV. (26 Dec 2019 03:58)    PAST MEDICAL & SURGICAL HISTORY:  Legally blind in right eye, as defined in [place]  CAD in native artery  Chronic CHF  DM (diabetes mellitus), type 2  GERD (gastroesophageal reflux disease)  Hyperlipidemia  HTN (hypertension)  Congestive heart failure, unspecified HF chronicity, unspecified heart failure type  Insomnia  Retinal detachment  CAD (coronary artery disease)  Glaucoma  Urinary retention  Peripheral neuropathy  GERD (gastroesophageal reflux disease)  Depression  Diabetes  Hypertension, unspecified type  Herniated disc, cervical  PAD (peripheral artery disease)  Legally blind  History of peripheral vascular disease  History of retinal detachment  History of CEA (carotid endarterectomy): Right  Hyperlipidemia  Glaucoma  Hypertension  Diabetes  H/O carotid endarterectomy  S/P CABG (coronary artery bypass graft)  No significant past surgical history  S/P CABG x 1  After cataract  Uveitic glaucoma of both eyes  Detached retina  Atherosclerosis of right carotid artery   delivery delivered      Antimicrobial:  meropenem  IVPB 500 milliGRAM(s) IV Intermittent every 24 hours    Cardiovascular:    Pulmonary:  albuterol/ipratropium for Nebulization. 3 milliLiter(s) Nebulizer every 6 hours    Hematalogic:    Other:  chlorhexidine 2% Cloths 1 Application(s) Topical <User Schedule>  chlorhexidine 4% Liquid 1 Application(s) Topical <User Schedule>  collagenase Ointment 1 Application(s) Topical daily  dextrose 40% Gel 15 Gram(s) Oral once PRN  dextrose 5%. 1000 milliLiter(s) IV Continuous <Continuous>  dextrose 50% Injectable 12.5 Gram(s) IV Push once  dextrose 50% Injectable 25 Gram(s) IV Push once  dextrose 50% Injectable 25 Gram(s) IV Push once  glucagon  Injectable 1 milliGRAM(s) IntraMuscular once PRN  insulin lispro (HumaLOG) corrective regimen sliding scale   SubCutaneous every 6 hours  octreotide  Infusion 50 MICROgram(s)/Hr IV Continuous <Continuous>  pantoprazole Infusion 8 mG/Hr IV Continuous <Continuous>  sodium chloride 0.9% lock flush 10 milliLiter(s) IV Push every 1 hour PRN      Drug Dosing Weight  Height (cm): 165.1 (18 Aug 2019 10:55)  Weight (kg): 97.5 (26 Dec 2019 04:52)  BMI (kg/m2): 35.8 (26 Dec 2019 04:52)  BSA (m2): 2.04 (26 Dec 2019 04:52)    T(C): 36.8 (19 @ 11:37), Max: 37.7 (19 @ 18:00)  HR: 84 (19 @ 13:00)  BP: 139/59 (19 @ 13:00)  BP(mean): 93 (19 @ 13:00)  ABP: --  ABP(mean): --  RR: 23 (19 @ 13:00)  SpO2: 94% (19 @ 13:00)           @ 07:01  -   @ 07:00  --------------------------------------------------------  IN: 680 mL / OUT: 1055 mL / NET: -375 mL              LABS:  CBC Full  -  ( 28 Dec 2019 05:01 )  WBC Count : 14.55 K/uL  RBC Count : 2.80 M/uL  Hemoglobin : 7.9 g/dL  Hematocrit : 23.6 %  Platelet Count - Automated : 176 K/uL  Mean Cell Volume : 84.3 fl  Mean Cell Hemoglobin : 28.2 pg  Mean Cell Hemoglobin Concentration : 33.5 gm/dL  Auto Neutrophil # : x  Auto Lymphocyte # : x  Auto Monocyte # : x  Auto Eosinophil # : x  Auto Basophil # : x  Auto Neutrophil % : x  Auto Lymphocyte % : x  Auto Monocyte % : x  Auto Eosinophil % : x  Auto Basophil % : x        137  |  98  |  58.0<H>  ----------------------------<  160<H>  4.5   |  18.0<L>  |  3.48<H>    Ca    7.9<L>      28 Dec 2019 05:01  Phos  6.2       Mg     2.0         TPro  7.0  /  Alb  2.4<L>  /  TBili  1.0  /  DBili  x   /  AST  41<H>  /  ALT  13  /  AlkPhos  117          Culture Results:   Culture grew 3 or more types of organisms which indicate  collection contamination; consider recollection only if clinically  indicated.  ,  TYPE: (C=Critical, N=Notification, A=Abnormal) n  TESTS:  _ urine contamination  DATE/TIME CALLED: _ 2019 11:59:55  CALLED TO: Anais meza rn  READ BACK (2 Patient Identifiers)(Y/N): _ y  READ BACK VALUES (Y/N): _ y  CALLED BY: Anais sanchez ( @ 02:26)  Culture Results:   No growth at 48 hours ( @ 00:45)  Culture Results:   No growth at 48 hours ( @ 00:45)

## 2019-12-28 NOTE — PROGRESS NOTE ADULT - ASSESSMENT
63 yo  AA  female with hx of CHFpEF, CAD, CKD, HTN, DM, cirrhosis, portal gastropathy, recurrent GI bleed, blind, sacral decub ulcer, presented to the ED from nursing home with encephalopathy, hypoxemia, hypotension, found to have anemia with Hb 5 gms., , possible UTI with sepsis, KEITH, hyperkalemia.      Plan :  Anemia   Responded to blood transfusion, no signs of active bleeding at this time.    - On Protonix octreotide, Antibiotics  for SBP prophylaxis    KEITH on CKD > ? ESRD,   Was briefly on dialysis in the past  Renal US showed no hydronephrosis    Acute on chronic diastolic CHF/ Acute respiratory failure  - No  longer requiring bipap    Sepsis ( Pneumonia, Decubitus ulcer )     Encephalopathy - Etiology ?    - CT head negative, EEG without seizures ,      D/W Dr. Anne,

## 2019-12-28 NOTE — EEG REPORT - NS EEG TEXT BOX
KUN ROCHA [id number]     Study Date: 		12-27-19 15:06 to 12-28-19 08:00   Duration: 16:54hrs      Continuous EEG    --------------------------------------------------------------------------------------------------  History:  CC/ HPI Patient is a 64y old  Female who presents with a chief complaint of Acute hypoxemic respiratory failure, GIB (27 Dec 2019 12:59)    MEDICATIONS  (STANDING):  ALBUTerol    0.083%.. 2.5 milliGRAM(s) Nebulizer every 20 minutes  chlorhexidine 2% Cloths 1 Application(s) Topical <User Schedule>  chlorhexidine 4% Liquid 1 Application(s) Topical <User Schedule>  collagenase Ointment 1 Application(s) Topical daily  dextrose 5%. 1000 milliLiter(s) (50 mL/Hr) IV Continuous <Continuous>  dextrose 50% Injectable 12.5 Gram(s) IV Push once  dextrose 50% Injectable 25 Gram(s) IV Push once  dextrose 50% Injectable 25 Gram(s) IV Push once  insulin lispro (HumaLOG) corrective regimen sliding scale   SubCutaneous every 6 hours  meropenem  IVPB 500 milliGRAM(s) IV Intermittent every 24 hours  octreotide  Infusion 50 MICROgram(s)/Hr (10 mL/Hr) IV Continuous <Continuous>  pantoprazole Infusion 8 mG/Hr (10 mL/Hr) IV Continuous <Continuous>    --------------------------------------------------------------------------------------------------  Study Interpretation:    [[[Abbreviation Key:  PDR=alpha rhythm/posterior dominant rhythm. A-P=anterior posterior gradient.  Amplitude: ‘very low’:<20; ‘low’:20-50; ‘medium’:; ‘high’:>200uV.  Persistence for periodic/rhythmic patterns (% of epoch) ‘rare’:<1%; ‘occasional’:1-10%; ‘frequent’:10-50%; ‘abundant’:50-90%; ‘continuous’:>90%.  Persistence for sporadic discharges: ‘rare’:<1/hr; ‘occasional’:1/min-1/hr; ‘frequent’:>1/min; ‘abundant’:>1/10 sec.  GRDA=generalized rhythmic delta activity, LRDA=lateralized rhythmic delta activity, TIRDA=temporal intermittent rhythmic delta activity, FIRDA=frontal intermittent rhythmic activity. LPD=PLED=lateralized periodic discharges, GPD=generalized periodic discharges, BiPDs=BiPLEDs=bilateral independent periodic epileptiform discharges, SIRPID=stimulus induced rhythmic, periodic, or ictal appearing discharges.  Modifiers: +F=with fast component, +S=with spike component, +R=with rhythmic component.  S-B=burst suppression pattern.  Max=maximal. N1-drowsy, N2-stage II sleep, N3-slow wave sleep.  HV=hyperventilation, PS=photic stimulation]]]    FINDINGS:  The background was continuous, spontaneously variable and reactive.  During wakefulness, the posteriorly dominant rhythm was not seen.      There was more diffuse irregular theta and delta activity present at times sharply contoured of unclear significance.    Sleep Background:  Stage II sleep transients were not recorded.    Epileptiform Activity:   No epileptiform discharges were present.    Events:  No clinical events were recorded.  No seizures were recorded.    Activation Procedures:   -Hyperventilation was not performed.    -Photic stimulation was not performed.    Artifacts:  Intermittent myogenic and movement artifacts were noted.    ECG:  The heart rate on single channel ECG at baseline was predominantly near BPM = 60-70  -----------------------------------------------------------------------------------------------------    EEG Classification / Summary:  Abnormal EEG study  Moderate background slowing    -----------------------------------------------------------------------------------------------------  Clinical Impression:  Moderate diffuse or multifocal cerebral dysfunction, not specific as to etiology.  There were no epileptiform abnormalities recorded.      -------------------------------------------------------------------------------------------------------  Khang Karimi DO  Epilepsy Fellow, [facility] [place]

## 2019-12-28 NOTE — DIETITIAN INITIAL EVALUATION ADULT. - ETIOLOGY
related to inability to consume increased protein energy intake while NPO in setting of unstageable sacral wound, hypovolemic shock for possible GI bleed/blood loss anemia

## 2019-12-28 NOTE — DIETITIAN INITIAL EVALUATION ADULT. - PERTINENT LABORATORY DATA
12-28 Na137 mmol/L Glu 160 mg/dL<H> K+ 4.5 mmol/L Cr  3.48 mg/dL<H> BUN 58.0 mg/dL<H> Phos 6.2 mg/dL<H> Alb n/a   PAB n/a

## 2019-12-28 NOTE — PROGRESS NOTE ADULT - SUBJECTIVE AND OBJECTIVE BOX
[facility] DIVISION OF KIDNEY DISEASES AND HYPERTENSION -- HEMODIALYSIS NOTE  --------------------------------------------------------------------------------  Chief Complaint: ESRD/Ongoing hemodialysis requirement    24 hour events/subjective: S/P PRBC Transfusion,  EEG Completed,     PAST HISTORY  --------------------------------------------------------------------------------  No significant changes to PMH, PSH, FHx, SHx, unless otherwise noted    ALLERGIES & MEDICATIONS  --------------------------------------------------------------------------------  Allergies    ACE inhibitors (Other (Mild))    Standing Inpatient Medications  albuterol/ipratropium for Nebulization. 3 milliLiter(s) Nebulizer every 6 hours  chlorhexidine 2% Cloths 1 Application(s) Topical <User Schedule>  chlorhexidine 4% Liquid 1 Application(s) Topical <User Schedule>  collagenase Ointment 1 Application(s) Topical daily  insulin lispro (HumaLOG) corrective regimen sliding scale   SubCutaneous every 6 hours  meropenem  IVPB 500 milliGRAM(s) IV Intermittent every 24 hours  octreotide  Infusion 50 MICROgram(s)/Hr IV Continuous <Continuous>  pantoprazole  Injectable 40 milliGRAM(s) IV Push two times a day    PRN Inpatient Medications  dextrose 40% Gel 15 Gram(s) Oral once PRN  glucagon  Injectable 1 milliGRAM(s) IntraMuscular once PRN  sodium chloride 0.9% lock flush 10 milliLiter(s) IV Push every 1 hour PRN    REVIEW OF SYSTEMS : Unable,   --------------------------------------------------------------------------------  VITALS/PHYSICAL EXAM  --------------------------------------------------------------------------------  T(C): 36.8 (12-28-19 @ 11:37), Max: 37.7 (12-27-19 @ 18:00)  HR: 84 (12-28-19 @ 13:00) (73 - 103)  BP: 139/59 (12-28-19 @ 13:00) (90/55 - 139/59)  RR: 23 (12-28-19 @ 13:00) (14 - 27)  SpO2: 94% (12-28-19 @ 13:00) (93% - 99%)    12-27-19 @ 07:01  -  12-28-19 @ 07:00  --------------------------------------------------------  IN: 680 mL / OUT: 1055 mL / NET: -375 mL    12-28-19 @ 07:01  -  12-28-19 @ 13:40  --------------------------------------------------------  IN: 400 mL / OUT: 0 mL / NET: 400 mL    Physical Exam:  	Gen: NAD, Poorly Responsive. Pale, Encephalopathic,   	HEENT: Supple neck, R - Corneal Opacity,   	Pulm: Coarse BS B/L  	CV: RRR, S1S2; no rub  	Abd: +BS, soft, nontender/nondistended  	: No suprapubic tenderness  	UE: Warm, FROM, no clubbing, intact strength; no edema; no asterixis  	LE: Warm, FROM, no clubbing, intact strength; 4 +  edema  	Neuro: No focal deficits,   	Psych: Normal affect and mood  	Skin: Warm, without rashes  	Vascular access: IJc,     LABS/STUDIES  --------------------------------------------------------------------------------              7.9    14.55 >-----------<  176      [12-28-19 @ 05:01]              23.6     137  |  98  |  58.0  ----------------------------<  160      [12-28-19 @ 05:01]  4.5   |  18.0  |  3.48        Ca     7.9     [12-28-19 @ 05:01]      Mg     2.0     [12-28-19 @ 05:01]      Phos  6.2     [12-28-19 @ 05:01]    TPro  7.0  /  Alb  2.4  /  TBili  1.0  /  DBili  x   /  AST  41  /  ALT  13  /  AlkPhos  117  [12-26-19 @ 18:42]    Iron 335, TIBC 419, %sat 80      [08-06-19 @ 14:55]  Ferritin 102      [08-06-19 @ 14:55]  PTH -- (Ca 9.1)      [05-30-19 @ 18:09]   107  PTH -- (Ca 7.9)      [01-17-19 @ 16:26]   175  PTH -- (Ca 8.1)      [01-16-19 @ 16:55]   148  Vitamin D (25OH) 31.1      [05-30-19 @ 18:17]  HbA1c 5.8      [12-26-19 @ 09:04]  TSH 1.10      [05-17-19 @ 12:21]    HBsAb 18.0      [12-26-19 @ 22:01]  HBsAg Nonreact      [12-26-19 @ 22:01]  HBcAb Nonreact      [12-26-19 @ 22:01]  HCV 0.20, Nonreact      [12-26-19 @ 22:01]

## 2019-12-28 NOTE — CONSULT NOTE ADULT - SUBJECTIVE AND OBJECTIVE BOX
MICU DOWNGRADE NOTE      HPI:  65 y/o F with a h/o dCHF, moderate pHTN, CAD, CKD, HTN, DM, cirrhosis, portal gastropathy, recurrent GIB, blind, large sacral wound, presents to the ED from NH with AMS, hypoxemia, and hypotension. Patient had large melenotic BM in the ED. H&H: 5.8/17. Lactate: 2.7. KEITH on CKD. Hyperkalemic. Volume resuscitation began with 3u PRBC. Patient minimally responsive and moaning in pain. UA(+) with thick tan urine in fontenot. Developed progressive respiratory distress and hypoxemia requiring NIPPV. (26 Dec 2019 03:58)    MICU COURSE:    admitted with acute blood loss anemia sec to GIB AVMs(melena at NH), Hypoxia that initially required BiPAP now on NC O2, AMS- lethargy/ somnolence, ATN sec to blood loss, encephalopathy- uremic/ hepatic, Pulm edema and uremia requiring HD initiation. Noted RLL infiltrate and antibiotics started for same as HCAP (coming from NH). NGT inserted today and lactulose started. Encephalopathy continues.      PAST MEDICAL & SURGICAL HISTORY:  Legally blind in right eye, as defined in [place]  CAD in native artery  Chronic CHF  DM (diabetes mellitus), type 2  GERD (gastroesophageal reflux disease)  Hyperlipidemia  HTN (hypertension)  Congestive heart failure, unspecified HF chronicity, unspecified heart failure type  Insomnia  Retinal detachment  CAD (coronary artery disease)  Glaucoma  Urinary retention  Peripheral neuropathy  GERD (gastroesophageal reflux disease)  Depression  Diabetes  Hypertension, unspecified type  Herniated disc, cervical  PAD (peripheral artery disease)  Legally blind  History of peripheral vascular disease  History of retinal detachment  History of CEA (carotid endarterectomy): Right  Hyperlipidemia  Glaucoma  Hypertension  Diabetes  H/O carotid endarterectomy  S/P CABG (coronary artery bypass graft)  No significant past surgical history  S/P CABG x 1  After cataract  Uveitic glaucoma of both eyes  Detached retina  Atherosclerosis of right carotid artery   delivery delivered      MEDICATIONS  (STANDING):  albuterol/ipratropium for Nebulization. 3 milliLiter(s) Nebulizer every 6 hours  chlorhexidine 2% Cloths 1 Application(s) Topical <User Schedule>  chlorhexidine 4% Liquid 1 Application(s) Topical <User Schedule>  collagenase Ointment 1 Application(s) Topical daily  dextrose 5%. 1000 milliLiter(s) (50 mL/Hr) IV Continuous <Continuous>  dextrose 50% Injectable 12.5 Gram(s) IV Push once  dextrose 50% Injectable 25 Gram(s) IV Push once  dextrose 50% Injectable 25 Gram(s) IV Push once  epoetin sara Injectable 74845 Unit(s) IV Push <User Schedule>  insulin lispro (HumaLOG) corrective regimen sliding scale   SubCutaneous every 6 hours  lactulose Syrup 20 Gram(s) Oral two times a day  meropenem  IVPB 500 milliGRAM(s) IV Intermittent every 24 hours  octreotide  Infusion 50 MICROgram(s)/Hr (10 mL/Hr) IV Continuous <Continuous>  pantoprazole  Injectable 40 milliGRAM(s) IV Push two times a day  rifAXIMin 550 milliGRAM(s) Oral two times a day    MEDICATIONS  (PRN):  dextrose 40% Gel 15 Gram(s) Oral once PRN Blood Glucose LESS THAN 70 milliGRAM(s)/deciLiter  glucagon  Injectable 1 milliGRAM(s) IntraMuscular once PRN Glucose <70 milliGRAM(s)/deciLiter      Allergies    ACE inhibitors (Other (Mild))    Intolerances      SOCIAL HISTORY:  Denies any habits    FAMILY HISTORY:  type 2 diabetes: mom and dad      Vital Signs Last 24 Hrs  T(C): 37.2 (28 Dec 2019 15:26), Max: 37.2 (28 Dec 2019 15:26)  T(F): 98.9 (28 Dec 2019 15:26), Max: 98.9 (28 Dec 2019 15:26)  HR: 85 (28 Dec 2019 19:00) (73 - 86)  BP: 123/79 (28 Dec 2019 19:00) (94/63 - 147/83)  BP(mean): 95 (28 Dec 2019 19:00) (71 - 107)  RR: 25 (28 Dec 2019 19:00) (14 - 28)  SpO2: 95% (28 Dec 2019 19:00) (92% - 99%)    REVIEW OF SYSTEMS:    encephalopathic- lethargy/ somnolence    PHYSICAL EXAM:    GENERAL: Awake, lethargic, not following commands  HEAD:  Atraumatic, Normocephalic  EYES: , conjunctiva and sclera clear  ENT: Moist mucous membranes,  No lesions  NECK: Supple, No JVD, Normal thyroid  NERVOUS SYSTEM:  Awake, lethargic, Moving extremities  CHEST/LUNG: Clear to percussion bilaterally; No rales, rhonchi, wheezing, or rubs  HEART: Regular rate and rhythm; No murmurs, rubs, or gallops  ABDOMEN: Soft, Nontender, Nondistended; Bowel sounds present, Sacral Decub ulcer  EXTREMITIES:  2+ Peripheral Pulses, No clubbing, cyanosis, or edema      LABS:                        9.5    14.93 )-----------( 171      ( 28 Dec 2019 14:53 )             27.1         137  |  98  |  58.0<H>  ----------------------------<  160<H>  4.5   |  18.0<L>  |  3.48<H>    Ca    7.9<L>      28 Dec 2019 05:01  Phos  6.2       Mg     2.0

## 2019-12-28 NOTE — CONSULT NOTE ADULT - ASSESSMENT
65 yo female with hx of CHFpEF, CAD, CKD, HTN, DM, cirrhosis, portal gastropathy, recurrent GI bleed, blind, sacral decub ulcer, presented to the ED from nursing home with encephalopathy, hypoxemia, hypotension, found to have anemia with Hb 5, possible UTI with sepsis, KEITH, hyperkalemia.        Acute blood loss Anemia   Responded to blood transfusion, no signs of active bleeding at this time.    protonix, octreotide- total 5 days, abx for SBP prophylaxis    Known [place]ED[place] Cirrhosis with portal gastropathy and AVMs  Lactulose and rifaximin  Octreotide, PPi and SBP coverage    KEITH/ ATN on CKD  likely sec to blood loss  Was briefly on dialysis in the past  Renal US showed no hydronephrosis  now again on HD per nephrology  will likely need long term HD    Acute on chronic diastolic CHF/ Acute hypoxic respiratory failure  sec to pulm edema  no longer requiring bipap    Sepsis  right sided infiltrate on CXR, was on zosyn at nursing home being treated for the same, broadened to meropenem x 5 days by MICU team  may also have infected decub ulcer, surgery following for debridement     Metabolic vs Uremic vs Hepatic Encephalopathy  likely multifactorial- Hepatic, Uremic  CT head negative, EEG without seizures   consult placed to neurology ([place]) by MICU, will see in AM    HTN, DM2  well controlled  Cont current regimen    ICDs

## 2019-12-28 NOTE — DIETITIAN INITIAL EVALUATION ADULT. - PROBLEM SELECTOR PLAN 8
Likely multifactorial: acidosis, blood loss, infection. Continue to treat underlying issues as above. Monitor for acute changes in mental status. Will consider CT head if this does not improve as underlying issues improve.

## 2019-12-28 NOTE — PROGRESS NOTE ADULT - ASSESSMENT
63 yo female with hx of CHFpEF, CAD, CKD, HTN, DM, cirrhosis, portal gastropathy, recurrent GI bleed, blind, sacral decub ulcer, presented to the ED from nursing home with encephalopathy, hypoxemia, hypotension, found to have anemia with Hb 5, possible UTI with sepsis, KEITH, hyperkalemia.      Plan   Anemia   Responded to blood transfusion, no signs of active bleeding at this time.    - protonix, octreotide, abx for SBP prophylaxis    KEITH on CKD  Was briefly on dialysis in the past  Renal US showed no hydronephrosis  - now again on HD per nephrology  - will likely need long term HD    Acute on chronic diastolic CHF/ Acute respiratory failure  - no longer requiring bipap    Sepsis  - right sided infiltrate on CXR, was on zosyn at nursing home, will broaden to meropenem x 5 days  - may also have infected decub ulcer, surgery following for debridement     Encephalopathy  Possibly due to sepsis.  Ammonia level in 50s.  - CT head negative, EEG without seizures 63 yo female with hx of CHFpEF, CAD, CKD, HTN, DM, cirrhosis, portal gastropathy, recurrent GI bleed, blind, sacral decub ulcer, presented to the ED from nursing home with encephalopathy, hypoxemia, hypotension, found to have anemia with Hb 5, possible UTI with sepsis, KEITH, hyperkalemia.      Plan   Anemia   Responded to blood transfusion, no signs of active bleeding at this time.    - protonix, octreotide, abx for SBP prophylaxis    KEITH on CKD  Was briefly on dialysis in the past  Renal US showed no hydronephrosis  - now again on HD per nephrology  - will likely need long term HD    Acute on chronic diastolic CHF/ Acute respiratory failure  - no longer requiring bipap    Sepsis  - right sided infiltrate on CXR, was on zosyn at nursing home, will broaden to meropenem x 5 days  - may also have infected decub ulcer, surgery following for debridement     Encephalopathy  Possibly due to sepsis.  Ammonia level in 50s.  - CT head negative, EEG without seizures   - consult placed to neurology ([place]), will see in AM

## 2019-12-28 NOTE — CHART NOTE - NSCHARTNOTEFT_GEN_A_CORE
Upon Nutritional Assessment by the Registered Dietitian your patient was determined to meet criteria / has evidence of the following diagnosis/diagnoses:               [x ]  Moderate Protein Calorie Malnutrition          Findings as based on:  •  Comprehensive nutrition assessment and consultation  •  Calorie counts (nutrient intake analysis)  •  Food acceptance and intake status from observations by staff  •  Follow up  •  Patient education  •  Intervention secondary to interdisciplinary rounds  •   concerns      Treatment:    The following diet has been recommended:  If unable to initiate po diet, consider starting Nepro @ 30ml/hr as tolerated    PROVIDER Section:     By signing this assessment you are acknowledging and agree with the diagnosis/diagnoses assigned by the Registered Dietitian    Comments:

## 2019-12-28 NOTE — PROGRESS NOTE ADULT - SUBJECTIVE AND OBJECTIVE BOX
INTERVAL HPI/OVERNIGHT EVENTS:FU for GI bleeding. Responding appropriately to PRBC transfusion. Received HD today. To be given PRBC during HD. Patient really not very responsive. On PPI and octreotide infusion.    MEDICATIONS  (STANDING):  albuterol/ipratropium for Nebulization. 3 milliLiter(s) Nebulizer every 6 hours  chlorhexidine 2% Cloths 1 Application(s) Topical <User Schedule>  chlorhexidine 4% Liquid 1 Application(s) Topical <User Schedule>  collagenase Ointment 1 Application(s) Topical daily  dextrose 5%. 1000 milliLiter(s) (50 mL/Hr) IV Continuous <Continuous>  dextrose 50% Injectable 12.5 Gram(s) IV Push once  dextrose 50% Injectable 25 Gram(s) IV Push once  dextrose 50% Injectable 25 Gram(s) IV Push once  epoetin sara Injectable 16316 Unit(s) IV Push <User Schedule>  insulin lispro (HumaLOG) corrective regimen sliding scale   SubCutaneous every 6 hours  meropenem  IVPB 500 milliGRAM(s) IV Intermittent every 24 hours  octreotide  Infusion 50 MICROgram(s)/Hr (10 mL/Hr) IV Continuous <Continuous>  pantoprazole  Injectable 40 milliGRAM(s) IV Push two times a day    MEDICATIONS  (PRN):  dextrose 40% Gel 15 Gram(s) Oral once PRN Blood Glucose LESS THAN 70 milliGRAM(s)/deciLiter  glucagon  Injectable 1 milliGRAM(s) IntraMuscular once PRN Glucose <70 milliGRAM(s)/deciLiter  sodium chloride 0.9% lock flush 10 milliLiter(s) IV Push every 1 hour PRN Pre/post blood products, medications, blood draw, and to maintain line patency      Allergies    ACE inhibitors (Other (Mild))    Intolerances        Vital Signs Last 24 Hrs  T(C): 37.2 (28 Dec 2019 15:26), Max: 37.7 (27 Dec 2019 18:00)  T(F): 98.9 (28 Dec 2019 15:26), Max: 99.9 (27 Dec 2019 18:00)  HR: 85 (28 Dec 2019 15:00) (73 - 101)  BP: 132/56 (28 Dec 2019 15:00) (90/55 - 139/59)  BP(mean): 78 (28 Dec 2019 15:00) (67 - 104)  RR: 22 (28 Dec 2019 15:00) (14 - 27)  SpO2: 97% (28 Dec 2019 15:00) (93% - 99%)    LABS:                        9.5    14.93 )-----------( 171      ( 28 Dec 2019 14:53 )             27.1     12-28    137  |  98  |  58.0<H>  ----------------------------<  160<H>  4.5   |  18.0<L>  |  3.48<H>    Ca    7.9<L>      28 Dec 2019 05:01  Phos  6.2     12-28  Mg     2.0     12-28    TPro  7.0  /  Alb  2.4<L>  /  TBili  1.0  /  DBili  x   /  AST  41<H>  /  ALT  13  /  AlkPhos  117  12-26          RADIOLOGY & ADDITIONAL TESTS:

## 2019-12-28 NOTE — PROVIDER CONTACT NOTE (EICU) - ACTION/TREATMENT ORDERED:
-have entered AM labs for this patient including CBC, BMP, magnesium and phosphorous levels  -will CTM for results and replete as per [place] standard

## 2019-12-29 LAB
ANION GAP SERPL CALC-SCNC: 19 MMOL/L — HIGH (ref 5–17)
BUN SERPL-MCNC: 35 MG/DL — HIGH (ref 8–20)
CALCIUM SERPL-MCNC: 8.2 MG/DL — LOW (ref 8.6–10.2)
CHLORIDE SERPL-SCNC: 98 MMOL/L — SIGNIFICANT CHANGE UP (ref 98–107)
CO2 SERPL-SCNC: 22 MMOL/L — SIGNIFICANT CHANGE UP (ref 22–29)
CREAT SERPL-MCNC: 2.65 MG/DL — HIGH (ref 0.5–1.3)
GLUCOSE BLDC GLUCOMTR-MCNC: 126 MG/DL — HIGH (ref 70–99)
GLUCOSE BLDC GLUCOMTR-MCNC: 131 MG/DL — HIGH (ref 70–99)
GLUCOSE BLDC GLUCOMTR-MCNC: 150 MG/DL — HIGH (ref 70–99)
GLUCOSE BLDC GLUCOMTR-MCNC: 194 MG/DL — HIGH (ref 70–99)
GLUCOSE SERPL-MCNC: 134 MG/DL — HIGH (ref 70–115)
HCT VFR BLD CALC: 26.6 % — LOW (ref 34.5–45)
HGB BLD-MCNC: 9 G/DL — LOW (ref 11.5–15.5)
MAGNESIUM SERPL-MCNC: 1.9 MG/DL — SIGNIFICANT CHANGE UP (ref 1.6–2.6)
MCHC RBC-ENTMCNC: 28.6 PG — SIGNIFICANT CHANGE UP (ref 27–34)
MCHC RBC-ENTMCNC: 33.8 GM/DL — SIGNIFICANT CHANGE UP (ref 32–36)
MCV RBC AUTO: 84.4 FL — SIGNIFICANT CHANGE UP (ref 80–100)
PHOSPHATE SERPL-MCNC: 4.4 MG/DL — SIGNIFICANT CHANGE UP (ref 2.4–4.7)
PLATELET # BLD AUTO: 160 K/UL — SIGNIFICANT CHANGE UP (ref 150–400)
POTASSIUM SERPL-MCNC: 3.6 MMOL/L — SIGNIFICANT CHANGE UP (ref 3.5–5.3)
POTASSIUM SERPL-SCNC: 3.6 MMOL/L — SIGNIFICANT CHANGE UP (ref 3.5–5.3)
RBC # BLD: 3.15 M/UL — LOW (ref 3.8–5.2)
RBC # FLD: 18.6 % — HIGH (ref 10.3–14.5)
SODIUM SERPL-SCNC: 139 MMOL/L — SIGNIFICANT CHANGE UP (ref 135–145)
WBC # BLD: 14.27 K/UL — HIGH (ref 3.8–10.5)
WBC # FLD AUTO: 14.27 K/UL — HIGH (ref 3.8–10.5)

## 2019-12-29 PROCEDURE — 99233 SBSQ HOSP IP/OBS HIGH 50: CPT

## 2019-12-29 PROCEDURE — 99231 SBSQ HOSP IP/OBS SF/LOW 25: CPT

## 2019-12-29 PROCEDURE — 99232 SBSQ HOSP IP/OBS MODERATE 35: CPT

## 2019-12-29 PROCEDURE — 99223 1ST HOSP IP/OBS HIGH 75: CPT

## 2019-12-29 PROCEDURE — 90937 HEMODIALYSIS REPEATED EVAL: CPT

## 2019-12-29 RX ORDER — MIDODRINE HYDROCHLORIDE 2.5 MG/1
10 TABLET ORAL THREE TIMES A DAY
Refills: 0 | Status: DISCONTINUED | OUTPATIENT
Start: 2019-12-29 | End: 2020-01-01

## 2019-12-29 RX ORDER — ACETAMINOPHEN 500 MG
650 TABLET ORAL EVERY 6 HOURS
Refills: 0 | Status: DISCONTINUED | OUTPATIENT
Start: 2019-12-29 | End: 2020-01-01

## 2019-12-29 RX ADMIN — Medication 3 MILLILITER(S): at 04:11

## 2019-12-29 RX ADMIN — PANTOPRAZOLE SODIUM 40 MILLIGRAM(S): 20 TABLET, DELAYED RELEASE ORAL at 05:03

## 2019-12-29 RX ADMIN — LACTULOSE 20 GRAM(S): 10 SOLUTION ORAL at 05:03

## 2019-12-29 RX ADMIN — Medication 3 MILLILITER(S): at 09:14

## 2019-12-29 RX ADMIN — MEROPENEM 100 MILLIGRAM(S): 1 INJECTION INTRAVENOUS at 13:14

## 2019-12-29 RX ADMIN — LACTULOSE 20 GRAM(S): 10 SOLUTION ORAL at 17:07

## 2019-12-29 RX ADMIN — OCTREOTIDE ACETATE 10 MICROGRAM(S)/HR: 200 INJECTION, SOLUTION INTRAVENOUS; SUBCUTANEOUS at 17:07

## 2019-12-29 RX ADMIN — ERYTHROPOIETIN 10000 UNIT(S): 10000 INJECTION, SOLUTION INTRAVENOUS; SUBCUTANEOUS at 13:14

## 2019-12-29 RX ADMIN — OCTREOTIDE ACETATE 10 MICROGRAM(S)/HR: 200 INJECTION, SOLUTION INTRAVENOUS; SUBCUTANEOUS at 05:03

## 2019-12-29 RX ADMIN — Medication 650 MILLIGRAM(S): at 13:01

## 2019-12-29 RX ADMIN — CHLORHEXIDINE GLUCONATE 1 APPLICATION(S): 213 SOLUTION TOPICAL at 05:03

## 2019-12-29 RX ADMIN — Medication 1 APPLICATION(S): at 05:03

## 2019-12-29 RX ADMIN — Medication 3 MILLILITER(S): at 15:37

## 2019-12-29 RX ADMIN — MIDODRINE HYDROCHLORIDE 10 MILLIGRAM(S): 2.5 TABLET ORAL at 14:27

## 2019-12-29 RX ADMIN — Medication 650 MILLIGRAM(S): at 14:05

## 2019-12-29 RX ADMIN — PANTOPRAZOLE SODIUM 40 MILLIGRAM(S): 20 TABLET, DELAYED RELEASE ORAL at 17:06

## 2019-12-29 RX ADMIN — Medication 3 MILLILITER(S): at 20:30

## 2019-12-29 NOTE — PROGRESS NOTE ADULT - ASSESSMENT
HD x 3 hours tolerated well today.     1.5 L  fluid removed during tx,     1 unit PRBC  transfused during HD tolerated well.     Report given to Rohini Stanley RN

## 2019-12-29 NOTE — PROGRESS NOTE ADULT - ASSESSMENT
Patient with ESRD, anemia, sacral decubti, SB AVMs, with anemia. No more bleeding. Continue octreotide. Continue PPI. Monitor Hct. Will not plan any endoscopic evaluation unless start to have overt GI bleeding.

## 2019-12-29 NOTE — CONSULT NOTE ADULT - SUBJECTIVE AND OBJECTIVE BOX
[facility]                                        Neurology at [place]                                  Shayan Subramanian & Henry                                      [address]                                           [place], [place], [address]                                                [phone]        CC: encephalopathy.    HISTORY:  The patient is a 64y Female with multiple medical issues admitted 19 from Nursing Home with altered mental status, hypoxemia, hypotension, and GI bleed. She required volume resuscitation blood transfusion, and BiPAP with admission to MICU. Found to have urinary tract infection with sepsis. She was treated with antibiotics.  She was titrated down to high flow nasal oxygen.  She was downgraded from ICU early this morning.     Neurology was called due to persisting encephalopathy.     PAST MEDICAL & SURGICAL HISTORY:  Legally blind in right eye, as defined in [place]  CAD in native artery  Chronic CHF  DM (diabetes mellitus), type 2  GERD (gastroesophageal reflux disease)  Hyperlipidemia  HTN (hypertension)  Congestive heart failure, unspecified HF chronicity, unspecified heart failure type  Insomnia  Retinal detachment  CAD (coronary artery disease)  Glaucoma  Urinary retention  Peripheral neuropathy  GERD (gastroesophageal reflux disease)  Depression  Diabetes  Hypertension, unspecified type  Herniated disc, cervical  PAD (peripheral artery disease)  Legally blind  History of peripheral vascular disease  History of retinal detachment  History of CEA (carotid endarterectomy): Right  Hyperlipidemia  Glaucoma  Hypertension  Diabetes  H/O carotid endarterectomy  S/P CABG (coronary artery bypass graft)  No significant past surgical history  S/P CABG x 1  After cataract  Uveitic glaucoma of both eyes  Detached retina  Atherosclerosis of right carotid artery   delivery delivered    MEDICATIONS  (STANDING):  albuterol/ipratropium for Nebulization. 3 milliLiter(s) Nebulizer every 6 hours  chlorhexidine 2% Cloths 1 Application(s) Topical <User Schedule>  chlorhexidine 4% Liquid 1 Application(s) Topical <User Schedule>  collagenase Ointment 1 Application(s) Topical daily  dextrose 5%. 1000 milliLiter(s) (50 mL/Hr) IV Continuous <Continuous>  epoetin sara Injectable 68072 Unit(s) IV Push <User Schedule>  insulin lispro (HumaLOG) corrective regimen sliding scale   SubCutaneous every 6 hours  lactulose Syrup 20 Gram(s) Oral two times a day  meropenem  IVPB 500 milliGRAM(s) IV Intermittent every 24 hours  octreotide  Infusion 50 MICROgram(s)/Hr (10 mL/Hr) IV Continuous <Continuous>  pantoprazole  Injectable 40 milliGRAM(s) IV Push two times a day  rifAXIMin 550 milliGRAM(s) Oral two times a day    MEDICATIONS  (PRN):  dextrose 40% Gel 15 Gram(s) Oral once PRN Blood Glucose LESS THAN 70 milliGRAM(s)/deciLiter  glucagon  Injectable 1 milliGRAM(s) IntraMuscular once PRN Glucose <70 milliGRAM(s)/deciLiter    Allergies  ACE inhibitors (Other (Mild))    SOCIAL HISTORY:  Non smoker.    FAMILY HISTORY:  No known history of stroke in mother/father.  FH: type 2 diabetes: mom and dad    ROS:  Constitutional: The patient denies fevers or weight changes.  Neuro: As per HPI.  Eyes: Denies blurry vision.  Ears/nose/throat: Denies Tinnitus.   Cardiac: Denies chest pain. Denies palpitations.  Respiratory: Reports shortness of breath.  GI: Denies abdominal pain, nausea, or vomiting.  : Denies change in urinary pattern.  Integumentary: Sacral ulcer.  Psych: History of depression.  Heme: denies easy bleeding/bruising.    Exam:  Vital Signs Last 24 Hrs  T(C): 37.1 (29 Dec 2019 08:52), Max: 37.2 (28 Dec 2019 15:26)  T(F): 98.7 (29 Dec 2019 08:52), Max: 98.9 (28 Dec 2019 15:26)  HR: 108 (29 Dec 2019 09:14) (79 - 108)  BP: 91/51 (29 Dec 2019 08:00) (91/51 - 147/83)  BP(mean): 64 (29 Dec 2019 04:00) (60 - 107)  RR: 21 (29 Dec 2019 08:00) (16 - 28)  SpO2: 93% (29 Dec 2019 09:14) (90% - 97%)  General: NAD.   Carotid bruits absent.     Mental status: The patient is awake. She answers simple questions and follows simple instructions.    Cranial nerves: The right eye is opacified and blind. There is no papilledema in the left eye. Left pupil reacts to light. She blinks to threat to confrontation in the left eye. Extraocular motion is full with no nystagmus. There is no ptosis. Facial sensation is intact. Facial musculature is symmetric. Palate elevates symmetrically. Tongue is midline.    Motor: There is normal bulk and tone.  Strength is symmetric in upper extremities and lower extremities although diffusely weak.    Sensation: Grossly intact to light touch and pin.    Reflexes: Trace throughout and plantar responses are flexor.    Cerebellar: There is no dysmetria on finger to nose testing.    LABS:                         9.0    14.27 )-----------( 160      ( 29 Dec 2019 06:42 )             26.6           139  |  98  |  35.0<H>  ----------------------------<  134<H>  3.6   |  22.0  |  2.65<H>    Ca    8.2<L>      29 Dec 2019 06:42  Phos  4.4       Mg     1.9             RADIOLOGY   CT head images reviewed (and concur with report): There is no acute pathology.     EEG showed slowing but no epileptic activity.

## 2019-12-29 NOTE — PROGRESS NOTE ADULT - SUBJECTIVE AND OBJECTIVE BOX
INTERVAL HPI/OVERNIGHT EVENTS:FU for GI bleeding. No more bleeding reported. Hct stable. Confused.     MEDICATIONS  (STANDING):  albuterol/ipratropium for Nebulization. 3 milliLiter(s) Nebulizer every 6 hours  chlorhexidine 2% Cloths 1 Application(s) Topical <User Schedule>  chlorhexidine 4% Liquid 1 Application(s) Topical <User Schedule>  collagenase Ointment 1 Application(s) Topical daily  dextrose 5%. 1000 milliLiter(s) (50 mL/Hr) IV Continuous <Continuous>  dextrose 50% Injectable 12.5 Gram(s) IV Push once  dextrose 50% Injectable 25 Gram(s) IV Push once  dextrose 50% Injectable 25 Gram(s) IV Push once  epoetin sara Injectable 42499 Unit(s) IV Push <User Schedule>  insulin lispro (HumaLOG) corrective regimen sliding scale   SubCutaneous every 6 hours  lactulose Syrup 20 Gram(s) Oral two times a day  meropenem  IVPB 500 milliGRAM(s) IV Intermittent every 24 hours  octreotide  Infusion 50 MICROgram(s)/Hr (10 mL/Hr) IV Continuous <Continuous>  pantoprazole  Injectable 40 milliGRAM(s) IV Push two times a day  rifAXIMin 550 milliGRAM(s) Oral two times a day    MEDICATIONS  (PRN):  acetaminophen   Tablet .. 650 milliGRAM(s) Oral every 6 hours PRN Temp greater or equal to 38C (100.4F), Mild Pain (1 - 3)  dextrose 40% Gel 15 Gram(s) Oral once PRN Blood Glucose LESS THAN 70 milliGRAM(s)/deciLiter  glucagon  Injectable 1 milliGRAM(s) IntraMuscular once PRN Glucose <70 milliGRAM(s)/deciLiter  midodrine. 10 milliGRAM(s) Oral three times a day PRN if SBP 90 or less give 1 hour before HD      Allergies    ACE inhibitors (Other (Mild))    Intolerances        Vital Signs Last 24 Hrs  T(C): 36.9 (29 Dec 2019 11:38), Max: 37.2 (28 Dec 2019 15:26)  T(F): 98.4 (29 Dec 2019 11:38), Max: 98.9 (28 Dec 2019 15:26)  HR: 83 (29 Dec 2019 11:38) (80 - 108)  BP: 99/43 (29 Dec 2019 11:38) (91/51 - 147/83)  BP(mean): 64 (29 Dec 2019 04:00) (60 - 107)  RR: 21 (29 Dec 2019 11:38) (16 - 28)  SpO2: 97% (29 Dec 2019 11:38) (90% - 97%)    LABS:                        9.0    14.27 )-----------( 160      ( 29 Dec 2019 06:42 )             26.6     12-29    139  |  98  |  35.0<H>  ----------------------------<  134<H>  3.6   |  22.0  |  2.65<H>    Ca    8.2<L>      29 Dec 2019 06:42  Phos  4.4     12-29  Mg     1.9     12-29            RADIOLOGY & ADDITIONAL TESTS:

## 2019-12-29 NOTE — PROGRESS NOTE ADULT - SUBJECTIVE AND OBJECTIVE BOX
[facility] DIVISION OF KIDNEY DISEASES AND HYPERTENSION -- HEMODIALYSIS NOTE  --------------------------------------------------------------------------------  Chief Complaint: ESRD/Ongoing hemodialysis requirement    24 hour events/subjective: More Responsive,     PAST HISTORY  --------------------------------------------------------------------------------  No significant changes to PMH, PSH, FHx, SHx, unless otherwise noted    ALLERGIES & MEDICATIONS  --------------------------------------------------------------------------------  Allergies    ACE inhibitors (Other (Mild))    Standing Inpatient Medications  albuterol/ipratropium for Nebulization. 3 milliLiter(s) Nebulizer every 6 hours  chlorhexidine 2% Cloths 1 Application(s) Topical <User Schedule>  chlorhexidine 4% Liquid 1 Application(s) Topical <User Schedule>  collagenase Ointment 1 Application(s) Topical daily  dextrose 5%. 1000 milliLiter(s) IV Continuous <Continuous>  dextrose 50% Injectable 12.5 Gram(s) IV Push once  dextrose 50% Injectable 25 Gram(s) IV Push once  dextrose 50% Injectable 25 Gram(s) IV Push once  epoetin sara Injectable 04193 Unit(s) IV Push <User Schedule>  insulin lispro (HumaLOG) corrective regimen sliding scale   SubCutaneous every 6 hours  lactulose Syrup 20 Gram(s) Oral two times a day  meropenem  IVPB 500 milliGRAM(s) IV Intermittent every 24 hours  octreotide  Infusion 50 MICROgram(s)/Hr IV Continuous <Continuous>  pantoprazole  Injectable 40 milliGRAM(s) IV Push two times a day  rifAXIMin 550 milliGRAM(s) Oral two times a day    PRN Inpatient Medications  dextrose 40% Gel 15 Gram(s) Oral once PRN  glucagon  Injectable 1 milliGRAM(s) IntraMuscular once PRN    REVIEW OF SYSTEMS : Unable,   --------------------------------------------------------------------------------  All other systems were reviewed and are negative, except as noted.    VITALS/PHYSICAL EXAM  --------------------------------------------------------------------------------  T(C): 36.9 (12-29-19 @ 11:38), Max: 37.2 (12-28-19 @ 15:26)  HR: 83 (12-29-19 @ 11:38) (80 - 108)  BP: 99/43 (12-29-19 @ 11:38) (91/51 - 147/83)  RR: 21 (12-29-19 @ 11:38) (16 - 28)  SpO2: 97% (12-29-19 @ 11:38) (90% - 97%)    12-28-19 @ 07:01  -  12-29-19 @ 07:00  --------------------------------------------------------  IN: 620 mL / OUT: 1500 mL / NET: -880 mL    Physical Exam:  	Gen: NAD, ill-appearing , Pale,   	HEENT: PERRL, supple neck,  	Pulm: CTA B/L  	CV: RRR, S1S2; no rub  	Back: No spinal or CVA tenderness; no sacral edema  	Abd: +BS, soft, nontender/nondistended  	: No suprapubic tenderness  	UE: Warm, FROM, no clubbing, intact strength; no edema; no asterixis  	LE: Warm, FROM, no clubbing, intact strength; no edema  	Neuro: No focal deficits,   	Psych: Normal affect and mood  	Skin: Warm, without rashes  	Vascular access: IJc,     LABS/STUDIES  --------------------------------------------------------------------------------              9.0    14.27 >-----------<  160      [12-29-19 @ 06:42]              26.6     139  |  98  |  35.0  ----------------------------<  134      [12-29-19 @ 06:42]  3.6   |  22.0  |  2.65        Ca     8.2     [12-29-19 @ 06:42]      Mg     1.9     [12-29-19 @ 06:42]      Phos  4.4     [12-29-19 @ 06:42]    Iron 335, TIBC 419, %sat 80      [08-06-19 @ 14:55]  Ferritin 102      [08-06-19 @ 14:55]  PTH -- (Ca 9.1)      [05-30-19 @ 18:09]   107  PTH -- (Ca 7.9)      [01-17-19 @ 16:26]   175  PTH -- (Ca 8.1)      [01-16-19 @ 16:55]   148  Vitamin D (25OH) 31.1      [05-30-19 @ 18:17]  HbA1c 5.8      [12-26-19 @ 09:04]  TSH 1.10      [05-17-19 @ 12:21]    HBsAb 18.0      [12-26-19 @ 22:01]  HBsAg Nonreact      [12-26-19 @ 22:01]  HBcAb Nonreact      [12-26-19 @ 22:01]  HCV 0.20, Nonreact      [12-26-19 @ 22:01]

## 2019-12-29 NOTE — CONSULT NOTE ADULT - ASSESSMENT
The patient is a 64y Female with multiple medical issues who presented with encephalopathy in the setting of sepsis and multiple decompensated medical conditions.     Encephalopathy.  Likely toxic-metabolic with components from sepsis, hepatic failure, renal failure, respiratory failure.   Underlying conditions being treated.   Mental status improving.      No further specific neurologic recommendations.     Case discussed with Dr King.

## 2019-12-29 NOTE — PROGRESS NOTE ADULT - SUBJECTIVE AND OBJECTIVE BOX
HEALTH ISSUES - PROBLEM Dx:    acute blood loss anemia sec to GIB AVMs, encephalopathy, hypoxia, uremia, bipin    INTERVAL HPI/ OVERNIGHT EVENTS:    now on Hi yazmin O2, awake and conversing  on HD now  NGT removed at bedside    REVIEW OF SYSTEMS:    awake, conversant, c/p gen body pain,     Vital Signs Last 24 Hrs  T(C): 36.9 (29 Dec 2019 11:38), Max: 37.2 (28 Dec 2019 15:26)  T(F): 98.4 (29 Dec 2019 11:38), Max: 98.9 (28 Dec 2019 15:26)  HR: 83 (29 Dec 2019 11:38) (80 - 108)  BP: 99/43 (29 Dec 2019 11:38) (91/51 - 147/83)  BP(mean): 64 (29 Dec 2019 04:00) (60 - 107)  RR: 21 (29 Dec 2019 11:38) (16 - 28)  SpO2: 97% (29 Dec 2019 11:38) (90% - 97%)    PHYSICAL EXAM-  GENERAL: Awake, conversant, following commands, on Hi yazmin O2  HEAD:  Atraumatic, Normocephalic  EYES: , conjunctiva and sclera clear, right sided white eye  ENT: Moist mucous membranes,  No lesions  NECK: Supple, No JVD, Normal thyroid  NERVOUS SYSTEM:  Awake, lethargic, Moving extremities  CHEST/LUNG: Clear to percussion bilaterally; No rales, rhonchi, wheezing, or rubs  HEART: Regular rate and rhythm; No murmurs, rubs, or gallops  ABDOMEN: Soft, Nontender, Nondistended; Bowel sounds present, Sacral Decub ulcer unstageable s/p debridement yesterday  EXTREMITIES:  2+ Peripheral Pulses, No clubbing, cyanosis, or edema    MEDICATIONS  (STANDING):  albuterol/ipratropium for Nebulization. 3 milliLiter(s) Nebulizer every 6 hours  chlorhexidine 2% Cloths 1 Application(s) Topical <User Schedule>  chlorhexidine 4% Liquid 1 Application(s) Topical <User Schedule>  collagenase Ointment 1 Application(s) Topical daily  dextrose 5%. 1000 milliLiter(s) (50 mL/Hr) IV Continuous <Continuous>  dextrose 50% Injectable 12.5 Gram(s) IV Push once  dextrose 50% Injectable 25 Gram(s) IV Push once  dextrose 50% Injectable 25 Gram(s) IV Push once  epoetin sara Injectable 37145 Unit(s) IV Push <User Schedule>  insulin lispro (HumaLOG) corrective regimen sliding scale   SubCutaneous every 6 hours  lactulose Syrup 20 Gram(s) Oral two times a day  meropenem  IVPB 500 milliGRAM(s) IV Intermittent every 24 hours  octreotide  Infusion 50 MICROgram(s)/Hr (10 mL/Hr) IV Continuous <Continuous>  pantoprazole  Injectable 40 milliGRAM(s) IV Push two times a day  rifAXIMin 550 milliGRAM(s) Oral two times a day    MEDICATIONS  (PRN):  acetaminophen   Tablet .. 650 milliGRAM(s) Oral every 6 hours PRN Temp greater or equal to 38C (100.4F), Mild Pain (1 - 3)  dextrose 40% Gel 15 Gram(s) Oral once PRN Blood Glucose LESS THAN 70 milliGRAM(s)/deciLiter  glucagon  Injectable 1 milliGRAM(s) IntraMuscular once PRN Glucose <70 milliGRAM(s)/deciLiter  midodrine. 10 milliGRAM(s) Oral three times a day PRN if SBP 90 or less give 1 hour before HD      LABS:                        9.0    14.27 )-----------( 160      ( 29 Dec 2019 06:42 )             26.6     12-29    139  |  98  |  35.0<H>  ----------------------------<  134<H>  3.6   |  22.0  |  2.65<H>    Ca    8.2<L>      29 Dec 2019 06:42  Phos  4.4     12-29  Mg     1.9     12-29

## 2019-12-29 NOTE — PROGRESS NOTE ADULT - ASSESSMENT
65 yo female with hx of CHFpEF, CAD, CKD, HTN, DM, cirrhosis, portal gastropathy, recurrent GI bleed, blind, sacral decub ulcer, presented to the ED from nursing home with encephalopathy, hypoxemia, hypotension, found to have anemia with Hb 5, possible UTI with sepsis, KEITH, hyperkalemia.      s/p Metabolic vs Uremic vs Hepatic Encephalopathy  resolved  likely multifactorial- Hepatic, Uremic  CT head negative, EEG without seizures   D/C NGT today and start clear diet PO     Acute on chronic diastolic CHF/ Acute hypoxic respiratory failure  sec to pulm edema  no longer requiring bipap  wean off Hi yazmin O2 today post HD    Acute blood loss Anemia   Responded to blood transfusion, no signs of active bleeding at this time.    Protonix, octreotide- total 5 days through 12/30/19, abx for SBP prophylaxis    Known CA[place]ED[place] Cirrhosis with portal gastropathy and AVMs  Lactulose and rifaximin  Octreotide, PPi and SBP coverage    KEITH/ ATN on CKD  likely sec to blood loss  Was briefly on dialysis in the past  Renal US showed no hydronephrosis  now again on HD per nephrology  will likely need long term HD    Sepsis  resolving well  right sided infiltrate on CXR, was on zosyn at nursing home being treated for the same, broadened to meropenem x 5 days by MICU team  unstageable sacral decub debrided on 12/28/19  COnt wound care    HTN, DM2  well controlled  Cont current regimen    ICDs

## 2019-12-30 LAB
ANION GAP SERPL CALC-SCNC: 11 MMOL/L — SIGNIFICANT CHANGE UP (ref 5–17)
BUN SERPL-MCNC: 24 MG/DL — HIGH (ref 8–20)
CALCIUM SERPL-MCNC: 7.4 MG/DL — LOW (ref 8.6–10.2)
CHLORIDE SERPL-SCNC: 100 MMOL/L — SIGNIFICANT CHANGE UP (ref 98–107)
CO2 SERPL-SCNC: 27 MMOL/L — SIGNIFICANT CHANGE UP (ref 22–29)
CREAT SERPL-MCNC: 2.41 MG/DL — HIGH (ref 0.5–1.3)
GLUCOSE BLDC GLUCOMTR-MCNC: 150 MG/DL — HIGH (ref 70–99)
GLUCOSE BLDC GLUCOMTR-MCNC: 157 MG/DL — HIGH (ref 70–99)
GLUCOSE BLDC GLUCOMTR-MCNC: 158 MG/DL — HIGH (ref 70–99)
GLUCOSE BLDC GLUCOMTR-MCNC: 190 MG/DL — HIGH (ref 70–99)
GLUCOSE BLDC GLUCOMTR-MCNC: 196 MG/DL — HIGH (ref 70–99)
GLUCOSE BLDC GLUCOMTR-MCNC: 253 MG/DL — HIGH (ref 70–99)
GLUCOSE SERPL-MCNC: 250 MG/DL — HIGH (ref 70–115)
HCT VFR BLD CALC: 23.7 % — LOW (ref 34.5–45)
HGB BLD-MCNC: 7.9 G/DL — LOW (ref 11.5–15.5)
MCHC RBC-ENTMCNC: 28.6 PG — SIGNIFICANT CHANGE UP (ref 27–34)
MCHC RBC-ENTMCNC: 33.3 GM/DL — SIGNIFICANT CHANGE UP (ref 32–36)
MCV RBC AUTO: 85.9 FL — SIGNIFICANT CHANGE UP (ref 80–100)
PLATELET # BLD AUTO: 157 K/UL — SIGNIFICANT CHANGE UP (ref 150–400)
POTASSIUM SERPL-MCNC: 3.4 MMOL/L — LOW (ref 3.5–5.3)
POTASSIUM SERPL-SCNC: 3.4 MMOL/L — LOW (ref 3.5–5.3)
RBC # BLD: 2.76 M/UL — LOW (ref 3.8–5.2)
RBC # FLD: 18.8 % — HIGH (ref 10.3–14.5)
SODIUM SERPL-SCNC: 138 MMOL/L — SIGNIFICANT CHANGE UP (ref 135–145)
WBC # BLD: 12.79 K/UL — HIGH (ref 3.8–10.5)
WBC # FLD AUTO: 12.79 K/UL — HIGH (ref 3.8–10.5)

## 2019-12-30 PROCEDURE — 99222 1ST HOSP IP/OBS MODERATE 55: CPT

## 2019-12-30 PROCEDURE — 99233 SBSQ HOSP IP/OBS HIGH 50: CPT

## 2019-12-30 PROCEDURE — 99232 SBSQ HOSP IP/OBS MODERATE 35: CPT

## 2019-12-30 RX ORDER — LACTULOSE 10 G/15ML
10 SOLUTION ORAL
Refills: 0 | Status: DISCONTINUED | OUTPATIENT
Start: 2019-12-30 | End: 2019-12-31

## 2019-12-30 RX ORDER — TRAMADOL HYDROCHLORIDE 50 MG/1
50 TABLET ORAL EVERY 8 HOURS
Refills: 0 | Status: DISCONTINUED | OUTPATIENT
Start: 2019-12-30 | End: 2020-01-01

## 2019-12-30 RX ORDER — OXYCODONE HYDROCHLORIDE 5 MG/1
10 TABLET ORAL ONCE
Refills: 0 | Status: DISCONTINUED | OUTPATIENT
Start: 2019-12-30 | End: 2019-12-30

## 2019-12-30 RX ADMIN — Medication 3 MILLILITER(S): at 04:02

## 2019-12-30 RX ADMIN — PANTOPRAZOLE SODIUM 40 MILLIGRAM(S): 20 TABLET, DELAYED RELEASE ORAL at 17:01

## 2019-12-30 RX ADMIN — CHLORHEXIDINE GLUCONATE 1 APPLICATION(S): 213 SOLUTION TOPICAL at 11:42

## 2019-12-30 RX ADMIN — LACTULOSE 20 GRAM(S): 10 SOLUTION ORAL at 17:01

## 2019-12-30 RX ADMIN — Medication 650 MILLIGRAM(S): at 09:06

## 2019-12-30 RX ADMIN — OXYCODONE HYDROCHLORIDE 10 MILLIGRAM(S): 5 TABLET ORAL at 11:25

## 2019-12-30 RX ADMIN — Medication 3 MILLILITER(S): at 20:29

## 2019-12-30 RX ADMIN — MIDODRINE HYDROCHLORIDE 10 MILLIGRAM(S): 2.5 TABLET ORAL at 16:34

## 2019-12-30 RX ADMIN — OXYCODONE HYDROCHLORIDE 10 MILLIGRAM(S): 5 TABLET ORAL at 10:25

## 2019-12-30 RX ADMIN — Medication 650 MILLIGRAM(S): at 10:00

## 2019-12-30 RX ADMIN — Medication 2: at 17:01

## 2019-12-30 RX ADMIN — Medication 2: at 01:29

## 2019-12-30 RX ADMIN — LACTULOSE 20 GRAM(S): 10 SOLUTION ORAL at 06:19

## 2019-12-30 RX ADMIN — MIDODRINE HYDROCHLORIDE 10 MILLIGRAM(S): 2.5 TABLET ORAL at 06:19

## 2019-12-30 RX ADMIN — Medication 6: at 11:39

## 2019-12-30 RX ADMIN — MEROPENEM 100 MILLIGRAM(S): 1 INJECTION INTRAVENOUS at 12:47

## 2019-12-30 RX ADMIN — Medication 3 MILLILITER(S): at 08:56

## 2019-12-30 RX ADMIN — Medication 3 MILLILITER(S): at 15:22

## 2019-12-30 RX ADMIN — Medication 1 APPLICATION(S): at 06:19

## 2019-12-30 RX ADMIN — PANTOPRAZOLE SODIUM 40 MILLIGRAM(S): 20 TABLET, DELAYED RELEASE ORAL at 06:18

## 2019-12-30 RX ADMIN — Medication 2: at 23:13

## 2019-12-30 NOTE — PROGRESS NOTE ADULT - SUBJECTIVE AND OBJECTIVE BOX
Anesthesia pre op note:    64 y old female for excisional debridement of sacral decubitus ulcer.   Patient seen, chart reviewed.    ECHO:  Summary:   1. Technically suboptimal study.   2. Left ventricular ejection fraction, by visual estimation, is >75%.   3. Hyperdynamic global left ventricular systolic function.   4. There is mild concentric left ventricular hypertrophy.   5. Mild mitral annular calcification.   6. Moderate mitral valve regurgitation.   7. Thickening and calcification of the anterior and posterior mitral   valve leaflets.   8. Moderate-severe tricuspid regurgitation.   9. Estimated pulmonary artery systolic pressure is 55.7 mmHg assuming a   right atrial pressure of 5 mmHg, which is consistent with moderate   pulmonary hypertension.  10. Endocardial visualization was enhanced with intravenous echo contrast.    [id number] Andrew Torres MD, Electronically signed on 2/15/2019 at 6:14:21 PM    For OR tomorrow.

## 2019-12-30 NOTE — PROGRESS NOTE ADULT - SUBJECTIVE AND OBJECTIVE BOX
[facility] DIVISION OF KIDNEY DISEASES AND HYPERTENSION -- HEMODIALYSIS NOTE  --------------------------------------------------------------------------------  Chief Complaint: ? ESRD/Ongoing hemodialysis requirement    24 hour events/subjective:  HD yesterday with 1.5 L UF   Pt seen and examined; awake but unable to provide ROS        PAST HISTORY  --------------------------------------------------------------------------------  No significant changes to PMH, PSH, FHx, SHx, unless otherwise noted    ALLERGIES & MEDICATIONS  --------------------------------------------------------------------------------  Allergies    ACE inhibitors (Other (Mild))    Intolerances    oxycodone (Sedation/Somnol)    Standing Inpatient Medications  albuterol/ipratropium for Nebulization. 3 milliLiter(s) Nebulizer every 6 hours  chlorhexidine 4% Liquid 1 Application(s) Topical <User Schedule>  collagenase Ointment 1 Application(s) Topical daily  dextrose 5%. 1000 milliLiter(s) IV Continuous <Continuous>  dextrose 50% Injectable 12.5 Gram(s) IV Push once  dextrose 50% Injectable 25 Gram(s) IV Push once  dextrose 50% Injectable 25 Gram(s) IV Push once  epoetin sara Injectable 72907 Unit(s) IV Push <User Schedule>  insulin lispro (HumaLOG) corrective regimen sliding scale   SubCutaneous every 6 hours  lactulose Syrup 20 Gram(s) Oral two times a day  meropenem  IVPB 500 milliGRAM(s) IV Intermittent every 24 hours  pantoprazole  Injectable 40 milliGRAM(s) IV Push two times a day  rifAXIMin 550 milliGRAM(s) Oral two times a day    PRN Inpatient Medications  acetaminophen   Tablet .. 650 milliGRAM(s) Oral every 6 hours PRN  dextrose 40% Gel 15 Gram(s) Oral once PRN  glucagon  Injectable 1 milliGRAM(s) IntraMuscular once PRN  midodrine. 10 milliGRAM(s) Oral three times a day PRN  traMADol 50 milliGRAM(s) Oral every 8 hours PRN      REVIEW OF SYSTEMS  --------------------------------------------------------------------------------  unable to obtain    VITALS/PHYSICAL EXAM  --------------------------------------------------------------------------------  T(C): 37.3 (12-30-19 @ 12:00), Max: 37.3 (12-30-19 @ 12:00)  HR: 78 (12-30-19 @ 12:00) (77 - 83)  BP: 89/51 (12-30-19 @ 12:00) (78/45 - 127/54)  RR: 16 (12-30-19 @ 12:00) (16 - 25)  SpO2: 99% (12-30-19 @ 12:00) (96% - 100%)  Wt(kg): --        12-29-19 @ 07:01  -  12-30-19 @ 07:00  --------------------------------------------------------  IN: 690 mL / OUT: 1500 mL / NET: -810 mL    12-30-19 @ 07:01  -  12-30-19 @ 13:46  --------------------------------------------------------  IN: 50 mL / OUT: 0 mL / NET: 50 mL      Physical Exam:  	Gen:  elderly woman, awake  	HEENT: on NC  	Pulm: CTA B/L  	CV: RRR, S1S2; no rub  	Back: unable to examine  	Abd: +BS, soft, nontender  	: no suprapubic tenderness  	UE: Warm, no edema  	LE: Warm, no edema  	Neuro: awake, non communicative  	Psych: unable to assess  	Skin: Warm  	Vascular access: RIJ non tunneled HD catheter      LABS/STUDIES  --------------------------------------------------------------------------------              9.0    14.27 >-----------<  160      [12-29-19 @ 06:42]              26.6     139  |  98  |  35.0  ----------------------------<  134      [12-29-19 @ 06:42]  3.6   |  22.0  |  2.65        Ca     8.2     [12-29-19 @ 06:42]      Mg     1.9     [12-29-19 @ 06:42]      Phos  4.4     [12-29-19 @ 06:42]            Iron 335, TIBC 419, %sat 80      [08-06-19 @ 14:55]  Ferritin 102      [08-06-19 @ 14:55]  PTH -- (Ca 9.1)      [05-30-19 @ 18:09]   107  PTH -- (Ca 7.9)      [01-17-19 @ 16:26]   175  PTH -- (Ca 8.1)      [01-16-19 @ 16:55]   148  Vitamin D (25OH) 31.1      [05-30-19 @ 18:17]  HbA1c 5.8      [12-26-19 @ 09:04]  TSH 1.10      [05-17-19 @ 12:21]    HBsAb 18.0      [12-26-19 @ 22:01]  HBsAg Nonreact      [12-26-19 @ 22:01]  HBcAb Nonreact      [12-26-19 @ 22:01]  HCV 0.20, Nonreact      [12-26-19 @ 22:01]

## 2019-12-30 NOTE — CONSULT NOTE ADULT - SUBJECTIVE AND OBJECTIVE BOX
[facility]  INFECTIOUS DISEASES AND INTERNAL MEDICINE at [place]  =======================================================  Matt Jackson MD  Diplomates American Board of Internal Medicine and Infectious Diseases  Telephone [phone]  Fax            [phone]  =======================================================    [id number]  KUN ROCHA   HPI:  65 y/o F with a h/o dCHF, moderate pHTN, CAD, CKD, HTN, DM, cirrhosis, portal gastropathy, recurrent GIB, blind, large sacral wound, presents to the ED from NH with AMS, hypoxemia, and hypotension. Patient had large melenotic BM in the ED. H&H: 5.8/17. Lactate: 2.7. KEITH on CKD. Hyperkalemic. Volume resuscitation began with 3u PRBC. Patient minimally responsive and moaning in pain. UA(+) with thick tan urine in fontenot. Developed progressive respiratory distress and hypoxemia requiring NIPPV. (26 Dec 2019 03:58)    Patient was managed by medical team for Encephalopathy, which was improving, thought to be multifactorial- Hepatic, Uremic. CT head negative, EEG without seizures   She also had  acute on chronic diastolic CHF/ Acute hypoxic respiratory failure.  For her acute blood loss Anemia , she responded to blood transfusion, no signs of active bleeding at this time.  She is on Protonix, octreotide- total 5 days through 19, abx for SBP prophylaxis.  She has known CASTAÑEDA Cirrhosis with portal gastropathy and AVMs, on Lactulose and rifaximin, followed by GI team.  For her KEITH/ATN on CKD, she has been getting intermittent dialysis in hospital.     During initial part of hospital stay, she had fever of T100 and is being treated for sepsis.  Her Ucx was negative and her Blood cx was also negative .  There was a right sided infiltrate on CXR, was on zosyn at nursing home being treated for the same. During admission, she was changed to meropenem x 5 days, with planned end date 20    She also has an unstageable sacral decub debrided on 19,  and pending possible OR debridement 19.     We are called for further antibiotic recommendations        patient was given pain meds prior to interview, unable to provide any information.  Per niece at bedside, patient was conversant yesterday. also had HD x 4 days in a row.      I have personally reviewed the labs and data; pertinent labs and data are listed in this note; please see below.   =======================================================  Past Medical & Surgical Hx:  =====================  PAST MEDICAL & SURGICAL HISTORY:  Legally blind in right eye, as defined in [place]  CAD in native artery  Chronic CHF  DM (diabetes mellitus), type 2  GERD (gastroesophageal reflux disease)  Hyperlipidemia  HTN (hypertension)  Congestive heart failure, unspecified HF chronicity, unspecified heart failure type  Insomnia  Retinal detachment  CAD (coronary artery disease)  Glaucoma  Urinary retention  Peripheral neuropathy  GERD (gastroesophageal reflux disease)  Depression  Diabetes  Hypertension, unspecified type  Herniated disc, cervical  PAD (peripheral artery disease)  Legally blind  History of peripheral vascular disease  History of retinal detachment  History of CEA (carotid endarterectomy): Right  Hyperlipidemia  Glaucoma  Hypertension  Diabetes  H/O carotid endarterectomy  S/P CABG (coronary artery bypass graft)  No significant past surgical history  S/P CABG x 1  After cataract  Uveitic glaucoma of both eyes  Detached retina  Atherosclerosis of right carotid artery   delivery delivered    Problem List:  ==========  HEALTH ISSUES - PROBLEM Dx:  Metabolic encephalopathy: Metabolic encephalopathy  UTI (urinary tract infection): UTI (urinary tract infection)  Diabetes: Diabetes  HTN (hypertension): HTN (hypertension)  Metabolic acidosis: Metabolic acidosis  Hyperkalemia: Hyperkalemia  KEITH (acute kidney injury): KEITH (acute kidney injury)  Acute blood loss anemia: Acute blood loss anemia  GIB (gastrointestinal bleeding): GIB (gastrointestinal bleeding)  Acute hypoxemic respiratory failure: Acute hypoxemic respiratory failure      Social Hx:  =======  no toxic habits currently    FAMILY HISTORY:  No pertinent family history in first degree relatives  FH: type 2 diabetes: mom and dad  no significant family history of immunosuppressive disorders in mother or father   =======================================================  REVIEW OF SYSTEMS:  Limited due to medical condition  =======================================================  Allergies  ACE inhibitors (Other (Mild))  oxycodone (Sedation/Somnol)      Antibiotics:  meropenem  IVPB 500 milliGRAM(s) IV Intermittent every 24 hours  rifAXIMin 550 milliGRAM(s) Oral two times a day    Other medications:  albuterol/ipratropium for Nebulization. 3 milliLiter(s) Nebulizer every 6 hours  chlorhexidine 4% Liquid 1 Application(s) Topical <User Schedule>  collagenase Ointment 1 Application(s) Topical daily  dextrose 5%. 1000 milliLiter(s) IV Continuous <Continuous>  dextrose 50% Injectable 12.5 Gram(s) IV Push once  dextrose 50% Injectable 25 Gram(s) IV Push once  dextrose 50% Injectable 25 Gram(s) IV Push once  epoetin sara Injectable 97570 Unit(s) IV Push <User Schedule>  insulin lispro (HumaLOG) corrective regimen sliding scale   SubCutaneous every 6 hours  lactulose Syrup 20 Gram(s) Oral two times a day  pantoprazole  Injectable 40 milliGRAM(s) IV Push two times a day     cefTRIAXone   IVPB  -   meropenem  IVPB  - current  piperacillin/tazobactam IVPB.      rifAXIMin   550 milliGRAM(s) Oral (19 @ 18:37)   550 milliGRAM(s) Oral (19 @ 05:03)   550 milliGRAM(s) Oral (19 @ 17:07)   550 milliGRAM(s) Oral (19 @ 06:19)      ======================================================  Physical Exam:  ============  T(F): 99.1 (30 Dec 2019 12:00), Max: 99.1 (30 Dec 2019 12:00)  HR: 78 (30 Dec 2019 12:00)  BP: 89/51 (30 Dec 2019 12:00)  RR: 16 (30 Dec 2019 12:00)  SpO2: 99% (30 Dec 2019 12:00) (96% - 100%)  temp max in last 48H T(F): , Max: 99.1 (19 @ 12:00)    General:  No acute distress.  OBESE  Eye: Pupils are equal, round and reactive to light, Extraocular movements are intact, Normal conjunctiva.  HENT: Normocephalic, Oral mucosa is DRY  Neck: Supple, No lymphadenopathy.  + RIGHT neck HD catheter  Respiratory: Lungs with coarse breath sound anteriorly  Cardiovascular: Normal rate, Regular rhythm,   + ANASARCA  Gastrointestinal: Soft, Non-tender, Non-distended, Normal bowel sounds.  Genitourinary: No costovertebral angle tenderness.  Lymphatics: No lymphadenopathy neck,   Musculoskeletal: no joint abnl  Integumentary: No rash.  Neurologic: sedated, not fully assessed  Psychiatric: not evaluated.    =======================================================  Labs:                        7.9    12.79 )-----------( 157      ( 30 Dec 2019 13:52 )             23.7       WBC Count: 12.79 K/uL (19 @ 13:52)  WBC Count: 14.27 K/uL (19 @ 06:42)  WBC Count: 14.93 K/uL (19 @ 14:53)  WBC Count: 14.55 K/uL (19 @ 05:01)  WBC Count: 14.85 K/uL (19 @ 18:05)  WBC Count: 14.23 K/uL (19 @ 06:09)  WBC Count: 12.82 K/uL (19 @ 18:42)  WBC Count: 12.11 K/uL (19 @ 09:03)  WBC Count: 11.45 K/uL (19 @ 03:04)  WBC Count: 10.28 K/uL (19 @ 00:47)          139  |  98  |  35.0<H>  ----------------------------<  134<H>  3.6   |  22.0  |  2.65<H>    Ca    8.2<L>      29 Dec 2019 06:42  Phos  4.4       Mg     1.9             Culture - Urine (collected 19 @ 02:26)  Source: .Urine  Final Report (19 @ 12:00):    Culture grew 3 or more types of organisms which indicate    collection contamination; consider recollection only if clinically    indicated.    ,    TYPE: (C=Critical, N=Notification, A=Abnormal) n    TESTS:  _ urine contamination    DATE/TIME CALLED: _ 2019 11:59:55    CALLED TO: _ darius meza rn    READ BACK (2 Patient Identifiers)(Y/N): _ y    READ BACK VALUES (Y/N): _ y    CALLED BY: _ nadira sanchez    Culture - Blood (collected 19 @ 00:45)  Source: .Blood    Culture - Blood (collected 19 @ 00:45)  Source: .Blood      Creatinine, Serum: 2.65 mg/dL (19 @ 06:42)  Creatinine, Serum: 3.48 mg/dL (19 @ 05:01)  Creatinine, Serum: 4.20 mg/dL (19 @ 06:09)  Creatinine, Serum: 3.67 mg/dL (19 @ 18:42)  Creatinine, Serum: 5.03 mg/dL (19 @ 09:03)  Creatinine, Serum: 4.84 mg/dL (19 @ 01:47)  Creatinine, Serum: 5.05 mg/dL (19 @ 00:47)

## 2019-12-30 NOTE — PROGRESS NOTE ADULT - ASSESSMENT
Morbidly obese diabetic with recent recurrent GI bleeds.  Prior CTA + for transverse colon bleed but negative colonoscopy.  EGD and colonoscopy already done several times this yr.    Plan continue support.  At high risk for endoscopic procedures.

## 2019-12-30 NOTE — PROGRESS NOTE ADULT - SUBJECTIVE AND OBJECTIVE BOX
HPI/OVERNIGHT EVENTS:    Patient unable to provide subjective, nonverbal. Wound examined with attending Dr. Worrell.     MEDICATIONS  (STANDING):  albuterol/ipratropium for Nebulization. 3 milliLiter(s) Nebulizer every 6 hours  chlorhexidine 2% Cloths 1 Application(s) Topical <User Schedule>  chlorhexidine 4% Liquid 1 Application(s) Topical <User Schedule>  collagenase Ointment 1 Application(s) Topical daily  dextrose 5%. 1000 milliLiter(s) (50 mL/Hr) IV Continuous <Continuous>  dextrose 50% Injectable 12.5 Gram(s) IV Push once  dextrose 50% Injectable 25 Gram(s) IV Push once  dextrose 50% Injectable 25 Gram(s) IV Push once  epoetin sara Injectable 86631 Unit(s) IV Push <User Schedule>  insulin lispro (HumaLOG) corrective regimen sliding scale   SubCutaneous every 6 hours  lactulose Syrup 20 Gram(s) Oral two times a day  meropenem  IVPB 500 milliGRAM(s) IV Intermittent every 24 hours  pantoprazole  Injectable 40 milliGRAM(s) IV Push two times a day  rifAXIMin 550 milliGRAM(s) Oral two times a day    MEDICATIONS  (PRN):  acetaminophen   Tablet .. 650 milliGRAM(s) Oral every 6 hours PRN Temp greater or equal to 38C (100.4F), Mild Pain (1 - 3)  dextrose 40% Gel 15 Gram(s) Oral once PRN Blood Glucose LESS THAN 70 milliGRAM(s)/deciLiter  glucagon  Injectable 1 milliGRAM(s) IntraMuscular once PRN Glucose <70 milliGRAM(s)/deciLiter  midodrine. 10 milliGRAM(s) Oral three times a day PRN if SBP 90 or less give 1 hour before HD      Vital Signs Last 24 Hrs  T(C): 36.7 (29 Dec 2019 19:02), Max: 37.1 (29 Dec 2019 08:52)  T(F): 98.1 (29 Dec 2019 19:02), Max: 98.7 (29 Dec 2019 08:52)  HR: 82 (30 Dec 2019 00:00) (77 - 108)  BP: 90/32 (30 Dec 2019 00:00) (78/45 - 127/54)  BP(mean): 44 (30 Dec 2019 00:00) (44 - 74)  RR: 25 (30 Dec 2019 00:00) (21 - 25)  SpO2: 99% (30 Dec 2019 00:00) (93% - 100%)    PE  gen: nad  heent: EOMI grossly   cv: rrr  resp: non-invasive positive pressure ventilation  gi: soft, nd, nttp  back: 6x6 sacral ulcer with necrotic skin borders w/p fibrinous tissue overlying sacrum.     I&O's Detail    28 Dec 2019 07:01  -  29 Dec 2019 07:00  --------------------------------------------------------  IN:    octreotide  Infusion: 230 mL    Packed Red Blood Cells: 280 mL    pantoprazole Infusion: 60 mL    Solution: 50 mL  Total IN: 620 mL    OUT:    Other: 1500 mL  Total OUT: 1500 mL    Total NET: -880 mL      29 Dec 2019 07:01  -  30 Dec 2019 03:43  --------------------------------------------------------  IN:    octreotide  Infusion: 40 mL    Oral Fluid: 600 mL    Solution: 50 mL  Total IN: 690 mL    OUT:    Other: 1500 mL  Total OUT: 1500 mL    Total NET: -810 mL          LABS:                        9.0    14.27 )-----------( 160      ( 29 Dec 2019 06:42 )             26.6     12-29    139  |  98  |  35.0<H>  ----------------------------<  134<H>  3.6   |  22.0  |  2.65<H>    Ca    8.2<L>      29 Dec 2019 06:42  Phos  4.4     12-29  Mg     1.9     12-29

## 2019-12-30 NOTE — PROGRESS NOTE ADULT - SUBJECTIVE AND OBJECTIVE BOX
Patient seen and examined; chart reviewed.  CVP removed.  No HD today but had received HD 4 days in a row over the weekend.  Hgb noted to drop from 9 to 7.9.  Had a brown BM cleaned by nurse today.  No melena.  No overt abdominal pain.  Lethargic and barely arouseable.  No vomiting.  Currently on Full liquid Renal diet.  Awaiting Debridement of the sacral decubitus in OR tomorrow.  Completed a 5 day course of IV Octreotide yesterday.  No bleeding with conclusion of this medication.  Still getting IVP of Pantoprazole 40 mg q 12 h.      PAST MEDICAL & SURGICAL HISTORY:  Legally blind in right eye, as defined in [place]  CAD in native artery  Chronic CHF  DM (diabetes mellitus), type 2  GERD (gastroesophageal reflux disease)  Hyperlipidemia  HTN (hypertension)  Congestive heart failure, unspecified HF chronicity, unspecified heart failure type  Insomnia  Retinal detachment  CAD (coronary artery disease)  Glaucoma  Urinary retention  Peripheral neuropathy  GERD (gastroesophageal reflux disease)  Depression  Diabetes  Hypertension, unspecified type  Herniated disc, cervical  PAD (peripheral artery disease)  Legally blind  History of peripheral vascular disease  History of retinal detachment  History of CEA (carotid endarterectomy): Right  Hyperlipidemia  Glaucoma  Hypertension  Diabetes  H/O carotid endarterectomy  S/P CABG (coronary artery bypass graft)  No significant past surgical history  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  No dyspepsia  No abdominal pain.    No Nausea, vomiting.  No Bleeding.  No hematemesis.   No diarrhea.    No hematochesia.  No weight loss, anorexia.  No edema.      MEDICATIONS  (STANDING):  albuterol/ipratropium for Nebulization. 3 milliLiter(s) Nebulizer every 6 hours  chlorhexidine 4% Liquid 1 Application(s) Topical <User Schedule>  collagenase Ointment 1 Application(s) Topical daily  dextrose 5%. 1000 milliLiter(s) (50 mL/Hr) IV Continuous <Continuous>  dextrose 50% Injectable 12.5 Gram(s) IV Push once  dextrose 50% Injectable 25 Gram(s) IV Push once  dextrose 50% Injectable 25 Gram(s) IV Push once  epoetin sara Injectable 01163 Unit(s) IV Push <User Schedule>  insulin lispro (HumaLOG) corrective regimen sliding scale   SubCutaneous every 6 hours  lactulose Syrup 10 Gram(s) Oral two times a day  meropenem  IVPB 500 milliGRAM(s) IV Intermittent every 24 hours  pantoprazole  Injectable 40 milliGRAM(s) IV Push two times a day  rifAXIMin 550 milliGRAM(s) Oral two times a day    MEDICATIONS  (PRN):  acetaminophen   Tablet .. 650 milliGRAM(s) Oral every 6 hours PRN Temp greater or equal to 38C (100.4F), Mild Pain (1 - 3)  dextrose 40% Gel 15 Gram(s) Oral once PRN Blood Glucose LESS THAN 70 milliGRAM(s)/deciLiter  glucagon  Injectable 1 milliGRAM(s) IntraMuscular once PRN Glucose <70 milliGRAM(s)/deciLiter  midodrine. 10 milliGRAM(s) Oral three times a day PRN if SBP 90 or less give 1 hour before HD  traMADol 50 milliGRAM(s) Oral every 8 hours PRN Severe Pain (7 - 10)      Allergies    ACE inhibitors (Other (Mild))    Intolerances    oxycodone (Sedation/Somnol)      Vital Signs Last 24 Hrs  T(C): 37.1 (30 Dec 2019 17:05), Max: 37.3 (30 Dec 2019 12:00)  T(F): 98.7 (30 Dec 2019 17:05), Max: 99.1 (30 Dec 2019 12:00)  HR: 81 (30 Dec 2019 16:42) (73 - 83)  BP: 108/49 (30 Dec 2019 16:42) (86/53 - 127/54)  BP(mean): 64 (30 Dec 2019 04:00) (44 - 74)  RR: 22 (30 Dec 2019 16:42) (16 - 25)  SpO2: 100% (30 Dec 2019 16:42) (96% - 100%)    PHYSICAL EXAM:    GENERAL: NAD, well-groomed, well-developed  HEAD:  Atraumatic, Normocephalic  EYES: EOMI, PERRLA, conjunctiva and right corneal ulcer, very large.    ENMT: No tonsillar erythema, exudates, or enlargement; Moist mucous membranes, Good dentition, No lesions  NECK: Supple, No JVD, Normal thyroid  CHEST/LUNG: Clear to percussion bilaterally; No rales, rhonchi, wheezing, or rubs  HEART: Regular rate and rhythm; No murmurs, rubs, or gallops  ABDOMEN: Soft, Nontender, Nondistended; Bowel sounds present;  Morbidly obese.    EXTREMITIES:  2+ Peripheral Pulses, No clubbing, cyanosis, or edema  LYMPH: No lymphadenopathy noted  SKIN: No rashes or lesions      LABS:                        7.9    12.79 )-----------( 157      ( 30 Dec 2019 13:52 )             23.7     12-30    138  |  100  |  24.0<H>  ----------------------------<  250<H>  3.4<L>   |  27.0  |  2.41<H>    Ca    7.4<L>      30 Dec 2019 13:52  Phos  4.4       Mg     1.9                    RADIOLOGY & ADDITIONAL STUDIES:

## 2019-12-30 NOTE — PROGRESS NOTE ADULT - ASSESSMENT
63 yo female with hx of CHFpEF, CAD, CKD, HTN, DM, cirrhosis, portal gastropathy, recurrent GI bleed, blind, sacral decub ulcer, presented to the ED from nursing home with encephalopathy, hypoxemia, hypotension, found to have anemia with Hb 5, possible UTI with sepsis, KEITH, hyperkalemia.      s/p Metabolic vs Uremic vs Hepatic Encephalopathy  resolved  likely multifactorial- Hepatic, Uremic  CT head negative, EEG without seizures   D/C NGT yesterday and started clear diet yesterday, will advance to full liquid today     Acute on chronic diastolic CHF/ Acute hypoxic respiratory failure  sec to pulm edema  no longer requiring bipap  now weaned off  Hi yazmin O2 and tolerating nasal cannula    Acute blood loss Anemia   Responded to blood transfusion, no signs of active bleeding at this time.    Protonix, octreotide- total 5 days through 12/30/19, abx for SBP prophylaxis    Known CASTAÑEDA Cirrhosis with portal gastropathy and AVMs  Lactulose and rifaximin  Octreotide, PPi and SBP coverage    KEITH/ATN on CKD  likely sec to blood loss  Was briefly on dialysis in the past  Renal US showed no hydronephrosis  now again on HD per nephrology via left IJ  discussed w Dr Castro- pt will likely need long term HD, eventual plan for permacath/AVF    Sepsis  resolving well  Ucx negative- Blood cx negative   right sided infiltrate on CXR, was on zosyn at nursing home being treated for the same, broadened to meropenem x 5 days by MICU team- end date 1/1/20  unstageable sacral decub debrided on 12/28/19  Cont wound care  Surgery following- pt is s/p bedside debridement X 2 and planning for possible OR debridement tomorrow 12/31  ID consulted for any further antibiotic recs     HTN, DM2  well controlled  Cont current regimen    ICDs    Dispo:Discussed pt's poor functional status w  Pito and sister Lorena. I asked family members to come in for meeting w Palliative Care team/hospitalist team tomorrow at 1 pm and family agreed to meet to discuss GOC. This was discussed by me w POLLO Phillips. 65 yo female with hx of CHFpEF, CAD, CKD, HTN, DM, cirrhosis, portal gastropathy, recurrent GI bleed, blind, sacral decub ulcer, presented to the ED from nursing home with encephalopathy, hypoxemia, hypotension, found to have anemia with Hb 5, possible UTI with sepsis, KEITH, hyperkalemia.      s/p Metabolic vs Uremic vs Hepatic Encephalopathy  resolved  likely multifactorial- Hepatic, Uremic  CT head negative, EEG without seizures   D/C NGT yesterday and started clear diet yesterday, will advance to full liquid today     Acute on chronic diastolic CHF/ Acute hypoxic respiratory failure  sec to pulm edema  no longer requiring bipap  now weaned off Hi yazmin O2 and tolerating nasal cannula    Acute blood loss Anemia   Responded to blood transfusion, no signs of active bleeding at this time.    Protonix, octreotide- total 5 days through 12/30/19, abx for SBP prophylaxis    Known CASTAÑEDA Cirrhosis with portal gastropathy and AVMs  Lactulose and rifaximin  Octreotide, PPi and SBP coverage    KEITH/ATN on CKD  likely sec to blood loss  Was briefly on dialysis in the past  Renal US showed no hydronephrosis  now again on HD per nephrology via left IJ  discussed w Dr Castro- pt will likely need long term HD, eventual plan for permacath/ AVF    Sepsis  resolving well  Ucx negative- Blood cx negative   right sided infiltrate on CXR, was on zosyn at nursing home being treated for the same, broadened to meropenem x 5 days by MICU team- end date 1/1/20  unstageable sacral decub debrided on 12/28/19  Cont wound care  Surgery following- pt is s/p bedside debridement X 2 and planning for possible OR debridement tomorrow 12/31  ID consulted for any further antibiotic recs     HTN, DM2  well controlled  Cont current regimen    ICDs    Patient at baseline is bed bound and almost functional quadriplegia, with a large unstageable sacral decub and low possibility of healing well given bed bound status, does not even sit in chair, morbid obesity. It is prudent to have GoC conversation  Discussed pt's poor functional status w  Pito and sister Lorena. I asked family members to come in for meeting w Palliative Care team/hospitalist team tomorrow at 1 pm and family agreed to meet to discuss GOC. This was discussed by me w POLLO Phillips.      Dispo- IF renal fxn improves might not need o/p HD. IF not improved will need new HD seat in the community 63 yo female with hx of CHFpEF, CAD, CKD, HTN, DM, cirrhosis, portal gastropathy, recurrent GI bleed, blind, sacral decub ulcer, presented to the ED from nursing home with encephalopathy, hypoxemia, hypotension, found to have anemia with Hb 5, possible UTI with sepsis, KEITH, hyperkalemia.      s/p Metabolic vs Uremic vs Hepatic Encephalopathy  resolved  likely multifactorial- Hepatic, Uremic  CT head negative, EEG without seizures   D/C NGT yesterday and started clear diet yesterday, will advance to full liquid today     Acute on chronic diastolic CHF/ Acute hypoxic respiratory failure  sec to pulm edema  no longer requiring bipap  now weaned off Hi yazmin O2 and tolerating nasal cannula    Acute blood loss Anemia   Responded to blood transfusion, no signs of active bleeding at this time.    Protonix, octreotide- total 5 days through 12/30/19, abx for SBP prophylaxis    Known CASTAÑEDA Cirrhosis with portal gastropathy and AVMs  Lactulose and rifaximin  Octreotide, PPi and SBP coverage    KEITH/ATN on CKD  likely sec to blood loss  Was briefly on dialysis in the past  Renal US showed no hydronephrosis  now again on HD per nephrology via left IJ  discussed w Dr Castro- pt will likely need long term HD, eventual plan for permacath/ AVF    Sepsis  resolving well  Ucx negative- Blood cx negative   right sided infiltrate on CXR, was on zosyn at nursing home being treated for the same, broadened to meropenem x 5 days by MICU team- end date 1/1/20  unstageable sacral decub debrided on 12/28/19  Cont wound care  Surgery following- pt is s/p bedside debridement X 2 and planning for possible OR debridement tomorrow 12/31  ID consulted for any further antibiotic recs     HTN, DM2  well controlled  Cont current regimen    ICDs    Patient at baseline is bed bound and almost functional quadriplegia, with a large unstageable sacral decub and low possibility of healing well given bed bound status, does not even sit in chair, morbid obesity. It is prudent to have GoC conversation  Discussed pt's poor functional status w  Pito and sister Lorena. I asked family members to come in for meeting w Palliative Care team/hospitalist team tomorrow at 1 pm and family agreed to meet to discuss GOC. This was discussed by me w POLLO Phillips.      Dispo- IF renal fxn improves might not need o/p HD. IF not improved will need new HD seat in the community. Still requiring daily HD 63 yo female with hx of CHFpEF, CAD, CKD, HTN, DM, cirrhosis, portal gastropathy, recurrent GI bleed, blind, sacral decub ulcer, presented to the ED from nursing home with encephalopathy, hypoxemia, hypotension, found to have anemia with Hb 5, possible UTI with sepsis, KEITH, hyperkalemia.      s/p Metabolic vs Uremic vs Hepatic Encephalopathy  resolved  likely multifactorial- Hepatic, Uremic  CT head negative, EEG without seizures   D/C NGT yesterday and started clear diet yesterday, will advance to full liquid today     Hypokalemia  to be addressed with HD today    Acute on chronic diastolic CHF/ Acute hypoxic respiratory failure  sec to pulm edema  no longer requiring bipap  now weaned off Hi yazmin O2 and tolerating nasal cannula    Acute blood loss Anemia   Responded to blood transfusion, no signs of active bleeding at this time.    Protonix, octreotide- total 5 days through 12/30/19, abx for SBP prophylaxis    Known CASTAÑEDA Cirrhosis with portal gastropathy and AVMs  Lactulose and rifaximin  Octreotide, PPi and SBP coverage    KEITH/ATN on CKD  likely sec to blood loss  Was briefly on dialysis in the past  Renal US showed no hydronephrosis  now again on HD per nephrology via left IJ  discussed w Dr Castro- pt will likely need long term HD, eventual plan for permacath/ AVF    Sepsis  resolving well  Ucx negative- Blood cx negative   right sided infiltrate on CXR, was on zosyn at nursing home being treated for the same, broadened to meropenem x 5 days by MICU team- end date 1/1/20  unstageable sacral decub debrided on 12/28/19  Cont wound care  Surgery following- pt is s/p bedside debridement X 2 and planning for possible OR debridement tomorrow 12/31  ID consulted for any further antibiotic recs     HTN, DM2  well controlled  Cont current regimen    ICDs    Patient at baseline is bed bound and almost functional quadriplegia, with a large unstageable sacral decub and low possibility of healing well given bed bound status, does not even sit in chair, morbid obesity. It is prudent to have GoC conversation  Discussed pt's poor functional status w  Pito and sister Lorena. I asked family members to come in for meeting w Palliative Care team/hospitalist team tomorrow at 1 pm and family agreed to meet to discuss GOC. This was discussed by me w POLLO Phillips.      Dispo- IF renal fxn improves might not need o/p HD. IF not improved will need new HD seat in the community. Still requiring daily HD

## 2019-12-30 NOTE — CONSULT NOTE ADULT - ASSESSMENT
This  63 y/o F with a h/o dCHF, moderate pHTN, CAD, CKD, HTN, DM, cirrhosis, portal gastropathy, recurrent GIB, blind, large sacral wound, presents to the ED from NH with AMS, hypoxemia, and hypotension. Patient had large melenotic BM in the ED. H&H: 5.8/17. Lactate: 2.7. KEITH on CKD. Hyperkalemic. Volume resuscitation began with 3u PRBC. Patient minimally responsive and moaning in pain. UA(+) with thick tan urine in fontenot. Developed progressive respiratory distress and hypoxemia requiring NIPPV. (26 Dec 2019 03:58)    Patient was managed by medical team for Encephalopathy, which was improving, thought to be multifactorial- Hepatic, Uremic. CT head negative, EEG without seizures   She also had  acute on chronic diastolic CHF/ Acute hypoxic respiratory failure.  For her acute blood loss Anemia , she responded to blood transfusion, no signs of active bleeding at this time.  She is on Protonix, octreotide- total 5 days through 12/30/19, abx for SBP prophylaxis.  She has known CASTAÑEDA Cirrhosis with portal gastropathy and AVMs, on Lactulose and rifaximin, followed by GI team.  For her KEITH/ATN on CKD, she has been getting intermittent dialysis in hospital.     During initial part of hospital stay, she had fever of T100 and is being treated for sepsis.  Her Ucx was negative and her Blood cx was also negative .  There was a right sided infiltrate on CXR, was on zosyn at nursing home being treated for the same. During admission, she was changed to meropenem x 5 days, with planned end date 1/1/20    She also has an unstageable sacral decub debrided on 12/28/19,  and pending possible OR debridement 12/31/19.     We are called for further antibiotic recommendations          Impression:  fevers,   possible sepsis  recent PNA at SNF    - fevers since resolved.   Cultures remain negative  - patient had been on gram negative coverage with Ceftriaxone --> Zosyn --> Merrem   - agree with completion of course on  1/1/2020.   observe off antibiotics thereafter    - follow up all outstanding cultures  - trend temperature and WBC curve  - repeat cultures from blood and all sources if febrile.

## 2019-12-30 NOTE — PROGRESS NOTE ADULT - SUBJECTIVE AND OBJECTIVE BOX
JOSEFINA KUN Female 64y [id number]    Patient is a 64y old  Female who presents with a chief complaint of Acute hypoxemic respiratory failure, GIB (30 Dec 2019 03:43)      Subjective/objective:  Pt seen and examined at bedside, no over night event reported by night staff. Pt reports doing well and has no new complaints.    Review of system:  No fever, chills, nausea, vomiting, headache, dizziness, chest pain, SOB or palpitation.    PHYSICAL EXAM:    Vital Signs Last 24 Hrs  T(C): 36.6 (30 Dec 2019 08:00), Max: 37.1 (29 Dec 2019 16:03)  T(F): 97.9 (30 Dec 2019 08:00), Max: 98.7 (29 Dec 2019 16:03)  HR: 82 (30 Dec 2019 08:00) (77 - 83)  BP: 90/36 (30 Dec 2019 08:00) (78/45 - 127/54)  BP(mean): 64 (30 Dec 2019 04:00) (44 - 74)  RR: 17 (30 Dec 2019 08:00) (17 - 25)  SpO2: 96% (30 Dec 2019 08:00) (96% - 100%)       PHYSICAL EXAM  GENERAL: Awake, conversant, following commands, on nasal cannula  HEAD:  Atraumatic, Normocephalic  EYES: , conjunctiva and sclera clear, right sided blind/white eye  ENT: Moist mucous membranes,  No lesions  NECK: Supple, No JVD, Normal thyroid  NERVOUS SYSTEM:  Awake, lethargic, Moving extremities, answering questions appropriately at times  CHEST/LUNG: Diminished but CTA bilaterally; No rales, rhonchi, wheezing, or rubs  HEART: Regular rate and rhythm; No murmurs, rubs, or gallops  ABDOMEN: Soft, Nontender, Nondistended; Bowel sounds present, Large Sacral Decub ulcer unstageable s/p debridement yesterday  EXTREMITIES:  2+ Peripheral Pulses, No clubbing, cyanosis, or edema      MEDICATIONS  (STANDING):  albuterol/ipratropium for Nebulization. 3 milliLiter(s) Nebulizer every 6 hours  chlorhexidine 2% Cloths 1 Application(s) Topical <User Schedule>  chlorhexidine 4% Liquid 1 Application(s) Topical <User Schedule>  collagenase Ointment 1 Application(s) Topical daily  dextrose 5%. 1000 milliLiter(s) (50 mL/Hr) IV Continuous <Continuous>  dextrose 50% Injectable 12.5 Gram(s) IV Push once  dextrose 50% Injectable 25 Gram(s) IV Push once  dextrose 50% Injectable 25 Gram(s) IV Push once  epoetin sara Injectable 87933 Unit(s) IV Push <User Schedule>  insulin lispro (HumaLOG) corrective regimen sliding scale   SubCutaneous every 6 hours  lactulose Syrup 20 Gram(s) Oral two times a day  meropenem  IVPB 500 milliGRAM(s) IV Intermittent every 24 hours  pantoprazole  Injectable 40 milliGRAM(s) IV Push two times a day  rifAXIMin 550 milliGRAM(s) Oral two times a day    MEDICATIONS  (PRN):  acetaminophen   Tablet .. 650 milliGRAM(s) Oral every 6 hours PRN Temp greater or equal to 38C (100.4F), Mild Pain (1 - 3)  dextrose 40% Gel 15 Gram(s) Oral once PRN Blood Glucose LESS THAN 70 milliGRAM(s)/deciLiter  glucagon  Injectable 1 milliGRAM(s) IntraMuscular once PRN Glucose <70 milliGRAM(s)/deciLiter  midodrine. 10 milliGRAM(s) Oral three times a day PRN if SBP 90 or less give 1 hour before HD  traMADol 50 milliGRAM(s) Oral every 8 hours PRN Severe Pain (7 - 10)        Labs:  LABS:                        9.0    14.27 )-----------( 160      ( 29 Dec 2019 06:42 )             26.6     12-29    139  |  98  |  35.0<H>  ----------------------------<  134<H>  3.6   |  22.0  |  2.65<H>    Ca    8.2<L>      29 Dec 2019 06:42  Phos  4.4     12-29  Mg     1.9     12-29 Patient is a 64y old  Female who presents with a chief complaint of Acute hypoxemic respiratory failure, GIB       Subjective/objective:  Pt seen and examined at bedside, no over night event reported by night staff. Pt reports doing well and has no new complaints.    Review of system:  No fever, chills, nausea, vomiting, headache, dizziness, chest pain, SOB or palpitation.    PHYSICAL EXAM:    Vital Signs Last 24 Hrs  T(C): 36.6 (30 Dec 2019 08:00), Max: 37.1 (29 Dec 2019 16:03)  T(F): 97.9 (30 Dec 2019 08:00), Max: 98.7 (29 Dec 2019 16:03)  HR: 82 (30 Dec 2019 08:00) (77 - 83)  BP: 90/36 (30 Dec 2019 08:00) (78/45 - 127/54)  BP(mean): 64 (30 Dec 2019 04:00) (44 - 74)  RR: 17 (30 Dec 2019 08:00) (17 - 25)  SpO2: 96% (30 Dec 2019 08:00) (96% - 100%)       PHYSICAL EXAM  GENERAL: Awake, conversant, following commands, on nasal cannula  HEAD:  Atraumatic, Normocephalic  EYES: , conjunctiva and sclera clear, right sided blind/white eye  ENT: Moist mucous membranes,  No lesions  NECK: Supple, No JVD, Normal thyroid  NERVOUS SYSTEM:  Awake, lethargic, Moving extremities, answering questions appropriately at times  CHEST/LUNG: Diminished but CTA bilaterally; No rales, rhonchi, wheezing, or rubs  HEART: Regular rate and rhythm; No murmurs, rubs, or gallops  ABDOMEN: Soft, Nontender, Nondistended; Bowel sounds present, Large Sacral Decub ulcer unstageable s/p debridement yesterday  EXTREMITIES:  2+ Peripheral Pulses, No clubbing, cyanosis, or edema      MEDICATIONS  (STANDING):  albuterol/ipratropium for Nebulization. 3 milliLiter(s) Nebulizer every 6 hours  chlorhexidine 2% Cloths 1 Application(s) Topical <User Schedule>  chlorhexidine 4% Liquid 1 Application(s) Topical <User Schedule>  collagenase Ointment 1 Application(s) Topical daily  dextrose 5%. 1000 milliLiter(s) (50 mL/Hr) IV Continuous <Continuous>  dextrose 50% Injectable 12.5 Gram(s) IV Push once  dextrose 50% Injectable 25 Gram(s) IV Push once  dextrose 50% Injectable 25 Gram(s) IV Push once  epoetin sara Injectable 99362 Unit(s) IV Push <User Schedule>  insulin lispro (HumaLOG) corrective regimen sliding scale   SubCutaneous every 6 hours  lactulose Syrup 20 Gram(s) Oral two times a day  meropenem  IVPB 500 milliGRAM(s) IV Intermittent every 24 hours  pantoprazole  Injectable 40 milliGRAM(s) IV Push two times a day  rifAXIMin 550 milliGRAM(s) Oral two times a day    MEDICATIONS  (PRN):  acetaminophen   Tablet .. 650 milliGRAM(s) Oral every 6 hours PRN Temp greater or equal to 38C (100.4F), Mild Pain (1 - 3)  dextrose 40% Gel 15 Gram(s) Oral once PRN Blood Glucose LESS THAN 70 milliGRAM(s)/deciLiter  glucagon  Injectable 1 milliGRAM(s) IntraMuscular once PRN Glucose <70 milliGRAM(s)/deciLiter  midodrine. 10 milliGRAM(s) Oral three times a day PRN if SBP 90 or less give 1 hour before HD  traMADol 50 milliGRAM(s) Oral every 8 hours PRN Severe Pain (7 - 10)        Labs:  LABS:                        9.0    14.27 )-----------( 160      ( 29 Dec 2019 06:42 )             26.6     12-29    139  |  98  |  35.0<H>  ----------------------------<  134<H>  3.6   |  22.0  |  2.65<H>    Ca    8.2<L>      29 Dec 2019 06:42  Phos  4.4     12-29  Mg     1.9     12-29 Patient is a 64y old  Female who presents with a chief complaint of Acute hypoxemic respiratory failure, GIB       Subjective/objective:  Pt seen and examined at bedside, no over night event reported by night staff. Pt reports doing well and has no new complaints.    Review of system:  No fever, chills, nausea, vomiting, headache, dizziness, chest pain, SOB or palpitation.    PHYSICAL EXAM:    Vital Signs Last 24 Hrs  T(C): 36.6 (30 Dec 2019 08:00), Max: 37.1 (29 Dec 2019 16:03)  T(F): 97.9 (30 Dec 2019 08:00), Max: 98.7 (29 Dec 2019 16:03)  HR: 82 (30 Dec 2019 08:00) (77 - 83)  BP: 90/36 (30 Dec 2019 08:00) (78/45 - 127/54)  BP(mean): 64 (30 Dec 2019 04:00) (44 - 74)  RR: 17 (30 Dec 2019 08:00) (17 - 25)  SpO2: 96% (30 Dec 2019 08:00) (96% - 100%)       PHYSICAL EXAM  GENERAL: Awake, conversant, following commands, on nasal cannula, anasarca  HEAD:  Atraumatic, Normocephalic  EYES: , conjunctiva and sclera clear, right sided blind/white eye  ENT: Moist mucous membranes,  No lesions  NECK: Supple, No JVD, Normal thyroid  NERVOUS SYSTEM:  Awake, lethargic, Moving extremities, answering questions appropriately at times  CHEST/LUNG: Diminished but CTA bilaterally; No rales, rhonchi, wheezing, or rubs  HEART: Regular rate and rhythm; No murmurs, rubs, or gallops  ABDOMEN: Soft, Nontender, Nondistended; Bowel sounds present, Large Sacral Decub ulcer unstageable s/p debridement yesterday  EXTREMITIES:  2+ Peripheral Pulses, No clubbing, cyanosis, or edema      MEDICATIONS  (STANDING):  albuterol/ipratropium for Nebulization. 3 milliLiter(s) Nebulizer every 6 hours  chlorhexidine 2% Cloths 1 Application(s) Topical <User Schedule>  chlorhexidine 4% Liquid 1 Application(s) Topical <User Schedule>  collagenase Ointment 1 Application(s) Topical daily  dextrose 5%. 1000 milliLiter(s) (50 mL/Hr) IV Continuous <Continuous>  dextrose 50% Injectable 12.5 Gram(s) IV Push once  dextrose 50% Injectable 25 Gram(s) IV Push once  dextrose 50% Injectable 25 Gram(s) IV Push once  epoetin sara Injectable 98525 Unit(s) IV Push <User Schedule>  insulin lispro (HumaLOG) corrective regimen sliding scale   SubCutaneous every 6 hours  lactulose Syrup 20 Gram(s) Oral two times a day  meropenem  IVPB 500 milliGRAM(s) IV Intermittent every 24 hours  pantoprazole  Injectable 40 milliGRAM(s) IV Push two times a day  rifAXIMin 550 milliGRAM(s) Oral two times a day    MEDICATIONS  (PRN):  acetaminophen   Tablet .. 650 milliGRAM(s) Oral every 6 hours PRN Temp greater or equal to 38C (100.4F), Mild Pain (1 - 3)  dextrose 40% Gel 15 Gram(s) Oral once PRN Blood Glucose LESS THAN 70 milliGRAM(s)/deciLiter  glucagon  Injectable 1 milliGRAM(s) IntraMuscular once PRN Glucose <70 milliGRAM(s)/deciLiter  midodrine. 10 milliGRAM(s) Oral three times a day PRN if SBP 90 or less give 1 hour before HD  traMADol 50 milliGRAM(s) Oral every 8 hours PRN Severe Pain (7 - 10)        Labs:  LABS:                        9.0    14.27 )-----------( 160      ( 29 Dec 2019 06:42 )             26.6     12-29    139  |  98  |  35.0<H>  ----------------------------<  134<H>  3.6   |  22.0  |  2.65<H>    Ca    8.2<L>      29 Dec 2019 06:42  Phos  4.4     12-29  Mg     1.9     12-29

## 2019-12-30 NOTE — PROGRESS NOTE ADULT - ASSESSMENT
A/P:    64yoF with hypovolemic shock for possible upper GI bleed, hypoxemia, blood loss anemia, metabolic acidosis, UTI, KEITH on CKD with an un-stageable sacral ulcer - s/p bedside debridements NOW twice BY SURGERY TEAM      - daily dressing changes with santyl and concurrent bedside debridement per surgery  - possible OR debridement on Tuesday by Dr. Worrell.     DISPO as per primary team

## 2019-12-30 NOTE — PROGRESS NOTE ADULT - ASSESSMENT
1) KEITH on CKD now likely ESRD  2) encephalopathy  4) Acute blood loss anemia superimposed on anemia of CKD  5) Hypotension      HD today with 1.5-2 L UF as tolerated  Continue Midodrine   Continue QUENTIN with HD  Monitor lytes, BP and UOP  Vascular for AVF creation  Will follow

## 2019-12-31 DIAGNOSIS — S31.000A UNSPECIFIED OPEN WOUND OF LOWER BACK AND PELVIS WITHOUT PENETRATION INTO RETROPERITONEUM, INITIAL ENCOUNTER: ICD-10-CM

## 2019-12-31 DIAGNOSIS — R53.81 OTHER MALAISE: ICD-10-CM

## 2019-12-31 DIAGNOSIS — Z51.5 ENCOUNTER FOR PALLIATIVE CARE: ICD-10-CM

## 2019-12-31 DIAGNOSIS — K92.2 GASTROINTESTINAL HEMORRHAGE, UNSPECIFIED: ICD-10-CM

## 2019-12-31 DIAGNOSIS — R41.82 ALTERED MENTAL STATUS, UNSPECIFIED: ICD-10-CM

## 2019-12-31 LAB
AMMONIA BLD-MCNC: 52 UMOL/L — SIGNIFICANT CHANGE UP (ref 11–55)
ANION GAP SERPL CALC-SCNC: 9 MMOL/L — SIGNIFICANT CHANGE UP (ref 5–17)
APTT BLD: 31.1 SEC — SIGNIFICANT CHANGE UP (ref 27.5–36.3)
BLD GP AB SCN SERPL QL: SIGNIFICANT CHANGE UP
BUN SERPL-MCNC: 26 MG/DL — HIGH (ref 8–20)
CALCIUM SERPL-MCNC: 7.7 MG/DL — LOW (ref 8.6–10.2)
CHLORIDE SERPL-SCNC: 100 MMOL/L — SIGNIFICANT CHANGE UP (ref 98–107)
CO2 SERPL-SCNC: 27 MMOL/L — SIGNIFICANT CHANGE UP (ref 22–29)
CREAT SERPL-MCNC: 2.68 MG/DL — HIGH (ref 0.5–1.3)
CULTURE RESULTS: SIGNIFICANT CHANGE UP
CULTURE RESULTS: SIGNIFICANT CHANGE UP
GLUCOSE BLDC GLUCOMTR-MCNC: 136 MG/DL — HIGH (ref 70–99)
GLUCOSE BLDC GLUCOMTR-MCNC: 152 MG/DL — HIGH (ref 70–99)
GLUCOSE BLDC GLUCOMTR-MCNC: 167 MG/DL — HIGH (ref 70–99)
GLUCOSE BLDC GLUCOMTR-MCNC: 194 MG/DL — HIGH (ref 70–99)
GLUCOSE SERPL-MCNC: 147 MG/DL — HIGH (ref 70–115)
HCT VFR BLD CALC: 25.2 % — LOW (ref 34.5–45)
HGB BLD-MCNC: 8.4 G/DL — LOW (ref 11.5–15.5)
INR BLD: 1.37 RATIO — HIGH (ref 0.88–1.16)
MCHC RBC-ENTMCNC: 28.5 PG — SIGNIFICANT CHANGE UP (ref 27–34)
MCHC RBC-ENTMCNC: 33.3 GM/DL — SIGNIFICANT CHANGE UP (ref 32–36)
MCV RBC AUTO: 85.4 FL — SIGNIFICANT CHANGE UP (ref 80–100)
PLATELET # BLD AUTO: 151 K/UL — SIGNIFICANT CHANGE UP (ref 150–400)
POTASSIUM SERPL-MCNC: 3.8 MMOL/L — SIGNIFICANT CHANGE UP (ref 3.5–5.3)
POTASSIUM SERPL-SCNC: 3.8 MMOL/L — SIGNIFICANT CHANGE UP (ref 3.5–5.3)
PROTHROM AB SERPL-ACNC: 15.9 SEC — HIGH (ref 10–12.9)
RBC # BLD: 2.95 M/UL — LOW (ref 3.8–5.2)
RBC # FLD: 19.1 % — HIGH (ref 10.3–14.5)
SODIUM SERPL-SCNC: 136 MMOL/L — SIGNIFICANT CHANGE UP (ref 135–145)
SPECIMEN SOURCE: SIGNIFICANT CHANGE UP
SPECIMEN SOURCE: SIGNIFICANT CHANGE UP
WBC # BLD: 13.2 K/UL — HIGH (ref 3.8–10.5)
WBC # FLD AUTO: 13.2 K/UL — HIGH (ref 3.8–10.5)

## 2019-12-31 PROCEDURE — 99233 SBSQ HOSP IP/OBS HIGH 50: CPT

## 2019-12-31 PROCEDURE — 99497 ADVNCD CARE PLAN 30 MIN: CPT | Mod: 25

## 2019-12-31 PROCEDURE — 99221 1ST HOSP IP/OBS SF/LOW 40: CPT

## 2019-12-31 PROCEDURE — 99222 1ST HOSP IP/OBS MODERATE 55: CPT

## 2019-12-31 PROCEDURE — 99232 SBSQ HOSP IP/OBS MODERATE 35: CPT

## 2019-12-31 PROCEDURE — 99497 ADVNCD CARE PLAN 30 MIN: CPT

## 2019-12-31 RX ORDER — LACTULOSE 10 G/15ML
20 SOLUTION ORAL THREE TIMES A DAY
Refills: 0 | Status: DISCONTINUED | OUTPATIENT
Start: 2019-12-31 | End: 2020-01-01

## 2019-12-31 RX ORDER — VANCOMYCIN HCL 1 G
1000 VIAL (EA) INTRAVENOUS ONCE
Refills: 0 | Status: COMPLETED | OUTPATIENT
Start: 2019-12-31 | End: 2019-12-31

## 2019-12-31 RX ORDER — PANTOPRAZOLE SODIUM 20 MG/1
40 TABLET, DELAYED RELEASE ORAL
Refills: 0 | Status: DISCONTINUED | OUTPATIENT
Start: 2019-12-31 | End: 2020-01-01

## 2019-12-31 RX ADMIN — Medication 3 MILLILITER(S): at 03:42

## 2019-12-31 RX ADMIN — Medication 1 APPLICATION(S): at 05:12

## 2019-12-31 RX ADMIN — TRAMADOL HYDROCHLORIDE 50 MILLIGRAM(S): 50 TABLET ORAL at 16:28

## 2019-12-31 RX ADMIN — MEROPENEM 100 MILLIGRAM(S): 1 INJECTION INTRAVENOUS at 13:24

## 2019-12-31 RX ADMIN — Medication 2: at 17:54

## 2019-12-31 RX ADMIN — Medication 2: at 13:23

## 2019-12-31 RX ADMIN — Medication 3 MILLILITER(S): at 16:54

## 2019-12-31 RX ADMIN — Medication 3 MILLILITER(S): at 20:33

## 2019-12-31 RX ADMIN — TRAMADOL HYDROCHLORIDE 50 MILLIGRAM(S): 50 TABLET ORAL at 17:00

## 2019-12-31 RX ADMIN — PANTOPRAZOLE SODIUM 40 MILLIGRAM(S): 20 TABLET, DELAYED RELEASE ORAL at 05:10

## 2019-12-31 RX ADMIN — Medication 2: at 21:15

## 2019-12-31 RX ADMIN — LACTULOSE 20 GRAM(S): 10 SOLUTION ORAL at 10:16

## 2019-12-31 RX ADMIN — CHLORHEXIDINE GLUCONATE 1 APPLICATION(S): 213 SOLUTION TOPICAL at 05:11

## 2019-12-31 RX ADMIN — LACTULOSE 20 GRAM(S): 10 SOLUTION ORAL at 21:15

## 2019-12-31 RX ADMIN — Medication 3 MILLILITER(S): at 09:41

## 2019-12-31 RX ADMIN — PANTOPRAZOLE SODIUM 40 MILLIGRAM(S): 20 TABLET, DELAYED RELEASE ORAL at 17:54

## 2019-12-31 NOTE — CONSULT NOTE ADULT - SUBJECTIVE AND OBJECTIVE BOX
This is a Palliative Care Consult  <HPI:  63 y/o F with a h/o dCHF, moderate pHTN, CAD, CKD, HTN, DM, cirrhosis, portal gastropathy, recurrent GIB, blind, large sacral wound, presents to the ED from NH with AMS, hypoxemia, and hypotension. Patient had large melenotic BM in the ED. H&H: 5.8/17. Lactate: 2.7. KEITH on CKD. Hyperkalemic. Volume resuscitation began with 3u PRBC. Patient minimally responsive and moaning in pain. UA(+) with thick tan urine in fontenot. Developed progressive respiratory distress and hypoxemia requiring NIPPV. (26 Dec 2019 03:58)> end of copied text     PERTINENT PMH REVIEWED: Yes   PAST MEDICAL & SURGICAL HISTORY:  [place]ally [place] in right [place], as [place] in [place]  CAD in native artery  Chronic CHF  DM (diabetes mellitus), type 2  GERD (gastroesophageal reflux disease)  Hyperlipidemia  HTN (hypertension)  Congestive heart failure, unspecified HF chronicity, unspecified heart failure type  Insomnia  Retinal detachment  CAD (coronary artery disease)  Glaucoma  Urinary retention  Peripheral neuropathy  GERD (gastroesophageal reflux disease)  Depression  Diabetes  Hypertension, unspecified type  Herniated disc, cervical  PAD (peripheral artery disease)  Legally blind  History of peripheral vascular disease  History of retinal detachment  History of CEA (carotid endarterectomy): Right  Hyperlipidemia  Glaucoma  Hypertension  Diabetes  H/O carotid endarterectomy  S/P CABG (coronary artery bypass graft)  No significant past surgical history  S/P CABG x 1  After cataract  Uveitic glaucoma of both eyes  Detached retina  Atherosclerosis of right carotid artery   delivery delivered      SOCIAL HISTORY:                                     Admitted from:  home     Surrogate/HCP/Guardian: Phone#: Gentry Alas [phone]    FAMILY HISTORY:  No pertinent family history in first degree relatives  FH: type 2 diabetes: mom and dad      Baseline ADLs (prior to admission):  Independent    Allergies  ACE inhibitors (Other (Mild))    Intolerances  oxycodone (Sedation/Somnol)    Present Symptoms:   Dyspnea: 1  Nausea/Vomiting: Patient denies but difficult to obtain due to poor mentation   Anxiety:  Patient denies but difficult to obtain due to poor mentation   Depression: Patient denies but difficult to obtain due to poor mentation    Fatigue: Yes   Loss of appetite: Yes     Pain: Patient c/o generalized pain             Character-            Duration-            Effect-            Factors-            Frequency-            Location-            Severity-    Review of Systems: Reviewed  Difficult to obtain due to poor mentation   All others negative    MEDICATIONS  (STANDING):  albuterol/ipratropium for Nebulization. 3 milliLiter(s) Nebulizer every 6 hours  chlorhexidine 4% Liquid 1 Application(s) Topical <User Schedule>  collagenase Ointment 1 Application(s) Topical daily  dextrose 5%. 1000 milliLiter(s) (50 mL/Hr) IV Continuous <Continuous>  dextrose 50% Injectable 12.5 Gram(s) IV Push once  dextrose 50% Injectable 25 Gram(s) IV Push once  dextrose 50% Injectable 25 Gram(s) IV Push once  epoetin sara Injectable 73634 Unit(s) IV Push <User Schedule>  insulin lispro (HumaLOG) corrective regimen sliding scale   SubCutaneous every 6 hours  lactulose Syrup 20 Gram(s) Oral three times a day  meropenem  IVPB 500 milliGRAM(s) IV Intermittent every 24 hours  pantoprazole  Injectable 40 milliGRAM(s) IV Push two times a day  rifAXIMin 550 milliGRAM(s) Oral two times a day  vancomycin  IVPB 1000 milliGRAM(s) IV Intermittent once    MEDICATIONS  (PRN):  acetaminophen   Tablet .. 650 milliGRAM(s) Oral every 6 hours PRN Temp greater or equal to 38C (100.4F), Mild Pain (1 - 3)  dextrose 40% Gel 15 Gram(s) Oral once PRN Blood Glucose LESS THAN 70 milliGRAM(s)/deciLiter  glucagon  Injectable 1 milliGRAM(s) IntraMuscular once PRN Glucose <70 milliGRAM(s)/deciLiter  midodrine. 10 milliGRAM(s) Oral three times a day PRN if SBP 90 or less give 1 hour before HD  traMADol 50 milliGRAM(s) Oral every 8 hours PRN Severe Pain (7 - 10)      PHYSICAL EXAM:    Vital Signs Last 24 Hrs  T(C): 37 (31 Dec 2019 08:27), Max: 37.9 (31 Dec 2019 08:05)  T(F): 98.6 (31 Dec 2019 08:27), Max: 100.2 (31 Dec 2019 08:05)  HR: 79 (31 Dec 2019 09:44) (73 - 89)  BP: 104/53 (31 Dec 2019 08:27) (84/64 - 108/49)  BP(mean): 68 (31 Dec 2019 08:27) (57 - 72)  RR: 11 (31 Dec 2019 08:27) (11 - 22)  SpO2: 100% (31 Dec 2019 09:44) (95% - 100%)    General: alert  oriented x __2-3, able to verify name, date of birth, location but confused to situation    Karnofsky:  %30    HEENT: normal      Lungs: mild dyspnea    CV: normal      GI: incontinent    : po    MSK: weakness , edema +2 generalized    Skin: thin frail ecchymotic skin , pressure ulcers- Stage__UNSTAGEABLE___     LABS:                        8.4    13.20 )-----------( 151      ( 31 Dec 2019 05:13 )             25.2         136  |  100  |  26.0<H>  ----------------------------<  147<H>  3.8   |  27.0  |  2.68<H>    Ca    7.7<L>      31 Dec 2019 05:13  PT/INR - ( 31 Dec 2019 05:13 )   PT: 15.9 sec;   INR: 1.37 ratio    PTT - ( 31 Dec 2019 05:13 )  PTT:31.1 sec  I&O's Summary  30 Dec 2019 07:01  -  31 Dec 2019 07:00  --------------------------------------------------------  IN: 890 mL / OUT: 0 mL / NET: 890 mL        RADIOLOGY & ADDITIONAL STUDIES:  CXR 19  FINDINGS:  Single frontal view of the chest demonstrates diffuse bilateral infiltrates versus mild CHF, right worse than left. Small right-sided pleural effusion, unchanged. NG tube courses below the hemidiaphragm. Right-sided central venous catheter is redemonstrated. The cardiomediastinal silhouette is enlarged. No acute osseous abnormalities. Overlying EKG leads and wires are noted    IMPRESSION: NG tube courses below the hemidiaphragm.    CT chest 19    IMPRESSION:   Right upper lobe consolidation with additional smaller right upper lobe opacities, likely pneumonia.  Small bilateral pleural effusions with groundglass attenuation throughout the majority of the lungs and interlobular septal thickening, likely pulmonary edema.  Right lower lobe opacity with an appearance suggestive of rounded atelectasis.  Patchy left upper lobe opacities; differentialincludes infectious/inflammatory etiologies or pulmonary edema.  New 7 mm left upper lobe pulmonary nodule.  Diffuse anasarca.  A follow-up noncontrast chest CT is recommended in 6 weeks following treatment.    ECHO 02.15.19  Summary:   1. Technically suboptimal study.   2. Left ventricular ejection fraction, by visual estimation, is >75%.   3. Hyperdynamic global left ventricular systolic function.   4. There is mild concentric left ventricular hypertrophy.   5. Mild mitral annular calcification.   6. Moderate mitral valve regurgitation.   7. Thickening and calcification of the anterior and posterior mitral   valve leaflets.   8. Moderate-severe tricuspid regurgitation.   9. Estimated pulmonary artery systolic pressure is 55.7 mmHg assuming a   right atrial pressure of 5 mmHg, which is consistent with moderate   pulmonary hypertension.  10. Endocardial visualization was enhanced with intravenous echo contrast.    ADVANCE DIRECTIVES:   Full Code This is a Palliative Care Consult  <HPI:  65 y/o F with a h/o dCHF, moderate pHTN, CAD, CKD, HTN, DM, cirrhosis, portal gastropathy, recurrent GIB, blind, large sacral wound, presents to the ED from NH with AMS, hypoxemia, and hypotension. Patient had large melenotic BM in the ED. H&H: 5.8/17. Lactate: 2.7. KEITH on CKD. Hyperkalemic. Volume resuscitation began with 3u PRBC. Patient minimally responsive and moaning in pain. UA(+) with thick tan urine in fontenot. Developed progressive respiratory distress and hypoxemia requiring NIPPV. (26 Dec 2019 03:58)> end of copied text     PERTINENT PMH REVIEWED: Yes   PAST MEDICAL & SURGICAL HISTORY:  [place]ally blind in right [place], as [place] in [place]  CAD in native artery  Chronic CHF  DM (diabetes mellitus), type 2  GERD (gastroesophageal reflux disease)  Hyperlipidemia  HTN (hypertension)  Congestive heart failure, unspecified HF chronicity, unspecified heart failure type  Insomnia  Retinal detachment  CAD (coronary artery disease)  Glaucoma  Urinary retention  Peripheral neuropathy  GERD (gastroesophageal reflux disease)  Depression  Diabetes  Hypertension, unspecified type  Herniated disc, cervical  PAD (peripheral artery disease)  Legally blind  History of peripheral vascular disease  History of retinal detachment  History of CEA (carotid endarterectomy): Right  Hyperlipidemia  Glaucoma  Hypertension  Diabetes  H/O carotid endarterectomy  S/P CABG (coronary artery bypass graft)  No significant past surgical history  S/P CABG x 1  After cataract  Uveitic glaucoma of both eyes  Detached retina  Atherosclerosis of right carotid artery   delivery delivered      SOCIAL HISTORY:                                     Admitted from:  home     Surrogate/HCP/Guardian: Phone#: Morgan Alas [phone]    FAMILY HISTORY:  No pertinent family history in first degree relatives  FH: type 2 diabetes: mom and dad      Baseline ADLs (prior to admission):  Independent    Allergies  ACE inhibitors (Other (Mild))    Intolerances  oxycodone (Sedation/Somnol)    Present Symptoms:   Dyspnea: 1  Nausea/Vomiting: Patient denies but difficult to obtain due to poor mentation   Anxiety:  Patient denies but difficult to obtain due to poor mentation   Depression: Patient denies but difficult to obtain due to poor mentation    Fatigue: Yes   Loss of appetite: Yes     Pain: Patient c/o generalized pain             Character-            Duration-            Effect-            Factors-            Frequency-            Location-            Severity-    Review of Systems: Reviewed  Difficult to obtain due to poor mentation   All others negative    MEDICATIONS  (STANDING):  albuterol/ipratropium for Nebulization. 3 milliLiter(s) Nebulizer every 6 hours  chlorhexidine 4% Liquid 1 Application(s) Topical <User Schedule>  collagenase Ointment 1 Application(s) Topical daily  dextrose 5%. 1000 milliLiter(s) (50 mL/Hr) IV Continuous <Continuous>  dextrose 50% Injectable 12.5 Gram(s) IV Push once  dextrose 50% Injectable 25 Gram(s) IV Push once  dextrose 50% Injectable 25 Gram(s) IV Push once  epoetin sara Injectable 92246 Unit(s) IV Push <User Schedule>  insulin lispro (HumaLOG) corrective regimen sliding scale   SubCutaneous every 6 hours  lactulose Syrup 20 Gram(s) Oral three times a day  meropenem  IVPB 500 milliGRAM(s) IV Intermittent every 24 hours  pantoprazole  Injectable 40 milliGRAM(s) IV Push two times a day  rifAXIMin 550 milliGRAM(s) Oral two times a day  vancomycin  IVPB 1000 milliGRAM(s) IV Intermittent once    MEDICATIONS  (PRN):  acetaminophen   Tablet .. 650 milliGRAM(s) Oral every 6 hours PRN Temp greater or equal to 38C (100.4F), Mild Pain (1 - 3)  dextrose 40% Gel 15 Gram(s) Oral once PRN Blood Glucose LESS THAN 70 milliGRAM(s)/deciLiter  glucagon  Injectable 1 milliGRAM(s) IntraMuscular once PRN Glucose <70 milliGRAM(s)/deciLiter  midodrine. 10 milliGRAM(s) Oral three times a day PRN if SBP 90 or less give 1 hour before HD  traMADol 50 milliGRAM(s) Oral every 8 hours PRN Severe Pain (7 - 10)      PHYSICAL EXAM:    Vital Signs Last 24 Hrs  T(C): 37 (31 Dec 2019 08:27), Max: 37.9 (31 Dec 2019 08:05)  T(F): 98.6 (31 Dec 2019 08:27), Max: 100.2 (31 Dec 2019 08:05)  HR: 79 (31 Dec 2019 09:44) (73 - 89)  BP: 104/53 (31 Dec 2019 08:27) (84/64 - 108/49)  BP(mean): 68 (31 Dec 2019 08:27) (57 - 72)  RR: 11 (31 Dec 2019 08:27) (11 - 22)  SpO2: 100% (31 Dec 2019 09:44) (95% - 100%)    General: alert  oriented x __2-3, able to verify name, date of birth, location but confused to situation    Karnofsky:  %30    HEENT: normal      Lungs: mild dyspnea    CV: normal      GI: incontinent    : po    MSK: weakness , edema +2 generalized    Skin: thin frail ecchymotic skin , pressure ulcers- Stage__UNSTAGEABLE___     LABS:                        8.4    13.20 )-----------( 151      ( 31 Dec 2019 05:13 )             25.2         136  |  100  |  26.0<H>  ----------------------------<  147<H>  3.8   |  27.0  |  2.68<H>    Ca    7.7<L>      31 Dec 2019 05:13  PT/INR - ( 31 Dec 2019 05:13 )   PT: 15.9 sec;   INR: 1.37 ratio    PTT - ( 31 Dec 2019 05:13 )  PTT:31.1 sec  I&O's Summary  30 Dec 2019 07:01  -  31 Dec 2019 07:00  --------------------------------------------------------  IN: 890 mL / OUT: 0 mL / NET: 890 mL        RADIOLOGY & ADDITIONAL STUDIES:  CXR 19  FINDINGS:  Single frontal view of the chest demonstrates diffuse bilateral infiltrates versus mild CHF, right worse than left. Small right-sided pleural effusion, unchanged. NG tube courses below the hemidiaphragm. Right-sided central venous catheter is redemonstrated. The cardiomediastinal silhouette is enlarged. No acute osseous abnormalities. Overlying EKG leads and wires are noted    IMPRESSION: NG tube courses below the hemidiaphragm.    CT chest 19    IMPRESSION:   Right upper lobe consolidation with additional smaller right upper lobe opacities, likely pneumonia.  Small bilateral pleural effusions with groundglass attenuation throughout the majority of the lungs and interlobular septal thickening, likely pulmonary edema.  Right lower lobe opacity with an appearance suggestive of rounded atelectasis.  Patchy left upper lobe opacities; differentialincludes infectious/inflammatory etiologies or pulmonary edema.  New 7 mm left upper lobe pulmonary nodule.  Diffuse anasarca.  A follow-up noncontrast chest CT is recommended in 6 weeks following treatment.    ECHO 02.15.19  Summary:   1. Technically suboptimal study.   2. Left ventricular ejection fraction, by visual estimation, is >75%.   3. Hyperdynamic global left ventricular systolic function.   4. There is mild concentric left ventricular hypertrophy.   5. Mild mitral annular calcification.   6. Moderate mitral valve regurgitation.   7. Thickening and calcification of the anterior and posterior mitral   valve leaflets.   8. Moderate-severe tricuspid regurgitation.   9. Estimated pulmonary artery systolic pressure is 55.7 mmHg assuming a   right atrial pressure of 5 mmHg, which is consistent with moderate   pulmonary hypertension.  10. Endocardial visualization was enhanced with intravenous echo contrast.    ADVANCE DIRECTIVES:   Full Code This is a Palliative Care Consult  This is a 64 year old female PMHX dCHF, pHTN, CAD, CKD, HTN, cirrhosis, recurrent GIB, blindness, large sacralwound, admitted to [facility] from NH with altered mental status, hypoxemia, hypotension Patient was transfused with PRBc, in and out of consciousness, and developed progressive respiratory distress requiring NIPPV    <HPI:  65 y/o F with a h/o dCHF, moderate pHTN, CAD, CKD, HTN, DM, cirrhosis, portal gastropathy, recurrent GIB, blind, large sacral wound, presents to the ED from NH with AMS, hypoxemia, and hypotension. Patient had large melenotic BM in the ED. H&H: 5.8/17. Lactate: 2.7. KEITH on CKD. Hyperkalemic. Volume resuscitation began with 3u PRBC. Patient minimally responsive and moaning in pain. UA(+) with thick tan urine in fontenot. Developed progressive respiratory distress and hypoxemia requiring NIPPV. (26 Dec 2019 03:58)> end of copied text     PERTINENT PMH REVIEWED: Yes   PAST MEDICAL & SURGICAL HISTORY:  [place]ally blind in right [place], as [place] in [place]  CAD in native artery  Chronic CHF  DM (diabetes mellitus), type 2  GERD (gastroesophageal reflux disease)  Hyperlipidemia  HTN (hypertension)  Congestive heart failure, unspecified HF chronicity, unspecified heart failure type  Insomnia  Retinal detachment  CAD (coronary artery disease)  Glaucoma  Urinary retention  Peripheral neuropathy  GERD (gastroesophageal reflux disease)  Depression  Diabetes  Hypertension, unspecified type  Herniated disc, cervical  PAD (peripheral artery disease)  Legally blind  History of peripheral vascular disease  History of retinal detachment  History of CEA (carotid endarterectomy): Right  Hyperlipidemia  Glaucoma  Hypertension  Diabetes  H/O carotid endarterectomy  S/P CABG (coronary artery bypass graft)  No significant past surgical history  S/P CABG x 1  After cataract  Uveitic glaucoma of both eyes  Detached retina  Atherosclerosis of right carotid artery   delivery delivered      SOCIAL HISTORY:                                     Admitted from:  home     Surrogate/HCP/Guardian: Phone#: Morgan Alas [phone]    FAMILY HISTORY:  No pertinent family history in first degree relatives  FH: type 2 diabetes: mom and dad      Baseline ADLs (prior to admission):  Independent    Allergies  ACE inhibitors (Other (Mild))    Intolerances  oxycodone (Sedation/Somnol)    Present Symptoms:   Dyspnea: 1  Nausea/Vomiting: Patient denies but difficult to obtain due to poor mentation   Anxiety:  Patient denies but difficult to obtain due to poor mentation   Depression: Patient denies but difficult to obtain due to poor mentation    Fatigue: Yes   Loss of appetite: Yes     Pain: Patient c/o generalized pain             Character-            Duration-            Effect-            Factors-            Frequency-            Location-            Severity-    Review of Systems: Reviewed  Difficult to obtain due to poor mentation   All others negative    MEDICATIONS  (STANDING):  albuterol/ipratropium for Nebulization. 3 milliLiter(s) Nebulizer every 6 hours  chlorhexidine 4% Liquid 1 Application(s) Topical <User Schedule>  collagenase Ointment 1 Application(s) Topical daily  dextrose 5%. 1000 milliLiter(s) (50 mL/Hr) IV Continuous <Continuous>  dextrose 50% Injectable 12.5 Gram(s) IV Push once  dextrose 50% Injectable 25 Gram(s) IV Push once  dextrose 50% Injectable 25 Gram(s) IV Push once  epoetin sara Injectable 06217 Unit(s) IV Push <User Schedule>  insulin lispro (HumaLOG) corrective regimen sliding scale   SubCutaneous every 6 hours  lactulose Syrup 20 Gram(s) Oral three times a day  meropenem  IVPB 500 milliGRAM(s) IV Intermittent every 24 hours  pantoprazole  Injectable 40 milliGRAM(s) IV Push two times a day  rifAXIMin 550 milliGRAM(s) Oral two times a day  vancomycin  IVPB 1000 milliGRAM(s) IV Intermittent once    MEDICATIONS  (PRN):  acetaminophen   Tablet .. 650 milliGRAM(s) Oral every 6 hours PRN Temp greater or equal to 38C (100.4F), Mild Pain (1 - 3)  dextrose 40% Gel 15 Gram(s) Oral once PRN Blood Glucose LESS THAN 70 milliGRAM(s)/deciLiter  glucagon  Injectable 1 milliGRAM(s) IntraMuscular once PRN Glucose <70 milliGRAM(s)/deciLiter  midodrine. 10 milliGRAM(s) Oral three times a day PRN if SBP 90 or less give 1 hour before HD  traMADol 50 milliGRAM(s) Oral every 8 hours PRN Severe Pain (7 - 10)      PHYSICAL EXAM:    Vital Signs Last 24 Hrs  T(C): 37 (31 Dec 2019 08:27), Max: 37.9 (31 Dec 2019 08:05)  T(F): 98.6 (31 Dec 2019 08:27), Max: 100.2 (31 Dec 2019 08:05)  HR: 79 (31 Dec 2019 09:44) (73 - 89)  BP: 104/53 (31 Dec 2019 08:27) (84/64 - 108/49)  BP(mean): 68 (31 Dec 2019 08:27) (57 - 72)  RR: 11 (31 Dec 2019 08:27) (11 - 22)  SpO2: 100% (31 Dec 2019 09:44) (95% - 100%)    General: alert  oriented x __2-3, able to verify name, date of birth, location but confused to situation    Karnofsky:  %30    HEENT: normal      Lungs: mild dyspnea    CV: normal      GI: incontinent    : fontenot    MSK: weakness , edema +2 generalized    Skin: thin frail ecchymotic skin , pressure ulcers- Stage__UNSTAGEABLE___     LABS:                        8.4    13.20 )-----------( 151      ( 31 Dec 2019 05:13 )             25.2         136  |  100  |  26.0<H>  ----------------------------<  147<H>  3.8   |  27.0  |  2.68<H>    Ca    7.7<L>      31 Dec 2019 05:13  PT/INR - ( 31 Dec 2019 05:13 )   PT: 15.9 sec;   INR: 1.37 ratio    PTT - ( 31 Dec 2019 05:13 )  PTT:31.1 sec  I&O's Summary  30 Dec 2019 07:01  -  31 Dec 2019 07:00  --------------------------------------------------------  IN: 890 mL / OUT: 0 mL / NET: 890 mL        RADIOLOGY & ADDITIONAL STUDIES:  CXR 19  FINDINGS:  Single frontal view of the chest demonstrates diffuse bilateral infiltrates versus mild CHF, right worse than left. Small right-sided pleural effusion, unchanged. NG tube courses below the hemidiaphragm. Right-sided central venous catheter is redemonstrated. The cardiomediastinal silhouette is enlarged. No acute osseous abnormalities. Overlying EKG leads and wires are noted    IMPRESSION: NG tube courses below the hemidiaphragm.    CT chest 19    IMPRESSION:   Right upper lobe consolidation with additional smaller right upper lobe opacities, likely pneumonia.  Small bilateral pleural effusions with groundglass attenuation throughout the majority of the lungs and interlobular septal thickening, likely pulmonary edema.  Right lower lobe opacity with an appearance suggestive of rounded atelectasis.  Patchy left upper lobe opacities; differentialincludes infectious/inflammatory etiologies or pulmonary edema.  New 7 mm left upper lobe pulmonary nodule.  Diffuse anasarca.  A follow-up noncontrast chest CT is recommended in 6 weeks following treatment.    ECHO 02.15.19  Summary:   1. Technically suboptimal study.   2. Left ventricular ejection fraction, by visual estimation, is >75%.   3. Hyperdynamic global left ventricular systolic function.   4. There is mild concentric left ventricular hypertrophy.   5. Mild mitral annular calcification.   6. Moderate mitral valve regurgitation.   7. Thickening and calcification of the anterior and posterior mitral   valve leaflets.   8. Moderate-severe tricuspid regurgitation.   9. Estimated pulmonary artery systolic pressure is 55.7 mmHg assuming a   right atrial pressure of 5 mmHg, which is consistent with moderate   pulmonary hypertension.  10. Endocardial visualization was enhanced with intravenous echo contrast.    ADVANCE DIRECTIVES:   Full Code

## 2019-12-31 NOTE — PROGRESS NOTE ADULT - ASSESSMENT
65 yo female with hx of CHFpEF, CAD, CKD, HTN, DM, cirrhosis, portal gastropathy, recurrent GI bleed, blind, sacral decub ulcer, presented to the ED from nursing home with encephalopathy, hypoxic resp failure requiring bipap, in ED found to have anemia with Hb 5 that responded to blood transfusions (hx of recurrent GI bleed for which multiple EGDs/colonoscopies have been performed, no further invasive work up as per GI), sepsis secondary to PNA on abx, KEITH on CKD now requiring HD via left IJ.     1. S/p Metabolic vs Uremic vs Hepatic Encephalopathy  Persistent, lethargic, awakens to sternal rubs, minimally responsive  Likely multifactorial - Hepatic, Uremic   Pt received pain meds yesterday 12/30/19, became very lethargic from the pain meds. Narcs were dced. Pts oral meds were held and pt did not receive her 2 scheduled doses of lactulose due to lethargy.  Pt previously had NGT however she  was seen by S&S and was cleared for a liquid diet and NGT was dced. Will get stat ammonia level and increase lactulose to 20 TID and give stat dose now. If signs of aspiration/too lethargic to swallow will need to place NGT for med administration   CT head negative, EEG without seizures      2. Acute on chronic diastolic CHF/ Acute hypoxic respiratory failure  Sec to pulm edema  No longer requiring bipap and now weaned off Hi yazmin O2 and tolerating nasal cannula  Continue to taper supplemental NC o2 as able    3. Acute blood loss Anemia   Hx of recurrent GI bleed? prior EGDs/colonoscopies this year which were negative, prior CTA with +transverse colon bleed  Seen by GI on this admission, no invasive intervention   Responded to blood transfusions, last transfusion 12/28   No signs of active bleeding at this time.    Protonix, octreotide- total 5 days through 12/30/19 (last day today)  Avoid chemical DVT PPE for now     4. Known CASTAÑEDA Cirrhosis with portal gastropathy and AVMs  Lactulose and rifaximin  Octreotide, PPI    5. KEITH/ATN on CKD  Likely sec to blood loss  Was briefly on dialysis in the past  Renal US showed no hydronephrosis  Seen in consult by nephrology, now again on HD per nephrology via left IJ  No HD today as per nephro, will plan for HD Thursday   Discussed w Dr Castro - pt will likely need long term HD, eventual plan for PermCath / AVF will consult vascular team today     6. Sepsis  Secondary to PNA, b/l infiltrates on CT R>L  (previously treated with zosyn at NH)  Abx broadened to merrem x 5 days, last day 1/1/20  Ucx negative- Blood cx negative   Unstageable sacral decub debrided x 2 by surgical team, planning for OR debridement today   Cont wound care  ID consulted for any further antibiotic recs     7.  DM2  Well controlled  Cont current regimen    8. Hypotension  Avoid BP meds  Midodrine for now     9. Lung Nodule  Incidental finding on CT  Will need outpatient follow up with pulm for repeat imaging in 6 months     ICDs    Patient at baseline is bed bound and almost functional quadriplegia, with a large unstageable sacral decub and low possibility of healing well given bed bound status, does not even sit in chair, morbid obesity. It is prudent to have GoC conversation.  Palliative Care team/hospitalist team meeting set up for today at 1pm? 63 yo female with hx of CHFpEF, CAD, CKD, HTN, DM, cirrhosis, portal gastropathy, recurrent GI bleed, blind, sacral decub ulcer, presented to the ED from nursing home with encephalopathy, hypoxic resp failure requiring bipap, in ED found to have anemia with Hb 5 that responded to blood transfusions (hx of recurrent GI bleed for which multiple EGDs/colonoscopies have been performed, no further invasive work up as per GI), sepsis secondary to PNA on abx, KEITH on CKD now requiring HD via left IJ.     1. S/p Metabolic vs Uremic vs Hepatic Encephalopathy  Persistent, lethargic, awakens to sternal rubs, minimally responsive  Likely multifactorial - Hepatic, Uremic   Pt received pain meds yesterday 12/30/19, became very lethargic from the pain meds. Narcs were dced. Pts oral meds were held and pt did not receive her 2 scheduled doses of lactulose due to lethargy.  Pt previously had NGT however she  was seen by S&S and was cleared for a liquid diet and NGT was dced. Will get stat ammonia level and increase lactulose to 20 TID and give stat dose now. If signs of aspiration/too lethargic to swallow will need to place NGT for med administration   CT head negative, EEG without seizures      2. Acute on chronic diastolic CHF/ Acute hypoxic respiratory failure  Sec to pulm edema  No longer requiring bipap and now weaned off Hi yazmin O2 and tolerating nasal cannula  Continue to taper supplemental NC o2 as able    3. Acute blood loss Anemia   Hx of recurrent GI bleed? prior EGDs/colonoscopies this year which were negative, prior CTA with +transverse colon bleed  Seen by GI on this admission, no invasive intervention   Responded to blood transfusions, last transfusion 12/28   No signs of active bleeding at this time.    Protonix, octreotide- total 5 days through 12/30/19 (last day today)  Avoid chemical DVT PPE for now     4. Known CASTAÑEDA Cirrhosis with portal gastropathy and AVMs  Lactulose and rifaximin  Octreotide, PPI    5. KEITH/ATN on CKD  Likely sec to blood loss  Was briefly on dialysis in the past  Renal US showed no hydronephrosis  Seen in consult by nephrology, now again on HD per nephrology via left IJ  No HD today as per nephro, will plan for HD Thursday   Discussed w Dr Castro - pt will likely need long term HD, eventual plan for PermCath / AVF will consult vascular team today   Discussed with vascular, vein mapping and tentative plan for permacath thursday, holding off on AVF planning for now    6. Sepsis  Secondary to PNA, b/l infiltrates on CT R>L  (previously treated with zosyn at NH)  Abx broadened to merrem x 5 days, last day 1/1/20  Ucx negative- Blood cx negative   Unstageable sacral decub debrided x 2 by surgical team, planning for OR debridement today   Cont wound care  ID consulted for any further antibiotic recs     7.  DM2  Well controlled  Cont current regimen    8. Hypotension  Avoid BP meds  Midodrine for now     9. Lung Nodule  Incidental finding on CT  Will need outpatient follow up with pulm for repeat imaging in 6 months     ICDs    Patient at baseline is bed bound and almost functional quadriplegia, with a large unstageable sacral decub and low possibility of healing well given bed bound status, does not even sit in chair, morbid obesity. It is prudent to have GoC conversation.  Palliative Care team/hospitalist team meeting set up for today at 1pm? 63 yo female with hx of CHFpEF, CAD, CKD, HTN, DM, cirrhosis, portal gastropathy, recurrent GI bleed, blind, sacral decub ulcer, presented to the ED from nursing home with encephalopathy, hypoxic resp failure requiring bipap, in ED found to have anemia with Hb 5 that responded to blood transfusions (hx of recurrent GI bleed for which multiple EGDs/colonoscopies have been performed, no further invasive work up as per GI), sepsis secondary to PNA on abx, KEITH on CKD now requiring HD via left IJ.     1. S/p Metabolic vs Uremic vs Hepatic Encephalopathy  Likely multifactorial - Hepatic, Uremic. Uremia has resolved. Hepatic- responds well to lactulose  Pt received pain meds yesterday 12/30/19, became very lethargic from the pain meds. Narcs were dced. Pts oral meds were held and pt did not receive her 2 scheduled doses of lactulose due to lethargy.  Pt previously had NGT however she  was seen by S&S and was cleared for a liquid diet and NGT was dced. Will get stat ammonia level and increase lactulose to 20 TID and give stat dose now.  If signs of aspiration/too lethargic to swallow will need to place NGT for med administration   S/ S eval- pureed with no liquids  CT head negative, EEG without seizures      2. Acute on chronic diastolic CHF/ Acute hypoxic respiratory failure  Sec to pulm edema  No longer requiring bipap and now weaned off Hi yazmin O2 and tolerating nasal cannula  Continue to taper supplemental NC O2 as able    3. Acute blood loss Anemia   Hx of recurrent GI bleed? prior EGDs/ colonoscopies this year which were negative, prior CTA with +transverse colon bleed  Seen by GI on this admission, no invasive intervention   Responded to blood transfusions, last transfusion 12/28   No signs of active bleeding at this time.    Protonix, octreotide- total 5 days through 12/30/19 completed  Avoid chemical DVT PPE for now     4. Known CASTAÑEDA Cirrhosis with portal gastropathy and AVMs  Lactulose and rifaximin  Octreotide, PPI    5. KEITH/ATN on CKD  Likely sec to blood loss  Was briefly on dialysis in the past  Renal US showed no hydronephrosis  Seen in consult by nephrology, now again on HD per nephrology via left IJ  No HD today as per nephro, will plan for HD Thursday   Discussed w Dr Ilamathi - pt will likely need long term HD, eventual plan for PermCath / AVF will consult vascular team today   Discussed with vascular, vein mapping and tentative plan for permacath thursday, holding off on AVF planning for now    6. Sepsis  Secondary to PNA, b/l infiltrates on CT R>L  (previously treated with zosyn at NH)  Abx broadened to merrem x 5 days, last day 1/1/20  Ucx negative- Blood cx negative   Unstageable sacral decub debrided x 2 by surgical team, planning for OR debridement   Cont wound care  ID consulted for any further antibiotic recs     7.  DM2  Well controlled  Cont current regimen    8. Hypotension  Avoid BP meds  Midodrine for now     9. Lung Nodule  Incidental finding on CT  Will need outpatient follow up with pulm for repeat imaging in 6 months     ICDs    Patient at baseline is bed bound and almost functional quadriplegia, with a large unstageable sacral decub and low possibility of healing well given bed bound status, does not even sit in chair, morbid obesity. It is prudent to have GoC conversation.     Palliative Care team/hospitalist team meeting: Pt's dgtr/  at bedside. Pt's son and sister on phone. D/W them DNR/ DNI, end of life goals, QoL, ongoing and chronic diagnoses, poor prognosis, Family deferring decision making to the patient who is not able to grasp the gravity of her diagnoses and the poor prognostic reality. She is not making the decisions about end of life care yet, however she states she wants OR debridement. But family insisting not to take her to OR sec to her having CEA during GA in the past and that pt reacts to anesthesia very badly. [place] agreement made to postpone OR debridement for now. Meanwhile they will continue to talk amongst themselves about end of life care.    Time spent for this meeting separate from patient care: 45 mins 63 yo female with hx of CHFpEF, CAD, CKD, HTN, DM, cirrhosis, portal gastropathy, recurrent GI bleed, blind, sacral decub ulcer, presented to the ED from nursing home with encephalopathy, hypoxic resp failure requiring bipap, in ED found to have anemia with Hb 5 that responded to blood transfusions (hx of recurrent GI bleed for which multiple EGDs/colonoscopies have been performed, no further invasive work up as per GI), sepsis secondary to PNA on abx, KEITH on CKD now requiring HD via left IJ.     1. S/p Metabolic vs Uremic vs Hepatic Encephalopathy  Likely multifactorial - Hepatic, Uremic. Uremia has resolved. Hepatic- responds well to lactulose  Pt received pain meds yesterday 12/30/19, became very lethargic from the pain meds. Narcs were dced. Pts oral meds were held and pt did not receive her 2 scheduled doses of lactulose due to lethargy.  Pt previously had NGT however she  was seen by S&S and was cleared for a liquid diet and NGT was dced. Will get stat ammonia level and increase lactulose to 20 TID and give stat dose now.  If signs of aspiration/too lethargic to swallow will need to place NGT for med administration   S/ S eval- pureed with no liquids  CT head negative, EEG without seizures      2. Acute on chronic diastolic CHF/ Acute hypoxic respiratory failure  Sec to pulm edema  No longer requiring bipap and now weaned off Hi yazmin O2 and tolerating nasal cannula  Continue to taper supplemental NC O2 as able    3. Acute blood loss Anemia   Hx of recurrent GI bleed? prior EGDs/ colonoscopies this year which were negative, prior CTA with +transverse colon bleed  Seen by GI on this admission, no invasive intervention   Responded to blood transfusions, last transfusion 12/28   No signs of active bleeding at this time.    Protonix, octreotide- total 5 days through 12/30/19 completed  Avoid chemical DVT PPE for now     4. Known CASTAÑEDA Cirrhosis with portal gastropathy and AVMs  Lactulose and rifaximin  Octreotide, PPI    5. KEITH/ATN on CKD  Likely sec to blood loss  Was briefly on dialysis in the past  Renal US showed no hydronephrosis  Seen in consult by nephrology, now again on HD per nephrology via left IJ  No HD today as per nephro, will plan for HD Thursday   Discussed w Dr Ilamathi - pt will likely need long term HD, eventual plan for PermCath / AVF will consult vascular team today   Discussed with vascular, vein mapping and tentative plan for permacath thursday, holding off on AVF planning for now    6. Sepsis  Secondary to PNA, b/l infiltrates on CT R>L  (previously treated with zosyn at NH)  Abx broadened to merrem x 5 days, last day 1/1/20  Ucx negative- Blood cx negative   Unstageable sacral decub debrided x 2 by surgical team, planning for OR debridement   Cont wound care  ID consulted for any further antibiotic recs     7.  DM2  Well controlled  Cont current regimen    8. Hypotension  Avoid BP meds  Midodrine for now     9. Lung Nodule  Incidental finding on CT  Will need outpatient follow up with pulm for repeat imaging in 6 months     ICDs    Patient at baseline is bed bound and almost functional quadriplegia, with a large unstageable sacral decub and low possibility of healing well given bed bound status, does not even sit in chair, morbid obesity. It is prudent to have GoC conversation.     Palliative Care team/hospitalist team meeting: Pt's dgtr/  at bedside. Pt's son and sister on phone. D/W them DNR/ DNI, end of life goals, QoL, ongoing and chronic diagnoses, poor prognosis, Family deferring decision making to the patient who is not able to grasp the gravity of her diagnoses and the poor prognostic reality. She is not making the decisions about end of life care yet, however she states she wants OR debridement. But family insisting not to take her to OR sec to her having CEA during GA in the past and that pt reacts to anesthesia very badly. [place] agreement made to postpone OR debridement for now. Meanwhile they will continue to talk amongst themselves about end of life care.    Time spent for this meeting separate from patient care: 45 mins

## 2019-12-31 NOTE — SWALLOW BEDSIDE ASSESSMENT ADULT - COMMENTS
As per charting, pt is a "Patient is a 64y old  Female who presents with a chief complaint of Acute hypoxemic respiratory failure, GIB".

## 2019-12-31 NOTE — PROGRESS NOTE ADULT - ASSESSMENT
1) KEITH on CKD now likely ESRD  2) encephalopathy  4) Acute blood loss anemia superimposed on anemia of CKD  5) Hypotension      Hold HD today  Continue Midodrine   Continue QUENTIN with HD  Monitor lytes, BP and UOP  Vascular for AVF creation  Will follow 1) KEITH on CKD now likely ESRD  2) encephalopathy  4) Acute blood loss anemia superimposed on anemia of CKD  5) Hypotension      Hold HD today  Continue Midodrine   Continue QUENTIN with HD  Monitor lytes, BP and UOP  Vascular for AVF creation  Will follow    D/W Rolan, Melisa for a TDC,    I was Physically Present for the key portions of the Evaluation & management ( E/M ) Service provided.    I agree with the above History  , Physical examination & Treatment Plans,    I have Reviewed , Modified or appended where appropriate,

## 2019-12-31 NOTE — CONSULT NOTE ADULT - ASSESSMENT
This is a 64 year old female PMHX dCHF, pHTN, CAD, CKD, HTN, cirrhosis, recurrent GIB, blindness, large sacralwound, admitted to [facility] from NH with altered mental status, hypoxemia, hypotension Patient was transfused with PRBc, in and out of consciousness, and developed progressive respiratory distress requiring NIPPV

## 2019-12-31 NOTE — CHART NOTE - NSCHARTNOTEFT_GEN_A_CORE
Source: Patient [x ]  Family [x ]   other [ ]    Current Diet: Diet, Full Liquid:   For patients receiving Renal Replacement - No Protein Restr, No Conc K, No Conc Phos, Low  Sodium (RENAL)  No Caffeine (12-30-19 @ 07:14)    PO intake: Pt with frequent NPO orders and full liquid diet. Pt consuming 50-75% of full liquids.     Current Weight:   (12/29) 198.6#  (12/28) 214.5#  (12/27) 209.8#  (12/26) 214.9#  -Question accuracy of weights, will continue to monitor. Weight fluctuations could be due to fluid fluctuations, pt receiving HD. Generalized moderate edema noted per EMR.    Pertinent Medications: MEDICATIONS  (STANDING):  albuterol/ipratropium for Nebulization. 3 milliLiter(s) Nebulizer every 6 hours  chlorhexidine 4% Liquid 1 Application(s) Topical <User Schedule>  collagenase Ointment 1 Application(s) Topical daily  dextrose 5%. 1000 milliLiter(s) (50 mL/Hr) IV Continuous <Continuous>  dextrose 50% Injectable 12.5 Gram(s) IV Push once  dextrose 50% Injectable 25 Gram(s) IV Push once  dextrose 50% Injectable 25 Gram(s) IV Push once  epoetin sara Injectable 33623 Unit(s) IV Push <User Schedule>  insulin lispro (HumaLOG) corrective regimen sliding scale   SubCutaneous every 6 hours  lactulose Syrup 20 Gram(s) Oral three times a day  pantoprazole  Injectable 40 milliGRAM(s) IV Push two times a day  rifAXIMin 550 milliGRAM(s) Oral two times a day  vancomycin  IVPB 1000 milliGRAM(s) IV Intermittent once    MEDICATIONS  (PRN):  acetaminophen   Tablet .. 650 milliGRAM(s) Oral every 6 hours PRN Temp greater or equal to 38C (100.4F), Mild Pain (1 - 3)  dextrose 40% Gel 15 Gram(s) Oral once PRN Blood Glucose LESS THAN 70 milliGRAM(s)/deciLiter  glucagon  Injectable 1 milliGRAM(s) IntraMuscular once PRN Glucose <70 milliGRAM(s)/deciLiter  midodrine. 10 milliGRAM(s) Oral three times a day PRN if SBP 90 or less give 1 hour before HD  traMADol 50 milliGRAM(s) Oral every 8 hours PRN Severe Pain (7 - 10)    Pertinent Labs: CBC Full  -  ( 31 Dec 2019 05:13 )  WBC Count : 13.20 K/uL  RBC Count : 2.95 M/uL  Hemoglobin : 8.4 g/dL  Hematocrit : 25.2 %  Platelet Count - Automated : 151 K/uL  Mean Cell Volume : 85.4 fl  Mean Cell Hemoglobin : 28.5 pg  Mean Cell Hemoglobin Concentration : 33.3 gm/dL  Auto Neutrophil # : x  Auto Lymphocyte # : x  Auto Monocyte # : x  Auto Eosinophil # : x  Auto Basophil # : x  Auto Neutrophil % : x  Auto Lymphocyte % : x  Auto Monocyte % : x  Auto Eosinophil % : x  Auto Basophil % : x  12-31 Na136 mmol/L Glu 147 mg/dL<H> K+ 3.8 mmol/L Cr  2.68 mg/dL<H> BUN 26.0 mg/dL<H> Phos n/a   Alb n/a   PAB n/a       Skin: R heel wound, Unstageable sacrum     Nutrition focused physical exam previously conducted - found signs of malnutrition [x ]absent [ ]present    Subcutaneous fat loss: [ ] Orbital fat pads region, [ ]Buccal fat region, [ ]Triceps region,  [ ]Ribs region    Muscle wasting: [ ]Temples region, [ ]Clavicle region, [ ]Shoulder region, [ ]Scapula region, [ ]Interosseous region,  [ ]thigh region, [ ]Calf region    Estimated Needs:   [x ] no change since previous assessment  [ ] recalculated:     Current Nutrition Diagnosis: Pt remains at high nutrition risk and meets criteria for moderate (chronic) malnutrition related to inability to consume increased protein energy intake while NPO in setting of unstageable sacral wound, hypovolemic shock for possible GI bleed/blood loss anemia as evidenced by pt meeting <75% estimated energy intake > 1 month and moderate 3+ edema. Diarrhea noted per EMR 12/29, BM noted 12/30. Pt was lethargic during assessment. RD to remain available.     Recommendations:   1) Continue assistance at meals  2) Advance diet when medically feasible  3) Monitor weights and labs  4) RX: Neprovite and vitamin C (500mg) daily    Monitoring and Evaluation:   [ x] PO intake [x ] Tolerance to diet prescription [X] Weights  [X] Follow up per protocol [X] Labs:

## 2019-12-31 NOTE — CONSULT NOTE ADULT - PROBLEM SELECTOR RECOMMENDATION 4
Met with patient at bedside, able to respond appropriately to name, , locations but not situation.   Spoke with daughter this morning as well as Dr. King, plan for family meeting today at 1 PM to further discuss goals, advance directives, plan of care.    Will update document post family meeting 1 PM Met with patient at bedside, able to respond appropriately to name, , locations but not situation.   - Family meeting held at bedside with patient's  Pito Mehta, myself, Dr. King, Cass NOLEN, and patient's daughter Abdoulaye Alas.  -Discussed in detail diagnosis, prognosis, plan of care, surgical options, comfort options, as well as answered all questions and concerns family had. At the end of our meeting patient established to REMAIN FULL CODE including CPR and Intubation. Also at this time daughter and  opting to hold off on surgery to decubiti area.  -Family feel that they need more time to further decide on treatment plan and if they want to change code status.   - Will continue to support and monitor    Total time spent 50 minutes    COUNSELING:  Face to face meeting to discuss Advanced Care Planning - Time Spent _40_____Minutes.  See goals of care note.      Thank you for the opportunity to assist with the care of this patient.   [facility] [phone]. Met with patient at bedside, able to respond appropriately to name, , locations but not situation.   - Family meeting held at bedside with patient's  Pito Mehta, myself, Dr. King, Cass NOLEN, and patient's daughter Cas Alas.  -Discussed in detail diagnosis, prognosis, plan of care, surgical options, comfort options, as well as answered all questions and concerns family had. At the end of our meeting patient established to REMAIN FULL CODE including CPR and Intubation. Also at this time daughter and  opting to hold off on surgery to decubiti area.  -Family feel that they need more time to further decide on treatment plan and if they want to change code status.   - Will continue to support and monitor    Total time spent 50 minutes    COUNSELING:  Face to face meeting to discuss Advanced Care Planning - Time Spent _40_____Minutes.  See goals of care note.      Thank you for the opportunity to assist with the care of this patient.   [facility] Consult [facility] [phone].

## 2019-12-31 NOTE — CONSULT NOTE ADULT - SUBJECTIVE AND OBJECTIVE BOX
Vascular consult for AVF received    Patient currently being dialyzed via temporary IJ access     HD initiated on current  admission    Full Consult to follow pos t discussion with renal team Vascular consult for AVF received    Patient currently being dialyzed via temporary IJ access     HD initiated on current  admission    Full Consult to follow pos t discussion with renal team         Vascular Attending:  Chris JONES      HPI:  65 y/o F with a h/o dCHF, moderate pHTN, CAD, CKD, HTN, DM, cirrhosis, portal gastropathy, recurrent GIB, blind, large sacral wound, presents to the ED from NH with AMS, hypoxemia, and hypotension. Patient had large melenotic BM in the ED. H&H: 5.8/17. Lactate: 2.7. KEITH on CKD. Hyperkalemic. Volume resuscitation began with 3u PRBC. Patient minimally responsive and moaning in pain. UA(+) with thick tan urine in fontenot. Developed progressive respiratory distress and hypoxemia requiring NIPPV. (26 Dec 2019 03:58)    Vascular Surgery HPI:  Admission Hpi reviewed  Patient with multiple comorbidities including esrd  HD initiated on current admission.  Reports to be Right hand dominant         PAST MEDICAL & SURGICAL HISTORY:  Legally blind in right eye, as defined in [place]  CAD in native artery  Chronic CHF  DM (diabetes mellitus), type 2  GERD (gastroesophageal reflux disease)  Hyperlipidemia  HTN (hypertension)  Congestive heart failure, unspecified HF chronicity, unspecified heart failure type  Insomnia  Retinal detachment  CAD (coronary artery disease)  Glaucoma  Urinary retention  Peripheral neuropathy  GERD (gastroesophageal reflux disease)  Depression  Diabetes  Hypertension, unspecified type  Herniated disc, cervical  PAD (peripheral artery disease)  Legally blind  History of peripheral vascular disease  History of retinal detachment  History of CEA (carotid endarterectomy): Right  Hyperlipidemia  Glaucoma  Hypertension  Diabetes  H/O carotid endarterectomy  S/P CABG (coronary artery bypass graft)  No significant past surgical history  S/P CABG x 1  After cataract  Uveitic glaucoma of both eyes  Detached retina  Atherosclerosis of right carotid artery   delivery delivered      REVIEW OF SYSTEMS: SEE HPI         MEDICATIONS  (STANDING):  albuterol/ipratropium for Nebulization. 3 milliLiter(s) Nebulizer every 6 hours  chlorhexidine 4% Liquid 1 Application(s) Topical <User Schedule>  collagenase Ointment 1 Application(s) Topical daily  dextrose 5%. 1000 milliLiter(s) (50 mL/Hr) IV Continuous <Continuous>  dextrose 50% Injectable 12.5 Gram(s) IV Push once  dextrose 50% Injectable 25 Gram(s) IV Push once  dextrose 50% Injectable 25 Gram(s) IV Push once  epoetin sara Injectable 82884 Unit(s) IV Push <User Schedule>  insulin lispro (HumaLOG) corrective regimen sliding scale   SubCutaneous every 6 hours  lactulose Syrup 20 Gram(s) Oral three times a day  meropenem  IVPB 500 milliGRAM(s) IV Intermittent every 24 hours  pantoprazole  Injectable 40 milliGRAM(s) IV Push two times a day  rifAXIMin 550 milliGRAM(s) Oral two times a day  vancomycin  IVPB 1000 milliGRAM(s) IV Intermittent once    MEDICATIONS  (PRN):  acetaminophen   Tablet .. 650 milliGRAM(s) Oral every 6 hours PRN Temp greater or equal to 38C (100.4F), Mild Pain (1 - 3)  dextrose 40% Gel 15 Gram(s) Oral once PRN Blood Glucose LESS THAN 70 milliGRAM(s)/deciLiter  glucagon  Injectable 1 milliGRAM(s) IntraMuscular once PRN Glucose <70 milliGRAM(s)/deciLiter  midodrine. 10 milliGRAM(s) Oral three times a day PRN if SBP 90 or less give 1 hour before HD  traMADol 50 milliGRAM(s) Oral every 8 hours PRN Severe Pain (7 - 10)      Allergies    ACE inhibitors (Other (Mild))    Intolerances    oxycodone (Sedation/Somnol)      SOCIAL HISTORY:  NON CONTRIBUTORY       Vital Signs Last 24 Hrs  T(C): 37 (31 Dec 2019 08:27), Max: 37.9 (31 Dec 2019 08:05)  T(F): 98.6 (31 Dec 2019 08:27), Max: 100.2 (31 Dec 2019 08:05)  HR: 79 (31 Dec 2019 09:44) (73 - 89)  BP: 104/53 (31 Dec 2019 08:27) (84/64 - 108/49)  BP(mean): 68 (31 Dec 2019 08:27) (57 - 72)  RR: 11 (31 Dec 2019 08:27) (11 - 22)  SpO2: 100% (31 Dec 2019 09:44) (95% - 100%)    PHYSICAL EXAM:      Constitutional:  ill appearing, lethargic, anasarca      Eyes: obvious right eye visual deformity     ENMT: atraumatic     Neck: no noted bruit       Respiratory:  non labored     Cardiovascular:  s1/s2     Gastrointestinal: obese, non tender, no pulsatile mass       Extremities: pitting edema to all extremities, healed surgical sites to left thigh, right foot s/p 2/3/4 toe amps, PICC to the left upper ext     Vascular: equal radials, unable to palpate pedals     Neurological: gross distal and motor function intact     Psychiatric: flat affect       Pulses:   Right:                                                                          Left:  FEM [ x]2+ [ ]1+ [ ]doppler                                             FEM [x ]2+ [ ]1+ [ ]doppler    POP [ x]2+ [ ]1+ [ ]doppler                                             POP [ ]2+ [x ]1+ [ ]doppler    DP [ ]2+ [ ]1+ [ ]doppler                                                DP [ ]2+ [ ]1+ [ ]doppler  PT[ ]2+ [ ]1+ [ ]doppler                                                  PT [ ]2+ [ ]1+ [ ]doppler      LABS:                        8.4    13.20 )-----------( 151      ( 31 Dec 2019 05:13 )             25.2         136  |  100  |  26.0<H>  ----------------------------<  147<H>  3.8   |  27.0  |  2.68<H>    Ca    7.7<L>      31 Dec 2019 05:13      PT/INR - ( 31 Dec 2019 05:13 )   PT: 15.9 sec;   INR: 1.37 ratio         PTT - ( 31 Dec 2019 05:13 )  PTT:31.1 sec      RADIOLOGY & ADDITIONAL STUDIES    Impression and Plan:    ESRD with HD initiated on this current admission   Multiple comorbidities  low grade fever of unknown source, on IV antibiotic therapy  Large reported sacral ulceration requiring debridement   anasarca    - will need shiley changed to permacath once cleared from ID perspective    - will attempt to arrange for Thursday  - vein mapping ordered  - please remove left PICC when no longer indicated for use   - will hold off on AVF planning for now

## 2019-12-31 NOTE — SWALLOW BEDSIDE ASSESSMENT ADULT - ASR SWALLOW ASPIRATION MONITOR
gurgly voice/upper respiratory infection/change of breathing pattern/position upright (90Y)/throat clearing/cough/oral hygiene/pneumonia

## 2019-12-31 NOTE — PROGRESS NOTE ADULT - SUBJECTIVE AND OBJECTIVE BOX
SUBJECTIVE:  Patient unable to provide subjective, nonverbal. Examined wound at bedside.      MEDICATIONS  (STANDING):  albuterol/ipratropium for Nebulization. 3 milliLiter(s) Nebulizer every 6 hours  chlorhexidine 4% Liquid 1 Application(s) Topical <User Schedule>  collagenase Ointment 1 Application(s) Topical daily  dextrose 5%. 1000 milliLiter(s) (50 mL/Hr) IV Continuous <Continuous>  dextrose 50% Injectable 12.5 Gram(s) IV Push once  dextrose 50% Injectable 25 Gram(s) IV Push once  dextrose 50% Injectable 25 Gram(s) IV Push once  epoetin sara Injectable 81095 Unit(s) IV Push <User Schedule>  insulin lispro (HumaLOG) corrective regimen sliding scale   SubCutaneous every 6 hours  lactulose Syrup 10 Gram(s) Oral two times a day  meropenem  IVPB 500 milliGRAM(s) IV Intermittent every 24 hours  pantoprazole  Injectable 40 milliGRAM(s) IV Push two times a day  rifAXIMin 550 milliGRAM(s) Oral two times a day    MEDICATIONS  (PRN):  acetaminophen   Tablet .. 650 milliGRAM(s) Oral every 6 hours PRN Temp greater or equal to 38C (100.4F), Mild Pain (1 - 3)  dextrose 40% Gel 15 Gram(s) Oral once PRN Blood Glucose LESS THAN 70 milliGRAM(s)/deciLiter  glucagon  Injectable 1 milliGRAM(s) IntraMuscular once PRN Glucose <70 milliGRAM(s)/deciLiter  midodrine. 10 milliGRAM(s) Oral three times a day PRN if SBP 90 or less give 1 hour before HD  traMADol 50 milliGRAM(s) Oral every 8 hours PRN Severe Pain (7 - 10)      Vital Signs Last 24 Hrs  T(C): 37.3 (31 Dec 2019 00:00), Max: 37.3 (30 Dec 2019 12:00)  T(F): 99.2 (31 Dec 2019 00:00), Max: 99.2 (31 Dec 2019 00:00)  HR: 88 (31 Dec 2019 00:28) (73 - 88)  BP: 89/46 (31 Dec 2019 00:00) (86/53 - 108/49)  BP(mean): 57 (31 Dec 2019 00:00) (57 - 68)  RR: 17 (31 Dec 2019 00:00) (13 - 22)  SpO2: 96% (31 Dec 2019 00:28) (96% - 100%)    PE  gen: nad  heent: EOMI grossly   cv: rrr  resp: non-invasive positive pressure ventilation  gi: soft, nd, nttp  back: 6x6 sacral ulcer with necrotic skin borders w/p fibrinous exudative tissue overlying sacrum.       I&O's Detail    29 Dec 2019 07:01  -  30 Dec 2019 07:00  --------------------------------------------------------  IN:    octreotide  Infusion: 40 mL    Oral Fluid: 600 mL    Solution: 50 mL  Total IN: 690 mL    OUT:    Other: 1500 mL  Total OUT: 1500 mL    Total NET: -810 mL      30 Dec 2019 07:01  -  31 Dec 2019 02:05  --------------------------------------------------------  IN:    Oral Fluid: 840 mL    Solution: 50 mL  Total IN: 890 mL    OUT:  Total OUT: 0 mL    Total NET: 890 mL          LABS:                        7.9    12.79 )-----------( 157      ( 30 Dec 2019 13:52 )             23.7     12-30    138  |  100  |  24.0<H>  ----------------------------<  250<H>  3.4<L>   |  27.0  |  2.41<H>    Ca    7.4<L>      30 Dec 2019 13:52  Phos  4.4     12-29  Mg     1.9     12-29            RADIOLOGY & ADDITIONAL STUDIES:

## 2019-12-31 NOTE — PROGRESS NOTE ADULT - SUBJECTIVE AND OBJECTIVE BOX
Patient is a 64y old  Female who presents with a chief complaint of Acute hypoxemic respiratory failure, GIB     Subjective/objective:  Pt seen and examined at bedside  No overnight events reported  This morning pt is lethargic, barely opens eyes, rseponds to sternal rubs. Minimal interaction. It seems pt did not receive her last 2 scheduled lactulose doses for unknown reasons  Sister at bedside  Unable to obtain ROS   VSS    Vital Signs Last 24 Hrs  T(C): 37 (31 Dec 2019 08:27), Max: 37.9 (31 Dec 2019 08:05)  T(F): 98.6 (31 Dec 2019 08:27), Max: 100.2 (31 Dec 2019 08:05)  HR: 79 (31 Dec 2019 09:44) (73 - 89)  BP: 104/53 (31 Dec 2019 08:27) (84/64 - 108/49)  BP(mean): 68 (31 Dec 2019 08:27) (57 - 72)  RR: 11 (31 Dec 2019 08:27) (11 - 22)  SpO2: 100% (31 Dec 2019 09:44) (95% - 100%)     PHYSICAL EXAM  GENERAL: Lethargic, responds to sternal rubs, not very verbal this morning   HEAD:  Atraumatic, Normocephalic  EYES: , conjunctiva and sclera clear, right sided blind/white eye  ENT: Moist mucous membranes,  No lesions  NECK: Supple, No JVD, Normal thyroid  NERVOUS SYSTEM:  lethargic, able to tell me her name, not answering other questions this am  CHEST/LUNG: Diminished but CTA bilaterally; No rales, rhonchi, wheezing, or rubs  HEART: Regular rate and rhythm; No murmurs, rubs, or gallops  ABDOMEN: Soft, Nontender, Nondistended; Bowel sounds present, Large Sacral Decub ulcer unstageable s/p debridement yesterday  EXTREMITIES:  2+ Peripheral Pulses, No clubbing, cyanosis, or edema      LABS:                                   8.4    13.20 )-----------( 151      ( 31 Dec 2019 05:13 )             25.2     12-31    136  |  100  |  26.0<H>  ----------------------------<  147<H>  3.8   |  27.0  |  2.68<H>    Ca    7.7<L>      31 Dec 2019 05:13 Patient is a 64y old  Female who presents with a chief complaint of Acute hypoxemic respiratory failure, GIB     Subjective/objective:  Pt seen and examined at bedside  No overnight events reported  This morning pt is lethargic, barely opens eyes, rseponds to sternal rubs. Minimal interaction. It seems pt did not receive her last 2 scheduled lactulose doses for unknown reasons  Sister at bedside  Unable to obtain ROS   VSS    Vital Signs Last 24 Hrs  T(C): 37 (31 Dec 2019 08:27), Max: 37.9 (31 Dec 2019 08:05)  T(F): 98.6 (31 Dec 2019 08:27), Max: 100.2 (31 Dec 2019 08:05)  HR: 79 (31 Dec 2019 09:44) (73 - 89)  BP: 104/53 (31 Dec 2019 08:27) (84/64 - 108/49)  BP(mean): 68 (31 Dec 2019 08:27) (57 - 72)  RR: 11 (31 Dec 2019 08:27) (11 - 22)  SpO2: 100% (31 Dec 2019 09:44) (95% - 100%) on 2-3 L NC     PHYSICAL EXAM  GENERAL: Lethargic, responds to sternal rubs, not very verbal this morning   HEAD:  Atraumatic, Normocephalic  EYES: , conjunctiva and sclera clear, right sided blind/white eye  ENT: Moist mucous membranes,  No lesions  NECK: Supple, No JVD, Normal thyroid  NERVOUS SYSTEM:  lethargic, able to tell me her name, not answering other questions this am  CHEST/LUNG: Diminished but CTA bilaterally; No rales, rhonchi, wheezing, or rubs  HEART: Regular rate and rhythm; No murmurs, rubs, or gallops  ABDOMEN: Soft, Nontender, Nondistended; Bowel sounds present, Large Sacral Decub ulcer unstageable s/p debridement yesterday  EXTREMITIES:  2+ Peripheral Pulses, No clubbing, cyanosis, or edema      LABS:                                   8.4    13.20 )-----------( 151      ( 31 Dec 2019 05:13 )             25.2     12-31    136  |  100  |  26.0<H>  ----------------------------<  147<H>  3.8   |  27.0  |  2.68<H>    Ca    7.7<L>      31 Dec 2019 05:13 Patient is a 64y old  Female who presents with a chief complaint of Acute hypoxemic respiratory failure, GIB     Subjective/objective:  Pt seen and examined at bedside  No overnight events reported  This morning pt is lethargic, barely opens eyes, rseponds to sternal rubs. Minimal interaction. It seems pt did not receive her last 2 scheduled lactulose doses as pt was completely sedated with 1 dose of oxycontin given for severe pain during the day yesterday  Sister at bedside  later on she started to wake up and converse and answer coherently  at which point her AM meds were given by mouth.    ROS:  Unable to obtain     Vital Signs Last 24 Hrs  T(C): 37 (31 Dec 2019 08:27), Max: 37.9 (31 Dec 2019 08:05)  T(F): 98.6 (31 Dec 2019 08:27), Max: 100.2 (31 Dec 2019 08:05)  HR: 79 (31 Dec 2019 09:44) (73 - 89)  BP: 104/53 (31 Dec 2019 08:27) (84/64 - 108/49)  BP(mean): 68 (31 Dec 2019 08:27) (57 - 72)  RR: 11 (31 Dec 2019 08:27) (11 - 22)  SpO2: 100% (31 Dec 2019 09:44) (95% - 100%) on 2-3 L NC     PHYSICAL EXAM  GENERAL: somnolent but comes awake and answers all questions  HEAD:  Atraumatic, Normocephalic  EYES: , conjunctiva and sclera clear, right sided blind/white eye  ENT: Moist mucous membranes,  No lesions  NECK: Supple, No JVD, Normal thyroid  NERVOUS SYSTEM:  lethargic, able to tell me her name, not answering other questions this am  CHEST/LUNG: Diminished but CTA bilaterally; No rales, rhonchi, wheezing, or rubs  HEART: Regular rate and rhythm; No murmurs, rubs, or gallops  ABDOMEN: Soft, Nontender, Nondistended; Bowel sounds present, Large Sacral Decub ulcer unstageable s/p debridement  x 2  EXTREMITIES:  2+ Peripheral Pulses, No clubbing, cyanosis, or edema      LABS:                                   8.4    13.20 )-----------( 151      ( 31 Dec 2019 05:13 )             25.2     12-31    136  |  100  |  26.0<H>  ----------------------------<  147<H>  3.8   |  27.0  |  2.68<H>    Ca    7.7<L>      31 Dec 2019 05:13

## 2019-12-31 NOTE — SWALLOW BEDSIDE ASSESSMENT ADULT - SWALLOW EVAL: RECOMMENDED FEEDING/EATING TECHNIQUES
allow for swallow between intakes/check mouth frequently for oral residue/pocketing/crush medication (when feasible)/small sips/bites/maintain upright posture during/after eating for 30 mins/oral hygiene/position upright (90 degrees)

## 2019-12-31 NOTE — PROGRESS NOTE ADULT - SUBJECTIVE AND OBJECTIVE BOX
[facility]  INFECTIOUS DISEASES AND INTERNAL MEDICINE at [place]  =======================================================  Matt Jackson MD  Diplomates American Board of Internal Medicine and Infectious Diseases  Telephone [phone]  Fax            [phone]  =======================================================    [id number]  KUN LAVINIARESURPRIS   follow up: for low grade temps, Abx for PNA, sacral decubitus    low grade temps again noted.   dialysis deferred due to low blood pressures.     more awake today.   =======================================================     REVIEW OF SYSTEMS:  Limited due to medical condition    =======================================================  Allergies  ACE inhibitors (Other (Mild))  oxycodone (Sedation/Somnol)    Antibiotics:  meropenem  IVPB 500 milliGRAM(s) IV Intermittent every 24 hours  rifAXIMin 550 milliGRAM(s) Oral two times a day    Other medications:  albuterol/ipratropium for Nebulization. 3 milliLiter(s) Nebulizer every 6 hours  chlorhexidine 4% Liquid 1 Application(s) Topical <User Schedule>  collagenase Ointment 1 Application(s) Topical daily  dextrose 5%. 1000 milliLiter(s) IV Continuous <Continuous>  dextrose 50% Injectable 12.5 Gram(s) IV Push once  dextrose 50% Injectable 25 Gram(s) IV Push once  dextrose 50% Injectable 25 Gram(s) IV Push once  epoetin sara Injectable 58565 Unit(s) IV Push <User Schedule>  insulin lispro (HumaLOG) corrective regimen sliding scale   SubCutaneous every 6 hours  lactulose Syrup 20 Gram(s) Oral three times a day  pantoprazole  Injectable 40 milliGRAM(s) IV Push two times a day    ======================================================  Physical Exam:  ============  T(F): 98.6 (31 Dec 2019 08:27), Max: 100.2 (31 Dec 2019 08:05)  HR: 79 (31 Dec 2019 09:44)  BP: 104/53 (31 Dec 2019 08:27)  RR: 11 (31 Dec 2019 08:27)  SpO2: 100% (31 Dec 2019 09:44) (95% - 100%)  temp max in last 48H T(F): , Max: 100.2 (12-31-19 @ 08:05)    General:  No acute distress.  OBESE  Eye: Pupils are equal, round and reactive to light, Extraocular movements are intact, Normal conjunctiva.  HENT: Normocephalic, Oral mucosa is DRY  Neck: Supple, No lymphadenopathy.  + RIGHT neck HD catheter  Respiratory: Lungs with coarse breath sound anteriorly  Cardiovascular: Normal rate, Regular rhythm,   + ANASARCA  Gastrointestinal: Soft, Non-tender, Non-distended, Normal bowel sounds.  Genitourinary: No costovertebral angle tenderness.  Lymphatics: No lymphadenopathy neck,   Musculoskeletal: no joint abnl  Integumentary: No rash.  Neurologic: sedated, not fully assessed  Psychiatric: not evaluated.    =======================================================  Labs:                        8.4    13.20 )-----------( 151      ( 31 Dec 2019 05:13 )             25.2       WBC Count: 13.20 K/uL (12-31-19 @ 05:13)  WBC Count: 12.79 K/uL (12-30-19 @ 13:52)  WBC Count: 14.27 K/uL (12-29-19 @ 06:42)  WBC Count: 14.93 K/uL (12-28-19 @ 14:53)  WBC Count: 14.55 K/uL (12-28-19 @ 05:01)  WBC Count: 14.85 K/uL (12-27-19 @ 18:05)  WBC Count: 14.23 K/uL (12-27-19 @ 06:09)  WBC Count: 12.82 K/uL (12-26-19 @ 18:42)      12-31    136  |  100  |  26.0<H>  ----------------------------<  147<H>  3.8   |  27.0  |  2.68<H>    Ca    7.7<L>      31 Dec 2019 05:13        Culture - Urine (collected 12-26-19 @ 02:26)  Source: .Urine  Final Report (12-27-19 @ 12:00):    Culture grew 3 or more types of organisms which indicate    collection contamination; consider recollection only if clinically    indicated.    ,    TYPE: (C=Critical, N=Notification, A=Abnormal) n    TESTS:  _ urine contamination    DATE/TIME CALLED: _ 12/27/2019 11:59:55    CALLED TO: Anais meza rn    READ BACK (2 Patient Identifiers)(Y/N): _ y    READ BACK VALUES (Y/N): _ y    CALLED BY: _ nadira sanchez    Culture - Blood (collected 12-26-19 @ 00:45)  Source: .Blood  Final Report (12-31-19 @ 01:00):    No growth at 5 days.    Culture - Blood (collected 12-26-19 @ 00:45)  Source: .Blood  Final Report (12-31-19 @ 01:00):    No growth at 5 days.

## 2019-12-31 NOTE — PROGRESS NOTE ADULT - PROBLEM SELECTOR PLAN 1
pt with recurrent GI bleeds- No clear source found. Maybe small bowel ( several egd and colonoscopy done ).   hemoglobin stable  - pt to go to OR for debridement of sacral decubitus

## 2019-12-31 NOTE — PROGRESS NOTE ADULT - SUBJECTIVE AND OBJECTIVE BOX
[facility] DIVISION OF KIDNEY DISEASES AND HYPERTENSION -- FOLLOW UP NOTE  --------------------------------------------------------------------------------  Chief Complaint: ESRD/Ongoing hemodialysis requirement    24 hour events/subjective:  Ptient hypotensive today, hold HD      PAST HISTORY  --------------------------------------------------------------------------------  No significant changes to PMH, PSH, FHx, SHx, unless otherwise noted    ALLERGIES & MEDICATIONS  --------------------------------------------------------------------------------  Allergies  ACE inhibitors (Other (Mild))    Intolerances  oxycodone (Sedation/Somnol)    Standing Inpatient Medications  albuterol/ipratropium for Nebulization. 3 milliLiter(s) Nebulizer every 6 hours  chlorhexidine 4% Liquid 1 Application(s) Topical <User Schedule>  collagenase Ointment 1 Application(s) Topical daily  dextrose 5%. 1000 milliLiter(s) IV Continuous <Continuous>  dextrose 50% Injectable 12.5 Gram(s) IV Push once  dextrose 50% Injectable 25 Gram(s) IV Push once  dextrose 50% Injectable 25 Gram(s) IV Push once  epoetin sara Injectable 47667 Unit(s) IV Push <User Schedule>  insulin lispro (HumaLOG) corrective regimen sliding scale   SubCutaneous every 6 hours  lactulose Syrup 10 Gram(s) Oral two times a day  meropenem  IVPB 500 milliGRAM(s) IV Intermittent every 24 hours  pantoprazole  Injectable 40 milliGRAM(s) IV Push two times a day  rifAXIMin 550 milliGRAM(s) Oral two times a day    PRN Inpatient Medications  acetaminophen   Tablet .. 650 milliGRAM(s) Oral every 6 hours PRN  dextrose 40% Gel 15 Gram(s) Oral once PRN  glucagon  Injectable 1 milliGRAM(s) IntraMuscular once PRN  midodrine. 10 milliGRAM(s) Oral three times a day PRN  traMADol 50 milliGRAM(s) Oral every 8 hours PRN      REVIEW OF SYSTEMS  --------------------------------------------------------------------------------  Gen: No weight changes, fatigue, fevers/chills, weakness  Skin: No rashes  Head/Eyes/Ears/Mouth: No headache; Normal hearing; Normal vision w/o blurriness  Respiratory: No dyspnea, cough, wheezing, hemoptysis  CV: No chest pain, PND, orthopnea  GI: No abdominal pain, diarrhea, constipation, nausea, vomiting, melena, hematochezia  : No increased frequency, dysuria, hematuria, nocturia  MSK: No joint pain/swelling; no back pain; no edema  Neuro: No dizziness/lightheadedness, weakness, seizures, numbness, tingling  Heme: No easy bruising or bleeding  Endo: No heat/cold intolerance  Psych: No significant nervousness, anxiety, stress, depression    All other systems were reviewed and are negative, except as noted.    VITALS/PHYSICAL EXAM  --------------------------------------------------------------------------------  T(C): 37.9 (12-31-19 @ 08:05), Max: 37.9 (12-31-19 @ 08:05)  HR: 80 (12-31-19 @ 04:00) (73 - 89)  BP: 99/53 (12-31-19 @ 06:47) (84/64 - 108/49)  RR: 19 (12-31-19 @ 04:00) (13 - 22)  SpO2: 96% (12-31-19 @ 04:00) (95% - 100%)  Wt(kg): --      12-30-19 @ 07:01  -  12-31-19 @ 07:00  --------------------------------------------------------  IN: 890 mL / OUT: 0 mL / NET: 890 mL      Physical Exam:  	Gen: NAD, well-appearing  	HEENT: Supple neck  	Pulm: CTA B/L  	CV: RRR, S1S2; no rub  	Back: No spinal or CVA tenderness  	Abd: +BS, soft, nontender/nondistended  	: No suprapubic tenderness  	UE: Warm, no clubbing, no edema; no asterixis  	LE: Warm, no clubbing, no edema  	Neuro: No focal deficits  	Psych: Normal affect and mood  	Skin: Warm, without rashes  	Vascular access: RIJ non-tunneled HD catheter    LABS/STUDIES  --------------------------------------------------------------------------------              8.4    13.20 >-----------<  151      [12-31-19 @ 05:13]              25.2     136  |  100  |  26.0  ----------------------------<  147      [12-31-19 @ 05:13]  3.8   |  27.0  |  2.68        Ca     7.7     [12-31-19 @ 05:13]      PT/INR: PT 15.9 , INR 1.37       [12-31-19 @ 05:13]  PTT: 31.1       [12-31-19 @ 05:13]      Creatinine Trend:  SCr 2.68 [12-31 @ 05:13]  SCr 2.41 [12-30 @ 13:52]  SCr 2.65 [12-29 @ 06:42]  SCr 3.48 [12-28 @ 05:01]  SCr 4.20 [12-27 @ 06:09]    Urinalysis - [12-26-19 @ 02:27]      Color Red / Appearance Cloudy / SG 1.020 / pH 6.0      Gluc Negative / Ketone Trace  / Bili Negative / Urobili Negative       Blood Large / Protein 500 / Leuk Est Moderate / Nitrite Negative      RBC >50 / WBC 6-10 / Hyaline  / Gran  / Sq Epi  / Non Sq Epi Occasional / Bacteria Few      Iron 335, TIBC 419, %sat 80      [08-06-19 @ 14:55]  Ferritin 102      [08-06-19 @ 14:55]  PTH -- (Ca 9.1)      [05-30-19 @ 18:09]   107  PTH -- (Ca 7.9)      [01-17-19 @ 16:26]   175  PTH -- (Ca 8.1)      [01-16-19 @ 16:55]   148  Vitamin D (25OH) 31.1      [05-30-19 @ 18:17]  HbA1c 5.8      [12-26-19 @ 09:04]  TSH 1.10      [05-17-19 @ 12:21]    HBsAb 18.0      [12-26-19 @ 22:01]  HBsAg Nonreact      [12-26-19 @ 22:01]  HBcAb Nonreact      [12-26-19 @ 22:01]  HCV 0.20, Nonreact      [12-26-19 @ 22:01] [facility] DIVISION OF KIDNEY DISEASES AND HYPERTENSION -- FOLLOW UP NOTE  --------------------------------------------------------------------------------  Chief Complaint: ESRD/Ongoing hemodialysis requirement    24 hour events/subjective:  Ptient hypotensive today, hold HD      PAST HISTORY  --------------------------------------------------------------------------------  No significant changes to PMH, PSH, FHx, SHx, unless otherwise noted    ALLERGIES & MEDICATIONS  --------------------------------------------------------------------------------  Allergies  ACE inhibitors (Other (Mild))    Intolerances  oxycodone (Sedation/Somnol)    Standing Inpatient Medications  albuterol/ipratropium for Nebulization. 3 milliLiter(s) Nebulizer every 6 hours  chlorhexidine 4% Liquid 1 Application(s) Topical <User Schedule>  collagenase Ointment 1 Application(s) Topical daily  dextrose 5%. 1000 milliLiter(s) IV Continuous <Continuous>  dextrose 50% Injectable 12.5 Gram(s) IV Push once  dextrose 50% Injectable 25 Gram(s) IV Push once  dextrose 50% Injectable 25 Gram(s) IV Push once  epoetin sara Injectable 66309 Unit(s) IV Push <User Schedule>  insulin lispro (HumaLOG) corrective regimen sliding scale   SubCutaneous every 6 hours  lactulose Syrup 10 Gram(s) Oral two times a day  meropenem  IVPB 500 milliGRAM(s) IV Intermittent every 24 hours  pantoprazole  Injectable 40 milliGRAM(s) IV Push two times a day  rifAXIMin 550 milliGRAM(s) Oral two times a day    PRN Inpatient Medications  acetaminophen   Tablet .. 650 milliGRAM(s) Oral every 6 hours PRN  dextrose 40% Gel 15 Gram(s) Oral once PRN  glucagon  Injectable 1 milliGRAM(s) IntraMuscular once PRN  midodrine. 10 milliGRAM(s) Oral three times a day PRN  traMADol 50 milliGRAM(s) Oral every 8 hours PRN      REVIEW OF SYSTEMS  --------------------------------------------------------------------------------  Gen: No weight changes, fatigue, fevers/chills, weakness  Skin: No rashes  Head/Eyes/Ears/Mouth: Blind. No headache; Normal hearing; Normal vision w/o blurriness  Respiratory: No dyspnea, cough, wheezing, hemoptysis  CV: No chest pain, PND, orthopnea  GI: No abdominal pain, diarrhea, constipation, nausea, vomiting, melena, hematochezia  : No increased frequency, dysuria, hematuria, nocturia  MSK: No joint pain/swelling; no back pain; no edema  Neuro: No dizziness/lightheadedness, weakness, seizures, numbness, tingling  Heme: No easy bruising or bleeding  Endo: No heat/cold intolerance  Psych: No significant nervousness, anxiety, stress, depression    All other systems were reviewed and are negative, except as noted.    VITALS/PHYSICAL EXAM  --------------------------------------------------------------------------------  T(C): 37.9 (12-31-19 @ 08:05), Max: 37.9 (12-31-19 @ 08:05)  HR: 80 (12-31-19 @ 04:00) (73 - 89)  BP: 99/53 (12-31-19 @ 06:47) (84/64 - 108/49)  RR: 19 (12-31-19 @ 04:00) (13 - 22)  SpO2: 96% (12-31-19 @ 04:00) (95% - 100%)  Wt(kg): --      12-30-19 @ 07:01  -  12-31-19 @ 07:00  --------------------------------------------------------  IN: 890 mL / OUT: 0 mL / NET: 890 mL      Physical Exam:  	Gen: NAD, well-appearing  	HEENT: Supple neck  	Pulm: Coarse breath sounds bilaterally  	CV: RRR, S1S2; no rub  	Back: No spinal or CVA tenderness  	Abd: +BS, soft, nontender/nondistended  	: No suprapubic tenderness  	UE: Warm, no clubbing, no edema; no asterixis  	LE: Warm, no clubbing, no edema  	Neuro: No focal deficits  	Psych: Normal affect and mood  	Skin: Warm, without rashes  	Vascular access: RIJ non-tunneled HD catheter    LABS/STUDIES  --------------------------------------------------------------------------------              8.4    13.20 >-----------<  151      [12-31-19 @ 05:13]              25.2     136  |  100  |  26.0  ----------------------------<  147      [12-31-19 @ 05:13]  3.8   |  27.0  |  2.68        Ca     7.7     [12-31-19 @ 05:13]      PT/INR: PT 15.9 , INR 1.37       [12-31-19 @ 05:13]  PTT: 31.1       [12-31-19 @ 05:13]      Creatinine Trend:  SCr 2.68 [12-31 @ 05:13]  SCr 2.41 [12-30 @ 13:52]  SCr 2.65 [12-29 @ 06:42]  SCr 3.48 [12-28 @ 05:01]  SCr 4.20 [12-27 @ 06:09]    Urinalysis - [12-26-19 @ 02:27]      Color Red / Appearance Cloudy / SG 1.020 / pH 6.0      Gluc Negative / Ketone Trace  / Bili Negative / Urobili Negative       Blood Large / Protein 500 / Leuk Est Moderate / Nitrite Negative      RBC >50 / WBC 6-10 / Hyaline  / Gran  / Sq Epi  / Non Sq Epi Occasional / Bacteria Few      Iron 335, TIBC 419, %sat 80      [08-06-19 @ 14:55]  Ferritin 102      [08-06-19 @ 14:55]  PTH -- (Ca 9.1)      [05-30-19 @ 18:09]   107  PTH -- (Ca 7.9)      [01-17-19 @ 16:26]   175  PTH -- (Ca 8.1)      [01-16-19 @ 16:55]   148  Vitamin D (25OH) 31.1      [05-30-19 @ 18:17]  HbA1c 5.8      [12-26-19 @ 09:04]  TSH 1.10      [05-17-19 @ 12:21]    HBsAb 18.0      [12-26-19 @ 22:01]  HBsAg Nonreact      [12-26-19 @ 22:01]  HBcAb Nonreact      [12-26-19 @ 22:01]  HCV 0.20, Nonreact      [12-26-19 @ 22:01] [facility] DIVISION OF KIDNEY DISEASES AND HYPERTENSION -- FOLLOW UP NOTE  --------------------------------------------------------------------------------  Chief Complaint: ESRD/Ongoing hemodialysis requirement    24 hour events/subjective:  Patient hypotensive today, hold HD    PAST HISTORY  --------------------------------------------------------------------------------  No significant changes to PMH, PSH, FHx, SHx, unless otherwise noted    ALLERGIES & MEDICATIONS  --------------------------------------------------------------------------------  Allergies  ACE inhibitors (Other (Mild))    Intolerances  oxycodone (Sedation/Somnol)    Standing Inpatient Medications  albuterol/ipratropium for Nebulization. 3 milliLiter(s) Nebulizer every 6 hours  chlorhexidine 4% Liquid 1 Application(s) Topical <User Schedule>  collagenase Ointment 1 Application(s) Topical daily  dextrose 5%. 1000 milliLiter(s) IV Continuous <Continuous>  dextrose 50% Injectable 12.5 Gram(s) IV Push once  dextrose 50% Injectable 25 Gram(s) IV Push once  dextrose 50% Injectable 25 Gram(s) IV Push once  epoetin sara Injectable 15006 Unit(s) IV Push <User Schedule>  insulin lispro (HumaLOG) corrective regimen sliding scale   SubCutaneous every 6 hours  lactulose Syrup 10 Gram(s) Oral two times a day  meropenem  IVPB 500 milliGRAM(s) IV Intermittent every 24 hours  pantoprazole  Injectable 40 milliGRAM(s) IV Push two times a day  rifAXIMin 550 milliGRAM(s) Oral two times a day    PRN Inpatient Medications  acetaminophen   Tablet .. 650 milliGRAM(s) Oral every 6 hours PRN  dextrose 40% Gel 15 Gram(s) Oral once PRN  glucagon  Injectable 1 milliGRAM(s) IntraMuscular once PRN  midodrine. 10 milliGRAM(s) Oral three times a day PRN  traMADol 50 milliGRAM(s) Oral every 8 hours PRN    REVIEW OF SYSTEMS  --------------------------------------------------------------------------------  Gen: No weight changes, fatigue, fevers/chills, weakness  Skin: No rashes  Head/Eyes/Ears/Mouth: Blind. No headache; Normal hearing;  Respiratory: No dyspnea, cough, wheezing, hemoptysis  CV: No chest pain, PND, orthopnea  GI: No abdominal pain, diarrhea, constipation, nausea, vomiting, melena, hematochezia  : No increased frequency, dysuria, hematuria, nocturia  MSK: No joint pain/swelling; no back pain; no edema  Neuro: No dizziness/lightheadedness, weakness, seizures, numbness, tingling  Heme: No easy bruising or bleeding  Endo: No heat/cold intolerance  Psych: No significant nervousness, anxiety, stress, depression    All other systems were reviewed and are negative, except as noted.    VITALS/PHYSICAL EXAM  --------------------------------------------------------------------------------  T(C): 37.9 (12-31-19 @ 08:05), Max: 37.9 (12-31-19 @ 08:05)  HR: 80 (12-31-19 @ 04:00) (73 - 89)  BP: 99/53 (12-31-19 @ 06:47) (84/64 - 108/49)  RR: 19 (12-31-19 @ 04:00) (13 - 22)  SpO2: 96% (12-31-19 @ 04:00) (95% - 100%)    12-30-19 @ 07:01  -  12-31-19 @ 07:00  --------------------------------------------------------  IN: 890 mL / OUT: 0 mL / NET: 890 mL    Physical Exam:  	Gen: NAD, well-appearing  	HEENT: Supple neck  	Pulm: Coarse breath sounds bilaterally  	CV: RRR, S1S2; no rub  	Back: No spinal or CVA tenderness  	Abd: +BS, soft, nontender/nondistended  	: No suprapubic tenderness  	UE: Warm, no clubbing, no edema; no asterixis  	LE: Warm, no clubbing, no edema  	Neuro: No focal deficits  	Psych: Normal affect and mood  	Skin: Warm, without rashes  	Vascular access: RIJ non-tunneled HD catheter    LABS/STUDIES  --------------------------------------------------------------------------------              8.4    13.20 >-----------<  151      [12-31-19 @ 05:13]              25.2     136  |  100  |  26.0  ----------------------------<  147      [12-31-19 @ 05:13]  3.8   |  27.0  |  2.68        Ca     7.7     [12-31-19 @ 05:13]      PT/INR: PT 15.9 , INR 1.37       [12-31-19 @ 05:13]  PTT: 31.1       [12-31-19 @ 05:13]      Creatinine Trend:  SCr 2.68 [12-31 @ 05:13]  SCr 2.41 [12-30 @ 13:52]  SCr 2.65 [12-29 @ 06:42]  SCr 3.48 [12-28 @ 05:01]  SCr 4.20 [12-27 @ 06:09]    Urinalysis - [12-26-19 @ 02:27]      Color Red / Appearance Cloudy / SG 1.020 / pH 6.0      Gluc Negative / Ketone Trace  / Bili Negative / Urobili Negative       Blood Large / Protein 500 / Leuk Est Moderate / Nitrite Negative      RBC >50 / WBC 6-10 / Hyaline  / Gran  / Sq Epi  / Non Sq Epi Occasional / Bacteria Few      Iron 335, TIBC 419, %sat 80      [08-06-19 @ 14:55]  Ferritin 102      [08-06-19 @ 14:55]  PTH -- (Ca 9.1)      [05-30-19 @ 18:09]   107  PTH -- (Ca 7.9)      [01-17-19 @ 16:26]   175  PTH -- (Ca 8.1)      [01-16-19 @ 16:55]   148  Vitamin D (25OH) 31.1      [05-30-19 @ 18:17]  HbA1c 5.8      [12-26-19 @ 09:04]  TSH 1.10      [05-17-19 @ 12:21]    HBsAb 18.0      [12-26-19 @ 22:01]  HBsAg Nonreact      [12-26-19 @ 22:01]  HBcAb Nonreact      [12-26-19 @ 22:01]  HCV 0.20, Nonreact      [12-26-19 @ 22:01]

## 2019-12-31 NOTE — SWALLOW BEDSIDE ASSESSMENT ADULT - SLP GENERAL OBSERVATIONS
Pt received awake in bed Ox3, +O2NC, SpO2 99%, daughter, cousin present, son on phone, ok to listen to assessment per pt, pain scale 0/10 pre & post eval

## 2019-12-31 NOTE — CONSULT NOTE ADULT - PROBLEM SELECTOR RECOMMENDATION 9
Patient responding appropriately to name, , location during our encounter this morning  Continue to reorient to surroundings, procedures, and people as needed

## 2019-12-31 NOTE — PROGRESS NOTE ADULT - ASSESSMENT
This  65 y/o F with a h/o dCHF, moderate pHTN, CAD, CKD, HTN, DM, cirrhosis, portal gastropathy, recurrent GIB, blind, large sacral wound, presents to the ED from NH with AMS, hypoxemia, and hypotension. Patient had large melenotic BM in the ED. H&H: 5.8/17. Lactate: 2.7. KEITH on CKD. Hyperkalemic. Volume resuscitation began with 3u PRBC. Patient minimally responsive and moaning in pain. UA(+) with thick tan urine in fontenot. Developed progressive respiratory distress and hypoxemia requiring NIPPV. (26 Dec 2019 03:58)    Patient was managed by medical team for Encephalopathy, which was improving, thought to be multifactorial- Hepatic, Uremic. CT head negative, EEG without seizures   She also had  acute on chronic diastolic CHF/ Acute hypoxic respiratory failure.  For her acute blood loss Anemia , she responded to blood transfusion, no signs of active bleeding at this time.  She is on Protonix, octreotide- total 5 days through 12/30/19, abx for SBP prophylaxis.  She has known CASTAÑEDA Cirrhosis with portal gastropathy and AVMs, on Lactulose and rifaximin, followed by GI team.  For her KEITH/ATN on CKD, she has been getting intermittent dialysis in hospital.     During initial part of hospital stay, she had fever of T100 and is being treated for sepsis.  Her Ucx was negative and her Blood cx was also negative .  There was a right sided infiltrate on CXR, was on zosyn at nursing home being treated for the same. During admission, she was changed to meropenem x 5 days, with planned end date 1/1/20  She also has an unstageable sacral decub debrided on 12/28/19,  and pending possible OR debridement 12/31/19.       Impression:  fevers,   possible sepsis  recent PNA at SNF    - Still with low grade temp  Cultures remain negative  - patient had been on gram negative coverage with Ceftriaxone --> Zosyn --> Merrem   - agree with completion of course on  1/1/2020.   observe off antibiotics thereafter  - no OBJECTIONS from ID for sacral debridement.  Will order Vancomycin 1 gram x 1 dose for OR.       - follow up all outstanding cultures  - trend temperature and WBC curve  - repeat cultures from blood and all sources if febrile.

## 2019-12-31 NOTE — PROGRESS NOTE ADULT - SUBJECTIVE AND OBJECTIVE BOX
Patient is a 64y old  Female who presents with a chief complaint of Acute hypoxemic respiratory failure, GIB (31 Dec 2019 08:20)      HPI:  65 y/o F with a h/o dCHF, moderate pHTN, CAD, CKD, HTN, DM, cirrhosis, portal gastropathy, recurrent GIB, blind, large sacral wound, presents to the ED from NH with AMS, hypoxemia, and hypotension.     NO further bleeding as per PA at bedside ( placing NGT). Pt is lethargic and not able to give hx. hemoglobin stable at 8.4. Pt scheduled for OR today, but may not go secondary to encephopathy as per PA      REVIEW OF SYSTEMS:  unable to obtain      PAST MEDICAL & SURGICAL HISTORY:  Legally blind in right eye, as defined in [place]  CAD in native artery  Chronic CHF  DM (diabetes mellitus), type 2  GERD (gastroesophageal reflux disease)  Hyperlipidemia  HTN (hypertension)  Congestive heart failure, unspecified HF chronicity, unspecified heart failure type  Insomnia  Retinal detachment  CAD (coronary artery disease)  Glaucoma  Urinary retention  Peripheral neuropathy  GERD (gastroesophageal reflux disease)  Depression  Diabetes  Hypertension, unspecified type  Herniated disc, cervical  PAD (peripheral artery disease)  Legally blind  History of peripheral vascular disease  History of retinal detachment  History of CEA (carotid endarterectomy): Right  Hyperlipidemia  Glaucoma  Hypertension  Diabetes  H/O carotid endarterectomy  S/P CABG (coronary artery bypass graft)  No significant past surgical history  S/P CABG x 1  After cataract  Uveitic glaucoma of both eyes  Detached retina  Atherosclerosis of right carotid artery   delivery delivered      FAMILY HISTORY:  No pertinent family history in first degree relatives  FH: type 2 diabetes: mom and dad      SOCIAL HISTORY:  Smoking Status: [ ] Current, [ ] Former, [ ] Never  Pack Years:  [  ] EtOH-no  [  ] IVDA    MEDICATIONS:  MEDICATIONS  (STANDING):  albuterol/ipratropium for Nebulization. 3 milliLiter(s) Nebulizer every 6 hours  chlorhexidine 4% Liquid 1 Application(s) Topical <User Schedule>  collagenase Ointment 1 Application(s) Topical daily  dextrose 5%. 1000 milliLiter(s) (50 mL/Hr) IV Continuous <Continuous>  dextrose 50% Injectable 12.5 Gram(s) IV Push once  dextrose 50% Injectable 25 Gram(s) IV Push once  dextrose 50% Injectable 25 Gram(s) IV Push once  epoetin sara Injectable 33313 Unit(s) IV Push <User Schedule>  insulin lispro (HumaLOG) corrective regimen sliding scale   SubCutaneous every 6 hours  lactulose Syrup 20 Gram(s) Oral three times a day  meropenem  IVPB 500 milliGRAM(s) IV Intermittent every 24 hours  pantoprazole  Injectable 40 milliGRAM(s) IV Push two times a day  rifAXIMin 550 milliGRAM(s) Oral two times a day    MEDICATIONS  (PRN):  acetaminophen   Tablet .. 650 milliGRAM(s) Oral every 6 hours PRN Temp greater or equal to 38C (100.4F), Mild Pain (1 - 3)  dextrose 40% Gel 15 Gram(s) Oral once PRN Blood Glucose LESS THAN 70 milliGRAM(s)/deciLiter  glucagon  Injectable 1 milliGRAM(s) IntraMuscular once PRN Glucose <70 milliGRAM(s)/deciLiter  midodrine. 10 milliGRAM(s) Oral three times a day PRN if SBP 90 or less give 1 hour before HD  traMADol 50 milliGRAM(s) Oral every 8 hours PRN Severe Pain (7 - 10)      Allergies    ACE inhibitors (Other (Mild))    Intolerances    oxycodone (Sedation/Somnol)      Vital Signs Last 24 Hrs  T(C): 37 (31 Dec 2019 08:27), Max: 37.9 (31 Dec 2019 08:05)  T(F): 98.6 (31 Dec 2019 08:27), Max: 100.2 (31 Dec 2019 08:05)  HR: 79 (31 Dec 2019 09:44) (73 - 89)  BP: 104/53 (31 Dec 2019 08:27) (84/64 - 108/49)  BP(mean): 68 (31 Dec 2019 08:27) (57 - 72)  RR: 11 (31 Dec 2019 08:27) (11 - 22)  SpO2: 100% (31 Dec 2019 09:44) (95% - 100%)     @ 07:01  -   @ 07:00  --------------------------------------------------------  IN: 890 mL / OUT: 0 mL / NET: 890 mL          PHYSICAL EXAM:    General: overweight -   HEENT: MMM, conjunctiva and sclera clear  H- RRR  L- CTA  Gastrointestinal: Soft, non-tender non-distended; Normal bowel sounds; No rebound or guarding  Extremities: Normal range of motion, No clubbing, cyanosis or edema  Neurological: lethargic. Not altert or oriented  Skin: Warm and dry. No obvious rash      LABS:                        8.4    13.20 )-----------( 151      ( 31 Dec 2019 05:13 )             25.2     31 Dec 2019 05:13    136    |  100    |  26.0   ----------------------------<  147    3.8     |  27.0   |  2.68     Ca    7.7        31 Dec 2019 05:13                RADIOLOGY & ADDITIONAL STUDIES:    < from: CT Chest No Cont (19 @ 09:28) >  MPRESSION:     Right upper lobe consolidation with additional smaller right upper lobe opacities, likely pneumonia.    Small bilateral pleural effusions with groundglass attenuation throughout the majority of the lungs and interlobular septal thickening, likely pulmonary edema.    Right lower lobe opacity with an appearance suggestive of rounded atelectasis.    Patchy left upper lobe opacities; differentialincludes infectious/inflammatory etiologies or pulmonary edema.    New 7 mm left upper lobe pulmonary nodule.    Diffuse anasarca.    A follow-up noncontrast chest CT is recommended in 6 weeks following treatment.      < end of copied text >

## 2019-12-31 NOTE — PROGRESS NOTE ADULT - ASSESSMENT
64yoF with hypovolemic shock for possible upper GI bleed, hypoxemia, blood loss anemia, metabolic acidosis, UTI, KEITH on CKD with an un-stageable sacral ulcer - s/p bedside debridements NOW twice BY SURGERY TEAM      - daily dressing changes with santyl   - NPO for OR debridement today    DISPO as per primary team

## 2019-12-31 NOTE — SWALLOW BEDSIDE ASSESSMENT ADULT - SWALLOW EVAL: DIAGNOSIS
At least mild oral dysphagia marked by reduced lingual ROM/strength/coordination, suspect posterior loss.  Suspect pharyngeal dysphagia due to +cough immediately post swallow after mechanical soft, soft, thin, nectar & honey thick liquids, suggestive of airway penetration and/or aspiration.

## 2020-01-01 LAB
ANION GAP SERPL CALC-SCNC: 11 MMOL/L — SIGNIFICANT CHANGE UP (ref 5–17)
BUN SERPL-MCNC: 30 MG/DL — HIGH (ref 8–20)
CALCIUM SERPL-MCNC: 7.8 MG/DL — LOW (ref 8.6–10.2)
CHLORIDE SERPL-SCNC: 103 MMOL/L — SIGNIFICANT CHANGE UP (ref 98–107)
CO2 SERPL-SCNC: 24 MMOL/L — SIGNIFICANT CHANGE UP (ref 22–29)
CREAT SERPL-MCNC: 3.31 MG/DL — HIGH (ref 0.5–1.3)
GLUCOSE BLDC GLUCOMTR-MCNC: 105 MG/DL — HIGH (ref 70–99)
GLUCOSE BLDC GLUCOMTR-MCNC: 109 MG/DL — HIGH (ref 70–99)
GLUCOSE BLDC GLUCOMTR-MCNC: 163 MG/DL — HIGH (ref 70–99)
GLUCOSE BLDC GLUCOMTR-MCNC: 172 MG/DL — HIGH (ref 70–99)
GLUCOSE SERPL-MCNC: 163 MG/DL — HIGH (ref 70–115)
HCT VFR BLD CALC: 26.9 % — LOW (ref 34.5–45)
HGB BLD-MCNC: 8.8 G/DL — LOW (ref 11.5–15.5)
MCHC RBC-ENTMCNC: 28 PG — SIGNIFICANT CHANGE UP (ref 27–34)
MCHC RBC-ENTMCNC: 32.7 GM/DL — SIGNIFICANT CHANGE UP (ref 32–36)
MCV RBC AUTO: 85.7 FL — SIGNIFICANT CHANGE UP (ref 80–100)
PLATELET # BLD AUTO: 158 K/UL — SIGNIFICANT CHANGE UP (ref 150–400)
POTASSIUM SERPL-MCNC: 4 MMOL/L — SIGNIFICANT CHANGE UP (ref 3.5–5.3)
POTASSIUM SERPL-SCNC: 4 MMOL/L — SIGNIFICANT CHANGE UP (ref 3.5–5.3)
RBC # BLD: 3.14 M/UL — LOW (ref 3.8–5.2)
RBC # FLD: 19.9 % — HIGH (ref 10.3–14.5)
SODIUM SERPL-SCNC: 138 MMOL/L — SIGNIFICANT CHANGE UP (ref 135–145)
WBC # BLD: 10.82 K/UL — HIGH (ref 3.8–10.5)
WBC # FLD AUTO: 10.82 K/UL — HIGH (ref 3.8–10.5)

## 2020-01-01 PROCEDURE — 99232 SBSQ HOSP IP/OBS MODERATE 35: CPT

## 2020-01-01 PROCEDURE — 99231 SBSQ HOSP IP/OBS SF/LOW 25: CPT

## 2020-01-01 PROCEDURE — 99233 SBSQ HOSP IP/OBS HIGH 50: CPT

## 2020-01-01 RX ORDER — PANTOPRAZOLE SODIUM 20 MG/1
40 TABLET, DELAYED RELEASE ORAL
Refills: 0 | Status: DISCONTINUED | OUTPATIENT
Start: 2020-01-01 | End: 2020-01-23

## 2020-01-01 RX ORDER — MORPHINE SULFATE 50 MG/1
1 CAPSULE, EXTENDED RELEASE ORAL EVERY 4 HOURS
Refills: 0 | Status: DISCONTINUED | OUTPATIENT
Start: 2020-01-01 | End: 2020-01-08

## 2020-01-01 RX ORDER — LACTULOSE 10 G/15ML
200 SOLUTION ORAL
Refills: 0 | Status: DISCONTINUED | OUTPATIENT
Start: 2020-01-01 | End: 2020-01-13

## 2020-01-01 RX ORDER — ACETAMINOPHEN 500 MG
650 TABLET ORAL EVERY 6 HOURS
Refills: 0 | Status: DISCONTINUED | OUTPATIENT
Start: 2020-01-01 | End: 2020-01-23

## 2020-01-01 RX ADMIN — TRAMADOL HYDROCHLORIDE 50 MILLIGRAM(S): 50 TABLET ORAL at 08:59

## 2020-01-01 RX ADMIN — MORPHINE SULFATE 1 MILLIGRAM(S): 50 CAPSULE, EXTENDED RELEASE ORAL at 23:25

## 2020-01-01 RX ADMIN — MORPHINE SULFATE 1 MILLIGRAM(S): 50 CAPSULE, EXTENDED RELEASE ORAL at 17:56

## 2020-01-01 RX ADMIN — MORPHINE SULFATE 1 MILLIGRAM(S): 50 CAPSULE, EXTENDED RELEASE ORAL at 22:55

## 2020-01-01 RX ADMIN — Medication 2: at 08:40

## 2020-01-01 RX ADMIN — PANTOPRAZOLE SODIUM 40 MILLIGRAM(S): 20 TABLET, DELAYED RELEASE ORAL at 08:48

## 2020-01-01 RX ADMIN — Medication 2: at 12:10

## 2020-01-01 RX ADMIN — TRAMADOL HYDROCHLORIDE 50 MILLIGRAM(S): 50 TABLET ORAL at 09:15

## 2020-01-01 RX ADMIN — Medication 3 MILLILITER(S): at 20:57

## 2020-01-01 RX ADMIN — PANTOPRAZOLE SODIUM 40 MILLIGRAM(S): 20 TABLET, DELAYED RELEASE ORAL at 17:37

## 2020-01-01 RX ADMIN — Medication 3 MILLILITER(S): at 09:16

## 2020-01-01 RX ADMIN — TRAMADOL HYDROCHLORIDE 50 MILLIGRAM(S): 50 TABLET ORAL at 00:32

## 2020-01-01 RX ADMIN — MORPHINE SULFATE 1 MILLIGRAM(S): 50 CAPSULE, EXTENDED RELEASE ORAL at 17:35

## 2020-01-01 RX ADMIN — CHLORHEXIDINE GLUCONATE 1 APPLICATION(S): 213 SOLUTION TOPICAL at 08:49

## 2020-01-01 RX ADMIN — Medication 3 MILLILITER(S): at 16:32

## 2020-01-01 RX ADMIN — Medication 1 APPLICATION(S): at 08:49

## 2020-01-01 RX ADMIN — LACTULOSE 200 GRAM(S): 10 SOLUTION ORAL at 18:25

## 2020-01-01 RX ADMIN — Medication 3 MILLILITER(S): at 02:37

## 2020-01-01 RX ADMIN — TRAMADOL HYDROCHLORIDE 50 MILLIGRAM(S): 50 TABLET ORAL at 01:02

## 2020-01-01 NOTE — PROGRESS NOTE ADULT - SUBJECTIVE AND OBJECTIVE BOX
INTERVAL HPI/OVERNIGHT EVENTS:  Patient unable to provide subjective, nonverbal.   OR rescheduled for 12/3/19 for debridement   Examined wound at bedside, dressing changed    MEDICATIONS  (STANDING):  albuterol/ipratropium for Nebulization. 3 milliLiter(s) Nebulizer every 6 hours  chlorhexidine 4% Liquid 1 Application(s) Topical <User Schedule>  collagenase Ointment 1 Application(s) Topical daily  dextrose 5%. 1000 milliLiter(s) (50 mL/Hr) IV Continuous <Continuous>  dextrose 50% Injectable 12.5 Gram(s) IV Push once  dextrose 50% Injectable 25 Gram(s) IV Push once  dextrose 50% Injectable 25 Gram(s) IV Push once  epoetin sara Injectable 75881 Unit(s) IV Push <User Schedule>  insulin lispro (HumaLOG) corrective regimen sliding scale   SubCutaneous every 6 hours  lactulose Syrup 20 Gram(s) Oral three times a day  pantoprazole   Suspension 40 milliGRAM(s) Oral two times a day  rifAXIMin 550 milliGRAM(s) Oral two times a day    MEDICATIONS  (PRN):  acetaminophen   Tablet .. 650 milliGRAM(s) Oral every 6 hours PRN Temp greater or equal to 38C (100.4F), Mild Pain (1 - 3)  dextrose 40% Gel 15 Gram(s) Oral once PRN Blood Glucose LESS THAN 70 milliGRAM(s)/deciLiter  glucagon  Injectable 1 milliGRAM(s) IntraMuscular once PRN Glucose <70 milliGRAM(s)/deciLiter  midodrine. 10 milliGRAM(s) Oral three times a day PRN if SBP 90 or less give 1 hour before HD  traMADol 50 milliGRAM(s) Oral every 8 hours PRN Severe Pain (7 - 10)      Vital Signs Last 24 Hrs  T(C): 36.6 (01 Jan 2020 04:00), Max: 37.9 (31 Dec 2019 08:05)  T(F): 97.8 (01 Jan 2020 04:00), Max: 100.2 (31 Dec 2019 08:05)  HR: 71 (01 Jan 2020 04:00) (70 - 90)  BP: 108/52 (01 Jan 2020 04:00) (104/53 - 123/54)  BP(mean): 69 (01 Jan 2020 04:00) (68 - 78)  RR: 18 (01 Jan 2020 04:00) (11 - 21)  SpO2: 99% (01 Jan 2020 04:00) (96% - 100%)    Physical Exam:    GEN: NAD  HEENT: EOMI grossly   CV: RRR  Resp: non-invasive positive pressure ventilation  GI: soft, nd, nttp  Back: 6x6 sacral ulcer with necrotic skin borders w/p fibrinous exudative tissue overlying sacrum, santyl applied      LABS:                        8.8    10.82 )-----------( 158      ( 01 Jan 2020 04:51 )             26.9     01-01    138  |  103  |  30.0<H>  ----------------------------<  163<H>  4.0   |  24.0  |  3.31<H>    Ca    7.8<L>      01 Jan 2020 04:51      PT/INR - ( 31 Dec 2019 05:13 )   PT: 15.9 sec;   INR: 1.37 ratio         PTT - ( 31 Dec 2019 05:13 )  PTT:31.1 sec Yes

## 2020-01-01 NOTE — PROGRESS NOTE ADULT - SUBJECTIVE AND OBJECTIVE BOX
HEALTH ISSUES - PROBLEM Dx:    lethargy, large decub, oropharyngeal dysphagia    INTERVAL HPI/ OVERNIGHT EVENTS:    Pt is awake and alert and makes her needs known clearly  But when explained - her diagnosis, plan she keeps saying " Ok, OK..." repeatedly  When explained she needs debridement in OR under GA and her family is refusing same, she keeps saying Ok,   when asked if she wants the procedure she says OK      REVIEW OF SYSTEMS:    c/o decub pain     Vital Signs Last 24 Hrs  T(C): 36.6 (01 Jan 2020 08:45), Max: 36.9 (31 Dec 2019 16:00)  T(F): 97.8 (01 Jan 2020 08:45), Max: 98.4 (31 Dec 2019 16:00)  HR: 75 (01 Jan 2020 12:00) (70 - 90)  BP: 111/54 (01 Jan 2020 12:00) (106/57 - 123/66)  BP(mean): 69 (01 Jan 2020 12:00) (69 - 83)  RR: 8 (01 Jan 2020 12:00) (8 - 18)  SpO2: 100% (01 Jan 2020 12:00) (96% - 100%)    PHYSICAL EXAM-  GENERAL: awake and alert and making needs known  HEAD:  Atraumatic, Normocephalic  EYES: , conjunctiva and sclera clear, right sided blind/white eye  ENT: Moist mucous membranes,  No lesions  NECK: Supple, No JVD, Normal thyroid  NERVOUS SYSTEM:  AAO x 2, unable to comprehend her medical condition, moving her UE   CHEST/LUNG: Diminished but CTA bilaterally; No rales, rhonchi, wheezing, or rubs  HEART: Regular rate and rhythm; No murmurs, rubs, or gallops  ABDOMEN: Soft, Nontender, Nondistended; Bowel sounds present, Large Sacral Decub ulcer unstageable s/p debridement  x 2  EXTREMITIES:  2+ Peripheral Pulses, No clubbing, cyanosis, or edema    MEDICATIONS  (STANDING):  albuterol/ipratropium for Nebulization. 3 milliLiter(s) Nebulizer every 6 hours  chlorhexidine 4% Liquid 1 Application(s) Topical <User Schedule>  collagenase Ointment 1 Application(s) Topical daily  dextrose 5%. 1000 milliLiter(s) (50 mL/Hr) IV Continuous <Continuous>  dextrose 50% Injectable 12.5 Gram(s) IV Push once  dextrose 50% Injectable 25 Gram(s) IV Push once  dextrose 50% Injectable 25 Gram(s) IV Push once  epoetin sara Injectable 20680 Unit(s) IV Push <User Schedule>  insulin lispro (HumaLOG) corrective regimen sliding scale   SubCutaneous every 6 hours  lactulose Retention Enema 200 Gram(s) Rectal two times a day  pantoprazole  Injectable 40 milliGRAM(s) IV Push two times a day    MEDICATIONS  (PRN):  acetaminophen  Suppository .. 650 milliGRAM(s) Rectal every 6 hours PRN Temp greater or equal to 38C (100.4F), Mild Pain (1 - 3)  dextrose 40% Gel 15 Gram(s) Oral once PRN Blood Glucose LESS THAN 70 milliGRAM(s)/deciLiter  glucagon  Injectable 1 milliGRAM(s) IntraMuscular once PRN Glucose <70 milliGRAM(s)/deciLiter  morphine  - Injectable 1 milliGRAM(s) SubCutaneous every 4 hours PRN Moderate Pain (4 - 6)      LABS:                        8.8    10.82 )-----------( 158      ( 01 Jan 2020 04:51 )             26.9     01-01    138  |  103  |  30.0<H>  ----------------------------<  163<H>  4.0   |  24.0  |  3.31<H>    Ca    7.8<L>      01 Jan 2020 04:51      PT/INR - ( 31 Dec 2019 05:13 )   PT: 15.9 sec;   INR: 1.37 ratio         PTT - ( 31 Dec 2019 05:13 )  PTT:31.1 sec

## 2020-01-01 NOTE — PROGRESS NOTE ADULT - ASSESSMENT
This  65 y/o F with a h/o dCHF, moderate pHTN, CAD, CKD, HTN, DM, cirrhosis, portal gastropathy, recurrent GIB, blind, large sacral wound, presents to the ED from NH with AMS, hypoxemia, and hypotension. Patient had large melenotic BM in the ED. H&H: 5.8/17. Lactate: 2.7. KEITH on CKD. Hyperkalemic. Volume resuscitation began with 3u PRBC. Patient minimally responsive and moaning in pain. UA(+) with thick tan urine in fontenot. Developed progressive respiratory distress and hypoxemia requiring NIPPV. (26 Dec 2019 03:58)    Patient was managed by medical team for Encephalopathy, which was improving, thought to be multifactorial- Hepatic, Uremic. CT head negative, EEG without seizures   She also had  acute on chronic diastolic CHF/ Acute hypoxic respiratory failure.  For her acute blood loss Anemia , she responded to blood transfusion, no signs of active bleeding at this time.  She is on Protonix, octreotide- total 5 days through 12/30/19, abx for SBP prophylaxis.  She has known CASTAÑEDA Cirrhosis with portal gastropathy and AVMs, on Lactulose and rifaximin, followed by GI team.  For her KEITH/ATN on CKD, she has been getting intermittent dialysis in hospital.     During initial part of hospital stay, she had fever of T100 and is being treated for sepsis.  Her Ucx was negative and her Blood cx was also negative .  There was a right sided infiltrate on CXR, was on zosyn at nursing home being treated for the same. During admission, she was changed to meropenem x 5 days, with planned end date 1/1/20  She also has an unstageable sacral decub debrided on 12/28/19,  and pending possible OR debridement 12/31/19.       Impression:  fevers,   possible sepsis  recent PNA at SNF    - Still with low grade temp  Cultures remain negative   completion of course of antibiotics on 1/1/2020.   Antibiotics Course:  cefTRIAXone   IVPB   100 mL/Hr (12-26-19 @ 05:20)   100 mL/Hr (12-27-19 @ 06:18)    piperacillin/tazobactam IVPB.   200 mL/Hr (12-27-19 @ 11:05)    meropenem  IVPB   100 mL/Hr (12-31-19 @ 13:24)   100 mL/Hr (12-30-19 @ 12:47)   100 mL/Hr (12-29-19 @ 13:14)   100 mL/Hr (12-28-19 @ 14:57)   100 mL/Hr (12-27-19 @ 13:41)       observe off antibiotics thereafter  - no OBJECTIONS from ID for sacral debridement.   Pre-op antibiotics per surgical team      Please call back for any new concerns.

## 2020-01-01 NOTE — PROGRESS NOTE ADULT - SUBJECTIVE AND OBJECTIVE BOX
[facility]  INFECTIOUS DISEASES AND INTERNAL MEDICINE at [place]  =======================================================  Matt Jackson MD  Diplomates American Board of Internal Medicine and Infectious Diseases  Telephone [phone]  Fax            [phone]  =======================================================    [id number]  KUN LAVINIARESURPRIS   follow up: for low grade temps, Abx for PNA, sacral decubitus    no more fevers  able to answer minimal questions  finished antibiotics today  =======================================================     REVIEW OF SYSTEMS:  Limited due to medical condition    =======================================================  Allergies  ACE inhibitors (Other (Mild))  oxycodone (Sedation/Somnol)    Antibiotics:  rifAXIMin 550 milliGRAM(s) Oral two times a day    Other medications:  albuterol/ipratropium for Nebulization. 3 milliLiter(s) Nebulizer every 6 hours  chlorhexidine 4% Liquid 1 Application(s) Topical <User Schedule>  collagenase Ointment 1 Application(s) Topical daily  dextrose 5%. 1000 milliLiter(s) IV Continuous <Continuous>  dextrose 50% Injectable 12.5 Gram(s) IV Push once  dextrose 50% Injectable 25 Gram(s) IV Push once  dextrose 50% Injectable 25 Gram(s) IV Push once  epoetin sara Injectable 24617 Unit(s) IV Push <User Schedule>  insulin lispro (HumaLOG) corrective regimen sliding scale   SubCutaneous every 6 hours  lactulose Syrup 20 Gram(s) Oral three times a day  pantoprazole   Suspension 40 milliGRAM(s) Oral two times a day    ======================================================  Physical Exam:  ============  T(F): 97.8 (01 Jan 2020 08:45), Max: 98.4 (31 Dec 2019 16:00)  HR: 77 (01 Jan 2020 09:17)  BP: 123/66 (01 Jan 2020 08:00)  RR: 14 (01 Jan 2020 08:00)  SpO2: 98% (01 Jan 2020 09:17) (96% - 100%)  temp max in last 48H T(F): , Max: 100.2 (12-31-19 @ 08:05)    General:  No acute distress.  OBESE  Eye: Pupils are equal, round and reactive to light, Extraocular movements are intact, Normal conjunctiva.  HENT: Normocephalic, Oral mucosa is DRY  Neck: Supple, No lymphadenopathy.  + RIGHT neck HD catheter  Respiratory: Lungs with coarse breath sound anteriorly  Cardiovascular: Normal rate, Regular rhythm,   + ANASARCA  Gastrointestinal: Soft, Non-tender, Non-distended, Normal bowel sounds.  Genitourinary: No costovertebral angle tenderness.  Lymphatics: No lymphadenopathy neck,   Musculoskeletal: no joint abnl  Integumentary: No rash.  Neurologic: sedated, not fully assessed  Psychiatric: not evaluated.    =======================================================  Labs:                        8.8    10.82 )-----------( 158      ( 01 Jan 2020 04:51 )             26.9       WBC Count: 10.82 K/uL (01-01-20 @ 04:51)  WBC Count: 13.20 K/uL (12-31-19 @ 05:13)  WBC Count: 12.79 K/uL (12-30-19 @ 13:52)  WBC Count: 14.27 K/uL (12-29-19 @ 06:42)  WBC Count: 14.93 K/uL (12-28-19 @ 14:53)  WBC Count: 14.55 K/uL (12-28-19 @ 05:01)  WBC Count: 14.85 K/uL (12-27-19 @ 18:05)      01-01    138  |  103  |  30.0<H>  ----------------------------<  163<H>  4.0   |  24.0  |  3.31<H>    Ca    7.8<L>      01 Jan 2020 04:51        Culture - Urine (collected 12-26-19 @ 02:26)  Source: .Urine  Final Report (12-27-19 @ 12:00):    Culture grew 3 or more types of organisms which indicate    collection contamination; consider recollection only if clinically    indicated.    ,    TYPE: (C=Critical, N=Notification, A=Abnormal) n    TESTS:  _ urine contamination    DATE/TIME CALLED: _ 12/27/2019 11:59:55    CALLED TO: _ darius meza rn    READ BACK (2 Patient Identifiers)(Y/N): _ y    READ BACK VALUES (Y/N): _ y    CALLED BY: Anais sanchez    Culture - Blood (collected 12-26-19 @ 00:45)  Source: .Blood  Final Report (12-31-19 @ 01:00):    No growth at 5 days.    Culture - Blood (collected 12-26-19 @ 00:45)  Source: .Blood  Final Report (12-31-19 @ 01:00):    No growth at 5 days.

## 2020-01-01 NOTE — PROGRESS NOTE ADULT - SUBJECTIVE AND OBJECTIVE BOX
Chief Complaint: ESRD/Ongoing hemodialysis requirement    24 hour events/subjective: Hemodynamically stable, On NC Oxygen, IN NAD,     Last HD > 2 days ago, Labs * Meds Reviewed,    D/W HD RN,     PAST HISTORY  --------------------------------------------------------------------------------  No significant changes to PMH, PSH, FHx, SHx, unless otherwise noted    ALLERGIES & MEDICATIONS  --------------------------------------------------------------------------------  Allergies  ACE inhibitors (Other (Mild))    Intolerances  oxycodone (Sedation/Somnol)    Standing Inpatient Medications  albuterol/ipratropium for Nebulization. 3 milliLiter(s) Nebulizer every 6 hours  chlorhexidine 4% Liquid 1 Application(s) Topical <User Schedule>  collagenase Ointment 1 Application(s) Topical daily  dextrose 5%. 1000 milliLiter(s) IV Continuous <Continuous>  dextrose 50% Injectable 12.5 Gram(s) IV Push once  dextrose 50% Injectable 25 Gram(s) IV Push once  dextrose 50% Injectable 25 Gram(s) IV Push once  epoetin sara Injectable 89375 Unit(s) IV Push <User Schedule>  insulin lispro (HumaLOG) corrective regimen sliding scale   SubCutaneous every 6 hours  lactulose Syrup 10 Gram(s) Oral two times a day  meropenem  IVPB 500 milliGRAM(s) IV Intermittent every 24 hours  pantoprazole  Injectable 40 milliGRAM(s) IV Push two times a day  rifAXIMin 550 milliGRAM(s) Oral two times a day    PRN Inpatient Medications  acetaminophen   Tablet .. 650 milliGRAM(s) Oral every 6 hours PRN  dextrose 40% Gel 15 Gram(s) Oral once PRN  glucagon  Injectable 1 milliGRAM(s) IntraMuscular once PRN  midodrine. 10 milliGRAM(s) Oral three times a day PRN  traMADol 50 milliGRAM(s) Oral every 8 hours PRN    REVIEW OF SYSTEMS  --------------------------------------------------------------------------------  Gen: No weight changes, fatigue, fevers/chills, weakness  Skin: No rashes  Head/Eyes/Ears/Mouth: Blind. No headache; Normal hearing;  Respiratory: No dyspnea, cough, wheezing, hemoptysis  CV: No chest pain, PND, orthopnea  GI: No abdominal pain, diarrhea, constipation, nausea, vomiting, melena, hematochezia  : No increased frequency, dysuria, hematuria, nocturia  MSK: No joint pain/swelling; no back pain; no edema  Neuro: No dizziness/lightheadedness, weakness, seizures, numbness, tingling  Heme: No easy bruising or bleeding  Endo: No heat/cold intolerance  Psych: No significant nervousness, anxiety, stress, depression    All other systems were reviewed and are negative, except as noted.    VITALS/PHYSICAL EXAM  --------------------------------------------------------------------------------    Vital Signs Last 48 Hrs,    T(C): 36.4 (2020 21:26), Max: 36.6 (2020 04:00)  T(F): 97.5 (2020 21:26), Max: 97.8 (2020 04:00)  HR: 85 (2020 23:52) (68 - 85)  BP: 122/73 (2020 21:26) (108/52 - 131/66)  BP(mean): 68 (2020 20:00) (68 - 85)  RR: 20 (2020 21:26) (8 - 20)  SpO2: 96% (2020 23:52) (96% - 100%)    T(C): 37.9 (19 @ 08:05), Max: 37.9 (19 @ 08:05)  HR: 80 (19 @ 04:00) (73 - 89)  BP: 99/53 (19 @ 06:47) (84/64 - 108/49)  RR: 19 (19 @ 04:00) (13 - 22)  SpO2: 96% (19 @ 04:00) (95% - 100%)    19 @ 07:01  -  19 @ 07:00  --------------------------------------------------------  IN: 890 mL / OUT: 0 mL / NET: 890 mL    Physical Exam:  	Gen: NAD, well-appearing  	HEENT: Supple neck  	Pulm: Coarse breath sounds bilaterally  	CV: RRR, S1S2; no rub  	Back: No spinal or CVA tenderness  	Abd: +BS, soft, nontender/nondistended  	: No suprapubic tenderness  	UE: Warm, no clubbing, no edema; no asterixis  	LE: Warm, no clubbing, no edema  	Neuro: No focal deficits  	Psych: Normal affect and mood  	Skin: Warm, without rashes  	Vascular access: IRMA non-tunneled HD catheter    LABS/STUDIES  --------------------------------------------------------------------------------    138    |  103    |  30.0<H>  ----------------------------<  163<H>  Ca:  7.8<L> (2020 04:51)  4.0     |  24.0   |  3.31<H>    eGFR if : 16 <L>                              8.8<L>  10.82<H> )-----------( 158      ( 2020 04:51 )                  26.9<L>    Phos: 4.4 mg/dL M.9 mg/dL .,                8.4    13.20 >-----------<  151      [19 05:13]              25.2     136  |  100  |  26.0  ----------------------------<  147      [19 05:13]  3.8   |  27.0  |  2.68        Ca     7.7     [19 05:13]      PT/INR: PT 15.9 , INR 1.37       [19 05:13]  PTT: 31.1       [19 05:13]      Creatinine Trend:  SCr 2.68 [:13]  SCr 2.41 [ 13:52]  SCr 2.65 [ 06:42]  SCr 3.48 [ 05:01]  SCr 4.20 [ 06:09]    Urinalysis - [19 @ 02:27]      Color Red / Appearance Cloudy / SG 1.020 / pH 6.0      Gluc Negative / Ketone Trace  / Bili Negative / Urobili Negative       Blood Large / Protein 500 / Leuk Est Moderate / Nitrite Negative      RBC >50 / WBC 6-10 / Hyaline  / Gran  / Sq Epi  / Non Sq Epi Occasional / Bacteria Few      Iron 335, TIBC 419, %sat 80      [19 @ 14:55]  Ferritin 102      [19 @ 14:55]  PTH -- (Ca 9.1)      [19 @ 18:09]   107  PTH -- (Ca 7.9)      [19 @ 16:26]   175  PTH -- (Ca 8.1)      [19 @ 16:55]   148  Vitamin D (25OH) 31.1      [19 @ 18:17]  HbA1c 5.8      [12-26-19 @ 09:04]  TSH 1.10      [19 @ 12:21]    HBsAb 18.0      [19 @ 22:01]  HBsAg Nonreact      [19 @ 22:01]  HBcAb Nonreact      [19 @ 22:01]  HCV 0.20, Nonreact      [19 @ 22:01]    1) KEITH on CKD now likely ESRD  2) encephalopathy  4) Acute blood loss anemia superimposed on anemia of CKD  5) Hypotension    Continue Midodrine   Continue QUENTIN with HD  Monitor lytes, BP and U.O.    D/W Ganesh Gongora ( [facility] )  Dr. King,     Plans for a TDC,      Continue current medical & surgical Management,     HD in AM,

## 2020-01-01 NOTE — PROGRESS NOTE ADULT - PROBLEM SELECTOR PLAN 1
bleeding from unclear source- has had EGD and colonoscopy in past. Had + CTA in transverse colon with  negative findings in the transverse colon. NO currenlty GI bleeding and hemoglobin stable  - no further GI workup planned. pt to OR tomorrow for sacral decubitus debridement bleeding from unclear source- has had EGD's in past with portal gastropathy and colonoscopy  3 in past year showing diverticulosis and one Tubular adenoma polyp. colon. NO currenlty GI bleeding and hemoglobin stable  - no further GI workup planned. pt to OR tomorrow for sacral decubitus debridement

## 2020-01-01 NOTE — PROGRESS NOTE ADULT - ASSESSMENT
63 yo female with hx of CHFpEF, CAD, CKD, HTN, DM, cirrhosis, portal gastropathy, recurrent GI bleed, blind, sacral decub ulcer, presented to the ED from nursing home with encephalopathy, hypoxic resp failure requiring bipap, in ED found to have anemia with Hb 5 that responded to blood transfusions (hx of recurrent GI bleed for which multiple EGDs/colonoscopies have been performed, no further invasive work up as per GI), sepsis secondary to PNA on abx, KEITH on CKD now requiring HD via left IJ.     10. Unstageable Sacral Decub  s/p debridement bedside x 2   Surg plans debridement in OR  However family has their concerns about GA and are refusing same for now    11. Questionable medical decision making capacity by Patient  Will get Psych consult to evaluate for same  Since family daryl taking the responsibility for decision making and patient not understanding her situation    12. Oropharyngeal Dysphagia  Currently made NPO and meds changed to other routes  might need NGT placement and or alternate routes of nutrition    1. S/p Metabolic vs Uremic vs Hepatic Encephalopathy  Likely multifactorial - Hepatic, Uremic. Uremia has resolved. Hepatic- responds well to lactulose  S/ S eval- NPO  CT head negative, EEG without seizures      2. Acute on chronic diastolic CHF/ Acute hypoxic respiratory failure  Sec to pulm edema  No longer requiring bipap and now weaned off Hi yazmin O2 and tolerating nasal cannula  Continue to taper supplemental NC O2 as able    3. Acute blood loss Anemia   Hx of recurrent GI bleed? prior EGDs/ colonoscopies this year which were negative, prior CTA with +transverse colon bleed  Seen by GI on this admission, no invasive intervention   Responded to blood transfusions, last transfusion 12/28   No signs of active bleeding at this time.    Protonix, octreotide- total 5 days through 12/30/19 completed  Avoid chemical DVT PPE for now     4. Known CASTAÑEDA Cirrhosis with portal gastropathy and AVMs  Lactulose and rifaximin  Octreotide, PPI    5. KEITH/ATN on CKD  Likely sec to blood loss  Was briefly on dialysis in the past  Renal US showed no hydronephrosis  Seen in consult by nephrology, now again on HD per nephrology via left IJ  No HD today as per nephro, will plan for HD Thursday   Discussed w Dr Castro - pt will likely need long term HD, eventual plan for PermCath / AVF will consult vascular team today   Discussed with vascular, vein mapping and tentative plan for permacath thursday, holding off on AVF planning for now    6. Sepsis  Secondary to PNA, b/l infiltrates on CT R>L  (previously treated with zosyn at NH)  Abx broadened to merrem x 5 days, last day 1/1/20  Ucx negative- Blood cx negative   Unstageable sacral decub debrided x 2 by surgical team, planning for OR debridement   Cont wound care  ID consulted for any further antibiotic recs     7.  DM2  Well controlled  Cont current regimen    8. s/p Hypotension  Avoid BP meds  Midodrine for now     9. Lung Nodule  Incidental finding on CT  Will need outpatient follow up with pulm for repeat imaging in 6 months     ICDs    Family for the current time want the OR procedure postponed. They want the patient to make the decision. Patient keeps saying OK and Yes to anything and every question asked to her. Will get Psych eval for capacity evaluation

## 2020-01-01 NOTE — PROGRESS NOTE ADULT - ASSESSMENT
64yoF with hypovolemic shock for possible upper GI bleed, hypoxemia, blood loss anemia, metabolic acidosis, UTI, KEITH on CKD with an un-stageable sacral ulcer - s/p bedside debridements.  Dressing changed at bedside  -Plan for OR 1/3/19 for debridement   -Will continue daily dressing changes in preparation for OR

## 2020-01-01 NOTE — PROGRESS NOTE ADULT - SUBJECTIVE AND OBJECTIVE BOX
Patient is a 64y old  Female who presents with a chief complaint of Acute hypoxemic respiratory failure, GIB (2020 14:06)      HPI:  65 y/o F with a h/o dCHF, moderate pHTN, CAD, CKD, HTN, DM, cirrhosis, portal gastropathy, recurrent GIB, blind, large sacral wound,      Today , pt is responding to questions.  She is oriented to person and place ( knew she is in hospital ) . States her back hurst, but denies abdominal pain. Hgb stable.       REVIEW OF SYSTEMS:Poor historian  Gastrointestinal: No abdominal or epigastric pain. No nausea, vomiting or hematemesis; No diarrhea or constipation. No melena or hematochezia.    Musculoskeletal: No joint pain or swelling; No muscle, back or extremity pain    PAST MEDICAL & SURGICAL HISTORY:  Legally blind in right eye, as defined in [place]  CAD in native artery  Chronic CHF  DM (diabetes mellitus), type 2  GERD (gastroesophageal reflux disease)  Hyperlipidemia  HTN (hypertension)  Congestive heart failure, unspecified HF chronicity, unspecified heart failure type  Insomnia  Retinal detachment  CAD (coronary artery disease)  Glaucoma  Urinary retention  Peripheral neuropathy  GERD (gastroesophageal reflux disease)  Depression  Diabetes  Hypertension, unspecified type  Herniated disc, cervical  PAD (peripheral artery disease)  Legally blind  History of peripheral vascular disease  History of retinal detachment  History of CEA (carotid endarterectomy): Right  Hyperlipidemia  Glaucoma  Hypertension  Diabetes  H/O carotid endarterectomy  S/P CABG (coronary artery bypass graft)  No significant past surgical history  S/P CABG x 1  After cataract  Uveitic glaucoma of both eyes  Detached retina  Atherosclerosis of right carotid artery   delivery delivered      FAMILY HISTORY:  No pertinent family history in first degree relatives  FH: type 2 diabetes: mom and dad      SOCIAL HISTORY:  Smoking Status: [ ] Current, [ ] Former, [ ] Never  Pack Years:  [  ] EtOH-no  [  ] IVDA    MEDICATIONS:  MEDICATIONS  (STANDING):  albuterol/ipratropium for Nebulization. 3 milliLiter(s) Nebulizer every 6 hours  chlorhexidine 4% Liquid 1 Application(s) Topical <User Schedule>  collagenase Ointment 1 Application(s) Topical daily  dextrose 5%. 1000 milliLiter(s) (50 mL/Hr) IV Continuous <Continuous>  dextrose 50% Injectable 12.5 Gram(s) IV Push once  dextrose 50% Injectable 25 Gram(s) IV Push once  dextrose 50% Injectable 25 Gram(s) IV Push once  epoetin sara Injectable 20106 Unit(s) IV Push <User Schedule>  insulin lispro (HumaLOG) corrective regimen sliding scale   SubCutaneous every 6 hours  lactulose Retention Enema 200 Gram(s) Rectal two times a day  pantoprazole  Injectable 40 milliGRAM(s) IV Push two times a day    MEDICATIONS  (PRN):  acetaminophen  Suppository .. 650 milliGRAM(s) Rectal every 6 hours PRN Temp greater or equal to 38C (100.4F), Mild Pain (1 - 3)  dextrose 40% Gel 15 Gram(s) Oral once PRN Blood Glucose LESS THAN 70 milliGRAM(s)/deciLiter  glucagon  Injectable 1 milliGRAM(s) IntraMuscular once PRN Glucose <70 milliGRAM(s)/deciLiter  morphine  - Injectable 1 milliGRAM(s) SubCutaneous every 4 hours PRN Moderate Pain (4 - 6)      Allergies    ACE inhibitors (Other (Mild))    Intolerances    oxycodone (Sedation/Somnol)      Vital Signs Last 24 Hrs  T(C): 36.6 (2020 13:43), Max: 36.9 (31 Dec 2019 16:00)  T(F): 97.8 (2020 13:43), Max: 98.4 (31 Dec 2019 16:00)  HR: 75 (2020 12:00) (70 - 90)  BP: 111/54 (2020 12:00) (106/57 - 123/66)  BP(mean): 69 (2020 12:00) (69 - 83)  RR: 8 (2020 12:00) (8 - 18)  SpO2: 100% (2020 12:00) (96% - 100%)        PHYSICAL EXAM:    General: frail  HEENT: MMM, conjunctiva- cloudy- cataract- legally  blind in right eye and sclera clear  H- RRR  Gastrointestinal: Soft, non-tender non-distended; Normal bowel sounds; No rebound or guarding  Extremities: Normal range of motion, No clubbing, cyanosis or edema  Neurological: Alert and oriented x3  Skin: Warm and dry. No obvious rash      LABS:                        8.8    10.82 )-----------( 158      ( 2020 04:51 )             26.9     2020 04:51    138    |  103    |  30.0   ----------------------------<  163    4.0     |  24.0   |  3.31     Ca    7.8        2020 04:51                RADIOLOGY & ADDITIONAL STUDIES:

## 2020-01-02 LAB
24R-OH-CALCIDIOL SERPL-MCNC: 25.2 NG/ML — LOW (ref 30–80)
ALBUMIN SERPL ELPH-MCNC: 2.2 G/DL — LOW (ref 3.3–5.2)
ANION GAP SERPL CALC-SCNC: 12 MMOL/L — SIGNIFICANT CHANGE UP (ref 5–17)
BUN SERPL-MCNC: 37 MG/DL — HIGH (ref 8–20)
CALCIUM SERPL-MCNC: 8 MG/DL — LOW (ref 8.4–10.5)
CALCIUM SERPL-MCNC: 8.1 MG/DL — LOW (ref 8.6–10.2)
CHLORIDE SERPL-SCNC: 100 MMOL/L — SIGNIFICANT CHANGE UP (ref 98–107)
CO2 SERPL-SCNC: 26 MMOL/L — SIGNIFICANT CHANGE UP (ref 22–29)
CREAT SERPL-MCNC: 3.7 MG/DL — HIGH (ref 0.5–1.3)
FERRITIN SERPL-MCNC: 668 NG/ML — HIGH (ref 15–150)
GLUCOSE BLDC GLUCOMTR-MCNC: 108 MG/DL — HIGH (ref 70–99)
GLUCOSE BLDC GLUCOMTR-MCNC: 119 MG/DL — HIGH (ref 70–99)
GLUCOSE BLDC GLUCOMTR-MCNC: 144 MG/DL — HIGH (ref 70–99)
GLUCOSE SERPL-MCNC: 122 MG/DL — HIGH (ref 70–115)
IRON SATN MFR SERPL: 16 % — SIGNIFICANT CHANGE UP (ref 14–50)
IRON SATN MFR SERPL: 25 UG/DL — LOW (ref 37–145)
PHOSPHATE SERPL-MCNC: 5.1 MG/DL — HIGH (ref 2.4–4.7)
POTASSIUM SERPL-MCNC: 4.4 MMOL/L — SIGNIFICANT CHANGE UP (ref 3.5–5.3)
POTASSIUM SERPL-SCNC: 4.4 MMOL/L — SIGNIFICANT CHANGE UP (ref 3.5–5.3)
PTH-INTACT FLD-MCNC: 119 PG/ML — HIGH (ref 15–65)
SODIUM SERPL-SCNC: 138 MMOL/L — SIGNIFICANT CHANGE UP (ref 135–145)
TIBC SERPL-MCNC: 154 UG/DL — LOW (ref 220–430)
TRANSFERRIN SERPL-MCNC: 108 MG/DL — LOW (ref 192–382)

## 2020-01-02 PROCEDURE — 99232 SBSQ HOSP IP/OBS MODERATE 35: CPT

## 2020-01-02 PROCEDURE — 90937 HEMODIALYSIS REPEATED EVAL: CPT

## 2020-01-02 RX ADMIN — Medication 3 MILLILITER(S): at 21:35

## 2020-01-02 RX ADMIN — LACTULOSE 200 GRAM(S): 10 SOLUTION ORAL at 06:38

## 2020-01-02 RX ADMIN — MORPHINE SULFATE 1 MILLIGRAM(S): 50 CAPSULE, EXTENDED RELEASE ORAL at 08:10

## 2020-01-02 RX ADMIN — Medication 3 MILLILITER(S): at 08:46

## 2020-01-02 RX ADMIN — Medication 3 MILLILITER(S): at 02:41

## 2020-01-02 RX ADMIN — PANTOPRAZOLE SODIUM 40 MILLIGRAM(S): 20 TABLET, DELAYED RELEASE ORAL at 19:00

## 2020-01-02 RX ADMIN — MORPHINE SULFATE 1 MILLIGRAM(S): 50 CAPSULE, EXTENDED RELEASE ORAL at 08:25

## 2020-01-02 RX ADMIN — CHLORHEXIDINE GLUCONATE 1 APPLICATION(S): 213 SOLUTION TOPICAL at 06:22

## 2020-01-02 RX ADMIN — PANTOPRAZOLE SODIUM 40 MILLIGRAM(S): 20 TABLET, DELAYED RELEASE ORAL at 06:17

## 2020-01-02 RX ADMIN — Medication 1 APPLICATION(S): at 06:17

## 2020-01-02 NOTE — ED BEHAVIORAL HEALTH NOTE - BEHAVIORAL HEALTH NOTE
Psychiatry consult was requested for evaluation of pt's capacity to made decisions with regard to surgical debridement. Patient is a  65 yo female with hx of CHFpEF, CAD, CKD, HTN, DM, cirrhosis, portal gastropathy, recurrent GI bleed, blind, sacral decub ulcer, presented to the ED from nursing home with encephalopathy, hypoxic resp failure requiring bipap, in ED found to have anemia with Hb 5 that responded to blood transfusions (hx of recurrent GI bleed for which multiple EGDs/colonoscopies have been performed, no further invasive work up as per GI), sepsis secondary to PNA on abx, KEITH on CKD now requiring HD via left IJ.      As per report patient kept repeating "OK" when discussing diagnosis or surgical procedure without seemingly understanding content.    Family is reported not in agreement with procedure.     Attempted to interview patient at 3 pm. Spoke with nurse on floor.  Patient is currently off floor in hemodialysis so unable to complete consult at this time.    .

## 2020-01-02 NOTE — PROGRESS NOTE ADULT - SUBJECTIVE AND OBJECTIVE BOX
HPI/OVERNIGHT EVENTS:    Patient was seen and examined bedside, and found to be in NAD. Pt had a sacral decubitus ulcer debridement scheduled for earlier this week, which was rescheduled for 1/3/20. Pt needed to have Hemodialysis done earlier this week, which is what caused the delay. She will be dialyzed today and have the debridement done tomorrow instead.     MEDICATIONS  (STANDING):  albuterol/ipratropium for Nebulization. 3 milliLiter(s) Nebulizer every 6 hours  chlorhexidine 4% Liquid 1 Application(s) Topical <User Schedule>  collagenase Ointment 1 Application(s) Topical daily  dextrose 5%. 1000 milliLiter(s) (50 mL/Hr) IV Continuous <Continuous>  dextrose 50% Injectable 12.5 Gram(s) IV Push once  dextrose 50% Injectable 25 Gram(s) IV Push once  dextrose 50% Injectable 25 Gram(s) IV Push once  epoetin sara Injectable 36866 Unit(s) IV Push <User Schedule>  insulin lispro (HumaLOG) corrective regimen sliding scale   SubCutaneous every 6 hours  lactulose Retention Enema 200 Gram(s) Rectal two times a day  pantoprazole  Injectable 40 milliGRAM(s) IV Push two times a day    MEDICATIONS  (PRN):  acetaminophen  Suppository .. 650 milliGRAM(s) Rectal every 6 hours PRN Temp greater or equal to 38C (100.4F), Mild Pain (1 - 3)  dextrose 40% Gel 15 Gram(s) Oral once PRN Blood Glucose LESS THAN 70 milliGRAM(s)/deciLiter  glucagon  Injectable 1 milliGRAM(s) IntraMuscular once PRN Glucose <70 milliGRAM(s)/deciLiter  morphine  - Injectable 1 milliGRAM(s) SubCutaneous every 4 hours PRN Moderate Pain (4 - 6)      Vital Signs Last 24 Hrs  T(C): 36.4 (01 Jan 2020 21:26), Max: 36.6 (01 Jan 2020 08:45)  T(F): 97.5 (01 Jan 2020 21:26), Max: 97.8 (01 Jan 2020 08:45)  HR: 84 (02 Jan 2020 03:04) (68 - 85)  BP: 122/73 (01 Jan 2020 21:26) (111/54 - 131/66)  BP(mean): 68 (01 Jan 2020 20:00) (68 - 85)  RR: 20 (01 Jan 2020 21:26) (8 - 20)  SpO2: 95% (02 Jan 2020 03:04) (95% - 100%)    Physical exam:  Constitutional: patient resting comfortably in bed, in no acute distress  HEENT: EOMI / PERRL b/l, no active drainage or redness  Neck: No JVD, full ROM without pain  Respiratory: respirations are unlabored, no accessory muscle use  Cardiovascular: regular rate & rhythm  Gastrointestinal: Abdomen soft, non-tender, non-distended, no rebound tenderness / guarding  Back: 6x6 sacral ulcer with necrotic skin borders w/p fibrinous exudative tissue overlying sacrum, santyl applied. Dressing changed.     I&O's Detail      LABS:                        8.8    10.82 )-----------( 158      ( 01 Jan 2020 04:51 )             26.9     01-01    138  |  103  |  30.0<H>  ----------------------------<  163<H>  4.0   |  24.0  |  3.31<H>    Ca    7.8<L>      01 Jan 2020 04:51      PT/INR - ( 31 Dec 2019 05:13 )   PT: 15.9 sec;   INR: 1.37 ratio         PTT - ( 31 Dec 2019 05:13 )  PTT:31.1 sec

## 2020-01-02 NOTE — PROGRESS NOTE ADULT - SUBJECTIVE AND OBJECTIVE BOX
HEALTH ISSUES - PROBLEM Dx:    lethargy, large decub, oropharyngeal dysphagia    INTERVAL HPI/ OVERNIGHT EVENTS:    Pt is awake and alert and makes her needs known clearly  But when explained - her diagnosis, plan she keeps saying " Ok, OK..." repeatedly  explained all this to family at bedside and family on the phone at length    REVIEW OF SYSTEMS:    c/o decub pain     Vital Signs Last 24 Hrs  T(C): 36.6 (02 Jan 2020 13:17), Max: 36.6 (02 Jan 2020 13:17)  T(F): 97.9 (02 Jan 2020 13:17), Max: 97.9 (02 Jan 2020 13:17)  HR: 80 (02 Jan 2020 13:17) (68 - 86)  BP: 111/56 (02 Jan 2020 13:17) (111/56 - 125/77)  BP(mean): 68 (01 Jan 2020 20:00) (68 - 68)  RR: 18 (02 Jan 2020 13:17) (18 - 20)  SpO2: 100% (02 Jan 2020 13:17) (95% - 100%)    PHYSICAL EXAM-  GENERAL: awake and alert and making needs known  HEAD:  Atraumatic, Normocephalic  EYES: , conjunctiva and sclera clear, right sided blind/white eye  ENT: Moist mucous membranes,  No lesions  NECK: Supple, No JVD, Normal thyroid  NERVOUS SYSTEM:  AAO x 2, unable to comprehend her medical condition, moving her UE   CHEST/LUNG: Diminished but CTA bilaterally; No rales, rhonchi, wheezing, or rubs  HEART: Regular rate and rhythm; No murmurs, rubs, or gallops  ABDOMEN: Soft, Nontender, Nondistended; Bowel sounds present, Large Sacral Decub ulcer unstageable s/p debridement  x 2  EXTREMITIES:  2+ Peripheral Pulses, No clubbing, cyanosis, or edema    MEDICATIONS  (STANDING):  albuterol/ipratropium for Nebulization. 3 milliLiter(s) Nebulizer every 6 hours  chlorhexidine 4% Liquid 1 Application(s) Topical <User Schedule>  collagenase Ointment 1 Application(s) Topical daily  dextrose 5%. 1000 milliLiter(s) (50 mL/Hr) IV Continuous <Continuous>  dextrose 50% Injectable 12.5 Gram(s) IV Push once  dextrose 50% Injectable 25 Gram(s) IV Push once  dextrose 50% Injectable 25 Gram(s) IV Push once  epoetin sara Injectable [id number] Unit(s) IV Push <User Schedule>  insulin lispro (HumaLOG) corrective regimen sliding scale   SubCutaneous every 6 hours  lactulose Retention Enema 200 Gram(s) Rectal two times a day  pantoprazole  Injectable 40 milliGRAM(s) IV Push two times a day    MEDICATIONS  (PRN):  acetaminophen  Suppository .. 650 milliGRAM(s) Rectal every 6 hours PRN Temp greater or equal to 38C (100.4F), Mild Pain (1 - 3)  dextrose 40% Gel 15 Gram(s) Oral once PRN Blood Glucose LESS THAN 70 milliGRAM(s)/deciLiter  glucagon  Injectable 1 milliGRAM(s) IntraMuscular once PRN Glucose <70 milliGRAM(s)/deciLiter  morphine  - Injectable 1 milliGRAM(s) SubCutaneous every 4 hours PRN Moderate Pain (4 - 6)      LABS:                        8.8    10.82 )-----------( 158      ( 01 Jan 2020 04:51 )             26.9     01-02    138  |  100  |  37.0<H>  ----------------------------<  122<H>  4.4   |  26.0  |  3.70<H>    Ca    8.1<L>      02 Jan 2020 09:39  Phos  5.1     01-02    TPro  x   /  Alb  2.2<L>  /  TBili  x   /  DBili  x   /  AST  x   /  ALT  x   /  AlkPhos  x   01-02

## 2020-01-02 NOTE — PROGRESS NOTE ADULT - SUBJECTIVE AND OBJECTIVE BOX
[facility] DIVISION OF KIDNEY DISEASES AND HYPERTENSION -- FOLLOW UP NOTE  --------------------------------------------------------------------------------  Chief Complaint: CKD on HD    24 hour events/subjective:  Pt seen and examined; awake and alert  No acute complaint      PAST HISTORY  --------------------------------------------------------------------------------  No significant changes to PMH, PSH, FHx, SHx, unless otherwise noted    ALLERGIES & MEDICATIONS  --------------------------------------------------------------------------------  Allergies    ACE inhibitors (Other (Mild))    Intolerances    oxycodone (Sedation/Somnol)    Standing Inpatient Medications  albuterol/ipratropium for Nebulization. 3 milliLiter(s) Nebulizer every 6 hours  chlorhexidine 4% Liquid 1 Application(s) Topical <User Schedule>  collagenase Ointment 1 Application(s) Topical daily  dextrose 5%. 1000 milliLiter(s) IV Continuous <Continuous>  dextrose 50% Injectable 12.5 Gram(s) IV Push once  dextrose 50% Injectable 25 Gram(s) IV Push once  dextrose 50% Injectable 25 Gram(s) IV Push once  epoetin sara Injectable [id number] Unit(s) IV Push <User Schedule>  insulin lispro (HumaLOG) corrective regimen sliding scale   SubCutaneous every 6 hours  lactulose Retention Enema 200 Gram(s) Rectal two times a day  pantoprazole  Injectable 40 milliGRAM(s) IV Push two times a day    PRN Inpatient Medications  acetaminophen  Suppository .. 650 milliGRAM(s) Rectal every 6 hours PRN  dextrose 40% Gel 15 Gram(s) Oral once PRN  glucagon  Injectable 1 milliGRAM(s) IntraMuscular once PRN  morphine  - Injectable 1 milliGRAM(s) SubCutaneous every 4 hours PRN      REVIEW OF SYSTEMS  --------------------------------------------------------------------------------  Gen: No weight changes, fatigue, fevers/chills  Skin: No rashes  Head/Eyes/Ears/Mouth: No headache; Normal hearing; Normal vision w/o blurriness; No sinus pain/discomfort,   Respiratory: No dyspnea, cough, wheezing, hemoptysis  CV: No chest pain, PND, orthopnea  GI: No abdominal pain, diarrhea, constipation, nausea, vomiting  : No increased frequency, dysuria, hematuria, nocturia  MSK: No joint pain/swelling; no back pain; no edema  Neuro: No dizziness/lightheadedness, weakness  Heme: No easy bruising or bleeding  Endo: No heat/cold intolerance  Psych: No significant nervousness, anxiety,    All other systems were reviewed and are negative, except as noted.    VITALS/PHYSICAL EXAM  --------------------------------------------------------------------------------  T(C): 36.4 (01-02-20 @ 08:00), Max: 36.6 (01-01-20 @ 13:43)  HR: 82 (01-02-20 @ 08:59) (68 - 86)  BP: 125/77 (01-02-20 @ 08:00) (115/65 - 131/66)  RR: 18 (01-02-20 @ 08:00) (18 - 20)  SpO2: 98% (01-02-20 @ 08:00) (95% - 100%)  Wt(kg): --        Physical Exam:  	Gen: NAD,  	HEENT: supple neck  	Pulm: CTA B/L  	CV: RRR, S1S2; no rub  	Back: No spinal or CVA tenderness  	Abd: +BS, soft, nontender/nondistended  	: No suprapubic tenderness  	UE: Warm,  no edema  	LE: Warm, no edema  	Neuro: No focal deficits  	Psych: Normal affect and mood  	Skin: Warm      LABS/STUDIES  --------------------------------------------------------------------------------              8.8    10.82 >-----------<  158      [01-01-20 @ 04:51]              26.9     138  |  100  |  37.0  ----------------------------<  122      [01-02-20 @ 09:39]  4.4   |  26.0  |  3.70        Ca     8.1     [01-02-20 @ 09:39]      Phos  5.1     [01-02-20 @ 09:39]    TPro  x   /  Alb  2.2  /  TBili  x   /  DBili  x   /  AST  x   /  ALT  x   /  AlkPhos  x   [01-02-20 @ 09:39]    Creatinine Trend:  SCr 3.70 [01-02 @ 09:39]  SCr 3.31 [01-01 @ 04:51]  SCr 2.68 [12-31 @ 05:13]  SCr 2.41 [12-30 @ 13:52]  SCr 2.65 [12-29 @ 06:42]    Urinalysis - [12-26-19 @ 02:27]      Color Red / Appearance Cloudy / SG 1.020 / pH 6.0      Gluc Negative / Ketone Trace  / Bili Negative / Urobili Negative       Blood Large / Protein 500 / Leuk Est Moderate / Nitrite Negative      RBC >50 / WBC 6-10 / Hyaline  / Gran  / Sq Epi  / Non Sq Epi Occasional / Bacteria Few      Iron 25, TIBC 154, %sat 16      [01-02-20 @ 09:39]  Ferritin 668      [01-02-20 @ 09:39]  PTH -- (Ca 9.1)      [05-30-19 @ 18:09]   107  PTH -- (Ca 7.9)      [01-17-19 @ 16:26]   175  PTH -- (Ca 8.1)      [01-16-19 @ 16:55]   148  Vitamin D (25OH) 31.1      [05-30-19 @ 18:17]  HbA1c 5.8      [12-26-19 @ 09:04]  TSH 1.10      [05-17-19 @ 12:21]    HBsAb 18.0      [12-26-19 @ 22:01]  HBsAg Nonreact      [12-26-19 @ 22:01]  HBcAb Nonreact      [12-26-19 @ 22:01]  HCV 0.20, Nonreact      [12-26-19 @ 22:01]

## 2020-01-02 NOTE — PROGRESS NOTE ADULT - ASSESSMENT
Assessment:  64yoF with hypovolemic shock for possible upper GI bleed, hypoxemia, blood loss anemia, metabolic acidosis, UTI, KEITH on CKD with an un stageable sacral ulcer - s/p bedside debridements.    Plan:  -Continue to monitor  -Pain control as needed  -Plan for OR 1/3/19 for debridement  -Will continue daily dressing changes in preparation for OR Assessment:  64yoF with hypovolemic shock for possible upper GI bleed, hypoxemia, blood loss anemia, metabolic acidosis, UTI, KEITH on CKD with an un stageable sacral ulcer - s/p bedside debridements.    Plan:  -Per primary team, Dr. Cronin, family is refusing spinal anes and general anes due to prior complications.   -Surgery will be signing off, please don't hesitate to reach out with additional questions  -Dressing change instructions: Daily dressing change of sacral wound with santyl over wound. Apply santyl to gauze, leave it over sacrum. Apply alevyn padding over.

## 2020-01-02 NOTE — PROGRESS NOTE ADULT - ASSESSMENT
65 yo female with hx of CHFpEF, CAD, CKD, HTN, DM, cirrhosis, portal gastropathy, recurrent GI bleed, blind, sacral decub ulcer, presented to the ED from nursing home with encephalopathy, hypoxic resp failure requiring bipap, in ED found to have anemia with Hb 5 that responded to blood transfusions (hx of recurrent GI bleed for which multiple EGDs/colonoscopies have been performed, no further invasive work up as per GI), sepsis secondary to PNA on abx, KEITH on CKD now requiring HD via left IJ.     10. Unstageable Sacral Decub  s/p debridement bedside x 2   Surg plans debridement in OR  However family has their concerns about GA and are refusing same for now  Even when Spinal anesthesia was offered today family adamantly refused OR procedure  Surgery signed off    11. Patient lacks medical decision making capacity  family finally agrees with this and they are willing to make the decision  But HCP 1 dgtr and HCP 2 pt's sister stated that they will get together this weekend and make end of life, artifical feeds decisions    12. Oropharyngeal Dysphagia  Currently made NPO and meds changed to other routes  Family wants aggressive suctioning of throat and they want to see if that improves her swallowing capacity  Freq suctioning ordered  SInce it will be day 3 tomorrow as NPO, plan to start NGT feeds tomorrow  Family agreeable with this this  They state that they will make decision over weekend for PEG tube or not    1. S/p Metabolic vs Uremic vs Hepatic Encephalopathy  Likely multifactorial - Hepatic, Uremic. Uremia has resolved. Hepatic- responds well to lactulose  S/ S eval- NPO  CT head negative, EEG without seizures      2. Acute on chronic diastolic CHF/ Acute hypoxic respiratory failure  resolving well  Sec to pulm edema  No longer requiring bipap and now weaned off Hi yazmin O2 and tolerating nasal cannula  Continue to taper supplemental NC O2 as able    3. Acute blood loss Anemia   Hx of recurrent GI bleed? prior EGDs/ colonoscopies this year which were negative, prior CTA with +transverse colon bleed  Seen by GI on this admission, no invasive intervention   Responded to blood transfusions, last transfusion 12/28   No signs of active bleeding at this time.    Protonix, octreotide- total 5 days through 12/30/19 completed  Avoid chemical DVT PPE for now     4. Known CASTA[place]EDA Cirrhosis with portal gastropathy and AVMs  Lactulose and rifaximin  Octreotide, PPI    5. KEITH/ATN on CKD  Likely sec to blood loss  Was briefly on dialysis in the past  Renal US showed no hydronephrosis  Seen in consult by nephrology, now again on HD per nephrology via left IJ  No HD today as per nephro, will plan for HD Thursday   Discussed w Dr Castro - pt will likely need long term HD, eventual plan for PermCath / AVF will consult vascular team today   Discussed with vascular, vein mapping and tentative plan for permacath thursday, holding off on AVF planning for now    6. Sepsis  Secondary to PNA, b/l infiltrates on CT R>L  (previously treated with zosyn at NH)  Abx broadened to merrem x 5 days, last day 1/1/20. Completed  Ucx negative- Blood cx negative   Unstageable sacral decub debrided x 2 by surgical team,  Cont wound care  ID consulted for any further antibiotic recs     7.  DM2  Well controlled  Cont current regimen    8. s/p Hypotension  Avoid BP meds  Midodrine for now     9. Lung Nodule  Incidental finding on CT  Will need outpatient follow up with pulm for repeat imaging in 6 months     ICDs    D/W Family and HCP- they want to discuss as family over weekend. they want freq suctioning of throat to be done and they hope this will improve her swallow capacity. they are accepting of starting NGT feeds for nutrition while they decide about PEG.

## 2020-01-02 NOTE — PROGRESS NOTE ADULT - ASSESSMENT
1) KEITH on CKD now likely ESRD  2) encephalopathy- improved  4) Acute blood loss anemia superimposed on anemia of CKD  5) Hypotension      HD today  Continue Midodrine   Continue QUENTIN with HD  Monitor lytes, BP and UOP  Vascular for tunneled HD catheter  Vein mapping for AVF  Will follow

## 2020-01-03 LAB
ANION GAP SERPL CALC-SCNC: 17 MMOL/L — SIGNIFICANT CHANGE UP (ref 5–17)
APTT BLD: 31.2 SEC — SIGNIFICANT CHANGE UP (ref 27.5–36.3)
BASOPHILS # BLD AUTO: 0.01 K/UL — SIGNIFICANT CHANGE UP (ref 0–0.2)
BASOPHILS NFR BLD AUTO: 0.1 % — SIGNIFICANT CHANGE UP (ref 0–2)
BLD GP AB SCN SERPL QL: SIGNIFICANT CHANGE UP
BUN SERPL-MCNC: 21 MG/DL — HIGH (ref 8–20)
CALCIUM SERPL-MCNC: 7.8 MG/DL — LOW (ref 8.6–10.2)
CHLORIDE SERPL-SCNC: 99 MMOL/L — SIGNIFICANT CHANGE UP (ref 98–107)
CO2 SERPL-SCNC: 24 MMOL/L — SIGNIFICANT CHANGE UP (ref 22–29)
CREAT SERPL-MCNC: 2.29 MG/DL — HIGH (ref 0.5–1.3)
EOSINOPHIL # BLD AUTO: 0.04 K/UL — SIGNIFICANT CHANGE UP (ref 0–0.5)
EOSINOPHIL NFR BLD AUTO: 0.4 % — SIGNIFICANT CHANGE UP (ref 0–6)
GLUCOSE BLDC GLUCOMTR-MCNC: 111 MG/DL — HIGH (ref 70–99)
GLUCOSE BLDC GLUCOMTR-MCNC: 117 MG/DL — HIGH (ref 70–99)
GLUCOSE BLDC GLUCOMTR-MCNC: 124 MG/DL — HIGH (ref 70–99)
GLUCOSE BLDC GLUCOMTR-MCNC: 128 MG/DL — HIGH (ref 70–99)
GLUCOSE BLDC GLUCOMTR-MCNC: 129 MG/DL — HIGH (ref 70–99)
GLUCOSE SERPL-MCNC: 113 MG/DL — SIGNIFICANT CHANGE UP (ref 70–115)
HCT VFR BLD CALC: 26.6 % — LOW (ref 34.5–45)
HGB BLD-MCNC: 8.7 G/DL — LOW (ref 11.5–15.5)
IMM GRANULOCYTES NFR BLD AUTO: 0.6 % — SIGNIFICANT CHANGE UP (ref 0–1.5)
INR BLD: 1.44 RATIO — HIGH (ref 0.88–1.16)
LYMPHOCYTES # BLD AUTO: 0.75 K/UL — LOW (ref 1–3.3)
LYMPHOCYTES # BLD AUTO: 7.7 % — LOW (ref 13–44)
MAGNESIUM SERPL-MCNC: 1.9 MG/DL — SIGNIFICANT CHANGE UP (ref 1.6–2.6)
MCHC RBC-ENTMCNC: 28.1 PG — SIGNIFICANT CHANGE UP (ref 27–34)
MCHC RBC-ENTMCNC: 32.7 GM/DL — SIGNIFICANT CHANGE UP (ref 32–36)
MCV RBC AUTO: 85.8 FL — SIGNIFICANT CHANGE UP (ref 80–100)
MONOCYTES # BLD AUTO: 0.45 K/UL — SIGNIFICANT CHANGE UP (ref 0–0.9)
MONOCYTES NFR BLD AUTO: 4.6 % — SIGNIFICANT CHANGE UP (ref 2–14)
NEUTROPHILS # BLD AUTO: 8.46 K/UL — HIGH (ref 1.8–7.4)
NEUTROPHILS NFR BLD AUTO: 86.6 % — HIGH (ref 43–77)
PHOSPHATE SERPL-MCNC: 3.7 MG/DL — SIGNIFICANT CHANGE UP (ref 2.4–4.7)
PLATELET # BLD AUTO: 178 K/UL — SIGNIFICANT CHANGE UP (ref 150–400)
POTASSIUM SERPL-MCNC: 3.7 MMOL/L — SIGNIFICANT CHANGE UP (ref 3.5–5.3)
POTASSIUM SERPL-SCNC: 3.7 MMOL/L — SIGNIFICANT CHANGE UP (ref 3.5–5.3)
PROTHROM AB SERPL-ACNC: 16.7 SEC — HIGH (ref 10–12.9)
RBC # BLD: 3.1 M/UL — LOW (ref 3.8–5.2)
RBC # FLD: 19.9 % — HIGH (ref 10.3–14.5)
SODIUM SERPL-SCNC: 140 MMOL/L — SIGNIFICANT CHANGE UP (ref 135–145)
WBC # BLD: 9.77 K/UL — SIGNIFICANT CHANGE UP (ref 3.8–10.5)
WBC # FLD AUTO: 9.77 K/UL — SIGNIFICANT CHANGE UP (ref 3.8–10.5)

## 2020-01-03 PROCEDURE — 36558 INSERT TUNNELED CV CATH: CPT | Mod: RT

## 2020-01-03 PROCEDURE — 99233 SBSQ HOSP IP/OBS HIGH 50: CPT

## 2020-01-03 PROCEDURE — 99232 SBSQ HOSP IP/OBS MODERATE 35: CPT

## 2020-01-03 PROCEDURE — 76937 US GUIDE VASCULAR ACCESS: CPT | Mod: 26

## 2020-01-03 PROCEDURE — 71045 X-RAY EXAM CHEST 1 VIEW: CPT | Mod: 26

## 2020-01-03 RX ORDER — CHLORHEXIDINE GLUCONATE 213 G/1000ML
1 SOLUTION TOPICAL
Refills: 0 | Status: DISCONTINUED | OUTPATIENT
Start: 2020-01-03 | End: 2020-01-03

## 2020-01-03 RX ORDER — SODIUM CHLORIDE 9 MG/ML
10 INJECTION INTRAMUSCULAR; INTRAVENOUS; SUBCUTANEOUS
Refills: 0 | Status: DISCONTINUED | OUTPATIENT
Start: 2020-01-03 | End: 2020-01-23

## 2020-01-03 RX ORDER — CHLORHEXIDINE GLUCONATE 213 G/1000ML
1 SOLUTION TOPICAL
Refills: 0 | Status: DISCONTINUED | OUTPATIENT
Start: 2020-01-03 | End: 2020-01-23

## 2020-01-03 RX ADMIN — Medication 1 APPLICATION(S): at 06:01

## 2020-01-03 RX ADMIN — Medication 3 MILLILITER(S): at 20:13

## 2020-01-03 RX ADMIN — CHLORHEXIDINE GLUCONATE 1 APPLICATION(S): 213 SOLUTION TOPICAL at 17:17

## 2020-01-03 RX ADMIN — LACTULOSE 200 GRAM(S): 10 SOLUTION ORAL at 21:14

## 2020-01-03 RX ADMIN — LACTULOSE 200 GRAM(S): 10 SOLUTION ORAL at 11:54

## 2020-01-03 RX ADMIN — Medication 3 MILLILITER(S): at 15:20

## 2020-01-03 RX ADMIN — PANTOPRAZOLE SODIUM 40 MILLIGRAM(S): 20 TABLET, DELAYED RELEASE ORAL at 06:00

## 2020-01-03 RX ADMIN — Medication 3 MILLILITER(S): at 04:09

## 2020-01-03 RX ADMIN — PANTOPRAZOLE SODIUM 40 MILLIGRAM(S): 20 TABLET, DELAYED RELEASE ORAL at 17:17

## 2020-01-03 NOTE — PROGRESS NOTE ADULT - SUBJECTIVE AND OBJECTIVE BOX
Patient is a 64y old  Female who presents with a chief complaint of Acute hypoxemic respiratory failure, GIB (2020 16:46)  KEITH on CKD requiring RRT  NPO p MN for permcath today  Pt offers no complaints    PAST MEDICAL & SURGICAL HISTORY:  Legally blind in right eye, as defined in [place]  CAD in native artery  Chronic CHF  DM (diabetes mellitus), type 2  GERD (gastroesophageal reflux disease)  Hyperlipidemia  HTN (hypertension)  Congestive heart failure, unspecified HF chronicity, unspecified heart failure type  Insomnia  Retinal detachment  CAD (coronary artery disease)  Glaucoma  Urinary retention  Peripheral neuropathy  GERD (gastroesophageal reflux disease)  Depression  Diabetes  Hypertension, unspecified type  Herniated disc, cervical  PAD (peripheral artery disease)  Legally blind  History of peripheral vascular disease  History of retinal detachment  History of CEA (carotid endarterectomy): Right  Hyperlipidemia  Glaucoma  Hypertension  Diabetes  H/O carotid endarterectomy  S/P CABG (coronary artery bypass graft)  No significant past surgical history  S/P CABG x 1  After cataract  Uveitic glaucoma of both eyes  Detached retina  Atherosclerosis of right carotid artery   delivery delivered      Allergies  ACE inhibitors (Other (Mild))    Intolerances  oxycodone (Sedation/Somnol)      Vital Signs Last 24 Hrs  T(C): 36.8 (2020 07:42), Max: 36.8 (2020 07:42)  T(F): 98.2 (2020 07:42), Max: 98.2 (2020 07:42)  HR: 88 (2020 07:42) (80 - 96)  BP: 122/56 (2020 07:42) (101/58 - 128/54)  BP(mean): --  RR: 20 (2020 07:42) (18 - 20)  SpO2: 96% (2020 07:42) (96% - 100%)  I&O's Detail    2020 07:01  -  2020 07:00  --------------------------------------------------------  IN:  Total IN: 0 mL    OUT:    Other: 1600 mL  Total OUT: 1600 mL    Total NET: -1600 mL          Physical Exam:  General: NAD, resting comfortably in bed  R STEPHENIE Armas in place. Dressing CDI      LABS:        138  |  100  |  37.0<H>  ----------------------------<  122<H>  4.4   |  26.0  |  3.70<H>    Ca    8.1<L>      2020 09:39  Phos  5.1         TPro  x   /  Alb  2.2<L>  /  TBili  x   /  DBili  x   /  AST  x   /  ALT  x   /  AlkPhos  x         CAPILLARY BLOOD GLUCOSE  POCT Blood Glucose.: 117 mg/dL (2020 05:56)  POCT Blood Glucose.: 111 mg/dL (2020 00:47)  POCT Blood Glucose.: 108 mg/dL (2020 18:57)  POCT Blood Glucose.: 144 mg/dL (2020 13:01)    Radiology and Additional Studies:    Assessment and Plan: 64y Female Gastrointestinal hemorrhage  KEITH on CKD now requiring RRT  Permcath today

## 2020-01-03 NOTE — PROGRESS NOTE ADULT - SUBJECTIVE AND OBJECTIVE BOX
Patient now sp permacath by  Dr Argueta  Patient stable     POC as per hospitalist  Permacath access as per vascular

## 2020-01-03 NOTE — PROGRESS NOTE ADULT - ASSESSMENT
65 yo female with hx of CHFpEF, CAD, CKD, HTN, DM, cirrhosis, portal gastropathy, recurrent GI bleed, blind, sacral decub ulcer, presented to the ED from nursing home with encephalopathy, hypoxic resp failure requiring bipap, in ED found to have anemia with Hb 5 that responded to blood transfusions (hx of recurrent GI bleed for which multiple EGDs/colonoscopies have been performed, no further invasive work up as per GI), sepsis secondary to PNA on abx, KEITH on CKD now requiring HD via left IJ.     # Unstageable Sacral Decub  s/p debridement bedside x 2   Surg plans debridement in OR  However family has their concerns about GA and are refusing same for now  Even when Spinal anesthesia was offered today family adamantly refused OR procedure  Surgery signed off    # Patient lacks medical decision making capacity  family finally agrees with this and they are willing to make the decision  But HCP 1 dgtr and HCP 2 pt's sister stated that they will get together this weekend and make end of life, artifical feeds decisions    # Oropharyngeal Dysphagia  Currently made NPO and meds changed to other routes  Family wants aggressive suctioning of throat and they want to see if that improves her swallowing capacity  Freq suctioning ordered  SInce it will be day 3 on 1/3/20 as NPO, plan to start NGT feeds today  Family agreeable with this   They state that they will make decision over weekend for PEG tube or not    # S/p Metabolic vs Uremic vs Hepatic Encephalopathy  Likely multifactorial - Hepatic, Uremic. Uremia has resolved. Hepatic- responds well to lactulose  S/ S eval- NPO  CT head negative, EEG without seizures      # Acute on chronic diastolic CHF/ Acute hypoxic respiratory failure  resolving well  Sec to pulm edema  No longer requiring bipap and now weaned off Hi yazmin O2 and tolerating nasal cannula  Continue to taper supplemental NC O2 as able    # Acute blood loss Anemia   Hx of recurrent GI bleed? prior EGDs/ colonoscopies this year which were negative, prior CTA with +transverse colon bleed  Seen by GI on this admission, no invasive intervention   Responded to blood transfusions, last transfusion 12/28   No signs of active bleeding at this time.    Protonix, octreotide- total 5 days through 12/30/19 completed  Avoid chemical DVT PPE for now     # Known CASTAÑEDA Cirrhosis with portal gastropathy and AVMs  Lactulose and rifaximin  Octreotide, PPI    # KEITH/ATN on CKD  Likely sec to blood loss  Was briefly on dialysis in the past  Renal US showed no hydronephrosis  Seen in consult by nephrology, now again on HD per nephrology via left IJ  No HD today as per nephro, will plan for HD Thursday   Discussed w Dr Castro - pt will likely need long term HD, eventual plan for PermCath / AVF will consult vascular team today   Discussed with vascular, vein mapping and tentative plan for permacath thursday, holding off on AVF planning for now    # Sepsis  Secondary to PNA, b/l infiltrates on CT R>L  (previously treated with zosyn at NH)  Abx broadened to merrem x 5 days, last day 1/1/20. Completed  Ucx negative- Blood cx negative   Unstageable sacral decub debrided x 2 by surgical team,  Cont wound care  ID consulted for any further antibiotic recs     # DM2  Well controlled  Cont current regimen    # s/p Hypotension  Avoid BP meds  Midodrine for now     # Lung Nodule  Incidental finding on CT  Will need outpatient follow up with pulm for repeat imaging in 6 months     ICDs    D/W Family and HCP- they want to discuss as family over weekend. they want freq suctioning of throat to be done and they hope this will improve her swallow capacity. they are accepting of starting NGT feeds for nutrition while they decide about PEG. 65 yo female with hx of CHFpEF, CAD, CKD, HTN, DM, cirrhosis, portal gastropathy, recurrent GI bleed, blind, sacral decub ulcer, presented to the ED from nursing home with encephalopathy, hypoxic resp failure requiring bipap, in ED found to have anemia with Hb 5 that responded to blood transfusions (hx of recurrent GI bleed for which multiple EGDs/colonoscopies have been performed, no further invasive work up as per GI), sepsis secondary to PNA on abx, KEITH on CKD now requiring HD via left IJ. tunnelled cath palced for access on 1/3/20. PT failed s/s and is NPO. starting NGT feeds today. and family to make decisions over the weekend. multiple family conversations ongoing    # Unstageable Sacral Decub  s/p debridement bedside x 2   Surg plans debridement in OR  However family has their concerns about GA and are refusing same for now  Even when Spinal anesthesia was offered today family adamantly refused OR procedure  Surgery signed off    # Patient lacks medical decision making capacity  family finally agrees with this and they are willing to make the decision  But HCP 1 dgtr and HCP 2 pt's sister stated that they will get together this weekend and make end of life, artifical feeds decisions    # Oropharyngeal Dysphagia  Currently made NPO and meds changed to other routes  Family wants aggressive suctioning of throat and they want to see if that improves her swallowing capacity  Freq suctioning ordered  SInce it will be day 3 on 1/3/20 as NPO, plan to start NGT feeds today  Family agreeable with this   They state that they will make decision over weekend for PEG tube or not    # S/p Metabolic vs Uremic vs Hepatic Encephalopathy  Likely multifactorial - Hepatic, Uremic. Uremia has resolved. Hepatic- responds well to lactulose  S/ S eval- NPO  CT head negative, EEG without seizures      # Acute on chronic diastolic CHF/ Acute hypoxic respiratory failure  resolving well  Sec to pulm edema  No longer requiring bipap and now weaned off Hi yazmin O2 and tolerating nasal cannula  Continue to taper supplemental NC O2 as able    # Acute blood loss Anemia   Hx of recurrent GI bleed? prior EGDs/ colonoscopies this year which were negative, prior CTA with +transverse colon bleed  Seen by GI on this admission, no invasive intervention   Responded to blood transfusions, last transfusion 12/28   No signs of active bleeding at this time.    Protonix, octreotide- total 5 days through 12/30/19 completed  Avoid chemical DVT PPE for now     # Known CASTAÑEDA Cirrhosis with portal gastropathy and AVMs  Lactulose and rifaximin  Octreotide, PPI    # KEITH/ATN on CKD  Likely sec to blood loss  Was briefly on dialysis in the past  Renal US showed no hydronephrosis  Seen in consult by nephrology, now again on HD per nephrology via left IJ  No HD today as per nephro, will plan for HD Thursday   Discussed w Dr Castro - pt will likely need long term HD, eventual plan for PermCath / AVF will consult vascular team today   Discussed with vascular, vein mapping and tentative plan for permacath thursday, holding off on AVF planning for now  s/p tunnelled cathter placement by Vas con 1/3/20    # Sepsis  Secondary to PNA, b/l infiltrates on CT R>L  (previously treated with zosyn at NH)  Abx broadened to merrem x 5 days, last day 1/1/20. Completed  Ucx negative- Blood cx negative   Unstageable sacral decub debrided x 2 by surgical team,  Cont wound care  ID consulted for any further antibiotic recs     # DM2  Well controlled  Cont current regimen    # s/p Hypotension  Avoid BP meds  Midodrine for now     # Lung Nodule  Incidental finding on CT  Will need outpatient follow up with pulm for repeat imaging in 6 months     ICDs    D/W Family and HCP- they want to discuss as family over weekend. they want freq suctioning of throat to be done and they hope this will improve her swallow capacity. they are accepting of starting NGT feeds for nutrition while they decide about PEG.

## 2020-01-03 NOTE — PROCEDURE NOTE - NSPOSTPRCRAD_GEN_A_CORE
chest radiograph post-procedure radiography performed/chest radiograph/feeding tube in correct gastric position

## 2020-01-03 NOTE — PROGRESS NOTE ADULT - ASSESSMENT
1) KEITH on CKD now likely ESRD  2) encephalopathy- improved  4) Acute blood loss anemia superimposed on anemia of CKD  5) Hypotension      HD tommorow  Continue Midodrine   Continue QUENTIN with HD  Monitor lytes, BP and UOP  Vein mapping for AVF for long term HD if consistent with GOC  Will follow 1) KEITH on CKD now likely ESRD  2) encephalopathy- improved  4) Acute blood loss anemia superimposed on anemia of CKD  5) Hypotension      HD tommorow  Continue Midodrine prior to HD  Continue QUENTIN with HD  Monitor lytes, BP and UOP  Vein mapping for AVF for long term HD if consistent with GOC  Will follow

## 2020-01-03 NOTE — PROGRESS NOTE ADULT - SUBJECTIVE AND OBJECTIVE BOX
[facility] DIVISION OF KIDNEY DISEASES AND HYPERTENSION -- HEMODIALYSIS NOTE  --------------------------------------------------------------------------------  Chief Complaint: ESRD/Ongoing hemodialysis requirement    24 hour events/subjective:  s/p HD yesterday with 1.6 L UF  Had HD tunneled catheter placement this AM        PAST HISTORY  --------------------------------------------------------------------------------  No significant changes to PMH, PSH, FHx, SHx, unless otherwise noted    ALLERGIES & MEDICATIONS  --------------------------------------------------------------------------------  Allergies    ACE inhibitors (Other (Mild))    Intolerances    oxycodone (Sedation/Somnol)    Standing Inpatient Medications  albuterol/ipratropium for Nebulization. 3 milliLiter(s) Nebulizer every 6 hours  chlorhexidine 2% Cloths 1 Application(s) Topical <User Schedule>  chlorhexidine 4% Liquid 1 Application(s) Topical <User Schedule>  collagenase Ointment 1 Application(s) Topical daily  dextrose 5%. 1000 milliLiter(s) IV Continuous <Continuous>  dextrose 50% Injectable 12.5 Gram(s) IV Push once  dextrose 50% Injectable 25 Gram(s) IV Push once  dextrose 50% Injectable 25 Gram(s) IV Push once  epoetin sara Injectable 64762 Unit(s) IV Push <User Schedule>  insulin lispro (HumaLOG) corrective regimen sliding scale   SubCutaneous every 6 hours  lactulose Retention Enema 200 Gram(s) Rectal two times a day  pantoprazole  Injectable 40 milliGRAM(s) IV Push two times a day    PRN Inpatient Medications  acetaminophen  Suppository .. 650 milliGRAM(s) Rectal every 6 hours PRN  dextrose 40% Gel 15 Gram(s) Oral once PRN  glucagon  Injectable 1 milliGRAM(s) IntraMuscular once PRN  morphine  - Injectable 1 milliGRAM(s) SubCutaneous every 4 hours PRN  sodium chloride 0.9% lock flush 10 milliLiter(s) IV Push every 1 hour PRN      REVIEW OF SYSTEMS  --------------------------------------------------------------------------------  Gen: No weight changes, fatigue, fevers/chills, weakness  Skin: No rashes  Head/Eyes/Ears/Mouth: No headache  Respiratory: No dyspnea, cough,  CV: No chest pain, orthopnea  GI: No abdominal pain, diarrhea, constipation, nausea, vomiting,  MSK: No joint pain  Neuro: No dizziness/lightheadedness, weakness  Heme: No bleeding  Psych: No significant nervousness, anxiety, stress, depression    All other systems were reviewed and are negative, except as noted.    VITALS/PHYSICAL EXAM  --------------------------------------------------------------------------------  T(C): 36.8 (01-03-20 @ 07:42), Max: 36.8 (01-03-20 @ 07:42)  HR: 86 (01-03-20 @ 09:56) (80 - 96)  BP: 127/60 (01-03-20 @ 09:56) (101/58 - 131/62)  RR: 10 (01-03-20 @ 09:56) (10 - 20)  SpO2: 97% (01-03-20 @ 09:56) (96% - 99%)  Wt(kg): --        01-02-20 @ 07:01  -  01-03-20 @ 07:00  --------------------------------------------------------  IN: 0 mL / OUT: 1600 mL / NET: -1600 mL      Physical Exam:  	Gen: NAD  	HEENT: supple neck  	Pulm: CTA B/L  	CV: RRR, S1S2; no rub  	Abd: +BS, soft, nontender  	UE: Warm, intact strength  	LE: Warm, no edema  	Neuro: No focal deficits  	Psych: slow speech  	Skin: Warm,  	Vascular access: RIJ tunneled HD catheter    LABS/STUDIES  --------------------------------------------------------------------------------              8.7    9.77  >-----------<  178      [01-03-20 @ 07:59]              26.6     140  |  99  |  21.0  ----------------------------<  113      [01-03-20 @ 07:59]  3.7   |  24.0  |  2.29        Ca     7.8     [01-03-20 @ 07:59]      Mg     1.9     [01-03-20 @ 07:59]      Phos  3.7     [01-03-20 @ 07:59]    TPro  x   /  Alb  2.2  /  TBili  x   /  DBili  x   /  AST  x   /  ALT  x   /  AlkPhos  x   [01-02-20 @ 09:39]    PT/INR: PT 16.7 , INR 1.44       [01-03-20 @ 07:59]  PTT: 31.2       [01-03-20 @ 07:59]      Iron 25, TIBC 154, %sat 16      [01-02-20 @ 09:39]  Ferritin 668      [01-02-20 @ 09:39]  PTH -- (Ca 8.0)      [01-02-20 @ 17:06]   119  PTH -- (Ca 9.1)      [05-30-19 @ 18:09]   107  PTH -- (Ca 7.9)      [01-17-19 @ 16:26]   175  PTH -- (Ca 8.1)      [01-16-19 @ 16:55]   148  Vitamin D (25OH) 25.2      [01-02-20 @ 17:06]  HbA1c 5.8      [12-26-19 @ 09:04]  TSH 1.10      [05-17-19 @ 12:21]    HBsAb 18.0      [12-26-19 @ 22:01]  HBsAg Nonreact      [12-26-19 @ 22:01]  HBcAb Nonreact      [12-26-19 @ 22:01]  HCV 0.20, Nonreact      [12-26-19 @ 22:01]

## 2020-01-03 NOTE — PROGRESS NOTE ADULT - SUBJECTIVE AND OBJECTIVE BOX
HEALTH ISSUES - PROBLEM Dx:    lethargy, large decub, oropharyngeal dysphagia    INTERVAL HPI/ OVERNIGHT EVENTS:    Pt is awake and alert and makes her needs known clearly  But she is not capable of making medical decisions for herself  Family finally agrees with this    REVIEW OF SYSTEMS:    c/o decub pain     Vital Signs Last 24 Hrs  T(C): 36.8 (03 Jan 2020 07:42), Max: 36.8 (03 Jan 2020 07:42)  T(F): 98.2 (03 Jan 2020 07:42), Max: 98.2 (03 Jan 2020 07:42)  HR: 86 (03 Jan 2020 09:56) (80 - 96)  BP: 127/60 (03 Jan 2020 09:56) (101/58 - 131/62)  BP(mean): --  RR: 10 (03 Jan 2020 09:56) (10 - 20)  SpO2: 97% (03 Jan 2020 09:56) (96% - 99%)    PHYSICAL EXAM-  GENERAL: awake and alert and making needs known  HEAD:  Atraumatic, Normocephalic  EYES: , conjunctiva and sclera clear, right sided blind/white eye  ENT: Moist mucous membranes,  No lesions  NECK: Supple, No JVD, Normal thyroid  NERVOUS SYSTEM:  AAO x 2, unable to comprehend her medical condition, moving her UE   CHEST/LUNG: Diminished but CTA bilaterally; No rales, rhonchi, wheezing, or rubs  HEART: Regular rate and rhythm; No murmurs, rubs, or gallops  ABDOMEN: Soft, Nontender, Nondistended; Bowel sounds present, Large Sacral Decub ulcer unstageable s/p debridement  x 2  EXTREMITIES:  2+ Peripheral Pulses, No clubbing, cyanosis, or edema    MEDICATIONS  (STANDING):  albuterol/ipratropium for Nebulization. 3 milliLiter(s) Nebulizer every 6 hours  chlorhexidine 2% Cloths 1 Application(s) Topical <User Schedule>  chlorhexidine 4% Liquid 1 Application(s) Topical <User Schedule>  collagenase Ointment 1 Application(s) Topical daily  dextrose 5%. 1000 milliLiter(s) (50 mL/Hr) IV Continuous <Continuous>  dextrose 50% Injectable 12.5 Gram(s) IV Push once  dextrose 50% Injectable 25 Gram(s) IV Push once  dextrose 50% Injectable 25 Gram(s) IV Push once  epoetin sara Injectable 99279 Unit(s) IV Push <User Schedule>  insulin lispro (HumaLOG) corrective regimen sliding scale   SubCutaneous every 6 hours  lactulose Retention Enema 200 Gram(s) Rectal two times a day  pantoprazole  Injectable 40 milliGRAM(s) IV Push two times a day    MEDICATIONS  (PRN):  acetaminophen  Suppository .. 650 milliGRAM(s) Rectal every 6 hours PRN Temp greater or equal to 38C (100.4F), Mild Pain (1 - 3)  dextrose 40% Gel 15 Gram(s) Oral once PRN Blood Glucose LESS THAN 70 milliGRAM(s)/deciLiter  glucagon  Injectable 1 milliGRAM(s) IntraMuscular once PRN Glucose <70 milliGRAM(s)/deciLiter  morphine  - Injectable 1 milliGRAM(s) SubCutaneous every 4 hours PRN Moderate Pain (4 - 6)  sodium chloride 0.9% lock flush 10 milliLiter(s) IV Push every 1 hour PRN Pre/post blood products, medications, blood draw, and to maintain line patency      LABS:                        8.7    9.77  )-----------( 178      ( 03 Jan 2020 07:59 )             26.6     01-03    140  |  99  |  21.0<H>  ----------------------------<  113  3.7   |  24.0  |  2.29<H>    Ca    7.8<L>      03 Jan 2020 07:59  Phos  3.7     01-03  Mg     1.9     01-03    TPro  x   /  Alb  2.2<L>  /  TBili  x   /  DBili  x   /  AST  x   /  ALT  x   /  AlkPhos  x   01-02    PT/INR - ( 03 Jan 2020 07:59 )   PT: 16.7 sec;   INR: 1.44 ratio         PTT - ( 03 Jan 2020 07:59 )  PTT:31.2 sec

## 2020-01-03 NOTE — PROCEDURE NOTE - NSPOSTCAREGUIDE_GEN_A_CORE
Verbal/written post procedure instructions were given to patient/caregiver
Verbal/written post procedure instructions were given to patient/caregiver/Care for catheter as per unit/ICU protocols
Verbal/written post procedure instructions were given to patient/caregiver/Instructed patient/caregiver to follow-up with primary care physician/Keep the cast/splint/dressing clean and dry/Instructed patient/caregiver regarding signs and symptoms of infection/Care for catheter as per unit/ICU protocols

## 2020-01-03 NOTE — CHART NOTE - NSCHARTNOTEFT_GEN_A_CORE
Post Operative Check    Patient is post op from an Insertion of vascular access device for hemodialysis and is stable and without complaints post operatively. Seen and examined at bedside where nurse and PA were placing nasogastric tube. Denies pain at the permacath site, concerning neurological symptoms, chest pain, or difficulty breathing. Plan for HD tomorrow per nephrology.     Vitals    T(C): 36.8 (01-03-20 @ 07:42), Max: 36.8 (01-03-20 @ 07:42)  HR: 93 (01-03-20 @ 15:22) (80 - 96)  BP: 127/60 (01-03-20 @ 09:56) (101/58 - 131/62)  RR: 10 (01-03-20 @ 09:56) (10 - 20)  SpO2: 96% (01-03-20 @ 15:22) (96% - 99%)  Wt(kg): --      01-02 @ 07:01  -  01-03 @ 07:00  --------------------------------------------------------  IN:  Total IN: 0 mL    OUT:    Other: 1600 mL  Total OUT: 1600 mL    Total NET: -1600 mL        Labs                        8.7    9.77  )-----------( 178      ( 03 Jan 2020 07:59 )             26.6       CBC Full  -  ( 03 Jan 2020 07:59 )  WBC Count : 9.77 K/uL  Hemoglobin : 8.7 g/dL  Hematocrit : 26.6 %  Platelet Count - Automated : 178 K/uL  Mean Cell Volume : 85.8 fl  Mean Cell Hemoglobin : 28.1 pg  Mean Cell Hemoglobin Concentration : 32.7 gm/dL  Auto Neutrophil # : 8.46 K/uL  Auto Lymphocyte # : 0.75 K/uL  Auto Monocyte # : 0.45 K/uL  Auto Eosinophil # : 0.04 K/uL  Auto Basophil # : 0.01 K/uL  Auto Neutrophil % : 86.6 %  Auto Lymphocyte % : 7.7 %  Auto Monocyte % : 4.6 %  Auto Eosinophil % : 0.4 %  Auto Basophil % : 0.1 %      Physical Exam  General: Laying in bed in seated position in NAD   Cards: RRR S1S2  Resp: Coughing 2/2 NGT placement. Nasal canula in place. Unlabored breathing, no use of accessory muscles.   Ext: Upper extremities with motor and sensation intact.     Patient is a 64y old Female s/p Post Operative Check    Patient is post op from an Insertion of vascular access device for hemodialysis and is stable and without complaints post operatively. Seen and examined at bedside where nurse and PA were placing nasogastric tube. Denies pain at the permacath site, concerning neurological symptoms, chest pain, or difficulty breathing. Plan for HD tomorrow per nephrology.     Vitals    T(C): 36.8 (01-03-20 @ 07:42), Max: 36.8 (01-03-20 @ 07:42)  HR: 93 (01-03-20 @ 15:22) (80 - 96)  BP: 127/60 (01-03-20 @ 09:56) (101/58 - 131/62)  RR: 10 (01-03-20 @ 09:56) (10 - 20)  SpO2: 96% (01-03-20 @ 15:22) (96% - 99%)  Wt(kg): --      01-02 @ 07:01  -  01-03 @ 07:00  --------------------------------------------------------  IN:  Total IN: 0 mL    OUT:    Other: 1600 mL  Total OUT: 1600 mL    Total NET: -1600 mL        Labs                        8.7    9.77  )-----------( 178      ( 03 Jan 2020 07:59 )             26.6       CBC Full  -  ( 03 Jan 2020 07:59 )  WBC Count : 9.77 K/uL  Hemoglobin : 8.7 g/dL  Hematocrit : 26.6 %  Platelet Count - Automated : 178 K/uL  Mean Cell Volume : 85.8 fl  Mean Cell Hemoglobin : 28.1 pg  Mean Cell Hemoglobin Concentration : 32.7 gm/dL  Auto Neutrophil # : 8.46 K/uL  Auto Lymphocyte # : 0.75 K/uL  Auto Monocyte # : 0.45 K/uL  Auto Eosinophil # : 0.04 K/uL  Auto Basophil # : 0.01 K/uL  Auto Neutrophil % : 86.6 %  Auto Lymphocyte % : 7.7 %  Auto Monocyte % : 4.6 %  Auto Eosinophil % : 0.4 %  Auto Basophil % : 0.1 %      Physical Exam  General: Laying in bed in seated position in NAD   Cards: RRR S1S2. Permacath dressing on right chest wall clean, dry, and intact.   Resp: Coughing 2/2 NGT placement. Nasal canula in place. Unlabored breathing, no use of accessory muscles.   Ext: Upper extremities with motor and sensation intact.     Patient is a 64y old Female requiring HD now  s/p permacath placement which was uncomplicated. She offers no complaints and denies pain of insertion site.     - Will follow up to see if Permacath functioning for HD, 1/04  - Rest of care per primary team

## 2020-01-03 NOTE — CHART NOTE - NSCHARTNOTEFT_GEN_A_CORE
Called by RN for an oxygen order.  As per RN, pt is 02 dependent at home, came from MICU on O2 via nc.  Sating at 98% in NAD, ordered 2 L O2 via nc.  Continue to observe and reassess prn.

## 2020-01-04 LAB
GLUCOSE BLDC GLUCOMTR-MCNC: 126 MG/DL — HIGH (ref 70–99)
GLUCOSE BLDC GLUCOMTR-MCNC: 138 MG/DL — HIGH (ref 70–99)
GLUCOSE BLDC GLUCOMTR-MCNC: 151 MG/DL — HIGH (ref 70–99)
GLUCOSE BLDC GLUCOMTR-MCNC: 172 MG/DL — HIGH (ref 70–99)

## 2020-01-04 PROCEDURE — 90937 HEMODIALYSIS REPEATED EVAL: CPT

## 2020-01-04 PROCEDURE — 99233 SBSQ HOSP IP/OBS HIGH 50: CPT

## 2020-01-04 RX ORDER — TRAMADOL HYDROCHLORIDE 50 MG/1
25 TABLET ORAL EVERY 6 HOURS
Refills: 0 | Status: DISCONTINUED | OUTPATIENT
Start: 2020-01-04 | End: 2020-01-09

## 2020-01-04 RX ADMIN — Medication 650 MILLIGRAM(S): at 12:23

## 2020-01-04 RX ADMIN — MORPHINE SULFATE 1 MILLIGRAM(S): 50 CAPSULE, EXTENDED RELEASE ORAL at 22:34

## 2020-01-04 RX ADMIN — Medication 3 MILLILITER(S): at 15:26

## 2020-01-04 RX ADMIN — Medication 1 APPLICATION(S): at 05:12

## 2020-01-04 RX ADMIN — TRAMADOL HYDROCHLORIDE 25 MILLIGRAM(S): 50 TABLET ORAL at 14:26

## 2020-01-04 RX ADMIN — TRAMADOL HYDROCHLORIDE 25 MILLIGRAM(S): 50 TABLET ORAL at 13:56

## 2020-01-04 RX ADMIN — Medication 2: at 17:27

## 2020-01-04 RX ADMIN — LACTULOSE 200 GRAM(S): 10 SOLUTION ORAL at 17:28

## 2020-01-04 RX ADMIN — Medication 650 MILLIGRAM(S): at 11:53

## 2020-01-04 RX ADMIN — CHLORHEXIDINE GLUCONATE 1 APPLICATION(S): 213 SOLUTION TOPICAL at 05:12

## 2020-01-04 RX ADMIN — Medication 3 MILLILITER(S): at 09:02

## 2020-01-04 RX ADMIN — MORPHINE SULFATE 1 MILLIGRAM(S): 50 CAPSULE, EXTENDED RELEASE ORAL at 22:55

## 2020-01-04 RX ADMIN — Medication 3 MILLILITER(S): at 03:15

## 2020-01-04 RX ADMIN — PANTOPRAZOLE SODIUM 40 MILLIGRAM(S): 20 TABLET, DELAYED RELEASE ORAL at 17:27

## 2020-01-04 RX ADMIN — Medication 2: at 05:33

## 2020-01-04 RX ADMIN — LACTULOSE 200 GRAM(S): 10 SOLUTION ORAL at 05:13

## 2020-01-04 RX ADMIN — PANTOPRAZOLE SODIUM 40 MILLIGRAM(S): 20 TABLET, DELAYED RELEASE ORAL at 05:12

## 2020-01-04 RX ADMIN — ERYTHROPOIETIN 10000 UNIT(S): 10000 INJECTION, SOLUTION INTRAVENOUS; SUBCUTANEOUS at 17:27

## 2020-01-04 NOTE — PROGRESS NOTE ADULT - SUBJECTIVE AND OBJECTIVE BOX
HPI/OVERNIGHT EVENTS: Patient seen and examined at bedside. She is s/p right IJ permacath placement yesterday morning. She has no acute complaints at time of my exam. Minimal pain around permacath insertion site for which she has not felt the need to ask for medications. Patient denies b/l extremity numbness / tingling. Denies fever, chills, CP, or SOB at this time.     MEDICATIONS  (STANDING):  albuterol/ipratropium for Nebulization. 3 milliLiter(s) Nebulizer every 6 hours  chlorhexidine 2% Cloths 1 Application(s) Topical <User Schedule>  chlorhexidine 4% Liquid 1 Application(s) Topical <User Schedule>  collagenase Ointment 1 Application(s) Topical daily  dextrose 5%. 1000 milliLiter(s) (50 mL/Hr) IV Continuous <Continuous>  dextrose 50% Injectable 12.5 Gram(s) IV Push once  dextrose 50% Injectable 25 Gram(s) IV Push once  dextrose 50% Injectable 25 Gram(s) IV Push once  epoetin sara Injectable 48542 Unit(s) IV Push <User Schedule>  insulin lispro (HumaLOG) corrective regimen sliding scale   SubCutaneous every 6 hours  lactulose Retention Enema 200 Gram(s) Rectal two times a day  pantoprazole  Injectable 40 milliGRAM(s) IV Push two times a day    MEDICATIONS  (PRN):  acetaminophen  Suppository .. 650 milliGRAM(s) Rectal every 6 hours PRN Temp greater or equal to 38C (100.4F), Mild Pain (1 - 3)  dextrose 40% Gel 15 Gram(s) Oral once PRN Blood Glucose LESS THAN 70 milliGRAM(s)/deciLiter  glucagon  Injectable 1 milliGRAM(s) IntraMuscular once PRN Glucose <70 milliGRAM(s)/deciLiter  morphine  - Injectable 1 milliGRAM(s) SubCutaneous every 4 hours PRN Moderate Pain (4 - 6)  sodium chloride 0.9% lock flush 10 milliLiter(s) IV Push every 1 hour PRN Pre/post blood products, medications, blood draw, and to maintain line patency      Vital Signs Last 24 Hrs  T(C): 37.1 (04 Jan 2020 00:24), Max: 37.6 (03 Jan 2020 16:00)  T(F): 98.7 (04 Jan 2020 00:24), Max: 99.7 (03 Jan 2020 16:00)  HR: 87 (04 Jan 2020 03:15) (80 - 96)  BP: 105/88 (04 Jan 2020 00:24) (101/58 - 131/62)  BP(mean): --  RR: 18 (04 Jan 2020 00:24) (10 - 20)  SpO2: 94% (04 Jan 2020 03:15) (94% - 99%)    Constitutional: patient appears comfortable lying in bed, in no acute distress  Respiratory: on bipap, respirations appear unlabored, no accessory muscle use, no conversational dyspnea  Cardiovascular: regular rate & rhythm  GI: abd soft, NT, ND, receiving tube feeds via NGT  Vascular: dressing overlying right IJ permacath site is C/D/I with no drainage or surrounding erythema. RUE compartments soft, distal sensation grossly intact, palpable right radial pulse      I&O's Detail    02 Jan 2020 07:01  -  03 Jan 2020 07:00  --------------------------------------------------------  IN:  Total IN: 0 mL    OUT:    Other: 1600 mL  Total OUT: 1600 mL    Total NET: -1600 mL      03 Jan 2020 07:01  -  04 Jan 2020 03:19  --------------------------------------------------------  IN:    Glucerna 1.5: 50 mL  Total IN: 50 mL    OUT:  Total OUT: 0 mL    Total NET: 50 mL          LABS:                        8.7    9.77  )-----------( 178      ( 03 Jan 2020 07:59 )             26.6     01-03    140  |  99  |  21.0<H>  ----------------------------<  113  3.7   |  24.0  |  2.29<H>    Ca    7.8<L>      03 Jan 2020 07:59  Phos  3.7     01-03  Mg     1.9     01-03    TPro  x   /  Alb  2.2<L>  /  TBili  x   /  DBili  x   /  AST  x   /  ALT  x   /  AlkPhos  x   01-02    PT/INR - ( 03 Jan 2020 07:59 )   PT: 16.7 sec;   INR: 1.44 ratio         PTT - ( 03 Jan 2020 07:59 )  PTT:31.2 sec

## 2020-01-04 NOTE — PROGRESS NOTE ADULT - SUBJECTIVE AND OBJECTIVE BOX
[facility] DIVISION OF KIDNEY DISEASES AND HYPERTENSION -- HEMODIALYSIS NOTE  --------------------------------------------------------------------------------  Chief Complaint: ESRD/Ongoing hemodialysis requirement    24 hour events/subjective:  s/p tunneled HD catheter yesterday  Pt seen and examined; feels well  No acute complaint      PAST HISTORY  --------------------------------------------------------------------------------  No significant changes to PMH, PSH, FHx, SHx, unless otherwise noted    ALLERGIES & MEDICATIONS  --------------------------------------------------------------------------------  Allergies    ACE inhibitors (Other (Mild))    Intolerances    oxycodone (Sedation/Somnol)    Standing Inpatient Medications  albuterol/ipratropium for Nebulization. 3 milliLiter(s) Nebulizer every 6 hours  chlorhexidine 2% Cloths 1 Application(s) Topical <User Schedule>  chlorhexidine 4% Liquid 1 Application(s) Topical <User Schedule>  collagenase Ointment 1 Application(s) Topical daily  dextrose 5%. 1000 milliLiter(s) IV Continuous <Continuous>  dextrose 50% Injectable 12.5 Gram(s) IV Push once  dextrose 50% Injectable 25 Gram(s) IV Push once  dextrose 50% Injectable 25 Gram(s) IV Push once  epoetin sara Injectable 88062 Unit(s) IV Push <User Schedule>  insulin lispro (HumaLOG) corrective regimen sliding scale   SubCutaneous every 6 hours  lactulose Retention Enema 200 Gram(s) Rectal two times a day  pantoprazole  Injectable 40 milliGRAM(s) IV Push two times a day    PRN Inpatient Medications  acetaminophen  Suppository .. 650 milliGRAM(s) Rectal every 6 hours PRN  dextrose 40% Gel 15 Gram(s) Oral once PRN  glucagon  Injectable 1 milliGRAM(s) IntraMuscular once PRN  morphine  - Injectable 1 milliGRAM(s) SubCutaneous every 4 hours PRN  sodium chloride 0.9% lock flush 10 milliLiter(s) IV Push every 1 hour PRN  traMADol 25 milliGRAM(s) Oral every 6 hours PRN      REVIEW OF SYSTEMS  --------------------------------------------------------------------------------  Gen: No weight changes, fatigue, fevers/chills, weakness  Skin: No rashes  Head/Eyes/Ears/Mouth: No headache  Respiratory: No dyspnea, cough,  CV: No chest pain, orthopnea  GI: No abdominal pain, diarrhea, constipation, nausea, vomiting,  MSK: No joint pain  Neuro: No dizziness/lightheadedness, weakness  Heme: No bleeding  Psych: No significant nervousness, anxiety, stress, depression    All other systems were reviewed and are negative, except as noted.    VITALS/PHYSICAL EXAM  --------------------------------------------------------------------------------  T(C): 36.7 (01-04-20 @ 18:15), Max: 37.1 (01-04-20 @ 00:24)  HR: 83 (01-04-20 @ 18:15) (83 - 92)  BP: 123/48 (01-04-20 @ 18:15) (102/62 - 123/48)  RR: 18 (01-04-20 @ 18:15) (18 - 18)  SpO2: 98% (01-04-20 @ 18:15) (94% - 98%)  Wt(kg): --        01-03-20 @ 07:01  -  01-04-20 @ 07:00  --------------------------------------------------------  IN: 140 mL / OUT: 0 mL / NET: 140 mL      Physical Exam:  	Gen: NAD  	HEENT; Rt eye blindness  	Pulm: CTA B/L  	CV: RRR, S1S2; no rub  	Abd: +BS, soft, nontender  	UE: Warm, intact strength; no asterixis  	LE: Warm, no edema  	Neuro: No focal deficits  	Psych: calm, normal affect  	Skin: Warm  	Vascular access: RIJ tunneled HD catheter+    LABS/STUDIES  --------------------------------------------------------------------------------              8.7    9.77  >-----------<  178      [01-03-20 @ 07:59]              26.6     140  |  99  |  21.0  ----------------------------<  113      [01-03-20 @ 07:59]  3.7   |  24.0  |  2.29        Ca     7.8     [01-03-20 @ 07:59]      Mg     1.9     [01-03-20 @ 07:59]      Phos  3.7     [01-03-20 @ 07:59]      PT/INR: PT 16.7 , INR 1.44       [01-03-20 @ 07:59]  PTT: 31.2       [01-03-20 @ 07:59]      Iron 25, TIBC 154, %sat 16      [01-02-20 @ 09:39]  Ferritin 668      [01-02-20 @ 09:39]  PTH -- (Ca 8.0)      [01-02-20 @ 17:06]   119  PTH -- (Ca 9.1)      [05-30-19 @ 18:09]   107  PTH -- (Ca 7.9)      [01-17-19 @ 16:26]   175  PTH -- (Ca 8.1)      [01-16-19 @ 16:55]   148  Vitamin D (25OH) 25.2      [01-02-20 @ 17:06]  HbA1c 5.8      [12-26-19 @ 09:04]  TSH 1.10      [05-17-19 @ 12:21]    HBsAb 18.0      [12-26-19 @ 22:01]  HBsAg Nonreact      [12-26-19 @ 22:01]  HBcAb Nonreact      [12-26-19 @ 22:01]  HCV 0.20, Nonreact      [12-26-19 @ 22:01]

## 2020-01-04 NOTE — PROGRESS NOTE ADULT - ASSESSMENT
1) KEITH on CKD now likely ESRD  2) encephalopathy- improved  4) Acute blood loss anemia superimposed on anemia of CKD  5) Hypotension      HD today  Continue Midodrine prior to HD  Continue QUENTIN with HD  Monitor lytes, BP and UOP  Will follow

## 2020-01-04 NOTE — PROGRESS NOTE ADULT - SUBJECTIVE AND OBJECTIVE BOX
follow up for  large decub, oropharyngeal dysphagia  chart reviewed , pt is seen examined in am   she was refusing to go for upper ext US , now convinced , + loer back pain   NGT feeding in progress     poor historian ]    Vital Signs Last 24 Hrs  T(C): 37.1 (04 Jan 2020 00:24), Max: 37.1 (04 Jan 2020 00:24)  T(F): 98.7 (04 Jan 2020 00:24), Max: 98.7 (04 Jan 2020 00:24)  HR: 86 (04 Jan 2020 15:38) (86 - 92)  BP: 112/59 (04 Jan 2020 08:32) (102/62 - 112/59)  BP(mean): --  RR: 18 (04 Jan 2020 08:32) (18 - 18)  SpO2: 96% (04 Jan 2020 05:01) (94% - 98%)    PHYSICAL EXAM-  GENERAL: awake and alert   NECK: Supple, No JVD   CHEST/LUNG: Diminished but CTA bilaterally; No rales, rhonchi, wheezing, or rubs  HEART: Regular rate and rhythm; S1/s2 No murmurs, rubs, or gallops  ABDOMEN: Soft, Nontender, Nondistended; Bowel sounds present  EXTREMITIES:  No clubbing, cyanosis, or edema                          8.7    9.77  )-----------( 178      ( 03 Jan 2020 07:59 )             26.6   01-03    140  |  99  |  21.0<H>  ----------------------------<  113  3.7   |  24.0  |  2.29<H>    Ca    7.8<L>      03 Jan 2020 07:59  Phos  3.7     01-03  Mg     1.9     01-03

## 2020-01-04 NOTE — PROGRESS NOTE ADULT - ASSESSMENT
63 yo female with hx of CHFpEF, CAD, CKD, HTN, DM, cirrhosis, portal gastropathy, recurrent GI bleed, blind, sacral decub ulcer, presented to the ED from nursing home with encephalopathy, hypoxic resp failure requiring bipap, in ED found to have anemia with Hb 5 that responded to blood transfusions (hx of recurrent GI bleed for which multiple EGDs/colonoscopies have been performed, no further invasive work up as per GI), sepsis secondary to PNA on abx, KEITH on CKD now requiring HD via left IJ. tunnelled cath palced for access on 1/3/20. PT failed s/s and is NPO. starting NGT feeds today. and family to make decisions over the weekend. multiple family conversations ongoing    1- Unstageable Sacral Decub  s/p debridement bedside x 2   Surg plans debridement in OR  However family has their concerns about GA and are refusing same for now  Even when Spinal anesthesia was offered today family adamantly refused OR procedure  Surgery signed off    2- Patient lacks medical decision making capacity  family finally agrees with this and they are willing to make the decision  But HCP 1 dgtr and HCP 2 pt's sister stated that they will get together this weekend and make end of life, artifical feeds decisions    3-ORopharengeal dysphagia   Currently made NPO and meds changed to other routes  Family wants aggressive suctioning of throat and they want to see if that improves her swallowing capacity  Freq suctioning ordered  SInce it will be day 3 on 1/3/20 as NPO, now on ngt feed   Family agreeable with this   They state that they will make decision over weekend for PEG tube or not    4/p Metabolic vs Uremic vs Hepatic Encephalopathy  Likely multifactorial - Hepatic, Uremic. Uremia has resolved. Hepatic- responds well to lactulose  CT head negative, EEG without seizures      5Acute on chronic diastolic CHF/ Acute hypoxic respiratory failure  resolving well  Sec to pulm edema  No longer requiring bipap and now weaned off Hi yazmin O2 and tolerating nasal cannula  Continue to taper supplemental NC O2 as able    6Acute blood loss Anemia   Hx of recurrent GI bleed? prior EGDs/ colonoscopies this year which were negative, prior CTA with +transverse colon bleed  Seen by GI on this admission, no invasive intervention   Responded to blood transfusions  No signs of active bleeding at this time.    Protonix, octreotide- total 5 days through 12/30/19 completed  Avoid chemical DVT PPE for now     7Known CASTAÑEDA Cirrhosis with portal gastropathy and AVMs  Lactulose and rifaximin  Octreotide, PPI    8-KEITH/ATN on CKD  Likely sec to blood loss  Was briefly on dialysis in the past  Renal US showed no hydronephrosis    Discussed w Dr Castro - pt will likely need long term HD, eventual plan for PermCath / AVF seen by [facility] surgery team  mapping and tentative plan for permacath thursday, holding off on AVF planning for now  s/p tunnelled cathter placement by Vas con 1/3/20    Sepsis  Secondary to PNA, b/l infiltrates on CT R>L  (previously treated with zosyn at NH)  Abx broadened to merrem x 5 days, last day 1/1/20. Completed  Ucx negative- Blood cx negative   Unstageable sacral decub debrided x 2 by surgical team,  Cont wound care  ID consulted for any further antibiotic recs     DM2  Well controlled  Cont current regimen     Lung Nodule  Incidental finding on CT  Will need outpatient follow up with pulm for repeat imaging in 6 months         D/W Family and HCP- they want to discuss as family over weekend. they want freq suctioning of throat to be done and they hope this will improve her swallow capacity. they are accepting of starting NGT feeds for nutrition while they decide about PEG.

## 2020-01-04 NOTE — PROGRESS NOTE ADULT - ASSESSMENT
63 y/o female admitted with GI bleed - hospital course complicated by KEITH on CKD requiring initiation of hemodialysis. Vascular surgery consulted for access - patient is now POD1 s/p right IJ permacath placement. Procedure was completed without complication & patient returned to floor in stable condition.   - Plan for HD today 1/4 via right IJ permacath  - Will f/u how pt tolerates HD via PC  - Remainder of care as per primary team

## 2020-01-05 LAB
GLUCOSE BLDC GLUCOMTR-MCNC: 136 MG/DL — HIGH (ref 70–99)
GLUCOSE BLDC GLUCOMTR-MCNC: 143 MG/DL — HIGH (ref 70–99)
GLUCOSE BLDC GLUCOMTR-MCNC: 153 MG/DL — HIGH (ref 70–99)
GLUCOSE BLDC GLUCOMTR-MCNC: 188 MG/DL — HIGH (ref 70–99)
GLUCOSE BLDC GLUCOMTR-MCNC: 229 MG/DL — HIGH (ref 70–99)

## 2020-01-05 PROCEDURE — 93985 DUP-SCAN HEMO COMPL BI STD: CPT | Mod: 26

## 2020-01-05 PROCEDURE — 99233 SBSQ HOSP IP/OBS HIGH 50: CPT

## 2020-01-05 RX ORDER — PIPERACILLIN AND TAZOBACTAM 4; .5 G/20ML; G/20ML
2.25 INJECTION, POWDER, LYOPHILIZED, FOR SOLUTION INTRAVENOUS EVERY 8 HOURS
Refills: 0 | Status: DISCONTINUED | OUTPATIENT
Start: 2020-01-05 | End: 2020-01-05

## 2020-01-05 RX ORDER — PIPERACILLIN AND TAZOBACTAM 4; .5 G/20ML; G/20ML
3.38 INJECTION, POWDER, LYOPHILIZED, FOR SOLUTION INTRAVENOUS ONCE
Refills: 0 | Status: COMPLETED | OUTPATIENT
Start: 2020-01-05 | End: 2020-01-05

## 2020-01-05 RX ORDER — HEPARIN SODIUM 5000 [USP'U]/ML
5000 INJECTION INTRAVENOUS; SUBCUTANEOUS EVERY 8 HOURS
Refills: 0 | Status: DISCONTINUED | OUTPATIENT
Start: 2020-01-05 | End: 2020-01-08

## 2020-01-05 RX ORDER — IPRATROPIUM/ALBUTEROL SULFATE 18-103MCG
3 AEROSOL WITH ADAPTER (GRAM) INHALATION EVERY 6 HOURS
Refills: 0 | Status: DISCONTINUED | OUTPATIENT
Start: 2020-01-05 | End: 2020-01-05

## 2020-01-05 RX ORDER — PIPERACILLIN AND TAZOBACTAM 4; .5 G/20ML; G/20ML
0.75 INJECTION, POWDER, LYOPHILIZED, FOR SOLUTION INTRAVENOUS ONCE
Refills: 0 | Status: DISCONTINUED | OUTPATIENT
Start: 2020-01-05 | End: 2020-01-05

## 2020-01-05 RX ORDER — PIPERACILLIN AND TAZOBACTAM 4; .5 G/20ML; G/20ML
3.38 INJECTION, POWDER, LYOPHILIZED, FOR SOLUTION INTRAVENOUS EVERY 12 HOURS
Refills: 0 | Status: COMPLETED | OUTPATIENT
Start: 2020-01-06 | End: 2020-01-10

## 2020-01-05 RX ADMIN — TRAMADOL HYDROCHLORIDE 25 MILLIGRAM(S): 50 TABLET ORAL at 17:35

## 2020-01-05 RX ADMIN — LACTULOSE 200 GRAM(S): 10 SOLUTION ORAL at 06:14

## 2020-01-05 RX ADMIN — Medication 2: at 06:14

## 2020-01-05 RX ADMIN — TRAMADOL HYDROCHLORIDE 25 MILLIGRAM(S): 50 TABLET ORAL at 09:41

## 2020-01-05 RX ADMIN — Medication 650 MILLIGRAM(S): at 08:41

## 2020-01-05 RX ADMIN — Medication 3 MILLILITER(S): at 15:14

## 2020-01-05 RX ADMIN — Medication 650 MILLIGRAM(S): at 10:11

## 2020-01-05 RX ADMIN — PIPERACILLIN AND TAZOBACTAM 200 GRAM(S): 4; .5 INJECTION, POWDER, LYOPHILIZED, FOR SOLUTION INTRAVENOUS at 17:34

## 2020-01-05 RX ADMIN — Medication 3 MILLILITER(S): at 08:31

## 2020-01-05 RX ADMIN — Medication 4: at 17:34

## 2020-01-05 RX ADMIN — TRAMADOL HYDROCHLORIDE 25 MILLIGRAM(S): 50 TABLET ORAL at 18:05

## 2020-01-05 RX ADMIN — CHLORHEXIDINE GLUCONATE 1 APPLICATION(S): 213 SOLUTION TOPICAL at 06:13

## 2020-01-05 RX ADMIN — HEPARIN SODIUM 5000 UNIT(S): 5000 INJECTION INTRAVENOUS; SUBCUTANEOUS at 22:06

## 2020-01-05 RX ADMIN — LACTULOSE 200 GRAM(S): 10 SOLUTION ORAL at 17:34

## 2020-01-05 RX ADMIN — Medication 3 MILLILITER(S): at 03:16

## 2020-01-05 RX ADMIN — Medication 3 MILLILITER(S): at 19:53

## 2020-01-05 RX ADMIN — CHLORHEXIDINE GLUCONATE 1 APPLICATION(S): 213 SOLUTION TOPICAL at 06:15

## 2020-01-05 RX ADMIN — TRAMADOL HYDROCHLORIDE 25 MILLIGRAM(S): 50 TABLET ORAL at 10:11

## 2020-01-05 RX ADMIN — PANTOPRAZOLE SODIUM 40 MILLIGRAM(S): 20 TABLET, DELAYED RELEASE ORAL at 17:34

## 2020-01-05 RX ADMIN — Medication 1 APPLICATION(S): at 06:13

## 2020-01-05 RX ADMIN — PANTOPRAZOLE SODIUM 40 MILLIGRAM(S): 20 TABLET, DELAYED RELEASE ORAL at 06:14

## 2020-01-05 NOTE — PROGRESS NOTE ADULT - ASSESSMENT
1) KEITH on CKD now likely ESRD  2) encephalopathy- improved  4) Acute blood loss anemia superimposed on anemia of CKD  5) Hypotension- BP now improved      Next HD on Tueday  Continue QUENTIN   Monitor lytes, BP and UOP  AVF mapping  Will follow

## 2020-01-05 NOTE — CONSULT NOTE ADULT - ASSESSMENT
ASSESSMENT: Patient is a 64y old female with CKD on HD via R IJ Shiely catheter who had a Midline catheter inserted in her left brachial vein and recent US demonstrated a non occlusive DVT surrounding the catheter. It is not recommended to place Midline/PIC line catheters on patients who are CKD for risk of DVTs and because it can compromise future use of those veins for AVF creations in the future.     PLAN:    - Recommend removal of Midline catheter as soon as possible  - Full anticoagulation for treatment of DVT when primary team decides its safe since patient is at risk for GI bleed.   - Plan discussed with Attending, Dr. Henderson

## 2020-01-05 NOTE — PROGRESS NOTE ADULT - ASSESSMENT
65 y/o female admitted with GI bleed - hospital course complicated by KEITH on CKD requiring initiation of hemodialysis. Vascular surgery consulted for access - patient is now POD2 s/p right IJ permacath placement. Procedure was completed without complication & patient returned to floor in stable condition.   - Patient tolerated HD 1/4 via right IJ permacath  - Remainder of care as per primary team  - No further vascular surgical intervention indicated at this time. Please reconsult as necessary

## 2020-01-05 NOTE — PROGRESS NOTE ADULT - SUBJECTIVE AND OBJECTIVE BOX
[facility] DIVISION OF KIDNEY DISEASES AND HYPERTENSION -- HEMODIALYSIS NOTE  --------------------------------------------------------------------------------  Chief Complaint: ESRD/Ongoing hemodialysis requirement    24 hour events/subjective:  s/p HD yesterday with 1.7 L UF    PAST HISTORY  --------------------------------------------------------------------------------  No significant changes to PMH, PSH, FHx, SHx, unless otherwise noted    ALLERGIES & MEDICATIONS  --------------------------------------------------------------------------------  Allergies    ACE inhibitors (Other (Mild))    Intolerances    oxycodone (Sedation/Somnol)    Standing Inpatient Medications  albuterol/ipratropium for Nebulization 3 milliLiter(s) Nebulizer every 6 hours  albuterol/ipratropium for Nebulization. 3 milliLiter(s) Nebulizer every 6 hours  chlorhexidine 2% Cloths 1 Application(s) Topical <User Schedule>  chlorhexidine 4% Liquid 1 Application(s) Topical <User Schedule>  collagenase Ointment 1 Application(s) Topical daily  dextrose 5%. 1000 milliLiter(s) IV Continuous <Continuous>  dextrose 50% Injectable 12.5 Gram(s) IV Push once  dextrose 50% Injectable 25 Gram(s) IV Push once  dextrose 50% Injectable 25 Gram(s) IV Push once  epoetin sara Injectable 96194 Unit(s) IV Push <User Schedule>  insulin lispro (HumaLOG) corrective regimen sliding scale   SubCutaneous every 6 hours  lactulose Retention Enema 200 Gram(s) Rectal two times a day  pantoprazole  Injectable 40 milliGRAM(s) IV Push two times a day  piperacillin/tazobactam IVPB. 3.375 Gram(s) IV Intermittent once  piperacillin/tazobactam IVPB.. 3.375 Gram(s) IV Intermittent every 12 hours    PRN Inpatient Medications  acetaminophen  Suppository .. 650 milliGRAM(s) Rectal every 6 hours PRN  dextrose 40% Gel 15 Gram(s) Oral once PRN  glucagon  Injectable 1 milliGRAM(s) IntraMuscular once PRN  morphine  - Injectable 1 milliGRAM(s) SubCutaneous every 4 hours PRN  sodium chloride 0.9% lock flush 10 milliLiter(s) IV Push every 1 hour PRN  traMADol 25 milliGRAM(s) Oral every 6 hours PRN      REVIEW OF SYSTEMS  --------------------------------------------------------------------------------  Gen: No weight changes, fatigue, fevers/chills, weakness  Skin: No rashes  Head/Eyes/Ears/Mouth: No headache  Respiratory: No dyspnea, cough,  CV: No chest pain, orthopnea  GI: No abdominal pain, diarrhea, constipation, nausea, vomiting,  MSK: No joint pain  Neuro: No dizziness/lightheadedness, weakness  Heme: No bleeding  Psych: No significant nervousness, anxiety, stress, depression    All other systems were reviewed and are negative, except as noted.    VITALS/PHYSICAL EXAM  --------------------------------------------------------------------------------  T(C): 36.7 (01-05-20 @ 08:38), Max: 36.7 (01-04-20 @ 18:15)  HR: 77 (01-05-20 @ 15:17) (70 - 83)  BP: 127/72 (01-05-20 @ 08:38) (123/48 - 141/75)  RR: 18 (01-05-20 @ 08:38) (18 - 18)  SpO2: 97% (01-05-20 @ 15:17) (97% - 99%)  Wt(kg): --        01-04-20 @ 07:01  -  01-05-20 @ 07:00  --------------------------------------------------------  IN: 0 mL / OUT: 1700 mL / NET: -1700 mL    01-05-20 @ 07:01  -  01-05-20 @ 15:55  --------------------------------------------------------  IN: 0 mL / OUT: 0 mL / NET: 0 mL      Physical Exam:  	Gen: NAD  	HEENT; Rt eye blindness  	Pulm: CTA B/L  	CV: RRR, S1S2; no rub  	Abd: +BS, soft, nontender  	UE: Warm, intact strength; no asterixis  	LE: Warm, no edema  	Neuro: No focal deficits  	Psych: calm, normal affect  	Skin: Warm  	Vascular access: RIJ tunneled HD catheter+    LABS/STUDIES  --------------------------------------------------------------------------------                Iron 25, TIBC 154, %sat 16      [01-02-20 @ 09:39]  Ferritin 668      [01-02-20 @ 09:39]  PTH -- (Ca 8.0)      [01-02-20 @ 17:06]   119  PTH -- (Ca 9.1)      [05-30-19 @ 18:09]   107  PTH -- (Ca 7.9)      [01-17-19 @ 16:26]   175  PTH -- (Ca 8.1)      [01-16-19 @ 16:55]   148  Vitamin D (25OH) 25.2      [01-02-20 @ 17:06]  HbA1c 5.8      [12-26-19 @ 09:04]  TSH 1.10      [05-17-19 @ 12:21]    HBsAb 18.0      [12-26-19 @ 22:01]  HBsAg Nonreact      [12-26-19 @ 22:01]  HBcAb Nonreact      [12-26-19 @ 22:01]  HCV 0.20, Nonreact      [12-26-19 @ 22:01]

## 2020-01-05 NOTE — CONSULT NOTE ADULT - SUBJECTIVE AND OBJECTIVE BOX
Vascular Surgery     HPI: 64y Female with PMH of CHF, CKD, CAD, HTN, DM, cirrhosis, and recurrent GIB who was admitted to the MICU for hypoxemia and hypovolemic shock suspected to be from UGIB because of past history, melanotic stool, and Hgb of 5.8 on admission. Patient was adequately resuscitated on the hospital course and downgraded to the floor. Patient had a R IJ Shiley catheter placed for HD. Patient has a left brachial vein midline with recent finding of DVT surrounding the catheter, reason for Vascular surgery consult.      ROS: 10-system review is otherwise negative except HPI above.      PAST MEDICAL & SURGICAL HISTORY:  Legally blind in right eye, as defined in [place]  CAD in native artery  Chronic CHF  DM (diabetes mellitus), type 2  GERD (gastroesophageal reflux disease)  Hyperlipidemia  HTN (hypertension)  Congestive heart failure, unspecified HF chronicity, unspecified heart failure type  Insomnia  Retinal detachment  CAD (coronary artery disease)  Glaucoma  Urinary retention  Peripheral neuropathy  GERD (gastroesophageal reflux disease)  Depression  Diabetes  Hypertension, unspecified type  Herniated disc, cervical  PAD (peripheral artery disease)  Legally blind  History of peripheral vascular disease  History of retinal detachment  History of CEA (carotid endarterectomy): Right  Hyperlipidemia  Glaucoma  Hypertension  Diabetes  H/O carotid endarterectomy  S/P CABG (coronary artery bypass graft)  No significant past surgical history  S/P CABG x 1  After cataract  Uveitic glaucoma of both eyes  Detached retina  Atherosclerosis of right carotid artery   delivery delivered    FAMILY HISTORY:  No pertinent family history in first degree relatives  FH: type 2 diabetes: mom and dad    ALLERGIES: NKA ACE inhibitors (Other (Mild))  oxycodone (Sedation/Somnol)      HOME MEDICATIONS:   acetaminophen 325 mg oral tablet: 3 tab(s) orally every 6 hours, As Needed (27 Dec 2019 12:12)  Aquaphor topical ointment: Apply topically to B/L foot every day and evening shift for dry skin (27 Dec 2019 12:12)  Aspercreme with Lidocaine 4% topical cream: Apply topically to affected area once a day (1 patch to left posterior arm and 1 patch to lower back) for pain (27 Dec 2019 12:12)  atorvastatin 80 mg oral tablet: 1 tab(s) orally once a day (at bedtime) for HLD (27 Dec 2019 12:12)  budesonide 0.5 mg/2 mL inhalation suspension: 2 milliliter(s) inhaled 2 times a day for SOB (27 Dec 2019 12:12)  carvedilol 3.125 mg oral tablet: 1 tab(s) orally 2 times a day for hypertension (27 Dec 2019 12:12)  clopidogrel 75 mg oral tablet: 1 tab(s) orally once a day for blood clot prevention (27 Dec 2019 12:12)  Dakins Half Strength 0.25% topical solution: Apply to sacral wound topically every day and evening shift to pressure injury. Cleanse wound with dakins, pack with avani moistened with dakins, cover with foam and bg twice daily and as needed (27 Dec 2019 12:12)  ferrous sulfate 325 mg (65 mg elemental iron) oral tablet: 1 tab(s) orally once a day for anemia (27 Dec 2019 12:12)  gabapentin 300 mg oral capsule: 1 cap(s) orally 3 times a day for neuropathy (27 Dec 2019 12:12)  heparin 5000 units/mL injectable solution: 1 milliliter(s) subcutaneous every 8 hours for blood clot prevention (27 Dec 2019 12:12)  HumaLOG 100 units/mL subcutaneous solution: Inject SQ before meals for diabetes as per sliding scale if  101-150 = 2 units  150-200 = 4 units  201-250 = 6 units  251-300 = 8 units  301-350 = 10 units  351-400 = 12 units  BS&gt;401, Give 18 units and Call MD  BS&lt;60, Call MD (27 Dec 2019 12:12)  ipratropium-albuterol 0.5 mg-2.5 mg/3 mLinhalation solution: 3 milliliter(s) inhaled every 6 hours for SOB (27 Dec 2019 12:12)  isosorbide mononitrate 30 mg oral tablet, extended release: 1 tab(s) orally once a day for HTN (27 Dec 2019 12:12)  Lidoderm 5% topical film: Apply topically to lower back area and lower posterior arm once a day (27 Dec 2019 12:12)  Maalox Plus 225 mg-200 mg-25 mg/5 mL oral suspension: 30 milliliter(s) orally every 6 hours, As Needed - for heartburn (27 Dec 2019 12:12)  Melatonin 3 mg oral tablet: 1 tab(s) orally once a day (at bedtime) (27 Dec 2019 12:12)  menthol-zinc oxide 0.44%-20.625% topical ointment: Apply topically to inner buttocks topically every day and evening shift for skin excoriation (27 Dec 2019 12:12)  menthol-zinc oxide 0.44%-20.625% topical ointment: Apply topically to buttock wound 2 times a day (27 Dec 2019 12:12)  miconazole 2% topical cream: Apply topically to left breast wound  2 times a day (27 Dec 2019 12:12)  octreotide 20 mg intramuscular injection, extended release: 20 milligram(s) intramuscular every 28 days (27 Dec 2019 12:12)  pantoprazole 40 mg oral delayed release tablet: 1 tab(s) orally every 12 hours for GERD (27 Dec 2019 12:12)  traMADol 50 mg oral tablet: 1 tab(s) orally every 8 hours, As Needed - for moderate pain, for severe pain (27 Dec 2019 12:12)  Zosyn 3 g-0.375 g/50 mL intravenous solution: 3.375 gram(s) intravenous every 12 hours  started 12/21 x 7 days (27 Dec 2019 12:12)      --------------------------------------------------------------------------------------------    PHYSICAL EXAM:   General: NAD, Lying in bed comfortably  HEENT: Legally blind, MMM. R IJ shiley catheter.   Cardio: RRR   Resp: Non labored breathing   GI/Abd: Soft, NT/ND, no rebound/guarding, no masses palpated  Vascular: All 4 extremities warm. Palpable b/l radial and ulnar pulses. Intact sensation and motor function of b/l upper extremities.   Musculoskeletal: All 4 extremities moving spontaneously, no limitations. B/L upper extremities swollen L>R. Left midline catheter in place.   --------------------------------------------------------------------------------------------    LABS        CAPILLARY BLOOD GLUCOSE        --------------------------------------------------------------------------------------------  IMAGING  Upper extremity US: IMPRESSION:   1. Nonocclusive deep vein thrombosis in one of the duplicated left brachial   veins surrounding a catheter.   2. No evidence of deep vein thrombosis in the right upper extremity.   3. Superficial venous thrombosis involving the right basilic vein in the   upper arm and the left cephalic vein in the upper arm.   4. Cephalic and basilic vein measurements as described.

## 2020-01-05 NOTE — PROGRESS NOTE ADULT - SUBJECTIVE AND OBJECTIVE BOX
follow up for  large decub, oropharyngeal dysphagia  chart reviewed , pt is seen examined in am , she is with c/o lower back pain   + cough congestion noted   CXR result reviewed   pt is with upper ext doppler mapping , abnormal  findings d/w vascular surgery team re Us result     Vital Signs Last 24 Hrs  T(C): 36.7 (05 Jan 2020 08:38), Max: 36.7 (04 Jan 2020 18:15)  T(F): 98.1 (05 Jan 2020 08:38), Max: 98.1 (04 Jan 2020 22:30)  HR: 83 (05 Jan 2020 08:38) (70 - 86)  BP: 127/72 (05 Jan 2020 08:38) (123/48 - 141/75)  BP(mean): --  RR: 18 (05 Jan 2020 08:38) (18 - 18)  SpO2: 98% (05 Jan 2020 08:32) (98% - 99%)    PHYSICAL EXAM-  GENERAL: awake and alert   NECK: Supple, No JVD   CHEST/LUNG: bilateral diffise rhonchi , coarse BS   HEART: Regular rate and rhythm; S1/s2 No murmurs, rubs, or gallops  ABDOMEN: Soft, Nontender, Nondistended; Bowel sounds present  EXTREMITIES:  No clubbing, bilateral  heel protector in place     < from: Xray Chest 1 View- PORTABLE-Urgent (01.03.20 @ 18:04) >    IMPRESSION: NG tube in stomach.  RIGHT perihilar airspace consolidation.          < end of copied text > follow up for  large decub, oropharyngeal dysphagia  chart reviewed , pt is seen examined in am , she is with c/o lower back pain   + cough congestion noted   CXR result reviewed   pt is with upper ext doppler mapping , abnormal  findings d/w vascular surgery team re Us result     Vital Signs Last 24 Hrs  T(C): 36.7 (05 Jan 2020 08:38), Max: 36.7 (04 Jan 2020 18:15)  T(F): 98.1 (05 Jan 2020 08:38), Max: 98.1 (04 Jan 2020 22:30)  HR: 83 (05 Jan 2020 08:38) (70 - 86)  BP: 127/72 (05 Jan 2020 08:38) (123/48 - 141/75)  BP(mean): --  RR: 18 (05 Jan 2020 08:38) (18 - 18)  SpO2: 98% (05 Jan 2020 08:32) (98% - 99%)    PHYSICAL EXAM-  GENERAL: awake and alert   NECK: Supple, No JVD   CHEST/LUNG: bilateral diffise rhonchi , coarse BS   HEART: Regular rate and rhythm; S1/s2 No murmurs, rubs, or gallops  ABDOMEN: Soft, Nontender, Nondistended; Bowel sounds present  EXTREMITIES:  No clubbing, bilateral  heel protector in place     < from: Xray Chest 1 View- PORTABLE-Urgent (01.03.20 @ 18:04) >    IMPRESSION: NG tube in stomach.  RIGHT perihilar airspace consolidation.          < end of copied text >  < from: US Vessel Mapping Hemodialysis (01.05.20 @ 11:59) >    IMPRESSION:  1.  Nonocclusive deep vein thrombosis in one of the duplicated left brachial veins surrounding a catheter.  2.  No evidence of deep vein thrombosis in the right upper extremity.  3.  Superficial venous thrombosis involving the right basilic vein in the upper arm and the left cephalic vein in the upper arm.  4.  Cephalic and basilic vein measurements as described.       < end of copied text >

## 2020-01-05 NOTE — PROGRESS NOTE ADULT - SUBJECTIVE AND OBJECTIVE BOX
HPI/OVERNIGHT EVENTS: Patient seen and examined at bedside. She is s/p right IJ permacath placement Friday morning. She has no acute complaints at time of my exam. Minimal pain around permacath insertion site for which she has not felt the need to ask for medications. Patient denies b/l extremity numbness / tingling. Denies fever, chills, CP, or SOB at this time. Patient tolerated HD via permcath last night.     MEDICATIONS  (STANDING):  albuterol/ipratropium for Nebulization. 3 milliLiter(s) Nebulizer every 6 hours  chlorhexidine 2% Cloths 1 Application(s) Topical <User Schedule>  chlorhexidine 4% Liquid 1 Application(s) Topical <User Schedule>  collagenase Ointment 1 Application(s) Topical daily  dextrose 5%. 1000 milliLiter(s) (50 mL/Hr) IV Continuous <Continuous>  dextrose 50% Injectable 12.5 Gram(s) IV Push once  dextrose 50% Injectable 25 Gram(s) IV Push once  dextrose 50% Injectable 25 Gram(s) IV Push once  epoetin sara Injectable 90390 Unit(s) IV Push <User Schedule>  insulin lispro (HumaLOG) corrective regimen sliding scale   SubCutaneous every 6 hours  lactulose Retention Enema 200 Gram(s) Rectal two times a day  pantoprazole  Injectable 40 milliGRAM(s) IV Push two times a day    MEDICATIONS  (PRN):  acetaminophen  Suppository .. 650 milliGRAM(s) Rectal every 6 hours PRN Temp greater or equal to 38C (100.4F), Mild Pain (1 - 3)  dextrose 40% Gel 15 Gram(s) Oral once PRN Blood Glucose LESS THAN 70 milliGRAM(s)/deciLiter  glucagon  Injectable 1 milliGRAM(s) IntraMuscular once PRN Glucose <70 milliGRAM(s)/deciLiter  morphine  - Injectable 1 milliGRAM(s) SubCutaneous every 4 hours PRN Moderate Pain (4 - 6)  sodium chloride 0.9% lock flush 10 milliLiter(s) IV Push every 1 hour PRN Pre/post blood products, medications, blood draw, and to maintain line patency  traMADol 25 milliGRAM(s) Oral every 6 hours PRN Moderate Pain (4 - 6)      Vital Signs Last 24 Hrs  T(C): 36.7 (04 Jan 2020 22:30), Max: 36.7 (04 Jan 2020 18:15)  T(F): 98.1 (04 Jan 2020 22:30), Max: 98.1 (04 Jan 2020 22:30)  HR: 78 (04 Jan 2020 22:30) (70 - 92)  BP: 141/75 (04 Jan 2020 22:30) (102/62 - 141/75)  BP(mean): --  RR: 18 (04 Jan 2020 22:30) (18 - 18)  SpO2: 98% (04 Jan 2020 22:30) (94% - 99%)      Constitutional: patient appears comfortable lying in bed, in no acute distress  Respiratory: on bipap, respirations appear unlabored, no accessory muscle use, no conversational dyspnea  Cardiovascular: regular rate & rhythm  GI: abd soft, NT, ND, receiving tube feeds via NGT  Vascular: dressing overlying right IJ permacath site is C/D/I with no drainage or surrounding erythema. RUE compartments soft, distal sensation grossly intact, palpable right radial pulse      I&O's Detail    03 Jan 2020 07:01  -  04 Jan 2020 07:00  --------------------------------------------------------  IN:    Glucerna 1.5: 140 mL  Total IN: 140 mL    OUT:  Total OUT: 0 mL    Total NET: 140 mL      04 Jan 2020 07:01  -  05 Jan 2020 02:28  --------------------------------------------------------  IN:  Total IN: 0 mL    OUT:    Other: 1700 mL  Total OUT: 1700 mL    Total NET: -1700 mL          LABS:                        8.7    9.77  )-----------( 178      ( 03 Jan 2020 07:59 )             26.6     01-03    140  |  99  |  21.0<H>  ----------------------------<  113  3.7   |  24.0  |  2.29<H>    Ca    7.8<L>      03 Jan 2020 07:59  Phos  3.7     01-03  Mg     1.9     01-03      PT/INR - ( 03 Jan 2020 07:59 )   PT: 16.7 sec;   INR: 1.44 ratio         PTT - ( 03 Jan 2020 07:59 )  PTT:31.2 sec

## 2020-01-05 NOTE — PROGRESS NOTE ADULT - ASSESSMENT
63 yo female with hx of CHFpEF, CAD, CKD, HTN, DM, cirrhosis, portal gastropathy, recurrent GI bleed, blind, sacral decub ulcer, presented to the ED from nursing home with encephalopathy, hypoxic resp failure requiring bipap, in ED found to have anemia with Hb 5 that responded to blood transfusions (hx of recurrent GI bleed for which multiple EGDs/colonoscopies have been performed, no further invasive work up as per GI), sepsis secondary to PNA on abx, KEITH on CKD now requiring HD via left IJ. tunnelled cath palced for access on 1/3/20. PT failed s/s and is NPO. NGT feeding was started  and family to make decisions over the weekend. multiple family conversations ongoing    1- Unstageable Sacral Decub  s/p debridement bedside x 2   Surg plans debridement in OR  However family has their concerns about GA and are refusing same for now  Even when Spinal anesthesia was offered today family adamantly refused OR procedure  Surgery signed off  2- Right pneumonia - ? aspiartion   will order bblood cx . start zosyn x5 days   add neb therapy , chest vest to mobilize secretion   keep head elevated all the times 30-45 %     3- Patient lacks medical decision making capacity  family finally agrees with this and they are willing to make the decision  But HCP 1 dgtr and HCP 2 pt's sister stated that they will get together this weekend and make decisions       4-Oropharengeal dysphagia   Currently with NGT feeding   family decison re goal of care and feeding tube insertion if no improvement is pending   Family wants aggressive suctioning of throat and they want to see if that improves her swallowing capacity  Freq suctioning ordered    5-s/p Metabolic vs Uremic vs Hepatic Encephalopathy  Likely multifactorial - Hepatic, Uremic. Uremia has resolved. Hepatic- responds well to lactulose  CT head negative, EEG without seizures      6-Acute on chronic diastolic CHF/ Acute hypoxic respiratory failure  resolving   Sec to pulm edema  No longer requiring bipap and now weaned off Hi yazmin O2 and tolerating nasal cannula  Continue to taper supplemental NC O2 as able    7-Acute blood loss Anemia   Hx of recurrent GI bleed? prior EGDs/ colonoscopies this year which were negative, prior CTA with +transverse colon bleed  Seen by GI on this admission, no invasive intervention   Responded to blood transfusions   No signs of active bleeding at this time.    Protonix, octreotide- total 5 days through 12/30/19 completed      8-Known CASTAÑEDA Cirrhosis with portal gastropathy and AVMs  Lactulose and rifaximin  Octreotide, PPI    9-KEITH/ATN on CKD  Likely sec to blood loss  Was briefly on dialysis in the past  Renal US showed no hydronephrosis    Discussed w Dr Castro - pt will likely need long term HD, eventual plan for PermCath / AVF seen by [facility] surgery team  mapping and tentative plan for permacath thursday, holding off on AVF planning for now  s/p tunnelled cathter placement by Vas con 1/3/20    10-Unstageable sacral decub debrided x 2 by surgical team,  Cont wound care  family wants to get debridment if needed   d/w acute surgery team     DM2  Well controlled  Cont current regimen      need palliative care follow up 63 yo female with hx of CHFpEF, CAD, CKD, HTN, DM, cirrhosis, portal gastropathy, recurrent GI bleed, blind, sacral decub ulcer, presented to the ED from nursing home with encephalopathy, hypoxic resp failure requiring bipap, in ED found to have anemia with Hb 5 that responded to blood transfusions (hx of recurrent GI bleed for which multiple EGDs/colonoscopies have been performed, no further invasive work up as per GI), sepsis secondary to PNA on abx, KEITH on CKD now requiring HD via left IJ. tunnelled cath palced for access on 1/3/20. PT failed s/s and is NPO. NGT feeding was started  and family to make decisions over the weekend. multiple family conversations ongoing    1- Unstageable Sacral Decub  s/p debridement bedside x 2   Surg plans debridement in OR  However family has their concerns about GA and are refusing same for now  Even when Spinal anesthesia was offered today family adamantly refused OR procedure  Surgery signed off  2- Right pneumonia - ? aspiartion   will order bblood cx . start zosyn x5 days   add neb therapy , chest vest to mobilize secretion   keep head elevated all the times 30-45 %     3- bilateral superficial vein thrombosis on US doppler of upper ext   d/w vascular  they will reevaluate with attending team     4-Oropharengeal dysphagia   Currently with NGT feeding   family decison re goal of care and feeding tube insertion if no improvement is pending   Family wants aggressive suctioning of throat and they want to see if that improves her swallowing capacity  Freq suctioning ordered    5-s/p Metabolic vs Uremic vs Hepatic Encephalopathy  Likely multifactorial - Hepatic, Uremic. Uremia has resolved. Hepatic- responds well to lactulose  CT head negative, EEG without seizures      6-Acute on chronic diastolic CHF/ Acute hypoxic respiratory failure  resolving   Sec to pulm edema  No longer requiring bipap and now weaned off Hi yazmin O2 and tolerating nasal cannula  Continue to taper supplemental NC O2 as able    7-Acute blood loss Anemia   Hx of recurrent GI bleed? prior EGDs/ colonoscopies this year which were negative, prior CTA with +transverse colon bleed  Seen by GI on this admission, no invasive intervention   Responded to blood transfusions   No signs of active bleeding at this time.    Protonix, octreotide- total 5 days through 12/30/19 completed      8-Known CASTAÑEDA Cirrhosis with portal gastropathy and AVMs  Lactulose and rifaximin  Octreotide, PPI    9-KEITH/ATN on CKD  Likely sec to blood loss  Was briefly on dialysis in the past  Renal US showed no hydronephrosis  had permacath now being evaluated for AVF by vascular surgery     Discussed w Dr Castro - pt will likely need long term HD,    10-Unstageable sacral decub - debrided x 2 by surgical team,  Cont wound care  family wants to get debridment if needed   d/w acute surgery team     11-DM2  Well controlled  Cont current regimen      need palliative care follow up

## 2020-01-06 LAB
GLUCOSE BLDC GLUCOMTR-MCNC: 125 MG/DL — HIGH (ref 70–99)
GLUCOSE BLDC GLUCOMTR-MCNC: 167 MG/DL — HIGH (ref 70–99)
GLUCOSE BLDC GLUCOMTR-MCNC: 191 MG/DL — HIGH (ref 70–99)
GLUCOSE BLDC GLUCOMTR-MCNC: 230 MG/DL — HIGH (ref 70–99)
GLUCOSE BLDC GLUCOMTR-MCNC: 260 MG/DL — HIGH (ref 70–99)

## 2020-01-06 PROCEDURE — 99232 SBSQ HOSP IP/OBS MODERATE 35: CPT

## 2020-01-06 PROCEDURE — 71045 X-RAY EXAM CHEST 1 VIEW: CPT | Mod: 26

## 2020-01-06 PROCEDURE — 74230 X-RAY XM SWLNG FUNCJ C+: CPT | Mod: 26

## 2020-01-06 PROCEDURE — 99233 SBSQ HOSP IP/OBS HIGH 50: CPT

## 2020-01-06 RX ADMIN — HEPARIN SODIUM 5000 UNIT(S): 5000 INJECTION INTRAVENOUS; SUBCUTANEOUS at 06:23

## 2020-01-06 RX ADMIN — PIPERACILLIN AND TAZOBACTAM 25 GRAM(S): 4; .5 INJECTION, POWDER, LYOPHILIZED, FOR SOLUTION INTRAVENOUS at 06:24

## 2020-01-06 RX ADMIN — HEPARIN SODIUM 5000 UNIT(S): 5000 INJECTION INTRAVENOUS; SUBCUTANEOUS at 23:24

## 2020-01-06 RX ADMIN — MORPHINE SULFATE 1 MILLIGRAM(S): 50 CAPSULE, EXTENDED RELEASE ORAL at 20:34

## 2020-01-06 RX ADMIN — Medication 3 MILLILITER(S): at 03:07

## 2020-01-06 RX ADMIN — PANTOPRAZOLE SODIUM 40 MILLIGRAM(S): 20 TABLET, DELAYED RELEASE ORAL at 18:16

## 2020-01-06 RX ADMIN — PIPERACILLIN AND TAZOBACTAM 25 GRAM(S): 4; .5 INJECTION, POWDER, LYOPHILIZED, FOR SOLUTION INTRAVENOUS at 18:41

## 2020-01-06 RX ADMIN — PANTOPRAZOLE SODIUM 40 MILLIGRAM(S): 20 TABLET, DELAYED RELEASE ORAL at 06:24

## 2020-01-06 RX ADMIN — ERYTHROPOIETIN 10000 UNIT(S): 10000 INJECTION, SOLUTION INTRAVENOUS; SUBCUTANEOUS at 00:18

## 2020-01-06 RX ADMIN — Medication 2: at 18:16

## 2020-01-06 RX ADMIN — Medication 1 APPLICATION(S): at 06:23

## 2020-01-06 RX ADMIN — Medication 3 MILLILITER(S): at 15:51

## 2020-01-06 RX ADMIN — Medication 3 MILLILITER(S): at 22:06

## 2020-01-06 RX ADMIN — Medication 2: at 06:24

## 2020-01-06 RX ADMIN — LACTULOSE 200 GRAM(S): 10 SOLUTION ORAL at 06:24

## 2020-01-06 RX ADMIN — LACTULOSE 200 GRAM(S): 10 SOLUTION ORAL at 18:17

## 2020-01-06 RX ADMIN — Medication 4: at 23:26

## 2020-01-06 RX ADMIN — CHLORHEXIDINE GLUCONATE 1 APPLICATION(S): 213 SOLUTION TOPICAL at 06:25

## 2020-01-06 RX ADMIN — HEPARIN SODIUM 5000 UNIT(S): 5000 INJECTION INTRAVENOUS; SUBCUTANEOUS at 18:16

## 2020-01-06 RX ADMIN — CHLORHEXIDINE GLUCONATE 1 APPLICATION(S): 213 SOLUTION TOPICAL at 06:23

## 2020-01-06 RX ADMIN — Medication 3 MILLILITER(S): at 09:41

## 2020-01-06 RX ADMIN — MORPHINE SULFATE 1 MILLIGRAM(S): 50 CAPSULE, EXTENDED RELEASE ORAL at 19:56

## 2020-01-06 RX ADMIN — Medication 6: at 00:18

## 2020-01-06 NOTE — SWALLOW VFSS/MBS ASSESSMENT ADULT - LARYNGEAL PENETRATION AFTER THE SWALLOW - SILENT
Mild Trace/on vallecular/pyriform stasis Trace/on pharyngeal stasis with attempt at subsequent swallow

## 2020-01-06 NOTE — PROGRESS NOTE ADULT - SUBJECTIVE AND OBJECTIVE BOX
[facility] DIVISION OF KIDNEY DISEASES AND HYPERTENSION -- HEMODIALYSIS NOTE  --------------------------------------------------------------------------------  Chief Complaint: ESRD/Ongoing hemodialysis requirement    24 hour events/subjective:  No acute events  Pt seen and examined with pt's spouse at bedside  No acute complaint     PAST HISTORY  --------------------------------------------------------------------------------  No significant changes to PMH, PSH, FHx, SHx, unless otherwise noted    ALLERGIES & MEDICATIONS  --------------------------------------------------------------------------------  Allergies    ACE inhibitors (Other (Mild))    Intolerances    oxycodone (Sedation/Somnol)    Standing Inpatient Medications  albuterol/ipratropium for Nebulization. 3 milliLiter(s) Nebulizer every 6 hours  chlorhexidine 2% Cloths 1 Application(s) Topical <User Schedule>  chlorhexidine 4% Liquid 1 Application(s) Topical <User Schedule>  collagenase Ointment 1 Application(s) Topical daily  dextrose 5%. 1000 milliLiter(s) IV Continuous <Continuous>  dextrose 50% Injectable 12.5 Gram(s) IV Push once  dextrose 50% Injectable 25 Gram(s) IV Push once  dextrose 50% Injectable 25 Gram(s) IV Push once  epoetin sara Injectable 62600 Unit(s) IV Push <User Schedule>  heparin  Injectable 5000 Unit(s) SubCutaneous every 8 hours  insulin lispro (HumaLOG) corrective regimen sliding scale   SubCutaneous every 6 hours  lactulose Retention Enema 200 Gram(s) Rectal two times a day  pantoprazole  Injectable 40 milliGRAM(s) IV Push two times a day  piperacillin/tazobactam IVPB.. 3.375 Gram(s) IV Intermittent every 12 hours    PRN Inpatient Medications  acetaminophen  Suppository .. 650 milliGRAM(s) Rectal every 6 hours PRN  dextrose 40% Gel 15 Gram(s) Oral once PRN  glucagon  Injectable 1 milliGRAM(s) IntraMuscular once PRN  morphine  - Injectable 1 milliGRAM(s) SubCutaneous every 4 hours PRN  sodium chloride 0.9% lock flush 10 milliLiter(s) IV Push every 1 hour PRN  traMADol 25 milliGRAM(s) Oral every 6 hours PRN      REVIEW OF SYSTEMS  --------------------------------------------------------------------------------  Gen: No weight changes, fatigue, fevers/chills, weakness  Skin: No rashes  Head/Eyes/Ears/Mouth: No headache  Respiratory: No dyspnea, cough,  CV: No chest pain, orthopnea  GI: No abdominal pain, diarrhea, constipation, nausea, vomiting,  MSK: No joint pain  Neuro: No dizziness/lightheadedness, weakness  Heme: No bleeding  Psych: No significant nervousness, anxiety, stress, depression    All other systems were reviewed and are negative, except as noted.    VITALS/PHYSICAL EXAM  --------------------------------------------------------------------------------  T(C): 36.8 (01-06-20 @ 08:38), Max: 36.8 (01-06-20 @ 08:38)  HR: 74 (01-06-20 @ 08:38) (74 - 86)  BP: 106/64 (01-06-20 @ 08:38) (106/64 - 138/79)  RR: 18 (01-06-20 @ 08:38) (18 - 18)  SpO2: 96% (01-06-20 @ 03:07) (96% - 99%)  Wt(kg): --        01-05-20 @ 07:01  -  01-06-20 @ 07:00  --------------------------------------------------------  IN: 0 mL / OUT: 0 mL / NET: 0 mL      Physical Exam:              Gen: NAD  	HEENT; Rt eye blindness  	Pulm: CTA B/L  	CV: RRR, S1S2; no rub  	Abd: +BS, soft, nontender  	UE: Warm, intact strength; no asterixis  	LE: Warm, no edema  	Neuro: No focal deficits  	Psych: calm, normal affect  	Skin: Warm  	Vascular access: RIJ tunneled HD catheter+    	  LABS/STUDIES  -------------------------------------------------------------------------------    Iron 25, TIBC 154, %sat 16      [01-02-20 @ 09:39]  Ferritin 668      [01-02-20 @ 09:39]  PTH -- (Ca 8.0)      [01-02-20 @ 17:06]   119  PTH -- (Ca 9.1)      [05-30-19 @ 18:09]   107  PTH -- (Ca 7.9)      [01-17-19 @ 16:26]   175  PTH -- (Ca 8.1)      [01-16-19 @ 16:55]   148  Vitamin D (25OH) 25.2      [01-02-20 @ 17:06]  HbA1c 5.8      [12-26-19 @ 09:04]  TSH 1.10      [05-17-19 @ 12:21]    HBsAb 18.0      [12-26-19 @ 22:01]  HBsAg Nonreact      [12-26-19 @ 22:01]  HBcAb Nonreact      [12-26-19 @ 22:01]  HCV 0.20, Nonreact      [12-26-19 @ 22:01]

## 2020-01-06 NOTE — PROGRESS NOTE ADULT - ASSESSMENT
This  63 y/o F with a h/o dCHF, moderate pHTN, CAD, CKD, HTN, DM, cirrhosis, portal gastropathy, recurrent GIB, blind, large sacral wound, presents to the ED from NH with AMS, hypoxemia, and hypotension. Patient had large melenotic BM in the ED. H&H: 5.8/17. Lactate: 2.7. KEITH on CKD. Hyperkalemic. Volume resuscitation began with 3u PRBC. Patient minimally responsive and moaning in pain. UA(+) with thick tan urine in fontenot. Developed progressive respiratory distress and hypoxemia requiring NIPPV. (26 Dec 2019 03:58)    Patient was managed by medical team for Encephalopathy, which was improving, thought to be multifactorial- Hepatic, Uremic. CT head negative, EEG without seizures   She also had  acute on chronic diastolic CHF/ Acute hypoxic respiratory failure.  For her acute blood loss Anemia , she responded to blood transfusion, no signs of active bleeding at this time.  She is on Protonix, octreotide- total 5 days through 12/30/19, abx for SBP prophylaxis.  She has known CASTAÑEDA Cirrhosis with portal gastropathy and AVMs, on Lactulose and rifaximin, followed by GI team.  For her KEITH/ATN on CKD, she has been getting intermittent dialysis in hospital.     During initial part of hospital stay, she had fever of T100 and is being treated for sepsis.  Her Ucx was negative and her Blood cx was also negative .  There was a right sided infiltrate on CXR, was on zosyn at nursing home being treated for the same.  Completed course of meropenem x 5 days, 1/1/2020.      Now called back on 1/6/2020 for SOB, possible aspiration PNA    Impression:  recent PNA at SNF, then treated  now called for SOB, possible PNA    - agree with Zosyn.   no labs since 1/3/2020; repeat labs ordered - CBC BMP, stat Pro-BNP      - follow up all outstanding cultures  - trend temperature and WBC curve  - repeat cultures from blood and all sources if febrile.

## 2020-01-06 NOTE — SWALLOW VFSS/MBS ASSESSMENT ADULT - ROSENBEK'S PENETRATION ASPIRATION SCALE
Problem: Adult Behavioral Health Plan of Care  Goal: Plan of Care Review  Outcome: Ongoing (interventions implemented as appropriate)  Received pt. In dayroom at start of the shift, mood and affect noted to be irritable, depressed, preoccupied. Pt. Up at nursing station with c/o hip/leg pain and received Tylenol 650 mg po at 2030 with moderate relief. Pt. Showered this shift and noted talking on phone. Dismissive of one to one. Denies self harm thoughts at this time.  Pt.  Slept 7.5  Hours  so far this shift without problems noted. q 15 min safety checks done this shift. Safety and comfort maintained. Cont. Plan.       (1) no aspiration, contrast does not enter airway (5) contrast contacts vocal cords, visible residue remains (penetration) (8) contrast passes glottis, visible subglottic residue remains, absent patient response (aspiration) (3) contrast remains above the vocal cords, visible residue remains (penetration)

## 2020-01-06 NOTE — CHART NOTE - NSCHARTNOTEFT_GEN_A_CORE
Called at 23:00. 2 phlebotomist unable to draw labs from 13:00. Left arm pink banded for vein mapping.  unable to draw labs due to poor peripheral access. Sono used . PA unable to draw labs. Hospitalist notified Called at 23:00. 2 phlebotomist unable to draw labs from 13:00. Left arm pink banded for vein mapping.  unable to draw labs due to poor peripheral access. Sono used . PA unable to draw labs. Pt afebrile. Last WBC WNL. Hospitalist notified

## 2020-01-06 NOTE — PROGRESS NOTE ADULT - ASSESSMENT
63 yo female with hx of CHFpEF, CAD, CKD, HTN, DM, cirrhosis, portal gastropathy, recurrent GI bleed, blind, sacral decub ulcer, presented to the ED from nursing home with encephalopathy, hypoxic resp failure requiring bipap, in ED found to have anemia with Hb 5 that responded to blood transfusions (hx of recurrent GI bleed for which multiple EGDs/colonoscopies have been performed, no further invasive work up as per GI), sepsis secondary to PNA on abx, KEITH on CKD now requiring HD via left IJ. tunnelled cath palced for access on 1/3/20. PT failed s/s and is NPO. NGT feeding was started  and family to make decisions over the weekend. multiple family conversations ongoing    1- Unstageable Sacral Decub  s/p debridement bedside x 2   Surg plans debridement in OR  However family has their concerns about GA and are refusing same for now  Even when Spinal anesthesia was offered today family adamantly refused OR procedure  Surgery contacted yesterday re possible debridment since daughter decided to go ahead with possible surgery     2- Right pneumonia - suspected  aspiration with NGT   MBS NPO alternative options for nutrional and fluid  intake   ID recalled to evaluate   zosyn restarted   Pt is high risk for recurrent aspiration   add neb therapy , chest vest to mobilize secretion   keep head elevated all the times 30-45 %   d/w daughter re risk of aspiration even with NGT or PEG   she will discuss with her aunt however leaning toward PEG as needed     3- bilateral superficial vein thrombosis on US doppler of upper ext   d/w vascular  they will reevaluate with attending team   rec anticaogualtion due to GI bleed will hold off   Gi recalled   repeat HB ordered not done if stable may get iv heparin trial       4-Oropharengeal dysphagia   Currently with NGT feeding   family decison re PEG , daughter wants the feeding tube     5-s/p Metabolic vs Uremic vs Hepatic Encephalopathy  Likely multifactorial - Hepatic, Uremic. Uremia has resolved. Hepatic- responds well to lactulose  CT head negative, EEG without seizures      6-Acute on chronic diastolic CHF/ Acute hypoxic respiratory failure  Sec to pulm edema  No longer requiring bipap and now weaned off Hi yazmin O2 and tolerating nasal cannula  Continue to taper supplemental NC O2 as able    7-Acute blood loss Anemia   Hx of recurrent GI bleed? prior EGDs/ colonoscopies this year which were negative, prior CTA with +transverse colon bleed  Seen by GI on this admission, no invasive intervention   Responded to blood transfusions   No signs of active bleeding at this time.    Protonix, octreotide- total 5 days through 12/30/19 completed      8-Known CASTAÑEDA Cirrhosis with portal gastropathy and AVMs  Lactulose and rifaximin  Octreotide, PPI    9-KEITH/ATN on CKD  Likely sec to blood loss  Was briefly on dialysis in the past  Renal US showed no hydronephrosis  had permacath now being evaluated for AVF by vascular surgery     Discussed w Dr Castro - pt will likely need long term HD,    10-DM2- cont sliding scale insulin coverage   Well controlled  Cont current regimen

## 2020-01-06 NOTE — PROGRESS NOTE ADULT - SUBJECTIVE AND OBJECTIVE BOX
[facility]  INFECTIOUS DISEASES AND INTERNAL MEDICINE at [place]  =======================================================  Matt Jackson MD  Diplomates American Board of Internal Medicine and Infectious Diseases  Telephone [phone]  Fax            [phone]  =======================================================    [id number]  KUN ROCHA   called back by medical team for worsened right infiltrate    patient noted with increased SOB  had been on tube feeds via nasal feeding tube.     =======================================================     REVIEW OF SYSTEMS:  Limited due to medical condition    =======================================================  Allergies  ACE inhibitors (Other (Mild))  oxycodone (Sedation/Somnol)      Antibiotics:  piperacillin/tazobactam IVPB.. 3.375 Gram(s) IV Intermittent every 12 hours    Other medications:  albuterol/ipratropium for Nebulization. 3 milliLiter(s) Nebulizer every 6 hours  chlorhexidine 2% Cloths 1 Application(s) Topical <User Schedule>  chlorhexidine 4% Liquid 1 Application(s) Topical <User Schedule>  collagenase Ointment 1 Application(s) Topical daily  dextrose 5%. 1000 milliLiter(s) IV Continuous <Continuous>  dextrose 50% Injectable 12.5 Gram(s) IV Push once  dextrose 50% Injectable 25 Gram(s) IV Push once  dextrose 50% Injectable 25 Gram(s) IV Push once  epoetin sara Injectable 97177 Unit(s) IV Push <User Schedule>  heparin  Injectable 5000 Unit(s) SubCutaneous every 8 hours  insulin lispro (HumaLOG) corrective regimen sliding scale   SubCutaneous every 6 hours  lactulose Retention Enema 200 Gram(s) Rectal two times a day  pantoprazole  Injectable 40 milliGRAM(s) IV Push two times a day    ======================================================  Physical Exam:  ============  T(F): 98.3 (06 Jan 2020 08:38), Max: 98.3 (06 Jan 2020 08:38)  HR: 74 (06 Jan 2020 08:38)  BP: 106/64 (06 Jan 2020 08:38)  RR: 18 (06 Jan 2020 08:38)  SpO2: 96% (06 Jan 2020 03:07) (96% - 99%)  temp max in last 48H T(F): , Max: 98.3 (01-06-20 @ 08:38)    General:  No acute distress.  OBESE  Eye: Pupils are equal, round and reactive to light, Extraocular movements are intact, opacified right eye  HENT: Normocephalic, + nasal feeding tube  + NEBS mask  Neck: Supple, No lymphadenopathy.  + RIGHT neck HD catheter  Respiratory: Lungs with decreased BS at bases.   Cardiovascular: Normal rate, Regular rhythm,   + ANASARCA  Gastrointestinal: Soft, Non-tender, Non-distended, Normal bowel sounds.  Genitourinary: No costovertebral angle tenderness.  Lymphatics: No lymphadenopathy neck,   Musculoskeletal: no joint abnl  Integumentary: No rash.  Neurologic: awake, tracks  Psychiatric: not evaluated.    =======================================================  Labs:      WBC Count: 9.77 K/uL (01-03-20 @ 07:59)      Culture - Urine (collected 12-26-19 @ 02:26)  Source: .Urine  Final Report (12-27-19 @ 12:00):    Culture grew 3 or more types of organisms which indicate    collection contamination; consider recollection only if clinically    indicated.    ,    TYPE: (C=Critical, N=Notification, A=Abnormal) n    TESTS:  _ urine contamination    DATE/TIME CALLED: _ 12/27/2019 11:59:55    CALLED TO: Anais meza rn    READ BACK (2 Patient Identifiers)(Y/N): _ y    READ BACK VALUES (Y/N): _ y    CALLED BY: Anais sanchez    Culture - Blood (collected 12-26-19 @ 00:45)  Source: .Blood  Final Report (12-31-19 @ 01:00):    No growth at 5 days.    Culture - Blood (collected 12-26-19 @ 00:45)  Source: .Blood  Final Report (12-31-19 @ 01:00):    No growth at 5 days.      < from: Xray Chest 1 View- PORTABLE-Urgent (01.06.20 @ 09:56) >     EXAM:  XR CHEST PORTABLE URGENT 1V                          PROCEDURE DATE:  01/06/2020          INTERPRETATION:  EXAM:XR CHEST URGENT.     CLINICAL INDICATION: r/o aspiration ngt in place .     TECHNIQUE: Portable upright AP view.     PRIOR EXAM:01/03/2020.     FINDINGS:      Interval progression of right mid and lower lung infiltrate. Visualized left lung remains clear. Magnified cardiac silhouette is stable and is probably enlarged. There is evidence of a prior sternotomy and mediastinal surgery. Coronary artery stent is identified on the left side. A monitoring device projects over the left cardiac silhouette. An NG tube extending into the stomach and a right-sided central line remain in place. Surgical clips are evident in the right lower neck.    IMPRESSION:   Interval progression of right lung infiltrate.  Additional findings as above.      JEREMI VALIENTE M.D., ATTENDING RADIOLOGIST  This document has been electronically signed. Jan 6 2020 10:39AM    < end of copied text >

## 2020-01-06 NOTE — CHART NOTE - NSCHARTNOTEFT_GEN_A_CORE
Source: Patient [ ]  Family [ ]   other [x ] sleeping, EMR    Current Diet: NPO with TF        Enteral /Parenteral Nutrition: TF to be initiated as family/ pt wishes.     Current Weight: 1/4 178#, 1/2 187# (12/29) 198.6#  (12/28) 214.5#  (12/27) 209.8#  (12/26) 214.9#  -Question accuracy of weights, will continue to monitor. Weight fluctuations could be due to fluid fluctuations, pt receiving HD. Generalized moderate edema noted per EMR.      % Weight Change     Pertinent Medications: MEDICATIONS  (STANDING):  albuterol/ipratropium for Nebulization. 3 milliLiter(s) Nebulizer every 6 hours  chlorhexidine 2% Cloths 1 Application(s) Topical <User Schedule>  chlorhexidine 4% Liquid 1 Application(s) Topical <User Schedule>  collagenase Ointment 1 Application(s) Topical daily  dextrose 5%. 1000 milliLiter(s) (50 mL/Hr) IV Continuous <Continuous>  dextrose 50% Injectable 12.5 Gram(s) IV Push once  dextrose 50% Injectable 25 Gram(s) IV Push once  dextrose 50% Injectable 25 Gram(s) IV Push once  epoetin sara Injectable 38744 Unit(s) IV Push <User Schedule>  heparin  Injectable 5000 Unit(s) SubCutaneous every 8 hours  insulin lispro (HumaLOG) corrective regimen sliding scale   SubCutaneous every 6 hours  lactulose Retention Enema 200 Gram(s) Rectal two times a day  pantoprazole  Injectable 40 milliGRAM(s) IV Push two times a day  piperacillin/tazobactam IVPB.. 3.375 Gram(s) IV Intermittent every 12 hours    MEDICATIONS  (PRN):  acetaminophen  Suppository .. 650 milliGRAM(s) Rectal every 6 hours PRN Temp greater or equal to 38C (100.4F), Mild Pain (1 - 3)  dextrose 40% Gel 15 Gram(s) Oral once PRN Blood Glucose LESS THAN 70 milliGRAM(s)/deciLiter  glucagon  Injectable 1 milliGRAM(s) IntraMuscular once PRN Glucose <70 milliGRAM(s)/deciLiter  morphine  - Injectable 1 milliGRAM(s) SubCutaneous every 4 hours PRN Moderate Pain (4 - 6)  sodium chloride 0.9% lock flush 10 milliLiter(s) IV Push every 1 hour PRN Pre/post blood products, medications, blood draw, and to maintain line patency  traMADol 25 milliGRAM(s) Oral every 6 hours PRN Moderate Pain (4 - 6)    Pertinent Labs:         Skin:  R heel wound, Unstageable sacrum         Nutrition focused physical exam conducted - found signs of malnutrition [x ]absent [ ]present    Subcutaneous fat loss: [ ] Orbital fat pads region, [ ]Buccal fat region, [ ]Triceps region,  [ ]Ribs region    Muscle wasting: [ ]Temples region, [ ]Clavicle region, [ ]Shoulder region, [ ]Scapula region, [ ]Interosseous region,  [ ]thigh region, [ ]Calf region    Estimated Needs:   [ x] no change since previous assessment  [ ] recalculated:     Current Nutrition Diagnosis: Pt remains at high nutrition risk and meets criteria for moderate (chronic) malnutrition related to inability to consume increased protein energy intake while NPO in setting of unstageable sacral wound, hypovolemic shock for possible GI bleed/blood loss anemia as evidenced by pt meeting <75% estimated energy intake > 1 month and moderate 3+ edema. Diarrhea noted per EMR 12/29, BM noted 12/30. Pt was lethargic during assessment. RD to remain available.  Pt failed speech and swallow eval and is NPO. NPO with TF order noted however no specific TF actually ordered,  and family to make decisions over the weekend. Multiple family conversations ongoing with MD.    Recommendations:     1)initiate TF Nepro at 40cchr and advance to 60cchr to provide 1200cc, 2160kcal, 97gr pro  2) Monitor weights and labs  3) RX: Neprovite and vitamin C (500mg) daily          Monitoring and Evaluation:   [ ] PO intake [ x] Tolerance to diet prescription [X] Weights  [X] Follow up per protocol [X] Labs:

## 2020-01-06 NOTE — PROGRESS NOTE ADULT - SUBJECTIVE AND OBJECTIVE BOX
follow up for  large decub, oropharyngeal dysphagia  chart reviewed , pt is seen examined earlier today   pt is sleeping with worsening congestion concern  about possible aspiration m MBS completed   , she is with c/o lower back pain   + cough congestion noted     Vital Signs Last 24 Hrs  T(C): 37.1 (06 Jan 2020 15:49), Max: 37.1 (06 Jan 2020 15:49)  T(F): 98.7 (06 Jan 2020 15:49), Max: 98.7 (06 Jan 2020 15:49)  HR: 89 (06 Jan 2020 15:49) (74 - 89)  BP: 113/71 (06 Jan 2020 15:49) (106/64 - 138/79)  BP(mean): --  RR: 18 (06 Jan 2020 15:49) (18 - 18)  SpO2: 95% (06 Jan 2020 15:49) (95% - 99%)    PHYSICAL EXAM-  GENERAL: awake and alert , lethargic   CHEST/LUNG: bilateral diffuse  rhonchi , coarse BS   HEART: Regular rate and rhythm; S1/s2 No murmurs, rubs, or gallops  ABDOMEN: Soft, Nontender, Nondistended; Bowel sounds present  EXTREMITIES:  No clubbing, bilateral  heel protector in place         IMPRESSION:  1.  Nonocclusive deep vein thrombosis in one of the duplicated left brachial veins surrounding a catheter.  2.  No evidence of deep vein thrombosis in the right upper extremity.  3.  Superficial venous thrombosis involving the right basilic vein in the upper arm and the left cephalic vein in the upper arm.  4.  Cephalic and basilic vein measurements as described.       < end of copied text >

## 2020-01-06 NOTE — SWALLOW VFSS/MBS ASSESSMENT ADULT - RESIDUE IN VALLECULAE
Severe/multiple subsequent swallows were not effective and reducing stasis Moderate Moderate/Mild Mild/Moderate Severe/unable to clear despite multple subsequent swallows

## 2020-01-06 NOTE — SWALLOW VFSS/MBS ASSESSMENT ADULT - PHARYNGEAL PHASE COMMENTS
Although no penetration/aspiration of puree observed on study, severity of stasis in vallecula placed pt at significant risk for aspiration  Subsequent trials of honey thickened liquids did help to reduced stasis to moderate, however +penetration of stasis above the cords without clearance observed.

## 2020-01-06 NOTE — GOALS OF CARE CONVERSATION - ADVANCED CARE PLANNING - CONVERSATION DETAILS
Call placed to patient's health care proxy/surrogate Nini Alas (patient's sister) due to being unable to reach Rosa [facility]. During our initial encounter Isabella and Pito defer to Nini to speak to during times they cannot.   - Nini states she flew in from [place] this weekend to be with her sister. She adds that she is helping Pito and Isabella make medical decisions for her sister. She adds over the weekend her sister had a tube placed for feedings "through her nose" (NGT) in the event that she cannot swallow. Discussed with Nini that patient failed speech and swallow today. Nini states she will speak with the family to discuss what they want to do, but at this time is leaning towards starting feeds through the NGT and then retesting her swallow capability.  - Explained to Nini options of Peg vs Comfort feeds/Hospice. Explained the risks involved included with comfort feeds regarding aspirational pneumonia, I explained how having a peg placed doesn't necessarily prevent aspiration or developing aspirational pneumonia. During our conversation Nini sounded guarded with the information and provided no definitive answer moving forward. At this point in time family is requesting to hear directly from primary team to further discuss medical management and next steps in care. Reached out to Dr. Little to inform her to follow up with family. Contact numbers are as follow: [phone] ( ISABELLA) and [phone] (NINI)
Palliative care encounter.    Recommendation:     Met with patient at bedside, able to respond appropriately to name, , locations but not situation.   - Family meeting held at bedside with patient's  Pito Mehta, myself, Dr. King, Cass NOLEN, and patient's daughter Cas Alas.  -Discussed in detail diagnosis, prognosis, plan of care, surgical options, comfort options, as well as answered all questions and concerns family had. At the end of our meeting patient established to REMAIN FULL CODE including CPR and Intubation. Also at this time daughter and  opting to hold off on surgery to decubiti area.  -Family feel that they need more time to further decide on treatment plan and if they want to change code status.   - Will continue to support and monitor

## 2020-01-07 LAB
ANION GAP SERPL CALC-SCNC: 12 MMOL/L — SIGNIFICANT CHANGE UP (ref 5–17)
BUN SERPL-MCNC: 34 MG/DL — HIGH (ref 8–20)
CALCIUM SERPL-MCNC: 8 MG/DL — LOW (ref 8.6–10.2)
CHLORIDE SERPL-SCNC: 99 MMOL/L — SIGNIFICANT CHANGE UP (ref 98–107)
CO2 SERPL-SCNC: 28 MMOL/L — SIGNIFICANT CHANGE UP (ref 22–29)
CREAT SERPL-MCNC: 3.06 MG/DL — HIGH (ref 0.5–1.3)
GLUCOSE BLDC GLUCOMTR-MCNC: 170 MG/DL — HIGH (ref 70–99)
GLUCOSE BLDC GLUCOMTR-MCNC: 171 MG/DL — HIGH (ref 70–99)
GLUCOSE BLDC GLUCOMTR-MCNC: 199 MG/DL — HIGH (ref 70–99)
GLUCOSE BLDC GLUCOMTR-MCNC: 223 MG/DL — HIGH (ref 70–99)
GLUCOSE SERPL-MCNC: 213 MG/DL — HIGH (ref 70–115)
HCT VFR BLD CALC: 23.4 % — LOW (ref 34.5–45)
HGB BLD-MCNC: 8 G/DL — LOW (ref 11.5–15.5)
MCHC RBC-ENTMCNC: 31.5 PG — SIGNIFICANT CHANGE UP (ref 27–34)
MCHC RBC-ENTMCNC: 34.2 GM/DL — SIGNIFICANT CHANGE UP (ref 32–36)
MCV RBC AUTO: 92.1 FL — SIGNIFICANT CHANGE UP (ref 80–100)
NT-PROBNP SERPL-SCNC: HIGH PG/ML (ref 0–300)
PLATELET # BLD AUTO: 196 K/UL — SIGNIFICANT CHANGE UP (ref 150–400)
POTASSIUM SERPL-MCNC: 4.9 MMOL/L — SIGNIFICANT CHANGE UP (ref 3.5–5.3)
POTASSIUM SERPL-SCNC: 4.9 MMOL/L — SIGNIFICANT CHANGE UP (ref 3.5–5.3)
RBC # BLD: 2.54 M/UL — LOW (ref 3.8–5.2)
RBC # FLD: 20.7 % — HIGH (ref 10.3–14.5)
SODIUM SERPL-SCNC: 139 MMOL/L — SIGNIFICANT CHANGE UP (ref 135–145)
WBC # BLD: 8.16 K/UL — SIGNIFICANT CHANGE UP (ref 3.8–10.5)
WBC # FLD AUTO: 8.16 K/UL — SIGNIFICANT CHANGE UP (ref 3.8–10.5)

## 2020-01-07 PROCEDURE — 99233 SBSQ HOSP IP/OBS HIGH 50: CPT

## 2020-01-07 PROCEDURE — 99232 SBSQ HOSP IP/OBS MODERATE 35: CPT

## 2020-01-07 PROCEDURE — 90937 HEMODIALYSIS REPEATED EVAL: CPT

## 2020-01-07 RX ORDER — CEFAZOLIN SODIUM 1 G
2000 VIAL (EA) INJECTION ONCE
Refills: 0 | Status: DISCONTINUED | OUTPATIENT
Start: 2020-01-07 | End: 2020-01-07

## 2020-01-07 RX ADMIN — Medication 2: at 12:44

## 2020-01-07 RX ADMIN — MORPHINE SULFATE 1 MILLIGRAM(S): 50 CAPSULE, EXTENDED RELEASE ORAL at 02:07

## 2020-01-07 RX ADMIN — MORPHINE SULFATE 1 MILLIGRAM(S): 50 CAPSULE, EXTENDED RELEASE ORAL at 21:40

## 2020-01-07 RX ADMIN — MORPHINE SULFATE 1 MILLIGRAM(S): 50 CAPSULE, EXTENDED RELEASE ORAL at 07:46

## 2020-01-07 RX ADMIN — HEPARIN SODIUM 5000 UNIT(S): 5000 INJECTION INTRAVENOUS; SUBCUTANEOUS at 05:05

## 2020-01-07 RX ADMIN — CHLORHEXIDINE GLUCONATE 1 APPLICATION(S): 213 SOLUTION TOPICAL at 05:06

## 2020-01-07 RX ADMIN — Medication 3 MILLILITER(S): at 04:06

## 2020-01-07 RX ADMIN — PIPERACILLIN AND TAZOBACTAM 25 GRAM(S): 4; .5 INJECTION, POWDER, LYOPHILIZED, FOR SOLUTION INTRAVENOUS at 05:05

## 2020-01-07 RX ADMIN — Medication 4: at 05:10

## 2020-01-07 RX ADMIN — Medication 3 MILLILITER(S): at 09:33

## 2020-01-07 RX ADMIN — PIPERACILLIN AND TAZOBACTAM 25 GRAM(S): 4; .5 INJECTION, POWDER, LYOPHILIZED, FOR SOLUTION INTRAVENOUS at 18:07

## 2020-01-07 RX ADMIN — ERYTHROPOIETIN 10000 UNIT(S): 10000 INJECTION, SOLUTION INTRAVENOUS; SUBCUTANEOUS at 12:46

## 2020-01-07 RX ADMIN — Medication 2: at 18:08

## 2020-01-07 RX ADMIN — PANTOPRAZOLE SODIUM 40 MILLIGRAM(S): 20 TABLET, DELAYED RELEASE ORAL at 05:05

## 2020-01-07 RX ADMIN — Medication 3 MILLILITER(S): at 15:43

## 2020-01-07 RX ADMIN — Medication 2: at 23:55

## 2020-01-07 RX ADMIN — HEPARIN SODIUM 5000 UNIT(S): 5000 INJECTION INTRAVENOUS; SUBCUTANEOUS at 15:21

## 2020-01-07 RX ADMIN — Medication 3 MILLILITER(S): at 20:33

## 2020-01-07 RX ADMIN — LACTULOSE 200 GRAM(S): 10 SOLUTION ORAL at 04:48

## 2020-01-07 RX ADMIN — PANTOPRAZOLE SODIUM 40 MILLIGRAM(S): 20 TABLET, DELAYED RELEASE ORAL at 18:08

## 2020-01-07 RX ADMIN — Medication 1 APPLICATION(S): at 05:05

## 2020-01-07 RX ADMIN — HEPARIN SODIUM 5000 UNIT(S): 5000 INJECTION INTRAVENOUS; SUBCUTANEOUS at 20:52

## 2020-01-07 RX ADMIN — MORPHINE SULFATE 1 MILLIGRAM(S): 50 CAPSULE, EXTENDED RELEASE ORAL at 08:13

## 2020-01-07 RX ADMIN — MORPHINE SULFATE 1 MILLIGRAM(S): 50 CAPSULE, EXTENDED RELEASE ORAL at 20:51

## 2020-01-07 RX ADMIN — MORPHINE SULFATE 1 MILLIGRAM(S): 50 CAPSULE, EXTENDED RELEASE ORAL at 01:42

## 2020-01-07 NOTE — PROGRESS NOTE ADULT - SUBJECTIVE AND OBJECTIVE BOX
[facility] DIVISION OF KIDNEY DISEASES AND HYPERTENSION -- HEMODIALYSIS NOTE  --------------------------------------------------------------------------------  Chief Complaint: ESRD/Ongoing hemodialysis requirement    24 hour events/subjective:  Tolerating HD      PAST HISTORY  --------------------------------------------------------------------------------  No significant changes to PMH, PSH, FHx, SHx, unless otherwise noted    ALLERGIES & MEDICATIONS  --------------------------------------------------------------------------------  Allergies  ACE inhibitors (Other (Mild))    Intolerances  oxycodone (Sedation/Somnol)    Standing Inpatient Medications  albuterol/ipratropium for Nebulization. 3 milliLiter(s) Nebulizer every 6 hours  ceFAZolin   IVPB 2000 milliGRAM(s) IV Intermittent once  chlorhexidine 2% Cloths 1 Application(s) Topical <User Schedule>  chlorhexidine 4% Liquid 1 Application(s) Topical <User Schedule>  collagenase Ointment 1 Application(s) Topical daily  dextrose 5%. 1000 milliLiter(s) IV Continuous <Continuous>  dextrose 50% Injectable 12.5 Gram(s) IV Push once  dextrose 50% Injectable 25 Gram(s) IV Push once  dextrose 50% Injectable 25 Gram(s) IV Push once  epoetin sara Injectable 49754 Unit(s) IV Push <User Schedule>  heparin  Injectable 5000 Unit(s) SubCutaneous every 8 hours  insulin lispro (HumaLOG) corrective regimen sliding scale   SubCutaneous every 6 hours  lactulose Retention Enema 200 Gram(s) Rectal two times a day  pantoprazole  Injectable 40 milliGRAM(s) IV Push two times a day  piperacillin/tazobactam IVPB.. 3.375 Gram(s) IV Intermittent every 12 hours    PRN Inpatient Medications  acetaminophen  Suppository .. 650 milliGRAM(s) Rectal every 6 hours PRN  dextrose 40% Gel 15 Gram(s) Oral once PRN  glucagon  Injectable 1 milliGRAM(s) IntraMuscular once PRN  morphine  - Injectable 1 milliGRAM(s) SubCutaneous every 4 hours PRN  sodium chloride 0.9% lock flush 10 milliLiter(s) IV Push every 1 hour PRN  traMADol 25 milliGRAM(s) Oral every 6 hours PRN      REVIEW OF SYSTEMS  --------------------------------------------------------------------------------  See other progress note from today    VITALS/PHYSICAL EXAM  --------------------------------------------------------------------------------  T(C): 36.5 (01-07-20 @ 11:00), Max: 37.1 (01-06-20 @ 15:49)  HR: 91 (01-07-20 @ 11:00) (72 - 92)  BP: 120/71 (01-07-20 @ 11:00) (108/71 - 120/71)  RR: 19 (01-07-20 @ 11:00) (18 - 19)  SpO2: 100% (01-07-20 @ 11:00) (92% - 100%)      01-06-20 @ 07:01  -  01-07-20 @ 07:00  --------------------------------------------------------  IN: 630 mL / OUT: 0 mL / NET: 630 mL      Physical Exam:  See other progress note from today  Vascular access: IRMA tunneled HD catheter+      LABS/STUDIES  --------------------------------------------------------------------------------              8.0    8.16  >-----------<  196      [01-07-20 @ 11:45]              23.4     139  |  99  |  34.0  ----------------------------<  213      [01-07-20 @ 11:45]  4.9   |  28.0  |  3.06        Ca     8.0     [01-07-20 @ 11:45]      Iron 25, TIBC 154, %sat 16      [01-02-20 @ 09:39]  Ferritin 668      [01-02-20 @ 09:39]  PTH -- (Ca 8.0)      [01-02-20 @ 17:06]   119  PTH -- (Ca 9.1)      [05-30-19 @ 18:09]   107  PTH -- (Ca 7.9)      [01-17-19 @ 16:26]   175  PTH -- (Ca 8.1)      [01-16-19 @ 16:55]   148  Vitamin D (25OH) 25.2      [01-02-20 @ 17:06]  HbA1c 5.8      [12-26-19 @ 09:04]  TSH 1.10      [05-17-19 @ 12:21]    HBsAb 18.0      [12-26-19 @ 22:01]  HBsAg Nonreact      [12-26-19 @ 22:01]  HBcAb Nonreact      [12-26-19 @ 22:01]  HCV 0.20, Nonreact      [12-26-19 @ 22:01]

## 2020-01-07 NOTE — CHART NOTE - NSCHARTNOTEFT_GEN_A_CORE
Attempted several times to place pt on nocturnal AVAPS settings in the BIPAP - Pt is adomently refusing Nocturnal BIPAP at this time - Pt was educated on risks and benefits and continues to refuse at this time - Pt is encouraged to call if she changes her mind and would like her noct bipap.  Will continue to monitor patient at this time.

## 2020-01-07 NOTE — PROGRESS NOTE ADULT - SUBJECTIVE AND OBJECTIVE BOX
[facility]  INFECTIOUS DISEASES AND INTERNAL MEDICINE at [place]  =======================================================  Matt Jackson MD  Diplomates American Board of Internal Medicine and Infectious Diseases  Telephone [phone]  Fax            [phone]  =======================================================    [id number]  KUN JOSEFINA   follow up for : right infiltrate    seen in HD unit today  swallow eval appreciated.  Failed.   pending PEG tube tomorrow      =======================================================     REVIEW OF SYSTEMS:  Limited due to medical condition    =======================================================  Allergies  ACE inhibitors (Other (Mild))  oxycodone (Sedation/Somnol)    Antibiotics:  ceFAZolin   IVPB 2000 milliGRAM(s) IV Intermittent once  piperacillin/tazobactam IVPB.. 3.375 Gram(s) IV Intermittent every 12 hours    Other medications:  albuterol/ipratropium for Nebulization. 3 milliLiter(s) Nebulizer every 6 hours  chlorhexidine 2% Cloths 1 Application(s) Topical <User Schedule>  chlorhexidine 4% Liquid 1 Application(s) Topical <User Schedule>  collagenase Ointment 1 Application(s) Topical daily  dextrose 5%. 1000 milliLiter(s) IV Continuous <Continuous>  dextrose 50% Injectable 12.5 Gram(s) IV Push once  dextrose 50% Injectable 25 Gram(s) IV Push once  dextrose 50% Injectable 25 Gram(s) IV Push once  epoetin sara Injectable 22277 Unit(s) IV Push <User Schedule>  heparin  Injectable 5000 Unit(s) SubCutaneous every 8 hours  insulin lispro (HumaLOG) corrective regimen sliding scale   SubCutaneous every 6 hours  lactulose Retention Enema 200 Gram(s) Rectal two times a day  pantoprazole  Injectable 40 milliGRAM(s) IV Push two times a day      ======================================================  Physical Exam:  ============  T(F): 98 (07 Jan 2020 14:00), Max: 98.7 (06 Jan 2020 15:49)  HR: 85 (07 Jan 2020 14:00)  BP: 120/68 (07 Jan 2020 14:00)  RR: 18 (07 Jan 2020 14:00)  SpO2: 100% (07 Jan 2020 14:00) (92% - 100%)  temp max in last 48H T(F): , Max: 98.7 (01-06-20 @ 15:49)    General:  No acute distress.  OBESE  Eye: Pupils are equal, round and reactive to light, Extraocular movements are intact, opacified right eye  HENT: Normocephalic,  Nasal cannula in place  Neck: Supple, No lymphadenopathy.  + RIGHT neck HD catheter  Respiratory: fair air entry anteriorly  Cardiovascular: Normal rate, Regular rhythm,   + ANASARCA  Gastrointestinal: Soft, Non-tender, Non-distended, Normal bowel sounds.  Genitourinary: No costovertebral angle tenderness.  Lymphatics: No lymphadenopathy neck,   Musculoskeletal: no joint abnl  Integumentary: No rash.  Neurologic: awake, tracks  Psychiatric: not evaluated.    =======================================================  Labs:                        8.0    8.16  )-----------( 196      ( 07 Jan 2020 11:45 )             23.4       WBC Count: 8.16 K/uL (01-07-20 @ 11:45)  WBC Count: 9.77 K/uL (01-03-20 @ 07:59)      01-07    139  |  99  |  34.0<H>  ----------------------------<  213<H>  4.9   |  28.0  |  3.06<H>    Ca    8.0<L>      07 Jan 2020 11:45        Culture - Urine (collected 12-26-19 @ 02:26)  Source: .Urine  Final Report (12-27-19 @ 12:00):    Culture grew 3 or more types of organisms which indicate    collection contamination; consider recollection only if clinically    indicated.    ,    TYPE: (C=Critical, N=Notification, A=Abnormal) n    TESTS:  _ urine contamination    DATE/TIME CALLED: _ 12/27/2019 11:59:55    CALLED TO: Anais meza rn    READ BACK (2 Patient Identifiers)(Y/N): _ y    READ BACK VALUES (Y/N): _ y    CALLED BY: Anais sanchez    Culture - Blood (collected 12-26-19 @ 00:45)  Source: .Blood  Final Report (12-31-19 @ 01:00):    No growth at 5 days.    Culture - Blood (collected 12-26-19 @ 00:45)  Source: .Blood  Final Report (12-31-19 @ 01:00):    No growth at 5 days.      Serum Pro-Brain Natriuretic Peptide: >41973 pg/mL (01-07-20 @ 11:44)  Serum Pro-Brain Natriuretic Peptide: 5670 pg/mL (05-30-19 @ 07:20)  Serum Pro-Brain Natriuretic Peptide: 7026 pg/mL (05-17-19 @ 11:39)  Serum Pro-Brain Natriuretic Peptide: 8357 pg/mL (03-22-19 @ 12:15)  Serum Pro-Brain Natriuretic Peptide: 6562 pg/mL (03-21-19 @ 05:53)  Serum Pro-Brain Natriuretic Peptide: 49033 pg/mL (03-13-19 @ 17:35)  Serum Pro-Brain Natriuretic Peptide: 8895 pg/mL (02-27-19 @ 13:02)  Serum Pro-Brain Natriuretic Peptide: 7475 pg/mL (02-24-19 @ 10:37)  Serum Pro-Brain Natriuretic Peptide: 5867 pg/mL (02-13-19 @ 05:32)  Serum Pro-Brain Natriuretic Peptide: 7854 pg/mL (02-10-19 @ 22:15)  Serum Pro-Brain Natriuretic Peptide: 4676 pg/mL (12-21-18 @ 12:03)  Serum Pro-Brain Natriuretic Peptide: 9880 pg/mL (12-17-18 @ 08:15)  Serum Pro-Brain Natriuretic Peptide: 5901 pg/mL (12-11-18 @ 11:32)  Serum Pro-Brain Natriuretic Peptide: 3460 pg/mL (11-25-18 @ 19:39)  Serum Pro-Brain Natriuretic Peptide: 144 pg/mL (05-22-15 @ 11:05)

## 2020-01-07 NOTE — PROGRESS NOTE ADULT - ASSESSMENT
This  65 y/o F with a h/o dCHF, moderate pHTN, CAD, CKD, HTN, DM, cirrhosis, portal gastropathy, recurrent GIB, blind, large sacral wound, presents to the ED from NH with AMS, hypoxemia, and hypotension. Patient had large melenotic BM in the ED. H&H: 5.8/17. Lactate: 2.7. KEITH on CKD. Hyperkalemic. Volume resuscitation began with 3u PRBC. Patient minimally responsive and moaning in pain. UA(+) with thick tan urine in fontenot. Developed progressive respiratory distress and hypoxemia requiring NIPPV. (26 Dec 2019 03:58)    Patient was managed by medical team for Encephalopathy, which was improving, thought to be multifactorial- Hepatic, Uremic. CT head negative, EEG without seizures   She also had  acute on chronic diastolic CHF/ Acute hypoxic respiratory failure.  For her acute blood loss Anemia , she responded to blood transfusion, no signs of active bleeding at this time.  She is on Protonix, octreotide- total 5 days through 12/30/19, abx for SBP prophylaxis.  She has known CASTAÑEDA Cirrhosis with portal gastropathy and AVMs, on Lactulose and rifaximin, followed by GI team.  For her KEITH/ATN on CKD, she has been getting intermittent dialysis in hospital.     During initial part of hospital stay, she had fever of T100 and is being treated for sepsis.  Her Ucx was negative and her Blood cx was also negative .  There was a right sided infiltrate on CXR, was on zosyn at nursing home being treated for the same.  Completed course of meropenem x 5 days, 1/1/2020.      Now called back on 1/6/2020 for SOB, possible aspiration PNA    Impression:  recent PNA at SNF, then treated  called for SOB, possible PNA    - WBC not elevated on repeat labs,    infiltrates from fluid retention given that PRO-BNP is greater than 70,000 pg/ml.  - continue dialysis  - continue with Zosyn; anticipate short course.     Regarding failed swallow evaluation  - pending PEG  - no contraindication to proceeding from ID POV  - no additional pre-procedural Abx.       - follow up all outstanding cultures  - trend temperature and WBC curve  - repeat cultures from blood and all sources if febrile.

## 2020-01-07 NOTE — PROGRESS NOTE ADULT - ASSESSMENT
63 yo female with hx of CHFpEF, CAD, CKD, HTN, DM, cirrhosis, portal gastropathy, recurrent GI bleed, blind, sacral decub ulcer, presented to the ED from nursing home with encephalopathy, hypoxic resp failure requiring bipap, in ED found to have anemia with Hb 5 that responded to blood transfusions (hx of recurrent GI bleed for which multiple EGDs/colonoscopies have been performed, no further invasive work up as per GI), sepsis secondary to PNA on abx, KEITH on CKD now requiring HD via left IJ. tunnelled cath palced for access on 1/3/20. PT failed s/s and is NPO. NGT feeding was started  and family to make decisions over the weekend. multiple family conversations ongoing    1- Unstageable Sacral Decub  s/p debridement bedside x 2   Surg plans debridement in OR    2- Right pneumonia - suspected  aspiration  MBS NPO alternative options for nutrional and fluid  intake   ID recalled to evaluate   on zosyn iv stable for possible PEG     Pt is high risk for recurrent aspiration   add neb therapy , chest vest to mobilize secretion   keep head elevated all the times 30-45 %     d/w daughter re risk of aspiration even with NGT or PEG     3- bilateral superficial vein thrombosis on US doppler of upper ext   d/w vascular  they will reevaluate with attending team   rec anticoagulation  due to GI bleed will hold off   d/w GI defer full dose anticoagulation now pending PEG   HB repeat stable       4-Oropharengeal dysphagia   Currently with NGT feeding   family decision  re PEG , daughter wants the feeding tube     5- s/p Metabolic vs Uremic vs Hepatic Encephalopathy  Likely multifactorial - Hepatic, Uremic.   Uremia has resolved. Hepatic- responds well to lactulose  CT head negative, EEG without seizures      6-Acute on chronic diastolic CHF/ Acute hypoxic respiratory failure  No longer requiring bipap and now weaned off Hi yazmin O2 and tolerating nasal cannula    7-Acute blood loss Anemia   Hx of recurrent GI bleed? prior EGDs/ colonoscopies this year which were negative, prior CTA with +transverse colon bleed  Seen by GI on this admission  Responded to blood transfusions   No signs of active bleeding at this time.    Protonix daily     8-Known CASTAÑEDA Cirrhosis with portal gastropathy and AVMs  Lactulose and rifaximin  Octreotide, PPI    9-KEITH/ATN on CKD  cont HD per nephrology     10-DM2- cont sliding scale insulin coverage   Well controlled  Cont current regimen

## 2020-01-07 NOTE — PROGRESS NOTE ADULT - ASSESSMENT
Patient was seen and evaluated on dialysis.   Patient is tolerating the procedure well.   T(C): 36.5 (01-07-20 @ 11:00), Max: 37.1 (01-06-20 @ 15:49)  HR: 91 (01-07-20 @ 11:00) (72 - 92)  BP: 120/71 (01-07-20 @ 11:00) (108/71 - 120/71)  Continue dialysis:   Dialyzer:   Revaclear 300       QB:  400 ml/min      QD: 500ml/min  Goal UF 1.5-2.0 L over 180 mins

## 2020-01-07 NOTE — PROGRESS NOTE ADULT - SUBJECTIVE AND OBJECTIVE BOX
Patient seen and examined; chart reviewed.  Events noted.  Seen earlier on this admission initially by NA on  and last by MT on .  Had presented with aspiration PNA and acute resp distress and moderate GI bleeding.  Some transfusions required, 4 units PRBC's.  Not scoped on this admission.  Prior EGD: :  PHG.  Colonoscopy in : 1 small TA but source for bleeding was not identified.  CTA had suggested right colon source, in past.  Also felt to have bleeding from SB AVM's, in past.  Currently transferred out of the ICU to floor.  with improved resp status.  Treated with IV antibiotics for PNA.  Restarted yesterday on Zosyn for respiratory compromise.  No found to have an upper extremity DVT and querried re AC.    Speech / Swallowing eval yesterday was failed.  Silent aspiration noted.  Recommended NPO with alternate means of nutrition.  Dobhoff Tube feedsrestarted and currently receiving Glucerna at 65 cc/ hr.          PAST MEDICAL & SURGICAL HISTORY:  Legally blind in right eye, as defined in [place]  CAD in native artery  Chronic CHF  DM (diabetes mellitus), type 2  GERD (gastroesophageal reflux disease)  Hyperlipidemia  HTN (hypertension)  Congestive heart failure, unspecified HF chronicity, unspecified heart failure type  Insomnia  Retinal detachment  CAD (coronary artery disease)  Glaucoma  Urinary retention  Peripheral neuropathy  GERD (gastroesophageal reflux disease)  Depression  Diabetes  Hypertension, unspecified type  Herniated disc, cervical  PAD (peripheral artery disease)  Legally blind  History of peripheral vascular disease  History of retinal detachment  History of CEA (carotid endarterectomy): Right  Hyperlipidemia  Glaucoma  Hypertension  Diabetes  H/O carotid endarterectomy  S/P CABG (coronary artery bypass graft)  No significant past surgical history  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  No dyspepsia  No abdominal pain.    No Nausea, vomiting.  No Bleeding.  No hematemesis.   No diarrhea.    No hematochezia.  No weight loss, anorexia.  No edema.      MEDICATIONS  (STANDING):  albuterol/ipratropium for Nebulization. 3 milliLiter(s) Nebulizer every 6 hours  ceFAZolin   IVPB 2000 milliGRAM(s) IV Intermittent once  chlorhexidine 2% Cloths 1 Application(s) Topical <User Schedule>  chlorhexidine 4% Liquid 1 Application(s) Topical <User Schedule>  collagenase Ointment 1 Application(s) Topical daily  dextrose 5%. 1000 milliLiter(s) (50 mL/Hr) IV Continuous <Continuous>  dextrose 50% Injectable 12.5 Gram(s) IV Push once  dextrose 50% Injectable 25 Gram(s) IV Push once  dextrose 50% Injectable 25 Gram(s) IV Push once  epoetin sara Injectable 07462 Unit(s) IV Push <User Schedule>  heparin  Injectable 5000 Unit(s) SubCutaneous every 8 hours  insulin lispro (HumaLOG) corrective regimen sliding scale   SubCutaneous every 6 hours  lactulose Retention Enema 200 Gram(s) Rectal two times a day  pantoprazole  Injectable 40 milliGRAM(s) IV Push two times a day  piperacillin/tazobactam IVPB.. 3.375 Gram(s) IV Intermittent every 12 hours    MEDICATIONS  (PRN):  acetaminophen  Suppository .. 650 milliGRAM(s) Rectal every 6 hours PRN Temp greater or equal to 38C (100.4F), Mild Pain (1 - 3)  dextrose 40% Gel 15 Gram(s) Oral once PRN Blood Glucose LESS THAN 70 milliGRAM(s)/deciLiter  glucagon  Injectable 1 milliGRAM(s) IntraMuscular once PRN Glucose <70 milliGRAM(s)/deciLiter  morphine  - Injectable 1 milliGRAM(s) SubCutaneous every 4 hours PRN Moderate Pain (4 - 6)  sodium chloride 0.9% lock flush 10 milliLiter(s) IV Push every 1 hour PRN Pre/post blood products, medications, blood draw, and to maintain line patency  traMADol 25 milliGRAM(s) Oral every 6 hours PRN Moderate Pain (4 - 6)      Allergies    ACE inhibitors (Other (Mild))    Intolerances    oxycodone (Sedation/Somnol)      Vital Signs Last 24 Hrs  T(C): 36.7 (2020 08:05), Max: 37.1 (2020 15:49)  T(F): 98 (2020 08:05), Max: 98.7 (2020 15:49)  HR: 92 (2020 08:05) (72 - 92)  BP: 108/71 (2020 08:05) (108/71 - 114/82)  BP(mean): --  RR: 19 (2020 08:05) (18 - 19)  SpO2: 92% (2020 08:05) (92% - 98%)    PHYSICAL EXAM:    GENERAL: NAD, well-groomed, well-developed  HEAD:  Atraumatic, Normocephalic  EYES: EOMI, PERRLA, conjunctiva and sclera clear  ENMT: No tonsillar erythema, exudates, or enlargement; Moist mucous membranes, Good dentition, No lesions  NECK: Supple, No JVD, Normal thyroid  CHEST/LUNG: Clear to percussion bilaterally; No rales, rhonchi, wheezing, or rubs  HEART: Regular rate and rhythm; No murmurs, rubs, or gallops  ABDOMEN: Soft, Nontender, Nondistended; Bowel sounds present  EXTREMITIES:  2+ Peripheral Pulses, No clubbing, cyanosis, or edema  LYMPH: No lymphadenopathy noted  SKIN: No rashes or lesions      LABS:                   RADIOLOGY & ADDITIONAL STUDIES:  CXR:  Right infiltrate with right> left pleural effusion.  CM.  HD subclavian  catheter on right.

## 2020-01-07 NOTE — PROGRESS NOTE ADULT - ASSESSMENT
Many medical issues.  Chart review:  EGD in 8/2019 had shown PHG but no gastric or esophageal varices.  Prior imaging with CT and CTA 8 months ago showed no cirrhosis.  Recent US suggested knobby appearance to the liver.  Hepatitis B,C profiles were negative.  Chronically on HD. and will be dialyzed later today.  Currently only on SQ Heparin for DVT prophylaxis.  Seems clinically better from prior aspiration PNA.  Hx of CABG, ESLD, Large sacral decubitus.  Recurrent GI Bleeding but no identifiable source found.    Now with recently failed S/ Swallow eval.  PEG indicated.  Remains at high risk for procedure.  May eventually need long term AC for new Upper extremity DVT.  Remains at high risk for recurrent bleeding as well.     Regarding EGD / PEG:  P/R/B/Prep reviewed with daughter Xiao who is agreeable to proceed.  Arrangements made.  Ancef ordered on call to procedure.  Will obtain consent later from daughter.  ASA #4,  [place]auti #3.

## 2020-01-07 NOTE — PROGRESS NOTE ADULT - SUBJECTIVE AND OBJECTIVE BOX
[facility] DIVISION OF KIDNEY DISEASES AND HYPERTENSION -- FOLLOW UP NOTE  --------------------------------------------------------------------------------  Chief Complaint:    24 hour events/subjective:        PAST HISTORY  --------------------------------------------------------------------------------  No significant changes to PMH, PSH, FHx, SHx, unless otherwise noted    ALLERGIES & MEDICATIONS  --------------------------------------------------------------------------------  Allergies    ACE inhibitors (Other (Mild))    Intolerances    oxycodone (Sedation/Somnol)    Standing Inpatient Medications  albuterol/ipratropium for Nebulization. 3 milliLiter(s) Nebulizer every 6 hours  ceFAZolin   IVPB 2000 milliGRAM(s) IV Intermittent once  chlorhexidine 2% Cloths 1 Application(s) Topical <User Schedule>  chlorhexidine 4% Liquid 1 Application(s) Topical <User Schedule>  collagenase Ointment 1 Application(s) Topical daily  dextrose 5%. 1000 milliLiter(s) IV Continuous <Continuous>  dextrose 50% Injectable 12.5 Gram(s) IV Push once  dextrose 50% Injectable 25 Gram(s) IV Push once  dextrose 50% Injectable 25 Gram(s) IV Push once  epoetin sara Injectable 82842 Unit(s) IV Push <User Schedule>  heparin  Injectable 5000 Unit(s) SubCutaneous every 8 hours  insulin lispro (HumaLOG) corrective regimen sliding scale   SubCutaneous every 6 hours  lactulose Retention Enema 200 Gram(s) Rectal two times a day  pantoprazole  Injectable 40 milliGRAM(s) IV Push two times a day  piperacillin/tazobactam IVPB.. 3.375 Gram(s) IV Intermittent every 12 hours    PRN Inpatient Medications  acetaminophen  Suppository .. 650 milliGRAM(s) Rectal every 6 hours PRN  dextrose 40% Gel 15 Gram(s) Oral once PRN  glucagon  Injectable 1 milliGRAM(s) IntraMuscular once PRN  morphine  - Injectable 1 milliGRAM(s) SubCutaneous every 4 hours PRN  sodium chloride 0.9% lock flush 10 milliLiter(s) IV Push every 1 hour PRN  traMADol 25 milliGRAM(s) Oral every 6 hours PRN      REVIEW OF SYSTEMS  --------------------------------------------------------------------------------  Gen: No weight changes, fatigue, fevers/chills, weakness  Skin: No rashes  Head/Eyes/Ears/Mouth: No headache; Normal hearing; Normal vision w/o blurriness  Respiratory: No dyspnea, cough, wheezing, hemoptysis  CV: No chest pain, PND, orthopnea  GI: No abdominal pain, diarrhea, constipation, nausea, vomiting, melena, hematochezia  : No increased frequency, dysuria, hematuria, nocturia  MSK: No joint pain/swelling; no back pain; no edema  Neuro: No dizziness/lightheadedness, weakness, seizures, numbness, tingling  Heme: No easy bruising or bleeding  Endo: No heat/cold intolerance  Psych: No significant nervousness, anxiety, stress, depression    All other systems were reviewed and are negative, except as noted.    VITALS/PHYSICAL EXAM  --------------------------------------------------------------------------------  T(C): 36.5 (01-07-20 @ 11:00), Max: 37.1 (01-06-20 @ 15:49)  HR: 91 (01-07-20 @ 11:00) (72 - 92)  BP: 120/71 (01-07-20 @ 11:00) (108/71 - 120/71)  RR: 19 (01-07-20 @ 11:00) (18 - 19)  SpO2: 100% (01-07-20 @ 11:00) (92% - 100%)    01-06-20 @ 07:01  -  01-07-20 @ 07:00  --------------------------------------------------------  IN: 630 mL / OUT: 0 mL / NET: 630 mL      Physical Exam:  	Gen: NAD, well-appearing  	HEENT: Rt eye blindness  	Pulm: CTA B/L  	CV: RRR, S1S2; no rub  	Abd: +BS, soft, nontender/nondistended  	: No suprapubic tenderness  	UE: Warm, no edema; no asterixis  	LE: Warm, no edema  	Neuro: No focal deficits  	Psych: Normal affect and mood  	Skin: Warm, without rashes  	Vascular access: RIJ tunneled HD catheter+      LABS/STUDIES  --------------------------------------------------------------------------------              8.0    8.16  >-----------<  196      [01-07-20 @ 11:45]              23.4     139  |  99  |  34.0  ----------------------------<  213      [01-07-20 @ 11:45]  4.9   |  28.0  |  3.06        Ca     8.0     [01-07-20 @ 11:45]      Creatinine Trend:  SCr 3.06 [01-07 @ 11:45]  SCr 2.29 [01-03 @ 07:59]  SCr 3.70 [01-02 @ 09:39]  SCr 3.31 [01-01 @ 04:51]  SCr 2.68 [12-31 @ 05:13]    Urinalysis - [12-26-19 @ 02:27]      Color Red / Appearance Cloudy / SG 1.020 / pH 6.0      Gluc Negative / Ketone Trace  / Bili Negative / Urobili Negative       Blood Large / Protein 500 / Leuk Est Moderate / Nitrite Negative      RBC >50 / WBC 6-10 / Hyaline  / Gran  / Sq Epi  / Non Sq Epi Occasional / Bacteria Few      Iron 25, TIBC 154, %sat 16      [01-02-20 @ 09:39]  Ferritin 668      [01-02-20 @ 09:39]  PTH -- (Ca 8.0)      [01-02-20 @ 17:06]   119  PTH -- (Ca 9.1)      [05-30-19 @ 18:09]   107  PTH -- (Ca 7.9)      [01-17-19 @ 16:26]   175  PTH -- (Ca 8.1)      [01-16-19 @ 16:55]   148  Vitamin D (25OH) 25.2      [01-02-20 @ 17:06]  HbA1c 5.8      [12-26-19 @ 09:04]  TSH 1.10      [05-17-19 @ 12:21]    HBsAb 18.0      [12-26-19 @ 22:01]  HBsAg Nonreact      [12-26-19 @ 22:01]  HBcAb Nonreact      [12-26-19 @ 22:01]  HCV 0.20, Nonreact      [12-26-19 @ 22:01]

## 2020-01-07 NOTE — PROGRESS NOTE ADULT - SUBJECTIVE AND OBJECTIVE BOX
follow up for  large decub, oropharyngeal dysphagia, pneumonia   seen in am , sleeping   she is with no pain denies any symptoms   less congested today , no resp distress       Vital Signs Last 24 Hrs    T(C): 36.7 (07 Jan 2020 14:00), Max: 36.9 (06 Jan 2020 22:19)  T(F): 98 (07 Jan 2020 14:00), Max: 98.4 (06 Jan 2020 22:19)  HR: 85 (07 Jan 2020 14:00) (72 - 92)  BP: 120/68 (07 Jan 2020 14:00) (108/71 - 120/71)  BP(mean): --  RR: 18 (07 Jan 2020 14:00) (18 - 19)  SpO2: 100% (07 Jan 2020 14:00) (92% - 100%)    PHYSICAL EXAM-  GENERAL: awake and alert , weak   CHEST/LUNG: rare bilateral rhonchi , fair air entry   HEART: Regular rate and rhythm; S1/s2 No murmurs, rubs, or gallops  ABDOMEN: Soft, Nontender, Nondistended; Bowel sounds present  EXTREMITIES:  No clubbing, bilateral  heel protector in place                           8.0    8.16  )-----------( 196      ( 07 Jan 2020 11:45 )             23.4   01-07    139  |  99  |  34.0<H>  ----------------------------<  213<H>  4.9   |  28.0  |  3.06<H>    Ca    8.0<L>      07 Jan 2020 11:45        IMPRESSION:  1.  Nonocclusive deep vein thrombosis in one of the duplicated left brachial veins surrounding a catheter.  2.  No evidence of deep vein thrombosis in the right upper extremity.  3.  Superficial venous thrombosis involving the right basilic vein in the upper arm and the left cephalic vein in the upper arm.  4.  Cephalic and basilic vein measurements as described.       < end of copied text >

## 2020-01-08 DIAGNOSIS — R13.13 DYSPHAGIA, PHARYNGEAL PHASE: ICD-10-CM

## 2020-01-08 DIAGNOSIS — K29.71 GASTRITIS, UNSPECIFIED, WITH BLEEDING: ICD-10-CM

## 2020-01-08 LAB
GLUCOSE BLDC GLUCOMTR-MCNC: 114 MG/DL — HIGH (ref 70–99)
GLUCOSE BLDC GLUCOMTR-MCNC: 193 MG/DL — HIGH (ref 70–99)
GLUCOSE BLDC GLUCOMTR-MCNC: 211 MG/DL — HIGH (ref 70–99)

## 2020-01-08 PROCEDURE — 90937 HEMODIALYSIS REPEATED EVAL: CPT

## 2020-01-08 PROCEDURE — 99233 SBSQ HOSP IP/OBS HIGH 50: CPT

## 2020-01-08 PROCEDURE — 99232 SBSQ HOSP IP/OBS MODERATE 35: CPT

## 2020-01-08 RX ORDER — TUBERCULIN PURIFIED PROTEIN DERIVATIVE 5 [IU]/.1ML
5 INJECTION, SOLUTION INTRADERMAL ONCE
Refills: 0 | Status: COMPLETED | OUTPATIENT
Start: 2020-01-08 | End: 2020-01-08

## 2020-01-08 RX ORDER — HEPARIN SODIUM 5000 [USP'U]/ML
5000 INJECTION INTRAVENOUS; SUBCUTANEOUS ONCE
Refills: 0 | Status: DISCONTINUED | OUTPATIENT
Start: 2020-01-08 | End: 2020-01-08

## 2020-01-08 RX ADMIN — PANTOPRAZOLE SODIUM 40 MILLIGRAM(S): 20 TABLET, DELAYED RELEASE ORAL at 05:25

## 2020-01-08 RX ADMIN — Medication 4: at 06:32

## 2020-01-08 RX ADMIN — LACTULOSE 200 GRAM(S): 10 SOLUTION ORAL at 05:28

## 2020-01-08 RX ADMIN — Medication 2: at 12:11

## 2020-01-08 RX ADMIN — HEPARIN SODIUM 5000 UNIT(S): 5000 INJECTION INTRAVENOUS; SUBCUTANEOUS at 05:26

## 2020-01-08 RX ADMIN — Medication 3 MILLILITER(S): at 14:44

## 2020-01-08 RX ADMIN — TUBERCULIN PURIFIED PROTEIN DERIVATIVE 5 UNIT(S): 5 INJECTION, SOLUTION INTRADERMAL at 15:09

## 2020-01-08 RX ADMIN — MORPHINE SULFATE 1 MILLIGRAM(S): 50 CAPSULE, EXTENDED RELEASE ORAL at 20:40

## 2020-01-08 RX ADMIN — PIPERACILLIN AND TAZOBACTAM 25 GRAM(S): 4; .5 INJECTION, POWDER, LYOPHILIZED, FOR SOLUTION INTRAVENOUS at 17:48

## 2020-01-08 RX ADMIN — Medication 3 MILLILITER(S): at 21:10

## 2020-01-08 RX ADMIN — MORPHINE SULFATE 1 MILLIGRAM(S): 50 CAPSULE, EXTENDED RELEASE ORAL at 20:23

## 2020-01-08 RX ADMIN — Medication 3 MILLILITER(S): at 03:43

## 2020-01-08 RX ADMIN — Medication 1 APPLICATION(S): at 05:26

## 2020-01-08 RX ADMIN — PIPERACILLIN AND TAZOBACTAM 25 GRAM(S): 4; .5 INJECTION, POWDER, LYOPHILIZED, FOR SOLUTION INTRAVENOUS at 05:25

## 2020-01-08 RX ADMIN — PANTOPRAZOLE SODIUM 40 MILLIGRAM(S): 20 TABLET, DELAYED RELEASE ORAL at 17:48

## 2020-01-08 RX ADMIN — LACTULOSE 200 GRAM(S): 10 SOLUTION ORAL at 18:52

## 2020-01-08 RX ADMIN — CHLORHEXIDINE GLUCONATE 1 APPLICATION(S): 213 SOLUTION TOPICAL at 05:26

## 2020-01-08 RX ADMIN — Medication 3 MILLILITER(S): at 09:08

## 2020-01-08 NOTE — PROGRESS NOTE ADULT - ASSESSMENT
This  63 y/o F with a h/o dCHF, moderate pHTN, CAD, CKD, HTN, DM, cirrhosis, portal gastropathy, recurrent GIB, blind, large sacral wound, presents to the ED from NH with AMS, hypoxemia, and hypotension. Patient had large melenotic BM in the ED. H&H: 5.8/17. Lactate: 2.7. KEITH on CKD. Hyperkalemic. Volume resuscitation began with 3u PRBC. Patient minimally responsive and moaning in pain. UA(+) with thick tan urine in fontenot. Developed progressive respiratory distress and hypoxemia requiring NIPPV. (26 Dec 2019 03:58)    Patient was managed by medical team for Encephalopathy, which was improving, thought to be multifactorial- Hepatic, Uremic. CT head negative, EEG without seizures   She also had  acute on chronic diastolic CHF/ Acute hypoxic respiratory failure.  For her acute blood loss Anemia , she responded to blood transfusion, no signs of active bleeding at this time.  She is on Protonix, octreotide- total 5 days through 12/30/19, abx for SBP prophylaxis.  She has known CASTAÑEDA Cirrhosis with portal gastropathy and AVMs, on Lactulose and rifaximin, followed by GI team.  For her KEITH/ATN on CKD, she has been getting intermittent dialysis in hospital.     During initial part of hospital stay, she had fever of T100 and is being treated for sepsis.  Her Ucx was negative and her Blood cx was also negative .  There was a right sided infiltrate on CXR, was on zosyn at nursing home being treated for the same.  Completed course of meropenem x 5 days, 1/1/2020.      Now called back on 1/6/2020 for SOB, possible aspiration PNA    Impression:  recent PNA at SNF, then treated  SOB; likely fluid overload    - WBC not elevated on repeat labs,    infiltrates from fluid retention given that PRO-BNP is greater than 70,000 pg/ml.  - continue dialysis  - continue with Zosyn; anticipate short course to end on 01/10/2020    Regarding failed swallow evaluation  - pending PEG  - no contraindication to proceeding from ID POV  - no additional pre-procedural Abx.       - follow up all outstanding cultures  - trend temperature and WBC curve  - repeat cultures from blood and all sources if febrile.

## 2020-01-08 NOTE — PROGRESS NOTE ADULT - SUBJECTIVE AND OBJECTIVE BOX
Patient is a 64y old  Female who presents with a chief complaint of Acute hypoxemic respiratory failure, GIB (2020 15:56)      HPI:  65 y/o F with a h/o dCHF, moderate pHTN, CAD, CKD, HTN, DM, cirrhosis, portal gastropathy, recurrent GIB, blind, large sacral wound, presents to the ED from NH with AMS, hypoxemia, and hypotension. Patient had large melenotic BM in the ED. H&H: 5.8/17. Lactate: 2.7. KEITH on CKD. Hyperkalemic. Volume resuscitation began with 3u PRBC. Patient minimally responsive and moaning in pain. UA(+) with thick tan urine in fontenot. Developed progressive respiratory distress and hypoxemia requiring NIPPV. (26 Dec 2019 03:58)      REVIEW OF SYSTEMS:  Constitutional: No fever, weight loss or fatigue  ENMT:  No difficulty hearing, tinnitus, vertigo; No sinus or throat pain  Respiratory: No cough, wheezing, chills or hemoptysis  Cardiovascular: No chest pain, palpitations, dizziness or leg swelling  Gastrointestinal: No abdominal or epigastric pain. No nausea, vomiting or hematemesis; No diarrhea or constipation. No melena or hematochezia.  Skin: No itching, burning, rashes or lesions   Musculoskeletal: No joint pain or swelling; No muscle, back or extremity pain    PAST MEDICAL & SURGICAL HISTORY:  Legally blind in right eye, as defined in [place]  CAD in native artery  Chronic CHF  DM (diabetes mellitus), type 2  GERD (gastroesophageal reflux disease)  Hyperlipidemia  HTN (hypertension)  Congestive heart failure, unspecified HF chronicity, unspecified heart failure type  Insomnia  Retinal detachment  CAD (coronary artery disease)  Glaucoma  Urinary retention  Peripheral neuropathy  GERD (gastroesophageal reflux disease)  Depression  Diabetes  Hypertension, unspecified type  Herniated disc, cervical  PAD (peripheral artery disease)  Legally blind  History of peripheral vascular disease  History of retinal detachment  History of CEA (carotid endarterectomy): Right  Hyperlipidemia  Glaucoma  Hypertension  Diabetes  H/O carotid endarterectomy  S/P CABG (coronary artery bypass graft)  No significant past surgical history  S/P CABG x 1  After cataract  Uveitic glaucoma of both eyes  Detached retina  Atherosclerosis of right carotid artery   delivery delivered      FAMILY HISTORY:  No pertinent family history in first degree relatives  FH: type 2 diabetes: mom and dad      SOCIAL HISTORY:  Smoking Status: [ ] Current, [ ] Former, [ ] Never  Pack Years:  [  ] EtOH  [  ] IVDA    MEDICATIONS:  MEDICATIONS  (STANDING):  albuterol/ipratropium for Nebulization. 3 milliLiter(s) Nebulizer every 6 hours  chlorhexidine 2% Cloths 1 Application(s) Topical <User Schedule>  chlorhexidine 4% Liquid 1 Application(s) Topical <User Schedule>  collagenase Ointment 1 Application(s) Topical daily  dextrose 5%. 1000 milliLiter(s) (50 mL/Hr) IV Continuous <Continuous>  dextrose 50% Injectable 12.5 Gram(s) IV Push once  dextrose 50% Injectable 25 Gram(s) IV Push once  dextrose 50% Injectable 25 Gram(s) IV Push once  epoetin sara Injectable 39128 Unit(s) IV Push <User Schedule>  heparin  Injectable 5000 Unit(s) SubCutaneous every 8 hours  insulin lispro (HumaLOG) corrective regimen sliding scale   SubCutaneous every 6 hours  lactulose Retention Enema 200 Gram(s) Rectal two times a day  pantoprazole  Injectable 40 milliGRAM(s) IV Push two times a day  piperacillin/tazobactam IVPB.. 3.375 Gram(s) IV Intermittent every 12 hours    MEDICATIONS  (PRN):  acetaminophen  Suppository .. 650 milliGRAM(s) Rectal every 6 hours PRN Temp greater or equal to 38C (100.4F), Mild Pain (1 - 3)  dextrose 40% Gel 15 Gram(s) Oral once PRN Blood Glucose LESS THAN 70 milliGRAM(s)/deciLiter  glucagon  Injectable 1 milliGRAM(s) IntraMuscular once PRN Glucose <70 milliGRAM(s)/deciLiter  morphine  - Injectable 1 milliGRAM(s) SubCutaneous every 4 hours PRN Moderate Pain (4 - 6)  sodium chloride 0.9% lock flush 10 milliLiter(s) IV Push every 1 hour PRN Pre/post blood products, medications, blood draw, and to maintain line patency  traMADol 25 milliGRAM(s) Oral every 6 hours PRN Moderate Pain (4 - 6)      Allergies    ACE inhibitors (Other (Mild))    Intolerances    oxycodone (Sedation/Somnol)      Vital Signs Last 24 Hrs  T(C): 36.1 (2020 20:35), Max: 36.7 (2020 14:00)  T(F): 97 (2020 20:35), Max: 98 (2020 14:00)  HR: 90 (2020 03:44) (80 - 92)  BP: 122/68 (2020 20:35) (114/64 - 122/68)  BP(mean): --  RR: 18 (2020 20:35) (18 - 19)  SpO2: 97% (2020 20:35) (95% - 100%)     @ 07:01  -  08 @ 07:00  --------------------------------------------------------  IN: 770 mL / OUT: 1000 mL / NET: -230 mL          PHYSICAL EXAM:    General: Well developed; well nourished; in no acute distress  HEENT: MMM, conjunctiva and sclera clear  Gastrointestinal: Soft, non-tender non-distended; Normal bowel sounds; No rebound or guarding  Extremities: Normal range of motion, No clubbing, cyanosis or edema  Neurological: Alert and oriented x3  Skin: Warm and dry. No obvious rash      LABS:                        8.0    8.16  )-----------( 196      ( 2020 11:45 )             23.4     2020 11:45    139    |  99     |  34.0   ----------------------------<  213    4.9     |  28.0   |  3.06     Ca    8.0        2020 11:45                RADIOLOGY & ADDITIONAL STUDIES: Patient is a 64y old  Female who presents with a chief complaint of Acute hypoxemic respiratory failure, GIB (2020 15:56)      HPI:  63 y/o F with a h/o dCHF, moderate pHTN, CAD, CKD, HTN, DM, cirrhosis, portal gastropathy, recurrent GIB, blind, large sacral wound, presents to the ED from NH with AMS, hypoxemia, and hypotension.  Patient with encephalopathy. Today open eyes to verbal stimuli. Received SQ heparin around 5 am today.         REVIEW OF SYSTEMS:  unable to obtain      PAST MEDICAL & SURGICAL HISTORY:  Legally blind in right eye, as defined in [place]  CAD in native artery  Chronic CHF  DM (diabetes mellitus), type 2  GERD (gastroesophageal reflux disease)  Hyperlipidemia  HTN (hypertension)  Congestive heart failure, unspecified HF chronicity, unspecified heart failure type  Insomnia  Retinal detachment  CAD (coronary artery disease)  Glaucoma  Urinary retention  Peripheral neuropathy  GERD (gastroesophageal reflux disease)  Depression  Diabetes  Hypertension, unspecified type  Herniated disc, cervical  PAD (peripheral artery disease)  Legally blind  History of peripheral vascular disease  History of retinal detachment  History of CEA (carotid endarterectomy): Right  Hyperlipidemia  Glaucoma  Hypertension  Diabetes  H/O carotid endarterectomy  S/P CABG (coronary artery bypass graft)  No significant past surgical history  S/P CABG x 1  After cataract  Uveitic glaucoma of both eyes  Detached retina  Atherosclerosis of right carotid artery   delivery delivered      FAMILY HISTORY:  No pertinent family history in first degree relatives  FH: type 2 diabetes: mom and dad      SOCIAL HISTORY:  Smoking Status: [ ] Current, [ ] Former, [ ] Never  Pack Years:  [  ] EtOH-no  [  ] IVDA    MEDICATIONS:  MEDICATIONS  (STANDING):  albuterol/ipratropium for Nebulization. 3 milliLiter(s) Nebulizer every 6 hours  chlorhexidine 2% Cloths 1 Application(s) Topical <User Schedule>  chlorhexidine 4% Liquid 1 Application(s) Topical <User Schedule>  collagenase Ointment 1 Application(s) Topical daily  dextrose 5%. 1000 milliLiter(s) (50 mL/Hr) IV Continuous <Continuous>  dextrose 50% Injectable 12.5 Gram(s) IV Push once  dextrose 50% Injectable 25 Gram(s) IV Push once  dextrose 50% Injectable 25 Gram(s) IV Push once  epoetin sara Injectable 43968 Unit(s) IV Push <User Schedule>  heparin  Injectable 5000 Unit(s) SubCutaneous every 8 hours  insulin lispro (HumaLOG) corrective regimen sliding scale   SubCutaneous every 6 hours  lactulose Retention Enema 200 Gram(s) Rectal two times a day  pantoprazole  Injectable 40 milliGRAM(s) IV Push two times a day  piperacillin/tazobactam IVPB.. 3.375 Gram(s) IV Intermittent every 12 hours    MEDICATIONS  (PRN):  acetaminophen  Suppository .. 650 milliGRAM(s) Rectal every 6 hours PRN Temp greater or equal to 38C (100.4F), Mild Pain (1 - 3)  dextrose 40% Gel 15 Gram(s) Oral once PRN Blood Glucose LESS THAN 70 milliGRAM(s)/deciLiter  glucagon  Injectable 1 milliGRAM(s) IntraMuscular once PRN Glucose <70 milliGRAM(s)/deciLiter  morphine  - Injectable 1 milliGRAM(s) SubCutaneous every 4 hours PRN Moderate Pain (4 - 6)  sodium chloride 0.9% lock flush 10 milliLiter(s) IV Push every 1 hour PRN Pre/post blood products, medications, blood draw, and to maintain line patency  traMADol 25 milliGRAM(s) Oral every 6 hours PRN Moderate Pain (4 - 6)      Allergies    ACE inhibitors (Other (Mild))    Intolerances    oxycodone (Sedation/Somnol)      Vital Signs Last 24 Hrs  T(C): 36.1 (2020 20:35), Max: 36.7 (2020 14:00)  T(F): 97 (2020 20:35), Max: 98 (2020 14:00)  HR: 90 (2020 03:44) (80 - 92)  BP: 122/68 (2020 20:35) (114/64 - 122/68)  BP(mean): --  RR: 18 (2020 20:35) (18 - 19)  SpO2: 97% (2020 20:35) (95% - 100%)    -07 @ 07:01  -   @ 07:00  --------------------------------------------------------  IN: 770 mL / OUT: 1000 mL / NET: -230 mL          PHYSICAL EXAM:    General: frail  HEENT: MMM, conjunctiva and sclera clear  H- RRR  L- CTA  Gastrointestinal: Soft, non-tender non-distended; Normal bowel sounds; No rebound or guarding  Extremities: Normal range of motion, No clubbing, cyanosis or edema  Neurological: Alert and oriented x3  Skin: Warm and dry. No obvious rash      LABS:                        8.0    8.16  )-----------( 196      ( 2020 11:45 )             23.4     2020 11:45    139    |  99     |  34.0   ----------------------------<  213    4.9     |  28.0   |  3.06     Ca    8.0        2020 11:45                RADIOLOGY & ADDITIONAL STUDIES:     < from: CT Chest No Cont (19 @ 09:28) >  MPRESSION:     Right upper lobe consolidation with additional smaller right upper lobe opacities, likely pneumonia.    Small bilateral pleural effusions with groundglass attenuation throughout the majority of the lungs and interlobular septal thickening, likely pulmonary edema.    Right lower lobe opacity with an appearance suggestive of rounded atelectasis.    Patchy left upper lobe opacities; differentialincludes infectious/inflammatory etiologies or pulmonary edema.    New 7 mm left upper lobe pulmonary nodule.    Diffuse anasarca.    A follow-up noncontrast chest CT is recommended in 6 weeks following treatment.    < end of copied text >

## 2020-01-08 NOTE — PROGRESS NOTE ADULT - SUBJECTIVE AND OBJECTIVE BOX
This is a Palliative Care Followup  This is a 64 year old female PMHX dCHF, pHTN, CAD, CKD, HTN, cirrhosis, recurrent GIB, blindness, large sacralwound, admitted to [facility] from NH with altered mental status, hypoxemia, hypotension Patient was transfused with PRBc, in and out of consciousness, and developed progressive respiratory distress requiring NIPPV    <HPI:  63 y/o F with a h/o dCHF, moderate pHTN, CAD, CKD, HTN, DM, cirrhosis, portal gastropathy, recurrent GIB, blind, large sacral wound, presents to the ED from NH with AMS, hypoxemia, and hypotension. Patient had large melenotic BM in the ED. H&H: 5.8/17. Lactate: 2.7. KEITH on CKD. Hyperkalemic. Volume resuscitation began with 3u PRBC. Patient minimally responsive and moaning in pain. UA(+) with thick tan urine in fontenot. Developed progressive respiratory distress and hypoxemia requiring NIPPV. (26 Dec 2019 03:58)> end of copied text     Present Symptoms:   Dyspnea: 0  Nausea/Vomiting: Patient denies but difficult to obtain due to poor mentation   Anxiety:  Patient denies but difficult to obtain due to poor mentation   Depression: Patient denies but difficult to obtain due to poor mentation    Fatigue: Yes   Loss of appetite: Yes     Pain: Difficult to obtain due to poor mentation             Character-            Duration-            Effect-            Factors-            Frequency-            Location-            Severity-    Review of Systems: Reviewed  Difficult to obtain due to poor mentation   All others negative    MEDICATIONS  (STANDING):  albuterol/ipratropium for Nebulization. 3 milliLiter(s) Nebulizer every 6 hours  chlorhexidine 2% Cloths 1 Application(s) Topical <User Schedule>  chlorhexidine 4% Liquid 1 Application(s) Topical <User Schedule>  collagenase Ointment 1 Application(s) Topical daily  dextrose 5%. 1000 milliLiter(s) (50 mL/Hr) IV Continuous <Continuous>  dextrose 50% Injectable 12.5 Gram(s) IV Push once  dextrose 50% Injectable 25 Gram(s) IV Push once  dextrose 50% Injectable 25 Gram(s) IV Push once  epoetin sara Injectable 83889 Unit(s) IV Push <User Schedule>  insulin lispro (HumaLOG) corrective regimen sliding scale   SubCutaneous every 6 hours  lactulose Retention Enema 200 Gram(s) Rectal two times a day  pantoprazole  Injectable 40 milliGRAM(s) IV Push two times a day  piperacillin/tazobactam IVPB.. 3.375 Gram(s) IV Intermittent every 12 hours  PPD  5 Tuberculin Unit(s) Injectable 5 Unit(s) IntraDermal once    MEDICATIONS  (PRN):  acetaminophen  Suppository .. 650 milliGRAM(s) Rectal every 6 hours PRN Temp greater or equal to 38C (100.4F), Mild Pain (1 - 3)  dextrose 40% Gel 15 Gram(s) Oral once PRN Blood Glucose LESS THAN 70 milliGRAM(s)/deciLiter  glucagon  Injectable 1 milliGRAM(s) IntraMuscular once PRN Glucose <70 milliGRAM(s)/deciLiter  morphine  - Injectable 1 milliGRAM(s) SubCutaneous every 4 hours PRN Moderate Pain (4 - 6)  sodium chloride 0.9% lock flush 10 milliLiter(s) IV Push every 1 hour PRN Pre/post blood products, medications, blood draw, and to maintain line patency  traMADol 25 milliGRAM(s) Oral every 6 hours PRN Moderate Pain (4 - 6)      PHYSICAL EXAM:    Vital Signs Last 24 Hrs  T(C): 36.6 (08 Jan 2020 07:45), Max: 36.7 (07 Jan 2020 14:00)  T(F): 97.9 (08 Jan 2020 07:45), Max: 98 (07 Jan 2020 14:00)  HR: 80 (08 Jan 2020 09:09) (80 - 91)  BP: 134/73 (08 Jan 2020 07:45) (114/64 - 134/73)  BP(mean): --  RR: 19 (08 Jan 2020 07:45) (18 - 19)  SpO2: 99% (08 Jan 2020 09:09) (95% - 100%)    General: alert  confused    Karnofsky:  %30    HEENT: normal      Lungs: mild dyspnea    CV: normal      GI: incontinent    : fontenot    MSK: weakness , edema +2 generalized    Skin: thin frail ecchymotic skin , pressure ulcers- Stage__UNSTAGEABLE___        LABS                   8.0    8.16  )-----------( 196      ( 07 Jan 2020 11:45 )             23.4     01-07    139  |  99  |  34.0<H>  ----------------------------<  213<H>  4.9   |  28.0  |  3.06<H>    Ca    8.0<L>      07 Jan 2020 11:45      I&O's Summary    07 Jan 2020 07:01  -  08 Jan 2020 07:00  --------------------------------------------------------  IN: 770 mL / OUT: 1000 mL / NET: -230 mL        RADIOLOGY & ADDITIONAL STUDIES:  CXR 01.06.20   FINDINGS:    Interval progression of right mid and lower lung infiltrate. Visualized left lung remains clear. Magnified cardiac silhouette is stable and is probably enlarged. There is evidence of a prior sternotomy and mediastinal surgery. Coronary artery stent is identified on the left side. A monitoring device projects over the left cardiac silhouette. An NG tube extending into the stomach and a right-sided central line remain in place. Surgical clips are evident in the right lower neck.  IMPRESSION:   Interval progression of right lung infiltrate.  Additional findings as above.    ADVANCE DIRECTIVES:   Full CODE

## 2020-01-08 NOTE — PROGRESS NOTE ADULT - SUBJECTIVE AND OBJECTIVE BOX
[facility]  INFECTIOUS DISEASES AND INTERNAL MEDICINE at [place]  =======================================================  Matt Jackson MD  Diplomates American Board of Internal Medicine and Infectious Diseases  Telephone [phone]  Fax            [phone]  =======================================================    [id number]  KUN LAVINIARESURPRIS   follow up for : right infiltrate    no new events  more awake today.   mumbling   for PEG today      =======================================================     REVIEW OF SYSTEMS:  Limited due to medical condition    =======================================================  Allergies  ACE inhibitors (Other (Mild))  oxycodone (Sedation/Somnol)    Antibiotics:  piperacillin/tazobactam IVPB.. 3.375 Gram(s) IV Intermittent every 12 hours    Other medications:  albuterol/ipratropium for Nebulization. 3 milliLiter(s) Nebulizer every 6 hours  chlorhexidine 2% Cloths 1 Application(s) Topical <User Schedule>  chlorhexidine 4% Liquid 1 Application(s) Topical <User Schedule>  collagenase Ointment 1 Application(s) Topical daily  dextrose 5%. 1000 milliLiter(s) IV Continuous <Continuous>  dextrose 50% Injectable 12.5 Gram(s) IV Push once  dextrose 50% Injectable 25 Gram(s) IV Push once  dextrose 50% Injectable 25 Gram(s) IV Push once  epoetin sara Injectable 28238 Unit(s) IV Push <User Schedule>  insulin lispro (HumaLOG) corrective regimen sliding scale   SubCutaneous every 6 hours  lactulose Retention Enema 200 Gram(s) Rectal two times a day  pantoprazole  Injectable 40 milliGRAM(s) IV Push two times a day  PPD  5 Tuberculin Unit(s) Injectable 5 Unit(s) IntraDermal once      ======================================================  Physical Exam:  ============  T(F): 97.9 (08 Jan 2020 07:45), Max: 98 (07 Jan 2020 14:00)  HR: 80 (08 Jan 2020 09:09)  BP: 134/73 (08 Jan 2020 07:45)  RR: 19 (08 Jan 2020 07:45)  SpO2: 99% (08 Jan 2020 09:09) (95% - 100%)  temp max in last 48H T(F): , Max: 98.7 (01-06-20 @ 15:49)    General:  No acute distress.  OBESE  Eye: Pupils are equal, round and reactive to light, Extraocular movements are intact, opacified right eye  HENT: Normocephalic,  Nasal cannula in place  Neck: Supple, No lymphadenopathy.  + RIGHT neck HD catheter  Respiratory: fair air entry anteriorly  Cardiovascular: Normal rate, Regular rhythm,   + ANASARCA  Gastrointestinal: Soft, Non-tender, Non-distended, Normal bowel sounds.  Genitourinary: No costovertebral angle tenderness.  Lymphatics: No lymphadenopathy neck,   Musculoskeletal: no joint abnl  Integumentary: No rash.  Neurologic: awake, tracks  Psychiatric: not evaluated.    =======================================================  Labs:                        8.0    8.16  )-----------( 196      ( 07 Jan 2020 11:45 )             23.4       WBC Count: 8.16 K/uL (01-07-20 @ 11:45)      01-07    139  |  99  |  34.0<H>  ----------------------------<  213<H>  4.9   |  28.0  |  3.06<H>    Ca    8.0<L>      07 Jan 2020 11:45        Culture - Urine (collected 12-26-19 @ 02:26)  Source: .Urine  Final Report (12-27-19 @ 12:00):    Culture grew 3 or more types of organisms which indicate    collection contamination; consider recollection only if clinically    indicated.    ,    TYPE: (C=Critical, N=Notification, A=Abnormal) n    TESTS:  _ urine contamination    DATE/TIME CALLED: _ 12/27/2019 11:59:55    CALLED TO: _ darius meza rn    READ BACK (2 Patient Identifiers)(Y/N): _ y    READ BACK VALUES (Y/N): _ y    CALLED BY: Anais sanchez    Culture - Blood (collected 12-26-19 @ 00:45)  Source: .Blood  Final Report (12-31-19 @ 01:00):    No growth at 5 days.    Culture - Blood (collected 12-26-19 @ 00:45)  Source: .Blood  Final Report (12-31-19 @ 01:00):    No growth at 5 days.

## 2020-01-08 NOTE — PROGRESS NOTE ADULT - SUBJECTIVE AND OBJECTIVE BOX
[facility] DIVISION OF KIDNEY DISEASES AND HYPERTENSION -- FOLLOW UP NOTE  --------------------------------------------------------------------------------  Chief Complaint: ESRD/Ongoing hemodialysis requirement    24 hour events/subjective:  HD yesterday    PAST HISTORY  --------------------------------------------------------------------------------  No significant changes to PMH, PSH, FHx, SHx, unless otherwise noted    ALLERGIES & MEDICATIONS  --------------------------------------------------------------------------------  Allergies  ACE inhibitors (Other (Mild))    Intolerances  oxycodone (Sedation/Somnol)    Standing Inpatient Medications  albuterol/ipratropium for Nebulization. 3 milliLiter(s) Nebulizer every 6 hours  chlorhexidine 2% Cloths 1 Application(s) Topical <User Schedule>  chlorhexidine 4% Liquid 1 Application(s) Topical <User Schedule>  collagenase Ointment 1 Application(s) Topical daily  dextrose 5%. 1000 milliLiter(s) IV Continuous <Continuous>  dextrose 50% Injectable 12.5 Gram(s) IV Push once  dextrose 50% Injectable 25 Gram(s) IV Push once  dextrose 50% Injectable 25 Gram(s) IV Push once  epoetin sara Injectable 32885 Unit(s) IV Push <User Schedule>  insulin lispro (HumaLOG) corrective regimen sliding scale   SubCutaneous every 6 hours  lactulose Retention Enema 200 Gram(s) Rectal two times a day  pantoprazole  Injectable 40 milliGRAM(s) IV Push two times a day  piperacillin/tazobactam IVPB.. 3.375 Gram(s) IV Intermittent every 12 hours    PRN Inpatient Medications  acetaminophen  Suppository .. 650 milliGRAM(s) Rectal every 6 hours PRN  dextrose 40% Gel 15 Gram(s) Oral once PRN  glucagon  Injectable 1 milliGRAM(s) IntraMuscular once PRN  morphine  - Injectable 1 milliGRAM(s) SubCutaneous every 4 hours PRN  sodium chloride 0.9% lock flush 10 milliLiter(s) IV Push every 1 hour PRN  traMADol 25 milliGRAM(s) Oral every 6 hours PRN      REVIEW OF SYSTEMS  --------------------------------------------------------------------------------  Gen: No weight changes, fatigue, fevers/chills, weakness  Skin: No rashes  Head/Eyes/Ears/Mouth: No headache; Normal hearing; Right eye blindness  Respiratory: No dyspnea, cough, wheezing, hemoptysis  CV: No chest pain, PND, orthopnea  GI: No abdominal pain, diarrhea, constipation, nausea, vomiting, melena, hematochezia  : No increased frequency, dysuria, hematuria, nocturia  MSK: No joint pain/swelling; no back pain; no edema  Neuro: No dizziness/lightheadedness, weakness, seizures, numbness, tingling  Heme: No easy bruising or bleeding  Endo: No heat/cold intolerance  Psych: No significant nervousness, anxiety, stress, depression    All other systems were reviewed and are negative, except as noted.    VITALS/PHYSICAL EXAM  --------------------------------------------------------------------------------  T(C): 36.6 (01-08-20 @ 07:45), Max: 36.6 (01-07-20 @ 16:02)  HR: 80 (01-08-20 @ 09:09) (80 - 90)  BP: 134/73 (01-08-20 @ 07:45) (114/64 - 134/73)  RR: 19 (01-08-20 @ 07:45) (18 - 19)  SpO2: 99% (01-08-20 @ 14:39) (97% - 99%)      01-07-20 @ 07:01  -  01-08-20 @ 07:00  --------------------------------------------------------  IN: 770 mL / OUT: 1000 mL / NET: -230 mL      Physical Exam:  	Gen: NAD, well-appearing, sleeping  	HEENT: supple neck  	Pulm: On nasal canula. Coarse breath sounds B/L  	CV: RRR, S1S2; no rub  	Back: No spinal or CVA tenderness; no sacral edema  	Abd: +BS, soft, nontender/nondistended. NG tube right nare (feeds held)  	: No suprapubic tenderness  	UE: Warm, no edema; no asterixis  	LE: Warm,  no edema  	Neuro: No focal deficits  	Psych: Normal affect and mood  	Skin: Warm, without rashes  	Vascular access: Vascular access: RIJ tunneled HD catheter+    LABS/STUDIES  --------------------------------------------------------------------------------              8.0    8.16  >-----------<  196      [01-07-20 @ 11:45]              23.4     139  |  99  |  34.0  ----------------------------<  213      [01-07-20 @ 11:45]  4.9   |  28.0  |  3.06        Ca     8.0     [01-07-20 @ 11:45]      Creatinine Trend:  SCr 3.06 [01-07 @ 11:45]  SCr 2.29 [01-03 @ 07:59]  SCr 3.70 [01-02 @ 09:39]  SCr 3.31 [01-01 @ 04:51]  SCr 2.68 [12-31 @ 05:13]    Urinalysis - [12-26-19 @ 02:27]      Color Red / Appearance Cloudy / SG 1.020 / pH 6.0      Gluc Negative / Ketone Trace  / Bili Negative / Urobili Negative       Blood Large / Protein 500 / Leuk Est Moderate / Nitrite Negative      RBC >50 / WBC 6-10 / Hyaline  / Gran  / Sq Epi  / Non Sq Epi Occasional / Bacteria Few      Iron 25, TIBC 154, %sat 16      [01-02-20 @ 09:39]  Ferritin 668      [01-02-20 @ 09:39]  PTH -- (Ca 8.0)      [01-02-20 @ 17:06]   119  PTH -- (Ca 9.1)      [05-30-19 @ 18:09]   107  PTH -- (Ca 7.9)      [01-17-19 @ 16:26]   175  PTH -- (Ca 8.1)      [01-16-19 @ 16:55]   148  Vitamin D (25OH) 25.2      [01-02-20 @ 17:06]  HbA1c 5.8      [12-26-19 @ 09:04]  TSH 1.10      [05-17-19 @ 12:21]    HBsAb 18.0      [12-26-19 @ 22:01]  HBsAg Nonreact      [12-26-19 @ 22:01]  HBcAb Nonreact      [12-26-19 @ 22:01]  HCV 0.20, Nonreact      [12-26-19 @ 22:01]

## 2020-01-08 NOTE — PROGRESS NOTE ADULT - PROBLEM SELECTOR PLAN 4
-Spoke to daughter Cas today at length regarding plan moving forward. -Expressed my support during this hospitalization, daughter very appreciative.  -At this time Cas is deciding to move forward with PEG placement with the GI team.   -In addition Cas now expressing that she spoke with her family and they have been reconsidering a debridement of the patient's sacral wound.   - At this time will continue to support and monitor    Total time spent 20 minutes    Thank you for the opportunity to assist with the care of this patient.   [facility] [phone].

## 2020-01-08 NOTE — PROGRESS NOTE ADULT - PROBLEM SELECTOR PLAN 1
Patient had NGT with feedings for intake  Now family opting for PEG placement. GI consulted, confirmed with daughter Cas.

## 2020-01-08 NOTE — PROGRESS NOTE ADULT - SUBJECTIVE AND OBJECTIVE BOX
follow up for  large decub, oropharyngeal dysphagia, pneumonia   seen in am , sleepy answer her eyes closed   had difficulty sleeping last night per nurse due to lower back pain   GI  fallow up appreciated     Vital Signs Last 24 Hrs  T(C): 36.6 (08 Jan 2020 07:45), Max: 36.7 (07 Jan 2020 14:00)  T(F): 97.9 (08 Jan 2020 07:45), Max: 98 (07 Jan 2020 14:00)  HR: 80 (08 Jan 2020 09:09) (80 - 91)  BP: 134/73 (08 Jan 2020 07:45) (114/64 - 134/73)  BP(mean): --  RR: 19 (08 Jan 2020 07:45) (18 - 19)  SpO2: 99% (08 Jan 2020 09:09) (95% - 100%)    PHYSICAL EXAM-  GENERAL: no distress , lethargic   CHEST/LUNG: rare bilateral rhonchi , fair air entry coarse BS   HEART: Regular rate and rhythm; S1/s2 No murmurs, rubs, or gallops  ABDOMEN: Soft, Nontender, Nondistended; Bowel sounds present  EXTREMITIES:  No clubbing, bilateral  heel protector in place                                                 8.0    8.16  )-----------( 196      ( 07 Jan 2020 11:45 )             23.4   01-07    139  |  99  |  34.0<H>  ----------------------------<  213<H>  4.9   |  28.0  |  3.06<H>    Ca    8.0<L>      07 Jan 2020 11:45        IMPRESSION:  1.  Nonocclusive deep vein thrombosis in one of the duplicated left brachial veins surrounding a catheter.  2.  No evidence of deep vein thrombosis in the right upper extremity.  3.  Superficial venous thrombosis involving the right basilic vein in the upper arm and the left cephalic vein in the upper arm.  4.  Cephalic and basilic vein measurements as described.       < end of copied text >

## 2020-01-08 NOTE — PROGRESS NOTE ADULT - ASSESSMENT
63 yo female with hx of CHFpEF, CAD, CKD, HTN, DM, cirrhosis, portal gastropathy, recurrent GI bleed, blind, sacral decub ulcer, presented to the ED from nursing home with encephalopathy, hypoxic resp failure requiring bipap, in ED found to have anemia with Hb 5 that responded to blood transfusions (hx of recurrent GI bleed for which multiple EGDs/colonoscopies have been performed, no further invasive work up as per GI), sepsis secondary to PNA on abx, KEITH on CKD now requiring HD via left IJ. tunnelled cath palced for access on 1/3/20. PT failed s/s and is NPO. NGT feeding was started  and family to make decisions initialy re forther care PEG , now opting for PEG . Had upper ext mapping US showed left barchial veins DVT around the cathater and superficial right basilic and left cephalic vein thrombosis which vascular rec full naticoagulation if not contraindicated due to GI bleed on admission ;discussed with GI to hold on full dose anticoagulation for now , abx restarted for worsening pulmonary infiltrate and congestion , failed repeat swallow evaluation and MBS , PEG insertion per GI now daughter consenting     1- Unstageable Sacral Decub  s/p debridement bedside x 2 by surgery   family initial refused debr in the OR now reconsidering   d/w surgery team few days ago     2- Right pneumonia - suspected  aspiration  cont iv zosyn for worsening pulm infiltrate and congestion   NPO , risk  of aspiration with NGT and even PEG present   told the daughter     3- bilateral superficial vein thrombosis on US doppler of upper ext and DVT on left barchail vein around the cathater   vascular rec anticoagulation  due to GI bleed will hold off ;   d/w GI defer full dose anticoagulation now pending PEG   HB repeat stable       4-Oropharengeal dysphagia   Currently with NGT feeding   PEG insertion by GI today or in am   hold heparin sq     5- s/p Metabolic vs Uremic vs Hepatic Encephalopathy  Likely multifactorial - Hepatic, Uremic.   Uremia has resolved. Hepatic- responds well to lactulose  CT head negative, EEG without seizures      6-Acute on chronic diastolic CHF/ Acute hypoxic respiratory failure  No longer requiring bipap and now weaned off Hi yazmin O2 and tolerating nasal cannula  chest vest to mobilize secretion   use lasix as needed   HD for fluid removal     7-Acute blood loss Anemia   Hx of recurrent GI bleed? prior EGDs/ colonoscopies this year which were negative, prior CTA with +transverse colon bleed  Seen by GI on this admission no further work up   Responded to blood transfusions   No signs of active bleeding at this time.    Protonix daily     8-Known CASTAÑEDA Cirrhosis with portal gastropathy and AVMs  Lactulose and rifaximin  Octreotide, PPI    9-KEITH/ATN on CKD  cont HD per nephrology     10-DM2- cont sliding scale insulin coverage   Well controlled  Cont current regimen

## 2020-01-08 NOTE — PROGRESS NOTE ADULT - PROBLEM SELECTOR PLAN 1
- pt failed swallow study. Unfortunately pt received SQ heparin today. This will depy case until after 1. However, may not be able to do the case today as second GI may not be available at that time ( takes 2 MD to place PEG).   - IV anitbiotics on call to EGD/PEG  - I asked nurse to call endo around noon today to check  to see if PEG can be done today

## 2020-01-08 NOTE — PROGRESS NOTE ADULT - ASSESSMENT
This is a 64 year old female PMHX dCHF, pHTN, CAD, CKD, HTN, cirrhosis, recurrent GIB, blindness, large sacralwound, admitted to [facility] from NH with altered mental status, hypoxemia, hypotension Patient was transfused with PRBc, in and out of consciousness, and developed progressive respiratory distress requiring NIPPV

## 2020-01-09 DIAGNOSIS — R13.12 DYSPHAGIA, OROPHARYNGEAL PHASE: ICD-10-CM

## 2020-01-09 LAB
ALBUMIN SERPL ELPH-MCNC: 2.2 G/DL — LOW (ref 3.3–5.2)
ANION GAP SERPL CALC-SCNC: 11 MMOL/L — SIGNIFICANT CHANGE UP (ref 5–17)
BLD GP AB SCN SERPL QL: SIGNIFICANT CHANGE UP
BUN SERPL-MCNC: 34 MG/DL — HIGH (ref 8–20)
CALCIUM SERPL-MCNC: 7.8 MG/DL — LOW (ref 8.6–10.2)
CHLORIDE SERPL-SCNC: 98 MMOL/L — SIGNIFICANT CHANGE UP (ref 98–107)
CO2 SERPL-SCNC: 28 MMOL/L — SIGNIFICANT CHANGE UP (ref 22–29)
CREAT SERPL-MCNC: 2.76 MG/DL — HIGH (ref 0.5–1.3)
GLUCOSE BLDC GLUCOMTR-MCNC: 128 MG/DL — HIGH (ref 70–99)
GLUCOSE BLDC GLUCOMTR-MCNC: 177 MG/DL — HIGH (ref 70–99)
GLUCOSE BLDC GLUCOMTR-MCNC: 204 MG/DL — HIGH (ref 70–99)
GLUCOSE BLDC GLUCOMTR-MCNC: 209 MG/DL — HIGH (ref 70–99)
GLUCOSE BLDC GLUCOMTR-MCNC: 225 MG/DL — HIGH (ref 70–99)
GLUCOSE SERPL-MCNC: 212 MG/DL — HIGH (ref 70–115)
HCT VFR BLD CALC: 22.5 % — LOW (ref 34.5–45)
HGB BLD-MCNC: 7.8 G/DL — LOW (ref 11.5–15.5)
MCHC RBC-ENTMCNC: 32.2 PG — SIGNIFICANT CHANGE UP (ref 27–34)
MCHC RBC-ENTMCNC: 34.7 GM/DL — SIGNIFICANT CHANGE UP (ref 32–36)
MCV RBC AUTO: 93 FL — SIGNIFICANT CHANGE UP (ref 80–100)
PHOSPHATE SERPL-MCNC: 3.2 MG/DL — SIGNIFICANT CHANGE UP (ref 2.4–4.7)
PLATELET # BLD AUTO: 227 K/UL — SIGNIFICANT CHANGE UP (ref 150–400)
POTASSIUM SERPL-MCNC: 4.9 MMOL/L — SIGNIFICANT CHANGE UP (ref 3.5–5.3)
POTASSIUM SERPL-SCNC: 4.9 MMOL/L — SIGNIFICANT CHANGE UP (ref 3.5–5.3)
RBC # BLD: 2.42 M/UL — LOW (ref 3.8–5.2)
RBC # FLD: 21.2 % — HIGH (ref 10.3–14.5)
SODIUM SERPL-SCNC: 137 MMOL/L — SIGNIFICANT CHANGE UP (ref 135–145)
WBC # BLD: 6.4 K/UL — SIGNIFICANT CHANGE UP (ref 3.8–10.5)
WBC # FLD AUTO: 6.4 K/UL — SIGNIFICANT CHANGE UP (ref 3.8–10.5)

## 2020-01-09 PROCEDURE — 90937 HEMODIALYSIS REPEATED EVAL: CPT

## 2020-01-09 PROCEDURE — 99232 SBSQ HOSP IP/OBS MODERATE 35: CPT

## 2020-01-09 RX ORDER — ERYTHROPOIETIN 10000 [IU]/ML
10000 INJECTION, SOLUTION INTRAVENOUS; SUBCUTANEOUS
Refills: 0 | Status: DISCONTINUED | OUTPATIENT
Start: 2020-01-09 | End: 2020-01-23

## 2020-01-09 RX ORDER — MORPHINE SULFATE 50 MG/1
1 CAPSULE, EXTENDED RELEASE ORAL EVERY 4 HOURS
Refills: 0 | Status: DISCONTINUED | OUTPATIENT
Start: 2020-01-09 | End: 2020-01-14

## 2020-01-09 RX ADMIN — CHLORHEXIDINE GLUCONATE 1 APPLICATION(S): 213 SOLUTION TOPICAL at 05:07

## 2020-01-09 RX ADMIN — Medication 3 MILLILITER(S): at 03:49

## 2020-01-09 RX ADMIN — Medication 4: at 06:08

## 2020-01-09 RX ADMIN — PIPERACILLIN AND TAZOBACTAM 25 GRAM(S): 4; .5 INJECTION, POWDER, LYOPHILIZED, FOR SOLUTION INTRAVENOUS at 05:09

## 2020-01-09 RX ADMIN — PIPERACILLIN AND TAZOBACTAM 25 GRAM(S): 4; .5 INJECTION, POWDER, LYOPHILIZED, FOR SOLUTION INTRAVENOUS at 17:29

## 2020-01-09 RX ADMIN — PANTOPRAZOLE SODIUM 40 MILLIGRAM(S): 20 TABLET, DELAYED RELEASE ORAL at 05:12

## 2020-01-09 RX ADMIN — TRAMADOL HYDROCHLORIDE 25 MILLIGRAM(S): 50 TABLET ORAL at 12:24

## 2020-01-09 RX ADMIN — MORPHINE SULFATE 1 MILLIGRAM(S): 50 CAPSULE, EXTENDED RELEASE ORAL at 06:15

## 2020-01-09 RX ADMIN — PANTOPRAZOLE SODIUM 40 MILLIGRAM(S): 20 TABLET, DELAYED RELEASE ORAL at 17:29

## 2020-01-09 RX ADMIN — CHLORHEXIDINE GLUCONATE 1 APPLICATION(S): 213 SOLUTION TOPICAL at 05:11

## 2020-01-09 RX ADMIN — ERYTHROPOIETIN 10000 UNIT(S): 10000 INJECTION, SOLUTION INTRAVENOUS; SUBCUTANEOUS at 09:15

## 2020-01-09 RX ADMIN — Medication 4: at 00:23

## 2020-01-09 RX ADMIN — Medication 3 MILLILITER(S): at 15:20

## 2020-01-09 RX ADMIN — Medication 2: at 17:30

## 2020-01-09 RX ADMIN — Medication 3 MILLILITER(S): at 22:27

## 2020-01-09 RX ADMIN — Medication 1 APPLICATION(S): at 05:09

## 2020-01-09 RX ADMIN — TRAMADOL HYDROCHLORIDE 25 MILLIGRAM(S): 50 TABLET ORAL at 13:24

## 2020-01-09 RX ADMIN — MORPHINE SULFATE 1 MILLIGRAM(S): 50 CAPSULE, EXTENDED RELEASE ORAL at 06:05

## 2020-01-09 NOTE — PROGRESS NOTE ADULT - SUBJECTIVE AND OBJECTIVE BOX
follow up for  large decub, oropharyngeal dysphagia, pneumonia   pt is seen in am during HD   she was asking to eat , speech therapy recalled to evaluate , still not able to swallow not safe to give any oral medication     Pt admits having lower back pain severe on off , improves with pain meds       Vital Signs Last 24 Hrs  T(C): 36.6 (09 Jan 2020 10:35), Max: 37.1 (08 Jan 2020 20:00)  T(F): 97.8 (09 Jan 2020 10:35), Max: 98.7 (08 Jan 2020 20:00)  HR: 77 (09 Jan 2020 10:35) (77 - 88)  BP: 144/78 (09 Jan 2020 10:35) (107/67 - 144/78)  BP(mean): --  RR: 18 (09 Jan 2020 10:35) (17 - 18)  SpO2: 100% (09 Jan 2020 10:35) (92% - 100%)    PHYSICAL EXAM-  GENERAL: no distress , lying  in the bed   CHEST/LUNG: bilateral rhonchi , fair air entry coarse BS   HEART: Regular rate and rhythm; S1/s2 No murmurs, rubs, or gallops  ABDOMEN: Soft, Nontender, Nondistended; Bowel sounds present  EXTREMITIES:  No clubbing, bilateral  heel protector in place   Skin : pale color              IMPRESSION:  1.  Nonocclusive deep vein thrombosis in one of the duplicated left brachial veins surrounding a catheter.  2.  No evidence of deep vein thrombosis in the right upper extremity.  3.  Superficial venous thrombosis involving the right basilic vein in the upper arm and the left cephalic vein in the upper arm.  4.  Cephalic and basilic vein measurements as described.       < end of copied text >                        7.8    6.40  )-----------( 227      ( 09 Jan 2020 08:02 )             22.5   01-09    137  |  98  |  34.0<H>  ----------------------------<  212<H>  4.9   |  28.0  |  2.76<H>    Ca    7.8<L>      09 Jan 2020 08:02  Phos  3.2     01-09    TPro  x   /  Alb  2.2<L>  /  TBili  x   /  DBili  x   /  AST  x   /  ALT  x   /  AlkPhos  x   01-09    CAPILLARY BLOOD GLUCOSE      POCT Blood Glucose.: 128 mg/dL (09 Jan 2020 11:34)  POCT Blood Glucose.: 204 mg/dL (09 Jan 2020 06:04)  POCT Blood Glucose.: 209 mg/dL (09 Jan 2020 00:20)  POCT Blood Glucose.: 114 mg/dL (08 Jan 2020 18:00)

## 2020-01-09 NOTE — PROGRESS NOTE ADULT - ASSESSMENT
This  65 y/o F with a h/o dCHF, moderate pHTN, CAD, CKD, HTN, DM, cirrhosis, portal gastropathy, recurrent GIB, blind, large sacral wound, presents to the ED from NH with AMS, hypoxemia, and hypotension. Patient had large melenotic BM in the ED. H&H: 5.8/17. Lactate: 2.7. KEITH on CKD. Hyperkalemic. Volume resuscitation began with 3u PRBC. Patient minimally responsive and moaning in pain. UA(+) with thick tan urine in fontenot. Developed progressive respiratory distress and hypoxemia requiring NIPPV. (26 Dec 2019 03:58)    Patient was managed by medical team for Encephalopathy, which was improving, thought to be multifactorial- Hepatic, Uremic. CT head negative, EEG without seizures   She also had  acute on chronic diastolic CHF/ Acute hypoxic respiratory failure.  For her acute blood loss Anemia , she responded to blood transfusion, no signs of active bleeding at this time.  She is on Protonix, octreotide- total 5 days through 12/30/19, abx for SBP prophylaxis.  She has known CASTAÑEDA Cirrhosis with portal gastropathy and AVMs, on Lactulose and rifaximin, followed by GI team.  For her KEITH/ATN on CKD, she has been getting intermittent dialysis in hospital.     During initial part of hospital stay, she had fever of T100 and is being treated for sepsis.  Her Ucx was negative and her Blood cx was also negative .  There was a right sided infiltrate on CXR, was on zosyn at nursing home being treated for the same.  Completed course of meropenem x 5 days, 1/1/2020.      Now called back on 1/6/2020 for SOB, possible aspiration PNA    Impression:  recent PNA at SNF, then treated  SOB; likely fluid overload    - WBC not elevated on repeat labs,    infiltrates from fluid retention given that PRO-BNP is greater than 70,000 pg/ml.  - continue dialysis  - continue with Zosyn; anticipate short course to end on 01/10/2020 at 23:59    Regarding failed swallow evaluation  - pending PEG  - no contraindication to proceeding from ID POV  - no additional pre-procedural Abx.       - follow up all outstanding cultures  - trend temperature and WBC curve  - repeat cultures from blood and all sources if febrile.

## 2020-01-09 NOTE — PROGRESS NOTE ADULT - PROBLEM SELECTOR PLAN 1
Discharge Instructions  Kidney Stones    Kidney stones are a common problem that can cause a lot of pain but fortunately are usually not dangerous and can be generally treated with medicine at home.  However, sometimes your condition may be worse than it seemed at first, or may get worse with time.     You need to follow-up with your regular doctor within 3 days.    Most kidney stones will pass on their own, but occasionally stones may need to be removed by an urologist. We will send you home with a urine strainer. Be sure to urinate into this, or urinate into a container and pour the urine through the fine filter to catch the kidney stone as it comes out. The stone will seem like a pebble or grain of sand. Be sure to save this in a Ziploc  bag and take it to the doctor s office with you.       Return to the Emergency Department if:    Your pain is not controlled.    You are vomiting and can t keep fluids or medications down.    You develop fever (>101).    You feel much more ill or develop new symptoms.  What can I do to help myself?    Be sure to drink plenty of fluids.    Staying active is good, and may help the stone to pass. You may do whatever you feel up to doing without restrictions.   Treatment:    Non-steroidal anti-inflammatory drugs (NSAIDs). This includes prescription medicines like Toradol  (ketorolac) and non-prescription medicines like Advil  (ibuprofen) and Nuprin  (ibuprofen). These pain relievers are very effective for kidney stones.    Narcotic pain pills. If you have been given a narcotic such as Vicodin  (hydrocodone with acetaminophen), Percocet  (oxycodone with acetaminophen), or codeine, do not drive for four hours after you have taken it. If the narcotic contains Tylenol  (acetaminophen), do not take Tylenol  with it. All narcotics will cause constipation, so eat a high fiber diet.      Nausea medication.  Nausea and vomiting are common with kidney stones, so your physician may send you  home with medicine for this.     Flomax  (tamsulosin). This medicine is sometimes used for men with prostate problems, but also can help kidney stones to pass. This medicine can lower blood pressure, and you may feel faint, especially when you first stand up. Be sure to get up gradually, sit down if you feel faint, and avoid activity where feeling faint would be dangerous, such as climbing ladders.   If you were given a prescription for medicine here today, be sure to read all of the information (including the package insert) that comes with your prescription.  This will include important information about the medicine, its side effects, and any warnings that you need to know about.  The pharmacist who fills the prescription can provide more information and answer questions you may have about the medicine.  If you have questions or concerns that the pharmacist cannot address, please call or return to the Emergency Department.   Opioid Medication Information    Pain medications are among the most commonly prescribed medicines, so we are including this information for all our patients. If you did not receive pain medication or get a prescription for pain medicine, you can ignore it.     You may have been given a prescription for an opioid (narcotic) pain medicine and/or have received a pain medicine while here in the Emergency Department. These medicines can make you drowsy or impaired. You must not drive, operate dangerous equipment, or engage in any other dangerous activities while taking these medications. If you drive while taking these medications, you could be arrested for DUI, or driving under the influence. Do not drink any alcohol while you are taking these medications.     Opioid pain medications can cause addiction. If you have a history of chemical dependency of any type, you are at a higher risk of becoming addicted to pain medications.  Only take these prescribed medications to treat your pain when all  other options have been tried. Take it for as short a time and as few doses as possible. Store your pain pills in a secure place, as they are frequently stolen and provide a dangerous opportunity for children or visitors in your house to start abusing these powerful medications. We will not replace any lost or stolen medicine.  As soon as your pain is better, you should flush all your remaining medication.     Many prescription pain medications contain Tylenol  (acetaminophen), including Vicodin , Tylenol #3 , Norco , Lortab , and Percocet .  You should not take any extra pills of Tylenol  if you are using these prescription medications or you can get very sick.  Do not ever take more than 3000 mg of acetaminophen in any 24 hour period.    All opioids tend to cause constipation. Drink plenty of water and eat foods that have a lot of fiber, such as fruits, vegetables, prune juice, apple juice and high fiber cereal.  Take a laxative if you don t move your bowels at least every other day. Miralax , Milk of Magnesia, Colace , or Senna  can be used to keep you regular.      Remember that you can always come back to the Emergency Department if you are not able to see your regular doctor in the amount of time listed above, if you get any new symptoms, or if there is anything that worries you.       will plan for PEG tomorrow morning.

## 2020-01-09 NOTE — PROGRESS NOTE ADULT - SUBJECTIVE AND OBJECTIVE BOX
Pt seen and examined f/u for prior GI bleeding and now oropharyneal dysphagia    Patient now in HD so PEG rescheduled for tomorrow. She denies any abdominal pain, nausea or vomiting.    REVIEW OF SYSTEMS:    CONSTITUTIONAL: No fever, weight loss, or fatigue  EYES: No eye pain, visual disturbances, or discharge  ENMT:  No difficulty hearing, tinnitus, vertigo; No sinus or throat pain  RESPIRATORY: No cough, wheezing, chills or hemoptysis; No shortness of breath  CARDIOVASCULAR: No chest pain, palpitations, dizziness, or leg swelling  GASTROINTESTINAL: No abdominal or epigastric pain. No nausea, vomiting, or hematemesis; No diarrhea or constipation. No melena or hematochezia.    MEDICATIONS:  MEDICATIONS  (STANDING):  albuterol/ipratropium for Nebulization. 3 milliLiter(s) Nebulizer every 6 hours  chlorhexidine 2% Cloths 1 Application(s) Topical <User Schedule>  chlorhexidine 4% Liquid 1 Application(s) Topical <User Schedule>  collagenase Ointment 1 Application(s) Topical daily  dextrose 5%. 1000 milliLiter(s) (50 mL/Hr) IV Continuous <Continuous>  dextrose 50% Injectable 12.5 Gram(s) IV Push once  dextrose 50% Injectable 25 Gram(s) IV Push once  dextrose 50% Injectable 25 Gram(s) IV Push once  epoetin sara Injectable 68371 Unit(s) IV Push <User Schedule>  insulin lispro (HumaLOG) corrective regimen sliding scale   SubCutaneous every 6 hours  lactulose Retention Enema 200 Gram(s) Rectal two times a day  pantoprazole  Injectable 40 milliGRAM(s) IV Push two times a day  piperacillin/tazobactam IVPB.. 3.375 Gram(s) IV Intermittent every 12 hours    MEDICATIONS  (PRN):  acetaminophen  Suppository .. 650 milliGRAM(s) Rectal every 6 hours PRN Temp greater or equal to 38C (100.4F), Mild Pain (1 - 3)  dextrose 40% Gel 15 Gram(s) Oral once PRN Blood Glucose LESS THAN 70 milliGRAM(s)/deciLiter  glucagon  Injectable 1 milliGRAM(s) IntraMuscular once PRN Glucose <70 milliGRAM(s)/deciLiter  morphine  - Injectable 1 milliGRAM(s) SubCutaneous every 4 hours PRN Moderate Pain (4 - 6)  sodium chloride 0.9% lock flush 10 milliLiter(s) IV Push every 1 hour PRN Pre/post blood products, medications, blood draw, and to maintain line patency  traMADol 25 milliGRAM(s) Oral every 6 hours PRN Moderate Pain (4 - 6)      Allergies    ACE inhibitors (Other (Mild))    Intolerances    oxycodone (Sedation/Somnol)      Vital Signs Last 24 Hrs  T(C): 36.8 (09 Jan 2020 07:30), Max: 37.1 (08 Jan 2020 20:00)  T(F): 98.2 (09 Jan 2020 07:30), Max: 98.7 (08 Jan 2020 20:00)  HR: 88 (09 Jan 2020 07:30) (80 - 88)  BP: 135/71 (09 Jan 2020 07:30) (107/67 - 135/71)  BP(mean): --  RR: 18 (09 Jan 2020 07:30) (17 - 18)  SpO2: 100% (09 Jan 2020 07:30) (92% - 100%)    01-08 @ 07:01  -  01-09 @ 07:00  --------------------------------------------------------  IN: 280 mL / OUT: 50 mL / NET: 230 mL        PHYSICAL EXAM:    General: overweight well nourished; in no acute distress  HEENT: MMM, conjunctiva and sclera clear  Gastrointestinal:Abdomen: Soft non-tender non-distended; Normal bowel sounds; No hepatosplenomegaly  Extremities: no cyanosis, clubbing or edema.  Skin: Warm and dry. No obvious rash    LABS:      CBC Full  -  ( 09 Jan 2020 08:02 )  WBC Count : 6.40 K/uL  RBC Count : 2.42 M/uL  Hemoglobin : 7.8 g/dL  Hematocrit : 22.5 %  Platelet Count - Automated : 227 K/uL  Mean Cell Volume : 93.0 fl  Mean Cell Hemoglobin : 32.2 pg  Mean Cell Hemoglobin Concentration : 34.7 gm/dL  Auto Neutrophil # : x  Auto Lymphocyte # : x  Auto Monocyte # : x  Auto Eosinophil # : x  Auto Basophil # : x  Auto Neutrophil % : x  Auto Lymphocyte % : x  Auto Monocyte % : x  Auto Eosinophil % : x  Auto Basophil % : x    01-09    137  |  98  |  34.0<H>  ----------------------------<  212<H>  4.9   |  28.0  |  2.76<H>    Ca    7.8<L>      09 Jan 2020 08:02  Phos  3.2     01-09    TPro  x   /  Alb  2.2<L>  /  TBili  x   /  DBili  x   /  AST  x   /  ALT  x   /  AlkPhos  x   01-09 Pt seen and examined f/u for prior GI bleeding and now oropharyneal dysphagia    Patient now in HD so PEG rescheduled for tomorrow. She denies any abdominal pain, nausea or vomiting. Tolerating tube feeds.    REVIEW OF SYSTEMS:    CONSTITUTIONAL: No fever, weight loss, or fatigue  EYES: No eye pain, visual disturbances, or discharge  ENMT:  No difficulty hearing, tinnitus, vertigo; No sinus or throat pain  RESPIRATORY: No cough, wheezing, chills or hemoptysis; No shortness of breath  CARDIOVASCULAR: No chest pain, palpitations, dizziness, or leg swelling  GASTROINTESTINAL: No abdominal or epigastric pain. No nausea, vomiting, or hematemesis; No diarrhea or constipation. No melena or hematochezia.    MEDICATIONS:  MEDICATIONS  (STANDING):  albuterol/ipratropium for Nebulization. 3 milliLiter(s) Nebulizer every 6 hours  chlorhexidine 2% Cloths 1 Application(s) Topical <User Schedule>  chlorhexidine 4% Liquid 1 Application(s) Topical <User Schedule>  collagenase Ointment 1 Application(s) Topical daily  dextrose 5%. 1000 milliLiter(s) (50 mL/Hr) IV Continuous <Continuous>  dextrose 50% Injectable 12.5 Gram(s) IV Push once  dextrose 50% Injectable 25 Gram(s) IV Push once  dextrose 50% Injectable 25 Gram(s) IV Push once  epoetin sara Injectable 52927 Unit(s) IV Push <User Schedule>  insulin lispro (HumaLOG) corrective regimen sliding scale   SubCutaneous every 6 hours  lactulose Retention Enema 200 Gram(s) Rectal two times a day  pantoprazole  Injectable 40 milliGRAM(s) IV Push two times a day  piperacillin/tazobactam IVPB.. 3.375 Gram(s) IV Intermittent every 12 hours    MEDICATIONS  (PRN):  acetaminophen  Suppository .. 650 milliGRAM(s) Rectal every 6 hours PRN Temp greater or equal to 38C (100.4F), Mild Pain (1 - 3)  dextrose 40% Gel 15 Gram(s) Oral once PRN Blood Glucose LESS THAN 70 milliGRAM(s)/deciLiter  glucagon  Injectable 1 milliGRAM(s) IntraMuscular once PRN Glucose <70 milliGRAM(s)/deciLiter  morphine  - Injectable 1 milliGRAM(s) SubCutaneous every 4 hours PRN Moderate Pain (4 - 6)  sodium chloride 0.9% lock flush 10 milliLiter(s) IV Push every 1 hour PRN Pre/post blood products, medications, blood draw, and to maintain line patency  traMADol 25 milliGRAM(s) Oral every 6 hours PRN Moderate Pain (4 - 6)      Allergies    ACE inhibitors (Other (Mild))    Intolerances    oxycodone (Sedation/Somnol)      Vital Signs Last 24 Hrs  T(C): 36.8 (09 Jan 2020 07:30), Max: 37.1 (08 Jan 2020 20:00)  T(F): 98.2 (09 Jan 2020 07:30), Max: 98.7 (08 Jan 2020 20:00)  HR: 88 (09 Jan 2020 07:30) (80 - 88)  BP: 135/71 (09 Jan 2020 07:30) (107/67 - 135/71)  BP(mean): --  RR: 18 (09 Jan 2020 07:30) (17 - 18)  SpO2: 100% (09 Jan 2020 07:30) (92% - 100%)    01-08 @ 07:01  -  01-09 @ 07:00  --------------------------------------------------------  IN: 280 mL / OUT: 50 mL / NET: 230 mL        PHYSICAL EXAM:    General: overweight well nourished; in no acute distress  HEENT: MMM, conjunctiva and sclera clear, dubhoff tube present  Gastrointestinal:Abdomen: Soft non-tender non-distended; Normal bowel sounds; No hepatosplenomegaly  Extremities: no cyanosis, clubbing or edema.  Skin: Warm and dry. No obvious rash    LABS:      CBC Full  -  ( 09 Jan 2020 08:02 )  WBC Count : 6.40 K/uL  RBC Count : 2.42 M/uL  Hemoglobin : 7.8 g/dL  Hematocrit : 22.5 %  Platelet Count - Automated : 227 K/uL  Mean Cell Volume : 93.0 fl  Mean Cell Hemoglobin : 32.2 pg  Mean Cell Hemoglobin Concentration : 34.7 gm/dL  Auto Neutrophil # : x  Auto Lymphocyte # : x  Auto Monocyte # : x  Auto Eosinophil # : x  Auto Basophil # : x  Auto Neutrophil % : x  Auto Lymphocyte % : x  Auto Monocyte % : x  Auto Eosinophil % : x  Auto Basophil % : x    01-09    137  |  98  |  34.0<H>  ----------------------------<  212<H>  4.9   |  28.0  |  2.76<H>    Ca    7.8<L>      09 Jan 2020 08:02  Phos  3.2     01-09    TPro  x   /  Alb  2.2<L>  /  TBili  x   /  DBili  x   /  AST  x   /  ALT  x   /  AlkPhos  x   01-09

## 2020-01-09 NOTE — PROGRESS NOTE ADULT - SUBJECTIVE AND OBJECTIVE BOX
[facility]  INFECTIOUS DISEASES AND INTERNAL MEDICINE at [place]  =======================================================  Matt Jackson MD  Diplomates American Board of Internal Medicine and Infectious Diseases  Telephone [phone]  Fax            [phone]  =======================================================    [id number]  KUN LAVINIARESURPRIS   follow up for : right infiltrate    no new events    GI note reviewed. PEG for 1/10/2020 per note      =======================================================     REVIEW OF SYSTEMS:  Limited due to medical condition    =======================================================  Allergies  ACE inhibitors (Other (Mild))  oxycodone (Sedation/Somnol)    Antibiotics:  piperacillin/tazobactam IVPB.. 3.375 Gram(s) IV Intermittent every 12 hours    Other medications:  albuterol/ipratropium for Nebulization. 3 milliLiter(s) Nebulizer every 6 hours  chlorhexidine 2% Cloths 1 Application(s) Topical <User Schedule>  chlorhexidine 4% Liquid 1 Application(s) Topical <User Schedule>  collagenase Ointment 1 Application(s) Topical daily  dextrose 5%. 1000 milliLiter(s) IV Continuous <Continuous>  dextrose 50% Injectable 12.5 Gram(s) IV Push once  dextrose 50% Injectable 25 Gram(s) IV Push once  dextrose 50% Injectable 25 Gram(s) IV Push once  epoetin sara Injectable 02279 Unit(s) IV Push <User Schedule>  insulin lispro (HumaLOG) corrective regimen sliding scale   SubCutaneous every 6 hours  lactulose Retention Enema 200 Gram(s) Rectal two times a day  pantoprazole  Injectable 40 milliGRAM(s) IV Push two times a day        ======================================================  Physical Exam:  ============  T(F): 98.2 (09 Jan 2020 07:30), Max: 98.7 (08 Jan 2020 20:00)  HR: 88 (09 Jan 2020 07:30)  BP: 135/71 (09 Jan 2020 07:30)  RR: 18 (09 Jan 2020 07:30)  SpO2: 100% (09 Jan 2020 07:30) (92% - 100%)  temp max in last 48H T(F): , Max: 98.7 (01-08-20 @ 20:00)    General:  No acute distress.  OBESE  Eye: Pupils are equal, round and reactive to light, Extraocular movements are intact, opacified right eye  HENT: Normocephalic,  Nasal cannula in place  Neck: Supple, No lymphadenopathy.  + RIGHT neck HD catheter  Respiratory: fair air entry anteriorly  Cardiovascular: Normal rate, Regular rhythm,   + ANASARCA  Gastrointestinal: Soft, Non-tender, Non-distended, Normal bowel sounds.  Genitourinary: No costovertebral angle tenderness.  Lymphatics: No lymphadenopathy neck,   Musculoskeletal: no joint abnl  Integumentary: No rash.  Neurologic: awake, tracks  Psychiatric: not evaluated.    =======================================================  Labs:                        7.8    6.40  )-----------( 227      ( 09 Jan 2020 08:02 )             22.5       WBC Count: 6.40 K/uL (01-09-20 @ 08:02)  WBC Count: 8.16 K/uL (01-07-20 @ 11:45)      01-09    137  |  98  |  34.0<H>  ----------------------------<  212<H>  4.9   |  28.0  |  2.76<H>    Ca    7.8<L>      09 Jan 2020 08:02  Phos  3.2     01-09    TPro  x   /  Alb  2.2<L>  /  TBili  x   /  DBili  x   /  AST  x   /  ALT  x   /  AlkPhos  x   01-09

## 2020-01-09 NOTE — PROGRESS NOTE ADULT - SUBJECTIVE AND OBJECTIVE BOX
[facility] DIVISION OF KIDNEY DISEASES AND HYPERTENSION -- HEMODIALYSIS NOTE  --------------------------------------------------------------------------------  Chief Complaint: ESRD/Ongoing hemodialysis requirement    24 hour events/subjective:    PAST HISTORY  --------------------------------------------------------------------------------  No significant changes to PMH, PSH, FHx, SHx, unless otherwise noted    ALLERGIES & MEDICATIONS  --------------------------------------------------------------------------------  Allergies    ACE inhibitors (Other (Mild))    Intolerances    oxycodone (Sedation/Somnol)    Standing Inpatient Medications  albuterol/ipratropium for Nebulization. 3 milliLiter(s) Nebulizer every 6 hours  chlorhexidine 2% Cloths 1 Application(s) Topical <User Schedule>  chlorhexidine 4% Liquid 1 Application(s) Topical <User Schedule>  collagenase Ointment 1 Application(s) Topical daily  dextrose 5%. 1000 milliLiter(s) IV Continuous <Continuous>  dextrose 50% Injectable 12.5 Gram(s) IV Push once  dextrose 50% Injectable 25 Gram(s) IV Push once  dextrose 50% Injectable 25 Gram(s) IV Push once  epoetin sara Injectable 42979 Unit(s) IV Push <User Schedule>  insulin lispro (HumaLOG) corrective regimen sliding scale   SubCutaneous every 6 hours  lactulose Retention Enema 200 Gram(s) Rectal two times a day  pantoprazole  Injectable 40 milliGRAM(s) IV Push two times a day  piperacillin/tazobactam IVPB.. 3.375 Gram(s) IV Intermittent every 12 hours    PRN Inpatient Medications  acetaminophen  Suppository .. 650 milliGRAM(s) Rectal every 6 hours PRN  dextrose 40% Gel 15 Gram(s) Oral once PRN  glucagon  Injectable 1 milliGRAM(s) IntraMuscular once PRN  morphine  - Injectable 1 milliGRAM(s) SubCutaneous every 4 hours PRN  sodium chloride 0.9% lock flush 10 milliLiter(s) IV Push every 1 hour PRN  traMADol 25 milliGRAM(s) Oral every 6 hours PRN    REVIEW OF SYSTEMS  --------------------------------------------------------------------------------  Gen: + weight changes, fatigue,  No fevers/chills, weakness  Skin: No rashes  Head/Eyes/Ears/Mouth: No headache; Normal hearing;   Respiratory: No dyspnea, cough, wheezing, hemoptysis  CV: No chest pain, PND, orthopnea  GI: No abdominal pain, diarrhea, constipation, nausea, vomiting, melena, hematochezia  : No increased frequency, dysuria, hematuria, nocturia  MSK: No joint pain/swelling; no back pain; no edema  Neuro: No dizziness/lightheadedness, weakness, seizures, numbness, tingling  Heme: No easy bruising or bleeding  Endo: No heat/cold intolerance  Psych: No significant nervousness, anxiety, stress, depression    All other systems were reviewed and are negative, except as noted.    VITALS/PHYSICAL EXAM  --------------------------------------------------------------------------------  T(C): 36.6 (01-09-20 @ 10:35), Max: 37.1 (01-08-20 @ 20:00)  HR: 80 (01-09-20 @ 15:40) (77 - 88)  BP: 144/78 (01-09-20 @ 10:35) (107/67 - 144/78)  RR: 18 (01-09-20 @ 10:35) (18 - 18)  SpO2: 100% (01-09-20 @ 10:35) (92% - 100%)    01-08-20 @ 07:01  -  01-09-20 @ 07:00  --------------------------------------------------------  IN: 280 mL / OUT: 50 mL / NET: 230 mL    01-09-20 @ 07:01  -  01-09-20 @ 16:04  --------------------------------------------------------  IN: 0 mL / OUT: 1800 mL / NET: -1800 mL    Physical Exam:  	Gen: NAD, Pale, ill-appearing  	HEENT: Supple neck,   	Pulm: CTA B/L  	CV: S1S2; no rub  	Back: No spinal or CVA tenderness; Sacral Ulcer,   	Abd: +BS, soft, nontender/nondistended  	: No suprapubic tenderness  	UE: Warm, FROM, no clubbing, intact strength; no edema; no asterixis  	LE: Warm, FROM, no clubbing, intact strength; ++ edema  	Neuro: No focal deficits,  	Psych: Normal affect and mood  	Skin: Warm, without rashes  	Vascular access: TDC,     LABS/STUDIES  --------------------------------------------------------------------------------              7.8    6.40  >-----------<  227      [01-09-20 @ 08:02]              22.5     137  |  98  |  34.0  ----------------------------<  212      [01-09-20 @ 08:02]  4.9   |  28.0  |  2.76        Ca     7.8     [01-09-20 @ 08:02]      Phos  3.2     [01-09-20 @ 08:02]    TPro  x   /  Alb  2.2  /  TBili  x   /  DBili  x   /  AST  x   /  ALT  x   /  AlkPhos  x   [01-09-20 @ 08:02]    Iron 25, TIBC 154, %sat 16      [01-02-20 @ 09:39]  Ferritin 668      [01-02-20 @ 09:39]  PTH -- (Ca 8.0)      [01-02-20 @ 17:06]   119  PTH -- (Ca 9.1)      [05-30-19 @ 18:09]   107  PTH -- (Ca 7.9)      [01-17-19 @ 16:26]   175  PTH -- (Ca 8.1)      [01-16-19 @ 16:55]   148  Vitamin D (25OH) 25.2      [01-02-20 @ 17:06]  HbA1c 5.8      [12-26-19 @ 09:04]  TSH 1.10      [05-17-19 @ 12:21]    HBsAb 18.0      [12-26-19 @ 22:01]  HBsAg Nonreact      [12-26-19 @ 22:01]  HBcAb Nonreact      [12-26-19 @ 22:01]  HCV 0.20, Nonreact      [12-26-19 @ 22:01]

## 2020-01-09 NOTE — PROGRESS NOTE ADULT - ASSESSMENT
65 yo female with hx of CHFpEF, CAD, CKD, HTN, DM, cirrhosis, portal gastropathy, recurrent GI bleed, blind, sacral decub ulcer, presented to the ED from nursing home with encephalopathy, hypoxic resp failure requiring bipap, in ED found to have anemia with Hb 5 that responded to blood transfusions (hx of recurrent GI bleed for which multiple EGDs/colonoscopies have been performed, no further invasive work up as per GI), sepsis secondary to PNA on abx, KEITH on CKD now requiring HD via left IJ. tunnelled cath palced for access on 1/3/20. PT failed s/s and is NPO. NGT feeding was started  and family to make decisions initialy re forther care PEG , now opting for PEG . Had upper ext mapping US showed left barchial veins DVT around the cathater and superficial right basilic and left cephalic vein thrombosis which vascular rec full naticoagulation if not contraindicated due to GI bleed on admission ;discussed with GI to hold on full dose anticoagulation for now , abx restarted for worsening pulmonary infiltrate and congestion , failed repeat swallow evaluation and MBS , PEG insertion per GI now daughter consenting , anemic blood tx ordered     1- Oropharyngeal dysphagia   failed MBS abd swallow evaluation   for PEG by GI     2- Unstageable Sacral Decub  s/p debridement bedside x 2 by surgery   family initial refused debr in the OR now reconsidering   d/w surgery team few days ago     3- Right pneumonia - suspected  aspiration  cont iv zosyn for worsening pulm infiltrate and congestion   NPO with tube feeding   risk  of aspiration with NGT and even PEG present , family is aware     4- bilateral superficial vein thrombosis on US doppler of upper ext and DVT on left brachial  vein around the cathater   vascular rec anticoagulation  due to GI bleed will hold off ;   d/w GI defer full dose anticoagulation now pending PEG   HB dropped likely combination of blood loss and cronic anemia   poor candidate for full anticoagulation due to GI bleed and drop in hb     5- s/p Metabolic vs Uremic vs Hepatic Encephalopathy  Likely multifactorial - Hepatic, Uremic.   Uremia has resolved. Hepatic- responds well to lactulose  CT head negative, EEG without seizures      6-Acute on chronic diastolic CHF/ Acute hypoxic respiratory failure- improving   cont HD for fluid removal   iv lasix as needed   No longer requiring bipap and now weaned off Hi yazmin O2 and tolerating nasal cannula      7-Acute blood loss Anemia   Hx of recurrent GI bleed? prior EGDs/ colonoscopies this year which were negative, prior CTA with +transverse colon bleed  Seen by GI on this admission no further work up   Responded to blood transfusions   repeat hb low today no overt bleed , blood tx to keep hb over 8    Protonix daily     8-Known CASTAÑEDA Cirrhosis with portal gastropathy and AVMs  Lactulose and rifaximin  cont PPI     9-KEITH/ATN on CKD  cont HD per nephrology     10-DM2- cont sliding scale insulin coverage   Well controlled  Cont current regimen

## 2020-01-09 NOTE — PROGRESS NOTE ADULT - ASSESSMENT
Patient was seen and evaluated on dialysis.   Patient is tolerating the procedure well.   T(C): 36.6 (01-09-20 @ 10:35), Max: 37.1 (01-08-20 @ 20:00)  HR: 80 (01-09-20 @ 15:40) (77 - 88)  BP: 144/78 (01-09-20 @ 10:35) (107/67 - 144/78)  Continue dialysis:  TIW,   Dialyzer:   Revaclear 300        QB:  400 ml.,       QD: 500ml.,  Goal UF  1 - 1.5 L  over 3 .5  Hours

## 2020-01-09 NOTE — CHART NOTE - NSCHARTNOTEFT_GEN_A_CORE
Medicine PA-   Cd. @ 1945 to draw type + screen for pt. pending transfusion. Labs drawn and specimen sent; signed, timed and dated.

## 2020-01-10 LAB
ANION GAP SERPL CALC-SCNC: 10 MMOL/L — SIGNIFICANT CHANGE UP (ref 5–17)
BUN SERPL-MCNC: 23 MG/DL — HIGH (ref 8–20)
CALCIUM SERPL-MCNC: 8.3 MG/DL — LOW (ref 8.6–10.2)
CHLORIDE SERPL-SCNC: 101 MMOL/L — SIGNIFICANT CHANGE UP (ref 98–107)
CO2 SERPL-SCNC: 27 MMOL/L — SIGNIFICANT CHANGE UP (ref 22–29)
CREAT SERPL-MCNC: 2.24 MG/DL — HIGH (ref 0.5–1.3)
GLUCOSE BLDC GLUCOMTR-MCNC: 104 MG/DL — HIGH (ref 70–99)
GLUCOSE BLDC GLUCOMTR-MCNC: 110 MG/DL — HIGH (ref 70–99)
GLUCOSE BLDC GLUCOMTR-MCNC: 258 MG/DL — HIGH (ref 70–99)
GLUCOSE BLDC GLUCOMTR-MCNC: 96 MG/DL — SIGNIFICANT CHANGE UP (ref 70–99)
GLUCOSE BLDC GLUCOMTR-MCNC: 99 MG/DL — SIGNIFICANT CHANGE UP (ref 70–99)
GLUCOSE SERPL-MCNC: 95 MG/DL — SIGNIFICANT CHANGE UP (ref 70–115)
HCT VFR BLD CALC: 30.4 % — LOW (ref 34.5–45)
HGB BLD-MCNC: 10.1 G/DL — LOW (ref 11.5–15.5)
INR BLD: 1.28 RATIO — HIGH (ref 0.88–1.16)
MCHC RBC-ENTMCNC: 30.4 PG — SIGNIFICANT CHANGE UP (ref 27–34)
MCHC RBC-ENTMCNC: 33.2 GM/DL — SIGNIFICANT CHANGE UP (ref 32–36)
MCV RBC AUTO: 91.6 FL — SIGNIFICANT CHANGE UP (ref 80–100)
MRSA PCR RESULT.: SIGNIFICANT CHANGE UP
PLATELET # BLD AUTO: 259 K/UL — SIGNIFICANT CHANGE UP (ref 150–400)
POTASSIUM SERPL-MCNC: 4 MMOL/L — SIGNIFICANT CHANGE UP (ref 3.5–5.3)
POTASSIUM SERPL-SCNC: 4 MMOL/L — SIGNIFICANT CHANGE UP (ref 3.5–5.3)
PROTHROM AB SERPL-ACNC: 14.8 SEC — HIGH (ref 10–12.9)
RBC # BLD: 3.32 M/UL — LOW (ref 3.8–5.2)
RBC # FLD: 20.6 % — HIGH (ref 10.3–14.5)
S AUREUS DNA NOSE QL NAA+PROBE: SIGNIFICANT CHANGE UP
SODIUM SERPL-SCNC: 138 MMOL/L — SIGNIFICANT CHANGE UP (ref 135–145)
WBC # BLD: 6.31 K/UL — SIGNIFICANT CHANGE UP (ref 3.8–10.5)
WBC # FLD AUTO: 6.31 K/UL — SIGNIFICANT CHANGE UP (ref 3.8–10.5)

## 2020-01-10 PROCEDURE — 43246 EGD PLACE GASTROSTOMY TUBE: CPT

## 2020-01-10 PROCEDURE — 99232 SBSQ HOSP IP/OBS MODERATE 35: CPT

## 2020-01-10 RX ORDER — SODIUM CHLORIDE 9 MG/ML
1000 INJECTION INTRAMUSCULAR; INTRAVENOUS; SUBCUTANEOUS
Refills: 0 | Status: DISCONTINUED | OUTPATIENT
Start: 2020-01-10 | End: 2020-01-13

## 2020-01-10 RX ADMIN — MORPHINE SULFATE 1 MILLIGRAM(S): 50 CAPSULE, EXTENDED RELEASE ORAL at 15:23

## 2020-01-10 RX ADMIN — Medication 3 MILLILITER(S): at 20:28

## 2020-01-10 RX ADMIN — CHLORHEXIDINE GLUCONATE 1 APPLICATION(S): 213 SOLUTION TOPICAL at 06:10

## 2020-01-10 RX ADMIN — PIPERACILLIN AND TAZOBACTAM 25 GRAM(S): 4; .5 INJECTION, POWDER, LYOPHILIZED, FOR SOLUTION INTRAVENOUS at 17:07

## 2020-01-10 RX ADMIN — TUBERCULIN PURIFIED PROTEIN DERIVATIVE 5 UNIT(S): 5 INJECTION, SOLUTION INTRADERMAL at 15:31

## 2020-01-10 RX ADMIN — Medication 3 MILLILITER(S): at 04:29

## 2020-01-10 RX ADMIN — MORPHINE SULFATE 1 MILLIGRAM(S): 50 CAPSULE, EXTENDED RELEASE ORAL at 00:55

## 2020-01-10 RX ADMIN — Medication 3 MILLILITER(S): at 10:16

## 2020-01-10 RX ADMIN — Medication 1 APPLICATION(S): at 06:11

## 2020-01-10 RX ADMIN — MORPHINE SULFATE 1 MILLIGRAM(S): 50 CAPSULE, EXTENDED RELEASE ORAL at 15:53

## 2020-01-10 RX ADMIN — Medication 6: at 00:35

## 2020-01-10 RX ADMIN — MORPHINE SULFATE 1 MILLIGRAM(S): 50 CAPSULE, EXTENDED RELEASE ORAL at 09:58

## 2020-01-10 RX ADMIN — CHLORHEXIDINE GLUCONATE 1 APPLICATION(S): 213 SOLUTION TOPICAL at 06:12

## 2020-01-10 RX ADMIN — PANTOPRAZOLE SODIUM 40 MILLIGRAM(S): 20 TABLET, DELAYED RELEASE ORAL at 17:08

## 2020-01-10 RX ADMIN — MORPHINE SULFATE 1 MILLIGRAM(S): 50 CAPSULE, EXTENDED RELEASE ORAL at 09:43

## 2020-01-10 RX ADMIN — PANTOPRAZOLE SODIUM 40 MILLIGRAM(S): 20 TABLET, DELAYED RELEASE ORAL at 06:11

## 2020-01-10 RX ADMIN — MORPHINE SULFATE 1 MILLIGRAM(S): 50 CAPSULE, EXTENDED RELEASE ORAL at 00:10

## 2020-01-10 RX ADMIN — PIPERACILLIN AND TAZOBACTAM 25 GRAM(S): 4; .5 INJECTION, POWDER, LYOPHILIZED, FOR SOLUTION INTRAVENOUS at 06:11

## 2020-01-10 NOTE — PROGRESS NOTE ADULT - ASSESSMENT
This  65 y/o F with a h/o dCHF, moderate pHTN, CAD, CKD, HTN, DM, cirrhosis, portal gastropathy, recurrent GIB, blind, large sacral wound, presents to the ED from NH with AMS, hypoxemia, and hypotension. Patient had large melenotic BM in the ED. H&H: 5.8/17. Lactate: 2.7. KEITH on CKD. Hyperkalemic. Volume resuscitation began with 3u PRBC. Patient minimally responsive and moaning in pain. UA(+) with thick tan urine in fontenot. Developed progressive respiratory distress and hypoxemia requiring NIPPV. (26 Dec 2019 03:58)    Patient was managed by medical team for Encephalopathy, which was improving, thought to be multifactorial- Hepatic, Uremic. CT head negative, EEG without seizures   She also had  acute on chronic diastolic CHF/ Acute hypoxic respiratory failure.  For her acute blood loss Anemia , she responded to blood transfusion, no signs of active bleeding at this time.  She is on Protonix, octreotide- total 5 days through 12/30/19, abx for SBP prophylaxis.  She has known CASTAÑEDA Cirrhosis with portal gastropathy and AVMs, on Lactulose and rifaximin, followed by GI team.  For her KEITH/ATN on CKD, she has been getting intermittent dialysis in hospital.     During initial part of hospital stay, she had fever of T100 and is being treated for sepsis.  Her Ucx was negative and her Blood cx was also negative .  There was a right sided infiltrate on CXR, was on zosyn at nursing home being treated for the same.  Completed course of meropenem x 5 days, 1/1/2020.      Now called back on 1/6/2020 for SOB, possible aspiration PNA    Impression:  recent PNA at SNF, then treated  SOB; likely fluid overload    - WBC not elevated on repeat labs,    infiltrates from fluid retention given that PRO-BNP is greater than 70,000 pg/ml.  - continue dialysis  - continue Zosyn; anticipate short course to end on 01/10/2020 at 23:59  - will watch thereafter    Regarding failed swallow evaluation  - s/p PEG 1/10/2020      Sacral decub  - wound management per medical team    - will monitor over weekend off antibiotics        please call us P.R.N over the weekend should any new developments occur.

## 2020-01-10 NOTE — PROGRESS NOTE ADULT - SUBJECTIVE AND OBJECTIVE BOX
CC : cough , lower back pain     pt is seen earlier today around 1o am , she is resting in the bed   no pain at present controlled   no new events or complaints       Vital Signs Last 24 Hrs  T(C): 36.8 (10 Noah 2020 02:05), Max: 36.8 (10 Noah 2020 02:05)  T(F): 98.2 (10 Noah 2020 02:05), Max: 98.2 (10 Noah 2020 02:05)  HR: 86 (10 Noah 2020 04:30) (80 - 88)  BP: 144/83 (10 Noah 2020 02:05) (112/66 - 144/83)  BP(mean): --  RR: 18 (10 Noah 2020 02:05) (16 - 18)  SpO2: 96% (10 Noah 2020 04:30) (96% - 96%)    PHYSICAL EXAM-  GENERAL: no distress , lying  in the bed   CHEST/LUNG: bilateral rhonchi , fair air entry   HEART: Regular rate and rhythm; S1/s2 No murmurs  ABDOMEN: Soft, Nontender, Nondistended; Bowel sounds present  EXTREMITIES:  No clubbing, bilateral  heel protector in place                           10.1   6.31  )-----------( 259      ( 10 Onah 2020 07:39 )             30.4   01-10    138  |  101  |  23.0<H>  ----------------------------<  95  4.0   |  27.0  |  2.24<H>    Ca    8.3<L>      10 Noah 2020 07:39  Phos  3.2     01-09    TPro  x   /  Alb  2.2<L>  /  TBili  x   /  DBili  x   /  AST  x   /  ALT  x   /  AlkPhos  x   01-09                IMPRESSION:  1.  Nonocclusive deep vein thrombosis in one of the duplicated left brachial veins surrounding a catheter.  2.  No evidence of deep vein thrombosis in the right upper extremity.  3.  Superficial venous thrombosis involving the right basilic vein in the upper arm and the left cephalic vein in the upper arm.  4.  Cephalic and basilic vein measurements as described.       < end of copied text >                        CAPILLARY BLOOD GLUCOSE      POCT Blood Glucose.: 96 mg/dL (10 Noah 2020 11:03)  POCT Blood Glucose.: 104 mg/dL (10 Noah 2020 06:08)  POCT Blood Glucose.: 258 mg/dL (10 Noah 2020 00:34)  POCT Blood Glucose.: 225 mg/dL (09 Jan 2020 21:38)  POCT Blood Glucose.: 177 mg/dL (09 Jan 2020 16:34)

## 2020-01-10 NOTE — PROGRESS NOTE ADULT - SUBJECTIVE AND OBJECTIVE BOX
[facility]  INFECTIOUS DISEASES AND INTERNAL MEDICINE at [place]  =======================================================  Matt Jackson MD  Diplomates American Board of Internal Medicine and Infectious Diseases  Telephone [phone]  Fax            [phone]  =======================================================    [id number]  KUN LARISAPRCLARA   follow up for : right infiltrate    s/p placement of PEG tube.,   already has right neck HD catheter     =======================================================     REVIEW OF SYSTEMS:  Limited due to medical condition    =======================================================  Allergies  ACE inhibitors (Other (Mild))  oxycodone (Sedation/Somnol)    Antibiotics:  piperacillin/tazobactam IVPB.. 3.375 Gram(s) IV Intermittent every 12 hours    Other medications:  albuterol/ipratropium for Nebulization. 3 milliLiter(s) Nebulizer every 6 hours  chlorhexidine 2% Cloths 1 Application(s) Topical <User Schedule>  chlorhexidine 4% Liquid 1 Application(s) Topical <User Schedule>  collagenase Ointment 1 Application(s) Topical daily  dextrose 5%. 1000 milliLiter(s) IV Continuous <Continuous>  dextrose 50% Injectable 12.5 Gram(s) IV Push once  dextrose 50% Injectable 25 Gram(s) IV Push once  dextrose 50% Injectable 25 Gram(s) IV Push once  epoetin sara Injectable 01875 Unit(s) IV Push <User Schedule>  insulin lispro (HumaLOG) corrective regimen sliding scale   SubCutaneous every 6 hours  lactulose Retention Enema 200 Gram(s) Rectal two times a day  pantoprazole  Injectable 40 milliGRAM(s) IV Push two times a day  sodium chloride 0.9%. 1000 milliLiter(s) IV Continuous <Continuous>    ======================================================  Physical Exam:  ============  T(F): 98.2 (10 Noah 2020 02:05), Max: 98.2 (10 Noah 2020 02:05)  HR: 86 (10 Noah 2020 04:30)  BP: 144/83 (10 Noah 2020 02:05)  RR: 18 (10 Noah 2020 02:05)  SpO2: 96% (10 Noah 2020 04:30) (96% - 96%)  temp max in last 48H T(F): , Max: 98.7 (01-08-20 @ 20:00)    General:  No acute distress.  OBESE  Eye: Pupils are equal, round and reactive to light, Extraocular movements are intact, opacified right eye  HENT: Normocephalic,  Nasal cannula in place  Neck: Supple, No lymphadenopathy.  + RIGHT neck HD catheter  Respiratory: fair air entry anteriorly  Cardiovascular: Normal rate, Regular rhythm,   + ANASARCA  Gastrointestinal: Soft, Non-tender, Non-distended, Normal bowel sounds.  + PEG tube in LEFT lower abd, dressing inplace  Genitourinary: No costovertebral angle tenderness.  Lymphatics: No lymphadenopathy neck,   Musculoskeletal: no joint abnl  Integumentary: No rash.  Neurologic: awake, tracks  Psychiatric: not evaluated.    =======================================================  Labs:                        10.1   6.31  )-----------( 259      ( 10 Noah 2020 07:39 )             30.4       WBC Count: 6.31 K/uL (01-10-20 @ 07:39)  WBC Count: 6.40 K/uL (01-09-20 @ 08:02)  WBC Count: 8.16 K/uL (01-07-20 @ 11:45)      01-10    138  |  101  |  23.0<H>  ----------------------------<  95  4.0   |  27.0  |  2.24<H>    Ca    8.3<L>      10 Noah 2020 07:39  Phos  3.2     01-09    TPro  x   /  Alb  2.2<L>  /  TBili  x   /  DBili  x   /  AST  x   /  ALT  x   /  AlkPhos  x   01-09

## 2020-01-10 NOTE — PROGRESS NOTE ADULT - ASSESSMENT
1) KEITH on CKD now likely ESRD  2) encephalopathy- improved  4) Acute blood loss anemia superimposed on anemia of CKD  5) Hypotension- BP now improved    Next HD on Saturday  Continue QUENTIN   Monitor lytes, BP and UOP  Will follow

## 2020-01-10 NOTE — PROGRESS NOTE ADULT - SUBJECTIVE AND OBJECTIVE BOX
[facility] DIVISION OF KIDNEY DISEASES AND HYPERTENSION -- HEMODIALYSIS NOTE  --------------------------------------------------------------------------------  Chief Complaint: ESRD/Ongoing hemodialysis requirement    24 hour events/subjective:  pt seen and examined. no acute complaint offered    PAST HISTORY  --------------------------------------------------------------------------------  No significant changes to PMH, PSH, FHx, SHx, unless otherwise noted    ALLERGIES & MEDICATIONS  --------------------------------------------------------------------------------  Allergies    ACE inhibitors (Other (Mild))    Intolerances    oxycodone (Sedation/Somnol)    Standing Inpatient Medications  albuterol/ipratropium for Nebulization. 3 milliLiter(s) Nebulizer every 6 hours  chlorhexidine 2% Cloths 1 Application(s) Topical <User Schedule>  chlorhexidine 4% Liquid 1 Application(s) Topical <User Schedule>  collagenase Ointment 1 Application(s) Topical daily  dextrose 5%. 1000 milliLiter(s) IV Continuous <Continuous>  dextrose 50% Injectable 12.5 Gram(s) IV Push once  dextrose 50% Injectable 25 Gram(s) IV Push once  dextrose 50% Injectable 25 Gram(s) IV Push once  epoetin sara Injectable 34156 Unit(s) IV Push <User Schedule>  insulin lispro (HumaLOG) corrective regimen sliding scale   SubCutaneous every 6 hours  lactulose Retention Enema 200 Gram(s) Rectal two times a day  pantoprazole  Injectable 40 milliGRAM(s) IV Push two times a day  piperacillin/tazobactam IVPB.. 3.375 Gram(s) IV Intermittent every 12 hours    PRN Inpatient Medications  acetaminophen  Suppository .. 650 milliGRAM(s) Rectal every 6 hours PRN  dextrose 40% Gel 15 Gram(s) Oral once PRN  glucagon  Injectable 1 milliGRAM(s) IntraMuscular once PRN  morphine  - Injectable 1 milliGRAM(s) SubCutaneous every 4 hours PRN  sodium chloride 0.9% lock flush 10 milliLiter(s) IV Push every 1 hour PRN  traMADol 25 milliGRAM(s) Oral every 6 hours PRN      REVIEW OF SYSTEMS  --------------------------------------------------------------------------------  Gen: No weight changes, fatigue, fevers/chills, weakness  Skin: No rashes  Head/Eyes/Ears/Mouth: No headache  Respiratory: No dyspnea, cough,  CV: No chest pain, orthopnea  GI: No abdominal pain, diarrhea, constipation, nausea, vomiting,  MSK: No joint pain  Neuro: No dizziness/lightheadedness, weakness  Heme: No bleeding  Psych: No significant nervousness, anxiety, stress, depression    All other systems were reviewed and are negative, except as noted.    VITALS/PHYSICAL EXAM  --------------------------------------------------------------------------------  T(C): 36.8 (01-10-20 @ 02:05), Max: 36.8 (01-10-20 @ 02:05)  HR: 86 (01-10-20 @ 04:30) (80 - 88)  BP: 144/83 (01-10-20 @ 02:05) (112/66 - 144/83)  RR: 18 (01-10-20 @ 02:05) (16 - 18)  SpO2: 96% (01-10-20 @ 04:30) (96% - 96%)  Wt(kg): --        01-09-20 @ 07:01  -  01-10-20 @ 07:00  --------------------------------------------------------  IN: 0 mL / OUT: 1800 mL / NET: -1800 mL      Physical Exam:  	Gen: NAD  	HEENT: supple neck  	Pulm: CTA B/L  	CV: RRR, S1S2; no rub  	Abd: +BS, soft, nontender  	UE: Warm, intact strength; no asterixis  	LE: Warm, no edema  	Neuro: No focal deficits  	Psych: Normal affect and mood  	Skin: Warm, without rashes  	Vascular access: IRMA tunneled HD catheter    LABS/STUDIES  --------------------------------------------------------------------------------              10.1   6.31  >-----------<  259      [01-10-20 @ 07:39]              30.4     138  |  101  |  23.0  ----------------------------<  95      [01-10-20 @ 07:39]  4.0   |  27.0  |  2.24        Ca     8.3     [01-10-20 @ 07:39]      Phos  3.2     [01-09-20 @ 08:02]    TPro  x   /  Alb  2.2  /  TBili  x   /  DBili  x   /  AST  x   /  ALT  x   /  AlkPhos  x   [01-09-20 @ 08:02]    PT/INR: PT 14.8 , INR 1.28       [01-10-20 @ 07:39]      Iron 25, TIBC 154, %sat 16      [01-02-20 @ 09:39]  Ferritin 668      [01-02-20 @ 09:39]  PTH -- (Ca 8.0)      [01-02-20 @ 17:06]   119  PTH -- (Ca 9.1)      [05-30-19 @ 18:09]   107  PTH -- (Ca 7.9)      [01-17-19 @ 16:26]   175  PTH -- (Ca 8.1)      [01-16-19 @ 16:55]   148  Vitamin D (25OH) 25.2      [01-02-20 @ 17:06]  HbA1c 5.8      [12-26-19 @ 09:04]  TSH 1.10      [05-17-19 @ 12:21]    HBsAb 18.0      [12-26-19 @ 22:01]  HBsAg Nonreact      [12-26-19 @ 22:01]  HBcAb Nonreact      [12-26-19 @ 22:01]  HCV 0.20, Nonreact      [12-26-19 @ 22:01]

## 2020-01-10 NOTE — PROGRESS NOTE ADULT - ASSESSMENT
65 yo female with hx of CHFpEF, CAD, CKD, HTN, DM, cirrhosis, portal gastropathy, recurrent GI bleed, blind, sacral decub ulcer, presented to the ED from nursing home with encephalopathy, hypoxic resp failure requiring bipap, in ED found to have anemia with Hb 5 that responded to blood transfusions (hx of recurrent GI bleed for which multiple EGDs/colonoscopies have been performed, no further invasive work up as per GI), sepsis secondary to PNA on abx, KEITH on CKD now requiring HD via left IJ. tunnelled cath palced for access on 1/3/20. PT failed s/s and is NPO. NGT feeding was started  and family to make decisions initialy re forther care PEG , now opting for PEG . Had upper ext mapping US showed left barchial veins DVT around the cathater and superficial right basilic and left cephalic vein thrombosis which vascular rec full naticoagulation if not contraindicated due to GI bleed on admission ;discussed with GI to hold on full dose anticoagulation for now , abx restarted for worsening pulmonary infiltrate and congestion , failed repeat swallow evaluation and MBS , PEG insertion per GI now daughter consenting , anemic blood tx given ,    1- Oropharyngeal dysphagia   failed MBS abd swallow evaluation   for PEG by GI today   NPO     2- Unstageable Sacral Decub  s/p debridement bedside x 2 by surgery   family initial refused debr in the OR now reconsidering   d/w surgery team   if repeat debridment needed     3- Right pneumonia - suspected  aspiration  cont iv zosyn , ID follow up appreciated   NPO with tube feeding       4- bilateral superficial vein thrombosis on US doppler of upper ext and DVT on left brachial  vein around the cathater   vascular rec anticoagulation  due to GI bleed will hold off ;   d/w GI defer full dose anticoagulation now pending PEG and cont to monitor for drop in hb   poor candidate for full anticoagulation due to GI bleed and drop in hb   cont aspirin     5- s/p Metabolic vs Uremic vs Hepatic Encephalopathy  Likely multifactorial - Hepatic, Uremic.   Uremia has resolved. Hepatic- responds well to lactulose  CT head negative, EEG without seizures      6-Acute on chronic diastolic CHF/ Acute hypoxic respiratory failure- improving   cont HD for fluid removal   iv lasix as needed   No longer requiring bipap    7-Acute blood loss Anemia   Hx of recurrent GI bleed? prior EGDs/ colonoscopies this year which were negative, prior CTA with +transverse colon bleed  Seen by GI on this admission no further work up   Responded to blood transfusions   repeat hb low today no overt bleed , blood tx to keep hb over 8    cont Protonix daily     8-Known CASTAÑEDA Cirrhosis with portal gastropathy and AVMs  Lactulose and rifaximin  cont PPI     9-KEITH/ATN on CKD- started on new HD   cont HD per nephrology   dailysis seat in the community   l  10-DM2- cont sliding scale insulin coverage   Well controlled  Cont sliding scale insulin coverage

## 2020-01-11 DIAGNOSIS — K74.60 UNSPECIFIED CIRRHOSIS OF LIVER: ICD-10-CM

## 2020-01-11 LAB
ANION GAP SERPL CALC-SCNC: 14 MMOL/L — SIGNIFICANT CHANGE UP (ref 5–17)
BUN SERPL-MCNC: 30 MG/DL — HIGH (ref 8–20)
CALCIUM SERPL-MCNC: 7.8 MG/DL — LOW (ref 8.6–10.2)
CHLORIDE SERPL-SCNC: 100 MMOL/L — SIGNIFICANT CHANGE UP (ref 98–107)
CO2 SERPL-SCNC: 24 MMOL/L — SIGNIFICANT CHANGE UP (ref 22–29)
CREAT SERPL-MCNC: 3.01 MG/DL — HIGH (ref 0.5–1.3)
GLUCOSE BLDC GLUCOMTR-MCNC: 102 MG/DL — HIGH (ref 70–99)
GLUCOSE BLDC GLUCOMTR-MCNC: 111 MG/DL — HIGH (ref 70–99)
GLUCOSE SERPL-MCNC: 111 MG/DL — SIGNIFICANT CHANGE UP (ref 70–115)
HCT VFR BLD CALC: 27.4 % — LOW (ref 34.5–45)
HGB BLD-MCNC: 9.2 G/DL — LOW (ref 11.5–15.5)
MCHC RBC-ENTMCNC: 30.6 PG — SIGNIFICANT CHANGE UP (ref 27–34)
MCHC RBC-ENTMCNC: 33.6 GM/DL — SIGNIFICANT CHANGE UP (ref 32–36)
MCV RBC AUTO: 91 FL — SIGNIFICANT CHANGE UP (ref 80–100)
PLATELET # BLD AUTO: 271 K/UL — SIGNIFICANT CHANGE UP (ref 150–400)
POTASSIUM SERPL-MCNC: 4.6 MMOL/L — SIGNIFICANT CHANGE UP (ref 3.5–5.3)
POTASSIUM SERPL-SCNC: 4.6 MMOL/L — SIGNIFICANT CHANGE UP (ref 3.5–5.3)
RBC # BLD: 3.01 M/UL — LOW (ref 3.8–5.2)
RBC # FLD: 20 % — HIGH (ref 10.3–14.5)
SODIUM SERPL-SCNC: 138 MMOL/L — SIGNIFICANT CHANGE UP (ref 135–145)
WBC # BLD: 7.56 K/UL — SIGNIFICANT CHANGE UP (ref 3.8–10.5)
WBC # FLD AUTO: 7.56 K/UL — SIGNIFICANT CHANGE UP (ref 3.8–10.5)

## 2020-01-11 PROCEDURE — 90937 HEMODIALYSIS REPEATED EVAL: CPT

## 2020-01-11 PROCEDURE — 99232 SBSQ HOSP IP/OBS MODERATE 35: CPT

## 2020-01-11 RX ORDER — ASPIRIN/CALCIUM CARB/MAGNESIUM 324 MG
325 TABLET ORAL DAILY
Refills: 0 | Status: DISCONTINUED | OUTPATIENT
Start: 2020-01-11 | End: 2020-01-11

## 2020-01-11 RX ORDER — HEPARIN SODIUM 5000 [USP'U]/ML
5000 INJECTION INTRAVENOUS; SUBCUTANEOUS EVERY 8 HOURS
Refills: 0 | Status: DISCONTINUED | OUTPATIENT
Start: 2020-01-11 | End: 2020-01-23

## 2020-01-11 RX ORDER — ASPIRIN/CALCIUM CARB/MAGNESIUM 324 MG
81 TABLET ORAL DAILY
Refills: 0 | Status: DISCONTINUED | OUTPATIENT
Start: 2020-01-11 | End: 2020-01-23

## 2020-01-11 RX ADMIN — PANTOPRAZOLE SODIUM 40 MILLIGRAM(S): 20 TABLET, DELAYED RELEASE ORAL at 18:23

## 2020-01-11 RX ADMIN — Medication 1 APPLICATION(S): at 06:07

## 2020-01-11 RX ADMIN — Medication 81 MILLIGRAM(S): at 14:04

## 2020-01-11 RX ADMIN — CHLORHEXIDINE GLUCONATE 1 APPLICATION(S): 213 SOLUTION TOPICAL at 06:07

## 2020-01-11 RX ADMIN — MORPHINE SULFATE 1 MILLIGRAM(S): 50 CAPSULE, EXTENDED RELEASE ORAL at 04:40

## 2020-01-11 RX ADMIN — CHLORHEXIDINE GLUCONATE 1 APPLICATION(S): 213 SOLUTION TOPICAL at 06:06

## 2020-01-11 RX ADMIN — HEPARIN SODIUM 5000 UNIT(S): 5000 INJECTION INTRAVENOUS; SUBCUTANEOUS at 13:11

## 2020-01-11 RX ADMIN — PANTOPRAZOLE SODIUM 40 MILLIGRAM(S): 20 TABLET, DELAYED RELEASE ORAL at 06:07

## 2020-01-11 RX ADMIN — Medication 3 MILLILITER(S): at 15:49

## 2020-01-11 RX ADMIN — SODIUM CHLORIDE 20 MILLILITER(S): 9 INJECTION INTRAMUSCULAR; INTRAVENOUS; SUBCUTANEOUS at 14:04

## 2020-01-11 RX ADMIN — Medication 3 MILLILITER(S): at 20:43

## 2020-01-11 RX ADMIN — MORPHINE SULFATE 1 MILLIGRAM(S): 50 CAPSULE, EXTENDED RELEASE ORAL at 03:54

## 2020-01-11 RX ADMIN — ERYTHROPOIETIN 10000 UNIT(S): 10000 INJECTION, SOLUTION INTRAVENOUS; SUBCUTANEOUS at 09:32

## 2020-01-11 RX ADMIN — MORPHINE SULFATE 1 MILLIGRAM(S): 50 CAPSULE, EXTENDED RELEASE ORAL at 13:40

## 2020-01-11 RX ADMIN — HEPARIN SODIUM 5000 UNIT(S): 5000 INJECTION INTRAVENOUS; SUBCUTANEOUS at 21:57

## 2020-01-11 RX ADMIN — MORPHINE SULFATE 1 MILLIGRAM(S): 50 CAPSULE, EXTENDED RELEASE ORAL at 13:10

## 2020-01-11 NOTE — PROGRESS NOTE ADULT - PROBLEM SELECTOR PROBLEM 2
Acute GI bleeding
Gastrointestinal hemorrhage associated with gastritis, unspecified gastritis type
Sacral wound
Gastrointestinal hemorrhage associated with gastritis, unspecified gastritis type

## 2020-01-11 NOTE — PROGRESS NOTE ADULT - SUBJECTIVE AND OBJECTIVE BOX
[facility] DIVISION OF KIDNEY DISEASES AND HYPERTENSION -- HEMODIALYSIS NOTE  --------------------------------------------------------------------------------  Chief Complaint: ESRD/Ongoing hemodialysis requirement    24 hour events/subjective:        PAST HISTORY  --------------------------------------------------------------------------------  No significant changes to PMH, PSH, FHx, SHx, unless otherwise noted    ALLERGIES & MEDICATIONS  --------------------------------------------------------------------------------  Allergies    ACE inhibitors (Other (Mild))    Intolerances    oxycodone (Sedation/Somnol)    Standing Inpatient Medications  albuterol/ipratropium for Nebulization. 3 milliLiter(s) Nebulizer every 6 hours  aspirin  chewable 81 milliGRAM(s) Oral daily  chlorhexidine 2% Cloths 1 Application(s) Topical <User Schedule>  chlorhexidine 4% Liquid 1 Application(s) Topical <User Schedule>  collagenase Ointment 1 Application(s) Topical daily  dextrose 5%. 1000 milliLiter(s) IV Continuous <Continuous>  dextrose 50% Injectable 12.5 Gram(s) IV Push once  dextrose 50% Injectable 25 Gram(s) IV Push once  dextrose 50% Injectable 25 Gram(s) IV Push once  epoetin sara Injectable 48859 Unit(s) IV Push <User Schedule>  heparin  Injectable 5000 Unit(s) SubCutaneous every 8 hours  insulin lispro (HumaLOG) corrective regimen sliding scale   SubCutaneous every 6 hours  lactulose Retention Enema 200 Gram(s) Rectal two times a day  pantoprazole  Injectable 40 milliGRAM(s) IV Push two times a day  sodium chloride 0.9%. 1000 milliLiter(s) IV Continuous <Continuous>    PRN Inpatient Medications  acetaminophen  Suppository .. 650 milliGRAM(s) Rectal every 6 hours PRN  dextrose 40% Gel 15 Gram(s) Oral once PRN  glucagon  Injectable 1 milliGRAM(s) IntraMuscular once PRN  morphine  - Injectable 1 milliGRAM(s) SubCutaneous every 4 hours PRN  sodium chloride 0.9% lock flush 10 milliLiter(s) IV Push every 1 hour PRN  traMADol 25 milliGRAM(s) Oral every 6 hours PRN      REVIEW OF SYSTEMS  --------------------------------------------------------------------------------  Gen: No weight changes, fatigue, fevers/chills, weakness  Skin: No rashes  Head/Eyes/Ears/Mouth: No headache; Normal hearing; Normal vision w/o blurriness; No sinus pain/discomfort, sore throat  Respiratory: No dyspnea, cough, wheezing, hemoptysis  CV: No chest pain, PND, orthopnea  GI: No abdominal pain, diarrhea, constipation, nausea, vomiting, melena, hematochezia  : No increased frequency, dysuria, hematuria, nocturia  MSK: No joint pain/swelling; no back pain; no edema  Neuro: No dizziness/lightheadedness, weakness, seizures, numbness, tingling  Heme: No easy bruising or bleeding  Endo: No heat/cold intolerance  Psych: No significant nervousness, anxiety, stress, depression    All other systems were reviewed and are negative, except as noted.    VITALS/PHYSICAL EXAM  --------------------------------------------------------------------------------  T(C): 36.8 (01-11-20 @ 11:40), Max: 36.9 (01-11-20 @ 08:00)  HR: 86 (01-11-20 @ 11:40) (80 - 95)  BP: 113/52 (01-11-20 @ 11:40) (93/46 - 130/69)  RR: 18 (01-11-20 @ 11:40) (18 - 18)  SpO2: 99% (01-11-20 @ 11:40) (94% - 99%)  Wt(kg): --        01-10-20 @ 07:01  -  01-11-20 @ 07:00  --------------------------------------------------------  IN: 0 mL / OUT: 50 mL / NET: -50 mL    01-11-20 @ 07:01  -  01-11-20 @ 14:35  --------------------------------------------------------  IN: 0 mL / OUT: 1400 mL / NET: -1400 mL      Physical Exam:  	Gen: NAD, well-appearing  	HEENT: PERRL, supple neck, clear oropharynx  	Pulm: CTA B/L  	CV: RRR, S1S2; no rub  	Back: No spinal or CVA tenderness; no sacral edema  	Abd: +BS, soft, nontender/nondistended  	: No suprapubic tenderness  	UE: Warm, FROM, no clubbing, intact strength; no edema; no asterixis  	LE: Warm, FROM, no clubbing, intact strength; no edema  	Neuro: No focal deficits, intact gait  	Psych: Normal affect and mood  	Skin: Warm, without rashes  	Vascular access:    LABS/STUDIES  --------------------------------------------------------------------------------              9.2    7.56  >-----------<  271      [01-11-20 @ 06:45]              27.4     138  |  100  |  30.0  ----------------------------<  111      [01-11-20 @ 06:45]  4.6   |  24.0  |  3.01        Ca     7.8     [01-11-20 @ 06:45]      PT/INR: PT 14.8 , INR 1.28       [01-10-20 @ 07:39]      Iron 25, TIBC 154, %sat 16      [01-02-20 @ 09:39]  Ferritin 668      [01-02-20 @ 09:39]  PTH -- (Ca 8.0)      [01-02-20 @ 17:06]   119  PTH -- (Ca 9.1)      [05-30-19 @ 18:09]   107  PTH -- (Ca 7.9)      [01-17-19 @ 16:26]   175  PTH -- (Ca 8.1)      [01-16-19 @ 16:55]   148  Vitamin D (25OH) 25.2      [01-02-20 @ 17:06]  HbA1c 5.8      [12-26-19 @ 09:04]  TSH 1.10      [05-17-19 @ 12:21]    HBsAb 18.0      [12-26-19 @ 22:01]  HBsAg Nonreact      [12-26-19 @ 22:01]  HBcAb Nonreact      [12-26-19 @ 22:01]  HCV 0.20, Nonreact      [12-26-19 @ 22:01]

## 2020-01-11 NOTE — PROGRESS NOTE ADULT - ASSESSMENT
65 yo female with hx of CHFpEF, CAD, CKD, HTN, DM, cirrhosis, portal gastropathy, recurrent GI bleed, blind, sacral decub ulcer, presented to the ED from nursing home with encephalopathy, hypoxic resp failure requiring bipap, in ED found to have anemia with Hb 5 that responded to blood transfusions (hx of recurrent GI bleed for which multiple EGDs/colonoscopies have been performed, no further invasive work up as per GI), sepsis secondary to PNA on abx, KEITH on CKD now requiring HD via left IJ. tunnelled cath palced for access on 1/3/20. PT failed s/s and is NPO. NGT feeding was started  and family to make decisions initialy re forther care PEG , now opting for PEG . Had upper ext mapping US showed left barchial veins DVT around the cathater and superficial right basilic and left cephalic vein thrombosis which vascular rec full naticoagulation if not contraindicated due to GI bleed on admission ;discussed with GI to hold on full dose anticoagulation for now , abx restarted for worsening pulmonary infiltrate and congestion , failed repeat swallow evaluation and MBS , PEG insertion per GI now daughter consenting , anemic blood tx given ,    S/P PEG,     S/P  debridement bedside ( Sacral Ulcer )     R - Pneumonia Improved,     Bilateral superficial vein thrombosis on US doppler of upper ext and DVT on left brachial  vein around the cathater     Acute on chronic diastolic CHF/ Acute hypoxic respiratory failure- improving      Acute blood loss Anemia - On Protonix daily     CASTAÑEDA Cirrhosis with portal gastropathy and AVMs    DM2- On  sliding scale insulin coverage     Well controlled

## 2020-01-11 NOTE — PROGRESS NOTE ADULT - SUBJECTIVE AND OBJECTIVE BOX
CC : cough , lower back pain     pt is seen in am during HD   pt is resting no pain reported , controlled pain at present       Vital Signs Last 24 Hrs  T(C): 36.9 (11 Jan 2020 08:00), Max: 36.9 (11 Jan 2020 08:00)  T(F): 98.5 (11 Jan 2020 08:00), Max: 98.5 (11 Jan 2020 08:00)  HR: 95 (11 Jan 2020 08:00) (80 - 95)  BP: 93/46 (11 Jan 2020 08:00) (93/46 - 130/69)  BP(mean): --  RR: 18 (11 Jan 2020 08:00) (18 - 18)  SpO2: 95% (11 Jan 2020 08:00) (94% - 95%)    PHYSICAL EXAM-  GENERAL: no distress , lying  in the bed   CHEST/LUNG: bilateral rhonchi , fair air entry   HEART: Regular rate and rhythm; S1/s2 No murmurs  ABDOMEN: Soft, Nontender, Nondistended; Bowel sounds present  EXTREMITIES:  No clubbing, bilateral  heel protector in place                                             9.2    7.56  )-----------( 271      ( 11 Jan 2020 06:45 )             27.4   01-11    138  |  100  |  30.0<H>  ----------------------------<  111  4.6   |  24.0  |  3.01<H>    Ca    7.8<L>      11 Jan 2020 06:45                IMPRESSION:  1.  Nonocclusive deep vein thrombosis in one of the duplicated left brachial veins surrounding a catheter.  2.  No evidence of deep vein thrombosis in the right upper extremity.  3.  Superficial venous thrombosis involving the right basilic vein in the upper arm and the left cephalic vein in the upper arm.  4.  Cephalic and basilic vein measurements as described.       < end of copied text >                       CAPILLARY BLOOD GLUCOSE      POCT Blood Glucose.: 111 mg/dL (11 Jan 2020 05:52)  POCT Blood Glucose.: 99 mg/dL (10 Noah 2020 23:53)  POCT Blood Glucose.: 110 mg/dL (10 Noah 2020 16:28)

## 2020-01-11 NOTE — PROGRESS NOTE ADULT - ASSESSMENT
63 yo female with hx of CHFpEF, CAD, CKD, HTN, DM, cirrhosis, portal gastropathy, recurrent GI bleed, blind, sacral decub ulcer, presented to the ED from nursing home with encephalopathy, hypoxic resp failure requiring bipap, in ED found to have anemia with Hb 5 that responded to blood transfusions (hx of recurrent GI bleed for which multiple EGDs/colonoscopies have been performed, no further invasive work up as per GI), sepsis secondary to PNA on abx, KEITH on CKD now requiring HD via left IJ. tunnelled cath palced for access on 1/3/20. PT failed s/s and is NPO. NGT feeding was started  and family to make decisions initialy re forther care PEG , now opting for PEG . Had upper ext mapping US showed left barchial veins DVT around the cathater and superficial right basilic and left cephalic vein thrombosis which vascular rec full naticoagulation if not contraindicated due to GI bleed on admission ;discussed with GI to hold on full dose anticoagulation for now , abx restarted for worsening pulmonary infiltrate and congestion , failed repeat swallow evaluation and MBS , PEG insertion per GI now daughter consenting , anemic blood tx given ,    1- Oropharyngeal dysphagia   failed MBS abd swallow evaluation  s/p PEG restart feed once ron by GI     2- Un stageable Sacral Decub  s/p debridement bedside x 2 by surgery   (family initial refused debr in the OR now reconsidering )  d/w surgery team last weekend , no debr planned at present   reevaluate as needed   cont daily wound care , repositioning     3- Right pneumonia - suspected  aspiration  treated initially abx restarted  for worsening infiltrate / CXR   short course completed       4- bilateral superficial vein thrombosis on US doppler of upper ext and DVT on left brachial  vein around the cathater   vascular rec anticoagulation  due to GI bleed will hold off ;   d/w GI defer full dose anticoagulation now pending PEG and cont to monitor for drop in hb   poor candidate for full anticoagulation due to GI bleed and drop in hb   cont aspirin , heparin sq for DVT prophylaxis       5-Acute on chronic diastolic CHF/ Acute hypoxic respiratory failure- improving   cont HD for fluid removal   iv lasix as needed   No longer requiring bipap  on NC comfortable     6-Acute blood loss Anemia   Hx of recurrent GI bleed? prior EGDs/ colonoscopies this year which were negative, prior CTA with +transverse colon bleed  Seen by GI on this admission no further work up   Responded to blood transfusions   repeat hb low today no overt bleed , blood tx to keep hb over 8    cont Protonix daily     7- s/p Metabolic vs Uremic vs Hepatic Encephalopathy  Likely multifactorial - Hepatic, Uremic.   Uremia has resolved, neurologic work up is negative   stable     8-Known CASTAÑEDA Cirrhosis with portal gastropathy and AVMs  Lactulose and rifaximin  cont PPI     9-KEITH/ATN on CKD- started on new HD   cont HD per nephrology   dailysis seat in the community   l  10-DM2- cont sliding scale insulin coverage   Well controlled  Cont sliding scale insulin coverage

## 2020-01-11 NOTE — PROGRESS NOTE ADULT - SUBJECTIVE AND OBJECTIVE BOX
Pt seen and examined f/u for oropharyngeal dysphagia with PEG performedyesterday    This morning she denies any abdominal pain, nausea or vomiting.    REVIEW OF SYSTEMS:    CONSTITUTIONAL: No fever, weight loss, or fatigue  EYES: No eye pain, visual disturbances, or discharge  ENMT:  No difficulty hearing, tinnitus, vertigo; No sinus or throat pain  RESPIRATORY: No cough, wheezing, chills or hemoptysis; No shortness of breath  CARDIOVASCULAR: No chest pain, palpitations, dizziness, or leg swelling  GASTROINTESTINAL: No abdominal or epigastric pain. No nausea, vomiting, or hematemesis; No diarrhea or constipation. No melena or hematochezia.    MEDICATIONS:  MEDICATIONS  (STANDING):  albuterol/ipratropium for Nebulization. 3 milliLiter(s) Nebulizer every 6 hours  aspirin enteric coated 325 milliGRAM(s) Oral daily  chlorhexidine 2% Cloths 1 Application(s) Topical <User Schedule>  chlorhexidine 4% Liquid 1 Application(s) Topical <User Schedule>  collagenase Ointment 1 Application(s) Topical daily  dextrose 5%. 1000 milliLiter(s) (50 mL/Hr) IV Continuous <Continuous>  dextrose 50% Injectable 12.5 Gram(s) IV Push once  dextrose 50% Injectable 25 Gram(s) IV Push once  dextrose 50% Injectable 25 Gram(s) IV Push once  epoetin sara Injectable 51292 Unit(s) IV Push <User Schedule>  heparin  Injectable 5000 Unit(s) SubCutaneous every 8 hours  insulin lispro (HumaLOG) corrective regimen sliding scale   SubCutaneous every 6 hours  lactulose Retention Enema 200 Gram(s) Rectal two times a day  pantoprazole  Injectable 40 milliGRAM(s) IV Push two times a day  sodium chloride 0.9%. 1000 milliLiter(s) (20 mL/Hr) IV Continuous <Continuous>    MEDICATIONS  (PRN):  acetaminophen  Suppository .. 650 milliGRAM(s) Rectal every 6 hours PRN Temp greater or equal to 38C (100.4F), Mild Pain (1 - 3)  dextrose 40% Gel 15 Gram(s) Oral once PRN Blood Glucose LESS THAN 70 milliGRAM(s)/deciLiter  glucagon  Injectable 1 milliGRAM(s) IntraMuscular once PRN Glucose <70 milliGRAM(s)/deciLiter  morphine  - Injectable 1 milliGRAM(s) SubCutaneous every 4 hours PRN Moderate Pain (4 - 6)  sodium chloride 0.9% lock flush 10 milliLiter(s) IV Push every 1 hour PRN Pre/post blood products, medications, blood draw, and to maintain line patency  traMADol 25 milliGRAM(s) Oral every 6 hours PRN Moderate Pain (4 - 6)      Allergies    ACE inhibitors (Other (Mild))    Intolerances    oxycodone (Sedation/Somnol)      Vital Signs Last 24 Hrs  T(C): 36.8 (11 Jan 2020 11:40), Max: 36.9 (11 Jan 2020 08:00)  T(F): 98.3 (11 Jan 2020 11:40), Max: 98.5 (11 Jan 2020 08:00)  HR: 86 (11 Jan 2020 11:40) (80 - 95)  BP: 113/52 (11 Jan 2020 11:40) (93/46 - 130/69)  BP(mean): --  RR: 18 (11 Jan 2020 11:40) (18 - 18)  SpO2: 99% (11 Jan 2020 11:40) (94% - 99%)    01-10 @ 07:01 - 01-11 @ 07:00  --------------------------------------------------------  IN: 0 mL / OUT: 50 mL / NET: -50 mL    01-11 @ 07:01 - 01-11 @ 13:14  --------------------------------------------------------  IN: 0 mL / OUT: 1400 mL / NET: -1400 mL        PHYSICAL EXAM:    General: obese female in no acute distress  HEENT: MMM, conjunctiva and sclera clear, opacified cornea on right  Gastrointestinal:Abdomen: Soft non-tender non-distended; Normal bowel sounds; No hepatosplenomegaly, bandage around g tube is bloody and removed. The g tube site is clean with no erythema, bleeding or discharge. Three sutures present.  Extremities: no cyanosis, clubbing or edema.  Skin: Warm and dry. No obvious rash    LABS:      CBC Full  -  ( 11 Jan 2020 06:45 )  WBC Count : 7.56 K/uL  RBC Count : 3.01 M/uL  Hemoglobin : 9.2 g/dL  Hematocrit : 27.4 %  Platelet Count - Automated : 271 K/uL  Mean Cell Volume : 91.0 fl  Mean Cell Hemoglobin : 30.6 pg  Mean Cell Hemoglobin Concentration : 33.6 gm/dL  Auto Neutrophil # : x  Auto Lymphocyte # : x  Auto Monocyte # : x  Auto Eosinophil # : x  Auto Basophil # : x  Auto Neutrophil % : x  Auto Lymphocyte % : x  Auto Monocyte % : x  Auto Eosinophil % : x  Auto Basophil % : x    01-11    138  |  100  |  30.0<H>  ----------------------------<  111  4.6   |  24.0  |  3.01<H>    Ca    7.8<L>      11 Jan 2020 06:45      PT/INR - ( 10 Noah 2020 07:39 )   PT: 14.8 sec;   INR: 1.28 ratio

## 2020-01-11 NOTE — PROGRESS NOTE ADULT - PROBLEM SELECTOR PLAN 1
Stable s/p G tube insertion with some minor bleeding after which has stopped. Will restart g tube feeds with nepro and decrease IV fluids to 20ml/hr.

## 2020-01-11 NOTE — CHART NOTE - NSCHARTNOTEFT_GEN_A_CORE
Source: Patient [ ]  Family [ ]   other [ x]EMR    Current Diet: NPO Peg inserted yesterday      Enteral /Parenteral Nutrition:     Current Weight: 1/4 178#, 1/2 187# (12/29) 198.6#, 1/7 148#, 1/9 167#, 1/11 165#  (12/28) 214.5#  (12/27) 209.8#  (12/26) 214.9#  -Question accuracy of weights, will continue to monitor. Weight fluctuations could be due to fluid fluctuations, pt receiving HD. Generalized moderate edema noted per EMR.        Pertinent Medications: MEDICATIONS  (STANDING):  albuterol/ipratropium for Nebulization. 3 milliLiter(s) Nebulizer every 6 hours  aspirin enteric coated 325 milliGRAM(s) Oral daily  chlorhexidine 2% Cloths 1 Application(s) Topical <User Schedule>  chlorhexidine 4% Liquid 1 Application(s) Topical <User Schedule>  collagenase Ointment 1 Application(s) Topical daily  dextrose 5%. 1000 milliLiter(s) (50 mL/Hr) IV Continuous <Continuous>  dextrose 50% Injectable 12.5 Gram(s) IV Push once  dextrose 50% Injectable 25 Gram(s) IV Push once  dextrose 50% Injectable 25 Gram(s) IV Push once  epoetin sara Injectable 90127 Unit(s) IV Push <User Schedule>  heparin  Injectable 5000 Unit(s) SubCutaneous every 8 hours  insulin lispro (HumaLOG) corrective regimen sliding scale   SubCutaneous every 6 hours  lactulose Retention Enema 200 Gram(s) Rectal two times a day  pantoprazole  Injectable 40 milliGRAM(s) IV Push two times a day  sodium chloride 0.9%. 1000 milliLiter(s) (20 mL/Hr) IV Continuous <Continuous>    MEDICATIONS  (PRN):  acetaminophen  Suppository .. 650 milliGRAM(s) Rectal every 6 hours PRN Temp greater or equal to 38C (100.4F), Mild Pain (1 - 3)  dextrose 40% Gel 15 Gram(s) Oral once PRN Blood Glucose LESS THAN 70 milliGRAM(s)/deciLiter  glucagon  Injectable 1 milliGRAM(s) IntraMuscular once PRN Glucose <70 milliGRAM(s)/deciLiter  morphine  - Injectable 1 milliGRAM(s) SubCutaneous every 4 hours PRN Moderate Pain (4 - 6)  sodium chloride 0.9% lock flush 10 milliLiter(s) IV Push every 1 hour PRN Pre/post blood products, medications, blood draw, and to maintain line patency  traMADol 25 milliGRAM(s) Oral every 6 hours PRN Moderate Pain (4 - 6)    Pertinent Labs: CBC Full  -  ( 11 Jan 2020 06:45 )  WBC Count : 7.56 K/uL  RBC Count : 3.01 M/uL  Hemoglobin : 9.2 g/dL  Hematocrit : 27.4 %  Platelet Count - Automated : 271 K/uL  Mean Cell Volume : 91.0 fl  Mean Cell Hemoglobin : 30.6 pg  Mean Cell Hemoglobin Concentration : 33.6 gm/dL  Auto Neutrophil # : x  Auto Lymphocyte # : x  Auto Monocyte # : x  Auto Eosinophil # : x  Auto Basophil # : x  Auto Neutrophil % : x  Auto Lymphocyte % : x  Auto Monocyte % : x  Auto Eosinophil % : x  Auto Basophil % : x    01-11 Na138 mmol/L Glu 111 mg/dL K+ 4.6 mmol/L Cr  3.01 mg/dL<H> BUN 30.0 mg/dL<H> Phos n/a   Alb n/a   PAB n/a             Skin: unstageable sacral    Nutrition focused physical exam not conducted - found signs of malnutrition [ ]absent [ ]present    Subcutaneous fat loss: [ ] Orbital fat pads region, [ ]Buccal fat region, [ ]Triceps region,  [ ]Ribs region    Muscle wasting: [ ]Temples region, [ ]Clavicle region, [ ]Shoulder region, [ ]Scapula region, [ ]Interosseous region,  [ ]thigh region, [ ]Calf region    Estimated Needs:   [ x] no change since previous assessment  [ ] recalculated:     Current Nutrition Diagnosis:  Pt remains at high nutrition risk and meets criteria for moderate (chronic) malnutrition related to inability to consume increased protein energy intake while NPO in setting of unstageable sacral wound, hypovolemic shock for possible GI bleed/blood loss anemia as evidenced by pt meeting <75% estimated energy intake > 1 month and moderate edema.   Pt failed speech and swallow eval and is NPO with s/p Peg x 1 day.     Recommendations:     1)initiate TF Nepro at 40cchr and advance to 60cchr to provide 1200cc, 2160kcal, 97gr pro  2) Monitor weights and labs  3) RX: Neprovite and vitamin C (500mg) daily        Monitoring and Evaluation:   [ ] PO intake [x ] Tolerance to diet prescription [X] Weights  [X] Follow up per protocol [X] Labs:

## 2020-01-12 LAB
GLUCOSE BLDC GLUCOMTR-MCNC: 153 MG/DL — HIGH (ref 70–99)
GLUCOSE BLDC GLUCOMTR-MCNC: 154 MG/DL — HIGH (ref 70–99)
GLUCOSE BLDC GLUCOMTR-MCNC: 176 MG/DL — HIGH (ref 70–99)
GLUCOSE BLDC GLUCOMTR-MCNC: 222 MG/DL — HIGH (ref 70–99)
GLUCOSE BLDC GLUCOMTR-MCNC: 243 MG/DL — HIGH (ref 70–99)

## 2020-01-12 PROCEDURE — 99231 SBSQ HOSP IP/OBS SF/LOW 25: CPT

## 2020-01-12 PROCEDURE — 99232 SBSQ HOSP IP/OBS MODERATE 35: CPT

## 2020-01-12 PROCEDURE — 99233 SBSQ HOSP IP/OBS HIGH 50: CPT

## 2020-01-12 RX ADMIN — PANTOPRAZOLE SODIUM 40 MILLIGRAM(S): 20 TABLET, DELAYED RELEASE ORAL at 17:08

## 2020-01-12 RX ADMIN — Medication 2: at 05:50

## 2020-01-12 RX ADMIN — MORPHINE SULFATE 1 MILLIGRAM(S): 50 CAPSULE, EXTENDED RELEASE ORAL at 08:11

## 2020-01-12 RX ADMIN — CHLORHEXIDINE GLUCONATE 1 APPLICATION(S): 213 SOLUTION TOPICAL at 05:42

## 2020-01-12 RX ADMIN — MORPHINE SULFATE 1 MILLIGRAM(S): 50 CAPSULE, EXTENDED RELEASE ORAL at 13:39

## 2020-01-12 RX ADMIN — Medication 81 MILLIGRAM(S): at 11:38

## 2020-01-12 RX ADMIN — Medication 1 APPLICATION(S): at 05:41

## 2020-01-12 RX ADMIN — Medication 3 MILLILITER(S): at 09:05

## 2020-01-12 RX ADMIN — Medication 2: at 00:48

## 2020-01-12 RX ADMIN — Medication 4: at 23:01

## 2020-01-12 RX ADMIN — HEPARIN SODIUM 5000 UNIT(S): 5000 INJECTION INTRAVENOUS; SUBCUTANEOUS at 05:44

## 2020-01-12 RX ADMIN — HEPARIN SODIUM 5000 UNIT(S): 5000 INJECTION INTRAVENOUS; SUBCUTANEOUS at 22:01

## 2020-01-12 RX ADMIN — PANTOPRAZOLE SODIUM 40 MILLIGRAM(S): 20 TABLET, DELAYED RELEASE ORAL at 05:41

## 2020-01-12 RX ADMIN — Medication 3 MILLILITER(S): at 15:49

## 2020-01-12 RX ADMIN — HEPARIN SODIUM 5000 UNIT(S): 5000 INJECTION INTRAVENOUS; SUBCUTANEOUS at 11:38

## 2020-01-12 RX ADMIN — Medication 4: at 17:08

## 2020-01-12 RX ADMIN — Medication 3 MILLILITER(S): at 21:16

## 2020-01-12 RX ADMIN — Medication 2: at 11:38

## 2020-01-12 RX ADMIN — CHLORHEXIDINE GLUCONATE 1 APPLICATION(S): 213 SOLUTION TOPICAL at 05:43

## 2020-01-12 RX ADMIN — MORPHINE SULFATE 1 MILLIGRAM(S): 50 CAPSULE, EXTENDED RELEASE ORAL at 08:19

## 2020-01-12 RX ADMIN — MORPHINE SULFATE 1 MILLIGRAM(S): 50 CAPSULE, EXTENDED RELEASE ORAL at 13:50

## 2020-01-12 NOTE — PROGRESS NOTE ADULT - PROBLEM SELECTOR PROBLEM 1
Acute GI bleeding
Acute GI bleeding
Oropharyngeal dysphagia
Pharyngeal dysphagia
Pharyngeal dysphagia

## 2020-01-12 NOTE — PROGRESS NOTE ADULT - SUBJECTIVE AND OBJECTIVE BOX
Vital Signs Last 24 Hrs,    T(C): 36.9 (12 Jan 2020 16:00), Max: 37 (11 Jan 2020 22:45)  T(F): 98.4 (12 Jan 2020 16:00), Max: 98.6 (11 Jan 2020 22:45)  HR: 90 (12 Jan 2020 16:00) (83 - 91)  BP: 103/60 (12 Jan 2020 16:00) (88/51 - 127/64)  RR: 18 (12 Jan 2020 16:00) (18 - 18)  SpO2: 98% (12 Jan 2020 16:00) (93% - 98%)    138    |  100    |  30.0<H>  ----------------------------<  111    Ca:7.8<L> (11 Jan 2020 06:45)  4.6     |  24.0   |  3.01<H>                         9.2<L>  7.56  )-----------( 271      ( 11 Jan 2020 06:45 )             27.4<L>    Phos:3.2 mg/dL .,    Chief Complaint: ESRD/Ongoing hemodialysis requirement, TIW ( [facility] )     24 hour events/subjective: More alert , Mildly demented,    PAST HISTORY  --------------------------------------------------------------------------------  No significant changes to PMH, PSH, FHx, SHx, unless otherwise noted    ALLERGIES & MEDICATIONS  --------------------------------------------------------------------------------  Allergies    ACE inhibitors (Other (Mild))    Intolerances    oxycodone (Sedation/Somnol)    Standing Inpatient Medications  albuterol/ipratropium for Nebulization. 3 milliLiter(s) Nebulizer every 6 hours  aspirin  chewable 81 milliGRAM(s) Oral daily  chlorhexidine 2% Cloths 1 Application(s) Topical <User Schedule>  chlorhexidine 4% Liquid 1 Application(s) Topical <User Schedule>  collagenase Ointment 1 Application(s) Topical daily  dextrose 5%. 1000 milliLiter(s) IV Continuous <Continuous>  dextrose 50% Injectable 12.5 Gram(s) IV Push once  dextrose 50% Injectable 25 Gram(s) IV Push once  dextrose 50% Injectable 25 Gram(s) IV Push once  epoetin sara Injectable 12328 Unit(s) IV Push <User Schedule>  heparin  Injectable 5000 Unit(s) SubCutaneous every 8 hours  insulin lispro (HumaLOG) corrective regimen sliding scale   SubCutaneous every 6 hours  lactulose Retention Enema 200 Gram(s) Rectal two times a day  pantoprazole  Injectable 40 milliGRAM(s) IV Push two times a day  sodium chloride 0.9%. 1000 milliLiter(s) IV Continuous <Continuous>    PRN Inpatient Medications  acetaminophen  Suppository .. 650 milliGRAM(s) Rectal every 6 hours PRN  dextrose 40% Gel 15 Gram(s) Oral once PRN  glucagon  Injectable 1 milliGRAM(s) IntraMuscular once PRN  morphine  - Injectable 1 milliGRAM(s) SubCutaneous every 4 hours PRN  sodium chloride 0.9% lock flush 10 milliLiter(s) IV Push every 1 hour PRN  traMADol 25 milliGRAM(s) Oral every 6 hours PRN    REVIEW OF SYSTEMS  --------------------------------------------------------------------------------  Gen: +  weight changes, fatigue,  No  fever/chills, weakness  Skin: No rashes  Head/Eyes/Ears/Mouth: No headache; Normal hearing; Normal vision w/o blurriness; No sinus pain/discomfort, sore throat  Respiratory: No dyspnea, cough, wheezing, hemoptysis  CV: No chest pain, PND, orthopnea  GI: No abdominal pain, diarrhea, constipation, nausea, vomiting, melena, hematochezia  : No increased frequency, dysuria, hematuria, nocturia  MSK: No joint pain/swelling; no back pain; no edema  Neuro: No dizziness/lightheadedness, weakness, seizures, numbness, tingling  Heme: No easy bruising or bleeding  Endo: No heat/cold intolerance  Psych: No significant nervousness, anxiety, stress, depression    All other systems were reviewed and are negative, except as noted.    VITALS/PHYSICAL EXAM  --------------------------------------------------------------------------------  T(C): 36.8 (01-11-20 @ 11:40), Max: 36.9 (01-11-20 @ 08:00)  HR: 86 (01-11-20 @ 11:40) (80 - 95)  BP: 113/52 (01-11-20 @ 11:40) (93/46 - 130/69)  RR: 18 (01-11-20 @ 11:40) (18 - 18)  SpO2: 99% (01-11-20 @ 11:40) (94% - 99%)    01-10-20 @ 07:01  -  01-11-20 @ 07:00  --------------------------------------------------------  IN: 0 mL / OUT: 50 mL / NET: -50 mL    01-11-20 @ 07:01  -  01-11-20 @ 14:35  --------------------------------------------------------  IN: 0 mL / OUT: 1400 mL / NET: -1400 mL    Physical Exam:  	Gen: NAD, ill-appearing, Pale,   	HEENT: Supple neck,   	Pulm: CTA B/L  	CV: RRR, S1S2; no rub  	Back: No spinal or CVA tenderness; no sacral edema  	Abd: +BS, soft, nontender/nondistended , + PEG,   	: No suprapubic tenderness  	UE: Warm, FROM, no clubbing, intact strength; no edema; no asterixis  	LE: Warm, FROM, no clubbing, intact strength; no edema  	Neuro: No focal deficits,   	Psych: Normal affect and mood  	Skin: Warm, without rashes  	Vascular access: NTDC,     LABS/STUDIES  --------------------------------------------------------------------------------              9.2    7.56  >-----------<  271      [01-11-20 @ 06:45]              27.4     138  |  100  |  30.0  ----------------------------<  111      [01-11-20 @ 06:45]  4.6   |  24.0  |  3.01        Ca     7.8     [01-11-20 @ 06:45]    PT/INR: PT 14.8 , INR 1.28       [01-10-20 @ 07:39]    Iron 25, TIBC 154, %sat 16      [01-02-20 @ 09:39]  Ferritin 668      [01-02-20 @ 09:39]  PTH -- (Ca 8.0)      [01-02-20 @ 17:06]   119  PTH -- (Ca 9.1)      [05-30-19 @ 18:09]   107  PTH -- (Ca 7.9)      [01-17-19 @ 16:26]   175  PTH -- (Ca 8.1)      [01-16-19 @ 16:55]   148  Vitamin D (25OH) 25.2      [01-02-20 @ 17:06]  HbA1c 5.8      [12-26-19 @ 09:04]  TSH 1.10      [05-17-19 @ 12:21]    HBsAb 18.0      [12-26-19 @ 22:01]  HBsAg Nonreact      [12-26-19 @ 22:01]  HBcAb Nonreact      [12-26-19 @ 22:01]  HCV 0.20, Nonreact      [12-26-19 @ 22:01]                         63 yo female with hx of CHFpEF, CAD, CKD, HTN, DM, cirrhosis, portal gastropathy, recurrent GI bleed, blind, sacral decub ulcer, presented to the ED from nursing home with encephalopathy, hypoxic resp failure requiring bipap, in ED found to have anemia with Hb 5 that responded to blood transfusions (hx of recurrent GI bleed for which multiple EGDs/colonoscopies have been performed, no further invasive work up as per GI), sepsis secondary to PNA on abx, KEITH on CKD now requiring HD via left IJ. tunnelled cath palced for access on 1/3/20. PT failed s/s and is NPO. NGT feeding was started  and family to make decisions initialy re forther care PEG , now opting for PEG . Had upper ext mapping US showed left barchial veins DVT around the cathater and superficial right basilic and left cephalic vein thrombosis which vascular rec full naticoagulation if not contraindicated due to GI bleed on admission ;discussed with GI to hold on full dose anticoagulation for now , abx restarted for worsening pulmonary infiltrate and congestion , failed repeat swallow evaluation and MBS , PEG insertion per GI now daughter consenting , anemic blood tx given ,    S/P PEG,     S/P  debridement bedside ( Sacral Ulcer )     R - Pneumonia Improved,     Bilateral superficial vein thrombosis on US doppler of upper ext and DVT on left brachial  vein around the cathater     Acute on chronic diastolic CHF/ Acute hypoxic respiratory failure- improving      Acute blood loss Anemia - On Protonix daily     CASTAÑEDA Cirrhosis with portal gastropathy and AVMs    DM2- On  sliding scale insulin coverage     Well controlled

## 2020-01-12 NOTE — PROGRESS NOTE ADULT - ASSESSMENT
65 yo female with hx of CHFpEF, CAD, CKD, HTN, DM, cirrhosis, portal gastropathy, recurrent GI bleed, blind, sacral decub ulcer, presented to the ED from nursing home with encephalopathy, hypoxic resp failure requiring bipap, in ED found to have anemia with Hb 5 that responded to blood transfusions (hx of recurrent GI bleed for which multiple EGDs/ colonoscopies have been performed, no further invasive work up as per GI), sepsis secondary to PNA on abx, KEITH on CKD now requiring HD via left IJ. tunnelled cath palced for access on 1/3/20. PT failed s/s and is NPO. NGT feeding was started  and family to make decisions initialy re forther care PEG , now opting for PEG . Had upper ext mapping US showed left barchial veins DVT around the cathater and superficial right basilic and left cephalic vein thrombosis which vascular rec full naticoagulation if not contraindicated due to GI bleed on admission ;discussed with GI to hold on full dose anticoagulation for now , abx restarted for worsening pulmonary infiltrate and congestion , failed repeat swallow evaluation and MBS , s/p PEG insertion 1/10 peg feeding started , cont HD     1- Oropharyngeal dysphagia   failed MBS , abd swallow evaluation  s/p PEG , tolerating feeding   advance as tolerated,     2- Un stageable Sacral Decub  s/p debridement bedside x 2 by surgery   (family initially refused joss in the OR now reconsidering )   cont daily wound care , repositioning     3- Right pneumonia - suspected  aspiration  treated initially abx restarted  for worsening infiltrate / CXR   short course treatment second course  completed     4- bilateral superficial vein thrombosis on US doppler of upper ext and DVT on left brachial  vein around the catheter       5-Acute on chronic diastolic CHF/ Acute hypoxic respiratory failure- improving   cont HD for fluid removal   On NC,     6-Acute blood loss Anemia   Hx of recurrent GI bleed? prior EGDs/ colonoscopies this year which were negative, prior CTA with + transverse colon bleed  cont Protonix daily, Transfusion PRN,      7- s/p Metabolic vs Uremic vs Hepatic Encephalopathy  Likely multifactorial - Hepatic, Uremic.   Uremia has resolved, neurologic work up is negative   stable     8-Known CASTAÑEDA Cirrhosis with portal gastropathy and AVMs  on Lactulose  and rifaximin  cont PPI , F.U NH3+,    9-KEITH/ATN on CKD- started on new HD , Likely now ESRD,     10-DM2- cont sliding scale insulin coverage ,    Well controlled  Cont sliding scale insulin coverage     Eventual discharge  to [facility] ,    Needs AVF,

## 2020-01-12 NOTE — PROGRESS NOTE ADULT - SUBJECTIVE AND OBJECTIVE BOX
Pt seen and examined f/u s/p peg insertion    This morning she is tolerating tube feeds at 50ml/hr. Denies any abdominal pain, nausea or vomiting. No bleeding or D/C fro G tube site.    REVIEW OF SYSTEMS:    CONSTITUTIONAL: No fever, weight loss, or fatigue  EYES: No eye pain, visual disturbances, or discharge  ENMT:  No difficulty hearing, tinnitus, vertigo; No sinus or throat pain  RESPIRATORY: No cough, wheezing, chills or hemoptysis; No shortness of breath  CARDIOVASCULAR: No chest pain, palpitations, dizziness, or leg swelling  GASTROINTESTINAL: No abdominal or epigastric pain. No nausea, vomiting, or hematemesis; No diarrhea or constipation. No melena or hematochezia.    MEDICATIONS:  MEDICATIONS  (STANDING):  albuterol/ipratropium for Nebulization. 3 milliLiter(s) Nebulizer every 6 hours  aspirin  chewable 81 milliGRAM(s) Oral daily  chlorhexidine 2% Cloths 1 Application(s) Topical <User Schedule>  chlorhexidine 4% Liquid 1 Application(s) Topical <User Schedule>  collagenase Ointment 1 Application(s) Topical daily  dextrose 5%. 1000 milliLiter(s) (50 mL/Hr) IV Continuous <Continuous>  dextrose 50% Injectable 12.5 Gram(s) IV Push once  dextrose 50% Injectable 25 Gram(s) IV Push once  dextrose 50% Injectable 25 Gram(s) IV Push once  epoetin sara Injectable 91156 Unit(s) IV Push <User Schedule>  heparin  Injectable 5000 Unit(s) SubCutaneous every 8 hours  insulin lispro (HumaLOG) corrective regimen sliding scale   SubCutaneous every 6 hours  lactulose Retention Enema 200 Gram(s) Rectal two times a day  pantoprazole  Injectable 40 milliGRAM(s) IV Push two times a day  sodium chloride 0.9%. 1000 milliLiter(s) (20 mL/Hr) IV Continuous <Continuous>    MEDICATIONS  (PRN):  acetaminophen  Suppository .. 650 milliGRAM(s) Rectal every 6 hours PRN Temp greater or equal to 38C (100.4F), Mild Pain (1 - 3)  dextrose 40% Gel 15 Gram(s) Oral once PRN Blood Glucose LESS THAN 70 milliGRAM(s)/deciLiter  glucagon  Injectable 1 milliGRAM(s) IntraMuscular once PRN Glucose <70 milliGRAM(s)/deciLiter  morphine  - Injectable 1 milliGRAM(s) SubCutaneous every 4 hours PRN Moderate Pain (4 - 6)  sodium chloride 0.9% lock flush 10 milliLiter(s) IV Push every 1 hour PRN Pre/post blood products, medications, blood draw, and to maintain line patency      Allergies    ACE inhibitors (Other (Mild))    Intolerances    oxycodone (Sedation/Somnol)      Vital Signs Last 24 Hrs  T(C): 36.6 (12 Jan 2020 08:00), Max: 37 (11 Jan 2020 22:45)  T(F): 97.8 (12 Jan 2020 08:00), Max: 98.6 (11 Jan 2020 22:45)  HR: 83 (12 Jan 2020 09:07) (83 - 91)  BP: 88/51 (12 Jan 2020 08:00) (88/51 - 127/64)  BP(mean): --  RR: 18 (11 Jan 2020 22:45) (18 - 18)  SpO2: 96% (12 Jan 2020 09:07) (96% - 99%)    01-11 @ 07:01  -  01-12 @ 07:00  --------------------------------------------------------  IN: 0 mL / OUT: 1400 mL / NET: -1400 mL        PHYSICAL EXAM:    General: obese female, well nourished; in no acute distress  HEENT: MMM, conjunctiva and sclera clear  Gastrointestinal:Abdomen: Soft non-tender non-distended; Normal bowel sounds; No hepatosplenomegaly, G tube site is clean without blood, bleeding or discharge.  Extremities: no cyanosis, clubbing or edema.  Skin: Warm and dry. No obvious rash    LABS:      CBC Full  -  ( 11 Jan 2020 06:45 )  WBC Count : 7.56 K/uL  RBC Count : 3.01 M/uL  Hemoglobin : 9.2 g/dL  Hematocrit : 27.4 %  Platelet Count - Automated : 271 K/uL  Mean Cell Volume : 91.0 fl  Mean Cell Hemoglobin : 30.6 pg  Mean Cell Hemoglobin Concentration : 33.6 gm/dL  Auto Neutrophil # : x  Auto Lymphocyte # : x  Auto Monocyte # : x  Auto Eosinophil # : x  Auto Basophil # : x  Auto Neutrophil % : x  Auto Lymphocyte % : x  Auto Monocyte % : x  Auto Eosinophil % : x  Auto Basophil % : x    01-11    138  |  100  |  30.0<H>  ----------------------------<  111  4.6   |  24.0  |  3.01<H>    Ca    7.8<L>      11 Jan 2020 06:45

## 2020-01-12 NOTE — PROGRESS NOTE ADULT - ASSESSMENT
63 yo female with hx of CHFpEF, CAD, CKD, HTN, DM, cirrhosis, portal gastropathy, recurrent GI bleed, blind, sacral decub ulcer, presented to the ED from nursing home with encephalopathy, hypoxic resp failure requiring bipap, in ED found to have anemia with Hb 5 that responded to blood transfusions (hx of recurrent GI bleed for which multiple EGDs/colonoscopies have been performed, no further invasive work up as per GI), sepsis secondary to PNA on abx, KEITH on CKD now requiring HD via left IJ. tunnelled cath palced for access on 1/3/20. PT failed s/s and is NPO. NGT feeding was started  and family to make decisions initialy re forther care PEG , now opting for PEG . Had upper ext mapping US showed left barchial veins DVT around the cathater and superficial right basilic and left cephalic vein thrombosis which vascular rec full naticoagulation if not contraindicated due to GI bleed on admission ;discussed with GI to hold on full dose anticoagulation for now , abx restarted for worsening pulmonary infiltrate and congestion , failed repeat swallow evaluation and MBS , s/p PEG insertion 1/10 peg feeding started , cont HD     1- Oropharyngeal dysphagia   failed MBS abd swallow evaluation  s/p PEG , tolerating feeding   advance as tolerate       2- Un stageable Sacral Decub  s/p debridement bedside x 2 by surgery   (family initially refused joss in the OR now reconsidering )  d/w surgery team last weekend , no debr planned at present   re call if needed to decide if needed prior discharge   cont daily wound care , repositioning     3- Right pneumonia - suspected  aspiration  treated initially abx restarted  for worsening infiltrate / CXR   short course treatment second course  completed   ID signed off   improving     4- bilateral superficial vein thrombosis on US doppler of upper ext and DVT on left brachial  vein around the cathater   vascular rec anticoagulation  due to GI bleed will hold off ;   d/w GI defer full dose anticoagulation now pending PEG and cont to monitor for drop in hb   poor candidate for full anticoagulation due to GI bleed and drop in hb   cont aspirin , heparin sq for DVT prophylaxis   repeat hb if stable may consider start full dose anticoagulation if cleared by GI       5-Acute on chronic diastolic CHF/ Acute hypoxic respiratory failure- improving   cont HD for fluid removal   iv lasix as needed given   No longer requiring bipap  on NC comfortable     6-Acute blood loss Anemia   Hx of recurrent GI bleed? prior EGDs/ colonoscopies this year which were negative, prior CTA with +transverse colon bleed  Seen by GI on this admission no further work up   Responded to blood transfusions   repeat blood tx given 2 days ago for drop in hb   cont Protonix daily     7- s/p Metabolic vs Uremic vs Hepatic Encephalopathy  Likely multifactorial - Hepatic, Uremic.   Uremia has resolved, neurologic work up is negative   stable     8-Known CASTAÑEDA Cirrhosis with portal gastropathy and AVMs  on Lactulose  and rifaximin  cont PPI   having loose bm will hold lactulose   check ammonia level in am     9-KEITH/ATN on CKD- started on new HD   cont HD per nephrology   need dailysis seat in the community   l  10-DM2- cont sliding scale insulin coverage   Well controlled  Cont sliding scale insulin coverage       eventual discharge  to [facility] /[facility]

## 2020-01-12 NOTE — PROGRESS NOTE ADULT - SUBJECTIVE AND OBJECTIVE BOX
CC : lower back pain   seen examined , lying in the bed   she is c/o pain in the lower back side     Vital Signs Last 24 Hrs  T(C): 36.6 (12 Jan 2020 08:00), Max: 37 (11 Jan 2020 22:45)  T(F): 97.8 (12 Jan 2020 08:00), Max: 98.6 (11 Jan 2020 22:45)  HR: 83 (12 Jan 2020 09:07) (83 - 91)  BP: 88/51 (12 Jan 2020 08:00) (88/51 - 127/64)  BP(mean): --  RR: 18 (11 Jan 2020 22:45) (18 - 18)  SpO2: 96% (12 Jan 2020 09:07) (96% - 99%)      GENERAL: no distress , lying  in the bed   CHEST/LUNG: bilateral rhonchi , fair air entry   HEART: Regular rate and rhythm; S1/s2 No murmurs  ABDOMEN: Soft, Nontender, Nondistended; Bowel sounds present  EXTREMITIES:  No clubbing, bilateral  heel protector in place                                                 9.2    7.56  )-----------( 271      ( 11 Jan 2020 06:45 )             27.4   01-11    138  |  100  |  30.0<H>  ----------------------------<  111  4.6   |  24.0  |  3.01<H>    Ca    7.8<L>      11 Jan 2020 06:45               IMPRESSION:  1.  Nonocclusive deep vein thrombosis in one of the duplicated left brachial veins surrounding a catheter.  2.  No evidence of deep vein thrombosis in the right upper extremity.  3.  Superficial venous thrombosis involving the right basilic vein in the upper arm and the left cephalic vein in the upper arm.  4.  Cephalic and basilic vein measurements as described.       < end of copied text >                       CAPILLARY BLOOD GLUCOSE      POCT Blood Glucose.: 154 mg/dL (12 Jan 2020 11:28)  POCT Blood Glucose.: 176 mg/dL (12 Jan 2020 05:49)  POCT Blood Glucose.: 153 mg/dL (12 Jan 2020 00:47)  POCT Blood Glucose.: 102 mg/dL (11 Jan 2020 17:59)

## 2020-01-12 NOTE — PROGRESS NOTE ADULT - PROBLEM SELECTOR PLAN 1
doing well post PEG and tolerating feeds. Will advance to 60ml/hr and will see only prn your request. Suggest suture removal in about two weeks by surgery.

## 2020-01-13 LAB
AMMONIA BLD-MCNC: 45 UMOL/L — SIGNIFICANT CHANGE UP (ref 11–55)
APPEARANCE UR: ABNORMAL
BACTERIA # UR AUTO: ABNORMAL
BILIRUB UR-MCNC: ABNORMAL
COLOR SPEC: ABNORMAL
COMMENT - URINE: SIGNIFICANT CHANGE UP
DIFF PNL FLD: ABNORMAL
EPI CELLS # UR: SIGNIFICANT CHANGE UP
GLUCOSE BLDC GLUCOMTR-MCNC: 146 MG/DL — HIGH (ref 70–99)
GLUCOSE BLDC GLUCOMTR-MCNC: 192 MG/DL — HIGH (ref 70–99)
GLUCOSE BLDC GLUCOMTR-MCNC: 233 MG/DL — HIGH (ref 70–99)
GLUCOSE BLDC GLUCOMTR-MCNC: 262 MG/DL — HIGH (ref 70–99)
GLUCOSE UR QL: NEGATIVE MG/DL — SIGNIFICANT CHANGE UP
HCT VFR BLD CALC: 29.5 % — LOW (ref 34.5–45)
HGB BLD-MCNC: 9.7 G/DL — LOW (ref 11.5–15.5)
KETONES UR-MCNC: ABNORMAL
LEUKOCYTE ESTERASE UR-ACNC: ABNORMAL
MCHC RBC-ENTMCNC: 29.7 PG — SIGNIFICANT CHANGE UP (ref 27–34)
MCHC RBC-ENTMCNC: 32.9 GM/DL — SIGNIFICANT CHANGE UP (ref 32–36)
MCV RBC AUTO: 90.2 FL — SIGNIFICANT CHANGE UP (ref 80–100)
NITRITE UR-MCNC: NEGATIVE — SIGNIFICANT CHANGE UP
PH UR: 6.5 — SIGNIFICANT CHANGE UP (ref 5–8)
PLATELET # BLD AUTO: 281 K/UL — SIGNIFICANT CHANGE UP (ref 150–400)
PROT UR-MCNC: 100 MG/DL
RBC # BLD: 3.27 M/UL — LOW (ref 3.8–5.2)
RBC # FLD: 20.6 % — HIGH (ref 10.3–14.5)
RBC CASTS # UR COMP ASSIST: SIGNIFICANT CHANGE UP /HPF (ref 0–4)
SP GR SPEC: 1.02 — SIGNIFICANT CHANGE UP (ref 1.01–1.02)
UROBILINOGEN FLD QL: NEGATIVE MG/DL — SIGNIFICANT CHANGE UP
WBC # BLD: 8.53 K/UL — SIGNIFICANT CHANGE UP (ref 3.8–10.5)
WBC # FLD AUTO: 8.53 K/UL — SIGNIFICANT CHANGE UP (ref 3.8–10.5)
WBC UR QL: ABNORMAL

## 2020-01-13 PROCEDURE — 99232 SBSQ HOSP IP/OBS MODERATE 35: CPT

## 2020-01-13 PROCEDURE — 99233 SBSQ HOSP IP/OBS HIGH 50: CPT

## 2020-01-13 RX ORDER — LACTULOSE 10 G/15ML
10 SOLUTION ORAL
Refills: 0 | Status: DISCONTINUED | OUTPATIENT
Start: 2020-01-13 | End: 2020-01-23

## 2020-01-13 RX ADMIN — Medication 3 MILLILITER(S): at 20:22

## 2020-01-13 RX ADMIN — Medication 2: at 05:59

## 2020-01-13 RX ADMIN — Medication 1 APPLICATION(S): at 05:37

## 2020-01-13 RX ADMIN — PANTOPRAZOLE SODIUM 40 MILLIGRAM(S): 20 TABLET, DELAYED RELEASE ORAL at 05:37

## 2020-01-13 RX ADMIN — Medication 3 MILLILITER(S): at 03:37

## 2020-01-13 RX ADMIN — MORPHINE SULFATE 1 MILLIGRAM(S): 50 CAPSULE, EXTENDED RELEASE ORAL at 09:08

## 2020-01-13 RX ADMIN — MORPHINE SULFATE 1 MILLIGRAM(S): 50 CAPSULE, EXTENDED RELEASE ORAL at 17:16

## 2020-01-13 RX ADMIN — Medication 4: at 17:09

## 2020-01-13 RX ADMIN — HEPARIN SODIUM 5000 UNIT(S): 5000 INJECTION INTRAVENOUS; SUBCUTANEOUS at 05:37

## 2020-01-13 RX ADMIN — HEPARIN SODIUM 5000 UNIT(S): 5000 INJECTION INTRAVENOUS; SUBCUTANEOUS at 21:58

## 2020-01-13 RX ADMIN — Medication 3 MILLILITER(S): at 15:32

## 2020-01-13 RX ADMIN — MORPHINE SULFATE 1 MILLIGRAM(S): 50 CAPSULE, EXTENDED RELEASE ORAL at 10:32

## 2020-01-13 RX ADMIN — Medication 3 MILLILITER(S): at 09:15

## 2020-01-13 RX ADMIN — HEPARIN SODIUM 5000 UNIT(S): 5000 INJECTION INTRAVENOUS; SUBCUTANEOUS at 12:08

## 2020-01-13 RX ADMIN — Medication 6: at 12:10

## 2020-01-13 RX ADMIN — CHLORHEXIDINE GLUCONATE 1 APPLICATION(S): 213 SOLUTION TOPICAL at 05:37

## 2020-01-13 RX ADMIN — Medication 81 MILLIGRAM(S): at 11:21

## 2020-01-13 RX ADMIN — SODIUM CHLORIDE 20 MILLILITER(S): 9 INJECTION INTRAMUSCULAR; INTRAVENOUS; SUBCUTANEOUS at 11:32

## 2020-01-13 RX ADMIN — PANTOPRAZOLE SODIUM 40 MILLIGRAM(S): 20 TABLET, DELAYED RELEASE ORAL at 17:12

## 2020-01-13 NOTE — PROGRESS NOTE ADULT - ASSESSMENT
63 yo female with hx of CHFpEF, CAD, CKD, HTN, DM, cirrhosis, portal gastropathy, recurrent GI bleed, blind, sacral decub ulcer, presented to the ED from nursing home with encephalopathy, hypoxic resp failure requiring bipap, in ED found to have anemia with Hb 5 that responded to blood transfusions (hx of recurrent GI bleed for which multiple EGDs/colonoscopies have been performed, no further invasive work up as per GI). Also noted to have sepsis on admission that was secondary to PNA, possibly gram negative bacteria. Patient completed a course of zosyn. Also with KEITH on CKD now requiring HD via left IJ. Tunnelled cath placed for access on 1/3/20. Patient failed s/s and is NPO. NGT feeding was started and family to make now opting for PEG. Had upper ext mapping US showed left brachial veins DVT around the cathater and superficial right basilic and left cephalic vein thrombosis which vascular recommends full anticoagulation if not contraindicated due to GI bleed on admission; discussed with GI to hold on full dose anticoagulation for now.    #Oropharyngeal dysphagia   - failed MBS abd swallow evaluation  - s/p PEG , tolerating feeding   - advance as tolerate     #Unstageable Sacral Decub  - s/p debridement bedside x 2 by surgery   - no debridement planned at present   - cont daily wound care repositioning     #Right pneumonia - suspected  aspiration  - treated initially abx restarted  for worsening infiltrate / CXR   - short course treatment course completed     #bilateral superficial vein thrombosis on US doppler of upper ext and DVT on left brachial  vein around the cathater   - vascular rec anticoagulation  due to GI bleed will hold off ;   - d/w GI defer full dose anticoagulation now pending PEG and cont to monitor for drop in hb   - poor candidate for full anticoagulation due to GI bleed and drop in hb   - cont aspirin , heparin sq for DVT prophylaxis   - repeat hb if stable may consider start full dose anticoagulation if cleared by GI     #Acute on chronic diastolic CHF/ Acute hypoxic respiratory failure- improving   - cont HD for fluid removal   - No longer requiring bipap  - on NC comfortable     #Acute blood loss Anemia   - Hx of recurrent GI bleed? prior EGDs/ colonoscopies this year which were negative, prior CTA with +transverse colon bleed  - Seen by GI on this admission no further work up   - Responded to blood transfusions   - repeat blood transfusion required for drop in hgb  - cont Protonix daily     #s/p Metabolic vs Uremic vs Hepatic Encephalopathy  - stable     #CASTAÑEDA Cirrhosis with portal gastropathy and AVMs  - on Lactulose  and rifaximin  - cont PPI   - having loose BM will hold lactulose   - ammonia level 45 this AM    #KEITH/ATN on CKD- started on new HD   - cont HD per nephrology   - need dialysis seat in the community    #DM2- cont sliding scale insulin coverage   - Well controlled  - Cont sliding scale insulin coverage     eventual discharge  to [facility] 65 yo female with hx of CHFpEF, CAD, CKD, HTN, DM, cirrhosis, portal gastropathy, recurrent GI bleed, blind, sacral decub ulcer, presented to the ED from nursing home with encephalopathy, hypoxic resp failure requiring bipap, in ED found to have anemia with Hb 5 that responded to blood transfusions (hx of recurrent GI bleed for which multiple EGDs/colonoscopies have been performed, no further invasive work up as per GI). Also noted to have sepsis on admission that was secondary to PNA, possibly gram negative bacteria. Patient completed a course of zosyn. Also with KEITH on CKD now requiring HD via left IJ. Tunnelled cath placed for access on 1/3/20. Patient failed s/s and is NPO. NGT feeding was started and family to make now opting for PEG. Had upper ext mapping US showed left brachial veins DVT around the cathater and superficial right basilic and left cephalic vein thrombosis which vascular recommends full anticoagulation if not contraindicated due to GI bleed on admission; discussed with GI to hold on full dose anticoagulation for now.    #left abdominal and flank pain; rebound tenderness noted; complains of back to front pain; denies dysuria  - check UA  - check renal US  - continue IV morphine as needed    #Oropharyngeal dysphagia   - failed MBS abd swallow evaluation  - s/p PEG , tolerating feeding   - advance as tolerate     #Unstageable Sacral Decub  - s/p debridement bedside x 2 by surgery   - no debridement planned at present   - cont daily wound care repositioning     #Right pneumonia - suspected  aspiration  - treated initially abx restarted  for worsening infiltrate / CXR   - short course treatment course completed     #bilateral superficial vein thrombosis on US doppler of upper ext and DVT on left brachial  vein around the cathater   - vascular rec anticoagulation  due to GI bleed will hold off ;   - d/w GI defer full dose anticoagulation now pending PEG and cont to monitor for drop in hb   - poor candidate for full anticoagulation due to GI bleed and drop in hb   - cont aspirin , heparin sq for DVT prophylaxis   - repeat hb if stable may consider start full dose anticoagulation if cleared by GI     #Acute on chronic diastolic CHF/ Acute hypoxic respiratory failure- improving   - cont HD for fluid removal   - No longer requiring bipap  - on NC comfortable     #Acute blood loss Anemia   - Hx of recurrent GI bleed? prior EGDs/ colonoscopies this year which were negative, prior CTA with +transverse colon bleed  - Seen by GI on this admission no further work up   - Responded to blood transfusions   - repeat blood transfusion required for drop in hgb  - cont Protonix daily     #s/p Metabolic vs Uremic vs Hepatic Encephalopathy  - stable     #CASTAÑEDA Cirrhosis with portal gastropathy and AVMs  - on Lactulose  and rifaximin  - cont PPI   - having loose BM will hold lactulose   - ammonia level 45 this AM    #KEITH/ATN on CKD- started on new HD   - cont HD per nephrology   - need dialysis seat in the community    #DM2- cont sliding scale insulin coverage   - Well controlled  - Cont sliding scale insulin coverage     eventual discharge  to [facility] /[facility] 65 yo female with hx of CHFpEF, CAD, CKD, HTN, DM, cirrhosis, portal gastropathy, recurrent GI bleed, blind, sacral decub ulcer, presented to the ED from nursing home with encephalopathy, hypoxic resp failure requiring bipap, in ED found to have anemia with Hb 5 that responded to blood transfusions (hx of recurrent GI bleed for which multiple EGDs/colonoscopies have been performed, no further invasive work up as per GI). Also noted to have sepsis on admission that was secondary to PNA, possibly gram negative bacteria. Patient completed a course of zosyn. Also with KEITH on CKD now requiring HD via left IJ. Tunnelled cath placed for access on 1/3/20. Patient failed s/s and is NPO. NGT feeding was started and family to make now opting for PEG. Had upper ext mapping US showed left brachial veins DVT around the cathater and superficial right basilic and left cephalic vein thrombosis which vascular recommends full anticoagulation if not contraindicated due to GI bleed on admission; discussed with GI to hold on full dose anticoagulation for now.    #left abdominal and flank pain; rebound tenderness noted; complains of back to front pain; denies dysuria  - check UA  - check renal US  - continue IV morphine as needed    #Oropharyngeal dysphagia   - failed MBS abd swallow evaluation  - s/p PEG , tolerating feeding   - at goal of 60cc/hour    #Unstageable Sacral Decub  - s/p debridement bedside x 2 by surgery   - no debridement planned at present   - cont daily wound care repositioning  - should place primafit     #Right pneumonia - suspected  aspiration  - treated initially abx restarted  for worsening infiltrate / CXR   - short course treatment course completed     #bilateral superficial vein thrombosis on US doppler of upper ext and DVT on left brachial  vein around the cathater   - vascular rec anticoagulation  due to GI bleed will hold off ;   - d/w GI defer full dose anticoagulation now pending PEG and cont to monitor for drop in hb   - poor candidate for full anticoagulation due to GI bleed and drop in hb   - cont aspirin , heparin sq for DVT prophylaxis   - repeat hb if stable may consider start full dose anticoagulation if cleared by GI     #Acute on chronic diastolic CHF/ Acute hypoxic respiratory failure- improving   - cont HD for fluid removal   - No longer requiring bipap  - on NC comfortable     #Acute blood loss Anemia   - Hx of recurrent GI bleed? prior EGDs/ colonoscopies this year which were negative, prior CTA with +transverse colon bleed  - Seen by GI on this admission no further work up   - Responded to blood transfusions   - repeat blood transfusion required for drop in hgb  - cont Protonix daily     #s/p Metabolic vs Uremic vs Hepatic Encephalopathy  - stable     #CASTAÑEDA Cirrhosis with portal gastropathy and AVMs  - change lactulose to standing through PEG with holding parameters  - c/w rifaximin  - cont PPI   - ammonia level 45 this AM    #KEITH/ATN on CKD- started on new HD   - cont HD per nephrology   - need dialysis seat in the community    #DM2- cont sliding scale insulin coverage   - Well controlled  - Cont sliding scale insulin coverage     eventual discharge  to [facility] /[facility]

## 2020-01-13 NOTE — PROGRESS NOTE ADULT - ASSESSMENT
This  63 y/o F with a h/o dCHF, moderate pHTN, CAD, CKD, HTN, DM, cirrhosis, portal gastropathy, recurrent GIB, blind, large sacral wound, presents to the ED from NH with AMS, hypoxemia, and hypotension. Patient had large melenotic BM in the ED. H&H: 5.8/17. Lactate: 2.7. KEITH on CKD. Hyperkalemic. Volume resuscitation began with 3u PRBC. Patient minimally responsive and moaning in pain. UA(+) with thick tan urine in fontenot. Developed progressive respiratory distress and hypoxemia requiring NIPPV. (26 Dec 2019 03:58)    Patient was managed by medical team for Encephalopathy, which was improving, thought to be multifactorial- Hepatic, Uremic. CT head negative, EEG without seizures   She also had  acute on chronic diastolic CHF/ Acute hypoxic respiratory failure.  For her acute blood loss Anemia , she responded to blood transfusion, no signs of active bleeding at this time.  She is on Protonix, octreotide- total 5 days through 12/30/19, abx for SBP prophylaxis.  She has known CASTAÑEDA Cirrhosis with portal gastropathy and AVMs, on Lactulose and rifaximin, followed by GI team.  For her KEITH/ATN on CKD, she has been getting intermittent dialysis in hospital.     During initial part of hospital stay, she had fever of T100 and is being treated for sepsis.  Her Ucx was negative and her Blood cx was also negative .  There was a right sided infiltrate on CXR, was on zosyn at nursing home being treated for the same.  Completed course of meropenem x 5 days, 1/1/2020.      Now called back on 1/6/2020 for SOB, possible aspiration PNA    Impression:  recent PNA at SNF, s/p treatment  SOB; likely fluid overload - resolved  - WBC not elevated on repeat labs,    infiltrates from fluid retention given that PRO-BNP is greater than 70,000 pg/ml.  - continue dialysis  - completed second course of antibiotics w/ Zosyn on 01/10/2020 at 23:59  - will watch thereafter    Regarding failed swallow evaluation  - s/p PEG 1/10/2020    Sacral decub  - continue wound care per medical team      no contraindications to discharge to community from ID standpoint

## 2020-01-13 NOTE — PROGRESS NOTE ADULT - ASSESSMENT
1) KEITH on CKD now likely ESRD  2) encephalopathy- improved  4) Acute blood loss anemia superimposed on anemia of CKD  5) Hypotension- BP now improved    HD TTS schedule; next HD tomorrow  Needs AVF, outpt HD set up  Continue QUENTIN , PRBC transfusion prn with HD  Monitor lytes, BP and UOP  Will follow

## 2020-01-13 NOTE — CHART NOTE - NSCHARTNOTEFT_GEN_A_CORE
This is an off service palliative care note  Patient is FULL CODE including CPR and Intubation in the event of cardio/pulmonary arrest  Patient's surrogates are her  Isabel and daughter Isabella.  During our initial encounter Isabella and Pito defer to Nini to speak to during times they cannot be reached.   Daughter Isabella makes the primary decisions but confides in her father and aunt for assistance.   Contact numbers are as follow: [phone] ( ISABELLA) and [phone] (NINI)  At this time will sign off, if patient's GOC change please feel free to reconsult.    Thank you for the opportunity to assist with the care of this patient.   [facility] [phone].

## 2020-01-13 NOTE — PROGRESS NOTE ADULT - SUBJECTIVE AND OBJECTIVE BOX
[facility] DIVISION OF KIDNEY DISEASES AND HYPERTENSION -- HEMODIALYSIS NOTE  --------------------------------------------------------------------------------  Chief Complaint: ESRD/Ongoing hemodialysis requirement    24 hour events/subjective:  Pt seen and examined; states she feels well    PAST HISTORY  --------------------------------------------------------------------------------  No significant changes to PMH, PSH, FHx, SHx, unless otherwise noted    ALLERGIES & MEDICATIONS  --------------------------------------------------------------------------------  Allergies    ACE inhibitors (Other (Mild))    Intolerances    oxycodone (Sedation/Somnol)    Standing Inpatient Medications  albuterol/ipratropium for Nebulization. 3 milliLiter(s) Nebulizer every 6 hours  aspirin  chewable 81 milliGRAM(s) Oral daily  chlorhexidine 2% Cloths 1 Application(s) Topical <User Schedule>  chlorhexidine 4% Liquid 1 Application(s) Topical <User Schedule>  collagenase Ointment 1 Application(s) Topical daily  dextrose 5%. 1000 milliLiter(s) IV Continuous <Continuous>  dextrose 50% Injectable 12.5 Gram(s) IV Push once  dextrose 50% Injectable 25 Gram(s) IV Push once  dextrose 50% Injectable 25 Gram(s) IV Push once  epoetin sara Injectable 17831 Unit(s) IV Push <User Schedule>  heparin  Injectable 5000 Unit(s) SubCutaneous every 8 hours  insulin lispro (HumaLOG) corrective regimen sliding scale   SubCutaneous every 6 hours  lactulose Syrup 10 Gram(s) Oral two times a day  pantoprazole  Injectable 40 milliGRAM(s) IV Push two times a day  sodium chloride 0.9%. 1000 milliLiter(s) IV Continuous <Continuous>    PRN Inpatient Medications  acetaminophen  Suppository .. 650 milliGRAM(s) Rectal every 6 hours PRN  dextrose 40% Gel 15 Gram(s) Oral once PRN  glucagon  Injectable 1 milliGRAM(s) IntraMuscular once PRN  morphine  - Injectable 1 milliGRAM(s) SubCutaneous every 4 hours PRN  sodium chloride 0.9% lock flush 10 milliLiter(s) IV Push every 1 hour PRN      REVIEW OF SYSTEMS  --------------------------------------------------------------------------------  Gen: No weight changes, fatigue, fevers/chills, weakness  Skin: No rashes  Head/Eyes/Ears/Mouth: No headache  Respiratory: No dyspnea, cough,  CV: No chest pain, orthopnea  GI: No abdominal pain, diarrhea, constipation, nausea, vomiting,  MSK: No joint pain  Neuro: No dizziness/lightheadedness, weakness  Heme: No bleeding  Psych: No significant nervousness, anxiety, stress, depression    All other systems were reviewed and are negative, except as noted.    VITALS/PHYSICAL EXAM  --------------------------------------------------------------------------------  T(C): 36.8 (01-13-20 @ 07:49), Max: 37.1 (01-12-20 @ 21:55)  HR: 77 (01-13-20 @ 09:17) (77 - 90)  BP: 98/66 (01-13-20 @ 07:49) (98/66 - 107/63)  RR: 18 (01-13-20 @ 07:49) (17 - 18)  SpO2: 96% (01-13-20 @ 09:17) (93% - 98%)  Wt(kg): --        01-12-20 @ 07:01  -  01-13-20 @ 07:00  --------------------------------------------------------  IN: 875 mL / OUT: 0 mL / NET: 875 mL      Physical Exam:  	Gen: NAD  	HEENT: supple neck  	Pulm: CTA B/L  	CV: RRR, S1S2; no rub  	Abd: +BS, soft, nontender  	UE: Warm, intact strength; no asterixis  	LE: Warm, no edema  	Neuro: No focal deficits  	Psych: Normal affect and mood  	Skin: Warm, without rashes  	Vascular access: RIJ tunneled HD catheter      LABS/STUDIES  --------------------------------------------------------------------------------              9.7    8.53  >-----------<  281      [01-13-20 @ 10:09]              29.5       Iron 25, TIBC 154, %sat 16      [01-02-20 @ 09:39]  Ferritin 668      [01-02-20 @ 09:39]  PTH -- (Ca 8.0)      [01-02-20 @ 17:06]   119  PTH -- (Ca 9.1)      [05-30-19 @ 18:09]   107  PTH -- (Ca 7.9)      [01-17-19 @ 16:26]   175  PTH -- (Ca 8.1)      [01-16-19 @ 16:55]   148  Vitamin D (25OH) 25.2      [01-02-20 @ 17:06]  HbA1c 5.8      [12-26-19 @ 09:04]  TSH 1.10      [05-17-19 @ 12:21]    HBsAb 18.0      [12-26-19 @ 22:01]  HBsAg Nonreact      [12-26-19 @ 22:01]  HBcAb Nonreact      [12-26-19 @ 22:01]  HCV 0.20, Nonreact      [12-26-19 @ 22:01]

## 2020-01-13 NOTE — PROGRESS NOTE ADULT - SUBJECTIVE AND OBJECTIVE BOX
[facility]  INFECTIOUS DISEASES AND INTERNAL MEDICINE at [place]  =======================================================  Matt Jackson MD  Diplomates American Board of Internal Medicine and Infectious Diseases  Telephone [phone]  Fax            [phone]  =======================================================    [id number]  KUN PERESPRCLARA   follow up for : lung infiltrate    no new issues  denies fevers    =======================================================  REVIEW OF SYSTEMS:  CONSTITUTIONAL:  No Fever or chills  HEENT:  No diplopia or blurred vision.  No earache, sore throat or runny nose.  CARDIOVASCULAR:  No pressure, squeezing, strangling, tightness, heaviness or aching about the chest, neck, axilla or epigastrium.  RESPIRATORY:  No cough, shortness of breath  GASTROINTESTINAL:  No nausea, vomiting or diarrhea.  GENITOURINARY:  No dysuria, frequency or urgency. No Blood in urine  MUSCULOSKELETAL:  no joint aches, no muscle pain  SKIN:  No change in skin, hair or nails.  NEUROLOGIC:  No Headaches, seizures or weakness.  PSYCHIATRIC:  No disorder of thought or mood.  ENDOCRINE:  No heat or cold intolerance  HEMATOLOGICAL:  No easy bruising or bleeding.     =======================================================  Allergies  ACE inhibitors (Other (Mild))  oxycodone (Sedation/Somnol)    Antibiotics:  - none -    Other medications:  albuterol/ipratropium for Nebulization. 3 milliLiter(s) Nebulizer every 6 hours  aspirin  chewable 81 milliGRAM(s) Oral daily  chlorhexidine 2% Cloths 1 Application(s) Topical <User Schedule>  chlorhexidine 4% Liquid 1 Application(s) Topical <User Schedule>  collagenase Ointment 1 Application(s) Topical daily  dextrose 5%. 1000 milliLiter(s) IV Continuous <Continuous>  dextrose 50% Injectable 12.5 Gram(s) IV Push once  dextrose 50% Injectable 25 Gram(s) IV Push once  dextrose 50% Injectable 25 Gram(s) IV Push once  epoetin sara Injectable 61709 Unit(s) IV Push <User Schedule>  heparin  Injectable 5000 Unit(s) SubCutaneous every 8 hours  insulin lispro (HumaLOG) corrective regimen sliding scale   SubCutaneous every 6 hours  lactulose Syrup 10 Gram(s) Oral two times a day  pantoprazole  Injectable 40 milliGRAM(s) IV Push two times a day  sodium chloride 0.9%. 1000 milliLiter(s) IV Continuous <Continuous>      ======================================================  Physical Exam:  ============  T(F): 98.3 (13 Jan 2020 07:49), Max: 98.7 (12 Jan 2020 21:55)  HR: 77 (13 Jan 2020 09:17)  BP: 98/66 (13 Jan 2020 07:49)  RR: 18 (13 Jan 2020 07:49)  SpO2: 96% (13 Jan 2020 09:17) (93% - 98%)  temp max in last 48H T(F): , Max: 98.7 (01-12-20 @ 21:55)    General:  No acute distress.  OBESE  Eye: Pupils are equal, round and reactive to light, Extraocular movements are intact, opacified right eye  HENT: Normocephalic,  Nasal cannula in place  Neck: Supple, No lymphadenopathy.  + RIGHT neck HD catheter  Respiratory: fair air entry anteriorly  Cardiovascular: Normal rate, Regular rhythm,   + ANASARCA  Gastrointestinal: Soft, Non-tender, Non-distended, Normal bowel sounds.  + PEG tube in LEFT lower abd, dressing in place  Genitourinary: No costovertebral angle tenderness.  Lymphatics: No lymphadenopathy neck,   Musculoskeletal: no joint abnl  Integumentary: No rash.  Neurologic: awake, tracks  Psychiatric: cooperative    =======================================================  Labs:                        9.7    8.53  )-----------( 281      ( 13 Jan 2020 10:09 )             29.5       WBC Count: 8.53 K/uL (01-13-20 @ 10:09)  WBC Count: 7.56 K/uL (01-11-20 @ 06:45)  WBC Count: 6.31 K/uL (01-10-20 @ 07:39)  WBC Count: 6.40 K/uL (01-09-20 @ 08:02)

## 2020-01-13 NOTE — PROGRESS NOTE ADULT - SUBJECTIVE AND OBJECTIVE BOX
CC: Acute hypoxemic respiratory failure, GIB (12 Jan 2020 19:06)    INTERVAL HPI/OVERNIGHT EVENTS:    Vital Signs Last 24 Hrs  T(C): 36.8 (13 Jan 2020 07:49), Max: 37.1 (12 Jan 2020 21:55)  T(F): 98.3 (13 Jan 2020 07:49), Max: 98.7 (12 Jan 2020 21:55)  HR: 77 (13 Jan 2020 09:17) (77 - 90)  BP: 98/66 (13 Jan 2020 07:49) (98/66 - 107/63)  BP(mean): --  RR: 18 (13 Jan 2020 07:49) (17 - 18)  SpO2: 96% (13 Jan 2020 09:17) (93% - 98%)    PHYSICAL EXAM:  General: Well developed; well nourished; in no acute distress  Eyes: PERRLA, EOMI; conjunctiva and sclera clear  Head: Normocephalic; atraumatic  ENMT: No nasal discharge; airway clear  Neck: Supple; non tender; no masses  Respiratory: No wheezes, rales or rhonchi  Cardiovascular: Regular rate and rhythm. S1 and S2 Normal; No murmurs, gallops or rubs  Gastrointestinal: Soft non-tender non-distended; Normal bowel sounds  Genitourinary: No costovertebral angle tenderness  Extremities: Normal range of motion, No clubbing, cyanosis or edema  Vascular: Peripheral pulses palpable 2+ bilaterally  Neurological: Alert and oriented x4  Skin: Warm and dry. No acute rash  Lymph Nodes: No acute cervical adenopathy  Musculoskeletal: Normal gait, tone, without deformities  Psychiatric: Cooperative and appropriate    I&O's Detail    12 Jan 2020 07:01  -  13 Jan 2020 07:00  --------------------------------------------------------  IN:    Enteral Tube Flush: 75 mL    Glucerna 1.5: 580 mL    sodium chloride 0.9%.: 220 mL  Total IN: 875 mL    OUT:  Total OUT: 0 mL    Total NET: 875 mL                       9.7    8.53  )-----------( 281      ( 13 Jan 2020 10:09 )             29.5     CAPILLARY BLOOD GLUCOSE  POCT Blood Glucose.: 192 mg/dL (13 Jan 2020 05:57)  POCT Blood Glucose.: 222 mg/dL (12 Jan 2020 22:32)  POCT Blood Glucose.: 243 mg/dL (12 Jan 2020 16:45)  POCT Blood Glucose.: 154 mg/dL (12 Jan 2020 11:28)    MEDICATIONS  (STANDING):  albuterol/ipratropium for Nebulization. 3 milliLiter(s) Nebulizer every 6 hours  aspirin  chewable 81 milliGRAM(s) Oral daily  chlorhexidine 2% Cloths 1 Application(s) Topical <User Schedule>  chlorhexidine 4% Liquid 1 Application(s) Topical <User Schedule>  collagenase Ointment 1 Application(s) Topical daily  dextrose 5%. 1000 milliLiter(s) (50 mL/Hr) IV Continuous <Continuous>  dextrose 50% Injectable 12.5 Gram(s) IV Push once  dextrose 50% Injectable 25 Gram(s) IV Push once  dextrose 50% Injectable 25 Gram(s) IV Push once  epoetin sara Injectable 17094 Unit(s) IV Push <User Schedule>  heparin  Injectable 5000 Unit(s) SubCutaneous every 8 hours  insulin lispro (HumaLOG) corrective regimen sliding scale   SubCutaneous every 6 hours  lactulose Retention Enema 200 Gram(s) Rectal two times a day  pantoprazole  Injectable 40 milliGRAM(s) IV Push two times a day  sodium chloride 0.9%. 1000 milliLiter(s) (20 mL/Hr) IV Continuous <Continuous>    MEDICATIONS  (PRN):  acetaminophen  Suppository .. 650 milliGRAM(s) Rectal every 6 hours PRN Temp greater or equal to 38C (100.4F), Mild Pain (1 - 3)  dextrose 40% Gel 15 Gram(s) Oral once PRN Blood Glucose LESS THAN 70 milliGRAM(s)/deciLiter  glucagon  Injectable 1 milliGRAM(s) IntraMuscular once PRN Glucose <70 milliGRAM(s)/deciLiter  morphine  - Injectable 1 milliGRAM(s) SubCutaneous every 4 hours PRN Moderate Pain (4 - 6)  sodium chloride 0.9% lock flush 10 milliLiter(s) IV Push every 1 hour PRN Pre/post blood products, medications, blood draw, and to maintain line patency    RADIOLOGY & ADDITIONAL TESTS: CC: Acute hypoxemic respiratory failure, GIB (12 Jan 2020 19:06)    INTERVAL HPI/OVERNIGHT EVENTS:  no acute events  per nursing multiple BM overnight  will change lactulose to PO with holding parameters  can DC IV fluids  patient with complaints of left abdominal and flank pain    Vital Signs Last 24 Hrs  T(C): 36.8 (13 Jan 2020 07:49), Max: 37.1 (12 Jan 2020 21:55)  T(F): 98.3 (13 Jan 2020 07:49), Max: 98.7 (12 Jan 2020 21:55)  HR: 77 (13 Jan 2020 09:17) (77 - 90)  BP: 98/66 (13 Jan 2020 07:49) (98/66 - 107/63)  BP(mean): --  RR: 18 (13 Jan 2020 07:49) (17 - 18)  SpO2: 96% (13 Jan 2020 09:17) (93% - 98%)    PHYSICAL EXAM:  General: in no acute distress  Eyes: opacified right eye, left eye pupil reactive and EOMI  Head: Normocephalic; atraumatic  ENMT: No nasal discharge; airway clear  Neck: obese  Respiratory: No wheezes, rales or rhonchi  Cardiovascular: Regular rate and rhythm. S1 and S2 Normal; loud systolic murmur  Gastrointestinal: Soft distended; tender in the L quadrants.  Genitourinary: Left back pain  Extremities: Normal range of motion, No clubbing, cyanosis or edema; deep wound on the right buttock adjacent to sacrum  Neurological: Alert and oriented to person and place  Skin: Warm and dry. No acute rash  Psychiatric: Cooperative and appropriate; flat affect    I&O's Detail    12 Jan 2020 07:01  -  13 Jan 2020 07:00  --------------------------------------------------------  IN:    Enteral Tube Flush: 75 mL    Glucerna 1.5: 580 mL    sodium chloride 0.9%.: 220 mL  Total IN: 875 mL    OUT:  Total OUT: 0 mL    Total NET: 875 mL                       9.7    8.53  )-----------( 281      ( 13 Jan 2020 10:09 )             29.5     CAPILLARY BLOOD GLUCOSE  POCT Blood Glucose.: 192 mg/dL (13 Jan 2020 05:57)  POCT Blood Glucose.: 222 mg/dL (12 Jan 2020 22:32)  POCT Blood Glucose.: 243 mg/dL (12 Jan 2020 16:45)  POCT Blood Glucose.: 154 mg/dL (12 Jan 2020 11:28)    MEDICATIONS  (STANDING):  albuterol/ipratropium for Nebulization. 3 milliLiter(s) Nebulizer every 6 hours  aspirin  chewable 81 milliGRAM(s) Oral daily  chlorhexidine 2% Cloths 1 Application(s) Topical <User Schedule>  chlorhexidine 4% Liquid 1 Application(s) Topical <User Schedule>  collagenase Ointment 1 Application(s) Topical daily  dextrose 5%. 1000 milliLiter(s) (50 mL/Hr) IV Continuous <Continuous>  dextrose 50% Injectable 12.5 Gram(s) IV Push once  dextrose 50% Injectable 25 Gram(s) IV Push once  dextrose 50% Injectable 25 Gram(s) IV Push once  epoetin sara Injectable 63379 Unit(s) IV Push <User Schedule>  heparin  Injectable 5000 Unit(s) SubCutaneous every 8 hours  insulin lispro (HumaLOG) corrective regimen sliding scale   SubCutaneous every 6 hours  lactulose Retention Enema 200 Gram(s) Rectal two times a day  pantoprazole  Injectable 40 milliGRAM(s) IV Push two times a day  sodium chloride 0.9%. 1000 milliLiter(s) (20 mL/Hr) IV Continuous <Continuous>    MEDICATIONS  (PRN):  acetaminophen  Suppository .. 650 milliGRAM(s) Rectal every 6 hours PRN Temp greater or equal to 38C (100.4F), Mild Pain (1 - 3)  dextrose 40% Gel 15 Gram(s) Oral once PRN Blood Glucose LESS THAN 70 milliGRAM(s)/deciLiter  glucagon  Injectable 1 milliGRAM(s) IntraMuscular once PRN Glucose <70 milliGRAM(s)/deciLiter  morphine  - Injectable 1 milliGRAM(s) SubCutaneous every 4 hours PRN Moderate Pain (4 - 6)  sodium chloride 0.9% lock flush 10 milliLiter(s) IV Push every 1 hour PRN Pre/post blood products, medications, blood draw, and to maintain line patency    RADIOLOGY & ADDITIONAL TESTS:

## 2020-01-14 LAB
ANION GAP SERPL CALC-SCNC: 13 MMOL/L — SIGNIFICANT CHANGE UP (ref 5–17)
BUN SERPL-MCNC: 40 MG/DL — HIGH (ref 8–20)
CALCIUM SERPL-MCNC: 7.8 MG/DL — LOW (ref 8.6–10.2)
CHLORIDE SERPL-SCNC: 99 MMOL/L — SIGNIFICANT CHANGE UP (ref 98–107)
CO2 SERPL-SCNC: 26 MMOL/L — SIGNIFICANT CHANGE UP (ref 22–29)
CREAT SERPL-MCNC: 3.5 MG/DL — HIGH (ref 0.5–1.3)
GLUCOSE BLDC GLUCOMTR-MCNC: 121 MG/DL — HIGH (ref 70–99)
GLUCOSE BLDC GLUCOMTR-MCNC: 212 MG/DL — HIGH (ref 70–99)
GLUCOSE BLDC GLUCOMTR-MCNC: 224 MG/DL — HIGH (ref 70–99)
GLUCOSE BLDC GLUCOMTR-MCNC: 250 MG/DL — HIGH (ref 70–99)
GLUCOSE BLDC GLUCOMTR-MCNC: 291 MG/DL — HIGH (ref 70–99)
GLUCOSE SERPL-MCNC: 260 MG/DL — HIGH (ref 70–115)
HCT VFR BLD CALC: 27.8 % — LOW (ref 34.5–45)
HGB BLD-MCNC: 9.2 G/DL — LOW (ref 11.5–15.5)
MCHC RBC-ENTMCNC: 30.2 PG — SIGNIFICANT CHANGE UP (ref 27–34)
MCHC RBC-ENTMCNC: 33.1 GM/DL — SIGNIFICANT CHANGE UP (ref 32–36)
MCV RBC AUTO: 91.1 FL — SIGNIFICANT CHANGE UP (ref 80–100)
PLATELET # BLD AUTO: 290 K/UL — SIGNIFICANT CHANGE UP (ref 150–400)
POTASSIUM SERPL-MCNC: 3.8 MMOL/L — SIGNIFICANT CHANGE UP (ref 3.5–5.3)
POTASSIUM SERPL-SCNC: 3.8 MMOL/L — SIGNIFICANT CHANGE UP (ref 3.5–5.3)
RBC # BLD: 3.05 M/UL — LOW (ref 3.8–5.2)
RBC # FLD: 20 % — HIGH (ref 10.3–14.5)
SODIUM SERPL-SCNC: 138 MMOL/L — SIGNIFICANT CHANGE UP (ref 135–145)
WBC # BLD: 8.19 K/UL — SIGNIFICANT CHANGE UP (ref 3.8–10.5)
WBC # FLD AUTO: 8.19 K/UL — SIGNIFICANT CHANGE UP (ref 3.8–10.5)

## 2020-01-14 PROCEDURE — 99232 SBSQ HOSP IP/OBS MODERATE 35: CPT

## 2020-01-14 PROCEDURE — 90937 HEMODIALYSIS REPEATED EVAL: CPT

## 2020-01-14 PROCEDURE — 74176 CT ABD & PELVIS W/O CONTRAST: CPT | Mod: 26

## 2020-01-14 RX ORDER — MORPHINE SULFATE 50 MG/1
2 CAPSULE, EXTENDED RELEASE ORAL EVERY 4 HOURS
Refills: 0 | Status: DISCONTINUED | OUTPATIENT
Start: 2020-01-14 | End: 2020-01-21

## 2020-01-14 RX ADMIN — PANTOPRAZOLE SODIUM 40 MILLIGRAM(S): 20 TABLET, DELAYED RELEASE ORAL at 05:55

## 2020-01-14 RX ADMIN — LACTULOSE 10 GRAM(S): 10 SOLUTION ORAL at 22:37

## 2020-01-14 RX ADMIN — Medication 3 MILLILITER(S): at 03:41

## 2020-01-14 RX ADMIN — MORPHINE SULFATE 1 MILLIGRAM(S): 50 CAPSULE, EXTENDED RELEASE ORAL at 01:32

## 2020-01-14 RX ADMIN — Medication 6: at 05:56

## 2020-01-14 RX ADMIN — MORPHINE SULFATE 1 MILLIGRAM(S): 50 CAPSULE, EXTENDED RELEASE ORAL at 10:45

## 2020-01-14 RX ADMIN — ERYTHROPOIETIN 10000 UNIT(S): 10000 INJECTION, SOLUTION INTRAVENOUS; SUBCUTANEOUS at 16:16

## 2020-01-14 RX ADMIN — MORPHINE SULFATE 1 MILLIGRAM(S): 50 CAPSULE, EXTENDED RELEASE ORAL at 00:40

## 2020-01-14 RX ADMIN — CHLORHEXIDINE GLUCONATE 1 APPLICATION(S): 213 SOLUTION TOPICAL at 05:56

## 2020-01-14 RX ADMIN — PANTOPRAZOLE SODIUM 40 MILLIGRAM(S): 20 TABLET, DELAYED RELEASE ORAL at 22:38

## 2020-01-14 RX ADMIN — HEPARIN SODIUM 5000 UNIT(S): 5000 INJECTION INTRAVENOUS; SUBCUTANEOUS at 22:37

## 2020-01-14 RX ADMIN — Medication 4: at 13:20

## 2020-01-14 RX ADMIN — HEPARIN SODIUM 5000 UNIT(S): 5000 INJECTION INTRAVENOUS; SUBCUTANEOUS at 05:55

## 2020-01-14 RX ADMIN — CHLORHEXIDINE GLUCONATE 1 APPLICATION(S): 213 SOLUTION TOPICAL at 05:55

## 2020-01-14 RX ADMIN — Medication 1 APPLICATION(S): at 05:56

## 2020-01-14 RX ADMIN — MORPHINE SULFATE 1 MILLIGRAM(S): 50 CAPSULE, EXTENDED RELEASE ORAL at 18:39

## 2020-01-14 RX ADMIN — MORPHINE SULFATE 1 MILLIGRAM(S): 50 CAPSULE, EXTENDED RELEASE ORAL at 11:45

## 2020-01-14 NOTE — PROGRESS NOTE ADULT - ASSESSMENT
Patient was seen and evaluated on dialysis.   Patient is tolerating the procedure well.     T(C): 36.7 (01-14-20 @ 08:01), Max: 36.9 (01-14-20 @ 05:04)  HR: 76 (01-14-20 @ 08:01) (66 - 96)  BP: 169/95 (01-14-20 @ 08:01) (112/85 - 169/95)    Continue dialysis:   Dialyzer:  Revaclear 300        QB: 400 ml/min       QD: 500ml/min  Goal UF 1.5-2.0 L over 210 mins Patient was seen and evaluated on dialysis.   Patient is tolerating the procedure well.     T(C): 36.7 (01-14-20 @ 08:01), Max: 36.9 (01-14-20 @ 05:04)  HR: 76 (01-14-20 @ 08:01) (66 - 96)  BP: 169/95 (01-14-20 @ 08:01) (112/85 - 169/95)    Continue dialysis:   Dialyzer:  Revaclear 300        QB: 400 ml/min       QD: 500ml/min  Goal UF 1.5-2.0 L over 210 mins,    I was Physically Present for the key portions of the Evaluation & management ( E/M ) Service provided.    I agree with the above History  , Physical examination & Treatment Plans,    I have Reviewed , Modified or appended where appropriate,

## 2020-01-14 NOTE — PROGRESS NOTE ADULT - ASSESSMENT
65 yo female with hx of CHFpEF, CAD, CKD, HTN, DM, cirrhosis, portal gastropathy, recurrent GI bleed, blind, sacral decub ulcer, presented to the ED from nursing home with encephalopathy, hypoxic resp failure requiring bipap, in ED found to have anemia with Hb 5 that responded to blood transfusions (hx of recurrent GI bleed for which multiple EGDs/colonoscopies have been performed, no further invasive work up as per GI). Also noted to have sepsis on admission that was secondary to PNA, possibly gram negative bacteria. Patient completed a course of zosyn. Also with KEITH on CKD now requiring HD via left IJ. Tunnelled cath placed for access on 1/3/20. Patient failed s/s and is NPO. NGT feeding was started and family to make now opting for PEG. Had upper ext mapping US showed left brachial veins DVT around the cathater and superficial right basilic and left cephalic vein thrombosis which vascular recommends full anticoagulation if not contraindicated due to GI bleed on admission; discussed with GI to hold on full dose anticoagulation for now.    #left abdominal and flank pain; rebound tenderness noted; complains of back to front pain; denies dysuria  - check UA - lots of blood but may be traumatic  - check renal US - refused yesterday; agrees to CT today  - continue IV morphine as needed; added 2mg IV q4 for severe pain    #Oropharyngeal dysphagia   - failed MBS abd swallow evaluation  - s/p PEG , tolerating feeding   - at goal of 60cc/hour    #Unstageable Sacral Decub - no signs of infection at this time  - s/p debridement bedside x 2 by surgery  - no debridement planned at present - for now will continue with current dressing changes; not a candidate for wound vac at this time per surgery  - cont daily wound care repositioning  - should place primafit - discussed with nursing    #Right pneumonia - suspected  aspiration  - treated initially abx restarted  for worsening infiltrate / CXR  - short course treatment course completed    #bilateral superficial vein thrombosis on US doppler of upper ext and DVT on left brachial  vein around the catheter  - vascular rec anticoagulation due to GI bleed will hold off;  - poor candidate for full anticoagulation due to GI bleed on this admission and again in the past  - cont aspirin for now; will discuss with family    #Acute on chronic diastolic CHF/ Acute hypoxic respiratory failure- improving   - cont HD for fluid removal   - No longer requiring bipap  - on NC comfortable     #Acute blood loss Anemia  - Hx of recurrent GI bleed? prior EGDs/ colonoscopies this year which were negative, prior CTA with +transverse colon bleed (Jan of 2019)  - Seen by GI on this admission no further work up   - Responded to blood transfusions   - repeat blood transfusion required for drop in hgb  - cont Protonix daily     #s/p Metabolic vs Uremic vs Hepatic Encephalopathy  - stable    #CASTAÑEDA Cirrhosis with portal gastropathy and AVMs  - change lactulose to standing through PEG with holding parameters  - c/w rifaximin  - cont PPI  - ammonia level 45 this AM    #KEITH/ATN on CKD- started on new HD   - cont HD per nephrology   - need dialysis seat in the community    #DM2- cont sliding scale insulin coverage   - Well controlled  - Cont sliding scale insulin coverage     Eventual discharge  to [facility]

## 2020-01-14 NOTE — PROGRESS NOTE ADULT - SUBJECTIVE AND OBJECTIVE BOX
[facility] DIVISION OF KIDNEY DISEASES AND HYPERTENSION -- HEMODIALYSIS NOTE  --------------------------------------------------------------------------------  Chief Complaint: ESRD/Ongoing hemodialysis requirement    24 hour events/subjective:  Patient seen and examined on HD, tolerating HD well    PAST HISTORY  --------------------------------------------------------------------------------  No significant changes to PMH, PSH, FHx, SHx, unless otherwise noted    ALLERGIES & MEDICATIONS  --------------------------------------------------------------------------------  Allergies  ACE inhibitors (Other (Mild))    Intolerances  oxycodone (Sedation/Somnol)    Standing Inpatient Medications  albuterol/ipratropium for Nebulization. 3 milliLiter(s) Nebulizer every 6 hours  aspirin  chewable 81 milliGRAM(s) Oral daily  chlorhexidine 2% Cloths 1 Application(s) Topical <User Schedule>  chlorhexidine 4% Liquid 1 Application(s) Topical <User Schedule>  collagenase Ointment 1 Application(s) Topical daily  dextrose 5%. 1000 milliLiter(s) IV Continuous <Continuous>  dextrose 50% Injectable 12.5 Gram(s) IV Push once  dextrose 50% Injectable 25 Gram(s) IV Push once  dextrose 50% Injectable 25 Gram(s) IV Push once  epoetin sara Injectable 26769 Unit(s) IV Push <User Schedule>  heparin  Injectable 5000 Unit(s) SubCutaneous every 8 hours  insulin lispro (HumaLOG) corrective regimen sliding scale   SubCutaneous every 6 hours  lactulose Syrup 10 Gram(s) Oral two times a day  pantoprazole  Injectable 40 milliGRAM(s) IV Push two times a day    PRN Inpatient Medications  acetaminophen  Suppository .. 650 milliGRAM(s) Rectal every 6 hours PRN  dextrose 40% Gel 15 Gram(s) Oral once PRN  glucagon  Injectable 1 milliGRAM(s) IntraMuscular once PRN  morphine  - Injectable 1 milliGRAM(s) SubCutaneous every 4 hours PRN  sodium chloride 0.9% lock flush 10 milliLiter(s) IV Push every 1 hour PRN      REVIEW OF SYSTEMS  --------------------------------------------------------------------------------  See other progress note    VITALS/PHYSICAL EXAM  --------------------------------------------------------------------------------  T(C): 36.7 (01-14-20 @ 08:01), Max: 36.9 (01-14-20 @ 05:04)  HR: 76 (01-14-20 @ 08:01) (66 - 96)  BP: 169/95 (01-14-20 @ 08:01) (112/85 - 169/95)  RR: 18 (01-14-20 @ 08:01) (16 - 18)  SpO2: 94% (01-14-20 @ 08:01) (92% - 98%)      01-13-20 @ 07:01  -  01-14-20 @ 07:00  --------------------------------------------------------  IN: 875 mL / OUT: 200 mL / NET: 675 mL      Physical Exam:  See other progress note  Vascular access: RIJ tunneled HD catheter      LABS/STUDIES  --------------------------------------------------------------------------------              9.7    8.53  >-----------<  281      [01-13-20 @ 10:09]              29.5       Iron 25, TIBC 154, %sat 16      [01-02-20 @ 09:39]  Ferritin 668      [01-02-20 @ 09:39]  PTH -- (Ca 8.0)      [01-02-20 @ 17:06]   119  PTH -- (Ca 9.1)      [05-30-19 @ 18:09]   107  PTH -- (Ca 7.9)      [01-17-19 @ 16:26]   175  PTH -- (Ca 8.1)      [01-16-19 @ 16:55]   148  Vitamin D (25OH) 25.2      [01-02-20 @ 17:06]  HbA1c 5.8      [12-26-19 @ 09:04]  TSH 1.10      [05-17-19 @ 12:21]    HBsAb 18.0      [12-26-19 @ 22:01]  HBsAg Nonreact      [12-26-19 @ 22:01]  HBcAb Nonreact      [12-26-19 @ 22:01]  HCV 0.20, Nonreact      [12-26-19 @ 22:01]

## 2020-01-14 NOTE — PROGRESS NOTE ADULT - SUBJECTIVE AND OBJECTIVE BOX
CC: Acute hypoxemic respiratory failure, GIB (12 Jan 2020 19:06)    INTERVAL HPI/OVERNIGHT EVENTS:  no acute events  patient with continued left flank pain also with buttock pain  she cries out when someone not in the room  appears better with 1mg of morphine but 2mg q4 IV was added for severe pain if present  agrees to CT of abdomen without contrast - will obtain prior to dialysis  Pito () at bedside discussed plan with him  patient has an accepting facility, if no abnormality on the CT abdomen may be able to go after dialysis.    Vital Signs Last 24 Hrs  T(C): 36.7 (14 Jan 2020 08:01), Max: 36.9 (14 Jan 2020 05:04)  T(F): 98.1 (14 Jan 2020 08:01), Max: 98.5 (14 Jan 2020 05:04)  HR: 76 (14 Jan 2020 08:01) (66 - 96)  BP: 169/95 (14 Jan 2020 08:01) (112/85 - 169/95)  BP(mean): --  RR: 18 (14 Jan 2020 08:01) (16 - 18)  SpO2: 94% (14 Jan 2020 08:01) (92% - 98%)    PHYSICAL EXAM:  General: mild distress due to pain  Eyes: opacified right eye, left eye pupil reactive and EOMI  Neck: obese  Respiratory: No wheezes, rales or rhonchi  Cardiovascular: Regular rate and rhythm. S1 and S2 Normal; loud systolic murmur  Gastrointestinal: Soft distended; tender in the L quadrants.  Genitourinary: Left back CVA tenderness/flank pain  Extremities: Normal range of motion, No clubbing, cyanosis or edema; deep wound on the right buttock adjacent to sacrum atleast 2cm deep, pink tissue at the base with topical medications, no surrounding erythema or necrotic areas.  Neurological: Alert and oriented to person and place  Skin: Warm and dry. No acute rash  Psychiatric: Cooperative and appropriate; flat affect    I&O's Detail    12 Jan 2020 07:01  -  13 Jan 2020 07:00  --------------------------------------------------------  IN:    Enteral Tube Flush: 75 mL    Glucerna 1.5: 580 mL    sodium chloride 0.9%.: 220 mL  Total IN: 875 mL    OUT:  Total OUT: 0 mL    Total NET: 875 mL                                  9.7    8.53  )-----------( 281      ( 13 Jan 2020 10:09 )             29.5     CAPILLARY BLOOD GLUCOSE  POCT Blood Glucose.: 224 mg/dL (14 Jan 2020 08:47)  POCT Blood Glucose.: 291 mg/dL (14 Jan 2020 05:22)  POCT Blood Glucose.: 146 mg/dL (13 Jan 2020 23:13)  POCT Blood Glucose.: 233 mg/dL (13 Jan 2020 16:57)    MEDICATIONS  (STANDING):  albuterol/ipratropium for Nebulization. 3 milliLiter(s) Nebulizer every 6 hours  aspirin  chewable 81 milliGRAM(s) Oral daily  chlorhexidine 2% Cloths 1 Application(s) Topical <User Schedule>  chlorhexidine 4% Liquid 1 Application(s) Topical <User Schedule>  collagenase Ointment 1 Application(s) Topical daily  dextrose 5%. 1000 milliLiter(s) (50 mL/Hr) IV Continuous <Continuous>  dextrose 50% Injectable 12.5 Gram(s) IV Push once  dextrose 50% Injectable 25 Gram(s) IV Push once  dextrose 50% Injectable 25 Gram(s) IV Push once  epoetin sara Injectable 42837 Unit(s) IV Push <User Schedule>  heparin  Injectable 5000 Unit(s) SubCutaneous every 8 hours  insulin lispro (HumaLOG) corrective regimen sliding scale   SubCutaneous every 6 hours  lactulose Retention Enema 200 Gram(s) Rectal two times a day  pantoprazole  Injectable 40 milliGRAM(s) IV Push two times a day  sodium chloride 0.9%. 1000 milliLiter(s) (20 mL/Hr) IV Continuous <Continuous>    MEDICATIONS  (PRN):  acetaminophen  Suppository .. 650 milliGRAM(s) Rectal every 6 hours PRN Temp greater or equal to 38C (100.4F), Mild Pain (1 - 3)  dextrose 40% Gel 15 Gram(s) Oral once PRN Blood Glucose LESS THAN 70 milliGRAM(s)/deciLiter  glucagon  Injectable 1 milliGRAM(s) IntraMuscular once PRN Glucose <70 milliGRAM(s)/deciLiter  morphine  - Injectable 1 milliGRAM(s) SubCutaneous every 4 hours PRN Moderate Pain (4 - 6)  sodium chloride 0.9% lock flush 10 milliLiter(s) IV Push every 1 hour PRN Pre/post blood products, medications, blood draw, and to maintain line patency    RADIOLOGY & ADDITIONAL TESTS:

## 2020-01-14 NOTE — PROGRESS NOTE ADULT - ASSESSMENT
1) KEITH on CKD now likely ESRD  2) encephalopathy- improved  4) Acute blood loss anemia superimposed on anemia of CKD  5) Hypotension- BP now improved    HD TTS schedule; next HD tomorrow  Needs AVF, outpt HD set up  Continue QUENTIN , PRBC transfusion prn with HD  Monitor lytes, BP and UOP

## 2020-01-14 NOTE — PROGRESS NOTE ADULT - SUBJECTIVE AND OBJECTIVE BOX
[facility] DIVISION OF KIDNEY DISEASES AND HYPERTENSION -- FOLLOW UP NOTE  --------------------------------------------------------------------------------  Chief Complaint: ESRD/Ongoing hemodialysis requirement    24 hour events/subjective:        PAST HISTORY  --------------------------------------------------------------------------------  No significant changes to PMH, PSH, FHx, SHx, unless otherwise noted    ALLERGIES & MEDICATIONS  --------------------------------------------------------------------------------  Allergies  ACE inhibitors (Other (Mild))    Intolerances  oxycodone (Sedation/Somnol)    Standing Inpatient Medications  albuterol/ipratropium for Nebulization. 3 milliLiter(s) Nebulizer every 6 hours  aspirin  chewable 81 milliGRAM(s) Oral daily  chlorhexidine 2% Cloths 1 Application(s) Topical <User Schedule>  chlorhexidine 4% Liquid 1 Application(s) Topical <User Schedule>  collagenase Ointment 1 Application(s) Topical daily  dextrose 5%. 1000 milliLiter(s) IV Continuous <Continuous>  dextrose 50% Injectable 12.5 Gram(s) IV Push once  dextrose 50% Injectable 25 Gram(s) IV Push once  dextrose 50% Injectable 25 Gram(s) IV Push once  epoetin sara Injectable 33129 Unit(s) IV Push <User Schedule>  heparin  Injectable 5000 Unit(s) SubCutaneous every 8 hours  insulin lispro (HumaLOG) corrective regimen sliding scale   SubCutaneous every 6 hours  lactulose Syrup 10 Gram(s) Oral two times a day  pantoprazole  Injectable 40 milliGRAM(s) IV Push two times a day    PRN Inpatient Medications  acetaminophen  Suppository .. 650 milliGRAM(s) Rectal every 6 hours PRN  dextrose 40% Gel 15 Gram(s) Oral once PRN  glucagon  Injectable 1 milliGRAM(s) IntraMuscular once PRN  morphine  - Injectable 1 milliGRAM(s) SubCutaneous every 4 hours PRN  sodium chloride 0.9% lock flush 10 milliLiter(s) IV Push every 1 hour PRN      REVIEW OF SYSTEMS  --------------------------------------------------------------------------------  Gen: No weight changes, fatigue, fevers/chills, weakness  Skin: No rashes  Head/Eyes/Ears/Mouth: No headache; Normal hearing; Normal vision w/o blurriness  Respiratory: No dyspnea, cough, wheezing, hemoptysis  CV: No chest pain, PND, orthopnea  GI: No abdominal pain, diarrhea, constipation, nausea, vomiting, melena, hematochezia  : No increased frequency, dysuria, hematuria, nocturia  MSK: No joint pain/swelling; no back pain; no edema  Neuro: No dizziness/lightheadedness, weakness, seizures, numbness, tingling  Heme: No easy bruising or bleeding  Endo: No heat/cold intolerance  Psych: No significant nervousness, anxiety, stress, depression    All other systems were reviewed and are negative, except as noted.    VITALS/PHYSICAL EXAM  --------------------------------------------------------------------------------  T(C): 36.7 (01-14-20 @ 08:01), Max: 36.9 (01-14-20 @ 05:04)  HR: 76 (01-14-20 @ 08:01) (66 - 96)  BP: 169/95 (01-14-20 @ 08:01) (112/85 - 169/95)  RR: 18 (01-14-20 @ 08:01) (16 - 18)  SpO2: 94% (01-14-20 @ 08:01) (92% - 98%)      01-13-20 @ 07:01  -  01-14-20 @ 07:00  --------------------------------------------------------  IN: 875 mL / OUT: 200 mL / NET: 675 mL      Physical Exam:  	Gen: NAD, well-appearing  	HEENT: supple neck  	Pulm: CTA B/L  	CV: RRR, S1S2; no rub  	Abd: +BS, soft, nontender/nondistended  	: No suprapubic tenderness  	UE: Warm, no edema; no asterixis  	LE: Warm, no edema  	Neuro: No focal deficits  	Psych: Normal affect and mood  	Skin: Warm  	Vascular access: RIJ tunneled HD catheter    LABS/STUDIES  --------------------------------------------------------------------------------              9.7    8.53  >-----------<  281      [01-13-20 @ 10:09]              29.5       Creatinine Trend:  SCr 3.01 [01-11 @ 06:45]  SCr 2.24 [01-10 @ 07:39]  SCr 2.76 [01-09 @ 08:02]  SCr 3.06 [01-07 @ 11:45]  SCr 2.29 [01-03 @ 07:59]    Urinalysis - [01-13-20 @ 14:52]      Color Brown / Appearance Slightly Turbid / SG 1.020 / pH 6.5      Gluc Negative / Ketone Trace  / Bili Small / Urobili Negative       Blood Large / Protein 100 / Leuk Est Moderate / Nitrite Negative      RBC TNTC / WBC 26-50 / Hyaline  / Gran  / Sq Epi  / Non Sq Epi Occasional / Bacteria Moderate      Iron 25, TIBC 154, %sat 16      [01-02-20 @ 09:39]  Ferritin 668      [01-02-20 @ 09:39]  PTH -- (Ca 8.0)      [01-02-20 @ 17:06]   119  PTH -- (Ca 9.1)      [05-30-19 @ 18:09]   107  PTH -- (Ca 7.9)      [01-17-19 @ 16:26]   175  PTH -- (Ca 8.1)      [01-16-19 @ 16:55]   148  Vitamin D (25OH) 25.2      [01-02-20 @ 17:06]  HbA1c 5.8      [12-26-19 @ 09:04]  TSH 1.10      [05-17-19 @ 12:21]    HBsAb 18.0      [12-26-19 @ 22:01]  HBsAg Nonreact      [12-26-19 @ 22:01]  HBcAb Nonreact      [12-26-19 @ 22:01]  HCV 0.20, Nonreact      [12-26-19 @ 22:01]

## 2020-01-15 LAB
GLUCOSE BLDC GLUCOMTR-MCNC: 152 MG/DL — HIGH (ref 70–99)
GLUCOSE BLDC GLUCOMTR-MCNC: 182 MG/DL — HIGH (ref 70–99)
GLUCOSE BLDC GLUCOMTR-MCNC: 231 MG/DL — HIGH (ref 70–99)
GLUCOSE BLDC GLUCOMTR-MCNC: 243 MG/DL — HIGH (ref 70–99)
GLUCOSE BLDC GLUCOMTR-MCNC: 311 MG/DL — HIGH (ref 70–99)

## 2020-01-15 PROCEDURE — 99232 SBSQ HOSP IP/OBS MODERATE 35: CPT

## 2020-01-15 PROCEDURE — 99233 SBSQ HOSP IP/OBS HIGH 50: CPT

## 2020-01-15 RX ADMIN — HEPARIN SODIUM 5000 UNIT(S): 5000 INJECTION INTRAVENOUS; SUBCUTANEOUS at 15:15

## 2020-01-15 RX ADMIN — Medication 4: at 00:04

## 2020-01-15 RX ADMIN — HEPARIN SODIUM 5000 UNIT(S): 5000 INJECTION INTRAVENOUS; SUBCUTANEOUS at 05:39

## 2020-01-15 RX ADMIN — MORPHINE SULFATE 2 MILLIGRAM(S): 50 CAPSULE, EXTENDED RELEASE ORAL at 08:09

## 2020-01-15 RX ADMIN — Medication 3 MILLILITER(S): at 10:16

## 2020-01-15 RX ADMIN — Medication 2: at 18:35

## 2020-01-15 RX ADMIN — Medication 4: at 12:31

## 2020-01-15 RX ADMIN — LACTULOSE 10 GRAM(S): 10 SOLUTION ORAL at 05:40

## 2020-01-15 RX ADMIN — Medication 8: at 08:12

## 2020-01-15 RX ADMIN — HEPARIN SODIUM 5000 UNIT(S): 5000 INJECTION INTRAVENOUS; SUBCUTANEOUS at 22:29

## 2020-01-15 RX ADMIN — Medication 3 MILLILITER(S): at 15:40

## 2020-01-15 RX ADMIN — Medication 81 MILLIGRAM(S): at 12:31

## 2020-01-15 RX ADMIN — Medication 1 APPLICATION(S): at 05:39

## 2020-01-15 RX ADMIN — Medication 4: at 23:49

## 2020-01-15 RX ADMIN — PANTOPRAZOLE SODIUM 40 MILLIGRAM(S): 20 TABLET, DELAYED RELEASE ORAL at 05:38

## 2020-01-15 RX ADMIN — PANTOPRAZOLE SODIUM 40 MILLIGRAM(S): 20 TABLET, DELAYED RELEASE ORAL at 18:30

## 2020-01-15 RX ADMIN — CHLORHEXIDINE GLUCONATE 1 APPLICATION(S): 213 SOLUTION TOPICAL at 05:39

## 2020-01-15 NOTE — PROGRESS NOTE ADULT - SUBJECTIVE AND OBJECTIVE BOX
CC: Acute hypoxemic respiratory failure, GIB (12 Jan 2020 19:06)    INTERVAL HPI/OVERNIGHT EVENTS:  no acute events  patient with continued left flank pain also with buttock pain    PHYSICAL EXAM:  General: mild distress due to pain  Eyes: opacified right eye, left eye pupil reactive and EOMI  Neck: obese  Respiratory: No wheezes, rales or rhonchi  Cardiovascular: Regular rate and rhythm. S1 and S2 Normal; loud systolic murmur  Gastrointestinal: Soft distended; tender in the L quadrants.  Genitourinary: Left back CVA tenderness/flank pain  Extremities: Normal range of motion, No clubbing, cyanosis or edema; deep wound on the right buttock adjacent to sacrum atleast 2cm deep, pink tissue at the base with topical medications, no surrounding erythema or necrotic areas.  Neurological: Alert and oriented to person and place  Skin: Warm and dry. No acute rash  Psychiatric: Cooperative and appropriate; flat affect    Complete Blood Count (01.14.20 @ 16:22)    WBC Count: 8.19 K/uL    RBC Count: 3.05 M/uL    Hemoglobin: 9.2 g/dL    Hematocrit: 27.8 %    Mean Cell Volume: 91.1 fl    Mean Cell Hemoglobin: 30.2 pg    Mean Cell Hemoglobin Conc: 33.1 gm/dL    Red Cell Distrib Width: 20.0 %    Platelet Count - Automated: 290 K/uL    Basic Metabolic Panel (01.14.20 @ 16:22)    Sodium, Serum: 138 mmol/L    Potassium, Serum: 3.8 mmol/L    Chloride, Serum: 99 mmol/L    Carbon Dioxide, Serum: 26.0 mmol/L    Anion Gap, Serum: 13 mmol/L    Blood Urea Nitrogen, Serum: 40.0 mg/dL    Creatinine, Serum: 3.50 mg/dL    Glucose, Serum: 260 mg/dL    Calcium, Total Serum: 7.8 mg/dL    eGFR if Non : 13    MEDICATIONS  (STANDING):  albuterol/ipratropium for Nebulization. 3 milliLiter(s) Nebulizer every 6 hours  aspirin  chewable 81 milliGRAM(s) Oral daily  chlorhexidine 2% Cloths 1 Application(s) Topical <User Schedule>  chlorhexidine 4% Liquid 1 Application(s) Topical <User Schedule>  collagenase Ointment 1 Application(s) Topical daily  dextrose 5%. 1000 milliLiter(s) (50 mL/Hr) IV Continuous <Continuous>  dextrose 50% Injectable 12.5 Gram(s) IV Push once  dextrose 50% Injectable 25 Gram(s) IV Push once  dextrose 50% Injectable 25 Gram(s) IV Push once  epoetin sara Injectable 22327 Unit(s) IV Push <User Schedule>  heparin  Injectable 5000 Unit(s) SubCutaneous every 8 hours  insulin lispro (HumaLOG) corrective regimen sliding scale   SubCutaneous every 6 hours  lactulose Retention Enema 200 Gram(s) Rectal two times a day  pantoprazole  Injectable 40 milliGRAM(s) IV Push two times a day  sodium chloride 0.9%. 1000 milliLiter(s) (20 mL/Hr) IV Continuous <Continuous>    MEDICATIONS  (PRN):  acetaminophen  Suppository .. 650 milliGRAM(s) Rectal every 6 hours PRN Temp greater or equal to 38C (100.4F), Mild Pain (1 - 3)  dextrose 40% Gel 15 Gram(s) Oral once PRN Blood Glucose LESS THAN 70 milliGRAM(s)/deciLiter  glucagon  Injectable 1 milliGRAM(s) IntraMuscular once PRN Glucose <70 milliGRAM(s)/deciLiter  morphine  - Injectable 1 milliGRAM(s) SubCutaneous every 4 hours PRN Moderate Pain (4 - 6)  sodium chloride 0.9% lock flush 10 milliLiter(s) IV Push every 1 hour PRN Pre/post blood products, medications, blood draw, and to maintain line patency    RADIOLOGY & ADDITIONAL TESTS:

## 2020-01-15 NOTE — PROGRESS NOTE ADULT - SUBJECTIVE AND OBJECTIVE BOX
[facility] DIVISION OF KIDNEY DISEASES AND HYPERTENSION -- HEMODIALYSIS NOTE  --------------------------------------------------------------------------------  Chief Complaint: ESRD/Ongoing hemodialysis requirement    24 hour events/subjective:  s/p HD yesterday with 2.1 L UF  Pt seen and examined; no new complaint        PAST HISTORY  --------------------------------------------------------------------------------  No significant changes to PMH, PSH, FHx, SHx, unless otherwise noted    ALLERGIES & MEDICATIONS  --------------------------------------------------------------------------------  Allergies    ACE inhibitors (Other (Mild))    Intolerances    oxycodone (Sedation/Somnol)    Standing Inpatient Medications  albuterol/ipratropium for Nebulization. 3 milliLiter(s) Nebulizer every 6 hours  aspirin  chewable 81 milliGRAM(s) Oral daily  chlorhexidine 2% Cloths 1 Application(s) Topical <User Schedule>  chlorhexidine 4% Liquid 1 Application(s) Topical <User Schedule>  collagenase Ointment 1 Application(s) Topical daily  dextrose 5%. 1000 milliLiter(s) IV Continuous <Continuous>  dextrose 50% Injectable 12.5 Gram(s) IV Push once  dextrose 50% Injectable 25 Gram(s) IV Push once  dextrose 50% Injectable 25 Gram(s) IV Push once  epoetin sara Injectable 05047 Unit(s) IV Push <User Schedule>  heparin  Injectable 5000 Unit(s) SubCutaneous every 8 hours  insulin lispro (HumaLOG) corrective regimen sliding scale   SubCutaneous every 6 hours  lactulose Syrup 10 Gram(s) Oral two times a day  pantoprazole  Injectable 40 milliGRAM(s) IV Push two times a day    PRN Inpatient Medications  acetaminophen  Suppository .. 650 milliGRAM(s) Rectal every 6 hours PRN  dextrose 40% Gel 15 Gram(s) Oral once PRN  glucagon  Injectable 1 milliGRAM(s) IntraMuscular once PRN  morphine  - Injectable 2 milliGRAM(s) IV Push every 4 hours PRN  morphine  - Injectable 1 milliGRAM(s) SubCutaneous every 4 hours PRN  sodium chloride 0.9% lock flush 10 milliLiter(s) IV Push every 1 hour PRN      REVIEW OF SYSTEMS  --------------------------------------------------------------------------------  Gen: No weight changes, fatigue, fevers/chills, weakness  Skin: No rashes  Head/Eyes/Ears/Mouth: No headache  Respiratory: No dyspnea, cough,  CV: No chest pain, orthopnea  GI: No abdominal pain, diarrhea, constipation, nausea, vomiting,  MSK: No joint pain  Neuro: No dizziness/lightheadedness, weakness  Heme: No bleeding      All other systems were reviewed and are negative, except as noted.    VITALS/PHYSICAL EXAM  --------------------------------------------------------------------------------  T(C): 36.8 (01-15-20 @ 08:00), Max: 37.6 (01-14-20 @ 15:15)  HR: 94 (01-15-20 @ 08:00) (90 - 107)  BP: 86/56 (01-15-20 @ 08:00) (86/56 - 119/-)  RR: 19 (01-15-20 @ 08:00) (18 - 20)  SpO2: 99% (01-15-20 @ 00:00) (99% - 100%)  Wt(kg): --        01-14-20 @ 07:01  -  01-15-20 @ 07:00  --------------------------------------------------------  IN: 0 mL / OUT: 2100 mL / NET: -2100 mL      Physical Exam:  	Gen: NAD  	HEENT:  supple neck  	Pulm: CTA B/L  	CV: RRR, S1S2; no rub  	Abd: +BS, soft, nontender  	UE: Warm  	LE: Warm, no edema  	Neuro: No focal deficits  	Psych: Normal affect and mood  	Skin: Warm, without rashes  	Vascular access: IRMA tunneled HD catheter    LABS/STUDIES  --------------------------------------------------------------------------------              9.2    8.19  >-----------<  290      [01-14-20 @ 16:22]              27.8     138  |  99  |  40.0  ----------------------------<  260      [01-14-20 @ 16:22]  3.8   |  26.0  |  3.50        Ca     7.8     [01-14-20 @ 16:22]      Iron 25, TIBC 154, %sat 16      [01-02-20 @ 09:39]  Ferritin 668      [01-02-20 @ 09:39]  PTH -- (Ca 8.0)      [01-02-20 @ 17:06]   119  PTH -- (Ca 9.1)      [05-30-19 @ 18:09]   107  PTH -- (Ca 7.9)      [01-17-19 @ 16:26]   175  PTH -- (Ca 8.1)      [01-16-19 @ 16:55]   148  Vitamin D (25OH) 25.2      [01-02-20 @ 17:06]  HbA1c 5.8      [12-26-19 @ 09:04]  TSH 1.10      [05-17-19 @ 12:21]    HBsAb 18.0      [12-26-19 @ 22:01]  HBsAg Nonreact      [12-26-19 @ 22:01]  HBcAb Nonreact      [12-26-19 @ 22:01]  HCV 0.20, Nonreact      [12-26-19 @ 22:01]

## 2020-01-15 NOTE — PROGRESS NOTE ADULT - ASSESSMENT
1) KEITH on CKD- now ESRD  2) encephalopathy- improved  4) Acute blood loss anemia superimposed on anemia of CKD  5) Hypotension- BP now improved    HD TTS schedule; next HD tomorrow  Needs AVF, outpt HD set up  Continue QUENTIN , PRBC transfusion prn with HD  Monitor lytes, BP and UOP    Will follow

## 2020-01-15 NOTE — PROGRESS NOTE ADULT - ASSESSMENT
65 yo female with hx of CHFpEF, CAD, CKD, HTN, DM, cirrhosis, portal gastropathy, recurrent GI bleed, blind, sacral decub ulcer, presented to the ED from nursing home with encephalopathy, hypoxic resp failure requiring bipap, in ED found to have anemia with Hb 5 that responded to blood transfusions (hx of recurrent GI bleed for which multiple EGDs/colonoscopies have been performed, no further invasive work up as per GI). Also noted to have sepsis on admission that was secondary to PNA, possibly gram negative bacteria. Patient completed a course of zosyn. Also with KEITH on CKD now requiring HD via left IJ. Tunnelled cath placed for access on 1/3/20. Patient failed s/s and is NPO. NGT feeding was started and family to make now opting for PEG. Had upper ext mapping US showed left brachial veins DVT around the cathater and superficial right basilic and left cephalic vein thrombosis which vascular recommends full anticoagulation if not contraindicated due to GI bleed on admission; discussed with GI to hold on full dose anticoagulation for now.    #left abdominal and flank pain; rebound tenderness noted; complains of back to front pain; denies dysuria  - check UA - lots of blood but may be traumatic  - CT showed no obstruction but anasarca, ascites and pleural effusions  - continue IV morphine as needed; added 2mg IV q4 for severe pain    #Oropharyngeal dysphagia   - failed MBS abd swallow evaluation  - s/p PEG , tolerating feeding   - at goal of 60cc/hour    #Unstageable Sacral Decub - no signs of infection at this time  - s/p debridement bedside x 2 by surgery  - no debridement planned at present - for now will continue with current dressing changes; not a candidate for wound vac at this time per surgery  - cont daily wound care repositioning  - should place primafit - discussed with nursing    #Right pneumonia - suspected  aspiration  - treated initially abx restarted  for worsening infiltrate / CXR  - short course treatment course completed    #bilateral superficial vein thrombosis on US doppler of upper ext and DVT on left brachial  vein around the catheter  - vascular rec anticoagulation due to GI bleed will hold off;  - poor candidate for full anticoagulation due to GI bleed on this admission and again in the past  - cont aspirin for now; will discuss with family    #Acute on chronic diastolic CHF/ Acute hypoxic respiratory failure- improving   - cont HD for fluid removal   - No longer requiring bipap  - on NC comfortable     #Acute blood loss Anemia  - Hx of recurrent GI bleed? prior EGDs/ colonoscopies this year which were negative, prior CTA with +transverse colon bleed (Jan of 2019)  - Seen by GI on this admission no further work up   - Responded to blood transfusions   - repeat blood transfusion required for drop in hgb  - cont Protonix daily     #s/p Metabolic vs Uremic vs Hepatic Encephalopathy  - stable    #CASTAÑEDA Cirrhosis with portal gastropathy and AVMs  - change lactulose to standing through PEG with holding parameters  - c/w rifaximin  - cont PPI  - ammonia level 45 this AM    #KEITH/ATN on CKD- started on new HD   - cont HD per nephrology   - need dialysis seat in the community    #DM2- cont sliding scale insulin coverage   - Well controlled  - Cont sliding scale insulin coverage     Eventual discharge  to [facility]

## 2020-01-16 LAB
ALBUMIN SERPL ELPH-MCNC: 2.1 G/DL — LOW (ref 3.3–5.2)
ALP SERPL-CCNC: 209 U/L — HIGH (ref 40–120)
ALT FLD-CCNC: 18 U/L — SIGNIFICANT CHANGE UP
ANION GAP SERPL CALC-SCNC: 11 MMOL/L — SIGNIFICANT CHANGE UP (ref 5–17)
AST SERPL-CCNC: 32 U/L — HIGH
BASE EXCESS BLDA CALC-SCNC: 6 MMOL/L — HIGH (ref -3–3)
BILIRUB SERPL-MCNC: 0.6 MG/DL — SIGNIFICANT CHANGE UP (ref 0.4–2)
BLOOD GAS COMMENTS ARTERIAL: SIGNIFICANT CHANGE UP
BLOOD GAS COMMENTS ARTERIAL: SIGNIFICANT CHANGE UP
BUN SERPL-MCNC: 39 MG/DL — HIGH (ref 8–20)
CALCIUM SERPL-MCNC: 7.9 MG/DL — LOW (ref 8.6–10.2)
CHLORIDE SERPL-SCNC: 96 MMOL/L — LOW (ref 98–107)
CO2 SERPL-SCNC: 25 MMOL/L — SIGNIFICANT CHANGE UP (ref 22–29)
CREAT SERPL-MCNC: 3.14 MG/DL — HIGH (ref 0.5–1.3)
GAS PNL BLDA: SIGNIFICANT CHANGE UP
GLUCOSE BLDC GLUCOMTR-MCNC: 178 MG/DL — HIGH (ref 70–99)
GLUCOSE BLDC GLUCOMTR-MCNC: 233 MG/DL — HIGH (ref 70–99)
GLUCOSE BLDC GLUCOMTR-MCNC: 243 MG/DL — HIGH (ref 70–99)
GLUCOSE BLDC GLUCOMTR-MCNC: 362 MG/DL — HIGH (ref 70–99)
GLUCOSE SERPL-MCNC: 274 MG/DL — HIGH (ref 70–115)
HCO3 BLDA-SCNC: 30 MMOL/L — HIGH (ref 20–26)
HCT VFR BLD CALC: 28.4 % — LOW (ref 34.5–45)
HGB BLD-MCNC: 9.7 G/DL — LOW (ref 11.5–15.5)
HOROWITZ INDEX BLDA+IHG-RTO: SIGNIFICANT CHANGE UP
MCHC RBC-ENTMCNC: 29.5 PG — SIGNIFICANT CHANGE UP (ref 27–34)
MCHC RBC-ENTMCNC: 34.2 GM/DL — SIGNIFICANT CHANGE UP (ref 32–36)
MCV RBC AUTO: 86.3 FL — SIGNIFICANT CHANGE UP (ref 80–100)
PCO2 BLDA: 39 MMHG — SIGNIFICANT CHANGE UP (ref 35–45)
PH BLDA: 7.49 — HIGH (ref 7.35–7.45)
PLATELET # BLD AUTO: 313 K/UL — SIGNIFICANT CHANGE UP (ref 150–400)
PO2 BLDA: 200 MMHG — HIGH (ref 83–108)
POTASSIUM SERPL-MCNC: 3.5 MMOL/L — SIGNIFICANT CHANGE UP (ref 3.5–5.3)
POTASSIUM SERPL-SCNC: 3.5 MMOL/L — SIGNIFICANT CHANGE UP (ref 3.5–5.3)
PROT SERPL-MCNC: 7.5 G/DL — SIGNIFICANT CHANGE UP (ref 6.6–8.7)
RBC # BLD: 3.29 M/UL — LOW (ref 3.8–5.2)
RBC # FLD: 19.8 % — HIGH (ref 10.3–14.5)
SODIUM SERPL-SCNC: 132 MMOL/L — LOW (ref 135–145)
WBC # BLD: 12.78 K/UL — HIGH (ref 3.8–10.5)
WBC # FLD AUTO: 12.78 K/UL — HIGH (ref 3.8–10.5)

## 2020-01-16 PROCEDURE — 99233 SBSQ HOSP IP/OBS HIGH 50: CPT

## 2020-01-16 PROCEDURE — 90937 HEMODIALYSIS REPEATED EVAL: CPT

## 2020-01-16 PROCEDURE — 71045 X-RAY EXAM CHEST 1 VIEW: CPT | Mod: 26

## 2020-01-16 PROCEDURE — 93010 ELECTROCARDIOGRAM REPORT: CPT

## 2020-01-16 RX ORDER — FUROSEMIDE 40 MG
60 TABLET ORAL DAILY
Refills: 0 | Status: DISCONTINUED | OUTPATIENT
Start: 2020-01-16 | End: 2020-01-16

## 2020-01-16 RX ORDER — ALBUMIN HUMAN 25 %
50 VIAL (ML) INTRAVENOUS ONCE
Refills: 0 | Status: DISCONTINUED | OUTPATIENT
Start: 2020-01-16 | End: 2020-01-16

## 2020-01-16 RX ORDER — MIDODRINE HYDROCHLORIDE 2.5 MG/1
10 TABLET ORAL ONCE
Refills: 0 | Status: COMPLETED | OUTPATIENT
Start: 2020-01-16 | End: 2020-01-16

## 2020-01-16 RX ADMIN — Medication 4: at 06:45

## 2020-01-16 RX ADMIN — Medication 1 APPLICATION(S): at 06:37

## 2020-01-16 RX ADMIN — Medication 3 MILLILITER(S): at 11:19

## 2020-01-16 RX ADMIN — Medication 3 MILLILITER(S): at 15:31

## 2020-01-16 RX ADMIN — HEPARIN SODIUM 5000 UNIT(S): 5000 INJECTION INTRAVENOUS; SUBCUTANEOUS at 22:33

## 2020-01-16 RX ADMIN — Medication 10: at 18:02

## 2020-01-16 RX ADMIN — MORPHINE SULFATE 2 MILLIGRAM(S): 50 CAPSULE, EXTENDED RELEASE ORAL at 08:15

## 2020-01-16 RX ADMIN — MORPHINE SULFATE 2 MILLIGRAM(S): 50 CAPSULE, EXTENDED RELEASE ORAL at 16:11

## 2020-01-16 RX ADMIN — MORPHINE SULFATE 2 MILLIGRAM(S): 50 CAPSULE, EXTENDED RELEASE ORAL at 21:56

## 2020-01-16 RX ADMIN — CHLORHEXIDINE GLUCONATE 1 APPLICATION(S): 213 SOLUTION TOPICAL at 06:39

## 2020-01-16 RX ADMIN — MORPHINE SULFATE 2 MILLIGRAM(S): 50 CAPSULE, EXTENDED RELEASE ORAL at 16:21

## 2020-01-16 RX ADMIN — LACTULOSE 10 GRAM(S): 10 SOLUTION ORAL at 06:38

## 2020-01-16 RX ADMIN — Medication 3 MILLILITER(S): at 21:09

## 2020-01-16 RX ADMIN — MORPHINE SULFATE 2 MILLIGRAM(S): 50 CAPSULE, EXTENDED RELEASE ORAL at 08:30

## 2020-01-16 RX ADMIN — MORPHINE SULFATE 2 MILLIGRAM(S): 50 CAPSULE, EXTENDED RELEASE ORAL at 01:49

## 2020-01-16 RX ADMIN — MORPHINE SULFATE 2 MILLIGRAM(S): 50 CAPSULE, EXTENDED RELEASE ORAL at 02:00

## 2020-01-16 RX ADMIN — ERYTHROPOIETIN 10000 UNIT(S): 10000 INJECTION, SOLUTION INTRAVENOUS; SUBCUTANEOUS at 18:42

## 2020-01-16 RX ADMIN — PANTOPRAZOLE SODIUM 40 MILLIGRAM(S): 20 TABLET, DELAYED RELEASE ORAL at 17:46

## 2020-01-16 RX ADMIN — HEPARIN SODIUM 5000 UNIT(S): 5000 INJECTION INTRAVENOUS; SUBCUTANEOUS at 06:38

## 2020-01-16 RX ADMIN — PANTOPRAZOLE SODIUM 40 MILLIGRAM(S): 20 TABLET, DELAYED RELEASE ORAL at 06:38

## 2020-01-16 RX ADMIN — MORPHINE SULFATE 2 MILLIGRAM(S): 50 CAPSULE, EXTENDED RELEASE ORAL at 21:41

## 2020-01-16 NOTE — PROGRESS NOTE ADULT - ASSESSMENT
1) KEITH on CKD- now deemed ESRD  2) encephalopathy- improved  4) Acute blood loss anemia superimposed on anemia of CKD  5) Hypotension- BP now improved    HD TTS schedule; next HD tomorrow  Needs AVF, outpt HD set up  Continue QUENTIN , PRBC transfusion prn with HD  Monitor lytes, BP and UOP    Will follow 1) KEITH on CKD- now deemed ESRD  2) encephalopathy- improved  4) Acute blood loss anemia superimposed on anemia of CKD  5) Hypotension- BP now improved    HD TTS schedule; HD today  Needs AVF, outpt HD set up  Continue QUENTIN , PRBC transfusion prn with HD  Monitor lytes, BP and UOP    Will follow

## 2020-01-16 NOTE — PROGRESS NOTE ADULT - PROBLEM/PLAN-7
Diagnosis:   1. Malignant neoplasm of both ovaries (CMS/HCC)      Regimen: Avastin  Cycle/Day: C16D1    Dr. Youssef is supervising clinician today.    ECO - No physically strenuous activity, but ambulatory and able to carry out light or sedentary work.    Review and verified Advanced Directives: No advance directive documentation on file    Verified if patient has state DNR bracelet on: No; Full Code    Nursing Assessment:   A focused nursing assessment addressing the toxicity of chemotherapy was performed by Susanna Hernandez LPN. RN reviewed assessment with patient with no new reports noted.    Patient confirms that she has received a copy of Chemotherapy Consent and verbalizes understanding of treatment plan: Yes.     Pre-Treatment: - Treatment consent signed  - Patient has valid pre-authorization  - VS completed  - BSA double checked  - Premed orders, including hydration, are verified prior to administration  - Treatment parameters verified in patient protocol  - Chemotherapy doses independently doubled checked & verified by two practitioners  - Chemotherapy infusion pump settings are double checked & verified by two practitioners  - Patient is identified by first & last name, Date of birth that has been verified by two practitioners with the patient chairside.    Treatment: Refer to LDA and MAR for line assessment and medication administration  Refer to Toxicity Assessment doc flowsheet   Chemotherapy has not   Apperance and physical integrity of drugs meets standard of drug monograph  Rate set on infusion pump is in alignment with ordered rate  Blood return confirmed before, during and after treatment administered  Infusion pump used for non-vesicant drugs    Post Treatment: Treatment tolerated well; no adverse reaction    Does the Patient have a central line?  yes:   Device: Port  Central Line Site: Left and Chest  Central Line Site Appearance: clear and WDL  Implanted port accessed using a 20 gauge  non-coring needle primed with 0.9% sodium chloride. Good blood return obtained.  Blood obtained and sent to lab.  Site Care: Aseptic site care per protocol, Sterile gauze and tape dressing applied and Injection caps changed/applied  Comments: Patient tolerated well  Central line flushed with: 10 ml 0.9 normal saline and 100 un/ml- 500 units heparin  Central line removed: no  Allen Needle: Allen needle de-accessed    Transfusion: Not needed    Integrative Medicine: No    Oral Chemotherapy: Yes, Patient is taking medication as prescribed.    Education: No new instructions needed    Next appointment scheduled: 2/6/2020  Future Appointments   Date Time Provider Department Center   1/27/2020 10:15 AM Jeb Castillo MD [email or web address]   1/29/2020  9:40 AM Lalito Vela MD [email or web address]   2/6/2020  8:45 AM [email or web address] [address]   2/6/2020  9:00 AM Diego Ventura MD [facility]   2/11/2020 11:00 AM [id number]   Patient instructed to call the office with any questions or concerns.    Patient Discharged: patient discharged to home per self, ambulatory, to home       DISPLAY PLAN FREE TEXT

## 2020-01-16 NOTE — RAPID RESPONSE TEAM SUMMARY - NSSITUATIONBACKGROUNDRRT_GEN_ALL_CORE
65 yo female with hx of CHFpEF, CAD, CKD, HTN, DM, cirrhosis, portal gastropathy, recurrent GI bleed, blind, sacral decub ulcer, presented to the ED from nursing home with encephalopathy, hypoxic resp failure requiring bipap, admitted for anemia a2/2 GI bleed.     Rapid response called by dialysis nurse due to hypoxia to 50's.

## 2020-01-16 NOTE — CHART NOTE - NSCHARTNOTEFT_GEN_A_CORE
Rapid Response Follow up    Pt seen and examined at bedside.  Currently  being monitored via cardiac monitor.  Appears clinically improved.  Sleeping but easily arousable.  Answers questions appropriately.  No new complaints. O2 Sat 100% on 3L NC.    PHYSICAL EXAM:  General: Well developed, well nourished, NAD  HEENT: NCAT, PERRLA, EOMI B/L, moist mucous membranes   Neck: Supple, nontender, no mass  Neurology: A&Ox2  Respiratory: b/l decreased BS at bases, + crackles  CV: RRR, +S1/S2, no murmurs  Abdominal:obese Soft, NT, + Peg in place  Extremities: no edema,   Skin: warm, dry, normal color, no rash or abnormal lesions      Vital Signs Last 24 Hrs  T(C): 37 (16 Jan 2020 14:20), Max: 37.2 (15 Noah 2020 15:42)  T(F): 98.6 (16 Jan 2020 14:20), Max: 98.9 (15 Noah 2020 15:42)  HR: 90 (16 Jan 2020 14:20) (89 - 96)  BP: 112/57 (16 Jan 2020 14:20) (106/52 - 133/72)  BP(mean): --  RR: 18 (16 Jan 2020 14:20) (18 - 20)  SpO2: 98% (16 Jan 2020 14:20) (50% - 98%)                              9.7    12.78 )-----------( 313      ( 16 Jan 2020 12:41 )             28.4     01-16    132<L>  |  96<L>  |  39.0<H>  ----------------------------<  274<H>  3.5   |  25.0  |  3.14<H>    Ca    7.9<L>      16 Jan 2020 12:40    TPro  7.5  /  Alb  2.1<L>  /  TBili  0.6  /  DBili  x   /  AST  32<H>  /  ALT  18  /  AlkPhos  209<H>  01-16      CXR reviewed with Dr. Parish,         A/P  65 yo female with hx of CHFpEF, CAD, CKD, HTN, DM, cirrhosis, portal gastropathy, recurrent GI bleed, blind, sacral decub ulcer, presented to the ED from nursing home with encephalopathy, hypoxic resp failure requiring bipap, admitted for anemia a2/2 GI bleed s/p rapid for hypoxia currently clinically improved.   -continue to monitor via cardiac monitor and pulse Ox  -HD  -renal following

## 2020-01-16 NOTE — RAPID RESPONSE TEAM SUMMARY - NSOTHERINTERVENTIONSRRT_GEN_ALL_CORE
Laced on Non-rebreather mask with good response, then weaned off to nasal canula 5L, with O2 sat of 95%  ABG done, reviewed by attenidng  EKG ordered and revuiewed, NSR with PACs, no ST acute abnormalities  CXR ordered  Transfer to [address] [place] [address] for monitored HD  Discussed and debriefed with medical team (TAMANNA Holland, nurses and attending, Dr Guillory)  PA to follow up within 2 hrs

## 2020-01-16 NOTE — CHART NOTE - NSCHARTNOTEFT_GEN_A_CORE
Source: Patient [ ]  Family [ ]   other [x ]EMR    Current Diet: NPO with peg tube feeds        Enteral /Parenteral Nutrition: Nepro at 60cchr provides 1200cc, 2160kcal, 97gr pro    Current Weight: 1/4 178#, 1/2 187# (12/29) 198.6#, 1/7 148#, 1/9 167#, 1/11 165#, 1/14 163.9#  (12/28) 214.5#  (12/27) 209.8#  (12/26) 214.9#  -Question accuracy of weights, will continue to monitor. Weight fluctuations could be due to fluid fluctuations, pt receiving HD.           % Weight Change     Pertinent Medications: MEDICATIONS  (STANDING):  albuterol/ipratropium for Nebulization. 3 milliLiter(s) Nebulizer every 6 hours  aspirin  chewable 81 milliGRAM(s) Oral daily  chlorhexidine 2% Cloths 1 Application(s) Topical <User Schedule>  chlorhexidine 4% Liquid 1 Application(s) Topical <User Schedule>  collagenase Ointment 1 Application(s) Topical daily  dextrose 5%. 1000 milliLiter(s) (50 mL/Hr) IV Continuous <Continuous>  dextrose 50% Injectable 12.5 Gram(s) IV Push once  dextrose 50% Injectable 25 Gram(s) IV Push once  dextrose 50% Injectable 25 Gram(s) IV Push once  epoetin sara Injectable 32519 Unit(s) IV Push <User Schedule>  heparin  Injectable 5000 Unit(s) SubCutaneous every 8 hours  insulin lispro (HumaLOG) corrective regimen sliding scale   SubCutaneous every 6 hours  lactulose Syrup 10 Gram(s) Oral two times a day  pantoprazole  Injectable 40 milliGRAM(s) IV Push two times a day    MEDICATIONS  (PRN):  acetaminophen  Suppository .. 650 milliGRAM(s) Rectal every 6 hours PRN Temp greater or equal to 38C (100.4F), Mild Pain (1 - 3)  dextrose 40% Gel 15 Gram(s) Oral once PRN Blood Glucose LESS THAN 70 milliGRAM(s)/deciLiter  glucagon  Injectable 1 milliGRAM(s) IntraMuscular once PRN Glucose <70 milliGRAM(s)/deciLiter  morphine  - Injectable 2 milliGRAM(s) IV Push every 4 hours PRN Severe Pain (7 - 10)  morphine  - Injectable 1 milliGRAM(s) SubCutaneous every 4 hours PRN Moderate Pain (4 - 6)  sodium chloride 0.9% lock flush 10 milliLiter(s) IV Push every 1 hour PRN Pre/post blood products, medications, blood draw, and to maintain line patency    Pertinent Labs: CBC Full  -  ( 14 Jan 2020 16:22 )  WBC Count : 8.19 K/uL  RBC Count : 3.05 M/uL  Hemoglobin : 9.2 g/dL  Hematocrit : 27.8 %  Platelet Count - Automated : 290 K/uL  Mean Cell Volume : 91.1 fl  Mean Cell Hemoglobin : 30.2 pg  Mean Cell Hemoglobin Concentration : 33.1 gm/dL  Auto Neutrophil # : x  Auto Lymphocyte # : x  Auto Monocyte # : x  Auto Eosinophil # : x  Auto Basophil # : x  Auto Neutrophil % : x  Auto Lymphocyte % : x  Auto Monocyte % : x  Auto Eosinophil % : x  Auto Basophil % : x      01-14 Na138 mmol/L Glu 260 mg/dL<H> K+ 3.8 mmol/L Cr  3.50 mg/dL<H> BUN 40.0 mg/dL<H> Phos n/a   Alb n/a   PAB n/a           Skin: unstageable sacral    Nutrition focused physical exam conducted - found signs of malnutrition [ x]absent [ ]present    Subcutaneous fat loss: [ ] Orbital fat pads region, [ ]Buccal fat region, [ ]Triceps region,  [ ]Ribs region    Muscle wasting: [ ]Temples region, [ ]Clavicle region, [ ]Shoulder region, [ ]Scapula region, [ ]Interosseous region,  [ ]thigh region, [ ]Calf region    Estimated Needs:   [x ] no change since previous assessment  [ ] recalculated:     Current Nutrition Diagnosis: Pt remains at high nutrition risk and meets criteria for moderate (chronic) malnutrition related to inability to consume increased protein energy intake in setting of unstageable sacral wound, hypovolemic shock for possible GI bleed/blood loss anemia as evidenced by pt meeting <75% estimated energy intake > 1 month and moderate edema.   Pt failed speech and swallow eval and is NPO with s/p Peg tolerating Nepro feeds well at 60cc hr which is target goal rate. Plan is SINGH.     Recommendations:     1) Continue TF at 60cchr to provide 1200cc, 2160kcal, 97gr pro  2) Monitor weights and labs  3) RX: Nephrovite and vitamin C (500mg) daily        Monitoring and Evaluation:   [ ] PO intake [ x] Tolerance to diet prescription [X] Weights  [X] Follow up per protocol [X] Labs:

## 2020-01-16 NOTE — RAPID RESPONSE TEAM SUMMARY - NSADDTLFINDINGSRRT_GEN_ALL_CORE
S: Patient denies any complaints.    O:Upon arrival from RRT, patient was placed by Resp therapy and nurses on Non-rebreather mask at 100% O2, with good response.   Patient is A&O x2  Cardio: tachycardic, regular, + systolic GIII/VI murmur  Resp: B/L inspiratory and expiratory crackles   Abd: Obese, non tender. PEG tube noted.

## 2020-01-16 NOTE — PROGRESS NOTE ADULT - SUBJECTIVE AND OBJECTIVE BOX
[facility] DIVISION OF KIDNEY DISEASES AND HYPERTENSION -- HEMODIALYSIS NOTE  --------------------------------------------------------------------------------  Chief Complaint: ESRD/Ongoing hemodialysis requirement    24 hour events/subjective:        PAST HISTORY  --------------------------------------------------------------------------------  No significant changes to PMH, PSH, FHx, SHx, unless otherwise noted    ALLERGIES & MEDICATIONS  --------------------------------------------------------------------------------  Allergies    ACE inhibitors (Other (Mild))    Intolerances    oxycodone (Sedation/Somnol)    Standing Inpatient Medications  albuterol/ipratropium for Nebulization. 3 milliLiter(s) Nebulizer every 6 hours  aspirin  chewable 81 milliGRAM(s) Oral daily  chlorhexidine 2% Cloths 1 Application(s) Topical <User Schedule>  chlorhexidine 4% Liquid 1 Application(s) Topical <User Schedule>  collagenase Ointment 1 Application(s) Topical daily  dextrose 5%. 1000 milliLiter(s) IV Continuous <Continuous>  dextrose 50% Injectable 12.5 Gram(s) IV Push once  dextrose 50% Injectable 25 Gram(s) IV Push once  dextrose 50% Injectable 25 Gram(s) IV Push once  epoetin sara Injectable 62185 Unit(s) IV Push <User Schedule>  heparin  Injectable 5000 Unit(s) SubCutaneous every 8 hours  insulin lispro (HumaLOG) corrective regimen sliding scale   SubCutaneous every 6 hours  lactulose Syrup 10 Gram(s) Oral two times a day  pantoprazole  Injectable 40 milliGRAM(s) IV Push two times a day    PRN Inpatient Medications  acetaminophen  Suppository .. 650 milliGRAM(s) Rectal every 6 hours PRN  dextrose 40% Gel 15 Gram(s) Oral once PRN  glucagon  Injectable 1 milliGRAM(s) IntraMuscular once PRN  morphine  - Injectable 2 milliGRAM(s) IV Push every 4 hours PRN  morphine  - Injectable 1 milliGRAM(s) SubCutaneous every 4 hours PRN  sodium chloride 0.9% lock flush 10 milliLiter(s) IV Push every 1 hour PRN      REVIEW OF SYSTEMS  --------------------------------------------------------------------------------  Gen: No weight changes, fatigue, fevers/chills, weakness  Skin: No rashes  Head/Eyes/Ears/Mouth: No headache  Respiratory: No dyspnea, cough,  CV: No chest pain, orthopnea  GI: No abdominal pain, diarrhea, constipation, nausea, vomiting,  MSK: No joint pain  Neuro: No dizziness/lightheadedness, weakness  Heme: No bleeding  Psych: No significant nervousness, anxiety, stress, depression    All other systems were reviewed and are negative, except as noted.    VITALS/PHYSICAL EXAM  --------------------------------------------------------------------------------  T(C): 36.9 (01-16-20 @ 08:10), Max: 37.2 (01-15-20 @ 15:42)  HR: 96 (01-16-20 @ 08:10) (89 - 96)  BP: 133/72 (01-16-20 @ 08:10) (114/68 - 133/72)  RR: 19 (01-16-20 @ 08:10) (18 - 19)  SpO2: 96% (01-16-20 @ 11:01) (91% - 98%)  Wt(kg): --        Physical Exam:  	Gen: NAD, well-appearing  	HEENT:  supple neck  	Pulm: CTA B/L  	CV: RRR, S1S2; no rub  	Abd: +BS, soft, nontender  	UE: Warm, intact strength; no asterixis  	LE: Warm, + edema  	Neuro: No focal deficits  	Psych: Normal affect and mood  	Skin: Warm, without rashes  	Vascular access: RIJ non tunneled HD catheter     LABS/STUDIES  --------------------------------------------------------------------------------              9.2    8.19  >-----------<  290      [01-14-20 @ 16:22]              27.8     138  |  99  |  40.0  ----------------------------<  260      [01-14-20 @ 16:22]  3.8   |  26.0  |  3.50        Ca     7.8     [01-14-20 @ 16:22]    Iron 25, TIBC 154, %sat 16      [01-02-20 @ 09:39]  Ferritin 668      [01-02-20 @ 09:39]  PTH -- (Ca 8.0)      [01-02-20 @ 17:06]   119  PTH -- (Ca 9.1)      [05-30-19 @ 18:09]   107  PTH -- (Ca 7.9)      [01-17-19 @ 16:26]   175  PTH -- (Ca 8.1)      [01-16-19 @ 16:55]   148  Vitamin D (25OH) 25.2      [01-02-20 @ 17:06]  HbA1c 5.8      [12-26-19 @ 09:04]  TSH 1.10      [05-17-19 @ 12:21]    HBsAb 18.0      [12-26-19 @ 22:01]  HBsAg Nonreact      [12-26-19 @ 22:01]  HBcAb Nonreact      [12-26-19 @ 22:01]  HCV 0.20, Nonreact      [12-26-19 @ 22:01] [facility] DIVISION OF KIDNEY DISEASES AND HYPERTENSION -- HEMODIALYSIS NOTE  --------------------------------------------------------------------------------  Chief Complaint: ESRD/Ongoing hemodialysis requirement    24 hour events/subjective:  No acute events      PAST HISTORY  --------------------------------------------------------------------------------  No significant changes to PMH, PSH, FHx, SHx, unless otherwise noted    ALLERGIES & MEDICATIONS  --------------------------------------------------------------------------------  Allergies    ACE inhibitors (Other (Mild))    Intolerances    oxycodone (Sedation/Somnol)    Standing Inpatient Medications  albuterol/ipratropium for Nebulization. 3 milliLiter(s) Nebulizer every 6 hours  aspirin  chewable 81 milliGRAM(s) Oral daily  chlorhexidine 2% Cloths 1 Application(s) Topical <User Schedule>  chlorhexidine 4% Liquid 1 Application(s) Topical <User Schedule>  collagenase Ointment 1 Application(s) Topical daily  dextrose 5%. 1000 milliLiter(s) IV Continuous <Continuous>  dextrose 50% Injectable 12.5 Gram(s) IV Push once  dextrose 50% Injectable 25 Gram(s) IV Push once  dextrose 50% Injectable 25 Gram(s) IV Push once  epoetin sara Injectable 45080 Unit(s) IV Push <User Schedule>  heparin  Injectable 5000 Unit(s) SubCutaneous every 8 hours  insulin lispro (HumaLOG) corrective regimen sliding scale   SubCutaneous every 6 hours  lactulose Syrup 10 Gram(s) Oral two times a day  pantoprazole  Injectable 40 milliGRAM(s) IV Push two times a day    PRN Inpatient Medications  acetaminophen  Suppository .. 650 milliGRAM(s) Rectal every 6 hours PRN  dextrose 40% Gel 15 Gram(s) Oral once PRN  glucagon  Injectable 1 milliGRAM(s) IntraMuscular once PRN  morphine  - Injectable 2 milliGRAM(s) IV Push every 4 hours PRN  morphine  - Injectable 1 milliGRAM(s) SubCutaneous every 4 hours PRN  sodium chloride 0.9% lock flush 10 milliLiter(s) IV Push every 1 hour PRN      REVIEW OF SYSTEMS  --------------------------------------------------------------------------------  Gen: No weight changes, fatigue, fevers/chills, weakness  Skin: No rashes  Head/Eyes/Ears/Mouth: No headache  Respiratory: No dyspnea, cough,  CV: No chest pain, orthopnea  GI: No abdominal pain, diarrhea, constipation, nausea, vomiting,  MSK: No joint pain  Neuro: No dizziness/lightheadedness, weakness  Heme: No bleeding  Psych: No significant nervousness, anxiety, stress, depression    All other systems were reviewed and are negative, except as noted.    VITALS/PHYSICAL EXAM  --------------------------------------------------------------------------------  T(C): 36.9 (01-16-20 @ 08:10), Max: 37.2 (01-15-20 @ 15:42)  HR: 96 (01-16-20 @ 08:10) (89 - 96)  BP: 133/72 (01-16-20 @ 08:10) (114/68 - 133/72)  RR: 19 (01-16-20 @ 08:10) (18 - 19)  SpO2: 96% (01-16-20 @ 11:01) (91% - 98%)  Wt(kg): --        Physical Exam:  	Gen: NAD, well-appearing  	HEENT:  supple neck  	Pulm: CTA B/L  	CV: RRR, S1S2; no rub  	Abd: +BS, soft, nontender  	UE: Warm, intact strength; no asterixis  	LE: Warm, + edema  	Neuro: No focal deficits  	Psych: Normal affect and mood  	Skin: Warm, without rashes  	Vascular access: RIJ non tunneled HD catheter     LABS/STUDIES  --------------------------------------------------------------------------------              9.2    8.19  >-----------<  290      [01-14-20 @ 16:22]              27.8     138  |  99  |  40.0  ----------------------------<  260      [01-14-20 @ 16:22]  3.8   |  26.0  |  3.50        Ca     7.8     [01-14-20 @ 16:22]    Iron 25, TIBC 154, %sat 16      [01-02-20 @ 09:39]  Ferritin 668      [01-02-20 @ 09:39]  PTH -- (Ca 8.0)      [01-02-20 @ 17:06]   119  PTH -- (Ca 9.1)      [05-30-19 @ 18:09]   107  PTH -- (Ca 7.9)      [01-17-19 @ 16:26]   175  PTH -- (Ca 8.1)      [01-16-19 @ 16:55]   148  Vitamin D (25OH) 25.2      [01-02-20 @ 17:06]  HbA1c 5.8      [12-26-19 @ 09:04]  TSH 1.10      [05-17-19 @ 12:21]    HBsAb 18.0      [12-26-19 @ 22:01]  HBsAg Nonreact      [12-26-19 @ 22:01]  HBcAb Nonreact      [12-26-19 @ 22:01]  HCV 0.20, Nonreact      [12-26-19 @ 22:01]

## 2020-01-16 NOTE — PROGRESS NOTE ADULT - SUBJECTIVE AND OBJECTIVE BOX
CC: Acute hypoxemic respiratory failure, GIB (12 Jan 2020 19:06)    INTERVAL HPI/OVERNIGHT EVENTS:  patient this morning without acute events  aox2  patient however this afternoon had RRT called for drop in O2 sat  patient had recovery soon there after  mentation unchanged  patient without chest pain  titrated from NRB to 4 l NC  CXR showed fluid throughout  dialysis transitioned to 4 deirdre    PHYSICAL EXAM:  General: mild distress due to pain  Eyes: opacified right eye, left eye pupil reactive and EOMI  Neck: obese  Respiratory: bilateral rhonchi/rales  Cardiovascular: Regular rate and rhythm. S1 and S2 Normal; loud systolic murmur  Gastrointestinal: Soft distended; tender in the L quadrants.  Genitourinary: Left back CVA tenderness/flank pain  Extremities: Normal range of motion, No clubbing, cyanosis or edema; deep wound on the right buttock adjacent to sacrum atleast 2cm deep, pink tissue at the base with topical medications, no surrounding erythema or necrotic areas.  Neurological: Alert and oriented to person and place  Skin: Warm and dry. No acute rash  Psychiatric: Cooperative and appropriate; flat affect                          9.7    12.78 )-----------( 313      ( 16 Jan 2020 12:41 )             28.4     01-16    132<L>  |  96<L>  |  39.0<H>  ----------------------------<  274<H>  3.5   |  25.0  |  3.14<H>    Ca    7.9<L>      16 Jan 2020 12:40    TPro  7.5  /  Alb  2.1<L>  /  TBili  0.6  /  DBili  x   /  AST  32<H>  /  ALT  18  /  AlkPhos  209<H>  01-16    Blood Gas Profile - Arterial (01.16.20 @ 12:28)    pH, Arterial: 7.49    pCO2, Arterial: 39 mmHg    pO2, Arterial: 200 mmHg    HCO3, Arterial: 30 mmoL/L    Base Excess, Arterial: 6.0 mmol/L    FIO2, Arterial: 100% NRB,    Blood Gas Comments Arterial: non-rebreather placed on patient.    Blood Gas Source Arterial: Arterial    < from: Xray Chest 1 View- PORTABLE-Urgent (01.16.20 @ 13:34) >  Right mid to lower lung infiltrate unchanged. Stable small right pleural effusion.    Marked cardiomegaly unchanged.    Right dialysis catheter and sternotomy wires reidentified.    < end of copied text >    MEDICATIONS  (STANDING):  albuterol/ipratropium for Nebulization. 3 milliLiter(s) Nebulizer every 6 hours  aspirin  chewable 81 milliGRAM(s) Oral daily  chlorhexidine 2% Cloths 1 Application(s) Topical <User Schedule>  chlorhexidine 4% Liquid 1 Application(s) Topical <User Schedule>  collagenase Ointment 1 Application(s) Topical daily  dextrose 5%. 1000 milliLiter(s) (50 mL/Hr) IV Continuous <Continuous>  dextrose 50% Injectable 12.5 Gram(s) IV Push once  dextrose 50% Injectable 25 Gram(s) IV Push once  dextrose 50% Injectable 25 Gram(s) IV Push once  epoetin sara Injectable 22244 Unit(s) IV Push <User Schedule>  heparin  Injectable 5000 Unit(s) SubCutaneous every 8 hours  insulin lispro (HumaLOG) corrective regimen sliding scale   SubCutaneous every 6 hours  lactulose Retention Enema 200 Gram(s) Rectal two times a day  pantoprazole  Injectable 40 milliGRAM(s) IV Push two times a day  sodium chloride 0.9%. 1000 milliLiter(s) (20 mL/Hr) IV Continuous <Continuous>    MEDICATIONS  (PRN):  acetaminophen  Suppository .. 650 milliGRAM(s) Rectal every 6 hours PRN Temp greater or equal to 38C (100.4F), Mild Pain (1 - 3)  dextrose 40% Gel 15 Gram(s) Oral once PRN Blood Glucose LESS THAN 70 milliGRAM(s)/deciLiter  glucagon  Injectable 1 milliGRAM(s) IntraMuscular once PRN Glucose <70 milliGRAM(s)/deciLiter  morphine  - Injectable 1 milliGRAM(s) SubCutaneous every 4 hours PRN Moderate Pain (4 - 6)  sodium chloride 0.9% lock flush 10 milliLiter(s) IV Push every 1 hour PRN Pre/post blood products, medications, blood draw, and to maintain line patency    RADIOLOGY & ADDITIONAL TESTS:

## 2020-01-16 NOTE — PROGRESS NOTE ADULT - ASSESSMENT
63 yo female with hx of CHFpEF, CAD, CKD, HTN, DM, cirrhosis, portal gastropathy, recurrent GI bleed, blind, sacral decub ulcer, presented to the ED from nursing home with encephalopathy, hypoxic resp failure requiring bipap, in ED found to have anemia with Hb 5 that responded to blood transfusions (hx of recurrent GI bleed for which multiple EGDs/colonoscopies have been performed, no further invasive work up as per GI). Also noted to have sepsis on admission that was secondary to PNA, possibly gram negative bacteria. Patient completed a course of zosyn. Also with KEITH on CKD now requiring HD via left IJ. Tunnelled cath placed for access on 1/3/20. Patient failed s/s and is NPO. NGT feeding was started and family to make now opting for PEG. Had upper ext mapping US showed left brachial veins DVT around the catheter and superficial right basilic and left cephalic vein thrombosis which vascular recommends full anticoagulation if not contraindicated due to GI bleed on admission; discussed with GI to hold on full dose anticoagulation for now.    Patient was awaiting placement in NH/dialysis center. Patient on 1/16 had RRT for hypoxia; likely aspiration event. She was transferred to [address] [place] [address] as they did not feel she was stable for HD in the dialysis center. She received midodrine prior to dialysis. Planned to given albumin with lasix however this was deferred as her vitals improved. She will get dialysis today on [address] [place] [address].    #AHRF - secondary to aspiration event  - CXR with known infiltrate in the right lung  - may be a component of fluid overload given CXR and abdominal CT findings  - will discuss with nephrology regarding daily dialysis for fluid removal    #back pain  - may be musculoskeletal from bed  - had UA and CT abdomen done to evaluate    #Oropharyngeal dysphagia   - failed MBS abd swallow evaluation  - s/p PEG , tolerating feeding   - at goal of 60cc/hour    #Unstageable Sacral Decub - no signs of infection at this time  - s/p debridement bedside x 2 by surgery  - no debridement planned at present - for now will continue with current dressing changes; not a candidate for wound vac at this time per surgery  - cont daily wound care repositioning  - should place primafit - discussed with nursing    #Right pneumonia - suspected  aspiration  - treated initially abx restarted  for worsening infiltrate / CXR  - short course treatment course completed    #bilateral superficial vein thrombosis on US doppler of upper ext and DVT on left brachial  vein around the catheter  - vascular rec anticoagulation due to GI bleed will hold off;  - poor candidate for full anticoagulation due to GI bleed on this admission and again in the past  - cont aspirin for now    #Acute on chronic diastolic CHF/ Acute hypoxic respiratory failure- improving   - cont HD for fluid removal   - No longer requiring bipap  - on NC comfortable     #Acute blood loss Anemia  - Hx of recurrent GI bleed? prior EGDs/ colonoscopies this year which were negative, prior CTA with +transverse colon bleed (Jan of 2019)  - Seen by GI on this admission no further work up   - Responded to blood transfusions   - repeat blood transfusion required for drop in hgb  - cont Protonix daily     #s/p Metabolic vs Uremic vs Hepatic Encephalopathy  - stable    #CASTA[place]EDA Cirrhosis with portal gastropathy and AVMs  - change lactulose to standing through PEG with holding parameters  - c/w rifaximin  - cont PPI  - ammonia level 45 this AM    #KEITH/ATN on CKD- started on new HD   - cont HD per nephrology   - need dialysis seat in the community    #DM2- cont sliding scale insulin coverage   - Well controlled  - Cont sliding scale insulin coverage     Eventual discharge  to [facility]/NH; palliative care evaluation prior however at this time family not for DNR/DNI and want all possible treatment options.

## 2020-01-17 LAB
GLUCOSE BLDC GLUCOMTR-MCNC: 194 MG/DL — HIGH (ref 70–99)
GLUCOSE BLDC GLUCOMTR-MCNC: 199 MG/DL — HIGH (ref 70–99)
GLUCOSE BLDC GLUCOMTR-MCNC: 220 MG/DL — HIGH (ref 70–99)
GLUCOSE BLDC GLUCOMTR-MCNC: 237 MG/DL — HIGH (ref 70–99)

## 2020-01-17 PROCEDURE — 99233 SBSQ HOSP IP/OBS HIGH 50: CPT

## 2020-01-17 PROCEDURE — 99232 SBSQ HOSP IP/OBS MODERATE 35: CPT

## 2020-01-17 RX ADMIN — MORPHINE SULFATE 2 MILLIGRAM(S): 50 CAPSULE, EXTENDED RELEASE ORAL at 19:47

## 2020-01-17 RX ADMIN — MORPHINE SULFATE 2 MILLIGRAM(S): 50 CAPSULE, EXTENDED RELEASE ORAL at 15:20

## 2020-01-17 RX ADMIN — Medication 4: at 07:33

## 2020-01-17 RX ADMIN — LACTULOSE 10 GRAM(S): 10 SOLUTION ORAL at 05:40

## 2020-01-17 RX ADMIN — Medication 1 APPLICATION(S): at 05:40

## 2020-01-17 RX ADMIN — CHLORHEXIDINE GLUCONATE 1 APPLICATION(S): 213 SOLUTION TOPICAL at 05:39

## 2020-01-17 RX ADMIN — Medication 4: at 15:08

## 2020-01-17 RX ADMIN — MORPHINE SULFATE 2 MILLIGRAM(S): 50 CAPSULE, EXTENDED RELEASE ORAL at 08:01

## 2020-01-17 RX ADMIN — Medication 3 MILLILITER(S): at 21:04

## 2020-01-17 RX ADMIN — MORPHINE SULFATE 2 MILLIGRAM(S): 50 CAPSULE, EXTENDED RELEASE ORAL at 03:31

## 2020-01-17 RX ADMIN — MORPHINE SULFATE 2 MILLIGRAM(S): 50 CAPSULE, EXTENDED RELEASE ORAL at 15:09

## 2020-01-17 RX ADMIN — PANTOPRAZOLE SODIUM 40 MILLIGRAM(S): 20 TABLET, DELAYED RELEASE ORAL at 05:40

## 2020-01-17 RX ADMIN — Medication 3 MILLILITER(S): at 15:15

## 2020-01-17 RX ADMIN — HEPARIN SODIUM 5000 UNIT(S): 5000 INJECTION INTRAVENOUS; SUBCUTANEOUS at 21:40

## 2020-01-17 RX ADMIN — HEPARIN SODIUM 5000 UNIT(S): 5000 INJECTION INTRAVENOUS; SUBCUTANEOUS at 05:40

## 2020-01-17 RX ADMIN — MORPHINE SULFATE 2 MILLIGRAM(S): 50 CAPSULE, EXTENDED RELEASE ORAL at 03:16

## 2020-01-17 RX ADMIN — HEPARIN SODIUM 5000 UNIT(S): 5000 INJECTION INTRAVENOUS; SUBCUTANEOUS at 15:09

## 2020-01-17 RX ADMIN — MORPHINE SULFATE 2 MILLIGRAM(S): 50 CAPSULE, EXTENDED RELEASE ORAL at 20:17

## 2020-01-17 RX ADMIN — Medication 2: at 01:10

## 2020-01-17 RX ADMIN — PANTOPRAZOLE SODIUM 40 MILLIGRAM(S): 20 TABLET, DELAYED RELEASE ORAL at 17:10

## 2020-01-17 RX ADMIN — Medication 2: at 17:14

## 2020-01-17 RX ADMIN — MORPHINE SULFATE 2 MILLIGRAM(S): 50 CAPSULE, EXTENDED RELEASE ORAL at 08:15

## 2020-01-17 RX ADMIN — Medication 81 MILLIGRAM(S): at 15:09

## 2020-01-17 NOTE — PROGRESS NOTE ADULT - SUBJECTIVE AND OBJECTIVE BOX
[facility] DIVISION OF KIDNEY DISEASES AND HYPERTENSION -- HEMODIALYSIS NOTE  --------------------------------------------------------------------------------  Chief Complaint: ESRD/Ongoing hemodialysis requirement    24 hour events/subjective:  s/p HD yesterday; 1.0 L UF           PAST HISTORY  --------------------------------------------------------------------------------  No significant changes to PMH, PSH, FHx, SHx, unless otherwise noted    ALLERGIES & MEDICATIONS  --------------------------------------------------------------------------------  Allergies    ACE inhibitors (Other (Mild))    Intolerances    oxycodone (Sedation/Somnol)    Standing Inpatient Medications  albuterol/ipratropium for Nebulization. 3 milliLiter(s) Nebulizer every 6 hours  aspirin  chewable 81 milliGRAM(s) Oral daily  chlorhexidine 2% Cloths 1 Application(s) Topical <User Schedule>  chlorhexidine 4% Liquid 1 Application(s) Topical <User Schedule>  collagenase Ointment 1 Application(s) Topical daily  dextrose 5%. 1000 milliLiter(s) IV Continuous <Continuous>  dextrose 50% Injectable 12.5 Gram(s) IV Push once  dextrose 50% Injectable 25 Gram(s) IV Push once  dextrose 50% Injectable 25 Gram(s) IV Push once  epoetin sara Injectable 84057 Unit(s) IV Push <User Schedule>  heparin  Injectable 5000 Unit(s) SubCutaneous every 8 hours  insulin lispro (HumaLOG) corrective regimen sliding scale   SubCutaneous every 6 hours  lactulose Syrup 10 Gram(s) Oral two times a day  pantoprazole  Injectable 40 milliGRAM(s) IV Push two times a day    PRN Inpatient Medications  acetaminophen  Suppository .. 650 milliGRAM(s) Rectal every 6 hours PRN  dextrose 40% Gel 15 Gram(s) Oral once PRN  glucagon  Injectable 1 milliGRAM(s) IntraMuscular once PRN  morphine  - Injectable 2 milliGRAM(s) IV Push every 4 hours PRN  sodium chloride 0.9% lock flush 10 milliLiter(s) IV Push every 1 hour PRN      REVIEW OF SYSTEMS  --------------------------------------------------------------------------------  Gen: No weight changes, fatigue, fevers/chills, weakness  Skin: No rashes  Head/Eyes/Ears/Mouth: No headache  Respiratory: No dyspnea, cough,  CV: No chest pain, orthopnea  GI: No abdominal pain, diarrhea, constipation, nausea, vomiting,  MSK: No joint pain  Neuro: No dizziness/lightheadedness, weakness  Heme: No bleeding  Psych: No significant nervousness, anxiety, stress, depression    All other systems were reviewed and are negative, except as noted.    VITALS/PHYSICAL EXAM  --------------------------------------------------------------------------------  T(C): 37.3 (01-17-20 @ 08:38), Max: 37.3 (01-17-20 @ 08:38)  HR: 89 (01-17-20 @ 08:00) (89 - 105)  BP: 118/55 (01-17-20 @ 08:00) (106/52 - 129/52)  RR: 15 (01-17-20 @ 08:00) (15 - 26)  SpO2: 99% (01-17-20 @ 08:00) (50% - 100%)  Wt(kg): --        01-16-20 @ 07:01  -  01-17-20 @ 07:00  --------------------------------------------------------  IN: 850 mL / OUT: 1000 mL / NET: -150 mL      Physical Exam:  	Gen: NAD, well-appearing  	HEENT: PERRL, supple neck,  	Pulm: CTA B/L  	CV: RRR, S1S2; no rub  	Abd: +BS, soft, nontender  	UE: Warm, intact strength; no asterixis  	LE: Warm, + edema  	Neuro: No focal deficits  	Psych: Normal affect and mood  	Skin: Warm, without rashes  	Vascular access:    LABS/STUDIES  --------------------------------------------------------------------------------              9.7    12.78 >-----------<  313      [01-16-20 @ 12:41]              28.4     132  |  96  |  39.0  ----------------------------<  274      [01-16-20 @ 12:40]  3.5   |  25.0  |  3.14        Ca     7.9     [01-16-20 @ 12:40]    TPro  7.5  /  Alb  2.1  /  TBili  0.6  /  DBili  x   /  AST  32  /  ALT  18  /  AlkPhos  209  [01-16-20 @ 12:40]    Iron 25, TIBC 154, %sat 16      [01-02-20 @ 09:39]  Ferritin 668      [01-02-20 @ 09:39]  PTH -- (Ca 8.0)      [01-02-20 @ 17:06]   119  PTH -- (Ca 9.1)      [05-30-19 @ 18:09]   107  PTH -- (Ca 7.9)      [01-17-19 @ 16:26]   175  Vitamin D (25OH) 25.2      [01-02-20 @ 17:06]  HbA1c 5.8      [12-26-19 @ 09:04]  TSH 1.10      [05-17-19 @ 12:21]    HBsAb 18.0      [12-26-19 @ 22:01]  HBsAg Nonreact      [12-26-19 @ 22:01]  HBcAb Nonreact      [12-26-19 @ 22:01]  HCV 0.20, Nonreact      [12-26-19 @ 22:01] [facility] DIVISION OF KIDNEY DISEASES AND HYPERTENSION -- HEMODIALYSIS NOTE  --------------------------------------------------------------------------------  Chief Complaint: ESRD/Ongoing hemodialysis requirement    24 hour events/subjective:  s/p HD yesterday; 1.0 L UF           PAST HISTORY  --------------------------------------------------------------------------------  No significant changes to PMH, PSH, FHx, SHx, unless otherwise noted    ALLERGIES & MEDICATIONS  --------------------------------------------------------------------------------  Allergies  ACE inhibitors (Other (Mild))    Intolerances  oxycodone (Sedation/Somnol)    Standing Inpatient Medications  albuterol/ipratropium for Nebulization. 3 milliLiter(s) Nebulizer every 6 hours  aspirin  chewable 81 milliGRAM(s) Oral daily  chlorhexidine 2% Cloths 1 Application(s) Topical <User Schedule>  chlorhexidine 4% Liquid 1 Application(s) Topical <User Schedule>  collagenase Ointment 1 Application(s) Topical daily  dextrose 5%. 1000 milliLiter(s) IV Continuous <Continuous>  dextrose 50% Injectable 12.5 Gram(s) IV Push once  dextrose 50% Injectable 25 Gram(s) IV Push once  dextrose 50% Injectable 25 Gram(s) IV Push once  epoetin sara Injectable 40224 Unit(s) IV Push <User Schedule>  heparin  Injectable 5000 Unit(s) SubCutaneous every 8 hours  insulin lispro (HumaLOG) corrective regimen sliding scale   SubCutaneous every 6 hours  lactulose Syrup 10 Gram(s) Oral two times a day  pantoprazole  Injectable 40 milliGRAM(s) IV Push two times a day    PRN Inpatient Medications  acetaminophen  Suppository .. 650 milliGRAM(s) Rectal every 6 hours PRN  dextrose 40% Gel 15 Gram(s) Oral once PRN  glucagon  Injectable 1 milliGRAM(s) IntraMuscular once PRN  morphine  - Injectable 2 milliGRAM(s) IV Push every 4 hours PRN  sodium chloride 0.9% lock flush 10 milliLiter(s) IV Push every 1 hour PRN      REVIEW OF SYSTEMS  --------------------------------------------------------------------------------  Unable to assess. Patient confused/moaning.    VITALS/PHYSICAL EXAM  --------------------------------------------------------------------------------  T(C): 37.3 (01-17-20 @ 08:38), Max: 37.3 (01-17-20 @ 08:38)  HR: 89 (01-17-20 @ 08:00) (89 - 105)  BP: 118/55 (01-17-20 @ 08:00) (106/52 - 129/52)  RR: 15 (01-17-20 @ 08:00) (15 - 26)  SpO2: 99% (01-17-20 @ 08:00) (50% - 100%)        01-16-20 @ 07:01  -  01-17-20 @ 07:00  --------------------------------------------------------  IN: 850 mL / OUT: 1000 mL / NET: -150 mL      Physical Exam:  	Gen: NAD, well-appearing  	HEENT: PERRL, supple neck. On nasal cannula  	Pulm: CTA B/L  	CV: RRR, S1S2; no rub  	Abd: +BS, soft, nontender. PEG tube with tube feeding.  	UE: Warm, intact strength; no asterixis  	LE: Warm, + edema  	Neuro: No focal deficits  	Psych: Normal affect and mood  	Skin: Warm  	Vascular access: R IJ tunneled catheter    LABS/STUDIES  --------------------------------------------------------------------------------              9.7    12.78 >-----------<  313      [01-16-20 @ 12:41]              28.4     132  |  96  |  39.0  ----------------------------<  274      [01-16-20 @ 12:40]  3.5   |  25.0  |  3.14        Ca     7.9     [01-16-20 @ 12:40]    TPro  7.5  /  Alb  2.1  /  TBili  0.6  /  DBili  x   /  AST  32  /  ALT  18  /  AlkPhos  209  [01-16-20 @ 12:40]    Iron 25, TIBC 154, %sat 16      [01-02-20 @ 09:39]  Ferritin 668      [01-02-20 @ 09:39]  PTH -- (Ca 8.0)      [01-02-20 @ 17:06]   119  PTH -- (Ca 9.1)      [05-30-19 @ 18:09]   107  PTH -- (Ca 7.9)      [01-17-19 @ 16:26]   175  Vitamin D (25OH) 25.2      [01-02-20 @ 17:06]  HbA1c 5.8      [12-26-19 @ 09:04]  TSH 1.10      [05-17-19 @ 12:21]    HBsAb 18.0      [12-26-19 @ 22:01]  HBsAg Nonreact      [12-26-19 @ 22:01]  HBcAb Nonreact      [12-26-19 @ 22:01]  HCV 0.20, Nonreact      [12-26-19 @ 22:01] [facility] DIVISION OF KIDNEY DISEASES AND HYPERTENSION -- HEMODIALYSIS NOTE  --------------------------------------------------------------------------------  Chief Complaint: ESRD/Ongoing hemodialysis requirement    24 hour events/subjective:  s/p HD yesterday; 1.0 L UF           PAST HISTORY  --------------------------------------------------------------------------------  No significant changes to PMH, PSH, FHx, SHx, unless otherwise noted    ALLERGIES & MEDICATIONS  --------------------------------------------------------------------------------  Allergies  ACE inhibitors (Other (Mild))    Intolerances  oxycodone (Sedation/Somnol)    Standing Inpatient Medications  albuterol/ipratropium for Nebulization. 3 milliLiter(s) Nebulizer every 6 hours  aspirin  chewable 81 milliGRAM(s) Oral daily  chlorhexidine 2% Cloths 1 Application(s) Topical <User Schedule>  chlorhexidine 4% Liquid 1 Application(s) Topical <User Schedule>  collagenase Ointment 1 Application(s) Topical daily  dextrose 5%. 1000 milliLiter(s) IV Continuous <Continuous>  dextrose 50% Injectable 12.5 Gram(s) IV Push once  dextrose 50% Injectable 25 Gram(s) IV Push once  dextrose 50% Injectable 25 Gram(s) IV Push once  epoetin sara Injectable 28609 Unit(s) IV Push <User Schedule>  heparin  Injectable 5000 Unit(s) SubCutaneous every 8 hours  insulin lispro (HumaLOG) corrective regimen sliding scale   SubCutaneous every 6 hours  lactulose Syrup 10 Gram(s) Oral two times a day  pantoprazole  Injectable 40 milliGRAM(s) IV Push two times a day    PRN Inpatient Medications  acetaminophen  Suppository .. 650 milliGRAM(s) Rectal every 6 hours PRN  dextrose 40% Gel 15 Gram(s) Oral once PRN  glucagon  Injectable 1 milliGRAM(s) IntraMuscular once PRN  morphine  - Injectable 2 milliGRAM(s) IV Push every 4 hours PRN  sodium chloride 0.9% lock flush 10 milliLiter(s) IV Push every 1 hour PRN      REVIEW OF SYSTEMS  --------------------------------------------------------------------------------  Unable to assess. Patient confused/moaning.    VITALS/PHYSICAL EXAM  --------------------------------------------------------------------------------  T(C): 37.3 (01-17-20 @ 08:38), Max: 37.3 (01-17-20 @ 08:38)  HR: 89 (01-17-20 @ 08:00) (89 - 105)  BP: 118/55 (01-17-20 @ 08:00) (106/52 - 129/52)  RR: 15 (01-17-20 @ 08:00) (15 - 26)  SpO2: 99% (01-17-20 @ 08:00) (50% - 100%)        01-16-20 @ 07:01  -  01-17-20 @ 07:00  --------------------------------------------------------  IN: 850 mL / OUT: 1000 mL / NET: -150 mL      Physical Exam:  	Gen: NAD, well-appearing  	HEENT: PERRL, supple neck. On nasal cannula  	Pulm: CTA B/L  	CV: RRR, S1S2; no rub. 2/6 systolic murmur  	Abd: +BS, soft, nontender. PEG tube with tube feeding.  	UE: Warm, intact strength; no asterixis  	LE: Warm, + edema  	Neuro: No focal deficits  	Psych: Normal affect and mood  	Skin: Warm  	Vascular access: R IJ tunneled catheter    LABS/STUDIES  --------------------------------------------------------------------------------              9.7    12.78 >-----------<  313      [01-16-20 @ 12:41]              28.4     132  |  96  |  39.0  ----------------------------<  274      [01-16-20 @ 12:40]  3.5   |  25.0  |  3.14        Ca     7.9     [01-16-20 @ 12:40]    TPro  7.5  /  Alb  2.1  /  TBili  0.6  /  DBili  x   /  AST  32  /  ALT  18  /  AlkPhos  209  [01-16-20 @ 12:40]    Iron 25, TIBC 154, %sat 16      [01-02-20 @ 09:39]  Ferritin 668      [01-02-20 @ 09:39]  PTH -- (Ca 8.0)      [01-02-20 @ 17:06]   119  PTH -- (Ca 9.1)      [05-30-19 @ 18:09]   107  PTH -- (Ca 7.9)      [01-17-19 @ 16:26]   175  Vitamin D (25OH) 25.2      [01-02-20 @ 17:06]  HbA1c 5.8      [12-26-19 @ 09:04]  TSH 1.10      [05-17-19 @ 12:21]    HBsAb 18.0      [12-26-19 @ 22:01]  HBsAg Nonreact      [12-26-19 @ 22:01]  HBcAb Nonreact      [12-26-19 @ 22:01]  HCV 0.20, Nonreact      [12-26-19 @ 22:01] [facility] DIVISION OF KIDNEY DISEASES AND HYPERTENSION -- HEMODIALYSIS NOTE  --------------------------------------------------------------------------------  Chief Complaint: ESRD/Ongoing hemodialysis requirement    24 hour events/subjective:  RRT called yesterday in dialysis suite for hypoxia likely from aspiration  Completed HD yesterday in tele;  tolerated 1.0 L UF   Pt seen and examined; pt is confused          PAST HISTORY  --------------------------------------------------------------------------------  No significant changes to PMH, PSH, FHx, SHx, unless otherwise noted    ALLERGIES & MEDICATIONS  --------------------------------------------------------------------------------  Allergies  ACE inhibitors (Other (Mild))    Intolerances  oxycodone (Sedation/Somnol)    Standing Inpatient Medications  albuterol/ipratropium for Nebulization. 3 milliLiter(s) Nebulizer every 6 hours  aspirin  chewable 81 milliGRAM(s) Oral daily  chlorhexidine 2% Cloths 1 Application(s) Topical <User Schedule>  chlorhexidine 4% Liquid 1 Application(s) Topical <User Schedule>  collagenase Ointment 1 Application(s) Topical daily  dextrose 5%. 1000 milliLiter(s) IV Continuous <Continuous>  dextrose 50% Injectable 12.5 Gram(s) IV Push once  dextrose 50% Injectable 25 Gram(s) IV Push once  dextrose 50% Injectable 25 Gram(s) IV Push once  epoetin sara Injectable 19555 Unit(s) IV Push <User Schedule>  heparin  Injectable 5000 Unit(s) SubCutaneous every 8 hours  insulin lispro (HumaLOG) corrective regimen sliding scale   SubCutaneous every 6 hours  lactulose Syrup 10 Gram(s) Oral two times a day  pantoprazole  Injectable 40 milliGRAM(s) IV Push two times a day    PRN Inpatient Medications  acetaminophen  Suppository .. 650 milliGRAM(s) Rectal every 6 hours PRN  dextrose 40% Gel 15 Gram(s) Oral once PRN  glucagon  Injectable 1 milliGRAM(s) IntraMuscular once PRN  morphine  - Injectable 2 milliGRAM(s) IV Push every 4 hours PRN  sodium chloride 0.9% lock flush 10 milliLiter(s) IV Push every 1 hour PRN      REVIEW OF SYSTEMS  --------------------------------------------------------------------------------  Unable to assess. Patient confused/moaning.    VITALS/PHYSICAL EXAM  --------------------------------------------------------------------------------  T(C): 37.3 (01-17-20 @ 08:38), Max: 37.3 (01-17-20 @ 08:38)  HR: 89 (01-17-20 @ 08:00) (89 - 105)  BP: 118/55 (01-17-20 @ 08:00) (106/52 - 129/52)  RR: 15 (01-17-20 @ 08:00) (15 - 26)  SpO2: 99% (01-17-20 @ 08:00) (50% - 100%)        01-16-20 @ 07:01  -  01-17-20 @ 07:00  --------------------------------------------------------  IN: 850 mL / OUT: 1000 mL / NET: -150 mL      Physical Exam:  	Gen: confused, moaning  	HEENT:  supple neck. On nasal cannula  	Pulm: CTA B/L ant  	CV: RRR, S1S2; no rub. systolic murmur  	Abd: +BS, soft, nontender. PEG tube with tube feeding  	UE: Warm, intact strength  	LE: Warm, + edema  	Neuro: No focal deficits  	Psych: confused  	Skin: Warm  	Vascular access: R IJ tunneled catheter    LABS/STUDIES  --------------------------------------------------------------------------------              9.7    12.78 >-----------<  313      [01-16-20 @ 12:41]              28.4     132  |  96  |  39.0  ----------------------------<  274      [01-16-20 @ 12:40]  3.5   |  25.0  |  3.14        Ca     7.9     [01-16-20 @ 12:40]    TPro  7.5  /  Alb  2.1  /  TBili  0.6  /  DBili  x   /  AST  32  /  ALT  18  /  AlkPhos  209  [01-16-20 @ 12:40]    Iron 25, TIBC 154, %sat 16      [01-02-20 @ 09:39]  Ferritin 668      [01-02-20 @ 09:39]  PTH -- (Ca 8.0)      [01-02-20 @ 17:06]   119  PTH -- (Ca 9.1)      [05-30-19 @ 18:09]   107  PTH -- (Ca 7.9)      [01-17-19 @ 16:26]   175  Vitamin D (25OH) 25.2      [01-02-20 @ 17:06]  HbA1c 5.8      [12-26-19 @ 09:04]  TSH 1.10      [05-17-19 @ 12:21]    HBsAb 18.0      [12-26-19 @ 22:01]  HBsAg Nonreact      [12-26-19 @ 22:01]  HBcAb Nonreact      [12-26-19 @ 22:01]  HCV 0.20, Nonreact      [12-26-19 @ 22:01]

## 2020-01-17 NOTE — PROGRESS NOTE ADULT - ASSESSMENT
1) KEITH on CKD- now deemed ESRD  2) encephalopathy- improved  4) Acute blood loss anemia superimposed on anemia of CKD  5) Hypotension- BP now improved    HD TTS schedule; next HD tomorrow  Needs AVF, outpt HD set up  Continue QUENTIN , PRBC transfusion prn with HD  Monitor lytes, BP and UOP    Will follow

## 2020-01-17 NOTE — PROGRESS NOTE ADULT - ASSESSMENT
65 yo female with hx of CHFpEF, CAD, CKD, HTN, DM, cirrhosis, portal gastropathy, recurrent GI bleed, blind, sacral decub ulcer, presented to the ED from nursing home with encephalopathy, hypoxic resp failure requiring bipap, in ED found to have anemia with Hb 5 that responded to blood transfusions (hx of recurrent GI bleed for which multiple EGDs/colonoscopies have been performed, no further invasive work up as per GI). Also noted to have sepsis on admission that was secondary to PNA, possibly gram negative bacteria. Patient completed a course of zosyn. Also with KEITH on CKD now requiring HD via left IJ. Right chest tunnelled cath placed for access on 1/3/20. Patient failed s/s and is NPO. NGT feeding was started and family to make now opting for PEG. Had upper ext mapping US showed left brachial veins DVT around the catheter and superficial right basilic and left cephalic vein thrombosis which vascular recommends full anticoagulation if not contraindicated due to GI bleed on admission; discussed with GI to hold on full dose anticoagulation for now.    Patient was awaiting placement in NH/dialysis center. Patient on 1/16 had RRT for hypoxia; likely aspiration event. She was transferred to [address] [place] [address] as they did not feel she was stable for HD in the dialysis center. She received midodrine prior to dialysis. Planned to given albumin with lasix however this was deferred as her vitals improved. She received dialysis yesterday on [address] [place] [address].    #AHRF - secondary to aspiration event  - CXR with known infiltrate in the right lung  - may be a component of fluid overload given CXR and abdominal CT findings      #back pain  - may be musculoskeletal from bed  - had UA and CT abdomen done to evaluate    #Oropharyngeal dysphagia   - failed MBS abd swallow evaluation  - s/p PEG , tolerating feeding   - at goal of 60cc/hour    #Unstageable Sacral Decub - no signs of infection at this time  - s/p debridement bedside x 2 by surgery  - no debridement planned at present - for now will continue with current dressing changes; not a candidate for wound vac at this time per surgery  - cont daily wound care repositioning  - should place primafit - discussed with nursing    #Right pneumonia - suspected  aspiration  - treated initially abx restarted  for worsening infiltrate / CXR  - short course treatment course completed    #bilateral superficial vein thrombosis on US doppler of upper ext and DVT on left brachial  vein around the catheter  - vascular rec anticoagulation due to GI bleed will hold off;  - poor candidate for full anticoagulation due to GI bleed on this admission and again in the past  - cont aspirin for now    #Acute on chronic diastolic CHF/ Acute hypoxic respiratory failure- improving   - cont HD for fluid removal   - No longer requiring bipap  - on NC comfortable     #Acute blood loss Anemia  - Hx of recurrent GI bleed? prior EGDs/ colonoscopies this year which were negative, prior CTA with +transverse colon bleed (Jan of 2019)  - Seen by GI on this admission no further work up   - Responded to blood transfusions   - repeat blood transfusion required for drop in hgb  - cont Protonix daily     #s/p Metabolic vs Uremic vs Hepatic Encephalopathy  - stable    #CASTAÑEDA Cirrhosis with portal gastropathy and AVMs  - change lactulose to standing through PEG with holding parameters  - c/w rifaximin  - cont PPI  - ammonia level 45 this AM    #KEITH/ATN on CKD- started on new HD   - cont HD per nephrology   - nephrology follow up appreciated  - HD TTS schedule; next HD tomorrow  - Needs AVF, outpt HD set up  - Continue QUENTIN , PRBC transfusion prn with HD  - Monitor lytes, BP and UOP  - need dialysis seat in the community    #DM2- cont sliding scale insulin coverage   - Well controlled  - Cont sliding scale insulin coverage     Eventual discharge to [facility]NH; palliative care evaluation prior however at this time family not for DNR/DNI and want all possible treatment options.  Pt needs AVF and HD seat. Vital signs stable, downgrade to medical floor.

## 2020-01-17 NOTE — PROGRESS NOTE ADULT - SUBJECTIVE AND OBJECTIVE BOX
JOSEFINA KUN Female 64y [id number]    Patient is a 64y old  Female who presents with a chief complaint of Acute hypoxemic respiratory failure, GIB (17 Jan 2020 11:33)      Subjective/objective:  Pt seen and examined at bedside, no over night event reported by night staff. Pt reports doing well and has no new complaints.    Review of system:  No fever, chills, nausea, vomiting, headache, dizziness, chest pain, SOB or palpitation.      PHYSICAL EXAM:    Vital Signs Last 24 Hrs  T(C): 37.3 (17 Jan 2020 08:38), Max: 37.3 (17 Jan 2020 08:38)  T(F): 99.2 (17 Jan 2020 08:38), Max: 99.2 (17 Jan 2020 08:38)  HR: 91 (17 Jan 2020 12:00) (89 - 105)  BP: 109/53 (17 Jan 2020 12:00) (109/53 - 129/52)  BP(mean): 68 (17 Jan 2020 12:00) (65 - 75)  RR: 23 (17 Jan 2020 12:00) (15 - 26)  SpO2: 97% (17 Jan 2020 12:00) (97% - 100%)  PHYSICAL EXAM:  General: mild distress due to pain  Eyes: opacified right eye, left eye pupil reactive and EOMI  Neck: obese  Respiratory: bilateral rhonchi/rales  Cardiovascular: Regular rate and rhythm. S1 and S2 Normal; loud systolic murmur  Gastrointestinal: Soft distended; tender in the L quadrants.  Genitourinary: Left back CVA tenderness/flank pain  Extremities: Normal range of motion, No clubbing, cyanosis or edema; deep wound on the right buttock adjacent to sacrum atleast 2cm deep, pink tissue at the base with topical medications, no surrounding erythema or necrotic areas.  Neurological: Alert and oriented to person and place  Skin: Warm and dry. No acute rash  Psychiatric: Cooperative and appropriate; flat affect    MEDICATIONS  (STANDING):  albuterol/ipratropium for Nebulization. 3 milliLiter(s) Nebulizer every 6 hours  aspirin  chewable 81 milliGRAM(s) Oral daily  chlorhexidine 2% Cloths 1 Application(s) Topical <User Schedule>  chlorhexidine 4% Liquid 1 Application(s) Topical <User Schedule>  collagenase Ointment 1 Application(s) Topical daily  dextrose 5%. 1000 milliLiter(s) (50 mL/Hr) IV Continuous <Continuous>  dextrose 50% Injectable 12.5 Gram(s) IV Push once  dextrose 50% Injectable 25 Gram(s) IV Push once  dextrose 50% Injectable 25 Gram(s) IV Push once  epoetin sara Injectable 78756 Unit(s) IV Push <User Schedule>  heparin  Injectable 5000 Unit(s) SubCutaneous every 8 hours  insulin lispro (HumaLOG) corrective regimen sliding scale   SubCutaneous every 6 hours  lactulose Syrup 10 Gram(s) Oral two times a day  pantoprazole  Injectable 40 milliGRAM(s) IV Push two times a day    MEDICATIONS  (PRN):  acetaminophen  Suppository .. 650 milliGRAM(s) Rectal every 6 hours PRN Temp greater or equal to 38C (100.4F), Mild Pain (1 - 3)  dextrose 40% Gel 15 Gram(s) Oral once PRN Blood Glucose LESS THAN 70 milliGRAM(s)/deciLiter  glucagon  Injectable 1 milliGRAM(s) IntraMuscular once PRN Glucose <70 milliGRAM(s)/deciLiter  morphine  - Injectable 2 milliGRAM(s) IV Push every 4 hours PRN Severe Pain (7 - 10)  sodium chloride 0.9% lock flush 10 milliLiter(s) IV Push every 1 hour PRN Pre/post blood products, medications, blood draw, and to maintain line patency      LABS:                        9.7    12.78 )-----------( 313      ( 16 Jan 2020 12:41 )             28.4     01-16    132<L>  |  96<L>  |  39.0<H>  ----------------------------<  274<H>  3.5   |  25.0  |  3.14<H>    Ca    7.9<L>      16 Jan 2020 12:40    TPro  7.5  /  Alb  2.1<L>  /  TBili  0.6  /  DBili  x   /  AST  32<H>  /  ALT  18  /  AlkPhos  209<H>  01-16        LIVER FUNCTIONS - ( 16 Jan 2020 12:40 )  Alb: 2.1 g/dL / Pro: 7.5 g/dL / ALK PHOS: 209 U/L / ALT: 18 U/L / AST: 32 U/L / GGT: x             ABG - ( 16 Jan 2020 12:28 )  pH, Arterial: 7.49  pH, Blood: x     /  pCO2: 39    /  pO2: 200   / HCO3: 30    / Base Excess: 6.0   /  SaO2: x

## 2020-01-18 LAB
GLUCOSE BLDC GLUCOMTR-MCNC: 113 MG/DL — HIGH (ref 70–99)
GLUCOSE BLDC GLUCOMTR-MCNC: 183 MG/DL — HIGH (ref 70–99)
GLUCOSE BLDC GLUCOMTR-MCNC: 193 MG/DL — HIGH (ref 70–99)
GLUCOSE BLDC GLUCOMTR-MCNC: 201 MG/DL — HIGH (ref 70–99)
GLUCOSE BLDC GLUCOMTR-MCNC: 95 MG/DL — SIGNIFICANT CHANGE UP (ref 70–99)

## 2020-01-18 PROCEDURE — 90937 HEMODIALYSIS REPEATED EVAL: CPT

## 2020-01-18 PROCEDURE — 99232 SBSQ HOSP IP/OBS MODERATE 35: CPT

## 2020-01-18 RX ADMIN — Medication 3 MILLILITER(S): at 21:12

## 2020-01-18 RX ADMIN — MORPHINE SULFATE 2 MILLIGRAM(S): 50 CAPSULE, EXTENDED RELEASE ORAL at 14:51

## 2020-01-18 RX ADMIN — MORPHINE SULFATE 2 MILLIGRAM(S): 50 CAPSULE, EXTENDED RELEASE ORAL at 09:20

## 2020-01-18 RX ADMIN — MORPHINE SULFATE 2 MILLIGRAM(S): 50 CAPSULE, EXTENDED RELEASE ORAL at 22:50

## 2020-01-18 RX ADMIN — CHLORHEXIDINE GLUCONATE 1 APPLICATION(S): 213 SOLUTION TOPICAL at 06:22

## 2020-01-18 RX ADMIN — Medication 3 MILLILITER(S): at 08:39

## 2020-01-18 RX ADMIN — Medication 81 MILLIGRAM(S): at 10:31

## 2020-01-18 RX ADMIN — HEPARIN SODIUM 5000 UNIT(S): 5000 INJECTION INTRAVENOUS; SUBCUTANEOUS at 22:20

## 2020-01-18 RX ADMIN — PANTOPRAZOLE SODIUM 40 MILLIGRAM(S): 20 TABLET, DELAYED RELEASE ORAL at 06:25

## 2020-01-18 RX ADMIN — MORPHINE SULFATE 2 MILLIGRAM(S): 50 CAPSULE, EXTENDED RELEASE ORAL at 18:46

## 2020-01-18 RX ADMIN — Medication 1 APPLICATION(S): at 06:24

## 2020-01-18 RX ADMIN — Medication 4: at 06:25

## 2020-01-18 RX ADMIN — PANTOPRAZOLE SODIUM 40 MILLIGRAM(S): 20 TABLET, DELAYED RELEASE ORAL at 16:20

## 2020-01-18 RX ADMIN — HEPARIN SODIUM 5000 UNIT(S): 5000 INJECTION INTRAVENOUS; SUBCUTANEOUS at 16:20

## 2020-01-18 RX ADMIN — CHLORHEXIDINE GLUCONATE 1 APPLICATION(S): 213 SOLUTION TOPICAL at 06:23

## 2020-01-18 RX ADMIN — MORPHINE SULFATE 2 MILLIGRAM(S): 50 CAPSULE, EXTENDED RELEASE ORAL at 22:20

## 2020-01-18 RX ADMIN — ERYTHROPOIETIN 10000 UNIT(S): 10000 INJECTION, SOLUTION INTRAVENOUS; SUBCUTANEOUS at 11:56

## 2020-01-18 RX ADMIN — MORPHINE SULFATE 2 MILLIGRAM(S): 50 CAPSULE, EXTENDED RELEASE ORAL at 14:59

## 2020-01-18 RX ADMIN — MORPHINE SULFATE 2 MILLIGRAM(S): 50 CAPSULE, EXTENDED RELEASE ORAL at 09:48

## 2020-01-18 RX ADMIN — LACTULOSE 10 GRAM(S): 10 SOLUTION ORAL at 06:25

## 2020-01-18 RX ADMIN — HEPARIN SODIUM 5000 UNIT(S): 5000 INJECTION INTRAVENOUS; SUBCUTANEOUS at 06:24

## 2020-01-18 RX ADMIN — MORPHINE SULFATE 2 MILLIGRAM(S): 50 CAPSULE, EXTENDED RELEASE ORAL at 18:27

## 2020-01-18 NOTE — PROGRESS NOTE ADULT - SUBJECTIVE AND OBJECTIVE BOX
CC: Acute hypoxemic respiratory failure, GIB (17 Jan 2020 14:18)    HPI:  65 y/o F with a h/o dCHF, moderate pHTN, CAD, CKD, HTN, DM, cirrhosis, portal gastropathy, recurrent GIB, blind, large sacral wound, presents to the ED from NH with AMS, hypoxemia, and hypotension. Patient had large melenotic BM in the ED. H&H: 5.8/17. Lactate: 2.7. KEITH on CKD. Hyperkalemic. Volume resuscitation began with 3u PRBC. Patient minimally responsive and moaning in pain. UA(+) with thick tan urine in fontenot. Developed progressive respiratory distress and hypoxemia requiring NIPPV. (26 Dec 2019 03:58)    INTERVAL HPI/OVERNIGHT EVENTS:    Vital Signs Last 24 Hrs  T(C): 36.5 (18 Jan 2020 08:35), Max: 37.3 (17 Jan 2020 20:00)  T(F): 97.7 (18 Jan 2020 08:35), Max: 99.1 (17 Jan 2020 20:00)  HR: 89 (18 Jan 2020 10:30) (83 - 103)  BP: 103/65 (18 Jan 2020 10:30) (94/61 - 119/68)  BP(mean): 68 (17 Jan 2020 19:45) (68 - 73)  RR: 17 (18 Jan 2020 08:35) (17 - 27)  SpO2: 98% (18 Jan 2020 10:30) (94% - 100%)    PHYSICAL EXAM:  General: mild distress due to pain  Eyes: opacified right eye, left eye pupil reactive and EOMI  Neck: obese  Respiratory: bilateral rhonchi/rales  Cardiovascular: Regular rate and rhythm. S1 and S2 Normal; loud systolic murmur  Gastrointestinal: Soft distended; tender in the L quadrants.  Extremities: Normal range of motion, No clubbing, cyanosis or edema; deep wound on the right side of sacrum atleast 2cm deep with what appears to be some wide tunneling towards sacrum, pink tissue at the base with topical medications and some granulation tissue, no surrounding erythema or necrotic areas. Some areas small amount of bleeding likely where previously debrided  Neurological: Alert and oriented to person and place  Skin: Warm and dry. No acute rash  Psychiatric: Cooperative and appropriate; flat affect    I&O's Detail    17 Jan 2020 07:01  -  18 Jan 2020 07:00  --------------------------------------------------------  IN:    Enteral Tube Flush: 250 mL    Nepro: 600 mL  Total IN: 850 mL    OUT:  Total OUT: 0 mL    Total NET: 850 mL      18 Jan 2020 07:01  -  18 Jan 2020 11:32  --------------------------------------------------------  IN:    Nepro: 180 mL  Total IN: 180 mL    OUT:  Total OUT: 0 mL    Total NET: 180 mL                        9.7    12.78 )-----------( 313      ( 16 Jan 2020 12:41 )             28.4     16 Jan 2020 12:40    132    |  96     |  39.0   ----------------------------<  274    3.5     |  25.0   |  3.14     Ca    7.9        16 Jan 2020 12:40    TPro  7.5    /  Alb  2.1    /  TBili  0.6    /  DBili  x      /  AST  32     /  ALT  18     /  AlkPhos  209    16 Jan 2020 12:40      CAPILLARY BLOOD GLUCOSE      POCT Blood Glucose.: 193 mg/dL (18 Jan 2020 11:23)  POCT Blood Glucose.: 201 mg/dL (18 Jan 2020 06:21)  POCT Blood Glucose.: 95 mg/dL (18 Jan 2020 00:36)  POCT Blood Glucose.: 194 mg/dL (17 Jan 2020 17:13)  POCT Blood Glucose.: 237 mg/dL (17 Jan 2020 15:07)    LIVER FUNCTIONS - ( 16 Jan 2020 12:40 )  Alb: 2.1 g/dL / Pro: 7.5 g/dL / ALK PHOS: 209 U/L / ALT: 18 U/L / AST: 32 U/L / GGT: x           MEDICATIONS  (STANDING):  albuterol/ipratropium for Nebulization. 3 milliLiter(s) Nebulizer every 6 hours  aspirin  chewable 81 milliGRAM(s) Oral daily  chlorhexidine 2% Cloths 1 Application(s) Topical <User Schedule>  chlorhexidine 4% Liquid 1 Application(s) Topical <User Schedule>  collagenase Ointment 1 Application(s) Topical daily  dextrose 5%. 1000 milliLiter(s) (50 mL/Hr) IV Continuous <Continuous>  dextrose 50% Injectable 12.5 Gram(s) IV Push once  dextrose 50% Injectable 25 Gram(s) IV Push once  dextrose 50% Injectable 25 Gram(s) IV Push once  epoetin sara Injectable 58027 Unit(s) IV Push <User Schedule>  heparin  Injectable 5000 Unit(s) SubCutaneous every 8 hours  insulin lispro (HumaLOG) corrective regimen sliding scale   SubCutaneous every 6 hours  lactulose Syrup 10 Gram(s) Oral two times a day  pantoprazole  Injectable 40 milliGRAM(s) IV Push two times a day    MEDICATIONS  (PRN):  acetaminophen  Suppository .. 650 milliGRAM(s) Rectal every 6 hours PRN Temp greater or equal to 38C (100.4F), Mild Pain (1 - 3)  dextrose 40% Gel 15 Gram(s) Oral once PRN Blood Glucose LESS THAN 70 milliGRAM(s)/deciLiter  glucagon  Injectable 1 milliGRAM(s) IntraMuscular once PRN Glucose <70 milliGRAM(s)/deciLiter  morphine  - Injectable 2 milliGRAM(s) IV Push every 4 hours PRN Severe Pain (7 - 10)  sodium chloride 0.9% lock flush 10 milliLiter(s) IV Push every 1 hour PRN Pre/post blood products, medications, blood draw, and to maintain line patency      RADIOLOGY & ADDITIONAL TESTS: CC: Acute hypoxemic respiratory failure, GIB (17 Jan 2020 14:18)    INTERVAL HPI/OVERNIGHT EVENTS:  no acute events  patient without complaints  no change in vitals overnight  rolled patient to exam the sacral wound today patient in no significant discomfort    Vital Signs Last 24 Hrs  T(C): 36.5 (18 Jan 2020 08:35), Max: 37.3 (17 Jan 2020 20:00)  T(F): 97.7 (18 Jan 2020 08:35), Max: 99.1 (17 Jan 2020 20:00)  HR: 89 (18 Jan 2020 10:30) (83 - 103)  BP: 103/65 (18 Jan 2020 10:30) (94/61 - 119/68)  BP(mean): 68 (17 Jan 2020 19:45) (68 - 73)  RR: 17 (18 Jan 2020 08:35) (17 - 27)  SpO2: 98% (18 Jan 2020 10:30) (94% - 100%)    PHYSICAL EXAM:  General: mild distress due to pain  Eyes: opacified right eye, left eye pupil reactive and EOMI  Neck: obese  Respiratory: bilateral rhonchi/rales  Cardiovascular: Regular rate and rhythm. S1 and S2 Normal; loud systolic murmur  Gastrointestinal: Soft distended; tender in the L quadrants.  Extremities: Normal range of motion, No clubbing, cyanosis or edema; deep wound on the right side of sacrum atleast 2cm deep with what appears to be some wide tunneling towards sacrum, pink tissue at the base with topical medications and some granulation tissue, no surrounding erythema or necrotic areas. Some areas small amount of bleeding likely where previously debrided  Neurological: Alert and oriented to person and place  Skin: Warm and dry. No acute rash  Psychiatric: Cooperative and appropriate; flat affect    I&O's Detail    17 Jan 2020 07:01  -  18 Jan 2020 07:00  --------------------------------------------------------  IN:    Enteral Tube Flush: 250 mL    Nepro: 600 mL  Total IN: 850 mL    OUT:  Total OUT: 0 mL    Total NET: 850 mL      18 Jan 2020 07:01  -  18 Jan 2020 11:32  --------------------------------------------------------  IN:    Nepro: 180 mL  Total IN: 180 mL    OUT:  Total OUT: 0 mL    Total NET: 180 mL                        9.7    12.78 )-----------( 313      ( 16 Jan 2020 12:41 )             28.4     16 Jan 2020 12:40    132    |  96     |  39.0   ----------------------------<  274    3.5     |  25.0   |  3.14     Ca    7.9        16 Jan 2020 12:40    TPro  7.5    /  Alb  2.1    /  TBili  0.6    /  DBili  x      /  AST  32     /  ALT  18     /  AlkPhos  209    16 Jan 2020 12:40      CAPILLARY BLOOD GLUCOSE      POCT Blood Glucose.: 193 mg/dL (18 Jan 2020 11:23)  POCT Blood Glucose.: 201 mg/dL (18 Jan 2020 06:21)  POCT Blood Glucose.: 95 mg/dL (18 Jan 2020 00:36)  POCT Blood Glucose.: 194 mg/dL (17 Jan 2020 17:13)  POCT Blood Glucose.: 237 mg/dL (17 Jan 2020 15:07)    LIVER FUNCTIONS - ( 16 Jan 2020 12:40 )  Alb: 2.1 g/dL / Pro: 7.5 g/dL / ALK PHOS: 209 U/L / ALT: 18 U/L / AST: 32 U/L / GGT: x           MEDICATIONS  (STANDING):  albuterol/ipratropium for Nebulization. 3 milliLiter(s) Nebulizer every 6 hours  aspirin  chewable 81 milliGRAM(s) Oral daily  chlorhexidine 2% Cloths 1 Application(s) Topical <User Schedule>  chlorhexidine 4% Liquid 1 Application(s) Topical <User Schedule>  collagenase Ointment 1 Application(s) Topical daily  dextrose 5%. 1000 milliLiter(s) (50 mL/Hr) IV Continuous <Continuous>  dextrose 50% Injectable 12.5 Gram(s) IV Push once  dextrose 50% Injectable 25 Gram(s) IV Push once  dextrose 50% Injectable 25 Gram(s) IV Push once  epoetin sara Injectable 49476 Unit(s) IV Push <User Schedule>  heparin  Injectable 5000 Unit(s) SubCutaneous every 8 hours  insulin lispro (HumaLOG) corrective regimen sliding scale   SubCutaneous every 6 hours  lactulose Syrup 10 Gram(s) Oral two times a day  pantoprazole  Injectable 40 milliGRAM(s) IV Push two times a day    MEDICATIONS  (PRN):  acetaminophen  Suppository .. 650 milliGRAM(s) Rectal every 6 hours PRN Temp greater or equal to 38C (100.4F), Mild Pain (1 - 3)  dextrose 40% Gel 15 Gram(s) Oral once PRN Blood Glucose LESS THAN 70 milliGRAM(s)/deciLiter  glucagon  Injectable 1 milliGRAM(s) IntraMuscular once PRN Glucose <70 milliGRAM(s)/deciLiter  morphine  - Injectable 2 milliGRAM(s) IV Push every 4 hours PRN Severe Pain (7 - 10)  sodium chloride 0.9% lock flush 10 milliLiter(s) IV Push every 1 hour PRN Pre/post blood products, medications, blood draw, and to maintain line patency      RADIOLOGY & ADDITIONAL TESTS:

## 2020-01-18 NOTE — PROGRESS NOTE ADULT - ASSESSMENT
1) KEITH on CKD- now deemed ESRD  2) encephalopathy- improved  4) Acute blood loss anemia superimposed on anemia of CKD  5) Hypotension- BP now improved    HD TTS schedule  Needs AVF, outpt HD set up  Continue QUENTIN , PRBC transfusion prn with HD  Monitor lytes, BP and UOP    Will follow

## 2020-01-18 NOTE — PROGRESS NOTE ADULT - SUBJECTIVE AND OBJECTIVE BOX
[facility] DIVISION OF KIDNEY DISEASES AND HYPERTENSION -- HEMODIALYSIS NOTE  --------------------------------------------------------------------------------  Chief Complaint: ESRD/Ongoing hemodialysis requirement    24 hour events/subjective:  Pt seen during HD, BP stable  Pt tolerating procedure well    PAST HISTORY  --------------------------------------------------------------------------------  No significant changes to PMH, PSH, FHx, SHx, unless otherwise noted    ALLERGIES & MEDICATIONS  --------------------------------------------------------------------------------  Allergies    ACE inhibitors (Other (Mild))    Intolerances    oxycodone (Sedation/Somnol)    Standing Inpatient Medications  albuterol/ipratropium for Nebulization. 3 milliLiter(s) Nebulizer every 6 hours  aspirin  chewable 81 milliGRAM(s) Oral daily  chlorhexidine 2% Cloths 1 Application(s) Topical <User Schedule>  chlorhexidine 4% Liquid 1 Application(s) Topical <User Schedule>  collagenase Ointment 1 Application(s) Topical daily  dextrose 5%. 1000 milliLiter(s) IV Continuous <Continuous>  dextrose 50% Injectable 12.5 Gram(s) IV Push once  dextrose 50% Injectable 25 Gram(s) IV Push once  dextrose 50% Injectable 25 Gram(s) IV Push once  epoetin sara Injectable 20123 Unit(s) IV Push <User Schedule>  heparin  Injectable 5000 Unit(s) SubCutaneous every 8 hours  insulin lispro (HumaLOG) corrective regimen sliding scale   SubCutaneous every 6 hours  lactulose Syrup 10 Gram(s) Oral two times a day  pantoprazole  Injectable 40 milliGRAM(s) IV Push two times a day    PRN Inpatient Medications  acetaminophen  Suppository .. 650 milliGRAM(s) Rectal every 6 hours PRN  dextrose 40% Gel 15 Gram(s) Oral once PRN  glucagon  Injectable 1 milliGRAM(s) IntraMuscular once PRN  morphine  - Injectable 2 milliGRAM(s) IV Push every 4 hours PRN  sodium chloride 0.9% lock flush 10 milliLiter(s) IV Push every 1 hour PRN      REVIEW OF SYSTEMS  --------------------------------------------------------------------------------  Gen: No weight changes, fatigue, fevers/chills, weakness  Skin: No rashes  Head/Eyes/Ears/Mouth: No headache  Respiratory: No dyspnea, cough,  CV: No chest pain, orthopnea  GI: No abdominal pain, diarrhea, constipation, nausea, vomiting,  MSK: No joint pain  Neuro: No dizziness/lightheadedness, weakness  Heme: No bleeding  Psych: No significant nervousness, anxiety, stress, depression    All other systems were reviewed and are negative, except as noted.    VITALS/PHYSICAL EXAM  --------------------------------------------------------------------------------  T(C): 36.7 (01-18-20 @ 11:38), Max: 37.3 (01-17-20 @ 20:00)  HR: 83 (01-18-20 @ 11:38) (83 - 103)  BP: 114/58 (01-18-20 @ 11:38) (94/61 - 119/68)  RR: 18 (01-18-20 @ 11:38) (17 - 27)  SpO2: 100% (01-18-20 @ 11:38) (94% - 100%)  Wt(kg): --        01-17-20 @ 07:01  -  01-18-20 @ 07:00  --------------------------------------------------------  IN: 850 mL / OUT: 0 mL / NET: 850 mL    01-18-20 @ 07:01  -  01-18-20 @ 15:35  --------------------------------------------------------  IN: 180 mL / OUT: 0 mL / NET: 180 mL      Physical Exam:  	Gen: NAD  	HEENT:  supple neck,  	Pulm: CTA B/L  	CV: RRR, S1S2; no rub  	Abd: +BS, soft, nontender  	UE: Warm, intact strength  	LE: Warm, no edema  	Neuro: No focal deficits  	Psych: Normal affect and mood  	Skin: Warm, without rashes  	Vascular access: RIJ tunneled HD catheter+    LABS/STUDIES  --------------------------------------------------------------------------------    Iron 25, TIBC 154, %sat 16      [01-02-20 @ 09:39]  Ferritin 668      [01-02-20 @ 09:39]  PTH -- (Ca 8.0)      [01-02-20 @ 17:06]   119  PTH -- (Ca 9.1)      [05-30-19 @ 18:09]   107  Vitamin D (25OH) 25.2      [01-02-20 @ 17:06]  HbA1c 5.8      [12-26-19 @ 09:04]  TSH 1.10      [05-17-19 @ 12:21]    HBsAb 18.0      [12-26-19 @ 22:01]  HBsAg Nonreact      [12-26-19 @ 22:01]  HBcAb Nonreact      [12-26-19 @ 22:01]  HCV 0.20, Nonreact      [12-26-19 @ 22:01]

## 2020-01-18 NOTE — PROGRESS NOTE ADULT - ASSESSMENT
Pediatric Endocrinology Follow-up Consultation    Patient: Geo Hicks MRN# [id number]   YOB: 1998 Age: 18 year 10 month old   Date of Visit: Sep 20, 2017    Dear Dr. Jeffrey Espinoza:    I had the pleasure of seeing your patient, Geo Hicks in the Pediatric Endocrinology Clinic, [facility], on Sep 20, 2017 for a follow-up consultation of hypernatremia, adrenal insufficiency and chronic steroid therapy in the setting of ependymoma s/p chemotherapy and radiation therapy.          Problem list:     Patient Active Problem List    Diagnosis Date Noted     Body temperature low 09/20/2017     Priority: Medium     Current chronic use of systemic steroids 09/20/2017     Priority: Medium     Hypernatremia 03/15/2017     Priority: Medium     Health Care Home 12/26/2016     Priority: Medium     CANDELARIO (obstructive sleep apnea) 10/06/2016     Priority: Medium     Noncomitant strabismus 02/10/2016     Priority: Medium     Abducens neuropathy of both eyes 02/10/2016     Priority: Medium     S/P craniotomy 01/15/2016     Priority: Medium     S/P biopsy 01/15/2016     Priority: Medium     Post-operative state 01/14/2016     Priority: Medium     Ependymoma (H) 06/26/2015     Priority: Medium     Admission for antineoplastic chemotherapy 06/26/2015     Priority: Medium     Hemorrhagic stroke (H) 06/04/2015     Priority: Medium     Intracranial hemorrhage (H) 05/26/2015     Priority: Medium     Posterior fossa tumor (H) 04/12/2015     Priority: Medium     Dyspepsia 04/15/2014     Priority: Medium     Elevated serum creatinine 03/19/2014     Priority: Medium     Closed fracture at the growth plate of right distal fibula  02/27/2014     Priority: Medium     GERD (gastroesophageal reflux disease) 04/03/2009     Priority: Medium            HPI:   Geo Hicks is a 18 year 10 month old  male with a 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 Geo  presented in early April 2015 with 2 weeks of headaches, decreased coordination, and gait instability. Imaging showed a posterior fossa mass for which he underwent suboccipital craniotomy for partial resection of the tumor. The pathology was consistent with an ependymoma.       Geo has been taking steroids since being diagnosed in April 2015. They have been tapering down the dose over time. We had previously begun to taper the decadron and convert to hydrocortisone, but he had another surgery and went back on decadron.       Geo is participating in a Phase I drug trial: [id number] with Entinostat. He receives the study drug weekly.     INTERIM HISTORY: Since the last visit on 3/15/17, Geo had low temperature readings. He has had nerve loss and cold feelings on his right side. Geo hasn't noticed any other problems.     His left knee has been hurting a lot today. He has tenderness on the right upper part of the knee at the site of a bruise. Today, he was flipped around on his bed where his head was at the foot of the bed. This is a first time occurrence. Dad wonders if Geo might have hit his knee during his overactive sleep.     Geo takes omeprazole for heartburn and as a preventative due to chronic steroid therapy.     Geo does regular sit-up exercises in his room.     History was obtained from patient and patient's father.          Social History:     Social history was reviewed and is unchanged. Refer to the initial note.         Family History:     Family History   Problem Relation Age of Onset     Circulatory Father      PE/DVT     Hypothyroidism Father 30     DIABETES Maternal Grandmother      DIABETES Paternal Grandmother      DIABETES Paternal Grandfather      C.A.D. Paternal Grandfather      Hypertension Maternal Grandfather      Thyroid Disease Paternal Aunt      unknown whether hypo or hyper       Family history was reviewed and is unchanged. Refer to the initial note.         Allergies:      Allergies   Allergen Reactions     Blood Transfusion Related (Informational Only) Swelling     Periorbital swelling post platelet transfusion     No Known Drug Allergies              Medications:     Current Outpatient Prescriptions   Medication Sig Dispense Refill     mupirocin (BACTROBAN) 2 % ointment Use 2 times a day to the buttock with flare 22 g 3     fluticasone (FLONASE) 50 MCG/ACT spray Spray 1-2 sprays into both nostrils daily 1 Bottle 11     Clindamycin Phos-Benzoyl Perox 1.2-3.75 % GEL Externally apply 1 Application topically nightly as needed 50 g 3     dexamethasone (DECADRON) 0.5 MG tablet TAKE 1.5 TABLETS (0.75 MG) BY MOUTH DAILY (WITH BREAKFAST)  3     pentoxifylline (TRENTAL) 400 MG CR tablet Take 1 tablet (400 mg) by mouth 3 times daily (with meals) 270 tablet 2     sulfamethoxazole-trimethoprim (BACTRIM/SEPTRA) 400-80 MG per tablet Take 1 tablet by mouth 2 times daily On Saturdays and Sundays 24 tablet 11     ketoconazole (NIZORAL) 2 % shampoo Use a few times per week on the scalp as shampoo 120 mL 3     omeprazole (PRILOSEC) 20 MG CR capsule Take 1 capsule (20 mg) by mouth daily 90 capsule 2     potassium phosphate, monobasic, (K-PHOS) 500 MG tablet Take 1 tablet (500 mg) by mouth 3 times daily 90 tablet 3     calcium carbonate-vitamin D 600-400 MG-UNIT CHEW Take 2 tablets in the morning and 1 tablet in the evening. 90 tablet 3     vitamin E (GNP VITAMIN E) 400 UNIT capsule Take 1 capsule (400 Units) by mouth daily 30 capsule 11     sodium chloride (OCEAN NASAL SPRAY) 0.65 % nasal spray Spray 2 sprays into both nostrils 4 times daily 1 Bottle 1     dexamethasone (DECADRON) 1 MG tablet Reported on 3/31/2017 150 tablet 3     docusate sodium (COLACE) 100 MG tablet Take 100 mg by mouth 2 times daily as needed for constipation 60 tablet 1     Cholecalciferol 400 UNITS CHEW Take 1 tablet (400 Units) by mouth every morning 60 tablet 2     Glycerin, Laxative, (GLYCERIN, ADULT,) 2.1 G SUPP Place 1  63 yo female with hx of CHFpEF, CAD, CKD, HTN, DM, cirrhosis, portal gastropathy, recurrent GI bleed, blind, sacral decub ulcer, presented to the ED from nursing home with encephalopathy, hypoxic resp failure requiring bipap, in ED found to have anemia with Hb 5 that responded to blood transfusions (hx of recurrent GI bleed for which multiple EGDs/colonoscopies have been performed, no further invasive work up as per GI). Also noted to have sepsis on admission that was secondary to PNA, possibly gram negative bacteria. Patient completed a course of zosyn. Also with KEITH on CKD now requiring HD via left IJ. Right chest tunnelled cath placed for access on 1/3/20. Patient failed s/s and is NPO. NGT feeding was started and family to make now opting for PEG. Had upper ext mapping US showed left brachial veins DVT around the catheter and superficial right basilic and left cephalic vein thrombosis which vascular recommends full anticoagulation if not contraindicated due to GI bleed on admission; discussed with GI to hold on full dose anticoagulation for now.    Patient was awaiting placement in NH/dialysis center. Patient on 1/16 had RRT for hypoxia; likely aspiration event. She was transferred to [address] [place] [address] as they did not feel she was stable for HD in the dialysis center. She received midodrine prior to dialysis. Planned to given albumin with lasix however this was deferred as her vitals improved. She received dialysis yesterday on [address] [place] [address].    Patient was seen the following day on 1/17 on [address] [place] [address] she was tolerating NC at 100% FiO2 and was hemodynamically stable. MS the same as previous days. Patient downgraded to regular bed with     #AHRF - secondary to aspiration event  - CXR with known infiltrate in the right lung  - likely aspiration  - continue to titrate O2    #back pain  - may be musculoskeletal from bed  - had UA and CT abdomen done to evaluate  - at this time pain controlled    #Oropharyngeal dysphagia   - failed MBS abd swallow evaluation  - s/p PEG , tolerating feeding   - at goal of 60cc/hour    #Unstageable Sacral Decub - no signs of infection at this time  - s/p debridement bedside x 2 by surgery  - no debridement planned at present - for now will continue with current dressing changes; not a candidate for wound vac at this time per surgery  - cont daily wound care repositioning  - should place primafit - discussed with nursing  - continue with current wound care    #Right pneumonia - suspected  aspiration  - treated initially abx restarted  for worsening infiltrate / CXR  - short course treatment course completed  - did not reinitiate for hypoxic event on 1/16 as that was likely aspiration  - continue to trend fever for now    #bilateral superficial vein thrombosis on US doppler of upper ext and DVT on left brachial  vein around the catheter  - vascular rec anticoagulation due to GI bleed will hold off;  - poor candidate for full anticoagulation due to GI bleed on this admission and again in the past  - cont aspirin for now    #Acute on chronic diastolic CHF/ Acute hypoxic respiratory failure- improving   - cont HD for fluid removal   - No longer requiring bipap  - on NC comfortable     #Acute blood loss Anemia  - Hx of recurrent GI bleed? prior EGDs/ colonoscopies this year which were negative, prior CTA with +transverse colon bleed (Jan of 2019)  - Seen by GI on this admission no further work up   - Responded to blood transfusions   - repeat blood transfusion required for drop in hgb  - cont Protonix daily     #s/p Metabolic vs Uremic vs Hepatic Encephalopathy  - stable    #CASTAÑEDA Cirrhosis with portal gastropathy and AVMs  - change lactulose to standing through PEG with holding parameters  - c/w rifaximin  - cont PPI  - ammonia level 45 this AM    #KEITH/ATN on CKD- started on new HD   - cont HD per nephrology   - nephrology follow up appreciated  - HD TTS schedule; next HD tomorrow  - Needs AVF, outpt HD set up  - Continue QUENTIN , PRBC transfusion prn with HD  - Monitor lytes, BP and UOP  - need dialysis seat in the community    #DM2- cont sliding scale insulin coverage   - Well controlled  - Cont sliding scale insulin coverage     [facility] to [facility]/NH; palliative care evaluation prior however at this time family not for DNR/DNI and want all possible treatment options.  Pt needs AVF and HD seat. Vital signs stable, downgrade to medical floor. suppository rectally daily as needed 25 suppository 0     polyethylene glycol (MIRALAX/GLYCOLAX) packet Take 17 g by mouth daily as needed for constipation               Review of Systems:   Gen: Negative  Eye: Geo has double vision and wears an eye patch on alternating eyes to help him focus. No recent changes.   ENT: No hearing changes. Has increased salivation.   Pulmonary: He has a double-clutch in his breathing, noticed by his father. He has had this recognized previously and they recommended that he focus on breathing with his diaphragm.   Cardio: Negative, no dizziness or fainting.   Gastrointestinal: Negative, no constipation, nausea, stomach aches or diarrhea.   Hematologic: Bruises easily. No bleeding concerns.   Genitourinary: Negative  Musculoskeletal: Left knee pain today.   Psychiatric: Negative  Neurologic: Negative  Skin: He has significant striae from chronic steroid use. He had some recent dog scratches which have healed well.   Endocrine: see HPI.             Physical Exam:   Blood pressure 113/78, pulse 88, temperature 96.8  F (36  C), temperature source Axillary, resp. rate 18, weight 166 lb 14.2 oz (75.7 kg), SpO2 100 %.     Weight: 75.7 kg (actual weight), 76 %ile (Z= 0.71) based on CDC 2-20 Years weight-for-age data using vitals from 9/20/2017.  BMI: Body mass index is 23.92 kg/(m^2). 74 %ile (Z= 0.64) based on CDC 2-20 Years BMI-for-age data using weight from 9/20/2017 and height from 9/6/2017.      GENERAL:  He is alert and in no apparent distress.   HEENT:  Head is  normocephalic and atraumatic.  Pupils equal, round and reactive to light and accommodation.  Extraocular movements: he has alternating esotropia. Funduscopic exam shows crisp disc margins and normal venous pulsations.  Nares are clear.  Oropharynx shows normal dentition uvula and palate with excess secretions.  Tympanic membranes visualized and clear. Wax buildup in his right ear.   NECK:  Supple.  Thyroid was palpable,  smooth with no nodules.    LUNGS:  Clear to auscultation bilaterally.   CARDIOVASCULAR:  Regular rate and rhythm without murmur, gallop or rub.   ABDOMEN:  Nondistended.  Positive bowel sounds, soft and nontender.  No hepatosplenomegaly or masses palpable.   GENITOURINARY EXAM:  Deferred.   MUSCULOSKELETAL: Wheelchair bound. Upper body muscle tone is normal. Left knee shows no evidence of swelling, no tenderness along the joint line.   NEUROLOGIC: Dysarthric speech. Abnormal eye movements.   SKIN:  Extensive striae. Bruising present on upper left knee.         Laboratory results:     Component      Latest Ref Rng & Units 2/24/2017   TSH      0.40 - 4.00 mU/L 1.29   T4 Free      0.76 - 1.46 ng/dL 1.10   Triiodothyronine (T3)      60 - 181 ng/dL 115     Results for orders placed or performed in visit on 09/20/17   T3 total   Result Value Ref Range    Triiodothyronine (T3) 125 60 - 181 ng/dL   CBC with platelets differential   Result Value Ref Range    WBC 5.6 4.0 - 11.0 10e9/L    RBC Count 4.05 3.7 - 5.3 10e12/L    Hemoglobin 13.7 11.7 - 15.7 g/dL    Hematocrit 39.9 35.0 - 47.0 %    MCV 99 77 - 100 fl    MCH 33.8 (H) 26.5 - 33.0 pg    MCHC 34.3 31.5 - 36.5 g/dL    RDW 11.7 10.0 - 15.0 %    Platelet Count 71 (L) 150 - 450 10e9/L    Diff Method Automated Method     % Neutrophils 69.7 %    % Lymphocytes 11.3 %    % Monocytes 13.1 %    % Eosinophils 5.0 %    % Basophils 0.5 %    % Immature Granulocytes 0.4 %    Nucleated RBCs 0 0 /100    Absolute Neutrophil 3.9 1.3 - 7.0 10e9/L    Absolute Lymphocytes 0.6 (L) 1.0 - 5.8 10e9/L    Absolute Monocytes 0.7 0.0 - 1.3 10e9/L    Absolute Eosinophils 0.3 0.0 - 0.7 10e9/L    Absolute Basophils 0.0 0.0 - 0.2 10e9/L    Abs Immature Granulocytes 0.0 0 - 0.4 10e9/L    Absolute Nucleated RBC 0.0    Comprehensive metabolic panel   Result Value Ref Range    Sodium 138 133 - 144 mmol/L    Potassium 3.9 3.4 - 5.3 mmol/L    Chloride 102 98 - 110 mmol/L    Carbon Dioxide 30 20 - 32 mmol/L     Anion Gap 6 3 - 14 mmol/L    Glucose 85 70 - 99 mg/dL    Urea Nitrogen 12 7 - 21 mg/dL    Creatinine 0.81 0.50 - 1.00 mg/dL    GFR Estimate >90 >60 mL/min/1.7m2    GFR Estimate If Black >90 >60 mL/min/1.7m2    Calcium 8.9 (L) 9.1 - 10.3 mg/dL    Bilirubin Total 0.3 0.2 - 1.3 mg/dL    Albumin 3.1 (L) 3.4 - 5.0 g/dL    Protein Total 6.2 (L) 6.8 - 8.8 g/dL    Alkaline Phosphatase 105 65 - 260 U/L    ALT 23 0 - 50 U/L    AST 20 0 - 35 U/L   Magnesium   Result Value Ref Range    Magnesium 2.0 1.6 - 2.3 mg/dL   Phosphorus   Result Value Ref Range    Phosphorus 3.1 2.8 - 4.6 mg/dL   T4 free   Result Value Ref Range    T4 Free 1.06 0.76 - 1.46 ng/dL   TSH   Result Value Ref Range    TSH 5.00 (H) 0.40 - 4.00 mU/L     *Note: Due to a large number of results and/or encounters for the requested time period, some results have not been displayed. A complete set of results can be found in Results Review.             Assessment and Plan:   1. Low body temperature.  2. Hypernatremia.   3. Ependymoma.    4. S/p chemotherapy.   5. S/p radiation therapy.   6. Chronic steroid therapy.  7. Left knee pain.     Because Geo has had chemotherapy and radiation that can damage the centers that control temperature in the body. Pevious monitoring of thyroid functions has shown normal values. I don't have a good explanation for his transient low body temperature, but will check thyroid functions today to rule out a central problem.     Geo has left knee pain with a bruise present suggesting injury. However, he is at high risk for avascular necrosis because of chronic steroid therapy. Therefore, if the pain persists I would recommend imaging.    He has not required stress dosing recently. Adrenal insufficiency is a life-threatening condition.  Stress-steroid dosing is necessary during illness, injury and surgical procedures to prevent hypotension, hypoglycemia and shock that, if not recognized, can lead to significant illness and possible  death.     MD Instructions:  We will evaluate for thyroid hormone problems.  Depending upon results, further testing or treatment may be necessary.     Today, we will obtain the following labs.     Orders Placed This Encounter   Procedures     T4 free     T3 total     TSH     Basic metabolic panel     RTC for follow up evaluation in 9-12 months.     RESULTS INTERPRETATION: Electrolytes are normal. The TSH is mildly elevated with normal free T4 and total T3.    Based upon these test results, Geo could be developing hypothyroidism, either primary due to damage to the thyroid or secondary due to TSH deficiency from radiation therapy.     I would like to have Geo undergo diurnal TSH testing to screen for central TSH deficiency. Individuals who have TSH deficiency have absence of the diurnal variation of TSH and a resulting absence of the peak TSH that provides a significant portion of thyroxine throughout the day.  To evaluate this, I recommend that Geo have a TSH performed between 7 and 9 a.m. and between 3 and 5 p.m. on the same day.  If there is less than a 50% fall from the morning to the afternoon, I would consider thyroid replacement with a target free thyroxine above 1.2.  These tests can be performed locally and faxed to my attention at [phone]     This document serves as a record of the services and decisions personally performed and made by Dequan Martin MD, PhD. It was created on his behalf by Radu Cortes, a trained medical scribe. The creation of this document is based on the provider's statements to the medical scribe.    Thank you for allowing me to participate in the care of your patient.  Please do not hesitate to call with questions or concerns.    Sincerely,    I personally performed the entire clinical encounter documented in this note.    Dequan Martin MD, PhD    Pediatric Endocrinology  [facility]  Hospital  Phone: [phone]  Fax:   [phone]     Total time 26 minutes, time of counseling and coordination of care >50%.    CC  Patient Care Team:  Jeffrey Espinoza MD as PCP - General (Family Practice)  Dequan Timmons MD as MD (Surgery)  Leoncio Rousseau MD as MD (Pediatric Hematology/Oncology)  Kristi Schuler, APRN CNP as Nurse Practitioner (Nurse Practitioner - Pediatrics)  Higinio Walters MD (Ophthalmology)  Karina Hodgson MSW as   Eren Reeder MD as MD (Dermatology)  Schwab, Briana, RN as Nurse Coordinator  Perico Holley MD as MD (Pediatric Neurology)     Parents of Geo Hicks  [address] [facility] [id number]

## 2020-01-19 LAB
GLUCOSE BLDC GLUCOMTR-MCNC: 172 MG/DL — HIGH (ref 70–99)
GLUCOSE BLDC GLUCOMTR-MCNC: 178 MG/DL — HIGH (ref 70–99)
GLUCOSE BLDC GLUCOMTR-MCNC: 179 MG/DL — HIGH (ref 70–99)
GLUCOSE BLDC GLUCOMTR-MCNC: 231 MG/DL — HIGH (ref 70–99)
GLUCOSE BLDC GLUCOMTR-MCNC: 233 MG/DL — HIGH (ref 70–99)

## 2020-01-19 PROCEDURE — 99233 SBSQ HOSP IP/OBS HIGH 50: CPT

## 2020-01-19 RX ORDER — SODIUM HYPOCHLORITE 0.125 %
1 SOLUTION, NON-ORAL MISCELLANEOUS DAILY
Refills: 0 | Status: DISCONTINUED | OUTPATIENT
Start: 2020-01-19 | End: 2020-01-23

## 2020-01-19 RX ORDER — COLLAGENASE CLOSTRIDIUM HIST. 250 UNIT/G
1 OINTMENT (GRAM) TOPICAL DAILY
Refills: 0 | Status: DISCONTINUED | OUTPATIENT
Start: 2020-01-19 | End: 2020-01-21

## 2020-01-19 RX ORDER — MORPHINE SULFATE 50 MG/1
1 CAPSULE, EXTENDED RELEASE ORAL ONCE
Refills: 0 | Status: DISCONTINUED | OUTPATIENT
Start: 2020-01-19 | End: 2020-01-19

## 2020-01-19 RX ADMIN — CHLORHEXIDINE GLUCONATE 1 APPLICATION(S): 213 SOLUTION TOPICAL at 05:13

## 2020-01-19 RX ADMIN — Medication 2: at 00:01

## 2020-01-19 RX ADMIN — MORPHINE SULFATE 2 MILLIGRAM(S): 50 CAPSULE, EXTENDED RELEASE ORAL at 20:31

## 2020-01-19 RX ADMIN — MORPHINE SULFATE 1 MILLIGRAM(S): 50 CAPSULE, EXTENDED RELEASE ORAL at 18:10

## 2020-01-19 RX ADMIN — Medication 1 APPLICATION(S): at 15:32

## 2020-01-19 RX ADMIN — Medication 2: at 23:14

## 2020-01-19 RX ADMIN — LACTULOSE 10 GRAM(S): 10 SOLUTION ORAL at 18:10

## 2020-01-19 RX ADMIN — Medication 1 APPLICATION(S): at 05:12

## 2020-01-19 RX ADMIN — PANTOPRAZOLE SODIUM 40 MILLIGRAM(S): 20 TABLET, DELAYED RELEASE ORAL at 05:13

## 2020-01-19 RX ADMIN — Medication 4: at 16:36

## 2020-01-19 RX ADMIN — MORPHINE SULFATE 1 MILLIGRAM(S): 50 CAPSULE, EXTENDED RELEASE ORAL at 17:21

## 2020-01-19 RX ADMIN — Medication 1 APPLICATION(S): at 18:11

## 2020-01-19 RX ADMIN — Medication 3 MILLILITER(S): at 08:15

## 2020-01-19 RX ADMIN — Medication 3 MILLILITER(S): at 03:58

## 2020-01-19 RX ADMIN — MORPHINE SULFATE 2 MILLIGRAM(S): 50 CAPSULE, EXTENDED RELEASE ORAL at 01:40

## 2020-01-19 RX ADMIN — MORPHINE SULFATE 2 MILLIGRAM(S): 50 CAPSULE, EXTENDED RELEASE ORAL at 14:30

## 2020-01-19 RX ADMIN — MORPHINE SULFATE 2 MILLIGRAM(S): 50 CAPSULE, EXTENDED RELEASE ORAL at 02:10

## 2020-01-19 RX ADMIN — Medication 81 MILLIGRAM(S): at 11:10

## 2020-01-19 RX ADMIN — MORPHINE SULFATE 2 MILLIGRAM(S): 50 CAPSULE, EXTENDED RELEASE ORAL at 21:30

## 2020-01-19 RX ADMIN — Medication 3 MILLILITER(S): at 14:59

## 2020-01-19 RX ADMIN — MORPHINE SULFATE 2 MILLIGRAM(S): 50 CAPSULE, EXTENDED RELEASE ORAL at 15:03

## 2020-01-19 RX ADMIN — Medication 3 MILLILITER(S): at 20:29

## 2020-01-19 RX ADMIN — MORPHINE SULFATE 2 MILLIGRAM(S): 50 CAPSULE, EXTENDED RELEASE ORAL at 06:11

## 2020-01-19 RX ADMIN — HEPARIN SODIUM 5000 UNIT(S): 5000 INJECTION INTRAVENOUS; SUBCUTANEOUS at 05:12

## 2020-01-19 RX ADMIN — HEPARIN SODIUM 5000 UNIT(S): 5000 INJECTION INTRAVENOUS; SUBCUTANEOUS at 23:15

## 2020-01-19 RX ADMIN — Medication 4: at 05:56

## 2020-01-19 RX ADMIN — Medication 2: at 11:10

## 2020-01-19 RX ADMIN — HEPARIN SODIUM 5000 UNIT(S): 5000 INJECTION INTRAVENOUS; SUBCUTANEOUS at 14:29

## 2020-01-19 RX ADMIN — CHLORHEXIDINE GLUCONATE 1 APPLICATION(S): 213 SOLUTION TOPICAL at 05:11

## 2020-01-19 RX ADMIN — PANTOPRAZOLE SODIUM 40 MILLIGRAM(S): 20 TABLET, DELAYED RELEASE ORAL at 18:10

## 2020-01-19 RX ADMIN — LACTULOSE 10 GRAM(S): 10 SOLUTION ORAL at 05:12

## 2020-01-19 NOTE — PROGRESS NOTE ADULT - SUBJECTIVE AND OBJECTIVE BOX
[facility] DIVISION OF KIDNEY DISEASES AND HYPERTENSION -- HEMODIALYSIS NOTE  --------------------------------------------------------------------------------  Chief Complaint: ESRD/Ongoing hemodialysis requirement    24 hour events/subjective:  s/p HD yesterday with 2 L UF      PAST HISTORY  --------------------------------------------------------------------------------  No significant changes to PMH, PSH, FHx, SHx, unless otherwise noted    ALLERGIES & MEDICATIONS  --------------------------------------------------------------------------------  Allergies    ACE inhibitors (Other (Mild))    Intolerances    oxycodone (Sedation/Somnol)    Standing Inpatient Medications  albuterol/ipratropium for Nebulization. 3 milliLiter(s) Nebulizer every 6 hours  aspirin  chewable 81 milliGRAM(s) Oral daily  chlorhexidine 2% Cloths 1 Application(s) Topical <User Schedule>  chlorhexidine 4% Liquid 1 Application(s) Topical <User Schedule>  collagenase Ointment 1 Application(s) Topical daily  Dakins Solution - 1/2 Strength 1 Application(s) Topical daily  dextrose 5%. 1000 milliLiter(s) IV Continuous <Continuous>  dextrose 50% Injectable 12.5 Gram(s) IV Push once  dextrose 50% Injectable 25 Gram(s) IV Push once  dextrose 50% Injectable 25 Gram(s) IV Push once  epoetin sara Injectable 23796 Unit(s) IV Push <User Schedule>  heparin  Injectable 5000 Unit(s) SubCutaneous every 8 hours  insulin lispro (HumaLOG) corrective regimen sliding scale   SubCutaneous every 6 hours  lactulose Syrup 10 Gram(s) Oral two times a day  pantoprazole  Injectable 40 milliGRAM(s) IV Push two times a day    PRN Inpatient Medications  acetaminophen  Suppository .. 650 milliGRAM(s) Rectal every 6 hours PRN  dextrose 40% Gel 15 Gram(s) Oral once PRN  glucagon  Injectable 1 milliGRAM(s) IntraMuscular once PRN  morphine  - Injectable 2 milliGRAM(s) IV Push every 4 hours PRN  sodium chloride 0.9% lock flush 10 milliLiter(s) IV Push every 1 hour PRN      REVIEW OF SYSTEMS  --------------------------------------------------------------------------------  Gen: No weight changes, fatigue, fevers/chills, weakness  Skin: No rashes  Head/Eyes/Ears/Mouth: No headache  Respiratory: No dyspnea, cough,  CV: No chest pain, orthopnea  GI: No abdominal pain, diarrhea, constipation, nausea, vomiting,  MSK: No joint pain  Neuro: No dizziness/lightheadedness, weakness  Heme: No bleeding  Psych: No significant nervousness, anxiety, stress, depression    All other systems were reviewed and are negative, except as noted.    VITALS/PHYSICAL EXAM  --------------------------------------------------------------------------------  T(C): 36.8 (01-19-20 @ 08:30), Max: 36.8 (01-18-20 @ 18:24)  HR: 101 (01-19-20 @ 08:30) (60 - 101)  BP: 113/65 (01-19-20 @ 08:30) (88/50 - 132/73)  RR: 18 (01-19-20 @ 08:30) (18 - 18)  SpO2: 94% (01-19-20 @ 08:30) (94% - 100%)  Wt(kg): --        01-18-20 @ 07:01  -  01-19-20 @ 07:00  --------------------------------------------------------  IN: 900 mL / OUT: 2000 mL / NET: -1100 mL      Physical Exam:  	Gen: NAD, well-appearing  	HEENT: PERRL, supple neck,  	Pulm: CTA B/L  	CV: RRR, S1S2; no rub  	Abd: +BS, soft, nontender  	UE: Warm, intact strength; no asterixis  	LE: Warm, + edema  	Neuro: No focal deficits  	Psych: Normal affect and mood  	Skin: Warm, without rashes  	Vascular access:    LABS/STUDIES  --------------------------------------------------------------------------------      Iron 25, TIBC 154, %sat 16      [01-02-20 @ 09:39]  Ferritin 668      [01-02-20 @ 09:39]  PTH -- (Ca 8.0)      [01-02-20 @ 17:06]   119  PTH -- (Ca 9.1)      [05-30-19 @ 18:09]   107  Vitamin D (25OH) 25.2      [01-02-20 @ 17:06]  HbA1c 5.8      [12-26-19 @ 09:04]  TSH 1.10      [05-17-19 @ 12:21]    HBsAb 18.0      [12-26-19 @ 22:01]  HBsAg Nonreact      [12-26-19 @ 22:01]  HBcAb Nonreact      [12-26-19 @ 22:01]  HCV 0.20, Nonreact      [12-26-19 @ 22:01] [facility] DIVISION OF KIDNEY DISEASES AND HYPERTENSION -- HEMODIALYSIS NOTE  --------------------------------------------------------------------------------  Chief Complaint: ESRD/Ongoing hemodialysis requirement    24 hour events/subjective:  s/p HD yesterday with 2 L UF  Pt seen and examined, she has no new complaint    PAST HISTORY  --------------------------------------------------------------------------------  No significant changes to PMH, PSH, FHx, SHx, unless otherwise noted    ALLERGIES & MEDICATIONS  --------------------------------------------------------------------------------  Allergies    ACE inhibitors (Other (Mild))    Intolerances    oxycodone (Sedation/Somnol)    Standing Inpatient Medications  albuterol/ipratropium for Nebulization. 3 milliLiter(s) Nebulizer every 6 hours  aspirin  chewable 81 milliGRAM(s) Oral daily  chlorhexidine 2% Cloths 1 Application(s) Topical <User Schedule>  chlorhexidine 4% Liquid 1 Application(s) Topical <User Schedule>  collagenase Ointment 1 Application(s) Topical daily  Dakins Solution - 1/2 Strength 1 Application(s) Topical daily  dextrose 5%. 1000 milliLiter(s) IV Continuous <Continuous>  dextrose 50% Injectable 12.5 Gram(s) IV Push once  dextrose 50% Injectable 25 Gram(s) IV Push once  dextrose 50% Injectable 25 Gram(s) IV Push once  epoetin sara Injectable 45494 Unit(s) IV Push <User Schedule>  heparin  Injectable 5000 Unit(s) SubCutaneous every 8 hours  insulin lispro (HumaLOG) corrective regimen sliding scale   SubCutaneous every 6 hours  lactulose Syrup 10 Gram(s) Oral two times a day  pantoprazole  Injectable 40 milliGRAM(s) IV Push two times a day    PRN Inpatient Medications  acetaminophen  Suppository .. 650 milliGRAM(s) Rectal every 6 hours PRN  dextrose 40% Gel 15 Gram(s) Oral once PRN  glucagon  Injectable 1 milliGRAM(s) IntraMuscular once PRN  morphine  - Injectable 2 milliGRAM(s) IV Push every 4 hours PRN  sodium chloride 0.9% lock flush 10 milliLiter(s) IV Push every 1 hour PRN      REVIEW OF SYSTEMS  --------------------------------------------------------------------------------  Gen: No weight changes, fatigue, fevers/chills, weakness  Skin: No rashes  Head/Eyes/Ears/Mouth: No headache  Respiratory: No dyspnea, cough,  CV: No chest pain, orthopnea  GI: No abdominal pain, diarrhea, constipation, nausea, vomiting,  MSK: No joint pain  Neuro: No dizziness/lightheadedness, weakness  Heme: No bleeding  Psych: No significant nervousness, anxiety, stress, depression    All other systems were reviewed and are negative, except as noted.    VITALS/PHYSICAL EXAM  --------------------------------------------------------------------------------  T(C): 36.8 (01-19-20 @ 08:30), Max: 36.8 (01-18-20 @ 18:24)  HR: 101 (01-19-20 @ 08:30) (60 - 101)  BP: 113/65 (01-19-20 @ 08:30) (88/50 - 132/73)  RR: 18 (01-19-20 @ 08:30) (18 - 18)  SpO2: 94% (01-19-20 @ 08:30) (94% - 100%)  Wt(kg): --        01-18-20 @ 07:01  -  01-19-20 @ 07:00  --------------------------------------------------------  IN: 900 mL / OUT: 2000 mL / NET: -1100 mL      Physical Exam:  	Gen: NAD,  	HEENT: supple neck  	Pulm: CTA B/L  	CV: RRR, S1S2; no rub  	Abd: +BS, soft, nontender  	UE: Warm, intact strength; no asterixis  	LE: Warm, + edema  	Neuro: No focal deficits  	Psych: Normal affect and mood  	Skin: Warm, without rashes  	Vascular access: RIJ tunneled HD catheter+    LABS/STUDIES  --------------------------------------------------------------------------------      Iron 25, TIBC 154, %sat 16      [01-02-20 @ 09:39]  Ferritin 668      [01-02-20 @ 09:39]  PTH -- (Ca 8.0)      [01-02-20 @ 17:06]   119  PTH -- (Ca 9.1)      [05-30-19 @ 18:09]   107  Vitamin D (25OH) 25.2      [01-02-20 @ 17:06]  HbA1c 5.8      [12-26-19 @ 09:04]  TSH 1.10      [05-17-19 @ 12:21]    HBsAb 18.0      [12-26-19 @ 22:01]  HBsAg Nonreact      [12-26-19 @ 22:01]  HBcAb Nonreact      [12-26-19 @ 22:01]  HCV 0.20, Nonreact      [12-26-19 @ 22:01]

## 2020-01-19 NOTE — CONSULT NOTE ADULT - SUBJECTIVE AND OBJECTIVE BOX
64 year old female initially admitted to medicine on 19 for metabolic encephalopathy and sepsis likely secondary to pneumonia as well as melanotic stool on presentation. Patient is now undergoing HD via permacath and undergoing treatment for possible aspiration pneumonia. Currently receiving nutrition via PEG tube. General surgery consulted for unstageable sacral decubitus ulcer. Previously being followed by general surgery when patient was first admitted and underwent 2 bedside debridements with plans for operative intervention. However, family did not want patient to undergo general anesthesia at that time, so it was decided to continue to daily wound care.     PMH: CHF, pulm HTN, CAD, ESRD on HD, DM, cirrhosis, GERD, glaucoma, PVD, HLD, urinary retention, legally blind, h/o GI bleed  PSH : R CEA, , s/p CABG x1  Medications: as per med rec  Allergies: ACE inhibitors  FH: DM (parents)  SH: denies toxic habits x3      MEDICATIONS  (STANDING):  albuterol/ipratropium for Nebulization. 3 milliLiter(s) Nebulizer every 6 hours  aspirin  chewable 81 milliGRAM(s) Oral daily  chlorhexidine 2% Cloths 1 Application(s) Topical <User Schedule>  chlorhexidine 4% Liquid 1 Application(s) Topical <User Schedule>  collagenase Ointment 1 Application(s) Topical daily  Dakins Solution - 1/2 Strength 1 Application(s) Topical daily  dextrose 5%. 1000 milliLiter(s) (50 mL/Hr) IV Continuous <Continuous>  dextrose 50% Injectable 12.5 Gram(s) IV Push once  dextrose 50% Injectable 25 Gram(s) IV Push once  dextrose 50% Injectable 25 Gram(s) IV Push once  epoetin sara Injectable 21681 Unit(s) IV Push <User Schedule>  heparin  Injectable 5000 Unit(s) SubCutaneous every 8 hours  insulin lispro (HumaLOG) corrective regimen sliding scale   SubCutaneous every 6 hours  lactulose Syrup 10 Gram(s) Oral two times a day  pantoprazole  Injectable 40 milliGRAM(s) IV Push two times a day    MEDICATIONS  (PRN):  acetaminophen  Suppository .. 650 milliGRAM(s) Rectal every 6 hours PRN Temp greater or equal to 38C (100.4F), Mild Pain (1 - 3)  dextrose 40% Gel 15 Gram(s) Oral once PRN Blood Glucose LESS THAN 70 milliGRAM(s)/deciLiter  glucagon  Injectable 1 milliGRAM(s) IntraMuscular once PRN Glucose <70 milliGRAM(s)/deciLiter  morphine  - Injectable 2 milliGRAM(s) IV Push every 4 hours PRN Severe Pain (7 - 10)  sodium chloride 0.9% lock flush 10 milliLiter(s) IV Push every 1 hour PRN Pre/post blood products, medications, blood draw, and to maintain line patency      Vital Signs Last 24 Hrs  T(C): 36.6 (2020 16:55), Max: 36.8 (2020 18:24)  T(F): 97.9 (2020 16:55), Max: 98.3 (2020 18:24)  HR: 96 (2020 16:55) (60 - 101)  BP: 102/51 (2020 16:55) (88/50 - 113/65)  BP(mean): --  RR: 18 (2020 16:55) (18 - 18)  SpO2: 95% (2020 16:55) (94% - 100%)    Physical Exam:    general: no acute distress, AOx3  Respiratory: Breath Sounds equal & clear to auscultation, no accessory muscle use  Cardiovascular: Regular rate & rhythm, normal S1, S2; no murmurs, gallops or rubs  Gastrointestinal: Soft, non-tender, normal bowel sounds  Extremities: No peripheral edema, No cyanosis, clubbing     Vascular: Equal and normal pulses: 2+ peripheral pulses throughout    Musculoskeletal: No joint pain, swelling or deformity; no limitation of movement    Skin: large sacral ulcer ([id number]) with extensive fibrinous exudate and areas of overlaying eschar. unstageable at this time. no purulent drainage. no surrounding cellulitis to skin edges. depth beyond subcuteanous fat and infiltrating muscle/bone      I&O's Detail    2020 07:01  -  2020 07:00  --------------------------------------------------------  IN:    Nepro: 900 mL  Total IN: 900 mL    OUT:    Other: 2000 mL  Total OUT: 2000 mL    Total NET: -1100 mL          LABS:                RADIOLOGY & ADDITIONAL STUDIES:

## 2020-01-19 NOTE — CONSULT NOTE ADULT - PROBLEM SELECTOR RECOMMENDATION 9
-no acute surgical intervention at this time  -bedside debridement by surgical team  -daily dressing changes

## 2020-01-19 NOTE — CONSULT NOTE ADULT - ASSESSMENT
64 year old female previously from a nursing home with paraplegia with a large unstageable sacral decubitus ulcer requiring bedside debridement.

## 2020-01-19 NOTE — PROCEDURE NOTE - ADDITIONAL PROCEDURE DETAILS
Patient bed returned to low position and side rails up.
5FR  dual 20CM power midline ns flush good heme back left brachial vein
[id number] sacral decubitous ulcer debrided at bedside without complications. Wound dressed with wet to dry kerlex, and allevyn dressing
Surgery to perform either additional serial debridements or definitive operative debridement

## 2020-01-19 NOTE — PROGRESS NOTE ADULT - ASSESSMENT
65 yo female with hx of CHFpEF, CAD, CKD, HTN, DM, cirrhosis, portal gastropathy, recurrent GI bleed, blind, sacral decub ulcer, presented to the ED from nursing home with encephalopathy, hypoxic resp failure requiring bipap, in ED found to have anemia with Hb 5 that responded to blood transfusions (hx of recurrent GI bleed for which multiple EGDs/colonoscopies have been performed, no further invasive work up as per GI). Also noted to have sepsis on admission that was secondary to PNA, possibly gram negative bacteria. Patient completed a course of zosyn. Also with KEITH on CKD now requiring HD via left IJ. Right chest tunnelled cath placed for access on 1/3/20. Patient failed s/s and is NPO. NGT feeding was started and family to make now opting for PEG. Had upper ext mapping US showed left brachial veins DVT around the catheter and superficial right basilic and left cephalic vein thrombosis which vascular recommends full anticoagulation if not contraindicated due to GI bleed on admission; discussed with GI to hold on full dose anticoagulation for now.    Patient was awaiting placement in NH/dialysis center. Patient on 1/16 had RRT for hypoxia; likely aspiration event. She was transferred to [address] [place] [address] as they did not feel she was stable for HD in the dialysis center. She received midodrine prior to dialysis. Planned to given albumin with lasix however this was deferred as her vitals improved. She received dialysis yesterday on [address] [place] [address].    Patient was seen the following day on 1/17 on [address] [place] [address] she was tolerating NC at 100% FiO2 and was hemodynamically stable. MS the same as previous days. Patient downgraded to regular bed with     #Oropharyngeal dysphagia   - failed MBS abd swallow evaluation  - s/p PEG , tolerating feeding   - at goal of 60cc/hour    #Unstageable Sacral Decub - macerated and foul smelling   Contacted surgery for possible debridement.  Dress daily with dakin solution  Wound care consult   - no debridement planned at present - for now will continue with current dressing changes; not a candidate for wound vac at this time per surgery    #Right pneumonia - suspected  aspiration  - treated initially abx restarted  for worsening infiltrate / CXR  - short course treatment course completed    #bilateral superficial vein thrombosis on US doppler of upper ext and DVT on left brachial  vein around the catheter  - vascular rec anticoagulation due to GI bleed will hold off;  - poor candidate for full anticoagulation due to GI bleed on this admission and again in the past  - cont aspirin for now    #Acute on chronic diastolic CHF/ Acute hypoxic respiratory failure- improving   - cont HD for fluid removal   - Bipap as need.   -Supplemental oxygen via  NC comfortable     #Acute blood loss Anemia  - Hx of recurrent GI bleed? prior EGDs/ colonoscopies this year which were negative, prior CTA with +transverse colon bleed (Jan of 2019)  - Seen by GI on this admission no further work up   - repeat blood transfusion required for drop in hgb  - cont Protonix daily    #CASTAÑEDA Cirrhosis with portal gastropathy and AVMs  - change lactulose to standing through PEG with holding parameters  - c/w rifaximin  - cont PPI      #KEITH/ATN on CKD- started on new HD   - cont HD per nephrology   - nephrology follow up appreciated  - HD TTS schedule; next HD tomorrow  - Needs AVF, outpt HD set up  - Continue QUENTIN , PRBC transfusion prn with HD    #DM2- cont sliding scale insulin coverage   - Cont sliding scale insulin coverage     Eventual discharge to [facility]/NH; palliative care evaluation prior however at this time family not for DNR/DNI and want all possible treatment options.  Pt needs AVF and HD seat.

## 2020-01-19 NOTE — PROGRESS NOTE ADULT - SUBJECTIVE AND OBJECTIVE BOX
CC: ESRD on HD.  Lower ext paraparesis/Paraplegia.  Unstageable sacral decubital ulcers.   HPI:  65 y/o F with a h/o dCHF, moderate pHTN, CAD, CKD, HTN, DM, cirrhosis, portal gastropathy, recurrent GIB, blind, large sacral wound, presents to the ED from NH with AMS, hypoxemia, and hypotension. Patient had large melenotic BM in the ED. H&H: 5.8/17. Lactate: 2.7. KEITH on CKD. Hyperkalemic. Volume resuscitation began with 3u PRBC. Patient minimally responsive and moaning in pain. UA(+) with thick tan urine in fontenot. Developed progressive respiratory distress and hypoxemia requiring NIPPV. (26 Dec 2019 03:58)    REVIEW OF SYSTEMS:    Patient denied fever, chills, abdominal pain, nausea, vomiting, cough, shortness of breath, chest pain or palpitations    Vital Signs Last 24 Hrs  T(C): 36.8 (19 Jan 2020 08:30), Max: 36.8 (18 Jan 2020 18:24)  T(F): 98.3 (19 Jan 2020 08:30), Max: 98.3 (18 Jan 2020 18:24)  HR: 101 (19 Jan 2020 08:30) (60 - 101)  BP: 113/65 (19 Jan 2020 08:30) (88/50 - 132/73)  BP(mean): --  RR: 18 (19 Jan 2020 08:30) (18 - 18)  SpO2: 94% (19 Jan 2020 08:30) (94% - 100%)I&O's Summary    18 Jan 2020 07:01  -  19 Jan 2020 07:00  --------------------------------------------------------  IN: 900 mL / OUT: 2000 mL / NET: -1100 mL      PHYSICAL EXAM:  GENERAL: NAD,   HEENT: PERRL, +EOMI, anicteric, no [place]  NECK: Supple, No JVD   CHEST/LUNG: CTA bilaterally; Normal effort  HEART: S1S2 Normal intensity, no murmurs, gallops or rubs noted  ABDOMEN: Soft, BS Normoactive, NT, ND, no HSM noted  EXTREMITIES:  2+ radial and DP pulses noted, no clubbing, cyanosis, or edema noted, Bed bound   SKIN: Unstageable sacral decubital ulcer.   NEURO: A&O, Paraparesis CN II-XII intact  PSYCH: Depressed mood and affect; insight/judgement appropriate  LABS:              RADIOLOGY & ADDITIONAL TESTS:    MEDICATIONS:  MEDICATIONS  (STANDING):  albuterol/ipratropium for Nebulization. 3 milliLiter(s) Nebulizer every 6 hours  aspirin  chewable 81 milliGRAM(s) Oral daily  chlorhexidine 2% Cloths 1 Application(s) Topical <User Schedule>  chlorhexidine 4% Liquid 1 Application(s) Topical <User Schedule>  collagenase Ointment 1 Application(s) Topical daily  Dakins Solution - 1/2 Strength 1 Application(s) Topical daily  dextrose 5%. 1000 milliLiter(s) (50 mL/Hr) IV Continuous <Continuous>  dextrose 50% Injectable 12.5 Gram(s) IV Push once  dextrose 50% Injectable 25 Gram(s) IV Push once  dextrose 50% Injectable 25 Gram(s) IV Push once  epoetin sara Injectable 05959 Unit(s) IV Push <User Schedule>  heparin  Injectable 5000 Unit(s) SubCutaneous every 8 hours  insulin lispro (HumaLOG) corrective regimen sliding scale   SubCutaneous every 6 hours  lactulose Syrup 10 Gram(s) Oral two times a day  pantoprazole  Injectable 40 milliGRAM(s) IV Push two times a day    MEDICATIONS  (PRN):  acetaminophen  Suppository .. 650 milliGRAM(s) Rectal every 6 hours PRN Temp greater or equal to 38C (100.4F), Mild Pain (1 - 3)  dextrose 40% Gel 15 Gram(s) Oral once PRN Blood Glucose LESS THAN 70 milliGRAM(s)/deciLiter  glucagon  Injectable 1 milliGRAM(s) IntraMuscular once PRN Glucose <70 milliGRAM(s)/deciLiter  morphine  - Injectable 2 milliGRAM(s) IV Push every 4 hours PRN Severe Pain (7 - 10)  sodium chloride 0.9% lock flush 10 milliLiter(s) IV Push every 1 hour PRN Pre/post blood products, medications, blood draw, and to maintain line patency

## 2020-01-19 NOTE — PROCEDURE NOTE - NSICDXPROCEDURE_GEN_ALL_CORE_FT
PROCEDURES:  Insertion, nasogastric tube 03-Jan-2020 16:10:51  Radha Bowman
PROCEDURES:  Irrigation and debridement, ulcer, sacral region 19-Jan-2020 17:55:38  Saw Rodriguez

## 2020-01-20 LAB
ANION GAP SERPL CALC-SCNC: 13 MMOL/L — SIGNIFICANT CHANGE UP (ref 5–17)
BUN SERPL-MCNC: 36 MG/DL — HIGH (ref 8–20)
CALCIUM SERPL-MCNC: 7.9 MG/DL — LOW (ref 8.6–10.2)
CHLORIDE SERPL-SCNC: 94 MMOL/L — LOW (ref 98–107)
CO2 SERPL-SCNC: 26 MMOL/L — SIGNIFICANT CHANGE UP (ref 22–29)
CREAT SERPL-MCNC: 2.71 MG/DL — HIGH (ref 0.5–1.3)
GLUCOSE BLDC GLUCOMTR-MCNC: 200 MG/DL — HIGH (ref 70–99)
GLUCOSE BLDC GLUCOMTR-MCNC: 308 MG/DL — HIGH (ref 70–99)
GLUCOSE SERPL-MCNC: 295 MG/DL — HIGH (ref 70–115)
HCT VFR BLD CALC: 25.3 % — LOW (ref 34.5–45)
HGB BLD-MCNC: 8.6 G/DL — LOW (ref 11.5–15.5)
MCHC RBC-ENTMCNC: 29.5 PG — SIGNIFICANT CHANGE UP (ref 27–34)
MCHC RBC-ENTMCNC: 34 GM/DL — SIGNIFICANT CHANGE UP (ref 32–36)
MCV RBC AUTO: 86.6 FL — SIGNIFICANT CHANGE UP (ref 80–100)
PLATELET # BLD AUTO: 349 K/UL — SIGNIFICANT CHANGE UP (ref 150–400)
POTASSIUM SERPL-MCNC: 3.8 MMOL/L — SIGNIFICANT CHANGE UP (ref 3.5–5.3)
POTASSIUM SERPL-SCNC: 3.8 MMOL/L — SIGNIFICANT CHANGE UP (ref 3.5–5.3)
RBC # BLD: 2.92 M/UL — LOW (ref 3.8–5.2)
RBC # FLD: 19.9 % — HIGH (ref 10.3–14.5)
SODIUM SERPL-SCNC: 133 MMOL/L — LOW (ref 135–145)
WBC # BLD: 11.07 K/UL — HIGH (ref 3.8–10.5)
WBC # FLD AUTO: 11.07 K/UL — HIGH (ref 3.8–10.5)

## 2020-01-20 PROCEDURE — 99233 SBSQ HOSP IP/OBS HIGH 50: CPT

## 2020-01-20 PROCEDURE — 99231 SBSQ HOSP IP/OBS SF/LOW 25: CPT

## 2020-01-20 RX ADMIN — MORPHINE SULFATE 2 MILLIGRAM(S): 50 CAPSULE, EXTENDED RELEASE ORAL at 11:54

## 2020-01-20 RX ADMIN — PANTOPRAZOLE SODIUM 40 MILLIGRAM(S): 20 TABLET, DELAYED RELEASE ORAL at 05:17

## 2020-01-20 RX ADMIN — CHLORHEXIDINE GLUCONATE 1 APPLICATION(S): 213 SOLUTION TOPICAL at 05:18

## 2020-01-20 RX ADMIN — Medication 8: at 11:24

## 2020-01-20 RX ADMIN — Medication 81 MILLIGRAM(S): at 11:26

## 2020-01-20 RX ADMIN — Medication 1 APPLICATION(S): at 11:26

## 2020-01-20 RX ADMIN — MORPHINE SULFATE 2 MILLIGRAM(S): 50 CAPSULE, EXTENDED RELEASE ORAL at 02:00

## 2020-01-20 RX ADMIN — Medication 2: at 18:06

## 2020-01-20 RX ADMIN — Medication 3 MILLILITER(S): at 14:50

## 2020-01-20 RX ADMIN — MORPHINE SULFATE 2 MILLIGRAM(S): 50 CAPSULE, EXTENDED RELEASE ORAL at 21:37

## 2020-01-20 RX ADMIN — Medication 3 MILLILITER(S): at 03:30

## 2020-01-20 RX ADMIN — HEPARIN SODIUM 5000 UNIT(S): 5000 INJECTION INTRAVENOUS; SUBCUTANEOUS at 21:22

## 2020-01-20 RX ADMIN — Medication 3 MILLILITER(S): at 08:31

## 2020-01-20 RX ADMIN — MORPHINE SULFATE 2 MILLIGRAM(S): 50 CAPSULE, EXTENDED RELEASE ORAL at 11:11

## 2020-01-20 RX ADMIN — PANTOPRAZOLE SODIUM 40 MILLIGRAM(S): 20 TABLET, DELAYED RELEASE ORAL at 18:07

## 2020-01-20 RX ADMIN — MORPHINE SULFATE 2 MILLIGRAM(S): 50 CAPSULE, EXTENDED RELEASE ORAL at 01:26

## 2020-01-20 RX ADMIN — HEPARIN SODIUM 5000 UNIT(S): 5000 INJECTION INTRAVENOUS; SUBCUTANEOUS at 15:43

## 2020-01-20 RX ADMIN — LACTULOSE 10 GRAM(S): 10 SOLUTION ORAL at 18:06

## 2020-01-20 RX ADMIN — Medication 1 APPLICATION(S): at 05:17

## 2020-01-20 RX ADMIN — MORPHINE SULFATE 2 MILLIGRAM(S): 50 CAPSULE, EXTENDED RELEASE ORAL at 21:22

## 2020-01-20 RX ADMIN — HEPARIN SODIUM 5000 UNIT(S): 5000 INJECTION INTRAVENOUS; SUBCUTANEOUS at 05:17

## 2020-01-20 NOTE — PROGRESS NOTE ADULT - SUBJECTIVE AND OBJECTIVE BOX
CC: ESRD on HD . Unstageable sacral decubital ulcer, status post bedside debridement 1/19/20   HPI:  63 y/o F with a h/o dCHF, moderate pHTN, CAD, CKD, HTN, DM, cirrhosis, portal gastropathy, recurrent GIB, blind, large sacral wound, presents to the ED from NH with AMS, hypoxemia, and hypotension. Patient had large melenotic BM in the ED. H&H: 5.8/17. Lactate: 2.7. KEITH on CKD. Hyperkalemic. Volume resuscitation began with 3u PRBC. Patient minimally responsive and moaning in pain. UA(+) with thick tan urine in fontenot. Developed progressive respiratory distress and hypoxemia requiring NIPPV. (26 Dec 2019 03:58)    REVIEW OF SYSTEMS:    Patient denied fever, chills, abdominal pain, nausea, vomiting, cough, shortness of breath, chest pain or palpitations    Vital Signs Last 24 Hrs  T(C): 36.8 (20 Jan 2020 08:46), Max: 37.2 (19 Jan 2020 23:32)  T(F): 98.3 (20 Jan 2020 08:46), Max: 99 (19 Jan 2020 23:32)  HR: 88 (20 Jan 2020 08:51) (86 - 101)  BP: 103/59 (20 Jan 2020 08:46) (97/69 - 103/59)  BP(mean): --  RR: 18 (20 Jan 2020 08:46) (18 - 18)  SpO2: 97% (20 Jan 2020 08:46) (94% - 99%)I&O's Summary    19 Jan 2020 07:01  -  20 Jan 2020 07:00  --------------------------------------------------------  IN: 870 mL / OUT: 0 mL / NET: 870 mL      PHYSICAL EXAM:  GENERAL: NAD,   HEENT: PERRL, +EOMI, anicteric, no [place]  NECK: Supple, No JVD   CHEST/LUNG: CTA bilaterally; Normal effort  HEART: S1S2 Normal intensity, no murmurs, gallops or rubs noted  ABDOMEN: Soft, BS Normoactive, NT, ND, no HSM noted  EXTREMITIES:  2+ radial and DP pulses noted, no clubbing, cyanosis, or edema noted, Limited Mobility   SKIN: Unstageable sacral decubital ulcer   NEURO: A&Ox3, no focal deficits noted, CN II-XII intact  PSYCH: Depressed  mood and affect; insight/judgement appropriate  LABS:              RADIOLOGY & ADDITIONAL TESTS:    MEDICATIONS:  MEDICATIONS  (STANDING):  albuterol/ipratropium for Nebulization. 3 milliLiter(s) Nebulizer every 6 hours  aspirin  chewable 81 milliGRAM(s) Oral daily  chlorhexidine 2% Cloths 1 Application(s) Topical <User Schedule>  chlorhexidine 4% Liquid 1 Application(s) Topical <User Schedule>  collagenase Ointment 1 Application(s) Topical daily  collagenase Ointment 1 Application(s) Topical daily  Dakins Solution - 1/2 Strength 1 Application(s) Topical daily  dextrose 5%. 1000 milliLiter(s) (50 mL/Hr) IV Continuous <Continuous>  dextrose 50% Injectable 12.5 Gram(s) IV Push once  dextrose 50% Injectable 25 Gram(s) IV Push once  dextrose 50% Injectable 25 Gram(s) IV Push once  epoetin sara Injectable 46039 Unit(s) IV Push <User Schedule>  heparin  Injectable 5000 Unit(s) SubCutaneous every 8 hours  insulin lispro (HumaLOG) corrective regimen sliding scale   SubCutaneous every 6 hours  lactulose Syrup 10 Gram(s) Oral two times a day  pantoprazole  Injectable 40 milliGRAM(s) IV Push two times a day    MEDICATIONS  (PRN):  acetaminophen  Suppository .. 650 milliGRAM(s) Rectal every 6 hours PRN Temp greater or equal to 38C (100.4F), Mild Pain (1 - 3)  dextrose 40% Gel 15 Gram(s) Oral once PRN Blood Glucose LESS THAN 70 milliGRAM(s)/deciLiter  glucagon  Injectable 1 milliGRAM(s) IntraMuscular once PRN Glucose <70 milliGRAM(s)/deciLiter  morphine  - Injectable 2 milliGRAM(s) IV Push every 4 hours PRN Severe Pain (7 - 10)  sodium chloride 0.9% lock flush 10 milliLiter(s) IV Push every 1 hour PRN Pre/post blood products, medications, blood draw, and to maintain line patency

## 2020-01-20 NOTE — PROGRESS NOTE ADULT - ASSESSMENT
64 year old female previously from a nursing home with paraplegia with a large unstageable sacral decubitus ulcer requiring bedside debridement.     - No acute surgical intervention at this time  - Daily wet to dry dressing changes, with santyl  - Rest of care per primary 64 year old female previously from a nursing home with paraplegia with a Stage III (possible stage IV) sacral decubitus ulcer     Blunt bedside debridement performed by surgical team 1/19/2020.      Daily Dressing changes by RN with damon santyl to wound, will place wound care orders.    Recommend [facility] pressure offloading bed.      Surgery team to assess wound daily to determine need for further debridement.

## 2020-01-20 NOTE — PROGRESS NOTE ADULT - ASSESSMENT
65 yo female with hx of CHFpEF, CAD, CKD, HTN, DM, cirrhosis, portal gastropathy, recurrent GI bleed, blind, sacral decub ulcer, presented to the ED from nursing home with encephalopathy, hypoxic resp failure requiring bipap, in ED found to have anemia with Hb 5 that responded to blood transfusions (hx of recurrent GI bleed for which multiple EGDs/colonoscopies have been performed, no further invasive work up as per GI). Also noted to have sepsis on admission that was secondary to PNA, possibly gram negative bacteria. Patient completed a course of zosyn. Also with KEITH on CKD now requiring HD via left IJ. Right chest tunnelled cath placed for access on 1/3/20. Patient failed s/s and is NPO. NGT feeding was started and family to make now opting for PEG. Had upper ext mapping US showed left brachial veins DVT around the catheter and superficial right basilic and left cephalic vein thrombosis which vascular recommends full anticoagulation if not contraindicated due to GI bleed on admission; discussed with GI to hold on full dose anticoagulation for now.      #Oropharyngeal dysphagia   - failed MBS abd swallow evaluation  - s/p PEG , tolerating feeding   - at goal of 60cc/hour    #Unstageable Sacral Decub - macerated and foul smelling   Contacted surgery for possible debridement.  Dress daily with dakin solution  Wound care consult   Surgery did debridement bedside 1/19/20   clean with half strength dakin solution qd     #Right pneumonia - suspected  aspiration  - treated initially abx restarted  for worsening infiltrate / CXR  - Abx completed and DC     #bilateral superficial vein thrombosis on US doppler of upper ext and DVT on left brachial  vein around the catheter  - vascular rec anticoagulation due to GI bleed will hold off;  - poor candidate for full anticoagulation due to GI bleed on this admission and again in the past  - cont aspirin for now    #Acute on chronic diastolic CHF/ Acute hypoxic respiratory failure- improving   - cont HD for fluid removal   - Bipap as need.   -Supplemental oxygen via  NC comfortable     #Acute blood loss Anemia  - Hx of recurrent GI bleed? prior EGDs/ colonoscopies this year which were negative, prior CTA with +transverse colon bleed (Jan of 2019)  - Seen by GI on this admission no further work up   - repeat blood transfusion required for drop in hgb  - cont Protonix daily    #CASTAÑEDA Cirrhosis with portal gastropathy and AVMs  - change lactulose to standing through PEG with holding parameters  - c/w rifaximin  - cont PPI    #KEITH/ATN on CKD- started on new HD   - cont HD per nephrology   - nephrology follow up appreciated  - HD TTS schedule; next HD tomorrow  - Needs AVF, outpt HD set up  - Continue QUENTIN , PRBC transfusion prn with HD    #DM2- cont sliding scale insulin coverage   - Cont sliding scale insulin coverage     Eventual discharge to [facility]; palliative care evaluation prior however at this time family not for DNR/DNI and want all possible treatment options.  Pt needs AVF and HD seat.

## 2020-01-20 NOTE — PROGRESS NOTE ADULT - SUBJECTIVE AND OBJECTIVE BOX
[facility] DIVISION OF KIDNEY DISEASES AND HYPERTENSION -- HEMODIALYSIS NOTE  --------------------------------------------------------------------------------  Chief Complaint: ESRD/Ongoing hemodialysis requirement    24 hour events/subjective:  Pt s/p bedside debridement of sacral ulcer yesterday  Pt seen and examined; denies any acute complaint      PAST HISTORY  --------------------------------------------------------------------------------  No significant changes to PMH, PSH, FHx, SHx, unless otherwise noted    ALLERGIES & MEDICATIONS  --------------------------------------------------------------------------------  Allergies    ACE inhibitors (Other (Mild))    Intolerances    oxycodone (Sedation/Somnol)    Standing Inpatient Medications  albuterol/ipratropium for Nebulization. 3 milliLiter(s) Nebulizer every 6 hours  aspirin  chewable 81 milliGRAM(s) Oral daily  chlorhexidine 2% Cloths 1 Application(s) Topical <User Schedule>  chlorhexidine 4% Liquid 1 Application(s) Topical <User Schedule>  collagenase Ointment 1 Application(s) Topical daily  collagenase Ointment 1 Application(s) Topical daily  Dakins Solution - 1/2 Strength 1 Application(s) Topical daily  dextrose 5%. 1000 milliLiter(s) IV Continuous <Continuous>  dextrose 50% Injectable 12.5 Gram(s) IV Push once  dextrose 50% Injectable 25 Gram(s) IV Push once  dextrose 50% Injectable 25 Gram(s) IV Push once  epoetin sara Injectable 86423 Unit(s) IV Push <User Schedule>  heparin  Injectable 5000 Unit(s) SubCutaneous every 8 hours  insulin lispro (HumaLOG) corrective regimen sliding scale   SubCutaneous every 6 hours  lactulose Syrup 10 Gram(s) Oral two times a day  pantoprazole  Injectable 40 milliGRAM(s) IV Push two times a day    PRN Inpatient Medications  acetaminophen  Suppository .. 650 milliGRAM(s) Rectal every 6 hours PRN  dextrose 40% Gel 15 Gram(s) Oral once PRN  glucagon  Injectable 1 milliGRAM(s) IntraMuscular once PRN  morphine  - Injectable 2 milliGRAM(s) IV Push every 4 hours PRN  sodium chloride 0.9% lock flush 10 milliLiter(s) IV Push every 1 hour PRN      REVIEW OF SYSTEMS  --------------------------------------------------------------------------------  Gen: No weight changes, fatigue, fevers/chills, weakness  Skin: No rashes  Head/Eyes/Ears/Mouth: No headache  Respiratory: No dyspnea, cough,  CV: No chest pain, orthopnea  GI: No abdominal pain, diarrhea, constipation, nausea, vomiting,  MSK: No joint pain  Neuro: No dizziness/lightheadedness, weakness  Heme: No bleeding  Psych: No significant nervousness, anxiety, stress, depression    All other systems were reviewed and are negative, except as noted.    VITALS/PHYSICAL EXAM  --------------------------------------------------------------------------------  T(C): 36.8 (01-20-20 @ 08:46), Max: 37.2 (01-19-20 @ 23:32)  HR: 98 (01-20-20 @ 11:12) (86 - 101)  BP: 103/59 (01-20-20 @ 08:46) (97/69 - 103/59)  RR: 18 (01-20-20 @ 08:46) (18 - 18)  SpO2: 97% (01-20-20 @ 11:12) (94% - 99%)  Wt(kg): --      01-19-20 @ 07:01  -  01-20-20 @ 07:00  --------------------------------------------------------  IN: 870 mL / OUT: 0 mL / NET: 870 mL    01-20-20 @ 07:01  -  01-20-20 @ 13:41  --------------------------------------------------------  IN: 240 mL / OUT: 0 mL / NET: 240 mL      Physical Exam:  	Gen: NAD  	HEENT: supple neck  	Pulm: CTA B/L  	CV: RRR, S1S2; no rub  	Abd: +BS, soft, nontender  	UE: Warm, intact strength  	LE: Warm, no edema  	Neuro: No focal deficits  	Psych: Normal affect and mood  	Skin: Warm, without rashes  	Vascular access: RIJ tunneled HD catheter+    	Vascular access: RIJ tunneled HD catheter+    LABS/STUDIES  -------------------------------------------------------------------------------    Iron 25, TIBC 154, %sat 16      [01-02-20 @ 09:39]  Ferritin 668      [01-02-20 @ 09:39]  PTH -- (Ca 8.0)      [01-02-20 @ 17:06]   119  PTH -- (Ca 9.1)      [05-30-19 @ 18:09]   107  Vitamin D (25OH) 25.2      [01-02-20 @ 17:06]  HbA1c 5.8      [12-26-19 @ 09:04]  TSH 1.10      [05-17-19 @ 12:21]    HBsAb 18.0      [12-26-19 @ 22:01]  HBsAg Nonreact      [12-26-19 @ 22:01]  HBcAb Nonreact      [12-26-19 @ 22:01]  HCV 0.20, Nonreact      [12-26-19 @ 22:01]

## 2020-01-20 NOTE — PROGRESS NOTE ADULT - SUBJECTIVE AND OBJECTIVE BOX
HPI/OVERNIGHT EVENTS: Patient examined at bedside. No acute events overnight. Patient's sacral decubitus ulcer debrided at bedside yesterday evening. Dressing changed this AM. Patient has no complaints. Pain is well controlled. Denies fever, chills, nausea, vomiting, chest pain, SOB, dizziness, abd pain or any other concerning symptoms    Vital Signs Last 24 Hrs  T(C): 37.2 (19 Jan 2020 23:32), Max: 37.2 (19 Jan 2020 23:32)  T(F): 99 (19 Jan 2020 23:32), Max: 99 (19 Jan 2020 23:32)  HR: 101 (19 Jan 2020 23:32) (80 - 101)  BP: 97/69 (19 Jan 2020 23:32) (97/69 - 113/65)  BP(mean): --  RR: 18 (19 Jan 2020 23:32) (18 - 18)  SpO2: 94% (19 Jan 2020 20:30) (94% - 100%)    I&O's Detail    18 Jan 2020 07:01  -  19 Jan 2020 07:00  --------------------------------------------------------  IN:    Nepro: 900 mL  Total IN: 900 mL    OUT:    Other: 2000 mL  Total OUT: 2000 mL    Total NET: -1100 mL      19 Jan 2020 07:01  -  20 Jan 2020 00:18  --------------------------------------------------------  IN:    Enteral Tube Flush: 150 mL    Nepro: 720 mL  Total IN: 870 mL    OUT:  Total OUT: 0 mL    Total NET: 870 mL      Constitutional: patient resting comfortably in bed, in no acute distress  HEENT: EOMI / PERRL b/l   Neck: No JVD, full ROM without pain  Respiratory: CTAB respirations are unlabored, no accessory muscle use, no conversational dyspnea  Cardiovascular: regular rate & rhythm   Gastrointestinal: Abdomen soft, non-tender, mildly distended, no rebound tenderness / guarding.   Incision/Wound: large sacral ulcer ([id number]) with some areas of fibrinous exudate, no purulent drainage. no surrounding cellulitis to skin edges. depth beyond subcuteanous fat and infiltrating muscle/bone  Musculoskeletal: No joint pain, swelling or deformity; no limitation of movement      MEDICATIONS  (STANDING):  albuterol/ipratropium for Nebulization. 3 milliLiter(s) Nebulizer every 6 hours  aspirin  chewable 81 milliGRAM(s) Oral daily  chlorhexidine 2% Cloths 1 Application(s) Topical <User Schedule>  chlorhexidine 4% Liquid 1 Application(s) Topical <User Schedule>  collagenase Ointment 1 Application(s) Topical daily  collagenase Ointment 1 Application(s) Topical daily  Dakins Solution - 1/2 Strength 1 Application(s) Topical daily  dextrose 5%. 1000 milliLiter(s) (50 mL/Hr) IV Continuous <Continuous>  dextrose 50% Injectable 12.5 Gram(s) IV Push once  dextrose 50% Injectable 25 Gram(s) IV Push once  dextrose 50% Injectable 25 Gram(s) IV Push once  epoetin sara Injectable 40217 Unit(s) IV Push <User Schedule>  heparin  Injectable 5000 Unit(s) SubCutaneous every 8 hours  insulin lispro (HumaLOG) corrective regimen sliding scale   SubCutaneous every 6 hours  lactulose Syrup 10 Gram(s) Oral two times a day  pantoprazole  Injectable 40 milliGRAM(s) IV Push two times a day    MEDICATIONS  (PRN):  acetaminophen  Suppository .. 650 milliGRAM(s) Rectal every 6 hours PRN Temp greater or equal to 38C (100.4F), Mild Pain (1 - 3)  dextrose 40% Gel 15 Gram(s) Oral once PRN Blood Glucose LESS THAN 70 milliGRAM(s)/deciLiter  glucagon  Injectable 1 milliGRAM(s) IntraMuscular once PRN Glucose <70 milliGRAM(s)/deciLiter  morphine  - Injectable 2 milliGRAM(s) IV Push every 4 hours PRN Severe Pain (7 - 10)  sodium chloride 0.9% lock flush 10 milliLiter(s) IV Push every 1 hour PRN Pre/post blood products, medications, blood draw, and to maintain line patency

## 2020-01-21 LAB
ANION GAP SERPL CALC-SCNC: 10 MMOL/L — SIGNIFICANT CHANGE UP (ref 5–17)
BLD GP AB SCN SERPL QL: SIGNIFICANT CHANGE UP
BUN SERPL-MCNC: 46 MG/DL — HIGH (ref 8–20)
CALCIUM SERPL-MCNC: 7.8 MG/DL — LOW (ref 8.6–10.2)
CHLORIDE SERPL-SCNC: 94 MMOL/L — LOW (ref 98–107)
CO2 SERPL-SCNC: 27 MMOL/L — SIGNIFICANT CHANGE UP (ref 22–29)
CREAT SERPL-MCNC: 3.04 MG/DL — HIGH (ref 0.5–1.3)
GLUCOSE BLDC GLUCOMTR-MCNC: 152 MG/DL — HIGH (ref 70–99)
GLUCOSE BLDC GLUCOMTR-MCNC: 156 MG/DL — HIGH (ref 70–99)
GLUCOSE BLDC GLUCOMTR-MCNC: 157 MG/DL — HIGH (ref 70–99)
GLUCOSE BLDC GLUCOMTR-MCNC: 182 MG/DL — HIGH (ref 70–99)
GLUCOSE BLDC GLUCOMTR-MCNC: 240 MG/DL — HIGH (ref 70–99)
GLUCOSE SERPL-MCNC: 280 MG/DL — HIGH (ref 70–115)
HCT VFR BLD CALC: 23.5 % — LOW (ref 34.5–45)
HGB BLD-MCNC: 7.9 G/DL — LOW (ref 11.5–15.5)
MCHC RBC-ENTMCNC: 28.9 PG — SIGNIFICANT CHANGE UP (ref 27–34)
MCHC RBC-ENTMCNC: 33.6 GM/DL — SIGNIFICANT CHANGE UP (ref 32–36)
MCV RBC AUTO: 86.1 FL — SIGNIFICANT CHANGE UP (ref 80–100)
PLATELET # BLD AUTO: 356 K/UL — SIGNIFICANT CHANGE UP (ref 150–400)
POTASSIUM SERPL-MCNC: 3.7 MMOL/L — SIGNIFICANT CHANGE UP (ref 3.5–5.3)
POTASSIUM SERPL-SCNC: 3.7 MMOL/L — SIGNIFICANT CHANGE UP (ref 3.5–5.3)
RBC # BLD: 2.73 M/UL — LOW (ref 3.8–5.2)
RBC # FLD: 19.9 % — HIGH (ref 10.3–14.5)
SODIUM SERPL-SCNC: 131 MMOL/L — LOW (ref 135–145)
WBC # BLD: 11.48 K/UL — HIGH (ref 3.8–10.5)
WBC # FLD AUTO: 11.48 K/UL — HIGH (ref 3.8–10.5)

## 2020-01-21 PROCEDURE — 90937 HEMODIALYSIS REPEATED EVAL: CPT

## 2020-01-21 PROCEDURE — 99232 SBSQ HOSP IP/OBS MODERATE 35: CPT

## 2020-01-21 PROCEDURE — 99232 SBSQ HOSP IP/OBS MODERATE 35: CPT | Mod: GC

## 2020-01-21 RX ORDER — MORPHINE SULFATE 50 MG/1
1 CAPSULE, EXTENDED RELEASE ORAL ONCE
Refills: 0 | Status: DISCONTINUED | OUTPATIENT
Start: 2020-01-21 | End: 2020-01-21

## 2020-01-21 RX ADMIN — MORPHINE SULFATE 2 MILLIGRAM(S): 50 CAPSULE, EXTENDED RELEASE ORAL at 06:00

## 2020-01-21 RX ADMIN — MORPHINE SULFATE 2 MILLIGRAM(S): 50 CAPSULE, EXTENDED RELEASE ORAL at 23:00

## 2020-01-21 RX ADMIN — Medication 3 MILLILITER(S): at 08:18

## 2020-01-21 RX ADMIN — PANTOPRAZOLE SODIUM 40 MILLIGRAM(S): 20 TABLET, DELAYED RELEASE ORAL at 05:05

## 2020-01-21 RX ADMIN — HEPARIN SODIUM 5000 UNIT(S): 5000 INJECTION INTRAVENOUS; SUBCUTANEOUS at 05:05

## 2020-01-21 RX ADMIN — MORPHINE SULFATE 2 MILLIGRAM(S): 50 CAPSULE, EXTENDED RELEASE ORAL at 05:44

## 2020-01-21 RX ADMIN — LACTULOSE 10 GRAM(S): 10 SOLUTION ORAL at 05:05

## 2020-01-21 RX ADMIN — MORPHINE SULFATE 1 MILLIGRAM(S): 50 CAPSULE, EXTENDED RELEASE ORAL at 21:00

## 2020-01-21 RX ADMIN — Medication 1 APPLICATION(S): at 05:05

## 2020-01-21 RX ADMIN — Medication 3 MILLILITER(S): at 20:47

## 2020-01-21 RX ADMIN — MORPHINE SULFATE 2 MILLIGRAM(S): 50 CAPSULE, EXTENDED RELEASE ORAL at 01:36

## 2020-01-21 RX ADMIN — CHLORHEXIDINE GLUCONATE 1 APPLICATION(S): 213 SOLUTION TOPICAL at 05:06

## 2020-01-21 RX ADMIN — Medication 1 APPLICATION(S): at 11:59

## 2020-01-21 RX ADMIN — MORPHINE SULFATE 2 MILLIGRAM(S): 50 CAPSULE, EXTENDED RELEASE ORAL at 11:21

## 2020-01-21 RX ADMIN — LACTULOSE 10 GRAM(S): 10 SOLUTION ORAL at 17:34

## 2020-01-21 RX ADMIN — MORPHINE SULFATE 2 MILLIGRAM(S): 50 CAPSULE, EXTENDED RELEASE ORAL at 11:51

## 2020-01-21 RX ADMIN — MORPHINE SULFATE 2 MILLIGRAM(S): 50 CAPSULE, EXTENDED RELEASE ORAL at 22:38

## 2020-01-21 RX ADMIN — MORPHINE SULFATE 1 MILLIGRAM(S): 50 CAPSULE, EXTENDED RELEASE ORAL at 20:36

## 2020-01-21 RX ADMIN — ERYTHROPOIETIN 10000 UNIT(S): 10000 INJECTION, SOLUTION INTRAVENOUS; SUBCUTANEOUS at 13:43

## 2020-01-21 RX ADMIN — Medication 2: at 16:45

## 2020-01-21 RX ADMIN — Medication 4: at 11:58

## 2020-01-21 RX ADMIN — HEPARIN SODIUM 5000 UNIT(S): 5000 INJECTION INTRAVENOUS; SUBCUTANEOUS at 21:45

## 2020-01-21 RX ADMIN — Medication 2: at 00:13

## 2020-01-21 RX ADMIN — CHLORHEXIDINE GLUCONATE 1 APPLICATION(S): 213 SOLUTION TOPICAL at 05:05

## 2020-01-21 RX ADMIN — MORPHINE SULFATE 2 MILLIGRAM(S): 50 CAPSULE, EXTENDED RELEASE ORAL at 18:24

## 2020-01-21 RX ADMIN — Medication 2: at 21:45

## 2020-01-21 RX ADMIN — Medication 81 MILLIGRAM(S): at 17:33

## 2020-01-21 RX ADMIN — PANTOPRAZOLE SODIUM 40 MILLIGRAM(S): 20 TABLET, DELAYED RELEASE ORAL at 17:34

## 2020-01-21 RX ADMIN — Medication 2: at 05:14

## 2020-01-21 RX ADMIN — MORPHINE SULFATE 2 MILLIGRAM(S): 50 CAPSULE, EXTENDED RELEASE ORAL at 01:54

## 2020-01-21 NOTE — PROGRESS NOTE ADULT - SUBJECTIVE AND OBJECTIVE BOX
Vital Signs Last 24 Hrs,    T(C): 36.4 (21 Jan 2020 12:13), Max: 37.4 (20 Jan 2020 23:42)  T(F): 97.5 (21 Jan 2020 12:13), Max: 99.4 (20 Jan 2020 23:42)  HR: 92 (21 Jan 2020 12:13) (85 - 94)  BP: 108/57 (21 Jan 2020 12:13) (97/56 - 109/67)  RR: 18 (21 Jan 2020 12:13) (18 - 19)  SpO2: 95% (21 Jan 2020 12:13) (94% - 97%)    133<L>  |  94<L>  |  36.0<H>  ----------------------------<  295<H>  Ca:7.9<L> (20 Jan 2020 14:54)  3.8     |  26.0   |  2.71<H>    eGFR if Non : 18 <L>  eGFR if : 21 <L>                              8.6<L>  11.07<H> )-----------( 349      ( 20 Jan 2020 14:54 )                 25.3<L>    Phos:3.2 mg/dL .,    Chief Complaint: ESRD/Ongoing hemodialysis requirement    24 hour events/subjective:    Pt s/p bedside debridement of sacral ulcer  2 Days ago,     Pt seen and examined; denies any acute complaint    PAST HISTORY  --------------------------------------------------------------------------------  No significant changes to PMH, PSH, FHx, SHx, unless otherwise noted    ALLERGIES & MEDICATIONS  --------------------------------------------------------------------------------  Allergies    ACE inhibitors (Other (Mild))    Intolerances    oxycodone (Sedation/Somnol)    Standing Inpatient Medications  albuterol/ipratropium for Nebulization. 3 milliLiter(s) Nebulizer every 6 hours  aspirin  chewable 81 milliGRAM(s) Oral daily  chlorhexidine 2% Cloths 1 Application(s) Topical <User Schedule>  chlorhexidine 4% Liquid 1 Application(s) Topical <User Schedule>  collagenase Ointment 1 Application(s) Topical daily  collagenase Ointment 1 Application(s) Topical daily  Dakins Solution - 1/2 Strength 1 Application(s) Topical daily  dextrose 5%. 1000 milliLiter(s) IV Continuous <Continuous>  dextrose 50% Injectable 12.5 Gram(s) IV Push once  dextrose 50% Injectable 25 Gram(s) IV Push once  dextrose 50% Injectable 25 Gram(s) IV Push once  epoetin sara Injectable 70012 Unit(s) IV Push <User Schedule>  heparin  Injectable 5000 Unit(s) SubCutaneous every 8 hours  insulin lispro (HumaLOG) corrective regimen sliding scale   SubCutaneous every 6 hours  lactulose Syrup 10 Gram(s) Oral two times a day  pantoprazole  Injectable 40 milliGRAM(s) IV Push two times a day    PRN Inpatient Medications  acetaminophen  Suppository .. 650 milliGRAM(s) Rectal every 6 hours PRN  dextrose 40% Gel 15 Gram(s) Oral once PRN  glucagon  Injectable 1 milliGRAM(s) IntraMuscular once PRN  morphine  - Injectable 2 milliGRAM(s) IV Push every 4 hours PRN  sodium chloride 0.9% lock flush 10 milliLiter(s) IV Push every 1 hour PRN    REVIEW OF SYSTEMS  --------------------------------------------------------------------------------  Gen: No weight changes, fatigue, fevers/chills, weakness  Skin: No rashes  Head/Eyes/Ears/Mouth: No headache  Respiratory: No dyspnea, cough,  CV: No chest pain, orthopnea  GI: No abdominal pain, diarrhea, constipation, nausea, vomiting,  MSK: No joint pain  Neuro: No dizziness/lightheadedness, weakness  Heme: No bleeding  Psych: No significant nervousness, anxiety, stress, depression    All other systems were reviewed and are negative, except as noted.    VITALS/PHYSICAL EXAM  --------------------------------------------------------------------------------  T(C): 36.8 (01-20-20 @ 08:46), Max: 37.2 (01-19-20 @ 23:32)  HR: 98 (01-20-20 @ 11:12) (86 - 101)  BP: 103/59 (01-20-20 @ 08:46) (97/69 - 103/59)  RR: 18 (01-20-20 @ 08:46) (18 - 18)  SpO2: 97% (01-20-20 @ 11:12) (94% - 99%)    01-19-20 @ 07:01  -  01-20-20 @ 07:00  --------------------------------------------------------  IN: 870 mL / OUT: 0 mL / NET: 870 mL    01-20-20 @ 07:01  -  01-20-20 @ 13:41  --------------------------------------------------------  IN: 240 mL / OUT: 0 mL / NET: 240 mL    Physical Exam:  	Gen: NAD  	HEENT: supple neck  	Pulm: CTA B/L  	CV: RRR, S1S2; no rub  	Abd: +BS, soft, nontender, + PEG,   	UE: Warm, intact strength  	LE: Warm, no edema  	Neuro: No focal deficits  	Psych: Normal affect and mood  	Skin: Warm, without rashes  	Vascular access: RIJ tunneled HD catheter+ , Large Sacral Ulcer,     	Vascular access: RIJ tunneled HD catheter+    LABS/STUDIES  -------------------------------------------------------------------------------    Iron 25, TIBC 154, %sat 16      [01-02-20 @ 09:39]  Ferritin 668      [01-02-20 @ 09:39]  PTH -- (Ca 8.0)      [01-02-20 @ 17:06]   119  PTH -- (Ca 9.1)      [05-30-19 @ 18:09]   107  Vitamin D (25OH) 25.2      [01-02-20 @ 17:06]  HbA1c 5.8      [12-26-19 @ 09:04]  TSH 1.10      [05-17-19 @ 12:21]    HBsAb 18.0      [12-26-19 @ 22:01]  HBsAg Nonreact      [12-26-19 @ 22:01]  HBcAb Nonreact      [12-26-19 @ 22:01]  HCV 0.20, Nonreact      [12-26-19 @ 22:01]      1) KEITH on CKD- now deemed ESRD  2) encephalopathy- improved  4) Acute blood loss anemia superimposed on anemia of CKD  5) Hypotension- BP now improved    HD TTS schedule,  Needs AVF, outpt HD set up  Continue QUENTIN , PRBC transfusion prn with HD    Will follow

## 2020-01-21 NOTE — PROCEDURE NOTE - PROCEDURE DATE TIME, MLM
19-Jan-2020 17:30
03-Jan-2020 16:06
21-Jan-2020 08:33
26-Dec-2019 12:57
28-Dec-2019 18:00
30-Dec-2019

## 2020-01-21 NOTE — PROGRESS NOTE ADULT - ASSESSMENT
65 yo female with hx of CHFpEF, CAD, CKD, HTN, DM, cirrhosis, portal gastropathy, recurrent GI bleed, blind, sacral decub ulcer, presented to the ED from nursing home with encephalopathy, hypoxic resp failure requiring bipap, in ED found to have anemia with Hb 5 that responded to blood transfusions (hx of recurrent GI bleed for which multiple EGDs/colonoscopies have been performed, no further invasive work up as per GI). Also noted to have sepsis on admission that was secondary to PNA, possibly gram negative bacteria. Patient completed a course of zosyn. Also with KEITH on CKD now requiring HD via left IJ. Right chest tunnelled cath placed for access on 1/3/20. Patient failed s/s and is NPO. NGT feeding was started and family to make now opting for PEG. Had upper ext mapping US showed left brachial veins DVT around the catheter and superficial right basilic and left cephalic vein thrombosis which vascular recommends full anticoagulation if not contraindicated due to GI bleed on admission; discussed with GI to hold on full dose anticoagulation for now.      #Oropharyngeal dysphagia   - failed MBS abd swallow evaluation  - s/p PEG , tolerating feeding   - at goal of 60cc/hour    #Unstageable Sacral Decub -   surgery re-consulted for possible debridement.  Dress daily with dakin solution  Wound care consult   Surgery did debridement bedside 1/19/20 and 1/21/2020  clean with half strength dakin solution qd   -plan for OR debridement 1/22 with surgical team  -family previously declining OR /sedation for pt, now as per surgery, family is agreeable    #Right pneumonia - suspected  aspiration  - treated initially abx restarted  for worsening infiltrate / CXR  - Abx completed     #bilateral superficial vein thrombosis on US doppler of upper ext and DVT on left brachial  vein around the catheter  - vascular rec anticoagulation due to GI bleed will hold off;  - poor candidate for full anticoagulation due to GI bleed on this admission and again in the past  - cont aspirin for now    #Acute on chronic diastolic CHF/ Acute hypoxic respiratory failure- improving   - cont HD for fluid removal   - Bipap as need.   -Supplemental oxygen via  NC comfortable     #Acute blood loss Anemia  - Hx of recurrent GI bleed? prior EGDs/ colonoscopies this year which were negative, prior CTA with +transverse colon bleed (Jan of 2019)  - Seen by GI on this admission no further work up   - repeat blood transfusion required for drop in hgb  - cont Protonix daily    #CASTAÑEDA Cirrhosis with portal gastropathy and AVMs  - change lactulose to standing through PEG with holding parameters  - c/w rifaximin  - cont PPI    #KEITH/ATN on CKD- started on new HD   - cont HD per nephrology   - nephrology follow up appreciated  - HD TTS schedule; next HD tomorrow  - Needs AVF, outpt HD set up  - Continue QUENTIN , PRBC transfusion prn with HD    #DM2- cont sliding scale insulin coverage   - Cont sliding scale insulin coverage     Eventual discharge to [facility]; palliative care evaluation prior, however at this time family not for DNR/DNI and want all possible treatment options.  Pt needs AVF and HD seat.

## 2020-01-21 NOTE — CHART NOTE - NSCHARTNOTEFT_GEN_A_CORE
Surgical team called emergency contact of the family for the patient. The situation regarding her wound and ability to tolerate debridement was discussed. Family member Abdoulaye Alas, relayed that she and her family had a discussion and wish for the wound be cleaned in the operative setting. She reports that if patient needs anesthetics or sedation, we can proceed with the case to clean out the wound

## 2020-01-21 NOTE — PROCEDURE NOTE - NSINDICATIONS_GEN_A_CORE
devitalized or nonviable tissue at the level of:
dialysis/CRRT
venous access
devitalized or nonviable tissue at the level of:
devitalized or nonviable tissue at the level of:/removal of deep seated foreign material
feeding difficulties

## 2020-01-21 NOTE — PROGRESS NOTE ADULT - ASSESSMENT
65yo F wit paraplegia presented from nursing home for Stage III-IV sacral decubitus ulcer being followed by surgery for wound care. Daily dressing changes per nursing and local debridement as needed by surgery team.    - Daily dressing change per nursing with Santyl  - Wound check by surgery today to determine need for additional debridement    - Offloading bed and boots recommended 63yo F wit paraplegia presented from nursing home for Stage III-IV sacral decubitus ulcer being followed by surgery for wound care. Daily dressing changes per nursing and local debridement as needed by surgery team.    - Daily dressing change per nursing with Santyl  - Wound check by surgery today to determine need for additional debridement    - Offloading bed and boots recommended  - Patient should be on a [facility] bed, with offloading   - Patient needs Debridement in the OR. Plan for 1/22  - Patient Should be NPO at midnight with pre-operative labs

## 2020-01-21 NOTE — PROGRESS NOTE ADULT - SUBJECTIVE AND OBJECTIVE BOX
HPI/OVERNIGHT EVENTS:  No acute overnight events. Vital signs stable. Seen and examined at bedside found resting comfortably Patient with pain well controlled. Casey dressing changes per nursing. Denies nausea, vomiting, fever, chills, chest pain, shortness of breath, or any new or concerning symptoms.     MEDICATIONS  (STANDING):  albuterol/ipratropium for Nebulization. 3 milliLiter(s) Nebulizer every 6 hours  aspirin  chewable 81 milliGRAM(s) Oral daily  chlorhexidine 2% Cloths 1 Application(s) Topical <User Schedule>  chlorhexidine 4% Liquid 1 Application(s) Topical <User Schedule>  collagenase Ointment 1 Application(s) Topical daily  collagenase Ointment 1 Application(s) Topical daily  Dakins Solution - 1/2 Strength 1 Application(s) Topical daily  dextrose 5%. 1000 milliLiter(s) (50 mL/Hr) IV Continuous <Continuous>  dextrose 50% Injectable 12.5 Gram(s) IV Push once  dextrose 50% Injectable 25 Gram(s) IV Push once  dextrose 50% Injectable 25 Gram(s) IV Push once  epoetin sara Injectable 72296 Unit(s) IV Push <User Schedule>  heparin  Injectable 5000 Unit(s) SubCutaneous every 8 hours  insulin lispro (HumaLOG) corrective regimen sliding scale   SubCutaneous every 6 hours  lactulose Syrup 10 Gram(s) Oral two times a day  pantoprazole  Injectable 40 milliGRAM(s) IV Push two times a day    MEDICATIONS  (PRN):  acetaminophen  Suppository .. 650 milliGRAM(s) Rectal every 6 hours PRN Temp greater or equal to 38C (100.4F), Mild Pain (1 - 3)  dextrose 40% Gel 15 Gram(s) Oral once PRN Blood Glucose LESS THAN 70 milliGRAM(s)/deciLiter  glucagon  Injectable 1 milliGRAM(s) IntraMuscular once PRN Glucose <70 milliGRAM(s)/deciLiter  morphine  - Injectable 2 milliGRAM(s) IV Push every 4 hours PRN Severe Pain (7 - 10)  sodium chloride 0.9% lock flush 10 milliLiter(s) IV Push every 1 hour PRN Pre/post blood products, medications, blood draw, and to maintain line patency      Vital Signs Last 24 Hrs  T(C): 37.4 (20 Jan 2020 23:42), Max: 37.4 (20 Jan 2020 23:42)  T(F): 99.4 (20 Jan 2020 23:42), Max: 99.4 (20 Jan 2020 23:42)  HR: 94 (20 Jan 2020 23:42) (85 - 98)  BP: 97/56 (20 Jan 2020 23:42) (97/56 - 103/59)  BP(mean): --  RR: 18 (20 Jan 2020 23:42) (18 - 18)  SpO2: 97% (20 Jan 2020 16:27) (97% - 99%)    Constitutional: patient resting comfortably in bed, in no acute distress  HEENT: EOMI, no active drainage or redness  Neck: Full ROM without pain  Respiratory: respirations are unlabored, no accessory muscle use, no conversational dyspnea  Cardiovascular: regular rate & rhythm  Gastrointestinal: Abdomen soft, non-tender, non-distended  : Large sacral ulcer with some areas of fibrinous exudate, No purulent drainage or surrounding cellulitis to skin edges. Depth beyond subcutaneous fat, infiltrating muscle/bone.  Musculoskeletal: Baseline paraplegia      I&O's Detail    19 Jan 2020 07:01  -  20 Jan 2020 07:00  --------------------------------------------------------  IN:    Enteral Tube Flush: 150 mL    Nepro: 720 mL  Total IN: 870 mL    OUT:  Total OUT: 0 mL    Total NET: 870 mL      20 Jan 2020 07:01  -  21 Jan 2020 02:47  --------------------------------------------------------  IN:    Enteral Tube Flush: 500 mL    Nepro: 720 mL  Total IN: 1220 mL    OUT:  Total OUT: 0 mL    Total NET: 1220 mL          LABS:                        8.6    11.07 )-----------( 349      ( 20 Jan 2020 14:54 )             25.3     01-20    133<L>  |  94<L>  |  36.0<H>  ----------------------------<  295<H>  3.8   |  26.0  |  2.71<H>    Ca    7.9<L>      20 Jan 2020 14:54

## 2020-01-21 NOTE — PROCEDURE NOTE - NSEQUIPUSED_SKIN_A_CORE
gauze pads/gloves/normal saline solution/forceps
forceps/gauze pads/normal saline solution/suture scissors/cotton-tipped applications/gloves/antiseptic cleaning solution/adhesive tape
suture scissors/gloves

## 2020-01-21 NOTE — PROGRESS NOTE ADULT - SUBJECTIVE AND OBJECTIVE BOX
KUN ROCHA    [id number]    64y      Female    CC: Acute hypoxemic respiratory failure, GIB    INTERVAL HPI/OVERNIGHT EVENTS: Pt seen and examined. No acute events overnight. Declining nebs, bipap.     REVIEW OF SYSTEMS:    CONSTITUTIONAL: No fever, weight loss, or fatigue  RESPIRATORY: No cough, wheezing, hemoptysis; No shortness of breath  CARDIOVASCULAR: No chest pain, palpitations  GASTROINTESTINAL: No abdominal or epigastric pain. No nausea, vomiting  NEUROLOGICAL: No headaches, memory loss, loss of strength.    Vital Signs Last 24 Hrs  T(C): 36.4 (21 Jan 2020 12:13), Max: 37.4 (20 Jan 2020 23:42)  T(F): 97.5 (21 Jan 2020 12:13), Max: 99.4 (20 Jan 2020 23:42)  HR: 92 (21 Jan 2020 12:13) (85 - 94)  BP: 108/57 (21 Jan 2020 12:13) (97/56 - 109/67)  BP(mean): --  RR: 18 (21 Jan 2020 12:13) (18 - 19)  SpO2: 95% (21 Jan 2020 12:13) (94% - 97%)    PHYSICAL EXAM:    GENERAL: NAD  HEENT: PERRL  NECK: soft, supple  CHEST/LUNG: Clear to auscultation bilaterally; non-labored breathing  HEART: S1S2+, Regular rate and rhythm; No murmurs, rubs, or gallops  ABDOMEN: Soft, Nontender, Nondistended; Bowel sounds present  SKIN: warm, dry; unstageable sacral decubital ulcer  NEURO: awake, alert, cooperative with exam    LABS:                        7.9    11.48 )-----------( 356      ( 21 Jan 2020 12:34 )             23.5     01-21    131<L>  |  94<L>  |  46.0<H>  ----------------------------<  280<H>  3.7   |  27.0  |  3.04<H>    Ca    7.8<L>      21 Jan 2020 12:34          MEDICATIONS  (STANDING):  albuterol/ipratropium for Nebulization. 3 milliLiter(s) Nebulizer every 6 hours  aspirin  chewable 81 milliGRAM(s) Oral daily  chlorhexidine 2% Cloths 1 Application(s) Topical <User Schedule>  chlorhexidine 4% Liquid 1 Application(s) Topical <User Schedule>  collagenase Ointment 1 Application(s) Topical daily  Dakins Solution - 1/2 Strength 1 Application(s) Topical daily  dextrose 5%. 1000 milliLiter(s) (50 mL/Hr) IV Continuous <Continuous>  dextrose 50% Injectable 12.5 Gram(s) IV Push once  dextrose 50% Injectable 25 Gram(s) IV Push once  dextrose 50% Injectable 25 Gram(s) IV Push once  epoetin sara Injectable 02812 Unit(s) IV Push <User Schedule>  heparin  Injectable 5000 Unit(s) SubCutaneous every 8 hours  insulin lispro (HumaLOG) corrective regimen sliding scale   SubCutaneous every 6 hours  lactulose Syrup 10 Gram(s) Oral two times a day  pantoprazole  Injectable 40 milliGRAM(s) IV Push two times a day    MEDICATIONS  (PRN):  acetaminophen  Suppository .. 650 milliGRAM(s) Rectal every 6 hours PRN Temp greater or equal to 38C (100.4F), Mild Pain (1 - 3)  dextrose 40% Gel 15 Gram(s) Oral once PRN Blood Glucose LESS THAN 70 milliGRAM(s)/deciLiter  glucagon  Injectable 1 milliGRAM(s) IntraMuscular once PRN Glucose <70 milliGRAM(s)/deciLiter  morphine  - Injectable 2 milliGRAM(s) IV Push every 4 hours PRN Severe Pain (7 - 10)  sodium chloride 0.9% lock flush 10 milliLiter(s) IV Push every 1 hour PRN Pre/post blood products, medications, blood draw, and to maintain line patency      RADIOLOGY & ADDITIONAL TESTS:

## 2020-01-21 NOTE — PROCEDURE NOTE - NSPROCDETAILS_GEN_ALL_CORE
location identified, feeding tube inserted
ultrasound assessment/location identified, draped/prepped, sterile technique used/sterile dressing applied/supine position/ultrasound guidance/sterile technique, catheter placed
Guidewire confirmed by US during procedure/sterile dressing applied/sterile technique, catheter placed/ultrasound guidance/lumen(s) aspirated and flushed/guidewire recovered
incision left open, packing placed
incision left open, packing placed

## 2020-01-21 NOTE — PROCEDURE NOTE - NSINFORMCONSENT_GEN_A_CORE
Benefits, risks, and possible complications of procedure explained to patient/caregiver who verbalized understanding and gave verbal consent.
Benefits, risks, and possible complications of procedure explained to patient/caregiver who verbalized understanding and gave written consent.
Benefits, risks, and possible complications of procedure explained to patient/caregiver who verbalized understanding and gave written consent./Family has provided consent on her behalf for previous debridements
This was an emergent procedure.

## 2020-01-21 NOTE — PROGRESS NOTE ADULT - ASSESSMENT
65 yo female with hx of CHFpEF, CAD, CKD, HTN, DM, cirrhosis, portal gastropathy, recurrent GI bleed, blind, sacral decub ulcer, presented to the ED from nursing home with encephalopathy, hypoxic resp failure requiring bipap, in ED found to have anemia with Hb 5 that responded to blood transfusions (hx of recurrent GI bleed for which multiple EGDs/colonoscopies have been performed, no further invasive work up as per GI). Also noted to have sepsis on admission that was secondary to PNA, possibly gram negative bacteria. Patient completed a course of zosyn. Also with KEITH on CKD now requiring HD via left IJ. Right chest tunnelled cath placed for access on 1/3/20. Patient failed s/s and is NPO. NGT feeding was started and family to make now opting for PEG. Had upper ext mapping US showed left brachial veins DVT around the catheter and superficial right basilic and left cephalic vein thrombosis which vascular recommends full anticoagulation if not contraindicated due to GI bleed on admission; discussed with GI to hold on full dose anticoagulation for now.    #Oropharyngeal dysphagia   - failed MBS abd swallow evaluation  - s/p PEG , tolerating feeding   - at goal of 60cc/hour    #Unstageable Sacral Decub - macerated and foul smelling   Contacted surgery for possible debridement.  Dress daily with dakin solution  Wound care consult   Surgery did debridement bedside 1/19/20   clean with half strength dakin solution qd     #Right pneumonia - suspected  aspiration  - Abx completed and DC     #bilateral superficial vein thrombosis on US doppler of upper ext and DVT on left brachial  vein around the catheter  - poor candidate for full anticoagulation due to GI bleed,  - On ASA,     #Acute on chronic diastolic CHF/ Acute hypoxic respiratory failure- improving   - UF w. HD,   - BiPAP as need.   -Supplemental oxygen via  NC comfortable     #Acute blood loss Anemia  - Hx of recurrent GI bleed? prior EGDs/ colonoscopies this year which were negative, prior CTA with +transverse colon bleed (Jan of 2019)  - Seen by GI on this admission no further work up   - repeat blood transfusion required for drop in hgb  - On Protonix daily    #CASTAÑEDA Cirrhosis with portal gastropathy and AVMs  - On PPI , Lactulose  &  Rifaximin    #ATN > ESRD,     #DM2- On  sliding scale insulin coverage

## 2020-01-21 NOTE — PROCEDURE NOTE - NSFINDINGS_GEN_A_CORE
foul-smelling, debrided to the bone/infection/undermining/tunneling/necrosis
necrosis
borborygmi with insufflation air

## 2020-01-21 NOTE — CHART NOTE - NSCHARTNOTEFT_GEN_A_CORE
pt educated on benefits of drug Neb tx but still declines. NAD knows to call. ... and patient  refused bipap. pt was educated on risks vs benefits. However ,pt still refused after being informed & educated . ask multiple times through out the shift and pt still refuses. RN aware

## 2020-01-22 ENCOUNTER — TRANSCRIPTION ENCOUNTER (OUTPATIENT)
Age: 65
End: 2020-01-22

## 2020-01-22 LAB
ANION GAP SERPL CALC-SCNC: 11 MMOL/L — SIGNIFICANT CHANGE UP (ref 5–17)
BUN SERPL-MCNC: 28 MG/DL — HIGH (ref 8–20)
CALCIUM SERPL-MCNC: 8 MG/DL — LOW (ref 8.6–10.2)
CHLORIDE SERPL-SCNC: 93 MMOL/L — LOW (ref 98–107)
CO2 SERPL-SCNC: 26 MMOL/L — SIGNIFICANT CHANGE UP (ref 22–29)
CREAT SERPL-MCNC: 2 MG/DL — HIGH (ref 0.5–1.3)
CULTURE RESULTS: SIGNIFICANT CHANGE UP
GLUCOSE BLDC GLUCOMTR-MCNC: 159 MG/DL — HIGH (ref 70–99)
GLUCOSE BLDC GLUCOMTR-MCNC: 163 MG/DL — HIGH (ref 70–99)
GLUCOSE BLDC GLUCOMTR-MCNC: 163 MG/DL — HIGH (ref 70–99)
GLUCOSE BLDC GLUCOMTR-MCNC: 188 MG/DL — HIGH (ref 70–99)
GLUCOSE BLDC GLUCOMTR-MCNC: 197 MG/DL — HIGH (ref 70–99)
GLUCOSE SERPL-MCNC: 198 MG/DL — HIGH (ref 70–99)
HCT VFR BLD CALC: 26.4 % — LOW (ref 34.5–45)
HGB BLD-MCNC: 8.3 G/DL — LOW (ref 11.5–15.5)
MCHC RBC-ENTMCNC: 27 PG — SIGNIFICANT CHANGE UP (ref 27–34)
MCHC RBC-ENTMCNC: 31.4 GM/DL — LOW (ref 32–36)
MCV RBC AUTO: 86 FL — SIGNIFICANT CHANGE UP (ref 80–100)
NRBC # BLD: 2 /100 WBCS — HIGH (ref 0–0)
PLATELET # BLD AUTO: 221 K/UL — SIGNIFICANT CHANGE UP (ref 150–400)
POTASSIUM SERPL-MCNC: 4.2 MMOL/L — SIGNIFICANT CHANGE UP (ref 3.5–5.3)
POTASSIUM SERPL-SCNC: 4.2 MMOL/L — SIGNIFICANT CHANGE UP (ref 3.5–5.3)
RBC # BLD: 3.07 M/UL — LOW (ref 3.8–5.2)
RBC # FLD: 20.3 % — HIGH (ref 10.3–14.5)
SODIUM SERPL-SCNC: 130 MMOL/L — LOW (ref 135–145)
SPECIMEN SOURCE: SIGNIFICANT CHANGE UP
WBC # BLD: 10.68 K/UL — HIGH (ref 3.8–10.5)
WBC # FLD AUTO: 10.68 K/UL — HIGH (ref 3.8–10.5)

## 2020-01-22 PROCEDURE — 99232 SBSQ HOSP IP/OBS MODERATE 35: CPT

## 2020-01-22 PROCEDURE — 99223 1ST HOSP IP/OBS HIGH 75: CPT

## 2020-01-22 PROCEDURE — 99233 SBSQ HOSP IP/OBS HIGH 50: CPT

## 2020-01-22 RX ORDER — MORPHINE SULFATE 50 MG/1
2 CAPSULE, EXTENDED RELEASE ORAL EVERY 4 HOURS
Refills: 0 | Status: DISCONTINUED | OUTPATIENT
Start: 2020-01-22 | End: 2020-01-23

## 2020-01-22 RX ADMIN — Medication 1 APPLICATION(S): at 04:45

## 2020-01-22 RX ADMIN — CHLORHEXIDINE GLUCONATE 1 APPLICATION(S): 213 SOLUTION TOPICAL at 07:06

## 2020-01-22 RX ADMIN — Medication 2: at 07:06

## 2020-01-22 RX ADMIN — HEPARIN SODIUM 5000 UNIT(S): 5000 INJECTION INTRAVENOUS; SUBCUTANEOUS at 07:06

## 2020-01-22 RX ADMIN — MORPHINE SULFATE 2 MILLIGRAM(S): 50 CAPSULE, EXTENDED RELEASE ORAL at 18:15

## 2020-01-22 RX ADMIN — MORPHINE SULFATE 2 MILLIGRAM(S): 50 CAPSULE, EXTENDED RELEASE ORAL at 19:40

## 2020-01-22 RX ADMIN — Medication 2: at 00:41

## 2020-01-22 RX ADMIN — MORPHINE SULFATE 2 MILLIGRAM(S): 50 CAPSULE, EXTENDED RELEASE ORAL at 09:41

## 2020-01-22 RX ADMIN — MORPHINE SULFATE 2 MILLIGRAM(S): 50 CAPSULE, EXTENDED RELEASE ORAL at 17:09

## 2020-01-22 RX ADMIN — HEPARIN SODIUM 5000 UNIT(S): 5000 INJECTION INTRAVENOUS; SUBCUTANEOUS at 21:26

## 2020-01-22 RX ADMIN — Medication 2: at 23:54

## 2020-01-22 RX ADMIN — PANTOPRAZOLE SODIUM 40 MILLIGRAM(S): 20 TABLET, DELAYED RELEASE ORAL at 07:06

## 2020-01-22 RX ADMIN — MORPHINE SULFATE 2 MILLIGRAM(S): 50 CAPSULE, EXTENDED RELEASE ORAL at 10:00

## 2020-01-22 RX ADMIN — Medication 3 MILLILITER(S): at 15:12

## 2020-01-22 RX ADMIN — MORPHINE SULFATE 2 MILLIGRAM(S): 50 CAPSULE, EXTENDED RELEASE ORAL at 03:44

## 2020-01-22 RX ADMIN — PANTOPRAZOLE SODIUM 40 MILLIGRAM(S): 20 TABLET, DELAYED RELEASE ORAL at 18:15

## 2020-01-22 RX ADMIN — MORPHINE SULFATE 2 MILLIGRAM(S): 50 CAPSULE, EXTENDED RELEASE ORAL at 13:55

## 2020-01-22 NOTE — PROGRESS NOTE ADULT - SUBJECTIVE AND OBJECTIVE BOX
[facility] DIVISION OF KIDNEY DISEASES AND HYPERTENSION -- FOLLOW UP NOTE  --------------------------------------------------------------------------------  Chief Complaint:  ESRD on HD    24 hour events/subjective:  Pt seen and examined  NAD  HD yesterday      PAST HISTORY  --------------------------------------------------------------------------------  No significant changes to PMH, PSH, FHx, SHx, unless otherwise noted    ALLERGIES & MEDICATIONS  --------------------------------------------------------------------------------  Allergies    ACE inhibitors (Other (Mild))    Intolerances    oxycodone (Sedation/Somnol)    Standing Inpatient Medications  albuterol/ipratropium for Nebulization. 3 milliLiter(s) Nebulizer every 6 hours  aspirin  chewable 81 milliGRAM(s) Oral daily  chlorhexidine 2% Cloths 1 Application(s) Topical <User Schedule>  chlorhexidine 4% Liquid 1 Application(s) Topical <User Schedule>  collagenase Ointment 1 Application(s) Topical daily  Dakins Solution - 1/2 Strength 1 Application(s) Topical daily  dextrose 5%. 1000 milliLiter(s) IV Continuous <Continuous>  dextrose 50% Injectable 12.5 Gram(s) IV Push once  dextrose 50% Injectable 25 Gram(s) IV Push once  dextrose 50% Injectable 25 Gram(s) IV Push once  epoetin sara Injectable 22725 Unit(s) IV Push <User Schedule>  heparin  Injectable 5000 Unit(s) SubCutaneous every 8 hours  insulin lispro (HumaLOG) corrective regimen sliding scale   SubCutaneous every 6 hours  lactulose Syrup 10 Gram(s) Oral two times a day  pantoprazole  Injectable 40 milliGRAM(s) IV Push two times a day    PRN Inpatient Medications  acetaminophen  Suppository .. 650 milliGRAM(s) Rectal every 6 hours PRN  dextrose 40% Gel 15 Gram(s) Oral once PRN  glucagon  Injectable 1 milliGRAM(s) IntraMuscular once PRN  morphine  - Injectable 2 milliGRAM(s) IV Push every 4 hours PRN  sodium chloride 0.9% lock flush 10 milliLiter(s) IV Push every 1 hour PRN      REVIEW OF SYSTEMS  --------------------------------------------------------------------------------  Gen: No weight changes, fatigue, fevers/chills, weakness  Skin: No rashes  Head/Eyes/Ears/Mouth: No headache; Normal hearing; Normal vision w/o blurriness; No sinus pain/discomfort, sore throat  Respiratory: No dyspnea, cough, wheezing, hemoptysis  CV: No chest pain, PND, orthopnea  GI: No abdominal pain, diarrhea, constipation, nausea, vomiting, melena, hematochezia  : No increased frequency, dysuria, hematuria, nocturia  MSK: No joint pain/swelling; no back pain; no edema  Neuro: No dizziness/lightheadedness, weakness, seizures, numbness, tingling  Heme: No easy bruising or bleeding  Endo: No heat/cold intolerance  Psych: No significant nervousness, anxiety, stress, depression    All other systems were reviewed and are negative, except as noted.    VITALS/PHYSICAL EXAM  --------------------------------------------------------------------------------  T(C): 36.7 (01-22-20 @ 08:20), Max: 36.9 (01-21-20 @ 23:35)  HR: 87 (01-22-20 @ 08:20) (87 - 96)  BP: 95/56 (01-22-20 @ 08:20) (95/56 - 142/73)  RR: 18 (01-22-20 @ 08:20) (18 - 18)  SpO2: 96% (01-22-20 @ 08:20) (95% - 100%)  Wt(kg): --        01-21-20 @ 07:01  -  01-22-20 @ 07:00  --------------------------------------------------------  IN: 610 mL / OUT: 1600 mL / NET: -990 mL      Physical Exam:  	Gen: NAD  	HEENT: supple neck  	Pulm: CTA B/L  	CV: RRR, S1S2; no rub  	Abd: +BS, soft, nontender, + PEG   	UE: Warm, intact strength  	LE: Warm, no edema  	Neuro: No focal deficits  	Psych: Normal affect and mood  	Skin: Warm, without rashes, Large sacral ulcer  	Vascular access: [facility]     LABS/STUDIES  --------------------------------------------------------------------------------              8.3    10.68 >-----------<  221      [01-22-20 @ 08:27]              26.4     130  |  93  |  28.0  ----------------------------<  198      [01-22-20 @ 08:27]  4.2   |  26.0  |  2.00        Ca     8.0     [01-22-20 @ 08:27]            Creatinine Trend:  SCr 2.00 [01-22 @ 08:27]  SCr 3.04 [01-21 @ 12:34]  SCr 2.71 [01-20 @ 14:54]  SCr 3.14 [01-16 @ 12:40]  SCr 3.50 [01-14 @ 16:22]    Urinalysis - [01-13-20 @ 14:52]      Color Brown / Appearance Slightly Turbid / SG 1.020 / pH 6.5      Gluc Negative / Ketone Trace  / Bili Small / Urobili Negative       Blood Large / Protein 100 / Leuk Est Moderate / Nitrite Negative      RBC TNTC / WBC 26-50 / Hyaline  / Gran  / Sq Epi  / Non Sq Epi Occasional / Bacteria Moderate      Iron 25, TIBC 154, %sat 16      [01-02-20 @ 09:39]  Ferritin 668      [01-02-20 @ 09:39]  PTH -- (Ca 8.0)      [01-02-20 @ 17:06]   119  PTH -- (Ca 9.1)      [05-30-19 @ 18:09]   107  Vitamin D (25OH) 25.2      [01-02-20 @ 17:06]  HbA1c 5.8      [12-26-19 @ 09:04]  TSH 1.10      [05-17-19 @ 12:21]    HBsAb 18.0      [12-26-19 @ 22:01]  HBsAg Nonreact      [12-26-19 @ 22:01]  HBcAb Nonreact      [12-26-19 @ 22:01]  HCV 0.20, Nonreact      [12-26-19 @ 22:01] [facility] DIVISION OF KIDNEY DISEASES AND HYPERTENSION -- FOLLOW UP NOTE  --------------------------------------------------------------------------------  Chief Complaint:  ESRD on HD    24 hour events/subjective:  Pt seen and examined  NAD  HD yesterday      PAST HISTORY  --------------------------------------------------------------------------------  No significant changes to PMH, PSH, FHx, SHx, unless otherwise noted    ALLERGIES & MEDICATIONS  --------------------------------------------------------------------------------  Allergies    ACE inhibitors (Other (Mild))    Intolerances    oxycodone (Sedation/Somnol)    Standing Inpatient Medications  albuterol/ipratropium for Nebulization. 3 milliLiter(s) Nebulizer every 6 hours  aspirin  chewable 81 milliGRAM(s) Oral daily  chlorhexidine 2% Cloths 1 Application(s) Topical <User Schedule>  chlorhexidine 4% Liquid 1 Application(s) Topical <User Schedule>  collagenase Ointment 1 Application(s) Topical daily  Dakins Solution - 1/2 Strength 1 Application(s) Topical daily  dextrose 5%. 1000 milliLiter(s) IV Continuous <Continuous>  dextrose 50% Injectable 12.5 Gram(s) IV Push once  dextrose 50% Injectable 25 Gram(s) IV Push once  dextrose 50% Injectable 25 Gram(s) IV Push once  epoetin sara Injectable 48507 Unit(s) IV Push <User Schedule>  heparin  Injectable 5000 Unit(s) SubCutaneous every 8 hours  insulin lispro (HumaLOG) corrective regimen sliding scale   SubCutaneous every 6 hours  lactulose Syrup 10 Gram(s) Oral two times a day  pantoprazole  Injectable 40 milliGRAM(s) IV Push two times a day    PRN Inpatient Medications  acetaminophen  Suppository .. 650 milliGRAM(s) Rectal every 6 hours PRN  dextrose 40% Gel 15 Gram(s) Oral once PRN  glucagon  Injectable 1 milliGRAM(s) IntraMuscular once PRN  morphine  - Injectable 2 milliGRAM(s) IV Push every 4 hours PRN  sodium chloride 0.9% lock flush 10 milliLiter(s) IV Push every 1 hour PRN      REVIEW OF SYSTEMS  --------------------------------------------------------------------------------  Gen: No weight changes, fatigue, fevers/chills, weakness  Skin: No rashes  Head/Eyes/Ears/Mouth: No headache; Normal hearing; Normal vision w/o blurriness; No sinus pain/discomfort, sore throat  Respiratory: No dyspnea, cough, wheezing, hemoptysis  CV: No chest pain, PND, orthopnea  GI: No abdominal pain, diarrhea, constipation, nausea, vomiting, melena, hematochezia  : No increased frequency, dysuria, hematuria, nocturia  MSK: No joint pain/swelling; no back pain; no edema  Neuro: No dizziness/lightheadedness, weakness, seizures, numbness, tingling  Heme: No easy bruising or bleeding  Endo: No heat/cold intolerance  Psych: No significant nervousness, anxiety, stress, depression    All other systems were reviewed and are negative, except as noted.    VITALS/PHYSICAL EXAM  --------------------------------------------------------------------------------  T(C): 36.7 (01-22-20 @ 08:20), Max: 36.9 (01-21-20 @ 23:35)  HR: 87 (01-22-20 @ 08:20) (87 - 96)  BP: 95/56 (01-22-20 @ 08:20) (95/56 - 142/73)  RR: 18 (01-22-20 @ 08:20) (18 - 18)  SpO2: 96% (01-22-20 @ 08:20) (95% - 100%)  Wt(kg): --        01-21-20 @ 07:01  -  01-22-20 @ 07:00  --------------------------------------------------------  IN: 610 mL / OUT: 1600 mL / NET: -990 mL      Physical Exam:  	Gen: NAD  	HEENT: supple neck  	Pulm: CTA B/L  	CV: RRR, S1S2; no rub  	Abd: +BS, soft, nontender, + PEG   	UE: Warm, intact strength  	LE: Warm, no edema  	Neuro: No focal deficits  	Psych: Normal affect and mood  	Skin: Warm, without rashes, Large sacral ulcer  	Vascular access: [facility]     LABS/STUDIES  --------------------------------------------------------------------------------              8.3    10.68 >-----------<  221      [01-22-20 @ 08:27]              26.4     130  |  93  |  28.0  ----------------------------<  198      [01-22-20 @ 08:27]  4.2   |  26.0  |  2.00        Ca     8.0     [01-22-20 @ 08:27]    Creatinine Trend:  SCr 2.00 [01-22 @ 08:27]  SCr 3.04 [01-21 @ 12:34]  SCr 2.71 [01-20 @ 14:54]  SCr 3.14 [01-16 @ 12:40]  SCr 3.50 [01-14 @ 16:22]    Urinalysis - [01-13-20 @ 14:52]      Color Brown / Appearance Slightly Turbid / SG 1.020 / pH 6.5      Gluc Negative / Ketone Trace  / Bili Small / Urobili Negative       Blood Large / Protein 100 / Leuk Est Moderate / Nitrite Negative      RBC TNTC / WBC 26-50 / Hyaline  / Gran  / Sq Epi  / Non Sq Epi Occasional / Bacteria Moderate      Iron 25, TIBC 154, %sat 16      [01-02-20 @ 09:39]  Ferritin 668      [01-02-20 @ 09:39]  PTH -- (Ca 8.0)      [01-02-20 @ 17:06]   119  PTH -- (Ca 9.1)      [05-30-19 @ 18:09]   107  Vitamin D (25OH) 25.2      [01-02-20 @ 17:06]  HbA1c 5.8      [12-26-19 @ 09:04]  TSH 1.10      [05-17-19 @ 12:21]    HBsAb 18.0      [12-26-19 @ 22:01]  HBsAg Nonreact      [12-26-19 @ 22:01]  HBcAb Nonreact      [12-26-19 @ 22:01]  HCV 0.20, Nonreact      [12-26-19 @ 22:01]

## 2020-01-22 NOTE — PROGRESS NOTE ADULT - ASSESSMENT
63 yo female with hx of CHFpEF, CAD, CKD, HTN, DM, cirrhosis, portal gastropathy, recurrent GI bleed, blind, sacral decub ulcer, presented to the ED from nursing home with encephalopathy, hypoxic resp failure requiring bipap, in ED found to have anemia with Hb 5 that responded to blood transfusions (hx of recurrent GI bleed for which multiple EGDs/colonoscopies have been performed, no further invasive work up as per GI). Also noted to have sepsis on admission that was secondary to PNA, possibly gram negative bacteria. Patient completed a course of zosyn. Also with KEITH on CKD now requiring HD via left IJ. Right chest tunnelled cath placed for access on 1/3/20. Patient failed s/s and is NPO. NGT feeding was started and family to make now opting for PEG. Had upper ext mapping US showed left brachial veins DVT around the catheter and superficial right basilic and left cephalic vein thrombosis which vascular recommends full anticoagulation if not contraindicated due to GI bleed on admission; discussed with GI to hold on full dose anticoagulation for now.      #Oropharyngeal dysphagia   - failed MBS abd swallow evaluation  - s/p PEG , tolerating feeding   - at goal of 60cc/hour    #Unstageable Sacral Decub -   surgery re-consulted for possible debridement.  Dress daily with dakin solution  Wound care consult   Surgery did debridement bedside 1/19/20 and 1/21/2020  clean with half strength dakin solution qd   -plan for OR debridement today 1/22 with surgical team  -family previously declining OR /sedation for pt, now as per surgery, family is agreeable    #Right pneumonia - suspected  aspiration  - treated initially abx restarted  for worsening infiltrate / CXR  - Abx completed     #bilateral superficial vein thrombosis on US doppler of upper ext and DVT on left brachial  vein around the catheter  - vascular rec anticoagulation due to GI bleed will hold off;  - poor candidate for full anticoagulation due to GI bleed on this admission and again in the past  - cont aspirin for now    #Acute on chronic diastolic CHF/ Acute hypoxic respiratory failure- improving   - cont HD for fluid removal   - Bipap as need.   -Supplemental oxygen via  NC comfortable     #Acute blood loss Anemia  - Hx of recurrent GI bleed? prior EGDs/ colonoscopies this year which were negative, prior CTA with +transverse colon bleed (Jan of 2019)  - Seen by GI on this admission no further work up   - repeat blood transfusion required for drop in hgb  - cont Protonix daily    #CASTAÑEDA Cirrhosis with portal gastropathy and AVMs  - change lactulose to standing through PEG with holding parameters  - c/w rifaximin  - cont PPI    #KEITH/ATN on CKD- started on new HD   - cont HD per nephrology   - nephrology follow up appreciated  - HD TTS schedule; next HD tomorrow  - Needs AVF, outpt HD set up  - Continue QUENTIN , PRBC transfusion prn with HD    #DM2- cont sliding scale insulin coverage   - Cont sliding scale insulin coverage     Eventual discharge to [facility]; palliative care evaluation prior, however at this time family not for DNR/DNI and want all possible treatment options.  Pt needs AVF and HD seat.

## 2020-01-22 NOTE — PROGRESS NOTE ADULT - SUBJECTIVE AND OBJECTIVE BOX
KUN ROCHA    [id number]    64y      Female    CC: Acute hypoxemic respiratory failure, GIB    INTERVAL HPI/OVERNIGHT EVENTS: Pt seen and examined. No acute events overnight.     REVIEW OF SYSTEMS:  denies n/v, fever, chills, cp, sob    Vital Signs Last 24 Hrs  T(C): 36.7 (22 Jan 2020 08:20), Max: 36.9 (21 Jan 2020 23:35)  T(F): 98 (22 Jan 2020 08:20), Max: 98.4 (21 Jan 2020 23:35)  HR: 87 (22 Jan 2020 08:20) (87 - 96)  BP: 95/56 (22 Jan 2020 08:20) (95/56 - 142/73)  BP(mean): --  RR: 18 (22 Jan 2020 08:20) (18 - 18)  SpO2: 96% (22 Jan 2020 08:20) (95% - 100%)    PHYSICAL EXAM:    GENERAL: NAD  HEENT: +EOMI  NECK: soft, supple  CHEST/LUNG: non-labored breathing on NC  HEART: S1S2+, Regular rate and rhythm; No murmurs, rubs, or gallops  ABDOMEN: Soft, Nontender, Nondistended; Bowel sounds present  SKIN: warm, dry    LABS:                        8.3    10.68 )-----------( 221      ( 22 Jan 2020 08:27 )             26.4     01-22    130<L>  |  93<L>  |  28.0<H>  ----------------------------<  198<H>  4.2   |  26.0  |  2.00<H>    Ca    8.0<L>      22 Jan 2020 08:27      MEDICATIONS  (STANDING):  albuterol/ipratropium for Nebulization. 3 milliLiter(s) Nebulizer every 6 hours  aspirin  chewable 81 milliGRAM(s) Oral daily  chlorhexidine 2% Cloths 1 Application(s) Topical <User Schedule>  chlorhexidine 4% Liquid 1 Application(s) Topical <User Schedule>  collagenase Ointment 1 Application(s) Topical daily  Dakins Solution - 1/2 Strength 1 Application(s) Topical daily  dextrose 5%. 1000 milliLiter(s) (50 mL/Hr) IV Continuous <Continuous>  dextrose 50% Injectable 12.5 Gram(s) IV Push once  dextrose 50% Injectable 25 Gram(s) IV Push once  dextrose 50% Injectable 25 Gram(s) IV Push once  epoetin sara Injectable 18339 Unit(s) IV Push <User Schedule>  heparin  Injectable 5000 Unit(s) SubCutaneous every 8 hours  insulin lispro (HumaLOG) corrective regimen sliding scale   SubCutaneous every 6 hours  lactulose Syrup 10 Gram(s) Oral two times a day  pantoprazole  Injectable 40 milliGRAM(s) IV Push two times a day    MEDICATIONS  (PRN):  acetaminophen  Suppository .. 650 milliGRAM(s) Rectal every 6 hours PRN Temp greater or equal to 38C (100.4F), Mild Pain (1 - 3)  dextrose 40% Gel 15 Gram(s) Oral once PRN Blood Glucose LESS THAN 70 milliGRAM(s)/deciLiter  glucagon  Injectable 1 milliGRAM(s) IntraMuscular once PRN Glucose <70 milliGRAM(s)/deciLiter  morphine  - Injectable 2 milliGRAM(s) IV Push every 4 hours PRN Severe Pain (7 - 10)  sodium chloride 0.9% lock flush 10 milliLiter(s) IV Push every 1 hour PRN Pre/post blood products, medications, blood draw, and to maintain line patency      RADIOLOGY & ADDITIONAL TESTS:

## 2020-01-22 NOTE — CONSULT NOTE ADULT - PROVIDER SPECIALTY LIST ADULT
Internal Medicine
Cardiology
Vascular Surgery
Nephrology
Neurology
Surgery
Vascular Surgery
Infectious Disease
Gastroenterology
Surgery
Palliative Care

## 2020-01-22 NOTE — CONSULT NOTE ADULT - SUBJECTIVE AND OBJECTIVE BOX
[facility]                                                        Office: [address] [address]                                                       Telephone: [phone]. Fax:[phone]    Patient is a 64y old  Female who presents with a chief complaint of Acute hypoxemic respiratory failure, GIB (2020 11:28)      HPI:  63 y/o F with a h/o dCHF, moderate pHTN, CAD, CKD, HTN, DM, cirrhosis, portal gastropathy, recurrent GIB, blind, large sacral wound, presents to the ED from NH with AMS, hypoxemia, and hypotension. Patient had large melenotic BM in the ED. H&H: 5.8/17. Lactate: 2.7. KEITH on CKD. Hyperkalemic. Volume resuscitation began with 3u PRBC. Patient minimally responsive and moaning in pain. UA(+) with thick tan urine in fontenot. Developed progressive respiratory distress and hypoxemia requiring NIPPV. (26 Dec 2019 03:58)      PAST MEDICAL & SURGICAL HISTORY:  Legally blind in right eye, as defined in [place]  CAD in native artery  Chronic CHF  DM (diabetes mellitus), type 2  GERD (gastroesophageal reflux disease)  Hyperlipidemia  HTN (hypertension)  Congestive heart failure, unspecified HF chronicity, unspecified heart failure type  Insomnia  Retinal detachment  CAD (coronary artery disease)  Glaucoma  Urinary retention  Peripheral neuropathy  GERD (gastroesophageal reflux disease)  Depression  Diabetes  Hypertension, unspecified type  Herniated disc, cervical  PAD (peripheral artery disease)  Legally blind  History of peripheral vascular disease  History of retinal detachment  History of CEA (carotid endarterectomy): Right  Hyperlipidemia  Glaucoma  Hypertension  Diabetes  H/O carotid endarterectomy  S/P CABG (coronary artery bypass graft)  No significant past surgical history  S/P CABG x 1  After cataract  Uveitic glaucoma of both eyes  Detached retina  Atherosclerosis of right carotid artery   delivery delivered      Allergies    ACE inhibitors (Other (Mild))    Intolerances    oxycodone (Sedation/Somnol)      Home Medications:  acetaminophen 325 mg oral tablet: 3 tab(s) orally every 6 hours, As Needed (27 Dec 2019 12:12)  Aquaphor topical ointment: Apply topically to B/L foot every day and evening shift for dry skin (27 Dec 2019 12:12)  Aspercreme with Lidocaine 4% topical cream: Apply topically to affected area once a day (1 patch to left posterior arm and 1 patch to lower back) for pain (27 Dec 2019 12:12)  atorvastatin 80 mg oral tablet: 1 tab(s) orally once a day (at bedtime) for HLD (27 Dec 2019 12:12)  budesonide 0.5 mg/2 mL inhalation suspension: 2 milliliter(s) inhaled 2 times a day for SOB (27 Dec 2019 12:12)  carvedilol 3.125 mg oral tablet: 1 tab(s) orally 2 times a day for hypertension (27 Dec 2019 12:12)  clopidogrel 75 mg oral tablet: 1 tab(s) orally once a day for blood clot prevention (27 Dec 2019 12:12)  Dakins Half Strength 0.25% topical solution: Apply to sacral wound topically every day and evening shift to pressure injury. Cleanse wound with dakins, pack with avani moistened with dakins, cover with foam and bg twice daily and as needed (27 Dec 2019 12:12)  ferrous sulfate 325 mg (65 mg elemental iron) oral tablet: 1 tab(s) orally once a day for anemia (27 Dec 2019 12:12)  gabapentin 300 mg oral capsule: 1 cap(s) orally 3 times a day for neuropathy (27 Dec 2019 12:12)  heparin 5000 units/mL injectable solution: 1 milliliter(s) subcutaneous every 8 hours for blood clot prevention (27 Dec 2019 12:12)  HumaLOG 100 units/mL subcutaneous solution: Inject SQ before meals for diabetes as per sliding scale if  101-150 = 2 units  150-200 = 4 units  201-250 = 6 units  251-300 = 8 units  301-350 = 10 units  351-400 = 12 units  BS&gt;401, Give 18 units and Call MD  BS&lt;60, Call MD (27 Dec 2019 12:12)  ipratropium-albuterol 0.5 mg-2.5 mg/3 mLinhalation solution: 3 milliliter(s) inhaled every 6 hours for SOB (27 Dec 2019 12:12)  isosorbide mononitrate 30 mg oral tablet, extended release: 1 tab(s) orally once a day for HTN (27 Dec 2019 12:12)  Lidoderm 5% topical film: Apply topically to lower back area and lower posterior arm once a day (27 Dec 2019 12:12)  Maalox Plus 225 mg-200 mg-25 mg/5 mL oral suspension: 30 milliliter(s) orally every 6 hours, As Needed - for heartburn (27 Dec 2019 12:12)  Melatonin 3 mg oral tablet: 1 tab(s) orally once a day (at bedtime) (27 Dec 2019 12:12)  menthol-zinc oxide 0.44%-20.625% topical ointment: Apply topically to inner buttocks topically every day and evening shift for skin excoriation (27 Dec 2019 12:12)  menthol-zinc oxide 0.44%-20.625% topical ointment: Apply topically to buttock wound 2 times a day (27 Dec 2019 12:12)  miconazole 2% topical cream: Apply topically to left breast wound  2 times a day (27 Dec 2019 12:12)  octreotide 20 mg intramuscular injection, extended release: 20 milligram(s) intramuscular every 28 days (27 Dec 2019 12:12)  pantoprazole 40 mg oral delayed release tablet: 1 tab(s) orally every 12 hours for GERD (27 Dec 2019 12:12)  traMADol 50 mg oral tablet: 1 tab(s) orally every 8 hours, As Needed - for moderate pain, for severe pain (27 Dec 2019 12:12)  Zosyn 3 g-0.375 g/50 mL intravenous solution: 3.375 gram(s) intravenous every 12 hours  started 12/21 x 7 days (27 Dec 2019 12:12)      MEDICATIONS  (STANDING):  albuterol/ipratropium for Nebulization. 3 milliLiter(s) Nebulizer every 6 hours  aspirin  chewable 81 milliGRAM(s) Oral daily  chlorhexidine 2% Cloths 1 Application(s) Topical <User Schedule>  chlorhexidine 4% Liquid 1 Application(s) Topical <User Schedule>  collagenase Ointment 1 Application(s) Topical daily  Dakins Solution - 1/2 Strength 1 Application(s) Topical daily  dextrose 5%. 1000 milliLiter(s) (50 mL/Hr) IV Continuous <Continuous>  dextrose 50% Injectable 12.5 Gram(s) IV Push once  dextrose 50% Injectable 25 Gram(s) IV Push once  dextrose 50% Injectable 25 Gram(s) IV Push once  epoetin sara Injectable 71003 Unit(s) IV Push <User Schedule>  heparin  Injectable 5000 Unit(s) SubCutaneous every 8 hours  insulin lispro (HumaLOG) corrective regimen sliding scale   SubCutaneous every 6 hours  lactulose Syrup 10 Gram(s) Oral two times a day  pantoprazole  Injectable 40 milliGRAM(s) IV Push two times a day    MEDICATIONS  (PRN):  acetaminophen  Suppository .. 650 milliGRAM(s) Rectal every 6 hours PRN Temp greater or equal to 38C (100.4F), Mild Pain (1 - 3)  dextrose 40% Gel 15 Gram(s) Oral once PRN Blood Glucose LESS THAN 70 milliGRAM(s)/deciLiter  glucagon  Injectable 1 milliGRAM(s) IntraMuscular once PRN Glucose <70 milliGRAM(s)/deciLiter  morphine  - Injectable 2 milliGRAM(s) IV Push every 4 hours PRN Severe Pain (7 - 10)  sodium chloride 0.9% lock flush 10 milliLiter(s) IV Push every 1 hour PRN Pre/post blood products, medications, blood draw, and to maintain line patency      FAMILY HISTORY:  No pertinent family history in first degree relatives  FH: type 2 diabetes: mom and dad      SOCIAL HISTORY: No tobacco/ No etoh/ No illicit drug use    PREVIOUS DIAGNOSTIC TESTING:      ECHO FINDINGS: < from: TTE Echo Complete w/Doppler (02.15.19 @ 15:29) >  Summary:   1. Technically suboptimal study.   2. Left ventricular ejection fraction, by visual estimation, is >75%.   3. Hyperdynamic global left ventricular systolic function.   4. There is mild concentric left ventricular hypertrophy.   5. Mild mitral annular calcification.   6. Moderate mitral valve regurgitation.   7. Thickening and calcification of the anterior and posterior mitral   valve leaflets.   8. Moderate-severe tricuspid regurgitation.   9. Estimated pulmonary artery systolic pressure is 55.7 mmHg assuming a   right atrial pressure of 5 mmHg, which is consistent with moderate   pulmonary hypertension.  10. Endocardial visualization was enhanced with intravenous echo contrast.    [id number] Andrew Torres MD, Electronically signed on 2/15/2019 at 6:14:21 PM       < end of copied text >    STRESS FINDINGS: < from: Nuclear Stress Test-Pharmacologic (18 @ 08:24) >  NUCLEAR FINDINGS:  Review of raw images demonstrated increased gut uptake.  Dense breast tissue noted anterolaterally.  LV cavity size  is normal.  There janae a medium sized mild defect involving  the basal to distal inferior segments on stress imaging.  Rest imaging demonstrated normalization of the inferior  defect.  Gated SPECT imaging demonstrated normal wall  motion with a post stress calculated LVEF of 67%.  ------------------------------------------------------------------------    IMPRESSIONS:  Positive study.    Single vessel, inferior ischemia involving the RCA  territory is present.  Intermediate risk scan.  Normal wall motion, LVEF 67%  ------------------------------------------------------------------------      ------------------------------------------------------------------------    Confirmed on  2018 - 12:15:31 by Em Drew   Cardiology Fellow: Galo Calixto FNP-C  ------------------------------------------------------------------------    < end of copied text >    CATHETERIZATION FINDINGS:  < from: Cardiac Cath Lab - Adult (08.21.15 @ 09:46) >  VENTRICLES: No LV gram was performed; however, a recent echocardiogram  demonstrated an EF of 55 %.  CORONARY VESSELS: The coronary circulation is right dominant.  LM:   --  Distal left main: There was a 50 % stenosis.  LAD:   --  LAD: There was a 100 % stenosis. With competitive flow noted.  CX:   --  Proximal circumflex: There was a 80 % stenosis.  --  OM1: There was a 80 % stenosis.  RI:   --  Ramus intermedius: There was a 60 % stenosis.  RCA:   --  Mid RCA: There was a 80 % stenosis.  --  Distal RCA: There was a 90 % stenosis.  --  RPDA: Small caliber, diffusely diseased vessel.  LEFT LOWER EXTREMITY VESSELS: Left lower extremity angiography reveals  severe atherosclerosis. Left superficial femoral: There was a diffuse 100  % stenosis. Left anterior tibial: Normal. Left posterior tibial: There was  a 100 % stenosis. Left peroneal: Normal.  COMPLICATIONS: No complications occurred during the cath lab visit.  SUMMARY:  LEFT LOWER EXTREMITY VESSELS: Left lower extremity angiography reveals  severe atherosclerosis.  Summary: Addendum 2015: Case reconfirmedto add Diagnostic Cath  procedures to Diagnostic report and add Dr Recio as Diagnostic  Cardiologist  DIAGNOSTIC IMPRESSIONS: Severe native circumflex and RCA disease. PCI  performed to circumflex. RCA too diffusley diseased with inadequate  outflow.  Severe L SFA disease.  DIAGNOSTIC RECOMMENDATIONS: Medical management of RCA disease.  Plan for staged left leg intervention in 2-3 weeks.  INTERVENTIONAL IMPRESSIONS: Severe native circumflex and RCA disease. PCI  performed to circumflex. RCA too diffusley diseased with inadequate  outflow.  Severe L SFA disease.  INTERVENTIONAL RECOMMENDATIONS: Medical management of RCA disease.  Plan for staged left leg intervention in 2-3 weeks.  Prepared and signed by  Los Recio MD    < end of copied text >    REVIEW OF SYSTEMS:  CONSTITUTIONAL: [-]fever   [-] weight loss   [-] fatigue  EYES: [-]  eye pain   [-] visual disturbances      [-]  discharge  ENMT:  [-]  difficulty hearing,   [-]  tinnitus   [-] vertigo    [-]  sinus or throat pain  NECK: [-]  pain or stiffness  RESPIRATORY: [-]  cough 	[-] wheezing 	[-]  hemoptysis 		[-]   Shortness of Breath  CARDIOVASCULAR: [-]  chest pain	[-] palpitations		[-]  passing out 		[-] dizziness 	[-]  leg swelling  		[-]  PND 	[-] orthopnea  GASTROINTESTINAL: [-]  abdominal pain		[-]nausea	[-] vomiting	[-]  hematemesis 	[-]  diarrhea  	[-] constipation 		[-]  melena 	[-] hematochezia.  GENITOURINARY: [-] dysuria	[-] frequency	[-] hematuria	[-]  incontinence  NEUROLOGICAL: [-]  headaches		[-] memory loss 	[-]  loss of strength  			[-]  numbness/tingling 	[-]  tremors  SKIN: [-]  itching 	[-] rashes 	[-]  lesions   LYMPH Nodes: [-] enlarged glands  ENDOCRINE: [-] heat or cold intolerance 	[-]   hair loss  MUSCULOSKELETAL: [-] joint pain  [-] joint swelling	[-]  muscle, back, or extremity pain  PSYCHIATRIC: [-]  depression	[-] anxiety	[-] mood swings		[-]  difficulty sleeping   HEME: [-]  easy bruising 	[-]  bleeding   ALLERY AND IMMUNOLOGIC: [-]  hives or eczema	      Vital Signs Last 24 Hrs  T(C): 36.8 (2020 15:55), Max: 36.9 (2020 23:35)  T(F): 98.2 (2020 15:55), Max: 98.4 (2020 23:35)  HR: 90 (2020 15:55) (77 - 96)  BP: 98/58 (2020 15:55) (95/56 - 102/53)  BP(mean): --  RR: 18 (2020 15:55) (18 - 18)  SpO2: 98% (2020 15:55) (95% - 98%)  Daily     Daily   I&O's Detail    2020 07:01  -  2020 07:00  --------------------------------------------------------  IN:    Enteral Tube Flush: 250 mL    Nepro: 360 mL  Total IN: 610 mL    OUT:    Other: 1600 mL  Total OUT: 1600 mL    Total NET: -990 mL            PHYSICAL EXAM:  Appearance: Normal, well nourished, NAD	  HEENT:   Normal oral mucosa, PERRL, EOMI, sclera non-icteric	  Lymphatic: No cervical lymphadenopathy  Cardiovascular: Normal S1 S2, No JVD, No cardiac murmurs, No carotid bruits, No peripheral edema  Respiratory: Lungs clear to auscultation	  Psychiatry: A & O x 3, Mood & affect appropriate  Gastrointestinal:  Soft, Non-tender, + BS, no bruits	  Skin: No rashes, No ecchymoses, No cyanosis, Dry  Neurologic: Grossly non-focal with full strength in all four extremities  Extremities: Normal range of motion, No clubbing, cyanosis or edema  Vascular: Peripheral pulses palpable 2+ bilaterally, warm    INTERPRETATION OF TELEMETRY:    ECG (tracing reviewed by me):    LABS:                        8.3    10.68 )-----------( 221      ( 2020 08:27 )             26.4     01-22    130<L>  |  93<L>  |  28.0<H>  ----------------------------<  198<H>  4.2   |  26.0  |  2.00<H>    Ca    8.0<L>      2020 08:27              BNP    RADIOLOGY & ADDITIONAL STUDIES:  CXR (image reviewed by me): [facility]                                                        Office: [address] [address]                                                       Telephone: [phone]. Fax:[phone]    Patient is a 64y old  Female who presents with a chief complaint of Acute hypoxemic respiratory failure, GIB (2020 11:28)      HPI: 63 y/o female with hx of CAD s/p CABG with LIMA to LAD in , s/p cardiomems 2019, PVD, pulmonary hypertension, CKD, blind, hypertension, diabetes mellitus, recurrent GI bleed, blind, sacral decub presented with hypoxia, encephalopathy, GI bleed, acute respiratory failure.  Hospital course complicated with pneumonia with sepsis, acute renal failure now on HD, failed swallow eval now with PEG tube.  She has a large sacral decub which is planned to have debridement in the OR.  Cardiology was asked to consult for perioperative risk stratification.  Patient seen and examined, she has no specific complaints. She tells me she does not want surgery.     PAST MEDICAL & SURGICAL HISTORY:  Legally blind in right eye, as defined in [place]  CAD in native artery  Chronic CHF  DM (diabetes mellitus), type 2  GERD (gastroesophageal reflux disease)  Hyperlipidemia  HTN (hypertension)  Congestive heart failure, unspecified HF chronicity, unspecified heart failure type  Insomnia  Retinal detachment  CAD (coronary artery disease)  Glaucoma  Urinary retention  Peripheral neuropathy  GERD (gastroesophageal reflux disease)  Depression  Diabetes  Hypertension, unspecified type  Herniated disc, cervical  PAD (peripheral artery disease)  Legally blind  History of peripheral vascular disease  History of retinal detachment  History of CEA (carotid endarterectomy): Right  Hyperlipidemia  Glaucoma  Hypertension  Diabetes  H/O carotid endarterectomy  S/P CABG (coronary artery bypass graft)  No significant past surgical history  S/P CABG x 1  After cataract  Uveitic glaucoma of both eyes  Detached retina  Atherosclerosis of right carotid artery   delivery delivered      Allergies    ACE inhibitors (Other (Mild))    Intolerances    oxycodone (Sedation/Somnol)      Home Medications:  acetaminophen 325 mg oral tablet: 3 tab(s) orally every 6 hours, As Needed (27 Dec 2019 12:12)  Aquaphor topical ointment: Apply topically to B/L foot every day and evening shift for dry skin (27 Dec 2019 12:12)  Aspercreme with Lidocaine 4% topical cream: Apply topically to affected area once a day (1 patch to left posterior arm and 1 patch to lower back) for pain (27 Dec 2019 12:12)  atorvastatin 80 mg oral tablet: 1 tab(s) orally once a day (at bedtime) for HLD (27 Dec 2019 12:12)  budesonide 0.5 mg/2 mL inhalation suspension: 2 milliliter(s) inhaled 2 times a day for SOB (27 Dec 2019 12:12)  carvedilol 3.125 mg oral tablet: 1 tab(s) orally 2 times a day for hypertension (27 Dec 2019 12:12)  clopidogrel 75 mg oral tablet: 1 tab(s) orally once a day for blood clot prevention (27 Dec 2019 12:12)  Dakins Half Strength 0.25% topical solution: Apply to sacral wound topically every day and evening shift to pressure injury. Cleanse wound with dakins, pack with avani moistened with dakins, cover with foam and bg twice daily and as needed (27 Dec 2019 12:12)  ferrous sulfate 325 mg (65 mg elemental iron) oral tablet: 1 tab(s) orally once a day for anemia (27 Dec 2019 12:12)  gabapentin 300 mg oral capsule: 1 cap(s) orally 3 times a day for neuropathy (27 Dec 2019 12:12)  heparin 5000 units/mL injectable solution: 1 milliliter(s) subcutaneous every 8 hours for blood clot prevention (27 Dec 2019 12:12)  HumaLOG 100 units/mL subcutaneous solution: Inject SQ before meals for diabetes as per sliding scale if  101-150 = 2 units  150-200 = 4 units  201-250 = 6 units  251-300 = 8 units  301-350 = 10 units  351-400 = 12 units  BS&gt;401, Give 18 units and Call MD  BS&lt;60, Call MD (27 Dec 2019 12:12)  ipratropium-albuterol 0.5 mg-2.5 mg/3 mLinhalation solution: 3 milliliter(s) inhaled every 6 hours for SOB (27 Dec 2019 12:12)  isosorbide mononitrate 30 mg oral tablet, extended release: 1 tab(s) orally once a day for HTN (27 Dec 2019 12:12)  Lidoderm 5% topical film: Apply topically to lower back area and lower posterior arm once a day (27 Dec 2019 12:12)  Maalox Plus 225 mg-200 mg-25 mg/5 mL oral suspension: 30 milliliter(s) orally every 6 hours, As Needed - for heartburn (27 Dec 2019 12:12)  Melatonin 3 mg oral tablet: 1 tab(s) orally once a day (at bedtime) (27 Dec 2019 12:12)  menthol-zinc oxide 0.44%-20.625% topical ointment: Apply topically to inner buttocks topically every day and evening shift for skin excoriation (27 Dec 2019 12:12)  menthol-zinc oxide 0.44%-20.625% topical ointment: Apply topically to buttock wound 2 times a day (27 Dec 2019 12:12)  miconazole 2% topical cream: Apply topically to left breast wound  2 times a day (27 Dec 2019 12:12)  octreotide 20 mg intramuscular injection, extended release: 20 milligram(s) intramuscular every 28 days (27 Dec 2019 12:12)  pantoprazole 40 mg oral delayed release tablet: 1 tab(s) orally every 12 hours for GERD (27 Dec 2019 12:12)  traMADol 50 mg oral tablet: 1 tab(s) orally every 8 hours, As Needed - for moderate pain, for severe pain (27 Dec 2019 12:12)  Zosyn 3 g-0.375 g/50 mL intravenous solution: 3.375 gram(s) intravenous every 12 hours  started 12/21 x 7 days (27 Dec 2019 12:12)      MEDICATIONS  (STANDING):  albuterol/ipratropium for Nebulization. 3 milliLiter(s) Nebulizer every 6 hours  aspirin  chewable 81 milliGRAM(s) Oral daily  chlorhexidine 2% Cloths 1 Application(s) Topical <User Schedule>  chlorhexidine 4% Liquid 1 Application(s) Topical <User Schedule>  collagenase Ointment 1 Application(s) Topical daily  Dakins Solution - 1/2 Strength 1 Application(s) Topical daily  dextrose 5%. 1000 milliLiter(s) (50 mL/Hr) IV Continuous <Continuous>  dextrose 50% Injectable 12.5 Gram(s) IV Push once  dextrose 50% Injectable 25 Gram(s) IV Push once  dextrose 50% Injectable 25 Gram(s) IV Push once  epoetin sara Injectable 31566 Unit(s) IV Push <User Schedule>  heparin  Injectable 5000 Unit(s) SubCutaneous every 8 hours  insulin lispro (HumaLOG) corrective regimen sliding scale   SubCutaneous every 6 hours  lactulose Syrup 10 Gram(s) Oral two times a day  pantoprazole  Injectable 40 milliGRAM(s) IV Push two times a day    MEDICATIONS  (PRN):  acetaminophen  Suppository .. 650 milliGRAM(s) Rectal every 6 hours PRN Temp greater or equal to 38C (100.4F), Mild Pain (1 - 3)  dextrose 40% Gel 15 Gram(s) Oral once PRN Blood Glucose LESS THAN 70 milliGRAM(s)/deciLiter  glucagon  Injectable 1 milliGRAM(s) IntraMuscular once PRN Glucose <70 milliGRAM(s)/deciLiter  morphine  - Injectable 2 milliGRAM(s) IV Push every 4 hours PRN Severe Pain (7 - 10)  sodium chloride 0.9% lock flush 10 milliLiter(s) IV Push every 1 hour PRN Pre/post blood products, medications, blood draw, and to maintain line patency      FAMILY HISTORY:  No pertinent family history in first degree relatives  FH: type 2 diabetes: mom and dad      SOCIAL HISTORY: No tobacco/ No etoh/ No illicit drug use    PREVIOUS DIAGNOSTIC TESTING:      ECHO FINDINGS: < from: TTE Echo Complete w/Doppler (02.15.19 @ 15:29) >  Summary:   1. Technically suboptimal study.   2. Left ventricular ejection fraction, by visual estimation, is >75%.   3. Hyperdynamic global left ventricular systolic function.   4. There is mild concentric left ventricular hypertrophy.   5. Mild mitral annular calcification.   6. Moderate mitral valve regurgitation.   7. Thickening and calcification of the anterior and posterior mitral   valve leaflets.   8. Moderate-severe tricuspid regurgitation.   9. Estimated pulmonary artery systolic pressure is 55.7 mmHg assuming a   right atrial pressure of 5 mmHg, which is consistent with moderate   pulmonary hypertension.  10. Endocardial visualization was enhanced with intravenous echo contrast.    [id number] Andrew Torres MD, Electronically signed on 2/15/2019 at 6:14:21 PM       < end of copied text >    STRESS FINDINGS: < from: Nuclear Stress Test-Pharmacologic (18 @ 08:24) >  NUCLEAR FINDINGS:  Review of raw images demonstrated increased gut uptake.  Dense breast tissue noted anterolaterally.  LV cavity size  is normal.  There janae a medium sized mild defect involving  the basal to distal inferior segments on stress imaging.  Rest imaging demonstrated normalization of the inferior  defect.  Gated SPECT imaging demonstrated normal wall  motion with a post stress calculated LVEF of 67%.  ------------------------------------------------------------------------    IMPRESSIONS:  Positive study.    Single vessel, inferior ischemia involving the RCA  territory is present.  Intermediate risk scan.  Normal wall motion, LVEF 67%  ------------------------------------------------------------------------      ------------------------------------------------------------------------    Confirmed on  2018 - 12:15:31 by Em Drew   Cardiology Fellow: Galo Calixto FNP-C  ------------------------------------------------------------------------    < end of copied text >    CATHETERIZATION FINDINGS:  < from: Cardiac Cath Lab - Adult (08.21.15 @ 09:46) >  VENTRICLES: No LV gram was performed; however, a recent echocardiogram  demonstrated an EF of 55 %.  CORONARY VESSELS: The coronary circulation is right dominant.  LM:   --  Distal left main: There was a 50 % stenosis.  LAD:   --  LAD: There was a 100 % stenosis. With competitive flow noted.  CX:   --  Proximal circumflex: There was a 80 % stenosis.  --  OM1: There was a 80 % stenosis.  RI:   --  Ramus intermedius: There was a 60 % stenosis.  RCA:   --  Mid RCA: There was a 80 % stenosis.  --  Distal RCA: There was a 90 % stenosis.  --  RPDA: Small caliber, diffusely diseased vessel.  LEFT LOWER EXTREMITY VESSELS: Left lower extremity angiography reveals  severe atherosclerosis. Left superficial femoral: There was a diffuse 100  % stenosis. Left anterior tibial: Normal. Left posterior tibial: There was  a 100 % stenosis. Left peroneal: Normal.  COMPLICATIONS: No complications occurred during the cath lab visit.  SUMMARY:  LEFT LOWER EXTREMITY VESSELS: Left lower extremity angiography reveals  severe atherosclerosis.  Summary: Addendum 2015: Case reconfirmedto add Diagnostic Cath  procedures to Diagnostic report and add Dr Recio as Diagnostic  Cardiologist  DIAGNOSTIC IMPRESSIONS: Severe native circumflex and RCA disease. PCI  performed to circumflex. RCA too diffusley diseased with inadequate  outflow.  Severe L SFA disease.  DIAGNOSTIC RECOMMENDATIONS: Medical management of RCA disease.  Plan for staged left leg intervention in 2-3 weeks.  INTERVENTIONAL IMPRESSIONS: Severe native circumflex and RCA disease. PCI  performed to circumflex. RCA too diffusley diseased with inadequate  outflow.  Severe L SFA disease.  INTERVENTIONAL RECOMMENDATIONS: Medical management of RCA disease.  Plan for staged left leg intervention in 2-3 weeks.  Prepared and signed by  Los Recio MD    < end of copied text >    REVIEW OF SYSTEMS: per HPI  CONSTITUTIONAL: [-]fever   [-] weight loss   [-] fatigue  EYES: [-]  eye pain   [-] visual disturbances      [-]  discharge  ENMT:  [-]  difficulty hearing,   [-]  tinnitus   [-] vertigo    [-]  sinus or throat pain  NECK: [-]  pain or stiffness  RESPIRATORY: [-]  cough 	[-] wheezing 	[-]  hemoptysis 		[-]   Shortness of Breath  CARDIOVASCULAR: [-]  chest pain	[-] palpitations		[-]  passing out 		[-] dizziness 	[-]  leg swelling  		[-]  PND 	[-] orthopnea  GASTROINTESTINAL: [-]  abdominal pain		[-]nausea	[-] vomiting	[-]  hematemesis 	[-]  diarrhea  	[-] constipation 		[-]  melena 	[-] hematochezia.  GENITOURINARY: [-] dysuria	[-] frequency	[-] hematuria	[-]  incontinence  NEUROLOGICAL: [-]  headaches		[-] memory loss 	[-]  loss of strength  			[-]  numbness/tingling 	[-]  tremors  SKIN: [-]  itching 	[-] rashes 	[-]  lesions   LYMPH Nodes: [-] enlarged glands  ENDOCRINE: [-] heat or cold intolerance 	[-]   hair loss  MUSCULOSKELETAL: [-] joint pain  [-] joint swelling	[-]  muscle, back, or extremity pain  PSYCHIATRIC: [-]  depression	[-] anxiety	[-] mood swings		[-]  difficulty sleeping   HEME: [-]  easy bruising 	[-]  bleeding   ALLERY AND IMMUNOLOGIC: [-]  hives or eczema	      Vital Signs Last 24 Hrs  T(C): 36.8 (2020 15:55), Max: 36.9 (2020 23:35)  T(F): 98.2 (2020 15:55), Max: 98.4 (2020 23:35)  HR: 90 (2020 15:55) (77 - 96)  BP: 98/58 (2020 15:55) (95/56 - 102/53)  BP(mean): --  RR: 18 (2020 15:55) (18 - 18)  SpO2: 98% (2020 15:55) (95% - 98%)  Daily     Daily   I&O's Detail    2020 07:01  -  2020 07:00  --------------------------------------------------------  IN:    Enteral Tube Flush: 250 mL    Nepro: 360 mL  Total IN: 610 mL    OUT:    Other: 1600 mL  Total OUT: 1600 mL    Total NET: -990 mL            PHYSICAL EXAM:  Appearance: NAD	  HEENT:   Normal oral mucosa, no JVD  Lymphatic: No cervical lymphadenopathy  Cardiovascular: Normal S1 S2, No JVD, III/VI systolic murmur, No carotid bruits, poor distal pulses  Respiratory: Lungs clear to auscultation	  Psychiatry:  Mood & affect appropriate  Gastrointestinal:  Soft, Non-tender, + BS, no bruits; + PEG tube	  Skin: No rashes, No ecchymoses, No cyanosis, Dry  Neurologic: Grossly non-focal with full strength in all four extremities  Extremities: Normal range of motion, No clubbing, cyanosis or edema  Vascular: Peripheral pulses palpable 2+ bilaterally, warm    INTERPRETATION OF TELEMETRY:    ECG (tracing reviewed by me): sinus rhythm, PVCs, poor R wave progression, nonspecific T wave abnormalities    LABS:                        8.3    10.68 )-----------( 221      ( 2020 08:27 )             26.4     -    130<L>  |  93<L>  |  28.0<H>  ----------------------------<  198<H>  4.2   |  26.0  |  2.00<H>    Ca    8.0<L>      2020 08:27    RADIOLOGY & ADDITIONAL STUDIES:  CXR (image reviewed by me): < from: Xray Chest 1 View- PORTABLE-Urgent (20 @ 13:34) >  IMPRESSION:    Right mid to lower lung infiltrate unchanged. Stable small right pleural effusion.    Marked cardiomegaly unchanged.    Right dialysis catheter and sternotomy wires reidentified.    < end of copied text >

## 2020-01-22 NOTE — CONSULT NOTE ADULT - REASON FOR ADMISSION
Acute hypoxemic respiratory failure, GIB

## 2020-01-22 NOTE — PROGRESS NOTE ADULT - SUBJECTIVE AND OBJECTIVE BOX
SUBJECTIVE:  No acute overnight events. Vital signs stable. Seen and examined at bedside found resting comfortably Patient with pain well controlled. Denies nausea, vomiting, fever, chills, chest pain, shortness of breath, or any new or concerning symptoms. Going to OR today.     MEDICATIONS  (STANDING):  albuterol/ipratropium for Nebulization. 3 milliLiter(s) Nebulizer every 6 hours  aspirin  chewable 81 milliGRAM(s) Oral daily  chlorhexidine 2% Cloths 1 Application(s) Topical <User Schedule>  chlorhexidine 4% Liquid 1 Application(s) Topical <User Schedule>  collagenase Ointment 1 Application(s) Topical daily  Dakins Solution - 1/2 Strength 1 Application(s) Topical daily  dextrose 5%. 1000 milliLiter(s) (50 mL/Hr) IV Continuous <Continuous>  dextrose 50% Injectable 12.5 Gram(s) IV Push once  dextrose 50% Injectable 25 Gram(s) IV Push once  dextrose 50% Injectable 25 Gram(s) IV Push once  epoetin sara Injectable 50370 Unit(s) IV Push <User Schedule>  heparin  Injectable 5000 Unit(s) SubCutaneous every 8 hours  insulin lispro (HumaLOG) corrective regimen sliding scale   SubCutaneous every 6 hours  lactulose Syrup 10 Gram(s) Oral two times a day  pantoprazole  Injectable 40 milliGRAM(s) IV Push two times a day    MEDICATIONS  (PRN):  acetaminophen  Suppository .. 650 milliGRAM(s) Rectal every 6 hours PRN Temp greater or equal to 38C (100.4F), Mild Pain (1 - 3)  dextrose 40% Gel 15 Gram(s) Oral once PRN Blood Glucose LESS THAN 70 milliGRAM(s)/deciLiter  glucagon  Injectable 1 milliGRAM(s) IntraMuscular once PRN Glucose <70 milliGRAM(s)/deciLiter  sodium chloride 0.9% lock flush 10 milliLiter(s) IV Push every 1 hour PRN Pre/post blood products, medications, blood draw, and to maintain line patency      Vital Signs Last 24 Hrs  T(C): 36.9 (21 Jan 2020 23:35), Max: 36.9 (21 Jan 2020 23:35)  T(F): 98.4 (21 Jan 2020 23:35), Max: 98.4 (21 Jan 2020 23:35)  HR: 96 (21 Jan 2020 23:35) (87 - 96)  BP: 102/53 (21 Jan 2020 23:35) (101/51 - 142/73)  BP(mean): --  RR: 18 (21 Jan 2020 23:35) (18 - 19)  SpO2: 95% (21 Jan 2020 23:35) (94% - 100%)    PE  Constitutional: patient resting comfortably in bed, in no acute distress  HEENT: EOMI, no active drainage or redness  Neck: Full ROM without pain  Respiratory: respirations are unlabored, no accessory muscle use, no conversational dyspnea  Cardiovascular: regular rate & rhythm  Gastrointestinal: Abdomen soft, non-tender, non-distended  : Large sacral ulcer with some areas of fibrinous exudate, No purulent drainage or surrounding cellulitis to skin edges. Depth beyond subcutaneous fat, infiltrating muscle/bone.  Musculoskeletal: Baseline paraplegia      I&O's Detail    20 Jan 2020 07:01  -  21 Jan 2020 07:00  --------------------------------------------------------  IN:    Enteral Tube Flush: 500 mL    Nepro: 720 mL  Total IN: 1220 mL    OUT:  Total OUT: 0 mL    Total NET: 1220 mL      21 Jan 2020 07:01  -  22 Jan 2020 02:23  --------------------------------------------------------  IN:    Enteral Tube Flush: 250 mL    Nepro: 360 mL  Total IN: 610 mL    OUT:    Other: 1600 mL  Total OUT: 1600 mL    Total NET: -990 mL          LABS:                        7.9    11.48 )-----------( 356      ( 21 Jan 2020 12:34 )             23.5     01-21    131<L>  |  94<L>  |  46.0<H>  ----------------------------<  280<H>  3.7   |  27.0  |  3.04<H>    Ca    7.8<L>      21 Jan 2020 12:34            RADIOLOGY & ADDITIONAL STUDIES:

## 2020-01-22 NOTE — PROGRESS NOTE ADULT - ASSESSMENT
1. ESRD on HD  2. Right Pneumonia   3. DM II  4. GI Bleed  5. Acute blood loss anemia  6. Acute on Chronic Diastolic Heart Failure  7. Dysphagia  8. Unstageable sacral decubitus      HD in am - orders placed  UF w/HD  ABX completed  FSBS w/ISS  Transfuse PRN  Failed MBS and swallow evaluation  s/p PEG, tolerating feeding  - at goal of 60cc/hour  Wound care on board  S/P surgical debridement 1/19/2020  On PPI , Lactulose  &  Rifaximin  Continue current management 1. ESRD on HD  2. Right Pneumonia   3. DM II  4. GI Bleed  5. Acute blood loss anemia  6. Acute on Chronic Diastolic Heart Failure  7. Dysphagia  8. Unstageable sacral decubitus      HD in am - orders placed  UF w/HD  ABX completed  FSBS w/ISS  Transfuse PRN  Failed MBS and swallow evaluation  s/p PEG, tolerating feeding  - at goal of 60cc/hour  Wound care on board  S/P surgical debridement 1/19/2020  On PPI , Lactulose  &  Rifaximin  Continue current management    I was Physically Present for the key portions of the Evaluation & management ( E/M ) Service provided.    I agree with the above History  , Physical examination & Treatment Plans,    I have Reviewed , Modified or appended where appropriate,

## 2020-01-22 NOTE — CONSULT NOTE ADULT - ASSESSMENT
65 y/o female with hx of CAD s/p CABG with LIMA to LAD in 2015, s/p cardiomems 5/2019, PVD, pulmonary hypertension, CKD, blind, hypertension, diabetes mellitus, recurrent GI bleed, blind, sacral decub presented with hypoxia, encephalopathy, GI bleed, acute respiratory failure.  Hospital course complicated with pneumonia with sepsis, acute renal failure now on HD, failed swallow eval now with PEG tube.  She has a large sacral decub which is planned to have debridement in the OR.  Cardiology was asked to consult for perioperative risk stratification.  Patient seen and examined, she has no specific complaints. She tells me she does not want surgery.       # perioperative risk stratification - currently no active cardiac conditions which would contraindicate procedure.  Overall, intermediate cardiac risk for intermediate risk procedure. Will check cardiomems.    # CAD s/p CABG with LIMA to LAD - no symptoms.  Ok to continue aspirin.  May hold if actively re-bleeds.  Resume carvedilol once BP able to tolerate.    # HFPEF s/p cardiomems implant - euvolemic on exam  # pulmonary hypertension - stable, on nasal cannula  # hypertension -BP soft    Thank you for allowing me to participate in care of your patient.   Please call as needed. Admit/Transfer to Inpatient Psychiatry

## 2020-01-22 NOTE — PROGRESS NOTE ADULT - ASSESSMENT
65yo F wit paraplegia presented from nursing home for Stage III-IV sacral decubitus ulcer being followed by surgery for wound care. Going to OR today for debridement    - OR today for wound debridement  - Offloading bed and boots recommended  - Patient should be on a [place] bed, with offloading

## 2020-01-23 ENCOUNTER — TRANSCRIPTION ENCOUNTER (OUTPATIENT)
Age: 65
End: 2020-01-23

## 2020-01-23 LAB
ANION GAP SERPL CALC-SCNC: 13 MMOL/L — SIGNIFICANT CHANGE UP (ref 5–17)
BUN SERPL-MCNC: 38 MG/DL — HIGH (ref 8–20)
CALCIUM SERPL-MCNC: 8.2 MG/DL — LOW (ref 8.6–10.2)
CHLORIDE SERPL-SCNC: 96 MMOL/L — LOW (ref 98–107)
CO2 SERPL-SCNC: 27 MMOL/L — SIGNIFICANT CHANGE UP (ref 22–29)
CREAT SERPL-MCNC: 2.68 MG/DL — HIGH (ref 0.5–1.3)
GLUCOSE BLDC GLUCOMTR-MCNC: 118 MG/DL — HIGH (ref 70–99)
GLUCOSE BLDC GLUCOMTR-MCNC: 121 MG/DL — HIGH (ref 70–99)
GLUCOSE BLDC GLUCOMTR-MCNC: 135 MG/DL — HIGH (ref 70–99)
GLUCOSE BLDC GLUCOMTR-MCNC: 142 MG/DL — HIGH (ref 70–99)
GLUCOSE SERPL-MCNC: 139 MG/DL — HIGH (ref 70–99)
HCT VFR BLD CALC: 23.6 % — LOW (ref 34.5–45)
HGB BLD-MCNC: 7.9 G/DL — LOW (ref 11.5–15.5)
MCHC RBC-ENTMCNC: 28.4 PG — SIGNIFICANT CHANGE UP (ref 27–34)
MCHC RBC-ENTMCNC: 33.5 GM/DL — SIGNIFICANT CHANGE UP (ref 32–36)
MCV RBC AUTO: 84.9 FL — SIGNIFICANT CHANGE UP (ref 80–100)
PLATELET # BLD AUTO: 409 K/UL — HIGH (ref 150–400)
POTASSIUM SERPL-MCNC: 3.6 MMOL/L — SIGNIFICANT CHANGE UP (ref 3.5–5.3)
POTASSIUM SERPL-SCNC: 3.6 MMOL/L — SIGNIFICANT CHANGE UP (ref 3.5–5.3)
RBC # BLD: 2.78 M/UL — LOW (ref 3.8–5.2)
RBC # FLD: 19.9 % — HIGH (ref 10.3–14.5)
SODIUM SERPL-SCNC: 136 MMOL/L — SIGNIFICANT CHANGE UP (ref 135–145)
WBC # BLD: 9.87 K/UL — SIGNIFICANT CHANGE UP (ref 3.8–10.5)
WBC # FLD AUTO: 9.87 K/UL — SIGNIFICANT CHANGE UP (ref 3.8–10.5)

## 2020-01-23 PROCEDURE — 99232 SBSQ HOSP IP/OBS MODERATE 35: CPT

## 2020-01-23 PROCEDURE — 99231 SBSQ HOSP IP/OBS SF/LOW 25: CPT

## 2020-01-23 PROCEDURE — 11043 DBRDMT MUSC&/FSCA 1ST 20/<: CPT

## 2020-01-23 PROCEDURE — 90937 HEMODIALYSIS REPEATED EVAL: CPT

## 2020-01-23 RX ORDER — CHLORHEXIDINE GLUCONATE 213 G/1000ML
1 SOLUTION TOPICAL
Refills: 0 | Status: DISCONTINUED | OUTPATIENT
Start: 2020-01-23 | End: 2020-02-03

## 2020-01-23 RX ORDER — SODIUM CHLORIDE 9 MG/ML
10 INJECTION INTRAMUSCULAR; INTRAVENOUS; SUBCUTANEOUS
Refills: 0 | Status: DISCONTINUED | OUTPATIENT
Start: 2020-01-23 | End: 2020-02-03

## 2020-01-23 RX ORDER — SODIUM HYPOCHLORITE 0.125 %
1 SOLUTION, NON-ORAL MISCELLANEOUS DAILY
Refills: 0 | Status: DISCONTINUED | OUTPATIENT
Start: 2020-01-23 | End: 2020-02-03

## 2020-01-23 RX ORDER — LACTULOSE 10 G/15ML
10 SOLUTION ORAL
Refills: 0 | Status: DISCONTINUED | OUTPATIENT
Start: 2020-01-23 | End: 2020-02-03

## 2020-01-23 RX ORDER — PANTOPRAZOLE SODIUM 20 MG/1
40 TABLET, DELAYED RELEASE ORAL
Refills: 0 | Status: DISCONTINUED | OUTPATIENT
Start: 2020-01-23 | End: 2020-02-03

## 2020-01-23 RX ORDER — CHLORHEXIDINE GLUCONATE 213 G/1000ML
1 SOLUTION TOPICAL
Refills: 0 | Status: DISCONTINUED | OUTPATIENT
Start: 2020-01-23 | End: 2020-01-24

## 2020-01-23 RX ORDER — SODIUM CHLORIDE 9 MG/ML
1000 INJECTION, SOLUTION INTRAVENOUS
Refills: 0 | Status: DISCONTINUED | OUTPATIENT
Start: 2020-01-23 | End: 2020-01-24

## 2020-01-23 RX ORDER — INSULIN LISPRO 100/ML
VIAL (ML) SUBCUTANEOUS EVERY 6 HOURS
Refills: 0 | Status: DISCONTINUED | OUTPATIENT
Start: 2020-01-23 | End: 2020-02-03

## 2020-01-23 RX ORDER — DEXTROSE 50 % IN WATER 50 %
25 SYRINGE (ML) INTRAVENOUS ONCE
Refills: 0 | Status: DISCONTINUED | OUTPATIENT
Start: 2020-01-23 | End: 2020-02-03

## 2020-01-23 RX ORDER — ASPIRIN/CALCIUM CARB/MAGNESIUM 324 MG
81 TABLET ORAL DAILY
Refills: 0 | Status: DISCONTINUED | OUTPATIENT
Start: 2020-01-23 | End: 2020-02-03

## 2020-01-23 RX ORDER — MORPHINE SULFATE 50 MG/1
2 CAPSULE, EXTENDED RELEASE ORAL EVERY 4 HOURS
Refills: 0 | Status: DISCONTINUED | OUTPATIENT
Start: 2020-01-23 | End: 2020-01-30

## 2020-01-23 RX ORDER — ACETAMINOPHEN 500 MG
650 TABLET ORAL EVERY 6 HOURS
Refills: 0 | Status: DISCONTINUED | OUTPATIENT
Start: 2020-01-23 | End: 2020-01-26

## 2020-01-23 RX ORDER — SODIUM HYPOCHLORITE 0.125 %
1 SOLUTION, NON-ORAL MISCELLANEOUS ONCE
Refills: 0 | Status: COMPLETED | OUTPATIENT
Start: 2020-01-23 | End: 2020-01-25

## 2020-01-23 RX ORDER — DEXTROSE 50 % IN WATER 50 %
15 SYRINGE (ML) INTRAVENOUS ONCE
Refills: 0 | Status: DISCONTINUED | OUTPATIENT
Start: 2020-01-23 | End: 2020-02-03

## 2020-01-23 RX ORDER — ERYTHROPOIETIN 10000 [IU]/ML
10000 INJECTION, SOLUTION INTRAVENOUS; SUBCUTANEOUS
Refills: 0 | Status: DISCONTINUED | OUTPATIENT
Start: 2020-01-23 | End: 2020-02-03

## 2020-01-23 RX ORDER — IPRATROPIUM/ALBUTEROL SULFATE 18-103MCG
3 AEROSOL WITH ADAPTER (GRAM) INHALATION EVERY 6 HOURS
Refills: 0 | Status: DISCONTINUED | OUTPATIENT
Start: 2020-01-23 | End: 2020-01-24

## 2020-01-23 RX ORDER — ONDANSETRON 8 MG/1
4 TABLET, FILM COATED ORAL ONCE
Refills: 0 | Status: DISCONTINUED | OUTPATIENT
Start: 2020-01-23 | End: 2020-01-24

## 2020-01-23 RX ORDER — DEXTROSE 50 % IN WATER 50 %
12.5 SYRINGE (ML) INTRAVENOUS ONCE
Refills: 0 | Status: DISCONTINUED | OUTPATIENT
Start: 2020-01-23 | End: 2020-02-03

## 2020-01-23 RX ORDER — SODIUM HYPOCHLORITE 0.125 %
1 SOLUTION, NON-ORAL MISCELLANEOUS ONCE
Refills: 0 | Status: DISCONTINUED | OUTPATIENT
Start: 2020-01-23 | End: 2020-01-23

## 2020-01-23 RX ORDER — HEPARIN SODIUM 5000 [USP'U]/ML
5000 INJECTION INTRAVENOUS; SUBCUTANEOUS EVERY 8 HOURS
Refills: 0 | Status: DISCONTINUED | OUTPATIENT
Start: 2020-01-23 | End: 2020-02-03

## 2020-01-23 RX ORDER — COLLAGENASE CLOSTRIDIUM HIST. 250 UNIT/G
1 OINTMENT (GRAM) TOPICAL DAILY
Refills: 0 | Status: DISCONTINUED | OUTPATIENT
Start: 2020-01-23 | End: 2020-01-26

## 2020-01-23 RX ORDER — SODIUM CHLORIDE 9 MG/ML
1000 INJECTION, SOLUTION INTRAVENOUS
Refills: 0 | Status: DISCONTINUED | OUTPATIENT
Start: 2020-01-23 | End: 2020-02-03

## 2020-01-23 RX ORDER — FENTANYL CITRATE 50 UG/ML
25 INJECTION INTRAVENOUS
Refills: 0 | Status: DISCONTINUED | OUTPATIENT
Start: 2020-01-23 | End: 2020-01-24

## 2020-01-23 RX ORDER — GLUCAGON INJECTION, SOLUTION 0.5 MG/.1ML
1 INJECTION, SOLUTION SUBCUTANEOUS ONCE
Refills: 0 | Status: DISCONTINUED | OUTPATIENT
Start: 2020-01-23 | End: 2020-02-03

## 2020-01-23 RX ADMIN — MORPHINE SULFATE 2 MILLIGRAM(S): 50 CAPSULE, EXTENDED RELEASE ORAL at 10:14

## 2020-01-23 RX ADMIN — ERYTHROPOIETIN 10000 UNIT(S): 10000 INJECTION, SOLUTION INTRAVENOUS; SUBCUTANEOUS at 09:44

## 2020-01-23 RX ADMIN — MORPHINE SULFATE 2 MILLIGRAM(S): 50 CAPSULE, EXTENDED RELEASE ORAL at 02:00

## 2020-01-23 RX ADMIN — FENTANYL CITRATE 25 MICROGRAM(S): 50 INJECTION INTRAVENOUS at 23:40

## 2020-01-23 RX ADMIN — FENTANYL CITRATE 25 MICROGRAM(S): 50 INJECTION INTRAVENOUS at 23:30

## 2020-01-23 RX ADMIN — PANTOPRAZOLE SODIUM 40 MILLIGRAM(S): 20 TABLET, DELAYED RELEASE ORAL at 06:14

## 2020-01-23 RX ADMIN — MORPHINE SULFATE 2 MILLIGRAM(S): 50 CAPSULE, EXTENDED RELEASE ORAL at 01:27

## 2020-01-23 RX ADMIN — PANTOPRAZOLE SODIUM 40 MILLIGRAM(S): 20 TABLET, DELAYED RELEASE ORAL at 17:22

## 2020-01-23 RX ADMIN — Medication 1 APPLICATION(S): at 11:35

## 2020-01-23 RX ADMIN — FENTANYL CITRATE 25 MICROGRAM(S): 50 INJECTION INTRAVENOUS at 23:45

## 2020-01-23 RX ADMIN — MORPHINE SULFATE 2 MILLIGRAM(S): 50 CAPSULE, EXTENDED RELEASE ORAL at 18:00

## 2020-01-23 RX ADMIN — FENTANYL CITRATE 25 MICROGRAM(S): 50 INJECTION INTRAVENOUS at 23:35

## 2020-01-23 RX ADMIN — MORPHINE SULFATE 2 MILLIGRAM(S): 50 CAPSULE, EXTENDED RELEASE ORAL at 18:21

## 2020-01-23 NOTE — DISCHARGE NOTE NURSING/CASE MANAGEMENT/SOCIAL WORK - PATIENT PORTAL LINK FT
You can access the FollowMyHealth Patient Portal offered by [facility] by registering at the following website: [email or web address]. By joining [facility]’s FollowMyHealth portal, you will also be able to view your health information using other applications (apps) compatible with our system.

## 2020-01-23 NOTE — BRIEF OPERATIVE NOTE - OPERATION/FINDINGS
wc 1 . tunneled HD permacath insertion R IJ
Mild portal hypertensive gastropathy. Somewhat large incision required for placement of the tube and surgery requested for placement of one or two sutures. Mild portal hypertensive gastropathy. Successful placement of 20F G tube.
[id number] Sacral decubitus ulcer. Sharp debridement of necrotic and fibrinous tissue. Hemostasis achieved. Packing with dakins 0.5% solution with ABD overtop

## 2020-01-23 NOTE — PROGRESS NOTE ADULT - ASSESSMENT
Patient was seen and evaluated on dialysis.   Patient is tolerating the procedure well.     T(C): 36.6 (01-23-20 @ 10:45), Max: 37.2 (01-22-20 @ 20:00)  HR: 84 (01-23-20 @ 10:45) (77 - 90)  BP: 126/69 (01-23-20 @ 10:45) (98/58 - 126/69)    Continue dialysis:   Dialyzer:  Revaclear 300        QB:  400ml/min      QD: 500ml/min  Goal UF 1.5-2.0 L over 210 mins

## 2020-01-23 NOTE — PROGRESS NOTE ADULT - SUBJECTIVE AND OBJECTIVE BOX
seen for esrd, dysphagia, sacral decub    post HD today   no acute complaints.  ros negative     MEDICATIONS  (STANDING):  albuterol/ipratropium for Nebulization. 3 milliLiter(s) Nebulizer every 6 hours  aspirin  chewable 81 milliGRAM(s) Oral daily  chlorhexidine 2% Cloths 1 Application(s) Topical <User Schedule>  chlorhexidine 4% Liquid 1 Application(s) Topical <User Schedule>  collagenase Ointment 1 Application(s) Topical daily  Dakins Solution - 1/2 Strength 1 Application(s) Topical daily  dextrose 5%. 1000 milliLiter(s) (50 mL/Hr) IV Continuous <Continuous>  dextrose 50% Injectable 12.5 Gram(s) IV Push once  dextrose 50% Injectable 25 Gram(s) IV Push once  dextrose 50% Injectable 25 Gram(s) IV Push once  epoetin sara Injectable 80072 Unit(s) IV Push <User Schedule>  heparin  Injectable 5000 Unit(s) SubCutaneous every 8 hours  insulin lispro (HumaLOG) corrective regimen sliding scale   SubCutaneous every 6 hours  lactulose Syrup 10 Gram(s) Oral two times a day  pantoprazole  Injectable 40 milliGRAM(s) IV Push two times a day    MEDICATIONS  (PRN):  acetaminophen  Suppository .. 650 milliGRAM(s) Rectal every 6 hours PRN Temp greater or equal to 38C (100.4F), Mild Pain (1 - 3)  dextrose 40% Gel 15 Gram(s) Oral once PRN Blood Glucose LESS THAN 70 milliGRAM(s)/deciLiter  glucagon  Injectable 1 milliGRAM(s) IntraMuscular once PRN Glucose <70 milliGRAM(s)/deciLiter  morphine  - Injectable 2 milliGRAM(s) IV Push every 4 hours PRN Severe Pain (7 - 10)  sodium chloride 0.9% lock flush 10 milliLiter(s) IV Push every 1 hour PRN Pre/post blood products, medications, blood draw, and to maintain line patency      Allergies    ACE inhibitors (Other (Mild))    Intolerances    oxycodone (Sedation/Somnol)        Vital Signs Last 24 Hrs  T(C): 36.7 (23 Jan 2020 11:28), Max: 37.2 (22 Jan 2020 20:00)  T(F): 98 (23 Jan 2020 11:28), Max: 98.9 (22 Jan 2020 20:00)  HR: 80 (23 Jan 2020 11:28) (77 - 90)  BP: 128/70 (23 Jan 2020 11:28) (98/58 - 128/70)  BP(mean): --  RR: 18 (23 Jan 2020 11:28) (18 - 18)  SpO2: 98% (23 Jan 2020 11:28) (95% - 100%)    PHYSICAL EXAM:    GENERAL: NAD  CHEST/LUNG: Clear to percussion bilaterally  HEART: Regular rate and rhythm; S1 S2  ABDOMEN: Soft, + peg c/d/i bandage  Bowel sounds present  EXTREMITIES:  trace edema       LABS:                        7.9    9.87  )-----------( 409      ( 23 Jan 2020 08:26 )             23.6     01-23    136  |  96<L>  |  38.0<H>  ----------------------------<  139<H>  3.6   |  27.0  |  2.68<H>    Ca    8.2<L>      23 Jan 2020 08:26            CAPILLARY BLOOD GLUCOSE      POCT Blood Glucose.: 121 mg/dL (23 Jan 2020 11:34)  POCT Blood Glucose.: 142 mg/dL (23 Jan 2020 06:16)  POCT Blood Glucose.: 188 mg/dL (22 Jan 2020 23:39)  POCT Blood Glucose.: 163 mg/dL (22 Jan 2020 16:42)        RADIOLOGY & ADDITIONAL TESTS:

## 2020-01-23 NOTE — CHART NOTE - NSCHARTNOTEFT_GEN_A_CORE
Source: Patient [x ]  Family [ ]   other [ x] EMR and nursing    Current Diet: Diet, NPO with Tube Feed:   Tube Feeding Modality: Gastrostomy  Nepro  Total Volume for 24 Hours (mL): 1440  Continuous  Starting Tube Feed Rate {mL per Hour}: 30  Increase Tube Feed Rate by (mL): 10     Every 8 hours  Until Goal Tube Feed Rate (mL per Hour): 60  Tube Feed Duration (in Hours): 24  Tube Feed Start Time: 15:00 (01-11-20 @ 13:12)  Diet, NPO after Midnight:      NPO Start Date: 22-Jan-2020,   NPO Start Time: 23:59 (01-22-20 @ 19:39)    Enteral /Parenteral Nutrition: Nepro at 60mL/hr (x20 hours) providing 1200mL, 2160kcal, 97g pro    Current Weight:   (1/23) 160.9#  (1/21) 164.6#  (1/18) 175.4#  (1/17) 152.3#  (1/14) 163.9#  (1/11) 165#  (1/9) 167#  (1/7) 148#  (1/4) 178#  (1/2) 187#   (12/29) 198.6#  (12/28) 214.5#  (12/27) 209.8#  (12/26) 214.9#  -Question accuracy of weights, will continue to monitor. Weight fluctuations could be due to fluid fluctuations, pt receiving HD. Generalized 1+ edema noted.     Pertinent Medications: MEDICATIONS  (STANDING):  albuterol/ipratropium for Nebulization. 3 milliLiter(s) Nebulizer every 6 hours  aspirin  chewable 81 milliGRAM(s) Oral daily  chlorhexidine 2% Cloths 1 Application(s) Topical <User Schedule>  chlorhexidine 4% Liquid 1 Application(s) Topical <User Schedule>  collagenase Ointment 1 Application(s) Topical daily  Dakins Solution - 1/2 Strength 1 Application(s) Topical daily  dextrose 5%. 1000 milliLiter(s) (50 mL/Hr) IV Continuous <Continuous>  dextrose 50% Injectable 12.5 Gram(s) IV Push once  dextrose 50% Injectable 25 Gram(s) IV Push once  dextrose 50% Injectable 25 Gram(s) IV Push once  epoetin sara Injectable 19517 Unit(s) IV Push <User Schedule>  heparin  Injectable 5000 Unit(s) SubCutaneous every 8 hours  insulin lispro (HumaLOG) corrective regimen sliding scale   SubCutaneous every 6 hours  lactulose Syrup 10 Gram(s) Oral two times a day  pantoprazole  Injectable 40 milliGRAM(s) IV Push two times a day    MEDICATIONS  (PRN):  acetaminophen  Suppository .. 650 milliGRAM(s) Rectal every 6 hours PRN Temp greater or equal to 38C (100.4F), Mild Pain (1 - 3)  dextrose 40% Gel 15 Gram(s) Oral once PRN Blood Glucose LESS THAN 70 milliGRAM(s)/deciLiter  glucagon  Injectable 1 milliGRAM(s) IntraMuscular once PRN Glucose <70 milliGRAM(s)/deciLiter  morphine  - Injectable 2 milliGRAM(s) IV Push every 4 hours PRN Severe Pain (7 - 10)  sodium chloride 0.9% lock flush 10 milliLiter(s) IV Push every 1 hour PRN Pre/post blood products, medications, blood draw, and to maintain line patency    Pertinent Labs: CBC Full  -  ( 23 Jan 2020 08:26 )  WBC Count : 9.87 K/uL  RBC Count : 2.78 M/uL  Hemoglobin : 7.9 g/dL  Hematocrit : 23.6 %  Platelet Count - Automated : 409 K/uL  Mean Cell Volume : 84.9 fl  Mean Cell Hemoglobin : 28.4 pg  Mean Cell Hemoglobin Concentration : 33.5 gm/dL  Auto Neutrophil # : x  Auto Lymphocyte # : x  Auto Monocyte # : x  Auto Eosinophil # : x  Auto Basophil # : x  Auto Neutrophil % : x  Auto Lymphocyte % : x  Auto Monocyte % : x  Auto Eosinophil % : x  Auto Basophil % : x  01-23 Na136 mmol/L Glu 139 mg/dL<H> K+ 3.6 mmol/L Cr  2.68 mg/dL<H> BUN 38.0 mg/dL<H> Phos n/a   Alb n/a   PAB n/a       Skin: IAD, R heel DM ulcer, Coccyx unstageable     Nutrition focused physical exam previously conducted - found signs of malnutrition [ x]absent [ ]present    Subcutaneous fat loss: [ ] Orbital fat pads region, [ ]Buccal fat region, [ ]Triceps region,  [ ]Ribs region    Muscle wasting: [ ]Temples region, [ ]Clavicle region, [ ]Shoulder region, [ ]Scapula region, [ ]Interosseous region,  [ ]thigh region, [ ]Calf region    Estimated Needs:   [x ] no change since previous assessment  [ ] recalculated:     Current Nutrition Diagnosis: Pt remains at high nutrition risk and meets criteria for moderate (chronic) malnutrition related to inability to consume increased protein energy intake in setting of unstageable sacral wound, hypovolemic shock for possible GI bleed/blood loss anemia as evidenced by pt meeting <75% estimated energy intake > 1 month and moderate edema. TF currently held for procedure. Nursing reported pt tolerating feeds prior. Last BM 1/21 documented.     Recommendations:   1) Restart feeds when medically feasible  2) Monitor tolerance  3) Monitor weights and labs    Monitoring and Evaluation:   [ ] PO intake [ x] Tolerance to diet prescription [X] Weights  [X] Follow up per protocol [X] Labs:

## 2020-01-23 NOTE — PROGRESS NOTE ADULT - ASSESSMENT
63yo F wit paraplegia presented from nursing home for Stage III-IV sacral decubitus ulcer being followed by surgery for wound care. Going to OR today for debridement pending cardiac clearance.    - OR today for wound debridement  - Offloading bed and boots recommended  - Patient should be on a [place] bed, with offloading

## 2020-01-23 NOTE — BRIEF OPERATIVE NOTE - NSICDXBRIEFPREOP_GEN_ALL_CORE_FT
PRE-OP DIAGNOSIS:  ESRF (end stage renal failure) 03-Jan-2020 09:25:49  Yolanda Juárez
PRE-OP DIAGNOSIS:  Oropharyngeal dysphagia 10-Noah-2020 14:42:24  George Carvalho
PRE-OP DIAGNOSIS:  Decubitus ulcer 23-Jan-2020 22:52:27  Kp Harden

## 2020-01-23 NOTE — BRIEF OPERATIVE NOTE - NSICDXBRIEFPOSTOP_GEN_ALL_CORE_FT
POST-OP DIAGNOSIS:  ESRF (end stage renal failure) 03-Jan-2020 09:26:04  Yolanda Juárez
POST-OP DIAGNOSIS:  Portal hypertensive gastropathy 10-Noah-2020 14:42:52  George Carvalho  Oropharyngeal dysphagia 10-Noah-2020 14:42:38  George Carvalho
POST-OP DIAGNOSIS:  Sacral decubitus ulcer, stage IV 23-Jan-2020 22:52:39  Kp Harden

## 2020-01-23 NOTE — PROGRESS NOTE ADULT - SUBJECTIVE AND OBJECTIVE BOX
HPI/OVERNIGHT EVENTS:  No acute overnight events. Vital signs stable. Seen and examined at bedside found resting comfortably Patient with pain well controlled. Denies nausea, vomiting, fever, chills, chest pain, shortness of breath, or any new or concerning symptoms. Going to OR today for debridement of sacral wound.     MEDICATIONS  (STANDING):  albuterol/ipratropium for Nebulization. 3 milliLiter(s) Nebulizer every 6 hours  aspirin  chewable 81 milliGRAM(s) Oral daily  chlorhexidine 2% Cloths 1 Application(s) Topical <User Schedule>  chlorhexidine 4% Liquid 1 Application(s) Topical <User Schedule>  collagenase Ointment 1 Application(s) Topical daily  Dakins Solution - 1/2 Strength 1 Application(s) Topical daily  dextrose 5%. 1000 milliLiter(s) (50 mL/Hr) IV Continuous <Continuous>  dextrose 50% Injectable 12.5 Gram(s) IV Push once  dextrose 50% Injectable 25 Gram(s) IV Push once  dextrose 50% Injectable 25 Gram(s) IV Push once  epoetin sara Injectable 80679 Unit(s) IV Push <User Schedule>  heparin  Injectable 5000 Unit(s) SubCutaneous every 8 hours  insulin lispro (HumaLOG) corrective regimen sliding scale   SubCutaneous every 6 hours  lactulose Syrup 10 Gram(s) Oral two times a day  pantoprazole  Injectable 40 milliGRAM(s) IV Push two times a day    MEDICATIONS  (PRN):  acetaminophen  Suppository .. 650 milliGRAM(s) Rectal every 6 hours PRN Temp greater or equal to 38C (100.4F), Mild Pain (1 - 3)  dextrose 40% Gel 15 Gram(s) Oral once PRN Blood Glucose LESS THAN 70 milliGRAM(s)/deciLiter  glucagon  Injectable 1 milliGRAM(s) IntraMuscular once PRN Glucose <70 milliGRAM(s)/deciLiter  morphine  - Injectable 2 milliGRAM(s) IV Push every 4 hours PRN Severe Pain (7 - 10)  sodium chloride 0.9% lock flush 10 milliLiter(s) IV Push every 1 hour PRN Pre/post blood products, medications, blood draw, and to maintain line patency      Vital Signs Last 24 Hrs  T(C): 37.2 (22 Jan 2020 20:00), Max: 37.2 (22 Jan 2020 20:00)  T(F): 98.9 (22 Jan 2020 20:00), Max: 98.9 (22 Jan 2020 20:00)  HR: 85 (22 Jan 2020 20:00) (77 - 90)  BP: 102/60 (22 Jan 2020 20:00) (95/56 - 102/60)  BP(mean): --  RR: 18 (22 Jan 2020 20:00) (18 - 18)  SpO2: 98% (22 Jan 2020 20:00) (96% - 98%)    PE  Constitutional: patient resting comfortably in bed, in no acute distress  HEENT: EOMI, no active drainage or redness  Neck: Full ROM without pain  Respiratory: respirations are unlabored, no accessory muscle use, no conversational dyspnea  Cardiovascular: regular rate & rhythm  Gastrointestinal: Abdomen soft, non-tender, non-distended  : Large sacral ulcer with some areas of fibrinous exudate, No purulent drainage or surrounding cellulitis to skin edges. Depth beyond subcutaneous fat, infiltrating muscle/bone.  Musculoskeletal: Baseline paraplegia    I&O's Detail    21 Jan 2020 07:01  -  22 Jan 2020 07:00  --------------------------------------------------------  IN:    Enteral Tube Flush: 250 mL    Nepro: 360 mL  Total IN: 610 mL    OUT:    Other: 1600 mL  Total OUT: 1600 mL    Total NET: -990 mL          LABS:                        8.3    10.68 )-----------( 221      ( 22 Jan 2020 08:27 )             26.4     01-22    130<L>  |  93<L>  |  28.0<H>  ----------------------------<  198<H>  4.2   |  26.0  |  2.00<H>    Ca    8.0<L>      22 Jan 2020 08:27

## 2020-01-23 NOTE — PROGRESS NOTE ADULT - SUBJECTIVE AND OBJECTIVE BOX
[facility] DIVISION OF KIDNEY DISEASES AND HYPERTENSION -- FOLLOW UP NOTE  --------------------------------------------------------------------------------  Chief Complaint: ESRD on HD    24 hour events/subjective:  HD today      PAST HISTORY  --------------------------------------------------------------------------------  No significant changes to PMH, PSH, FHx, SHx, unless otherwise noted    ALLERGIES & MEDICATIONS  --------------------------------------------------------------------------------  Allergies  ACE inhibitors (Other (Mild))    Intolerances  oxycodone (Sedation/Somnol)    Standing Inpatient Medications  albuterol/ipratropium for Nebulization. 3 milliLiter(s) Nebulizer every 6 hours  aspirin  chewable 81 milliGRAM(s) Oral daily  chlorhexidine 2% Cloths 1 Application(s) Topical <User Schedule>  chlorhexidine 4% Liquid 1 Application(s) Topical <User Schedule>  collagenase Ointment 1 Application(s) Topical daily  Dakins Solution - 1/2 Strength 1 Application(s) Topical daily  dextrose 5%. 1000 milliLiter(s) IV Continuous <Continuous>  dextrose 50% Injectable 12.5 Gram(s) IV Push once  dextrose 50% Injectable 25 Gram(s) IV Push once  dextrose 50% Injectable 25 Gram(s) IV Push once  epoetin sara Injectable 14743 Unit(s) IV Push <User Schedule>  heparin  Injectable 5000 Unit(s) SubCutaneous every 8 hours  insulin lispro (HumaLOG) corrective regimen sliding scale   SubCutaneous every 6 hours  lactulose Syrup 10 Gram(s) Oral two times a day  pantoprazole  Injectable 40 milliGRAM(s) IV Push two times a day    PRN Inpatient Medications  acetaminophen  Suppository .. 650 milliGRAM(s) Rectal every 6 hours PRN  dextrose 40% Gel 15 Gram(s) Oral once PRN  glucagon  Injectable 1 milliGRAM(s) IntraMuscular once PRN  morphine  - Injectable 2 milliGRAM(s) IV Push every 4 hours PRN  sodium chloride 0.9% lock flush 10 milliLiter(s) IV Push every 1 hour PRN      REVIEW OF SYSTEMS  --------------------------------------------------------------------------------  ZIA. Unable to speak clearly    VITALS/PHYSICAL EXAM  --------------------------------------------------------------------------------  T(C): 36.6 (01-23-20 @ 10:45), Max: 37.2 (01-22-20 @ 20:00)  HR: 84 (01-23-20 @ 10:45) (77 - 90)  BP: 126/69 (01-23-20 @ 10:45) (98/58 - 126/69)  RR: 18 (01-23-20 @ 10:45) (18 - 18)  SpO2: 100% (01-23-20 @ 10:45) (95% - 100%)      Physical Exam:  	Gen: NAD  	HEENT: supple neck  	Pulm: CTA B/L  	CV: RRR, S1S2; no rub  	Back: ZIA  	Abd: +BS, soft, nontender/nondistended + PEG  	: No suprapubic tenderness  	UE: Warm, no edema; no asterixis  	LE: Warm,  no edema  	Neuro: No focal deficits  	Psych: Disoriented  	Skin: Warm, large sacral ulcer  	Vascular access: [facility]      LABS/STUDIES  --------------------------------------------------------------------------------              7.9    9.87  >-----------<  409      [01-23-20 @ 08:26]              23.6     136  |  96  |  38.0  ----------------------------<  139      [01-23-20 @ 08:26]  3.6   |  27.0  |  2.68        Ca     8.2     [01-23-20 @ 08:26]    Creatinine Trend:  SCr 2.68 [01-23 @ 08:26]  SCr 2.00 [01-22 @ 08:27]  SCr 3.04 [01-21 @ 12:34]  SCr 2.71 [01-20 @ 14:54]  SCr 3.14 [01-16 @ 12:40]    Urinalysis - [01-13-20 @ 14:52]      Color Brown / Appearance Slightly Turbid / SG 1.020 / pH 6.5      Gluc Negative / Ketone Trace  / Bili Small / Urobili Negative       Blood Large / Protein 100 / Leuk Est Moderate / Nitrite Negative      RBC TNTC / WBC 26-50 / Hyaline  / Gran  / Sq Epi  / Non Sq Epi Occasional / Bacteria Moderate      Iron 25, TIBC 154, %sat 16      [01-02-20 @ 09:39]  Ferritin 668      [01-02-20 @ 09:39]  PTH -- (Ca 8.0)      [01-02-20 @ 17:06]   119  PTH -- (Ca 9.1)      [05-30-19 @ 18:09]   107  Vitamin D (25OH) 25.2      [01-02-20 @ 17:06]  HbA1c 5.8      [12-26-19 @ 09:04]  TSH 1.10      [05-17-19 @ 12:21]    HBsAb 18.0      [12-26-19 @ 22:01]  HBsAg Nonreact      [12-26-19 @ 22:01]  HBcAb Nonreact      [12-26-19 @ 22:01]  HCV 0.20, Nonreact      [12-26-19 @ 22:01]

## 2020-01-23 NOTE — PROGRESS NOTE ADULT - ASSESSMENT
1. ESRD on HD  2. Right Pneumonia   3. DM II  4. GI Bleed  5. Acute blood loss anemia  6. Acute on Chronic Diastolic Heart Failure  7. Dysphagia  8. Unstageable sacral decubitus      HD in am - orders placed  UF w/HD  ABX completed  FSBS w/ISS  Transfuse PRN  Failed MBS and swallow evaluation  s/p PEG, tolerating feeding  - at goal of 60cc/hour  Wound care on board    S/P surgical debridement 1/19/2020  Plans for sacral ulcer debridement again today    On PPI , Lactulose  &  Rifaximin  Continue current management

## 2020-01-23 NOTE — PROGRESS NOTE ADULT - ASSESSMENT
65 yo female with hx of CHFpEF, CAD, CKD, HTN, DM2, cirrhosis, portal gastropathy, recurrent GI bleed, blind, sacral decub ulcer, presented to the ED from nursing home with encephalopathy, hypoxic resp failure requiring bipap, in ED found to have anemia with Hb 5 that responded to blood transfusions (hx of recurrent GI bleed for which multiple EGDs/colonoscopies have been performed, no further invasive work up as per GI). Also noted to have sepsis on admission that was secondary to PNA, possibly gram negative bacteria. Patient completed a course of zosyn. Also with KEITH on CKD now requiring HD via left IJ. Right chest tunnelled cath placed for access on 1/3/20. Patient failed speech and swallow and is NPO. NGT feeding was started and family to make now opting for PEG. Had upper ext mapping US showed left brachial veins DVT around the catheter and superficial right basilic and left cephalic vein thrombosis which vascular recommends full anticoagulation if not contraindicated due to GI bleed on admission; discussed with GI to hold on full dose anticoagulation for now.      #Oropharyngeal dysphagia   - failed MBS abd swallow evaluation  - s/p PEG , tolerating feeding   - at goal of 60cc/hour    #Unstageable Sacral Decub -   surgery planning OR debridement 1/ 23  Dress daily with dakin solution  Surgery did debridement bedside 1/19/20 and 1/21/2020  clean with half strength dakin solution qd   -family previously declining OR /sedation for pt, now as per surgery, family is agreeable    #Right pneumonia - suspected  aspiration  - treated initially abx restarted  for worsening infiltrate / CXR  - Abx completed     #bilateral superficial vein thrombosis on US doppler of upper ext and DVT on left brachial  vein around the catheter  - vascular rec anticoagulation due to GI bleed will hold off;  - poor candidate for full anticoagulation due to GI bleed on this admission and again in the past  - cont aspirin for now    #Acute on chronic diastolic CHF/ Acute hypoxic respiratory failure- improving   - cont HD for fluid removal   - Bipap as need.   -Supplemental oxygen via  NC comfortable     #Acute blood loss Anemia  - Hx of recurrent GI bleed? prior EGDs/ colonoscopies this year which were negative, prior CTA with +transverse colon bleed (Jan of 2019)  - Seen by GI on this admission no further work up   - repeat blood transfusion required for drop in hgb  - cont Protonix daily    #CASTAÑEDA Cirrhosis with portal gastropathy and AVMs  - change lactulose to standing through PEG with holding parameters  - c/w rifaximin  - cont PPI    #KEITH/ATN on CKD- started on new HD   - cont HD per nephrology   - nephrology follow up appreciated  - HD TTS schedule; next HD tomorrow  - Needs AVF, outpt HD set up  - Continue QUENTIN , PRBC transfusion prn with HD    #DM2- cont sliding scale insulin coverage   - Cont sliding scale insulin coverage     Eventual discharge to [facility]; palliative care evaluation prior, however at this time family not for DNR/DNI and want all possible treatment options.  Pt needs AVF and HD seat.

## 2020-01-23 NOTE — BRIEF OPERATIVE NOTE - NSICDXBRIEFPROCEDURE_GEN_ALL_CORE_FT
PROCEDURES:  Insertion, vascular access device, for hemodialysis 03-Jan-2020 09:25:26  Yolanda Juárez
PROCEDURES:  EGD, with PEG tube insertion 10-Noah-2020 14:42:07  George Carvalho
PROCEDURES:  Debridement, sacrum or ischium 23-Jan-2020 22:52:03  Kp Harden

## 2020-01-23 NOTE — PROGRESS NOTE ADULT - SUBJECTIVE AND OBJECTIVE BOX
[facility] DIVISION OF KIDNEY DISEASES AND HYPERTENSION -- HEMODIALYSIS NOTE  --------------------------------------------------------------------------------  Chief Complaint: ESRD/Ongoing hemodialysis requirement    24 hour events/subjective:  HD today      PAST HISTORY  --------------------------------------------------------------------------------  No significant changes to PMH, PSH, FHx, SHx, unless otherwise noted    ALLERGIES & MEDICATIONS  --------------------------------------------------------------------------------  Allergies  ACE inhibitors (Other (Mild))    Intolerances  oxycodone (Sedation/Somnol)    Standing Inpatient Medications  albuterol/ipratropium for Nebulization. 3 milliLiter(s) Nebulizer every 6 hours  aspirin  chewable 81 milliGRAM(s) Oral daily  chlorhexidine 2% Cloths 1 Application(s) Topical <User Schedule>  chlorhexidine 4% Liquid 1 Application(s) Topical <User Schedule>  collagenase Ointment 1 Application(s) Topical daily  Dakins Solution - 1/2 Strength 1 Application(s) Topical daily  dextrose 5%. 1000 milliLiter(s) IV Continuous <Continuous>  dextrose 50% Injectable 12.5 Gram(s) IV Push once  dextrose 50% Injectable 25 Gram(s) IV Push once  dextrose 50% Injectable 25 Gram(s) IV Push once  epoetin sara Injectable 42990 Unit(s) IV Push <User Schedule>  heparin  Injectable 5000 Unit(s) SubCutaneous every 8 hours  insulin lispro (HumaLOG) corrective regimen sliding scale   SubCutaneous every 6 hours  lactulose Syrup 10 Gram(s) Oral two times a day  pantoprazole  Injectable 40 milliGRAM(s) IV Push two times a day    PRN Inpatient Medications  acetaminophen  Suppository .. 650 milliGRAM(s) Rectal every 6 hours PRN  dextrose 40% Gel 15 Gram(s) Oral once PRN  glucagon  Injectable 1 milliGRAM(s) IntraMuscular once PRN  morphine  - Injectable 2 milliGRAM(s) IV Push every 4 hours PRN  sodium chloride 0.9% lock flush 10 milliLiter(s) IV Push every 1 hour PRN      REVIEW OF SYSTEMS  --------------------------------------------------------------------------------  See other progress note from today    VITALS/PHYSICAL EXAM  --------------------------------------------------------------------------------  T(C): 36.6 (01-23-20 @ 10:45), Max: 37.2 (01-22-20 @ 20:00)  HR: 84 (01-23-20 @ 10:45) (77 - 90)  BP: 126/69 (01-23-20 @ 10:45) (98/58 - 126/69)  RR: 18 (01-23-20 @ 10:45) (18 - 18)  SpO2: 100% (01-23-20 @ 10:45) (95% - 100%)      Physical Exam:  	Vascular access:  [facility]  See other progress note from today      LABS/STUDIES  --------------------------------------------------------------------------------              7.9    9.87  >-----------<  409      [01-23-20 @ 08:26]              23.6     136  |  96  |  38.0  ----------------------------<  139      [01-23-20 @ 08:26]  3.6   |  27.0  |  2.68        Ca     8.2     [01-23-20 @ 08:26]      Iron 25, TIBC 154, %sat 16      [01-02-20 @ 09:39]  Ferritin 668      [01-02-20 @ 09:39]  PTH -- (Ca 8.0)      [01-02-20 @ 17:06]   119  PTH -- (Ca 9.1)      [05-30-19 @ 18:09]   107  Vitamin D (25OH) 25.2      [01-02-20 @ 17:06]  HbA1c 5.8      [12-26-19 @ 09:04]  TSH 1.10      [05-17-19 @ 12:21]    HBsAb 18.0      [12-26-19 @ 22:01]  HBsAg Nonreact      [12-26-19 @ 22:01]  HBcAb Nonreact      [12-26-19 @ 22:01]  HCV 0.20, Nonreact      [12-26-19 @ 22:01]

## 2020-01-24 LAB
GLUCOSE BLDC GLUCOMTR-MCNC: 147 MG/DL — HIGH (ref 70–99)
GLUCOSE BLDC GLUCOMTR-MCNC: 161 MG/DL — HIGH (ref 70–99)
GLUCOSE BLDC GLUCOMTR-MCNC: 176 MG/DL — HIGH (ref 70–99)
GLUCOSE BLDC GLUCOMTR-MCNC: 254 MG/DL — HIGH (ref 70–99)

## 2020-01-24 PROCEDURE — 99233 SBSQ HOSP IP/OBS HIGH 50: CPT

## 2020-01-24 PROCEDURE — 99231 SBSQ HOSP IP/OBS SF/LOW 25: CPT

## 2020-01-24 PROCEDURE — 99232 SBSQ HOSP IP/OBS MODERATE 35: CPT

## 2020-01-24 RX ORDER — IPRATROPIUM/ALBUTEROL SULFATE 18-103MCG
3 AEROSOL WITH ADAPTER (GRAM) INHALATION EVERY 6 HOURS
Refills: 0 | Status: DISCONTINUED | OUTPATIENT
Start: 2020-01-24 | End: 2020-02-03

## 2020-01-24 RX ADMIN — HEPARIN SODIUM 5000 UNIT(S): 5000 INJECTION INTRAVENOUS; SUBCUTANEOUS at 05:57

## 2020-01-24 RX ADMIN — PANTOPRAZOLE SODIUM 40 MILLIGRAM(S): 20 TABLET, DELAYED RELEASE ORAL at 17:15

## 2020-01-24 RX ADMIN — Medication 1 APPLICATION(S): at 11:18

## 2020-01-24 RX ADMIN — MORPHINE SULFATE 2 MILLIGRAM(S): 50 CAPSULE, EXTENDED RELEASE ORAL at 02:46

## 2020-01-24 RX ADMIN — MORPHINE SULFATE 2 MILLIGRAM(S): 50 CAPSULE, EXTENDED RELEASE ORAL at 03:16

## 2020-01-24 RX ADMIN — HEPARIN SODIUM 5000 UNIT(S): 5000 INJECTION INTRAVENOUS; SUBCUTANEOUS at 22:10

## 2020-01-24 RX ADMIN — LACTULOSE 10 GRAM(S): 10 SOLUTION ORAL at 17:15

## 2020-01-24 RX ADMIN — Medication 2: at 06:02

## 2020-01-24 RX ADMIN — Medication 6: at 17:14

## 2020-01-24 RX ADMIN — CHLORHEXIDINE GLUCONATE 1 APPLICATION(S): 213 SOLUTION TOPICAL at 05:57

## 2020-01-24 RX ADMIN — Medication 81 MILLIGRAM(S): at 11:18

## 2020-01-24 RX ADMIN — PANTOPRAZOLE SODIUM 40 MILLIGRAM(S): 20 TABLET, DELAYED RELEASE ORAL at 05:56

## 2020-01-24 RX ADMIN — Medication 3 MILLILITER(S): at 03:18

## 2020-01-24 RX ADMIN — Medication 2: at 23:18

## 2020-01-24 RX ADMIN — HEPARIN SODIUM 5000 UNIT(S): 5000 INJECTION INTRAVENOUS; SUBCUTANEOUS at 13:24

## 2020-01-24 NOTE — PROGRESS NOTE ADULT - SUBJECTIVE AND OBJECTIVE BOX
[facility] DIVISION OF KIDNEY DISEASES AND HYPERTENSION -- FOLLOW UP NOTE  --------------------------------------------------------------------------------  Chief Complaint: ESRD on HD    24 hour events/subjective:        PAST HISTORY  --------------------------------------------------------------------------------  No significant changes to PMH, PSH, FHx, SHx, unless otherwise noted    ALLERGIES & MEDICATIONS  --------------------------------------------------------------------------------  Allergies  ACE inhibitors (Other (Mild))    Intolerances  oxycodone (Sedation/Somnol)    Standing Inpatient Medications  aspirin  chewable 81 milliGRAM(s) Oral daily  chlorhexidine 4% Liquid 1 Application(s) Topical <User Schedule>  collagenase Ointment 1 Application(s) Topical daily  Dakins Solution - 1/2 Strength 1 Application(s) Topical daily  Dakins Solution - Full Strength 1 Application(s) Topical once  dextrose 5%. 1000 milliLiter(s) IV Continuous <Continuous>  dextrose 50% Injectable 12.5 Gram(s) IV Push once  dextrose 50% Injectable 25 Gram(s) IV Push once  dextrose 50% Injectable 25 Gram(s) IV Push once  epoetin sara Injectable 11865 Unit(s) IV Push <User Schedule>  heparin  Injectable 5000 Unit(s) SubCutaneous every 8 hours  insulin lispro (HumaLOG) corrective regimen sliding scale   SubCutaneous every 6 hours  lactulose Syrup 10 Gram(s) Oral two times a day  pantoprazole  Injectable 40 milliGRAM(s) IV Push two times a day    PRN Inpatient Medications  acetaminophen  Suppository .. 650 milliGRAM(s) Rectal every 6 hours PRN  albuterol/ipratropium for Nebulization 3 milliLiter(s) Nebulizer every 6 hours PRN  dextrose 40% Gel 15 Gram(s) Oral once PRN  glucagon  Injectable 1 milliGRAM(s) IntraMuscular once PRN  morphine  - Injectable 2 milliGRAM(s) IV Push every 4 hours PRN  sodium chloride 0.9% lock flush 10 milliLiter(s) IV Push every 1 hour PRN      REVIEW OF SYSTEMS  --------------------------------------------------------------------------------  ZIA    VITALS/PHYSICAL EXAM  --------------------------------------------------------------------------------  T(C): 36.8 (01-24-20 @ 09:08), Max: 36.9 (01-23-20 @ 16:05)  HR: 88 (01-24-20 @ 09:10) (88 - 98)  BP: 110/62 (01-24-20 @ 09:08) (108/59 - 127/62)  RR: 18 (01-24-20 @ 09:08) (12 - 22)  SpO2: 98% (01-24-20 @ 09:10) (95% - 98%)    Height (cm): 162.6 (01-23-20 @ 08:00)      01-23-20 @ 07:01  -  01-24-20 @ 07:00  --------------------------------------------------------  IN: 0 mL / OUT: 1500 mL / NET: -1500 mL      Physical Exam:  	Gen: NAD  	HEENT: supple neck  	Pulm: CTA B/L  	CV: RRR, S1S2; no rub  	Back: ZIA  	Abd: +BS, soft, nontender/nondistended + PEG  	: No suprapubic tenderness  	UE: Warm, no edema; no asterixis  	LE: Warm,  no edema  	Neuro: No focal deficits  	Psych: Disoriented  	Skin: Warm, large sacral ulcer as per RN  	Vascular access: [facility]      LABS/STUDIES  --------------------------------------------------------------------------------              7.9    9.87  >-----------<  409      [01-23-20 @ 08:26]              23.6     136  |  96  |  38.0  ----------------------------<  139      [01-23-20 @ 08:26]  3.6   |  27.0  |  2.68        Ca     8.2     [01-23-20 @ 08:26]      Creatinine Trend:  SCr 2.68 [01-23 @ 08:26]  SCr 2.00 [01-22 @ 08:27]  SCr 3.04 [01-21 @ 12:34]  SCr 2.71 [01-20 @ 14:54]  SCr 3.14 [01-16 @ 12:40]    Urinalysis - [01-13-20 @ 14:52]      Color Brown / Appearance Slightly Turbid / SG 1.020 / pH 6.5      Gluc Negative / Ketone Trace  / Bili Small / Urobili Negative       Blood Large / Protein 100 / Leuk Est Moderate / Nitrite Negative      RBC TNTC / WBC 26-50 / Hyaline  / Gran  / Sq Epi  / Non Sq Epi Occasional / Bacteria Moderate      Iron 25, TIBC 154, %sat 16      [01-02-20 @ 09:39]  Ferritin 668      [01-02-20 @ 09:39]  PTH -- (Ca 8.0)      [01-02-20 @ 17:06]   119  PTH -- (Ca 9.1)      [05-30-19 @ 18:09]   107  Vitamin D (25OH) 25.2      [01-02-20 @ 17:06]  HbA1c 5.8      [12-26-19 @ 09:04]  TSH 1.10      [05-17-19 @ 12:21]    HBsAb 18.0      [12-26-19 @ 22:01]  HBsAg Nonreact      [12-26-19 @ 22:01]  HBcAb Nonreact      [12-26-19 @ 22:01]  HCV 0.20, Nonreact      [12-26-19 @ 22:01] [facility] DIVISION OF KIDNEY DISEASES AND HYPERTENSION -- FOLLOW UP NOTE  --------------------------------------------------------------------------------  Chief Complaint: ESRD on HD    24 hour events/subjective: No Significant change, Remains confused, Bed Bound,     No Progress,     PAST HISTORY  --------------------------------------------------------------------------------  No significant changes to PMH, PSH, FHx, SHx, unless otherwise noted    ALLERGIES & MEDICATIONS  --------------------------------------------------------------------------------  Allergies  ACE inhibitors (Other (Mild))    Intolerances  oxycodone (Sedation/Somnol)    Standing Inpatient Medications  aspirin  chewable 81 milliGRAM(s) Oral daily  chlorhexidine 4% Liquid 1 Application(s) Topical <User Schedule>  collagenase Ointment 1 Application(s) Topical daily  Dakins Solution - 1/2 Strength 1 Application(s) Topical daily  Dakins Solution - Full Strength 1 Application(s) Topical once  dextrose 5%. 1000 milliLiter(s) IV Continuous <Continuous>  dextrose 50% Injectable 12.5 Gram(s) IV Push once  dextrose 50% Injectable 25 Gram(s) IV Push once  dextrose 50% Injectable 25 Gram(s) IV Push once  epoetin sara Injectable 90879 Unit(s) IV Push <User Schedule>  heparin  Injectable 5000 Unit(s) SubCutaneous every 8 hours  insulin lispro (HumaLOG) corrective regimen sliding scale   SubCutaneous every 6 hours  lactulose Syrup 10 Gram(s) Oral two times a day  pantoprazole  Injectable 40 milliGRAM(s) IV Push two times a day    PRN Inpatient Medications  acetaminophen  Suppository .. 650 milliGRAM(s) Rectal every 6 hours PRN  albuterol/ipratropium for Nebulization 3 milliLiter(s) Nebulizer every 6 hours PRN  dextrose 40% Gel 15 Gram(s) Oral once PRN  glucagon  Injectable 1 milliGRAM(s) IntraMuscular once PRN  morphine  - Injectable 2 milliGRAM(s) IV Push every 4 hours PRN  sodium chloride 0.9% lock flush 10 milliLiter(s) IV Push every 1 hour PRN      REVIEW OF SYSTEMS  --------------------------------------------------------------------------------  ZIA    VITALS/PHYSICAL EXAM  --------------------------------------------------------------------------------  T(C): 36.8 (01-24-20 @ 09:08), Max: 36.9 (01-23-20 @ 16:05)  HR: 88 (01-24-20 @ 09:10) (88 - 98)  BP: 110/62 (01-24-20 @ 09:08) (108/59 - 127/62)  RR: 18 (01-24-20 @ 09:08) (12 - 22)  SpO2: 98% (01-24-20 @ 09:10) (95% - 98%)    Height (cm): 162.6 (01-23-20 @ 08:00)      01-23-20 @ 07:01  -  01-24-20 @ 07:00  --------------------------------------------------------  IN: 0 mL / OUT: 1500 mL / NET: -1500 mL      Physical Exam:  	Gen: NAD  	HEENT: supple neck  	Pulm: CTA B/L  	CV: RRR, S1S2; no rub  	Back: ZIA  	Abd: +BS, soft, nontender/nondistended + PEG  	: No suprapubic tenderness  	UE: Warm, no edema; no asterixis  	LE: Warm,  no edema  	Neuro: No focal deficits  	Psych: Disoriented  	Skin: Warm, large sacral ulcer as per RN  	Vascular access: [facility]      LABS/STUDIES  --------------------------------------------------------------------------------              7.9    9.87  >-----------<  409      [01-23-20 @ 08:26]              23.6     136  |  96  |  38.0  ----------------------------<  139      [01-23-20 @ 08:26]  3.6   |  27.0  |  2.68        Ca     8.2     [01-23-20 @ 08:26]      Creatinine Trend:  SCr 2.68 [01-23 @ 08:26]  SCr 2.00 [01-22 @ 08:27]  SCr 3.04 [01-21 @ 12:34]  SCr 2.71 [01-20 @ 14:54]  SCr 3.14 [01-16 @ 12:40]    Urinalysis - [01-13-20 @ 14:52]      Color Brown / Appearance Slightly Turbid / SG 1.020 / pH 6.5      Gluc Negative / Ketone Trace  / Bili Small / Urobili Negative       Blood Large / Protein 100 / Leuk Est Moderate / Nitrite Negative      RBC TNTC / WBC 26-50 / Hyaline  / Gran  / Sq Epi  / Non Sq Epi Occasional / Bacteria Moderate      Iron 25, TIBC 154, %sat 16      [01-02-20 @ 09:39]  Ferritin 668      [01-02-20 @ 09:39]  PTH -- (Ca 8.0)      [01-02-20 @ 17:06]   119  PTH -- (Ca 9.1)      [05-30-19 @ 18:09]   107  Vitamin D (25OH) 25.2      [01-02-20 @ 17:06]  HbA1c 5.8      [12-26-19 @ 09:04]  TSH 1.10      [05-17-19 @ 12:21]    HBsAb 18.0      [12-26-19 @ 22:01]  HBsAg Nonreact      [12-26-19 @ 22:01]  HBcAb Nonreact      [12-26-19 @ 22:01]  HCV 0.20, Nonreact      [12-26-19 @ 22:01]

## 2020-01-24 NOTE — PROGRESS NOTE ADULT - SUBJECTIVE AND OBJECTIVE BOX
seen for dysphagia, ESRD    no events/complaints.  responsive but lethargic  ros negative but limited due to lethargy    MEDICATIONS  (STANDING):  albuterol/ipratropium for Nebulization. 3 milliLiter(s) Nebulizer every 6 hours  aspirin  chewable 81 milliGRAM(s) Oral daily  chlorhexidine 4% Liquid 1 Application(s) Topical <User Schedule>  collagenase Ointment 1 Application(s) Topical daily  Dakins Solution - 1/2 Strength 1 Application(s) Topical daily  Dakins Solution - Full Strength 1 Application(s) Topical once  dextrose 5%. 1000 milliLiter(s) (50 mL/Hr) IV Continuous <Continuous>  dextrose 50% Injectable 12.5 Gram(s) IV Push once  dextrose 50% Injectable 25 Gram(s) IV Push once  dextrose 50% Injectable 25 Gram(s) IV Push once  epoetin sara Injectable 00075 Unit(s) IV Push <User Schedule>  heparin  Injectable 5000 Unit(s) SubCutaneous every 8 hours  insulin lispro (HumaLOG) corrective regimen sliding scale   SubCutaneous every 6 hours  lactulose Syrup 10 Gram(s) Oral two times a day  pantoprazole  Injectable 40 milliGRAM(s) IV Push two times a day    MEDICATIONS  (PRN):  acetaminophen  Suppository .. 650 milliGRAM(s) Rectal every 6 hours PRN Temp greater or equal to 38C (100.4F), Mild Pain (1 - 3)  dextrose 40% Gel 15 Gram(s) Oral once PRN Blood Glucose LESS THAN 70 milliGRAM(s)/deciLiter  glucagon  Injectable 1 milliGRAM(s) IntraMuscular once PRN Glucose <70 milliGRAM(s)/deciLiter  morphine  - Injectable 2 milliGRAM(s) IV Push every 4 hours PRN Severe Pain (7 - 10)  sodium chloride 0.9% lock flush 10 milliLiter(s) IV Push every 1 hour PRN Pre/post blood products, medications, blood draw, and to maintain line patency      Allergies    ACE inhibitors (Other (Mild))    Intolerances    oxycodone (Sedation/Somnol)    Vital Signs Last 24 Hrs  T(C): 36.8 (24 Jan 2020 09:08), Max: 36.9 (23 Jan 2020 16:05)  T(F): 98.2 (24 Jan 2020 09:08), Max: 98.4 (23 Jan 2020 16:05)  HR: 88 (24 Jan 2020 09:10) (88 - 98)  BP: 110/62 (24 Jan 2020 09:08) (108/59 - 127/62)  BP(mean): 67 (23 Jan 2020 23:53) (65 - 77)  RR: 18 (24 Jan 2020 09:08) (12 - 22)  SpO2: 98% (24 Jan 2020 09:10) (95% - 98%)    PHYSICAL EXAM:    GENERAL: NAD  CHEST/LUNG: Clear to percussion bilaterally  HEART: Regular rate and rhythm; S1 S2;  ABDOMEN: Soft, Nontender, +PEG c/d/i Bowel sounds present  EXTREMITIES:  no LE edema      LABS:                        7.9    9.87  )-----------( 409      ( 23 Jan 2020 08:26 )             23.6     01-23    136  |  96<L>  |  38.0<H>  ----------------------------<  139<H>  3.6   |  27.0  |  2.68<H>    Ca    8.2<L>      23 Jan 2020 08:26            CAPILLARY BLOOD GLUCOSE      POCT Blood Glucose.: 147 mg/dL (24 Jan 2020 11:16)  POCT Blood Glucose.: 161 mg/dL (24 Jan 2020 06:00)  POCT Blood Glucose.: 118 mg/dL (23 Jan 2020 23:30)  POCT Blood Glucose.: 135 mg/dL (23 Jan 2020 16:30)        RADIOLOGY & ADDITIONAL TESTS:

## 2020-01-24 NOTE — PROGRESS NOTE ADULT - SUBJECTIVE AND OBJECTIVE BOX
INTERVAL HPI/OVERNIGHT EVENTS:    Patient seen in the postoperative setting. She is s/p sharp debridement of her sacral decubitus ulcer with dakins wound care for the next 3 days. Patient has pain, moderately well controlled. Is NPO with tube feeds ordered via G-tube. Patient complainingof lower back pain. no n/v. denies fevers or chills. no cp or sob.    MEDICATIONS  (STANDING):  albuterol/ipratropium for Nebulization. 3 milliLiter(s) Nebulizer every 6 hours  aspirin  chewable 81 milliGRAM(s) Oral daily  chlorhexidine 2% Cloths 1 Application(s) Topical <User Schedule>  chlorhexidine 4% Liquid 1 Application(s) Topical <User Schedule>  collagenase Ointment 1 Application(s) Topical daily  Dakins Solution - 1/2 Strength 1 Application(s) Topical daily  Dakins Solution - Full Strength 1 Application(s) Topical once  dextrose 5%. 1000 milliLiter(s) (50 mL/Hr) IV Continuous <Continuous>  dextrose 50% Injectable 12.5 Gram(s) IV Push once  dextrose 50% Injectable 25 Gram(s) IV Push once  dextrose 50% Injectable 25 Gram(s) IV Push once  epoetin sara Injectable 01972 Unit(s) IV Push <User Schedule>  heparin  Injectable 5000 Unit(s) SubCutaneous every 8 hours  insulin lispro (HumaLOG) corrective regimen sliding scale   SubCutaneous every 6 hours  lactulose Syrup 10 Gram(s) Oral two times a day  pantoprazole  Injectable 40 milliGRAM(s) IV Push two times a day    MEDICATIONS  (PRN):  acetaminophen  Suppository .. 650 milliGRAM(s) Rectal every 6 hours PRN Temp greater or equal to 38C (100.4F), Mild Pain (1 - 3)  dextrose 40% Gel 15 Gram(s) Oral once PRN Blood Glucose LESS THAN 70 milliGRAM(s)/deciLiter  glucagon  Injectable 1 milliGRAM(s) IntraMuscular once PRN Glucose <70 milliGRAM(s)/deciLiter  morphine  - Injectable 2 milliGRAM(s) IV Push every 4 hours PRN Severe Pain (7 - 10)  sodium chloride 0.9% lock flush 10 milliLiter(s) IV Push every 1 hour PRN Pre/post blood products, medications, blood draw, and to maintain line patency      Vital Signs Last 24 Hrs  T(C): 36.7 (23 Jan 2020 22:53), Max: 36.9 (23 Jan 2020 16:05)  T(F): 98.1 (23 Jan 2020 22:53), Max: 98.4 (23 Jan 2020 16:05)  HR: 88 (24 Jan 2020 03:22) (80 - 98)  BP: 119/51 (23 Jan 2020 23:53) (102/52 - 128/70)  BP(mean): 67 (23 Jan 2020 23:53) (65 - 77)  RR: 12 (23 Jan 2020 23:53) (12 - 22)  SpO2: 96% (24 Jan 2020 03:22) (95% - 100%)    Physical Exam:  Constitutional: patient resting comfortably in bed, in no acute distress  HEENT: EOMI, no active drainage or redness  Respiratory: respirations are unlabored, no accessory muscle use, no conversational dyspnea  Cardiovascular: regular rate & rhythm  Gastrointestinal: Abdomen soft, non-tender, non-distended  Skin: Large sacral ulcer with some areas of fibrinous exudate s/p debridement, Areas of bleeding from debridement with strikethrough of the dresssing, No purulent drainage or surrounding cellulitis to skin edges. Depth beyond subcutaneous fat, infiltrating muscle/bone. 9x13x1.5 cm ulceration  Musculoskeletal: Baseline paraplegia      I&O's Detail    23 Jan 2020 07:01  -  24 Jan 2020 04:06  --------------------------------------------------------  IN:  Total IN: 0 mL    OUT:    Other: 1500 mL  Total OUT: 1500 mL    Total NET: -1500 mL          LABS:                        7.9    9.87  )-----------( 409      ( 23 Jan 2020 08:26 )             23.6     01-23    136  |  96<L>  |  38.0<H>  ----------------------------<  139<H>  3.6   |  27.0  |  2.68<H>    Ca    8.2<L>      23 Jan 2020 08:26            RADIOLOGY & ADDITIONAL STUDIES:

## 2020-01-24 NOTE — PROGRESS NOTE ADULT - ASSESSMENT
63 yo female with hx of CHFpEF, CAD, CKD, HTN, DM2, cirrhosis, portal gastropathy, recurrent GI bleed, blind, sacral decub ulcer, presented to the ED from nursing home with encephalopathy, hypoxic resp failure requiring bipap, in ED found to have anemia with Hb 5 that responded to blood transfusions (hx of recurrent GI bleed for which multiple EGDs/colonoscopies have been performed, no further invasive work up as per GI). Also noted to have sepsis on admission that was secondary to PNA, possibly gram negative bacteria. Patient completed a course of zosyn. Also with KEITH on CKD now requiring HD via left IJ. Right chest tunnelled cath placed for access on 1/3/20. Patient failed speech and swallow and is NPO. NGT feeding was started and family to make now opting for PEG. Had upper ext mapping US showed left brachial veins DVT around the catheter and superficial right basilic and left cephalic vein thrombosis which vascular recommends full anticoagulation if not contraindicated due to GI bleed on admission; discussed with GI to hold on full dose anticoagulation for now.    #Unstageable Sacral Decub -   debridement bedside 1/19/20 and 1/21/2020  debridement in OR 1/23   possible wound vac 1/27  surgery following  c/w wound care    #Oropharyngeal dysphagia   - failed MBS abd swallow evaluation  - s/p PEG , tolerating feeding     #Right pneumonia - suspected  aspiration  - treated initially abx restarted  for worsening infiltrate / CXR  - Abx completed     #bilateral superficial vein thrombosis on US doppler of upper ext and DVT on left brachial  vein around the catheter  - vascular rec anticoagulation due to GI bleed will hold off;  - poor candidate for full anticoagulation due to GI bleed on this admission and again in the past  - cont aspirin for now    #Acute on chronic diastolic CHF/ Acute hypoxic respiratory failure- improving   - cont HD for fluid removal   - Bipap as need.   -Supplemental oxygen via  NC comfortable     #Acute blood loss Anemia  - Hx of recurrent GI bleed? prior EGDs/ colonoscopies this year which were negative, prior CTA with +transverse colon bleed (Jan of 2019)  - Seen by GI on this admission no further work up   - repeat blood transfusion required for drop in hgb  - cont Protonix daily    #CASTAÑEDA Cirrhosis with portal gastropathy and AVMs  - change lactulose to standing through PEG with holding parameters  - c/w rifaximin  - cont PPI    #KEITH/ATN on CKD- started on new HD   - cont HD per nephrology   - nephrology follow up appreciated  - HD TTS schedule  - Needs AVF, outpt HD set up  - Continue QUENTIN , PRBC transfusion prn with HD    #DM2- cont sliding scale insulin coverage   - Cont sliding scale insulin coverage     Eventual discharge to [facility]/NH; palliative care evaluation prior, however at this time family not for DNR/DNI and want all possible treatment options.  Pt needs AVF and HD seat.

## 2020-01-24 NOTE — PROGRESS NOTE ADULT - ASSESSMENT
65yo F wit paraplegia presented from nursing home for Stage III-IV sacral decubitus ulcer being followed by surgery for wound care. Going to OR today for debridement pending cardiac clearance.    - BID dressing changes with dakins for 3 days (0.5% dakins)  - Re-eval for possible wound vac placement on 1/27  - Offloading bed and boots recommended Patient should be on a Cl[place]ron bed, with offloading   - Pain control  - Rest of care per primary team

## 2020-01-24 NOTE — PROGRESS NOTE ADULT - ASSESSMENT
1. ESRD on HD  2. Right Pneumonia   3. DM II  4. GI Bleed  5. Acute blood loss anemia  6. Acute on Chronic Diastolic Heart Failure  7. Dysphagia  8. Unstageable sacral decubitus    ABX completed  FSBS w/ISS  Transfuse PRN  Failed MBS and swallow evaluation  s/p PEG, tolerating feeding    Wound care on board    On PPI , Lactulose  &  Rifaximin  Continue current management 1. ESRD on HD  2. Right Pneumonia   3. DM II  4. GI Bleed  5. Acute blood loss anemia  6. Acute on Chronic Diastolic Heart Failure  7. Dysphagia  8. Unstageable sacral decubitus    ABX completed  FSBS w/ISS  Transfuse PRN  Failed MBS and swallow evaluation  s/p PEG, tolerating feeding    Wound care on board    On PPI , Lactulose  &  Rifaximin  Continue current management,    Prognosis poor, Consider Withdrawal of HD , Hospice  care,

## 2020-01-25 LAB
ANION GAP SERPL CALC-SCNC: 10 MMOL/L — SIGNIFICANT CHANGE UP (ref 5–17)
BLD GP AB SCN SERPL QL: SIGNIFICANT CHANGE UP
BUN SERPL-MCNC: 34 MG/DL — HIGH (ref 8–20)
CALCIUM SERPL-MCNC: 7.8 MG/DL — LOW (ref 8.6–10.2)
CHLORIDE SERPL-SCNC: 98 MMOL/L — SIGNIFICANT CHANGE UP (ref 98–107)
CO2 SERPL-SCNC: 28 MMOL/L — SIGNIFICANT CHANGE UP (ref 22–29)
CREAT SERPL-MCNC: 2.94 MG/DL — HIGH (ref 0.5–1.3)
GLUCOSE BLDC GLUCOMTR-MCNC: 198 MG/DL — HIGH (ref 70–99)
GLUCOSE BLDC GLUCOMTR-MCNC: 225 MG/DL — HIGH (ref 70–99)
GLUCOSE SERPL-MCNC: 113 MG/DL — HIGH (ref 70–99)
HCT VFR BLD CALC: 25.3 % — LOW (ref 34.5–45)
HGB BLD-MCNC: 8.2 G/DL — LOW (ref 11.5–15.5)
MCHC RBC-ENTMCNC: 27.6 PG — SIGNIFICANT CHANGE UP (ref 27–34)
MCHC RBC-ENTMCNC: 32.4 GM/DL — SIGNIFICANT CHANGE UP (ref 32–36)
MCV RBC AUTO: 85.2 FL — SIGNIFICANT CHANGE UP (ref 80–100)
PLATELET # BLD AUTO: 377 K/UL — SIGNIFICANT CHANGE UP (ref 150–400)
POTASSIUM SERPL-MCNC: 3.3 MMOL/L — LOW (ref 3.5–5.3)
POTASSIUM SERPL-SCNC: 3.3 MMOL/L — LOW (ref 3.5–5.3)
RBC # BLD: 2.97 M/UL — LOW (ref 3.8–5.2)
RBC # FLD: 19.8 % — HIGH (ref 10.3–14.5)
SODIUM SERPL-SCNC: 136 MMOL/L — SIGNIFICANT CHANGE UP (ref 135–145)
WBC # BLD: 11.46 K/UL — HIGH (ref 3.8–10.5)
WBC # FLD AUTO: 11.46 K/UL — HIGH (ref 3.8–10.5)

## 2020-01-25 PROCEDURE — 90937 HEMODIALYSIS REPEATED EVAL: CPT

## 2020-01-25 PROCEDURE — 99232 SBSQ HOSP IP/OBS MODERATE 35: CPT

## 2020-01-25 RX ADMIN — HEPARIN SODIUM 5000 UNIT(S): 5000 INJECTION INTRAVENOUS; SUBCUTANEOUS at 05:43

## 2020-01-25 RX ADMIN — Medication 4: at 05:43

## 2020-01-25 RX ADMIN — Medication 81 MILLIGRAM(S): at 12:23

## 2020-01-25 RX ADMIN — Medication 1 APPLICATION(S): at 12:24

## 2020-01-25 RX ADMIN — Medication 1 APPLICATION(S): at 12:26

## 2020-01-25 RX ADMIN — LACTULOSE 10 GRAM(S): 10 SOLUTION ORAL at 21:52

## 2020-01-25 RX ADMIN — MORPHINE SULFATE 2 MILLIGRAM(S): 50 CAPSULE, EXTENDED RELEASE ORAL at 18:16

## 2020-01-25 RX ADMIN — PANTOPRAZOLE SODIUM 40 MILLIGRAM(S): 20 TABLET, DELAYED RELEASE ORAL at 21:52

## 2020-01-25 RX ADMIN — HEPARIN SODIUM 5000 UNIT(S): 5000 INJECTION INTRAVENOUS; SUBCUTANEOUS at 13:14

## 2020-01-25 RX ADMIN — ERYTHROPOIETIN 10000 UNIT(S): 10000 INJECTION, SOLUTION INTRAVENOUS; SUBCUTANEOUS at 16:27

## 2020-01-25 RX ADMIN — MORPHINE SULFATE 2 MILLIGRAM(S): 50 CAPSULE, EXTENDED RELEASE ORAL at 10:56

## 2020-01-25 RX ADMIN — Medication 2: at 12:29

## 2020-01-25 RX ADMIN — MORPHINE SULFATE 2 MILLIGRAM(S): 50 CAPSULE, EXTENDED RELEASE ORAL at 17:43

## 2020-01-25 RX ADMIN — LACTULOSE 10 GRAM(S): 10 SOLUTION ORAL at 05:43

## 2020-01-25 RX ADMIN — HEPARIN SODIUM 5000 UNIT(S): 5000 INJECTION INTRAVENOUS; SUBCUTANEOUS at 21:51

## 2020-01-25 RX ADMIN — MORPHINE SULFATE 2 MILLIGRAM(S): 50 CAPSULE, EXTENDED RELEASE ORAL at 11:21

## 2020-01-25 NOTE — PROGRESS NOTE ADULT - SUBJECTIVE AND OBJECTIVE BOX
[facility] DIVISION OF KIDNEY DISEASES AND HYPERTENSION -- HEMODIALYSIS NOTE  --------------------------------------------------------------------------------  Chief Complaint: ESRD/Ongoing hemodialysis requirement    24 hour events/subjective: On [place]ron Bed, Bed Bound, Tolerating TF, TDC Exit site dry,     PAST HISTORY  --------------------------------------------------------------------------------  No significant changes to PMH, PSH, FHx, SHx, unless otherwise noted    ALLERGIES & MEDICATIONS  --------------------------------------------------------------------------------  Allergies    ACE inhibitors (Other (Mild))    Intolerances    oxycodone (Sedation/Somnol)    Standing Inpatient Medications  aspirin  chewable 81 milliGRAM(s) Oral daily  chlorhexidine 4% Liquid 1 Application(s) Topical <User Schedule>  collagenase Ointment 1 Application(s) Topical daily  Dakins Solution - 1/2 Strength 1 Application(s) Topical daily  dextrose 5%. 1000 milliLiter(s) IV Continuous <Continuous>  dextrose 50% Injectable 12.5 Gram(s) IV Push once  dextrose 50% Injectable 25 Gram(s) IV Push once  dextrose 50% Injectable 25 Gram(s) IV Push once  epoetin sara Injectable 79747 Unit(s) IV Push <User Schedule>  heparin  Injectable 5000 Unit(s) SubCutaneous every 8 hours  insulin lispro (HumaLOG) corrective regimen sliding scale   SubCutaneous every 6 hours  lactulose Syrup 10 Gram(s) Oral two times a day  pantoprazole  Injectable 40 milliGRAM(s) IV Push two times a day    PRN Inpatient Medications  acetaminophen  Suppository .. 650 milliGRAM(s) Rectal every 6 hours PRN  albuterol/ipratropium for Nebulization 3 milliLiter(s) Nebulizer every 6 hours PRN  dextrose 40% Gel 15 Gram(s) Oral once PRN  glucagon  Injectable 1 milliGRAM(s) IntraMuscular once PRN  morphine  - Injectable 2 milliGRAM(s) IV Push every 4 hours PRN  sodium chloride 0.9% lock flush 10 milliLiter(s) IV Push every 1 hour PRN    REVIEW OF SYSTEMS ( As On Admission )   --------------------------------------------------------------------------------  Gen: ++ weight changes, fatigue, No  fevers/chills, weakness  Skin: No rashes  Head/Eyes/Ears/Mouth: Normal hearing; Poor vision w/o blurriness;   Respiratory: No dyspnea, cough, wheezing, hemoptysis  CV: No chest pain, PND, orthopnea  GI: No abdominal pain, diarrhea, constipation, nausea, vomiting, melena, hematochezia  : No increased frequency, dysuria, hematuria, nocturia  MSK: No joint pain/swelling; no back pain; no edema  Neuro: No dizziness/lightheadedness, weakness, seizures, numbness, tingling  Heme: No easy bruising or bleeding  Endo: No heat/cold intolerance  Psych: No significant nervousness, anxiety, stress, depression    All other systems were reviewed and are negative, except as noted.    VITALS/PHYSICAL EXAM  --------------------------------------------------------------------------------  T(C): 36.4 (01-25-20 @ 19:49), Max: 36.8 (01-25-20 @ 15:30)  HR: 91 (01-25-20 @ 19:49) (80 - 91)  BP: 116/57 (01-25-20 @ 19:49) (95/59 - 116/57)  RR: 18 (01-25-20 @ 19:49) (18 - 18)  SpO2: 99% (01-25-20 @ 19:49) (97% - 99%)    01-24-20 @ 07:01  -  01-25-20 @ 07:00  --------------------------------------------------------  IN: 720 mL / OUT: 0 mL / NET: 720 mL    01-25-20 @ 07:01  -  01-26-20 @ 04:20  --------------------------------------------------------  IN: 1370 mL / OUT: 900 mL / NET: 470 mL    Physical Exam:  	Gen: NAD, pale, ill-appearing  	HEENT: supple neck,   	Pulm: CTA B/L  	CV: RRR, S1S2; no rub  	Back: No spinal or CVA tenderness; no sacral edema  	Abd: +BS, soft, nontender/nondistended  	: No suprapubic tenderness  	UE: Warm, FROM, no clubbing, intact strength; no edema; no asterixis  	LE: Warm, FROM, no clubbing, intact strength; no edema  	Neuro: No focal deficits,   	Psych: Normal affect and mood  	Skin: Warm, without rashes  	Vascular access: TDC,     LABS/STUDIES  --------------------------------------------------------------------------------              8.2    11.46 >-----------<  377      [01-25-20 @ 16:09]              25.3     136  |  98  |  34.0  ----------------------------<  113      [01-25-20 @ 16:09]  3.3   |  28.0  |  2.94        Ca     7.8     [01-25-20 @ 16:09    Iron 25, TIBC 154, %sat 16      [01-02-20 @ 09:39]  Ferritin 668      [01-02-20 @ 09:39]  PTH -- (Ca 8.0)      [01-02-20 @ 17:06]   119  PTH -- (Ca 9.1)      [05-30-19 @ 18:09]   107  Vitamin D (25OH) 25.2      [01-02-20 @ 17:06]  HbA1c 5.8      [12-26-19 @ 09:04]  TSH 1.10      [05-17-19 @ 12:21]

## 2020-01-25 NOTE — PHYSICAL THERAPY INITIAL EVALUATION ADULT - ADDITIONAL COMMENTS
Patient is questionable historian. She states she lives in a house with no DAVID and no stairs inside. Unable to obtain if she lives alone. She states she was able to walk with a RW prior to admission, but unclear if she needed assistance. She reports owning a RW, commode, and shower chair.

## 2020-01-25 NOTE — PHYSICAL THERAPY INITIAL EVALUATION ADULT - GENERAL OBSERVATIONS, REHAB EVAL
Patient received lying in bed, NAD, breathing O2 via NC, +primafit, +PEG, +. Pt agreeable to Physical Therapy evaluation.

## 2020-01-25 NOTE — PROGRESS NOTE ADULT - ASSESSMENT
64F with paraplegia presented from nursing home for Stage III-IV sacral decubitus ulcer being followed by surgery for wound care, POD #2 sharp debridement    - continue current wound management  - pain control  - possible wound vac in near future

## 2020-01-25 NOTE — PROGRESS NOTE ADULT - SUBJECTIVE AND OBJECTIVE BOX
INTERVAL HPI/OVERNIGHT EVENTS: c/o pain to back, no new complaints, no overnight events    STATUS POST:  OR sacral decub debridement    POST OPERATIVE DAY #: 2        MEDICATIONS  (STANDING):  aspirin  chewable 81 milliGRAM(s) Oral daily  chlorhexidine 4% Liquid 1 Application(s) Topical <User Schedule>  collagenase Ointment 1 Application(s) Topical daily  Dakins Solution - 1/2 Strength 1 Application(s) Topical daily  Dakins Solution - Full Strength 1 Application(s) Topical once  dextrose 5%. 1000 milliLiter(s) (50 mL/Hr) IV Continuous <Continuous>  dextrose 50% Injectable 12.5 Gram(s) IV Push once  dextrose 50% Injectable 25 Gram(s) IV Push once  dextrose 50% Injectable 25 Gram(s) IV Push once  epoetin sara Injectable 21014 Unit(s) IV Push <User Schedule>  heparin  Injectable 5000 Unit(s) SubCutaneous every 8 hours  insulin lispro (HumaLOG) corrective regimen sliding scale   SubCutaneous every 6 hours  lactulose Syrup 10 Gram(s) Oral two times a day  pantoprazole  Injectable 40 milliGRAM(s) IV Push two times a day    MEDICATIONS  (PRN):  acetaminophen  Suppository .. 650 milliGRAM(s) Rectal every 6 hours PRN Temp greater or equal to 38C (100.4F), Mild Pain (1 - 3)  albuterol/ipratropium for Nebulization 3 milliLiter(s) Nebulizer every 6 hours PRN Shortness of Breath and/or Wheezing  dextrose 40% Gel 15 Gram(s) Oral once PRN Blood Glucose LESS THAN 70 milliGRAM(s)/deciLiter  glucagon  Injectable 1 milliGRAM(s) IntraMuscular once PRN Glucose <70 milliGRAM(s)/deciLiter  morphine  - Injectable 2 milliGRAM(s) IV Push every 4 hours PRN Severe Pain (7 - 10)  sodium chloride 0.9% lock flush 10 milliLiter(s) IV Push every 1 hour PRN Pre/post blood products, medications, blood draw, and to maintain line patency      Vital Signs Last 24 Hrs  T(C): 37.3 (24 Jan 2020 23:21), Max: 37.3 (24 Jan 2020 23:21)  T(F): 99.1 (24 Jan 2020 23:21), Max: 99.1 (24 Jan 2020 23:21)  HR: 85 (24 Jan 2020 23:21) (82 - 94)  BP: 91/50 (24 Jan 2020 23:21) (91/50 - 110/62)  BP(mean): --  RR: 18 (24 Jan 2020 23:21) (18 - 18)  SpO2: 97% (24 Jan 2020 23:21) (96% - 98%)    PHYSICAL EXAM:      Constitutional: NAD    Back: Dressing intact, not taken down as was recently changed by RN              I&O's Detail    23 Jan 2020 07:01  -  24 Jan 2020 07:00  --------------------------------------------------------  IN:    Nepro: 60 mL  Total IN: 60 mL    OUT:    Other: 1500 mL  Total OUT: 1500 mL    Total NET: -1440 mL      24 Jan 2020 07:01  -  25 Jan 2020 01:33  --------------------------------------------------------  IN:    Nepro: 720 mL  Total IN: 720 mL    OUT:  Total OUT: 0 mL    Total NET: 720 mL          LABS:                        7.9    9.87  )-----------( 409      ( 23 Jan 2020 08:26 )             23.6     01-23    136  |  96<L>  |  38.0<H>  ----------------------------<  139<H>  3.6   |  27.0  |  2.68<H>    Ca    8.2<L>      23 Jan 2020 08:26            RADIOLOGY & ADDITIONAL STUDIES:

## 2020-01-25 NOTE — PROGRESS NOTE ADULT - SUBJECTIVE AND OBJECTIVE BOX
seen for esrd sacral decub dysphagia    no acute complaints  ros negative  no events per RN    MEDICATIONS  (STANDING):  aspirin  chewable 81 milliGRAM(s) Oral daily  chlorhexidine 4% Liquid 1 Application(s) Topical <User Schedule>  collagenase Ointment 1 Application(s) Topical daily  Dakins Solution - 1/2 Strength 1 Application(s) Topical daily  dextrose 5%. 1000 milliLiter(s) (50 mL/Hr) IV Continuous <Continuous>  dextrose 50% Injectable 12.5 Gram(s) IV Push once  dextrose 50% Injectable 25 Gram(s) IV Push once  dextrose 50% Injectable 25 Gram(s) IV Push once  epoetin sara Injectable 12933 Unit(s) IV Push <User Schedule>  heparin  Injectable 5000 Unit(s) SubCutaneous every 8 hours  insulin lispro (HumaLOG) corrective regimen sliding scale   SubCutaneous every 6 hours  lactulose Syrup 10 Gram(s) Oral two times a day  pantoprazole  Injectable 40 milliGRAM(s) IV Push two times a day    MEDICATIONS  (PRN):  acetaminophen  Suppository .. 650 milliGRAM(s) Rectal every 6 hours PRN Temp greater or equal to 38C (100.4F), Mild Pain (1 - 3)  albuterol/ipratropium for Nebulization 3 milliLiter(s) Nebulizer every 6 hours PRN Shortness of Breath and/or Wheezing  dextrose 40% Gel 15 Gram(s) Oral once PRN Blood Glucose LESS THAN 70 milliGRAM(s)/deciLiter  glucagon  Injectable 1 milliGRAM(s) IntraMuscular once PRN Glucose <70 milliGRAM(s)/deciLiter  morphine  - Injectable 2 milliGRAM(s) IV Push every 4 hours PRN Severe Pain (7 - 10)  sodium chloride 0.9% lock flush 10 milliLiter(s) IV Push every 1 hour PRN Pre/post blood products, medications, blood draw, and to maintain line patency      Allergies    ACE inhibitors (Other (Mild))    Intolerances    oxycodone (Sedation/Somnol)      Vital Signs Last 24 Hrs  T(C): 36 (25 Jan 2020 10:07), Max: 37.3 (24 Jan 2020 23:21)  T(F): 96.8 (25 Jan 2020 10:07), Max: 99.1 (24 Jan 2020 23:21)  HR: 80 (25 Jan 2020 10:07) (80 - 85)  BP: 95/59 (25 Jan 2020 10:07) (91/50 - 96/58)  BP(mean): --  RR: 18 (25 Jan 2020 10:07) (18 - 18)  SpO2: 98% (25 Jan 2020 10:07) (97% - 98%)    PHYSICAL EXAM:    GENERAL: NAD  CHEST/LUNG: Clear to percussion bilaterally  HEART: Regular rate and rhythm; S1 S2  ABDOMEN: Soft, Bowel sounds present  +PEG   EXTREMITIES:  trace edema     LABS:                CAPILLARY BLOOD GLUCOSE      POCT Blood Glucose.: 198 mg/dL (25 Jan 2020 11:52)  POCT Blood Glucose.: 225 mg/dL (25 Jan 2020 05:41)  POCT Blood Glucose.: 176 mg/dL (24 Jan 2020 23:17)  POCT Blood Glucose.: 254 mg/dL (24 Jan 2020 16:43)        RADIOLOGY & ADDITIONAL TESTS:

## 2020-01-25 NOTE — PROGRESS NOTE ADULT - ASSESSMENT
65 yo female with hx of CHFpEF, CAD, CKD, HTN, DM2, cirrhosis, portal gastropathy, recurrent GI bleed, blind, sacral decub ulcer, presented to the ED from nursing home with encephalopathy, hypoxic resp failure requiring bipap, in ED found to have anemia with Hb 5 that responded to blood transfusions (hx of recurrent GI bleed for which multiple EGDs/colonoscopies have been performed, no further invasive work up as per GI). Also noted to have sepsis on admission that was secondary to PNA, possibly gram negative bacteria. Patient completed a course of zosyn. Also with KEITH on CKD now requiring HD via left IJ. Right chest tunnelled cath placed for access on 1/3/20. Patient failed speech and swallow and is NPO. NGT feeding was started and family to make now opting for PEG. Had upper ext mapping US showed left brachial veins DVT around the catheter and superficial right basilic and left cephalic vein thrombosis which vascular recommends full anticoagulation if not contraindicated due to GI bleed on admission; discussed with GI to hold on full dose anticoagulation for now.    #Unstageable Sacral Decub -   debridement bedside 1/19/20 and 1/21/2020  debridement in OR 1/23   possible wound vac 1/27  surgery following  c/w wound care    #Oropharyngeal dysphagia   - failed MBS abd swallow evaluation  - s/p PEG , tolerating TF    #Right pneumonia - suspected  aspiration  - treated initially abx restarted  for worsening infiltrate / CXR  - Abx completed     #bilateral superficial vein thrombosis on US doppler of upper ext and DVT on left brachial  vein around the catheter  - vascular rec anticoagulation due to GI bleed will hold off;  - poor candidate for full anticoagulation due to GI bleed on this admission and again in the past  - cont aspirin for now    #Acute on chronic diastolic CHF/ Acute hypoxic respiratory failure- improving   - cont HD for fluid removal   - Bipap as need.   -Supplemental oxygen via  NC comfortable     #Acute blood loss Anemia  - Hx of recurrent GI bleed? prior EGDs/ colonoscopies this year which were negative, prior CTA with +transverse colon bleed (Jan of 2019)  - Seen by GI on this admission no further work up   - repeat blood transfusion required for drop in hgb  - cont Protonix daily    #CASTAÑEDA Cirrhosis with portal gastropathy and AVMs  - change lactulose to standing through PEG with holding parameters  - c/w rifaximin  - cont PPI    #KEITH/ATN on CKD- started on new HD   - cont HD per nephrology   - nephrology follow up appreciated  - HD TTS schedule  - Needs AVF, outpt HD set up  - Continue QUENTIN , PRBC transfusion prn with HD    #DM2- cont sliding scale insulin coverage   - Cont sliding scale insulin coverage     Eventual discharge to [facility]/NH; palliative care evaluation prior, however at this time family not for DNR/DNI and want all possible treatment options.  Pt needs AVF and HD seat.

## 2020-01-25 NOTE — PHYSICAL THERAPY INITIAL EVALUATION ADULT - BED MOBILITY LIMITATIONS, REHAB EVAL
Patient unable to maintain upright posture sitting at EOB, needed full support/impaired ability to control trunk for mobility/decreased ability to use legs for bridging/pushing/decreased ability to use arms for pushing/pulling

## 2020-01-25 NOTE — PROGRESS NOTE ADULT - ASSESSMENT
HD Time : 3.5 hours,  fluid removal 1-1.5 liters as tolerated,     Transfused  1 unit PRBC's during HD,

## 2020-01-26 LAB
GLUCOSE BLDC GLUCOMTR-MCNC: 131 MG/DL — HIGH (ref 70–99)
GLUCOSE BLDC GLUCOMTR-MCNC: 183 MG/DL — HIGH (ref 70–99)
GLUCOSE BLDC GLUCOMTR-MCNC: 193 MG/DL — HIGH (ref 70–99)
GLUCOSE BLDC GLUCOMTR-MCNC: 218 MG/DL — HIGH (ref 70–99)
GLUCOSE BLDC GLUCOMTR-MCNC: 228 MG/DL — HIGH (ref 70–99)

## 2020-01-26 PROCEDURE — 99231 SBSQ HOSP IP/OBS SF/LOW 25: CPT

## 2020-01-26 PROCEDURE — 99232 SBSQ HOSP IP/OBS MODERATE 35: CPT

## 2020-01-26 RX ORDER — ACETAMINOPHEN 500 MG
650 TABLET ORAL EVERY 6 HOURS
Refills: 0 | Status: DISCONTINUED | OUTPATIENT
Start: 2020-01-26 | End: 2020-02-03

## 2020-01-26 RX ADMIN — PANTOPRAZOLE SODIUM 40 MILLIGRAM(S): 20 TABLET, DELAYED RELEASE ORAL at 18:13

## 2020-01-26 RX ADMIN — PANTOPRAZOLE SODIUM 40 MILLIGRAM(S): 20 TABLET, DELAYED RELEASE ORAL at 07:21

## 2020-01-26 RX ADMIN — Medication 2: at 16:36

## 2020-01-26 RX ADMIN — Medication 2: at 07:20

## 2020-01-26 RX ADMIN — HEPARIN SODIUM 5000 UNIT(S): 5000 INJECTION INTRAVENOUS; SUBCUTANEOUS at 21:32

## 2020-01-26 RX ADMIN — LACTULOSE 10 GRAM(S): 10 SOLUTION ORAL at 18:13

## 2020-01-26 RX ADMIN — Medication 4: at 00:25

## 2020-01-26 RX ADMIN — CHLORHEXIDINE GLUCONATE 1 APPLICATION(S): 213 SOLUTION TOPICAL at 07:21

## 2020-01-26 RX ADMIN — HEPARIN SODIUM 5000 UNIT(S): 5000 INJECTION INTRAVENOUS; SUBCUTANEOUS at 14:30

## 2020-01-26 RX ADMIN — LACTULOSE 10 GRAM(S): 10 SOLUTION ORAL at 07:21

## 2020-01-26 RX ADMIN — Medication 1 APPLICATION(S): at 12:06

## 2020-01-26 RX ADMIN — Medication 81 MILLIGRAM(S): at 12:06

## 2020-01-26 RX ADMIN — HEPARIN SODIUM 5000 UNIT(S): 5000 INJECTION INTRAVENOUS; SUBCUTANEOUS at 07:20

## 2020-01-26 NOTE — PROGRESS NOTE ADULT - SUBJECTIVE AND OBJECTIVE BOX
HPI/OVERNIGHT EVENTS: No acute events overnight. BID dressing changes were completed. Pain is well controlled. Denies fever, chills, nausea, vomiting, chest pain, SOB, dizziness, abd pain or any other concerning symptoms    Vital Signs Last 24 Hrs  T(C): 36.4 (25 Jan 2020 19:49), Max: 36.8 (25 Jan 2020 15:30)  T(F): 97.5 (25 Jan 2020 19:49), Max: 98.3 (25 Jan 2020 15:30)  HR: 91 (25 Jan 2020 19:49) (80 - 91)  BP: 116/57 (25 Jan 2020 19:49) (95/59 - 116/57)  BP(mean): --  RR: 18 (25 Jan 2020 19:49) (18 - 18)  SpO2: 99% (25 Jan 2020 19:49) (97% - 99%)    I&O's Detail    24 Jan 2020 07:01  -  25 Jan 2020 07:00  --------------------------------------------------------  IN:    Nepro: 720 mL  Total IN: 720 mL    OUT:  Total OUT: 0 mL    Total NET: 720 mL      25 Jan 2020 07:01  -  26 Jan 2020 00:03  --------------------------------------------------------  IN:    Enteral Tube Flush: 150 mL    Nepro: 720 mL    Packed Red Blood Cells: 500 mL  Total IN: 1370 mL    OUT:    Other: 900 mL  Total OUT: 900 mL    Total NET: 470 mL      Physical Exam:  Constitutional: patient resting comfortably in bed, in no acute distress  HEENT: EOMI, no active drainage or redness  Respiratory: respirations are unlabored, no accessory muscle use, no conversational dyspnea  Cardiovascular: regular rate & rhythm  Gastrointestinal: Abdomen soft, non-tender, non-distended  Skin: Large sacral ulcer with some areas of fibrinous exudate s/p debridement, Areas of bleeding from debridement, No purulent drainage or surrounding cellulitis to skin edges. Depth beyond subcutaneous fat, infiltrating muscle/bone. 9x13x1.5 cm ulceration  Musculoskeletal: Baseline paraplegia      LABS:                        8.2    11.46 )-----------( 377      ( 25 Jan 2020 16:09 )             25.3     01-25    136  |  98  |  34.0<H>  ----------------------------<  113<H>  3.3<L>   |  28.0  |  2.94<H>    Ca    7.8<L>      25 Jan 2020 16:09            MEDICATIONS  (STANDING):  aspirin  chewable 81 milliGRAM(s) Oral daily  chlorhexidine 4% Liquid 1 Application(s) Topical <User Schedule>  collagenase Ointment 1 Application(s) Topical daily  Dakins Solution - 1/2 Strength 1 Application(s) Topical daily  dextrose 5%. 1000 milliLiter(s) (50 mL/Hr) IV Continuous <Continuous>  dextrose 50% Injectable 12.5 Gram(s) IV Push once  dextrose 50% Injectable 25 Gram(s) IV Push once  dextrose 50% Injectable 25 Gram(s) IV Push once  epoetin sara Injectable 30247 Unit(s) IV Push <User Schedule>  heparin  Injectable 5000 Unit(s) SubCutaneous every 8 hours  insulin lispro (HumaLOG) corrective regimen sliding scale   SubCutaneous every 6 hours  lactulose Syrup 10 Gram(s) Oral two times a day  pantoprazole  Injectable 40 milliGRAM(s) IV Push two times a day    MEDICATIONS  (PRN):  acetaminophen  Suppository .. 650 milliGRAM(s) Rectal every 6 hours PRN Temp greater or equal to 38C (100.4F), Mild Pain (1 - 3)  albuterol/ipratropium for Nebulization 3 milliLiter(s) Nebulizer every 6 hours PRN Shortness of Breath and/or Wheezing  dextrose 40% Gel 15 Gram(s) Oral once PRN Blood Glucose LESS THAN 70 milliGRAM(s)/deciLiter  glucagon  Injectable 1 milliGRAM(s) IntraMuscular once PRN Glucose <70 milliGRAM(s)/deciLiter  morphine  - Injectable 2 milliGRAM(s) IV Push every 4 hours PRN Severe Pain (7 - 10)  sodium chloride 0.9% lock flush 10 milliLiter(s) IV Push every 1 hour PRN Pre/post blood products, medications, blood draw, and to maintain line patency

## 2020-01-26 NOTE — PROGRESS NOTE ADULT - ASSESSMENT
63 yo F wit paraplegia presented from nursing home for Stage III-IV sacral decubitus ulcer being followed by surgery for wound care.    - BID dressing changes with dakins (0.5% dakins)  - Re-eval for possible wound vac placement on 1/27  - Offloading bed and boots recommended Patient should be on a Clinitron bed, with offloading   - Pain control  - Rest of care per primary team

## 2020-01-26 NOTE — PROGRESS NOTE ADULT - SUBJECTIVE AND OBJECTIVE BOX
seen for sacral decub, esrd, dysphagia    no acute complaints/events  ros negative      MEDICATIONS  (STANDING):  aspirin  chewable 81 milliGRAM(s) Oral daily  chlorhexidine 4% Liquid 1 Application(s) Topical <User Schedule>  collagenase Ointment 1 Application(s) Topical daily  Dakins Solution - 1/2 Strength 1 Application(s) Topical daily  dextrose 5%. 1000 milliLiter(s) (50 mL/Hr) IV Continuous <Continuous>  dextrose 50% Injectable 12.5 Gram(s) IV Push once  dextrose 50% Injectable 25 Gram(s) IV Push once  dextrose 50% Injectable 25 Gram(s) IV Push once  epoetin sara Injectable 07118 Unit(s) IV Push <User Schedule>  heparin  Injectable 5000 Unit(s) SubCutaneous every 8 hours  insulin lispro (HumaLOG) corrective regimen sliding scale   SubCutaneous every 6 hours  lactulose Syrup 10 Gram(s) Oral two times a day  pantoprazole  Injectable 40 milliGRAM(s) IV Push two times a day    MEDICATIONS  (PRN):  acetaminophen  Suppository .. 650 milliGRAM(s) Rectal every 6 hours PRN Temp greater or equal to 38C (100.4F), Mild Pain (1 - 3)  albuterol/ipratropium for Nebulization 3 milliLiter(s) Nebulizer every 6 hours PRN Shortness of Breath and/or Wheezing  dextrose 40% Gel 15 Gram(s) Oral once PRN Blood Glucose LESS THAN 70 milliGRAM(s)/deciLiter  glucagon  Injectable 1 milliGRAM(s) IntraMuscular once PRN Glucose <70 milliGRAM(s)/deciLiter  morphine  - Injectable 2 milliGRAM(s) IV Push every 4 hours PRN Severe Pain (7 - 10)  sodium chloride 0.9% lock flush 10 milliLiter(s) IV Push every 1 hour PRN Pre/post blood products, medications, blood draw, and to maintain line patency      Allergies    ACE inhibitors (Other (Mild))    Intolerances    oxycodone (Sedation/Somnol)      Vital Signs Last 24 Hrs  T(C): 36.9 (26 Jan 2020 07:53), Max: 36.9 (26 Jan 2020 07:53)  T(F): 98.4 (26 Jan 2020 07:53), Max: 98.4 (26 Jan 2020 07:53)  HR: 89 (26 Jan 2020 07:53) (82 - 91)  BP: 110/60 (26 Jan 2020 07:53) (110/60 - 116/57)  BP(mean): --  RR: 20 (26 Jan 2020 07:53) (18 - 20)  SpO2: 97% (26 Jan 2020 07:53) (97% - 99%)    PHYSICAL EXAM:    GENERAL: NAD  CHEST/LUNG: Clear to percussion bilaterally  HEART: Regular rate and rhythm; S1 S2  ABDOMEN: Soft, +peg Bowel sounds present  EXTREMITIES: edematous      LABS:                        8.2    11.46 )-----------( 377      ( 25 Jan 2020 16:09 )             25.3     01-25    136  |  98  |  34.0<H>  ----------------------------<  113<H>  3.3<L>   |  28.0  |  2.94<H>    Ca    7.8<L>      25 Jan 2020 16:09            CAPILLARY BLOOD GLUCOSE      POCT Blood Glucose.: 193 mg/dL (26 Jan 2020 07:18)  POCT Blood Glucose.: 228 mg/dL (26 Jan 2020 00:24)  POCT Blood Glucose.: 198 mg/dL (25 Jan 2020 11:52)        RADIOLOGY & ADDITIONAL TESTS:

## 2020-01-26 NOTE — PROGRESS NOTE ADULT - ASSESSMENT
65 yo female with hx of CHFpEF, CAD, CKD, HTN, DM2, cirrhosis, portal gastropathy, recurrent GI bleed, blind, sacral decub ulcer, presented to the ED from nursing home with encephalopathy, hypoxic resp failure requiring bipap, in ED found to have anemia with Hb 5 that responded to blood transfusions (hx of recurrent GI bleed for which multiple EGDs/colonoscopies have been performed, no further invasive work up as per GI). Also noted to have sepsis on admission that was secondary to PNA, possibly gram negative bacteria. Patient completed a course of zosyn. Also with KEITH on CKD now requiring HD via left IJ. Right chest tunnelled cath placed for access on 1/3/20. Patient failed speech and swallow and is NPO. NGT feeding was started and family to make now opting for PEG. Had upper ext mapping US showed left brachial veins DVT around the catheter and superficial right basilic and left cephalic vein thrombosis which vascular recommends full anticoagulation if not contraindicated due to GI bleed on admission; discussed with GI to hold on full dose anticoagulation for now.    #Unstageable Sacral Decub -   debridement bedside 1/19/20 and 1/21/2020  debridement in OR 1/23   possible wound vac 1/27  surgery following  c/w wound care    #Oropharyngeal dysphagia   - failed MBS abd swallow evaluation  - s/p PEG , tolerating TF    #Right pneumonia - suspected  aspiration  - treated initially abx restarted  for worsening infiltrate / CXR  - Abx completed     #bilateral superficial vein thrombosis on US doppler of upper ext and DVT on left brachial  vein around the catheter  - vascular rec anticoagulation due to GI bleed will hold off;  - poor candidate for full anticoagulation due to GI bleed on this admission and again in the past  - cont aspirin for now    #Acute on chronic diastolic CHF/ Acute hypoxic respiratory failure- improving   - cont HD for fluid removal   - Bipap as need.   -Supplemental oxygen via  NC comfortable     #Acute blood loss Anemia  - Hx of recurrent GI bleed? prior EGDs/ colonoscopies this year which were negative, prior CTA with +transverse colon bleed (Jan of 2019)  - Seen by GI on this admission no further work up   - repeat blood transfusion required for drop in hgb  - cont Protonix daily    #CASTAÑEDA Cirrhosis with portal gastropathy and AVMs  - change lactulose to standing through PEG with holding parameters  - c/w rifaximin  - cont PPI    #KEITH/ATN on CKD- started on new HD   - cont HD per nephrology   - nephrology follow up appreciated  - HD TTS schedule  - Needs AVF, outpt HD set up  - Continue QUENTIN , PRBC transfusion prn with HD    #DM2- cont sliding scale insulin coverage   - Cont sliding scale insulin coverage     Eventual discharge to [facility]; palliative care evaluation prior, however at this time family not for DNR/DNI and want all possible treatment options.  Pt needs AVF and HD seat.

## 2020-01-27 LAB
GLUCOSE BLDC GLUCOMTR-MCNC: 147 MG/DL — HIGH (ref 70–99)
GLUCOSE BLDC GLUCOMTR-MCNC: 215 MG/DL — HIGH (ref 70–99)
GLUCOSE BLDC GLUCOMTR-MCNC: 329 MG/DL — HIGH (ref 70–99)
GLUCOSE BLDC GLUCOMTR-MCNC: 356 MG/DL — HIGH (ref 70–99)

## 2020-01-27 PROCEDURE — 99231 SBSQ HOSP IP/OBS SF/LOW 25: CPT

## 2020-01-27 PROCEDURE — 99233 SBSQ HOSP IP/OBS HIGH 50: CPT

## 2020-01-27 PROCEDURE — 99232 SBSQ HOSP IP/OBS MODERATE 35: CPT

## 2020-01-27 RX ORDER — COLLAGENASE CLOSTRIDIUM HIST. 250 UNIT/G
1 OINTMENT (GRAM) TOPICAL
Refills: 0 | Status: DISCONTINUED | OUTPATIENT
Start: 2020-01-27 | End: 2020-02-03

## 2020-01-27 RX ADMIN — PANTOPRAZOLE SODIUM 40 MILLIGRAM(S): 20 TABLET, DELAYED RELEASE ORAL at 06:22

## 2020-01-27 RX ADMIN — HEPARIN SODIUM 5000 UNIT(S): 5000 INJECTION INTRAVENOUS; SUBCUTANEOUS at 06:22

## 2020-01-27 RX ADMIN — MORPHINE SULFATE 2 MILLIGRAM(S): 50 CAPSULE, EXTENDED RELEASE ORAL at 09:25

## 2020-01-27 RX ADMIN — PANTOPRAZOLE SODIUM 40 MILLIGRAM(S): 20 TABLET, DELAYED RELEASE ORAL at 17:17

## 2020-01-27 RX ADMIN — Medication 4: at 11:52

## 2020-01-27 RX ADMIN — MORPHINE SULFATE 2 MILLIGRAM(S): 50 CAPSULE, EXTENDED RELEASE ORAL at 22:52

## 2020-01-27 RX ADMIN — HEPARIN SODIUM 5000 UNIT(S): 5000 INJECTION INTRAVENOUS; SUBCUTANEOUS at 20:45

## 2020-01-27 RX ADMIN — Medication 81 MILLIGRAM(S): at 11:52

## 2020-01-27 RX ADMIN — HEPARIN SODIUM 5000 UNIT(S): 5000 INJECTION INTRAVENOUS; SUBCUTANEOUS at 14:05

## 2020-01-27 RX ADMIN — LACTULOSE 10 GRAM(S): 10 SOLUTION ORAL at 06:22

## 2020-01-27 RX ADMIN — Medication 1 APPLICATION(S): at 11:53

## 2020-01-27 RX ADMIN — Medication 1 APPLICATION(S): at 17:17

## 2020-01-27 RX ADMIN — Medication 8: at 16:53

## 2020-01-27 RX ADMIN — LACTULOSE 10 GRAM(S): 10 SOLUTION ORAL at 17:17

## 2020-01-27 RX ADMIN — Medication 10: at 04:26

## 2020-01-27 RX ADMIN — MORPHINE SULFATE 2 MILLIGRAM(S): 50 CAPSULE, EXTENDED RELEASE ORAL at 08:52

## 2020-01-27 NOTE — PROGRESS NOTE ADULT - ASSESSMENT
1) KEITH on CKD- now deemed ESRD  2) encephalopathy- improved  4) Acute blood loss anemia superimposed on anemia of CKD  5) Hypotension- BP now improved    HD TTS schedule  Needs outpt HD set up  Continue QUENTIN , PRBC transfusion prn with HD  Monitor lytes, BP and UOP    Will follow

## 2020-01-27 NOTE — PROGRESS NOTE ADULT - ASSESSMENT
65 yo F wit paraplegia presented from nursing home for Stage III-IV sacral decubitus ulcer being followed by surgery for wound care.    - BID dressing changes with dakins (0.5% dakins)  - Re-eval for possible wound vac placement on 1/27  - Offloading bed and boots recommended Patient should be on a Cl[place]ron bed, with offloading   - Pain control  - Rest of care per primary team 65 yo F wit paraplegia presented from nursing home for Stage III-IV sacral decubitus ulcer being followed by surgery for wound care.    - QD dressing change with santyl and dry kerlix  - Re-eval for possible wound vac placement on 1/30  - Offloading bed and boots recommended Patient should be on a Cl[place]ron bed, with offloading   - Pain control  - Rest of care per primary team

## 2020-01-27 NOTE — PROGRESS NOTE ADULT - SUBJECTIVE AND OBJECTIVE BOX
seen for esrd, gi bleed, sacral decub    no acute complaints  adjusted in bed with RN  ros negative     MEDICATIONS  (STANDING):  aspirin  chewable 81 milliGRAM(s) Oral daily  chlorhexidine 4% Liquid 1 Application(s) Topical <User Schedule>  collagenase Ointment 1 Application(s) Topical two times a day  Dakins Solution - 1/2 Strength 1 Application(s) Topical daily  dextrose 5%. 1000 milliLiter(s) (50 mL/Hr) IV Continuous <Continuous>  dextrose 50% Injectable 12.5 Gram(s) IV Push once  dextrose 50% Injectable 25 Gram(s) IV Push once  dextrose 50% Injectable 25 Gram(s) IV Push once  epoetin sara Injectable 68145 Unit(s) IV Push <User Schedule>  heparin  Injectable 5000 Unit(s) SubCutaneous every 8 hours  insulin lispro (HumaLOG) corrective regimen sliding scale   SubCutaneous every 6 hours  lactulose Syrup 10 Gram(s) Oral two times a day  pantoprazole  Injectable 40 milliGRAM(s) IV Push two times a day    MEDICATIONS  (PRN):  acetaminophen    Suspension .. 650 milliGRAM(s) Enteral Tube every 6 hours PRN Temp greater or equal to 38C (100.4F), Mild Pain (1 - 3), Moderate Pain (4 - 6)  albuterol/ipratropium for Nebulization 3 milliLiter(s) Nebulizer every 6 hours PRN Shortness of Breath and/or Wheezing  dextrose 40% Gel 15 Gram(s) Oral once PRN Blood Glucose LESS THAN 70 milliGRAM(s)/deciLiter  glucagon  Injectable 1 milliGRAM(s) IntraMuscular once PRN Glucose <70 milliGRAM(s)/deciLiter  morphine  - Injectable 2 milliGRAM(s) IV Push every 4 hours PRN Severe Pain (7 - 10)  sodium chloride 0.9% lock flush 10 milliLiter(s) IV Push every 1 hour PRN Pre/post blood products, medications, blood draw, and to maintain line patency      Allergies    ACE inhibitors (Other (Mild))    Intolerances    oxycodone (Sedation/Somnol)      Vital Signs Last 24 Hrs  T(C): 36.8 (27 Jan 2020 07:57), Max: 37 (26 Jan 2020 16:01)  T(F): 98.3 (27 Jan 2020 07:57), Max: 98.6 (26 Jan 2020 16:01)  HR: 83 (27 Jan 2020 07:57) (81 - 85)  BP: 103/53 (27 Jan 2020 07:57) (103/53 - 115/65)  BP(mean): --  RR: 20 (27 Jan 2020 07:57) (18 - 20)  SpO2: 98% (27 Jan 2020 07:57) (96% - 100%)    PHYSICAL EXAM:    GENERAL: NAD  CHEST/LUNG: Clear to percussion bilaterally  HEART: Regular rate and rhythm; S1 S2  ABDOMEN: Soft, +PEG  Bowel sounds present  EXTREMITIES: no edema     LABS:                        8.2    11.46 )-----------( 377      ( 25 Jan 2020 16:09 )             25.3     01-25    136  |  98  |  34.0<H>  ----------------------------<  113<H>  3.3<L>   |  28.0  |  2.94<H>    Ca    7.8<L>      25 Jan 2020 16:09            CAPILLARY BLOOD GLUCOSE      POCT Blood Glucose.: 215 mg/dL (27 Jan 2020 11:51)  POCT Blood Glucose.: 356 mg/dL (27 Jan 2020 04:23)  POCT Blood Glucose.: 218 mg/dL (26 Jan 2020 21:28)  POCT Blood Glucose.: 183 mg/dL (26 Jan 2020 16:34)        RADIOLOGY & ADDITIONAL TESTS:

## 2020-01-27 NOTE — PROGRESS NOTE ADULT - SUBJECTIVE AND OBJECTIVE BOX
[facility] DIVISION OF KIDNEY DISEASES AND HYPERTENSION -- HEMODIALYSIS NOTE  --------------------------------------------------------------------------------  Chief Complaint: ESRD/Ongoing hemodialysis requirement    24 hour events/subjective:        PAST HISTORY  --------------------------------------------------------------------------------  No significant changes to PMH, PSH, FHx, SHx, unless otherwise noted    ALLERGIES & MEDICATIONS  --------------------------------------------------------------------------------  Allergies    ACE inhibitors (Other (Mild))    Intolerances    oxycodone (Sedation/Somnol)    Standing Inpatient Medications  aspirin  chewable 81 milliGRAM(s) Oral daily  chlorhexidine 4% Liquid 1 Application(s) Topical <User Schedule>  collagenase Ointment 1 Application(s) Topical two times a day  Dakins Solution - 1/2 Strength 1 Application(s) Topical daily  dextrose 5%. 1000 milliLiter(s) IV Continuous <Continuous>  dextrose 50% Injectable 12.5 Gram(s) IV Push once  dextrose 50% Injectable 25 Gram(s) IV Push once  dextrose 50% Injectable 25 Gram(s) IV Push once  epoetin sara Injectable 14169 Unit(s) IV Push <User Schedule>  heparin  Injectable 5000 Unit(s) SubCutaneous every 8 hours  insulin lispro (HumaLOG) corrective regimen sliding scale   SubCutaneous every 6 hours  lactulose Syrup 10 Gram(s) Oral two times a day  pantoprazole  Injectable 40 milliGRAM(s) IV Push two times a day    PRN Inpatient Medications  acetaminophen    Suspension .. 650 milliGRAM(s) Enteral Tube every 6 hours PRN  albuterol/ipratropium for Nebulization 3 milliLiter(s) Nebulizer every 6 hours PRN  dextrose 40% Gel 15 Gram(s) Oral once PRN  glucagon  Injectable 1 milliGRAM(s) IntraMuscular once PRN  morphine  - Injectable 2 milliGRAM(s) IV Push every 4 hours PRN  sodium chloride 0.9% lock flush 10 milliLiter(s) IV Push every 1 hour PRN      REVIEW OF SYSTEMS  --------------------------------------------------------------------------------  Gen: No weight changes, fatigue, fevers/chills, weakness  Skin: No rashes  Head/Eyes/Ears/Mouth: No headache  Respiratory: No dyspnea, cough,  CV: No chest pain, orthopnea  GI: No abdominal pain, diarrhea, constipation, nausea, vomiting,  MSK: No joint pain  Neuro: No dizziness/lightheadedness, weakness  Heme: No bleeding  Psych: No significant nervousness, anxiety, stress, depression    All other systems were reviewed and are negative, except as noted.    VITALS/PHYSICAL EXAM  --------------------------------------------------------------------------------  T(C): 36.8 (01-27-20 @ 07:57), Max: 37 (01-26-20 @ 16:01)  HR: 83 (01-27-20 @ 07:57) (81 - 85)  BP: 103/53 (01-27-20 @ 07:57) (103/53 - 115/65)  RR: 20 (01-27-20 @ 07:57) (18 - 20)  SpO2: 98% (01-27-20 @ 07:57) (96% - 100%)  Wt(kg): --        01-26-20 @ 07:01  -  01-27-20 @ 07:00  --------------------------------------------------------  IN: 720 mL / OUT: 0 mL / NET: 720 mL      Physical Exam:  	Gen: NAD, well-appearing  	HEENT: PERRL, supple neck,  	Pulm: CTA B/L  	CV: RRR, S1S2; no rub  	Abd: +BS, soft, nontender  	UE: Warm, intact strength; no asterixis  	LE: Warm, + edema  	Neuro: No focal deficits  	Psych: Normal affect and mood  	Skin: Warm, without rashes  	Vascular access:    LABS/STUDIES  --------------------------------------------------------------------------------              8.2    11.46 >-----------<  377      [01-25-20 @ 16:09]              25.3     136  |  98  |  34.0  ----------------------------<  113      [01-25-20 @ 16:09]  3.3   |  28.0  |  2.94        Ca     7.8     [01-25-20 @ 16:09]            Iron 25, TIBC 154, %sat 16      [01-02-20 @ 09:39]  Ferritin 668      [01-02-20 @ 09:39]  PTH -- (Ca 8.0)      [01-02-20 @ 17:06]   119  PTH -- (Ca 9.1)      [05-30-19 @ 18:09]   107  Vitamin D (25OH) 25.2      [01-02-20 @ 17:06]  HbA1c 5.8      [12-26-19 @ 09:04]  TSH 1.10      [05-17-19 @ 12:21] [facility] DIVISION OF KIDNEY DISEASES AND HYPERTENSION -- HEMODIALYSIS NOTE  --------------------------------------------------------------------------------  Chief Complaint: ESRD/Ongoing hemodialysis requirement    24 hour events/subjective:  Pt seen and examined; no acute complaint      PAST HISTORY  --------------------------------------------------------------------------------  No significant changes to PMH, PSH, FHx, SHx, unless otherwise noted    ALLERGIES & MEDICATIONS  --------------------------------------------------------------------------------  Allergies    ACE inhibitors (Other (Mild))    Intolerances    oxycodone (Sedation/Somnol)    Standing Inpatient Medications  aspirin  chewable 81 milliGRAM(s) Oral daily  chlorhexidine 4% Liquid 1 Application(s) Topical <User Schedule>  collagenase Ointment 1 Application(s) Topical two times a day  Dakins Solution - 1/2 Strength 1 Application(s) Topical daily  dextrose 5%. 1000 milliLiter(s) IV Continuous <Continuous>  dextrose 50% Injectable 12.5 Gram(s) IV Push once  dextrose 50% Injectable 25 Gram(s) IV Push once  dextrose 50% Injectable 25 Gram(s) IV Push once  epoetin sara Injectable 48191 Unit(s) IV Push <User Schedule>  heparin  Injectable 5000 Unit(s) SubCutaneous every 8 hours  insulin lispro (HumaLOG) corrective regimen sliding scale   SubCutaneous every 6 hours  lactulose Syrup 10 Gram(s) Oral two times a day  pantoprazole  Injectable 40 milliGRAM(s) IV Push two times a day    PRN Inpatient Medications  acetaminophen    Suspension .. 650 milliGRAM(s) Enteral Tube every 6 hours PRN  albuterol/ipratropium for Nebulization 3 milliLiter(s) Nebulizer every 6 hours PRN  dextrose 40% Gel 15 Gram(s) Oral once PRN  glucagon  Injectable 1 milliGRAM(s) IntraMuscular once PRN  morphine  - Injectable 2 milliGRAM(s) IV Push every 4 hours PRN  sodium chloride 0.9% lock flush 10 milliLiter(s) IV Push every 1 hour PRN      REVIEW OF SYSTEMS  --------------------------------------------------------------------------------  Gen: No weight changes, fatigue, fevers/chills, weakness  Skin: No rashes  Head/Eyes/Ears/Mouth: No headache  Respiratory: No dyspnea, cough,  CV: No chest pain, orthopnea  GI: No abdominal pain, diarrhea, constipation, nausea, vomiting,  MSK: No joint pain  Neuro: No dizziness/lightheadedness, weakness  Heme: No bleeding  Psych: No significant nervousness, anxiety, stress, depression    All other systems were reviewed and are negative, except as noted.    VITALS/PHYSICAL EXAM  --------------------------------------------------------------------------------  T(C): 36.8 (01-27-20 @ 07:57), Max: 37 (01-26-20 @ 16:01)  HR: 83 (01-27-20 @ 07:57) (81 - 85)  BP: 103/53 (01-27-20 @ 07:57) (103/53 - 115/65)  RR: 20 (01-27-20 @ 07:57) (18 - 20)  SpO2: 98% (01-27-20 @ 07:57) (96% - 100%)  Wt(kg): --        01-26-20 @ 07:01  -  01-27-20 @ 07:00  --------------------------------------------------------  IN: 720 mL / OUT: 0 mL / NET: 720 mL      Physical Exam:  	Gen: NAD  	HEENT: supplemental O2 via NC  	Pulm: CTA B/L  	CV: RRR, S1S2; no rub  	Abd: +BS, soft, nontender  	UE: Warm, no edema  	LE: Warm, no edema  	Neuro: No focal deficits  	Psych: Normal affect and mood  	Skin: Warm, without rashes  	Vascular access: RIJ tunneled HD catheter    LABS/STUDIES  --------------------------------------------------------------------------------              8.2    11.46 >-----------<  377      [01-25-20 @ 16:09]              25.3     136  |  98  |  34.0  ----------------------------<  113      [01-25-20 @ 16:09]  3.3   |  28.0  |  2.94        Ca     7.8     [01-25-20 @ 16:09]            Iron 25, TIBC 154, %sat 16      [01-02-20 @ 09:39]  Ferritin 668      [01-02-20 @ 09:39]  PTH -- (Ca 8.0)      [01-02-20 @ 17:06]   119  PTH -- (Ca 9.1)      [05-30-19 @ 18:09]   107  Vitamin D (25OH) 25.2      [01-02-20 @ 17:06]  HbA1c 5.8      [12-26-19 @ 09:04]  TSH 1.10      [05-17-19 @ 12:21]

## 2020-01-27 NOTE — PROGRESS NOTE ADULT - ASSESSMENT
63 yo female with hx of CHFpEF, CAD, CKD, HTN, DM2, cirrhosis, portal gastropathy, recurrent GI bleed, blind, sacral decub ulcer, presented to the ED from nursing home with encephalopathy, hypoxic resp failure requiring bipap, in ED found to have anemia with Hb 5 that responded to blood transfusions (hx of recurrent GI bleed for which multiple EGDs/colonoscopies have been performed, no further invasive work up as per GI). Also noted to have sepsis on admission that was secondary to PNA, possibly gram negative bacteria. Patient completed a course of zosyn. Also with KEITH on CKD now requiring HD via left IJ. Right chest tunnelled cath placed for access on 1/3/20. Patient failed speech and swallow and is NPO. NGT feeding was started and family to make now opting for PEG. Had upper ext mapping US showed left brachial veins DVT around the catheter and superficial right basilic and left cephalic vein thrombosis which vascular recommends full anticoagulation if not contraindicated due to GI bleed on admission; discussed with GI to hold on full dose anticoagulation for now.    #Unstageable Sacral Decub -   debridement bedside 1/19/20 and 1/21/2020  debridement in OR 1/23   possible wound vac 1/30  surgery following  c/w wound care    #Oropharyngeal dysphagia   - failed MBS   - s/p PEG , tolerating TF    #Right pneumonia - suspected  aspiration  - treated initially abx restarted  for worsening infiltrate / CXR  - Abx completed     #bilateral superficial vein thrombosis on US doppler of upper ext and DVT on left brachial  vein around the catheter  - vascular rec anticoagulation due to GI bleed will hold off;  - poor candidate for full anticoagulation due to GI bleed on this admission and again in the past  - cont aspirin for now    #Acute on chronic diastolic CHF/ Acute hypoxic respiratory failure- improving   - cont HD for fluid removal   - Bipap as need.   -Supplemental oxygen via  NC comfortable     #Acute blood loss Anemia  - Hx of recurrent GI bleed? prior EGDs/ colonoscopies this year which were negative, prior CTA with +transverse colon bleed (Jan of 2019)  - Seen by GI on this admission no further work up   - repeat blood transfusion required for drop in hgb  - cont Protonix daily    #CASTAÑEDA Cirrhosis with portal gastropathy and AVMs  - change lactulose to standing through PEG with holding parameters  - c/w rifaximin  - cont PPI    #KEITH/ATN on CKD- started on new HD   - cont HD per nephrology   - nephrology follow up appreciated  - HD TTS schedule  - Needs AVF, outpt HD set up  - Continue QUENITN , PRBC transfusion prn with HD    #DM2- cont sliding scale insulin coverage   - Cont sliding scale insulin coverage     Eventual discharge to [facility]/NH; palliative care evaluation prior, however at this time family not for DNR/DNI and want all possible treatment options.    d/w renal--no contraindication for discharge.  d/w surgery--awaiting clearance re need for wound vac.

## 2020-01-27 NOTE — PROGRESS NOTE ADULT - SUBJECTIVE AND OBJECTIVE BOX
INTERVAL HPI/OVERNIGHT EVENTS: none    SUBJECTIVE:  Patient evaluated at bedside and found in no acute distress. BID dressing changes were completed. Pain is well controlled. Denies fever, chills, nausea, vomiting, chest pain, SOB, dizziness, abd pain or any other concerning symptoms    MEDICATIONS  (STANDING):  aspirin  chewable 81 milliGRAM(s) Oral daily  chlorhexidine 4% Liquid 1 Application(s) Topical <User Schedule>  Dakins Solution - 1/2 Strength 1 Application(s) Topical daily  dextrose 5%. 1000 milliLiter(s) (50 mL/Hr) IV Continuous <Continuous>  dextrose 50% Injectable 12.5 Gram(s) IV Push once  dextrose 50% Injectable 25 Gram(s) IV Push once  dextrose 50% Injectable 25 Gram(s) IV Push once  epoetin sara Injectable 64269 Unit(s) IV Push <User Schedule>  heparin  Injectable 5000 Unit(s) SubCutaneous every 8 hours  insulin lispro (HumaLOG) corrective regimen sliding scale   SubCutaneous every 6 hours  lactulose Syrup 10 Gram(s) Oral two times a day  pantoprazole  Injectable 40 milliGRAM(s) IV Push two times a day    MEDICATIONS  (PRN):  acetaminophen    Suspension .. 650 milliGRAM(s) Enteral Tube every 6 hours PRN Temp greater or equal to 38C (100.4F), Mild Pain (1 - 3), Moderate Pain (4 - 6)  albuterol/ipratropium for Nebulization 3 milliLiter(s) Nebulizer every 6 hours PRN Shortness of Breath and/or Wheezing  dextrose 40% Gel 15 Gram(s) Oral once PRN Blood Glucose LESS THAN 70 milliGRAM(s)/deciLiter  glucagon  Injectable 1 milliGRAM(s) IntraMuscular once PRN Glucose <70 milliGRAM(s)/deciLiter  morphine  - Injectable 2 milliGRAM(s) IV Push every 4 hours PRN Severe Pain (7 - 10)  sodium chloride 0.9% lock flush 10 milliLiter(s) IV Push every 1 hour PRN Pre/post blood products, medications, blood draw, and to maintain line patency      Vital Signs Last 24 Hrs  T(C): 36.6 (26 Jan 2020 23:17), Max: 37 (26 Jan 2020 16:01)  T(F): 97.9 (26 Jan 2020 23:17), Max: 98.6 (26 Jan 2020 16:01)  HR: 81 (26 Jan 2020 23:17) (81 - 89)  BP: 108/64 (26 Jan 2020 23:17) (108/64 - 115/65)  BP(mean): --  RR: 18 (26 Jan 2020 23:17) (18 - 20)  SpO2: 98% (26 Jan 2020 23:17) (97% - 100%)    PE  Gen: patient resting comfortably in bed, in no acute distress  Respiratory: no accessory muscle use, no conversational dyspnea  Cardiovascular: regular rate & rhythm  Gastrointestinal: Abdomen soft, non-tender, non-distended  Skin: Large sacral ulcer with some areas of fibrinous exudate, No purulent drainage or surrounding cellulitis to skin edges. Depth beyond subcutaneous fat, infiltrating muscle/bone. 9x13x1.5 cm ulceration  Musculoskeletal: Baseline paraplegia        I&O's Detail    25 Jan 2020 07:01  -  26 Jan 2020 07:00  --------------------------------------------------------  IN:    Enteral Tube Flush: 150 mL    Nepro: 720 mL    Packed Red Blood Cells: 500 mL  Total IN: 1370 mL    OUT:    Other: 900 mL  Total OUT: 900 mL    Total NET: 470 mL          LABS:                        8.2    11.46 )-----------( 377      ( 25 Jan 2020 16:09 )             25.3     01-25    136  |  98  |  34.0<H>  ----------------------------<  113<H>  3.3<L>   |  28.0  |  2.94<H>    Ca    7.8<L>      25 Jan 2020 16:09

## 2020-01-28 LAB
GLUCOSE BLDC GLUCOMTR-MCNC: 174 MG/DL — HIGH (ref 70–99)
GLUCOSE BLDC GLUCOMTR-MCNC: 193 MG/DL — HIGH (ref 70–99)
GLUCOSE BLDC GLUCOMTR-MCNC: 228 MG/DL — HIGH (ref 70–99)
GLUCOSE BLDC GLUCOMTR-MCNC: 269 MG/DL — HIGH (ref 70–99)

## 2020-01-28 PROCEDURE — 90937 HEMODIALYSIS REPEATED EVAL: CPT

## 2020-01-28 PROCEDURE — 99231 SBSQ HOSP IP/OBS SF/LOW 25: CPT

## 2020-01-28 PROCEDURE — 99232 SBSQ HOSP IP/OBS MODERATE 35: CPT

## 2020-01-28 RX ADMIN — HEPARIN SODIUM 5000 UNIT(S): 5000 INJECTION INTRAVENOUS; SUBCUTANEOUS at 21:01

## 2020-01-28 RX ADMIN — Medication 1 APPLICATION(S): at 12:09

## 2020-01-28 RX ADMIN — Medication 2: at 23:49

## 2020-01-28 RX ADMIN — PANTOPRAZOLE SODIUM 40 MILLIGRAM(S): 20 TABLET, DELAYED RELEASE ORAL at 06:02

## 2020-01-28 RX ADMIN — MORPHINE SULFATE 2 MILLIGRAM(S): 50 CAPSULE, EXTENDED RELEASE ORAL at 17:31

## 2020-01-28 RX ADMIN — LACTULOSE 10 GRAM(S): 10 SOLUTION ORAL at 18:05

## 2020-01-28 RX ADMIN — MORPHINE SULFATE 2 MILLIGRAM(S): 50 CAPSULE, EXTENDED RELEASE ORAL at 00:51

## 2020-01-28 RX ADMIN — Medication 2: at 12:09

## 2020-01-28 RX ADMIN — MORPHINE SULFATE 2 MILLIGRAM(S): 50 CAPSULE, EXTENDED RELEASE ORAL at 20:58

## 2020-01-28 RX ADMIN — Medication 4: at 18:04

## 2020-01-28 RX ADMIN — MORPHINE SULFATE 2 MILLIGRAM(S): 50 CAPSULE, EXTENDED RELEASE ORAL at 21:28

## 2020-01-28 RX ADMIN — MORPHINE SULFATE 2 MILLIGRAM(S): 50 CAPSULE, EXTENDED RELEASE ORAL at 16:44

## 2020-01-28 RX ADMIN — HEPARIN SODIUM 5000 UNIT(S): 5000 INJECTION INTRAVENOUS; SUBCUTANEOUS at 05:58

## 2020-01-28 RX ADMIN — ERYTHROPOIETIN 10000 UNIT(S): 10000 INJECTION, SOLUTION INTRAVENOUS; SUBCUTANEOUS at 13:46

## 2020-01-28 RX ADMIN — Medication 1 APPLICATION(S): at 18:05

## 2020-01-28 RX ADMIN — Medication 6: at 05:59

## 2020-01-28 RX ADMIN — Medication 81 MILLIGRAM(S): at 12:09

## 2020-01-28 RX ADMIN — Medication 1 APPLICATION(S): at 05:58

## 2020-01-28 RX ADMIN — PANTOPRAZOLE SODIUM 40 MILLIGRAM(S): 20 TABLET, DELAYED RELEASE ORAL at 18:06

## 2020-01-28 NOTE — PROGRESS NOTE ADULT - SUBJECTIVE AND OBJECTIVE BOX
seen for ESRD, dysphagia, sacral decub    no events/complaints.  ros otherwise negative     MEDICATIONS  (STANDING):  aspirin  chewable 81 milliGRAM(s) Oral daily  chlorhexidine 4% Liquid 1 Application(s) Topical <User Schedule>  collagenase Ointment 1 Application(s) Topical two times a day  Dakins Solution - 1/2 Strength 1 Application(s) Topical daily  dextrose 5%. 1000 milliLiter(s) (50 mL/Hr) IV Continuous <Continuous>  dextrose 50% Injectable 12.5 Gram(s) IV Push once  dextrose 50% Injectable 25 Gram(s) IV Push once  dextrose 50% Injectable 25 Gram(s) IV Push once  epoetin sara Injectable 34423 Unit(s) IV Push <User Schedule>  heparin  Injectable 5000 Unit(s) SubCutaneous every 8 hours  insulin lispro (HumaLOG) corrective regimen sliding scale   SubCutaneous every 6 hours  lactulose Syrup 10 Gram(s) Oral two times a day  pantoprazole  Injectable 40 milliGRAM(s) IV Push two times a day    MEDICATIONS  (PRN):  acetaminophen    Suspension .. 650 milliGRAM(s) Enteral Tube every 6 hours PRN Temp greater or equal to 38C (100.4F), Mild Pain (1 - 3), Moderate Pain (4 - 6)  albuterol/ipratropium for Nebulization 3 milliLiter(s) Nebulizer every 6 hours PRN Shortness of Breath and/or Wheezing  dextrose 40% Gel 15 Gram(s) Oral once PRN Blood Glucose LESS THAN 70 milliGRAM(s)/deciLiter  glucagon  Injectable 1 milliGRAM(s) IntraMuscular once PRN Glucose <70 milliGRAM(s)/deciLiter  morphine  - Injectable 2 milliGRAM(s) IV Push every 4 hours PRN Severe Pain (7 - 10)  sodium chloride 0.9% lock flush 10 milliLiter(s) IV Push every 1 hour PRN Pre/post blood products, medications, blood draw, and to maintain line patency      Allergies    ACE inhibitors (Other (Mild))    Intolerances    oxycodone (Sedation/Somnol)      Vital Signs Last 24 Hrs  T(C): 36.7 (28 Jan 2020 08:01), Max: 36.9 (27 Jan 2020 20:00)  T(F): 98 (28 Jan 2020 08:01), Max: 98.4 (27 Jan 2020 20:00)  HR: 82 (28 Jan 2020 08:01) (82 - 89)  BP: 103/62 (28 Jan 2020 08:01) (103/50 - 127/62)  BP(mean): --  RR: 19 (28 Jan 2020 08:01) (18 - 19)  SpO2: 97% (28 Jan 2020 08:01) (95% - 97%)    PHYSICAL EXAM:    GENERAL: NAD  CHEST/LUNG: Clear to percussion bilaterally  HEART: Regular rate and rhythm; S1 S2  ABDOMEN: Soft, Nontender, +peg Bowel sounds present  EXTREMITIES: trace edema    LABS:                CAPILLARY BLOOD GLUCOSE      POCT Blood Glucose.: 269 mg/dL (28 Jan 2020 05:57)  POCT Blood Glucose.: 147 mg/dL (27 Jan 2020 22:57)  POCT Blood Glucose.: 329 mg/dL (27 Jan 2020 16:51)  POCT Blood Glucose.: 215 mg/dL (27 Jan 2020 11:51)        RADIOLOGY & ADDITIONAL TESTS:

## 2020-01-28 NOTE — PROGRESS NOTE ADULT - ASSESSMENT
65 yo female with hx of CHFpEF, CAD, CKD, HTN, DM2, cirrhosis, portal gastropathy, recurrent GI bleed, blind, sacral decub ulcer, presented to the ED from nursing home with encephalopathy, hypoxic resp failure requiring bipap, in ED found to have anemia with Hb 5 that responded to blood transfusions (hx of recurrent GI bleed for which multiple EGDs/colonoscopies have been performed, no further invasive work up as per GI). Also noted to have sepsis on admission that was secondary to PNA, possibly gram negative bacteria. Patient completed a course of zosyn. Also with KEITH on CKD now requiring HD via left IJ. Right chest tunnelled cath placed for access on 1/3/20. Patient failed speech and swallow and is NPO. NGT feeding was started and family to make now opting for PEG. Had upper ext mapping US showed left brachial veins DVT around the catheter and superficial right basilic and left cephalic vein thrombosis which vascular recommends full anticoagulation if not contraindicated due to GI bleed on admission; discussed with GI to hold on full dose anticoagulation for now.    #Unstageable Sacral Decub -   debridement bedside 1/19/20 and 1/21/2020  debridement in OR 1/23   WOUND VAC at facility on 1/30  surgery following  c/w wound care    #Oropharyngeal dysphagia   - failed MBS   - s/p PEG , tolerating TF  -  wants repeat study as patient requesting food.     d/w speech MBS tomorrow.    #Right pneumonia - suspected  aspiration  - treated initially abx restarted  for worsening infiltrate / CXR  - Abx completed     #bilateral superficial vein thrombosis on US doppler of upper ext and DVT on left brachial  vein around the catheter  - vascular rec anticoagulation due to GI bleed will hold off;  - poor candidate for full anticoagulation due to GI bleed on this admission and again in the past  - cont aspirin for now    #Acute on chronic diastolic CHF/ Acute hypoxic respiratory failure- improving   - cont HD for fluid removal   - Bipap as need.   -Supplemental oxygen via  NC comfortable     #Acute blood loss Anemia  - Hx of recurrent GI bleed? prior EGDs/ colonoscopies this year which were negative, prior CTA with +transverse colon bleed (Jan of 2019)  - Seen by GI on this admission no further work up   - repeat blood transfusion required for drop in hgb  - cont Protonix daily    #CASTAÑEDA Cirrhosis with portal gastropathy and AVMs  - change lactulose to standing through PEG with holding parameters  - c/w rifaximin  - cont PPI    #KEITH/ATN on CKD- started on new HD   - cont HD per nephrology   - nephrology follow up appreciated  - HD TTS schedule  - Needs AVF, outpt HD set up  - Continue QUENTIN , PRBC transfusion prn with HD    #DM2- cont sliding scale insulin coverage   - Cont sliding scale insulin coverage     Eventual discharge to [facility]/NH; palliative care evaluation prior, however at this time family not for DNR/DNI and want all possible treatment options.    d/w renal--no contraindication for discharge.  d/w surgery--advised to continue local wound care while inpatient and wound vac in facility once discharged.

## 2020-01-28 NOTE — PROGRESS NOTE ADULT - ASSESSMENT
1) KEITH on CKD- now deemed ESRD  2) encephalopathy- improved  4) Acute blood loss anemia superimposed on anemia of CKD  5) Hypotension- BP now improved    HD TTS schedule - plan for HD today  Needs output HD set up  Continue QUENTIN , PRBC transfusion prn with HD  Monitor lytes, BP and UOP    Will follow

## 2020-01-28 NOTE — PROGRESS NOTE ADULT - ASSESSMENT
65 yo F wit paraplegia presented from nursing home for Stage III-IV sacral decubitus ulcer being followed by surgery for wound care.    - QD dressing change with santyl and dry kerlix  - Re-eval for possible wound vac placement on 1/30  - Offloading bed and boots recommended Patient should be on a [place] bed, with offloading   - Pain control  - Rest of care per primary team

## 2020-01-28 NOTE — PROGRESS NOTE ADULT - SUBJECTIVE AND OBJECTIVE BOX
[facility] DIVISION OF KIDNEY DISEASES AND HYPERTENSION -- FOLLOW UP NOTE  --------------------------------------------------------------------------------  Chief Complaint: ESRD/Ongoing hemodialysis requirement    24 hour events/subjective:  Patient seen and examined. Alert, NAD      PAST HISTORY  --------------------------------------------------------------------------------  No significant changes to PMH, PSH, FHx, SHx, unless otherwise noted    ALLERGIES & MEDICATIONS  --------------------------------------------------------------------------------  Allergies  ACE inhibitors (Other (Mild))    Intolerances  oxycodone (Sedation/Somnol)    Standing Inpatient Medications  aspirin  chewable 81 milliGRAM(s) Oral daily  chlorhexidine 4% Liquid 1 Application(s) Topical <User Schedule>  collagenase Ointment 1 Application(s) Topical two times a day  Dakins Solution - 1/2 Strength 1 Application(s) Topical daily  dextrose 5%. 1000 milliLiter(s) IV Continuous <Continuous>  dextrose 50% Injectable 12.5 Gram(s) IV Push once  dextrose 50% Injectable 25 Gram(s) IV Push once  dextrose 50% Injectable 25 Gram(s) IV Push once  epoetin sara Injectable 23515 Unit(s) IV Push <User Schedule>  heparin  Injectable 5000 Unit(s) SubCutaneous every 8 hours  insulin lispro (HumaLOG) corrective regimen sliding scale   SubCutaneous every 6 hours  lactulose Syrup 10 Gram(s) Oral two times a day  pantoprazole  Injectable 40 milliGRAM(s) IV Push two times a day    PRN Inpatient Medications  acetaminophen    Suspension .. 650 milliGRAM(s) Enteral Tube every 6 hours PRN  albuterol/ipratropium for Nebulization 3 milliLiter(s) Nebulizer every 6 hours PRN  dextrose 40% Gel 15 Gram(s) Oral once PRN  glucagon  Injectable 1 milliGRAM(s) IntraMuscular once PRN  morphine  - Injectable 2 milliGRAM(s) IV Push every 4 hours PRN  sodium chloride 0.9% lock flush 10 milliLiter(s) IV Push every 1 hour PRN      REVIEW OF SYSTEMS  --------------------------------------------------------------------------------  Gen: No weight changes, fatigue, fevers/chills, weakness  Skin: No rashes  Head/Eyes/Ears/Mouth: No headache; Normal hearing; Normal vision w/o blurriness  Respiratory: No dyspnea, cough, wheezing, hemoptysis  CV: No chest pain, PND, orthopnea  GI: No abdominal pain, diarrhea, constipation, nausea, vomiting, melena, hematochezia  : No increased frequency, dysuria, hematuria, nocturia  MSK: No joint pain/swelling; no back pain; no edema  Neuro: No dizziness/lightheadedness, weakness, seizures, numbness, tingling  Heme: No easy bruising or bleeding  Endo: No heat/cold intolerance  Psych: No significant nervousness, anxiety, stress, depression    All other systems were reviewed and are negative, except as noted.    VITALS/PHYSICAL EXAM  --------------------------------------------------------------------------------  T(C): 36.7 (01-28-20 @ 08:01), Max: 36.9 (01-27-20 @ 20:00)  HR: 82 (01-28-20 @ 08:01) (82 - 89)  BP: 103/62 (01-28-20 @ 08:01) (103/50 - 127/62)  RR: 19 (01-28-20 @ 08:01) (18 - 19)  SpO2: 97% (01-28-20 @ 08:01) (95% - 97%)      01-27-20 @ 07:01  -  01-28-20 @ 07:00  --------------------------------------------------------  IN: 870 mL / OUT: 0 mL / NET: 870 mL      Physical Exam:  	Gen: NAD  	HEENT: supplemental O2 via NC  	Pulm: CTA B/L  	CV: RRR, S1S2; no rub  	Abd: +BS, soft, nontender  	UE: Warm, no edema  	LE: Warm, no edema  	Neuro: No focal deficits  	Psych: Normal affect and mood  	Skin: Warm, without rashes  	Vascular access: RIJ tunneled HD catheter      LABS/STUDIES  --------------------------------------------------------------------------------  Creatinine Trend:  SCr 2.94 [01-25 @ 16:09]  SCr 2.68 [01-23 @ 08:26]  SCr 2.00 [01-22 @ 08:27]  SCr 3.04 [01-21 @ 12:34]  SCr 2.71 [01-20 @ 14:54]    Urinalysis - [01-13-20 @ 14:52]      Color Brown / Appearance Slightly Turbid / SG 1.020 / pH 6.5      Gluc Negative / Ketone Trace  / Bili Small / Urobili Negative       Blood Large / Protein 100 / Leuk Est Moderate / Nitrite Negative      RBC TNTC / WBC 26-50 / Hyaline  / Gran  / Sq Epi  / Non Sq Epi Occasional / Bacteria Moderate      Iron 25, TIBC 154, %sat 16      [01-02-20 @ 09:39]  Ferritin 668      [01-02-20 @ 09:39]  PTH -- (Ca 8.0)      [01-02-20 @ 17:06]   119  PTH -- (Ca 9.1)      [05-30-19 @ 18:09]   107  Vitamin D (25OH) 25.2      [01-02-20 @ 17:06]  HbA1c 5.8      [12-26-19 @ 09:04]  TSH 1.10      [05-17-19 @ 12:21]

## 2020-01-28 NOTE — PROGRESS NOTE ADULT - SUBJECTIVE AND OBJECTIVE BOX
HPI/OVERNIGHT EVENTS: No acute events overnight. AVSS. Daily dressing change with santyl completed this AM. Denies fever, chills, nausea, vomiting, chest pain, SOB, dizziness, abd pain or any other concerning symptoms    Vital Signs Last 24 Hrs  T(C): 36.9 (27 Jan 2020 20:00), Max: 36.9 (27 Jan 2020 20:00)  T(F): 98.4 (27 Jan 2020 20:00), Max: 98.4 (27 Jan 2020 20:00)  HR: 86 (27 Jan 2020 20:00) (83 - 89)  BP: 103/50 (27 Jan 2020 20:00) (103/50 - 127/62)  BP(mean): --  RR: 18 (27 Jan 2020 20:00) (18 - 20)  SpO2: 95% (27 Jan 2020 20:00) (95% - 98%)    I&O's Detail    26 Jan 2020 07:01  -  27 Jan 2020 07:00  --------------------------------------------------------  IN:    Nepro: 720 mL  Total IN: 720 mL    OUT:  Total OUT: 0 mL    Total NET: 720 mL      27 Jan 2020 07:01  -  28 Jan 2020 02:20  --------------------------------------------------------  IN:    Enteral Tube Flush: 150 mL    Nepro: 720 mL  Total IN: 870 mL    OUT:  Total OUT: 0 mL    Total NET: 870 mL          PE  Gen: patient resting comfortably in bed, in no acute distress  Respiratory: no accessory muscle use, no conversational dyspnea  Cardiovascular: regular rate & rhythm  Gastrointestinal: Abdomen soft, non-tender, non-distended  Skin: Large sacral ulcer with some areas of fibrinous exudate, No purulent drainage or surrounding cellulitis to skin edges. Depth beyond subcutaneous fat, infiltrating muscle/bone. 9x13x1.5 cm ulceration  Musculoskeletal: Baseline paraplegia          MEDICATIONS  (STANDING):  aspirin  chewable 81 milliGRAM(s) Oral daily  chlorhexidine 4% Liquid 1 Application(s) Topical <User Schedule>  collagenase Ointment 1 Application(s) Topical two times a day  Dakins Solution - 1/2 Strength 1 Application(s) Topical daily  dextrose 5%. 1000 milliLiter(s) (50 mL/Hr) IV Continuous <Continuous>  dextrose 50% Injectable 12.5 Gram(s) IV Push once  dextrose 50% Injectable 25 Gram(s) IV Push once  dextrose 50% Injectable 25 Gram(s) IV Push once  epoetin sara Injectable 12012 Unit(s) IV Push <User Schedule>  heparin  Injectable 5000 Unit(s) SubCutaneous every 8 hours  insulin lispro (HumaLOG) corrective regimen sliding scale   SubCutaneous every 6 hours  lactulose Syrup 10 Gram(s) Oral two times a day  pantoprazole  Injectable 40 milliGRAM(s) IV Push two times a day    MEDICATIONS  (PRN):  acetaminophen    Suspension .. 650 milliGRAM(s) Enteral Tube every 6 hours PRN Temp greater or equal to 38C (100.4F), Mild Pain (1 - 3), Moderate Pain (4 - 6)  albuterol/ipratropium for Nebulization 3 milliLiter(s) Nebulizer every 6 hours PRN Shortness of Breath and/or Wheezing  dextrose 40% Gel 15 Gram(s) Oral once PRN Blood Glucose LESS THAN 70 milliGRAM(s)/deciLiter  glucagon  Injectable 1 milliGRAM(s) IntraMuscular once PRN Glucose <70 milliGRAM(s)/deciLiter  morphine  - Injectable 2 milliGRAM(s) IV Push every 4 hours PRN Severe Pain (7 - 10)  sodium chloride 0.9% lock flush 10 milliLiter(s) IV Push every 1 hour PRN Pre/post blood products, medications, blood draw, and to maintain line patency

## 2020-01-28 NOTE — PROGRESS NOTE ADULT - ASSESSMENT
Patient was seen and evaluated on dialysis.   Patient is tolerating the procedure well.   T(C): 36.7 (01-28-20 @ 08:01), Max: 36.9 (01-27-20 @ 20:00)  HR: 82 (01-28-20 @ 08:01) (82 - 89)  BP: 103/62 (01-28-20 @ 08:01) (103/50 - 127/62)  Continue dialysis:   Dialyzer: Revaclear 300         QB: 400ml/min       QD: 500ml/min  Goal UF 1.0-1.5 L over 210 mins

## 2020-01-28 NOTE — PROGRESS NOTE ADULT - SUBJECTIVE AND OBJECTIVE BOX
[facility] DIVISION OF KIDNEY DISEASES AND HYPERTENSION -- HEMODIALYSIS NOTE  --------------------------------------------------------------------------------  Chief Complaint: ESRD/Ongoing hemodialysis requirement    24 hour events/subjective:  patient seen and examined on HD, tolerating well      PAST HISTORY  --------------------------------------------------------------------------------  No significant changes to PMH, PSH, FHx, SHx, unless otherwise noted    ALLERGIES & MEDICATIONS  --------------------------------------------------------------------------------  Allergies  ACE inhibitors (Other (Mild))    Intolerances  oxycodone (Sedation/Somnol)    Standing Inpatient Medications  aspirin  chewable 81 milliGRAM(s) Oral daily  chlorhexidine 4% Liquid 1 Application(s) Topical <User Schedule>  collagenase Ointment 1 Application(s) Topical two times a day  Dakins Solution - 1/2 Strength 1 Application(s) Topical daily  dextrose 5%. 1000 milliLiter(s) IV Continuous <Continuous>  dextrose 50% Injectable 12.5 Gram(s) IV Push once  dextrose 50% Injectable 25 Gram(s) IV Push once  dextrose 50% Injectable 25 Gram(s) IV Push once  epoetin sara Injectable 36800 Unit(s) IV Push <User Schedule>  heparin  Injectable 5000 Unit(s) SubCutaneous every 8 hours  insulin lispro (HumaLOG) corrective regimen sliding scale   SubCutaneous every 6 hours  lactulose Syrup 10 Gram(s) Oral two times a day  pantoprazole  Injectable 40 milliGRAM(s) IV Push two times a day    PRN Inpatient Medications  acetaminophen    Suspension .. 650 milliGRAM(s) Enteral Tube every 6 hours PRN  albuterol/ipratropium for Nebulization 3 milliLiter(s) Nebulizer every 6 hours PRN  dextrose 40% Gel 15 Gram(s) Oral once PRN  glucagon  Injectable 1 milliGRAM(s) IntraMuscular once PRN  morphine  - Injectable 2 milliGRAM(s) IV Push every 4 hours PRN  sodium chloride 0.9% lock flush 10 milliLiter(s) IV Push every 1 hour PRN      REVIEW OF SYSTEMS  --------------------------------------------------------------------------------  Please see other progress note from today    VITALS/PHYSICAL EXAM  --------------------------------------------------------------------------------  T(C): 36.7 (01-28-20 @ 08:01), Max: 36.9 (01-27-20 @ 20:00)  HR: 82 (01-28-20 @ 08:01) (82 - 89)  BP: 103/62 (01-28-20 @ 08:01) (103/50 - 127/62)  RR: 19 (01-28-20 @ 08:01) (18 - 19)  SpO2: 97% (01-28-20 @ 08:01) (95% - 97%)      01-27-20 @ 07:01  -  01-28-20 @ 07:00  --------------------------------------------------------  IN: 870 mL / OUT: 0 mL / NET: 870 mL    Physical Exam:  	Please see other progress note from today  	Vascular access: RIJ tunneled HD catheter    LABS/STUDIES  --------------------------------------------------------------------------------    Iron 25, TIBC 154, %sat 16      [01-02-20 @ 09:39]  Ferritin 668      [01-02-20 @ 09:39]  PTH -- (Ca 8.0)      [01-02-20 @ 17:06]   119  PTH -- (Ca 9.1)      [05-30-19 @ 18:09]   107  Vitamin D (25OH) 25.2      [01-02-20 @ 17:06]  HbA1c 5.8      [12-26-19 @ 09:04]  TSH 1.10      [05-17-19 @ 12:21]

## 2020-01-29 LAB
GLUCOSE BLDC GLUCOMTR-MCNC: 163 MG/DL — HIGH (ref 70–99)
GLUCOSE BLDC GLUCOMTR-MCNC: 219 MG/DL — HIGH (ref 70–99)
GLUCOSE BLDC GLUCOMTR-MCNC: 259 MG/DL — HIGH (ref 70–99)

## 2020-01-29 PROCEDURE — 99233 SBSQ HOSP IP/OBS HIGH 50: CPT

## 2020-01-29 PROCEDURE — 99231 SBSQ HOSP IP/OBS SF/LOW 25: CPT

## 2020-01-29 PROCEDURE — 99232 SBSQ HOSP IP/OBS MODERATE 35: CPT

## 2020-01-29 RX ADMIN — Medication 2: at 06:10

## 2020-01-29 RX ADMIN — MORPHINE SULFATE 2 MILLIGRAM(S): 50 CAPSULE, EXTENDED RELEASE ORAL at 21:37

## 2020-01-29 RX ADMIN — Medication 1 APPLICATION(S): at 16:22

## 2020-01-29 RX ADMIN — Medication 6: at 11:21

## 2020-01-29 RX ADMIN — HEPARIN SODIUM 5000 UNIT(S): 5000 INJECTION INTRAVENOUS; SUBCUTANEOUS at 06:09

## 2020-01-29 RX ADMIN — CHLORHEXIDINE GLUCONATE 1 APPLICATION(S): 213 SOLUTION TOPICAL at 06:10

## 2020-01-29 RX ADMIN — MORPHINE SULFATE 2 MILLIGRAM(S): 50 CAPSULE, EXTENDED RELEASE ORAL at 16:34

## 2020-01-29 RX ADMIN — Medication 81 MILLIGRAM(S): at 11:21

## 2020-01-29 RX ADMIN — Medication 4: at 16:22

## 2020-01-29 RX ADMIN — LACTULOSE 10 GRAM(S): 10 SOLUTION ORAL at 06:09

## 2020-01-29 RX ADMIN — MORPHINE SULFATE 2 MILLIGRAM(S): 50 CAPSULE, EXTENDED RELEASE ORAL at 16:49

## 2020-01-29 RX ADMIN — MORPHINE SULFATE 2 MILLIGRAM(S): 50 CAPSULE, EXTENDED RELEASE ORAL at 03:34

## 2020-01-29 RX ADMIN — MORPHINE SULFATE 2 MILLIGRAM(S): 50 CAPSULE, EXTENDED RELEASE ORAL at 11:21

## 2020-01-29 RX ADMIN — Medication 1 APPLICATION(S): at 06:09

## 2020-01-29 RX ADMIN — PANTOPRAZOLE SODIUM 40 MILLIGRAM(S): 20 TABLET, DELAYED RELEASE ORAL at 06:09

## 2020-01-29 RX ADMIN — HEPARIN SODIUM 5000 UNIT(S): 5000 INJECTION INTRAVENOUS; SUBCUTANEOUS at 21:38

## 2020-01-29 RX ADMIN — HEPARIN SODIUM 5000 UNIT(S): 5000 INJECTION INTRAVENOUS; SUBCUTANEOUS at 13:54

## 2020-01-29 RX ADMIN — LACTULOSE 10 GRAM(S): 10 SOLUTION ORAL at 16:23

## 2020-01-29 RX ADMIN — MORPHINE SULFATE 2 MILLIGRAM(S): 50 CAPSULE, EXTENDED RELEASE ORAL at 11:36

## 2020-01-29 RX ADMIN — MORPHINE SULFATE 2 MILLIGRAM(S): 50 CAPSULE, EXTENDED RELEASE ORAL at 22:00

## 2020-01-29 RX ADMIN — PANTOPRAZOLE SODIUM 40 MILLIGRAM(S): 20 TABLET, DELAYED RELEASE ORAL at 16:23

## 2020-01-29 RX ADMIN — MORPHINE SULFATE 2 MILLIGRAM(S): 50 CAPSULE, EXTENDED RELEASE ORAL at 03:04

## 2020-01-29 NOTE — PROGRESS NOTE ADULT - SUBJECTIVE AND OBJECTIVE BOX
[facility] DIVISION OF KIDNEY DISEASES AND HYPERTENSION -- HEMODIALYSIS NOTE  --------------------------------------------------------------------------------  Chief Complaint: ESRD/Ongoing hemodialysis requirement    24 hour events/subjective:  s/p HD yesterday, tolerated well    PAST HISTORY  --------------------------------------------------------------------------------  No significant changes to PMH, PSH, FHx, SHx, unless otherwise noted    ALLERGIES & MEDICATIONS  --------------------------------------------------------------------------------  Allergies    ACE inhibitors (Other (Mild))    Intolerances    oxycodone (Sedation/Somnol)    Standing Inpatient Medications  aspirin  chewable 81 milliGRAM(s) Oral daily  chlorhexidine 4% Liquid 1 Application(s) Topical <User Schedule>  collagenase Ointment 1 Application(s) Topical two times a day  Dakins Solution - 1/2 Strength 1 Application(s) Topical daily  dextrose 5%. 1000 milliLiter(s) IV Continuous <Continuous>  dextrose 50% Injectable 12.5 Gram(s) IV Push once  dextrose 50% Injectable 25 Gram(s) IV Push once  dextrose 50% Injectable 25 Gram(s) IV Push once  epoetin sara Injectable 00989 Unit(s) IV Push <User Schedule>  heparin  Injectable 5000 Unit(s) SubCutaneous every 8 hours  insulin lispro (HumaLOG) corrective regimen sliding scale   SubCutaneous every 6 hours  lactulose Syrup 10 Gram(s) Oral two times a day  pantoprazole  Injectable 40 milliGRAM(s) IV Push two times a day    PRN Inpatient Medications  acetaminophen    Suspension .. 650 milliGRAM(s) Enteral Tube every 6 hours PRN  albuterol/ipratropium for Nebulization 3 milliLiter(s) Nebulizer every 6 hours PRN  dextrose 40% Gel 15 Gram(s) Oral once PRN  glucagon  Injectable 1 milliGRAM(s) IntraMuscular once PRN  morphine  - Injectable 2 milliGRAM(s) IV Push every 4 hours PRN  sodium chloride 0.9% lock flush 10 milliLiter(s) IV Push every 1 hour PRN      REVIEW OF SYSTEMS  --------------------------------------------------------------------------------  Gen: No weight changes, fatigue, fevers/chills, weakness  Skin: No rashes  Head/Eyes/Ears/Mouth: No headache  Respiratory: No dyspnea, cough,  CV: No chest pain, orthopnea  GI: No abdominal pain, diarrhea, constipation, nausea, vomiting,  MSK: No joint pain  Neuro: No dizziness/lightheadedness, weakness  Heme: No bleeding  Psych: No significant nervousness, anxiety, stress, depression    All other systems were reviewed and are negative, except as noted.    VITALS/PHYSICAL EXAM  --------------------------------------------------------------------------------  T(C): 36.8 (01-29-20 @ 11:09), Max: 36.9 (01-28-20 @ 12:52)  HR: 89 (01-29-20 @ 12:00) (86 - 90)  BP: 120/70 (01-29-20 @ 12:00) (98/62 - 136/55)  RR: 16 (01-29-20 @ 12:00) (16 - 20)  SpO2: 99% (01-29-20 @ 12:00) (93% - 100%)  Wt(kg): --    Weight (kg): 82.9 (01-29-20 @ 09:40)      01-28-20 @ 07:01  -  01-29-20 @ 07:00  --------------------------------------------------------  IN: 1494 mL / OUT: 500 mL / NET: 994 mL      Physical Exam:  	Gen: NAD  	HEENT:  supple neck,  	Pulm: CTA B/L  	CV: RRR, S1S2; no rub  	Abd: +BS, soft, nontender  	UE: Warm,   	LE: Warm, no edema  	Neuro: No focal deficits  	Psych: Normal affect and mood  	Skin: Warm, without rashes  	Vascular access: RIJ tunneled HD cathetr    LABS/STUDIES  --------------------------------------------------------------------------------                Iron 25, TIBC 154, %sat 16      [01-02-20 @ 09:39]  Ferritin 668      [01-02-20 @ 09:39]  PTH -- (Ca 8.0)      [01-02-20 @ 17:06]   119  PTH -- (Ca 9.1)      [05-30-19 @ 18:09]   107  Vitamin D (25OH) 25.2      [01-02-20 @ 17:06]  HbA1c 5.8      [12-26-19 @ 09:04]  TSH 1.10      [05-17-19 @ 12:21]

## 2020-01-29 NOTE — PROVIDER CONTACT NOTE (OTHER) - RECOMMENDATIONS
Patient now amenable to MBS. As per note in care plan RN spoke with Adeola - Amalia who will attempt to get patient on MBS schedule for tomorrow.

## 2020-01-29 NOTE — PROGRESS NOTE ADULT - ASSESSMENT
63 yo female with hx of CHFpEF, CAD, CKD, HTN, DM2, cirrhosis, portal gastropathy, recurrent GI bleed, blind, sacral decub ulcer, presented to the ED from nursing home with encephalopathy, hypoxic resp failure requiring bipap, in ED found to have anemia with Hb 5 that responded to blood transfusions (hx of recurrent GI bleed for which multiple EGDs/colonoscopies have been performed, no further invasive work up as per GI). Also noted to have sepsis on admission that was secondary to PNA, possibly gram negative bacteria. Patient completed a course of zosyn. Also with KEITH on CKD now requiring HD via left IJ. Right chest tunnelled cath placed for access on 1/3/20. Patient failed speech and swallow and is NPO. NGT feeding was started and family to make now opting for PEG. Had upper ext mapping US showed left brachial veins DVT around the catheter and superficial right basilic and left cephalic vein thrombosis which vascular recommends full anticoagulation if not contraindicated due to GI bleed on admission; discussed with GI to hold on full dose anticoagulation for now.    #Unstageable Sacral Decub -   debridement bedside 1/19/20 and 1/21/2020  debridement in OR 1/23   WOUND VAC at facility  surgery following  c/w wound care    #Oropharyngeal dysphagia   - failed MBS   - s/p PEG , tolerating TF  -  wants repeat study as patient requesting food.      patient declined repeat MBS.    #Right pneumonia - suspected  aspiration  - treated initially abx restarted  for worsening infiltrate / CXR  - Abx completed     #bilateral superficial vein thrombosis on US doppler of upper ext and DVT on left brachial  vein around the catheter  - vascular rec anticoagulation due to GI bleed will hold off;  - poor candidate for full anticoagulation due to GI bleed on this admission and again in the past  - cont aspirin for now    #Acute on chronic diastolic CHF/ Acute hypoxic respiratory failure- improving   - cont HD for fluid removal   - Bipap as need.   -Supplemental oxygen via  NC comfortable     #Acute blood loss Anemia  - Hx of recurrent GI bleed? prior EGDs/ colonoscopies this year which were negative, prior CTA with +transverse colon bleed (Jan of 2019)  - Seen by GI on this admission no further work up   - repeat blood transfusion required for drop in hgb  - cont Protonix daily    #CASTAÑEDA Cirrhosis with portal gastropathy and AVMs  - change lactulose to standing through PEG with holding parameters  - c/w rifaximin  - cont PPI    #KEITH/ATN on CKD- started on new HD   - cont HD per nephrology   - nephrology follow up appreciated  - HD TTS schedule  - needs outpt HD set up  - Continue QUENTIN , PRBC transfusion prn with HD    #DM2- cont sliding scale insulin coverage   - Cont sliding scale insulin coverage     Eventual discharge to [facility]/NH; palliative care evaluation prior, however at this time family not for DNR/DNI and want all possible treatment options.    d/w renal--no contraindication for discharge.  d/w surgery--advised to continue local wound care while inpatient and wound vac in facility once discharged.       per surgery :. Black granu-foam sponge to be used to the wound. Wound vac setting to 125mmHg.      Patient slated for discharge per primary team and accepting facility can place wound vac per dimensions noted above. 63 yo female with hx of CHFpEF, CAD, CKD, HTN, DM2, cirrhosis, portal gastropathy, recurrent GI bleed, blind, sacral decub ulcer, presented to the ED from nursing home with encephalopathy, hypoxic resp failure requiring bipap, in ED found to have anemia with Hb 5 that responded to blood transfusions (hx of recurrent GI bleed for which multiple EGDs/colonoscopies have been performed, no further invasive work up as per GI). Also noted to have sepsis on admission that was secondary to PNA, possibly gram negative bacteria. Patient completed a course of zosyn. Also with KEITH on CKD now requiring HD via left IJ. Right chest tunnelled cath placed for access on 1/3/20. Patient failed speech and swallow and is NPO. NGT feeding was started and family to make now opting for PEG. Had upper ext mapping US showed left brachial veins DVT around the catheter and superficial right basilic and left cephalic vein thrombosis which vascular recommends full anticoagulation if not contraindicated due to GI bleed on admission; discussed with GI to hold on full dose anticoagulation for now.    #Unstageable Sacral Decub -   debridement bedside 1/19/20 and 1/21/2020  debridement in OR 1/23   WOUND VAC at facility  surgery following  c/w wound care    #Oropharyngeal dysphagia   - failed MBS   - s/p PEG , tolerating TF  -  wants repeat study as patient requesting food.      patient declined repeat MBS.    #Right pneumonia - suspected  aspiration  - treated initially abx restarted  for worsening infiltrate / CXR  - Abx completed   - has not been using bipap nor requires it     #bilateral superficial vein thrombosis on US doppler of upper ext and DVT on left brachial  vein around the catheter  - vascular rec anticoagulation due to GI bleed will hold off;  - poor candidate for full anticoagulation due to GI bleed on this admission and again in the past  - cont aspirin for now    #Acute on chronic diastolic CHF/ Acute hypoxic respiratory failure- improving   - cont HD for fluid removal   - Bipap as need.   -Supplemental oxygen via  NC comfortable     #Acute blood loss Anemia  - Hx of recurrent GI bleed? prior EGDs/ colonoscopies this year which were negative, prior CTA with +transverse colon bleed (Jan of 2019)  - Seen by GI on this admission no further work up   - repeat blood transfusion required for drop in hgb  - cont Protonix daily    #CASTAÑEDA Cirrhosis with portal gastropathy and AVMs  - change lactulose to standing through PEG with holding parameters  - c/w rifaximin  - cont PPI    #KEITH/ATN on CKD- started on new HD   - cont HD per nephrology   - nephrology follow up appreciated  - HD TTS schedule  - needs outpt HD set up  - Continue QUENTIN , PRBC transfusion prn with HD    #DM2- cont sliding scale insulin coverage   - Cont sliding scale insulin coverage     Eventual discharge to [facility]/NH; palliative care evaluation prior, however at this time family not for DNR/DNI and want all possible treatment options.    d/w renal--no contraindication for discharge.  d/w surgery--advised to continue local wound care while inpatient and wound vac in facility once discharged.       per surgery :. Black granu-foam sponge to be used to the wound. Wound vac setting to 125mmHg.      Patient slated for discharge per primary team and accepting facility can place wound vac per dimensions noted above.

## 2020-01-29 NOTE — PROGRESS NOTE ADULT - SUBJECTIVE AND OBJECTIVE BOX
HPI/OVERNIGHT EVENTS: No acute events overnight. AVSS. Daily dressing change with santyl completed this AM. Denies fever, chills, nausea, vomiting, chest pain, SOB, dizziness, abd pain or any other concerning symptoms    PE  Gen: patient resting comfortably in bed, in no acute distress  Respiratory: no accessory muscle use, no conversational dyspnea  Cardiovascular: regular rate & rhythm  Gastrointestinal: Abdomen soft, non-tender, non-distended  Skin: Large sacral ulcer with some areas of fibrinous exudate, No purulent drainage or surrounding cellulitis to skin edges. Depth beyond subcutaneous fat, infiltrating muscle/bone. 9x13x1.5 cm ulceration  Musculoskeletal: Baseline paraplegia HPI/OVERNIGHT EVENTS: No acute events overnight. AVSS. Daily dressing change with santyl completed this AM. Denies fever, chills, nausea, vomiting, chest pain, SOB, dizziness, abd pain or any other concerning symptoms    PE  Gen: patient resting comfortably in bed, in no acute distress  Respiratory: no accessory muscle use, no conversational dyspnea  Cardiovascular: regular rate & rhythm  Gastrointestinal: Abdomen soft, non-tender, non-distended  Skin: Large sacral ulcer with some areas of fibrinous exudate, No purulent drainage or surrounding cellulitis to skin edges. Depth beyond subcutaneous fat, infiltrating muscle/bone. 8x11.5x1.5 cm ulceration  Musculoskeletal: Baseline paraplegia

## 2020-01-29 NOTE — PROGRESS NOTE ADULT - ASSESSMENT
63 yo F wit paraplegia presented from nursing home for Stage III-IV sacral decubitus ulcer being followed by surgery for wound care.    - QD dressing change with santyl and dry kerlix  - Re-eval for possible wound vac placement on 1/30  - Offloading bed and boots recommended Patient should be on a [place] bed, with offloading   - Pain control  - Rest of care per primary team

## 2020-01-29 NOTE — CHART NOTE - NSCHARTNOTEFT_GEN_A_CORE
Patient currently with Santyl, and wet to dry dressing of sacral wound. Wound measures 11.5x8x1.5 cm. Black granu-foam sponge to be used to the wound.   Patient slated for discharge per primary team and accepting facility can place wound vac per dimensions noted above. Patient currently with Santyl, and wet to dry dressing of sacral wound. Wound measures 11.5x8x1.5 cm. Black granu-foam sponge to be used to the wound. Wound vac setting to 125mmHg.  Patient slated for discharge per primary team and accepting facility can place wound vac per dimensions noted above.

## 2020-01-29 NOTE — CHART NOTE - NSCHARTNOTESELECT_GEN_ALL_CORE
David review the attached FMLA form request.  Please respond to mom with the plan.  Leonie Mckinney RN  Message handled by Nurse Triage.     Wound Vac Dimensions

## 2020-01-29 NOTE — PROVIDER CONTACT NOTE (OTHER) - SITUATION
Per provider to RN order - make family aware that patient refused MBS.     Patient's  Pito was at bedside earlier today and RN discussed with patient and  importance of MBS.

## 2020-01-29 NOTE — PROGRESS NOTE ADULT - ASSESSMENT
1) KEITH on CKD- now deemed ESRD  2) encephalopathy- improved  4) Acute blood loss anemia superimposed on anemia of CKD  5) Hypotension- BP now improved    HD TTS schedule - plan for HD tomorrow  Needs output HD set up  Continue QUENTIN , PRBC transfusion prn with HD  Monitor lytes, BP and UOP    Will follow

## 2020-01-30 ENCOUNTER — TRANSCRIPTION ENCOUNTER (OUTPATIENT)
Age: 65
End: 2020-01-30

## 2020-01-30 LAB
ANION GAP SERPL CALC-SCNC: 10 MMOL/L — SIGNIFICANT CHANGE UP (ref 5–17)
BUN SERPL-MCNC: 44 MG/DL — HIGH (ref 8–20)
CALCIUM SERPL-MCNC: 8.2 MG/DL — LOW (ref 8.6–10.2)
CHLORIDE SERPL-SCNC: 94 MMOL/L — LOW (ref 98–107)
CO2 SERPL-SCNC: 29 MMOL/L — SIGNIFICANT CHANGE UP (ref 22–29)
CREAT SERPL-MCNC: 2.2 MG/DL — HIGH (ref 0.5–1.3)
GLUCOSE BLDC GLUCOMTR-MCNC: 126 MG/DL — HIGH (ref 70–99)
GLUCOSE BLDC GLUCOMTR-MCNC: 140 MG/DL — HIGH (ref 70–99)
GLUCOSE BLDC GLUCOMTR-MCNC: 206 MG/DL — HIGH (ref 70–99)
GLUCOSE BLDC GLUCOMTR-MCNC: 215 MG/DL — HIGH (ref 70–99)
GLUCOSE BLDC GLUCOMTR-MCNC: 227 MG/DL — HIGH (ref 70–99)
GLUCOSE BLDC GLUCOMTR-MCNC: 229 MG/DL — HIGH (ref 70–99)
GLUCOSE SERPL-MCNC: 193 MG/DL — HIGH (ref 70–99)
HCT VFR BLD CALC: 28.3 % — LOW (ref 34.5–45)
HGB BLD-MCNC: 9 G/DL — LOW (ref 11.5–15.5)
MCHC RBC-ENTMCNC: 27.1 PG — SIGNIFICANT CHANGE UP (ref 27–34)
MCHC RBC-ENTMCNC: 31.8 GM/DL — LOW (ref 32–36)
MCV RBC AUTO: 85.2 FL — SIGNIFICANT CHANGE UP (ref 80–100)
PLATELET # BLD AUTO: 348 K/UL — SIGNIFICANT CHANGE UP (ref 150–400)
POTASSIUM SERPL-MCNC: 3.8 MMOL/L — SIGNIFICANT CHANGE UP (ref 3.5–5.3)
POTASSIUM SERPL-SCNC: 3.8 MMOL/L — SIGNIFICANT CHANGE UP (ref 3.5–5.3)
RBC # BLD: 3.32 M/UL — LOW (ref 3.8–5.2)
RBC # FLD: 18.8 % — HIGH (ref 10.3–14.5)
SODIUM SERPL-SCNC: 133 MMOL/L — LOW (ref 135–145)
WBC # BLD: 7.18 K/UL — SIGNIFICANT CHANGE UP (ref 3.8–10.5)
WBC # FLD AUTO: 7.18 K/UL — SIGNIFICANT CHANGE UP (ref 3.8–10.5)

## 2020-01-30 PROCEDURE — 99231 SBSQ HOSP IP/OBS SF/LOW 25: CPT | Mod: GC

## 2020-01-30 PROCEDURE — 99232 SBSQ HOSP IP/OBS MODERATE 35: CPT

## 2020-01-30 PROCEDURE — 90937 HEMODIALYSIS REPEATED EVAL: CPT

## 2020-01-30 RX ORDER — MICONAZOLE NITRATE 2 %
1 CREAM (GRAM) TOPICAL
Qty: 0 | Refills: 0 | DISCHARGE

## 2020-01-30 RX ORDER — TRAMADOL HYDROCHLORIDE 50 MG/1
1 TABLET ORAL
Qty: 0 | Refills: 0 | DISCHARGE

## 2020-01-30 RX ORDER — BARRIER CREAM 200; 4.5 MG/G; MG/G
1 CREAM TOPICAL
Qty: 0 | Refills: 0 | DISCHARGE

## 2020-01-30 RX ORDER — CLOPIDOGREL BISULFATE 75 MG/1
1 TABLET, FILM COATED ORAL
Qty: 0 | Refills: 0 | DISCHARGE

## 2020-01-30 RX ORDER — LANOLIN ALCOHOL/MO/W.PET/CERES
1 CREAM (GRAM) TOPICAL
Qty: 0 | Refills: 0 | DISCHARGE

## 2020-01-30 RX ORDER — LANOLIN/MINERAL OIL
1 LOTION (ML) TOPICAL
Qty: 0 | Refills: 0 | DISCHARGE

## 2020-01-30 RX ORDER — ASPIRIN/CALCIUM CARB/MAGNESIUM 324 MG
1 TABLET ORAL
Qty: 0 | Refills: 0 | DISCHARGE
Start: 2020-01-30

## 2020-01-30 RX ORDER — PIPERACILLIN AND TAZOBACTAM 4; .5 G/20ML; G/20ML
3.38 INJECTION, POWDER, LYOPHILIZED, FOR SOLUTION INTRAVENOUS
Qty: 0 | Refills: 0 | DISCHARGE

## 2020-01-30 RX ORDER — COLLAGENASE CLOSTRIDIUM HIST. 250 UNIT/G
1 OINTMENT (GRAM) TOPICAL
Qty: 0 | Refills: 0 | DISCHARGE
Start: 2020-01-30

## 2020-01-30 RX ORDER — OCTREOTIDE ACETATE 200 UG/ML
20 INJECTION, SOLUTION INTRAVENOUS; SUBCUTANEOUS
Qty: 0 | Refills: 0 | DISCHARGE

## 2020-01-30 RX ORDER — LIDOCAINE 4 G/100G
1 CREAM TOPICAL
Qty: 0 | Refills: 0 | DISCHARGE

## 2020-01-30 RX ORDER — BUDESONIDE, MICRONIZED 100 %
2 POWDER (GRAM) MISCELLANEOUS
Qty: 0 | Refills: 0 | DISCHARGE

## 2020-01-30 RX ORDER — SODIUM HYPOCHLORITE 0.125 %
1 SOLUTION, NON-ORAL MISCELLANEOUS
Qty: 0 | Refills: 0 | DISCHARGE

## 2020-01-30 RX ORDER — GABAPENTIN 400 MG/1
1 CAPSULE ORAL
Qty: 0 | Refills: 0 | DISCHARGE

## 2020-01-30 RX ORDER — LACTULOSE 10 G/15ML
15 SOLUTION ORAL
Qty: 0 | Refills: 0 | DISCHARGE
Start: 2020-01-30

## 2020-01-30 RX ORDER — ACETAMINOPHEN 500 MG
3 TABLET ORAL
Qty: 0 | Refills: 0 | DISCHARGE

## 2020-01-30 RX ORDER — PANTOPRAZOLE SODIUM 20 MG/1
1 TABLET, DELAYED RELEASE ORAL
Qty: 0 | Refills: 0 | DISCHARGE

## 2020-01-30 RX ORDER — PANTOPRAZOLE SODIUM 20 MG/1
40 TABLET, DELAYED RELEASE ORAL
Qty: 0 | Refills: 0 | DISCHARGE

## 2020-01-30 RX ORDER — ACETAMINOPHEN 500 MG
20.31 TABLET ORAL
Qty: 0 | Refills: 0 | DISCHARGE
Start: 2020-01-30

## 2020-01-30 RX ADMIN — MORPHINE SULFATE 2 MILLIGRAM(S): 50 CAPSULE, EXTENDED RELEASE ORAL at 11:17

## 2020-01-30 RX ADMIN — Medication 4: at 00:12

## 2020-01-30 RX ADMIN — HEPARIN SODIUM 5000 UNIT(S): 5000 INJECTION INTRAVENOUS; SUBCUTANEOUS at 20:19

## 2020-01-30 RX ADMIN — LACTULOSE 10 GRAM(S): 10 SOLUTION ORAL at 17:47

## 2020-01-30 RX ADMIN — HEPARIN SODIUM 5000 UNIT(S): 5000 INJECTION INTRAVENOUS; SUBCUTANEOUS at 14:53

## 2020-01-30 RX ADMIN — MORPHINE SULFATE 2 MILLIGRAM(S): 50 CAPSULE, EXTENDED RELEASE ORAL at 20:39

## 2020-01-30 RX ADMIN — MORPHINE SULFATE 2 MILLIGRAM(S): 50 CAPSULE, EXTENDED RELEASE ORAL at 01:38

## 2020-01-30 RX ADMIN — Medication 1 APPLICATION(S): at 17:47

## 2020-01-30 RX ADMIN — HEPARIN SODIUM 5000 UNIT(S): 5000 INJECTION INTRAVENOUS; SUBCUTANEOUS at 05:35

## 2020-01-30 RX ADMIN — MORPHINE SULFATE 2 MILLIGRAM(S): 50 CAPSULE, EXTENDED RELEASE ORAL at 11:08

## 2020-01-30 RX ADMIN — CHLORHEXIDINE GLUCONATE 1 APPLICATION(S): 213 SOLUTION TOPICAL at 05:36

## 2020-01-30 RX ADMIN — Medication 4: at 05:35

## 2020-01-30 RX ADMIN — MORPHINE SULFATE 2 MILLIGRAM(S): 50 CAPSULE, EXTENDED RELEASE ORAL at 02:00

## 2020-01-30 RX ADMIN — Medication 81 MILLIGRAM(S): at 14:52

## 2020-01-30 RX ADMIN — PANTOPRAZOLE SODIUM 40 MILLIGRAM(S): 20 TABLET, DELAYED RELEASE ORAL at 05:35

## 2020-01-30 RX ADMIN — MORPHINE SULFATE 2 MILLIGRAM(S): 50 CAPSULE, EXTENDED RELEASE ORAL at 06:15

## 2020-01-30 RX ADMIN — Medication 1 APPLICATION(S): at 05:36

## 2020-01-30 RX ADMIN — ERYTHROPOIETIN 10000 UNIT(S): 10000 INJECTION, SOLUTION INTRAVENOUS; SUBCUTANEOUS at 10:07

## 2020-01-30 RX ADMIN — PANTOPRAZOLE SODIUM 40 MILLIGRAM(S): 20 TABLET, DELAYED RELEASE ORAL at 17:47

## 2020-01-30 RX ADMIN — MORPHINE SULFATE 2 MILLIGRAM(S): 50 CAPSULE, EXTENDED RELEASE ORAL at 06:30

## 2020-01-30 RX ADMIN — Medication 1 APPLICATION(S): at 14:53

## 2020-01-30 RX ADMIN — Medication 4: at 17:46

## 2020-01-30 RX ADMIN — MORPHINE SULFATE 2 MILLIGRAM(S): 50 CAPSULE, EXTENDED RELEASE ORAL at 11:16

## 2020-01-30 RX ADMIN — Medication 4: at 23:26

## 2020-01-30 RX ADMIN — MORPHINE SULFATE 2 MILLIGRAM(S): 50 CAPSULE, EXTENDED RELEASE ORAL at 20:09

## 2020-01-30 NOTE — DISCHARGE NOTE PROVIDER - CARE PROVIDERS DIRECT ADDRESSES
,[email or web address]

## 2020-01-30 NOTE — PROGRESS NOTE ADULT - SUBJECTIVE AND OBJECTIVE BOX
seen for encephalopathy, esrd, sacral decub    no events tolerating HD  ros negative    MEDICATIONS  (STANDING):  aspirin  chewable 81 milliGRAM(s) Oral daily  chlorhexidine 4% Liquid 1 Application(s) Topical <User Schedule>  collagenase Ointment 1 Application(s) Topical two times a day  Dakins Solution - 1/2 Strength 1 Application(s) Topical daily  dextrose 5%. 1000 milliLiter(s) (50 mL/Hr) IV Continuous <Continuous>  dextrose 50% Injectable 12.5 Gram(s) IV Push once  dextrose 50% Injectable 25 Gram(s) IV Push once  dextrose 50% Injectable 25 Gram(s) IV Push once  epoetin sara Injectable 90006 Unit(s) IV Push <User Schedule>  heparin  Injectable 5000 Unit(s) SubCutaneous every 8 hours  insulin lispro (HumaLOG) corrective regimen sliding scale   SubCutaneous every 6 hours  lactulose Syrup 10 Gram(s) Oral two times a day  pantoprazole  Injectable 40 milliGRAM(s) IV Push two times a day    MEDICATIONS  (PRN):  acetaminophen    Suspension .. 650 milliGRAM(s) Enteral Tube every 6 hours PRN Temp greater or equal to 38C (100.4F), Mild Pain (1 - 3), Moderate Pain (4 - 6)  albuterol/ipratropium for Nebulization 3 milliLiter(s) Nebulizer every 6 hours PRN Shortness of Breath and/or Wheezing  dextrose 40% Gel 15 Gram(s) Oral once PRN Blood Glucose LESS THAN 70 milliGRAM(s)/deciLiter  glucagon  Injectable 1 milliGRAM(s) IntraMuscular once PRN Glucose <70 milliGRAM(s)/deciLiter  morphine  - Injectable 2 milliGRAM(s) IV Push every 4 hours PRN Severe Pain (7 - 10)  sodium chloride 0.9% lock flush 10 milliLiter(s) IV Push every 1 hour PRN Pre/post blood products, medications, blood draw, and to maintain line patency      Allergies    ACE inhibitors (Other (Mild))    Intolerances    oxycodone (Sedation/Somnol)      Vital Signs Last 24 Hrs  T(C): 36.9 (30 Jan 2020 13:36), Max: 37 (30 Jan 2020 07:35)  T(F): 98.4 (30 Jan 2020 13:36), Max: 98.6 (30 Jan 2020 07:35)  HR: 869 (30 Jan 2020 13:36) (86 - 869)  BP: 151/39 (30 Jan 2020 13:36) (102/64 - 151/39)  BP(mean): --  RR: 18 (30 Jan 2020 13:36) (18 - 18)  SpO2: 100% (30 Jan 2020 13:36) (94% - 100%)    PHYSICAL EXAM:    GENERAL: NAD  CHEST/LUNG: Clear to percussion bilaterally  HEART: Regular rate and rhythm; S1 S2  ABDOMEN: Soft, +PEG Bowel sounds present  EXTREMITIES: no edema      LABS:                        9.0    7.18  )-----------( 348      ( 30 Jan 2020 10:02 )             28.3     01-30    133<L>  |  94<L>  |  44.0<H>  ----------------------------<  193<H>  3.8   |  29.0  |  2.20<H>    Ca    8.2<L>      30 Jan 2020 10:02            CAPILLARY BLOOD GLUCOSE      POCT Blood Glucose.: 126 mg/dL (30 Jan 2020 13:03)  POCT Blood Glucose.: 140 mg/dL (30 Jan 2020 11:21)  POCT Blood Glucose.: 227 mg/dL (30 Jan 2020 05:34)  POCT Blood Glucose.: 215 mg/dL (30 Jan 2020 00:11)  POCT Blood Glucose.: 219 mg/dL (29 Jan 2020 16:21)        RADIOLOGY & ADDITIONAL TESTS:

## 2020-01-30 NOTE — PROGRESS NOTE ADULT - ASSESSMENT
1) KEITH on CKD- now deemed ESRD  2) encephalopathy- improved  4) Acute blood loss anemia superimposed on anemia of CKD  5) Hypotension- BP now improved    HD TTS schedule - HD today  Needs output HD set up  Continue QUENTIN , PRBC transfusion prn with HD  Monitor lytes, BP and UOP    Will follow

## 2020-01-30 NOTE — PROGRESS NOTE ADULT - SUBJECTIVE AND OBJECTIVE BOX
HPI/OVERNIGHT EVENTS: No acute events overnight. AVSS. Daily dressing change with santyl completed this AM. Denies fever, chills, nausea, vomiting, chest pain, SOB, dizziness, abd pain or any other concerning symptoms    PE  Gen: patient resting comfortably in bed, in no acute distress  Respiratory: no accessory muscle use, no conversational dyspnea  Cardiovascular: regular rate & rhythm  Gastrointestinal: Abdomen soft, non-tender, non-distended  Skin: Large sacral ulcer with some areas of fibrinous exudate, No purulent drainage or surrounding cellulitis to skin edges. Depth beyond subcutaneous fat, infiltrating muscle/bone. 8x11.5x1.5 cm ulceration  Musculoskeletal: Baseline paraplegia

## 2020-01-30 NOTE — PROGRESS NOTE ADULT - ASSESSMENT
65 yo female with hx of CHFpEF, CAD, CKD, HTN, DM2, cirrhosis, portal gastropathy, recurrent GI bleed, blind, sacral decub ulcer, presented to the ED from nursing home with encephalopathy, hypoxic resp failure requiring bipap, in ED found to have anemia with Hb 5 that responded to blood transfusions (hx of recurrent GI bleed for which multiple EGDs/colonoscopies have been performed, no further invasive work up as per GI). Also noted to have sepsis on admission that was secondary to PNA, possibly gram negative bacteria. Patient completed a course of zosyn. Also with KEITH on CKD now requiring HD via left IJ. Right chest tunnelled cath placed for access on 1/3/20. Patient failed speech and swallow and is NPO. NGT feeding was started and family to make now opting for PEG. Had upper ext mapping US showed left brachial veins DVT around the catheter and superficial right basilic and left cephalic vein thrombosis which vascular recommends full anticoagulation if not contraindicated due to GI bleed on admission; discussed with GI to hold on full dose anticoagulation for now.    #Unstageable Sacral Decub -   debridement bedside 1/19/20 and 1/21/2020  debridement in OR 1/23   WOUND VAC at facility if needed  surgery following--> recommend santyl     #Oropharyngeal dysphagia   - failed MBS   - s/p PEG , tolerating TF  -  wants repeat study as patient requesting food.      repeat MBS pending     #Right pneumonia - suspected  aspiration  - treated initially abx restarted  for worsening infiltrate / CXR  - Abx completed   - has not been using bipap nor requires it     #bilateral superficial vein thrombosis on US doppler of upper ext and DVT on left brachial  vein around the catheter  - vascular rec anticoagulation due to GI bleed will hold off;  - poor candidate for full anticoagulation due to GI bleed on this admission and again in the past  - cont aspirin for now    #Acute on chronic diastolic CHF/ Acute hypoxic respiratory failure- improving   - cont HD for fluid removal   - Bipap as need.   -Supplemental oxygen via  NC comfortable     #Acute blood loss Anemia  - Hx of recurrent GI bleed? prior EGDs/ colonoscopies this year which were negative, prior CTA with +transverse colon bleed (Jan of 2019)  - Seen by GI on this admission no further work up   - repeat blood transfusion required for drop in hgb  - cont Protonix daily    #CASTAÑEDA Cirrhosis with portal gastropathy and AVMs  - change lactulose to standing through PEG with holding parameters  - c/w rifaximin  - cont PPI    #KEITH/ATN on CKD- started on new HD   - cont HD per nephrology   - nephrology follow up appreciated  - HD TTS schedule  - needs outpt HD set up  - Continue QUENTIN , PRBC transfusion prn with HD    #DM2- cont sliding scale insulin coverage   - Cont sliding scale insulin coverage     Eventual discharge to [facility]/NH; palliative care evaluation prior, however at this time family not for DNR/DNI and want all possible treatment options.  awaiting dc bed. tomorrow?

## 2020-01-30 NOTE — DISCHARGE NOTE PROVIDER - NSDCMRMEDTOKEN_GEN_ALL_CORE_FT
acetaminophen 325 mg oral tablet: 3 tab(s) orally every 6 hours, As Needed  Aquaphor topical ointment: Apply topically to B/L foot every day and evening shift for dry skin  Aspercreme with Lidocaine 4% topical cream: Apply topically to affected area once a day (1 patch to left posterior arm and 1 patch to lower back) for pain  atorvastatin 80 mg oral tablet: 1 tab(s) orally once a day (at bedtime) for HLD  budesonide 0.5 mg/2 mL inhalation suspension: 2 milliliter(s) inhaled 2 times a day for SOB  carvedilol 3.125 mg oral tablet: 1 tab(s) orally 2 times a day for hypertension  clopidogrel 75 mg oral tablet: 1 tab(s) orally once a day for blood clot prevention  Dakins Half Strength 0.25% topical solution: Apply to sacral wound topically every day and evening shift to pressure injury. Cleanse wound with dakins, pack with avani moistened with dakins, cover with foam and bg twice daily and as needed  ferrous sulfate 325 mg (65 mg elemental iron) oral tablet: 1 tab(s) orally once a day for anemia  gabapentin 300 mg oral capsule: 1 cap(s) orally 3 times a day for neuropathy  heparin 5000 units/mL injectable solution: 1 milliliter(s) subcutaneous every 8 hours for blood clot prevention  HumaLOG 100 units/mL subcutaneous solution: Inject SQ before meals for diabetes as per sliding scale if  101-150 = 2 units  150-200 = 4 units  201-250 = 6 units  251-300 = 8 units  301-350 = 10 units  351-400 = 12 units  BS&gt;401, Give 18 units and Call MD  BS&lt;60, Call MD  ipratropium-albuterol 0.5 mg-2.5 mg/3 mLinhalation solution: 3 milliliter(s) inhaled every 6 hours for SOB  isosorbide mononitrate 30 mg oral tablet, extended release: 1 tab(s) orally once a day for HTN  Lidoderm 5% topical film: Apply topically to lower back area and lower posterior arm once a day  Maalox Plus 225 mg-200 mg-25 mg/5 mL oral suspension: 30 milliliter(s) orally every 6 hours, As Needed - for heartburn  Melatonin 3 mg oral tablet: 1 tab(s) orally once a day (at bedtime)  menthol-zinc oxide 0.44%-20.625% topical ointment: Apply topically to inner buttocks topically every day and evening shift for skin excoriation  menthol-zinc oxide 0.44%-20.625% topical ointment: Apply topically to buttock wound 2 times a day  miconazole 2% topical cream: Apply topically to left breast wound  2 times a day  octreotide 20 mg intramuscular injection, extended release: 20 milligram(s) intramuscular every 28 days  pantoprazole 40 mg oral delayed release tablet: 1 tab(s) orally every 12 hours for GERD  traMADol 50 mg oral tablet: 1 tab(s) orally every 8 hours, As Needed - for moderate pain, for severe pain  Zosyn 3 g-0.375 g/50 mL intravenous solution: 3.375 gram(s) intravenous every 12 hours  started 12/21 x 7 days acetaminophen 160 mg/5 mL oral suspension: 20.31 milliliter(s) by gastrostomy tube every 6 hours, As Needed - 3), Moderate Pain (4 - 6)  aspirin 81 mg oral tablet, chewable: 1 tab(s) orally once a day  atorvastatin 80 mg oral tablet: 1 tab(s) orally once a day (at bedtime) for HLD  collagenase 250 units/g topical ointment: 1 application topically 2 times a day  ferrous sulfate 325 mg (65 mg elemental iron) oral tablet: 1 tab(s) orally once a day for anemia  heparin 5000 units/mL injectable solution: 1 milliliter(s) subcutaneous every 8 hours for blood clot prevention  HumaLOG 100 units/mL subcutaneous solution: Inject SQ before meals for diabetes as per sliding scale if  101-150 = 2 units  150-200 = 4 units  201-250 = 6 units  251-300 = 8 units  301-350 = 10 units  351-400 = 12 units  BS&gt;401, Give 18 units and Call MD  BS&lt;60, Call MD  ipratropium-albuterol 0.5 mg-2.5 mg/3 mLinhalation solution: 3 milliliter(s) inhaled every 6 hours for SOB  isosorbide mononitrate 30 mg oral tablet, extended release: 1 tab(s) orally once a day for HTN  lactulose 10 g/15 mL oral syrup: 15 milliliter(s) orally 2 times a day  Maalox Plus 225 mg-200 mg-25 mg/5 mL oral suspension: 30 milliliter(s) orally every 6 hours, As Needed - for heartburn  pantoprazole: 40 milligram(s) orally 2 times a day

## 2020-01-30 NOTE — DISCHARGE NOTE PROVIDER - NSDCCPCAREPLAN_GEN_ALL_CORE_FT
PRINCIPAL DISCHARGE DIAGNOSIS  Diagnosis: Acute GI bleeding  Assessment and Plan of Treatment: stable hemoglobin.  continue PPI.      SECONDARY DISCHARGE DIAGNOSES  Diagnosis: Hepatic encephalopathy  Assessment and Plan of Treatment: stable   continue lactulose    Diagnosis: Oropharyngeal dysphagia  Assessment and Plan of Treatment: s/p PEG  continue tube feeds    Diagnosis: Sacral decubitus ulcer  Assessment and Plan of Treatment: Patient currently with Santyl, and cover with dry kerlix of sacral wound. Wound measures 11.5x8x1.5 cm. Black granu-foam sponge to be used to the wound. Wound vac setting to 125mmHg.  Patient slated for discharge per primary team and accepting facility can place wound vac per dimensions noted above. PRINCIPAL DISCHARGE DIAGNOSIS  Diagnosis: Acute GI bleeding  Assessment and Plan of Treatment: stable hemoglobin.  continue PPI.      SECONDARY DISCHARGE DIAGNOSES  Diagnosis: Hepatic encephalopathy  Assessment and Plan of Treatment: stable   continue lactulose    Diagnosis: Oropharyngeal dysphagia  Assessment and Plan of Treatment: s/p PEG  continue tube feeds  pureed with honey thick liquids    Diagnosis: Sacral decubitus ulcer  Assessment and Plan of Treatment: Patient currently with Santyl, and cover with dry kerlix of sacral wound. Wound measures 11.5x8x1.5 cm. Black granu-foam sponge to be used to the wound. Wound vac setting to 125mmHg.  Patient slated for discharge per primary team and accepting facility can place wound vac per dimensions noted above. PRINCIPAL DISCHARGE DIAGNOSIS  Diagnosis: Acute GI bleeding  Assessment and Plan of Treatment: stable hemoglobin.  continue PPI.  Monitor CBC.      SECONDARY DISCHARGE DIAGNOSES  Diagnosis: Hepatic encephalopathy  Assessment and Plan of Treatment: stable   continue lactulose  Monitor ammonia levels    Diagnosis: Oropharyngeal dysphagia  Assessment and Plan of Treatment: s/p PEG  continue tube feeds  pureed with honey thick liquids as tolerated    Diagnosis: Sacral decubitus ulcer  Assessment and Plan of Treatment: Patient currently with Santyl, and cover with dry kerlix of sacral wound. Wound measures 11.5x8x1.5 cm. Black granu-foam sponge to be used to the wound. Wound vac setting to 125mmHg.  Patient slated for discharge per primary team and accepting facility can place wound vac per dimensions noted above.

## 2020-01-30 NOTE — CHART NOTE - NSCHARTNOTEFT_GEN_A_CORE
Source: Patient [ ]  Family [ ]   other [x ] Nursing and EMR    Current Diet: Diet, NPO with Tube Feed:   Tube Feeding Modality: Gastrostomy  Nepro  Total Volume for 24 Hours (mL): 1440  Continuous  Starting Tube Feed Rate {mL per Hour}: 30  Increase Tube Feed Rate by (mL): 10     Every 8 hours  Until Goal Tube Feed Rate (mL per Hour): 60  Tube Feed Duration (in Hours): 24  Tube Feed Start Time: 15:00 (01-23-20 @ 23:11)    Enteral /Parenteral Nutrition: Nepro at 60mL/hr (x20 hours) providing 1200mL, 2160kcal, 97g pro     Current Weight:   (1/30) 155.2#  (1/25) 160.9#  (1/23) 160.9#  (1/21) 164.6#  (1/18) 175.4#  (1/17) 152.3#  (1/14) 163.9#  (1/11) 165#  (1/9) 167#  (1/7) 148#  (1/4) 178#  (1/2) 187#   (12/29) 198.6#  (12/28) 214.5#  (12/27) 209.8#  (12/26) 214.9#  -Question accuracy of weights, will continue to monitor. Weight fluctuations could be due to fluid fluctuations, pt receiving HD. Generalized 1+ edema noted.     Pertinent Medications: MEDICATIONS  (STANDING):  aspirin  chewable 81 milliGRAM(s) Oral daily  chlorhexidine 4% Liquid 1 Application(s) Topical <User Schedule>  collagenase Ointment 1 Application(s) Topical two times a day  Dakins Solution - 1/2 Strength 1 Application(s) Topical daily  dextrose 5%. 1000 milliLiter(s) (50 mL/Hr) IV Continuous <Continuous>  dextrose 50% Injectable 12.5 Gram(s) IV Push once  dextrose 50% Injectable 25 Gram(s) IV Push once  dextrose 50% Injectable 25 Gram(s) IV Push once  epoetin sara Injectable 84678 Unit(s) IV Push <User Schedule>  heparin  Injectable 5000 Unit(s) SubCutaneous every 8 hours  insulin lispro (HumaLOG) corrective regimen sliding scale   SubCutaneous every 6 hours  lactulose Syrup 10 Gram(s) Oral two times a day  pantoprazole  Injectable 40 milliGRAM(s) IV Push two times a day    MEDICATIONS  (PRN):  acetaminophen    Suspension .. 650 milliGRAM(s) Enteral Tube every 6 hours PRN Temp greater or equal to 38C (100.4F), Mild Pain (1 - 3), Moderate Pain (4 - 6)  albuterol/ipratropium for Nebulization 3 milliLiter(s) Nebulizer every 6 hours PRN Shortness of Breath and/or Wheezing  dextrose 40% Gel 15 Gram(s) Oral once PRN Blood Glucose LESS THAN 70 milliGRAM(s)/deciLiter  glucagon  Injectable 1 milliGRAM(s) IntraMuscular once PRN Glucose <70 milliGRAM(s)/deciLiter  morphine  - Injectable 2 milliGRAM(s) IV Push every 4 hours PRN Severe Pain (7 - 10)  sodium chloride 0.9% lock flush 10 milliLiter(s) IV Push every 1 hour PRN Pre/post blood products, medications, blood draw, and to maintain line patency    Pertinent Labs: CBC Full  -  ( 30 Jan 2020 10:02 )  WBC Count : 7.18 K/uL  RBC Count : 3.32 M/uL  Hemoglobin : 9.0 g/dL  Hematocrit : 28.3 %  Platelet Count - Automated : 348 K/uL  Mean Cell Volume : 85.2 fl  Mean Cell Hemoglobin : 27.1 pg  Mean Cell Hemoglobin Concentration : 31.8 gm/dL  Auto Neutrophil # : x  Auto Lymphocyte # : x  Auto Monocyte # : x  Auto Eosinophil # : x  Auto Basophil # : x  Auto Neutrophil % : x  Auto Lymphocyte % : x  Auto Monocyte % : x  Auto Eosinophil % : x  Auto Basophil % : x  01-30 Na133 mmol/L<L> Glu 193 mg/dL<H> K+ 3.8 mmol/L Cr  2.20 mg/dL<H> BUN 44.0 mg/dL<H> Phos n/a   Alb n/a   PAB n/a       Skin: IAD, R heel DM ulcer, Coccyx unstageable     Nutrition focused physical exam previously conducted - found signs of malnutrition [ x]absent [ ]present    Subcutaneous fat loss: [ ] Orbital fat pads region, [ ]Buccal fat region, [ ]Triceps region,  [ ]Ribs region    Muscle wasting: [ ]Temples region, [ ]Clavicle region, [ ]Shoulder region, [ ]Scapula region, [ ]Interosseous region,  [ ]thigh region, [ ]Calf region    Estimated Needs:   [x ] no change since previous assessment  [ ] recalculated:     Current Nutrition Diagnosis: Pt remains at high nutrition risk and meets criteria for moderate (chronic) malnutrition related to inability to consume increased protein energy intake in setting of unstageable sacral wound, hypovolemic shock for possible GI bleed/blood loss anemia as evidenced by pt meeting <75% estimated energy intake > 1 month and moderate edema. TF currently held, pt at HD. Nursing stated pt reported stomach pain. Last BM 1/29 documented. RD to remain available.     Recommendations:   1) If stomach pain continues consider decreasing TF rate to 50ml/hr, monitor tolerance  2) Monitor weights and labs    Monitoring and Evaluation:   [ ] PO intake [ x] Tolerance to diet prescription [X] Weights  [X] Follow up per protocol [X] Labs:

## 2020-01-30 NOTE — DISCHARGE NOTE PROVIDER - HOSPITAL COURSE
63 yo female with hx of CHFpEF, CAD, CKD, HTN, DM2, cirrhosis, portal gastropathy, recurrent GI bleed, blind, sacral decub ulcer, presented to the ED from nursing home with encephalopathy, hypoxic resp failure requiring BIPAP, in ED found to have anemia with Hb 5 that responded to blood transfusions (hx of recurrent GI bleed for which multiple EGDs/colonoscopies have been performed, no further invasive work up as per GI). Also noted to have sepsis on admission that was secondary to PNA, possibly gram negative bacteria. Patient completed a course of zosyn. Also with KEITH on CKD now requiring HD via left IJ. Right chest tunnelled cath placed for access on 1/3/20. Patient failed speech and swallow and Pt placed NPO. NGT feeding was started and family to make now opting for PEG, which was placed 1/10/20. Had upper ext vessel mapping US which showed left brachial veins DVT around the catheter and superficial right basilic and left cephalic vein thrombosis. Seen by Vascular surgery and recommends full anticoagulation if not contraindicated due to GI bleed on admission; discussed with GI to hold on full dose anticoagulation for now. Also found to have an Unstageable Sacral Decubitus ulcer. Debridement done at beside by Surgical team bedside 1/19/20 and 1/21/2020 and debridement in OR 1/23     Wound vacuum was placed; however, will only need Santyl, and wet to dry dressings of sacral wound. As per surgical team, the wound measures 11.5x8x1.5 cm.        Pt is stable. Respiratory failure resolved. Continue HD for fluid removal at [facility]. Continue to monitor CBC, BMP and ammonia levels at [facility]. To assess speech and swallow assessments as needed and to have a MBS as an outpatient . Dischare planning 35 minutes. 65 yo female with hx of CHFpEF, CAD, CKD, HTN, DM2, cirrhosis, portal gastropathy, recurrent GI bleed, blind, sacral decub ulcer, presented to the ED from nursing home with encephalopathy, hypoxic resp failure requiring BIPAP, in ED found to have anemia with Hb 5 that responded to blood transfusions (hx of recurrent GI bleed for which multiple EGDs/colonoscopies have been performed, no further invasive work up as per GI). Also noted to have sepsis on admission that was secondary to PNA, possibly gram negative bacteria. Patient completed a course of zosyn. Also with KEITH on CKD now requiring HD via left IJ. Right chest tunnelled cath placed for access on 1/3/20. Patient failed speech and swallow and Pt placed NPO. NGT feeding was started and family to make now opting for PEG, which was placed 1/10/20. Had upper ext vessel mapping US which showed left brachial veins DVT around the catheter and superficial right basilic and left cephalic vein thrombosis. Seen by Vascular surgery and recommends full anticoagulation if not contraindicated due to GI bleed on admission; discussed with GI to hold on full dose anticoagulation for now. Also found to have an Unstageable Sacral Decubitus ulcer. Debridement done at beside by Surgical team bedside 1/19/20 and 1/21/2020 and debridement in OR 1/23     Wound vacuum was placed; however, will only need Santyl, and wet to dry dressings of sacral wound. As per surgical team, the wound measures 11.5x8x1.5 cm. Wound vac to be considered at facility         Pt is stable. Respiratory failure resolved. Continue HD for fluid removal at [facility]. Continue to monitor CBC, BMP and ammonia levels at [facility]. To assess speech and swallow assessments as needed and to have a MBS as an outpatient . Dischare planning 35 minutes. 65 yo female with hx of CHFpEF, CAD, CKD, HTN, DM2, cirrhosis, portal gastropathy, recurrent GI bleed, blind, sacral decub ulcer, presented to the ED from nursing home with encephalopathy, hypoxic resp failure requiring BIPAP, in ED found to have anemia with Hb 5 that responded to blood transfusions (hx of recurrent GI bleed for which multiple EGDs/colonoscopies have been performed, no further invasive work up as per GI). Also noted to have sepsis on admission that was secondary to PNA, possibly gram negative bacteria. Patient completed a course of zosyn. Also with KEITH on CKD now requiring HD via left IJ. Right chest tunnelled cath placed for access on 1/3/20. Patient failed speech and swallow and Pt placed NPO. NGT feeding was started and family to make now opting for PEG, which was placed 1/10/20. Had upper ext vessel mapping US which showed left brachial veins DVT around the catheter and superficial right basilic and left cephalic vein thrombosis. Seen by Vascular surgery and recommends full anticoagulation if not contraindicated due to GI bleed on admission; discussed with GI to hold on full dose anticoagulation for now. Also found to have an Unstageable Sacral Decubitus ulcer. Debridement done at beside by Surgical team bedside 1/19/20 and 1/21/2020 and debridement in OR 1/23     Wound vacuum was placed; however, will only need Santyl, and wet to dry dressings of sacral wound. As per surgical team, the wound measures 11.5x8x1.5 cm. Wound vac to be considered at facility     modified barium swallow with patient tolerating pureed with honey thick liquids.  would continue tube feeds until oral intake adequate for nutritional requirements.                Discharge planning 35 minutes. 63 yo female with hx of CHFpEF, CAD, CKD, HTN, DM2, cirrhosis, portal gastropathy, recurrent GI bleed, blind, sacral decub ulcer, presented to the ED from nursing home with encephalopathy, hypoxic resp failure requiring BIPAP, in ED found to have anemia with Hb 5 that responded to blood transfusions (hx of recurrent GI bleed for which multiple EGDs/colonoscopies have been performed, no further invasive work up as per GI). Also noted to have sepsis on admission that was secondary to PNA, possibly gram negative bacteria. Patient completed a course of zosyn. Also with KEITH on CKD now requiring HD via left IJ. Right chest tunnelled cath placed for access on 1/3/20. Patient failed speech and swallow and Pt placed NPO. NGT feeding was started and family to make now opting for PEG, which was placed 1/10/20. Had upper ext vessel mapping US which showed left brachial veins DVT around the catheter and superficial right basilic and left cephalic vein thrombosis. Seen by Vascular surgery and recommends full anticoagulation if not contraindicated due to GI bleed on admission; discussed with GI to hold on full dose anticoagulation for now. Also found to have an Unstageable Sacral Decubitus ulcer. Debridement done at beside by Surgical team bedside 1/19/20 and 1/21/2020 and debridement in OR 1/23     Wound vacuum was placed; however, will only need Santyl, and wet to dry dressings of sacral wound. As per surgical team, the wound measures 11.5x8x1.5 cm. Wound vac to be considered at facility     modified barium swallow with patient tolerating pureed with honey thick liquids.  would continue tube feeds until oral intake adequate for nutritional requirements.                Discharge planning 38 minutes.

## 2020-01-30 NOTE — PROGRESS NOTE ADULT - SUBJECTIVE AND OBJECTIVE BOX
[facility] DIVISION OF KIDNEY DISEASES AND HYPERTENSION -- HEMODIALYSIS NOTE  --------------------------------------------------------------------------------  Chief Complaint: ESRD/Ongoing hemodialysis requirement    24 hour events/subjective:  Pt seen during HD, tolerating well      PAST HISTORY  --------------------------------------------------------------------------------  No significant changes to PMH, PSH, FHx, SHx, unless otherwise noted    ALLERGIES & MEDICATIONS  --------------------------------------------------------------------------------  Allergies    ACE inhibitors (Other (Mild))    Intolerances    oxycodone (Sedation/Somnol)    Standing Inpatient Medications  aspirin  chewable 81 milliGRAM(s) Oral daily  chlorhexidine 4% Liquid 1 Application(s) Topical <User Schedule>  collagenase Ointment 1 Application(s) Topical two times a day  Dakins Solution - 1/2 Strength 1 Application(s) Topical daily  dextrose 5%. 1000 milliLiter(s) IV Continuous <Continuous>  dextrose 50% Injectable 12.5 Gram(s) IV Push once  dextrose 50% Injectable 25 Gram(s) IV Push once  dextrose 50% Injectable 25 Gram(s) IV Push once  epoetin sara Injectable 57457 Unit(s) IV Push <User Schedule>  heparin  Injectable 5000 Unit(s) SubCutaneous every 8 hours  insulin lispro (HumaLOG) corrective regimen sliding scale   SubCutaneous every 6 hours  lactulose Syrup 10 Gram(s) Oral two times a day  pantoprazole  Injectable 40 milliGRAM(s) IV Push two times a day    PRN Inpatient Medications  acetaminophen    Suspension .. 650 milliGRAM(s) Enteral Tube every 6 hours PRN  albuterol/ipratropium for Nebulization 3 milliLiter(s) Nebulizer every 6 hours PRN  dextrose 40% Gel 15 Gram(s) Oral once PRN  glucagon  Injectable 1 milliGRAM(s) IntraMuscular once PRN  morphine  - Injectable 2 milliGRAM(s) IV Push every 4 hours PRN  sodium chloride 0.9% lock flush 10 milliLiter(s) IV Push every 1 hour PRN      REVIEW OF SYSTEMS  --------------------------------------------------------------------------------  Gen: No weight changes, fatigue, fevers/chills, weakness  Skin: No rashes  Head/Eyes/Ears/Mouth: No headache  Respiratory: No dyspnea, cough,  CV: No chest pain, orthopnea  GI: No abdominal pain, diarrhea, constipation, nausea, vomiting,  MSK: No joint pain  Neuro: No dizziness/lightheadedness, weakness  Heme: No bleeding  Psych: No significant nervousness, anxiety, stress, depression    All other systems were reviewed and are negative, except as noted.    VITALS/PHYSICAL EXAM  --------------------------------------------------------------------------------  T(C): 36.9 (01-30-20 @ 09:38), Max: 37 (01-30-20 @ 07:35)  HR: 86 (01-30-20 @ 09:38) (86 - 89)  BP: 109/57 (01-30-20 @ 09:38) (102/64 - 124/72)  RR: 18 (01-30-20 @ 09:38) (18 - 18)  SpO2: 100% (01-30-20 @ 09:38) (94% - 100%)  Wt(kg): --    Weight (kg): 82.9 (01-29-20 @ 09:40)      Physical Exam:  	Gen: NAD  	HEENT:  supple neck,  	Pulm: CTA B/L  	CV: RRR, S1S2; no rub  	Abd: +BS, soft, nontender  	UE: Warm,   	LE: Warm, no edema  	Neuro: No focal deficits  	Psych: Normal affect and mood  	Skin: Warm, without rashes  	Vascular access: RIJ tunneled HD catheter      LABS/STUDIES  --------------------------------------------------------------------------------              9.0    7.18  >-----------<  348      [01-30-20 @ 10:02]              28.3     133  |  94  |  44.0  ----------------------------<  193      [01-30-20 @ 10:02]  3.8   |  29.0  |  2.20        Ca     8.2     [01-30-20 @ 10:02]    Iron 25, TIBC 154, %sat 16      [01-02-20 @ 09:39]  Ferritin 668      [01-02-20 @ 09:39]  PTH -- (Ca 8.0)      [01-02-20 @ 17:06]   119  PTH -- (Ca 9.1)      [05-30-19 @ 18:09]   107  Vitamin D (25OH) 25.2      [01-02-20 @ 17:06]  HbA1c 5.8      [12-26-19 @ 09:04]  TSH 1.10      [05-17-19 @ 12:21]

## 2020-01-30 NOTE — DISCHARGE NOTE PROVIDER - PROVIDER TOKENS
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  Continue treatment as outlined by Hematology. Neurology consult Neurology consult Neurology consult - no immediate intervention  f/u as outpatiient after completes chemotherapy. PROVIDER:[TOKEN:[[id number]]],PROVIDER:[TOKEN:[[id number]]],PROVIDER:[TOKEN:[[id number]]],PROVIDER:[TOKEN:[[id number]]] PROVIDER:[TOKEN:[[id number]]],PROVIDER:[TOKEN:[[id number]]],PROVIDER:[TOKEN:[[id number]]]

## 2020-01-30 NOTE — DISCHARGE NOTE PROVIDER - CARE PROVIDER_API CALL
Desiree Castro)  Internal Medicine; Nephrology  [address] [place] [address]  Phone: [phone]  Fax: [phone]  Follow Up Time:     Chad Delaney)  Surgery; Surgical Critical Care  [address] [place] [address]  Phone: [phone]  Fax: [phone]  Follow Up Time:     Em Drew (DO)  Cardiology; Internal Medicine  [address] [facility], [address]  [place] [address]  Phone: [phone]  Fax: [phone]  Follow Up Time:     George Carvalho)  Gastroenterology; Internal Medicine  [address] [facility], [address]  [place] [address]  Phone: [phone]  Fax: [phone]  Follow Up Time: Desiree Castro)  Internal Medicine; Nephrology  [address] [place] [address]  Phone: [phone]  Fax: [phone]  Follow Up Time:     Em Drew ()  Cardiology; Internal Medicine  [address] [facility], [address]  [place] [address]  Phone: [phone]  Fax: [phone]  Follow Up Time:     George Carvalho)  Gastroenterology; Internal Medicine  [address] [facility], [address]  [place] [address]  Phone: [phone]  Fax: [phone]  Follow Up Time:

## 2020-01-30 NOTE — DISCHARGE NOTE PROVIDER - PROVIDER RX CONTACT NUMBER
02/07/2019  Estefany Pratt is a 67 y.o., female.    Anesthesia Evaluation    I have reviewed the Patient Summary Reports.    I have reviewed the Nursing Notes.   I have reviewed the Medications.     Review of Systems  Anesthesia Hx:  No problems with previous Anesthesia    Social:  Former Smoker    Cardiovascular:   Hypertension ECG has been reviewed. CONCLUSIONS     1 - Concentric remodeling.     2 - No wall motion abnormalities.     3 - Normal left ventricular systolic function (EF 60-65%).     4 - Normal left ventricular diastolic function.     5 - Normal right ventricular systolic function .  Hypertension, Essential Hypertension    Hepatic/GI:   GERD    Psych:   Psychiatric History anxiety depression          Physical Exam  General:  Well nourished    Airway/Jaw/Neck:  Airway Findings: Mouth Opening: Normal Tongue: Normal  General Airway Assessment: Adult       Chest/Lungs:  Chest/Lungs Findings: Normal Respiratory Rate     Heart/Vascular:  Heart Findings: Rate: Normal             Anesthesia Plan  Type of Anesthesia, risks & benefits discussed:  Anesthesia Type:  MAC  Patient's Preference:   Intra-op Monitoring Plan: standard ASA monitors  Intra-op Monitoring Plan Comments:   Post Op Pain Control Plan:   Post Op Pain Control Plan Comments:   Induction:   IV  Beta Blocker:  Patient is not currently on a Beta-Blocker (No further documentation required).       Informed Consent: Patient understands risks and agrees with Anesthesia plan.  Questions answered. Anesthesia consent signed with patient.  ASA Score: 2     Day of Surgery Review of History & Physical: I have interviewed and examined the patient. I have reviewed the patient's H&P dated:  There are no significant changes.          Ready For Surgery From Anesthesia Perspective.        [phone]

## 2020-01-30 NOTE — PROGRESS NOTE ADULT - ASSESSMENT
65 yo F wit paraplegia presented from nursing home for Stage III-IV sacral decubitus ulcer being followed by surgery for wound care.    - QD dressing change with santyl and dry kerlix  - Re-eval for possible wound vac placement on 1/30  - Offloading bed and boots recommended Patient should be on a Clinitron bed, with offloading   - Pain control  - Rest of care per primary team 63 yo F wit paraplegia presented from nursing home for Stage III-IV sacral decubitus ulcer being followed by surgery for wound care.    - QD dressing change with santyl and dry kerlix  - plan for wound vac today  - Offloading bed and boots recommended Patient should be on a Clinitron bed, with offloading   - Pain control  - ok for discharge per primary team 65 yo F wit paraplegia presented from nursing home for Stage III-IV sacral decubitus ulcer being followed by surgery for wound care.    - BID dressing change with santyl and dry kerlix  - no plan for wound vac today  - Offloading bed and boots recommended Patient should be on a Clinitron bed, with offloading   - Pain control  - ok for discharge per primary team 65 yo F wit paraplegia presented from nursing home for Stage III-IV sacral decubitus ulcer being followed by surgery for wound care.    - BID dressing change with santyl and dry kerlix  - no plan for wound vac today, SINGH can re-evaluate with previously listed dimensions  - Offloading bed and boots recommended Patient should be on a Clinitron bed, with offloading   - Pain control  - ok for discharge per primary team

## 2020-01-31 LAB
GLUCOSE BLDC GLUCOMTR-MCNC: 184 MG/DL — HIGH (ref 70–99)
GLUCOSE BLDC GLUCOMTR-MCNC: 199 MG/DL — HIGH (ref 70–99)
GLUCOSE BLDC GLUCOMTR-MCNC: 219 MG/DL — HIGH (ref 70–99)
GLUCOSE BLDC GLUCOMTR-MCNC: 274 MG/DL — HIGH (ref 70–99)

## 2020-01-31 PROCEDURE — 99232 SBSQ HOSP IP/OBS MODERATE 35: CPT

## 2020-01-31 PROCEDURE — 74230 X-RAY XM SWLNG FUNCJ C+: CPT | Mod: 26

## 2020-01-31 PROCEDURE — 99024 POSTOP FOLLOW-UP VISIT: CPT

## 2020-01-31 PROCEDURE — 99233 SBSQ HOSP IP/OBS HIGH 50: CPT

## 2020-01-31 RX ORDER — MORPHINE SULFATE 50 MG/1
2 CAPSULE, EXTENDED RELEASE ORAL ONCE
Refills: 0 | Status: DISCONTINUED | OUTPATIENT
Start: 2020-01-31 | End: 2020-01-31

## 2020-01-31 RX ORDER — MORPHINE SULFATE 50 MG/1
2 CAPSULE, EXTENDED RELEASE ORAL EVERY 4 HOURS
Refills: 0 | Status: DISCONTINUED | OUTPATIENT
Start: 2020-01-31 | End: 2020-02-03

## 2020-01-31 RX ADMIN — Medication 6: at 13:07

## 2020-01-31 RX ADMIN — MORPHINE SULFATE 2 MILLIGRAM(S): 50 CAPSULE, EXTENDED RELEASE ORAL at 00:35

## 2020-01-31 RX ADMIN — Medication 650 MILLIGRAM(S): at 03:33

## 2020-01-31 RX ADMIN — Medication 2: at 16:35

## 2020-01-31 RX ADMIN — LACTULOSE 10 GRAM(S): 10 SOLUTION ORAL at 16:36

## 2020-01-31 RX ADMIN — HEPARIN SODIUM 5000 UNIT(S): 5000 INJECTION INTRAVENOUS; SUBCUTANEOUS at 13:07

## 2020-01-31 RX ADMIN — HEPARIN SODIUM 5000 UNIT(S): 5000 INJECTION INTRAVENOUS; SUBCUTANEOUS at 21:05

## 2020-01-31 RX ADMIN — Medication 2: at 05:07

## 2020-01-31 RX ADMIN — Medication 1 APPLICATION(S): at 13:08

## 2020-01-31 RX ADMIN — MORPHINE SULFATE 2 MILLIGRAM(S): 50 CAPSULE, EXTENDED RELEASE ORAL at 00:20

## 2020-01-31 RX ADMIN — PANTOPRAZOLE SODIUM 40 MILLIGRAM(S): 20 TABLET, DELAYED RELEASE ORAL at 05:02

## 2020-01-31 RX ADMIN — LACTULOSE 10 GRAM(S): 10 SOLUTION ORAL at 05:01

## 2020-01-31 RX ADMIN — PANTOPRAZOLE SODIUM 40 MILLIGRAM(S): 20 TABLET, DELAYED RELEASE ORAL at 16:36

## 2020-01-31 RX ADMIN — Medication 1 APPLICATION(S): at 05:01

## 2020-01-31 RX ADMIN — Medication 1 APPLICATION(S): at 16:36

## 2020-01-31 RX ADMIN — Medication 4: at 23:21

## 2020-01-31 RX ADMIN — Medication 650 MILLIGRAM(S): at 04:01

## 2020-01-31 RX ADMIN — Medication 81 MILLIGRAM(S): at 13:07

## 2020-01-31 RX ADMIN — HEPARIN SODIUM 5000 UNIT(S): 5000 INJECTION INTRAVENOUS; SUBCUTANEOUS at 05:02

## 2020-01-31 NOTE — SWALLOW VFSS/MBS ASSESSMENT ADULT - DIAGNOSTIC IMPRESSIONS
Moderate oral dysphagia across administered consistencies. Oral stage marked by reduced lingual strength and coordination with premature loss of solids to the oropharynx and liquids to the hypopharynx. Reduced mastication with mechanical soft.     Severe pharyngeal dysphagia across administered consistencies marked by reduced tongue base retraction, reduced hyolaryngeal excursion, reduced laryngeal elevation, reduced upper/lower airway closure. Severe vallecular stasis with puree and mechanical soft solids, unable to lessen or clear with subsequent swallows. Honey and nectar liquid trials lessened stasis in vallecula to moderate, however +penetration of stasis to the cords without clearance after the swallow. +silent aspiration of small amount of thin liquids. Compensatory postures attempted, however pt unable to effectively complete 2* reduced cognition.
Moderate oral dysphagia, with all PO further impacted by reduced cognition, with reduced lingual ROM & coordination. Mild pharyngeal dysphagia for puree, mech soft, soft solids & honey thick fluids, marked by reduced tongue base retraction, resulting in valleculae stasis, which was reduced with cued successive swallow. Mild to moderate pharyngeal dysphagia for nectar thick & thin fluids, with reduced laryngeal elevation & airway closure, resulting in 1 episode of trace penetration with nectar thick fluids (out of 5 trials & impacted by bolus size) & silent, trace penetration during swallow with thin fluids.

## 2020-01-31 NOTE — PROGRESS NOTE ADULT - SUBJECTIVE AND OBJECTIVE BOX
[facility] DIVISION OF KIDNEY DISEASES AND HYPERTENSION -- HEMODIALYSIS NOTE  --------------------------------------------------------------------------------  Chief Complaint: ESRD/Ongoing hemodialysis requirement    24 hour events/subjective:  s/p HD yesterday with 1.5 L UF      PAST HISTORY  --------------------------------------------------------------------------------  No significant changes to PMH, PSH, FHx, SHx, unless otherwise noted    ALLERGIES & MEDICATIONS  --------------------------------------------------------------------------------  Allergies    ACE inhibitors (Other (Mild))    Intolerances    oxycodone (Sedation/Somnol)    Standing Inpatient Medications  aspirin  chewable 81 milliGRAM(s) Oral daily  chlorhexidine 4% Liquid 1 Application(s) Topical <User Schedule>  collagenase Ointment 1 Application(s) Topical two times a day  Dakins Solution - 1/2 Strength 1 Application(s) Topical daily  dextrose 5%. 1000 milliLiter(s) IV Continuous <Continuous>  dextrose 50% Injectable 12.5 Gram(s) IV Push once  dextrose 50% Injectable 25 Gram(s) IV Push once  dextrose 50% Injectable 25 Gram(s) IV Push once  epoetin sara Injectable 96904 Unit(s) IV Push <User Schedule>  heparin  Injectable 5000 Unit(s) SubCutaneous every 8 hours  insulin lispro (HumaLOG) corrective regimen sliding scale   SubCutaneous every 6 hours  lactulose Syrup 10 Gram(s) Oral two times a day  pantoprazole  Injectable 40 milliGRAM(s) IV Push two times a day    PRN Inpatient Medications  acetaminophen    Suspension .. 650 milliGRAM(s) Enteral Tube every 6 hours PRN  albuterol/ipratropium for Nebulization 3 milliLiter(s) Nebulizer every 6 hours PRN  dextrose 40% Gel 15 Gram(s) Oral once PRN  glucagon  Injectable 1 milliGRAM(s) IntraMuscular once PRN  sodium chloride 0.9% lock flush 10 milliLiter(s) IV Push every 1 hour PRN      REVIEW OF SYSTEMS  --------------------------------------------------------------------------------    Gen: No weight changes, fatigue, fevers/chills, weakness  Skin: No rashes  Head/Eyes/Ears/Mouth: No headache  Respiratory: No dyspnea, cough,  CV: No chest pain, orthopnea  GI: No abdominal pain, diarrhea, constipation, nausea, vomiting,  MSK: No joint pain  Neuro: No dizziness/lightheadedness, weakness  Heme: No bleeding  Psych: No significant nervousness, anxiety, stress, depression    All other systems were reviewed and are negative, except as noted.    VITALS/PHYSICAL EXAM  --------------------------------------------------------------------------------  T(C): 36.3 (01-31-20 @ 07:49), Max: 36.9 (01-30-20 @ 13:36)  HR: 85 (01-31-20 @ 07:49) (85 - 869)  BP: 95/59 (01-31-20 @ 07:49) (95/59 - 154/42)  RR: 18 (01-31-20 @ 07:49) (18 - 18)  SpO2: 98% (01-31-20 @ 07:49) (98% - 100%)  Wt(kg): --    01-30-20 @ 07:01  -  01-31-20 @ 07:00  --------------------------------------------------------  IN: 360 mL / OUT: 1500 mL / NET: -1140 mL      Physical Exam:  	Gen: NAD  	HEENT; supple neck,  	Pulm: CTA B/L  	CV: RRR, S1S2; no rub  	Abd: +BS, soft, nontender/nondistended  	: No suprapubic tenderness  	UE: Warm, no edema; no asterixis  	LE: Warm, no edema  	Neuro: No focal deficits  	Psych: Normal affect and mood  	Skin: Warm, without rashes  	Vascular access: RIJ tunneled HD catheter+    LABS/STUDIES  --------------------------------------------------------------------------------              9.0    7.18  >-----------<  348      [01-30-20 @ 10:02]              28.3     133  |  94  |  44.0  ----------------------------<  193      [01-30-20 @ 10:02]  3.8   |  29.0  |  2.20        Ca     8.2     [01-30-20 @ 10:02]      Iron 25, TIBC 154, %sat 16      [01-02-20 @ 09:39]  Ferritin 668      [01-02-20 @ 09:39]  PTH -- (Ca 8.0)      [01-02-20 @ 17:06]   119  PTH -- (Ca 9.1)      [05-30-19 @ 18:09]   107  Vitamin D (25OH) 25.2      [01-02-20 @ 17:06]  HbA1c 5.8      [12-26-19 @ 09:04]  TSH 1.10      [05-17-19 @ 12:21]

## 2020-01-31 NOTE — PROGRESS NOTE ADULT - SUBJECTIVE AND OBJECTIVE BOX
INTERVAL HPI/OVERNIGHT EVENTS:    Patient seen this AM with no  acute events overnight. Patient without any acute complaints at this time. Patient pain is well controlled on current pain regiment. Tolerating diet without any n/v, has been having normal bowel function. Remain hemodynamically stable and denies fevers, chills. No CP or SOB Primary team planning for discharge at this time    MEDICATIONS  (STANDING):  aspirin  chewable 81 milliGRAM(s) Oral daily  chlorhexidine 4% Liquid 1 Application(s) Topical <User Schedule>  collagenase Ointment 1 Application(s) Topical two times a day  Dakins Solution - 1/2 Strength 1 Application(s) Topical daily  dextrose 5%. 1000 milliLiter(s) (50 mL/Hr) IV Continuous <Continuous>  dextrose 50% Injectable 12.5 Gram(s) IV Push once  dextrose 50% Injectable 25 Gram(s) IV Push once  dextrose 50% Injectable 25 Gram(s) IV Push once  epoetin sara Injectable 68636 Unit(s) IV Push <User Schedule>  heparin  Injectable 5000 Unit(s) SubCutaneous every 8 hours  insulin lispro (HumaLOG) corrective regimen sliding scale   SubCutaneous every 6 hours  lactulose Syrup 10 Gram(s) Oral two times a day  pantoprazole  Injectable 40 milliGRAM(s) IV Push two times a day    MEDICATIONS  (PRN):  acetaminophen    Suspension .. 650 milliGRAM(s) Enteral Tube every 6 hours PRN Temp greater or equal to 38C (100.4F), Mild Pain (1 - 3), Moderate Pain (4 - 6)  albuterol/ipratropium for Nebulization 3 milliLiter(s) Nebulizer every 6 hours PRN Shortness of Breath and/or Wheezing  dextrose 40% Gel 15 Gram(s) Oral once PRN Blood Glucose LESS THAN 70 milliGRAM(s)/deciLiter  glucagon  Injectable 1 milliGRAM(s) IntraMuscular once PRN Glucose <70 milliGRAM(s)/deciLiter  sodium chloride 0.9% lock flush 10 milliLiter(s) IV Push every 1 hour PRN Pre/post blood products, medications, blood draw, and to maintain line patency      Vital Signs Last 24 Hrs  T(C): 36.6 (30 Jan 2020 23:56), Max: 37 (30 Jan 2020 07:35)  T(F): 97.8 (30 Jan 2020 23:56), Max: 98.6 (30 Jan 2020 07:35)  HR: 90 (30 Jan 2020 23:56) (86 - 869)  BP: 115/63 (30 Jan 2020 23:56) (102/64 - 154/42)  BP(mean): --  RR: 18 (30 Jan 2020 23:56) (18 - 18)  SpO2: 98% (30 Jan 2020 16:05) (98% - 100%)    Physical Exam:  Gen: patient resting comfortably in bed, in no acute distress  Respiratory: no accessory muscle use, no conversational dyspnea  Cardiovascular: regular rate & rhythm  Gastrointestinal: Abdomen soft, non-tender, non-distended  Skin: Large sacral ulcer with some areas of fibrinous exudate in the areas surrounding central region of wound, No purulent drainage or surrounding cellulitis to skin edges. Depth beyond subcutaneous fat, infiltrating muscle/bone. 8x11.5x1.5 cm ulceration  Musculoskeletal: Baseline paraplegia      I&O's Detail    30 Jan 2020 07:01  -  31 Jan 2020 00:10  --------------------------------------------------------  IN:    Nepro: 360 mL  Total IN: 360 mL    OUT:    Other: 1500 mL  Total OUT: 1500 mL    Total NET: -1140 mL          LABS:                        9.0    7.18  )-----------( 348      ( 30 Jan 2020 10:02 )             28.3     01-30    133<L>  |  94<L>  |  44.0<H>  ----------------------------<  193<H>  3.8   |  29.0  |  2.20<H>    Ca    8.2<L>      30 Jan 2020 10:02            RADIOLOGY & ADDITIONAL STUDIES:

## 2020-01-31 NOTE — PROGRESS NOTE ADULT - SUBJECTIVE AND OBJECTIVE BOX
CC: 63y/o female with CC encephalopathy, esrd, sacral decub    INTERVAL HPI/OVERNIGHT EVENTS:  Patient seen and examined at bedside this AM. Continuing to tolerate HD. No events overnight, no new complaints, pain well controlled. Denies chest pain, SOB, abdominal pain, joint pain, n/v/d/c, fevers, chills.     REVIEW OF SYSTEMS:  All reamining negative unless appreciated above.     Vital Signs Last 24 Hrs  T(C): 36.3 (31 Jan 2020 07:49), Max: 36.9 (30 Jan 2020 13:36)  T(F): 97.3 (31 Jan 2020 07:49), Max: 98.4 (30 Jan 2020 13:36)  HR: 85 (31 Jan 2020 07:49) (85 - 869)  BP: 95/59 (31 Jan 2020 07:49) (95/59 - 154/42)  BP(mean): --  RR: 18 (31 Jan 2020 07:49) (18 - 18)  SpO2: 98% (31 Jan 2020 07:49) (98% - 100%)      PHYSICAL EXAM:  GENERAL: Pt lying in bed comfortably in NAD  HEAD:  Atraumatic, Normocephalic  EYES: EOMI, +Cataract  ENT: Moist mucous membranes  NECK: Supple, No JVD  CHEST/LUNG: Clear to auscultation bilaterally; No rales, rhonchi, wheezing, or rubs. Unlabored respirations  HEART: Regular rate and rhythm; No murmurs, rubs, or gallops  ABDOMEN: +PEG, Bowel sounds present; Soft, Nontender, Nondistended. No guarding or rigidity   EXTREMITIES:  2+ Peripheral Pulses, brisk capillary refill. No clubbing, cyanosis, or edema      LABS:                        9.0    7.18  )-----------( 348      ( 30 Jan 2020 10:02 )             28.3     01-30    133<L>  |  94<L>  |  44.0<H>  ----------------------------<  193<H>  3.8   |  29.0  |  2.20<H>    Ca    8.2<L>      30 Jan 2020 10:02

## 2020-01-31 NOTE — PROGRESS NOTE ADULT - ASSESSMENT
65 yo F wit paraplegia presented from nursing home for Stage III-IV sacral decubitus ulcer being followed by surgery for wound care.    - Continue santyl debridement of the wound, BID dressing changes  - Bedside debridement planned for today to assist in removal of fibrinous tissue  - no plan for wound vac today, SINGH can re-evaluate with previously listed dimensions  - Offloading bed and boots recommended Patient should be on a Clinitron bed, with offloading   - Pain control  - ok for discharge per primary team

## 2020-01-31 NOTE — PROGRESS NOTE ADULT - ASSESSMENT
ASSESSMENT/ PLAN:  65 yo female with hx of CHFpEF, CAD, CKD, HTN, DM2, cirrhosis, portal gastropathy, recurrent GI bleed, blind, sacral decub ulcer, presented to the ED from nursing home with encephalopathy, hypoxic resp failure requiring bipap, in ED found to have anemia with Hb 5 that responded to blood transfusions (hx of recurrent GI bleed for which multiple EGDs/colonoscopies have been performed, no further invasive work up as per GI). Also noted to have sepsis on admission that was secondary to PNA, possibly gram negative bacteria. Patient completed a course of zosyn. Also with KEITH on CKD now requiring HD via left IJ. Right chest tunnelled cath placed for access on 1/3/20. Patient failed speech and swallow and is NPO. NGT feeding was started and family to make now opting for PEG. Had upper ext mapping US showed left brachial veins DVT around the catheter and superficial right basilic and left cephalic vein thrombosis which vascular recommends full anticoagulation if not contraindicated due to GI bleed on admission; discussed with GI to hold on full dose anticoagulation for now.    #Unstageable Sacral Decub -   debridement bedside 1/19/20 and 1/21/2020, possibly another debridement today 1/31/2020  debridement in OR 1/23   WOUND VAC at facility if needed  surgery following--> recommend santyl   Pain control    #Oropharyngeal dysphagia   - failed MBS   - s/p PEG , tolerating TF  -  wants repeat study as patient requesting food.  - Patient to have repeat MBS today 1/31/2020    #Right pneumonia - suspected  aspiration  - treated initially abx restarted  for worsening infiltrate / CXR  - Abx completed   - has not been using bipap nor requires it     #bilateral superficial vein thrombosis on US doppler of upper ext and DVT on left brachial  vein around the catheter  - vascular rec anticoagulation due to GI bleed will hold off;  - poor candidate for full anticoagulation due to GI bleed on this admission and again in the past  - cont aspirin for now    #Acute on chronic diastolic CHF/ Acute hypoxic respiratory failure- improving   - cont HD for fluid removal   - Bipap as need.   -Supplemental oxygen via  NC comfortable     #Acute blood loss Anemia  - Hx of recurrent GI bleed? prior EGDs/ colonoscopies this year which were negative, prior CTA with +transverse colon bleed (Jan of 2019)  - Seen by GI on this admission, no further work up   - repeat blood transfusion required for drop in hgb  - cont Protonix daily  - Hemoglobin stable    #CASTAÑEDA Cirrhosis with portal gastropathy and AVMs  - change lactulose to standing through PEG with holding parameters  - c/w rifaximin  - cont PPI    #KEITH/ATN on CKD- started on new HD   - cont HD per nephrology   - nephrology follow up appreciated  - HD TTS schedule  - needs outpt HD set up  - Continue QUENTIN , PRBC transfusion prn with HD    #DM2  - Cont sliding scale insulin coverage   - Fingersticks  - Hypoglycemic protocols in place    Palliative care evaluation prior, however at this time family not for DNR/DNI and want all possible treatment options.  Patient to be discharged to [facility] today 1/31/2020 after MBS

## 2020-01-31 NOTE — SWALLOW VFSS/MBS ASSESSMENT ADULT - SLP GENERAL OBSERVATIONS
Pt received & seen seated upright via stretcher in radiology, +awake/alert, +O2 NC, +reduced cognition, 0/10 pain
Pt received in Radiology suite, A&A, +NGT, reduced cognition, +wet baseline cough, c/o pain in buttocks 10/10, repositioned to improve comfort with good success

## 2020-01-31 NOTE — SWALLOW VFSS/MBS ASSESSMENT ADULT - RESIDUE IN VALLECULAE
Mild/reduced with cued successive swallow Mild/Moderate/reduced with cued successive swallow reduced with cued successive swallows/Mild Mild/reduced with cued successive swallows/Moderate Moderate/Mild/reduced with cued successive swallow

## 2020-01-31 NOTE — SWALLOW VFSS/MBS ASSESSMENT ADULT - ADDITIONAL RECOMMENDATIONS
[facility], & small cup sips of nectar thick fluids, with treating SLP, with diet upgrade as appropriate  Trial dysphagia tx, as schedule permits

## 2020-01-31 NOTE — SWALLOW VFSS/MBS ASSESSMENT ADULT - SLP PERTINENT HISTORY OF CURRENT PROBLEM
Pt known to this dept with hx of dysphagia this admission. Pt seen for MBS 1/6/20 with RX for NPO
Per MD note: "65 yo female with hx of CHFpEF, CAD, CKD, HTN, DM, cirrhosis, portal gastropathy, recurrent GI bleed, blind, sacral decub ulcer, presented to the ED from nursing home with encephalopathy, hypoxic resp failure requiring bipap, in ED found to have anemia with Hb 5 that responded to blood transfusions (hx of recurrent GI bleed for which multiple EGDs/colonoscopies have been performed, no further invasive work up as per GI), sepsis secondary to PNA on abx, KEITH on CKD now requiring HD via left IJ. tunnelled cath palced for access on 1/3/20. PT failed s/s and is NPO. NGT feeding was started  and family to make decisions over the weekend. multiple family conversations ongoing"

## 2020-01-31 NOTE — SWALLOW VFSS/MBS ASSESSMENT ADULT - ROSENBEK'S PENETRATION ASPIRATION SCALE
(1) no aspiration, contrast does not enter airway (3) contrast remains above the vocal cords, visible residue remains (penetration)/in 1/5 trials only; 1: no penetration/aspiration in 4/5 trials (5) contrast contacts vocal cords, visible residue remains (penetration)

## 2020-01-31 NOTE — PROGRESS NOTE ADULT - ASSESSMENT
1) KEITH on CKD- now deemed ESRD  2) encephalopathy- improved  4) Acute blood loss anemia superimposed on anemia of CKD  5) Hypotension- BP now improved    HD TTS schedule - HD tomorrow  Continue QUENTIN , PRBC transfusion prn with HD  Monitor lytes, BP and UOP    Will follow

## 2020-01-31 NOTE — SWALLOW VFSS/MBS ASSESSMENT ADULT - MODE OF PRESENTATION
fed by clinician/cup/self fed/self fed when presented via cup/spoon spoon/fed by clinician cup/self fed pt noted to take large cup sips (? impacted by reduced vision versus reduced cognition)/cup/self fed fed by clinician/spoon fed by clinician

## 2020-01-31 NOTE — SWALLOW VFSS/MBS ASSESSMENT ADULT - NS SWALLOW VFSS REC ASPIR MON
change of breathing pattern/oral hygiene/position upright (90Y)/upper respiratory infection/cough/throat clearing/pneumonia/fever/gurgly voice
upper respiratory infection/change of breathing pattern/oral hygiene/fever/position upright (90Y)/pneumonia/cough/gurgly voice/throat clearing

## 2020-01-31 NOTE — SWALLOW VFSS/MBS ASSESSMENT ADULT - RECOMMENDED FEEDING/EATING TECHNIQUES
oral hygiene
provide rest periods between swallows/no straws/oral hygiene/small sips/bites/cue pt to swallow x2 after each bite/sip/maintain upright posture during/after eating for 30 mins/crush medication (when feasible)/position upright (90 degrees)

## 2020-01-31 NOTE — SWALLOW VFSS/MBS ASSESSMENT ADULT - ORAL PHASE
Uncontrolled bolus / spillover in erik-pharynx/Delayed oral transit time/Residue in oral cavity Uncontrolled bolus / spillover in erik-pharynx/Uncontrolled bolus / spillover in hypopharynx Delayed oral transit time/Uncontrolled bolus / spillover in erik-pharynx/Uncontrolled bolus / spillover in hypopharynx Reduced anterior - posterior transport/Uncontrolled bolus / spillover in erik-pharynx/Delayed oral transit time Uncontrolled bolus / spillover in erik-pharynx/Delayed oral transit time/Reduced anterior - posterior transport/Residue in oral cavity Uncontrolled bolus / spillover in erik-pharynx

## 2020-01-31 NOTE — SWALLOW VFSS/MBS ASSESSMENT ADULT - COMMENTS
As per MD note: "65 yo female with hx of CHFpEF, CAD, CKD, HTN, DM2, cirrhosis, portal gastropathy, recurrent GI bleed, blind, sacral decub ulcer, presented to the ED from nursing home with encephalopathy, hypoxic resp failure requiring bipap, in ED found to have anemia with Hb 5 that responded to blood transfusions (hx of recurrent GI bleed for which multiple EGDs/colonoscopies have been performed, no further invasive work up as per GI). Also noted to have sepsis on admission that was secondary to PNA, possibly gram negative bacteria. Patient completed a course of zosyn. Also with KEITH on CKD now requiring HD via left IJ. Right chest tunnelled cath placed for access on 1/3/20. Patient failed speech and swallow and is NPO. NGT feeding was started and family to make now opting for PEG. Had upper ext mapping US showed left brachial veins DVT around the catheter and superficial right basilic and left cephalic vein thrombosis which vascular recommends full anticoagulation if not contraindicated due to GI bleed on admission; discussed with GI to hold on full dose anticoagulation for now."

## 2020-02-01 LAB
ANION GAP SERPL CALC-SCNC: 9 MMOL/L — SIGNIFICANT CHANGE UP (ref 5–17)
BUN SERPL-MCNC: 48 MG/DL — HIGH (ref 8–20)
CALCIUM SERPL-MCNC: 8.2 MG/DL — LOW (ref 8.6–10.2)
CHLORIDE SERPL-SCNC: 95 MMOL/L — LOW (ref 98–107)
CO2 SERPL-SCNC: 27 MMOL/L — SIGNIFICANT CHANGE UP (ref 22–29)
CREAT SERPL-MCNC: 2.14 MG/DL — HIGH (ref 0.5–1.3)
GLUCOSE BLDC GLUCOMTR-MCNC: 122 MG/DL — HIGH (ref 70–99)
GLUCOSE BLDC GLUCOMTR-MCNC: 132 MG/DL — HIGH (ref 70–99)
GLUCOSE BLDC GLUCOMTR-MCNC: 215 MG/DL — HIGH (ref 70–99)
GLUCOSE BLDC GLUCOMTR-MCNC: 296 MG/DL — HIGH (ref 70–99)
GLUCOSE SERPL-MCNC: 193 MG/DL — HIGH (ref 70–99)
HCT VFR BLD CALC: 27.9 % — LOW (ref 34.5–45)
HGB BLD-MCNC: 9 G/DL — LOW (ref 11.5–15.5)
MCHC RBC-ENTMCNC: 27.3 PG — SIGNIFICANT CHANGE UP (ref 27–34)
MCHC RBC-ENTMCNC: 32.3 GM/DL — SIGNIFICANT CHANGE UP (ref 32–36)
MCV RBC AUTO: 84.5 FL — SIGNIFICANT CHANGE UP (ref 80–100)
PLATELET # BLD AUTO: 384 K/UL — SIGNIFICANT CHANGE UP (ref 150–400)
POTASSIUM SERPL-MCNC: 4 MMOL/L — SIGNIFICANT CHANGE UP (ref 3.5–5.3)
POTASSIUM SERPL-SCNC: 4 MMOL/L — SIGNIFICANT CHANGE UP (ref 3.5–5.3)
RBC # BLD: 3.3 M/UL — LOW (ref 3.8–5.2)
RBC # FLD: 18.7 % — HIGH (ref 10.3–14.5)
SODIUM SERPL-SCNC: 131 MMOL/L — LOW (ref 135–145)
WBC # BLD: 6.87 K/UL — SIGNIFICANT CHANGE UP (ref 3.8–10.5)
WBC # FLD AUTO: 6.87 K/UL — SIGNIFICANT CHANGE UP (ref 3.8–10.5)

## 2020-02-01 PROCEDURE — 99232 SBSQ HOSP IP/OBS MODERATE 35: CPT

## 2020-02-01 PROCEDURE — 99231 SBSQ HOSP IP/OBS SF/LOW 25: CPT | Mod: GC

## 2020-02-01 PROCEDURE — 90937 HEMODIALYSIS REPEATED EVAL: CPT

## 2020-02-01 RX ADMIN — Medication 6: at 06:18

## 2020-02-01 RX ADMIN — Medication 1 APPLICATION(S): at 06:02

## 2020-02-01 RX ADMIN — Medication 1 APPLICATION(S): at 18:52

## 2020-02-01 RX ADMIN — MORPHINE SULFATE 2 MILLIGRAM(S): 50 CAPSULE, EXTENDED RELEASE ORAL at 20:00

## 2020-02-01 RX ADMIN — LACTULOSE 10 GRAM(S): 10 SOLUTION ORAL at 06:04

## 2020-02-01 RX ADMIN — Medication 1 APPLICATION(S): at 11:15

## 2020-02-01 RX ADMIN — HEPARIN SODIUM 5000 UNIT(S): 5000 INJECTION INTRAVENOUS; SUBCUTANEOUS at 06:01

## 2020-02-01 RX ADMIN — ERYTHROPOIETIN 10000 UNIT(S): 10000 INJECTION, SOLUTION INTRAVENOUS; SUBCUTANEOUS at 16:36

## 2020-02-01 RX ADMIN — HEPARIN SODIUM 5000 UNIT(S): 5000 INJECTION INTRAVENOUS; SUBCUTANEOUS at 23:36

## 2020-02-01 RX ADMIN — MORPHINE SULFATE 2 MILLIGRAM(S): 50 CAPSULE, EXTENDED RELEASE ORAL at 19:23

## 2020-02-01 RX ADMIN — PANTOPRAZOLE SODIUM 40 MILLIGRAM(S): 20 TABLET, DELAYED RELEASE ORAL at 06:03

## 2020-02-01 RX ADMIN — Medication 81 MILLIGRAM(S): at 11:15

## 2020-02-01 RX ADMIN — Medication 4: at 23:36

## 2020-02-01 RX ADMIN — PANTOPRAZOLE SODIUM 40 MILLIGRAM(S): 20 TABLET, DELAYED RELEASE ORAL at 18:52

## 2020-02-01 NOTE — PROGRESS NOTE ADULT - ASSESSMENT
1) KEITH on CKD- now deemed ESRD  2) encephalopathy- improved  4) Acute blood loss anemia superimposed on anemia of CKD  5) Hypotension- BP now improved    HD TTS schedule - HD today  Continue QUENTIN , PRBC transfusion prn with HD  Monitor lytes, BP and UOP    Will follow

## 2020-02-01 NOTE — PROGRESS NOTE ADULT - SUBJECTIVE AND OBJECTIVE BOX
INTERVAL HPI/OVERNIGHT EVENTS:    No acute overnight events reported. Dressings changed this AM. Remains hemodynamically stable and denies fevers, chills. No CP or SOB Primary team planning for discharge at this time      MEDICATIONS  (STANDING):  aspirin  chewable 81 milliGRAM(s) Oral daily  chlorhexidine 4% Liquid 1 Application(s) Topical <User Schedule>  collagenase Ointment 1 Application(s) Topical two times a day  Dakins Solution - 1/2 Strength 1 Application(s) Topical daily  dextrose 5%. 1000 milliLiter(s) (50 mL/Hr) IV Continuous <Continuous>  dextrose 50% Injectable 12.5 Gram(s) IV Push once  dextrose 50% Injectable 25 Gram(s) IV Push once  dextrose 50% Injectable 25 Gram(s) IV Push once  epoetin sara Injectable 12967 Unit(s) IV Push <User Schedule>  heparin  Injectable 5000 Unit(s) SubCutaneous every 8 hours  insulin lispro (HumaLOG) corrective regimen sliding scale   SubCutaneous every 6 hours  lactulose Syrup 10 Gram(s) Oral two times a day  pantoprazole  Injectable 40 milliGRAM(s) IV Push two times a day    MEDICATIONS  (PRN):  acetaminophen    Suspension .. 650 milliGRAM(s) Enteral Tube every 6 hours PRN Temp greater or equal to 38C (100.4F), Mild Pain (1 - 3), Moderate Pain (4 - 6)  albuterol/ipratropium for Nebulization 3 milliLiter(s) Nebulizer every 6 hours PRN Shortness of Breath and/or Wheezing  dextrose 40% Gel 15 Gram(s) Oral once PRN Blood Glucose LESS THAN 70 milliGRAM(s)/deciLiter  glucagon  Injectable 1 milliGRAM(s) IntraMuscular once PRN Glucose <70 milliGRAM(s)/deciLiter  morphine  - Injectable 2 milliGRAM(s) IV Push every 4 hours PRN Severe Pain (7 - 10)  sodium chloride 0.9% lock flush 10 milliLiter(s) IV Push every 1 hour PRN Pre/post blood products, medications, blood draw, and to maintain line patency      Vital Signs Last 24 Hrs  T(C): 37 (31 Jan 2020 23:09), Max: 37 (31 Jan 2020 23:09)  T(F): 98.6 (31 Jan 2020 23:09), Max: 98.6 (31 Jan 2020 23:09)  HR: 89 (31 Jan 2020 23:09) (85 - 97)  BP: 111/66 (31 Jan 2020 23:09) (95/59 - 111/66)  BP(mean): --  RR: 18 (31 Jan 2020 23:09) (18 - 18)  SpO2: 98% (31 Jan 2020 23:09) (95% - 98%)    Physical Exam:  Gen: patient resting comfortably in bed, in no acute distress  Respiratory: no accessory muscle use, no conversational dyspnea  Cardiovascular: regular rate & rhythm  Gastrointestinal: Abdomen soft, non-tender, non-distended  Skin: Large sacral ulcer with some areas of fibrinous exudate in the areas surrounding central region of wound, No purulent drainage or surrounding cellulitis to skin edges. Depth beyond subcutaneous fat, infiltrating muscle/bone. 8x11.5x1.5 cm ulceration  Musculoskeletal: Baseline paraplegia      I&O's Detail    30 Jan 2020 07:01  -  31 Jan 2020 07:00  --------------------------------------------------------  IN:    Nepro: 420 mL  Total IN: 420 mL    OUT:    Other: 1500 mL  Total OUT: 1500 mL    Total NET: -1080 mL      31 Jan 2020 07:01  -  01 Feb 2020 03:45  --------------------------------------------------------  IN:    Enteral Tube Flush: 75 mL    Nepro: 640 mL  Total IN: 715 mL    OUT:  Total OUT: 0 mL    Total NET: 715 mL          LABS:                        9.0    7.18  )-----------( 348      ( 30 Jan 2020 10:02 )             28.3     01-30    133<L>  |  94<L>  |  44.0<H>  ----------------------------<  193<H>  3.8   |  29.0  |  2.20<H>    Ca    8.2<L>      30 Jan 2020 10:02            RADIOLOGY & ADDITIONAL STUDIES:

## 2020-02-01 NOTE — PROGRESS NOTE ADULT - SUBJECTIVE AND OBJECTIVE BOX
KUN KATETYRELL  ----------------------------------------  The patient was seen and evaluated for decubitus ulcer. Offers no complaints. Denied dyspnea of chest pain.    Vital Signs Last 24 Hrs  T(C): 36.8 (01 Feb 2020 07:37), Max: 37 (31 Jan 2020 23:09)  T(F): 98.2 (01 Feb 2020 07:37), Max: 98.6 (31 Jan 2020 23:09)  HR: 84 (01 Feb 2020 07:37) (84 - 97)  BP: 114/63 (01 Feb 2020 07:37) (109/63 - 114/63)  BP(mean): --  RR: 19 (01 Feb 2020 07:37) (18 - 19)  SpO2: 99% (01 Feb 2020 07:37) (95% - 99%)    CAPILLARY BLOOD GLUCOSE  POCT Blood Glucose.: 296 mg/dL (01 Feb 2020 06:15)  POCT Blood Glucose.: 219 mg/dL (31 Jan 2020 23:21)  POCT Blood Glucose.: 184 mg/dL (31 Jan 2020 16:35)  POCT Blood Glucose.: 274 mg/dL (31 Jan 2020 13:05)    PHYSICAL EXAMINATION:  ----------------------------------------  General appearance: No acute distress, Awake, Alert  HEENT: Normocephalic, Atraumatic, Conjunctiva clear, EOMI  Neck: Supple, No JVD, No tenderness  Lungs: Breath sound equal bilaterally, No wheezes, No rales  Cardiovascular: S1S2, Regular rhythm, Right chest wall catheter in place  Abdomen: Soft, Nontender, Nondistended, No guarding/rebound, Positive bowel sounds, Gastrostomy tube in place  Extremities: No clubbing, No cyanosis, No edema, No calf tenderness, Right toe amputations  Neuro: Strength equal bilaterally, No tremors  Psychiatric: Appropriate mood, Normal affect    LABORATORY STUDIES:  ----------------------------------------             9.0    7.18  )-----------( 348      ( 30 Jan 2020 10:02 )             28.3     01-30    133<L>  |  94<L>  |  44.0<H>  ----------------------------<  193<H>  3.8   |  29.0  |  2.20<H>    Ca    8.2<L>      30 Jan 2020 10:02    MEDICATIONS  (STANDING):  aspirin  chewable 81 milliGRAM(s) Oral daily  chlorhexidine 4% Liquid 1 Application(s) Topical <User Schedule>  collagenase Ointment 1 Application(s) Topical two times a day  Dakins Solution - 1/2 Strength 1 Application(s) Topical daily  dextrose 5%. 1000 milliLiter(s) (50 mL/Hr) IV Continuous <Continuous>  dextrose 50% Injectable 12.5 Gram(s) IV Push once  dextrose 50% Injectable 25 Gram(s) IV Push once  dextrose 50% Injectable 25 Gram(s) IV Push once  epoetin sara Injectable 02459 Unit(s) IV Push <User Schedule>  heparin  Injectable 5000 Unit(s) SubCutaneous every 8 hours  insulin lispro (HumaLOG) corrective regimen sliding scale   SubCutaneous every 6 hours  lactulose Syrup 10 Gram(s) Oral two times a day  pantoprazole  Injectable 40 milliGRAM(s) IV Push two times a day    MEDICATIONS  (PRN):  acetaminophen    Suspension .. 650 milliGRAM(s) Enteral Tube every 6 hours PRN Temp greater or equal to 38C (100.4F), Mild Pain (1 - 3), Moderate Pain (4 - 6)  albuterol/ipratropium for Nebulization 3 milliLiter(s) Nebulizer every 6 hours PRN Shortness of Breath and/or Wheezing  dextrose 40% Gel 15 Gram(s) Oral once PRN Blood Glucose LESS THAN 70 milliGRAM(s)/deciLiter  glucagon  Injectable 1 milliGRAM(s) IntraMuscular once PRN Glucose <70 milliGRAM(s)/deciLiter  morphine  - Injectable 2 milliGRAM(s) IV Push every 4 hours PRN Severe Pain (7 - 10)  sodium chloride 0.9% lock flush 10 milliLiter(s) IV Push every 1 hour PRN Pre/post blood products, medications, blood draw, and to maintain line patency      ASSESSMENT / PLAN:  ----------------------------------------  63 yo female with hx of CHFpEF, CAD, CKD, HTN, DM2, cirrhosis, portal gastropathy, recurrent GI bleed, blind, sacral decub ulcer, presented to the ED from nursing home with encephalopathy, hypoxic resp failure requiring bipap, in ED found to have anemia with Hb 5 that responded to blood transfusions (hx of recurrent GI bleed for which multiple EGDs/colonoscopies have been performed, no further invasive work up as per GI). Also noted to have sepsis on admission that was secondary to PNA, possibly gram negative bacteria. Patient completed a course of zosyn. Also with KEITH on CKD now requiring HD via left IJ. Right chest tunnelled cath placed for access on 1/3/20. Patient failed speech and swallow and is NPO. NGT feeding was started and family to make now opting for PEG. Had upper ext mapping US showed left brachial veins DVT around the catheter and superficial right basilic and left cephalic vein thrombosis which vascular recommends full anticoagulation if not contraindicated due to GI bleed on admission; discussed with GI to hold on full dose anticoagulation for now.    Sacral decubitus ulcer - Prior debridements noted. Surgery follow up noted. To continue with collagenase. Pressure relieving bed in use. Analgesic medications as needed.    Dysphagia - Was on gastrostomy tube feeds. Repeat modified barium swallow noted. Moderate dysphagia noted with recommendations for trial of puree diet with honey thick consistency fluids. Aspiration precautions.    Pneumonia - Suspect aspiration. Prior courses of antibiotics completed. Afebrile today and without leukocytosis.    Bilateral upper extremity superficial vein thrombosis and left deep venous thrombosis - Poor candidate for anticoagulation due to anemia and history of recurrent gastrointestinal hemorrhage.    Chronic kidney disease with acute kidney injury - Currently on hemodialysis. To follow up with Nephrology.    Acute on chronic diastolic heart failure - Continue with hemodialysis as scheduled. Supplemental oxygen as needed.    Acute blood loss anemia - Prior transfusion of packed red blood cells noted. On pantoprazole. On erythropoietin.    CASTAÑEDA cirrhosis with portal gastropathy - On lactulose. Ammonia level was not elevated.    Diabetes - Insulin coverage, close monitoring of blood glucose levels. KUN KATETYRELL  ----------------------------------------  The patient was seen and evaluated for decubitus ulcer. Offers no complaints. Denied dyspnea of chest pain.    Vital Signs Last 24 Hrs  T(C): 36.8 (01 Feb 2020 07:37), Max: 37 (31 Jan 2020 23:09)  T(F): 98.2 (01 Feb 2020 07:37), Max: 98.6 (31 Jan 2020 23:09)  HR: 84 (01 Feb 2020 07:37) (84 - 97)  BP: 114/63 (01 Feb 2020 07:37) (109/63 - 114/63)  BP(mean): --  RR: 19 (01 Feb 2020 07:37) (18 - 19)  SpO2: 99% (01 Feb 2020 07:37) (95% - 99%)    CAPILLARY BLOOD GLUCOSE  POCT Blood Glucose.: 296 mg/dL (01 Feb 2020 06:15)  POCT Blood Glucose.: 219 mg/dL (31 Jan 2020 23:21)  POCT Blood Glucose.: 184 mg/dL (31 Jan 2020 16:35)  POCT Blood Glucose.: 274 mg/dL (31 Jan 2020 13:05)    PHYSICAL EXAMINATION:  ----------------------------------------  General appearance: No acute distress, Awake, Alert  HEENT: Normocephalic, Atraumatic, Conjunctiva clear, EOMI, Right cornea cloudy  Neck: Supple, No JVD, No tenderness  Lungs: Breath sound equal bilaterally, No wheezes, No rales  Cardiovascular: S1S2, Regular rhythm, Right chest wall catheter in place  Abdomen: Soft, Nontender, Nondistended, No guarding/rebound, Positive bowel sounds, Gastrostomy tube in place  Extremities: No clubbing, No cyanosis, No edema, No calf tenderness, Right toe amputations  Neuro: Strength equal bilaterally, No tremors  Psychiatric: Appropriate mood, Normal affect    LABORATORY STUDIES:  ----------------------------------------             9.0    7.18  )-----------( 348      ( 30 Jan 2020 10:02 )             28.3     01-30    133<L>  |  94<L>  |  44.0<H>  ----------------------------<  193<H>  3.8   |  29.0  |  2.20<H>    Ca    8.2<L>      30 Jan 2020 10:02    MEDICATIONS  (STANDING):  aspirin  chewable 81 milliGRAM(s) Oral daily  chlorhexidine 4% Liquid 1 Application(s) Topical <User Schedule>  collagenase Ointment 1 Application(s) Topical two times a day  Dakins Solution - 1/2 Strength 1 Application(s) Topical daily  dextrose 5%. 1000 milliLiter(s) (50 mL/Hr) IV Continuous <Continuous>  dextrose 50% Injectable 12.5 Gram(s) IV Push once  dextrose 50% Injectable 25 Gram(s) IV Push once  dextrose 50% Injectable 25 Gram(s) IV Push once  epoetin sara Injectable 19875 Unit(s) IV Push <User Schedule>  heparin  Injectable 5000 Unit(s) SubCutaneous every 8 hours  insulin lispro (HumaLOG) corrective regimen sliding scale   SubCutaneous every 6 hours  lactulose Syrup 10 Gram(s) Oral two times a day  pantoprazole  Injectable 40 milliGRAM(s) IV Push two times a day    MEDICATIONS  (PRN):  acetaminophen    Suspension .. 650 milliGRAM(s) Enteral Tube every 6 hours PRN Temp greater or equal to 38C (100.4F), Mild Pain (1 - 3), Moderate Pain (4 - 6)  albuterol/ipratropium for Nebulization 3 milliLiter(s) Nebulizer every 6 hours PRN Shortness of Breath and/or Wheezing  dextrose 40% Gel 15 Gram(s) Oral once PRN Blood Glucose LESS THAN 70 milliGRAM(s)/deciLiter  glucagon  Injectable 1 milliGRAM(s) IntraMuscular once PRN Glucose <70 milliGRAM(s)/deciLiter  morphine  - Injectable 2 milliGRAM(s) IV Push every 4 hours PRN Severe Pain (7 - 10)  sodium chloride 0.9% lock flush 10 milliLiter(s) IV Push every 1 hour PRN Pre/post blood products, medications, blood draw, and to maintain line patency      ASSESSMENT / PLAN:  ----------------------------------------  63 yo female with hx of CHFpEF, CAD, CKD, HTN, DM2, cirrhosis, portal gastropathy, recurrent GI bleed, blind, sacral decub ulcer, presented to the ED from nursing home with encephalopathy, hypoxic resp failure requiring bipap, in ED found to have anemia with Hb 5 that responded to blood transfusions (hx of recurrent GI bleed for which multiple EGDs/colonoscopies have been performed, no further invasive work up as per GI). Also noted to have sepsis on admission that was secondary to PNA, possibly gram negative bacteria. Patient completed a course of zosyn. Also with KEITH on CKD now requiring HD via left IJ. Right chest tunnelled cath placed for access on 1/3/20. Patient failed speech and swallow and is NPO. NGT feeding was started and family to make now opting for PEG. Had upper ext mapping US showed left brachial veins DVT around the catheter and superficial right basilic and left cephalic vein thrombosis which vascular recommends full anticoagulation if not contraindicated due to GI bleed on admission; discussed with GI to hold on full dose anticoagulation for now.    Sacral decubitus ulcer - Prior debridements noted. Surgery follow up noted. To continue with collagenase. Pressure relieving bed in use. Analgesic medications as needed.    Dysphagia - Was on gastrostomy tube feeds. Repeat modified barium swallow noted. Moderate dysphagia noted with recommendations for trial of puree diet with honey thick consistency fluids. Aspiration precautions.    Pneumonia - Suspect aspiration. Prior courses of antibiotics completed. Afebrile today and without leukocytosis.    Bilateral upper extremity superficial vein thrombosis and left deep venous thrombosis - Poor candidate for anticoagulation due to anemia and history of recurrent gastrointestinal hemorrhage.    Chronic kidney disease with acute kidney injury - Currently on hemodialysis. To follow up with Nephrology.    Acute on chronic diastolic heart failure - Continue with hemodialysis as scheduled. Supplemental oxygen as needed.    Acute blood loss anemia - Prior transfusion of packed red blood cells noted. On pantoprazole. On erythropoietin.    CASTAÑEDA cirrhosis with portal gastropathy - On lactulose. Ammonia level was not elevated.    Diabetes - Insulin coverage, close monitoring of blood glucose levels.

## 2020-02-01 NOTE — PROGRESS NOTE ADULT - SUBJECTIVE AND OBJECTIVE BOX
[facility] DIVISION OF KIDNEY DISEASES AND HYPERTENSION -- HEMODIALYSIS NOTE  --------------------------------------------------------------------------------  Chief Complaint: ESRD/Ongoing hemodialysis requirement    24 hour events/subjective:  Pt seen and examined; states she feels well  No acute complaint        PAST HISTORY  --------------------------------------------------------------------------------  No significant changes to PMH, PSH, FHx, SHx, unless otherwise noted    ALLERGIES & MEDICATIONS  --------------------------------------------------------------------------------  Allergies    ACE inhibitors (Other (Mild))    Intolerances    oxycodone (Sedation/Somnol)    Standing Inpatient Medications  aspirin  chewable 81 milliGRAM(s) Oral daily  chlorhexidine 4% Liquid 1 Application(s) Topical <User Schedule>  collagenase Ointment 1 Application(s) Topical two times a day  Dakins Solution - 1/2 Strength 1 Application(s) Topical daily  dextrose 5%. 1000 milliLiter(s) IV Continuous <Continuous>  dextrose 50% Injectable 12.5 Gram(s) IV Push once  dextrose 50% Injectable 25 Gram(s) IV Push once  dextrose 50% Injectable 25 Gram(s) IV Push once  epoetin sara Injectable 59031 Unit(s) IV Push <User Schedule>  heparin  Injectable 5000 Unit(s) SubCutaneous every 8 hours  insulin lispro (HumaLOG) corrective regimen sliding scale   SubCutaneous every 6 hours  lactulose Syrup 10 Gram(s) Oral two times a day  pantoprazole  Injectable 40 milliGRAM(s) IV Push two times a day    PRN Inpatient Medications  acetaminophen    Suspension .. 650 milliGRAM(s) Enteral Tube every 6 hours PRN  albuterol/ipratropium for Nebulization 3 milliLiter(s) Nebulizer every 6 hours PRN  dextrose 40% Gel 15 Gram(s) Oral once PRN  glucagon  Injectable 1 milliGRAM(s) IntraMuscular once PRN  morphine  - Injectable 2 milliGRAM(s) IV Push every 4 hours PRN  sodium chloride 0.9% lock flush 10 milliLiter(s) IV Push every 1 hour PRN      REVIEW OF SYSTEMS  --------------------------------------------------------------------------------  Gen: No weight changes, fatigue, fevers/chills, weakness  Skin: No rashes  Head/Eyes/Ears/Mouth: No headache  Respiratory: No dyspnea, cough,  CV: No chest pain, orthopnea  GI: No abdominal pain, diarrhea, constipation, nausea, vomiting,  MSK: No joint pain  Neuro: No dizziness/lightheadedness, weakness  Heme: No bleeding  Psych: No significant nervousness, anxiety, stress, depression    All other systems were reviewed and are negative, except as noted.    VITALS/PHYSICAL EXAM  --------------------------------------------------------------------------------  T(C): 36.8 (02-01-20 @ 07:37), Max: 37 (01-31-20 @ 23:09)  HR: 84 (02-01-20 @ 07:37) (84 - 97)  BP: 114/63 (02-01-20 @ 07:37) (109/63 - 114/63)  RR: 19 (02-01-20 @ 07:37) (18 - 19)  SpO2: 99% (02-01-20 @ 07:37) (95% - 99%)  Wt(kg): --        01-31-20 @ 07:01  -  02-01-20 @ 07:00  --------------------------------------------------------  IN: 715 mL / OUT: 0 mL / NET: 715 mL      Physical Exam:  	Gen: NAD  	HEENT: on NC  	Pulm: CTA B/L  	CV: RRR, S1S2; no rub  	Abd: +BS, soft, nontender  	UE: Warm,  	LE: Warm, edema  	Neuro: No focal deficits  	Skin: Warm, without rashes  	Vascular access: RILINDA tunneled HD catheter    LABS/STUDIES  --------------------------------------------------------------------------------      Iron 25, TIBC 154, %sat 16      [01-02-20 @ 09:39]  Ferritin 668      [01-02-20 @ 09:39]  PTH -- (Ca 8.0)      [01-02-20 @ 17:06]   119  PTH -- (Ca 9.1)      [05-30-19 @ 18:09]   107  Vitamin D (25OH) 25.2      [01-02-20 @ 17:06]  HbA1c 5.8      [12-26-19 @ 09:04]  TSH 1.10      [05-17-19 @ 12:21

## 2020-02-01 NOTE — PROGRESS NOTE ADULT - ASSESSMENT
63 yo F wit paraplegia presented from nursing home for Stage III-IV sacral decubitus ulcer being followed by surgery for wound care.    - Continue santyl debridement of the wound, BID dressing changes, surgery will place wound vac if patient not being discharged  - Offloading bed and boots recommended Patient should be on a [place] bed, with offloading   - Pain control  - ok for discharge per primary team

## 2020-02-02 LAB
GLUCOSE BLDC GLUCOMTR-MCNC: 113 MG/DL — HIGH (ref 70–99)
GLUCOSE BLDC GLUCOMTR-MCNC: 148 MG/DL — HIGH (ref 70–99)
GLUCOSE BLDC GLUCOMTR-MCNC: 218 MG/DL — HIGH (ref 70–99)
GLUCOSE BLDC GLUCOMTR-MCNC: 252 MG/DL — HIGH (ref 70–99)

## 2020-02-02 PROCEDURE — 99232 SBSQ HOSP IP/OBS MODERATE 35: CPT

## 2020-02-02 PROCEDURE — 99233 SBSQ HOSP IP/OBS HIGH 50: CPT

## 2020-02-02 RX ADMIN — Medication 6: at 23:04

## 2020-02-02 RX ADMIN — LACTULOSE 10 GRAM(S): 10 SOLUTION ORAL at 17:05

## 2020-02-02 RX ADMIN — Medication 1 APPLICATION(S): at 05:13

## 2020-02-02 RX ADMIN — MORPHINE SULFATE 2 MILLIGRAM(S): 50 CAPSULE, EXTENDED RELEASE ORAL at 23:55

## 2020-02-02 RX ADMIN — MORPHINE SULFATE 2 MILLIGRAM(S): 50 CAPSULE, EXTENDED RELEASE ORAL at 00:00

## 2020-02-02 RX ADMIN — PANTOPRAZOLE SODIUM 40 MILLIGRAM(S): 20 TABLET, DELAYED RELEASE ORAL at 17:05

## 2020-02-02 RX ADMIN — HEPARIN SODIUM 5000 UNIT(S): 5000 INJECTION INTRAVENOUS; SUBCUTANEOUS at 14:18

## 2020-02-02 RX ADMIN — Medication 4: at 11:37

## 2020-02-02 RX ADMIN — PANTOPRAZOLE SODIUM 40 MILLIGRAM(S): 20 TABLET, DELAYED RELEASE ORAL at 05:14

## 2020-02-02 RX ADMIN — HEPARIN SODIUM 5000 UNIT(S): 5000 INJECTION INTRAVENOUS; SUBCUTANEOUS at 05:13

## 2020-02-02 RX ADMIN — HEPARIN SODIUM 5000 UNIT(S): 5000 INJECTION INTRAVENOUS; SUBCUTANEOUS at 23:04

## 2020-02-02 RX ADMIN — MORPHINE SULFATE 2 MILLIGRAM(S): 50 CAPSULE, EXTENDED RELEASE ORAL at 23:36

## 2020-02-02 RX ADMIN — MORPHINE SULFATE 2 MILLIGRAM(S): 50 CAPSULE, EXTENDED RELEASE ORAL at 03:06

## 2020-02-02 RX ADMIN — Medication 1 APPLICATION(S): at 17:05

## 2020-02-02 RX ADMIN — Medication 81 MILLIGRAM(S): at 11:37

## 2020-02-02 RX ADMIN — Medication 1 APPLICATION(S): at 11:36

## 2020-02-02 NOTE — CHART NOTE - NSCHARTNOTEFT_GEN_A_CORE
Source: Patient [ x]  Family [ ]   other [ x] EMR and nursing    Current Diet: Diet, Dysphagia 1 Pureed-Honey Consistency Fluid:   For patients receiving Renal Replacement - No Protein Restr, No Conc K, No Conc Phos, Low  Sodium (RENAL)  Tube Feeding Modality: Gastrostomy  Nepro  Total Volume for 24 Hours (mL): 900  Continuous  Starting Tube Feed Rate {mL per Hour}: 30  Increase Tube Feed Rate by (mL): 15     Every hour  Until Goal Tube Feed Rate (mL per Hour): 50  Tube Feed Duration (in Hours): 18  Tube Feed Start Time: 12:00 (01-31-20 @ 11:52)    PO intake: Pt po intake varies per EMR, currently consuming 25%.     Enteral /Parenteral Nutrition: Nepro at 50mL/hr (x20 hours) providing 1000mL, 1800kcal, 81g pro     Current Weight:   (2/1) 172.1#  (1/30) 155.2#  (1/25) 160.9#  (1/23) 160.9#  (1/21) 164.6#  (1/18) 175.4#  (1/17) 152.3#  (1/14) 163.9#  (1/11) 165#  (1/9) 167#  (1/7) 148#  (1/4) 178#  (1/2) 187#   (12/29) 198.6#  (12/28) 214.5#  (12/27) 209.8#  (12/26) 214.9#  -Question accuracy of weights, will continue to monitor. Weight fluctuations could be due to fluid fluctuations, pt receiving HD. Generalized 1+ edema noted (1/28).     Pertinent Medications: MEDICATIONS  (STANDING):  aspirin  chewable 81 milliGRAM(s) Oral daily  chlorhexidine 4% Liquid 1 Application(s) Topical <User Schedule>  collagenase Ointment 1 Application(s) Topical two times a day  Dakins Solution - 1/2 Strength 1 Application(s) Topical daily  dextrose 5%. 1000 milliLiter(s) (50 mL/Hr) IV Continuous <Continuous>  dextrose 50% Injectable 12.5 Gram(s) IV Push once  dextrose 50% Injectable 25 Gram(s) IV Push once  dextrose 50% Injectable 25 Gram(s) IV Push once  epoetin sara Injectable 06901 Unit(s) IV Push <User Schedule>  heparin  Injectable 5000 Unit(s) SubCutaneous every 8 hours  insulin lispro (HumaLOG) corrective regimen sliding scale   SubCutaneous every 6 hours  lactulose Syrup 10 Gram(s) Oral two times a day  pantoprazole  Injectable 40 milliGRAM(s) IV Push two times a day    MEDICATIONS  (PRN):  acetaminophen    Suspension .. 650 milliGRAM(s) Enteral Tube every 6 hours PRN Temp greater or equal to 38C (100.4F), Mild Pain (1 - 3), Moderate Pain (4 - 6)  albuterol/ipratropium for Nebulization 3 milliLiter(s) Nebulizer every 6 hours PRN Shortness of Breath and/or Wheezing  dextrose 40% Gel 15 Gram(s) Oral once PRN Blood Glucose LESS THAN 70 milliGRAM(s)/deciLiter  glucagon  Injectable 1 milliGRAM(s) IntraMuscular once PRN Glucose <70 milliGRAM(s)/deciLiter  morphine  - Injectable 2 milliGRAM(s) IV Push every 4 hours PRN Severe Pain (7 - 10)  sodium chloride 0.9% lock flush 10 milliLiter(s) IV Push every 1 hour PRN Pre/post blood products, medications, blood draw, and to maintain line patency    Pertinent Labs: CBC Full  -  ( 01 Feb 2020 15:14 )  WBC Count : 6.87 K/uL  RBC Count : 3.30 M/uL  Hemoglobin : 9.0 g/dL  Hematocrit : 27.9 %  Platelet Count - Automated : 384 K/uL  Mean Cell Volume : 84.5 fl  Mean Cell Hemoglobin : 27.3 pg  Mean Cell Hemoglobin Concentration : 32.3 gm/dL  Auto Neutrophil # : x  Auto Lymphocyte # : x  Auto Monocyte # : x  Auto Eosinophil # : x  Auto Basophil # : x  Auto Neutrophil % : x  Auto Lymphocyte % : x  Auto Monocyte % : x  Auto Eosinophil % : x  Auto Basophil % : x    -No recent labs    Skin: IAD, R heel DM ulcer, Coccyx unstageable     Nutrition focused physical exam previously conducted - found signs of malnutrition [ x]absent [ ]present    Subcutaneous fat loss: [ ] Orbital fat pads region, [ ]Buccal fat region, [ ]Triceps region,  [ ]Ribs region    Muscle wasting: [ ]Temples region, [ ]Clavicle region, [ ]Shoulder region, [ ]Scapula region, [ ]Interosseous region,  [ ]thigh region, [ ]Calf region    Estimated Needs:   [x ] no change since previous assessment  [ ] recalculated:     Current Nutrition Diagnosis: Pt remains at high nutrition risk and meets criteria for moderate (chronic) malnutrition related to inability to consume increased protein energy intake in setting of unstageable sacral wound, hypovolemic shock for possible GI bleed/blood loss anemia as evidenced by pt meeting <75% estimated energy intake > 1 month and moderate edema. TF currently running during assessment. Pt reported no stomach discomfort. Pt reported drinking juice po and has been consuming 25% of meals per EMR. Pt requesting solid food (not puree). Aware of SLP assessment 1/31. RD to remain available.     Recommendations:   1) Encourage po intake per SLP rx  2) Monitor weights and labs  3) Monitor tolerance to TF    Monitoring and Evaluation:   [x ] PO intake [ x] Tolerance to diet prescription [X] Weights  [X] Follow up per protocol [X] Labs:

## 2020-02-02 NOTE — PROGRESS NOTE ADULT - SUBJECTIVE AND OBJECTIVE BOX
KUN KATETYRELL  ----------------------------------------  The patient was seen and evaluated for decubitus ulcer. Offers no complaints.    Vital Signs Last 24 Hrs  T(C): 36.8 (02 Feb 2020 07:30), Max: 36.8 (01 Feb 2020 14:24)  T(F): 98.2 (02 Feb 2020 07:30), Max: 98.3 (02 Feb 2020 00:09)  HR: 93 (02 Feb 2020 07:30) (88 - 97)  BP: 119/69 (02 Feb 2020 07:30) (108/65 - 138/56)  BP(mean): --  RR: 20 (02 Feb 2020 07:30) (18 - 20)  SpO2: 100% (02 Feb 2020 07:30) (100% - 100%)    CAPILLARY BLOOD GLUCOSE  POCT Blood Glucose.: 148 mg/dL (02 Feb 2020 06:18)  POCT Blood Glucose.: 215 mg/dL (01 Feb 2020 23:35)  POCT Blood Glucose.: 122 mg/dL (01 Feb 2020 17:48)  POCT Blood Glucose.: 132 mg/dL (01 Feb 2020 11:15)    PHYSICAL EXAMINATION:  ----------------------------------------  General appearance: No acute distress, Awake, Alert  HEENT: Normocephalic, Atraumatic, Conjunctiva clear, EOMI, Right cornea cloudy  Neck: Supple, No JVD, No tenderness  Lungs: Breath sound equal bilaterally, No wheezes, No rales  Cardiovascular: S1S2, Regular rhythm, Right chest wall catheter in place  Abdomen: Soft, Nontender, Nondistended, No guarding/rebound, Positive bowel sounds, Gastrostomy tube in place  Extremities: No clubbing, No cyanosis, No edema, No calf tenderness, Right toe amputations  Neuro: Strength equal bilaterally, No tremors  Psychiatric: Appropriate mood, Normal affect    LABORATORY STUDIES:  ----------------------------------------             9.0    6.87  )-----------( 384      ( 01 Feb 2020 15:14 )             27.9     02-01    131<L>  |  95<L>  |  48.0<H>  ----------------------------<  193<H>  4.0   |  27.0  |  2.14<H>    Ca    8.2<L>      01 Feb 2020 15:14    MEDICATIONS  (STANDING):  aspirin  chewable 81 milliGRAM(s) Oral daily  chlorhexidine 4% Liquid 1 Application(s) Topical <User Schedule>  collagenase Ointment 1 Application(s) Topical two times a day  Dakins Solution - 1/2 Strength 1 Application(s) Topical daily  dextrose 5%. 1000 milliLiter(s) (50 mL/Hr) IV Continuous <Continuous>  dextrose 50% Injectable 12.5 Gram(s) IV Push once  dextrose 50% Injectable 25 Gram(s) IV Push once  dextrose 50% Injectable 25 Gram(s) IV Push once  epoetin sara Injectable 98285 Unit(s) IV Push <User Schedule>  heparin  Injectable 5000 Unit(s) SubCutaneous every 8 hours  insulin lispro (HumaLOG) corrective regimen sliding scale   SubCutaneous every 6 hours  lactulose Syrup 10 Gram(s) Oral two times a day  pantoprazole  Injectable 40 milliGRAM(s) IV Push two times a day    MEDICATIONS  (PRN):  acetaminophen    Suspension .. 650 milliGRAM(s) Enteral Tube every 6 hours PRN Temp greater or equal to 38C (100.4F), Mild Pain (1 - 3), Moderate Pain (4 - 6)  albuterol/ipratropium for Nebulization 3 milliLiter(s) Nebulizer every 6 hours PRN Shortness of Breath and/or Wheezing  dextrose 40% Gel 15 Gram(s) Oral once PRN Blood Glucose LESS THAN 70 milliGRAM(s)/deciLiter  glucagon  Injectable 1 milliGRAM(s) IntraMuscular once PRN Glucose <70 milliGRAM(s)/deciLiter  morphine  - Injectable 2 milliGRAM(s) IV Push every 4 hours PRN Severe Pain (7 - 10)  sodium chloride 0.9% lock flush 10 milliLiter(s) IV Push every 1 hour PRN Pre/post blood products, medications, blood draw, and to maintain line patency      ASSESSMENT / PLAN:  ----------------------------------------  65 yo female with hx of CHFpEF, CAD, CKD, HTN, DM2, cirrhosis, portal gastropathy, recurrent GI bleed, blind, sacral decub ulcer, presented to the ED from nursing home with encephalopathy, hypoxic resp failure requiring bipap, in ED found to have anemia with Hb 5 that responded to blood transfusions (hx of recurrent GI bleed for which multiple EGDs/colonoscopies have been performed, no further invasive work up as per GI). Also noted to have sepsis on admission that was secondary to PNA, possibly gram negative bacteria. Patient completed a course of zosyn. Also with KEITH on CKD now requiring HD via left IJ. Right chest tunnelled cath placed for access on 1/3/20. Patient failed speech and swallow and is NPO. NGT feeding was started and family to make now opting for PEG. Had upper ext mapping US showed left brachial veins DVT around the catheter and superficial right basilic and left cephalic vein thrombosis which vascular recommends full anticoagulation if not contraindicated due to GI bleed on admission; discussed with GI to hold on full dose anticoagulation for now.    Sacral decubitus ulcer - Previous debridements noted. Pressure relieving bed in use. Analgesic medications as needed. Surgery follow up noted. To continue with collagenase.    Dysphagia - Repeat modified barium swallow noted. Moderate dysphagia noted with recommendations for trial of puree diet with honey thick consistency fluids. Aspiration precautions. Gstrostomy tube feeds to continue until the patient's ability to tolerate oral intake is determined.    Pneumonia - Suspect aspiration. Prior courses of antibiotics completed. Afebrile today and without leukocytosis.    Bilateral upper extremity superficial vein thrombosis and left deep venous thrombosis - Poor candidate for anticoagulation due to anemia and history of recurrent gastrointestinal hemorrhage.    Chronic kidney disease with acute kidney injury - Currently on hemodialysis. To follow up with Nephrology.    Acute on chronic diastolic heart failure - Continue with hemodialysis as scheduled. Supplemental oxygen as needed.    Acute blood loss anemia - Prior transfusion of packed red blood cells noted. On pantoprazole. On erythropoietin.    CASTAÑEDA cirrhosis with portal gastropathy - On lactulose. Ammonia level was not elevated.    Diabetes - Insulin coverage, close monitoring of blood glucose levels.

## 2020-02-02 NOTE — PROGRESS NOTE ADULT - SUBJECTIVE AND OBJECTIVE BOX
[facility] DIVISION OF KIDNEY DISEASES AND HYPERTENSION -- HEMODIALYSIS NOTE  --------------------------------------------------------------------------------  Chief Complaint: ESRD/Ongoing hemodialysis requirement    24 hour events/subjective:  s/p HD yesterday with 1 L UF; tolerated well    PAST HISTORY  --------------------------------------------------------------------------------  No significant changes to PMH, PSH, FHx, SHx, unless otherwise noted    ALLERGIES & MEDICATIONS  --------------------------------------------------------------------------------  Allergies    ACE inhibitors (Other (Mild))    Intolerances    oxycodone (Sedation/Somnol)    Standing Inpatient Medications  aspirin  chewable 81 milliGRAM(s) Oral daily  chlorhexidine 4% Liquid 1 Application(s) Topical <User Schedule>  collagenase Ointment 1 Application(s) Topical two times a day  Dakins Solution - 1/2 Strength 1 Application(s) Topical daily  dextrose 5%. 1000 milliLiter(s) IV Continuous <Continuous>  dextrose 50% Injectable 12.5 Gram(s) IV Push once  dextrose 50% Injectable 25 Gram(s) IV Push once  dextrose 50% Injectable 25 Gram(s) IV Push once  epoetin sara Injectable 70011 Unit(s) IV Push <User Schedule>  heparin  Injectable 5000 Unit(s) SubCutaneous every 8 hours  insulin lispro (HumaLOG) corrective regimen sliding scale   SubCutaneous every 6 hours  lactulose Syrup 10 Gram(s) Oral two times a day  pantoprazole  Injectable 40 milliGRAM(s) IV Push two times a day    PRN Inpatient Medications  acetaminophen    Suspension .. 650 milliGRAM(s) Enteral Tube every 6 hours PRN  albuterol/ipratropium for Nebulization 3 milliLiter(s) Nebulizer every 6 hours PRN  dextrose 40% Gel 15 Gram(s) Oral once PRN  glucagon  Injectable 1 milliGRAM(s) IntraMuscular once PRN  morphine  - Injectable 2 milliGRAM(s) IV Push every 4 hours PRN  sodium chloride 0.9% lock flush 10 milliLiter(s) IV Push every 1 hour PRN      REVIEW OF SYSTEMS  --------------------------------------------------------------------------------  pt resting- unable to obtain ROS      VITALS/PHYSICAL EXAM  --------------------------------------------------------------------------------  T(C): 36.8 (02-02-20 @ 07:30), Max: 36.8 (02-01-20 @ 14:24)  HR: 93 (02-02-20 @ 07:30) (88 - 97)  BP: 119/69 (02-02-20 @ 07:30) (108/65 - 138/56)  RR: 20 (02-02-20 @ 07:30) (18 - 20)  SpO2: 100% (02-02-20 @ 07:30) (100% - 100%)  Wt(kg): --        02-01-20 @ 07:01  -  02-02-20 @ 07:00  --------------------------------------------------------  IN: 60 mL / OUT: 1000 mL / NET: -940 mL      Physical Exam:  	Gen: NAD  	HEENT: on NC  	Pulm: CTA B/L  	CV: RRR, S1S2; no rub  	Abd: +BS, soft, nontender  	UE: Warm,  	LE: Warm, edema  	Neuro: No focal deficits  	Skin: Warm, without rashes  	Vascular access: RIJ tunneled HD catheter    LABS/STUDIES  --------------------------------------------------------------------------------              9.0    6.87  >-----------<  384      [02-01-20 @ 15:14]              27.9     131  |  95  |  48.0  ----------------------------<  193      [02-01-20 @ 15:14]  4.0   |  27.0  |  2.14        Ca     8.2     [02-01-20 @ 15:14]            Iron 25, TIBC 154, %sat 16      [01-02-20 @ 09:39]  Ferritin 668      [01-02-20 @ 09:39]  PTH -- (Ca 8.0)      [01-02-20 @ 17:06]   119  PTH -- (Ca 9.1)      [05-30-19 @ 18:09]   107  Vitamin D (25OH) 25.2      [01-02-20 @ 17:06]  HbA1c 5.8      [12-26-19 @ 09:04]  TSH 1.10      [05-17-19 @ 12:21]

## 2020-02-02 NOTE — PROGRESS NOTE ADULT - SUBJECTIVE AND OBJECTIVE BOX
INTERVAL HPI/OVERNIGHT EVENTS: none    SUBJECTIVE:  Patient evaluated at bedside and found in no acute distress. Continued daily dressing changes with santyl. Remains hemodynamically stable and denies fevers, chills. No CP or SOB Primary team planning for discharge at this time      MEDICATIONS  (STANDING):  aspirin  chewable 81 milliGRAM(s) Oral daily  chlorhexidine 4% Liquid 1 Application(s) Topical <User Schedule>  collagenase Ointment 1 Application(s) Topical two times a day  Dakins Solution - 1/2 Strength 1 Application(s) Topical daily  dextrose 5%. 1000 milliLiter(s) (50 mL/Hr) IV Continuous <Continuous>  dextrose 50% Injectable 12.5 Gram(s) IV Push once  dextrose 50% Injectable 25 Gram(s) IV Push once  dextrose 50% Injectable 25 Gram(s) IV Push once  epoetin sara Injectable 51744 Unit(s) IV Push <User Schedule>  heparin  Injectable 5000 Unit(s) SubCutaneous every 8 hours  insulin lispro (HumaLOG) corrective regimen sliding scale   SubCutaneous every 6 hours  lactulose Syrup 10 Gram(s) Oral two times a day  pantoprazole  Injectable 40 milliGRAM(s) IV Push two times a day    MEDICATIONS  (PRN):  acetaminophen    Suspension .. 650 milliGRAM(s) Enteral Tube every 6 hours PRN Temp greater or equal to 38C (100.4F), Mild Pain (1 - 3), Moderate Pain (4 - 6)  albuterol/ipratropium for Nebulization 3 milliLiter(s) Nebulizer every 6 hours PRN Shortness of Breath and/or Wheezing  dextrose 40% Gel 15 Gram(s) Oral once PRN Blood Glucose LESS THAN 70 milliGRAM(s)/deciLiter  glucagon  Injectable 1 milliGRAM(s) IntraMuscular once PRN Glucose <70 milliGRAM(s)/deciLiter  morphine  - Injectable 2 milliGRAM(s) IV Push every 4 hours PRN Severe Pain (7 - 10)  sodium chloride 0.9% lock flush 10 milliLiter(s) IV Push every 1 hour PRN Pre/post blood products, medications, blood draw, and to maintain line patency      Vital Signs Last 24 Hrs  T(C): 36.8 (02 Feb 2020 00:09), Max: 36.8 (01 Feb 2020 07:37)  T(F): 98.3 (02 Feb 2020 00:09), Max: 98.3 (02 Feb 2020 00:09)  HR: 97 (02 Feb 2020 00:09) (84 - 97)  BP: 108/65 (02 Feb 2020 00:09) (108/65 - 138/56)  BP(mean): --  RR: 18 (02 Feb 2020 00:09) (18 - 19)  SpO2: 100% (01 Feb 2020 18:25) (99% - 100%)    PE  Gen: resting comfortably in bed, no acute distress  Pulm: no increased wob, no conversational dyspnea  Abd: non-distended, soft, non-tender, non-peritoneal  Ext: no peripheral edema, distal extremities warm and well perfused  Wound: Large sacral ulcer with some areas of fibrinous exudate in the areas surrounding central region of wound, No purulent drainage or surrounding cellulitis to skin edges. Depth beyond subcutaneous fat, infiltrating muscle/bone. 8x11.5x1.5 cm ulceration        I&O's Detail    31 Jan 2020 07:01  -  01 Feb 2020 07:00  --------------------------------------------------------  IN:    Enteral Tube Flush: 75 mL    Nepro: 640 mL  Total IN: 715 mL    OUT:  Total OUT: 0 mL    Total NET: 715 mL      01 Feb 2020 07:01  -  02 Feb 2020 00:26  --------------------------------------------------------  IN:    Nepro: 60 mL  Total IN: 60 mL    OUT:    Other: 1000 mL  Total OUT: 1000 mL    Total NET: -940 mL          LABS:                        9.0    6.87  )-----------( 384      ( 01 Feb 2020 15:14 )             27.9     02-01    131<L>  |  95<L>  |  48.0<H>  ----------------------------<  193<H>  4.0   |  27.0  |  2.14<H>    Ca    8.2<L>      01 Feb 2020 15:14

## 2020-02-02 NOTE — PROGRESS NOTE ADULT - ASSESSMENT
1) KEITH on CKD- now deemed ESRD  2) encephalopathy- improved  4) Acute blood loss anemia superimposed on anemia of CKD  5) HTN- BP stable    HD TTS schedule  Continue QUENTIN , PRBC transfusion prn with HD  Monitor lytes, BP and UOP    Will follow

## 2020-02-03 VITALS
SYSTOLIC BLOOD PRESSURE: 119 MMHG | OXYGEN SATURATION: 95 % | HEART RATE: 104 BPM | RESPIRATION RATE: 18 BRPM | DIASTOLIC BLOOD PRESSURE: 55 MMHG | TEMPERATURE: 98 F

## 2020-02-03 LAB
GLUCOSE BLDC GLUCOMTR-MCNC: 132 MG/DL — HIGH (ref 70–99)
GLUCOSE BLDC GLUCOMTR-MCNC: 158 MG/DL — HIGH (ref 70–99)
GLUCOSE BLDC GLUCOMTR-MCNC: 184 MG/DL — HIGH (ref 70–99)

## 2020-02-03 PROCEDURE — 83735 ASSAY OF MAGNESIUM: CPT

## 2020-02-03 PROCEDURE — P9016: CPT

## 2020-02-03 PROCEDURE — C1894: CPT

## 2020-02-03 PROCEDURE — 36558 INSERT TUNNELED CV CATH: CPT

## 2020-02-03 PROCEDURE — 83880 ASSAY OF NATRIURETIC PEPTIDE: CPT

## 2020-02-03 PROCEDURE — 83036 HEMOGLOBIN GLYCOSYLATED A1C: CPT

## 2020-02-03 PROCEDURE — L8699: CPT

## 2020-02-03 PROCEDURE — 82947 ASSAY GLUCOSE BLOOD QUANT: CPT

## 2020-02-03 PROCEDURE — 71250 CT THORAX DX C-: CPT

## 2020-02-03 PROCEDURE — 99231 SBSQ HOSP IP/OBS SF/LOW 25: CPT

## 2020-02-03 PROCEDURE — 80069 RENAL FUNCTION PANEL: CPT

## 2020-02-03 PROCEDURE — 82140 ASSAY OF AMMONIA: CPT

## 2020-02-03 PROCEDURE — 84100 ASSAY OF PHOSPHORUS: CPT

## 2020-02-03 PROCEDURE — 82803 BLOOD GASES ANY COMBINATION: CPT

## 2020-02-03 PROCEDURE — C1751: CPT

## 2020-02-03 PROCEDURE — 83540 ASSAY OF IRON: CPT

## 2020-02-03 PROCEDURE — 70450 CT HEAD/BRAIN W/O DYE: CPT

## 2020-02-03 PROCEDURE — 95951: CPT

## 2020-02-03 PROCEDURE — 36555 INSERT NON-TUNNEL CV CATH: CPT

## 2020-02-03 PROCEDURE — 87640 STAPH A DNA AMP PROBE: CPT

## 2020-02-03 PROCEDURE — 87340 HEPATITIS B SURFACE AG IA: CPT

## 2020-02-03 PROCEDURE — 94640 AIRWAY INHALATION TREATMENT: CPT

## 2020-02-03 PROCEDURE — C1750: CPT

## 2020-02-03 PROCEDURE — 99261: CPT

## 2020-02-03 PROCEDURE — 86803 HEPATITIS C AB TEST: CPT

## 2020-02-03 PROCEDURE — 96374 THER/PROPH/DIAG INJ IV PUSH: CPT | Mod: XU

## 2020-02-03 PROCEDURE — G0365: CPT

## 2020-02-03 PROCEDURE — 94760 N-INVAS EAR/PLS OXIMETRY 1: CPT

## 2020-02-03 PROCEDURE — 82435 ASSAY OF BLOOD CHLORIDE: CPT

## 2020-02-03 PROCEDURE — 82550 ASSAY OF CK (CPK): CPT

## 2020-02-03 PROCEDURE — 85610 PROTHROMBIN TIME: CPT

## 2020-02-03 PROCEDURE — 90937 HEMODIALYSIS REPEATED EVAL: CPT

## 2020-02-03 PROCEDURE — 71045 X-RAY EXAM CHEST 1 VIEW: CPT

## 2020-02-03 PROCEDURE — 83605 ASSAY OF LACTIC ACID: CPT

## 2020-02-03 PROCEDURE — 92610 EVALUATE SWALLOWING FUNCTION: CPT

## 2020-02-03 PROCEDURE — 36430 TRANSFUSION BLD/BLD COMPNT: CPT

## 2020-02-03 PROCEDURE — 99152 MOD SED SAME PHYS/QHP 5/>YRS: CPT

## 2020-02-03 PROCEDURE — 96376 TX/PRO/DX INJ SAME DRUG ADON: CPT | Mod: XU

## 2020-02-03 PROCEDURE — 83550 IRON BINDING TEST: CPT

## 2020-02-03 PROCEDURE — 82310 ASSAY OF CALCIUM: CPT

## 2020-02-03 PROCEDURE — 85014 HEMATOCRIT: CPT

## 2020-02-03 PROCEDURE — 99292 CRITICAL CARE ADDL 30 MIN: CPT | Mod: 25

## 2020-02-03 PROCEDURE — 92611 MOTION FLUOROSCOPY/SWALLOW: CPT

## 2020-02-03 PROCEDURE — 86850 RBC ANTIBODY SCREEN: CPT

## 2020-02-03 PROCEDURE — 86900 BLOOD TYPING SEROLOGIC ABO: CPT

## 2020-02-03 PROCEDURE — 84466 ASSAY OF TRANSFERRIN: CPT

## 2020-02-03 PROCEDURE — 83970 ASSAY OF PARATHORMONE: CPT

## 2020-02-03 PROCEDURE — 85730 THROMBOPLASTIN TIME PARTIAL: CPT

## 2020-02-03 PROCEDURE — 96375 TX/PRO/DX INJ NEW DRUG ADDON: CPT | Mod: XU

## 2020-02-03 PROCEDURE — 82962 GLUCOSE BLOOD TEST: CPT

## 2020-02-03 PROCEDURE — 86704 HEP B CORE ANTIBODY TOTAL: CPT

## 2020-02-03 PROCEDURE — 84484 ASSAY OF TROPONIN QUANT: CPT

## 2020-02-03 PROCEDURE — 74176 CT ABD & PELVIS W/O CONTRAST: CPT

## 2020-02-03 PROCEDURE — 36415 COLL VENOUS BLD VENIPUNCTURE: CPT

## 2020-02-03 PROCEDURE — 86706 HEP B SURFACE ANTIBODY: CPT

## 2020-02-03 PROCEDURE — 84295 ASSAY OF SERUM SODIUM: CPT

## 2020-02-03 PROCEDURE — 81001 URINALYSIS AUTO W/SCOPE: CPT

## 2020-02-03 PROCEDURE — 84132 ASSAY OF SERUM POTASSIUM: CPT

## 2020-02-03 PROCEDURE — 80053 COMPREHEN METABOLIC PANEL: CPT

## 2020-02-03 PROCEDURE — 94660 CPAP INITIATION&MGMT: CPT

## 2020-02-03 PROCEDURE — 82728 ASSAY OF FERRITIN: CPT

## 2020-02-03 PROCEDURE — 87449 NOS EACH ORGANISM AG IA: CPT

## 2020-02-03 PROCEDURE — 85027 COMPLETE CBC AUTOMATED: CPT

## 2020-02-03 PROCEDURE — 99291 CRITICAL CARE FIRST HOUR: CPT | Mod: 25

## 2020-02-03 PROCEDURE — 80048 BASIC METABOLIC PNL TOTAL CA: CPT

## 2020-02-03 PROCEDURE — 76937 US GUIDE VASCULAR ACCESS: CPT

## 2020-02-03 PROCEDURE — 97163 PT EVAL HIGH COMPLEX 45 MIN: CPT

## 2020-02-03 PROCEDURE — 99239 HOSP IP/OBS DSCHRG MGMT >30: CPT

## 2020-02-03 PROCEDURE — 87040 BLOOD CULTURE FOR BACTERIA: CPT

## 2020-02-03 PROCEDURE — 76775 US EXAM ABDO BACK WALL LIM: CPT

## 2020-02-03 PROCEDURE — 92526 ORAL FUNCTION THERAPY: CPT

## 2020-02-03 PROCEDURE — 82330 ASSAY OF CALCIUM: CPT

## 2020-02-03 PROCEDURE — 86901 BLOOD TYPING SEROLOGIC RH(D): CPT

## 2020-02-03 PROCEDURE — 97530 THERAPEUTIC ACTIVITIES: CPT

## 2020-02-03 PROCEDURE — 86923 COMPATIBILITY TEST ELECTRIC: CPT

## 2020-02-03 PROCEDURE — 74230 X-RAY XM SWLNG FUNCJ C+: CPT

## 2020-02-03 PROCEDURE — 82306 VITAMIN D 25 HYDROXY: CPT

## 2020-02-03 PROCEDURE — 87641 MR-STAPH DNA AMP PROBE: CPT

## 2020-02-03 PROCEDURE — 87086 URINE CULTURE/COLONY COUNT: CPT

## 2020-02-03 PROCEDURE — 86922 COMPATIBILITY TEST ANTIGLOB: CPT

## 2020-02-03 PROCEDURE — 93005 ELECTROCARDIOGRAM TRACING: CPT

## 2020-02-03 PROCEDURE — 82553 CREATINE MB FRACTION: CPT

## 2020-02-03 RX ADMIN — MORPHINE SULFATE 2 MILLIGRAM(S): 50 CAPSULE, EXTENDED RELEASE ORAL at 05:52

## 2020-02-03 RX ADMIN — PANTOPRAZOLE SODIUM 40 MILLIGRAM(S): 20 TABLET, DELAYED RELEASE ORAL at 06:03

## 2020-02-03 RX ADMIN — MORPHINE SULFATE 2 MILLIGRAM(S): 50 CAPSULE, EXTENDED RELEASE ORAL at 11:39

## 2020-02-03 RX ADMIN — Medication 2: at 11:06

## 2020-02-03 RX ADMIN — Medication 2: at 06:04

## 2020-02-03 RX ADMIN — LACTULOSE 10 GRAM(S): 10 SOLUTION ORAL at 17:48

## 2020-02-03 RX ADMIN — Medication 81 MILLIGRAM(S): at 11:07

## 2020-02-03 RX ADMIN — Medication 1 APPLICATION(S): at 11:07

## 2020-02-03 RX ADMIN — MORPHINE SULFATE 2 MILLIGRAM(S): 50 CAPSULE, EXTENDED RELEASE ORAL at 11:24

## 2020-02-03 RX ADMIN — Medication 1 APPLICATION(S): at 17:49

## 2020-02-03 RX ADMIN — HEPARIN SODIUM 5000 UNIT(S): 5000 INJECTION INTRAVENOUS; SUBCUTANEOUS at 06:04

## 2020-02-03 RX ADMIN — PANTOPRAZOLE SODIUM 40 MILLIGRAM(S): 20 TABLET, DELAYED RELEASE ORAL at 17:48

## 2020-02-03 RX ADMIN — Medication 1 APPLICATION(S): at 06:03

## 2020-02-03 NOTE — PROGRESS NOTE ADULT - ASSESSMENT
----------------------------------------  65 yo female with hx of CHFpEF, CAD, CKD, HTN, DM2, cirrhosis, portal gastropathy, recurrent GI bleed, blind, sacral decub ulcer, presented to the ED from nursing home with encephalopathy, hypoxic resp failure requiring bipap, in ED found to have anemia with Hb 5 that responded to blood transfusions (hx of recurrent GI bleed for which multiple EGDs/ colonoscopies have been performed, no further invasive work up as per GI). Also noted to have sepsis on admission that was secondary to PNA, possibly gram negative bacteria. Patient completed a course of zosyn. Also with KEITH on CKD now requiring HD via left IJ. Right chest tunnelled cath placed for access on 1/3/20. Patient failed speech and swallow and is NPO. NGT feeding was started and family to make now opting for PEG. Had upper ext mapping US showed left brachial veins DVT around the catheter and superficial right basilic and left cephalic vein thrombosis which vascular recommends full anticoagulation if not contraindicated due to GI bleed on admission; discussed with GI to hold on full dose anticoagulation for now.    Sacral decubitus ulcer - Previous debridements noted. Pressure relieving bed in use. Analgesic medications as needed. On collagenase.    Dysphagia - Repeat modified barium swallow completed which noted moderate dysphagia and recommendations for a trial of puree diet with honey thick consistency fluids. Aspiration precautions to be continued. Gastrostomy tube feeds to continue until the patient's ability to tolerate consistent oral intake is determined.    Pneumonia - Suspect secondary to aspiration. Prior courses of antibiotics completed. Afebrile  and without leukocytosis.    Bilateral upper extremity superficial vein thrombosis and left deep venous thrombosis - Poor candidate for anticoagulation due to anemia and history of recurrent gastrointestinal hemorrhage.    ESRD - Currently on hemodialysis.     Acute on chronic diastolic heart failure - Continue with hemodialysis , UF  as scheduled. Supplemental oxygen as needed for hypoxia.    Acute blood loss anemia - Prior transfusion of packed red blood cells noted. On pantoprazole. On erythropoietin.    CASTAÑEDA cirrhosis with portal gastropathy - On lactulose. Ammonia level was not elevated.    Diabetes - Insulin coverage, close monitoring of blood glucose levels. Avoid Hypoglycemia,

## 2020-02-03 NOTE — PROGRESS NOTE ADULT - SUBJECTIVE AND OBJECTIVE BOX
Vital Signs Last 24 Hrs,    T(C): 36.7 (03 Feb 2020 07:40), Max: 36.9 (03 Feb 2020 00:00)  T(F): 98.1 (03 Feb 2020 07:40), Max: 98.5 (03 Feb 2020 00:00)  HR: 76 (03 Feb 2020 07:40) (76 - 95)  BP: 101/60 (03 Feb 2020 07:40) (101/60 - 110/64)  RR: 18 (03 Feb 2020 07:40) (18 - 18)  SpO2: 96% (03 Feb 2020 07:40) (96% - 98%)    Chief Complaint: ESRD/Ongoing hemodialysis requirement    24 hour events/subjective:  s/p HD On Saturday ,  with 1 L UF ; tolerated well    PAST HISTORY  --------------------------------------------------------------------------------  No significant changes to PMH, PSH, FHx, SHx, unless otherwise noted    ALLERGIES & MEDICATIONS  --------------------------------------------------------------------------------  Allergies    ACE inhibitors (Other (Mild))    Intolerances    oxycodone (Sedation/Somnol)    Standing Inpatient Medications  aspirin  chewable 81 milliGRAM(s) Oral daily  chlorhexidine 4% Liquid 1 Application(s) Topical <User Schedule>  collagenase Ointment 1 Application(s) Topical two times a day  Dakins Solution - 1/2 Strength 1 Application(s) Topical daily  dextrose 5%. 1000 milliLiter(s) IV Continuous <Continuous>  dextrose 50% Injectable 12.5 Gram(s) IV Push once  dextrose 50% Injectable 25 Gram(s) IV Push once  dextrose 50% Injectable 25 Gram(s) IV Push once  epoetin sara Injectable 02245 Unit(s) IV Push <User Schedule>  heparin  Injectable 5000 Unit(s) SubCutaneous every 8 hours  insulin lispro (HumaLOG) corrective regimen sliding scale   SubCutaneous every 6 hours  lactulose Syrup 10 Gram(s) Oral two times a day  pantoprazole  Injectable 40 milliGRAM(s) IV Push two times a day    PRN Inpatient Medications  acetaminophen    Suspension .. 650 milliGRAM(s) Enteral Tube every 6 hours PRN  albuterol/ipratropium for Nebulization 3 milliLiter(s) Nebulizer every 6 hours PRN  dextrose 40% Gel 15 Gram(s) Oral once PRN  glucagon  Injectable 1 milliGRAM(s) IntraMuscular once PRN  morphine  - Injectable 2 milliGRAM(s) IV Push every 4 hours PRN  sodium chloride 0.9% lock flush 10 milliLiter(s) IV Push every 1 hour PRN    REVIEW OF SYSTEMS  --------------------------------------------------------------------------------  pt resting- unable to obtain ROS    VITALS/PHYSICAL EXAM  --------------------------------------------------------------------------------  T(C): 36.8 (02-02-20 @ 07:30), Max: 36.8 (02-01-20 @ 14:24)  HR: 93 (02-02-20 @ 07:30) (88 - 97)  BP: 119/69 (02-02-20 @ 07:30) (108/65 - 138/56)  RR: 20 (02-02-20 @ 07:30) (18 - 20)  SpO2: 100% (02-02-20 @ 07:30) (100% - 100%    02-01-20 @ 07:01  -  02-02-20 @ 07:00  --------------------------------------------------------  IN: 60 mL / OUT: 1000 mL / NET: -940 mL    Physical Exam:  	Gen: NAD, Pale,   	HEENT: on NC  	Pulm: CTA B/L  	CV: RRR, S1S2; no rub  	Abd: +BS, soft, nontender  	UE: Warm,  	LE: Warm, edema  	Neuro: No focal deficits  	Skin: Warm, without rashes , Large Sacral ulcer,   	Vascular access: RIJ tunneled HD catheter    LABS/STUDIES  --------------------------------------------------------------------------------              9.0    6.87  >-----------<  384      [02-01-20 @ 15:14]              27.9     131  |  95  |  48.0  ----------------------------<  193      [02-01-20 @ 15:14]  4.0   |  27.0  |  2.14        Ca     8.2     [02-01-20 @ 15:14]    Iron 25, TIBC 154, %sat 16      [01-02-20 @ 09:39]  Ferritin 668      [01-02-20 @ 09:39]  PTH -- (Ca 8.0)      [01-02-20 @ 17:06]   119  PTH -- (Ca 9.1)      [05-30-19 @ 18:09]   107  Vitamin D (25OH) 25.2      [01-02-20 @ 17:06]  HbA1c 5.8      [12-26-19 @ 09:04]  TSH 1.10      [05-17-19 @ 12:21]    1) KEITH on CKD- now deemed ESRD  2) encephalopathy- improved  4) Acute blood loss anemia superimposed on anemia of CKD  5) HTN- BP stable    HD TTS schedule  Continue QUENTIN , PRBC transfusion prn with HD,    HD In AM, overall stable,    Continue  current medical Management,

## 2020-02-03 NOTE — PROGRESS NOTE ADULT - SUBJECTIVE AND OBJECTIVE BOX
KUN KATETYRELL  ----------------------------------------  The patient was seen and evaluated for decubitus ulcer. Offers no complaints.    Vital Signs Last 24 Hrs  T(C): 36.7 (03 Feb 2020 07:40), Max: 36.9 (03 Feb 2020 00:00)  T(F): 98.1 (03 Feb 2020 07:40), Max: 98.5 (03 Feb 2020 00:00)  HR: 76 (03 Feb 2020 07:40) (76 - 95)  BP: 101/60 (03 Feb 2020 07:40) (101/60 - 110/64)  BP(mean): --  RR: 18 (03 Feb 2020 07:40) (18 - 18)  SpO2: 96% (03 Feb 2020 07:40) (96% - 98%)    CAPILLARY BLOOD GLUCOSE  POCT Blood Glucose.: 158 mg/dL (03 Feb 2020 06:00)  POCT Blood Glucose.: 252 mg/dL (02 Feb 2020 23:02)  POCT Blood Glucose.: 113 mg/dL (02 Feb 2020 16:11)  POCT Blood Glucose.: 218 mg/dL (02 Feb 2020 11:15)    PHYSICAL EXAMINATION:  ----------------------------------------  General appearance: No acute distress, Awake, Alert  HEENT: Normocephalic, Atraumatic, Conjunctiva clear, EOMI, Right cornea cloudy  Neck: Supple, No JVD, No tenderness  Lungs: Breath sound equal bilaterally, No wheezes, No rales  Cardiovascular: S1S2, Regular rhythm, Right chest wall catheter in place  Abdomen: Soft, Nontender, Nondistended, No guarding/rebound, Positive bowel sounds, Gastrostomy tube in place  Extremities: No clubbing, No cyanosis, No edema, No calf tenderness, Right toe amputations  Neuro: Strength equal bilaterally, No tremors  Psychiatric: Appropriate mood, Normal affect    LABORATORY STUDIES:  ----------------------------------------             9.0    6.87  )-----------( 384      ( 01 Feb 2020 15:14 )             27.9     02-01    131<L>  |  95<L>  |  48.0<H>  ----------------------------<  193<H>  4.0   |  27.0  |  2.14<H>    Ca    8.2<L>      01 Feb 2020 15:14    MEDICATIONS  (STANDING):  aspirin  chewable 81 milliGRAM(s) Oral daily  chlorhexidine 4% Liquid 1 Application(s) Topical <User Schedule>  collagenase Ointment 1 Application(s) Topical two times a day  Dakins Solution - 1/2 Strength 1 Application(s) Topical daily  dextrose 5%. 1000 milliLiter(s) (50 mL/Hr) IV Continuous <Continuous>  dextrose 50% Injectable 12.5 Gram(s) IV Push once  dextrose 50% Injectable 25 Gram(s) IV Push once  dextrose 50% Injectable 25 Gram(s) IV Push once  epoetin sara Injectable 27092 Unit(s) IV Push <User Schedule>  heparin  Injectable 5000 Unit(s) SubCutaneous every 8 hours  insulin lispro (HumaLOG) corrective regimen sliding scale   SubCutaneous every 6 hours  lactulose Syrup 10 Gram(s) Oral two times a day  pantoprazole  Injectable 40 milliGRAM(s) IV Push two times a day    MEDICATIONS  (PRN):  acetaminophen    Suspension .. 650 milliGRAM(s) Enteral Tube every 6 hours PRN Temp greater or equal to 38C (100.4F), Mild Pain (1 - 3), Moderate Pain (4 - 6)  albuterol/ipratropium for Nebulization 3 milliLiter(s) Nebulizer every 6 hours PRN Shortness of Breath and/or Wheezing  dextrose 40% Gel 15 Gram(s) Oral once PRN Blood Glucose LESS THAN 70 milliGRAM(s)/deciLiter  glucagon  Injectable 1 milliGRAM(s) IntraMuscular once PRN Glucose <70 milliGRAM(s)/deciLiter  morphine  - Injectable 2 milliGRAM(s) IV Push every 4 hours PRN Severe Pain (7 - 10)  sodium chloride 0.9% lock flush 10 milliLiter(s) IV Push every 1 hour PRN Pre/post blood products, medications, blood draw, and to maintain line patency      ASSESSMENT / PLAN:  ----------------------------------------  63 yo female with hx of CHFpEF, CAD, CKD, HTN, DM2, cirrhosis, portal gastropathy, recurrent GI bleed, blind, sacral decub ulcer, presented to the ED from nursing home with encephalopathy, hypoxic resp failure requiring bipap, in ED found to have anemia with Hb 5 that responded to blood transfusions (hx of recurrent GI bleed for which multiple EGDs/colonoscopies have been performed, no further invasive work up as per GI). Also noted to have sepsis on admission that was secondary to PNA, possibly gram negative bacteria. Patient completed a course of zosyn. Also with KEITH on CKD now requiring HD via left IJ. Right chest tunnelled cath placed for access on 1/3/20. Patient failed speech and swallow and is NPO. NGT feeding was started and family to make now opting for PEG. Had upper ext mapping US showed left brachial veins DVT around the catheter and superficial right basilic and left cephalic vein thrombosis which vascular recommends full anticoagulation if not contraindicated due to GI bleed on admission; discussed with GI to hold on full dose anticoagulation for now.    Sacral decubitus ulcer - Previous debridements noted. Pressure relieving bed in use. Analgesic medications as needed. Surgery follow up noted. To continue with collagenase.    Dysphagia - Repeat modified barium swallow completed which noted moderate dysphagia and recommendations for trial of puree diet with honey thick consistency fluids. Aspiration precautions to be continued. Gastrostomy tube feeds to continue until the patient's ability to tolerate consistent oral intake is determined.    Pneumonia - Suspect secondary to aspiration. Prior courses of antibiotics completed. Afebrile today and without leukocytosis.    Bilateral upper extremity superficial vein thrombosis and left deep venous thrombosis - Poor candidate for anticoagulation due to anemia and history of recurrent gastrointestinal hemorrhage.    Chronic kidney disease with acute kidney injury - Currently on hemodialysis. To follow up with Nephrology.    Acute on chronic diastolic heart failure - Continue with hemodialysis as scheduled. Supplemental oxygen as needed for hypoxia.    Acute blood loss anemia - Prior transfusion of packed red blood cells noted. On pantoprazole. On erythropoietin.    CASTAÑEDA cirrhosis with portal gastropathy - On lactulose. Ammonia level was not elevated.    Diabetes - Insulin coverage, close monitoring of blood glucose levels.

## 2020-02-03 NOTE — PROGRESS NOTE ADULT - REASON FOR ADMISSION
Acute hypoxemic respiratory failure, GIB

## 2020-02-03 NOTE — PROGRESS NOTE ADULT - ASSESSMENT
65 yo female with hx of CHFpEF, CAD, CKD, HTN, DM2, cirrhosis, portal gastropathy, recurrent GI bleed, blind, sacral decub ulcer, presented to the ED from nursing home with encephalopathy, hypoxic resp failure requiring bipap, in ED found to have anemia with Hb 5 that responded to blood transfusions (hx of recurrent GI bleed for which multiple EGDs/colonoscopies have been performed, no further invasive work up as per GI). Also noted to have sepsis on admission that was secondary to PNA, possibly gram negative bacteria. Patient completed a course of zosyn. Also with KEITH on CKD now requiring HD via left IJ. Right chest tunnelled cath placed for access on 1/3/20. Patient failed speech and swallow and is NPO. NGT feeding was started and family to make now opting for PEG. Had upper ext mapping US showed left brachial veins DVT around the catheter and superficial right basilic and left cephalic vein thrombosis which vascular recommends full anticoagulation if not contraindicated due to GI bleed on admission; discussed with GI to hold on full dose anticoagulation for now.    Sacral decubitus ulcer - Previous debridements noted. Pressure relieving bed in use. Analgesic medications as needed. Surgery follow up noted. To continue with collagenase.    Dysphagia - Repeat modified barium swallow completed which noted moderate dysphagia and recommendations for trial of puree diet with honey thick consistency fluids. Aspiration precautions to be continued. Gastrostomy tube feeds to continue until the patient's ability to tolerate consistent oral intake is determined.    Pneumonia - Suspect secondary to aspiration. Prior courses of antibiotics completed. Afebrile today and without leukocytosis.    Bilateral upper extremity superficial vein thrombosis and left deep venous thrombosis - Poor candidate for anticoagulation due to anemia and history of recurrent gastrointestinal hemorrhage.    Chronic kidney disease with acute kidney injury - Currently on hemodialysis. To follow up with Nephrology.    Acute on chronic diastolic heart failure - Continue with hemodialysis as scheduled. Supplemental oxygen as needed for hypoxia.    Acute blood loss anemia - Prior transfusion of packed red blood cells noted. On pantoprazole. On erythropoietin.    CASTAÑEDA cirrhosis with portal gastropathy - On lactulose. Ammonia level was not elevated.    Diabetes - Insulin coverage, close monitoring of blood glucose levels.    Estimated discharge date 2/3/20.

## 2020-02-03 NOTE — PROGRESS NOTE ADULT - ATTENDING COMMENTS
Estimated discharge date 2/3/20
Estimated discharge date 2/3/20
Attending Attestation:  I personally saw and evaluated the patient and agree with the above assessment and plan  Patient back to baseline level of oxygen requirement, mentation the same  inquired with nephrology on repeat dialysis today  patient can be downgraded  PHYSICAL EXAM:  General: mild distress due to pain  Eyes: opacified right eye, left eye pupil reactive and EOMI  Neck: obese  Respiratory: bilateral rhonchi/rales  Cardiovascular: Regular rate and rhythm. S1 and S2 Normal; loud systolic murmur  Gastrointestinal: Soft distended; tender in the L quadrants.  Genitourinary: Left back CVA tenderness/flank pain  Extremities: Normal range of motion, No clubbing, cyanosis or edema; deep wound on the right buttock adjacent to sacrum atleast 2cm deep, pink tissue at the base with topical medications, no surrounding erythema or necrotic areas.  Neurological: Alert and oriented to person and place  Skin: Warm and dry. No acute rash  Psychiatric: Cooperative and appropriate; flat affect
Will proceed with tunneled dialysis catheter insertion this AM    Pawel removed at bedside in cath lab preop
if medically stable .  will plan for operative debridement of sacral decub tuesday.
Attending Attestation:  I personally saw and evaluated the patient and agree with the above assessment and plan  Patient back to baseline level of oxygen requirement, mentation the same  inquired with nephrology on repeat dialysis today  patient can be downgraded  PHYSICAL EXAM:  General: mild distress due to pain  Eyes: opacified right eye, left eye pupil reactive and EOMI  Neck: obese  Respiratory: bilateral rhonchi/rales  Cardiovascular: Regular rate and rhythm. S1 and S2 Normal; loud systolic murmur  Gastrointestinal: Soft distended; tender in the L quadrants.  Genitourinary: Left back CVA tenderness/flank pain  Extremities: Normal range of motion, No clubbing, cyanosis or edema; deep wound on the right buttock adjacent to sacrum atleast 2cm deep, pink tissue at the base with topical medications, no surrounding erythema or necrotic areas.  Neurological: Alert and oriented to person and place  Skin: Warm and dry. No acute rash  Psychiatric: Cooperative and appropriate; flat affect
OK to place VAC
avss  sacral wound clean dry  cont with current wound care, wound is able to wound vac.
avss  sacral wound has clean edeges, with granulation tissue no active infection noted. plan for vac
avss  sacral wound with a clean base , periosteal tissue exposed, no sx of infection  plan  may start wound vac or can cont with wet / dry dressing.
cont with current care  plan for debridement on friday.
plan for operative debridement today for sacral decubitus ulcer.
s/p debridement of sacral decubitus ulcer.  cont with current wound care.   surgery will cont to follow.
I have seen and examined.  wound need santyl, no vac yet,.
cont local wound care
patient with large sacral decubitus ulcer with necrotic tissue not tolerating bedside debridement. continue santyl for now. plan for OR.  meantime, patient needs to be turned frequently  recommend clinitron bed  recommend to optimize nutrition
Cont current wound care
Cont local wound care
I saw and examined the patient, and were physically present for the key portions of the Evaluation and Management service provided. I agree with the above history, physical, and plan which I have reviewed and edited where appropriate.  CC- new HD for KEITH/ ATN, morbid obesity, bed bound, sacral decub unstageable, hypoxic resp failure  P/E- obese, c/o decub pain, S1 S2 +, saturating well on NC O2 3 L,   S1 S2 +, CTA bre distant breath sounds, soft/ BS +/NT/ ND, gen edema with improvement in feet edema  A/P- atn on new hd- daily hd for anasarca to cont  weaned off to NC O2  decub debrided, planned further debridement by surgery  cont anitbx per ID  planning GoC with family
I saw and examined the patient, and were physically present for the key portions of the Evaluation and Management service provided. I agree with the above history, physical, and plan which I have reviewed and edited where appropriate.  CC- new HD for KEITH/ ATN, morbid obesity, bed bound, sacral decub unstageable, hypoxic resp failure  P/E- obese, c/o decub pain, S1 S2 +, saturating well on NC O2 3 L,   S1 S2 +, CTA bre distant breath sounds, soft/ BS +/NT/ ND, gen edema with improvement in feet edema  A/P- atn on new hd- daily hd for anasarca to cont  weaned off to NC O2  decub debrided x 2 bedside, planned further debridement by surgery  cont anitbx per ID  GoC with family- they refused OR debdridemnt as they don't want anesthesia. end of life care decisions nto made yet

## 2020-02-03 NOTE — PROGRESS NOTE ADULT - SUBJECTIVE AND OBJECTIVE BOX
INTERVAL HPI/OVERNIGHT EVENTS:    Patient awaiting transport to [facility], surgical team continues to evaluate the wound while inpatient. Bedside debridement with santyl with good results. possibiltiy that patinet can have wound vac as it continues to improve.       MEDICATIONS  (STANDING):  aspirin  chewable 81 milliGRAM(s) Oral daily  chlorhexidine 4% Liquid 1 Application(s) Topical <User Schedule>  collagenase Ointment 1 Application(s) Topical two times a day  Dakins Solution - 1/2 Strength 1 Application(s) Topical daily  dextrose 5%. 1000 milliLiter(s) (50 mL/Hr) IV Continuous <Continuous>  dextrose 50% Injectable 12.5 Gram(s) IV Push once  dextrose 50% Injectable 25 Gram(s) IV Push once  dextrose 50% Injectable 25 Gram(s) IV Push once  epoetin sara Injectable 47035 Unit(s) IV Push <User Schedule>  heparin  Injectable 5000 Unit(s) SubCutaneous every 8 hours  insulin lispro (HumaLOG) corrective regimen sliding scale   SubCutaneous every 6 hours  lactulose Syrup 10 Gram(s) Oral two times a day  pantoprazole  Injectable 40 milliGRAM(s) IV Push two times a day    MEDICATIONS  (PRN):  acetaminophen    Suspension .. 650 milliGRAM(s) Enteral Tube every 6 hours PRN Temp greater or equal to 38C (100.4F), Mild Pain (1 - 3), Moderate Pain (4 - 6)  albuterol/ipratropium for Nebulization 3 milliLiter(s) Nebulizer every 6 hours PRN Shortness of Breath and/or Wheezing  dextrose 40% Gel 15 Gram(s) Oral once PRN Blood Glucose LESS THAN 70 milliGRAM(s)/deciLiter  glucagon  Injectable 1 milliGRAM(s) IntraMuscular once PRN Glucose <70 milliGRAM(s)/deciLiter  morphine  - Injectable 2 milliGRAM(s) IV Push every 4 hours PRN Severe Pain (7 - 10)  sodium chloride 0.9% lock flush 10 milliLiter(s) IV Push every 1 hour PRN Pre/post blood products, medications, blood draw, and to maintain line patency      Vital Signs Last 24 Hrs  T(C): 36.9 (03 Feb 2020 00:00), Max: 36.9 (03 Feb 2020 00:00)  T(F): 98.5 (03 Feb 2020 00:00), Max: 98.5 (03 Feb 2020 00:00)  HR: 95 (03 Feb 2020 00:00) (93 - 95)  BP: 110/64 (03 Feb 2020 00:00) (110/64 - 119/69)  BP(mean): --  RR: 18 (03 Feb 2020 00:00) (18 - 20)  SpO2: 98% (03 Feb 2020 00:00) (98% - 100%)    Physical Exam:    Gen: resting comfortably in bed, no acute distress  Pulm: no increased wob, no conversational dyspnea  Abd: non-distended, soft, non-tender, non-peritoneal  Ext: no peripheral edema, distal extremities warm and well perfused  Wound: Large sacral ulcer with some areas of fibrinous exudate in the areas surrounding central region of wound, No purulent drainage or surrounding cellulitis to skin edges. Depth beyond subcutaneous fat, infiltrating muscle/bone. 8x11.5x1.5 cm ulceration        I&O's Detail    01 Feb 2020 07:01  -  02 Feb 2020 07:00  --------------------------------------------------------  IN:    Nepro: 60 mL  Total IN: 60 mL    OUT:    Other: 1000 mL  Total OUT: 1000 mL    Total NET: -940 mL      02 Feb 2020 07:01  -  03 Feb 2020 00:54  --------------------------------------------------------  IN:    Enteral Tube Flush: 150 mL    Nepro: 550 mL  Total IN: 700 mL    OUT:  Total OUT: 0 mL    Total NET: 700 mL          LABS:                        9.0    6.87  )-----------( 384      ( 01 Feb 2020 15:14 )             27.9     02-01    131<L>  |  95<L>  |  48.0<H>  ----------------------------<  193<H>  4.0   |  27.0  |  2.14<H>    Ca    8.2<L>      01 Feb 2020 15:14            RADIOLOGY & ADDITIONAL STUDIES:

## 2020-02-03 NOTE — PROGRESS NOTE ADULT - ASSESSMENT
63 yo F wit paraplegia presented from nursing home for Stage III-IV sacral decubitus ulcer being followed by surgery for wound care.    - Continue santyl debridement of the wound, BID dressing changes.   - Evaluating wound at this time for possibility of wound vac placement  - Offloading bed and boots recommended Patient should be on a [facility]ron bed, with offloading   - Pain control  - ok for discharge per primary team 65 yo F wit paraplegia presented from nursing home for Stage III-IV sacral decubitus ulcer being followed by surgery for wound care.    - Continue santyl debridement of the wound, BID dressing changes.   - Evaluating wound at this time for possibility of wound vac placement  - Offloading bed and boots recommended Patient should be on a [place]ron bed, with offloading   - Pain control  - ok for discharge per primary team. Patient does NOT f/u with ACS clinic upon discharge. 65 yo F wit paraplegia presented from nursing home for Stage III-IV sacral decubitus ulcer being followed by surgery for wound care.    - Continue santyl debridement of the wound, BID dressing changes.   - Evaluating wound at this time for possibility of wound vac placement  - Offloading bed and boots recommended Patient should be on a [facility]ron bed, with offloading   - Pain control  - ok for discharge per primary team. Patient does NOT f/u with ACS clinic upon discharge. Recommend wound clinic vs. Plastics f/u for continued wound care.

## 2020-02-03 NOTE — PROGRESS NOTE ADULT - SUBJECTIVE AND OBJECTIVE BOX
[facility] DIVISION OF KIDNEY DISEASES AND HYPERTENSION -- HEMODIALYSIS NOTE  --------------------------------------------------------------------------------  Chief Complaint: ESRD/Ongoing hemodialysis requirement    24 hour events/subjective: Awaits Discahrge after HD today,     PAST HISTORY  --------------------------------------------------------------------------------  No significant changes to PMH, PSH, FHx, SHx, unless otherwise noted    ALLERGIES & MEDICATIONS  --------------------------------------------------------------------------------  Allergies    ACE inhibitors (Other (Mild))    Intolerances    oxycodone (Sedation/Somnol)    Standing Inpatient Medications    PRN Inpatient Medications    REVIEW OF SYSTEMS  --------------------------------------------------------------------------------  Gen: No weight changes, fatigue, fevers/chills, weakness  Skin: No rashes  Head/Eyes/Ears/Mouth: No headache; Normal hearing; Normal vision w/o blurriness; No sinus pain/discomfort, sore throat  Respiratory: No dyspnea, cough, wheezing, hemoptysis  CV: No chest pain, PND, orthopnea  GI: No abdominal pain, diarrhea, constipation, nausea, vomiting, melena, hematochezia  : No increased frequency, dysuria, hematuria, nocturia  MSK: No joint pain/swelling; no back pain; no edema  Neuro: No dizziness/lightheadedness, weakness, seizures, numbness, tingling  Heme: No easy bruising or bleeding  Endo: No heat/cold intolerance  Psych: No significant nervousness, anxiety, stress, depression    All other systems were reviewed and are negative, except as noted.    VITALS/PHYSICAL EXAM  --------------------------------------------------------------------------------  T(C): 36.7 (02-03-20 @ 19:41), Max: 37.1 (02-03-20 @ 14:12)  HR: 104 (02-03-20 @ 19:41) (88 - 104)  BP: 119/55 (02-03-20 @ 19:41) (107/40 - 119/62)  RR: 18 (02-03-20 @ 19:41) (16 - 18)  SpO2: 95% (02-03-20 @ 19:41) (95% - 98%)    2-03-20 @ 07:01  -  02-04-20 @ 07:00  --------------------------------------------------------  IN: 800 mL / OUT: 1800 mL / NET: -1000 mL    Physical Exam:  	Gen: NAD, well-appearing  	HEENT: PERRL, supple neck,   	Pulm: CTA B/L  	CV: RRR, S1S2; no rub  	Back: No spinal or CVA tenderness; no sacral edema  	Abd: +BS, soft, nontender/nondistended, + PEG,   	: No suprapubic tenderness  	UE: Warm, FROM, no clubbing, intact strength; no edema; no asterixis  	LE: Warm, FROM, no clubbing, intact strength; no edema  	Neuro: No focal deficits,   	Psych: Normal affect and mood  	Skin: Warm, without rashes  	Vascular access: TDC,     LABS/STUDIES  --------------------------------------------------------------------------------  Iron 25, TIBC 154, %sat 16      [01-02-20 @ 09:39]  Ferritin 668      [01-02-20 @ 09:39]  PTH -- (Ca 8.0)      [01-02-20 @ 17:06]   119  PTH -- (Ca 9.1)      [05-30-19 @ 18:09]   107  Vitamin D (25OH) 25.2      [01-02-20 @ 17:06]  HbA1c 5.8      [12-26-19 @ 09:04]  TSH 1.10      [05-17-19 @ 12:21]

## 2020-02-03 NOTE — PROGRESS NOTE ADULT - NSHPATTENDINGPLANDISCUSS_GEN_ALL_CORE
medical team
Dr Dyer and Dr Tavarez
STEPHON Bush.
[place]
ACS team, all questions answered
Patient, ACs team, all questions answered
ACS team, all questions answered
ACS team, all questions answered

## 2020-03-24 NOTE — CHART NOTE - NSCHARTNOTEFT_GEN_A_CORE
ICU PA Note:    Brief Hx:  63F with PMHx CAD sp C1V, stents, DM, HTN, HLD, obesity, chronic hypercarbia, diastolic HF (nml EF) who was admitted for BOONE/abdominal girth.  Treated for HF with diuretics, dobutamine.  Underwent thoracentesis yesterday by IR.      Events of recent: Responded to RRT/code blue called by nursing after pt found lethargic and unresponsive after coming back from bathroom. Pt was taken off NIPPV a short time prior and then assisted with ambulation to bathroom by nursing.  On return to bed pt developed lethargy and ? unresponsive.  Pt was noted to have pulse and BP throughout whole episode. By time of arrival pt awake and alert being assisted on ambu by respiratory.  Placed back on NIPPV.     Hemodynamics stable, afebrile    ABG reveals CO2 61 with normal pH following placement on NIPPV for approximately 30mins  CXR with poor penetration, bilateral effusion R>L, no ptx  K 4.1/lactate 1.1  sats 96% on 40%  Neuro exam without any gross focal deficits suggestive of acute CVA (no pronator drift, finger to nose equal bilaterally, no facial asymmetry, strength 5/5 bilateral UE/LE, sensation grossly intact  12 EKG without acute ST changes with comparison to prior  tele rhythm strips reviewed, no evidence of acute arrhythmia, pt was noted to have come off monitor during middle of event.  Pt does not complain of CP, SOB, abdominal pain, nausea, back pain, dizziness at this time.     Imp:  likely an acute hypercarbic event     Plan:  1. would consider CTH without contrast given, ? LOC, although no focal findings at this time to r/o acute intracranial event  2. cont current medical therapy as per Cardio recs  3. cont NIPPV   4. titrating FiO2 to keep sats 90-94%, would avoid hyperoxia  5. check am labs    Discussed with senior medical resident ICU PA Note:    Brief Hx:  63F with PMHx CAD sp C1V, stents, DM, HTN, HLD, obesity, chronic hypercarbia, diastolic HF (nml EF) who was admitted for BOONE/abdominal girth.  Treated for HF with diuretics, dobutamine.  Underwent thoracentesis yesterday by IR.      Events of recent: Responded to RRT/code blue called by nursing after pt found lethargic and unresponsive after coming back from bathroom. Pt was taken off NIPPV a short time prior and then assisted with ambulation to bathroom by nursing.  On return to bed pt developed lethargy and ? unresponsive.  Pt was noted to have pulse and BP throughout whole episode. By time of arrival pt awake and alert being assisted on ambu by respiratory.  Placed back on NIPPV.     Hemodynamics stable, afebrile    ABG reveals CO2 61 with normal pH following placement on NIPPV for approximately 30mins  CXR with poor penetration, bilateral effusion R>L, no ptx  K 4.1/lactate 1.1  sats 96% on 40%  Neuro exam without any gross focal deficits suggestive of acute CVA (no pronator drift, finger to nose equal bilaterally, no facial asymmetry, strength 5/5 bilateral UE/LE, sensation grossly intact  12 EKG without acute ST changes with comparison to prior  tele rhythm strips reviewed, no evidence of acute arrhythmia, pt was noted to have come off monitor during middle of event.  Pt does not complain of CP, SOB, abdominal pain, nausea, back pain, dizziness at this time.     Imp:  likely an acute hypercarbic event causing very brief AMS    Plan:  1. would consider CTH without contrast given, ? LOC, although no focal findings at this time to r/o acute intracranial event  2. cont current medical therapy as per Cardio recs  3. cont NIPPV   4. titrating FiO2 to keep sats 90-94%, would avoid hyperoxia  5. check am labs    Discussed with senior medical resident ICU PA Note:    Brief Hx:  63F with PMHx CAD sp C1V, stents, DM, HTN, HLD, obesity, chronic hypercarbia, diastolic HF (nml EF) who was admitted for BOONE/abdominal girth.  Treated for HF with diuretics, dobutamine.  Underwent thoracentesis yesterday by IR.      Events of recent: Responded to RRT/code blue called by nursing after pt found lethargic and unresponsive after coming back from bathroom. Pt was taken off NIPPV a short time prior and then assisted with ambulation to bathroom by nursing.  On return to bed pt developed lethargy and ? unresponsive.  Pt was noted to have pulse and BP throughout whole episode. By time of arrival pt awake and alert being assisted on ambu by respiratory.  Placed back on NIPPV.     Hemodynamics stable, afebrile    ABG reveals CO2 61 with normal pH following placement on NIPPV for approximately 30mins  CXR with poor penetration, bilateral effusion R>L, no ptx  K 4.1/lactate 1.1  sats 96% on 40%  Neuro exam without any gross focal deficits suggestive of acute CVA (no pronator drift, finger to nose equal bilaterally, no facial asymmetry, strength 5/5 bilateral UE/LE, sensation grossly intact  12 EKG without acute ST changes with comparison to prior  tele rhythm strips reviewed, no evidence of acute arrhythmia, pt was noted to have come off monitor during middle of event.  Pt does not complain of CP, SOB, abdominal pain, nausea, back pain, dizziness at this time.     Imp:  likely an acute hypercarbia on chronic respiratory failure causing very brief AMS    Plan:  1. would consider CTH without contrast given, ? LOC, although no focal findings at this time to r/o acute intracranial event  2. cont current medical therapy as per Cardio recs  3. cont NIPPV   4. titrating FiO2 to keep sats 90-94%, would avoid hyperoxia  5. check am labs    Discussed with senior medical resident ICU PA Note:    Brief Hx:  63F with PMHx CAD sp C1V, stents, DM, HTN, HLD, obesity, chronic hypercarbia, diastolic HF (nml EF) who was admitted for BOONE/abdominal girth.  Treated for HF with diuretics, dobutamine.  Underwent thoracentesis yesterday by IR.      Events of recent: Responded to RRT/code blue called by nursing after pt found lethargic and unresponsive after coming back from bathroom. Pt was taken off NIPPV a short time prior and then assisted with ambulation to bathroom by nursing.  On return to bed pt developed lethargy and ? unresponsive.  Pt was noted to have pulse and BP throughout whole episode. By time of arrival pt awake and alert being assisted on ambu by respiratory.  Placed back on NIPPV.     Hemodynamics stable, afebrile    ABG reveals CO2 61 with normal pH following placement on NIPPV for approximately 30mins  CXR with poor penetration, bilateral effusion R>L, no ptx  K 4.1/lactate 1.1  sats 96% on 40%  Neuro exam without any gross focal deficits suggestive of acute CVA (no pronator drift, finger to nose equal bilaterally, no facial asymmetry, strength 5/5 bilateral UE/LE, sensation grossly intact  12 EKG without acute ST changes with comparison to prior  tele rhythm strips reviewed, no evidence of acute arrhythmia, pt was noted to have come off monitor during middle of event.  Pt does not complain of CP, SOB, abdominal pain, nausea, back pain, dizziness at this time.     Imp:  likely acute hypercarbia on chronic respiratory failure causing very brief AMS    Plan:  1. would consider CTH without contrast given, ? LOC, although no focal findings at this time to r/o acute intracranial event  2. cont current medical therapy as per Cardio recs  3. cont NIPPV   4. titrating FiO2 to keep sats 90-94%, would avoid hyperoxia  5. check am labs    Discussed with senior medical resident yellow

## 2020-08-10 NOTE — PATIENT PROFILE ADULT - REASON FOR REFUSAL (REFER PATIENT TO HEALTHCARE PROVIDER FOR FOLLOW-UP):
Arrhythmia Clinic Note (Established patient)    DOS: 8/10/2020    Chief complaint/Reason for consult: Afib    Interval History: Last saw Dr. Mayberry 2 years ago.  He has paroxysmal atrial fibrillation and history of hypertension, hyperlipidemia, coronary disease, myocardial infarction, with borderline depressed LV systolic function.  He takes Xarelto 10 mg daily at home.  He has been on amiodarone chronically for the past 2 or 3 years, his dose has been fluctuating.  He has had some chronic shortness of breath and fatigue which have been worsening.  He had a recent ZIO patch done which showed 19% atrial fibrillation burden.  This also showed resting bradycardia in the 40s.  He is initially referred back to electrophysiology clinic to discuss possible pacemaker implantation.  He denies palpitations and largely cannot tell when he is in or out of atrial fibrillation, but does endorse shortness of breath with exertion and chronic fatigue.    ROS (+ highlighted in bold):  Constitutional: Fevers/chills/fatigue/weightloss  HEENT: Blurry vision/eye pain/sore throat/hearing loss  Respiratory: Shortness of breath/cough  Cardiovascular: Chest pain/palpitations/edema/orthopnea/syncope  GI: Nausea/vomitting/diarrhea  MSK: Arthralgias/myagias/muscle weakness  Skin: Rash/sores  Neurological: Numbness/tremors/vertigo  Endocrine: Excessive thirst/polyuria/cold intolerance/heat intolerance  Psych: Depression/anxiety    Past Medical History:   Diagnosis Date   • Arrhythmia 09/02/2017    Atrial Fib   • Blood clotting disorder (HCC) 09/2017    heart   • CAD (coronary artery disease) 4/16/2012   • Dental disorder     upper and lower dentures, full   • Hemorrhagic disorder (HCC)     on Plavix and Xarelto   • High cholesterol    • HTN (hypertension) 4/16/2012   • Hyperglycemia 4/16/2012   • Hyperlipemia 4/16/2012   • Hypertension    • Liver enzyme elevation     history of   • Myocardial infarct (HCC) 09/02/2017    Dr. Mcallister, cardiologist    • Obesity    • Sleep apnea     BIPAP   • Tachycardia 2012   • Tonsillitis        Past Surgical History:   Procedure Laterality Date   • TRIGGER FINGER RELEASE Left 2018    Procedure: TRIGGER FINGER RELEASE - RING;  Surgeon: Jose Miguel Kay M.D.;  Location: SURGERY [facility];  Service: Orthopedics   • MASS EXCISION ORTHO Left 2018    Procedure: MASS EXCISION ORTHO - DISTAL INTERPHALANGEAL JOINT NODULE EXCISION;  Surgeon: Jose Miguel Kay M.D.;  Location: SURGERY [facility];  Service: Orthopedics   • CARDIAC CATH, LEFT HEART  2017    successful thrombectomy  and CHELLE to ostial proximal RCA    • STENT PLACEMENT  2017    cardiac   • CARPAL TUNNEL RELEASE Right    • TRIGGER FINGER RELEASE Right        Social History     Socioeconomic History   • Marital status:      Spouse name: Not on file   • Number of children: Not on file   • Years of education: Not on file   • Highest education level: Not on file   Occupational History   • Not on file   Social Needs   • Financial resource strain: Not on file   • Food insecurity     Worry: Not on file     Inability: Not on file   • Transportation needs     Medical: Not on file     Non-medical: Not on file   Tobacco Use   • Smoking status: Former Smoker     Packs/day: 3.00     Years: 16.00     Pack years: 48.00     Types: Cigarettes     Quit date: 1980     Years since quittin.6   • Smokeless tobacco: Never Used   Substance and Sexual Activity   • Alcohol use: Yes     Alcohol/week: 0.0 oz     Comment: 3 per week   • Drug use: No   • Sexual activity: Yes     Partners: Female   Lifestyle   • Physical activity     Days per week: Not on file     Minutes per session: Not on file   • Stress: Not on file   Relationships   • Social connections     Talks on phone: Not on file     Gets together: Not on file     Attends Latter day service: Not on file     Active member of club or organization: Not on file     Attends meetings of  "clubs or organizations: Not on file     Relationship status: Not on file   • Intimate partner violence     Fear of current or ex partner: Not on file     Emotionally abused: Not on file     Physically abused: Not on file     Forced sexual activity: Not on file   Other Topics Concern   • Not on file   Social History Narrative   • Not on file       Family History   Problem Relation Age of Onset   • Diabetes Mother    • Heart Disease Mother    • Kidney Disease Mother    • Diabetes Sister        Allergies   Allergen Reactions   • Penicillins Swelling   • Sulfa Drugs Swelling   • Brilinta [Ticagrelor] Rash and Itching     .   • Lipidil [Fenofibrate]      Myalgia       Current Outpatient Medications   Medication Sig Dispense Refill   • losartan (COZAAR) 50 MG Tab Take 1 tablet by mouth once daily 100 Tab 0   • amiodarone (CORDARONE) 200 MG Tab Take 1 Tab by mouth every day. 30 Tab 6   • rivaroxaban (XARELTO) 10 MG Tab tablet Take 1 Tab by mouth with dinner. 90 Tab 1   • atorvastatin (LIPITOR) 20 MG Tab Take 1 Tab by mouth every day. 100 Tab 3   • vitamin D (CHOLECALCIFEROL) 1000 UNIT TABS Take 1,000 Units by mouth every day.       No current facility-administered medications for this visit.        Physical Exam:  Vitals:    08/10/20 1116   BP: 130/70   BP Location: Left arm   Patient Position: Sitting   BP Cuff Size: Large adult   Pulse: 63   SpO2: 95%   Weight: 96.6 kg (213 lb)   Height: 1.702 m (5' 7\")     General appearance: NAD, conversant   Eyes: anicteric sclerae, moist conjunctivae; no lid-lag; PERRLA  HENT: Atraumatic; oropharynx clear with moist mucous membranes and no mucosal ulcerations; normal hard and soft palate  Neck: Trachea midline; FROM, supple, no thyromegaly or lymphadenopathy  Lungs: CTA, with normal respiratory effort and no intercostal retractions  CV: RRR, no MRGs, no JVD  Abdomen: Soft, non-tender; no masses or HSM  Extremities: No peripheral edema or extremity lymphadenopathy  Skin: Normal " temperature, turgor and texture; no rash, ulcers or subcutaneous nodules  Psych: Appropriate affect, alert and oriented to person, place and time    Data:  Lipids:   Lab Results   Component Value Date/Time    CHOLSTRLTOT 119 01/16/2020 07:32 AM    TRIGLYCERIDE 81 01/16/2020 07:32 AM    HDL 52 01/16/2020 07:32 AM    LDL 51 01/16/2020 07:32 AM        BMP:  Lab Results   Component Value Date/Time    SODIUM 141 01/16/2020 0732    POTASSIUM 3.5 (L) 01/16/2020 0732    CHLORIDE 107 01/16/2020 0732    CO2 25 01/16/2020 0732    GLUCOSE 102 (H) 01/16/2020 0732    BUN 16 01/16/2020 0732    CREATININE 1.08 01/16/2020 0732    CALCIUM 9.7 01/16/2020 0732    ANION 9.0 01/16/2020 0732        TSH:   Lab Results   Component Value Date/Time    TSHULTRASEN 1.520 01/16/2020 0732        THYROXINE (T4):   No results found for: AMINA     CBC:   Lab Results   Component Value Date/Time    WBC 5.8 01/16/2020 07:31 AM    RBC 5.44 01/16/2020 07:31 AM    HEMOGLOBIN 17.2 01/16/2020 07:31 AM    HEMATOCRIT 51.0 01/16/2020 07:31 AM    MCV 93.8 01/16/2020 07:31 AM    MCH 31.6 01/16/2020 07:31 AM    MCHC 33.7 01/16/2020 07:31 AM    RDW 45.2 01/16/2020 07:31 AM    PLATELETCT 197 01/16/2020 07:31 AM    MPV 10.6 01/16/2020 07:31 AM    NEUTSPOLYS 46.20 01/16/2020 07:31 AM    LYMPHOCYTES 39.30 01/16/2020 07:31 AM    MONOCYTES 10.60 01/16/2020 07:31 AM    EOSINOPHILS 2.60 01/16/2020 07:31 AM    BASOPHILS 1.00 01/16/2020 07:31 AM    IMMGRAN 0.30 01/16/2020 07:31 AM    NRBC 0.00 01/16/2020 07:31 AM    NEUTS 2.65 01/16/2020 07:31 AM    LYMPHS 2.26 01/16/2020 07:31 AM    MONOS 0.61 01/16/2020 07:31 AM    EOS 0.15 01/16/2020 07:31 AM    BASO 0.06 01/16/2020 07:31 AM    IMMGRANAB 0.02 01/16/2020 07:31 AM    NRBCAB 0.00 01/16/2020 07:31 AM        CBC w/o DIFF  Lab Results   Component Value Date/Time    WBC 5.8 01/16/2020 07:31 AM    RBC 5.44 01/16/2020 07:31 AM    HEMOGLOBIN 17.2 01/16/2020 07:31 AM    MCV 93.8 01/16/2020 07:31 AM    MCH 31.6 01/16/2020 07:31 AM     [facility]    RDW 45.2 01/16/2020 07:31 AM    MPV 10.6 01/16/2020 07:31 AM       Prior echo/stress reviewed: Borderline depressed LV systolic function    EKG interpreted by me: Sinus rhythm    ZIO Patch reviewed: 19% atrial fibrillation burden, bradycardia, intermittent atrial fibrillation with RVR    Impression/Plan:  1. PAF (paroxysmal atrial fibrillation) ([facility])  EKG       1.  Paroxysmal atrial fibrillation  2.  Tachycardia-bradycardia syndrome  3.  Chronic amiodarone use, high risk medication  4.  Coronary artery disease status post MI    -We discussed treatment options for his atrial fibrillation including rate control, rhythm control.  He does not have palpitations but he does have some symptoms of shortness of breath and fatigue, and given his borderline reduced LV systolic function, there could be survival benefit to long-term rhythm control, especially with ablation.  -We did discuss that I would recommend he not pursue long-term amiodarone use due to side effects, he is not a candidate for a 1C agent due to history of myocardial infarction, and due to resting bradycardia he would likely need pacemaker implantation to tolerate sotalol.  -After discussion of options, he would rather avoid pacemaker implantation now if possible, so we discussed atrial fibrillation ablation.  -We discussed pulmonary vein isolation for therapeutic management and continued rhythm control.  We discussed the risks and benefits of this procedure.  Risks include 1-3% risk of major cardiovascular event including stroke, myocardial infarction, phrenic nerve damage, esophageal injury and/or fistula formation, cardiac perforation, pericardial effusion, tamponade, major bleeding, or death.  I quoted a 70 to 80% chance free of atrial fibrillation at 12 months.  We discussed that he may also need a second procedure.  -All questions were answered, patient understands risks and benefits, he wants to think about it at  home but he is leaning towards scheduling atrial fibrillation ablation.    -Continue Xarelto, he is on low-dose for now, we discussed that we may do to change this to full dose Eliquis or full dose Xarelto directly following his ablation to minimize his risk of stroke, at least for a couple months.    He will call to schedule, most likely atrial fibrillation ablation, plan to discontinue amiodarone following ablation, and probably repeat ambulatory monitoring a few months after this to reassess for sinus node dysfunction off amiodarone.    Follow-up 4 months    Niko Voss MD  Cardiac Electrophysiology     Patient will follow up with primary MD

## 2020-09-06 LAB
BLOOD GAS COMMENTS ARTERIAL: SIGNIFICANT CHANGE UP
SAO2 % BLDA: 100 % — HIGH (ref 95–99)

## 2020-09-11 NOTE — PATIENT PROFILE ADULT - HARM RISK FACTORS
RX PROGRESS NOTE: Vancomycin Therapeutic Drug Monitoring      Indication for therapy: Sepsis    ALLERGIES:   Allergen Reactions   • Aspirin SHORTNESS OF BREATH     triggers asthma   • Horse Or Horse-Derived Products   (Animal Or Med) Other (See Comments), HIVES and SHORTNESS OF BREATH     Horse serum   • Iodine RASH and Other (See Comments)     Providone-iodine, betadine--pt reports blistering to iodine   • Meropenem RASH     Had cefazolin several times with no adverse reactions noted.   • Metal Topical   (Environmental) RASH     Blisters and skin breakdown, for metal staples   • Pineapple   (Food Or Med) SWELLING   • Tobramycin Other (See Comments)     Acute Renal Failure   • Unknown Other (See Comments) and SHORTNESS OF BREATH     ALLERGY is Inhaler propellant also causes bronchospasm   • Adhesive   (Environmental) RASH   • Allergy Other (See Comments)     Allergy is paper tape, rxn is rash   • Amoxicillin VOMITING   • Benzoin RASH   • Betadine [Povidone Iodine] RASH   • Carbocaine Other (See Comments)     hypotension   • Compazine RASH   • Dilaudid [Hydromorphone Hcl] VOMITING, NAUSEA and DIARRHEA   • Fentanyl RASH     Bronchospasm.    • Hydrocodone HIVES   • Latex Dermatitis   • Mastisol Adhesive RASH   • Mercaptomerin Other (See Comments)     hepatotoxicity   • Nsaids Other (See Comments)     Triggers asthma.    • Piroxicam HIVES   • Sulfasalazine RASH   • Thimerosal SWELLING and Other (See Comments)     Hypotension   • Tramadol HIVES and PRURITUS   • Versed Other (See Comments)     combative   • Mepivacaine Other (See Comments)     abstracted   • Wound Dressing Adhesive Other (See Comments)     severe blistering of skin       Most recent height and weight information:  Weight: 118.4 kg (09/11/20 1215)  Height: 5' 6\" (167.6 cm) (09/11/20 1215)    The Following are the calculated  Current Weights for [place]            Adjusted Ideal    82.9 kg 59.3 kg             Labs:  Serum Creatinine and Creatinine  Clearance:  Serum creatinine: 1.21 mg/dL (H) 09/11/20 1242  Estimated creatinine clearance: 64.7 mL/min (A)    Maximum Temperature (last 24 hours)     Value Max    Temp  (!) 100.8 °F (38.2 °C)        WBC (K/mcL)   Date/Time Value   09/11/2020 1242 14.9 (H)   09/10/2020 1128 14.8 (H)     Microbiology Results     None            Assessment/Plan:  Briefly, this is a 61 year old female started on vancomycin for Sepsis, with a target serum trough concentration of 10-15 mcg/mL.  Patient received a dose of vancomycin 2000 mg at 1645.  Initial dosing regimen will be 2000 mg loading dose once, followed by a maintenance dose of 1250 mg every 12 hours..    Pharmacy will monitor levels as appropriate.    Pharmacy will continue to monitor patient (renal function, microbiology data, risk factors for adverse events, appropriate duration of therapy), will order/monitor serum levels as appropriate, and will adjust dose if/when necessary.      Thank you,    Peterson Lubin RPH  9/11/2020 6:26 PM     yes

## 2021-01-30 NOTE — PROCEDURE NOTE - NSINFORMCONSENT_GEN_A_CORE
Benefits, risks, and possible complications of procedure explained to patient/caregiver who verbalized understanding and gave verbal consent.
Benefits, risks, and possible complications of procedure explained to patient/caregiver who verbalized understanding and gave verbal consent.
no

## 2021-02-24 NOTE — CONSULT NOTE ADULT - CONSULT REASON
Bilateral pleural effusions
CHF
Goals of Care
Hypercapnia
KEITH Hyperkalemia
hematuria
respiratory failure
vent fail
UTI
gi bleed
hypoxemia
Dermal Autograft Text: The defect edges were debeveled with a #15 scalpel blade.  Given the location of the defect, shape of the defect and the proximity to free margins a dermal autograft was deemed most appropriate.  Using a sterile surgical marker, the primary defect shape was transferred to the donor site. The area thus outlined was incised deep to adipose tissue with a #15 scalpel blade.  The harvested graft was then trimmed of adipose and epidermal tissue until only dermis was left.  The skin graft was then placed in the primary defect and oriented appropriately.

## 2021-04-26 NOTE — PATIENT PROFILE ADULT - BRADEN FRICTION AND SHEAR
The patient with a history of severe alcohol-related hepatitis and was started already on IV Solu-Medrol by the ICU team and subsequently by the hospitalist team.  The patient is responding nicely to the steroids and his bilirubin is gradually trending down.  He has no signs of any hepatic encephalopathy at this time.  He is eating well.  At this time I will recommend that he has already taken 10 days of steroids and he should be on oral prednisolone 40 mg once daily.  We should complete his therapy in the next 18 days and then gradually taper off the steroids.  He has no signs of any hepatitis B or C.  Alcohol rehab, multivitamin thiamine and folate.  Alcohol cessation counseling performed.  GI will follow up. Check INR daily.
(2) potential problem

## 2021-07-14 NOTE — DISCHARGE NOTE PROVIDER - INSTRUCTIONS
July 14, 2021         Patient: Maximino Walters   YOB: 1988   Date of Visit: 7/14/2021           To Whom it May Concern:    Maximino Walters was seen in my clinic on 7/14/2021 for an illness that began 7/12/2021.   He may return to work on 7/15/2021.    If you have any questions or concerns, please don't hesitate to call.        Sincerely,           Jelly Cardenas P.A.-C.  Electronically Signed      diabetic heart healthy diet

## 2021-11-24 NOTE — ED STATDOCS - CPE ED CARDIAC NORM
Marilyn is a post-kidney transplant patient who discharged 11/22/21.    Patient chooses to receive medications from  specialty pharmacy.     Jane Benavidez [facility]  [phone]     normal...

## 2022-08-03 NOTE — PHYSICAL THERAPY INITIAL EVALUATION ADULT - LIVES WITH, PROFILE
Jeremiah Christie  62 y.o. female  1957  [address] [facility]  [id number]     [address] [facility] PRACTICE       Encounter Date: 8/3/2022           Established Patient Visit Note: Dena Pritchett MD    Reason for Appointment:  Chief Complaint   Patient presents with    Complete Physical         History of Present Illness:  History provided by patient    Jeremiah Christie is a 62 y.o. female who presents to clinic today for:       Annual Physical    Diet: she reports that she tries to eat healthy, but she will eat sweets when feeling stressed  Exerise: she has not been exercising as much d/t being busy with work    [employer], Ocular Migraine: She reports that she has not had any further symptoms  Allergies, Chronic Cough: followed by allergy. Continues Alvesco; She is not taking any allergy medicine at this time. She reports that when she stops the Alvesco, the cough gets worse. She denies any acid reflux symptoms. She had CXR on 2/7/22 that was normal.   HTN: continues atenolol-chlorthalidone 50-25mg. Home BPs ranging from 99 to 537 systolic. She denies any headache, vision changes, or dizziness. Prediabets: last A1c was 5.8 on 2/7/22  Vitamin D Deficiency: taking Vit D 5000 daily  Thumb Pain: she reports that it is about the same. She has not followed up with the orthopedist recently  HLD: denies any muscle aches with Zocor     HM  She has had J&J COVID vaccine with 1 moderna booster  She reports having mammogram in July at [address] with GYN (Dr. Tiffanie Galvan)  Skin Checks: followed by dermatology.  She reports last visit in March, 2022      Review of Systems  All other ROS were reviewed and are negative except as discussed in HPI        Allergies: Pcn [penicillins]    Medications:     Current Outpatient Medications:     atenoloL-chlorthalidone (TENORETIC) 50-25 mg per tablet, Take 0.5 Tablets by mouth in the morning., Disp: 45 Tablet, Rfl: 1    simvastatin (ZOCOR) 40 mg tablet, Take 1 Tablet by mouth nightly., Disp: 90 Tablet, Rfl: 1    potassium chloride (K-DUR, KLOR-CON M20) 20 mEq tablet, Take 2 Tablets by mouth in the morning., Disp: 180 Tablet, Rfl: 1    cholecalciferol (VITAMIN D3) (5000 Units/125 mcg) tab tablet, Take 5,000 Units by mouth daily. , Disp: , Rfl:     Alvesco 160 mcg/actuation HFAA, INHALE TWO PUFFS BY MOUTH TWICE DAILY, Disp: , Rfl:     tretinoin (RETIN-A) 0.025 % topical cream, APPLY CREAM TOPICALLY TO AFFECTED AREA ONCE DAILY AT BEDTIME TO FACE, Disp: , Rfl:     Flaxseed Oil oil, by Does Not Apply route., Disp: , Rfl:     cranberry extract 500 mg cap capsule, Take 500 mg by mouth., Disp: , Rfl:     B.animalis,bifid,infantis,long 10-15 mg TbEC, Take  by mouth., Disp: , Rfl:     History  Patient Care Team:  Carmen Santana MD as PCP - General (Family Medicine)  Carmen Santana MD as PCP - [facility] Empaneled Provider  Robert Hansen DO as Physician (Obstetrics & Gynecology)  Kristin Aj MD as Physician (Dermatology Physician)    Past Medical History: she has a past medical history of BMI 39.0-39.9,adult (1/20/2020), Essential hypertension (1/20/2020), Hypercholesteremia (1/20/2020), Severe obesity ([address] (1/20/2020), and Vitamin D deficiency (1/20/2020). Past Surgical History: she has a past surgical history that includes hx hysterectomy; hx shoulder arthroscopy (Right); and hx knee arthroscopy (Left). Family Medical History: family history includes Cancer in her father and mother. Social History: she reports that she has never smoked. She has never used smokeless tobacco. She reports that she does not currently use alcohol. She reports that she does not use drugs.         Objective:   Visit Vitals  /73 (BP 1 Location: Right arm, BP Patient Position: Sitting, BP Cuff Size: Adult)   Pulse 64   Temp 98 °F (36.7 °C) (Temporal)   Resp 14   Ht 5' 1\" (1.549 m)   Wt 195 lb (88.5 kg)   SpO2 98%   BMI 36.84 kg/m²     Wt Readings from Last 3 Encounters:   08/03/22 195 lb (88.5 kg)   02/07/22 195 lb (88.5 kg)   08/04/21 193 lb (87.5 kg)       Physical Exam  Vitals reviewed. Constitutional:       General: She is not in acute distress. Appearance: Normal appearance. She is normal weight. She is not ill-appearing or toxic-appearing. HENT:      Head: Normocephalic and atraumatic. Right Ear: Hearing, tympanic membrane, ear canal and external ear normal. No drainage. No middle ear effusion. There is no impacted cerumen. Tympanic membrane is not injected or erythematous. Left Ear: Hearing, tympanic membrane, ear canal and external ear normal. No drainage. No middle ear effusion. There is no impacted cerumen. Tympanic membrane is not injected or erythematous. Nose: Nose normal. No nasal deformity or septal deviation. Mouth/Throat:      Lips: Pink. No lesions. Mouth: Mucous membranes are moist.      Palate: No mass. Pharynx: Oropharynx is clear. Uvula midline. No pharyngeal swelling, oropharyngeal exudate or posterior oropharyngeal erythema. Tonsils: No tonsillar exudate. Eyes:      General: Lids are normal. Vision grossly intact. Gaze aligned appropriately. Right eye: No discharge. Left eye: No discharge. Extraocular Movements: Extraocular movements intact. Conjunctiva/sclera: Conjunctivae normal.      Right eye: Right conjunctiva is not injected. Left eye: Left conjunctiva is not injected. Pupils: Pupils are equal, round, and reactive to light. Neck:      Vascular: No carotid bruit. Cardiovascular:      Rate and Rhythm: Normal rate and regular rhythm. Pulses: Normal pulses. Heart sounds: Normal heart sounds. No murmur heard. No friction rub. No gallop. Pulmonary:      Effort: Pulmonary effort is normal. No respiratory distress. Breath sounds: Normal breath sounds. No stridor. No wheezing, rhonchi or rales.    Abdominal:      General: Bowel sounds are normal. There is no distension or abdominal bruit. Tenderness: There is no abdominal tenderness. There is no guarding. Musculoskeletal:         General: Normal range of motion. Cervical back: Normal range of motion and neck supple. No rigidity. No muscular tenderness. Lymphadenopathy:      Cervical: No cervical adenopathy. Skin:     General: Skin is warm. Coloration: Skin is not jaundiced. Neurological:      General: No focal deficit present. Mental Status: She is alert. Motor: Motor function is intact. Coordination: Coordination is intact. Gait: Gait is intact. Deep Tendon Reflexes:      Reflex Scores:       Patellar reflexes are 2+ on the right side and 2+ on the left side. Psychiatric:         Attention and Perception: Attention and perception normal.         Mood and Affect: Mood and affect normal.         Speech: Speech normal.         Cognition and Memory: Cognition and memory normal.       Assessment & Plan:      ICD-10-CM ICD-9-CM    1. Annual physical exam  Z00.00 V70.0       2. Chronic cough  R05.3 786.2 CT CHEST W WO CONT      3. Prediabetes  R73.03 790.29 HEMOGLOBIN A1C WITH EAG      HEMOGLOBIN A1C WITH EAG      4. Essential hypertension  I10 401.9 CBC W/O DIFF      METABOLIC PANEL, COMPREHENSIVE      URINALYSIS W/ RFLX MICROSCOPIC      TSH 3RD GENERATION      atenoloL-chlorthalidone (TENORETIC) 50-25 mg per tablet      TSH 3RD GENERATION      URINALYSIS W/ RFLX MICROSCOPIC      METABOLIC PANEL, COMPREHENSIVE      CBC W/O DIFF      5. Hypercholesteremia  E78.00 272.0 LIPID PANEL      simvastatin (ZOCOR) 40 mg tablet      LIPID PANEL      6. Vitamin D deficiency  E55.9 268.9 VITAMIN D, 25 HYDROXY      VITAMIN D, 25 HYDROXY        Annual Physical Exam: Reviewed and addressed patient's medical history and concerns as discussed in note. Reviewed recommended screenings and immunizations. Discussed recommendations for diet, exercise, and lifestyle.   Chronic, Cough: Chronic, uncontrolled. Evaluate further as above   All other conditions listed above: Chronic, stable and/or managed by specialist. Will continue current treatment regimen. Will check labs as reflected above. Discussed following up with specialist as scheduled. Discussed recommendations for diet and exercise. I have discussed the diagnosis with the patient and the intended plan as seen in the above orders. The patient has received an after-visit summary along with patient information handout. I have discussed medication side effects and warnings with the patient as well. Disposition  Follow-up and Dispositions    Return in about 6 months (around 2/3/2023) for follow up of chronic conditions.            Betsy Araya MD spouse/children/other relative/sisters

## 2022-08-11 NOTE — PROGRESS NOTE ADULT - PROBLEM/PLAN-7
DISPLAY PLAN FREE TEXT Quality 154 Part A: Falls: Risk Assessment (Should Be Reported With Measure 155.): Falls risk assessment completed and documented in the past 12 months. Quality 111:Pneumonia Vaccination Status For Older Adults: Pneumococcal Vaccination not Administered or Previously Received, Reason not Otherwise Specified Detail Level: Generalized Quality 47: Advance Care Plan: Advance Care Planning discussed and documented in the medical record; patient did not wish or was not able to name a surrogate decision maker or provide an advance care plan. Quality 110: Preventive Care And Screening: Influenza Immunization: Influenza immunization was not ordered or administered, reason not given Quality 226: Preventive Care And Screening: Tobacco Use: Screening And Cessation Intervention: Patient screened for tobacco use, is a smoker AND did not received Cessation Counseling for Unknown Reasons Quality 431: Preventive Care And Screening: Unhealthy Alcohol Use - Screening: Patient screened for unhealthy alcohol use using a single question and scores less than 2 times per year Quality 155 (Denominator): Falls Plan Of Care: Plan of Care not Documented, Reason not Otherwise Specified Quality 154 Part B: Falls: Risk Screening (Should Be Reported With Measure 155.): Patient screened for future fall risk; documentation of no falls in the past year or only one fall without injury in the past year

## 2022-12-19 NOTE — PATIENT PROFILE ADULT - LIVING ENVIRONMENT
Anesthesia Evaluation     Patient summary reviewed and Nursing notes reviewed   no history of anesthetic complications:  NPO Solid Status: > 8 hours  NPO Liquid Status: > 2 hours           Airway   Mallampati: II  TM distance: >3 FB  Neck ROM: full  Dental      Pulmonary    (+) asthma,  Cardiovascular     (+) hypertension, CAD, hyperlipidemia,  carotid artery disease      Neuro/Psych  (+) TIA,    GI/Hepatic/Renal/Endo    (+) obesity,  GERD,  renal disease CRI,     Musculoskeletal     Abdominal    Substance History      OB/GYN          Other                        Anesthesia Plan    ASA 3     MAC     intravenous induction     Anesthetic plan, risks, benefits, and alternatives have been provided, discussed and informed consent has been obtained with: patient.        CODE STATUS:        no

## 2023-02-13 NOTE — ED ADULT NURSE NOTE - NSHOSCREENINGQ1_ED_ALL_ED
HPI: Rafaela is a little 32 month old girl with no PMH presenting as a transfer from [place] for seizure clusters.  Yesterday, the mother notes that the patient was a bit fussy and around 8:30pm, she had a seizure at which point she was taken over to [facility].  At that time, she was noted to be hyponatremic but was sent home.  Today, after breakfast she was noted to be tired and then progressed to having a generalized tonic clonic seizure (mother has a video at bedside) which lasted 30-40 seconds.  She was then subsequently taken again to [place] and was slowly coming back to baseline where she had another generalized tonic seizure.  At that point, she was loaded with 50 mg/kg (500 mg) of Keppra.  Prior to getting picked up by the transport team over at [place], she had another generalized tonic seizure.  Mother denies fevers but states that she did have diarrhea and vomiting which started on Friday.  She has not been eating and drinking her usual amount and has not been producing her usual amount of wet diapers.  Patient is fully vaccinated.    Birth history- born full term with no complications. Shortly went home after birth.    Early Developmental Milestones:  She speaks "a lot" of words  Can run, walk, and ride a tricycle    REVIEW OF SYSTEMS:  Constitutional - no irritability, no fever, no recent weight loss, no poor weight gain  Eyes - no conjunctivitis, no blurry vision, no double vision  Ears/Nose/Mouth/Throat - no ear pain, no rhinorrhea, no congestion, no sore throat  Neck - no pain or stiffness  Respiratory - no tachypnea, no increased work of breathing, no cough  Cardiovascular - no chest pain, no palpitations, no cyanosis, no syncope  Gastrointestinal - no abdominal pain, no nausea, no vomiting, no diarrhea  Genitourinary - no change in urination, no hematuria  Integumentary - no rash, no jaundice, no pallor, no color change  Musculoskeletal - no joint swelling, no joint stiffness, no back pain, no extremity pain  Endocrine - no heat or cold intolerance, no jitteriness, no failure to thrive  Hematologic- no easy bruising, no bleeding  Neurological - see HPI  Psychiatric: No depression, anxiety, mood swings or difficulty sleeping  All Other Systems - reviewed, negative    PAST MEDICAL & SURGICAL HISTORY:      MEDICATIONS  (STANDING):    MEDICATIONS  (PRN):    Allergies    No Known Allergies    Intolerances        FAMILY HISTORY:    No family history of migraines, seizures, or developmental delay.       Vital Signs Last 24 Hrs  T(C): 37 (13 Feb 2023 16:47), Max: 37 (13 Feb 2023 16:47)  T(F): 98.6 (13 Feb 2023 16:47), Max: 98.6 (13 Feb 2023 16:47)  HR: 103 (13 Feb 2023 16:47) (103 - 103)  BP: 116/75 (13 Feb 2023 16:47) (116/75 - 116/75)  BP(mean): --  RR: 22 (13 Feb 2023 16:47) (22 - 22)  SpO2: 92% (13 Feb 2023 16:47) (92% - 92%)    Parameters below as of 13 Feb 2023 16:47  Patient On (Oxygen Delivery Method): room air      Daily     Daily       GENERAL PHYSICAL EXAM  General:        sleeping  HEENT:         Normocephalic, atraumatic, clear conjunctiva, external ear normal, nasal mucosa normal; dry, cracked lips  Neck:            Supple, full range of motion, no nuchal rigidity  CV:               Regular rate and rhythm, no murmurs. Warm and well perfused.  Respiratory:   Clear to auscultation; Even, nonlabored breathing  Abdominal:    Soft, nontender, nondistended, no masses, no organomegaly  Extremities:    No joint swelling, erythema, tenderness; normal ROM, no contractures  Skin:              No rash, no neurocutaneous stigmata     NEUROLOGIC EXAM  Mental Status:     sleeping but arouses and is quite fussy upon arousal  Cranial Nerves:    PERRL, EOMI, no facial asymmetry with symmetric grimace  Muscle Strength:  unable to fully assess  Muscle Tone:       Normal tone  DTR:                    2+/4 Biceps, Brachioradialis, Triceps Bilateral;  2+/4  Patellar, Ankle on the left arm and bilateral legs; unable to assess right arm due to IV No clonus.  Babinski:              Plantar reflexes flexion bilaterally No

## 2023-06-12 NOTE — ED PROVIDER NOTE - ATTESTATION, MLM
I called the patient's wifeand discussed the test results of the echocardiogram and nuclear stress test , Sahar,   The patient was also on the line  Nuclear stress test showed medium sized infarction of the basal to mid inferior wall  The echo also showed basal inferior hypokinesis  The patient has been feeling well with no episodes of chest pain  He does not have any exertional shortness of breath or chest pain  He is still smoking  He is taking his blood pressure medicines, aspirin 81 mg daily and Crestor  I told the patient and his wife that these findings likely represents coronary artery disease and a blocked artery in his heart  His LVEF is normal   Given absence of symptoms currently and a relatively small area we will treat medically for coronary disease  He is already taking his aspirin and statin  LDL less than 70  The patient is still smoking  I expressed the importance of quitting smoking with the patient  Patient we will continue to try and has follow-up with me in August to discuss things further 
I have reviewed and confirmed nurses' notes for patient's medications, allergies, medical history, and surgical history.

## 2023-07-21 NOTE — PATIENT PROFILE ADULT - DO YOU FEEL LIKE HURTING OTHERS?
[no

## 2023-09-07 NOTE — PATIENT PROFILE ADULT - NSPROGENANESREACTION_GEN_A_NUR
Population Health Chart Review & Patient Outreach Details:     Reason for Outreach Encounter:     [x]  Non-Compliant Report   []  Payor Report ([employer], PHN, BCBS, MSSP, MCIP, C, etc.)   []  Pre-Visit Chart Review     Updates Requested / Reviewed:     [x]  Care Everywhere    [x]     [x]  External Sources ([facility], [facility], DIS, etc.)   [x]  Care Team Updated    Patient Outreach Method:    []  Telephone Outreach Completed   [] Successful   [] Left Voicemail   [] Unable to Contact (wrong number, no voicemail)  [x]  [email or web address] Portal Outreach Sent  []  Letter Outreach Mailed  [x]  Fax Sent for External Records  [x]  External Records Upload    Health Maintenance Topics Addressed and Outreach Outcomes / Actions Taken:        [x]      Breast Cancer Screening []  Mammo Scheduled      []  External Records Requested     []  Added Reminder to Complete to Upcoming Primary Care Appt Notes     []  Patient Declined     []  Patient Will Call Back to Schedule     []  Patient Will Schedule with External Provider / Order Routed if Applicable             [x]       Cervical Cancer Screening []  Pap Scheduled      []  External Records Requested     []  Added Reminder to Complete to Upcoming Primary Care Appt Notes     []  Patient Declined     []  Patient Will Call Back to Schedule     []  Patient Will Schedule with External Provider               [x]          Colorectal Cancer Screening []  Colonoscopy Case Request or Referral Placed     [x]  External Records Requested     []  Added Reminder to Complete to Upcoming Primary Care Appt Notes     []  Patient Declined     []  Patient Will Call Back to Schedule     []  Patient Will Schedule with External Provider     []  Fit Kit Mailed (add the SmartPhrase under additional notes)     []  Reminded Patient to Complete Home Test             []      Diabetic Eye Exam []  Eye Camera Scheduled or Optometry Referral Placed     []  External Records Requested     []  Added Reminder to  Complete to Upcoming Primary Care Appt Notes     []  Patient Declined     []  Patient Will Call Back to Schedule     []  Patient Will Schedule with External Provider             []      Blood Pressure Control []  Primary Care Follow Up Visit Scheduled     []  Remote Blood Pressure Reading Captured     []  Added Reminder to Complete to Upcoming Primary Care Appt Notes     []  Patient Declined     []  Patient Will Call Back / Patient Will Send Portal Message with Reading     []  Patient Will Call Back to Schedule Provider Visit             [x]       HbA1c & Other Labs []  Lab Appt Scheduled for Due Labs     []  Primary Care Follow Up Visit Scheduled      []  Reminded Patient to Complete Home Test     []  Added Reminder to Complete to Upcoming Primary Care Appt Notes     []  Patient Declined     []  Patient Will Call Back to Schedule     []  Patient Will Schedule with External Provider / Order Routed if Applicable           []    Schedule Primary Care Appt []  Primary Care Appt Scheduled     []  Patient Declined     []  Patient Will Call Back to Schedule     []  Pt Established with External Provider & Updated Care Team             []      Medication Adherence []  Primary Care Appointment Scheduled     []  Added Reminder to Upcoming Primary Care Appt Notes     []  Patient Reminded to  Prescription     []  Patient Declined, Provider Notified if Needed     []  Sent Provider Message to Review and/or Add Exclusion to Problem List             []      Osteoporosis Screening []  DXA Appointment Scheduled     []  External Records Requested     []  Added Reminder to Complete to Upcoming Primary Care Appt Notes     []  Patient Declined     []  Patient Will Call Back to Schedule     []  Patient Will Schedule with External Provider / Order Routed if Applicable     Additional Care Coordinator Notes:         Further Action Needed If Patient Returns Outreach:            no previous reaction

## 2023-09-09 NOTE — ED ADULT TRIAGE NOTE - WEIGHT IN KG
Pt presents w/ c/o LLE swelling x3 weeks, pt reports she was started on two different antibiotics and finished both however redness swelling is not improving. Seen at immediate care center today and sent here for further eval.    97.5

## 2023-10-02 NOTE — ED PROVIDER NOTE - CLINICAL SUMMARY MEDICAL DECISION MAKING FREE TEXT BOX
Patient
64 year old woman with bloody stools grossly positive on exam abdomen soft and blood pressure stable.  Patient noted to be hyperkalemic with no EKG changes medication given.  Patient with acute changes in BUN: creatinine ratio IVF given but in small amounts due to patient's CHF renal paged.  Patient admitted.

## 2024-03-14 NOTE — H&P ADULT - REASON FOR ADMISSION
Return if symptoms change or worsen.     Thank you for the opportunity to care for you during this emergency department visit. I hope you are doing better. We strive to always improve our care. You may receive a survey and I hope you had a positive experience here. We greatly appreciate your 5 scores.     Acute hypoxemic respiratory failure, GIB

## 2024-05-08 NOTE — H&P ADULT - PROBLEM SELECTOR PROBLEM 6
Medication: Gabapentin     Dose/Frequency: 100 mg cap, 2 caps 3x/day    Quantity: 210    Pharmacy: [facility]    Office:   [x] PCP/Provider -   [] Speciality/Provider -     Does the patient have enough for 3 days?   [x] Yes   [] No - Send as HP to POD     Medication: trazodone    Dose/Frequency: 150 mg tab, 1 tab daily     Quantity: 30    Pharmacy: [facility]     Office:   [x] PCP/Provider -   [] Speciality/Provider -     Does the patient have enough for 3 days?   [x] Yes   [] No - Send as HP to POD      Hypertension

## 2024-05-21 NOTE — PROGRESS NOTE ADULT - GASTROINTESTINAL
Prior authorization started via [facility]    Routed to central pa team    negative detailed exam

## 2024-05-21 NOTE — BRIEF OPERATIVE NOTE - PRIMARY SURGEON
Dr. Jose Honeycutt DVT: lovenox   Diet: regular  Dispo: pending med optimization, PT rec outpatient pt

## 2024-06-05 NOTE — H&P ADULT - PROBLEM/PLAN-2
DISPLAY PLAN FREE TEXT #Nausea   Likely from uremia, cannot r/o infectious etiology which may explain her abdominal pain and low grade rectal temp  - PRN zofran

## 2024-06-17 NOTE — PROGRESS NOTE ADULT - PROBLEM SELECTOR PLAN 2
Patient must contact office for refills.  
resolved
resolved
Cas expressing that she may want to reconsider taking her mother in for debridement of the wound
- hemoglobin stable. Hx of portal hypertensive gastropathy  few months ago on EGD

## 2024-08-28 NOTE — DIETITIAN INITIAL EVALUATION ADULT. - % CHANGE
John    Thank you for choosing [facility].  We know you have options when it comes to your healthcare; we appreciate that you chose us. Our goal is to provide exceptional  service and world class care to every patient.  You will be receiving a survey via email or text message asking for your feedback.  Please take a few minutes to share your thoughts about your recent visit. Your comments help us understand what we do well and ways we can improve.  Thank you in advance for your valuable feedback.      Dr. CHARLY Williamson    25.21

## 2024-09-10 NOTE — PROCEDURE NOTE - NSPROCNAME_GEN_A_CORE
Debridement Subjective   Patient ID: Joe Taylor is a 71 y.o. female.    Patient comes in today with a few aches and pains.  She has pain in the right lower sacral area that radiates to the groin.  She also has pain around the right greater trochanter which she says goes down to the foot.  There is burning and numbness going down to the foot.  She thinks that may be also coming from the sacral area.  She had some issues with shoes but has put orthotics in.  Her heels ache and will get tingly in the wrong shoes.  No trauma.  No loss of bowel or bladder.    Back Pain  This is a recurrent problem. The current episode started in the past 7 days. The problem occurs constantly. The problem has been waxing and waning since onset. The pain is present in the gluteal. The quality of the pain is described as burning and stabbing. The pain radiates to the right foot. The pain is at a severity of 9/10. The pain is The same all the time. The symptoms are aggravated by bending and twisting. Stiffness is present All day. Associated symptoms include leg pain, numbness, pelvic pain, tingling and weakness. Pertinent negatives include no abdominal pain, bladder incontinence, bowel incontinence, chest pain, dysuria, fever, headaches, paresis, paresthesias, perianal numbness or weight loss. Risk factors include history of cancer, menopause and obesity.       Review of Systems   Constitutional:  Negative for fatigue, fever, unexpected weight change and weight loss.   HENT:  Negative for congestion, ear pain, hearing loss, sore throat and trouble swallowing.    Eyes:  Negative for pain and visual disturbance.   Respiratory:  Negative for cough and shortness of breath.    Cardiovascular:  Negative for chest pain, palpitations and leg swelling.   Gastrointestinal:  Negative for abdominal pain, blood in stool, bowel incontinence, diarrhea, nausea and vomiting.   Genitourinary:  Positive for pelvic pain. Negative for bladder incontinence,  dysuria, frequency, hematuria and urgency.   Musculoskeletal:  Positive for back pain. Negative for joint swelling.   Skin:  Negative for pallor and rash.   Neurological:  Positive for tingling, weakness and numbness. Negative for dizziness, syncope, headaches and paresthesias.   Psychiatric/Behavioral:  Negative for confusion, decreased concentration, hallucinations and suicidal ideas.      Vitals:    09/09/24 1449   BP: 120/70   Pulse: 93   Temp: 36.4 °C (97.5 °F)   SpO2: 96%      Objective   Physical Exam  Constitutional:       Appearance: Normal appearance. She is obese.   Musculoskeletal:      Comments: There is tenderness over the right lower sacral area, there is tenderness superior to the right greater trochanter and somewhat over the greater trochanter.  There is no weakness of the extremity.  I am not eliciting a positive straight leg test though there is a little bit of pain in the heel with dorsiflexion of the foot.  DTRs normal.     Neurological:      Mental Status: She is alert.         Assessment/Plan   Diagnoses and all orders for this visit:  Muscle spasm  -     tiZANidine (Zanaflex) 4 mg tablet; Take 1 tablet (4 mg) by mouth every 6 hours if needed for muscle spasms.

## 2024-09-17 NOTE — ED ADULT TRIAGE NOTE - HEART RATE (BEATS/MIN)
[address] [facility]  Internal Medicine  [address] [place] [address]  Phone: ([phone]  Fax: ()-  Follow Up Time: 1 week    [facility]  Nephrology  [address] [place] [address]  Phone: [phone]  Fax: [phone]  Follow Up Time: 1 week

## 2024-10-29 NOTE — PROGRESS NOTE ADULT - ASSESSMENT
62 y/o female with CAD s/p CABG and PCI, PAD s/p balloon angioplasty, diabetes mellitus, hypertension, hyperlipidemia admitted with complaints of worsening exertional shortness of breath. She was evaluated by cardiology and started on IV diuretics, for acute on chronic diastolic CHF. Stress test was done, showed inferior ischemia, medical management advised. She was noted to have CO2 narcosis, placed on NIV. Cardiology planned for right heart catheterisation given RV dysfunction. Dobutamine was given for RV dysfunction. She was noted to have contrast induced nephropathy, diuretics were held. Nephrology consulted, Losartan was held. IR was consulted for pleural effusion, underwent right thoracentesis. RRT was called, for lethargy, related to hypercapnia, improved with NIV. Diuretics were reinstated given worsening symptoms. Her renal function improved. Venofer was added for anemia. PT consulted, advised SINGH. Given improvement in respiratory status, she was started on oral diuretics. Her CXR showed opacification of right lung. Metolazone was added by nephrology for fluid overload. IR consulted for right thoracentesis, she underwent thoracentesis on 12/12, 1500 cc of fluid was removed. Her renal function normalized. Opt out

## 2025-01-27 NOTE — PATIENT PROFILE ADULT - LIVES WITH
Quality 47: Advance Care Plan: Advance Care Planning discussed and documented in the medical record; patient did not wish or was not able to name a surrogate decision maker or provide an advance care plan. Quality 226: Preventive Care And Screening: Tobacco Use: Screening And Cessation Intervention: Patient screened for tobacco use and is an ex/non-smoker Detail Level: Detailed adult child(maye)/sibling(s)/spouse

## 2025-03-13 NOTE — DISCHARGE NOTE PROVIDER - NSDCADMDATE_GEN_ALL_CORE_FT
06-Aug-2019 15:16
[Time Spent: ___ minutes] : I have spent [unfilled] minutes of time on the encounter which excludes teaching and separately reported services.

## 2025-03-19 NOTE — PHYSICAL THERAPY INITIAL EVALUATION ADULT - PHYSICAL ASSIST/NONPHYSICAL ASSIST: SUPINE/SIT, REHAB EVAL
Per the order of Dr. Foy, patient has been scheduled for laparoscopic robotic assisted cholecystectomy, possible open on 03.21.2025.  Patient provided with instruction sheet and post operative appointment.  Patient instructed to please contact our office with any questions.    Procedure scheduled through [facility].  Dr. Foy to enter orders.    
assisted with trunk with HOB elevated/1 person assist

## 2025-04-02 NOTE — PHYSICAL THERAPY INITIAL EVALUATION ADULT - FOLLOWS COMMANDS/ANSWERS QUESTIONS, REHAB EVAL
100% of the time
able to follow single-step instructions/100% of the time
Detail Level: Generalized
Products Recommended: Elta MD apply to face daily
General Sunscreen Counseling: I recommended a broad spectrum sunscreen with a SPF of 30 or higher.  I explained that SPF 30 sunscreens block approximately 97 percent of the sun's harmful rays.  Sunscreens should be applied at least 15 minutes prior to expected sun exposure and then every 2 hours after that as long as sun exposure continues. If swimming or exercising sunscreen should be reapplied every 45 minutes to an hour after getting wet or sweating.  One ounce, or th equivalent of a shot glass full of sunscreen, is adequate to protect the skin not covered by a bathing suit. I also recommended a lip balm with a sunscreen as well. Sun protective clothing can be used in lieu of sunscreen but must be worn the entire time you are exposed to the sun's rays.

## 2025-04-21 NOTE — ED STATDOCS - NS_EDPROVIDERDISPOUSERTYPE_ED_A_ED
MEDICATION PENDED  Rx Refill Request Telephone Encounter     Name:  Shaun Garrison  : 1958      Medication Name:  METFORMIN  Dose (Optional):    500 MG  Quantity (Optional):    180  Directions (Optional):   1 TABLET BY MOUTH TWICE A DAY     ALLERGIES:   ON FILE     Specific Pharmacy location:  [facility]     Date of last appointment:  24  Date of next appointment:  NONE     Best number to reach patient:  [phone]      LAST PRESCRIPTION FROM  2025 WAS PRINTED - THEY NEVER RECEIVED. PLEASE SEND TO [facility]              Scribe Attestation (For Scribes USE Only)... Scribe Attestation (For Scribes USE Only).../Attending Attestation (For Attendings USE Only)...